# Patient Record
Sex: FEMALE | Race: WHITE | NOT HISPANIC OR LATINO | Employment: OTHER | URBAN - METROPOLITAN AREA
[De-identification: names, ages, dates, MRNs, and addresses within clinical notes are randomized per-mention and may not be internally consistent; named-entity substitution may affect disease eponyms.]

---

## 2017-01-04 ENCOUNTER — COMMUNICATION - HEALTHEAST (OUTPATIENT)
Dept: VASCULAR SURGERY | Facility: CLINIC | Age: 57
End: 2017-01-04

## 2017-01-04 ENCOUNTER — OFFICE VISIT - HEALTHEAST (OUTPATIENT)
Dept: PHYSICAL THERAPY | Facility: REHABILITATION | Age: 57
End: 2017-01-04

## 2017-01-04 ENCOUNTER — RECORDS - HEALTHEAST (OUTPATIENT)
Dept: ADMINISTRATIVE | Facility: OTHER | Age: 57
End: 2017-01-04

## 2017-01-04 ENCOUNTER — OFFICE VISIT - HEALTHEAST (OUTPATIENT)
Dept: VASCULAR SURGERY | Facility: CLINIC | Age: 57
End: 2017-01-04

## 2017-01-04 DIAGNOSIS — I89.0 ACQUIRED LYMPHEDEMA OF LEG: ICD-10-CM

## 2017-01-04 DIAGNOSIS — E66.01 MORBID OBESITY WITH BMI OF 45.0-49.9, ADULT (H): ICD-10-CM

## 2017-01-04 DIAGNOSIS — I87.332 VENOUS HYPERTENSION, CHRONIC, WITH ULCER AND INFLAMMATION, LEFT (H): ICD-10-CM

## 2017-01-04 DIAGNOSIS — I87.321 VENOUS HYPERTENSION, CHRONIC, WITH INFLAMMATION, RIGHT: ICD-10-CM

## 2017-01-06 ENCOUNTER — OFFICE VISIT - HEALTHEAST (OUTPATIENT)
Dept: PHYSICAL THERAPY | Facility: REHABILITATION | Age: 57
End: 2017-01-06

## 2017-01-06 DIAGNOSIS — E66.01 MORBID OBESITY WITH BMI OF 45.0-49.9, ADULT (H): ICD-10-CM

## 2017-01-06 DIAGNOSIS — I87.321 VENOUS HYPERTENSION, CHRONIC, WITH INFLAMMATION, RIGHT: ICD-10-CM

## 2017-01-06 DIAGNOSIS — I89.0 ACQUIRED LYMPHEDEMA OF LEG: ICD-10-CM

## 2017-01-09 ENCOUNTER — OFFICE VISIT - HEALTHEAST (OUTPATIENT)
Dept: PHYSICAL THERAPY | Facility: REHABILITATION | Age: 57
End: 2017-01-09

## 2017-01-09 DIAGNOSIS — I89.0 ACQUIRED LYMPHEDEMA OF LEG: ICD-10-CM

## 2017-01-09 DIAGNOSIS — E66.01 MORBID OBESITY WITH BMI OF 45.0-49.9, ADULT (H): ICD-10-CM

## 2017-01-09 DIAGNOSIS — I87.321 VENOUS HYPERTENSION, CHRONIC, WITH INFLAMMATION, RIGHT: ICD-10-CM

## 2017-01-11 ENCOUNTER — OFFICE VISIT - HEALTHEAST (OUTPATIENT)
Dept: PHYSICAL THERAPY | Facility: REHABILITATION | Age: 57
End: 2017-01-11

## 2017-01-11 DIAGNOSIS — E66.01 MORBID OBESITY WITH BMI OF 45.0-49.9, ADULT (H): ICD-10-CM

## 2017-01-11 DIAGNOSIS — I87.321 VENOUS HYPERTENSION, CHRONIC, WITH INFLAMMATION, RIGHT: ICD-10-CM

## 2017-01-11 DIAGNOSIS — I89.0 ACQUIRED LYMPHEDEMA OF LEG: ICD-10-CM

## 2017-01-13 ENCOUNTER — OFFICE VISIT - HEALTHEAST (OUTPATIENT)
Dept: SURGERY | Facility: CLINIC | Age: 57
End: 2017-01-13

## 2017-01-13 ENCOUNTER — OFFICE VISIT - HEALTHEAST (OUTPATIENT)
Dept: PHYSICAL THERAPY | Facility: REHABILITATION | Age: 57
End: 2017-01-13

## 2017-01-13 DIAGNOSIS — I89.0 LYMPHEDEMA: ICD-10-CM

## 2017-01-13 DIAGNOSIS — E03.9 HYPOTHYROID: ICD-10-CM

## 2017-01-13 DIAGNOSIS — E66.01 MORBID OBESITY WITH BMI OF 45.0-49.9, ADULT (H): ICD-10-CM

## 2017-01-13 DIAGNOSIS — E66.01 OBESITY, CLASS III, BMI 40-49.9 (MORBID OBESITY) (H): ICD-10-CM

## 2017-01-13 DIAGNOSIS — I87.321 VENOUS HYPERTENSION, CHRONIC, WITH INFLAMMATION, RIGHT: ICD-10-CM

## 2017-01-13 DIAGNOSIS — I89.0 ACQUIRED LYMPHEDEMA OF LEG: ICD-10-CM

## 2017-01-13 ASSESSMENT — MIFFLIN-ST. JEOR: SCORE: 1653.27

## 2017-01-16 ENCOUNTER — OFFICE VISIT - HEALTHEAST (OUTPATIENT)
Dept: PHYSICAL THERAPY | Facility: REHABILITATION | Age: 57
End: 2017-01-16

## 2017-01-16 ENCOUNTER — OFFICE VISIT - HEALTHEAST (OUTPATIENT)
Dept: VASCULAR SURGERY | Facility: CLINIC | Age: 57
End: 2017-01-16

## 2017-01-16 ENCOUNTER — RECORDS - HEALTHEAST (OUTPATIENT)
Dept: ADMINISTRATIVE | Facility: OTHER | Age: 57
End: 2017-01-16

## 2017-01-16 DIAGNOSIS — I89.0 ACQUIRED LYMPHEDEMA OF LEG: ICD-10-CM

## 2017-01-16 DIAGNOSIS — E66.01 MORBID OBESITY WITH BMI OF 45.0-49.9, ADULT (H): ICD-10-CM

## 2017-01-16 DIAGNOSIS — I87.332 VENOUS HYPERTENSION, CHRONIC, WITH ULCER AND INFLAMMATION, LEFT (H): ICD-10-CM

## 2017-01-16 DIAGNOSIS — I87.321 VENOUS HYPERTENSION, CHRONIC, WITH INFLAMMATION, RIGHT: ICD-10-CM

## 2017-01-18 ENCOUNTER — OFFICE VISIT - HEALTHEAST (OUTPATIENT)
Dept: PHYSICAL THERAPY | Facility: REHABILITATION | Age: 57
End: 2017-01-18

## 2017-01-18 ENCOUNTER — AMBULATORY - HEALTHEAST (OUTPATIENT)
Dept: VASCULAR SURGERY | Facility: CLINIC | Age: 57
End: 2017-01-18

## 2017-01-18 DIAGNOSIS — E66.01 MORBID OBESITY WITH BMI OF 45.0-49.9, ADULT (H): ICD-10-CM

## 2017-01-18 DIAGNOSIS — I89.0 ACQUIRED LYMPHEDEMA OF LEG: ICD-10-CM

## 2017-01-18 DIAGNOSIS — I87.321 VENOUS HYPERTENSION, CHRONIC, WITH INFLAMMATION, RIGHT: ICD-10-CM

## 2017-01-20 ENCOUNTER — OFFICE VISIT - HEALTHEAST (OUTPATIENT)
Dept: PHYSICAL THERAPY | Facility: REHABILITATION | Age: 57
End: 2017-01-20

## 2017-01-20 DIAGNOSIS — E66.01 MORBID OBESITY WITH BMI OF 45.0-49.9, ADULT (H): ICD-10-CM

## 2017-01-20 DIAGNOSIS — I87.321 VENOUS HYPERTENSION, CHRONIC, WITH INFLAMMATION, RIGHT: ICD-10-CM

## 2017-01-20 DIAGNOSIS — I89.0 ACQUIRED LYMPHEDEMA OF LEG: ICD-10-CM

## 2017-01-23 ENCOUNTER — COMMUNICATION - HEALTHEAST (OUTPATIENT)
Dept: SURGERY | Facility: CLINIC | Age: 57
End: 2017-01-23

## 2017-01-25 ENCOUNTER — OFFICE VISIT - HEALTHEAST (OUTPATIENT)
Dept: PHYSICAL THERAPY | Facility: REHABILITATION | Age: 57
End: 2017-01-25

## 2017-01-25 DIAGNOSIS — E66.01 MORBID OBESITY WITH BMI OF 45.0-49.9, ADULT (H): ICD-10-CM

## 2017-01-25 DIAGNOSIS — I87.321 VENOUS HYPERTENSION, CHRONIC, WITH INFLAMMATION, RIGHT: ICD-10-CM

## 2017-01-25 DIAGNOSIS — I89.0 ACQUIRED LYMPHEDEMA OF LEG: ICD-10-CM

## 2017-02-15 ENCOUNTER — OFFICE VISIT - HEALTHEAST (OUTPATIENT)
Dept: VASCULAR SURGERY | Facility: CLINIC | Age: 57
End: 2017-02-15

## 2017-02-15 ENCOUNTER — AMBULATORY - HEALTHEAST (OUTPATIENT)
Dept: LAB | Facility: HOSPITAL | Age: 57
End: 2017-02-15

## 2017-02-15 DIAGNOSIS — I87.321 VENOUS HYPERTENSION, CHRONIC, WITH INFLAMMATION, RIGHT: ICD-10-CM

## 2017-02-15 DIAGNOSIS — E66.01 MORBID OBESITY WITH BMI OF 45.0-49.9, ADULT (H): ICD-10-CM

## 2017-02-15 DIAGNOSIS — E55.9 VITAMIN D DEFICIENCY: ICD-10-CM

## 2017-02-15 DIAGNOSIS — E66.01 OBESITY, CLASS III, BMI 40-49.9 (MORBID OBESITY) (H): ICD-10-CM

## 2017-02-15 DIAGNOSIS — I87.332 VENOUS HYPERTENSION, CHRONIC, WITH ULCER AND INFLAMMATION, LEFT (H): ICD-10-CM

## 2017-02-15 DIAGNOSIS — I89.0 ACQUIRED LYMPHEDEMA OF LEG: ICD-10-CM

## 2017-02-16 LAB — HBA1C MFR BLD: 4.2 % (ref 4.2–6.1)

## 2017-02-17 ENCOUNTER — COMMUNICATION - HEALTHEAST (OUTPATIENT)
Dept: VASCULAR SURGERY | Facility: CLINIC | Age: 57
End: 2017-02-17

## 2017-02-17 ENCOUNTER — COMMUNICATION - HEALTHEAST (OUTPATIENT)
Dept: SURGERY | Facility: CLINIC | Age: 57
End: 2017-02-17

## 2017-02-17 ENCOUNTER — OFFICE VISIT - HEALTHEAST (OUTPATIENT)
Dept: SURGERY | Facility: CLINIC | Age: 57
End: 2017-02-17

## 2017-02-17 DIAGNOSIS — E66.01 MORBID OBESITY WITH BMI OF 40.0-44.9, ADULT (H): ICD-10-CM

## 2017-02-17 DIAGNOSIS — E55.9 VITAMIN D DEFICIENCY: ICD-10-CM

## 2017-02-17 ASSESSMENT — MIFFLIN-ST. JEOR: SCORE: 1585.23

## 2017-02-18 ENCOUNTER — COMMUNICATION - HEALTHEAST (OUTPATIENT)
Dept: SURGERY | Facility: CLINIC | Age: 57
End: 2017-02-18

## 2017-02-27 ENCOUNTER — OFFICE VISIT - HEALTHEAST (OUTPATIENT)
Dept: SURGERY | Facility: CLINIC | Age: 57
End: 2017-02-27

## 2017-02-27 DIAGNOSIS — E66.01 OBESITY, MORBID, BMI 40.0-49.9 (H): ICD-10-CM

## 2017-02-27 DIAGNOSIS — Z71.3 DIETARY COUNSELING: ICD-10-CM

## 2017-02-27 ASSESSMENT — MIFFLIN-ST. JEOR: SCORE: 1543.5

## 2017-04-24 ENCOUNTER — OFFICE VISIT - HEALTHEAST (OUTPATIENT)
Dept: VASCULAR SURGERY | Facility: CLINIC | Age: 57
End: 2017-04-24

## 2017-04-24 DIAGNOSIS — I87.321 VENOUS HYPERTENSION, CHRONIC, WITH INFLAMMATION, RIGHT: ICD-10-CM

## 2017-04-24 DIAGNOSIS — I89.0 ACQUIRED LYMPHEDEMA OF LEG: ICD-10-CM

## 2017-04-24 DIAGNOSIS — G62.9 PERIPHERAL NEUROPATHY: ICD-10-CM

## 2017-04-24 DIAGNOSIS — E66.01 MORBID OBESITY WITH BMI OF 40.0-44.9, ADULT (H): ICD-10-CM

## 2017-04-24 DIAGNOSIS — I87.332 VENOUS HYPERTENSION, CHRONIC, WITH ULCER AND INFLAMMATION, LEFT (H): ICD-10-CM

## 2017-06-07 ENCOUNTER — OFFICE VISIT - HEALTHEAST (OUTPATIENT)
Dept: SURGERY | Facility: CLINIC | Age: 57
End: 2017-06-07

## 2017-06-07 DIAGNOSIS — D69.6 THROMBOCYTOPENIA (H): ICD-10-CM

## 2017-06-07 DIAGNOSIS — E60 LOW ZINC LEVEL: ICD-10-CM

## 2017-06-07 DIAGNOSIS — R79.89 ABNORMAL LFTS: ICD-10-CM

## 2017-06-07 DIAGNOSIS — E66.01 OBESITY, CLASS III, BMI 40-49.9 (MORBID OBESITY) (H): ICD-10-CM

## 2017-06-07 ASSESSMENT — MIFFLIN-ST. JEOR: SCORE: 1594.19

## 2017-06-23 ENCOUNTER — AMBULATORY - HEALTHEAST (OUTPATIENT)
Dept: LAB | Facility: HOSPITAL | Age: 57
End: 2017-06-23

## 2017-06-23 DIAGNOSIS — E66.01 OBESITY, CLASS III, BMI 40-49.9 (MORBID OBESITY) (H): ICD-10-CM

## 2017-06-23 DIAGNOSIS — E60 LOW ZINC LEVEL: ICD-10-CM

## 2017-06-23 DIAGNOSIS — D69.6 THROMBOCYTOPENIA (H): ICD-10-CM

## 2017-06-23 DIAGNOSIS — R79.89 ABNORMAL LFTS: ICD-10-CM

## 2017-06-26 ENCOUNTER — AMBULATORY - HEALTHEAST (OUTPATIENT)
Dept: SURGERY | Facility: CLINIC | Age: 57
End: 2017-06-26

## 2017-06-26 DIAGNOSIS — E60 LOW ZINC LEVEL: ICD-10-CM

## 2017-06-29 ENCOUNTER — COMMUNICATION - HEALTHEAST (OUTPATIENT)
Dept: SURGERY | Facility: CLINIC | Age: 57
End: 2017-06-29

## 2017-08-02 ENCOUNTER — COMMUNICATION - HEALTHEAST (OUTPATIENT)
Dept: SURGERY | Facility: CLINIC | Age: 57
End: 2017-08-02

## 2017-08-02 ENCOUNTER — AMBULATORY - HEALTHEAST (OUTPATIENT)
Dept: SURGERY | Facility: CLINIC | Age: 57
End: 2017-08-02

## 2017-08-02 DIAGNOSIS — E60 LOW ZINC LEVEL: ICD-10-CM

## 2017-08-02 DIAGNOSIS — R79.89 LOW VITAMIN D LEVEL: ICD-10-CM

## 2017-08-04 ENCOUNTER — OFFICE VISIT - HEALTHEAST (OUTPATIENT)
Dept: SURGERY | Facility: CLINIC | Age: 57
End: 2017-08-04

## 2017-08-04 DIAGNOSIS — Z71.3 DIETARY COUNSELING: ICD-10-CM

## 2017-08-04 DIAGNOSIS — E66.9 OBESITY (BMI 30-39.9): ICD-10-CM

## 2017-08-04 ASSESSMENT — MIFFLIN-ST. JEOR: SCORE: 1491.68

## 2017-08-09 ENCOUNTER — RECORDS - HEALTHEAST (OUTPATIENT)
Dept: LAB | Facility: CLINIC | Age: 57
End: 2017-08-09

## 2017-08-09 LAB — AFP SERPL-MCNC: 8.8 UG/ML

## 2017-08-10 ENCOUNTER — TRANSFERRED RECORDS (OUTPATIENT)
Dept: HEALTH INFORMATION MANAGEMENT | Facility: CLINIC | Age: 57
End: 2017-08-10

## 2017-08-10 LAB
HBV SURFACE AG SERPL QL IA: NEGATIVE
HCV AB SERPL QL IA: NEGATIVE
HEPATITIS B SURFACE ANTIBODY LHE- HISTORICAL: NEGATIVE

## 2017-08-23 ENCOUNTER — OFFICE VISIT - HEALTHEAST (OUTPATIENT)
Dept: SURGERY | Facility: CLINIC | Age: 57
End: 2017-08-23

## 2017-08-23 DIAGNOSIS — R79.89 HIGH SERUM FERRITIN: ICD-10-CM

## 2017-08-23 ASSESSMENT — MIFFLIN-ST. JEOR: SCORE: 1493.95

## 2017-08-25 ENCOUNTER — TRANSFERRED RECORDS (OUTPATIENT)
Dept: HEALTH INFORMATION MANAGEMENT | Facility: CLINIC | Age: 57
End: 2017-08-25

## 2017-09-19 ENCOUNTER — INFUSION - HEALTHEAST (OUTPATIENT)
Dept: INFUSION THERAPY | Facility: HOSPITAL | Age: 57
End: 2017-09-19

## 2017-09-19 DIAGNOSIS — E83.19 IRON OVERLOAD: ICD-10-CM

## 2017-09-19 DIAGNOSIS — E83.119 HEMOCHROMATOSIS: ICD-10-CM

## 2017-09-19 ASSESSMENT — MIFFLIN-ST. JEOR: SCORE: 1475.8

## 2017-09-26 ENCOUNTER — INFUSION - HEALTHEAST (OUTPATIENT)
Dept: INFUSION THERAPY | Facility: HOSPITAL | Age: 57
End: 2017-09-26

## 2017-09-26 DIAGNOSIS — E83.119 HEMOCHROMATOSIS: ICD-10-CM

## 2017-09-26 DIAGNOSIS — E83.19 IRON OVERLOAD: ICD-10-CM

## 2017-09-28 ENCOUNTER — RECORDS - HEALTHEAST (OUTPATIENT)
Dept: ADMINISTRATIVE | Facility: OTHER | Age: 57
End: 2017-09-28

## 2017-10-03 ENCOUNTER — AMBULATORY - HEALTHEAST (OUTPATIENT)
Dept: INFUSION THERAPY | Facility: HOSPITAL | Age: 57
End: 2017-10-03

## 2017-10-04 ENCOUNTER — HOSPITAL ENCOUNTER (OUTPATIENT)
Dept: CARDIOLOGY | Facility: HOSPITAL | Age: 57
Discharge: HOME | End: 2017-10-04
Attending: INTERNAL MEDICINE

## 2017-10-04 DIAGNOSIS — R79.89 ELEVATED FERRITIN: ICD-10-CM

## 2017-10-04 DIAGNOSIS — R01.1 HEART MURMUR: ICD-10-CM

## 2017-10-04 DIAGNOSIS — R17 ELEVATED BILIRUBIN: ICD-10-CM

## 2017-10-04 DIAGNOSIS — R60.9 EDEMA, UNSPECIFIED TYPE: ICD-10-CM

## 2017-10-04 DIAGNOSIS — R74.8 ABNORMAL LIVER ENZYMES: ICD-10-CM

## 2017-10-04 LAB
AORTIC ROOT: 2.9 CM
AORTIC VALVE MEAN VELOCITY: 120 CM/S
AV DIMENSIONLESS INDEX VTI: 0.7
AV MEAN GRADIENT: 7 MMHG
AV PEAK GRADIENT: 14.3 MMHG
AV VALVE AREA: 2.2 CM2
AV VELOCITY RATIO: 0.8
BSA FOR ECHO PROCEDURE: 2.05 M2
CV BLOOD PRESSURE: NORMAL MMHG
CV ECHO HEIGHT: 62 IN
CV ECHO WEIGHT: 212 LBS
DOP CALC AO PEAK VEL: 189 CM/S
DOP CALC AO VTI: 34 CM
DOP CALC LVOT AREA: 3.14 CM2
DOP CALC LVOT DIAMETER: 2 CM
DOP CALC LVOT PEAK VEL: 144 CM/S
DOP CALC LVOT STROKE VOLUME: 75.4 CM3
DOP CALCLVOT PEAK VEL VTI: 24 CM
EJECTION FRACTION: 61 % (ref 55–75)
FRACTIONAL SHORTENING: 29.3 % (ref 28–44)
INTERVENTRICULAR SEPTUM IN END DIASTOLE: 0.94 CM (ref 0.6–0.9)
IVS/PW RATIO: 0.9
LA AREA 1: 16.3 CM2
LA AREA 2: 19 CM2
LEFT ATRIUM LENGTH: 5.2 CM
LEFT ATRIUM SIZE: 4 CM
LEFT ATRIUM TO AORTIC ROOT RATIO: 1.38 NO UNITS
LEFT ATRIUM VOLUME INDEX: 24.7 ML/M2
LEFT ATRIUM VOLUME: 50.6 CM3
LEFT VENTRICLE DIASTOLIC VOLUME INDEX: 45.6 CM3/M2 (ref 34–74)
LEFT VENTRICLE DIASTOLIC VOLUME: 93.4 CM3 (ref 46–106)
LEFT VENTRICLE MASS INDEX: 83.4 G/M2
LEFT VENTRICLE SYSTOLIC VOLUME INDEX: 17.6 CM3/M2 (ref 11–31)
LEFT VENTRICLE SYSTOLIC VOLUME: 36 CM3 (ref 14–42)
LEFT VENTRICULAR INTERNAL DIMENSION IN DIASTOLE: 4.92 CM (ref 3.8–5.2)
LEFT VENTRICULAR INTERNAL DIMENSION IN SYSTOLE: 3.48 CM (ref 2.2–3.5)
LEFT VENTRICULAR MASS: 171 G
LEFT VENTRICULAR OUTFLOW TRACT MEAN GRADIENT: 4 MMHG
LEFT VENTRICULAR OUTFLOW TRACT MEAN VELOCITY: 83.6 CM/S
LEFT VENTRICULAR OUTFLOW TRACT PEAK GRADIENT: 8 MMHG
LEFT VENTRICULAR POSTERIOR WALL IN END DIASTOLE: 1.01 CM (ref 0.6–0.9)
LV STROKE VOLUME INDEX: 36.8 ML/M2
MITRAL VALVE E/A RATIO: 1
MV AVERAGE E/E' RATIO: 8.1 CM/S
MV DECELERATION TIME: 225 MS
MV E'TISSUE VEL-LAT: 11.5 CM/S
MV E'TISSUE VEL-MED: 12.1 CM/S
MV LATERAL E/E' RATIO: 8.3
MV MEDIAL E/E' RATIO: 7.9
MV PEAK A VELOCITY: 91.2 CM/S
MV PEAK E VELOCITY: 95.1 CM/S
NUC REST DIASTOLIC VOLUME INDEX: 3392 LBS
NUC REST SYSTOLIC VOLUME INDEX: 62 IN
TRICUSPID REGURGITATION PEAK PRESSURE GRADIENT: 13 MMHG
TRICUSPID VALVE ANULAR PLANE SYSTOLIC EXCURSION: 3 CM
TRICUSPID VALVE PEAK REGURGITANT VELOCITY: 180 CM/S

## 2017-10-04 ASSESSMENT — MIFFLIN-ST. JEOR: SCORE: 1484.88

## 2017-10-23 ENCOUNTER — COMMUNICATION - HEALTHEAST (OUTPATIENT)
Dept: SURGERY | Facility: CLINIC | Age: 57
End: 2017-10-23

## 2017-10-23 DIAGNOSIS — E66.01 MORBID OBESITY WITH BMI OF 40.0-44.9, ADULT (H): ICD-10-CM

## 2017-12-06 ENCOUNTER — RECORDS - HEALTHEAST (OUTPATIENT)
Dept: ADMINISTRATIVE | Facility: OTHER | Age: 57
End: 2017-12-06

## 2017-12-06 ENCOUNTER — TRANSFERRED RECORDS (OUTPATIENT)
Dept: HEALTH INFORMATION MANAGEMENT | Facility: CLINIC | Age: 57
End: 2017-12-06

## 2017-12-12 ENCOUNTER — HOSPITAL ENCOUNTER (OUTPATIENT)
Dept: MAMMOGRAPHY | Facility: HOSPITAL | Age: 57
Discharge: HOME | End: 2017-12-12
Attending: FAMILY MEDICINE

## 2017-12-12 ENCOUNTER — RECORDS - HEALTHEAST (OUTPATIENT)
Dept: ADMINISTRATIVE | Facility: OTHER | Age: 57
End: 2017-12-12

## 2017-12-12 DIAGNOSIS — N63.10 LUMP OF RIGHT BREAST: ICD-10-CM

## 2017-12-13 ENCOUNTER — RECORDS - HEALTHEAST (OUTPATIENT)
Dept: ADMINISTRATIVE | Facility: OTHER | Age: 57
End: 2017-12-13

## 2017-12-14 ENCOUNTER — OFFICE VISIT - HEALTHEAST (OUTPATIENT)
Dept: SURGERY | Facility: CLINIC | Age: 57
End: 2017-12-14

## 2017-12-14 DIAGNOSIS — K74.60 CIRRHOSIS OF LIVER WITHOUT ASCITES, UNSPECIFIED HEPATIC CIRRHOSIS TYPE (H): ICD-10-CM

## 2017-12-14 DIAGNOSIS — K81.1 CHRONIC CHOLECYSTITIS: ICD-10-CM

## 2017-12-14 ASSESSMENT — MIFFLIN-ST. JEOR: SCORE: 1521.16

## 2018-01-10 ENCOUNTER — TRANSFERRED RECORDS (OUTPATIENT)
Dept: HEALTH INFORMATION MANAGEMENT | Facility: CLINIC | Age: 58
End: 2018-01-10

## 2018-01-31 ENCOUNTER — TRANSFERRED RECORDS (OUTPATIENT)
Dept: HEALTH INFORMATION MANAGEMENT | Facility: CLINIC | Age: 58
End: 2018-01-31

## 2018-02-01 ENCOUNTER — RECORDS - HEALTHEAST (OUTPATIENT)
Dept: ADMINISTRATIVE | Facility: OTHER | Age: 58
End: 2018-02-01

## 2018-02-07 ENCOUNTER — HOSPITAL ENCOUNTER (OUTPATIENT)
Dept: NUCLEAR MEDICINE | Facility: HOSPITAL | Age: 58
Discharge: HOME | End: 2018-02-07
Attending: INTERNAL MEDICINE

## 2018-02-07 ENCOUNTER — TRANSFERRED RECORDS (OUTPATIENT)
Dept: HEALTH INFORMATION MANAGEMENT | Facility: CLINIC | Age: 58
End: 2018-02-07

## 2018-02-07 DIAGNOSIS — K31.89 RETAINED FOOD IN STOMACH: ICD-10-CM

## 2018-02-07 DIAGNOSIS — K74.60 CIRRHOSIS OF LIVER (H): ICD-10-CM

## 2018-02-07 DIAGNOSIS — R11.0 NAUSEA: ICD-10-CM

## 2018-03-12 ENCOUNTER — COMMUNICATION - HEALTHEAST (OUTPATIENT)
Dept: VASCULAR SURGERY | Facility: CLINIC | Age: 58
End: 2018-03-12

## 2018-03-12 ENCOUNTER — TELEPHONE (OUTPATIENT)
Dept: TRANSPLANT | Facility: CLINIC | Age: 58
End: 2018-03-12

## 2018-03-12 NOTE — LETTER
March 12, 2018      Nicci Pemberton  [address] [facility] [address]    Dear Nicci,    Thank you for your interest in the Transplant Center at [facility], [facility]. We look forward to being a part of your care team and assisting you through the transplant process.    As we discussed, your transplant coordinator is Monae Tapia (Liver).  You may call your coordinator at any time with questions or concerns, call [phone] option 4.    Please complete the following.    1. Fill out and return the enclosed forms    Authorization for Electronic Communication    Authorization to Discuss Protected Health Information    [employer] Release of Information    2. Sign up for:    [employer], access to your electronic medical record (see enclosed pamphlet)    [employer], a transplant education website (see enclosed booklet)    You can use these tools to learn more about your transplant, communicate with your care team, and track your medical details    Sincerely,    Solid Organ Transplant  [facility], [facility]    cc: Care Team

## 2018-03-12 NOTE — TELEPHONE ENCOUNTER
Intake Progress Note    Organ:  Liver    Initial Call Made by: referral from [facility]    Referring Physician:  Dr Raymond Leija    PCP- Dr Pedro Carrillo- [facility]    Hematologist- [facility]- Nina    Assigned Coordinator: Monae Tapia    Reported Diagnosis: Cirrhosis likely fatty liver    On Dialysis:   No    Reported Medical History:  Has had elevated liver labs for years.  SInce has been loosing weight and working with [employer]'s her labs have been rising.  Has lost 50 #  Has lymphodema- on diuretics.      Records:  I will request.     Clinic RN Appt (Only Adult Kidney, Liver and Pancreas Referrals): Y or N    Preferred Evaluation Start Date:  ASAP     Online Forms    Best time patient can be reached:    Type of packet sent:  Liver packet    Misc. Notes: May speak with emergency contacts listed in EPIC- - Keny    Verbal Consent: Y     Email Consent: Y or N    If no PCP or referring information the time of call informed pt to call back with information: Y or N    Informed patient to call if doesn't receive packet and or phone call from coordinator within given time Y    [facility][birth year]) policy anne- ID- [id number]  Group- [id number]

## 2018-03-13 ENCOUNTER — TRANSFERRED RECORDS (OUTPATIENT)
Dept: HEALTH INFORMATION MANAGEMENT | Facility: CLINIC | Age: 58
End: 2018-03-13

## 2018-03-14 ENCOUNTER — APPOINTMENT (OUTPATIENT)
Dept: TRANSPLANT | Facility: CLINIC | Age: 58
End: 2018-03-14
Attending: INTERNAL MEDICINE
Payer: COMMERCIAL

## 2018-03-20 NOTE — TELEPHONE ENCOUNTER
Return VM received from pt reporting she is on vacation and can be reached on 3/26/18. Will attempt to reach pt on 3/26/18.

## 2018-03-21 ENCOUNTER — MEDICAL CORRESPONDENCE (OUTPATIENT)
Dept: TRANSPLANT | Facility: CLINIC | Age: 58
End: 2018-03-21

## 2018-03-22 ENCOUNTER — MEDICAL CORRESPONDENCE (OUTPATIENT)
Dept: TRANSPLANT | Facility: CLINIC | Age: 58
End: 2018-03-22

## 2018-03-28 DIAGNOSIS — K74.60 CIRRHOSIS (H): Primary | ICD-10-CM

## 2018-03-28 NOTE — TELEPHONE ENCOUNTER
"56yo referred by Dr. Leija at [facility]. Pt with hx cirrhosis from unknown cause, possibly ANGULO. Pt reports that she has had elevated LFTs for \"many years.\" Per referring notes alpha 1 phenotype MZ, antimitochondrial lanre negative and genetic hemochromatosis normal (see scanned records). Pt also has a hx anemia and was evaluated by heme/onc 12/2017 (see scanned records). Pt acknowledges that she is overweight, but has been working on weight loss and has had success (50lb weight loss over the past year). Pt has not drank alcohol significantly for over 25 years, last drink January 2017 when she had a few sips on New Years. Pt denies drug us and has not smoked since 2011 (prior <1ppd x20 years) Pt also has a hx lymphedema and LE cellulitis. Pt works full time in a hardware store. She lives with her  who is supportive and involved.     MELD: 15 (per referring note)  Imaging: abd US 8/10/17 at [facility] (see scanned records)  Ascites: none  HE: none  GIB: no hx  EGD: 1/13/18 at [facility] (see scanned records)  Colonoscopy: never    Plan:   Consult with Dr. Montes on 4/24/18, consult due to low MELD and compensated liver disease.     Pt verbalized understanding and comfort with above plan. Pt confirmed he  will attend appts.     Message left for referring provider updating him of above plan.   "

## 2018-04-11 ENCOUNTER — TELEPHONE (OUTPATIENT)
Dept: TRANSPLANT | Facility: CLINIC | Age: 58
End: 2018-04-11

## 2018-04-11 NOTE — TELEPHONE ENCOUNTER
April 11, 2018: I called Nicci  (and left a message) to introduce myself and discuss timing of the liver consult that Monae, her coordinator, placed orders for.    We would like for her to attend appointments on Tuesday, April 24.    Alma Rosa Recinos  Pre-Liver Transplant   [phone]    Message  Received: 2 weeks ago       Monae Tapia, RN  Alma Rosa Recinos       Please schedule pt for consult with Dr. Montes on 4/24/18. Ideally should could be scheduled in an early slot to allow adding surgery if appropriate. Let me know if you have any questions.            Orders Only for Transplant Evaluation  3/28/2018       Monae Tapia, RN - [facility] Solid Organ Transplant Encounter Summary       Diagnosis       Cirrhosis (h) (Primary)              Orders Signed This Encounter (10)      CBC with platelets        INR        AFP tumor marker        Hepatic panel        Basic metabolic panel        ABO/Rh type and screen        Pulse oximetry nursing         REFERRAL        NUTRITION REFERRAL        GASTROENTEROLOGY ADULT REF CONSULT ONLY

## 2018-04-11 NOTE — LETTER
April 12, 2018    LIVER CONSULT SCHEDULE    Patient:   Nicci Pemberton  MR#:    [id number]  Coordinator:  Monae Tapia RN  [phone]  :    Alma Rosa BUCKNER   [phone]  Location[facility]    [facility]  Date:    April 24, 2018    This is your consultation schedule, please follow dates and times. You will receive reminder phone calls for other tests, but please follow this schedule only! If you have any questions about dates and times, please call us on the number listed above. Thank you, Transplant Services       Day/Date:  Tuesday, April 24, 2018  Time Location Activity   6:45 am Imaging and Lab testing  (1st floor [facility],  [address] [place] [address]; [facility] [address]) Blood tests   7:30 am Transplant Services  (3rd floor [facility]) Review evaluation process & daily schedule   8:00 am Medicine Specialties  (3rd floor [facility]) Appointment with Dr. Montes,  Hepatologist   9:30 am Transplant Services  ([facility] floor [facility]) Appointment with Tamika Zhang  Registered Dietitian   10:00 am Transplant Services  (3rd floor [facility]) Appointment with Tracy Almeida     11:15 am Transplant Services  ([facility] floor [facility]) Check out with the Nursing Staff       * IF ONLINE CHECK IN IS NOT DONE, YOU WILL NEED TO CHECK IN AT LEAST 15 MINUTES EARLIER THAN THE FIRST SCHEDULED APPOINTMENT *

## 2018-04-12 NOTE — TELEPHONE ENCOUNTER
April 12, 2018: I spoke with Nicci who is more than willing to be seen on April 24. I assured her I would send the schedule today via [employer] and advised her that, if she doesn't receive it by the end of the next week, to give us a call and I can [employer] next-day air it.    Alma Rosa Pennington  Pre-Liver Transplant   [phone]

## 2018-04-13 ENCOUNTER — TRANSFERRED RECORDS (OUTPATIENT)
Dept: HEALTH INFORMATION MANAGEMENT | Facility: CLINIC | Age: 58
End: 2018-04-13

## 2018-04-24 ENCOUNTER — OFFICE VISIT (OUTPATIENT)
Dept: TRANSPLANT | Facility: CLINIC | Age: 58
End: 2018-04-24
Attending: INTERNAL MEDICINE
Payer: COMMERCIAL

## 2018-04-24 ENCOUNTER — OFFICE VISIT (OUTPATIENT)
Dept: GASTROENTEROLOGY | Facility: CLINIC | Age: 58
End: 2018-04-24
Attending: INTERNAL MEDICINE
Payer: COMMERCIAL

## 2018-04-24 VITALS
WEIGHT: 220.5 LBS | TEMPERATURE: 97.7 F | BODY MASS INDEX: 40.58 KG/M2 | HEIGHT: 62 IN | HEART RATE: 84 BPM | OXYGEN SATURATION: 100 % | RESPIRATION RATE: 16 BRPM | DIASTOLIC BLOOD PRESSURE: 76 MMHG | SYSTOLIC BLOOD PRESSURE: 117 MMHG

## 2018-04-24 DIAGNOSIS — K74.60 CIRRHOSIS (H): Primary | ICD-10-CM

## 2018-04-24 DIAGNOSIS — K72.90 DECOMPENSATED HEPATIC CIRRHOSIS (H): Primary | ICD-10-CM

## 2018-04-24 DIAGNOSIS — Z76.82 AWAITING ORGAN TRANSPLANT STATUS: Primary | ICD-10-CM

## 2018-04-24 DIAGNOSIS — K74.60 DECOMPENSATED HEPATIC CIRRHOSIS (H): Primary | ICD-10-CM

## 2018-04-24 DIAGNOSIS — K74.69 OTHER CIRRHOSIS OF LIVER (H): Primary | ICD-10-CM

## 2018-04-24 DIAGNOSIS — K74.60 CIRRHOSIS (H): ICD-10-CM

## 2018-04-24 LAB
ABO + RH BLD: NORMAL
ABO + RH BLD: NORMAL
AFP SERPL-MCNC: 5.9 UG/L (ref 0–8)
ALBUMIN SERPL-MCNC: 2.6 G/DL (ref 3.4–5)
ALP SERPL-CCNC: 150 U/L (ref 40–150)
ALT SERPL W P-5'-P-CCNC: 55 U/L (ref 0–50)
ANION GAP SERPL CALCULATED.3IONS-SCNC: 5 MMOL/L (ref 3–14)
AST SERPL W P-5'-P-CCNC: 81 U/L (ref 0–45)
BILIRUB DIRECT SERPL-MCNC: 1.3 MG/DL (ref 0–0.2)
BILIRUB SERPL-MCNC: 3.3 MG/DL (ref 0.2–1.3)
BLD GP AB SCN SERPL QL: NORMAL
BLOOD BANK CMNT PATIENT-IMP: NORMAL
BUN SERPL-MCNC: 11 MG/DL (ref 7–30)
CALCIUM SERPL-MCNC: 8.9 MG/DL (ref 8.5–10.1)
CHLORIDE SERPL-SCNC: 102 MMOL/L (ref 94–109)
CO2 SERPL-SCNC: 30 MMOL/L (ref 20–32)
CREAT SERPL-MCNC: 0.67 MG/DL (ref 0.52–1.04)
ERYTHROCYTE [DISTWIDTH] IN BLOOD BY AUTOMATED COUNT: 13.8 % (ref 10–15)
GFR SERPL CREATININE-BSD FRML MDRD: >90 ML/MIN/1.7M2
GLUCOSE SERPL-MCNC: 80 MG/DL (ref 70–99)
HCT VFR BLD AUTO: 37.6 % (ref 35–47)
HGB BLD-MCNC: 13.3 G/DL (ref 11.7–15.7)
INR PPP: 1.83 (ref 0.86–1.14)
MCH RBC QN AUTO: 38.8 PG (ref 26.5–33)
MCHC RBC AUTO-ENTMCNC: 35.4 G/DL (ref 31.5–36.5)
MCV RBC AUTO: 110 FL (ref 78–100)
PLATELET # BLD AUTO: 65 10E9/L (ref 150–450)
POTASSIUM SERPL-SCNC: 4.3 MMOL/L (ref 3.4–5.3)
PROT SERPL-MCNC: 6.6 G/DL (ref 6.8–8.8)
RBC # BLD AUTO: 3.43 10E12/L (ref 3.8–5.2)
SODIUM SERPL-SCNC: 137 MMOL/L (ref 133–144)
SPECIMEN EXP DATE BLD: NORMAL
WBC # BLD AUTO: 4.9 10E9/L (ref 4–11)

## 2018-04-24 PROCEDURE — 85027 COMPLETE CBC AUTOMATED: CPT | Performed by: INTERNAL MEDICINE

## 2018-04-24 PROCEDURE — 86900 BLOOD TYPING SEROLOGIC ABO: CPT | Performed by: INTERNAL MEDICINE

## 2018-04-24 PROCEDURE — 86901 BLOOD TYPING SEROLOGIC RH(D): CPT | Performed by: INTERNAL MEDICINE

## 2018-04-24 PROCEDURE — 80048 BASIC METABOLIC PNL TOTAL CA: CPT | Performed by: INTERNAL MEDICINE

## 2018-04-24 PROCEDURE — 86850 RBC ANTIBODY SCREEN: CPT | Performed by: INTERNAL MEDICINE

## 2018-04-24 PROCEDURE — 82105 ALPHA-FETOPROTEIN SERUM: CPT | Performed by: INTERNAL MEDICINE

## 2018-04-24 PROCEDURE — 97802 MEDICAL NUTRITION INDIV IN: CPT | Mod: ZF | Performed by: DIETITIAN, REGISTERED

## 2018-04-24 PROCEDURE — 36415 COLL VENOUS BLD VENIPUNCTURE: CPT | Performed by: INTERNAL MEDICINE

## 2018-04-24 PROCEDURE — 80076 HEPATIC FUNCTION PANEL: CPT | Performed by: INTERNAL MEDICINE

## 2018-04-24 PROCEDURE — 85610 PROTHROMBIN TIME: CPT | Performed by: INTERNAL MEDICINE

## 2018-04-24 RX ORDER — SPIRONOLACTONE 100 MG/1
TABLET, FILM COATED ORAL
Status: ON HOLD | COMMUNITY
Start: 2017-11-15 | End: 2019-08-16

## 2018-04-24 RX ORDER — ERGOCALCIFEROL (VITAMIN D2) 10 MCG
1000 TABLET ORAL
COMMUNITY
End: 2018-08-21 | Stop reason: DRUGHIGH

## 2018-04-24 RX ORDER — LEVOTHYROXINE SODIUM 125 UG/1
TABLET ORAL
Status: ON HOLD | COMMUNITY
Start: 2017-11-15 | End: 2019-08-16

## 2018-04-24 RX ORDER — PHENTERMINE HYDROCHLORIDE 37.5 MG/1
TABLET ORAL
COMMUNITY
Start: 2017-10-25 | End: 2018-08-21

## 2018-04-24 RX ORDER — MAGNESIUM OXIDE 400 MG/1
400 TABLET ORAL DAILY
Status: ON HOLD | COMMUNITY
End: 2019-09-21

## 2018-04-24 NOTE — PROGRESS NOTES
"Outpatient MNT: Liver Consult    Time Spent: 30 minutes  Visit Type: Initial  Referring Physician: Dr Alford   Pt accompanied by: her , Hugo     History of previous txp: none    Nutrition Assessment  Pt cooks at home and adds \"a little salt\" and trying to reduce overall sodium consumption x 1 year. She reports buying canned veggies, sometimes no added salt versions. Pt and  are both interested in eating healthier and losing weight. She reports the cause of her liver disease is a mystery, potentially from iron overload, but she also has fatty liver.     Vitamins, Supplements, Pertinent Meds: vit D, magnesium oxide (for leg cramps)  Herbal Medicines/Supplements: none     Diet Recall  Breakfast Oats, waffles, cereal, sometimes will have toast, breakfast meats, some eggs OR premier protein shake 2-3x/week for the past 6 months   Lunch Salad every other day (with chicken or other protein + cheese) or deli meat s/w (turkey, ham, tuna or PBJ - reports monitoring portion of hull or jam/PB on s/w)   Dinner chix or fish + salad or canned veggie, sometimes potatoes    Snacks Some candy or popcorn, triple zero greek yogurt, some ice cream, fruit    Beverages Water, a few diet soda/week, 4 ounces juice 5x/week, coffee with creamer, occasional Gatorade, 16 oz skim milk    Dining out 1x/week - Mexican foods, cafe (breakfast foods), Chinese     Physical Activity  Pt reports chronic knee pain so both biking and walking are painful for her. She does have a stationary bike at home, which she has ridden for 1 hour, but does report pain with this. Is considering getting a cortisone shot, which has helped her with pain before. If pain subsides, would like to ride her bike nightly.      Anthropometrics  Height:   62 in   BMI:    40.3    Weight Status:Obesity Grade III BMI >40   Weight:  220 lbs            IBW (lb): 110  % IBW: 200    Wt Hx: Pt reports intentional 50 lb weight loss x 1 year but cutting out most snacks, " sweets, and reducing fast food. Current weight fluctuates some, with report of + pedal edema.     Adj/dosing BW: 138 lbs/63 kg       Malnutrition  % Intake: Decreased intake does not meet criteria for malnutrition   % Weight Loss: Weight loss does not meet criteria for malnutrition   Subcutaneous Fat Loss: does not meet criteria   Muscle Loss: Mild  Fluid Accumulation/Edema: Mild   Malnutrition Diagnosis: Non-Severe malnutrition in the context of chronic illness    Estimated Nutrition Needs  Energy  4815-5231     (20-25 kcal/kg dosing BW for desired wt loss)     Protein  63-76    (1-1.2 g/kg for increased needs)           Fluid  1 ml/kcal or per MD        Micronutrient   Na+: <2000 mg/day            Nutrition Diagnosis  Food and nutrition related knowledge deficit r/t no previous diet education r/t liver disease AEB pt report, liver consult for RD visit.     Nutrition Intervention  Nutrition education provided:  Discussed sodium intake (low sodium foods and drinks, seasoning food without salt and tips for low sodium diet). Pt overall doing well with this. Encouraged her to purchase low sodium deli turkey if able.     Reviewed adequate protein intake. Encouraged receiving protein from both animal and plant based sources. Encouraged increased protein intake and by default, hopefully reduced carb/starch consumption to help with weight and liver disease. Encouraged protein shakes more often, eating eggs for BF more often, plain Greek yogurt with frozen berries and chopped nuts, splitting a baked potato with  or eat baked potatoes fewer times during the week, etc. Provided list of protein sources, goal protein range for consumption, and also emailed list of iron content of foods per pt request. Although her liver disease etiology seems unclear, would not over restrict higher iron foods at this time until the cause is known.     Did not review post transplant diet guidelines at this time d/t liver consult only.      Patient Understanding: Pt verbalized understanding of education provided.  Expected Compliance: Good  Follow-Up Plans: PRN     Nutrition Goals  1. Limit Na+ <2000mg/day  2. Ideal minimum protein intake of 63 g/day    Provided pt with contact info.   Genet Zhang RD, LD  [facility] [phone]

## 2018-04-24 NOTE — MR AVS SNAPSHOT
"              After Visit Summary   2018    Nicci Pemberton    MRN: [id number]           Patient Information     Date Of Birth          1960        Visit Information        Provider Department      2018 9:30 AM Criss Zhang RD [facility] Solid Organ Transplant        Today's Diagnoses     Cirrhosis (H)    -  1       Follow-ups after your visit        Who to contact     If you have questions or need follow up information about today's clinic visit or your schedule please contact [facility] SOLID ORGAN TRANSPLANT directly at [phone].  Normal or non-critical lab and imaging results will be communicated to you by [employer], letter or phone within 4 business days after the clinic has received the results. If you do not hear from us within 7 days, please contact the clinic through [employer] or phone. If you have a critical or abnormal lab result, we will notify you by phone as soon as possible.  Submit refill requests through [employer] or call your pharmacy and they will forward the refill request to us. Please allow 3 business days for your refill to be completed.          Additional Information About Your Visit        MyChart Information     [email or web address] lets you send messages to your doctor, view your test results, renew your prescriptions, schedule appointments and more. To sign up, go to [email or web address] . Click on \"Log in\" on the left side of the screen, which will take you to the Welcome page. Then click on \"Sign up Now\" on the right side of the page.     You will be asked to enter the access code listed below, as well as some personal information. Please follow the directions to create your username and password.     Your access code is: [email or web address]  Expires: 2018 11:31 AM     Your access code will  in 90 days. If you need help or a new code, please call your [place] clinic or [phone].        Care EveryWhere ID     This is your Care EveryWhere ID. This could be used by other " organizations to access your [facility] medical records  [id number]         Blood Pressure from Last 3 Encounters:   04/24/18 117/76    Weight from Last 3 Encounters:   04/24/18 100 kg (220 lb 8 oz)              We Performed the Following     MNT INDIVIDUAL INITIAL EA 15 MIN        Primary Care Provider Office Phone # Fax #    Pedro Ricardo [phone]       [place] [address] [address] [facility] [address]        Equal Access to Services     BEATRIZ GONCALVES : Hadii aad ku hadasho Soomaali, waaxda luqadaha, qaybta kaalmada adeegyada, waxay idiin hayaan adeeg kharash la'aan . So [facility] [phone].    ATENCIÓN: Si habla español, tiene a gaston disposición servicios gratuitos de asistencia lingüística. [facility] [phone].    We comply with applicable federal civil rights laws and [place] laws. We do not discriminate on the basis of race, color, national origin, age, disability, sex, sexual orientation, or gender identity.            Thank you!     Thank you for choosing [facility] SOLID ORGAN TRANSPLANT  for your care. Our goal is always to provide you with excellent care. Hearing back from our patients is one way we can continue to improve our services. Please take a few minutes to complete the written survey that you may receive in the mail after your visit with us. Thank you!             Your Updated Medication List - Protect others around you: Learn how to safely use, store and throw away your medicines at www.disposemymeds.org.          This list is accurate as of 4/24/18 12:34 PM.  Always use your most recent med list.                   Brand Name Dispense Instructions for use Diagnosis    levothyroxine 125 MCG tablet    SYNTHROID/LEVOTHROID     TK 1 T PO D        magnesium oxide 400 MG tablet    MAG-OX     Take 400 mg by mouth        phentermine 37.5 MG tablet    ADIPEX-P     TK 1/2 TO 1 T PO IN THE MORNING        spironolactone 100 MG tablet    ALDACTONE     TK 1 T PO BID        Vitamin D  (Cholecalciferol) 400 units Tabs      Take 400 Units by mouth

## 2018-04-24 NOTE — LETTER
4/24/2018       RE: Nicci Pemberton  [address] [facility] [address]     Dear Colleague,    Thank you for referring your patient, Nicci Pemberton, to the [facility] SOLID ORGAN TRANSPLANT at [facility]. Please see a copy of my visit note below.    Attended all appointments today    Again, thank you for allowing me to participate in the care of your patient.      Sincerely,    Transplant Evaluation Resource

## 2018-04-24 NOTE — LETTER
4/24/2018       RE: Nicci Pemberton  [address] [facility] [address]     Dear Colleague,    Thank you for referring your patient, Nicci Pemberton, to the [facility] HEPATOLOGY at [facility]. Please see a copy of my visit note below.    Hepatology Clinic note  Nicci Pemberton   Date of Birth 1960  Date of Service 4/24/2018  Reason for consult: New diagnosis cirrhosis  Requesting provider: MARTHA Tijerina       Impression and plan:   Nicci Pemberton is a 57 year old female with complex past medical history including iron overload with high MCV and negative HFE testing, chronic significant lymphedema of unclear etiology, chronic hypoalbuminemia, diarrhea, episodic nausea, obesity.   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     Problem list:  - Iron overload  - Hypercellular marrow  - lymphedema, severe    - bilateral knee DJD   - splenomegaly  - elevated liver enzymes, mild, chronic, hepatocellular  - Hypoalbuminemia   - intermittent episodic nausea   - high MCV  - low zinc    - Echo:   Normal LVEF, normal right heart size and function. Mild aortic sclerosis  - CT abd/pelvis with contrast   Evidence of cirrhosis, no splenomegaly   - US: no ascites.     From a liver standpoint she is decompensated with a meld sodium of 18 today.  ABO A.  I believe it's reasonable to complete liver transplant evaluation. She'd like to avoid surgery as much a possible but is willing to consider listing once evaluated in the event she decompensates further.     Plan:  Labs: UA, urine protein, Hemoglobin electrophoresis, A1ATD level, ESR, CRP Immunoglobulins PTH, gastrin chromogranin, parital cells antibodies, stool samples (chronic diarrhea) SPEP/UPEP, repeat iron panel, TSH, B12, folate  - MRI/MCRP w w/o contrast   - likely followed by a liver biopsy    RTC in 2-3 months or sooner as  "needed    Meghan Montes MD  [facility] Trans[facility] Hepatology clinic    -----------------------------------------------------       HPI:   Nicci Pemberton is a 57 year old female with history of recently discovered advanced liver disease who is referred for evaluation and consideration of liver transplantation listing due to elevated meld.    Nicci reports long-standing history of abnormal liver tests.  Believes it was initially discovered in the 1980s during routine health screening/testing at work.  She received some evaluation at the time including a liver biopsy and was informed she had \"mild fatty liver\", no specific management recommendations are made at this time.  This was completed here [place] however she is not certain of the current name of the clinic as it has changed or merged with a different entity.    She was monitored periodically with liver function tests, and notes that he had enzymes were always abnormal sometimes more than others.  More recently, she developed new symptoms including episodes or bouts of nausea not associated with vomiting.  She is not able to identify specific triggers or associating factors such as oral intake, activity, type of day or season.  This was apparent at the time of her upper endoscopy where despite following exact instructions was large amount of fluid in her abdomen and the procedure had to be repeated at a later date.    Additionally, she was noted to be jaundice in early 2017 which is new at the same time she was noted to have very abnormal, iron panel including a ferritin of 1300, iron saturation of 100%, and a  high MCV.  Because of the suggestion of iron overload she underwent phlebotomy ×1.  When she was evaluated by Dr. Leija there was evidence of nodular liver on imaging suggestive of cirrhosis with signs of portal hypertension including splenomegaly and abdominal varices.  An EGD was performed and it did not show evidence of varices in " the esophagus or the stomach however there was concern for portal hypertensive gastropathy.  She was referred to hematology based on concerns for autoimmune hemolytic anemia.  She had a bone marrow biopsy, showed mildly hypercellular marrow with mild erythroid hyperplasia, no evidence of monotypic B-cell lymphocyte population, normal storage iron.  Along with her peripheral smear there was no evidence of hemolysis.    Based on her concern for nonalcoholic fatty liver disease she was encouraged to work on weight loss which she was able to achieve approximately 65 pounds weight loss over the course of last year.  She does acknowledge some water weight loss.  Interestingly, she reports to have been the lightest in weight when she was pregnant.    Historically she has had long-standing loose stools, not associated with abdominal pain or weight loss.  More recently this appeared to have changing to constipation.      Finally she does have a history of lymphedema for the past 3-4 years of unclear etiology.  She has never received abdominal or pelvic surgeries, no radiation or chemotherapy.  She has been pregnant twice in the past and were both uncomplicated and with spontaneous delivery.    The leg swelling has led to some complications locally including staph cellulitis which responded appropriately to antibiotic therapy.    Otherwise, she feels relatively stable without acute problems or specific concerns.  She continues to work full time.     No family history of liver disease, parents are alive and well.          Past Medical History:   MZ of A1ATD   Hypothyroidism          Medications:     Current Outpatient Prescriptions   Medication     levothyroxine (SYNTHROID/LEVOTHROID) 125 MCG tablet     magnesium oxide (MAG-OX) 400 MG tablet     phentermine (ADIPEX-P) 37.5 MG tablet     spironolactone (ALDACTONE) 100 MG tablet     Vitamin D, Cholecalciferol, 400 units TABS     No current facility-administered medications for  this visit.             Allergies:     Allergies   Allergen Reactions     Food      Fruits with cores      Sulfa Drugs Unknown     Swollen joints     Furosemide Rash            Social History:      reports that she quit smoking about 7 years ago. She has never used smokeless tobacco. She reports that she does not drink alcohol or use illicit drugs.   has no sexual activity history on file.           Family History:   Negative for liver disease, parents are both alive and well.   No history of A1ATD         Review of Systems:   10 points ROS was obtained and highlighted in the HPI, otherwise negative.          Physical Exam:   VS:  Reviewed.  Gen: A&Ox3, NAD  HEENT: icteric, oropharynx clear  CV: RRR  Lung: CTAB  Abd: soft, NT, ND  Ext: 3+ edema, intact pulses.   Skin: stigmata of chronic liver disease.   Neuro: no focal deficits, grossly intact, no asterixis   Psych: appropriate mood and affects       Data:   Reviewed in person and significant for:  Lab Results   Component Value Date    WBC 4.9 04/24/2018     Lab Results   Component Value Date    RBC 3.43 04/24/2018     Lab Results   Component Value Date    HGB 13.3 04/24/2018     Lab Results   Component Value Date    HCT 37.6 04/24/2018     No components found for: MCT  Lab Results   Component Value Date     04/24/2018     Lab Results   Component Value Date    MCH 38.8 04/24/2018     Lab Results   Component Value Date    MCHC 35.4 04/24/2018     Lab Results   Component Value Date    RDW 13.8 04/24/2018     Lab Results   Component Value Date    PLT 65 04/24/2018     Last Basic Metabolic Panel:  Lab Results   Component Value Date     04/24/2018      Lab Results   Component Value Date    POTASSIUM 4.3 04/24/2018     Lab Results   Component Value Date    CHLORIDE 102 04/24/2018     Lab Results   Component Value Date    JACOB 8.9 04/24/2018     Lab Results   Component Value Date    CO2 30 04/24/2018     Lab Results   Component Value Date    BUN 11 04/24/2018      Lab Results   Component Value Date    CR 0.67 04/24/2018     Lab Results   Component Value Date    GLC 80 04/24/2018     Liver Function Studies -   Recent Labs   Lab Test  04/24/18   0654   PROTTOTAL  6.6*   ALBUMIN  2.6*   BILITOTAL  3.3*   ALKPHOS  150   AST  81*   ALT  55*       Again, thank you for allowing me to participate in the care of your patient.      Sincerely,    Meghan Montes MD

## 2018-04-24 NOTE — MR AVS SNAPSHOT
"              After Visit Summary   2018    Nicci Pemberton    MRN: [id number]           Patient Information     Date Of Birth          1960        Visit Information        Provider Department      2018 7:30 AM [facility] EVALUATION [facility] Solid Organ Transplant        Today's Diagnoses     Other cirrhosis of liver (H)    -  1       Follow-ups after your visit        Who to contact     If you have questions or need follow up information about today's clinic visit or your schedule please contact [facility] SOLID ORGAN TRANSPLANT directly at [phone].  Normal or non-critical lab and imaging results will be communicated to you by Surfkitchenhart, letter or phone within 4 business days after the clinic has received the results. If you do not hear from us within 7 days, please contact the clinic through [employer]Nov[employer] or phone. If you have a critical or abnormal lab result, we will notify you by phone as soon as possible.  Submit refill requests through QuantConnect or call your pharmacy and they will forward the refill request to us. Please allow 3 business days for your refill to be completed.          Additional Information About Your Visit        MyChart Information     QuantConnect lets you send messages to your doctor, view your test results, renew your prescriptions, schedule appointments and more. To sign up, go to [email or web address] . Click on \"Log in\" on the left side of the screen, which will take you to the Welcome page. Then click on \"Sign up Now\" on the right side of the page.     You will be asked to enter the access code listed below, as well as some personal information. Please follow the directions to create your username and password.     Your access code is: [email or web address]  Expires: 2018 11:31 AM     Your access code will  in 90 days. If you need help or a new code, please call your [place] clinic or [phone].        Care EveryWhere ID     This is your Care EveryWhere ID. This could be used by " "other organizations to access your [place] medical records  [id number]        Your Vitals Were     Pulse Temperature Respirations Height Pulse Oximetry BMI (Body Mass Index)    84 97.7  F (36.5  C) 16 1.575 m (5' 2\") 100% 40.33 kg/m2       Blood Pressure from Last 3 Encounters:   04/24/18 117/76    Weight from Last 3 Encounters:   04/24/18 100 kg (220 lb 8 oz)              Today, you had the following     No orders found for display       Primary Care Provider Office Phone # Fax #    Pedro Ricardo [phone]       [place] [address] [place] [address]        Equal Access to Services     BEATRIZ GONCALVES : Hadcami Monge, walucie faustin, qalinda kaalmada adejennifer, kimi hc . So [facility] [phone].    ATENCIÓN: Si habla español, tiene a gaston disposición servicios gratuitos de asistencia lingüística. Llame al [phone].    We comply with applicable federal civil rights laws and [place] laws. We do not discriminate on the basis of race, color, national origin, age, disability, sex, sexual orientation, or gender identity.            Thank you!     Thank you for choosing [facility] SOLID ORGAN TRANSPLANT  for your care. Our goal is always to provide you with excellent care. Hearing back from our patients is one way we can continue to improve our services. Please take a few minutes to complete the written survey that you may receive in the mail after your visit with us. Thank you!             Your Updated Medication List - Protect others around you: Learn how to safely use, store and throw away your medicines at www.disposemymeds.org.          This list is accurate as of 4/24/18 11:31 AM.  Always use your most recent med list.                   Brand Name Dispense Instructions for use Diagnosis    levothyroxine 125 MCG tablet    SYNTHROID/LEVOTHROID     TK 1 T PO D        magnesium oxide 400 MG tablet    MAG-OX     Take 400 mg by mouth        " phentermine 37.5 MG tablet    ADIPEX-P     TK 1/2 TO 1 T PO IN THE MORNING        spironolactone 100 MG tablet    ALDACTONE     TK 1 T PO BID        Vitamin D (Cholecalciferol) 400 units Tabs      Take 400 Units by mouth

## 2018-04-24 NOTE — MR AVS SNAPSHOT
"              After Visit Summary   2018    Nicci Pemberton    MRN: [id number]           Patient Information     Date Of Birth          1960        Visit Information        Provider Department      2018 10:00 AM Tracy Almeida MSW [facility] Solid Organ Transplant        Today's Diagnoses     Awaiting organ transplant status    -  1       Follow-ups after your visit        Who to contact     If you have questions or need follow up information about today's clinic visit or your schedule please contact [facility] SOLID ORGAN TRANSPLANT directly at [phone].  Normal or non-critical lab and imaging results will be communicated to you by [employer]opharmaceuticalshart, letter or phone within 4 business days after the clinic has received the results. If you do not hear from us within 7 days, please contact the clinic through [employer] or phone. If you have a critical or abnormal lab result, we will notify you by phone as soon as possible.  Submit refill requests through fuseSPORT or call your pharmacy and they will forward the refill request to us. Please allow 3 business days for your refill to be completed.          Additional Information About Your Visit        MyChart Information     fuseSPORT lets you send messages to your doctor, view your test results, renew your prescriptions, schedule appointments and more. To sign up, go to [email or web address] . Click on \"Log in\" on the left side of the screen, which will take you to the Welcome page. Then click on \"Sign up Now\" on the right side of the page.     You will be asked to enter the access code listed below, as well as some personal information. Please follow the directions to create your username and password.     Your access code is: [email or web address]  Expires: 2018 11:31 AM     Your access code will  in 90 days. If you need help or a new code, please call your [place] clinic or [phone].        Care EveryWhere ID     This is your Care EveryWhere ID. This could be " used by other organizations to access your [place] medical records  [id number]         Blood Pressure from Last 3 Encounters:   04/24/18 117/76    Weight from Last 3 Encounters:   04/24/18 100 kg (220 lb 8 oz)              Today, you had the following     No orders found for display       Primary Care Provider Office Phone # Fax #    Pedro Ricardo [phone]       [place] [address] [facility] [address]        Equal Access to Services     BEATRIZ GONCALVES : Hadii aad ku hadasho Soomaali, waaxda luqadaha, qaybta kaalmada adeegyada, waxay idiin hayaan adeeg kharash la'nisan . So [facility] [phone].    ATENCIÓN: Si habla español, tiene a gaston disposición servicios gratuitos de asistencia lingüística. [facility] [phone].    We comply with applicable federal civil rights laws and [place] laws. We do not discriminate on the basis of race, color, national origin, age, disability, sex, sexual orientation, or gender identity.            Thank you!     Thank you for choosing [facility] SOLID ORGAN TRANSPLANT  for your care. Our goal is always to provide you with excellent care. Hearing back from our patients is one way we can continue to improve our services. Please take a few minutes to complete the written survey that you may receive in the mail after your visit with us. Thank you!             Your Updated Medication List - Protect others around you: Learn how to safely use, store and throw away your medicines at www.disposemymeds.org.          This list is accurate as of 4/24/18 11:59 PM.  Always use your most recent med list.                   Brand Name Dispense Instructions for use Diagnosis    levothyroxine 125 MCG tablet    SYNTHROID/LEVOTHROID     TK 1 T PO D        magnesium oxide 400 MG tablet    MAG-OX     Take 400 mg by mouth        phentermine 37.5 MG tablet    ADIPEX-P     TK 1/2 TO 1 T PO IN THE MORNING        spironolactone 100 MG tablet    ALDACTONE     TK 1 T PO BID        Vitamin D  (Cholecalciferol) 400 units Tabs      Take 400 Units by mouth

## 2018-04-24 NOTE — MR AVS SNAPSHOT
"              After Visit Summary   2018    Nicci Pemberton    MRN: [id number]           Patient Information     Date Of Birth          1960        Visit Information        Provider Department      2018 8:00 AM Meghan Montes MD [facility] Hepatology        Today's Diagnoses     Decompensated hepatic cirrhosis (H)    -  1       Follow-ups after your visit        Who to contact     If you have questions or need follow up information about today's clinic visit or your schedule please contact [facility] HEPATOLOGY directly at [phone].  Normal or non-critical lab and imaging results will be communicated to you by OneProvider.comhart, letter or phone within 4 business days after the clinic has received the results. If you do not hear from us within 7 days, please contact the clinic through [employer] Solution[employer] or phone. If you have a critical or abnormal lab result, we will notify you by phone as soon as possible.  Submit refill requests through [employer] or call your pharmacy and they will forward the refill request to us. Please allow 3 business days for your refill to be completed.          Additional Information About Your Visit        MyChart Information     [employer] lets you send messages to your doctor, view your test results, renew your prescriptions, schedule appointments and more. To sign up, go to [email or web address] . Click on \"Log in\" on the left side of the screen, which will take you to the Welcome page. Then click on \"Sign up Now\" on the right side of the page.     You will be asked to enter the access code listed below, as well as some personal information. Please follow the directions to create your username and password.     Your access code is: [email or web address]  Expires: 2018 11:31 AM     Your access code will  in 90 days. If you need help or a new code, please call your [place] clinic or [phone].        Care EveryWhere ID     This is your Care EveryWhere ID. This could be used by other " organizations to access your [place] medical records  [id number]         Blood Pressure from Last 3 Encounters:   04/24/18 117/76    Weight from Last 3 Encounters:   04/24/18 100 kg (220 lb 8 oz)               Primary Care Provider Office Phone # Fax #    Pedro Ricardo [phone]       [place] [address] [address] [facility] [place] [facility] [address]        Equal Access to Services     BEATRIZ GONCALVES : Hadii aad ku hadasho Soomaali, waaxda luqadaha, qaybta kaalmada adeegyada, waxay idiin haynisan adeeg mimi lafrancon ah. So [facility] [phone].    ATENCIÓN: Si roge terry, tiene a gaston disposición servicios gratuitos de asistencia lingüística. Llame al [phone].    We comply with applicable federal civil rights laws and [place] laws. We do not discriminate on the basis of race, color, national origin, age, disability, sex, sexual orientation, or gender identity.            Thank you!     Thank you for choosing [facility] HEPATOLOGY  for your care. Our goal is always to provide you with excellent care. Hearing back from our patients is one way we can continue to improve our services. Please take a few minutes to complete the written survey that you may receive in the mail after your visit with us. Thank you!             Your Updated Medication List - Protect others around you: Learn how to safely use, store and throw away your medicines at www.disposemymeds.org.          This list is accurate as of 4/24/18 11:59 PM.  Always use your most recent med list.                   Brand Name Dispense Instructions for use Diagnosis    levothyroxine 125 MCG tablet    SYNTHROID/LEVOTHROID     TK 1 T PO D        magnesium oxide 400 MG tablet    MAG-OX     Take 400 mg by mouth        phentermine 37.5 MG tablet    ADIPEX-P     TK 1/2 TO 1 T PO IN THE MORNING        spironolactone 100 MG tablet    ALDACTONE     TK 1 T PO BID        Vitamin D (Cholecalciferol) 400 units Tabs      Take 400 Units by mouth

## 2018-04-24 NOTE — PROGRESS NOTES
Psychosocial Assessment for Liver Transplant Consult  Nicci Pemberton was seen in the Transplant Center as part of her consult as a potential liver transplant recipient.  Her  Hugo accompanied her to her appointments today.  Living Situation: Nicci is a [age] year old  woman from [place], [place].  She and her  Hugo live together in a house they have owned since 1982.  [place] also have a [place], [place] (90 minutes away).  Nicci and Hugo deny any concerns with their current living situation.  Education/Employment:  Nicci graduated high school.  She is not a [place].  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  Her prior work history includes nine years at another [employer] store and twenty-two years at [employer].    Financial /Income: Nicci and Hugo both have income through their employment.  Hugo works full-time for [employer] as a  for the past thirty-eight years.  He has time off available to him, and [place] if needed.  Health Insurance:  Nicci is insured by [employer], through her 's employer.  Hugo reports he is considering retiring before the age of [age] and he asked questions about health insurance options.  I provided education about [employer] and how to explore health insurance options.  This writer talked with Nicci and her  about the financial risks of transplant, particularly about the high cost of transplant related medications and the importance of maintaining adequate health insurance coverage.  Family/Social Support: Nicci and Hugo have been  for thirty-six year, and their relationship is stable.  Hugo is present today and demonstrating his support.  He would be patient's primary care giver for her pre and post transplant needs.    Bryan have two sons, Wero lives in [place], [place] and Leland lives in [place], [place].   They report  "having a close relationship with both sons.  Nicci's mom lives in [place] and is the care giver for her .  Nicci's father lives in [place], [place] and is currently having health problems.  Nicci does not anticipate her parents would be able to provide any care giving.  This writer stressed the importance of having a stable and involved support network before and after transplant.  Provided Nicci and Hugo with education about the relationship between a stable support system and better surgical and post-transplant outcomes compared to patients with a limited support system.    Functional Status: Nicci is currently independent with her personal cares, medication management and ambulation.  She drives.  Chemical Dependency:  Nicci reports a history of smoking cigarettes, mostly on the weekends until 2011.  She reports marijuana and speed in her teenage years but denies any other illicit drug use.  She denies any pain mediation overuse.  In regards to alcohol, Nicci reports her last use of alcohol to be a few sips of champagne this past New Year's [place] (December 31st, 2017).  Prior to that she reports her last consumption to be one year ago.  Nicci's consumption history was \"social on weekends\" while her sons played high school hockey, over ten years ago.  Nicci has no history of chemical dependency treatment, or legal consequences of her consumption.  Mental Health: Nicci denies any mental health history.  She specifically denies any history of suicidal ideation or hospitalization for mental health treatment.  PHQ-9 score today: 2  MARY-7 score today: 2    Nicci hand wrote the following at the end of her PHQ-9/MARY-7: Reaction to current situation causes occasional \"metldowns\" and then back to normal.  I encouraged Nicci to consider counseling for adjustment to illness.  She is not currently interested in a referral.  Adjustment to Illness:  Nicci is being evaluated for liver transplant and the cause(s) of her liver " "disease need to be evaluated further.  Nicci states, \"I am a wreck being here\" and is overwhelmed with being referred for transplant evaluation.  She is hopeful she will not require transplantation but Nicci says she would want to have a liver transplant if recommended.    This writer provided Nicci  with supportive counseling throughout this interview.  This writer also encouraged Nicci to attend the liver transplant support group for additional support and encouragement.   Impression/Recommendations:   Nicci and her  Hugo verbalize understanding the psychosocial risks of transplant and teaching provided during this evaluation.  There are no contraindications to transplant from a psychosocial perspective.  Nicci has adequate finances and health insurance for transplant, a stable living situation and a plan for care giving pre and post transplant.   Her  Hugo would be her primary care giver.  Additional support and care giving would be provided by her two adult sons as needed.  There are no chemical or mental health concerns, and Nicci verbalizes understanding our policy on alcohol abstinence.    Nicci is a transplant candidate from a psychosocial perspective.  This writer will remain available to assist patient throughout the evaluation process and will follow patient through transplant if she is listed.  It was a pleasure to evaluate this patient for liver transplant.   Teaching completed during assessment:  1.     Housing and relocation needs post transplant.  2.     Caregiver needs post transplant.  3.     Financial issues related to transplant.  4.     Risks of alcohol use post transplant.  5.     Common psychosocial stressors pre/post transplant.          6.     Liver Transplant support group availability.          7.     [facility] Directive-form and education provided             Psychosocial Risks of Transplant Reviewed:  1.     Increased stress related to your emotional, family, " social, employment, or   financial situation.  2.     Affect on work and/or disability benefits.  3.     Affect on future health and life insurance.  4.     Transplant outcome expectations may not be met.  5.     Mental Health risks: anxiety, depression, PTSD, guilt, grief and chronic fatigue.     WANDA Valerio

## 2018-04-24 NOTE — PROGRESS NOTES
Hepatology Clinic note  Nicci Pemberton   Date of Birth 1960  Date of Service 4/24/2018  Reason for consult: New diagnosis cirrhosis  Requesting provider: KEVIN Tijerina       Impression and plan:   Nicci Pemberton is a 57 year old female with complex past medical history including iron overload with high MCV and negative HFE testing, chronic significant lymphedema of unclear etiology, chronic hypoalbuminemia, diarrhea, episodic nausea, obesity.   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     Problem list:  - Iron overload  - Hypercellular marrow  - lymphedema, severe    - bilateral knee DJD   - splenomegaly  - elevated liver enzymes, mild, chronic, hepatocellular  - Hypoalbuminemia   - intermittent episodic nausea   - high MCV  - low zinc    - Echo:   Normal LVEF, normal right heart size and function. Mild aortic sclerosis  - CT abd/pelvis with contrast   Evidence of cirrhosis, no splenomegaly   - US: no ascites.     From a liver standpoint she is decompensated with a meld sodium of 18 today.  ABO A.  I believe it's reasonable to complete liver transplant evaluation. She'd like to avoid surgery as much a possible but is willing to consider listing once evaluated in the event she decompensates further.     Plan:  Labs: UA, urine protein, Hemoglobin electrophoresis, A1ATD level, ESR, CRP Immunoglobulins PTH, gastrin chromogranin, parital cells antibodies, stool samples (chronic diarrhea) SPEP/UPEP, repeat iron panel, TSH, B12, folate  - MRI/MCRP w w/o contrast   - likely followed by a liver biopsy    RTC in 2-3 months or sooner as needed    Meghan Montes MD  [facility] Transplant Hepatology clinic    -----------------------------------------------------       HPI:   Nicci Pemberton is a 57 year old female with history of recently discovered advanced liver disease who is referred for evaluation and  "consideration of liver transplantation listing due to elevated meld.    Nicci reports long-standing history of abnormal liver tests.  Believes it was initially discovered in the 1980s during routine health screening/testing at work.  She received some evaluation at the time including a liver biopsy and was informed she had \"mild fatty liver\", no specific management recommendations are made at this time.  This was completed here [place] however she is not certain of the current name of the clinic as it has changed or merged with a different entity.    She was monitored periodically with liver function tests, and notes that he had enzymes were always abnormal sometimes more than others.  More recently, she developed new symptoms including episodes or bouts of nausea not associated with vomiting.  She is not able to identify specific triggers or associating factors such as oral intake, activity, type of day or season.  This was apparent at the time of her upper endoscopy where despite following exact instructions was large amount of fluid in her abdomen and the procedure had to be repeated at a later date.    Additionally, she was noted to be jaundice in early 2017 which is new at the same time she was noted to have very abnormal, iron panel including a ferritin of 1300, iron saturation of 100%, and a  high MCV.  Because of the suggestion of iron overload she underwent phlebotomy ×1.  When she was evaluated by Dr. Leija there was evidence of nodular liver on imaging suggestive of cirrhosis with signs of portal hypertension including splenomegaly and abdominal varices.  An EGD was performed and it did not show evidence of varices in the esophagus or the stomach however there was concern for portal hypertensive gastropathy.  She was referred to hematology based on concerns for autoimmune hemolytic anemia.  She had a bone marrow biopsy, showed mildly hypercellular marrow with mild erythroid hyperplasia, no evidence " of monotypic B-cell lymphocyte population, normal storage iron.  Along with her peripheral smear there was no evidence of hemolysis.    Based on her concern for nonalcoholic fatty liver disease she was encouraged to work on weight loss which she was able to achieve approximately 65 pounds weight loss over the course of last year.  She does acknowledge some water weight loss.  Interestingly, she reports to have been the lightest in weight when she was pregnant.    Historically she has had long-standing loose stools, not associated with abdominal pain or weight loss.  More recently this appeared to have changing to constipation.      Finally she does have a history of lymphedema for the past 3-4 years of unclear etiology.  She has never received abdominal or pelvic surgeries, no radiation or chemotherapy.  She has been pregnant twice in the past and were both uncomplicated and with spontaneous delivery.    The leg swelling has led to some complications locally including staph cellulitis which responded appropriately to antibiotic therapy.    Otherwise, she feels relatively stable without acute problems or specific concerns.  She continues to work full time.     No family history of liver disease, parents are alive and well.          Past Medical History:   MZ of A1ATD   Hypothyroidism          Medications:     Current Outpatient Prescriptions   Medication     levothyroxine (SYNTHROID/LEVOTHROID) 125 MCG tablet     magnesium oxide (MAG-OX) 400 MG tablet     phentermine (ADIPEX-P) 37.5 MG tablet     spironolactone (ALDACTONE) 100 MG tablet     Vitamin D, Cholecalciferol, 400 units TABS     No current facility-administered medications for this visit.             Allergies:     Allergies   Allergen Reactions     Food      Fruits with cores      Sulfa Drugs Unknown     Swollen joints     Furosemide Rash            Social History:      reports that she quit smoking about 7 years ago. She has never used smokeless tobacco.  She reports that she does not drink alcohol or use illicit drugs.   has no sexual activity history on file.           Family History:   Negative for liver disease, parents are both alive and well.   No history of A1ATD         Review of Systems:   10 points ROS was obtained and highlighted in the HPI, otherwise negative.          Physical Exam:   VS:  Reviewed.  Gen: A&Ox3, NAD  HEENT: icteric, oropharynx clear  CV: RRR  Lung: CTAB  Abd: soft, NT, ND  Ext: 3+ edema, intact pulses.   Skin: stigmata of chronic liver disease.   Neuro: no focal deficits, grossly intact, no asterixis   Psych: appropriate mood and affects       Data:   Reviewed in person and significant for:  Lab Results   Component Value Date    WBC 4.9 04/24/2018     Lab Results   Component Value Date    RBC 3.43 04/24/2018     Lab Results   Component Value Date    HGB 13.3 04/24/2018     Lab Results   Component Value Date    HCT 37.6 04/24/2018     No components found for: MCT  Lab Results   Component Value Date     04/24/2018     Lab Results   Component Value Date    MCH 38.8 04/24/2018     Lab Results   Component Value Date    MCHC 35.4 04/24/2018     Lab Results   Component Value Date    RDW 13.8 04/24/2018     Lab Results   Component Value Date    PLT 65 04/24/2018     Last Basic Metabolic Panel:  Lab Results   Component Value Date     04/24/2018      Lab Results   Component Value Date    POTASSIUM 4.3 04/24/2018     Lab Results   Component Value Date    CHLORIDE 102 04/24/2018     Lab Results   Component Value Date    JACOB 8.9 04/24/2018     Lab Results   Component Value Date    CO2 30 04/24/2018     Lab Results   Component Value Date    BUN 11 04/24/2018     Lab Results   Component Value Date    CR 0.67 04/24/2018     Lab Results   Component Value Date    GLC 80 04/24/2018     Liver Function Studies -   Recent Labs   Lab Test  04/24/18   0654   PROTTOTAL  6.6*   ALBUMIN  2.6*   BILITOTAL  3.3*   ALKPHOS  150   AST  81*   ALT  55*

## 2018-04-30 ENCOUNTER — DOCUMENTATION ONLY (OUTPATIENT)
Dept: TRANSPLANT | Facility: CLINIC | Age: 58
End: 2018-04-30

## 2018-04-30 ENCOUNTER — TELEPHONE (OUTPATIENT)
Dept: GASTROENTEROLOGY | Facility: CLINIC | Age: 58
End: 2018-04-30

## 2018-04-30 DIAGNOSIS — K74.60 CIRRHOSIS (H): Primary | ICD-10-CM

## 2018-04-30 NOTE — Clinical Note
[address],   Pt needs to be scheduled for txp eval (including colonoscopy). She already saw hepatology, nutrition and SW, so could see surgery on a non-eval day. She does need f/u with Dr. Montes scheduled, guessing next avail is 3 months out which would be appropriate. In addition to the eval orders she also needs MRI (see order from Dr. Montes).  Pt aware of this plan. Please let me know if you have questions.  Thanks,  Monae

## 2018-05-01 ENCOUNTER — HOSPITAL ENCOUNTER (OUTPATIENT)
Facility: AMBULATORY SURGERY CENTER | Age: 58
End: 2018-05-01
Attending: INTERNAL MEDICINE
Payer: COMMERCIAL

## 2018-05-04 ENCOUNTER — RECORDS - HEALTHEAST (OUTPATIENT)
Dept: LAB | Facility: CLINIC | Age: 58
End: 2018-05-04

## 2018-05-06 LAB — BACTERIA SPEC CULT: ABNORMAL

## 2018-05-08 ENCOUNTER — TELEPHONE (OUTPATIENT)
Dept: TRANSPLANT | Facility: CLINIC | Age: 58
End: 2018-05-08

## 2018-05-08 NOTE — TELEPHONE ENCOUNTER
May 8, 2018: I called Nicci to make appointments for her evaluation including the 3-mo hepatologist appointment.    Alma Rosa Pennington  Pre-Liver Transplant   [phone]    Message  Received: 1 week ago       Monae Tapia RN [place], Karen E       Pt needs to be scheduled for txp eval (including colonoscopy). She already saw hepatology, nutrition and SW, so could see surgery on a non-eval day. She does need f/u with Dr. Montes scheduled, guessing next avail is 3 months out which would be appropriate. In addition to the eval orders she also needs MRI (see order from Dr. Montes).  Pt aware of this plan.           Documentation Only for Transplant Evaluation  4/30/2018       Monae Tapia RN - [facility] Solid Organ Transplant Encounter Summary       Diagnosis       Cirrhosis (h) (Primary)              Orders Signed This Encounter (28)      Dexa hip/pelvis/spine [[id number]]        Hepatitis C antibody        UA with Microscopic reflex to Culture        Anti Treponema        HIV Antigen Antibody Combo Pretransplant        Hepatitis B surface antigen        Hepatitis B Surface Antibody        Hepatitis B core antibody        Hepatitis A Antibody IgG        EBV Capsid Antibody IgG        CMV Antibody IgG        Protein  random urine with Creat Ratio        Vitamin D Deficiency        TSH with free T4 reflex        M Tuberculosis by Quantiferon        HCG qualitative (female only)        Lipid Profile        Antibody titer red cell        ABO type [[id number]]        PFT Lab Testing        Echo dobutamine stress test        EKG 12-lead, tracing only [EKG1]        GASTROENTEROLOGY ADULT REF PROCEDURE ONLY        US Abdomen Complete w Doppler Complete        X-ray Chest 2 vws [IMG36]        CARDIOLOGY EVAL ADULT REFERRAL        GENERAL SURG ADULT REFERRAL        Pre-Transplant Class

## 2018-05-08 NOTE — LETTER
PRE-LIVER TRANSPLANT APPOINTMENT SCHEDULE    Patient:   Nicci Pemberton  MR#:    [id number]  Coordinator:  Monae Tapia RN  [phone]  :    Alma Rosa BUCKNER   [phone]  Location:    [facility]  Date:    June 18, June 22, June 27 and August 21, 2018      Do not take calcium pills or supplements 48 hours before the DEXA scan    Day/Date:  Monday, June 18, 2018  Time Location Activity   9:15 am &  9:30 am Imaging and Lab testing  ([address] [place] [address] and Surgery Center,  [address] [place] [address]; [facility] [address]) Blood tests   NO FOOD OR DRINK AFTER MIDNIGHT   10:00 am to 11:45 am Transplant Services  ([facility] floor [facility] and Surgery [place]) Pre-transplant class and meet with Monae Tapia RN, Transplant Coordinator   11:50 am Transplant Services  ([address] [place] [address] and Surgery [place]) Appointment with Dr. Alford,  Transplant Surgeon   1:00 pm Imaging and Lab Testing  ([address] [place] [address] and Surgery [place]) Dexa Scan    DO NOT TAKE CALCIUM PILLS OR SUPPLEMENTS 48 HOURS BEFORE THE TEST     * IF ONLINE CHECK IN IS NOT DONE, YOU WILL NEED TO CHECK IN AT LEAST 15 MINUTES EARLIER THAN THE FIRST SCHEDULED APPOINTMENT *        Day/Date:  Friday, June 22, 2018  Time Location Activity   7:15 am Endoscopy Clinic  ([address] [place] [address],  [address] [place] [address]; [facility] [address]) Colonoscopy or Endoscopy  or Flexible Sigmoidoscopy   PLEASE REVIEW PREPARATION MAILED TO YOU A FEW DAYS BEFORE THE EXAM.       No beta blockers the day before or day of the the dobutamine stress echo    No caffeine/alcohol twelve (12) hours before the dobutamine stress echo    No food or drink after midnight for ultrasound, MRCP and dobutamine stress echo (you may only have small amounts of water)     Day/Date:  Wednesday, June 27, 2018  Time Location Activity   7:40 am [facility] Waiting Room  (2nd [facility],  [address] [place] [address] SE; [facility] [address]) Chest X-ray    8:00 am [facility] Waiting Room  ([address] [place] [address]  [facility]) Liver Ultrasound with dopplers  NO FOOD OR DRINK AFTER MIDNIGHT   9:00 am MRI check in (Room 1-327)  (1st floor [facility]) MRCP with and without contrast  NO FOOD OR DRINK SIX (6) HOURS BEFORE TEST   10:00 am [facility] Waiting Room  (2nd floor [facility]) Dobutamine Stress Echo    See enclosed instructions for test!    No food or drink three (3) hours before test    No caffeine/alcohol twelve (12) hrs before test    No beta blockers the day before or day of the test   11:00 am [facility] - 2nd Floor/Entrance  [facility] Take [facility] [employer] Shuttle to Clinics & Surgery Center  (The shuttle is white with maroon letters)   12:00 pm [facility]   (3rd floor [facility],  [address] [place] [address]; [facility] [address]) Pulmonary Function Tests    PLEASE DO NOT WEAR ANY PERFUME, DEODORANT OR SCENTED PRODUCTS.   1:00 pm [facility]  (3rd floor [facility] [place]) EKG and appointment with  Dr. Worley, Cardiology       Day/Date:  Tuesday, August 21, 2018  Time Location Activity   7:00 am Imaging and Lab testing  (1st floor [facility],  [address] [facility] [address]; [facility] [address]) Blood tests   NO FOOD OR DRINK AFTER MIDNIGHT   8:00 am Medicine Specialties  (3rd [facility] and Surgery [place]) Appointment with Dr. Montes,  Hepatologist     Day/Date:  Every Thursday *OPTIONAL*  Time Location Activity   12:00 pm to 1:30 pm Liver Transplant Support Group  [address] [facility] [address]; [facility] [address]  Hospital Station 7B  Room 7-120 Every Thursday *OPTIONAL*

## 2018-05-14 NOTE — TELEPHONE ENCOUNTER
May 14, 2018: I spoke with Nicci; she will come for appointments on May 31 (surgeon), June 11 (class and DEXA), June 27 (US, MRI, CXR, DSE), August 21 (Dr. Montes F/U). I assured her I would send a schedule.    Alma Rosa Pennington  Pre-Liver Transplant   [phone]

## 2018-05-16 NOTE — TELEPHONE ENCOUNTER
May 16, 2018: I left a message to let Nicci know that Dr. Howard is unavailable on May 31. I told her that I will speak with her coordinator about how to proceed and asked her to call me back to discuss a new date.    (Nicci did call me to say that she got my message, reviewed her schedule and asked me to add the appointment and leave her a message.)    Alma Rosa Pennington  Pre-Liver Transplant   [phone]

## 2018-05-16 NOTE — TELEPHONE ENCOUNTER
May 16, 2018: I left a message for the patient to let her know that we could schedule the transplant class on June 18 (with the surgeon and dexa scan) if it works better for her. I asked her to call me back to discuss the schedule.    Alma Rosa Pennington  Pre-Liver Transplant   [phone]

## 2018-05-18 ENCOUNTER — TELEPHONE (OUTPATIENT)
Dept: GASTROENTEROLOGY | Facility: CLINIC | Age: 58
End: 2018-05-18

## 2018-05-25 NOTE — TELEPHONE ENCOUNTER
May 23, 2018: I spoke with Nicci who agreed to come on June 18 for class and surgeon. I assured her I would send a schedule.    Alma Rosa Pennington  Pre-Liver Transplant   [phone]

## 2018-06-04 NOTE — TELEPHONE ENCOUNTER
May 31, 2018: Nicci left a message to discuss appointments in June. She asked that I call her at work: [phone] but asked me not to leave information on VM.    June 4, 2018: I called her work number, but she was not there. I tried her home, but I did not leave a VM. I called her cell and left a message asking if she received the schedule I sent next day air on Wed, May 30. Nicci called back and left a message to state that she has the packet and would like to review the appointments.    June 5, 2018: I called iNcci's home phone and spoke with her about the prep for appointments on June 18 and June 27. She feels more comfortable, and her fasting and prep questions have been answered. I asked her to discuss additional clinical questions with her coordinator.    Alma Rosa Pennington  Pre-Liver Transplant   [phone]

## 2018-06-05 ENCOUNTER — HEALTH MAINTENANCE LETTER (OUTPATIENT)
Age: 58
End: 2018-06-05

## 2018-06-15 ENCOUNTER — TELEPHONE (OUTPATIENT)
Dept: GASTROENTEROLOGY | Facility: CLINIC | Age: 58
End: 2018-06-15

## 2018-06-15 DIAGNOSIS — Z12.11 ENCOUNTER FOR SCREENING COLONOSCOPY: Primary | ICD-10-CM

## 2018-06-15 NOTE — TELEPHONE ENCOUNTER
Patient scheduled for colonoscopy     Indication for procedure. Cirrhosis     Referring Provider. Meghan Montes MD    ? No     Arrival time verified? Patient to arrive at 7:15 am     Facility location verified? [facility]     Instructions given regarding prep and procedure. Denied prep review. Transportation policy reviewed and verbalized understanding.     Prep Type Gol[place].     Are you taking any anticoagulants or blood thinners? Denies- has low h/o low platelets     Instructions given? Yes     Electronic implanted devices? Denies     Pre procedure teaching completed? Yes    Transportation from procedure? Yes     H&P / Pre op physical completed? N/A    Nasim Cardona RN

## 2018-06-18 ENCOUNTER — RADIANT APPOINTMENT (OUTPATIENT)
Dept: BONE DENSITY | Facility: CLINIC | Age: 58
End: 2018-06-18
Attending: INTERNAL MEDICINE
Payer: COMMERCIAL

## 2018-06-18 ENCOUNTER — OFFICE VISIT (OUTPATIENT)
Dept: TRANSPLANT | Facility: CLINIC | Age: 58
End: 2018-06-18
Attending: INTERNAL MEDICINE
Payer: COMMERCIAL

## 2018-06-18 ENCOUNTER — OFFICE VISIT (OUTPATIENT)
Dept: TRANSPLANT | Facility: CLINIC | Age: 58
End: 2018-06-18
Attending: TRANSPLANT SURGERY
Payer: COMMERCIAL

## 2018-06-18 VITALS
RESPIRATION RATE: 20 BRPM | HEIGHT: 63 IN | OXYGEN SATURATION: 97 % | BODY MASS INDEX: 39.5 KG/M2 | WEIGHT: 222.9 LBS | DIASTOLIC BLOOD PRESSURE: 67 MMHG | SYSTOLIC BLOOD PRESSURE: 105 MMHG | HEART RATE: 83 BPM

## 2018-06-18 DIAGNOSIS — K74.60 CIRRHOSIS (H): ICD-10-CM

## 2018-06-18 DIAGNOSIS — K74.60 DECOMPENSATED HEPATIC CIRRHOSIS (H): ICD-10-CM

## 2018-06-18 DIAGNOSIS — Z76.82 AWAITING ORGAN TRANSPLANT STATUS: Primary | ICD-10-CM

## 2018-06-18 DIAGNOSIS — K74.60 CIRRHOSIS OF LIVER (H): Primary | ICD-10-CM

## 2018-06-18 DIAGNOSIS — K72.90 DECOMPENSATED HEPATIC CIRRHOSIS (H): ICD-10-CM

## 2018-06-18 LAB
A1AT SERPL-MCNC: 63 MG/DL (ref 80–200)
ABO + RH BLD: NORMAL
ABO + RH BLD: NORMAL
ALBUMIN UR-MCNC: NEGATIVE MG/DL
APPEARANCE UR: CLEAR
BACTERIA #/AREA URNS HPF: ABNORMAL /HPF
BILIRUB UR QL STRIP: NEGATIVE
CHOLEST SERPL-MCNC: 181 MG/DL
CMV IGG SERPL QL IA: >8 AI (ref 0–0.8)
COLOR UR AUTO: YELLOW
CREAT UR-MCNC: 61 MG/DL
CRP SERPL-MCNC: 8.9 MG/L (ref 0–8)
DEPRECATED CALCIDIOL+CALCIFEROL SERPL-MC: 16 UG/L (ref 20–75)
EBV VCA IGG SER QL IA: 7.9 AI (ref 0–0.8)
ERYTHROCYTE [SEDIMENTATION RATE] IN BLOOD BY WESTERGREN METHOD: 17 MM/H (ref 0–30)
FERRITIN SERPL-MCNC: 996 NG/ML (ref 8–252)
FOLATE SERPL-MCNC: 17.5 NG/ML
GLUCOSE UR STRIP-MCNC: NEGATIVE MG/DL
HAV IGG SER QL IA: REACTIVE
HBV CORE AB SERPL QL IA: NONREACTIVE
HBV SURFACE AB SERPL IA-ACNC: 0 M[IU]/ML
HBV SURFACE AG SERPL QL IA: NONREACTIVE
HCG SERPL QL: NEGATIVE
HDLC SERPL-MCNC: 50 MG/DL
HGB UR QL STRIP: NEGATIVE
HIV 1+2 AB+HIV1 P24 AG SERPL QL IA: NONREACTIVE
IGA SERPL-MCNC: 451 MG/DL (ref 70–380)
IGG SERPL-MCNC: 1960 MG/DL (ref 695–1620)
IGM SERPL-MCNC: 206 MG/DL (ref 60–265)
IRON SATN MFR SERPL: 90 % (ref 15–46)
IRON SERPL-MCNC: 220 UG/DL (ref 35–180)
KETONES UR STRIP-MCNC: NEGATIVE MG/DL
LDLC SERPL CALC-MCNC: 116 MG/DL
LEUKOCYTE ESTERASE UR QL STRIP: ABNORMAL
NITRATE UR QL: NEGATIVE
NONHDLC SERPL-MCNC: 131 MG/DL
PH UR STRIP: 8 PH (ref 5–7)
PROT UR-MCNC: <0.05 G/L
PROT/CREAT 24H UR: NORMAL G/G CR (ref 0–0.2)
PTH-INTACT SERPL-MCNC: 14 PG/ML (ref 18–80)
RBC #/AREA URNS AUTO: 1 /HPF (ref 0–2)
SOURCE: ABNORMAL
SP GR UR STRIP: 1.01 (ref 1–1.03)
SPECIMEN EXP DATE BLD: NORMAL
SQUAMOUS #/AREA URNS AUTO: 2 /HPF (ref 0–1)
T PALLIDUM AB SER QL: NONREACTIVE
TIBC SERPL-MCNC: 244 UG/DL (ref 240–430)
TRIGL SERPL-MCNC: 77 MG/DL
TSH SERPL DL<=0.005 MIU/L-ACNC: 2.95 MU/L (ref 0.4–4)
UROBILINOGEN UR STRIP-MCNC: 2 MG/DL (ref 0–2)
VIT B12 SERPL-MCNC: 1936 PG/ML (ref 193–986)
WBC #/AREA URNS AUTO: 8 /HPF (ref 0–5)

## 2018-06-18 PROCEDURE — 82784 ASSAY IGA/IGD/IGG/IGM EACH: CPT | Performed by: INTERNAL MEDICINE

## 2018-06-18 PROCEDURE — 81001 URINALYSIS AUTO W/SCOPE: CPT | Performed by: INTERNAL MEDICINE

## 2018-06-18 PROCEDURE — 87340 HEPATITIS B SURFACE AG IA: CPT | Performed by: INTERNAL MEDICINE

## 2018-06-18 PROCEDURE — 40000866 ZZHCL STATISTIC HIV 1/2 ANTIGEN/ANTIBODY PRETRANSPLANT ONLY: Performed by: INTERNAL MEDICINE

## 2018-06-18 PROCEDURE — 84166 PROTEIN E-PHORESIS/URINE/CSF: CPT | Performed by: INTERNAL MEDICINE

## 2018-06-18 PROCEDURE — 86140 C-REACTIVE PROTEIN: CPT | Performed by: INTERNAL MEDICINE

## 2018-06-18 PROCEDURE — 84165 PROTEIN E-PHORESIS SERUM: CPT | Performed by: INTERNAL MEDICINE

## 2018-06-18 PROCEDURE — 82941 ASSAY OF GASTRIN: CPT | Performed by: INTERNAL MEDICINE

## 2018-06-18 PROCEDURE — 86706 HEP B SURFACE ANTIBODY: CPT | Performed by: INTERNAL MEDICINE

## 2018-06-18 PROCEDURE — 84703 CHORIONIC GONADOTROPIN ASSAY: CPT | Performed by: INTERNAL MEDICINE

## 2018-06-18 PROCEDURE — 82728 ASSAY OF FERRITIN: CPT | Performed by: INTERNAL MEDICINE

## 2018-06-18 PROCEDURE — 86316 IMMUNOASSAY TUMOR OTHER: CPT | Performed by: INTERNAL MEDICINE

## 2018-06-18 PROCEDURE — 83550 IRON BINDING TEST: CPT | Performed by: INTERNAL MEDICINE

## 2018-06-18 PROCEDURE — 86900 BLOOD TYPING SEROLOGIC ABO: CPT | Performed by: INTERNAL MEDICINE

## 2018-06-18 PROCEDURE — 80061 LIPID PANEL: CPT | Performed by: INTERNAL MEDICINE

## 2018-06-18 PROCEDURE — G0463 HOSPITAL OUTPT CLINIC VISIT: HCPCS | Mod: ZF

## 2018-06-18 PROCEDURE — 85652 RBC SED RATE AUTOMATED: CPT | Performed by: INTERNAL MEDICINE

## 2018-06-18 PROCEDURE — 82746 ASSAY OF FOLIC ACID SERUM: CPT | Performed by: INTERNAL MEDICINE

## 2018-06-18 PROCEDURE — 00000402 ZZHCL STATISTIC TOTAL PROTEIN: Performed by: INTERNAL MEDICINE

## 2018-06-18 PROCEDURE — 82607 VITAMIN B-12: CPT | Performed by: INTERNAL MEDICINE

## 2018-06-18 PROCEDURE — 86665 EPSTEIN-BARR CAPSID VCA: CPT | Performed by: INTERNAL MEDICINE

## 2018-06-18 PROCEDURE — 84443 ASSAY THYROID STIM HORMONE: CPT | Performed by: INTERNAL MEDICINE

## 2018-06-18 PROCEDURE — 86780 TREPONEMA PALLIDUM: CPT | Performed by: INTERNAL MEDICINE

## 2018-06-18 PROCEDURE — 84156 ASSAY OF PROTEIN URINE: CPT | Performed by: INTERNAL MEDICINE

## 2018-06-18 PROCEDURE — 83970 ASSAY OF PARATHORMONE: CPT | Performed by: INTERNAL MEDICINE

## 2018-06-18 PROCEDURE — 86886 COOMBS TEST INDIRECT TITER: CPT | Performed by: INTERNAL MEDICINE

## 2018-06-18 PROCEDURE — 86704 HEP B CORE ANTIBODY TOTAL: CPT | Performed by: INTERNAL MEDICINE

## 2018-06-18 PROCEDURE — 82103 ALPHA-1-ANTITRYPSIN TOTAL: CPT | Performed by: INTERNAL MEDICINE

## 2018-06-18 PROCEDURE — 82306 VITAMIN D 25 HYDROXY: CPT | Performed by: INTERNAL MEDICINE

## 2018-06-18 PROCEDURE — 86644 CMV ANTIBODY: CPT | Performed by: INTERNAL MEDICINE

## 2018-06-18 PROCEDURE — 86480 TB TEST CELL IMMUN MEASURE: CPT | Performed by: INTERNAL MEDICINE

## 2018-06-18 PROCEDURE — 86708 HEPATITIS A ANTIBODY: CPT | Performed by: INTERNAL MEDICINE

## 2018-06-18 PROCEDURE — 83516 IMMUNOASSAY NONANTIBODY: CPT | Performed by: INTERNAL MEDICINE

## 2018-06-18 PROCEDURE — 83540 ASSAY OF IRON: CPT | Performed by: INTERNAL MEDICINE

## 2018-06-18 ASSESSMENT — PAIN SCALES - GENERAL: PAINLEVEL: NO PAIN (0)

## 2018-06-18 NOTE — LETTER
6/18/2018       RE: Nicci Pemberton  [address] [facility] [address]     Dear Colleague,    Thank you for referring your patient, Nicci Pemberton, to the [facility] at [facility]. Please see a copy of my visit note below.    Liver Transplant Evaluation/Teaching    TEACHING TOPICS: Evaluation Process, Evaluation Items, Diagnostic Studies, Consultation, Chemical Dependency Policy, CD Eval, Donor Source, Liver Allocation, MELD System, UNOS, Waiting List, Follow up while on transplant list, Follow up after transplantation, Infection and Rejection, Immunosuppression , Medication Teaching, Lab Recording after transplant, Laboratory Frequency after transplant , Consent for evaluation and One year survival rates  INSTRUCTIONAL MATERIAL USED/GIVEN: Liver Transplant Handbook, MELD Booklet, Donor Booklet, Web Sites Options, Verbal instructions, Multiple Listing Brochure , Consent for evaluation signed, One year survival rates and SRTR (Scientific Registry) Data  Person(s) involved in teaching: patient and her  Hugo  Asks Questions: YES  Eager to Learn: YES  Cooperative: YES  Receptive (willing/able to accept information): YES  Reason for the appointment, diagnosis and treatment plan:YES  Knowledge of proper use of medications and conditions for which they are ordered (with special attention to potential side effects or drug interactions): YES  Which situations necessitate calling provider and whom to contact:YES  Other: NA  Teaching Concerns Addressed   Comments: Patient and her  Hugo attended transplant class. Asked excellent questions. Eval consent signed.   Proper use and care of (medical equip, care aids, etc.):YES  Nutritional needs and diet plan: YES  Pain management techniques: YES  Wound Care: YES  How and/when to access community resources: YES  Patient is aware of and agrees to required commitment to post-op care and long term  follow-up: YES  Patient has name and phone number of transplant coordinator.   Time Spent face-to-face teachin minutes.  Monae Tapia RN  Liver Transplant Coordinator                 Again, thank you for allowing me to participate in the care of your patient.      Sincerely,    Transplant Class

## 2018-06-18 NOTE — LETTER
6/18/2018      RE: Nicci Pemberton  [address] [facility] [address]       HPI      ROS      Physical Exam    Assessment and Plan:  1. liver transplant evaluation - patient is a excellent candidate overall. Benefits and surgical risks of a liver transplantation were discussed.  2.  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 Portal Vein:Patent  2. Hepatic Artery: Open  3. TIPS: absent  4. Previous Abdominal Surgery: No  5. Hepatocellular Carcinoma: None  6. Ascites: Present - minimal  7. Costal Angle: wide  8. Portopulmonary Hypertension: absent  9. Hepatopulmonary Syndrome: absent  10. Cardiac Evaluation: needs stress echocardiogram  11. Nutritional Status: Good      Recommendations: MELD IS 18, consider listing and possible living donor      Patients overall evaluation will be discussed at the Liver Transplant selection committee meeting with a final recommendation on the patients suitability for transplant to be made at that time.    Consult Full  Details:  Nicci Pemberton was seen in consultation at the request of Dr. Montes  for evaluation as a potential liver transplant recipient.    Reason for Visit:  Nicci Pemberton is a 58 year old year old female with nonalcoholic steatopheatitis, who presents for liver/intestine transplant evaluation.    HPI:  Presenting complaint: Abdominal distention    Decompensated in the form of ascites, has some confusion. No variceal bleeding or encephalopathy. Does have easy fatigubility    Allergies   Allergen Reactions     Food      Fruits with cores      Sulfa Drugs Unknown     Swollen joints     Furosemide Rash     Prior to Admission medications    Medication Sig Start Date End Date Taking? Authorizing Provider   levothyroxine (SYNTHROID/LEVOTHROID) 125 MCG tablet TK 1 T PO D 11/15/17  Yes Reported, Patient   magnesium oxide (MAG-OX) 400 MG tablet Take 400 mg by mouth   Yes  "Reported, Patient   phentermine (ADIPEX-P) 37.5 MG tablet TK 1/2 TO 1 T PO IN THE MORNING 10/25/17  Yes Reported, Patient   spironolactone (ALDACTONE) 100 MG tablet TK 1 T PO BID 11/15/17  Yes Reported, Patient   Vitamin D, Cholecalciferol, 400 units TABS Take 400 Units by mouth   Yes Reported, Patient       Previous Transplant Hx: No    Cardiovascular Hx:       h/o Cardiac Issues: No       Exercise Tolerance: shortness of breath with exertion.    Potential Donor(s): No    ROS:    REVIEW OF SYSTEMS (check box if normal)  [x]                GENERAL  [x]                  PULMONARY [x]                 GENITOURINARY  [x]                 CNS                 [x]                  CARDIAC  [x]                  ENDOCRINE  [x]                 EARS,NOSE,THROAT [x]                  GASTROINTESTINAL [x]                  NEUROLOGIC    [x]                 MUSCLOSKELTAL  [x]                   HEMATOLOGY    Examination:     Vitals:  /67 (Patient Position: Sitting)  Pulse 83  Resp 20  Ht 1.588 m (5' 2.5\")  Wt 101.1 kg (222 lb 14.4 oz)  SpO2 97%  Breastfeeding? No  BMI 40.12 kg/m2    GENERAL APPEARANCE: alert and no distress  EYES: PERRL  HENT: mouth without ulcers or lesions  NECK: supple, no adenopathy  RESP: lungs clear to auscultation - no rales, rhonchi or wheezes  CV: regular rhythm, normal rate, no rub   ABDOMEN:  soft, nontender, ascites present  no HSM or masses and bowel sounds normal  MS: extremities normal- no gross deformities noted, no evidence of inflammation in joints, no muscle tenderness  SKIN: no rash  NEURO: Normal strength and tone, sensory exam grossly normal, mentation intact and speech normal  PSYCH: mentation appears normal. and affect normal/bright      Results:   Recent Results (from the past 168 hour(s))   Routine UA with microscopic    Collection Time: 06/18/18 10:19 AM   Result Value Ref Range    Color Urine Yellow     Appearance Urine Clear     Glucose Urine Negative NEG^Negative mg/dL    " Bilirubin Urine Negative NEG^Negative    Ketones Urine Negative NEG^Negative mg/dL    Specific Gravity Urine 1.014 1.003 - 1.035    Blood Urine Negative NEG^Negative    pH Urine 8.0 (H) 5.0 - 7.0 pH    Protein Albumin Urine Negative NEG^Negative mg/dL    Urobilinogen mg/dL 2.0 0.0 - 2.0 mg/dL    Nitrite Urine Negative NEG^Negative    Leukocyte Esterase Urine Small (A) NEG^Negative    Source Midstream Urine     WBC Urine 8 (H) 0 - 5 /HPF    RBC Urine 1 0 - 2 /HPF    Bacteria Urine Few (A) NEG^Negative /HPF    Squamous Epithelial /HPF Urine 2 (H) 0 - 1 /HPF   CRP inflammation    Collection Time: 06/18/18 10:24 AM   Result Value Ref Range    CRP Inflammation 8.9 (H) 0.0 - 8.0 mg/L   Lipid Profile    Collection Time: 06/18/18 10:24 AM   Result Value Ref Range    Cholesterol 181 <200 mg/dL    Triglycerides 77 <150 mg/dL    HDL Cholesterol 50 >49 mg/dL    LDL Cholesterol Calculated 116 (H) <100 mg/dL    Non HDL Cholesterol 131 (H) <130 mg/dL   TSH with free T4 reflex    Collection Time: 06/18/18 10:24 AM   Result Value Ref Range    TSH 2.95 0.40 - 4.00 mU/L   ABO type [[id number]]    Collection Time: 06/18/18 10:25 AM   Result Value Ref Range    ABO A     RH(D) Pos     Specimen Expires 06/21/2018      I had a long discussion with the patient regarding liver transplantation which included but was not limited to  the following points:    1. Liver transplant selection committee process.  2. The federal rules for cadaveric waiting list, the size and blood type matching of the organ. The availability of living-related donor transplantation.  3. The types of donors: brain death donors, non-heart beating donors, partial liver grafts: splits and living donor grafts  4. Extended criteria  Donors (older age, steasosis) and the increased  risk of primary non-function using the extended criteria donors  5. The CDC high risk donors,  Risk of donor transmitted infections and donor transmitted malignancy  6. The liver transplant operation  and the associated risks and technical complications which can include intraoperative death, post operative death,  Primary non-function, bleeding requiring re-operations, arterial and biliary complications, bowel perforations, and intra abdominal abscess. Some of these complicaitons may require a second operation.  7. The postoperative course, the ICU stay and risk of postoperative complications which can include sepsis, MI, stroke, brain injury, pneumonia, pleural effusions, and renal dysfunction.  8. The current 1 year and 5 year graft and patient survivals.  9. The need for life long immunosuppressive therapy and the side effects of these medications, including the possibility of toxicity, opportunistic infections, risk of cancer including lymphoma, and the possibility of rejection even if the patient is taking the medication exactly as prescribed.  10. The need for compliance with medications and follow-up visits in the clinic and thereafter.  11. The patient and family understand these risks and wish to proceed to transplantation       I spent 60 minutes with the patient and more than 50% of the time was spend in direct face to face counseling.  CC  AVA SABILLON    Copy to patient     [address] [facility] [address]

## 2018-06-18 NOTE — MR AVS SNAPSHOT
After Visit Summary   6/18/2018    Nicci Pemberton    MRN: [id number]           Patient Information     Date Of Birth          1960        Visit Information        Provider Department      6/18/2018 11:50 AM Mandeep Alford MD [facility]        Today's Diagnoses     Awaiting organ transplant status    -  1       Follow-ups after your visit        Your next 10 appointments already scheduled     Jun 22, 2018   Procedure with Hugo Patel MD   [facility] ([facility])    [address] [place] [address]  5th Floor  [facility] [address] [phone]           Located in the Clinics and Surgery Center at [address] [place] [address].   parking is very convenient and highly recommended.  is a $6 flat rate fee.  Both  and self parkers should enter the main arrival plaza from [facility]; parking attendants will direct you based on your parking preference.            Jun 27, 2018  7:40 AM CDT   XR CHEST 2 VIEWS with UUXR1   [facility], [place],  Radiology ([facility])    [address] [facility] [address] [phone]           Please bring a list of your current medicines to your exam. (Include vitamins, minerals and over-thecounter medicines.) Leave your valuables at home.  Tell your doctor if there is a chance you may be pregnant.  You do not need to do anything special for this exam.            Jun 27, 2018  8:00 AM CDT   US ABDOMEN/PELVIS DUPLEX COMPLETE with UUUS1   [facility])    [address] [place] [address] [phone]           Please bring a list of your medicines (including vitamins, minerals and over-the-counter drugs). Also, tell your doctor about any allergies you may have. Wear comfortable clothes and leave your valuables at home.   Adults: No eating or drinking for 8 hours before the exam. You may take medicine with a small sip of water.  Children: - Children 6+ years: No food or drink for 6 hours before exam. - Children 1-5 years: No food or drink for 4 hours before exam. - Infants, breast-fed: may have breast milk up to 2 hours before exam. - Infants, formula: may have bottle until 4 hours before exam.  Please call the Imaging Department at your exam site with any questions.            Jun 27, 2018  9:00 AM CDT   MR ABDOMEN MRCP W/O & W CONTRAST with UUMR1   [facility], [place], MRI ([facility])    [address] [facility] [address] [phone]           Take your medicines as usual, unless your doctor tells you not to. Bring a list of your current medicines to your exam (including vitamins, minerals and over-the-counter drugs). Also bring the results of similar scans you may have had.    You may or may not receive IV contrast for this exam pending the discretion of the Radiologist.   Do not eat or drink for 6 hours prior to exam.  The MRI machine uses a strong magnet. Please wear clothes without metal (snaps, zippers). A sweatsuit works well, or we may give you a hospital gown.  Please remove any body piercings and hair extensions before you arrive. You will also remove watches, jewelry, hairpins, wallets, dentures, partial dental plates and hearing aids. You may wear contact lenses, and you may be able to wear your rings. We have a safe place to keep your personal items, but it is safer to leave them at home.  **IMPORTANT** THE INSTRUCTIONS BELOW ARE ONLY FOR THOSE PATIENTS WHO HAVE BEEN PRESCRIBED SEDATION OR GENERAL ANESTHESIA DURING THEIR MRI PROCEDURE:  IF YOUR DOCTOR PRESCRIBED ORAL SEDATION (take medicine to help you relax during your exam):   You must get the medicine from your doctor (oral medication) before you arrive. Bring the medicine to the exam. Do not take it at  home. You ll be told when to take it upon arriving for your exam.   Arrive one hour early. Bring someone who can take you home after the test. Your medicine will make you sleepy. After the exam, you may not drive, take a bus or take a taxi by yourself.  IF YOUR DOCTOR PRESCRIBED IV SEDATION:   Arrive one hour early. Bring someone who can take you home after the test. Your medicine will make you sleepy. After the exam, you may not drive, take a bus or take a taxi by yourself.   No eating 6 hours before your exam. You may have clear liquids up until 4 hours before your exam. (Clear liquids include water, clear tea, black coffee and fruit juice without pulp.)  IF YOUR DOCTOR PRESCRIBED ANESTHESIA (be asleep for your exam):   Arrive 1 1/2 hours early. Bring someone who can take you home after the test. You may not drive, take a bus or take a taxi by yourself.   No eating 8 hours before your exam. You may have clear liquids up until 4 hours before your exam. (Clear liquids include water, clear tea, black coffee and fruit juice without pulp.)   You will spend four to five hours in the recovery room.  If you have any questions, please contact your Imaging Department exam site.            Jun 27, 2018 10:00 AM CDT   Ech Dobutamine Stress Test with [id number]   [facility], [place],  [facility] ([facility], [place])    [address] [facility] [id number] [phone]           1.  Please bring or wear a comfortable two-piece outfit and walking shoes. 2.  Stop eating 3 hours before the test. You may drink water or juice. 3.  Stop all caffeine 12 hours before the test. This includes coffee, tea, soda pop, chocolate and certain medicines (such as Anacin and Excederin). Also avoid decaf coffee and tea, as these contain small amounts of caffeine. 4.  No alcohol, smoking or use of other tobacco products for 12 hours before the test. 5.  Refer to your provider instructions to see if you need to  stop any medications (such as beta-blockers or nitrates) for this test. 6.  For patients with diabetes: -   If you take insulin, call your diabetes care team. Ask if you should take a   dose the morning of your test. -   If you take diabetes medicine by mouth, don't take it on the morning of your test. Bring it with you to take after the test.  (If you have questions, call your diabetes care team) 7.  When you arrive, please tell us if: -   You have diabetes. -   You have taken Viagra, Cialis or Levitra in the past 48 hours. 8.  For any questions that cannot be answered, please contact the ordering physician            Jun 27, 2018 12:00 PM CDT   FULL PULMONARY FUNCTION with  PFL B   [facility] Pulmonary Function Testing ([facility])    [address] [place] [address]  3rd Floor  [facility] [id number] [phone]            Jun 27, 2018  1:00 PM CDT   (Arrive by 12:45 PM)   NEW LIVER EVALUATION with Tricia Worley MD   [facility] Heart Care ([facility])    [address] [place] [address]  [address]  [facility] [id number] [phone]            Aug 21, 2018  7:00 AM CDT   Lab with  LAB   [facility] Lab ([facility])    [address] [place] [address]  1st Floor  [facility] [id number] [phone]            Aug 21, 2018  8:00 AM CDT   (Arrive by 7:45 AM)   Return General Liver with Meghan Montes MD   [facility] Hepatology ([facility])    [address] [place] [address]  [address]  [facility] [address] [phone]              Who to contact     If you have questions or need follow up information about today's clinic visit or your schedule please contact [facility] SOLID ORGAN TRANSPLANT directly at [phone].  Normal or non-critical lab and imaging results will be communicated to you by MyChart, letter or phone within 4 business days after the clinic has received the results. If you do not hear from us within 7 days, please contact  "the clinic through AppEnsurehart or phone. If you have a critical or abnormal lab result, we will notify you by phone as soon as possible.  Submit refill requests through Myrio or call your pharmacy and they will forward the refill request to us. Please allow 3 business days for your refill to be completed.          Additional Information About Your Visit        AppEnsurehart Information     Myrio gives you secure access to your electronic health record. If you see a primary care provider, you can also send messages to your care team and make appointments. If you have questions, please call your primary care clinic.  If you do not have a primary care provider, please call [phone] and they will assist you.        Care EveryWhere ID     This is your Care EveryWhere ID. This could be used by other organizations to access your [place] medical records  [id number]        Your Vitals Were     Pulse Respirations Height Pulse Oximetry Breastfeeding? BMI (Body Mass Index)    83 20 1.588 m (5' 2.5\") 97% No 40.12 kg/m2       Blood Pressure from Last 3 Encounters:   06/18/18 105/67   04/24/18 117/76    Weight from Last 3 Encounters:   06/18/18 101.1 kg (222 lb 14.4 oz)   04/24/18 100 kg (220 lb 8 oz)              Today, you had the following     No orders found for display       Primary Care Provider Office Phone # Fax #    Pedro Ricardo [phone]       [place] [address] [address] [facility] [address]        Equal Access to Services     BEATRIZ GONCALVES AH: Hadii alma martinezo Somaryaali, waaxda luqadaha, qaybta kaalmada adeegyada, kimi sanabria. So [facility] [phone].    ATENCIÓN: Si habla español, tiene a gaston disposición servicios gratuitos de asistencia lingüística. Ju al [phone].    We comply with applicable federal civil rights laws and [place] laws. We do not discriminate on the basis of race, color, national origin, age, disability, sex, sexual orientation, or gender " identity.            Thank you!     Thank you for choosing [facility] SOLID ORGAN TRANSPLANT  for your care. Our goal is always to provide you with excellent care. Hearing back from our patients is one way we can continue to improve our services. Please take a few minutes to complete the written survey that you may receive in the mail after your visit with us. Thank you!             Your Updated Medication List - Protect others around you: Learn how to safely use, store and throw away your medicines at www.disposemymeds.org.          This list is accurate as of 6/18/18  5:13 PM.  Always use your most recent med list.                   Brand Name Dispense Instructions for use Diagnosis    levothyroxine 125 MCG tablet    SYNTHROID/LEVOTHROID     TK 1 T PO D        magnesium oxide 400 MG tablet    MAG-OX     Take 400 mg by mouth        phentermine 37.5 MG tablet    ADIPEX-P     TK 1/2 TO 1 T PO IN THE MORNING        spironolactone 100 MG tablet    ALDACTONE     TK 1 T PO BID        Vitamin D (Cholecalciferol) 400 units Tabs      Take 400 Units by mouth

## 2018-06-18 NOTE — PROGRESS NOTES
Liver Transplant Evaluation/Teaching    TEACHING TOPICS: Evaluation Process, Evaluation Items, Diagnostic Studies, Consultation, Chemical Dependency Policy, CD Eval, Donor Source, Liver Allocation, MELD System, UNOS, Waiting List, Follow up while on transplant list, Follow up after transplantation, Infection and Rejection, Immunosuppression , Medication Teaching, Lab Recording after transplant, Laboratory Frequency after transplant , Consent for evaluation and One year survival rates  INSTRUCTIONAL MATERIAL USED/GIVEN: Liver Transplant Handbook, MELD Booklet, Donor Booklet, Web Sites Options, Verbal instructions, Multiple Listing Brochure , Consent for evaluation signed, One year survival rates and SRTR (Scientific Registry) Data  Person(s) involved in teaching: patient and her  Hugo  Asks Questions: YES  Eager to Learn: YES  Cooperative: YES  Receptive (willing/able to accept information): YES  Reason for the appointment, diagnosis and treatment plan:YES  Knowledge of proper use of medications and conditions for which they are ordered (with special attention to potential side effects or drug interactions): YES  Which situations necessitate calling provider and whom to contact:YES  Other: NA  Teaching Concerns Addressed   Comments: Patient and her  Hugo attended transplant class. Asked excellent questions. Eval consent signed.   Proper use and care of (medical equip, care aids, etc.):YES  Nutritional needs and diet plan: YES  Pain management techniques: YES  Wound Care: YES  How and/when to access community resources: YES  Patient is aware of and agrees to required commitment to post-op care and long term follow-up: YES  Patient has name and phone number of transplant coordinator.   Time Spent face-to-face teachin minutes.  Monae Tapia RN  Liver Transplant Coordinator

## 2018-06-18 NOTE — MR AVS SNAPSHOT
After Visit Summary   6/18/2018    Nicci Pemberton    MRN: [id number]           Patient Information     Date Of Birth          1960        Visit Information        Provider Department      6/18/2018 10:00 AM  TRANSPLANT CLASS [facility] Solid Organ Transplant        Today's Diagnoses     Cirrhosis of liver (H)    -  1       Follow-ups after your visit        Your next 10 appointments already scheduled     Jun 22, 2018   Procedure with Hugo Patel MD   [facility] ([facility])    [address] [place] [address]  [address] [facility] [id number] [phone]           Located in the Clinics and Surgery Center at [address] [place] [address].   parking is very convenient and highly recommended.  is a $6 flat rate fee.  Both  and self parkers should enter the main arrival plaza from [facility]; parking attendants will direct you based on your parking preference.            Jun 27, 2018  7:40 AM CDT   XR CHEST 2 VIEWS with UUXR1   [facility] ([facility], [facility])    [address] [facility] [address] [phone]           Please bring a list of your current medicines to your exam. (Include vitamins, minerals and over-thecounter medicines.) Leave your valuables at home.  Tell your doctor if there is a chance you may be pregnant.  You do not need to do anything special for this exam.            Jun 27, 2018  8:00 AM CDT   US ABDOMEN/PELVIS DUPLEX COMPLETE with UUUS1   [facility] ([facility], [facility])    [address] [place] [address] [phone]           Please bring a list of your medicines (including vitamins, minerals and over-the-counter drugs). Also, tell your doctor about any allergies you may have. Wear comfortable clothes and leave your valuables at home.  Adults: No eating  or drinking for 8 hours before the exam. You may take medicine with a small sip of water.  Children: - Children 6+ years: No food or drink for 6 hours before exam. - Children 1-5 years: No food or drink for 4 hours before exam. - Infants, breast-fed: may have breast milk up to 2 hours before exam. - Infants, formula: may have bottle until 4 hours before exam.  Please call the Imaging Department at your exam site with any questions.            Jun 27, 2018  9:00 AM CDT   MR ABDOMEN MRCP W/O & W CONTRAST with UUMR1   [facility] [place], MRI ([facility], [facility])    [address] [facility] [address] [phone]           Take your medicines as usual, unless your doctor tells you not to. Bring a list of your current medicines to your exam (including vitamins, minerals and over-the-counter drugs). Also bring the results of similar scans you may have had.    You may or may not receive IV contrast for this exam pending the discretion of the Radiologist.   Do not eat or drink for 6 hours prior to exam.  The MRI machine uses a strong magnet. Please wear clothes without metal (snaps, zippers). A sweatsuit works well, or we may give you a hospital gown.  Please remove any body piercings and hair extensions before you arrive. You will also remove watches, jewelry, hairpins, wallets, dentures, partial dental plates and hearing aids. You may wear contact lenses, and you may be able to wear your rings. We have a safe place to keep your personal items, but it is safer to leave them at home.  **IMPORTANT** THE INSTRUCTIONS BELOW ARE ONLY FOR THOSE PATIENTS WHO HAVE BEEN PRESCRIBED SEDATION OR GENERAL ANESTHESIA DURING THEIR MRI PROCEDURE:  IF YOUR DOCTOR PRESCRIBED ORAL SEDATION (take medicine to help you relax during your exam):   You must get the medicine from your doctor (oral medication) before you arrive. Bring the medicine to the exam. Do not take it at home. You ll be  told when to take it upon arriving for your exam.   Arrive one hour early. Bring someone who can take you home after the test. Your medicine will make you sleepy. After the exam, you may not drive, take a bus or take a taxi by yourself.  IF YOUR DOCTOR PRESCRIBED IV SEDATION:   Arrive one hour early. Bring someone who can take you home after the test. Your medicine will make you sleepy. After the exam, you may not drive, take a bus or take a taxi by yourself.   No eating 6 hours before your exam. You may have clear liquids up until 4 hours before your exam. (Clear liquids include water, clear tea, black coffee and fruit juice without pulp.)  IF YOUR DOCTOR PRESCRIBED ANESTHESIA (be asleep for your exam):   Arrive 1 1/2 hours early. Bring someone who can take you home after the test. You may not drive, take a bus or take a taxi by yourself.   No eating 8 hours before your exam. You may have clear liquids up until 4 hours before your exam. (Clear liquids include water, clear tea, black coffee and fruit juice without pulp.)   You will spend four to five hours in the recovery room.  If you have any questions, please contact your Imaging Department exam site.            Jun 27, 2018 10:00 AM CDT   Ech Dobutamine Stress Test with [id number]   [facility] [place],  [facility] ([facility], [facility])    [address] [facility] [id number] [phone]           1.  Please bring or wear a comfortable two-piece outfit and walking shoes. 2.  Stop eating 3 hours before the test. You may drink water or juice. 3.  Stop all caffeine 12 hours before the test. This includes coffee, tea, soda pop, chocolate and certain medicines (such as Anacin and Excederin). Also avoid decaf coffee and tea, as these contain small amounts of caffeine. 4.  No alcohol, smoking or use of other tobacco products for 12 hours before the test. 5.  Refer to your provider instructions to see if you need to stop any  medications (such as beta-blockers or nitrates) for this test. 6.  For patients with diabetes: -   If you take insulin, call your diabetes care team. Ask if you should take a   dose the morning of your test. -   If you take diabetes medicine by mouth, don't take it on the morning of your test. Bring it with you to take after the test.  (If you have questions, call your diabetes care team) 7.  When you arrive, please tell us if: -   You have diabetes. -   You have taken Viagra, Cialis or Levitra in the past 48 hours. 8.  For any questions that cannot be answered, please contact the ordering physician            Jun 27, 2018 12:00 PM CDT   FULL PULMONARY FUNCTION with  PFL B   [facility] Pulmonary Function Testing ([facility])    [address] [place] [address]  3rd Floor  [facility] [id number] [phone]            Jun 27, 2018  1:00 PM CDT   (Arrive by 12:45 PM)   NEW LIVER EVALUATION with Tricia Worley MD   [facility] Heart Care ([facility])    [address] [place] [address]  [address]  [facility] [id number] [phone]            Aug 21, 2018  7:00 AM CDT   Lab with  LAB   [facility] Lab ([facility])    [address] [place] [address]  1st Floor  [facility] [id number] [phone]            Aug 21, 2018  8:00 AM CDT   (Arrive by 7:45 AM)   Return General Liver with Meghan Montes MD   [facility] Hepatology ([facility])    [address] [place] [address]  [address]  [facility] [id number] [phone]              Who to contact     If you have questions or need follow up information about today's clinic visit or your schedule please contact [facility] SOLID ORGAN TRANSPLANT directly at [phone].  Normal or non-critical lab and imaging results will be communicated to you by MyChart, letter or phone within 4 business days after the clinic has received the results. If you do not hear from us within 7 days, please contact the clinic  through [employer] or phone. If you have a critical or abnormal lab result, we will notify you by phone as soon as possible.  Submit refill requests through [employer] or call your pharmacy and they will forward the refill request to us. Please allow 3 business days for your refill to be completed.          Additional Information About Your Visit        [employer] Information     [employer] gives you secure access to your electronic health record. If you see a primary care provider, you can also send messages to your care team and make appointments. If you have questions, please call your primary care clinic.  If you do not have a primary care provider, please call [phone] and they will assist you.        Care EveryWhere ID     This is your Care EveryWhere ID. This could be used by other organizations to access your [facility] medical records  [id number]         Blood Pressure from Last 3 Encounters:   06/18/18 105/67   04/24/18 117/76    Weight from Last 3 Encounters:   06/18/18 101.1 kg (222 lb 14.4 oz)   04/24/18 100 kg (220 lb 8 oz)              Today, you had the following     No orders found for display       Primary Care Provider Office Phone # Fax #    Pedro Ricardo [phone]       [place] [address] [address] [facility] [address]        Equal Access to Services     BEATRIZ GONCALVES AH: Hadii aad ku hadasho Soomaali, waaxda luqadaha, qaybta kaalmada adeegyada, waxay marimar sanabria. So [facility] [phone].    ATENCIÓN: Si habla español, tiene a gaston disposición servicios gratuitos de asistencia lingüística. Llame al [phone].    We comply with applicable federal civil rights laws and [place] laws. We do not discriminate on the basis of race, color, national origin, age, disability, sex, sexual orientation, or gender identity.            Thank you!     Thank you for choosing [facility] SOLID ORGAN TRANSPLANT  for your care. Our goal is always to provide you with excellent care. Hearing  back from our patients is one way we can continue to improve our services. Please take a few minutes to complete the written survey that you may receive in the mail after your visit with us. Thank you!             Your Updated Medication List - Protect others around you: Learn how to safely use, store and throw away your medicines at www.disposemymeds.org.          This list is accurate as of 6/18/18  3:18 PM.  Always use your most recent med list.                   Brand Name Dispense Instructions for use Diagnosis    levothyroxine 125 MCG tablet    SYNTHROID/LEVOTHROID     TK 1 T PO D        magnesium oxide 400 MG tablet    MAG-OX     Take 400 mg by mouth        phentermine 37.5 MG tablet    ADIPEX-P     TK 1/2 TO 1 T PO IN THE MORNING        spironolactone 100 MG tablet    ALDACTONE     TK 1 T PO BID        Vitamin D (Cholecalciferol) 400 units Tabs      Take 400 Units by mouth

## 2018-06-18 NOTE — PROGRESS NOTES
HPI      ROS      Physical Exam    Assessment and Plan:  1. liver transplant evaluation - patient is a excellent candidate overall. Benefits and surgical risks of a liver transplantation were discussed.  2.  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 Portal Vein:Patent  2. Hepatic Artery: Open  3. TIPS: absent  4. Previous Abdominal Surgery: No  5. Hepatocellular Carcinoma: None  6. Ascites: Present - minimal  7. Costal Angle: wide  8. Portopulmonary Hypertension: absent  9. Hepatopulmonary Syndrome: absent  10. Cardiac Evaluation: needs stress echocardiogram  11. Nutritional Status: Good      Recommendations: MELD IS 18, consider listing and possible living donor      Patients overall evaluation will be discussed at the Liver Transplant selection committee meeting with a final recommendation on the patients suitability for transplant to be made at that time.    Consult Full  Details:  Nicci Pemberton was seen in consultation at the request of Dr. Montes  for evaluation as a potential liver transplant recipient.    Reason for Visit:  Nicci Pemberton is a 58 year old year old female with nonalcoholic steatopheatitis, who presents for liver/intestine transplant evaluation.    HPI:  Presenting complaint: Abdominal distention    Decompensated in the form of ascites, has some confusion. No variceal bleeding or encephalopathy. Does have easy fatigubility    Allergies   Allergen Reactions     Food      Fruits with cores      Sulfa Drugs Unknown     Swollen joints     Furosemide Rash     Prior to Admission medications    Medication Sig Start Date End Date Taking? Authorizing Provider   levothyroxine (SYNTHROID/LEVOTHROID) 125 MCG tablet TK 1 T PO D 11/15/17  Yes Reported, Patient   magnesium oxide (MAG-OX) 400 MG tablet Take 400 mg by mouth   Yes Reported, Patient   phentermine (ADIPEX-P) 37.5 MG tablet TK 1/2 TO 1 T PO IN THE MORNING  "10/25/17  Yes Reported, Patient   spironolactone (ALDACTONE) 100 MG tablet TK 1 T PO BID 11/15/17  Yes Reported, Patient   Vitamin D, Cholecalciferol, 400 units TABS Take 400 Units by mouth   Yes Reported, Patient       Previous Transplant Hx: No    Cardiovascular Hx:       h/o Cardiac Issues: No       Exercise Tolerance: shortness of breath with exertion.    Potential Donor(s): No    ROS:    REVIEW OF SYSTEMS (check box if normal)  [x]                GENERAL  [x]                  PULMONARY [x]                 GENITOURINARY  [x]                 CNS                 [x]                  CARDIAC  [x]                  ENDOCRINE  [x]                 EARS,NOSE,THROAT [x]                  GASTROINTESTINAL [x]                  NEUROLOGIC    [x]                 MUSCLOSKELTAL  [x]                   HEMATOLOGY    Examination:     Vitals:  /67 (Patient Position: Sitting)  Pulse 83  Resp 20  Ht 1.588 m (5' 2.5\")  Wt 101.1 kg (222 lb 14.4 oz)  SpO2 97%  Breastfeeding? No  BMI 40.12 kg/m2    GENERAL APPEARANCE: alert and no distress  EYES: PERRL  HENT: mouth without ulcers or lesions  NECK: supple, no adenopathy  RESP: lungs clear to auscultation - no rales, rhonchi or wheezes  CV: regular rhythm, normal rate, no rub   ABDOMEN:  soft, nontender, ascites present  no HSM or masses and bowel sounds normal  MS: extremities normal- no gross deformities noted, no evidence of inflammation in joints, no muscle tenderness  SKIN: no rash  NEURO: Normal strength and tone, sensory exam grossly normal, mentation intact and speech normal  PSYCH: mentation appears normal. and affect normal/bright      Results:   Recent Results (from the past 168 hour(s))   Routine UA with microscopic    Collection Time: 06/18/18 10:19 AM   Result Value Ref Range    Color Urine Yellow     Appearance Urine Clear     Glucose Urine Negative NEG^Negative mg/dL    Bilirubin Urine Negative NEG^Negative    Ketones Urine Negative NEG^Negative mg/dL    " Specific Gravity Urine 1.014 1.003 - 1.035    Blood Urine Negative NEG^Negative    pH Urine 8.0 (H) 5.0 - 7.0 pH    Protein Albumin Urine Negative NEG^Negative mg/dL    Urobilinogen mg/dL 2.0 0.0 - 2.0 mg/dL    Nitrite Urine Negative NEG^Negative    Leukocyte Esterase Urine Small (A) NEG^Negative    Source Midstream Urine     WBC Urine 8 (H) 0 - 5 /HPF    RBC Urine 1 0 - 2 /HPF    Bacteria Urine Few (A) NEG^Negative /HPF    Squamous Epithelial /HPF Urine 2 (H) 0 - 1 /HPF   CRP inflammation    Collection Time: 06/18/18 10:24 AM   Result Value Ref Range    CRP Inflammation 8.9 (H) 0.0 - 8.0 mg/L   Lipid Profile    Collection Time: 06/18/18 10:24 AM   Result Value Ref Range    Cholesterol 181 <200 mg/dL    Triglycerides 77 <150 mg/dL    HDL Cholesterol 50 >49 mg/dL    LDL Cholesterol Calculated 116 (H) <100 mg/dL    Non HDL Cholesterol 131 (H) <130 mg/dL   TSH with free T4 reflex    Collection Time: 06/18/18 10:24 AM   Result Value Ref Range    TSH 2.95 0.40 - 4.00 mU/L   ABO type [OES4699]    Collection Time: 06/18/18 10:25 AM   Result Value Ref Range    ABO A     RH(D) Pos     Specimen Expires 06/21/2018      I had a long discussion with the patient regarding liver transplantation which included but was not limited to  the following points:    1. Liver transplant selection committee process.  2. The federal rules for cadaveric waiting list, the size and blood type matching of the organ. The availability of living-related donor transplantation.  3. The types of donors: brain death donors, non-heart beating donors, partial liver grafts: splits and living donor grafts  4. Extended criteria  Donors (older age, steasosis) and the increased  risk of primary non-function using the extended criteria donors  5. The CDC high risk donors,  Risk of donor transmitted infections and donor transmitted malignancy  6. The liver transplant operation and the associated risks and technical complications which can include intraoperative  death, post operative death,  Primary non-function, bleeding requiring re-operations, arterial and biliary complications, bowel perforations, and intra abdominal abscess. Some of these complicaitons may require a second operation.  7. The postoperative course, the ICU stay and risk of postoperative complications which can include sepsis, MI, stroke, brain injury, pneumonia, pleural effusions, and renal dysfunction.  8. The current 1 year and 5 year graft and patient survivals.  9. The need for life long immunosuppressive therapy and the side effects of these medications, including the possibility of toxicity, opportunistic infections, risk of cancer including lymphoma, and the possibility of rejection even if the patient is taking the medication exactly as prescribed.  10. The need for compliance with medications and follow-up visits in the clinic and thereafter.  11. The patient and family understand these risks and wish to proceed to transplantation       I spent 60 minutes with the patient and more than 50% of the time was spend in direct face to face counseling.  CC  AVA SABILLON    Copy to patient     [address] [facility] [address]

## 2018-06-18 NOTE — LETTER
2018      RE: Nicci Pemberton  [address] [facility] [address]       Liver Transplant Evaluation/Teaching    TEACHING TOPICS: Evaluation Process, Evaluation Items, Diagnostic Studies, Consultation, Chemical Dependency Policy, CD Eval, Donor Source, Liver Allocation, MELD System, UNOS, Waiting List, Follow up while on transplant list, Follow up after transplantation, Infection and Rejection, Immunosuppression , Medication Teaching, Lab Recording after transplant, Laboratory Frequency after transplant , Consent for evaluation and One year survival rates  INSTRUCTIONAL MATERIAL USED/GIVEN: Liver Transplant Handbook, MELD Booklet, Donor Booklet, Web Sites Options, Verbal instructions, Multiple Listing Brochure , Consent for evaluation signed, One year survival rates and SRTR (Scientific Registry) Data  Person(s) involved in teaching: patient and her  Hugo  Asks Questions: YES  Eager to Learn: YES  Cooperative: YES  Receptive (willing/able to accept information): YES  Reason for the appointment, diagnosis and treatment plan:YES  Knowledge of proper use of medications and conditions for which they are ordered (with special attention to potential side effects or drug interactions): YES  Which situations necessitate calling provider and whom to contact:YES  Other: NA  Teaching Concerns Addressed   Comments: Patient and her  Hugo attended transplant class. Asked excellent questions. Eval consent signed.   Proper use and care of (medical equip, care aids, etc.):YES  Nutritional needs and diet plan: YES  Pain management techniques: YES  Wound Care: YES  How and/when to access community resources: YES  Patient is aware of and agrees to required commitment to post-op care and long term follow-up: YES  Patient has name and phone number of transplant coordinator.   Time Spent face-to-face teachin minutes.  Monae Tapia RN  Liver Transplant Coordinator                 Transplant Class

## 2018-06-19 LAB
ALBUMIN SERPL ELPH-MCNC: 3.1 G/DL (ref 3.7–5.1)
ALPHA1 GLOB SERPL ELPH-MCNC: 0.1 G/DL (ref 0.2–0.4)
ALPHA2 GLOB SERPL ELPH-MCNC: 0.5 G/DL (ref 0.5–0.9)
B-GLOBULIN SERPL ELPH-MCNC: 0.6 G/DL (ref 0.6–1)
BLD GP AB SCN SERPL QL: NORMAL
BLD GP AB SCN TITR SERPL: NORMAL {TITER}
GAMMA GLOB SERPL ELPH-MCNC: 2 G/DL (ref 0.7–1.6)
GASTRIN SERPL-MCNC: 20 PG/ML (ref 0–100)
M PROTEIN SERPL ELPH-MCNC: 0 G/DL
PROT PATTERN SERPL ELPH-IMP: ABNORMAL
PROT PATTERN UR ELPH-IMP: NORMAL

## 2018-06-20 LAB
M TB TUBERC IFN-G BLD QL: NEGATIVE
M TB TUBERC IFN-G/MITOGEN IGNF BLD: 0 IU/ML
PCA IGG SER QL IF: 1.5 UNITS (ref 0–24.9)

## 2018-06-21 LAB — CGA SERPL-MCNC: 70 NG/ML (ref 0–95)

## 2018-06-22 ENCOUNTER — SURGERY (OUTPATIENT)
Age: 58
End: 2018-06-22

## 2018-06-22 ENCOUNTER — HOSPITAL ENCOUNTER (OUTPATIENT)
Facility: AMBULATORY SURGERY CENTER | Age: 58
End: 2018-06-22
Attending: INTERNAL MEDICINE
Payer: COMMERCIAL

## 2018-06-22 VITALS
OXYGEN SATURATION: 99 % | RESPIRATION RATE: 14 BRPM | DIASTOLIC BLOOD PRESSURE: 61 MMHG | SYSTOLIC BLOOD PRESSURE: 114 MMHG | HEIGHT: 62 IN | TEMPERATURE: 97.4 F | WEIGHT: 215 LBS | HEART RATE: 84 BPM | BODY MASS INDEX: 39.56 KG/M2

## 2018-06-22 LAB — COLONOSCOPY: NORMAL

## 2018-06-22 RX ORDER — ONDANSETRON 2 MG/ML
4 INJECTION INTRAMUSCULAR; INTRAVENOUS
Status: DISCONTINUED | OUTPATIENT
Start: 2018-06-22 | End: 2018-06-23 | Stop reason: HOSPADM

## 2018-06-22 RX ORDER — LIDOCAINE 40 MG/G
CREAM TOPICAL
Status: DISCONTINUED | OUTPATIENT
Start: 2018-06-22 | End: 2018-06-23 | Stop reason: HOSPADM

## 2018-06-22 RX ORDER — FENTANYL CITRATE 50 UG/ML
INJECTION, SOLUTION INTRAMUSCULAR; INTRAVENOUS PRN
Status: DISCONTINUED | OUTPATIENT
Start: 2018-06-22 | End: 2018-06-22 | Stop reason: HOSPADM

## 2018-06-22 RX ADMIN — FENTANYL CITRATE 50 MCG: 50 INJECTION, SOLUTION INTRAMUSCULAR; INTRAVENOUS at 08:27

## 2018-06-22 NOTE — IP AVS SNAPSHOT
MRN:[id number]                      After Visit Summary   6/22/2018    Nicci Pemberton    MRN: [id number]           Thank you!     Thank you for choosing [facility] for your care. Our goal is always to provide you with excellent care. Hearing back from our patients is one way we can continue to improve our services. Please take a few minutes to complete the written survey that you may receive in the mail after you visit with us. Thank you!        Patient Information     Date Of Birth          1960        About your hospital stay     You were admitted on:  June 22, 2018 [facility]    You were discharged on:  June 22, 2018       Who to Call     For medical emergencies, please call 911.  For non-urgent questions about your medical care, please call your primary care provider or clinic, [phone]  For questions related to your surgery, please call your surgery clinic        Attending Provider     Provider Specialty    Hugo Patel MD Gastroenterology       Primary Care Provider Office Phone # Fax #    Pedro Ricardo [phone]      Your next 10 appointments already scheduled     Jun 27, 2018  7:40 AM CDT   XR CHEST 2 VIEWS with UUXR1   [facility])    [address] [address] [phone]           Please bring a list of your current medicines to your exam. (Include vitamins, minerals and over-thecounter medicines.) Leave your valuables at home.  Tell your doctor if there is a chance you may be pregnant.  You do not need to do anything special for this exam.            Jun 27, 2018  8:00 AM CDT   US ABDOMEN/PELVIS DUPLEX COMPLETE with UUUS1   [facility] ([facility])    [address] [address] [phone]           Please bring a  list of your medicines (including vitamins, minerals and over-the-counter drugs). Also, tell your doctor about any allergies you may have. Wear comfortable clothes and leave your valuables at home.  Adults: No eating or drinking for 8 hours before the exam. You may take medicine with a small sip of water.  Children: - Children 6+ years: No food or drink for 6 hours before exam. - Children 1-5 years: No food or drink for 4 hours before exam. - Infants, breast-fed: may have breast milk up to 2 hours before exam. - Infants, formula: may have bottle until 4 hours before exam.  Please call the Imaging Department at your exam site with any questions.            Jun 27, 2018  9:00 AM CDT   MR ABDOMEN MRCP W/O & W CONTRAST with UUMR1   [facility], [place], [facility] ([facility])    [address] [address] [phone]           Take your medicines as usual, unless your doctor tells you not to. Bring a list of your current medicines to your exam (including vitamins, minerals and over-the-counter drugs). Also bring the results of similar scans you may have had.    You may or may not receive IV contrast for this exam pending the discretion of the Radiologist.   Do not eat or drink for 6 hours prior to exam.  The MRI machine uses a strong magnet. Please wear clothes without metal (snaps, zippers). A sweatsuit works well, or we may give you a hospital gown.  Please remove any body piercings and hair extensions before you arrive. You will also remove watches, jewelry, hairpins, wallets, dentures, partial dental plates and hearing aids. You may wear contact lenses, and you may be able to wear your rings. We have a safe place to keep your personal items, but it is safer to leave them at home.  **IMPORTANT** THE INSTRUCTIONS BELOW ARE ONLY FOR THOSE PATIENTS WHO HAVE BEEN PRESCRIBED SEDATION OR GENERAL ANESTHESIA DURING THEIR MRI PROCEDURE:  IF YOUR DOCTOR PRESCRIBED  ORAL SEDATION (take medicine to help you relax during your exam):   You must get the medicine from your doctor (oral medication) before you arrive. Bring the medicine to the exam. Do not take it at home. You ll be told when to take it upon arriving for your exam.   Arrive one hour early. Bring someone who can take you home after the test. Your medicine will make you sleepy. After the exam, you may not drive, take a bus or take a taxi by yourself.  IF YOUR DOCTOR PRESCRIBED IV SEDATION:   Arrive one hour early. Bring someone who can take you home after the test. Your medicine will make you sleepy. After the exam, you may not drive, take a bus or take a taxi by yourself.   No eating 6 hours before your exam. You may have clear liquids up until 4 hours before your exam. (Clear liquids include water, clear tea, black coffee and fruit juice without pulp.)  IF YOUR DOCTOR PRESCRIBED ANESTHESIA (be asleep for your exam):   Arrive 1 1/2 hours early. Bring someone who can take you home after the test. You may not drive, take a bus or take a taxi by yourself.   No eating 8 hours before your exam. You may have clear liquids up until 4 hours before your exam. (Clear liquids include water, clear tea, black coffee and fruit juice without pulp.)   You will spend four to five hours in the recovery room.  If you have any questions, please contact your Imaging Department exam site.            Jun 27, 2018 10:00 AM CDT   Ech Dobutamine Stress Test with UUEDOBR1   [facility], [place],  [facility] ([facility])    [address] [address] [phone]           1.  Please bring or wear a comfortable two-piece outfit and walking shoes. 2.  Stop eating 3 hours before the test. You may drink water or juice. 3.  Stop all caffeine 12 hours before the test. This includes coffee, tea, soda pop, chocolate and certain medicines (such as Anacin and Excederin). Also avoid decaf coffee and  tea, as these contain small amounts of caffeine. 4.  No alcohol, smoking or use of other tobacco products for 12 hours before the test. 5.  Refer to your provider instructions to see if you need to stop any medications (such as beta-blockers or nitrates) for this test. 6.  For patients with diabetes: -   If you take insulin, call your diabetes care team. Ask if you should take a   dose the morning of your test. -   If you take diabetes medicine by mouth, don't take it on the morning of your test. Bring it with you to take after the test.  (If you have questions, call your diabetes care team) 7.  When you arrive, please tell us if: -   You have diabetes. -   You have taken Viagra, Cialis or Levitra in the past 48 hours. 8.  For any questions that cannot be answered, please contact the ordering physician            Jun 27, 2018 12:00 PM CDT   FULL PULMONARY FUNCTION with  PFL B   [facility] Pulmonary Function Testing ([facility])    [address]  3rd Floor  [facility] [id number] [phone]            Jun 27, 2018  1:00 PM CDT   (Arrive by 12:45 PM)   NEW LIVER EVALUATION with Tricia Worley MD   [facility] Heart Care ([facility])    [address]  [address]  [facility] [id number] [phone]            Aug 21, 2018  7:00 AM CDT   Lab with  LAB   [facility] Lab ([facility])    [address]  1st Floor  [facility] [address] [phone]            Aug 21, 2018  8:00 AM CDT   (Arrive by 7:45 AM)   Return General Liver with Meghan Montes MD   [facility] Hepatology ([facility])    [address]  [address]  [facility] [address] [phone]              Pending Results     No orders found from 6/20/2018 to 6/23/2018.            Admission Information     Date & Time Provider Department Dept. Phone    6/22/2018 Hugo Patel MD [facility]  "[phone]      Your Vitals Were     Blood Pressure Pulse Temperature Respirations Height Weight    110/57 84 98  F (36.7  C) (Oral) 14 1.575 m (5' 2\") 97.5 kg (215 lb)    Pulse Oximetry BMI (Body Mass Index)                96% 39.32 kg/m2          [employer] Information     [employer] gives you secure access to your electronic health record. If you see a primary care provider, you can also send messages to your care team and make appointments. If you have questions, please call your primary care clinic.  If you do not have a primary care provider, please call [phone] and they will assist you.      [employer] is an electronic gateway that provides easy, online access to your medical records. With [employer], you can request a clinic appointment, read your test results, renew a prescription or communicate with your care team.     To access your existing account, please contact your [facility] or call [phone] for assistance.        Care EveryWhere ID     This is your Care EveryWhere ID. This could be used by other organizations to access your [place] medical records  [id number]        Equal Access to Services     [employer] : Hadcami Monge, fremin faustin, oliverio phipps, kimi sanabria. So [facility] [place] [phone].    ATENCIÓN: Si habla español, tiene a gaston disposición servicios gratuitos de asistencia lingüística. Llame al [phone].    We comply with applicable federal civil rights laws and [place] laws. We do not discriminate on the basis of race, color, national origin, age, disability, sex, sexual orientation, or gender identity.               Review of your medicines      UNREVIEWED medicines. Ask your doctor about these medicines        Dose / Directions    levothyroxine 125 MCG tablet   Commonly known as:  SYNTHROID/LEVOTHROID        TK 1 T PO D   Refills:  0       magnesium oxide 400 MG tablet   Commonly known as:  MAG-OX "        Dose:  400 mg   Take 400 mg by mouth   Refills:  0       phentermine 37.5 MG tablet   Commonly known as:  ADIPEX-P        TK 1/2 TO 1 T PO IN THE MORNING   Refills:  0       spironolactone 100 MG tablet   Commonly known as:  ALDACTONE        TK 1 T PO BID   Refills:  0       Vitamin D (Cholecalciferol) 400 units Tabs        Dose:  400 Units   Take 400 Units by mouth   Refills:  0                Protect others around you: Learn how to safely use, store and throw away your medicines at www.disposemymeds.org.             Medication List: This is a list of all your medications and when to take them. Check marks below indicate your daily home schedule. Keep this list as a reference.      Medications           Morning Afternoon Evening Bedtime As Needed    levothyroxine 125 MCG tablet   Commonly known as:  SYNTHROID/LEVOTHROID   TK 1 T PO D                                magnesium oxide 400 MG tablet   Commonly known as:  MAG-OX   Take 400 mg by mouth                                phentermine 37.5 MG tablet   Commonly known as:  ADIPEX-P   TK 1/2 TO 1 T PO IN THE MORNING                                spironolactone 100 MG tablet   Commonly known as:  ALDACTONE   TK 1 T PO BID                                Vitamin D (Cholecalciferol) 400 units Tabs   Take 400 Units by mouth

## 2018-06-22 NOTE — IP AVS SNAPSHOT
[facility]    [address] [place] [address]    Phone:  [phone]    Fax:  [phone]                                       After Visit Summary   6/22/2018    Nicci Pemberton    MRN: [id number]           After Visit Summary Signature Page     I have received my discharge instructions, and my questions have been answered. I have discussed any challenges I see with this plan with the nurse or doctor.    ..........................................................................................................................................  Patient/Patient Representative Signature      ..........................................................................................................................................  Patient Representative Print Name and Relationship to Patient    ..................................................               ................................................  Date                                            Time    ..........................................................................................................................................  Reviewed by Signature/Title    ...................................................              ..............................................  Date                                                            Time

## 2018-06-26 ENCOUNTER — HOSPITAL ENCOUNTER (OUTPATIENT)
Facility: CLINIC | Age: 58
Setting detail: SPECIMEN
Discharge: HOME | End: 2018-06-26
Admitting: INTERNAL MEDICINE
Payer: COMMERCIAL

## 2018-06-26 PROCEDURE — 82103 ALPHA-1-ANTITRYPSIN TOTAL: CPT | Performed by: INTERNAL MEDICINE

## 2018-06-26 PROCEDURE — 83520 IMMUNOASSAY QUANT NOS NONAB: CPT | Performed by: INTERNAL MEDICINE

## 2018-06-27 ENCOUNTER — HOSPITAL ENCOUNTER (OUTPATIENT)
Dept: ULTRASOUND IMAGING | Facility: CLINIC | Age: 58
End: 2018-06-27
Attending: INTERNAL MEDICINE
Payer: COMMERCIAL

## 2018-06-27 ENCOUNTER — HOSPITAL ENCOUNTER (OUTPATIENT)
Dept: CARDIOLOGY | Facility: CLINIC | Age: 58
Discharge: HOME | End: 2018-06-27
Attending: INTERNAL MEDICINE | Admitting: INTERNAL MEDICINE
Payer: COMMERCIAL

## 2018-06-27 ENCOUNTER — HOSPITAL ENCOUNTER (OUTPATIENT)
Dept: GENERAL RADIOLOGY | Facility: CLINIC | Age: 58
End: 2018-06-27
Attending: INTERNAL MEDICINE
Payer: COMMERCIAL

## 2018-06-27 ENCOUNTER — OFFICE VISIT (OUTPATIENT)
Dept: CARDIOLOGY | Facility: CLINIC | Age: 58
End: 2018-06-27
Attending: INTERNAL MEDICINE
Payer: COMMERCIAL

## 2018-06-27 ENCOUNTER — HOSPITAL ENCOUNTER (OUTPATIENT)
Dept: MRI IMAGING | Facility: CLINIC | Age: 58
Discharge: HOME | End: 2018-06-27
Attending: INTERNAL MEDICINE | Admitting: INTERNAL MEDICINE
Payer: COMMERCIAL

## 2018-06-27 VITALS
OXYGEN SATURATION: 99 % | BODY MASS INDEX: 41.33 KG/M2 | DIASTOLIC BLOOD PRESSURE: 62 MMHG | WEIGHT: 224.6 LBS | HEART RATE: 74 BPM | HEIGHT: 62 IN | SYSTOLIC BLOOD PRESSURE: 92 MMHG

## 2018-06-27 DIAGNOSIS — K74.60 CIRRHOSIS (H): ICD-10-CM

## 2018-06-27 DIAGNOSIS — K74.60 DECOMPENSATED HEPATIC CIRRHOSIS (H): ICD-10-CM

## 2018-06-27 DIAGNOSIS — K74.60 CIRRHOSIS OF LIVER WITHOUT ASCITES, UNSPECIFIED HEPATIC CIRRHOSIS TYPE (H): ICD-10-CM

## 2018-06-27 DIAGNOSIS — K72.90 DECOMPENSATED HEPATIC CIRRHOSIS (H): ICD-10-CM

## 2018-06-27 DIAGNOSIS — Z01.810 PRE-OPERATIVE CARDIOVASCULAR EXAMINATION: Primary | ICD-10-CM

## 2018-06-27 DIAGNOSIS — K74.60 CIRRHOSIS (H): Primary | ICD-10-CM

## 2018-06-27 PROCEDURE — 74183 MRI ABD W/O CNTR FLWD CNTR: CPT

## 2018-06-27 PROCEDURE — 93018 CV STRESS TEST I&R ONLY: CPT | Performed by: INTERNAL MEDICINE

## 2018-06-27 PROCEDURE — A9585 GADOBUTROL INJECTION: HCPCS | Performed by: INTERNAL MEDICINE

## 2018-06-27 PROCEDURE — 25000125 ZZHC RX 250: Performed by: INTERNAL MEDICINE

## 2018-06-27 PROCEDURE — 93325 DOPPLER ECHO COLOR FLOW MAPG: CPT | Mod: 26 | Performed by: INTERNAL MEDICINE

## 2018-06-27 PROCEDURE — 93017 CV STRESS TEST TRACING ONLY: CPT

## 2018-06-27 PROCEDURE — G0463 HOSPITAL OUTPT CLINIC VISIT: HCPCS | Mod: 25,ZF

## 2018-06-27 PROCEDURE — 93016 CV STRESS TEST SUPVJ ONLY: CPT | Performed by: INTERNAL MEDICINE

## 2018-06-27 PROCEDURE — 93005 ELECTROCARDIOGRAM TRACING: CPT | Mod: ZF

## 2018-06-27 PROCEDURE — 93350 STRESS TTE ONLY: CPT | Mod: 26 | Performed by: INTERNAL MEDICINE

## 2018-06-27 PROCEDURE — 25000128 H RX IP 250 OP 636: Performed by: INTERNAL MEDICINE

## 2018-06-27 PROCEDURE — 76700 US EXAM ABDOM COMPLETE: CPT | Mod: XS

## 2018-06-27 PROCEDURE — 25500064 ZZH RX 255 OP 636: Performed by: INTERNAL MEDICINE

## 2018-06-27 PROCEDURE — 93010 ELECTROCARDIOGRAM REPORT: CPT | Mod: ZP | Performed by: INTERNAL MEDICINE

## 2018-06-27 PROCEDURE — 99203 OFFICE O/P NEW LOW 30 MIN: CPT | Mod: ZP | Performed by: INTERNAL MEDICINE

## 2018-06-27 PROCEDURE — 93321 DOPPLER ECHO F-UP/LMTD STD: CPT | Mod: 26 | Performed by: INTERNAL MEDICINE

## 2018-06-27 PROCEDURE — 71046 X-RAY EXAM CHEST 2 VIEWS: CPT

## 2018-06-27 RX ORDER — SODIUM CHLORIDE 9 MG/ML
INJECTION, SOLUTION INTRAVENOUS CONTINUOUS
Status: ACTIVE | OUTPATIENT
Start: 2018-06-27 | End: 2018-06-27

## 2018-06-27 RX ORDER — GADOBUTROL 604.72 MG/ML
10 INJECTION INTRAVENOUS ONCE
Status: COMPLETED | OUTPATIENT
Start: 2018-06-27 | End: 2018-06-27

## 2018-06-27 RX ORDER — METOPROLOL TARTRATE 1 MG/ML
1-30 INJECTION, SOLUTION INTRAVENOUS
Status: DISPENSED | OUTPATIENT
Start: 2018-06-27 | End: 2018-06-27

## 2018-06-27 RX ORDER — DOBUTAMINE HYDROCHLORIDE 200 MG/100ML
10-50 INJECTION INTRAVENOUS CONTINUOUS
Status: ACTIVE | OUTPATIENT
Start: 2018-06-27 | End: 2018-06-27

## 2018-06-27 RX ADMIN — HUMAN ALBUMIN MICROSPHERES AND PERFLUTREN 6 ML: 10; .22 INJECTION, SOLUTION INTRAVENOUS at 10:49

## 2018-06-27 RX ADMIN — DOBUTAMINE IN DEXTROSE 30 MCG/KG/MIN: 200 INJECTION, SOLUTION INTRAVENOUS at 10:39

## 2018-06-27 RX ADMIN — ATROPINE SULFATE 0.2 MG: 0.4 INJECTION, SOLUTION INTRAMUSCULAR; INTRAVENOUS; SUBCUTANEOUS at 10:43

## 2018-06-27 RX ADMIN — METOROPROLOL TARTRATE 1.5 MG: 5 INJECTION, SOLUTION INTRAVENOUS at 10:48

## 2018-06-27 RX ADMIN — GADOBUTROL 10 ML: 604.72 INJECTION INTRAVENOUS at 09:03

## 2018-06-27 ASSESSMENT — PAIN SCALES - GENERAL: PAINLEVEL: NO PAIN (0)

## 2018-06-27 NOTE — MR AVS SNAPSHOT
"              After Visit Summary   2018    Nicci Pemberton    MRN: [id number]           Patient Information     Date Of Birth          1960        Visit Information        Provider Department      2018 1:00 PM Tricia Worley MD [facility]        Today's Diagnoses     Pre-op exam    -  1    Cirrhosis (H)        Pre-operative cardiovascular examination          Care Instructions    You were seen today in the Cardiovascular Clinic at the [facility].     Cardiology Providers you saw during your visit: Dr. Worley     Diagnosis:   Encounter Diagnoses   Name Primary?     Cirrhosis (H)      Pre-op exam Yes     Pre-operative cardiovascular examination         Results: Discussed with you today EKG and stress echo results      Orders:   No orders of the defined types were placed in this encounter.      Current Medication List  Current Outpatient Prescriptions   Medication Sig Dispense Refill     levothyroxine (SYNTHROID/LEVOTHROID) 125 MCG tablet TK 1 T PO D       magnesium oxide (MAG-OX) 400 MG tablet Take 400 mg by mouth daily        spironolactone (ALDACTONE) 100 MG tablet TK 1 T PO BID       Vitamin D, Cholecalciferol, 400 units TABS Take 400 Units by mouth daily        phentermine (ADIPEX-P) 37.5 MG tablet TK 1/2 TO 1 T PO IN THE MORNING           Medications Discontinued:  There are no discontinued medications.      Recommendations:   1. No change in medications    Follow-up: As needed per liver transplant team         Please feel free to call me with any questions or concerns.       Karen Mario LPN     Questions: [phone].   First press #1 for [employer] and then press #3 for \"Medical Questions\" to reach us Cardiology Nurses.     [phone].   First press #1 for the [employer] and then press #1      On Call Cardiologist for after hours or on weekends: [phone]   option #4 and ask to speak to the on-call Cardiologist.          If you need a " medication refill please contact your pharmacy.  Please allow 3 business days for your refill to be completed.  ________________________________________________________________________________________________________________________________                Follow-ups after your visit        Your next 10 appointments already scheduled     Aug 21, 2018  7:00 AM CDT   Lab with GARIMA LAB   [facility] ([facility])    [address] [place] [address]  1st Floor  [facility] [address] [phone]            Aug 21, 2018  8:00 AM CDT   (Arrive by 7:45 AM)   Return General Liver with Meghan Montes MD   [facility] Hepatology ([facility])    [address] [place] [address]  [address]  [facility] [address] [phone]              Future tests that were ordered for you today     Open Future Orders        Priority Expected Expires Ordered    ECHO STRESS DOBUTAMINE WITH OPTISON Routine  4/30/2019 4/30/2018            Who to contact     If you have questions or need follow up information about today's clinic visit or your schedule please contact [facility] HEART [facility] directly at [phone].  Normal or non-critical lab and imaging results will be communicated to you by MyChart, letter or phone within 4 business days after the clinic has received the results. If you do not hear from us within 7 days, please contact the clinic through [employer] or phone. If you have a critical or abnormal lab result, we will notify you by phone as soon as possible.  Submit refill requests through [employer] or call your pharmacy and they will forward the refill request to us. Please allow 3 business days for your refill to be completed.          Additional Information About Your Visit        [employer] Information     [employer] gives you secure access to your electronic health record. If you see a primary care provider, you can also send messages to your care team and make appointments. If you have questions, please call  "your primary care clinic.  If you do not have a primary care provider, please call [phone] and they will assist you.        Care EveryWhere ID     This is your Care EveryWhere ID. This could be used by other organizations to access your [place] medical records  [id number]        Your Vitals Were     Pulse Height Pulse Oximetry BMI (Body Mass Index)          74 1.575 m (5' 2\") 99% 41.08 kg/m2         Blood Pressure from Last 3 Encounters:   06/27/18 92/62   06/22/18 114/61   06/18/18 105/67    Weight from Last 3 Encounters:   06/27/18 101.9 kg (224 lb 9.6 oz)   06/22/18 97.5 kg (215 lb)   06/18/18 101.1 kg (222 lb 14.4 oz)              We Performed the Following     EKG 12-lead, tracing only [EKG1]        Primary Care Provider Office Phone # Fax #    Pedro Ricardo [phone]       [place] [address] [address]        Equal Access to Services     [facility]: Hadii aad ku hadasho Soomaali, waaxda luqadaha, qaybta kaalmada adejennifer, kimi ch . So [facility] [phone].    ATENCIÓN: Si habla español, tiene a gaston disposición servicios gratuitos de asistencia lingüística. Llame al [phone].    We comply with applicable federal civil rights laws and [place] laws. We do not discriminate on the basis of race, color, national origin, age, disability, sex, sexual orientation, or gender identity.            Thank you!     Thank you for choosing [facility]  for your care. Our goal is always to provide you with excellent care. Hearing back from our patients is one way we can continue to improve our services. Please take a few minutes to complete the written survey that you may receive in the mail after your visit with us. Thank you!             Your Updated Medication List - Protect others around you: Learn how to safely use, store and throw away your medicines at www.disposemymeds.org.          This list is accurate as of 6/27/18  1:17 PM.  " Always use your most recent med list.                   Brand Name Dispense Instructions for use Diagnosis    levothyroxine 125 MCG tablet    SYNTHROID/LEVOTHROID     TK 1 T PO D        magnesium oxide 400 MG tablet    MAG-OX     Take 400 mg by mouth daily        phentermine 37.5 MG tablet    ADIPEX-P     TK 1/2 TO 1 T PO IN THE MORNING        spironolactone 100 MG tablet    ALDACTONE     TK 1 T PO BID        Vitamin D (Cholecalciferol) 400 units Tabs      Take 400 Units by mouth daily

## 2018-06-27 NOTE — PATIENT INSTRUCTIONS
"You were seen today in the Cardiovascular Clinic at the [facility].     Cardiology Providers you saw during your visit: Dr. Worley     Diagnosis:   Encounter Diagnoses   Name Primary?     Cirrhosis (H)      Pre-op exam Yes     Pre-operative cardiovascular examination         Results: Discussed with you today EKG and stress echo results      Orders:   No orders of the defined types were placed in this encounter.      Current Medication List  Current Outpatient Prescriptions   Medication Sig Dispense Refill     levothyroxine (SYNTHROID/LEVOTHROID) 125 MCG tablet TK 1 T PO D       magnesium oxide (MAG-OX) 400 MG tablet Take 400 mg by mouth daily        spironolactone (ALDACTONE) 100 MG tablet TK 1 T PO BID       Vitamin D, Cholecalciferol, 400 units TABS Take 400 Units by mouth daily        phentermine (ADIPEX-P) 37.5 MG tablet TK 1/2 TO 1 T PO IN THE MORNING           Medications Discontinued:  There are no discontinued medications.      Recommendations:   1. No change in medications    Follow-up: As needed per liver transplant team         Please feel free to call me with any questions or concerns.       Karen Mario LPN     Questions: [phone].   First press #1 for the [employer] and then press #3 for \"Medical Questions\" to reach us Cardiology Nurses.     [phone].   First press #1 for the [employer] and then press #1      On Call Cardiologist for after hours or on weekends: [phone]   option #4 and ask to speak to the on-call Cardiologist.          If you need a medication refill please contact your pharmacy.  Please allow 3 business days for your refill to be completed.  ________________________________________________________________________________________________________________________________        "

## 2018-06-27 NOTE — LETTER
6/27/2018      RE: Nicci Pemberton  [address] [facility] [address]       Dear Colleague,    Thank you for the opportunity to participate in the care of your patient, Nicci Pemberton, at the [facility]. Please see a copy of my visit note below.    CARDIOLOGY CONSULTATION    Referring Provider:  Meghan Montes MD  Primary Care Provider:   Pedro Ricardo  Indication for Consultation: Preperative cardiovascular exam.    HPI: Nicci Pemberton is a 58 year old female being seen today for evaluation of cardiovascular risk with liver transplant.  The patient has no history of cardiovascular disease (CAD, CHF, arrhythmia, valvular heart disease).  The patient has no Hx of PAD or cerebrovascular disease.  The patient has not undergone prior cardiovascular evaluation and has never had an ECHO, stress study, cardiac catheterization, or EP study.   Over the last few years patient has been noted to have a heart murmur on physical exam.  Also history of chest pressure or chest discomfort about 6 years ago and she associated with grieving and loss of relatives. The patient denies any recent lymphedema or leg swelling history of chest discomfort, dyspnea, PND, orthopnea, palpitations, lightheadedness, and syncope.    Lymphedema or chronic leg swelling has been present for about 7 years.  This has been treated with twice daily diuretics and compression socks.      PAST MEDICAL HISTORY:  Past Medical History:   Diagnosis Date     Anemia      Cellulitis      Cirrhosis of liver (H) 6/18/2018     Lymphedema      Osteoarthritis        CURRENT MEDICATIONS:  Current Outpatient Prescriptions   Medication Sig Dispense Refill     levothyroxine (SYNTHROID/LEVOTHROID) 125 MCG tablet TK 1 T PO D       magnesium oxide (MAG-OX) 400 MG tablet Take 400 mg by mouth daily        spironolactone (ALDACTONE) 100 MG tablet TK 1 T PO BID       Vitamin D, Cholecalciferol, 400 units TABS Take  "400 Units by mouth daily        phentermine (ADIPEX-P) 37.5 MG tablet TK 1/2 TO 1 T PO IN THE MORNING         PAST SURGICAL HISTORY:  Past Surgical History:   Procedure Laterality Date     COLONOSCOPY N/A 6/22/2018    Procedure: COLONOSCOPY;  colonoscopy;  Surgeon: Hugo Patel MD;  Location: [facility] OR       ALLERGIES  Food; Sulfa drugs; and Furosemide    FAMILY HX:  No family history on file.    SOCIAL HX:  Social History     Social History     Marital status:      Spouse name: N/A     Number of children: N/A     Years of education: N/A     Social History Main Topics     Smoking status: Former Smoker     Quit date: 2011     Smokeless tobacco: Never Used      Comment: weekend smoker      Alcohol use No     Drug use: No     Sexual activity: Not Asked     Other Topics Concern     None     Social History Narrative       ROS:  Constitutional: No fever, chills, or sweats. No weight gain/loss.   ENT: No visual disturbance, ear ache, epistaxis, sore throat.   Allergies/Immunologic: Negative.   Respiratory: No cough, hemoptysis.   Cardiovascular: As per HPI.   GI: No nausea, vomiting, hematemesis, melena, or hematochezia.   : No urinary frequency, dysuria, or hematuria.   Integument: Negative.   Psychiatric: Negative.   Neuro: Negative.   Endocrinology: Negative.   Musculoskeletal: No myalgia.    VITAL SIGNS:  BP 92/62 (BP Location: Left arm, Patient Position: Chair, Cuff Size: Adult Large)  Pulse 74  Ht 1.575 m (5' 2\")  Wt 101.9 kg (224 lb 9.6 oz)  SpO2 99%  BMI 41.08 kg/m2  Body mass index is 41.08 kg/(m^2).  Wt Readings from Last 2 Encounters:   06/27/18 101.9 kg (224 lb 9.6 oz)   06/22/18 97.5 kg (215 lb)       PHYSICAL EXAM  Nicci Pemberton is a 58 year old female in no acute distress.  HEENT: Unremarkable.  Neck: JVP normal.  Carotids +4/4 bilaterally without bruits.  Lungs: CTA.  Cor: RRR. Normal S1 and S2.  No murmur, rub, or gallop.  PMI in Lf 5th ICS.  Abd: Soft, nontender, nondistended.  NABS.  No " pulsatile mass.  Extremities: No C/C.  Bilateral severe pitting edema of the lower extremities up to the knees.  Pulses unable to assess pulses of the lower extremities.  Neuro: Grossly intact.    LABS    Lab Results   Component Value Date    WBC 4.9 04/24/2018     Lab Results   Component Value Date    RBC 3.43 04/24/2018     Lab Results   Component Value Date    HGB 13.3 04/24/2018     Lab Results   Component Value Date    HCT 37.6 04/24/2018     No components found for: MCT  Lab Results   Component Value Date     04/24/2018     Lab Results   Component Value Date    MCH 38.8 04/24/2018     Lab Results   Component Value Date    MCHC 35.4 04/24/2018     Lab Results   Component Value Date    RDW 13.8 04/24/2018     Lab Results   Component Value Date    PLT 65 04/24/2018      Recent Labs   Lab Test  04/24/18   0654   NA  137   POTASSIUM  4.3   CHLORIDE  102   CO2  30   ANIONGAP  5   GLC  80   BUN  11   CR  0.67   JACOB  8.9     Recent Labs   Lab Test  06/18/18   1024   CHOL  181   HDL  50   LDL  116*   TRIG  77   NHDL  131*        EKG: Today EKG with normal sinus rhythm at 72 bpm.  No Q waves or ST changes.  Normal EKG.    ECHO: Today echocardiogram, based on my personal review, showed mild aortic valve sclerosis without significant stenosis.  Also with mitral annular calcification.  No significant valvular disease.  Normal biventricular size and systolic function.    STRESS TEST: Today dobutamine stress echocardiogram showed no signs of ischemia.  Normal augmentation of left ventricular systolic function without regional abnormalities.    CARDIAC CATH: Never performed    ASSESSMENT AND PLAN:    58-year-old female with no prior history of heart disease and no history of chest pain or shortness of breath.  She does have a history of heart murmur and bilateral pitting edema resistant to aggressive diuretic therapy.  Thought to have lymphedema that is also treated with stockings.  Echocardiogram today without  significant valvular disease and there was normal biventricular size and systolic function.  Heart murmur most likely associated with increased flow and aortic valve sclerosis.    - Dobutamine stress echocardiogram earlier today did not show any signs of ischemic heart disease.    - Patient was advised that it she is at low risk for cardiovascular complications with liver transplantation based on current status and test results.  If she has change in symptoms she will need to be reevaluated.    Plan is for follow-up based on liver transplant team recommendations.    Tricia Worley MD    Division of Cardiology  [address] [address]    [phone] Appointments  [phone] Fax  [phone] After hours    Clinic nurse:  Karen Mario LPN   Nurse Care Coordinator- Heart Care   [phone] option 1, than option 3          Please do not hesitate to contact me if you have any questions/concerns.     Sincerely,     Tricia Worley MD

## 2018-06-27 NOTE — PROGRESS NOTES
Pt here for dobutamine stress test. Test, meds and side effects reviewed with patient. Achieved target HR at 40 mcg/kg/min Dobutamine and a total of 0.2mg IV atropine. Gave a total of 1.5mg IV Metoprolol to bring HR back to baseline. Post monitoring complete and VSS. Pt escorted out to the gold waiting room.

## 2018-06-27 NOTE — NURSING NOTE
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 EKG was performed.  NICHELLE Nguyen  12:45 PM

## 2018-06-27 NOTE — PROGRESS NOTES
CARDIOLOGY CONSULTATION    Referring Provider:  Meghan Montes MD  Primary Care Provider:   Pedro Ricardo  Indication for Consultation: Preperative cardiovascular exam.    HPI: Nicci Pemberton is a 58 year old female being seen today for evaluation of cardiovascular risk with liver transplant.  The patient has no history of cardiovascular disease (CAD, CHF, arrhythmia, valvular heart disease).  The patient has no Hx of PAD or cerebrovascular disease.  The patient has not undergone prior cardiovascular evaluation and has never had an ECHO, stress study, cardiac catheterization, or EP study.   Over the last few years patient has been noted to have a heart murmur on physical exam.  Also history of chest pressure or chest discomfort about 6 years ago and she associated with grieving and loss of relatives. The patient denies any recent lymphedema or leg swelling history of chest discomfort, dyspnea, PND, orthopnea, palpitations, lightheadedness, and syncope.    Lymphedema or chronic leg swelling has been present for about 7 years.  This has been treated with twice daily diuretics and compression socks.      PAST MEDICAL HISTORY:  Past Medical History:   Diagnosis Date     Anemia      Cellulitis      Cirrhosis of liver (H) 6/18/2018     Lymphedema      Osteoarthritis        CURRENT MEDICATIONS:  Current Outpatient Prescriptions   Medication Sig Dispense Refill     levothyroxine (SYNTHROID/LEVOTHROID) 125 MCG tablet TK 1 T PO D       magnesium oxide (MAG-OX) 400 MG tablet Take 400 mg by mouth daily        spironolactone (ALDACTONE) 100 MG tablet TK 1 T PO BID       Vitamin D, Cholecalciferol, 400 units TABS Take 400 Units by mouth daily        phentermine (ADIPEX-P) 37.5 MG tablet TK 1/2 TO 1 T PO IN THE MORNING         PAST SURGICAL HISTORY:  Past Surgical History:   Procedure Laterality Date     COLONOSCOPY N/A 6/22/2018    Procedure: COLONOSCOPY;  colonoscopy;  Surgeon: Hugo Patel MD;  Location:  OR  "      ALLERGIES  Food; Sulfa drugs; and Furosemide    FAMILY HX:  No family history on file.    SOCIAL HX:  Social History     Social History     Marital status:      Spouse name: N/A     Number of children: N/A     Years of education: N/A     Social History Main Topics     Smoking status: Former Smoker     Quit date: 2011     Smokeless tobacco: Never Used      Comment: weekend smoker      Alcohol use No     Drug use: No     Sexual activity: Not Asked     Other Topics Concern     None     Social History Narrative       ROS:  Constitutional: No fever, chills, or sweats. No weight gain/loss.   ENT: No visual disturbance, ear ache, epistaxis, sore throat.   Allergies/Immunologic: Negative.   Respiratory: No cough, hemoptysis.   Cardiovascular: As per HPI.   GI: No nausea, vomiting, hematemesis, melena, or hematochezia.   : No urinary frequency, dysuria, or hematuria.   Integument: Negative.   Psychiatric: Negative.   Neuro: Negative.   Endocrinology: Negative.   Musculoskeletal: No myalgia.    VITAL SIGNS:  BP 92/62 (BP Location: Left arm, Patient Position: Chair, Cuff Size: Adult Large)  Pulse 74  Ht 1.575 m (5' 2\")  Wt 101.9 kg (224 lb 9.6 oz)  SpO2 99%  BMI 41.08 kg/m2  Body mass index is 41.08 kg/(m^2).  Wt Readings from Last 2 Encounters:   06/27/18 101.9 kg (224 lb 9.6 oz)   06/22/18 97.5 kg (215 lb)       PHYSICAL EXAM  Nicci Pemberton is a 58 year old female in no acute distress.  HEENT: Unremarkable.  Neck: JVP normal.  Carotids +4/4 bilaterally without bruits.  Lungs: CTA.  Cor: RRR. Normal S1 and S2.  No murmur, rub, or gallop.  PMI in Lf 5th ICS.  Abd: Soft, nontender, nondistended.  NABS.  No pulsatile mass.  Extremities: No C/C.  Bilateral severe pitting edema of the lower extremities up to the knees.  Pulses unable to assess pulses of the lower extremities.  Neuro: Grossly intact.    LABS    Lab Results   Component Value Date    WBC 4.9 04/24/2018     Lab Results   Component Value Date    " RBC 3.43 04/24/2018     Lab Results   Component Value Date    HGB 13.3 04/24/2018     Lab Results   Component Value Date    HCT 37.6 04/24/2018     No components found for: MCT  Lab Results   Component Value Date     04/24/2018     Lab Results   Component Value Date    MCH 38.8 04/24/2018     Lab Results   Component Value Date    MCHC 35.4 04/24/2018     Lab Results   Component Value Date    RDW 13.8 04/24/2018     Lab Results   Component Value Date    PLT 65 04/24/2018      Recent Labs   Lab Test  04/24/18   0654   NA  137   POTASSIUM  4.3   CHLORIDE  102   CO2  30   ANIONGAP  5   GLC  80   BUN  11   CR  0.67   JACOB  8.9     Recent Labs   Lab Test  06/18/18   1024   CHOL  181   HDL  50   LDL  116*   TRIG  77   NHDL  131*        EKG: Today EKG with normal sinus rhythm at 72 bpm.  No Q waves or ST changes.  Normal EKG.    ECHO: Today echocardiogram, based on my personal review, showed mild aortic valve sclerosis without significant stenosis.  Also with mitral annular calcification.  No significant valvular disease.  Normal biventricular size and systolic function.    STRESS TEST: Today dobutamine stress echocardiogram showed no signs of ischemia.  Normal augmentation of left ventricular systolic function without regional abnormalities.    CARDIAC CATH: Never performed    ASSESSMENT AND PLAN:    58-year-old female with no prior history of heart disease and no history of chest pain or shortness of breath.  She does have a history of heart murmur and bilateral pitting edema resistant to aggressive diuretic therapy.  Thought to have lymphedema that is also treated with stockings.  Echocardiogram today without significant valvular disease and there was normal biventricular size and systolic function.  Heart murmur most likely associated with increased flow and aortic valve sclerosis.    - Dobutamine stress echocardiogram earlier today did not show any signs of ischemic heart disease.    - Patient was advised that it  she is at low risk for cardiovascular complications with liver transplantation based on current status and test results.  If she has change in symptoms she will need to be reevaluated.    Plan is for follow-up based on liver transplant team recommendations.    Tricia Worley MD    Division of Cardiology  [facility] [address] [address]    [phone] Appointments  [phone] Fax  [phone] After hours    Clinic nurse:  Karen Mario LPN   Nurse Care Coordinator- Heart Care   [phone] option 1, than option 3

## 2018-06-28 LAB — INTERPRETATION ECG - MUSE: NORMAL

## 2018-06-29 LAB — A1AT STL-MCNT: 0.09 MG/G (ref 0–0.5)

## 2018-06-30 LAB — ELASTASE PANC STL-MCNT: >500 UG/G

## 2018-07-03 DIAGNOSIS — K74.60 CIRRHOSIS OF LIVER (H): Primary | ICD-10-CM

## 2018-07-03 NOTE — Clinical Note
Nate Small,   Please schedule pt for lab appt on 8/2/18 at 8am. No need to call her, she is aware.   Thanks,  Monae

## 2018-07-09 NOTE — PROGRESS NOTES
Dr. Montes recommending the following:     - testing for both hemochromatosis and alpha-1-antitrypsin deficiency   - vitamin D supplementation, ~ 2000 IU/day for month then can resume 800 IU/day.     Attempted to reach pt to update her of above, YESENIA left.

## 2018-07-18 NOTE — PROGRESS NOTES
Pt reported she is already taking vitamin D 1000IU 2x/day.   Above reviewed with Dr. Montes who recommended no change, continue current dose of vitamin D.     Pt verbalized understanding of recommendations including labs. Pt confirmed she will have labs at [facility], message sent to schedule lab appt per pt preference.

## 2018-08-02 DIAGNOSIS — K74.60 CIRRHOSIS OF LIVER (H): ICD-10-CM

## 2018-08-07 LAB — COPATH REPORT: NORMAL

## 2018-08-08 LAB
A1AT PHENOTYP SERPL-IMP: ABNORMAL
A1AT SERPL-MCNC: 67 MG/DL (ref 90–200)
A1AT SS SERPL-MCNC: NEGATIVE
A1AT SZ SERPL-MCNC: ABNORMAL
A1AT ZZ SERPL-MCNC: ABNORMAL
SPECIMEN SOURCE: ABNORMAL

## 2018-08-13 LAB
DLCOUNC-%PRED-PRE: 113 %
DLCOUNC-PRE: 23.95 ML/MIN/MMHG
DLCOUNC-PRED: 21.03 ML/MIN/MMHG
ERV-%PRED-PRE: 276 %
ERV-PRE: 0.69 L
ERV-PRED: 0.25 L
EXPTIME-PRE: 5.96 SEC
FEF2575-%PRED-PRE: 193 %
FEF2575-PRE: 4.38 L/SEC
FEF2575-PRED: 2.26 L/SEC
FEFMAX-%PRED-PRE: 114 %
FEFMAX-PRE: 6.98 L/SEC
FEFMAX-PRED: 6.09 L/SEC
FEV1-%PRED-PRE: 122 %
FEV1-PRE: 2.93 L
FEV1FEV6-PRE: 91 %
FEV1FEV6-PRED: 81 %
FEV1FVC-PRE: 91 %
FEV1FVC-PRED: 78 %
FEV1SVC-PRE: 86 %
FEV1SVC-PRED: 78 %
FIFMAX-PRE: 5.31 L/SEC
FRCPLETH-%PRED-PRE: 96 %
FRCPLETH-PRE: 2.49 L
FRCPLETH-PRED: 2.59 L
FVC-%PRED-PRE: 107 %
FVC-PRE: 3.22 L
FVC-PRED: 3 L
IC-%PRED-PRE: 97 %
IC-PRE: 2.73 L
IC-PRED: 2.81 L
MVV-%PRED-PRE: 118 %
MVV-PRE: 105 L/MIN
MVV-PRED: 89 L/MIN
RVPLETH-%PRED-PRE: 101 %
RVPLETH-PRE: 1.8 L
RVPLETH-PRED: 1.78 L
TLCPLETH-%PRED-PRE: 113 %
TLCPLETH-PRE: 5.22 L
TLCPLETH-PRED: 4.6 L
VA-%PRED-PRE: 97 %
VA-PRE: 4.61 L
VC-%PRED-PRE: 111 %
VC-PRE: 3.42 L
VC-PRED: 3.06 L

## 2018-08-20 ENCOUNTER — EXTERNAL ORDER RESULTS (OUTPATIENT)
Dept: TRANSPLANT | Facility: CLINIC | Age: 58
End: 2018-08-20

## 2018-08-20 ENCOUNTER — PRE VISIT (OUTPATIENT)
Dept: GASTROENTEROLOGY | Facility: CLINIC | Age: 58
End: 2018-08-20

## 2018-08-20 DIAGNOSIS — K74.60 CIRRHOSIS OF LIVER (H): Primary | ICD-10-CM

## 2018-08-20 NOTE — PROGRESS NOTES
Was the patient contacted by phone and reminded of the upcoming visit? message left    Was the patient instructed to bring a current list of all medications to the appointment or instructed to bring in all medication bottles? Yes     Were outside records requested? vm left that if she has been seen outside of  to hand carry records with to visit.    Was the patient instructed to arrive prior to the appointment time to have ordered labs drawn? Yes, vm left to call her coordinator Monae WALTERS RN to discuss if labs are needed as none are ordered and she does not need any per standing orders.     Were the needed lab orders placed? Message sent to Monae WALTERS RN to place orders if needed.    Patient instructed to arrive early for check-in    Melissa Agrawal CMA  8/20/2018 10:54 AM

## 2018-08-21 ENCOUNTER — COMMITTEE REVIEW (OUTPATIENT)
Dept: TRANSPLANT | Facility: CLINIC | Age: 58
End: 2018-08-21

## 2018-08-21 ENCOUNTER — OFFICE VISIT (OUTPATIENT)
Dept: GASTROENTEROLOGY | Facility: CLINIC | Age: 58
End: 2018-08-21
Attending: INTERNAL MEDICINE
Payer: COMMERCIAL

## 2018-08-21 ENCOUNTER — DOCUMENTATION ONLY (OUTPATIENT)
Dept: PHARMACY | Facility: CLINIC | Age: 58
End: 2018-08-21

## 2018-08-21 ENCOUNTER — TELEPHONE (OUTPATIENT)
Dept: TRANSPLANT | Facility: CLINIC | Age: 58
End: 2018-08-21

## 2018-08-21 VITALS
BODY MASS INDEX: 41.81 KG/M2 | DIASTOLIC BLOOD PRESSURE: 63 MMHG | WEIGHT: 227.2 LBS | HEART RATE: 72 BPM | HEIGHT: 62 IN | SYSTOLIC BLOOD PRESSURE: 96 MMHG | OXYGEN SATURATION: 99 % | RESPIRATION RATE: 16 BRPM

## 2018-08-21 DIAGNOSIS — Z76.82 LIVER TRANSPLANT CANDIDATE: Primary | ICD-10-CM

## 2018-08-21 DIAGNOSIS — K74.60 CIRRHOSIS OF LIVER (H): ICD-10-CM

## 2018-08-21 LAB
ALBUMIN SERPL-MCNC: 2.6 G/DL (ref 3.4–5)
ALP SERPL-CCNC: 118 U/L (ref 40–150)
ALT SERPL W P-5'-P-CCNC: 50 U/L (ref 0–50)
ANION GAP SERPL CALCULATED.3IONS-SCNC: 4 MMOL/L (ref 3–14)
AST SERPL W P-5'-P-CCNC: ABNORMAL U/L (ref 0–45)
BILIRUB DIRECT SERPL-MCNC: 1.4 MG/DL (ref 0–0.2)
BILIRUB SERPL-MCNC: 4.4 MG/DL (ref 0.2–1.3)
BUN SERPL-MCNC: 13 MG/DL (ref 7–30)
CALCIUM SERPL-MCNC: 9.1 MG/DL (ref 8.5–10.1)
CHLORIDE SERPL-SCNC: 103 MMOL/L (ref 94–109)
CO2 SERPL-SCNC: 30 MMOL/L (ref 20–32)
CREAT SERPL-MCNC: 0.59 MG/DL (ref 0.52–1.04)
ERYTHROCYTE [DISTWIDTH] IN BLOOD BY AUTOMATED COUNT: 13.5 % (ref 10–15)
GFR SERPL CREATININE-BSD FRML MDRD: >90 ML/MIN/1.7M2
GLUCOSE SERPL-MCNC: 75 MG/DL (ref 70–99)
HCT VFR BLD AUTO: 37.9 % (ref 35–47)
HGB BLD-MCNC: 13.1 G/DL (ref 11.7–15.7)
INR PPP: 1.76 (ref 0.86–1.14)
MCH RBC QN AUTO: 39 PG (ref 26.5–33)
MCHC RBC AUTO-ENTMCNC: 34.6 G/DL (ref 31.5–36.5)
MCV RBC AUTO: 113 FL (ref 78–100)
PLATELET # BLD AUTO: 62 10E9/L (ref 150–450)
POTASSIUM SERPL-SCNC: 4.8 MMOL/L (ref 3.4–5.3)
PROT SERPL-MCNC: 6.4 G/DL (ref 6.8–8.8)
RBC # BLD AUTO: 3.36 10E12/L (ref 3.8–5.2)
SODIUM SERPL-SCNC: 137 MMOL/L (ref 133–144)
WBC # BLD AUTO: 5.6 10E9/L (ref 4–11)

## 2018-08-21 PROCEDURE — 80048 BASIC METABOLIC PNL TOTAL CA: CPT | Performed by: INTERNAL MEDICINE

## 2018-08-21 PROCEDURE — G0463 HOSPITAL OUTPT CLINIC VISIT: HCPCS | Mod: ZF

## 2018-08-21 PROCEDURE — 80076 HEPATIC FUNCTION PANEL: CPT | Performed by: INTERNAL MEDICINE

## 2018-08-21 PROCEDURE — 85610 PROTHROMBIN TIME: CPT | Performed by: INTERNAL MEDICINE

## 2018-08-21 PROCEDURE — 36415 COLL VENOUS BLD VENIPUNCTURE: CPT | Performed by: INTERNAL MEDICINE

## 2018-08-21 PROCEDURE — 85027 COMPLETE CBC AUTOMATED: CPT | Performed by: INTERNAL MEDICINE

## 2018-08-21 RX ORDER — CHOLECALCIFEROL (VITAMIN D3) 25 MCG
1000 CAPSULE ORAL 2 TIMES DAILY
Status: ON HOLD | COMMUNITY
End: 2019-09-30

## 2018-08-21 ASSESSMENT — PAIN SCALES - GENERAL: PAINLEVEL: NO PAIN (0)

## 2018-08-21 NOTE — MR AVS SNAPSHOT
After Visit Summary   8/21/2018    Nicci Pemberton    MRN: [id number]           Patient Information     Date Of Birth          1960        Visit Information        Provider Department      8/21/2018 8:00 AM Meghan Montes MD [facility] Hepatology         Follow-ups after your visit        Your next 10 appointments already scheduled     Nov 20, 2018  7:00 AM CST   Lab with  LAB   [facility] Lab ([facility])    [address]  1st Floor  [place] [id number] [phone]            Nov 20, 2018  8:00 AM CST   (Arrive by 7:45 AM)   Return General Liver with Meghan Montes MD   [facility] Hepatology ([facility])    [address]  [address]  [place] [address] [phone]              Who to contact     If you have questions or need follow up information about today's clinic visit or your schedule please contact [facility] HEPATOLOGY directly at [phone].  Normal or non-critical lab and imaging results will be communicated to you by Lovejuicehart, letter or phone within 4 business days after the clinic has received the results. If you do not hear from us within 7 days, please contact the clinic through BitLeapt or phone. If you have a critical or abnormal lab result, we will notify you by phone as soon as possible.  Submit refill requests through [employer] or call your pharmacy and they will forward the refill request to us. Please allow 3 business days for your refill to be completed.          Additional Information About Your Visit        LovejuiceharAuditude Information     [employer] gives you secure access to your electronic health record. If you see a primary care provider, you can also send messages to your care team and make appointments. If you have questions, please call your primary care clinic.  If you do not have a primary care provider, please call [phone] and they will assist you.        Care EveryWhere ID     This is your Care  "EveryWhere ID. This could be used by other organizations to access your [place] medical records  [id number]        Your Vitals Were     Pulse Respirations Height Pulse Oximetry BMI (Body Mass Index)       72 16 1.575 m (5' 2\") 99% 41.56 kg/m2        Blood Pressure from Last 3 Encounters:   08/21/18 96/63   06/27/18 92/62   06/22/18 114/61    Weight from Last 3 Encounters:   08/21/18 103.1 kg (227 lb 3.2 oz)   06/27/18 101.9 kg (224 lb 9.6 oz)   06/22/18 97.5 kg (215 lb)              Today, you had the following     No orders found for display         Today's Medication Changes          These changes are accurate as of 8/21/18  8:59 AM.  If you have any questions, ask your nurse or doctor.               These medicines have changed or have updated prescriptions.        Dose/Directions    Vitamin D-3 1000 units Caps   This may have changed:  Another medication with the same name was removed. Continue taking this medication, and follow the directions you see here.   Changed by:  Meghan Montes MD        Dose:  1000 Units   Take 1,000 Units by mouth 2 times daily   Refills:  0                Primary Care Provider Office Phone # Fax #    Pedro Ricardo [phone]       [place] [address] [address] [facility] [address]        Equal Access to Services     BEATRIZ GONCALVES AH: Hadii alma fischer hadasho Soomaali, waaxda luqadaha, qaybta kaalmada adejennifer, kimi sanabria. So [facility] [phone].    ATENCIÓN: Si habla español, tiene a gaston disposición servicios gratuitos de asistencia lingüística. Ju al [phone].    We comply with applicable federal civil rights laws and [place] laws. We do not discriminate on the basis of race, color, national origin, age, disability, sex, sexual orientation, or gender identity.            Thank you!     Thank you for choosing [facility] HEPATOLOGY  for your care. Our goal is always to provide you with excellent care. Hearing back from our patients is one " way we can continue to improve our services. Please take a few minutes to complete the written survey that you may receive in the mail after your visit with us. Thank you!             Your Updated Medication List - Protect others around you: Learn how to safely use, store and throw away your medicines at www.disposemymeds.org.          This list is accurate as of 8/21/18  8:59 AM.  Always use your most recent med list.                   Brand Name Dispense Instructions for use Diagnosis    levothyroxine 125 MCG tablet    SYNTHROID/LEVOTHROID     Take 1 tab daily        magnesium oxide 400 MG tablet    MAG-OX     Take 400 mg by mouth daily        spironolactone 100 MG tablet    ALDACTONE     Take 1 tab twice daily        Vitamin D-3 1000 units Caps      Take 1,000 Units by mouth 2 times daily

## 2018-08-21 NOTE — LETTER
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    Remains decompensated, with meld sodium of 19, ABO: A  She is completed the evaluation for a liver transplant and appears to be a reasonable candidate if she wishes to proceed.  Discussed this in detail with the patient and her  and all their questions were answered, she would like to be considered for transplantation but still hoping she will never need it as she does not feel terribly sick.    Discussed the fact that her BMI would need to be less than 40, she is committed to working on weight loss and regular exercise.  She needs hepatitis B vaccine, tetanus shot.Ordered for today.  Otherwise no change to her management plan.    We will plan on seeing her back in clinic in 3 month or sooner as needed.    Meghan Montes MD  [facility] Transplant Hepatology clinic    -----------------------------------------------------       HPI:   Nicci Pemberton is a 58 year old female with cryptogenic cirrhosis presenting for follow-up.    Please refer to prior clinic note for details of initial presentation.   Since last clinic visit, she has been relatively stable without new problems.  She notes improvement in the nausea, less frequent.  Her bowel habits are more regular and denies constipation.  Continues to feel swelling of her legs, suspect approximately 5 pounds weight gain due to water retention.  Her energy is intact, continues to work full-time.    Otherwise, denies chest pain, shortness of breath, abdominal pain, nausea,  "vomiting fevers, chills, bleeding, jaundice, confusion, falls, rash, pruritis, or abdominal distention. Has stable appetite and bowel habits.         Past Medical History:     Past Medical History:   Diagnosis Date     Anemia      Cellulitis      Cirrhosis of liver (H) 6/18/2018     Lymphedema      Osteoarthritis             Past Surgical History:     Past Surgical History:   Procedure Laterality Date     COLONOSCOPY N/A 6/22/2018    Procedure: COLONOSCOPY;  colonoscopy;  Surgeon: Hugo Patel MD;  Location: [facility] OR            Medications:     Current Outpatient Prescriptions   Medication     Cholecalciferol (VITAMIN D-3) 1000 units CAPS     levothyroxine (SYNTHROID/LEVOTHROID) 125 MCG tablet     magnesium oxide (MAG-OX) 400 MG tablet     spironolactone (ALDACTONE) 100 MG tablet     No current facility-administered medications for this visit.             Allergies:     Allergies   Allergen Reactions     Food      Fruits with cores and pits     Sulfa Drugs Unknown     Swollen joints     Furosemide Rash            Social History:     Social History     Social History     Marital status:      Spouse name: N/A     Number of children: N/A     Years of education: N/A     Occupational History     Not on file.     Social History Main Topics     Smoking status: Former Smoker     Quit date: 2011     Smokeless tobacco: Never Used      Comment: weekend smoker      Alcohol use No     Drug use: No     Sexual activity: Not on file     Other Topics Concern     Not on file     Social History Narrative            Family History:            Review of Systems:   10 points ROS was obtained and highlighted in the HPI, otherwise negative.          Physical Exam:   VS:  BP 96/63 (BP Location: Right arm, Patient Position: Sitting, Cuff Size: Adult Large)  Pulse 72  Resp 16  Ht 1.575 m (5' 2\")  Wt 103.1 kg (227 lb 3.2 oz)  SpO2 99%  BMI 41.56 kg/m2      Gen: A&Ox3, NAD  HEENT: icteric, oropharynx clear  CV: RRR  Lung: " CTAB  Abd: soft, NT, ND, spleen is enlarged, liver is not palpable.   Ext: no edema, intact pulses.   Skin: stigmata of chronic liver disease.   Neuro: no focal deficits, grossly intact, no asterixis   Psych: appropriate mood and affects         Data:   Reviewed in person and significant for:  Lab Results   Component Value Date    WBC 5.6 08/21/2018     Lab Results   Component Value Date    RBC 3.36 08/21/2018     Lab Results   Component Value Date    HGB 13.1 08/21/2018     Lab Results   Component Value Date    HCT 37.9 08/21/2018     No components found for: MCT  Lab Results   Component Value Date     08/21/2018     Lab Results   Component Value Date    MCH 39.0 08/21/2018     Lab Results   Component Value Date    MCHC 34.6 08/21/2018     Lab Results   Component Value Date    RDW 13.5 08/21/2018     Lab Results   Component Value Date    PLT 62 08/21/2018     Last Comprehensive Metabolic Panel:  Sodium   Date Value Ref Range Status   08/21/2018 137 133 - 144 mmol/L Final     Potassium   Date Value Ref Range Status   08/21/2018 4.8 3.4 - 5.3 mmol/L Final     Comment:     Specimen slightly hemolyzed, potassium may be falsely elevated     Chloride   Date Value Ref Range Status   08/21/2018 103 94 - 109 mmol/L Final     Carbon Dioxide   Date Value Ref Range Status   08/21/2018 30 20 - 32 mmol/L Final     Anion Gap   Date Value Ref Range Status   08/21/2018 4 3 - 14 mmol/L Final     Glucose   Date Value Ref Range Status   08/21/2018 75 70 - 99 mg/dL Final     Urea Nitrogen   Date Value Ref Range Status   08/21/2018 13 7 - 30 mg/dL Final     Creatinine   Date Value Ref Range Status   08/21/2018 0.59 0.52 - 1.04 mg/dL Final     GFR Estimate   Date Value Ref Range Status   08/21/2018 >90 >60 mL/min/1.7m2 Final     Comment:     Non  GFR Calc     Calcium   Date Value Ref Range Status   08/21/2018 9.1 8.5 - 10.1 mg/dL Final     Liver Function Studies -   Recent Labs   Lab Test  08/21/18   0703    PROTTOTAL  6.4*   ALBUMIN  2.6*   BILITOTAL  4.4*   ALKPHOS  118   AST  Unsatisfactory specimen - hemolyzed   ALT  50       Meghan Montes MD

## 2018-08-21 NOTE — COMMITTEE REVIEW
Abdominal Committee Review Note     Evaluation Date: 4/30/2018  Committee Review Date: 8/21/2018    Organ being evaluated for: Liver    Transplant Phase: Evaluation  Transplant Status: Active    Transplant Coordinator: Monae Tapia  Transplant Surgeon:   Mandeep Alford    Referring Physician: Raymond Leija    Primary Diagnosis: Cirrhosis: Cryptogenic (Idiopathic)  Secondary Diagnosis:     Committee Review Members:  Nutrition Genet Zhang, RD   Pharmacy Shankar Gore, [facility]    - Clinical Tracy Almeida, ARIEL, Jane Hung, [facility]   Transplant Meghan Montes MD, Zamzam Erickson, RN, JESSE Reyes-C, Pedro Cruz MD, Jr Doug Torrez, ED, Nicci Sanders MD, Shawanda Oro, APRN CNP, Fior Pearce, RN, Nerissa Aranda MD, Burton Calixto MD, Monae Tapia, ED, Mandeep Alford MD, Selena Howard MD       Transplant Eligibility: Cirrhosis with MELD    Committee Review Decision: Approved    Relative Contraindications: None    Absolute Contraindications: None    Committee Chair Burton Montana MD verbally attested to the committee's decision.    Committee Discussion Details:     Approved for listing.  Pt should continue to work on weight loss.     Pt updated.   PA required for listing.

## 2018-08-21 NOTE — PHARMACY-MEDICATION REGIMEN REVIEW
Pharmacy Liver Pre-Transplant Medication Evaluation    This patient is a 58 year old female being considered for liver transplantation.   As part of the liver pre-transplant patient evaluation, pharmacy has screened this patient s electronic medical record for medication related concerns.     Assessment/Plan:  The following medication related issues may be of possible concern for this patient post transplant, based on the medical record medication list review:     Spironolactone: K+ sparing. Recommend considering other diuretic if one needed in environment of tacrolimus post transplant due to increased risk of hyperkalemia.      Pharmacy will continue to participate in this patient's care throughout the transplant course. Please contact pharmacy with any further medication related questions or concerns.    Shankar Gore R.Ph.  [facility]- Specialty Pharmacy  [phone] pager

## 2018-08-21 NOTE — NURSING NOTE
"Chief Complaint   Patient presents with     RECHECK     Cirrhosis       BP 96/63 (BP Location: Right arm, Patient Position: Sitting, Cuff Size: Adult Large)  Pulse 72  Resp 16  Ht 1.575 m (5' 2\")  Wt 103.1 kg (227 lb 3.2 oz)  SpO2 99%  BMI 41.56 kg/m2    Melissa Agrawal [facility]  8/21/2018 7:51 AM      "

## 2018-08-21 NOTE — TELEPHONE ENCOUNTER
DATE:  8/21/2018   TIME OF RECEIPT FROM LAB:  7:40am  LAB TEST:  AST  LAB VALUE:  84  RESULTS GIVEN WITH READ-BACK TO (PROVIDER):  Fior Leon  TIME LAB VALUE REPORTED TO PROVIDER:   7:55am

## 2018-08-21 NOTE — LETTER
8/21/2018       RE: Nicci Pemberton  [address] [facility] [address]     Dear Colleague,    Thank you for referring your patient, Nicci Pemberton, to the [facility] HEPATOLOGY at [facility]. Please see a copy of my visit note below.    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    Remains decompensated, with meld sodium of 19, ABO: A  She is completed the evaluation for a liver transplant and appears to be a reasonable candidate if she wishes to proceed.  Discussed this in detail with the patient and her  and all their questions were answered, she would like to be considered for transplantation but still hoping she will never need it as she does not feel terribly sick.    Discussed the fact that her BMI would need to be less than 40, she is committed to working on weight loss and regular exercise.  She needs hepatitis B vaccine, tetanus shot.Ordered for today.  Otherwise no change to her management plan.    We will plan on seeing her back in clinic in 3 month or sooner as needed.    Meghan Montes MD  [facility] Transplant Hepatology clinic    -----------------------------------------------------       HPI:   Nicci Pemberton is a 58 year old female with cryptogenic cirrhosis presenting for follow-up.    Please refer to prior clinic note for details of initial presentation.   Since last clinic visit, she has been relatively stable without new problems.  She notes improvement in the nausea, less frequent.  Her bowel habits are more regular and denies constipation.  Continues to feel swelling of her legs,  suspect approximately 5 pounds weight gain due to water retention.  Her energy is intact, continues to work full-time.    Otherwise, denies chest pain, shortness of breath, abdominal pain, nausea, vomiting fevers, chills, bleeding, jaundice, confusion, falls, rash, pruritis, or abdominal distention. Has stable appetite and bowel habits.         Past Medical History:     Past Medical History:   Diagnosis Date     Anemia      Cellulitis      Cirrhosis of liver (H) 6/18/2018     Lymphedema      Osteoarthritis             Past Surgical History:     Past Surgical History:   Procedure Laterality Date     COLONOSCOPY N/A 6/22/2018    Procedure: COLONOSCOPY;  colonoscopy;  Surgeon: Hugo Patel MD;  Location:  OR            Medications:     Current Outpatient Prescriptions   Medication     Cholecalciferol (VITAMIN D-3) 1000 units CAPS     levothyroxine (SYNTHROID/LEVOTHROID) 125 MCG tablet     magnesium oxide (MAG-OX) 400 MG tablet     spironolactone (ALDACTONE) 100 MG tablet     No current facility-administered medications for this visit.             Allergies:     Allergies   Allergen Reactions     Food      Fruits with cores and pits     Sulfa Drugs Unknown     Swollen joints     Furosemide Rash            Social History:     Social History     Social History     Marital status:      Spouse name: N/A     Number of children: N/A     Years of education: N/A     Occupational History     Not on file.     Social History Main Topics     Smoking status: Former Smoker     Quit date: 2011     Smokeless tobacco: Never Used      Comment: weekend smoker      Alcohol use No     Drug use: No     Sexual activity: Not on file     Other Topics Concern     Not on file     Social History Narrative            Family History:            Review of Systems:   10 points ROS was obtained and highlighted in the HPI, otherwise negative.          Physical Exam:   VS:  BP 96/63 (BP Location: Right arm, Patient Position: Sitting, Cuff  "Size: Adult Large)  Pulse 72  Resp 16  Ht 1.575 m (5' 2\")  Wt 103.1 kg (227 lb 3.2 oz)  SpO2 99%  BMI 41.56 kg/m2      Gen: A&Ox3, NAD  HEENT: icteric, oropharynx clear  CV: RRR  Lung: CTAB  Abd: soft, NT, ND, spleen is enlarged, liver is not palpable.   Ext: no edema, intact pulses.   Skin: stigmata of chronic liver disease.   Neuro: no focal deficits, grossly intact, no asterixis   Psych: appropriate mood and affects         Data:   Reviewed in person and significant for:  Lab Results   Component Value Date    WBC 5.6 08/21/2018     Lab Results   Component Value Date    RBC 3.36 08/21/2018     Lab Results   Component Value Date    HGB 13.1 08/21/2018     Lab Results   Component Value Date    HCT 37.9 08/21/2018     No components found for: MCT  Lab Results   Component Value Date     08/21/2018     Lab Results   Component Value Date    MCH 39.0 08/21/2018     Lab Results   Component Value Date    MCHC 34.6 08/21/2018     Lab Results   Component Value Date    RDW 13.5 08/21/2018     Lab Results   Component Value Date    PLT 62 08/21/2018     Last Comprehensive Metabolic Panel:  Sodium   Date Value Ref Range Status   08/21/2018 137 133 - 144 mmol/L Final     Potassium   Date Value Ref Range Status   08/21/2018 4.8 3.4 - 5.3 mmol/L Final     Comment:     Specimen slightly hemolyzed, potassium may be falsely elevated     Chloride   Date Value Ref Range Status   08/21/2018 103 94 - 109 mmol/L Final     Carbon Dioxide   Date Value Ref Range Status   08/21/2018 30 20 - 32 mmol/L Final     Anion Gap   Date Value Ref Range Status   08/21/2018 4 3 - 14 mmol/L Final     Glucose   Date Value Ref Range Status   08/21/2018 75 70 - 99 mg/dL Final     Urea Nitrogen   Date Value Ref Range Status   08/21/2018 13 7 - 30 mg/dL Final     Creatinine   Date Value Ref Range Status   08/21/2018 0.59 0.52 - 1.04 mg/dL Final     GFR Estimate   Date Value Ref Range Status   08/21/2018 >90 >60 mL/min/1.7m2 Final     Comment:     " Non  GFR Calc     Calcium   Date Value Ref Range Status   08/21/2018 9.1 8.5 - 10.1 mg/dL Final     Liver Function Studies -   Recent Labs   Lab Test  08/21/18   0703   PROTTOTAL  6.4*   ALBUMIN  2.6*   BILITOTAL  4.4*   ALKPHOS  118   AST  Unsatisfactory specimen - hemolyzed   ALT  50       Again, thank you for allowing me to participate in the care of your patient.      Sincerely,    Meghan Montes MD

## 2018-08-21 NOTE — PROGRESS NOTES
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    Remains decompensated, with meld sodium of 19, ABO: A  She is completed the evaluation for a liver transplant and appears to be a reasonable candidate if she wishes to proceed.  Discussed this in detail with the patient and her  and all their questions were answered, she would like to be considered for transplantation but still hoping she will never need it as she does not feel terribly sick.    Discussed the fact that her BMI would need to be less than 40, she is committed to working on weight loss and regular exercise.  She needs hepatitis B vaccine, tetanus shot.Ordered for today.  Otherwise no change to her management plan.    We will plan on seeing her back in clinic in 3 month or sooner as needed.    Meghan Montes MD  [facility] Transplant Hepatology clinic    -----------------------------------------------------       HPI:   Nicci Pemberton is a 58 year old female with cryptogenic cirrhosis presenting for follow-up.    Please refer to prior clinic note for details of initial presentation.   Since last clinic visit, she has been relatively stable without new problems.  She notes improvement in the nausea, less frequent.  Her bowel habits are more regular and denies constipation.  Continues to feel swelling of her legs, suspect approximately 5 pounds weight gain due to water retention.  Her energy is intact, continues to work full-time.    Otherwise, denies chest pain, shortness of breath, abdominal pain, nausea, vomiting fevers, chills, bleeding, jaundice, confusion, falls, rash, pruritis, or  "abdominal distention. Has stable appetite and bowel habits.         Past Medical History:     Past Medical History:   Diagnosis Date     Anemia      Cellulitis      Cirrhosis of liver (H) 6/18/2018     Lymphedema      Osteoarthritis             Past Surgical History:     Past Surgical History:   Procedure Laterality Date     COLONOSCOPY N/A 6/22/2018    Procedure: COLONOSCOPY;  colonoscopy;  Surgeon: Hugo Patel MD;  Location:  OR            Medications:     Current Outpatient Prescriptions   Medication     Cholecalciferol (VITAMIN D-3) 1000 units CAPS     levothyroxine (SYNTHROID/LEVOTHROID) 125 MCG tablet     magnesium oxide (MAG-OX) 400 MG tablet     spironolactone (ALDACTONE) 100 MG tablet     No current facility-administered medications for this visit.             Allergies:     Allergies   Allergen Reactions     Food      Fruits with cores and pits     Sulfa Drugs Unknown     Swollen joints     Furosemide Rash            Social History:     Social History     Social History     Marital status:      Spouse name: N/A     Number of children: N/A     Years of education: N/A     Occupational History     Not on file.     Social History Main Topics     Smoking status: Former Smoker     Quit date: 2011     Smokeless tobacco: Never Used      Comment: weekend smoker      Alcohol use No     Drug use: No     Sexual activity: Not on file     Other Topics Concern     Not on file     Social History Narrative            Family History:            Review of Systems:   10 points ROS was obtained and highlighted in the HPI, otherwise negative.          Physical Exam:   VS:  BP 96/63 (BP Location: Right arm, Patient Position: Sitting, Cuff Size: Adult Large)  Pulse 72  Resp 16  Ht 1.575 m (5' 2\")  Wt 103.1 kg (227 lb 3.2 oz)  SpO2 99%  BMI 41.56 kg/m2      Gen: A&Ox3, NAD  HEENT: icteric, oropharynx clear  CV: RRR  Lung: CTAB  Abd: soft, NT, ND, spleen is enlarged, liver is not palpable.   Ext: no edema, " intact pulses.   Skin: stigmata of chronic liver disease.   Neuro: no focal deficits, grossly intact, no asterixis   Psych: appropriate mood and affects         Data:   Reviewed in person and significant for:  Lab Results   Component Value Date    WBC 5.6 08/21/2018     Lab Results   Component Value Date    RBC 3.36 08/21/2018     Lab Results   Component Value Date    HGB 13.1 08/21/2018     Lab Results   Component Value Date    HCT 37.9 08/21/2018     No components found for: MCT  Lab Results   Component Value Date     08/21/2018     Lab Results   Component Value Date    MCH 39.0 08/21/2018     Lab Results   Component Value Date    MCHC 34.6 08/21/2018     Lab Results   Component Value Date    RDW 13.5 08/21/2018     Lab Results   Component Value Date    PLT 62 08/21/2018     Last Comprehensive Metabolic Panel:  Sodium   Date Value Ref Range Status   08/21/2018 137 133 - 144 mmol/L Final     Potassium   Date Value Ref Range Status   08/21/2018 4.8 3.4 - 5.3 mmol/L Final     Comment:     Specimen slightly hemolyzed, potassium may be falsely elevated     Chloride   Date Value Ref Range Status   08/21/2018 103 94 - 109 mmol/L Final     Carbon Dioxide   Date Value Ref Range Status   08/21/2018 30 20 - 32 mmol/L Final     Anion Gap   Date Value Ref Range Status   08/21/2018 4 3 - 14 mmol/L Final     Glucose   Date Value Ref Range Status   08/21/2018 75 70 - 99 mg/dL Final     Urea Nitrogen   Date Value Ref Range Status   08/21/2018 13 7 - 30 mg/dL Final     Creatinine   Date Value Ref Range Status   08/21/2018 0.59 0.52 - 1.04 mg/dL Final     GFR Estimate   Date Value Ref Range Status   08/21/2018 >90 >60 mL/min/1.7m2 Final     Comment:     Non  GFR Calc     Calcium   Date Value Ref Range Status   08/21/2018 9.1 8.5 - 10.1 mg/dL Final     Liver Function Studies -   Recent Labs   Lab Test  08/21/18   0703   PROTTOTAL  6.4*   ALBUMIN  2.6*   BILITOTAL  4.4*   ALKPHOS  118   AST  Unsatisfactory  specimen - hemolyzed   ALT  50

## 2018-08-28 ENCOUNTER — DOCUMENTATION ONLY (OUTPATIENT)
Dept: TRANSPLANT | Facility: CLINIC | Age: 58
End: 2018-08-28

## 2018-08-28 DIAGNOSIS — K74.60 CIRRHOSIS OF LIVER (H): Primary | ICD-10-CM

## 2018-08-29 ENCOUNTER — DOCUMENTATION ONLY (OUTPATIENT)
Dept: PHARMACY | Facility: CLINIC | Age: 58
End: 2018-08-29

## 2018-08-29 NOTE — PROGRESS NOTES
Pt updated of listing.   Plan established for pt to have MELD labs drawn at [facility], orders placed.   Pt denied questions or concerns.

## 2018-08-29 NOTE — PHARMACY-MEDICATION REGIMEN REVIEW
Pharmacy Liver Pre-Transplant Medication Evaluation    This patient is a 58 year old female being considered for liver transplantation.   As part of the liver pre-transplant patient evaluation, pharmacy has screened this patient s electronic medical record for medication related concerns.     Assessment/Plan:  The following medication related issues may be of possible concern for this patient post transplant, based on the medical record medication list review:     Spironolactone:  K+ sparing, Increased risk of hyperkalemia if used with tacrolimus. Consider alternative diuretic if needed post transplant.     Pharmacy will continue to participate in this patient's care throughout the transplant course. Please contact pharmacy with any further medication related questions or concerns.    Shankar Gore, R.Ph.  [facility]- Specialty Pharmacy  [phone] pager

## 2018-09-25 DIAGNOSIS — K74.60 CIRRHOSIS OF LIVER (H): ICD-10-CM

## 2018-09-25 LAB
ALBUMIN SERPL-MCNC: 2.4 G/DL (ref 3.4–5)
BILIRUB SERPL-MCNC: 3.5 MG/DL (ref 0.2–1.3)
CREAT SERPL-MCNC: 0.68 MG/DL (ref 0.52–1.04)
GFR SERPL CREATININE-BSD FRML MDRD: 88 ML/MIN/1.7M2
INR PPP: 1.88 (ref 0.86–1.14)
SODIUM SERPL-SCNC: 135 MMOL/L (ref 133–144)

## 2018-10-08 ENCOUNTER — TELEPHONE (OUTPATIENT)
Dept: TRANSPLANT | Facility: CLINIC | Age: 58
End: 2018-10-08

## 2018-10-25 DIAGNOSIS — K74.60 CIRRHOSIS OF LIVER (H): ICD-10-CM

## 2018-10-25 LAB
ALBUMIN SERPL-MCNC: 2.4 G/DL (ref 3.4–5)
BILIRUB SERPL-MCNC: 4.3 MG/DL (ref 0.2–1.3)
CREAT SERPL-MCNC: 0.67 MG/DL (ref 0.52–1.04)
GFR SERPL CREATININE-BSD FRML MDRD: 90 ML/MIN/1.7M2
INR PPP: 1.81 (ref 0.86–1.14)
SODIUM SERPL-SCNC: 138 MMOL/L (ref 133–144)

## 2018-11-09 ENCOUNTER — TRANSFERRED RECORDS (OUTPATIENT)
Dept: HEALTH INFORMATION MANAGEMENT | Facility: CLINIC | Age: 58
End: 2018-11-09

## 2018-11-09 DIAGNOSIS — K74.60 CIRRHOSIS OF LIVER WITHOUT ASCITES, UNSPECIFIED HEPATIC CIRRHOSIS TYPE (H): Primary | ICD-10-CM

## 2018-11-09 LAB — PAP SMEAR - HIM PATIENT REPORTED: NEGATIVE

## 2018-11-20 ENCOUNTER — OFFICE VISIT (OUTPATIENT)
Dept: GASTROENTEROLOGY | Facility: CLINIC | Age: 58
End: 2018-11-20
Attending: INTERNAL MEDICINE
Payer: COMMERCIAL

## 2018-11-20 VITALS
OXYGEN SATURATION: 99 % | SYSTOLIC BLOOD PRESSURE: 107 MMHG | WEIGHT: 232.4 LBS | DIASTOLIC BLOOD PRESSURE: 70 MMHG | TEMPERATURE: 97.9 F | HEART RATE: 85 BPM | BODY MASS INDEX: 42.76 KG/M2 | HEIGHT: 62 IN

## 2018-11-20 DIAGNOSIS — Z76.82 LIVER TRANSPLANT CANDIDATE: Primary | ICD-10-CM

## 2018-11-20 DIAGNOSIS — K74.60 CIRRHOSIS OF LIVER WITHOUT ASCITES, UNSPECIFIED HEPATIC CIRRHOSIS TYPE (H): ICD-10-CM

## 2018-11-20 LAB
ALBUMIN SERPL-MCNC: 2.5 G/DL (ref 3.4–5)
ALP SERPL-CCNC: 124 U/L (ref 40–150)
ALT SERPL W P-5'-P-CCNC: 50 U/L (ref 0–50)
ANION GAP SERPL CALCULATED.3IONS-SCNC: 7 MMOL/L (ref 3–14)
AST SERPL W P-5'-P-CCNC: 83 U/L (ref 0–45)
BILIRUB DIRECT SERPL-MCNC: 2.1 MG/DL (ref 0–0.2)
BILIRUB SERPL-MCNC: 5.6 MG/DL (ref 0.2–1.3)
BUN SERPL-MCNC: 12 MG/DL (ref 7–30)
CALCIUM SERPL-MCNC: 9 MG/DL (ref 8.5–10.1)
CHLORIDE SERPL-SCNC: 104 MMOL/L (ref 94–109)
CO2 SERPL-SCNC: 28 MMOL/L (ref 20–32)
CREAT SERPL-MCNC: 0.6 MG/DL (ref 0.52–1.04)
ERYTHROCYTE [DISTWIDTH] IN BLOOD BY AUTOMATED COUNT: 13.6 % (ref 10–15)
GFR SERPL CREATININE-BSD FRML MDRD: >90 ML/MIN/1.7M2
GLUCOSE SERPL-MCNC: 66 MG/DL (ref 70–99)
HCT VFR BLD AUTO: 38.6 % (ref 35–47)
HGB BLD-MCNC: 13.2 G/DL (ref 11.7–15.7)
INR PPP: 1.83 (ref 0.86–1.14)
MCH RBC QN AUTO: 38.3 PG (ref 26.5–33)
MCHC RBC AUTO-ENTMCNC: 34.2 G/DL (ref 31.5–36.5)
MCV RBC AUTO: 112 FL (ref 78–100)
PLATELET # BLD AUTO: 60 10E9/L (ref 150–450)
POTASSIUM SERPL-SCNC: 4.1 MMOL/L (ref 3.4–5.3)
PROT SERPL-MCNC: 6.3 G/DL (ref 6.8–8.8)
RBC # BLD AUTO: 3.45 10E12/L (ref 3.8–5.2)
SODIUM SERPL-SCNC: 139 MMOL/L (ref 133–144)
WBC # BLD AUTO: 4.3 10E9/L (ref 4–11)

## 2018-11-20 PROCEDURE — 80048 BASIC METABOLIC PNL TOTAL CA: CPT | Performed by: INTERNAL MEDICINE

## 2018-11-20 PROCEDURE — 80076 HEPATIC FUNCTION PANEL: CPT | Performed by: INTERNAL MEDICINE

## 2018-11-20 PROCEDURE — 85610 PROTHROMBIN TIME: CPT | Performed by: INTERNAL MEDICINE

## 2018-11-20 PROCEDURE — 85027 COMPLETE CBC AUTOMATED: CPT | Performed by: INTERNAL MEDICINE

## 2018-11-20 PROCEDURE — 36415 COLL VENOUS BLD VENIPUNCTURE: CPT | Performed by: INTERNAL MEDICINE

## 2018-11-20 PROCEDURE — G0463 HOSPITAL OUTPT CLINIC VISIT: HCPCS | Mod: ZF

## 2018-11-20 ASSESSMENT — PAIN SCALES - GENERAL: PAINLEVEL: NO PAIN (0)

## 2018-11-20 NOTE — LETTER
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     Currently, listed for liver transplantation, presents with her  for follow up.     Decompensated, MELD-Na 20, ABO: A  It's encouraging she continues to be active and is performing appropriately at work. Discussed how this is great, but she should not push herself too much.      - continues to be on the transplant list and appear as a reasonably good candidate.   - encouraged them to continue thinking about living donation, no obvious candidates now.   - reviewed methods to reduce her weight in light of BMI that's ~42. She's agreeable and motivated to make some changes.   Otherwise no change to her management regimen.    RTC in 3 months     Meghan Montes MD  [facility] Transplant Hepatology clinic    -----------------------------------------------------       HPI:   Ncici Pemberton is a 58 year old female with cryptogenic cirrhosis, on the liver transplantation list for decompensated disease, presents for follow up.     Please refer to prior clinic note for details of initial presentation.   Although relatively stable, she struggles daily with low energy, and reduced stamina.     Still able to work but it's not as easy as it once was.  Otherwise, denies chest pain, shortness of breath, abdominal pain, nausea, vomiting fevers, chills, bleeding, confusion, falls, rash, pruritis, leg swelling or abdominal distention. Has stable weight, appetite and bowel habits.         Past Medical History:     Past Medical History:  "  Diagnosis Date     Anemia      Cellulitis      Cirrhosis of liver (H) 6/18/2018     Lymphedema      Osteoarthritis             Past Surgical History:     Past Surgical History:   Procedure Laterality Date     COLONOSCOPY N/A 6/22/2018    Procedure: COLONOSCOPY;  colonoscopy;  Surgeon: Hugo Patel MD;  Location:  OR            Medications:     Current Outpatient Prescriptions   Medication     Cholecalciferol (VITAMIN D-3) 1000 units CAPS     levothyroxine (SYNTHROID/LEVOTHROID) 125 MCG tablet     magnesium oxide (MAG-OX) 400 MG tablet     spironolactone (ALDACTONE) 100 MG tablet     No current facility-administered medications for this visit.             Allergies:     Allergies   Allergen Reactions     Food      Fruits with cores and pits     Sulfa Drugs Unknown     Swollen joints     Furosemide Rash            Social History:     Social History     Social History     Marital status:      Spouse name: N/A     Number of children: N/A     Years of education: N/A     Occupational History     Not on file.     Social History Main Topics     Smoking status: Former Smoker     Quit date: 2011     Smokeless tobacco: Never Used      Comment: weekend smoker      Alcohol use No     Drug use: No     Sexual activity: Not on file     Other Topics Concern     Not on file     Social History Narrative            Family History:   Negative for chronic liver disease.          Review of Systems:   10 points ROS was obtained and highlighted in the HPI, otherwise negative.          Physical Exam:   VS:  /70  Pulse 85  Temp 97.9  F (36.6  C) (Oral)  Ht 1.575 m (5' 2\")  Wt 105.4 kg (232 lb 6.4 oz)  SpO2 99%  BMI 42.51 kg/m2      Gen: A&Ox3, NAD  HEENT: icteric, oropharynx clear  CV: RRR  Lung: CTAB  Abd: soft, NT, ND,  spleen is enlarged, liver is not palpable.   Ext: no edema, intact pulses.   Skin: stigmata of chronic liver disease.   Neuro: no focal deficits, grossly intact, no asterixis   Psych: appropriate " mood and affects         Data:   Reviewed in person and significant for:  Lab Results   Component Value Date    WBC 4.3 11/20/2018     Lab Results   Component Value Date    RBC 3.45 11/20/2018     Lab Results   Component Value Date    HGB 13.2 11/20/2018     Lab Results   Component Value Date    HCT 38.6 11/20/2018     No components found for: MCT  Lab Results   Component Value Date     11/20/2018     Lab Results   Component Value Date    MCH 38.3 11/20/2018     Lab Results   Component Value Date    MCHC 34.2 11/20/2018     Lab Results   Component Value Date    RDW 13.6 11/20/2018     Lab Results   Component Value Date    PLT 60 11/20/2018     Last Comprehensive Metabolic Panel:  Sodium   Date Value Ref Range Status   11/20/2018 139 133 - 144 mmol/L Final     Potassium   Date Value Ref Range Status   11/20/2018 4.1 3.4 - 5.3 mmol/L Final     Chloride   Date Value Ref Range Status   11/20/2018 104 94 - 109 mmol/L Final     Carbon Dioxide   Date Value Ref Range Status   11/20/2018 28 20 - 32 mmol/L Final     Anion Gap   Date Value Ref Range Status   11/20/2018 7 3 - 14 mmol/L Final     Glucose   Date Value Ref Range Status   11/20/2018 66 (L) 70 - 99 mg/dL Final     Urea Nitrogen   Date Value Ref Range Status   11/20/2018 12 7 - 30 mg/dL Final     Creatinine   Date Value Ref Range Status   11/20/2018 0.60 0.52 - 1.04 mg/dL Final     GFR Estimate   Date Value Ref Range Status   11/20/2018 >90 >60 mL/min/1.7m2 Final     Comment:     Non  GFR Calc     Calcium   Date Value Ref Range Status   11/20/2018 9.0 8.5 - 10.1 mg/dL Final     Liver Function Studies -   Recent Labs   Lab Test  11/20/18   0701   PROTTOTAL  6.3*   ALBUMIN  2.5*   BILITOTAL  5.6*   ALKPHOS  124   AST  83*   ALT  50       Meghan Montes MD

## 2018-11-20 NOTE — PROGRESS NOTES
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     Currently, listed for liver transplantation, presents with her  for follow up.     Decompensated, MELD-Na 20, ABO: A  It's encouraging she continues to be active and is performing appropriately at work. Discussed how this is great, but she should not push herself too much.      - continues to be on the transplant list and appear as a reasonably good candidate.   - encouraged them to continue thinking about living donation, no obvious candidates now.   - reviewed methods to reduce her weight in light of BMI that's ~42. She's agreeable and motivated to make some changes.   Otherwise no change to her management regimen.    RTC in 3 months     Meghan Montes MD  [facility] Transplant Hepatology clinic    -----------------------------------------------------       HPI:   Nicci Pemberton is a 58 year old female with cryptogenic cirrhosis, on the liver transplantation list for decompensated disease, presents for follow up.     Please refer to prior clinic note for details of initial presentation.   Although relatively stable, she struggles daily with low energy, and reduced stamina.     Still able to work but it's not as easy as it once was.  Otherwise, denies chest pain, shortness of breath, abdominal pain, nausea, vomiting fevers, chills, bleeding, confusion, falls, rash, pruritis, leg swelling or abdominal distention. Has stable weight, appetite and bowel habits.         Past Medical History:     Past Medical History:   Diagnosis Date     Anemia      Cellulitis      Cirrhosis of liver (H) 6/18/2018      "Lymphedema      Osteoarthritis             Past Surgical History:     Past Surgical History:   Procedure Laterality Date     COLONOSCOPY N/A 6/22/2018    Procedure: COLONOSCOPY;  colonoscopy;  Surgeon: Hugo Patel MD;  Location:  OR            Medications:     Current Outpatient Prescriptions   Medication     Cholecalciferol (VITAMIN D-3) 1000 units CAPS     levothyroxine (SYNTHROID/LEVOTHROID) 125 MCG tablet     magnesium oxide (MAG-OX) 400 MG tablet     spironolactone (ALDACTONE) 100 MG tablet     No current facility-administered medications for this visit.             Allergies:     Allergies   Allergen Reactions     Food      Fruits with cores and pits     Sulfa Drugs Unknown     Swollen joints     Furosemide Rash            Social History:     Social History     Social History     Marital status:      Spouse name: N/A     Number of children: N/A     Years of education: N/A     Occupational History     Not on file.     Social History Main Topics     Smoking status: Former Smoker     Quit date: 2011     Smokeless tobacco: Never Used      Comment: weekend smoker      Alcohol use No     Drug use: No     Sexual activity: Not on file     Other Topics Concern     Not on file     Social History Narrative            Family History:   Negative for chronic liver disease.          Review of Systems:   10 points ROS was obtained and highlighted in the HPI, otherwise negative.          Physical Exam:   VS:  /70  Pulse 85  Temp 97.9  F (36.6  C) (Oral)  Ht 1.575 m (5' 2\")  Wt 105.4 kg (232 lb 6.4 oz)  SpO2 99%  BMI 42.51 kg/m2      Gen: A&Ox3, NAD  HEENT: icteric, oropharynx clear  CV: RRR  Lung: CTAB  Abd: soft, NT, ND,  spleen is enlarged, liver is not palpable.   Ext: no edema, intact pulses.   Skin: stigmata of chronic liver disease.   Neuro: no focal deficits, grossly intact, no asterixis   Psych: appropriate mood and affects         Data:   Reviewed in person and significant for:  Lab Results "   Component Value Date    WBC 4.3 11/20/2018     Lab Results   Component Value Date    RBC 3.45 11/20/2018     Lab Results   Component Value Date    HGB 13.2 11/20/2018     Lab Results   Component Value Date    HCT 38.6 11/20/2018     No components found for: MCT  Lab Results   Component Value Date     11/20/2018     Lab Results   Component Value Date    MCH 38.3 11/20/2018     Lab Results   Component Value Date    MCHC 34.2 11/20/2018     Lab Results   Component Value Date    RDW 13.6 11/20/2018     Lab Results   Component Value Date    PLT 60 11/20/2018     Last Comprehensive Metabolic Panel:  Sodium   Date Value Ref Range Status   11/20/2018 139 133 - 144 mmol/L Final     Potassium   Date Value Ref Range Status   11/20/2018 4.1 3.4 - 5.3 mmol/L Final     Chloride   Date Value Ref Range Status   11/20/2018 104 94 - 109 mmol/L Final     Carbon Dioxide   Date Value Ref Range Status   11/20/2018 28 20 - 32 mmol/L Final     Anion Gap   Date Value Ref Range Status   11/20/2018 7 3 - 14 mmol/L Final     Glucose   Date Value Ref Range Status   11/20/2018 66 (L) 70 - 99 mg/dL Final     Urea Nitrogen   Date Value Ref Range Status   11/20/2018 12 7 - 30 mg/dL Final     Creatinine   Date Value Ref Range Status   11/20/2018 0.60 0.52 - 1.04 mg/dL Final     GFR Estimate   Date Value Ref Range Status   11/20/2018 >90 >60 mL/min/1.7m2 Final     Comment:     Non  GFR Calc     Calcium   Date Value Ref Range Status   11/20/2018 9.0 8.5 - 10.1 mg/dL Final     Liver Function Studies -   Recent Labs   Lab Test  11/20/18   0701   PROTTOTAL  6.3*   ALBUMIN  2.5*   BILITOTAL  5.6*   ALKPHOS  124   AST  83*   ALT  50

## 2018-11-20 NOTE — MR AVS SNAPSHOT
After Visit Summary   11/20/2018    Nicci Pemberton    MRN: [id number]           Patient Information     Date Of Birth          1960        Visit Information        Provider Department      11/20/2018 8:00 AM Meghan Montes MD [facility] Hepatology         Follow-ups after your visit        Your next 10 appointments already scheduled     Feb 19, 2019  8:00 AM CST   Lab with  LAB   [facility] ([facility])    [address]  [address] [facility] [address] [phone]              Who to contact     If you have questions or need follow up information about today's clinic visit or your schedule please contact [facility] HEPATOLOGY directly at [phone].  Normal or non-critical lab and imaging results will be communicated to you by N[employer]hart, letter or phone within 4 business days after the clinic has received the results. If you do not hear from us within 7 days, please contact the clinic through N[employer]hart or phone. If you have a critical or abnormal lab result, we will notify you by phone as soon as possible.  Submit refill requests through [employer] or call your pharmacy and they will forward the refill request to us. Please allow 3 business days for your refill to be completed.          Additional Information About Your Visit        N[employer] Information     [employer] gives you secure access to your electronic health record. If you see a primary care provider, you can also send messages to your care team and make appointments. If you have questions, please call your primary care clinic.  If you do not have a primary care provider, please call [phone] and they will assist you.        Care EveryWhere ID     This is your Care EveryWhere ID. This could be used by other organizations to access your [facility] medical records  [id number]        Your Vitals Were     Pulse Temperature Height Pulse Oximetry BMI (Body Mass Index)       85 97.9  F (36.6  C) (Oral) 1.575 m  "(5' 2\") 99% 42.51 kg/m2        Blood Pressure from Last 3 Encounters:   11/20/18 107/70   08/21/18 96/63   06/27/18 92/62    Weight from Last 3 Encounters:   11/20/18 105.4 kg (232 lb 6.4 oz)   08/21/18 103.1 kg (227 lb 3.2 oz)   06/27/18 101.9 kg (224 lb 9.6 oz)              Today, you had the following     No orders found for display       Primary Care Provider Office Phone # Fax #    Pedro Ricardo [phone]       [place] [address] [place] [address]        Equal Access to Services     BEATRIZ GONCALVES : Drew Monge, fermin faustin, qalinda kaalmameme phipps, kimi sanabria. So [facility] [phone].    ATENCIÓN: Si habla español, tiene a gaston disposición servicios gratuitos de asistencia lingüística. Llame al [phone].    We comply with applicable federal civil rights laws and [place] laws. We do not discriminate on the basis of race, color, national origin, age, disability, sex, sexual orientation, or gender identity.            Thank you!     Thank you for choosing [facility] HEPATOLOGY  for your care. Our goal is always to provide you with excellent care. Hearing back from our patients is one way we can continue to improve our services. Please take a few minutes to complete the written survey that you may receive in the mail after your visit with us. Thank you!             Your Updated Medication List - Protect others around you: Learn how to safely use, store and throw away your medicines at www.disposemymeds.org.          This list is accurate as of 11/20/18  8:55 AM.  Always use your most recent med list.                   Brand Name Dispense Instructions for use Diagnosis    diclofenac 1 % Gel topical gel    VOLTAREN          levothyroxine 125 MCG tablet    SYNTHROID/LEVOTHROID     Take 1 tab daily        magnesium oxide 400 MG tablet    MAG-OX     Take 400 mg by mouth daily        spironolactone 100 MG tablet    ALDACTONE     " Take 1 tab twice daily        Vitamin D-3 1000 units Caps      Take 1,000 Units by mouth 2 times daily

## 2018-11-20 NOTE — NURSING NOTE
"Chief Complaint   Patient presents with     RECHECK     liver tx      /70  Pulse 85  Temp 97.9  F (36.6  C) (Oral)  Ht 1.575 m (5' 2\")  Wt 105.4 kg (232 lb 6.4 oz)  SpO2 99%  BMI 42.51 kg/m2  Colleen Steven, ARIADNE    "

## 2018-12-20 DIAGNOSIS — K74.60 CIRRHOSIS OF LIVER (H): ICD-10-CM

## 2018-12-20 LAB
ALBUMIN SERPL-MCNC: 2.4 G/DL (ref 3.4–5)
BILIRUB SERPL-MCNC: 3.7 MG/DL (ref 0.2–1.3)
CREAT SERPL-MCNC: 0.55 MG/DL (ref 0.52–1.04)
GFR SERPL CREATININE-BSD FRML MDRD: >90 ML/MIN/{1.73_M2}
INR PPP: 1.95 (ref 0.86–1.14)
SODIUM SERPL-SCNC: 138 MMOL/L (ref 133–144)

## 2019-01-15 ENCOUNTER — TELEPHONE (OUTPATIENT)
Dept: GASTROENTEROLOGY | Facility: CLINIC | Age: 59
End: 2019-01-15

## 2019-01-21 DIAGNOSIS — K74.60 CIRRHOSIS OF LIVER (H): ICD-10-CM

## 2019-01-21 LAB
ALBUMIN SERPL-MCNC: 2.3 G/DL (ref 3.4–5)
BILIRUB SERPL-MCNC: 5.5 MG/DL (ref 0.2–1.3)
CREAT SERPL-MCNC: 0.62 MG/DL (ref 0.52–1.04)
GFR SERPL CREATININE-BSD FRML MDRD: >90 ML/MIN/{1.73_M2}
INR PPP: 2.02 (ref 0.86–1.14)
SODIUM SERPL-SCNC: 136 MMOL/L (ref 133–144)

## 2019-02-18 DIAGNOSIS — K75.81 LIVER CIRRHOSIS SECONDARY TO NASH (H): Primary | ICD-10-CM

## 2019-02-18 DIAGNOSIS — K74.60 LIVER CIRRHOSIS SECONDARY TO NASH (H): Primary | ICD-10-CM

## 2019-02-19 ENCOUNTER — OFFICE VISIT (OUTPATIENT)
Dept: GASTROENTEROLOGY | Facility: CLINIC | Age: 59
End: 2019-02-19
Attending: INTERNAL MEDICINE
Payer: COMMERCIAL

## 2019-02-19 VITALS
SYSTOLIC BLOOD PRESSURE: 106 MMHG | HEIGHT: 62 IN | BODY MASS INDEX: 43.65 KG/M2 | WEIGHT: 237.2 LBS | OXYGEN SATURATION: 95 % | TEMPERATURE: 98.1 F | HEART RATE: 83 BPM | DIASTOLIC BLOOD PRESSURE: 67 MMHG

## 2019-02-19 DIAGNOSIS — R60.9 EDEMA, UNSPECIFIED TYPE: Primary | ICD-10-CM

## 2019-02-19 DIAGNOSIS — K74.60 LIVER CIRRHOSIS SECONDARY TO NASH (H): ICD-10-CM

## 2019-02-19 DIAGNOSIS — E83.19 HIGH HEPATIC IRON CONCENTRATION DETERMINED BY BIOPSY OF LIVER: ICD-10-CM

## 2019-02-19 DIAGNOSIS — K74.60 CIRRHOSIS OF LIVER (H): Primary | ICD-10-CM

## 2019-02-19 DIAGNOSIS — E87.70 HYPERVOLEMIA, UNSPECIFIED HYPERVOLEMIA TYPE: ICD-10-CM

## 2019-02-19 DIAGNOSIS — E88.01 HETEROZYGOUS ALPHA 1-ANTITRYPSIN DEFICIENCY (H): ICD-10-CM

## 2019-02-19 DIAGNOSIS — K75.81 LIVER CIRRHOSIS SECONDARY TO NASH (H): ICD-10-CM

## 2019-02-19 DIAGNOSIS — Z76.82 PATIENT ON WAITING LIST FOR LIVER TRANSPLANT: ICD-10-CM

## 2019-02-19 DIAGNOSIS — Z12.9 SCREENING FOR CANCER: ICD-10-CM

## 2019-02-19 DIAGNOSIS — K74.60 CIRRHOSIS OF LIVER (H): ICD-10-CM

## 2019-02-19 LAB
AFP SERPL-MCNC: 5.5 UG/L (ref 0–8)
ALBUMIN SERPL-MCNC: 2.2 G/DL (ref 3.4–5)
BILIRUB SERPL-MCNC: 5.9 MG/DL (ref 0.2–1.3)
CREAT SERPL-MCNC: 0.56 MG/DL (ref 0.52–1.04)
DEPRECATED CALCIDIOL+CALCIFEROL SERPL-MC: 14 UG/L (ref 20–75)
FERRITIN SERPL-MCNC: 825 NG/ML (ref 8–252)
GFR SERPL CREATININE-BSD FRML MDRD: >90 ML/MIN/{1.73_M2}
INR PPP: 2.21 (ref 0.86–1.14)
IRON SATN MFR SERPL: 95 % (ref 15–46)
IRON SERPL-MCNC: 188 UG/DL (ref 35–180)
SODIUM SERPL-SCNC: 139 MMOL/L (ref 133–144)
TIBC SERPL-MCNC: 198 UG/DL (ref 240–430)

## 2019-02-19 PROCEDURE — 82565 ASSAY OF CREATININE: CPT | Performed by: INTERNAL MEDICINE

## 2019-02-19 PROCEDURE — 82306 VITAMIN D 25 HYDROXY: CPT | Performed by: INTERNAL MEDICINE

## 2019-02-19 PROCEDURE — 82105 ALPHA-FETOPROTEIN SERUM: CPT | Performed by: INTERNAL MEDICINE

## 2019-02-19 PROCEDURE — 82728 ASSAY OF FERRITIN: CPT | Performed by: INTERNAL MEDICINE

## 2019-02-19 PROCEDURE — 82040 ASSAY OF SERUM ALBUMIN: CPT | Performed by: INTERNAL MEDICINE

## 2019-02-19 PROCEDURE — 84295 ASSAY OF SERUM SODIUM: CPT | Performed by: INTERNAL MEDICINE

## 2019-02-19 PROCEDURE — 36415 COLL VENOUS BLD VENIPUNCTURE: CPT | Performed by: INTERNAL MEDICINE

## 2019-02-19 PROCEDURE — 85610 PROTHROMBIN TIME: CPT | Performed by: INTERNAL MEDICINE

## 2019-02-19 PROCEDURE — 83540 ASSAY OF IRON: CPT | Performed by: INTERNAL MEDICINE

## 2019-02-19 PROCEDURE — G0463 HOSPITAL OUTPT CLINIC VISIT: HCPCS | Mod: ZF

## 2019-02-19 PROCEDURE — 83550 IRON BINDING TEST: CPT | Performed by: INTERNAL MEDICINE

## 2019-02-19 PROCEDURE — 82247 BILIRUBIN TOTAL: CPT | Performed by: INTERNAL MEDICINE

## 2019-02-19 RX ORDER — BUMETANIDE 0.5 MG/1
0.5 TABLET ORAL DAILY
Qty: 90 TABLET | Refills: 3 | Status: SHIPPED | OUTPATIENT
Start: 2019-02-19 | End: 2019-03-29

## 2019-02-19 ASSESSMENT — MIFFLIN-ST. JEOR: SCORE: 1609.18

## 2019-02-19 ASSESSMENT — PAIN SCALES - GENERAL: PAINLEVEL: NO PAIN (0)

## 2019-02-19 NOTE — PROGRESS NOTES
Dr. Leventhal recommending abd US within the next month. Order placed and message routed for scheduling.

## 2019-02-19 NOTE — LETTER
2/19/2019      RE: Nicci Pemberton  [address] [facility] [address]       Date of Service: 2/19/2019     Subjective:            Nicci Pemberton is a 58 year old female presenting for evaluation of liver disease    History of Present Illness   Nicci Pemberton is a 58 year old female with past medical history of obesity, lymphedema, and cirrhosis secondary to combination of nonalcoholic fatty liver disease, iron overload, and alpha-1 antitrypsin MZ phenotype who presents for routine follow-up.  Of note she is actively listed for liver transplant at [facility].    Since last seen she has been doing well.  Denies changes in her mental status or worsening of confusion, and her  confirms this.  She notes she continues to have issues with significant bilateral lower extremity edema.  When asked what she is not on Lasix, she notes that she has a history of allergy to sulfa and does not remember what reaction she had to Lasix in the past.  She does not feel that her spironolactone 100 mg twice daily is adequate for diuresis at this time.  She has had no issues with melena or bright red blood per rectum, denies issues with nausea or emesis.    She has many questions during clinic visit regarding her liver disease, symptoms that she is experiencing, and long-term options.    Past Medical History:  Past Medical History:   Diagnosis Date     Anemia      Cellulitis      Cirrhosis of liver (H) 6/18/2018     Heterozygous alpha 1-antitrypsin deficiency (H)      Liver cirrhosis secondary to ANGULO (H)      Lymphedema      Osteoarthritis        Surgical History:  Past Surgical History:   Procedure Laterality Date     COLONOSCOPY N/A 6/22/2018    Procedure: COLONOSCOPY;  colonoscopy;  Surgeon: Hugo Patel MD;  Location:  OR       Social History:  Social History     Socioeconomic History     Marital status:      Spouse name: Not on file     Number of children: Not on file     Years of education:  "Not on file     Highest education level: Not on file   Social Needs     Financial resource strain: Not on file     Food insecurity - worry: Not on file     Food insecurity - inability: Not on file     Transportation needs - medical: Not on file     Transportation needs - non-medical: Not on file   Occupational History     Not on file   Tobacco Use     Smoking status: Former Smoker     Last attempt to quit: 2011     Years since quittin.1     Smokeless tobacco: Never Used     Tobacco comment: weekend smoker    Substance and Sexual Activity     Alcohol use: No     Drug use: No     Sexual activity: Not on file   Other Topics Concern     Parent/sibling w/ CABG, MI or angioplasty before 65F 55M? Not Asked   Social History Narrative     Not on file       Family History:  Family History   Problem Relation Age of Onset     Cirrhosis No family hx of      Liver Cancer No family hx of             Medications:  Current Outpatient Medications   Medication     bumetanide (BUMEX) 0.5 MG tablet     Cholecalciferol (VITAMIN D-3) 1000 units CAPS     levothyroxine (SYNTHROID/LEVOTHROID) 125 MCG tablet     magnesium oxide (MAG-OX) 400 MG tablet     spironolactone (ALDACTONE) 100 MG tablet     diclofenac (VOLTAREN) 1 % GEL topical gel     No current facility-administered medications for this visit.        Review of Systems  A complete 10 point review of systems was asked and answered in the negative unless specifically commented upon in the HPI    Objective:           Vitals:    19 0846   BP: 106/67   Pulse: 83   Temp: 98.1  F (36.7  C)   TempSrc: Oral   SpO2: 95%   Weight: 107.6 kg (237 lb 3.2 oz)   Height: 1.575 m (5' 2\")     Body mass index is 43.38 kg/m .     Physical Exam    Vitals reviewed.   Constitutional: Well-developed, well-nourished, in no apparent distress.    HEENT: Normocephalic. + scleral icterus. Moist oral mucosa. Dentition normal  Neck/Lymph: Normal ROM, supple. No thyromegaly.  No lymphadenopathy  Cardiac:  " Regular rate and rhythm.  No overt murmurs  Respiratory: Clear to auscultation bilaterally.  No wheezes or rales  GI:  Abdomen soft, non-distended, non-tender. BS present. No shifting dullness. Moderate obesity  Skin:  Skin is warm and dry. No rash noted.  + jaundice. + spider nevi noted.  + palmar erythema  Peripheral Vascular: 3+ b/l lower extremity edema. 2+ pulses in all extremities  Musculoskeletal:  ROM intact, normal muscle bulk    Psychiatric: Normal mood and affect. Behavior is normal.  Neuro:  No asterixis, No tremor    Labs and Diagnostic tests:  Lab Results   Component Value Date     02/19/2019    POTASSIUM 4.1 11/20/2018    CHLORIDE 104 11/20/2018    CO2 28 11/20/2018    BUN 12 11/20/2018    CR 0.56 02/19/2019     Lab Results   Component Value Date    BILITOTAL 5.9 02/19/2019    ALT 50 11/20/2018    AST 83 11/20/2018    ALKPHOS 124 11/20/2018     Lab Results   Component Value Date    ALBUMIN 2.2 02/19/2019    PROTTOTAL 6.3 11/20/2018      Lab Results   Component Value Date    WBC 4.3 11/20/2018    HGB 13.2 11/20/2018     11/20/2018    PLT 60 11/20/2018     Lab Results   Component Value Date    INR 2.21 02/19/2019      Ref. Range 2/19/2019    Ferritin Latest Ref Range: 8 - 252 ng/mL 825 (H)   Iron Latest Ref Range: 35 - 180 ug/dL 188 (H)   Iron Binding Cap Latest Ref Range: 240 - 430 ug/dL 198 (L)   Iron Saturation Index Latest Ref Range: 15 - 46 % 95 (H)     MELD-Na score: 22 at 2/19/2019  8:25 AM  MELD score: 22 at 2/19/2019  8:25 AM  Calculated from:  Serum Creatinine: 0.56 mg/dL (Rounded to 1 mg/dL) at 2/19/2019  8:25 AM  Serum Sodium: 139 mmol/L (Rounded to 137 mmol/L) at 2/19/2019  8:25 AM  Total Bilirubin: 5.9 mg/dL at 2/19/2019  8:25 AM  INR(ratio): 2.21 at 2/19/2019  8:25 AM  Age: 58 years    Imaging:  MRI Abd w/wo contrast 6/27/2018  FINDINGS:  Biliary Tree: No intrahepatic or extrahepatic biliary dilation.  Pancreas: Mild volume loss and loss of T1 signal. Small cystic lesions,  largest in the body measuring 6 mm. None have suspicious nodularity. Pancreatic duct is normal in caliber.  Liver: Substantial iron deposition in the liver. Numerous siderotic nodules throughout the liver parenchyma. Surface nodularity suggestive of cirrhosis. No suspicious arterially enhancing lesion. No diffusion  restriction.  Gallbladder: Unremarkable.  Spleen: Normal.  Kidneys: Normal.  Adrenal glands: Normal.  Bowel: Unremarkable.  Lymph nodes: Unremarkable.  Blood vessels: Upper abdominal varices. Otherwise unremarkable.  Lung bases: Clear.  Bones and soft tissues: Unremarkable.  Mesentery and abdominal wall: Unremarkable.  Ascites: Trace.      Procedures:  EGD: 4/13/18  - normal    Colonoscopy: 6/22/2018  Findings:        The perianal and digital rectal examinations were normal.        The terminal ileum appeared normal.        A few medium-mouthed diverticula were found in the sigmoid colon.        No additional abnormalities were found on retroflexion.                                                                    Assessment and Plan:    Cirrhosis:    -Secondary to combination of nonalcoholic fatty liver disease, iron overload (secondary), and is a an alpha-1 antitrypsin heterozygote.  -We continue to discuss the need to work on weight loss as able and remain physically active in order to help control the degree of fatty liver deposition  -She is fairly well compensated at this time, despite her current meld score being 22  -She is to continue with MELD labs per protocol    Liver Transplant Candidacy:   - We discussed need for transplantation, organ availability and prioritization process for liver transplantation in the [place].  We discussed the guiding principals of justice and equality that dictate transplant candidacy  - We discussed the MELD score, the variables that are followed, and how the MELD score impacts transplantation  - We discussed the evaluation process for candidacy  - We  discussed donor types - including  donors (DBD, DCD) and living donors  - We discussed factors that can impact quality of organs available, including extended criteria donor.  - We specifically discussed the potential for use of organs that may test positive for either hepatitis B or hepatitis C infections.  We clearly explained that the organ quality would be thoroughly reviewed prior to accepting for transplant, and we discussed the management of both hepatitis B and hepatitis C in the post transplant setting.   -After weighing the potential benefits and risks of receiving organs from these particular donors, the patient verbalized to both me and transplant RN that she would be willing to accept organs from patients exposed to hepatitis B or hepatitis C    Hepatocellular Cancer Screening:   -She is overdue for screening for hepatocellular carcinoma  -We will order an abdominal ultrasound for her to be completed locally  - Recommend screening for HCC every 6 months with either abdominal ultrasound or by alternating abdominal ultrasound with EITHER a triple/quad phase CT Liver with IV contrast OR a Quad phase MRI Liver with IV contrast.  AFP levels should be checked every 6 months at time of imaging screen.    Ascites:  -She does have issues with volume overload, particularly lymphedema.  She does not feel that her spelled lactone 100 mg twice daily is adequate  -She has tried furosemide in the past, but had an unclear reaction.  She is willing to try again as lymphedema is worsening  - Will trial Bumex 0.5mg daily  - Repeat BMP in 2 weeks  - Continue to follow a sodium restricted (<2g sodium diet)     Hepatic Encephalopathy:  - no acute issues    Esophageal Varices:   - Last EGD 4/3/2018 was normal  - Recommend repeating an upper GI endoscopy in 2020 .  If evidence of medium/large esophageal varices, would recommend esophageal varix band ligation or the initiation of a non-selective beta-blocker  (carvedilol or propranolol).  If band ligation is performed, please continue to repeat until eraditation of varices.      Nutrition:  As with most patients with chronic liver disease, there is a significant degree of protein malnutrition.  Dicussed need to change dietary habits to improve overall protein balance.  Discussed the importance of eating between 1.2-1.5g/kg/day lean protein like eggs, fish, chicken, nuts, and legumes, in addition to a diet rich in fresh fruits and vegetables.  Continue to follow a sodium restricted (<2g sodium diet) and discussed the need to minimize the intake of carbohydrates and sugars, to avoid obesity.   - Strongly encourage protein supplements 2-3 times daily (Boost, Ensure, Dorothy Instant Milk, etc.) to meet protein and caloric intake.  - Recommend a bedtime snack with protein and complex carbohydrate to minimize risk of muscle catabolism overnight    Routine Health Care in Patient with Chronic Liver Disease:  - We recommend screening for hepatitis A and B, please vaccinate if not immune  - All patients with liver disease, particularly those with cholestatic liver disease, are at an increased risk for osteoporosis.  We strongly recommend screening for Vitamin D deficiency at least twice yearly with aggressive supplementation/replacement as indicated.    - We also recommend a screening DEXA scan to evaluate for osteoporosis.  If present, should treat with calcium, Vitamin D supplementation, and recommend consideration of bisphosphonate therapy.  Also recommend follow up DEXA scans to evaluate for improvement of bone density on therapy.  - All patients with liver disease should avoid the use of Non-steroidal Anti-Inflammatory (NSAID) medications as they can cause significant injury to the kidneys in this population    Follow Up:  6 month     Thank you very much for the opportunity to participate in the care of this patient.  If you have any further questions, please don't hesitate  to contact our office.    Thomas M. Leventhal, M.D.   of Medicine  Advanced & Transplant Hepatology  [facility]      Thomas M. Leventhal, MD

## 2019-02-19 NOTE — PROGRESS NOTES
Dr. Leventhal recommending repeat BMP with next MELD labs ~ 3/20/19. Order placed per Dr. Leventhal. This reviewed with pt during clinic visit.

## 2019-02-19 NOTE — NURSING NOTE
"Chief Complaint   Patient presents with     RECHECK     liver cirrhosis secondary to ANGULO     /67   Pulse 83   Temp 98.1  F (36.7  C) (Oral)   Ht 1.575 m (5' 2\")   Wt 107.6 kg (237 lb 3.2 oz)   SpO2 95%   BMI 43.38 kg/m    Yari Lewis CMA    "

## 2019-02-19 NOTE — PROGRESS NOTES
Date of Service: 2/19/2019     Subjective:            Nicci Pemberton is a 58 year old female presenting for evaluation of liver disease    History of Present Illness   Nicci Pemberton is a 58 year old female with past medical history of obesity, lymphedema, and cirrhosis secondary to combination of nonalcoholic fatty liver disease, iron overload, and alpha-1 antitrypsin MZ phenotype who presents for routine follow-up.  Of note she is actively listed for liver transplant at [facility].    Since last seen she has been doing well.  Denies changes in her mental status or worsening of confusion, and her  confirms this.  She notes she continues to have issues with significant bilateral lower extremity edema.  When asked what she is not on Lasix, she notes that she has a history of allergy to sulfa and does not remember what reaction she had to Lasix in the past.  She does not feel that her spironolactone 100 mg twice daily is adequate for diuresis at this time.  She has had no issues with melena or bright red blood per rectum, denies issues with nausea or emesis.    She has many questions during clinic visit regarding her liver disease, symptoms that she is experiencing, and long-term options.    Past Medical History:  Past Medical History:   Diagnosis Date     Anemia      Cellulitis      Cirrhosis of liver (H) 6/18/2018     Heterozygous alpha 1-antitrypsin deficiency (H)      Liver cirrhosis secondary to ANGULO (H)      Lymphedema      Osteoarthritis        Surgical History:  Past Surgical History:   Procedure Laterality Date     COLONOSCOPY N/A 6/22/2018    Procedure: COLONOSCOPY;  colonoscopy;  Surgeon: Hugo Patel MD;  Location:  OR       Social History:  Social History     Socioeconomic History     Marital status:      Spouse name: Not on file     Number of children: Not on file     Years of education: Not on file     Highest education level: Not on file   Social Needs     Financial  "resource strain: Not on file     Food insecurity - worry: Not on file     Food insecurity - inability: Not on file     Transportation needs - medical: Not on file     Transportation needs - non-medical: Not on file   Occupational History     Not on file   Tobacco Use     Smoking status: Former Smoker     Last attempt to quit: 2011     Years since quittin.1     Smokeless tobacco: Never Used     Tobacco comment: weekend smoker    Substance and Sexual Activity     Alcohol use: No     Drug use: No     Sexual activity: Not on file   Other Topics Concern     Parent/sibling w/ CABG, MI or angioplasty before 65F 55M? Not Asked   Social History Narrative     Not on file       Family History:  Family History   Problem Relation Age of Onset     Cirrhosis No family hx of      Liver Cancer No family hx of             Medications:  Current Outpatient Medications   Medication     bumetanide (BUMEX) 0.5 MG tablet     Cholecalciferol (VITAMIN D-3) 1000 units CAPS     levothyroxine (SYNTHROID/LEVOTHROID) 125 MCG tablet     magnesium oxide (MAG-OX) 400 MG tablet     spironolactone (ALDACTONE) 100 MG tablet     diclofenac (VOLTAREN) 1 % GEL topical gel     No current facility-administered medications for this visit.        Review of Systems  A complete 10 point review of systems was asked and answered in the negative unless specifically commented upon in the HPI    Objective:           Vitals:    19 0846   BP: 106/67   Pulse: 83   Temp: 98.1  F (36.7  C)   TempSrc: Oral   SpO2: 95%   Weight: 107.6 kg (237 lb 3.2 oz)   Height: 1.575 m (5' 2\")     Body mass index is 43.38 kg/m .     Physical Exam    Vitals reviewed.   Constitutional: Well-developed, well-nourished, in no apparent distress.    HEENT: Normocephalic. + scleral icterus. Moist oral mucosa. Dentition normal  Neck/Lymph: Normal ROM, supple. No thyromegaly.  No lymphadenopathy  Cardiac:  Regular rate and rhythm.  No overt murmurs  Respiratory: Clear to auscultation " bilaterally.  No wheezes or rales  GI:  Abdomen soft, non-distended, non-tender. BS present. No shifting dullness. Moderate obesity  Skin:  Skin is warm and dry. No rash noted.  + jaundice. + spider nevi noted.  + palmar erythema  Peripheral Vascular: 3+ b/l lower extremity edema. 2+ pulses in all extremities  Musculoskeletal:  ROM intact, normal muscle bulk    Psychiatric: Normal mood and affect. Behavior is normal.  Neuro:  No asterixis, No tremor    Labs and Diagnostic tests:  Lab Results   Component Value Date     02/19/2019    POTASSIUM 4.1 11/20/2018    CHLORIDE 104 11/20/2018    CO2 28 11/20/2018    BUN 12 11/20/2018    CR 0.56 02/19/2019     Lab Results   Component Value Date    BILITOTAL 5.9 02/19/2019    ALT 50 11/20/2018    AST 83 11/20/2018    ALKPHOS 124 11/20/2018     Lab Results   Component Value Date    ALBUMIN 2.2 02/19/2019    PROTTOTAL 6.3 11/20/2018      Lab Results   Component Value Date    WBC 4.3 11/20/2018    HGB 13.2 11/20/2018     11/20/2018    PLT 60 11/20/2018     Lab Results   Component Value Date    INR 2.21 02/19/2019      Ref. Range 2/19/2019    Ferritin Latest Ref Range: 8 - 252 ng/mL 825 (H)   Iron Latest Ref Range: 35 - 180 ug/dL 188 (H)   Iron Binding Cap Latest Ref Range: 240 - 430 ug/dL 198 (L)   Iron Saturation Index Latest Ref Range: 15 - 46 % 95 (H)     MELD-Na score: 22 at 2/19/2019  8:25 AM  MELD score: 22 at 2/19/2019  8:25 AM  Calculated from:  Serum Creatinine: 0.56 mg/dL (Rounded to 1 mg/dL) at 2/19/2019  8:25 AM  Serum Sodium: 139 mmol/L (Rounded to 137 mmol/L) at 2/19/2019  8:25 AM  Total Bilirubin: 5.9 mg/dL at 2/19/2019  8:25 AM  INR(ratio): 2.21 at 2/19/2019  8:25 AM  Age: 58 years    Imaging:  MRI Abd w/wo contrast 6/27/2018  FINDINGS:  Biliary Tree: No intrahepatic or extrahepatic biliary dilation.  Pancreas: Mild volume loss and loss of T1 signal. Small cystic lesions, largest in the body measuring 6 mm. None have suspicious nodularity. Pancreatic  duct is normal in caliber.  Liver: Substantial iron deposition in the liver. Numerous siderotic nodules throughout the liver parenchyma. Surface nodularity suggestive of cirrhosis. No suspicious arterially enhancing lesion. No diffusion  restriction.  Gallbladder: Unremarkable.  Spleen: Normal.  Kidneys: Normal.  Adrenal glands: Normal.  Bowel: Unremarkable.  Lymph nodes: Unremarkable.  Blood vessels: Upper abdominal varices. Otherwise unremarkable.  Lung bases: Clear.  Bones and soft tissues: Unremarkable.  Mesentery and abdominal wall: Unremarkable.  Ascites: Trace.      Procedures:  EGD: 18  - normal    Colonoscopy: 2018  Findings:        The perianal and digital rectal examinations were normal.        The terminal ileum appeared normal.        A few medium-mouthed diverticula were found in the sigmoid colon.        No additional abnormalities were found on retroflexion.                                                                    Assessment and Plan:    Cirrhosis:    -Secondary to combination of nonalcoholic fatty liver disease, iron overload (secondary), and is a an alpha-1 antitrypsin heterozygote.  -We continue to discuss the need to work on weight loss as able and remain physically active in order to help control the degree of fatty liver deposition  -She is fairly well compensated at this time, despite her current meld score being 22  -She is to continue with MELD labs per protocol    Liver Transplant Candidacy:   - We discussed need for transplantation, organ availability and prioritization process for liver transplantation in the [place].  We discussed the guiding principals of justice and equality that dictate transplant candidacy  - We discussed the MELD score, the variables that are followed, and how the MELD score impacts transplantation  - We discussed the evaluation process for candidacy  - We discussed donor types - including  donors (DBD, DCD) and living donors  - We  discussed factors that can impact quality of organs available, including extended criteria donor.  - We specifically discussed the potential for use of organs that may test positive for either hepatitis B or hepatitis C infections.  We clearly explained that the organ quality would be thoroughly reviewed prior to accepting for transplant, and we discussed the management of both hepatitis B and hepatitis C in the post transplant setting.   -After weighing the potential benefits and risks of receiving organs from these particular donors, the patient verbalized to both me and transplant RN that she would be willing to accept organs from patients exposed to hepatitis B or hepatitis C    Hepatocellular Cancer Screening:   -She is overdue for screening for hepatocellular carcinoma  -We will order an abdominal ultrasound for her to be completed locally  - Recommend screening for HCC every 6 months with either abdominal ultrasound or by alternating abdominal ultrasound with EITHER a triple/quad phase CT Liver with IV contrast OR a Quad phase MRI Liver with IV contrast.  AFP levels should be checked every 6 months at time of imaging screen.    Ascites:  -She does have issues with volume overload, particularly lymphedema.  She does not feel that her spelled lactone 100 mg twice daily is adequate  -She has tried furosemide in the past, but had an unclear reaction.  She is willing to try again as lymphedema is worsening  - Will trial Bumex 0.5mg daily  - Repeat BMP in 2 weeks  - Continue to follow a sodium restricted (<2g sodium diet)     Hepatic Encephalopathy:  - no acute issues    Esophageal Varices:   - Last EGD 4/3/2018 was normal  - Recommend repeating an upper GI endoscopy in April 2020 .  If evidence of medium/large esophageal varices, would recommend esophageal varix band ligation or the initiation of a non-selective beta-blocker (carvedilol or propranolol).  If band ligation is performed, please continue to repeat  until eraditation of varices.      Nutrition:  As with most patients with chronic liver disease, there is a significant degree of protein malnutrition.  Dicussed need to change dietary habits to improve overall protein balance.  Discussed the importance of eating between 1.2-1.5g/kg/day lean protein like eggs, fish, chicken, nuts, and legumes, in addition to a diet rich in fresh fruits and vegetables.  Continue to follow a sodium restricted (<2g sodium diet) and discussed the need to minimize the intake of carbohydrates and sugars, to avoid obesity.   - Strongly encourage protein supplements 2-3 times daily (Boost, Ensure, [place] Instant Milk, etc.) to meet protein and caloric intake.  - Recommend a bedtime snack with protein and complex carbohydrate to minimize risk of muscle catabolism overnight    Routine Health Care in Patient with Chronic Liver Disease:  - We recommend screening for hepatitis A and B, please vaccinate if not immune  - All patients with liver disease, particularly those with cholestatic liver disease, are at an increased risk for osteoporosis.  We strongly recommend screening for Vitamin D deficiency at least twice yearly with aggressive supplementation/replacement as indicated.    - We also recommend a screening DEXA scan to evaluate for osteoporosis.  If present, should treat with calcium, Vitamin D supplementation, and recommend consideration of bisphosphonate therapy.  Also recommend follow up DEXA scans to evaluate for improvement of bone density on therapy.  - All patients with liver disease should avoid the use of Non-steroidal Anti-Inflammatory (NSAID) medications as they can cause significant injury to the kidneys in this population    Follow Up:  6 month     Thank you very much for the opportunity to participate in the care of this patient.  If you have any further questions, please don't hesitate to contact our office.    Thomas M. Leventhal, M.D.   of  Medicine  Advanced & Transplant Hepatology  [facility]

## 2019-02-19 NOTE — LETTER
RE: Nicci Pemberton  [address] [address]     Dear Colleague,    Thank you for referring your patient, iNcci Pemberton, to the [facility] HEPATOLOGY at [facility]. Please see a copy of my visit note below.    Date of Service: 2/19/2019     Subjective:            Nicci Pemberton is a 58 year old female presenting for evaluation of liver disease    History of Present Illness   Nicci Pemberton is a 58 year old female with past medical history of obesity, lymphedema, and cirrhosis secondary to combination of nonalcoholic fatty liver disease, iron overload, and alpha-1 antitrypsin MZ phenotype who presents for routine follow-up.  Of note she is actively listed for liver transplant at [facility].    Since last seen she has been doing well.  Denies changes in her mental status or worsening of confusion, and her  confirms this.  She notes she continues to have issues with significant bilateral lower extremity edema.  When asked what she is not on Lasix, she notes that she has a history of allergy to sulfa and does not remember what reaction she had to Lasix in the past.  She does not feel that her spironolactone 100 mg twice daily is adequate for diuresis at this time.  She has had no issues with melena or bright red blood per rectum, denies issues with nausea or emesis.    She has many questions during clinic visit regarding her liver disease, symptoms that she is experiencing, and long-term options.    Past Medical History:  Past Medical History:   Diagnosis Date     Anemia      Cellulitis      Cirrhosis of liver (H) 6/18/2018     Heterozygous alpha 1-antitrypsin deficiency (H)      Liver cirrhosis secondary to ANGULO (H)      Lymphedema      Osteoarthritis        Surgical History:  Past Surgical History:   Procedure Laterality Date     COLONOSCOPY N/A 6/22/2018    Procedure: COLONOSCOPY;  colonoscopy;  Surgeon: Hugo Patel MD;  Location:  OR  "      Social History:  Social History     Socioeconomic History     Marital status:      Spouse name: Not on file     Number of children: Not on file     Years of education: Not on file     Highest education level: Not on file   Social Needs     Financial resource strain: Not on file     Food insecurity - worry: Not on file     Food insecurity - inability: Not on file     Transportation needs - medical: Not on file     Transportation needs - non-medical: Not on file   Occupational History     Not on file   Tobacco Use     Smoking status: Former Smoker     Last attempt to quit:      Years since quittin.1     Smokeless tobacco: Never Used     Tobacco comment: weekend smoker    Substance and Sexual Activity     Alcohol use: No     Drug use: No     Sexual activity: Not on file   Other Topics Concern     Parent/sibling w/ CABG, MI or angioplasty before 65F 55M? Not Asked   Social History Narrative     Not on file       Family History:  Family History   Problem Relation Age of Onset     Cirrhosis No family hx of      Liver Cancer No family hx of             Medications:  Current Outpatient Medications   Medication     bumetanide (BUMEX) 0.5 MG tablet     Cholecalciferol (VITAMIN D-3) 1000 units CAPS     levothyroxine (SYNTHROID/LEVOTHROID) 125 MCG tablet     magnesium oxide (MAG-OX) 400 MG tablet     spironolactone (ALDACTONE) 100 MG tablet     diclofenac (VOLTAREN) 1 % GEL topical gel     No current facility-administered medications for this visit.        Review of Systems  A complete 10 point review of systems was asked and answered in the negative unless specifically commented upon in the HPI    Objective:           Vitals:    19 0846   BP: 106/67   Pulse: 83   Temp: 98.1  F (36.7  C)   TempSrc: Oral   SpO2: 95%   Weight: 107.6 kg (237 lb 3.2 oz)   Height: 1.575 m (5' 2\")     Body mass index is 43.38 kg/m .     Physical Exam    Vitals reviewed.   Constitutional: Well-developed, well-nourished, in " no apparent distress.    HEENT: Normocephalic. + scleral icterus. Moist oral mucosa. Dentition normal  Neck/Lymph: Normal ROM, supple. No thyromegaly.  No lymphadenopathy  Cardiac:  Regular rate and rhythm.  No overt murmurs  Respiratory: Clear to auscultation bilaterally.  No wheezes or rales  GI:  Abdomen soft, non-distended, non-tender. BS present. No shifting dullness. Moderate obesity  Skin:  Skin is warm and dry. No rash noted.  + jaundice. + spider nevi noted.  + palmar erythema  Peripheral Vascular: 3+ b/l lower extremity edema. 2+ pulses in all extremities  Musculoskeletal:  ROM intact, normal muscle bulk    Psychiatric: Normal mood and affect. Behavior is normal.  Neuro:  No asterixis, No tremor    Labs and Diagnostic tests:  Lab Results   Component Value Date     02/19/2019    POTASSIUM 4.1 11/20/2018    CHLORIDE 104 11/20/2018    CO2 28 11/20/2018    BUN 12 11/20/2018    CR 0.56 02/19/2019     Lab Results   Component Value Date    BILITOTAL 5.9 02/19/2019    ALT 50 11/20/2018    AST 83 11/20/2018    ALKPHOS 124 11/20/2018     Lab Results   Component Value Date    ALBUMIN 2.2 02/19/2019    PROTTOTAL 6.3 11/20/2018      Lab Results   Component Value Date    WBC 4.3 11/20/2018    HGB 13.2 11/20/2018     11/20/2018    PLT 60 11/20/2018     Lab Results   Component Value Date    INR 2.21 02/19/2019      Ref. Range 2/19/2019    Ferritin Latest Ref Range: 8 - 252 ng/mL 825 (H)   Iron Latest Ref Range: 35 - 180 ug/dL 188 (H)   Iron Binding Cap Latest Ref Range: 240 - 430 ug/dL 198 (L)   Iron Saturation Index Latest Ref Range: 15 - 46 % 95 (H)     MELD-Na score: 22 at 2/19/2019  8:25 AM  MELD score: 22 at 2/19/2019  8:25 AM  Calculated from:  Serum Creatinine: 0.56 mg/dL (Rounded to 1 mg/dL) at 2/19/2019  8:25 AM  Serum Sodium: 139 mmol/L (Rounded to 137 mmol/L) at 2/19/2019  8:25 AM  Total Bilirubin: 5.9 mg/dL at 2/19/2019  8:25 AM  INR(ratio): 2.21 at 2/19/2019  8:25 AM  Age: 58  years    Imaging:  MRI Abd w/wo contrast 6/27/2018  FINDINGS:  Biliary Tree: No intrahepatic or extrahepatic biliary dilation.  Pancreas: Mild volume loss and loss of T1 signal. Small cystic lesions, largest in the body measuring 6 mm. None have suspicious nodularity. Pancreatic duct is normal in caliber.  Liver: Substantial iron deposition in the liver. Numerous siderotic nodules throughout the liver parenchyma. Surface nodularity suggestive of cirrhosis. No suspicious arterially enhancing lesion. No diffusion  restriction.  Gallbladder: Unremarkable.  Spleen: Normal.  Kidneys: Normal.  Adrenal glands: Normal.  Bowel: Unremarkable.  Lymph nodes: Unremarkable.  Blood vessels: Upper abdominal varices. Otherwise unremarkable.  Lung bases: Clear.  Bones and soft tissues: Unremarkable.  Mesentery and abdominal wall: Unremarkable.  Ascites: Trace.      Procedures:  EGD: 4/13/18  - normal    Colonoscopy: 6/22/2018  Findings:        The perianal and digital rectal examinations were normal.        The terminal ileum appeared normal.        A few medium-mouthed diverticula were found in the sigmoid colon.        No additional abnormalities were found on retroflexion.                              Assessment and Plan:    Cirrhosis:    -Secondary to combination of nonalcoholic fatty liver disease, iron overload (secondary), and is a an alpha-1 antitrypsin heterozygote.  -We continue to discuss the need to work on weight loss as able and remain physically active in order to help control the degree of fatty liver deposition  -She is fairly well compensated at this time, despite her current meld score being 22  -She is to continue with MELD labs per protocol    Liver Transplant Candidacy:   - We discussed need for transplantation, organ availability and prioritization process for liver transplantation in the [place].  We discussed the guiding principals of justice and equality that dictate transplant candidacy  - We  discussed the MELD score, the variables that are followed, and how the MELD score impacts transplantation  - We discussed the evaluation process for candidacy  - We discussed donor types - including  donors (DBD, DCD) and living donors  - We discussed factors that can impact quality of organs available, including extended criteria donor.  - We specifically discussed the potential for use of organs that may test positive for either hepatitis B or hepatitis C infections.  We clearly explained that the organ quality would be thoroughly reviewed prior to accepting for transplant, and we discussed the management of both hepatitis B and hepatitis C in the post transplant setting.   -After weighing the potential benefits and risks of receiving organs from these particular donors, the patient verbalized to both me and transplant RN that she would be willing to accept organs from patients exposed to hepatitis B or hepatitis C    Hepatocellular Cancer Screening:   -She is overdue for screening for hepatocellular carcinoma  -We will order an abdominal ultrasound for her to be completed locally  - Recommend screening for HCC every 6 months with either abdominal ultrasound or by alternating abdominal ultrasound with EITHER a triple/quad phase CT Liver with IV contrast OR a Quad phase MRI Liver with IV contrast.  AFP levels should be checked every 6 months at time of imaging screen.    Ascites:  -She does have issues with volume overload, particularly lymphedema.  She does not feel that her spelled lactone 100 mg twice daily is adequate  -She has tried furosemide in the past, but had an unclear reaction.  She is willing to try again as lymphedema is worsening  - Will trial Bumex 0.5mg daily  - Repeat BMP in 2 weeks  - Continue to follow a sodium restricted (<2g sodium diet)     Hepatic Encephalopathy:  - no acute issues    Esophageal Varices:   - Last EGD 4/3/2018 was normal  - Recommend repeating an upper GI endoscopy in  April 2020 .  If evidence of medium/large esophageal varices, would recommend esophageal varix band ligation or the initiation of a non-selective beta-blocker (carvedilol or propranolol).  If band ligation is performed, please continue to repeat until eraditation of varices.      Nutrition:  As with most patients with chronic liver disease, there is a significant degree of protein malnutrition.  Dicussed need to change dietary habits to improve overall protein balance.  Discussed the importance of eating between 1.2-1.5g/kg/day lean protein like eggs, fish, chicken, nuts, and legumes, in addition to a diet rich in fresh fruits and vegetables.  Continue to follow a sodium restricted (<2g sodium diet) and discussed the need to minimize the intake of carbohydrates and sugars, to avoid obesity.   - Strongly encourage protein supplements 2-3 times daily (Boost, Ensure, [place] Instant Milk, etc.) to meet protein and caloric intake.  - Recommend a bedtime snack with protein and complex carbohydrate to minimize risk of muscle catabolism overnight    Routine Health Care in Patient with Chronic Liver Disease:  - We recommend screening for hepatitis A and B, please vaccinate if not immune  - All patients with liver disease, particularly those with cholestatic liver disease, are at an increased risk for osteoporosis.  We strongly recommend screening for Vitamin D deficiency at least twice yearly with aggressive supplementation/replacement as indicated.    - We also recommend a screening DEXA scan to evaluate for osteoporosis.  If present, should treat with calcium, Vitamin D supplementation, and recommend consideration of bisphosphonate therapy.  Also recommend follow up DEXA scans to evaluate for improvement of bone density on therapy.  - All patients with liver disease should avoid the use of Non-steroidal Anti-Inflammatory (NSAID) medications as they can cause significant injury to the kidneys in this population    Follow  Up:  6 month     Thank you very much for the opportunity to participate in the care of this patient.  If you have any further questions, please don't hesitate to contact our office.    Thomas M. Leventhal, M.D.   of Medicine  Advanced & Transplant Hepatology  [facility]

## 2019-02-19 NOTE — PATIENT INSTRUCTIONS
- We have prescribed you a diuretic called Bumex (bumetinide).  Please  at your local pharmacy    Cirrhosis Education  Nutrition  - For patients with cirrhosis, it is very important to eat the right types and amounts of foods.  We recommend a diet low in carbohydrates/sugars and high in fresh fruits/vegetables, with the right amount of protein.  We typically recommend 1.5gm/kg/day of protein, or  grams of protein every day.  - You should eat at least three meals a day and three to four snacks between meals  - Bedtime snacks are especially important (preferrably something with some protein)    - Patients with malnutrition and/or loss of muscular mass can improve their nutrition and muscular mass by drinking two cans of dietary supplements daily, particularly at bedtime.  These would include: Ensure, Boost, [place] Instant Milk, Glucerna, (or similar supplements)  - Please avoid eating raw seafood, especially shellfish, because of risk of serious illness      General Liver Health  - Continue to avoid the use of all non-steroid anti-inflammatory medicines (ibuprofen, Aleve, naproxen, aspirin, etc.) as this can cause serious injury to your kidneys in the setting of liver disease  - If you see a doctor and they prescribe you a new medication, please contact our clinic to let us know what changes are being made  - If you become acutely ill and present to an ER or are admitted to a hospital, please let us know as soon as you are able.  - We recommend that all patients with chronic liver disease be vaccinated against hepatitis A and B.  Please discuss with your primary provider the need for these vaccines  - As patients with liver disease are at an increased risk of developing osteoporosis, we recommend that you have a DEXA scan with appropriate follow up and treatment.   - We recommend screening for Vitamin D deficiency (at least yearly) and aggressive replacement/supplementation as warranted.

## 2019-02-21 ENCOUNTER — TELEPHONE (OUTPATIENT)
Dept: GASTROENTEROLOGY | Facility: CLINIC | Age: 59
End: 2019-02-21

## 2019-02-28 ENCOUNTER — RECORDS - HEALTHEAST (OUTPATIENT)
Dept: LAB | Facility: CLINIC | Age: 59
End: 2019-02-28

## 2019-02-28 LAB — TSH SERPL DL<=0.005 MIU/L-ACNC: 1.8 UIU/ML (ref 0.3–5)

## 2019-03-04 ENCOUNTER — DOCUMENTATION ONLY (OUTPATIENT)
Dept: TRANSPLANT | Facility: CLINIC | Age: 59
End: 2019-03-04

## 2019-03-05 DIAGNOSIS — K74.60 CIRRHOSIS OF LIVER (H): Primary | ICD-10-CM

## 2019-03-05 NOTE — PROGRESS NOTES
Dr. Leventhal recommending hepatitis B surface Antibody with next labs. Order placed per Dr. Leventhal.

## 2019-03-14 ENCOUNTER — TELEPHONE (OUTPATIENT)
Dept: GASTROENTEROLOGY | Facility: CLINIC | Age: 59
End: 2019-03-14

## 2019-03-20 ENCOUNTER — ANCILLARY PROCEDURE (OUTPATIENT)
Dept: ULTRASOUND IMAGING | Facility: CLINIC | Age: 59
End: 2019-03-20
Attending: INTERNAL MEDICINE
Payer: COMMERCIAL

## 2019-03-20 DIAGNOSIS — K74.60 CIRRHOSIS OF LIVER (H): ICD-10-CM

## 2019-03-20 LAB
ALBUMIN SERPL-MCNC: 2.2 G/DL (ref 3.4–5)
ANION GAP SERPL CALCULATED.3IONS-SCNC: 4 MMOL/L (ref 3–14)
BILIRUB SERPL-MCNC: 8 MG/DL (ref 0.2–1.3)
BUN SERPL-MCNC: 16 MG/DL (ref 7–30)
CALCIUM SERPL-MCNC: 8.9 MG/DL (ref 8.5–10.1)
CHLORIDE SERPL-SCNC: 100 MMOL/L (ref 94–109)
CO2 SERPL-SCNC: 30 MMOL/L (ref 20–32)
CREAT SERPL-MCNC: 0.58 MG/DL (ref 0.52–1.04)
GFR SERPL CREATININE-BSD FRML MDRD: >90 ML/MIN/{1.73_M2}
GLUCOSE SERPL-MCNC: 70 MG/DL (ref 70–99)
HBV SURFACE AB SERPL IA-ACNC: 0 M[IU]/ML
INR PPP: 1.94 (ref 0.86–1.14)
POTASSIUM SERPL-SCNC: 4.5 MMOL/L (ref 3.4–5.3)
SODIUM SERPL-SCNC: 134 MMOL/L (ref 133–144)

## 2019-03-21 ENCOUNTER — MYC MEDICAL ADVICE (OUTPATIENT)
Dept: GASTROENTEROLOGY | Facility: CLINIC | Age: 59
End: 2019-03-21

## 2019-03-25 NOTE — TELEPHONE ENCOUNTER
Call back to patient. Patient just wanted to let Dr. Leventhal know that she did have the HBV series already.  Writer will update Dr. Leventhal.    Eleonora Carney,LPN  [facility]ology Clinic    ---------  Attn: Eleonora WALTERS  Pt received your [employer] message but, is having a hard time responding; therefore, she would like you to give her a call back.  Please call pt on her cell phone. Thank you!

## 2019-03-28 ENCOUNTER — TELEPHONE (OUTPATIENT)
Dept: GASTROENTEROLOGY | Facility: CLINIC | Age: 59
End: 2019-03-28

## 2019-03-28 NOTE — TELEPHONE ENCOUNTER
Call to patient letting her know that even thou she had the Hep B vaccination series she is showing no immunity, per Dr. Leventhal pt needs to start the series again. Patient voiced understanding. Requested to be done at primary clinic.  Writer sent fax request to Dr. Ricardo to reorder Hep B series and recheck HBsAb when finished, copy of HBsAb drawn here sent with request. Writers direct call back number given if needed.     Patient plans to call primary clinic next week to schedule first injection.

## 2019-03-29 ENCOUNTER — TELEPHONE (OUTPATIENT)
Dept: GASTROENTEROLOGY | Facility: CLINIC | Age: 59
End: 2019-03-29

## 2019-03-29 DIAGNOSIS — R60.9 EDEMA, UNSPECIFIED TYPE: ICD-10-CM

## 2019-03-29 RX ORDER — BUMETANIDE 1 MG/1
1 TABLET ORAL DAILY
Qty: 90 TABLET | Refills: 1 | Status: ON HOLD | OUTPATIENT
Start: 2019-03-29 | End: 2019-09-21

## 2019-03-29 NOTE — TELEPHONE ENCOUNTER
Call back to patient with input from Dr. Leventhal as stated below.  Patient is tolerating Bumex and is willing to increase to 1 mg daily. Updated script sent to [facility] on file. Patient will update writer next week if no improvement.    Eleonora Carney LPN  [facility]ology Clinic        ----- Message from Thomas Michael Leventhal, MD sent at 3/29/2019  8:26 AM CDT -----  Regarding: RE: stomach ache  Is she tolerating the Bumex we started her on?  If she is, lets increase it to 1mg daily.  She may be having all this GI upset because of volume overload.    - I am fine if she takes TUMS or Pepcid for dyspepsia    TL  ----- Message -----  From: Eleonora Carney LPN  Sent: 3/28/2019   8:50 AM  To: Thomas Michael Leventhal, MD  Subject: stomach ache                                     Called patient letting her know that she needs to get the hep B series again because she shows no immunity. During our conversation she wanted me to mention to you that for the last week she has been having stomach aches (intermittent) with nausea at times, starts in the morning then goes away comes back at night when it's the worse.  She has been taken 3 Tums at night for the last 4 nights which helps. Pt reports food doesn't make it worse, stools are formed, no fevers or vomiting.  Pain is never worse then an ache.    Thoughts?

## 2019-04-04 ENCOUNTER — TELEPHONE (OUTPATIENT)
Dept: GASTROENTEROLOGY | Facility: CLINIC | Age: 59
End: 2019-04-04

## 2019-04-04 NOTE — TELEPHONE ENCOUNTER
Call back to patient with input per Dr. Leventhal as stated below. Patient voiced understanding of plan.    Eleonora Carney LPN  [facility]ology [facility]      --- Message from Thomas Michael Leventhal, MD sent at 4/4/2019  2:29 PM CDT -----  Regarding: RE: Stomach aches  She should see her PCP    TUMS, pepcid, and omeprazole are all safe and fine    She can start the Hep B series.    TL  ----- Message -----  From: Eleonora Carney LPN  Sent: 4/4/2019   2:12 PM  To: Thomas Michael Leventhal, MD  Subject: FW: Stomach aches                                 In addition to below message. Pt also reports losing about a pound per day, urinating a lot with Bumex increase. She also would like to know if it is ok to start the Hep B series with her stomach issue?  ----- Message -----  From: Eleonora Carney LPN  Sent: 4/4/2019  11:43 AM  To: Thomas Michael Leventhal, MD  Subject: Stomach aches                                    Patient still having stomach aches no improvement with increasing Bumex patient wondering if she needs labs, imaging or medication. Should I have her see her PCP?    Call message from 3/29/19  stomach aches (intermittent) with nausea at times, starts in the morning then goes away comes back at night when it's the worse.  She has been taken 3 Tums at night for the last 4 nights which helps. Pt reports food doesn't make it worse, stools are formed, no fevers or vomiting.  Pain is never worse then an ache.    You had recommended increasing her bumex to 1 mg for possible fluid overload and gave ok for TUMS or Pepcid for dyspepsia.

## 2019-04-09 ENCOUNTER — RECORDS - HEALTHEAST (OUTPATIENT)
Dept: LAB | Facility: CLINIC | Age: 59
End: 2019-04-09

## 2019-04-09 LAB
ANION GAP SERPL CALCULATED.3IONS-SCNC: 9 MMOL/L (ref 5–18)
BUN SERPL-MCNC: 18 MG/DL (ref 8–22)
CALCIUM SERPL-MCNC: 9 MG/DL (ref 8.5–10.5)
CHLORIDE BLD-SCNC: 99 MMOL/L (ref 98–107)
CO2 SERPL-SCNC: 24 MMOL/L (ref 22–31)
CREAT SERPL-MCNC: 0.63 MG/DL (ref 0.6–1.1)
GFR SERPL CREATININE-BSD FRML MDRD: >60 ML/MIN/1.73M2
GLUCOSE BLD-MCNC: 94 MG/DL (ref 70–125)
POTASSIUM BLD-SCNC: 4.9 MMOL/L (ref 3.5–5)
SODIUM SERPL-SCNC: 132 MMOL/L (ref 136–145)

## 2019-04-15 ENCOUNTER — TELEPHONE (OUTPATIENT)
Dept: GASTROENTEROLOGY | Facility: CLINIC | Age: 59
End: 2019-04-15

## 2019-04-15 DIAGNOSIS — K75.81 LIVER CIRRHOSIS SECONDARY TO NASH (H): Primary | ICD-10-CM

## 2019-04-15 DIAGNOSIS — K74.60 LIVER CIRRHOSIS SECONDARY TO NASH (H): Primary | ICD-10-CM

## 2019-04-15 NOTE — TELEPHONE ENCOUNTER
Connected with patient letting her know per Dr. Leventhal BMP from 4/9/19 looks good. During conversation patient reported that she was seen in the ED for altered mental status and right hand tremor. ER MD Discussed case with Dr. Sanchez from GI and was decided lactulose was not needed. Patient was discharged home on ABX for UTI and instructions to follow-up with Dr. Leventhal and PCP.     Writer was able to get patient in to see Dr. Leventhal on 4/18/19 with labs prior.    Eleonora Carney LPN  [facility]ology Clinic    ----- Message from Thomas Michael Leventhal, MD sent at 4/14/2019 11:32 AM CDT -----  Regarding: RE: BMP  Looks good!    TL  ----- Message -----  From: Eleonora Carney LPN  Sent: 4/12/2019   2:21 PM  To: Thomas Michael Leventhal, MD  Subject: BMP                                              For review    4/9/19  3:28 PM    Contains abnormal data Basic Metabolic Panel   Order: [id number]      Ref Range & Units Value  Sodium 136 - 145 mmol/L 132Low    Potassium 3.5 - 5.0 mmol/L 4.9   Chloride 98 - 107 mmol/L 99   CO2             22 - 31 mmol/L 24   Anion Gap, Calculation 5 - 18 mmol/L 9   Glucose 70 - 125 mg/dL 94   Calcium 8.5 - 10.5 mg/dL 9.0   BUN             8 - 22 mg/dL 18   Creatinine 0.60 - 1.10 mg/dL 0.63   GFR MDRD Non Af Amer >60 mL/min/1.73m2 >60   Resulting Agency  [facility]

## 2019-04-18 ENCOUNTER — OFFICE VISIT (OUTPATIENT)
Dept: GASTROENTEROLOGY | Facility: CLINIC | Age: 59
End: 2019-04-18
Attending: INTERNAL MEDICINE
Payer: COMMERCIAL

## 2019-04-18 VITALS
BODY MASS INDEX: 37.89 KG/M2 | HEART RATE: 79 BPM | TEMPERATURE: 98.1 F | WEIGHT: 205.9 LBS | HEIGHT: 62 IN | DIASTOLIC BLOOD PRESSURE: 68 MMHG | OXYGEN SATURATION: 100 % | SYSTOLIC BLOOD PRESSURE: 107 MMHG

## 2019-04-18 DIAGNOSIS — K75.81 LIVER CIRRHOSIS SECONDARY TO NASH (H): ICD-10-CM

## 2019-04-18 DIAGNOSIS — Z12.9 SCREENING FOR CANCER: ICD-10-CM

## 2019-04-18 DIAGNOSIS — E66.01 CLASS 3 SEVERE OBESITY DUE TO EXCESS CALORIES WITH SERIOUS COMORBIDITY IN ADULT, UNSPECIFIED BMI (H): ICD-10-CM

## 2019-04-18 DIAGNOSIS — K76.82 HEPATIC ENCEPHALOPATHY (H): Primary | ICD-10-CM

## 2019-04-18 DIAGNOSIS — E66.813 CLASS 3 SEVERE OBESITY DUE TO EXCESS CALORIES WITH SERIOUS COMORBIDITY IN ADULT, UNSPECIFIED BMI (H): ICD-10-CM

## 2019-04-18 DIAGNOSIS — E88.01 HETEROZYGOUS ALPHA 1-ANTITRYPSIN DEFICIENCY (H): ICD-10-CM

## 2019-04-18 DIAGNOSIS — R60.9 EDEMA, UNSPECIFIED TYPE: ICD-10-CM

## 2019-04-18 DIAGNOSIS — I85.10 SECONDARY ESOPHAGEAL VARICES WITHOUT BLEEDING (H): ICD-10-CM

## 2019-04-18 DIAGNOSIS — K74.60 LIVER CIRRHOSIS SECONDARY TO NASH (H): ICD-10-CM

## 2019-04-18 DIAGNOSIS — R60.0 PERIPHERAL EDEMA: ICD-10-CM

## 2019-04-18 LAB
ALBUMIN SERPL-MCNC: 2.6 G/DL (ref 3.4–5)
ALP SERPL-CCNC: 187 U/L (ref 40–150)
ALT SERPL W P-5'-P-CCNC: 58 U/L (ref 0–50)
ANION GAP SERPL CALCULATED.3IONS-SCNC: 7 MMOL/L (ref 3–14)
AST SERPL W P-5'-P-CCNC: 78 U/L (ref 0–45)
BILIRUB DIRECT SERPL-MCNC: 2.8 MG/DL (ref 0–0.2)
BILIRUB SERPL-MCNC: 6.2 MG/DL (ref 0.2–1.3)
BUN SERPL-MCNC: 15 MG/DL (ref 7–30)
CALCIUM SERPL-MCNC: 8.7 MG/DL (ref 8.5–10.1)
CHLORIDE SERPL-SCNC: 98 MMOL/L (ref 94–109)
CO2 SERPL-SCNC: 27 MMOL/L (ref 20–32)
CREAT SERPL-MCNC: 0.54 MG/DL (ref 0.52–1.04)
ERYTHROCYTE [DISTWIDTH] IN BLOOD BY AUTOMATED COUNT: 13.2 % (ref 10–15)
GFR SERPL CREATININE-BSD FRML MDRD: >90 ML/MIN/{1.73_M2}
GLUCOSE SERPL-MCNC: 72 MG/DL (ref 70–99)
HCT VFR BLD AUTO: 38.4 % (ref 35–47)
HCV AB SERPL QL IA: NONREACTIVE
HGB BLD-MCNC: 13.4 G/DL (ref 11.7–15.7)
INR PPP: 1.95 (ref 0.86–1.14)
MCH RBC QN AUTO: 40.1 PG (ref 26.5–33)
MCHC RBC AUTO-ENTMCNC: 34.9 G/DL (ref 31.5–36.5)
MCV RBC AUTO: 115 FL (ref 78–100)
PLATELET # BLD AUTO: 58 10E9/L (ref 150–450)
POTASSIUM SERPL-SCNC: 4.1 MMOL/L (ref 3.4–5.3)
PROT SERPL-MCNC: 6.8 G/DL (ref 6.8–8.8)
RBC # BLD AUTO: 3.34 10E12/L (ref 3.8–5.2)
SODIUM SERPL-SCNC: 132 MMOL/L (ref 133–144)
WBC # BLD AUTO: 6.7 10E9/L (ref 4–11)

## 2019-04-18 PROCEDURE — 80076 HEPATIC FUNCTION PANEL: CPT | Performed by: INTERNAL MEDICINE

## 2019-04-18 PROCEDURE — 85027 COMPLETE CBC AUTOMATED: CPT | Performed by: INTERNAL MEDICINE

## 2019-04-18 PROCEDURE — 80048 BASIC METABOLIC PNL TOTAL CA: CPT | Performed by: INTERNAL MEDICINE

## 2019-04-18 PROCEDURE — 85610 PROTHROMBIN TIME: CPT | Performed by: INTERNAL MEDICINE

## 2019-04-18 PROCEDURE — 36415 COLL VENOUS BLD VENIPUNCTURE: CPT | Performed by: INTERNAL MEDICINE

## 2019-04-18 PROCEDURE — 86803 HEPATITIS C AB TEST: CPT | Performed by: INTERNAL MEDICINE

## 2019-04-18 PROCEDURE — G0463 HOSPITAL OUTPT CLINIC VISIT: HCPCS | Mod: ZF

## 2019-04-18 RX ORDER — CEPHALEXIN 500 MG/1
500 CAPSULE ORAL
Status: ON HOLD | COMMUNITY
Start: 2019-04-14 | End: 2019-08-16

## 2019-04-18 ASSESSMENT — MIFFLIN-ST. JEOR: SCORE: 1467.21

## 2019-04-18 ASSESSMENT — PAIN SCALES - GENERAL: PAINLEVEL: NO PAIN (0)

## 2019-04-18 NOTE — NURSING NOTE
"Chief Complaint   Patient presents with     RECHECK     liver cirrhosis secondary to ANGULO     /68   Pulse 79   Temp 98.1  F (36.7  C) (Oral)   Ht 1.575 m (5' 2\")   Wt 93.4 kg (205 lb 14.4 oz)   SpO2 100%   BMI 37.66 kg/m    Yari Lewis CMA    "

## 2019-04-18 NOTE — LETTER
"4/18/2019      RE: Nicci Pemberton  [address] [facility] [address]       Date of Service: 2/19/2019     Subjective:            Nicci Pemberton is a 58 year old female presenting for evaluation of liver disease    History of Present Illness   Nicci Pemberton is a 58 year old female with past medical history of obesity, lymphedema, and cirrhosis secondary to combination of nonalcoholic fatty liver disease, iron overload, and alpha-1 antitrypsin MZ phenotype who presents for routine follow-up.  Of note she is actively listed for liver transplant at [facility].    Since last seen she has had several issues.  She was started on diuretic therapy with Bumex and continued on spironolactone.  As her creatinine has remained stable we have slowly up titrated to Bumex 1 mg daily which she appears to be tolerating well.  She notes dramatic improvement in her lower extremity edema and also notices a decrease in her bowel movements, now having approximately 3 daily.  She feels better overall from a mobility perspective.  She has had some issues with abdominal pain and discomfort associated with some bloating, which lasted approximately 1 week and appears to have resolved after initiation of omeprazole.    She presented to an emergency department last week with concerns of altered mental status, impaired concentration, question of hallucination, and severe tremors.  While we do not have the records for this presentation she does report she was informed she had a \"elevated ammonia level\".  She was started on Keflex for unclear reasons.  She has not had any issues since.    She has many questions during clinic visit regarding her liver disease, symptoms that she is experiencing, and long-term options.    Past Medical History:  Past Medical History:   Diagnosis Date     Anemia      Cellulitis      Cirrhosis of liver (H) 6/18/2018     Heterozygous alpha 1-antitrypsin deficiency (H)      High hepatic iron " concentration determined by biopsy of liver      Liver cirrhosis secondary to ANGULO (H)      Lymphedema      Osteoarthritis        Surgical History:  Past Surgical History:   Procedure Laterality Date     COLONOSCOPY N/A 2018    Procedure: COLONOSCOPY;  colonoscopy;  Surgeon: Hugo Patel MD;  Location:  OR       Social History:  Social History     Socioeconomic History     Marital status:      Spouse name: Not on file     Number of children: Not on file     Years of education: Not on file     Highest education level: Not on file   Occupational History     Not on file   Social Needs     Financial resource strain: Not on file     Food insecurity:     Worry: Not on file     Inability: Not on file     Transportation needs:     Medical: Not on file     Non-medical: Not on file   Tobacco Use     Smoking status: Former Smoker     Last attempt to quit:      Years since quittin.2     Smokeless tobacco: Never Used     Tobacco comment: weekend smoker    Substance and Sexual Activity     Alcohol use: No     Drug use: No     Sexual activity: Not on file   Lifestyle     Physical activity:     Days per week: Not on file     Minutes per session: Not on file     Stress: Not on file   Relationships     Social connections:     Talks on phone: Not on file     Gets together: Not on file     Attends Zoroastrian service: Not on file     Active member of club or organization: Not on file     Attends meetings of clubs or organizations: Not on file     Relationship status: Not on file     Intimate partner violence:     Fear of current or ex partner: Not on file     Emotionally abused: Not on file     Physically abused: Not on file     Forced sexual activity: Not on file   Other Topics Concern     Parent/sibling w/ CABG, MI or angioplasty before 65F 55M? Not Asked   Social History Narrative     Not on file       Family History:  Family History   Problem Relation Age of Onset     Cirrhosis No family hx of      Liver  "Cancer No family hx of             Medications:  Current Outpatient Medications   Medication     bumetanide (BUMEX) 1 MG tablet     cephALEXin (KEFLEX) 500 MG capsule     Cholecalciferol (VITAMIN D-3) 1000 units CAPS     levothyroxine (SYNTHROID/LEVOTHROID) 125 MCG tablet     magnesium oxide (MAG-OX) 400 MG tablet     omeprazole (PRILOSEC) 20 MG DR capsule     rifaximin (XIFAXAN) 550 MG TABS tablet     spironolactone (ALDACTONE) 100 MG tablet     diclofenac (VOLTAREN) 1 % GEL topical gel     No current facility-administered medications for this visit.        Review of Systems  A complete 10 point review of systems was asked and answered in the negative unless specifically commented upon in the HPI    Objective:           Vitals:    04/18/19 0915   BP: 107/68   Pulse: 79   Temp: 98.1  F (36.7  C)   TempSrc: Oral   SpO2: 100%   Weight: 93.4 kg (205 lb 14.4 oz)   Height: 1.575 m (5' 2\")     Body mass index is 37.66 kg/m .     Physical Exam    Vitals reviewed.   Constitutional: Well-developed, well-nourished, in no apparent distress.    HEENT: Normocephalic. + scleral icterus. Moist oral mucosa. Dentition normal  Neck/Lymph: Normal ROM, supple. No thyromegaly.  No lymphadenopathy  Cardiac:  Regular rate and rhythm.  No overt murmurs  Respiratory: Clear to auscultation bilaterally.  No wheezes or rales  GI:  Abdomen soft, non-distended, non-tender. BS present. No shifting dullness. Moderate obesity  Skin:  Skin is warm and dry. No rash noted.  + jaundice. + spider nevi noted.  + palmar erythema  Peripheral Vascular: 2+ b/l lower extremity edema. 2+ pulses in all extremities  Musculoskeletal:  ROM intact, normal muscle bulk    Psychiatric: Normal mood and affect. Behavior is normal.  Neuro:  No asterixis, No tremor    Labs and Diagnostic tests:  Lab Results   Component Value Date     04/18/2019    POTASSIUM 4.1 04/18/2019    CHLORIDE 98 04/18/2019    CO2 27 04/18/2019    BUN 15 04/18/2019    CR 0.54 04/18/2019 "     Lab Results   Component Value Date    BILITOTAL 6.2 04/18/2019    ALT 58 04/18/2019    AST 78 04/18/2019    ALKPHOS 187 04/18/2019     Lab Results   Component Value Date    ALBUMIN 2.6 04/18/2019    PROTTOTAL 6.8 04/18/2019      Lab Results   Component Value Date    WBC 6.7 04/18/2019    HGB 13.4 04/18/2019     04/18/2019    PLT 58 04/18/2019     Lab Results   Component Value Date    INR 1.95 04/18/2019       MELD-Na score: 24 at 4/18/2019  9:01 AM  MELD score: 21 at 4/18/2019  9:01 AM  Calculated from:  Serum Creatinine: 0.54 mg/dL (Rounded to 1 mg/dL) at 4/18/2019  9:01 AM  Serum Sodium: 132 mmol/L at 4/18/2019  9:01 AM  Total Bilirubin: 6.2 mg/dL at 4/18/2019  9:01 AM  INR(ratio): 1.95 at 4/18/2019  9:01 AM  Age: 58 years      Imaging:  MRI Abd w/wo contrast 6/27/2018  FINDINGS:  Biliary Tree: No intrahepatic or extrahepatic biliary dilation.  Pancreas: Mild volume loss and loss of T1 signal. Small cystic lesions, largest in the body measuring 6 mm. None have suspicious nodularity. Pancreatic duct is normal in caliber.  Liver: Substantial iron deposition in the liver. Numerous siderotic nodules throughout the liver parenchyma. Surface nodularity suggestive of cirrhosis. No suspicious arterially enhancing lesion. No diffusion  restriction.  Gallbladder: Unremarkable.  Spleen: Normal.  Kidneys: Normal.  Adrenal glands: Normal.  Bowel: Unremarkable.  Lymph nodes: Unremarkable.  Blood vessels: Upper abdominal varices. Otherwise unremarkable.  Lung bases: Clear.  Bones and soft tissues: Unremarkable.  Mesentery and abdominal wall: Unremarkable.  Ascites: Trace.    Abd US 3/20/2019  Findings:  Liver:  Coarse heterogeneous liver parenchyma with nodular contours and  irregular surface. No focal liver lesions. Main portal vein measures  0.7 cm. The main portal vein is patent with antegrade flow.  Portosystemic collateral vessels at the left upper quadrant.  US visualization score: A - No or minimal  limitations  Gallbladder: Distended gallbladder. Gallbladder wall is thickened measuring 9 mm, edematous with no hyperemia. Negative sonographic  Fuller's sign. Biliary sludge and small stones within the gallbladder.  Bile Ducts: Both the intra- and extrahepatic biliary system are of normal caliber. The common bile duct measures 5 mm in diameter.  Pancreas: Visualized portions of the head and body of the pancreas are  unremarkable.   Kidneys: Both kidneys are of normal echotexture, without mass nor  hydronephrosis.  The craniocaudal dimensions are: right- 9.9 cm, left-  11.8 cm.  Spleen: Borderline enlarged spleen measuring 13.3 cm in sagittal dimension.  Aorta and IVC: The visualized portions of the aorta and IVC are  unremarkable. The aorta measures 2.7 cm in diameter and the IVC  measures 1.5 cm in diameter.  Fluid: No pleural effusions. Small volume of ascites, perihepatic.      Procedures:  EGD: 4/13/18  - normal    Colonoscopy: 6/22/2018  Findings:        The perianal and digital rectal examinations were normal.        The terminal ileum appeared normal.        A few medium-mouthed diverticula were found in the sigmoid colon.        No additional abnormalities were found on retroflexion.                                                                    Assessment and Plan:    Cirrhosis:    -Secondary to combination of nonalcoholic fatty liver disease, iron overload (secondary), and is a an alpha-1 antitrypsin heterozygote.  -We continue to discuss the need to work on weight loss as able and remain physically active in order to help control the degree of fatty liver deposition  -Current meld score being 24  -She is to continue with MELD labs per protocol    Liver Transplant Candidacy:   She is actively listed for liver transplant  - She has blood type A  - At last visit We specifically discussed the potential for use of organs that may test positive for either hepatitis B or hepatitis C infections.  We clearly  explained that the organ quality would be thoroughly reviewed prior to accepting for transplant, and we discussed the management of both hepatitis B and hepatitis C in the post transplant setting.   -After weighing the potential benefits and risks of receiving organs from these particular donors, the patient verbalized to both me and transplant RN that she would be willing to accept organs from patients exposed to hepatitis B or hepatitis C    Hepatocellular Cancer Screening:   -Abdominal ultrasound from March 2019 did not demonstrate any overt hepatic masses  -We will plan to obtain repeat imaging in September 2019  - Recommend screening for HCC every 6 months with either abdominal ultrasound or by alternating abdominal ultrasound with EITHER a triple/quad phase CT Liver with IV contrast OR a Quad phase MRI Liver with IV contrast.  AFP levels should be checked every 6 months at time of imaging screen.    Ascites:  -She does have issues with volume overload, particularly lymphedema.    -She is tolerating Bumex 1 mg daily and spironolactone 200 mg daily  -With this regimen she has had very overt improvement in her bilateral lower extremity edema  - Continue to follow a sodium restricted (<2g sodium diet)     Hepatic Encephalopathy:  -Concern with symptoms she was describing of altered mentation, inability to think clearly, impaired concentration, question of hallucination or her first episode of hepatic encephalopathy  -As she has chronic diarrhea at baseline with at least 3 bowel movements daily, not felt she will have significant benefit from medication like lactulose  -We will prescribe rifaximin 550 mg twice daily and assess for improvement in symptoms    Esophageal Varices:   - Last EGD 4/3/2018 was normal  - Recommend repeating an upper GI endoscopy in April 2020 .  If evidence of medium/large esophageal varices, would recommend esophageal varix band ligation or the initiation of a non-selective beta-blocker  (carvedilol or propranolol).  If band ligation is performed, please continue to repeat until eraditation of varices.      Nutrition:  As with most patients with chronic liver disease, there is a significant degree of protein malnutrition.  Dicussed need to change dietary habits to improve overall protein balance.  Discussed the importance of eating between 1.2-1.5g/kg/day lean protein like eggs, fish, chicken, nuts, and legumes, in addition to a diet rich in fresh fruits and vegetables.  Continue to follow a sodium restricted (<2g sodium diet) and discussed the need to minimize the intake of carbohydrates and sugars, to avoid obesity.   - Strongly encourage protein supplements 2-3 times daily (Boost, Ensure, [place] Instant Milk, etc.) to meet protein and caloric intake.  - Recommend a bedtime snack with protein and complex carbohydrate to minimize risk of muscle catabolism overnight    Routine Health Care in Patient with Chronic Liver Disease:  - We recommend screening for hepatitis A and B, please vaccinate if not immune  - All patients with liver disease, particularly those with cholestatic liver disease, are at an increased risk for osteoporosis.  We strongly recommend screening for Vitamin D deficiency at least twice yearly with aggressive supplementation/replacement as indicated.    - We also recommend a screening DEXA scan to evaluate for osteoporosis.  If present, should treat with calcium, Vitamin D supplementation, and recommend consideration of bisphosphonate therapy.  Also recommend follow up DEXA scans to evaluate for improvement of bone density on therapy.  - All patients with liver disease should avoid the use of Non-steroidal Anti-Inflammatory (NSAID) medications as they can cause significant injury to the kidneys in this population    Follow Up:  August 2019     Thank you very much for the opportunity to participate in the care of this patient.  If you have any further questions, please don't  hesitate to contact our office.    Thomas M. Leventhal, M.D.   of Medicine  Advanced & Transplant Hepatology  [facility] [place]      Thomas M. Leventhal, MD

## 2019-04-18 NOTE — LETTER
"4/18/2019       RE: Nicci Pemberton  [address] [facility] [address]     Dear Colleague,    Thank you for referring your patient, Nicci Pemberton, to the [facility] HEPATOLOGY at [facility]. Please see a copy of my visit note below.    Date of Service: 2/19/2019     Subjective:            Nicci Pemberton is a 58 year old female presenting for evaluation of liver disease    History of Present Illness   Nicci Pemberton is a 58 year old female with past medical history of obesity, lymphedema, and cirrhosis secondary to combination of nonalcoholic fatty liver disease, iron overload, and alpha-1 antitrypsin MZ phenotype who presents for routine follow-up.  Of note she is actively listed for liver transplant at [facility].    Since last seen she has had several issues.  She was started on diuretic therapy with Bumex and continued on spironolactone.  As her creatinine has remained stable we have slowly up titrated to Bumex 1 mg daily which she appears to be tolerating well.  She notes dramatic improvement in her lower extremity edema and also notices a decrease in her bowel movements, now having approximately 3 daily.  She feels better overall from a mobility perspective.  She has had some issues with abdominal pain and discomfort associated with some bloating, which lasted approximately 1 week and appears to have resolved after initiation of omeprazole.    She presented to an emergency department last week with concerns of altered mental status, impaired concentration, question of hallucination, and severe tremors.  While we do not have the records for this presentation she does report she was informed she had a \"elevated ammonia level\".  She was started on Keflex for unclear reasons.  She has not had any issues since.    She has many questions during clinic visit regarding her liver disease, symptoms that she is experiencing, and long-term options.    Past Medical " History:  Past Medical History:   Diagnosis Date     Anemia      Cellulitis      Cirrhosis of liver (H) 2018     Heterozygous alpha 1-antitrypsin deficiency (H)      High hepatic iron concentration determined by biopsy of liver      Liver cirrhosis secondary to ANGULO (H)      Lymphedema      Osteoarthritis        Surgical History:  Past Surgical History:   Procedure Laterality Date     COLONOSCOPY N/A 2018    Procedure: COLONOSCOPY;  colonoscopy;  Surgeon: Hugo Patel MD;  Location:  OR       Social History:  Social History     Socioeconomic History     Marital status:      Spouse name: Not on file     Number of children: Not on file     Years of education: Not on file     Highest education level: Not on file   Occupational History     Not on file   Social Needs     Financial resource strain: Not on file     Food insecurity:     Worry: Not on file     Inability: Not on file     Transportation needs:     Medical: Not on file     Non-medical: Not on file   Tobacco Use     Smoking status: Former Smoker     Last attempt to quit: 2011     Years since quittin.2     Smokeless tobacco: Never Used     Tobacco comment: weekend smoker    Substance and Sexual Activity     Alcohol use: No     Drug use: No     Sexual activity: Not on file   Lifestyle     Physical activity:     Days per week: Not on file     Minutes per session: Not on file     Stress: Not on file   Relationships     Social connections:     Talks on phone: Not on file     Gets together: Not on file     Attends Anabaptism service: Not on file     Active member of club or organization: Not on file     Attends meetings of clubs or organizations: Not on file     Relationship status: Not on file     Intimate partner violence:     Fear of current or ex partner: Not on file     Emotionally abused: Not on file     Physically abused: Not on file     Forced sexual activity: Not on file   Other Topics Concern     Parent/sibling w/ CABG, MI or  "angioplasty before 65F 55M? Not Asked   Social History Narrative     Not on file       Family History:  Family History   Problem Relation Age of Onset     Cirrhosis No family hx of      Liver Cancer No family hx of             Medications:  Current Outpatient Medications   Medication     bumetanide (BUMEX) 1 MG tablet     cephALEXin (KEFLEX) 500 MG capsule     Cholecalciferol (VITAMIN D-3) 1000 units CAPS     levothyroxine (SYNTHROID/LEVOTHROID) 125 MCG tablet     magnesium oxide (MAG-OX) 400 MG tablet     omeprazole (PRILOSEC) 20 MG DR capsule     rifaximin (XIFAXAN) 550 MG TABS tablet     spironolactone (ALDACTONE) 100 MG tablet     diclofenac (VOLTAREN) 1 % GEL topical gel     No current facility-administered medications for this visit.        Review of Systems  A complete 10 point review of systems was asked and answered in the negative unless specifically commented upon in the HPI    Objective:           Vitals:    04/18/19 0915   BP: 107/68   Pulse: 79   Temp: 98.1  F (36.7  C)   TempSrc: Oral   SpO2: 100%   Weight: 93.4 kg (205 lb 14.4 oz)   Height: 1.575 m (5' 2\")     Body mass index is 37.66 kg/m .     Physical Exam    Vitals reviewed.   Constitutional: Well-developed, well-nourished, in no apparent distress.    HEENT: Normocephalic. + scleral icterus. Moist oral mucosa. Dentition normal  Neck/Lymph: Normal ROM, supple. No thyromegaly.  No lymphadenopathy  Cardiac:  Regular rate and rhythm.  No overt murmurs  Respiratory: Clear to auscultation bilaterally.  No wheezes or rales  GI:  Abdomen soft, non-distended, non-tender. BS present. No shifting dullness. Moderate obesity  Skin:  Skin is warm and dry. No rash noted.  + jaundice. + spider nevi noted.  + palmar erythema  Peripheral Vascular: 2+ b/l lower extremity edema. 2+ pulses in all extremities  Musculoskeletal:  ROM intact, normal muscle bulk    Psychiatric: Normal mood and affect. Behavior is normal.  Neuro:  No asterixis, No tremor    Labs and " Diagnostic tests:  Lab Results   Component Value Date     04/18/2019    POTASSIUM 4.1 04/18/2019    CHLORIDE 98 04/18/2019    CO2 27 04/18/2019    BUN 15 04/18/2019    CR 0.54 04/18/2019     Lab Results   Component Value Date    BILITOTAL 6.2 04/18/2019    ALT 58 04/18/2019    AST 78 04/18/2019    ALKPHOS 187 04/18/2019     Lab Results   Component Value Date    ALBUMIN 2.6 04/18/2019    PROTTOTAL 6.8 04/18/2019      Lab Results   Component Value Date    WBC 6.7 04/18/2019    HGB 13.4 04/18/2019     04/18/2019    PLT 58 04/18/2019     Lab Results   Component Value Date    INR 1.95 04/18/2019       MELD-Na score: 24 at 4/18/2019  9:01 AM  MELD score: 21 at 4/18/2019  9:01 AM  Calculated from:  Serum Creatinine: 0.54 mg/dL (Rounded to 1 mg/dL) at 4/18/2019  9:01 AM  Serum Sodium: 132 mmol/L at 4/18/2019  9:01 AM  Total Bilirubin: 6.2 mg/dL at 4/18/2019  9:01 AM  INR(ratio): 1.95 at 4/18/2019  9:01 AM  Age: 58 years      Imaging:  MRI Abd w/wo contrast 6/27/2018  FINDINGS:  Biliary Tree: No intrahepatic or extrahepatic biliary dilation.  Pancreas: Mild volume loss and loss of T1 signal. Small cystic lesions, largest in the body measuring 6 mm. None have suspicious nodularity. Pancreatic duct is normal in caliber.  Liver: Substantial iron deposition in the liver. Numerous siderotic nodules throughout the liver parenchyma. Surface nodularity suggestive of cirrhosis. No suspicious arterially enhancing lesion. No diffusion  restriction.  Gallbladder: Unremarkable.  Spleen: Normal.  Kidneys: Normal.  Adrenal glands: Normal.  Bowel: Unremarkable.  Lymph nodes: Unremarkable.  Blood vessels: Upper abdominal varices. Otherwise unremarkable.  Lung bases: Clear.  Bones and soft tissues: Unremarkable.  Mesentery and abdominal wall: Unremarkable.  Ascites: Trace.    Abd US 3/20/2019  Findings:  Liver:  Coarse heterogeneous liver parenchyma with nodular contours and  irregular surface. No focal liver lesions. Main portal  vein measures  0.7 cm. The main portal vein is patent with antegrade flow.  Portosystemic collateral vessels at the left upper quadrant.  US visualization score: A - No or minimal limitations  Gallbladder: Distended gallbladder. Gallbladder wall is thickened measuring 9 mm, edematous with no hyperemia. Negative sonographic  Fuller's sign. Biliary sludge and small stones within the gallbladder.  Bile Ducts: Both the intra- and extrahepatic biliary system are of normal caliber. The common bile duct measures 5 mm in diameter.  Pancreas: Visualized portions of the head and body of the pancreas are  unremarkable.   Kidneys: Both kidneys are of normal echotexture, without mass nor  hydronephrosis.  The craniocaudal dimensions are: right- 9.9 cm, left-  11.8 cm.  Spleen: Borderline enlarged spleen measuring 13.3 cm in sagittal dimension.  Aorta and IVC: The visualized portions of the aorta and IVC are  unremarkable. The aorta measures 2.7 cm in diameter and the IVC  measures 1.5 cm in diameter.  Fluid: No pleural effusions. Small volume of ascites, perihepatic.      Procedures:  EGD: 4/13/18  - normal    Colonoscopy: 6/22/2018  Findings:        The perianal and digital rectal examinations were normal.        The terminal ileum appeared normal.        A few medium-mouthed diverticula were found in the sigmoid colon.        No additional abnormalities were found on retroflexion.                                                                    Assessment and Plan:    Cirrhosis:    -Secondary to combination of nonalcoholic fatty liver disease, iron overload (secondary), and is a an alpha-1 antitrypsin heterozygote.  -We continue to discuss the need to work on weight loss as able and remain physically active in order to help control the degree of fatty liver deposition  -Current meld score being 24  -She is to continue with MELD labs per protocol    Liver Transplant Candidacy:   She is actively listed for liver transplant  -  She has blood type A  - At last visit We specifically discussed the potential for use of organs that may test positive for either hepatitis B or hepatitis C infections.  We clearly explained that the organ quality would be thoroughly reviewed prior to accepting for transplant, and we discussed the management of both hepatitis B and hepatitis C in the post transplant setting.   -After weighing the potential benefits and risks of receiving organs from these particular donors, the patient verbalized to both me and transplant RN that she would be willing to accept organs from patients exposed to hepatitis B or hepatitis C    Hepatocellular Cancer Screening:   -Abdominal ultrasound from March 2019 did not demonstrate any overt hepatic masses  -We will plan to obtain repeat imaging in September 2019  - Recommend screening for HCC every 6 months with either abdominal ultrasound or by alternating abdominal ultrasound with EITHER a triple/quad phase CT Liver with IV contrast OR a Quad phase MRI Liver with IV contrast.  AFP levels should be checked every 6 months at time of imaging screen.    Ascites:  -She does have issues with volume overload, particularly lymphedema.    -She is tolerating Bumex 1 mg daily and spironolactone 200 mg daily  -With this regimen she has had very overt improvement in her bilateral lower extremity edema  - Continue to follow a sodium restricted (<2g sodium diet)     Hepatic Encephalopathy:  -Concern with symptoms she was describing of altered mentation, inability to think clearly, impaired concentration, question of hallucination or her first episode of hepatic encephalopathy  -As she has chronic diarrhea at baseline with at least 3 bowel movements daily, not felt she will have significant benefit from medication like lactulose  -We will prescribe rifaximin 550 mg twice daily and assess for improvement in symptoms    Esophageal Varices:   - Last EGD 4/3/2018 was normal  - Recommend repeating an  upper GI endoscopy in April 2020 .  If evidence of medium/large esophageal varices, would recommend esophageal varix band ligation or the initiation of a non-selective beta-blocker (carvedilol or propranolol).  If band ligation is performed, please continue to repeat until eraditation of varices.      Nutrition:  As with most patients with chronic liver disease, there is a significant degree of protein malnutrition.  Dicussed need to change dietary habits to improve overall protein balance.  Discussed the importance of eating between 1.2-1.5g/kg/day lean protein like eggs, fish, chicken, nuts, and legumes, in addition to a diet rich in fresh fruits and vegetables.  Continue to follow a sodium restricted (<2g sodium diet) and discussed the need to minimize the intake of carbohydrates and sugars, to avoid obesity.   - Strongly encourage protein supplements 2-3 times daily (Boost, Ensure, [place] Instant Milk, etc.) to meet protein and caloric intake.  - Recommend a bedtime snack with protein and complex carbohydrate to minimize risk of muscle catabolism overnight    Routine Health Care in Patient with Chronic Liver Disease:  - We recommend screening for hepatitis A and B, please vaccinate if not immune  - All patients with liver disease, particularly those with cholestatic liver disease, are at an increased risk for osteoporosis.  We strongly recommend screening for Vitamin D deficiency at least twice yearly with aggressive supplementation/replacement as indicated.    - We also recommend a screening DEXA scan to evaluate for osteoporosis.  If present, should treat with calcium, Vitamin D supplementation, and recommend consideration of bisphosphonate therapy.  Also recommend follow up DEXA scans to evaluate for improvement of bone density on therapy.  - All patients with liver disease should avoid the use of Non-steroidal Anti-Inflammatory (NSAID) medications as they can cause significant injury to the kidneys in  this population    Follow Up:  August 2019     Thank you very much for the opportunity to participate in the care of this patient.  If you have any further questions, please don't hesitate to contact our office.    Thomas M. Leventhal, M.D.   of Medicine  Advanced & Transplant Hepatology  [facility]

## 2019-04-18 NOTE — PROGRESS NOTES
"Date of Service: 2/19/2019     Subjective:            Nicci Pemberton is a 58 year old female presenting for evaluation of liver disease    History of Present Illness   Nicci Pemberton is a 58 year old female with past medical history of obesity, lymphedema, and cirrhosis secondary to combination of nonalcoholic fatty liver disease, iron overload, and alpha-1 antitrypsin MZ phenotype who presents for routine follow-up.  Of note she is actively listed for liver transplant at [facility].    Since last seen she has had several issues.  She was started on diuretic therapy with Bumex and continued on spironolactone.  As her creatinine has remained stable we have slowly up titrated to Bumex 1 mg daily which she appears to be tolerating well.  She notes dramatic improvement in her lower extremity edema and also notices a decrease in her bowel movements, now having approximately 3 daily.  She feels better overall from a mobility perspective.  She has had some issues with abdominal pain and discomfort associated with some bloating, which lasted approximately 1 week and appears to have resolved after initiation of omeprazole.    She presented to an emergency department last week with concerns of altered mental status, impaired concentration, question of hallucination, and severe tremors.  While we do not have the records for this presentation she does report she was informed she had a \"elevated ammonia level\".  She was started on Keflex for unclear reasons.  She has not had any issues since.    She has many questions during clinic visit regarding her liver disease, symptoms that she is experiencing, and long-term options.    Past Medical History:  Past Medical History:   Diagnosis Date     Anemia      Cellulitis      Cirrhosis of liver (H) 6/18/2018     Heterozygous alpha 1-antitrypsin deficiency (H)      High hepatic iron concentration determined by biopsy of liver      Liver cirrhosis secondary to ANGULO (H)      " Lymphedema      Osteoarthritis        Surgical History:  Past Surgical History:   Procedure Laterality Date     COLONOSCOPY N/A 2018    Procedure: COLONOSCOPY;  colonoscopy;  Surgeon: Hugo Patel MD;  Location:  OR       Social History:  Social History     Socioeconomic History     Marital status:      Spouse name: Not on file     Number of children: Not on file     Years of education: Not on file     Highest education level: Not on file   Occupational History     Not on file   Social Needs     Financial resource strain: Not on file     Food insecurity:     Worry: Not on file     Inability: Not on file     Transportation needs:     Medical: Not on file     Non-medical: Not on file   Tobacco Use     Smoking status: Former Smoker     Last attempt to quit:      Years since quittin.2     Smokeless tobacco: Never Used     Tobacco comment: weekend smoker    Substance and Sexual Activity     Alcohol use: No     Drug use: No     Sexual activity: Not on file   Lifestyle     Physical activity:     Days per week: Not on file     Minutes per session: Not on file     Stress: Not on file   Relationships     Social connections:     Talks on phone: Not on file     Gets together: Not on file     Attends Christian service: Not on file     Active member of club or organization: Not on file     Attends meetings of clubs or organizations: Not on file     Relationship status: Not on file     Intimate partner violence:     Fear of current or ex partner: Not on file     Emotionally abused: Not on file     Physically abused: Not on file     Forced sexual activity: Not on file   Other Topics Concern     Parent/sibling w/ CABG, MI or angioplasty before 65F 55M? Not Asked   Social History Narrative     Not on file       Family History:  Family History   Problem Relation Age of Onset     Cirrhosis No family hx of      Liver Cancer No family hx of             Medications:  Current Outpatient Medications   Medication  "    bumetanide (BUMEX) 1 MG tablet     cephALEXin (KEFLEX) 500 MG capsule     Cholecalciferol (VITAMIN D-3) 1000 units CAPS     levothyroxine (SYNTHROID/LEVOTHROID) 125 MCG tablet     magnesium oxide (MAG-OX) 400 MG tablet     omeprazole (PRILOSEC) 20 MG DR capsule     rifaximin (XIFAXAN) 550 MG TABS tablet     spironolactone (ALDACTONE) 100 MG tablet     diclofenac (VOLTAREN) 1 % GEL topical gel     No current facility-administered medications for this visit.        Review of Systems  A complete 10 point review of systems was asked and answered in the negative unless specifically commented upon in the HPI    Objective:           Vitals:    04/18/19 0915   BP: 107/68   Pulse: 79   Temp: 98.1  F (36.7  C)   TempSrc: Oral   SpO2: 100%   Weight: 93.4 kg (205 lb 14.4 oz)   Height: 1.575 m (5' 2\")     Body mass index is 37.66 kg/m .     Physical Exam    Vitals reviewed.   Constitutional: Well-developed, well-nourished, in no apparent distress.    HEENT: Normocephalic. + scleral icterus. Moist oral mucosa. Dentition normal  Neck/Lymph: Normal ROM, supple. No thyromegaly.  No lymphadenopathy  Cardiac:  Regular rate and rhythm.  No overt murmurs  Respiratory: Clear to auscultation bilaterally.  No wheezes or rales  GI:  Abdomen soft, non-distended, non-tender. BS present. No shifting dullness. Moderate obesity  Skin:  Skin is warm and dry. No rash noted.  + jaundice. + spider nevi noted.  + palmar erythema  Peripheral Vascular: 2+ b/l lower extremity edema. 2+ pulses in all extremities  Musculoskeletal:  ROM intact, normal muscle bulk    Psychiatric: Normal mood and affect. Behavior is normal.  Neuro:  No asterixis, No tremor    Labs and Diagnostic tests:  Lab Results   Component Value Date     04/18/2019    POTASSIUM 4.1 04/18/2019    CHLORIDE 98 04/18/2019    CO2 27 04/18/2019    BUN 15 04/18/2019    CR 0.54 04/18/2019     Lab Results   Component Value Date    BILITOTAL 6.2 04/18/2019    ALT 58 04/18/2019    AST 78 " 04/18/2019    ALKPHOS 187 04/18/2019     Lab Results   Component Value Date    ALBUMIN 2.6 04/18/2019    PROTTOTAL 6.8 04/18/2019      Lab Results   Component Value Date    WBC 6.7 04/18/2019    HGB 13.4 04/18/2019     04/18/2019    PLT 58 04/18/2019     Lab Results   Component Value Date    INR 1.95 04/18/2019       MELD-Na score: 24 at 4/18/2019  9:01 AM  MELD score: 21 at 4/18/2019  9:01 AM  Calculated from:  Serum Creatinine: 0.54 mg/dL (Rounded to 1 mg/dL) at 4/18/2019  9:01 AM  Serum Sodium: 132 mmol/L at 4/18/2019  9:01 AM  Total Bilirubin: 6.2 mg/dL at 4/18/2019  9:01 AM  INR(ratio): 1.95 at 4/18/2019  9:01 AM  Age: 58 years      Imaging:  MRI Abd w/wo contrast 6/27/2018  FINDINGS:  Biliary Tree: No intrahepatic or extrahepatic biliary dilation.  Pancreas: Mild volume loss and loss of T1 signal. Small cystic lesions, largest in the body measuring 6 mm. None have suspicious nodularity. Pancreatic duct is normal in caliber.  Liver: Substantial iron deposition in the liver. Numerous siderotic nodules throughout the liver parenchyma. Surface nodularity suggestive of cirrhosis. No suspicious arterially enhancing lesion. No diffusion  restriction.  Gallbladder: Unremarkable.  Spleen: Normal.  Kidneys: Normal.  Adrenal glands: Normal.  Bowel: Unremarkable.  Lymph nodes: Unremarkable.  Blood vessels: Upper abdominal varices. Otherwise unremarkable.  Lung bases: Clear.  Bones and soft tissues: Unremarkable.  Mesentery and abdominal wall: Unremarkable.  Ascites: Trace.    Abd US 3/20/2019  Findings:  Liver:  Coarse heterogeneous liver parenchyma with nodular contours and  irregular surface. No focal liver lesions. Main portal vein measures  0.7 cm. The main portal vein is patent with antegrade flow.  Portosystemic collateral vessels at the left upper quadrant.  US visualization score: A - No or minimal limitations  Gallbladder: Distended gallbladder. Gallbladder wall is thickened measuring 9 mm, edematous with  no hyperemia. Negative sonographic  Fuller's sign. Biliary sludge and small stones within the gallbladder.  Bile Ducts: Both the intra- and extrahepatic biliary system are of normal caliber. The common bile duct measures 5 mm in diameter.  Pancreas: Visualized portions of the head and body of the pancreas are  unremarkable.   Kidneys: Both kidneys are of normal echotexture, without mass nor  hydronephrosis.  The craniocaudal dimensions are: right- 9.9 cm, left-  11.8 cm.  Spleen: Borderline enlarged spleen measuring 13.3 cm in sagittal dimension.  Aorta and IVC: The visualized portions of the aorta and IVC are  unremarkable. The aorta measures 2.7 cm in diameter and the IVC  measures 1.5 cm in diameter.  Fluid: No pleural effusions. Small volume of ascites, perihepatic.      Procedures:  EGD: 4/13/18  - normal    Colonoscopy: 6/22/2018  Findings:        The perianal and digital rectal examinations were normal.        The terminal ileum appeared normal.        A few medium-mouthed diverticula were found in the sigmoid colon.        No additional abnormalities were found on retroflexion.                                                                    Assessment and Plan:    Cirrhosis:    -Secondary to combination of nonalcoholic fatty liver disease, iron overload (secondary), and is a an alpha-1 antitrypsin heterozygote.  -We continue to discuss the need to work on weight loss as able and remain physically active in order to help control the degree of fatty liver deposition  -Current meld score being 24  -She is to continue with MELD labs per protocol    Liver Transplant Candidacy:   She is actively listed for liver transplant  - She has blood type A  - At last visit We specifically discussed the potential for use of organs that may test positive for either hepatitis B or hepatitis C infections.  We clearly explained that the organ quality would be thoroughly reviewed prior to accepting for transplant, and we  discussed the management of both hepatitis B and hepatitis C in the post transplant setting.   -After weighing the potential benefits and risks of receiving organs from these particular donors, the patient verbalized to both me and transplant RN that she would be willing to accept organs from patients exposed to hepatitis B or hepatitis C    Hepatocellular Cancer Screening:   -Abdominal ultrasound from March 2019 did not demonstrate any overt hepatic masses  -We will plan to obtain repeat imaging in September 2019  - Recommend screening for HCC every 6 months with either abdominal ultrasound or by alternating abdominal ultrasound with EITHER a triple/quad phase CT Liver with IV contrast OR a Quad phase MRI Liver with IV contrast.  AFP levels should be checked every 6 months at time of imaging screen.    Ascites:  -She does have issues with volume overload, particularly lymphedema.    -She is tolerating Bumex 1 mg daily and spironolactone 200 mg daily  -With this regimen she has had very overt improvement in her bilateral lower extremity edema  - Continue to follow a sodium restricted (<2g sodium diet)     Hepatic Encephalopathy:  -Concern with symptoms she was describing of altered mentation, inability to think clearly, impaired concentration, question of hallucination or her first episode of hepatic encephalopathy  -As she has chronic diarrhea at baseline with at least 3 bowel movements daily, not felt she will have significant benefit from medication like lactulose  -We will prescribe rifaximin 550 mg twice daily and assess for improvement in symptoms    Esophageal Varices:   - Last EGD 4/3/2018 was normal  - Recommend repeating an upper GI endoscopy in April 2020 .  If evidence of medium/large esophageal varices, would recommend esophageal varix band ligation or the initiation of a non-selective beta-blocker (carvedilol or propranolol).  If band ligation is performed, please continue to repeat until eraditation  of varices.      Nutrition:  As with most patients with chronic liver disease, there is a significant degree of protein malnutrition.  Dicussed need to change dietary habits to improve overall protein balance.  Discussed the importance of eating between 1.2-1.5g/kg/day lean protein like eggs, fish, chicken, nuts, and legumes, in addition to a diet rich in fresh fruits and vegetables.  Continue to follow a sodium restricted (<2g sodium diet) and discussed the need to minimize the intake of carbohydrates and sugars, to avoid obesity.   - Strongly encourage protein supplements 2-3 times daily (Boost, Ensure, Jal Instant Milk, etc.) to meet protein and caloric intake.  - Recommend a bedtime snack with protein and complex carbohydrate to minimize risk of muscle catabolism overnight    Routine Health Care in Patient with Chronic Liver Disease:  - We recommend screening for hepatitis A and B, please vaccinate if not immune  - All patients with liver disease, particularly those with cholestatic liver disease, are at an increased risk for osteoporosis.  We strongly recommend screening for Vitamin D deficiency at least twice yearly with aggressive supplementation/replacement as indicated.    - We also recommend a screening DEXA scan to evaluate for osteoporosis.  If present, should treat with calcium, Vitamin D supplementation, and recommend consideration of bisphosphonate therapy.  Also recommend follow up DEXA scans to evaluate for improvement of bone density on therapy.  - All patients with liver disease should avoid the use of Non-steroidal Anti-Inflammatory (NSAID) medications as they can cause significant injury to the kidneys in this population    Follow Up:  August 2019     Thank you very much for the opportunity to participate in the care of this patient.  If you have any further questions, please don't hesitate to contact our office.    Thomas M. Leventhal, M.D.   of Medicine  Advanced &  Transplant Hepatology  [facility]

## 2019-04-19 ENCOUNTER — TELEPHONE (OUTPATIENT)
Dept: GASTROENTEROLOGY | Facility: CLINIC | Age: 59
End: 2019-04-19

## 2019-04-19 NOTE — TELEPHONE ENCOUNTER
4/22/19-Call back to patient with input from Dr. Leventhal as stated below. Patient will  lactulose today. All questions answered.     Eleonora Carney LPN  Hepatology Clinic  ------  Leventhal, Thomas Michael, MD Beckenbach, Eleonora, LPN    I am 100% fine with the lactulose. She was having diarrhea in the past, so I was hesitant.     TL     ------  Patient would like to try lactulose alone before rifaximin. She already has a prescription from her PCP to take 2-6 tsp daily with goal of 3-4 BM's per day. She just wanted to run it by you first. Rifaximin is covered by her insurance if lactulose doesn't work out. She said she has not had any issue with loose stools in a long time  If anything it is the opposite. Message sent to Dr. Leventhal.    Eleonora Carney LPN  Hepatology Clinic      ----  Pt called to speak with Dr. Leventhal, as she has some questions about the medication he prescribed.  Please have Dr. Leventhal follow up with pt.  Thank you!

## 2019-05-13 DIAGNOSIS — K74.60 CIRRHOSIS OF LIVER WITHOUT ASCITES, UNSPECIFIED HEPATIC CIRRHOSIS TYPE (H): Primary | ICD-10-CM

## 2019-05-16 DIAGNOSIS — K74.60 CIRRHOSIS OF LIVER WITHOUT ASCITES, UNSPECIFIED HEPATIC CIRRHOSIS TYPE (H): ICD-10-CM

## 2019-05-16 LAB
ALBUMIN SERPL-MCNC: 2 G/DL (ref 3.4–5)
BILIRUB SERPL-MCNC: 6.6 MG/DL (ref 0.2–1.3)
CREAT SERPL-MCNC: 0.58 MG/DL (ref 0.52–1.04)
GFR SERPL CREATININE-BSD FRML MDRD: >90 ML/MIN/{1.73_M2}
INR PPP: 2.14 (ref 0.86–1.14)
SODIUM SERPL-SCNC: 133 MMOL/L (ref 133–144)

## 2019-05-20 ENCOUNTER — TELEPHONE (OUTPATIENT)
Dept: GASTROENTEROLOGY | Facility: CLINIC | Age: 59
End: 2019-05-20

## 2019-05-20 RX ORDER — FAMOTIDINE 20 MG/1
20 TABLET, FILM COATED ORAL 2 TIMES DAILY
Status: ON HOLD | COMMUNITY
End: 2019-09-23

## 2019-05-20 NOTE — TELEPHONE ENCOUNTER
INGRIS with input from Dr. Leventhal as stated below. Asked that patient call writer back with any questions regarding message, pt has writers direct number.    Eleonora Carney LPN  Hepatology Clinic        ----- Message from Thomas Michael Leventhal, MD sent at 5/20/2019 10:34 AM CDT -----  Regarding: RE: updates  The acid reflux treatment is fine.    She definitely shouldn't have any surgery, particularly on the abdomen, at a medical center other than the [facility]  ----- Message -----  From: Eleonora Carney LPN  Sent: 5/20/2019   8:57 AM  To: Thomas Michael Leventhal, MD  Subject: updates                                          Pt just wanted to update you on a couple of things.  Dr. Thurston started pt on famotidine 40  Mg at HS  for acid reflex. She was also diagnosised with a retractable hernia, plan is to monitor for now but if she needs surgery Dr. Thurston is affiliated with [facility] is it safe for her to have there or would it be better to have at [facility]?

## 2019-06-19 ENCOUNTER — DOCUMENTATION ONLY (OUTPATIENT)
Dept: TRANSPLANT | Facility: CLINIC | Age: 59
End: 2019-06-19

## 2019-06-19 DIAGNOSIS — K74.60 CIRRHOSIS OF LIVER (H): Primary | ICD-10-CM

## 2019-06-19 NOTE — Clinical Note
Hello, Please call pt to schedule her for her annual DSE and follow up with Txp Surg (she has seen Rocío in the past, but anyone okay if needed for scheduling). Pt aware. Let me know questions. Thanks, Monae

## 2019-06-19 NOTE — PROGRESS NOTES
Pt due for annual DSE, f/u with txp surgery and pap. Pt updated, pt to coordinate pap with PCP. Orders placed and message routed for scheduling. Per pt Shijiebanghart message sent summarizing above.

## 2019-06-20 DIAGNOSIS — K74.60 CIRRHOSIS OF LIVER WITHOUT ASCITES, UNSPECIFIED HEPATIC CIRRHOSIS TYPE (H): ICD-10-CM

## 2019-06-20 LAB
ALBUMIN SERPL-MCNC: 2.2 G/DL (ref 3.4–5)
BILIRUB SERPL-MCNC: 6.2 MG/DL (ref 0.2–1.3)
CREAT SERPL-MCNC: 0.62 MG/DL (ref 0.52–1.04)
GFR SERPL CREATININE-BSD FRML MDRD: >90 ML/MIN/{1.73_M2}
INR PPP: 2.01 (ref 0.86–1.14)
SODIUM SERPL-SCNC: 132 MMOL/L (ref 133–144)

## 2019-07-11 ENCOUNTER — HOSPITAL ENCOUNTER (OUTPATIENT)
Dept: CARDIOLOGY | Facility: CLINIC | Age: 59
Discharge: HOME | End: 2019-07-11
Attending: INTERNAL MEDICINE | Admitting: INTERNAL MEDICINE
Payer: COMMERCIAL

## 2019-07-11 ENCOUNTER — OFFICE VISIT (OUTPATIENT)
Dept: TRANSPLANT | Facility: CLINIC | Age: 59
End: 2019-07-11
Attending: INTERNAL MEDICINE
Payer: COMMERCIAL

## 2019-07-11 VITALS
DIASTOLIC BLOOD PRESSURE: 68 MMHG | SYSTOLIC BLOOD PRESSURE: 96 MMHG | HEART RATE: 73 BPM | WEIGHT: 222 LBS | BODY MASS INDEX: 40.6 KG/M2 | OXYGEN SATURATION: 100 %

## 2019-07-11 DIAGNOSIS — R29.898 MUSCLE FUNCTION LOSS: ICD-10-CM

## 2019-07-11 DIAGNOSIS — K74.69 OTHER CIRRHOSIS OF LIVER (H): ICD-10-CM

## 2019-07-11 DIAGNOSIS — I89.0 LYMPHEDEMA: ICD-10-CM

## 2019-07-11 DIAGNOSIS — K74.60 CIRRHOSIS OF LIVER (H): ICD-10-CM

## 2019-07-11 DIAGNOSIS — Z01.818 ENCOUNTER FOR PRE-TRANSPLANT EVALUATION FOR LIVER TRANSPLANT: Primary | ICD-10-CM

## 2019-07-11 PROCEDURE — 25000128 H RX IP 250 OP 636: Performed by: INTERNAL MEDICINE

## 2019-07-11 PROCEDURE — 25500064 ZZH RX 255 OP 636: Performed by: INTERNAL MEDICINE

## 2019-07-11 PROCEDURE — 40000264 ECHO STRESS ECHOCARDIOGRAM

## 2019-07-11 PROCEDURE — 25000125 ZZHC RX 250: Performed by: INTERNAL MEDICINE

## 2019-07-11 PROCEDURE — 93018 CV STRESS TEST I&R ONLY: CPT | Performed by: INTERNAL MEDICINE

## 2019-07-11 PROCEDURE — 93321 DOPPLER ECHO F-UP/LMTD STD: CPT | Mod: 26 | Performed by: INTERNAL MEDICINE

## 2019-07-11 PROCEDURE — 93350 STRESS TTE ONLY: CPT | Mod: 26 | Performed by: INTERNAL MEDICINE

## 2019-07-11 PROCEDURE — 93325 DOPPLER ECHO COLOR FLOW MAPG: CPT | Mod: 26 | Performed by: INTERNAL MEDICINE

## 2019-07-11 PROCEDURE — 93016 CV STRESS TEST SUPVJ ONLY: CPT | Performed by: INTERNAL MEDICINE

## 2019-07-11 RX ORDER — SODIUM CHLORIDE 9 MG/ML
INJECTION, SOLUTION INTRAVENOUS CONTINUOUS
Status: ACTIVE | OUTPATIENT
Start: 2019-07-11 | End: 2019-07-11

## 2019-07-11 RX ORDER — ATROPINE SULFATE 0.4 MG/ML
.2-2 AMPUL (ML) INJECTION
Status: COMPLETED | OUTPATIENT
Start: 2019-07-11 | End: 2019-07-11

## 2019-07-11 RX ORDER — METOPROLOL TARTRATE 1 MG/ML
1-20 INJECTION, SOLUTION INTRAVENOUS
Status: ACTIVE | OUTPATIENT
Start: 2019-07-11 | End: 2019-07-11

## 2019-07-11 RX ORDER — DOBUTAMINE HYDROCHLORIDE 200 MG/100ML
10-50 INJECTION INTRAVENOUS CONTINUOUS
Status: ACTIVE | OUTPATIENT
Start: 2019-07-11 | End: 2019-07-11

## 2019-07-11 RX ORDER — LACTULOSE 10 G/15ML
SOLUTION ORAL
Refills: 1 | Status: ON HOLD | COMMUNITY
Start: 2019-06-19 | End: 2019-08-16

## 2019-07-11 RX ADMIN — ATROPINE SULFATE 0.2 MG: 0.4 INJECTION, SOLUTION INTRAMUSCULAR; INTRAVENOUS; SUBCUTANEOUS at 11:28

## 2019-07-11 RX ADMIN — DOBUTAMINE HYDROCHLORIDE 10 MCG/KG/MIN: 200 INJECTION INTRAVENOUS at 11:20

## 2019-07-11 RX ADMIN — HUMAN ALBUMIN MICROSPHERES AND PERFLUTREN 9 ML: 10; .22 INJECTION, SOLUTION INTRAVENOUS at 11:36

## 2019-07-11 RX ADMIN — METOPROLOL TARTRATE 2 MG: 5 INJECTION INTRAVENOUS at 11:37

## 2019-07-11 NOTE — LETTER
7/11/2019       RE: Earlene Pemberton  [address] [facility] [address]     Dear Colleague,    Thank you for referring your patient, Earlene Pemberton, to the [facility] at [facility]. Please see a copy of my visit note below.    60 yo for year on waitlist visit.  NAGULO  MELD: 24 (kidneys too good)  Continues to progress, but slowly.  Has had a recent bout of PSE visit to ER.  Increased her lactulose.    Minimal ascites.  Weak, losing muscle.  No bleeding.    No diabetes or cardiac events.  Need to make sure cancer screens are up to date.    BP 96/68   Pulse 73   Wt 100.7 kg (222 lb)   SpO2 100%   BMI 40.60 kg/m     Abd soft, non tender   No ascites, no scars   Ext: 4+ lymphedema/swelling     Recent US: PV open, no masses.    Results for EARLENE PEMBERTON (MRN [id number]) as of 7/11/2019 11:05   Ref. Range 4/18/2019 09:01   Bilirubin Total Latest Ref Range: 0.2 - 1.3 mg/dL 6.2 (H)   Alkaline Phosphatase Latest Ref Range: 40 - 150 U/L 187 (H)   ALT Latest Ref Range: 0 - 50 U/L 58 (H)   AST Latest Ref Range: 0 - 45 U/L 78 (H)   Bilirubin Direct Latest Ref Range: 0.0 - 0.2 mg/dL 2.8 (H)   Results for EARLENE PEMBERTON (MRN [id number]) as of 7/11/2019 11:05   Ref. Range 4/18/2019 09:01   WBC Latest Ref Range: 4.0 - 11.0 10e9/L 6.7   Hemoglobin Latest Ref Range: 11.7 - 15.7 g/dL 13.4   Hematocrit Latest Ref Range: 35.0 - 47.0 % 38.4   Platelet Count Latest Ref Range: 150 - 450 10e9/L 58 (L)   Results for EARLENE PEMBERTON (MRN [id number]) as of 7/11/2019 11:05   Ref. Range 6/20/2019 09:57   INR Latest Ref Range: 0.86 - 1.14  2.01 (H)     I/P  She is sicker than her MELD.  Losing significant muscle.    She should see nutrition re: diet (with PSE, will be a challenge) and PT.  MELD progressing, but with A blood type, she won't get a reasonable offer until higher.  With ANGULO, should go through routine cardiac ischemia eval.  Needs to make sure CA screens are  UTD.  Follow progression.  I spent over 30 minutes/half in counseling.      Again, thank you for allowing me to participate in the care of your patient.      Sincerely,    Nghia Amaral MD

## 2019-07-11 NOTE — PROGRESS NOTES
Pt here for dobutamine stress test. Test, meds and side effects reviewed with patient. Achieved target HR at 30 mcg Dobutamine and a total of 0.2mg IV atropine. Gave a total of 2mg IV Metoprolol to bring HR back to baseline. Post monitoring complete and VSS. Pt escorted out to the gold waiting room.

## 2019-07-11 NOTE — PROGRESS NOTES
58 yo for year on waitlist visit.  ANGULO  MELD: 24 (kidneys too good)  Continues to progress, but slowly.  Has had a recent bout of PSE visit to ER.  Increased her lactulose.    Minimal ascites.  Weak, losing muscle.  No bleeding.    No diabetes or cardiac events.  Need to make sure cancer screens are up to date.    BP 96/68   Pulse 73   Wt 100.7 kg (222 lb)   SpO2 100%   BMI 40.60 kg/m    Abd soft, non tender   No ascites, no scars   Ext: 4+ lymphedema/swelling     Recent US: PV open, no masses.    Results for EARLENE GONZALES (MRN [id number]) as of 7/11/2019 11:05   Ref. Range 4/18/2019 09:01   Bilirubin Total Latest Ref Range: 0.2 - 1.3 mg/dL 6.2 (H)   Alkaline Phosphatase Latest Ref Range: 40 - 150 U/L 187 (H)   ALT Latest Ref Range: 0 - 50 U/L 58 (H)   AST Latest Ref Range: 0 - 45 U/L 78 (H)   Bilirubin Direct Latest Ref Range: 0.0 - 0.2 mg/dL 2.8 (H)   Results for EARLENE GONZALES (MRN [id number]) as of 7/11/2019 11:05   Ref. Range 4/18/2019 09:01   WBC Latest Ref Range: 4.0 - 11.0 10e9/L 6.7   Hemoglobin Latest Ref Range: 11.7 - 15.7 g/dL 13.4   Hematocrit Latest Ref Range: 35.0 - 47.0 % 38.4   Platelet Count Latest Ref Range: 150 - 450 10e9/L 58 (L)   Results for EARLENE GONZALES (MRN [id number]) as of 7/11/2019 11:05   Ref. Range 6/20/2019 09:57   INR Latest Ref Range: 0.86 - 1.14  2.01 (H)     I/P  She is sicker than her MELD.  Losing significant muscle.    She should see nutrition re: diet (with PSE, will be a challenge) and PT.  MELD progressing, but with A blood type, she won't get a reasonable offer until higher.  With ANGULO, should go through routine cardiac ischemia eval.  Needs to make sure CA screens are UTD.  Follow progression.  I spent over 30 minutes/half in counseling.

## 2019-07-15 ENCOUNTER — DOCUMENTATION ONLY (OUTPATIENT)
Dept: TRANSPLANT | Facility: CLINIC | Age: 59
End: 2019-07-15

## 2019-07-15 NOTE — PROGRESS NOTES
Pt saw Dr. Amaral 7/11/9 who recommended f/u with nutrition and PT. Pt also due for routine pap smear.     Call made to pt to review above. Pt will call to scheduled nutrition, number provided and referral placed. Pt also confirmed she is working with her ortho provider regarding PT and is working to schedule her pap. Pt denied questions or concerns at this time, f/u in place with Dr. Leventhal.

## 2019-07-18 DIAGNOSIS — K74.60 CIRRHOSIS OF LIVER WITHOUT ASCITES, UNSPECIFIED HEPATIC CIRRHOSIS TYPE (H): ICD-10-CM

## 2019-07-18 LAB
ALBUMIN SERPL-MCNC: 2.3 G/DL (ref 3.4–5)
BILIRUB SERPL-MCNC: 6.5 MG/DL (ref 0.2–1.3)
CREAT SERPL-MCNC: 0.65 MG/DL (ref 0.52–1.04)
GFR SERPL CREATININE-BSD FRML MDRD: >90 ML/MIN/{1.73_M2}
INR PPP: 2.01 (ref 0.86–1.14)
SODIUM SERPL-SCNC: 132 MMOL/L (ref 133–144)

## 2019-07-26 ENCOUNTER — TRANSFERRED RECORDS (OUTPATIENT)
Dept: HEALTH INFORMATION MANAGEMENT | Facility: CLINIC | Age: 59
End: 2019-07-26

## 2019-07-29 ENCOUNTER — RECORDS - HEALTHEAST (OUTPATIENT)
Dept: VASCULAR ULTRASOUND | Facility: CLINIC | Age: 59
End: 2019-07-29

## 2019-07-29 ENCOUNTER — OFFICE VISIT - HEALTHEAST (OUTPATIENT)
Dept: VASCULAR SURGERY | Facility: CLINIC | Age: 59
End: 2019-07-29

## 2019-07-29 DIAGNOSIS — M79.89 LEG SWELLING: ICD-10-CM

## 2019-07-29 DIAGNOSIS — M79.89 OTHER SPECIFIED SOFT TISSUE DISORDERS: ICD-10-CM

## 2019-07-29 DIAGNOSIS — G60.9 HEREDITARY AND IDIOPATHIC NEUROPATHY, UNSPECIFIED: ICD-10-CM

## 2019-07-29 DIAGNOSIS — M25.562 CHRONIC PAIN OF BOTH KNEES: ICD-10-CM

## 2019-07-29 DIAGNOSIS — I89.0 LYMPHEDEMA, NOT ELSEWHERE CLASSIFIED: ICD-10-CM

## 2019-07-29 DIAGNOSIS — G89.29 CHRONIC PAIN OF BOTH KNEES: ICD-10-CM

## 2019-07-29 DIAGNOSIS — M25.562 PAIN IN LEFT KNEE: ICD-10-CM

## 2019-07-29 DIAGNOSIS — K74.60 UNSPECIFIED CIRRHOSIS OF LIVER (H): ICD-10-CM

## 2019-07-29 DIAGNOSIS — E66.01 CLASS 3 SEVERE OBESITY WITH SERIOUS COMORBIDITY AND BODY MASS INDEX (BMI) OF 40.0 TO 44.9 IN ADULT, UNSPECIFIED OBESITY TYPE (H): ICD-10-CM

## 2019-07-29 DIAGNOSIS — I87.303 VENOUS HYPERTENSION OF LOWER EXTREMITY, BILATERAL: ICD-10-CM

## 2019-07-29 DIAGNOSIS — I87.303 CHRONIC VENOUS HYPERTENSION (IDIOPATHIC) WITHOUT COMPLICATIONS OF BILATERAL LOWER EXTREMITY: ICD-10-CM

## 2019-07-29 DIAGNOSIS — M25.561 PAIN IN RIGHT KNEE: ICD-10-CM

## 2019-07-29 DIAGNOSIS — M17.0 OSTEOARTHRITIS OF BOTH KNEES, UNSPECIFIED OSTEOARTHRITIS TYPE: ICD-10-CM

## 2019-07-29 DIAGNOSIS — I89.0 ACQUIRED LYMPHEDEMA OF LEG: ICD-10-CM

## 2019-07-29 DIAGNOSIS — K75.81 NONALCOHOLIC STEATOHEPATITIS (NASH): ICD-10-CM

## 2019-07-29 DIAGNOSIS — E66.01 MORBID (SEVERE) OBESITY DUE TO EXCESS CALORIES (H): ICD-10-CM

## 2019-07-29 DIAGNOSIS — G47.62 CRAMP IN LOWER EXTREMITY ASSOCIATED WITH SLEEP: ICD-10-CM

## 2019-07-29 DIAGNOSIS — G47.62 SLEEP RELATED LEG CRAMPS: ICD-10-CM

## 2019-07-29 DIAGNOSIS — G60.9 IDIOPATHIC PERIPHERAL NEUROPATHY: ICD-10-CM

## 2019-07-29 DIAGNOSIS — M25.561 CHRONIC PAIN OF BOTH KNEES: ICD-10-CM

## 2019-07-29 DIAGNOSIS — E66.813 CLASS 3 SEVERE OBESITY WITH SERIOUS COMORBIDITY AND BODY MASS INDEX (BMI) OF 40.0 TO 44.9 IN ADULT, UNSPECIFIED OBESITY TYPE (H): ICD-10-CM

## 2019-07-29 DIAGNOSIS — M17.0 BILATERAL PRIMARY OSTEOARTHRITIS OF KNEE: ICD-10-CM

## 2019-07-29 DIAGNOSIS — K74.60 LIVER CIRRHOSIS SECONDARY TO NASH (H): ICD-10-CM

## 2019-07-29 DIAGNOSIS — K75.81 LIVER CIRRHOSIS SECONDARY TO NASH (H): ICD-10-CM

## 2019-07-29 DIAGNOSIS — G89.29 OTHER CHRONIC PAIN: ICD-10-CM

## 2019-07-29 ASSESSMENT — MIFFLIN-ST. JEOR: SCORE: 1537.49

## 2019-07-30 ENCOUNTER — COMMUNICATION - HEALTHEAST (OUTPATIENT)
Dept: VASCULAR SURGERY | Facility: CLINIC | Age: 59
End: 2019-07-30

## 2019-07-30 ENCOUNTER — TELEPHONE (OUTPATIENT)
Dept: GASTROENTEROLOGY | Facility: CLINIC | Age: 59
End: 2019-07-30

## 2019-07-30 ENCOUNTER — COMMUNICATION - HEALTHEAST (OUTPATIENT)
Dept: OTHER | Facility: CLINIC | Age: 59
End: 2019-07-30

## 2019-07-30 NOTE — TELEPHONE ENCOUNTER
INGRIS with input from Dr. Leventhal as stated below. Asked that patient call writer back if she has any questions regarding the message.    Eleonora Carney LPN  Hepatology Clinic    ----- Message from Thomas Michael Leventhal, MD sent at 7/30/2019  1:47 PM CDT -----  Regarding: RE: Urobilinogen result  Nope    It is a result of having more bilirubin in their blood, so to be expected    TL  ----- Message -----  From: Eleonora Carney LPN  Sent: 7/30/2019  10:07 AM  To: Thomas Michael Leventhal, MD  Subject: Urobilinogen result                              Pt reports having a UA recently that showed urobilinogen  3+ pt is wondering if this is something to be concerned about?

## 2019-07-31 DIAGNOSIS — K74.60 CIRRHOSIS OF LIVER WITHOUT ASCITES, UNSPECIFIED HEPATIC CIRRHOSIS TYPE (H): Primary | ICD-10-CM

## 2019-08-02 PROBLEM — K65.2 SBP (SPONTANEOUS BACTERIAL PERITONITIS) (H): Status: ACTIVE | Noted: 2019-08-02

## 2019-08-03 ENCOUNTER — HOME CARE/HOSPICE - HEALTHEAST (OUTPATIENT)
Dept: HOME HEALTH SERVICES | Facility: HOME HEALTH | Age: 59
End: 2019-08-03

## 2019-08-05 ENCOUNTER — DOCUMENTATION ONLY (OUTPATIENT)
Dept: TRANSPLANT | Facility: CLINIC | Age: 59
End: 2019-08-05

## 2019-08-05 NOTE — PROGRESS NOTES
"Admitted 7/31/19 at [facility] with two days hx of frequent watery diarrhea with BRBPR/dry heaving/lower abdominal pain. CT with \"Wall thickening involving the duodenum, proximal jejunum, and throughout the colon consistent with an inflammatory or infectious enterocolitis\" and ascites fluid-SBP, started on Zosyn. Spoke with Eufemia WARD, patient on regular floor with plans to TCU due to lymphedema treatment and demands being challenging for . Hemodynamically stable and ambulating with stand by assist due to HE.  C diff and bld cultures negative.  Reviewed with Dr. Leventhal with request for diagnostic paracentesis to ensure treated SBP. Spoke with Dr. Howard who requests enteric panel. Communicated to [facility].    Dr. Leventhal and Dr. Howard with verbal consent to make active.   "

## 2019-08-06 ENCOUNTER — COMMITTEE REVIEW (OUTPATIENT)
Dept: TRANSPLANT | Facility: CLINIC | Age: 59
End: 2019-08-06

## 2019-08-06 DIAGNOSIS — K74.60 CIRRHOSIS OF LIVER WITHOUT ASCITES, UNSPECIFIED HEPATIC CIRRHOSIS TYPE (H): Primary | ICD-10-CM

## 2019-08-06 NOTE — COMMITTEE REVIEW
Abdominal Patient Discussion Note Transplant Coordinator: Monae Tapia  Transplant Surgeon:   Mandeep Alford    Referring Physician: Raymond Leija    Committee Review Members:  Nurse Dariel Hood, N   Pharmacy Shankar Gore, [facility]    - Clinical ARIEL Young, Jane Hung, [facility]   Transplant Pedro Cruz MD, Nicci Sanders MD, Shawanda Oro, APRN CNP, Fior Pearce RN, Nerissa Aranda MD, Monae Tapia RN, Hui Cross MD, Selena Howard MD   Transplant Hepatology  Thomas M. Leventhal, MD       Additional Discussion Notes and Findings:     Pt should remain active on transplant list.

## 2019-08-07 ENCOUNTER — TELEPHONE (OUTPATIENT)
Dept: TRANSPLANT | Facility: CLINIC | Age: 59
End: 2019-08-07

## 2019-08-07 NOTE — TELEPHONE ENCOUNTER
Spoke to [facility] RN to ensure MELD labs are being drawn today. Patient is stable but still has HE with lactulose titration. Plans to transfer to TCU in next few days. Enteric pathogen panel pending.

## 2019-08-08 ENCOUNTER — COMMUNICATION - HEALTHEAST (OUTPATIENT)
Dept: OTHER | Facility: CLINIC | Age: 59
End: 2019-08-08

## 2019-08-08 NOTE — TELEPHONE ENCOUNTER
amand  RECORDS RECEIVED FROM: hospital follow up, per Eleonora   DATE RECEIVED: 08.19.2019   NOTES STATUS DETAILS   OFFICE NOTE from referring provider N/A    OFFICE NOTES from other specialists Internal 06.18.2018 04.24.2018   DISCHARGE SUMMARY from hospital Care Everywhere 07.31.2019   MEDICATION LIST Internal    LIVER BIOSPY (IF APPLICABLE)      PATHOLOGY REPORTS  N/A    IMAGING     ENDOSCOPY (IF AVAILABLE) Internal 04.13.2018   COLONOSCOPY (IF AVAILABLE) Internal 06.27.2018   ULTRASOUND LIVER Internal 03.20.2019   CT OF ABDOMEN Care Everywhere 07.31.2019   MRI OF LIVER Internal 06.27.2018   FIBROSCAN, US ELASTOGRAPHY, FIBROSIS SCAN, MR ELASTOGRAPHY N/A    LABS     HEPATIC PANEL (LIVER PANEL) In process 08.06.2019   BASIC METABOLIC PANEL In process 08.06.2019   COMPLETE METABOLIC PANEL Care Everywhere 08.08.2019   COMPLETE BLOOD COUNT (CBC) In process 08.06.2019   INTERNATIONAL NORMALIZED RATIO (INR) In process 08.06.2019   HEPATITIS C ANTIBODY Internal 06.18.2019   HEPATITIS C VIRAL LOAD/PCR N/A    HEPATITIS C GENOTYPE N/A    HEPATITIS B SURFACE ANTIGEN Internal 06.18.2018    HEPATITIS B SURFACE ANTIBODY Internal 03.20.2019   HEPATITIS B DNA QUANT LEVEL N/A    HEPATITIS B CORE ANTIBODY Internal 06.18.2018 08.08.2019 Called [facility] room at [phone] to have the CT Abdomen pushed over. I lvm for them to push over the image.    08.15.2019 Image received.

## 2019-08-09 ENCOUNTER — OFFICE VISIT - HEALTHEAST (OUTPATIENT)
Dept: GERIATRICS | Facility: CLINIC | Age: 59
End: 2019-08-09

## 2019-08-09 ENCOUNTER — TELEPHONE (OUTPATIENT)
Dept: TRANSPLANT | Facility: CLINIC | Age: 59
End: 2019-08-09

## 2019-08-09 DIAGNOSIS — K74.60 CIRRHOSIS OF LIVER WITH ASCITES, UNSPECIFIED HEPATIC CIRRHOSIS TYPE (H): ICD-10-CM

## 2019-08-09 DIAGNOSIS — K65.2 SPONTANEOUS BACTERIAL PERITONITIS (H): ICD-10-CM

## 2019-08-09 DIAGNOSIS — E87.1 HYPONATREMIA: ICD-10-CM

## 2019-08-09 DIAGNOSIS — D69.6 THROMBOCYTOPENIA (H): ICD-10-CM

## 2019-08-09 DIAGNOSIS — R52 PAIN MANAGEMENT: ICD-10-CM

## 2019-08-09 DIAGNOSIS — K76.82 HEPATIC ENCEPHALOPATHY (H): ICD-10-CM

## 2019-08-09 DIAGNOSIS — D64.9 ANEMIA, UNSPECIFIED TYPE: ICD-10-CM

## 2019-08-09 DIAGNOSIS — R18.8 CIRRHOSIS OF LIVER WITH ASCITES, UNSPECIFIED HEPATIC CIRRHOSIS TYPE (H): ICD-10-CM

## 2019-08-09 NOTE — LETTER
PHYSICIAN ORDERS  Cerenity Maurizio Cruz [phone], fax: [phone]    DATE & TIME ISSUED: 201910:54 AM  PATIENT NAME: Nicci Pemberton   : 1960     [facility] MR# : [id number]     DIAGNOSIS:  Cirrhosis  ICD-9 CODE: K74.6   Lab order for Weekly lab draws (Orders  1 year after issue)  Labs need to be drawn on Monday's or Tuesday's  These labs must be drawn all together on the same day when done:     INR     Total Bili     Creatinine     Albumin     Sodium  PLEASE FAX RESULTS AS SOON AS POSSIBLE TO [phone], ATTN: LAB DE  Any questions please call:  JIMBO VazN,RN  Adult Liver Coordinator  [phone]  Thomas M. Leventhal, M.D.   of Medicine  Advanced & Transplant Hepatology  [facility]

## 2019-08-09 NOTE — TELEPHONE ENCOUNTER
Called [facility] where patient is currently at to talk to nurse. No one was available so I will fax over lab orders for weekly MELD.

## 2019-08-10 ENCOUNTER — RECORDS - HEALTHEAST (OUTPATIENT)
Dept: LAB | Facility: CLINIC | Age: 59
End: 2019-08-10

## 2019-08-10 LAB
ALBUMIN SERPL-MCNC: 1.6 G/DL (ref 3.5–5)
ALP SERPL-CCNC: 123 U/L (ref 45–120)
ALT SERPL W P-5'-P-CCNC: 40 U/L (ref 0–45)
AST SERPL W P-5'-P-CCNC: 96 U/L (ref 0–40)
BILIRUB DIRECT SERPL-MCNC: 8.1 MG/DL
BILIRUB SERPL-MCNC: 18 MG/DL (ref 0–1)
HGB BLD-MCNC: 10.4 G/DL (ref 12–16)
PLATELET # BLD AUTO: 48 THOU/UL (ref 140–440)
PROT SERPL-MCNC: 4.9 G/DL (ref 6–8)
SODIUM SERPL-SCNC: 130 MMOL/L (ref 136–145)
WBC: 12 THOU/UL (ref 4–11)

## 2019-08-12 ENCOUNTER — OFFICE VISIT - HEALTHEAST (OUTPATIENT)
Dept: GERIATRICS | Facility: CLINIC | Age: 59
End: 2019-08-12

## 2019-08-12 ENCOUNTER — RECORDS - HEALTHEAST (OUTPATIENT)
Dept: LAB | Facility: CLINIC | Age: 59
End: 2019-08-12

## 2019-08-12 DIAGNOSIS — K76.82 HEPATIC ENCEPHALOPATHY (H): ICD-10-CM

## 2019-08-12 DIAGNOSIS — R18.8 CIRRHOSIS OF LIVER WITH ASCITES, UNSPECIFIED HEPATIC CIRRHOSIS TYPE (H): ICD-10-CM

## 2019-08-12 DIAGNOSIS — K65.2 SPONTANEOUS BACTERIAL PERITONITIS (H): ICD-10-CM

## 2019-08-12 DIAGNOSIS — D69.6 THROMBOCYTOPENIA (H): ICD-10-CM

## 2019-08-12 DIAGNOSIS — D64.9 ANEMIA, UNSPECIFIED TYPE: ICD-10-CM

## 2019-08-12 DIAGNOSIS — K74.60 CIRRHOSIS OF LIVER WITH ASCITES, UNSPECIFIED HEPATIC CIRRHOSIS TYPE (H): ICD-10-CM

## 2019-08-12 DIAGNOSIS — E87.1 HYPONATREMIA: ICD-10-CM

## 2019-08-12 LAB
ALBUMIN SERPL-MCNC: 1.6 G/DL (ref 3.5–5)
BILIRUB SERPL-MCNC: 21.1 MG/DL (ref 0–1)
CREAT SERPL-MCNC: 1.11 MG/DL (ref 0.6–1.1)
GFR SERPL CREATININE-BSD FRML MDRD: 50 ML/MIN/1.73M2
INR PPP: 2.86 (ref 0.9–1.1)
SODIUM SERPL-SCNC: 131 MMOL/L (ref 136–145)

## 2019-08-12 NOTE — TELEPHONE ENCOUNTER
Attempted to reach TCU to confirm plan for MELD labs, VM left for unit ([place], [facility], ph [phone]).

## 2019-08-13 ENCOUNTER — RECORDS - HEALTHEAST (OUTPATIENT)
Dept: LAB | Facility: CLINIC | Age: 59
End: 2019-08-13

## 2019-08-14 ENCOUNTER — TELEPHONE (OUTPATIENT)
Dept: TRANSPLANT | Facility: CLINIC | Age: 59
End: 2019-08-14

## 2019-08-14 ENCOUNTER — OFFICE VISIT - HEALTHEAST (OUTPATIENT)
Dept: GERIATRICS | Facility: CLINIC | Age: 59
End: 2019-08-14

## 2019-08-14 ENCOUNTER — TRANSFERRED RECORDS (OUTPATIENT)
Dept: HEALTH INFORMATION MANAGEMENT | Facility: CLINIC | Age: 59
End: 2019-08-14

## 2019-08-14 DIAGNOSIS — R18.8 CIRRHOSIS OF LIVER WITH ASCITES, UNSPECIFIED HEPATIC CIRRHOSIS TYPE (H): ICD-10-CM

## 2019-08-14 DIAGNOSIS — K76.82 HEPATIC ENCEPHALOPATHY (H): ICD-10-CM

## 2019-08-14 DIAGNOSIS — D64.9 ANEMIA, UNSPECIFIED TYPE: ICD-10-CM

## 2019-08-14 DIAGNOSIS — E87.1 HYPONATREMIA: ICD-10-CM

## 2019-08-14 DIAGNOSIS — K74.60 CIRRHOSIS OF LIVER WITH ASCITES, UNSPECIFIED HEPATIC CIRRHOSIS TYPE (H): ICD-10-CM

## 2019-08-14 DIAGNOSIS — I95.89 OTHER SPECIFIED HYPOTENSION: ICD-10-CM

## 2019-08-14 DIAGNOSIS — K65.2 SPONTANEOUS BACTERIAL PERITONITIS (H): ICD-10-CM

## 2019-08-14 DIAGNOSIS — E87.70 HYPERVOLEMIA, UNSPECIFIED HYPERVOLEMIA TYPE: ICD-10-CM

## 2019-08-14 DIAGNOSIS — D69.6 THROMBOCYTOPENIA (H): ICD-10-CM

## 2019-08-14 LAB
ALBUMIN SERPL-MCNC: 1.5 G/DL (ref 3.5–5)
ALBUMIN SERPL-MCNC: 1.5 G/DL (ref 3.5–5)
ALP SERPL-CCNC: 142 U/L (ref 45–120)
ALT SERPL W P-5'-P-CCNC: 40 U/L (ref 0–45)
ALT SERPL-CCNC: 40 U/L (ref 0–45)
ANION GAP SERPL CALCULATED.3IONS-SCNC: 5 MMOL/L (ref 5–18)
AST SERPL W P-5'-P-CCNC: 92 U/L (ref 0–40)
AST SERPL-CCNC: 92 U/L (ref 0–40)
BILIRUB DIRECT SERPL-MCNC: 11.8 MG/DL
BILIRUB SERPL-MCNC: 22.9 MG/DL (ref 0–1)
BUN SERPL-MCNC: 30 MG/DL (ref 8–22)
CALCIUM SERPL-MCNC: 8.1 MG/DL (ref 8.5–10.5)
CHLORIDE BLD-SCNC: 94 MMOL/L (ref 98–107)
CO2 SERPL-SCNC: 28 MMOL/L (ref 22–31)
CREAT SERPL-MCNC: 1.88 MG/DL (ref 0.6–1.1)
CREAT SERPL-MCNC: 1.88 MG/DL (ref 0.6–1.1)
ERYTHROCYTE [DISTWIDTH] IN BLOOD BY AUTOMATED COUNT: 14.8 % (ref 11–14.5)
GFR SERPL CREATININE-BSD FRML MDRD: 27 ML/MIN/1.73M2
GFR SERPL CREATININE-BSD FRML MDRD: 27 ML/MIN/1.73M2
GLUCOSE BLD-MCNC: 77 MG/DL (ref 70–125)
GLUCOSE SERPL-MCNC: 77 MG/DL (ref 70–125)
HCT VFR BLD AUTO: 27.7 % (ref 35–47)
HGB BLD-MCNC: 9.5 G/DL (ref 12–16)
MCH RBC QN AUTO: 38.8 PG (ref 27–34)
MCHC RBC AUTO-ENTMCNC: 34.3 G/DL (ref 32–36)
MCV RBC AUTO: 113 FL (ref 80–100)
PLATELET # BLD AUTO: 56 THOU/UL (ref 140–440)
PMV BLD AUTO: 10 FL (ref 8.5–12.5)
POTASSIUM BLD-SCNC: 4.5 MMOL/L (ref 3.5–5)
POTASSIUM SERPL-SCNC: 4.5 MMOL/L (ref 3.5–5)
PROT SERPL-MCNC: 4.7 G/DL (ref 6–8)
RBC # BLD AUTO: 2.45 MILL/UL (ref 3.8–5.4)
SODIUM SERPL-SCNC: 127 MMOL/L (ref 136–145)
WBC: 9.7 THOU/UL (ref 4–11)

## 2019-08-14 NOTE — TELEPHONE ENCOUNTER
MELD 33. Total bili increased, message sent to Dr. Leventhal updating him of labs, will update patient with any recommendations.     Call made to pt to check in and update her of current MELD.     Pt reports she is managing okay. Continues to work with rehab at [facility]. Confirmed she will attend appt with Dr. Leventhal on 8/19. Contact phone numbers confirmed. Pt denied questions or concerns regarding listing or possibility of offers.

## 2019-08-16 ENCOUNTER — OFFICE VISIT - HEALTHEAST (OUTPATIENT)
Dept: GERIATRICS | Facility: CLINIC | Age: 59
End: 2019-08-16

## 2019-08-16 ENCOUNTER — TELEPHONE (OUTPATIENT)
Dept: GASTROENTEROLOGY | Facility: CLINIC | Age: 59
End: 2019-08-16

## 2019-08-16 ENCOUNTER — APPOINTMENT (OUTPATIENT)
Dept: ULTRASOUND IMAGING | Facility: CLINIC | Age: 59
DRG: 005 | End: 2019-08-16
Attending: EMERGENCY MEDICINE
Payer: COMMERCIAL

## 2019-08-16 ENCOUNTER — HOSPITAL ENCOUNTER (INPATIENT)
Facility: CLINIC | Age: 59
LOS: 38 days | Discharge: NURSING, REHAB OR LONG-TERM CARE | DRG: 005 | End: 2019-09-23
Attending: EMERGENCY MEDICINE | Admitting: INTERNAL MEDICINE
Payer: COMMERCIAL

## 2019-08-16 ENCOUNTER — APPOINTMENT (OUTPATIENT)
Dept: GENERAL RADIOLOGY | Facility: CLINIC | Age: 59
DRG: 005 | End: 2019-08-16
Payer: COMMERCIAL

## 2019-08-16 DIAGNOSIS — M62.81 GENERALIZED MUSCLE WEAKNESS: ICD-10-CM

## 2019-08-16 DIAGNOSIS — Z94.4 STATUS POST LIVER TRANSPLANTATION (H): Chronic | ICD-10-CM

## 2019-08-16 DIAGNOSIS — N17.9 ACUTE KIDNEY INJURY (H): ICD-10-CM

## 2019-08-16 DIAGNOSIS — K74.60 CIRRHOSIS OF LIVER WITH ASCITES, UNSPECIFIED HEPATIC CIRRHOSIS TYPE (H): ICD-10-CM

## 2019-08-16 DIAGNOSIS — T14.8XXA SUTURE OF SKIN WOUND: Primary | ICD-10-CM

## 2019-08-16 DIAGNOSIS — K65.2 SPONTANEOUS BACTERIAL PERITONITIS (H): ICD-10-CM

## 2019-08-16 DIAGNOSIS — E87.1 HYPONATREMIA: ICD-10-CM

## 2019-08-16 DIAGNOSIS — R18.8 CIRRHOSIS OF LIVER WITH ASCITES, UNSPECIFIED HEPATIC CIRRHOSIS TYPE (H): ICD-10-CM

## 2019-08-16 DIAGNOSIS — E43 SEVERE MALNUTRITION (H): ICD-10-CM

## 2019-08-16 DIAGNOSIS — E86.1 HYPOTENSION DUE TO HYPOVOLEMIA: ICD-10-CM

## 2019-08-16 DIAGNOSIS — K76.82 HEPATIC ENCEPHALOPATHY (H): ICD-10-CM

## 2019-08-16 PROBLEM — K72.10 END-STAGE LIVER DISEASE (H): Status: ACTIVE | Noted: 2019-08-16

## 2019-08-16 LAB
ALBUMIN SERPL-MCNC: 1.6 G/DL (ref 3.4–5)
ALBUMIN UR-MCNC: NEGATIVE MG/DL
ALP SERPL-CCNC: 152 U/L (ref 40–150)
ALT SERPL W P-5'-P-CCNC: 51 U/L (ref 0–50)
AMMONIA PLAS-SCNC: NORMAL UMOL/L (ref 10–50)
AMMONIA PLAS-SCNC: NORMAL UMOL/L (ref 10–50)
ANION GAP SERPL CALCULATED.3IONS-SCNC: 7 MMOL/L (ref 3–14)
APPEARANCE FLD: NORMAL
APPEARANCE UR: CLEAR
AST SERPL W P-5'-P-CCNC: ABNORMAL U/L (ref 0–45)
BACTERIA #/AREA URNS HPF: ABNORMAL /HPF
BASOPHILS # BLD AUTO: 0 10E9/L (ref 0–0.2)
BASOPHILS NFR BLD AUTO: 0.4 %
BILIRUB SERPL-MCNC: 24.2 MG/DL (ref 0.2–1.3)
BILIRUB UR QL STRIP: ABNORMAL
BUN SERPL-MCNC: 40 MG/DL (ref 7–30)
C DIFF TOX B STL QL: NEGATIVE
CALCIUM SERPL-MCNC: 8.2 MG/DL (ref 8.5–10.1)
CHLORIDE SERPL-SCNC: 94 MMOL/L (ref 94–109)
CO2 SERPL-SCNC: 25 MMOL/L (ref 20–32)
COLOR FLD: YELLOW
COLOR UR AUTO: ABNORMAL
CREAT SERPL-MCNC: 2.3 MG/DL (ref 0.52–1.04)
DIFFERENTIAL METHOD BLD: ABNORMAL
EOSINOPHIL # BLD AUTO: 0.1 10E9/L (ref 0–0.7)
EOSINOPHIL NFR BLD AUTO: 1.3 %
ERYTHROCYTE [DISTWIDTH] IN BLOOD BY AUTOMATED COUNT: 15 % (ref 10–15)
GFR SERPL CREATININE-BSD FRML MDRD: 22 ML/MIN/{1.73_M2}
GLUCOSE SERPL-MCNC: 82 MG/DL (ref 70–99)
GLUCOSE UR STRIP-MCNC: NEGATIVE MG/DL
GRAM STN SPEC: NORMAL
GRAM STN SPEC: NORMAL
HCT VFR BLD AUTO: 30.7 % (ref 35–47)
HGB BLD-MCNC: 10.3 G/DL (ref 11.7–15.7)
HGB UR QL STRIP: NEGATIVE
IMM GRANULOCYTES # BLD: 0.4 10E9/L (ref 0–0.4)
IMM GRANULOCYTES NFR BLD: 4.8 %
KETONES UR STRIP-MCNC: NEGATIVE MG/DL
LACTATE BLD-SCNC: 1.9 MMOL/L (ref 0.7–2)
LEUKOCYTE ESTERASE UR QL STRIP: NEGATIVE
LIPASE SERPL-CCNC: 364 U/L (ref 73–393)
LYMPHOCYTES # BLD AUTO: 0.3 10E9/L (ref 0.8–5.3)
LYMPHOCYTES NFR BLD AUTO: 3.4 %
LYMPHOCYTES NFR FLD MANUAL: 48 %
MCH RBC QN AUTO: 38.4 PG (ref 26.5–33)
MCHC RBC AUTO-ENTMCNC: 33.6 G/DL (ref 31.5–36.5)
MCV RBC AUTO: 115 FL (ref 78–100)
MONOCYTES # BLD AUTO: 1.1 10E9/L (ref 0–1.3)
MONOCYTES NFR BLD AUTO: 12.6 %
MUCOUS THREADS #/AREA URNS LPF: PRESENT /LPF
NEUTROPHILS # BLD AUTO: 7 10E9/L (ref 1.6–8.3)
NEUTROPHILS NFR BLD AUTO: 77.5 %
NEUTS BAND NFR FLD MANUAL: 28 %
NITRATE UR QL: NEGATIVE
NRBC # BLD AUTO: 0 10*3/UL
NRBC BLD AUTO-RTO: 0 /100
OTHER CELLS FLD MANUAL: 24 %
PH UR STRIP: 5.5 PH (ref 5–7)
PLATELET # BLD AUTO: 59 10E9/L (ref 150–450)
POTASSIUM SERPL-SCNC: 5 MMOL/L (ref 3.4–5.3)
PROT SERPL-MCNC: 5.4 G/DL (ref 6.8–8.8)
RBC # BLD AUTO: 2.68 10E12/L (ref 3.8–5.2)
RBC #/AREA URNS AUTO: 0 /HPF (ref 0–2)
SODIUM SERPL-SCNC: 126 MMOL/L (ref 133–144)
SOURCE: ABNORMAL
SP GR UR STRIP: 1.01 (ref 1–1.03)
SPECIMEN SOURCE FLD: NORMAL
SPECIMEN SOURCE: NORMAL
SPECIMEN SOURCE: NORMAL
SQUAMOUS #/AREA URNS AUTO: 2 /HPF (ref 0–1)
TRANS CELLS #/AREA URNS HPF: <1 /HPF (ref 0–1)
UROBILINOGEN UR STRIP-MCNC: NORMAL MG/DL (ref 0–2)
WBC # BLD AUTO: 9.1 10E9/L (ref 4–11)
WBC # FLD AUTO: 158 /UL
WBC #/AREA URNS AUTO: 1 /HPF (ref 0–5)

## 2019-08-16 PROCEDURE — 84460 ALANINE AMINO (ALT) (SGPT): CPT

## 2019-08-16 PROCEDURE — 82565 ASSAY OF CREATININE: CPT

## 2019-08-16 PROCEDURE — 85025 COMPLETE CBC W/AUTO DIFF WBC: CPT | Performed by: EMERGENCY MEDICINE

## 2019-08-16 PROCEDURE — 84155 ASSAY OF PROTEIN SERUM: CPT

## 2019-08-16 PROCEDURE — 71046 X-RAY EXAM CHEST 2 VIEWS: CPT

## 2019-08-16 PROCEDURE — 12000001 ZZH R&B MED SURG/OB UMMC

## 2019-08-16 PROCEDURE — 25000128 H RX IP 250 OP 636: Performed by: EMERGENCY MEDICINE

## 2019-08-16 PROCEDURE — 82040 ASSAY OF SERUM ALBUMIN: CPT

## 2019-08-16 PROCEDURE — 82140 ASSAY OF AMMONIA: CPT | Performed by: EMERGENCY MEDICINE

## 2019-08-16 PROCEDURE — 82435 ASSAY OF BLOOD CHLORIDE: CPT

## 2019-08-16 PROCEDURE — 87040 BLOOD CULTURE FOR BACTERIA: CPT | Performed by: EMERGENCY MEDICINE

## 2019-08-16 PROCEDURE — 83605 ASSAY OF LACTIC ACID: CPT | Performed by: EMERGENCY MEDICINE

## 2019-08-16 PROCEDURE — 87205 SMEAR GRAM STAIN: CPT | Performed by: STUDENT IN AN ORGANIZED HEALTH CARE EDUCATION/TRAINING PROGRAM

## 2019-08-16 PROCEDURE — 82310 ASSAY OF CALCIUM: CPT

## 2019-08-16 PROCEDURE — 76705 ECHO EXAM OF ABDOMEN: CPT

## 2019-08-16 PROCEDURE — 87070 CULTURE OTHR SPECIMN AEROBIC: CPT | Performed by: STUDENT IN AN ORGANIZED HEALTH CARE EDUCATION/TRAINING PROGRAM

## 2019-08-16 PROCEDURE — 84132 ASSAY OF SERUM POTASSIUM: CPT

## 2019-08-16 PROCEDURE — 84075 ASSAY ALKALINE PHOSPHATASE: CPT

## 2019-08-16 PROCEDURE — 81001 URINALYSIS AUTO W/SCOPE: CPT | Performed by: STUDENT IN AN ORGANIZED HEALTH CARE EDUCATION/TRAINING PROGRAM

## 2019-08-16 PROCEDURE — 82247 BILIRUBIN TOTAL: CPT

## 2019-08-16 PROCEDURE — 49083 ABD PARACENTESIS W/IMAGING: CPT | Performed by: PEDIATRICS

## 2019-08-16 PROCEDURE — 84295 ASSAY OF SERUM SODIUM: CPT

## 2019-08-16 PROCEDURE — 25000132 ZZH RX MED GY IP 250 OP 250 PS 637: Performed by: STUDENT IN AN ORGANIZED HEALTH CARE EDUCATION/TRAINING PROGRAM

## 2019-08-16 PROCEDURE — 99285 EMERGENCY DEPT VISIT HI MDM: CPT | Mod: Z6 | Performed by: EMERGENCY MEDICINE

## 2019-08-16 PROCEDURE — 83690 ASSAY OF LIPASE: CPT | Performed by: EMERGENCY MEDICINE

## 2019-08-16 PROCEDURE — 96361 HYDRATE IV INFUSION ADD-ON: CPT | Performed by: EMERGENCY MEDICINE

## 2019-08-16 PROCEDURE — 99223 1ST HOSP IP/OBS HIGH 75: CPT | Mod: AI | Performed by: INTERNAL MEDICINE

## 2019-08-16 PROCEDURE — 82374 ASSAY BLOOD CARBON DIOXIDE: CPT

## 2019-08-16 PROCEDURE — 84520 ASSAY OF UREA NITROGEN: CPT

## 2019-08-16 PROCEDURE — 87493 C DIFF AMPLIFIED PROBE: CPT | Performed by: STUDENT IN AN ORGANIZED HEALTH CARE EDUCATION/TRAINING PROGRAM

## 2019-08-16 PROCEDURE — 0W9G3ZX DRAINAGE OF PERITONEAL CAVITY, PERCUTANEOUS APPROACH, DIAGNOSTIC: ICD-10-PCS | Performed by: PEDIATRICS

## 2019-08-16 PROCEDURE — 99285 EMERGENCY DEPT VISIT HI MDM: CPT | Mod: 25 | Performed by: EMERGENCY MEDICINE

## 2019-08-16 PROCEDURE — 25000128 H RX IP 250 OP 636: Performed by: STUDENT IN AN ORGANIZED HEALTH CARE EDUCATION/TRAINING PROGRAM

## 2019-08-16 PROCEDURE — 87086 URINE CULTURE/COLONY COUNT: CPT | Performed by: STUDENT IN AN ORGANIZED HEALTH CARE EDUCATION/TRAINING PROGRAM

## 2019-08-16 PROCEDURE — 89051 BODY FLUID CELL COUNT: CPT | Performed by: STUDENT IN AN ORGANIZED HEALTH CARE EDUCATION/TRAINING PROGRAM

## 2019-08-16 PROCEDURE — 96365 THER/PROPH/DIAG IV INF INIT: CPT | Performed by: EMERGENCY MEDICINE

## 2019-08-16 PROCEDURE — 82947 ASSAY GLUCOSE BLOOD QUANT: CPT

## 2019-08-16 PROCEDURE — 87506 IADNA-DNA/RNA PROBE TQ 6-11: CPT | Performed by: STUDENT IN AN ORGANIZED HEALTH CARE EDUCATION/TRAINING PROGRAM

## 2019-08-16 PROCEDURE — P9047 ALBUMIN (HUMAN), 25%, 50ML: HCPCS | Performed by: STUDENT IN AN ORGANIZED HEALTH CARE EDUCATION/TRAINING PROGRAM

## 2019-08-16 RX ORDER — CIPROFLOXACIN 500 MG/1
500 TABLET, FILM COATED ORAL DAILY
Status: ON HOLD | COMMUNITY
Start: 2019-08-08 | End: 2019-09-21

## 2019-08-16 RX ORDER — MAGNESIUM OXIDE 400 MG/1
400 TABLET ORAL DAILY
Status: DISCONTINUED | OUTPATIENT
Start: 2019-08-16 | End: 2019-08-17

## 2019-08-16 RX ORDER — SPIRONOLACTONE 100 MG/1
100 TABLET, FILM COATED ORAL
Status: DISCONTINUED | OUTPATIENT
Start: 2019-08-16 | End: 2019-08-16

## 2019-08-16 RX ORDER — LEVOTHYROXINE SODIUM 125 UG/1
125 TABLET ORAL DAILY
Status: ON HOLD | COMMUNITY
End: 2019-09-21

## 2019-08-16 RX ORDER — FAMOTIDINE 20 MG/1
20 TABLET, FILM COATED ORAL 2 TIMES DAILY
Status: DISCONTINUED | OUTPATIENT
Start: 2019-08-16 | End: 2019-08-23

## 2019-08-16 RX ORDER — LEVOTHYROXINE SODIUM 125 UG/1
125 TABLET ORAL DAILY
Status: DISCONTINUED | OUTPATIENT
Start: 2019-08-16 | End: 2019-09-11

## 2019-08-16 RX ORDER — LACTULOSE 10 G/15ML
30 SOLUTION ORAL 2 TIMES DAILY
Status: CANCELLED | OUTPATIENT
Start: 2019-08-16

## 2019-08-16 RX ORDER — LIDOCAINE 40 MG/G
CREAM TOPICAL
Status: DISCONTINUED | OUTPATIENT
Start: 2019-08-16 | End: 2019-09-23 | Stop reason: HOSPADM

## 2019-08-16 RX ORDER — SPIRONOLACTONE 100 MG/1
100 TABLET, FILM COATED ORAL 2 TIMES DAILY
Status: ON HOLD | COMMUNITY
End: 2019-09-21

## 2019-08-16 RX ORDER — LACTULOSE 20 G/30ML
30 SOLUTION ORAL 3 TIMES DAILY
Status: ON HOLD | COMMUNITY
End: 2019-09-21

## 2019-08-16 RX ORDER — LACTULOSE 10 G/15ML
30 SOLUTION ORAL 2 TIMES DAILY
Status: DISCONTINUED | OUTPATIENT
Start: 2019-08-16 | End: 2019-08-17

## 2019-08-16 RX ORDER — ALBUMIN (HUMAN) 12.5 G/50ML
100 SOLUTION INTRAVENOUS ONCE
Status: COMPLETED | OUTPATIENT
Start: 2019-08-16 | End: 2019-08-16

## 2019-08-16 RX ORDER — SODIUM CHLORIDE 9 MG/ML
1000 INJECTION, SOLUTION INTRAVENOUS CONTINUOUS
Status: DISCONTINUED | OUTPATIENT
Start: 2019-08-16 | End: 2019-08-16

## 2019-08-16 RX ORDER — NALOXONE HYDROCHLORIDE 0.4 MG/ML
.1-.4 INJECTION, SOLUTION INTRAMUSCULAR; INTRAVENOUS; SUBCUTANEOUS
Status: DISCONTINUED | OUTPATIENT
Start: 2019-08-16 | End: 2019-09-23 | Stop reason: HOSPADM

## 2019-08-16 RX ADMIN — SPIRONOLACTONE 100 MG: 100 TABLET ORAL at 17:18

## 2019-08-16 RX ADMIN — ALBUMIN HUMAN 100 G: 0.25 SOLUTION INTRAVENOUS at 19:29

## 2019-08-16 RX ADMIN — LACTULOSE 30 ML: 20 SOLUTION ORAL at 19:30

## 2019-08-16 RX ADMIN — RIFAXIMIN 550 MG: 550 TABLET ORAL at 19:30

## 2019-08-16 RX ADMIN — FAMOTIDINE 20 MG: 20 TABLET ORAL at 19:30

## 2019-08-16 RX ADMIN — MAGNESIUM OXIDE TAB 400 MG (241.3 MG ELEMENTAL MG) 400 MG: 400 (241.3 MG) TAB at 17:19

## 2019-08-16 RX ADMIN — SODIUM CHLORIDE 1000 ML: 9 INJECTION, SOLUTION INTRAVENOUS at 12:39

## 2019-08-16 RX ADMIN — LEVOTHYROXINE SODIUM 125 MCG: 0.12 TABLET ORAL at 17:18

## 2019-08-16 RX ADMIN — MELATONIN 1000 UNITS: at 19:30

## 2019-08-16 ASSESSMENT — ACTIVITIES OF DAILY LIVING (ADL)
COGNITION: 0 - NO COGNITION ISSUES REPORTED
RETIRED_EATING: 0-->INDEPENDENT
SWALLOWING: 0-->SWALLOWS FOODS/LIQUIDS WITHOUT DIFFICULTY
BATHING: 2-->ASSISTIVE PERSON
DRESS: 2-->ASSISTIVE PERSON
TOILETING: 1-->ASSISTIVE EQUIPMENT
TRANSFERRING: 1-->ASSISTIVE EQUIPMENT
ADLS_ACUITY_SCORE: 20
RETIRED_COMMUNICATION: 0-->UNDERSTANDS/COMMUNICATES WITHOUT DIFFICULTY
ADLS_ACUITY_SCORE: 18
AMBULATION: 1-->ASSISTIVE EQUIPMENT
FALL_HISTORY_WITHIN_LAST_SIX_MONTHS: NO

## 2019-08-16 ASSESSMENT — ENCOUNTER SYMPTOMS
DIARRHEA: 1
FATIGUE: 1
ACTIVITY CHANGE: 1
CONFUSION: 0
EYE REDNESS: 0
NECK STIFFNESS: 0
HEADACHES: 0
ARTHRALGIAS: 1
SHORTNESS OF BREATH: 0
COLOR CHANGE: 0
DIFFICULTY URINATING: 0
CHILLS: 0
ABDOMINAL PAIN: 1

## 2019-08-16 NOTE — PHARMACY-ADMISSION MEDICATION HISTORY
Admission medication history interview status for the 8/16/2019 admission is complete. See Epic admission navigator for allergy information, pharmacy, prior to admission medications and immunization status.     Medication history interview sources: MAR from [facility] ([facility])    Changes made to PTA medication list (reason)  Added: Cipro and oxycodone-Per MAR  Deleted: Voltaren gel-Per MAR  Changed: Lactulose (strength of solution and directions, from 10-30 ml once daily to 30 ml TID)-Per MAR    Additional medication history information (including reliability of information, actions taken by pharmacist):None      Prior to Admission medications    Medication Sig Last Dose Taking? Auth Provider   bumetanide (BUMEX) 1 MG tablet Take 1 tablet (1 mg) by mouth daily 8/16/2019 at Unknown time Yes Leventhal, Thomas Michael, MD   Cholecalciferol (VITAMIN D-3) 1000 units CAPS Take 1,000 Units by mouth 2 times daily 8/16/2019x1 at Unknown time Yes Reported, Patient   ciprofloxacin (CIPRO) 500 MG tablet Take 500 mg by mouth 2 times daily 8/16/2019x1 at Unknown time Yes Unknown, Entered By History   famotidine (PEPCID) 20 MG tablet Take 20 mg by mouth 2 times daily  8/16/2019x1 at Unknown time Yes Reported, Patient   lactulose 20 GM/30ML SOLN Take 30 mLs by mouth 3 times daily 8/16/2019x1 at Unknown time Yes Unknown, Entered By History   levothyroxine (SYNTHROID/LEVOTHROID) 125 MCG tablet Take 125 mcg by mouth daily 8/16/2019 at Unknown time Yes Unknown, Entered By History   magnesium oxide (MAG-OX) 400 MG tablet Take 400 mg by mouth daily  8/16/2019 at Unknown time Yes Reported, Patient   oxyCODONE HCl (OXAYDO) 5 MG TABA Take 5 mg by mouth every 8 hours as needed PRN Yes Unknown, Entered By History   rifaximin (XIFAXAN) 550 MG TABS tablet Take 1 tablet (550 mg) by mouth 2 times daily 8/16/2019x1 at Unknown time Yes Leventhal, Thomas Michael, MD   spironolactone (ALDACTONE) 100 MG tablet Take 100 mg by  mouth 2 times daily 8/16/2019x1 at Unknown time Yes Unknown, Entered By History         Medication history completed by: Ebonie Godinez, PharmD IV Student

## 2019-08-16 NOTE — ED NOTES
[facility], [place]   ED Nurse to Floor Handoff     Nicci Pemberton is a 59 year old female who speaks English and lives alone,  In a TCU  They arrived in the ED by ambulance from home    ED Chief Complaint: Generalized Weakness and Abnormal Labs    ED Dx;   Final diagnoses:   Acute kidney injury (H)   Generalized muscle weakness         Needed?: No    Allergies:   Allergies   Allergen Reactions     Food      Fruits with cores and pits     Sulfa Drugs Unknown     Swollen joints     Furosemide Rash   .  Past Medical Hx:   Past Medical History:   Diagnosis Date     Anemia      Cellulitis      Cirrhosis of liver (H) 6/18/2018     Heterozygous alpha 1-antitrypsin deficiency (H)      High hepatic iron concentration determined by biopsy of liver      Liver cirrhosis secondary to ANGULO (H)      Lymphedema      Osteoarthritis      SBP (spontaneous bacterial peritonitis) (H) 8/2/2019 8/1/19 in       Baseline Mental status: WDL  Current Mental Status changes: at basesline    Infection present or suspected this encounter: cultures pending  Sepsis suspected: No  Isolation type: No active isolations     Activity level - Baseline/Home:  Stand with Assist  Activity Level - Current:   Stand with Assist    Bariatric equipment needed?: No    In the ED these meds were given:   Medications   0.9% sodium chloride BOLUS (1,000 mLs Intravenous New Bag 8/16/19 1239)     Followed by   sodium chloride 0.9% infusion (has no administration in time range)       Drips running?  No    Home pump  No    Current LDAs  Peripheral IV 08/16/19 Right (Active)   Site Assessment WDL 8/16/2019 11:18 AM   Line Status Saline locked 8/16/2019 11:18 AM   Number of days: 0       Labs results:   Labs Ordered and Resulted from Time of ED Arrival Up to the Time of Departure from the ED   CBC WITH PLATELETS DIFFERENTIAL - Abnormal; Notable for the following components:       Result Value    RBC Count 2.68 (*)      Hemoglobin 10.3 (*)     Hematocrit 30.7 (*)      (*)     MCH 38.4 (*)     Platelet Count 59 (*)     Absolute Lymphocytes 0.3 (*)     All other components within normal limits   COMPREHENSIVE METABOLIC PANEL - Abnormal; Notable for the following components:    Sodium 126 (*)     Urea Nitrogen 40 (*)     Creatinine 2.30 (*)     GFR Estimate 22 (*)     GFR Estimate If Black 26 (*)     Calcium 8.2 (*)     Bilirubin Total 24.2 (*)     Albumin 1.6 (*)     Protein Total 5.4 (*)     Alkaline Phosphatase 152 (*)     ALT 51 (*)     All other components within normal limits   LIPASE   LACTIC ACID WHOLE BLOOD   AMMONIA   UA MACROSCOPIC WITH REFLEX TO MICRO AND CULTURE   ORTHOSTATIC BLOOD PRESSURE AND PULSE   MEASURE WEIGHT   BLOOD CULTURE   BLOOD CULTURE       Imaging Studies: No results found for this or any previous visit (from the past 24 hour(s)).    Recent vital signs:   BP 95/49   Pulse 77   Temp 97.7  F (36.5  C) (Oral)   Resp 18   Wt 104.3 kg (230 lb)   SpO2 97%   BMI 42.07 kg/m      Keo Coma Scale Score: 15 (08/16/19 1109)       Cardiac Rhythm: Normal Sinus  Pt needs tele? No  Skin/wound Issues: Jaundiced    Code Status: unknown    Pain control: pt had none    Nausea control: pt had none    Abnormal labs/tests/findings requiring intervention: see EPIC    Family present during ED course? Yes   Family Comments/Social Situation comments:     Tasks needing completion: None    Virgie Denson, RN  [phone] [facility]

## 2019-08-16 NOTE — ED TRIAGE NOTES
[facility] and elevated creatinine and BUN. Hx of kidney and liver failure. Pt noted to be hypotensive en route, which is baseline for patient. Denies any pain. A&O.

## 2019-08-16 NOTE — H&P
Resident/Fellow Attestation   I, Criss Weinberg, was present with the medical student who participated in the service and in the documentation of the note.  I have verified the history and personally performed the physical exam and medical decision making.  I agree with the assessment and plan of care as documented in the note.      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     #. Acute decompensated ANGULO cirrhosis c/b recent SBP and ascites  #. Rising bilirubin  #. Umbilical hernia  Progressively worsening liver function over the last 4 months. Tbili 17.8 on 07/31, now 24.2. Differential diagnosis includes infectious vs portal vein thrombosis vs bleed. Patient is currently transplant eligible.   -hepatology following; appreciate teams help and recommendations  -most recent EGD (05/07/2019) without esophageal or gastric varices  -diagnostic paracentesis with cell count, gram stain, and fluid culture  -abdominal US with retrograde flow throughout the portal system and splenic vein, previously retrograde in the left portal vein only. Dilated, sludge containing gallbladder without evidence of acute cholecystitis.  -discontinue cephalexin (started for unclear reasons)  -PTA famotidine 20 mg BID  -PTA lactulose 30 mL BID; hold if goal 2-3 bowel movements exceeded  -PTA rifaximin 550 mg BID  -hold PTA bumetanide 1 mg and PTA spironolactone in the setting of current GAMAL  -consider starting ciprofloxacin SBP ppx if diagnostic paracentesis  unremarkable  - no empiric antibiotic coverage at this time, no clear localizable infection and workup ongoing   -INR 2.86 on 08/12/2019  -ammonia level pending  -blood culture x2 pending  -lipase 364  -CXR  -trend CMP and CBC  -daily MELD score  MELD-Na score: 33 at 8/12/2019  7:00 AM  MELD score: 31 at 8/12/2019  7:00 AM  Calculated from:  Serum Creatinine: 1.11 mg/dL at 8/12/2019  7:00 AM  Serum Sodium: 131 mmol/L at 8/12/2019  7:00 AM  Total Bilirubin: 21.1 mg/dL at 8/12/2019  7:00 AM  INR(ratio): 2.86 at 8/12/2019  7:00 AM  Age: 59 years    #. GAMAL  Likely prerenal in the setting of increased volume loss 2/2 diarrhea v hepatorenal syndrome. Low suspicion for intrarenal pathology a this time and patient without symptoms consistent with obstruction   -Fluid challenge with bolus of 25% albumin, recheck labs following   -UA with culture  -trend CMP  -consult nephrology if no renal function improvement in next 24-36 hours    #. Diarrhea c/b dehydration  Patient was receiving lactulose x3 daily and reports having two days where she had 15+ watery bowel movements per day. Her lactulose was transitioned to x2 daily, resulting in 5-6 bowel movements per day. Differential diagnosis for increased bowel movement frequency includes infectious vs iatrogenic (lactulose)    -1000 mL NS bolus x1 in ED  -discontinue 125 mL/hr NS continuous infusion  -enteric bacteria and virus panel  -c. Diff toxin B  -albumin 25% injection 100 g x1  -hold lactulose if goal 2-3 BM daily exceeded    #. Anemia   #. Thrombocytopenia  Likely 2/2 to worsening liver function given hemoglobin of 13.4 in April 2019. Differential diagnosis includes anemia of chronic disease vs liver dysfunction vs low suspicion for hemolytic and sequestration. No signs of active bleed.   -trend CBC  -transfuse RBCs if Hgb < 7.0  -transfuse platelets if < 20     #. Hypotension  Likely 2/2 to ESLD  -albumin 25 g if SBP < 80    #. Chest pain  Likely multifactorial 2/2 acute  decompensated cirrhosis and anemia. Undetermined significance given normal echo on 06/19/2019 and normal stress test.      ---------------------------------------------- PTA ----------------------------------------------    #. HTN  Most likely primary (MRI abdomen 06/27/2018 showed normal kidney structure). No current treatment needed due to ESLD.     #. Alpha 1 antitrypsin   #. Iron overload  Patient is heterozygous (MZ genotype) for alpha 1 antitrypsin with secondary iron overload    #. Hypothyroidism  -PTA levothyroxine 125 mcg daily    Diet: Regular adult diet   Fluids: 1000 mL NS bolus x1, 125 mL/hr NS continuous   DVT Prophylaxis: Low Risk/Ambulatory with no VTE prophylaxis indicated  Forrester Catheter: not present  Code Status: Full    Disposition Plan   Expected discharge: 4 - 7 days, recommended to transitional care unit once GAMAL resolved and patient energy regained or when patient reveives liver transplant.   Entered: Ryan Suresh 08/16/2019, 6:45 PM    The patient's care was discussed with the Attending Physician, Dr. Cat.    Ryan Suresh  Medical Student  [address], [place]  Pager: [phone]  Please see sticky note for cross cover information  ______________________________________________________________________    Chief Complaint   Generalized weakness and fatigue    History of Present Illness   The patient was admitted to [facility] on 7/31 for SBP and infectious enterocolitis, treated with Zosyn and Flagil, and discharged to [facility] on 8/8 with cephalexin. The patient was doing well at the TCU for the first few days, but then began to have a large number of bowel movements per day (up to 16 per patient and her ). She described the bowel movements as watery and green, without any discoloration or visible blood. This was accompanied by suprapubic abdominal pain around the time of defecation. Her lactulose frequency was  changed from x3 to x2 per day at this time, decreasing her BM frequency to 5-6 times per day. The patient states that she became very weak and tired over the last 3 days, feeling like she could sleep all day and had a decrease in appetite. She also notes intermittent substernal, non-radiating chest pain for the last 2 weeks, describing the pain as a pressure that feels like her heart is being pulled down with gravity when she stands up and walks around. She reports this pain as 5/10 in severity, relieved with rest (dobutamine stress test 6/19/2019 negative for ischemia). The patient presents today from her TCU due to a SBP in the 80's, elevated BUN and Cr, and progressive fatigue.     Review of Systems    The 10 point Review of Systems is negative other than noted in the HPI or here.    Past Medical History    I have reviewed this patient's medical history and updated it with pertinent information if needed.   Past Medical History:   Diagnosis Date     Anemia      Cellulitis      Cirrhosis of liver (H) 6/18/2018     Heterozygous alpha 1-antitrypsin deficiency (H)      High hepatic iron concentration determined by biopsy of liver      Liver cirrhosis secondary to ANGULO (H)      Lymphedema      Osteoarthritis      SBP (spontaneous bacterial peritonitis) (H) 8/2/2019 8/1/19 in [facility]      Past Surgical History   I have reviewed this patient's surgical history and updated it with pertinent information if needed.  Past Surgical History:   Procedure Laterality Date     COLONOSCOPY N/A 6/22/2018    Procedure: COLONOSCOPY;  colonoscopy;  Surgeon: Hugo Patel MD;  Location:  OR      Social History   I have reviewed this patient's social history and updated it with pertinent information if needed. Nicci Pemberton  reports that she quit smoking about 8 years ago. She has never used smokeless tobacco. She reports that she does not drink alcohol or use drugs.    Family History   I have reviewed this patient's family history  and updated it with pertinent information if needed.   Family History   Problem Relation Age of Onset     Cirrhosis No family hx of      Liver Cancer No family hx of      Prior to Admission Medications   Prior to Admission Medications   Prescriptions Last Dose Informant Patient Reported? Taking?   Cholecalciferol (VITAMIN D-3) 1000 units CAPS 8/16/2019x1 at Unknown time Nursing Home Yes Yes   Sig: Take 1,000 Units by mouth 2 times daily   bumetanide (BUMEX) 1 MG tablet 8/16/2019 at Unknown time snf No Yes   Sig: Take 1 tablet (1 mg) by mouth daily   ciprofloxacin (CIPRO) 500 MG tablet 8/16/2019x1 at Unknown time Nursing Home Yes Yes   Sig: Take 500 mg by mouth 2 times daily   famotidine (PEPCID) 20 MG tablet 8/16/2019x1 at Unknown time Nursing Home Yes Yes   Sig: Take 20 mg by mouth 2 times daily    lactulose 20 GM/30ML SOLN 8/16/2019x1 at Unknown time Nursing Home Yes Yes   Sig: Take 30 mLs by mouth 3 times daily   levothyroxine (SYNTHROID/LEVOTHROID) 125 MCG tablet 8/16/2019 at Unknown time Nursing Home Yes Yes   Sig: Take 125 mcg by mouth daily   magnesium oxide (MAG-OX) 400 MG tablet 8/16/2019 at Unknown time Nursing Home Yes Yes   Sig: Take 400 mg by mouth daily    oxyCODONE HCl (OXAYDO) 5 MG TABA PRN Nursing Home Yes Yes   Sig: Take 5 mg by mouth every 8 hours as needed   rifaximin (XIFAXAN) 550 MG TABS tablet 8/16/2019x1 at Unknown time snf No Yes   Sig: Take 1 tablet (550 mg) by mouth 2 times daily   spironolactone (ALDACTONE) 100 MG tablet 8/16/2019x1 at Unknown time Nursing Home Yes Yes   Sig: Take 100 mg by mouth 2 times daily      Facility-Administered Medications: None     Allergies   Allergies   Allergen Reactions     Food      Fruits with cores and pits     Sulfa Drugs Unknown     Swollen joints     Furosemide Rash       Physical Exam   Vital Signs: Temp: 95.9  F (35.5  C) Temp src: Oral BP: (!) 82/38 Pulse: 75   Resp: 16 SpO2: 96 % O2 Device: None (Room air)    Weight: 233 lbs 0  oz    Constitutional: awake, alert, cooperative, no apparent distress, and appears stated age, noticeably fatigued  Eyes: scleral icterus noted. Conjunctiva pale/yellow  Respiratory: No increased work of breathing, good air exchange, clear to auscultation bilaterally, no crackles or wheezing  Cardiovascular: regular rate and rhythm, normal S1 and S2, no S3 or S4, and no murmur noted  GI: normoactive bowel sounds, soft, distended, non-tender  Skin: jaundiced. No visible bruising noted.   Musculoskeletal: LE pitting edema noted bilaterally, extending up to mid thigh  Neurologic: Awake, alert, oriented to name, place and time. No asterixis noted.     Data   Data reviewed today: I reviewed all medications, new labs and imaging results over the last 24 hours. I personally reviewed no images or EKG's today.    Recent Labs   Lab 08/16/19  1119   WBC 9.1   HGB 10.3*   *   PLT 59*   *   POTASSIUM 5.0   CHLORIDE 94   CO2 25   BUN 40*   CR 2.30*   ANIONGAP 7   JACOB 8.2*   GLC 82   ALBUMIN 1.6*   PROTTOTAL 5.4*   BILITOTAL 24.2*   ALKPHOS 152*   ALT 51*   AST Canceled, Test credited   LIPASE 364     Recent Results (from the past 24 hour(s))   US Abdomen Limited w Abd/Pelvis Duplex Complete    Narrative    EXAMINATION: Limited abdominal ultrasound with Doppler 8/16/2019 2:23  PM     COMPARISON: CT 7/31/2019 and Ultrasound 3/20/2019    HISTORY: End-stage liver disease, recent SBP    TECHNIQUE: The abdomen was scanned in standard fashion with  specialized ultrasound transducer(s) using both gray-scale, color  Doppler, and spectral flow techniques.    FINDINGS:    Liver: The liver demonstrates coarse, heterogeneous parenchyma and  nodular surface contour measuring 13.6 cm in craniocaudal dimension.  No evidence of a focal hepatic mass.     Extrahepatic portal vein flow is retrograde, measuring 36 cm/sec.  Right portal vein flow is retrograde, measuring 13 cm/sec.  Left portal vein flow is retrograde, measuring 28  cm/sec.    Flow in the hepatic artery is towards the liver and:  140 cm/sec peak systolic  0.88 resistive index.     The splenic vein is patent and flow is away from the liver.  The left,  middle, and right hepatic veins are patent with flow towards the IVC.  The IVC is patent with flow towards the heart.   The visualized aorta  is not dilated.    Gallbladder: Significantly distended with large amount of layering  sludge in the neck. Decreased wall thickening compared to previous  exam.    Bile Ducts: Both the intra- and extrahepatic biliary system are of  normal caliber.  The common bile duct measures 4 mm in diameter.    Pancreas: Visualized portions of the neck and body of the pancreas are  unremarkable.     Right Kidney: 11.7 cm craniocaudally. No mass, hydronephrosis, or  shadowing stones.    Fluid: Large volume intra-abdominal ascites.      Impression    Impression:   1.  Cirrhotic morphology of the liver without focal lesion.   2.  Patent portal venous system with retrograde flow throughout the  portal system and splenic vein. Previously flow was retrograde in the  left portal vein only.  3.  Dilated, sludge containing gallbladder without evidence of acute  cholecystitis.    I have personally reviewed the examination and initial interpretation  and I agree with the findings.    YEISON CALDWELL MD   POC US Guide for Paracentesis    Impression    Limited abdominal ultrasound was performed and demonstrated an adequate fluid collection on the left side of the abdomen. Doppler of the skin demonstrated an area at this site without significant vasculature. A paracentesis at this site was subsequently performed.    Devang Pearl

## 2019-08-16 NOTE — PROCEDURES
Consult and Procedure Service - Procedure Note    Attending: Devang Pearl   Indication: Infection evaluation  Risk Assessment: High risk, coagulopathic with small but amenable pocket. However obtaining diagnostic paracentesis very important to the patient's care.  Pre-procedure diagnosis: Ascites  Post-procedure diagnosis: Ascites  Procedure name: Diagnostic paracentesis    The risks and benefits of the procedure were explained to the patient who expressed understanding and opted to proceed.  Consent was obtained and placed in the chart.  A time out was performed.  An area of ascites was located with ultrasound and marked in the left lower quadrant; the area was prepped and draped in the usual sterile fashion.  9 ml of 1% lidocaine was instilled with a 22 gauge needle and ascites located under real-time guidance. 10 ml of clear yellow colored fluid was removed and sent for analysis and the area dressed.     Patient tolerated the procedure well. Please contact the Consult and Procedure Service if any complications or concerns arise.     Devang Pearl   DOS:  08/16/19

## 2019-08-16 NOTE — PROGRESS NOTES
Admission/Transfer from: ED  2 RN skin assessment completed by: Pamela Alarcon RN and Jennifer Vega RN    Bruises on bilat upper extremities. Redness under abdominal skin folds.

## 2019-08-16 NOTE — CONSULTS
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 Hepatology consulted for further evaluation and assistance in management.     1. Acute Kidney Injury  Creatinine 2.3 from baseline 0.5-0.6. DDx includes pre-renal (in setting of diarrhea/diuretics), HRS, recurrent infection, etc. Recommend albumin challenge and holding diuretics. Careful titration of lactulose to 2-3 bowel movements daily (previously did not need lactulose to maintain 2-3 bowel movements daily given h/o chronic diarrhea per last outpatient hepatology note).     2. Decompensated Cirrhosis  3. Rising Bilirubin  Etiology: NAFLD, iron overload, A1AT MZ phenotype. MELD-Na: 33. Follows with Dr. Leventhal in hepatology clinic as outpatient. Tbili 17.8 on 7/31/19 (admission for SBP), now 22.9. Previously 6's to 8's this year.    - Hepatic Encephalpathy: History of. Was not on lactulose at last outpatient visit, but has been initiated on lactulose since then. Having significant stool output which may be contributing to GAMAL as above.    - Esophageal Varices:        *Most recent endoscopy: 4/2018 without varices    - Ascites: History of.        *Diuretics - Taking bumex 1 mg and spironolactone 100 mg BID as outpatient       *SBP: History of - recent admission 7/31 to OSH with culture negative SBP s/p antibiotic treatment and initiation of ppx ciprofloxacin, but prescription was only for 14 days from date of discharge 8/8/19.    - Coagulopathy: Present. INR 2.86 on 8/12/2019.    - Hepatoma: No focal liver lesions on U/S Abdomen 3/2019.   - Transplant Evaluation:  Currently listed for liver transplant.      Recommendations:   - Follow-up U/S Abdomen with dopplers   - Albumin 1 gm/kg (max 100 gm) of 25% x 3 days   - Blood culture x 2, UA/UCx, CXR, diagnostic paracentesis (send gram stain, culture, cell count/differential), enteric panel and CDiff PCR   - Hold diuretics   - Lactulose titrated to 2-3 bowel movements daily (likely will need to hold given chronic diarrhea); continue rifaximin   - If no improvement in renal function despite albumin in next 24-36 hours, consult nephrology   - Transplant surgery made aware of admission   - Closely monitor and document urine output   - Obtain daily MELD-Na labs (needs INR today)   - No contraindication to liver transplantation at this time   - Gastroenterology team will continue to follow with you.    Gastroenterology follow up recommendations: Follows with Dr. Leventhal in outpatient hepatology clinic.     Thank you for involving us in this patient's care. Please do not hesitate to contact the GI service with any questions or concerns.     Pt care plan discussed with Dr. Aranda, GI staff physician.    Laverne Milligan MD  Gastroenterology/Hepatology Fellow  [id number], [id number]  -------------------------------------------------------------------------------------------------------------------          Chief Complaint:   We were asked by Dr. Cat of Internal Medicine to evaluate this patient with decompensated cirrhosis, GAMAL.    History is obtained from the patient and the medical record.          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 Hepatology consulted for further evaluation and assistance in management.     Patient  reports two day history of weakness and fatigue. Sleeping much more than usual. She denies confusion, as does her . She has noted two days of increased diffuse abdominal pain, worse with walking around. Was having some intermittent abdominal pain during her last SBP admission two weeks ago. She has had a chronically decreased appetite, but is eating and drinking ok. She reports being given lactulose TID with significant stool output over the past several days - upwards of 16 bowel movements per day. No melena or hematochezia. No fevers or chills. No nausea or vomiting. She denies change in lower extremity edema. She has had a mild headache over the past two hours. She has noticed increased jaundice and scleral icterus, no pruritis. She has not had any change in urination including increased frequency or burning with urination. She has an occasional cough, no sputum production. Reports recent CXR that was normal at TCU.     In [facility] ED patient was found to have stable vital signs, afebrile without tachycardia. Labs notable for creatinine of 2.3, total bilirubin of 24.2, WBC 9.1k, hemoglobin of 10.3 (stable over past month) and platelets of 59. She was admitted for further evaluation.             Past Medical History:   Reviewed and edited as appropriate  Past Medical History:   Diagnosis Date     Anemia      Cellulitis      Cirrhosis of liver (H) 6/18/2018     Heterozygous alpha 1-antitrypsin deficiency (H)      High hepatic iron concentration determined by biopsy of liver      Liver cirrhosis secondary to ANGULO (H)      Lymphedema      Osteoarthritis      SBP (spontaneous bacterial peritonitis) (H) 8/2/2019 8/1/19 in             Past Surgical History:   Reviewed and edited as appropriate   Past Surgical History:   Procedure Laterality Date     COLONOSCOPY N/A 6/22/2018    Procedure: COLONOSCOPY;  colonoscopy;  Surgeon: Hugo Patel MD;  Location: [facility] OR            Previous Endoscopy:     Results for  orders placed or performed during the hospital encounter of 06/22/18   COLONOSCOPY   Result Value Ref Range    COLONOSCOPY       [facility]  [address] [place], [place] [address]., [place] [address] [phone]     Endoscopy Department  _______________________________________________________________________________  Patient Name: Nicci Pemberton            Procedure Date: 6/22/2018 8:08 AM  MRN: [id number]                       Account Number: [id number]  YOB: 1960              Admit Type: Outpatient  Age: 58                                Gender: Female  Note Status: Finalized                Attending MD: Seth Fulton MD  Total Sedation Time: 34 minutes with 1:1 monitoring   _______________________________________________________________________________     Procedure:           Colonoscopy  Indications:         Screening for colorectal malignant neoplasm  Providers:           Seth Fulton MD, Mina Abraham, ED, Germain Sanchez MD, MD  Referring MD:        Meghan Montes MD  Medicines:           Midazolam 3 mg IV, Fentanyl 50 micrograms IV  Complications:        No immediate complications.  _______________________________________________________________________________  Procedure:           Pre-Anesthesia Assessment:                       - Prior to the procedure, a History and Physical was                        performed, and patient medications and allergies were                        reviewed. The patient is competent. The risks and                        benefits of the procedure and the sedation options and                        risks were discussed with the patient. All questions                        were answered and informed consent was obtained. Patient                        identification and proposed procedure were verified by                        the physician in the pre-procedure area. Mental Status                        Examination: alert  and oriented. Airway Examination:                        normal oropharyngeal airway and neck mobility.                        Respiratory Examination: clear to auscultation. CV                         Examination: normal. Prophylactic Antibiotics: The                        patient does not require prophylactic antibiotics. Prior                        Anticoagulants: The patient has taken no previous                        anticoagulant or antiplatelet agents. ASA Grade                        Assessment: III - A patient with severe systemic                        disease. After reviewing the risks and benefits, the                        patient was deemed in satisfactory condition to undergo                        the procedure. The anesthesia plan was to use moderate                        sedation / analgesia (conscious sedation). Immediately                        prior to administration of medications, the patient was                        re-assessed for adequacy to receive sedatives. The heart                        rate, respiratory rate, oxygen saturations, blood                        pressure, adequacy of pulmonary ventilation, and                         response to care were monitored throughout the                        procedure. The physical status of the patient was                        re-assessed after the procedure.                       After obtaining informed consent, the colonoscope was                        passed under direct vision. Throughout the procedure,                        the patient's blood pressure, pulse, and oxygen                        saturations were monitored continuously. The Colonoscope                        was introduced through the anus and advanced to the                        terminal ileum. The colonoscopy was performed without                        difficulty. The patient tolerated the procedure well.                        The quality of the bowel  preparation was good. The                        endoscopy was advanced using the water exchange method (                        underwater ).                                                                                   Findings:        The perianal and digital rectal examinations were normal.       The terminal ileum appeared normal.       A few medium-mouthed diverticula were found in the sigmoid colon.       No additional abnormalities were found on retroflexion.                                                                                   Impression:          - The examined portion of the ileum was normal.                       - Diverticulosis in the sigmoid colon.                       - No specimens collected.  Recommendation:      - Discharge patient to home.                       - Repeat colonoscopy in 10 years for screening purposes.                       - Return to referring physician (date not yet                        determined).                                                                                     ___________________  Seth Fulton MD  6/22/2018 9:21:00 AM  I was physically present for the entire viewing portion of the exam.  __________________________  Signature of teaching sheridan Fulton MD    __________________________________  Germain Sanchez MD, MD  Number of Addenda: 0    Note Initiated On: 6/22/2018 8:08 AM  Scope In:  Scope Out:              Social History:   Reviewed and edited as appropriate  Social History     Socioeconomic History     Marital status:      Spouse name: Not on file     Number of children: Not on file     Years of education: Not on file     Highest education level: Not on file   Occupational History     Not on file   Social Needs     Financial resource strain: Not on file     Food insecurity:     Worry: Not on file     Inability: Not on file     Transportation needs:     Medical: Not on file     Non-medical: Not on  file   Tobacco Use     Smoking status: Former Smoker     Last attempt to quit:      Years since quittin.6     Smokeless tobacco: Never Used     Tobacco comment: weekend smoker    Substance and Sexual Activity     Alcohol use: No     Drug use: No     Sexual activity: Not on file   Lifestyle     Physical activity:     Days per week: Not on file     Minutes per session: Not on file     Stress: Not on file   Relationships     Social connections:     Talks on phone: Not on file     Gets together: Not on file     Attends [employer] service: Not on file     Active member of club or organization: Not on file     Attends meetings of clubs or organizations: Not on file     Relationship status: Not on file     Intimate partner violence:     Fear of current or ex partner: Not on file     Emotionally abused: Not on file     Physically abused: Not on file     Forced sexual activity: Not on file   Other Topics Concern     Parent/sibling w/ CABG, MI or angioplasty before 65F 55M? Not Asked   Social History Narrative     Not on file            Family History:   Reviewed and edited as appropriate  Family History   Problem Relation Age of Onset     Cirrhosis No family hx of      Liver Cancer No family hx of            Allergies:   Reviewed and edited as appropriate     Allergies   Allergen Reactions     Food      Fruits with cores and pits     Sulfa Drugs Unknown     Swollen joints     Furosemide Rash            Medications:     Medications Prior to Admission   Medication Sig Dispense Refill Last Dose     bumetanide (BUMEX) 1 MG tablet Take 1 tablet (1 mg) by mouth daily 90 tablet 1 Taking     [] cephALEXin (KEFLEX) 500 MG capsule Take 500 mg by mouth   Taking     Cholecalciferol (VITAMIN D-3) 1000 units CAPS Take 1,000 Units by mouth 2 times daily   Taking     diclofenac (VOLTAREN) 1 % GEL topical gel    Not Taking     famotidine (PEPCID) 20 MG tablet Take 20 mg by mouth 2 times daily Takes 40 mg at HS   Taking      lactulose (CHRONULAC) 10 GM/15ML solution TK 10 TO 30 ML PO QD  1      levothyroxine (SYNTHROID/LEVOTHROID) 125 MCG tablet Take 1 tab daily   Taking     magnesium oxide (MAG-OX) 400 MG tablet Take 400 mg by mouth daily    Taking     rifaximin (XIFAXAN) 550 MG TABS tablet Take 1 tablet (550 mg) by mouth 2 times daily 60 tablet 11 Unknown     spironolactone (ALDACTONE) 100 MG tablet Take 1 tab twice daily   Taking             Review of Systems:     A complete 10-point review of systems was performed and is negative except as noted in the HPI           Physical Exam:   BP 96/44 (BP Location: Left arm)   Pulse 79   Temp 96.4  F (35.8  C) (Oral)   Resp 16   Wt 105.7 kg (233 lb)   SpO2 94%   BMI 42.62 kg/m    Wt:   Wt Readings from Last 2 Encounters:   08/16/19 105.7 kg (233 lb)   07/11/19 100.7 kg (222 lb)      Constitutional: cooperative, pleasant, not dyspneic/diaphoretic, no acute distress  Eyes: Sclera icteric  Ears/nose/mouth/throat: Normal oropharynx without ulcers or exudate, mucus membranes moist, hearing intact  Neck: supple, thyroid normal size  CV: RRR  Respiratory: Unlabored breathing  Lymph: No axillary, submandibular, supraclavicular lymphadenopathy  Abd: Distended, nontender, no peritoneal signs  Skin: warm, perfused, jaundiced  Neuro: AAO x 3, No asterixis  Psych: Normal affect  MSK: + BLE edema, WWP         Data:   Labs and imaging below were independently reviewed and interpreted    BMP  Recent Labs   Lab 08/16/19  1119   *   POTASSIUM 5.0   CHLORIDE 94   JACOB 8.2*   CO2 25   BUN 40*   CR 2.30*   GLC 82     CBC  Recent Labs   Lab 08/16/19  1119   WBC 9.1   RBC 2.68*   HGB 10.3*   HCT 30.7*   *   MCH 38.4*   MCHC 33.6   RDW 15.0   PLT 59*     INRNo lab results found in last 7 days.  LFTs  Recent Labs   Lab 08/16/19  1119   ALKPHOS 152*   AST Canceled, Test credited   ALT 51*   BILITOTAL 24.2*   PROTTOTAL 5.4*   ALBUMIN 1.6*      PANC  Recent Labs   Lab 08/16/19  1119   LIPASE 364      MELD-Na score: 33 at 8/12/2019  7:00 AM  MELD score: 31 at 8/12/2019  7:00 AM  Calculated from:  Serum Creatinine: 1.11 mg/dL at 8/12/2019  7:00 AM  Serum Sodium: 131 mmol/L at 8/12/2019  7:00 AM  Total Bilirubin: 21.1 mg/dL at 8/12/2019  7:00 AM  INR(ratio): 2.86 at 8/12/2019  7:00 AM  Age: 59 years     Imaging: Reviewed.     Attestation:  This patient has been seen and evaluated by me, Nerissa Aranda.  Discussed with the house staff team or resident(s) and agree with the findings and plan in this note.

## 2019-08-16 NOTE — ED PROVIDER NOTES
[place] EMERGENCY DEPARTMENT ([facility])  8/16/19   History     Chief Complaint   Patient presents with     Generalized Weakness     Abnormal Labs     The history is provided by the patient, the spouse and medical records.     Nicci Pemberton is a 59 year old female who has a PMHx of ESLD, meld score of 31 and worsening, secondary to Murphy and central for liver transplant at the [facility] and recent transitional care placement following hospitalization for spontaneous bacterial peritonitis, who presents to the Emergency Department for evaluation of fatigue.  Patient reports that she has been in nutritional care unit for approximately 1 week and has felt that over the past 3 days she has felt weaker, has been sleeping more, has had a lack of appetite and generally has felt fatigued.  Patient is here with her  who contributes to the medical history.  Patient's  states that she initially did well at the TCU for the first couple of days.  Patient states that she is unsure if her bacteremia is resolved or not but continues to take antibiotics once per day.  Patient states that she did not have her ammonia levels checked at the TCU.  Patient is also endorses some limited weight gain.    Here in the ED, patient reports a low-grade fevers, some chills but no shakes.  Patient does also report intermittent abdominal pain for the past 2 weeks in the suprapubic region.  Patient notes that when she stands up after a bowel movement she feels her lower abdomen is being dragged down by gravity.  Patient denies any recent falls and continues have chronic arthralgias which are unchanged. Patient reports that she is on lactulose (initially 3 times daily now twice daily) to manage her ammonia levels and states that she has had frequent stools (up to 16 times per day per ).   is concerned that the frequency of her stools have impaired her appetite.    Per transitional care unit records,  patient had systolic blood pressure in the 80s at the transitional care facility yesterday.  Patient also had a creatinine of 1.88 2 days ago and provider there was worried about GAMAL.    I have reviewed the Medications, Allergies, Past Medical and Surgical History, and Social History in the [facility] system.    Past Medical History:   Diagnosis Date     Anemia      Cellulitis      Cirrhosis of liver (H) 2018     Heterozygous alpha 1-antitrypsin deficiency (H)      High hepatic iron concentration determined by biopsy of liver      Liver cirrhosis secondary to ANGULO (H)      Lymphedema      Osteoarthritis      SBP (spontaneous bacterial peritonitis) (H) 2019 in [place]       Past Surgical History:   Procedure Laterality Date     COLONOSCOPY N/A 2018    Procedure: COLONOSCOPY;  colonoscopy;  Surgeon: Hugo Patel MD;  Location:  OR       Family History   Problem Relation Age of Onset     Cirrhosis No family hx of      Liver Cancer No family hx of        Social History     Tobacco Use     Smoking status: Former Smoker     Last attempt to quit:      Years since quittin.6     Smokeless tobacco: Never Used     Tobacco comment: weekend smoker    Substance Use Topics     Alcohol use: No       Current Facility-Administered Medications   Medication     famotidine (PEPCID) tablet 20 mg     lactulose (CHRONULAC) solution 30 mL     levothyroxine (SYNTHROID/LEVOTHROID) tablet 125 mcg     lidocaine (LMX4) cream     lidocaine 1 % 0.1-1 mL     magnesium oxide (MAG-OX) tablet 400 mg     melatonin tablet 1 mg     naloxone (NARCAN) injection 0.1-0.4 mg     rifaximin (XIFAXAN) tablet 550 mg     sodium chloride (PF) 0.9% PF flush 3 mL     sodium chloride (PF) 0.9% PF flush 3 mL     sodium chloride 0.9% infusion     spironolactone (ALDACTONE) tablet 100 mg     vitamin D3 (CHOLECALCIFEROL) 1000 units (25 mcg) tablet 1,000 Units        Allergies   Allergen Reactions     Food      Fruits with cores and pits      Sulfa Drugs Unknown     Swollen joints     Furosemide Rash        Review of Systems   Constitutional: Positive for activity change (reduced) and fatigue. Negative for chills. Fever: subjective. Unexpected weight change: limited wt gain.   HENT: Negative for congestion.    Eyes: Negative for redness.   Respiratory: Negative for shortness of breath.    Cardiovascular: Negative for chest pain.   Gastrointestinal: Positive for abdominal pain (lower, 2 wk) and diarrhea.   Genitourinary: Negative for difficulty urinating.   Musculoskeletal: Positive for arthralgias (chronic and unchanged). Negative for neck stiffness.   Skin: Negative for color change.   Neurological: Negative for headaches.   Psychiatric/Behavioral: Negative for confusion.   All other systems reviewed and are negative.      Physical Exam   BP: 95/49  Pulse: 77  Temp: 97.7  F (36.5  C)  Resp: 18  Weight: 104.3 kg (230 lb)  SpO2: 97 %  Lying Orthostatic BP: 85/48  Lying Orthostatic Pulse: 76 bpm  Sitting Orthostatic BP: 94/53  Sitting Orthostatic Pulse: 91 bpm  Standing Orthostatic BP: 90/53  Standing Orthostatic Pulse: 84 bpm      Physical Exam   Constitutional: No distress.   HENT:   Head: Atraumatic.   Mouth/Throat: Oropharynx is clear and moist. No oropharyngeal exudate.   Eyes: Pupils are equal, round, and reactive to light. Scleral icterus is present.   Cardiovascular: Normal heart sounds and intact distal pulses.   Pulmonary/Chest: Breath sounds normal. No respiratory distress.   Abdominal: Soft. Bowel sounds are normal. She exhibits fluid wave and ascites. She exhibits no distension and no mass. There is no tenderness.   Musculoskeletal: She exhibits no edema or tenderness.   Skin: Skin is warm. No rash noted. She is not diaphoretic.       ED Course   11:11 AM  The patient was seen and examined by Hugo Lea MD in Room ED21.       Procedures          Labs Ordered and Resulted from Time of ED Arrival Up to the Time of Departure from the ED   CBC  WITH PLATELETS DIFFERENTIAL - Abnormal; Notable for the following components:       Result Value    RBC Count 2.68 (*)     Hemoglobin 10.3 (*)     Hematocrit 30.7 (*)      (*)     MCH 38.4 (*)     Platelet Count 59 (*)     Absolute Lymphocytes 0.3 (*)     All other components within normal limits   COMPREHENSIVE METABOLIC PANEL - Abnormal; Notable for the following components:    Sodium 126 (*)     Urea Nitrogen 40 (*)     Creatinine 2.30 (*)     GFR Estimate 22 (*)     GFR Estimate If Black 26 (*)     Calcium 8.2 (*)     Bilirubin Total 24.2 (*)     Albumin 1.6 (*)     Protein Total 5.4 (*)     Alkaline Phosphatase 152 (*)     ALT 51 (*)     All other components within normal limits   LIPASE   LACTIC ACID WHOLE BLOOD   AMMONIA   ORTHOSTATIC BLOOD PRESSURE AND PULSE   MEASURE WEIGHT   BLOOD CULTURE          Medications   0.9% sodium chloride BOLUS (1,000 mLs Intravenous New Bag 8/16/19 1239)     Followed by   sodium chloride 0.9% infusion ( Intravenous Rate/Dose Verify 8/16/19 1452)   lidocaine 1 % 0.1-1 mL (has no administration in time range)   lidocaine (LMX4) cream (has no administration in time range)   sodium chloride (PF) 0.9% PF flush 3 mL (has no administration in time range)   sodium chloride (PF) 0.9% PF flush 3 mL (3 mLs Intracatheter Not Given 8/16/19 1452)   naloxone (NARCAN) injection 0.1-0.4 mg (has no administration in time range)   melatonin tablet 1 mg (has no administration in time range)   vitamin D3 (CHOLECALCIFEROL) 1000 units (25 mcg) tablet 1,000 Units (has no administration in time range)   famotidine (PEPCID) tablet 20 mg (has no administration in time range)   lactulose (CHRONULAC) solution 30 mL (has no administration in time range)   levothyroxine (SYNTHROID/LEVOTHROID) tablet 125 mcg (has no administration in time range)   magnesium oxide (MAG-OX) tablet 400 mg (has no administration in time range)   rifaximin (XIFAXAN) tablet 550 mg (has no administration in time range)    spironolactone (ALDACTONE) tablet 100 mg (has no administration in time range)       Assessments & Plan (with Medical Decision Making)   59-year-old female with a history of Murphy who presents with 3-day history of progressive weakness, mild confusion found to have lower than normal blood pressure with systolic in the 80s as well as increase in creatinine of 1.882 days ago.  Differential included hypovolemia, sepsis, SBP, other infection, medication side effect.  Laboratories were obtained including blood cultures which are pending.  CBC revealed hemoglobin down from normal at 10.3.  Comprehensive metabolic panel revealed low sodium of 126, abrupt near tripling of creatinine now at 2.3 and total bilirubin markedly elevated at 24.2 over her recent values of 6.  The case was discussed at length with internal medicine who is agreed to accept the patient is an admission for further evaluation and management.    I have reviewed the nursing notes.    I have reviewed the findings, diagnosis, plan and need for follow up with the patient.    Current Discharge Medication List          Final diagnoses:   Acute kidney injury (H)   Generalized muscle weakness     IFaisal, am serving as a trained medical scribe to document services personally performed by Hugo Lea MD, based on the provider's statements to me.   Hugo BRAN MD, was physically present and have reviewed and verified the accuracy of this note documented by Faisal Martin.     8/16/2019   [facility], [place], EMERGENCY DEPARTMENT     Keny Lea MD  08/16/19 5288

## 2019-08-16 NOTE — TELEPHONE ENCOUNTER
Patient contacted and reminded of upcoming appointment.  Patient confirmed they will be attending.  Patient instructed to bring updated medications list to appointment.    Kisha Sanchez on 8/16/2019 at 1:14 PM

## 2019-08-17 ENCOUNTER — DOCUMENTATION ONLY (OUTPATIENT)
Dept: TRANSPLANT | Facility: CLINIC | Age: 59
End: 2019-08-17

## 2019-08-17 ENCOUNTER — APPOINTMENT (OUTPATIENT)
Dept: OCCUPATIONAL THERAPY | Facility: CLINIC | Age: 59
DRG: 005 | End: 2019-08-17
Payer: COMMERCIAL

## 2019-08-17 ENCOUNTER — APPOINTMENT (OUTPATIENT)
Dept: GENERAL RADIOLOGY | Facility: CLINIC | Age: 59
DRG: 005 | End: 2019-08-17
Attending: SURGERY
Payer: COMMERCIAL

## 2019-08-17 ENCOUNTER — ORGAN (OUTPATIENT)
Dept: TRANSPLANT | Facility: CLINIC | Age: 59
End: 2019-08-17

## 2019-08-17 LAB
ALBUMIN SERPL-MCNC: 2.7 G/DL (ref 3.4–5)
ALP SERPL-CCNC: 92 U/L (ref 40–150)
ALT SERPL W P-5'-P-CCNC: 34 U/L (ref 0–50)
ANION GAP SERPL CALCULATED.3IONS-SCNC: 9 MMOL/L (ref 3–14)
AST SERPL W P-5'-P-CCNC: 76 U/L (ref 0–45)
BILIRUB SERPL-MCNC: 24.6 MG/DL (ref 0.2–1.3)
BUN SERPL-MCNC: 46 MG/DL (ref 7–30)
C COLI+JEJUNI+LARI FUSA STL QL NAA+PROBE: NOT DETECTED
CALCIUM SERPL-MCNC: 8.4 MG/DL (ref 8.5–10.1)
CHLORIDE SERPL-SCNC: 97 MMOL/L (ref 94–109)
CO2 SERPL-SCNC: 25 MMOL/L (ref 20–32)
CREAT SERPL-MCNC: 1.96 MG/DL (ref 0.52–1.04)
EC STX1 GENE STL QL NAA+PROBE: NOT DETECTED
EC STX2 GENE STL QL NAA+PROBE: NOT DETECTED
ENTERIC PATHOGEN COMMENT: NORMAL
ERYTHROCYTE [DISTWIDTH] IN BLOOD BY AUTOMATED COUNT: 15 % (ref 10–15)
ERYTHROCYTE [DISTWIDTH] IN BLOOD BY AUTOMATED COUNT: 15 % (ref 10–15)
GFR SERPL CREATININE-BSD FRML MDRD: 27 ML/MIN/{1.73_M2}
GLUCOSE BLDC GLUCOMTR-MCNC: 75 MG/DL (ref 70–99)
GLUCOSE SERPL-MCNC: 64 MG/DL (ref 70–99)
HCT VFR BLD AUTO: 22.2 % (ref 35–47)
HCT VFR BLD AUTO: 23 % (ref 35–47)
HGB BLD-MCNC: 7.1 G/DL (ref 11.7–15.7)
HGB BLD-MCNC: 7.6 G/DL (ref 11.7–15.7)
INR PPP: 3.46 (ref 0.86–1.14)
MCH RBC QN AUTO: 38.4 PG (ref 26.5–33)
MCH RBC QN AUTO: 38.6 PG (ref 26.5–33)
MCHC RBC AUTO-ENTMCNC: 32 G/DL (ref 31.5–36.5)
MCHC RBC AUTO-ENTMCNC: 33 G/DL (ref 31.5–36.5)
MCV RBC AUTO: 117 FL (ref 78–100)
MCV RBC AUTO: 120 FL (ref 78–100)
NOROV GI+II ORF1-ORF2 JNC STL QL NAA+PR: NOT DETECTED
PLATELET # BLD AUTO: 30 10E9/L (ref 150–450)
PLATELET # BLD AUTO: 39 10E9/L (ref 150–450)
POTASSIUM SERPL-SCNC: 4.8 MMOL/L (ref 3.4–5.3)
PROT SERPL-MCNC: 4.8 G/DL (ref 6.8–8.8)
RBC # BLD AUTO: 1.85 10E12/L (ref 3.8–5.2)
RBC # BLD AUTO: 1.97 10E12/L (ref 3.8–5.2)
RVA NSP5 STL QL NAA+PROBE: NOT DETECTED
SALMONELLA SP RPOD STL QL NAA+PROBE: NOT DETECTED
SHIGELLA SP+EIEC IPAH STL QL NAA+PROBE: NOT DETECTED
SODIUM SERPL-SCNC: 130 MMOL/L (ref 133–144)
V CHOL+PARA RFBL+TRKH+TNAA STL QL NAA+PR: NOT DETECTED
WBC # BLD AUTO: 4.6 10E9/L (ref 4–11)
WBC # BLD AUTO: 5.2 10E9/L (ref 4–11)
Y ENTERO RECN STL QL NAA+PROBE: NOT DETECTED

## 2019-08-17 PROCEDURE — 86901 BLOOD TYPING SEROLOGIC RH(D): CPT | Performed by: SURGERY

## 2019-08-17 PROCEDURE — 36415 COLL VENOUS BLD VENIPUNCTURE: CPT | Performed by: STUDENT IN AN ORGANIZED HEALTH CARE EDUCATION/TRAINING PROGRAM

## 2019-08-17 PROCEDURE — 25000128 H RX IP 250 OP 636

## 2019-08-17 PROCEDURE — 97140 MANUAL THERAPY 1/> REGIONS: CPT | Mod: GO

## 2019-08-17 PROCEDURE — 86900 BLOOD TYPING SEROLOGIC ABO: CPT | Performed by: SURGERY

## 2019-08-17 PROCEDURE — 25800030 ZZH RX IP 258 OP 636

## 2019-08-17 PROCEDURE — 40001071 ZZH STATISTICAL VASC ACCESS NURSE TIME, 1-15 MINUTES

## 2019-08-17 PROCEDURE — 93005 ELECTROCARDIOGRAM TRACING: CPT

## 2019-08-17 PROCEDURE — 84702 CHORIONIC GONADOTROPIN TEST: CPT | Performed by: SURGERY

## 2019-08-17 PROCEDURE — 97530 THERAPEUTIC ACTIVITIES: CPT | Mod: GO

## 2019-08-17 PROCEDURE — 82150 ASSAY OF AMYLASE: CPT | Performed by: SURGERY

## 2019-08-17 PROCEDURE — 86850 RBC ANTIBODY SCREEN: CPT | Performed by: SURGERY

## 2019-08-17 PROCEDURE — 12000001 ZZH R&B MED SURG/OB UMMC

## 2019-08-17 PROCEDURE — 85027 COMPLETE CBC AUTOMATED: CPT | Performed by: STUDENT IN AN ORGANIZED HEALTH CARE EDUCATION/TRAINING PROGRAM

## 2019-08-17 PROCEDURE — 85730 THROMBOPLASTIN TIME PARTIAL: CPT | Performed by: SURGERY

## 2019-08-17 PROCEDURE — 40000141 ZZH STATISTIC PERIPHERAL IV START W/O US GUIDANCE

## 2019-08-17 PROCEDURE — 97165 OT EVAL LOW COMPLEX 30 MIN: CPT | Mod: GO

## 2019-08-17 PROCEDURE — 85025 COMPLETE CBC W/AUTO DIFF WBC: CPT | Performed by: SURGERY

## 2019-08-17 PROCEDURE — 80053 COMPREHEN METABOLIC PANEL: CPT | Performed by: SURGERY

## 2019-08-17 PROCEDURE — 86644 CMV ANTIBODY: CPT | Performed by: SURGERY

## 2019-08-17 PROCEDURE — 85027 COMPLETE CBC AUTOMATED: CPT | Performed by: INTERNAL MEDICINE

## 2019-08-17 PROCEDURE — 25000132 ZZH RX MED GY IP 250 OP 250 PS 637

## 2019-08-17 PROCEDURE — 86923 COMPATIBILITY TEST ELECTRIC: CPT | Performed by: SURGERY

## 2019-08-17 PROCEDURE — P9047 ALBUMIN (HUMAN), 25%, 50ML: HCPCS

## 2019-08-17 PROCEDURE — 84100 ASSAY OF PHOSPHORUS: CPT | Performed by: SURGERY

## 2019-08-17 PROCEDURE — 86645 CMV ANTIBODY IGM: CPT | Performed by: SURGERY

## 2019-08-17 PROCEDURE — 40000065 ZZH STATISTIC EKG NON-CHARGEABLE

## 2019-08-17 PROCEDURE — 36415 COLL VENOUS BLD VENIPUNCTURE: CPT | Performed by: INTERNAL MEDICINE

## 2019-08-17 PROCEDURE — 25000128 H RX IP 250 OP 636: Performed by: STUDENT IN AN ORGANIZED HEALTH CARE EDUCATION/TRAINING PROGRAM

## 2019-08-17 PROCEDURE — 71046 X-RAY EXAM CHEST 2 VIEWS: CPT

## 2019-08-17 PROCEDURE — 85384 FIBRINOGEN ACTIVITY: CPT | Performed by: SURGERY

## 2019-08-17 PROCEDURE — 86665 EPSTEIN-BARR CAPSID VCA: CPT | Performed by: SURGERY

## 2019-08-17 PROCEDURE — 83735 ASSAY OF MAGNESIUM: CPT | Performed by: SURGERY

## 2019-08-17 PROCEDURE — 25000132 ZZH RX MED GY IP 250 OP 250 PS 637: Performed by: STUDENT IN AN ORGANIZED HEALTH CARE EDUCATION/TRAINING PROGRAM

## 2019-08-17 PROCEDURE — 99233 SBSQ HOSP IP/OBS HIGH 50: CPT | Mod: GC | Performed by: INTERNAL MEDICINE

## 2019-08-17 PROCEDURE — 85610 PROTHROMBIN TIME: CPT | Performed by: STUDENT IN AN ORGANIZED HEALTH CARE EDUCATION/TRAINING PROGRAM

## 2019-08-17 PROCEDURE — P9047 ALBUMIN (HUMAN), 25%, 50ML: HCPCS | Performed by: STUDENT IN AN ORGANIZED HEALTH CARE EDUCATION/TRAINING PROGRAM

## 2019-08-17 PROCEDURE — 36415 COLL VENOUS BLD VENIPUNCTURE: CPT | Performed by: SURGERY

## 2019-08-17 PROCEDURE — 93010 ELECTROCARDIOGRAM REPORT: CPT | Mod: 59 | Performed by: INTERNAL MEDICINE

## 2019-08-17 PROCEDURE — 85610 PROTHROMBIN TIME: CPT | Performed by: SURGERY

## 2019-08-17 PROCEDURE — 00000146 ZZHCL STATISTIC GLUCOSE BY METER IP

## 2019-08-17 PROCEDURE — 80053 COMPREHEN METABOLIC PANEL: CPT | Performed by: STUDENT IN AN ORGANIZED HEALTH CARE EDUCATION/TRAINING PROGRAM

## 2019-08-17 RX ORDER — PIPERACILLIN SODIUM, TAZOBACTAM SODIUM 3; .375 G/15ML; G/15ML
3.38 INJECTION, POWDER, LYOPHILIZED, FOR SOLUTION INTRAVENOUS ONCE
Status: COMPLETED | OUTPATIENT
Start: 2019-08-17 | End: 2019-08-18

## 2019-08-17 RX ORDER — ALBUMIN (HUMAN) 12.5 G/50ML
25 SOLUTION INTRAVENOUS ONCE
Status: COMPLETED | OUTPATIENT
Start: 2019-08-17 | End: 2019-08-17

## 2019-08-17 RX ORDER — LIDOCAINE 40 MG/G
CREAM TOPICAL
Status: DISCONTINUED | OUTPATIENT
Start: 2019-08-17 | End: 2019-08-18 | Stop reason: HOSPADM

## 2019-08-17 RX ORDER — ACETAMINOPHEN 325 MG/1
325 TABLET ORAL EVERY 4 HOURS PRN
Status: DISCONTINUED | OUTPATIENT
Start: 2019-08-17 | End: 2019-08-18

## 2019-08-17 RX ORDER — LACTULOSE 10 G/15ML
30 SOLUTION ORAL 2 TIMES DAILY PRN
Status: DISCONTINUED | OUTPATIENT
Start: 2019-08-17 | End: 2019-08-19

## 2019-08-17 RX ORDER — PIPERACILLIN SODIUM, TAZOBACTAM SODIUM 2; .25 G/10ML; G/10ML
2.25 INJECTION, POWDER, LYOPHILIZED, FOR SOLUTION INTRAVENOUS
Status: DISCONTINUED | OUTPATIENT
Start: 2019-08-17 | End: 2019-08-18 | Stop reason: HOSPADM

## 2019-08-17 RX ORDER — ALBUMIN (HUMAN) 12.5 G/50ML
100 SOLUTION INTRAVENOUS ONCE
Status: COMPLETED | OUTPATIENT
Start: 2019-08-17 | End: 2019-08-17

## 2019-08-17 RX ADMIN — ALBUMIN HUMAN 25 G: 0.25 SOLUTION INTRAVENOUS at 02:42

## 2019-08-17 RX ADMIN — ACETAMINOPHEN 325 MG: 325 TABLET, FILM COATED ORAL at 02:21

## 2019-08-17 RX ADMIN — FAMOTIDINE 20 MG: 20 TABLET ORAL at 09:05

## 2019-08-17 RX ADMIN — MAGNESIUM OXIDE TAB 400 MG (241.3 MG ELEMENTAL MG) 400 MG: 400 (241.3 MG) TAB at 07:49

## 2019-08-17 RX ADMIN — FAMOTIDINE 20 MG: 20 TABLET ORAL at 19:35

## 2019-08-17 RX ADMIN — SODIUM CHLORIDE, POTASSIUM CHLORIDE, SODIUM LACTATE AND CALCIUM CHLORIDE 1000 ML: 600; 310; 30; 20 INJECTION, SOLUTION INTRAVENOUS at 05:21

## 2019-08-17 RX ADMIN — RIFAXIMIN 550 MG: 550 TABLET ORAL at 07:49

## 2019-08-17 RX ADMIN — ALBUMIN HUMAN 100 G: 0.25 SOLUTION INTRAVENOUS at 09:52

## 2019-08-17 RX ADMIN — RIFAXIMIN 550 MG: 550 TABLET ORAL at 19:35

## 2019-08-17 RX ADMIN — MELATONIN 1000 UNITS: at 07:49

## 2019-08-17 RX ADMIN — MELATONIN 1000 UNITS: at 19:36

## 2019-08-17 RX ADMIN — LEVOTHYROXINE SODIUM 125 MCG: 0.12 TABLET ORAL at 07:49

## 2019-08-17 RX ADMIN — ACETAMINOPHEN 325 MG: 325 TABLET, FILM COATED ORAL at 13:50

## 2019-08-17 RX ADMIN — LACTULOSE 30 ML: 20 SOLUTION ORAL at 07:49

## 2019-08-17 ASSESSMENT — ACTIVITIES OF DAILY LIVING (ADL)
ADLS_ACUITY_SCORE: 19
ADLS_ACUITY_SCORE: 22
ADLS_ACUITY_SCORE: 21
ADLS_ACUITY_SCORE: 21
ADLS_ACUITY_SCORE: 20
ADLS_ACUITY_SCORE: 19

## 2019-08-17 NOTE — PROGRESS NOTES
[facility], [place]    History and Physical - [place] 2 Service     Date of Admission:  8/16/2019    Assessment & Plan   Nicci Pemberton is a 59 year old woman with a PMH of ESLD 2/2 ANGULO c/b h/o HE and SBP, HTN, A1AT and iron overload, hypothyroidism who was admitted on 8/16 for acute decompensated cirrhosis, GAMAL, and dehydration/weakness/fatigue in the setting of diarrhea.     #. Acute decompensated ANGULO cirrhosis c/b recent SBP and ascites  #. Rising bilirubin  #. Umbilical hernia  Patient's decompensated ANGULO cirrhosis has been c/b episodes of HE (on lactulose and rifaximin) and SBP. No e/o varices on most recent EGD.   Progressively worsening liver function over the last 4 months. Tbili 17.8 on 07/31, now 24.2. Differential diagnosis includes infectious vs portal vein thrombosis vs bleed. Patient is currently transplant eligible. Hgb downtrended 8/17 likely dilutional in the setting of IVF hydration, no signs of active bleed (no dark stools or blood in BM)  - Holding PTA Bumex and Spironolactone   -hepatology following; appreciate teams help and recommendations  -consider starting SBP prophylaxis, appreciate hepatology recs  - no empiric antibiotic coverage at this time, no clear localizable infection and workup ongoing   -daily MELD score  MELD-Na score: 39 at 8/17/2019  6:49 AM  MELD score: 39 at 8/17/2019  6:49 AM  Calculated from:  Serum Creatinine: 1.96 mg/dL at 8/17/2019  6:49 AM  Serum Sodium: 130 mmol/L at 8/17/2019  6:49 AM  Total Bilirubin: 24.6 mg/dL at 8/17/2019  6:49 AM  INR(ratio): 3.46 at 8/17/2019  6:49 AM  Age: 59 years    #. AGMAL  Likely prerenal in the setting of increased volume loss 2/2 diarrhea v hepatorenal syndrome. Low suspicion for intrarenal pathology a this time and patient without symptoms consistent with obstruction   -Fluid challenge with bolus of 25% albumin, kidney function improving with hydration   -UA bland  -trend CMP  -consult nephrology if no  renal function improvement in next 24-36 hours    #. Diarrhea c/b dehydration  Likely in the setting of poor lactulose titration. Cdiff and enteric pathogen panels are negative. Did resolve some with prior titration of lactulose. Will try to titrate to 3-4BM per day.    #. Anemia   #. Thrombocytopenia  Likely 2/2 to worsening liver function given hemoglobin of 13.4 in April 2019. Differential diagnosis includes anemia of chronic disease vs liver dysfunction vs low suspicion for hemolytic and sequestration. No signs of active bleed.   -trend CBC  -transfuse RBCs if Hgb < 7.0  -transfuse platelets if < 20     #. Hypotension  Likely 2/2 to ESLD  -albumin 25 g if SBP < 80  -avoid fluid boluses      #. Chest pain  Likely multifactorial 2/2 acute decompensated cirrhosis and anemia. Undetermined significance given normal echo on 06/19/2019 and normal stress test.      ---------------------------------------------- PTA ----------------------------------------------    #. HTN  Most likely primary (MRI abdomen 06/27/2018 showed normal kidney structure). No current treatment needed due to ESLD.     #. Alpha 1 antitrypsin   #. Iron overload  Patient is heterozygous (MZ genotype) for alpha 1 antitrypsin with secondary iron overload    #. Hypothyroidism  -PTA levothyroxine 125 mcg daily    Diet: Regular adult diet   Fluids: Albumin 100g 25%  DVT Prophylaxis: Low Risk/Ambulatory with no VTE prophylaxis indicated  Forrester Catheter: not present  Code Status: Full    Disposition Plan   Expected discharge: 4 - 7 days, recommended to transitional care unit once GAMAL resolved and patient energy regained or when patient reveives liver transplant.   Entered: Criss Weinberg MD 08/17/2019, 12:16 PM    The patient's care was discussed with the Attending Physician, Dr. Cat.    Criss Weinberg, PGY1  [phone]  ______________________________________________________________________    Interval History  Overall, patient is  feeling well today. She has no new complaints and denies fevers, chills, SOB, abdominal pain, dysuria. She denies blood in her stools and dark stools. She has only had 2 BM this morning. She is eating well and enjoying her breakfast.       Physical Exam   Vital Signs: Temp: 96.5  F (35.8  C) Temp src: Axillary BP: 106/54 Pulse: 80   Resp: 16 SpO2: 92 % O2 Device: None (Room air) Oxygen Delivery: 1/2 LPM  Weight: 233 lbs 0 oz    Constitutional: awake, alert, cooperative, no apparent distress, and appears stated age  Eyes: scleral icterus noted. Conjunctiva pale/yellow  Respiratory: No increased work of breathing, good air exchange, clear to auscultation bilaterally, no crackles or wheezing  Cardiovascular: regular rate and rhythm, normal S1 and S2, no S3 or S4, and no murmur noted  GI: normoactive bowel sounds, soft, distended but not tense, non-tender  Skin: jaundiced. No visible bruising noted.   Musculoskeletal: LE pitting edema noted bilaterally, extending up to mid thigh  Neurologic: Awake, alert, oriented to name, place and time. No asterixis noted.     Data   Data reviewed today: I reviewed all medications, new labs and imaging results over the last 24 hours. I personally reviewed no images or EKG's today.    Recent Labs   Lab 08/17/19  0858 08/17/19  0649 08/16/19  1119   WBC 5.2 4.6 9.1   HGB 7.6* 7.1* 10.3*   * 120* 115*   PLT 39* 30* 59*   INR  --  3.46*  --    NA  --  130* 126*   POTASSIUM  --  4.8 5.0   CHLORIDE  --  97 94   CO2  --  25 25   BUN  --  46* 40*   CR  --  1.96* 2.30*   ANIONGAP  --  9 7   JACOB  --  8.4* 8.2*   GLC  --  64* 82   ALBUMIN  --  2.7* 1.6*   PROTTOTAL  --  4.8* 5.4*   BILITOTAL  --  24.6* 24.2*   ALKPHOS  --  92 152*   ALT  --  34 51*   AST  --  76* Canceled, Test credited   LIPASE  --   --  364     Recent Results (from the past 24 hour(s))   US Abdomen Limited w Abd/Pelvis Duplex Complete    Narrative    EXAMINATION: Limited abdominal ultrasound with Doppler 8/16/2019  2:23  PM     COMPARISON: CT 7/31/2019 and Ultrasound 3/20/2019    HISTORY: End-stage liver disease, recent SBP    TECHNIQUE: The abdomen was scanned in standard fashion with  specialized ultrasound transducer(s) using both gray-scale, color  Doppler, and spectral flow techniques.    FINDINGS:    Liver: The liver demonstrates coarse, heterogeneous parenchyma and  nodular surface contour measuring 13.6 cm in craniocaudal dimension.  No evidence of a focal hepatic mass.     Extrahepatic portal vein flow is retrograde, measuring 36 cm/sec.  Right portal vein flow is retrograde, measuring 13 cm/sec.  Left portal vein flow is retrograde, measuring 28 cm/sec.    Flow in the hepatic artery is towards the liver and:  140 cm/sec peak systolic  0.88 resistive index.     The splenic vein is patent and flow is away from the liver.  The left,  middle, and right hepatic veins are patent with flow towards the IVC.  The IVC is patent with flow towards the heart.   The visualized aorta  is not dilated.    Gallbladder: Significantly distended with large amount of layering  sludge in the neck. Decreased wall thickening compared to previous  exam.    Bile Ducts: Both the intra- and extrahepatic biliary system are of  normal caliber.  The common bile duct measures 4 mm in diameter.    Pancreas: Visualized portions of the neck and body of the pancreas are  unremarkable.     Right Kidney: 11.7 cm craniocaudally. No mass, hydronephrosis, or  shadowing stones.    Fluid: Large volume intra-abdominal ascites.      Impression    Impression:   1.  Cirrhotic morphology of the liver without focal lesion.   2.  Patent portal venous system with retrograde flow throughout the  portal system and splenic vein. Previously flow was retrograde in the  left portal vein only.  3.  Dilated, sludge containing gallbladder without evidence of acute  cholecystitis.    I have personally reviewed the examination and initial interpretation  and I agree with the  findings.    YEISON CALDWELL MD   POC US Guide for Paracentesis    Impression    Limited abdominal ultrasound was performed and demonstrated an adequate fluid collection on the left side of the abdomen. Doppler of the skin demonstrated an area at this site without significant vasculature. A paracentesis at this site was subsequently performed.    Devang Pearl     XR Chest 2 Views    Narrative    PA and lateral chest x-ray    Comparisons: 6/27/2018    HISTORY: End-stage liver disease on transplant list. Worsening LFTs,  amylase and acute kidney injury.    FINDINGS: There are new small bilateral pleural effusions. There are  new bibasilar linear opacities. Cardiac silhouette and pulmonary  vascularity appear unremarkable. There are prominent loops of bowel in  the upper abdomen.      Impression    IMPRESSION:  1. New small bilateral pleural effusions with bibasilar  consolidation/atelectasis.  2. Prominent gas-filled loops of bowel in the upper abdomen. Consider  abdominal series for further evaluation.    ALEX SANCHEZ MD

## 2019-08-17 NOTE — PROGRESS NOTES
Updated MELD score with 08/17 AM labs. New score - 39, previous 33.    Successfully updated registration for Nicci Pemberton (SSN: [id number]) . MELD Lab Values were successfully updated. The new Lab Score is 39.

## 2019-08-17 NOTE — PROGRESS NOTES
"   08/17/19 1329   Rehab Discipline   Discipline OT   Type of Visit   Type of visit Initial Edema Evaluation   General Information   Start of care 08/17/19   Referring physician Criss Weinberg MD   Orders Evaluate and treat as indicated   Order date 08/16/19   Medical diagnosis ESLD   Onset of illness / date of surgery 08/16/19   Edema onset   (acute on chronic)   Affected body parts LLE;RLE;RUE;LUE;Trunk   Edema etiology comments elevated creatinine (2.3), hypoalbuminemia (1.6), ESLD, volume overload, GAMAL   Pertinent history of current problem (PT: include personal factors and/or comorbidities that impact the POC; OT: include additional occupational profile info) Nicci Pemberton is a 59 year old woman with a PMH of ESLD 2/2 ANGULO c/b h/o HE and SBP, HTN, A1AT and iron overload, hypothyroidism who was admitted on 8/16 for acute decompensated cirrhosis, GAMAL, and dehydration/weakness/fatigue in the setting of diarrhea.    Prior level of functional mobility Ambulating at TCU with FWW.    Prior treatment Complete decongestive therapy;Gradient compression bandaging  (at OP and TCU)   Patient role / employment history Retired   Living environment House / [facility]   Living environment comments Lives with . Pt has 2 TRISHA, no railing. Pt has a deck with 12 steps with bilateral railings, which is the entrance she normally takes. It is split level from the main entrance, 6 up/6 down, bilateral railings. If she enters from back entrance no steps. Pt has a tub shower. They are looking into getting a double shower chair.    Current assistive devices Walker   General observations Requested OT/PT orders, activity orders.    Subjective Report   Patient report of symptoms Legs feel \"heavy, difficult to \"   Precautions   Precautions comments low platelets (59), will patient   Patient / Family Goals   Patient / family goals statement Pt did not state.    Pain   Pain comments pain with breathing   Cognitive Status "   Orientation Orientation to person, place and time   Level of consciousness Alert   Cognitive status comments Pt declines feeling confused.    Edema Exam / Assessment   Skin condition Pitting;Dryness;Intact   Skin condition comments Pt with dusky, dry B LEs with bumpy skin. Skin wrinkled, legs columnar, significant creasing noted in ankles. Pt with mod-max edema in legs and thighs, some trunk edema and edema in elbows bilaterally. No open areas noted. Pt with rani from old cyst above R knee.  Pt with quarter sized red bruise on L knee cap. Scattered red bruising on R knee. Pt with very dry feet, especially on plantar size. 2+ soft pitting distally. More firm feeling 1+/2+ pitting into thighs. Pt has attended OP edema and was receiving edema services in TCU.    Pitting 2+   Capillary refill Symmetrical   Dorsal pedal pulse Symmetrical   Range of Motion   ROM comments B UE WNL   Strength   Strength comments B UE appears functional, at least   Activities of Daily Living   Activities of Daily Living Pt reports she was mostly sedentary at home PTA due to knee pain and poor activity tolerance. Pt states she hasn't been home since ~August 4. She and her  are both retired. Pt was doing all IADLs and ADLs I prior to August 4. Was able to go grocery shopping as her  would help her and she would take breaks. Pt enjoys reading and watching TV. Has 2 adult children in the [place] [facility] [place].    Sensory   Sensory perception comments Pt denies n/t   Planned Edema Interventions   Planned edema interventions Gradient compression bandaging;Fit for compression garment;Exercises;Precautions to prevent infection / exacerbation;Education;ADL training;Skin care / precautions;Home management program development   Clinical Impression   Criteria for skilled therapeutic intervention met Yes   Therapy diagnosis Decreased ADL I   Influenced by the following impairments / conditions Stage 2;Edema   Assessment of Occupational  Performance 3-5 Performance Deficits   Identified Performance Deficits dressing, toileting, bathing, home management   Clinical Decision Making (Complexity) Low complexity   Treatment Frequency 5x/week  (BID OT/edema)   Treatment duration 1 week   Patient / family and/or staff in agreement with plan of care Yes   Risks and benefits of therapy have been explained Yes   Clinical impression comments Pt to benefit from skilled OT to address ADL/IADL deficits and B LE edema. See daily flowsheet for details regarding tx provided.    Goals   Edema Eval Goals (IP) See plan of care for patient goals   Total Evaluation Time   OT Eval, Low Complexity Minutes (33939) 8

## 2019-08-17 NOTE — PROGRESS NOTES
"   08/17/19 1030   Values Beliefs and Spiritual Care   C: Community: In support of your spiritual health, is there someone we may contact for you? (identify all that apply)   ([facility] 8/17)   Visit Information   Visit Made By Staff    Type of Visit On-call;Staff consultation/triage   Visited Patient   Interventions   Basic Spiritual Interventions    introduction/orientation to Spiritual Health Services;Assessment of spiritual needs/resources;Prayer;Reflective conversation   Advanced Assessments/Interventions   Presenting Concerns/Issues Spiritual/Religion/emotional support;Grief and adjustment issues   SPIRITUAL HEALTH SERVICES  SPIRITUAL ASSESSMENT Progress Note  [facility] ([place]) 5A   ON-CALL VISIT    PRIMARY FOCUS:     Support for coping    ILLNESS CIRCUMSTANCES:   On call visit due to pt request for  support on admission.  Reviewed documentation. Reflective conversation shared with Nicci which integrated elements of illness and family narratives.     Context of Serious Illness/Symptom(s) - Nicci shared some of her illness journey and the challenge of staying positive amidst the \"ups and downs\" of her 'score' that moves her priority on the transplant list. I engaged her in conversation around her marycarmen, sources of strength and coping, ideas for how to prepare herself and her family should a transplant become available, and shared a written prayer at Nicci's request.    Resources for Support - her  and children    DISTRESS:     Emotional/Spiritual/Existential Distress - Nicci teared up as she considered what it would mean for her children not to be baptized, stating, \"If I go and they are not baptized then I will never be able to see them again on the other side.\"     Alevism Distress - fears of what would happen if her children are not baptized    Social/Cultural/Economic Distress -     SPIRITUAL/Mandaen COPING:     Jewish/Marycarmen - Raised Denominational, spoke of her current belief in " God although not attending any [employer]    Spiritual Practice(s) - prayer and choosing to be positive were noted as sources of coping and strength    Emotional/Relational/Existential Connections - Nicci spoke of her  and family as supportive, and her trust in her medical team and the transplant surgeons.    GOALS OF CARE:    Goals of Care - Not Discussed     Meaning/Sense-Making - Nicci is working to maintaine balance and hopefulness amidst the ups and downs, changes in her health    PLAN: I will inform the unit  of care provided, and Nicci's interest in exploring having her adult children baptized here.    Maranda Garrett MDiv  Associate   Pager [phone]

## 2019-08-17 NOTE — PLAN OF CARE
Time: 2552-2784  Reason for admission/Dx:   Generalized muscle weakness [M62.81]  Acute kidney injury (H) [N17.9]  [Vitals]: /46 (BP Location: Right leg)   Pulse 85   Temp 97.3  F (36.3  C) (Oral)   Resp 16   Wt 105.7 kg (233 lb)   SpO2 92%   BMI 42.62 kg/m    [Pain/PRN/s]: Complained of chest discomfort @1400, relieved by PRN tylenol and sitting in chair  [Respiratory]: WDL  [Cardiac]: WDL ex diminished in bases. BP stable this shift.  [Neuros]: WDL  [GI]:Orders Placed This Encounter      2 Gram Sodium Diet  Moderate appetite today. No nausea  []: WDL ex 4-5 loose stool today due to lactulose   [Skin/Wounds]: Yellow skin and sclera, scattered ecchymosis and severe edema in lower extremities   [Lines]: Peripheral IV 08/16/19 Right (Active)   Site Assessment WDL 8/17/2019  8:00 AM   Line Status Saline locked 8/17/2019  8:00 AM   Phlebitis Scale 0-->no symptoms 8/17/2019  8:00 AM   Infiltration Scale 0 8/17/2019  8:00 AM   Infiltration Site Treatment Method  None 8/17/2019  8:00 AM   Dressing Intervention New dressing  8/17/2019  8:00 AM   Number of days: 1     [Infusions]: 100 mg (4 bags) albumin administered today.   [Activity]: Up to bathroom and sitting in chair, SBA [Labs/Lactic]:   Lab Results   Component Value Date    HGB 7.6 08/17/2019    CR 1.96 08/17/2019    CR 2.30 08/16/2019    PLT 39 08/17/2019    HCT 23.0 08/17/2019    WBC 5.2 08/17/2019    Change in labs likely due to dilution from IVF.        Lactic Acid (mmol/L)   Date Value   08/16/2019 1.9      [BG]:   Glucose Values Bedside Glucose (mg/dl )  GLUCOSE   Latest Ref Rng & Units - 70 - 99 mg/dL   8/17/2019 -- 75   8/17/2019 -- 64(L)   8/16/2019 -- 82   Some recent data might be hidden     [Electrolytes]:   Potassium (mmol/L)   Date Value   08/17/2019 4.8       Sodium   Date Value Ref Range Status   08/17/2019 130 (L) 133 - 144 mmol/L Final       Shift changes: Pt teary today and having difficulty coping with her illness, lymphedema  visited and wrapped legs.   Plan: Possible liver transplant pt, continue to monitor lab workups and plan of care.

## 2019-08-17 NOTE — PLAN OF CARE
Pt A&O. Desatted to 89% on RA, currently on 1/2L O2 NC satting 92-96%. Soft BPs. IV albumin and 1 L LR bolus given. Up w A1 and walker. Complains of R shoulder pain. Heat packs and PRN tylenol given. R PIV-SL. Voiding. 2 BMs this shift. Bilateral LE edematous. Will cont to monitor.

## 2019-08-17 NOTE — PLAN OF CARE
Discharge Planner OT   Patient plan for discharge: Unstated   Current status: Educated pt on OT role/POC. Facilitated increased ADL I through transfers supine <> seated EOB, pivot transfer to chair. Pt SBA for bed mobility, close CGA x1 for pivot transfer due to minimal foot clearance and kyphotic posture   Barriers to return to prior living situation: Medical status, limited strength and endurance, mobility   Recommendations for discharge: Pending medical course, likely TCU  Rationale for recommendations: Pt is significantly below baseline and would benefit from continued therapy to increase strength and endurance for ADL I.   Ot: 5A       Entered by: Tosha Nelson 08/17/2019 2:40 PM     Edema: Pt with significant BLE edema, 2+ soft pitting. Initiated GCB to BLE's, plan to wear 24-48 hours, however, please remove if compression causes pain, numbness or tingling. Pt will require further follow up in OP or TCU setting for eventual transition into compression garment.

## 2019-08-17 NOTE — PLAN OF CARE
Patient admitted to 5A from the emergency room.  Patient admitted with liver failure, GAMAL, increasing bili, weakness and fatigue. Settled patient into their room, shown call light, tv, mealtimes etc. Oriented to unit. Vitals obtained. Admission completed. Patient alert and oriented x4. Pt ambulated to the bathroom with the assist of one and a walker. Pt had two BMs. Stool sent to lab for r/o c-diff. Pt had a diagnostic bedside para. Continue monitoring patient and will notifying MD with any concerns.  Follow MD orders for cares and medications.

## 2019-08-17 NOTE — PROGRESS NOTES
[facility]    Hepatology Follow-up    CC: Weakness, GAMAL    Dx: Decompensated cirrhosis   GAMAL   Hepatic Encephalopathy   Recent history of SBP   Listed for transplant    24 hour events:  One episode of hypotension overnight, received bolus of albumin  Good UOP    Subjective:  This morning feels somewhat better  Denies any abdominal pain  Has been having loose BMs, no melena or hematochezia  No fevers or chills     Patient denies fevers, sweats or chills.    Medications  Current Facility-Administered Medications   Medication Dose Route Frequency     famotidine  20 mg Oral BID     lactated ringers  1,000 mL Intravenous Once     lactulose  30 mL Oral BID     levothyroxine  125 mcg Oral Daily     magnesium oxide  400 mg Oral Daily     rifaximin  550 mg Oral BID     sodium chloride (PF)  3 mL Intracatheter Q8H     vitamin D3  1,000 Units Oral BID       Review of systems  A 10-point review of systems was negative.    Examination  /43 (BP Location: Right leg)   Pulse 81   Temp 97.1  F (36.2  C) (Oral)   Resp 16   Wt 105.7 kg (233 lb)   SpO2 95%   BMI 42.62 kg/m      Intake/Output Summary (Last 24 hours) at 8/17/2019 0705  Last data filed at 8/17/2019 0004  Gross per 24 hour   Intake 640 ml   Output 675 ml   Net -35 ml       Gen- well, NAD, A+Ox3, normal color  CVS- RRR  RS- CTA  Abd- soft, distended with ascites, non tender  Extr- LE edema present  Neuro- alert and oriented X 3, no asterixis present  Skin- no rash  Psych- normal mood  Lym- no LAD    Laboratory  Laboratory  Lab Results   Component Value Date     08/17/2019    POTASSIUM 4.8 08/17/2019    CHLORIDE 97 08/17/2019    CO2 25 08/17/2019    BUN 46 08/17/2019    CR 1.96 08/17/2019       Lab Results   Component Value Date    BILITOTAL 24.6 08/17/2019    ALT 34 08/17/2019    AST 76 08/17/2019    ALKPHOS 92 08/17/2019       Lab Results   Component Value Date    WBC 5.2 08/17/2019    HGB 7.6 08/17/2019     08/17/2019     PLT 39 08/17/2019       Lab Results   Component Value Date    INR 3.46 08/17/2019         Radiology  CXR reviewed - small bilateral pleural effusions  Abd US with doppler reviewed - portal venous system is patent, retrograde flow through the portal system which is new compared to previous ultrasound    Assessment  59 year old F with history of obesity, lymphedema, and decompensated cirrhosis secondary to NAFLD, iron overload, and A1AT MZ phenotype, c/b ascites, HE. She was admitted for fatigue, GAMAL and worsening bilirubin.  GAMAL likely prerenal given improvement with albumin challenge. Ddx also include HRS.    1. GAMAL - elevated to 2.3 on admission from baseline of 0.6.  2. Decompensated Cirrhosis - MELD on admission was 33.    - HE: Has prior history of this. On lactulose, had significant stool output   - EV: none in recent EGD   - Ascites: takes diuretics at home (on hold now due to GAMAL)   - SBP hx: Recent history 7/31. S/p treatment. Labs from 8/16 negative. Will need SBP ppx.   - Coagulopathy: INR today is : 3.46   - US: Negative for HCC   - Currently listed for transplant    MELD-Na score: 39 at 8/17/2019  6:49 AM  MELD score: 39 at 8/17/2019  6:49 AM  Calculated from:  Serum Creatinine: 1.96 mg/dL at 8/17/2019  6:49 AM  Serum Sodium: 130 mmol/L at 8/17/2019  6:49 AM  Total Bilirubin: 24.6 mg/dL at 8/17/2019  6:49 AM  INR(ratio): 3.46 at 8/17/2019  6:49 AM  Age: 59 years      Recommendations  - Continue albumin challenge as renal function is improving. Total 3 days with 100 g / day.  - Infectious workup has been largely negative. Continue to follow blood cultures  - Hold diuretics  - If no improvement in renal function, consider nephrology consultation  - Please obtain daily MELD labs  - Lactulose to titrate 3-4 BMs. Continue Rifaximin.  - Listed for transplant. No contraindications    Patient seen and discussed with Dr Manoj GA   GI Fellow  [phone]  Attestation:  This patient has been seen and  evaluated by me, Nerissa Aranda.  Discussed with the house staff team or resident(s) and agree with the findings and plan in this note.

## 2019-08-17 NOTE — PROVIDER NOTIFICATION
Notified [facility] crosscover of pt's BP, 80/39. Pt is asymptomatic. No new orders at this time. However, if pt's BP drops anymore, albumin will be administered.

## 2019-08-18 ENCOUNTER — APPOINTMENT (OUTPATIENT)
Dept: GENERAL RADIOLOGY | Facility: CLINIC | Age: 59
DRG: 005 | End: 2019-08-18
Attending: TRANSPLANT SURGERY
Payer: COMMERCIAL

## 2019-08-18 ENCOUNTER — APPOINTMENT (OUTPATIENT)
Dept: OCCUPATIONAL THERAPY | Facility: CLINIC | Age: 59
DRG: 005 | End: 2019-08-18
Payer: COMMERCIAL

## 2019-08-18 ENCOUNTER — APPOINTMENT (OUTPATIENT)
Dept: GENERAL RADIOLOGY | Facility: CLINIC | Age: 59
DRG: 005 | End: 2019-08-18
Attending: SURGERY
Payer: COMMERCIAL

## 2019-08-18 ENCOUNTER — ANESTHESIA EVENT (OUTPATIENT)
Dept: SURGERY | Facility: CLINIC | Age: 59
DRG: 005 | End: 2019-08-18
Payer: COMMERCIAL

## 2019-08-18 ENCOUNTER — APPOINTMENT (OUTPATIENT)
Dept: GENERAL RADIOLOGY | Facility: CLINIC | Age: 59
DRG: 005 | End: 2019-08-18
Payer: COMMERCIAL

## 2019-08-18 ENCOUNTER — ANESTHESIA (OUTPATIENT)
Dept: SURGERY | Facility: CLINIC | Age: 59
DRG: 005 | End: 2019-08-18
Payer: COMMERCIAL

## 2019-08-18 ENCOUNTER — APPOINTMENT (OUTPATIENT)
Dept: ULTRASOUND IMAGING | Facility: CLINIC | Age: 59
DRG: 005 | End: 2019-08-18
Attending: SURGERY
Payer: COMMERCIAL

## 2019-08-18 LAB
ALBUMIN SERPL-MCNC: 1.7 G/DL (ref 3.4–5)
ALBUMIN SERPL-MCNC: 3.3 G/DL (ref 3.4–5)
ALBUMIN SERPL-MCNC: 3.4 G/DL (ref 3.4–5)
ALP SERPL-CCNC: 43 U/L (ref 40–150)
ALP SERPL-CCNC: 86 U/L (ref 40–150)
ALP SERPL-CCNC: 87 U/L (ref 40–150)
ALT SERPL W P-5'-P-CCNC: 190 U/L (ref 0–50)
ALT SERPL W P-5'-P-CCNC: 29 U/L (ref 0–50)
ALT SERPL W P-5'-P-CCNC: 34 U/L (ref 0–50)
AMYLASE SERPL-CCNC: 45 U/L (ref 30–110)
ANGLE RATE OF CLOT GROWTH: 38.8 DEG (ref 59–74)
ANGLE RATE OF CLOT GROWTH: 49.8 DEG (ref 59–74)
ANGLE RATE OF CLOT GROWTH: 61.9 DEG (ref 59–74)
ANGLE RATE OF CLOT GROWTH: 65.7 DEG (ref 59–74)
ANGLE RATE OF CLOT GROWTH: 69.3 DEG (ref 59–74)
ANGLE RATE OF CLOT GROWTH: 71.5 DEG (ref 59–74)
ANGLE RATE OF CLOT GROWTH: 72.6 DEG (ref 59–74)
ANGLE RATE OF CLOT GROWTH: NORMAL DEG (ref 59–74)
ANION GAP SERPL CALCULATED.3IONS-SCNC: 10 MMOL/L (ref 3–14)
ANION GAP SERPL CALCULATED.3IONS-SCNC: 17 MMOL/L (ref 3–14)
ANION GAP SERPL CALCULATED.3IONS-SCNC: 9 MMOL/L (ref 3–14)
ANISOCYTOSIS BLD QL SMEAR: SLIGHT
APTT PPP: 103 SEC (ref 22–37)
APTT PPP: 181 SEC (ref 22–37)
APTT PPP: 42 SEC (ref 22–37)
APTT PPP: 66 SEC (ref 22–37)
AST SERPL W P-5'-P-CCNC: 407 U/L (ref 0–45)
AST SERPL W P-5'-P-CCNC: 68 U/L (ref 0–45)
AST SERPL W P-5'-P-CCNC: 75 U/L (ref 0–45)
B-HCG SERPL-ACNC: <1 IU/L (ref 0–5)
BACTERIA SPEC CULT: NO GROWTH
BASE DEFICIT BLDA-SCNC: 0.7 MMOL/L
BASE DEFICIT BLDA-SCNC: 2.9 MMOL/L
BASE DEFICIT BLDA-SCNC: 3.6 MMOL/L
BASE DEFICIT BLDA-SCNC: 5.7 MMOL/L
BASE DEFICIT BLDA-SCNC: 5.8 MMOL/L
BASE DEFICIT BLDA-SCNC: 6.2 MMOL/L
BASE DEFICIT BLDA-SCNC: 6.7 MMOL/L
BASE DEFICIT BLDA-SCNC: 8.2 MMOL/L
BASE DEFICIT BLDA-SCNC: 8.5 MMOL/L
BASE EXCESS BLDA CALC-SCNC: 0.9 MMOL/L
BASOPHILS # BLD AUTO: 0 10E9/L (ref 0–0.2)
BASOPHILS # BLD AUTO: 0 10E9/L (ref 0–0.2)
BASOPHILS NFR BLD AUTO: 0 %
BASOPHILS NFR BLD AUTO: 0.4 %
BILIRUB DIRECT SERPL-MCNC: 2.7 MG/DL (ref 0–0.2)
BILIRUB SERPL-MCNC: 26.9 MG/DL (ref 0.2–1.3)
BILIRUB SERPL-MCNC: 27 MG/DL (ref 0.2–1.3)
BILIRUB SERPL-MCNC: 8.6 MG/DL (ref 0.2–1.3)
BLD PROD TYP BPU: NORMAL
BLD UNIT ID BPU: 0
BLOOD PRODUCT CODE: NORMAL
BPU ID: NORMAL
BUN SERPL-MCNC: 28 MG/DL (ref 7–30)
BUN SERPL-MCNC: 45 MG/DL (ref 7–30)
BUN SERPL-MCNC: 46 MG/DL (ref 7–30)
CA-I BLD-MCNC: 3.4 MG/DL (ref 4.4–5.2)
CA-I BLD-MCNC: 3.7 MG/DL (ref 4.4–5.2)
CA-I BLD-MCNC: 4.2 MG/DL (ref 4.4–5.2)
CA-I BLD-MCNC: 4.4 MG/DL (ref 4.4–5.2)
CA-I BLD-MCNC: 5.1 MG/DL (ref 4.4–5.2)
CA-I BLD-MCNC: 5.5 MG/DL (ref 4.4–5.2)
CA-I BLD-MCNC: 5.8 MG/DL (ref 4.4–5.2)
CA-I BLD-MCNC: 6 MG/DL (ref 4.4–5.2)
CALCIUM SERPL-MCNC: 10 MG/DL (ref 8.5–10.1)
CALCIUM SERPL-MCNC: 8.4 MG/DL (ref 8.5–10.1)
CALCIUM SERPL-MCNC: 8.7 MG/DL (ref 8.5–10.1)
CHLORIDE SERPL-SCNC: 104 MMOL/L (ref 94–109)
CHLORIDE SERPL-SCNC: 97 MMOL/L (ref 94–109)
CHLORIDE SERPL-SCNC: 98 MMOL/L (ref 94–109)
CI HYPERCOAGULATION INDEX: 1.1 RATIO (ref 0–3)
CI HYPERCOAGULATION INDEX: 2 RATIO (ref 0–3)
CI HYPERCOAGULATION INDEX: 2.1 RATIO (ref 0–3)
CI HYPERCOAGULATION INDEX: NORMAL RATIO (ref 0–3)
CI HYPOCOAGULATION INDEX: 1.1 RATIO (ref 0–3)
CI HYPOCOAGULATION INDEX: 1.3 RATIO (ref 0–3)
CI HYPOCOAGULATION INDEX: 10.4 RATIO (ref 0–3)
CI HYPOCOAGULATION INDEX: 5.9 RATIO (ref 0–3)
CI HYPOCOAGULATION INDEX: NORMAL RATIO (ref 0–3)
CLOT LYSIS 30M P MA LENFR BLD TEG: 0 % (ref 0–8)
CLOT LYSIS 30M P MA LENFR BLD TEG: 37 % (ref 0–8)
CLOT LYSIS 30M P MA LENFR BLD TEG: NORMAL % (ref 0–8)
CLOT STRENGTH BLD TEG: 2.8 KD/SC (ref 5.3–13.2)
CLOT STRENGTH BLD TEG: 2.8 KD/SC (ref 5.3–13.2)
CLOT STRENGTH BLD TEG: 3.6 KD/SC (ref 5.3–13.2)
CLOT STRENGTH BLD TEG: 6.1 KD/SC (ref 5.3–13.2)
CLOT STRENGTH BLD TEG: 6.9 KD/SC (ref 5.3–13.2)
CLOT STRENGTH BLD TEG: 8.9 KD/SC (ref 5.3–13.2)
CLOT STRENGTH BLD TEG: 9.9 KD/SC (ref 5.3–13.2)
CLOT STRENGTH BLD TEG: NORMAL KD/SC (ref 5.3–13.2)
CMV IGG SERPL QL IA: >8 AI (ref 0–0.8)
CMV IGM SERPL QL IA: 0.2 AI (ref 0–0.8)
CO2 SERPL-SCNC: 17 MMOL/L (ref 20–32)
CO2 SERPL-SCNC: 22 MMOL/L (ref 20–32)
CO2 SERPL-SCNC: 23 MMOL/L (ref 20–32)
CREAT SERPL-MCNC: 1.31 MG/DL (ref 0.52–1.04)
CREAT SERPL-MCNC: 2.04 MG/DL (ref 0.52–1.04)
CREAT SERPL-MCNC: 2.16 MG/DL (ref 0.52–1.04)
DIFFERENTIAL METHOD BLD: ABNORMAL
DIFFERENTIAL METHOD BLD: ABNORMAL
EBV VCA IGG SER QL IA: >8 AI (ref 0–0.8)
EBV VCA IGM SER QL IA: 0.6 AI (ref 0–0.8)
EOSINOPHIL # BLD AUTO: 0 10E9/L (ref 0–0.7)
EOSINOPHIL # BLD AUTO: 0.1 10E9/L (ref 0–0.7)
EOSINOPHIL NFR BLD AUTO: 0 %
EOSINOPHIL NFR BLD AUTO: 2.5 %
ERYTHROCYTE [DISTWIDTH] IN BLOOD BY AUTOMATED COUNT: 15.2 % (ref 10–15)
ERYTHROCYTE [DISTWIDTH] IN BLOOD BY AUTOMATED COUNT: 16 % (ref 10–15)
ERYTHROCYTE [DISTWIDTH] IN BLOOD BY AUTOMATED COUNT: 18.1 % (ref 10–15)
FIBRINOGEN PPP-MCNC: 142 MG/DL (ref 200–420)
FIBRINOGEN PPP-MCNC: 170 MG/DL (ref 200–420)
FIBRINOGEN PPP-MCNC: 184 MG/DL (ref 200–420)
FIBRINOGEN PPP-MCNC: 187 MG/DL (ref 200–420)
FIBRINOGEN PPP-MCNC: 225 MG/DL (ref 200–420)
FIBRINOGEN PPP-MCNC: 63 MG/DL (ref 200–420)
FIBRINOGEN PPP-MCNC: 85 MG/DL (ref 200–420)
GFR SERPL CREATININE-BSD FRML MDRD: 24 ML/MIN/{1.73_M2}
GFR SERPL CREATININE-BSD FRML MDRD: 26 ML/MIN/{1.73_M2}
GFR SERPL CREATININE-BSD FRML MDRD: 44 ML/MIN/{1.73_M2}
GLUCOSE BLD-MCNC: 100 MG/DL (ref 70–99)
GLUCOSE BLD-MCNC: 107 MG/DL (ref 70–99)
GLUCOSE BLD-MCNC: 121 MG/DL (ref 70–99)
GLUCOSE BLD-MCNC: 161 MG/DL (ref 70–99)
GLUCOSE BLD-MCNC: 169 MG/DL (ref 70–99)
GLUCOSE BLD-MCNC: 171 MG/DL (ref 70–99)
GLUCOSE BLD-MCNC: 176 MG/DL (ref 70–99)
GLUCOSE BLD-MCNC: 191 MG/DL (ref 70–99)
GLUCOSE BLD-MCNC: 69 MG/DL (ref 70–99)
GLUCOSE BLDC GLUCOMTR-MCNC: 138 MG/DL (ref 70–99)
GLUCOSE BLDC GLUCOMTR-MCNC: 154 MG/DL (ref 70–99)
GLUCOSE BLDC GLUCOMTR-MCNC: 165 MG/DL (ref 70–99)
GLUCOSE SERPL-MCNC: 169 MG/DL (ref 70–99)
GLUCOSE SERPL-MCNC: 68 MG/DL (ref 70–99)
GLUCOSE SERPL-MCNC: 91 MG/DL (ref 70–99)
GRAM STN SPEC: NORMAL
HBA1C MFR BLD: 5 % (ref 0–5.6)
HCO3 BLD-SCNC: 16 MMOL/L (ref 21–28)
HCO3 BLD-SCNC: 16 MMOL/L (ref 21–28)
HCO3 BLD-SCNC: 17 MMOL/L (ref 21–28)
HCO3 BLD-SCNC: 18 MMOL/L (ref 21–28)
HCO3 BLD-SCNC: 19 MMOL/L (ref 21–28)
HCO3 BLD-SCNC: 20 MMOL/L (ref 21–28)
HCO3 BLD-SCNC: 20 MMOL/L (ref 21–28)
HCO3 BLD-SCNC: 21 MMOL/L (ref 21–28)
HCO3 BLD-SCNC: 23 MMOL/L (ref 21–28)
HCO3 BLD-SCNC: 24 MMOL/L (ref 21–28)
HCT VFR BLD AUTO: 21.3 % (ref 35–47)
HCT VFR BLD AUTO: 23 % (ref 35–47)
HCT VFR BLD AUTO: 35.7 % (ref 35–47)
HGB BLD-MCNC: 12 G/DL (ref 11.7–15.7)
HGB BLD-MCNC: 6.6 G/DL (ref 11.7–15.7)
HGB BLD-MCNC: 6.9 G/DL (ref 11.7–15.7)
HGB BLD-MCNC: 7 G/DL (ref 11.7–15.7)
HGB BLD-MCNC: 7 G/DL (ref 11.7–15.7)
HGB BLD-MCNC: 7.3 G/DL (ref 11.7–15.7)
HGB BLD-MCNC: 7.4 G/DL (ref 11.7–15.7)
HGB BLD-MCNC: 7.4 G/DL (ref 11.7–15.7)
HGB BLD-MCNC: 7.6 G/DL (ref 11.7–15.7)
HGB BLD-MCNC: 9.5 G/DL (ref 11.7–15.7)
HGB BLD-MCNC: 9.5 G/DL (ref 11.7–15.7)
HGB BLD-MCNC: 9.7 G/DL (ref 11.7–15.7)
IMM GRANULOCYTES # BLD: 0.1 10E9/L (ref 0–0.4)
IMM GRANULOCYTES NFR BLD: 2.1 %
INR PPP: 1.88 (ref 0.86–1.14)
INR PPP: 2.12 (ref 0.86–1.14)
INR PPP: 2.26 (ref 0.86–1.14)
INR PPP: 2.52 (ref 0.86–1.14)
INR PPP: 2.79 (ref 0.86–1.14)
INR PPP: 3.38 (ref 0.86–1.14)
INR PPP: 3.64 (ref 0.86–1.14)
K TIME TO SPEC CLOT STRENGTH: 1.1 MIN (ref 1–3)
K TIME TO SPEC CLOT STRENGTH: 1.3 MIN (ref 1–3)
K TIME TO SPEC CLOT STRENGTH: 1.7 MIN (ref 1–3)
K TIME TO SPEC CLOT STRENGTH: 2.1 MIN (ref 1–3)
K TIME TO SPEC CLOT STRENGTH: 2.2 MIN (ref 1–3)
K TIME TO SPEC CLOT STRENGTH: 3.8 MIN (ref 1–3)
K TIME TO SPEC CLOT STRENGTH: 5.2 MIN (ref 1–3)
K TIME TO SPEC CLOT STRENGTH: NORMAL MIN (ref 1–3)
LACTATE BLD-SCNC: 1.7 MMOL/L (ref 0.7–2)
LACTATE BLD-SCNC: 10.2 MMOL/L (ref 0.7–2)
LACTATE BLD-SCNC: 3.1 MMOL/L (ref 0.7–2)
LACTATE BLD-SCNC: 3.8 MMOL/L (ref 0.7–2)
LACTATE BLD-SCNC: 6.5 MMOL/L (ref 0.7–2)
LACTATE BLD-SCNC: 7.5 MMOL/L (ref 0.7–2)
LACTATE BLD-SCNC: 8 MMOL/L (ref 0.7–2)
LACTATE BLD-SCNC: 8.7 MMOL/L (ref 0.7–2)
LACTATE BLD-SCNC: 9.3 MMOL/L (ref 0.7–2)
LY60 LYSIS AT 60 MINUTES: 0 % (ref 0–15)
LY60 LYSIS AT 60 MINUTES: 0.3 % (ref 0–15)
LY60 LYSIS AT 60 MINUTES: 0.9 % (ref 0–15)
LY60 LYSIS AT 60 MINUTES: 2.5 % (ref 0–15)
LY60 LYSIS AT 60 MINUTES: 67 % (ref 0–15)
LY60 LYSIS AT 60 MINUTES: NORMAL % (ref 0–15)
LYMPHOCYTES # BLD AUTO: 0.1 10E9/L (ref 0.8–5.3)
LYMPHOCYTES # BLD AUTO: 0.4 10E9/L (ref 0.8–5.3)
LYMPHOCYTES NFR BLD AUTO: 0.8 %
LYMPHOCYTES NFR BLD AUTO: 7.9 %
Lab: NORMAL
MA MAXIMUM CLOT STRENGTH: 36 MM (ref 55–74)
MA MAXIMUM CLOT STRENGTH: 36.1 MM (ref 55–74)
MA MAXIMUM CLOT STRENGTH: 42.2 MM (ref 55–74)
MA MAXIMUM CLOT STRENGTH: 54.9 MM (ref 55–74)
MA MAXIMUM CLOT STRENGTH: 57.9 MM (ref 55–74)
MA MAXIMUM CLOT STRENGTH: 64 MM (ref 55–74)
MA MAXIMUM CLOT STRENGTH: 66.4 MM (ref 55–74)
MA MAXIMUM CLOT STRENGTH: NORMAL MM (ref 55–74)
MAGNESIUM SERPL-MCNC: 2.5 MG/DL (ref 1.6–2.3)
MCH RBC QN AUTO: 30.5 PG (ref 26.5–33)
MCH RBC QN AUTO: 37.4 PG (ref 26.5–33)
MCH RBC QN AUTO: 39 PG (ref 26.5–33)
MCHC RBC AUTO-ENTMCNC: 32.2 G/DL (ref 31.5–36.5)
MCHC RBC AUTO-ENTMCNC: 32.4 G/DL (ref 31.5–36.5)
MCHC RBC AUTO-ENTMCNC: 33.6 G/DL (ref 31.5–36.5)
MCV RBC AUTO: 116 FL (ref 78–100)
MCV RBC AUTO: 120 FL (ref 78–100)
MCV RBC AUTO: 91 FL (ref 78–100)
MONOCYTES # BLD AUTO: 0.5 10E9/L (ref 0–1.3)
MONOCYTES # BLD AUTO: 0.8 10E9/L (ref 0–1.3)
MONOCYTES NFR BLD AUTO: 15.8 %
MONOCYTES NFR BLD AUTO: 5.9 %
NEUTROPHILS # BLD AUTO: 3.4 10E9/L (ref 1.6–8.3)
NEUTROPHILS # BLD AUTO: 7.8 10E9/L (ref 1.6–8.3)
NEUTROPHILS NFR BLD AUTO: 71.3 %
NEUTROPHILS NFR BLD AUTO: 93.3 %
NRBC # BLD AUTO: 0 10*3/UL
NRBC BLD AUTO-RTO: 0 /100
NUM BPU REQUESTED: 12
NUM BPU REQUESTED: 17
NUM BPU REQUESTED: 40
O2/TOTAL GAS SETTING VFR VENT: 100 %
O2/TOTAL GAS SETTING VFR VENT: 32 %
O2/TOTAL GAS SETTING VFR VENT: 33 %
O2/TOTAL GAS SETTING VFR VENT: 34 %
O2/TOTAL GAS SETTING VFR VENT: 45 %
O2/TOTAL GAS SETTING VFR VENT: 53 %
O2/TOTAL GAS SETTING VFR VENT: 57 %
O2/TOTAL GAS SETTING VFR VENT: 90 %
O2/TOTAL GAS SETTING VFR VENT: ABNORMAL %
O2/TOTAL GAS SETTING VFR VENT: ABNORMAL %
PCO2 BLD: 27 MM HG (ref 35–45)
PCO2 BLD: 27 MM HG (ref 35–45)
PCO2 BLD: 29 MM HG (ref 35–45)
PCO2 BLD: 29 MM HG (ref 35–45)
PCO2 BLD: 30 MM HG (ref 35–45)
PCO2 BLD: 31 MM HG (ref 35–45)
PCO2 BLD: 32 MM HG (ref 35–45)
PCO2 BLD: 33 MM HG (ref 35–45)
PCO2 BLD: 33 MM HG (ref 35–45)
PCO2 BLD: 39 MM HG (ref 35–45)
PH BLD: 7.32 PH (ref 7.35–7.45)
PH BLD: 7.35 PH (ref 7.35–7.45)
PH BLD: 7.37 PH (ref 7.35–7.45)
PH BLD: 7.37 PH (ref 7.35–7.45)
PH BLD: 7.39 PH (ref 7.35–7.45)
PH BLD: 7.39 PH (ref 7.35–7.45)
PH BLD: 7.42 PH (ref 7.35–7.45)
PH BLD: 7.47 PH (ref 7.35–7.45)
PH BLD: 7.48 PH (ref 7.35–7.45)
PH BLD: 7.5 PH (ref 7.35–7.45)
PHOSPHATE SERPL-MCNC: 3.8 MG/DL (ref 2.5–4.5)
PLATELET # BLD AUTO: 103 10E9/L (ref 150–450)
PLATELET # BLD AUTO: 116 10E9/L (ref 150–450)
PLATELET # BLD AUTO: 142 10E9/L (ref 150–450)
PLATELET # BLD AUTO: 146 10E9/L (ref 150–450)
PLATELET # BLD AUTO: 155 10E9/L (ref 150–450)
PLATELET # BLD AUTO: 29 10E9/L (ref 150–450)
PLATELET # BLD AUTO: 32 10E9/L (ref 150–450)
PLATELET # BLD EST: ABNORMAL 10*3/UL
PO2 BLD: 173 MM HG (ref 80–105)
PO2 BLD: 176 MM HG (ref 80–105)
PO2 BLD: 364 MM HG (ref 80–105)
PO2 BLD: 69 MM HG (ref 80–105)
PO2 BLD: 76 MM HG (ref 80–105)
PO2 BLD: 78 MM HG (ref 80–105)
PO2 BLD: 84 MM HG (ref 80–105)
PO2 BLD: 93 MM HG (ref 80–105)
PO2 BLD: 96 MM HG (ref 80–105)
PO2 BLD: 97 MM HG (ref 80–105)
POTASSIUM BLD-SCNC: 3.6 MMOL/L (ref 3.4–5.3)
POTASSIUM BLD-SCNC: 3.9 MMOL/L (ref 3.4–5.3)
POTASSIUM BLD-SCNC: 3.9 MMOL/L (ref 3.4–5.3)
POTASSIUM BLD-SCNC: 4 MMOL/L (ref 3.4–5.3)
POTASSIUM BLD-SCNC: 4.2 MMOL/L (ref 3.4–5.3)
POTASSIUM BLD-SCNC: 4.5 MMOL/L (ref 3.4–5.3)
POTASSIUM SERPL-SCNC: 4.2 MMOL/L (ref 3.4–5.3)
POTASSIUM SERPL-SCNC: 4.5 MMOL/L (ref 3.4–5.3)
POTASSIUM SERPL-SCNC: 4.6 MMOL/L (ref 3.4–5.3)
PROT SERPL-MCNC: 3.4 G/DL (ref 6.8–8.8)
PROT SERPL-MCNC: 5.2 G/DL (ref 6.8–8.8)
PROT SERPL-MCNC: 5.4 G/DL (ref 6.8–8.8)
R TIME UNTIL CLOT FORMS: 12.3 MIN (ref 4–9)
R TIME UNTIL CLOT FORMS: 2.3 MIN (ref 4–9)
R TIME UNTIL CLOT FORMS: 4.2 MIN (ref 4–9)
R TIME UNTIL CLOT FORMS: 5.2 MIN (ref 4–9)
R TIME UNTIL CLOT FORMS: 5.9 MIN (ref 4–9)
R TIME UNTIL CLOT FORMS: 6.9 MIN (ref 4–9)
R TIME UNTIL CLOT FORMS: 7.5 MIN (ref 4–9)
R TIME UNTIL CLOT FORMS: NORMAL MIN (ref 4–9)
RBC # BLD AUTO: 1.77 10E12/L (ref 3.8–5.2)
RBC # BLD AUTO: 1.98 10E12/L (ref 3.8–5.2)
RBC # BLD AUTO: 3.94 10E12/L (ref 3.8–5.2)
SODIUM BLD-SCNC: 127 MMOL/L (ref 133–144)
SODIUM BLD-SCNC: 130 MMOL/L (ref 133–144)
SODIUM BLD-SCNC: 131 MMOL/L (ref 133–144)
SODIUM BLD-SCNC: 132 MMOL/L (ref 133–144)
SODIUM BLD-SCNC: 133 MMOL/L (ref 133–144)
SODIUM BLD-SCNC: 134 MMOL/L (ref 133–144)
SODIUM SERPL-SCNC: 129 MMOL/L (ref 133–144)
SODIUM SERPL-SCNC: 129 MMOL/L (ref 133–144)
SODIUM SERPL-SCNC: 138 MMOL/L (ref 133–144)
SPECIMEN SOURCE: NORMAL
SPECIMEN SOURCE: NORMAL
TRANSFUSION STATUS PATIENT QL: NORMAL
WBC # BLD AUTO: 4.8 10E9/L (ref 4–11)
WBC # BLD AUTO: 5 10E9/L (ref 4–11)
WBC # BLD AUTO: 8.4 10E9/L (ref 4–11)

## 2019-08-18 PROCEDURE — 36415 COLL VENOUS BLD VENIPUNCTURE: CPT | Performed by: SURGERY

## 2019-08-18 PROCEDURE — 25000131 ZZH RX MED GY IP 250 OP 636 PS 637: Performed by: SURGERY

## 2019-08-18 PROCEDURE — 25800025 ZZH RX 258: Performed by: SURGERY

## 2019-08-18 PROCEDURE — 85384 FIBRINOGEN ACTIVITY: CPT | Performed by: SURGERY

## 2019-08-18 PROCEDURE — 3E1M39Z IRRIGATION OF PERITONEAL CAVITY USING DIALYSATE, PERCUTANEOUS APPROACH: ICD-10-PCS | Performed by: INTERNAL MEDICINE

## 2019-08-18 PROCEDURE — 88304 TISSUE EXAM BY PATHOLOGIST: CPT | Performed by: TRANSPLANT SURGERY

## 2019-08-18 PROCEDURE — 80048 BASIC METABOLIC PNL TOTAL CA: CPT | Performed by: SURGERY

## 2019-08-18 PROCEDURE — 36000068 ZZH SURGERY LEVEL 5 1ST 30 MIN - UMMC: Performed by: TRANSPLANT SURGERY

## 2019-08-18 PROCEDURE — 94002 VENT MGMT INPAT INIT DAY: CPT

## 2019-08-18 PROCEDURE — 87102 FUNGUS ISOLATION CULTURE: CPT | Performed by: TRANSPLANT SURGERY

## 2019-08-18 PROCEDURE — 83036 HEMOGLOBIN GLYCOSYLATED A1C: CPT | Performed by: STUDENT IN AN ORGANIZED HEALTH CARE EDUCATION/TRAINING PROGRAM

## 2019-08-18 PROCEDURE — 85025 COMPLETE CBC W/AUTO DIFF WBC: CPT | Performed by: SURGERY

## 2019-08-18 PROCEDURE — 76700 US EXAM ABDOM COMPLETE: CPT

## 2019-08-18 PROCEDURE — 41000019 ZZH PERA-PERFUSION EACH ADDTL 15 MIN: Performed by: TRANSPLANT SURGERY

## 2019-08-18 PROCEDURE — 85730 THROMBOPLASTIN TIME PARTIAL: CPT | Performed by: SURGERY

## 2019-08-18 PROCEDURE — 25800030 ZZH RX IP 258 OP 636: Performed by: TRANSPLANT SURGERY

## 2019-08-18 PROCEDURE — 37000008 ZZH ANESTHESIA TECHNICAL FEE, 1ST 30 MIN: Performed by: TRANSPLANT SURGERY

## 2019-08-18 PROCEDURE — 25000125 ZZHC RX 250: Performed by: TRANSPLANT SURGERY

## 2019-08-18 PROCEDURE — 83605 ASSAY OF LACTIC ACID: CPT | Performed by: INTERNAL MEDICINE

## 2019-08-18 PROCEDURE — 27210447 ZZH PACK CELL SAVER CSP: Performed by: TRANSPLANT SURGERY

## 2019-08-18 PROCEDURE — 0FY00Z0 TRANSPLANTATION OF LIVER, ALLOGENEIC, OPEN APPROACH: ICD-10-PCS | Performed by: TRANSPLANT SURGERY

## 2019-08-18 PROCEDURE — 85610 PROTHROMBIN TIME: CPT | Performed by: SURGERY

## 2019-08-18 PROCEDURE — 85049 AUTOMATED PLATELET COUNT: CPT | Performed by: INTERNAL MEDICINE

## 2019-08-18 PROCEDURE — 85396 CLOTTING ASSAY WHOLE BLOOD: CPT | Performed by: INTERNAL MEDICINE

## 2019-08-18 PROCEDURE — 97535 SELF CARE MNGMENT TRAINING: CPT | Mod: GO

## 2019-08-18 PROCEDURE — 25000132 ZZH RX MED GY IP 250 OP 250 PS 637: Performed by: NURSE ANESTHETIST, CERTIFIED REGISTERED

## 2019-08-18 PROCEDURE — 25000565 ZZH ISOFLURANE, EA 15 MIN: Performed by: TRANSPLANT SURGERY

## 2019-08-18 PROCEDURE — 80053 COMPREHEN METABOLIC PANEL: CPT | Performed by: STUDENT IN AN ORGANIZED HEALTH CARE EDUCATION/TRAINING PROGRAM

## 2019-08-18 PROCEDURE — 99291 CRITICAL CARE FIRST HOUR: CPT | Mod: GC | Performed by: INTERNAL MEDICINE

## 2019-08-18 PROCEDURE — 83605 ASSAY OF LACTIC ACID: CPT | Performed by: SURGERY

## 2019-08-18 PROCEDURE — 82330 ASSAY OF CALCIUM: CPT | Performed by: INTERNAL MEDICINE

## 2019-08-18 PROCEDURE — 87205 SMEAR GRAM STAIN: CPT | Performed by: TRANSPLANT SURGERY

## 2019-08-18 PROCEDURE — 25000125 ZZHC RX 250: Performed by: NURSE ANESTHETIST, CERTIFIED REGISTERED

## 2019-08-18 PROCEDURE — 0DNE0ZZ RELEASE LARGE INTESTINE, OPEN APPROACH: ICD-10-PCS | Performed by: TRANSPLANT SURGERY

## 2019-08-18 PROCEDURE — 87389 HIV-1 AG W/HIV-1&-2 AB AG IA: CPT | Performed by: SURGERY

## 2019-08-18 PROCEDURE — 25000125 ZZHC RX 250: Performed by: GENERAL ACUTE CARE HOSPITAL

## 2019-08-18 PROCEDURE — 40000986 XR CHEST PORT 1 VW

## 2019-08-18 PROCEDURE — 88313 SPECIAL STAINS GROUP 2: CPT | Performed by: TRANSPLANT SURGERY

## 2019-08-18 PROCEDURE — 84295 ASSAY OF SERUM SODIUM: CPT | Performed by: INTERNAL MEDICINE

## 2019-08-18 PROCEDURE — 093K8ZZ CONTROL BLEEDING IN NASAL MUCOSA AND SOFT TISSUE, VIA NATURAL OR ARTIFICIAL OPENING ENDOSCOPIC: ICD-10-PCS | Performed by: OTOLARYNGOLOGY

## 2019-08-18 PROCEDURE — 87015 SPECIMEN INFECT AGNT CONCNTJ: CPT | Performed by: TRANSPLANT SURGERY

## 2019-08-18 PROCEDURE — 36415 COLL VENOUS BLD VENIPUNCTURE: CPT | Performed by: STUDENT IN AN ORGANIZED HEALTH CARE EDUCATION/TRAINING PROGRAM

## 2019-08-18 PROCEDURE — 25000128 H RX IP 250 OP 636: Performed by: SURGERY

## 2019-08-18 PROCEDURE — 25800030 ZZH RX IP 258 OP 636: Performed by: INTERNAL MEDICINE

## 2019-08-18 PROCEDURE — P9012 CRYOPRECIPITATE EACH UNIT: HCPCS | Performed by: STUDENT IN AN ORGANIZED HEALTH CARE EDUCATION/TRAINING PROGRAM

## 2019-08-18 PROCEDURE — 25800030 ZZH RX IP 258 OP 636: Performed by: SURGERY

## 2019-08-18 PROCEDURE — 86704 HEP B CORE ANTIBODY TOTAL: CPT | Performed by: SURGERY

## 2019-08-18 PROCEDURE — 41000018 ZZH PER-PERFUSION 1ST 30 MIN: Performed by: TRANSPLANT SURGERY

## 2019-08-18 PROCEDURE — C1769 GUIDE WIRE: HCPCS | Performed by: TRANSPLANT SURGERY

## 2019-08-18 PROCEDURE — 80076 HEPATIC FUNCTION PANEL: CPT | Performed by: SURGERY

## 2019-08-18 PROCEDURE — 85730 THROMBOPLASTIN TIME PARTIAL: CPT | Performed by: INTERNAL MEDICINE

## 2019-08-18 PROCEDURE — 25000566 ZZH SEVOFLURANE, EA 15 MIN: Performed by: TRANSPLANT SURGERY

## 2019-08-18 PROCEDURE — 3E043XZ INTRODUCTION OF VASOPRESSOR INTO CENTRAL VEIN, PERCUTANEOUS APPROACH: ICD-10-PCS | Performed by: SURGERY

## 2019-08-18 PROCEDURE — 36000070 ZZH SURGERY LEVEL 5 EA 15 ADDTL MIN - UMMC: Performed by: TRANSPLANT SURGERY

## 2019-08-18 PROCEDURE — 86803 HEPATITIS C AB TEST: CPT | Performed by: SURGERY

## 2019-08-18 PROCEDURE — 25800030 ZZH RX IP 258 OP 636: Performed by: NURSE ANESTHETIST, CERTIFIED REGISTERED

## 2019-08-18 PROCEDURE — 87075 CULTR BACTERIA EXCEPT BLOOD: CPT | Performed by: TRANSPLANT SURGERY

## 2019-08-18 PROCEDURE — 25000128 H RX IP 250 OP 636: Performed by: ANESTHESIOLOGY

## 2019-08-18 PROCEDURE — 40000344 ZZHCL STATISTIC THAWING COMPONENT: Performed by: STUDENT IN AN ORGANIZED HEALTH CARE EDUCATION/TRAINING PROGRAM

## 2019-08-18 PROCEDURE — P9037 PLATE PHERES LEUKOREDU IRRAD: HCPCS | Performed by: INTERNAL MEDICINE

## 2019-08-18 PROCEDURE — 40000344 ZZHCL STATISTIC THAWING COMPONENT: Performed by: INTERNAL MEDICINE

## 2019-08-18 PROCEDURE — 37000009 ZZH ANESTHESIA TECHNICAL FEE, EACH ADDTL 15 MIN: Performed by: TRANSPLANT SURGERY

## 2019-08-18 PROCEDURE — 85384 FIBRINOGEN ACTIVITY: CPT | Performed by: INTERNAL MEDICINE

## 2019-08-18 PROCEDURE — 81200005 ZZH ACQUISITION LIVER CADAVER DONOR

## 2019-08-18 PROCEDURE — 25000132 ZZH RX MED GY IP 250 OP 250 PS 637

## 2019-08-18 PROCEDURE — 85027 COMPLETE CBC AUTOMATED: CPT | Performed by: STUDENT IN AN ORGANIZED HEALTH CARE EDUCATION/TRAINING PROGRAM

## 2019-08-18 PROCEDURE — 40000275 ZZH STATISTIC RCP TIME EA 10 MIN

## 2019-08-18 PROCEDURE — 87070 CULTURE OTHR SPECIMN AEROBIC: CPT | Performed by: TRANSPLANT SURGERY

## 2019-08-18 PROCEDURE — 88309 TISSUE EXAM BY PATHOLOGIST: CPT | Performed by: TRANSPLANT SURGERY

## 2019-08-18 PROCEDURE — 82803 BLOOD GASES ANY COMBINATION: CPT | Performed by: INTERNAL MEDICINE

## 2019-08-18 PROCEDURE — P9059 PLASMA, FRZ BETWEEN 8-24HOUR: HCPCS | Performed by: INTERNAL MEDICINE

## 2019-08-18 PROCEDURE — 85610 PROTHROMBIN TIME: CPT | Performed by: STUDENT IN AN ORGANIZED HEALTH CARE EDUCATION/TRAINING PROGRAM

## 2019-08-18 PROCEDURE — C9132 KCENTRA, PER I.U.: HCPCS | Performed by: ANESTHESIOLOGY

## 2019-08-18 PROCEDURE — 90947 DIALYSIS REPEATED EVAL: CPT

## 2019-08-18 PROCEDURE — 84702 CHORIONIC GONADOTROPIN TEST: CPT | Performed by: PEDIATRICS

## 2019-08-18 PROCEDURE — 87081 CULTURE SCREEN ONLY: CPT | Performed by: SURGERY

## 2019-08-18 PROCEDURE — 25800025 ZZH RX 258: Performed by: NURSE ANESTHETIST, CERTIFIED REGISTERED

## 2019-08-18 PROCEDURE — A7035 POS AIRWAY PRESS HEADGEAR: HCPCS

## 2019-08-18 PROCEDURE — 82330 ASSAY OF CALCIUM: CPT | Performed by: SURGERY

## 2019-08-18 PROCEDURE — 27210448 ZZH PACK RI-RAPID INFUSION: Performed by: TRANSPLANT SURGERY

## 2019-08-18 PROCEDURE — 25000131 ZZH RX MED GY IP 250 OP 636 PS 637: Performed by: TRANSPLANT SURGERY

## 2019-08-18 PROCEDURE — 25000125 ZZHC RX 250: Performed by: STUDENT IN AN ORGANIZED HEALTH CARE EDUCATION/TRAINING PROGRAM

## 2019-08-18 PROCEDURE — 84132 ASSAY OF SERUM POTASSIUM: CPT | Performed by: INTERNAL MEDICINE

## 2019-08-18 PROCEDURE — 25000128 H RX IP 250 OP 636: Performed by: INTERNAL MEDICINE

## 2019-08-18 PROCEDURE — 27210794 ZZH OR GENERAL SUPPLY STERILE: Performed by: TRANSPLANT SURGERY

## 2019-08-18 PROCEDURE — 85384 FIBRINOGEN ACTIVITY: CPT | Performed by: STUDENT IN AN ORGANIZED HEALTH CARE EDUCATION/TRAINING PROGRAM

## 2019-08-18 PROCEDURE — 82947 ASSAY GLUCOSE BLOOD QUANT: CPT | Performed by: INTERNAL MEDICINE

## 2019-08-18 PROCEDURE — 25000555 ZZHC RX FACTOR IP 250 OP 636: Performed by: ANESTHESIOLOGY

## 2019-08-18 PROCEDURE — 25000128 H RX IP 250 OP 636: Performed by: TRANSPLANT SURGERY

## 2019-08-18 PROCEDURE — 12000001 ZZH R&B MED SURG/OB UMMC

## 2019-08-18 PROCEDURE — 25000128 H RX IP 250 OP 636: Performed by: NURSE ANESTHETIST, CERTIFIED REGISTERED

## 2019-08-18 PROCEDURE — 86706 HEP B SURFACE ANTIBODY: CPT | Performed by: SURGERY

## 2019-08-18 PROCEDURE — 00000146 ZZHCL STATISTIC GLUCOSE BY METER IP

## 2019-08-18 PROCEDURE — P9041 ALBUMIN (HUMAN),5%, 50ML: HCPCS | Performed by: TRANSPLANT SURGERY

## 2019-08-18 PROCEDURE — 82803 BLOOD GASES ANY COMBINATION: CPT | Performed by: SURGERY

## 2019-08-18 PROCEDURE — 25000128 H RX IP 250 OP 636: Performed by: STUDENT IN AN ORGANIZED HEALTH CARE EDUCATION/TRAINING PROGRAM

## 2019-08-18 PROCEDURE — 40000985 XR ABDOMEN PORT 1 VW

## 2019-08-18 PROCEDURE — P9016 RBC LEUKOCYTES REDUCED: HCPCS | Performed by: SURGERY

## 2019-08-18 PROCEDURE — 85610 PROTHROMBIN TIME: CPT | Performed by: INTERNAL MEDICINE

## 2019-08-18 PROCEDURE — 40000986 XR ABDOMEN PORT 1 VW

## 2019-08-18 RX ORDER — ONDANSETRON 2 MG/ML
INJECTION INTRAMUSCULAR; INTRAVENOUS PRN
Status: DISCONTINUED | OUTPATIENT
Start: 2019-08-18 | End: 2019-08-18

## 2019-08-18 RX ORDER — DEXTROSE MONOHYDRATE 25 G/50ML
INJECTION, SOLUTION INTRAVENOUS PRN
Status: DISCONTINUED | OUTPATIENT
Start: 2019-08-18 | End: 2019-08-18

## 2019-08-18 RX ORDER — MYCOPHENOLATE MOFETIL 250 MG/1
1000 CAPSULE ORAL
Status: DISCONTINUED | OUTPATIENT
Start: 2019-08-18 | End: 2019-08-19

## 2019-08-18 RX ORDER — PROPOFOL 10 MG/ML
INJECTION, EMULSION INTRAVENOUS CONTINUOUS PRN
Status: DISCONTINUED | OUTPATIENT
Start: 2019-08-18 | End: 2019-08-18

## 2019-08-18 RX ORDER — MANNITOL 20 G/100ML
INJECTION, SOLUTION INTRAVENOUS PRN
Status: DISCONTINUED | OUTPATIENT
Start: 2019-08-18 | End: 2019-08-18

## 2019-08-18 RX ORDER — OXYMETAZOLINE HYDROCHLORIDE 0.05 G/100ML
SPRAY NASAL PRN
Status: DISCONTINUED | OUTPATIENT
Start: 2019-08-18 | End: 2019-08-18 | Stop reason: HOSPADM

## 2019-08-18 RX ORDER — MYCOPHENOLATE MOFETIL 200 MG/ML
1000 POWDER, FOR SUSPENSION ORAL
Status: DISCONTINUED | OUTPATIENT
Start: 2019-08-18 | End: 2019-08-19

## 2019-08-18 RX ORDER — DEXAMETHASONE SODIUM PHOSPHATE 4 MG/ML
INJECTION, SOLUTION INTRA-ARTICULAR; INTRALESIONAL; INTRAMUSCULAR; INTRAVENOUS; SOFT TISSUE PRN
Status: DISCONTINUED | OUTPATIENT
Start: 2019-08-18 | End: 2019-08-18

## 2019-08-18 RX ORDER — MAGNESIUM SULFATE HEPTAHYDRATE 500 MG/ML
INJECTION, SOLUTION INTRAMUSCULAR; INTRAVENOUS PRN
Status: DISCONTINUED | OUTPATIENT
Start: 2019-08-18 | End: 2019-08-18

## 2019-08-18 RX ORDER — METHYLPREDNISOLONE SODIUM SUCCINATE 125 MG/2ML
100 INJECTION, POWDER, LYOPHILIZED, FOR SOLUTION INTRAMUSCULAR; INTRAVENOUS ONCE
Status: COMPLETED | OUTPATIENT
Start: 2019-08-20 | End: 2019-08-20

## 2019-08-18 RX ORDER — NOREPINEPHRINE BITARTRATE 0.06 MG/ML
.03-.4 INJECTION, SOLUTION INTRAVENOUS CONTINUOUS
Status: DISCONTINUED | OUTPATIENT
Start: 2019-08-18 | End: 2019-08-18 | Stop reason: CLARIF

## 2019-08-18 RX ORDER — VALGANCICLOVIR 450 MG/1
450 TABLET, FILM COATED ORAL EVERY OTHER DAY
Status: DISCONTINUED | OUTPATIENT
Start: 2019-08-19 | End: 2019-08-23

## 2019-08-18 RX ORDER — NOREPINEPHRINE BITARTRATE 0.06 MG/ML
0.03-0.4 INJECTION, SOLUTION INTRAVENOUS CONTINUOUS
Status: DISCONTINUED | OUTPATIENT
Start: 2019-08-18 | End: 2019-08-21

## 2019-08-18 RX ORDER — METHYLPREDNISOLONE SODIUM SUCCINATE 125 MG/2ML
50 INJECTION, POWDER, LYOPHILIZED, FOR SOLUTION INTRAMUSCULAR; INTRAVENOUS ONCE
Status: COMPLETED | OUTPATIENT
Start: 2019-08-21 | End: 2019-08-21

## 2019-08-18 RX ORDER — LIDOCAINE HYDROCHLORIDE 20 MG/ML
INJECTION, SOLUTION INFILTRATION; PERINEURAL PRN
Status: DISCONTINUED | OUTPATIENT
Start: 2019-08-18 | End: 2019-08-18

## 2019-08-18 RX ORDER — PIPERACILLIN SODIUM, TAZOBACTAM SODIUM 4; .5 G/20ML; G/20ML
4.5 INJECTION, POWDER, LYOPHILIZED, FOR SOLUTION INTRAVENOUS EVERY 6 HOURS
Status: COMPLETED | OUTPATIENT
Start: 2019-08-19 | End: 2019-08-20

## 2019-08-18 RX ORDER — AMINOCAPROIC ACID 250 MG/ML
INJECTION, SOLUTION INTRAVENOUS PRN
Status: DISCONTINUED | OUTPATIENT
Start: 2019-08-18 | End: 2019-08-18

## 2019-08-18 RX ORDER — FENTANYL CITRATE 50 UG/ML
INJECTION, SOLUTION INTRAMUSCULAR; INTRAVENOUS PRN
Status: DISCONTINUED | OUTPATIENT
Start: 2019-08-18 | End: 2019-08-18

## 2019-08-18 RX ORDER — POTASSIUM CHLORIDE 29.8 MG/ML
20 INJECTION INTRAVENOUS EVERY 6 HOURS PRN
Status: DISCONTINUED | OUTPATIENT
Start: 2019-08-18 | End: 2019-08-24

## 2019-08-18 RX ORDER — POTASSIUM CHLORIDE 29.8 MG/ML
20 INJECTION INTRAVENOUS EVERY 6 HOURS PRN
Status: DISCONTINUED | OUTPATIENT
Start: 2019-08-18 | End: 2019-08-18

## 2019-08-18 RX ORDER — ALBUMIN, HUMAN INJ 5% 5 %
SOLUTION INTRAVENOUS PRN
Status: DISCONTINUED | OUTPATIENT
Start: 2019-08-18 | End: 2019-08-18 | Stop reason: HOSPADM

## 2019-08-18 RX ORDER — ALBUTEROL SULFATE 90 UG/1
AEROSOL, METERED RESPIRATORY (INHALATION) PRN
Status: DISCONTINUED | OUTPATIENT
Start: 2019-08-18 | End: 2019-08-18

## 2019-08-18 RX ORDER — PROPOFOL 10 MG/ML
INJECTION, EMULSION INTRAVENOUS PRN
Status: DISCONTINUED | OUTPATIENT
Start: 2019-08-18 | End: 2019-08-18

## 2019-08-18 RX ORDER — PREDNISONE 10 MG/1
10 TABLET ORAL ONCE
Status: COMPLETED | OUTPATIENT
Start: 2019-08-23 | End: 2019-08-23

## 2019-08-18 RX ORDER — PROPOFOL 10 MG/ML
5-75 INJECTION, EMULSION INTRAVENOUS CONTINUOUS
Status: DISCONTINUED | OUTPATIENT
Start: 2019-08-18 | End: 2019-08-21

## 2019-08-18 RX ORDER — DEXTROSE MONOHYDRATE 25 G/50ML
25-50 INJECTION, SOLUTION INTRAVENOUS
Status: DISCONTINUED | OUTPATIENT
Start: 2019-08-18 | End: 2019-08-21

## 2019-08-18 RX ORDER — PAPAVERINE HYDROCHLORIDE 30 MG/ML
INJECTION INTRAMUSCULAR; INTRAVENOUS PRN
Status: DISCONTINUED | OUTPATIENT
Start: 2019-08-18 | End: 2019-08-18 | Stop reason: HOSPADM

## 2019-08-18 RX ORDER — VALGANCICLOVIR HYDROCHLORIDE 50 MG/ML
450 POWDER, FOR SOLUTION ORAL EVERY OTHER DAY
Status: DISCONTINUED | OUTPATIENT
Start: 2019-08-19 | End: 2019-08-23

## 2019-08-18 RX ORDER — SODIUM CHLORIDE, SODIUM GLUCONATE, SODIUM ACETATE, POTASSIUM CHLORIDE AND MAGNESIUM CHLORIDE 526; 502; 368; 37; 30 MG/100ML; MG/100ML; MG/100ML; MG/100ML; MG/100ML
INJECTION, SOLUTION INTRAVENOUS CONTINUOUS PRN
Status: DISCONTINUED | OUTPATIENT
Start: 2019-08-18 | End: 2019-08-18

## 2019-08-18 RX ORDER — NICOTINE POLACRILEX 4 MG
15-30 LOZENGE BUCCAL
Status: DISCONTINUED | OUTPATIENT
Start: 2019-08-18 | End: 2019-08-21

## 2019-08-18 RX ORDER — CALCIUM CHLORIDE 100 MG/ML
INJECTION INTRAVENOUS; INTRAVENTRICULAR PRN
Status: DISCONTINUED | OUTPATIENT
Start: 2019-08-18 | End: 2019-08-18

## 2019-08-18 RX ADMIN — LIDOCAINE HYDROCHLORIDE 100 MG: 20 INJECTION, SOLUTION INFILTRATION; PERINEURAL at 10:43

## 2019-08-18 RX ADMIN — MIDAZOLAM 2 MG: 1 INJECTION INTRAMUSCULAR; INTRAVENOUS at 10:43

## 2019-08-18 RX ADMIN — CALCIUM CHLORIDE, MAGNESIUM CHLORIDE, DEXTROSE MONOHYDRATE, LACTIC ACID, SODIUM CHLORIDE, SODIUM BICARBONATE AND POTASSIUM CHLORIDE 12.5 ML/KG/HR: 5.15; 2.03; 22; 5.4; 6.46; 3.09; .157 INJECTION INTRAVENOUS at 11:29

## 2019-08-18 RX ADMIN — DEXTROSE MONOHYDRATE 50 G: 25 INJECTION, SOLUTION INTRAVENOUS at 12:22

## 2019-08-18 RX ADMIN — PROPOFOL 40 MCG/KG/MIN: 10 INJECTION, EMULSION INTRAVENOUS at 21:33

## 2019-08-18 RX ADMIN — ROCURONIUM BROMIDE 50 MG: 10 INJECTION INTRAVENOUS at 13:04

## 2019-08-18 RX ADMIN — CALCIUM CHLORIDE 1 G: 100 INJECTION, SOLUTION INTRAVENOUS at 13:31

## 2019-08-18 RX ADMIN — PIPERACILLIN SODIUM AND TAZOBACTAM SODIUM 3.38 G: 3; .375 INJECTION, POWDER, LYOPHILIZED, FOR SOLUTION INTRAVENOUS at 12:37

## 2019-08-18 RX ADMIN — Medication 0.08 MCG/KG/MIN: at 21:35

## 2019-08-18 RX ADMIN — AMINOCAPROIC ACID 1 G: 250 INJECTION, SOLUTION INTRAVENOUS at 19:07

## 2019-08-18 RX ADMIN — EPINEPHRINE 10 MCG: 1 INJECTION PARENTERAL at 15:12

## 2019-08-18 RX ADMIN — PROPOFOL 40 MCG/KG/MIN: 10 INJECTION, EMULSION INTRAVENOUS at 20:22

## 2019-08-18 RX ADMIN — ROCURONIUM BROMIDE 50 MG: 10 INJECTION INTRAVENOUS at 17:02

## 2019-08-18 RX ADMIN — PIPERACILLIN SODIUM AND TAZOBACTAM SODIUM 2.25 G: .25; 2 INJECTION, POWDER, LYOPHILIZED, FOR SOLUTION INTRAVENOUS at 14:38

## 2019-08-18 RX ADMIN — AMINOCAPROIC ACID 1 G: 250 INJECTION, SOLUTION INTRAVENOUS at 16:15

## 2019-08-18 RX ADMIN — SODIUM CHLORIDE, SODIUM GLUCONATE, SODIUM ACETATE, POTASSIUM CHLORIDE AND MAGNESIUM CHLORIDE: 526; 502; 368; 37; 30 INJECTION, SOLUTION INTRAVENOUS at 10:36

## 2019-08-18 RX ADMIN — EPINEPHRINE 10 MCG: 1 INJECTION PARENTERAL at 15:11

## 2019-08-18 RX ADMIN — ONDANSETRON 4 MG: 2 INJECTION INTRAMUSCULAR; INTRAVENOUS at 12:09

## 2019-08-18 RX ADMIN — AMINOCAPROIC ACID 1 G: 250 INJECTION, SOLUTION INTRAVENOUS at 14:15

## 2019-08-18 RX ADMIN — EPINEPHRINE 10 MCG: 1 INJECTION PARENTERAL at 15:07

## 2019-08-18 RX ADMIN — CALCIUM CHLORIDE 2 G: 100 INJECTION, SOLUTION INTRAVENOUS at 15:09

## 2019-08-18 RX ADMIN — FENTANYL CITRATE 100 MCG: 50 INJECTION, SOLUTION INTRAMUSCULAR; INTRAVENOUS at 19:40

## 2019-08-18 RX ADMIN — NOREPINEPHRINE BITARTRATE 0.03 MCG/KG/MIN: 1 INJECTION INTRAVENOUS at 13:14

## 2019-08-18 RX ADMIN — ROCURONIUM BROMIDE 50 MG: 10 INJECTION INTRAVENOUS at 18:21

## 2019-08-18 RX ADMIN — HUMAN INSULIN 0.5 UNITS/HR: 100 INJECTION, SOLUTION SUBCUTANEOUS at 22:06

## 2019-08-18 RX ADMIN — CALCIUM CHLORIDE 1 G: 100 INJECTION, SOLUTION INTRAVENOUS at 12:07

## 2019-08-18 RX ADMIN — NOREPINEPHRINE BITARTRATE 6.4 MCG: 1 INJECTION INTRAVENOUS at 15:59

## 2019-08-18 RX ADMIN — ROCURONIUM BROMIDE 100 MG: 10 INJECTION INTRAVENOUS at 10:43

## 2019-08-18 RX ADMIN — MYCOPHENOLATE MOFETIL 1000 MG: 200 POWDER, FOR SUSPENSION ORAL at 22:23

## 2019-08-18 RX ADMIN — SODIUM CHLORIDE 500 MG: 9 INJECTION, SOLUTION INTRAVENOUS at 15:08

## 2019-08-18 RX ADMIN — CALCIUM CHLORIDE 1 G: 100 INJECTION, SOLUTION INTRAVENOUS at 17:16

## 2019-08-18 RX ADMIN — FENTANYL CITRATE 250 MCG: 50 INJECTION, SOLUTION INTRAMUSCULAR; INTRAVENOUS at 10:43

## 2019-08-18 RX ADMIN — NOREPINEPHRINE BITARTRATE 6.4 MCG: 1 INJECTION INTRAVENOUS at 17:58

## 2019-08-18 RX ADMIN — CALCIUM CHLORIDE 1 G: 100 INJECTION, SOLUTION INTRAVENOUS at 13:39

## 2019-08-18 RX ADMIN — PHENYLEPHRINE HYDROCHLORIDE 100 MCG: 10 INJECTION INTRAVENOUS at 15:57

## 2019-08-18 RX ADMIN — CALCIUM CHLORIDE, MAGNESIUM CHLORIDE, DEXTROSE MONOHYDRATE, LACTIC ACID, SODIUM CHLORIDE, SODIUM BICARBONATE AND POTASSIUM CHLORIDE: 5.15; 2.03; 22; 5.4; 6.46; 3.09; .157 INJECTION INTRAVENOUS at 11:29

## 2019-08-18 RX ADMIN — AMINOCAPROIC ACID 1 G: 250 INJECTION, SOLUTION INTRAVENOUS at 17:15

## 2019-08-18 RX ADMIN — HUMAN INSULIN 2 UNITS/HR: 100 INJECTION, SOLUTION SUBCUTANEOUS at 16:22

## 2019-08-18 RX ADMIN — CALCIUM CHLORIDE 2 G: 100 INJECTION, SOLUTION INTRAVENOUS at 14:26

## 2019-08-18 RX ADMIN — MANNITOL 50 G: 20 INJECTION, SOLUTION INTRAVENOUS at 14:46

## 2019-08-18 RX ADMIN — ROCURONIUM BROMIDE 50 MG: 10 INJECTION INTRAVENOUS at 19:38

## 2019-08-18 RX ADMIN — NOREPINEPHRINE BITARTRATE 6.4 MCG: 1 INJECTION INTRAVENOUS at 17:59

## 2019-08-18 RX ADMIN — VASOPRESSIN 1 UNITS: 20 INJECTION INTRAVENOUS at 16:14

## 2019-08-18 RX ADMIN — EPINEPHRINE 10 MCG: 1 INJECTION PARENTERAL at 15:09

## 2019-08-18 RX ADMIN — PIPERACILLIN SODIUM AND TAZOBACTAM SODIUM 2.25 G: .25; 2 INJECTION, POWDER, LYOPHILIZED, FOR SOLUTION INTRAVENOUS at 17:35

## 2019-08-18 RX ADMIN — CALCIUM CHLORIDE 1 G: 100 INJECTION, SOLUTION INTRAVENOUS at 17:08

## 2019-08-18 RX ADMIN — FENTANYL CITRATE 50 MCG/HR: 50 INJECTION INTRAVENOUS at 21:46

## 2019-08-18 RX ADMIN — PROPOFOL 70 MG: 10 INJECTION, EMULSION INTRAVENOUS at 10:43

## 2019-08-18 RX ADMIN — ROCURONIUM BROMIDE 50 MG: 10 INJECTION INTRAVENOUS at 14:33

## 2019-08-18 RX ADMIN — DEXTROSE AND SODIUM CHLORIDE: 5; 450 INJECTION, SOLUTION INTRAVENOUS at 21:41

## 2019-08-18 RX ADMIN — PIPERACILLIN SODIUM AND TAZOBACTAM SODIUM 2.25 G: .25; 2 INJECTION, POWDER, LYOPHILIZED, FOR SOLUTION INTRAVENOUS at 19:35

## 2019-08-18 RX ADMIN — CALCIUM CHLORIDE 1 G: 100 INJECTION, SOLUTION INTRAVENOUS at 13:35

## 2019-08-18 RX ADMIN — VASOPRESSIN 1 UNITS: 20 INJECTION INTRAVENOUS at 17:40

## 2019-08-18 RX ADMIN — VASOPRESSIN 2 UNITS: 20 INJECTION INTRAVENOUS at 16:00

## 2019-08-18 RX ADMIN — DESMOPRESSIN ACETATE 31.72 MCG: 4 SOLUTION INTRAVENOUS at 13:02

## 2019-08-18 RX ADMIN — VASOPRESSIN 4 UNITS/HR: 20 INJECTION INTRAVENOUS at 14:01

## 2019-08-18 RX ADMIN — EPINEPHRINE 0.05 MCG/KG/MIN: 1 INJECTION PARENTERAL at 14:54

## 2019-08-18 RX ADMIN — AMINOCAPROIC ACID 1 G: 250 INJECTION, SOLUTION INTRAVENOUS at 15:15

## 2019-08-18 RX ADMIN — CALCIUM CHLORIDE 1 G: 100 INJECTION, SOLUTION INTRAVENOUS at 11:52

## 2019-08-18 RX ADMIN — PROTHROMBIN, COAGULATION FACTOR VII HUMAN, COAGULATION FACTOR IX HUMAN, COAGULATION FACTOR X HUMAN, PROTEIN C, PROTEIN S HUMAN, AND WATER 1068 UNITS: KIT at 14:23

## 2019-08-18 RX ADMIN — ROCURONIUM BROMIDE 50 MG: 10 INJECTION INTRAVENOUS at 13:45

## 2019-08-18 RX ADMIN — NOREPINEPHRINE BITARTRATE 6.4 MCG: 1 INJECTION INTRAVENOUS at 15:56

## 2019-08-18 RX ADMIN — ALBUTEROL SULFATE 10 PUFF: 90 AEROSOL, METERED RESPIRATORY (INHALATION) at 15:02

## 2019-08-18 RX ADMIN — ACETAMINOPHEN 325 MG: 325 TABLET, FILM COATED ORAL at 01:43

## 2019-08-18 RX ADMIN — VANCOMYCIN HYDROCHLORIDE 1.75 G: 1 INJECTION, POWDER, LYOPHILIZED, FOR SOLUTION INTRAVENOUS at 11:45

## 2019-08-18 RX ADMIN — MICAFUNGIN SODIUM 100 MG: 10 INJECTION, POWDER, LYOPHILIZED, FOR SOLUTION INTRAVENOUS at 12:37

## 2019-08-18 RX ADMIN — CALCIUM CHLORIDE 2 G: 100 INJECTION, SOLUTION INTRAVENOUS at 16:23

## 2019-08-18 RX ADMIN — MAGNESIUM SULFATE HEPTAHYDRATE 1 G: 500 INJECTION, SOLUTION INTRAMUSCULAR; INTRAVENOUS at 17:49

## 2019-08-18 RX ADMIN — DEXAMETHASONE SODIUM PHOSPHATE 4 MG: 4 INJECTION, SOLUTION INTRA-ARTICULAR; INTRALESIONAL; INTRAMUSCULAR; INTRAVENOUS; SOFT TISSUE at 12:09

## 2019-08-18 ASSESSMENT — ACTIVITIES OF DAILY LIVING (ADL)
ADLS_ACUITY_SCORE: 22

## 2019-08-18 NOTE — TELEPHONE ENCOUNTER
TRANSPLANT OR REPORT    Organ: Whole liver  Laterality (if known): N/A  Organ Location: TBD    UNOS ID: [id number]  Donor OR Time: 8/18/19@0730  Expected/Actual Cross Clamp Time: 0830  Expected Organ Arrival Time: 1130  Blood bank: [facility] notified @0013     Surgeon: Ann-Marie  Time in OR: 1100 set Up with OR control deskRanjana 8/8/2019@0000  Time in 3C (N/A for LI): N/A    Recipient Details  Admission ETA: Already admitted  Unit: 5A  Isolation: none  Latex Allergy: none  : N/A  Diagnosis: ESLD    Liver Transplants  Bypass: N/A  Hemodialysis: Yes, unit 4A called Ranjana aware of case 8/17/2019 1930  ~ Medicine Renal Staff:PARESH OWENS - STAFF paged 8/17/19 at 8480, MD requested we enter consult order as well.   ~ CRRT Resource Nurse: None on staff Aminta malloy  on 4A updated@0005 regarding set OR time 1100.   (Telephone Number for CRRT [phone])    Kidney/Panc Transplants  XM Status (Need to wait for XM?): N/A    Liver or KP/PA Recipients:  Can Vessels be Banked: Yes      Transplant Coordinator Contact Info: Mee Eaton [phone]      Vessel Bank Information  Transplant hospitals must not store a donor s extra vessels if the donor has tested positive for any of the following:   - HIV by antibody, antigen, or nucleic acid test (JUAN)   - Hepatitis B surface antigen (HBsAg)   - Hepatitis B (HBV) by JUAN   - Hepatitis C (HCV) by antibody or JUAN     Extra vessels from donors that do not test positive for HIV, HBV, or HCV as above may be stored    Mee Eaton RN on 8/18/2019 at 12:20 AM

## 2019-08-18 NOTE — PLAN OF CARE
OT/Edema 5A: GCB removed in preparation for surgery later this date. Educated pt on importance of continuing muscle pumps to mobilize fluid following transplant. Educated nursing on GCB wear, instructed to leave wraps off until OT/edema returns. Pt's personal wraps sent to be washed.

## 2019-08-18 NOTE — PROGRESS NOTES
CRRT INITIATION NOTE    Consent for CRRT Completed:  YES  Patient s Vascular Access: Catheter              Placement Confirmed: YES  Manufacture:  "[employer]  Model:  Ashkan  Length/Australian Size:  20cm/12F  Flush Volume:  1.4mL    DATA:  Procedure:  CVVHDF  Start Time:  1208  Machine#:  4  Filter:  M150  Blood Flow:  200  ML/min  Pre-Replacement Solution:  Prismasol 3.5/2  Post-Replacement Solution:   Prismasol 3.5/2  Dialysate Solution:   Prismasol 3.5/2  Pre-Replacement Solution Rate:  1300 mL/hr  Post-Replacement Solution Rate:  200 mL/hr  Dialysate Flow Rate:  1300 mL/hr   Patient Removal Rate: 0 mL/hr  Anticoagulation Type and Rate:  0    ASSESSMENT:  How Patient Tolerated Initiation:   Vital Signs:  /50   Pulse 78   Temp 97.9  F (36.6  C) (Oral)   Resp 16   Wt 105.7 kg (233 lb)   SpO2 93%   BMI 42.62 kg/m    Initial Pressures:  Access:  -32  Filter:  174  Return:  135  TMP:  58  Change in Filter Pressure:  9      INTERVENTIONS:  CRRT initiated in OR through Left internal jugular marhukar placed by anesthesia team. Consent for procedure in chart. Filter primed with NS and therapy started without complication. Fluid removal set to zero per anesthesia team.     PLAN:  Continue to treat as ordered throughout transplant. Plan to continue treatment in ICU after OR case complete

## 2019-08-18 NOTE — ANESTHESIA PREPROCEDURE EVALUATION
Anesthesia Pre-Procedure Evaluation    Patient: Nicci Pemberton   MRN:     [id number] Gender:   female   Age:    59 year old :      1960        Preoperative Diagnosis: End Stage Liver Disease   Procedure(s):  TRANSPLANT, LIVER, RECIPIENT,  DONOR  BACKBENCH PREPARATION, LIVER     Past Medical History:   Diagnosis Date     Anemia      Cellulitis      Cirrhosis of liver (H) 2018     Heterozygous alpha 1-antitrypsin deficiency (H)      High hepatic iron concentration determined by biopsy of liver      Liver cirrhosis secondary to ANGULO (H)      Lymphedema      Osteoarthritis      SBP (spontaneous bacterial peritonitis) (H) 2019 in [facility]      Past Surgical History:   Procedure Laterality Date     COLONOSCOPY N/A 2018    Procedure: COLONOSCOPY;  colonoscopy;  Surgeon: Hugo Patel MD;  Location: [facility] OR          Anesthesia Evaluation     .             ROS/MED HX    ENT/Pulmonary: Comment: Alpha-1 antitrypsin defficiency    (+), . Other pulmonary disease ( ) Small bilateral pleural effusions on CXR.    Neurologic:       Cardiovascular:     (+) hypertension----. : . . . :. .       METS/Exercise Tolerance:     Hematologic: Comments: Iron overload last hgb 6.9    (+) Anemia, Other Hematologic Disorder-Thrombocytopenia, last platelets 39      Musculoskeletal:         GI/Hepatic: Comment: Umbilical hernia    (+) liver disease (ESLD 2/2 ANGULO c/b recent SBP on  (on lactulose and rifaximin),was admitted on  for acute decompensated cirrhosis),       Renal/Genitourinary:     (+) Pt does not require dialysis, Pt has no history of transplant, Other Renal/ Genitourinary,    Chronic renal disease: Holding Bumex in setting of GAMAL.   Endo:     (+) thyroid problem hypothyroidism, .      Psychiatric:         Infectious Disease:         Malignancy:         Other: Comment: admitted to [facility] on  for SBP and infectious enterocolitis, treated with Zosyn and Flagil, and discharged to  [facility] TCU on 8/8 with cephalexin. The patient was doing well at the TCU for the first few days, but then began to have a large number of bowel movements per day (up to 16 per patient and her ). She described the bowel movements as watery and green, without any discoloration or visible blood. This was accompanied by suprapubic abdominal pain around the time of defecation. Her lactulose frequency was changed from x3 to x2 per day at this time, decreasing her BM frequency to 5-6 times per day. The patient states that she became very weak and tired over the last 3 days, feeling like she could sleep all day and had a decrease in appetite. She also notes intermittent substernal, non-radiating chest pain for the last 2 weeks, describing the pain as a pressure that feels like her heart is being pulled down with gravity when she stands up and walks around. She reports this pain as 5/10 in severity, relieved with rest (dobutamine stress test 6/19/2019 negative for ischemia). The patient presents today from her TCU due to a SBP in the 80's, elevated BUN and Cr, and progressive fatigue                        PHYSICAL EXAM:   Mental Status/Neuro: A/A/O   Airway: Facies: Feasible   Respiratory:   Resp. Effort: Normal      CV:   Rate: Age appropriate   Comments:                      LABS:  CBC:   Lab Results   Component Value Date    WBC 4.8 08/17/2019    WBC 5.2 08/17/2019    HGB 6.9 (LL) 08/17/2019    HGB 7.6 (L) 08/17/2019    HCT 21.3 (L) 08/17/2019    HCT 23.0 (L) 08/17/2019    PLT 32 (LL) 08/17/2019    PLT 39 (LL) 08/17/2019     BMP:   Lab Results   Component Value Date     (L) 08/17/2019     (L) 08/17/2019    POTASSIUM 4.5 08/17/2019    POTASSIUM 4.8 08/17/2019    CHLORIDE 97 08/17/2019    CHLORIDE 97 08/17/2019    CO2 23 08/17/2019    CO2 25 08/17/2019    BUN 45 (H) 08/17/2019    BUN 46 (H) 08/17/2019    CR 2.04 (H) 08/17/2019    CR 1.96 (H) 08/17/2019    GLC 91 08/17/2019    GLC 64 (L)  "08/17/2019     COAGS:   Lab Results   Component Value Date    PTT 66 (H) 08/17/2019    INR 3.64 (H) 08/17/2019    FIBR 85 (LL) 08/17/2019     POC:   Lab Results   Component Value Date    BGM 75 08/17/2019    HCGS Negative 06/18/2018     OTHER:   Lab Results   Component Value Date    LACT 1.9 08/16/2019    JACOB 8.4 (L) 08/17/2019    PHOS 3.8 08/17/2019    MAG 2.5 (H) 08/17/2019    ALBUMIN 3.4 08/17/2019    PROTTOTAL 5.2 (L) 08/17/2019    ALT 34 08/17/2019    AST 75 (H) 08/17/2019    ALKPHOS 87 08/17/2019    BILITOTAL 26.9 (HH) 08/17/2019    LIPASE 364 08/16/2019    AMYLASE 45 08/17/2019    DOROTHEA Canceled, Test credited 08/16/2019    TSH 2.95 06/18/2018    CRP 8.9 (H) 06/18/2018    SED 17 06/18/2018        Preop Vitals    BP Readings from Last 3 Encounters:   08/18/19 103/46   07/11/19 96/68   04/18/19 107/68    Pulse Readings from Last 3 Encounters:   08/18/19 80   07/11/19 73   04/18/19 79      Resp Readings from Last 3 Encounters:   08/18/19 18   08/21/18 16   06/22/18 14    SpO2 Readings from Last 3 Encounters:   08/18/19 93%   07/11/19 100%   04/18/19 100%      Temp Readings from Last 1 Encounters:   08/18/19 36.7  C (98.1  F) (Oral)    Ht Readings from Last 1 Encounters:   04/18/19 1.575 m (5' 2\")      Wt Readings from Last 1 Encounters:   08/16/19 105.7 kg (233 lb)    Estimated body mass index is 42.62 kg/m  as calculated from the following:    Height as of 4/18/19: 1.575 m (5' 2\").    Weight as of this encounter: 105.7 kg (233 lb).     LDA:  Peripheral IV 08/17/19 Left;Anterior Upper forearm (Active)   Site Assessment WDL 8/18/2019  2:00 AM   Line Status Infusing 8/18/2019  2:00 AM   Phlebitis Scale 0-->no symptoms 8/18/2019  2:00 AM   Infiltration Scale 0 8/18/2019  2:00 AM   Infiltration Site Treatment Method  None 8/18/2019  2:00 AM   Number of days: 1        Assessment:   ASA SCORE: 4 emergent   H&P: History and physical reviewed and following examination; no interval change.    NPO Status: ELEVATED " Aspiration Risk/Unknown     Plan:   Anes. Type:  General   Pre-Medication: None   Induction:  IV (RSI)   Airway: ETT; Oral   Access/Monitoring: PIV; 2nd PIV; A-Line; CVL; MAC-Line; PAC   Maintenance: Balanced     Blood products: Blood in Room; PRBC; Cell Saver; FFP; PLT; Cryo     Drips/Meds: Phenylephrine; Norepi; Epinephrine; Ketamine; Sufentanil; Vasopressin     Advanced Monitoring: BIS; NIRS (cerebral); STEVEN Adult     Postop Plan:   Postop Pain: Opioids  Postop Sedation/Airway: SECURED AIRWAY likely; Sedation likely       Postop Sedation: Propofol Infusion  Disposition: Inpatient/Admit; ICU     PONV Management:  NO PONV Prophylaxis Required     CONSENT: Direct conversation   Plan and risks discussed with: Patient   Blood Products: Consented (ALL Blood Products)                      MELD-Na score: 37 at 8/18/2019  9:07 AM  MELD score: 36 at 8/18/2019  9:07 AM  Calculated from:  Serum Creatinine: 2.04 mg/dL at 8/17/2019 11:41 PM  Serum Sodium: 129 mmol/L at 8/17/2019 11:41 PM  Total Bilirubin: 26.9 mg/dL at 8/17/2019 11:41 PM  INR(ratio): 2.52 at 8/18/2019  9:07 AM  Age: 59 years    59yF. Hx ESLD 2/2 alpha-1 AT def, ANGULO presents for DBD OLT. Comorbid hyponatremia, recent SBP, hepatic encephalopathy, obesity, severe GAMAL, anemia (7.3), thrombocytopenia (29). We will plan for GETA with rapid sequence induction. PIVx2 with NO fluid warmer (for PLT and cryo). CVCx2, PAC, arterial line, STEVEN, resuscitation with PlasmaLyte via Nicholas with dual patient line, under+upper+lower body Melvina Huggers, cerebral oximetry, BIS (40-60), hourly TEG and ABG, intraop CRRT. Insulin gtt for goal K < 3.5 at reperfusion, calcium gtt for goal iCa > 5.0 at reperfusion. Hyperventilation for goal PaCO2 25-30 until after reperfusion (then normocapnea). Blood in room (5+5+2 at all times). Sufenta and ketamine gtt's for analgesia. Rocuronium for NMB with likely Sugammadex reversal upon transfer to ICU.   ___________________   Prieto Chapin MD           Pager: [phone]

## 2019-08-18 NOTE — PLAN OF CARE
Time: 5771-2172  Reason for admission/Dx:   Generalized muscle weakness [M62.81]  Acute kidney injury (H) [N17.9]  Liver transplant   [Vitals]: /50   Pulse 78   Temp 97.9  F (36.6  C) (Oral)   Resp 16   Wt 105.7 kg (233 lb)   SpO2 93%   BMI 42.62 kg/m    [Pain/PRN/s]: WDL, denies pain  [Respiratory]: WDL ex diminished in bases  [Cardiac]: WDL  [Neuros]: WDL  [GI]:Orders Placed This Encounter      NPO per Anesthesia Guidelines for Procedure/Surgery Except for: Meds  No meds given per MD telephone order  []: WDL  [Skin/Wounds]: WDL ex bruising and edema  [Lines]: Peripheral IV 08/17/19 Left;Anterior Upper forearm (Active)   Site Assessment WDL 8/18/2019  9:00 AM   Line Status Infusing 8/18/2019  9:00 AM   Phlebitis Scale 0-->no symptoms 8/18/2019  9:00 AM   Infiltration Scale 0 8/18/2019  9:00 AM   Infiltration Site Treatment Method  None 8/18/2019  9:00 AM   Number of days: 1     [Infusions]: N/A  [Activity]: Up with 1 assist, traveled to OR in wheelchair  [Labs/Lactic]:   Lab Results   Component Value Date    HGB 7.4 08/18/2019    CR 2.04 08/17/2019    PLT 29 08/18/2019    HCT 23.0 08/18/2019    WBC 5.0 08/18/2019          Lactic Acid (mmol/L)   Date Value   08/16/2019 1.9       [Electrolytes]:   Potassium (mmol/L)   Date Value   08/18/2019 4.6     Magnesium (mg/dL)   Date Value   08/17/2019 2.5 (H)     Phosphorus (mg/dL)   Date Value   08/17/2019 3.8     Sodium   Date Value Ref Range Status   08/18/2019 129 (L) 133 - 144 mmol/L Final         Plan:  Pre-op checklist completed, cryo infusion completed this AM, Chlorhexidine wipes used on abdomen, consent forms verified and pt headed for liver transplant @1030.  Chart and IV medications sent down w pt.

## 2019-08-18 NOTE — PROGRESS NOTES
[facility]    Hepatology Follow-up  08/18/2019    CC: Weakness, GAMAL    Dx: Decompensated cirrhosis   GAMAL   Hepatic Encephalopathy   Recent history of SBP   Listed for transplant    24 hour events:  Has been anemic - likely dilutional in the setting of albumin challenge  No evidence of melena or hematochezia  Renal function is unchanged    Plan for liver transplant today    Subjective:  Patient anxious about her transplant today.  Patient denies fevers, sweats or chills.    Medications  Current Facility-Administered Medications   Medication Dose Route Frequency     basiliximab (SIMULECT) infusion  20 mg Intravenous Once     famotidine  20 mg Oral BID     levothyroxine  125 mcg Oral Daily     methylPREDNISolone  500 mg Intravenous Once     micafungin  100 mg Intravenous Once     piperacillin-tazobactam  3.375 g Intravenous Once     rifaximin  550 mg Oral BID     sodium chloride (PF)  3 mL Intracatheter Q8H     sodium chloride (PF)  3 mL Intracatheter Q8H     vitamin D3  1,000 Units Oral BID       Review of systems  A 10-point review of systems was negative.    Examination  BP 97/46   Pulse 71   Temp 97.9  F (36.6  C) (Oral)   Resp 18   Wt 105.7 kg (233 lb)   SpO2 92%   BMI 42.62 kg/m      Intake/Output Summary (Last 24 hours) at 8/17/2019 0705  Last data filed at 8/17/2019 0004  Gross per 24 hour   Intake 640 ml   Output 675 ml   Net -35 ml       Gen- well, NAD, A+Ox3, normal color  CVS- RRR  RS- CTA  Abd- soft, distended with ascites, non tender  Extr- LE edema present  Neuro- alert and oriented X 3, no asterixis present  Skin- no rash  Psych- normal mood  Lym- no LAD    Laboratory  Reviewed in Epic.      Radiology  CXR reviewed - small bilateral pleural effusions  Abd US with doppler reviewed - portal venous system is patent, retrograde flow through the portal system which is new compared to previous ultrasound    Assessment  59 year old F with history of obesity, lymphedema, and  decompensated cirrhosis secondary to NAFLD, iron overload, and A1AT MZ phenotype, c/b ascites, HE. She was admitted for fatigue, GAMAL and worsening bilirubin.  GAMAL likely prerenal given improvement with albumin challenge. Ddx also include HRS.    1. GAMAL - elevated to 2.3 on admission from baseline of 0.6. Improved somewhat to 1.96, stable today at 2  2. Decompensated Cirrhosis - MELD on admission was 33.    - HE: Has prior history of this. On lactulose, had significant stool output   - EV: none in recent EGD in 2018, may need to repeat   - Ascites: takes diuretics at home (on hold now due to GAMAL)   - SBP hx: Recent history 7/31. S/p treatment. Labs from 8/16 negative. Will need SBP ppx.   - Coagulopathy: INR today is : 3.46   - US: Negative for HCC   - Currently listed for transplant: Tentatively getting a transplant today.    MELD-Na score: 40 at 8/17/2019 11:41 PM  MELD score: 40 at 8/17/2019 11:41 PM  Calculated from:  Serum Creatinine: 2.04 mg/dL at 8/17/2019 11:41 PM  Serum Sodium: 129 mmol/L at 8/17/2019 11:41 PM  Total Bilirubin: 26.9 mg/dL at 8/17/2019 11:41 PM  INR(ratio): 3.64 at 8/17/2019 11:41 PM  Age: 59 years      Recommendations  - If patient receives a transplant today, further cares per transplant surgery team  - GI will be available post transplant if any questions arise.    Patient seen and discussed with Dr Manoj Mccurdy    GI Fellow  [phone]    Attestation:  This patient has been seen and evaluated by me, Nerisas Aranda.  Discussed with the house staff team or resident(s) and agree with the findings and plan in this note.

## 2019-08-18 NOTE — ANESTHESIA PROCEDURE NOTES
PA Catheter Insertion Note  Anesthesiologist: Elpidio Chapin MD  Resident:  Parul Aguilar MD   PA Catheter placed by resident/CRNA in the presence of a teaching physician.  Introducer: Introducer placed as part of procedure (SEE separate note)   Skin prep:  Chloraprep Cap, Full body drape, hand hygiene, Mask, Sterile gloves and Sterile gown    PA Catheter type:  CCO    Distance catheter advanced:  55 cm.    Appropriate RA, RV, PA  waveforms?: Yes    Dressing:  Biopatch and Tegaderm    Complications:  None apparent

## 2019-08-18 NOTE — PLAN OF CARE
Pt was admitted for end stage liver failure. Pt was hypotensive with a BP of 80/39. Nursing notified Marcie luiscover. Pt had one BM. Pt plans to receive a liver tomorrow. Nursing started pre op transplant checklist. Nursing will continue to monitor.

## 2019-08-18 NOTE — ANESTHESIA PROCEDURE NOTES
Central Line Procedure Note  Staff:     Anesthesiologist:  Elpidio Chapin MD    Resident/CRNA:  Parul Aguilar MD    Central line placed by Resident/CRNA in the presence of a teaching physician    Location: In OR after induction  Procedure Start/Stop Times:     patient identified, IV checked, risks and benefits discussed, informed consent, monitors and equipment checked, pre-op evaluation and at physician/surgeon's request      Correct Patient: Yes      Correct Position: Yes      Correct Site: Yes      Correct Procedure: Yes      Correct Laterality:  N/A    Site Marked:  N/A  Line Placement:     Procedure:  Central line    Insertion laterality:  Right    Insertion site:  Internal Jugular    Position:  Trendelenburg      Maximal Sterile Barriers: All elements of maximal sterile barrier technique followed      (Maximal sterile barriers include:   Sterile gown, Sterile Gloves, Mask, Cap, Whole body draped, hand hygiene and acceptable skin prep).Skin Prep: Chloraprep         Injection Technique:  Ultrasound guided and Seldinger Technique    Sterile Ultrasound Technique:  Sterile probe cover and Sterile gel    Vein evaluated via U/S for patency/adequacy of catheter insertion and is adequate.  Using realtime U/S imaging the vein was punctured, and needle was observed entering vein on U/S      Permanent Image entered into patient's record      Catheter size:  9 Fr, 2 lumen 11.5 cm (MAC)    Cath secured with: suture and anchor securement device      Dressing:  Tegaderm and Biopatch    Complications:  None obvious    Blood aspirated all lumens: Yes      All Lumens Flushed: Yes      Verification method:  X-ray and Placement to be verified post-op    Person verifying:  Tavares

## 2019-08-18 NOTE — PROGRESS NOTES
Brief [facility] Progress Note  8/18/2019  [facility] 2     Date of Admission: 8/16/2019    S: Nursing notes reviewed. NAEON. Noted to have low fibrinogen, Hgb o/n. Blood, cryo ordered. Seen briefly this AM in advance of upcoming transplant. Reported fatigue, but happy about the news for transplant and said that her  and other family members were on their way. No particular complaints. States very ready for procedure    O:  Soft BPs o/n  Gen: chronically ill appearing woman in NAD breathing on RA  HEENT: at/nc, eomi, sclera icteric  CV: rrr  Resp: nonlabored on RA  Abd: soft, distended, nt  Ext: moving all 4 with no limitations  Neuro: alert, interactive, speech fluent    A/P  Nicci Pemberton is a 59 year old woman with a PMH of ESLD 2/2 ANGULO c/b h/o HE and SBP, HTN, A1AT and iron overload, hypothyroidism who was admitted on 8/16 for acute decompensated cirrhosis, GAMAL, and dehydration/weakness/fatigue in the setting of diarrhea. Patient with plan for liver transplant today. Will plan to transfer to transplant surgery team and defer further w/u and management to their care.     Ryan Suresh  Medical Student  [facility] 2 Service  [facility], [place]  Pager: [phone]  Date: 8/18/2019    Ingrid Zarco MD  Internal Medicine PGY3  P: [phone]

## 2019-08-18 NOTE — PLAN OF CARE
Pt A&O. VSS except for soft BPs. On 1/2L O2 NC for comfort. Up w A1 and walker to BR. 1 unit of blood given. 3rd unit of crypo infusing, 1 more unit to be transfused.  R PIV. Voiding. No BM. Transplant checklist and pre op checklist started. NPO.  Plan for liver transplant today at 11 am.

## 2019-08-18 NOTE — CONSULTS
SURGICAL ICU ADMISSION NOTE  08/18/2019  PRIMARY TEAM: Liver Transplant   PRIMARY PHYSICIAN: Dr. Alford  REASON FOR CRITICAL CARE ADMISSION: Monitoring of hemodynamics, coagulopathy, post-op liver transplant   ADMITTING PHYSICIAN: Dr. Lujan  Date of Service (when I saw the patient): 08/18/2019    ASSESSMENT: Nicci Pemberton 59F w/ PMH of ESLD (MELD 39) 2/2 ANGULO and alpha-1-antitrypsin deficiency c/b cirrhosis, SBP, lymphedema, anemia s/p DDLT on 8/18/19. Intraop EBL 5L, patient received 8L crystalloid, 3.6L cellsaver, 4 cryo, 13 FFP, 14 pRBC, 10 plt. Admitted to SICU for hemodynamic monitoring, coagulopathy, and post liver transplant care.     PLAN  - Admit to SICU  - Continue induction immunosuppression  - Replace coagulation factors, blood components, electrolytes as needed to meet goals     Neurological  # Acute postoperative pain  - Monitor neurological status. Delirium preventions and precautions.   - Pain: fentanyl, no acetaminophen  - Sedation: propofol, titrate to RAAS -1      # hx of hepatic encephalopathy    Pulmonary  # Post operative ventilatory support  - VENT VC-AC 20/500/8/100: Continue full vent support. PST when meets criteria. Ventilatory bundle. HOB elevation     Cardiovascular  # Shock - hypovolemic/hemorrhagic  - Goal 100 < SBP >140, 8 < CVP <12  - Monitor hemodynamic status with arterial line  - Fluid and product resuscitation to achieve goal   - Norepinephrine gtt for goal MAPs >65  - Dobutamine stress test 6/19/2019 negative for ischemia     # hx of HTN  - PTA spironolactone 100 mg BID, bumetanide 1 mg   - Hold while pressures soft    Gastroenterology/Nutrition  # ESLD 2/2 Alpha-1-antitrypsin and ANGULO s/p DDLT 8/18/19    # heterozygous (MZ genotype) for alpha-1-antitrypsin with secondary iron overload  # hyperbilirubinemia  # umbilical hernia  - MELD at transplant: 39  - most recent EGD 5/7/2019 without esophageal or gastric varices  - Q12h CMP, CBC  - U/S liver txp     # Protein calorie  deficit malnutrition   - No indication for parenteral nutrition.  - NPO     - PTA famotidine 20 mg BID    Fluids/Electrolytes  - D5 1/2NS @125 for IV fluid hydration    # hyponatremia  # hypocalcemia  - ICU electrolyte replacement protocol     Renal  # acute kidney injury, baseline Cr ~0.6  # history of hepatorenal syndrome  - Monitor intake and output.  - Goal UOP >0.50 ml/kg/hr  - CRRT   - Dialyzed intraoperatively through left internal jugular line, may require further dialysis pending progression of kidney function overnight     Endocrine  # Stress hyperglycemia   - Insulin gtt for glucose management. Goal to keep BG< 180 for optimal wound healing     # hx of hypothyroidism  - PTA levothyroxine 125 mcg daily     ID  # S/P liver transplant  - Continue zosyn, vanco, micafungin   - Continue ppx: valcyte  - continue immunosuppression: methylprednisolone, mycophenolate     - f/up cultures    # SBP  - Rifaximin      Heme  # Acute blood loss anemia, EBL 5L, received intraop 4 cryo, 13 FFP, 14 pRBC, 10 plt.  # thrombocytopenia  # coagulopathy  - Transfuse if hgb <8.0 or signs/symptoms of hypoperfusion  - Goal INR <2, Fibrinogen >200, plt >50  - Q4h hgb, PCT, fibrinogen, INR, lactate, ABG  - type and screen q72h     Musculoskeletal  # weakness and deconditioning of critical illness   # osteoarthritis  - Physical and occupational therapy consult when extubated     General Cares/Prophylaxis  DVT Prophylaxis: Pneumatic Compression Devices  GI Prophylaxis: pepcid BID   Restraints: No   Lines/ tubes/ drains:  - ETT  - L abd JPx1  - R abd OBED x1  - R internal jugular CVC  - L radial arterial line  - PIVx2  - Forrester  Code: Full  Disposition: Surgical ICU     Patient seen, findings and plan discussed with surgical ICU staff, Dr. Tai Cruz MD   SICU Resident   - - - - - - - - - - - - - - - - - - - - - - - - - - - - - - - - - - - - - - - - - - - - - -   HISTORY PRESENTING ILLNESS: Nicci MelloF w/ PMH of ESLD (MELD 39)  2/2 ANGULO and alpha-1-antitrypsin deficiency c/b cirrhosis, SBP, lymphedema, anemia s/p DDLT on 19. Intraop EBL 5L, patient received 8L crystalloid, 3.6L cellsaver, 4 cryo, 13 FFP, 14 pRBC, 10 plt. Admitted to SICU for hemodynamic monitoring, coagulopathy, and post liver transplant care.     REVIEW OF SYSTEMS: 10 point ROS neg other than the symptoms noted above in the HPI.  PAST MEDICAL HISTORY:   Past Medical History:   Diagnosis Date     Anemia      Cellulitis      Cirrhosis of liver (H) 2018     Heterozygous alpha 1-antitrypsin deficiency (H)      High hepatic iron concentration determined by biopsy of liver      Liver cirrhosis secondary to ANGULO (H)      Lymphedema      Osteoarthritis      SBP (spontaneous bacterial peritonitis) (H) 2019 in    SURGICAL HISTORY:   Past Surgical History:   Procedure Laterality Date     COLONOSCOPY N/A 2018    Procedure: COLONOSCOPY;  colonoscopy;  Surgeon: Hugo Patel MD;  Location:  OR   SOCIAL HISTORY:   Social History     Socioeconomic History     Marital status:      Spouse name: Not on file     Number of children: Not on file     Years of education: Not on file     Highest education level: Not on file   Occupational History     Not on file   Social Needs     Financial resource strain: Not on file     Food insecurity:     Worry: Not on file     Inability: Not on file     Transportation needs:     Medical: Not on file     Non-medical: Not on file   Tobacco Use     Smoking status: Former Smoker     Last attempt to quit: 2011     Years since quittin.6     Smokeless tobacco: Never Used     Tobacco comment: weekend smoker    Substance and Sexual Activity     Alcohol use: No     Drug use: No     Sexual activity: Not on file   Lifestyle     Physical activity:     Days per week: Not on file     Minutes per session: Not on file     Stress: Not on file   Relationships     Social connections:     Talks on phone: Not on file     Gets  together: Not on file     Attends Oriental orthodox service: Not on file     Active member of club or organization: Not on file     Attends meetings of clubs or organizations: Not on file     Relationship status: Not on file     Intimate partner violence:     Fear of current or ex partner: Not on file     Emotionally abused: Not on file     Physically abused: Not on file     Forced sexual activity: Not on file   Other Topics Concern     Parent/sibling w/ CABG, MI or angioplasty before 65F 55M? Not Asked   Social History Narrative     Not on file     ALLERGIES:   Allergies   Allergen Reactions     Food      Fruits with cores and pits     Sulfa Drugs Unknown     Swollen joints     Furosemide Rash   MEDICATIONS:    No current facility-administered medications on file prior to encounter.   Current Outpatient Medications on File Prior to Encounter:  bumetanide (BUMEX) 1 MG tablet Take 1 tablet (1 mg) by mouth daily   Cholecalciferol (VITAMIN D-3) 1000 units CAPS Take 1,000 Units by mouth 2 times daily   ciprofloxacin (CIPRO) 500 MG tablet Take 500 mg by mouth daily    famotidine (PEPCID) 20 MG tablet Take 20 mg by mouth 2 times daily    lactulose 20 GM/30ML SOLN Take 30 mLs by mouth 3 times daily   levothyroxine (SYNTHROID/LEVOTHROID) 125 MCG tablet Take 125 mcg by mouth daily   magnesium oxide (MAG-OX) 400 MG tablet Take 400 mg by mouth daily    oxyCODONE HCl (OXAYDO) 5 MG TABA Take 5 mg by mouth every 8 hours as needed   rifaximin (XIFAXAN) 550 MG TABS tablet Take 1 tablet (550 mg) by mouth 2 times daily   spironolactone (ALDACTONE) 100 MG tablet Take 100 mg by mouth 2 times daily   PHYSICAL EXAMINATION:  Temp:  [96.6  F (35.9  C)-98.1  F (36.7  C)] 97.9  F (36.6  C)  Pulse:  [71-84] 78  Resp:  [12-20] 16  BP: ()/(41-51) 104/50  FiO2 (%):  [100 %] 100 %  SpO2:  [92 %-100 %] 100 %  General: NAD, jaundiced, sedated, intubated  Pulm/Resp: Clear breath sounds bilaterally, breathing non-labored, vented  CV: Sinus tachycardia  on monitor and on auscultation   Abdomen: Soft, non-distended, non-tender, L JPx1 serous, R JPx1 serous, incision c/d/i  : durbin catheter in place, urine yellow and clear  MSK/Extremities: Prominent peripheral edema, www, calves soft  LABS:   Arterial Blood Gases   Recent Labs   Lab 08/18/19 2100 08/18/19 1903 08/18/19 1758 08/18/19  1710   PH 7.35 7.37 7.39 7.39   PCO2 29* 27* 29* 30*   PO2 173* 176* 97 84   HCO3 16* 16* 17* 18*   Complete Blood Count   Recent Labs   Lab 08/18/19 2100 08/18/19 1903 08/18/19 1758 08/18/19 1710 08/18/19  1700  08/18/19  0907 08/17/19  2341 08/17/19  0858   WBC 8.4  --   --   --   --   --  5.0 4.8 5.2   HGB 12.0 6.6* 9.5* 9.7* 7.4*   < > 7.4* 6.9* 7.6*   * 155 116*  --  146*   < > 29* 32* 39*    < > = values in this interval not displayed.   Basic Metabolic Panel  Recent Labs   Lab 08/18/19 1903 08/18/19 1758 08/18/19 1710 08/18/19  1700 08/18/19  0907 08/17/19  2341 08/17/19  0649 08/16/19  1119    131* 132* 134   < > 129* 129* 130* 126*   POTASSIUM 4.0 4.0 3.9 3.6   < > 4.6 4.5 4.8 5.0   CHLORIDE  --   --   --   --   --  98 97 97 94   CO2  --   --   --   --   --  22 23 25 25   BUN  --   --   --   --   --  46* 45* 46* 40*   CR  --   --   --   --   --  2.16* 2.04* 1.96* 2.30*   * 169* 161* 171*   < > 68* 91 64* 82    < > = values in this interval not displayed.   Liver Function Tests  Recent Labs   Lab 08/18/19 2100 08/18/19 1903 08/18/19  1758 08/18/19  1700  08/18/19  0907 08/17/19  2341 08/17/19  0649  08/16/19  1119   AST  --   --   --   --   --  68* 75* 76*  --  Canceled, Test credited   ALT  --   --   --   --   --  29 34 34  --  51*   ALKPHOS  --   --   --   --   --  86 87 92  --  152*   BILITOTAL  --   --   --   --   --  27.0* 26.9* 24.6*  --  24.2*   ALBUMIN  --   --   --   --   --  3.3* 3.4 2.7*  --  1.6*   INR 1.88* 2.26* 2.79* 2.12*   < > 2.52* 3.64* 3.46*   < >  --     < > = values in this interval not displayed.   Pancreatic  Enzymes  Recent Labs   Lab 08/17/19  2341 08/16/19  1119   LIPASE  --  364   AMYLASE 45  --    Coagulation Profile  Recent Labs   Lab 08/18/19  2100 08/18/19  1903 08/18/19  1758 08/18/19  1700 08/18/19  1605  08/17/19  2341   INR 1.88* 2.26* 2.79* 2.12* 3.38*   < > 3.64*   PTT 42*  --  103*  --  181*  --  66*    < > = values in this interval not displayed.   IMAGING:  Recent Results (from the past 24 hour(s))   XR Chest 2 Views    Narrative    Exam: XR CHEST 2 VW, 8/18/2019 12:14 AM    Indication: preop liver tx    Comparison: 8/16/2019    Findings:   PA and lateral views of the chest. Cardiac silhouette is unchanged  from prior. Small bilateral pleural effusions with associated  bibasilar streaky opacities. Mild streaky perihilar opacities. No  pneumothorax. Prominent loops of bowel in the upper abdomen mildly  improved from prior.      Impression    Impression:   1. Small bilateral pleural effusions with streaky perihilar and  bibasilar opacities which may represent atelectasis or infection.  2. Prominent air-filled loops of bowel in the upper abdomen are mildly  improved from prior.    I have personally reviewed the examination and initial interpretation  and I agree with the findings.    YONI BHAGAT MD   XR Chest Port 1 View    Narrative    Single view chest    Comparisons: Same date at 12:04 AM    HISTORY: Line placement    FINDINGS: Endotracheal tube is been placed with the tip in the mid  thoracic trachea. Nasogastric tube with tip and sidehole in the  stomach. Left IJ line with the tip in the high right atrium. Right IJ  sheath with the tip in the mid superior vena cava. Right IJ Culloden-Tameka  catheter with the tip in the right pulmonary artery. Right lung is  clear. Retrocardiac opacity with blunting of the left costophrenic  angle. Pulmonary vascularity is relatively distinct. Cardiac  silhouette is unchanged.      Impression    IMPRESSION:  1. Lines and tubes as described.  2. Left lower lobe  consolidation/atelectasis and left pleural  effusion.    ALEX SANCHEZ MD   XR Abdomen Port 1 View    Narrative    Single view of the abdomen    Comparisons: Same date at 1838    HISTORY: OG placement    FINDINGS: Orogastric tube with tip and sidehole in the stomach. Thin  biliary stent in the right abdomen. Drain in the left upper quadrant.  No dilated loop of bowel is identified. Surgical clip in the right  upper quadrant.      Impression    IMPRESSION: Orogastric tube in good position.    ALEX SANCHEZ MD

## 2019-08-18 NOTE — ANESTHESIA PROCEDURE NOTES
Central Line Procedure Note  Staff:     Anesthesiologist:  Elpidio Chapin MD  Location: In OR after induction  Procedure Start/Stop Times:     patient identified, IV checked, risks and benefits discussed, informed consent, monitors and equipment checked, pre-op evaluation and at physician/surgeon's request      Correct Patient: Yes      Correct Position: Yes      Correct Site: Yes      Correct Procedure: Yes      Correct Laterality:  N/A    Site Marked:  N/A  Line Placement:     Procedure:  Central line    Insertion laterality:  Left    Insertion site:  Internal Jugular    Position:  Trendelenburg      Maximal Sterile Barriers: All elements of maximal sterile barrier technique followed      (Maximal sterile barriers include:   Sterile gown, Sterile Gloves, Mask, Cap, Whole body draped, hand hygiene and acceptable skin prep).Skin Prep: Chloraprep         Injection Technique:  Ultrasound guided and Seldinger Technique    Sterile Ultrasound Technique:  Sterile probe cover and Sterile gel    Vein evaluated via U/S for patency/adequacy of catheter insertion and is adequate.  Using realtime U/S imaging the vein was punctured, and needle was observed entering vein on U/S      Permanent Image entered into patient's record      Catheter size:  Other (See Comment) (12 Fr, 20cm Mahurker (for dialysis))    Catheter length at skin (cm):  20    Cath secured with: suture      Dressing:  Tegaderm and Biopatch    Complications:  None obvious    Blood aspirated all lumens: Yes      All Lumens Flushed: Yes      Verification method:  Placement to be verified post-op    Person verifying:  Tavares

## 2019-08-18 NOTE — ANESTHESIA PROCEDURE NOTES
Arterial Line Procedure Note  Staff:     Anesthesiologist:  Elpidio Chapin MD    Resident/CRNA:  Parul Aguilar MD    Arterial line performed by resident/CRNA in presence of a teaching physician    Location: In OR After Induction  Procedure Start/Stop Times:     patient identified, IV checked, risks and benefits discussed, informed consent, monitors and equipment checked, pre-op evaluation and at physician/surgeon's request      Correct Patient: Yes      Correct Position: Yes      Correct Site: Yes      Correct Procedure: Yes      Correct Laterality:  N/A    Site Marked:  N/A  Line Placement:     Procedure:  Arterial Line    Insertion Site:  Radial    Insertion laterality:  Left    Skin Prep: Chloraprep      Patient Prep: patient draped, mask, sterile gloves, hat and hand hygiene      Local skin infiltration:  None    Catheter size:  20 gauge, Quick cath    Cath secured with: other (comment)      Cath secured with comment:  Benzoin    Dressing:  Tegaderm    Complications:  None obvious    Arterial waveform: Yes

## 2019-08-18 NOTE — CONSULTS
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 She was brought to the OR this morning for liver transplantation. In the OR, after extubation and a failed attempt to pass an NG, the patient began bleeding from bilateral nares, L>R, as well as her mouth. The anaesthesia team attempted afrin and pressure but were unable to control the bleeding. She remains intubated at this point, no issues ventilating the patient.     Patient intubated and sedated, history obtained via chart review and discussion with primary surgical team and anaesthesia team.     Past Medical History:   Diagnosis Date     Anemia      Cellulitis      Cirrhosis of liver (H) 6/18/2018     Heterozygous alpha 1-antitrypsin deficiency (H)      High hepatic iron concentration determined by biopsy of liver      Liver cirrhosis secondary to ANGULO (H)      Lymphedema      Osteoarthritis      SBP (spontaneous bacterial peritonitis) (H) 8/2/2019 8/1/19 in [facility]       Past Surgical History:   Procedure Laterality Date     COLONOSCOPY N/A 6/22/2018    Procedure: COLONOSCOPY;  colonoscopy;  Surgeon: Hugo Patel MD;  Location:  OR       No current outpatient medications on file.          Allergies   Allergen Reactions     Food      Fruits with cores and pits     Sulfa Drugs Unknown     Swollen joints     Furosemide Rash       Social History     Socioeconomic History     Marital status:      Spouse name: Not on file     Number of children: Not on file     Years of education: Not on file     Highest education level: Not on file   Occupational History     Not on file   Social Needs     Financial resource strain: Not on file     Food insecurity:     Worry: Not on file     Inability: Not on  file     Transportation needs:     Medical: Not on file     Non-medical: Not on file   Tobacco Use     Smoking status: Former Smoker     Last attempt to quit: 2011     Years since quittin.6     Smokeless tobacco: Never Used     Tobacco comment: weekend smoker    Substance and Sexual Activity     Alcohol use: No     Drug use: No     Sexual activity: Not on file   Lifestyle     Physical activity:     Days per week: Not on file     Minutes per session: Not on file     Stress: Not on file   Relationships     Social connections:     Talks on phone: Not on file     Gets together: Not on file     Attends [employer] service: Not on file     Active member of club or organization: Not on file     Attends meetings of clubs or organizations: Not on file     Relationship status: Not on file     Intimate partner violence:     Fear of current or ex partner: Not on file     Emotionally abused: Not on file     Physically abused: Not on file     Forced sexual activity: Not on file   Other Topics Concern     Parent/sibling w/ CABG, MI or angioplasty before 65F 55M? Not Asked   Social History Narrative     Not on file       Family History   Problem Relation Age of Onset     Cirrhosis No family hx of      Liver Cancer No family hx of        ROS: Unable to perform due to patient condition.    PHYSICAL EXAM:  /50   Pulse 78   Temp 97.9  F (36.6  C) (Oral)   Resp 16   Wt 105.7 kg (233 lb)   SpO2 93%   BMI 42.62 kg/m      General: laying supine on operating table  HEAD: normocephalic, atraumatic  Face: symmetrical, unable to assess facial nerve function or facial sensation  Eyes: taped closed with eye tapes  Nose: Oozing of blood from bilateral nares, L>R, see scope exam for details  Mouth: Intubated, OG in place, blood pooling from back of throat, scattered areas of mucosal on the tongue  Oropharynx: unable to visualize due to ETT  Neck: no LAD, trachea midline  Neuro: sedated    ROUTINE IP LABS (Last four  results)  BMP  Recent Labs   Lab 08/18/19  1307 08/18/19  1140 08/18/19  0907 08/17/19  2341 08/17/19  0649 08/16/19  1119   * 127* 129* 129* 130* 126*   POTASSIUM 3.9 4.5 4.6 4.5 4.8 5.0   CHLORIDE  --   --  98 97 97 94   JACOB  --   --  8.7 8.4* 8.4* 8.2*   CO2  --   --  22 23 25 25   BUN  --   --  46* 45* 46* 40*   CR  --   --  2.16* 2.04* 1.96* 2.30*   * 69* 68* 91 64* 82     CBC  Recent Labs   Lab 08/18/19  1307 08/18/19  1140 08/18/19  0907 08/17/19  2341 08/17/19  0858 08/17/19  0649   WBC  --   --  5.0 4.8 5.2 4.6   RBC  --   --  1.98* 1.77* 1.97* 1.85*   HGB 7.0* 7.0* 7.4* 6.9* 7.6* 7.1*   HCT  --   --  23.0* 21.3* 23.0* 22.2*   MCV  --   --  116* 120* 117* 120*   MCH  --   --  37.4* 39.0* 38.6* 38.4*   MCHC  --   --  32.2 32.4 33.0 32.0   RDW  --   --  18.1* 15.2* 15.0 15.0   PLT  --   --  29* 32* 39* 30*     INR  Recent Labs   Lab 08/18/19  0907 08/17/19  2341 08/17/19  0649   INR 2.52* 3.64* 3.46*       Procedures:  RIGID NASAL ENDOSCOPY:  Due to diffuse upper airway bleeding, rigid endoscopy was indicated. The rigid endoscope was passed through bilateral nares, left first and then the right. The scope was advanced with a katiana suction in front of it, able to visualize approximately FDC down the inferior turbinate before blood pools up and obstructs visualization. No bleeding vessels identified, only diffuse mucosal oozing.     NASAL AND OROPHARYNGEAL PACKING:  Due to uncontrolled diffuse oropharyngeal bleeding, the above procedure was indicated. One strip of 1 inch neugauze was soaked in afrin and packed through the left nasal cavity to the back of the nasopharynx up to the anterior nares with a bayonett. Floseal was placed in the right nasal cavity. The bleeding slowed but was not controlled. At this point approximately one half of one roll of afrin soaked kerlix was packed into the oropharynx and oral cavity, taking care to ensure that the endotracheal tube was not dislodge. This  greatly improved hemostasis. She was ventilating well at the end of the procedure.     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 She had diffuse nasal and oropharyngeal bleeding after intubation and attempted NG placement. Unable to identify a specific source of her bleeding. Her coagulopathy related to her liver disease is the primary  of her hemorrhage, there are no identifiable sources of bleeding that are amenable to direct intervention. Her left nasal passage and oropharynx were packed with neugauze and kerlix, these will need to be removed prior to extubation. Floseal placed in right nasal passage, this will dissolve on its own.     - recommend correction of coagulopathy as able  - please keep patient intubated, ENT will plan to remove packing in 2-3 days  - apply afrin PRN to nasal and oral packing   - patient likely will continue to ooze to some degree until her coagulopathy improves  - do not remove packing, but OK to place additional dressings over top for ongoing oozing  - recommend keflex or other antibiotic to cover staph while packing in place  - all other cares per primary team  - please contact ENT on call with any questions or concerns      Discussed with chief resident and staff on call.    Sarwat Rocha MD  Otolaryngology-Head & Neck Surgery PGY2  Please page ENT with questions by dialing [phone] and entering job [phone] when prompted.

## 2019-08-18 NOTE — PROGRESS NOTES
CLINICAL NUTRITION SERVICES - ASSESSMENT NOTE        Nutrition Prescription  RECOMMENDATIONS FOR MDs/PROVIDERS TO ORDER:  **Recommend FT placement after/during surgery and initiate TF post op. Suggest following TF therapy:     IF renal function adequate:       1. Order Nutren 1.5 @ 10 ml/hr and advance by 15 ml every 8 hrs ( ONLY advance if K+/mg++ WNL and Phos >1.9 and Per MD discretion pending GI postop status ) to initial goal  45 ml/hr  (1080 ml/day)     2. Additionally order Prosource 1 pkts TID (additional 120 kcals, 33 gm PRO)     -Nutren 1.5 @ 45 mL/hr to provide 1620 kcals (25 kcal/kg/day), 73 gm PRO (1.1 gm/kg/day), 821 mL H2O, 190 gm CHO and no Fiber daily.  ---TF + Prosource: 1740 kcal ( 27 kcal /kg) and 106 gm protein /day(1.7 gm/kg).     3.  Monitor K+, Mg++, Phos with TF advancement x 3 days     **If renal formula is necessary for TF,      1. Order Nepro, Start 1 0 ml/hr x 8 hours, increase rate by 10 ml Q 8 hrs, to goal rate @ 40 ml/hr ( ONLY advance if K+/mg++ WNL and Phos >1.9).     --Nepro @ goal 40 ml/hr (960 ml/day) to provide 1728 kcals (27 kcal/kg/day), 78 gm PRO (1.2 gm/kg/day), 701 ml free H2O, 155 gm CHO and 12 gm Fiber daily.     --Additionally Order Prosource 1 packet TID, (additional 120 kcals and 33 gm PRO ( 1840 kcal , 29 kcal/kg and 111 gm protein, 1.7 gm/kg).       Malnutrition Status:    --Likely non severe malnutrition in the context of chronic condition      Recommendations already ordered by Registered Dietitian (RD):  --None, Patient NPO      Future/Additional Recommendations:  ---Once diet advances beyond clear liquids, May Order calorie counts.  Start oral supplements, such as Boost Glucose Control with meals when on at least on a full liquid diet.     - Calorie counts when diet > full liquids       REASON FOR ASSESSMENT  Nicci Pemberton is a/an 59 year old female assessed by the dietitian for Provider Order - Liver Transplant Pre Op    Chart reviewed:  Decompensated cirrhosis  2/2 ANGULO , Lymphedema, h/o HE and SBP, HTN, A1AT and iron overload,    --Admitted on 8/16 for acute decompensated cirrhosis, GAMAL, and dehydration/weakness/fatigue in the setting of diarrhea.   --GAMAL: HRS  --MELD: 39  ---Liver transplant today !!      NUTRITION HISTORY  ---Unable to assess. Patient off floor in OR for Liver transplant.     ----Per review of EMR, pt previously seen by outpatient RD on 4/24/2018 for liver txp evaluation. At that encounter patient reported, following a low salt diet.     -- During outpatient RD visit, Patient was educated on Low salt, high protein diet +  post txp diet guidelines, handout given at that time.   --Starting on CRRT       CURRENT NUTRITION ORDERS  Diet: NPO for OR  Intake/Tolerance: Was on 2 gm Na+ diet on 8/16+8/17. Patient consumed 75% of 2 meals and 50% of bed time snack    LABS  T. bili: 27 (H)  Na+: 127 (L), BUN: 46 (H), Cr: 2.16(H)  Mg++: 2.5(H), Phos: 3.8 (normal), K+: 4.6 (Normal)     MEDICATIONS  Medications reviewed    ANTHROPOMETRICS  Height: 0 cm (Data Unavailable) 157.5 cm (   Most Recent Weight: 105.7 kg (233 lb) admit wt on 8/16/19  IBW: 50 kg (211% IBW)   BMI: 42.62 kg/m2 - Obesity Grade III BMI >40 ( Has severe Lymphedema per chart note)   Weight History:   Wt Readings from Last 15 Encounters:   08/16/19 105.7 kg (233 lb)   07/11/19 100.7 kg (222 lb)   04/18/19 93.4 kg (205 lb 14.4 oz)   02/19/19 107.6 kg (237 lb 3.2 oz)   11/20/18 105.4 kg (232 lb 6.4 oz)   08/21/18 103.1 kg (227 lb 3.2 oz)   06/27/18 101.9 kg (224 lb 9.6 oz)   06/22/18 97.5 kg (215 lb)   06/18/18 101.1 kg (222 lb 14.4 oz)   04/24/18 100 kg (220 lb 8 oz)       Dosing Weight: 64 kg adjusted wt from admit wt of 105.7 kg on 7/16 and IBW of 50 kg     ASSESSED NUTRITION NEEDS  Estimated Energy Needs:  6060-4894 kcals/day (25 - 30 kcals/kg)  Justification: Post transplant Maintenance.   Estimated Protein Needs:   grams protein/day (1.5 - 2 grams of pro/kg)  Justification:  Post-op SOT  course, CRRT    Estimated Fluid Needs:  Justification: Per provider pending fluid status     PHYSICAL FINDINGS  See malnutrition section below.     MALNUTRITION  % Intake:  Consumed 50-75% of meals since admit, NPO today   % Weight Loss: Unable to assess due to water wt fluctuation vs. None noted   Subcutaneous Fat Loss:  Unable to assess  Muscle Loss: Temporal : Unable to assess   Fluid Accumulation/Edema: L ymphedema   Malnutrition Diagnosis:  Likely Non severe malnutrition in the context of chronic condition      NUTRITION DIAGNOSIS  Inadequate oral intake related to pre-transplant surgery course, and likely NPO/CL status post-operatively liver transplant as evidenced by NPO status x 1 day since last night with plan for liver transplant surgery today      INTERVENTIONS  Implementation  Nutrition Education:  unable to see patient. Off floor in OR  Enteral Nutrition - recs above       Goals  Diet adv v nutrition support within 2-3 days.     Monitoring/Evaluation  Progress toward goals will be monitored and evaluated per protocol.        Abhijit Osullivan RD/SABRINA  Pager [phone]

## 2019-08-18 NOTE — PHARMACY-VANCOMYCIN DOSING SERVICE
Pharmacy Vancomycin Initial Note  Date of Service 2019  Patient's  1960  59 year old, female    Indication: Pre-op     Current estimated CrCl = CrCl cannot be calculated (This lab value cannot be used to calculate CrCl because it is not a number: PENDING).    Creatinine for last 3 days  2019: 11:19 AM Creatinine 2.30 mg/dL  2019:  6:49 AM Creatinine 1.96 mg/dL; 11:41 PM Creatinine 2.04 mg/dL  2019:  9:07 AM Creatinine PENDING mg/dL    Recent Vancomycin Level(s) for last 3 days  No results found for requested labs within last 72 hours.      Vancomycin IV Administrations (past 72 hours)      No vancomycin orders with administrations in past 72 hours.                Nephrotoxins and other renal medications (From now, onward)    Start     Dose/Rate Route Frequency Ordered Stop    19 0930  vasopressin (VASOSTRICT) 40 Units in D5W 40 mL infusion      0.5-4 Units/hr  0.5-4 mL/hr  Intravenous CONTINUOUS 19 0919      19 0800  vancomycin (VANCOCIN) 1,750 mg in sodium chloride 0.9 % 500 mL intermittent infusion      1,750 mg  over 2 Hours Intravenous ONCE 19 0756      19 2200  piperacillin-tazobactam (ZOSYN) 3.375 g vial to attach to  mL bag      3.375 g  over 30 Minutes Intravenous ONCE 19 2158      19 2200  piperacillin-tazobactam (ZOSYN) 2.25 g vial to attach to  ml bag      2.25 g  over 30 Minutes Intravenous EVERY 2 HOURS PRN 19 2158            Contrast Orders - past 72 hours (72h ago, onward)    None                Plan:  1.  Start vancomycin  1750 mg IVPB once.    2.  Goal Trough Level: 15-20 mg/L     César Spaulding Pharm.D, BCPS

## 2019-08-19 ENCOUNTER — PRE VISIT (OUTPATIENT)
Dept: GASTROENTEROLOGY | Facility: CLINIC | Age: 59
End: 2019-08-19

## 2019-08-19 ENCOUNTER — AMBULATORY - HEALTHEAST (OUTPATIENT)
Dept: GERIATRICS | Facility: CLINIC | Age: 59
End: 2019-08-19

## 2019-08-19 ENCOUNTER — DOCUMENTATION ONLY (OUTPATIENT)
Dept: TRANSPLANT | Facility: CLINIC | Age: 59
End: 2019-08-19

## 2019-08-19 ENCOUNTER — APPOINTMENT (OUTPATIENT)
Dept: ULTRASOUND IMAGING | Facility: CLINIC | Age: 59
DRG: 005 | End: 2019-08-19
Attending: NURSE PRACTITIONER
Payer: COMMERCIAL

## 2019-08-19 LAB
ALBUMIN SERPL-MCNC: 1.8 G/DL (ref 3.4–5)
ALBUMIN SERPL-MCNC: 2.3 G/DL (ref 3.4–5)
ALBUMIN SERPL-MCNC: 2.4 G/DL (ref 3.4–5)
ALBUMIN SERPL-MCNC: 2.6 G/DL (ref 3.4–5)
ALBUMIN SERPL-MCNC: 2.6 G/DL (ref 3.4–5)
ALP SERPL-CCNC: 40 U/L (ref 40–150)
ALP SERPL-CCNC: 46 U/L (ref 40–150)
ALP SERPL-CCNC: 52 U/L (ref 40–150)
ALP SERPL-CCNC: 57 U/L (ref 40–150)
ALP SERPL-CCNC: 62 U/L (ref 40–150)
ALP SERPL-CCNC: 68 U/L (ref 40–150)
ALP SERPL-CCNC: 71 U/L (ref 40–150)
ALT SERPL W P-5'-P-CCNC: 215 U/L (ref 0–50)
ALT SERPL W P-5'-P-CCNC: 238 U/L (ref 0–50)
ALT SERPL W P-5'-P-CCNC: 313 U/L (ref 0–50)
ALT SERPL W P-5'-P-CCNC: 461 U/L (ref 0–50)
ALT SERPL W P-5'-P-CCNC: 492 U/L (ref 0–50)
ALT SERPL W P-5'-P-CCNC: 520 U/L (ref 0–50)
ALT SERPL W P-5'-P-CCNC: 528 U/L (ref 0–50)
AMYLASE SERPL-CCNC: 326 U/L (ref 30–110)
ANGLE RATE OF CLOT STRENGTH: 66.9 DEGREES (ref 53–72)
ANION GAP SERPL CALCULATED.3IONS-SCNC: 10 MMOL/L (ref 3–14)
ANION GAP SERPL CALCULATED.3IONS-SCNC: 11 MMOL/L (ref 3–14)
ANION GAP SERPL CALCULATED.3IONS-SCNC: 12 MMOL/L (ref 3–14)
ANION GAP SERPL CALCULATED.3IONS-SCNC: 14 MMOL/L (ref 3–14)
ANION GAP SERPL CALCULATED.3IONS-SCNC: 16 MMOL/L (ref 3–14)
ANION GAP SERPL CALCULATED.3IONS-SCNC: 21 MMOL/L (ref 3–14)
ANION GAP SERPL CALCULATED.3IONS-SCNC: 9 MMOL/L (ref 3–14)
ANISOCYTOSIS BLD QL SMEAR: SLIGHT
AST SERPL W P-5'-P-CCNC: 342 U/L (ref 0–45)
AST SERPL W P-5'-P-CCNC: 381 U/L (ref 0–45)
AST SERPL W P-5'-P-CCNC: 437 U/L (ref 0–45)
AST SERPL W P-5'-P-CCNC: 593 U/L (ref 0–45)
AST SERPL W P-5'-P-CCNC: 626 U/L (ref 0–45)
AST SERPL W P-5'-P-CCNC: 715 U/L (ref 0–45)
AST SERPL W P-5'-P-CCNC: 738 U/L (ref 0–45)
BACTERIA SPEC CULT: NORMAL
BASE DEFICIT BLDA-SCNC: 1.4 MMOL/L
BASE DEFICIT BLDA-SCNC: 2.8 MMOL/L
BASE DEFICIT BLDA-SCNC: 5.8 MMOL/L
BASE DEFICIT BLDA-SCNC: 8.4 MMOL/L
BASE EXCESS BLDA CALC-SCNC: 0.1 MMOL/L
BASE EXCESS BLDA CALC-SCNC: 0.4 MMOL/L
BASE EXCESS BLDA CALC-SCNC: 1.4 MMOL/L
BASOPHILS # BLD AUTO: 0 10E9/L (ref 0–0.2)
BASOPHILS NFR BLD AUTO: 0 %
BASOPHILS NFR BLD AUTO: 0.1 %
BILIRUB DIRECT SERPL-MCNC: 3.7 MG/DL (ref 0–0.2)
BILIRUB SERPL-MCNC: 6.5 MG/DL (ref 0.2–1.3)
BILIRUB SERPL-MCNC: 7.7 MG/DL (ref 0.2–1.3)
BILIRUB SERPL-MCNC: 8 MG/DL (ref 0.2–1.3)
BILIRUB SERPL-MCNC: 8.1 MG/DL (ref 0.2–1.3)
BILIRUB SERPL-MCNC: 8.5 MG/DL (ref 0.2–1.3)
BILIRUB SERPL-MCNC: 8.9 MG/DL (ref 0.2–1.3)
BILIRUB SERPL-MCNC: 9.3 MG/DL (ref 0.2–1.3)
BLD PROD TYP BPU: NORMAL
BLD UNIT ID BPU: 0
BLOOD PRODUCT CODE: NORMAL
BPU ID: NORMAL
BUN SERPL-MCNC: 19 MG/DL (ref 7–30)
BUN SERPL-MCNC: 20 MG/DL (ref 7–30)
BUN SERPL-MCNC: 22 MG/DL (ref 7–30)
BUN SERPL-MCNC: 23 MG/DL (ref 7–30)
BUN SERPL-MCNC: 26 MG/DL (ref 7–30)
BUN SERPL-MCNC: 27 MG/DL (ref 7–30)
BUN SERPL-MCNC: 30 MG/DL (ref 7–30)
BURR CELLS BLD QL SMEAR: SLIGHT
CA-I BLD-MCNC: 4.7 MG/DL (ref 4.4–5.2)
CA-I BLD-MCNC: 5 MG/DL (ref 4.4–5.2)
CA-I SERPL ISE-MCNC: 4.9 MG/DL (ref 4.4–5.2)
CA-I SERPL ISE-MCNC: 5 MG/DL (ref 4.4–5.2)
CA-I SERPL ISE-MCNC: 5 MG/DL (ref 4.4–5.2)
CA-I SERPL ISE-MCNC: 5.3 MG/DL (ref 4.4–5.2)
CALCIUM SERPL-MCNC: 7.8 MG/DL (ref 8.5–10.1)
CALCIUM SERPL-MCNC: 8.1 MG/DL (ref 8.5–10.1)
CALCIUM SERPL-MCNC: 8.6 MG/DL (ref 8.5–10.1)
CALCIUM SERPL-MCNC: 8.7 MG/DL (ref 8.5–10.1)
CALCIUM SERPL-MCNC: 9.3 MG/DL (ref 8.5–10.1)
CALCIUM SERPL-MCNC: 9.3 MG/DL (ref 8.5–10.1)
CALCIUM SERPL-MCNC: 9.9 MG/DL (ref 8.5–10.1)
CHLORIDE SERPL-SCNC: 103 MMOL/L (ref 94–109)
CHLORIDE SERPL-SCNC: 104 MMOL/L (ref 94–109)
CHLORIDE SERPL-SCNC: 105 MMOL/L (ref 94–109)
CHLORIDE SERPL-SCNC: 105 MMOL/L (ref 94–109)
CHLORIDE SERPL-SCNC: 106 MMOL/L (ref 94–109)
CHLORIDE SERPL-SCNC: 106 MMOL/L (ref 94–109)
CHLORIDE SERPL-SCNC: 108 MMOL/L (ref 94–109)
CI HYPERCOAGULATION INDEX: 0.4 RATIO (ref 0–3)
CO2 SERPL-SCNC: 16 MMOL/L (ref 20–32)
CO2 SERPL-SCNC: 18 MMOL/L (ref 20–32)
CO2 SERPL-SCNC: 20 MMOL/L (ref 20–32)
CO2 SERPL-SCNC: 22 MMOL/L (ref 20–32)
CO2 SERPL-SCNC: 23 MMOL/L (ref 20–32)
CO2 SERPL-SCNC: 23 MMOL/L (ref 20–32)
CO2 SERPL-SCNC: 24 MMOL/L (ref 20–32)
CREAT SERPL-MCNC: 1.19 MG/DL (ref 0.52–1.04)
CREAT SERPL-MCNC: 1.22 MG/DL (ref 0.52–1.04)
CREAT SERPL-MCNC: 1.28 MG/DL (ref 0.52–1.04)
CREAT SERPL-MCNC: 1.28 MG/DL (ref 0.52–1.04)
CREAT SERPL-MCNC: 1.29 MG/DL (ref 0.52–1.04)
CREAT SERPL-MCNC: 1.33 MG/DL (ref 0.52–1.04)
CREAT SERPL-MCNC: 1.53 MG/DL (ref 0.52–1.04)
DIFFERENTIAL METHOD BLD: ABNORMAL
ELLIPTOCYTES BLD QL SMEAR: SLIGHT
EOSINOPHIL # BLD AUTO: 0 10E9/L (ref 0–0.7)
EOSINOPHIL NFR BLD AUTO: 0 %
ERYTHROCYTE [DISTWIDTH] IN BLOOD BY AUTOMATED COUNT: 16.3 % (ref 10–15)
ERYTHROCYTE [DISTWIDTH] IN BLOOD BY AUTOMATED COUNT: 16.5 % (ref 10–15)
ERYTHROCYTE [DISTWIDTH] IN BLOOD BY AUTOMATED COUNT: 16.6 % (ref 10–15)
ERYTHROCYTE [DISTWIDTH] IN BLOOD BY AUTOMATED COUNT: 16.7 % (ref 10–15)
ERYTHROCYTE [DISTWIDTH] IN BLOOD BY AUTOMATED COUNT: 16.8 % (ref 10–15)
ERYTHROCYTE [DISTWIDTH] IN BLOOD BY AUTOMATED COUNT: 16.9 % (ref 10–15)
ERYTHROCYTE [DISTWIDTH] IN BLOOD BY AUTOMATED COUNT: 16.9 % (ref 10–15)
ERYTHROCYTE [DISTWIDTH] IN BLOOD BY AUTOMATED COUNT: 17 % (ref 10–15)
FIBRINOGEN PPP-MCNC: 180 MG/DL (ref 200–420)
FIBRINOGEN PPP-MCNC: 181 MG/DL (ref 200–420)
FIBRINOGEN PPP-MCNC: 202 MG/DL (ref 200–420)
FIBRINOGEN PPP-MCNC: 215 MG/DL (ref 200–420)
FIBRINOGEN PPP-MCNC: 223 MG/DL (ref 200–420)
FIBRINOGEN PPP-MCNC: 242 MG/DL (ref 200–420)
G ACTUAL CLOT STRENGTH: 5.5 KD/SC (ref 4.5–11)
GFR SERPL CREATININE-BSD FRML MDRD: 37 ML/MIN/{1.73_M2}
GFR SERPL CREATININE-BSD FRML MDRD: 44 ML/MIN/{1.73_M2}
GFR SERPL CREATININE-BSD FRML MDRD: 45 ML/MIN/{1.73_M2}
GFR SERPL CREATININE-BSD FRML MDRD: 46 ML/MIN/{1.73_M2}
GFR SERPL CREATININE-BSD FRML MDRD: 46 ML/MIN/{1.73_M2}
GFR SERPL CREATININE-BSD FRML MDRD: 48 ML/MIN/{1.73_M2}
GFR SERPL CREATININE-BSD FRML MDRD: 50 ML/MIN/{1.73_M2}
GLUCOSE BLDC GLUCOMTR-MCNC: 115 MG/DL (ref 70–99)
GLUCOSE BLDC GLUCOMTR-MCNC: 117 MG/DL (ref 70–99)
GLUCOSE BLDC GLUCOMTR-MCNC: 117 MG/DL (ref 70–99)
GLUCOSE BLDC GLUCOMTR-MCNC: 128 MG/DL (ref 70–99)
GLUCOSE BLDC GLUCOMTR-MCNC: 131 MG/DL (ref 70–99)
GLUCOSE BLDC GLUCOMTR-MCNC: 131 MG/DL (ref 70–99)
GLUCOSE BLDC GLUCOMTR-MCNC: 132 MG/DL (ref 70–99)
GLUCOSE BLDC GLUCOMTR-MCNC: 134 MG/DL (ref 70–99)
GLUCOSE BLDC GLUCOMTR-MCNC: 139 MG/DL (ref 70–99)
GLUCOSE BLDC GLUCOMTR-MCNC: 146 MG/DL (ref 70–99)
GLUCOSE BLDC GLUCOMTR-MCNC: 147 MG/DL (ref 70–99)
GLUCOSE BLDC GLUCOMTR-MCNC: 147 MG/DL (ref 70–99)
GLUCOSE BLDC GLUCOMTR-MCNC: 149 MG/DL (ref 70–99)
GLUCOSE BLDC GLUCOMTR-MCNC: 152 MG/DL (ref 70–99)
GLUCOSE BLDC GLUCOMTR-MCNC: 159 MG/DL (ref 70–99)
GLUCOSE BLDC GLUCOMTR-MCNC: 161 MG/DL (ref 70–99)
GLUCOSE BLDC GLUCOMTR-MCNC: 164 MG/DL (ref 70–99)
GLUCOSE BLDC GLUCOMTR-MCNC: 201 MG/DL (ref 70–99)
GLUCOSE SERPL-MCNC: 131 MG/DL (ref 70–99)
GLUCOSE SERPL-MCNC: 134 MG/DL (ref 70–99)
GLUCOSE SERPL-MCNC: 142 MG/DL (ref 70–99)
GLUCOSE SERPL-MCNC: 152 MG/DL (ref 70–99)
GLUCOSE SERPL-MCNC: 153 MG/DL (ref 70–99)
GLUCOSE SERPL-MCNC: 173 MG/DL (ref 70–99)
GLUCOSE SERPL-MCNC: 211 MG/DL (ref 70–99)
HBV CORE AB SERPL QL IA: NONREACTIVE
HBV SURFACE AB SERPL IA-ACNC: 0 M[IU]/ML
HCO3 BLD-SCNC: 15 MMOL/L (ref 21–28)
HCO3 BLD-SCNC: 17 MMOL/L (ref 21–28)
HCO3 BLD-SCNC: 19 MMOL/L (ref 21–28)
HCO3 BLD-SCNC: 23 MMOL/L (ref 21–28)
HCO3 BLD-SCNC: 23 MMOL/L (ref 21–28)
HCO3 BLD-SCNC: 24 MMOL/L (ref 21–28)
HCO3 BLD-SCNC: 25 MMOL/L (ref 21–28)
HCT VFR BLD AUTO: 20.9 % (ref 35–47)
HCT VFR BLD AUTO: 22.8 % (ref 35–47)
HCT VFR BLD AUTO: 23.3 % (ref 35–47)
HCT VFR BLD AUTO: 24.3 % (ref 35–47)
HCT VFR BLD AUTO: 27.5 % (ref 35–47)
HCT VFR BLD AUTO: 27.5 % (ref 35–47)
HCT VFR BLD AUTO: 30.1 % (ref 35–47)
HCT VFR BLD AUTO: 35.6 % (ref 35–47)
HCV AB SERPL QL IA: NONREACTIVE
HGB BLD-MCNC: 10.5 G/DL (ref 11.7–15.7)
HGB BLD-MCNC: 12 G/DL (ref 11.7–15.7)
HGB BLD-MCNC: 7.2 G/DL (ref 11.7–15.7)
HGB BLD-MCNC: 8.2 G/DL (ref 11.7–15.7)
HGB BLD-MCNC: 8.2 G/DL (ref 11.7–15.7)
HGB BLD-MCNC: 8.3 G/DL (ref 11.7–15.7)
HGB BLD-MCNC: 9.5 G/DL (ref 11.7–15.7)
HGB BLD-MCNC: 9.8 G/DL (ref 11.7–15.7)
HIV 1+2 AB+HIV1 P24 AG SERPL QL IA: NONREACTIVE
IMM GRANULOCYTES # BLD: 0.1 10E9/L (ref 0–0.4)
IMM GRANULOCYTES # BLD: 0.1 10E9/L (ref 0–0.4)
IMM GRANULOCYTES # BLD: 0.3 10E9/L (ref 0–0.4)
IMM GRANULOCYTES NFR BLD: 0.9 %
IMM GRANULOCYTES NFR BLD: 1.5 %
IMM GRANULOCYTES NFR BLD: 2.7 %
INR PPP: 1.57 (ref 0.86–1.14)
INR PPP: 1.6 (ref 0.86–1.14)
INR PPP: 1.77 (ref 0.86–1.14)
INR PPP: 1.81 (ref 0.86–1.14)
INR PPP: 1.89 (ref 0.86–1.14)
INTERPRETATION ECG - MUSE: NORMAL
K TIME TO SPEC CLOT STRENGTH: 2 MINUTE (ref 1–3)
LACTATE BLD-SCNC: 1.6 MMOL/L (ref 0.7–2)
LACTATE BLD-SCNC: 2.3 MMOL/L (ref 0.7–2)
LACTATE BLD-SCNC: 3.2 MMOL/L (ref 0.7–2)
LACTATE BLD-SCNC: 5 MMOL/L (ref 0.7–2)
LACTATE BLD-SCNC: 7.6 MMOL/L (ref 0.7–2)
LACTATE BLD-SCNC: 9.7 MMOL/L (ref 0.7–2)
LACTATE SERPL-SCNC: 4 MMOL/L (ref 0.4–2)
LIPASE SERPL-CCNC: 2509 U/L (ref 73–393)
LY30 LYSIS AT 30 MINUTES: 0 % (ref 0–8)
LY60 LYSIS AT 60 MINUTES: 0 % (ref 0–15)
LYMPHOCYTES # BLD AUTO: 0 10E9/L (ref 0.8–5.3)
LYMPHOCYTES # BLD AUTO: 0.1 10E9/L (ref 0.8–5.3)
LYMPHOCYTES # BLD AUTO: 0.2 10E9/L (ref 0.8–5.3)
LYMPHOCYTES # BLD AUTO: 0.2 10E9/L (ref 0.8–5.3)
LYMPHOCYTES # BLD AUTO: 0.3 10E9/L (ref 0.8–5.3)
LYMPHOCYTES NFR BLD AUTO: 0 %
LYMPHOCYTES NFR BLD AUTO: 1.4 %
LYMPHOCYTES NFR BLD AUTO: 1.7 %
LYMPHOCYTES NFR BLD AUTO: 2.3 %
LYMPHOCYTES NFR BLD AUTO: 3.1 %
MA MAXIMUM CLOT STRENGTH: 52.4 MM (ref 50–70)
MAGNESIUM SERPL-MCNC: 2.2 MG/DL (ref 1.6–2.3)
MAGNESIUM SERPL-MCNC: 2.3 MG/DL (ref 1.6–2.3)
MAGNESIUM SERPL-MCNC: 2.3 MG/DL (ref 1.6–2.3)
MAGNESIUM SERPL-MCNC: 2.4 MG/DL (ref 1.6–2.3)
MAGNESIUM SERPL-MCNC: 2.5 MG/DL (ref 1.6–2.3)
MCH RBC QN AUTO: 30.2 PG (ref 26.5–33)
MCH RBC QN AUTO: 30.5 PG (ref 26.5–33)
MCH RBC QN AUTO: 30.5 PG (ref 26.5–33)
MCH RBC QN AUTO: 30.6 PG (ref 26.5–33)
MCH RBC QN AUTO: 31 PG (ref 26.5–33)
MCH RBC QN AUTO: 31.4 PG (ref 26.5–33)
MCHC RBC AUTO-ENTMCNC: 33.7 G/DL (ref 31.5–36.5)
MCHC RBC AUTO-ENTMCNC: 34.2 G/DL (ref 31.5–36.5)
MCHC RBC AUTO-ENTMCNC: 34.4 G/DL (ref 31.5–36.5)
MCHC RBC AUTO-ENTMCNC: 34.5 G/DL (ref 31.5–36.5)
MCHC RBC AUTO-ENTMCNC: 34.9 G/DL (ref 31.5–36.5)
MCHC RBC AUTO-ENTMCNC: 35.2 G/DL (ref 31.5–36.5)
MCHC RBC AUTO-ENTMCNC: 35.6 G/DL (ref 31.5–36.5)
MCHC RBC AUTO-ENTMCNC: 36 G/DL (ref 31.5–36.5)
MCV RBC AUTO: 87 FL (ref 78–100)
MCV RBC AUTO: 88 FL (ref 78–100)
MCV RBC AUTO: 88 FL (ref 78–100)
MCV RBC AUTO: 89 FL (ref 78–100)
MCV RBC AUTO: 89 FL (ref 78–100)
MCV RBC AUTO: 91 FL (ref 78–100)
MONOCYTES # BLD AUTO: 0.1 10E9/L (ref 0–1.3)
MONOCYTES # BLD AUTO: 0.1 10E9/L (ref 0–1.3)
MONOCYTES # BLD AUTO: 0.3 10E9/L (ref 0–1.3)
MONOCYTES # BLD AUTO: 0.3 10E9/L (ref 0–1.3)
MONOCYTES # BLD AUTO: 0.5 10E9/L (ref 0–1.3)
MONOCYTES # BLD AUTO: 0.6 10E9/L (ref 0–1.3)
MONOCYTES NFR BLD AUTO: 0.9 %
MONOCYTES NFR BLD AUTO: 0.9 %
MONOCYTES NFR BLD AUTO: 3.4 %
MONOCYTES NFR BLD AUTO: 3.5 %
MONOCYTES NFR BLD AUTO: 5.9 %
MONOCYTES NFR BLD AUTO: 6 %
MONOCYTES NFR BLD AUTO: 6.2 %
MONOCYTES NFR BLD AUTO: 6.8 %
MYELOCYTES # BLD: 0.1 10E9/L
MYELOCYTES NFR BLD MANUAL: 0.8 %
NEUTROPHILS # BLD AUTO: 7 10E9/L (ref 1.6–8.3)
NEUTROPHILS # BLD AUTO: 8.1 10E9/L (ref 1.6–8.3)
NEUTROPHILS # BLD AUTO: 8.6 10E9/L (ref 1.6–8.3)
NEUTROPHILS # BLD AUTO: 8.8 10E9/L (ref 1.6–8.3)
NEUTROPHILS # BLD AUTO: 8.9 10E9/L (ref 1.6–8.3)
NEUTROPHILS # BLD AUTO: 9.1 10E9/L (ref 1.6–8.3)
NEUTROPHILS NFR BLD AUTO: 88.5 %
NEUTROPHILS NFR BLD AUTO: 89.6 %
NEUTROPHILS NFR BLD AUTO: 90.7 %
NEUTROPHILS NFR BLD AUTO: 93.3 %
NEUTROPHILS NFR BLD AUTO: 94.9 %
NEUTROPHILS NFR BLD AUTO: 96.5 %
NEUTROPHILS NFR BLD AUTO: 98.2 %
NEUTROPHILS NFR BLD AUTO: 99.1 %
NRBC # BLD AUTO: 0 10*3/UL
NRBC # BLD AUTO: 0.1 10*3/UL
NRBC BLD AUTO-RTO: 0 /100
NRBC BLD AUTO-RTO: 1 /100
NUM BPU REQUESTED: 1
NUM BPU REQUESTED: 1
NUM BPU REQUESTED: 2
NUM BPU REQUESTED: 5
O2/TOTAL GAS SETTING VFR VENT: 100 %
O2/TOTAL GAS SETTING VFR VENT: 21 %
O2/TOTAL GAS SETTING VFR VENT: 40 %
O2/TOTAL GAS SETTING VFR VENT: 40 %
O2/TOTAL GAS SETTING VFR VENT: 50 %
O2/TOTAL GAS SETTING VFR VENT: 50 %
O2/TOTAL GAS SETTING VFR VENT: 80 %
PCO2 BLD: 23 MM HG (ref 35–45)
PCO2 BLD: 26 MM HG (ref 35–45)
PCO2 BLD: 27 MM HG (ref 35–45)
PCO2 BLD: 30 MM HG (ref 35–45)
PCO2 BLD: 31 MM HG (ref 35–45)
PCO2 BLD: 34 MM HG (ref 35–45)
PCO2 BLD: 40 MM HG (ref 35–45)
PH BLD: 7.38 PH (ref 7.35–7.45)
PH BLD: 7.41 PH (ref 7.35–7.45)
PH BLD: 7.42 PH (ref 7.35–7.45)
PH BLD: 7.44 PH (ref 7.35–7.45)
PH BLD: 7.49 PH (ref 7.35–7.45)
PH BLD: 7.51 PH (ref 7.35–7.45)
PH BLD: 7.53 PH (ref 7.35–7.45)
PHOSPHATE SERPL-MCNC: 3.9 MG/DL (ref 2.5–4.5)
PHOSPHATE SERPL-MCNC: 4.3 MG/DL (ref 2.5–4.5)
PHOSPHATE SERPL-MCNC: 4.4 MG/DL (ref 2.5–4.5)
PHOSPHATE SERPL-MCNC: 4.5 MG/DL (ref 2.5–4.5)
PHOSPHATE SERPL-MCNC: 5.3 MG/DL (ref 2.5–4.5)
PLATELET # BLD AUTO: 105 10E9/L (ref 150–450)
PLATELET # BLD AUTO: 115 10E9/L (ref 150–450)
PLATELET # BLD AUTO: 117 10E9/L (ref 150–450)
PLATELET # BLD AUTO: 70 10E9/L (ref 150–450)
PLATELET # BLD AUTO: 70 10E9/L (ref 150–450)
PLATELET # BLD AUTO: 71 10E9/L (ref 150–450)
PLATELET # BLD AUTO: 73 10E9/L (ref 150–450)
PLATELET # BLD AUTO: 83 10E9/L (ref 150–450)
PLATELET # BLD EST: ABNORMAL 10*3/UL
PO2 BLD: 104 MM HG (ref 80–105)
PO2 BLD: 159 MM HG (ref 80–105)
PO2 BLD: 185 MM HG (ref 80–105)
PO2 BLD: 84 MM HG (ref 80–105)
PO2 BLD: 86 MM HG (ref 80–105)
PO2 BLD: 89 MM HG (ref 80–105)
PO2 BLD: 99 MM HG (ref 80–105)
POIKILOCYTOSIS BLD QL SMEAR: SLIGHT
POLYCHROMASIA BLD QL SMEAR: SLIGHT
POTASSIUM SERPL-SCNC: 4.2 MMOL/L (ref 3.4–5.3)
POTASSIUM SERPL-SCNC: 4.3 MMOL/L (ref 3.4–5.3)
POTASSIUM SERPL-SCNC: 4.3 MMOL/L (ref 3.4–5.3)
POTASSIUM SERPL-SCNC: 4.4 MMOL/L (ref 3.4–5.3)
POTASSIUM SERPL-SCNC: 4.5 MMOL/L (ref 3.4–5.3)
POTASSIUM SERPL-SCNC: 4.5 MMOL/L (ref 3.4–5.3)
POTASSIUM SERPL-SCNC: 4.7 MMOL/L (ref 3.4–5.3)
PROMYELOCYTES # BLD MANUAL: 0.1 10E9/L
PROMYELOCYTES NFR BLD MANUAL: 0.9 %
PROT SERPL-MCNC: 3.4 G/DL (ref 6.8–8.8)
PROT SERPL-MCNC: 3.9 G/DL (ref 6.8–8.8)
PROT SERPL-MCNC: 3.9 G/DL (ref 6.8–8.8)
PROT SERPL-MCNC: 4 G/DL (ref 6.8–8.8)
PROT SERPL-MCNC: 4 G/DL (ref 6.8–8.8)
PROT SERPL-MCNC: 4.1 G/DL (ref 6.8–8.8)
PROT SERPL-MCNC: 4.1 G/DL (ref 6.8–8.8)
R TIME UNTIL CLOT FORMS: 3.9 MINUTE (ref 5–10)
RBC # BLD AUTO: 2.35 10E12/L (ref 3.8–5.2)
RBC # BLD AUTO: 2.61 10E12/L (ref 3.8–5.2)
RBC # BLD AUTO: 2.68 10E12/L (ref 3.8–5.2)
RBC # BLD AUTO: 2.75 10E12/L (ref 3.8–5.2)
RBC # BLD AUTO: 3.1 10E12/L (ref 3.8–5.2)
RBC # BLD AUTO: 3.16 10E12/L (ref 3.8–5.2)
RBC # BLD AUTO: 3.44 10E12/L (ref 3.8–5.2)
RBC # BLD AUTO: 3.93 10E12/L (ref 3.8–5.2)
SODIUM SERPL-SCNC: 137 MMOL/L (ref 133–144)
SODIUM SERPL-SCNC: 138 MMOL/L (ref 133–144)
SODIUM SERPL-SCNC: 139 MMOL/L (ref 133–144)
SODIUM SERPL-SCNC: 140 MMOL/L (ref 133–144)
SODIUM SERPL-SCNC: 141 MMOL/L (ref 133–144)
SPECIMEN SOURCE: NORMAL
TRANSFUSION STATUS PATIENT QL: NORMAL
WBC # BLD AUTO: 7.7 10E9/L (ref 4–11)
WBC # BLD AUTO: 9 10E9/L (ref 4–11)
WBC # BLD AUTO: 9 10E9/L (ref 4–11)
WBC # BLD AUTO: 9.1 10E9/L (ref 4–11)
WBC # BLD AUTO: 9.1 10E9/L (ref 4–11)
WBC # BLD AUTO: 9.2 10E9/L (ref 4–11)
WBC # BLD AUTO: 9.7 10E9/L (ref 4–11)
WBC # BLD AUTO: 9.8 10E9/L (ref 4–11)

## 2019-08-19 PROCEDURE — 25000125 ZZHC RX 250: Performed by: STUDENT IN AN ORGANIZED HEALTH CARE EDUCATION/TRAINING PROGRAM

## 2019-08-19 PROCEDURE — P9041 ALBUMIN (HUMAN),5%, 50ML: HCPCS | Performed by: NURSE PRACTITIONER

## 2019-08-19 PROCEDURE — P9016 RBC LEUKOCYTES REDUCED: HCPCS | Performed by: SURGERY

## 2019-08-19 PROCEDURE — 25000132 ZZH RX MED GY IP 250 OP 250 PS 637: Performed by: SURGERY

## 2019-08-19 PROCEDURE — 25000128 H RX IP 250 OP 636: Performed by: INTERNAL MEDICINE

## 2019-08-19 PROCEDURE — P9041 ALBUMIN (HUMAN),5%, 50ML: HCPCS | Performed by: STUDENT IN AN ORGANIZED HEALTH CARE EDUCATION/TRAINING PROGRAM

## 2019-08-19 PROCEDURE — 25000131 ZZH RX MED GY IP 250 OP 636 PS 637: Performed by: PHYSICIAN ASSISTANT

## 2019-08-19 PROCEDURE — 25000125 ZZHC RX 250: Performed by: GENERAL ACUTE CARE HOSPITAL

## 2019-08-19 PROCEDURE — 82803 BLOOD GASES ANY COMBINATION: CPT | Performed by: STUDENT IN AN ORGANIZED HEALTH CARE EDUCATION/TRAINING PROGRAM

## 2019-08-19 PROCEDURE — 82330 ASSAY OF CALCIUM: CPT | Performed by: NURSE PRACTITIONER

## 2019-08-19 PROCEDURE — P9041 ALBUMIN (HUMAN),5%, 50ML: HCPCS

## 2019-08-19 PROCEDURE — 25000128 H RX IP 250 OP 636: Performed by: STUDENT IN AN ORGANIZED HEALTH CARE EDUCATION/TRAINING PROGRAM

## 2019-08-19 PROCEDURE — 80053 COMPREHEN METABOLIC PANEL: CPT | Performed by: GENERAL ACUTE CARE HOSPITAL

## 2019-08-19 PROCEDURE — 25800025 ZZH RX 258: Performed by: SURGERY

## 2019-08-19 PROCEDURE — 85610 PROTHROMBIN TIME: CPT | Performed by: SURGERY

## 2019-08-19 PROCEDURE — P9037 PLATE PHERES LEUKOREDU IRRAD: HCPCS | Performed by: STUDENT IN AN ORGANIZED HEALTH CARE EDUCATION/TRAINING PROGRAM

## 2019-08-19 PROCEDURE — 85384 FIBRINOGEN ACTIVITY: CPT | Performed by: SURGERY

## 2019-08-19 PROCEDURE — 82803 BLOOD GASES ANY COMBINATION: CPT | Performed by: SURGERY

## 2019-08-19 PROCEDURE — 90947 DIALYSIS REPEATED EVAL: CPT

## 2019-08-19 PROCEDURE — 82330 ASSAY OF CALCIUM: CPT | Performed by: SURGERY

## 2019-08-19 PROCEDURE — 40000014 ZZH STATISTIC ARTERIAL MONITORING DAILY

## 2019-08-19 PROCEDURE — 85025 COMPLETE CBC W/AUTO DIFF WBC: CPT | Performed by: SURGERY

## 2019-08-19 PROCEDURE — 82330 ASSAY OF CALCIUM: CPT | Performed by: GENERAL ACUTE CARE HOSPITAL

## 2019-08-19 PROCEDURE — 84100 ASSAY OF PHOSPHORUS: CPT | Performed by: GENERAL ACUTE CARE HOSPITAL

## 2019-08-19 PROCEDURE — 25000128 H RX IP 250 OP 636

## 2019-08-19 PROCEDURE — 25000132 ZZH RX MED GY IP 250 OP 250 PS 637: Performed by: STUDENT IN AN ORGANIZED HEALTH CARE EDUCATION/TRAINING PROGRAM

## 2019-08-19 PROCEDURE — 93975 VASCULAR STUDY: CPT | Mod: TC

## 2019-08-19 PROCEDURE — 82150 ASSAY OF AMYLASE: CPT | Performed by: GENERAL ACUTE CARE HOSPITAL

## 2019-08-19 PROCEDURE — 40000196 ZZH STATISTIC RAPCV CVP MONITORING

## 2019-08-19 PROCEDURE — 83605 ASSAY OF LACTIC ACID: CPT | Performed by: SURGERY

## 2019-08-19 PROCEDURE — 40000048 ZZH STATISTIC DAILY SWAN MONITORING

## 2019-08-19 PROCEDURE — 80053 COMPREHEN METABOLIC PANEL: CPT | Performed by: SURGERY

## 2019-08-19 PROCEDURE — P9037 PLATE PHERES LEUKOREDU IRRAD: HCPCS | Performed by: SURGERY

## 2019-08-19 PROCEDURE — 25000128 H RX IP 250 OP 636: Performed by: SURGERY

## 2019-08-19 PROCEDURE — 25800030 ZZH RX IP 258 OP 636: Performed by: INTERNAL MEDICINE

## 2019-08-19 PROCEDURE — 40000275 ZZH STATISTIC RCP TIME EA 10 MIN

## 2019-08-19 PROCEDURE — 83735 ASSAY OF MAGNESIUM: CPT | Performed by: SURGERY

## 2019-08-19 PROCEDURE — 83605 ASSAY OF LACTIC ACID: CPT | Performed by: NURSE PRACTITIONER

## 2019-08-19 PROCEDURE — 85396 CLOTTING ASSAY WHOLE BLOOD: CPT | Performed by: STUDENT IN AN ORGANIZED HEALTH CARE EDUCATION/TRAINING PROGRAM

## 2019-08-19 PROCEDURE — 83690 ASSAY OF LIPASE: CPT | Performed by: GENERAL ACUTE CARE HOSPITAL

## 2019-08-19 PROCEDURE — 25000128 H RX IP 250 OP 636: Performed by: NURSE PRACTITIONER

## 2019-08-19 PROCEDURE — 85610 PROTHROMBIN TIME: CPT | Performed by: GENERAL ACUTE CARE HOSPITAL

## 2019-08-19 PROCEDURE — 00000146 ZZHCL STATISTIC GLUCOSE BY METER IP

## 2019-08-19 PROCEDURE — 40000344 ZZHCL STATISTIC THAWING COMPONENT: Performed by: NURSE PRACTITIONER

## 2019-08-19 PROCEDURE — 84100 ASSAY OF PHOSPHORUS: CPT | Performed by: SURGERY

## 2019-08-19 PROCEDURE — 85025 COMPLETE CBC W/AUTO DIFF WBC: CPT | Performed by: GENERAL ACUTE CARE HOSPITAL

## 2019-08-19 PROCEDURE — 94003 VENT MGMT INPAT SUBQ DAY: CPT

## 2019-08-19 PROCEDURE — 25000132 ZZH RX MED GY IP 250 OP 250 PS 637: Performed by: PHYSICIAN ASSISTANT

## 2019-08-19 PROCEDURE — 85384 FIBRINOGEN ACTIVITY: CPT | Performed by: GENERAL ACUTE CARE HOSPITAL

## 2019-08-19 PROCEDURE — 25800030 ZZH RX IP 258 OP 636: Performed by: SURGERY

## 2019-08-19 PROCEDURE — 82803 BLOOD GASES ANY COMBINATION: CPT | Performed by: NURSE PRACTITIONER

## 2019-08-19 PROCEDURE — 83735 ASSAY OF MAGNESIUM: CPT | Performed by: GENERAL ACUTE CARE HOSPITAL

## 2019-08-19 PROCEDURE — 12000001 ZZH R&B MED SURG/OB UMMC

## 2019-08-19 PROCEDURE — 27210429 ZZH NUTRITION PRODUCT INTERMEDIATE LITER

## 2019-08-19 PROCEDURE — 25000131 ZZH RX MED GY IP 250 OP 636 PS 637: Performed by: SURGERY

## 2019-08-19 PROCEDURE — 99291 CRITICAL CARE FIRST HOUR: CPT | Mod: GC | Performed by: SURGERY

## 2019-08-19 PROCEDURE — 83605 ASSAY OF LACTIC ACID: CPT | Performed by: GENERAL ACUTE CARE HOSPITAL

## 2019-08-19 PROCEDURE — P9012 CRYOPRECIPITATE EACH UNIT: HCPCS | Performed by: NURSE PRACTITIONER

## 2019-08-19 PROCEDURE — 82248 BILIRUBIN DIRECT: CPT | Performed by: GENERAL ACUTE CARE HOSPITAL

## 2019-08-19 RX ORDER — MYCOPHENOLATE MOFETIL 250 MG/1
750 CAPSULE ORAL
Status: DISCONTINUED | OUTPATIENT
Start: 2019-08-19 | End: 2019-09-02

## 2019-08-19 RX ORDER — ALBUMIN, HUMAN INJ 5% 5 %
25 SOLUTION INTRAVENOUS ONCE
Status: COMPLETED | OUTPATIENT
Start: 2019-08-19 | End: 2019-08-19

## 2019-08-19 RX ORDER — MYCOPHENOLATE MOFETIL 200 MG/ML
750 POWDER, FOR SUSPENSION ORAL
Status: DISCONTINUED | OUTPATIENT
Start: 2019-08-19 | End: 2019-09-02

## 2019-08-19 RX ORDER — ALBUMIN, HUMAN INJ 5% 5 %
SOLUTION INTRAVENOUS
Status: DISCONTINUED
Start: 2019-08-19 | End: 2019-08-19 | Stop reason: HOSPADM

## 2019-08-19 RX ORDER — AMINO AC/PROTEIN HYDR/WHEY PRO 10G-100/30
1 LIQUID (ML) ORAL 3 TIMES DAILY
Status: DISCONTINUED | OUTPATIENT
Start: 2019-08-19 | End: 2019-08-28

## 2019-08-19 RX ORDER — ALBUMIN, HUMAN INJ 5% 5 %
SOLUTION INTRAVENOUS
Status: COMPLETED
Start: 2019-08-19 | End: 2019-08-19

## 2019-08-19 RX ORDER — ASPIRIN 325 MG
325 TABLET ORAL DAILY
Status: DISCONTINUED | OUTPATIENT
Start: 2019-08-19 | End: 2019-09-23 | Stop reason: HOSPADM

## 2019-08-19 RX ORDER — OXYCODONE HYDROCHLORIDE 5 MG/1
5 TABLET ORAL EVERY 4 HOURS PRN
Status: DISCONTINUED | OUTPATIENT
Start: 2019-08-19 | End: 2019-08-30

## 2019-08-19 RX ORDER — ALBUMIN, HUMAN INJ 5% 5 %
12.5 SOLUTION INTRAVENOUS ONCE
Status: COMPLETED | OUTPATIENT
Start: 2019-08-19 | End: 2019-08-19

## 2019-08-19 RX ORDER — DESMOPRESSIN ACETATE 4 UG/ML
2 INJECTION, SOLUTION INTRAVENOUS; SUBCUTANEOUS ONCE
Status: COMPLETED | OUTPATIENT
Start: 2019-08-19 | End: 2019-08-19

## 2019-08-19 RX ORDER — HEPARIN SODIUM 10000 [USP'U]/100ML
400 INJECTION, SOLUTION INTRAVENOUS CONTINUOUS
Status: DISCONTINUED | OUTPATIENT
Start: 2019-08-19 | End: 2019-08-19

## 2019-08-19 RX ORDER — HYDROMORPHONE HYDROCHLORIDE 1 MG/ML
.3-.5 INJECTION, SOLUTION INTRAMUSCULAR; INTRAVENOUS; SUBCUTANEOUS
Status: DISCONTINUED | OUTPATIENT
Start: 2019-08-19 | End: 2019-08-25

## 2019-08-19 RX ORDER — CARBOXYMETHYLCELLULOSE SODIUM 5 MG/ML
2 SOLUTION/ DROPS OPHTHALMIC EVERY 6 HOURS
Status: DISCONTINUED | OUTPATIENT
Start: 2019-08-19 | End: 2019-09-17

## 2019-08-19 RX ADMIN — CALCIUM CHLORIDE, MAGNESIUM CHLORIDE, SODIUM CHLORIDE, SODIUM BICARBONATE, POTASSIUM CHLORIDE AND SODIUM PHOSPHATE DIBASIC DIHYDRATE 12.5 ML/KG/HR: 3.68; 3.05; 6.34; 3.09; .314; .187 INJECTION INTRAVENOUS at 20:18

## 2019-08-19 RX ADMIN — CALCIUM CHLORIDE, MAGNESIUM CHLORIDE, SODIUM CHLORIDE, SODIUM BICARBONATE, POTASSIUM CHLORIDE AND SODIUM PHOSPHATE DIBASIC DIHYDRATE 12.5 ML/KG/HR: 3.68; 3.05; 6.34; 3.09; .314; .187 INJECTION INTRAVENOUS at 08:53

## 2019-08-19 RX ADMIN — FAMOTIDINE 20 MG: 20 TABLET ORAL at 08:15

## 2019-08-19 RX ADMIN — CALCIUM CHLORIDE, MAGNESIUM CHLORIDE, SODIUM CHLORIDE, SODIUM BICARBONATE, POTASSIUM CHLORIDE AND SODIUM PHOSPHATE DIBASIC DIHYDRATE 12.5 ML/KG/HR: 3.68; 3.05; 6.34; 3.09; .314; .187 INJECTION INTRAVENOUS at 04:59

## 2019-08-19 RX ADMIN — MULTIVITAMIN 15 ML: LIQUID ORAL at 12:24

## 2019-08-19 RX ADMIN — MYCOPHENOLATE MOFETIL 750 MG: 200 POWDER, FOR SUSPENSION ORAL at 17:56

## 2019-08-19 RX ADMIN — SALINE NASAL SPRAY 2 SPRAY: 1.5 SOLUTION NASAL at 20:14

## 2019-08-19 RX ADMIN — CALCIUM CHLORIDE, MAGNESIUM CHLORIDE, SODIUM CHLORIDE, SODIUM BICARBONATE, POTASSIUM CHLORIDE AND SODIUM PHOSPHATE DIBASIC DIHYDRATE 12.5 ML/KG/HR: 3.68; 3.05; 6.34; 3.09; .314; .187 INJECTION INTRAVENOUS at 16:20

## 2019-08-19 RX ADMIN — DEXTROSE AND SODIUM CHLORIDE: 5; 450 INJECTION, SOLUTION INTRAVENOUS at 17:11

## 2019-08-19 RX ADMIN — PROPOFOL 10 MCG/KG/MIN: 10 INJECTION, EMULSION INTRAVENOUS at 20:02

## 2019-08-19 RX ADMIN — Medication 2 DROP: at 08:14

## 2019-08-19 RX ADMIN — CALCIUM CHLORIDE, MAGNESIUM CHLORIDE, SODIUM CHLORIDE, SODIUM BICARBONATE, POTASSIUM CHLORIDE AND SODIUM PHOSPHATE DIBASIC DIHYDRATE 12.5 ML/KG/HR: 3.68; 3.05; 6.34; 3.09; .314; .187 INJECTION INTRAVENOUS at 12:42

## 2019-08-19 RX ADMIN — PROPOFOL 30 MCG/KG/MIN: 10 INJECTION, EMULSION INTRAVENOUS at 14:23

## 2019-08-19 RX ADMIN — ALBUMIN HUMAN 25 G: 0.05 INJECTION, SOLUTION INTRAVENOUS at 09:08

## 2019-08-19 RX ADMIN — PIPERACILLIN SODIUM,TAZOBACTAM SODIUM 4.5 G: 4; .5 INJECTION, POWDER, FOR SOLUTION INTRAVENOUS at 08:11

## 2019-08-19 RX ADMIN — ASPIRIN 325 MG ORAL TABLET 325 MG: 325 PILL ORAL at 12:24

## 2019-08-19 RX ADMIN — Medication 0.05 MCG/KG/MIN: at 15:54

## 2019-08-19 RX ADMIN — PIPERACILLIN SODIUM,TAZOBACTAM SODIUM 4.5 G: 4; .5 INJECTION, POWDER, FOR SOLUTION INTRAVENOUS at 01:31

## 2019-08-19 RX ADMIN — Medication 2 DROP: at 23:21

## 2019-08-19 RX ADMIN — Medication 1 PACKET: at 20:15

## 2019-08-19 RX ADMIN — PROPOFOL 30 MCG/KG/MIN: 10 INJECTION, EMULSION INTRAVENOUS at 03:26

## 2019-08-19 RX ADMIN — HUMAN INSULIN 2 UNITS/HR: 100 INJECTION, SOLUTION SUBCUTANEOUS at 13:16

## 2019-08-19 RX ADMIN — DESMOPRESSIN ACETATE 2 MCG: 4 INJECTION INTRAVENOUS at 04:38

## 2019-08-19 RX ADMIN — VALGANCICLOVIR HYDROCHLORIDE 450 MG: 50 POWDER, FOR SOLUTION ORAL at 08:18

## 2019-08-19 RX ADMIN — Medication 1 MG: at 17:57

## 2019-08-19 RX ADMIN — MICAFUNGIN SODIUM 100 MG: 10 INJECTION, POWDER, LYOPHILIZED, FOR SOLUTION INTRAVENOUS at 12:43

## 2019-08-19 RX ADMIN — PIPERACILLIN SODIUM,TAZOBACTAM SODIUM 4.5 G: 4; .5 INJECTION, POWDER, FOR SOLUTION INTRAVENOUS at 19:58

## 2019-08-19 RX ADMIN — SODIUM CHLORIDE 200 MG: 9 INJECTION, SOLUTION INTRAVENOUS at 08:21

## 2019-08-19 RX ADMIN — LEVOTHYROXINE SODIUM 125 MCG: 0.12 TABLET ORAL at 08:15

## 2019-08-19 RX ADMIN — SODIUM CHLORIDE 20 MG: 9 INJECTION, SOLUTION INTRAVENOUS at 09:06

## 2019-08-19 RX ADMIN — SALINE NASAL SPRAY 2 SPRAY: 1.5 SOLUTION NASAL at 10:19

## 2019-08-19 RX ADMIN — MELATONIN 1000 UNITS: at 08:15

## 2019-08-19 RX ADMIN — Medication 2 DROP: at 01:31

## 2019-08-19 RX ADMIN — PROPOFOL 30 MCG/KG/MIN: 10 INJECTION, EMULSION INTRAVENOUS at 08:42

## 2019-08-19 RX ADMIN — CALCIUM CHLORIDE, MAGNESIUM CHLORIDE, SODIUM CHLORIDE, SODIUM BICARBONATE, POTASSIUM CHLORIDE AND SODIUM PHOSPHATE DIBASIC DIHYDRATE 1.89 ML/KG/HR: 3.68; 3.05; 6.34; 3.09; .314; .187 INJECTION INTRAVENOUS at 00:56

## 2019-08-19 RX ADMIN — Medication 2 DROP: at 12:25

## 2019-08-19 RX ADMIN — Medication 1 PACKET: at 14:29

## 2019-08-19 RX ADMIN — ALBUMIN HUMAN 12.5 G: 0.05 INJECTION, SOLUTION INTRAVENOUS at 03:14

## 2019-08-19 RX ADMIN — VANCOMYCIN HYDROCHLORIDE 2000 MG: 10 INJECTION, POWDER, LYOPHILIZED, FOR SOLUTION INTRAVENOUS at 08:20

## 2019-08-19 RX ADMIN — MYCOPHENOLATE MOFETIL 1000 MG: 200 POWDER, FOR SUSPENSION ORAL at 08:17

## 2019-08-19 RX ADMIN — Medication 2 DROP: at 17:55

## 2019-08-19 RX ADMIN — CALCIUM CHLORIDE, MAGNESIUM CHLORIDE, SODIUM CHLORIDE, SODIUM BICARBONATE, POTASSIUM CHLORIDE AND SODIUM PHOSPHATE DIBASIC DIHYDRATE 12.5 ML/KG/HR: 3.68; 3.05; 6.34; 3.09; .314; .187 INJECTION INTRAVENOUS at 16:21

## 2019-08-19 RX ADMIN — ALBUMIN HUMAN 12.5 G: 0.05 INJECTION, SOLUTION INTRAVENOUS at 02:11

## 2019-08-19 RX ADMIN — FAMOTIDINE 20 MG: 20 TABLET ORAL at 20:15

## 2019-08-19 RX ADMIN — CALCIUM CHLORIDE, MAGNESIUM CHLORIDE, SODIUM CHLORIDE, SODIUM BICARBONATE, POTASSIUM CHLORIDE AND SODIUM PHOSPHATE DIBASIC DIHYDRATE 12.5 ML/KG/HR: 3.68; 3.05; 6.34; 3.09; .314; .187 INJECTION INTRAVENOUS at 00:56

## 2019-08-19 RX ADMIN — MELATONIN 1000 UNITS: at 20:14

## 2019-08-19 RX ADMIN — PIPERACILLIN SODIUM,TAZOBACTAM SODIUM 4.5 G: 4; .5 INJECTION, POWDER, FOR SOLUTION INTRAVENOUS at 13:32

## 2019-08-19 RX ADMIN — SALINE NASAL SPRAY 2 SPRAY: 1.5 SOLUTION NASAL at 14:27

## 2019-08-19 RX ADMIN — CALCIUM CHLORIDE, MAGNESIUM CHLORIDE, SODIUM CHLORIDE, SODIUM BICARBONATE, POTASSIUM CHLORIDE AND SODIUM PHOSPHATE DIBASIC DIHYDRATE 12.5 ML/KG/HR: 3.68; 3.05; 6.34; 3.09; .314; .187 INJECTION INTRAVENOUS at 00:55

## 2019-08-19 ASSESSMENT — ACTIVITIES OF DAILY LIVING (ADL)
ADLS_ACUITY_SCORE: 24
ADLS_ACUITY_SCORE: 24
ADLS_ACUITY_SCORE: 26
ADLS_ACUITY_SCORE: 24
ADLS_ACUITY_SCORE: 26
ADLS_ACUITY_SCORE: 24

## 2019-08-19 NOTE — ANESTHESIA POSTPROCEDURE EVALUATION
Anesthesia POST Procedure Evaluation    Patient: Nicci Pemberton   MRN:     [id number] Gender:   female   Age:    59 year old :      1960        Preoperative Diagnosis: End Stage Liver Disease   Procedure(s):  TRANSPLANT, LIVER, RECIPIENT,  DONOR  BACKBENCH PREPARATION, LIVER   Postop Comments: No value filed.       Anesthesia Type:  Not documented  General    Reportable Event: NO     PAIN:        Neuro/Psych: Uneventful perioperative course   Sign Out Status: Planned Postop Sedation     Airway/Resp.: Uneventful perioperative course   Sign Out Status: Airway Device resent     Airway Device: ETT                 Reason: Planned (pre-op)     CV: Uneventful perioperative course   Sign Out status: CV Support within EXPECTED Parameters     Disposition:   Sign Out in:  ICU  Disposition:  ICU  Recovery Course: Recovery in ICU  Follow-Up: Not required           Last Anesthesia Record Vitals:  CRNA VITALS  2019 - 2019             Resp Rate (observed):  19    Resp Rate (set):  19          Last PACU Vitals:  No vitals data found for the desired time range.        Electronically Signed By: Adi Aguilera MD, 2019, 8:42 PM

## 2019-08-19 NOTE — CONSULTS
Nephrology Initial Consult  August 18, 2019      Nicci Pemberton MRN:[id number] YOB: 1960  Date of Admission:8/16/2019  Primary care provider: Wale Thurston  Requesting physician: Sulma Cat,*    Today Recommendations:  - CRRT during the operation.     ASSESSMENT AND RECOMMENDATIONS:   Mrs. 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 Plan for liver transplant surgery today.     # Non-Oliguric GAMAL:  - Crea baseline: 0.6 mg/dl,   - On admission: 2.3 mg/dl  - Today: 2.1 mg/dl  - GAMAL Etiology: Likely due to hepatorenal syndrome, other etiology due to pre-renal azotemia/decrease intravascular volume vs generalized edema causing renal function impairment vs tubular bilirubinopathy vs abdominal compartment syndrome compression on renal artery causes decrease renal blood flow.   - Patient still have good urine output with almost stable Crea since admission, plan for liver transplant surgery today, so I consented the patient for CRRT that will be initiated during the operation, anticipate that patient will need to continue with CRRT post surgery and will adjust volume status order accordingly.      # Blood pressure, volume status:  - BP is low normal.   - Patient's volume is significantly elevated with +3 edema to lower extrs.   - Plan to start with CRRT during the surgery and to follow up.     # Electrolytes, Acid-Base:  - Mild hyponatremia due to liver cirrhosis.   - K: WNL  - BiCarb: low normal.  - Will be on CRRT.     # Anemia:  - Hgb this morning 7.4 post transfusion.   - Transfusion by primary team.     # ESLD secondary to ANGULO plan for liver transplant surgery today:  - Plan for liver transplant today  - Managed  by hepatology and liver transplant teams.     Recommendations were communicated to primary team via note and call.     Seen and discussed with Dr. Isa Mack MD   Nephrology Fellow  [phone]    REASON FOR CONSULT: GAMAL, CRRT initiation.    HISTORY OF PRESENT ILLNESS:  Admitting provider and nursing notes reviewed  Mrs. 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 Plan for liver transplant surgery today. Patient was seen and examined at bedside this morning, I personally consented the patient for renal replacement therapy including CRRT and intermittent hemodialysis. Patient states she feels fatigue and tired but she is happy waiting for the liver transplant surgery. Patient was able to answer all my question correctly, she denies any chest pain, shortness of breath, nausea, vomiting or fever.      PAST MEDICAL HISTORY:  Reviewed with patient on 08/18/2019   At bedside  Past Medical History:   Diagnosis Date     Anemia      Cellulitis      Cirrhosis of liver (H) 6/18/2018     Heterozygous alpha 1-antitrypsin deficiency (H)      High hepatic iron concentration determined by biopsy of liver      Liver cirrhosis secondary to ANGULO (H)      Lymphedema      Osteoarthritis      SBP (spontaneous bacterial peritonitis) (H) 8/2/2019 8/1/19 in [facility]       Past Surgical History:   Procedure Laterality Date     COLONOSCOPY N/A 6/22/2018    Procedure: COLONOSCOPY;  colonoscopy;  Surgeon: Hugo Patel MD;  Location:  OR        MEDICATIONS:  PTA Meds  Prior to Admission medications    Medication Sig Last Dose Taking? Auth Provider   bumetanide (BUMEX) 1 MG tablet Take 1 tablet (1 mg) by mouth daily 8/16/2019 at Unknown time Yes  Leventhal, Thomas Michael, MD   Cholecalciferol (VITAMIN D-3) 1000 units CAPS Take 1,000 Units by mouth 2 times daily 8/16/2019x1 at Unknown time Yes Reported, Patient   ciprofloxacin (CIPRO) 500 MG tablet Take 500 mg by mouth daily  8/16/2019x1 at Unknown time Yes Unknown, Entered By History   famotidine (PEPCID) 20 MG tablet Take 20 mg by mouth 2 times daily  8/16/2019x1 at Unknown time Yes Reported, Patient   lactulose 20 GM/30ML SOLN Take 30 mLs by mouth 3 times daily 8/16/2019x1 at Unknown time Yes Unknown, Entered By History   levothyroxine (SYNTHROID/LEVOTHROID) 125 MCG tablet Take 125 mcg by mouth daily 8/16/2019 at Unknown time Yes Unknown, Entered By History   magnesium oxide (MAG-OX) 400 MG tablet Take 400 mg by mouth daily  8/16/2019 at Unknown time Yes Reported, Patient   oxyCODONE HCl (OXAYDO) 5 MG TABA Take 5 mg by mouth every 8 hours as needed Past Month at Unknown time Yes Unknown, Entered By History   rifaximin (XIFAXAN) 550 MG TABS tablet Take 1 tablet (550 mg) by mouth 2 times daily 8/16/2019x1 at Unknown time Yes Leventhal, Thomas Michael, MD   spironolactone (ALDACTONE) 100 MG tablet Take 100 mg by mouth 2 times daily 8/16/2019x1 at Unknown time Yes Unknown, Entered By History      Current Meds    [Auto Hold] basiliximab (SIMULECT) infusion  20 mg Intravenous Once     [Auto Hold] famotidine  20 mg Oral BID     [Auto Hold] levothyroxine  125 mcg Oral Daily     [Auto Hold] rifaximin  550 mg Oral BID     sodium chloride (PF)  3 mL Intracatheter Q8H     [Auto Hold] sodium chloride (PF)  3 mL Intracatheter Q8H     [Auto Hold] vitamin D3  1,000 Units Oral BID     Infusion Meds    aminocaproic acid (AMICAR) infusion ADULT       bacitracin, gentamicin, polymyxin in 0.9% NaCl       bacitracin, gentamicin, polymyxin in 0.9% NaCl       CRRT replacement solution 12.5 mL/kg/hr (08/18/19 1129)     EPINEPHrine IV infusion ADULT Stopped (08/18/19 7807)     - MEDICATION INSTRUCTIONS -       CRRT replacement  solution 200 mL/hr at 19 1129     CRRT replacement solution 12.5 mL/kg/hr (19 1129)     vasopressin (PITRESSIN) infusion ADULT (40 mL)         ALLERGIES:    Allergies   Allergen Reactions     Food      Fruits with cores and pits     Sulfa Drugs Unknown     Swollen joints     Furosemide Rash       REVIEW OF SYSTEMS:  A comprehensive of systems was negative except as noted above.    SOCIAL HISTORY:   Social History     Socioeconomic History     Marital status:      Spouse name: Not on file     Number of children: Not on file     Years of education: Not on file     Highest education level: Not on file   Occupational History     Not on file   Social Needs     Financial resource strain: Not on file     Food insecurity:     Worry: Not on file     Inability: Not on file     Transportation needs:     Medical: Not on file     Non-medical: Not on file   Tobacco Use     Smoking status: Former Smoker     Last attempt to quit:      Years since quittin.6     Smokeless tobacco: Never Used     Tobacco comment: weekend smoker    Substance and Sexual Activity     Alcohol use: No     Drug use: No     Sexual activity: Not on file   Lifestyle     Physical activity:     Days per week: Not on file     Minutes per session: Not on file     Stress: Not on file   Relationships     Social connections:     Talks on phone: Not on file     Gets together: Not on file     Attends [employer] service: Not on file     Active member of club or organization: Not on file     Attends meetings of clubs or organizations: Not on file     Relationship status: Not on file     Intimate partner violence:     Fear of current or ex partner: Not on file     Emotionally abused: Not on file     Physically abused: Not on file     Forced sexual activity: Not on file   Other Topics Concern     Parent/sibling w/ CABG, MI or angioplasty before 65F 55M? Not Asked   Social History Narrative     Not on file     Reviewed with patient at bedside  Her  family accompanies Nicci Pemberton in hospital room    FAMILY MEDICAL HISTORY:   Family History   Problem Relation Age of Onset     Cirrhosis No family hx of      Liver Cancer No family hx of      Reviewed with patient at bedside    PHYSICAL EXAM:   Temp  Av.3  F (36.3  C)  Min: 95.9  F (35.5  C)  Max: 98.1  F (36.7  C)      Pulse  Av.7  Min: 71  Max: 85 Resp  Av.6  Min: 12  Max: 20  SpO2  Av.4 %  Min: 91 %  Max: 97 %       /50   Pulse 78   Temp 97.9  F (36.6  C) (Oral)   Resp 16   Wt 105.7 kg (233 lb)   SpO2 93%   BMI 42.62 kg/m     Date 19 07 - 19 0659   Shift 3181-0604 4663-5114 1150-3315 24 Hour Total   INTAKE   Other 600 3000  3600   Colloid 2000 1000  3000   Platelets 2534 1065  3599   Red Blood Cells 2100 600  2700   Plasma 1681 1879  3560   Cryoprecipitate 108 310  418   Blood Components 220   220   Shift Total(mL/kg) 9243(87.46) 7854(74.31)  19051(161.77)   OUTPUT   Urine 350 210  560   Other 158 2719  2877   Blood 300   300   Shift Total(mL/kg) 808(7.65) 2929(27.71)  3737(35.36)   Weight (kg) 105.69 105.69 105.69 105.69      Admit Weight: 104.3 kg (230 lb)     GENERAL APPEARANCE: Lethargic, no distress, alert, awake  EYES: positive scleral icterus, pupils equal  Endo: no goiter, no moon facies  Lymphatics: no cervical or supraclavicular LAD  Pulmonary: lungs clear to auscultation with equal breath sounds bilaterally, no clubbing  CV: regular rhythm, normal rate, no rub   - JVD Not elevated   - Edema +3  GI: Distended, ascitic fluid, nontender, normal bowel sounds  MS: no evidence of inflammation in joints, no muscle tenderness  : No durbin  SKIN: Jaundice.   NEURO: face symmetric, No asterixis     LABS:    I personally reviewed her labs.      CMP  Recent Labs   Lab 19  1758 19  1710 19  1700 19  1605  19  0907 19  2341 19  0649 19  1119   * 132* 134 131*   < > 129* 129* 130* 126*   POTASSIUM 4.0 3.9 3.6 4.0    < > 4.6 4.5 4.8 5.0   CHLORIDE  --   --   --   --   --  98 97 97 94   CO2  --   --   --   --   --  22 23 25 25   ANIONGAP  --   --   --   --   --  9 10 9 7   * 161* 171* 191*   < > 68* 91 64* 82   BUN  --   --   --   --   --  46* 45* 46* 40*   CR  --   --   --   --   --  2.16* 2.04* 1.96* 2.30*   GFRESTIMATED  --   --   --   --   --  24* 26* 27* 22*   GFRESTBLACK  --   --   --   --   --  28* 30* 32* 26*   JACOB  --   --   --   --   --  8.7 8.4* 8.4* 8.2*   MAG  --   --   --   --   --   --  2.5*  --   --    PHOS  --   --   --   --   --   --  3.8  --   --    PROTTOTAL  --   --   --   --   --  5.4* 5.2* 4.8* 5.4*   ALBUMIN  --   --   --   --   --  3.3* 3.4 2.7* 1.6*   BILITOTAL  --   --   --   --   --  27.0* 26.9* 24.6* 24.2*   ALKPHOS  --   --   --   --   --  86 87 92 152*   AST  --   --   --   --   --  68* 75* 76* Canceled, Test credited   ALT  --   --   --   --   --  29 34 34 51*    < > = values in this interval not displayed.     CBC  Recent Labs   Lab 08/18/19  1758 08/18/19  1710 08/18/19  1700 08/18/19  1605  08/18/19  0907 08/17/19  2341 08/17/19  0858 08/17/19  0649   HGB 9.5* 9.7* 7.4* 9.5*   < > 7.4* 6.9* 7.6* 7.1*   WBC  --   --   --   --   --  5.0 4.8 5.2 4.6   RBC  --   --   --   --   --  1.98* 1.77* 1.97* 1.85*   HCT  --   --   --   --   --  23.0* 21.3* 23.0* 22.2*   MCV  --   --   --   --   --  116* 120* 117* 120*   MCH  --   --   --   --   --  37.4* 39.0* 38.6* 38.4*   MCHC  --   --   --   --   --  32.2 32.4 33.0 32.0   RDW  --   --   --   --   --  18.1* 15.2* 15.0 15.0   *  --  146* 103*  --  29* 32* 39* 30*    < > = values in this interval not displayed.     INR  Recent Labs   Lab 08/18/19 1758 08/18/19  1700 08/18/19  1605 08/18/19  0907 08/17/19  2341   INR 2.79* 2.12* 3.38* 2.52* 3.64*   *  --  181*  --  66*     ABG  Recent Labs   Lab 08/18/19 1758 08/18/19  1710 08/18/19  1700 08/18/19  1605   PH 7.39 7.39 7.37 7.32*   PCO2 29* 30* 33* 39   PO2 97 84 69* 76*   HCO3 17* 18*  19* 20*   O2PER 53 33 32 34      URINE STUDIES  Recent Labs   Lab Test 08/16/19  1755 06/18/18  1019   COLOR Dark Yellow Yellow   APPEARANCE Clear Clear   URINEGLC Negative Negative   URINEBILI Moderate* Negative   URINEKETONE Negative Negative   SG 1.007 1.014   UBLD Negative Negative   URINEPH 5.5 8.0*   PROTEIN Negative Negative   NITRITE Negative Negative   LEUKEST Negative Small*   RBCU 0 1   WBCU 1 8*     Recent Labs   Lab Test 06/18/18  1019   UTPG Unable to calculate due to low value     PTH  Recent Labs   Lab Test 06/18/18  1024   PTHI 14*     IRON STUDIES  Recent Labs   Lab Test 02/19/19  0825 06/18/18  1024   IRON 188* 220*   * 244   IRONSAT 95* 90*   DREW 825* 996*       IMAGING:  All imaging studies reviewed by me.     Mustapha Mack MD  Nephrology Fellow  Pager: [phone]

## 2019-08-19 NOTE — PROGRESS NOTES
Otolaryngology Progress Note  August 19, 2019    S: No bleeding from the nose or mouth overnight per RN.     O:  /65 (BP Location: Right arm)   Pulse 78   Temp 95.9  F (35.5  C) (Pulmonary Artery)   Resp 20   Wt 108.4 kg (238 lb 15.7 oz)   SpO2 99%   BMI 43.71 kg/m    General: laying supine, sedated  HEENT: Orotracheally intubated. Strip gauze packing in left nasal cavity saturated with old blood, no active bleeding. Floseal in right nasal cavity, no active bleeding. Kerlix gauze in oral cavity saturated with old dried blood, no active bleeding  Respiratory: mechanically ventilated    ROUTINE IP LABS (Last four results)  BMP  Recent Labs   Lab 08/19/19  0600 08/19/19  0330 08/19/19  0000 08/18/19  2100    137 139 138   POTASSIUM 4.3 4.2 4.3 4.2   CHLORIDE 104 105 103 104   JACOB 9.3 9.3 9.9 10.0   CO2 20 18* 16* 17*   BUN 26 27 30 28   CR 1.29* 1.33* 1.53* 1.31*   * 173* 211* 169*     CBC  Recent Labs   Lab 08/19/19  0748 08/19/19  0600 08/19/19  0330 08/19/19  0000   WBC PENDING 9.2 9.7 9.8   RBC 3.10* 3.16* 3.44* 3.93   HGB 9.5* 9.8* 10.5* 12.0   HCT 27.5* 27.5* 30.1* 35.6   MCV 89 87 88 91   MCH 30.6 31.0 30.5 30.5   MCHC 34.5 35.6 34.9 33.7   RDW 16.7* 16.8* 16.6* 16.5*   * 117* 83* 115*     INR  Recent Labs   Lab 08/19/19  0330 08/19/19  0000 08/18/19  2100 08/18/19  1903   INR 1.89* 1.81* 1.88* 2.26*     Assessment and Plan  Nicci Pemberton is a 59 year old female with a past medical history of ESLD 2/2 ANGULO c/b h/o HE and SBP, HTN, A1AT and iron overload, hypothyroidism who developed diffuse nasal and oropharyngeal bleeding after intubation and NG placement in OR 8/18 for liver transplantation. Unable to identify a specific source of her bleeding. Her coagulopathy related to her liver disease is the primary  of her hemorrhage, there were no identifiable sources of bleeding that are amenable to direct intervention. Her left nasal passage was packed with strip gauze and  oropharynx was packed with Afrin soaked kerlix, these will need to be removed prior to extubation. Floseal placed in right nasal passage, this will dissolve on its own.     - recommend correction of coagulopathy as able, much improved today  - please keep patient intubated, ENT will plan to remove oral packing tomorrow and nasal packing Wednesday if no further bleeding  - Keep oral kerlix gauze packing moist with saline Q6H and PRN   - apply afrin PRN to nasal and oral packing for bleeding  - do not remove packing, but OK to place additional dressings over top for ongoing oozing as needed to collect drainage  - recommend keflex or other antibiotic to cover staph while packing in place - patient already receiving vanc/zosyn  - all other cares per primary team  - please contact ENT on call with any questions or concerns    Tosha Ro PA-C  Otolaryngology-Head & Neck Surgery  Please contact ENT by dialing [phone] and entering job code [phone].

## 2019-08-19 NOTE — PROGRESS NOTES
CRRT STATUS NOTE     Intraoperative CRRT Summary    CRRT treatment performed in the operating room with no complications. Fluid removal titrated per anesthesia team. Range from 0-1200mL off per hour. Total fluid removed 2,719mL. Lines reversed due to extremely negative access pressure, otherwise tolerated well. Plan to restart upon return from operating room.

## 2019-08-19 NOTE — PHARMACY-CONSULT NOTE
Pharmacy Tube Feeding Consult    Medication reviewed for administration by feeding tube and for potential food/drug interactions.    Recommendation: No changes are needed at this time.     Pharmacy will continue to follow as new medications are ordered.    Montse Norwood, PharmD  August 19, 2019

## 2019-08-19 NOTE — PROGRESS NOTES
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    CHANGES:  repeat Liver US   - hold pending TEG  discontinue rifaximin  TEG w/o heparinase  start trickle tube feeds     Neurological  # Acute postoperative pain  - Monitor neurological status. Delirium preventions and precautions.   - Pain: fentanyl, no acetaminophen  - Sedation: propofol, titrate to RAAS -1      # hx of hepatic encephalopathy     Pulmonary  # Post operative ventilatory support  - VENT VC-AC 16/500/8/100: Continue full vent support. PST when meets criteria. Ventilatory bundle. HOB elevation     Cardiovascular  # Shock - hypovolemic/hemorrhagic  - Goal 100 < SBP >140, 8 < CVP <12  - Monitor hemodynamic status with arterial line  - Fluid and product resuscitation to achieve goal   - Norepinephrine gtt for goal MAPs >65  - Dobutamine stress test 6/19/2019 negative for ischemia    # hx of HTN  - PTA spironolactone 100 mg BID, bumetanide 1 mg   - Hold while pressures soft     Gastroenterology/Nutrition  # ESLD 2/2 Alpha-1-antitrypsin and ANGULO s/p DDLT 8/18/19    # heterozygous (MZ genotype) for alpha-1-antitrypsin with secondary iron overload  # hyperbilirubinemia  # umbilical hernia  - MELD at transplant: 39  - most recent EGD 5/7/2019 without esophageal or gastric varices  - Q12h CMP, CBC  - repeat U/S liver txp     # Protein calorie deficit malnutrition   - No indication for parenteral nutrition.  - trickle tube feeds     - PTA famotidine 20 mg BID     Fluids/Electrolytes  - D5 1/2NS @125 for IV fluid hydration     # hyponatremia  # hypocalcemia  - ICU electrolyte replacement protocol     Renal  # acute kidney injury, baseline Cr ~0.6  # history of hepatorenal syndrome  - Monitor intake and output.  - Goal UOP >0.50 ml/kg/hr  - CRRT   - Dialyzed  intraoperatively through left internal jugular line, may require further dialysis pending progression of kidney function overnight     Endocrine  # Stress and steroid hyperglycemia   - Insulin gtt for glucose management. Goal to keep BG< 180 for optimal wound healing      # hx of hypothyroidism  - PTA levothyroxine 125 mcg daily     ID  # Immunosuppression  - Continue zosyn, vanco, micafungin   - Continue ppx: valcyte  - continue immunosuppression: methylprednisolone, mycophenolate     - f/up cultures     # SBP  - discontinue Rifaximin      Heme  # Acute blood loss anemia, EBL 5L  # thrombocytopenia  # coagulopathy  - Transfuse if hgb <8.0 or signs/symptoms of hypoperfusion  - Goal INR <2, Fibrinogen >200, plt >100  - Q4h hgb, PCT, fibrinogen, INR, lactate, ABG  - type and screen q72h  - TEG     Musculoskeletal  # weakness and deconditioning of critical illness   # osteoarthritis  - Physical and occupational therapy consult when extubated     General Cares/Prophylaxis  DVT Prophylaxis: Pneumatic Compression Devices  GI Prophylaxis: pepcid BID   Restraints: No   Lines/ tubes/ drains:  - ETT  - L abd JPx1  - R abd OBED x1  - R internal jugular CVC  - L radial arterial line  - PIVx2  - Forrester  Code: Full  Disposition: Surgical ICU     Patient seen, findings and plan discussed with surgical ICU staff, Dr. Tai Paredes MD   SICU Resident     ====================================    TODAY'S PROGRESS:   SUBJECTIVE:   Patient directly admitted from OR. High EBL of >5 liter with ongoing pressor and volume requirements, received 500cc albumin overnight. Hematologically, patient continues to require platelet and RBC transfusions to achieve goals. Remains intubated.        OVERNIGHT:  500cc albumin  Labs wnl     OBJECTIVE:   1. VITAL SIGNS:   Temp:  [95.7  F (35.4  C)-96.8  F (36  C)] 95.9  F (35.5  C)  Heart Rate:  [] 85  Resp:  [20] 20  BP: (113)/(65) 113/65  MAP:  [64 mmHg-83 mmHg] 69 mmHg  Arterial Line BP:  ()/(51-66) 103/51  FiO2 (%):  [70 %-100 %] 70 %  SpO2:  [100 %] 100 %  Ventilation Mode: CMV/AC  (Continuous Mandatory Ventilation/ Assist Control)  FiO2 (%): 70 %  Rate Set (breaths/minute): 20 breaths/min  Tidal Volume Set (mL): 500 mL  PEEP (cm H2O): 8 cmH2O  Oxygen Concentration (%): 70 %  Resp: 20      2. INTAKE/ OUTPUT:   I/O last 3 completed shifts:  In: 14057.55 [I.V.:1532.55; Other:3616]  Out: 08295 [Urine:807; Emesis/NG output:50; Drains:2255; Other:3020; Blood:5300]    3. PHYSICAL EXAMINATION:   General: in no acute distress  Neuro: Sedated  Resp: intubated  CV: RRR  Abdomen: Soft, Non-distended  Incisions: covered by primopore  Extremities: warm and well perfused; edematous LE b/l    4. INVESTIGATIONS:   Arterial Blood Gases   Recent Labs   Lab 08/19/19 0330 08/19/19  0005 08/18/19 2100 08/18/19 1903   PH 7.42 7.38 7.35 7.37   PCO2 27* 26* 29* 27*   PO2 159* 185* 173* 176*   HCO3 17* 15* 16* 16*     Complete Blood Count   Recent Labs   Lab 08/19/19 0600 08/19/19  0330 08/19/19  0000 08/18/19  2100   WBC 9.2 9.7 9.8 8.4   HGB 9.8* 10.5* 12.0 12.0   * 83* 115* 142*     Basic Metabolic Panel  Recent Labs   Lab 08/19/19  0600 08/19/19  0330 08/19/19  0000 08/18/19  2100    137 139 138   POTASSIUM 4.3 4.2 4.3 4.2   CHLORIDE 104 105 103 104   CO2 20 18* 16* 17*   BUN 26 27 30 28   CR 1.29* 1.33* 1.53* 1.31*   * 173* 211* 169*     Liver Function Tests  Recent Labs   Lab 08/19/19  0600 08/19/19  0330 08/19/19  0000 08/18/19 2100 08/18/19  1903   * 342* 381* 407*  --    * 238* 215* 190*  --    ALKPHOS 52 46 40 43  --    BILITOTAL 9.3* 8.9* 8.5* 8.6*  --    ALBUMIN 2.4* 2.3* 1.8* 1.7*  --    INR  --  1.89* 1.81* 1.88* 2.26*     Pancreatic Enzymes  Recent Labs   Lab 08/19/19  0330 08/17/19  2341 08/16/19  1119   LIPASE 2,509*  --  364   AMYLASE 326* 45  --      Coagulation Profile  Recent Labs   Lab 08/19/19  0330 08/19/19  0000 08/18/19  2100 08/18/19  1903  08/18/19  1758  08/18/19  1605  08/17/19  2341   INR 1.89* 1.81* 1.88* 2.26* 2.79*   < > 3.38*   < > 3.64*   PTT  --   --  42*  --  103*  --  181*  --  66*    < > = values in this interval not displayed.         5. RADIOLOGY:   Recent Results (from the past 24 hour(s))   XR Abdomen Port 1 View    Narrative    XR ABDOMEN PORT 1 VW  8/18/2019 7:49 PM      HISTORY: Lost instruments    COMPARISON: Chest x-ray same day    TECHNIQUE: Supine frontal view of the abdomen    FINDINGS: There is a serpiginous wire-like opacity projecting over the  right mid abdomen with the superior linear aspect projecting over the  common bile duct and the majority of the distal aspect coiled  projecting over the second portion of the duodenum. No other  unexpected radiopaque foreign objects. Surgical drains are in place.  NG/OG tube with tip and side-port projecting over the stomach. Small  amount of pneumoperitoneum predominantly along the right upper  quadrant. No dilated loops of bowel, pneumatosis, or portal venous  gas. Surgical clips in the left upper quadrant. Left retrocardiac  streaky opacity. Possible [place]-Tameka catheter incompletely visualized  ejecting over the heart. Degenerative changes of the lumbar spine      Impression    IMPRESSION:   Wire-like opacity projecting over the right hemiabdomen, likely a  biliary stent. Otherwise, no other unexpected radiographic foreign  objects.    These findings were discussed with OR 21 circulation nurse on  8/18/2019 at 1957 hours.  I have personally reviewed the examination and initial interpretation  and I agree with the findings.    ALEX SANCHEZ MD   XR Chest Port 1 View    Narrative    Single view chest    Comparisons: Same date at 12:04 AM    HISTORY: Line placement    FINDINGS: Endotracheal tube is been placed with the tip in the mid  thoracic trachea. Nasogastric tube with tip and sidehole in the  stomach. Left IJ line with the tip in the high right atrium. Right IJ  sheath  with the tip in the mid superior vena cava. Right IJ [place]-Tameka  catheter with the tip in the right pulmonary artery. Right lung is  clear. Retrocardiac opacity with blunting of the left costophrenic  angle. Pulmonary vascularity is relatively distinct. Cardiac  silhouette is unchanged.      Impression    IMPRESSION:  1. Lines and tubes as described.  2. Left lower lobe consolidation/atelectasis and left pleural  effusion.    ALEX SANCHEZ MD   XR Abdomen Port 1 View    Narrative    Single view of the abdomen    Comparisons: Same date at 1838    HISTORY: OG placement    FINDINGS: Orogastric tube with tip and sidehole in the stomach. Thin  biliary stent in the right abdomen. Drain in the left upper quadrant.  No dilated loop of bowel is identified. Surgical clip in the right  upper quadrant.      Impression    IMPRESSION: Orogastric tube in good position.    ALEX SANCHEZ MD   US Liver Transplant    Narrative    EXAMINATION:  LIVER TRANSPLANT, 2019 11:59 PM     COMPARISON: None available    HISTORY: Status post  donor liver transplant. Initial  evaluation.    TECHNIQUE:  Gray-scale, color Doppler and spectral flow analysis.    FINDINGS: Technically difficult exam due to patient body habitus.    There is no ascites.    Liver:   The liver demonstrates normal homogeneous echotexture. There  is an anechoic unilocular cystic structure near the delmi hepatis  measuring 0.6 x 0.6 x 0.8 cm without internal blood flow consistent  with a simple hepatic cyst. No evidence of a focal hepatic mass. There  is a tiny anechoic fluid collection along superior margin of the liver  measuring 1.8 x 0.6 cm. No internal blood flow.    Bile Ducts: Both the intra- and extrahepatic biliary system are of  normal caliber.  The common bile duct measures 4 mm in diameter.    Gallbladder: The gallbladder is surgically absent.    Kidneys:   Right kidney:  The right kidney demonstrates normal echotexture with  no  evidence of a shadowing stone, focal mass or hydronephrosis.   10.9  cm in long axis dimension.  Left kidney:  The left kidney demonstrates normal echotexture with no  evidence of a shadowing stone, focal mass or hydronephrosis.   9.9 cm  in long axis dimension.    Pancreas: Pancreas is not visualized due to over shadowing air.    Spleen:  The spleen is difficult to visualize due to over shadowing  air, measuring 11.1 cm.    Aorta was not visualized due to over shadowing air.    LIVER DOPPLER:  Splenic vein:  Patent continuous normal antegrade direction flow  towards the liver, 16 cm/sec.  Extrahepatic portal vein:  Patent continuous antegrade flow, 20  cm/sec.  Portal vein at anastomosis: Patent continuous antegrade flow, 23  cm/sec.  Intrahepatic portal vein:  Patent continuous antegrade flow, 15  cm/sec.  Right portal vein flow is antegrade, measuring 10 cm/sec.  Left portal vein flow is antegrade, measuring 14 cm/sec.    Inferior vena cava: patent with flow toward the heart throughout..  IVC above anastomosis:  106 cm/sec.  IVC at anastomosis:  108 cm/sec.  Intrahepatic IVC:  111 cm/sec.  Extrahepatic IVC:  96 cm/sec.    Right, mid, left hepatic veins: Patent with flow towards the inferior  vena cava.    Extrahepatic hepatic artery: Low resistance waveform with slightly  delayed upstroke and increased diastolic flow. Flow towards the liver.  24 cm/sec with resistive index 0.48.  Right hepatic artery: 27 cm/sec with resistive index 0.61.  Left hepatic artery: 31 cm/sec with resistive index 0.21.      Impression    Impression:   1. Small simple cyst near the delmi hepatis. Otherwise normal  grayscale appearance of the transplanted liver.  2. Tiny peritransplant fluid collection along the superior margin of  the liver.  3. Parvis tardis waveforms in the hepatic arteries with low resistive  indices. This is nonspecific in the immediate postoperative setting  and may be artifactual from difficulty obtaining waveforms  due to  patient's body habitus. A non-visualized arteriovenous fistula is very  unlikely, although remains within the differential, as does arterial  anastomotic narrowing. Recommend follow-up. Remainder of the Doppler  evaluation is within normal limits.    I have personally reviewed the examination and initial interpretation  and I agree with the findings.    NARGIS MORTON, DO       =========================================      @[email or web address]@

## 2019-08-19 NOTE — PROGRESS NOTES
CLINICAL NUTRITION SERVICES - BRIEF NOTE    Per team, plan to begin trickle tube feeds via MG tube.     - Orotracheally intubated. Strip gauze packing in left nasal cavity saturated with old blood, no active bleeding. Floseal in right nasal cavity, no active bleeding. Kerlix gauze in oral cavity saturated with old dried blood, no active bleeding    Respiratory: mechanically ventilated/ sedated       INTERVENTIONS  Recommendations / Nutrition Prescription  Monitor ability to adv TF to goal     Implementation  1. Begin TF via NGT with Nutren @ 10 ml/hr and adv per providers request, ( ONLY advance if K+/mg++ WNL and Phos >1.9) to goal @ 45 ml/hr.    -   Additionally order Prosource 1 pkts TID (additional 120 kcals, 33 gm PRO)     -Nutren 1.5 @ 45 mL/hr to provide 1620 kcals (25 kcal/kg/day), 73 gm PRO (1.1 gm/kg/day), 821 mL H2O, 190 gm CHO and no Fiber daily.  ---TF + Prosource: 1740 kcal ( 27 kcal /kg) and 106 gm protein /day(1.7 gm/kg).       2. Monitor K+/Mg++/Phos daily with TF start and advancement to goal infusion to evaluate for refeeding risk, replace per protocol       3. Order free water flushes 30 ml every 4 hours for tube patency.      4. Recommend Certavite (15 ml/day via FT) to ensure adequate micronutrient needs being met.            Peggy Calderon, RD, MS, LD  SICU: [phone]

## 2019-08-19 NOTE — PLAN OF CARE
I/A: hemodynamically stable, on Levophed and Propofol, some hypotension noted and MD notified, Albumin and Platelets ordered and administered, on insulin drip for hyperglycemia, remained unresponsive throughout shift, on CRRT.     Please see flowsheets for frequent vitals and assessments and MAR for medication details.     P: Continue to monitor and notify team of any changes.

## 2019-08-19 NOTE — PROGRESS NOTES
Immunosuppression Note:    Nicci Pemberton is a 59 year old female who is seen today  for immunosuppression management     I, Mandeep Alford MD, I have examined the patient with the resident/PA/Fellow, discussed and agree with the note and findings.  I have reviewed today's vital signs, medications, labs and imaging. I reviewed the immunosuppression medications and levels. 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 Repeat US showed good flow, will continue to monitor LFTs. INR normalizing, has received some cryo for fibrinogen <200. Will aim to wean pressors as able. (currently on norepi 0.04 mcg)  Immunosuppression management:  mg BID, will start tac 1 mg BID. Solu-medrol taper as ordered. Complexity of management:Medium. Contributing factors: anemia and induction  Hematology: Transfusing 2 units today for hgb of 8.2, transfusion goal closer to hgb > 10 at this time due to pressor requirements.  Nasal/oral packing still in place per ENT  Cardiorespiratory: Wean norepi as able   GI/Nutrition: Will start trickle tube feeds today, no plans to advance yet  Endocrine: Steroid induced hyperglycemia, on insulin gtt  Fluid/Electrolytes: MIVF: GAMAL- will continue CRRT at this time  : Forrester to remain due to critical status/close monitoring of Is and Os  Infectious disease: Ppx with vanc/zosyn/micafungin  Prophylaxis: DVT, fall, GI, fungal  Disposition: 4A, appreciate critical cares per SICU team    Medical Decision Making: Medium  Subsequent visit 10358 (moderate level decision  making)    VEENA/Fellow/Resident Provider: Mary Braxton PA-C     Faculty: Mandeep Alford M.D.    __________________________________________________________________  Transplant History: Admitted 8/16/2019 for acute decompensated ANGULO.   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    8/18/2019 (Liver), Postoperative day: 1     Interval History: Unable to obtain a history from the patient due to critical condition, intubation and sedation  Overnight events: POD 1 after liver transplant    ROS:   A 10-point review of systems was negative except as noted above.    Curent Meds:    aspirin  325 mg Oral Daily     [START ON 8/22/2019] basiliximab (SIMULECT) infusion  20 mg Intravenous Once     carboxymethylcellulose PF  2 drop Both Eyes Q6H     famotidine  20 mg Oral BID     levothyroxine  125 mcg Oral Daily     [START ON 8/20/2019] methylPREDNISolone  100 mg Intravenous Once    Followed by     [START ON 8/21/2019] methylPREDNISolone  50 mg Intravenous Once    Followed by     [START ON 8/22/2019] predniSONE  25 mg Oral or Feeding Tube Once    Followed by     [START ON 8/23/2019] predniSONE  10 mg Oral or Feeding Tube Once     micafungin  100 mg Intravenous Q24H     multivitamins w/minerals  15 mL Per Feeding Tube Daily     mycophenolate  750 mg Oral BID IS    Or     mycophenolate  750 mg Oral or NG Tube BID IS     piperacillin-tazobactam  4.5 g Intravenous Q6H     protein modular  1 packet Per Feeding Tube TID     sodium chloride  2 spray Both Nostrils Q6H     sodium chloride (PF)  3 mL Intravenous Q8H     sodium chloride (PF)  3 mL Intracatheter Q8H     tacrolimus  1 mg Per Feeding Tube BID IS     valGANciclovir  450 mg Oral Every Other Day    Or     valGANciclovir  450 mg Oral or NG Tube Every Other Day     vancomycin (VANCOCIN) IV  20 mg/kg Intravenous Q24H     vitamin D3  1,000 Units Oral BID       Physical Exam:     Admit Weight: 104.3 kg (230 lb)    Current Vitals:    /65 (BP Location: Right arm)   Pulse 78   Temp 98.8  F (37.1  C)   Resp 16   Wt 108.4 kg (238 lb 15.7 oz)   SpO2 99%   BMI 43.71 kg/m      CVP (mmHg): 5 mmHg    Vital sign ranges:    Temp:  [95.7  F (35.4  C)-98.8  F (37.1  C)] 98.8  F (37.1  C)  Heart Rate:  [] 96  Resp:  [16-20] 16  BP: (113)/(65) 113/65  MAP:  [64 mmHg-83 mmHg] 70 mmHg  Arterial Line BP: ()/(46-66) 108/48  FiO2 (%):  [40 %-100 %] 40 %  SpO2:  [97 %-100 %] 99 %  Patient Vitals for the past 24 hrs:   BP Temp Temp src Heart Rate Resp SpO2 Weight   08/19/19 1645 -- -- -- 96 -- 99 % --   08/19/19 1630 -- -- -- 98 -- 99 % --   08/19/19 1615 -- -- -- 96 -- 99 % --   08/19/19 1600 -- 98.8  F (37.1  C) -- 95 16 99 % --   08/19/19 1545 -- -- -- 97 -- 99 % --   08/19/19 1530 -- -- -- 95 -- 98 % --   08/19/19 1525 -- -- -- 95 -- 98 % --   08/19/19 1515 -- -- -- 94 -- 98 % --   08/19/19 1500 -- -- -- 94 16 98 % --   08/19/19 1445 -- -- -- 84 -- 98 % --   08/19/19 1430 -- -- -- 97 -- 98 % --   08/19/19 1415 -- -- -- 88 -- 98 % --   08/19/19 1400 -- -- -- 94 16 98 % --   08/19/19 1345 -- -- -- 90 -- 98 % --   08/19/19 1330 -- -- -- 91 -- 98 % --   08/19/19 1315 -- -- -- 93 -- 97 % --   08/19/19 1300 -- -- -- 86 16 97 % --   08/19/19 1245 -- -- -- 95 -- 97 % --   08/19/19 1242 -- 98.4  F (36.9  C) -- 93 -- 97 % --   08/19/19 1239 -- 98.4  F (36.9  C) -- 93 -- 97 % --   08/19/19 1232 -- 98.4  F (36.9  C) -- 94 -- 98 % --   08/19/19 1230 -- -- -- 94 -- 99 % --   08/19/19 1229 -- 98.4  F (36.9  C) -- 92 -- 98 % --   08/19/19 1215 -- -- -- 90 -- 99 % --   08/19/19 1200 -- 98.2  F (36.8  C) Pulm Art 92 16 99 % --   08/19/19 1145 -- -- -- 86 -- 99 % --   08/19/19 1130 -- -- -- 85 -- 100 % --   08/19/19 1115 -- -- -- 87 -- 100 % --   08/19/19 1100 -- 97.7  F (36.5  C) Pulm Art 88 16 100 % --   08/19/19 1052 -- 97.7  F (36.5  C) Pulm Art 87 -- 100 % --   08/19/19 1045 -- -- -- 80 -- 100 % --   08/19/19 1042 -- 97.7  F (36.5  C) Pulm Art 86 --  100 % --   08/19/19 1030 -- -- -- 85 -- 100 % --   08/19/19 1015 -- -- -- 81 -- 100 % --   08/19/19 1014 -- -- -- 80 -- 100 % --   08/19/19 1000 -- 97.5  F (36.4  C) Pulm Art 81 20 100 % --   08/19/19 0945 -- -- -- 90 -- 100 % --   08/19/19 0930 -- -- -- 84 -- 99 % --   08/19/19 0916 -- -- -- 87 -- 99 % --   08/19/19 0915 -- -- -- 87 -- 99 % --   08/19/19 0900 -- -- -- 87 20 99 % --   08/19/19 0845 -- -- -- 92 -- 98 % --   08/19/19 0830 -- -- -- 87 -- 98 % --   08/19/19 0815 -- -- -- 87 -- 98 % --   08/19/19 0800 -- 97.5  F (36.4  C) Pulm Art 86 20 99 % --   08/19/19 0745 -- -- -- 85 -- 100 % --   08/19/19 0730 -- -- -- 84 -- 100 % --   08/19/19 0715 -- -- -- 82 -- 100 % --   08/19/19 0700 -- -- -- 85 20 100 % --   08/19/19 0645 -- -- -- 81 -- 100 % --   08/19/19 0630 -- -- -- 80 -- 100 % --   08/19/19 0615 -- -- -- 84 -- 100 % --   08/19/19 0600 -- 95.9  F (35.5  C) Pulm Art 78 -- 100 % --   08/19/19 0545 -- -- -- 76 -- 100 % --   08/19/19 0530 -- -- -- 77 -- 100 % --   08/19/19 0515 -- -- -- 80 -- 100 % --   08/19/19 0500 -- -- -- 76 -- 100 % --   08/19/19 0450 -- -- -- -- -- 100 % --   08/19/19 0445 -- -- -- 79 -- 100 % --   08/19/19 0430 -- -- -- 81 -- 100 % --   08/19/19 0425 -- 95.9  F (35.5  C) -- 80 20 100 % --   08/19/19 0415 -- 95.9  F (35.5  C) Pulm Art 83 20 100 % --   08/19/19 0400 -- 95.9  F (35.5  C) -- 77 20 100 % --   08/19/19 0345 -- -- -- 77 -- 100 % --   08/19/19 0330 -- -- -- 83 -- 100 % --   08/19/19 0315 -- -- -- 81 -- 100 % --   08/19/19 0300 -- -- -- 83 -- 100 % --   08/19/19 0245 -- -- -- 81 -- 100 % --   08/19/19 0230 -- -- -- 85 -- 100 % --   08/19/19 0215 -- -- -- 87 -- 100 % --   08/19/19 0200 -- 95.7  F (35.4  C) Pulm Art 89 20 100 % --   08/19/19 0145 -- -- -- 94 -- 100 % --   08/19/19 0130 -- -- -- 94 -- 100 % --   08/19/19 0115 -- -- -- 92 -- 100 % --   08/19/19 0100 -- 95.7  F (35.4  C) Pulm Art 99 20 100 % --   08/19/19 0045 -- -- -- 91 -- 100 % 108.4 kg (238 lb 15.7 oz)    08/19/19 0043 -- -- -- -- -- 100 % --   08/19/19 0030 -- -- -- 94 -- 100 % --   08/19/19 0015 -- -- -- 86 -- 100 % --   08/19/19 0000 -- 96.4  F (35.8  C) -- 86 20 100 % --   08/18/19 2345 -- -- -- 98 -- 100 % --   08/18/19 2330 -- -- -- 83 -- 100 % --   08/18/19 2315 -- -- -- 88 -- 100 % --   08/18/19 2300 -- 96.8  F (36  C) Pulm Art 88 20 100 % --   08/18/19 2245 -- -- -- 84 -- 100 % --   08/18/19 2230 -- -- -- 90 -- 100 % --   08/18/19 2215 -- -- -- 106 -- 100 % --   08/18/19 2200 -- 96.8  F (36  C) Pulm Art 104 20 100 % --   08/18/19 2145 -- -- -- 103 -- 100 % --   08/18/19 2130 -- -- -- 95 -- 100 % --   08/18/19 2115 -- -- -- 89 -- 100 % --   08/18/19 2100 113/65 96.8  F (36  C) Pulm Art 89 20 100 % --   08/18/19 2045 -- -- -- 100 -- 100 % --   08/18/19 2041 -- -- -- -- -- 100 % --     General Appearance: intubated, sedated   Skin: normal, warm  Heart: regular rate and rhythm  Lungs: intubated  Abdomen: soft, nondistended, incision covered  : Forrester catheter in place, scant yellow urine  Extremities: edema: present bilaterally. 3+.  Neurologic: sedated    Frailty Scores     There is no flowsheet data to display.          Data:   CMP  Recent Labs   Lab 08/19/19  1555 08/19/19  1206  08/19/19  0330  08/18/19  2100  08/17/19  2341  08/16/19  1119    140   < > 137   < > 138   < > 129*   < > 126*   POTASSIUM 4.5 4.5   < > 4.2   < > 4.2   < > 4.5   < > 5.0   CHLORIDE 108 105   < > 105   < > 104   < > 97   < > 94   CO2 22 23   < > 18*   < > 17*   < > 23   < > 25   * 142*   < > 173*   < > 169*   < > 91   < > 82   BUN 19 22   < > 27   < > 28   < > 45*   < > 40*   CR 1.19* 1.22*   < > 1.33*   < > 1.31*   < > 2.04*   < > 2.30*   GFRESTIMATED 50* 48*   < > 44*   < > 44*   < > 26*   < > 22*   GFRESTBLACK 58* 56*   < > 51*   < > 51*   < > 30*   < > 26*   JACOB 8.1* 8.7   < > 9.3   < > 10.0   < > 8.4*   < > 8.2*   ICAW  --  5.0  --   --   --  5.5*   < >  --   --   --    MAG 2.3 2.4*   < > 2.2   < >  --   --   2.5*  --   --    PHOS 4.5 4.5   < > 4.4   < >  --   --  3.8  --   --    AMYLASE  --   --   --  326*  --   --   --  45  --   --    LIPASE  --   --   --  2,509*  --   --   --   --   --  364   ALBUMIN 2.3* 2.6*   < > 2.3*   < > 1.7*   < > 3.4   < > 1.6*   BILITOTAL 7.7* 8.1*   < > 8.9*   < > 8.6*   < > 26.9*   < > 24.2*   ALKPHOS 68 62   < > 46   < > 43   < > 87   < > 152*   * 738*   < > 342*   < > 407*   < > 75*   < > Canceled, Test credited   * 520*   < > 238*   < > 190*   < > 34   < > 51*    < > = values in this interval not displayed.     CBC  Recent Labs   Lab 08/19/19  1555 08/19/19  1206  08/18/19  2100   HGB 8.3* 8.2*   < > 12.0   WBC 9.1 9.1   < > 8.4   PLT 73* 70*   < > 142*   A1C  --   --   --  5.0    < > = values in this interval not displayed.     COAGS  Recent Labs   Lab 08/19/19  1555 08/19/19  0748  08/18/19  2100  08/18/19  1758   INR 1.60* 1.77*   < > 1.88*   < > 2.79*   PTT  --   --   --  42*  --  103*    < > = values in this interval not displayed.      Urinalysis  Recent Labs   Lab Test 08/16/19  1755 06/18/18  1019   COLOR Dark Yellow Yellow   APPEARANCE Clear Clear   URINEGLC Negative Negative   URINEBILI Moderate* Negative   URINEKETONE Negative Negative   SG 1.007 1.014   UBLD Negative Negative   URINEPH 5.5 8.0*   PROTEIN Negative Negative   NITRITE Negative Negative   LEUKEST Negative Small*   RBCU 0 1   WBCU 1 8*   UTPG  --  Unable to calculate due to low value     Virology:  Hepatitis C Antibody   Date Value Ref Range Status   08/18/2019 Nonreactive NR^Nonreactive Final     Comment:     Assay performance characteristics have not been established for newborns,   infants, and children

## 2019-08-19 NOTE — PHARMACY-VANCOMYCIN DOSING SERVICE
Pharmacy Vancomycin Initial Note  Date of Service 2019  Patient's  1960  59 year old, female    Indication: Intra-abdominal infection and Anisha-op prophylaxis    Current estimated CrCl = Estimated Creatinine Clearance: 32 mL/min (A) (based on SCr of 2.16 mg/dL (H)).    Creatinine for last 3 days  2019: 11:19 AM Creatinine 2.30 mg/dL  2019:  6:49 AM Creatinine 1.96 mg/dL; 11:41 PM Creatinine 2.04 mg/dL  2019:  9:07 AM Creatinine 2.16 mg/dL    Recent Vancomycin Level(s) for last 3 days  No results found for requested labs within last 72 hours.      Vancomycin IV Administrations (past 72 hours)                   vancomycin (VANCOCIN) 1,750 mg in sodium chloride 0.9 % 500 mL intermittent infusion (g) 1.75 g New Bag 19 1145                Nephrotoxins and other renal medications (From now, onward)    Start     Dose/Rate Route Frequency Ordered Stop    19 0800  vancomycin (VANCOCIN) 2,000 mg in sodium chloride 0.9 % 250 mL intermittent infusion      20 mg/kg × 105.7 kg  over 1 Hours Intravenous EVERY 24 HOURS 19 2137      19 0130  piperacillin-tazobactam (ZOSYN) 4.5 g vial to attach to  mL bag      4.5 g  over 30 Minutes Intravenous EVERY 6 HOURS 19 2115 19 0129    19 2100  norepinephrine (LEVOPHED) 16 mg in  mL infusion      0.03-0.4 mcg/kg/min × 105.7 kg (Dosing Weight)  3-39.6 mL/hr  Intravenous CONTINUOUS 19 2059            Contrast Orders - past 72 hours (72h ago, onward)    None                Plan:  1.  Start vancomycin  2000 mg IV q24h on . Received 1750 mg x 1 pre-op. On CRRT.  2.  Goal Trough Level: 15-20 mg/L   3.  Pharmacy will check trough levels as appropriate in 1-3 Days.    4. Serum creatinine levels will be ordered daily for the first week of therapy and at least twice weekly for subsequent weeks.    5. [place] method utilized to dose vancomycin therapy: Method 2    Linda Su, PharmD  Pager [phone]

## 2019-08-19 NOTE — PROGRESS NOTES
Admitted/transferred from: at approximately 2100 8/18 as direct admit from the OR s/p DDLT for ANGULO.  Reason for admission/transfer: immediate post-op cares  Patient status upon admission/transfer: hemodynamically stable on norepi drip infusing at 0.08 mcg/kg/min IV.  Sedated on propofol drip at 40 mcg/kg/min IV.  Insulin drip titrated per protocol.  Intubated.  OGT.  Bilateral nares and oropharyngeal space packed per ENT for bleeding in OR.  Packing not to be removed.  Evaluation and removal per ENT discretion. No lightening of sedation per discussion with MD's.  Interventions: CRRT restarted following order review by nephrology.  Patient admitted and settled per protocol.  Family in to see and updated by RN and transplant fellow  Plan: continue with post-op cares.  Tentative plan to return to OR for washout this afternoon  2 RN skin assessment: completed by Kaya JOSHI and Sam STEVENSON RN  Result of skin assessment and interventions/actions:  Oral and bilateral nasal packing not to be removed so unable to assess oral cavity.  Lips with swelling.  Scleral edema-->eye drops ordered.  Multiple areas of bruising and petichia noted.  Right arm abrasion in ecchymotic area -->cleansed and dressed with mepilex.  Generalized edema.  Surgical sites covered with post-op dressing.  Excoriation and some bleeding noted bilateral groins.  Cleansed, barrier cream applied, interdry dressings.    Height, weight, drug calc weight: done  Patient belongings:  states has cell phone and .  2 [employer] bags with clothing, shoes, and glasses in closet on 4A room 220  MDRO education (if applicable): unable to send MRSA swab 2/2 to nasal packing b/l.

## 2019-08-19 NOTE — PROGRESS NOTES
Nephrology Progress Note  2019      Nicci Pemberton MRN:[id number] YOB: 1960  Date of Admission:2019  Primary care provider: Wale Thurston  Requesting physician: Sulma Cat,*    ASSESSMENT AND RECOMMENDATIONS:   59 year old woman with PMHx of ESLD secondary to ANGULO, A1AT and iron overload, complicated by non-oliguric GAMAL, HE and SBP, HTN, hypothyroidism who was admitted on  for acute decompensated cirrhosis, GAMAL, and dehydration/weakness/fatigue in the setting of diarrhea and underwent  donor liver transplant on  and did have CRRT initiated intra-op.      # Non-Oliguric GAMAL  Cr baseline: 0.6 mg/dl up to 2.3 on admission. Wt up 8 lbs from admission. DDx the etiology of GAMAL included HRS, intra-vascular volume depletion vs tubular bilirubinopathy vs abdominal compartment syndrome compression on renal artery causes decrease renal blood flow. Pt underwent liver transplant on . CRRT was initiated during the operation.   -- Continue CRRT. Will not remove any fluid today given that patient seemed to be volume depleted.    # Blood pressure, volume status:  - On NE 0.07 mcg/kg/min with MAP in 80s.   - CVP 5-6 with +3 pitting edema bilaterally.     # Electrolytes, Acid-Base:  Na, K wnl. Bicarb: low normal.  - Continue CRRT.     # Anemia:  Patient did have a significant amount of bleeding in her oral cavity and from the nose. Followed by ENT. Currently has oral packing and nasal packing.  Lost 5000 ml of blood during the operation. Received 8L crystalloid, 3.6L cellsaver, 4 cryo, 13 FFP, 14 pRBC, 10 plt intra-op.   - Hgb this morning 9.5 earlier this AM and dropped to 8.2. Patient has been getting pRBC transfusion.    - Transfusion per primary team.    # ESLD secondary to ANGULO s/p OLT  (POD#1)  Noted rising AST, ALT.  - Managed by liver transplant team.     Recommendations were communicated to primary team via note.     Seen and discussed with   Sofy.    Roger Warren MD   PGY-3 Internal Medicine  [phone]  Nephrology Service    --------------------------------------------------------------------    Interval event:   - Care team notes reviewed.   - Patient underwent  donor liver transplant last night. EBL 5000 ml.   - CRRT initiated in OR through L internal jugular line and was continued throughout the operation. Total fluid removed 2,719mL. Lines reversed due to extremely negative access pressure, otherwise tolerated well.  - Developed diffuse nasal and oropharyngeal bleeding after intubation and NG placement in OR  for liver transplantation. No identifiable sources of bleeding that are amenable to direct intervention. Thought to be due to coagulopathy from ESLD. Her left nasal passage was packed with strip gauze and oropharynx was packed with Afrin soaked kerlix. Floseal placed in right nasal passage.  - CVP 6 cm. Received 25g of albumin this AM. Remained sedated, intubated.    Current Meds    albumin human         aspirin  325 mg Oral Daily     [START ON 2019] basiliximab (SIMULECT) infusion  20 mg Intravenous Once     carboxymethylcellulose PF  2 drop Both Eyes Q6H     famotidine  20 mg Oral BID     levothyroxine  125 mcg Oral Daily     [START ON 2019] methylPREDNISolone  100 mg Intravenous Once    Followed by     [START ON 2019] methylPREDNISolone  50 mg Intravenous Once    Followed by     [START ON 2019] predniSONE  25 mg Oral or Feeding Tube Once    Followed by     [START ON 2019] predniSONE  10 mg Oral or Feeding Tube Once     micafungin  100 mg Intravenous Q24H     mycophenolate  1,000 mg Oral BID IS    Or     mycophenolate  1,000 mg Oral or NG Tube BID IS     piperacillin-tazobactam  4.5 g Intravenous Q6H     sodium chloride  2 spray Both Nostrils Q6H     sodium chloride (PF)  3 mL Intravenous Q8H     sodium chloride (PF)  3 mL Intracatheter Q8H     valGANciclovir  450 mg Oral Every Other Day    Or      valGANciclovir  450 mg Oral or NG Tube Every Other Day     vancomycin (VANCOCIN) IV  20 mg/kg Intravenous Q24H     vitamin D3  1,000 Units Oral BID     Infusion Meds    IV fluid REPLACEMENT ONLY       dextrose 5% and 0.45% NaCl 125 mL/hr at 08/19/19 0600     CRRT replacement solution 12.5 mL/kg/hr (08/19/19 0853)     fentaNYL 50 mcg/hr (08/19/19 0600)     insulin (regular) 4 Units/hr (08/19/19 0703)     IV fluid REPLACEMENT ONLY       - MEDICATION INSTRUCTIONS -       - MEDICATION INSTRUCTIONS -       norepinephrine 0.07 mcg/kg/min (08/19/19 0600)     CRRT replacement solution 1.892 mL/kg/hr (08/19/19 0056)     CRRT replacement solution 12.5 mL/kg/hr (08/19/19 0853)     propofol (DIPRIVAN) infusion 30 mcg/kg/min (08/19/19 0842)     BETA BLOCKER NOT PRESCRIBED         PHYSICAL EXAM:   /65 (BP Location: Right arm)   Pulse 78   Temp 97.5  F (36.4  C) (Pulmonary Artery)   Resp 20   Wt 108.4 kg (238 lb 15.7 oz)   SpO2 99%   BMI 43.71 kg/m     Date 08/18/19 0700 - 08/19/19 0659   Shift 6855-7328 8474-1416 5261-0490 24 Hour Total   INTAKE   Other 600 3000  3600   Colloid 2000 1000  3000   Platelets 2534 1065  3599   Red Blood Cells 2100 600  2700   Plasma 1681 1879  3560   Cryoprecipitate 108 310  418   Blood Components 220   220   Shift Total(mL/kg) 9243(87.46) 7854(74.31)  67332(161.77)   OUTPUT   Urine 350 210  560   Other 158 2719  2877   Blood 300   300   Shift Total(mL/kg) 808(7.65) 2929(27.71)  3737(35.36)   Weight (kg) 105.69 105.69 105.69 105.69      Admit Weight: 104.3 kg (230 lb)     GENERAL APPEARANCE: jaundice, sedated, intubated  HEENT: scleral icterus, has nasal and oral packings  Pulmonary: intubated, synchronized breathing with the vent  CV: regular rhythm, normal rate, no rub   - JVD not elevated   - 3+ pitting edema from both feet up to both thigh  GI: s/p OLT, incision was covered  MS: no evidence of inflammation in joints, no muscle tenderness  : +durbin with yellowish urine  SKIN:  Jaundice    LABS:     CMP  Recent Labs   Lab 08/19/19  0600 08/19/19  0330 08/19/19  0000 08/18/19  2100  08/17/19  2341    137 139 138   < > 129*   POTASSIUM 4.3 4.2 4.3 4.2   < > 4.5   CHLORIDE 104 105 103 104   < > 97   CO2 20 18* 16* 17*   < > 23   ANIONGAP 16* 14 21* 17*   < > 10   * 173* 211* 169*   < > 91   BUN 26 27 30 28   < > 45*   CR 1.29* 1.33* 1.53* 1.31*   < > 2.04*   GFRESTIMATED 45* 44* 37* 44*   < > 26*   GFRESTBLACK 52* 51* 43* 51*   < > 30*   JACOB 9.3 9.3 9.9 10.0   < > 8.4*   MAG 2.4* 2.2 2.4*  --   --  2.5*   PHOS 3.9 4.4 4.5  --   --  3.8   PROTTOTAL 4.0* 3.9* 3.4* 3.4*   < > 5.2*   ALBUMIN 2.4* 2.3* 1.8* 1.7*   < > 3.4   BILITOTAL 9.3* 8.9* 8.5* 8.6*   < > 26.9*   ALKPHOS 52 46 40 43   < > 87   * 342* 381* 407*   < > 75*   * 238* 215* 190*   < > 34    < > = values in this interval not displayed.     CBC  Recent Labs   Lab 08/19/19  0748 08/19/19  0600 08/19/19  0330 08/19/19  0000   HGB 9.5* 9.8* 10.5* 12.0   WBC 9.0 9.2 9.7 9.8   RBC 3.10* 3.16* 3.44* 3.93   HCT 27.5* 27.5* 30.1* 35.6   MCV 89 87 88 91   MCH 30.6 31.0 30.5 30.5   MCHC 34.5 35.6 34.9 33.7   RDW 16.7* 16.8* 16.6* 16.5*   * 117* 83* 115*     INR  Recent Labs   Lab 08/19/19  0748 08/19/19  0330 08/19/19  0000 08/18/19  2100  08/18/19  1758  08/18/19  1605  08/17/19  2341   INR 1.77* 1.89* 1.81* 1.88*   < > 2.79*   < > 3.38*   < > 3.64*   PTT  --   --   --  42*  --  103*  --  181*  --  66*    < > = values in this interval not displayed.     ABG  Recent Labs   Lab 08/19/19  0914 08/19/19  0330 08/19/19  0005 08/18/19  2100   PH 7.53* 7.42 7.38 7.35   PCO2 23* 27* 26* 29*   PO2 84 159* 185* 173*   HCO3 19* 17* 15* 16*   O2PER 50 80 100 100      URINE STUDIES  Recent Labs   Lab Test 08/16/19  1755 06/18/18  1019   COLOR Dark Yellow Yellow   APPEARANCE Clear Clear   URINEGLC Negative Negative   URINEBILI Moderate* Negative   URINEKETONE Negative Negative   SG 1.007 1.014   UBLD Negative Negative    URINEPH 5.5 8.0*   PROTEIN Negative Negative   NITRITE Negative Negative   LEUKEST Negative Small*   RBCU 0 1   WBCU 1 8*     Recent Labs   Lab Test 06/18/18  1019   UTPG Unable to calculate due to low value     PTH  Recent Labs   Lab Test 06/18/18  1024   PTHI 14*     IRON STUDIES  Recent Labs   Lab Test 02/19/19  0825 06/18/18  1024   IRON 188* 220*   * 244   IRONSAT 95* 90*   DREW 825* 996*       IMAGING:  All imaging studies reviewed by me.

## 2019-08-19 NOTE — PROGRESS NOTES
Transplant Admission Psychosocial Assessment    Patient Name: Nicci Pemberton  : 1960  Age: 59 year old  MRN: [id number]  Date of Initial Social Work Evaluation: 18    Nicci underwent a  donor liver transplant on 19.      Presenting Information   Living Situation: Nicci lives with her  Hugo in a house in [place], [place].    If not local, plans for short term stay: N/A  Previous Functional Status: Nicci was recently placed at [facility] following a hospitalization at [facility] this month.  She was below her baseline and working with PT/OT.  Cultural/Language/Spiritual Considerations: English is patient's primary language.    Support System  Primary Support Person:  Hugo  Other support: adult sons, Wero ([place], [place]) and Leland ([place], [place])  Plan for support in immediate post-transplant period: TCU/ARU placement versus home with skilled home care along with 24 hour care giving from patient's     Health Care Directive  Decision Maker: patient  Alternate Decision Maker:  Hugo is next-of-kin  Health Care Directive: Education provided previously at the time of patient's transplant evaluation.    Mental Health/Coping:   History of Mental Health: no known history  History of Chemical Health: no significant history  Current status: stable  Coping: to be assessed  Services Needed/Recommended: Liver Transplant Support Group    Financial   Income: 's employment  Impact of transplant on income: further assessment is needed  Insurance and medication coverage: [employer] [facility]  Financial concerns: to be assessed  Resources needed: to be assessed    Education provided by SW: to be completed (Social Work role inpatient setting, availability of liver transplant support group, parking information)    Assessment and recommendations and plan: Patient remains intubated and sedated.  Family was not present when I rounded but  did  visit briefly earlier today.        ARIEL Valerio, [facility]  Liver Transplant   Phone [phone]  Pager [phone]

## 2019-08-19 NOTE — PROGRESS NOTES
9:44 AM  2019  Patient removed from [facility] waitlist after  donor liver transplant. [facility]OS ID [id number].   Donor PHS increased risk: NA.   If Yes, MD documentation NA  Anita Carey RN, BA  On Call Organ Coordinator  .

## 2019-08-19 NOTE — BRIEF OP NOTE
[facility], [place]    Brief Operative Note    Pre-operative diagnosis: End Stage Liver Disease  Post-operative diagnosis * No post-op diagnosis entered *  Procedure: Procedure(s):  TRANSPLANT, LIVER, RECIPIENT,  DONOR  BACKBENCH PREPARATION, LIVER  Surgeon: Surgeon(s) and Role:     * Mandeep Alford MD - Primary     * Sarwat Rocha MD - Resident - Assisting     * Renee Allen MD - Fellow - Assisting  Anesthesia: General   Estimated blood loss: 5000cc  Drains: Mark-Suazo x 2  Specimens:   ID Type Source Tests Collected by Time Destination   1 : ascites Fluid Abdomen ANAEROBIC BACTERIAL CULTURE, FLUID CULTURE AEROBIC BACTERIAL, FUNGUS CULTURE, GRAM STAIN Mandeep Alford MD 2019  1:22 PM    A :  Donor GallBladder Organ Gallbladder and Contents SURGICAL PATHOLOGY EXAM Mandeep Alford MD 2019  3:58 PM    B : Native liver and gallbladder Organ Liver SURGICAL PATHOLOGY EXAM Mandeep Alford MD 2019  4:53 PM      Findings:   see op note.  Complications: None.  Implants:  * No implants in log *

## 2019-08-19 NOTE — PLAN OF CARE
4A - Pt with new liver transplant overnight, currently intubated/sedated, no immediate ROM needs at this time. Will re-assess pending participation and hopeful decrease of sedation working towards extubation.

## 2019-08-19 NOTE — ANESTHESIA CARE TRANSFER NOTE
Patient: Nicci Pemberton    Procedure(s):  TRANSPLANT, LIVER, RECIPIENT,  DONOR  BACKBENCH PREPARATION, LIVER    Diagnosis: End Stage Liver Disease  Diagnosis Additional Information: No value filed.    Anesthesia Type:   General     Note:  Airway :ETT  Patient transferred to:ICU  Comments: VS STABLE, SEDATED, TRANSPORTED TO ICU. REPORT RN      Vitals: (Last set prior to Anesthesia Care Transfer)    CRNA VITALS  2019 - 2019             Resp Rate (observed):  19    Resp Rate (set):  19                Electronically Signed By: Renata Coombs  2019  8:41 PM

## 2019-08-19 NOTE — PROGRESS NOTES
Pt arrived from OR intubated by 7.5 ETT secured 22 @ lip and was placed on initial ventilator settings of CMV 20 500 100% +8

## 2019-08-19 NOTE — PROGRESS NOTES
CRRT RESTART NOTE    Reason for Restart: post-op liver transplant following arrival to SICU  Error Code:  na    Patient s Vascular Access: LIJ Catheter                   Placement Confirmed:  YES  Manufacture:  [employer]  Model: Ashkan  Length/Turkish Size:  20 cm x 12 Fr  Flush Volume:  A/V 1.4 ml    DATA:  Procedure:  CVVHDF  Start Time:  0058  Machine#:  4  Filter:  M150  Blood Flow:   200 mL/min  Pre-Replacement Solution: Phoxillum BK 4/2.5  Post-Replacement Solution:  Phoxillum BK 4/2.5  Dialysate Solution: Phoxillum BK 4/2.5  Pre-Replacement Solution Rate:  1300 mL/hr  Post-Replacement Solution Rate: 200 mL/hr  Dialysate Flow Rate:  1300 mL/hr  Patient Removal Rate:  0 mL/hr  Anticoagulation Type and Rate:  NA    ASSESSMENT:  How Patient Tolerated Restart:  well  Vital Signs:  Vitals reviewed.  T 35.4 (PA), BP 80's/50's, MAP 64-65, HR 90  Initial Pressures:  Access:  -97  Filter:  105  Return:  52  TMP: 33  Change in Filter Pressure: 39      INTERVENTIONS:  Lines reversed as poor blood return on red side    PLAN:  Continue fluid removal per plan of care.

## 2019-08-20 LAB
ABO + RH BLD: NORMAL
ABO + RH BLD: NORMAL
ALBUMIN SERPL-MCNC: 2.2 G/DL (ref 3.4–5)
ALBUMIN SERPL-MCNC: 2.2 G/DL (ref 3.4–5)
ALP SERPL-CCNC: 72 U/L (ref 40–150)
ALP SERPL-CCNC: 81 U/L (ref 40–150)
ALT SERPL W P-5'-P-CCNC: 419 U/L (ref 0–50)
ALT SERPL W P-5'-P-CCNC: 487 U/L (ref 0–50)
ANGLE RATE OF CLOT STRENGTH: 61.7 DEGREES (ref 53–72)
ANION GAP SERPL CALCULATED.3IONS-SCNC: 9 MMOL/L (ref 3–14)
ANION GAP SERPL CALCULATED.3IONS-SCNC: 9 MMOL/L (ref 3–14)
ANISOCYTOSIS BLD QL SMEAR: SLIGHT
AST SERPL W P-5'-P-CCNC: 321 U/L (ref 0–45)
AST SERPL W P-5'-P-CCNC: 522 U/L (ref 0–45)
BASE DEFICIT BLDA-SCNC: 0.2 MMOL/L
BASE DEFICIT BLDA-SCNC: 1 MMOL/L
BASE EXCESS BLDA CALC-SCNC: 0.3 MMOL/L
BASE EXCESS BLDA CALC-SCNC: 0.4 MMOL/L
BASOPHILS # BLD AUTO: 0 10E9/L (ref 0–0.2)
BASOPHILS NFR BLD AUTO: 0 %
BILIRUB DIRECT SERPL-MCNC: 3.2 MG/DL (ref 0–0.2)
BILIRUB SERPL-MCNC: 5 MG/DL (ref 0.2–1.3)
BILIRUB SERPL-MCNC: 5.4 MG/DL (ref 0.2–1.3)
BLD GP AB SCN SERPL QL: NORMAL
BLD PROD TYP BPU: NORMAL
BLD UNIT ID BPU: 0
BLOOD BANK CMNT PATIENT-IMP: NORMAL
BLOOD PRODUCT CODE: NORMAL
BPU ID: NORMAL
BUN SERPL-MCNC: 22 MG/DL (ref 7–30)
BUN SERPL-MCNC: 23 MG/DL (ref 7–30)
BURR CELLS BLD QL SMEAR: SLIGHT
CA-I BLD-MCNC: 4.3 MG/DL (ref 4.4–5.2)
CA-I BLD-MCNC: 4.6 MG/DL (ref 4.4–5.2)
CALCIUM SERPL-MCNC: 7.4 MG/DL (ref 8.5–10.1)
CALCIUM SERPL-MCNC: 7.5 MG/DL (ref 8.5–10.1)
CHLORIDE SERPL-SCNC: 107 MMOL/L (ref 94–109)
CHLORIDE SERPL-SCNC: 107 MMOL/L (ref 94–109)
CI HYPOCOAGULATION INDEX: 0.5 RATIO (ref 0–3)
CO2 SERPL-SCNC: 25 MMOL/L (ref 20–32)
CO2 SERPL-SCNC: 25 MMOL/L (ref 20–32)
CREAT SERPL-MCNC: 1.16 MG/DL (ref 0.52–1.04)
CREAT SERPL-MCNC: 1.17 MG/DL (ref 0.52–1.04)
DIFFERENTIAL METHOD BLD: ABNORMAL
EOSINOPHIL # BLD AUTO: 0 10E9/L (ref 0–0.7)
EOSINOPHIL NFR BLD AUTO: 0 %
ERYTHROCYTE [DISTWIDTH] IN BLOOD BY AUTOMATED COUNT: 16.6 % (ref 10–15)
ERYTHROCYTE [DISTWIDTH] IN BLOOD BY AUTOMATED COUNT: 16.7 % (ref 10–15)
ERYTHROCYTE [DISTWIDTH] IN BLOOD BY AUTOMATED COUNT: 17.3 % (ref 10–15)
ERYTHROCYTE [DISTWIDTH] IN BLOOD BY AUTOMATED COUNT: 17.8 % (ref 10–15)
FIBRINOGEN PPP-MCNC: 187 MG/DL (ref 200–420)
FIBRINOGEN PPP-MCNC: 248 MG/DL (ref 200–420)
FIBRINOGEN PPP-MCNC: 252 MG/DL (ref 200–420)
FIBRINOGEN PPP-MCNC: 265 MG/DL (ref 200–420)
G ACTUAL CLOT STRENGTH: 4.7 KD/SC (ref 4.5–11)
GFR SERPL CREATININE-BSD FRML MDRD: 51 ML/MIN/{1.73_M2}
GFR SERPL CREATININE-BSD FRML MDRD: 51 ML/MIN/{1.73_M2}
GLUCOSE BLDC GLUCOMTR-MCNC: 103 MG/DL (ref 70–99)
GLUCOSE BLDC GLUCOMTR-MCNC: 104 MG/DL (ref 70–99)
GLUCOSE BLDC GLUCOMTR-MCNC: 117 MG/DL (ref 70–99)
GLUCOSE BLDC GLUCOMTR-MCNC: 119 MG/DL (ref 70–99)
GLUCOSE BLDC GLUCOMTR-MCNC: 122 MG/DL (ref 70–99)
GLUCOSE BLDC GLUCOMTR-MCNC: 128 MG/DL (ref 70–99)
GLUCOSE BLDC GLUCOMTR-MCNC: 134 MG/DL (ref 70–99)
GLUCOSE BLDC GLUCOMTR-MCNC: 135 MG/DL (ref 70–99)
GLUCOSE BLDC GLUCOMTR-MCNC: 88 MG/DL (ref 70–99)
GLUCOSE BLDC GLUCOMTR-MCNC: 90 MG/DL (ref 70–99)
GLUCOSE BLDC GLUCOMTR-MCNC: 93 MG/DL (ref 70–99)
GLUCOSE BLDC GLUCOMTR-MCNC: 94 MG/DL (ref 70–99)
GLUCOSE BLDC GLUCOMTR-MCNC: 95 MG/DL (ref 70–99)
GLUCOSE BLDC GLUCOMTR-MCNC: 95 MG/DL (ref 70–99)
GLUCOSE SERPL-MCNC: 114 MG/DL (ref 70–99)
GLUCOSE SERPL-MCNC: 152 MG/DL (ref 70–99)
HCO3 BLD-SCNC: 24 MMOL/L (ref 21–28)
HCO3 BLD-SCNC: 25 MMOL/L (ref 21–28)
HCT VFR BLD AUTO: 23.1 % (ref 35–47)
HCT VFR BLD AUTO: 26.8 % (ref 35–47)
HCT VFR BLD AUTO: 27 % (ref 35–47)
HCT VFR BLD AUTO: 27 % (ref 35–47)
HGB BLD-MCNC: 7.8 G/DL (ref 11.7–15.7)
HGB BLD-MCNC: 8.9 G/DL (ref 11.7–15.7)
HGB BLD-MCNC: 9 G/DL (ref 11.7–15.7)
HGB BLD-MCNC: 9 G/DL (ref 11.7–15.7)
INR PPP: 1.36 (ref 0.86–1.14)
INR PPP: 1.37 (ref 0.86–1.14)
INR PPP: 1.42 (ref 0.86–1.14)
INR PPP: 1.47 (ref 0.86–1.14)
K TIME TO SPEC CLOT STRENGTH: 2.7 MINUTE (ref 1–3)
LACTATE BLD-SCNC: 0.8 MMOL/L (ref 0.7–2)
LACTATE BLD-SCNC: 1 MMOL/L (ref 0.7–2)
LACTATE BLD-SCNC: 1.2 MMOL/L (ref 0.7–2)
LACTATE BLD-SCNC: 1.3 MMOL/L (ref 0.7–2)
LY30 LYSIS AT 30 MINUTES: 0 % (ref 0–8)
LY60 LYSIS AT 60 MINUTES: 0 % (ref 0–15)
LYMPHOCYTES # BLD AUTO: 0.1 10E9/L (ref 0.8–5.3)
LYMPHOCYTES NFR BLD AUTO: 0.9 %
MA MAXIMUM CLOT STRENGTH: 48.6 MM (ref 50–70)
MAGNESIUM SERPL-MCNC: 2.5 MG/DL (ref 1.6–2.3)
MCH RBC QN AUTO: 29.6 PG (ref 26.5–33)
MCH RBC QN AUTO: 29.8 PG (ref 26.5–33)
MCH RBC QN AUTO: 30.1 PG (ref 26.5–33)
MCH RBC QN AUTO: 30.4 PG (ref 26.5–33)
MCHC RBC AUTO-ENTMCNC: 33 G/DL (ref 31.5–36.5)
MCHC RBC AUTO-ENTMCNC: 33.3 G/DL (ref 31.5–36.5)
MCHC RBC AUTO-ENTMCNC: 33.6 G/DL (ref 31.5–36.5)
MCHC RBC AUTO-ENTMCNC: 33.8 G/DL (ref 31.5–36.5)
MCV RBC AUTO: 89 FL (ref 78–100)
MCV RBC AUTO: 90 FL (ref 78–100)
MICROCYTES BLD QL SMEAR: PRESENT
MONOCYTES # BLD AUTO: 0.2 10E9/L (ref 0–1.3)
MONOCYTES NFR BLD AUTO: 2.7 %
NEUTROPHILS # BLD AUTO: 7.2 10E9/L (ref 1.6–8.3)
NEUTROPHILS NFR BLD AUTO: 96.4 %
NUM BPU REQUESTED: 1
NUM BPU REQUESTED: 1
NUM BPU REQUESTED: 20
NUM BPU REQUESTED: 5
O2/TOTAL GAS SETTING VFR VENT: 35 %
O2/TOTAL GAS SETTING VFR VENT: 40 %
OXYHGB MFR BLD: 97 % (ref 92–100)
PCO2 BLD: 36 MM HG (ref 35–45)
PCO2 BLD: 38 MM HG (ref 35–45)
PCO2 BLD: 39 MM HG (ref 35–45)
PCO2 BLD: 39 MM HG (ref 35–45)
PH BLD: 7.4 PH (ref 7.35–7.45)
PH BLD: 7.41 PH (ref 7.35–7.45)
PH BLD: 7.41 PH (ref 7.35–7.45)
PH BLD: 7.44 PH (ref 7.35–7.45)
PHOSPHATE SERPL-MCNC: 4.6 MG/DL (ref 2.5–4.5)
PHOSPHATE SERPL-MCNC: 4.6 MG/DL (ref 2.5–4.5)
PHOSPHATE SERPL-MCNC: 4.7 MG/DL (ref 2.5–4.5)
PHOSPHATE SERPL-MCNC: 4.9 MG/DL (ref 2.5–4.5)
PLATELET # BLD AUTO: 42 10E9/L (ref 150–450)
PLATELET # BLD AUTO: 48 10E9/L (ref 150–450)
PLATELET # BLD AUTO: 52 10E9/L (ref 150–450)
PLATELET # BLD AUTO: 55 10E9/L (ref 150–450)
PO2 BLD: 103 MM HG (ref 80–105)
PO2 BLD: 105 MM HG (ref 80–105)
PO2 BLD: 133 MM HG (ref 80–105)
PO2 BLD: 90 MM HG (ref 80–105)
POIKILOCYTOSIS BLD QL SMEAR: SLIGHT
POTASSIUM SERPL-SCNC: 4.5 MMOL/L (ref 3.4–5.3)
POTASSIUM SERPL-SCNC: 4.6 MMOL/L (ref 3.4–5.3)
PROT SERPL-MCNC: 4.1 G/DL (ref 6.8–8.8)
PROT SERPL-MCNC: 4.4 G/DL (ref 6.8–8.8)
R TIME UNTIL CLOT FORMS: 3.6 MINUTE (ref 5–10)
RBC # BLD AUTO: 2.57 10E12/L (ref 3.8–5.2)
RBC # BLD AUTO: 2.99 10E12/L (ref 3.8–5.2)
RBC # BLD AUTO: 2.99 10E12/L (ref 3.8–5.2)
RBC # BLD AUTO: 3.04 10E12/L (ref 3.8–5.2)
SODIUM SERPL-SCNC: 141 MMOL/L (ref 133–144)
SODIUM SERPL-SCNC: 141 MMOL/L (ref 133–144)
SPECIMEN EXP DATE BLD: NORMAL
TRANSFUSION STATUS PATIENT QL: NORMAL
WBC # BLD AUTO: 7.2 10E9/L (ref 4–11)
WBC # BLD AUTO: 7.4 10E9/L (ref 4–11)
WBC # BLD AUTO: 7.5 10E9/L (ref 4–11)
WBC # BLD AUTO: 7.5 10E9/L (ref 4–11)

## 2019-08-20 PROCEDURE — P9016 RBC LEUKOCYTES REDUCED: HCPCS | Performed by: SURGERY

## 2019-08-20 PROCEDURE — 80053 COMPREHEN METABOLIC PANEL: CPT | Performed by: STUDENT IN AN ORGANIZED HEALTH CARE EDUCATION/TRAINING PROGRAM

## 2019-08-20 PROCEDURE — 85384 FIBRINOGEN ACTIVITY: CPT | Performed by: GENERAL ACUTE CARE HOSPITAL

## 2019-08-20 PROCEDURE — 25000132 ZZH RX MED GY IP 250 OP 250 PS 637: Performed by: SURGERY

## 2019-08-20 PROCEDURE — 12000001 ZZH R&B MED SURG/OB UMMC

## 2019-08-20 PROCEDURE — 25000125 ZZHC RX 250: Performed by: GENERAL ACUTE CARE HOSPITAL

## 2019-08-20 PROCEDURE — P9037 PLATE PHERES LEUKOREDU IRRAD: HCPCS | Performed by: STUDENT IN AN ORGANIZED HEALTH CARE EDUCATION/TRAINING PROGRAM

## 2019-08-20 PROCEDURE — 40000275 ZZH STATISTIC RCP TIME EA 10 MIN

## 2019-08-20 PROCEDURE — 86901 BLOOD TYPING SEROLOGIC RH(D): CPT

## 2019-08-20 PROCEDURE — 25000128 H RX IP 250 OP 636: Performed by: STUDENT IN AN ORGANIZED HEALTH CARE EDUCATION/TRAINING PROGRAM

## 2019-08-20 PROCEDURE — 25800025 ZZH RX 258: Performed by: SURGERY

## 2019-08-20 PROCEDURE — 80053 COMPREHEN METABOLIC PANEL: CPT | Performed by: GENERAL ACUTE CARE HOSPITAL

## 2019-08-20 PROCEDURE — 00000146 ZZHCL STATISTIC GLUCOSE BY METER IP

## 2019-08-20 PROCEDURE — 25000125 ZZHC RX 250: Performed by: INTERNAL MEDICINE

## 2019-08-20 PROCEDURE — 94003 VENT MGMT INPAT SUBQ DAY: CPT

## 2019-08-20 PROCEDURE — 40000048 ZZH STATISTIC DAILY SWAN MONITORING

## 2019-08-20 PROCEDURE — P9012 CRYOPRECIPITATE EACH UNIT: HCPCS | Performed by: STUDENT IN AN ORGANIZED HEALTH CARE EDUCATION/TRAINING PROGRAM

## 2019-08-20 PROCEDURE — 20000004 ZZH R&B ICU UMMC

## 2019-08-20 PROCEDURE — 83735 ASSAY OF MAGNESIUM: CPT | Performed by: STUDENT IN AN ORGANIZED HEALTH CARE EDUCATION/TRAINING PROGRAM

## 2019-08-20 PROCEDURE — 83735 ASSAY OF MAGNESIUM: CPT | Performed by: GENERAL ACUTE CARE HOSPITAL

## 2019-08-20 PROCEDURE — 85610 PROTHROMBIN TIME: CPT | Performed by: GENERAL ACUTE CARE HOSPITAL

## 2019-08-20 PROCEDURE — 84100 ASSAY OF PHOSPHORUS: CPT | Performed by: STUDENT IN AN ORGANIZED HEALTH CARE EDUCATION/TRAINING PROGRAM

## 2019-08-20 PROCEDURE — 85396 CLOTTING ASSAY WHOLE BLOOD: CPT | Performed by: TRANSPLANT SURGERY

## 2019-08-20 PROCEDURE — 84100 ASSAY OF PHOSPHORUS: CPT | Performed by: GENERAL ACUTE CARE HOSPITAL

## 2019-08-20 PROCEDURE — 99233 SBSQ HOSP IP/OBS HIGH 50: CPT | Mod: GC | Performed by: SURGERY

## 2019-08-20 PROCEDURE — 25000128 H RX IP 250 OP 636: Performed by: NURSE PRACTITIONER

## 2019-08-20 PROCEDURE — 82955 ASSAY OF G6PD ENZYME: CPT | Performed by: GENERAL ACUTE CARE HOSPITAL

## 2019-08-20 PROCEDURE — 85610 PROTHROMBIN TIME: CPT | Performed by: STUDENT IN AN ORGANIZED HEALTH CARE EDUCATION/TRAINING PROGRAM

## 2019-08-20 PROCEDURE — 25000128 H RX IP 250 OP 636

## 2019-08-20 PROCEDURE — 25000125 ZZHC RX 250: Performed by: SURGERY

## 2019-08-20 PROCEDURE — 25800030 ZZH RX IP 258 OP 636: Performed by: NURSE PRACTITIONER

## 2019-08-20 PROCEDURE — 40000196 ZZH STATISTIC RAPCV CVP MONITORING

## 2019-08-20 PROCEDURE — 86850 RBC ANTIBODY SCREEN: CPT

## 2019-08-20 PROCEDURE — 90947 DIALYSIS REPEATED EVAL: CPT

## 2019-08-20 PROCEDURE — 83605 ASSAY OF LACTIC ACID: CPT | Performed by: NURSE PRACTITIONER

## 2019-08-20 PROCEDURE — 25000128 H RX IP 250 OP 636: Performed by: SURGERY

## 2019-08-20 PROCEDURE — 25000125 ZZHC RX 250: Performed by: STUDENT IN AN ORGANIZED HEALTH CARE EDUCATION/TRAINING PROGRAM

## 2019-08-20 PROCEDURE — 25800030 ZZH RX IP 258 OP 636: Performed by: SURGERY

## 2019-08-20 PROCEDURE — 40000344 ZZHCL STATISTIC THAWING COMPONENT: Performed by: STUDENT IN AN ORGANIZED HEALTH CARE EDUCATION/TRAINING PROGRAM

## 2019-08-20 PROCEDURE — 82248 BILIRUBIN DIRECT: CPT | Performed by: STUDENT IN AN ORGANIZED HEALTH CARE EDUCATION/TRAINING PROGRAM

## 2019-08-20 PROCEDURE — 82330 ASSAY OF CALCIUM: CPT | Performed by: NURSE PRACTITIONER

## 2019-08-20 PROCEDURE — 82803 BLOOD GASES ANY COMBINATION: CPT | Performed by: NURSE PRACTITIONER

## 2019-08-20 PROCEDURE — 86923 COMPATIBILITY TEST ELECTRIC: CPT

## 2019-08-20 PROCEDURE — 40000014 ZZH STATISTIC ARTERIAL MONITORING DAILY

## 2019-08-20 PROCEDURE — 85384 FIBRINOGEN ACTIVITY: CPT | Performed by: STUDENT IN AN ORGANIZED HEALTH CARE EDUCATION/TRAINING PROGRAM

## 2019-08-20 PROCEDURE — 25000132 ZZH RX MED GY IP 250 OP 250 PS 637: Performed by: STUDENT IN AN ORGANIZED HEALTH CARE EDUCATION/TRAINING PROGRAM

## 2019-08-20 PROCEDURE — 25000131 ZZH RX MED GY IP 250 OP 636 PS 637: Performed by: PHYSICIAN ASSISTANT

## 2019-08-20 PROCEDURE — 85027 COMPLETE CBC AUTOMATED: CPT | Performed by: GENERAL ACUTE CARE HOSPITAL

## 2019-08-20 PROCEDURE — 86900 BLOOD TYPING SEROLOGIC ABO: CPT

## 2019-08-20 PROCEDURE — 82810 BLOOD GASES O2 SAT ONLY: CPT | Performed by: NURSE PRACTITIONER

## 2019-08-20 PROCEDURE — 85025 COMPLETE CBC W/AUTO DIFF WBC: CPT | Performed by: STUDENT IN AN ORGANIZED HEALTH CARE EDUCATION/TRAINING PROGRAM

## 2019-08-20 RX ORDER — METHYLPREDNISOLONE SODIUM SUCCINATE 125 MG/2ML
INJECTION, POWDER, LYOPHILIZED, FOR SOLUTION INTRAMUSCULAR; INTRAVENOUS
Status: COMPLETED
Start: 2019-08-20 | End: 2019-08-20

## 2019-08-20 RX ADMIN — FAMOTIDINE 20 MG: 20 TABLET ORAL at 08:03

## 2019-08-20 RX ADMIN — DEXTROSE AND SODIUM CHLORIDE: 5; 450 INJECTION, SOLUTION INTRAVENOUS at 00:18

## 2019-08-20 RX ADMIN — MULTIVITAMIN 15 ML: LIQUID ORAL at 08:03

## 2019-08-20 RX ADMIN — PROPOFOL 10 MCG/KG/MIN: 10 INJECTION, EMULSION INTRAVENOUS at 06:12

## 2019-08-20 RX ADMIN — SALINE NASAL SPRAY 2 SPRAY: 1.5 SOLUTION NASAL at 02:32

## 2019-08-20 RX ADMIN — PIPERACILLIN SODIUM,TAZOBACTAM SODIUM 4.5 G: 4; .5 INJECTION, POWDER, FOR SOLUTION INTRAVENOUS at 08:07

## 2019-08-20 RX ADMIN — MELATONIN 1000 UNITS: at 08:03

## 2019-08-20 RX ADMIN — Medication 2 DROP: at 18:23

## 2019-08-20 RX ADMIN — CALCIUM CHLORIDE, MAGNESIUM CHLORIDE, SODIUM CHLORIDE, SODIUM BICARBONATE, POTASSIUM CHLORIDE AND SODIUM PHOSPHATE DIBASIC DIHYDRATE 1.89 ML/KG/HR: 3.68; 3.05; 6.34; 3.09; .314; .187 INJECTION INTRAVENOUS at 00:08

## 2019-08-20 RX ADMIN — HUMAN INSULIN 2 UNITS/HR: 100 INJECTION, SOLUTION SUBCUTANEOUS at 14:22

## 2019-08-20 RX ADMIN — CALCIUM CHLORIDE, MAGNESIUM CHLORIDE, SODIUM CHLORIDE, SODIUM BICARBONATE, POTASSIUM CHLORIDE AND SODIUM PHOSPHATE DIBASIC DIHYDRATE 12.5 ML/KG/HR: 3.68; 3.05; 6.34; 3.09; .314; .187 INJECTION INTRAVENOUS at 08:08

## 2019-08-20 RX ADMIN — CALCIUM CHLORIDE, MAGNESIUM CHLORIDE, SODIUM CHLORIDE, SODIUM BICARBONATE, POTASSIUM CHLORIDE AND SODIUM PHOSPHATE DIBASIC DIHYDRATE 12.5 ML/KG/HR: 3.68; 3.05; 6.34; 3.09; .314; .187 INJECTION INTRAVENOUS at 22:48

## 2019-08-20 RX ADMIN — Medication 2 DROP: at 12:36

## 2019-08-20 RX ADMIN — ASPIRIN 325 MG ORAL TABLET 325 MG: 325 PILL ORAL at 08:03

## 2019-08-20 RX ADMIN — DEXTROSE AND SODIUM CHLORIDE: 5; 450 INJECTION, SOLUTION INTRAVENOUS at 09:11

## 2019-08-20 RX ADMIN — CALCIUM CHLORIDE, MAGNESIUM CHLORIDE, SODIUM CHLORIDE, SODIUM BICARBONATE, POTASSIUM CHLORIDE AND SODIUM PHOSPHATE DIBASIC DIHYDRATE 12.5 ML/KG/HR: 3.68; 3.05; 6.34; 3.09; .314; .187 INJECTION INTRAVENOUS at 04:07

## 2019-08-20 RX ADMIN — MYCOPHENOLATE MOFETIL 750 MG: 200 POWDER, FOR SUSPENSION ORAL at 18:24

## 2019-08-20 RX ADMIN — FENTANYL CITRATE 50 MCG/HR: 50 INJECTION INTRAVENOUS at 04:55

## 2019-08-20 RX ADMIN — VANCOMYCIN HYDROCHLORIDE 2000 MG: 10 INJECTION, POWDER, LYOPHILIZED, FOR SOLUTION INTRAVENOUS at 09:11

## 2019-08-20 RX ADMIN — HUMAN INSULIN 3 UNITS/HR: 100 INJECTION, SOLUTION SUBCUTANEOUS at 02:39

## 2019-08-20 RX ADMIN — MELATONIN 1000 UNITS: at 19:37

## 2019-08-20 RX ADMIN — METHYLPREDNISOLONE SODIUM SUCCINATE 100 MG: 125 INJECTION, POWDER, FOR SOLUTION INTRAMUSCULAR; INTRAVENOUS at 08:04

## 2019-08-20 RX ADMIN — Medication 1 PACKET: at 14:23

## 2019-08-20 RX ADMIN — Medication 1 PACKET: at 19:53

## 2019-08-20 RX ADMIN — MYCOPHENOLATE MOFETIL 750 MG: 200 POWDER, FOR SUSPENSION ORAL at 08:05

## 2019-08-20 RX ADMIN — Medication 2 DROP: at 23:38

## 2019-08-20 RX ADMIN — PIPERACILLIN SODIUM,TAZOBACTAM SODIUM 4.5 G: 4; .5 INJECTION, POWDER, FOR SOLUTION INTRAVENOUS at 14:23

## 2019-08-20 RX ADMIN — PIPERACILLIN SODIUM,TAZOBACTAM SODIUM 4.5 G: 4; .5 INJECTION, POWDER, FOR SOLUTION INTRAVENOUS at 01:35

## 2019-08-20 RX ADMIN — CALCIUM CHLORIDE, MAGNESIUM CHLORIDE, SODIUM CHLORIDE, SODIUM BICARBONATE, POTASSIUM CHLORIDE AND SODIUM PHOSPHATE DIBASIC DIHYDRATE 12.5 ML/KG/HR: 3.68; 3.05; 6.34; 3.09; .314; .187 INJECTION INTRAVENOUS at 19:31

## 2019-08-20 RX ADMIN — SALINE NASAL SPRAY 2 SPRAY: 1.5 SOLUTION NASAL at 14:23

## 2019-08-20 RX ADMIN — CALCIUM CHLORIDE, MAGNESIUM CHLORIDE, SODIUM CHLORIDE, SODIUM BICARBONATE, POTASSIUM CHLORIDE AND SODIUM PHOSPHATE DIBASIC DIHYDRATE 12.5 ML/KG/HR: 3.68; 3.05; 6.34; 3.09; .314; .187 INJECTION INTRAVENOUS at 18:50

## 2019-08-20 RX ADMIN — MICAFUNGIN SODIUM 100 MG: 10 INJECTION, POWDER, LYOPHILIZED, FOR SOLUTION INTRAVENOUS at 12:33

## 2019-08-20 RX ADMIN — SALINE NASAL SPRAY 2 SPRAY: 1.5 SOLUTION NASAL at 08:06

## 2019-08-20 RX ADMIN — Medication 2 DROP: at 05:43

## 2019-08-20 RX ADMIN — CALCIUM CHLORIDE, MAGNESIUM CHLORIDE, SODIUM CHLORIDE, SODIUM BICARBONATE, POTASSIUM CHLORIDE AND SODIUM PHOSPHATE DIBASIC DIHYDRATE 12.5 ML/KG/HR: 3.68; 3.05; 6.34; 3.09; .314; .187 INJECTION INTRAVENOUS at 00:08

## 2019-08-20 RX ADMIN — CALCIUM CHLORIDE 1 G: 100 INJECTION, SOLUTION INTRAVENOUS at 23:20

## 2019-08-20 RX ADMIN — METHYLPREDNISOLONE: 125 INJECTION, POWDER, LYOPHILIZED, FOR SOLUTION INTRAMUSCULAR; INTRAVENOUS at 08:34

## 2019-08-20 RX ADMIN — SALINE NASAL SPRAY 2 SPRAY: 1.5 SOLUTION NASAL at 19:53

## 2019-08-20 RX ADMIN — PIPERACILLIN SODIUM,TAZOBACTAM SODIUM 4.5 G: 4; .5 INJECTION, POWDER, FOR SOLUTION INTRAVENOUS at 19:38

## 2019-08-20 RX ADMIN — Medication 1 MG: at 08:04

## 2019-08-20 RX ADMIN — LEVOTHYROXINE SODIUM 125 MCG: 0.12 TABLET ORAL at 08:03

## 2019-08-20 RX ADMIN — Medication 1 MG: at 18:23

## 2019-08-20 RX ADMIN — FAMOTIDINE 20 MG: 20 TABLET ORAL at 19:37

## 2019-08-20 RX ADMIN — Medication 1 PACKET: at 08:04

## 2019-08-20 ASSESSMENT — ACTIVITIES OF DAILY LIVING (ADL)
ADLS_ACUITY_SCORE: 26
ADLS_ACUITY_SCORE: 28

## 2019-08-20 NOTE — PROGRESS NOTES
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 Renal is following for volume and CRRT management.    # Non-oliguric GAMAL   Cr baseline: 0.6 mg/dl up to 2.3 on admission. Wt up 8 lbs from admission. DDx the etiology of GAMAL included HRS, intra-vascular volume depletion vs tubular bilirubinopathy vs abdominal compartment syndrome compression on renal artery causes decrease renal blood flow. Pt underwent liver transplant on . CRRT was initiated during the operation.   -- Continue CRRT. Will increase fluid removal today (0-100 ml/hr) given anasarca and at least 18L since admission.     # Blood pressure, volume status: Off pressors since 6pm . BP has been stable with MAPs in 80s. Notable anasarca. CVP 11.  -- Continue CRRT    # Electrolytes, Acid-Base:  Na, K wnl. Bicarb: low normal.  - Continue CRRT.     # Anemia:  Patient did have a significant amount of bleeding in her oral cavity and from the nose. Followed by ENT. Currently has oral packing and nasal packing.  Lost 5000 ml of blood during the operation. Received 8L crystalloid, 3.6L cellsaver, 4 cryo, 13 FFP, 14 pRBC, 10 plt intra-op. Hgb 7.8 today (). Has been getting blood transfusion with the goal Hgb > 8.   - Transfusion per primary team.     # ESLD secondary to ANGULO s/p OLT  (POD#1)  Noted rising AST, ALT.  - Managed by liver transplant team.     Recommendations were communicated to primary team via note.  Roger Warren MD   Internal Medicine,  PGY3  [phone]    -------------------------------------------------------------    Interval History :   - Care team notes reviewed.  - Patient remained sedated.   - Off levophed with MAP >65. CVP up to 11.  - Removed fluid through CRRT 317 ml yesterday. 700s ml today as of 9am.    Review of Systems:   (4 pt ROS reviewed alone is not adequate unless you detail systems you reviewed)  Intubated and sedated. Unable to obtain ROS.    Physical Exam:   I/O last 3 completed shifts:  In: 7963.91 [I.V.:4516.91; Other:52; NG/GT:330]  Out: 3148 [Urine:57; Emesis/NG output:50; Drains:2355; Other:686]   /65 (BP Location: Right arm)   Pulse 78   Temp 97.5  F (36.4  C) (Pulmonary Artery)   Resp 16   Wt 109 kg (240 lb 4.8 oz)   SpO2 100%   BMI 43.95 kg/m       GENERAL APPEARANCE: jaundice, sedated, intubated, anasarca  HEENT: scleral icterus, has nasal and oral packings  Pulmonary: intubated, synchronized breathing with the vent  CV: regular rhythm, normal rate, no rub   - JVD not elevated   - 3+ pitting edema from both feet up to both thigh  GI: s/p OLT, incision was covered  EXT: 3+ pitting edema at all 4 extremities  : +durbin with yellowish urine  SKIN: Jaundice    Labs:   All labs reviewed by me  Electrolytes/Renal -   Recent Labs   Lab Test 08/20/19 0326 08/19/19  2136 08/19/19  1555    140 141   POTASSIUM 4.5 4.7 4.5   CHLORIDE 107 106 108   CO2 25 24 22   BUN 22 20 19   CR 1.16* 1.28* 1.19*   * 153* 134*   JACOB 7.5* 7.8* 8.1*   MAG 2.5* 2.5* 2.3   PHOS 4.9* 5.3* 4.5       CBC -   Recent Labs   Lab Test 08/20/19 0326 08/19/19 2136 08/19/19  1555   WBC 7.5 7.7 9.1   HGB 7.8* 7.2* 8.3*   PLT 52* 71* 73*       LFTs -   Recent Labs   Lab Test 08/20/19 0326 08/19/19 2136 08/19/19  1555   ALKPHOS 72 71 68   BILITOTAL 5.4* 6.5* 7.7*   * 492* 528*   * 593* 715*   PROTTOTAL 4.1* 4.1* 3.9*   ALBUMIN 2.2* 2.3* 2.3*       Iron Panel -   Recent Labs   Lab Test 02/19/19  0825 06/18/18  1024    IRON 188* 220*   IRONSAT 95* 90*   DREW 825* 996*         Imaging:      Current Medications:    aspirin  325 mg Oral Daily     [START ON 8/22/2019] basiliximab (SIMULECT) infusion  20 mg Intravenous Once     carboxymethylcellulose PF  2 drop Both Eyes Q6H     famotidine  20 mg Oral BID     levothyroxine  125 mcg Oral Daily     [START ON 8/21/2019] methylPREDNISolone  50 mg Intravenous Once    Followed by     [START ON 8/22/2019] predniSONE  25 mg Oral or Feeding Tube Once    Followed by     [START ON 8/23/2019] predniSONE  10 mg Oral or Feeding Tube Once     micafungin  100 mg Intravenous Q24H     multivitamins w/minerals  15 mL Per Feeding Tube Daily     mycophenolate  750 mg Oral BID IS    Or     mycophenolate  750 mg Oral or NG Tube BID IS     piperacillin-tazobactam  4.5 g Intravenous Q6H     protein modular  1 packet Per Feeding Tube TID     sodium chloride  2 spray Both Nostrils Q6H     sodium chloride (PF)  3 mL Intravenous Q8H     sodium chloride (PF)  3 mL Intracatheter Q8H     tacrolimus  1 mg Per Feeding Tube BID IS     valGANciclovir  450 mg Oral Every Other Day    Or     valGANciclovir  450 mg Oral or NG Tube Every Other Day     vancomycin (VANCOCIN) IV  20 mg/kg Intravenous Q24H     vitamin D3  1,000 Units Oral BID       IV fluid REPLACEMENT ONLY       IV fluid REPLACEMENT ONLY       dextrose 5% and 0.45% NaCl 125 mL/hr at 08/20/19 0911     CRRT replacement solution 12.5 mL/kg/hr (08/20/19 0808)     fentaNYL 50 mcg/hr (08/20/19 0700)     insulin (regular) 3 Units/hr (08/20/19 0700)     IV fluid REPLACEMENT ONLY       - MEDICATION INSTRUCTIONS -       - MEDICATION INSTRUCTIONS -       norepinephrine Stopped (08/19/19 1815)     CRRT replacement solution 1.892 mL/kg/hr (08/20/19 0008)     CRRT replacement solution 12.5 mL/kg/hr (08/20/19 0808)     propofol (DIPRIVAN) infusion 10 mcg/kg/min (08/20/19 0700)     BETA BLOCKER NOT PRESCRIBED       Roger Warren MD

## 2019-08-20 NOTE — PLAN OF CARE
D/I/A: Arouses to stimulation and SAUER. Propofol gtt stopped @ 1430. LS clear/diminished; failed CPAP trial this AM r/t apnea. BS hypoactive, no BM, TF @ 10 ml/hr. Anuric. Oral packing removed @ 1400 per ENT gentle oral car performed tolerated well. Nasal packing present. Anasarca present +4 generalized edema. VSS and afebrile    P: Negative CPOT. Q 2hour turns. Fall risk precautions. Gentle oral care. ENT to re-evaluate nasal packing 8/21. RASS goal -1. Wean ventilator as able. CRRT net neg goal 0-100 ml/hr

## 2019-08-20 NOTE — PLAN OF CARE
Patient on Levophed at 0.07 meq/kg/min most of the shift. The propofol turned to off at 1800 to wake patient and Levophed turned to off as well. Patient woke and moved upper extremeties and grimacing, but nothing to command. Propofol was turned back on at 10 meq/kg/min. Levophed remains off. CRRT remains on with zero removal. Patient's CVP has been 5-6 all shift despite 500 Albumin and blood products. Patient received 3 platelets, 1 cryo, and 1 unit of PRBC's. OBED output serousang and not bloody. The oral cavity and bilateral nares remain packed per ENT. They are no to be removed until ENT re examines tomorrow. Patient's  here today and updated on the plan of care. Refer to flow sheets for further details.

## 2019-08-20 NOTE — PROGRESS NOTES
CRRT STATUS NOTE    DATA:  Time:  6:18 AM  Pressures WNL: YES  Filter Status:  Some fibrin noted in filter    Problems Reported/Alarms Noted:  none    Supplies Present:  YES    ASSESSMENT:  Patient Net Fluid Balance: Net positive 4233 8/19 and net positive 858 since midnight.  Net positive 18,000 since admission.  Vital Signs: vitals reviewed.  Off pressors.  Minimal sedation on propofol drip at 10 mcg/kg/min IV and fentanyl 50 mcg/hr for pain management.  Labs:  Labs reviewed.  t bili 5.4.  hbg 7.8. Platelets 52. INR 1.47 and fibrinogen 187.  Goals of Therapy: 0-30 net negative as tolerated with SBP >100    INTERVENTIONS:   None, lines continue to be reversed    PLAN:  Fluid removal per plan of care.  Restart every 72 hours and PRN.  Please contact CRRT resource RN at [phone] with questions and concerns.

## 2019-08-20 NOTE — PROGRESS NOTES
CRRT STATUS NOTE    DATA:  Time:  5:14 PM  Pressures WNL:  YES  Filter Status:  WDL    Problems Reported/Alarms Noted:  None reported since restarting    Supplies Present:  YES    ASSESSMENT:  Patient Net Fluid Balance:  Net Positive 256ml since midnight  Vital Signs:  HR 74, /55 (74), Sats 99% on 40% FiO2 on vent  Labs:  Mg 2.5, Phos 4.6, Hgb 9, Plt 48, INR 1.36, Cr 1.16  Goals of Therapy:  0-100ml/hr net negative as tolerated    INTERVENTIONS:   Checked in with bedside RN. Restarted CVVHDF.    PLAN:  Continue with treatment goals. Call CRRT RN at [phone] with questions and concerns.

## 2019-08-20 NOTE — PROGRESS NOTES
Otolaryngology Progress Note  August 20, 2019     S: No bleeding from the nose or mouth overnight per RN.      O:  /65 (BP Location: Right arm)   Pulse 78   Temp 95.9  F (35.5  C) (Pulmonary Artery)   Resp 20   Wt 108.4 kg (238 lb 15.7 oz)   SpO2 99%   BMI 43.71 kg/m    General: laying supine, sedated  HEENT: Orotracheally intubated. Strip gauze packing in left nasal cavity saturated with old blood, no active bleeding.  Kerlix gauze in oral cavity saturated with old dried blood, no active bleeding. Oral packing removed. Oropharynx clear, no bleeding, no visible laceration. No bleeding in the oral cavity.   Respiratory: mechanically ventilated    Assessment and Plan  Nicci Pemberton is a 59 year old female with a past medical history of ESLD 2/2 ANGULO c/b h/o HE and SBP, HTN, A1AT and iron overload, hypothyroidism who developed diffuse nasal and oropharyngeal bleeding after intubation and NG placement in OR 8/18 for liver transplantation. Unable to identify a specific source of her bleeding. Her coagulopathy related to her liver disease is the primary  of her hemorrhage, there were no identifiable sources of bleeding that are amenable to direct intervention. Her left nasal passage was packed with strip gauze and oropharynx was packed with Afrin soaked kerlix, these will need to be removed prior to extubation. Floseal placed in right nasal passage, this will dissolve on its own.      - recommend correction of coagulopathy as able, much improved today  - Oral packing removed today, plan to remove nasal packing Wednesday if no further bleeding  - Nasal saline spray Q4H to b/l nasal passages  - apply afrin PRN to nasal and oral packing for bleeding  - recommend keflex or other antibiotic to cover staph while packing in place - patient already receiving vanc/zosyn  - recommend avoiding nasal tube placement at this time given recent bleeding. If nasal tube is necessary would wait until nasal packing is  removed tomorrow, use caution with gentle placement on the right side.   - all other cares per primary team  - please contact ENT on call with any questions or concerns     Tosha Ro PA-C  Otolaryngology-Head & Neck Surgery  Please contact ENT by dialing [phone] and entering job code [phone].

## 2019-08-20 NOTE — PROGRESS NOTES
SURGICAL ICU PROGRESS NOTE  August 20, 2019      ASSESSMENT: Nicci Pemberton 59F w/ PMH of ESLD (MELD 39) 2/2 ANGULO and alpha-1-antitrypsin deficiency c/b cirrhosis, SBP, lymphedema, anemia s/p DDLT on 8/18/19, with hypovolemic/hemorrhagic shock.      CHANGES:  1U PRBC  Follow-up ENT re oral packing  Consider different sedation  Remove Schwan Tameka catheter  Hold DDAVP  Discontinue IV fluids     Neurological  # Acute postoperative pain  - Monitor neurological status. Delirium preventions and precautions.   - Pain: fentanyl, no acetaminophen  - Sedation: propofol, titrate to RAAS 0 to -1      # hx of hepatic encephalopathy     Pulmonary  # Post operative ventilatory support  - VENT CMV 16/400/5/40: Continue full vent support. PST when meets criteria. Ventilatory bundle. HOB elevation     Cardiovascular  # Shock - hypovolemic/hemorrhagic  - Goal 100 < SBP >140, 8 < CVP <12  - Monitor hemodynamic status with arterial line  - Fluid and product resuscitation to achieve goal   - off Norepinephrine gtt for goal MAPs >65  - Dobutamine stress test 6/19/2019 negative for ischemia     # hx of HTN  - PTA spironolactone 100 mg BID, bumetanide 1 mg   - Hold while pressures soft     Gastroenterology/Nutrition  # ESLD 2/2 Alpha-1-antitrypsin and ANGULO s/p DDLT 8/18/19    # heterozygous (MZ genotype) for alpha-1-antitrypsin with secondary iron overload  # hyperbilirubinemia  # umbilical hernia  - MELD at transplant: 39  - most recent EGD 5/7/2019 without esophageal or gastric varices  - Q12h CMP, CBC  - repeat U/S liver txp     # Protein calorie deficit malnutrition   - No indication for parenteral nutrition.  - trickle tube feeds     - PTA famotidine 20 mg BID     Fluids/Electrolytes  - discontinue D5 1/2NS @125 for IV fluid hydration, but consider restarting if hemodynamically stable     # hyponatremia  # hypocalcemia  - ICU electrolyte replacement protocol     Renal  # acute kidney injury, baseline Cr ~0.6  # history of hepatorenal  syndrome  - Monitor intake and output.  - Goal UOP >0.50 ml/kg/hr  - CRRT - remove fluids per nephrology  - Dialyzed intraoperatively through left internal jugular line, may require further dialysis pending progression of kidney function overnight     Endocrine  # Stress and steroid hyperglycemia   - Insulin gtt for glucose management. Goal to keep BG< 180 for optimal wound healing      # hx of hypothyroidism  - PTA levothyroxine 125 mcg daily     ID  # Immunosuppression  - Continue zosyn, vanco, micafungin   - Continue ppx: valcyte  - continue immunosuppression: methylprednisolone, mycophenolate, tacrolimus (initiated 8/19/2019)    - f/up cultures     # SBP  - Rifaximin discontinued (8/19/2019)     Heme  # Acute blood loss anemia, EBL 5L  # thrombocytopenia  # coagulopathy  - Transfuse if hgb <8.0 or signs/symptoms of hypoperfusion  - Goal INR <2, Fibrinogen >200, plt >[id number]  - Q4h hgb, PCT, fibrinogen, INR, lactate, ABG  - type and screen q72h  - hold DDAVP  - TEG     Musculoskeletal  # weakness and deconditioning of critical illness   # osteoarthritis  - Physical and occupational therapy consult when extubated     General Cares/Prophylaxis  DVT Prophylaxis: Pneumatic Compression Devices  GI Prophylaxis: pepcid BID   Restraints: No   Lines/ tubes/ drains:  - ETT  - L abd JPx1  - R abd OBED x1  - R internal jugular CVC  - L radial arterial line  - PIVx2  - Forrester  Code: Full  Disposition: Surgical ICU     Patient seen, findings and plan discussed with surgical ICU staff, Dr. Bolton.  Malachi Paredes MD   SICU Resident   ====================================    TODAY'S PROGRESS:   SUBJECTIVE:   Patient off pressors, but required transfusion with 2U PRBC 1U Cryo 1U plts over night to meet goals. She remains hemodynamically stable. Liver enzymes and bilirubin trending down now.      OBJECTIVE:   1. VITAL SIGNS:   Temp:  [97.2  F (36.2  C)-98.8  F (37.1  C)] 97.5  F (36.4  C)  Heart Rate:  [67-99] 67  Resp:  [16]  16  MAP:  [65 mmHg-91 mmHg] 81 mmHg  Arterial Line BP: (102-138)/(46-68) 125/61  FiO2 (%):  [40 %-50 %] 40 %  SpO2:  [97 %-100 %] 100 %  Ventilation Mode: CPAP/PS  (Continuous positive airway pressure with Pressure Support)  FiO2 (%): 40 %  Rate Set (breaths/minute): 16 breaths/min  Tidal Volume Set (mL): 400 mL  PEEP (cm H2O): 5 cmH2O  Pressure Support (cm H2O): 7 cmH2O  Oxygen Concentration (%): 40 %  Resp: 16      2. INTAKE/ OUTPUT:   I/O last 3 completed shifts:  In: 7963.91 [I.V.:4516.91; Other:52; NG/GT:330]  Out: 3148 [Urine:57; Emesis/NG output:50; Drains:2355; Other:686]    3. PHYSICAL EXAMINATION:   General:  Neuro: Sedated, NAD  Resp: Intubated  CV: RRR  Abdomen: Soft, Non-distended, Non-tender  Incisions: covered with dressing  Extremities: anasarca    4. INVESTIGATIONS:   Arterial Blood Gases   Recent Labs   Lab 08/20/19  1018 08/20/19  0326 08/19/19 2136 08/19/19  1705   PH 7.40 7.41 7.41 7.44   PCO2 39 39 40 34*   PO2 133* 103 99 104   HCO3 24 25 25 23     Complete Blood Count   Recent Labs   Lab 08/20/19  1018 08/20/19  0326 08/19/19 2136 08/19/19  1555   WBC 7.5 7.5 7.7 9.1   HGB 8.9* 7.8* 7.2* 8.3*   PLT 55* 52* 71* 73*     Basic Metabolic Panel  Recent Labs   Lab 08/20/19  0326 08/19/19  2136 08/19/19  1555 08/19/19  1206    140 141 140   POTASSIUM 4.5 4.7 4.5 4.5   CHLORIDE 107 106 108 105   CO2 25 24 22 23   BUN 22 20 19 22   CR 1.16* 1.28* 1.19* 1.22*   * 153* 134* 142*     Liver Function Tests  Recent Labs   Lab 08/20/19  0326 08/19/19  2136 08/19/19  1555 08/19/19  1206  08/19/19  0748   * 593* 715* 738*   < >  --    * 492* 528* 520*   < >  --    ALKPHOS 72 71 68 62   < >  --    BILITOTAL 5.4* 6.5* 7.7* 8.1*   < >  --    ALBUMIN 2.2* 2.3* 2.3* 2.6*   < >  --    INR 1.47* 1.57* 1.60*  --   --  1.77*    < > = values in this interval not displayed.     Pancreatic Enzymes  Recent Labs   Lab 08/19/19  0330 08/17/19  2341 08/16/19  1119   LIPASE 2,509*  --  364    AMYLASE 326* 45  --      Coagulation Profile  Recent Labs   Lab 08/20/19  0326 08/19/19  2136 08/19/19  1555 08/19/19  0748  08/18/19  2100  08/18/19  1758  08/18/19  1605  08/17/19  2341   INR 1.47* 1.57* 1.60* 1.77*   < > 1.88*   < > 2.79*   < > 3.38*   < > 3.64*   PTT  --   --   --   --   --  42*  --  103*  --  181*  --  66*    < > = values in this interval not displayed.         5. RADIOLOGY:   No results found for this or any previous visit (from the past 24 hour(s)).    =========================================      @[facility]@

## 2019-08-20 NOTE — PROVIDER NOTIFICATION
FAMILIA Cruz notifed hgb 7.8 & plts 52 this AM. Fibrinogen 187. VSS. MD verbalized understanding & MD to put in orders for product as needed.     Lauren Aguirre RN on 8/20/2019 at 3:54 AM

## 2019-08-20 NOTE — ANESTHESIA POSTPROCEDURE EVALUATION
Anesthesia POST Procedure Evaluation    Patient: Nicci Pemberton   MRN:     [id number] Gender:   female   Age:    59 year old :      1960        Preoperative Diagnosis: End Stage Liver Disease   Procedure(s):  TRANSPLANT, LIVER, RECIPIENT,  DONOR  BACKBENCH PREPARATION, LIVER   Postop Comments: No value filed.       Anesthesia Type:  Not documented  General    Reportable Event: NO     PAIN:        Neuro/Psych: Uneventful perioperative course   Sign Out Status: Planned Postop Sedation     Airway/Resp.: Uneventful perioperative course   Sign Out Status: Airway Device resent     Airway Device: ETT                 Reason: Planned (pre-op)     CV: Uneventful perioperative course   Sign Out status: Appropriate HR/Rhythm; Appropriate BP and perfusion indices     Disposition:   Sign Out in:  ICU  Disposition:  ICU  Recovery Course: Recovery in ICU  Follow-Up: Not required           Last Anesthesia Record Vitals:  CRNA VITALS  2019 - 2019             ART BP:  102/78    ART Mean:  75    Ht Rate:  78          Last PACU Vitals:  Vitals Value Taken Time   BP     Temp     Pulse     Resp     SpO2 100 % 2019 11:34 AM   Temp src     NIBP     Pulse     SpO2     Resp     Temp     Ht Rate     Temp 2     Vitals shown include unvalidated device data.      Electronically Signed By: Adi Aguilera MD, 2019, 11:34 AM

## 2019-08-20 NOTE — PROGRESS NOTES
Immunosuppression Note:    Nicci Pemberton is a 59 year old female who is seen today  for immunosuppression management     I, Mandeep Alford MD, I have examined the patient with the resident/PA/Fellow, discussed and agree with the note and findings.  I have reviewed today's vital signs, medications, labs and imaging. I reviewed the immunosuppression medications and levels. 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 Lactate has normalized. Repeat US showed good flow.   Immunosuppression management:  mg BID, tac 1 mg BID. Solu-medrol taper as ordered. Will start checking tac levels tomorrow AM. Complexity of management:Medium. Contributing factors: anemia and induction  Hematology: Transfusing 3 units overnight, transfusion goal hgb > 8 at this time. Will keep plt goal >100 while epistaxis is a concern, then may reduce goal.  Nasal/oral packing still in place per ENT- plans to remove today.  Cardiorespiratory: Pressors off. Hx of hypertension on spironolactone and bumex at home, will hold off at this time.  GI/Nutrition: Will start trickle tube feeds today, no plans to advance until feeding tube is post pyloric.  Endocrine: Steroid induced hyperglycemia, on insulin gtt  Fluid/Electrolytes: MIVF: GAMAL- will continue CRRT at this time, plan to remove some fluid today as able  : Forrester to remain due to critical status/close monitoring of Is and Os  Infectious disease: Ppx with vanc/zosyn/micafungin  Prophylaxis: DVT, fall, GI, fungal  Disposition: 4A, appreciate critical cares per SICU team    Medical Decision  Making: Medium  Subsequent visit 06504 (moderate level decision making)    VEENA/Fellow/Resident Provider: Mray Braxton PA-C     Faculty: Mandeep Alford M.D.    __________________________________________________________________  Transplant History: Admitted 8/16/2019 for acute decompensated ANGULO.   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    8/18/2019 (Liver), Postoperative day: 2     Interval History: Unable to obtain a history from the patient due to critical condition, intubation and sedation  Overnight events: POD 2 after liver transplant, transfused 3 units pRBCs overnight, no active sources of bleeding    ROS:   A 10-point review of systems was negative except as noted above.    Curent Meds:    aspirin  325 mg Oral Daily     [START ON 8/22/2019] basiliximab (SIMULECT) infusion  20 mg Intravenous Once     carboxymethylcellulose PF  2 drop Both Eyes Q6H     famotidine  20 mg Oral BID     levothyroxine  125 mcg Oral Daily     [START ON 8/21/2019] methylPREDNISolone  50 mg Intravenous Once    Followed by     [START ON 8/22/2019] predniSONE  25 mg Oral or Feeding Tube Once    Followed by     [START ON 8/23/2019] predniSONE  10 mg Oral or Feeding Tube Once     micafungin  100 mg Intravenous Q24H     multivitamins w/minerals  15 mL Per Feeding Tube Daily     mycophenolate  750 mg Oral BID IS    Or     mycophenolate  750 mg Oral or NG Tube BID IS     piperacillin-tazobactam  4.5 g Intravenous Q6H     protein modular  1 packet Per Feeding Tube TID     sodium chloride  2 spray Both Nostrils Q6H     sodium chloride (PF)  3 mL Intravenous Q8H     sodium chloride (PF)  3 mL Intracatheter Q8H     tacrolimus  1 mg Per Feeding Tube BID IS     valGANciclovir  450 mg Oral Every Other Day    Or     valGANciclovir  450 mg Oral or NG Tube Every Other Day     vitamin D3  1,000 Units Oral BID       Physical Exam:     Admit Weight: 104.3 kg (230 lb)    Current Vitals:   /65  (BP Location: Right arm)   Pulse 78   Temp 97.5  F (36.4  C) (Pulmonary Artery)   Resp 16   Wt 109 kg (240 lb 4.8 oz)   SpO2 100%   BMI 43.95 kg/m      CVP (mmHg): 5 mmHg    Vital sign ranges:    Temp:  [97.2  F (36.2  C)-98.8  F (37.1  C)] 97.5  F (36.4  C)  Heart Rate:  [67-99] 68  Resp:  [16] 16  MAP:  [65 mmHg-91 mmHg] 79 mmHg  Arterial Line BP: (102-138)/(46-68) 122/58  FiO2 (%):  [40 %] 40 %  SpO2:  [97 %-100 %] 100 %  Patient Vitals for the past 24 hrs:   BP Temp Temp src Heart Rate Resp SpO2 Weight   08/20/19 1100 -- -- -- 68 -- 100 % --   08/20/19 1000 -- -- -- 67 -- 100 % --   08/20/19 0916 -- -- -- 74 -- -- --   08/20/19 0915 -- -- -- 75 -- -- --   08/20/19 0910 -- 97.5  F (36.4  C) Pulm Art 74 -- 100 % --   08/20/19 0900 -- 97.5  F (36.4  C) Pulm Art 75 16 100 % --   08/20/19 0800 -- -- -- 70 16 100 % --   08/20/19 0700 -- 97.3  F (36.3  C) Pulm Art 70 16 100 % --   08/20/19 0645 -- 97.3  F (36.3  C) Pulm Art 71 -- 100 % --   08/20/19 0630 -- 97.2  F (36.2  C) Pulm Art 69 -- 100 % --   08/20/19 0615 -- -- -- 71 -- 100 % --   08/20/19 0600 -- 97.2  F (36.2  C) Pulm Art 69 16 100 % --   08/20/19 0545 -- 97.2  F (36.2  C) Pulm Art 70 -- 100 % --   08/20/19 0530 -- -- -- 72 -- -- --   08/20/19 0515 -- -- -- 72 -- 100 % --   08/20/19 0500 -- -- -- 74 16 100 % --   08/20/19 0445 -- 97.2  F (36.2  C) Pulm Art 73 -- 100 % --   08/20/19 0430 -- 97.2  F (36.2  C) -- 75 -- 100 % --   08/20/19 0415 -- 97.2  F (36.2  C) -- 74 -- 100 % --   08/20/19 0400 -- 97.2  F (36.2  C) Pulm Art 73 16 100 % --   08/20/19 0345 -- -- -- 76 -- 100 % --   08/20/19 0330 -- -- -- 77 -- 100 % --   08/20/19 0315 -- -- -- 73 -- 100 % --   08/20/19 0300 -- -- -- 74 16 100 % 109 kg (240 lb 4.8 oz)   08/20/19 0245 -- -- -- 76 -- 100 % --   08/20/19 0230 -- -- -- 73 -- 100 % --   08/20/19 0215 -- -- -- 73 -- 100 % --   08/20/19 0200 -- -- -- 75 16 100 % --   08/20/19 0145 -- -- -- 75 -- 100 % --   08/20/19 0130 -- -- -- 76 -- 100 % --    08/20/19 0115 -- -- -- 77 -- 100 % --   08/20/19 0100 -- -- -- 76 16 100 % --   08/20/19 0045 -- -- -- 75 -- 100 % --   08/20/19 0030 -- 97.3  F (36.3  C) Pulm Art 75 -- 100 % --   08/20/19 0015 -- -- -- 79 -- 99 % --   08/20/19 0000 -- 97.3  F (36.3  C) Pulm Art 75 16 100 % --   08/19/19 2345 -- -- -- 74 -- 100 % --   08/19/19 2330 -- -- -- 83 -- 100 % --   08/19/19 2315 -- 97.3  F (36.3  C) Pulm Art 76 -- 100 % --   08/19/19 2300 -- 97.3  F (36.3  C) Pulm Art 77 16 100 % --   08/19/19 2245 -- -- -- 77 -- 100 % --   08/19/19 2230 -- -- -- 78 -- 100 % --   08/19/19 2215 -- -- -- 80 -- 100 % --   08/19/19 2200 -- 97.9  F (36.6  C) Pulm Art 78 16 100 % --   08/19/19 2145 -- -- -- 76 -- 100 % --   08/19/19 2130 -- -- -- 79 -- 100 % --   08/19/19 2115 -- -- -- 78 -- 100 % --   08/19/19 2100 -- -- -- 91 16 100 % --   08/19/19 2000 -- 98.1  F (36.7  C) Pulm Art 94 16 99 % --   08/19/19 1945 -- -- -- 95 -- 100 % --   08/19/19 1930 -- -- -- 96 -- 99 % --   08/19/19 1915 -- -- -- 97 -- 99 % --   08/19/19 1900 -- 98.2  F (36.8  C) Pulm Art 99 16 99 % --   08/19/19 1845 -- -- -- 96 -- 99 % --   08/19/19 1830 -- -- -- 94 -- 99 % --   08/19/19 1815 -- -- -- 95 -- 99 % --   08/19/19 1813 -- 98.2  F (36.8  C) -- 94 -- 99 % --   08/19/19 1803 -- 98.2  F (36.8  C) Pulm Art 91 -- 99 % --   08/19/19 1800 -- -- -- 90 -- 99 % --   08/19/19 1745 -- -- -- 92 -- 99 % --   08/19/19 1730 -- -- -- 95 -- 99 % --   08/19/19 1715 -- -- -- 96 -- 99 % --   08/19/19 1700 -- -- -- 96 16 99 % --   08/19/19 1645 -- -- -- 96 -- 99 % --   08/19/19 1630 -- -- -- 98 -- 99 % --   08/19/19 1615 -- -- -- 96 -- 99 % --   08/19/19 1600 -- 98.8  F (37.1  C) -- 95 16 99 % --   08/19/19 1545 -- -- -- 97 -- 99 % --   08/19/19 1530 -- -- -- 95 -- 98 % --   08/19/19 1525 -- -- -- 95 -- 98 % --   08/19/19 1515 -- -- -- 94 -- 98 % --   08/19/19 1500 -- -- -- 94 16 98 % --   08/19/19 1445 -- -- -- 84 -- 98 % --   08/19/19 1430 -- -- -- 97 -- 98 % --   08/19/19 1415  -- -- -- 88 -- 98 % --   08/19/19 1400 -- -- -- 94 16 98 % --   08/19/19 1345 -- -- -- 90 -- 98 % --   08/19/19 1330 -- -- -- 91 -- 98 % --   08/19/19 1315 -- -- -- 93 -- 97 % --   08/19/19 1300 -- -- -- 86 16 97 % --   08/19/19 1245 -- -- -- 95 -- 97 % --     General Appearance: intubated, sedated   Skin: normal, warm  Heart: regular rate and rhythm  Lungs: intubated  Abdomen: soft, nondistended, incision covered  : Forrester catheter in place, scant yellow urine  Extremities: edema: present bilaterally. 3+.  Neurologic: sedated    Frailty Scores     There is no flowsheet data to display.          Data:   CMP  Recent Labs   Lab 08/20/19  1018 08/20/19  0326 08/19/19  2136  08/19/19  0330  08/17/19  2341  08/16/19  1119   NA  --  141 140   < > 137   < > 129*   < > 126*   POTASSIUM  --  4.5 4.7   < > 4.2   < > 4.5   < > 5.0   CHLORIDE  --  107 106   < > 105   < > 97   < > 94   CO2  --  25 24   < > 18*   < > 23   < > 25   GLC  --  152* 153*   < > 173*   < > 91   < > 82   BUN  --  22 20   < > 27   < > 45*   < > 40*   CR  --  1.16* 1.28*   < > 1.33*   < > 2.04*   < > 2.30*   GFRESTIMATED  --  51* 46*   < > 44*   < > 26*   < > 22*   GFRESTBLACK  --  60* 53*   < > 51*   < > 30*   < > 26*   JACOB  --  7.5* 7.8*   < > 9.3   < > 8.4*   < > 8.2*   ICAW  --  4.6 4.7   < >  --    < >  --   --   --    MAG 2.5* 2.5* 2.5*   < > 2.2   < > 2.5*  --   --    PHOS 4.6* 4.9* 5.3*   < > 4.4   < > 3.8  --   --    AMYLASE  --   --   --   --  326*  --  45  --   --    LIPASE  --   --   --   --  2,509*  --   --   --  364   ALBUMIN  --  2.2* 2.3*   < > 2.3*   < > 3.4   < > 1.6*   BILITOTAL  --  5.4* 6.5*   < > 8.9*   < > 26.9*   < > 24.2*   ALKPHOS  --  72 71   < > 46   < > 87   < > 152*   AST  --  522* 593*   < > 342*   < > 75*   < > Canceled, Test credited   ALT  --  487* 492*   < > 238*   < > 34   < > 51*    < > = values in this interval not displayed.     CBC  Recent Labs   Lab 08/20/19  1018 08/20/19  0326  08/18/19  2100   HGB 8.9* 7.8*   <  > 12.0   WBC 7.5 7.5   < > 8.4   PLT 55* 52*   < > 142*   A1C  --   --   --  5.0    < > = values in this interval not displayed.     COAGS  Recent Labs   Lab 08/20/19  1018 08/20/19  0326  08/18/19  2100  08/18/19  1758   INR 1.42* 1.47*   < > 1.88*   < > 2.79*   PTT  --   --   --  42*  --  103*    < > = values in this interval not displayed.      Urinalysis  Recent Labs   Lab Test 08/16/19  1755 06/18/18  1019   COLOR Dark Yellow Yellow   APPEARANCE Clear Clear   URINEGLC Negative Negative   URINEBILI Moderate* Negative   URINEKETONE Negative Negative   SG 1.007 1.014   UBLD Negative Negative   URINEPH 5.5 8.0*   PROTEIN Negative Negative   NITRITE Negative Negative   LEUKEST Negative Small*   RBCU 0 1   WBCU 1 8*   UTPG  --  Unable to calculate due to low value     Virology:  Hepatitis C Antibody   Date Value Ref Range Status   08/18/2019 Nonreactive NR^Nonreactive Final     Comment:     Assay performance characteristics have not been established for newborns,   infants, and children

## 2019-08-20 NOTE — PROVIDER NOTIFICATION
SICU MD Cruz updated latest hgb 7.2 & plts 71. Goal hgb >8 & plts>100 D/w MD pt has received multiple units of plts throughout day w/ no to minimal increase in plts & writer inquired if want to decrease plt goal. MD verbalized understanding & MD to put in orders as needed.     Lauren Aguirre RN on 8/19/2019 at 10:04 PM

## 2019-08-20 NOTE — OP NOTE
Transplant Center   Operative Note     Procedure date:  19    Preoperative diagnosis:  End Stage Liver Disease due to nonalcoholic steatopheatitis    Postoperative diagnosis:  Same    Procedure:  1. liver transplant  2. Adhesion lysis > 60 min  3. Complex reconstruction of the accessory right hepatic artery, anastamosis to GDA            Surgeon:  Surgeon(s) and Role:     * Mandeep Alford MD - Primary     * Renee Allen MD - Fellow - Assisting    Fellow/assistant:   NEIDA Allen MD, fellow,  There was no qualified resident to assist with this procedure    Anesthesia:  General    Specimen:  Native Liver, Donor Gallbladder    Drains:  OBED x 2    Urine output:  550 mls    Estimated blood loss:  5,000    Fluids administered:    Fluid Amount   Crystalloid (mL) 8,000   RBC (Units) 14   FFP (Units) 13   Cryoprecipitate (Units)  4   Platelets (Units) 10   Cell Saver (CCs) 3,600        Complications: None    Indication: Nicci Pemberton with a history of End Stage Liver Disease due to nonalcoholic steatopheatitis who presents for  - Brain Death Whole Liver transplant. A suitable donor offer has become available. After discussing the risks and benefits of proceeding, the patient agreed to proceed with surgery and provided informed consent.    Final ABO/Crossmatch verification: After the donor organ arrived to the operating room and prior to anastomosis, I participated in the transplant pre-verification upon organ receipt timeout by visually verifying the donor ID, organ and laterality, donor blood type, recipient unique identifier, recipient blood type, and that the donor and recipient are blood type compatible.     Donor UNOS ID:  [id number]    Donor ABO:  A    Donor arterial clamp on:  2019  8:57 AM    Preservation fluid:  UW     Vessels with organ:  Yes    Donor organ arrival to recipient room:  2019 12:20 PM    Total ischemic time:  370    Cold ischemic time:  335    Warm ischemic time:   35    Ex-vivo:  No    Time placed on Ex-vivo perfusion:      Total time on Ex-vivo perfusion:  n/a      Back Table Preparation:   Procedure:  Bench preparation of the liver allograft for transplantation    Preoperative diagnosis:  End Stage Liver Disease due to nonalcoholic steatopheatitis    Postoperative diagnosis:  Same    Surgeon:  Surgeon(s) and Role:     * Mandeep Alford MD - Primary     * Renee Allen MD - Fellow - Assisting    Co-Surgeon:  Mandeep Alford M.D.    Fellow/Assistant:  NEIDA Allen MD, fellow,  There was no qualified resident to assist with this procedure    Anesthesia:  None    Graft biopsy:  No    Macroscopic steatosis:  Mild    Back table reconstruction:  No Reconstruction    Intimal flap repair:  none    # of hepatic arteries:  2    # of portal veins:  1    Accessory arteries:  Yes    # of hepatic veins:  3    # of bile ducts:  1    Graft weight:       Findings:   Liver Laceration: No  Overall quality of liver: Good    Back Table Procedure: The liver allograft was received and inspected and the aforementioned findings were noted. It was flushed with heparinized. The donor liver was placed in fresh ice-cold preservation solution. We identified the inferior vena cava. Two stay sutures were placed on the supra-hepatic portion of the cava. Two stay sutures were placed on the infra-hepatic portion of the cava. The fibro-fatty tissue and adrenal gland was cleared of inferior vena cava. The phrenic vein was ligated. The adrenal vein was ligated. The IVC was tested for leaks by using a bulb syringe. We suture ligated all identified leaks. The portal vein was identified. All the fibro-fatty area or tissue around the portal vein was removed and the portal vein was dissected up to its bifurcation. An 8-Polish cannula was placed in the portal vein and fixed with a stitch. The portal vein was tested for leaks. We suture ligated all identified leaks. The cannula was left in place to be  used for flushing the liver at the time of implantation. The hepatic artery anatomy was identified. The celiac axis  was traced all the way from the aortic patch to the level of the gastro-duodenal artery. Dissection was stopped at the level of the gastro-duodenal artery. All the leaks in the hepatic artery tributaries were suture ligated. The bile duct was inspected and flushed. Arterial reconstruction was done by anastomosing the donor replaced right hepatic artery and GDA in an end to side fashion with prolene suture under loupe magnification. This was done without a preserved  Carrel patch.,  and Bile duct reconstruction was done by incising the distal bile ducts and anastomosing the mucosa together in a side to side fashion using absorbable suture under loupe magnification to create a common orifice prior to implantation.. The liver was placed back in ice-cold preservation solution until ready for transplantation. Faculty was present for the critical portions of the procedure.    Operative Procedure:   Arterial anastomosis start:  8/18/2019  2:32 PM    Recipient arterial unclamp:  8/18/2019  3:07 PM    Extra vessels used:  no    Extra vessels banked:  Yes    Previous upper abd surgery:  No    Previous cholecystectomy?  No    Portal vein:  Thrombus: No   Patent: Yes     On portal bypass:  Yes    Arterial flow:  Sufficient: Yes , there was an accessory right hepatic artery which was anastomosed to the GDA and it lies posterior to the bile duct    Bile duct anastomosis:  To bowel: No     To duct: Yes the donor bile duct was about 7 mm in the recipient bile duct was about 7 mm.  We performed an end-to-end anastomosis using interrupted 6-0 PDS over 8 Urdu feeding tube stent.       Nicci Pemberton was brought to the operating room, placed in a supine position, and a time out was performed. Sequential compression devices were placed on both lower extremities and general endotracheal anesthesia was induced. The  patient was given IV antibiotics, and Solumedrol. A Forrester catheter was placed. A central line was placed by Anesthesia service. The abdomen was then shaved, prepped, and draped in the usual sterile fashion.  The backtable preparation occurred prior to implantation.    We entered the abdominal cavity using Vertical Extension (Felicitas) incision. The abdomen was examined. There were adhesions to the gallbladder which were taken down with electrocautery.  There were significant adhesions of the colon to the right lobe and we spent greater than 60 minutes doing additional lysis.  We proceeded to mobilization of the liver first by dividing falciform ligament, next dividing the triangular ligaments and mobilizing the left lobe with further evaluation of the right lobe of the liver. Next the right lobe of the liver was mobilized. We elected to proceed with a hilar dissection. The right and left hepatic arteries were identified, ligated and divided, followed by ligation and division of the common bile duct. The portal vein was cleared from tissue and small branches were tied off and divided. The left and right portal veins were separately ligated and the portal vein was divided. At this point we went on the bypass with bypass flow of 1 liter per minute. Next, we continued mobilizing the right lobe. The adhesions between the right lobe and the right diaphragm were divided and the lobe was mobilized up to the vena cava. The hepatic veins were identified. Next, we dissected out the caudate lobe and infrahepatic IVC.     We applied Infrahepatic and suprahepatic clamps to the IVC and the liver was excised with curved Blakely scissors. The recipient's liver was passed off from the operating table. Next, complete hemostasis was obtained. The donor liver was brought into the field. The suprahepatic IVC anastomosis was constructed with 3-0 Prolene followed by the construction of infrahepatic anastomosis with 4-0 Prolene. Next, we came  off the bypass and end-to-end portal anastomosis was constructed between the donor and recipient portal veins using 6-0 Prolene. During the construction of the infrahepatic IVC anastomosis, the liver was flushed with 5% albumin. Once the portal anastomosis was constructed, the liver was reperfused. Complete hemostasis was obtained and arterial anastomosis was performed between the donor celiac trunk patch using Mason technique and the bifurcation of the right and left recipient hepatic arteries. After clamps were released, there was a good flow within the donor artery.  The accessory right hepatic artery was anastomosed to the GDA of the donor.  This anastomosis was done using interrupted 7-0 Prolene sutures.  After that again, all the arterial branches that were bleeding were ligated and complete hemostasis was obtained. After the anastomosis was performed, good flow was re-established, hemostasis was obtained. Next, the biliary anastomosis was performed using a 6-0 running PDS suture over the stent.     Next again the abdomen was irrigated and hemostasis was obtained. We closed the abdomen with 1 PDS in a 2 layer fashion and skin was closed with vertical mattress sutures of 2-O nylon. All needle, sponge and instrument counts were correct x 2. Faculty was present for key portions of the procedure. The patient was  transferred to the ICU for post-op monitoring.    Physician Attestation   I was present for the key portions of the procedure and I was immediately available for the entire procedure between opening and closing.    Mandeep Alford  Date of Service (when I saw the patient): 8/18/2019

## 2019-08-20 NOTE — PLAN OF CARE
NEURO:sedated; not following commands; withdraws in all extrems; occasionally spontaneously moves upper extrems  CV:SR 70s-80s; rare pvcs; levo remains off & sbp>100 w/o issue, MAP consistently >65 w/o issue  CVP 10; PA 20s/10s; CO & CI WNL; pitting edema, afebrile; bear hugger & blood warmer remain in place  RESP:CMV 16/400/40%/PEEP 8; small amount gómez secretions; lungs clear/diminished bases; ABG WNL  GI:trickle feeds via OG @ 10 ml/hr; titrating insulin gtt to achieve glycemic control MIVF continues @ 125 ml/hr  :CRRT net goal 0-30mls if SBP>100, able to remove some fluid-see output; minimal UOP via durbin cath  GTTs: insulin gtt; d5 w/ 1/2  ml/hr (d/w SICU if want to adjust IVF given edema); fentanyl 50/hr; propofol 10/hr     SHIFT EVENTS:  -d/w SICU high output from L OBED but VSS, & output serosang  -mouth & nasal cavity remains packed-moistening per order/reccomendation by ENT  -per SICU plt goal >50 overnight & will discuss plt goal w/ day team  -see writer previous notes for more details-total of 2 units PRBCS; 1 unit PLTs, 1 Unit cryo ordered     Please see MAR/flowsheets for further interventions/assessments     PLAN:Continue to monitor pt & notify MD of any acute changes    Lauren Aguirre RN on 8/20/2019 at 6:05 AM

## 2019-08-20 NOTE — PLAN OF CARE
Nursing Progress Note    D/I/A:  Neuro: Fentanyl gtt at 50 mcg/hr. Opens eyes to voice. Follows commands, slow to respond. PERRL. Weak hand , equal bilaterally. Wiggles toes bilaterally.   CV: NSR, no ectopy. BP stable, no interventions. Afebrile. Radial pulses palpable. Pedal pulses dopplerable.  Pulm: LS clear, diminished at bases. CMV/AC Rate 16,  mL, PEEP 5, FiO2 35%. CPAP/PS attempted at 1600, inadequate TV.  GI/: Abd incision CDI. See I/O Flowsheets for OBED drain output, L drain output > R drain output. TF at 10mL/hr, no advancement at this time. Insulin gtt maintained. Forrester removed per order, to bladder scan qshift.  CRRT: 0-100mL/hr net negative, currently meeting goal. Circuit changed this shift.    [place] removed. RIJ removed.    P: Continue to monitor and assess. Update POC as needed.    /65 (BP Location: Right arm)   Pulse 78   Temp 98.2  F (36.8  C) (Axillary)   Resp 16   Wt 109 kg (240 lb 4.8 oz)   SpO2 100%   BMI 43.95 kg/m    Nupur Zambrano RN  August 20, 2019  6:28 PM

## 2019-08-20 NOTE — PLAN OF CARE
4A - Pt continued to have bleeding in nasal/oral cavities, sedated/intubated and not medically appropriate for therapy

## 2019-08-21 LAB
ALBUMIN SERPL-MCNC: 2.2 G/DL (ref 3.4–5)
ALBUMIN SERPL-MCNC: 2.2 G/DL (ref 3.4–5)
ALBUMIN SERPL-MCNC: 2.3 G/DL (ref 3.4–5)
ALBUMIN SERPL-MCNC: 2.3 G/DL (ref 3.4–5)
ALP SERPL-CCNC: 100 U/L (ref 40–150)
ALP SERPL-CCNC: 119 U/L (ref 40–150)
ALP SERPL-CCNC: 120 U/L (ref 40–150)
ALP SERPL-CCNC: 95 U/L (ref 40–150)
ALT SERPL W P-5'-P-CCNC: 399 U/L (ref 0–50)
ALT SERPL W P-5'-P-CCNC: 400 U/L (ref 0–50)
ALT SERPL W P-5'-P-CCNC: 422 U/L (ref 0–50)
ALT SERPL W P-5'-P-CCNC: 428 U/L (ref 0–50)
ANGLE RATE OF CLOT STRENGTH: 58.8 DEGREES (ref 53–72)
ANION GAP SERPL CALCULATED.3IONS-SCNC: 6 MMOL/L (ref 3–14)
ANION GAP SERPL CALCULATED.3IONS-SCNC: 6 MMOL/L (ref 3–14)
ANION GAP SERPL CALCULATED.3IONS-SCNC: 7 MMOL/L (ref 3–14)
ANION GAP SERPL CALCULATED.3IONS-SCNC: 9 MMOL/L (ref 3–14)
ANISOCYTOSIS BLD QL SMEAR: ABNORMAL
ANISOCYTOSIS BLD QL SMEAR: ABNORMAL
ANISOCYTOSIS BLD QL SMEAR: SLIGHT
ANISOCYTOSIS BLD QL SMEAR: SLIGHT
AST SERPL W P-5'-P-CCNC: 272 U/L (ref 0–45)
AST SERPL W P-5'-P-CCNC: 274 U/L (ref 0–45)
AST SERPL W P-5'-P-CCNC: 276 U/L (ref 0–45)
AST SERPL W P-5'-P-CCNC: 281 U/L (ref 0–45)
BACTERIA SPEC CULT: NO GROWTH
BASE DEFICIT BLDA-SCNC: 0.1 MMOL/L
BASE EXCESS BLDA CALC-SCNC: 0.1 MMOL/L
BASE EXCESS BLDA CALC-SCNC: 0.5 MMOL/L
BASE EXCESS BLDA CALC-SCNC: 0.8 MMOL/L
BASOPHILS # BLD AUTO: 0 10E9/L (ref 0–0.2)
BASOPHILS NFR BLD AUTO: 0 %
BILIRUB DIRECT SERPL-MCNC: 3.8 MG/DL (ref 0–0.2)
BILIRUB SERPL-MCNC: 5.3 MG/DL (ref 0.2–1.3)
BILIRUB SERPL-MCNC: 5.3 MG/DL (ref 0.2–1.3)
BILIRUB SERPL-MCNC: 5.6 MG/DL (ref 0.2–1.3)
BILIRUB SERPL-MCNC: 5.6 MG/DL (ref 0.2–1.3)
BLD PROD TYP BPU: NORMAL
BLD UNIT ID BPU: 0
BLD UNIT ID BPU: 0
BLOOD PRODUCT CODE: NORMAL
BLOOD PRODUCT CODE: NORMAL
BPU ID: NORMAL
BPU ID: NORMAL
BUN SERPL-MCNC: 22 MG/DL (ref 7–30)
BUN SERPL-MCNC: 24 MG/DL (ref 7–30)
BUN SERPL-MCNC: 26 MG/DL (ref 7–30)
BUN SERPL-MCNC: 28 MG/DL (ref 7–30)
BURR CELLS BLD QL SMEAR: SLIGHT
CA-I BLD-MCNC: 4.5 MG/DL (ref 4.4–5.2)
CA-I BLD-MCNC: 4.5 MG/DL (ref 4.4–5.2)
CA-I BLD-MCNC: 4.6 MG/DL (ref 4.4–5.2)
CA-I BLD-MCNC: 4.6 MG/DL (ref 4.4–5.2)
CALCIUM SERPL-MCNC: 7.6 MG/DL (ref 8.5–10.1)
CALCIUM SERPL-MCNC: 7.8 MG/DL (ref 8.5–10.1)
CALCIUM SERPL-MCNC: 7.8 MG/DL (ref 8.5–10.1)
CALCIUM SERPL-MCNC: 7.9 MG/DL (ref 8.5–10.1)
CHLORIDE SERPL-SCNC: 106 MMOL/L (ref 94–109)
CHLORIDE SERPL-SCNC: 107 MMOL/L (ref 94–109)
CI HYPOCOAGULATION INDEX: 0.6 RATIO (ref 0–3)
CO2 SERPL-SCNC: 24 MMOL/L (ref 20–32)
CO2 SERPL-SCNC: 26 MMOL/L (ref 20–32)
CO2 SERPL-SCNC: 27 MMOL/L (ref 20–32)
CO2 SERPL-SCNC: 28 MMOL/L (ref 20–32)
COPATH REPORT: NORMAL
CREAT SERPL-MCNC: 1.12 MG/DL (ref 0.52–1.04)
CREAT SERPL-MCNC: 1.14 MG/DL (ref 0.52–1.04)
CREAT SERPL-MCNC: 1.15 MG/DL (ref 0.52–1.04)
CREAT SERPL-MCNC: 1.16 MG/DL (ref 0.52–1.04)
DIFFERENTIAL METHOD BLD: ABNORMAL
EOSINOPHIL # BLD AUTO: 0 10E9/L (ref 0–0.7)
EOSINOPHIL NFR BLD AUTO: 0 %
ERYTHROCYTE [DISTWIDTH] IN BLOOD BY AUTOMATED COUNT: 18.1 % (ref 10–15)
ERYTHROCYTE [DISTWIDTH] IN BLOOD BY AUTOMATED COUNT: 18.1 % (ref 10–15)
ERYTHROCYTE [DISTWIDTH] IN BLOOD BY AUTOMATED COUNT: 18.2 % (ref 10–15)
ERYTHROCYTE [DISTWIDTH] IN BLOOD BY AUTOMATED COUNT: 18.5 % (ref 10–15)
FIBRINOGEN PPP-MCNC: 277 MG/DL (ref 200–420)
FIBRINOGEN PPP-MCNC: 301 MG/DL (ref 200–420)
FIBRINOGEN PPP-MCNC: 325 MG/DL (ref 200–420)
FIBRINOGEN PPP-MCNC: 327 MG/DL (ref 200–420)
G ACTUAL CLOT STRENGTH: 5.6 KD/SC (ref 4.5–11)
GFR SERPL CREATININE-BSD FRML MDRD: 51 ML/MIN/{1.73_M2}
GFR SERPL CREATININE-BSD FRML MDRD: 52 ML/MIN/{1.73_M2}
GFR SERPL CREATININE-BSD FRML MDRD: 53 ML/MIN/{1.73_M2}
GFR SERPL CREATININE-BSD FRML MDRD: 54 ML/MIN/{1.73_M2}
GLUCOSE BLDC GLUCOMTR-MCNC: 102 MG/DL (ref 70–99)
GLUCOSE BLDC GLUCOMTR-MCNC: 103 MG/DL (ref 70–99)
GLUCOSE BLDC GLUCOMTR-MCNC: 106 MG/DL (ref 70–99)
GLUCOSE BLDC GLUCOMTR-MCNC: 82 MG/DL (ref 70–99)
GLUCOSE BLDC GLUCOMTR-MCNC: 83 MG/DL (ref 70–99)
GLUCOSE BLDC GLUCOMTR-MCNC: 88 MG/DL (ref 70–99)
GLUCOSE BLDC GLUCOMTR-MCNC: 94 MG/DL (ref 70–99)
GLUCOSE BLDC GLUCOMTR-MCNC: 95 MG/DL (ref 70–99)
GLUCOSE BLDC GLUCOMTR-MCNC: 95 MG/DL (ref 70–99)
GLUCOSE SERPL-MCNC: 100 MG/DL (ref 70–99)
GLUCOSE SERPL-MCNC: 96 MG/DL (ref 70–99)
GLUCOSE SERPL-MCNC: 97 MG/DL (ref 70–99)
GLUCOSE SERPL-MCNC: 98 MG/DL (ref 70–99)
HCO3 BLD-SCNC: 25 MMOL/L (ref 21–28)
HCO3 BLD-SCNC: 26 MMOL/L (ref 21–28)
HCT VFR BLD AUTO: 26.1 % (ref 35–47)
HCT VFR BLD AUTO: 26.3 % (ref 35–47)
HCT VFR BLD AUTO: 26.8 % (ref 35–47)
HCT VFR BLD AUTO: 27.6 % (ref 35–47)
HGB BLD-MCNC: 8.3 G/DL (ref 11.7–15.7)
HGB BLD-MCNC: 8.5 G/DL (ref 11.7–15.7)
HGB BLD-MCNC: 8.6 G/DL (ref 11.7–15.7)
HGB BLD-MCNC: 8.6 G/DL (ref 11.7–15.7)
HGB BLD-MCNC: 8.8 G/DL (ref 11.7–15.7)
INR PPP: 1.23 (ref 0.86–1.14)
INR PPP: 1.26 (ref 0.86–1.14)
INR PPP: 1.29 (ref 0.86–1.14)
INR PPP: 1.33 (ref 0.86–1.14)
K TIME TO SPEC CLOT STRENGTH: 2.4 MINUTE (ref 1–3)
LACTATE BLD-SCNC: 0.6 MMOL/L (ref 0.7–2)
LACTATE BLD-SCNC: 0.6 MMOL/L (ref 0.7–2)
LACTATE BLD-SCNC: 0.7 MMOL/L (ref 0.7–2)
LACTATE BLD-SCNC: 0.7 MMOL/L (ref 0.7–2)
LY30 LYSIS AT 30 MINUTES: 0 % (ref 0–8)
LY60 LYSIS AT 60 MINUTES: 0.2 % (ref 0–15)
LYMPHOCYTES # BLD AUTO: 0.1 10E9/L (ref 0.8–5.3)
LYMPHOCYTES # BLD AUTO: 0.1 10E9/L (ref 0.8–5.3)
LYMPHOCYTES # BLD AUTO: 0.2 10E9/L (ref 0.8–5.3)
LYMPHOCYTES # BLD AUTO: 0.2 10E9/L (ref 0.8–5.3)
LYMPHOCYTES NFR BLD AUTO: 1.8 %
LYMPHOCYTES NFR BLD AUTO: 1.8 %
LYMPHOCYTES NFR BLD AUTO: 2.6 %
LYMPHOCYTES NFR BLD AUTO: 3.4 %
MA MAXIMUM CLOT STRENGTH: 52.9 MM (ref 50–70)
MACROCYTES BLD QL SMEAR: PRESENT
MAGNESIUM SERPL-MCNC: 2.5 MG/DL (ref 1.6–2.3)
MAGNESIUM SERPL-MCNC: 2.6 MG/DL (ref 1.6–2.3)
MAGNESIUM SERPL-MCNC: 2.7 MG/DL (ref 1.6–2.3)
MAGNESIUM SERPL-MCNC: 2.7 MG/DL (ref 1.6–2.3)
MCH RBC QN AUTO: 29.4 PG (ref 26.5–33)
MCH RBC QN AUTO: 29.5 PG (ref 26.5–33)
MCH RBC QN AUTO: 29.8 PG (ref 26.5–33)
MCH RBC QN AUTO: 30 PG (ref 26.5–33)
MCHC RBC AUTO-ENTMCNC: 31.2 G/DL (ref 31.5–36.5)
MCHC RBC AUTO-ENTMCNC: 32.3 G/DL (ref 31.5–36.5)
MCHC RBC AUTO-ENTMCNC: 32.8 G/DL (ref 31.5–36.5)
MCHC RBC AUTO-ENTMCNC: 33 G/DL (ref 31.5–36.5)
MCV RBC AUTO: 90 FL (ref 78–100)
MCV RBC AUTO: 90 FL (ref 78–100)
MCV RBC AUTO: 93 FL (ref 78–100)
MCV RBC AUTO: 94 FL (ref 78–100)
MICROCYTES BLD QL SMEAR: PRESENT
MONOCYTES # BLD AUTO: 0.2 10E9/L (ref 0–1.3)
MONOCYTES # BLD AUTO: 0.3 10E9/L (ref 0–1.3)
MONOCYTES # BLD AUTO: 0.3 10E9/L (ref 0–1.3)
MONOCYTES # BLD AUTO: 0.5 10E9/L (ref 0–1.3)
MONOCYTES NFR BLD AUTO: 2.6 %
MONOCYTES NFR BLD AUTO: 3.6 %
MONOCYTES NFR BLD AUTO: 4.3 %
MONOCYTES NFR BLD AUTO: 7.9 %
MYELOCYTES # BLD: 0.1 10E9/L
MYELOCYTES NFR BLD MANUAL: 0.9 %
NEUTROPHILS # BLD AUTO: 6.2 10E9/L (ref 1.6–8.3)
NEUTROPHILS # BLD AUTO: 6.7 10E9/L (ref 1.6–8.3)
NEUTROPHILS # BLD AUTO: 7.6 10E9/L (ref 1.6–8.3)
NEUTROPHILS # BLD AUTO: 7.7 10E9/L (ref 1.6–8.3)
NEUTROPHILS NFR BLD AUTO: 89.4 %
NEUTROPHILS NFR BLD AUTO: 92.3 %
NEUTROPHILS NFR BLD AUTO: 94.6 %
NEUTROPHILS NFR BLD AUTO: 94.8 %
NRBC # BLD AUTO: 0.1 10*3/UL
NRBC # BLD AUTO: 0.2 10*3/UL
NRBC BLD AUTO-RTO: 1 /100
NRBC BLD AUTO-RTO: 3 /100
NUM BPU REQUESTED: 1
O2/TOTAL GAS SETTING VFR VENT: 35 %
PCO2 BLD: 38 MM HG (ref 35–45)
PCO2 BLD: 39 MM HG (ref 35–45)
PCO2 BLD: 41 MM HG (ref 35–45)
PCO2 BLD: 47 MM HG (ref 35–45)
PH BLD: 7.35 PH (ref 7.35–7.45)
PH BLD: 7.4 PH (ref 7.35–7.45)
PH BLD: 7.41 PH (ref 7.35–7.45)
PH BLD: 7.43 PH (ref 7.35–7.45)
PHOSPHATE SERPL-MCNC: 4.5 MG/DL (ref 2.5–4.5)
PHOSPHATE SERPL-MCNC: 4.7 MG/DL (ref 2.5–4.5)
PHOSPHATE SERPL-MCNC: 4.8 MG/DL (ref 2.5–4.5)
PHOSPHATE SERPL-MCNC: 5.1 MG/DL (ref 2.5–4.5)
PLATELET # BLD AUTO: 58 10E9/L (ref 150–450)
PLATELET # BLD AUTO: 58 10E9/L (ref 150–450)
PLATELET # BLD AUTO: 59 10E9/L (ref 150–450)
PLATELET # BLD AUTO: 63 10E9/L (ref 150–450)
PLATELET # BLD AUTO: 63 10E9/L (ref 150–450)
PLATELET # BLD EST: ABNORMAL 10*3/UL
PO2 BLD: 106 MM HG (ref 80–105)
PO2 BLD: 113 MM HG (ref 80–105)
PO2 BLD: 119 MM HG (ref 80–105)
PO2 BLD: 99 MM HG (ref 80–105)
POIKILOCYTOSIS BLD QL SMEAR: SLIGHT
POLYCHROMASIA BLD QL SMEAR: SLIGHT
POTASSIUM SERPL-SCNC: 4.4 MMOL/L (ref 3.4–5.3)
POTASSIUM SERPL-SCNC: 4.5 MMOL/L (ref 3.4–5.3)
POTASSIUM SERPL-SCNC: 4.7 MMOL/L (ref 3.4–5.3)
POTASSIUM SERPL-SCNC: 4.7 MMOL/L (ref 3.4–5.3)
PROT SERPL-MCNC: 4.6 G/DL (ref 6.8–8.8)
PROT SERPL-MCNC: 4.8 G/DL (ref 6.8–8.8)
PROT SERPL-MCNC: 4.9 G/DL (ref 6.8–8.8)
PROT SERPL-MCNC: 5 G/DL (ref 6.8–8.8)
R TIME UNTIL CLOT FORMS: 4.3 MINUTE (ref 5–10)
RBC # BLD AUTO: 2.83 10E12/L (ref 3.8–5.2)
RBC # BLD AUTO: 2.89 10E12/L (ref 3.8–5.2)
RBC # BLD AUTO: 2.93 10E12/L (ref 3.8–5.2)
RBC # BLD AUTO: 2.98 10E12/L (ref 3.8–5.2)
SODIUM SERPL-SCNC: 140 MMOL/L (ref 133–144)
SODIUM SERPL-SCNC: 141 MMOL/L (ref 133–144)
SPECIMEN SOURCE: NORMAL
TACROLIMUS BLD-MCNC: <3 UG/L (ref 5–15)
TME LAST DOSE: ABNORMAL H
TRANSFUSION STATUS PATIENT QL: NORMAL
WBC # BLD AUTO: 6.9 10E9/L (ref 4–11)
WBC # BLD AUTO: 7.3 10E9/L (ref 4–11)
WBC # BLD AUTO: 8 10E9/L (ref 4–11)
WBC # BLD AUTO: 8.1 10E9/L (ref 4–11)

## 2019-08-21 PROCEDURE — P9037 PLATE PHERES LEUKOREDU IRRAD: HCPCS | Performed by: NURSE PRACTITIONER

## 2019-08-21 PROCEDURE — 85396 CLOTTING ASSAY WHOLE BLOOD: CPT | Performed by: STUDENT IN AN ORGANIZED HEALTH CARE EDUCATION/TRAINING PROGRAM

## 2019-08-21 PROCEDURE — 25000132 ZZH RX MED GY IP 250 OP 250 PS 637: Performed by: SURGERY

## 2019-08-21 PROCEDURE — 80197 ASSAY OF TACROLIMUS: CPT | Performed by: PHYSICIAN ASSISTANT

## 2019-08-21 PROCEDURE — 85025 COMPLETE CBC W/AUTO DIFF WBC: CPT | Performed by: STUDENT IN AN ORGANIZED HEALTH CARE EDUCATION/TRAINING PROGRAM

## 2019-08-21 PROCEDURE — 25000132 ZZH RX MED GY IP 250 OP 250 PS 637: Performed by: NURSE PRACTITIONER

## 2019-08-21 PROCEDURE — 82330 ASSAY OF CALCIUM: CPT | Performed by: NURSE PRACTITIONER

## 2019-08-21 PROCEDURE — 25000128 H RX IP 250 OP 636: Performed by: SURGERY

## 2019-08-21 PROCEDURE — 25000132 ZZH RX MED GY IP 250 OP 250 PS 637: Performed by: STUDENT IN AN ORGANIZED HEALTH CARE EDUCATION/TRAINING PROGRAM

## 2019-08-21 PROCEDURE — 84100 ASSAY OF PHOSPHORUS: CPT | Performed by: GENERAL ACUTE CARE HOSPITAL

## 2019-08-21 PROCEDURE — 85018 HEMOGLOBIN: CPT | Performed by: STUDENT IN AN ORGANIZED HEALTH CARE EDUCATION/TRAINING PROGRAM

## 2019-08-21 PROCEDURE — 25800030 ZZH RX IP 258 OP 636: Performed by: SURGERY

## 2019-08-21 PROCEDURE — 85049 AUTOMATED PLATELET COUNT: CPT | Performed by: STUDENT IN AN ORGANIZED HEALTH CARE EDUCATION/TRAINING PROGRAM

## 2019-08-21 PROCEDURE — 83735 ASSAY OF MAGNESIUM: CPT | Performed by: STUDENT IN AN ORGANIZED HEALTH CARE EDUCATION/TRAINING PROGRAM

## 2019-08-21 PROCEDURE — 25000125 ZZHC RX 250: Performed by: INTERNAL MEDICINE

## 2019-08-21 PROCEDURE — 85384 FIBRINOGEN ACTIVITY: CPT | Performed by: STUDENT IN AN ORGANIZED HEALTH CARE EDUCATION/TRAINING PROGRAM

## 2019-08-21 PROCEDURE — 40000196 ZZH STATISTIC RAPCV CVP MONITORING

## 2019-08-21 PROCEDURE — 85610 PROTHROMBIN TIME: CPT | Performed by: GENERAL ACUTE CARE HOSPITAL

## 2019-08-21 PROCEDURE — 25000128 H RX IP 250 OP 636: Performed by: NURSE PRACTITIONER

## 2019-08-21 PROCEDURE — 85025 COMPLETE CBC W/AUTO DIFF WBC: CPT | Performed by: GENERAL ACUTE CARE HOSPITAL

## 2019-08-21 PROCEDURE — 80053 COMPREHEN METABOLIC PANEL: CPT | Performed by: GENERAL ACUTE CARE HOSPITAL

## 2019-08-21 PROCEDURE — 27210429 ZZH NUTRITION PRODUCT INTERMEDIATE LITER

## 2019-08-21 PROCEDURE — 40000014 ZZH STATISTIC ARTERIAL MONITORING DAILY

## 2019-08-21 PROCEDURE — 25000125 ZZHC RX 250: Performed by: STUDENT IN AN ORGANIZED HEALTH CARE EDUCATION/TRAINING PROGRAM

## 2019-08-21 PROCEDURE — 25800030 ZZH RX IP 258 OP 636

## 2019-08-21 PROCEDURE — 40000275 ZZH STATISTIC RCP TIME EA 10 MIN

## 2019-08-21 PROCEDURE — 25000131 ZZH RX MED GY IP 250 OP 636 PS 637: Performed by: PHYSICIAN ASSISTANT

## 2019-08-21 PROCEDURE — 85384 FIBRINOGEN ACTIVITY: CPT | Performed by: GENERAL ACUTE CARE HOSPITAL

## 2019-08-21 PROCEDURE — 25000128 H RX IP 250 OP 636: Performed by: STUDENT IN AN ORGANIZED HEALTH CARE EDUCATION/TRAINING PROGRAM

## 2019-08-21 PROCEDURE — 82803 BLOOD GASES ANY COMBINATION: CPT | Performed by: NURSE PRACTITIONER

## 2019-08-21 PROCEDURE — 85610 PROTHROMBIN TIME: CPT | Performed by: STUDENT IN AN ORGANIZED HEALTH CARE EDUCATION/TRAINING PROGRAM

## 2019-08-21 PROCEDURE — 20000004 ZZH R&B ICU UMMC

## 2019-08-21 PROCEDURE — 94003 VENT MGMT INPAT SUBQ DAY: CPT

## 2019-08-21 PROCEDURE — 82248 BILIRUBIN DIRECT: CPT | Performed by: STUDENT IN AN ORGANIZED HEALTH CARE EDUCATION/TRAINING PROGRAM

## 2019-08-21 PROCEDURE — 00000146 ZZHCL STATISTIC GLUCOSE BY METER IP

## 2019-08-21 PROCEDURE — 83735 ASSAY OF MAGNESIUM: CPT | Performed by: GENERAL ACUTE CARE HOSPITAL

## 2019-08-21 PROCEDURE — 84100 ASSAY OF PHOSPHORUS: CPT | Performed by: STUDENT IN AN ORGANIZED HEALTH CARE EDUCATION/TRAINING PROGRAM

## 2019-08-21 PROCEDURE — 90947 DIALYSIS REPEATED EVAL: CPT

## 2019-08-21 PROCEDURE — 80053 COMPREHEN METABOLIC PANEL: CPT | Performed by: STUDENT IN AN ORGANIZED HEALTH CARE EDUCATION/TRAINING PROGRAM

## 2019-08-21 PROCEDURE — 83605 ASSAY OF LACTIC ACID: CPT | Performed by: NURSE PRACTITIONER

## 2019-08-21 RX ORDER — DEXTROSE MONOHYDRATE 25 G/50ML
25-50 INJECTION, SOLUTION INTRAVENOUS
Status: DISCONTINUED | OUTPATIENT
Start: 2019-08-21 | End: 2019-09-06

## 2019-08-21 RX ORDER — AMOXICILLIN 250 MG
1 CAPSULE ORAL 2 TIMES DAILY
Status: DISCONTINUED | OUTPATIENT
Start: 2019-08-21 | End: 2019-08-23

## 2019-08-21 RX ORDER — OXYMETAZOLINE HYDROCHLORIDE 0.05 G/100ML
2 SPRAY NASAL 2 TIMES DAILY
Status: DISCONTINUED | OUTPATIENT
Start: 2019-08-21 | End: 2019-08-22

## 2019-08-21 RX ORDER — SODIUM CHLORIDE 9 MG/ML
INJECTION, SOLUTION INTRAVENOUS
Status: COMPLETED
Start: 2019-08-21 | End: 2019-08-21

## 2019-08-21 RX ORDER — PROPOFOL 10 MG/ML
5-75 INJECTION, EMULSION INTRAVENOUS ONCE
Status: DISCONTINUED | OUTPATIENT
Start: 2019-08-21 | End: 2019-08-21

## 2019-08-21 RX ORDER — OXYMETAZOLINE HYDROCHLORIDE 0.05 G/100ML
2 SPRAY NASAL 2 TIMES DAILY
Status: DISCONTINUED | OUTPATIENT
Start: 2019-08-21 | End: 2019-08-21

## 2019-08-21 RX ORDER — NICOTINE POLACRILEX 4 MG
15-30 LOZENGE BUCCAL
Status: DISCONTINUED | OUTPATIENT
Start: 2019-08-21 | End: 2019-09-06

## 2019-08-21 RX ORDER — POLYETHYLENE GLYCOL 3350 17 G/17G
17 POWDER, FOR SOLUTION ORAL 2 TIMES DAILY
Status: DISCONTINUED | OUTPATIENT
Start: 2019-08-21 | End: 2019-09-05

## 2019-08-21 RX ADMIN — SALINE NASAL SPRAY 2 SPRAY: 1.5 SOLUTION NASAL at 07:38

## 2019-08-21 RX ADMIN — FAMOTIDINE 20 MG: 20 TABLET ORAL at 07:37

## 2019-08-21 RX ADMIN — VALGANCICLOVIR HYDROCHLORIDE 450 MG: 50 POWDER, FOR SOLUTION ORAL at 09:00

## 2019-08-21 RX ADMIN — MELATONIN 1000 UNITS: at 19:22

## 2019-08-21 RX ADMIN — MYCOPHENOLATE MOFETIL 750 MG: 200 POWDER, FOR SUSPENSION ORAL at 07:38

## 2019-08-21 RX ADMIN — CALCIUM CHLORIDE, MAGNESIUM CHLORIDE, SODIUM CHLORIDE, SODIUM BICARBONATE, POTASSIUM CHLORIDE AND SODIUM PHOSPHATE DIBASIC DIHYDRATE 12.5 ML/KG/HR: 3.68; 3.05; 6.34; 3.09; .314; .187 INJECTION INTRAVENOUS at 06:06

## 2019-08-21 RX ADMIN — CALCIUM CHLORIDE, MAGNESIUM CHLORIDE, SODIUM CHLORIDE, SODIUM BICARBONATE, POTASSIUM CHLORIDE AND SODIUM PHOSPHATE DIBASIC DIHYDRATE 1.89 ML/KG/HR: 3.68; 3.05; 6.34; 3.09; .314; .187 INJECTION INTRAVENOUS at 16:55

## 2019-08-21 RX ADMIN — SODIUM CHLORIDE 200 ML: 9 INJECTION, SOLUTION INTRAVENOUS at 23:44

## 2019-08-21 RX ADMIN — MULTIVITAMIN 15 ML: LIQUID ORAL at 07:36

## 2019-08-21 RX ADMIN — MELATONIN 1000 UNITS: at 07:37

## 2019-08-21 RX ADMIN — Medication 2 DROP: at 23:34

## 2019-08-21 RX ADMIN — SENNOSIDES AND DOCUSATE SODIUM 1 TABLET: 8.6; 5 TABLET ORAL at 19:22

## 2019-08-21 RX ADMIN — POLYETHYLENE GLYCOL 3350 17 G: 17 POWDER, FOR SOLUTION ORAL at 11:20

## 2019-08-21 RX ADMIN — ASPIRIN 325 MG ORAL TABLET 325 MG: 325 PILL ORAL at 07:37

## 2019-08-21 RX ADMIN — METHYLPREDNISOLONE SODIUM SUCCINATE 50 MG: 125 INJECTION, POWDER, FOR SOLUTION INTRAMUSCULAR; INTRAVENOUS at 07:36

## 2019-08-21 RX ADMIN — CALCIUM CHLORIDE, MAGNESIUM CHLORIDE, SODIUM CHLORIDE, SODIUM BICARBONATE, POTASSIUM CHLORIDE AND SODIUM PHOSPHATE DIBASIC DIHYDRATE 12.5 ML/KG/HR: 3.68; 3.05; 6.34; 3.09; .314; .187 INJECTION INTRAVENOUS at 20:08

## 2019-08-21 RX ADMIN — CALCIUM CHLORIDE, MAGNESIUM CHLORIDE, SODIUM CHLORIDE, SODIUM BICARBONATE, POTASSIUM CHLORIDE AND SODIUM PHOSPHATE DIBASIC DIHYDRATE 12.5 ML/KG/HR: 3.68; 3.05; 6.34; 3.09; .314; .187 INJECTION INTRAVENOUS at 16:52

## 2019-08-21 RX ADMIN — POLYETHYLENE GLYCOL 3350 17 G: 17 POWDER, FOR SOLUTION ORAL at 19:22

## 2019-08-21 RX ADMIN — OXYCODONE HYDROCHLORIDE 5 MG: 5 TABLET ORAL at 09:00

## 2019-08-21 RX ADMIN — Medication 2 DROP: at 05:14

## 2019-08-21 RX ADMIN — Medication 1 PACKET: at 19:22

## 2019-08-21 RX ADMIN — CALCIUM CHLORIDE, MAGNESIUM CHLORIDE, SODIUM CHLORIDE, SODIUM BICARBONATE, POTASSIUM CHLORIDE AND SODIUM PHOSPHATE DIBASIC DIHYDRATE 12.5 ML/KG/HR: 3.68; 3.05; 6.34; 3.09; .314; .187 INJECTION INTRAVENOUS at 16:54

## 2019-08-21 RX ADMIN — FAMOTIDINE 20 MG: 20 TABLET ORAL at 19:22

## 2019-08-21 RX ADMIN — Medication 1 PACKET: at 07:39

## 2019-08-21 RX ADMIN — OXYMETAZOLINE HYDROCHLORIDE 2 SPRAY: 0.05 SPRAY NASAL at 19:23

## 2019-08-21 RX ADMIN — SALINE NASAL SPRAY 2 SPRAY: 1.5 SOLUTION NASAL at 01:46

## 2019-08-21 RX ADMIN — SENNOSIDES AND DOCUSATE SODIUM 1 TABLET: 8.6; 5 TABLET ORAL at 11:20

## 2019-08-21 RX ADMIN — CALCIUM CHLORIDE, MAGNESIUM CHLORIDE, SODIUM CHLORIDE, SODIUM BICARBONATE, POTASSIUM CHLORIDE AND SODIUM PHOSPHATE DIBASIC DIHYDRATE 12.5 ML/KG/HR: 3.68; 3.05; 6.34; 3.09; .314; .187 INJECTION INTRAVENOUS at 13:12

## 2019-08-21 RX ADMIN — Medication 3 MG: at 18:11

## 2019-08-21 RX ADMIN — MYCOPHENOLATE MOFETIL 750 MG: 200 POWDER, FOR SUSPENSION ORAL at 18:10

## 2019-08-21 RX ADMIN — HYDROMORPHONE HYDROCHLORIDE 0.5 MG: 1 INJECTION, SOLUTION INTRAMUSCULAR; INTRAVENOUS; SUBCUTANEOUS at 11:57

## 2019-08-21 RX ADMIN — CALCIUM CHLORIDE, MAGNESIUM CHLORIDE, SODIUM CHLORIDE, SODIUM BICARBONATE, POTASSIUM CHLORIDE AND SODIUM PHOSPHATE DIBASIC DIHYDRATE 12.5 ML/KG/HR: 3.68; 3.05; 6.34; 3.09; .314; .187 INJECTION INTRAVENOUS at 09:44

## 2019-08-21 RX ADMIN — MICAFUNGIN SODIUM 100 MG: 10 INJECTION, POWDER, LYOPHILIZED, FOR SOLUTION INTRAVENOUS at 11:34

## 2019-08-21 RX ADMIN — LEVOTHYROXINE SODIUM 125 MCG: 0.12 TABLET ORAL at 07:37

## 2019-08-21 RX ADMIN — OXYMETAZOLINE HYDROCHLORIDE 2 SPRAY: 0.05 SPRAY NASAL at 10:22

## 2019-08-21 RX ADMIN — CALCIUM CHLORIDE, MAGNESIUM CHLORIDE, SODIUM CHLORIDE, SODIUM BICARBONATE, POTASSIUM CHLORIDE AND SODIUM PHOSPHATE DIBASIC DIHYDRATE 12.5 ML/KG/HR: 3.68; 3.05; 6.34; 3.09; .314; .187 INJECTION INTRAVENOUS at 13:11

## 2019-08-21 RX ADMIN — OXYCODONE HYDROCHLORIDE 5 MG: 5 TABLET ORAL at 01:23

## 2019-08-21 RX ADMIN — CALCIUM CHLORIDE, MAGNESIUM CHLORIDE, SODIUM CHLORIDE, SODIUM BICARBONATE, POTASSIUM CHLORIDE AND SODIUM PHOSPHATE DIBASIC DIHYDRATE 12.5 ML/KG/HR: 3.68; 3.05; 6.34; 3.09; .314; .187 INJECTION INTRAVENOUS at 09:40

## 2019-08-21 RX ADMIN — FENTANYL CITRATE 75 MCG/HR: 50 INJECTION INTRAVENOUS at 22:43

## 2019-08-21 RX ADMIN — Medication 1 MG: at 07:38

## 2019-08-21 RX ADMIN — Medication 2 DROP: at 11:21

## 2019-08-21 RX ADMIN — CALCIUM CHLORIDE, MAGNESIUM CHLORIDE, SODIUM CHLORIDE, SODIUM BICARBONATE, POTASSIUM CHLORIDE AND SODIUM PHOSPHATE DIBASIC DIHYDRATE 12.5 ML/KG/HR: 3.68; 3.05; 6.34; 3.09; .314; .187 INJECTION INTRAVENOUS at 02:00

## 2019-08-21 RX ADMIN — Medication 2 DROP: at 18:10

## 2019-08-21 RX ADMIN — Medication 1 PACKET: at 14:18

## 2019-08-21 ASSESSMENT — ACTIVITIES OF DAILY LIVING (ADL)
ADLS_ACUITY_SCORE: 28
ADLS_ACUITY_SCORE: 26
ADLS_ACUITY_SCORE: 28

## 2019-08-21 NOTE — PROGRESS NOTES
Neuro- PERRL. Increased fentanyl to 75mcg/hr for grimacing with turns. Moves all extremities independently and intermittently to commands.  CV- Sinus vitaly 50s to NSR 70s. MAPs >65 with no interventions. CVP 13-14. Pulses to BLE dopplered. Significant edema to hands and BLE. Afebrile. 1 unit of plts given.  Pulm-CMV 16/400/35%/PEEP 5; small amount gómez secretions; lungs clear/diminished bases  GI- OG w/  TF @ 10 ml/hr; Insulin remained off over night.  No BM.  - Anuric. CRRT goal to pull 0-100ml/hr if BP tolerates. Able to pull fluid. Bladder scan this morning was 126ml.  Skin- Clamshell dressing intact. R arm mepliex. JPs x2 to bilateral abd. L OBED puts out more than R. Significant amount out over night (see I+O) MD Cruz made aware. MD instructed RN to notify her if there were a change in patients vitals.     Will continue to monitor and assess for changes.

## 2019-08-21 NOTE — PROGRESS NOTES
CRRT STATUS NOTE    DATA:  Time:  5:51 PM  Pressures WNL:  YES  Filter Status:  CRRT Filter Status:WDL    Problems Reported/Alarms Noted: none    Supplies Present:  YES    ASSESSMENT:  Patient Net Fluid Balance:  Net negative 2 liters for today  Vital Signs: T 97.9 HR66 /65 MAP81 RR16  Labs:  K4.7 ICa4.5 Lactic0.6 Hgb8.6   Goals of Therapy:  0 to 100 ml/hr net negative as tolerated.     INTERVENTIONS:   Discussed goals of care with bedside RN    PLAN:  Continue plan of care

## 2019-08-21 NOTE — PROGRESS NOTES
SURGICAL ICU PROGRESS NOTE  August 21, 2019      ASSESSMENT: Nicci Pemberton 59F w/ PMH of ESLD (MELD 39) 2/2 ANGULO and alpha-1-antitrypsin deficiency c/b cirrhosis, SBP, lymphedema, anemia s/p DDLT on 8/18/19, with hypovolemic/hemorrhagic shock.        CHANGES:  Follow-up bleeding  +/- NJT  PST  Miralax  Senna   Discontinue insulin gtt  Sliding scale insulin  Discontinue norepinephrine order       Neurological  # Acute postoperative pain  - Monitor neurological status. Delirium preventions and precautions.   - Pain: fentanyl, prn dilaudid, prn oxycodone, no acetaminophen  - Sedation: off propofol      # hx of hepatic encephalopathy     Pulmonary  # Post operative ventilatory support  - VENT CMV 16/400/5/35: Continue full vent support. PST when meets criteria. Ventilatory bundle. HOB elevation     Cardiovascular  # Shock - hypovolemic/hemorrhagic  - Goal 100 < SBP >140, 8 < CVP <12  - Monitor hemodynamic status with arterial line  - Fluid and product resuscitation to achieve goal   - off Norepinephrine gtt for goal MAPs >65  - Dobutamine stress test 6/19/2019 negative for ischemia     # hx of HTN  - PTA spironolactone 100 mg BID, bumetanide 1 mg   - Hold while pressures soft     Gastroenterology/Nutrition  # ESLD 2/2 Alpha-1-antitrypsin and ANGULO s/p DDLT 8/18/19    # heterozygous (MZ genotype) for alpha-1-antitrypsin with secondary iron overload  # hyperbilirubinemia  # umbilical hernia  - MELD at transplant: 39  - most recent EGD 5/7/2019 without esophageal or gastric varices  - Q12h CMP, CBC  - bowel regimen: Miralax, Senna     # Protein calorie deficit malnutrition   - No indication for parenteral nutrition.  - trophic tube feeds     - PTA famotidine 20 mg BID     Fluids/Electrolytes  - prn D5 1/2NS @125 for IV fluid hydration, but consider restarting if hemodynamically stable     # hyponatremia  # hypocalcemia  - ICU electrolyte replacement protocol     Renal  # acute kidney injury, baseline Cr ~0.6  # history of  hepatorenal syndrome  - Monitor intake and output.  - Goal UOP >0.50 ml/kg/hr  - CRRT - remove fluids per nephrology recommendations  - Dialyzed intraoperatively through left internal jugular line, may require further dialysis pending progression of kidney function overnight     Endocrine  # Stress and steroid hyperglycemia   - discontinued Insulin gtt for glucose management. Goal to keep BG< 180 for optimal wound healing   - insulin sliding scale     # hx of hypothyroidism  - PTA levothyroxine 125 mcg daily     ID  # Immunosuppression  - Continue micafungin   - Continue ppx: valcyte  - continue immunosuppression: methylprednisolone, mycophenolate, tacrolimus (initiated 8/19/2019)    - f/u cultures     # SBP  - Rifaximin discontinued (8/19/2019)     Heme  # Acute blood loss anemia, EBL 5L  # thrombocytopenia  # coagulopathy  - Transfuse if hgb <8.0 or signs/symptoms of hypoperfusion  - Goal INR <2, Fibrinogen >200, plt >[id number]  - Q4h hgb, PCT, fibrinogen, INR, lactate, ABG  - type and screen q72h  - hold DDAVP  - TEG     Musculoskeletal  # weakness and deconditioning of critical illness   # osteoarthritis  - Physical and occupational therapy consult when extubated     General Cares/Prophylaxis  DVT Prophylaxis: Pneumatic Compression Devices  GI Prophylaxis: pepcid BID   Restraints: No   Lines/ tubes/ drains:  - ETT  - L abd JPx1  - R abd OBED x1  - R internal jugular CVC  - L internal jugular dialysis catheter  - L radial arterial line  - PIVx2  - Forrester  Code: Full  Disposition: Surgical ICU     Patient seen, findings and plan discussed with surgical ICU staff, Dr. Bolton.  Malachi Paredes MD   SICU Resident     ====================================    TODAY'S PROGRESS:   SUBJECTIVE:   Patient received one unit of plts overnight. After her nasal packing was removed this AM, patient started bleeding again, which did not resolve despite Afrin use. ENT will reevaluate the patient.    OBJECTIVE:   1. VITAL SIGNS:    Temp:  [97.4  F (36.3  C)-98.3  F (36.8  C)] 97.9  F (36.6  C)  Heart Rate:  [60-84] 72  Resp:  [16-26] 16  MAP:  [68 mmHg-89 mmHg] 82 mmHg  Arterial Line BP: (101-133)/(45-66) 121/62  FiO2 (%):  [35 %] 35 %  SpO2:  [98 %-100 %] 100 %  Ventilation Mode: CMV/AC  (Continuous Mandatory Ventilation/ Assist Control)  FiO2 (%): 35 %  Rate Set (breaths/minute): 16 breaths/min  Tidal Volume Set (mL): 400 mL  PEEP (cm H2O): 5 cmH2O  Pressure Support (cm H2O): 7 cmH2O  Oxygen Concentration (%): 35 %  Resp: 16      2. INTAKE/ OUTPUT:   I/O last 3 completed shifts:  In: 3243.66 [I.V.:1537.66; NG/GT:440]  Out: 5725 [Urine:6; Drains:3105; Other:2614]    3. PHYSICAL EXAMINATION:   General:   Neuro: NAD  Resp: intubated  CV: RRR  Abdomen: Soft, Non-distended, Non-tender  Incisions: covered by dressing  Extremities: anasarca    4. INVESTIGATIONS:   Arterial Blood Gases   Recent Labs   Lab 08/21/19 0906 08/21/19 0449 08/20/19 2135 08/20/19  1539   PH 7.43 7.41 7.41 7.44   PCO2 38 39 38 36   PO2 106* 99 105 90   HCO3 25 25 24 24     Complete Blood Count   Recent Labs   Lab 08/21/19 0906 08/21/19 0449 08/20/19 2135 08/20/19  1546   WBC 6.9 7.3 7.4 7.2   HGB 8.6* 8.8* 9.0* 9.0*   PLT 58* 63* 42* 48*     Basic Metabolic Panel  Recent Labs   Lab 08/21/19 0906 08/21/19 0449 08/20/19 2135 08/20/19  0326    140 141 141   POTASSIUM 4.4 4.5 4.6 4.5   CHLORIDE 107 107 107 107   CO2 26 24 25 25   BUN 24 22 23 22   CR 1.15* 1.16* 1.17* 1.16*   GLC 96 100* 114* 152*     Liver Function Tests  Recent Labs   Lab 08/21/19  0906 08/21/19  0449 08/20/19  2135 08/20/19  1546  08/20/19  0326   * 276* 321*  --   --  522*   * 399* 419*  --   --  487*   ALKPHOS 100 95 81  --   --  72   BILITOTAL 5.3* 5.3* 5.0*  --   --  5.4*   ALBUMIN 2.2* 2.3* 2.2*  --   --  2.2*   INR 1.33* 1.23* 1.37* 1.36*   < > 1.47*    < > = values in this interval not displayed.     Pancreatic Enzymes  Recent Labs   Lab 08/19/19  0330 08/17/19  5214  08/16/19  1119   LIPASE 2,509*  --  364   AMYLASE 326* 45  --      Coagulation Profile  Recent Labs   Lab 08/21/19  0906 08/21/19  0449 08/20/19  2135 08/20/19  1546  08/18/19  2100  08/18/19  1758  08/18/19  1605  08/17/19  2341   INR 1.33* 1.23* 1.37* 1.36*   < > 1.88*   < > 2.79*   < > 3.38*   < > 3.64*   PTT  --   --   --   --   --  42*  --  103*  --  181*  --  66*    < > = values in this interval not displayed.         5. RADIOLOGY:   No results found for this or any previous visit (from the past 24 hour(s)).    =========================================      @[facility] [place]

## 2019-08-21 NOTE — PLAN OF CARE
Today's highlight - ENT removed packing from left nares ~ 0945, patient began bleeding from left nares > right nares and mouth. Afrin spray attempted as well as holding pressure to bridge of nose. SICU and ENT notified. Continued attempt to clot with this method until 1130 when ENT at bedside to evaluate. Unable to stop bleeding so nose was repacked in bilateral nares. 250cc blood collected from suctioning. 1U plt transfused - Hgb recheck completed.     N: Lethargic. Arouses to voice. Pupils 3+ equal and reactive, tracks around room. Intermittently follows commands but does not attempt to nod yes/no. Moves all extremities spontaneously and purposefully. Fentanyl gtt for pain, PRN dilaudid given during ENT procedure.   C: NSR 60s with occasional PVC/PACs. MAP > 65, SBP >100 without intervention. CVP 13,14. Generalized moderate edema + lymphedema. Afebrile. Bear hugger on. Doppler BLE.   R: CMV this morning. PS 35% 12/5 attempted but failed due to apnea. Changed to SIMV+ 35% 12/5. PS again this evening since 1620  35% 12/5. ABG q6. Lungs coarse/diminished. Bilateral nares packed again by ENT.   GI: OGT - advancing feeds 10ccq8 (goal 35). Increase to 30 @ 2200. Abdomen obese. Clamshell incision SILVIA (dressing removed by transplant). R and L OBED drains, L>R output, transplant aware. Bowel regimen added - hypoactive bowel sounds. Sliding scale insulin - no correction needed.   : CRRT, goal 0-100/hr. Net negative mostly due to drain output. Bladder scan @ 1830 for 46cc.   ACCESS: RIJ CVC. LIJ dialysis. PIV X2. L artline.   GTTS: Fentanyl + TKO  ACTIVITY: Sling to chair. Turn q2 while in bed.   SOCIAL:  at bedside this morning and updated on plan of care.    PLAN: Continue to monitor for bleeding. Wean vent as tolerated. Increase tube feeds per order. Notify MD of changes/concerns.

## 2019-08-21 NOTE — PROGRESS NOTES
Otolaryngology Progress Note   8/21/2019    Contacted by SICU to evaluate patient for ongoing bleeding from nose and mouth since nasal packing was removed earlier this morning. Mostly bleeding from the left nasal passage, however, some blood noted on the right and in the oropharynx.     Opens eyes to voice, NAD, on sedating drips  Orotracheally intubated. Blood dripping from left nasal passage. Old blood pooled in oropharynx.     NASAL ENDOSCOPY/CONTROL OF EPISTAXI: There was extensive clot throughout the b/l nasal passages and oropharynx that required removal with suction and forceps. The left nasal passage was noted to be extremely macerated throughout and diffusely bleeding from all visible mucosal surfaces. Three layered pieces of gel foam wrapped in surgicel were placed in the nasal cavity to tamponade bleeding. The scope was then passed through the right nasal passage, there was a small area of bleeding on the head of the middle turbinate, remainder of nasal mucosa intact. No obvious source of bleeding in the nasopharynx, though blood and clot continuing to form from blood draining back from the nasal cavity. Two pieces of gel foam wrapped in surgicel were placed in the right nasal passage around the area of bleeding. No further active bleeding noted upon completion of procedure. Patient tolerated well with no complications.     - Packing placed is absorbable and does not require removal. Do not recommend attempting to remove or place any tubes/instrumentation through the nose at this time.   - Would tolerated slow/small amounts of oozing from the nose over the next 24h given degree of mucosal injury and diffuse bleeding seen. If bleeding becomes more brisk please contact ENT for repeat evaluation and consideration of more formal nasal packing to apply additional pressure (could consider merocel, rapid rhino, or epistat if very brisk)  - Recommend starting nasal saline spray tomorrow to b/l nasal passages Q4H    - May use Afrin nasal spray PRN for bleeding only, do not schedule due to risk of rebound nasal congestion with overuse  - Please contact ENT if patient is ready to extubate and nasal feedingtube is necessary.  Would recommend placing tube prior to extubation due to high risk of bleeding and possible airway compromise as a result. May need removal of some of the nasal packing to allow for NG tube placement. Would place tube in the right nasal passage, and stay low along the floor of the nose.   - Remainder of care per ICU    Greater than 60 minutes spent evaluating the patient, controlling bleeding, and coordinating care.     Tosha Ro PA-C  Otolaryngology-Head & Neck Surgery  Please contact ENT with questions by dialing [phone] and entering job code [phone] when prompted.

## 2019-08-21 NOTE — PLAN OF CARE
[id number]  NEURO:sedated on 50 fentanyl; very inconsistently follows commands & slow to respond; will withdraw to pain in all extrems and occasionally appear to purposefully move extrems; pupils 3 mm/equal/reactive  CV:SR 70s-80s; rare pvcs; MAP remains>65 & SBP>100 w/o issue  Pt temp WNL but shivers when blankets removed bear hugger & blood warmer remain in place  RESP:CMV 16/400/35%/PEEP 5; small amount gómez secretions; lungs clear/diminished bases  GI:trickle feeds via OG @ 10 ml/hr; insulin gtt stopped at start of shift d/t BG low 100s,   NO BM, pt needs scheduled bowel meds  MD Cruz paged to place order for sliding scale insulin since BG in 90s-100s w/ insulin gtt off.  :CRRT net goal 0-100 mls as BP allows; no issues-see output     SHIFT EVENTS:  High output from L OBED continues. Continues to be serosang/orange tinged    Please see MAR/flowsheets for further interventions/assessments     PLAN:Continue to monitor pt & notify MD of any acute changes. ENT to reevaluate nasal packing 8/21.    Lauren Aguirre, RN on 8/20/2019 at 11:00 PM

## 2019-08-21 NOTE — PROGRESS NOTES
Brief ENT Note    Oral packing removed yesterday. No signs of bleeding from oropharynx. Nasal packing removed this morning. Fresh clot present in L nare, however no obvious active bleeding seen.    - Nasal saline spray q2h to bilateral nares (ordered for you)  - if epistaxis, apply copious afrin to bleeding nare and hold digital pressure (nasal clips do not work) x 20 minutes, if persistent epistaxis, repeat aforementioned steps for a second round; if epistaxis persists after this time, contact ENT on-call resident (afrin ordered for you).  - SICU plan to place NJ today--recommend placement through right nasal cavity and very gentle placement so as not to cause traumatic epistaxis  - Recommend confirming NJ placement prior to extubation so that we are sure there is no bleeding associated with placement that could put her airway at risk if not secured    This patient was discussed with Dr. Gaffney.    Kera Vanegas MD PGY5  Otolaryngology-Head & Neck Surgery

## 2019-08-21 NOTE — PROGRESS NOTES
Immunosuppression Note:    Nicci Pemberton is a 59 year old female who is seen today  for immunosuppression management     I, Mandeep Alford MD, I have examined the patient with the resident/PA/Fellow, discussed and agree with the note and findings.  I have reviewed today's vital signs, medications, labs and imaging. I reviewed the immunosuppression medications and levels. I spoke to the patient/family and explained below clinical details and answered all the questions      Transplant Surgery  Inpatient Daily Progress Note  08/21/2019    Assessment & Plan: Nicci Pemberton is a 58 yo female with a past medical history significant for end stage liver disease 2/2 ANGULO and alpha 1 anti-trypsin deficiency now s/p DD OLT on 8/18/19    Graft function: Good, AST and ALT appear to have peaked and now with slight improvement. Lactate has normalized. Repeat US showed good flow.   Immunosuppression management:  mg BID, Tac level today <3, so tac increased to 3 mg BID. Solu-medrol taper as ordered. . Complexity of management:Medium. Contributing factors: anemia and induction  Hematology: Transfusing 3 units 8/20 overnight, transfusion goal hgb > 8 at this time. Hgb stable at this time.   Oral packing removed 8/20, nasal packing removed 8/21 AM. Pt re-bled, examination revealed no direct lesion, overall friable mucosa. Re-packed, ENT plans to leave packing in for 5 days.  Cardiorespiratory: Pressors off. Hx of hypertension on spironolactone and bumex at home, will hold off at this time.  GI/Nutrition: Tube feeds per OG, will advance tube feeds while patient is intubated, once able to extubate will need to re-evaluate appropriateness for NJ placement.  Endocrine: Steroid induced hyperglycemia, now longer requiring insulin drip, may switch to sliding scale insulin.  Fluid/Electrolytes: MIVF: GAMAL- will continue CRRT at this time, plan to remove some fluid today as able  : Forrester catheter removed, continue to bladder  scan.  Infectious disease: Ppx with zosyn/micafungin  Prophylaxis: DVT, fall, GI, fungal  Disposition: 4A, appreciate critical cares per SICU team    Medical Decision Making: Medium  Subsequent visit 07706 (moderate level decision making)    VEENA/Fellow/Resident Provider: Mary Braxton PA-C     Faculty: Mandeep Alford M.D.    __________________________________________________________________  Transplant History: Admitted 8/16/2019 for acute decompensated ANGULO.   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    8/18/2019 (Liver), Postoperative day: 3     Interval History: Unable to obtain a history from the patient due to critical condition, intubation   Overnight events: POD 3 after liver transplant, re-bleeding from L nasal mucosa after packing removed, re-packed.    ROS:   A 10-point review of systems was negative except as noted above.    Curent Meds:    aspirin  325 mg Oral Daily     [START ON 8/22/2019] basiliximab (SIMULECT) infusion  20 mg Intravenous Once     carboxymethylcellulose PF  2 drop Both Eyes Q6H     famotidine  20 mg Oral BID     insulin aspart  1-6 Units Subcutaneous Q4H HATTIE     levothyroxine  125 mcg Oral Daily     micafungin  100 mg Intravenous Q24H     multivitamins w/minerals  15 mL Per Feeding Tube Daily     mycophenolate  750 mg Oral BID IS    Or     mycophenolate  750 mg Oral or NG Tube BID IS     oxymetazoline  2 spray Both Nostrils BID     polyethylene glycol  17 g Oral BID     [START ON 8/22/2019] predniSONE  25 mg Oral or Feeding Tube Once    Followed by     [START ON 8/23/2019] predniSONE  10 mg Oral or Feeding Tube Once     protein modular  1 packet Per Feeding Tube TID     senna-docusate  1 tablet Oral BID     sodium chloride (PF)  3 mL Intravenous Q8H     sodium chloride (PF)  3 mL Intracatheter Q8H     tacrolimus  1 mg Per Feeding Tube BID IS     valGANciclovir  450 mg Oral Every Other Day    Or     valGANciclovir  450 mg Oral or NG  Tube Every Other Day     vitamin D3  1,000 Units Oral BID       Physical Exam:     Admit Weight: 104.3 kg (230 lb)    Current Vitals:   /65 (BP Location: Right arm)   Pulse 78   Temp 98.3  F (36.8  C) (Axillary)   Resp 16   Wt 111.2 kg (245 lb 2.4 oz)   SpO2 100%   BMI 44.84 kg/m      CVP (mmHg): 5 mmHg    Vital sign ranges:    Temp:  [97.4  F (36.3  C)-98.5  F (36.9  C)] 98.3  F (36.8  C)  Heart Rate:  [60-84] 74  Resp:  [16-26] 16  MAP:  [68 mmHg-89 mmHg] 88 mmHg  Arterial Line BP: (101-133)/(45-67) 124/67  FiO2 (%):  [35 %] 35 %  SpO2:  [98 %-100 %] 100 %  Patient Vitals for the past 24 hrs:   Temp Temp src Heart Rate Resp SpO2 Weight   08/21/19 1500 -- -- 74 16 100 % --   08/21/19 1400 -- -- 63 16 100 % --   08/21/19 1330 98.3  F (36.8  C) Axillary 71 16 100 % --   08/21/19 1300 98.3  F (36.8  C) Axillary 70 16 100 % --   08/21/19 1200 98.2  F (36.8  C) Axillary 84 17 99 % --   08/21/19 1145 98.5  F (36.9  C) Axillary 69 18 100 % --   08/21/19 1130 98.5  F (36.9  C) Axillary 67 18 100 % --   08/21/19 1115 98.3  F (36.8  C) Axillary 67 17 100 % --   08/21/19 1100 -- -- 65 17 100 % --   08/21/19 1000 -- -- 73 16 100 % --   08/21/19 0900 -- -- 72 17 100 % --   08/21/19 0800 97.9  F (36.6  C) Axillary 67 19 100 % --   08/21/19 0700 -- -- 73 16 100 % --   08/21/19 0600 -- -- 84 26 99 % --   08/21/19 0500 -- -- 61 16 100 % --   08/21/19 0400 97.7  F (36.5  C) Axillary 61 16 100 % 111.2 kg (245 lb 2.4 oz)   08/21/19 0300 -- -- 60 19 100 % --   08/21/19 0200 -- -- 61 17 100 % --   08/21/19 0149 -- -- -- -- 100 % --   08/21/19 0100 -- -- 72 17 100 % --   08/21/19 0030 97.7  F (36.5  C) -- 62 -- 100 % --   08/21/19 0000 97.6  F (36.4  C) Axillary 72 19 100 % --   08/20/19 2340 98.3  F (36.8  C) Axillary 68 -- 100 % --   08/20/19 2330 97.4  F (36.3  C) Axillary 66 16 100 % --   08/20/19 2300 -- -- 62 17 100 % --   08/20/19 2200 -- -- 76 -- 100 % --   08/20/19 2100 -- -- 66 16 100 % --   08/20/19 2000 98.2  F  (36.8  C) Axillary 74 24 100 % --   08/20/19 1900 -- -- 64 16 100 % --   08/20/19 1800 -- -- 64 16 100 % --   08/20/19 1700 -- -- 70 16 99 % --   08/20/19 1600 98.2  F (36.8  C) Axillary 77 16 98 % --     General Appearance: intubated  Skin: normal, warm  Heart: regular rate and rhythm  Lungs: intubated  Abdomen: soft, nondistended, incision sutured, c/d/i  : Forrester catheter removed, scant urine output  Extremities: edema: present bilaterally. 3+.  Neurologic: arousable, intermittently follows commands    Frailty Scores     There is no flowsheet data to display.          Data:   CMP  Recent Labs   Lab 08/21/19  0906 08/21/19  0449  08/19/19  0330  08/17/19  2341  08/16/19  1119    140   < > 137   < > 129*   < > 126*   POTASSIUM 4.4 4.5   < > 4.2   < > 4.5   < > 5.0   CHLORIDE 107 107   < > 105   < > 97   < > 94   CO2 26 24   < > 18*   < > 23   < > 25   GLC 96 100*   < > 173*   < > 91   < > 82   BUN 24 22   < > 27   < > 45*   < > 40*   CR 1.15* 1.16*   < > 1.33*   < > 2.04*   < > 2.30*   GFRESTIMATED 52* 51*   < > 44*   < > 26*   < > 22*   GFRESTBLACK 60* 60*   < > 51*   < > 30*   < > 26*   JACOB 7.8* 7.9*   < > 9.3   < > 8.4*   < > 8.2*   ICAW 4.6 4.6   < >  --    < >  --   --   --    MAG 2.5* 2.7*   < > 2.2   < > 2.5*  --   --    PHOS 4.5 4.7*   < > 4.4   < > 3.8  --   --    AMYLASE  --   --   --  326*  --  45  --   --    LIPASE  --   --   --  2,509*  --   --   --  364   ALBUMIN 2.2* 2.3*   < > 2.3*   < > 3.4   < > 1.6*   BILITOTAL 5.3* 5.3*   < > 8.9*   < > 26.9*   < > 24.2*   ALKPHOS 100 95   < > 46   < > 87   < > 152*   * 276*   < > 342*   < > 75*   < > Canceled, Test credited   * 399*   < > 238*   < > 34   < > 51*    < > = values in this interval not displayed.     CBC  Recent Labs   Lab 08/21/19  1417 08/21/19  0906 08/21/19  0449  08/18/19  2100   HGB 8.3* 8.6* 8.8*   < > 12.0   WBC  --  6.9 7.3   < > 8.4   PLT 59* 58* 63*   < > 142*   A1C  --   --   --   --  5.0    < > = values in this  interval not displayed.     COAGS  Recent Labs   Lab 08/21/19  0906 08/21/19  0449  08/18/19  2100  08/18/19  1758   INR 1.33* 1.23*   < > 1.88*   < > 2.79*   PTT  --   --   --  42*  --  103*    < > = values in this interval not displayed.      Urinalysis  Recent Labs   Lab Test 08/16/19  1755 06/18/18  1019   COLOR Dark Yellow Yellow   APPEARANCE Clear Clear   URINEGLC Negative Negative   URINEBILI Moderate* Negative   URINEKETONE Negative Negative   SG 1.007 1.014   UBLD Negative Negative   URINEPH 5.5 8.0*   PROTEIN Negative Negative   NITRITE Negative Negative   LEUKEST Negative Small*   RBCU 0 1   WBCU 1 8*   UTPG  --  Unable to calculate due to low value     Virology:  Hepatitis C Antibody   Date Value Ref Range Status   08/18/2019 Nonreactive NR^Nonreactive Final     Comment:     Assay performance characteristics have not been established for newborns,   infants, and children

## 2019-08-21 NOTE — PROVIDER NOTIFICATION
SICU MD Cruz notified plts 42. Were 48 earlier in day. MD verbalized understanding.     2305: clarified W/ MD Cruz will place order for 1 unit PLTs. Also clarified ye/ MD Cruz OK to leave insulin gtt off overnight if BG continues to be less than 150.

## 2019-08-21 NOTE — PROGRESS NOTES
Transplant Social Work Services Progress Note      Date of Initial Social Work Evaluation: 18  Collaborated with: Liver Transplant Team    Data: Nicci is three days post  donor liver transplant.  Intervention: I met with patient's  Hugo and provided supportive counseling and education.  We also discussed post-hospital course, role of social work, TCU/ARU options, parking passes, and applying for Social Security Disability benefits (stopped working early May 2019).  Assessment: Nicci remains intubated and sedated.  Her  is coping appropriately, and he says he can see improvement in her skin tone and medical updates have been positive.  Hugo is in agreement with TCU/ARU placement at discharge and he prefers [facility].  Hugo is retired, able and willing to provide care giving for Nicci when she returns home.  Education provided by SW: Liver Transplant Support Group, [facility] TCU/ARU  Plan:    Discharge Plans in Progress: Referral made to [facility] TCU/ARU admissions    Barriers to d/c plan: medical stability    Follow up Plan: I will remain available for the psychosocial needs of this patient/family.      ARIEL Valerio, [facility]  Liver Transplant   Phone [phone]  Pager [phone]

## 2019-08-21 NOTE — PROGRESS NOTES
CRRT STATUS NOTE    DATA:  Time:  5:08 AM  Pressures WNL:  YES  Filter Status:  WDL    Problems Reported/Alarms Noted:  NA    Supplies Present:  YES    ASSESSMENT:  Patient Net Fluid Balance:  8/20: -841 ml, 8/21: +50 ml  Vital Signs:  Vital signs:  Temp: 97.7  F (36.5  C) Temp src: Axillary     Heart Rate: 61 Resp: 16 SpO2: 100 % O2 Device: Mechanical Ventilator Oxygen Delivery: 1 LPM  /62 (83)   Labs:  Plat 42, ,   Goals of Therapy:  Net negative 0-100 as tolerated    INTERVENTIONS:   Checked in with bedside RN.    PLAN:  Continue CVVHDF per renal orders. Change set q72h and as needed. Contact CRRT RN with questions or concerns at [phone].

## 2019-08-21 NOTE — PROGRESS NOTES
Nephrology  Progress note- continuous dialysis visit  08/21/2019     Nicci Pemberton was seen once on CRRT for volume management and dialysis prescription. Nicci Pemberton's blood pressure has been stable and she is tolerating CRRT.  She has high drain output so nursing is not pulling very much with CRRT.  CVP elevated ~15.  BP stable and normal.  Remains intubated and sedated  Laboratory results and nurses' notes were reviewed.   No changes to management of volume, acidosis, or electrolytes.   Diagnosis - GAMAL  hypervolemia  Net negative already around 1.8L at time of my visit so I prefer to not increase UF rate on CRRT.  Likely to switch to conventional HD.  If filter goes down, do not restart CRRT.      Shari Goetz MD  [facility]  Department of Medicine  Division of Renal Disease and Hypertension  [phone]

## 2019-08-22 ENCOUNTER — APPOINTMENT (OUTPATIENT)
Dept: OCCUPATIONAL THERAPY | Facility: CLINIC | Age: 59
DRG: 005 | End: 2019-08-22
Payer: COMMERCIAL

## 2019-08-22 ENCOUNTER — APPOINTMENT (OUTPATIENT)
Dept: GENERAL RADIOLOGY | Facility: CLINIC | Age: 59
DRG: 005 | End: 2019-08-22
Attending: OBSTETRICS & GYNECOLOGY
Payer: COMMERCIAL

## 2019-08-22 LAB
ABO + RH BLD: NORMAL
ABO + RH BLD: NORMAL
ALBUMIN SERPL-MCNC: 2 G/DL (ref 3.4–5)
ALBUMIN SERPL-MCNC: 2 G/DL (ref 3.4–5)
ALBUMIN SERPL-MCNC: 2.1 G/DL (ref 3.4–5)
ALBUMIN SERPL-MCNC: 2.2 G/DL (ref 3.4–5)
ALP SERPL-CCNC: 116 U/L (ref 40–150)
ALP SERPL-CCNC: 119 U/L (ref 40–150)
ALP SERPL-CCNC: 126 U/L (ref 40–150)
ALP SERPL-CCNC: 134 U/L (ref 40–150)
ALT SERPL W P-5'-P-CCNC: 320 U/L (ref 0–50)
ALT SERPL W P-5'-P-CCNC: 387 U/L (ref 0–50)
ALT SERPL W P-5'-P-CCNC: 388 U/L (ref 0–50)
ALT SERPL W P-5'-P-CCNC: 408 U/L (ref 0–50)
ANION GAP SERPL CALCULATED.3IONS-SCNC: 4 MMOL/L (ref 3–14)
ANION GAP SERPL CALCULATED.3IONS-SCNC: 6 MMOL/L (ref 3–14)
ANION GAP SERPL CALCULATED.3IONS-SCNC: 7 MMOL/L (ref 3–14)
ANION GAP SERPL CALCULATED.3IONS-SCNC: 7 MMOL/L (ref 3–14)
ANISOCYTOSIS BLD QL SMEAR: SLIGHT
ANISOCYTOSIS BLD QL SMEAR: SLIGHT
AST SERPL W P-5'-P-CCNC: 156 U/L (ref 0–45)
AST SERPL W P-5'-P-CCNC: 194 U/L (ref 0–45)
AST SERPL W P-5'-P-CCNC: 238 U/L (ref 0–45)
AST SERPL W P-5'-P-CCNC: 243 U/L (ref 0–45)
BACTERIA SPEC CULT: NO GROWTH
BACTERIA SPEC CULT: NO GROWTH
BASE DEFICIT BLDA-SCNC: 0.1 MMOL/L
BASE DEFICIT BLDA-SCNC: 1.1 MMOL/L
BASE EXCESS BLDA CALC-SCNC: 0.6 MMOL/L
BASE EXCESS BLDA CALC-SCNC: 0.8 MMOL/L
BASOPHILS # BLD AUTO: 0 10E9/L (ref 0–0.2)
BASOPHILS NFR BLD AUTO: 0 %
BASOPHILS NFR BLD AUTO: 0.2 %
BILIRUB DIRECT SERPL-MCNC: 3.8 MG/DL (ref 0–0.2)
BILIRUB SERPL-MCNC: 4.4 MG/DL (ref 0.2–1.3)
BILIRUB SERPL-MCNC: 5 MG/DL (ref 0.2–1.3)
BILIRUB SERPL-MCNC: 5.4 MG/DL (ref 0.2–1.3)
BILIRUB SERPL-MCNC: 5.6 MG/DL (ref 0.2–1.3)
BLD GP AB SCN SERPL QL: NORMAL
BLD PROD TYP BPU: NORMAL
BLD UNIT ID BPU: 0
BLD UNIT ID BPU: 0
BLOOD BANK CMNT PATIENT-IMP: NORMAL
BLOOD PRODUCT CODE: NORMAL
BLOOD PRODUCT CODE: NORMAL
BPU ID: NORMAL
BPU ID: NORMAL
BUN SERPL-MCNC: 25 MG/DL (ref 7–30)
BUN SERPL-MCNC: 26 MG/DL (ref 7–30)
BUN SERPL-MCNC: 27 MG/DL (ref 7–30)
BUN SERPL-MCNC: 29 MG/DL (ref 7–30)
CA-I BLD-MCNC: 4.3 MG/DL (ref 4.4–5.2)
CA-I BLD-MCNC: 4.5 MG/DL (ref 4.4–5.2)
CA-I BLD-MCNC: 4.6 MG/DL (ref 4.4–5.2)
CA-I BLD-MCNC: 4.6 MG/DL (ref 4.4–5.2)
CALCIUM SERPL-MCNC: 7.5 MG/DL (ref 8.5–10.1)
CALCIUM SERPL-MCNC: 7.6 MG/DL (ref 8.5–10.1)
CHLORIDE SERPL-SCNC: 109 MMOL/L (ref 94–109)
CHLORIDE SERPL-SCNC: 109 MMOL/L (ref 94–109)
CHLORIDE SERPL-SCNC: 110 MMOL/L (ref 94–109)
CHLORIDE SERPL-SCNC: 110 MMOL/L (ref 94–109)
CO2 SERPL-SCNC: 24 MMOL/L (ref 20–32)
CO2 SERPL-SCNC: 25 MMOL/L (ref 20–32)
CO2 SERPL-SCNC: 26 MMOL/L (ref 20–32)
CO2 SERPL-SCNC: 28 MMOL/L (ref 20–32)
CREAT SERPL-MCNC: 1.01 MG/DL (ref 0.52–1.04)
CREAT SERPL-MCNC: 1.01 MG/DL (ref 0.52–1.04)
CREAT SERPL-MCNC: 1.07 MG/DL (ref 0.52–1.04)
CREAT SERPL-MCNC: 1.11 MG/DL (ref 0.52–1.04)
DIFFERENTIAL METHOD BLD: ABNORMAL
EOSINOPHIL # BLD AUTO: 0 10E9/L (ref 0–0.7)
EOSINOPHIL # BLD AUTO: 0.1 10E9/L (ref 0–0.7)
EOSINOPHIL NFR BLD AUTO: 0 %
EOSINOPHIL NFR BLD AUTO: 0 %
EOSINOPHIL NFR BLD AUTO: 0.3 %
EOSINOPHIL NFR BLD AUTO: 1 %
ERYTHROCYTE [DISTWIDTH] IN BLOOD BY AUTOMATED COUNT: 17.8 % (ref 10–15)
ERYTHROCYTE [DISTWIDTH] IN BLOOD BY AUTOMATED COUNT: 18 % (ref 10–15)
ERYTHROCYTE [DISTWIDTH] IN BLOOD BY AUTOMATED COUNT: 18.3 % (ref 10–15)
ERYTHROCYTE [DISTWIDTH] IN BLOOD BY AUTOMATED COUNT: 18.5 % (ref 10–15)
FIBRINOGEN PPP-MCNC: 317 MG/DL (ref 200–420)
FIBRINOGEN PPP-MCNC: 329 MG/DL (ref 200–420)
FIBRINOGEN PPP-MCNC: 331 MG/DL (ref 200–420)
FIBRINOGEN PPP-MCNC: 334 MG/DL (ref 200–420)
GFR SERPL CREATININE-BSD FRML MDRD: 54 ML/MIN/{1.73_M2}
GFR SERPL CREATININE-BSD FRML MDRD: 57 ML/MIN/{1.73_M2}
GFR SERPL CREATININE-BSD FRML MDRD: 61 ML/MIN/{1.73_M2}
GFR SERPL CREATININE-BSD FRML MDRD: 61 ML/MIN/{1.73_M2}
GLUCOSE BLDC GLUCOMTR-MCNC: 102 MG/DL (ref 70–99)
GLUCOSE BLDC GLUCOMTR-MCNC: 116 MG/DL (ref 70–99)
GLUCOSE BLDC GLUCOMTR-MCNC: 133 MG/DL (ref 70–99)
GLUCOSE BLDC GLUCOMTR-MCNC: 92 MG/DL (ref 70–99)
GLUCOSE BLDC GLUCOMTR-MCNC: 94 MG/DL (ref 70–99)
GLUCOSE SERPL-MCNC: 105 MG/DL (ref 70–99)
GLUCOSE SERPL-MCNC: 129 MG/DL (ref 70–99)
GLUCOSE SERPL-MCNC: 93 MG/DL (ref 70–99)
GLUCOSE SERPL-MCNC: 96 MG/DL (ref 70–99)
HCO3 BLD-SCNC: 25 MMOL/L (ref 21–28)
HCO3 BLD-SCNC: 26 MMOL/L (ref 21–28)
HCO3 BLD-SCNC: 27 MMOL/L (ref 21–28)
HCO3 BLD-SCNC: 27 MMOL/L (ref 21–28)
HCT VFR BLD AUTO: 23.8 % (ref 35–47)
HCT VFR BLD AUTO: 26 % (ref 35–47)
HCT VFR BLD AUTO: 28.3 % (ref 35–47)
HCT VFR BLD AUTO: 30.1 % (ref 35–47)
HGB BLD-MCNC: 7.6 G/DL (ref 11.7–15.7)
HGB BLD-MCNC: 8.3 G/DL (ref 11.7–15.7)
HGB BLD-MCNC: 9.2 G/DL (ref 11.7–15.7)
HGB BLD-MCNC: 9.3 G/DL (ref 11.7–15.7)
IMM GRANULOCYTES # BLD: 0.1 10E9/L (ref 0–0.4)
IMM GRANULOCYTES # BLD: 0.1 10E9/L (ref 0–0.4)
IMM GRANULOCYTES NFR BLD: 0.9 %
IMM GRANULOCYTES NFR BLD: 1.7 %
INR PPP: 1.26 (ref 0.86–1.14)
INR PPP: 1.28 (ref 0.86–1.14)
INR PPP: 1.28 (ref 0.86–1.14)
INR PPP: 1.29 (ref 0.86–1.14)
LACTATE BLD-SCNC: 0.6 MMOL/L (ref 0.7–2)
LYMPHOCYTES # BLD AUTO: 0 10E9/L (ref 0.8–5.3)
LYMPHOCYTES # BLD AUTO: 0.1 10E9/L (ref 0.8–5.3)
LYMPHOCYTES # BLD AUTO: 0.2 10E9/L (ref 0.8–5.3)
LYMPHOCYTES # BLD AUTO: 0.2 10E9/L (ref 0.8–5.3)
LYMPHOCYTES NFR BLD AUTO: 0 %
LYMPHOCYTES NFR BLD AUTO: 0.9 %
LYMPHOCYTES NFR BLD AUTO: 2.6 %
LYMPHOCYTES NFR BLD AUTO: 3.4 %
Lab: NORMAL
Lab: NORMAL
MAGNESIUM SERPL-MCNC: 2.5 MG/DL (ref 1.6–2.3)
MAGNESIUM SERPL-MCNC: 2.6 MG/DL (ref 1.6–2.3)
MCH RBC QN AUTO: 29.6 PG (ref 26.5–33)
MCH RBC QN AUTO: 30.2 PG (ref 26.5–33)
MCH RBC QN AUTO: 30.6 PG (ref 26.5–33)
MCH RBC QN AUTO: 31.2 PG (ref 26.5–33)
MCHC RBC AUTO-ENTMCNC: 30.6 G/DL (ref 31.5–36.5)
MCHC RBC AUTO-ENTMCNC: 31.9 G/DL (ref 31.5–36.5)
MCHC RBC AUTO-ENTMCNC: 31.9 G/DL (ref 31.5–36.5)
MCHC RBC AUTO-ENTMCNC: 32.9 G/DL (ref 31.5–36.5)
MCV RBC AUTO: 94 FL (ref 78–100)
MCV RBC AUTO: 95 FL (ref 78–100)
MCV RBC AUTO: 96 FL (ref 78–100)
MCV RBC AUTO: 97 FL (ref 78–100)
MONOCYTES # BLD AUTO: 0.5 10E9/L (ref 0–1.3)
MONOCYTES # BLD AUTO: 0.6 10E9/L (ref 0–1.3)
MONOCYTES # BLD AUTO: 0.7 10E9/L (ref 0–1.3)
MONOCYTES # BLD AUTO: 0.9 10E9/L (ref 0–1.3)
MONOCYTES NFR BLD AUTO: 11.4 %
MONOCYTES NFR BLD AUTO: 6 %
MONOCYTES NFR BLD AUTO: 9.5 %
MONOCYTES NFR BLD AUTO: 9.6 %
NEUTROPHILS # BLD AUTO: 5.6 10E9/L (ref 1.6–8.3)
NEUTROPHILS # BLD AUTO: 6.9 10E9/L (ref 1.6–8.3)
NEUTROPHILS # BLD AUTO: 7 10E9/L (ref 1.6–8.3)
NEUTROPHILS # BLD AUTO: 7.6 10E9/L (ref 1.6–8.3)
NEUTROPHILS NFR BLD AUTO: 84.1 %
NEUTROPHILS NFR BLD AUTO: 84.8 %
NEUTROPHILS NFR BLD AUTO: 89.6 %
NEUTROPHILS NFR BLD AUTO: 94 %
NRBC # BLD AUTO: 0 10*3/UL
NRBC # BLD AUTO: 0 10*3/UL
NRBC # BLD AUTO: 0.1 10*3/UL
NRBC BLD AUTO-RTO: 0 /100
NRBC BLD AUTO-RTO: 0 /100
NRBC BLD AUTO-RTO: 1 /100
NUM BPU REQUESTED: 1
NUM BPU REQUESTED: 1
O2/TOTAL GAS SETTING VFR VENT: 21 %
O2/TOTAL GAS SETTING VFR VENT: 30 %
OXYHGB MFR BLD: 94 % (ref 92–100)
PCO2 BLD: 41 MM HG (ref 35–45)
PCO2 BLD: 47 MM HG (ref 35–45)
PCO2 BLD: 48 MM HG (ref 35–45)
PCO2 BLD: 53 MM HG (ref 35–45)
PH BLD: 7.31 PH (ref 7.35–7.45)
PH BLD: 7.33 PH (ref 7.35–7.45)
PH BLD: 7.36 PH (ref 7.35–7.45)
PH BLD: 7.4 PH (ref 7.35–7.45)
PHOSPHATE SERPL-MCNC: 3.3 MG/DL (ref 2.5–4.5)
PHOSPHATE SERPL-MCNC: 4.1 MG/DL (ref 2.5–4.5)
PHOSPHATE SERPL-MCNC: 4.3 MG/DL (ref 2.5–4.5)
PHOSPHATE SERPL-MCNC: 4.5 MG/DL (ref 2.5–4.5)
PLATELET # BLD AUTO: 47 10E9/L (ref 150–450)
PLATELET # BLD AUTO: 52 10E9/L (ref 150–450)
PLATELET # BLD AUTO: 54 10E9/L (ref 150–450)
PLATELET # BLD AUTO: 57 10E9/L (ref 150–450)
PLATELET # BLD EST: ABNORMAL 10*3/UL
PLATELET # BLD EST: ABNORMAL 10*3/UL
PO2 BLD: 111 MM HG (ref 80–105)
PO2 BLD: 80 MM HG (ref 80–105)
PO2 BLD: 83 MM HG (ref 80–105)
PO2 BLD: 84 MM HG (ref 80–105)
POLYCHROMASIA BLD QL SMEAR: SLIGHT
POLYCHROMASIA BLD QL SMEAR: SLIGHT
POTASSIUM SERPL-SCNC: 4.2 MMOL/L (ref 3.4–5.3)
POTASSIUM SERPL-SCNC: 4.4 MMOL/L (ref 3.4–5.3)
POTASSIUM SERPL-SCNC: 4.5 MMOL/L (ref 3.4–5.3)
POTASSIUM SERPL-SCNC: 4.5 MMOL/L (ref 3.4–5.3)
PROT SERPL-MCNC: 4.4 G/DL (ref 6.8–8.8)
PROT SERPL-MCNC: 4.5 G/DL (ref 6.8–8.8)
PROT SERPL-MCNC: 4.6 G/DL (ref 6.8–8.8)
PROT SERPL-MCNC: 4.7 G/DL (ref 6.8–8.8)
RBC # BLD AUTO: 2.52 10E12/L (ref 3.8–5.2)
RBC # BLD AUTO: 2.71 10E12/L (ref 3.8–5.2)
RBC # BLD AUTO: 2.98 10E12/L (ref 3.8–5.2)
RBC # BLD AUTO: 3.11 10E12/L (ref 3.8–5.2)
SODIUM SERPL-SCNC: 140 MMOL/L (ref 133–144)
SODIUM SERPL-SCNC: 141 MMOL/L (ref 133–144)
SODIUM SERPL-SCNC: 142 MMOL/L (ref 133–144)
SODIUM SERPL-SCNC: 142 MMOL/L (ref 133–144)
SPECIMEN EXP DATE BLD: NORMAL
SPECIMEN SOURCE: NORMAL
SPECIMEN SOURCE: NORMAL
TACROLIMUS BLD-MCNC: <3 UG/L (ref 5–15)
TME LAST DOSE: ABNORMAL H
TRANSFUSION STATUS PATIENT QL: NORMAL
WBC # BLD AUTO: 6.6 10E9/L (ref 4–11)
WBC # BLD AUTO: 7.8 10E9/L (ref 4–11)
WBC # BLD AUTO: 8.1 10E9/L (ref 4–11)
WBC # BLD AUTO: 8.2 10E9/L (ref 4–11)

## 2019-08-22 PROCEDURE — 82330 ASSAY OF CALCIUM: CPT | Performed by: STUDENT IN AN ORGANIZED HEALTH CARE EDUCATION/TRAINING PROGRAM

## 2019-08-22 PROCEDURE — 82803 BLOOD GASES ANY COMBINATION: CPT | Performed by: NURSE PRACTITIONER

## 2019-08-22 PROCEDURE — 25000132 ZZH RX MED GY IP 250 OP 250 PS 637: Performed by: NURSE PRACTITIONER

## 2019-08-22 PROCEDURE — 94003 VENT MGMT INPAT SUBQ DAY: CPT

## 2019-08-22 PROCEDURE — 40000014 ZZH STATISTIC ARTERIAL MONITORING DAILY

## 2019-08-22 PROCEDURE — 85025 COMPLETE CBC W/AUTO DIFF WBC: CPT | Performed by: GENERAL ACUTE CARE HOSPITAL

## 2019-08-22 PROCEDURE — 20000004 ZZH R&B ICU UMMC

## 2019-08-22 PROCEDURE — 27210429 ZZH NUTRITION PRODUCT INTERMEDIATE LITER

## 2019-08-22 PROCEDURE — 25800030 ZZH RX IP 258 OP 636: Performed by: SURGERY

## 2019-08-22 PROCEDURE — P9037 PLATE PHERES LEUKOREDU IRRAD: HCPCS | Performed by: STUDENT IN AN ORGANIZED HEALTH CARE EDUCATION/TRAINING PROGRAM

## 2019-08-22 PROCEDURE — 80053 COMPREHEN METABOLIC PANEL: CPT | Performed by: GENERAL ACUTE CARE HOSPITAL

## 2019-08-22 PROCEDURE — 40000275 ZZH STATISTIC RCP TIME EA 10 MIN

## 2019-08-22 PROCEDURE — 83735 ASSAY OF MAGNESIUM: CPT | Performed by: GENERAL ACUTE CARE HOSPITAL

## 2019-08-22 PROCEDURE — 90947 DIALYSIS REPEATED EVAL: CPT

## 2019-08-22 PROCEDURE — P9016 RBC LEUKOCYTES REDUCED: HCPCS

## 2019-08-22 PROCEDURE — 25000131 ZZH RX MED GY IP 250 OP 636 PS 637: Performed by: PHYSICIAN ASSISTANT

## 2019-08-22 PROCEDURE — 85384 FIBRINOGEN ACTIVITY: CPT | Performed by: STUDENT IN AN ORGANIZED HEALTH CARE EDUCATION/TRAINING PROGRAM

## 2019-08-22 PROCEDURE — 85610 PROTHROMBIN TIME: CPT | Performed by: STUDENT IN AN ORGANIZED HEALTH CARE EDUCATION/TRAINING PROGRAM

## 2019-08-22 PROCEDURE — 82330 ASSAY OF CALCIUM: CPT | Performed by: NURSE PRACTITIONER

## 2019-08-22 PROCEDURE — 25000125 ZZHC RX 250: Performed by: INTERNAL MEDICINE

## 2019-08-22 PROCEDURE — 85384 FIBRINOGEN ACTIVITY: CPT | Performed by: GENERAL ACUTE CARE HOSPITAL

## 2019-08-22 PROCEDURE — 25000132 ZZH RX MED GY IP 250 OP 250 PS 637: Performed by: STUDENT IN AN ORGANIZED HEALTH CARE EDUCATION/TRAINING PROGRAM

## 2019-08-22 PROCEDURE — 82248 BILIRUBIN DIRECT: CPT | Performed by: STUDENT IN AN ORGANIZED HEALTH CARE EDUCATION/TRAINING PROGRAM

## 2019-08-22 PROCEDURE — 25000132 ZZH RX MED GY IP 250 OP 250 PS 637: Performed by: SURGERY

## 2019-08-22 PROCEDURE — 40000986 XR ABDOMEN PORT 1 VW

## 2019-08-22 PROCEDURE — 84100 ASSAY OF PHOSPHORUS: CPT | Performed by: STUDENT IN AN ORGANIZED HEALTH CARE EDUCATION/TRAINING PROGRAM

## 2019-08-22 PROCEDURE — 00000146 ZZHCL STATISTIC GLUCOSE BY METER IP

## 2019-08-22 PROCEDURE — 40000196 ZZH STATISTIC RAPCV CVP MONITORING

## 2019-08-22 PROCEDURE — 83605 ASSAY OF LACTIC ACID: CPT | Performed by: NURSE PRACTITIONER

## 2019-08-22 PROCEDURE — 80197 ASSAY OF TACROLIMUS: CPT | Performed by: PHYSICIAN ASSISTANT

## 2019-08-22 PROCEDURE — 44500 INTRO GASTROINTESTINAL TUBE: CPT

## 2019-08-22 PROCEDURE — 25000128 H RX IP 250 OP 636: Performed by: PHYSICIAN ASSISTANT

## 2019-08-22 PROCEDURE — 85610 PROTHROMBIN TIME: CPT | Performed by: GENERAL ACUTE CARE HOSPITAL

## 2019-08-22 PROCEDURE — 97530 THERAPEUTIC ACTIVITIES: CPT | Mod: GO | Performed by: OCCUPATIONAL THERAPIST

## 2019-08-22 PROCEDURE — P9047 ALBUMIN (HUMAN), 25%, 50ML: HCPCS | Performed by: PHYSICIAN ASSISTANT

## 2019-08-22 PROCEDURE — 25000131 ZZH RX MED GY IP 250 OP 636 PS 637: Performed by: SURGERY

## 2019-08-22 PROCEDURE — 83735 ASSAY OF MAGNESIUM: CPT | Performed by: STUDENT IN AN ORGANIZED HEALTH CARE EDUCATION/TRAINING PROGRAM

## 2019-08-22 PROCEDURE — 25000128 H RX IP 250 OP 636: Performed by: NURSE PRACTITIONER

## 2019-08-22 PROCEDURE — 85025 COMPLETE CBC W/AUTO DIFF WBC: CPT | Performed by: STUDENT IN AN ORGANIZED HEALTH CARE EDUCATION/TRAINING PROGRAM

## 2019-08-22 PROCEDURE — 82805 BLOOD GASES W/O2 SATURATION: CPT | Performed by: STUDENT IN AN ORGANIZED HEALTH CARE EDUCATION/TRAINING PROGRAM

## 2019-08-22 PROCEDURE — 82803 BLOOD GASES ANY COMBINATION: CPT | Performed by: STUDENT IN AN ORGANIZED HEALTH CARE EDUCATION/TRAINING PROGRAM

## 2019-08-22 PROCEDURE — 25000128 H RX IP 250 OP 636: Performed by: OBSTETRICS & GYNECOLOGY

## 2019-08-22 PROCEDURE — 97168 OT RE-EVAL EST PLAN CARE: CPT | Mod: GO | Performed by: OCCUPATIONAL THERAPIST

## 2019-08-22 PROCEDURE — 80053 COMPREHEN METABOLIC PANEL: CPT | Performed by: STUDENT IN AN ORGANIZED HEALTH CARE EDUCATION/TRAINING PROGRAM

## 2019-08-22 PROCEDURE — 84100 ASSAY OF PHOSPHORUS: CPT | Performed by: GENERAL ACUTE CARE HOSPITAL

## 2019-08-22 PROCEDURE — 25000128 H RX IP 250 OP 636: Performed by: SURGERY

## 2019-08-22 RX ORDER — ALBUMIN (HUMAN) 12.5 G/50ML
25 SOLUTION INTRAVENOUS EVERY 6 HOURS
Status: DISPENSED | OUTPATIENT
Start: 2019-08-22 | End: 2019-08-23

## 2019-08-22 RX ORDER — OXYMETAZOLINE HYDROCHLORIDE 0.05 G/100ML
2 SPRAY NASAL 2 TIMES DAILY PRN
Status: DISCONTINUED | OUTPATIENT
Start: 2019-08-22 | End: 2019-08-23

## 2019-08-22 RX ORDER — METOCLOPRAMIDE HYDROCHLORIDE 5 MG/ML
10 INJECTION INTRAMUSCULAR; INTRAVENOUS ONCE
Status: COMPLETED | OUTPATIENT
Start: 2019-08-22 | End: 2019-08-22

## 2019-08-22 RX ADMIN — ALBUMIN HUMAN 25 G: 0.25 SOLUTION INTRAVENOUS at 16:14

## 2019-08-22 RX ADMIN — CALCIUM CHLORIDE, MAGNESIUM CHLORIDE, SODIUM CHLORIDE, SODIUM BICARBONATE, POTASSIUM CHLORIDE AND SODIUM PHOSPHATE DIBASIC DIHYDRATE 12.5 ML/KG/HR: 3.68; 3.05; 6.34; 3.09; .314; .187 INJECTION INTRAVENOUS at 00:01

## 2019-08-22 RX ADMIN — CALCIUM CHLORIDE, MAGNESIUM CHLORIDE, SODIUM CHLORIDE, SODIUM BICARBONATE, POTASSIUM CHLORIDE AND SODIUM PHOSPHATE DIBASIC DIHYDRATE 12.5 ML/KG/HR: 3.68; 3.05; 6.34; 3.09; .314; .187 INJECTION INTRAVENOUS at 14:42

## 2019-08-22 RX ADMIN — CALCIUM CHLORIDE, MAGNESIUM CHLORIDE, SODIUM CHLORIDE, SODIUM BICARBONATE, POTASSIUM CHLORIDE AND SODIUM PHOSPHATE DIBASIC DIHYDRATE 12.5 ML/KG/HR: 3.68; 3.05; 6.34; 3.09; .314; .187 INJECTION INTRAVENOUS at 11:05

## 2019-08-22 RX ADMIN — CALCIUM CHLORIDE, MAGNESIUM CHLORIDE, SODIUM CHLORIDE, SODIUM BICARBONATE, POTASSIUM CHLORIDE AND SODIUM PHOSPHATE DIBASIC DIHYDRATE 12.5 ML/KG/HR: 3.68; 3.05; 6.34; 3.09; .314; .187 INJECTION INTRAVENOUS at 07:35

## 2019-08-22 RX ADMIN — LEVOTHYROXINE SODIUM 125 MCG: 0.12 TABLET ORAL at 08:26

## 2019-08-22 RX ADMIN — MELATONIN 1000 UNITS: at 08:26

## 2019-08-22 RX ADMIN — HYDROMORPHONE HYDROCHLORIDE 0.5 MG: 1 INJECTION, SOLUTION INTRAMUSCULAR; INTRAVENOUS; SUBCUTANEOUS at 21:45

## 2019-08-22 RX ADMIN — Medication 2 DROP: at 23:50

## 2019-08-22 RX ADMIN — HYDROMORPHONE HYDROCHLORIDE 0.5 MG: 1 INJECTION, SOLUTION INTRAMUSCULAR; INTRAVENOUS; SUBCUTANEOUS at 23:25

## 2019-08-22 RX ADMIN — CALCIUM CHLORIDE, MAGNESIUM CHLORIDE, SODIUM CHLORIDE, SODIUM BICARBONATE, POTASSIUM CHLORIDE AND SODIUM PHOSPHATE DIBASIC DIHYDRATE 12.5 ML/KG/HR: 3.68; 3.05; 6.34; 3.09; .314; .187 INJECTION INTRAVENOUS at 20:55

## 2019-08-22 RX ADMIN — SODIUM CHLORIDE 20 MG: 9 INJECTION, SOLUTION INTRAVENOUS at 09:11

## 2019-08-22 RX ADMIN — METOCLOPRAMIDE 10 MG: 5 INJECTION, SOLUTION INTRAMUSCULAR; INTRAVENOUS at 13:47

## 2019-08-22 RX ADMIN — MELATONIN 1000 UNITS: at 19:57

## 2019-08-22 RX ADMIN — Medication 3 MG: at 08:26

## 2019-08-22 RX ADMIN — FAMOTIDINE 20 MG: 20 TABLET ORAL at 19:57

## 2019-08-22 RX ADMIN — CALCIUM CHLORIDE, MAGNESIUM CHLORIDE, SODIUM CHLORIDE, SODIUM BICARBONATE, POTASSIUM CHLORIDE AND SODIUM PHOSPHATE DIBASIC DIHYDRATE 1.89 ML/KG/HR: 3.68; 3.05; 6.34; 3.09; .314; .187 INJECTION INTRAVENOUS at 00:01

## 2019-08-22 RX ADMIN — CALCIUM CHLORIDE, MAGNESIUM CHLORIDE, SODIUM CHLORIDE, SODIUM BICARBONATE, POTASSIUM CHLORIDE AND SODIUM PHOSPHATE DIBASIC DIHYDRATE 1.89 ML/KG/HR: 3.68; 3.05; 6.34; 3.09; .314; .187 INJECTION INTRAVENOUS at 20:55

## 2019-08-22 RX ADMIN — FAMOTIDINE 20 MG: 20 TABLET ORAL at 08:26

## 2019-08-22 RX ADMIN — OXYCODONE HYDROCHLORIDE 5 MG: 5 TABLET ORAL at 19:56

## 2019-08-22 RX ADMIN — Medication 1 PACKET: at 19:57

## 2019-08-22 RX ADMIN — SENNOSIDES AND DOCUSATE SODIUM 1 TABLET: 8.6; 5 TABLET ORAL at 08:25

## 2019-08-22 RX ADMIN — ASPIRIN 325 MG ORAL TABLET 325 MG: 325 PILL ORAL at 08:25

## 2019-08-22 RX ADMIN — OXYCODONE HYDROCHLORIDE 5 MG: 5 TABLET ORAL at 08:34

## 2019-08-22 RX ADMIN — HYDROMORPHONE HYDROCHLORIDE 0.5 MG: 1 INJECTION, SOLUTION INTRAMUSCULAR; INTRAVENOUS; SUBCUTANEOUS at 05:20

## 2019-08-22 RX ADMIN — MYCOPHENOLATE MOFETIL 750 MG: 200 POWDER, FOR SUSPENSION ORAL at 18:34

## 2019-08-22 RX ADMIN — SENNOSIDES AND DOCUSATE SODIUM 1 TABLET: 8.6; 5 TABLET ORAL at 19:57

## 2019-08-22 RX ADMIN — Medication 1 PACKET: at 08:27

## 2019-08-22 RX ADMIN — Medication 2 DROP: at 06:13

## 2019-08-22 RX ADMIN — ALBUMIN HUMAN 25 G: 0.25 SOLUTION INTRAVENOUS at 21:45

## 2019-08-22 RX ADMIN — MICAFUNGIN SODIUM 100 MG: 10 INJECTION, POWDER, LYOPHILIZED, FOR SOLUTION INTRAVENOUS at 12:55

## 2019-08-22 RX ADMIN — OXYCODONE HYDROCHLORIDE 5 MG: 5 TABLET ORAL at 14:42

## 2019-08-22 RX ADMIN — POLYETHYLENE GLYCOL 3350 17 G: 17 POWDER, FOR SOLUTION ORAL at 08:25

## 2019-08-22 RX ADMIN — Medication 3 MG: at 18:34

## 2019-08-22 RX ADMIN — POLYETHYLENE GLYCOL 3350 17 G: 17 POWDER, FOR SOLUTION ORAL at 20:00

## 2019-08-22 RX ADMIN — MULTIVITAMIN 15 ML: LIQUID ORAL at 08:26

## 2019-08-22 RX ADMIN — Medication 2 DROP: at 18:35

## 2019-08-22 RX ADMIN — Medication 1 PACKET: at 13:48

## 2019-08-22 RX ADMIN — PREDNISONE 25 MG: 5 TABLET ORAL at 08:25

## 2019-08-22 RX ADMIN — HYDROMORPHONE HYDROCHLORIDE 0.5 MG: 1 INJECTION, SOLUTION INTRAMUSCULAR; INTRAVENOUS; SUBCUTANEOUS at 12:15

## 2019-08-22 RX ADMIN — MYCOPHENOLATE MOFETIL 750 MG: 200 POWDER, FOR SUSPENSION ORAL at 08:27

## 2019-08-22 ASSESSMENT — ACTIVITIES OF DAILY LIVING (ADL)
ADLS_ACUITY_SCORE: 26

## 2019-08-22 NOTE — PLAN OF CARE
D=Pt remain vented,wakes up easily to voice or touch. Heart rate in mid 70s -80s w/ rare PACs/PVCs. SBP in 110s ,cuff pressure correlates w/ A line.MAP >65.TF at 30cc/hr via OG increased to 40cc/hr at 06(goal- 45cc/hr,increase every 8 hoursup to goal).Abdominal incision dry sutures intact.,Bilateral abdominal OBED to bulb suction, left OBED putting out more than  Rt.both OBED sites leaking dsg changed.Pt swollen and ecchymotic all over her body.On CRRT circuit was changed at 0030 tolerating fluid removal of 80cc/hr.Pt intermittently following commands.pt got anxious at 0500,Dilaidid0.5mg IV was given.remains on Fentanyl 75mcg/hr.HGB at 0400 was 7.6,PLT was 54,MD was called, 1 unit of PRBC was oredred and started.Afebrile,vitals stable  P=Continue to closely monitor pt

## 2019-08-22 NOTE — PLAN OF CARE
Discharge Planner OT   4A REEVAL   Patient plan for discharge: rehab  Current status: Pt now POD 3 liver transplant, intubated and lethargic.  Facilitated up to chair for improved posture and pressure relief dependent via lift Ax3.  Barriers to return to prior living situation: precautions, deconditioning, edema, alertness  Recommendations for discharge: TCU  Rationale for recommendations: Pt will benefit from skilled therapy to return to IND living at her home with her , currently well below baseline with deconditioning and new precautions.        Entered by: Virgie Wooten 08/22/2019 3:15 PM

## 2019-08-22 NOTE — PROGRESS NOTES
Neuro- PERRL. Fentanyl @ 75mcg/hr. Arouses to voice. Moves all extremities independently and intermittently to commands. Appears anxious at times.  CV- 60-70s. MAPs >65 with no interventions. CVP 13. Pulses to BLE dopplered. Significant edema to hands and BLE. Afebrile.   Pulm-SIMV/PC 10/400/30%/PEEP 5/Ps 5/PC 20; small amount gómez secretions; lungs clear/coarse.  GI- OG w/  TF @ 30 ml/hr; No BM.  - Anuric. CRRT goal to pull 0-100ml/hr if BP tolerates. Insensible loss noted from JPs. instructed not to pull any more fluid until after midnight. Bladder scan Q shift.  Skin- Clamshell SILVIA. R arm mepliex. JPs x2 to bilateral abd. L OBED puts out more than R. Ecchymosis throughout. Petechiae.      Will continue to monitor and assess for changes.

## 2019-08-22 NOTE — PROVIDER NOTIFICATION
Notified Dr. Lyle regarding patien't CRRT status:    At 1800 - CRRT machine alarmed that the effluent bag was full. As this writer was changing the bag the filter abruptly clotted. Attempted to return blood but the blue port line was clotted off and unable to flush. When the dialysis line was disconnected from the blue port a long string of clot came out.    There was discussion about going to HD tomorrow.     At 1930 - clot was able to be withdrawn from the line and now line flushes and withdraws blood.

## 2019-08-22 NOTE — PROGRESS NOTES
Immunosuppression Note:    Nicci Pemberton is a 59 year old female who is seen today  for immunosuppression management     I, Mandeep Alford MD, I have examined the patient with the resident/PA/Fellow, discussed and agree with the note and findings.  I have reviewed today's vital signs, medications, labs and imaging. I reviewed the immunosuppression medications and levels. I spoke to the patient/family and explained below clinical details and answered all the questions      Transplant Surgery  Inpatient Daily Progress Note  08/22/2019    Assessment & Plan: Nicci Pemberton is a 60 yo female with a past medical history significant for end stage liver disease 2/2 ANGULO and alpha 1 anti-trypsin deficiency now s/p DD OLT on 8/18/19    Graft function: Good, AST and ALT appear to have peaked and now with slight improvement. Lactate has normalized. Repeat US showed good flow.   Will replace drain output/hypoalbuminemia with albumin x 4 doses for now.  Immunosuppression management:  mg BID, Tac level today <3, so tac increased to 3 mg BID. Tac level undetectable again today, will not increase further with recent change. Solu-medrol taper as ordered. Complexity of management:Medium. Contributing factors: anemia and induction  Hematology: Acute blood loss anemia. Transfusion goal hgb > 7 at this time. Hgb stable at this time. Last transfused 3 units 8/20 overnight.  Ongoing nasal bleeding, examination revealed no direct lesion, overall friable mucosa. Re-packed 8/21, ENT plans to leave packing in for 5 days. Re-bleeding with feeding tube placement, ENT re-packed around.  Cardiorespiratory: Failed extubation today due to retention, will continue to assess for extubation as able.  Pressors off. Hx of hypertension on spironolactone and bumex at home, will hold off at this time.  GI/Nutrition: NJ placement attempted today, is currently distal gastric placement, unable to be advanced further during placement due to kinking  and nasal bleeding.  Endocrine: Steroid induced hyperglycemia, resolving, on sliding scale insulin.  Fluid/Electrolytes: MIVF: GAMAL- will continue CRRT at this time, plan to remove some fluid today as able. Planning to transition to IHD tomorrow.  : Forrester catheter removed, continue to bladder scan.  Infectious disease: Ppx with micafungin, zosyn completed.  Prophylaxis: DVT, fall, GI, fungal  Disposition: 4A, appreciate critical cares per SICU team    Medical Decision Making: Medium  Subsequent visit 49856 (moderate level decision making)    VEENA/Fellow/Resident Provider: Mary Braxton PA-C     Faculty: Mandeep Alford M.D.    __________________________________________________________________  Transplant History: Admitted 8/16/2019 for acute decompensated ANGULO.   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    8/18/2019 (Liver), Postoperative day: 4     Interval History: Unable to obtain a history from the patient due to critical condition, intubation   Overnight events: POD 4 after liver transplant, persistent bleeding from nares with feeding tube placement. Re-packed.    ROS:   A 10-point review of systems was negative except as noted above.    Curent Meds:    albumin human  25 g Intravenous Q6H     aspirin  325 mg Oral Daily     carboxymethylcellulose PF  2 drop Both Eyes Q6H     famotidine  20 mg Oral BID     insulin aspart  1-6 Units Subcutaneous Q4H HATTIE     levothyroxine  125 mcg Oral Daily     micafungin  100 mg Intravenous Q24H     multivitamins w/minerals  15 mL Per Feeding Tube Daily     mycophenolate  750 mg Oral BID IS    Or     mycophenolate  750 mg Oral or NG Tube BID IS     polyethylene glycol  17 g Oral BID     [START ON 8/23/2019] predniSONE  10 mg Oral or Feeding Tube Once     protein modular  1 packet Per Feeding Tube TID     senna-docusate  1 tablet Oral BID     sodium chloride (PF)  3 mL Intravenous Q8H     sodium chloride (PF)  3 mL  Intracatheter Q8H     tacrolimus  3 mg Per Feeding Tube BID IS     valGANciclovir  450 mg Oral Every Other Day    Or     valGANciclovir  450 mg Oral or NG Tube Every Other Day     vitamin D3  1,000 Units Oral BID       Physical Exam:     Admit Weight: 104.3 kg (230 lb)    Current Vitals:   /67   Pulse 78   Temp 97.1  F (36.2  C) (Axillary)   Resp 15   Wt 108.2 kg (238 lb 8.6 oz)   SpO2 100%   BMI 43.63 kg/m      CVP (mmHg): 5 mmHg    Vital sign ranges:    Temp:  [97.1  F (36.2  C)-98.3  F (36.8  C)] 97.1  F (36.2  C)  Heart Rate:  [60-99] 77  Resp:  [8-19] 15  BP: (113-132)/(65-69) 125/67  MAP:  [74 mmHg-91 mmHg] 74 mmHg  Arterial Line BP: (116-132)/(44-68) 119/56  FiO2 (%):  [30 %-35 %] 30 %  SpO2:  [80 %-100 %] 100 %  Patient Vitals for the past 24 hrs:   BP Temp Temp src Heart Rate Resp SpO2 Weight   08/22/19 1500 -- -- -- 77 15 100 % --   08/22/19 1400 -- -- -- 81 8 97 % --   08/22/19 1300 -- -- -- 84 -- 96 % --   08/22/19 1200 -- -- -- 77 15 97 % --   08/22/19 1100 -- -- -- 80 9 98 % --   08/22/19 1000 -- -- -- 84 10 97 % --   08/22/19 0900 -- -- -- 76 8 99 % --   08/22/19 0831 125/67 -- -- 81 -- -- --   08/22/19 0800 -- 97.1  F (36.2  C) Axillary 81 14 99 % --   08/22/19 0700 -- -- -- 79 -- 100 % --   08/22/19 0600 -- -- -- 78 -- 99 % --   08/22/19 0545 -- 98.2  F (36.8  C) Axillary 80 -- 100 % --   08/22/19 0535 -- 97.6  F (36.4  C) Axillary 77 -- 100 % --   08/22/19 0515 -- -- -- 99 -- (!) 80 % --   08/22/19 0500 -- -- -- 74 16 100 % --   08/22/19 0450 -- -- -- -- -- 100 % --   08/22/19 0400 -- 98.3  F (36.8  C) Axillary 72 12 100 % 108.2 kg (238 lb 8.6 oz)   08/22/19 0300 -- -- -- 75 15 99 % --   08/22/19 0200 -- -- -- 75 16 99 % --   08/22/19 0113 -- -- -- -- -- 98 % --   08/22/19 0100 -- -- -- 76 12 97 % --   08/22/19 0045 113/66 -- -- 82 -- 97 % --   08/22/19 0030 -- 98.1  F (36.7  C) Axillary 78 18 97 % --   08/22/19 0000 -- 98  F (36.7  C) Axillary 75 16 97 % --   08/21/19 2300 -- -- -- 65  13 98 % --   08/21/19 2200 -- -- -- 77 10 99 % --   08/21/19 2100 -- -- -- 61 14 100 % --   08/21/19 2027 132/69 -- -- 67 -- -- --   08/21/19 2000 -- 98  F (36.7  C) Axillary 86 19 100 % --   08/21/19 1900 -- -- -- 60 16 100 % --   08/21/19 1800 -- -- -- 72 13 100 % --   08/21/19 1700 -- -- -- 66 16 100 % --   08/21/19 1618 113/65 -- -- 69 -- -- --   08/21/19 1600 -- 97.9  F (36.6  C) Axillary 70 11 100 % --     General Appearance: intubated  Skin: normal, warm  Heart: regular rate and rhythm  Lungs: intubated  Abdomen: soft, nondistended, incision sutured, c/d/i. JPs with serosanguinous output  : Forrester catheter removed, scant urine output  Extremities: edema: present bilaterally. 3+.  Neurologic: arousable, intermittently follows commands    Frailty Scores     There is no flowsheet data to display.          Data:   CMP  Recent Labs   Lab 08/22/19  1035 08/22/19  1032 08/22/19  0355 08/22/19  0341  08/19/19  0330  08/17/19  2341  08/16/19  1119   NA  --  142  --  140   < > 137   < > 129*   < > 126*   POTASSIUM  --  4.2  --  4.4   < > 4.2   < > 4.5   < > 5.0   CHLORIDE  --  110*  --  109   < > 105   < > 97   < > 94   CO2  --  28  --  24   < > 18*   < > 23   < > 25   GLC  --  93  --  96   < > 173*   < > 91   < > 82   BUN  --  25  --  27   < > 27   < > 45*   < > 40*   CR  --  1.07*  --  1.11*   < > 1.33*   < > 2.04*   < > 2.30*   GFRESTIMATED  --  57*  --  54*   < > 44*   < > 26*   < > 22*   GFRESTBLACK  --  66  --  63   < > 51*   < > 30*   < > 26*   JACOB  --  7.5*  --  7.5*   < > 9.3   < > 8.4*   < > 8.2*   ICAW 4.6  --  4.5  --    < >  --    < >  --   --   --    MAG  --  2.5*  --  2.6*   < > 2.2   < > 2.5*  --   --    PHOS  --  4.1  --  4.5   < > 4.4   < > 3.8  --   --    AMYLASE  --   --   --   --   --  326*  --  45  --   --    LIPASE  --   --   --   --   --  2,509*  --   --   --  364   ALBUMIN  --  2.1*  --  2.0*   < > 2.3*   < > 3.4   < > 1.6*   BILITOTAL  --  5.6*  --  5.0*   < > 8.9*   < > 26.9*   < > 24.2*    ALKPHOS  --  126  --  116   < > 46   < > 87   < > 152*   AST  --  238*  --  243*   < > 342*   < > 75*   < > Canceled, Test credited   ALT  --  408*  --  388*   < > 238*   < > 34   < > 51*    < > = values in this interval not displayed.     CBC  Recent Labs   Lab 08/22/19  1032 08/22/19  0341  08/18/19  2100   HGB 9.2* 7.6*   < > 12.0   WBC 8.2 7.8   < > 8.4   PLT 52* 54*   < > 142*   A1C  --   --   --  5.0    < > = values in this interval not displayed.     COAGS  Recent Labs   Lab 08/22/19  1032 08/22/19  0341  08/18/19  2100  08/18/19  1758   INR 1.28* 1.26*   < > 1.88*   < > 2.79*   PTT  --   --   --  42*  --  103*    < > = values in this interval not displayed.      Urinalysis  Recent Labs   Lab Test 08/16/19  1755 06/18/18  1019   COLOR Dark Yellow Yellow   APPEARANCE Clear Clear   URINEGLC Negative Negative   URINEBILI Moderate* Negative   URINEKETONE Negative Negative   SG 1.007 1.014   UBLD Negative Negative   URINEPH 5.5 8.0*   PROTEIN Negative Negative   NITRITE Negative Negative   LEUKEST Negative Small*   RBCU 0 1   WBCU 1 8*   UTPG  --  Unable to calculate due to low value     Virology:  Hepatitis C Antibody   Date Value Ref Range Status   08/18/2019 Nonreactive NR^Nonreactive Final     Comment:     Assay performance characteristics have not been established for newborns,   infants, and children

## 2019-08-22 NOTE — PLAN OF CARE
4A - HOLDING PT at this time due to increased lethargy and decreased participation with therapies, pt remains intubated and sedated at this time. OT following for ROM and ADLs as appropriate. Will initiate PT pending increased participation in therapies as appropriate.

## 2019-08-22 NOTE — PROGRESS NOTES
"SPIRITUAL HEALTH SERVICES  SPIRITUAL ASSESSMENT Progress Note  [facility] ([place]) 4A     REFERRAL SOURCE: Follow up from 8/17    Visited pt and her family members, introduced as the unit , had reflective conversation with family members and offered prayer.    Pt was with tubes and sleeping but her  narrated her history of illness and said that she is responding well.    Pt's  expressed feelings of gratitude to the care team and to all the people that are supporting his wife through prayer.    Pt's  would time and again go to Nicci and explain what was going on and the family expressed feelings of hopes that Nicci will make it through.    The family requested me to pray for healing and for \"Nicci to be Nicci again\" which I did.    PLAN: [facility] remains available for on-going support.     Ramsey Morales  Chaplain Resident  Pager  [phone]    "

## 2019-08-22 NOTE — PROGRESS NOTES
Otolaryngology progress Note  8/22/2019    S: ENT contacted by SICU requesting removal of nasal packing and NG placement. Patient now meeting criteria to extubate per report, only reason for intubation at this time would be nasal packing/lack of ability to gain enteral access once OG is removed with extubation. Also would like NG placed prior to extubation due to high risk of bleeding into the airway. Discussed that patient is at high risk of bleeding, normally would not recommend nasal packing removal this early, however decision left to SICU and they are requesting attempt despite high risk of bleeding.     O: Opens eyes to voice, orotracheally intubated, NAD. No anterior nasal bleeding, no blood in oropharynx.   The inferior gel foam and surgicel removed from right nasal passage under endoscopic guidance. Gel foam and surgicel around the middle turbinate was not disturbed. There was immediate diffuse oozing from the inferior nasal septum and lateral nasal wall/inferior turbinate. A 10 Spanish nasogastric feeding tube was placed along the right nasal floor, passed easily. Dietician attempted passage of the NG tube post-pyloric using [employer] System[employer] system, however, tube not passing through the pylorus. The tube was left at 65cm at the nasal opening and secured with NG tube anne sticker. Additional gel foam wrapped in Surgicel was placed on top of the NG tube along the entire nasal cavity and additional placed anteriorly along the septum. No anterior nasal bleeding at end of procedure. Oropharynx with small streaks of blood, but no active dripping.        - Packing placed is absorbable and does not require removal. Do not recommend attempting to remove or place any tubes/instrumentation through the nose at this time. No plans for removal of packing, this will dissolve on its own  - Would tolerated slow/small amounts of oozing from the nose over the next 24h given degree of mucosal injury and diffuse bleeding seen. If  bleeding becomes more brisk please contact ENT for repeat evaluation and consideration of more formal nasal packing to apply additional pressure (could consider merocel, rapid rhino, or epistat if very brisk)  - Recommend starting nasal saline spray tomorrow to b/l nasal passages Q4H   - May use Afrin nasal spray PRN for bleeding only, do not schedule due to risk of rebound nasal congestion with overuse  - Remainder of care per ICU    Tosha Ro PA-C  Otolaryngology-Head & Neck Surgery  Please contact ENT by dialing [phone] and entering job code [phone].

## 2019-08-22 NOTE — PROGRESS NOTES
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 Renal is following for volume and CRRT management.    # Non-oliguric GAMAL   Cr baseline: 0.6 mg/dl up to 2.3 on admission. Wt up 8 lbs from admission. DDx the etiology of GAMAL included HRS, intra-vascular volume depletion vs tubular bilirubinopathy vs abdominal compartment syndrome compression on renal artery causes decrease renal blood flow. Pt underwent liver transplant on . CRRT was initiated during the operation.   -- Continue CRRT with fluid removal (0-100 ml/hr). Patient with stable BP. Likely will switch to conventional HD in the upcoming days.    # Blood pressure, volume status: Off pressors since 6pm . BP has been stable with MAPs in 80s. Notable anasarca.   -- Continue CRRT    # Electrolytes, Acid-Base:  Na, K wnl. Bicarb: low normal.  - Continue CRRT.     # Anemia:  Patient did have a significant amount of bleeding in her oral cavity and from the nose. Followed by ENT. Lost 5000 ml of blood during the operation. Received 8L crystalloid, 3.6L cellsaver, 4 cryo, 13 FFP, 14 pRBC, 10 plt intra-op. Hgb 7.8 today (). Has been getting blood transfusion with the goal Hgb > 8.   - Transfusion per primary team.     # ESLD secondary to ANGULO s/p OLT  (POD#1)  - Managed by liver transplant team.     Recommendations were communicated to primary team via note.    Discussed with Dr. Goetz.    Roger Warren MD   Internal Medicine, PGY3  [phone]    -------------------------------------------------------------    Interval History :   -  Care team notes reviewed.  - More awake. SICU team is working towards extubation .   - ENT follows for nasal bleeding.   - Tolerated CRRT well. Has nearly 3L of output from drains.     Review of Systems:   (4 pt ROS reviewed alone is not adequate unless you detail systems you reviewed)  Intubated and sedated. Unable to obtain ROS.    Physical Exam:   I/O last 3 completed shifts:  In: 1644 [I.V.:437; Other:22; NG/GT:430]  Out: 4033 [Drains:3050; Other:983]   /67   Pulse 78   Temp 97.1  F (36.2  C) (Axillary)   Resp 15   Wt 108.2 kg (238 lb 8.6 oz)   SpO2 100%   BMI 43.63 kg/m       GENERAL APPEARANCE: jaundice, sedated, intubated, anasarca  HEENT: scleral icterus, has nasal and oral packings  Pulmonary: intubated, synchronized breathing with the vent  CV: regular rhythm, normal rate, no rub   - JVD not elevated   - 3+ pitting edema from both feet up to both thigh  GI: s/p OLT, incision was covered  EXT: 3+ pitting edema at all 4 extremities  : +durbin with yellowish urine  SKIN: Jaundice    Labs:   All labs reviewed by me  Electrolytes/Renal -   Recent Labs   Lab Test 08/22/19  1032 08/22/19  0341 08/21/19 2132    140 141   POTASSIUM 4.2 4.4 4.7   CHLORIDE 110* 109 107   CO2 28 24 28   BUN 25 27 28   CR 1.07* 1.11* 1.14*   GLC 93 96 98   JACOB 7.5* 7.5* 7.6*   MAG 2.5* 2.6* 2.6*   PHOS 4.1 4.5 5.1*       CBC -   Recent Labs   Lab Test 08/22/19  1032 08/22/19  0341 08/21/19 2132   WBC 8.2 7.8 8.1   HGB 9.2* 7.6* 8.5*   PLT 52* 54* 58*       LFTs -   Recent Labs   Lab Test 08/22/19  1032 08/22/19  0341 08/21/19 2132   ALKPHOS 126 116 120   BILITOTAL 5.6* 5.0* 5.6*   * 388* 422*   * 243* 274*   PROTTOTAL 4.6* 4.4* 5.0*   ALBUMIN 2.1* 2.0* 2.2*       Iron Panel -   Recent Labs   Lab Test 02/19/19  0825 06/18/18  1024   IRON 188* 220*   IRONSAT 95* 90*   DREW 825* 996*       Current Medications:    aspirin  325 mg Oral Daily     carboxymethylcellulose PF  2 drop Both Eyes Q6H      famotidine  20 mg Oral BID     insulin aspart  1-6 Units Subcutaneous Q4H [facility]     levothyroxine  125 mcg Oral Daily     micafungin  100 mg Intravenous Q24H     multivitamins w/minerals  15 mL Per Feeding Tube Daily     mycophenolate  750 mg Oral BID IS    Or     mycophenolate  750 mg Oral or NG Tube BID IS     polyethylene glycol  17 g Oral BID     [START ON 8/23/2019] predniSONE  10 mg Oral or Feeding Tube Once     protein modular  1 packet Per Feeding Tube TID     senna-docusate  1 tablet Oral BID     sodium chloride (PF)  3 mL Intravenous Q8H     sodium chloride (PF)  3 mL Intracatheter Q8H     tacrolimus  3 mg Per Feeding Tube BID IS     valGANciclovir  450 mg Oral Every Other Day    Or     valGANciclovir  450 mg Oral or NG Tube Every Other Day     vitamin D3  1,000 Units Oral BID       IV fluid REPLACEMENT ONLY       IV fluid REPLACEMENT ONLY       CRRT replacement solution 12.5 mL/kg/hr (08/22/19 1442)     fentaNYL Stopped (08/22/19 1324)     IV fluid REPLACEMENT ONLY       - MEDICATION INSTRUCTIONS -       CRRT replacement solution 1.892 mL/kg/hr (08/22/19 0001)     CRRT replacement solution 12.5 mL/kg/hr (08/22/19 1442)     BETA BLOCKER NOT PRESCRIBED       Roger Warren MD

## 2019-08-22 NOTE — PROGRESS NOTES
Pt underwent liver transplant on 8/18.  CRRT  Blue port line found clotted and unable to flush per RN   When dialysis line disconnected from the blue port line - a long string of clot  came out. Unable to restart CRRT .  Patient is net 2 litres negative  Fluid balance  Electrolytes stable - K 4.5 ,Sodium 141, Cl 109 , CO2 25, BUN 26  Calcium 7.5  On pressure support vent support  With FiO2 of 30% , saturating at 100 %.  Plan was to consider iHD in upcoming days .    PLAN :   Continue labs checks as per CRRT protocol  At this stage , we will hold CRRT and  consider iHD  In AM   Plan discussed with Dr Houser   Plan  discussed with RN Genet Ríos    Addendum   CCRT  RN reported that they were able to evaluate the clot easily and patient now has good flow. Flushes easily   We decided to continue CRRT as per usual protocol     Ej Lyle MD FACP  Nephrology Fellow   [facility]   Pager [phone]

## 2019-08-22 NOTE — PROGRESS NOTES
SURGICAL ICU PROGRESS NOTE  August 22, 2019      ASSESSMENT: Nicci Pemberton 59F w/ PMH of ESLD (MELD 39) 2/2 ANGULO and alpha-1-antitrypsin deficiency c/b cirrhosis, SBP, lymphedema, anemia s/p DDLT on 8/18/19, with hypovolemic/hemorrhagic shock.        CHANGES:  - transfused 1 unit PRBC today  - pressure support; plan to extubate today  - coordinate extubation plan with ENT and consider NJ tube placement  - bedside swallow study after extubation  - discontinue serial lactic acid checks       Neurological  # Acute postoperative pain  - Monitor neurological status. Delirium preventions and precautions.   - Pain: fentanyl, prn dilaudid, prn oxycodone, no acetaminophen  - Sedation: none     # hx of hepatic encephalopathy     Pulmonary  # Post operative ventilatory support  - VENT PC siMV 1/400/5/30: Continue full vent support. PST when meets criteria. Ventilatory bundle. HOB elevation  - passed PST yesterday afternoon     Cardiovascular  # Shock - hypovolemic/hemorrhagic  - Goal 100 < SBP >140, 8 < CVP <12  - Monitor hemodynamic status with arterial line  - Fluid and product resuscitation to achieve goal   - off Norepinephrine gtt for goal MAPs >65  - Dobutamine stress test 6/19/2019 negative for ischemia     # hx of HTN  - PTA spironolactone 100 mg BID, bumetanide 1 mg   - Hold while pressures soft     Gastroenterology/Nutrition  # ESLD 2/2 Alpha-1-antitrypsin and ANGULO s/p DDLT 8/18/19    # heterozygous (MZ genotype) for alpha-1-antitrypsin with secondary iron overload  # hyperbilirubinemia  # umbilical hernia  - MELD at transplant: 39  - most recent EGD 5/7/2019 without esophageal or gastric varices  - Q12h CMP, CBC  - bowel regimen: Miralax, Senna     # Protein calorie deficit malnutrition   - No indication for parenteral nutrition.  - Tube feeds at 40 ml/hr     - PTA famotidine 20 mg BID     Fluids/Electrolytes  - prn 0.9% sodium chloride bolus for IV fluid hydration     # hyponatremia  # hypocalcemia  - ICU  electrolyte replacement protocol     Renal  # acute kidney injury, baseline Cr ~0.6  # history of hepatorenal syndrome  - Monitor intake and output.  - Goal UOP >0.50 ml/kg/hr  - CRRT - remove fluids per nephrology recommendations  - Dialyzed intraoperatively through left internal jugular line, may require further dialysis pending progression of kidney function overnight     Endocrine  # Stress and steroid hyperglycemia   - Goal to keep BG< 180 for optimal wound healing   - insulin sliding scale     # hx of hypothyroidism  - PTA levothyroxine 125 mcg daily     ID  # Immunosuppression  - Continue micafungin   - Continue ppx: valcyte  - continue immunosuppression: methylprednisolone, mycophenolate, tacrolimus (initiated 8/19/2019)    - f/u cultures     # SBP  - Rifaximin discontinued (8/19/2019)     Heme  # Acute blood loss anemia, EBL 5L  # thrombocytopenia  # coagulopathy  - Transfuse if hgb <8.0 or signs/symptoms of hypoperfusion  - Goal INR <2, Fibrinogen >200, plt >[id number]  - Q4h hgb, PCT, fibrinogen, INR, ABG  - type and screen q72h  - hold DDAVP  - Aspirin 325mg     Musculoskeletal  # weakness and deconditioning of critical illness   # osteoarthritis  - Physical and occupational therapy consult when extubated     General Cares/Prophylaxis  DVT Prophylaxis: Pneumatic Compression Devices  GI Prophylaxis: pepcid BID   Restraints: No   Lines/ tubes/ drains:  - ETT  - L abd JPx1  - R abd OBED x1  - R internal jugular CVC  - L internal jugular dialysis catheter  - L radial arterial line  - PIVx2  Code: Full  Disposition: Surgical ICU     Patient seen, findings and plan discussed with surgical ICU staff, Dr. Allen.    Malachi Paredes MD   SICU Resident     ====================================    TODAY'S PROGRESS:   SUBJECTIVE:   Patient wakes to voice and passed her pressure support yesterday afternoon. Bladder scan continues to show low amounts of urine suggesting limited kidney function at the moment.        OBJECTIVE:   1. VITAL SIGNS:   Temp:  [97.1  F (36.2  C)-98.5  F (36.9  C)] 97.1  F (36.2  C)  Heart Rate:  [60-99] 76  Resp:  [8-19] 8  BP: (113-132)/(65-69) 125/67  MAP:  [75 mmHg-91 mmHg] 86 mmHg  Arterial Line BP: (116-132)/(44-68) 126/64  FiO2 (%):  [30 %-35 %] 30 %  SpO2:  [80 %-100 %] 99 %  Ventilation Mode: (S) CPAP/PS  (Continuous positive airway pressure with Pressure Support)  FiO2 (%): 30 %  Rate Set (breaths/minute): 10 breaths/min  Tidal Volume Set (mL): 400 mL  PEEP (cm H2O): 5 cmH2O  Pressure Support (cm H2O): 10 cmH2O  Oxygen Concentration (%): 35 %  Peak Inspiratory Pressure (cm H2O) (Drager Kianna): 20  Resp: 8      2. INTAKE/ OUTPUT:   I/O last 3 completed shifts:  In: 1779.43 [I.V.:395.43; Other:19; NG/GT:560]  Out: 4230 [Drains:2639; Other:1341; Blood:250]    3. PHYSICAL EXAMINATION:   General: follows simple commands  Neuro: NAD  Resp: intubated  CV: RRR  Abdomen: Soft, Non-distended, Non-tender  Incisions: covered by dressing  Extremities: anasarca    4. INVESTIGATIONS:   Arterial Blood Gases   Recent Labs   Lab 08/22/19  0355 08/21/19 2132 08/21/19  1614 08/21/19  0906   PH 7.36 7.35 7.40 7.43   PCO2 47* 47* 41 38   PO2 80 119* 113* 106*   HCO3 27 26 25 25     Complete Blood Count   Recent Labs   Lab 08/22/19  0341 08/21/19 2132 08/21/19 1614 08/21/19  1417 08/21/19  0906   WBC 7.8 8.1 8.0  --  6.9   HGB 7.6* 8.5* 8.6* 8.3* 8.6*   PLT 54* 58* 63* 59* 58*     Basic Metabolic Panel  Recent Labs   Lab 08/22/19 0341 08/21/19 2132 08/21/19  1614 08/21/19  0906    141 140 140   POTASSIUM 4.4 4.7 4.7 4.4   CHLORIDE 109 107 106 107   CO2 24 28 27 26   BUN 27 28 26 24   CR 1.11* 1.14* 1.12* 1.15*   GLC 96 98 97 96     Liver Function Tests  Recent Labs   Lab 08/22/19  0341 08/21/19 2132 08/21/19  1614 08/21/19  0906   * 274* 281* 272*   * 422* 428* 400*   ALKPHOS 116 120 119 100   BILITOTAL 5.0* 5.6* 5.6* 5.3*   ALBUMIN 2.0* 2.2* 2.3* 2.2*   INR 1.26* 1.26* 1.29* 1.33*      Pancreatic Enzymes  Recent Labs   Lab 08/19/19  0330 08/17/19  2341 08/16/19  1119   LIPASE 2,509*  --  364   AMYLASE 326* 45  --      Coagulation Profile  Recent Labs   Lab 08/22/19  0341 08/21/19  2132 08/21/19  1614 08/21/19  0906  08/18/19  2100  08/18/19  1758  08/18/19  1605  08/17/19  2341   INR 1.26* 1.26* 1.29* 1.33*   < > 1.88*   < > 2.79*   < > 3.38*   < > 3.64*   PTT  --   --   --   --   --  42*  --  103*  --  181*  --  66*    < > = values in this interval not displayed.         5. RADIOLOGY:   No results found for this or any previous visit (from the past 24 hour(s)).    =========================================      @[facility]@

## 2019-08-22 NOTE — PROGRESS NOTES
08/22/19 1400   Quick Adds   Type of Visit Occupational Therapy Re-evaluation   Living Environment   Lives With spouse   Living Arrangements house   Transportation Anticipated agency   Living Environment Comment Pt intubated, unable to gather full information. Pt was at TCU prior to admission with goal to return to house soon.   Self-Care   Usual Activity Tolerance fair   Current Activity Tolerance poor   Regular Exercise Yes   Activity/Exercise/Self-Care Comment Pt has been working with therapies at TCU prior to admission.    Functional Level   Prior Functional Level Comment Unknown, pt deconditioned    General Information   Onset of Illness/Injury or Date of Surgery - Date 08/16/19  (8/20 liver transplant)   Referring Physician Dr Allen   Patient/Family Goals Statement to get well enough to dc to home   Additional Occupational Profile Info/Pertinent History of Current Problem 59F w/ PMH of ESLD (MELD 39) 2/2 ANGULO and alpha-1-antitrypsin deficiency c/b cirrhosis, SBP, lymphedema, anemia s/p DDLT on 8/18/19, with hypovolemic/hemorrhagic shock.   Precautions/Limitations fall precautions;oxygen therapy device and L/min;abdominal precautions   General Observations PT supine, intubated and shaking. Family present   General Info Comments activity:  up w A   Cognitive Status Examination   Level of Consciousness lethargic/somnolent   Cognitive Comment intubated, somnolent    Range of Motion (ROM)   ROM Comment BLAIRE   Strength   Strength Comments BLAIRE   Hand Strength   Hand Strength Comments fair  strength   Mobility   Bed Mobility Comments dependent   Toileting   Level of [place]: Toilet dependent (less than 25% patients effort)   Grooming   Level of [place]: Grooming dependent (less than 25% patients effort)   Instrumental Activities of Daily Living (IADL)   Previous Responsibilities   (currently TCU)   Activities of Daily Living Analysis   Impairments Contributing to Impaired Activities of Daily Living  "balance impaired;cognition impaired;pain;ROM decreased;strength decreased;coordination impaired   General Therapy Interventions   Planned Therapy Interventions ADL retraining;IADL retraining;bed mobility training;cognition;ROM;strengthening   Clinical Impression   Criteria for Skilled Therapeutic Interventions Met yes, treatment indicated   OT Diagnosis deconditioned, liver transplant   Influenced by the following impairments post op precautions, deconditioning, pain   Assessment of Occupational Performance 5 or more Performance Deficits   Identified Performance Deficits all ADLs and IADLs are affected   Clinical Decision Making (Complexity) Moderate complexity   Therapy Frequency 5x/week   Predicted Duration of Therapy Intervention (days/wks) 3 weeks   Anticipated Discharge Disposition Transitional Care Facility   Risks and Benefits of Treatment have been explained. Yes   Patient, Family & other staff in agreement with plan of care Yes   [facility] AM-PAC  \"6 Clicks\" Daily Activity Inpatient Short Form   1. Putting on and taking off regular lower body clothing? 1 - Total   2. Bathing (including washing, rinsing, drying)? 1 - Total   3. Toileting, which includes using toilet, bedpan or urinal? 1 - Total   4. Putting on and taking off regular upper body clothing? 1 - Total   5. Taking care of personal grooming such as brushing teeth? 1 - Total   6. Eating meals? 1 - Total   Daily Activity Raw Score (Score out of 24.Lower scores equate to lower levels of function) 6   Total Evaluation Time   Total Evaluation Time (Minutes) 5     "

## 2019-08-22 NOTE — PROCEDURES
Small Bowel Feeding Tube Placement Assessment  Reason for Feeding Tube Placement: per team, plan for post pyloric feeding tube via nare. OG in place, begin fed gastrically, however plan to extubate patient and need for enteral access via nares.    Cortrak Start Time:11:48  Cortrak End Time: 12:05  Medicine Delivered During Procedure: lubricant   Placement Successful:  Presume gastric (pending AXR confirmation).  Procedure Complications: ENT needed to remove nare packing prior to placement of ppft. Increased bleeding from nares, oral and nare packing noted. No bridle as pt with bleeding in nares. Unable to place post pyloric as tube began kinking as it moved towards pylorus, unable to have long placement d/t bleeding/packing.   Final Placement Carlo at exit of nare 65 cm  Face to Face time with patient: 30 min    Peggy Calderon, RD, MS, LD  SICU: [phone]

## 2019-08-22 NOTE — PLAN OF CARE
Edema/4A: Hold. Pt continues to be intubated and inconsistently following commands, will hold and initiate edema services as appropriate.

## 2019-08-23 ENCOUNTER — APPOINTMENT (OUTPATIENT)
Dept: OCCUPATIONAL THERAPY | Facility: CLINIC | Age: 59
DRG: 005 | End: 2019-08-23
Payer: COMMERCIAL

## 2019-08-23 ENCOUNTER — APPOINTMENT (OUTPATIENT)
Dept: GENERAL RADIOLOGY | Facility: CLINIC | Age: 59
DRG: 005 | End: 2019-08-23
Attending: OBSTETRICS & GYNECOLOGY
Payer: COMMERCIAL

## 2019-08-23 ENCOUNTER — APPOINTMENT (OUTPATIENT)
Dept: GENERAL RADIOLOGY | Facility: CLINIC | Age: 59
DRG: 005 | End: 2019-08-23
Attending: NURSE PRACTITIONER
Payer: COMMERCIAL

## 2019-08-23 LAB
ALBUMIN SERPL-MCNC: 2.4 G/DL (ref 3.4–5)
ALBUMIN SERPL-MCNC: 2.6 G/DL (ref 3.4–5)
ALBUMIN SERPL-MCNC: 2.7 G/DL (ref 3.4–5)
ALBUMIN SERPL-MCNC: 4.2 G/DL (ref 3.4–5)
ALP SERPL-CCNC: 103 U/L (ref 40–150)
ALP SERPL-CCNC: 108 U/L (ref 40–150)
ALP SERPL-CCNC: 110 U/L (ref 40–150)
ALP SERPL-CCNC: 116 U/L (ref 40–150)
ALT SERPL W P-5'-P-CCNC: 194 U/L (ref 0–50)
ALT SERPL W P-5'-P-CCNC: 204 U/L (ref 0–50)
ALT SERPL W P-5'-P-CCNC: 235 U/L (ref 0–50)
ALT SERPL W P-5'-P-CCNC: 269 U/L (ref 0–50)
ANION GAP SERPL CALCULATED.3IONS-SCNC: 4 MMOL/L (ref 3–14)
ANION GAP SERPL CALCULATED.3IONS-SCNC: 5 MMOL/L (ref 3–14)
ANION GAP SERPL CALCULATED.3IONS-SCNC: 7 MMOL/L (ref 3–14)
ANION GAP SERPL CALCULATED.3IONS-SCNC: 9 MMOL/L (ref 3–14)
AST SERPL W P-5'-P-CCNC: 120 U/L (ref 0–45)
AST SERPL W P-5'-P-CCNC: 73 U/L (ref 0–45)
AST SERPL W P-5'-P-CCNC: 76 U/L (ref 0–45)
AST SERPL W P-5'-P-CCNC: 96 U/L (ref 0–45)
BACTERIA SPEC CULT: NO GROWTH
BASE EXCESS BLDA CALC-SCNC: 2.7 MMOL/L
BASOPHILS # BLD AUTO: 0 10E9/L (ref 0–0.2)
BASOPHILS NFR BLD AUTO: 0 %
BASOPHILS NFR BLD AUTO: 0 %
BASOPHILS NFR BLD AUTO: 0.2 %
BASOPHILS NFR BLD AUTO: 0.2 %
BILIRUB DIRECT SERPL-MCNC: 3.4 MG/DL (ref 0–0.2)
BILIRUB SERPL-MCNC: 4.4 MG/DL (ref 0.2–1.3)
BILIRUB SERPL-MCNC: 4.6 MG/DL (ref 0.2–1.3)
BILIRUB SERPL-MCNC: 4.6 MG/DL (ref 0.2–1.3)
BILIRUB SERPL-MCNC: 4.7 MG/DL (ref 0.2–1.3)
BLD PROD TYP BPU: NORMAL
BUN SERPL-MCNC: 22 MG/DL (ref 7–30)
BUN SERPL-MCNC: 22 MG/DL (ref 7–30)
BUN SERPL-MCNC: 25 MG/DL (ref 7–30)
BUN SERPL-MCNC: 26 MG/DL (ref 7–30)
CA-I BLD-MCNC: 3.8 MG/DL (ref 4.4–5.2)
CA-I BLD-MCNC: 4.4 MG/DL (ref 4.4–5.2)
CA-I BLD-MCNC: 4.5 MG/DL (ref 4.4–5.2)
CA-I BLD-MCNC: 4.7 MG/DL (ref 4.4–5.2)
CALCIUM SERPL-MCNC: 7.3 MG/DL (ref 8.5–10.1)
CALCIUM SERPL-MCNC: 7.6 MG/DL (ref 8.5–10.1)
CALCIUM SERPL-MCNC: 7.6 MG/DL (ref 8.5–10.1)
CALCIUM SERPL-MCNC: 7.8 MG/DL (ref 8.5–10.1)
CHLORIDE SERPL-SCNC: 107 MMOL/L (ref 94–109)
CHLORIDE SERPL-SCNC: 109 MMOL/L (ref 94–109)
CHLORIDE SERPL-SCNC: 110 MMOL/L (ref 94–109)
CHLORIDE SERPL-SCNC: 110 MMOL/L (ref 94–109)
CO2 SERPL-SCNC: 24 MMOL/L (ref 20–32)
CO2 SERPL-SCNC: 25 MMOL/L (ref 20–32)
CO2 SERPL-SCNC: 28 MMOL/L (ref 20–32)
CO2 SERPL-SCNC: 28 MMOL/L (ref 20–32)
CREAT SERPL-MCNC: 0.79 MG/DL (ref 0.52–1.04)
CREAT SERPL-MCNC: 0.8 MG/DL (ref 0.52–1.04)
CREAT SERPL-MCNC: 0.91 MG/DL (ref 0.52–1.04)
CREAT SERPL-MCNC: 1.01 MG/DL (ref 0.52–1.04)
DIFFERENTIAL METHOD BLD: ABNORMAL
EOSINOPHIL # BLD AUTO: 0.1 10E9/L (ref 0–0.7)
EOSINOPHIL # BLD AUTO: 0.2 10E9/L (ref 0–0.7)
EOSINOPHIL NFR BLD AUTO: 1.1 %
EOSINOPHIL NFR BLD AUTO: 1.9 %
EOSINOPHIL NFR BLD AUTO: 2.3 %
EOSINOPHIL NFR BLD AUTO: 3.1 %
ERYTHROCYTE [DISTWIDTH] IN BLOOD BY AUTOMATED COUNT: 18.4 % (ref 10–15)
ERYTHROCYTE [DISTWIDTH] IN BLOOD BY AUTOMATED COUNT: 18.6 % (ref 10–15)
ERYTHROCYTE [DISTWIDTH] IN BLOOD BY AUTOMATED COUNT: 18.7 % (ref 10–15)
ERYTHROCYTE [DISTWIDTH] IN BLOOD BY AUTOMATED COUNT: 18.7 % (ref 10–15)
FIBRINOGEN PPP-MCNC: 292 MG/DL (ref 200–420)
FIBRINOGEN PPP-MCNC: 313 MG/DL (ref 200–420)
GFR SERPL CREATININE-BSD FRML MDRD: 61 ML/MIN/{1.73_M2}
GFR SERPL CREATININE-BSD FRML MDRD: 69 ML/MIN/{1.73_M2}
GFR SERPL CREATININE-BSD FRML MDRD: 81 ML/MIN/{1.73_M2}
GFR SERPL CREATININE-BSD FRML MDRD: 82 ML/MIN/{1.73_M2}
GLUCOSE BLDC GLUCOMTR-MCNC: 95 MG/DL (ref 70–99)
GLUCOSE SERPL-MCNC: 109 MG/DL (ref 70–99)
GLUCOSE SERPL-MCNC: 114 MG/DL (ref 70–99)
GLUCOSE SERPL-MCNC: 94 MG/DL (ref 70–99)
GLUCOSE SERPL-MCNC: 96 MG/DL (ref 70–99)
HCO3 BLD-SCNC: 26 MMOL/L (ref 21–28)
HCT VFR BLD AUTO: 23.7 % (ref 35–47)
HCT VFR BLD AUTO: 24.3 % (ref 35–47)
HCT VFR BLD AUTO: 24.8 % (ref 35–47)
HCT VFR BLD AUTO: 25.1 % (ref 35–47)
HGB BLD-MCNC: 7.5 G/DL (ref 11.7–15.7)
HGB BLD-MCNC: 7.7 G/DL (ref 11.7–15.7)
HGB BLD-MCNC: 7.8 G/DL (ref 11.7–15.7)
HGB BLD-MCNC: 8 G/DL (ref 11.7–15.7)
IMM GRANULOCYTES # BLD: 0.1 10E9/L (ref 0–0.4)
IMM GRANULOCYTES NFR BLD: 1.6 %
IMM GRANULOCYTES NFR BLD: 1.6 %
IMM GRANULOCYTES NFR BLD: 2 %
IMM GRANULOCYTES NFR BLD: 2.1 %
INR PPP: 1.24 (ref 0.86–1.14)
INR PPP: 1.26 (ref 0.86–1.14)
LYMPHOCYTES # BLD AUTO: 0.2 10E9/L (ref 0.8–5.3)
LYMPHOCYTES # BLD AUTO: 0.3 10E9/L (ref 0.8–5.3)
LYMPHOCYTES NFR BLD AUTO: 3.1 %
LYMPHOCYTES NFR BLD AUTO: 4.4 %
LYMPHOCYTES NFR BLD AUTO: 4.5 %
LYMPHOCYTES NFR BLD AUTO: 5 %
MAGNESIUM SERPL-MCNC: 2.4 MG/DL (ref 1.6–2.3)
MAGNESIUM SERPL-MCNC: 2.5 MG/DL (ref 1.6–2.3)
MAGNESIUM SERPL-MCNC: 2.5 MG/DL (ref 1.6–2.3)
MAGNESIUM SERPL-MCNC: 2.6 MG/DL (ref 1.6–2.3)
MCH RBC QN AUTO: 29.6 PG (ref 26.5–33)
MCH RBC QN AUTO: 30.2 PG (ref 26.5–33)
MCH RBC QN AUTO: 30.3 PG (ref 26.5–33)
MCH RBC QN AUTO: 31 PG (ref 26.5–33)
MCHC RBC AUTO-ENTMCNC: 31 G/DL (ref 31.5–36.5)
MCHC RBC AUTO-ENTMCNC: 31.6 G/DL (ref 31.5–36.5)
MCHC RBC AUTO-ENTMCNC: 31.9 G/DL (ref 31.5–36.5)
MCHC RBC AUTO-ENTMCNC: 32.1 G/DL (ref 31.5–36.5)
MCV RBC AUTO: 95 FL (ref 78–100)
MCV RBC AUTO: 95 FL (ref 78–100)
MCV RBC AUTO: 96 FL (ref 78–100)
MCV RBC AUTO: 96 FL (ref 78–100)
MONOCYTES # BLD AUTO: 0.5 10E9/L (ref 0–1.3)
MONOCYTES # BLD AUTO: 0.6 10E9/L (ref 0–1.3)
MONOCYTES # BLD AUTO: 0.7 10E9/L (ref 0–1.3)
MONOCYTES # BLD AUTO: 0.7 10E9/L (ref 0–1.3)
MONOCYTES NFR BLD AUTO: 11.7 %
MONOCYTES NFR BLD AUTO: 12 %
MONOCYTES NFR BLD AUTO: 9.6 %
MONOCYTES NFR BLD AUTO: 9.8 %
NEUTROPHILS # BLD AUTO: 4.4 10E9/L (ref 1.6–8.3)
NEUTROPHILS # BLD AUTO: 4.7 10E9/L (ref 1.6–8.3)
NEUTROPHILS # BLD AUTO: 4.9 10E9/L (ref 1.6–8.3)
NEUTROPHILS # BLD AUTO: 4.9 10E9/L (ref 1.6–8.3)
NEUTROPHILS NFR BLD AUTO: 78.6 %
NEUTROPHILS NFR BLD AUTO: 81 %
NEUTROPHILS NFR BLD AUTO: 81.9 %
NEUTROPHILS NFR BLD AUTO: 82.3 %
NRBC # BLD AUTO: 0 10*3/UL
NRBC BLD AUTO-RTO: 0 /100
NUM BPU REQUESTED: 1
O2/TOTAL GAS SETTING VFR VENT: 30 %
PCO2 BLD: 36 MM HG (ref 35–45)
PH BLD: 7.48 PH (ref 7.35–7.45)
PHOSPHATE SERPL-MCNC: 2.8 MG/DL (ref 2.5–4.5)
PHOSPHATE SERPL-MCNC: 3.1 MG/DL (ref 2.5–4.5)
PHOSPHATE SERPL-MCNC: 3.2 MG/DL (ref 2.5–4.5)
PHOSPHATE SERPL-MCNC: 3.5 MG/DL (ref 2.5–4.5)
PLATELET # BLD AUTO: 44 10E9/L (ref 150–450)
PLATELET # BLD AUTO: 47 10E9/L (ref 150–450)
PLATELET # BLD AUTO: 48 10E9/L (ref 150–450)
PLATELET # BLD AUTO: 51 10E9/L (ref 150–450)
PLATELET # BLD EST: ABNORMAL 10*3/UL
PO2 BLD: 72 MM HG (ref 80–105)
POTASSIUM SERPL-SCNC: 3.8 MMOL/L (ref 3.4–5.3)
POTASSIUM SERPL-SCNC: 4 MMOL/L (ref 3.4–5.3)
POTASSIUM SERPL-SCNC: 4 MMOL/L (ref 3.4–5.3)
POTASSIUM SERPL-SCNC: 4.1 MMOL/L (ref 3.4–5.3)
PROT SERPL-MCNC: 4.6 G/DL (ref 6.8–8.8)
PROT SERPL-MCNC: 4.7 G/DL (ref 6.8–8.8)
PROT SERPL-MCNC: 4.7 G/DL (ref 6.8–8.8)
PROT SERPL-MCNC: 6.4 G/DL (ref 6.8–8.8)
RBC # BLD AUTO: 2.48 10E12/L (ref 3.8–5.2)
RBC # BLD AUTO: 2.52 10E12/L (ref 3.8–5.2)
RBC # BLD AUTO: 2.6 10E12/L (ref 3.8–5.2)
RBC # BLD AUTO: 2.64 10E12/L (ref 3.8–5.2)
SODIUM SERPL-SCNC: 140 MMOL/L (ref 133–144)
SODIUM SERPL-SCNC: 142 MMOL/L (ref 133–144)
SPECIMEN SOURCE: NORMAL
TACROLIMUS BLD-MCNC: 7 UG/L (ref 5–15)
TME LAST DOSE: NORMAL H
WBC # BLD AUTO: 5.4 10E9/L (ref 4–11)
WBC # BLD AUTO: 5.7 10E9/L (ref 4–11)
WBC # BLD AUTO: 6.1 10E9/L (ref 4–11)
WBC # BLD AUTO: 6.2 10E9/L (ref 4–11)

## 2019-08-23 PROCEDURE — 85384 FIBRINOGEN ACTIVITY: CPT | Performed by: GENERAL ACUTE CARE HOSPITAL

## 2019-08-23 PROCEDURE — 97530 THERAPEUTIC ACTIVITIES: CPT | Mod: GO | Performed by: OCCUPATIONAL THERAPIST

## 2019-08-23 PROCEDURE — 99291 CRITICAL CARE FIRST HOUR: CPT | Mod: 25 | Performed by: NURSE PRACTITIONER

## 2019-08-23 PROCEDURE — 83735 ASSAY OF MAGNESIUM: CPT | Performed by: GENERAL ACUTE CARE HOSPITAL

## 2019-08-23 PROCEDURE — 40000275 ZZH STATISTIC RCP TIME EA 10 MIN

## 2019-08-23 PROCEDURE — P9047 ALBUMIN (HUMAN), 25%, 50ML: HCPCS | Performed by: NURSE PRACTITIONER

## 2019-08-23 PROCEDURE — 94003 VENT MGMT INPAT SUBQ DAY: CPT

## 2019-08-23 PROCEDURE — 85384 FIBRINOGEN ACTIVITY: CPT | Performed by: STUDENT IN AN ORGANIZED HEALTH CARE EDUCATION/TRAINING PROGRAM

## 2019-08-23 PROCEDURE — 40000196 ZZH STATISTIC RAPCV CVP MONITORING

## 2019-08-23 PROCEDURE — 40000986 XR CHEST PORT 1 VW

## 2019-08-23 PROCEDURE — 25000131 ZZH RX MED GY IP 250 OP 636 PS 637: Performed by: PHYSICIAN ASSISTANT

## 2019-08-23 PROCEDURE — 25000132 ZZH RX MED GY IP 250 OP 250 PS 637: Performed by: INTERNAL MEDICINE

## 2019-08-23 PROCEDURE — 80053 COMPREHEN METABOLIC PANEL: CPT | Performed by: GENERAL ACUTE CARE HOSPITAL

## 2019-08-23 PROCEDURE — 25000132 ZZH RX MED GY IP 250 OP 250 PS 637: Performed by: SURGERY

## 2019-08-23 PROCEDURE — 25800030 ZZH RX IP 258 OP 636: Performed by: STUDENT IN AN ORGANIZED HEALTH CARE EDUCATION/TRAINING PROGRAM

## 2019-08-23 PROCEDURE — 25000128 H RX IP 250 OP 636: Performed by: NURSE PRACTITIONER

## 2019-08-23 PROCEDURE — 97535 SELF CARE MNGMENT TRAINING: CPT | Mod: GO | Performed by: OCCUPATIONAL THERAPIST

## 2019-08-23 PROCEDURE — 82330 ASSAY OF CALCIUM: CPT | Performed by: STUDENT IN AN ORGANIZED HEALTH CARE EDUCATION/TRAINING PROGRAM

## 2019-08-23 PROCEDURE — 85610 PROTHROMBIN TIME: CPT | Performed by: GENERAL ACUTE CARE HOSPITAL

## 2019-08-23 PROCEDURE — 36600 WITHDRAWAL OF ARTERIAL BLOOD: CPT

## 2019-08-23 PROCEDURE — 82248 BILIRUBIN DIRECT: CPT | Performed by: STUDENT IN AN ORGANIZED HEALTH CARE EDUCATION/TRAINING PROGRAM

## 2019-08-23 PROCEDURE — 82803 BLOOD GASES ANY COMBINATION: CPT | Performed by: STUDENT IN AN ORGANIZED HEALTH CARE EDUCATION/TRAINING PROGRAM

## 2019-08-23 PROCEDURE — 80197 ASSAY OF TACROLIMUS: CPT | Performed by: PHYSICIAN ASSISTANT

## 2019-08-23 PROCEDURE — 36556 INSERT NON-TUNNEL CV CATH: CPT | Performed by: NURSE PRACTITIONER

## 2019-08-23 PROCEDURE — 25000131 ZZH RX MED GY IP 250 OP 636 PS 637: Performed by: SURGERY

## 2019-08-23 PROCEDURE — 85025 COMPLETE CBC W/AUTO DIFF WBC: CPT | Performed by: STUDENT IN AN ORGANIZED HEALTH CARE EDUCATION/TRAINING PROGRAM

## 2019-08-23 PROCEDURE — 87040 BLOOD CULTURE FOR BACTERIA: CPT | Performed by: OBSTETRICS & GYNECOLOGY

## 2019-08-23 PROCEDURE — 25000132 ZZH RX MED GY IP 250 OP 250 PS 637: Performed by: STUDENT IN AN ORGANIZED HEALTH CARE EDUCATION/TRAINING PROGRAM

## 2019-08-23 PROCEDURE — 25000132 ZZH RX MED GY IP 250 OP 250 PS 637: Performed by: NURSE PRACTITIONER

## 2019-08-23 PROCEDURE — P9037 PLATE PHERES LEUKOREDU IRRAD: HCPCS | Performed by: STUDENT IN AN ORGANIZED HEALTH CARE EDUCATION/TRAINING PROGRAM

## 2019-08-23 PROCEDURE — 85610 PROTHROMBIN TIME: CPT | Performed by: STUDENT IN AN ORGANIZED HEALTH CARE EDUCATION/TRAINING PROGRAM

## 2019-08-23 PROCEDURE — P9047 ALBUMIN (HUMAN), 25%, 50ML: HCPCS | Performed by: PHYSICIAN ASSISTANT

## 2019-08-23 PROCEDURE — 25000128 H RX IP 250 OP 636: Performed by: PHYSICIAN ASSISTANT

## 2019-08-23 PROCEDURE — 84100 ASSAY OF PHOSPHORUS: CPT | Performed by: STUDENT IN AN ORGANIZED HEALTH CARE EDUCATION/TRAINING PROGRAM

## 2019-08-23 PROCEDURE — 36415 COLL VENOUS BLD VENIPUNCTURE: CPT | Performed by: OBSTETRICS & GYNECOLOGY

## 2019-08-23 PROCEDURE — 90947 DIALYSIS REPEATED EVAL: CPT

## 2019-08-23 PROCEDURE — 27210429 ZZH NUTRITION PRODUCT INTERMEDIATE LITER

## 2019-08-23 PROCEDURE — 84100 ASSAY OF PHOSPHORUS: CPT | Performed by: GENERAL ACUTE CARE HOSPITAL

## 2019-08-23 PROCEDURE — 85025 COMPLETE CBC W/AUTO DIFF WBC: CPT | Performed by: GENERAL ACUTE CARE HOSPITAL

## 2019-08-23 PROCEDURE — 00000146 ZZHCL STATISTIC GLUCOSE BY METER IP

## 2019-08-23 PROCEDURE — 25000125 ZZHC RX 250: Performed by: INTERNAL MEDICINE

## 2019-08-23 PROCEDURE — 20000004 ZZH R&B ICU UMMC

## 2019-08-23 PROCEDURE — 80053 COMPREHEN METABOLIC PANEL: CPT | Performed by: STUDENT IN AN ORGANIZED HEALTH CARE EDUCATION/TRAINING PROGRAM

## 2019-08-23 PROCEDURE — 83735 ASSAY OF MAGNESIUM: CPT | Performed by: STUDENT IN AN ORGANIZED HEALTH CARE EDUCATION/TRAINING PROGRAM

## 2019-08-23 PROCEDURE — 25000125 ZZHC RX 250: Performed by: STUDENT IN AN ORGANIZED HEALTH CARE EDUCATION/TRAINING PROGRAM

## 2019-08-23 PROCEDURE — 71045 X-RAY EXAM CHEST 1 VIEW: CPT

## 2019-08-23 PROCEDURE — 25800030 ZZH RX IP 258 OP 636: Performed by: PHYSICIAN ASSISTANT

## 2019-08-23 RX ORDER — FAMOTIDINE 20 MG/1
20 TABLET, FILM COATED ORAL DAILY
Status: DISCONTINUED | OUTPATIENT
Start: 2019-08-24 | End: 2019-08-23

## 2019-08-23 RX ORDER — VALGANCICLOVIR HYDROCHLORIDE 50 MG/ML
450 POWDER, FOR SOLUTION ORAL EVERY OTHER DAY
Status: DISCONTINUED | OUTPATIENT
Start: 2019-08-23 | End: 2019-08-24

## 2019-08-23 RX ORDER — VALGANCICLOVIR HYDROCHLORIDE 50 MG/ML
450 POWDER, FOR SOLUTION ORAL
Status: DISCONTINUED | OUTPATIENT
Start: 2019-08-23 | End: 2019-08-23

## 2019-08-23 RX ORDER — VALGANCICLOVIR 450 MG/1
450 TABLET, FILM COATED ORAL EVERY OTHER DAY
Status: DISCONTINUED | OUTPATIENT
Start: 2019-08-23 | End: 2019-08-23 | Stop reason: CLARIF

## 2019-08-23 RX ORDER — PREDNISONE 5 MG/ML
5 SOLUTION ORAL DAILY
Status: DISCONTINUED | OUTPATIENT
Start: 2019-08-24 | End: 2019-08-28

## 2019-08-23 RX ORDER — OXYMETAZOLINE HYDROCHLORIDE 0.05 G/100ML
2 SPRAY NASAL 2 TIMES DAILY PRN
Status: DISCONTINUED | OUTPATIENT
Start: 2019-08-23 | End: 2019-09-23 | Stop reason: HOSPADM

## 2019-08-23 RX ORDER — VALGANCICLOVIR HYDROCHLORIDE 50 MG/ML
450 POWDER, FOR SOLUTION ORAL EVERY OTHER DAY
Status: DISCONTINUED | OUTPATIENT
Start: 2019-08-23 | End: 2019-08-23 | Stop reason: CLARIF

## 2019-08-23 RX ORDER — BISACODYL 10 MG
10 SUPPOSITORY, RECTAL RECTAL DAILY
Status: DISCONTINUED | OUTPATIENT
Start: 2019-08-23 | End: 2019-08-23

## 2019-08-23 RX ORDER — FAMOTIDINE 20 MG/1
20 TABLET, FILM COATED ORAL 2 TIMES DAILY
Status: DISCONTINUED | OUTPATIENT
Start: 2019-08-23 | End: 2019-08-24

## 2019-08-23 RX ORDER — VALGANCICLOVIR 450 MG/1
450 TABLET, FILM COATED ORAL
Status: DISCONTINUED | OUTPATIENT
Start: 2019-08-23 | End: 2019-08-23

## 2019-08-23 RX ORDER — ALBUMIN (HUMAN) 12.5 G/50ML
12.5 SOLUTION INTRAVENOUS EVERY 6 HOURS
Status: COMPLETED | OUTPATIENT
Start: 2019-08-23 | End: 2019-08-24

## 2019-08-23 RX ORDER — VALGANCICLOVIR 450 MG/1
450 TABLET, FILM COATED ORAL EVERY OTHER DAY
Status: DISCONTINUED | OUTPATIENT
Start: 2019-08-23 | End: 2019-08-24

## 2019-08-23 RX ORDER — AMOXICILLIN 250 MG
2 CAPSULE ORAL 2 TIMES DAILY
Status: DISCONTINUED | OUTPATIENT
Start: 2019-08-23 | End: 2019-09-05

## 2019-08-23 RX ORDER — DEXMEDETOMIDINE HYDROCHLORIDE 4 UG/ML
0.2-0.7 INJECTION, SOLUTION INTRAVENOUS CONTINUOUS
Status: DISCONTINUED | OUTPATIENT
Start: 2019-08-23 | End: 2019-08-23

## 2019-08-23 RX ADMIN — Medication 2 DROP: at 14:00

## 2019-08-23 RX ADMIN — Medication 3 MG: at 07:44

## 2019-08-23 RX ADMIN — Medication 3 MG: at 17:51

## 2019-08-23 RX ADMIN — CALCIUM CHLORIDE, MAGNESIUM CHLORIDE, SODIUM CHLORIDE, SODIUM BICARBONATE, POTASSIUM CHLORIDE AND SODIUM PHOSPHATE DIBASIC DIHYDRATE 12.5 ML/KG/HR: 3.68; 3.05; 6.34; 3.09; .314; .187 INJECTION INTRAVENOUS at 18:40

## 2019-08-23 RX ADMIN — CALCIUM CHLORIDE, MAGNESIUM CHLORIDE, SODIUM CHLORIDE, SODIUM BICARBONATE, POTASSIUM CHLORIDE AND SODIUM PHOSPHATE DIBASIC DIHYDRATE 12.5 ML/KG/HR: 3.68; 3.05; 6.34; 3.09; .314; .187 INJECTION INTRAVENOUS at 00:38

## 2019-08-23 RX ADMIN — OXYCODONE HYDROCHLORIDE 5 MG: 5 TABLET ORAL at 21:27

## 2019-08-23 RX ADMIN — CALCIUM CHLORIDE, MAGNESIUM CHLORIDE, SODIUM CHLORIDE, SODIUM BICARBONATE, POTASSIUM CHLORIDE AND SODIUM PHOSPHATE DIBASIC DIHYDRATE 12.5 ML/KG/HR: 3.68; 3.05; 6.34; 3.09; .314; .187 INJECTION INTRAVENOUS at 22:27

## 2019-08-23 RX ADMIN — Medication 2 DROP: at 06:40

## 2019-08-23 RX ADMIN — VALGANCICLOVIR HYDROCHLORIDE 450 MG: 50 POWDER, FOR SOLUTION ORAL at 09:08

## 2019-08-23 RX ADMIN — MYCOPHENOLATE MOFETIL 750 MG: 200 POWDER, FOR SUSPENSION ORAL at 07:44

## 2019-08-23 RX ADMIN — SALINE NASAL SPRAY 1 SPRAY: 1.5 SOLUTION NASAL at 15:58

## 2019-08-23 RX ADMIN — Medication 1 PACKET: at 07:43

## 2019-08-23 RX ADMIN — SALINE NASAL SPRAY 1 SPRAY: 1.5 SOLUTION NASAL at 19:57

## 2019-08-23 RX ADMIN — LEVOTHYROXINE SODIUM 125 MCG: 0.12 TABLET ORAL at 07:45

## 2019-08-23 RX ADMIN — CALCIUM CHLORIDE, MAGNESIUM CHLORIDE, SODIUM CHLORIDE, SODIUM BICARBONATE, POTASSIUM CHLORIDE AND SODIUM PHOSPHATE DIBASIC DIHYDRATE 12.5 ML/KG/HR: 3.68; 3.05; 6.34; 3.09; .314; .187 INJECTION INTRAVENOUS at 11:30

## 2019-08-23 RX ADMIN — CALCIUM CHLORIDE, MAGNESIUM CHLORIDE, SODIUM CHLORIDE, SODIUM BICARBONATE, POTASSIUM CHLORIDE AND SODIUM PHOSPHATE DIBASIC DIHYDRATE 1.89 ML/KG/HR: 3.68; 3.05; 6.34; 3.09; .314; .187 INJECTION INTRAVENOUS at 22:22

## 2019-08-23 RX ADMIN — CALCIUM CHLORIDE, MAGNESIUM CHLORIDE, SODIUM CHLORIDE, SODIUM BICARBONATE, POTASSIUM CHLORIDE AND SODIUM PHOSPHATE DIBASIC DIHYDRATE 12.5 ML/KG/HR: 3.68; 3.05; 6.34; 3.09; .314; .187 INJECTION INTRAVENOUS at 22:26

## 2019-08-23 RX ADMIN — MICAFUNGIN SODIUM 100 MG: 10 INJECTION, POWDER, LYOPHILIZED, FOR SOLUTION INTRAVENOUS at 13:12

## 2019-08-23 RX ADMIN — ALBUMIN HUMAN 12.5 G: 0.25 SOLUTION INTRAVENOUS at 13:13

## 2019-08-23 RX ADMIN — ALBUMIN HUMAN 25 G: 0.25 SOLUTION INTRAVENOUS at 04:53

## 2019-08-23 RX ADMIN — ALBUMIN HUMAN 12.5 G: 0.25 SOLUTION INTRAVENOUS at 17:51

## 2019-08-23 RX ADMIN — MELATONIN 1000 UNITS: at 07:45

## 2019-08-23 RX ADMIN — HYDROMORPHONE HYDROCHLORIDE 0.5 MG: 1 INJECTION, SOLUTION INTRAMUSCULAR; INTRAVENOUS; SUBCUTANEOUS at 23:02

## 2019-08-23 RX ADMIN — Medication 2 DROP: at 19:57

## 2019-08-23 RX ADMIN — MELATONIN 1000 UNITS: at 19:53

## 2019-08-23 RX ADMIN — HYDROMORPHONE HYDROCHLORIDE 0.5 MG: 1 INJECTION, SOLUTION INTRAMUSCULAR; INTRAVENOUS; SUBCUTANEOUS at 01:26

## 2019-08-23 RX ADMIN — FAMOTIDINE 20 MG: 20 TABLET, FILM COATED ORAL at 19:53

## 2019-08-23 RX ADMIN — OXYCODONE HYDROCHLORIDE 5 MG: 5 TABLET ORAL at 16:40

## 2019-08-23 RX ADMIN — MYCOPHENOLATE MOFETIL 750 MG: 200 POWDER, FOR SUSPENSION ORAL at 17:51

## 2019-08-23 RX ADMIN — FAMOTIDINE 20 MG: 20 TABLET ORAL at 07:45

## 2019-08-23 RX ADMIN — MULTIVITAMIN 15 ML: LIQUID ORAL at 07:45

## 2019-08-23 RX ADMIN — CALCIUM CHLORIDE, MAGNESIUM CHLORIDE, SODIUM CHLORIDE, SODIUM BICARBONATE, POTASSIUM CHLORIDE AND SODIUM PHOSPHATE DIBASIC DIHYDRATE 12.5 ML/KG/HR: 3.68; 3.05; 6.34; 3.09; .314; .187 INJECTION INTRAVENOUS at 00:37

## 2019-08-23 RX ADMIN — PREDNISONE 10 MG: 10 TABLET ORAL at 07:45

## 2019-08-23 RX ADMIN — HYDROMORPHONE HYDROCHLORIDE 0.5 MG: 1 INJECTION, SOLUTION INTRAMUSCULAR; INTRAVENOUS; SUBCUTANEOUS at 19:53

## 2019-08-23 RX ADMIN — ASPIRIN 325 MG ORAL TABLET 325 MG: 325 PILL ORAL at 07:45

## 2019-08-23 RX ADMIN — SALINE NASAL SPRAY 1 SPRAY: 1.5 SOLUTION NASAL at 13:10

## 2019-08-23 RX ADMIN — SALINE NASAL SPRAY 1 SPRAY: 1.5 SOLUTION NASAL at 07:44

## 2019-08-23 RX ADMIN — HYDROMORPHONE HYDROCHLORIDE 0.5 MG: 1 INJECTION, SOLUTION INTRAMUSCULAR; INTRAVENOUS; SUBCUTANEOUS at 16:40

## 2019-08-23 RX ADMIN — HYDROMORPHONE HYDROCHLORIDE 0.5 MG: 1 INJECTION, SOLUTION INTRAMUSCULAR; INTRAVENOUS; SUBCUTANEOUS at 02:55

## 2019-08-23 RX ADMIN — Medication 1 PACKET: at 15:56

## 2019-08-23 RX ADMIN — OXYCODONE HYDROCHLORIDE 5 MG: 5 TABLET ORAL at 01:26

## 2019-08-23 RX ADMIN — Medication 1 PACKET: at 19:57

## 2019-08-23 RX ADMIN — DEXMEDETOMIDINE 0.2 MCG/KG/HR: 100 INJECTION, SOLUTION, CONCENTRATE INTRAVENOUS at 01:37

## 2019-08-23 RX ADMIN — CALCIUM CHLORIDE, MAGNESIUM CHLORIDE, SODIUM CHLORIDE, SODIUM BICARBONATE, POTASSIUM CHLORIDE AND SODIUM PHOSPHATE DIBASIC DIHYDRATE 12.5 ML/KG/HR: 3.68; 3.05; 6.34; 3.09; .314; .187 INJECTION INTRAVENOUS at 11:29

## 2019-08-23 ASSESSMENT — ACTIVITIES OF DAILY LIVING (ADL)
ADLS_ACUITY_SCORE: 26
ADLS_ACUITY_SCORE: 26
ADLS_ACUITY_SCORE: 24
ADLS_ACUITY_SCORE: 26

## 2019-08-23 ASSESSMENT — PAIN DESCRIPTION - DESCRIPTORS: DESCRIPTORS: PATIENT UNABLE TO DESCRIBE

## 2019-08-23 NOTE — PROGRESS NOTES
CRRT STATUS NOTE    DATA:  Time:  4:45 PM  Pressures WNL:  YES  Filter Status:  WDL    Problems Reported/Alarms Noted:  Lines reversed d/t flow alarms    Supplies Present:  YES    ASSESSMENT:  Patient Net Fluid Balance:  - 1759 ml since midnight, achieving goal  Vital Signs:  T 99 F Axillary, HR 85, RR 22, /63 (94) cuff, Sat 100% vented on 40% FiO2  Labs:  Results for EARLENE GONZALES (MRN [id number]) as of 8/23/2019 18:41   Ref. Range 8/23/2019 15:56   Sodium Latest Ref Range: 133 - 144 mmol/L 140   Potassium Latest Ref Range: 3.4 - 5.3 mmol/L 3.8   Chloride Latest Ref Range: 94 - 109 mmol/L 107   Carbon Dioxide Latest Ref Range: 20 - 32 mmol/L 24   Urea Nitrogen Latest Ref Range: 7 - 30 mg/dL 22   Creatinine Latest Ref Range: 0.52 - 1.04 mg/dL 0.79   GFR Estimate Latest Ref Range: >60 mL/min/1.73_m2 82   GFR Estimate If Black Latest Ref Range: >60 mL/min/1.73_m2 >90   Calcium Latest Ref Range: 8.5 - 10.1 mg/dL 7.3 (L)   Anion Gap Latest Ref Range: 3 - 14 mmol/L 9   Magnesium Latest Ref Range: 1.6 - 2.3 mg/dL 2.4 (H)   Phosphorus Latest Ref Range: 2.5 - 4.5 mg/dL 2.8   Albumin Latest Ref Range: 3.4 - 5.0 g/dL 4.2   Protein Total Latest Ref Range: 6.8 - 8.8 g/dL 6.4 (L)   Bilirubin Total Latest Ref Range: 0.2 - 1.3 mg/dL 4.6 (H)   Alkaline Phosphatase Latest Ref Range: 40 - 150 U/L 103   ALT Latest Ref Range: 0 - 50 U/L 204 (H)   AST Latest Ref Range: 0 - 45 U/L 76 (H)   Glucose Latest Ref Range: 70 - 99 mg/dL 109 (H)   WBC Latest Ref Range: 4.0 - 11.0 10e9/L 5.7   Hemoglobin Latest Ref Range: 11.7 - 15.7 g/dL 7.5 (L)   Hematocrit Latest Ref Range: 35.0 - 47.0 % 23.7 (L)   Platelet Count Latest Ref Range: 150 - 450 10e9/L 51 (L)   RBC Count Latest Ref Range: 3.8 - 5.2 10e12/L 2.48 (L)   MCV Latest Ref Range: 78 - 100 fl 96   MCH Latest Ref Range: 26.5 - 33.0 pg 30.2   MCHC Latest Ref Range: 31.5 - 36.5 g/dL 31.6   RDW Latest Ref Range: 10.0 - 15.0 % 18.7 (H)   Diff Method Unknown Automated Method   %  Neutrophils Latest Units: % 82.3   % Lymphocytes Latest Units: % 4.4   % Monocytes Latest Units: % 9.6   % Eosinophils Latest Units: % 1.9   % Basophils Latest Units: % 0.2   % Immature Granulocytes Latest Units: % 1.6   Nucleated RBCs Latest Ref Range: 0 /100 0   Absolute Neutrophil Latest Ref Range: 1.6 - 8.3 10e9/L 4.7   Absolute Lymphocytes Latest Ref Range: 0.8 - 5.3 10e9/L 0.3 (L)   Absolute Monocytes Latest Ref Range: 0.0 - 1.3 10e9/L 0.6   Absolute Eosinophils Latest Ref Range: 0.0 - 0.7 10e9/L 0.1   Absolute Basophils Latest Ref Range: 0.0 - 0.2 10e9/L 0.0   Abs Immature Granulocytes Latest Ref Range: 0 - 0.4 10e9/L 0.1   Absolute Nucleated RBC Unknown 0.0     Goals of Therapy:  0-100 ml/hr net negative as tolerated - suggest not exceeding 2-2.4 L negative per 24 hours and to take into account fluid losses from drains etc.    INTERVENTIONS:   Lines reversed d/t flow alarms    PLAN:  Continue CRRT as per MD order. Contact CRRT Resource RN with any questions, comments or concerns.

## 2019-08-23 NOTE — PROGRESS NOTES
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        CHANGES:  - transfused 1 platelet overnight  - Rewire MAC line  - Discontinue sliding scale insulin  - discontinue Precedex  - Repeat PST this afternoon with goal of extubation  - blood culture and CXR obtained for workup of elevated temp per transplant  - Decrease platelet transfusion goal to <30K  - Decrease Vt 450     Neurological  # Acute postoperative pain  # hx of hepatic encephalopathy  - Monitor neurological status. Delirium preventions and precautions.   - Pain: prn dilaudid, prn oxycodone, no acetaminophen   - Sedation: Precedex- wean to off today       Pulmonary  # Acute respiratory failure  - VENT: SIMV 15/500-->450/5/PS7/40%   - PST today- marginal. Will repeat this afternoon.    Ventilatory bundle. HOB elevation    Cardiovascular  # Shock - hypovolemic/hemorrhagic resolved  - Monitor hemodynamic status  - MAPs >65. Off pressors  - Dobutamine stress test 6/19/2019 negative for ischemia     # hx of HTN  - PTA spironolactone 100 mg BID, bumetanide 1 mg   - Hold for now.      Gastroenterology/Nutrition  # ESLD 2/2 Alpha-1-antitrypsin and ANGULO s/p DDLT 8/18/19    # heterozygous (MZ genotype) for alpha-1-antitrypsin with secondary iron overload  # hyperbilirubinemia  # umbilical hernia  - MELD at transplant: 39  - most recent EGD 5/7/2019 without esophageal or gastric varices  - bowel regimen: Miralax, Senna  - JPs x 2 with high output (>3 L). Will give albumin 25% 12.5 grams every 6 hours x 4.       # Protein calorie deficit malnutrition   - No indication for parenteral nutrition.  - Tube feeds at 40 ml/hr via gastric feeding tube.      - PTA famotidine 20 mg BID       Renal/Fluid/Electrolytes  # acute kidney injury, baseline Cr ~0.6  # history of hepatorenal  syndrome  - Monitor intake and output.  - CRRT continue with goal of -50 to 100/hr. When circuit clots off, will transition to HD.      Endocrine  # Stress and steroid hyperglycemia- resolved  - Goal to keep BG< 180 for optimal wound healing   - discontinue insulin. Continue daily glucose checks.     # hx of hypothyroidism  - PTA levothyroxine 125 mcg daily     ID  # Immunosuppression  - Continue micafungin x 7 days.   - Continue ppx: valcyte  - continue immunosuppression: methylprednisolone, mycophenolate, tacrolimus (initiated 8/19/2019)  - T max 99.9. Transplant wishes to workup-- BC obtained. CXR without infiltrates.      # Hx of SBP  - Rifaximin discontinued (8/19/2019)     Heme  # Acute blood loss anemia, EBL 5L  # thrombocytopenia  # coagulopathy  - Transfuse if hgb <7.0 or signs/symptoms of hypoperfusion  - Goal INR <2, Fibrinogen >200, plt >30K  - type and screen q72h  - hold DDAVP  - Aspirin 325mg     Musculoskeletal  # weakness and deconditioning of critical illness   # osteoarthritis  - Physical and occupational therapy consult when extubated     General Cares/Prophylaxis  DVT Prophylaxis: Pneumatic Compression Devices  GI Prophylaxis: pepcid BID   Restraints: No   Lines/ tubes/ drains:  - ETT  - L abd JPx1  - R abd OBED x1  - R internal jugular CVC  - L internal jugular dialysis catheter  - PIVx2  Code: Full  Disposition: Surgical ICU     Patient seen, findings and plan discussed with surgical ICU staff, Dr. Allen.    Lydia Oleary  CC time 40 minutes exclusive of procedures      ====================================    TODAY'S PROGRESS:   SUBJECTIVE:   Agitated overnight. Started on precedex, had asymptomatic bradycardia on higher doses. Resolved with down titration of infusion. Opens eyes. Denies dyspnea, chest pain. Endorses incisional pain.       OBJECTIVE:   1. VITAL SIGNS:   Temp:  [97.1  F (36.2  C)-99.9  F (37.7  C)] 97.2  F (36.2  C)  Pulse:  [71] 71  Heart Rate:  [53-96] 78  Resp:  [8-30]  30  BP: ()/(57-89) 98/57  MAP:  [74 mmHg-86 mmHg] 77 mmHg  Arterial Line BP: (118-128)/(52-64) 125/59  FiO2 (%):  [30 %-40 %] 40 %  SpO2:  [95 %-100 %] 98 %  Ventilation Mode: SIMV/PS  (Synchronized Intermittent Mandatory Ventilation with Pressure Support)  FiO2 (%): 40 %  Rate Set (breaths/minute): 15 breaths/min  Tidal Volume Set (mL): 500 mL  PEEP (cm H2O): 5 cmH2O  Pressure Support (cm H2O): 7 cmH2O  Oxygen Concentration (%): 30 %  Peak Inspiratory Pressure (cm H2O) (Drager Kianna): 20  Resp: 30      2. INTAKE/ OUTPUT:   I/O last 3 completed shifts:  In: 2504.33 [I.V.:418.33; Other:81; NG/GT:445]  Out: 4041 [Drains:3275; Other:766]    3. PHYSICAL EXAMINATION:   General: follows simple commands, NAD  Neuro: awake, follows commands with all extremities. Non focal.   Resp: intubated. Clear lung sounds bilaterally.   CV: RRR  Abdomen: Soft, Non-distended, tender along chevron incision- well approximated without drainage. JPx2 with light serosanguinous output.   : no durbin in place  Extremities: anasarca. Warm.     4. INVESTIGATIONS:   Arterial Blood Gases   Recent Labs   Lab 08/23/19  0439 08/22/19  1549 08/22/19  1309 08/22/19  1035   PH 7.48* 7.40 7.31* 7.33*   PCO2 36 41 53* 48*   PO2 72* 111* 84 83   HCO3 26 26 27 25     Complete Blood Count   Recent Labs   Lab 08/23/19  0440 08/22/19  2226 08/22/19  1549 08/22/19  1032   WBC 5.4 6.6 8.1 8.2   HGB 8.0* 8.3* 9.3* 9.2*   PLT 47* 57* 47* 52*     Basic Metabolic Panel  Recent Labs   Lab 08/23/19  0440 08/22/19  2226 08/22/19  1549 08/22/19  1032    142 141 142   POTASSIUM 4.0 4.5 4.5 4.2   CHLORIDE 110* 110* 109 110*   CO2 28 26 25 28   BUN 26 29 26 25   CR 1.01 1.01 1.01 1.07*   GLC 96 105* 129* 93     Liver Function Tests  Recent Labs   Lab 08/23/19  0440 08/22/19  2226 08/22/19  1549 08/22/19  1032   * 156* 194* 238*   * 320* 387* 408*   ALKPHOS 116 119 134 126   BILITOTAL 4.7* 4.4* 5.4* 5.6*   ALBUMIN 2.4* 2.2* 2.0* 2.1*   INR 1.26*  1.28* 1.29* 1.28*     Pancreatic Enzymes  Recent Labs   Lab 08/19/19  0330 08/17/19  2341 08/16/19  1119   LIPASE 2,509*  --  364   AMYLASE 326* 45  --      Coagulation Profile  Recent Labs   Lab 08/23/19  0440 08/22/19  2226 08/22/19  1549 08/22/19  1032  08/18/19  2100  08/18/19  1758  08/18/19  1605  08/17/19  2341   INR 1.26* 1.28* 1.29* 1.28*   < > 1.88*   < > 2.79*   < > 3.38*   < > 3.64*   PTT  --   --   --   --   --  42*  --  103*  --  181*  --  66*    < > = values in this interval not displayed.         5. RADIOLOGY:   Recent Results (from the past 24 hour(s))   XR Abdomen Port 1 View    Narrative    EXAM: XR ABDOMEN PORT 1 VW  8/22/2019 2:26 PM     HISTORY:  eval NG tube placement       COMPARISON:  Abdominal radiograph on 8/18/2019    FINDINGS:   Single AP view of the abdomen and the supine position. Enteric tube  and sidehole projecting over the upper stomach. Feeding tube tip  projecting over the distal stomach. Left upper quadrant surgical  drain.. Nonobstructive bowel gas pattern with air distended loops of  large bowel. No portal venous gas or pneumatosis. The included osseous  structures and soft tissues are within normal limits. Biliary stent  has migrated, now projects over the right lower quadrant/right  hemipelvis      Impression    IMPRESSION:   1. Gastric tube tip and sidehole project over the stomach.  2. Tip of the feeding tube projects over the distal stomach.. This  could be repositioned.    I have personally reviewed the examination and initial interpretation  and I agree with the findings.    LYLA GARCIA MD

## 2019-08-23 NOTE — PROGRESS NOTES
Nephrology  Progress note- continuous dialysis visit  08/23/2019     Nicci Pemberton was seen once on CRRT for volume management and dialysis prescription. Nicci Pemberton's blood pressure has been stable and she is tolerating CRRT.    Laboratory results and nurses' notes were reviewed.   No changes to management of volume, acidosis, or electrolytes.   Diagnosis - GAMAL  hypervolemia    Shari Goetz MD  [facility]  [facility]  Department of Medicine  Division of Renal Disease and Hypertension  [phone]

## 2019-08-23 NOTE — PLAN OF CARE
Problem: Renal Function Impairment (Acute Kidney Injury/Impairment)  Goal: Effective Renal Function  8/23/2019 1721 by Doug Cope, RN  Outcome: No Change  8/23/2019 0603 by Sumi Muro, RN  Outcome: No Change   D. Cont on Roseann- per nephrologist when circuit clots will not restart ( plan to transition to Hemodialysis). Able to pull 50cc/hr - lungs clear - labs wnl- bp wnl  - temp 99 . Lungs clear- up in chair x 3 hours.  A improved status - on roseann - \I cont to monitor.

## 2019-08-23 NOTE — PLAN OF CARE
Neuro - patient opens eyes to voice and can follow simple commands. Patient is quite edematous in all extremities. She can nod to pain. She does wake up and appears anxious but is easily reassured.     Cardiac - NSR 75-80s. A- line discontinued per verbal order of attending. Curr pressure 110/60s    Pulm - pressure supported 10/5 for 3 hours and then 7/5 for 2.5 hours. ABG was 7.31 and it was decided to start her on SIMV/VC. Minimal secretions.    GI - no BM - soft. Left OBED drain with greater output than right. Serosang output.      - bladder scan for 180. CRRT -1900 (at 2000). See previous note regarding clot in line

## 2019-08-23 NOTE — PLAN OF CARE
Pt remains intermittently agitated, responds well to Precedex and PRN analgesics. Does not follow commands, however moves all extremities and localizes pain. PERRL. Afebrile. Vented on SIMV-PS, 15/5/500/40%, PS7. Coarse lung sounds, minimal suctioning requirements. Bloody oral secretions observed. Sinus vitaly-SR, few PVC's noted. Normotensive this shift. TF at goal via OG. BM x3 this shift. Anuric on CRRT. Goal net -100 shift, however pull remains at 0/hr s/t copious output via OBED drains. Last CVP 8. No new skin concerns. Pt remains largely edematous and weeping. Platelets transfusing x2 for Plt47, goal >50 per SICU.     Continue to monitor, notify team of any acute changes in status.

## 2019-08-23 NOTE — PLAN OF CARE
"Discharge Planner OT    4A  Patient plan for discharge: none stated, pt intubated no family present  Current status: Pt quite anxious, facilitated up to chair for improved posture and conservative edema management.  Pt attempting to speak, tried letter board and handwriting although continued to struggle for communication, only writing \"tube\".       VC-Simv  PEEP 5  40% fio2   110/90  HR 88  98% O2  Barriers to return to prior living situation: O2 needs, complex medical, edema, weakness, anxiety  Recommendations for discharge: TCU  Rationale for recommendations: Pt was at TCU prior to admit, now with new precautions and additional deconditioning.         Entered by: Virgie Wooten 08/23/2019 2:58 PM       "

## 2019-08-23 NOTE — PROGRESS NOTES
CLINICAL NUTRITION SERVICES - REASSESSMENT NOTE     Nutrition Prescription    RECOMMENDATIONS FOR MDs/PROVIDERS TO ORDER:  No indication for TPN. If plan to advance oral diet, could continue to feed via NGT.     Malnutrition Status:    Severe in the context of chronic illness    Recommendations already ordered by Registered Dietitian (RD):  None at this time    Future/Additional Recommendations:  ---Once diet advances beyond clear liquids, May Order calorie counts.  Start oral supplements, such as Boost Glucose Control with meals when on at least on a full liquid diet.     - Calorie counts when diet > full liquids     EVALUATION OF THE PROGRESS TOWARD GOALS   Diet: NPO  Access: NGT ( [employer]) - unable to bridle at this time  Nutrition Support: Nutren 1.5 @ 45 mL/hr to provide 1620 kcals (25 kcal/kg/day), 73 gm PRO (1.1 gm/kg/day), 821 mL H2O, 190 gm CHO and no Fiber daily.  ---TF + Prosource: 1740 kcal ( 27 kcal /kg) and 106 gm protein /day(1.7 gm/kg).     Intake: Average TF intakes over the past 4 days: 415 ml, 623 kcals, 28 g pro - < 50% of estimated needs       NEW FINDINGS   Weight: severe edema noted  GI: BM+ noted x 3 last evening  Skin: jaundice  ENT: packing of nares and oral cavity   Renal: CRRT     MALNUTRITION  % Intake: </= 50% for >/= 5 days (severe)  % Weight Loss: None noted  Subcutaneous Fat Loss: Facial region:  Mild-moderate   Muscle Loss: Temporal, Scapular bone, Thoracic region (clavicle, acromium bone, deltoid, trapezius, pectoral), Upper arm (bicep, tricep), Lower arm  (forearm):  Moderate-severe  Fluid Accumulation/Edema: Severe  Malnutrition Diagnosis: Severe malnutrition in the context of chronic illness    Previous Goals   Diet adv v nutrition support within 2-3 days.  Evaluation: Not met    Previous Nutrition Diagnosis  Inadequate oral intake related to pre-transplant surgery course, and likely NPO/CL status post-operatively liver transplant as evidenced by NPO status x 1 day since last  night with plan for liver transplant surgery today   Evaluation: No change    CURRENT NUTRITION DIAGNOSIS  Inadequate protein-energy intake related to slow advancement of TF and late intitiation of TF  as evidenced by pt receiving < 50% of estimated needs over the past 4 days since TF started and NPO x 7 days.     INTERVENTIONS  Implementation  Collaboration with other providers- SICU rounds    Goals  Total avg nutritional intake to meet a minimum of 25 kcal/kg and 1.5 g PRO/kg daily (per dosing wt 64 kg).    Monitoring/Evaluation  Progress toward goals will be monitored and evaluated per protocol.        Peggy Calderon, RD, MS, LD  SICU: [phone] *[phone]

## 2019-08-23 NOTE — PROGRESS NOTES
Immunosuppression Note:    Nicci Pemberton is a 59 year old female who is seen today  for immunosuppression management     I, Mandeep Alford MD, I have examined the patient with the resident/PA/Fellow, discussed and agree with the note and findings.  I have reviewed today's vital signs, medications, labs and imaging. I reviewed the immunosuppression medications and levels. I spoke to the patient/family and explained below clinical details and answered all the questions      Transplant Surgery  Inpatient Daily Progress Note  08/23/2019    Assessment & Plan: Nicci Pemberton is a 60 yo female with a past medical history significant for end stage liver disease 2/2 ANGULO and alpha 1 anti-trypsin deficiency now s/p DD OLT on 8/18/19    Graft function: Good, AST and ALT now trending down. Lactate normalized. Repeat US showed good flow.   Will replace drain output/hypoalbuminemia with albumin x 4 doses for now.  Immunosuppression management:  mg BID, Tac dose currently 3 mg BID,  level pending for today. Solu-medrol taper completes today per protocol. Complexity of management:Medium. Contributing factors: anemia and induction  Hematology: Acute blood loss anemia. Transfusion goal hgb > 7 at this time. Hgb stable at this time. Last transfused 3 units 8/20 overnight.  Ongoing nasal bleeding, examination revealed no direct lesion, overall friable mucosa. Re-packed 8/21, ENT plans to leave packing in for 5 days. Re-bleeding with feeding tube placement 8/22, ENT re-packed around.  Cardiorespiratory: Continue pressure support trials and assess for extubation as able.  Pressors off. Hx of hypertension on spironolactone and bumex at home, will hold off at this time.  GI/Nutrition: NJ placement attempted 8/22, is currently distal gastric placement, unable to be advanced further during placement due to kinking and nasal bleeding. Continue tube feeds at goal at this time, may reassess for advancement when patient is able to be  extubated.  Endocrine: Steroid induced hyperglycemia, resolving, on sliding scale insulin.  Fluid/Electrolytes: MIVF: GAMAL- will continue CRRT at this time, plan to remove some fluid today as able. Planning to transition to IHD as able per nephrology.  : Forrester catheter removed, continue to bladder scan.  Infectious disease: Ppx with micafungin x 10 days, zosyn completed.  Prophylaxis: DVT, fall, GI, fungal  Disposition: 4A, appreciate critical cares per SICU team    Medical Decision Making: Medium  Subsequent visit 19799 (moderate level decision making)    VEENA/Fellow/Resident Provider: Mary Braxton PA-C     Faculty: Mandeep Alford M.D.    __________________________________________________________________  Transplant History: Admitted 8/16/2019 for acute decompensated ANGULO.   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    8/18/2019 (Liver), Postoperative day: 5     Interval History: Unable to obtain a history from the patient due to critical condition, intubation   Overnight events: POD 5 after liver transplant, pressure support trials. Pt responsive to commands. Intermittently agitated.    ROS:   A 10-point review of systems was negative except as noted above.    Curent Meds:    sodium chloride 0.9%  250 mL Intravenous Once in dialysis     sodium chloride 0.9%  300 mL Hemodialysis Machine Once     albumin human  25 g Intravenous Q6H     aspirin  325 mg Oral Daily     carboxymethylcellulose PF  2 drop Both Eyes Q6H     famotidine  20 mg Oral BID     gelatin absorbable  1 each Topical During Hemodialysis (from stock)     levothyroxine  125 mcg Oral Daily     micafungin  100 mg Intravenous Q24H     multivitamins w/minerals  15 mL Per Feeding Tube Daily     mycophenolate  750 mg Oral BID IS    Or     mycophenolate  750 mg Oral or NG Tube BID IS     - MEDICATION INSTRUCTIONS -   Does not apply Once     polyethylene glycol  17 g Oral BID     protein modular  1 packet  Per Feeding Tube TID     senna-docusate  2 tablet Oral BID     sodium chloride  1 spray Both Nostrils Q4H     sodium chloride (PF)  3 mL Intravenous Q8H     sodium chloride (PF)  3 mL Intracatheter Q8H     sodium chloride (PF)  9 mL Intracatheter During Hemodialysis (from stock)     sodium chloride (PF)  9 mL Intracatheter During Hemodialysis (from stock)     tacrolimus  3 mg Per Feeding Tube BID IS     valGANciclovir  450 mg Oral Every Other Day    Or     valGANciclovir  450 mg Oral or NG Tube Every Other Day     vitamin D3  1,000 Units Oral BID       Physical Exam:     Admit Weight: 104.3 kg (230 lb)    Current Vitals:   /63   Pulse 71   Temp 98.5  F (36.9  C) (Axillary)   Resp 22   Wt 106.1 kg (233 lb 14.5 oz)   SpO2 98%   BMI 42.78 kg/m      CVP (mmHg): 5 mmHg    Vital sign ranges:    Temp:  [97.2  F (36.2  C)-99.9  F (37.7  C)] 98.5  F (36.9  C)  Pulse:  [71] 71  Heart Rate:  [53-96] 87  Resp:  [8-30] 22  BP: ()/(56-89) 103/63  MAP:  [74 mmHg-83 mmHg] 77 mmHg  Arterial Line BP: (118-128)/(54-63) 125/59  FiO2 (%):  [30 %-40 %] 40 %  SpO2:  [95 %-100 %] 98 %  Patient Vitals for the past 24 hrs:   BP Temp Temp src Pulse Heart Rate Resp SpO2 Weight   08/23/19 0900 103/63 98.5  F (36.9  C) Axillary -- 87 22 98 % --   08/23/19 0830 100/65 -- -- -- 74 -- 100 % --   08/23/19 0800 96/61 98.2  F (36.8  C) Axillary -- 75 16 99 % --   08/23/19 0730 94/56 -- -- -- 71 -- 98 % --   08/23/19 0700 98/57 -- -- -- 78 30 98 % --   08/23/19 0615 -- 97.2  F (36.2  C) -- 71 -- 30 -- --   08/23/19 0600 99/66 -- -- -- 55 15 100 % 106.1 kg (233 lb 14.5 oz)   08/23/19 0500 99/59 -- -- -- 56 24 99 % --   08/23/19 0445 -- -- -- -- 53 -- 100 % --   08/23/19 0430 110/71 -- -- -- 72 -- 100 % --   08/23/19 0400 97/59 97.9  F (36.6  C) Axillary -- 60 15 98 % --   08/23/19 0300 100/74 -- -- -- 96 -- 96 % --   08/23/19 0236 -- -- -- -- 88 -- 100 % --   08/23/19 0210 -- -- -- -- 95 -- 100 % --   08/23/19 0200 102/71 -- -- -- 86  23 100 % --   08/23/19 0100 101/63 -- -- -- 72 19 95 % --   08/23/19 0000 108/64 98.3  F (36.8  C) Axillary -- 79 29 100 % --   08/22/19 2300 115/89 -- -- -- 84 -- 97 % --   08/22/19 2200 117/62 99.3  F (37.4  C) -- -- 75 16 98 % --   08/22/19 2100 107/61 99.3  F (37.4  C) Axillary -- 77 23 98 % --   08/22/19 2015 106/62 99.9  F (37.7  C) Axillary -- 83 16 97 % --   08/22/19 2000 109/66 99.1  F (37.3  C) Axillary -- 85 16 98 % --   08/22/19 1900 108/65 -- -- -- 77 15 98 % --   08/22/19 1830 109/63 -- -- -- 82 -- 100 % --   08/22/19 1800 105/64 -- -- -- 86 15 100 % --   08/22/19 1700 -- -- -- -- 77 -- 100 % --   08/22/19 1600 -- 98  F (36.7  C) Axillary -- 77 16 100 % --   08/22/19 1500 -- -- -- -- 77 15 100 % --   08/22/19 1400 -- -- -- -- 81 8 97 % --   08/22/19 1300 -- -- -- -- 84 -- 96 % --   08/22/19 1200 -- -- -- -- 77 15 97 % --   08/22/19 1100 -- -- -- -- 80 9 98 % --     General Appearance: intubated  Skin: normal, warm  Heart: regular rate and rhythm  Lungs: intubated  Abdomen: soft, nondistended, incision sutured, c/d/i. JPs with serosanguinous output  : Forrester catheter removed, scant urine output  Extremities: edema: present bilaterally. 3+.  Neurologic: arousable, intermittently follows commands    Frailty Scores     There is no flowsheet data to display.          Data:   CMP  Recent Labs   Lab 08/23/19  0440 08/23/19  0439 08/22/19  2226  08/19/19  0330  08/17/19  2341  08/16/19  1119     --  142   < > 137   < > 129*   < > 126*   POTASSIUM 4.0  --  4.5   < > 4.2   < > 4.5   < > 5.0   CHLORIDE 110*  --  110*   < > 105   < > 97   < > 94   CO2 28  --  26   < > 18*   < > 23   < > 25   GLC 96  --  105*   < > 173*   < > 91   < > 82   BUN 26  --  29   < > 27   < > 45*   < > 40*   CR 1.01  --  1.01   < > 1.33*   < > 2.04*   < > 2.30*   GFRESTIMATED 61  --  61   < > 44*   < > 26*   < > 22*   GFRESTBLACK 70  --  70   < > 51*   < > 30*   < > 26*   JACOB 7.6*  --  7.6*   < > 9.3   < > 8.4*   < > 8.2*   ICAW   --  4.5 4.3*   < >  --    < >  --   --   --    MAG 2.5*  --  2.5*   < > 2.2   < > 2.5*  --   --    PHOS 3.1  --  3.3   < > 4.4   < > 3.8  --   --    AMYLASE  --   --   --   --  326*  --  45  --   --    LIPASE  --   --   --   --  2,509*  --   --   --  364   ALBUMIN 2.4*  --  2.2*   < > 2.3*   < > 3.4   < > 1.6*   BILITOTAL 4.7*  --  4.4*   < > 8.9*   < > 26.9*   < > 24.2*   ALKPHOS 116  --  119   < > 46   < > 87   < > 152*   *  --  156*   < > 342*   < > 75*   < > Canceled, Test credited   *  --  320*   < > 238*   < > 34   < > 51*    < > = values in this interval not displayed.     CBC  Recent Labs   Lab 08/23/19 0440 08/22/19 2226 08/18/19  2100   HGB 8.0* 8.3*   < > 12.0   WBC 5.4 6.6   < > 8.4   PLT 47* 57*   < > 142*   A1C  --   --   --  5.0    < > = values in this interval not displayed.     COAGS  Recent Labs   Lab 08/23/19  0440 08/22/19  2226  08/18/19  2100  08/18/19  1758   INR 1.26* 1.28*   < > 1.88*   < > 2.79*   PTT  --   --   --  42*  --  103*    < > = values in this interval not displayed.      Urinalysis  Recent Labs   Lab Test 08/16/19  1755 06/18/18  1019   COLOR Dark Yellow Yellow   APPEARANCE Clear Clear   URINEGLC Negative Negative   URINEBILI Moderate* Negative   URINEKETONE Negative Negative   SG 1.007 1.014   UBLD Negative Negative   URINEPH 5.5 8.0*   PROTEIN Negative Negative   NITRITE Negative Negative   LEUKEST Negative Small*   RBCU 0 1   WBCU 1 8*   UTPG  --  Unable to calculate due to low value     Virology:  Hepatitis C Antibody   Date Value Ref Range Status   08/18/2019 Nonreactive NR^Nonreactive Final     Comment:     Assay performance characteristics have not been established for newborns,   infants, and children

## 2019-08-24 ENCOUNTER — APPOINTMENT (OUTPATIENT)
Dept: GENERAL RADIOLOGY | Facility: CLINIC | Age: 59
DRG: 005 | End: 2019-08-24
Attending: OBSTETRICS & GYNECOLOGY
Payer: COMMERCIAL

## 2019-08-24 LAB
ABO + RH BLD: NORMAL
ABO + RH BLD: NORMAL
ALBUMIN SERPL-MCNC: 2.4 G/DL (ref 3.4–5)
ALBUMIN SERPL-MCNC: 2.6 G/DL (ref 3.4–5)
ALBUMIN SERPL-MCNC: 2.7 G/DL (ref 3.4–5)
ALBUMIN UR-MCNC: 100 MG/DL
ALP SERPL-CCNC: 110 U/L (ref 40–150)
ALP SERPL-CCNC: 115 U/L (ref 40–150)
ALP SERPL-CCNC: 116 U/L (ref 40–150)
ALT SERPL W P-5'-P-CCNC: 136 U/L (ref 0–50)
ALT SERPL W P-5'-P-CCNC: 151 U/L (ref 0–50)
ALT SERPL W P-5'-P-CCNC: 168 U/L (ref 0–50)
AMORPH CRY #/AREA URNS HPF: ABNORMAL /HPF
ANION GAP SERPL CALCULATED.3IONS-SCNC: 3 MMOL/L (ref 3–14)
ANION GAP SERPL CALCULATED.3IONS-SCNC: 6 MMOL/L (ref 3–14)
ANION GAP SERPL CALCULATED.3IONS-SCNC: 6 MMOL/L (ref 3–14)
APPEARANCE UR: ABNORMAL
AST SERPL W P-5'-P-CCNC: 48 U/L (ref 0–45)
AST SERPL W P-5'-P-CCNC: 51 U/L (ref 0–45)
AST SERPL W P-5'-P-CCNC: 59 U/L (ref 0–45)
BACTERIA #/AREA URNS HPF: ABNORMAL /HPF
BASE EXCESS BLDV CALC-SCNC: 1.3 MMOL/L
BASE EXCESS BLDV CALC-SCNC: 1.8 MMOL/L
BASE EXCESS BLDV CALC-SCNC: 2 MMOL/L
BASOPHILS # BLD AUTO: 0 10E9/L (ref 0–0.2)
BASOPHILS NFR BLD AUTO: 0.1 %
BASOPHILS NFR BLD AUTO: 0.1 %
BASOPHILS NFR BLD AUTO: 0.2 %
BILIRUB DIRECT SERPL-MCNC: 2.9 MG/DL (ref 0–0.2)
BILIRUB SERPL-MCNC: 3.5 MG/DL (ref 0.2–1.3)
BILIRUB SERPL-MCNC: 4.3 MG/DL (ref 0.2–1.3)
BILIRUB SERPL-MCNC: 4.5 MG/DL (ref 0.2–1.3)
BILIRUB UR QL STRIP: ABNORMAL
BLD GP AB SCN SERPL QL: NORMAL
BLD PROD TYP BPU: NORMAL
BLD UNIT ID BPU: 0
BLOOD BANK CMNT PATIENT-IMP: NORMAL
BLOOD PRODUCT CODE: NORMAL
BPU ID: NORMAL
BUN SERPL-MCNC: 19 MG/DL (ref 7–30)
BUN SERPL-MCNC: 21 MG/DL (ref 7–30)
BUN SERPL-MCNC: 22 MG/DL (ref 7–30)
CA-I BLD-MCNC: 4.5 MG/DL (ref 4.4–5.2)
CA-I BLD-MCNC: 4.7 MG/DL (ref 4.4–5.2)
CA-I BLD-MCNC: 4.8 MG/DL (ref 4.4–5.2)
CALCIUM SERPL-MCNC: 7.7 MG/DL (ref 8.5–10.1)
CALCIUM SERPL-MCNC: 7.9 MG/DL (ref 8.5–10.1)
CALCIUM SERPL-MCNC: 7.9 MG/DL (ref 8.5–10.1)
CHLORIDE SERPL-SCNC: 109 MMOL/L (ref 94–109)
CHLORIDE SERPL-SCNC: 109 MMOL/L (ref 94–109)
CHLORIDE SERPL-SCNC: 110 MMOL/L (ref 94–109)
CO2 SERPL-SCNC: 25 MMOL/L (ref 20–32)
CO2 SERPL-SCNC: 26 MMOL/L (ref 20–32)
CO2 SERPL-SCNC: 28 MMOL/L (ref 20–32)
COLOR UR AUTO: ABNORMAL
CREAT SERPL-MCNC: 0.79 MG/DL (ref 0.52–1.04)
CREAT SERPL-MCNC: 0.83 MG/DL (ref 0.52–1.04)
CREAT SERPL-MCNC: 0.87 MG/DL (ref 0.52–1.04)
DIFFERENTIAL METHOD BLD: ABNORMAL
EOSINOPHIL # BLD AUTO: 0.2 10E9/L (ref 0–0.7)
EOSINOPHIL # BLD AUTO: 0.3 10E9/L (ref 0–0.7)
EOSINOPHIL # BLD AUTO: 0.3 10E9/L (ref 0–0.7)
EOSINOPHIL NFR BLD AUTO: 3.5 %
EOSINOPHIL NFR BLD AUTO: 3.8 %
EOSINOPHIL NFR BLD AUTO: 3.8 %
ERYTHROCYTE [DISTWIDTH] IN BLOOD BY AUTOMATED COUNT: 19.1 % (ref 10–15)
ERYTHROCYTE [DISTWIDTH] IN BLOOD BY AUTOMATED COUNT: 19.1 % (ref 10–15)
ERYTHROCYTE [DISTWIDTH] IN BLOOD BY AUTOMATED COUNT: 19.2 % (ref 10–15)
FIBRINOGEN PPP-MCNC: 317 MG/DL (ref 200–420)
G6PD RBC-CCNC: 12.5 U/G HB (ref 9.9–16.6)
GFR SERPL CREATININE-BSD FRML MDRD: 73 ML/MIN/{1.73_M2}
GFR SERPL CREATININE-BSD FRML MDRD: 77 ML/MIN/{1.73_M2}
GFR SERPL CREATININE-BSD FRML MDRD: 82 ML/MIN/{1.73_M2}
GLUCOSE BLDC GLUCOMTR-MCNC: 91 MG/DL (ref 70–99)
GLUCOSE SERPL-MCNC: 108 MG/DL (ref 70–99)
GLUCOSE SERPL-MCNC: 114 MG/DL (ref 70–99)
GLUCOSE SERPL-MCNC: 121 MG/DL (ref 70–99)
GLUCOSE UR STRIP-MCNC: NEGATIVE MG/DL
GRAN CASTS #/AREA URNS LPF: 13 /LPF
HCO3 BLDV-SCNC: 28 MMOL/L (ref 21–28)
HCT VFR BLD AUTO: 24.5 % (ref 35–47)
HCT VFR BLD AUTO: 24.6 % (ref 35–47)
HCT VFR BLD AUTO: 26.2 % (ref 35–47)
HGB BLD-MCNC: 7.6 G/DL (ref 11.7–15.7)
HGB BLD-MCNC: 7.9 G/DL (ref 11.7–15.7)
HGB BLD-MCNC: 8 G/DL (ref 11.7–15.7)
HGB UR QL STRIP: ABNORMAL
HYALINE CASTS #/AREA URNS LPF: 51 /LPF (ref 0–2)
IMM GRANULOCYTES # BLD: 0.1 10E9/L (ref 0–0.4)
IMM GRANULOCYTES # BLD: 0.2 10E9/L (ref 0–0.4)
IMM GRANULOCYTES # BLD: 0.2 10E9/L (ref 0–0.4)
IMM GRANULOCYTES NFR BLD: 2.1 %
IMM GRANULOCYTES NFR BLD: 2.2 %
IMM GRANULOCYTES NFR BLD: 2.2 %
INR PPP: 1.25 (ref 0.86–1.14)
KETONES UR STRIP-MCNC: NEGATIVE MG/DL
LEUKOCYTE ESTERASE UR QL STRIP: ABNORMAL
LYMPHOCYTES # BLD AUTO: 0.2 10E9/L (ref 0.8–5.3)
LYMPHOCYTES # BLD AUTO: 0.3 10E9/L (ref 0.8–5.3)
LYMPHOCYTES # BLD AUTO: 0.3 10E9/L (ref 0.8–5.3)
LYMPHOCYTES NFR BLD AUTO: 3.3 %
LYMPHOCYTES NFR BLD AUTO: 3.7 %
LYMPHOCYTES NFR BLD AUTO: 3.8 %
MAGNESIUM SERPL-MCNC: 2.4 MG/DL (ref 1.6–2.3)
MAGNESIUM SERPL-MCNC: 2.4 MG/DL (ref 1.6–2.3)
MCH RBC QN AUTO: 30.1 PG (ref 26.5–33)
MCH RBC QN AUTO: 30.4 PG (ref 26.5–33)
MCH RBC QN AUTO: 31.2 PG (ref 26.5–33)
MCHC RBC AUTO-ENTMCNC: 30.5 G/DL (ref 31.5–36.5)
MCHC RBC AUTO-ENTMCNC: 30.9 G/DL (ref 31.5–36.5)
MCHC RBC AUTO-ENTMCNC: 32.2 G/DL (ref 31.5–36.5)
MCV RBC AUTO: 97 FL (ref 78–100)
MCV RBC AUTO: 98 FL (ref 78–100)
MCV RBC AUTO: 99 FL (ref 78–100)
MONOCYTES # BLD AUTO: 0.7 10E9/L (ref 0–1.3)
MONOCYTES # BLD AUTO: 0.8 10E9/L (ref 0–1.3)
MONOCYTES # BLD AUTO: 0.8 10E9/L (ref 0–1.3)
MONOCYTES NFR BLD AUTO: 10.2 %
MONOCYTES NFR BLD AUTO: 10.8 %
MONOCYTES NFR BLD AUTO: 11.8 %
MUCOUS THREADS #/AREA URNS LPF: PRESENT /LPF
NEUTROPHILS # BLD AUTO: 5 10E9/L (ref 1.6–8.3)
NEUTROPHILS # BLD AUTO: 5.4 10E9/L (ref 1.6–8.3)
NEUTROPHILS # BLD AUTO: 6.2 10E9/L (ref 1.6–8.3)
NEUTROPHILS NFR BLD AUTO: 78.3 %
NEUTROPHILS NFR BLD AUTO: 79.6 %
NEUTROPHILS NFR BLD AUTO: 80.5 %
NITRATE UR QL: NEGATIVE
NRBC # BLD AUTO: 0 10*3/UL
NRBC BLD AUTO-RTO: 0 /100
O2/TOTAL GAS SETTING VFR VENT: 40 %
O2/TOTAL GAS SETTING VFR VENT: 40 %
O2/TOTAL GAS SETTING VFR VENT: ABNORMAL %
PCO2 BLDV: 52 MM HG (ref 40–50)
PCO2 BLDV: 53 MM HG (ref 40–50)
PCO2 BLDV: 58 MM HG (ref 40–50)
PH BLDV: 7.3 PH (ref 7.32–7.43)
PH BLDV: 7.34 PH (ref 7.32–7.43)
PH BLDV: 7.34 PH (ref 7.32–7.43)
PH UR STRIP: 5.5 PH (ref 5–7)
PHOSPHATE SERPL-MCNC: 3.3 MG/DL (ref 2.5–4.5)
PHOSPHATE SERPL-MCNC: 3.5 MG/DL (ref 2.5–4.5)
PHOSPHATE SERPL-MCNC: 4.2 MG/DL (ref 2.5–4.5)
PLATELET # BLD AUTO: 41 10E9/L (ref 150–450)
PLATELET # BLD AUTO: 48 10E9/L (ref 150–450)
PLATELET # BLD AUTO: 50 10E9/L (ref 150–450)
PO2 BLDV: 48 MM HG (ref 25–47)
PO2 BLDV: 49 MM HG (ref 25–47)
PO2 BLDV: 53 MM HG (ref 25–47)
POTASSIUM SERPL-SCNC: 4.3 MMOL/L (ref 3.4–5.3)
POTASSIUM SERPL-SCNC: 4.3 MMOL/L (ref 3.4–5.3)
POTASSIUM SERPL-SCNC: 4.5 MMOL/L (ref 3.4–5.3)
PROT SERPL-MCNC: 4.6 G/DL (ref 6.8–8.8)
PROT SERPL-MCNC: 4.8 G/DL (ref 6.8–8.8)
PROT SERPL-MCNC: 4.9 G/DL (ref 6.8–8.8)
RBC # BLD AUTO: 2.5 10E12/L (ref 3.8–5.2)
RBC # BLD AUTO: 2.53 10E12/L (ref 3.8–5.2)
RBC # BLD AUTO: 2.66 10E12/L (ref 3.8–5.2)
RBC #/AREA URNS AUTO: 43 /HPF (ref 0–2)
SODIUM SERPL-SCNC: 140 MMOL/L (ref 133–144)
SODIUM SERPL-SCNC: 141 MMOL/L (ref 133–144)
SODIUM SERPL-SCNC: 141 MMOL/L (ref 133–144)
SOURCE: ABNORMAL
SP GR UR STRIP: 1.02 (ref 1–1.03)
SPECIMEN EXP DATE BLD: NORMAL
SQUAMOUS #/AREA URNS AUTO: 4 /HPF (ref 0–1)
TRANS CELLS #/AREA URNS HPF: 2 /HPF (ref 0–1)
TRANSFUSION STATUS PATIENT QL: NORMAL
TRANSFUSION STATUS PATIENT QL: NORMAL
UROBILINOGEN UR STRIP-MCNC: NORMAL MG/DL (ref 0–2)
WBC # BLD AUTO: 6.3 10E9/L (ref 4–11)
WBC # BLD AUTO: 6.8 10E9/L (ref 4–11)
WBC # BLD AUTO: 7.7 10E9/L (ref 4–11)
WBC #/AREA URNS AUTO: 36 /HPF (ref 0–5)

## 2019-08-24 PROCEDURE — 80053 COMPREHEN METABOLIC PANEL: CPT | Performed by: GENERAL ACUTE CARE HOSPITAL

## 2019-08-24 PROCEDURE — 94003 VENT MGMT INPAT SUBQ DAY: CPT

## 2019-08-24 PROCEDURE — 90947 DIALYSIS REPEATED EVAL: CPT

## 2019-08-24 PROCEDURE — 25000128 H RX IP 250 OP 636: Performed by: PHYSICIAN ASSISTANT

## 2019-08-24 PROCEDURE — 27210429 ZZH NUTRITION PRODUCT INTERMEDIATE LITER

## 2019-08-24 PROCEDURE — P9047 ALBUMIN (HUMAN), 25%, 50ML: HCPCS | Performed by: NURSE PRACTITIONER

## 2019-08-24 PROCEDURE — 25000132 ZZH RX MED GY IP 250 OP 250 PS 637: Performed by: SURGERY

## 2019-08-24 PROCEDURE — 82330 ASSAY OF CALCIUM: CPT | Performed by: OBSTETRICS & GYNECOLOGY

## 2019-08-24 PROCEDURE — 86900 BLOOD TYPING SEROLOGIC ABO: CPT | Performed by: STUDENT IN AN ORGANIZED HEALTH CARE EDUCATION/TRAINING PROGRAM

## 2019-08-24 PROCEDURE — 25000128 H RX IP 250 OP 636: Performed by: NURSE PRACTITIONER

## 2019-08-24 PROCEDURE — 00000146 ZZHCL STATISTIC GLUCOSE BY METER IP

## 2019-08-24 PROCEDURE — 85025 COMPLETE CBC W/AUTO DIFF WBC: CPT | Performed by: GENERAL ACUTE CARE HOSPITAL

## 2019-08-24 PROCEDURE — 85384 FIBRINOGEN ACTIVITY: CPT | Performed by: STUDENT IN AN ORGANIZED HEALTH CARE EDUCATION/TRAINING PROGRAM

## 2019-08-24 PROCEDURE — 86901 BLOOD TYPING SEROLOGIC RH(D): CPT | Performed by: STUDENT IN AN ORGANIZED HEALTH CARE EDUCATION/TRAINING PROGRAM

## 2019-08-24 PROCEDURE — 25000132 ZZH RX MED GY IP 250 OP 250 PS 637: Performed by: INTERNAL MEDICINE

## 2019-08-24 PROCEDURE — 84100 ASSAY OF PHOSPHORUS: CPT | Performed by: GENERAL ACUTE CARE HOSPITAL

## 2019-08-24 PROCEDURE — 86850 RBC ANTIBODY SCREEN: CPT | Performed by: STUDENT IN AN ORGANIZED HEALTH CARE EDUCATION/TRAINING PROGRAM

## 2019-08-24 PROCEDURE — 25800030 ZZH RX IP 258 OP 636: Performed by: PHYSICIAN ASSISTANT

## 2019-08-24 PROCEDURE — 25000131 ZZH RX MED GY IP 250 OP 636 PS 637: Performed by: PHYSICIAN ASSISTANT

## 2019-08-24 PROCEDURE — 25000125 ZZHC RX 250: Performed by: INTERNAL MEDICINE

## 2019-08-24 PROCEDURE — 85610 PROTHROMBIN TIME: CPT | Performed by: STUDENT IN AN ORGANIZED HEALTH CARE EDUCATION/TRAINING PROGRAM

## 2019-08-24 PROCEDURE — 83735 ASSAY OF MAGNESIUM: CPT | Performed by: STUDENT IN AN ORGANIZED HEALTH CARE EDUCATION/TRAINING PROGRAM

## 2019-08-24 PROCEDURE — 40000275 ZZH STATISTIC RCP TIME EA 10 MIN

## 2019-08-24 PROCEDURE — 82803 BLOOD GASES ANY COMBINATION: CPT | Performed by: OBSTETRICS & GYNECOLOGY

## 2019-08-24 PROCEDURE — 80053 COMPREHEN METABOLIC PANEL: CPT | Performed by: STUDENT IN AN ORGANIZED HEALTH CARE EDUCATION/TRAINING PROGRAM

## 2019-08-24 PROCEDURE — 82248 BILIRUBIN DIRECT: CPT | Performed by: STUDENT IN AN ORGANIZED HEALTH CARE EDUCATION/TRAINING PROGRAM

## 2019-08-24 PROCEDURE — 82330 ASSAY OF CALCIUM: CPT | Performed by: STUDENT IN AN ORGANIZED HEALTH CARE EDUCATION/TRAINING PROGRAM

## 2019-08-24 PROCEDURE — 87086 URINE CULTURE/COLONY COUNT: CPT | Performed by: OBSTETRICS & GYNECOLOGY

## 2019-08-24 PROCEDURE — 40000986 XR ABDOMEN PORT 1 VW

## 2019-08-24 PROCEDURE — 81001 URINALYSIS AUTO W/SCOPE: CPT | Performed by: OBSTETRICS & GYNECOLOGY

## 2019-08-24 PROCEDURE — 82803 BLOOD GASES ANY COMBINATION: CPT | Performed by: STUDENT IN AN ORGANIZED HEALTH CARE EDUCATION/TRAINING PROGRAM

## 2019-08-24 PROCEDURE — 85025 COMPLETE CBC W/AUTO DIFF WBC: CPT | Performed by: STUDENT IN AN ORGANIZED HEALTH CARE EDUCATION/TRAINING PROGRAM

## 2019-08-24 PROCEDURE — 20000004 ZZH R&B ICU UMMC

## 2019-08-24 PROCEDURE — 84100 ASSAY OF PHOSPHORUS: CPT | Performed by: STUDENT IN AN ORGANIZED HEALTH CARE EDUCATION/TRAINING PROGRAM

## 2019-08-24 PROCEDURE — 25000132 ZZH RX MED GY IP 250 OP 250 PS 637: Performed by: STUDENT IN AN ORGANIZED HEALTH CARE EDUCATION/TRAINING PROGRAM

## 2019-08-24 PROCEDURE — 83735 ASSAY OF MAGNESIUM: CPT | Performed by: GENERAL ACUTE CARE HOSPITAL

## 2019-08-24 PROCEDURE — 25000132 ZZH RX MED GY IP 250 OP 250 PS 637: Performed by: NURSE PRACTITIONER

## 2019-08-24 RX ORDER — DEXTROSE MONOHYDRATE, SODIUM CHLORIDE, AND POTASSIUM CHLORIDE 50; 1.49; 4.5 G/1000ML; G/1000ML; G/1000ML
INJECTION, SOLUTION INTRAVENOUS CONTINUOUS
Status: DISCONTINUED | OUTPATIENT
Start: 2019-08-25 | End: 2019-08-25

## 2019-08-24 RX ORDER — VALGANCICLOVIR 450 MG/1
450 TABLET, FILM COATED ORAL
Status: DISCONTINUED | OUTPATIENT
Start: 2019-08-26 | End: 2019-08-29

## 2019-08-24 RX ORDER — FAMOTIDINE 20 MG/1
20 TABLET, FILM COATED ORAL DAILY
Status: DISCONTINUED | OUTPATIENT
Start: 2019-08-25 | End: 2019-09-23 | Stop reason: HOSPADM

## 2019-08-24 RX ORDER — VALGANCICLOVIR HYDROCHLORIDE 50 MG/ML
450 POWDER, FOR SOLUTION ORAL
Status: DISCONTINUED | OUTPATIENT
Start: 2019-08-26 | End: 2019-08-29

## 2019-08-24 RX ADMIN — LEVOTHYROXINE SODIUM 125 MCG: 0.12 TABLET ORAL at 07:38

## 2019-08-24 RX ADMIN — PREDNISONE 5 MG: 5 SOLUTION ORAL at 07:38

## 2019-08-24 RX ADMIN — Medication 3 MG: at 18:39

## 2019-08-24 RX ADMIN — ASPIRIN 325 MG ORAL TABLET 325 MG: 325 PILL ORAL at 07:38

## 2019-08-24 RX ADMIN — HYDROMORPHONE HYDROCHLORIDE 0.5 MG: 1 INJECTION, SOLUTION INTRAMUSCULAR; INTRAVENOUS; SUBCUTANEOUS at 19:32

## 2019-08-24 RX ADMIN — SALINE NASAL SPRAY 1 SPRAY: 1.5 SOLUTION NASAL at 00:08

## 2019-08-24 RX ADMIN — OXYCODONE HYDROCHLORIDE 5 MG: 5 TABLET ORAL at 01:54

## 2019-08-24 RX ADMIN — ALBUMIN HUMAN 12.5 G: 0.25 SOLUTION INTRAVENOUS at 00:08

## 2019-08-24 RX ADMIN — HYDROMORPHONE HYDROCHLORIDE 0.5 MG: 1 INJECTION, SOLUTION INTRAMUSCULAR; INTRAVENOUS; SUBCUTANEOUS at 04:03

## 2019-08-24 RX ADMIN — Medication 1 PACKET: at 14:13

## 2019-08-24 RX ADMIN — SALINE NASAL SPRAY 1 SPRAY: 1.5 SOLUTION NASAL at 20:01

## 2019-08-24 RX ADMIN — MELATONIN 1000 UNITS: at 07:38

## 2019-08-24 RX ADMIN — Medication 3 MG: at 07:37

## 2019-08-24 RX ADMIN — OXYCODONE HYDROCHLORIDE 5 MG: 5 TABLET ORAL at 07:46

## 2019-08-24 RX ADMIN — SALINE NASAL SPRAY 1 SPRAY: 1.5 SOLUTION NASAL at 07:38

## 2019-08-24 RX ADMIN — Medication 1 PACKET: at 07:38

## 2019-08-24 RX ADMIN — MYCOPHENOLATE MOFETIL 750 MG: 200 POWDER, FOR SUSPENSION ORAL at 18:39

## 2019-08-24 RX ADMIN — Medication 2 DROP: at 01:54

## 2019-08-24 RX ADMIN — SALINE NASAL SPRAY 1 SPRAY: 1.5 SOLUTION NASAL at 23:32

## 2019-08-24 RX ADMIN — OXYCODONE HYDROCHLORIDE 5 MG: 5 TABLET ORAL at 21:34

## 2019-08-24 RX ADMIN — ALBUMIN HUMAN 12.5 G: 0.25 SOLUTION INTRAVENOUS at 05:38

## 2019-08-24 RX ADMIN — SALINE NASAL SPRAY 1 SPRAY: 1.5 SOLUTION NASAL at 04:37

## 2019-08-24 RX ADMIN — Medication 2 DROP: at 20:01

## 2019-08-24 RX ADMIN — OXYCODONE HYDROCHLORIDE 5 MG: 5 TABLET ORAL at 12:06

## 2019-08-24 RX ADMIN — FAMOTIDINE 20 MG: 20 TABLET, FILM COATED ORAL at 07:38

## 2019-08-24 RX ADMIN — HYDROMORPHONE HYDROCHLORIDE 0.5 MG: 1 INJECTION, SOLUTION INTRAMUSCULAR; INTRAVENOUS; SUBCUTANEOUS at 12:06

## 2019-08-24 RX ADMIN — MYCOPHENOLATE MOFETIL 750 MG: 200 POWDER, FOR SUSPENSION ORAL at 07:39

## 2019-08-24 RX ADMIN — Medication 2 DROP: at 07:48

## 2019-08-24 RX ADMIN — SALINE NASAL SPRAY 1 SPRAY: 1.5 SOLUTION NASAL at 12:04

## 2019-08-24 RX ADMIN — CALCIUM CHLORIDE, MAGNESIUM CHLORIDE, SODIUM CHLORIDE, SODIUM BICARBONATE, POTASSIUM CHLORIDE AND SODIUM PHOSPHATE DIBASIC DIHYDRATE 12.5 ML/KG/HR: 3.68; 3.05; 6.34; 3.09; .314; .187 INJECTION INTRAVENOUS at 01:58

## 2019-08-24 RX ADMIN — MELATONIN 1000 UNITS: at 20:00

## 2019-08-24 RX ADMIN — OXYCODONE HYDROCHLORIDE 5 MG: 5 TABLET ORAL at 06:02

## 2019-08-24 RX ADMIN — HYDROMORPHONE HYDROCHLORIDE 0.5 MG: 1 INJECTION, SOLUTION INTRAMUSCULAR; INTRAVENOUS; SUBCUTANEOUS at 23:31

## 2019-08-24 RX ADMIN — Medication 2 DROP: at 14:14

## 2019-08-24 RX ADMIN — MULTIVITAMIN 15 ML: LIQUID ORAL at 07:53

## 2019-08-24 RX ADMIN — CALCIUM CHLORIDE, MAGNESIUM CHLORIDE, SODIUM CHLORIDE, SODIUM BICARBONATE, POTASSIUM CHLORIDE AND SODIUM PHOSPHATE DIBASIC DIHYDRATE 12.5 ML/KG/HR: 3.68; 3.05; 6.34; 3.09; .314; .187 INJECTION INTRAVENOUS at 01:57

## 2019-08-24 RX ADMIN — CALCIUM CHLORIDE, MAGNESIUM CHLORIDE, SODIUM CHLORIDE, SODIUM BICARBONATE, POTASSIUM CHLORIDE AND SODIUM PHOSPHATE DIBASIC DIHYDRATE 12.5 ML/KG/HR: 3.68; 3.05; 6.34; 3.09; .314; .187 INJECTION INTRAVENOUS at 05:36

## 2019-08-24 RX ADMIN — MICAFUNGIN SODIUM 100 MG: 10 INJECTION, POWDER, LYOPHILIZED, FOR SOLUTION INTRAVENOUS at 12:06

## 2019-08-24 RX ADMIN — SALINE NASAL SPRAY 1 SPRAY: 1.5 SOLUTION NASAL at 16:08

## 2019-08-24 RX ADMIN — CALCIUM CHLORIDE, MAGNESIUM CHLORIDE, SODIUM CHLORIDE, SODIUM BICARBONATE, POTASSIUM CHLORIDE AND SODIUM PHOSPHATE DIBASIC DIHYDRATE 12.5 ML/KG/HR: 3.68; 3.05; 6.34; 3.09; .314; .187 INJECTION INTRAVENOUS at 05:34

## 2019-08-24 ASSESSMENT — ACTIVITIES OF DAILY LIVING (ADL)
ADLS_ACUITY_SCORE: 26

## 2019-08-24 NOTE — PROGRESS NOTES
Nephrology  Progress note- continuous dialysis visit  08/24/2019     Nicci Pemberton was seen once on CRRT for volume management and dialysis prescription. Nicci Pemberton's blood pressure has been stable and she is tolerating CRRT.    Laboratory results and nurses' notes were reviewed.   No changes to management of volume, acidosis, or electrolytes.   Diagnosis - GAMAL  hypervolemia    BP is good, she has high output from drains.  CRRT filter about to clot, will discontinue CRRT.  Assess daily for IHD need.      Shari Goetz MD  [facility]  Department of Medicine  Division of Renal Disease and Hypertension  [phone]

## 2019-08-24 NOTE — PLAN OF CARE
Neuro: Pt alert/lethargic. Intermittently nods when asked questions. Appears anxious before beginning cares. PERRL.  CV: Afebrile. Blood warmer on via CRRT. HR sinus rhythm 80's-90's with rare PVCs. BPs stable.  Pulm: Pt intubated, RR upper 40's when anxious, RR 12-20 when resting. SIMV vent settings. Lungs clear, diminished in bases. PS started at 0600 at 15/5 and pt tolerating well  GI:Multiple loose BM's yesterday, laxatives held this evening. BS present. TF @ 45 with standard flush.   : Pt on menses. Pt had 1x large incontinent void, bladder scan afterward for 22cc. Pt on CRRT, goal net negative 2400cc per 24 hrs. Net negative 2,425 for day yesterday.  Skin: Packing in place to left nostril. Pt +4 edematous over body, weeping in extremities. JPx2 on abdomen. Clamshell incision closed with sutures and SILVIA. Blister in left inner thigh. Pt excoriated in groin.  Musculoskeletal: Able to SAUER  Pain: Pt grimacing, appearing anxious and tachypneic when showing signs of pain. PRN dilaudid and PRN oxycodone given per orders with good results.  Lines: Left internal jugular Dialysis catheter, Right TL internal jugular, PIVx2. OG clamped. NG infusing with TF's    See flowsheets for further assessment    Plan: Plan to extubate in AM after PS trial. Transition to intermittent hemodialysis when pt off CRRT.

## 2019-08-24 NOTE — PROGRESS NOTES
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    CHANGES TODAY:  - change pressure support to 12 when on PST  - VBG  - compression stockings    Neurological  # Acute postoperative pain  # hx of hepatic encephalopathy  - Monitor neurological status. Delirium preventions and precautions.   - Pain: prn dilaudid, prn oxycodone, no acetaminophen   - Sedation: off Precedex     Pulmonary  # Acute respiratory failure  - VENT: CPAP +/5/PS12/40%   - PST today. HOB elevation     Cardiovascular  # Shock - hypovolemic/hemorrhagic resolved  - Monitor hemodynamic status  - MAPs >65. Off pressors  - Dobutamine stress test 6/19/2019 negative for ischemia     # hx of HTN  - PTA spironolactone 100 mg BID, bumetanide 1 mg   - Hold for now.      Gastroenterology/Nutrition  # ESLD 2/2 Alpha-1-antitrypsin and ANGULO s/p DDLT 8/18/19    # heterozygous (MZ genotype) for alpha-1-antitrypsin with secondary iron overload  # hyperbilirubinemia  # umbilical hernia  - MELD at transplant: 39  - most recent EGD 5/7/2019 without esophageal or gastric varices  - bowel regimen: Miralax, Senna  - JPs x 2 with high output (>3 L). Will give albumin 25% 12.5 grams every 6 hours x 4 (last dose today).       # Protein calorie deficit malnutrition   - No indication for parenteral nutrition.  - Tube feeds at 40 ml/hr via gastric feeding tube.      - PTA famotidine 20 mg BID     Renal/Fluid/Electrolytes  # acute kidney injury, baseline Cr ~0.6  # history of hepatorenal syndrome  - Monitor intake and output.  - UOP overnight, continue to follow closely  - CRRT continue with goal of -50 to 100/hr. When circuit clots off, will transition to HD.      Endocrine  # Stress and steroid hyperglycemia- resolved  - Goal to keep BG< 180 for optimal wound healing   -  discontinue insulin. Continue daily glucose checks.      # hx of hypothyroidism  - PTA levothyroxine 125 mcg daily     ID  # Immunosuppression  - Continue micafungin x 7 days (last dose 8/24/2019)  - Continue ppx: valcyte  - continue immunosuppression: methylprednisolone, mycophenolate, tacrolimus (initiated 8/19/2019)  - T max 99.9. CXR clear, f/u blood clx     # Hx of SBP  - Rifaximin discontinued (8/19/2019)     Heme  # Acute blood loss anemia, EBL 5L  # thrombocytopenia  # coagulopathy  - Transfuse if hgb <7.0 or signs/symptoms of hypoperfusion  - Goal INR <2, Fibrinogen >200, plt >30K  - type and screen q72h  - hold DDAVP  - Aspirin 325mg     Musculoskeletal  # weakness and deconditioning of critical illness   # osteoarthritis  - Physical and occupational therapy consult when extubated     General Cares/Prophylaxis  DVT Prophylaxis: Pneumatic Compression Devices  GI Prophylaxis: pepcid BID   Restraints: No   Lines/ tubes/ drains:  - ETT  - L abd JPx1  - R abd OBED x1  - R internal jugular CVC  - L internal jugular dialysis catheter  - PIVx2  Code: Full  Disposition: Surgical ICU    Patient seen, findings and plan discussed with surgical ICU staff, Dr. Allen.    Malachi Paredes MD  SICU Resident      I, Nickie Allen, was present with the Resident who participated in the service and in the documentation of the note.  I have verified the history and personally performed the physical exam and medical decision making.  I agree with the assessment and plan of care as documented in the note.      I personally reviewed vital signs, medications, labs and imaging.    Key findings:  Post operative ventilatory dependence, requiring prolonged mechanical ventilation. Pressure support today, starting at 15/5 and walked down to 7/5 and subsequently extubated  Renal failure, intially requiring CRRT, now with some evidence of return of kidney function given urine production, continue to monitor.  Ongoing  thrombocytopenia, replace if bleeding  Protein calorie malnutrition, continue tube feeds, will discuss plan with Transplant team after patient extubated, and consider advancement of NG tube after discussion with ENT    Nickie Allen MD  Date of Service (when I saw the patient):   8/24/19      ====================================    TODAY'S PROGRESS:   SUBJECTIVE:   No acute events overnight. Hbg and plts within goal range. Patient continues to fail long-term PST, requires more practice. Patient continues to have low UOP.        OBJECTIVE:   1. VITAL SIGNS:   Temp:  [97.7  F (36.5  C)-99  F (37.2  C)] 98  F (36.7  C)  Heart Rate:  [] 79  Resp:  [11-32] 16  BP: ()/() 108/61  FiO2 (%):  [40 %] 40 %  SpO2:  [95 %-100 %] 99 %  Ventilation Mode: (S) CPAP/PS  (Continuous positive airway pressure with Pressure Support)  FiO2 (%): 40 %  Rate Set (breaths/minute): 10 breaths/min  Tidal Volume Set (mL): 450 mL  PEEP (cm H2O): 5 cmH2O  Pressure Support (cm H2O): (S) 10 cmH2O  Oxygen Concentration (%): 40 %  Resp: 16      2. INTAKE/ OUTPUT:   I/O last 3 completed shifts:  In: 1760.78 [I.V.:89.78; Other:1; NG/GT:230]  Out: 3854 [Drains:2320; Other:1534]    3. PHYSICAL EXAMINATION:   General:  Neuro: NAD, opens eyes to voice  Resp: Breathing non-labored, intubated  CV: RRR  Abdomen: Soft, Non-distended, Non-tender  Incisions: clean, dry, intact  Extremities: anasarca    4. INVESTIGATIONS:   Arterial Blood Gases   Recent Labs   Lab 08/23/19  0439 08/22/19  1549 08/22/19  1309 08/22/19  1035   PH 7.48* 7.40 7.31* 7.33*   PCO2 36 41 53* 48*   PO2 72* 111* 84 83   HCO3 26 26 27 25     Complete Blood Count   Recent Labs   Lab 08/24/19  1016 08/24/19  0401 08/23/19  2211 08/23/19  1556   WBC 7.7 6.8 6.2 5.7   HGB 7.9* 8.0* 7.8* 7.5*   PLT 50* 48* 44* 51*     Basic Metabolic Panel  Recent Labs   Lab 08/24/19  1016 08/24/19  0401 08/23/19  2211 08/23/19  1556    141 142 140   POTASSIUM 4.3 4.3 4.1 3.8    CHLORIDE 109 109 110* 107   CO2 25 26 28 24   BUN 19 21 22 22   CR 0.79 0.83 0.80 0.79   * 108* 94 109*     Liver Function Tests  Recent Labs   Lab 08/24/19  1016 08/24/19  0401 08/23/19  2211 08/23/19  1556 08/23/19  1021 08/23/19  0440 08/22/19  2226   AST 51* 59* 73* 76* 96* 120* 156*   * 168* 194* 204* 235* 269* 320*   ALKPHOS 115 110 110 103 108 116 119   BILITOTAL 4.5* 4.3* 4.4* 4.6* 4.6* 4.7* 4.4*   ALBUMIN 2.6* 2.7* 2.7* 4.2 2.6* 2.4* 2.2*   INR  --  1.25*  --   --  1.24* 1.26* 1.28*     Pancreatic Enzymes  Recent Labs   Lab 08/19/19  0330 08/17/19  2341   LIPASE 2,509*  --    AMYLASE 326* 45     Coagulation Profile  Recent Labs   Lab 08/24/19  0401 08/23/19  1021 08/23/19  0440 08/22/19  2226  08/18/19  2100  08/18/19  1758  08/18/19  1605  08/17/19  2341   INR 1.25* 1.24* 1.26* 1.28*   < > 1.88*   < > 2.79*   < > 3.38*   < > 3.64*   PTT  --   --   --   --   --  42*  --  103*  --  181*  --  66*    < > = values in this interval not displayed.         5. RADIOLOGY:   No results found for this or any previous visit (from the past 24 hour(s)).    =========================================      @[facility] [place]@

## 2019-08-24 NOTE — SIGNIFICANT EVENT
MARQUISE 1600  at bedside, extubated per RT- placed on oxy mask ( has packing in nose). sats 100%- no change in lung sounds- no change in vitals- awake alert confused- hollering out non sense

## 2019-08-24 NOTE — PROGRESS NOTES
Immunosuppression Note:    Nicci Pemberton is a 59 year old female who is seen today  for immunosuppression management     I, Mandeep Alford MD, I have examined the patient with the resident/PA/Fellow, discussed and agree with the note and findings.  I have reviewed today's vital signs, medications, labs and imaging. I reviewed the immunosuppression medications and levels. I spoke to the patient/family and explained below clinical details and answered all the questions      Transplant Surgery  Inpatient Daily Progress Note  08/24/2019    Assessment & Plan: Nicci Pemberton is a 58 yo female with a past medical history significant for end stage liver disease 2/2 ANGULO and alpha 1 anti-trypsin deficiency now s/p DD OLT on 8/18/19    Graft function: Good, AST and ALT now trending down. Tbili remains 4.3, stable. Repeat US showed good flow. High drain output.  Immunosuppression management:  mg BID, Tac dose currently 3 mg BID,  level pending for today. Continue pred 5mg daily until tac level therapeutic. Complexity of management: Medium. Contributing factors: anemia and induction  Hematology: Acute blood loss anemia. Transfusion goal hgb > 7 at this time. Hgb stable at this time. Last transfused 3 units 8/20 overnight.  HEENT: Post-op delonte bleeding, examination revealed no direct lesion, overall friable mucosa. Re-packed 8/21, ENT plans to leave packing in for 5 days. Re-bleeding with feeding tube placement 8/22, ENT re-packed around tube. Now stable.  Cardiorespiratory: Continue pressure support trials and assess for extubation as able.  Hx of hypertension on spironolactone and bumex at home, will hold off at this time.  GI/Nutrition: NJ placement attempted 8/22, is currently distal gastric placement, unable to be advanced further during placement due to kinking and nasal bleeding. Continue tube feeds at goal at this time, may reassess for advancement when patient is able to be extubated.  Endocrine: Steroid  induced hyperglycemia, resolving, on sliding scale insulin.  Fluid/Electrolytes: GAMAL- will continue CRRT at this time, plan to stop when circuit clots. Making some urine.  : Incontinent urine x1  Infectious disease: Ppx with micafungin x 10 days, zosyn completed.  Prophylaxis: DVT, fall, GI, fungal  Disposition: 4A, appreciate critical cares per SICU team    Medical Decision Making: Medium  Subsequent visit 78223 (moderate level decision making)    VEENA/Fellow/Resident Provider: Sharon Merchant NP [id number]    Faculty: Mandeep Alford M.D.    __________________________________________________________________  Transplant History: Admitted 8/16/2019 for acute decompensated ANGULO.   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    8/18/2019 (Liver), Postoperative day: 6     Interval History: Unable to obtain a history from the patient due to critical condition, intubation   Appears anxious. PS trial ongoing. Incont urine x1. Watery BMs after aggressive bowel regimen.    ROS:   A 10-point review of systems was negative except as noted above.    Curent Meds:    aspirin  325 mg Oral Daily     carboxymethylcellulose PF  2 drop Both Eyes Q6H     famotidine  20 mg Oral BID     levothyroxine  125 mcg Oral Daily     micafungin  100 mg Intravenous Q24H     multivitamins w/minerals  15 mL Per Feeding Tube Daily     mycophenolate  750 mg Oral BID IS    Or     mycophenolate  750 mg Oral or NG Tube BID IS     polyethylene glycol  17 g Oral BID     predniSONE  5 mg Per Feeding Tube Daily     protein modular  1 packet Per Feeding Tube TID     senna-docusate  2 tablet Oral BID     sodium chloride  1 spray Both Nostrils Q4H     sodium chloride (PF)  3 mL Intravenous Q8H     sodium chloride (PF)  3 mL Intracatheter Q8H     tacrolimus  3 mg Per Feeding Tube BID IS     valGANciclovir  450 mg Oral Every Other Day    Or     valGANciclovir  450 mg Oral or NG Tube Every Other Day     vitamin D3  1,000 Units Oral  BID       Physical Exam:     Admit Weight: 104.3 kg (230 lb)    Current Vitals:   /66   Pulse 71   Temp 98  F (36.7  C) (Axillary)   Resp 16   Wt 102.2 kg (225 lb 5 oz)   SpO2 100%   BMI 41.21 kg/m      CVP (mmHg): 5 mmHg    Vital sign ranges:    Temp:  [97.7  F (36.5  C)-99  F (37.2  C)] 98  F (36.7  C)  Heart Rate:  [] 83  Resp:  [11-32] 16  BP: ()/() 105/66  FiO2 (%):  [40 %] 40 %  SpO2:  [96 %-100 %] 100 %  Patient Vitals for the past 24 hrs:   BP Temp Temp src Heart Rate Resp SpO2 Weight   08/24/19 0832 105/66 -- -- 83 -- 100 % --   08/24/19 0800 117/68 98  F (36.7  C) Axillary 89 16 100 % --   08/24/19 0730 109/71 -- -- 91 -- 100 % --   08/24/19 0700 107/73 -- -- 88 -- 100 % --   08/24/19 0600 (!) 104/96 -- -- 100 21 98 % --   08/24/19 0500 110/62 -- -- 87 24 99 % --   08/24/19 0400 103/71 98  F (36.7  C) Axillary 92 (!) 32 99 % 102.2 kg (225 lb 5 oz)   08/24/19 0300 104/66 -- -- 84 28 98 % --   08/24/19 0200 106/87 -- -- 91 24 100 % --   08/24/19 0100 109/70 -- -- 88 14 100 % --   08/24/19 0000 123/71 97.7  F (36.5  C) Axillary 89 11 100 % --   08/23/19 2300 117/68 -- -- 89 12 100 % --   08/23/19 2200 117/70 -- -- 88 30 100 % --   08/23/19 2130 (!) 130/113 -- -- 88 -- 100 % --   08/23/19 2100 113/67 -- -- 80 24 100 % --   08/23/19 2000 122/69 98.7  F (37.1  C) Axillary 82 27 99 % --   08/23/19 1900 118/68 -- -- 86 -- 100 % --   08/23/19 1800 107/73 99  F (37.2  C) Axillary 82 16 100 % --   08/23/19 1730 106/66 -- -- 83 -- 100 % --   08/23/19 1700 111/78 -- -- 86 -- 100 % --   08/23/19 1630 119/67 -- -- -- -- -- --   08/23/19 1600 103/63 99  F (37.2  C) Axillary 85 22 100 % --   08/23/19 1530 97/76 -- -- 94 -- 99 % --   08/23/19 1500 101/79 99  F (37.2  C) Axillary 98 22 98 % --   08/23/19 1455 (!) 110/90 -- -- 88 -- 98 % --   08/23/19 1430 90/60 -- -- 90 -- 98 % --   08/23/19 1400 (!) 110/90 99  F (37.2  C) Axillary 88 22 97 % --   08/23/19 1330 102/63 -- -- 91 -- 99 % --    08/23/19 1300 105/58 -- -- 93 -- 98 % --   08/23/19 1230 103/62 -- -- 93 -- 97 % --   08/23/19 1219 94/59 -- -- 91 -- -- --   08/23/19 1200 94/59 99  F (37.2  C) Axillary 87 26 99 % --   08/23/19 1130 102/62 -- -- 93 -- 100 % --   08/23/19 1100 93/79 -- -- 91 -- 96 % --   08/23/19 1030 119/64 -- -- 82 -- 99 % --   08/23/19 1000 99/57 98.3  F (36.8  C) Axillary 81 26 99 % --     General Appearance: intubated  HEENT: Right nare packed  Skin: normal, warm, scattered bruising  Heart: NSR  Lungs: intubated, CTA  Abdomen: soft, nondistended, incision sutured, c/d/i. JPs with serosanguinous output  : Incotn urine  Extremities: edema: present bilaterally. 3+.  Neurologic: Alert, follows commands    Frailty Scores     There is no flowsheet data to display.          Data:   CMP  Recent Labs   Lab 08/24/19  0401 08/23/19  2211  08/19/19  0330  08/17/19  2341    142   < > 137   < > 129*   POTASSIUM 4.3 4.1   < > 4.2   < > 4.5   CHLORIDE 109 110*   < > 105   < > 97   CO2 26 28   < > 18*   < > 23   * 94   < > 173*   < > 91   BUN 21 22   < > 27   < > 45*   CR 0.83 0.80   < > 1.33*   < > 2.04*   GFRESTIMATED 77 81   < > 44*   < > 26*   GFRESTBLACK 89 >90   < > 51*   < > 30*   JACOB 7.9* 7.8*   < > 9.3   < > 8.4*   ICAW 4.7 4.7   < >  --    < >  --    MAG 2.4* 2.5*   < > 2.2   < > 2.5*   PHOS 3.5 3.5   < > 4.4   < > 3.8   AMYLASE  --   --   --  326*  --  45   LIPASE  --   --   --  2,509*  --   --    ALBUMIN 2.7* 2.7*   < > 2.3*   < > 3.4   BILITOTAL 4.3* 4.4*   < > 8.9*   < > 26.9*   ALKPHOS 110 110   < > 46   < > 87   AST 59* 73*   < > 342*   < > 75*   * 194*   < > 238*   < > 34    < > = values in this interval not displayed.     CBC  Recent Labs   Lab 08/24/19  0401 08/23/19  2211  08/18/19  2100   HGB 8.0* 7.8*   < > 12.0   WBC 6.8 6.2   < > 8.4   PLT 48* 44*   < > 142*   A1C  --   --   --  5.0    < > = values in this interval not displayed.     COAGS  Recent Labs   Lab 08/24/19  0401 08/23/19  1021   08/18/19  2100  08/18/19  1758   INR 1.25* 1.24*   < > 1.88*   < > 2.79*   PTT  --   --   --  42*  --  103*    < > = values in this interval not displayed.

## 2019-08-24 NOTE — PLAN OF CARE
Problem: Renal Function Impairment (Acute Kidney Injury/Impairment)  Goal: Effective Renal Function  Outcome: Improving   D. Dialyzer clotting- blood returned via emil machine , emil therapy D / C - will transition to hemo - ( monitoring labs)   A stable -  I cont to monitor

## 2019-08-24 NOTE — PROGRESS NOTES
Patient suctioned and electively extubated per physician order. Placed on Oxyplus Mask @ 3 LPM,  Patient tolerated procedure well without any immediate complications.    Shelby Calderon, RT, RT  8/24/2019 3:44 PM

## 2019-08-24 NOTE — PROGRESS NOTES
Pt placed on CPAP+PS at 0600.    CPAP of 5 + PS of 15 was needed to achieve adequate minute ventilation.    Pt initially struggled with adequate tidal volumes until the PS was increased.    Pt seems to be deconditioned and anticipate it will take a number of CPAP+PS trials to reduce the PS.

## 2019-08-25 ENCOUNTER — APPOINTMENT (OUTPATIENT)
Dept: GENERAL RADIOLOGY | Facility: CLINIC | Age: 59
DRG: 005 | End: 2019-08-25
Attending: PEDIATRICS
Payer: COMMERCIAL

## 2019-08-25 ENCOUNTER — APPOINTMENT (OUTPATIENT)
Dept: OCCUPATIONAL THERAPY | Facility: CLINIC | Age: 59
DRG: 005 | End: 2019-08-25
Payer: COMMERCIAL

## 2019-08-25 ENCOUNTER — APPOINTMENT (OUTPATIENT)
Dept: GENERAL RADIOLOGY | Facility: CLINIC | Age: 59
DRG: 005 | End: 2019-08-25
Attending: OBSTETRICS & GYNECOLOGY
Payer: COMMERCIAL

## 2019-08-25 LAB
ALBUMIN SERPL-MCNC: 2.4 G/DL (ref 3.4–5)
ALP SERPL-CCNC: 112 U/L (ref 40–150)
ALT SERPL W P-5'-P-CCNC: 122 U/L (ref 0–50)
ANION GAP SERPL CALCULATED.3IONS-SCNC: 5 MMOL/L (ref 3–14)
AST SERPL W P-5'-P-CCNC: 37 U/L (ref 0–45)
BACTERIA SPEC CULT: NORMAL
BACTERIA SPEC CULT: NORMAL
BASE EXCESS BLDV CALC-SCNC: 1.6 MMOL/L
BASOPHILS # BLD AUTO: 0 10E9/L (ref 0–0.2)
BASOPHILS NFR BLD AUTO: 0 %
BILIRUB DIRECT SERPL-MCNC: 2.7 MG/DL (ref 0–0.2)
BILIRUB SERPL-MCNC: 3.5 MG/DL (ref 0.2–1.3)
BUN SERPL-MCNC: 25 MG/DL (ref 7–30)
CALCIUM SERPL-MCNC: 8.2 MG/DL (ref 8.5–10.1)
CHLORIDE SERPL-SCNC: 110 MMOL/L (ref 94–109)
CO2 SERPL-SCNC: 28 MMOL/L (ref 20–32)
CREAT SERPL-MCNC: 1.13 MG/DL (ref 0.52–1.04)
DIFFERENTIAL METHOD BLD: ABNORMAL
EOSINOPHIL # BLD AUTO: 0.3 10E9/L (ref 0–0.7)
EOSINOPHIL NFR BLD AUTO: 3.3 %
ERYTHROCYTE [DISTWIDTH] IN BLOOD BY AUTOMATED COUNT: 19.3 % (ref 10–15)
GFR SERPL CREATININE-BSD FRML MDRD: 53 ML/MIN/{1.73_M2}
GLUCOSE BLDC GLUCOMTR-MCNC: 70 MG/DL (ref 70–99)
GLUCOSE BLDC GLUCOMTR-MCNC: 80 MG/DL (ref 70–99)
GLUCOSE BLDC GLUCOMTR-MCNC: 81 MG/DL (ref 70–99)
GLUCOSE BLDC GLUCOMTR-MCNC: 81 MG/DL (ref 70–99)
GLUCOSE BLDC GLUCOMTR-MCNC: 84 MG/DL (ref 70–99)
GLUCOSE BLDC GLUCOMTR-MCNC: 91 MG/DL (ref 70–99)
GLUCOSE SERPL-MCNC: 91 MG/DL (ref 70–99)
HCO3 BLDV-SCNC: 28 MMOL/L (ref 21–28)
HCT VFR BLD AUTO: 27.3 % (ref 35–47)
HGB BLD-MCNC: 8 G/DL (ref 11.7–15.7)
IMM GRANULOCYTES # BLD: 0.2 10E9/L (ref 0–0.4)
IMM GRANULOCYTES NFR BLD: 2.4 %
INR PPP: 1.19 (ref 0.86–1.14)
LYMPHOCYTES # BLD AUTO: 0.3 10E9/L (ref 0.8–5.3)
LYMPHOCYTES NFR BLD AUTO: 3.6 %
Lab: NORMAL
MAGNESIUM SERPL-MCNC: 2.6 MG/DL (ref 1.6–2.3)
MCH RBC QN AUTO: 30 PG (ref 26.5–33)
MCHC RBC AUTO-ENTMCNC: 29.3 G/DL (ref 31.5–36.5)
MCV RBC AUTO: 102 FL (ref 78–100)
MONOCYTES # BLD AUTO: 0.9 10E9/L (ref 0–1.3)
MONOCYTES NFR BLD AUTO: 10.6 %
NEUTROPHILS # BLD AUTO: 7.1 10E9/L (ref 1.6–8.3)
NEUTROPHILS NFR BLD AUTO: 80.1 %
NRBC # BLD AUTO: 0 10*3/UL
NRBC BLD AUTO-RTO: 0 /100
O2/TOTAL GAS SETTING VFR VENT: ABNORMAL %
PCO2 BLDV: 55 MM HG (ref 40–50)
PH BLDV: 7.32 PH (ref 7.32–7.43)
PHOSPHATE SERPL-MCNC: 5.1 MG/DL (ref 2.5–4.5)
PLATELET # BLD AUTO: 47 10E9/L (ref 150–450)
PO2 BLDV: 35 MM HG (ref 25–47)
POTASSIUM SERPL-SCNC: 4.6 MMOL/L (ref 3.4–5.3)
PROT SERPL-MCNC: 4.7 G/DL (ref 6.8–8.8)
RBC # BLD AUTO: 2.67 10E12/L (ref 3.8–5.2)
SODIUM SERPL-SCNC: 142 MMOL/L (ref 133–144)
SPECIMEN SOURCE: NORMAL
SPECIMEN SOURCE: NORMAL
TACROLIMUS BLD-MCNC: 5.2 UG/L (ref 5–15)
TME LAST DOSE: NORMAL H
WBC # BLD AUTO: 8.8 10E9/L (ref 4–11)

## 2019-08-25 PROCEDURE — 25000132 ZZH RX MED GY IP 250 OP 250 PS 637: Performed by: INTERNAL MEDICINE

## 2019-08-25 PROCEDURE — 74018 RADEX ABDOMEN 1 VIEW: CPT

## 2019-08-25 PROCEDURE — 25000132 ZZH RX MED GY IP 250 OP 250 PS 637: Performed by: STUDENT IN AN ORGANIZED HEALTH CARE EDUCATION/TRAINING PROGRAM

## 2019-08-25 PROCEDURE — 84100 ASSAY OF PHOSPHORUS: CPT | Performed by: STUDENT IN AN ORGANIZED HEALTH CARE EDUCATION/TRAINING PROGRAM

## 2019-08-25 PROCEDURE — 27210429 ZZH NUTRITION PRODUCT INTERMEDIATE LITER

## 2019-08-25 PROCEDURE — 25000132 ZZH RX MED GY IP 250 OP 250 PS 637: Performed by: NURSE PRACTITIONER

## 2019-08-25 PROCEDURE — 97535 SELF CARE MNGMENT TRAINING: CPT | Mod: GO | Performed by: OCCUPATIONAL THERAPIST

## 2019-08-25 PROCEDURE — 25000132 ZZH RX MED GY IP 250 OP 250 PS 637: Performed by: SURGERY

## 2019-08-25 PROCEDURE — 40000986 XR ABDOMEN PORT 1 VW

## 2019-08-25 PROCEDURE — 80197 ASSAY OF TACROLIMUS: CPT | Performed by: PHYSICIAN ASSISTANT

## 2019-08-25 PROCEDURE — 25000128 H RX IP 250 OP 636

## 2019-08-25 PROCEDURE — 97140 MANUAL THERAPY 1/> REGIONS: CPT | Mod: GO

## 2019-08-25 PROCEDURE — 82803 BLOOD GASES ANY COMBINATION: CPT | Performed by: OBSTETRICS & GYNECOLOGY

## 2019-08-25 PROCEDURE — 25000131 ZZH RX MED GY IP 250 OP 636 PS 637: Performed by: NURSE PRACTITIONER

## 2019-08-25 PROCEDURE — 25000128 H RX IP 250 OP 636: Performed by: STUDENT IN AN ORGANIZED HEALTH CARE EDUCATION/TRAINING PROGRAM

## 2019-08-25 PROCEDURE — 93010 ELECTROCARDIOGRAM REPORT: CPT | Performed by: INTERNAL MEDICINE

## 2019-08-25 PROCEDURE — 25800025 ZZH RX 258: Performed by: SURGERY

## 2019-08-25 PROCEDURE — 25800030 ZZH RX IP 258 OP 636: Performed by: PHYSICIAN ASSISTANT

## 2019-08-25 PROCEDURE — 99233 SBSQ HOSP IP/OBS HIGH 50: CPT | Performed by: NURSE PRACTITIONER

## 2019-08-25 PROCEDURE — 85025 COMPLETE CBC W/AUTO DIFF WBC: CPT | Performed by: STUDENT IN AN ORGANIZED HEALTH CARE EDUCATION/TRAINING PROGRAM

## 2019-08-25 PROCEDURE — 82248 BILIRUBIN DIRECT: CPT | Performed by: STUDENT IN AN ORGANIZED HEALTH CARE EDUCATION/TRAINING PROGRAM

## 2019-08-25 PROCEDURE — 20000004 ZZH R&B ICU UMMC

## 2019-08-25 PROCEDURE — 00000146 ZZHCL STATISTIC GLUCOSE BY METER IP

## 2019-08-25 PROCEDURE — 25000125 ZZHC RX 250

## 2019-08-25 PROCEDURE — 80053 COMPREHEN METABOLIC PANEL: CPT | Performed by: STUDENT IN AN ORGANIZED HEALTH CARE EDUCATION/TRAINING PROGRAM

## 2019-08-25 PROCEDURE — 85610 PROTHROMBIN TIME: CPT | Performed by: STUDENT IN AN ORGANIZED HEALTH CARE EDUCATION/TRAINING PROGRAM

## 2019-08-25 PROCEDURE — 83735 ASSAY OF MAGNESIUM: CPT | Performed by: STUDENT IN AN ORGANIZED HEALTH CARE EDUCATION/TRAINING PROGRAM

## 2019-08-25 PROCEDURE — 25000131 ZZH RX MED GY IP 250 OP 636 PS 637: Performed by: PHYSICIAN ASSISTANT

## 2019-08-25 PROCEDURE — 25000128 H RX IP 250 OP 636: Performed by: NURSE PRACTITIONER

## 2019-08-25 PROCEDURE — 97110 THERAPEUTIC EXERCISES: CPT | Mod: GO | Performed by: OCCUPATIONAL THERAPIST

## 2019-08-25 PROCEDURE — 93005 ELECTROCARDIOGRAM TRACING: CPT

## 2019-08-25 PROCEDURE — 97530 THERAPEUTIC ACTIVITIES: CPT | Mod: GO | Performed by: OCCUPATIONAL THERAPIST

## 2019-08-25 PROCEDURE — 25000128 H RX IP 250 OP 636: Performed by: PHYSICIAN ASSISTANT

## 2019-08-25 RX ORDER — HYDROMORPHONE HCL/0.9% NACL/PF 0.2MG/0.2
.2-.4 SYRINGE (ML) INTRAVENOUS
Status: DISCONTINUED | OUTPATIENT
Start: 2019-08-25 | End: 2019-08-27

## 2019-08-25 RX ORDER — OXYMETAZOLINE HYDROCHLORIDE 0.05 G/100ML
2 SPRAY NASAL 2 TIMES DAILY
Status: DISCONTINUED | OUTPATIENT
Start: 2019-08-25 | End: 2019-08-29

## 2019-08-25 RX ORDER — BUMETANIDE 0.25 MG/ML
2 INJECTION INTRAMUSCULAR; INTRAVENOUS ONCE
Status: COMPLETED | OUTPATIENT
Start: 2019-08-25 | End: 2019-08-25

## 2019-08-25 RX ORDER — METOLAZONE 5 MG/1
5 TABLET ORAL ONCE
Status: COMPLETED | OUTPATIENT
Start: 2019-08-25 | End: 2019-08-25

## 2019-08-25 RX ORDER — HYDROMORPHONE HYDROCHLORIDE 1 MG/ML
0.3 INJECTION, SOLUTION INTRAMUSCULAR; INTRAVENOUS; SUBCUTANEOUS ONCE
Status: COMPLETED | OUTPATIENT
Start: 2019-08-25 | End: 2019-08-25

## 2019-08-25 RX ADMIN — Medication 2 DROP: at 01:56

## 2019-08-25 RX ADMIN — Medication 2 DROP: at 16:28

## 2019-08-25 RX ADMIN — Medication 4 MG: at 20:14

## 2019-08-25 RX ADMIN — MYCOPHENOLATE MOFETIL 750 MG: 200 POWDER, FOR SUSPENSION ORAL at 09:15

## 2019-08-25 RX ADMIN — Medication 1 PACKET: at 14:44

## 2019-08-25 RX ADMIN — MELATONIN 1000 UNITS: at 20:40

## 2019-08-25 RX ADMIN — DEXTROSE AND SODIUM CHLORIDE: 5; 450 INJECTION, SOLUTION INTRAVENOUS at 22:13

## 2019-08-25 RX ADMIN — Medication 2 DROP: at 09:17

## 2019-08-25 RX ADMIN — SALINE NASAL SPRAY 1 SPRAY: 1.5 SOLUTION NASAL at 12:31

## 2019-08-25 RX ADMIN — Medication 100 MG: at 12:31

## 2019-08-25 RX ADMIN — HYDROMORPHONE HYDROCHLORIDE 0.5 MG: 1 INJECTION, SOLUTION INTRAMUSCULAR; INTRAVENOUS; SUBCUTANEOUS at 07:38

## 2019-08-25 RX ADMIN — METOLAZONE 5 MG: 5 TABLET ORAL at 14:43

## 2019-08-25 RX ADMIN — HYDROMORPHONE HYDROCHLORIDE 0.5 MG: 1 INJECTION, SOLUTION INTRAMUSCULAR; INTRAVENOUS; SUBCUTANEOUS at 02:06

## 2019-08-25 RX ADMIN — OXYCODONE HYDROCHLORIDE 5 MG: 5 TABLET ORAL at 20:40

## 2019-08-25 RX ADMIN — BUMETANIDE 2 MG: 0.25 INJECTION INTRAMUSCULAR; INTRAVENOUS at 10:30

## 2019-08-25 RX ADMIN — SALINE NASAL SPRAY 1 SPRAY: 1.5 SOLUTION NASAL at 03:02

## 2019-08-25 RX ADMIN — MULTIVITAMIN 15 ML: LIQUID ORAL at 09:14

## 2019-08-25 RX ADMIN — PREDNISONE 5 MG: 5 SOLUTION ORAL at 09:15

## 2019-08-25 RX ADMIN — SALINE NASAL SPRAY 1 SPRAY: 1.5 SOLUTION NASAL at 16:29

## 2019-08-25 RX ADMIN — MELATONIN 1000 UNITS: at 09:14

## 2019-08-25 RX ADMIN — HYDROMORPHONE HYDROCHLORIDE 0.3 MG: 1 INJECTION, SOLUTION INTRAMUSCULAR; INTRAVENOUS; SUBCUTANEOUS at 15:58

## 2019-08-25 RX ADMIN — ASPIRIN 325 MG ORAL TABLET 325 MG: 325 PILL ORAL at 09:14

## 2019-08-25 RX ADMIN — Medication 1 PACKET: at 09:16

## 2019-08-25 RX ADMIN — SALINE NASAL SPRAY 1 SPRAY: 1.5 SOLUTION NASAL at 09:18

## 2019-08-25 RX ADMIN — LEVOTHYROXINE SODIUM 125 MCG: 0.12 TABLET ORAL at 09:14

## 2019-08-25 RX ADMIN — Medication 0.2 MG: at 22:48

## 2019-08-25 RX ADMIN — MYCOPHENOLATE MOFETIL 750 MG: 250 CAPSULE ORAL at 20:38

## 2019-08-25 RX ADMIN — SENNOSIDES AND DOCUSATE SODIUM 2 TABLET: 8.6; 5 TABLET ORAL at 09:14

## 2019-08-25 RX ADMIN — Medication 3 MG: at 09:16

## 2019-08-25 RX ADMIN — MICAFUNGIN SODIUM 100 MG: 10 INJECTION, POWDER, LYOPHILIZED, FOR SOLUTION INTRAVENOUS at 14:21

## 2019-08-25 RX ADMIN — DEXTROSE AND SODIUM CHLORIDE: 5; 450 INJECTION, SOLUTION INTRAVENOUS at 00:26

## 2019-08-25 RX ADMIN — FAMOTIDINE 20 MG: 20 TABLET ORAL at 09:14

## 2019-08-25 ASSESSMENT — PAIN DESCRIPTION - DESCRIPTORS
DESCRIPTORS: PRESSURE
DESCRIPTORS: PATIENT UNABLE TO DESCRIBE
DESCRIPTORS: PATIENT UNABLE TO DESCRIBE

## 2019-08-25 ASSESSMENT — ACTIVITIES OF DAILY LIVING (ADL)
ADLS_ACUITY_SCORE: 24
ADLS_ACUITY_SCORE: 26
ADLS_ACUITY_SCORE: 24
ADLS_ACUITY_SCORE: 26
ADLS_ACUITY_SCORE: 26
ADLS_ACUITY_SCORE: 24

## 2019-08-25 NOTE — PROGRESS NOTES
Pt awoke confused, agitated, yelling out, pulled off all monitoring devices and pulled out NGT along with nasal packing, combative towards staff. Soft mitts applied, re-orientation provided, 0.5 mg Diludid given for pain. Will continue to monitor.     Geneva Hassan RN on 8/25/2019 at 2:11 AM

## 2019-08-25 NOTE — PROGRESS NOTES
Immunosuppression Note:    Nicci Pemberton is a 59 year old female who is seen today  for immunosuppression management     I, Mandeep Alford MD, I have examined the patient with the resident/PA/Fellow, discussed and agree with the note and findings.  I have reviewed today's vital signs, medications, labs and imaging. I reviewed the immunosuppression medications and levels. 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 Tbili remains 3.5, stable. Drain output remains elevated  Immunosuppression management:  mg BID, Tac dose currently 3 mg BID,  Levels pending today. Continue pred 5mg daily until tac level therapeutic. Complexity of management: Medium. Contributing factors: anemia and induction  Hematology: Acute blood loss anemia. Transfusion goal hgb > 7 at this time. Hgb stable at this time. Last transfused 3 units 8/20 overnight.  HEENT: Post-op nasal bleeding, examination revealed no direct lesion, overall friable mucosa. Re-packed 8/21, ENT plans to leave packing in for 5 days. Re-bleeding with feeding tube placement 8/22, ENT re-packed around tube. Now stable.  Cardiorespiratory: Patient extubated yesterday  Hx of hypertension on spironolactone and bumex at home, will hold off at this time.  GI/Nutrition: NJ placement attempted 8/22, is currently distal gastric placement, unable to be advanced further during placement due to kinking and nasal bleeding. Stop TFs now that patient is extubated. Will obtain swallow eval when patient is less agitated   Endocrine: Steroid induced hyperglycemia, resolving, on sliding scale  insulin.  Fluid/Electrolytes: GAMAL- CRRT stopped patient will get HD Making some urine.  : Incontinent urine x1  Infectious disease: Ppx with micafungin x 10 days, zosyn completed.  Prophylaxis: DVT, fall, GI, fungal  Disposition: 4A, appreciate critical cares per SICU team    Medical Decision Making: Medium  Subsequent visit 33438 (moderate level decision making)    VEENA/Fellow/Resident Provider: Renee Allen MD Fellow [id number]    Faculty: Mandeep Alford M.D.    __________________________________________________________________  Transplant History: Admitted 8/16/2019 for acute decompensated ANGULO.   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    8/18/2019 (Liver), Postoperative day: 7     Interval History: Unable to obtain a history from the patient due to critical condition, intubation   Pt anxious and agitated. BMs. Pain controlled. Alert to self only    ROS:   A 10-point review of systems was negative except as noted above.    Curent Meds:    aspirin  325 mg Oral Daily     carboxymethylcellulose PF  2 drop Both Eyes Q6H     famotidine  20 mg Oral Daily     levothyroxine  125 mcg Oral Daily     micafungin  100 mg Intravenous Q24H     multivitamins w/minerals  15 mL Per Feeding Tube Daily     mycophenolate  750 mg Oral BID IS    Or     mycophenolate  750 mg Oral or NG Tube BID IS     polyethylene glycol  17 g Oral BID     predniSONE  5 mg Per Feeding Tube Daily     protein modular  1 packet Per Feeding Tube TID     senna-docusate  2 tablet Oral BID     sodium chloride  1 spray Both Nostrils Q4H     sodium chloride (PF)  3 mL Intravenous Q8H     sodium chloride (PF)  3 mL Intracatheter Q8H     tacrolimus  3 mg Per Feeding Tube BID IS     [START ON 8/26/2019] valGANciclovir  450 mg Oral Once per day on Mon Fri    Or     [START ON 8/26/2019] valGANciclovir  450 mg Oral or NG Tube Once per day on Mon Fri     vitamin D3  1,000 Units Oral BID       Physical Exam:     Admit Weight:  104.3 kg (230 lb)    Current Vitals:   BP 98/65   Pulse 96   Temp 99.1  F (37.3  C) (Axillary)   Resp 10   Wt 101.2 kg (223 lb 1.7 oz)   SpO2 97%   BMI 40.81 kg/m      CVP (mmHg): 5 mmHg    Vital sign ranges:    Temp:  [96.4  F (35.8  C)-99.1  F (37.3  C)] 99.1  F (37.3  C)  Pulse:  [90-96] 96  Heart Rate:  [] 91  Resp:  [10-23] 10  BP: ()/() 98/65  FiO2 (%):  [4 %-40 %] 4 %  SpO2:  [94 %-100 %] 97 %  Patient Vitals for the past 24 hrs:   BP Temp Temp src Pulse Heart Rate Resp SpO2 Weight   08/25/19 0800 98/65 99.1  F (37.3  C) Axillary -- 91 10 97 % --   08/25/19 0700 114/67 -- -- -- 94 13 97 % --   08/25/19 0600 98/70 -- -- -- 91 18 95 % --   08/25/19 0530 -- -- -- -- -- -- -- 101.2 kg (223 lb 1.7 oz)   08/25/19 0500 104/64 -- -- -- 88 12 94 % --   08/25/19 0400 106/63 96.4  F (35.8  C) Axillary -- 92 13 96 % --   08/25/19 0300 100/65 -- -- -- 89 17 97 % --   08/25/19 0200 (!) 122/100 -- -- -- 95 18 97 % --   08/25/19 0100 111/68 -- -- -- 91 14 95 % --   08/25/19 0000 98/65 98  F (36.7  C) Axillary -- 94 19 95 % --   08/24/19 2300 116/72 -- -- -- 101 21 96 % --   08/24/19 2200 107/62 -- -- -- 99 16 98 % --   08/24/19 2100 102/56 -- -- -- 93 10 99 % --   08/24/19 2030 102/58 -- -- -- 91 16 99 % --   08/24/19 2000 119/71 98.6  F (37  C) Axillary -- 97 14 99 % --   08/24/19 1930 124/78 -- -- 96 94 15 99 % --   08/24/19 1900 102/72 -- -- 90 90 12 -- --   08/24/19 1800 109/67 -- -- -- 90 23 100 % --   08/24/19 1700 112/71 98.6  F (37  C) Axillary -- 92 22 100 % --   08/24/19 1630 113/73 -- -- -- 96 -- 95 % --   08/24/19 1600 115/70 98.4  F (36.9  C) Axillary -- 94 22 100 % --   08/24/19 1530 116/69 -- -- -- 94 -- 100 % --   08/24/19 1500 116/75 -- -- -- 88 -- 99 % --   08/24/19 1430 104/62 -- -- -- 79 -- 97 % --   08/24/19 1400 108/61 -- -- -- 79 10 99 % --   08/24/19 1330 114/58 -- -- -- 81 -- 98 % --   08/24/19 1300 (!) 89/78 -- -- -- 88 -- 98 % --   08/24/19 1250 -- -- -- -- 83 -- 95 % --    08/24/19 1230 107/55 -- -- -- 85 -- 98 % --   08/24/19 1200 91/68 99  F (37.2  C) Axillary -- 96 18 97 % --   08/24/19 1130 100/66 -- -- -- 84 -- 100 % --   08/24/19 1100 99/62 -- -- -- 82 -- 100 % --   08/24/19 1030 106/67 -- -- -- 89 -- 98 % --   08/24/19 1000 106/65 -- -- -- 91 -- 98 % --   08/24/19 0930 (!) 110/95 -- -- -- 92 -- 98 % --     General Appearance: agitated  HEENT: Right nare packed  Skin: normal, warm, scattered bruising  Heart: NSR  Lungs: CTAB  Abdomen: soft, nondistended, incision sutured, c/d/i. JPs with serosanguinous output  : Incont urine  Extremities: edema: present bilaterally. 3+.  Neurologic: Alert, follows commands    Frailty Scores     There is no flowsheet data to display.          Data:   CMP  Recent Labs   Lab 08/25/19  0258 08/24/19  1556 08/24/19  1456 08/24/19  1016 08/24/19  1015  08/19/19  0330    141  --  140  --    < > 137   POTASSIUM 4.6 4.5  --  4.3  --    < > 4.2   CHLORIDE 110* 110*  --  109  --    < > 105   CO2 28 28  --  25  --    < > 18*   GLC 91 114*  --  121*  --    < > 173*   BUN 25 22  --  19  --    < > 27   CR 1.13* 0.87  --  0.79  --    < > 1.33*   GFRESTIMATED 53* 73  --  82  --    < > 44*   GFRESTBLACK 62 84  --  >90  --    < > 51*   JACOB 8.2* 7.7*  --  7.9*  --    < > 9.3   ICAW  --   --  4.8  --  4.5   < >  --    MAG 2.6*  --   --  2.4*  --    < > 2.2   PHOS 5.1* 4.2  --  3.3  --    < > 4.4   AMYLASE  --   --   --   --   --   --  326*   LIPASE  --   --   --   --   --   --  2,509*   ALBUMIN 2.4* 2.4*  --  2.6*  --    < > 2.3*   BILITOTAL 3.5* 3.5*  --  4.5*  --    < > 8.9*   ALKPHOS 112 116  --  115  --    < > 46   AST 37 48*  --  51*  --    < > 342*   * 136*  --  151*  --    < > 238*    < > = values in this interval not displayed.     CBC  Recent Labs   Lab 08/25/19  0258 08/24/19  1556  08/18/19  2100   HGB 8.0* 7.6*   < > 12.0   WBC 8.8 6.3   < > 8.4   PLT 47* 41*   < > 142*   A1C  --   --   --  5.0    < > = values in this interval not  displayed.     KHADRA  Recent Labs   Lab 08/25/19  0258 08/24/19  0401  08/18/19  2100  08/18/19  1758   INR 1.19* 1.25*   < > 1.88*   < > 2.79*   PTT  --   --   --  42*  --  103*    < > = values in this interval not displayed.

## 2019-08-25 NOTE — PLAN OF CARE
D/I:?Patient on unit 4A Surgical/Neuro ICU   Neuro- AxO X1.Moves all extremities. Progressively more anxious/ restless as day progressed. Has periods where everything hurts and other stimuli frustrate and then under a minute later denies pain or previous stimuli bothering. Impulsive, removed NJ feeding tube @1630  CV-  NSR, HR 80s-90s. BPs 100s/ 50s  Pulm- 3 L oxymask, sats mid 90s.  GI- NJ placed by ENT @ 0730, removed by patient @1630 despite mitts for confusion. No BM  - Purewick- over 1.5 L of urine through diuretics   Gtts- TKO, if ENT does not place new NJ by 1830 will restart D5 1/2 normal drip  Skin- Bruised, ecchymotic, waxy, +4 edems in legs and arms- weepy   IV's/Drains- L dialysis catheter, R internal jugular, L PIV, bilateral OBED drains  Pain- Dilaudid given X2. Denies pain and then 2 minutes later everything hurts and patient cries out and then the cycle repeats  See flow sheets for further interventions and assessments.   A: Stable   P:?Continue to monitor pt closely. Notify MD of significant changes.

## 2019-08-25 NOTE — PLAN OF CARE
POD #7 DDLT     Neuro: oriented to self only, confused, agitated, tearful, pulling lines, responds well to 0.5 mg Dilaudid, RAT SAUER  CV: SR with occasional PAC's and PVC's , BP stable, afebrile  Pulm: oxymask at 3 L, continue pulm hygiene, productive cough  Gi: NPO, pt removed NGT, low rate IVF started for BG control, no BM this shift, soft/nontender obese abdomen with hypoactive bowel sounds   Gu:oliguric, purewick in place 150 ml genaro urine output  Skin: clamshell incision, bilateral abdominal OBED drains, +4 generalized edema, nasal packing removed with NGT, no new bleeding identified, diffuse generalized bruising, left thigh blister, perineal excoriation  Lines: Left internal jugular Dialysis catheter, Right TL internal jugular, PIVx1    Geneva Hassan RN on 8/25/2019 at 4:19 AM

## 2019-08-25 NOTE — PROGRESS NOTES
Nephrology Progress Note  2019         Assessment & Recommendations:   Nicci Pemberton is a 59 year old year old female with PMHx of ESLD secondary to ANGULO, A1AT and iron overload, complicated by non-oliguric GAMAL, HE and SBP, HTN, hypothyroidism who was admitted on  for acute decompensated cirrhosis with GAMAL and underwent  donor liver transplant on  and did have CRRT initiated intra-op. Renal is following for volume and RRT management.  1. GAMAL - stopped CRRT yesterday as circuit was clotting and there was no further indication for continuous dialysis.  She is non-oliguric without diuretic.    - no indication for HD today    2. Hypervolemia - She remains hypervolemic but has ongoing drain output, 2 liters yesterday  - agree with diuretic, metolazone today.  She is allergic to loop diuretic.  Could consider ethacrynic acid    3. Hyperphosphatemia - phos 5.1, mild elevation, monitor,  No indication for phos binders    4. Anemia - due to blood loss, renal failure, liver failure etc.  Hemoglobin stable at 8, management per primary team    Recommendations were communicated to Sharon Loaiza NP for transplant surgery    Shari Goetz MD  [facility]  Department of Medicine  Division of Renal Disease and Hypertension  [phone]   Interval History :   Nursing and provider notes from last 24 hours reviewed.  In the last 24 hours Nicci Pemberton had CRRT discontinued due to increase UOP and no indication for continuous dialysis.  She is encephalopathic - ICU delirium per nursing, her mother reports to me that her current behavior is very unlike her.  She is sitting in chair and complaining that she cannot have her legs down.  She complains also of being hot.  She is anxious and worried that I am there to bring her somewhere.  She cannot provide history or ROS  Review of Systems:   Not done due to delirium    Physical Exam:   I/O last 3 completed shifts:  In: 953.33  [I.V.:278.33; NG/GT:180]  Out: 2978 [Urine:700; Drains:1825; Other:453]   /75   Pulse 96   Temp 99.1  F (37.3  C) (Axillary)   Resp 11   Wt 101.2 kg (223 lb 1.7 oz)   SpO2 98%   BMI 40.81 kg/m       GENERAL APPEARANCE: anxious,   EYES:  tr scleral icterus, pupils equal  HENT: mouth without ulcers or lesions  PULM: lungs clear to auscultation bilaterally in upper fields but crackles at left base,   CV: regular rhythm, normal rate, no rub     -JVD not seen     -edema 2+ of legs   INTEGUMENT: no cyanosis  NEURO:  encephalopathic  Access temp HD catheter left IJ    Labs:   All labs reviewed by me  Electrolytes/Renal -   Recent Labs   Lab Test 08/25/19 0258 08/24/19  1556 08/24/19  1016 08/24/19  0401    141 140 141   POTASSIUM 4.6 4.5 4.3 4.3   CHLORIDE 110* 110* 109 109   CO2 28 28 25 26   BUN 25 22 19 21   CR 1.13* 0.87 0.79 0.83   GLC 91 114* 121* 108*   JACOB 8.2* 7.7* 7.9* 7.9*   MAG 2.6*  --  2.4* 2.4*   PHOS 5.1* 4.2 3.3 3.5       CBC -   Recent Labs   Lab Test 08/25/19  0258 08/24/19  1556 08/24/19  1016   WBC 8.8 6.3 7.7   HGB 8.0* 7.6* 7.9*   PLT 47* 41* 50*       LFTs -   Recent Labs   Lab Test 08/25/19  0258 08/24/19  1556 08/24/19  1016   ALKPHOS 112 116 115   BILITOTAL 3.5* 3.5* 4.5*   * 136* 151*   AST 37 48* 51*   PROTTOTAL 4.7* 4.6* 4.9*   ALBUMIN 2.4* 2.4* 2.6*       Iron Panel -   Recent Labs   Lab Test 02/19/19  0825 06/18/18  1024   IRON 188* 220*   IRONSAT 95* 90*   DREW 825* 996*         Imaging:  -     Current Medications:    aspirin  325 mg Oral Daily     carboxymethylcellulose PF  2 drop Both Eyes Q6H     dapsone  100 mg Oral Daily     famotidine  20 mg Oral Daily     levothyroxine  125 mcg Oral Daily     micafungin  100 mg Intravenous Q24H     multivitamins w/minerals  15 mL Per Feeding Tube Daily     mycophenolate  750 mg Oral BID IS    Or     mycophenolate  750 mg Oral or NG Tube BID IS     polyethylene glycol  17 g Oral BID     predniSONE  5 mg Per Feeding Tube Daily      protein modular  1 packet Per Feeding Tube TID     senna-docusate  2 tablet Oral BID     sodium chloride  1 spray Both Nostrils Q4H     sodium chloride (PF)  3 mL Intravenous Q8H     sodium chloride (PF)  3 mL Intracatheter Q8H     tacrolimus  4 mg Per Feeding Tube BID IS     [START ON 8/26/2019] valGANciclovir  450 mg Oral Once per day on Mon Fri    Or     [START ON 8/26/2019] valGANciclovir  450 mg Oral or NG Tube Once per day on Mon Fri     vitamin D3  1,000 Units Oral BID       IV fluid REPLACEMENT ONLY       IV fluid REPLACEMENT ONLY       dextrose 5% and 0.45% NaCl 50 mL/hr at 08/25/19 1200     BETA BLOCKER NOT PRESCRIBED       Shari Goetz MD

## 2019-08-25 NOTE — PROVIDER NOTIFICATION
Patient removed NJ @1230. MD notified, ENT to replace tube. Mitts for confusion were in place, patient self removed mitts and then removed NJ

## 2019-08-25 NOTE — PLAN OF CARE
Discharge Planner OT   4A  Patient plan for discharge: TCU  Current status: Pt now extubated although continues to be delirious and anxious. Pt needing much encouragement and mitts for safety.  Facilitated up to chair via lift for participation in AAROM and oral care with mod A.    Barriers to return to prior living situation: anxiety, significant deconditioning and cognitive deficits.  Delirium  new precautions  Recommendations for discharge: TCU  Rationale for recommendations: Pt will need much rehab to improve strength and IND with ADLs and transfers within precautions.        Entered by: Virgie Wooten 08/25/2019 12:58 PM

## 2019-08-25 NOTE — PROGRESS NOTES
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 Skin jaundiced with purplish/maroon discoloration. Skin intact aside from x 1 half dollar sized blister on medial aspect of L thigh, intact. Toenails yellowed.   ROM   Range of Motion (WFL) other (describe)   Description of Range of Motion Deficits Limited due to increased fluid   Strength   Strength (WFL) other (describe)   Description of Strength Deficits Generalized weakness present throughout   Sensation   Sensation (WFL) no deficits were identified   Assessment/Plan   Patient presents with Edema   Assessment Pt presenting with acut bairon chronic edema impacting ease/independence with ADLs/IADLs   Assessment of Occupational Performance 1-3 Performance Deficits   Identified Performance Deficits LB dressing, mobility   Clinical Decision Making (Complexity) Low complexity   Planned Edema Interventions Gradient compression bandaging;Fit for compression garment;Exercises;Precautions to prevent infection/exacerbation;Education;Manual therapy    Treatment Frequency 5x/week   Treatment Duration 2 weeks   Patient, Family and/or Staff in agreement with plan of care. Yes   Risks and benefits of treatment have been explained. Yes   Total Evaluation Time   Total Evaluation Time (Minutes) 6

## 2019-08-25 NOTE — PLAN OF CARE
Edema/4A: Previously holding due to intubation, pt with significant change in edema, re-assessment completed, treatment initiated. Pt presenting with soft 2+ pitting throughout with 2+/3+ extending into thighs. Skin with jaundiced appearance with maroon/purplish discoloration due to increased fluid. Pt with x 1 ~half dollar sized blister, intact on medial aspect of L thigh.Skin cares performed with sween 24 from MTPs to knee creases for protection of skin integrity. Appropriate to initiate compression for increased fluid mobilization to promote ease with functional mobility. Okay to wear x 24 hours until therapist returns. Please remove compression if causing numbness, tingling, pain, or becomes soiled.

## 2019-08-25 NOTE — PROGRESS NOTES
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     CHANGES TODAY:  - NGT  - Bumex 1 dose  - Resume TF   - metanebs + mucomyst  - TTF  -Transfer to floor     Neurological  # Acute postoperative pain  # hx of hepatic encephalopathy  - Monitor neurological status. Delirium preventions and precautions.   - Pain: prn dilaudid, prn oxycodone, no acetaminophen        Pulmonary  # Acute respiratory failure, resolved   -Supplemental o2 as needed   - Aggressive pulmonary hygiene  -mucomyst and metanebs ordered       Cardiovascular  # Shock - hypovolemic/hemorrhagic, resolved  - Monitor hemodynamic status  - MAPs >65. Off pressors  - Dobutamine stress test 6/19/2019 negative for ischemia     # hx of HTN  - PTA spironolactone 100 mg BID, bumetanide 1 mg   - Will give 2mg Bumex today and monitor response.      Gastroenterology/Nutrition  # ESLD 2/2 Alpha-1-antitrypsin and ANGULO s/p DDLT 8/18/19    # heterozygous (MZ genotype) for alpha-1-antitrypsin with secondary iron overload  # hyperbilirubinemia  # umbilical hernia  - MELD at transplant: 39  - most recent EGD 5/7/2019 without esophageal or gastric varices  - bowel regimen: Miralax, Senna  - JPs x 2 with 2L out in 24 hours.       # Protein calorie deficit malnutrition   - No indication for parenteral nutrition.  - Tube feeds at 40 ml/hr via gastric feeding tube.   - PTA famotidine 20 mg BID     Renal/Fluid/Electrolytes  # acute kidney injury, baseline Cr ~0.6  # history of hepatorenal syndrome  - Monitor intake and output.  - Off CRRT, no plans for IHD at this time.  - Nephrology following.       Endocrine  # Stress and steroid hyperglycemia- resolved  - Goal to keep BG< 180 for optimal wound healing   - discontinue insulin. Continue daily glucose checks.      # hx of  hypothyroidism  - PTA levothyroxine 125 mcg daily     ID  # Immunosuppression  - Continue micafungin x 7 days (last dose 8/24/2019)  - Continue ppx: valcyte (end date 8/28/2019)  - continue immunosuppression: methylprednisolone, mycophenolate, tacrolimus (initiated 8/19/2019)  - T max 99.9. CXR clear, f/u blood clx     # Hx of SBP  - Rifaximin discontinued (8/19/2019)     Heme  # Acute blood loss anemia, EBL 5L  # thrombocytopenia  # coagulopathy  - Transfuse if hgb <7.0 or signs/symptoms of hypoperfusion  - Goal INR <2, Fibrinogen >200, plt >30K  - type and screen q72h  - hold DDAVP  - Aspirin 325mg     Musculoskeletal  # weakness and deconditioning of critical illness   # osteoarthritis  - Physical and occupational therapy consult when extubated     General Cares/Prophylaxis  DVT Prophylaxis: Pneumatic Compression Devices  GI Prophylaxis: pepcid BID   Restraints: No   Lines/ tubes/ drains:  - L abd JPx1  - R abd OBED x1  - R internal jugular CVC  - L internal jugular dialysis catheter  - PIVx2  Code: Full  Disposition: Floor     Patient seen, findings and plan discussed with surgical ICU staff, Dr. Allen.         Hugo Aviles, ROBERTO            ====================================    SUBJECTIVE:   No acute events.     OBJECTIVE:   1. VITAL SIGNS:   Temp:  [96.4  F (35.8  C)-99.1  F (37.3  C)] 99.1  F (37.3  C)  Pulse:  [90-96] 96  Heart Rate:  [] 91  Resp:  [10-23] 10  BP: ()/() 98/65  FiO2 (%):  [4 %-40 %] 4 %  SpO2:  [94 %-100 %] 97 %  Ventilation Mode: (S) CPAP/PS  (Continuous positive airway pressure with Pressure Support)  FiO2 (%): 4 %  PEEP (cm H2O): 5 cmH2O  Pressure Support (cm H2O): (S) 10 cmH2O  Oxygen Concentration (%): 40 %  Resp: 10      2. INTAKE/ OUTPUT:   I/O last 3 completed shifts:  In: 953.33 [I.V.:278.33; NG/GT:180]  Out: 2978 [Urine:700; Drains:1825; Other:453]    3. PHYSICAL EXAMINATION:   General: Awake, alert  Neuro: A&Ox3, NAD, no focal deficits noted.   Resp: Breathing  non-labored, course crackles throughout  CV: RRR, S1, S2 heard   Abdomen: Soft, Non-distended, passing gas   Extremities: warm and well perfused    4. INVESTIGATIONS:   Arterial Blood Gases   Recent Labs   Lab 08/23/19  0439 08/22/19  1549 08/22/19  1309 08/22/19  1035   PH 7.48* 7.40 7.31* 7.33*   PCO2 36 41 53* 48*   PO2 72* 111* 84 83   HCO3 26 26 27 25     Complete Blood Count   Recent Labs   Lab 08/25/19  0258 08/24/19  1556 08/24/19  1016 08/24/19  0401   WBC 8.8 6.3 7.7 6.8   HGB 8.0* 7.6* 7.9* 8.0*   PLT 47* 41* 50* 48*     Basic Metabolic Panel  Recent Labs   Lab 08/25/19  0258 08/24/19  1556 08/24/19  1016 08/24/19  0401    141 140 141   POTASSIUM 4.6 4.5 4.3 4.3   CHLORIDE 110* 110* 109 109   CO2 28 28 25 26   BUN 25 22 19 21   CR 1.13* 0.87 0.79 0.83   GLC 91 114* 121* 108*     Liver Function Tests  Recent Labs   Lab 08/25/19  0258 08/24/19  1556 08/24/19  1016 08/24/19  0401  08/23/19  1021 08/23/19  0440   AST 37 48* 51* 59*   < > 96* 120*   * 136* 151* 168*   < > 235* 269*   ALKPHOS 112 116 115 110   < > 108 116   BILITOTAL 3.5* 3.5* 4.5* 4.3*   < > 4.6* 4.7*   ALBUMIN 2.4* 2.4* 2.6* 2.7*   < > 2.6* 2.4*   INR 1.19*  --   --  1.25*  --  1.24* 1.26*    < > = values in this interval not displayed.     Pancreatic Enzymes  Recent Labs   Lab 08/19/19  0330   LIPASE 2,509*   AMYLASE 326*     Coagulation Profile  Recent Labs   Lab 08/25/19  0258 08/24/19  0401 08/23/19  1021 08/23/19  0440  08/18/19  2100  08/18/19  1758  08/18/19  1605   INR 1.19* 1.25* 1.24* 1.26*   < > 1.88*   < > 2.79*   < > 3.38*   PTT  --   --   --   --   --  42*  --  103*  --  181*    < > = values in this interval not displayed.         5. RADIOLOGY:   Recent Results (from the past 24 hour(s))   XR Abdomen Port 1 View    Narrative    Exam: XR ABDOMEN PORT 1 VW 8/24/2019 4:37 PM    History: NG tube    Comparison: 8/22/2019     Findings/    Impression    impression:  1. Feeding tube tip projects over the gastric  antrum/proximal  duodenum. This could be confirmed with barium.  2. Nonspecific paucity of small bowel gas.  3. Right and left-sided surgical drains are in stable position.  Negative placed and continues to project over the right mid abdomen.    LYLA GARCIA MD       =========================================

## 2019-08-25 NOTE — PROGRESS NOTES
Brief Note    ENT was notified patient had pulled out NG tube yesterday once extubated. Has had no bleeding since. Is not able to take PO due to delirium and altered mental status.    NG tube was replaced without difficulty with no signs of epistaxis or oropharyngeal bleeding to 60 cm through the right nares.    - Packing previously placed on 8/22 is absorbable and does not require removal. Do not recommend attempting to remove or place any tubes/instrumentation through the nose at this time. No plans for removal of packing, this will dissolve on its own  - Would tolerated slow/small amounts of oozing from the nose over the next 24h given new NG placement. If bleeding becomes more brisk please contact ENT for repeat evaluation and consideration of  Repeated dissolvable packing  - Recommend continuing nasal saline spray to b/l nasal passages Q4H   - May use Afrin nasal spray PRN for bleeding only, do not schedule due to risk of rebound nasal congestion with overuse  - Remainder of care per ICU      Tristen Siegel PGY3 [facility]

## 2019-08-26 ENCOUNTER — APPOINTMENT (OUTPATIENT)
Dept: OCCUPATIONAL THERAPY | Facility: CLINIC | Age: 59
DRG: 005 | End: 2019-08-26
Payer: COMMERCIAL

## 2019-08-26 ENCOUNTER — APPOINTMENT (OUTPATIENT)
Dept: GENERAL RADIOLOGY | Facility: CLINIC | Age: 59
DRG: 005 | End: 2019-08-26
Attending: NURSE PRACTITIONER
Payer: COMMERCIAL

## 2019-08-26 ENCOUNTER — APPOINTMENT (OUTPATIENT)
Dept: PHYSICAL THERAPY | Facility: CLINIC | Age: 59
DRG: 005 | End: 2019-08-26
Payer: COMMERCIAL

## 2019-08-26 ENCOUNTER — APPOINTMENT (OUTPATIENT)
Dept: SPEECH THERAPY | Facility: CLINIC | Age: 59
DRG: 005 | End: 2019-08-26
Attending: NURSE PRACTITIONER
Payer: COMMERCIAL

## 2019-08-26 DIAGNOSIS — Z94.4 LIVER REPLACED BY TRANSPLANT (H): Primary | ICD-10-CM

## 2019-08-26 LAB
ALBUMIN SERPL-MCNC: 2.1 G/DL (ref 3.4–5)
ALP SERPL-CCNC: 113 U/L (ref 40–150)
ALT SERPL W P-5'-P-CCNC: 88 U/L (ref 0–50)
ANION GAP SERPL CALCULATED.3IONS-SCNC: 5 MMOL/L (ref 3–14)
AST SERPL W P-5'-P-CCNC: 28 U/L (ref 0–45)
BASOPHILS # BLD AUTO: 0 10E9/L (ref 0–0.2)
BASOPHILS NFR BLD AUTO: 0 %
BILIRUB DIRECT SERPL-MCNC: 2 MG/DL (ref 0–0.2)
BILIRUB SERPL-MCNC: 2.8 MG/DL (ref 0.2–1.3)
BUN SERPL-MCNC: 32 MG/DL (ref 7–30)
CALCIUM SERPL-MCNC: 8.3 MG/DL (ref 8.5–10.1)
CHLORIDE SERPL-SCNC: 108 MMOL/L (ref 94–109)
CO2 SERPL-SCNC: 29 MMOL/L (ref 20–32)
CREAT SERPL-MCNC: 1.39 MG/DL (ref 0.52–1.04)
DIFFERENTIAL METHOD BLD: ABNORMAL
EOSINOPHIL # BLD AUTO: 0.2 10E9/L (ref 0–0.7)
EOSINOPHIL NFR BLD AUTO: 3 %
ERYTHROCYTE [DISTWIDTH] IN BLOOD BY AUTOMATED COUNT: 19 % (ref 10–15)
GFR SERPL CREATININE-BSD FRML MDRD: 41 ML/MIN/{1.73_M2}
GLUCOSE BLDC GLUCOMTR-MCNC: 77 MG/DL (ref 70–99)
GLUCOSE BLDC GLUCOMTR-MCNC: 79 MG/DL (ref 70–99)
GLUCOSE SERPL-MCNC: 79 MG/DL (ref 70–99)
HCT VFR BLD AUTO: 26.4 % (ref 35–47)
HGB BLD-MCNC: 7.9 G/DL (ref 11.7–15.7)
IMM GRANULOCYTES # BLD: 0.1 10E9/L (ref 0–0.4)
IMM GRANULOCYTES NFR BLD: 1.8 %
INTERPRETATION ECG - MUSE: NORMAL
LYMPHOCYTES # BLD AUTO: 0.3 10E9/L (ref 0.8–5.3)
LYMPHOCYTES NFR BLD AUTO: 3.9 %
MAGNESIUM SERPL-MCNC: 2.3 MG/DL (ref 1.6–2.3)
MCH RBC QN AUTO: 30.6 PG (ref 26.5–33)
MCHC RBC AUTO-ENTMCNC: 29.9 G/DL (ref 31.5–36.5)
MCV RBC AUTO: 102 FL (ref 78–100)
MONOCYTES # BLD AUTO: 0.7 10E9/L (ref 0–1.3)
MONOCYTES NFR BLD AUTO: 9.2 %
NEUTROPHILS # BLD AUTO: 6.1 10E9/L (ref 1.6–8.3)
NEUTROPHILS NFR BLD AUTO: 82.1 %
NRBC # BLD AUTO: 0 10*3/UL
NRBC BLD AUTO-RTO: 0 /100
PHOSPHATE SERPL-MCNC: 5 MG/DL (ref 2.5–4.5)
PLATELET # BLD AUTO: 52 10E9/L (ref 150–450)
POTASSIUM SERPL-SCNC: 4.4 MMOL/L (ref 3.4–5.3)
PROT SERPL-MCNC: 4.3 G/DL (ref 6.8–8.8)
RBC # BLD AUTO: 2.58 10E12/L (ref 3.8–5.2)
SODIUM SERPL-SCNC: 142 MMOL/L (ref 133–144)
WBC # BLD AUTO: 7.4 10E9/L (ref 4–11)

## 2019-08-26 PROCEDURE — 25000128 H RX IP 250 OP 636: Performed by: PHYSICIAN ASSISTANT

## 2019-08-26 PROCEDURE — 25000125 ZZHC RX 250

## 2019-08-26 PROCEDURE — 97535 SELF CARE MNGMENT TRAINING: CPT | Mod: GO | Performed by: OCCUPATIONAL THERAPIST

## 2019-08-26 PROCEDURE — 25000132 ZZH RX MED GY IP 250 OP 250 PS 637: Performed by: PHYSICIAN ASSISTANT

## 2019-08-26 PROCEDURE — 25000131 ZZH RX MED GY IP 250 OP 636 PS 637: Performed by: NURSE PRACTITIONER

## 2019-08-26 PROCEDURE — 25000128 H RX IP 250 OP 636: Performed by: STUDENT IN AN ORGANIZED HEALTH CARE EDUCATION/TRAINING PROGRAM

## 2019-08-26 PROCEDURE — 99233 SBSQ HOSP IP/OBS HIGH 50: CPT | Performed by: NURSE PRACTITIONER

## 2019-08-26 PROCEDURE — 80053 COMPREHEN METABOLIC PANEL: CPT | Performed by: STUDENT IN AN ORGANIZED HEALTH CARE EDUCATION/TRAINING PROGRAM

## 2019-08-26 PROCEDURE — 92610 EVALUATE SWALLOWING FUNCTION: CPT | Mod: GN

## 2019-08-26 PROCEDURE — 97110 THERAPEUTIC EXERCISES: CPT | Mod: GP

## 2019-08-26 PROCEDURE — 12000026 ZZH R&B TRANSPLANT

## 2019-08-26 PROCEDURE — 85025 COMPLETE CBC W/AUTO DIFF WBC: CPT | Performed by: STUDENT IN AN ORGANIZED HEALTH CARE EDUCATION/TRAINING PROGRAM

## 2019-08-26 PROCEDURE — 84100 ASSAY OF PHOSPHORUS: CPT | Performed by: STUDENT IN AN ORGANIZED HEALTH CARE EDUCATION/TRAINING PROGRAM

## 2019-08-26 PROCEDURE — 83735 ASSAY OF MAGNESIUM: CPT | Performed by: STUDENT IN AN ORGANIZED HEALTH CARE EDUCATION/TRAINING PROGRAM

## 2019-08-26 PROCEDURE — 97530 THERAPEUTIC ACTIVITIES: CPT | Mod: GP

## 2019-08-26 PROCEDURE — 97161 PT EVAL LOW COMPLEX 20 MIN: CPT | Mod: GP

## 2019-08-26 PROCEDURE — 25000132 ZZH RX MED GY IP 250 OP 250 PS 637: Performed by: INTERNAL MEDICINE

## 2019-08-26 PROCEDURE — 97530 THERAPEUTIC ACTIVITIES: CPT | Mod: GO | Performed by: OCCUPATIONAL THERAPIST

## 2019-08-26 PROCEDURE — 25000131 ZZH RX MED GY IP 250 OP 636 PS 637: Performed by: PHYSICIAN ASSISTANT

## 2019-08-26 PROCEDURE — 40000986 XR ABDOMEN PORT 1 VW

## 2019-08-26 PROCEDURE — 25000132 ZZH RX MED GY IP 250 OP 250 PS 637: Performed by: STUDENT IN AN ORGANIZED HEALTH CARE EDUCATION/TRAINING PROGRAM

## 2019-08-26 PROCEDURE — 82248 BILIRUBIN DIRECT: CPT | Performed by: STUDENT IN AN ORGANIZED HEALTH CARE EDUCATION/TRAINING PROGRAM

## 2019-08-26 PROCEDURE — 25000132 ZZH RX MED GY IP 250 OP 250 PS 637: Performed by: NURSE PRACTITIONER

## 2019-08-26 PROCEDURE — 25000132 ZZH RX MED GY IP 250 OP 250 PS 637: Performed by: SURGERY

## 2019-08-26 PROCEDURE — 25800030 ZZH RX IP 258 OP 636: Performed by: PHYSICIAN ASSISTANT

## 2019-08-26 PROCEDURE — 97140 MANUAL THERAPY 1/> REGIONS: CPT | Mod: GO

## 2019-08-26 PROCEDURE — 00000146 ZZHCL STATISTIC GLUCOSE BY METER IP

## 2019-08-26 PROCEDURE — 25800025 ZZH RX 258: Performed by: PHYSICIAN ASSISTANT

## 2019-08-26 RX ADMIN — Medication 2 DROP: at 07:41

## 2019-08-26 RX ADMIN — SALINE NASAL SPRAY 1 SPRAY: 1.5 SOLUTION NASAL at 23:47

## 2019-08-26 RX ADMIN — Medication 2 DROP: at 14:18

## 2019-08-26 RX ADMIN — FAMOTIDINE 20 MG: 20 TABLET ORAL at 07:40

## 2019-08-26 RX ADMIN — Medication 4 MG: at 19:32

## 2019-08-26 RX ADMIN — MULTIVITAMIN 15 ML: LIQUID ORAL at 07:40

## 2019-08-26 RX ADMIN — DEXTROSE AND SODIUM CHLORIDE: 5; 450 INJECTION, SOLUTION INTRAVENOUS at 18:08

## 2019-08-26 RX ADMIN — MELATONIN 1000 UNITS: at 19:32

## 2019-08-26 RX ADMIN — Medication 0.2 MG: at 23:43

## 2019-08-26 RX ADMIN — ASPIRIN 325 MG ORAL TABLET 325 MG: 325 PILL ORAL at 07:40

## 2019-08-26 RX ADMIN — SALINE NASAL SPRAY 1 SPRAY: 1.5 SOLUTION NASAL at 13:25

## 2019-08-26 RX ADMIN — MICAFUNGIN SODIUM 100 MG: 10 INJECTION, POWDER, LYOPHILIZED, FOR SOLUTION INTRAVENOUS at 13:24

## 2019-08-26 RX ADMIN — SALINE NASAL SPRAY 1 SPRAY: 1.5 SOLUTION NASAL at 19:31

## 2019-08-26 RX ADMIN — MELATONIN 1000 UNITS: at 07:40

## 2019-08-26 RX ADMIN — POLYETHYLENE GLYCOL 3350 17 G: 17 POWDER, FOR SOLUTION ORAL at 19:32

## 2019-08-26 RX ADMIN — Medication 0.2 MG: at 08:18

## 2019-08-26 RX ADMIN — OXYMETAZOLINE HYDROCHLORIDE 2 SPRAY: 0.05 SPRAY NASAL at 07:42

## 2019-08-26 RX ADMIN — OXYCODONE HYDROCHLORIDE 5 MG: 5 TABLET ORAL at 13:24

## 2019-08-26 RX ADMIN — PREDNISONE 5 MG: 5 SOLUTION ORAL at 07:43

## 2019-08-26 RX ADMIN — MYCOPHENOLATE MOFETIL 750 MG: 200 POWDER, FOR SUSPENSION ORAL at 19:31

## 2019-08-26 RX ADMIN — VALGANCICLOVIR 450 MG: 450 TABLET, FILM COATED ORAL at 10:42

## 2019-08-26 RX ADMIN — LEVOTHYROXINE SODIUM 125 MCG: 0.12 TABLET ORAL at 07:40

## 2019-08-26 RX ADMIN — Medication 0.2 MG: at 03:40

## 2019-08-26 RX ADMIN — MYCOPHENOLATE MOFETIL 750 MG: 250 CAPSULE ORAL at 07:40

## 2019-08-26 RX ADMIN — SALINE NASAL SPRAY 1 SPRAY: 1.5 SOLUTION NASAL at 07:43

## 2019-08-26 RX ADMIN — Medication 100 MG: at 07:41

## 2019-08-26 RX ADMIN — OXYCODONE HYDROCHLORIDE 5 MG: 5 TABLET ORAL at 06:33

## 2019-08-26 RX ADMIN — Medication 1 PACKET: at 19:31

## 2019-08-26 RX ADMIN — Medication 0.4 MG: at 00:31

## 2019-08-26 RX ADMIN — Medication 4 MG: at 07:41

## 2019-08-26 ASSESSMENT — ACTIVITIES OF DAILY LIVING (ADL)
ADLS_ACUITY_SCORE: 24

## 2019-08-26 NOTE — PLAN OF CARE
Discharge Planner SLP   Patient plan for discharge: unknown  Current status: Clinical swallow evaluation completed per MD order. Pt demonstrates generalized weakness, but presents with functional oral-pharyngeal swallow on exam. Mastication/bolus manipulation adequate and no s/sx of aspiration observed. Recommend regular diet with thin liquids as tolerated. Pt to sit upright for all PO intake, take small bites/sips and alternate consistencies. No ongoing ST needs indicated at this time. Will sign off  Barriers to return to prior living situation: medical status; generalized weakness  Recommendations for discharge: will defer to PT/OT  Rationale for recommendations: No ongoing ST needs indicated at this time.       Entered by: Alma Rosa Rios 08/26/2019 2:08 PM

## 2019-08-26 NOTE — PROGRESS NOTES
Transplant Surgery  Inpatient Daily Progress Note  08/26/2019    Assessment & Plan: Nicci Pemberton is a 60 yo female with a past medical history significant for end stage liver disease 2/2 ANGULO and alpha 1 anti-trypsin deficiency now s/p DD OLT on 8/18/19    Graft function: Good, AST and ALT now trending down. Tbili 2.8 (3.5). R drain output decreasing, L drain output still high  Immunosuppression management:  mg BID, Tac dose currently 4 mg BID, levels ordered every 48 hours. Continue pred 5mg daily until tac level therapeutic. Complexity of management: Medium. Contributing factors: anemia and induction  Hematology: Acute blood loss anemia. Transfusion goal hgb > 7 at this time. Hgb stable at this time. Last transfused 3 units 8/20 overnight.  HEENT: Post-op nasal bleeding, examination revealed no direct lesion, overall friable mucosa. Re-packed 8/21, ENT plans to leave packing in for 5 days. Re-bleeding with feeding tube placement 8/22, ENT re-packed around tube. Now stable.  Cardiorespiratory: Patient extubated 8/24  Hx of hypertension on spironolactone and bumex at home, will hold off at this time.  GI/Nutrition: NJ placement attempted 8/22, is currently gastric placement, unable to be advanced further during placement due to kinking and nasal bleeding. Pt passed swallow eval today, will order diet, assess intake for nutritional needs and if feeding tube migrates.  Endocrine: Steroid induced hyperglycemia, resolving, on sliding scale insulin.  Fluid/Electrolytes: GAMAL- CRRT stopped patient will get HD as needed, so far no acute indication. Good urine output  : 2500 mL of urine output yesterday plus Incontinent urine x3  Infectious disease: Ppx with micafungin x 10 days, zosyn completed.  Prophylaxis: DVT, fall, GI, fungal  Disposition: 4A, will transfer to  today    Medical Decision Making: Medium  Subsequent visit 97608 (moderate level decision making)    VEENA/Fellow/Resident Provider: Mary Braxton  ELENA    Faculty: Hui Cross M.D.    __________________________________________________________________  Transplant History: Admitted 8/16/2019 for acute decompensated ANGULO.   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    8/18/2019 (Liver), Postoperative day: 8     Interval History:   Pt anxious and agitated. BMs. Pain controlled. Alert to self, time    ROS:   A 10-point review of systems was negative except as noted above.    Curent Meds:    aspirin  325 mg Oral Daily     carboxymethylcellulose PF  2 drop Both Eyes Q6H     dapsone  100 mg Oral Daily     famotidine  20 mg Oral Daily     hemostatic matrix  1 kit Other Once     levothyroxine  125 mcg Oral Daily     micafungin  100 mg Intravenous Q24H     multivitamins w/minerals  15 mL Per Feeding Tube Daily     mycophenolate  750 mg Oral BID IS    Or     mycophenolate  750 mg Oral or NG Tube BID IS     oxidized cellulose   Topical Once     oxymetazoline  2 spray Both Nostrils BID     polyethylene glycol  17 g Oral BID     predniSONE  5 mg Per Feeding Tube Daily     protein modular  1 packet Per Feeding Tube TID     senna-docusate  2 tablet Oral BID     sodium chloride  1 spray Both Nostrils Q4H     sodium chloride (PF)  3 mL Intravenous Q8H     sodium chloride (PF)  3 mL Intracatheter Q8H     tacrolimus  4 mg Per Feeding Tube BID IS     valGANciclovir  450 mg Oral Once per day on Mon Fri    Or     valGANciclovir  450 mg Oral or NG Tube Once per day on Mon Fri     vitamin D3  1,000 Units Oral BID       Physical Exam:     Admit Weight: 104.3 kg (230 lb)    Current Vitals:   /66   Pulse 96   Temp 98  F (36.7  C) (Axillary)   Resp 27   Wt 101.2 kg (223 lb 1.7 oz)   SpO2 93%   BMI 40.81 kg/m      CVP (mmHg): 5 mmHg    Vital sign ranges:    Temp:  [97.8  F (36.6  C)-98.9  F (37.2  C)] 98  F (36.7  C)  Heart Rate:  [82-96] 87  Resp:  [8-27] 27  BP: ()/(54-94) 108/66  SpO2:  [89 %-100 %] 93 %  Patient Vitals for the  past 24 hrs:   BP Temp Temp src Heart Rate Resp SpO2   08/26/19 1400 108/66 -- -- 87 27 93 %   08/26/19 1300 114/73 -- -- 82 10 96 %   08/26/19 1200 117/74 98  F (36.7  C) Axillary 89 17 96 %   08/26/19 1121 (!) 115/94 -- -- 89 -- 95 %   08/26/19 1100 (!) 115/94 -- -- 90 17 93 %   08/26/19 1000 123/76 -- -- 90 15 (!) 89 %   08/26/19 0900 120/73 -- -- 85 20 97 %   08/26/19 0800 126/63 98.8  F (37.1  C) Axillary 91 19 100 %   08/26/19 0700 101/64 -- -- 88 15 95 %   08/26/19 0600 95/60 -- -- 88 14 97 %   08/26/19 0500 104/61 -- -- 88 12 97 %   08/26/19 0400 99/54 98.2  F (36.8  C) Axillary 87 12 94 %   08/26/19 0300 96/59 -- -- 86 9 97 %   08/26/19 0200 97/67 -- -- 86 8 97 %   08/26/19 0100 99/65 -- -- 88 10 97 %   08/26/19 0000 113/64 98.2  F (36.8  C) Axillary 90 14 96 %   08/25/19 2300 104/63 -- -- 91 16 96 %   08/25/19 2200 99/76 97.8  F (36.6  C) Axillary 90 20 96 %   08/25/19 2100 110/69 -- -- 88 19 98 %   08/25/19 2045 -- -- -- 90 19 98 %   08/25/19 2000 105/64 -- -- 92 20 94 %   08/25/19 1900 98/60 -- -- 94 12 94 %   08/25/19 1800 104/67 -- -- 95 17 92 %   08/25/19 1700 103/58 -- -- 88 13 97 %   08/25/19 1600 (!) 89/63 98.9  F (37.2  C) Axillary 96 10 96 %     General Appearance: agitated  HEENT: Feeding tube in nare  Skin: normal, warm, scattered bruising  Heart: NSR  Lungs: CTAB  Abdomen: soft, nondistended, incision sutured, c/d/i. JPs with serosanguinous output  : External catheter placed  Extremities: edema: present bilaterally. 3+.  Neurologic: Alert, follows commands    Frailty Scores     There is no flowsheet data to display.          Data:   CMP  Recent Labs   Lab 08/26/19  0305 08/25/19  0258  08/24/19  1456  08/24/19  1015    142   < >  --    < >  --    POTASSIUM 4.4 4.6   < >  --    < >  --    CHLORIDE 108 110*   < >  --    < >  --    CO2 29 28   < >  --    < >  --    GLC 79 91   < >  --    < >  --    BUN 32* 25   < >  --    < >  --    CR 1.39* 1.13*   < >  --    < >  --    GFRESTIMATED 41*  53*   < >  --    < >  --    GFRESTBLACK 48* 62   < >  --    < >  --    JACOB 8.3* 8.2*   < >  --    < >  --    ICAW  --   --   --  4.8  --  4.5   MAG 2.3 2.6*  --   --    < >  --    PHOS 5.0* 5.1*   < >  --    < >  --    ALBUMIN 2.1* 2.4*   < >  --    < >  --    BILITOTAL 2.8* 3.5*   < >  --    < >  --    ALKPHOS 113 112   < >  --    < >  --    AST 28 37   < >  --    < >  --    ALT 88* 122*   < >  --    < >  --     < > = values in this interval not displayed.     CBC  Recent Labs   Lab 08/26/19  0305 08/25/19 0258   HGB 7.9* 8.0*   WBC 7.4 8.8   PLT 52* 47*     COAGS  Recent Labs   Lab 08/25/19  0258 08/24/19  0401   INR 1.19* 1.25*

## 2019-08-26 NOTE — PROVIDER NOTIFICATION
ENT and SICU notified of inability to flush FT, RN attempted to reposition, remains either kinked past where can be seen or clogged. Will leave current tube in place overnight for potential rewire, advancement, or further manipulation. 80 cm right nare. Pt alert and oriented at 2000 assessment, passed bedside swallow, SICU ok'd for oral meds. After receiving HS meds, pt c/o of chest/epigastric pressure, EKG obtained, pain subsided without intervention.     Geneva Hassan RN on 8/25/2019 at 10:42 PM

## 2019-08-26 NOTE — PROGRESS NOTES
"Brief Nephrology Note    Mrs Pemberton had CRRT stopped yesterday, Cr only up slightly and made 2.5L of UOP without diuretic yesterday.  Will hold on pulling HD line for one more day to make sure Cr is stable but showing signs of recovery overall.  Has been on lasix/bumex in the past (despite \"sulfa\" allergy) so can use if needed for volume management but so far net negative on her own.      Forrest Potts   CNS, Nephrology  [phone]  "

## 2019-08-26 NOTE — PROGRESS NOTES
08/26/19 1300   Quick Adds   Type of Visit Initial PT Evaluation   Living Environment   Lives With spouse   Living Arrangements house   Living Environment Comment Pt pt she lives with her spouse in a house, reporting she was previously IND and did not use any AD. Per chart pt was at TCU previously, Family was not present to further confirm/deny PLOF or living situation information   Self-Care   Usual Activity Tolerance moderate   Current Activity Tolerance poor   Regular Exercise Yes  (Has been at TCU working with therapies)   Functional Level Prior   Fall history within last six months no   Which of the above functional risks had a recent onset or change? ambulation;transferring;toileting;bathing;dressing;eating   Prior Functional Level Comment Pt reporting she did not use AD prior to admission, but unknown if pt was using walker at TCU or working towards IND ambulation, Pt reporting she was IND with everything at her home and did not need any assistance.    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 and alpha-1-antitrypsin deficiency c/b cirrhosis, SBP, lymphedema, anemia s/p DDLT on 8/18/19, with hypovolemic/hemorrhagic shock   Precautions/Limitations abdominal precautions   Cognitive Status Examination   Orientation person   Level of Consciousness alert;confused   Follows Commands and Answers Questions 75% of the time   Personal Safety and Judgment at risk behaviors demonstrated;impaired   Cognitive Comment Pt knew she was in hospital but not exactly which one, was not oriented to day, month, or season even when provided with options   Integumentary/Edema   Integumentary/Edema Comments BLE edema with lymphedema  "wraps on   Posture    Posture Forward head position;Protracted shoulders   Range of Motion (ROM)   ROM Comment BLE appear grossly WFL, UE not formally assessed but appear WFL   Strength   Strength Comments BUE and BLE demonstrates 2 to 3/5 grossly in BLE and BUE   Transfer Skills   Transfer Comments Sit <> stand with mod Ax2   Gait   Gait Comments Not appropriate to assess at this time   Balance   Balance Comments Mod A x 2 with static standing, min-mod A with EOB sitting   General Therapy Interventions   Planned Therapy Interventions balance training;bed mobility training;gait training;neuromuscular re-education;strengthening;transfer training;ROM;risk factor education;home program guidelines;progressive activity/exercise;stretching   Clinical Impression   Criteria for Skilled Therapeutic Intervention yes, treatment indicated   PT Diagnosis impaired functional mobility   Influenced by the following impairments strength, edema, activity tolerance, pain, cognition   Functional limitations due to impairments gait, stairs, transfers, bed mobility, functional endurance   Clinical Presentation Stable/Uncomplicated   Clinical Presentation Rationale S/p liver transplant, improving medical status, delirium/cognition, clinical reasoning   Clinical Decision Making (Complexity) Low complexity   Therapy Frequency 5x/week   Predicted Duration of Therapy Intervention (days/wks) 3 weeks   Anticipated Discharge Disposition Transitional Care Facility   Risk & Benefits of therapy have been explained Yes   Patient, Family & other staff in agreement with plan of care Yes   [facility] [employer]-PAC TM \"6 Clicks\"   2016, Trustees of [facility], under license to [employer].  All rights reserved.   6 Clicks Short Forms Basic Mobility Inpatient Short Form   [facility] AM-PAC  \"6 Clicks\" V.2 Basic Mobility Inpatient Short Form   1. Turning from your back to your side while in a flat bed without using bedrails? 2 - A Lot   2. " Moving from lying on your back to sitting on the side of a flat bed without using bedrails? 2 - A Lot   3. Moving to and from a bed to a chair (including a wheelchair)? 1 - Total   4. Standing up from a chair using your arms (e.g., wheelchair, or bedside chair)? 2 - A Lot   5. To walk in hospital room? 1 - Total   6. Climbing 3-5 steps with a railing? 1 - Total   Basic Mobility Raw Score (Score out of 24.Lower scores equate to lower levels of function) 9   Total Evaluation Time   Total Evaluation Time (Minutes) 6

## 2019-08-26 NOTE — PLAN OF CARE
Discharge Planner OT    4A   Patient plan for discharge: return to TCU  Current status: Pt continues to be quite anxious and confused, often asking what the plan is and where she is.  Caution with mitts off hands as per RN pt has pulled out feeding tube and pulled on drains.  Facilitated up to chair D x2 via lift for Mod A participation in ADLs.  Pt demonstrating decreased ROM strength and proprioception for task.  Barriers to return to prior living situation: cognitive, weakness, edema, ROM, proprioception and FMC deficits  Recommendations for discharge: TCU  Rationale for recommendations: Pt with new precautions, weakness, edema and medical needs, will benefit from skilled rehab to return to PLOF.       Entered by: Virgie Wooten 08/26/2019 11:34 AM

## 2019-08-26 NOTE — PLAN OF CARE
Edema/4A: Pt presenting with visual reductions distally, soft 2+ pitting in feet and ankles, soft 1+/2+ extending to knees with moderate edema in thighs. Skin cares performed prior to reapplication of GCB with sween 24 from MTPs to knee creases for continued protection of skin integrity. Okay to wear x 48 hours until therapist returns. Please remove compression if causing numbness, tingling, pain, or becomes soiled.

## 2019-08-26 NOTE — PROCEDURES
Small Bowel Feeding Tube Placement Assessment  Reason for Feeding Tube Placement: REPO of ppFT as pt pulled at feeding tube and then tube kinked in mouth/ throat area  Cortrak Start Time: 9:53   Cortrak End Time: 10:17  Medicine Delivered During Procedure:none- tube was already in place, just repositioned  Placement Successful: Presume gastric (pending AXR confirmation).   Procedure Complications: none  Final Placement Carlo at exit of nare 95 cm  Face to Face time with patient: 30 min      Peggy Calderon RD, MS, LD  SICU: [id number] *[id number]

## 2019-08-26 NOTE — PROGRESS NOTES
08/26/19 1401   General Information   Onset Date 08/16/19   Start of Care Date 08/26/19   Referring Physician yimi Alfaro, Lydia Savage CNP   Patient Profile Review/OT: Additional Occupational Profile Info See Profile for full history and prior level of function   Patient/Family Goals Statement Pt unable to state;  would like to make sure she can eat safely   Swallowing Evaluation Bedside swallow evaluation   Behaviorial Observations Lethargic;Confused  (pleasant and cooperative)   Mode of current nutrition NPO   Respiratory Status Room air   Comments Orders received for swallow evaluation. Pt w/ PMH of ESLD (MELD 39) 2/2 ANGULO and alpha-1-antitrypsin deficiency c/b cirrhosis, SBP, lymphedema, anemia s/p DDLT on 8/18/19, with hypovolemic/hemorrhagic shock.   Clinical Swallow Evaluation   Oral Musculature generally intact   Structural Abnormalities none present   Dentition present and adequate   Mucosal Quality good   Mandibular Strength and Mobility intact   Oral Labial Strength and Mobility WFL   Lingual Strength and Mobility WFL   Velar Elevation intact   Buccal Strength and Mobility intact   Laryngeal Function Cough;Throat clear;Swallow;Voicing initiated;Dry swallow palpated   Additional Documentation Yes   Swallow Eval   Feeding Assistance dependent   Clinical Swallow Eval: Thin Liquid Texture Trial   Mode of Presentation, Thin Liquids straw;fed by clinician   Volume of Liquid or Food Presented 6oz   Oral Phase of Swallow WFL   Pharyngeal Phase of Swallow intact  (no s/sx of aspiration observed)   Diagnostic Statement swallow functional for thin liquids on exam   Clinical Swallow Eval: Puree Solid Texture Trial   Mode of Presentation, Puree spoon;fed by clinician   Volume of Puree Presented tsp bites x3   Oral Phase, Puree WFL   Pharyngeal Phase, Puree intact  (no s/sx of aspiration observed)   Diagnostic Statement swallow functional for puree textures on exam   Clinical Swallow Eval: Semisolid  Texture Trial   Mode of Presentation, Semisolid spoon;fed by clinician   Volume of Semisolid Food Presented tsp bites x3   Oral Phase, Semisolid WFL   Pharyngeal Phase, Semisolid intact  (no s/sx of aspiration observed)   Diagnostic Statement swallow functional for puree textures   Clinical Swallow Eval: Solid Food Texture Trial   Mode of Presentation, Solid fed by clinician   Volume of Solid Food Presented bite of daisha cracker x2   Oral Phase, Solid WFL  (mastication/bolus manipulation adequate)   Pharyngeal Phase, Solid intact  (no s/sx of aspiration observed)   Diagnostic Statement swallow functional for solid textures on exam   Swallow Compensations   Swallow Compensations No compensations were used   Esophageal Phase of Swallow   Patient reports or presents with symptoms of esophageal dysphagia No   Swallow Eval: Clinical Impressions   Skilled Criteria for Therapy Intervention No problems identified which require skilled intervention   Functional Assessment Scale (FAS) 7   Treatment Diagnosis functional oral-pharyngeal swallow   Diet texture recommendations Regular diet;Thin liquids   Recommended Feeding/Eating Techniques alternate between small bites and sips of food/liquid;maintain upright posture during/after eating for 30 mins;small sips/bites   Demonstrates Need for Referral to Another Service occupational therapy;physical therapy;respiratory therapy;dietitian   Predicted Duration of Therapy Intervention (days/wks) evaluation only   Anticipated Discharge Disposition   (will defer to PT/OT; no ongoing ST needs at this time)   Risks and Benefits of Treatment have been explained. Yes   Patient, family and/or staff in agreement with Plan of Care Yes   Clinical Impression Comments Clinical swallow evaluation completed per MD order. Pt demonstrates generalized weakness, but presents with functional oral-pharyngeal swallow on exam. Mastication/bolus manipulation adequate and no s/sx of aspiration observed.  Recommend regular diet with thin liquids as tolerated. Pt to sit upright for all PO intake, take small bites/sips and alternate consistencies. No ongoing ST needs indicated at this time. Will sign off   Total Evaluation Time   Total Evaluation Time (Minutes) 15

## 2019-08-26 NOTE — PLAN OF CARE
POD #7 DDLT      Neuro:mentation continues to fluctuate, pt continues to have episodes of restlessness and agitation, requiring soft limb restraints and mitts. Does respond well to Dil[place] for pain. Continue to re-orient as appropriate.   CV: SR with occasional PAC's and PVC's , BP stable, afebrile  Pulm: oxymask at 3 L, continue pulm hygiene  Gi: NPO, NGT placed earlier in the evening either kinked or clogged, unable to flush, see provider notification. Low rate IVF restarted for BG control, no BM this shift, soft/nontender obese abdomen with hypoactive bowel sounds   Gu:purewick in place AUOP  Skin: clamshell incision, bilateral abdominal OBED drains, +4 generalized edema, bilateral lymphedema wraps, diffuse generalized bruising, left thigh blister popped, perineal excoriation  Lines: Left internal jugular Dialysis catheter, Right TL internal jugular, PIVx1    Geneva Hassan RN

## 2019-08-26 NOTE — PLAN OF CARE
4A - PT evaluation completed and treatment initiated.   Discharge Planner PT   Patient plan for discharge: did not specifically state, appears open to rehab if needed  Current status: Pt completed sit <> stand x 3 with mod A x2, knees blocked and increased cues at hips to facilitate forward weight shift, limited tolerance for standing. Pt dependently transferred to bed with liko lift and Ax2, not appropriate for standing transfers at this time.  Barriers to return to prior living situation: impaired functional mobility, medical status  Recommendations for discharge: TCU  Rationale for recommendations: Pt with deficits in strength, balance, activity tolerance, edema, pain, anxiety/cognition impacting overall functional mobility. Pt would benefit from continued therapy to address above deficits.       Entered by: Isela Olivas 08/26/2019 4:44 PM

## 2019-08-26 NOTE — PROGRESS NOTES
BRIEF ENT NOTE    Pt pulled out feeding tube. This was replaced under endoscopic guidance by myself to 67cm. There was crusting between the middle turbinate and the septum on the right side. It was advanced by nursing to 80 cm, however, it became coiled in the oropharynx and oral cavity. The tube was then backed out and then advanced. CXR will be repeated.    Sukhi Villarreal MD

## 2019-08-26 NOTE — PLAN OF CARE
D/I:?Patient on unit 4A Surgical/Neuro ICU   Neuro- AxO X3, disoriented to situation X1 and time X1.Moves all extremities. Has periods where everything hurts and other stimuli frustrate and then under a minute later denies pain or previous stimuli bothering. Restrained for being impulsive and removing feeding tube yesterday through mitts. In chair for 3 hours  CV-  NSR, HR 80s-90s. BPs 100s/ 50s  Pulm- 3 L NC, sats mid 90s.  GI- NG fixed by dietary and verified by xray. BM in AM, loose and brown  - Purewick- adequate UO  Gtts- TKO, d5 1/2 normal- 50  Skin- Bruised, ecchymotic, waxy, +4 edema in legs and arms- weepy   IV's/Drains- L dialysis catheter, R internal jugular, L PIV, bilateral OBED drains  Pain- Dilaudid given X1. Denies pain and then 2 minutes later everything hurts and patient cries out and then the cycle repeats  See flow sheets for further interventions and assessments.   A: Stable   P:?Continue to monitor pt closely. Notify MD of significant changes.

## 2019-08-26 NOTE — PROGRESS NOTES
SURGICAL ICU PROGRESS NOTE  August 26, 2019      ASSESSMENT: Nicci Pemberton 59F w/ PMH of ESLD (MELD 39) 2/2 ANGULO and alpha-1-antitrypsin deficiency c/b cirrhosis, SBP, lymphedema, anemia s/p DDLT on 8/18/19, with hypovolemic/hemorrhagic shock.     CHANGES TODAY:  - transfer to   - reposition feeding tube  - Swallow eval today     Neurological  # Acute postoperative pain  # hx of hepatic encephalopathy  - Monitor neurological status. Delirium preventions and precautions.   - Pain: prn dilaudid, prn oxycodone, no acetaminophen        Pulmonary  # Respiratory insufficiency  -Supplemental o2 as needed, wean as tolerated  - Aggressive pulmonary hygiene  -mucomyst and metanebs ordered       Cardiovascular  # Shock - hypovolemic/hemorrhagic, resolved  - Monitor hemodynamic status  - MAPs >65. Off pressors   - Dobutamine stress test 6/19/2019 negative for ischemia     # hx of HTN  - PTA spironolactone 100 mg BID, bumetanide 1 mg      Gastroenterology/Nutrition  # ESLD 2/2 Alpha-1-antitrypsin and ANGULO s/p DDLT 8/18/19    # heterozygous (MZ genotype) for alpha-1-antitrypsin with secondary iron overload  # hyperbilirubinemia  # umbilical hernia  - MELD at transplant: 39  - most recent EGD 5/7/2019 without esophageal or gastric varices  - bowel regimen: Miralax, Senna  - JPs x 2 with 2L out in 24 hours.       # Protein calorie deficit malnutrition   - No indication for parenteral nutrition.  - Pulled feeding tube out, replaced by ENT but now not functional. Replaced today by RD.   - Will obtain swallow eval and start diet.   - PTA famotidine 20 mg BID      Renal/Fluid/Electrolytes  # acute kidney injury, baseline Cr ~0.6  # history of hepatorenal syndrome  - Monitor intake and output.  - Off CRRT, no plans for IHD at this time.  - Nephrology following.    - Received metolazone x 1 yesterday with 2.5L of response.   - Creatinine back up 1.39. Will give diuretic holiday now and observe.       Endocrine  # Stress and  steroid hyperglycemia- resolved  - Goal to keep BG< 180 for optimal wound healing   - daily glucose checks.      # hx of hypothyroidism  - PTA levothyroxine 125 mcg daily     ID  # Immunosuppression  - Micafungin x 7 days (last dose 8/24/2019)  - Continue ppx: valcyte (end date 8/28/2019)  - continue immunosuppression: methylprednisolone, mycophenolate, tacrolimus (initiated 8/19/2019)  - T max 99.9. CXR clear, f/u blood clx     # Hx of SBP  - Rifaximin discontinued (8/19/2019)     Heme  # Acute blood loss anemia, EBL 5L  # thrombocytopenia  # coagulopathy  - Transfuse if hgb <7.0 or signs/symptoms of hypoperfusion  - Goal INR <2, Fibrinogen >200, plt >30K  - type and screen q72h  - hold DDAVP  - Aspirin 325mg     Musculoskeletal  # weakness and deconditioning of critical illness   # osteoarthritis  - Physical and occupational therapy consult      General Cares/Prophylaxis  DVT Prophylaxis: Pneumatic Compression Devices  GI Prophylaxis: pepcid BID   Restraints: No   Lines/ tubes/ drains:  - L abd JPx1  - R abd OBED x1  - R internal jugular CVC  - L internal jugular dialysis catheter  - PIVx2    Code: Full    Disposition: transfer to      Patient seen, findings and plan discussed with surgical ICU staff, Dr. Daniels.      Lydia De La Mater     ====================================    SUBJECTIVE:   No acute events. Pulled feeding tube out. Disoriented to year today otherwise able to answer all questions.     OBJECTIVE:   1. VITAL SIGNS:   Temp:  [97.8  F (36.6  C)-98.9  F (37.2  C)] 98.8  F (37.1  C)  Heart Rate:  [85-97] 85  Resp:  [8-20] 20  BP: ()/(54-76) 120/73  SpO2:  [92 %-100 %] 97 %  Resp: 20      2. INTAKE/ OUTPUT:   I/O last 3 completed shifts:  In: 1434.17 [P.O.:240; I.V.:789.17; NG/GT:270]  Out: 3735 [Urine:2500; Drains:1235]    3. PHYSICAL EXAMINATION:   General: Awake, alert  Neuro: A&Ox3, NAD, no focal deficits noted.    Resp: Breathing non-labored, course crackles throughout  CV: RRR, S1, S2  heard   : no durbin  Abdomen: Soft, Non-distended, passing gas   Extremities: warm and well perfused    4. INVESTIGATIONS:   Arterial Blood Gases   Recent Labs   Lab 08/23/19  0439 08/22/19  1549 08/22/19  1309 08/22/19  1035   PH 7.48* 7.40 7.31* 7.33*   PCO2 36 41 53* 48*   PO2 72* 111* 84 83   HCO3 26 26 27 25     Complete Blood Count   Recent Labs   Lab 08/26/19  0305 08/25/19  0258 08/24/19  1556 08/24/19  1016   WBC 7.4 8.8 6.3 7.7   HGB 7.9* 8.0* 7.6* 7.9*   PLT 52* 47* 41* 50*     Basic Metabolic Panel  Recent Labs   Lab 08/26/19  0305 08/25/19  0258 08/24/19  1556 08/24/19  1016    142 141 140   POTASSIUM 4.4 4.6 4.5 4.3   CHLORIDE 108 110* 110* 109   CO2 29 28 28 25   BUN 32* 25 22 19   CR 1.39* 1.13* 0.87 0.79   GLC 79 91 114* 121*     Liver Function Tests  Recent Labs   Lab 08/26/19  0305 08/25/19  0258 08/24/19  1556 08/24/19  1016 08/24/19  0401  08/23/19  1021 08/23/19  0440   AST 28 37 48* 51* 59*   < > 96* 120*   ALT 88* 122* 136* 151* 168*   < > 235* 269*   ALKPHOS 113 112 116 115 110   < > 108 116   BILITOTAL 2.8* 3.5* 3.5* 4.5* 4.3*   < > 4.6* 4.7*   ALBUMIN 2.1* 2.4* 2.4* 2.6* 2.7*   < > 2.6* 2.4*   INR  --  1.19*  --   --  1.25*  --  1.24* 1.26*    < > = values in this interval not displayed.     Pancreatic Enzymes  No lab results found in last 7 days.  Coagulation Profile  Recent Labs   Lab 08/25/19  0258 08/24/19  0401 08/23/19  1021 08/23/19  0440   INR 1.19* 1.25* 1.24* 1.26*         5. RADIOLOGY:   Recent Results (from the past 24 hour(s))   XR Abdomen Port 1 View    Narrative    Exam: XR ABDOMEN PORT 1 VW, 8/25/2019 10:04 AM    Indication: Nj tube placement, advanced further    Comparison: Same day    Findings:   Supine radiograph of the abdomen. Enteric tube distal tip now projects  over the 2nd-3rd segment of the duodenum. Nonobstructive bowel gas  pattern. No pneumatosis identified.      Impression    Impression: Enteric tube distal tip now projects over the 2nd-3rd  segment of  the duodenum.     I have personally reviewed the examination and initial interpretation  and I agree with the findings.    PETER WERNER MD   XR Abdomen Port 1 View    Narrative    Exam: XR ABDOMEN PORT 1 VW, 8/25/2019 6:40 PM    Indication: NG placement    Comparison: Same day 0950    Findings:   Supine radiograph of the abdomen. Enteric tube distal tip projects  over the distal stomach. Nonobstructive bowel gas pattern. No portal  venous gas. No pneumatosis. Stable position of right and left sided  abdominal drains. Stable position of migrated biliary stent.      Impression    Impression: Feeding tube distal tip now projects over the distal  stomach. This could be advanced if postpyloric positioning is desired.    I have personally reviewed the examination and initial interpretation  and I agree with the findings.    LYLA GARCIA MD   XR Abdomen Port 1 View    Narrative    Exam: XR ABDOMEN PORT 1 VW, 8/25/2019 7:28 PM    Indication: TF placement    Comparison: Same day 1828    Findings:   Supine radiograph of the left abdomen. Enteric tube distal tip  projects over the distal stomach/proximal duodenum. Nonobstructive  bowel gas pattern. Visualized lung bases are clear.      Impression    Impression: Feeding tube distal tip projects over the distal  stomach/proximal duodenum. Remainder unchanged.    I have personally reviewed the examination and initial interpretation  and I agree with the findings.    LYLA GARCIA MD       =========================================    Attestation:  I, Nicolle Daniels MD, saw this patient with the resident and agree with the resident and/or medical student's findings and plan of care as documented in the note.       I personally reviewed vital signs, medications, labs and imaging.     Key findings: acute postoperative pain, hepatic encephalopathy, respiratory insufficiency, hyperbilirubinemia, protein calorie deficient malnutrition, thrombocytopenia,  coagulopathy       Evaluation and management time exclusive of procedures was 30 minutes critical care time including:  examination with the ICU team, discussion of the patient's condition with other physicians and members of the care team, reviewing all data related to the patient, and time utilizing the EMR for documentation of this patient's care.     Nicolle Daniels MD  Date of Service (when I saw the patient): Aug 26, 2019

## 2019-08-27 ENCOUNTER — APPOINTMENT (OUTPATIENT)
Dept: OCCUPATIONAL THERAPY | Facility: CLINIC | Age: 59
DRG: 005 | End: 2019-08-27
Payer: COMMERCIAL

## 2019-08-27 ENCOUNTER — APPOINTMENT (OUTPATIENT)
Dept: PHYSICAL THERAPY | Facility: CLINIC | Age: 59
DRG: 005 | End: 2019-08-27
Payer: COMMERCIAL

## 2019-08-27 LAB
ALBUMIN SERPL-MCNC: 1.8 G/DL (ref 3.4–5)
ALP SERPL-CCNC: 115 U/L (ref 40–150)
ALT SERPL W P-5'-P-CCNC: 66 U/L (ref 0–50)
ANION GAP SERPL CALCULATED.3IONS-SCNC: 7 MMOL/L (ref 3–14)
AST SERPL W P-5'-P-CCNC: 29 U/L (ref 0–45)
BASOPHILS # BLD AUTO: 0 10E9/L (ref 0–0.2)
BASOPHILS NFR BLD AUTO: 0.1 %
BILIRUB DIRECT SERPL-MCNC: 1.8 MG/DL (ref 0–0.2)
BILIRUB SERPL-MCNC: 2.6 MG/DL (ref 0.2–1.3)
BUN SERPL-MCNC: 33 MG/DL (ref 7–30)
CALCIUM SERPL-MCNC: 8.5 MG/DL (ref 8.5–10.1)
CHLORIDE SERPL-SCNC: 105 MMOL/L (ref 94–109)
CO2 SERPL-SCNC: 27 MMOL/L (ref 20–32)
CREAT SERPL-MCNC: 1.59 MG/DL (ref 0.52–1.04)
DIFFERENTIAL METHOD BLD: ABNORMAL
EOSINOPHIL # BLD AUTO: 0.2 10E9/L (ref 0–0.7)
EOSINOPHIL NFR BLD AUTO: 1.9 %
ERYTHROCYTE [DISTWIDTH] IN BLOOD BY AUTOMATED COUNT: 18.7 % (ref 10–15)
GFR SERPL CREATININE-BSD FRML MDRD: 35 ML/MIN/{1.73_M2}
GLUCOSE BLDC GLUCOMTR-MCNC: 85 MG/DL (ref 70–99)
GLUCOSE BLDC GLUCOMTR-MCNC: 89 MG/DL (ref 70–99)
GLUCOSE SERPL-MCNC: 97 MG/DL (ref 70–99)
HCT VFR BLD AUTO: 28.1 % (ref 35–47)
HGB BLD-MCNC: 8.6 G/DL (ref 11.7–15.7)
IMM GRANULOCYTES # BLD: 0.2 10E9/L (ref 0–0.4)
IMM GRANULOCYTES NFR BLD: 1.9 %
LYMPHOCYTES # BLD AUTO: 0.3 10E9/L (ref 0.8–5.3)
LYMPHOCYTES NFR BLD AUTO: 4 %
MAGNESIUM SERPL-MCNC: 2.1 MG/DL (ref 1.6–2.3)
MCH RBC QN AUTO: 30.7 PG (ref 26.5–33)
MCHC RBC AUTO-ENTMCNC: 30.6 G/DL (ref 31.5–36.5)
MCV RBC AUTO: 100 FL (ref 78–100)
MONOCYTES # BLD AUTO: 0.7 10E9/L (ref 0–1.3)
MONOCYTES NFR BLD AUTO: 8.2 %
NEUTROPHILS # BLD AUTO: 6.8 10E9/L (ref 1.6–8.3)
NEUTROPHILS NFR BLD AUTO: 83.9 %
NRBC # BLD AUTO: 0 10*3/UL
NRBC BLD AUTO-RTO: 0 /100
PHOSPHATE SERPL-MCNC: 5 MG/DL (ref 2.5–4.5)
PLATELET # BLD AUTO: 70 10E9/L (ref 150–450)
POTASSIUM SERPL-SCNC: 4.2 MMOL/L (ref 3.4–5.3)
PROT SERPL-MCNC: 4.4 G/DL (ref 6.8–8.8)
RBC # BLD AUTO: 2.8 10E12/L (ref 3.8–5.2)
SODIUM SERPL-SCNC: 138 MMOL/L (ref 133–144)
TACROLIMUS BLD-MCNC: 15.5 UG/L (ref 5–15)
TME LAST DOSE: ABNORMAL H
WBC # BLD AUTO: 8.1 10E9/L (ref 4–11)

## 2019-08-27 PROCEDURE — 25000128 H RX IP 250 OP 636: Performed by: PHYSICIAN ASSISTANT

## 2019-08-27 PROCEDURE — 25000132 ZZH RX MED GY IP 250 OP 250 PS 637: Performed by: PHYSICIAN ASSISTANT

## 2019-08-27 PROCEDURE — 97535 SELF CARE MNGMENT TRAINING: CPT | Mod: GO | Performed by: OCCUPATIONAL THERAPIST

## 2019-08-27 PROCEDURE — 25800030 ZZH RX IP 258 OP 636: Performed by: PHYSICIAN ASSISTANT

## 2019-08-27 PROCEDURE — 36592 COLLECT BLOOD FROM PICC: CPT | Performed by: TRANSPLANT SURGERY

## 2019-08-27 PROCEDURE — 25000131 ZZH RX MED GY IP 250 OP 636 PS 637: Performed by: PHYSICIAN ASSISTANT

## 2019-08-27 PROCEDURE — 84100 ASSAY OF PHOSPHORUS: CPT | Performed by: TRANSPLANT SURGERY

## 2019-08-27 PROCEDURE — 97530 THERAPEUTIC ACTIVITIES: CPT | Mod: GO | Performed by: OCCUPATIONAL THERAPIST

## 2019-08-27 PROCEDURE — 80053 COMPREHEN METABOLIC PANEL: CPT | Performed by: TRANSPLANT SURGERY

## 2019-08-27 PROCEDURE — 82248 BILIRUBIN DIRECT: CPT | Performed by: TRANSPLANT SURGERY

## 2019-08-27 PROCEDURE — 83735 ASSAY OF MAGNESIUM: CPT | Performed by: TRANSPLANT SURGERY

## 2019-08-27 PROCEDURE — 97530 THERAPEUTIC ACTIVITIES: CPT | Mod: GP

## 2019-08-27 PROCEDURE — 85025 COMPLETE CBC W/AUTO DIFF WBC: CPT | Performed by: TRANSPLANT SURGERY

## 2019-08-27 PROCEDURE — 00000146 ZZHCL STATISTIC GLUCOSE BY METER IP

## 2019-08-27 PROCEDURE — 12000026 ZZH R&B TRANSPLANT

## 2019-08-27 PROCEDURE — P9047 ALBUMIN (HUMAN), 25%, 50ML: HCPCS | Performed by: PHYSICIAN ASSISTANT

## 2019-08-27 PROCEDURE — 80197 ASSAY OF TACROLIMUS: CPT | Performed by: TRANSPLANT SURGERY

## 2019-08-27 RX ORDER — ONDANSETRON 2 MG/ML
4 INJECTION INTRAMUSCULAR; INTRAVENOUS EVERY 6 HOURS PRN
Status: DISCONTINUED | OUTPATIENT
Start: 2019-08-27 | End: 2019-09-17

## 2019-08-27 RX ORDER — ONDANSETRON 4 MG/1
4 TABLET, ORALLY DISINTEGRATING ORAL EVERY 6 HOURS PRN
Status: DISCONTINUED | OUTPATIENT
Start: 2019-08-27 | End: 2019-09-23 | Stop reason: HOSPADM

## 2019-08-27 RX ORDER — ALBUMIN (HUMAN) 12.5 G/50ML
25 SOLUTION INTRAVENOUS ONCE
Status: COMPLETED | OUTPATIENT
Start: 2019-08-27 | End: 2019-08-27

## 2019-08-27 RX ADMIN — Medication 4 MG: at 08:00

## 2019-08-27 RX ADMIN — Medication 2 DROP: at 02:19

## 2019-08-27 RX ADMIN — OXYCODONE HYDROCHLORIDE 5 MG: 5 TABLET ORAL at 02:25

## 2019-08-27 RX ADMIN — MULTIVITAMIN 15 ML: LIQUID ORAL at 08:00

## 2019-08-27 RX ADMIN — Medication 0.4 MG: at 04:22

## 2019-08-27 RX ADMIN — MYCOPHENOLATE MOFETIL 750 MG: 200 POWDER, FOR SUSPENSION ORAL at 08:00

## 2019-08-27 RX ADMIN — MICAFUNGIN SODIUM 100 MG: 10 INJECTION, POWDER, LYOPHILIZED, FOR SOLUTION INTRAVENOUS at 12:17

## 2019-08-27 RX ADMIN — SALINE NASAL SPRAY 1 SPRAY: 1.5 SOLUTION NASAL at 03:04

## 2019-08-27 RX ADMIN — ASPIRIN 325 MG ORAL TABLET 325 MG: 325 PILL ORAL at 08:00

## 2019-08-27 RX ADMIN — ALBUMIN HUMAN 25 G: 0.25 SOLUTION INTRAVENOUS at 12:02

## 2019-08-27 RX ADMIN — MYCOPHENOLATE MOFETIL 750 MG: 200 POWDER, FOR SUSPENSION ORAL at 18:26

## 2019-08-27 RX ADMIN — Medication 1 MG: at 18:26

## 2019-08-27 RX ADMIN — FAMOTIDINE 20 MG: 20 TABLET ORAL at 08:00

## 2019-08-27 RX ADMIN — Medication 2 DROP: at 20:34

## 2019-08-27 RX ADMIN — MELATONIN 1000 UNITS: at 08:01

## 2019-08-27 RX ADMIN — Medication 100 MG: at 08:00

## 2019-08-27 RX ADMIN — ONDANSETRON 4 MG: 2 INJECTION INTRAMUSCULAR; INTRAVENOUS at 17:11

## 2019-08-27 RX ADMIN — OXYCODONE HYDROCHLORIDE 5 MG: 5 TABLET ORAL at 13:53

## 2019-08-27 RX ADMIN — MELATONIN 1000 UNITS: at 20:34

## 2019-08-27 RX ADMIN — PREDNISONE 5 MG: 5 SOLUTION ORAL at 08:00

## 2019-08-27 RX ADMIN — OXYCODONE HYDROCHLORIDE 5 MG: 5 TABLET ORAL at 18:26

## 2019-08-27 RX ADMIN — LEVOTHYROXINE SODIUM 125 MCG: 0.12 TABLET ORAL at 08:01

## 2019-08-27 ASSESSMENT — ACTIVITIES OF DAILY LIVING (ADL)
ADLS_ACUITY_SCORE: 28

## 2019-08-27 NOTE — PROGRESS NOTES
"CLINICAL NUTRITION SERVICES     Nutrition Prescription     RECOMMENDATIONS FOR MDs/PROVIDERS TO ORDER:  If pt unable to meet 60% average estimated nutrition needs (1055 kcal/day, 67 g protein/day), recommend initiate EN.     Recommendations already ordered by Registered Dietitian (RD):  Supplements: Boost Breeze (vary flavor) 1x daily, Strawberry Boost Plus 1x daily, Gelatein Plus (vary flavor) 1x daily.     Future/Additional Recommendations:  Monitor adequacy of PO intake and tolerance of supplements  --Adjust supplements per pt's preference after she has had a chance to try them.     If EN becomes nutrition POC, recommend goal regimen:   Nutren 1.5 @ 45 mL/hr to provide 1620 kcals (25 kcal/kg/day), 73 gm PRO (1.1 gm/kg/day), 821 mL H2O, 190 gm CHO and no Fiber daily. + 1 pkt Prosource TID  --TF + Prosource: 1740 kcal ( 27 kcal /kg) and 106 gm protein /day(1.7 gm/kg).   -Only once FT placement is confirmed and K+, Mg++, and Phos are WNL, initiate at 10 mL/hr and advance by 10 mL q12hr as tolerated to goal or per provider discretion. Pt at risk for refeeding.  -Do not adv until Mg++, K+ WNL and phos >1.9  -Monitor and replace lytes per high replacement protocol  -H2O flushes 30 mL q4h for tube patency       NEW FINDINGS   Per transplant note, \"NJ placement attempted 8/22, is currently gastric placement, unable to be advanced further during placement due to kinking and nasal bleeding. Pt passed swallow eval, will order diet, assess intake for nutritional needs and if feeding tube migrates.\"    Currently on Regular diet. Met with pt briefly today ( in room at time of visit), pt reported she did not currently have much of an appetite and reports that she would be more agreeable to talking with RD at a later time. Pt is open to trying supplements. Pt's  reports that she prefers fruit flavors.     Labs:   Lytes WNL  Glucose 97    Interventions  Medical food supplement therapy: will order supplements for " pt    Oscar White MS, RD, LD  7A/6D Weekday Pager [phone]

## 2019-08-27 NOTE — PROGRESS NOTES
Brief Nephrology note    Mrs Pemberton' Cr is up from 1.2=>1.4=>1.6 since stopping CRRT ~48h ago, still encouraging overall given 700cc of UOP and 3 unmeasured UOP's yesterday, wt down without diuretic.  Holding on pulling HD line for another day to see if Cr reaches plateau.      Forrest Potts   CNS, Nephrology  [phone]

## 2019-08-27 NOTE — PLAN OF CARE
Vitals: Vitals stable, on 2L NC, drops to upper 80's on room air.  Endocrine: Blood glucose 97 with labs, is on Q4 hour BG checks, has no Insulin orders.  Labs: Improving LFT's, creatinine up to 1.59. Albumin 50 GM IV X1.  Pain: Moans at times with generalized pain. IV Dilaudid DC'd, has Oxycodone if needed.  PRN's: n/a  Diet: Regular diet, declined breakfast and lunch, is on calorie counts. NG in place (gastric placement) for medications only.  LDA: Rt TL internal jugular, D5.45 NS at 50cc/hr, Dialysis catheter in place, plan if labs improve to discontinue per Nephrology. R/L OBED. Left OBED leaking moderate serous drainage.  GI: No stool this shift, large incontinent BM over night.  : Pure wick in place, new placed at 4 am., fair urine output.  Skin: Bruised, skin tears, abdominal incision, moderate generalized edema,  bilateral lymph edema wraps. Diaper in place.    Neuro: Confused, disoriented at times X4, seems better with family here. Bilateral  soft wrist restraints on while in bed, only mitts on this afternoon while in the chair. Tearful at times.   Mobility: Needs lift to get in/out of bed, portable lift until proper room placement. Patient has been in the chair for several hours, PT to get her back into bed at 1430.   Education: Unable to do education due to mentation.  Plan: Encourage activity, diet. Soft restraints placed when family not in the room as patient tries to take out NJ tube.

## 2019-08-27 NOTE — PLAN OF CARE
Discharge Planner OT   Patient plan for discharge: not stated  Current status: OT educated pt an DTaP's spouse on abdominal precautions. Pt mod A supine> EOB, CGA-mod A sitting EOB balance, max A x2 STS taking steps in place, uanble to sequencing transfer to chair. Pt total A x2 golvo lift transfer to chair, VSS throughout.   Barriers to return to prior living situation: medical status  Recommendations for discharge: TCU  Rationale for recommendations: to increase ind in ADLS/IADLS       Entered by: Ольга Jessica 08/27/2019 1:39 PM     Interdisciplinary Non-Pharmacological Management of Delirium:     General Supportive Measures: Ensure adequate hydration and nutrition. Schedule toileting. Appropriate assessment and treatment of pain.   Re-[place] Patient: Ensure clock has correct time and white board has correct date. Communicate clearly, providing explanations as appropriate. Encourage presence of family members for reassurance. Have family/caregiver bring in familiar objects/pictures.  Cognition: Engage in appropriate meaningful communication and activities with patient (current events discussion, word games, magazines, newspapers).   Sensory: Use eyeglasses, hearing aids, or voice amplifiers as appropriate.   Avoid Use of Physical Restraints as Appropriate: Indicate need and frequently re-assess durbin catheters and other tethers (cap IVs if medically appropriate).   Maintain Mobility and Self Care Ability: Avoid bedrest. Have patient up in chair for meals if appropriate.   Normalize Sleep-Wake Cycle: Discourage too much daytime napping (less than 30 minutes at a time), aim for uninterrupted periods of sleep at night.   Days: Keep room well light with lights on and shades open.   Night: Keep room quiet with low level lighting.   For Agitation: Avoid overstimulating environment, try music, massage, appropriate TV stations, and relaxation techniques. Take patient for a walk if appropriate, even if in the middle of  the night.   Safety Concerns: Patients with delirium are at high risk for falls. Use bed and chair alarms along with frequent surveillance.

## 2019-08-27 NOTE — PLAN OF CARE
/87   Pulse 89   Temp 97.3  F (36.3  C) (Oral)   Resp 20   Wt 101.2 kg (223 lb 1.7 oz)   SpO2 95%   BMI 40.81 kg/m      Patient VSS on 2 LPM NC, afebrile. Disoriented to situation, confused, pulling at lines when mitts taken off for trial. Soft bilateral UE restraints active, order active until 1930 8/27. Denies pain, comfortably managed in bed, some aches with activity. Tolerating regular diet with no appetite. NG clamped, PM meds given down tube. R internal jugular infusing D5 1/2NS @ 50 ml/h, L HD access-SL, L PIV-SL. 2x loose/brown BM, once continent, other time incontinent. Incontinent of urine, purewick active, interdry applied inbetween folds. Midline incision sutured, L OBED 325 ml serosang output, leaky @ site, dressing changed. R OBED 175 ml serosang output, dressing changed. L thigh skin tear primapore changed. Lymphedema wraps on, generalized edema 3-4+.  Up with 2+ assist and walker at bedside standing. Ordered bedside commode and walker. Ordered pulsate mattress. Continue to manage restraints and encourage nutrition.   Will continue with POC and notify MD with changes or concerns.

## 2019-08-27 NOTE — PLAN OF CARE
2300-0730:  Patient VSS on 2 LPM NC, afebrile. Disorientedx3, confused, pulling at lines even with mitts on. Soft bilateral UE restraints active, order active until 1930 8/27. C/o of abd pain, gave PRN dilaudid x2 and oxy x1. On regular diet with decreased appetite. NG clamped, PM meds given down tube. R internal jugular infusing D5 1/2NS @ 50 ml/h, L HD access-SL, L PIV-SL. 1x loose/brown BM, incontinent. Incontinent of urine, purewick active. Midline incision sutured, L OBED serosang output, leaky @ site, dressing changed overnight. R OBED serosang output, dressing DCI. L thigh skin tear covered with primapore. Lymphedema wraps on, generalized edema 3-4+.  Up with 2+ assist and walker at bedside standing. Pulsate mattress ordered. Continue to manage restraints and encourage nutrition. Will continue with POC and notify MD with changes or concerns.

## 2019-08-27 NOTE — PROGRESS NOTES
Transplant Surgery  Inpatient Daily Progress Note  08/27/2019    Assessment & Plan: Nicci Pemberton is a 58 yo female with a past medical history significant for end stage liver disease 2/2 ANGULO and alpha 1 anti-trypsin deficiency now s/p DD OLT on 8/18/19    Graft function: Good, AST and ALT now trending down. Tbili 2.6<-2.8 (3.5). R drain output decreasing, L drain output still high. Will plan to remove right OBED tomorrow if output remains low.   Immunosuppression management:  mg BID, Tac dose currently 4 mg BID, levels ordered every 48 hours. 8/25 level 5.2. 8/27 level pending. Continue pred 5mg daily until tac level therapeutic. Complexity of management: Medium. Contributing factors: anemia and induction  Hematology: Acute blood loss anemia. Transfusion goal hgb > 7. Hgb stable at this time. Last transfused 3 units 8/20 overnight.  HEENT: Post-op nasal bleeding, examination revealed no direct lesion, overall friable mucosa. Re-packed 8/21. Re-bleeding with feeding tube placement 8/22, ENT re-packed around tube. Now stable.  Cardiorespiratory: Patient extubated 8/24  Hx of hypertension on spironolactone and bumex at home. BPs stable.  GI/Nutrition: NJ placement attempted 8/22, is currently gastric placement, unable to be advanced further during placement due to kinking and nasal bleeding. Pt passed swallow eval, will order diet, assess intake for nutritional needs and if feeding tube migrates.  Endocrine: Steroid induced hyperglycemia, resolving, on sliding scale insulin.  Fluid/Electrolytes: GAMAL- CRRT stopped patient will get HD as needed, so far no acute indication. 900 ml UOP. Cr 1.6<-1.4<-1.1<-0.9. Edematous. Will give albumin IV today x and monitor.   : voiding  Infectious disease: Ppx with micafungin x 10 days, zosyn completed.  Prophylaxis: DVT, fall, GI, fungal  Disposition: 7A     Medical Decision Making: Medium  Subsequent visit 07470 (moderate level decision making)    VEENA/Fellow/Resident  Provider: Juliet Philippe PA-C [id number]    Faculty: Hui Cross M.D.    __________________________________________________________________  Transplant History: Admitted 8/16/2019 for acute decompensated ANGULO.   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    8/18/2019 (Liver), Postoperative day: 9     Interval History:   Confusion waxing and waning throughout the day. Resting this AM.     ROS:   A 10-point review of systems was negative except as noted above.    Curent Meds:    albumin human  25 g Intravenous Once     aspirin  325 mg Oral Daily     carboxymethylcellulose PF  2 drop Both Eyes Q6H     dapsone  100 mg Oral Daily     famotidine  20 mg Oral Daily     hemostatic matrix  1 kit Other Once     levothyroxine  125 mcg Oral Daily     micafungin  100 mg Intravenous Q24H     multivitamins w/minerals  15 mL Per Feeding Tube Daily     mycophenolate  750 mg Oral BID IS    Or     mycophenolate  750 mg Oral or NG Tube BID IS     oxidized cellulose   Topical Once     oxymetazoline  2 spray Both Nostrils BID     polyethylene glycol  17 g Oral BID     predniSONE  5 mg Per Feeding Tube Daily     protein modular  1 packet Per Feeding Tube TID     senna-docusate  2 tablet Oral BID     sodium chloride  1 spray Both Nostrils Q4H     sodium chloride (PF)  3 mL Intravenous Q8H     sodium chloride (PF)  3 mL Intracatheter Q8H     tacrolimus  4 mg Per Feeding Tube BID IS     valGANciclovir  450 mg Oral Once per day on Mon Fri    Or     valGANciclovir  450 mg Oral or NG Tube Once per day on Mon Fri     vitamin D3  1,000 Units Oral BID       Physical Exam:     Admit Weight: 104.3 kg (230 lb)    Current Vitals:   /73 (BP Location: Right arm)   Pulse 89   Temp 98.8  F (37.1  C) (Oral)   Resp 20   Wt 98.2 kg (216 lb 7.9 oz)   SpO2 94%   BMI 39.60 kg/m      Vital sign ranges:    Temp:  [97.3  F (36.3  C)-98.9  F (37.2  C)] 98.8  F (37.1  C)  Pulse:  [89] 89  Heart Rate:  [82-96] 93  Resp:   [10-27] 20  BP: (107-124)/(64-87) 109/73  SpO2:  [93 %-97 %] 94 %  Patient Vitals for the past 24 hrs:   BP Temp Temp src Pulse Heart Rate Resp SpO2 Weight   08/27/19 1012 -- -- -- -- -- -- -- 98.2 kg (216 lb 7.9 oz)   08/27/19 0700 109/73 98.8  F (37.1  C) Oral -- 93 20 94 % --   08/27/19 0303 107/67 98.9  F (37.2  C) Oral -- 93 20 93 % --   08/26/19 2348 110/64 98  F (36.7  C) Oral -- 96 20 97 % --   08/26/19 2002 120/87 97.3  F (36.3  C) Oral 89 89 20 95 % --   08/26/19 1715 124/70 98.5  F (36.9  C) Oral -- 88 20 96 % --   08/26/19 1400 108/66 -- -- -- 87 27 93 % --   08/26/19 1300 114/73 -- -- -- 82 10 96 % --   08/26/19 1200 117/74 98  F (36.7  C) Axillary -- 89 17 96 % --     General Appearance: NAD  HEENT: Feeding tube in nare  Skin: normal, warm, scattered bruising  Heart: NSR  Lungs: NLB on 1 L oxygen.   Abdomen: soft, nondistended, incision sutured, c/d/i. JPs with serosanguinous output  : External catheter in place  Extremities: edema: present bilaterally. 3+.  Neurologic: Alert, follows commands    Frailty Scores     There is no flowsheet data to display.          Data:   CMP  Recent Labs   Lab 08/27/19  0610 08/26/19  0305  08/24/19  1456  08/24/19  1015    142   < >  --    < >  --    POTASSIUM 4.2 4.4   < >  --    < >  --    CHLORIDE 105 108   < >  --    < >  --    CO2 27 29   < >  --    < >  --    GLC 97 79   < >  --    < >  --    BUN 33* 32*   < >  --    < >  --    CR 1.59* 1.39*   < >  --    < >  --    GFRESTIMATED 35* 41*   < >  --    < >  --    GFRESTBLACK 41* 48*   < >  --    < >  --    JACOB 8.5 8.3*   < >  --    < >  --    ICAW  --   --   --  4.8  --  4.5   MAG 2.1 2.3   < >  --    < >  --    PHOS 5.0* 5.0*   < >  --    < >  --    ALBUMIN 1.8* 2.1*   < >  --    < >  --    BILITOTAL 2.6* 2.8*   < >  --    < >  --    ALKPHOS 115 113   < >  --    < >  --    AST 29 28   < >  --    < >  --    ALT 66* 88*   < >  --    < >  --     < > = values in this interval not displayed.     CBC  Recent Labs    Lab 08/27/19  0610 08/26/19  0305   HGB 8.6* 7.9*   WBC 8.1 7.4   PLT 70* 52*     COAGS  Recent Labs   Lab 08/25/19  0258 08/24/19  0401   INR 1.19* 1.25*

## 2019-08-27 NOTE — PLAN OF CARE
Discharge Planner PT  7A  Patient plan for discharge: Not stated  Current status: Progressed standing tolerance this date with use of EZ stand. Pt performs sit<>stand x3 with assist of 2 and use of EZ stand, dependently transferred chair>EOB via EZ stand. Pt tolerates up to 45 seconds in standing position, needs frequent tactile and verbal cues for upright posture. Pt needs frequent redirection throughout, appears lethargic and confused. MaxAx2 for sit>supine. Recommend nursing staff utilize OH lift for all transfers at this time.   Barriers to return to prior living situation: Medical status, level of assist for mobility, abdominal precautions, weakness, decreased activity tolerance, impaired cognition  Recommendations for discharge: TCU  Rationale for recommendations: Pt currently significantly below baseline in regards to functional mobility, would benefit from continued skilled therapy services to progress strength, endurance, balance, and safety and independence with functional mobility.       Entered by: Naida Khalil 08/27/2019 4:18 PM

## 2019-08-28 ENCOUNTER — APPOINTMENT (OUTPATIENT)
Dept: GENERAL RADIOLOGY | Facility: CLINIC | Age: 59
DRG: 005 | End: 2019-08-28
Attending: PHYSICIAN ASSISTANT
Payer: COMMERCIAL

## 2019-08-28 ENCOUNTER — APPOINTMENT (OUTPATIENT)
Dept: PHYSICAL THERAPY | Facility: CLINIC | Age: 59
DRG: 005 | End: 2019-08-28
Payer: COMMERCIAL

## 2019-08-28 LAB
ALBUMIN SERPL-MCNC: 2 G/DL (ref 3.4–5)
ALP SERPL-CCNC: 95 U/L (ref 40–150)
ALT SERPL W P-5'-P-CCNC: 52 U/L (ref 0–50)
ANION GAP SERPL CALCULATED.3IONS-SCNC: 7 MMOL/L (ref 3–14)
AST SERPL W P-5'-P-CCNC: 23 U/L (ref 0–45)
BASOPHILS # BLD AUTO: 0 10E9/L (ref 0–0.2)
BASOPHILS NFR BLD AUTO: 0.1 %
BILIRUB DIRECT SERPL-MCNC: 1.6 MG/DL (ref 0–0.2)
BILIRUB SERPL-MCNC: 2.3 MG/DL (ref 0.2–1.3)
BUN SERPL-MCNC: 31 MG/DL (ref 7–30)
CALCIUM SERPL-MCNC: 8.7 MG/DL (ref 8.5–10.1)
CHLORIDE SERPL-SCNC: 105 MMOL/L (ref 94–109)
CO2 SERPL-SCNC: 27 MMOL/L (ref 20–32)
CREAT SERPL-MCNC: 1.65 MG/DL (ref 0.52–1.04)
DIFFERENTIAL METHOD BLD: ABNORMAL
EOSINOPHIL # BLD AUTO: 0.1 10E9/L (ref 0–0.7)
EOSINOPHIL NFR BLD AUTO: 1.5 %
ERYTHROCYTE [DISTWIDTH] IN BLOOD BY AUTOMATED COUNT: 18.6 % (ref 10–15)
GFR SERPL CREATININE-BSD FRML MDRD: 34 ML/MIN/{1.73_M2}
GLUCOSE SERPL-MCNC: 92 MG/DL (ref 70–99)
HCT VFR BLD AUTO: 29.5 % (ref 35–47)
HGB BLD-MCNC: 8.8 G/DL (ref 11.7–15.7)
IMM GRANULOCYTES # BLD: 0.1 10E9/L (ref 0–0.4)
IMM GRANULOCYTES NFR BLD: 1.3 %
LYMPHOCYTES # BLD AUTO: 0.3 10E9/L (ref 0.8–5.3)
LYMPHOCYTES NFR BLD AUTO: 4.3 %
MAGNESIUM SERPL-MCNC: 2.1 MG/DL (ref 1.6–2.3)
MCH RBC QN AUTO: 29.9 PG (ref 26.5–33)
MCHC RBC AUTO-ENTMCNC: 29.8 G/DL (ref 31.5–36.5)
MCV RBC AUTO: 100 FL (ref 78–100)
MONOCYTES # BLD AUTO: 0.6 10E9/L (ref 0–1.3)
MONOCYTES NFR BLD AUTO: 8.5 %
NEUTROPHILS # BLD AUTO: 5.6 10E9/L (ref 1.6–8.3)
NEUTROPHILS NFR BLD AUTO: 84.3 %
NRBC # BLD AUTO: 0 10*3/UL
NRBC BLD AUTO-RTO: 0 /100
PHOSPHATE SERPL-MCNC: 4.7 MG/DL (ref 2.5–4.5)
PLATELET # BLD AUTO: 83 10E9/L (ref 150–450)
POTASSIUM SERPL-SCNC: 4.4 MMOL/L (ref 3.4–5.3)
PROT SERPL-MCNC: 4.5 G/DL (ref 6.8–8.8)
RBC # BLD AUTO: 2.94 10E12/L (ref 3.8–5.2)
SODIUM SERPL-SCNC: 138 MMOL/L (ref 133–144)
TACROLIMUS BLD-MCNC: 17.6 UG/L (ref 5–15)
TME LAST DOSE: ABNORMAL H
WBC # BLD AUTO: 6.7 10E9/L (ref 4–11)

## 2019-08-28 PROCEDURE — 25800025 ZZH RX 258: Performed by: PHYSICIAN ASSISTANT

## 2019-08-28 PROCEDURE — 85025 COMPLETE CBC W/AUTO DIFF WBC: CPT | Performed by: TRANSPLANT SURGERY

## 2019-08-28 PROCEDURE — 80053 COMPREHEN METABOLIC PANEL: CPT | Performed by: TRANSPLANT SURGERY

## 2019-08-28 PROCEDURE — 84100 ASSAY OF PHOSPHORUS: CPT | Performed by: TRANSPLANT SURGERY

## 2019-08-28 PROCEDURE — 25000132 ZZH RX MED GY IP 250 OP 250 PS 637: Performed by: PHYSICIAN ASSISTANT

## 2019-08-28 PROCEDURE — 12000026 ZZH R&B TRANSPLANT

## 2019-08-28 PROCEDURE — 74018 RADEX ABDOMEN 1 VIEW: CPT

## 2019-08-28 PROCEDURE — 27210429 ZZH NUTRITION PRODUCT INTERMEDIATE LITER

## 2019-08-28 PROCEDURE — 25000125 ZZHC RX 250: Performed by: RADIOLOGY

## 2019-08-28 PROCEDURE — 80197 ASSAY OF TACROLIMUS: CPT | Performed by: TRANSPLANT SURGERY

## 2019-08-28 PROCEDURE — 76000 FLUOROSCOPY <1 HR PHYS/QHP: CPT

## 2019-08-28 PROCEDURE — 25000131 ZZH RX MED GY IP 250 OP 636 PS 637: Performed by: PHYSICIAN ASSISTANT

## 2019-08-28 PROCEDURE — 82248 BILIRUBIN DIRECT: CPT | Performed by: TRANSPLANT SURGERY

## 2019-08-28 PROCEDURE — 83735 ASSAY OF MAGNESIUM: CPT | Performed by: TRANSPLANT SURGERY

## 2019-08-28 PROCEDURE — 25000128 H RX IP 250 OP 636: Performed by: PHYSICIAN ASSISTANT

## 2019-08-28 PROCEDURE — 25800030 ZZH RX IP 258 OP 636: Performed by: PHYSICIAN ASSISTANT

## 2019-08-28 PROCEDURE — 36592 COLLECT BLOOD FROM PICC: CPT | Performed by: TRANSPLANT SURGERY

## 2019-08-28 PROCEDURE — 97530 THERAPEUTIC ACTIVITIES: CPT | Mod: GP

## 2019-08-28 RX ORDER — LIDOCAINE HYDROCHLORIDE 20 MG/ML
5 SOLUTION OROPHARYNGEAL ONCE
Status: COMPLETED | OUTPATIENT
Start: 2019-08-28 | End: 2019-08-28

## 2019-08-28 RX ORDER — AMINO AC/PROTEIN HYDR/WHEY PRO 10G-100/30
1 LIQUID (ML) ORAL 2 TIMES DAILY
Status: DISCONTINUED | OUTPATIENT
Start: 2019-08-28 | End: 2019-09-13

## 2019-08-28 RX ORDER — QUETIAPINE FUMARATE 25 MG/1
25 TABLET, FILM COATED ORAL AT BEDTIME
Status: DISCONTINUED | OUTPATIENT
Start: 2019-08-28 | End: 2019-08-28

## 2019-08-28 RX ORDER — LANOLIN ALCOHOL/MO/W.PET/CERES
3 CREAM (GRAM) TOPICAL AT BEDTIME
Status: DISCONTINUED | OUTPATIENT
Start: 2019-08-28 | End: 2019-09-23 | Stop reason: HOSPADM

## 2019-08-28 RX ORDER — QUETIAPINE FUMARATE 25 MG/1
25 TABLET, FILM COATED ORAL AT BEDTIME
Status: DISCONTINUED | OUTPATIENT
Start: 2019-08-28 | End: 2019-08-30

## 2019-08-28 RX ORDER — DIPHENHYDRAMINE HCL 25 MG
25 CAPSULE ORAL
Status: DISCONTINUED | OUTPATIENT
Start: 2019-08-28 | End: 2019-08-28

## 2019-08-28 RX ADMIN — PREDNISONE 5 MG: 5 SOLUTION ORAL at 07:52

## 2019-08-28 RX ADMIN — Medication 3 MG: at 07:52

## 2019-08-28 RX ADMIN — QUETIAPINE FUMARATE 25 MG: 25 TABLET ORAL at 20:38

## 2019-08-28 RX ADMIN — MELATONIN 1000 UNITS: at 07:52

## 2019-08-28 RX ADMIN — LEVOTHYROXINE SODIUM 125 MCG: 0.12 TABLET ORAL at 07:53

## 2019-08-28 RX ADMIN — MYCOPHENOLATE MOFETIL 750 MG: 200 POWDER, FOR SUSPENSION ORAL at 07:52

## 2019-08-28 RX ADMIN — DEXTROSE AND SODIUM CHLORIDE: 5; 450 INJECTION, SOLUTION INTRAVENOUS at 14:20

## 2019-08-28 RX ADMIN — LIDOCAINE HYDROCHLORIDE 5 ML: 20 SOLUTION ORAL; TOPICAL at 13:33

## 2019-08-28 RX ADMIN — Medication 2 DROP: at 20:38

## 2019-08-28 RX ADMIN — MYCOPHENOLATE MOFETIL 750 MG: 250 CAPSULE ORAL at 17:56

## 2019-08-28 RX ADMIN — Medication 2 DROP: at 07:52

## 2019-08-28 RX ADMIN — FAMOTIDINE 20 MG: 20 TABLET ORAL at 07:53

## 2019-08-28 RX ADMIN — MICAFUNGIN SODIUM 100 MG: 10 INJECTION, POWDER, LYOPHILIZED, FOR SOLUTION INTRAVENOUS at 11:32

## 2019-08-28 RX ADMIN — Medication 100 MG: at 07:52

## 2019-08-28 RX ADMIN — ASPIRIN 325 MG ORAL TABLET 325 MG: 325 PILL ORAL at 07:52

## 2019-08-28 RX ADMIN — MULTIVITAMIN 15 ML: LIQUID ORAL at 07:53

## 2019-08-28 RX ADMIN — MELATONIN TAB 3 MG 3 MG: 3 TAB at 20:38

## 2019-08-28 ASSESSMENT — ACTIVITIES OF DAILY LIVING (ADL)
ADLS_ACUITY_SCORE: 24
ADLS_ACUITY_SCORE: 28
ADLS_ACUITY_SCORE: 24
ADLS_ACUITY_SCORE: 28
ADLS_ACUITY_SCORE: 28
ADLS_ACUITY_SCORE: 24

## 2019-08-28 NOTE — PLAN OF CARE
Edema 7A: RN requesting pt be allowed to rest in am 2/2 agitation and combative overnight.  Pt then working with PT later in the am.  Unable to check back 2/2 scheduling.  Reschedule to 8/29.

## 2019-08-28 NOTE — PROGRESS NOTES
.Calorie Count  Intake recorded for: 8/27  Total Kcals: 0 Total Protein: 0g  Kcals from Hospital Food: 0   Protein: 0g  Kcals from Outside Food (average):0 Protein: 0g  # Meals Recorded: 1 meal ordered from kitchen, no intake recorded.   # Supplements Recorded: no intake recorded.

## 2019-08-28 NOTE — PLAN OF CARE
"/73 (BP Location: Right arm)   Pulse 89   Temp 97.6  F (36.4  C) (Oral)   Resp 16   Wt 98.2 kg (216 lb 7.9 oz)   SpO2 93%   BMI 39.60 kg/m      [id number]: Disoriented x4; pt woke up extremely confused this evening. Swatting at staff, crying, asking \"why am I in a different bedroom?\", not allowing staff to touch her. Not redirectable and unable to reorient. RN called patient's  and he talked to the patient. She calmed down after speaking with her , but is still very confused. She is in mitts due to constant pulling at lines. Bed alarm is on. Checking on patient every 30 minutes. Soft restraint order is available if needed, but patient is currently calm. Will initiate VPM.    VSS on 2L O2. 86% on RA, but 95% on 2L. Regular diet + calorie counts; ate two bites of protein jello and refused anything else. Pt is on the priority list for a private room with a lift; currently assist x2 with turn and repositioning Q2H. Purewick in place. Incontinent BM x1. IV zofran given x1 for nausea. Oxy given x1 for pain. R internal jugular with MIVF @ 50mL/hr. L HD cath SL. R OBED with 175mL serosanguinous output. L OBED with 350mL serosanguinous output with copious leaking. Clamshell incision with sutures. BLE lymphedema wraps on. RN updated  after he spoke with the patient this evening. He was at the bedside this afternoon, as was the patient's mother and brother. Will continue to monitor closely and notify the team of any acute changes.   "

## 2019-08-28 NOTE — PROGRESS NOTES
Neuro: Alert, disoriented x4, mitts on for pt pulling at lines. Illogical speech. VPM initiated for pt safety. Soft restraint order available.    Cardiac: Afebrile, VSS.   Respiratory: 2L per nasal cannula   GI/: Voiding spontaneously with purewick. 1 large loose BM this shift. Incontinent.   Diet/appetite: Tolerating regular diet, poor appetite. Fransico counts maintained. Denies nausea   Activity: Up with mech lift assist, turns q2h and PRN for comfort   Pain: . Denies  Skin: No new deficits noted. Clamshell incision SILVIA, CDI. Lymph wraps on bilaterally.   Lines: RIJ infusing MIVF at 50cc/hr   Drains: Bilateral OBED drains, both leaking at site, dressings changed x2 overnight. Both to bulb suction. L output > R output.     Pt has been restless throughout night, will continue to monitor and follow plan of care.

## 2019-08-28 NOTE — PLAN OF CARE
OT: cx-attempted to see patient following PT session, she was sleeping soundly in chair, unable to awaken enough to participate in OT.

## 2019-08-28 NOTE — PROGRESS NOTES
CLINICAL NUTRITION SERVICES - REASSESSMENT NOTE     Nutrition Prescription    Malnutrition Status:    Severe malnutrition in the context of acute on chronic illness    Recommendations already ordered by Registered Dietitian (RD):  Ordered TF (in case FT appropriate to use):   Nutren 1.5 @ 45 mL/hr + 2  pkts ProSource TF daily to provide 1700 kcals (27 kcal/kg/day), 95 g PRO (1.5 g/kg/day), 821 mL H2O, 190 g CHO and no Fiber daily.  Resume TF at 15 ml/hr with advancement by 10 ml/hr q 8 hours to goal rate of 45 ml/hr.     Adjusted supplement order to reflect patient's preferences.     Future/Additional Recommendations:  Please consider feeding gastrically if unable to secure post-pyloric access.  Could consider the use of reglan for quicker gastric emptying.     Last resort (if unable to secure FT access - NGT or NJT) - could consider parenteral nutrition.  TPN recommendations (if necessary): 1200 ml - Dex 160 grams, AA 95 grams + 250 ml IV lipids 5x/week.  Pending lytes/BG advance dextrose by ~40 grams daily to goal of 240 grams/day.  DW = 62 kg.      EVALUATION OF THE PROGRESS TOWARD GOALS   Diet: Regular + supplements (Boost Breeze + Boost Plus + Gelatein Plus) between meals    Intake: Patient received a 7 day average from TF (8/19-8/25) of ~770 kcal and 35 grams protein (~40% of needs).  Minimal nutrition received since 8/25 (~3-4 days).  Per discussion with , patient took a few sips of water via straw today and yesterday was unable to consume liquids with a straw.  Had tried a few bites of the high protein gelatein, however patient indicated she disliked it.  Nursing reports she dislikes the FT and indicates she is motivated to get it out.      NEW FINDINGS   GI: Having 1-2 BM daily    Labs: Phos 4.7 (elevated), K+ normal    Weight: Down 6.1 kg (5.8%) over the last ~11 days.    Updated assessed needs d/t this weight change.     Dosing Weight: 62 kg adjusted wt from admit wt of 98.2 kg on 8/28 and IBW of  50 kg   ASSESSED NUTRITION NEEDS  Estimated Energy Needs: 6372-9166 kcals/day (25-30 kcals/kg)  Justification: Liver transplant needs, obesity - aim for high end needs enterally  Estimated Protein Needs:  grams protein/day (1.5 - 2 grams of pro/kg)  Justification:  Post-op liver transplant  Estimated Fluid Needs:  Justification: Per provider pending fluid status    MALNUTRITION  % Intake: </= 50% for >/= 5 days (severe)  % Weight Loss: > 2% in 1 week (severe)  Subcutaneous Fat Loss: Temporal, Facial region:  Mild-moderate  Muscle Loss: Temporal, Scapular bone, Thoracic region (clavicle, acromium bone, deltoid, trapezius, pectoral), Upper arm (bicep, tricep), Lower arm  (forearm):  Moderate-severe  Fluid Accumulation/Edema: Moderate (legs, knees)  Malnutrition Diagnosis: Severe malnutrition in the context of acute on chronic illness    Previous Goals   Total avg nutritional intake to meet a minimum of 25 kcal/kg and 1.5 g PRO/kg daily (per dosing wt 64 kg).  Evaluation: Not met    Previous Nutrition Diagnosis  Inadequate protein-energy intake  Evaluation: Declining    CURRENT NUTRITION DIAGNOSIS  Inadequate protein-energy intake related to lack of post-pyloric FT access, poor PO intake as evidenced by patient receiving minimal nutritional intake over the last 3 days and ~40% of assessed needs on average daily the 7 days before this.       INTERVENTIONS  Implementation  Collaboration with other providers - Discussed nutritional status and need for intervention with primary team.  Encouraged team to consider feeding via NGT, even if only half goal volume feeds.  Also discussed possibly re-attempt at FT advancement past pylorus.  Team opted to try advancing FT again.  This will be done by radiology.      Ordered TF orders in case FT reposition is successful.     Goals  Total avg nutritional intake to meet a minimum of 25 kcal/kg and 1.5 g PRO/kg daily (per dosing wt 62 kg).    Monitoring/Evaluation  Progress  toward goals will be monitored and evaluated per protocol.    Lela Ng MS, RD, LD  Pager [phone]

## 2019-08-28 NOTE — PROGRESS NOTES
Transplant Surgery  Inpatient Daily Progress Note  08/28/2019    Assessment & Plan: Nicci Pemberton is a 60 yo female with a past medical history significant for end stage liver disease 2/2 ANGULO and alpha 1 anti-trypsin deficiency now s/p DD OLT on 8/18/19    Graft function: Good, AST and ALT now trending down or stable. Tbili 2.6 (2.8). R drain output decreasing, L drain output still high. Will plan to remove right OBED either later today or tomorrow   Immunosuppression management:  mg BID, Tac dose currently 4 mg BID, levels ordered every 48 hours. 8/25 level 5.2. 8/27 level high at 15.5, 1 mg given 8/27 PM and dose decreased to 3 mg BID. Will stop pred 5mg daily now that tac level therapeutic. Complexity of management: Medium. Contributing factors: anemia and induction  Hematology: Acute blood loss anemia. Transfusion goal hgb > 7. Hgb stable at this time. Last transfused 3 units 8/20 overnight.  HEENT: Post-op nasal bleeding, examination revealed no direct lesion, overall friable mucosa. Re-packed 8/21. Re-bleeding with feeding tube placement 8/22, ENT re-packed around tube. Now stable. Will need to try to re-advance feeding tube today, will need to monitor for bleeding post.  Cardiorespiratory: Patient extubated 8/24  Hx of hypertension on spironolactone and bumex at home. BPs stable.  Neuro: Toxic and metabolic encephalopathy likely secondary to poor sleep, drugs, acute illness. Will order melatonin and bedtime seroquel tonight to try to hep patient sleep overnight.   GI/Nutrition: NJ placement attempted 8/22, is currently gastric placement, unable to be advanced further during placement due to kinking and nasal bleeding. Pt passed swallow eval, will order diet, but has had minimal intake so will try to advance feeding tube.  Endocrine: Steroid induced hyperglycemia, resolving, on sliding scale insulin.  Fluid/Electrolytes: GAMAL- CRRT stopped patient will get HD as needed, so far no indication. 1100 ml UOP  plus some incontinent episodes. Cr 1.6<-1.4<-1.1<-0.9. Edematous.   : Purewick catheter  Infectious disease: Ppx with micafungin x 10 days, zosyn completed.  Prophylaxis: DVT, fall, GI, fungal  Disposition: 7A     Medical Decision Making: Medium  Subsequent visit 10951 (moderate level decision making)    VEENA/Fellow/Resident Provider: Mary Braxton PA-C [id number]    Faculty: Hui Cross M.D.    __________________________________________________________________  Transplant History: Admitted 8/16/2019 for acute decompensated ANGULO.   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    8/18/2019 (Liver), Postoperative day: 10     Interval History:   Confusion waxing and waning throughout the day. Resting this AM.     ROS:   A 10-point review of systems was negative except as noted above.    Curent Meds:    aspirin  325 mg Oral Daily     carboxymethylcellulose PF  2 drop Both Eyes Q6H     dapsone  100 mg Oral Daily     famotidine  20 mg Oral Daily     hemostatic matrix  1 kit Other Once     levothyroxine  125 mcg Oral Daily     melatonin  3 mg Oral At Bedtime     micafungin  100 mg Intravenous Q24H     multivitamins w/minerals  15 mL Per Feeding Tube Daily     mycophenolate  750 mg Oral BID IS    Or     mycophenolate  750 mg Oral or NG Tube BID IS     oxidized cellulose   Topical Once     oxymetazoline  2 spray Both Nostrils BID     polyethylene glycol  17 g Oral BID     predniSONE  5 mg Per Feeding Tube Daily     protein modular  1 packet Per Feeding Tube TID     QUEtiapine  25 mg Oral At Bedtime     senna-docusate  2 tablet Oral BID     sodium chloride  1 spray Both Nostrils Q4H     sodium chloride (PF)  3 mL Intravenous Q8H     sodium chloride (PF)  3 mL Intracatheter Q8H     tacrolimus  3 mg Per Feeding Tube BID IS     valGANciclovir  450 mg Oral Once per day on Mon Fri    Or     valGANciclovir  450 mg Oral or NG Tube Once per day on Mon Fri     vitamin D3  1,000 Units Oral BID        Physical Exam:     Admit Weight: 104.3 kg (230 lb)    Current Vitals:   BP (!) 117/98 (BP Location: Right arm)   Pulse 90   Temp 97.4  F (36.3  C) (Oral)   Resp 18   Wt 98.2 kg (216 lb 7.9 oz)   SpO2 95%   BMI 39.60 kg/m      Vital sign ranges:    Temp:  [97.4  F (36.3  C)-98.2  F (36.8  C)] 97.4  F (36.3  C)  Pulse:  [90-95] 90  Heart Rate:  [83-89] 89  Resp:  [16-18] 18  BP: ()/(67-98) 117/98  SpO2:  [86 %-96 %] 95 %  Patient Vitals for the past 24 hrs:   BP Temp Temp src Pulse Heart Rate Resp SpO2   08/28/19 0718 (!) 117/98 97.4  F (36.3  C) Oral 90 -- 18 95 %   08/28/19 0021 102/70 97.4  F (36.3  C) Oral 95 -- 18 95 %   08/27/19 1928 117/73 97.6  F (36.4  C) Oral -- 89 16 93 %   08/27/19 1644 -- -- -- -- -- -- 95 %   08/27/19 1640 -- -- -- -- -- -- (!) 86 %   08/27/19 1550 117/67 98.2  F (36.8  C) Oral -- 83 18 91 %   08/27/19 1200 91/78 -- -- -- -- -- 96 %     General Appearance: Sleepy  HEENT: Feeding tube in nare  Skin: normal, warm, scattered bruising  Heart: NSR  Lungs: NLB on 2 L oxygen.   Abdomen: soft, nondistended, incision sutured, c/d/i. JPs with serosanguinous output  : External catheter in place  Extremities: edema: present bilaterally. 3+.  Neurologic: Arousable, sleepy this AM    Frailty Scores     There is no flowsheet data to display.          Data:   CMP  Recent Labs   Lab 08/28/19  0700 08/27/19  0610  08/24/19  1456  08/24/19  1015    138   < >  --    < >  --    POTASSIUM 4.4 4.2   < >  --    < >  --    CHLORIDE 105 105   < >  --    < >  --    CO2 27 27   < >  --    < >  --    GLC 92 97   < >  --    < >  --    BUN 31* 33*   < >  --    < >  --    CR 1.65* 1.59*   < >  --    < >  --    GFRESTIMATED 34* 35*   < >  --    < >  --    GFRESTBLACK 39* 41*   < >  --    < >  --    JACOB 8.7 8.5   < >  --    < >  --    ICAW  --   --   --  4.8  --  4.5   MAG 2.1 2.1   < >  --    < >  --    PHOS 4.7* 5.0*   < >  --    < >  --    ALBUMIN 2.0* 1.8*   < >  --    < >  --    BILITOTAL  2.3* 2.6*   < >  --    < >  --    ALKPHOS 95 115   < >  --    < >  --    AST 23 29   < >  --    < >  --    ALT 52* 66*   < >  --    < >  --     < > = values in this interval not displayed.     CBC  Recent Labs   Lab 08/28/19  0700 08/27/19  0610   HGB 8.8* 8.6*   WBC 6.7 8.1   PLT 83* 70*     COAGS  Recent Labs   Lab 08/25/19  0258 08/24/19  0401   INR 1.19* 1.25*

## 2019-08-29 ENCOUNTER — APPOINTMENT (OUTPATIENT)
Dept: GENERAL RADIOLOGY | Facility: CLINIC | Age: 59
DRG: 005 | End: 2019-08-29
Attending: PHYSICIAN ASSISTANT
Payer: COMMERCIAL

## 2019-08-29 ENCOUNTER — APPOINTMENT (OUTPATIENT)
Dept: OCCUPATIONAL THERAPY | Facility: CLINIC | Age: 59
DRG: 005 | End: 2019-08-29
Payer: COMMERCIAL

## 2019-08-29 ENCOUNTER — APPOINTMENT (OUTPATIENT)
Dept: PHYSICAL THERAPY | Facility: CLINIC | Age: 59
DRG: 005 | End: 2019-08-29
Payer: COMMERCIAL

## 2019-08-29 LAB
ALBUMIN SERPL-MCNC: 2 G/DL (ref 3.4–5)
ALP SERPL-CCNC: 121 U/L (ref 40–150)
ALT SERPL W P-5'-P-CCNC: 43 U/L (ref 0–50)
ANION GAP SERPL CALCULATED.3IONS-SCNC: 6 MMOL/L (ref 3–14)
AST SERPL W P-5'-P-CCNC: 22 U/L (ref 0–45)
BACTERIA SPEC CULT: NO GROWTH
BASOPHILS # BLD AUTO: 0 10E9/L (ref 0–0.2)
BASOPHILS NFR BLD AUTO: 0.2 %
BILIRUB DIRECT SERPL-MCNC: 1.3 MG/DL (ref 0–0.2)
BILIRUB SERPL-MCNC: 1.8 MG/DL (ref 0.2–1.3)
BUN SERPL-MCNC: 30 MG/DL (ref 7–30)
CALCIUM SERPL-MCNC: 8.5 MG/DL (ref 8.5–10.1)
CHLORIDE SERPL-SCNC: 106 MMOL/L (ref 94–109)
CO2 SERPL-SCNC: 26 MMOL/L (ref 20–32)
CREAT SERPL-MCNC: 1.79 MG/DL (ref 0.52–1.04)
DIFFERENTIAL METHOD BLD: ABNORMAL
EOSINOPHIL # BLD AUTO: 0.1 10E9/L (ref 0–0.7)
EOSINOPHIL NFR BLD AUTO: 1.4 %
ERYTHROCYTE [DISTWIDTH] IN BLOOD BY AUTOMATED COUNT: 19.1 % (ref 10–15)
GFR SERPL CREATININE-BSD FRML MDRD: 30 ML/MIN/{1.73_M2}
GLUCOSE BLDC GLUCOMTR-MCNC: 97 MG/DL (ref 70–99)
GLUCOSE SERPL-MCNC: 98 MG/DL (ref 70–99)
HCT VFR BLD AUTO: 27.4 % (ref 35–47)
HGB BLD-MCNC: 8.5 G/DL (ref 11.7–15.7)
IMM GRANULOCYTES # BLD: 0.1 10E9/L (ref 0–0.4)
IMM GRANULOCYTES NFR BLD: 1.5 %
LYMPHOCYTES # BLD AUTO: 0.3 10E9/L (ref 0.8–5.3)
LYMPHOCYTES NFR BLD AUTO: 5.1 %
Lab: NORMAL
MAGNESIUM SERPL-MCNC: 1.9 MG/DL (ref 1.6–2.3)
MCH RBC QN AUTO: 31 PG (ref 26.5–33)
MCHC RBC AUTO-ENTMCNC: 31 G/DL (ref 31.5–36.5)
MCV RBC AUTO: 100 FL (ref 78–100)
MONOCYTES # BLD AUTO: 0.7 10E9/L (ref 0–1.3)
MONOCYTES NFR BLD AUTO: 11.3 %
NEUTROPHILS # BLD AUTO: 4.8 10E9/L (ref 1.6–8.3)
NEUTROPHILS NFR BLD AUTO: 80.5 %
NRBC # BLD AUTO: 0 10*3/UL
NRBC BLD AUTO-RTO: 0 /100
PHOSPHATE SERPL-MCNC: 4.9 MG/DL (ref 2.5–4.5)
PLATELET # BLD AUTO: 91 10E9/L (ref 150–450)
POTASSIUM SERPL-SCNC: 4.5 MMOL/L (ref 3.4–5.3)
PROT SERPL-MCNC: 4.3 G/DL (ref 6.8–8.8)
RBC # BLD AUTO: 2.74 10E12/L (ref 3.8–5.2)
SODIUM SERPL-SCNC: 137 MMOL/L (ref 133–144)
SPECIMEN SOURCE: NORMAL
TACROLIMUS BLD-MCNC: 14.3 UG/L (ref 5–15)
TME LAST DOSE: NORMAL H
WBC # BLD AUTO: 5.9 10E9/L (ref 4–11)

## 2019-08-29 PROCEDURE — 25000132 ZZH RX MED GY IP 250 OP 250 PS 637: Performed by: PHYSICIAN ASSISTANT

## 2019-08-29 PROCEDURE — 97530 THERAPEUTIC ACTIVITIES: CPT | Mod: GP

## 2019-08-29 PROCEDURE — 97140 MANUAL THERAPY 1/> REGIONS: CPT | Mod: GO

## 2019-08-29 PROCEDURE — 80053 COMPREHEN METABOLIC PANEL: CPT | Performed by: TRANSPLANT SURGERY

## 2019-08-29 PROCEDURE — 25000131 ZZH RX MED GY IP 250 OP 636 PS 637: Performed by: PHYSICIAN ASSISTANT

## 2019-08-29 PROCEDURE — 97530 THERAPEUTIC ACTIVITIES: CPT | Mod: GO

## 2019-08-29 PROCEDURE — 12000026 ZZH R&B TRANSPLANT

## 2019-08-29 PROCEDURE — 85025 COMPLETE CBC W/AUTO DIFF WBC: CPT | Performed by: TRANSPLANT SURGERY

## 2019-08-29 PROCEDURE — 83735 ASSAY OF MAGNESIUM: CPT | Performed by: TRANSPLANT SURGERY

## 2019-08-29 PROCEDURE — 74018 RADEX ABDOMEN 1 VIEW: CPT

## 2019-08-29 PROCEDURE — 27210429 ZZH NUTRITION PRODUCT INTERMEDIATE LITER

## 2019-08-29 PROCEDURE — 84100 ASSAY OF PHOSPHORUS: CPT | Performed by: TRANSPLANT SURGERY

## 2019-08-29 PROCEDURE — 82248 BILIRUBIN DIRECT: CPT | Performed by: TRANSPLANT SURGERY

## 2019-08-29 PROCEDURE — 25000132 ZZH RX MED GY IP 250 OP 250 PS 637: Performed by: SURGERY

## 2019-08-29 PROCEDURE — 00000146 ZZHCL STATISTIC GLUCOSE BY METER IP

## 2019-08-29 PROCEDURE — 80197 ASSAY OF TACROLIMUS: CPT | Performed by: TRANSPLANT SURGERY

## 2019-08-29 PROCEDURE — 36592 COLLECT BLOOD FROM PICC: CPT | Performed by: TRANSPLANT SURGERY

## 2019-08-29 PROCEDURE — 25000132 ZZH RX MED GY IP 250 OP 250 PS 637: Performed by: TRANSPLANT SURGERY

## 2019-08-29 RX ORDER — VALGANCICLOVIR HYDROCHLORIDE 50 MG/ML
450 POWDER, FOR SOLUTION ORAL EVERY OTHER DAY
Status: DISCONTINUED | OUTPATIENT
Start: 2019-08-29 | End: 2019-09-02

## 2019-08-29 RX ORDER — VALGANCICLOVIR 450 MG/1
450 TABLET, FILM COATED ORAL EVERY OTHER DAY
Status: DISCONTINUED | OUTPATIENT
Start: 2019-08-29 | End: 2019-09-02

## 2019-08-29 RX ORDER — TACROLIMUS 1 MG/1
1 CAPSULE ORAL
Status: DISCONTINUED | OUTPATIENT
Start: 2019-08-29 | End: 2019-09-02

## 2019-08-29 RX ADMIN — OXYCODONE HYDROCHLORIDE 5 MG: 5 TABLET ORAL at 02:01

## 2019-08-29 RX ADMIN — MELATONIN 1000 UNITS: at 21:17

## 2019-08-29 RX ADMIN — MYCOPHENOLATE MOFETIL 750 MG: 200 POWDER, FOR SUSPENSION ORAL at 18:52

## 2019-08-29 RX ADMIN — MYCOPHENOLATE MOFETIL 750 MG: 250 CAPSULE ORAL at 08:26

## 2019-08-29 RX ADMIN — FAMOTIDINE 20 MG: 20 TABLET ORAL at 08:26

## 2019-08-29 RX ADMIN — OXYCODONE HYDROCHLORIDE 5 MG: 5 TABLET ORAL at 21:17

## 2019-08-29 RX ADMIN — OXYCODONE HYDROCHLORIDE 5 MG: 5 TABLET ORAL at 14:58

## 2019-08-29 RX ADMIN — QUETIAPINE FUMARATE 25 MG: 25 TABLET ORAL at 21:17

## 2019-08-29 RX ADMIN — Medication 1 PACKET: at 21:18

## 2019-08-29 RX ADMIN — ASPIRIN 325 MG ORAL TABLET 325 MG: 325 PILL ORAL at 08:26

## 2019-08-29 RX ADMIN — SENNOSIDES AND DOCUSATE SODIUM 2 TABLET: 8.6; 5 TABLET ORAL at 21:17

## 2019-08-29 RX ADMIN — TACROLIMUS 1 MG: 1 CAPSULE ORAL at 18:52

## 2019-08-29 RX ADMIN — MELATONIN TAB 3 MG 3 MG: 3 TAB at 21:17

## 2019-08-29 RX ADMIN — LEVOTHYROXINE SODIUM 125 MCG: 0.12 TABLET ORAL at 08:26

## 2019-08-29 RX ADMIN — VALGANCICLOVIR HYDROCHLORIDE 450 MG: 50 POWDER, FOR SOLUTION ORAL at 18:52

## 2019-08-29 ASSESSMENT — ACTIVITIES OF DAILY LIVING (ADL)
ADLS_ACUITY_SCORE: 27
ADLS_ACUITY_SCORE: 28
ADLS_ACUITY_SCORE: 27
ADLS_ACUITY_SCORE: 27

## 2019-08-29 NOTE — PROGRESS NOTES
Calorie Count  Intake recorded for: 8/28  Total Kcals: 50 Total Protein: 0g  Kcals from Hospital Food: 50   Protein: 0g  Kcals from Outside Food (average):0 Protein: 0g  # Meals Recorded: 100% applesauce   # Supplements Recorded: 0

## 2019-08-29 NOTE — PROGRESS NOTES
CLINICAL NUTRITION SERVICES - BRIEF NOTE     Nutrition Prescription    RECOMMENDATIONS FOR MDs/PROVIDERS TO ORDER:  Monitor electrolytes over the next 5 days to monitor for refeeding.  Replace PRN.     Recommendations already ordered by Registered Dietitian (RD):  Continue Calorie Counts (8/30-9/2)    Nutren 1.5 @ 45 mL/hr + 2  pkts ProSource TF daily to provide 1700 kcals (27 kcal/kg/day), 95 g PRO (1.5 g/kg/day), 821 mL H2O, 190 g CHO and no Fiber daily.  Resume TF at 15 ml/hr with advancement by 10 ml/hr q 8 hours to goal rate of 45 ml/hr.        EVALUATION OF THE PROGRESS TOWARD GOALS      NEW FINDINGS   FT was unable to flush after placement yesterday.  TF did not start overnight.  FT was pulled slightly by patient, AXR completed today and FT location is appropriate in duodenum and now flushes appropriately.  Updated TF orders after discussion with provider/RN - will plan to start TF this afternoon.      Monitoring/Evaluation  Progress toward goals will be monitored and evaluated per protocol.    Lela Ng MS, RD, LD  Pager [phone]

## 2019-08-29 NOTE — PLAN OF CARE
Discharge Planner OT   Patient plan for discharge: Not stated.  Current status: Pt continues to be disoriented x 3, only oriented to self, facilitated reorientation strategies and techniques, pt with minimal carryover. Max A for LB dressing, pt progressing to mod A x 2 for sit <> stand with heavy mod A x 2 for steps with FWW from bed >chair. Max A x 2 for sit > supine.  Barriers to return to prior living situation: post-surgical precautions, pain, impaired ADLs, cognition, impaired mobility  Recommendations for discharge: TCU  Rationale for recommendations: Pt is currently below functional baseline for ADLs, transfers, and functional mobility, would benefit from continued therapy to address above deficits and maximize strength/independence in order to return to PLOF.         Entered by: Maribell Bowers 08/29/2019 2:14 PM     Edema/7A: Pt presenting with soft 2+ pitting throughout LEs, skin intact. Skin cares performed prior to reapplication of GCB with sween 24 from MTPs to knee creases for continued protection of skin integrity. Okay to wear x 48 hours until therapist returns. Please remove compression if causing numbness, tingling, pain, or becomes soiled.

## 2019-08-29 NOTE — PLAN OF CARE
Discharge Planner PT   Patient plan for discharge: TCU  Current status: Patient completes bed mobility with Rafa using log roll technique. Patient transferred to bedside chair with use of EZ stand - patient actively assisting with sit <> stands. Patient completes x3 sit <> stands from bedside chair with Rafa-modA x2 and standing for 15-30 seconds with Rafa and verbal cueing for upright posture. Patient endorsing abdominal pain with movement today. Patient on RA with 02 sats maintained around 92% with activity.     Nursing: PT continues to recommend use of overhead sling or parmjit lift for safety with all transfers. Patient should be up in chair at least 2x/day. Thank you :)     Barriers to return to prior living situation: medical status. Pain. Level of assist.  Recommendations for discharge: TCU  Rationale for recommendations: Patient would benefit from continued skilled PT intervention to progress strength, balance, activity tolerance and functional mobility.        Entered by: Carrie Faulkner 08/29/2019 12:17 PM

## 2019-08-29 NOTE — PROGRESS NOTES
[id number] Shift :   VSS, afebrile, on 2 L NL. A/Ox2; disoriented to time and situation. Pt in mitts and VPM and bed alarm remain on.  Assist x2 for turn and repositioning. PRN Oxycodone given for pain. Clamshell incision with sutures; ecchymotic. NJ unable to flush tube, M.D. Aware. R internal jugular with MIVF @ 50mL/hr. L OBED with 75mL serosanguinous output. R OBED with scant mL serosanguinous output. Purewick patent of urine output.  Will continue to monitor closely and notify the team of any acute changes.

## 2019-08-29 NOTE — PROGRESS NOTES
Transplant Surgery  Inpatient Daily Progress Note  08/29/2019    Assessment & Plan: Nicci Pemberton is a 58 yo female with a past medical history significant for end stage liver disease 2/2 ANGULO and alpha 1 anti-trypsin deficiency now s/p DD OLT on 8/18/19    Graft function: Good, AST and ALT now trending down or stable. Tbili 2.6 (2.8). R drain output decreasing, L drain output still high. Will plan to remove right OBED today   Immunosuppression management:  mg BID, Tac dose currently 4 mg BID, levels ordered every 48 hours. 8/25 level 5.2. 8/27 level high at 15.5, 1 mg given 8/27 PM and dose decreased to 3 mg BID. Tac level 8/28 high at 17.6. HELD dose and level pending for today. Will resume dosing based on today's level. Stopped pred 5mg daily now that tac level therapeutic. Complexity of management: Medium. Contributing factors: anemia and induction  Hematology: Acute blood loss anemia. Transfusion goal hgb > 7. Hgb stable at this time. Last transfused 3 units 8/20 overnight.  HEENT: Post-op nasal bleeding, examination revealed no direct lesion, overall friable mucosa. Re-packed 8/21. Re-bleeding with feeding tube placement 8/22, ENT re-packed around tube. Now stable. Re-advanced feeding tube 8/28, patient partially dislodged overnight. Will recheck placement this AM.   Cardiorespiratory: Patient extubated 8/24  Hx of hypertension on spironolactone and bumex at home. BPs stable.  Neuro: Toxic and metabolic encephalopathy likely secondary to poor sleep, drugs, acute illness. Ordered melatonin and bedtime seroquel to help patient sleep overnight, so far patient has seemed more oriented this morning, will continue to assess throughout the day.  GI/Nutrition: NJ placement attempted 8/22, has been difficult placement due to kinking and nasal bleeding, and patient has removed/dislodged tubes while confused. Pt passed swallow eval, diet ordered but minimal intake. Will get XR to recheck placement, will start TPN  later if not able to start enteral feeds.  Endocrine: Steroid induced hyperglycemia, resolving, on sliding scale insulin.  Fluid/Electrolytes: GAMAL- CRRT stopped patient will get HD as needed, so far no indication. 925 ml UOP plus some incontinent episodes. Cr 1.8< 1.6<-1.4<-1.1<-0.9. Edematous.   : Purewick catheter  Infectious disease: Ppx with micafungin x 10 days completed, zosyn completed.  Prophylaxis: DVT, fall, GI, fungal  Disposition: 7A     Medical Decision Making: Medium  Subsequent visit 76797 (moderate level decision making)    VEENA/Fellow/Resident Provider: Mary Braxton PA-C [id number]    Faculty: Hui Cross M.D.    __________________________________________________________________  Transplant History: Admitted 8/16/2019 for acute decompensated ANGULO.   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    8/18/2019 (Liver), Postoperative day: 11     Interval History:   Appears more alert and oriented so far this AM. NJ tube not flushing overnight, now flushing, but it appears that tube was partially dislodged overnight. Will recheck placement this AM.     ROS:   A 10-point review of systems was negative except as noted above.    Curent Meds:    aspirin  325 mg Oral Daily     carboxymethylcellulose PF  2 drop Both Eyes Q6H     dapsone  100 mg Oral Daily     famotidine  20 mg Oral Daily     levothyroxine  125 mcg Oral Daily     melatonin  3 mg Oral At Bedtime     multivitamins w/minerals  15 mL Per Feeding Tube Daily     mycophenolate  750 mg Oral BID IS    Or     mycophenolate  750 mg Oral or NG Tube BID IS     polyethylene glycol  17 g Oral BID     protein modular  1 packet Per Feeding Tube BID     QUEtiapine  25 mg Oral At Bedtime     senna-docusate  2 tablet Oral BID     sodium chloride  1 spray Both Nostrils Q4H     sodium chloride (PF)  3 mL Intravenous Q8H     sodium chloride (PF)  3 mL Intracatheter Q8H     valGANciclovir  450 mg Oral Once per day on Mon Fri    Or      valGANciclovir  450 mg Oral or NG Tube Once per day on Mon Fri     vitamin D3  1,000 Units Oral BID       Physical Exam:     Admit Weight: 104.3 kg (230 lb)    Current Vitals:   /64 (BP Location: Right arm)   Pulse 90   Temp 97.4  F (36.3  C) (Oral)   Resp 18   Wt 98.2 kg (216 lb 7.9 oz)   SpO2 93%   BMI 39.60 kg/m      Vital sign ranges:    Temp:  [97.4  F (36.3  C)-98.1  F (36.7  C)] 97.4  F (36.3  C)  Heart Rate:  [88-94] 89  Resp:  [16-18] 18  BP: (105-123)/(64-81) 105/64  SpO2:  [88 %-96 %] 93 %  Patient Vitals for the past 24 hrs:   BP Temp Temp src Heart Rate Resp SpO2   08/29/19 0728 105/64 97.4  F (36.3  C) Oral 89 18 93 %   08/29/19 0715 -- -- -- -- -- (!) 88 %   08/29/19 0046 115/74 98.1  F (36.7  C) Axillary 88 18 96 %   08/28/19 1926 123/81 98.1  F (36.7  C) Oral 88 16 92 %   08/28/19 1539 120/75 97.5  F (36.4  C) Oral 90 16 95 %   08/28/19 1132 111/74 97.4  F (36.3  C) Oral 94 16 95 %     General Appearance: Awake, alert, arousable  HEENT: Feeding tube in nare  Skin: normal, warm, scattered bruising  Heart: NSR  Lungs: NLB on 2 L oxygen.   Abdomen: soft, nondistended, incision sutured, c/d/i. JPs with serosanguinous output  : External catheter in place  Extremities: edema: present bilaterally. 3+.  Neurologic: Alert, asking appropriate questions    Frailty Scores     There is no flowsheet data to display.          Data:   CMP  Recent Labs   Lab 08/29/19  0537 08/28/19  0700  08/24/19  1456  08/24/19  1015    138   < >  --    < >  --    POTASSIUM 4.5 4.4   < >  --    < >  --    CHLORIDE 106 105   < >  --    < >  --    CO2 26 27   < >  --    < >  --    GLC 98 92   < >  --    < >  --    BUN 30 31*   < >  --    < >  --    CR 1.79* 1.65*   < >  --    < >  --    GFRESTIMATED 30* 34*   < >  --    < >  --    GFRESTBLACK 35* 39*   < >  --    < >  --    JACOB 8.5 8.7   < >  --    < >  --    ICAW  --   --   --  4.8  --  4.5   MAG 1.9 2.1   < >  --    < >  --    PHOS 4.9* 4.7*   < >  --    <  >  --    ALBUMIN 2.0* 2.0*   < >  --    < >  --    BILITOTAL 1.8* 2.3*   < >  --    < >  --    ALKPHOS 121 95   < >  --    < >  --    AST 22 23   < >  --    < >  --    ALT 43 52*   < >  --    < >  --     < > = values in this interval not displayed.     CBC  Recent Labs   Lab 08/29/19  0537 08/28/19  0700   HGB 8.5* 8.8*   WBC 5.9 6.7   PLT 91* 83*     COAGS  Recent Labs   Lab 08/25/19  0258 08/24/19  0401   INR 1.19* 1.25*

## 2019-08-29 NOTE — PROGRESS NOTES
Transplant Social Work Services Progress Note      Date of Initial Social Work Evaluation: 4/25/18  Collaborated with: Liver Transplant Team    Data: Nicci received a liver transplant on 8/18/19.    Intervention: Reviewed PT/OT notes.  I met briefly with patient and provided support.  Assessment: PT/OT continue to recommend TCU placement.  My visit with patient was brief due to her fatigue and mental status.  Education provided by SW: Liver Transplant Support Group  Plan:    Discharge Plans in Progress: [facility] admissions are following (discussed with patient's  last week).    Barriers to d/c plan: medical stability    Follow up Plan: I will remain available for the psychosocial needs of this patient and family.      ARIEL Valerio, [facility]  Liver Transplant   Phone [phone]  Pager [phone]

## 2019-08-29 NOTE — PLAN OF CARE
Vitals: Vitals stable, 88% sats on room air, 97% on 2L per NC.  Endocrine: n/a, tube feeding on hold.  Labs: Improving liver enzymes, creatinine increasing slightly.  Pain: Generalized and abdominal pain, repositioned with some relief.  PRN's: n/a  Diet: Regular diet, poor appetite, 1/2 Boost this morning.   LDA: R/L OBED, Pure wick in place, NJ, 3/4 pulled out overnight but was occuluded, not using until xray completed.  GI: Incontinent stool X1 in diaper.  : Pure wick in place, new one placed at 0900.  Skin: Bruised, jaundiced, 2 Primapore dressings intact on right arm and groin. Coccyx is intact, skin is clean and intact, no redness noted.  Neuro: Improving mentation, still confused, conversation making more sense, disoriented to situation, time and place. VPM in place, has not pulled on lines or tubes, family in the room this morning.    Mobility: Assist of 2 to the chair using Easy Stand, needs mechanical lift back into bed. Patient had a lot of difficulty attempting to brush her teeth, could not coordinate her hands.  Education: Medication card updated, no education with patient due to confusion.  Plan: Encourage food intake, abdominal xray to evaluate NJ. Patient was able to take medications with apple sauce and sips of water.   1445: NJ OK to use for tube feeding and medications.

## 2019-08-29 NOTE — PLAN OF CARE
/81 (BP Location: Right arm)   Pulse 90   Temp 98.1  F (36.7  C) (Oral)   Resp 16   Wt 98.2 kg (216 lb 7.9 oz)   SpO2 92%   BMI 39.60 kg/m      8035-3074: A/Ox2; disoriented to time and situation. Pt in soft restraints until 1800. Pt less agitated this evening, restraints were removed at this time and pt was transitioned to mitts. VPM and bed alarm remain on. Seroquel and melatonin started tonight. VSS on 2L O2. Regular diet + calorie counts; refused dinner despite encouragement. Assist x2 for turn and repositioning. Plan for lift room when one becomes available. Denies pain; difficult to assess due to confusion, but patient does not have any non-verbal signs of discomfort. Denies nausea. Clamshell incision with sutures; ecchymotic. NJ manipulated on day shift; unable to flush tube. Liver team was made aware; unable to initiate tube feeds. R internal jugular with MIVF @ 50mL/hr. L OBED with 250mL serosanguinous output. R OBED with 75mL serosanguinous output. Purewick with 525mL urine output. Incontinent of stool x1. Tac dose supratherapeutic today; evening dose was held per team. Patient's  and mother visited the bedside this evening. Med card and lab book were made, but have not yet been reviewed with patient or family. Will continue to monitor closely and notify the team of any acute changes.

## 2019-08-30 ENCOUNTER — APPOINTMENT (OUTPATIENT)
Dept: OCCUPATIONAL THERAPY | Facility: CLINIC | Age: 59
DRG: 005 | End: 2019-08-30
Payer: COMMERCIAL

## 2019-08-30 ENCOUNTER — APPOINTMENT (OUTPATIENT)
Dept: PHYSICAL THERAPY | Facility: CLINIC | Age: 59
DRG: 005 | End: 2019-08-30
Payer: COMMERCIAL

## 2019-08-30 PROBLEM — N17.9 ACUTE KIDNEY FAILURE, UNSPECIFIED (H): Status: ACTIVE | Noted: 2019-08-30

## 2019-08-30 LAB
ALBUMIN SERPL-MCNC: 1.9 G/DL (ref 3.4–5)
ALP SERPL-CCNC: 127 U/L (ref 40–150)
ALT SERPL W P-5'-P-CCNC: 40 U/L (ref 0–50)
ANION GAP SERPL CALCULATED.3IONS-SCNC: 6 MMOL/L (ref 3–14)
AST SERPL W P-5'-P-CCNC: 22 U/L (ref 0–45)
BASOPHILS # BLD AUTO: 0 10E9/L (ref 0–0.2)
BASOPHILS NFR BLD AUTO: 0.2 %
BILIRUB DIRECT SERPL-MCNC: 1.2 MG/DL (ref 0–0.2)
BILIRUB SERPL-MCNC: 1.6 MG/DL (ref 0.2–1.3)
BUN SERPL-MCNC: 28 MG/DL (ref 7–30)
CALCIUM SERPL-MCNC: 8.6 MG/DL (ref 8.5–10.1)
CHLORIDE SERPL-SCNC: 104 MMOL/L (ref 94–109)
CO2 SERPL-SCNC: 26 MMOL/L (ref 20–32)
CREAT SERPL-MCNC: 1.75 MG/DL (ref 0.52–1.04)
DIFFERENTIAL METHOD BLD: ABNORMAL
EOSINOPHIL # BLD AUTO: 0.1 10E9/L (ref 0–0.7)
EOSINOPHIL NFR BLD AUTO: 1.6 %
ERYTHROCYTE [DISTWIDTH] IN BLOOD BY AUTOMATED COUNT: 18.8 % (ref 10–15)
GFR SERPL CREATININE-BSD FRML MDRD: 31 ML/MIN/{1.73_M2}
GLUCOSE BLDC GLUCOMTR-MCNC: 116 MG/DL (ref 70–99)
GLUCOSE BLDC GLUCOMTR-MCNC: 130 MG/DL (ref 70–99)
GLUCOSE SERPL-MCNC: 122 MG/DL (ref 70–99)
HCT VFR BLD AUTO: 29.1 % (ref 35–47)
HGB BLD-MCNC: 8.6 G/DL (ref 11.7–15.7)
IMM GRANULOCYTES # BLD: 0.1 10E9/L (ref 0–0.4)
IMM GRANULOCYTES NFR BLD: 1.4 %
LYMPHOCYTES # BLD AUTO: 0.4 10E9/L (ref 0.8–5.3)
LYMPHOCYTES NFR BLD AUTO: 7.3 %
MAGNESIUM SERPL-MCNC: 1.8 MG/DL (ref 1.6–2.3)
MCH RBC QN AUTO: 29.8 PG (ref 26.5–33)
MCHC RBC AUTO-ENTMCNC: 29.6 G/DL (ref 31.5–36.5)
MCV RBC AUTO: 101 FL (ref 78–100)
MONOCYTES # BLD AUTO: 0.6 10E9/L (ref 0–1.3)
MONOCYTES NFR BLD AUTO: 11.8 %
NEUTROPHILS # BLD AUTO: 3.8 10E9/L (ref 1.6–8.3)
NEUTROPHILS NFR BLD AUTO: 77.7 %
NRBC # BLD AUTO: 0 10*3/UL
NRBC BLD AUTO-RTO: 0 /100
PHOSPHATE SERPL-MCNC: 4.6 MG/DL (ref 2.5–4.5)
PLATELET # BLD AUTO: 107 10E9/L (ref 150–450)
POTASSIUM SERPL-SCNC: 4.4 MMOL/L (ref 3.4–5.3)
PROT SERPL-MCNC: 4.3 G/DL (ref 6.8–8.8)
RBC # BLD AUTO: 2.89 10E12/L (ref 3.8–5.2)
SODIUM SERPL-SCNC: 137 MMOL/L (ref 133–144)
WBC # BLD AUTO: 4.9 10E9/L (ref 4–11)

## 2019-08-30 PROCEDURE — 25000132 ZZH RX MED GY IP 250 OP 250 PS 637: Performed by: PHYSICIAN ASSISTANT

## 2019-08-30 PROCEDURE — 97535 SELF CARE MNGMENT TRAINING: CPT | Mod: GO

## 2019-08-30 PROCEDURE — 27210429 ZZH NUTRITION PRODUCT INTERMEDIATE LITER

## 2019-08-30 PROCEDURE — 12000026 ZZH R&B TRANSPLANT

## 2019-08-30 PROCEDURE — 25800025 ZZH RX 258: Performed by: PHYSICIAN ASSISTANT

## 2019-08-30 PROCEDURE — 36592 COLLECT BLOOD FROM PICC: CPT | Performed by: TRANSPLANT SURGERY

## 2019-08-30 PROCEDURE — 97530 THERAPEUTIC ACTIVITIES: CPT | Mod: GP

## 2019-08-30 PROCEDURE — 82248 BILIRUBIN DIRECT: CPT | Performed by: TRANSPLANT SURGERY

## 2019-08-30 PROCEDURE — 97530 THERAPEUTIC ACTIVITIES: CPT | Mod: GO

## 2019-08-30 PROCEDURE — 83735 ASSAY OF MAGNESIUM: CPT | Performed by: TRANSPLANT SURGERY

## 2019-08-30 PROCEDURE — 25000132 ZZH RX MED GY IP 250 OP 250 PS 637: Performed by: SURGERY

## 2019-08-30 PROCEDURE — 80053 COMPREHEN METABOLIC PANEL: CPT | Performed by: TRANSPLANT SURGERY

## 2019-08-30 PROCEDURE — 85025 COMPLETE CBC W/AUTO DIFF WBC: CPT | Performed by: TRANSPLANT SURGERY

## 2019-08-30 PROCEDURE — 00000146 ZZHCL STATISTIC GLUCOSE BY METER IP

## 2019-08-30 PROCEDURE — 84100 ASSAY OF PHOSPHORUS: CPT | Performed by: TRANSPLANT SURGERY

## 2019-08-30 PROCEDURE — 25000131 ZZH RX MED GY IP 250 OP 636 PS 637: Performed by: PHYSICIAN ASSISTANT

## 2019-08-30 RX ORDER — QUETIAPINE FUMARATE 25 MG/1
25 TABLET, FILM COATED ORAL
Status: DISCONTINUED | OUTPATIENT
Start: 2019-08-30 | End: 2019-09-05

## 2019-08-30 RX ORDER — TRAMADOL HYDROCHLORIDE 50 MG/1
50 TABLET ORAL EVERY 6 HOURS PRN
Status: DISCONTINUED | OUTPATIENT
Start: 2019-08-30 | End: 2019-09-10

## 2019-08-30 RX ORDER — LIDOCAINE 4 G/G
1 PATCH TOPICAL
Status: DISCONTINUED | OUTPATIENT
Start: 2019-08-30 | End: 2019-08-30

## 2019-08-30 RX ORDER — LIDOCAINE 50 MG/G
OINTMENT TOPICAL EVERY 4 HOURS PRN
Status: DISCONTINUED | OUTPATIENT
Start: 2019-08-30 | End: 2019-09-23 | Stop reason: HOSPADM

## 2019-08-30 RX ADMIN — SALINE NASAL SPRAY 1 SPRAY: 1.5 SOLUTION NASAL at 09:13

## 2019-08-30 RX ADMIN — POLYETHYLENE GLYCOL 3350 17 G: 17 POWDER, FOR SOLUTION ORAL at 09:14

## 2019-08-30 RX ADMIN — TACROLIMUS 1 MG: 1 CAPSULE ORAL at 09:14

## 2019-08-30 RX ADMIN — MELATONIN 1000 UNITS: at 09:14

## 2019-08-30 RX ADMIN — LEVOTHYROXINE SODIUM 125 MCG: 0.12 TABLET ORAL at 09:14

## 2019-08-30 RX ADMIN — MYCOPHENOLATE MOFETIL 750 MG: 250 CAPSULE ORAL at 18:10

## 2019-08-30 RX ADMIN — ASPIRIN 325 MG ORAL TABLET 325 MG: 325 PILL ORAL at 09:13

## 2019-08-30 RX ADMIN — FAMOTIDINE 20 MG: 20 TABLET ORAL at 09:13

## 2019-08-30 RX ADMIN — CLOTRIMAZOLE 1 TROCHE: 10 LOZENGE ORAL at 09:13

## 2019-08-30 RX ADMIN — Medication 1 PACKET: at 09:15

## 2019-08-30 RX ADMIN — CLOTRIMAZOLE 1 TROCHE: 10 LOZENGE ORAL at 18:10

## 2019-08-30 RX ADMIN — CLOTRIMAZOLE 1 TROCHE: 10 LOZENGE ORAL at 11:13

## 2019-08-30 RX ADMIN — MELATONIN TAB 3 MG 3 MG: 3 TAB at 20:29

## 2019-08-30 RX ADMIN — MULTIVITAMIN 15 ML: LIQUID ORAL at 09:13

## 2019-08-30 RX ADMIN — TACROLIMUS 1 MG: 1 CAPSULE ORAL at 18:10

## 2019-08-30 RX ADMIN — DEXTROSE AND SODIUM CHLORIDE: 5; 450 INJECTION, SOLUTION INTRAVENOUS at 04:43

## 2019-08-30 RX ADMIN — Medication 100 MG: at 09:14

## 2019-08-30 RX ADMIN — MELATONIN 1000 UNITS: at 20:29

## 2019-08-30 RX ADMIN — Medication 1 PACKET: at 20:29

## 2019-08-30 RX ADMIN — TRAMADOL HYDROCHLORIDE 50 MG: 50 TABLET, COATED ORAL at 11:13

## 2019-08-30 RX ADMIN — SENNOSIDES AND DOCUSATE SODIUM 2 TABLET: 8.6; 5 TABLET ORAL at 09:13

## 2019-08-30 RX ADMIN — MYCOPHENOLATE MOFETIL 750 MG: 250 CAPSULE ORAL at 09:13

## 2019-08-30 ASSESSMENT — ACTIVITIES OF DAILY LIVING (ADL)
ADLS_ACUITY_SCORE: 27
ADLS_ACUITY_SCORE: 26
ADLS_ACUITY_SCORE: 26
ADLS_ACUITY_SCORE: 27
ADLS_ACUITY_SCORE: 26
ADLS_ACUITY_SCORE: 27

## 2019-08-30 NOTE — PLAN OF CARE
Discharge Planner OT   Patient plan for discharge: Not discussed this session.  Current status: Pt completed bed mobility with Mod Ax2. Pt completed 2 STS and 1 SPT transfer to bedside chair with Mod Ax2 and verbal cueing for safety and technique. Pt completed seated ADL routine with verbal cueing and SBA-mod Ax1. Pt remains lethargic and confused. Oriented to self and day only.  Barriers to return to prior living situation: Medical status, cognition, pain, post surgical precautions.  Recommendations for discharge: TCU  Rationale for recommendations: Pt is currently below functional baseline for ADLs, transfers, and functional mobility, would benefit from continued therapy to address above deficits and maximize strength/independence in order to return to PLOF.        Entered by: Angelo Menchaca 08/30/2019 4:59 PM

## 2019-08-30 NOTE — PLAN OF CARE
/80 (BP Location: Right arm)   Pulse 92   Temp 98.2  F (36.8  C) (Oral)   Resp 16   Wt 94.5 kg (208 lb 5.4 oz)   SpO2 95%   BMI 38.10 kg/m       8599-5598  AVSS on  2-3L of 02. Patient given PRN oxy for pain throughout evening shift but became combative throughout night due; there has been a pattern identified with this behavior and oxy. If patient does not need it; please try to stay away from oxy due to this reaction. Denies nausea. Regular diet; calorie count. Poor appetite and PO intake. TF @ 25 ml/hr; goal rate of 45 ml/hr; increase by 10 Q 8hours into NJ. Patient tolerating well. BM each shift; incontinent. Purewick in place for urine; good amount. Patient had restraints on from 8258-8024 this AM; but has had them off since 0445 due deescalating behavior and complying with requests. Aniket; good amount of output. Continue plan of care; please notify MD with any changes.

## 2019-08-30 NOTE — PROGRESS NOTES
Calorie Count  Intake recorded for: 8/29  Total Kcals: 180 Total Protein: 7g  Kcals from Hospital Food: 180   Protein: 7g  Kcals from Outside Food (average):0  Protein: 0g  # Meals Recorded: 1 meal ordered from kitchen, no food intake recorded.  # Supplements Recorded: 50% 1 Boost Plus.

## 2019-08-30 NOTE — PLAN OF CARE
Discharge Planner PT  7A  Patient plan for discharge: Not stated  Current status: Limited progression in mobility this date 2/2 fatigue, lethargy, and confusion. Also noted increased tremors/shakiness this date. Pt performs supine<>sit with max-modAx2, total A for repositioning in bed. Sit<>stand performed x3 with modAx2 and FWW, frequent cues for upright posture and hand placement. Pt tolerates standing ~2 minutes this date.  Barriers to return to prior living situation: Medical status, abdominal precautions, weakness, level of assist for mobility, impaired cognition, decreased activity tolerance  Recommendations for discharge: TCU  Rationale for recommendations: Pt significantly below baseline in regards to mobility, would benefit from continued skilled rehab services to progress strength, endurance, balance, and safety and independence with functional mobility.       Entered by: Naida Khalil 08/30/2019 9:37 AM

## 2019-08-30 NOTE — PROGRESS NOTES
Transplant Surgery  Inpatient Daily Progress Note  08/30/2019    Assessment & Plan: Nicci Pemberton is a 60 yo female with a past medical history significant for end stage liver disease 2/2 ANGULO and alpha 1 anti-trypsin deficiency now s/p DD OLT on 8/18/19    Graft function: Good, AST and ALT now trending down or stable. Tbili improved to 1.6. R drain removed, L drain output still high, will leave at this time.  Immunosuppression management:  mg BID, Tac goal ~10. Level 8/28 high at 17.6. HELD dose. Resumed dosing 8/29 at 1 mg BID. Will follow up level tomorrow. Stopped pred 5mg daily now that tac level therapeutic. Complexity of management: Medium. Contributing factors: anemia and induction  Hematology: Acute blood loss anemia. Transfusion goal hgb > 7. Hgb stable at this time. Last transfused 3 units 8/20 overnight.  HEENT: Post-op nasal bleeding, examination revealed no direct lesion, overall friable mucosa. Re-packed 8/21. Re-bleeding with feeding tube placement 8/22, ENT re-packed around tube. Now stable. Re-advanced feeding tube 8/28, currently in good position.  Cardiorespiratory: Patient extubated 8/24  Hx of hypertension on spironolactone and bumex at home. BPs stable.  Neuro: Toxic and metabolic encephalopathy likely secondary to poor sleep, drugs (tac, pain medication), acute illness. Ordered melatonin and bedtime seroquel to help patient sleep, patient's mental status has been improving overall. Per nursing, did get more agitated after oxycodone dose overnight, will discontinue oxycodone and order tramadol PRN. Lidoderm patches also ordered. Will change seroquel to PRN at bedtime. Continue scheduled melatonin.  GI/Nutrition: NJ now in good position, tube feeds started.  Pt passed swallow eval, diet ordered but minimal intake.   Endocrine: Steroid induced hyperglycemia, resolving, on sliding scale insulin.  Fluid/Electrolytes: GAMAL- CRRT stopped patient will get HD as needed, so far no indication.  Incontinent of urine, so difficult to quantify, but Cr now plateaued, 1.8 (1.8) 1.8< 1.6<-1.4<-1.1<-0.9 (post dialysis). Edematous.   : Incontinent  Infectious disease: Ppx with micafungin x 10 days completed, zosyn completed.  Prophylaxis: DVT, fall, GI, fungal  Disposition: 7A     Medical Decision Making: Medium  Subsequent visit 63706 (moderate level decision making)    VEENA/Fellow/Resident Provider: Mary Braxton PA-C [id number]    Faculty: Hui Cross M.D.    ________________________________________________________________  Transplant History: Admitted 8/16/2019 for acute decompensated ANGULO.   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    8/18/2019 (Liver), Postoperative day: 12     Interval History:   Alert and oriented this AM, intermittently confused still though greatly improved from before.     ROS:   A 10-point review of systems was negative except as noted above.    Curent Meds:    aspirin  325 mg Oral Daily     carboxymethylcellulose PF  2 drop Both Eyes Q6H     clotrimazole  1 Molly Buccal 4x Daily     dapsone  100 mg Oral Daily     famotidine  20 mg Oral Daily     levothyroxine  125 mcg Oral Daily     lidocaine  1 patch Transdermal Q24H     lidocaine   Transdermal Q8H     lidocaine   Transdermal Q24h     melatonin  3 mg Oral At Bedtime     multivitamins w/minerals  15 mL Per Feeding Tube Daily     mycophenolate  750 mg Oral BID IS    Or     mycophenolate  750 mg Oral or NG Tube BID IS     polyethylene glycol  17 g Oral BID     protein modular  1 packet Per Feeding Tube BID     QUEtiapine  25 mg Oral At Bedtime     senna-docusate  2 tablet Oral BID     sodium chloride  1 spray Both Nostrils Q4H     sodium chloride (PF)  3 mL Intravenous Q8H     sodium chloride (PF)  3 mL Intracatheter Q8H     tacrolimus  1 mg Oral BID IS     valGANciclovir  450 mg Oral Every Other Day    Or     valGANciclovir  450 mg Oral or NG Tube Every Other Day     vitamin D3  1,000 Units  Oral BID       Physical Exam:     Admit Weight: 104.3 kg (230 lb)    Current Vitals:   /68 (BP Location: Left arm)   Pulse 92   Temp 98.2  F (36.8  C) (Axillary)   Resp 20   Wt 92 kg (202 lb 13.2 oz)   SpO2 92%   BMI 37.10 kg/m      Vital sign ranges:    Temp:  [97.5  F (36.4  C)-98.2  F (36.8  C)] 98.2  F (36.8  C)  Pulse:  [92] 92  Heart Rate:  [90-96] 94  Resp:  [16-20] 20  BP: (101-124)/(56-94) 101/68  SpO2:  [92 %-95 %] 92 %  Patient Vitals for the past 24 hrs:   BP Temp Temp src Pulse Heart Rate Resp SpO2 Weight   08/30/19 0910 -- -- -- -- -- -- -- 92 kg (202 lb 13.2 oz)   08/30/19 0902 101/68 -- -- -- 94 -- 92 % --   08/30/19 0741 (!) 124/94 98.2  F (36.8  C) Axillary -- 96 20 95 % --   08/30/19 0318 115/80 98.2  F (36.8  C) Oral -- 91 16 95 % --   08/29/19 1907 111/56 98.2  F (36.8  C) Oral 92 -- 16 94 % --   08/29/19 1536 110/85 97.9  F (36.6  C) Oral -- 90 18 93 % --   08/29/19 1432 -- -- -- -- -- -- -- 94.5 kg (208 lb 5.4 oz)   08/29/19 1215 105/67 97.5  F (36.4  C) Oral -- 95 20 92 % --     General Appearance: Awake, alert, arousable  HEENT: Feeding tube in nare  Skin: normal, warm, scattered bruising  Heart: NSR  Lungs: NLB on RA   Abdomen: soft, nondistended, incision sutured, c/d/i. OBED x 1 with serosanguinous output  : Wearing depends  Extremities: edema: present bilaterally. 3+.  Neurologic: Alert, asking appropriate questions    Frailty Scores     There is no flowsheet data to display.          Data:   CMP  Recent Labs   Lab 08/30/19  0433 08/29/19  0537  08/24/19  1456  08/24/19  1015    137   < >  --    < >  --    POTASSIUM 4.4 4.5   < >  --    < >  --    CHLORIDE 104 106   < >  --    < >  --    CO2 26 26   < >  --    < >  --    * 98   < >  --    < >  --    BUN 28 30   < >  --    < >  --    CR 1.75* 1.79*   < >  --    < >  --    GFRESTIMATED 31* 30*   < >  --    < >  --    GFRESTBLACK 36* 35*   < >  --    < >  --    JACOB 8.6 8.5   < >  --    < >  --    ICAW  --   --   --   4.8  --  4.5   MAG 1.8 1.9   < >  --    < >  --    PHOS 4.6* 4.9*   < >  --    < >  --    ALBUMIN 1.9* 2.0*   < >  --    < >  --    BILITOTAL 1.6* 1.8*   < >  --    < >  --    ALKPHOS 127 121   < >  --    < >  --    AST 22 22   < >  --    < >  --    ALT 40 43   < >  --    < >  --     < > = values in this interval not displayed.     CBC  Recent Labs   Lab 08/30/19  0433 08/29/19  0537   HGB 8.6* 8.5*   WBC 4.9 5.9   * 91*     COAGS  Recent Labs   Lab 08/25/19  0258 08/24/19  0401   INR 1.19* 1.25*

## 2019-08-31 ENCOUNTER — APPOINTMENT (OUTPATIENT)
Dept: OCCUPATIONAL THERAPY | Facility: CLINIC | Age: 59
DRG: 005 | End: 2019-08-31
Payer: COMMERCIAL

## 2019-08-31 LAB
ALBUMIN SERPL-MCNC: 1.8 G/DL (ref 3.4–5)
ALP SERPL-CCNC: 116 U/L (ref 40–150)
ALT SERPL W P-5'-P-CCNC: 38 U/L (ref 0–50)
ANION GAP SERPL CALCULATED.3IONS-SCNC: 5 MMOL/L (ref 3–14)
AST SERPL W P-5'-P-CCNC: 21 U/L (ref 0–45)
BASOPHILS # BLD AUTO: 0 10E9/L (ref 0–0.2)
BASOPHILS NFR BLD AUTO: 0.3 %
BILIRUB DIRECT SERPL-MCNC: 1.1 MG/DL (ref 0–0.2)
BILIRUB SERPL-MCNC: 1.4 MG/DL (ref 0.2–1.3)
BUN SERPL-MCNC: 26 MG/DL (ref 7–30)
CALCIUM SERPL-MCNC: 8.4 MG/DL (ref 8.5–10.1)
CHLORIDE SERPL-SCNC: 105 MMOL/L (ref 94–109)
CO2 SERPL-SCNC: 28 MMOL/L (ref 20–32)
CREAT SERPL-MCNC: 1.4 MG/DL (ref 0.52–1.04)
DIFFERENTIAL METHOD BLD: ABNORMAL
EOSINOPHIL # BLD AUTO: 0.1 10E9/L (ref 0–0.7)
EOSINOPHIL NFR BLD AUTO: 1.8 %
ERYTHROCYTE [DISTWIDTH] IN BLOOD BY AUTOMATED COUNT: 19.4 % (ref 10–15)
GFR SERPL CREATININE-BSD FRML MDRD: 41 ML/MIN/{1.73_M2}
GLUCOSE BLDC GLUCOMTR-MCNC: 119 MG/DL (ref 70–99)
GLUCOSE BLDC GLUCOMTR-MCNC: 120 MG/DL (ref 70–99)
GLUCOSE BLDC GLUCOMTR-MCNC: 122 MG/DL (ref 70–99)
GLUCOSE BLDC GLUCOMTR-MCNC: 130 MG/DL (ref 70–99)
GLUCOSE BLDC GLUCOMTR-MCNC: 136 MG/DL (ref 70–99)
GLUCOSE SERPL-MCNC: 116 MG/DL (ref 70–99)
HCT VFR BLD AUTO: 25.4 % (ref 35–47)
HGB BLD-MCNC: 7.9 G/DL (ref 11.7–15.7)
IMM GRANULOCYTES # BLD: 0.1 10E9/L (ref 0–0.4)
IMM GRANULOCYTES NFR BLD: 1.8 %
LYMPHOCYTES # BLD AUTO: 0.3 10E9/L (ref 0.8–5.3)
LYMPHOCYTES NFR BLD AUTO: 7.1 %
MAGNESIUM SERPL-MCNC: 1.8 MG/DL (ref 1.6–2.3)
MCH RBC QN AUTO: 31.1 PG (ref 26.5–33)
MCHC RBC AUTO-ENTMCNC: 31.1 G/DL (ref 31.5–36.5)
MCV RBC AUTO: 100 FL (ref 78–100)
MONOCYTES # BLD AUTO: 0.5 10E9/L (ref 0–1.3)
MONOCYTES NFR BLD AUTO: 12.4 %
NEUTROPHILS # BLD AUTO: 3 10E9/L (ref 1.6–8.3)
NEUTROPHILS NFR BLD AUTO: 76.6 %
NRBC # BLD AUTO: 0 10*3/UL
NRBC BLD AUTO-RTO: 0 /100
PHOSPHATE SERPL-MCNC: 4.2 MG/DL (ref 2.5–4.5)
PLATELET # BLD AUTO: 129 10E9/L (ref 150–450)
POTASSIUM SERPL-SCNC: 4.3 MMOL/L (ref 3.4–5.3)
PROT SERPL-MCNC: 4.3 G/DL (ref 6.8–8.8)
RBC # BLD AUTO: 2.54 10E12/L (ref 3.8–5.2)
SODIUM SERPL-SCNC: 138 MMOL/L (ref 133–144)
TACROLIMUS BLD-MCNC: 10.2 UG/L (ref 5–15)
TME LAST DOSE: NORMAL H
WBC # BLD AUTO: 4 10E9/L (ref 4–11)

## 2019-08-31 PROCEDURE — 82248 BILIRUBIN DIRECT: CPT | Performed by: TRANSPLANT SURGERY

## 2019-08-31 PROCEDURE — 27210429 ZZH NUTRITION PRODUCT INTERMEDIATE LITER

## 2019-08-31 PROCEDURE — 25000132 ZZH RX MED GY IP 250 OP 250 PS 637: Performed by: PHYSICIAN ASSISTANT

## 2019-08-31 PROCEDURE — 85025 COMPLETE CBC W/AUTO DIFF WBC: CPT | Performed by: TRANSPLANT SURGERY

## 2019-08-31 PROCEDURE — 80053 COMPREHEN METABOLIC PANEL: CPT | Performed by: TRANSPLANT SURGERY

## 2019-08-31 PROCEDURE — 12000026 ZZH R&B TRANSPLANT

## 2019-08-31 PROCEDURE — 25000132 ZZH RX MED GY IP 250 OP 250 PS 637: Performed by: SURGERY

## 2019-08-31 PROCEDURE — 80197 ASSAY OF TACROLIMUS: CPT | Performed by: PHYSICIAN ASSISTANT

## 2019-08-31 PROCEDURE — 80076 HEPATIC FUNCTION PANEL: CPT | Performed by: TRANSPLANT SURGERY

## 2019-08-31 PROCEDURE — 00000146 ZZHCL STATISTIC GLUCOSE BY METER IP

## 2019-08-31 PROCEDURE — 83735 ASSAY OF MAGNESIUM: CPT | Performed by: TRANSPLANT SURGERY

## 2019-08-31 PROCEDURE — 97140 MANUAL THERAPY 1/> REGIONS: CPT | Mod: GO

## 2019-08-31 PROCEDURE — 36592 COLLECT BLOOD FROM PICC: CPT | Performed by: TRANSPLANT SURGERY

## 2019-08-31 PROCEDURE — 25000132 ZZH RX MED GY IP 250 OP 250 PS 637: Performed by: TRANSPLANT SURGERY

## 2019-08-31 PROCEDURE — 25000125 ZZHC RX 250: Performed by: PHYSICIAN ASSISTANT

## 2019-08-31 PROCEDURE — 25000131 ZZH RX MED GY IP 250 OP 636 PS 637: Performed by: PHYSICIAN ASSISTANT

## 2019-08-31 PROCEDURE — 84100 ASSAY OF PHOSPHORUS: CPT | Performed by: TRANSPLANT SURGERY

## 2019-08-31 PROCEDURE — 25800025 ZZH RX 258: Performed by: PHYSICIAN ASSISTANT

## 2019-08-31 RX ADMIN — FAMOTIDINE 20 MG: 20 TABLET ORAL at 08:10

## 2019-08-31 RX ADMIN — VALGANCICLOVIR HYDROCHLORIDE 450 MG: 50 POWDER, FOR SOLUTION ORAL at 08:09

## 2019-08-31 RX ADMIN — SALINE NASAL SPRAY 1 SPRAY: 1.5 SOLUTION NASAL at 08:10

## 2019-08-31 RX ADMIN — CLOTRIMAZOLE 1 TROCHE: 10 LOZENGE ORAL at 08:11

## 2019-08-31 RX ADMIN — SALINE NASAL SPRAY 1 SPRAY: 1.5 SOLUTION NASAL at 00:32

## 2019-08-31 RX ADMIN — LIDOCAINE: 50 OINTMENT TOPICAL at 07:08

## 2019-08-31 RX ADMIN — TRAMADOL HYDROCHLORIDE 50 MG: 50 TABLET, COATED ORAL at 05:49

## 2019-08-31 RX ADMIN — Medication 100 MG: at 08:09

## 2019-08-31 RX ADMIN — MELATONIN 1000 UNITS: at 19:33

## 2019-08-31 RX ADMIN — TACROLIMUS 1 MG: 1 CAPSULE ORAL at 08:11

## 2019-08-31 RX ADMIN — TRAMADOL HYDROCHLORIDE 50 MG: 50 TABLET, COATED ORAL at 16:38

## 2019-08-31 RX ADMIN — MYCOPHENOLATE MOFETIL 750 MG: 200 POWDER, FOR SUSPENSION ORAL at 19:32

## 2019-08-31 RX ADMIN — POLYETHYLENE GLYCOL 3350 17 G: 17 POWDER, FOR SOLUTION ORAL at 08:10

## 2019-08-31 RX ADMIN — Medication 2 DROP: at 08:10

## 2019-08-31 RX ADMIN — MULTIVITAMIN 15 ML: LIQUID ORAL at 08:11

## 2019-08-31 RX ADMIN — Medication 2 DROP: at 14:27

## 2019-08-31 RX ADMIN — QUETIAPINE FUMARATE 25 MG: 25 TABLET ORAL at 19:32

## 2019-08-31 RX ADMIN — MYCOPHENOLATE MOFETIL 750 MG: 200 POWDER, FOR SUSPENSION ORAL at 08:09

## 2019-08-31 RX ADMIN — LEVOTHYROXINE SODIUM 125 MCG: 0.12 TABLET ORAL at 08:11

## 2019-08-31 RX ADMIN — DEXTROSE AND SODIUM CHLORIDE: 5; 450 INJECTION, SOLUTION INTRAVENOUS at 19:47

## 2019-08-31 RX ADMIN — MELATONIN TAB 3 MG 3 MG: 3 TAB at 19:32

## 2019-08-31 RX ADMIN — OXYMETAZOLINE HYDROCHLORIDE 2 SPRAY: 0.05 SPRAY NASAL at 08:12

## 2019-08-31 RX ADMIN — ASPIRIN 325 MG ORAL TABLET 325 MG: 325 PILL ORAL at 08:11

## 2019-08-31 RX ADMIN — DEXTROSE AND SODIUM CHLORIDE: 5; 450 INJECTION, SOLUTION INTRAVENOUS at 00:32

## 2019-08-31 RX ADMIN — MELATONIN 1000 UNITS: at 08:11

## 2019-08-31 RX ADMIN — TACROLIMUS 1 MG: 1 CAPSULE ORAL at 19:33

## 2019-08-31 RX ADMIN — Medication 1 PACKET: at 08:12

## 2019-08-31 RX ADMIN — SENNOSIDES AND DOCUSATE SODIUM 2 TABLET: 8.6; 5 TABLET ORAL at 08:11

## 2019-08-31 ASSESSMENT — ACTIVITIES OF DAILY LIVING (ADL)
ADLS_ACUITY_SCORE: 27
ADLS_ACUITY_SCORE: 29
ADLS_ACUITY_SCORE: 27
ADLS_ACUITY_SCORE: 27

## 2019-08-31 NOTE — PROGRESS NOTES
Calorie Count  Intake recorded for: 8/30  Total Kcals: 145 Total Protein: 1g  Kcals from Hospital Food: 145   Protein: 1g  Kcals from Outside Food (average):0 Protein: 0g  # Meals Recorded: (100% applesauce, strawberry jello, 50% applesauce)  # Supplements Recorded: 0

## 2019-08-31 NOTE — PLAN OF CARE
"OT/EDEMA 7A: Patient tolerating wear of gradient compression bandaging well according to set x48 hour schedule, however, with persisting 3+ pitting edema over arches of feet and below knee creases. Appropriate to proceed with wear of GCB to assist with fluid movement, skin integrity and comfort and wraps donned using \"Quick Wrap Technique\". Patient may wear wraps an additional x48 hours, however, to be removed if increased pain, numbness/tingling or soiling occurs.  "

## 2019-08-31 NOTE — PLAN OF CARE
[id number]  Status: Patient admitted for DD OLT on 8/18.  VS: VSS on 1L NC.  Neuros: Disoriented to time and place. Slow to respond. Confused and lethargic throughout shift. Following commands. VPM on, no restraints need this shift. Bed alarm on.   GI/: Tolerating regular diet w/ calorie counts. NG w/ TF @ 45 ml/hr. Incontinent of bowel x2. Purewick in place.   IV: R CVC infusing w/ D5 1/2 NS @ 50 ml/hr.  Activity: Up w/ heavy Ax2/GB/walker.   Pain: Abdominal pain controlled w/ PRN Tramadol x1.  Respiratory/Trach: No issues.  Skin: Jaundiced. Transverse abdominal incision w/ sutures. Dressing has dried drainage, no change. L OBED leaking at site, dressing changed. BLE lymphedema wraps.   Labs: BG Q4H - 120 & 122.  Plan of Care: Continue to monitor and follow POC.

## 2019-08-31 NOTE — PROGRESS NOTES
Transplant Surgery  Inpatient Daily Progress Note  08/31/2019    Assessment & Plan: Nicci Pemberton is a 60 yo female with a past medical history significant for end stage liver disease 2/2 ANGULO and alpha 1 anti-trypsin deficiency now s/p DD OLT on 8/18/19    Graft function: Good, AST and ALT now trending down or stable. Tbili improved to 1.4. R drain removed, L drain output still high, will leave at this time.  Immunosuppression management:  mg BID, Tac goal ~10. Level 8/28 high at 17.6. HELD dose. Resumed dosing 8/29 at 1 mg BID. Will follow up level tomorrow. Stopped pred 5mg daily now that tac level therapeutic. Complexity of management: Medium. Contributing factors: anemia and induction  Hematology: Acute blood loss anemia. Transfusion goal hgb > 7. Hgb stable at this time. Last transfused 3 units 8/20 overnight.  HEENT: Post-op nasal bleeding, examination revealed no direct lesion, overall friable mucosa. Re-packed 8/21. Re-bleeding with feeding tube placement 8/22, ENT re-packed around tube. Now stable. Re-advanced feeding tube 8/28, currently in good position.  Cardiorespiratory: Patient extubated 8/24  Hx of hypertension on spironolactone and bumex at home. BPs stable.  Neuro: Toxic and metabolic encephalopathy likely secondary to poor sleep, drugs (tac, pain medication), acute illness. Ordered melatonin and bedtime seroquel to help patient sleep, patient's mental status has been improving overall. Per nursing, did get more agitated after oxycodone dose overnight, will discontinue oxycodone and order tramadol PRN. Lidoderm patches also ordered. Will change seroquel to PRN at bedtime. Continue scheduled melatonin.  GI/Nutrition: NJ now in good position, tube feeds started.  Pt passed swallow eval, diet ordered but minimal intake.   Endocrine: Steroid induced hyperglycemia, resolving, on sliding scale insulin.  Fluid/Electrolytes: GAMAL- CRRT stopped patient will get HD as needed, so far no indication.  Incontinent of urine, so difficult to quantify, but Cr now plateaued, 1.4<1.8< 1.6<-1.4<-1.1<-0.9 (post dialysis). Edematous.   : Incontinent  Infectious disease: Ppx with micafungin x 10 days completed, zosyn completed.  Prophylaxis: DVT, fall, GI, fungal  Disposition: 7A     Medical Decision Making: Medium  Subsequent visit 75280 (moderate level decision making)    VEENA/Fellow/Resident Provider: Renee Allen MD Fellow [id number]    Faculty: Hui Cross M.D.    ________________________________________________________________  Transplant History: Admitted 8/16/2019 for acute decompensated ANGULO.   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    8/18/2019 (Liver), Postoperative day: 13     Interval History:   More alert and mentally with it this AM. Eating a little. Abdominal pain improving. No nausea or vomiting. She is passing flatus and having BMs.    ROS:   A 10-point review of systems was negative except as noted above.    Curent Meds:    aspirin  325 mg Oral Daily     carboxymethylcellulose PF  2 drop Both Eyes Q6H     clotrimazole  1 Molly Buccal 4x Daily     dapsone  100 mg Oral Daily     famotidine  20 mg Oral Daily     levothyroxine  125 mcg Oral Daily     melatonin  3 mg Oral At Bedtime     multivitamins w/minerals  15 mL Per Feeding Tube Daily     mycophenolate  750 mg Oral BID IS    Or     mycophenolate  750 mg Oral or NG Tube BID IS     polyethylene glycol  17 g Oral BID     protein modular  1 packet Per Feeding Tube BID     senna-docusate  2 tablet Oral BID     silver nitrate   Topical Once     sodium chloride  1 spray Both Nostrils Q4H     sodium chloride (PF)  3 mL Intravenous Q8H     sodium chloride (PF)  3 mL Intracatheter Q8H     tacrolimus  1 mg Oral BID IS     valGANciclovir  450 mg Oral Every Other Day    Or     valGANciclovir  450 mg Oral or NG Tube Every Other Day     vitamin D3  1,000 Units Oral BID       Physical Exam:     Admit Weight: 104.3 kg (230  lb)    Current Vitals:   /79 (BP Location: Right arm)   Pulse 98   Temp 98.4  F (36.9  C) (Oral)   Resp 16   Wt 92 kg (202 lb 13.2 oz)   SpO2 98%   BMI 37.10 kg/m      Vital sign ranges:    Temp:  [97.5  F (36.4  C)-98.4  F (36.9  C)] 98.4  F (36.9  C)  Pulse:  [98] 98  Heart Rate:  [82-90] 89  Resp:  [14-16] 16  BP: (108-122)/(65-93) 122/79  SpO2:  [94 %-98 %] 98 %  Patient Vitals for the past 24 hrs:   BP Temp Temp src Pulse Heart Rate Resp SpO2   08/31/19 1040 122/79 98.4  F (36.9  C) Oral 98 -- 16 98 %   08/31/19 0724 114/69 98.4  F (36.9  C) Oral -- 89 16 95 %   08/31/19 0402 108/71 98.4  F (36.9  C) Oral -- 82 14 94 %   08/31/19 0002 (!) 110/93 98.1  F (36.7  C) Oral -- 90 15 95 %   08/30/19 1947 116/65 98  F (36.7  C) Oral -- 90 16 94 %   08/30/19 1624 108/70 97.8  F (36.6  C) Oral -- 88 14 97 %   08/30/19 1201 110/79 97.5  F (36.4  C) Oral -- 90 16 96 %     General Appearance: Awake, alert, arousable  HEENT: Feeding tube in nare  Skin: normal, warm, scattered bruising  Heart: NSR  Lungs: NLB on RA   Abdomen: soft, nondistended, incision sutured, c/d/i. OBED x 1 with serosanguinous output  : Wearing depends  Extremities: edema: present bilaterally. 3+.  Neurologic: Alert, asking appropriate questions    Frailty Scores     There is no flowsheet data to display.          Data:   CMP  Recent Labs   Lab 08/31/19  0617 08/30/19  0433  08/24/19  1456    137   < >  --    POTASSIUM 4.3 4.4   < >  --    CHLORIDE 105 104   < >  --    CO2 28 26   < >  --    * 122*   < >  --    BUN 26 28   < >  --    CR 1.40* 1.75*   < >  --    GFRESTIMATED 41* 31*   < >  --    GFRESTBLACK 47* 36*   < >  --    JACOB 8.4* 8.6   < >  --    ICAW  --   --   --  4.8   MAG 1.8 1.8   < >  --    PHOS 4.2 4.6*   < >  --    ALBUMIN 1.8* 1.9*   < >  --    BILITOTAL 1.4* 1.6*   < >  --    ALKPHOS 116 127   < >  --    AST 21 22   < >  --    ALT 38 40   < >  --     < > = values in this interval not displayed.     CBC  Recent  Labs   Lab 08/31/19  0617 08/30/19  0433   HGB 7.9* 8.6*   WBC 4.0 4.9   * 107*     COAGS  Recent Labs   Lab 08/25/19  0258   INR 1.19*

## 2019-08-31 NOTE — PROVIDER NOTIFICATION
MD notified of sutured incision bleeding from middle of suture line. MD up to unit to assess patient, quickclot applied and there was no further bleed.   Pt remains VSS, continue to monitor and notify MD of any acute changes.

## 2019-08-31 NOTE — PLAN OF CARE
/65   Pulse 92   Temp 98  F (36.7  C) (Oral)   Resp 16   Wt 92 kg (202 lb 13.2 oz)   SpO2 94%   BMI 37.10 kg/m      Patient VSS on 1 LPM NC, afebrile. -116-130. Disoriented to place/time/situation, patient intermittently confused throughout shift, very lethargic for most of day. VPM active, bed alarm on, pt never set alarm off. No restraints today. Incisional pain managed with prn tramadol x1, pain creams ordered and in bin when patient needs them. Tolerating regular diet with minimal appetite. Internal jugular infusing D51/2NS @ 50 ml/hr. NJ to TF @ goal 45 ml/hr, patient tolerating well. 3x loose/brown/green BM's today, incontinent. Continent voids 450 ml and 1x incontinent void, randomized bladder scan 228 ml with a 250 ml void afterwards. Intermittently utilized purewick while in bed. Incision sutured, tiny bleed managed with quick clot, on call MD notified, rounded on patient. L OBED with 200 ml serosang output. Lymphedema wraps on, Generalized edema +3. Up to chair once this evening, to commode twice with assist of 2 + GB. Updated med card and lab book, family education for transplant with mirian and DACIA. Continue to monitor pain, incision for bleeding and encourage oral nutrition and activity.   Will continue with POC and notify MD with changes or concerns.

## 2019-08-31 NOTE — PLAN OF CARE
Neuro: Confused- Alert and oriented to self.  VMP and Bed alarm on .   Cardiac: VSS.   Respiratory: Sating 100% on RA  GI/: Adequate urine output, incontinent of urine- Purwick inplace, changed at 1300. Incontinent of stool during the night. NJ intact with TF infusing.   Diet/appetite: Tolerating Regular diet and continuous TF@ 45ml/hr. Poor PO- total feed. Monitoring blood sugars- coverage as needed   Activity:  Assist of 2, up to chair.  Pain: At acceptable level on current regimen. Declined pain meds.   Skin: Abdomina incision, sutures intact. Small area on left incision draining scant amount of bloody drainage. Left OBED intact- dressing done. BLE edema- legs wrapped today.   LDA's: D51/2Ns @ 50via R CVC    Plan: Continue with POC. Notify primary team with changes. Sisters here to visit today.

## 2019-09-01 ENCOUNTER — APPOINTMENT (OUTPATIENT)
Dept: PHYSICAL THERAPY | Facility: CLINIC | Age: 59
DRG: 005 | End: 2019-09-01
Payer: COMMERCIAL

## 2019-09-01 LAB
ALBUMIN SERPL-MCNC: 1.8 G/DL (ref 3.4–5)
ALP SERPL-CCNC: 123 U/L (ref 40–150)
ALT SERPL W P-5'-P-CCNC: 30 U/L (ref 0–50)
ANION GAP SERPL CALCULATED.3IONS-SCNC: 1 MMOL/L (ref 3–14)
AST SERPL W P-5'-P-CCNC: 18 U/L (ref 0–45)
BASOPHILS # BLD AUTO: 0 10E9/L (ref 0–0.2)
BASOPHILS NFR BLD AUTO: 0.6 %
BILIRUB DIRECT SERPL-MCNC: 0.9 MG/DL (ref 0–0.2)
BILIRUB SERPL-MCNC: 1.3 MG/DL (ref 0.2–1.3)
BUN SERPL-MCNC: 26 MG/DL (ref 7–30)
CALCIUM SERPL-MCNC: 8.4 MG/DL (ref 8.5–10.1)
CHLORIDE SERPL-SCNC: 107 MMOL/L (ref 94–109)
CO2 SERPL-SCNC: 30 MMOL/L (ref 20–32)
CREAT SERPL-MCNC: 1.25 MG/DL (ref 0.52–1.04)
DIFFERENTIAL METHOD BLD: ABNORMAL
EOSINOPHIL # BLD AUTO: 0.1 10E9/L (ref 0–0.7)
EOSINOPHIL NFR BLD AUTO: 2.5 %
ERYTHROCYTE [DISTWIDTH] IN BLOOD BY AUTOMATED COUNT: 19.8 % (ref 10–15)
GFR SERPL CREATININE-BSD FRML MDRD: 47 ML/MIN/{1.73_M2}
GLUCOSE BLDC GLUCOMTR-MCNC: 121 MG/DL (ref 70–99)
GLUCOSE BLDC GLUCOMTR-MCNC: 139 MG/DL (ref 70–99)
GLUCOSE BLDC GLUCOMTR-MCNC: 139 MG/DL (ref 70–99)
GLUCOSE SERPL-MCNC: 121 MG/DL (ref 70–99)
HCT VFR BLD AUTO: 26.9 % (ref 35–47)
HGB BLD-MCNC: 8.1 G/DL (ref 11.7–15.7)
IMM GRANULOCYTES # BLD: 0.1 10E9/L (ref 0–0.4)
IMM GRANULOCYTES NFR BLD: 1.9 %
LYMPHOCYTES # BLD AUTO: 0.3 10E9/L (ref 0.8–5.3)
LYMPHOCYTES NFR BLD AUTO: 8.1 %
MAGNESIUM SERPL-MCNC: 1.6 MG/DL (ref 1.6–2.3)
MCH RBC QN AUTO: 30.2 PG (ref 26.5–33)
MCHC RBC AUTO-ENTMCNC: 30.1 G/DL (ref 31.5–36.5)
MCV RBC AUTO: 100 FL (ref 78–100)
MONOCYTES # BLD AUTO: 0.4 10E9/L (ref 0–1.3)
MONOCYTES NFR BLD AUTO: 10.9 %
NEUTROPHILS # BLD AUTO: 2.7 10E9/L (ref 1.6–8.3)
NEUTROPHILS NFR BLD AUTO: 76 %
NRBC # BLD AUTO: 0 10*3/UL
NRBC BLD AUTO-RTO: 0 /100
PHOSPHATE SERPL-MCNC: 3.8 MG/DL (ref 2.5–4.5)
PLATELET # BLD AUTO: 138 10E9/L (ref 150–450)
POTASSIUM SERPL-SCNC: 4.8 MMOL/L (ref 3.4–5.3)
PROT SERPL-MCNC: 4.4 G/DL (ref 6.8–8.8)
RBC # BLD AUTO: 2.68 10E12/L (ref 3.8–5.2)
SODIUM SERPL-SCNC: 138 MMOL/L (ref 133–144)
TACROLIMUS BLD-MCNC: 7.6 UG/L (ref 5–15)
TME LAST DOSE: NORMAL H
WBC # BLD AUTO: 3.6 10E9/L (ref 4–11)

## 2019-09-01 PROCEDURE — 12000026 ZZH R&B TRANSPLANT

## 2019-09-01 PROCEDURE — 27210429 ZZH NUTRITION PRODUCT INTERMEDIATE LITER

## 2019-09-01 PROCEDURE — 25000131 ZZH RX MED GY IP 250 OP 636 PS 637: Performed by: PHYSICIAN ASSISTANT

## 2019-09-01 PROCEDURE — 80197 ASSAY OF TACROLIMUS: CPT | Performed by: PHYSICIAN ASSISTANT

## 2019-09-01 PROCEDURE — 25000132 ZZH RX MED GY IP 250 OP 250 PS 637: Performed by: PHYSICIAN ASSISTANT

## 2019-09-01 PROCEDURE — 25000132 ZZH RX MED GY IP 250 OP 250 PS 637: Performed by: SURGERY

## 2019-09-01 PROCEDURE — 36592 COLLECT BLOOD FROM PICC: CPT | Performed by: TRANSPLANT SURGERY

## 2019-09-01 PROCEDURE — 97530 THERAPEUTIC ACTIVITIES: CPT | Mod: GP

## 2019-09-01 PROCEDURE — 85025 COMPLETE CBC W/AUTO DIFF WBC: CPT | Performed by: TRANSPLANT SURGERY

## 2019-09-01 PROCEDURE — 84100 ASSAY OF PHOSPHORUS: CPT | Performed by: TRANSPLANT SURGERY

## 2019-09-01 PROCEDURE — 83735 ASSAY OF MAGNESIUM: CPT | Performed by: TRANSPLANT SURGERY

## 2019-09-01 PROCEDURE — 80053 COMPREHEN METABOLIC PANEL: CPT | Performed by: TRANSPLANT SURGERY

## 2019-09-01 PROCEDURE — 82248 BILIRUBIN DIRECT: CPT | Performed by: TRANSPLANT SURGERY

## 2019-09-01 PROCEDURE — 00000146 ZZHCL STATISTIC GLUCOSE BY METER IP

## 2019-09-01 RX ADMIN — TRAMADOL HYDROCHLORIDE 50 MG: 50 TABLET, COATED ORAL at 01:00

## 2019-09-01 RX ADMIN — TACROLIMUS 1 MG: 1 CAPSULE ORAL at 10:11

## 2019-09-01 RX ADMIN — MYCOPHENOLATE MOFETIL 750 MG: 250 CAPSULE ORAL at 10:10

## 2019-09-01 RX ADMIN — TRAMADOL HYDROCHLORIDE 50 MG: 50 TABLET, COATED ORAL at 20:25

## 2019-09-01 RX ADMIN — LEVOTHYROXINE SODIUM 125 MCG: 0.12 TABLET ORAL at 10:11

## 2019-09-01 RX ADMIN — SENNOSIDES AND DOCUSATE SODIUM 2 TABLET: 8.6; 5 TABLET ORAL at 10:12

## 2019-09-01 RX ADMIN — MULTIVITAMIN 15 ML: LIQUID ORAL at 10:11

## 2019-09-01 RX ADMIN — TACROLIMUS 1 MG: 1 CAPSULE ORAL at 18:09

## 2019-09-01 RX ADMIN — MELATONIN 1000 UNITS: at 10:11

## 2019-09-01 RX ADMIN — LIDOCAINE: 50 OINTMENT TOPICAL at 01:00

## 2019-09-01 RX ADMIN — Medication: at 09:36

## 2019-09-01 RX ADMIN — MELATONIN 1000 UNITS: at 20:25

## 2019-09-01 RX ADMIN — SALINE NASAL SPRAY 1 SPRAY: 1.5 SOLUTION NASAL at 01:00

## 2019-09-01 RX ADMIN — MELATONIN TAB 3 MG 3 MG: 3 TAB at 20:26

## 2019-09-01 RX ADMIN — POLYETHYLENE GLYCOL 3350 17 G: 17 POWDER, FOR SOLUTION ORAL at 10:09

## 2019-09-01 RX ADMIN — Medication 1 PACKET: at 20:25

## 2019-09-01 RX ADMIN — ASPIRIN 325 MG ORAL TABLET 325 MG: 325 PILL ORAL at 10:16

## 2019-09-01 RX ADMIN — Medication 100 MG: at 10:10

## 2019-09-01 RX ADMIN — MYCOPHENOLATE MOFETIL 750 MG: 200 POWDER, FOR SUSPENSION ORAL at 18:09

## 2019-09-01 RX ADMIN — FAMOTIDINE 20 MG: 20 TABLET ORAL at 10:11

## 2019-09-01 RX ADMIN — Medication 1 PACKET: at 10:10

## 2019-09-01 RX ADMIN — QUETIAPINE FUMARATE 25 MG: 25 TABLET ORAL at 20:25

## 2019-09-01 RX ADMIN — CLOTRIMAZOLE 1 TROCHE: 10 LOZENGE ORAL at 10:11

## 2019-09-01 ASSESSMENT — ACTIVITIES OF DAILY LIVING (ADL)
ADLS_ACUITY_SCORE: 29
ADLS_ACUITY_SCORE: 28

## 2019-09-01 NOTE — PLAN OF CARE
/73   Pulse 98   Temp 98  F (36.7  C) (Axillary)   Resp 16   Wt 92 kg (202 lb 13.2 oz)   SpO2 93%   BMI 37.10 kg/m       4075-0510  AVSS on RA. Pain managed with PRN tramadol given x1; available at midnight. Denies nausea. Regular diet; calorie count- poor appetite and PO intake. Continuous TF @ goal rate of 45 ml/hr into NJ. Takes oral medications well with apple sauce; some down tube. Left OBED; good serous output. Purewick in place; she did have urine and BM earlier in shift.  visited and stayed by bedside for sometime this evening. Triple lumen CVC infusing with D5 1/2 NS @ 50 ml/hr; other lumens saline locked. Incision; sutured; red around edges; otherwise healing well. Abdomen not distended or taut. Patient disorientated x2; time and situation. Patient had very active day; rested most of evening. Continue plan of care; please notify MD with any changes.

## 2019-09-01 NOTE — PLAN OF CARE
[id number]  Status: Patient admitted for DD OLT on 8/18.  VS: VSS on RA. BPs soft.  Neuros: Oriented to self. Slow to respond. Intermittently following commands. VPM and bed alarm on.   GI/: Tolerating regular diet w/ calorie counts. NG w/ TF @ 45 ml/hr. Incontinent of bowel x1. Purewick in place changed at 0100. Bladder scanned for 255 & 195.  IV: R CVC infusing w/ D5 1/2 NS @ 50 ml/hr.  Activity: Up w/ heavy Ax2-3/GB/walker.   Pain: Abdominal pain controlled w/ PRN Tramadol x1 and Lidocaine cream.  Respiratory/Trach: No issues.  Skin: Jaundiced. Transverse abdominal incision w/ sutures. L OBED leaking at site, dressing changed. BLE lymphedema wraps.   Labs: BG Q4H - 139 x2.  Plan of Care: Continue to monitor and follow POC.

## 2019-09-01 NOTE — PROGRESS NOTES
Calorie Count  Intake recorded for: 8/31  Total Kcals: 91 Total Protein: 3g  Kcals from Hospital Food: 91   Protein: 3g  Kcals from Outside Food (average):0 Protein: 0g  # Meals Recorded: (25% garden vegetable soup w/ crackers)   # Supplements Recorded: 25% breeze peach

## 2019-09-01 NOTE — PLAN OF CARE
Discharge Planner PT  7A  Patient plan for discharge: not discussed today  Current status: VSS on RA. Increased lethargy at beginning of session - improving alertness and kept eyes open during transfer and when in recliner. Adhered to abdominal precautions with cues from PT. Progressed bed mob, standing tolerance, and transfers with mod A x 2 + FWW. Supine > EOB, min A at trunk and B LEs. Good sitting balance when weight bearing through B UEs - cues for placement of hands. SBA for scooting at EOB. EOB > recliner, mod A x 2 with FWW - cues for stepping pattern, walker navigation, and upright/midline posture. Max A x 2 for scooting in recliner. Increased posterior body lean with increased weight bearing through B heels placing pt's COG posteriorly where pt requires increased assistance and cues to correct. Increased time for motor processing/planning and speech. Limited by fatigue and pain. Recommend nursing utilize A x 2 with FWW to/from EOB and recliner - be patient as pt requires increased time to complete transitions in positions.    Barriers to return to prior living situation: medical status, current mob status, level of A, post op precautions, balance, falls risk, cognition, lethargy, pain  Recommendations for discharge: TCU  Rationale for recommendations: PT continues to recommend discharge to TCU to improve strength, balance, and endurance to promote increased IND with functional mobility. Pt is mobilizing below baseline and is unsafe to return home.          Entered by: Viola Hernandez 09/01/2019 3:05 PM

## 2019-09-01 NOTE — PLAN OF CARE
/72 (BP Location: Right arm)   Pulse 89   Temp 98.1  F (36.7  C) (Oral)   Resp 16   Wt 92 kg (202 lb 13.2 oz)   SpO2 95%   BMI 37.10 kg/m      Patient VSS on RA, afebrile. . VPM and bed alarm active, patient confused and lethargic. Disoriented to place, situation and time. Abdominal discomfort managed with [place]. Tolerating regular diet with fair appetite, NJ @ 45 ml/hr. internal jugular infusing IVF @ 50 m/hr. BM today. Voiding incontinent in brief while up in chair. Incision sutured, dressing CDI. L OBED site dressing needs to be changed. Minimal serous drainage noted. Up with 2 assist walker +GB to chair with PT today, patient remained in chair throughout shift with weight shifts q2h. Educated for antirejection medication regimen with spouse at bedside. Continue to encourage activity and nutrition, manage pain when needed.   Will continue with POC and notify MD with changes or concerns.'

## 2019-09-01 NOTE — PROGRESS NOTES
Transplant Surgery  Inpatient Daily Progress Note  09/01/2019    Assessment & Plan: Nicci Pemberton is a 60 yo female with a past medical history significant for end stage liver disease 2/2 ANGULO and alpha 1 anti-trypsin deficiency now s/p DD OLT on 8/18/19    Graft function: Good, AST and ALT now trending down or stable. Tbili improved to 1.3. R drain removed, L drain output still high, will leave at this time.  Immunosuppression management:  mg BID, Tac goal ~10. Level 8/28 high at 17.6. HELD dose. Resumed dosing 8/29 at 1 mg BID. Will follow up level tomorrow. Stopped pred 5mg daily now that tac level therapeutic. Complexity of management: Medium. Contributing factors: anemia and induction  Hematology: Acute blood loss anemia. Transfusion goal hgb > 7. Hgb stable at this time. Last transfused 3 units 8/20 overnight.  HEENT: Post-op nasal bleeding, examination revealed no direct lesion, overall friable mucosa. Re-packed 8/21. Re-bleeding with feeding tube placement 8/22, ENT re-packed around tube. Now stable. Re-advanced feeding tube 8/28, currently in good position.  Cardiorespiratory: Patient extubated 8/24  Hx of hypertension on spironolactone and bumex at home. BPs stable.  Neuro: Toxic and metabolic encephalopathy likely secondary to poor sleep, drugs (tac, pain medication), acute illness. Ordered melatonin and bedtime seroquel to help patient sleep, patient's mental status has been improving overall. Per nursing, did get more agitated after oxycodone dose overnight, will discontinue oxycodone and order tramadol PRN. Lidoderm patches also ordered. Will change seroquel to PRN at bedtime. Continue scheduled melatonin.  GI/Nutrition: NJ now in good position, tube feeds going at goal.  Pt passed swallow eval, diet ordered but minimal intake.   Endocrine: Steroid induced hyperglycemia, resolving, on sliding scale insulin.  Fluid/Electrolytes: GAMAL- CRRT stopped patient will get HD as needed, so far no  indication. Incontinent of urine, so difficult to quantify, but Cr now plateaued, 1.25<1.4<1.8< 1.6<-1.4<-1.1<-0.9 (post dialysis). Edematous.   : Incontinent  Infectious disease: Ppx with micafungin x 10 days completed, zosyn completed.  Prophylaxis: DVT, fall, GI, fungal  Disposition: 7A     Medical Decision Making: Medium  Subsequent visit 53709 (moderate level decision making)    VEENA/Fellow/Resident Provider: Renee Allen MD Fellow [id number]    Faculty: Hui Cross M.D.    ________________________________________________________________  Transplant History: Admitted 8/16/2019 for acute decompensated ANGULO.   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    8/18/2019 (Liver), Postoperative day: 14     Interval History:   No abdominal pain, no nausea/vomiting. She is passing flatus and having BMs.    ROS:   A 10-point review of systems was negative except as noted above.    Curent Meds:    aspirin  325 mg Oral Daily     carboxymethylcellulose PF  2 drop Both Eyes Q6H     clotrimazole  1 Molly Buccal 4x Daily     dapsone  100 mg Oral Daily     famotidine  20 mg Oral Daily     levothyroxine  125 mcg Oral Daily     melatonin  3 mg Oral At Bedtime     multivitamins w/minerals  15 mL Per Feeding Tube Daily     mycophenolate  750 mg Oral BID IS    Or     mycophenolate  750 mg Oral or NG Tube BID IS     polyethylene glycol  17 g Oral BID     protein modular  1 packet Per Feeding Tube BID     senna-docusate  2 tablet Oral BID     silver nitrate   Topical Once     sodium chloride  1 spray Both Nostrils Q4H     sodium chloride (PF)  3 mL Intravenous Q8H     sodium chloride (PF)  3 mL Intracatheter Q8H     tacrolimus  1 mg Oral BID IS     valGANciclovir  450 mg Oral Every Other Day    Or     valGANciclovir  450 mg Oral or NG Tube Every Other Day     vitamin D3  1,000 Units Oral BID       Physical Exam:     Admit Weight: 104.3 kg (230 lb)    Current Vitals:   /72 (BP Location: Right  arm)   Pulse 98   Temp 96.7  F (35.9  C) (Axillary)   Resp 16   Wt 92 kg (202 lb 13.2 oz)   SpO2 93%   BMI 37.10 kg/m      Vital sign ranges:    Temp:  [96.7  F (35.9  C)-98.4  F (36.9  C)] 96.7  F (35.9  C)  Pulse:  [98] 98  Heart Rate:  [90-99] 92  Resp:  [13-16] 16  BP: (102-122)/(69-79) 117/72  SpO2:  [93 %-98 %] 93 %  Patient Vitals for the past 24 hrs:   BP Temp Temp src Pulse Heart Rate Resp SpO2 Weight   09/01/19 0733 117/72 96.7  F (35.9  C) Axillary -- 92 16 93 % --   09/01/19 0442 104/69 -- -- -- -- -- -- --   09/01/19 0438 102/77 96.7  F (35.9  C) Axillary -- 99 13 93 % --   09/01/19 0054 110/72 97.4  F (36.3  C) Oral -- 98 16 93 % --   08/31/19 1959 118/73 98  F (36.7  C) Axillary -- 90 16 93 % --   08/31/19 1617 112/69 97.6  F (36.4  C) Oral -- 90 16 93 % --   08/31/19 1333 -- -- -- -- -- -- -- 92 kg (202 lb 13.2 oz)   08/31/19 1040 122/79 98.4  F (36.9  C) Oral 98 -- 16 98 % --     General Appearance: Awake, alert, arousable  HEENT: Feeding tube in nare  Skin: normal, warm, scattered bruising  Heart: NSR  Lungs: NLB on RA   Abdomen: soft, nondistended, incision sutured, c/d/i. OBED x 1 with serosanguinous output  : Wearing depends  Extremities: edema: present bilaterally. 3+.  Neurologic: Alert, asking appropriate questions    Frailty Scores     There is no flowsheet data to display.          Data:   CMP  Recent Labs   Lab 09/01/19  0544 08/31/19  0617    138   POTASSIUM 4.8 4.3   CHLORIDE 107 105   CO2 30 28   * 116*   BUN 26 26   CR 1.25* 1.40*   GFRESTIMATED 47* 41*   GFRESTBLACK 54* 47*   JACOB 8.4* 8.4*   MAG 1.6 1.8   PHOS 3.8 4.2   ALBUMIN 1.8* 1.8*   BILITOTAL 1.3 1.4*   ALKPHOS 123 116   AST 18 21   ALT 30 38     CBC  Recent Labs   Lab 09/01/19  0544 08/31/19  0617   HGB 8.1* 7.9*   WBC 3.6* 4.0   * 129*     COAGS  No lab results found in last 7 days.    Invalid input(s): XA

## 2019-09-02 LAB
ALBUMIN SERPL-MCNC: 1.7 G/DL (ref 3.4–5)
ALP SERPL-CCNC: 126 U/L (ref 40–150)
ALT SERPL W P-5'-P-CCNC: 28 U/L (ref 0–50)
ANION GAP SERPL CALCULATED.3IONS-SCNC: 4 MMOL/L (ref 3–14)
AST SERPL W P-5'-P-CCNC: 21 U/L (ref 0–45)
BASOPHILS # BLD AUTO: 0 10E9/L (ref 0–0.2)
BASOPHILS NFR BLD AUTO: 0.3 %
BILIRUB DIRECT SERPL-MCNC: 0.9 MG/DL (ref 0–0.2)
BILIRUB SERPL-MCNC: 1.2 MG/DL (ref 0.2–1.3)
BUN SERPL-MCNC: 22 MG/DL (ref 7–30)
CALCIUM SERPL-MCNC: 8.5 MG/DL (ref 8.5–10.1)
CHLORIDE SERPL-SCNC: 105 MMOL/L (ref 94–109)
CO2 SERPL-SCNC: 29 MMOL/L (ref 20–32)
CREAT SERPL-MCNC: 1.01 MG/DL (ref 0.52–1.04)
DIFFERENTIAL METHOD BLD: ABNORMAL
EOSINOPHIL # BLD AUTO: 0.1 10E9/L (ref 0–0.7)
EOSINOPHIL NFR BLD AUTO: 2.5 %
ERYTHROCYTE [DISTWIDTH] IN BLOOD BY AUTOMATED COUNT: 20.4 % (ref 10–15)
GFR SERPL CREATININE-BSD FRML MDRD: 61 ML/MIN/{1.73_M2}
GLUCOSE BLDC GLUCOMTR-MCNC: 127 MG/DL (ref 70–99)
GLUCOSE BLDC GLUCOMTR-MCNC: 134 MG/DL (ref 70–99)
GLUCOSE SERPL-MCNC: 116 MG/DL (ref 70–99)
HCT VFR BLD AUTO: 25.6 % (ref 35–47)
HGB BLD-MCNC: 7.9 G/DL (ref 11.7–15.7)
IMM GRANULOCYTES # BLD: 0.1 10E9/L (ref 0–0.4)
IMM GRANULOCYTES NFR BLD: 2.5 %
LACTATE BLD-SCNC: 0.6 MMOL/L (ref 0.7–2)
LYMPHOCYTES # BLD AUTO: 0.3 10E9/L (ref 0.8–5.3)
LYMPHOCYTES NFR BLD AUTO: 7.3 %
MAGNESIUM SERPL-MCNC: 1.6 MG/DL (ref 1.6–2.3)
MCH RBC QN AUTO: 31.2 PG (ref 26.5–33)
MCHC RBC AUTO-ENTMCNC: 30.9 G/DL (ref 31.5–36.5)
MCV RBC AUTO: 101 FL (ref 78–100)
MONOCYTES # BLD AUTO: 0.4 10E9/L (ref 0–1.3)
MONOCYTES NFR BLD AUTO: 11.6 %
NEUTROPHILS # BLD AUTO: 2.7 10E9/L (ref 1.6–8.3)
NEUTROPHILS NFR BLD AUTO: 75.8 %
NRBC # BLD AUTO: 0 10*3/UL
NRBC BLD AUTO-RTO: 0 /100
PHOSPHATE SERPL-MCNC: 3.8 MG/DL (ref 2.5–4.5)
PLATELET # BLD AUTO: 147 10E9/L (ref 150–450)
POTASSIUM SERPL-SCNC: 5.1 MMOL/L (ref 3.4–5.3)
PROT SERPL-MCNC: 4.6 G/DL (ref 6.8–8.8)
RBC # BLD AUTO: 2.53 10E12/L (ref 3.8–5.2)
SODIUM SERPL-SCNC: 138 MMOL/L (ref 133–144)
TACROLIMUS BLD-MCNC: 5.7 UG/L (ref 5–15)
TME LAST DOSE: NORMAL H
WBC # BLD AUTO: 3.5 10E9/L (ref 4–11)

## 2019-09-02 PROCEDURE — 82248 BILIRUBIN DIRECT: CPT | Performed by: TRANSPLANT SURGERY

## 2019-09-02 PROCEDURE — 12000026 ZZH R&B TRANSPLANT

## 2019-09-02 PROCEDURE — 25000132 ZZH RX MED GY IP 250 OP 250 PS 637: Performed by: PHYSICIAN ASSISTANT

## 2019-09-02 PROCEDURE — 25000132 ZZH RX MED GY IP 250 OP 250 PS 637: Performed by: SURGERY

## 2019-09-02 PROCEDURE — 85025 COMPLETE CBC W/AUTO DIFF WBC: CPT | Performed by: TRANSPLANT SURGERY

## 2019-09-02 PROCEDURE — 25000131 ZZH RX MED GY IP 250 OP 636 PS 637: Performed by: PHYSICIAN ASSISTANT

## 2019-09-02 PROCEDURE — 83605 ASSAY OF LACTIC ACID: CPT | Performed by: TRANSPLANT SURGERY

## 2019-09-02 PROCEDURE — 36592 COLLECT BLOOD FROM PICC: CPT | Performed by: TRANSPLANT SURGERY

## 2019-09-02 PROCEDURE — 80197 ASSAY OF TACROLIMUS: CPT | Performed by: PHYSICIAN ASSISTANT

## 2019-09-02 PROCEDURE — 83735 ASSAY OF MAGNESIUM: CPT | Performed by: TRANSPLANT SURGERY

## 2019-09-02 PROCEDURE — 84100 ASSAY OF PHOSPHORUS: CPT | Performed by: TRANSPLANT SURGERY

## 2019-09-02 PROCEDURE — 80053 COMPREHEN METABOLIC PANEL: CPT | Performed by: TRANSPLANT SURGERY

## 2019-09-02 PROCEDURE — 25000131 ZZH RX MED GY IP 250 OP 636 PS 637: Performed by: TRANSPLANT SURGERY

## 2019-09-02 PROCEDURE — 00000146 ZZHCL STATISTIC GLUCOSE BY METER IP

## 2019-09-02 PROCEDURE — 25000132 ZZH RX MED GY IP 250 OP 250 PS 637: Performed by: TRANSPLANT SURGERY

## 2019-09-02 RX ORDER — PREDNISOLONE 5 MG/1
5 TABLET ORAL DAILY
Status: DISCONTINUED | OUTPATIENT
Start: 2019-09-02 | End: 2019-09-03

## 2019-09-02 RX ORDER — MYCOPHENOLATE MOFETIL 200 MG/ML
1000 POWDER, FOR SUSPENSION ORAL
Status: DISCONTINUED | OUTPATIENT
Start: 2019-09-02 | End: 2019-09-10

## 2019-09-02 RX ORDER — VALGANCICLOVIR HYDROCHLORIDE 50 MG/ML
450 POWDER, FOR SOLUTION ORAL DAILY
Status: DISCONTINUED | OUTPATIENT
Start: 2019-09-03 | End: 2019-09-12 | Stop reason: CLARIF

## 2019-09-02 RX ORDER — MYCOPHENOLATE MOFETIL 250 MG/1
1000 CAPSULE ORAL
Status: DISCONTINUED | OUTPATIENT
Start: 2019-09-02 | End: 2019-09-10

## 2019-09-02 RX ORDER — CYCLOSPORINE 100 MG/1
300 CAPSULE, LIQUID FILLED ORAL
Status: DISCONTINUED | OUTPATIENT
Start: 2019-09-02 | End: 2019-09-04

## 2019-09-02 RX ORDER — VALGANCICLOVIR 450 MG/1
450 TABLET, FILM COATED ORAL DAILY
Status: DISCONTINUED | OUTPATIENT
Start: 2019-09-03 | End: 2019-09-23 | Stop reason: HOSPADM

## 2019-09-02 RX ADMIN — VALGANCICLOVIR HYDROCHLORIDE 450 MG: 50 POWDER, FOR SOLUTION ORAL at 08:07

## 2019-09-02 RX ADMIN — Medication 1 PACKET: at 08:06

## 2019-09-02 RX ADMIN — CLOTRIMAZOLE 1 TROCHE: 10 LOZENGE ORAL at 20:18

## 2019-09-02 RX ADMIN — MELATONIN 1000 UNITS: at 08:06

## 2019-09-02 RX ADMIN — MULTIVITAMIN 15 ML: LIQUID ORAL at 08:07

## 2019-09-02 RX ADMIN — SENNOSIDES AND DOCUSATE SODIUM 2 TABLET: 8.6; 5 TABLET ORAL at 08:07

## 2019-09-02 RX ADMIN — Medication 12.5 MG: at 04:44

## 2019-09-02 RX ADMIN — Medication 2 DROP: at 01:14

## 2019-09-02 RX ADMIN — MYCOPHENOLATE MOFETIL 750 MG: 200 POWDER, FOR SUSPENSION ORAL at 08:07

## 2019-09-02 RX ADMIN — TACROLIMUS 1 MG: 1 CAPSULE ORAL at 08:06

## 2019-09-02 RX ADMIN — Medication 100 MG: at 08:06

## 2019-09-02 RX ADMIN — CYCLOSPORINE 300 MG: 100 CAPSULE, LIQUID FILLED ORAL at 18:03

## 2019-09-02 RX ADMIN — PREDNISOLONE 5 MG: 5 TABLET ORAL at 20:17

## 2019-09-02 RX ADMIN — ASPIRIN 325 MG ORAL TABLET 325 MG: 325 PILL ORAL at 08:06

## 2019-09-02 RX ADMIN — MELATONIN TAB 3 MG 3 MG: 3 TAB at 20:18

## 2019-09-02 RX ADMIN — Medication 2 DROP: at 08:07

## 2019-09-02 RX ADMIN — MYCOPHENOLATE MOFETIL 1000 MG: 250 CAPSULE ORAL at 18:03

## 2019-09-02 RX ADMIN — Medication 2 DROP: at 20:18

## 2019-09-02 RX ADMIN — Medication 1 PACKET: at 20:18

## 2019-09-02 RX ADMIN — SALINE NASAL SPRAY 1 SPRAY: 1.5 SOLUTION NASAL at 20:20

## 2019-09-02 RX ADMIN — LEVOTHYROXINE SODIUM 125 MCG: 0.12 TABLET ORAL at 08:06

## 2019-09-02 RX ADMIN — MELATONIN 1000 UNITS: at 20:18

## 2019-09-02 RX ADMIN — FAMOTIDINE 20 MG: 20 TABLET ORAL at 08:07

## 2019-09-02 ASSESSMENT — ACTIVITIES OF DAILY LIVING (ADL)
ADLS_ACUITY_SCORE: 28

## 2019-09-02 NOTE — PLAN OF CARE
/75 (BP Location: Right arm)   Pulse 89   Temp 98.4  F (36.9  C) (Oral)   Resp 18   Wt 92 kg (202 lb 13.2 oz)   SpO2 92%   BMI 37.10 kg/m      2894-9595: A/Ox1; disoriented to time, situation, and place. Pt became agitated around 0430; unable to reorient. Pt was given PRN seroquel at this time. Staff were able to successfully calm patient down without having to initiate mitts or restraints. Bed alarm remains on, as does VPM. VSS on RA. Regular diet + calorie counts. Assist x2/ mechanical lift. Unable to assess pain, but pt does not express any non-verbal indicators of pain. Clamshell incision with sutures. R internal jugular with MIVF @ 50mL/hr. NJ with TF @ 45mL/hr continuously. Purewick in place; pt incontinent of urine. Large incontinent stool x1. BGs 134 and 127 overnight. Despite the single episode of agitation, pt slept well overnight with minimal awakenings.

## 2019-09-02 NOTE — PROGRESS NOTES
Immunosuppression Note:    Nicci Pemberton is a 59 year old female who is seen today  for immunosuppression management     I, Mandeep Alford MD, I have examined the patient with the resident/PA/Fellow, discussed and agree with the note and findings.  I have reviewed today's vital signs, medications, labs and imaging. I reviewed the immunosuppression medications and levels. I spoke to the patient/family and explained below clinical details and answered all the questions      Transplant Surgery  Inpatient Daily Progress Note  09/02/2019    Assessment & Plan: Nicci Pemberton is a 58 yo female with a past medical history significant for end stage liver disease 2/2 ANGULO and alpha 1 anti-trypsin deficiency now s/p DD OLT on 8/18/19    Graft function: Good, AST and ALT now trending down or stable. Tbili improved to 1.2. R drain removed, L drain output still high, will leave at this time.    Immunosuppression management:  mg BID, Tac goal ~10. Level 8/28 high at 7.6. HELD dose. Resumed dosing 8/29 at 1 mg BID. Will follow up level tomorrow. Stopped pred 5mg daily now that tac level therapeutic. Complexity of management: Medium. Contributing factors: anemia and induction     Hematology: Acute blood loss anemia. Transfusion goal hgb > 7. Hgb stable at this time. Last transfused 3 units 8/20 overnight.    HEENT: Post-op nasal bleeding, examination revealed no direct lesion, overall friable mucosa. Re-packed 8/21. Re-bleeding with feeding tube placement 8/22, ENT re-packed around tube. Now stable. Re-advanced feeding tube 8/28, currently in good position.    Cardiorespiratory: Patient extubated 8/24  Hx of hypertension on spironolactone and bumex at home. BPs stable.    Neuro: Toxic and metabolic encephalopathy likely secondary to poor sleep, drugs (tac, pain medication), acute illness. Ordered melatonin and bedtime seroquel to help patient sleep, patient's mental status has been improving overall. Per nursing, did get  more agitated after oxycodone dose overnight, will discontinue oxycodone and order tramadol PRN. Lidoderm patches also ordered. Will change seroquel to PRN at bedtime. Continue scheduled melatonin. MRI head today, will switch to cyclosporine from prograf    GI/Nutrition: NJ now in good position, tube feeds going at goal.  Pt passed swallow eval, diet ordered but minimal intake.   Endocrine: Steroid induced hyperglycemia, resolving, on sliding scale insulin.  Fluid/Electrolytes: GAMAL- CRRT stopped patient will get HD as needed, so far no indication. Incontinent of urine, so difficult to quantify, but Cr now plateaued, 1.25<1.4<1.8< 1.6<-1.4<-1.1<-0.9 (post dialysis). Edematous.   : Incontinent  Infectious disease: Ppx with micafungin x 10 days completed, zosyn completed.  Prophylaxis: DVT, fall, GI, fungal  Disposition: 7A     Medical Decision Making: Medium  Subsequent visit 44523 (moderate level decision making)    VEENA/Fellow/Resident Provider: Renee Allen MD Fellow [id number]    Faculty: NATHALY Alford MD    ________________________________________________________________  Transplant History: Admitted 8/16/2019 for acute decompensated ANGULO.   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    8/18/2019 (Liver), Postoperative day: 15     Interval History:   No abdominal pain, no nausea/vomiting. She is passing flatus and having BMs continues to be confused.    ROS:   A 10-point review of systems was negative except as noted above.    Curent Meds:    aspirin  325 mg Oral Daily     carboxymethylcellulose PF  2 drop Both Eyes Q6H     clotrimazole  1 Molly Buccal 4x Daily     dapsone  100 mg Oral Daily     famotidine  20 mg Oral Daily     levothyroxine  125 mcg Oral Daily     melatonin  3 mg Oral At Bedtime     multivitamins w/minerals  15 mL Per Feeding Tube Daily     mycophenolate  750 mg Oral BID IS    Or     mycophenolate  750 mg Oral or NG Tube BID IS     polyethylene glycol  17 g  Oral BID     protein modular  1 packet Per Feeding Tube BID     senna-docusate  2 tablet Oral BID     sodium chloride  1 spray Both Nostrils Q4H     sodium chloride (PF)  3 mL Intravenous Q8H     sodium chloride (PF)  3 mL Intracatheter Q8H     [START ON 9/3/2019] valGANciclovir  450 mg Oral Daily    Or     [START ON 9/3/2019] valGANciclovir  450 mg Oral or NG Tube Daily     vitamin D3  1,000 Units Oral BID       Physical Exam:     Admit Weight: 104.3 kg (230 lb)    Current Vitals:   /66   Pulse 89   Temp 98.8  F (37.1  C)   Resp 20   Wt 92 kg (202 lb 13.2 oz)   SpO2 93%   BMI 37.10 kg/m      Vital sign ranges:    Temp:  [97.5  F (36.4  C)-98.8  F (37.1  C)] 98.8  F (37.1  C)  Heart Rate:  [] 102  Resp:  [16-20] 20  BP: ()/(66-75) 106/66  SpO2:  [92 %-94 %] 93 %  Patient Vitals for the past 24 hrs:   BP Temp Temp src Heart Rate Resp SpO2   09/02/19 0731 106/66 98.8  F (37.1  C) -- 102 20 93 %   09/02/19 0431 107/75 98.4  F (36.9  C) Oral 102 18 92 %   09/02/19 0025 98/72 98.8  F (37.1  C) Axillary 104 18 94 %   09/01/19 1949 97/72 97.5  F (36.4  C) Oral 95 18 94 %   09/01/19 1546 108/73 97.6  F (36.4  C) Oral 91 16 94 %     General Appearance: Awake, alert, arousable, confused  HEENT: Feeding tube in nare  Skin: normal, warm, scattered bruising  Heart: NSR  Lungs: NLB on RA   Abdomen: soft, nondistended, incision sutured, c/d/i. OBED x 1 with serosanguinous output  : Wearing depends  Extremities: edema: present bilaterally. 3+.  Neurologic: Alert     Frailty Scores     There is no flowsheet data to display.          Data:   CMP  Recent Labs   Lab 09/02/19  0657 09/01/19  0544    138   POTASSIUM 5.1 4.8   CHLORIDE 105 107   CO2 29 30   * 121*   BUN 22 26   CR 1.01 1.25*   GFRESTIMATED 61 47*   GFRESTBLACK 70 54*   JACOB 8.5 8.4*   MAG 1.6 1.6   PHOS 3.8 3.8   ALBUMIN 1.7* 1.8*   BILITOTAL 1.2 1.3   ALKPHOS 126 123   AST 21 18   ALT 28 30     CBC  Recent Labs   Lab 09/02/19  0657  09/01/19  0544   HGB 7.9* 8.1*   WBC 3.5* 3.6*   * 138*     COAGS  No lab results found in last 7 days.    Invalid input(s): XA

## 2019-09-02 NOTE — PLAN OF CARE
/67 (BP Location: Right arm)   Pulse 89   Temp 98.5  F (36.9  C) (Oral)   Resp 20   Wt 92 kg (202 lb 13.2 oz)   SpO2 92%   BMI 37.10 kg/m   AVSS on RA. Pt oriented to self. Unable to assess pain and nausea d/t mentation, pt appears to be comfortable. Urine Output - Purewick in place, continent of urine and stool x1 and used the bed pan. Bowel Function - incontinent of stool x3. Nutrition - regular diet w/ calorie counts, no oral intake despite encouragement. TF infusing via NJ at 45 ml/hr. Drains - OBED, leaky at site, with good amount of output. Activity -  turned/repositioned in bed. Education - med card and lab book updated. Plan of Care - brain MRI ordered, to be completed tomorrow, checklist sent to MRI today. Will continue to monitor and update team w/ changes.

## 2019-09-02 NOTE — PROGRESS NOTES
Calorie Count  Intake recorded for: 9/1  Total Kcals: 123 Total Protein: 7g  Kcals from Hospital Food: 123  Protein: 7g  Kcals from Outside Food (average):0 Protein: 0g  # Meals Recorded: 25% tomato soup w/ crackers, tuna salad sandwich, grapes  # Supplements Recorded: 0

## 2019-09-02 NOTE — PLAN OF CARE
BP 97/72 (BP Location: Right arm)   Pulse 89   Temp 97.5  F (36.4  C) (Oral)   Resp 18   Wt 92 kg (202 lb 13.2 oz)   SpO2 94%   BMI 37.10 kg/m       [id number]  Patient baseline; lower BP. All other VSS on RA. Pain managed with PRN tramadol given x1. Denies nausea. Regular diet; very poor appetite and PO intake. Takes PO medication very well with apple sauce. Continuous TF into NJ at goal rate of 45 ml/hr. Triple lumen CVC infusing with D5 1/2 NS @ 50 ml/hr. Adequate urine output; BM this evening- incontinent. Left OBED; very leaky site- dressing changed. Good amount of serous output. Patient continues to be intermittently confused and lethargic. Orientated only to self this evening.  was by bedside earlier this evening. Incision sutured; erythema around incision. Up with lift; turns great. Continue plan of care; please notify MD with any changes.

## 2019-09-03 ENCOUNTER — APPOINTMENT (OUTPATIENT)
Dept: MRI IMAGING | Facility: CLINIC | Age: 59
DRG: 005 | End: 2019-09-03
Attending: SURGERY
Payer: COMMERCIAL

## 2019-09-03 ENCOUNTER — APPOINTMENT (OUTPATIENT)
Dept: OCCUPATIONAL THERAPY | Facility: CLINIC | Age: 59
DRG: 005 | End: 2019-09-03
Payer: COMMERCIAL

## 2019-09-03 ENCOUNTER — APPOINTMENT (OUTPATIENT)
Dept: PHYSICAL THERAPY | Facility: CLINIC | Age: 59
DRG: 005 | End: 2019-09-03
Payer: COMMERCIAL

## 2019-09-03 LAB
ALBUMIN SERPL-MCNC: 1.8 G/DL (ref 3.4–5)
ALP SERPL-CCNC: 121 U/L (ref 40–150)
ALT SERPL W P-5'-P-CCNC: 26 U/L (ref 0–50)
ANION GAP SERPL CALCULATED.3IONS-SCNC: 4 MMOL/L (ref 3–14)
AST SERPL W P-5'-P-CCNC: 22 U/L (ref 0–45)
BASOPHILS # BLD AUTO: 0 10E9/L (ref 0–0.2)
BASOPHILS NFR BLD AUTO: 0.3 %
BILIRUB DIRECT SERPL-MCNC: 1 MG/DL (ref 0–0.2)
BILIRUB SERPL-MCNC: 1.3 MG/DL (ref 0.2–1.3)
BUN SERPL-MCNC: 21 MG/DL (ref 7–30)
CALCIUM SERPL-MCNC: 8.4 MG/DL (ref 8.5–10.1)
CHLORIDE SERPL-SCNC: 104 MMOL/L (ref 94–109)
CO2 SERPL-SCNC: 29 MMOL/L (ref 20–32)
CREAT SERPL-MCNC: 0.98 MG/DL (ref 0.52–1.04)
DIFFERENTIAL METHOD BLD: ABNORMAL
EOSINOPHIL # BLD AUTO: 0.1 10E9/L (ref 0–0.7)
EOSINOPHIL NFR BLD AUTO: 2.2 %
ERYTHROCYTE [DISTWIDTH] IN BLOOD BY AUTOMATED COUNT: 20.2 % (ref 10–15)
GFR SERPL CREATININE-BSD FRML MDRD: 63 ML/MIN/{1.73_M2}
GLUCOSE BLDC GLUCOMTR-MCNC: 129 MG/DL (ref 70–99)
GLUCOSE BLDC GLUCOMTR-MCNC: 130 MG/DL (ref 70–99)
GLUCOSE BLDC GLUCOMTR-MCNC: 139 MG/DL (ref 70–99)
GLUCOSE BLDC GLUCOMTR-MCNC: 164 MG/DL (ref 70–99)
GLUCOSE BLDC GLUCOMTR-MCNC: 90 MG/DL (ref 70–99)
GLUCOSE SERPL-MCNC: 133 MG/DL (ref 70–99)
HCT VFR BLD AUTO: 26 % (ref 35–47)
HGB BLD-MCNC: 7.6 G/DL (ref 11.7–15.7)
IMM GRANULOCYTES # BLD: 0.1 10E9/L (ref 0–0.4)
IMM GRANULOCYTES NFR BLD: 2.5 %
LACTATE BLD-SCNC: 0.8 MMOL/L (ref 0.7–2)
LYMPHOCYTES # BLD AUTO: 0.4 10E9/L (ref 0.8–5.3)
LYMPHOCYTES NFR BLD AUTO: 10.4 %
MAGNESIUM SERPL-MCNC: 1.6 MG/DL (ref 1.6–2.3)
MCH RBC QN AUTO: 30 PG (ref 26.5–33)
MCHC RBC AUTO-ENTMCNC: 29.2 G/DL (ref 31.5–36.5)
MCV RBC AUTO: 103 FL (ref 78–100)
MONOCYTES # BLD AUTO: 0.4 10E9/L (ref 0–1.3)
MONOCYTES NFR BLD AUTO: 10.6 %
NEUTROPHILS # BLD AUTO: 2.6 10E9/L (ref 1.6–8.3)
NEUTROPHILS NFR BLD AUTO: 74 %
NRBC # BLD AUTO: 0 10*3/UL
NRBC BLD AUTO-RTO: 0 /100
PHOSPHATE SERPL-MCNC: 3.7 MG/DL (ref 2.5–4.5)
PLATELET # BLD AUTO: 140 10E9/L (ref 150–450)
POTASSIUM SERPL-SCNC: 5 MMOL/L (ref 3.4–5.3)
PROT SERPL-MCNC: 4.6 G/DL (ref 6.8–8.8)
RBC # BLD AUTO: 2.53 10E12/L (ref 3.8–5.2)
SODIUM SERPL-SCNC: 137 MMOL/L (ref 133–144)
WBC # BLD AUTO: 3.6 10E9/L (ref 4–11)

## 2019-09-03 PROCEDURE — 82248 BILIRUBIN DIRECT: CPT | Performed by: TRANSPLANT SURGERY

## 2019-09-03 PROCEDURE — 25000132 ZZH RX MED GY IP 250 OP 250 PS 637: Performed by: PHYSICIAN ASSISTANT

## 2019-09-03 PROCEDURE — 85025 COMPLETE CBC W/AUTO DIFF WBC: CPT | Performed by: TRANSPLANT SURGERY

## 2019-09-03 PROCEDURE — 97140 MANUAL THERAPY 1/> REGIONS: CPT | Mod: GO | Performed by: OCCUPATIONAL THERAPIST

## 2019-09-03 PROCEDURE — 25000131 ZZH RX MED GY IP 250 OP 636 PS 637: Performed by: TRANSPLANT SURGERY

## 2019-09-03 PROCEDURE — 12000026 ZZH R&B TRANSPLANT

## 2019-09-03 PROCEDURE — 84100 ASSAY OF PHOSPHORUS: CPT | Performed by: TRANSPLANT SURGERY

## 2019-09-03 PROCEDURE — 00000146 ZZHCL STATISTIC GLUCOSE BY METER IP

## 2019-09-03 PROCEDURE — A9585 GADOBUTROL INJECTION: HCPCS | Performed by: TRANSPLANT SURGERY

## 2019-09-03 PROCEDURE — 25500064 ZZH RX 255 OP 636: Performed by: TRANSPLANT SURGERY

## 2019-09-03 PROCEDURE — 25000132 ZZH RX MED GY IP 250 OP 250 PS 637: Performed by: SURGERY

## 2019-09-03 PROCEDURE — 25800025 ZZH RX 258: Performed by: PHYSICIAN ASSISTANT

## 2019-09-03 PROCEDURE — 40000556 ZZH STATISTIC PERIPHERAL IV START W US GUIDANCE

## 2019-09-03 PROCEDURE — 70553 MRI BRAIN STEM W/O & W/DYE: CPT

## 2019-09-03 PROCEDURE — 25000132 ZZH RX MED GY IP 250 OP 250 PS 637: Performed by: TRANSPLANT SURGERY

## 2019-09-03 PROCEDURE — 80053 COMPREHEN METABOLIC PANEL: CPT | Performed by: TRANSPLANT SURGERY

## 2019-09-03 PROCEDURE — 97110 THERAPEUTIC EXERCISES: CPT | Mod: GP

## 2019-09-03 PROCEDURE — 83735 ASSAY OF MAGNESIUM: CPT | Performed by: TRANSPLANT SURGERY

## 2019-09-03 PROCEDURE — 83605 ASSAY OF LACTIC ACID: CPT | Performed by: TRANSPLANT SURGERY

## 2019-09-03 PROCEDURE — 36592 COLLECT BLOOD FROM PICC: CPT | Performed by: TRANSPLANT SURGERY

## 2019-09-03 PROCEDURE — 97530 THERAPEUTIC ACTIVITIES: CPT | Mod: GP

## 2019-09-03 PROCEDURE — 25000128 H RX IP 250 OP 636: Performed by: TRANSPLANT SURGERY

## 2019-09-03 RX ORDER — BUMETANIDE 1 MG/1
1 TABLET ORAL DAILY
Status: DISCONTINUED | OUTPATIENT
Start: 2019-09-03 | End: 2019-09-04

## 2019-09-03 RX ORDER — PREDNISONE 5 MG/1
5 TABLET ORAL DAILY
Status: DISCONTINUED | OUTPATIENT
Start: 2019-09-04 | End: 2019-09-04

## 2019-09-03 RX ORDER — GADOBUTROL 604.72 MG/ML
10 INJECTION INTRAVENOUS ONCE
Status: COMPLETED | OUTPATIENT
Start: 2019-09-03 | End: 2019-09-03

## 2019-09-03 RX ADMIN — SALINE NASAL SPRAY 1 SPRAY: 1.5 SOLUTION NASAL at 08:05

## 2019-09-03 RX ADMIN — TRAMADOL HYDROCHLORIDE 50 MG: 50 TABLET, COATED ORAL at 10:52

## 2019-09-03 RX ADMIN — Medication 1 PACKET: at 08:05

## 2019-09-03 RX ADMIN — SODIUM CHLORIDE 100 ML: 9 INJECTION, SOLUTION INTRAVENOUS at 18:53

## 2019-09-03 RX ADMIN — Medication 100 MG: at 08:05

## 2019-09-03 RX ADMIN — BUMETANIDE 1 MG: 1 TABLET ORAL at 10:51

## 2019-09-03 RX ADMIN — MELATONIN 1000 UNITS: at 08:05

## 2019-09-03 RX ADMIN — ASPIRIN 325 MG ORAL TABLET 325 MG: 325 PILL ORAL at 08:05

## 2019-09-03 RX ADMIN — QUETIAPINE FUMARATE 25 MG: 25 TABLET ORAL at 04:30

## 2019-09-03 RX ADMIN — CLOTRIMAZOLE 1 TROCHE: 10 LOZENGE ORAL at 16:11

## 2019-09-03 RX ADMIN — Medication 1 PACKET: at 20:30

## 2019-09-03 RX ADMIN — LEVOTHYROXINE SODIUM 125 MCG: 0.12 TABLET ORAL at 08:05

## 2019-09-03 RX ADMIN — PREDNISOLONE 5 MG: 5 TABLET ORAL at 08:05

## 2019-09-03 RX ADMIN — MYCOPHENOLATE MOFETIL 1000 MG: 200 POWDER, FOR SUSPENSION ORAL at 18:55

## 2019-09-03 RX ADMIN — SALINE NASAL SPRAY 1 SPRAY: 1.5 SOLUTION NASAL at 12:27

## 2019-09-03 RX ADMIN — MELATONIN 1000 UNITS: at 20:24

## 2019-09-03 RX ADMIN — Medication 2 DROP: at 13:58

## 2019-09-03 RX ADMIN — FAMOTIDINE 20 MG: 20 TABLET ORAL at 08:05

## 2019-09-03 RX ADMIN — CLOTRIMAZOLE 1 TROCHE: 10 LOZENGE ORAL at 12:27

## 2019-09-03 RX ADMIN — Medication 2 DROP: at 08:05

## 2019-09-03 RX ADMIN — DEXTROSE AND SODIUM CHLORIDE: 5; 450 INJECTION, SOLUTION INTRAVENOUS at 05:59

## 2019-09-03 RX ADMIN — CYCLOSPORINE 300 MG: 100 CAPSULE, LIQUID FILLED ORAL at 18:55

## 2019-09-03 RX ADMIN — Medication 2 DROP: at 20:25

## 2019-09-03 RX ADMIN — CYCLOSPORINE 300 MG: 100 CAPSULE, LIQUID FILLED ORAL at 08:05

## 2019-09-03 RX ADMIN — MULTIVITAMIN 15 ML: LIQUID ORAL at 08:05

## 2019-09-03 RX ADMIN — CLOTRIMAZOLE 1 TROCHE: 10 LOZENGE ORAL at 08:05

## 2019-09-03 RX ADMIN — VALGANCICLOVIR HYDROCHLORIDE 450 MG: 50 POWDER, FOR SOLUTION ORAL at 08:05

## 2019-09-03 RX ADMIN — CLOTRIMAZOLE 1 TROCHE: 10 LOZENGE ORAL at 20:25

## 2019-09-03 RX ADMIN — GADOBUTROL 10 ML: 604.72 INJECTION INTRAVENOUS at 18:53

## 2019-09-03 RX ADMIN — TRAMADOL HYDROCHLORIDE 50 MG: 50 TABLET, COATED ORAL at 04:30

## 2019-09-03 RX ADMIN — MYCOPHENOLATE MOFETIL 1000 MG: 200 POWDER, FOR SUSPENSION ORAL at 08:05

## 2019-09-03 RX ADMIN — MELATONIN TAB 3 MG 3 MG: 3 TAB at 20:25

## 2019-09-03 ASSESSMENT — ACTIVITIES OF DAILY LIVING (ADL)
ADLS_ACUITY_SCORE: 28

## 2019-09-03 NOTE — PROGRESS NOTES
Calorie Count  Intake recorded for: 9/2  Total Kcals: 165 Total Protein: 8g  Kcals from Hospital Food: 165  Protein: 8g  Kcals from Outside Food (average):0 Protein: 0g  # Meals Recorded: 100% applesauce, 75% chicken noodle soup, less than 25% penne pasta w/ marinara sauce   # Supplements Recorded: 0

## 2019-09-03 NOTE — PLAN OF CARE
/76 (BP Location: Right arm)   Pulse 96   Temp 98.1  F (36.7  C) (Oral)   Resp 20   Wt 94 kg (207 lb 3.7 oz)   SpO2 94%   BMI 37.90 kg/m   AVSS on RA. Pt remains oriented to self only Patient denies pain and nausea. . Urine Output - incontinent x1, voided on the bedpan x1. Bowel Function - no BM this shift. Nutrition - TF stopped for MRI, regular diet w/ poor PO intake. Drains - OBED w/ good amount of serous output. Activity -transfers from chair to bed using lift. Plan of Care - pt currently having brain MRI completed. Will continue to monitor and update team w/ changes.

## 2019-09-03 NOTE — PROGRESS NOTES
At about 0000, with patient's WBC 3.5 and , Sepsis protocol activated. MD on call notified, Lactic acid drawn which was 0.8. No intervention ordered. Continue to monitor.

## 2019-09-03 NOTE — PROGRESS NOTES
CLINICAL NUTRITION SERVICES - BRIEF NOTE    5 day calorie count average (8/29-9/2) = 141 kcal, 5 grams protein (5-10% of assessed needs)    Continue TF as ordered for now.    No need to continue calorie counts unless there is a noticeable change in eating habits.      Monitoring/Evaluation  Progress toward goals will be monitored and evaluated per protocol.    Lela Ng MS, RD, LD  Pager [phone]

## 2019-09-03 NOTE — PLAN OF CARE
AVSS, reporting generalized pain, and tramadol given x1.  Blood qbssaxtb=107, 164  Patient continues to be only oriented to self, VPM and bed alarm placed for patient safety - no restraints at this time.  Patient up to chair for majority of day, incontinent of urine and stool, purewick in place, poor appetite (total feed with a few bites at breakfast), NJ with tube feeds at 45ml/hr, and OBED to bulb suction with large output.  Scheduled meds given as ordered.  Continue to monitor, treat as ordered, notify team with changes.  Patient to have MRI this afternoon/evening.

## 2019-09-03 NOTE — PLAN OF CARE
Edema 7A: Recommend continued use of LE compression wraps to further manage soft 1+/2+ pitting edema in B LEs. Reapplication of GCB from MTPs to knee creases. Remove wraps if causing pain/numbness or become soiled.

## 2019-09-03 NOTE — PLAN OF CARE
OT:  AM, checked in but needing nursing cares prior to therapy.  PM, patient up in chair and sleeping.  Tried to awaken and patient opened eyes twice but then fell back to sleep.  Nursing notified.

## 2019-09-03 NOTE — PROGRESS NOTES
Immunosuppression Note:    Nicci Pemberton is a 59 year old female who is seen today  for immunosuppression management     I, Mandeep Alford MD, I have examined the patient with the resident/PA/Fellow, discussed and agree with the note and findings.  I have reviewed today's vital signs, medications, labs and imaging. I reviewed the immunosuppression medications and levels. I spoke to the patient/family and explained below clinical details and answered all the questions      Transplant Surgery  Inpatient Daily Progress Note  09/03/2019    Assessment & Plan: Nicci Pemberton is a 60 yo female with a past medical history significant for end stage liver disease 2/2 ANGULO and alpha 1 anti-trypsin deficiency now s/p DD OLT on 8/18/19    Graft function: Good, AST and ALT now stable. Tbili improved to 1.3. R drain removed, L drain output still high, will leave at this time.    Immunosuppression management: Tac now stopped due to prolonged delirium, CSA started at 300 mg BID. MMF increased to 1000 mg BID. Re-started pred 5mg due to transition between meds. Will check first cyclosporine level tomorrow. Complexity of management: Medium. Contributing factors: anemia and induction     Hematology: Acute blood loss anemia. Transfusion goal hgb > 7. Hgb stable at this time. Last transfused 3 units 8/20 overnight.    HEENT: Post-op nasal bleeding, now resolved. Re-advanced feeding tube 8/28, currently in good position.    Examination revealed no direct lesion, overall friable mucosa. Re-packed 8/21. Re-bleeding with feeding tube placement 8/22, ENT re-packed around tube.     Cardiorespiratory: Patient extubated 8/24  Hx of hypertension on spironolactone and bumex at home. BPs stable. Will restart bumex 1 mg daily at this time for edema.    Neuro: Toxic and metabolic encephalopathy likely secondary to poor sleep, drugs (tac, pain medication), acute illness. Ordered melatonin and bedtime seroquel to help patient sleep, patient's mental  status has been improving overall. Per nursing, did get more agitated after oxycodone dose overnight, will discontinue oxycodone and order tramadol PRN. Lidoderm patches also ordered. Will change seroquel to PRN at bedtime. Continue scheduled melatonin. MRI head to be completed today, switched from cyclosporine to prograf.    GI/Nutrition: NJ now in good position, tube feeds going at goal.  Pt passed swallow eval, diet ordered but minimal intake.   Endocrine: Steroid induced hyperglycemia, resolving, on sliding scale insulin.  Fluid/Electrolytes: GAMAL- CRRT stopped patient will get HD as needed, so far no indication. Incontinent of urine, so difficult to quantify, but Cr now normalized, 0.98. Will restart bumex 1 mg daily.   : Incontinent  Infectious disease: Ppx with micafungin x 10 days completed, zosyn completed. PPx with dapsone and valcyte.  Prophylaxis: DVT, fall, GI, fungal  Disposition: 7A     Medical Decision Making: Medium  Subsequent visit 67580 (moderate level decision making)    VEENA/Fellow/Resident Provider: Mary Braxton PA-C [id number]    Faculty: NATHALY Alford MD    ________________________________________________________________  Transplant History: Admitted 8/16/2019 for acute decompensated ANGULO.   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    8/18/2019 (Liver), Postoperative day: 16     Interval History:   No abdominal pain, no nausea/vomiting. She is passing flatus and having BMs, continues to be confused. Mental status continues to wax and wane. Mental status late morning appears to be improved from yesterday.    ROS:   A 10-point review of systems was negative except as noted above.    Curent Meds:    aspirin  325 mg Oral Daily     bumetanide  1 mg Oral Daily     carboxymethylcellulose PF  2 drop Both Eyes Q6H     clotrimazole  1 Molly Buccal 4x Daily     cycloSPORINE modified  300 mg Oral BID IS     dapsone  100 mg Oral Daily     famotidine  20 mg Oral  Daily     levothyroxine  125 mcg Oral Daily     melatonin  3 mg Oral At Bedtime     multivitamins w/minerals  15 mL Per Feeding Tube Daily     mycophenolate  1,000 mg Oral BID IS    Or     mycophenolate  1,000 mg Oral or NG Tube BID IS     polyethylene glycol  17 g Oral BID     prednisoLONE  5 mg Oral Daily     protein modular  1 packet Per Feeding Tube BID     senna-docusate  2 tablet Oral BID     sodium chloride  1 spray Both Nostrils Q4H     sodium chloride (PF)  3 mL Intravenous Q8H     sodium chloride (PF)  3 mL Intracatheter Q8H     valGANciclovir  450 mg Oral Daily    Or     valGANciclovir  450 mg Oral or NG Tube Daily     vitamin D3  1,000 Units Oral BID       Physical Exam:     Admit Weight: 104.3 kg (230 lb)    Current Vitals:   /83 (BP Location: Left arm)   Pulse 96   Temp 97.4  F (36.3  C) (Oral)   Resp 20   Wt 92 kg (202 lb 13.2 oz)   SpO2 95%   BMI 37.10 kg/m      Vital sign ranges:    Temp:  [97.3  F (36.3  C)-99.5  F (37.5  C)] 97.4  F (36.3  C)  Pulse:  [96] 96  Heart Rate:  [] 105  Resp:  [20] 20  BP: ()/(60-83) 123/83  SpO2:  [92 %-95 %] 95 %  Patient Vitals for the past 24 hrs:   BP Temp Temp src Pulse Heart Rate Resp SpO2   09/03/19 0800 123/83 97.4  F (36.3  C) Oral 96 -- 20 95 %   09/03/19 0400 110/60 98.5  F (36.9  C) Oral -- 105 20 92 %   09/03/19 0000 116/71 99.5  F (37.5  C) Oral -- 107 20 93 %   09/02/19 1644 102/67 98.5  F (36.9  C) Oral -- 94 20 92 %   09/02/19 1344 93/71 97.3  F (36.3  C) Oral -- 96 20 92 %     General Appearance: Awake, arousable, somewhat confused  HEENT: Feeding tube in nare  Skin: normal, warm, scattered bruising  Heart: NSR  Lungs: NLB on RA   Abdomen: soft, nondistended, incision sutured, c/d/i. OBED x 1 with serous output  : Wearing depends  Extremities: edema: present bilaterally. 3+.  Neurologic: Alert     Frailty Scores     There is no flowsheet data to display.          Data:   CMP  Recent Labs   Lab 09/03/19  0704 09/02/19  0657   NA  137 138   POTASSIUM 5.0 5.1   CHLORIDE 104 105   CO2 29 29   * 116*   BUN 21 22   CR 0.98 1.01   GFRESTIMATED 63 61   GFRESTBLACK 73 70   JACOB 8.4* 8.5   MAG 1.6 1.6   PHOS 3.7 3.8   ALBUMIN 1.8* 1.7*   BILITOTAL 1.3 1.2   ALKPHOS 121 126   AST 22 21   ALT 26 28     CBC  Recent Labs   Lab 09/03/19  0704 09/02/19  0657   HGB 7.6* 7.9*   WBC 3.6* 3.5*   * 147*     COAGS  No lab results found in last 7 days.    Invalid input(s): XA

## 2019-09-03 NOTE — PLAN OF CARE
Discharge Planner PT  7A  Patient plan for discharge: Not stated  Current status: Pt continues to be limited by confusion and lethargy this date. ModAx2 required for supine>sit, CGA for seated scooting at EOB. Sit<>stand performed x3 at EOB with modAx2 and FWW, verbal and tactile cues required for upright posture. Stand pivot transfer performed EOB>recliner with modAx2.   Barriers to return to prior living situation: Medical status, level of assist, impaired cognition, weakness, decreased activity tolerance, impaired balance  Recommendations for discharge: TCU  Rationale for recommendations: Pt significantly below baseline in regards to functional mobility, would benefit from continued skilled therapy services to progress strength, endurance, balance, and safety and independence with functional mobility.       Entered by: Naida Khalil 09/03/2019 10:09 AM

## 2019-09-03 NOTE — PLAN OF CARE
VS: Tmax 99.5(ax), 's/60-70's, 's, Os 2 sat 92-93% on room air.  LDA: Right internal jugular TLC with D51/2NS@50cc/hr, and saline locked.  Neuro:  Only oriented to self, hallucinating and agitated. Seroquel given x1, slept for 1-2hours.   GI/: Had 100cc urine output via Purewick, incontinent of stool x2 which were loose.  Diet/appetite: Regular diet and calorie count, blood glucose 139, no sliding scale given.  Activity: Chair fast with mechanical lift.  Pain/Nausea/Vomiting: Ultram given x1 with bilateral LE pain. Denies nausea.  Skin: Surgical incision sutured c/d/i. Left OBED with moderate of serous output.  Mobility: 2 person assist and lift.  Test/Procedure: MRI of head today.  Plan: Continue plan of cares.

## 2019-09-04 ENCOUNTER — APPOINTMENT (OUTPATIENT)
Dept: PHYSICAL THERAPY | Facility: CLINIC | Age: 59
DRG: 005 | End: 2019-09-04
Payer: COMMERCIAL

## 2019-09-04 ENCOUNTER — ALLIED HEALTH/NURSE VISIT (OUTPATIENT)
Dept: NEUROLOGY | Facility: CLINIC | Age: 59
DRG: 005 | End: 2019-09-04
Attending: PSYCHIATRY & NEUROLOGY
Payer: COMMERCIAL

## 2019-09-04 DIAGNOSIS — R41.0 DELIRIUM: Primary | ICD-10-CM

## 2019-09-04 LAB
ALBUMIN SERPL-MCNC: 1.7 G/DL (ref 3.4–5)
ALP SERPL-CCNC: 122 U/L (ref 40–150)
ALT SERPL W P-5'-P-CCNC: 27 U/L (ref 0–50)
AMMONIA PLAS-SCNC: 23 UMOL/L (ref 10–50)
ANION GAP SERPL CALCULATED.3IONS-SCNC: 5 MMOL/L (ref 3–14)
ANISOCYTOSIS BLD QL SMEAR: ABNORMAL
AST SERPL W P-5'-P-CCNC: 24 U/L (ref 0–45)
BASOPHILS # BLD AUTO: 0 10E9/L (ref 0–0.2)
BASOPHILS NFR BLD AUTO: 0.9 %
BILIRUB DIRECT SERPL-MCNC: 1.2 MG/DL (ref 0–0.2)
BILIRUB SERPL-MCNC: 1.5 MG/DL (ref 0.2–1.3)
BUN SERPL-MCNC: 23 MG/DL (ref 7–30)
CALCIUM SERPL-MCNC: 8.3 MG/DL (ref 8.5–10.1)
CHLORIDE SERPL-SCNC: 103 MMOL/L (ref 94–109)
CO2 SERPL-SCNC: 30 MMOL/L (ref 20–32)
CREAT SERPL-MCNC: 1.07 MG/DL (ref 0.52–1.04)
CYCLOSPORINE BLD LC/MS/MS-MCNC: 455 UG/L (ref 50–400)
DIFFERENTIAL METHOD BLD: ABNORMAL
EOSINOPHIL # BLD AUTO: 0 10E9/L (ref 0–0.7)
EOSINOPHIL NFR BLD AUTO: 0.9 %
ERYTHROCYTE [DISTWIDTH] IN BLOOD BY AUTOMATED COUNT: 20.7 % (ref 10–15)
GFR SERPL CREATININE-BSD FRML MDRD: 57 ML/MIN/{1.73_M2}
GLUCOSE BLDC GLUCOMTR-MCNC: 124 MG/DL (ref 70–99)
GLUCOSE SERPL-MCNC: 113 MG/DL (ref 70–99)
HCT VFR BLD AUTO: 25 % (ref 35–47)
HGB BLD-MCNC: 7.6 G/DL (ref 11.7–15.7)
INR PPP: 1.11 (ref 0.86–1.14)
LACTATE BLD-SCNC: 1 MMOL/L (ref 0.7–2)
LYMPHOCYTES # BLD AUTO: 0.3 10E9/L (ref 0.8–5.3)
LYMPHOCYTES NFR BLD AUTO: 9.7 %
MAGNESIUM SERPL-MCNC: 1.5 MG/DL (ref 1.6–2.3)
MCH RBC QN AUTO: 30.9 PG (ref 26.5–33)
MCHC RBC AUTO-ENTMCNC: 30.4 G/DL (ref 31.5–36.5)
MCV RBC AUTO: 102 FL (ref 78–100)
MONOCYTES # BLD AUTO: 0.3 10E9/L (ref 0–1.3)
MONOCYTES NFR BLD AUTO: 9.7 %
MYELOCYTES # BLD: 0.1 10E9/L
MYELOCYTES NFR BLD MANUAL: 1.8 %
NEUTROPHILS # BLD AUTO: 2.5 10E9/L (ref 1.6–8.3)
NEUTROPHILS NFR BLD AUTO: 77 %
PHOSPHATE SERPL-MCNC: 4.5 MG/DL (ref 2.5–4.5)
PLATELET # BLD AUTO: 144 10E9/L (ref 150–450)
PLATELET # BLD EST: ABNORMAL 10*3/UL
POTASSIUM SERPL-SCNC: 4.6 MMOL/L (ref 3.4–5.3)
PROT SERPL-MCNC: 4.7 G/DL (ref 6.8–8.8)
RBC # BLD AUTO: 2.46 10E12/L (ref 3.8–5.2)
SODIUM SERPL-SCNC: 138 MMOL/L (ref 133–144)
TME LAST DOSE: ABNORMAL H
WBC # BLD AUTO: 3.2 10E9/L (ref 4–11)

## 2019-09-04 PROCEDURE — 82140 ASSAY OF AMMONIA: CPT | Performed by: STUDENT IN AN ORGANIZED HEALTH CARE EDUCATION/TRAINING PROGRAM

## 2019-09-04 PROCEDURE — 83735 ASSAY OF MAGNESIUM: CPT | Performed by: TRANSPLANT SURGERY

## 2019-09-04 PROCEDURE — 00000146 ZZHCL STATISTIC GLUCOSE BY METER IP

## 2019-09-04 PROCEDURE — 84100 ASSAY OF PHOSPHORUS: CPT | Performed by: TRANSPLANT SURGERY

## 2019-09-04 PROCEDURE — 25000132 ZZH RX MED GY IP 250 OP 250 PS 637: Performed by: PHYSICIAN ASSISTANT

## 2019-09-04 PROCEDURE — 36592 COLLECT BLOOD FROM PICC: CPT | Performed by: TRANSPLANT SURGERY

## 2019-09-04 PROCEDURE — 95951 ZZHC EEG VIDEO < 12 HR: CPT | Mod: 52,ZF

## 2019-09-04 PROCEDURE — 80158 DRUG ASSAY CYCLOSPORINE: CPT | Performed by: PHYSICIAN ASSISTANT

## 2019-09-04 PROCEDURE — 25000131 ZZH RX MED GY IP 250 OP 636 PS 637: Performed by: PHYSICIAN ASSISTANT

## 2019-09-04 PROCEDURE — 25000132 ZZH RX MED GY IP 250 OP 250 PS 637: Performed by: TRANSPLANT SURGERY

## 2019-09-04 PROCEDURE — 12000026 ZZH R&B TRANSPLANT

## 2019-09-04 PROCEDURE — 25000131 ZZH RX MED GY IP 250 OP 636 PS 637: Performed by: TRANSPLANT SURGERY

## 2019-09-04 PROCEDURE — 25000132 ZZH RX MED GY IP 250 OP 250 PS 637: Performed by: SURGERY

## 2019-09-04 PROCEDURE — 97530 THERAPEUTIC ACTIVITIES: CPT | Mod: GP

## 2019-09-04 PROCEDURE — 85025 COMPLETE CBC W/AUTO DIFF WBC: CPT | Performed by: TRANSPLANT SURGERY

## 2019-09-04 PROCEDURE — 82248 BILIRUBIN DIRECT: CPT | Performed by: TRANSPLANT SURGERY

## 2019-09-04 PROCEDURE — 80053 COMPREHEN METABOLIC PANEL: CPT | Performed by: TRANSPLANT SURGERY

## 2019-09-04 PROCEDURE — 36592 COLLECT BLOOD FROM PICC: CPT | Performed by: STUDENT IN AN ORGANIZED HEALTH CARE EDUCATION/TRAINING PROGRAM

## 2019-09-04 PROCEDURE — 83605 ASSAY OF LACTIC ACID: CPT | Performed by: TRANSPLANT SURGERY

## 2019-09-04 PROCEDURE — 85610 PROTHROMBIN TIME: CPT | Performed by: PHYSICIAN ASSISTANT

## 2019-09-04 PROCEDURE — 27210429 ZZH NUTRITION PRODUCT INTERMEDIATE LITER

## 2019-09-04 RX ORDER — CYCLOSPORINE 100 MG/ML
300 SOLUTION ORAL
Status: DISCONTINUED | OUTPATIENT
Start: 2019-09-04 | End: 2019-09-04

## 2019-09-04 RX ORDER — BUMETANIDE 1 MG/1
1 TABLET ORAL
Status: DISCONTINUED | OUTPATIENT
Start: 2019-09-04 | End: 2019-09-06

## 2019-09-04 RX ORDER — CYCLOSPORINE 100 MG/ML
150 SOLUTION ORAL
Status: DISCONTINUED | OUTPATIENT
Start: 2019-09-05 | End: 2019-09-08

## 2019-09-04 RX ADMIN — MELATONIN 1000 UNITS: at 20:48

## 2019-09-04 RX ADMIN — SALINE NASAL SPRAY 1 SPRAY: 1.5 SOLUTION NASAL at 13:25

## 2019-09-04 RX ADMIN — LEVOTHYROXINE SODIUM 125 MCG: 0.12 TABLET ORAL at 09:06

## 2019-09-04 RX ADMIN — VALGANCICLOVIR HYDROCHLORIDE 450 MG: 50 POWDER, FOR SOLUTION ORAL at 09:06

## 2019-09-04 RX ADMIN — FAMOTIDINE 20 MG: 20 TABLET ORAL at 09:06

## 2019-09-04 RX ADMIN — MELATONIN 1000 UNITS: at 09:06

## 2019-09-04 RX ADMIN — QUETIAPINE FUMARATE 25 MG: 25 TABLET ORAL at 05:46

## 2019-09-04 RX ADMIN — CLOTRIMAZOLE 1 TROCHE: 10 LOZENGE ORAL at 16:13

## 2019-09-04 RX ADMIN — CLOTRIMAZOLE 1 TROCHE: 10 LOZENGE ORAL at 13:25

## 2019-09-04 RX ADMIN — MELATONIN TAB 3 MG 3 MG: 3 TAB at 20:48

## 2019-09-04 RX ADMIN — SALINE NASAL SPRAY 1 SPRAY: 1.5 SOLUTION NASAL at 09:07

## 2019-09-04 RX ADMIN — ASPIRIN 325 MG ORAL TABLET 325 MG: 325 PILL ORAL at 09:06

## 2019-09-04 RX ADMIN — SALINE NASAL SPRAY 1 SPRAY: 1.5 SOLUTION NASAL at 23:45

## 2019-09-04 RX ADMIN — MULTIVITAMIN 15 ML: LIQUID ORAL at 09:06

## 2019-09-04 RX ADMIN — MYCOPHENOLATE MOFETIL 1000 MG: 200 POWDER, FOR SUSPENSION ORAL at 18:52

## 2019-09-04 RX ADMIN — SALINE NASAL SPRAY 1 SPRAY: 1.5 SOLUTION NASAL at 16:13

## 2019-09-04 RX ADMIN — PREDNISONE 5 MG: 5 TABLET ORAL at 09:17

## 2019-09-04 RX ADMIN — Medication 100 MG: at 09:06

## 2019-09-04 RX ADMIN — BUMETANIDE 1 MG: 1 TABLET ORAL at 09:06

## 2019-09-04 RX ADMIN — CYCLOSPORINE 300 MG: 100 SOLUTION ORAL at 09:49

## 2019-09-04 RX ADMIN — CLOTRIMAZOLE 1 TROCHE: 10 LOZENGE ORAL at 09:06

## 2019-09-04 RX ADMIN — MYCOPHENOLATE MOFETIL 1000 MG: 200 POWDER, FOR SUSPENSION ORAL at 09:09

## 2019-09-04 RX ADMIN — Medication 2 DROP: at 13:25

## 2019-09-04 RX ADMIN — Medication 1 PACKET: at 09:07

## 2019-09-04 RX ADMIN — BUMETANIDE 1 MG: 1 TABLET ORAL at 16:13

## 2019-09-04 RX ADMIN — Medication 1 PACKET: at 20:48

## 2019-09-04 RX ADMIN — Medication 2 DROP: at 09:07

## 2019-09-04 ASSESSMENT — ACTIVITIES OF DAILY LIVING (ADL)
ADLS_ACUITY_SCORE: 28

## 2019-09-04 NOTE — PLAN OF CARE
Discharge Planner PT  7A  Patient plan for discharge: Not stated  Current status: Pt appears more confused this date, intermittently tearful and frustrated. Frequent cueing and redirection required, poor command following throughout. ModA for supine>sit via log roll technique, modAx2 for stand pivot transfer EOB>BSC. Total A required for pericares in standing. Pt transfers to chair at end of session via sit<>stand with BSC and recliner switch out 2/2 increasing agitation difficulty following commands indicating likely unsafe pivot transfer.  Barriers to return to prior living situation: Medical status, impaired cognition, decreased activity tolerance, abdominal precautions, weakness, impaired balance  Recommendations for discharge: TCU  Rationale for recommendations: Pt would benefit from continued skilled therapy services to progress strength, endurance, balance, and safety and independence with functional mobility.       Entered by: Naida Khalil 09/04/2019 10:26 AM

## 2019-09-04 NOTE — PLAN OF CARE
AVSS and denies pain  Patient continues to be only oriented to self, VPM and bed alarm placed for patient safety - no restraints at this time.  Patient up to chair for half of day, incontinent of urine and stool, purewick in place now, no appetite today, NJ with tube feeds now at 50ml/hr, and OBED to bulb suction with large output.  3hr video EEG on (due to be done ~1530 page [phone] with issues).  Scheduled meds given as ordered.  Continue to monitor, treat as ordered, notify team with changes.  Patient to have LP.

## 2019-09-04 NOTE — PLAN OF CARE
OT: Per discussion w/ PT, pt w/ increase agitation w/ stimulation/direction follow, per RN pt EEG leads not glued on and required continual monitoring. OT will cx this afternoon to avoid any displacement of EEG leads.

## 2019-09-04 NOTE — PLAN OF CARE
VS: Afebrile, BP 's, HR 90's, O2 sat 92-93% on room air.  LDA: Right internal jugular TLC with D51/2NS@50cc/hr, left PIV saline locked.  Neuro: Oriented to self, patient had been sleeping most of the shift.  At about 0500, started hallucinating/confused and agitated, Seroquel given x1.  GI/: Voiding adequate amount using the bedpan, loose BM x1.  Diet/appetite: Regular diet, poor PO intake, blood glucose 90 and 124, no sliding scale given.  Pain/Nausea/Vomiting: Denies pain and nausea.  Skin: Surgical incision sutured c/d/i.  Mobility: 2 person assist.  Test/Procedure: N/A.  Plan: Continue plan of cares.

## 2019-09-04 NOTE — CONSULTS
PATIENT SEEN AND EXAMINED BY ME on September 4, 2019 I AGREE WITH THE FINDINGS DETAILED BY THE NEUROLOGY RESIDENT and documented in their note on September 4, 2019   PLEASE REFER TO THEIR NOTE FOR ADDITIONAL DETAILS.       AVA LEON MD  September 4, 2019

## 2019-09-04 NOTE — PROCEDURES
[facility] EEG # (In-Patient Video-EEG Monitoring)    Name:     EARLENE PEMBERTON   MRN:      [id number]   :      1960   Procedure Date: 2019   Duration of Recording:  nn hours, nn minutes.     CLINICAL HISTORY:  2 hours, 57 minutes.      CLINICAL SUMMARY:  This diagnostic video-EEG monitoring procedure was performed in evaluation of encephalopathy in Earlene Pemberton.  She was not reported to be receiving medications known to affect electrocerebral function at the time of this recording.      TECHNICAL SUMMARY:  This continuous EEG monitoring procedure was performed with 23 scalp electrodes in 10-20 system placements, and additional scalp, precordial and other surface electrodes used for electrical referencing and artifact detection.  A single channel of EKG was recorded for purposes of analyzing EKG artifacts in the EEG channels.  Video monitoring was utilized and periodically reviewed by EEG technologist and the physician for electroclinical correlation.    INTERICTAL EEG ACTIVITIES:  During behavioral waking, there was continuous moderate amplitude irregular symmetric generalized 2-8 Hz delta-theta slowing, with symmetrically intermixed lower amplitude faster frequencies.      No interictal epileptiform abnormalities were observed.      ICTAL RECORDINGS:  No electrographic seizures and no paroxysmal behavioral events occurred during this procedure.     SUMMARY OF VIDEO-EEG MONITORING:    The interictal EEG recording during waking was abnormal due to continuous generalized delta-theta slowing.    No interictal epileptiform abnormalities, no electrographic seizures, and no paroxysmal behavioral events were recorded during the period of monitoring.    These findings indicate moderate electrographic encephalopathy, which is etiologically nonspecific.  Clinical correlation is recommended.   Doug Cook M.D., Professor of Neurology        D: 2019   T: 2019   MT: GERHARD      Name:     JANET  EARLENE   MRN:      [id number]        Account:        [id number]   :      1960           Procedure Date: 2019      Document: [id number]

## 2019-09-04 NOTE — PROGRESS NOTES
CLINICAL NUTRITION SERVICES - REASSESSMENT NOTE     Nutrition Prescription    Malnutrition Status:    Severe malnutrition in the context of acute on chronic illness    Recommendations already ordered by Registered Dietitian (RD):  Increase TF rate to 50 ml/hr.  Continue 2 pkts ProSource TF daily.  Goal regimen provides 1880 kcal (31 kcal/kg), 104 grams protein (1.7g/kg).      Will continue to send 1 Boost Plus and 1 Boost Breeze daily between meals.     Future/Additional Recommendations:  Consider Nutrisource Fiber to help bulk/slow bowel movements.     Re-order calorie counts if patient's mental status/PO intake seems to improve.      EVALUATION OF THE PROGRESS TOWARD GOALS   Diet: Regular + supplements (Boost Breeze 1x/day + Boost Plus 2x/day) between meals    TF: Nutren 1.5 @ 45 ml/hr via NDT (placed 8/28) - FT secured by tape     Intake: Over the last 6 days, patient has received an average of 1246 kcal (80% needs) and 72 grams protein (77% needs) from TF.  TF stopped for procedures.  Patient received a 7 day average from TF (8/19-8/25) of ~770 kcal and 35 grams protein (~40% of needs).  Minimal nutrition received for the ~3-4 days prior to this.      PO intake: requires total feeding assistance, eating bites when oriented.  Overall calorie counts over the last week showing patient consuming <200 kcal, 5 grams of protein (5-10% needs).       NEW FINDINGS   GI: Having several BM daily    Labs: K+ 4.6 today, has been on higher end of normal range    Weight: Significant weight loss since admission.  Current dry weight is 92 kg (overall down 13.7 kg/13% in ~2-3 weeks).  Patient had severe lymphedema pre-transplant but continues to have moderate generalized/LE edema on exam.  Likely that this weight loss is a combination of dry/fluid losses given nutrition hx (See above).      Updated assessed needs d/t this weight change.   Dosing Weight: 61 kg adjusted wt from admit wt of 98.2 kg on 8/28 and IBW of 50 kg    ASSESSED NUTRITION NEEDS  Estimated Energy Needs: 1830 kcals/day (30 kcals/kg)  Justification: Liver transplant needs, obesity   Estimated Protein Needs:  grams protein/day (1.5 - 2 grams of pro/kg)  Justification:  Post-op liver transplant  Estimated Fluid Needs:Per provider pending fluid status    MALNUTRITION  % Intake: Does not meet criteria recently, TF meeting majority of needs  % Weight Loss: > 2% in 1 week (severe)  Subcutaneous Fat Loss: Temporal, Facial region:  Mild-moderate  Muscle Loss: Temporal, Scapular bone, Thoracic region (clavicle, acromium bone, deltoid, trapezius, pectoral), Upper arm (bicep, tricep), Lower arm  (forearm):  Moderate-severe (per previous)  Fluid Accumulation/Edema: Moderate (legs, knees)  Malnutrition Diagnosis: Severe malnutrition in the context of acute on chronic illness    Previous Goals   Total avg nutritional intake to meet a minimum of 25 kcal/kg and 1.5 g PRO/kg daily (per dosing wt 62 kg).  Evaluation: Not met    Previous Nutrition Diagnosis  Inadequate protein-energy intake  Evaluation: Improving    CURRENT NUTRITION DIAGNOSIS  Inadequate protein-energy intake related to lack of post-pyloric FT access, poor PO intake as evidenced by patient receiving minimal nutritional intake over the last 3 days and ~40% of assessed needs on average daily the 7 days before this.       INTERVENTIONS  Implementation  Briefly met with patient.  Patient was able to ask what the feeding tube was for and why it was important.  After discussion, patient verbalized understanding.  Further conversation, was somewhat mumbled.  She was able to say that she was trying to consume the oral supplements and seemed to indicate that she liked them.  Patient's body was covered by several blankets and patient with limited mental capacity, therefore physical assessment was limited to facial region    Collaboration with other providers - Paged team to notify re: TF rate change  Enteral nutrition -  modify rate    Goals  Total avg nutritional intake to meet a minimum of 25 kcal/kg and 1.5 g PRO/kg daily (per dosing wt 62 kg).    Monitoring/Evaluation  Progress toward goals will be monitored and evaluated per protocol.    Lela Ng MS, RD, LD  Pager [phone]

## 2019-09-04 NOTE — PROGRESS NOTES
Medicine Procedure Note  The risks and benefits of the procedure were explained to the patient Nicci Pemberton and her  Keny Pemberton, who expressed understanding and opted to proceed.  Consent was obtained and placed in the chart.  A time out was performed.  The patient was placed in the right lateral decubitus position. The L4-5 interspace palpated and marked.  5 ml of 1% lidocaine was instilled on three attempts.  A 3.5 inch 20 gauge needle was inserted three times without success in obtaining CSF. The stylet was replaced and the needle removed.  The patient shifted frequently during procedure, making bedside LP challenging. The patient tolerated the procedure. Unsuccessful procedure communicated to cross-cover service.    Mc Juarez MD, MPH

## 2019-09-04 NOTE — PROGRESS NOTES
Immunosuppression Note:    Nicci Pemberton is a 59 year old female who is seen today  for immunosuppression management     I, Mandeep Alford MD, I have examined the patient with the resident/PA/Fellow, discussed and agree with the note and findings.  I have reviewed today's vital signs, medications, labs and imaging. I reviewed the immunosuppression medications and levels. 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 Tbili increased to 1.5. R drain removed, L drain output still high, will leave at this time.    Immunosuppression management: Tac now stopped due to prolonged delirium, CSA started at 300 mg BID. MMF increased to 1000 mg BID. Re-started pred 5mg due to transition between meds. Will check first cyclosporine level today. Complexity of management: Medium. Contributing factors: anemia and induction     Hematology: Acute blood loss anemia. Transfusion goal hgb > 7. Hgb stable at this time. Last transfused 3 units 8/20 overnight.    HEENT: Post-op nasal bleeding, now resolved. Re-advanced feeding tube 8/28, currently in good position.    Examination revealed no direct lesion, overall friable mucosa. Re-packed 8/21. Re-bleeding with feeding tube placement 8/22, ENT re-packed around tube.     Cardiorespiratory: Patient extubated 8/24  Hx of hypertension on spironolactone and bumex at home. BPs stable. Will restart bumex 1 mg daily at this time for edema.    Neuro: Toxic and metabolic encephalopathy likely secondary to poor sleep, drugs (tac, pain medication), acute illness. Ordered melatonin and bedtime seroquel to help patient sleep, patient's mental  status has been improving overall. Will cont seroquel to PRN at bedtime. Continue scheduled melatonin. MRI head to completed-results unremarkable , switched from cyclosporine to prograf. Will consult neurology, appreciate recommendations.    GI/Nutrition: NJ now in good position, tube feeds going at goal.  Pt passed swallow eval, diet ordered but minimal intake.     Endocrine: Steroid induced hyperglycemia, resolving, on sliding scale insulin.  Fluid/Electrolytes: GAMAL- CRRT stopped patient will get HD as needed, so far no indication. Incontinent of urine, so difficult to quantify, but Cr now normalized, 0.98. Will increase bumex to 1 mg BID.   : Incontinent  Infectious disease: Ppx with micafungin x 10 days completed, zosyn completed. PPx with dapsone and valcyte.  Prophylaxis: DVT, fall, GI, fungal  Disposition: 7A     Medical Decision Making: Medium  Subsequent visit 68219 (moderate level decision making)    VEENA/Fellow/Resident Provider: Renee Allen MD Fellow [id number]    Faculty: NATHALY Alford MD    ________________________________________________________________  Transplant History: Admitted 8/16/2019 for acute decompensated ANGULO.   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    8/18/2019 (Liver), Postoperative day: 17     Interval History:     Patient confused this AM, not answering questions appropriately. Having BMs. MRI was performed yesterday.       ROS:   A 10-point review of systems was negative except as noted above.    Curent Meds:    aspirin  325 mg Oral Daily     bumetanide  1 mg Oral Daily     carboxymethylcellulose PF  2 drop Both Eyes Q6H     clotrimazole  1 Molly Buccal 4x Daily     cycloSPORINE modified  300 mg Oral or Feeding Tube BID IS     dapsone  100 mg Oral Daily     famotidine  20 mg Oral Daily     levothyroxine  125 mcg Oral Daily     melatonin  3 mg Oral At Bedtime     multivitamins w/minerals  15 mL Per Feeding Tube Daily     mycophenolate  1,000 mg  Oral BID IS    Or     mycophenolate  1,000 mg Oral or NG Tube BID IS     polyethylene glycol  17 g Oral BID     predniSONE  5 mg Oral Daily     protein modular  1 packet Per Feeding Tube BID     senna-docusate  2 tablet Oral BID     sodium chloride  1 spray Both Nostrils Q4H     sodium chloride (PF)  3 mL Intravenous Q8H     sodium chloride (PF)  3 mL Intracatheter Q8H     valGANciclovir  450 mg Oral Daily    Or     valGANciclovir  450 mg Oral or NG Tube Daily     vitamin D3  1,000 Units Oral BID       Physical Exam:     Admit Weight: 104.3 kg (230 lb)    Current Vitals:   BP 97/80 (BP Location: Right arm)   Pulse 97   Temp 98  F (36.7  C)   Resp 20   Wt 92 kg (202 lb 13.2 oz)   SpO2 94%   BMI 37.10 kg/m      Vital sign ranges:    Temp:  [97.7  F (36.5  C)-98.6  F (37  C)] 98  F (36.7  C)  Pulse:  [94-97] 97  Heart Rate:  [] 101  Resp:  [20] 20  BP: ()/(56-84) 97/80  SpO2:  [92 %-94 %] 94 %  Patient Vitals for the past 24 hrs:   BP Temp Temp src Pulse Heart Rate Resp SpO2 Weight   09/04/19 0906 97/80 -- -- -- 101 -- 94 % --   09/04/19 0718 99/56 98  F (36.7  C) -- 97 -- 20 94 % --   09/04/19 0341 107/67 97.7  F (36.5  C) Axillary -- 94 20 92 % 92 kg (202 lb 13.2 oz)   09/04/19 0022 99/83 -- -- -- -- -- -- --   09/04/19 0021 96/69 98.6  F (37  C) Axillary 94 -- 20 93 % --   09/03/19 1912 99/84 97.7  F (36.5  C) Oral -- 91 20 94 % --   09/03/19 1554 101/76 98.1  F (36.7  C) Oral -- 97 20 94 % --   09/03/19 1300 -- -- -- -- -- -- -- 94 kg (207 lb 3.7 oz)   09/03/19 1225 103/80 98.3  F (36.8  C) Oral -- 93 20 94 % --     General Appearance: Awake, arousable, somewhat confused  HEENT: Feeding tube in nare  Skin: normal, warm, scattered bruising  Heart: NSR  Lungs: NLB on RA   Abdomen: soft, nondistended, incision sutured, c/d/i. OBED x 1 with serous output  : Wearing depends  Extremities: edema: present bilaterally. 3+.  Neurologic: Alert     Frailty Scores     There is no flowsheet data to display.           Data:   CMP  Recent Labs   Lab 09/04/19  0647 09/03/19  0704    137   POTASSIUM 4.6 5.0   CHLORIDE 103 104   CO2 30 29   * 133*   BUN 23 21   CR 1.07* 0.98   GFRESTIMATED 57* 63   GFRESTBLACK 66 73   JACOB 8.3* 8.4*   MAG 1.5* 1.6   PHOS 4.5 3.7   ALBUMIN 1.7* 1.8*   BILITOTAL 1.5* 1.3   ALKPHOS 122 121   AST 24 22   ALT 27 26     CBC  Recent Labs   Lab 09/04/19  0647 09/03/19  0704   HGB 7.6* 7.6*   WBC 3.2* 3.6*   * 140*         COAGS  No lab results found in last 7 days.    Invalid input(s): XA

## 2019-09-04 NOTE — PROVIDER NOTIFICATION
CRITICAL CSA LEVEL  D:  Called by drug analysis regarding critical DPZ=209  I:  Miranda JOSHI and Mary Braxton PA-C notified and made aware.  A:  Patient otherwise stable.  P:  Continue to monitor, treat as ordered, notify team with changes.

## 2019-09-04 NOTE — CONSULTS
[facility]  Neurology Inpatient Consultation    Patient Name:  Nicci Pemberton  MRN:  [id number]    :  1960  Date of Service:  2019  Primary care provider:  Wale Thurston      Neurology consultation service was asked to see Nicci Pemberton by Dr. Alford to evaluate for altered mental status.    Impression  59 year old female h/o ANGULO cirrhosis c/b recent infection with SBP and infectious enterocolitis who was admitted 2019 with generalized weakness and fatigue. She eventually underwent DD OLT on , currently with good graft function. Neurology consulted 19 for ongoing encephalopathy.    # Encephalopathy, metabolic/toxic  Ddx broad but includes ongoing hepatic and uremic encephalopathy, patient has mild asterixis and MRI brain on 9/3 showed questionable findings of acute on chronic hepatic/uremic encephalopathy. Had been initially on tacrolimus, then switched to cyclosporine due to delirium. Pt is also on MMF and prednisone. No documented lit seizure-like activity, no focal deficits to suspect stroke. EEG today showed diffuse slowing consistent with nonspecific encephalopathy. Given transplant status on chronic immunosuppression, certainly at risk for CNS related infection, so would recommend LP at this time.     Recommendations  - please obtain at least 12 ml of CSF from LP, we will order relevant CSF labs  - avoid CNS acting drugs  - treat underlying infection, correct metabolic derangements as able  - agree with continue melatonin and Seroquel for insomnia/delirium  - on intermittent HD for uremia  - check ammonia (ordered for you)  - delirium precautions (order set): frequent reorientations, normal day night cycles (blinds up and awake during day, sleeping at night), etc.      Thank you for involving neurology in the care of Nicci Pemberton.  Please do not hesitate to call with questions/concerns (consult pager [phone]).      Patient was  seen and discussed with Dr. Nguyen who agrees with the assessment and plan.    Shankar Carney MD  Internal Medicine PGY-3  [phone]    PATIENT SEEN AND EXAMINED BY ME on September 4, 2019 I AGREE WITH THE FINDINGS DETAILED BY THE NEUROLOGY RESIDENT and documented in their note on September 4, 2019   PLEASE REFER TO THEIR NOTE FOR ADDITIONAL DETAILS.       AVA NGUYEN MD  September 4, 2019    ==================    History of Present Illness:   59 year old female h/o ANGULO cirrhosis c/b recent infection with SBP and infectious enterocolitis who was admitted 8/16/2019 with generalized weakness and fatigue. She eventually underwent DD OLT on 8/18, currently with good graft function. Neurology consulted 09/04/19 for encephalopathy.    Per chart review, pt was recently discharged from OSH to TCU in early August after treatment for SBP and infectious enterocolitis. She had been doing well for first few days, but then had many watery, green bowel movements (up to 16) with associated abdominal pain, so lactulose had been reduced from 3 to 2 times a day. She subsequently started feeling weak, decreased appetite and felt more sleepy. Here she was found to have systolic BP in the 80's, elevated BUN and Cr, ammonia level could not be drawn due to hemolyzed specimen.    Since her OLT on 8/18, she continues to have confusion and disorientation, having BM's. Cr and electrolytes have improved initially with CRRT and then iHD. She is currently not receiving lactulose.    ROS    Unable to perform d/t altered mental status    [facility]  Past Medical History:   Diagnosis Date     Anemia      Cellulitis      Cirrhosis of liver (H) 6/18/2018     Heterozygous alpha 1-antitrypsin deficiency (H)      High hepatic iron concentration determined by biopsy of liver      Liver cirrhosis secondary to ANGULO (H)      Lymphedema      Osteoarthritis      SBP (spontaneous bacterial peritonitis) (H) 8/2/2019 8/1/19 in      Past Surgical History:    Procedure Laterality Date     BENCH LIVER N/A 2019    Procedure: BACKBENCH PREPARATION, LIVER;  Surgeon: Mandeep Alford MD;  Location: [facility] OR     COLONOSCOPY N/A 2018    Procedure: COLONOSCOPY;  colonoscopy;  Surgeon: Hugo Patel MD;  Location: [facility] OR     TRANSPLANT LIVER RECIPIENT  DONOR N/A 2019    Procedure: TRANSPLANT, LIVER, RECIPIENT,  DONOR;  Surgeon: Mandeep Alford MD;  Location: [facility] OR       Medications     Current Facility-Administered Medications:      0.9% sodium chloride BOLUS, 1-250 mL, Intravenous, Q1H PRN, Mary Braxton PA-C     aspirin (ASA) tablet 325 mg, 325 mg, Oral, Daily, Mary Braxton PA-C, 325 mg at 19 0906     bumetanide (BUMEX) tablet 1 mg, 1 mg, Oral, BID, Renee Allen MD     carboxymethylcellulose PF (REFRESH PLUS) 0.5 % ophthalmic solution 2 drop, 2 drop, Both Eyes, Q6H, Mary Braxton PA-C, 2 drop at 19 0907     clotrimazole (MYCELEX) lozenge 1 Molly, 1 Molly, Buccal, 4x Daily, Mary Braxton PA-C, 1 Molly at 19 0906     cycloSPORINE modified (GENERIC EQUIVALENT) microemulsion solution 300 mg, 300 mg, Oral or Feeding Tube, BID IS, Mary Braxton PA-C     dapsone suspension 100 mg, 100 mg, Oral, Daily, Mary Braxton PA-C, 100 mg at 19 0906     dextrose 10 % 1,000 mL infusion, , Intravenous, Continuous PRN, Mary Braxton PA-C     dextrose 10 % 1,000 mL infusion, , Intravenous, Continuous PRN, Mary Braxton PA-C     glucose gel 15-30 g, 15-30 g, Oral, Q15 Min PRN **OR** dextrose 50 % injection 25-50 mL, 25-50 mL, Intravenous, Q15 Min PRN **OR** glucagon injection 1 mg, 1 mg, Subcutaneous, Q15 Min PRN, Mary Braxton PA-C     famotidine (PEPCID) tablet 20 mg, 20 mg, Oral, Daily, Mary Braxton PA-C, 20 mg at 19 0906     levothyroxine (SYNTHROID/LEVOTHROID) tablet 125 mcg, 125 mcg, Oral, Daily,  Mary Braxton PA-C, 125 mcg at 09/04/19 0906     lidocaine (LMX4) cream, , Topical, Q1H PRN, Mary Braxton PA-C     lidocaine (XYLOCAINE) 5 % ointment, , Topical, Q4H PRN, Mary Braxton PA-C     lidocaine 1 % 0.1-1 mL, 0.1-1 mL, Other, Q1H PRN, Mary Braxton PA-C     melatonin tablet 3 mg, 3 mg, Oral, At Bedtime, Mary Braxton PA-C, 3 mg at 09/03/19 2025     menthol (Topical Analgesic) 2.5% (BENGAY VANISHIN SCENT) 2.5 % topical gel, , Topical, Q6H PRN, Mary Braxton PA-C     multivitamins w/minerals (CERTAVITE) liquid 15 mL, 15 mL, Per Feeding Tube, Daily, Mary Braxton PA-C, 15 mL at 09/04/19 0906     mycophenolate (GENERIC EQUIVALENT) capsule 1,000 mg, 1,000 mg, Oral, BID IS, 1,000 mg at 09/02/19 1803 **OR** mycophenolate (CELLCEPT BRAND) suspension 1,000 mg, 1,000 mg, Oral or NG Tube, BID IS, Mandeep Alford MD, 1,000 mg at 09/04/19 0909     naloxone (NARCAN) injection 0.1-0.4 mg, 0.1-0.4 mg, Intravenous, Q2 Min PRN, Mary Braxton PA-C     ondansetron (ZOFRAN) injection 4 mg, 4 mg, Intravenous, Q6H PRN, Juliet Philippe PA-C, 4 mg at 08/27/19 1711     ondansetron (ZOFRAN-ODT) ODT tab 4 mg, 4 mg, Oral, Q6H PRN, Juliet Philippe PA-C     oxymetazoline (AFRIN) 0.05 % spray 2 spray, 2 spray, Both Nostrils, BID PRN, Mary Braxton PA-C, 2 spray at 08/31/19 0812     polyethylene glycol (MIRALAX/GLYCOLAX) Packet 17 g, 17 g, Oral, BID, Mary Braxton PA-C, 17 g at 09/01/19 1009     predniSONE (DELTASONE) tablet 5 mg, 5 mg, Oral, Daily, Mary Braxton PA-C, 5 mg at 09/04/19 0917     protein modular (PROSOURCE TF) 1 packet, 1 packet, Per Feeding Tube, BID, Renee Allen MD, 1 packet at 09/04/19 0907     QUEtiapine (SEROquel) half-tab 12.5 mg, 12.5 mg, Oral, BID PRN, Mary Braxton PA-C, 12.5 mg at 09/02/19 0444     QUEtiapine (SEROquel) tablet 25 mg, 25 mg, Oral, At Bedtime  PRN, Mary Braxton PA-C, 25 mg at 19 0546     senna-docusate (SENOKOT-S/PERICOLACE) 8.6-50 MG per tablet 2 tablet, 2 tablet, Oral, BID, Mary Braxton PA-C, 2 tablet at 19 0807     sodium chloride (OCEAN) 0.65 % nasal spray 1 spray, 1 spray, Both Nostrils, Q4H, Mary Braxton PA-C, 1 spray at 19 0907     sodium chloride (OCEAN) 0.65 % nasal spray 2 spray, 2 spray, Both Nostrils, BID PRN, Mary Braxton PA-C     sodium chloride (PF) 0.9% PF flush 3 mL, 3 mL, Intravenous, Q1H PRN, Mary Braxton PA-NORRIS, 20 mL at 19 0652     sodium chloride (PF) 0.9% PF flush 3 mL, 3 mL, Intravenous, Q8H, Mary Braxton PA-NORRIS, 3 mL at 19 0907     sodium chloride (PF) 0.9% PF flush 3 mL, 3 mL, Intravenous, Q1H PRN, Mary Braxton PA-C, 20 mL at 19 0659     sodium chloride (PF) 0.9% PF flush 3 mL, 3 mL, Intracatheter, q1 min prn, Mary Braxton PA-C, 30 mL at 19 0643     sodium chloride (PF) 0.9% PF flush 3 mL, 3 mL, Intracatheter, Q8H, Mary Braxton PA-C, 3 mL at 19 0908     traMADol (ULTRAM) tablet 50 mg, 50 mg, Oral, Q6H PRN, Mary Braxton PA-C, 50 mg at 19 1052     valGANciclovir (VALCYTE) tablet 450 mg, 450 mg, Oral, Daily **OR** valGANciclovir (VALCYTE) solution 450 mg, 450 mg, Oral or NG Tube, Daily, Mandeep Alford MD, 450 mg at 19 09     vitamin D3 (CHOLECALCIFEROL) 1000 units (25 mcg) tablet 1,000 Units, 1,000 Units, Oral, BID, Mary Braxton PA-C, 1,000 Units at 19    Allergies  Allergies   Allergen Reactions     Food      Fruits with cores and pits  Apples     Sulfa Drugs Unknown     Swollen joints     Furosemide Rash       Social History  Social History     Tobacco Use     Smoking status: Former Smoker     Last attempt to quit:      Years since quittin.6     Smokeless tobacco: Never Used     Tobacco comment: weekend smoker     Substance Use Topics     Alcohol use: No       Family History    Family History   Problem Relation Age of Onset     Cirrhosis No family hx of      Liver Cancer No family hx of          Physical Examination   Vitals: BP 97/80 (BP Location: Right arm)   Pulse 97   Temp 98  F (36.7  C)   Resp 20   Wt 92 kg (202 lb 13.2 oz)   SpO2 94%   BMI 37.10 kg/m    General: Adult, in NAD, confused  HEENT: NC/AT, no icterus, op pink and moist  Neuro:  Neuro:  Mental status: somnolent, but easily arousable. Oriented to self and situation only. Follows some commands.. Speech fluency, comprehension and repetition are all impaired. No dysarthria.  Cranial nerves: Eyes conjugate, PERRLA, EOMI, face symmetric, hearing intact to conversation.  Motor: Tone normal. Moving all extremities equally. No drift in UEs. Mild asterixis is present, no lower extremity myoclonus.  Sensory: Intact to pain x 4 extremities  Coordination/gait: unable to perform due to mental status    Investigations   Last Comprehensive Metabolic Panel:  Sodium   Date Value Ref Range Status   09/04/2019 138 133 - 144 mmol/L Final     Potassium   Date Value Ref Range Status   09/04/2019 4.6 3.4 - 5.3 mmol/L Final     Chloride   Date Value Ref Range Status   09/04/2019 103 94 - 109 mmol/L Final     Carbon Dioxide   Date Value Ref Range Status   09/04/2019 30 20 - 32 mmol/L Final     Anion Gap   Date Value Ref Range Status   09/04/2019 5 3 - 14 mmol/L Final     Glucose   Date Value Ref Range Status   09/04/2019 113 (H) 70 - 99 mg/dL Final     Urea Nitrogen   Date Value Ref Range Status   09/04/2019 23 7 - 30 mg/dL Final     Creatinine   Date Value Ref Range Status   09/04/2019 1.07 (H) 0.52 - 1.04 mg/dL Final     GFR Estimate   Date Value Ref Range Status   09/04/2019 57 (L) >60 mL/min/[1.73_m2] Final     Comment:     Non  GFR Calc  Starting 12/18/2018, serum creatinine based estimated GFR (eGFR) will be   calculated using the Chronic Kidney Disease  Epidemiology Collaboration   (CKD-EPI) equation.       Calcium   Date Value Ref Range Status   09/04/2019 8.3 (L) 8.5 - 10.1 mg/dL Final     Bilirubin Total   Date Value Ref Range Status   09/04/2019 1.5 (H) 0.2 - 1.3 mg/dL Final     Alkaline Phosphatase   Date Value Ref Range Status   09/04/2019 122 40 - 150 U/L Final     ALT   Date Value Ref Range Status   09/04/2019 27 0 - 50 U/L Final     AST   Date Value Ref Range Status   09/04/2019 24 0 - 45 U/L Final     Ammonia level pending  CsA level 455 (H)    MRI brain w/ and w/o contrast (9/3):  1. Findings compatible with chronic hepatic encephalopathy.  Questionable findings of superimposed acute hepatic encephalopathy or  uremic encephalopathy, although these signal changes may be related to  the higher field strength at 3T.  2. No acute infarct.

## 2019-09-05 ENCOUNTER — APPOINTMENT (OUTPATIENT)
Dept: INTERVENTIONAL RADIOLOGY/VASCULAR | Facility: CLINIC | Age: 59
DRG: 005 | End: 2019-09-05
Attending: PHYSICIAN ASSISTANT
Payer: COMMERCIAL

## 2019-09-05 LAB
ALBUMIN SERPL-MCNC: 1.8 G/DL (ref 3.4–5)
ALP SERPL-CCNC: 134 U/L (ref 40–150)
ALT SERPL W P-5'-P-CCNC: 30 U/L (ref 0–50)
ANION GAP SERPL CALCULATED.3IONS-SCNC: 5 MMOL/L (ref 3–14)
APPEARANCE CSF: CLEAR
APPEARANCE CSF: CLEAR
AST SERPL W P-5'-P-CCNC: 33 U/L (ref 0–45)
BASOPHILS # BLD AUTO: 0 10E9/L (ref 0–0.2)
BASOPHILS NFR BLD AUTO: 0 %
BILIRUB DIRECT SERPL-MCNC: 1.1 MG/DL (ref 0–0.2)
BILIRUB SERPL-MCNC: 1.6 MG/DL (ref 0.2–1.3)
BUN SERPL-MCNC: 28 MG/DL (ref 7–30)
CALCIUM SERPL-MCNC: 8.3 MG/DL (ref 8.5–10.1)
CHLORIDE SERPL-SCNC: 103 MMOL/L (ref 94–109)
CO2 SERPL-SCNC: 33 MMOL/L (ref 20–32)
COLOR CSF: COLORLESS
COLOR CSF: COLORLESS
CREAT SERPL-MCNC: 1.16 MG/DL (ref 0.52–1.04)
CRYPTOC AG SPEC QL: NORMAL
CYCLOSPORINE BLD LC/MS/MS-MCNC: 115 UG/L (ref 50–400)
DIFFERENTIAL METHOD BLD: ABNORMAL
EOSINOPHIL # BLD AUTO: 0.1 10E9/L (ref 0–0.7)
EOSINOPHIL NFR BLD AUTO: 2.8 %
ERYTHROCYTE [DISTWIDTH] IN BLOOD BY AUTOMATED COUNT: 20.9 % (ref 10–15)
EV RNA SPEC QL NAA+PROBE: NEGATIVE
GFR SERPL CREATININE-BSD FRML MDRD: 51 ML/MIN/{1.73_M2}
GLUCOSE BLDC GLUCOMTR-MCNC: 66 MG/DL (ref 70–99)
GLUCOSE BLDC GLUCOMTR-MCNC: 89 MG/DL (ref 70–99)
GLUCOSE BLDC GLUCOMTR-MCNC: 91 MG/DL (ref 70–99)
GLUCOSE BLDC GLUCOMTR-MCNC: 97 MG/DL (ref 70–99)
GLUCOSE CSF-MCNC: 51 MG/DL (ref 40–70)
GLUCOSE SERPL-MCNC: 81 MG/DL (ref 70–99)
GRAM STN SPEC: NORMAL
HCT VFR BLD AUTO: 24.4 % (ref 35–47)
HGB BLD-MCNC: 7.5 G/DL (ref 11.7–15.7)
IMM GRANULOCYTES # BLD: 0.1 10E9/L (ref 0–0.4)
IMM GRANULOCYTES NFR BLD: 1.9 %
INDIA INK PREP SPEC: NORMAL
LYMPHOCYTES # BLD AUTO: 0.3 10E9/L (ref 0.8–5.3)
LYMPHOCYTES NFR BLD AUTO: 8.1 %
Lab: NORMAL
Lab: NORMAL
MAGNESIUM SERPL-MCNC: 1.3 MG/DL (ref 1.6–2.3)
MCH RBC QN AUTO: 31.1 PG (ref 26.5–33)
MCHC RBC AUTO-ENTMCNC: 30.7 G/DL (ref 31.5–36.5)
MCV RBC AUTO: 101 FL (ref 78–100)
MONOCYTES # BLD AUTO: 0.5 10E9/L (ref 0–1.3)
MONOCYTES NFR BLD AUTO: 12.8 %
NEUTROPHILS # BLD AUTO: 2.7 10E9/L (ref 1.6–8.3)
NEUTROPHILS NFR BLD AUTO: 74.4 %
NRBC # BLD AUTO: 0 10*3/UL
NRBC BLD AUTO-RTO: 0 /100
PHOSPHATE SERPL-MCNC: 4.7 MG/DL (ref 2.5–4.5)
PLATELET # BLD AUTO: 147 10E9/L (ref 150–450)
POTASSIUM SERPL-SCNC: 4.5 MMOL/L (ref 3.4–5.3)
PROT CSF-MCNC: 44 MG/DL (ref 15–60)
PROT SERPL-MCNC: 4.8 G/DL (ref 6.8–8.8)
RBC # BLD AUTO: 2.41 10E12/L (ref 3.8–5.2)
RBC # CSF MANUAL: 12 /UL (ref 0–2)
RBC # CSF MANUAL: NORMAL /UL (ref 0–2)
SODIUM SERPL-SCNC: 141 MMOL/L (ref 133–144)
SPECIMEN SOURCE: NORMAL
TME LAST DOSE: NORMAL H
TUBE # CSF: 1 #
TUBE # CSF: 4 #
WBC # BLD AUTO: 3.6 10E9/L (ref 4–11)
WBC # CSF MANUAL: 1 /UL (ref 0–5)
WBC # CSF MANUAL: NORMAL /UL (ref 0–5)

## 2019-09-05 PROCEDURE — 25000125 ZZHC RX 250: Performed by: RADIOLOGY

## 2019-09-05 PROCEDURE — 80053 COMPREHEN METABOLIC PANEL: CPT | Performed by: TRANSPLANT SURGERY

## 2019-09-05 PROCEDURE — 84100 ASSAY OF PHOSPHORUS: CPT | Performed by: TRANSPLANT SURGERY

## 2019-09-05 PROCEDURE — 00JU3ZZ INSPECTION OF SPINAL CANAL, PERCUTANEOUS APPROACH: ICD-10-PCS | Performed by: PHYSICIAN ASSISTANT

## 2019-09-05 PROCEDURE — 87210 SMEAR WET MOUNT SALINE/INK: CPT | Performed by: STUDENT IN AN ORGANIZED HEALTH CARE EDUCATION/TRAINING PROGRAM

## 2019-09-05 PROCEDURE — 36592 COLLECT BLOOD FROM PICC: CPT | Performed by: TRANSPLANT SURGERY

## 2019-09-05 PROCEDURE — 82945 GLUCOSE OTHER FLUID: CPT | Performed by: PHYSICIAN ASSISTANT

## 2019-09-05 PROCEDURE — 80158 DRUG ASSAY CYCLOSPORINE: CPT | Performed by: PHYSICIAN ASSISTANT

## 2019-09-05 PROCEDURE — 87102 FUNGUS ISOLATION CULTURE: CPT | Performed by: PHYSICIAN ASSISTANT

## 2019-09-05 PROCEDURE — 12000026 ZZH R&B TRANSPLANT

## 2019-09-05 PROCEDURE — 99152 MOD SED SAME PHYS/QHP 5/>YRS: CPT

## 2019-09-05 PROCEDURE — 25000131 ZZH RX MED GY IP 250 OP 636 PS 637: Performed by: TRANSPLANT SURGERY

## 2019-09-05 PROCEDURE — 25000132 ZZH RX MED GY IP 250 OP 250 PS 637: Performed by: PHYSICIAN ASSISTANT

## 2019-09-05 PROCEDURE — 82248 BILIRUBIN DIRECT: CPT | Performed by: TRANSPLANT SURGERY

## 2019-09-05 PROCEDURE — 25800025 ZZH RX 258: Performed by: PHYSICIAN ASSISTANT

## 2019-09-05 PROCEDURE — 87999 UNLISTED MICROBIOLOGY PX: CPT | Performed by: STUDENT IN AN ORGANIZED HEALTH CARE EDUCATION/TRAINING PROGRAM

## 2019-09-05 PROCEDURE — 99153 MOD SED SAME PHYS/QHP EA: CPT

## 2019-09-05 PROCEDURE — 84157 ASSAY OF PROTEIN OTHER: CPT | Performed by: PHYSICIAN ASSISTANT

## 2019-09-05 PROCEDURE — 87529 HSV DNA AMP PROBE: CPT | Performed by: PHYSICIAN ASSISTANT

## 2019-09-05 PROCEDURE — C1769 GUIDE WIRE: HCPCS

## 2019-09-05 PROCEDURE — 87205 SMEAR GRAM STAIN: CPT | Performed by: PHYSICIAN ASSISTANT

## 2019-09-05 PROCEDURE — 87899 AGENT NOS ASSAY W/OPTIC: CPT | Performed by: PHYSICIAN ASSISTANT

## 2019-09-05 PROCEDURE — 89050 BODY FLUID CELL COUNT: CPT | Performed by: PHYSICIAN ASSISTANT

## 2019-09-05 PROCEDURE — 009U3ZX DRAINAGE OF SPINAL CANAL, PERCUTANEOUS APPROACH, DIAGNOSTIC: ICD-10-PCS | Performed by: RADIOLOGY

## 2019-09-05 PROCEDURE — 87181 SC STD AGAR DILUTION PER AGT: CPT | Performed by: PHYSICIAN ASSISTANT

## 2019-09-05 PROCEDURE — 25000131 ZZH RX MED GY IP 250 OP 636 PS 637: Performed by: PHYSICIAN ASSISTANT

## 2019-09-05 PROCEDURE — 87015 SPECIMEN INFECT AGNT CONCNTJ: CPT | Performed by: PHYSICIAN ASSISTANT

## 2019-09-05 PROCEDURE — 87070 CULTURE OTHR SPECIMN AEROBIC: CPT | Performed by: PHYSICIAN ASSISTANT

## 2019-09-05 PROCEDURE — 87075 CULTR BACTERIA EXCEPT BLOOD: CPT | Performed by: PHYSICIAN ASSISTANT

## 2019-09-05 PROCEDURE — 87799 DETECT AGENT NOS DNA QUANT: CPT | Performed by: STUDENT IN AN ORGANIZED HEALTH CARE EDUCATION/TRAINING PROGRAM

## 2019-09-05 PROCEDURE — 62270 DX LMBR SPI PNXR: CPT

## 2019-09-05 PROCEDURE — 25000128 H RX IP 250 OP 636: Performed by: RADIOLOGY

## 2019-09-05 PROCEDURE — 87798 DETECT AGENT NOS DNA AMP: CPT | Performed by: STUDENT IN AN ORGANIZED HEALTH CARE EDUCATION/TRAINING PROGRAM

## 2019-09-05 PROCEDURE — 87076 CULTURE ANAEROBE IDENT EACH: CPT | Performed by: PHYSICIAN ASSISTANT

## 2019-09-05 PROCEDURE — 25000132 ZZH RX MED GY IP 250 OP 250 PS 637: Performed by: SURGERY

## 2019-09-05 PROCEDURE — 83735 ASSAY OF MAGNESIUM: CPT | Performed by: TRANSPLANT SURGERY

## 2019-09-05 PROCEDURE — 00000146 ZZHCL STATISTIC GLUCOSE BY METER IP

## 2019-09-05 PROCEDURE — 43752 NASAL/OROGASTRIC W/TUBE PLMT: CPT

## 2019-09-05 PROCEDURE — 85025 COMPLETE CBC W/AUTO DIFF WBC: CPT | Performed by: TRANSPLANT SURGERY

## 2019-09-05 PROCEDURE — 87498 ENTEROVIRUS PROBE&REVRS TRNS: CPT | Performed by: STUDENT IN AN ORGANIZED HEALTH CARE EDUCATION/TRAINING PROGRAM

## 2019-09-05 RX ORDER — AMOXICILLIN 250 MG
2 CAPSULE ORAL 2 TIMES DAILY PRN
Status: DISCONTINUED | OUTPATIENT
Start: 2019-09-05 | End: 2019-09-23 | Stop reason: HOSPADM

## 2019-09-05 RX ORDER — NICOTINE POLACRILEX 4 MG
15-30 LOZENGE BUCCAL
Status: DISCONTINUED | OUTPATIENT
Start: 2019-09-05 | End: 2019-09-23 | Stop reason: HOSPADM

## 2019-09-05 RX ORDER — QUETIAPINE FUMARATE 25 MG/1
25 TABLET, FILM COATED ORAL AT BEDTIME
Status: DISCONTINUED | OUTPATIENT
Start: 2019-09-05 | End: 2019-09-07

## 2019-09-05 RX ORDER — DEXTROSE MONOHYDRATE 25 G/50ML
25-50 INJECTION, SOLUTION INTRAVENOUS
Status: CANCELLED | OUTPATIENT
Start: 2019-09-05

## 2019-09-05 RX ORDER — NALOXONE HYDROCHLORIDE 0.4 MG/ML
.1-.4 INJECTION, SOLUTION INTRAMUSCULAR; INTRAVENOUS; SUBCUTANEOUS
Status: DISCONTINUED | OUTPATIENT
Start: 2019-09-05 | End: 2019-09-05 | Stop reason: HOSPADM

## 2019-09-05 RX ORDER — FENTANYL CITRATE 50 UG/ML
25-50 INJECTION, SOLUTION INTRAMUSCULAR; INTRAVENOUS EVERY 5 MIN PRN
Status: DISCONTINUED | OUTPATIENT
Start: 2019-09-05 | End: 2019-09-05 | Stop reason: HOSPADM

## 2019-09-05 RX ORDER — FLUMAZENIL 0.1 MG/ML
0.2 INJECTION, SOLUTION INTRAVENOUS
Status: DISCONTINUED | OUTPATIENT
Start: 2019-09-05 | End: 2019-09-05 | Stop reason: HOSPADM

## 2019-09-05 RX ORDER — NICOTINE POLACRILEX 4 MG
15-30 LOZENGE BUCCAL
Status: CANCELLED | OUTPATIENT
Start: 2019-09-05

## 2019-09-05 RX ORDER — LIDOCAINE HYDROCHLORIDE 20 MG/ML
15 SOLUTION OROPHARYNGEAL ONCE
Status: DISCONTINUED | OUTPATIENT
Start: 2019-09-05 | End: 2019-09-05 | Stop reason: CLARIF

## 2019-09-05 RX ORDER — POLYETHYLENE GLYCOL 3350 17 G/17G
17 POWDER, FOR SOLUTION ORAL DAILY PRN
Status: DISCONTINUED | OUTPATIENT
Start: 2019-09-05 | End: 2019-09-23 | Stop reason: HOSPADM

## 2019-09-05 RX ORDER — DEXTROSE MONOHYDRATE 25 G/50ML
25-50 INJECTION, SOLUTION INTRAVENOUS
Status: DISCONTINUED | OUTPATIENT
Start: 2019-09-05 | End: 2019-09-23 | Stop reason: HOSPADM

## 2019-09-05 RX ADMIN — BUMETANIDE 1 MG: 1 TABLET ORAL at 09:35

## 2019-09-05 RX ADMIN — MIDAZOLAM 1 MG: 1 INJECTION INTRAMUSCULAR; INTRAVENOUS at 15:17

## 2019-09-05 RX ADMIN — CYCLOSPORINE 150 MG: 100 SOLUTION ORAL at 20:15

## 2019-09-05 RX ADMIN — DEXTROSE MONOHYDRATE: 100 INJECTION, SOLUTION INTRAVENOUS at 22:01

## 2019-09-05 RX ADMIN — MYCOPHENOLATE MOFETIL 1000 MG: 200 POWDER, FOR SUSPENSION ORAL at 20:15

## 2019-09-05 RX ADMIN — CYCLOSPORINE 150 MG: 100 SOLUTION ORAL at 09:22

## 2019-09-05 RX ADMIN — LEVOTHYROXINE SODIUM 125 MCG: 0.12 TABLET ORAL at 09:35

## 2019-09-05 RX ADMIN — BUMETANIDE 1 MG: 1 TABLET ORAL at 20:15

## 2019-09-05 RX ADMIN — MIDAZOLAM 1 MG: 1 INJECTION INTRAMUSCULAR; INTRAVENOUS at 15:24

## 2019-09-05 RX ADMIN — DEXTROSE 50 % IN WATER (D50W) INTRAVENOUS SYRINGE 25 ML: at 22:01

## 2019-09-05 RX ADMIN — MIDAZOLAM 1 MG: 1 INJECTION INTRAMUSCULAR; INTRAVENOUS at 15:43

## 2019-09-05 RX ADMIN — QUETIAPINE FUMARATE 25 MG: 25 TABLET ORAL at 01:22

## 2019-09-05 RX ADMIN — MYCOPHENOLATE MOFETIL 1000 MG: 250 CAPSULE ORAL at 09:21

## 2019-09-05 RX ADMIN — LIDOCAINE HYDROCHLORIDE 5 ML: 10 INJECTION, SOLUTION EPIDURAL; INFILTRATION; INTRACAUDAL; PERINEURAL at 16:18

## 2019-09-05 ASSESSMENT — ACTIVITIES OF DAILY LIVING (ADL)
ADLS_ACUITY_SCORE: 27
ADLS_ACUITY_SCORE: 27
ADLS_ACUITY_SCORE: 28
ADLS_ACUITY_SCORE: 27
ADLS_ACUITY_SCORE: 27

## 2019-09-05 NOTE — PLAN OF CARE
Tachycardic (), OVSS on RA. HR and WBC triggered sepsis protocol, per PA ok not to draw lactic acid. Pt remains oriented to self and confused/combative at times. Per PM RN, pt got little sleep overnight, has slept on and off today. C/o generalized pain, declined any intervention. Went for NJT placement, attempt was unsuccessful, only received anti-rejection meds this AM, PA notified. Pt will go for LP around 1500, and xray will attempt to place an NJT at that time. Central line and PIV saline locked. OBED w/ large amount of serous output. Clam shell incision sutured and SILVIA. Purewick changed and patent, incontinent of urine x1 when Purewick was not in place. No BM this shift. Up to the chair w/ overhead lift and Ax2. Plan for LP and NJT placement this afternoon. Will continue to monitor and update team w/ changes.

## 2019-09-05 NOTE — PLAN OF CARE
7A PT: cancel, RN reports poor sleep overnight, PT not appropriate at this time. Will reschedule per PT POC.

## 2019-09-05 NOTE — PLAN OF CARE
OT/7A - Cancel. Upon AM attempt, RN reports pt had just fallen asleep after not sleeping much overnight and issues with pt pulling at lines. Will reschedule as appropriate.

## 2019-09-05 NOTE — PROCEDURES
[facility], [place]    Procedure: IR Procedure Note  Date/Time: 9/5/2019 4:20 PM  Performed by: Mc Marquez DO  Authorized by: Mc Marquez DO   IR Fellow Physician:  Radiology Resident Physician: Mc Marquez  Other(s) attending procedure: Ned Dove    UNIVERSAL PROTOCOL   Site Marked: Yes  Prior Images Obtained and Reviewed:  Yes  Required items: Required blood products, implants, devices and special equipment available    Patient identity confirmed:  Arm band  Patient was reevaluated immediately before administering moderate or deep sedation or anesthesia  Confirmation Checklist:  Patient's identity using two indicators, relevant allergies, procedure was appropriate and matched the consent or emergent situation and correct equipment/implants were available  Time out: Immediately prior to the procedure a time out was called    Preparation: Patient was prepped and draped in usual sterile fashion       ANESTHESIA    Local Anesthetic: Lidocaine 1% with epinephrine      SEDATION    Patient Sedated: Yes    Sedation:  Midazolam  Vital signs: Vital signs monitored during sedation    See dictated procedure note for full details.  Findings: 3 mg    Specimens: fluid and/or tissue for laboratory analysis and culture    Complications: None    Condition: Stable    Plan: Return to floor.    PROCEDURE   Patient Tolerance:  Patient tolerated the procedure well with no immediate complications  Describe Procedure: LP was completed first and fluid sent for analysis.  After this, patient was positioned supine and feeding tube placed with nasal bridle.  Feeding tube ready for use.  Time of Sedation in Minutes by Physician:  25

## 2019-09-05 NOTE — PRE-PROCEDURE
GENERAL PRE-PROCEDURE:   Procedure:  LP and NJ tube placement  Date/Time:  9/5/2019 3:12 PM    Written consent obtained?: Yes    Risks and benefits: Risks, benefits and alternatives were discussed    Consent given by:  Patient  Patient states understanding of procedure being performed: Yes    Patient's understanding of procedure matches consent: Yes    Procedure consent matches procedure scheduled: Yes    Expected level of sedation:  Moderate  Appropriately NPO:  Yes  ASA Class:  Class 3- Severe systemic disease, definite functional limitations  Mallampati  :  Grade 2- soft palate, base of uvula, tonsillar pillars, and portion of posterior pharyngeal wall visible  Lungs:  Lungs clear with good breath sounds bilaterally  Heart:  Normal heart sounds and rate  History & Physical reviewed:  History and physical reviewed and no updates needed  Statement of review:  I have reviewed the lab findings, diagnostic data, medications, and the plan for sedation

## 2019-09-05 NOTE — PROGRESS NOTES
Patient Name: Nicci Pemberton  Medical Record Number: [id number]  Today's Date: 9/5/2019    Procedure: Lumbar puncture image guided  Proceduralist: Dr Dove    Sedation start time: 1517  Sedation end time:  1608  Sedation medications administered: Versed 3mg  Total sedation time: 50 min  Sedation Notes: Tolerated well    Procedure start time: 1535  Puncture time: 1535  Procedure end time: 1609    Report given to:  RN 7A  : No    Other Notes: Pt arrived to IR room 2 from . Consent reviewed.. Pt positioned lateral left side up, prepped and monitored per protocol. Sedation given .  Lumbar puncture by Dr Dove.  CSF collected and sent to the lab as ordered.  NJ tube positioned.  Pt tolerated procedure without any noted complications. Pt transferred back to .

## 2019-09-05 NOTE — PROGRESS NOTES
Immunosuppression Note:    Nicci Pemberton is a 59 year old female who is seen today  for immunosuppression management     I, Mandeep Alford MD, I have examined the patient with the resident/PA/Fellow, discussed and agree with the note and findings.  I have reviewed today's vital signs, medications, labs and imaging. I reviewed the immunosuppression medications and levels. 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 Tbili increased to 1.6. R drain removed, L drain output decreasing, will plan to remove once consistently < 500ml/day.    Immunosuppression management: Tac now stopped due to prolonged delirium, CSA started at 300 mg BID. MMF increased to 1000 mg BID. Re-started pred 5mg due to transition between meds. First cyclosporine level came back high at 455. Dose held last night, restarted this AM at 150 mg BID until repeat level is back. Complexity of management: Medium. Contributing factors: anemia and induction     Hematology: Acute blood loss anemia. Transfusion goal hgb > 7. Hgb stable at this time. Last transfused 3 units 8/20 overnight.    HEENT: Post-op nasal bleeding due to friable mucosa, managed with packing, now resolved. Re-advanced feeding tube 8/28, pt pulled overnight. Will order replacement.    Cardiorespiratory: Patient extubated 8/24  Hx of hypertension on spironolactone and bumex at home. BPs stable. Bumex 1 mg BID at this time for edema.    Neuro: Toxic and metabolic encephalopathy likely secondary to poor sleep, drugs (tac, pain medication), acute illness. Ordered melatonin and bedtime seroquel to help patient sleep, patient's  mental status has seemed to be improving overall, but now seems fluctuant again. MRI head completed- results unremarkable. Not sure if current confusion is related to supratherapeutic cyclosporine. Consulted neurology, recommend LP to rule out infectious etiology. Video EEG monitoring completed, no evidence of seizures. Procedure team attempted yesterday, unable to do to patient motion. Consulted IR, will plan for LP today. Continue scheduled melatonin, will reschedule Seroquel at bedtime.    GI/Nutrition: NJ was in good position, tube feeds were at goal, now pulled- will replace feeding tube.  Minimal PO intake.    Endocrine: Steroid induced hyperglycemia, resolved  Fluid/Electrolytes: GAMAL- CRRT stopped and patient has required no further HD. Incontinent of urine, so difficult to quantify, but Cr now normalized. Slight increase today may be related to bumex.   : Incontinent  Infectious disease: Ppx with micafungin x 10 days completed, zosyn completed. PPx with dapsone and valcyte.  Prophylaxis: DVT, fall, GI, fungal  Disposition: 7A     Medical Decision Making: Medium  Subsequent visit 16102 (moderate level decision making)    VEENA/Fellow/Resident Provider: Mary Braxton PA-C [id number]    Faculty: NATHALY Alford MD    ____________________________________________________________  Transplant History: Admitted 8/16/2019 for acute decompensated ANGULO.   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    8/18/2019 (Liver), Postoperative day: 18     Interval History:     Patient seems more confused this AM. Having BMs.      ROS:   A 10-point review of systems was negative except as noted above.    Curent Meds:    aspirin  325 mg Oral Daily     bumetanide  1 mg Oral BID     carboxymethylcellulose PF  2 drop Both Eyes Q6H     clotrimazole  1 Molly Buccal 4x Daily     cycloSPORINE modified  150 mg Oral or Feeding Tube BID IS     dapsone  100 mg Oral Daily     famotidine  20 mg Oral Daily      levothyroxine  125 mcg Oral Daily     melatonin  3 mg Oral At Bedtime     multivitamins w/minerals  15 mL Per Feeding Tube Daily     mycophenolate  1,000 mg Oral BID IS    Or     mycophenolate  1,000 mg Oral or NG Tube BID IS     polyethylene glycol  17 g Oral BID     protein modular  1 packet Per Feeding Tube BID     senna-docusate  2 tablet Oral BID     sodium chloride  1 spray Both Nostrils Q4H     sodium chloride (PF)  3 mL Intravenous Q8H     sodium chloride (PF)  3 mL Intracatheter Q8H     valGANciclovir  450 mg Oral Daily    Or     valGANciclovir  450 mg Oral or NG Tube Daily     vitamin D3  1,000 Units Oral BID       Physical Exam:     Admit Weight: 104.3 kg (230 lb)    Current Vitals:   BP (!) 112/98 (BP Location: Right arm)   Pulse 97   Temp 98.1  F (36.7  C) (Oral)   Resp 18   Wt 92.5 kg (203 lb 14.8 oz)   SpO2 92%   BMI 37.30 kg/m      Vital sign ranges:    Temp:  [97.5  F (36.4  C)-98.8  F (37.1  C)] 98.1  F (36.7  C)  Heart Rate:  [] 110  Resp:  [16-18] 18  BP: ()/(64-98) 112/98  SpO2:  [92 %-96 %] 92 %  Patient Vitals for the past 24 hrs:   BP Temp Temp src Heart Rate Resp SpO2 Weight   09/05/19 0756 (!) 112/98 98.1  F (36.7  C) Oral 110 18 92 % --   09/05/19 0347 102/73 97.5  F (36.4  C) Oral 110 18 92 % --   09/04/19 2346 105/64 98.5  F (36.9  C) Oral 89 16 96 % --   09/04/19 1959 101/65 98.8  F (37.1  C) Oral 98 18 94 % --   09/04/19 1645 (!) 85/65 97.9  F (36.6  C) Oral 88 18 96 % --   09/04/19 1300 106/68 97.6  F (36.4  C) Oral 95 16 95 % 92.5 kg (203 lb 14.8 oz)     General Appearance: Awake, arousable, answering questions, mumbling, somewhat confused  HEENT: Feeding tube removed  Skin: normal, warm, scattered bruising  Heart: NSR  Lungs: NLB on RA   Abdomen: soft, nondistended, incision sutured, c/d/i. OBED x 1 with serous output  : Wearing depends  Extremities: edema: present bilaterally. 3+.  Neurologic: Awake    Frailty Scores     There is no flowsheet data to display.           Data:   CMP  Recent Labs   Lab 09/05/19  0743 09/04/19  0647    138   POTASSIUM 4.5 4.6   CHLORIDE 103 103   CO2 33* 30   GLC 81 113*   BUN 28 23   CR 1.16* 1.07*   GFRESTIMATED 51* 57*   GFRESTBLACK 60* 66   JACOB 8.3* 8.3*   MAG 1.3* 1.5*   PHOS 4.7* 4.5   ALBUMIN 1.8* 1.7*   BILITOTAL 1.6* 1.5*   ALKPHOS 134 122   AST 33 24   ALT 30 27     CBC  Recent Labs   Lab 09/05/19  0743 09/04/19  0647   HGB 7.5* 7.6*   WBC 3.6* 3.2*   * 144*         COAGS  Recent Labs   Lab 09/04/19  1433   INR 1.11

## 2019-09-05 NOTE — CONSULTS
Interventional Radiology Consult Service Note    Patient Name:  Nicci Pemberton   YOB: 1960  Medical Record Number (MRN):  [id number]  Age:  59 year old female  -----    Complete Blood Count:  Lab Results   Component Value Date     09/05/2019       Coagulation:  Lab Results   Component Value Date    INR 1.11 09/04/2019       -----  Difficult LP, CAPS team unable to do, plan for sedation    Reason for Consult Difficult LP, CAPS team unable to do     Ordered By   9/4/2019  5:46 PM Héctor Henson MD - d/w Ranjana Rodriguez   pgr [id number] txp JESSE Braxton - [id number] fellow  Call back # [phone]      ---  ongoing encephalopathy    The L4-5 interspace palpated and marked.  5 ml of 1% lidocaine was instilled on three attempts.  A 3.5 inch 20 gauge needle was inserted three times without success in obtaining CSF. The stylet was replaced and the needle removed.  The patient shifted frequently during procedure, making bedside LP challenging. The patient tolerated the procedure.    ---  Patient will be placed on today's IR add-on schedule for: Diagnostic lumbar puncture     Pre-procedure labs (i.e., plts, INR) are within IR protocol for planned procedure.    IR procedural order and pre-op orders have been entered.   NPO status required for planned sedation.    Pre-procedure medications to be held include: Not applicable    CSF fluid orders to be entered by requesting service and neurology.    Procedural and sedation informed consent will be done prior to procedure.  Planned informed consent to be obtained from spouse.    You may contact the IR control desk/charge RN at [phone] for an estimated time of procedure.     Case discussed with referring JESSE Braxton and IR Dr. Velarde.      RACHEAL Joseph, PARaulC  Physician Assistant - Certified  Interventional Radiology    [phone] (IR control desk)  [phone] (IR on-call pager)

## 2019-09-05 NOTE — PLAN OF CARE
/65 (BP Location: Right arm)   Pulse 97   Temp 98.8  F (37.1  C) (Oral)   Resp 18   Wt 92.5 kg (203 lb 14.8 oz)   SpO2 94%   BMI 37.30 kg/m      1389-5012  Soft BPs /60s, seemingly pt's baseline; OVSS on RA, no s/s of distress. A/O to self/person; unable to answer orientation questions. Speech is garbled, pt also talks to herself often; pt's  at bedside for part of shift and very supportive; helped to calm pt during bedside LP this afternoon. However, MD unable to complete LP d/t pt movement; small bandage in place, c/d/I. Denied pain. Denied n/v--poor appetite on regular diet (pt is total feed, only ate a few bites of her dinner); TF@50ml/hr thru NJ and pt tolerating well. VPM for pt safety/impulsivity and pulling at lines. For the most part, pt calm but gets riled/anxious when needed to void/stool; incontinent of urine x1 (tried to place pt on bedpan but pt could not wait); otherwise, purewick changed and in place. No BMs this shift; barrier cream applied. Pt turning with assist of 2 (assist of 2-3 with standing); turned q2-3 hours. RIJ SLx3. Plan for pt to be NPO after midnight for possible LP tomorrow in IR; (per araceli CARTAGENA, okay to have TF overnight since NJ is postpyloric). Pt resting quietly in bed now, sleeping intermittently while watching television; pt sometimes using call light to ask for assist. Call light and belongings within reach. Will continue to monitor and continue POC/notify team as needed.

## 2019-09-05 NOTE — PLAN OF CARE
Soft BPs 90s-105s/60s, appears stable and at baseline, OVSS on RA. AOx1, confused. Uncooperative at times. Speech is garbled, pt also talks to herself often. Patient failed to have LP 2/2 to constant movement, add on case for IR LP today. NPO Since midnight. Denied pain, nausea or vomiting. Patient pulled her NJ out @ 0400, MD paged, ok to leave out for now as patient can take meds by mouth. OBED - 100 ml, changed x1, refused to be change in middle of the night. VPM for pt safety/impulsivity and pulling at lines. RIJ SL. Purewick in place good urine output. Turned q2-3 hours. No BM overnight. Intermittent sleep and wakes up disoriented and confused, Seroquel prn bedtime dose given.

## 2019-09-06 ENCOUNTER — APPOINTMENT (OUTPATIENT)
Dept: OCCUPATIONAL THERAPY | Facility: CLINIC | Age: 59
DRG: 005 | End: 2019-09-06
Payer: COMMERCIAL

## 2019-09-06 ENCOUNTER — APPOINTMENT (OUTPATIENT)
Dept: INTERVENTIONAL RADIOLOGY/VASCULAR | Facility: CLINIC | Age: 59
DRG: 005 | End: 2019-09-06
Attending: NURSE PRACTITIONER
Payer: COMMERCIAL

## 2019-09-06 LAB
ALBUMIN SERPL-MCNC: 1.8 G/DL (ref 3.4–5)
ALP SERPL-CCNC: 134 U/L (ref 40–150)
ALT SERPL W P-5'-P-CCNC: 26 U/L (ref 0–50)
ANION GAP SERPL CALCULATED.3IONS-SCNC: 8 MMOL/L (ref 3–14)
AST SERPL W P-5'-P-CCNC: 32 U/L (ref 0–45)
BASE EXCESS BLDV CALC-SCNC: 10.3 MMOL/L
BASOPHILS # BLD AUTO: 0 10E9/L (ref 0–0.2)
BASOPHILS NFR BLD AUTO: 0 %
BILIRUB DIRECT SERPL-MCNC: 1.2 MG/DL (ref 0–0.2)
BILIRUB SERPL-MCNC: 1.7 MG/DL (ref 0.2–1.3)
BUN SERPL-MCNC: 21 MG/DL (ref 7–30)
CALCIUM SERPL-MCNC: 8.2 MG/DL (ref 8.5–10.1)
CHLORIDE SERPL-SCNC: 100 MMOL/L (ref 94–109)
CO2 SERPL-SCNC: 32 MMOL/L (ref 20–32)
CREAT SERPL-MCNC: 1.15 MG/DL (ref 0.52–1.04)
CYCLOSPORINE BLD LC/MS/MS-MCNC: 123 UG/L (ref 50–400)
DIFFERENTIAL METHOD BLD: ABNORMAL
EOSINOPHIL # BLD AUTO: 0.1 10E9/L (ref 0–0.7)
EOSINOPHIL NFR BLD AUTO: 4.3 %
ERYTHROCYTE [DISTWIDTH] IN BLOOD BY AUTOMATED COUNT: 20.6 % (ref 10–15)
GFR SERPL CREATININE-BSD FRML MDRD: 52 ML/MIN/{1.73_M2}
GLUCOSE SERPL-MCNC: 93 MG/DL (ref 70–99)
HCO3 BLDV-SCNC: 36 MMOL/L (ref 21–28)
HCT VFR BLD AUTO: 24.6 % (ref 35–47)
HGB BLD-MCNC: 7.3 G/DL (ref 11.7–15.7)
HSV1 DNA CSF QL NAA+PROBE: NOT DETECTED
HSV2 DNA CSF QL NAA+PROBE: NOT DETECTED
IMM GRANULOCYTES # BLD: 0.1 10E9/L (ref 0–0.4)
IMM GRANULOCYTES NFR BLD: 4.3 %
LYMPHOCYTES # BLD AUTO: 0.2 10E9/L (ref 0.8–5.3)
LYMPHOCYTES NFR BLD AUTO: 8.6 %
MAGNESIUM SERPL-MCNC: 1.3 MG/DL (ref 1.6–2.3)
MCH RBC QN AUTO: 29.9 PG (ref 26.5–33)
MCHC RBC AUTO-ENTMCNC: 29.7 G/DL (ref 31.5–36.5)
MCV RBC AUTO: 101 FL (ref 78–100)
METHGB MFR BLD: 1.6 % (ref 0–3)
MICROBIOLOGIST REVIEW: NORMAL
MONOCYTES # BLD AUTO: 0.4 10E9/L (ref 0–1.3)
MONOCYTES NFR BLD AUTO: 17.2 %
NEUTROPHILS # BLD AUTO: 1.5 10E9/L (ref 1.6–8.3)
NEUTROPHILS NFR BLD AUTO: 65.6 %
NRBC # BLD AUTO: 0 10*3/UL
NRBC BLD AUTO-RTO: 0 /100
O2/TOTAL GAS SETTING VFR VENT: ABNORMAL %
PCO2 BLDV: 53 MM HG (ref 40–50)
PH BLDV: 7.44 PH (ref 7.32–7.43)
PHOSPHATE SERPL-MCNC: 4.4 MG/DL (ref 2.5–4.5)
PLATELET # BLD AUTO: 151 10E9/L (ref 150–450)
PO2 BLDV: 40 MM HG (ref 25–47)
POTASSIUM SERPL-SCNC: 4.1 MMOL/L (ref 3.4–5.3)
PROT SERPL-MCNC: 4.8 G/DL (ref 6.8–8.8)
RBC # BLD AUTO: 2.44 10E12/L (ref 3.8–5.2)
SODIUM SERPL-SCNC: 139 MMOL/L (ref 133–144)
SPECIMEN TYPE: NORMAL
TME LAST DOSE: NORMAL H
VARICELLA ZOSTER DNA PCR COMMENT: NORMAL
VZV DNA SPEC QL NAA+PROBE: NORMAL
WBC # BLD AUTO: 2.3 10E9/L (ref 4–11)

## 2019-09-06 PROCEDURE — 25000132 ZZH RX MED GY IP 250 OP 250 PS 637: Performed by: PHYSICIAN ASSISTANT

## 2019-09-06 PROCEDURE — 0DH67UZ INSERTION OF FEEDING DEVICE INTO STOMACH, VIA NATURAL OR ARTIFICIAL OPENING: ICD-10-PCS | Performed by: PHYSICIAN ASSISTANT

## 2019-09-06 PROCEDURE — 25500064 ZZH RX 255 OP 636: Performed by: PHYSICIAN ASSISTANT

## 2019-09-06 PROCEDURE — 83735 ASSAY OF MAGNESIUM: CPT | Performed by: TRANSPLANT SURGERY

## 2019-09-06 PROCEDURE — 36592 COLLECT BLOOD FROM PICC: CPT | Performed by: TRANSPLANT SURGERY

## 2019-09-06 PROCEDURE — 83050 HGB METHEMOGLOBIN QUAN: CPT | Performed by: PHYSICIAN ASSISTANT

## 2019-09-06 PROCEDURE — 80053 COMPREHEN METABOLIC PANEL: CPT | Performed by: TRANSPLANT SURGERY

## 2019-09-06 PROCEDURE — 40000975 ZZHCL STATISTIC JC VIR AB INDEX INHIB: Performed by: TRANSPLANT SURGERY

## 2019-09-06 PROCEDURE — 85025 COMPLETE CBC W/AUTO DIFF WBC: CPT | Performed by: TRANSPLANT SURGERY

## 2019-09-06 PROCEDURE — 25000131 ZZH RX MED GY IP 250 OP 636 PS 637: Performed by: TRANSPLANT SURGERY

## 2019-09-06 PROCEDURE — 27210429 ZZH NUTRITION PRODUCT INTERMEDIATE LITER

## 2019-09-06 PROCEDURE — 84100 ASSAY OF PHOSPHORUS: CPT | Performed by: TRANSPLANT SURGERY

## 2019-09-06 PROCEDURE — 80158 DRUG ASSAY CYCLOSPORINE: CPT | Performed by: PHYSICIAN ASSISTANT

## 2019-09-06 PROCEDURE — 12000026 ZZH R&B TRANSPLANT

## 2019-09-06 PROCEDURE — 25000132 ZZH RX MED GY IP 250 OP 250 PS 637: Performed by: TRANSPLANT SURGERY

## 2019-09-06 PROCEDURE — 36592 COLLECT BLOOD FROM PICC: CPT | Performed by: PHYSICIAN ASSISTANT

## 2019-09-06 PROCEDURE — 25000128 H RX IP 250 OP 636: Performed by: PHYSICIAN ASSISTANT

## 2019-09-06 PROCEDURE — 82248 BILIRUBIN DIRECT: CPT | Performed by: TRANSPLANT SURGERY

## 2019-09-06 PROCEDURE — 25000125 ZZHC RX 250: Performed by: PHYSICIAN ASSISTANT

## 2019-09-06 PROCEDURE — 82803 BLOOD GASES ANY COMBINATION: CPT | Performed by: PHYSICIAN ASSISTANT

## 2019-09-06 PROCEDURE — 44500 INTRO GASTROINTESTINAL TUBE: CPT

## 2019-09-06 PROCEDURE — 25000131 ZZH RX MED GY IP 250 OP 636 PS 637: Performed by: PHYSICIAN ASSISTANT

## 2019-09-06 PROCEDURE — 25000132 ZZH RX MED GY IP 250 OP 250 PS 637: Performed by: SURGERY

## 2019-09-06 PROCEDURE — 97140 MANUAL THERAPY 1/> REGIONS: CPT | Mod: GO

## 2019-09-06 RX ORDER — LIDOCAINE HYDROCHLORIDE 20 MG/ML
JELLY TOPICAL ONCE
Status: COMPLETED | OUTPATIENT
Start: 2019-09-06 | End: 2019-09-06

## 2019-09-06 RX ORDER — IODIXANOL 320 MG/ML
50 INJECTION, SOLUTION INTRAVASCULAR ONCE
Status: COMPLETED | OUTPATIENT
Start: 2019-09-06 | End: 2019-09-06

## 2019-09-06 RX ORDER — MAGNESIUM SULFATE HEPTAHYDRATE 40 MG/ML
4 INJECTION, SOLUTION INTRAVENOUS ONCE
Status: COMPLETED | OUTPATIENT
Start: 2019-09-06 | End: 2019-09-06

## 2019-09-06 RX ADMIN — FAMOTIDINE 20 MG: 20 TABLET ORAL at 16:58

## 2019-09-06 RX ADMIN — ASPIRIN 325 MG ORAL TABLET 325 MG: 325 PILL ORAL at 16:57

## 2019-09-06 RX ADMIN — CYCLOSPORINE 150 MG: 100 SOLUTION ORAL at 17:52

## 2019-09-06 RX ADMIN — VALGANCICLOVIR 450 MG: 450 TABLET, FILM COATED ORAL at 08:03

## 2019-09-06 RX ADMIN — CLOTRIMAZOLE 1 TROCHE: 10 LOZENGE ORAL at 16:44

## 2019-09-06 RX ADMIN — MAGNESIUM SULFATE HEPTAHYDRATE 4 G: 40 INJECTION, SOLUTION INTRAVENOUS at 08:32

## 2019-09-06 RX ADMIN — LEVOTHYROXINE SODIUM 125 MCG: 0.12 TABLET ORAL at 16:58

## 2019-09-06 RX ADMIN — MELATONIN TAB 3 MG 3 MG: 3 TAB at 20:30

## 2019-09-06 RX ADMIN — MYCOPHENOLATE MOFETIL 1000 MG: 200 POWDER, FOR SUSPENSION ORAL at 17:52

## 2019-09-06 RX ADMIN — IODIXANOL 5 ML: 320 INJECTION, SOLUTION INTRAVASCULAR at 14:45

## 2019-09-06 RX ADMIN — LIDOCAINE HYDROCHLORIDE 1 TUBE: 20 JELLY TOPICAL at 14:05

## 2019-09-06 RX ADMIN — MELATONIN 1000 UNITS: at 20:30

## 2019-09-06 RX ADMIN — MYCOPHENOLATE MOFETIL 1000 MG: 200 POWDER, FOR SUSPENSION ORAL at 08:06

## 2019-09-06 RX ADMIN — TRAMADOL HYDROCHLORIDE 50 MG: 50 TABLET, COATED ORAL at 17:52

## 2019-09-06 RX ADMIN — CYCLOSPORINE 150 MG: 100 SOLUTION ORAL at 08:05

## 2019-09-06 RX ADMIN — QUETIAPINE FUMARATE 25 MG: 25 TABLET ORAL at 02:51

## 2019-09-06 RX ADMIN — MULTIVITAMIN 15 ML: LIQUID ORAL at 17:52

## 2019-09-06 RX ADMIN — QUETIAPINE FUMARATE 25 MG: 25 TABLET ORAL at 22:06

## 2019-09-06 RX ADMIN — Medication 1 PACKET: at 20:30

## 2019-09-06 ASSESSMENT — ACTIVITIES OF DAILY LIVING (ADL)
ADLS_ACUITY_SCORE: 30
ADLS_ACUITY_SCORE: 25
ADLS_ACUITY_SCORE: 30
ADLS_ACUITY_SCORE: 27
ADLS_ACUITY_SCORE: 30
ADLS_ACUITY_SCORE: 30

## 2019-09-06 NOTE — PROVIDER NOTIFICATION
Notified oncall MD of pt hypoglycemic episode; BG spotcheck was 66; pt alert and disoriented but refusing apple juice/gel; 1/2 amp of D50 given and D10 started at 50ml/hr (goal TF rate) thru CVC. BG recheck 97; no new orders from provider but to continue to monitor and have night RN check BG q4 overnight.

## 2019-09-06 NOTE — PROGRESS NOTES
Patient Name: Nicci Pemberton  Medical Record Number: [id number]  Today's Date: 9/6/2019    Procedure: Nasojejunal tube replacement Right nostril  Proceduralist: Eric Nation PA-C.    No sedation per providers unless patient gets combative, will attempt without.    Patient in room:  1338  Procedure start time: 1402  Procedure end time: 1423  Patient out of room: 1429    Report given to: SKIP RN     Other Notes: Pt arrived to IR room 2 from 3w-1318. Consent reviewed. Pt denies any questions or concerns regarding procedure. Pt positioned supine and monitored per protocol.  Patient was not combative for procedure. Pt tolerated procedure without any noted complications. Pt transferred back to 2.

## 2019-09-06 NOTE — PLAN OF CARE
Patient VSS on RA, afebrile. Disoriented to place, time, situation. Lethargic today but cooperative. Pt  appears to be resting comforably. No c/o pain, nausea or SOB. O2 on at 2 L/NC. RIJ with D10 @ 50ml/h. L PIV SL. NJ replaced in IR this afternoon for tube feeds. Pt has a sitter in the room to prevent her from pulling her new NJT out. BA on. Purewick in use. Pt incontinent x 1. No stool. Incision sutured. OBED with 40 ml serous output. Assist x2 for turning.   Will continue with POC and notify MD with changes or concerns.

## 2019-09-06 NOTE — PLAN OF CARE
Edema/7A: Pt presenting with soft 1+/2+ pitting distally, 2+ extending to thighs. Skin with no evidence of breakdown, unchanged from previous session. Skin cares performed prior to reapplication of GCB with sween 24 from MTPs to knee creases for continued protection of skin integrity. Okay to wear x 48 hours until therapist returns. Please remove compression if causing numbness, tingling, pain, or becomes soiled.

## 2019-09-06 NOTE — PLAN OF CARE
/67 (BP Location: Right arm)   Pulse 97   Temp 99  F (37.2  C) (Oral)   Resp 20   Wt 84.5 kg (186 lb 4.6 oz)   SpO2 97%   BMI 34.07 kg/m      Patient VSS on RA, afebrile. Alert, disoriented to place, time, situation. Given bedtime seroquel, pt now appears to be resting comforably. No c/o pain, nausea or SOB. Tolerating regular diet. RIJ with D10 @ 50ml/h. L PIV SL. Pt removed NJ @ 0300, team aware. Purewick exchanged @ 0200 mostly incontinent; continent of urine and loose stool x1. Incision sutured. OBED with 75ml serous output. Assist x2 for bed changes. BA/VPM in place. Awaiting results of lumbar puncture.   Will continue with POC and notify MD with changes or concerns.

## 2019-09-06 NOTE — PROCEDURES
[facility], [place]    Procedure: IR Procedure Note  Date/Time: 9/6/2019 2:26 PM  Performed by: Devang Nation PA-C  Authorized by: Devang Nation PA-C     UNIVERSAL PROTOCOL   Site Marked: NA  Prior Images Obtained and Reviewed:  Yes  Required items: Required blood products, implants, devices and special equipment available    Patient identity confirmed:  Verbally with patient  Patient was reevaluated immediately before administering moderate or deep sedation or anesthesia  Confirmation Checklist:  Relevant allergies, procedure was appropriate and matched the consent or emergent situation and correct equipment/implants were available  Time out: Immediately prior to the procedure a time out was called    Preparation: Patient was prepped and draped in usual sterile fashion    ESBL (mL):  0    SEDATION    Patient Sedated: No    See dictated procedure note for full details.  Findings: Fluoro time: 3.5 minutes.    Fluoroscopy guided placement of 8 Fr., 109 cm., Corflo Nasojejunal tube. Tube secured via nasal septum bridal and ready for use.    Specimens: none    Complications: None    Condition: Stable    Plan: Follow up per primary team. NJ tube ready for use.    PROCEDURE   Patient Tolerance:  Patient tolerated the procedure well with no immediate complications    Time of Sedation in Minutes by Physician:  0

## 2019-09-06 NOTE — PLAN OF CARE
OT/7A - Cancel. Pt not appropriate for AM therapy due to limited alertness. Plan for NJ placement on this date as well with sedation. Will check back and/or reschedule.

## 2019-09-06 NOTE — PROVIDER NOTIFICATION
Notified oncall transplant MD that after pt arrived to floor with new NJ, NJ would not flush. Float float paged and unsuccessful with flushing NJ as well. Attempted to give pt PO antirejection meds but pt very sleepy after LP with sedation today and refusing any kind of intake Clog zapper used and allowed to dwell in NJ for an 1hr and a half and then NJ was able to flush but still very sluggish; cellcept and cyclosporine suspensions given at 2015 along with bumex. MD okay with TF being off overnight and will pass to day team to reassess. Pt sleeping now; will continue to monitor.

## 2019-09-06 NOTE — PLAN OF CARE
/81 (BP Location: Right arm)   Pulse 97   Temp 98.5  F (36.9  C) (Axillary)   Resp 14   Wt 84.5 kg (186 lb 4.6 oz)   SpO2 99%   BMI 34.07 kg/m      7253-2604  BPs 85-110s/40s-80s, OVSS on 2L via NC. Pt down in IR at start of shift for LP with light sedation and NJ placement with fluoroscopy. LP successful and CF sent for cx--results pending. Upon return to unit; pt lethargic; easily aroused with verbal stimuli however still confused; rambling and pt was combative with certain cares (pt refusing to turn or have BP done on arm). Denied pain--clamshell incision, sutured. LJP patent with moderate serous output. Denied n/v. NJ clogged upon arrival to unit--Float Float paged and able to get NJ to flush sluggishly with clog zapper and then able to get evening anti-rejection meds down NJ but afterwards, NJ stopped flushing. MD paged and aware; okay with TF off. BG spotcheck was 66--up to 90s now (see previous note). D10 infusing at 50ml/hr overnight thru CVC. PIV SL.  Incontinent of urine x2 (voiding around purewick) and stool x2--BMs loose, brown. Purewick in place now. On bedrest this shift after LP; turning with 2 assist and/or lift. Pt's  and mother requesting update with POC; sticky note left for team. VPM in place for safety/disorientation. Pt slept most of shift without complaint and sleeping now. Call light and belongings within reach. Will continue to monitor and continue POC/notify team as needed.

## 2019-09-06 NOTE — PRE-PROCEDURE
"GENERAL PRE-PROCEDURE:     Risks and benefits: Risks, benefits and alternatives were discussed    Patient states understanding of procedure being performed: Yes    Patient's understanding of procedure matches consent: Yes    Procedure consent matches procedure scheduled: Yes    Expected level of sedation:  Minimal  : Reports eating \"peach yogurt\" 5 hours ago.  ASA Class:  Class 3- Severe systemic disease, definite functional limitations  : Not assessed.  Lungs:  Lungs clear with good breath sounds bilaterally  Heart:  Normal heart sounds and rate  History & Physical reviewed:  History and physical reviewed and no updates needed  Statement of review:  I have reviewed the lab findings, diagnostic data, medications, and the plan for sedation    "

## 2019-09-06 NOTE — PLAN OF CARE
7A PT: Attempted PT session this AM though pt asleep and does not open eyes with multiple verbal and tactile cues. Per RN, pt will likely have NJ placement later today. Will check back as able and appropriate or reschedule per PT POC.

## 2019-09-06 NOTE — PROGRESS NOTES
Social Work Services Progress Note    Hospital Day: 22  Collaborated with:  Keira Flood PA-C and [facility]    Data:  [facility]    Intervention:  Called [facility] to inform of above.    Assessment:  Met with patient to inform and patient indicated understanding and agreement.  Patient indicated her sister was coming to visit her.  This sister will be staying with her in the hotel once she is able to discharge from the TCU    Plan:    Anticipated Disposition:  Facility:  [facility]    Barriers to d/c plan:  Medically stable and bed availability    Follow Up:  SW will remain available to assist as indicated with discharge.

## 2019-09-06 NOTE — PROGRESS NOTES
Immunosuppression Note:    Nicci Pemberton is a 59 year old female who is seen today  for immunosuppression management     I, Mandeep Alford MD, I have examined the patient with the resident/PA/Fellow, discussed and agree with the note and findings.  I have reviewed today's vital signs, medications, labs and imaging. I reviewed the immunosuppression medications and levels. 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 Tbili increased to 1.7. R drain removed, L drain output decreasing, will plan to remove once consistently < 500ml/day. Output < 500 ml x 1 day.     Immunosuppression management: Tac now stopped due to prolonged delirium, CSA started at 300 mg BID. MMF increased to 1000 mg BID. Re-started pred 5mg due to transition between meds. First cyclosporine level came back high at 455. Dose held x 1 dose and restarted 150 mg BID. Today level 123, 10 hr trough. Will repeat level tomorrow. Goal - will discuss with staff. Complexity of management: Medium. Contributing factors: anemia and induction , encephalopathy.     Hematology: Acute blood loss anemia. Transfusion goal hgb > 7. Hgb stable at this time. Last transfused 3 units 8/20.    HEENT: Post-op nasal bleeding due to friable mucosa, managed with packing, now resolved.     Cardiorespiratory: Patient extubated 8/24.   Hx of hypertension on spironolactone and bumex at home. Intermittently hypotensive, SBP . SBP 100s this AM. Hold bumex today.    Neuro: Toxic and metabolic encephalopathy likely secondary to poor sleep, drugs (tac, pain medication), acute illness. Ordered melatonin and bedtime  seroquel to help patient sleep, patient's mental status has seemed to be improving overall, but now seems fluctuant again. MRI head completed- results unremarkable. Not sure if current confusion is related to supratherapeutic cyclosporine. Consulted neurology, recommend LP to rule out infectious etiology. Video EEG monitoring completed, no evidence of seizures. Procedure team attempted yesterday, unable to do to patient motion. Continue scheduled melatonin, will reschedule Seroquel at bedtime. LP yesterday, initial results negative for infection. WBC 1. Protein 44 and glucose 51. Negative CSF studies: cryptococcal, enterovirus, HSV, VZV.      Check VBG given confusion.  Bedside sitter due to pulling at tubes.     GI/Nutrition: No PO intake due to confusion. NJ was in good position, tube feeds were at goal, now pulled again- will replace feeding tube. Now has bedside sitter.     Endocrine: Steroid induced hyperglycemia, resolved  Fluid/Electrolytes: GAMAL- CRRT stopped and patient has required no further HD. Incontinent of urine, so difficult to quantify, but Cr now normalized. Cr 1.2.  Drain output < 500 yesterday. Hypotensive overnight.  Hold bumex.   : Incontinent  Infectious disease: Ppx with micafungin x 10 days completed, zosyn completed. PPx with dapsone and valcyte.  Prophylaxis: DVT, fall, GI, fungal  Disposition: 7A     Medical Decision Making: Medium  Subsequent visit 44067 (moderate level decision making)    VEENA/Fellow/Resident Provider: Keira Flood PA-C [id number]    Faculty: NATHALY Alford MD    ____________________________________________________________  Transplant History: Admitted 8/16/2019 for acute decompensated ANGULO.   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    8/18/2019 (Liver), Postoperative day: 19     Interval History:     Confused. No agitation this AM.       ROS:   A 10-point review of systems was negative except as noted above.    Curent Meds:    aspirin   325 mg Oral Daily     bumetanide  1 mg Oral BID     carboxymethylcellulose PF  2 drop Both Eyes Q6H     clotrimazole  1 Molly Buccal 4x Daily     cycloSPORINE modified  150 mg Oral or Feeding Tube BID IS     dapsone  100 mg Oral Daily     famotidine  20 mg Oral Daily     levothyroxine  125 mcg Oral Daily     melatonin  3 mg Oral At Bedtime     multivitamins w/minerals  15 mL Per Feeding Tube Daily     mycophenolate  1,000 mg Oral BID IS    Or     mycophenolate  1,000 mg Oral or NG Tube BID IS     protein modular  1 packet Per Feeding Tube BID     QUEtiapine  25 mg Oral At Bedtime     sodium chloride  1 spray Both Nostrils Q4H     sodium chloride (PF)  3 mL Intravenous Q8H     sodium chloride (PF)  3 mL Intracatheter Q8H     valGANciclovir  450 mg Oral Daily    Or     valGANciclovir  450 mg Oral or NG Tube Daily     vitamin D3  1,000 Units Oral BID       Physical Exam:     Admit Weight: 104.3 kg (230 lb)    Current Vitals:   /65   Pulse 97   Temp 98.2  F (36.8  C)   Resp 18   Wt 84.5 kg (186 lb 4.6 oz)   SpO2 100%   BMI 34.07 kg/m      Vital sign ranges:    Temp:  [97.8  F (36.6  C)-99.8  F (37.7  C)] 98.2  F (36.8  C)  Heart Rate:  [] 93  Resp:  [14-20] 18  BP: ()/(48-81) 101/65  SpO2:  [91 %-100 %] 100 %  Patient Vitals for the past 24 hrs:   BP Temp Temp src Heart Rate Resp SpO2   09/06/19 1153 101/65 98.2  F (36.8  C) -- 93 18 100 %   09/06/19 0717 106/54 98.5  F (36.9  C) -- 96 20 100 %   09/06/19 0300 105/67 99  F (37.2  C) Oral 95 20 97 %   09/06/19 0039 107/71 97.8  F (36.6  C) Oral 91 18 95 %   09/05/19 1938 113/81 98.5  F (36.9  C) Axillary 92 14 99 %   09/05/19 1645 104/60 -- -- -- -- --   09/05/19 1631 (!) 85/48 99.8  F (37.7  C) Axillary 99 16 96 %   09/05/19 1600 105/77 -- -- 107 -- 93 %   09/05/19 1555 112/68 -- -- 105 -- 91 %   09/05/19 1550 101/71 -- -- 98 -- 94 %   09/05/19 1545 110/71 -- -- 98 -- 98 %   09/05/19 1540 (!) 81/75 -- -- 100 -- 100 %   09/05/19 1535 97/71 -- --  98 -- 100 %   09/05/19 1530 115/76 -- -- 100 -- 100 %   09/05/19 1526 (!) 89/73 -- -- 101 -- 100 %   09/05/19 1521 109/65 -- -- 103 -- 98 %   09/05/19 1518 103/60 -- -- 104 -- 91 %     General Appearance: Awake, arousable, answering questions, mumbling, somewhat confused  HEENT: Feeding tube removed  Skin: normal, warm, scattered bruising  Heart: NSR  Lungs: NLB on RA   Abdomen: soft, nondistended, incision sutured, c/d/i. OBED x 1 with serous output  : Wearing depends  Extremities: edema: present bilaterally. 3+. Wearing lymphedema wraps.  Neurologic: Confused, oriented to name only. No focal deficit.       Data:   CMP  Recent Labs   Lab 09/06/19  0550 09/05/19  0743    141   POTASSIUM 4.1 4.5   CHLORIDE 100 103   CO2 32 33*   GLC 93 81   BUN 21 28   CR 1.15* 1.16*   GFRESTIMATED 52* 51*   GFRESTBLACK 60* 60*   JACOB 8.2* 8.3*   MAG 1.3* 1.3*   PHOS 4.4 4.7*   ALBUMIN 1.8* 1.8*   BILITOTAL 1.7* 1.6*   ALKPHOS 134 134   AST 32 33   ALT 26 30     CBC  Recent Labs   Lab 09/06/19  0550 09/05/19  0743   HGB 7.3* 7.5*   WBC 2.3* 3.6*    147*         COAGS  Recent Labs   Lab 09/04/19  1433   INR 1.11

## 2019-09-06 NOTE — PROGRESS NOTES
Brief Neurology Progress Note:    Patient chart checked today and labs/imaging/eeg examined.    Workup has included MRI brain which revealed patterns consistent with uremic/hepatic disease.  EEG revealed no seizures or interictal activity to suggest underlying seizure disorder.  LP without significant WBC elevation, glucose wnl, protein wnl RBC wnl.  Multiple fungal and viral tests remain, supposing any of these returns positive would suggest involving ID transplant otherwise primary diagnosis likely related to patient's profound metabolic derangements.  Depending on the length of time such derangements exist, clearance and normalization from a cognitive standpoint can take from days to months but is common to lag behind blood markers of common markers (ammonia, BUN included).        Neurology will follow peripherally.    Huseyin Chan MD/PhD  [facility]  [phone]    Agree with plan  I personally did not see this patient today  Raymond cook md  September 6, 2019

## 2019-09-06 NOTE — CONSULTS
Patient is on IR schedule 9/6/2019 for a NJ tube placement with sedation.   Pt is NPO.  Consent will be done prior to procedure.     Please contact the IR charge RN at [phone] for estimated time of procedure.     Case discussed with Dr. Martinez from IR and Keira Flood PA-C. 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 IR placed an NJ tube yesterday with sedation after attempts in general radiology to place the feeding tube without sedation. The tube was bridled, but the patient was able to pull it out. Team is asking for replacement and they will have a sitter.    Melissa Ramesh DNP, APRN  Interventional Radiology  Pager: [phone]

## 2019-09-07 ENCOUNTER — APPOINTMENT (OUTPATIENT)
Dept: GENERAL RADIOLOGY | Facility: CLINIC | Age: 59
DRG: 005 | End: 2019-09-07
Attending: PHYSICIAN ASSISTANT
Payer: COMMERCIAL

## 2019-09-07 LAB
ALBUMIN SERPL-MCNC: 1.7 G/DL (ref 3.4–5)
ALP SERPL-CCNC: 129 U/L (ref 40–150)
ALT SERPL W P-5'-P-CCNC: 23 U/L (ref 0–50)
ANION GAP SERPL CALCULATED.3IONS-SCNC: 5 MMOL/L (ref 3–14)
ANISOCYTOSIS BLD QL SMEAR: ABNORMAL
AST SERPL W P-5'-P-CCNC: 27 U/L (ref 0–45)
BASE EXCESS BLDV CALC-SCNC: 11 MMOL/L
BASOPHILS # BLD AUTO: 0 10E9/L (ref 0–0.2)
BASOPHILS NFR BLD AUTO: 1.8 %
BILIRUB DIRECT SERPL-MCNC: 1 MG/DL (ref 0–0.2)
BILIRUB SERPL-MCNC: 1.4 MG/DL (ref 0.2–1.3)
BUN SERPL-MCNC: 20 MG/DL (ref 7–30)
CALCIUM SERPL-MCNC: 8.2 MG/DL (ref 8.5–10.1)
CHLORIDE SERPL-SCNC: 99 MMOL/L (ref 94–109)
CO2 SERPL-SCNC: 33 MMOL/L (ref 20–32)
CREAT SERPL-MCNC: 1.07 MG/DL (ref 0.52–1.04)
CYCLOSPORINE BLD LC/MS/MS-MCNC: 166 UG/L (ref 50–400)
DIFFERENTIAL METHOD BLD: ABNORMAL
EOSINOPHIL # BLD AUTO: 0.2 10E9/L (ref 0–0.7)
EOSINOPHIL NFR BLD AUTO: 7.9 %
ERYTHROCYTE [DISTWIDTH] IN BLOOD BY AUTOMATED COUNT: 20.5 % (ref 10–15)
GFR SERPL CREATININE-BSD FRML MDRD: 57 ML/MIN/{1.73_M2}
GLUCOSE SERPL-MCNC: 120 MG/DL (ref 70–99)
HCO3 BLDV-SCNC: 37 MMOL/L (ref 21–28)
HCT VFR BLD AUTO: 23.1 % (ref 35–47)
HGB BLD-MCNC: 7 G/DL (ref 11.7–15.7)
LYMPHOCYTES # BLD AUTO: 0.1 10E9/L (ref 0.8–5.3)
LYMPHOCYTES NFR BLD AUTO: 3.5 %
MACROCYTES BLD QL SMEAR: PRESENT
MAGNESIUM SERPL-MCNC: 2.1 MG/DL (ref 1.6–2.3)
MCH RBC QN AUTO: 30.8 PG (ref 26.5–33)
MCHC RBC AUTO-ENTMCNC: 30.3 G/DL (ref 31.5–36.5)
MCV RBC AUTO: 102 FL (ref 78–100)
MONOCYTES # BLD AUTO: 0.2 10E9/L (ref 0–1.3)
MONOCYTES NFR BLD AUTO: 8.8 %
NEUTROPHILS # BLD AUTO: 1.9 10E9/L (ref 1.6–8.3)
NEUTROPHILS NFR BLD AUTO: 77.1 %
O2/TOTAL GAS SETTING VFR VENT: ABNORMAL %
PCO2 BLDV: 60 MM HG (ref 40–50)
PH BLDV: 7.4 PH (ref 7.32–7.43)
PHOSPHATE SERPL-MCNC: 4.1 MG/DL (ref 2.5–4.5)
PLATELET # BLD AUTO: 170 10E9/L (ref 150–450)
PLATELET # BLD EST: ABNORMAL 10*3/UL
PO2 BLDV: 35 MM HG (ref 25–47)
POTASSIUM SERPL-SCNC: 4.2 MMOL/L (ref 3.4–5.3)
PROMYELOCYTES # BLD MANUAL: 0 10E9/L
PROMYELOCYTES NFR BLD MANUAL: 0.9 %
PROT SERPL-MCNC: 4.6 G/DL (ref 6.8–8.8)
RBC # BLD AUTO: 2.27 10E12/L (ref 3.8–5.2)
SODIUM SERPL-SCNC: 137 MMOL/L (ref 133–144)
TME LAST DOSE: NORMAL H
WBC # BLD AUTO: 2.4 10E9/L (ref 4–11)

## 2019-09-07 PROCEDURE — 25000132 ZZH RX MED GY IP 250 OP 250 PS 637: Performed by: PHYSICIAN ASSISTANT

## 2019-09-07 PROCEDURE — 80158 DRUG ASSAY CYCLOSPORINE: CPT | Performed by: PHYSICIAN ASSISTANT

## 2019-09-07 PROCEDURE — 36592 COLLECT BLOOD FROM PICC: CPT | Performed by: PHYSICIAN ASSISTANT

## 2019-09-07 PROCEDURE — 82248 BILIRUBIN DIRECT: CPT | Performed by: TRANSPLANT SURGERY

## 2019-09-07 PROCEDURE — 25000132 ZZH RX MED GY IP 250 OP 250 PS 637: Performed by: TRANSPLANT SURGERY

## 2019-09-07 PROCEDURE — 83735 ASSAY OF MAGNESIUM: CPT | Performed by: TRANSPLANT SURGERY

## 2019-09-07 PROCEDURE — 82803 BLOOD GASES ANY COMBINATION: CPT | Performed by: STUDENT IN AN ORGANIZED HEALTH CARE EDUCATION/TRAINING PROGRAM

## 2019-09-07 PROCEDURE — 80053 COMPREHEN METABOLIC PANEL: CPT | Performed by: TRANSPLANT SURGERY

## 2019-09-07 PROCEDURE — 36592 COLLECT BLOOD FROM PICC: CPT | Performed by: TRANSPLANT SURGERY

## 2019-09-07 PROCEDURE — 36592 COLLECT BLOOD FROM PICC: CPT | Performed by: STUDENT IN AN ORGANIZED HEALTH CARE EDUCATION/TRAINING PROGRAM

## 2019-09-07 PROCEDURE — 12000026 ZZH R&B TRANSPLANT

## 2019-09-07 PROCEDURE — 82803 BLOOD GASES ANY COMBINATION: CPT | Performed by: PHYSICIAN ASSISTANT

## 2019-09-07 PROCEDURE — 25000131 ZZH RX MED GY IP 250 OP 636 PS 637: Performed by: TRANSPLANT SURGERY

## 2019-09-07 PROCEDURE — 85025 COMPLETE CBC W/AUTO DIFF WBC: CPT | Performed by: TRANSPLANT SURGERY

## 2019-09-07 PROCEDURE — 27210429 ZZH NUTRITION PRODUCT INTERMEDIATE LITER

## 2019-09-07 PROCEDURE — P9041 ALBUMIN (HUMAN),5%, 50ML: HCPCS | Performed by: PHYSICIAN ASSISTANT

## 2019-09-07 PROCEDURE — 25000125 ZZHC RX 250: Performed by: PHYSICIAN ASSISTANT

## 2019-09-07 PROCEDURE — 25000132 ZZH RX MED GY IP 250 OP 250 PS 637: Performed by: SURGERY

## 2019-09-07 PROCEDURE — 84100 ASSAY OF PHOSPHORUS: CPT | Performed by: TRANSPLANT SURGERY

## 2019-09-07 PROCEDURE — 25000128 H RX IP 250 OP 636: Performed by: PHYSICIAN ASSISTANT

## 2019-09-07 PROCEDURE — 25000131 ZZH RX MED GY IP 250 OP 636 PS 637: Performed by: PHYSICIAN ASSISTANT

## 2019-09-07 PROCEDURE — 71046 X-RAY EXAM CHEST 2 VIEWS: CPT

## 2019-09-07 RX ORDER — ALBUMIN, HUMAN INJ 5% 5 %
25 SOLUTION INTRAVENOUS ONCE
Status: COMPLETED | OUTPATIENT
Start: 2019-09-07 | End: 2019-09-07

## 2019-09-07 RX ORDER — HEPARIN SODIUM 5000 [USP'U]/.5ML
5000 INJECTION, SOLUTION INTRAVENOUS; SUBCUTANEOUS EVERY 8 HOURS
Status: DISCONTINUED | OUTPATIENT
Start: 2019-09-07 | End: 2019-09-23 | Stop reason: HOSPADM

## 2019-09-07 RX ADMIN — LEVOTHYROXINE SODIUM 125 MCG: 0.12 TABLET ORAL at 08:25

## 2019-09-07 RX ADMIN — CYCLOSPORINE 150 MG: 100 SOLUTION ORAL at 18:48

## 2019-09-07 RX ADMIN — Medication 2 DROP: at 08:25

## 2019-09-07 RX ADMIN — Medication 2 DROP: at 14:11

## 2019-09-07 RX ADMIN — Medication 5000 UNITS: at 12:01

## 2019-09-07 RX ADMIN — VALGANCICLOVIR HYDROCHLORIDE 450 MG: 50 POWDER, FOR SOLUTION ORAL at 08:25

## 2019-09-07 RX ADMIN — MELATONIN TAB 3 MG 3 MG: 3 TAB at 19:11

## 2019-09-07 RX ADMIN — CLOTRIMAZOLE 1 TROCHE: 10 LOZENGE ORAL at 15:54

## 2019-09-07 RX ADMIN — MULTIVITAMIN 15 ML: LIQUID ORAL at 08:25

## 2019-09-07 RX ADMIN — MELATONIN 1000 UNITS: at 19:11

## 2019-09-07 RX ADMIN — ALBUMIN HUMAN 25 G: 0.05 INJECTION, SOLUTION INTRAVENOUS at 06:13

## 2019-09-07 RX ADMIN — CYCLOSPORINE 150 MG: 100 SOLUTION ORAL at 08:25

## 2019-09-07 RX ADMIN — MYCOPHENOLATE MOFETIL 1000 MG: 200 POWDER, FOR SUSPENSION ORAL at 18:51

## 2019-09-07 RX ADMIN — CLOTRIMAZOLE 1 TROCHE: 10 LOZENGE ORAL at 12:01

## 2019-09-07 RX ADMIN — CLOTRIMAZOLE 1 TROCHE: 10 LOZENGE ORAL at 08:25

## 2019-09-07 RX ADMIN — Medication 2 DROP: at 19:20

## 2019-09-07 RX ADMIN — FAMOTIDINE 20 MG: 20 TABLET ORAL at 08:25

## 2019-09-07 RX ADMIN — Medication 5000 UNITS: at 18:48

## 2019-09-07 RX ADMIN — TRAMADOL HYDROCHLORIDE 50 MG: 50 TABLET, COATED ORAL at 16:08

## 2019-09-07 RX ADMIN — Medication 1 PACKET: at 08:25

## 2019-09-07 RX ADMIN — CLOTRIMAZOLE 1 TROCHE: 10 LOZENGE ORAL at 19:11

## 2019-09-07 RX ADMIN — MYCOPHENOLATE MOFETIL 1000 MG: 200 POWDER, FOR SUSPENSION ORAL at 08:25

## 2019-09-07 RX ADMIN — MELATONIN 1000 UNITS: at 08:25

## 2019-09-07 RX ADMIN — Medication 1 PACKET: at 19:11

## 2019-09-07 RX ADMIN — ASPIRIN 325 MG ORAL TABLET 325 MG: 325 PILL ORAL at 08:25

## 2019-09-07 RX ADMIN — DAPSONE 100 MG: 100 TABLET ORAL at 08:25

## 2019-09-07 RX ADMIN — SALINE NASAL SPRAY 1 SPRAY: 1.5 SOLUTION NASAL at 19:20

## 2019-09-07 RX ADMIN — TRAMADOL HYDROCHLORIDE 50 MG: 50 TABLET, COATED ORAL at 05:08

## 2019-09-07 ASSESSMENT — ACTIVITIES OF DAILY LIVING (ADL)
ADLS_ACUITY_SCORE: 17
ADLS_ACUITY_SCORE: 25
ADLS_ACUITY_SCORE: 16
ADLS_ACUITY_SCORE: 16
ADLS_ACUITY_SCORE: 34
ADLS_ACUITY_SCORE: 17

## 2019-09-07 NOTE — PLAN OF CARE
VSS on 2L NC, attempted to wean oxygen, but sats 88% on RA. VBG drawn, PA aware of results. Tramadol x1 for complaints of mid abdominal pain. TF restarted at 1700, currently infusing at 20cc/hr, next increase at 1am. Void on bedpan x1, incontinent at times, pure wick in place. BM x1. OBED with moderate serous output. Turned q 2 hours. I.J. Dressing changed. Pt able to sit up in bed for dinner, ate bowl of soup and 1/2 glass of milk. Oriented to self only, but knows she is having difficulty remembering and searching for words. Was able to talk about her past (family, where she grew up, etc) this evening. Sitter in place to prevent pulling at lines. Currently resting comfortably in bed.

## 2019-09-07 NOTE — PLAN OF CARE
OT 7A: Attempted to see pt in AM. Pt sleeping with sitter at bedside and not appropriate to be seen at this time per chart review (delirium). Will attempt to see this PM, but if unable, will schedule as high priority for tomorrow.

## 2019-09-07 NOTE — PLAN OF CARE
AVSS and denies pain  Patient continues to be slightly confused, and sitter at bedside for patient safety.  Patient up to chair for meals,incontinent of urine and stool, poor appetite today, NJ with tube feeds now at 50ml/hr, and OBED to bulb suction with large output.  Chest xray completed.  Med card and lab book updated  Scheduled meds given as ordered.  Continue to monitor, treat as ordered, notify team with changes.

## 2019-09-07 NOTE — PLAN OF CARE
VS: VSS, afebrile, on 1-2L oxygen via NC  Mental Status: A&O 2-3; forgetful to time, date; sitter at bedside for history of pulling out feeding tube/s; calling appropriately for bedpan, pain medication   Cardiac: WNL, denies chest pain   Respiratory: LS diminished in the bases, denies SOB, on oxygen/no previous home oxygen   GI/: voiding icteric urine via bedpan; see I&O's; abdomen rounded, + bowel tones, + flatus, no BM this shift; clam-shell incision SILVIA with sutures; L OBED leaking via insertion site; dressing changed x1; NJ tube patent to tube feed/40 mL/hr (goal 50 mL/hr), tolerating without nausea   Pain: abdominal pain; PRN tramadol given x1  Diet: regular diet, continuous tube feed/advanced to 40 ml/hr this shift; plan to advance to 50 mL/hr (goal) at 0900.  Mobility: NOOB; PT/OT following   Treatment: Tube feed advancement to goal;  increase activity tolerance; monitor for mentation clear/ing   DC plan: TBD; probable TCU prior to home w/

## 2019-09-08 ENCOUNTER — APPOINTMENT (OUTPATIENT)
Dept: OCCUPATIONAL THERAPY | Facility: CLINIC | Age: 59
DRG: 005 | End: 2019-09-08
Payer: COMMERCIAL

## 2019-09-08 LAB
ABO + RH BLD: NORMAL
ABO + RH BLD: NORMAL
ALBUMIN SERPL-MCNC: 1.9 G/DL (ref 3.4–5)
ALP SERPL-CCNC: 129 U/L (ref 40–150)
ALT SERPL W P-5'-P-CCNC: 24 U/L (ref 0–50)
ANION GAP SERPL CALCULATED.3IONS-SCNC: 6 MMOL/L (ref 3–14)
AST SERPL W P-5'-P-CCNC: 25 U/L (ref 0–45)
BASE EXCESS BLDV CALC-SCNC: 10.1 MMOL/L
BASE EXCESS BLDV CALC-SCNC: 9.9 MMOL/L
BASOPHILS # BLD AUTO: 0 10E9/L (ref 0–0.2)
BASOPHILS NFR BLD AUTO: 0.3 %
BILIRUB DIRECT SERPL-MCNC: 0.9 MG/DL (ref 0–0.2)
BILIRUB SERPL-MCNC: 1.4 MG/DL (ref 0.2–1.3)
BLD GP AB SCN SERPL QL: NORMAL
BLD PROD TYP BPU: NORMAL
BLD PROD TYP BPU: NORMAL
BLD UNIT ID BPU: 0
BLOOD BANK CMNT PATIENT-IMP: NORMAL
BLOOD PRODUCT CODE: NORMAL
BPU ID: NORMAL
BUN SERPL-MCNC: 21 MG/DL (ref 7–30)
CALCIUM SERPL-MCNC: 8.3 MG/DL (ref 8.5–10.1)
CHLORIDE SERPL-SCNC: 99 MMOL/L (ref 94–109)
CO2 SERPL-SCNC: 33 MMOL/L (ref 20–32)
CREAT SERPL-MCNC: 0.81 MG/DL (ref 0.52–1.04)
CYCLOSPORINE BLD LC/MS/MS-MCNC: 109 UG/L (ref 50–400)
DIFFERENTIAL METHOD BLD: ABNORMAL
EOSINOPHIL # BLD AUTO: 0.2 10E9/L (ref 0–0.7)
EOSINOPHIL NFR BLD AUTO: 6.7 %
ERYTHROCYTE [DISTWIDTH] IN BLOOD BY AUTOMATED COUNT: 19.9 % (ref 10–15)
GFR SERPL CREATININE-BSD FRML MDRD: 79 ML/MIN/{1.73_M2}
GLUCOSE SERPL-MCNC: 148 MG/DL (ref 70–99)
HCO3 BLDV-SCNC: 36 MMOL/L (ref 21–28)
HCO3 BLDV-SCNC: 36 MMOL/L (ref 21–28)
HCT VFR BLD AUTO: 23.9 % (ref 35–47)
HGB BLD-MCNC: 7.1 G/DL (ref 11.7–15.7)
IMM GRANULOCYTES # BLD: 0.2 10E9/L (ref 0–0.4)
IMM GRANULOCYTES NFR BLD: 4.8 %
JCPYV DNA SERPL QL NAA+PROBE: NOT DETECTED
LACTATE BLD-SCNC: 0.7 MMOL/L (ref 0.7–2)
LYMPHOCYTES # BLD AUTO: 0.3 10E9/L (ref 0.8–5.3)
LYMPHOCYTES NFR BLD AUTO: 8 %
MAGNESIUM SERPL-MCNC: 2 MG/DL (ref 1.6–2.3)
MCH RBC QN AUTO: 30.1 PG (ref 26.5–33)
MCHC RBC AUTO-ENTMCNC: 29.7 G/DL (ref 31.5–36.5)
MCV RBC AUTO: 101 FL (ref 78–100)
MONOCYTES # BLD AUTO: 0.5 10E9/L (ref 0–1.3)
MONOCYTES NFR BLD AUTO: 15.1 %
NEUTROPHILS # BLD AUTO: 2 10E9/L (ref 1.6–8.3)
NEUTROPHILS NFR BLD AUTO: 65.1 %
NRBC # BLD AUTO: 0 10*3/UL
NRBC BLD AUTO-RTO: 0 /100
NUM BPU REQUESTED: 1
O2/TOTAL GAS SETTING VFR VENT: ABNORMAL %
O2/TOTAL GAS SETTING VFR VENT: ABNORMAL %
PCO2 BLDV: 57 MM HG (ref 40–50)
PCO2 BLDV: 63 MM HG (ref 40–50)
PH BLDV: 7.37 PH (ref 7.32–7.43)
PH BLDV: 7.41 PH (ref 7.32–7.43)
PHOSPHATE SERPL-MCNC: 3 MG/DL (ref 2.5–4.5)
PLATELET # BLD AUTO: 174 10E9/L (ref 150–450)
PO2 BLDV: 38 MM HG (ref 25–47)
PO2 BLDV: 38 MM HG (ref 25–47)
POTASSIUM SERPL-SCNC: 4.5 MMOL/L (ref 3.4–5.3)
PROT SERPL-MCNC: 5 G/DL (ref 6.8–8.8)
RBC # BLD AUTO: 2.36 10E12/L (ref 3.8–5.2)
SODIUM SERPL-SCNC: 137 MMOL/L (ref 133–144)
SPECIMEN EXP DATE BLD: NORMAL
SPECIMEN SOURCE: NORMAL
TME LAST DOSE: NORMAL H
TRANSFUSION STATUS PATIENT QL: NORMAL
TRANSFUSION STATUS PATIENT QL: NORMAL
WBC # BLD AUTO: 3.1 10E9/L (ref 4–11)

## 2019-09-08 PROCEDURE — 25000128 H RX IP 250 OP 636: Performed by: PHYSICIAN ASSISTANT

## 2019-09-08 PROCEDURE — 82248 BILIRUBIN DIRECT: CPT | Performed by: TRANSPLANT SURGERY

## 2019-09-08 PROCEDURE — 25000131 ZZH RX MED GY IP 250 OP 636 PS 637: Performed by: TRANSPLANT SURGERY

## 2019-09-08 PROCEDURE — 25000125 ZZHC RX 250: Performed by: PHYSICIAN ASSISTANT

## 2019-09-08 PROCEDURE — 97530 THERAPEUTIC ACTIVITIES: CPT | Mod: GO

## 2019-09-08 PROCEDURE — 83605 ASSAY OF LACTIC ACID: CPT

## 2019-09-08 PROCEDURE — 80053 COMPREHEN METABOLIC PANEL: CPT | Performed by: TRANSPLANT SURGERY

## 2019-09-08 PROCEDURE — 25000132 ZZH RX MED GY IP 250 OP 250 PS 637: Performed by: PHYSICIAN ASSISTANT

## 2019-09-08 PROCEDURE — 84100 ASSAY OF PHOSPHORUS: CPT | Performed by: TRANSPLANT SURGERY

## 2019-09-08 PROCEDURE — 27210429 ZZH NUTRITION PRODUCT INTERMEDIATE LITER

## 2019-09-08 PROCEDURE — 86901 BLOOD TYPING SEROLOGIC RH(D): CPT | Performed by: INTERNAL MEDICINE

## 2019-09-08 PROCEDURE — 36592 COLLECT BLOOD FROM PICC: CPT | Performed by: TRANSPLANT SURGERY

## 2019-09-08 PROCEDURE — 25000125 ZZHC RX 250

## 2019-09-08 PROCEDURE — P9047 ALBUMIN (HUMAN), 25%, 50ML: HCPCS | Performed by: PHYSICIAN ASSISTANT

## 2019-09-08 PROCEDURE — 86900 BLOOD TYPING SEROLOGIC ABO: CPT | Performed by: INTERNAL MEDICINE

## 2019-09-08 PROCEDURE — 83735 ASSAY OF MAGNESIUM: CPT | Performed by: TRANSPLANT SURGERY

## 2019-09-08 PROCEDURE — 86850 RBC ANTIBODY SCREEN: CPT | Performed by: INTERNAL MEDICINE

## 2019-09-08 PROCEDURE — P9016 RBC LEUKOCYTES REDUCED: HCPCS | Performed by: INTERNAL MEDICINE

## 2019-09-08 PROCEDURE — 40000894 ZZH STATISTIC OT IP EVAL DEFER

## 2019-09-08 PROCEDURE — 82803 BLOOD GASES ANY COMBINATION: CPT | Performed by: STUDENT IN AN ORGANIZED HEALTH CARE EDUCATION/TRAINING PROGRAM

## 2019-09-08 PROCEDURE — 25000132 ZZH RX MED GY IP 250 OP 250 PS 637: Performed by: SURGERY

## 2019-09-08 PROCEDURE — 25000132 ZZH RX MED GY IP 250 OP 250 PS 637: Performed by: STUDENT IN AN ORGANIZED HEALTH CARE EDUCATION/TRAINING PROGRAM

## 2019-09-08 PROCEDURE — 97140 MANUAL THERAPY 1/> REGIONS: CPT | Mod: GO | Performed by: OCCUPATIONAL THERAPIST

## 2019-09-08 PROCEDURE — 12000026 ZZH R&B TRANSPLANT

## 2019-09-08 PROCEDURE — 86923 COMPATIBILITY TEST ELECTRIC: CPT | Performed by: INTERNAL MEDICINE

## 2019-09-08 PROCEDURE — 85025 COMPLETE CBC W/AUTO DIFF WBC: CPT | Performed by: TRANSPLANT SURGERY

## 2019-09-08 PROCEDURE — 94660 CPAP INITIATION&MGMT: CPT

## 2019-09-08 PROCEDURE — 25000131 ZZH RX MED GY IP 250 OP 636 PS 637: Performed by: PHYSICIAN ASSISTANT

## 2019-09-08 PROCEDURE — 80158 DRUG ASSAY CYCLOSPORINE: CPT | Performed by: PHYSICIAN ASSISTANT

## 2019-09-08 PROCEDURE — 25000132 ZZH RX MED GY IP 250 OP 250 PS 637: Performed by: TRANSPLANT SURGERY

## 2019-09-08 RX ORDER — LIDOCAINE HYDROCHLORIDE 10 MG/ML
10 INJECTION, SOLUTION EPIDURAL; INFILTRATION; INTRACAUDAL; PERINEURAL ONCE
Status: COMPLETED | OUTPATIENT
Start: 2019-09-08 | End: 2019-09-08

## 2019-09-08 RX ORDER — CYCLOSPORINE 100 MG/ML
175 SOLUTION ORAL
Status: DISCONTINUED | OUTPATIENT
Start: 2019-09-08 | End: 2019-09-10

## 2019-09-08 RX ORDER — CYCLOSPORINE 100 MG/ML
175 SOLUTION ORAL
Status: DISCONTINUED | OUTPATIENT
Start: 2019-09-08 | End: 2019-09-08 | Stop reason: CLARIF

## 2019-09-08 RX ORDER — LIDOCAINE HYDROCHLORIDE 10 MG/ML
INJECTION, SOLUTION EPIDURAL; INFILTRATION; INTRACAUDAL; PERINEURAL
Status: COMPLETED
Start: 2019-09-08 | End: 2019-09-08

## 2019-09-08 RX ORDER — BUMETANIDE 0.25 MG/ML
2 INJECTION INTRAMUSCULAR; INTRAVENOUS ONCE
Status: COMPLETED | OUTPATIENT
Start: 2019-09-08 | End: 2019-09-08

## 2019-09-08 RX ORDER — ALBUMIN (HUMAN) 12.5 G/50ML
12.5 SOLUTION INTRAVENOUS EVERY 6 HOURS
Status: COMPLETED | OUTPATIENT
Start: 2019-09-08 | End: 2019-09-09

## 2019-09-08 RX ADMIN — MULTIVITAMIN 15 ML: LIQUID ORAL at 10:18

## 2019-09-08 RX ADMIN — Medication 5000 UNITS: at 19:37

## 2019-09-08 RX ADMIN — TRAMADOL HYDROCHLORIDE 50 MG: 50 TABLET, COATED ORAL at 17:48

## 2019-09-08 RX ADMIN — MELATONIN 1000 UNITS: at 10:19

## 2019-09-08 RX ADMIN — Medication 1 PACKET: at 10:36

## 2019-09-08 RX ADMIN — FAMOTIDINE 20 MG: 20 TABLET ORAL at 10:19

## 2019-09-08 RX ADMIN — MYCOPHENOLATE MOFETIL 1000 MG: 200 POWDER, FOR SUSPENSION ORAL at 10:16

## 2019-09-08 RX ADMIN — Medication 5000 UNITS: at 03:33

## 2019-09-08 RX ADMIN — TRAMADOL HYDROCHLORIDE 50 MG: 50 TABLET, COATED ORAL at 10:18

## 2019-09-08 RX ADMIN — SODIUM CHLORIDE, PRESERVATIVE FREE 50 ML: 5 INJECTION INTRAVENOUS at 14:30

## 2019-09-08 RX ADMIN — Medication 5000 UNITS: at 12:40

## 2019-09-08 RX ADMIN — SALINE NASAL SPRAY 1 SPRAY: 1.5 SOLUTION NASAL at 12:51

## 2019-09-08 RX ADMIN — LIDOCAINE HYDROCHLORIDE 3 ML: 10 INJECTION, SOLUTION EPIDURAL; INFILTRATION; INTRACAUDAL; PERINEURAL at 13:45

## 2019-09-08 RX ADMIN — MELATONIN 1000 UNITS: at 19:37

## 2019-09-08 RX ADMIN — ASPIRIN 325 MG ORAL TABLET 325 MG: 325 PILL ORAL at 10:17

## 2019-09-08 RX ADMIN — CYCLOSPORINE 175 MG: 100 SOLUTION ORAL at 17:50

## 2019-09-08 RX ADMIN — DEXTRAN 70 AND HYPROMELLOSE 2910 1 DROP: 1; 3 SOLUTION/ DROPS OPHTHALMIC at 16:19

## 2019-09-08 RX ADMIN — CLOTRIMAZOLE 1 TROCHE: 10 LOZENGE ORAL at 19:37

## 2019-09-08 RX ADMIN — BUMETANIDE 2 MG: 0.25 INJECTION INTRAMUSCULAR; INTRAVENOUS at 16:29

## 2019-09-08 RX ADMIN — SALINE NASAL SPRAY 1 SPRAY: 1.5 SOLUTION NASAL at 16:19

## 2019-09-08 RX ADMIN — SALINE NASAL SPRAY 1 SPRAY: 1.5 SOLUTION NASAL at 19:38

## 2019-09-08 RX ADMIN — CLOTRIMAZOLE 1 TROCHE: 10 LOZENGE ORAL at 16:19

## 2019-09-08 RX ADMIN — CYCLOSPORINE 150 MG: 100 SOLUTION ORAL at 10:16

## 2019-09-08 RX ADMIN — VALGANCICLOVIR HYDROCHLORIDE 450 MG: 50 POWDER, FOR SOLUTION ORAL at 10:17

## 2019-09-08 RX ADMIN — Medication 1 PACKET: at 19:38

## 2019-09-08 RX ADMIN — ALBUMIN HUMAN 12.5 G: 0.25 SOLUTION INTRAVENOUS at 12:40

## 2019-09-08 RX ADMIN — MYCOPHENOLATE MOFETIL 1000 MG: 200 POWDER, FOR SUSPENSION ORAL at 17:48

## 2019-09-08 RX ADMIN — LEVOTHYROXINE SODIUM 125 MCG: 0.12 TABLET ORAL at 10:18

## 2019-09-08 RX ADMIN — Medication 2 DROP: at 10:19

## 2019-09-08 RX ADMIN — DAPSONE 100 MG: 100 TABLET ORAL at 10:17

## 2019-09-08 RX ADMIN — ALBUMIN HUMAN 12.5 G: 0.25 SOLUTION INTRAVENOUS at 17:48

## 2019-09-08 ASSESSMENT — ACTIVITIES OF DAILY LIVING (ADL)
ADLS_ACUITY_SCORE: 17
ADLS_ACUITY_SCORE: 28
ADLS_ACUITY_SCORE: 17
ADLS_ACUITY_SCORE: 34

## 2019-09-08 NOTE — PROGRESS NOTES
Immunosuppression Note:    Nicci Pemberton is a 59 year old female who is seen today  for immunosuppression management     I, Mandeep Alford MD, I have examined the patient with the resident/PA/Fellow, discussed and agree with the note and findings.  I have reviewed today's vital signs, medications, labs and imaging. I reviewed the immunosuppression medications and levels. 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     Graft function: ALK phos, AST and ALT stable. Tbili increased to 1.4. R drain removed, L drain output decreasing, will plan to remove once consistently < 500ml/day. Output < 500 ml x 2 day. Discontinue drain today    Immunosuppression management: Tac now stopped due to prolonged delirium, CSA started at 300 mg BID. MMF increased to 1000 mg BID. Re-started pred 5mg due to transition between meds. First cyclosporine level came back high at 455. Dose held x 1 dose and restarted 150 mg BID. 9/6 level 123, 10 hr trough. Level 166 on 9/7, will increase CSA to 175mg BID . Goal - 200-300. Complexity of management: Medium. Contributing factors: anemia and induction , encephalopathy.     Hematology: Acute blood loss anemia. Transfusion goal hgb > 7. Hgb stable at this time. Last transfused 3 units 8/20. Will transfuse 1 unit(s) prbc today    HEENT: Post-op nasal bleeding due to friable mucosa, managed with packing, now resolved.     Cardiorespiratory: Patient extubated 8/24.   Hx of hypertension on spironolactone and bumex at home. SBP . HR 90s.     Neuro: Toxic and metabolic encephalopathy likely secondary to poor sleep, drugs (tac, pain medication), acute illness. Ordered melatonin and  bedtime seroquel to help patient sleep, patient's mental status has seemed to be improving overall, but now seems fluctuant again. MRI head completed- results unremarkable. Not sure if current confusion is related to supratherapeutic cyclosporine. Consulted neurology, recommend LP to rule out infectious etiology. Video EEG monitoring completed, no evidence of seizures.  9/5 LP, results thus far negative for infection. WBC 1. Protein 44 and glucose 51. Negative CSF studies: cryptococcal, enterovirus, HSV, VZV.      -VBG reviewed with fellow. Avoid medications that cause respiratory depression., BIPAP overnight, will consult Pulmonology today  -Continue scheduled melatonin and Seroquel at bedtime.   -Continue bedside sitter due to pulling at tubes.     GI/Nutrition: NAKIA. NJ in place. TF at goal. Now has bedside sitter due to confusion and pulling out NJs.     Endocrine: Steroid induced hyperglycemia, resolved  Fluid/Electrolytes: GAMAL- CRRT stopped and patient has required no further HD. Incontinent of urine, so difficult to quantify, but Cr now normalized. Cr 0.81. CO 33. UO adequate.  Drain output < 500 yesterday. Check weight. Bumex stopped. Poor PO intake due to confusion. TF and free water restarted. More alert today. Give albumin 5% 25 grams today. Will diurese with Bumex after Unit of blood transfused  : Incontinent  Infectious disease: Ppx with micafungin x 10 days completed, zosyn completed. PPx with dapsone and valcyte.  Prophylaxis: Heparin subcutaneous TID.   Activity: OOB to chair today. PT/OT.   Disposition: 7A     Medical Decision Making: Medium  Subsequent visit 09297 (moderate level decision making)    VEENA/Fellow/Resident Provider: Renee Allen MD Fellow [id number]    Faculty: NATHALY Alford MD    ____________________________________________________________  Transplant History: Admitted 8/16/2019 for acute decompensated ANGULO.   The patient has a history of liver failure due to nonalcoholic  steatopheatitis.    8/18/2019 (Liver), Postoperative day: 21     Interval History:     Overnight, VBG showed increased pCO2, started on BiPAP overnight.  Confused this AM  Not painful    ROS:   A 10-point review of systems was negative except as noted above.    Curent Meds:    aspirin  325 mg Oral Daily     bumetanide  2 mg Intravenous Once     carboxymethylcellulose PF  2 drop Both Eyes Q6H     clotrimazole  1 Molly Buccal 4x Daily     cycloSPORINE modified  150 mg Oral or Feeding Tube BID IS     dapsone  100 mg Oral Daily     famotidine  20 mg Oral Daily     heparin  5,000 Units Subcutaneous Q8H     levothyroxine  125 mcg Oral Daily     melatonin  3 mg Oral At Bedtime     multivitamins w/minerals  15 mL Per Feeding Tube Daily     mycophenolate  1,000 mg Oral BID IS    Or     mycophenolate  1,000 mg Oral or NG Tube BID IS     protein modular  1 packet Per Feeding Tube BID     sodium chloride  1 spray Both Nostrils Q4H     sodium chloride (PF)  3 mL Intravenous Q8H     sodium chloride (PF)  3 mL Intracatheter Q8H     valGANciclovir  450 mg Oral Daily    Or     valGANciclovir  450 mg Oral or NG Tube Daily     vitamin D3  1,000 Units Oral BID       Physical Exam:     Admit Weight: 104.3 kg (230 lb)    Current Vitals:   BP 96/73 (BP Location: Right arm)   Pulse 106   Temp 98.8  F (37.1  C) (Oral)   Resp 18   Wt 95.5 kg (210 lb 8.6 oz)   SpO2 98%   BMI 38.51 kg/m      Vital sign ranges:    Temp:  [97.8  F (36.6  C)-98.9  F (37.2  C)] 98.8  F (37.1  C)  Pulse:  [] 106  Heart Rate:  [] 105  Resp:  [16-20] 18  BP: ()/(61-86) 96/73  SpO2:  [90 %-99 %] 98 %  Patient Vitals for the past 24 hrs:   BP Temp Temp src Pulse Heart Rate Resp SpO2   09/08/19 0905 96/73 98.8  F (37.1  C) Oral 106 105 18 98 %   09/08/19 0819 120/86 98.9  F (37.2  C) Oral 98 107 18 97 %   09/08/19 0628 -- -- -- -- -- -- 94 %   09/08/19 0405 110/75 98.6  F (37  C) Axillary -- 99 20 99 %   09/08/19 0211 97/61 -- -- -- 92 -- 98 %    09/07/19 2324 104/64 97.8  F (36.6  C) Oral -- 90 18 97 %   09/07/19 1922 103/66 97.8  F (36.6  C) Oral -- 89 16 98 %   09/07/19 1535 -- -- -- -- -- -- 94 %   09/07/19 1517 103/66 98.9  F (37.2  C) Axillary -- 92 16 90 %   09/07/19 1310 107/82 98.3  F (36.8  C) Oral -- 92 16 95 %     General Appearance: NAD, confused  HEENT: Feeding tube removed  Skin: normal, warm, scattered bruising  Heart: RRR  Lungs: BCTA, NLB on 2L  Abdomen: soft, nondistended, incision sutured, c/d/i. OBED x 1 with serous output  : Wearing depends  Extremities: edema: present bilaterally. 3+. Wearing lymphedema wraps.  Neurologic: Confused, oriented to name only. No focal deficit. More alert this AM      Data:   CMP  Recent Labs   Lab 09/08/19  0559 09/07/19  0538    137   POTASSIUM 4.5 4.2   CHLORIDE 99 99   CO2 33* 33*   * 120*   BUN 21 20   CR 0.81 1.07*   GFRESTIMATED 79 57*   GFRESTBLACK >90 66   JACOB 8.3* 8.2*   MAG 2.0 2.1   PHOS 3.0 4.1   ALBUMIN 1.9* 1.7*   BILITOTAL 1.4* 1.4*   ALKPHOS 129 129   AST 25 27   ALT 24 23     CBC  Recent Labs   Lab 09/08/19  0559 09/07/19  0538   HGB 7.1* 7.0*   WBC 3.1* 2.4*    170         COAGS  Recent Labs   Lab 09/04/19  1433   INR 1.11

## 2019-09-08 NOTE — PLAN OF CARE
Discharge Planner OT   Patient plan for discharge: not stated   Current status: Pt lethargic and confused, with nonsensical conversation at times, however, able to follow commands appropriately. Tech present for assistance throughout session. Pt completed bed mobility with modA x 1 for BLE management. Pt completed supine > sit transfer to EOB with Rafa x 2. Pt sat unsupported at EOB x 5 min without LOB. Pt did verbalize dizziness with positional changes. Pt completed STS -> stand pivot to chair transfer with modA x 2 for steadying assist and VCs throughout for appropriate foot placement. Pt was oriented to person and time, but not place or situation throughout session.   Barriers to return to prior living situation: cognitive function, weakness, deconditioning, pain  Recommendations for discharge: TCU  Rationale for recommendations: Pt will need further skilled therapy to further progress (I) in meaningful activities        Entered by: Sebastian Sanders 09/08/2019 12:50 PM

## 2019-09-08 NOTE — PLAN OF CARE
Edema 7A: Recommend continued use of LE compression wraps for further management of 1+/2+ pitting edema and protection of skin integrity. Reapplication of GCB from MTPs to knee creases. Remove wraps if causing pain/numbness or become soiled.

## 2019-09-08 NOTE — PLAN OF CARE
VS: VSS, afebrile, on 1 L oxygen via NC; switched to BiPAP overnight   Mental Status: A&O x1 overnight; forgetful to time, date, situation; sitter at bedside for history of pulling out feeding tube/s; continent of urine this shift   Cardiac: WNL, denies chest pain   Respiratory: LS diminished in the bases, denies SOB, on oxygen/no previous home oxygen; serial VBG's with increased CO2, started on BiPAP overnight  GI/: voiding icteric colored urine via bedpan; see I&O's; abdomen rounded, + bowel tones, + flatus, no BM this shift; clam-shell incision SILVIA with sutures; L OBED leaking via insertion site; dressing changed PRN; NJ tube patent to tube feed @ 50, goal rate.   Pain: denied pain   Diet: regular diet, continuous tube feed @ goal of 50 mL/hr  Mobility: NOOB this shift; PT/OT following - up with 2 assist, lift dependent   Treatment: bipap, increase activity tolerance; monitor for mentation clear/ing   DC plan: TBD; probable TCU prior to home w/

## 2019-09-08 NOTE — PLAN OF CARE
D/I/A:  Pt up in chair for a few hours this evening; tolerated well.  Up with lift or heavy assist of 2.  A+O x2-3; attendant at bedside for safety(pulling at lines/tubing); redirectable.  C/o HA; medicated effectively with current med orders.  VSSA.  Pleasant and cooperative with cares and treatments.  Incontinent of B/B.  Medium loose stool x1 thus far this evening.  Tolerating oral intake of fluids; refused dinner.  Encourage boost but declined.  Tube feeding at goal.  L abdominal OBED bulb compressed with fair amount of output, 175ml up to this time, serous drainage.  Dressing saturated this evening; new dressing applied.  Incision C/D/I and SILVIA, sutures intact; healing well.    P: Continue to monitor status.  Maintain safety with bedside attendant.

## 2019-09-09 ENCOUNTER — APPOINTMENT (OUTPATIENT)
Dept: OCCUPATIONAL THERAPY | Facility: CLINIC | Age: 59
DRG: 005 | End: 2019-09-09
Payer: COMMERCIAL

## 2019-09-09 ENCOUNTER — APPOINTMENT (OUTPATIENT)
Dept: PHYSICAL THERAPY | Facility: CLINIC | Age: 59
DRG: 005 | End: 2019-09-09
Payer: COMMERCIAL

## 2019-09-09 LAB
ALBUMIN SERPL-MCNC: 2.3 G/DL (ref 3.4–5)
ALP SERPL-CCNC: 128 U/L (ref 40–150)
ALT SERPL W P-5'-P-CCNC: 24 U/L (ref 0–50)
ANION GAP SERPL CALCULATED.3IONS-SCNC: 4 MMOL/L (ref 3–14)
ANISOCYTOSIS BLD QL SMEAR: ABNORMAL
AST SERPL W P-5'-P-CCNC: 28 U/L (ref 0–45)
BASOPHILS # BLD AUTO: 0 10E9/L (ref 0–0.2)
BASOPHILS NFR BLD AUTO: 0.9 %
BILIRUB DIRECT SERPL-MCNC: 0.9 MG/DL (ref 0–0.2)
BILIRUB SERPL-MCNC: 1.3 MG/DL (ref 0.2–1.3)
BUN SERPL-MCNC: 22 MG/DL (ref 7–30)
CALCIUM SERPL-MCNC: 8.4 MG/DL (ref 8.5–10.1)
CHLORIDE SERPL-SCNC: 97 MMOL/L (ref 94–109)
CO2 SERPL-SCNC: 35 MMOL/L (ref 20–32)
CREAT SERPL-MCNC: 0.94 MG/DL (ref 0.52–1.04)
CYCLOSPORINE BLD LC/MS/MS-MCNC: 148 UG/L (ref 50–400)
DIAGNOSTIC IMP SPEC-IMP: NOT DETECTED
DIFFERENTIAL METHOD BLD: ABNORMAL
EBV DNA # SPEC NAA+PROBE: <390 CPY/ML
EBV DNA SPEC NAA+PROBE-LOG#: <2.6 LOG
EOSINOPHIL # BLD AUTO: 0.1 10E9/L (ref 0–0.7)
EOSINOPHIL NFR BLD AUTO: 3.6 %
ERYTHROCYTE [DISTWIDTH] IN BLOOD BY AUTOMATED COUNT: 19.4 % (ref 10–15)
GFR SERPL CREATININE-BSD FRML MDRD: 66 ML/MIN/{1.73_M2}
GLUCOSE SERPL-MCNC: 129 MG/DL (ref 70–99)
HCT VFR BLD AUTO: 25.3 % (ref 35–47)
HGB BLD-MCNC: 7.6 G/DL (ref 11.7–15.7)
LAB SCANNED RESULT: NORMAL
LYMPHOCYTES # BLD AUTO: 0.3 10E9/L (ref 0.8–5.3)
LYMPHOCYTES NFR BLD AUTO: 8 %
MAGNESIUM SERPL-MCNC: 1.6 MG/DL (ref 1.6–2.3)
MCH RBC QN AUTO: 29.8 PG (ref 26.5–33)
MCHC RBC AUTO-ENTMCNC: 30 G/DL (ref 31.5–36.5)
MCV RBC AUTO: 99 FL (ref 78–100)
METAMYELOCYTES # BLD: 0 10E9/L
METAMYELOCYTES NFR BLD MANUAL: 0.9 %
MONOCYTES # BLD AUTO: 0.5 10E9/L (ref 0–1.3)
MONOCYTES NFR BLD AUTO: 13.4 %
MYELOCYTES # BLD: 0 10E9/L
MYELOCYTES NFR BLD MANUAL: 0.9 %
NEUTROPHILS # BLD AUTO: 2.5 10E9/L (ref 1.6–8.3)
NEUTROPHILS NFR BLD AUTO: 72.3 %
PHOSPHATE SERPL-MCNC: 3.2 MG/DL (ref 2.5–4.5)
PLATELET # BLD AUTO: 172 10E9/L (ref 150–450)
PLATELET # BLD EST: ABNORMAL 10*3/UL
POTASSIUM SERPL-SCNC: 4.1 MMOL/L (ref 3.4–5.3)
PROT SERPL-MCNC: 5.1 G/DL (ref 6.8–8.8)
RBC # BLD AUTO: 2.55 10E12/L (ref 3.8–5.2)
SODIUM SERPL-SCNC: 136 MMOL/L (ref 133–144)
SPECIMEN SOURCE: NORMAL
TME LAST DOSE: NORMAL H
WBC # BLD AUTO: 3.4 10E9/L (ref 4–11)

## 2019-09-09 PROCEDURE — 97530 THERAPEUTIC ACTIVITIES: CPT | Mod: GP | Performed by: REHABILITATION PRACTITIONER

## 2019-09-09 PROCEDURE — 85025 COMPLETE CBC W/AUTO DIFF WBC: CPT | Performed by: TRANSPLANT SURGERY

## 2019-09-09 PROCEDURE — 97535 SELF CARE MNGMENT TRAINING: CPT | Mod: GO | Performed by: OCCUPATIONAL THERAPIST

## 2019-09-09 PROCEDURE — 97110 THERAPEUTIC EXERCISES: CPT | Mod: GP | Performed by: REHABILITATION PRACTITIONER

## 2019-09-09 PROCEDURE — 25000132 ZZH RX MED GY IP 250 OP 250 PS 637: Performed by: PHYSICIAN ASSISTANT

## 2019-09-09 PROCEDURE — 25000131 ZZH RX MED GY IP 250 OP 636 PS 637: Performed by: TRANSPLANT SURGERY

## 2019-09-09 PROCEDURE — P9047 ALBUMIN (HUMAN), 25%, 50ML: HCPCS | Performed by: PHYSICIAN ASSISTANT

## 2019-09-09 PROCEDURE — 80158 DRUG ASSAY CYCLOSPORINE: CPT | Performed by: PHYSICIAN ASSISTANT

## 2019-09-09 PROCEDURE — 83735 ASSAY OF MAGNESIUM: CPT | Performed by: TRANSPLANT SURGERY

## 2019-09-09 PROCEDURE — 84100 ASSAY OF PHOSPHORUS: CPT | Performed by: TRANSPLANT SURGERY

## 2019-09-09 PROCEDURE — 97110 THERAPEUTIC EXERCISES: CPT | Mod: GO | Performed by: OCCUPATIONAL THERAPIST

## 2019-09-09 PROCEDURE — 25000128 H RX IP 250 OP 636: Performed by: PHYSICIAN ASSISTANT

## 2019-09-09 PROCEDURE — 12000026 ZZH R&B TRANSPLANT

## 2019-09-09 PROCEDURE — 25000132 ZZH RX MED GY IP 250 OP 250 PS 637: Performed by: TRANSPLANT SURGERY

## 2019-09-09 PROCEDURE — 97530 THERAPEUTIC ACTIVITIES: CPT | Mod: GO | Performed by: OCCUPATIONAL THERAPIST

## 2019-09-09 PROCEDURE — 82248 BILIRUBIN DIRECT: CPT | Performed by: TRANSPLANT SURGERY

## 2019-09-09 PROCEDURE — 80053 COMPREHEN METABOLIC PANEL: CPT | Performed by: TRANSPLANT SURGERY

## 2019-09-09 PROCEDURE — 25000132 ZZH RX MED GY IP 250 OP 250 PS 637: Performed by: SURGERY

## 2019-09-09 PROCEDURE — 27210429 ZZH NUTRITION PRODUCT INTERMEDIATE LITER

## 2019-09-09 PROCEDURE — 25000125 ZZHC RX 250: Performed by: PHYSICIAN ASSISTANT

## 2019-09-09 RX ORDER — MAGNESIUM OXIDE 400 MG/1
400 TABLET ORAL DAILY
Status: DISCONTINUED | OUTPATIENT
Start: 2019-09-09 | End: 2019-09-23 | Stop reason: HOSPADM

## 2019-09-09 RX ORDER — ALBUMIN (HUMAN) 12.5 G/50ML
12.5 SOLUTION INTRAVENOUS EVERY 6 HOURS
Status: COMPLETED | OUTPATIENT
Start: 2019-09-09 | End: 2019-09-10

## 2019-09-09 RX ADMIN — ALBUMIN HUMAN 12.5 G: 0.25 SOLUTION INTRAVENOUS at 00:12

## 2019-09-09 RX ADMIN — MELATONIN 1000 UNITS: at 20:16

## 2019-09-09 RX ADMIN — TRAMADOL HYDROCHLORIDE 50 MG: 50 TABLET, COATED ORAL at 10:32

## 2019-09-09 RX ADMIN — Medication 2 DROP: at 04:08

## 2019-09-09 RX ADMIN — VALGANCICLOVIR HYDROCHLORIDE 450 MG: 50 POWDER, FOR SOLUTION ORAL at 07:44

## 2019-09-09 RX ADMIN — Medication 1 PACKET: at 20:17

## 2019-09-09 RX ADMIN — MYCOPHENOLATE MOFETIL 1000 MG: 200 POWDER, FOR SUSPENSION ORAL at 07:43

## 2019-09-09 RX ADMIN — Medication 1 PACKET: at 14:53

## 2019-09-09 RX ADMIN — CYCLOSPORINE 175 MG: 100 SOLUTION ORAL at 17:33

## 2019-09-09 RX ADMIN — ASPIRIN 325 MG ORAL TABLET 325 MG: 325 PILL ORAL at 07:43

## 2019-09-09 RX ADMIN — Medication 5000 UNITS: at 14:47

## 2019-09-09 RX ADMIN — LEVOTHYROXINE SODIUM 125 MCG: 0.12 TABLET ORAL at 07:43

## 2019-09-09 RX ADMIN — Medication 2 DROP: at 16:13

## 2019-09-09 RX ADMIN — ONDANSETRON 4 MG: 2 INJECTION INTRAMUSCULAR; INTRAVENOUS at 08:06

## 2019-09-09 RX ADMIN — ALBUMIN HUMAN 12.5 G: 0.25 SOLUTION INTRAVENOUS at 22:27

## 2019-09-09 RX ADMIN — ALBUMIN HUMAN 12.5 G: 0.25 SOLUTION INTRAVENOUS at 06:16

## 2019-09-09 RX ADMIN — MYCOPHENOLATE MOFETIL 1000 MG: 200 POWDER, FOR SUSPENSION ORAL at 17:33

## 2019-09-09 RX ADMIN — MAGNESIUM OXIDE TAB 400 MG (241.3 MG ELEMENTAL MG) 400 MG: 400 (241.3 MG) TAB at 10:32

## 2019-09-09 RX ADMIN — MULTIVITAMIN 15 ML: LIQUID ORAL at 07:43

## 2019-09-09 RX ADMIN — CYCLOSPORINE 175 MG: 100 SOLUTION ORAL at 07:44

## 2019-09-09 RX ADMIN — ALBUMIN HUMAN 12.5 G: 0.25 SOLUTION INTRAVENOUS at 10:31

## 2019-09-09 RX ADMIN — CLOTRIMAZOLE 1 TROCHE: 10 LOZENGE ORAL at 16:13

## 2019-09-09 RX ADMIN — DAPSONE 100 MG: 100 TABLET ORAL at 07:44

## 2019-09-09 RX ADMIN — FAMOTIDINE 20 MG: 20 TABLET ORAL at 07:43

## 2019-09-09 RX ADMIN — Medication 5000 UNITS: at 20:16

## 2019-09-09 RX ADMIN — Medication 5000 UNITS: at 04:08

## 2019-09-09 RX ADMIN — TRAMADOL HYDROCHLORIDE 50 MG: 50 TABLET, COATED ORAL at 02:21

## 2019-09-09 RX ADMIN — MELATONIN 1000 UNITS: at 07:43

## 2019-09-09 RX ADMIN — SALINE NASAL SPRAY 1 SPRAY: 1.5 SOLUTION NASAL at 04:08

## 2019-09-09 RX ADMIN — ALBUMIN HUMAN 12.5 G: 0.25 SOLUTION INTRAVENOUS at 16:13

## 2019-09-09 ASSESSMENT — ACTIVITIES OF DAILY LIVING (ADL)
ADLS_ACUITY_SCORE: 24
ADLS_ACUITY_SCORE: 28

## 2019-09-09 ASSESSMENT — PAIN DESCRIPTION - DESCRIPTORS: DESCRIPTORS: HEADACHE

## 2019-09-09 NOTE — PLAN OF CARE
D/I/A: Pt up in chair for 2 hours this evening.  Encouraged to sit upright during dinner.  Poor intake.  Encouraged boost juice supplement over water; tolerating moderately well.  Able to take small pills.  A+O x1 and redirectable; held bedtime sleeping aides d/t sleepiness; will continue to monitor.  Bedside attendant in place.  Became mildly agitated during dinner time with c/o headache, back ache and stomach ache; medicated for pain effectively with current med orders.  Voiding frequently s/p x1 Bumex dose p x1 unit of PRBCs; tolerated transfusion well.  BM x1 thus far this evening;  loose, brown stool; biliary tube noted in stool.  Continent of B/B this evening.  Tolerating tube feeding.   at bedside during dinner and offered encouragement and support.  Resting between cares and treatment.    P: Continue to monitor status.  Monitor mentation for acute decline or behavioral changes.

## 2019-09-09 NOTE — PLAN OF CARE
/69 (BP Location: Right arm)   Pulse 84   Temp 98.2  F (36.8  C) (Oral)   Resp 20   Wt 97 kg (213 lb 13.5 oz)   SpO2 96%   BMI 39.11 kg/m       [id number]  AVSS on RA- at times 1L; when sleeping. No SOB stated by patient. Sitter by bedside throughout shift. Pain managed with PRN tramadol given x1. IV zofran given this AM due to patient dry heaving after medication administration. No appetite. Regular diet; calorie count. TF into NJ at goal rate of 50 ml/hr. Tolerating well. Triple lumen CVC; saline locked; PIV saline locked. Left OBED pulled this weekend; leaky site. Up with lift. Was up in chair with therapy. Had family be bedside this afternoon for some time. Only orientated to self. Continue plan of care; please notify MD with any changes.

## 2019-09-09 NOTE — PLAN OF CARE
Discharge Planner PT  7A  Patient plan for discharge: Unable to state, pt remains confused  Current status: Pt tx supine->sit with Min A. Pt tx sit->stand x 3 trials with Min A x 2. Pt took 4 steps to bedside chair with Min A x 2 and WW.   Barriers to return to prior living situation: medical status, cognition, decreased strength, edema, activity intolerance  Recommendations for discharge: TCU  Rationale for recommendations: Pt would benefit from additional skilled PT to address functional mobility, transfers, gait and safety awareness.        Entered by: Lela Quintanilla 09/09/2019 10:30 AM

## 2019-09-09 NOTE — PLAN OF CARE
Discharge Planner OT   Patient plan for discharge: Unstated  Current status: Pt completed sit to stands during session with min A x2 for safety, Pt requiring simple step by step directions during session to participate in session, including during ADL  Barriers to return to prior living situation: Medical status, precautions, cognition  Recommendations for discharge: TCU  Rationale for recommendations: Pt will benefit from continued therapy to increase endurance and independence with ADL       Entered by: Ben Anderson 09/09/2019 3:56 PM

## 2019-09-09 NOTE — PLAN OF CARE
Bedside attendant present. VSS on 2L per nasal cannula. A&O*1, oriented to self. Regular diet, tolerating sips with medications during night. Generalized pain noted, tramadol given x1. Transverse abdominal wound with sutures, SILVIA. OBED removal site dressing changed x1. NJ tube in place with TF at 50mL/hr. Triple lumen internal jugular in place, SL, white port is occluded. Albumin scheduled Q6h. Patient is resting between cares. Continue with POC.    Addendum: At 0650 Patient was A&O*3, disoriented to time.

## 2019-09-09 NOTE — PROGRESS NOTES
Transplant Surgery  Inpatient Daily Progress Note  09/09/2019    Assessment & Plan: Nicci Pemberton is a 60 yo female with a past medical history significant for end stage liver disease 2/2 ANGULO and alpha 1 anti-trypsin deficiency now s/p DD OLT on 8/18/19.     Graft function: ALK phos, AST and ALT stable. Tbili increased to 1.4<-1.3.     Immunosuppression management: Tac now stopped due to prolonged delirium, CSA started. MMF increased to 1000 mg BID. Re-started pred 5mg due to transition between meds.  mg BID. 9/9 level 148 (12.5 hour trough). Increased dose to 175 mg BID yesterday so no change at this time. Goal - 200-300. Complexity of management: Medium. Contributing factors: anemia and induction , encephalopathy.     Hematology: Acute blood loss anemia. Transfusion goal hgb > 7. Hgb 7.6 from 7.1 post transfusion. Last transfused 3 units 8/20 and 1 unit 9/8.     HEENT: Post-op nasal bleeding due to friable mucosa, managed with packing, now resolved.     Cardiorespiratory: Patient extubated 8/24.   Hx of hypertension on spironolactone and bumex at home.     Neuro: Toxic and metabolic encephalopathy likely secondary to poor sleep, drugs (tac, pain medication), acute illness. Ordered melatonin and bedtime seroquel to help patient sleep, patient's mental status has seemed to be improving overall, but now seems fluctuant again. MRI head completed- results unremarkable. Discontinued Tacrolimus. Consulted neurology, recommend LP to rule out infectious etiology. Video EEG monitoring completed, no evidence of seizures.  9/5 LP, results thus far negative for infection. WBC 1. Protein 44 and glucose 51. Negative CSF studies: cryptococcal, enterovirus, HSV, VZV.    -VBG reviewed with fellow. Avoid medications that cause respiratory depression., BIPAP overnight  -Continue scheduled melatonin and Seroquel at bedtime.   -Continue bedside sitter due to pulling at tubes.     GI/Nutrition: NAKIA. NJ in place. TF at goal. Now  has bedside sitter due to confusion and pulling out NJs.     Endocrine: Steroid induced hyperglycemia, resolved  Fluid/Electrolytes: GAMAL- CRRT stopped and patient has required no further HD. Incontinent of urine, so difficult to quantify, but Cr now normalized. Cr 0.9. CO 35. UO adequate. Bumex stopped. Poor PO intake due to confusion. TF and free water restarted. More alert today. Give albumin 5% 25 grams every 6 hours x4 today.   : Incontinent  Infectious disease: Ppx with micafungin x 10 days completed, zosyn completed. PPx with dapsone and valcyte.  Prophylaxis: Heparin subcutaneous TID.   Activity: OOB to chair today. PT/OT.   Disposition: 7A     Medical Decision Making: Medium  Subsequent visit 47933 (moderate level decision making)    VEENA/Fellow/Resident Provider: Juliet Philippe PA-C     Faculty: Selena Howard MD    ____________________________________________________________  Transplant History: Admitted 8/16/2019 for acute decompensated ANGULO.   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    8/18/2019 (Liver), Postoperative day: 22     Interval History:   Confused this AM  No complaints    ROS:   A 10-point review of systems was negative except as noted above.    Curent Meds:    albumin human  12.5 g Intravenous Q6H     aspirin  325 mg Oral Daily     carboxymethylcellulose PF  2 drop Both Eyes Q6H     clotrimazole  1 Molly Buccal 4x Daily     cycloSPORINE modified  175 mg Oral BID IS     dapsone  100 mg Oral Daily     famotidine  20 mg Oral Daily     heparin  5,000 Units Subcutaneous Q8H     levothyroxine  125 mcg Oral Daily     magnesium oxide  400 mg Oral Daily     melatonin  3 mg Oral At Bedtime     multivitamins w/minerals  15 mL Per Feeding Tube Daily     mycophenolate  1,000 mg Oral BID IS    Or     mycophenolate  1,000 mg Oral or NG Tube BID IS     protein modular  1 packet Per Feeding Tube BID     sodium chloride  1 spray Both Nostrils Q4H     sodium chloride  (PF)  3 mL Intravenous Q8H     sodium chloride (PF)  3 mL Intracatheter Q8H     valGANciclovir  450 mg Oral Daily    Or     valGANciclovir  450 mg Oral or NG Tube Daily     vitamin D3  1,000 Units Oral BID       Physical Exam:     Admit Weight: 104.3 kg (230 lb)    Current Vitals:   /69 (BP Location: Right arm)   Pulse 84   Temp 98.2  F (36.8  C) (Oral)   Resp 20   Wt 97 kg (213 lb 13.5 oz)   SpO2 96%   BMI 39.11 kg/m      Vital sign ranges:    Temp:  [97.4  F (36.3  C)-99.4  F (37.4  C)] 98.2  F (36.8  C)  Pulse:  [84-99] 84  Heart Rate:  [84-88] 84  Resp:  [18-20] 20  BP: ()/(64-71) 110/69  SpO2:  [95 %-97 %] 96 %  Patient Vitals for the past 24 hrs:   BP Temp Temp src Pulse Heart Rate Resp SpO2 Weight   09/09/19 1153 110/69 98.2  F (36.8  C) Oral 84 84 20 96 % --   09/09/19 0717 105/70 97.9  F (36.6  C) Oral 86 88 20 95 % --   09/09/19 0549 114/67 97.4  F (36.3  C) Oral 90 -- 20 95 % --   09/09/19 0400 -- -- -- -- -- -- -- 97 kg (213 lb 13.5 oz)   09/09/19 0037 101/64 98.6  F (37  C) Oral 95 -- 20 -- --   09/08/19 1917 109/67 98.6  F (37  C) Axillary 98 -- 20 96 % --   09/08/19 1615 106/67 98.6  F (37  C) Oral 96 -- 20 97 % --   09/08/19 1515 102/71 98.5  F (36.9  C) Oral 94 -- 20 97 % --   09/08/19 1430 99/65 99.4  F (37.4  C) Oral 97 -- 18 97 % --   09/08/19 1415 100/67 98.4  F (36.9  C) -- 95 -- 20 96 % --   09/08/19 1245 101/70 99  F (37.2  C) Oral 99 -- 20 96 % --     General Appearance: NAD, confused  HEENT: Feeding tube in place  Skin: normal, warm, scattered bruising  Heart: RRR  Lungs: NLB on 2L  Abdomen: soft, nondistended, incision sutured, c/d/i.   : Wearing depends  Extremities: edema: present bilaterally. 2+. Wearing lymphedema wraps.  Neurologic: Confused, oriented to name only. No focal deficit. More alert this AM      Data:   CMP  Recent Labs   Lab 09/09/19  0630 09/08/19  0559    137   POTASSIUM 4.1 4.5   CHLORIDE 97 99   CO2 35* 33*   * 148*   BUN 22 21   CR  0.94 0.81   GFRESTIMATED 66 79   GFRESTBLACK 76 >90   JACOB 8.4* 8.3*   MAG 1.6 2.0   PHOS 3.2 3.0   ALBUMIN 2.3* 1.9*   BILITOTAL 1.3 1.4*   ALKPHOS 128 129   AST 28 25   ALT 24 24     CBC  Recent Labs   Lab 09/09/19  0630 09/08/19  0559   HGB 7.6* 7.1*   WBC 3.4* 3.1*    174         COAGS  Recent Labs   Lab 09/04/19  1433   INR 1.11

## 2019-09-10 ENCOUNTER — APPOINTMENT (OUTPATIENT)
Dept: PHYSICAL THERAPY | Facility: CLINIC | Age: 59
DRG: 005 | End: 2019-09-10
Payer: COMMERCIAL

## 2019-09-10 ENCOUNTER — APPOINTMENT (OUTPATIENT)
Dept: OCCUPATIONAL THERAPY | Facility: CLINIC | Age: 59
DRG: 005 | End: 2019-09-10
Payer: COMMERCIAL

## 2019-09-10 LAB
ALBUMIN SERPL-MCNC: 2.7 G/DL (ref 3.4–5)
ALP SERPL-CCNC: 137 U/L (ref 40–150)
ALT SERPL W P-5'-P-CCNC: 29 U/L (ref 0–50)
ANION GAP SERPL CALCULATED.3IONS-SCNC: 5 MMOL/L (ref 3–14)
ANISOCYTOSIS BLD QL SMEAR: ABNORMAL
AST SERPL W P-5'-P-CCNC: 39 U/L (ref 0–45)
BACTERIA SPEC CULT: NO GROWTH
BASOPHILS # BLD AUTO: 0 10E9/L (ref 0–0.2)
BASOPHILS NFR BLD AUTO: 0 %
BILIRUB DIRECT SERPL-MCNC: 0.9 MG/DL (ref 0–0.2)
BILIRUB SERPL-MCNC: 1.7 MG/DL (ref 0.2–1.3)
BUN SERPL-MCNC: 27 MG/DL (ref 7–30)
CALCIUM SERPL-MCNC: 8.8 MG/DL (ref 8.5–10.1)
CHLORIDE SERPL-SCNC: 97 MMOL/L (ref 94–109)
CHLORIDE UR-SCNC: 12 MMOL/L
CO2 SERPL-SCNC: 38 MMOL/L (ref 20–32)
CREAT SERPL-MCNC: 0.86 MG/DL (ref 0.52–1.04)
CYCLOSPORINE BLD LC/MS/MS-MCNC: 150 UG/L (ref 50–400)
DIFFERENTIAL METHOD BLD: ABNORMAL
EOSINOPHIL # BLD AUTO: 0.3 10E9/L (ref 0–0.7)
EOSINOPHIL NFR BLD AUTO: 6.4 %
ERYTHROCYTE [DISTWIDTH] IN BLOOD BY AUTOMATED COUNT: 18.7 % (ref 10–15)
GFR SERPL CREATININE-BSD FRML MDRD: 73 ML/MIN/{1.73_M2}
GLUCOSE BLDC GLUCOMTR-MCNC: 154 MG/DL (ref 70–99)
GLUCOSE BLDC GLUCOMTR-MCNC: 174 MG/DL (ref 70–99)
GLUCOSE SERPL-MCNC: 130 MG/DL (ref 70–99)
HCT VFR BLD AUTO: 27.5 % (ref 35–47)
HGB BLD-MCNC: 8 G/DL (ref 11.7–15.7)
LYMPHOCYTES # BLD AUTO: 0.3 10E9/L (ref 0.8–5.3)
LYMPHOCYTES NFR BLD AUTO: 6.4 %
Lab: NORMAL
MAGNESIUM SERPL-MCNC: 1.8 MG/DL (ref 1.6–2.3)
MCH RBC QN AUTO: 29.4 PG (ref 26.5–33)
MCHC RBC AUTO-ENTMCNC: 29.1 G/DL (ref 31.5–36.5)
MCV RBC AUTO: 101 FL (ref 78–100)
MONOCYTES # BLD AUTO: 0.5 10E9/L (ref 0–1.3)
MONOCYTES NFR BLD AUTO: 11.8 %
MYELOCYTES # BLD: 0 10E9/L
MYELOCYTES NFR BLD MANUAL: 0.9 %
NEUTROPHILS # BLD AUTO: 3.2 10E9/L (ref 1.6–8.3)
NEUTROPHILS NFR BLD AUTO: 74.5 %
PHOSPHATE SERPL-MCNC: 3.1 MG/DL (ref 2.5–4.5)
PLATELET # BLD AUTO: 174 10E9/L (ref 150–450)
PLATELET # BLD EST: ABNORMAL 10*3/UL
POLYCHROMASIA BLD QL SMEAR: SLIGHT
POTASSIUM SERPL-SCNC: 5.1 MMOL/L (ref 3.4–5.3)
POTASSIUM UR-SCNC: 30 MMOL/L
PROT SERPL-MCNC: 5.4 G/DL (ref 6.8–8.8)
RBC # BLD AUTO: 2.72 10E12/L (ref 3.8–5.2)
SODIUM SERPL-SCNC: 139 MMOL/L (ref 133–144)
SODIUM UR-SCNC: 17 MMOL/L
SPECIMEN SOURCE: NORMAL
TME LAST DOSE: NORMAL H
WBC # BLD AUTO: 4.3 10E9/L (ref 4–11)

## 2019-09-10 PROCEDURE — 25000132 ZZH RX MED GY IP 250 OP 250 PS 637: Performed by: PHYSICIAN ASSISTANT

## 2019-09-10 PROCEDURE — 82248 BILIRUBIN DIRECT: CPT | Performed by: TRANSPLANT SURGERY

## 2019-09-10 PROCEDURE — 80158 DRUG ASSAY CYCLOSPORINE: CPT | Performed by: PHYSICIAN ASSISTANT

## 2019-09-10 PROCEDURE — 25000131 ZZH RX MED GY IP 250 OP 636 PS 637: Performed by: TRANSPLANT SURGERY

## 2019-09-10 PROCEDURE — 25000131 ZZH RX MED GY IP 250 OP 636 PS 637: Performed by: PHYSICIAN ASSISTANT

## 2019-09-10 PROCEDURE — 97110 THERAPEUTIC EXERCISES: CPT | Mod: GP

## 2019-09-10 PROCEDURE — 84100 ASSAY OF PHOSPHORUS: CPT | Performed by: TRANSPLANT SURGERY

## 2019-09-10 PROCEDURE — 83735 ASSAY OF MAGNESIUM: CPT | Performed by: TRANSPLANT SURGERY

## 2019-09-10 PROCEDURE — 00000146 ZZHCL STATISTIC GLUCOSE BY METER IP

## 2019-09-10 PROCEDURE — 25000132 ZZH RX MED GY IP 250 OP 250 PS 637: Performed by: SURGERY

## 2019-09-10 PROCEDURE — 25000125 ZZHC RX 250: Performed by: PHYSICIAN ASSISTANT

## 2019-09-10 PROCEDURE — 97530 THERAPEUTIC ACTIVITIES: CPT | Mod: GO

## 2019-09-10 PROCEDURE — 85025 COMPLETE CBC W/AUTO DIFF WBC: CPT | Performed by: TRANSPLANT SURGERY

## 2019-09-10 PROCEDURE — 25000128 H RX IP 250 OP 636: Performed by: PHYSICIAN ASSISTANT

## 2019-09-10 PROCEDURE — 27210429 ZZH NUTRITION PRODUCT INTERMEDIATE LITER

## 2019-09-10 PROCEDURE — 80053 COMPREHEN METABOLIC PANEL: CPT | Performed by: TRANSPLANT SURGERY

## 2019-09-10 PROCEDURE — 36415 COLL VENOUS BLD VENIPUNCTURE: CPT | Performed by: TRANSPLANT SURGERY

## 2019-09-10 PROCEDURE — 82436 ASSAY OF URINE CHLORIDE: CPT | Performed by: STUDENT IN AN ORGANIZED HEALTH CARE EDUCATION/TRAINING PROGRAM

## 2019-09-10 PROCEDURE — 12000026 ZZH R&B TRANSPLANT

## 2019-09-10 PROCEDURE — 97530 THERAPEUTIC ACTIVITIES: CPT | Mod: GP

## 2019-09-10 PROCEDURE — 84300 ASSAY OF URINE SODIUM: CPT | Performed by: STUDENT IN AN ORGANIZED HEALTH CARE EDUCATION/TRAINING PROGRAM

## 2019-09-10 PROCEDURE — 25000125 ZZHC RX 250: Performed by: STUDENT IN AN ORGANIZED HEALTH CARE EDUCATION/TRAINING PROGRAM

## 2019-09-10 PROCEDURE — 97535 SELF CARE MNGMENT TRAINING: CPT | Mod: GO

## 2019-09-10 PROCEDURE — 84133 ASSAY OF URINE POTASSIUM: CPT | Performed by: STUDENT IN AN ORGANIZED HEALTH CARE EDUCATION/TRAINING PROGRAM

## 2019-09-10 PROCEDURE — P9047 ALBUMIN (HUMAN), 25%, 50ML: HCPCS | Performed by: PHYSICIAN ASSISTANT

## 2019-09-10 PROCEDURE — 25000132 ZZH RX MED GY IP 250 OP 250 PS 637: Performed by: TRANSPLANT SURGERY

## 2019-09-10 RX ORDER — LIDOCAINE HYDROCHLORIDE 10 MG/ML
5 INJECTION, SOLUTION EPIDURAL; INFILTRATION; INTRACAUDAL; PERINEURAL ONCE
Status: COMPLETED | OUTPATIENT
Start: 2019-09-10 | End: 2019-09-10

## 2019-09-10 RX ORDER — CYCLOSPORINE 100 MG/ML
200 SOLUTION ORAL
Status: DISCONTINUED | OUTPATIENT
Start: 2019-09-10 | End: 2019-09-15

## 2019-09-10 RX ADMIN — FAMOTIDINE 20 MG: 20 TABLET ORAL at 08:55

## 2019-09-10 RX ADMIN — VALGANCICLOVIR HYDROCHLORIDE 450 MG: 50 POWDER, FOR SOLUTION ORAL at 08:54

## 2019-09-10 RX ADMIN — MAGNESIUM OXIDE TAB 400 MG (241.3 MG ELEMENTAL MG) 400 MG: 400 (241.3 MG) TAB at 12:58

## 2019-09-10 RX ADMIN — MELATONIN 1000 UNITS: at 19:54

## 2019-09-10 RX ADMIN — MELATONIN TAB 3 MG 3 MG: 3 TAB at 22:37

## 2019-09-10 RX ADMIN — SALINE NASAL SPRAY 1 SPRAY: 1.5 SOLUTION NASAL at 05:04

## 2019-09-10 RX ADMIN — LIDOCAINE HYDROCHLORIDE 5 ML: 10 INJECTION, SOLUTION EPIDURAL; INFILTRATION; INTRACAUDAL; PERINEURAL at 13:01

## 2019-09-10 RX ADMIN — DAPSONE 100 MG: 100 TABLET ORAL at 08:55

## 2019-09-10 RX ADMIN — Medication 5000 UNITS: at 12:58

## 2019-09-10 RX ADMIN — MYCOPHENOLIC ACID 540 MG: 360 TABLET, DELAYED RELEASE ORAL at 17:20

## 2019-09-10 RX ADMIN — TRAMADOL HYDROCHLORIDE 50 MG: 50 TABLET, COATED ORAL at 08:53

## 2019-09-10 RX ADMIN — ONDANSETRON 4 MG: 4 TABLET, ORALLY DISINTEGRATING ORAL at 19:54

## 2019-09-10 RX ADMIN — ALBUMIN HUMAN 12.5 G: 0.25 SOLUTION INTRAVENOUS at 05:01

## 2019-09-10 RX ADMIN — Medication 5000 UNITS: at 19:54

## 2019-09-10 RX ADMIN — MELATONIN 1000 UNITS: at 08:55

## 2019-09-10 RX ADMIN — ASPIRIN 325 MG ORAL TABLET 325 MG: 325 PILL ORAL at 08:55

## 2019-09-10 RX ADMIN — Medication 5000 UNITS: at 04:58

## 2019-09-10 RX ADMIN — ONDANSETRON 4 MG: 2 INJECTION INTRAMUSCULAR; INTRAVENOUS at 09:21

## 2019-09-10 RX ADMIN — Medication 1 PACKET: at 20:02

## 2019-09-10 RX ADMIN — MYCOPHENOLATE MOFETIL 1000 MG: 200 POWDER, FOR SUSPENSION ORAL at 08:52

## 2019-09-10 RX ADMIN — LEVOTHYROXINE SODIUM 125 MCG: 0.12 TABLET ORAL at 08:55

## 2019-09-10 RX ADMIN — CYCLOSPORINE 175 MG: 100 SOLUTION ORAL at 08:53

## 2019-09-10 RX ADMIN — CYCLOSPORINE 200 MG: 100 SOLUTION ORAL at 18:06

## 2019-09-10 RX ADMIN — MULTIVITAMIN 15 ML: LIQUID ORAL at 08:55

## 2019-09-10 ASSESSMENT — ACTIVITIES OF DAILY LIVING (ADL)
ADLS_ACUITY_SCORE: 22

## 2019-09-10 NOTE — PROGRESS NOTES
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     Graft function: ALK phos, AST and ALT stable. Tbili decreased to 0.9 from 1.3.     Immunosuppression management: Tac now stopped due to prolonged delirium, CSA started. MMF increased to 1000 mg BID during transition of CNI. Due to nausea and almost therapeutic CSA level will change to Myfortic 540 mg BID. Re-started pred 5mg due to transition between meds.  mg BID. 9/9 level 148 (12.5 hour trough). 9/10 level pending. Goal - 200-300.   Complexity of management: Medium. Contributing factors: anemia and induction , encephalopathy.     Hematology: Acute blood loss anemia. Transfusion goal hgb > 7. Hgb stable. Last transfused 3 units 8/20 and 1 unit 9/8.     HEENT: Post-op nasal bleeding due to friable mucosa, managed with packing, now resolved.     Cardiorespiratory: Patient extubated 8/24.   Hx of hypertension on spironolactone and bumex at home.     Neuro: Toxic and metabolic encephalopathy likely secondary to poor sleep, drugs (tac, pain medication), acute illness. Ordered melatonin and bedtime seroquel to help patient sleep, patient's mental status has seemed to be improving overall, but now seems fluctuant again. MRI head completed- results unremarkable. Discontinued Tacrolimus. Consulted neurology, recommend LP to rule out infectious etiology. Video EEG monitoring completed, no evidence of seizures.  9/5 LP, results thus far negative for infection. WBC 1. Protein 44 and glucose 51. Negative CSF studies: cryptococcal, enterovirus, HSV, VZV.    -VBG reviewed. Avoid medications that cause respiratory depression.   -Continue scheduled melatonin and Seroquel at bedtime.   -Continue bedside sitter due to pulling at tubes.   -work on day/night  cycles    GI/Nutrition: NAKIA. NJ in place. TF at goal. Now has bedside sitter due to confusion and pulling out NJs.     Endocrine: Steroid induced hyperglycemia, resolved    Fluid/Electrolytes: GAMAL- CRRT stopped and patient has required no further HD. Incontinent of urine, so difficult to quantify, but Cr now normalized. Cr 0.9. CO 35->38. K 5.1<-4.1. Consulted nephrology for recommendations due to alkalosis. Poor PO intake due to confusion. TF and free water restarted. Albumin 25 grams every 6 hours x48 hours.     : Incontinent    Infectious disease: AF, WBC WNL. Ppx with micafungin x 10 days completed, zosyn completed. PPx with dapsone and valcyte.    Prophylaxis: Heparin subcutaneous TID.     Activity: OOB to chair today. PT/OT.     Disposition: 7A     Medical Decision Making: Medium  Subsequent visit 99219 (moderate level decision making)    VEENA/Fellow/Resident Provider: Juliet Philippe PA-C     Faculty: Selena Howard MD    ____________________________________________________________  Transplant History: Admitted 8/16/2019 for acute decompensated ANGULO.   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    8/18/2019 (Liver), Postoperative day: 23     Interval History:   Confused this AM. Nausea limiting PO intake. No emesis.     ROS:   A 10-point review of systems was negative except as noted above.    Curent Meds:    aspirin  325 mg Oral Daily     carboxymethylcellulose PF  2 drop Both Eyes Q6H     clotrimazole  1 Molly Buccal 4x Daily     cycloSPORINE modified  175 mg Oral BID IS     dapsone  100 mg Oral Daily     famotidine  20 mg Oral Daily     heparin  5,000 Units Subcutaneous Q8H     levothyroxine  125 mcg Oral Daily     lidocaine (PF)  5 mL Subcutaneous Once     magnesium oxide  400 mg Oral Daily     melatonin  3 mg Oral At Bedtime     multivitamins w/minerals  15 mL Per Feeding Tube Daily     mycophenolic acid  540 mg Oral BID IS     protein modular  1 packet Per Feeding  Tube BID     sodium chloride  1 spray Both Nostrils Q4H     sodium chloride (PF)  3 mL Intravenous Q8H     sodium chloride (PF)  3 mL Intracatheter Q8H     valGANciclovir  450 mg Oral Daily    Or     valGANciclovir  450 mg Oral or NG Tube Daily     vitamin D3  1,000 Units Oral BID       Physical Exam:     Admit Weight: 104.3 kg (230 lb)    Current Vitals:   /75 (BP Location: Right arm)   Pulse 101   Temp 98.6  F (37  C) (Oral)   Resp 15   Wt 87.5 kg (192 lb 12.8 oz)   SpO2 94%   BMI 35.26 kg/m      Vital sign ranges:    Temp:  [97.5  F (36.4  C)-98.6  F (37  C)] 98.6  F (37  C)  Pulse:  [] 101  Heart Rate:  [] 100  Resp:  [15-18] 15  BP: (107-133)/(67-79) 115/75  SpO2:  [88 %-97 %] 94 %  Patient Vitals for the past 24 hrs:   BP Temp Temp src Pulse Heart Rate Resp SpO2 Weight   09/10/19 1139 -- -- -- -- -- -- 94 % --   09/10/19 1138 -- -- -- -- -- -- (!) 88 % --   09/10/19 1135 -- -- -- -- -- -- 94 % --   09/10/19 1103 115/75 -- -- -- 100 -- 93 % --   09/10/19 1100 115/75 98.6  F (37  C) Oral 101 -- 15 93 % --   09/10/19 1030 133/72 -- -- -- -- -- -- --   09/10/19 0900 -- -- -- -- -- -- (!) 88 % --   09/10/19 0700 111/67 98.2  F (36.8  C) Oral 98 -- 15 96 % --   09/10/19 0654 -- -- -- -- -- -- -- 87.5 kg (192 lb 12.8 oz)   09/10/19 0359 107/73 97.5  F (36.4  C) Oral -- 94 18 94 % --   09/09/19 2315 127/79 98.1  F (36.7  C) Oral -- 91 18 96 % --   09/09/19 1900 113/71 98.1  F (36.7  C) Oral 82 -- 16 96 % --   09/09/19 1500 107/69 97.5  F (36.4  C) Axillary -- 83 16 97 % --     General Appearance: NAD, confused  HEENT: Feeding tube in place  Skin: normal, warm, scattered bruising  Heart: RRR  Lungs: NLB on 1L  Abdomen: soft, nondistended, incision sutured, c/d/i.   : Wearing depends  Extremities: edema: present bilaterally. 2+. Wearing lymphedema wraps.  Neurologic: Confused, oriented to name only. No focal deficit.      Data:   CMP  Recent Labs   Lab 09/10/19  0630 09/09/19  0630    136    POTASSIUM 5.1 4.1   CHLORIDE 97 97   CO2 38* 35*   * 129*   BUN 27 22   CR 0.86 0.94   GFRESTIMATED 73 66   GFRESTBLACK 85 76   JACOB 8.8 8.4*   MAG 1.8 1.6   PHOS 3.1 3.2   ALBUMIN 2.7* 2.3*   BILITOTAL 1.7* 1.3   ALKPHOS 137 128   AST 39 28   ALT 29 24     CBC  Recent Labs   Lab 09/10/19  0630 09/09/19  0630   HGB 8.0* 7.6*   WBC 4.3 3.4*    172         COAGS  Recent Labs   Lab 09/04/19  1433   INR 1.11

## 2019-09-10 NOTE — PLAN OF CARE
/75 (BP Location: Right arm)   Pulse 101   Temp 98.6  F (37  C) (Oral)   Resp 15   Wt 87.5 kg (192 lb 12.8 oz)   SpO2 94%   BMI 35.26 kg/m       [id number]  Intermittent tacy; All other VSS on 1L of 02. Patient de-sat to 88% without oxygen. PRN tramadol given x1. PRN zofran given x1 this AM after morning medications. Regular diet; very poor appetite and PO intake. Continuous TF into NJ at goal rate of 50 ml/hr. Left OBED site continued to leak- team placed an extra stitch. Patient continues to be confused; orientated to self. Worked with PT and OT ; up with assist of two. Patient did very well pivoting and marching in place. Edema in legs has decreased as well. Sitter continued. Triple lumen CVC; saline locked; PIV saline locked. Continue plan of care; please notify MD with any changes.

## 2019-09-10 NOTE — PLAN OF CARE
Pt is oriented to self only, with choice was able to state she was in a hospital, but not which one or what city. Has confused, illogical conversations. Continues to have serous drainage from old OBED site. Dressing changed. NJ with TF at 50cc/hr. Pt pulling at tube at times. Sitter at bedside for pt safety. Gets up to commode to void with assist of 2. Will continue to monitor pt.

## 2019-09-10 NOTE — CONSULTS
Nephrology Initial Consult  September 10, 2019      Nicci Pemberton MRN:[id number] YOB: 1960  Date of Admission:8/16/2019  Primary care provider: Wale Thurston  Requesting physician: Mandeep Alford MD    ASSESSMENT AND RECOMMENDATIONS:   Nicci Pemberton is a 59 year old with history of ANGULO/alpha-anti trypsin cirrhosis now s/p liver tx on 8/18/2019 . She initially presented on 8/16 from TCU with decompensated ANGULO cirrhosis and had a liver transplant 2 days later. She now has elevated bicarb and the question is what type of acid-base disorder she has and this is difficult to determine without an ABG.     #. Possible mixed acid-base disorder  #. ?Metabolic alkalosis  #. ?Respiratory acidosis  Bicarb is 38.  VBG with pH 7.41. CO2 57. It is difficult to interpret acid-base status based off VBG. Ideally, an ABG would be more useful. At this time, it is difficult to say if she has a primay metabolic alkalosis with compensated respiratory acidosis vs primary respiratory acidosis with compensated metabolic alkalosis vs both primary metabolic alkalosis & primary respiratory acidosis. To help us differentiate this, would recommend an ABG. Additionally, hypothyroidism can also cause this type of acid-base disturbance, so can consider TSH with free T4 levels.   If this was a primary metabolic alkalosis, it is less likely from gastrointestinal losses given no NG tube output/vomiting/laxative use. She did receive bumex 1-2 days, so this could possibly be due to contraction alkalosis. She has normal potassium levels, no recent high dose steroid use so this is less likely from renin-aldosterone induced/mineralocorticoid activity induced.  Also, this could be due to barter/gitelman syndrome so would recommend urine Cl and urine K.   - Recommend ABG  - Recommend urine studied (urine chloride and urine potassium) [ordered already]  - Continue to trend BMP   - Will assess tomorrow what type of acid-base  disorder she has    #. Electrolytes  K, Na, Ph, Ca wnl   - monitor    #. Volume status  Appears hypervolemic based on LE edema.   - will comment on diuresis if indicated tomorrow     Recommendations were communicated to primary team via note.     Seen and discussed with Dr. Ramirez.     Yung Aranda  PGY-2,   P: [phone]    REASON FOR CONSULT: metabolic alkalosis management    HISTORY OF PRESENT ILLNESS:  Admitting provider and nursing notes reviewed  Nicci Pemberton is a 59 year old with history of ANGULO/alpha-anti trypsin cirrhosis now s/p liver tx on 2019 . She initially presented on  from TCU with decompensated ANGULO cirrhosis and had a liver transplant 2 days later.     Post transplant has been complicated by encephalopathy with the leading diagnosis being toxic-metabolic etiology.     She is unable to provide any history today, information is from chart review.     Reviewing her labs, her bicarb has been elevated since  and her VBG shows CO2 elevation in the mid-50's range.     PAST MEDICAL HISTORY:  Past Medical History:   Diagnosis Date     Anemia      Cellulitis      Cirrhosis of liver (H) 2018     Heterozygous alpha 1-antitrypsin deficiency (H)      High hepatic iron concentration determined by biopsy of liver      Liver cirrhosis secondary to ANGULO (H)      Lymphedema      Osteoarthritis      SBP (spontaneous bacterial peritonitis) (H) 2019 in [facility]       Past Surgical History:   Procedure Laterality Date     BENCH LIVER N/A 2019    Procedure: BACKBENCH PREPARATION, LIVER;  Surgeon: Mandeep Alford MD;  Location: [facility] OR     COLONOSCOPY N/A 2018    Procedure: COLONOSCOPY;  colonoscopy;  Surgeon: Hugo Patel MD;  Location: [facility] OR     IR FEEDING TUBE PLACEMENT W MOHAN/MD  2019     IR FLUORO 0-1 HOUR  2019     IR LUMBAR PUNCTURE  2019     TRANSPLANT LIVER RECIPIENT  DONOR N/A 2019    Procedure: TRANSPLANT, LIVER, RECIPIENT,   DONOR;  Surgeon: Mandeep Alford MD;  Location: [facility] OR        MEDICATIONS:  PTA Meds  Prior to Admission medications    Medication Sig Last Dose Taking? Auth Provider   bumetanide (BUMEX) 1 MG tablet Take 1 tablet (1 mg) by mouth daily 8/16/2019 at Unknown time Yes Leventhal, Thomas Michael, MD   Cholecalciferol (VITAMIN D-3) 1000 units CAPS Take 1,000 Units by mouth 2 times daily 8/16/2019x1 at Unknown time Yes Reported, Patient   famotidine (PEPCID) 20 MG tablet Take 20 mg by mouth 2 times daily  8/16/2019x1 at Unknown time Yes Reported, Patient   lactulose 20 GM/30ML SOLN Take 30 mLs by mouth 3 times daily 8/16/2019x1 at Unknown time Yes Unknown, Entered By History   levothyroxine (SYNTHROID/LEVOTHROID) 125 MCG tablet Take 125 mcg by mouth daily 8/16/2019 at Unknown time Yes Unknown, Entered By History   magnesium oxide (MAG-OX) 400 MG tablet Take 400 mg by mouth daily  8/16/2019 at Unknown time Yes Reported, Patient   oxyCODONE HCl (OXAYDO) 5 MG TABA Take 5 mg by mouth every 8 hours as needed Past Month at Unknown time Yes Unknown, Entered By History   rifaximin (XIFAXAN) 550 MG TABS tablet Take 1 tablet (550 mg) by mouth 2 times daily 8/16/2019x1 at Unknown time Yes Leventhal, Thomas Michael, MD   spironolactone (ALDACTONE) 100 MG tablet Take 100 mg by mouth 2 times daily 8/16/2019x1 at Unknown time Yes Unknown, Entered By History      Current Meds    aspirin  325 mg Oral Daily     carboxymethylcellulose PF  2 drop Both Eyes Q6H     clotrimazole  1 Molly Buccal 4x Daily     cycloSPORINE modified  175 mg Oral BID IS     dapsone  100 mg Oral Daily     famotidine  20 mg Oral Daily     heparin  5,000 Units Subcutaneous Q8H     levothyroxine  125 mcg Oral Daily     magnesium oxide  400 mg Oral Daily     melatonin  3 mg Oral At Bedtime     multivitamins w/minerals  15 mL Per Feeding Tube Daily     mycophenolic acid  540 mg Oral BID IS     protein modular  1 packet Per Feeding Tube BID     sodium chloride  1  spray Both Nostrils Q4H     sodium chloride (PF)  3 mL Intravenous Q8H     sodium chloride (PF)  3 mL Intracatheter Q8H     valGANciclovir  450 mg Oral Daily    Or     valGANciclovir  450 mg Oral or NG Tube Daily     vitamin D3  1,000 Units Oral BID     Infusion Meds    IV fluid REPLACEMENT ONLY       IV fluid REPLACEMENT ONLY Stopped ([id number])     - MEDICATION INSTRUCTIONS -         ALLERGIES:    Allergies   Allergen Reactions     Food      Fruits with cores and pits  Apples     Sulfa Drugs Unknown     Swollen joints     Furosemide Rash       REVIEW OF SYSTEMS:  A comprehensive of systems was negative except as noted above.    SOCIAL HISTORY:   Social History     Socioeconomic History     Marital status:      Spouse name: Not on file     Number of children: Not on file     Years of education: Not on file     Highest education level: Not on file   Occupational History     Not on file   Social Needs     Financial resource strain: Not on file     Food insecurity:     Worry: Not on file     Inability: Not on file     Transportation needs:     Medical: Not on file     Non-medical: Not on file   Tobacco Use     Smoking status: Former Smoker     Last attempt to quit:      Years since quittin.6     Smokeless tobacco: Never Used     Tobacco comment: weekend smoker    Substance and Sexual Activity     Alcohol use: No     Drug use: No     Sexual activity: Not on file   Lifestyle     Physical activity:     Days per week: Not on file     Minutes per session: Not on file     Stress: Not on file   Relationships     Social connections:     Talks on phone: Not on file     Gets together: Not on file     Attends [employer] service: Not on file     Active member of club or organization: Not on file     Attends meetings of clubs or organizations: Not on file     Relationship status: Not on file     Intimate partner violence:     Fear of current or ex partner: Not on file     Emotionally abused: Not on file      Physically abused: Not on file     Forced sexual activity: Not on file   Other Topics Concern     Parent/sibling w/ CABG, MI or angioplasty before 65F 55M? Not Asked   Social History Narrative     Not on file       FAMILY MEDICAL HISTORY:   Family History   Problem Relation Age of Onset     Cirrhosis No family hx of      Liver Cancer No family hx of          PHYSICAL EXAM:   Temp  Av.9  F (36.6  C)  Min: 95.7  F (35.4  C)  Max: 99.9  F (37.7  C)  Arterial Line BP  Min: 81/54  Max: 138/67  Arterial Line MAP (mmHg)  Av.2 mmHg  Min: 64 mmHg  Max: 91 mmHg      Pulse  Av.5  Min: 71  Max: 106 Resp  Av.2  Min: 8  Max: 32  FiO2 (%)  Av.3 %  Min: 4 %  Max: 100 %  SpO2  Av.6 %  Min: 80 %  Max: 100 %    CVP (mmHg): 16 mmHg  /75 (BP Location: Right arm)   Pulse 101   Temp 98.6  F (37  C) (Oral)   Resp 15   Wt 87.5 kg (192 lb 12.8 oz)   SpO2 94%   BMI 35.26 kg/m     Date 09/10/19 07 - 19 0659   Shift 6778-3766 2116-6843 8171-3773 24 Hour Total   INTAKE   P.O. 400   400   Enteral 400   400   Shift Total(mL/kg) 800(9.15)   800(9.15)   OUTPUT   Shift Total(mL/kg)       Weight (kg) 87.45 87.45 87.45 87.45      Admit Weight: 104.3 kg (230 lb)     GENERAL APPEARANCE: Somnolent, lethargic  EYES: no scleral icterus, pupils equal  Pulmonary: bibasilar crackles otherwise clear  CV: regular rhythm, normal rate, no rub   - JVD not appreciated   - Edema possible (unable to assess due to ACE wraps)  GI: soft, nontender, normal bowel sounds  : no durbin  SKIN: no rash, warm, dry, no cyanosis  NEURO: somnolent, unable to stay awake to answer questions    LABS:   CMP  Recent Labs   Lab 09/10/19  0630 19  0630 19  0559 19  0538    136 137 137   POTASSIUM 5.1 4.1 4.5 4.2   CHLORIDE 97 97 99 99   CO2 38* 35* 33* 33*   ANIONGAP 5 4 6 5   * 129* 148* 120*   BUN 27 22 21 20   CR 0.86 0.94 0.81 1.07*   GFRESTIMATED 73 66 79 57*   GFRESTBLACK 85 76 >90 66   JACOB 8.8 8.4*  8.3* 8.2*   MAG 1.8 1.6 2.0 2.1   PHOS 3.1 3.2 3.0 4.1   PROTTOTAL 5.4* 5.1* 5.0* 4.6*   ALBUMIN 2.7* 2.3* 1.9* 1.7*   BILITOTAL 1.7* 1.3 1.4* 1.4*   ALKPHOS 137 128 129 129   AST 39 28 25 27   ALT 29 24 24 23     CBC  Recent Labs   Lab 09/10/19  0630 09/09/19  0630 09/08/19  0559 09/07/19  0538   HGB 8.0* 7.6* 7.1* 7.0*   WBC 4.3 3.4* 3.1* 2.4*   RBC 2.72* 2.55* 2.36* 2.27*   HCT 27.5* 25.3* 23.9* 23.1*   * 99 101* 102*   MCH 29.4 29.8 30.1 30.8   MCHC 29.1* 30.0* 29.7* 30.3*   RDW 18.7* 19.4* 19.9* 20.5*    172 174 170     INR  Recent Labs   Lab 09/04/19  1433   INR 1.11     ABG  Recent Labs   Lab 09/08/19  0559 09/07/19  2356 09/07/19  1742 09/06/19  1608   O2PER 30% 1LITER 1L 2LPM      URINE STUDIES  Recent Labs   Lab Test 08/24/19  1555 08/16/19  1755 06/18/18  1019   COLOR Dark Brown Dark Yellow Yellow   APPEARANCE Cloudy Clear Clear   URINEGLC Negative Negative Negative   URINEBILI Small* Moderate* Negative   URINEKETONE Negative Negative Negative   SG 1.020 1.007 1.014   UBLD Moderate* Negative Negative   URINEPH 5.5 5.5 8.0*   PROTEIN 100* Negative Negative   NITRITE Negative Negative Negative   LEUKEST Moderate* Negative Small*   RBCU 43* 0 1   WBCU 36* 1 8*     Recent Labs   Lab Test 06/18/18  1019   UTPG Unable to calculate due to low value     PTH  Recent Labs   Lab Test 06/18/18  1024   PTHI 14*     IRON STUDIES  Recent Labs   Lab Test 02/19/19  0825 06/18/18  1024   IRON 188* 220*   * 244   IRONSAT 95* 90*   DREW 825* 996*       IMAGING:  Reviewed

## 2019-09-10 NOTE — PROGRESS NOTES
Drain Stitch Placement    Patient with continue leakage from prior drain site, despite previous stitch. The area was prepped, and 4 ml of 1% lidocaine was infiltrated subcutaneously for analgesia. A single U stitch was placed using a 2-0 nylon suture with adequate closure of drain site and no leakage. Dressing placed over site.       Shari Pruett  General Surgery PGY-4  Transplant Surgery

## 2019-09-10 NOTE — PLAN OF CARE
Discharge Planner PT  7A  Patient plan for discharge: Not stated  Current status: Pt continues to demonstrate confusion and impaired command following which limits progression of mobility. Pt requires modA for supine>sit, minAx2 and FWW for sit<>stand x2 and transfer to recliner. Standing pre-gait activities performed with mod-minAx2 and FWW with pt tolerating standing up to 2 minutes at a time. Pt with reports of headache that limits further activity.  Barriers to return to prior living situation: Medical status, impaired cognition, abdominal precautions, weakness, decreased activity tolerance, fall risk  Recommendations for discharge: TCU  Rationale for recommendations: Pt would benefit from continued skilled therapy services to progress strength, endurance, balance, and safety and independence with functional mobility.       Entered by: Naida Khalil 09/10/2019 11:05 AM

## 2019-09-10 NOTE — PLAN OF CARE
OT/7A: Discharge Planner OT   Patient plan for discharge: Not discussed  Current status: A&Ox1 (self). Required verbal/tactile cues, Min A x2, and FWW sit<>stand, and step towards bed from BSC Mod A x2 required to tx EOB>supine. Pt demonstrates difficulty following 1-step commands with tactile cues. Required mod A to choose appropriate materials and wash face seated in chair. Educated pt on abdominal precautions, requiring max A to recall after instruction. VSS on RA.  Barriers to return to prior living situation: cognition, activity tolerance, assist level, weakness, abdominal precautions, fall risk  Recommendations for discharge: TCU  Rationale for recommendations: Pt would benefit from further skilled OT to progress activity tolerance and cognition to improve ADL/IADL participation.       Entered by: Jasmyne River 09/10/2019 3:57 PM

## 2019-09-10 NOTE — PLAN OF CARE
Patient's VSS, afebrile, alert and oriented X 1-2 when awake. complaining of a headache off and on. Patient very sleepy this shift, will wake for a little bit then go back to sleep. Patient up to the chair for half the shift, otherwise resting in bed. Patient used the commode with assist of 1. Attendant at the bedside throughout the shift. Patient did not want to eat anything for dinner, continues with continuous tube feeding through an NJ at 50cc/hour. Patient's incisoin C/D/I. Old OBED on the left removed this weekend but continues to leak; dressing changed as needed. Continue to monitor, encourage patient to be awake and OOB, reorient as needed.

## 2019-09-11 ENCOUNTER — APPOINTMENT (OUTPATIENT)
Dept: OCCUPATIONAL THERAPY | Facility: CLINIC | Age: 59
DRG: 005 | End: 2019-09-11
Payer: COMMERCIAL

## 2019-09-11 ENCOUNTER — APPOINTMENT (OUTPATIENT)
Dept: PHYSICAL THERAPY | Facility: CLINIC | Age: 59
DRG: 005 | End: 2019-09-11
Payer: COMMERCIAL

## 2019-09-11 LAB
ALBUMIN SERPL-MCNC: 2.4 G/DL (ref 3.4–5)
ALP SERPL-CCNC: 156 U/L (ref 40–150)
ALT SERPL W P-5'-P-CCNC: 31 U/L (ref 0–50)
ANION GAP SERPL CALCULATED.3IONS-SCNC: 5 MMOL/L (ref 3–14)
ANISOCYTOSIS BLD QL SMEAR: SLIGHT
AST SERPL W P-5'-P-CCNC: 38 U/L (ref 0–45)
BASE EXCESS BLDA CALC-SCNC: 12.1 MMOL/L
BASOPHILS # BLD AUTO: 0 10E9/L (ref 0–0.2)
BASOPHILS NFR BLD AUTO: 0 %
BILIRUB DIRECT SERPL-MCNC: 0.9 MG/DL (ref 0–0.2)
BILIRUB SERPL-MCNC: 1.3 MG/DL (ref 0.2–1.3)
BUN SERPL-MCNC: 27 MG/DL (ref 7–30)
CALCIUM SERPL-MCNC: 8.8 MG/DL (ref 8.5–10.1)
CHLORIDE SERPL-SCNC: 95 MMOL/L (ref 94–109)
CO2 SERPL-SCNC: 36 MMOL/L (ref 20–32)
CREAT SERPL-MCNC: 0.83 MG/DL (ref 0.52–1.04)
CYCLOSPORINE BLD LC/MS/MS-MCNC: 183 UG/L (ref 50–400)
DIFFERENTIAL METHOD BLD: ABNORMAL
EOSINOPHIL # BLD AUTO: 0 10E9/L (ref 0–0.7)
EOSINOPHIL NFR BLD AUTO: 0.9 %
ERYTHROCYTE [DISTWIDTH] IN BLOOD BY AUTOMATED COUNT: 18.7 % (ref 10–15)
GFR SERPL CREATININE-BSD FRML MDRD: 77 ML/MIN/{1.73_M2}
GLUCOSE BLDC GLUCOMTR-MCNC: 128 MG/DL (ref 70–99)
GLUCOSE BLDC GLUCOMTR-MCNC: 139 MG/DL (ref 70–99)
GLUCOSE SERPL-MCNC: 126 MG/DL (ref 70–99)
HCO3 BLD-SCNC: 37 MMOL/L (ref 21–28)
HCT VFR BLD AUTO: 27.8 % (ref 35–47)
HGB BLD-MCNC: 8.4 G/DL (ref 11.7–15.7)
LYMPHOCYTES # BLD AUTO: 0.1 10E9/L (ref 0.8–5.3)
LYMPHOCYTES NFR BLD AUTO: 2.7 %
MAGNESIUM SERPL-MCNC: 1.8 MG/DL (ref 1.6–2.3)
MCH RBC QN AUTO: 30.5 PG (ref 26.5–33)
MCHC RBC AUTO-ENTMCNC: 30.2 G/DL (ref 31.5–36.5)
MCV RBC AUTO: 101 FL (ref 78–100)
METAMYELOCYTES # BLD: 0 10E9/L
METAMYELOCYTES NFR BLD MANUAL: 0.9 %
MONOCYTES # BLD AUTO: 0.3 10E9/L (ref 0–1.3)
MONOCYTES NFR BLD AUTO: 6.3 %
MYELOCYTES # BLD: 0.1 10E9/L
MYELOCYTES NFR BLD MANUAL: 2.7 %
NEUTROPHILS # BLD AUTO: 4.6 10E9/L (ref 1.6–8.3)
NEUTROPHILS NFR BLD AUTO: 86.5 %
NRBC # BLD AUTO: 0 10*3/UL
NRBC BLD AUTO-RTO: 1 /100
O2/TOTAL GAS SETTING VFR VENT: 21 %
PCO2 BLD: 53 MM HG (ref 35–45)
PH BLD: 7.46 PH (ref 7.35–7.45)
PHOSPHATE SERPL-MCNC: 2.8 MG/DL (ref 2.5–4.5)
PLATELET # BLD AUTO: 179 10E9/L (ref 150–450)
PLATELET # BLD EST: ABNORMAL 10*3/UL
PO2 BLD: 58 MM HG (ref 80–105)
POTASSIUM SERPL-SCNC: 5.2 MMOL/L (ref 3.4–5.3)
PROT SERPL-MCNC: 5.5 G/DL (ref 6.8–8.8)
RBC # BLD AUTO: 2.75 10E12/L (ref 3.8–5.2)
SODIUM SERPL-SCNC: 137 MMOL/L (ref 133–144)
SPECIMEN SOURCE: NORMAL
T GONDII DNA SPEC QL NAA+PROBE: NOT DETECTED
T4 FREE SERPL-MCNC: 0.7 NG/DL (ref 0.76–1.46)
TME LAST DOSE: NORMAL H
TSH SERPL DL<=0.005 MIU/L-ACNC: 17.1 MU/L (ref 0.4–4)
WBC # BLD AUTO: 5.3 10E9/L (ref 4–11)

## 2019-09-11 PROCEDURE — 25000132 ZZH RX MED GY IP 250 OP 250 PS 637: Performed by: PHYSICIAN ASSISTANT

## 2019-09-11 PROCEDURE — 80158 DRUG ASSAY CYCLOSPORINE: CPT | Performed by: PHYSICIAN ASSISTANT

## 2019-09-11 PROCEDURE — 25000128 H RX IP 250 OP 636: Performed by: PHYSICIAN ASSISTANT

## 2019-09-11 PROCEDURE — 97535 SELF CARE MNGMENT TRAINING: CPT | Mod: GO | Performed by: OCCUPATIONAL THERAPIST

## 2019-09-11 PROCEDURE — 36415 COLL VENOUS BLD VENIPUNCTURE: CPT | Performed by: TRANSPLANT SURGERY

## 2019-09-11 PROCEDURE — 82248 BILIRUBIN DIRECT: CPT | Performed by: TRANSPLANT SURGERY

## 2019-09-11 PROCEDURE — 36600 WITHDRAWAL OF ARTERIAL BLOOD: CPT

## 2019-09-11 PROCEDURE — 25000132 ZZH RX MED GY IP 250 OP 250 PS 637: Performed by: STUDENT IN AN ORGANIZED HEALTH CARE EDUCATION/TRAINING PROGRAM

## 2019-09-11 PROCEDURE — 84439 ASSAY OF FREE THYROXINE: CPT | Performed by: TRANSPLANT SURGERY

## 2019-09-11 PROCEDURE — 25000132 ZZH RX MED GY IP 250 OP 250 PS 637: Performed by: TRANSPLANT SURGERY

## 2019-09-11 PROCEDURE — 80053 COMPREHEN METABOLIC PANEL: CPT | Performed by: TRANSPLANT SURGERY

## 2019-09-11 PROCEDURE — 25000125 ZZHC RX 250: Performed by: PHYSICIAN ASSISTANT

## 2019-09-11 PROCEDURE — 82803 BLOOD GASES ANY COMBINATION: CPT | Performed by: PHYSICIAN ASSISTANT

## 2019-09-11 PROCEDURE — 25000132 ZZH RX MED GY IP 250 OP 250 PS 637: Performed by: SURGERY

## 2019-09-11 PROCEDURE — 97116 GAIT TRAINING THERAPY: CPT | Mod: GP

## 2019-09-11 PROCEDURE — 00000146 ZZHCL STATISTIC GLUCOSE BY METER IP

## 2019-09-11 PROCEDURE — 25000131 ZZH RX MED GY IP 250 OP 636 PS 637: Performed by: PHYSICIAN ASSISTANT

## 2019-09-11 PROCEDURE — 12000026 ZZH R&B TRANSPLANT

## 2019-09-11 PROCEDURE — 97530 THERAPEUTIC ACTIVITIES: CPT | Mod: GP

## 2019-09-11 PROCEDURE — 85025 COMPLETE CBC W/AUTO DIFF WBC: CPT | Performed by: TRANSPLANT SURGERY

## 2019-09-11 PROCEDURE — 84100 ASSAY OF PHOSPHORUS: CPT | Performed by: TRANSPLANT SURGERY

## 2019-09-11 PROCEDURE — 84443 ASSAY THYROID STIM HORMONE: CPT | Performed by: TRANSPLANT SURGERY

## 2019-09-11 PROCEDURE — 40000275 ZZH STATISTIC RCP TIME EA 10 MIN

## 2019-09-11 PROCEDURE — 83735 ASSAY OF MAGNESIUM: CPT | Performed by: TRANSPLANT SURGERY

## 2019-09-11 RX ORDER — IBUPROFEN 400 MG/1
400 TABLET, FILM COATED ORAL EVERY 4 HOURS PRN
Status: DISCONTINUED | OUTPATIENT
Start: 2019-09-11 | End: 2019-09-14

## 2019-09-11 RX ORDER — LEVOTHYROXINE SODIUM 175 UG/1
175 TABLET ORAL DAILY
Status: DISCONTINUED | OUTPATIENT
Start: 2019-09-12 | End: 2019-09-23 | Stop reason: HOSPADM

## 2019-09-11 RX ADMIN — Medication 1 PACKET: at 20:26

## 2019-09-11 RX ADMIN — MELATONIN 1000 UNITS: at 08:32

## 2019-09-11 RX ADMIN — MULTIVITAMIN 15 ML: LIQUID ORAL at 08:33

## 2019-09-11 RX ADMIN — Medication 5000 UNITS: at 20:28

## 2019-09-11 RX ADMIN — MELATONIN 1000 UNITS: at 20:26

## 2019-09-11 RX ADMIN — DAPSONE 100 MG: 100 TABLET ORAL at 08:33

## 2019-09-11 RX ADMIN — MELATONIN TAB 3 MG 3 MG: 3 TAB at 22:05

## 2019-09-11 RX ADMIN — CYCLOSPORINE 200 MG: 100 SOLUTION ORAL at 18:33

## 2019-09-11 RX ADMIN — Medication 5000 UNITS: at 04:23

## 2019-09-11 RX ADMIN — FAMOTIDINE 20 MG: 20 TABLET ORAL at 08:33

## 2019-09-11 RX ADMIN — Medication 5000 UNITS: at 11:35

## 2019-09-11 RX ADMIN — MYCOPHENOLIC ACID 540 MG: 360 TABLET, DELAYED RELEASE ORAL at 08:33

## 2019-09-11 RX ADMIN — LEVOTHYROXINE SODIUM 125 MCG: 0.12 TABLET ORAL at 08:33

## 2019-09-11 RX ADMIN — ONDANSETRON 4 MG: 2 INJECTION INTRAMUSCULAR; INTRAVENOUS at 08:48

## 2019-09-11 RX ADMIN — CLOTRIMAZOLE 1 TROCHE: 10 LOZENGE ORAL at 15:50

## 2019-09-11 RX ADMIN — MAGNESIUM OXIDE TAB 400 MG (241.3 MG ELEMENTAL MG) 400 MG: 400 (241.3 MG) TAB at 09:35

## 2019-09-11 RX ADMIN — IBUPROFEN 400 MG: 400 TABLET ORAL at 20:27

## 2019-09-11 RX ADMIN — MYCOPHENOLIC ACID 540 MG: 360 TABLET, DELAYED RELEASE ORAL at 18:33

## 2019-09-11 RX ADMIN — CYCLOSPORINE 200 MG: 100 SOLUTION ORAL at 08:32

## 2019-09-11 RX ADMIN — Medication 1 PACKET: at 08:35

## 2019-09-11 RX ADMIN — ASPIRIN 325 MG ORAL TABLET 325 MG: 325 PILL ORAL at 08:32

## 2019-09-11 RX ADMIN — VALGANCICLOVIR HYDROCHLORIDE 450 MG: 50 POWDER, FOR SOLUTION ORAL at 08:33

## 2019-09-11 ASSESSMENT — ACTIVITIES OF DAILY LIVING (ADL)
ADLS_ACUITY_SCORE: 24
ADLS_ACUITY_SCORE: 22
ADLS_ACUITY_SCORE: 22
ADLS_ACUITY_SCORE: 14
ADLS_ACUITY_SCORE: 24
ADLS_ACUITY_SCORE: 22

## 2019-09-11 NOTE — PROGRESS NOTES
Transplant Surgery  Inpatient Daily Progress Note  09/11/2019    Assessment & Plan: Nicci Pemberton is a 58 yo female with a past medical history significant for end stage liver disease 2/2 ANGULO and alpha 1 anti-trypsin deficiency now s/p DD OLT on 8/18/19.     Graft function: ALK phos, AST and ALT stable. Tbili 1.3<-1.7<-1.3. Will monitor closely.     Immunosuppression management: Tac now stopped due to prolonged delirium, CSA started. MMF increased to 1000 mg BID during transition of CNI. Due to nausea and almost therapeutic CSA level changed to Myfortic 540 mg BID. Re-started pred 5mg due to transition between meds, now discontinued.  mg BID. 9/11 level 183 (12 hour trough). Goal 200-300.   Complexity of management: Medium. Contributing factors: anemia and induction , encephalopathy.     Hematology: Acute blood loss anemia. Transfusion goal hgb > 7. Hgb stable. Last transfused 3 units 8/20 and 1 unit 9/8.     HEENT: Post-op nasal bleeding due to friable mucosa, managed with packing, now resolved.     Cardiorespiratory: Patient extubated 8/24. Stable on 1 L oxygen. Encourage IS and ambulation.   Hx of hypertension on spironolactone and bumex at home.     Neuro: Toxic and metabolic encephalopathy likely secondary to poor sleep, drugs (tac, pain medication), acute illness. Ordered melatonin and bedtime seroquel to help patient sleep, patient's mental status has seemed to be improving overall, but fluctuates. MRI head completed- results unremarkable. Discontinued Tacrolimus. Consulted neurology, recommended LP to rule out infectious etiology. Video EEG monitoring completed, no evidence of seizures.  9/5 LP, results thus far negative for infection. WBC 1. Protein 44 and glucose 51. Negative CSF studies: cryptococcal, enterovirus, HSV, VZV.    -VBG reviewed. Avoid medications that cause respiratory depression.   -Continue scheduled melatonin and Seroquel at bedtime.   -Continue bedside sitter due to pulling at  tubes.   -work on day/night cycles    GI/Nutrition: NAKIA. NJ in place. TF at goal. Now has bedside sitter due to confusion and pulling out NJs.     Endocrine: Steroid induced hyperglycemia, resolved    Fluid/Electrolytes: GAMAL- CRRT stopped and patient has required no further HD. Incontinent of urine, so difficult to quantify, but Cr now normalized. Cr 0.8. CO 35->38->36. K 5.2. Likely due to mixed acid-base disorder. Consulted nephrology for recommendations due to alkalosis. TSH 17, T4 free 0.7. Chloride urine 12, sodium urine 17, K urine 30. BG pending. Will discuss with nephrology today. Poor PO intake due to confusion. TF and free water restarted.     : Incontinent    Infectious disease: AF, WBC WNL. Ppx with micafungin x 10 days completed, zosyn completed. PPx with dapsone and valcyte.    Prophylaxis: Heparin subcutaneous TID.     Activity: OOB to chair today. PT/OT.     Disposition: 7A     Medical Decision Making: Medium  Subsequent visit 29147 (moderate level decision making)    VEENA/Fellow/Resident Provider: Juliet Philippe PA-C     Faculty: Selena Howard MD    ____________________________________________________________  Transplant History: Admitted 8/16/2019 for acute decompensated ANGULO.   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    8/18/2019 (Liver), Postoperative day: 24     Interval History:   Confused this AM. Nausea limiting PO intake. No emesis.     ROS:   A 10-point review of systems was negative except as noted above.    Curent Meds:    aspirin  325 mg Oral Daily     carboxymethylcellulose PF  2 drop Both Eyes Q6H     clotrimazole  1 Molly Buccal 4x Daily     cycloSPORINE modified  200 mg Oral BID IS     dapsone  100 mg Oral Daily     famotidine  20 mg Oral Daily     heparin  5,000 Units Subcutaneous Q8H     levothyroxine  125 mcg Oral Daily     magnesium oxide  400 mg Oral Daily     melatonin  3 mg Oral At Bedtime     multivitamins w/minerals  15 mL Per  Feeding Tube Daily     mycophenolic acid  540 mg Oral BID IS     protein modular  1 packet Per Feeding Tube BID     sodium chloride  1 spray Both Nostrils Q4H     sodium chloride (PF)  3 mL Intravenous Q8H     sodium chloride (PF)  3 mL Intracatheter Q8H     valGANciclovir  450 mg Oral Daily    Or     valGANciclovir  450 mg Oral or NG Tube Daily     vitamin D3  1,000 Units Oral BID       Physical Exam:     Admit Weight: 104.3 kg (230 lb)    Current Vitals:   /67 (BP Location: Right arm)   Pulse 99   Temp 98.7  F (37.1  C) (Oral)   Resp 16   Wt 88 kg (194 lb)   SpO2 95%   BMI 35.48 kg/m      Vital sign ranges:    Temp:  [97.4  F (36.3  C)-98.7  F (37.1  C)] 98.7  F (37.1  C)  Pulse:  [] 99  Heart Rate:  [] 98  Resp:  [14-18] 16  BP: (104-121)/(65-75) 104/67  SpO2:  [88 %-96 %] 95 %  Patient Vitals for the past 24 hrs:   BP Temp Temp src Pulse Heart Rate Resp SpO2 Weight   09/11/19 0750 104/67 98.7  F (37.1  C) Oral 99 -- 16 95 % --   09/11/19 0349 109/69 97.7  F (36.5  C) Oral -- 98 16 95 % --   09/11/19 0239 -- -- -- -- -- -- -- 88 kg (194 lb)   09/10/19 2320 121/65 98.1  F (36.7  C) Axillary -- 92 18 96 % --   09/10/19 1942 110/72 97.4  F (36.3  C) Axillary -- 93 14 94 % --   09/10/19 1522 108/68 98.3  F (36.8  C) Axillary -- 92 14 95 % --   09/10/19 1432 105/70 -- -- -- -- -- 93 % --   09/10/19 1139 -- -- -- -- -- -- 94 % --   09/10/19 1138 -- -- -- -- -- -- (!) 88 % --   09/10/19 1135 -- -- -- -- -- -- 94 % --   09/10/19 1103 115/75 -- -- -- 100 -- 93 % --   09/10/19 1100 115/75 98.6  F (37  C) Oral 101 -- 15 93 % --     General Appearance: NAD, confused  HEENT: Feeding tube in place  Skin: normal, warm, scattered bruising  Heart: RRR  Lungs: NLB on 1L  Abdomen: soft, nondistended, incision sutured, c/d/i.   : Wearing depends  Extremities: edema: present bilaterally. 2+. Wearing lymphedema wraps.  Neurologic: Confused, oriented to name only. No focal deficit.      Data:   CMP  Recent  Labs   Lab 09/11/19  0614 09/10/19  0630    139   POTASSIUM 5.2 5.1   CHLORIDE 95 97   CO2 36* 38*   * 130*   BUN 27 27   CR 0.83 0.86   GFRESTIMATED 77 73   GFRESTBLACK 90 85   JACOB 8.8 8.8   MAG 1.8 1.8   PHOS 2.8 3.1   ALBUMIN 2.4* 2.7*   BILITOTAL 1.3 1.7*   ALKPHOS 156* 137   AST 38 39   ALT 31 29     CBC  Recent Labs   Lab 09/11/19  0614 09/10/19  0630   HGB 8.4* 8.0*   WBC 5.3 4.3    174         COAGS  Recent Labs   Lab 09/04/19  1433   INR 1.11

## 2019-09-11 NOTE — PLAN OF CARE
Disoriented to time and situation; easily reoriented. Bedside attendant present. AVSS on 1L O2 per NC. Denies pain and nausea. Tolerating regular diet with poor appetite. NJ with continuous TF at 50 mL/hr.  and 139. PIV SL'd. TL I.J SL'd. ABG's completed per RT (see VS flowsheet). Unable to fully assess UOP d/t intermittent incontinence / mix with stool. BM x2 today. Up with Ax2. Will continue with plan of care.

## 2019-09-11 NOTE — PROGRESS NOTES
CLINICAL NUTRITION SERVICES - REASSESSMENT NOTE     Nutrition Prescription    Malnutrition Status:    Severe malnutrition in the context of acute on chronic illness    Recommendations already ordered by Registered Dietitian (RD):  Adjusted oral supplements to Boost Breeze supplements BID between meals    Future/Additional Recommendations:  If K+ becomes elevated, recommend switching to Nepro @ goal 45 ml/hr (1080 ml/day) + 1 pkt Prosource TF to provide 1994 kcals (33 kcal/kg/day), 98 g PRO (1.6 g/kg/day), 788 ml free H2O, 174 g CHO and 14 g Fiber daily.    Consider Nutrisource Fiber to help bulk/slow bowel movements.     Re-order calorie counts if patient's mental status/PO intake seems to improve.        EVALUATION OF THE PROGRESS TOWARD GOALS   Diet: Regular + supplements (Boost Breeze 1x/day + Boost Plus 2x/day) between meals    TF: Nutren 1.5 @ 50 ml/hr via NDT (replaced 9/6) - FT bridle     Intake: Over the last 7 days, patient has received an average of 1236 kcal (69% needs) and 69 grams protein (77% needs) from TF.  TF stopped as FT accidentally dislodged (TF stopped on 9/5 @ 0400).  Replacement was delayed d/t the need for sedation, TF was resumed at 9/6 @ 1700.  Unfortunately, it was ramped up slowly again to goal. Patient received similar amounts of nutrition during the previous week.      PO intake: Limited by nausea and very poor appetite.  Occassionally takes sips of Boost and takes pills in applesauce.   feels likes she likes the Boost Breeze supplement best.       NEW FINDINGS   GI: 1-2 BM daily recorded    Labs: K+ 5.2 today, has been on higher end of normal range    Meds: MVI with minerals, Mag-Ox 400 mg daily, Myfortic 540 mg BID, Cyclosporine 200 mg BID, Vitamin D 1000 international unit(s) BID    Weight: Significant weight loss since admission.  Current dry weight is 87.5 kg (overall down 16.8 kg/16% in ~3.5 weeks).  Patient had severe lymphedema pre-transplant but continues to have  moderate generalized/LE edema on exam.  Likely that this weight loss is a combination of dry/fluid losses given nutrition hx (See above).      Updated assessed needs d/t this weight change.   Dosing Weight: 60 kg adjusted wt from admit wt of 87.5 kg on 8/28 and IBW of 50 kg   ASSESSED NUTRITION NEEDS  Estimated Energy Needs: 1800 kcals/day (30 kcals/kg)  Justification: Liver transplant needs, obesity   Estimated Protein Needs:  grams protein/day (1.5 - 2 grams of pro/kg)  Justification:  Post-op liver transplant  Estimated Fluid Needs:Per provider pending fluid status    Skin beneath nasal bridle was inspected. No issues noted.     MALNUTRITION  % Intake: Does not meet criteria recently, TF meeting majority of needs  % Weight Loss: > 2% in 1 week (severe)  Subcutaneous Fat Loss: Temporal, Facial region:  Mild-moderate  Muscle Loss: Temporal, Scapular bone, Thoracic region (clavicle, acromium bone, deltoid, trapezius, pectoral), Upper arm (bicep, tricep), Lower arm  (forearm):  Moderate-severe (per previous)  Fluid Accumulation/Edema: Mild (legs, knees), Moderate (feet)  Malnutrition Diagnosis: Severe malnutrition in the context of acute on chronic illness    Previous Goals   Total avg nutritional intake to meet a minimum of 25 kcal/kg and 1.5 g PRO/kg daily (per dosing wt 62 kg).  Evaluation: Not met    Previous Nutrition Diagnosis  Inadequate protein-energy intake  Evaluation: No change    CURRENT NUTRITION DIAGNOSIS  Inadequate protein-energy intake related to FT removal and delay in replacement, poor PO intake as evidenced by patient receiving minimal nutritional intake over the last 3 days and ~40% of assessed needs on average daily the 7 days before this.       INTERVENTIONS  Implementation  Spoke with patient and  re: PO intake/tolerance, oral supplement use, TF.    Will adjust oral supplements to patient's preferences.     Goals  Total avg nutritional intake to meet a minimum of 30 kcal/kg and  1.5g/kg protein daily (per DW 60 kg)    Monitoring/Evaluation  Progress toward goals will be monitored and evaluated per protocol.    Lela Ng MS, RD, LD  Pager [phone]

## 2019-09-11 NOTE — PLAN OF CARE
Discharge Planner PT  7A  Patient plan for discharge: Not stated  Current status: Significant progression in mobility this date, continues to demonstrate confusion though more appropriate responses noted. Pt transfers EOB<>BSC with Rafa of 1-2 and FWW, frequent cueing for safety, total A for pericares. Pt ambulates 45ft + 30ft with FWW, Rafa, 2nd person for line management, and chair follow. Frequent redirection to task required, though overall significant improvement in activity tolerance.  Barriers to return to prior living situation: Medical status, impaired cognition, weakness, decreased activity tolerance, impaired balance  Recommendations for discharge: ARC  Rationale for recommendations: Pt demonstrates significant improvement in activity tolerance and mobility this date though is well below baseline in regards to functional mobility. Pt would benefit from continued skilled intensive interdisciplinary rehab services pending improvement in cognition to progress strength, endurance, and safety and independence with mobility. Anticipate pt would be able to tolerate 3hrs of therapy per day.       Entered by: Naida Khalil 09/11/2019 11:10 AM

## 2019-09-11 NOTE — PLAN OF CARE
/72 (BP Location: Right arm)   Pulse 101   Temp 97.4  F (36.3  C) (Axillary)   Resp 14   Wt 87.5 kg (192 lb 12.8 oz)   SpO2 94%   BMI 35.26 kg/m    [id number]  Pt remains confused, only oriented to self- easily redirectable. Afebrile, all other VSS on RA 1L NC. Denied pain throughout shift. Pt did complain of nausea, given ODT zofran with good relief. Appetite remains poor, only had a couple sips of boost, water and applesauce with pills. TF going at 50 ml/hr via NJ. RIJ saline locked, one lumen occluded- plan for possible removal tomorrow. LPIV saline locked.  Voiding adequate amounts, 450ml this shift. Pt also had 1 small formed BM. Abdominal incision well approximated with sutures. Old OBED site covered with no drainage. Up with assist of 2 to bedside commode. Tramadol discontinued today to see if confusion improves without it. Brother and  came to visit with patient this evening. Please continue to monitor and update team with any changes.

## 2019-09-11 NOTE — PROGRESS NOTES
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 She initially presented on 8/16 from TCU with decompensated ANGULO cirrhosis and had a liver transplant 2 days later. She now has elevated bicarb and the question is what type of acid-base disorder she has and this is difficult to determine without an ABG.      Mixed acid-base disorder  Bicarb is 36 today (down from 38 yesterday). ABG shows 7.46/53/58. Alkalemia, with primary metabolic alkalosis (38) and a respiratory acidosis (expected PCO2 45-50). Hypothyroidism may blunt her ventilatory response to appropriately compensate for the metabolic alkalosis. We recommend correcting her hypothyroidism, and follow up BMP daily. Urine anion gap suggests a positive gap and renal involvement causing her alkalosis. She may still be manifesting as a contraction alkalosis despite her last diuretic dose being several days ago. Avoid LR as fluid.   - No new recommendations. Cr stable, HCO3 improving, avoid loop diuretics.      Electrolytes  K, Na, Ph, Ca wnl      Volume status  Appears hypervolemic based on LE edema.   - if diuretics are warranted, we can consider diamox     Recommendations were communicated to primary team via note.      Seen and discussed with Dr. Ramirez.      Moni Garza  Nephrology Fellow  [phone]    Interval History :   Nursing and provider notes from last 24 hours reviewed.    Nicci Pemberton was seen and examined this morning. She had no overnight issues. She remains confused, but denies pain or nausea/vomiting. She is having BMs. UOP is appropriate.     Review of Systems:   I reviewed the following systems:  GI: + loss of appetite. no nausea or vomiting or diarrhea.   Neuro:  moderate confusion  Constitutional:  no fever or chills  CV: no dyspnea or edema.  no chest pain.    Physical Exam:   I/O last 3 completed  shifts:  In: 1760 [P.O.:500; NG/GT:60]  Out: 800 [Urine:800]   /62 (BP Location: Right arm)   Pulse 97   Temp 98.3  F (36.8  C) (Oral)   Resp 16   Wt 88 kg (194 lb)   SpO2 96%   BMI 35.48 kg/m       GENERAL APPEARANCE: confused, not in distress  EYES:  no scleral icterus, pupils equal  HENT: mouth without ulcers or lesions  PULM: lungs clear to auscultation bilaterally, equal air movement, no clubbing  CV: regular rhythm, normal rate, no rub     -JVD no     -edema no   GI: soft, non tender, not distended, bowel sounds are normal  INTEGUMENT: no cyanosis, no rash  NEURO:  no asterixis   Access none    Labs:   All labs reviewed by me  Electrolytes/Renal -   Recent Labs   Lab Test 09/11/19  0614 09/10/19  0630 09/09/19  0630    139 136   POTASSIUM 5.2 5.1 4.1   CHLORIDE 95 97 97   CO2 36* 38* 35*   BUN 27 27 22   CR 0.83 0.86 0.94   * 130* 129*   JACOB 8.8 8.8 8.4*   MAG 1.8 1.8 1.6   PHOS 2.8 3.1 3.2       CBC -   Recent Labs   Lab Test 09/11/19  0614 09/10/19  0630 09/09/19  0630   WBC 5.3 4.3 3.4*   HGB 8.4* 8.0* 7.6*    174 172       LFTs -   Recent Labs   Lab Test 09/11/19  0614 09/10/19  0630 09/09/19  0630   ALKPHOS 156* 137 128   BILITOTAL 1.3 1.7* 1.3   ALT 31 29 24   AST 38 39 28   PROTTOTAL 5.5* 5.4* 5.1*   ALBUMIN 2.4* 2.7* 2.3*       Iron Panel -   Recent Labs   Lab Test 02/19/19  0825 06/18/18  1024   IRON 188* 220*   IRONSAT 95* 90*   DREW 825* 996*     Imaging:  All imaging studies reviewed by me.     Current Medications:    aspirin  325 mg Oral Daily     carboxymethylcellulose PF  2 drop Both Eyes Q6H     clotrimazole  1 Molly Buccal 4x Daily     cycloSPORINE modified  200 mg Oral BID IS     dapsone  100 mg Oral Daily     famotidine  20 mg Oral Daily     heparin  5,000 Units Subcutaneous Q8H     levothyroxine  125 mcg Oral Daily     magnesium oxide  400 mg Oral Daily     melatonin  3 mg Oral At Bedtime     multivitamins w/minerals  15 mL Per Feeding Tube Daily      mycophenolic acid  540 mg Oral BID IS     protein modular  1 packet Per Feeding Tube BID     sodium chloride  1 spray Both Nostrils Q4H     sodium chloride (PF)  3 mL Intravenous Q8H     sodium chloride (PF)  3 mL Intracatheter Q8H     valGANciclovir  450 mg Oral Daily    Or     valGANciclovir  450 mg Oral or NG Tube Daily     vitamin D3  1,000 Units Oral BID       IV fluid REPLACEMENT ONLY       IV fluid REPLACEMENT ONLY Stopped (09/06/19 3039)     - MEDICATION INSTRUCTIONS -       Moni Garza MD

## 2019-09-11 NOTE — PLAN OF CARE
/69 (BP Location: Right arm)   Pulse 101   Temp 97.7  F (36.5  C) (Oral)   Resp 16   Wt 88 kg (194 lb)   SpO2 95%   BMI 35.48 kg/m      Afeb., OVSS, on RA. Pt. confused to time & situation & easily redirected. Sitter at bedside for pt's safety. Pt. c/o incisional pain & warm pack applied with some relief. Tube feed at 50cc/hour via NJT. Pt. on regular diet but no appetite overnight. Pt. up with assist of 2 to commode, voided 350cc, no stools this shift. Right internal jugular saline locked with white lumen occluded. Left PIV saline locked.  Continue to follow POC & notify MD with change in status.

## 2019-09-12 ENCOUNTER — APPOINTMENT (OUTPATIENT)
Dept: OCCUPATIONAL THERAPY | Facility: CLINIC | Age: 59
DRG: 005 | End: 2019-09-12
Payer: COMMERCIAL

## 2019-09-12 ENCOUNTER — APPOINTMENT (OUTPATIENT)
Dept: PHYSICAL THERAPY | Facility: CLINIC | Age: 59
DRG: 005 | End: 2019-09-12
Payer: COMMERCIAL

## 2019-09-12 LAB
ALBUMIN SERPL-MCNC: 2.3 G/DL (ref 3.4–5)
ALP SERPL-CCNC: 163 U/L (ref 40–150)
ALT SERPL W P-5'-P-CCNC: 36 U/L (ref 0–50)
ANION GAP SERPL CALCULATED.3IONS-SCNC: 2 MMOL/L (ref 3–14)
ANISOCYTOSIS BLD QL SMEAR: ABNORMAL
AST SERPL W P-5'-P-CCNC: 37 U/L (ref 0–45)
BASOPHILS # BLD AUTO: 0 10E9/L (ref 0–0.2)
BASOPHILS NFR BLD AUTO: 0.9 %
BILIRUB DIRECT SERPL-MCNC: 0.9 MG/DL (ref 0–0.2)
BILIRUB SERPL-MCNC: 1.4 MG/DL (ref 0.2–1.3)
BUN SERPL-MCNC: 32 MG/DL (ref 7–30)
CALCIUM SERPL-MCNC: 8.8 MG/DL (ref 8.5–10.1)
CHLORIDE SERPL-SCNC: 95 MMOL/L (ref 94–109)
CO2 SERPL-SCNC: 36 MMOL/L (ref 20–32)
CREAT SERPL-MCNC: 0.96 MG/DL (ref 0.52–1.04)
CYCLOSPORINE BLD LC/MS/MS-MCNC: 208 UG/L (ref 50–400)
DIFFERENTIAL METHOD BLD: ABNORMAL
EOSINOPHIL # BLD AUTO: 0.3 10E9/L (ref 0–0.7)
EOSINOPHIL NFR BLD AUTO: 6.3 %
ERYTHROCYTE [DISTWIDTH] IN BLOOD BY AUTOMATED COUNT: 18.7 % (ref 10–15)
GFR SERPL CREATININE-BSD FRML MDRD: 65 ML/MIN/{1.73_M2}
GLUCOSE BLDC GLUCOMTR-MCNC: 126 MG/DL (ref 70–99)
GLUCOSE SERPL-MCNC: 113 MG/DL (ref 70–99)
HCT VFR BLD AUTO: 25.3 % (ref 35–47)
HGB BLD-MCNC: 7.7 G/DL (ref 11.7–15.7)
LYMPHOCYTES # BLD AUTO: 0.1 10E9/L (ref 0.8–5.3)
LYMPHOCYTES NFR BLD AUTO: 2.7 %
MAGNESIUM SERPL-MCNC: 1.9 MG/DL (ref 1.6–2.3)
MCH RBC QN AUTO: 30.6 PG (ref 26.5–33)
MCHC RBC AUTO-ENTMCNC: 30.4 G/DL (ref 31.5–36.5)
MCV RBC AUTO: 100 FL (ref 78–100)
MONOCYTES # BLD AUTO: 0.5 10E9/L (ref 0–1.3)
MONOCYTES NFR BLD AUTO: 9.8 %
MYELOCYTES # BLD: 0 10E9/L
MYELOCYTES NFR BLD MANUAL: 0.9 %
NEUTROPHILS # BLD AUTO: 4.4 10E9/L (ref 1.6–8.3)
NEUTROPHILS NFR BLD AUTO: 79.4 %
PHOSPHATE SERPL-MCNC: 3.2 MG/DL (ref 2.5–4.5)
PLATELET # BLD AUTO: 171 10E9/L (ref 150–450)
PLATELET # BLD EST: ABNORMAL 10*3/UL
POTASSIUM SERPL-SCNC: 5.4 MMOL/L (ref 3.4–5.3)
PROT SERPL-MCNC: 5.2 G/DL (ref 6.8–8.8)
RBC # BLD AUTO: 2.52 10E12/L (ref 3.8–5.2)
SODIUM SERPL-SCNC: 134 MMOL/L (ref 133–144)
TME LAST DOSE: NORMAL H
WBC # BLD AUTO: 5.5 10E9/L (ref 4–11)

## 2019-09-12 PROCEDURE — 97530 THERAPEUTIC ACTIVITIES: CPT | Mod: GO

## 2019-09-12 PROCEDURE — 82248 BILIRUBIN DIRECT: CPT | Performed by: TRANSPLANT SURGERY

## 2019-09-12 PROCEDURE — 25000132 ZZH RX MED GY IP 250 OP 250 PS 637: Performed by: STUDENT IN AN ORGANIZED HEALTH CARE EDUCATION/TRAINING PROGRAM

## 2019-09-12 PROCEDURE — 80076 HEPATIC FUNCTION PANEL: CPT | Performed by: TRANSPLANT SURGERY

## 2019-09-12 PROCEDURE — 94660 CPAP INITIATION&MGMT: CPT

## 2019-09-12 PROCEDURE — 25000128 H RX IP 250 OP 636: Performed by: PHYSICIAN ASSISTANT

## 2019-09-12 PROCEDURE — 97535 SELF CARE MNGMENT TRAINING: CPT | Mod: GO

## 2019-09-12 PROCEDURE — 25000132 ZZH RX MED GY IP 250 OP 250 PS 637: Performed by: SURGERY

## 2019-09-12 PROCEDURE — 25000132 ZZH RX MED GY IP 250 OP 250 PS 637: Performed by: PHYSICIAN ASSISTANT

## 2019-09-12 PROCEDURE — 83735 ASSAY OF MAGNESIUM: CPT | Performed by: TRANSPLANT SURGERY

## 2019-09-12 PROCEDURE — 84100 ASSAY OF PHOSPHORUS: CPT | Performed by: TRANSPLANT SURGERY

## 2019-09-12 PROCEDURE — 25000131 ZZH RX MED GY IP 250 OP 636 PS 637: Performed by: PHYSICIAN ASSISTANT

## 2019-09-12 PROCEDURE — 80158 DRUG ASSAY CYCLOSPORINE: CPT | Performed by: PHYSICIAN ASSISTANT

## 2019-09-12 PROCEDURE — 40000275 ZZH STATISTIC RCP TIME EA 10 MIN

## 2019-09-12 PROCEDURE — 00000146 ZZHCL STATISTIC GLUCOSE BY METER IP

## 2019-09-12 PROCEDURE — 97110 THERAPEUTIC EXERCISES: CPT | Mod: GP

## 2019-09-12 PROCEDURE — 27210429 ZZH NUTRITION PRODUCT INTERMEDIATE LITER

## 2019-09-12 PROCEDURE — 36592 COLLECT BLOOD FROM PICC: CPT | Performed by: TRANSPLANT SURGERY

## 2019-09-12 PROCEDURE — 12000026 ZZH R&B TRANSPLANT

## 2019-09-12 PROCEDURE — 25000125 ZZHC RX 250: Performed by: PHYSICIAN ASSISTANT

## 2019-09-12 PROCEDURE — 80053 COMPREHEN METABOLIC PANEL: CPT | Performed by: TRANSPLANT SURGERY

## 2019-09-12 PROCEDURE — 97116 GAIT TRAINING THERAPY: CPT | Mod: GP

## 2019-09-12 PROCEDURE — 97530 THERAPEUTIC ACTIVITIES: CPT | Mod: GP

## 2019-09-12 PROCEDURE — 25000132 ZZH RX MED GY IP 250 OP 250 PS 637: Performed by: TRANSPLANT SURGERY

## 2019-09-12 PROCEDURE — 85025 COMPLETE CBC W/AUTO DIFF WBC: CPT | Performed by: TRANSPLANT SURGERY

## 2019-09-12 RX ADMIN — CYCLOSPORINE 200 MG: 100 SOLUTION ORAL at 17:40

## 2019-09-12 RX ADMIN — SODIUM CHLORIDE, PRESERVATIVE FREE 20 ML: 5 INJECTION INTRAVENOUS at 05:54

## 2019-09-12 RX ADMIN — IBUPROFEN 400 MG: 400 TABLET ORAL at 08:52

## 2019-09-12 RX ADMIN — Medication 1 PACKET: at 21:29

## 2019-09-12 RX ADMIN — LEVOTHYROXINE SODIUM 175 MCG: 175 TABLET ORAL at 08:38

## 2019-09-12 RX ADMIN — CYCLOSPORINE 200 MG: 100 SOLUTION ORAL at 08:38

## 2019-09-12 RX ADMIN — MYCOPHENOLIC ACID 540 MG: 360 TABLET, DELAYED RELEASE ORAL at 08:38

## 2019-09-12 RX ADMIN — CLOTRIMAZOLE 1 TROCHE: 10 LOZENGE ORAL at 08:38

## 2019-09-12 RX ADMIN — MAGNESIUM OXIDE TAB 400 MG (241.3 MG ELEMENTAL MG) 400 MG: 400 (241.3 MG) TAB at 09:40

## 2019-09-12 RX ADMIN — Medication 5000 UNITS: at 21:16

## 2019-09-12 RX ADMIN — MELATONIN 1000 UNITS: at 08:38

## 2019-09-12 RX ADMIN — Medication 2 DROP: at 21:16

## 2019-09-12 RX ADMIN — MULTIVITAMIN 15 ML: LIQUID ORAL at 08:38

## 2019-09-12 RX ADMIN — IBUPROFEN 400 MG: 400 TABLET ORAL at 21:16

## 2019-09-12 RX ADMIN — Medication 1 PACKET: at 08:39

## 2019-09-12 RX ADMIN — VALGANCICLOVIR 450 MG: 450 TABLET, FILM COATED ORAL at 08:38

## 2019-09-12 RX ADMIN — DAPSONE 100 MG: 100 TABLET ORAL at 08:39

## 2019-09-12 RX ADMIN — MELATONIN TAB 3 MG 3 MG: 3 TAB at 21:16

## 2019-09-12 RX ADMIN — MELATONIN 1000 UNITS: at 21:16

## 2019-09-12 RX ADMIN — ASPIRIN 325 MG ORAL TABLET 325 MG: 325 PILL ORAL at 08:38

## 2019-09-12 RX ADMIN — MYCOPHENOLIC ACID 540 MG: 360 TABLET, DELAYED RELEASE ORAL at 17:40

## 2019-09-12 RX ADMIN — FAMOTIDINE 20 MG: 20 TABLET ORAL at 08:38

## 2019-09-12 RX ADMIN — CLOTRIMAZOLE 1 TROCHE: 10 LOZENGE ORAL at 21:16

## 2019-09-12 RX ADMIN — Medication 5000 UNITS: at 12:32

## 2019-09-12 ASSESSMENT — ACTIVITIES OF DAILY LIVING (ADL)
ADLS_ACUITY_SCORE: 16
ADLS_ACUITY_SCORE: 14
ADLS_ACUITY_SCORE: 14
ADLS_ACUITY_SCORE: 16
ADLS_ACUITY_SCORE: 14
ADLS_ACUITY_SCORE: 14

## 2019-09-12 ASSESSMENT — PAIN DESCRIPTION - DESCRIPTORS: DESCRIPTORS: ACHING;PRESSURE

## 2019-09-12 NOTE — PROGRESS NOTES
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 She initially presented on 8/16 from TCU with decompensated ANGULO cirrhosis and had a liver transplant 2 days later. She has primary metabolic alkalosis with compensated respiratory acidosis. She now has hyperkalemia with normal Cr. Plan and recs as below     #. Primary metabolic alkalosis with compensated respiratory acidosis  Initially consulted for bicab of 38, with ABG showing 7.46/53/58. She has alkalemia, with primary metabolic alkalosis (38) and a compensated respiratory acidosis (expected PCO2 45-50). Hypothyroidism may blunt her ventilatory response to appropriately compensate for the metabolic alkalosis. We recommend correcting her hypothyroidism, and follow up BMP daily. Urine anion gap suggests a positive gap and renal involvement causing her alkalosis. She may still be manifesting as a contraction alkalosis despite her last diuretic dose being several days ago. Avoid LR as fluid. Would also recommend getting orthostatics.   - Orthostatics (ordered for you)  - Avoid loop diuretics      #. Hyperkalemia  This is likely 2/2 calcineurin inhibitor (cyclosporine). Other hyperkalemia causes less likely.   - Trend K  - Will obtain urine lytes tomorrow (I will order these in the AM)  - Consider low K diet     #. Other Electrolytes  - Na, Ph, Ca wnl      #. Volume status  Appears hypervolemic based on LE edema. However, she could be intravascularly depleted given contraction alkalosis.   - if diuretics are warranted, we can consider diamox  - Orthostatics (ordered for you)       Recommendations were communicated to primary team via note.      Seen and discussed with Dr. Ramirez.     Yung Aranda  PGY-2,   P: [phone]    Interval History :   Nursing and provider notes from last 24 hours reviewed.  In the last 24 hours  Nicci Pemberton is improving and is less confused. No overnights issues.  at bedside this AM and very supportive.     Review of Systems:   I reviewed the following systems:  GI: better appetite. no nausea or vomiting or diarrhea.   Neuro:  no confusion  Constitutional:  no fever or chills  CV: no dyspnea but has LE edema.  no chest pain.    Physical Exam:   I/O last 3 completed shifts:  In: 950 [P.O.:320; NG/GT:30]  Out: 1150 [Urine:650; Other:500]   /70 (BP Location: Left arm)   Pulse 94   Temp 98.8  F (37.1  C) (Oral)   Resp 18   Wt 80.1 kg (176 lb 9.6 oz)   SpO2 95%   BMI 32.30 kg/m       GENERAL APPEARANCE: Sitting in chair, conversant  EYES:  no scleral icterus, pupils equal  HENT: mouth without ulcers or lesions  PULM: lungs clear to auscultation bilaterally, equal air movement, no clubbing  CV: regular rhythm, normal rate, no rub     -JVD none     -edema +2 LE edema   GI: soft, non-tender, non-distended, bowel sounds are present  NEURO: Alert and oriented to person, place and time  Access Left PIV    Labs:   All labs reviewed by me  Electrolytes/Renal -   Recent Labs   Lab Test 09/12/19  0557 09/11/19  0614 09/10/19  0630    137 139   POTASSIUM 5.4* 5.2 5.1   CHLORIDE 95 95 97   CO2 36* 36* 38*   BUN 32* 27 27   CR 0.96 0.83 0.86   * 126* 130*   JACOB 8.8 8.8 8.8   MAG 1.9 1.8 1.8   PHOS 3.2 2.8 3.1       CBC -   Recent Labs   Lab Test 09/12/19  0557 09/11/19  0614 09/10/19  0630   WBC 5.5 5.3 4.3   HGB 7.7* 8.4* 8.0*    179 174       LFTs -   Recent Labs   Lab Test 09/12/19  0557 09/11/19  0614 09/10/19  0630   ALKPHOS 163* 156* 137   BILITOTAL 1.4* 1.3 1.7*   ALT 36 31 29   AST 37 38 39   PROTTOTAL 5.2* 5.5* 5.4*   ALBUMIN 2.3* 2.4* 2.7*       Iron Panel -   Recent Labs   Lab Test 02/19/19  0825 06/18/18  1024   IRON 188* 220*   IRONSAT 95* 90*   DREW 825* 996*         Imaging:  None today    Current Medications:    aspirin  325 mg Oral Daily     carboxymethylcellulose PF   2 drop Both Eyes Q6H     clotrimazole  1 Molly Buccal 4x Daily     cycloSPORINE modified  200 mg Oral BID IS     dapsone  100 mg Oral Daily     famotidine  20 mg Oral Daily     heparin  5,000 Units Subcutaneous Q8H     levothyroxine  175 mcg Oral Daily     magnesium oxide  400 mg Oral Daily     melatonin  3 mg Oral At Bedtime     multivitamins w/minerals  15 mL Per Feeding Tube Daily     mycophenolic acid  540 mg Oral BID IS     protein modular  1 packet Per Feeding Tube BID     sodium chloride  1 spray Both Nostrils Q4H     sodium chloride (PF)  3 mL Intravenous Q8H     sodium chloride (PF)  3 mL Intracatheter Q8H     valGANciclovir  450 mg Oral Daily     vitamin D3  1,000 Units Oral BID       IV fluid REPLACEMENT ONLY       IV fluid REPLACEMENT ONLY Stopped (09/06/19 6020)

## 2019-09-12 NOTE — PLAN OF CARE
RN [id number]:  Patient oriented to self and intermittently to situation, otherwise disoriented. Sitter at bedside for safety and pulling at lines. AVSS on 1L O2 per NC. Abdominal pain well controlled with PRN Ibuprofen x1. 2gm K+ diet with poor appetite; PO intake encouraged. NJ with TF at 50 mL/hr. Voiding adequate amounts. BM x1 today. TL I.J removed per orders. PIV SL'd. Incision with sutures and c/d/I. Spinal fluid with + cultures - Gram + Bacilli noted and provider aware (see results review). Up with Ax1 with walker. Will continue to monitor and treat per plan of care.

## 2019-09-12 NOTE — PLAN OF CARE
Discharge Planner OT   Patient plan for discharge: not stated  Current status: Pt min A x2 STS transfer and in room ambulation w/ walker to chair. Pt completed ADLS w/ mod A VSS throughout  Barriers to return to prior living situation: medical status  Recommendations for discharge: TCU  Rationale for recommendations: Pt would benefit from further skilled OT to progress activity tolerance and cognition to improve ADL/IADL participation.       Entered by: Ольга Jessica 09/12/2019 4:34 PM        Please call regarding a two day hold for eliquis 4/18 and 4/19 before patient's colonoscopy.

## 2019-09-12 NOTE — PLAN OF CARE
PT 7A: Up with CGA using gait belt + FWW. If ambulating with patient, use WC to follow. PT following 6x/week.     Discharge Planner PT   Patient plan for discharge: Did not discuss today  Current status: Engaged pt in sit <> stand transfers from varying surface heights at [facility] with FWW, stepping pivot transfers with FWW at CGA + min-mod A at FWW to navigate turning, dependent jessi-cares, gait training with FWW at [facility] for bouts of 10+ 60 + 100 + 25ft with WC follow and seated rest break between each bout, EOB > supine at min A with moderated adherence to abdominal precautions, and LE strengthening exercises.     Pt on RA for activity with sats 92-93%. With pt supine, she demonstrating increased confusion and desat to 85% when on RA, so replaced 1L O2 via NC pt was originally wearing at start of session.     Barriers to return to prior living situation: medical status, cognition, home set up, edema, post op precautions  Recommendations for discharge: ARU  Rationale for recommendations: Pt is well below her IND baseline for mobility. She is tolerating therapy well, would likely tolerate 3 hours of therapy/day, and progressing with each therapy session.  is very supportive. Pt is limited by impairments to strength, balance, and endurance. She also has needs for at least 2 disciplines.        Entered by: Donna Lopez 09/12/2019 1:23 PM

## 2019-09-12 NOTE — PROGRESS NOTES
Transplant Surgery  Inpatient Daily Progress Note  09/12/2019    Assessment & Plan: Nicci Pemberton is a 58 yo female with a past medical history significant for end stage liver disease 2/2 ANGULO and alpha 1 anti-trypsin deficiency now s/p DD OLT on 8/18/19.     Graft function: ALK phos, AST and ALT stable. Tbili 1.4<-1.3<-1.7<-1.3. Will cont to monitor closely.     Immunosuppression management: Tac now stopped due to prolonged delirium, CSA started. MMF increased to 1000 mg BID during transition of CNI. Due to nausea and almost therapeutic CSA level changed to Myfortic 540 mg BID. Re-started pred 5mg due to transition between meds, now discontinued.  mg BID. 9/12 level 208. Goal 200-300.   Complexity of management: Medium. Contributing factors: anemia and induction , encephalopathy.     Hematology: Acute blood loss anemia. Transfusion goal hgb > 7. Hgb stable. Last transfused 3 units 8/20 and 1 unit 9/8.     HEENT: Post-op nasal bleeding due to friable mucosa, managed with packing, now resolved.     Cardiorespiratory: Patient extubated 8/24. Stable on 1 L oxygen. Encourage IS and ambulation.   Hx of hypertension on spironolactone and bumex at home.     Neuro: Toxic and metabolic encephalopathy likely secondary to poor sleep, drugs (tac, pain medication), acute illness. Ordered melatonin and bedtime seroquel to help patient sleep, patient's mental status has seemed to be improving overall, but fluctuates. MRI head completed- results unremarkable. Discontinued Tacrolimus. Consulted neurology, recommended LP to rule out infectious etiology. Video EEG monitoring completed, no evidence of seizures.  9/5 LP, results thus far negative for infection. WBC 1. Protein 44 and glucose 51. Negative CSF studies: cryptococcal, enterovirus, HSV, VZV.    -VBG reviewed. Avoid medications that cause respiratory depression.   -Continue scheduled melatonin and Seroquel at bedtime.   -Continue bedside sitter due to pulling at tubes.    -work on day/night cycles    GI/Nutrition: NAKIA. NJ in place. TF at goal. Now has bedside sitter due to confusion and pulling out NJs.     Endocrine: Steroid induced hyperglycemia, resolved    Fluid/Electrolytes: GAMAL- CRRT stopped and patient has required no further HD. Incontinent of urine, so difficult to quantify, but Cr now normalized. Cr 0.96. CO 35->38->36. K 5.2. Likely due to mixed acid-base disorder. Consulted nephrology for recommendations due to alkalosis- no new recommendations today, recommeded avoiding loop diuretics. TSH 17, T4 free 0.7. Chloride urine 12, sodium urine 17, K urine 30. BG pending. Will discuss with nephrology today. Poor PO intake due to confusion. TF and free water restarted.     : Incontinent    Infectious disease: AF, WBC WNL. Ppx with micafungin x 10 days completed, zosyn completed. PPx with dapsone and valcyte.    Prophylaxis: Heparin subcutaneous TID.     Activity: OOB to chair today. PT/OT.     Disposition: 7A     Medical Decision Making: Medium  Subsequent visit 55523 (moderate level decision making)    VEENA/Fellow/Resident Provider: Renee Allen MD Fellow [id number]    Faculty: Selena Howard MD    ____________________________________________________________  Transplant History: Admitted 8/16/2019 for acute decompensated ANGULO.   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    8/18/2019 (Liver), Postoperative day: 25     Interval History:   More alert this AM. Some abdominal pain, no bowel movements since yesterday.    ROS:   A 10-point review of systems was negative except as noted above.    Curent Meds:    aspirin  325 mg Oral Daily     carboxymethylcellulose PF  2 drop Both Eyes Q6H     clotrimazole  1 Molly Buccal 4x Daily     cycloSPORINE modified  200 mg Oral BID IS     dapsone  100 mg Oral Daily     famotidine  20 mg Oral Daily     heparin  5,000 Units Subcutaneous Q8H     levothyroxine  175 mcg Oral Daily     magnesium oxide  400 mg  Oral Daily     melatonin  3 mg Oral At Bedtime     multivitamins w/minerals  15 mL Per Feeding Tube Daily     mycophenolic acid  540 mg Oral BID IS     protein modular  1 packet Per Feeding Tube BID     sodium chloride  1 spray Both Nostrils Q4H     sodium chloride (PF)  3 mL Intravenous Q8H     sodium chloride (PF)  3 mL Intracatheter Q8H     valGANciclovir  450 mg Oral Daily     vitamin D3  1,000 Units Oral BID       Physical Exam:     Admit Weight: 104.3 kg (230 lb)    Current Vitals:   /61 (BP Location: Right arm)   Pulse 97   Temp 98.8  F (37.1  C) (Oral)   Resp 18   Wt 80.1 kg (176 lb 9.6 oz)   SpO2 97%   BMI 32.30 kg/m      Vital sign ranges:    Temp:  [98  F (36.7  C)-99.1  F (37.3  C)] 98.8  F (37.1  C)  Pulse:  [94-97] 97  Heart Rate:  [] 101  Resp:  [16-18] 18  BP: ()/(61-74) 103/61  SpO2:  [85 %-97 %] 97 %  Patient Vitals for the past 24 hrs:   BP Temp Temp src Pulse Heart Rate Resp SpO2 Weight   09/12/19 1123 -- -- -- -- -- -- -- 80.1 kg (176 lb 9.6 oz)   09/12/19 0756 103/61 98.8  F (37.1  C) Oral -- 101 18 97 % --   09/12/19 0302 97/71 98.6  F (37  C) Oral -- 93 16 95 % --   09/11/19 2336 108/74 98  F (36.7  C) Oral -- 95 16 94 % --   09/11/19 2026 -- 98.3  F (36.8  C) Oral -- -- -- -- --   09/11/19 1900 118/72 99.1  F (37.3  C) Axillary 97 -- 16 94 % --   09/11/19 1500 113/72 98.3  F (36.8  C) Axillary 94 -- 18 95 % --   09/11/19 1220 -- -- -- -- -- -- 96 % --   09/11/19 1218 -- -- -- -- -- -- (!) 85 % --   09/11/19 1215 112/62 98.3  F (36.8  C) Oral 97 -- 16 92 % --     General Appearance: NAD  HEENT: Feeding tube in place  Skin: normal, warm, scattered bruising  Heart: RRR  Lungs: NLB on 1L  Abdomen: soft, nondistended, incision sutured, c/d/i.   : Wearing depends  Extremities: edema: present bilaterally. 2+. Wearing lymphedema wraps.  Neurologic: alert      Data:   CMP  Recent Labs   Lab 09/12/19  0557 09/11/19  0614    137   POTASSIUM 5.4* 5.2   CHLORIDE 95 95    CO2 36* 36*   * 126*   BUN 32* 27   CR 0.96 0.83   GFRESTIMATED 65 77   GFRESTBLACK 75 90   JACOB 8.8 8.8   MAG 1.9 1.8   PHOS 3.2 2.8   ALBUMIN 2.3* 2.4*   BILITOTAL 1.4* 1.3   ALKPHOS 163* 156*   AST 37 38   ALT 36 31     CBC  Recent Labs   Lab 09/12/19  0557 09/11/19  0614   HGB 7.7* 8.4*   WBC 5.5 5.3    179         COAGS  No lab results found in last 7 days.    Invalid input(s): XA

## 2019-09-12 NOTE — PLAN OF CARE
VS: VSS, afebrile, on 1 L oxygen via NC; BiPAP order/s old, discontinued by transplant cross cover MD  Mental Status: A&O x1 overnight; forgetful to time, date, situation; sitter at bedside for history of pulling out feeding tube/s;paranoid, frustrated in the morning; refusing all medication/expressing desire to go home.   Cardiac: WNL, denies chest pain   Respiratory: LS diminished in the bases, denies SOB, on oxygen/no previous home oxygen  GI/: voiding icteric colored urine via bedpan; see I&O's; abdomen rounded, + bowel tones, + flatus, no BM this shift; clam-shell incision SILVIA with sutures; NJ tube patent to tube feed @ 50, goal rate.   Pain: denied pain   Diet: regular diet, continuous tube feed @ goal of 50 mL/hr  Mobility: PT/OT following - up with 1-2 assist to BSC  Treatment:monitor for mentation clear/ing   DC plan: TBD; probable TCU prior to home w/

## 2019-09-12 NOTE — PLAN OF CARE
/72 (BP Location: Right arm)   Pulse 97   Temp 98.3  F (36.8  C) (Oral)   Resp 16   Wt 88 kg (194 lb)   SpO2 94%   BMI 35.48 kg/m    [id number]    Pt only oriented to self this evening, was able to answer some questions correctly but still has difficulty with word finding. Afebrile, all other VSS on 1 L NC. Pt complained of abdominal pain this evening when ambulating to commode, team notified and ibuprofen administered with good relief. Denied nausea throughout shift. Appetite remains poor on regular diet, only ate a few bites of dinner. RIJ saline locked, LPIV also saline locked, flushes well. Voiding adequate amounts, 500 ml output this shift. Pt also had small BM mixed in with urine, no incontinence. Abdominal incision well approximated with sutures. Old OBED site clean, dry and intact. Up with assist 1-2, spent some of the evening up in the chair. Pt's  and mother came to visit this evening. Attendant at bedside for safety and patient history of pulling at lines. Please continue to monitor and update team with any changes.

## 2019-09-12 NOTE — PLAN OF CARE
OT/7A: Discharge Planner OT   Patient plan for discharge: Pt reports preference to go home. Educated pt on ARU vs TCU. Pt reports will discuss further rehab stay with .  Current status: Required VC's and Min A for logroll supine>EOB. Required VC's for hand placement and CGA/Min A during sit<>stand x5, standing at bedside table ~6min, SPT to commode and recliner. Attempted pericares, however, pt reports limited by edema. Required max A x2 for standing pericares. Required frequent verbal cues and mod A during standing ADL's for proper use of materials. VSS on 1L O2.  Barriers to return to prior living situation: cognition, activity tolerance, level of assist, abdominal precautions  Recommendations for discharge: ARU  Rationale for recommendations: Pt would benefit from continued skilled therapy as is functioning below baseline regarding ADL/IADL participation. Pt would tolerate and be motivated to participate in 3hrs of interdisciplinary therapy and has social supports.       Entered by: Jasmyne River 09/12/2019 3:37 PM

## 2019-09-12 NOTE — PLAN OF CARE
Edema/7A: Cancel. Pt with multiple other providers during time of attempt, will reschedule for tomorrow.

## 2019-09-13 ENCOUNTER — APPOINTMENT (OUTPATIENT)
Dept: OCCUPATIONAL THERAPY | Facility: CLINIC | Age: 59
DRG: 005 | End: 2019-09-13
Payer: COMMERCIAL

## 2019-09-13 LAB
ALBUMIN SERPL-MCNC: 2.2 G/DL (ref 3.4–5)
ALP SERPL-CCNC: 183 U/L (ref 40–150)
ALT SERPL W P-5'-P-CCNC: 38 U/L (ref 0–50)
ANION GAP SERPL CALCULATED.3IONS-SCNC: 2 MMOL/L (ref 3–14)
ANISOCYTOSIS BLD QL SMEAR: SLIGHT
AST SERPL W P-5'-P-CCNC: 44 U/L (ref 0–45)
BASOPHILS # BLD AUTO: 0 10E9/L (ref 0–0.2)
BASOPHILS NFR BLD AUTO: 0 %
BILIRUB DIRECT SERPL-MCNC: 1 MG/DL (ref 0–0.2)
BILIRUB SERPL-MCNC: 1.5 MG/DL (ref 0.2–1.3)
BUN SERPL-MCNC: 38 MG/DL (ref 7–30)
CALCIUM SERPL-MCNC: 8.8 MG/DL (ref 8.5–10.1)
CHLORIDE SERPL-SCNC: 96 MMOL/L (ref 94–109)
CHLORIDE UR-SCNC: 31 MMOL/L
CO2 SERPL-SCNC: 36 MMOL/L (ref 20–32)
CREAT SERPL-MCNC: 1.11 MG/DL (ref 0.52–1.04)
CYCLOSPORINE BLD LC/MS/MS-MCNC: 193 UG/L (ref 50–400)
DIFFERENTIAL METHOD BLD: ABNORMAL
EOSINOPHIL # BLD AUTO: 0.6 10E9/L (ref 0–0.7)
EOSINOPHIL NFR BLD AUTO: 11.3 %
ERYTHROCYTE [DISTWIDTH] IN BLOOD BY AUTOMATED COUNT: 18.5 % (ref 10–15)
GFR SERPL CREATININE-BSD FRML MDRD: 54 ML/MIN/{1.73_M2}
GLUCOSE SERPL-MCNC: 103 MG/DL (ref 70–99)
HCT VFR BLD AUTO: 26.9 % (ref 35–47)
HGB BLD-MCNC: 8 G/DL (ref 11.7–15.7)
HYPOCHROMIA BLD QL: PRESENT
LAB SCANNED RESULT: ABNORMAL
LYMPHOCYTES # BLD AUTO: 0.1 10E9/L (ref 0.8–5.3)
LYMPHOCYTES NFR BLD AUTO: 2.6 %
MAGNESIUM SERPL-MCNC: 1.9 MG/DL (ref 1.6–2.3)
MCH RBC QN AUTO: 30.1 PG (ref 26.5–33)
MCHC RBC AUTO-ENTMCNC: 29.7 G/DL (ref 31.5–36.5)
MCV RBC AUTO: 101 FL (ref 78–100)
MONOCYTES # BLD AUTO: 0.5 10E9/L (ref 0–1.3)
MONOCYTES NFR BLD AUTO: 9.6 %
MYELOCYTES # BLD: 0.1 10E9/L
MYELOCYTES NFR BLD MANUAL: 1.7 %
NEUTROPHILS # BLD AUTO: 3.9 10E9/L (ref 1.6–8.3)
NEUTROPHILS NFR BLD AUTO: 74.8 %
PHOSPHATE SERPL-MCNC: 3.3 MG/DL (ref 2.5–4.5)
PLATELET # BLD AUTO: 164 10E9/L (ref 150–450)
PLATELET # BLD EST: ABNORMAL 10*3/UL
POLYCHROMASIA BLD QL SMEAR: SLIGHT
POTASSIUM SERPL-SCNC: 5.3 MMOL/L (ref 3.4–5.3)
POTASSIUM UR-SCNC: 39 MMOL/L
PROT SERPL-MCNC: 5.4 G/DL (ref 6.8–8.8)
RBC # BLD AUTO: 2.66 10E12/L (ref 3.8–5.2)
SODIUM SERPL-SCNC: 135 MMOL/L (ref 133–144)
SODIUM UR-SCNC: 45 MMOL/L
TME LAST DOSE: NORMAL H
WBC # BLD AUTO: 5.2 10E9/L (ref 4–11)

## 2019-09-13 PROCEDURE — 40000141 ZZH STATISTIC PERIPHERAL IV START W/O US GUIDANCE

## 2019-09-13 PROCEDURE — 25000131 ZZH RX MED GY IP 250 OP 636 PS 637: Performed by: PHYSICIAN ASSISTANT

## 2019-09-13 PROCEDURE — 25000132 ZZH RX MED GY IP 250 OP 250 PS 637: Performed by: TRANSPLANT SURGERY

## 2019-09-13 PROCEDURE — 36415 COLL VENOUS BLD VENIPUNCTURE: CPT | Performed by: TRANSPLANT SURGERY

## 2019-09-13 PROCEDURE — 97530 THERAPEUTIC ACTIVITIES: CPT | Mod: GO

## 2019-09-13 PROCEDURE — 80053 COMPREHEN METABOLIC PANEL: CPT | Performed by: TRANSPLANT SURGERY

## 2019-09-13 PROCEDURE — 25000128 H RX IP 250 OP 636: Performed by: PHYSICIAN ASSISTANT

## 2019-09-13 PROCEDURE — 84100 ASSAY OF PHOSPHORUS: CPT | Performed by: TRANSPLANT SURGERY

## 2019-09-13 PROCEDURE — 82436 ASSAY OF URINE CHLORIDE: CPT

## 2019-09-13 PROCEDURE — 25000132 ZZH RX MED GY IP 250 OP 250 PS 637: Performed by: PHYSICIAN ASSISTANT

## 2019-09-13 PROCEDURE — 83735 ASSAY OF MAGNESIUM: CPT | Performed by: TRANSPLANT SURGERY

## 2019-09-13 PROCEDURE — 85025 COMPLETE CBC W/AUTO DIFF WBC: CPT | Performed by: TRANSPLANT SURGERY

## 2019-09-13 PROCEDURE — 25000132 ZZH RX MED GY IP 250 OP 250 PS 637: Performed by: STUDENT IN AN ORGANIZED HEALTH CARE EDUCATION/TRAINING PROGRAM

## 2019-09-13 PROCEDURE — 25000132 ZZH RX MED GY IP 250 OP 250 PS 637: Performed by: SURGERY

## 2019-09-13 PROCEDURE — 82248 BILIRUBIN DIRECT: CPT | Performed by: TRANSPLANT SURGERY

## 2019-09-13 PROCEDURE — 27210432 ZZH NUTRITION PRODUCT RENAL BASIC LITER

## 2019-09-13 PROCEDURE — 84300 ASSAY OF URINE SODIUM: CPT

## 2019-09-13 PROCEDURE — 12000026 ZZH R&B TRANSPLANT

## 2019-09-13 PROCEDURE — 84133 ASSAY OF URINE POTASSIUM: CPT

## 2019-09-13 PROCEDURE — 97140 MANUAL THERAPY 1/> REGIONS: CPT | Mod: GO | Performed by: OCCUPATIONAL THERAPIST

## 2019-09-13 PROCEDURE — 97535 SELF CARE MNGMENT TRAINING: CPT | Mod: GO

## 2019-09-13 PROCEDURE — 25000125 ZZHC RX 250: Performed by: PHYSICIAN ASSISTANT

## 2019-09-13 PROCEDURE — 80158 DRUG ASSAY CYCLOSPORINE: CPT | Performed by: PHYSICIAN ASSISTANT

## 2019-09-13 RX ORDER — AMINO AC/PROTEIN HYDR/WHEY PRO 10G-100/30
1 LIQUID (ML) ORAL DAILY
Status: DISCONTINUED | OUTPATIENT
Start: 2019-09-14 | End: 2019-09-23 | Stop reason: HOSPADM

## 2019-09-13 RX ADMIN — LEVOTHYROXINE SODIUM 175 MCG: 175 TABLET ORAL at 09:29

## 2019-09-13 RX ADMIN — CYCLOSPORINE 200 MG: 100 SOLUTION ORAL at 09:28

## 2019-09-13 RX ADMIN — DAPSONE 100 MG: 100 TABLET ORAL at 09:28

## 2019-09-13 RX ADMIN — CLOTRIMAZOLE 1 TROCHE: 10 LOZENGE ORAL at 12:03

## 2019-09-13 RX ADMIN — Medication 5000 UNITS: at 21:06

## 2019-09-13 RX ADMIN — Medication 1 PACKET: at 09:29

## 2019-09-13 RX ADMIN — IBUPROFEN 400 MG: 400 TABLET ORAL at 01:11

## 2019-09-13 RX ADMIN — MYCOPHENOLIC ACID 540 MG: 360 TABLET, DELAYED RELEASE ORAL at 09:28

## 2019-09-13 RX ADMIN — CLOTRIMAZOLE 1 TROCHE: 10 LOZENGE ORAL at 09:29

## 2019-09-13 RX ADMIN — Medication 12.5 MG: at 01:11

## 2019-09-13 RX ADMIN — MULTIVITAMIN 15 ML: LIQUID ORAL at 09:28

## 2019-09-13 RX ADMIN — VALGANCICLOVIR 450 MG: 450 TABLET, FILM COATED ORAL at 09:28

## 2019-09-13 RX ADMIN — MELATONIN 1000 UNITS: at 09:28

## 2019-09-13 RX ADMIN — MELATONIN 1000 UNITS: at 21:06

## 2019-09-13 RX ADMIN — MAGNESIUM OXIDE TAB 400 MG (241.3 MG ELEMENTAL MG) 400 MG: 400 (241.3 MG) TAB at 09:28

## 2019-09-13 RX ADMIN — MELATONIN TAB 3 MG 3 MG: 3 TAB at 21:06

## 2019-09-13 RX ADMIN — ASPIRIN 325 MG ORAL TABLET 325 MG: 325 PILL ORAL at 09:29

## 2019-09-13 RX ADMIN — CYCLOSPORINE 200 MG: 100 SOLUTION ORAL at 17:57

## 2019-09-13 RX ADMIN — MYCOPHENOLIC ACID 540 MG: 360 TABLET, DELAYED RELEASE ORAL at 17:57

## 2019-09-13 RX ADMIN — OXYMETAZOLINE HYDROCHLORIDE 2 SPRAY: 0.05 SPRAY NASAL at 16:21

## 2019-09-13 RX ADMIN — FAMOTIDINE 20 MG: 20 TABLET ORAL at 09:28

## 2019-09-13 RX ADMIN — Medication 5000 UNITS: at 12:03

## 2019-09-13 ASSESSMENT — ACTIVITIES OF DAILY LIVING (ADL)
ADLS_ACUITY_SCORE: 14
ADLS_ACUITY_SCORE: 14
ADLS_ACUITY_SCORE: 22
ADLS_ACUITY_SCORE: 13.5
ADLS_ACUITY_SCORE: 13.5
ADLS_ACUITY_SCORE: 14

## 2019-09-13 NOTE — PLAN OF CARE
OT/7A - Discharge Planner OT   Patient plan for discharge: rehab  Current status: Facilitated functional mobility to/from bathroom with min A + FWW. Provided min A for toilet transfer, max A for pericare. Requires frequent verbal cues to sequence task due to cognitive deficits and confusion.   Barriers to return to prior living situation: cognition, strength, activity tolerance, post surgical precautions  Recommendations for discharge: ARU  Rationale for recommendations: Pt would likely tolerate 3 hours of therapy daily with adequate rest breaks. Pt demonstrates needs for multiple therapy disciplines and has good family support.        Entered by: Kimberley Chicas 09/13/2019 2:23 PM

## 2019-09-13 NOTE — PROGRESS NOTES
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     Graft function: DD OLT 8/18/19 without stent-POD #26  Transaminases, AP stable. Tbili uptrending, 1.5(1.4). Consult Panc/bili team for possible ERCP-will continue to trend.     Immunosuppression management:   Tac - stopped due to prolonged delirium,   CSA started 9/2. Continue 200 mg BID to titrate to a goal of 200-300. 9/13 level 193-no dose adjustment.   MMF increased to 1000 mg BID during transition of CNI. Due to nausea,  changed to Myfortic 540 mg BID.   Pred 5mg when transitioning IS meds, now discontinued.   Complexity of management: Medium. Contributing factors: anemia and induction , encephalopathy.     Hematology: Acute blood loss anemia. Transfusion goal hgb > 7. Hgb stable, 8 today. Last transfused 3 units 8/20 and 1 unit 9/8.     HEENT: Epistaxis: due to friable mucosa, managed with packing, now resolved.     Cardiorespiratory: Patient extubated 8/24. RA. Encourage IS and ambulation.     Neuro:   Toxic and metabolic encephalopathy likely secondary to poor sleep, drugs (tac, pain medication), acute illness. Ordered melatonin and bedtime seroquel to help patient sleep, patient's mental status has seemed to be improving overall, but waxes/wanes.   -MRI head 9/3- results unremarkable. Discontinued Tacrolimus. Consulted neurology, recommended LP to rule out infectious etiology and Video EEG monitoring completed, no evidence of seizures.    9/5 LP, results thus far negative for infection. WBC 1. Protein 44 and glucose 51. Negative CSF studies: cryptococcal, enterovirus, HSV, VZV.    -VBG reviewed. Avoid medications that cause respiratory depression.   -Continue scheduled melatonin and Seroquel at bedtime.   -Continue bedside sitter due to pulling at  tubes.   -work on day/night cycles     GI/Nutrition: Regular diet, NJ in place. Continue EN-Change to Nepro.     Endocrine: Steroid induced hyperglycemia, resolved    Fluid/Electrolytes:   GAMAL- CRRT stopped and patient has required no further HD. Incontinent of urine, so difficult to quantify, but Cr now normalized. SCr 1.1.   Metabolic alkalosis, with compensated respiratory acidosis: CO2  30's. Nephrology following.    avoiding loop diuretics, obtain urine lytes.   Hyperkalemia: K 5.3-will change EN to Nepro.   recommeded.     : Incontinent    Infectious disease: AF, WBC WNL. Ppx with micafungin x 10 days completed, zosyn completed. PPx with dapsone and valcyte.    Prophylaxis: Heparin subcutaneous TID.     Activity: OOB to chair today. PT/OT.     Disposition: 7A     Medical Decision Making: Medium  Subsequent visit 52592 (moderate level decision making)    VEENA/Fellow/Resident Provider: Yvette Reich NP      Faculty: Selena Howard MD    ____________________________________________________________  Transplant History: Admitted 8/16/2019 for acute decompensated ANGULO.   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    8/18/2019 (Liver), Postoperative day: 26     Interval History:   More alert this AM. Some abdominal pain, no bowel movements since yesterday.    ROS:   A 10-point review of systems was negative except as noted above.    Curent Meds:    aspirin  325 mg Oral Daily     carboxymethylcellulose PF  2 drop Both Eyes Q6H     clotrimazole  1 Molly Buccal 4x Daily     cycloSPORINE modified  200 mg Oral BID IS     dapsone  100 mg Oral Daily     famotidine  20 mg Oral Daily     heparin  5,000 Units Subcutaneous Q8H     levothyroxine  175 mcg Oral Daily     magnesium oxide  400 mg Oral Daily     melatonin  3 mg Oral At Bedtime     multivitamins w/minerals  15 mL Per Feeding Tube Daily     mycophenolic acid  540 mg Oral BID IS     [START ON 9/14/2019] protein modular  1  packet Per Feeding Tube Daily     sodium chloride  1 spray Both Nostrils Q4H     sodium chloride (PF)  3 mL Intravenous Q8H     sodium chloride (PF)  3 mL Intracatheter Q8H     valGANciclovir  450 mg Oral Daily     vitamin D3  1,000 Units Oral BID       Physical Exam:     Admit Weight: 104.3 kg (230 lb)    Current Vitals:   /77 (BP Location: Left arm)   Pulse 94   Temp 98.3  F (36.8  C) (Oral)   Resp 18   Wt 89.3 kg (196 lb 14.4 oz)   SpO2 95%   BMI 36.01 kg/m      Vital sign ranges:    Temp:  [97.5  F (36.4  C)-98.8  F (37.1  C)] 98.3  F (36.8  C)  Pulse:  [94] 94  Heart Rate:  [94-96] 94  Resp:  [16-18] 18  BP: (100-104)/(62-77) 104/77  SpO2:  [92 %-95 %] 95 %  Patient Vitals for the past 24 hrs:   BP Temp Temp src Pulse Heart Rate Resp SpO2 Weight   09/13/19 1112 -- 98.3  F (36.8  C) Oral -- -- 18 -- 89.3 kg (196 lb 14.4 oz)   09/13/19 0737 104/77 97.8  F (36.6  C) Oral -- 94 18 95 % --   09/13/19 0500 100/62 97.5  F (36.4  C) Oral -- 95 16 92 % --   09/12/19 2351 102/69 98.7  F (37.1  C) Oral -- 95 18 93 % --   09/12/19 2000 -- 98.5  F (36.9  C) Oral -- -- 18 -- --   09/12/19 1748 103/70 -- -- -- 96 -- -- --   09/12/19 1600 102/71 98.8  F (37.1  C) Oral 94 -- 18 95 % --     General Appearance: NAD  HEENT: Feeding tube in place  Skin: normal, warm, scattered bruising  Heart: RRR  Lungs: NLB on RA  Abdomen: soft, nondistended, incision sutured, c/d/i. Small pustule to right lateral superior portion of incision.   : Wearing depends  Extremities: edema: present bilaterally. 2+. Wearing lymphedema wraps.  Neurologic: alert, oriented x1. aggitated and impulsive at times.       Data:   CMP  Recent Labs   Lab 09/13/19  0549 09/12/19  0557    134   POTASSIUM 5.3 5.4*   CHLORIDE 96 95   CO2 36* 36*   * 113*   BUN 38* 32*   CR 1.11* 0.96   GFRESTIMATED 54* 65   GFRESTBLACK 63 75   JACOB 8.8 8.8   MAG 1.9 1.9   PHOS 3.3 3.2   ALBUMIN 2.2* 2.3*   BILITOTAL 1.5* 1.4*   ALKPHOS 183* 163*   AST 44 37    ALT 38 36     CBC  Recent Labs   Lab 09/13/19  0549 09/12/19  0557   HGB 8.0* 7.7*   WBC 5.2 5.5    171         COAGS  No lab results found in last 7 days.    Invalid input(s): XA

## 2019-09-13 NOTE — PROGRESS NOTES
Nutrition Services Brief Progress Note - please see note from 9/11 for full reassessment     Diet: 2 gram K+  EN regimen: Nutren 1.5 @ 50 mL/hr + 2 pkts Prosource TF to provide 1880 kcals (31 kcal/kg/day), 104 g PRO (1.7g/kg/day), 912 mL H2O, 211 g CHO and no fiber daily.    Labs (9/13): K+ 5.3 mmol/L, phos 3.3 mg/dL, Mg++ 1.9 mg/dL     Interventions  1. Discussed FEN/GI with team. Team okayed this write to change EN regimen: Nepro @ goal 45 ml/hr (1080 ml/day) + 1 pkt Prosource TF to provide 1994 kcals (33 kcal/kg/day), 98 g PRO (1.6 g/kg/day), 788 ml free H2O, 174 g CHO and 14 g Fiber daily.  2. Order calorie counts when patient's appetite improves    Unit RD will continue to follow per protocol  Jenifer Otero, MS/RD/LD/CNSC  [address] RD Pager: [phone]

## 2019-09-13 NOTE — PLAN OF CARE
OT/Edema: Pt with 1-2+ pitting edema in feet, ankles and legs. Skin integrity intact. After skin cares GCBs placed from MTPs to knee creases. Please remove garment if it gets soiled or if pt c/o LE pain or numbness. Will return 9/15 for skin check.

## 2019-09-13 NOTE — PROGRESS NOTES
Social Work Services Progress Note    Hospital Day: 29  Date of Initial Social Work Evaluation:  8/29/19  Collaborated with:  Pt's     Data:  Nicci Pemberton is a 59 year old with history of ANGULO/alpha-anti trypsin cirrhosis now s/p liver tx on 8/18/2019 . She initially presented on 8/16 from TCU with decompensated ANGULO cirrhosis and had a liver transplant 2 days later. She has primary metabolic alkalosis with compensated respiratory acidosis. OT and PT services are recommending ARU placement when patient is ready for discharge.    Intervention:  I called and left a message today for Nicci's , Keny Pemberton, to support him and assess for any social work needs.  I await his return call.    UPDATE:  3:11 PM  Hugo Pemberton called me back.  He states that he is optimistic about Nicci's continued recovery.  He states that yes, they are planning to stay within the [facility] system and transfer to ARU/TCU  when Nicci is stable.  He states that Nicci is still having trouble with her cognition, specifically understanding why she is here in the hospital.  Hugo is hopeful that she will be more clear next week and able to transition to rehab.  I let Hugo know that Tracy, liver transplant , will be back in the office on Monday and I would hand off to her.     Assessment:  OT and PT inpatient services are recommending ARU placement when patient is medically stable to discharge. Pt's  is in agreement with this plan.    Plan:    Anticipated Disposition:  Facility:  [facility]    Barriers to d/c plan:  Medical stability    Follow Up:  SW to continue to follow.    ARIEL Bang, [facility] CenterSW  Clinical  and Independent Donor Advocate - COVERING this patient for ARIEL Valerio, WANDA, liver transplant   [facility]  Pager:  [phone]  Direct:  [phone]

## 2019-09-13 NOTE — CONSULTS
GASTROENTEROLOGY CONSULTATION      Date of Admission:  8/16/2019           Reason for Consultation:   We were asked by Dr. Moses of Transplant Surgery to evaluate this patient with elevated bilirubin in the setting of post liver transplant           ASSESSMENT AND RECOMMENDATIONS:   Assessment:  59 year old female HD#29 with a history of elevated bilirubin and ALP post liver transplant on 8/18/19 for decompensated ANGULO cirrhosis c/b hx of heterozygous A1AT deficiency. Labs suspicious for stricture of end to end anastomosis.    # Potential biliary stricture  Pt currently has an elevated direct bilirubin and tbili. Although elevated, the direct bilirubin has remained fairly constant ranging from .9-1.0 over the last week. ALP has been trending upward, ranging from 128-183, with maximal value being recorded today. The elevated ALP and direct bili suggest potential biliary obstruction, most likely an anastomotic stricture given recency of liver transplant.      Recommendations  --Trend LFTs  --Dr. Stubbs will discuss risks/benefits of ERCP with transplant team  --GI will continue to follow     Thank you for involving us in this patient's care. Please do not hesitate to contact the GI service with any questions or concerns.     Pt care plan discussed with Dr. Stubbs, GI staff physician.      Ean Álvarez MD  GI Fellow   [phone]    Aniket Agustin, MS3  -------------------------------------------------------------------------------------------------------------------           History of Present Illness:   Nicci Pemberton is a 59 year old female with a history of ANGULO/A1AT deficiency s/p liver tx 8/18/19 with currently elevated ALP and bilirubin. Direct bilirubin has been consistent elevated around 1.0 over the last week while ALP has been increasing. Pt also experiencing abdominal discomfort, reporting diffuse abdominal pain with no associated nausea or vomiting and also reports no fever or chills. She  also denies any signs of overt bleeding such as hematochezia, melena or hematemesis. Patient otherwise reports no complaints.            Past Medical History:   Reviewed and edited as appropriate  Past Medical History:   Diagnosis Date     Anemia      Cellulitis      Cirrhosis of liver (H) 2018     Heterozygous alpha 1-antitrypsin deficiency (H)      High hepatic iron concentration determined by biopsy of liver      Liver cirrhosis secondary to ANGULO (H)      Lymphedema      Osteoarthritis      SBP (spontaneous bacterial peritonitis) (H) 2019 in [facility]            Past Surgical History:   Reviewed and edited as appropriate   Past Surgical History:   Procedure Laterality Date     BENCH LIVER N/A 2019    Procedure: BACKBENCH PREPARATION, LIVER;  Surgeon: Mandeep Alford MD;  Location: [facility] OR     COLONOSCOPY N/A 2018    Procedure: COLONOSCOPY;  colonoscopy;  Surgeon: Hugo Fulton MD;  Location: [facility] OR     IR FEEDING TUBE PLACEMENT W MOHAN/MD  2019     IR FLUORO 0-1 HOUR  2019     IR LUMBAR PUNCTURE  2019     TRANSPLANT LIVER RECIPIENT  DONOR N/A 2019    Procedure: TRANSPLANT, LIVER, RECIPIENT,  DONOR;  Surgeon: Mandeep Alford MD;  Location: [facility] OR            Previous Endoscopy:     Results for orders placed or performed during the hospital encounter of 18   COLONOSCOPY   Result Value Ref Range    COLONOSCOPY       [facility]  [address] [place] [address], [place] [id number] [phone]     Endoscopy Department  _______________________________________________________________________________  Patient Name: Nicci Pemberton            Procedure Date: 2018 8:08 AM  MRN: [id number]                       Account Number: [id number]  YOB: 1960              Admit Type: Outpatient  Age: 58                                Gender: Female  Note Status: Finalized                Attending MD: Seth Fulton MD  Total Sedation Time: 34  minutes with 1:1 monitoring   _______________________________________________________________________________     Procedure:           Colonoscopy  Indications:         Screening for colorectal malignant neoplasm  Providers:           Seht Fulton MD, Mina Abraham RN, Germain Sanchez MD, MD  Referring MD:        Meghan Montes MD  Medicines:           Midazolam 3 mg IV, Fentanyl 50 micrograms IV  Complications:        No immediate complications.  _______________________________________________________________________________  Procedure:           Pre-Anesthesia Assessment:                       - Prior to the procedure, a History and Physical was                        performed, and patient medications and allergies were                        reviewed. The patient is competent. The risks and                        benefits of the procedure and the sedation options and                        risks were discussed with the patient. All questions                        were answered and informed consent was obtained. Patient                        identification and proposed procedure were verified by                        the physician in the pre-procedure area. Mental Status                        Examination: alert and oriented. Airway Examination:                        normal oropharyngeal airway and neck mobility.                        Respiratory Examination: clear to auscultation. CV                         Examination: normal. Prophylactic Antibiotics: The                        patient does not require prophylactic antibiotics. Prior                        Anticoagulants: The patient has taken no previous                        anticoagulant or antiplatelet agents. ASA Grade                        Assessment: III - A patient with severe systemic                        disease. After reviewing the risks and benefits, the                        patient was deemed in  satisfactory condition to undergo                        the procedure. The anesthesia plan was to use moderate                        sedation / analgesia (conscious sedation). Immediately                        prior to administration of medications, the patient was                        re-assessed for adequacy to receive sedatives. The heart                        rate, respiratory rate, oxygen saturations, blood                        pressure, adequacy of pulmonary ventilation, and                         response to care were monitored throughout the                        procedure. The physical status of the patient was                        re-assessed after the procedure.                       After obtaining informed consent, the colonoscope was                        passed under direct vision. Throughout the procedure,                        the patient's blood pressure, pulse, and oxygen                        saturations were monitored continuously. The Colonoscope                        was introduced through the anus and advanced to the                        terminal ileum. The colonoscopy was performed without                        difficulty. The patient tolerated the procedure well.                        The quality of the bowel preparation was good. The                        endoscopy was advanced using the water exchange method (                        underwater ).                                                                                   Findings:        The perianal and digital rectal examinations were normal.       The terminal ileum appeared normal.       A few medium-mouthed diverticula were found in the sigmoid colon.       No additional abnormalities were found on retroflexion.                                                                                   Impression:          - The examined portion of the ileum was normal.                       - Diverticulosis in the  sigmoid colon.                       - No specimens collected.  Recommendation:      - Discharge patient to home.                       - Repeat colonoscopy in 10 years for screening purposes.                       - Return to referring physician (date not yet                        determined).                                                                                     ___________________  Seth Fulton MD  2018 9:21:00 AM  I was physically present for the entire viewing portion of the exam.  __________________________  Signature of teaching phbaljit Fulton MD    __________________________________  Germain Sanchez MD, MD  Number of Addenda: 0    Note Initiated On: 2018 8:08 AM  Scope In:  Scope Out:              Social History:   Reviewed and edited as appropriate  Social History     Socioeconomic History     Marital status:      Spouse name: Not on file     Number of children: Not on file     Years of education: Not on file     Highest education level: Not on file   Occupational History     Not on file   Social Needs     Financial resource strain: Not on file     Food insecurity:     Worry: Not on file     Inability: Not on file     Transportation needs:     Medical: Not on file     Non-medical: Not on file   Tobacco Use     Smoking status: Former Smoker     Last attempt to quit: 2011     Years since quittin.7     Smokeless tobacco: Never Used     Tobacco comment: weekend smoker    Substance and Sexual Activity     Alcohol use: No     Drug use: No     Sexual activity: Not on file   Lifestyle     Physical activity:     Days per week: Not on file     Minutes per session: Not on file     Stress: Not on file   Relationships     Social connections:     Talks on phone: Not on file     Gets together: Not on file     Attends Voodoo service: Not on file     Active member of club or organization: Not on file     Attends meetings of clubs or organizations: Not on  file     Relationship status: Not on file     Intimate partner violence:     Fear of current or ex partner: Not on file     Emotionally abused: Not on file     Physically abused: Not on file     Forced sexual activity: Not on file   Other Topics Concern     Parent/sibling w/ CABG, MI or angioplasty before 65F 55M? Not Asked   Social History Narrative     Not on file            Family History:   Reviewed and edited as appropriate  Family History   Problem Relation Age of Onset     Cirrhosis No family hx of      Liver Cancer No family hx of      No known history of colorectal cancer, liver disease, or inflammatory bowel disease.         Allergies:   Reviewed and edited as appropriate     Allergies   Allergen Reactions     Food      Fruits with cores and pits  Apples     Sulfa Drugs Unknown     Swollen joints     Furosemide Rash            Medications:     Current Facility-Administered Medications   Medication     0.9% sodium chloride BOLUS     aspirin (ASA) tablet 325 mg     carboxymethylcellulose PF (REFRESH PLUS) 0.5 % ophthalmic solution 2 drop     clotrimazole (MYCELEX) lozenge 1 Molly     cycloSPORINE modified (GENGRAF BRAND) solution 200 mg     dapsone suspension 100 mg     dextrose 10 % 1,000 mL infusion     dextrose 10 % 1,000 mL infusion     glucose gel 15-30 g    Or     dextrose 50 % injection 25-50 mL    Or     glucagon injection 1 mg     famotidine (PEPCID) tablet 20 mg     heparin sodium injection 5,000 Units     ibuprofen (ADVIL/MOTRIN) tablet 400 mg     levothyroxine (SYNTHROID/LEVOTHROID) tablet 175 mcg     lidocaine (LMX4) cream     lidocaine (XYLOCAINE) 5 % ointment     lidocaine 1 % 0.1-1 mL     magnesium oxide (MAG-OX) tablet 400 mg     melatonin tablet 3 mg     menthol (Topical Analgesic) 2.5% (BENGAY VANISHIN SCENT) 2.5 % topical gel     multivitamins w/minerals (CERTAVITE) liquid 15 mL     mycophenolic acid (MYFORTIC BRAND) EC tablet 540 mg     naloxone (NARCAN) injection 0.1-0.4 mg      ondansetron (ZOFRAN) injection 4 mg     ondansetron (ZOFRAN-ODT) ODT tab 4 mg     oxymetazoline (AFRIN) 0.05 % spray 2 spray     polyethylene glycol (MIRALAX/GLYCOLAX) Packet 17 g     protein modular (PROSOURCE TF) 1 packet     QUEtiapine (SEROquel) half-tab 12.5 mg     senna-docusate (SENOKOT-S/PERICOLACE) 8.6-50 MG per tablet 2 tablet     sodium chloride (OCEAN) 0.65 % nasal spray 1 spray     sodium chloride (OCEAN) 0.65 % nasal spray 2 spray     sodium chloride (PF) 0.9% PF flush 3 mL     sodium chloride (PF) 0.9% PF flush 3 mL     sodium chloride (PF) 0.9% PF flush 3 mL     sodium chloride (PF) 0.9% PF flush 3 mL     sodium chloride (PF) 0.9% PF flush 3 mL     valGANciclovir (VALCYTE) tablet 450 mg     vitamin D3 (CHOLECALCIFEROL) 1000 units (25 mcg) tablet 1,000 Units             Review of Systems:     A complete 10 point review of systems was performed and is negative except as noted in the HPI           Physical Exam:   /77 (BP Location: Left arm)   Pulse 94   Temp 97.8  F (36.6  C) (Oral)   Resp 18   Wt 80.1 kg (176 lb 9.6 oz)   SpO2 95%   BMI 32.30 kg/m    Wt:   Wt Readings from Last 2 Encounters:   09/12/19 80.1 kg (176 lb 9.6 oz)   07/11/19 100.7 kg (222 lb)      Constitutional: cooperative, pleasant, some dyspnea noted.  Ears/nose/mouth/throat: Hearing intact  Respiratory: Mild SOB  Abd:  Nondistended, no hepatosplenomegaly, nontender, no peritoneal signs. Incision cdi.  Skin: warm, perfused, moderate jaundice  Neuro: AAO x 3, No asterixis  Psych: Normal affect  MSK: No gross deformities         Data:   Labs and imaging below were independently reviewed and interpreted    BMP  Recent Labs   Lab 09/13/19  0549 09/12/19  0557 09/11/19  0614 09/10/19  0630    134 137 139   POTASSIUM 5.3 5.4* 5.2 5.1   CHLORIDE 96 95 95 97   JACOB 8.8 8.8 8.8 8.8   CO2 36* 36* 36* 38*   BUN 38* 32* 27 27   CR 1.11* 0.96 0.83 0.86   * 113* 126* 130*     CBC  Recent Labs   Lab 09/13/19  0549  09/12/19  0557 09/11/19  0614 09/10/19  0630   WBC 5.2 5.5 5.3 4.3   RBC 2.66* 2.52* 2.75* 2.72*   HGB 8.0* 7.7* 8.4* 8.0*   HCT 26.9* 25.3* 27.8* 27.5*   * 100 101* 101*   MCH 30.1 30.6 30.5 29.4   MCHC 29.7* 30.4* 30.2* 29.1*   RDW 18.5* 18.7* 18.7* 18.7*    171 179 174     INRNo lab results found in last 7 days.  LFTs  Recent Labs   Lab 09/13/19  0549 09/12/19  0557 09/11/19  0614 09/10/19  0630   ALKPHOS 183* 163* 156* 137   AST 44 37 38 39   ALT 38 36 31 29   BILITOTAL 1.5* 1.4* 1.3 1.7*   PROTTOTAL 5.4* 5.2* 5.5* 5.4*   ALBUMIN 2.2* 2.3* 2.4* 2.7*      PANCNo lab results found in last 7 days.    Imaging: No recent imaging performed

## 2019-09-13 NOTE — PROGRESS NOTES
Nephrology Progress Note  09/13/2019         Assessment & Recommendations:   Nicci Pemberton is a 59 year old with history of ANGULO/alpha-anti trypsin cirrhosis now s/p liver tx on 8/18/2019 . She initially presented on 8/16 from TCU with decompensated ANGULO cirrhosis and had a liver transplant 2 days later. She has primary metabolic alkalosis with compensated respiratory acidosis. She now has hyperkalemia with normal Cr. Plan and recs as below     #. Primary metabolic alkalosis with compensated respiratory acidosis  Initially consulted for bicab of 38, with ABG showing 7.46/53/58. She has alkalemia, with primary metabolic alkalosis (38) and a compensated respiratory acidosis (expected PCO2 45-50). Hypothyroidism may blunt her ventilatory response to appropriately compensate for the metabolic alkalosis and she has started treatment for this. Urine anion gap is positive and this just means that kidneys are working to correct the alkalosis. At this time, Bicarb is the same (36) for the last 3 days, so wou  - Avoid loop diuretics   - Continue daily BMP    #. Non-oliguric GAMAL  Baseline Cr 0.8. Cr 0.96 on 9/12 and now 1.11. Likely pr-renal. She is on ibuprofen 400mg q4hrs since 9/11 and this could be contributing with possible intravascular depletion component.  - Discontinue ibuprofen (avoid NSAIDS)  - Strict I/O's, daily weights  - Avoid nephrotoxic agents  - Continue to trend Cr    #. High-normal K  This is likely 2/2 calcineurin inhibitor (cyclosporine). Other hyperkalemia causes less likely. K is table.   - Trend K  - Discussed with dietician and she will adjust TF to low K diet    #. Other Electrolytes  - Na, Ph, Ca wnl   - K as above    #. Volume status  Appears hypervolemic based on LE edema. Question of whether she could be intravascularly depleted given contraction alkalosis however, her orthostatics were negative. Her urine chloride was 31, when above 20 is a marker for euvolemia.   - monitor  - strict I/O's,  daily weights       Recommendations were communicated to primary team via note.      Seen and discussed with Dr. Ramirez.     Yung Aranda  PGY-2, IM  P: [phone]    Interval History :   Nursing and provider notes from last 24 hours reviewed.  In the last 24 hours Nicci Pemberton is improving and is less confused. No overnights issues.  at bedside this AM too and very supportive. She continues to work with PT and trying to stay positive    Review of Systems:   I reviewed the following systems:  GI: better appetite. no nausea or vomiting or diarrhea.   Neuro:  Some confusion  Constitutional:  no fever or chills  CV: no dyspnea but has LE edema.  no chest pain.    Physical Exam:   I/O last 3 completed shifts:  In: 1550 [P.O.:400; NG/GT:250]  Out: 601 [Urine:600; Other:1]   /77 (BP Location: Left arm)   Pulse 94   Temp 98.6  F (37  C) (Oral)   Resp 18   Wt 89.3 kg (196 lb 14.4 oz)   SpO2 95%   BMI 36.01 kg/m       GENERAL APPEARANCE: Sitting in chair, conversant  EYES:  no scleral icterus, pupils equal  HENT: mouth without ulcers or lesions  PULM: lungs clear to auscultation bilaterally, equal air movement, no clubbing  CV: regular rhythm, normal rate, no rub     -JVD none     -edema +2 LE edema   GI: soft, non-tender, non-distended, bowel sounds are present  NEURO: Alert and oriented to person, place but not year  Access Left PIV    Labs:   All labs reviewed by me  Electrolytes/Renal -   Recent Labs   Lab Test 09/13/19  0549 09/12/19  0557 09/11/19  0614    134 137   POTASSIUM 5.3 5.4* 5.2   CHLORIDE 96 95 95   CO2 36* 36* 36*   BUN 38* 32* 27   CR 1.11* 0.96 0.83   * 113* 126*   JACOB 8.8 8.8 8.8   MAG 1.9 1.9 1.8   PHOS 3.3 3.2 2.8       CBC -   Recent Labs   Lab Test 09/13/19  0549 09/12/19  0557 09/11/19  0614   WBC 5.2 5.5 5.3   HGB 8.0* 7.7* 8.4*    171 179       LFTs -   Recent Labs   Lab Test 09/13/19  0549 09/12/19  0557 09/11/19  0614   ALKPHOS 183* 163* 156*    BILITOTAL 1.5* 1.4* 1.3   ALT 38 36 31   AST 44 37 38   PROTTOTAL 5.4* 5.2* 5.5*   ALBUMIN 2.2* 2.3* 2.4*       Iron Panel -   Recent Labs   Lab Test 02/19/19  0825 06/18/18  1024   IRON 188* 220*   IRONSAT 95* 90*   DREW 825* 996*         Imaging:  None today    Current Medications:    aspirin  325 mg Oral Daily     carboxymethylcellulose PF  2 drop Both Eyes Q6H     clotrimazole  1 Molly Buccal 4x Daily     cycloSPORINE modified  200 mg Oral BID IS     dapsone  100 mg Oral Daily     famotidine  20 mg Oral Daily     heparin  5,000 Units Subcutaneous Q8H     levothyroxine  175 mcg Oral Daily     magnesium oxide  400 mg Oral Daily     melatonin  3 mg Oral At Bedtime     multivitamins w/minerals  15 mL Per Feeding Tube Daily     mycophenolic acid  540 mg Oral BID IS     [START ON 9/14/2019] protein modular  1 packet Per Feeding Tube Daily     sodium chloride  1 spray Both Nostrils Q4H     sodium chloride (PF)  3 mL Intravenous Q8H     sodium chloride (PF)  3 mL Intracatheter Q8H     valGANciclovir  450 mg Oral Daily     vitamin D3  1,000 Units Oral BID       IV fluid REPLACEMENT ONLY       IV fluid REPLACEMENT ONLY Stopped (09/06/19 1730)

## 2019-09-13 NOTE — PLAN OF CARE
/77 (BP Location: Left arm)   Pulse 94   Temp 98.3  F (36.8  C) (Oral)   Resp 18   Wt 89.3 kg (196 lb 14.4 oz)   SpO2 95%   BMI 36.01 kg/m   VS stable on room air. Patient denies pain. Patient denies nausea. BG n/a. Urine Output - voiding spontaneously. Bowel Function - BM today. Nutrition - regular diet with tube feeds running at goal of 50 ml/hr into NJT. Orders to change rate and formula this afternoon when formula arrives. Drains - NJT. Activity - up assist x 1 with walker and gait belt. Education - needing reinforcement d/t confusion. Plan of Care - continue plan of care. Pt A & O x 1-2 (self and location at times). Incision sutured and open to air. Pt showered and worked with pt and ot today. Pt impulsive and needing re-orientation therefore requiring bedside attendent for increased safety.

## 2019-09-13 NOTE — PLAN OF CARE
Patient oriented to self and intermittently to situation, otherwise disoriented. Confused and can get agitated and uncooperative. Seroquel x1 given. Sitter at bedside for safety and pulling at lines. VSS on 1L O2 per NC. Abdominal pain well controlled with PRN Ibuprofen x2 doses.  2gm K+ diet with poor appetite; PO intake encouraged. NJ with TF at 50 mL/hr. Voiding adequate amounts. BM x1 today. PIV SL'd. Incision C/D/I. Up with Ax1 with walker.

## 2019-09-14 ENCOUNTER — APPOINTMENT (OUTPATIENT)
Dept: OCCUPATIONAL THERAPY | Facility: CLINIC | Age: 59
DRG: 005 | End: 2019-09-14
Payer: COMMERCIAL

## 2019-09-14 ENCOUNTER — APPOINTMENT (OUTPATIENT)
Dept: PHYSICAL THERAPY | Facility: CLINIC | Age: 59
DRG: 005 | End: 2019-09-14
Payer: COMMERCIAL

## 2019-09-14 LAB
ALBUMIN SERPL-MCNC: 2.2 G/DL (ref 3.4–5)
ALP SERPL-CCNC: 186 U/L (ref 40–150)
ALT SERPL W P-5'-P-CCNC: 42 U/L (ref 0–50)
ANION GAP SERPL CALCULATED.3IONS-SCNC: 10 MMOL/L (ref 3–14)
ANISOCYTOSIS BLD QL SMEAR: ABNORMAL
AST SERPL W P-5'-P-CCNC: 47 U/L (ref 0–45)
BASOPHILS # BLD AUTO: 0 10E9/L (ref 0–0.2)
BASOPHILS NFR BLD AUTO: 0 %
BILIRUB DIRECT SERPL-MCNC: 1 MG/DL (ref 0–0.2)
BILIRUB SERPL-MCNC: 1.6 MG/DL (ref 0.2–1.3)
BUN SERPL-MCNC: 32 MG/DL (ref 7–30)
CALCIUM SERPL-MCNC: 8.8 MG/DL (ref 8.5–10.1)
CHLORIDE SERPL-SCNC: 96 MMOL/L (ref 94–109)
CO2 SERPL-SCNC: 32 MMOL/L (ref 20–32)
CREAT SERPL-MCNC: 0.96 MG/DL (ref 0.52–1.04)
CYCLOSPORINE BLD LC/MS/MS-MCNC: 190 UG/L (ref 50–400)
DIFFERENTIAL METHOD BLD: ABNORMAL
EOSINOPHIL # BLD AUTO: 0.6 10E9/L (ref 0–0.7)
EOSINOPHIL NFR BLD AUTO: 9 %
ERYTHROCYTE [DISTWIDTH] IN BLOOD BY AUTOMATED COUNT: 18.7 % (ref 10–15)
GFR SERPL CREATININE-BSD FRML MDRD: 64 ML/MIN/{1.73_M2}
GLUCOSE SERPL-MCNC: 105 MG/DL (ref 70–99)
HCT VFR BLD AUTO: 25.2 % (ref 35–47)
HGB BLD-MCNC: 7.6 G/DL (ref 11.7–15.7)
LYMPHOCYTES # BLD AUTO: 0.1 10E9/L (ref 0.8–5.3)
LYMPHOCYTES NFR BLD AUTO: 1.8 %
MACROCYTES BLD QL SMEAR: PRESENT
MAGNESIUM SERPL-MCNC: 1.9 MG/DL (ref 1.6–2.3)
MCH RBC QN AUTO: 30.5 PG (ref 26.5–33)
MCHC RBC AUTO-ENTMCNC: 30.2 G/DL (ref 31.5–36.5)
MCV RBC AUTO: 101 FL (ref 78–100)
METAMYELOCYTES # BLD: 0.1 10E9/L
METAMYELOCYTES NFR BLD MANUAL: 1.8 %
MONOCYTES # BLD AUTO: 0.5 10E9/L (ref 0–1.3)
MONOCYTES NFR BLD AUTO: 8.1 %
MYELOCYTES # BLD: 0.1 10E9/L
MYELOCYTES NFR BLD MANUAL: 0.9 %
NEUTROPHILS # BLD AUTO: 4.9 10E9/L (ref 1.6–8.3)
NEUTROPHILS NFR BLD AUTO: 78.4 %
NRBC # BLD AUTO: 0.1 10*3/UL
NRBC BLD AUTO-RTO: 1 /100
PHOSPHATE SERPL-MCNC: 4.6 MG/DL (ref 2.5–4.5)
PLATELET # BLD AUTO: 171 10E9/L (ref 150–450)
PLATELET # BLD EST: ABNORMAL 10*3/UL
POIKILOCYTOSIS BLD QL SMEAR: SLIGHT
POTASSIUM SERPL-SCNC: 4.7 MMOL/L (ref 3.4–5.3)
PROT SERPL-MCNC: 5.4 G/DL (ref 6.8–8.8)
RBC # BLD AUTO: 2.49 10E12/L (ref 3.8–5.2)
SODIUM SERPL-SCNC: 138 MMOL/L (ref 133–144)
TME LAST DOSE: NORMAL H
WBC # BLD AUTO: 6.2 10E9/L (ref 4–11)

## 2019-09-14 PROCEDURE — 36415 COLL VENOUS BLD VENIPUNCTURE: CPT | Performed by: TRANSPLANT SURGERY

## 2019-09-14 PROCEDURE — 27210432 ZZH NUTRITION PRODUCT RENAL BASIC LITER

## 2019-09-14 PROCEDURE — 25000131 ZZH RX MED GY IP 250 OP 636 PS 637: Performed by: PHYSICIAN ASSISTANT

## 2019-09-14 PROCEDURE — 25000132 ZZH RX MED GY IP 250 OP 250 PS 637: Performed by: PHYSICIAN ASSISTANT

## 2019-09-14 PROCEDURE — 83735 ASSAY OF MAGNESIUM: CPT | Performed by: TRANSPLANT SURGERY

## 2019-09-14 PROCEDURE — 80158 DRUG ASSAY CYCLOSPORINE: CPT | Performed by: PHYSICIAN ASSISTANT

## 2019-09-14 PROCEDURE — 84100 ASSAY OF PHOSPHORUS: CPT | Performed by: TRANSPLANT SURGERY

## 2019-09-14 PROCEDURE — 40000894 ZZH STATISTIC OT IP EVAL DEFER: Performed by: OCCUPATIONAL THERAPIST

## 2019-09-14 PROCEDURE — 97530 THERAPEUTIC ACTIVITIES: CPT | Mod: GP

## 2019-09-14 PROCEDURE — 25000132 ZZH RX MED GY IP 250 OP 250 PS 637: Performed by: TRANSPLANT SURGERY

## 2019-09-14 PROCEDURE — 80076 HEPATIC FUNCTION PANEL: CPT | Performed by: TRANSPLANT SURGERY

## 2019-09-14 PROCEDURE — 25000128 H RX IP 250 OP 636: Performed by: PHYSICIAN ASSISTANT

## 2019-09-14 PROCEDURE — 12000026 ZZH R&B TRANSPLANT

## 2019-09-14 PROCEDURE — 25000132 ZZH RX MED GY IP 250 OP 250 PS 637: Performed by: SURGERY

## 2019-09-14 PROCEDURE — 85025 COMPLETE CBC W/AUTO DIFF WBC: CPT | Performed by: TRANSPLANT SURGERY

## 2019-09-14 PROCEDURE — 25000125 ZZHC RX 250: Performed by: PHYSICIAN ASSISTANT

## 2019-09-14 PROCEDURE — 97535 SELF CARE MNGMENT TRAINING: CPT | Mod: GO | Performed by: OCCUPATIONAL THERAPIST

## 2019-09-14 PROCEDURE — 80053 COMPREHEN METABOLIC PANEL: CPT | Performed by: TRANSPLANT SURGERY

## 2019-09-14 PROCEDURE — 82248 BILIRUBIN DIRECT: CPT | Performed by: TRANSPLANT SURGERY

## 2019-09-14 RX ORDER — DAPSONE 25 MG/1
100 TABLET ORAL DAILY
Status: DISCONTINUED | OUTPATIENT
Start: 2019-09-15 | End: 2019-09-23 | Stop reason: HOSPADM

## 2019-09-14 RX ADMIN — MELATONIN 1000 UNITS: at 09:12

## 2019-09-14 RX ADMIN — MYCOPHENOLIC ACID 540 MG: 360 TABLET, DELAYED RELEASE ORAL at 18:03

## 2019-09-14 RX ADMIN — VALGANCICLOVIR 450 MG: 450 TABLET, FILM COATED ORAL at 09:12

## 2019-09-14 RX ADMIN — CYCLOSPORINE 200 MG: 100 SOLUTION ORAL at 18:03

## 2019-09-14 RX ADMIN — Medication 5000 UNITS: at 04:01

## 2019-09-14 RX ADMIN — Medication 1 PACKET: at 09:13

## 2019-09-14 RX ADMIN — FAMOTIDINE 20 MG: 20 TABLET ORAL at 09:12

## 2019-09-14 RX ADMIN — Medication 5000 UNITS: at 13:34

## 2019-09-14 RX ADMIN — Medication 2 DROP: at 09:12

## 2019-09-14 RX ADMIN — Medication 5000 UNITS: at 19:35

## 2019-09-14 RX ADMIN — MELATONIN 1000 UNITS: at 19:35

## 2019-09-14 RX ADMIN — DAPSONE 100 MG: 100 TABLET ORAL at 09:12

## 2019-09-14 RX ADMIN — MULTIVITAMIN 15 ML: LIQUID ORAL at 09:12

## 2019-09-14 RX ADMIN — CYCLOSPORINE 200 MG: 100 SOLUTION ORAL at 09:12

## 2019-09-14 RX ADMIN — Medication 2 DROP: at 19:35

## 2019-09-14 RX ADMIN — Medication 2 DROP: at 04:01

## 2019-09-14 RX ADMIN — MELATONIN TAB 3 MG 3 MG: 3 TAB at 19:35

## 2019-09-14 RX ADMIN — MAGNESIUM OXIDE TAB 400 MG (241.3 MG ELEMENTAL MG) 400 MG: 400 (241.3 MG) TAB at 13:33

## 2019-09-14 RX ADMIN — MYCOPHENOLIC ACID 540 MG: 360 TABLET, DELAYED RELEASE ORAL at 09:12

## 2019-09-14 RX ADMIN — ASPIRIN 325 MG ORAL TABLET 325 MG: 325 PILL ORAL at 09:12

## 2019-09-14 RX ADMIN — LEVOTHYROXINE SODIUM 175 MCG: 175 TABLET ORAL at 09:12

## 2019-09-14 ASSESSMENT — ACTIVITIES OF DAILY LIVING (ADL)
ADLS_ACUITY_SCORE: 22
ADLS_ACUITY_SCORE: 22
ADLS_ACUITY_SCORE: 21
ADLS_ACUITY_SCORE: 22

## 2019-09-14 NOTE — PLAN OF CARE
"/70 (BP Location: Left arm)   Pulse 94   Temp 98.4  F (36.9  C) (Oral)   Resp 18   Wt 89.3 kg (196 lb 14.4 oz)   SpO2 94%   BMI 36.01 kg/m      [id number]: Pt admitted for liver txp, POD# 26, d/t ANGULO. AVSS on RA ex tachy in low 100s.  visiting this evening.  Neuro: Was able to answer all orientation questions this evening, but was not interacting in other situations appropriately. For example, after asked the date, she said she \"was going to call her  tomorrow because writer was harassing her\" (d/t being asked numerous orientation questions) and sitter reported that when given her toothbrush to brush her teeth, she put toothbrush in her ear. Continues to be impulsive, needing frequent reminders to not pull her lines and wait for help with mobility.  Cardiac: WDL ex slightly tachy, in low 100s-110s. Orthostatic BPs WDL.  Resp: WDL, encourage IS use.   GI/: Last BM today. Voiding adequate amounts via BSC.   Diet/Appetite: 2 g K diet with poor appetite. Needs assistance eating. Took meds with Boost juice and drank ~75% one carton. Ate ~10% of dinner. On continuous TFs via NJ, changed to Nepro @ 45/hr this evening with Q4 30 ml H2O flushes.   Endocrine: NA.  Skin: Clamshell incision sutured, SILVIA, CDI. Sutures should come out tomorrow. Bruising throughout body.   Access: PIV x1 SLd.    Drains: NA.  Activity: Up A1 + W. Got up in chair this evening.   Pain: Denies.   Plan: Continue trending liver labs, encourage PO intake, improve mentation. Will continue with plan of care and notify team of any changes.?  "

## 2019-09-14 NOTE — PROGRESS NOTES
Transplant Surgery  Inpatient Daily Progress Note  09/14/2019    Assessment & Plan: Nicci Pemberton is a 58 yo female with a past medical history significant for end stage liver disease 2/2 ANGULO and alpha 1 anti-trypsin deficiency now s/p DD OLT on 8/18/19.     Graft function: DD OLT 8/18/19 without stent-POD #26  Transaminases, AP stable. Tbili uptrending, 1.5 to 1.6 today. Consult Panc/bili team for possible ERCP-will continue to trend at this time, but if she continues to rise, will organize ERCP    Immunosuppression management:   Tac - stopped due to prolonged delirium,   CSA started 9/2. Continue 200 mg BID to titrate to a goal of 200-300. 9/14 level 190, will continue current dose. MMF increased to 1000 mg BID during transition of CNI. Due to nausea,  changed to Myfortic 540 mg BID.   Pred 5mg when transitioning IS meds, now discontinued.   Complexity of management: Medium. Contributing factors: anemia and induction , encephalopathy.     Hematology: Acute blood loss anemia. Transfusion goal hgb > 7. Hgb stable, 7.6 today. Last transfused 3 units 8/20 and 1 unit 9/8.     HEENT: Epistaxis: due to friable mucosa, managed with packing, now resolved.     Cardiorespiratory: Patient extubated 8/24. RA. Encourage IS and ambulation.     Neuro:   Toxic and metabolic encephalopathy likely secondary to poor sleep, drugs (tac, pain medication), acute illness. Ordered melatonin and bedtime seroquel to help patient sleep, patient's mental status has seemed to be improving overall, but waxes/wanes.   -MRI head 9/3- results unremarkable. Discontinued Tacrolimus. Consulted neurology, recommended LP to rule out infectious etiology and Video EEG monitoring completed, no evidence of seizures.    9/5 LP, results thus far negative for infection. WBC 1. Protein 44 and glucose 51. Negative CSF studies: cryptococcal, enterovirus, HSV, VZV.    -VBG reviewed. Avoid medications that cause respiratory depression.   -Continue scheduled  melatonin and Seroquel at bedtime.   -Continue bedside sitter due to pulling at tubes.   -work on day/night cycles     GI/Nutrition: Regular diet, NJ in place. Continue boost. Continue EN-Change to Nepro.     Endocrine: Steroid induced hyperglycemia, resolved    Fluid/Electrolytes:   GAMAL- CRRT stopped and patient has required no further HD. Incontinent of urine, so difficult to quantify, but Cr now normalized. SCr 0.96 today  Metabolic alkalosis, with compensated respiratory acidosis: CO2  30's. Nephrology following.    avoiding loop diuretics, obtain urine lytes.   Hyperkalemia: K 5.3 to 4.7-will change EN to Nepro.   recommeded.     : Incontinent    Infectious disease: AF, WBC WNL. Ppx with micafungin x 10 days completed, zosyn completed. PPx with dapsone and valcyte.    Prophylaxis: Heparin subcutaneous TID.     Activity: OOB to chair today. PT/OT.     Disposition: 7A     Medical Decision Making: Medium  Subsequent visit 02368 (moderate level decision making)    VEENA/Fellow/Resident Provider: Shari Pruett MD      Faculty: Selena Howard MD    ____________________________________________________________  Transplant History: Admitted 8/16/2019 for acute decompensated ANGULO.   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    8/18/2019 (Liver), Postoperative day: 27     Interval History:   No acute events this AM. Mental status continues to wax and wane, tolerated some boost this morning, which she liked. Reports occasional nausea, no vomiting. Does endorse some back pain.    ROS:   A 10-point review of systems was negative except as noted above.    Curent Meds:    aspirin  325 mg Oral Daily     carboxymethylcellulose PF  2 drop Both Eyes Q6H     clotrimazole  1 Molly Buccal 4x Daily     cycloSPORINE modified  200 mg Oral BID IS     [START ON 9/15/2019] dapsone  100 mg Oral Daily     famotidine  20 mg Oral Daily     heparin  5,000 Units Subcutaneous Q8H     levothyroxine  175  mcg Oral Daily     magnesium oxide  400 mg Oral Daily     melatonin  3 mg Oral At Bedtime     multivitamins w/minerals  15 mL Per Feeding Tube Daily     mycophenolic acid  540 mg Oral BID IS     protein modular  1 packet Per Feeding Tube Daily     sodium chloride  1 spray Both Nostrils Q4H     sodium chloride (PF)  3 mL Intravenous Q8H     sodium chloride (PF)  3 mL Intracatheter Q8H     valGANciclovir  450 mg Oral Daily     vitamin D3  1,000 Units Oral BID       Physical Exam:     Admit Weight: 104.3 kg (230 lb)    Current Vitals:   /80 (BP Location: Left arm)   Pulse 94   Temp 99  F (37.2  C) (Axillary)   Resp 18   Wt 89.3 kg (196 lb 14.4 oz)   SpO2 90%   BMI 36.01 kg/m      Vital sign ranges:    Temp:  [98.1  F (36.7  C)-99  F (37.2  C)] 99  F (37.2  C)  Heart Rate:  [] 103  Resp:  [16-18] 18  BP: (108-113)/(67-80) 113/80  FiO2 (%):  [93 %] 93 %  SpO2:  [90 %-94 %] 90 %  Patient Vitals for the past 24 hrs:   BP Temp Temp src Heart Rate Resp SpO2   09/14/19 1225 113/80 99  F (37.2  C) Axillary 103 18 90 %   09/14/19 0800 109/67 98.8  F (37.1  C) Oral 103 16 91 %   09/14/19 0439 109/71 98.1  F (36.7  C) Oral 98 18 --   09/14/19 0004 109/73 98.3  F (36.8  C) Oral 112 18 93 %   09/13/19 2000 108/70 98.4  F (36.9  C) Oral 98 18 94 %   09/13/19 1533 -- 98.6  F (37  C) Oral -- 18 --     General Appearance: NAD, lying comfortably in bed  HEENT: Feeding tube in place, tolerating TFs  Skin: normal, warm, scattered bruising  Heart: RRR  Lungs: NLB on RA, no audible wheeze  Abdomen: soft, nondistended, incision sutured, c/d/i. Small pustule to right lateral superior portion of incision. Dressing over prior drain site clean and intact, no drainage.  : Wearing depends  Extremities: edema: present bilaterally. 2+. Wearing lymphedema wraps.  Neurologic: alert, oriented x1. Pleasant      Data:   CMP  Recent Labs   Lab 09/14/19  0617 09/13/19  0549    135   POTASSIUM 4.7 5.3   CHLORIDE 96 96   CO2 32 36*    * 103*   BUN 32* 38*   CR 0.96 1.11*   GFRESTIMATED 64 54*   GFRESTBLACK 75 63   JACOB 8.8 8.8   MAG 1.9 1.9   PHOS 4.6* 3.3   ALBUMIN 2.2* 2.2*   BILITOTAL 1.6* 1.5*   ALKPHOS 186* 183*   AST 47* 44   ALT 42 38     CBC  Recent Labs   Lab 09/14/19  0617 09/13/19  0549   HGB 7.6* 8.0*   WBC 6.2 5.2    164         COAGS  No lab results found in last 7 days.    Invalid input(s): XA

## 2019-09-14 NOTE — PLAN OF CARE
Discharge Planner OT   Patient plan for discharge: not discussed  Current status: Pt sit<>stand: mIN A with VCs. Pt ambulated to/from bathroom w/FWW and CGA and occasional physical assist to maneuver walker around objects in room. Pt stood at sink with CGA for oral hygiene requiring Total assist to apply toothpaste to toothbrush due to cognitive deficits. Toilet transfer: CGA with verbal and tactile cues, MOD A for jessi area hygiene for thoroughness.  Barriers to return to prior living situation: cognition, decreased strength and activity tolerance, post surgical precautions  Recommendations for discharge: ARU  Rationale for recommendations: Pt is below baseline functioning and will benefit from continued skilled therapy to maximize independence and safety. Pt has multiple therapy needs and would tolerate 3 hours of therapy per day.       Entered by: Andressa Irby 09/14/2019 3:48 PM

## 2019-09-14 NOTE — PLAN OF CARE
AVSS and denies pain.  Patient continues to be confused, often having a difficult processing directions, and attendant at bedside for safety.  Patient up with assist of one, gait belt, and walker, voiding, passing flatus, bowel movement x1, poor oral intake, and tube feeds continue at 45ml/hr via NJ.  Scheduled meds given as ordered.  Continue to monitor, treat as ordered, notify team with changes.

## 2019-09-14 NOTE — PLAN OF CARE
[id number]  /71 (BP Location: Left arm)   Pulse 94   Temp 98.1  F (36.7  C) (Oral)   Resp 18   Wt 89.3 kg (196 lb 14.4 oz)   SpO2 93%   BMI 36.01 kg/m      Dx: DDLT on 8/18 d/t ANGULO   Hx: ESRD 2/2, HTN, Hypothyroidism   VS: Tachycardic 112, it went down to 98  Neuro: Disoriented to time and situation, wanting to call her brother throughout the night.   BG: None  Labs: Labs K+ 5.3, total Bili 1.5. cr 1.11  Pain/ Nausea/ PRNs: Denies pain and nausea   Diet: 2G K+ diet, poor appetite, carb count. FT Nepro via N/J running @ 45cc/hr   LDA: R PIV SL'd and patent   GI/: Voiding spontaneously. Last BM today.   Skin: Clamshell incision approximated with sutures, to be removed sometime today. Lymph wraps on BLE  Mobility: A-1 with Walker.   Has a sitter by the bed side.

## 2019-09-15 ENCOUNTER — APPOINTMENT (OUTPATIENT)
Dept: OCCUPATIONAL THERAPY | Facility: CLINIC | Age: 59
DRG: 005 | End: 2019-09-15
Payer: COMMERCIAL

## 2019-09-15 LAB
ALBUMIN SERPL-MCNC: 2.2 G/DL (ref 3.4–5)
ALP SERPL-CCNC: 187 U/L (ref 40–150)
ALT SERPL W P-5'-P-CCNC: 44 U/L (ref 0–50)
ANION GAP SERPL CALCULATED.3IONS-SCNC: 8 MMOL/L (ref 3–14)
ANISOCYTOSIS BLD QL SMEAR: ABNORMAL
AST SERPL W P-5'-P-CCNC: 44 U/L (ref 0–45)
BASOPHILS # BLD AUTO: 0 10E9/L (ref 0–0.2)
BASOPHILS NFR BLD AUTO: 0 %
BILIRUB DIRECT SERPL-MCNC: 0.9 MG/DL (ref 0–0.2)
BILIRUB SERPL-MCNC: 1.7 MG/DL (ref 0.2–1.3)
BUN SERPL-MCNC: 29 MG/DL (ref 7–30)
CALCIUM SERPL-MCNC: 8.6 MG/DL (ref 8.5–10.1)
CHLORIDE SERPL-SCNC: 97 MMOL/L (ref 94–109)
CO2 SERPL-SCNC: 32 MMOL/L (ref 20–32)
CREAT SERPL-MCNC: 0.86 MG/DL (ref 0.52–1.04)
CYCLOSPORINE BLD LC/MS/MS-MCNC: 197 UG/L (ref 50–400)
DIFFERENTIAL METHOD BLD: ABNORMAL
EOSINOPHIL # BLD AUTO: 0.4 10E9/L (ref 0–0.7)
EOSINOPHIL NFR BLD AUTO: 7 %
ERYTHROCYTE [DISTWIDTH] IN BLOOD BY AUTOMATED COUNT: 19 % (ref 10–15)
GFR SERPL CREATININE-BSD FRML MDRD: 74 ML/MIN/{1.73_M2}
GLUCOSE SERPL-MCNC: 104 MG/DL (ref 70–99)
HCT VFR BLD AUTO: 24.7 % (ref 35–47)
HGB BLD-MCNC: 7.2 G/DL (ref 11.7–15.7)
LYMPHOCYTES # BLD AUTO: 0.1 10E9/L (ref 0.8–5.3)
LYMPHOCYTES NFR BLD AUTO: 0.9 %
MACROCYTES BLD QL SMEAR: PRESENT
MAGNESIUM SERPL-MCNC: 1.8 MG/DL (ref 1.6–2.3)
MCH RBC QN AUTO: 29.8 PG (ref 26.5–33)
MCHC RBC AUTO-ENTMCNC: 29.1 G/DL (ref 31.5–36.5)
MCV RBC AUTO: 102 FL (ref 78–100)
MONOCYTES # BLD AUTO: 0.7 10E9/L (ref 0–1.3)
MONOCYTES NFR BLD AUTO: 12.2 %
NEUTROPHILS # BLD AUTO: 4.4 10E9/L (ref 1.6–8.3)
NEUTROPHILS NFR BLD AUTO: 79 %
PHOSPHATE SERPL-MCNC: 4.6 MG/DL (ref 2.5–4.5)
PLATELET # BLD AUTO: 177 10E9/L (ref 150–450)
PLATELET # BLD EST: ABNORMAL 10*3/UL
POIKILOCYTOSIS BLD QL SMEAR: SLIGHT
POTASSIUM SERPL-SCNC: 4.3 MMOL/L (ref 3.4–5.3)
PROMYELOCYTES # BLD MANUAL: 0.1 10E9/L
PROMYELOCYTES NFR BLD MANUAL: 0.9 %
PROT SERPL-MCNC: 5.5 G/DL (ref 6.8–8.8)
RBC # BLD AUTO: 2.42 10E12/L (ref 3.8–5.2)
SODIUM SERPL-SCNC: 137 MMOL/L (ref 133–144)
TME LAST DOSE: NORMAL H
WBC # BLD AUTO: 5.6 10E9/L (ref 4–11)

## 2019-09-15 PROCEDURE — 25000132 ZZH RX MED GY IP 250 OP 250 PS 637: Performed by: PHYSICIAN ASSISTANT

## 2019-09-15 PROCEDURE — 80158 DRUG ASSAY CYCLOSPORINE: CPT | Performed by: PHYSICIAN ASSISTANT

## 2019-09-15 PROCEDURE — 40000141 ZZH STATISTIC PERIPHERAL IV START W/O US GUIDANCE

## 2019-09-15 PROCEDURE — 12000026 ZZH R&B TRANSPLANT

## 2019-09-15 PROCEDURE — 82248 BILIRUBIN DIRECT: CPT | Performed by: TRANSPLANT SURGERY

## 2019-09-15 PROCEDURE — 25000128 H RX IP 250 OP 636: Performed by: PHYSICIAN ASSISTANT

## 2019-09-15 PROCEDURE — 25000132 ZZH RX MED GY IP 250 OP 250 PS 637

## 2019-09-15 PROCEDURE — 36415 COLL VENOUS BLD VENIPUNCTURE: CPT | Performed by: TRANSPLANT SURGERY

## 2019-09-15 PROCEDURE — 25000132 ZZH RX MED GY IP 250 OP 250 PS 637: Performed by: SURGERY

## 2019-09-15 PROCEDURE — 25800030 ZZH RX IP 258 OP 636

## 2019-09-15 PROCEDURE — 25000132 ZZH RX MED GY IP 250 OP 250 PS 637: Performed by: TRANSPLANT SURGERY

## 2019-09-15 PROCEDURE — 85025 COMPLETE CBC W/AUTO DIFF WBC: CPT | Performed by: TRANSPLANT SURGERY

## 2019-09-15 PROCEDURE — 27210432 ZZH NUTRITION PRODUCT RENAL BASIC LITER

## 2019-09-15 PROCEDURE — 25000131 ZZH RX MED GY IP 250 OP 636 PS 637: Performed by: NURSE PRACTITIONER

## 2019-09-15 PROCEDURE — 84100 ASSAY OF PHOSPHORUS: CPT | Performed by: TRANSPLANT SURGERY

## 2019-09-15 PROCEDURE — 25000131 ZZH RX MED GY IP 250 OP 636 PS 637: Performed by: PHYSICIAN ASSISTANT

## 2019-09-15 PROCEDURE — 80053 COMPREHEN METABOLIC PANEL: CPT | Performed by: TRANSPLANT SURGERY

## 2019-09-15 PROCEDURE — 40000802 ZZH SITE CHECK

## 2019-09-15 PROCEDURE — 83735 ASSAY OF MAGNESIUM: CPT | Performed by: TRANSPLANT SURGERY

## 2019-09-15 PROCEDURE — 97140 MANUAL THERAPY 1/> REGIONS: CPT | Mod: GO

## 2019-09-15 RX ORDER — CYCLOSPORINE 100 MG/ML
225 SOLUTION ORAL
Status: DISCONTINUED | OUTPATIENT
Start: 2019-09-15 | End: 2019-09-23 | Stop reason: HOSPADM

## 2019-09-15 RX ORDER — SODIUM CHLORIDE 9 MG/ML
INJECTION, SOLUTION INTRAVENOUS
Status: COMPLETED
Start: 2019-09-15 | End: 2019-09-15

## 2019-09-15 RX ADMIN — Medication 5000 UNITS: at 03:47

## 2019-09-15 RX ADMIN — SALINE NASAL SPRAY 1 SPRAY: 1.5 SOLUTION NASAL at 03:50

## 2019-09-15 RX ADMIN — ASPIRIN 325 MG ORAL TABLET 325 MG: 325 PILL ORAL at 09:07

## 2019-09-15 RX ADMIN — CLOTRIMAZOLE 1 TROCHE: 10 LOZENGE ORAL at 09:06

## 2019-09-15 RX ADMIN — SALINE NASAL SPRAY 1 SPRAY: 1.5 SOLUTION NASAL at 09:08

## 2019-09-15 RX ADMIN — LIDOCAINE: 50 OINTMENT TOPICAL at 04:59

## 2019-09-15 RX ADMIN — MAGNESIUM OXIDE TAB 400 MG (241.3 MG ELEMENTAL MG) 400 MG: 400 (241.3 MG) TAB at 11:11

## 2019-09-15 RX ADMIN — FAMOTIDINE 20 MG: 20 TABLET ORAL at 09:06

## 2019-09-15 RX ADMIN — MELATONIN TAB 3 MG 3 MG: 3 TAB at 20:35

## 2019-09-15 RX ADMIN — CYCLOSPORINE 225 MG: 100 SOLUTION ORAL at 18:38

## 2019-09-15 RX ADMIN — LEVOTHYROXINE SODIUM 175 MCG: 175 TABLET ORAL at 09:06

## 2019-09-15 RX ADMIN — MELATONIN 1000 UNITS: at 09:06

## 2019-09-15 RX ADMIN — Medication 250 ML: at 18:35

## 2019-09-15 RX ADMIN — CYCLOSPORINE 200 MG: 100 SOLUTION ORAL at 09:06

## 2019-09-15 RX ADMIN — VALGANCICLOVIR 450 MG: 450 TABLET, FILM COATED ORAL at 09:08

## 2019-09-15 RX ADMIN — MELATONIN 1000 UNITS: at 20:35

## 2019-09-15 RX ADMIN — Medication 2 DROP: at 09:22

## 2019-09-15 RX ADMIN — DAPSONE 100 MG: 25 TABLET ORAL at 09:07

## 2019-09-15 RX ADMIN — MYCOPHENOLIC ACID 540 MG: 360 TABLET, DELAYED RELEASE ORAL at 18:38

## 2019-09-15 RX ADMIN — SODIUM CHLORIDE 250 ML: 9 INJECTION, SOLUTION INTRAVENOUS at 18:35

## 2019-09-15 RX ADMIN — Medication 1 PACKET: at 09:06

## 2019-09-15 RX ADMIN — Medication 5000 UNITS: at 11:11

## 2019-09-15 RX ADMIN — MULTIVITAMIN 15 ML: LIQUID ORAL at 09:06

## 2019-09-15 RX ADMIN — MYCOPHENOLIC ACID 540 MG: 360 TABLET, DELAYED RELEASE ORAL at 09:06

## 2019-09-15 RX ADMIN — LIDOCAINE: 50 OINTMENT TOPICAL at 00:48

## 2019-09-15 RX ADMIN — Medication 2 DROP: at 03:47

## 2019-09-15 RX ADMIN — Medication 5000 UNITS: at 20:35

## 2019-09-15 ASSESSMENT — ACTIVITIES OF DAILY LIVING (ADL)
ADLS_ACUITY_SCORE: 22
ADLS_ACUITY_SCORE: 21
ADLS_ACUITY_SCORE: 22
ADLS_ACUITY_SCORE: 21
ADLS_ACUITY_SCORE: 21
ADLS_ACUITY_SCORE: 22

## 2019-09-15 NOTE — PLAN OF CARE
/72 (BP Location: Left arm)   Pulse 94   Temp 98.8  F (37.1  C) (Oral)   Resp 18   Wt 89.3 kg (196 lb 14.4 oz)   SpO2 92%   BMI 36.01 kg/m      [id number]: Pt admitted for liver txp, POD# 27, d/t ANGULO. AVSS on RA ex tachy in low 100s.  and mother visiting this evening.  Neuro: Pt would intermittently answer orientation questions, only correct part of the time. Continues to be impulsive, needing frequent reminders to not pull her lines and wait for help with mobility.  Cardiac: WDL ex slightly tachy, in low 100s.  Resp: WDL, encourage IS use.   GI/: Last BM today. Voiding adequate amounts via BSC.   Diet/Appetite: 2 g K diet with poor appetite. Needs assistance eating. Took meds with Boost juice and drank ~50% one carton. Ate ~10% of dinner. On continuous TFs via NJ @ 45/hr with Q4 30 ml H2O flushes.   Endocrine: NA.  Skin: Clamshell incision sutured, SILVIA, CDI. L OBED site sutured, CDI, dressing changed this evening. Bruising throughout body.   Access: PIV x1 SLd.    Drains: NA.  Activity: Up A1 + W. Got up in chair this evening.   Pain: Denies.   Plan: Continue trending liver labs (which are slowly trending up), encourage PO intake, improve mentation. Will continue with plan of care and notify team of any changes.?

## 2019-09-15 NOTE — PROGRESS NOTES
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     Graft function: DD OLT 8/18/19 without stent-POD #28  Transaminases, AP stable. Tbili uptrending, 1.7(1.6).  Panc/bili team following for possible ERCP-will continue to trend.     Immunosuppression management:   Tac - stopped due to prolonged delirium,   CSA started 9/2.  mg BID to titrate to a goal of 200-300. 9/15 level 197-increase to 225mg BID.   MMF increased to 1000 mg BID during transition of CNI. Due to nausea,  changed to Myfortic 540 mg BID.   Pred 5mg when transitioning IS meds, now discontinued.   Complexity of management: Medium. Contributing factors: anemia and inductionencephalopathy.     Hematology: Acute blood loss anemia. Transfusion goal hgb > 7. Hgb stable, 7.2 today. Last transfused 3 units 8/20 and 1 unit 9/8.     HEENT: Epistaxis: due to friable mucosa, managed with packing, now resolved.     Cardiorespiratory: Patient extubated 8/24. RA. Encourage IS and ambulation.     Neuro:   Toxic and metabolic encephalopathy likely secondary to poor sleep, drugs (tac, pain medication), acute illness. Ordered melatonin and bedtime seroquel to help patient sleep, patient's mental status has seemed to be improving overall, but waxes/wanes.   -MRI head 9/3- results unremarkable. Discontinued Tacrolimus. Consulted neurology, recommended LP to rule out infectious etiology and Video EEG monitoring completed, no evidence of seizures.    9/5 LP, results thus far negative for infection. WBC 1. Protein 44 and glucose 51. Negative CSF studies: cryptococcal, enterovirus, HSV, VZV.    -VBG reviewed. Avoid medications that cause respiratory depression.   -Continue scheduled melatonin and Seroquel at bedtime.   -Continue bedside sitter due to pulling at  tubes.   -work on day/night cycles     GI/Nutrition: Regular diet, NJ in place. Continue EN-Change to Nepro.     Endocrine: Steroid induced hyperglycemia, resolved    Fluid/Electrolytes:   GAMAL- CRRT stopped and patient has required no further HD. Incontinent of urine, so difficult to quantify, but Cr now normalized. SCr 1.1.   Metabolic alkalosis, with compensated respiratory acidosis: CO2  30's. Nephrology following.    avoiding loop diuretics, obtain urine lytes.   Hyperkalemia: K 4.3-improved after transitioning to Nepro EN.    : Incontinent    Infectious disease: AF, WBC WNL. Ppx with micafungin x 10 days completed, zosyn completed. PPx with dapsone and valcyte.    Prophylaxis: Heparin subcutaneous TID.     Activity: OOB to chair today. PT/OT.     Disposition: 7A     Medical Decision Making: Medium  Subsequent visit 09067 (moderate level decision making)    VEENA/Fellow/Resident Provider: Yvette Reich NP      Faculty: Selena Howard MD    ____________________________________________________________  Transplant History: Admitted 8/16/2019 for acute decompensated ANGULO.   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    8/18/2019 (Liver), Postoperative day: 28     Interval History:   More alert this AM. Mentation continues to wax and wane. Poor po intake. No c/o's    ROS:   A 10-point review of systems was negative except as noted above.    Curent Meds:    aspirin  325 mg Oral Daily     carboxymethylcellulose PF  2 drop Both Eyes Q6H     clotrimazole  1 Molly Buccal 4x Daily     cycloSPORINE modified  200 mg Oral BID IS     dapsone  100 mg Oral Daily     famotidine  20 mg Oral Daily     heparin  5,000 Units Subcutaneous Q8H     levothyroxine  175 mcg Oral Daily     magnesium oxide  400 mg Oral Daily     melatonin  3 mg Oral At Bedtime     multivitamins w/minerals  15 mL Per Feeding Tube Daily     mycophenolic acid  540 mg Oral BID IS     protein modular  1 packet Per  Feeding Tube Daily     sodium chloride  1 spray Both Nostrils Q4H     sodium chloride (PF)  3 mL Intravenous Q8H     sodium chloride (PF)  3 mL Intracatheter Q8H     valGANciclovir  450 mg Oral Daily     vitamin D3  1,000 Units Oral BID       Physical Exam:     Admit Weight: 104.3 kg (230 lb)    Current Vitals:   /72 (BP Location: Right arm)   Pulse 94   Temp 98.1  F (36.7  C) (Oral)   Resp 18   Wt 87.6 kg (193 lb 3.2 oz)   SpO2 93%   BMI 35.34 kg/m      Vital sign ranges:    Temp:  [98  F (36.7  C)-99.1  F (37.3  C)] 98.1  F (36.7  C)  Heart Rate:  [] 96  Resp:  [16-18] 18  BP: (102-117)/(62-80) 107/72  SpO2:  [90 %-97 %] 93 %  Patient Vitals for the past 24 hrs:   BP Temp Temp src Heart Rate Resp SpO2 Weight   09/15/19 1133 107/72 98.1  F (36.7  C) Oral 96 18 93 % --   09/15/19 0821 -- 99.1  F (37.3  C) Oral -- -- -- --   09/15/19 0730 117/76 -- -- 96 18 95 % --   09/15/19 0358 107/62 98  F (36.7  C) Oral 94 16 91 % --   09/15/19 0208 -- -- -- -- -- -- 87.6 kg (193 lb 3.2 oz)   09/14/19 2314 111/77 98.1  F (36.7  C) Oral 92 18 97 % --   09/14/19 2027 115/72 98.8  F (37.1  C) Oral 95 18 92 % --   09/14/19 1523 102/69 98.3  F (36.8  C) Oral 99 18 91 % --   09/14/19 1225 113/80 99  F (37.2  C) Axillary 103 18 90 % --     General Appearance: NAD  HEENT: Feeding tube in place  Skin: normal, warm, scattered bruising  Heart: RRR  Lungs: NLB on RA  Abdomen: soft, nondistended, incision sutured, c/d/i.   : Wearing depends, incontinent at times  Extremities: edema: present bilaterally. 2+. Wearing lymphedema wraps.  Neurologic: alert, oriented x2. aggitated and impulsive at times.       Data:   CMP  Recent Labs   Lab 09/15/19  0607 09/14/19  0617    138   POTASSIUM 4.3 4.7   CHLORIDE 97 96   CO2 32 32   * 105*   BUN 29 32*   CR 0.86 0.96   GFRESTIMATED 74 64   GFRESTBLACK 86 75   JACOB 8.6 8.8   MAG 1.8 1.9   PHOS 4.6* 4.6*   ALBUMIN 2.2* 2.2*   BILITOTAL 1.7* 1.6*   ALKPHOS 187* 186*   AST 44  47*   ALT 44 42     CBC  Recent Labs   Lab 09/15/19  0607 09/14/19  0617   HGB 7.2* 7.6*   WBC 5.6 6.2    171         COAGS  No lab results found in last 7 days.    Invalid input(s): XA

## 2019-09-15 NOTE — PROVIDER NOTIFICATION
"Paged team for some pain relief as patient is complaining of abdominal pain, describing it as \"hard pressure\". Awaiting for a call back.   "

## 2019-09-15 NOTE — PLAN OF CARE
Edema 7A: Pt w/o GCB donned upon approach; nursing reports were soiled yesterday. Skin care performed and GCB redonned to reduce BLE lymphedema needed for improvements in mobility, infection prevention, and LB cares.

## 2019-09-15 NOTE — PLAN OF CARE
AVSS, reporting some abdominal discomfort, not requesting intervention.  Patient continues to be confused, appears to be mentating better than yesterday, and attendant at bedside for safety.  Patient up with assist of one, gait belt, and walker, voiding, passing flatus, having bowel movements, poor oral intake, and tube feeds continue at 45ml/hr via NJ.  Scheduled meds given as ordered.  Continue to monitor, treat as ordered, notify team with changes.

## 2019-09-16 ENCOUNTER — APPOINTMENT (OUTPATIENT)
Dept: GENERAL RADIOLOGY | Facility: CLINIC | Age: 59
DRG: 005 | End: 2019-09-16
Attending: NURSE PRACTITIONER
Payer: COMMERCIAL

## 2019-09-16 ENCOUNTER — APPOINTMENT (OUTPATIENT)
Dept: PHYSICAL THERAPY | Facility: CLINIC | Age: 59
DRG: 005 | End: 2019-09-16
Payer: COMMERCIAL

## 2019-09-16 ENCOUNTER — APPOINTMENT (OUTPATIENT)
Dept: OCCUPATIONAL THERAPY | Facility: CLINIC | Age: 59
DRG: 005 | End: 2019-09-16
Payer: COMMERCIAL

## 2019-09-16 LAB
ALBUMIN SERPL-MCNC: 2.2 G/DL (ref 3.4–5)
ALP SERPL-CCNC: 196 U/L (ref 40–150)
ALT SERPL W P-5'-P-CCNC: 43 U/L (ref 0–50)
ANION GAP SERPL CALCULATED.3IONS-SCNC: 7 MMOL/L (ref 3–14)
AST SERPL W P-5'-P-CCNC: 44 U/L (ref 0–45)
BACTERIA SPEC CULT: ABNORMAL
BACTERIA SPEC CULT: ABNORMAL
BASOPHILS # BLD AUTO: 0 10E9/L (ref 0–0.2)
BASOPHILS NFR BLD AUTO: 0.2 %
BILIRUB DIRECT SERPL-MCNC: 0.9 MG/DL (ref 0–0.2)
BILIRUB SERPL-MCNC: 1.4 MG/DL (ref 0.2–1.3)
BUN SERPL-MCNC: 28 MG/DL (ref 7–30)
C DIFF TOX B STL QL: POSITIVE
CALCIUM SERPL-MCNC: 8.5 MG/DL (ref 8.5–10.1)
CHLORIDE SERPL-SCNC: 99 MMOL/L (ref 94–109)
CO2 SERPL-SCNC: 30 MMOL/L (ref 20–32)
CREAT SERPL-MCNC: 0.85 MG/DL (ref 0.52–1.04)
CYCLOSPORINE BLD LC/MS/MS-MCNC: 190 UG/L (ref 50–400)
DIFFERENTIAL METHOD BLD: ABNORMAL
EOSINOPHIL # BLD AUTO: 0.4 10E9/L (ref 0–0.7)
EOSINOPHIL NFR BLD AUTO: 4.9 %
ERYTHROCYTE [DISTWIDTH] IN BLOOD BY AUTOMATED COUNT: 19.2 % (ref 10–15)
FUNGUS SPEC CULT: NORMAL
GFR SERPL CREATININE-BSD FRML MDRD: 75 ML/MIN/{1.73_M2}
GLUCOSE SERPL-MCNC: 99 MG/DL (ref 70–99)
HCT VFR BLD AUTO: 26.4 % (ref 35–47)
HGB BLD-MCNC: 7.8 G/DL (ref 11.7–15.7)
IMM GRANULOCYTES # BLD: 0.4 10E9/L (ref 0–0.4)
IMM GRANULOCYTES NFR BLD: 4.3 %
LYMPHOCYTES # BLD AUTO: 0.4 10E9/L (ref 0.8–5.3)
LYMPHOCYTES NFR BLD AUTO: 5.3 %
Lab: ABNORMAL
MAGNESIUM SERPL-MCNC: 1.7 MG/DL (ref 1.6–2.3)
MCH RBC QN AUTO: 30.6 PG (ref 26.5–33)
MCHC RBC AUTO-ENTMCNC: 29.5 G/DL (ref 31.5–36.5)
MCV RBC AUTO: 104 FL (ref 78–100)
MONOCYTES # BLD AUTO: 1 10E9/L (ref 0–1.3)
MONOCYTES NFR BLD AUTO: 12.7 %
NEUTROPHILS # BLD AUTO: 5.9 10E9/L (ref 1.6–8.3)
NEUTROPHILS NFR BLD AUTO: 72.6 %
NRBC # BLD AUTO: 0 10*3/UL
NRBC BLD AUTO-RTO: 0 /100
PHOSPHATE SERPL-MCNC: 4.3 MG/DL (ref 2.5–4.5)
PLATELET # BLD AUTO: 169 10E9/L (ref 150–450)
POTASSIUM SERPL-SCNC: 4.3 MMOL/L (ref 3.4–5.3)
PROT SERPL-MCNC: 5.6 G/DL (ref 6.8–8.8)
RBC # BLD AUTO: 2.55 10E12/L (ref 3.8–5.2)
SODIUM SERPL-SCNC: 136 MMOL/L (ref 133–144)
SPECIMEN SOURCE: ABNORMAL
SPECIMEN SOURCE: ABNORMAL
SPECIMEN SOURCE: NORMAL
TME LAST DOSE: NORMAL H
WBC # BLD AUTO: 8.1 10E9/L (ref 4–11)

## 2019-09-16 PROCEDURE — 25000132 ZZH RX MED GY IP 250 OP 250 PS 637: Performed by: TRANSPLANT SURGERY

## 2019-09-16 PROCEDURE — 97535 SELF CARE MNGMENT TRAINING: CPT | Mod: GO

## 2019-09-16 PROCEDURE — 25000132 ZZH RX MED GY IP 250 OP 250 PS 637

## 2019-09-16 PROCEDURE — 87040 BLOOD CULTURE FOR BACTERIA: CPT | Performed by: TRANSPLANT SURGERY

## 2019-09-16 PROCEDURE — 25000132 ZZH RX MED GY IP 250 OP 250 PS 637: Performed by: PHYSICIAN ASSISTANT

## 2019-09-16 PROCEDURE — 25000128 H RX IP 250 OP 636: Performed by: PHYSICIAN ASSISTANT

## 2019-09-16 PROCEDURE — 36415 COLL VENOUS BLD VENIPUNCTURE: CPT | Performed by: TRANSPLANT SURGERY

## 2019-09-16 PROCEDURE — 97530 THERAPEUTIC ACTIVITIES: CPT | Mod: GO

## 2019-09-16 PROCEDURE — 25000132 ZZH RX MED GY IP 250 OP 250 PS 637: Performed by: SURGERY

## 2019-09-16 PROCEDURE — 25000132 ZZH RX MED GY IP 250 OP 250 PS 637: Performed by: NURSE PRACTITIONER

## 2019-09-16 PROCEDURE — 12000026 ZZH R&B TRANSPLANT

## 2019-09-16 PROCEDURE — 85025 COMPLETE CBC W/AUTO DIFF WBC: CPT | Performed by: TRANSPLANT SURGERY

## 2019-09-16 PROCEDURE — 80158 DRUG ASSAY CYCLOSPORINE: CPT | Performed by: PHYSICIAN ASSISTANT

## 2019-09-16 PROCEDURE — 25000131 ZZH RX MED GY IP 250 OP 636 PS 637: Performed by: NURSE PRACTITIONER

## 2019-09-16 PROCEDURE — 80053 COMPREHEN METABOLIC PANEL: CPT | Performed by: TRANSPLANT SURGERY

## 2019-09-16 PROCEDURE — 83735 ASSAY OF MAGNESIUM: CPT | Performed by: TRANSPLANT SURGERY

## 2019-09-16 PROCEDURE — 87493 C DIFF AMPLIFIED PROBE: CPT | Performed by: NURSE PRACTITIONER

## 2019-09-16 PROCEDURE — 25000131 ZZH RX MED GY IP 250 OP 636 PS 637: Performed by: PHYSICIAN ASSISTANT

## 2019-09-16 PROCEDURE — 84100 ASSAY OF PHOSPHORUS: CPT | Performed by: TRANSPLANT SURGERY

## 2019-09-16 PROCEDURE — 87040 BLOOD CULTURE FOR BACTERIA: CPT | Performed by: NURSE PRACTITIONER

## 2019-09-16 PROCEDURE — 27210432 ZZH NUTRITION PRODUCT RENAL BASIC LITER

## 2019-09-16 PROCEDURE — 74018 RADEX ABDOMEN 1 VIEW: CPT

## 2019-09-16 PROCEDURE — 71045 X-RAY EXAM CHEST 1 VIEW: CPT

## 2019-09-16 PROCEDURE — 97530 THERAPEUTIC ACTIVITIES: CPT | Mod: GP

## 2019-09-16 PROCEDURE — 82248 BILIRUBIN DIRECT: CPT | Performed by: TRANSPLANT SURGERY

## 2019-09-16 RX ORDER — VANCOMYCIN HYDROCHLORIDE 50 MG/ML
125 KIT ORAL 4 TIMES DAILY
Status: DISCONTINUED | OUTPATIENT
Start: 2019-09-16 | End: 2019-09-23 | Stop reason: HOSPADM

## 2019-09-16 RX ADMIN — MELATONIN 1000 UNITS: at 19:24

## 2019-09-16 RX ADMIN — Medication 1 PACKET: at 09:29

## 2019-09-16 RX ADMIN — VALGANCICLOVIR 450 MG: 450 TABLET, FILM COATED ORAL at 09:28

## 2019-09-16 RX ADMIN — MELATONIN 1000 UNITS: at 09:28

## 2019-09-16 RX ADMIN — CYCLOSPORINE 225 MG: 100 SOLUTION ORAL at 09:28

## 2019-09-16 RX ADMIN — MULTIVITAMIN 15 ML: LIQUID ORAL at 09:28

## 2019-09-16 RX ADMIN — ASPIRIN 325 MG ORAL TABLET 325 MG: 325 PILL ORAL at 09:28

## 2019-09-16 RX ADMIN — CYCLOSPORINE 225 MG: 100 SOLUTION ORAL at 19:24

## 2019-09-16 RX ADMIN — FAMOTIDINE 20 MG: 20 TABLET ORAL at 09:29

## 2019-09-16 RX ADMIN — Medication 5000 UNITS: at 03:55

## 2019-09-16 RX ADMIN — DAPSONE 100 MG: 25 TABLET ORAL at 10:00

## 2019-09-16 RX ADMIN — MAGNESIUM OXIDE TAB 400 MG (241.3 MG ELEMENTAL MG) 400 MG: 400 (241.3 MG) TAB at 12:39

## 2019-09-16 RX ADMIN — MELATONIN TAB 3 MG 3 MG: 3 TAB at 19:24

## 2019-09-16 RX ADMIN — MYCOPHENOLIC ACID 540 MG: 360 TABLET, DELAYED RELEASE ORAL at 19:24

## 2019-09-16 RX ADMIN — VANCOMYCIN HYDROCHLORIDE 125 MG: KIT at 19:24

## 2019-09-16 RX ADMIN — Medication 5000 UNITS: at 12:39

## 2019-09-16 RX ADMIN — MYCOPHENOLIC ACID 540 MG: 360 TABLET, DELAYED RELEASE ORAL at 09:27

## 2019-09-16 RX ADMIN — VANCOMYCIN HYDROCHLORIDE 125 MG: KIT at 13:50

## 2019-09-16 RX ADMIN — VANCOMYCIN HYDROCHLORIDE 125 MG: KIT at 16:15

## 2019-09-16 RX ADMIN — LEVOTHYROXINE SODIUM 175 MCG: 175 TABLET ORAL at 09:28

## 2019-09-16 RX ADMIN — Medication 5000 UNITS: at 19:24

## 2019-09-16 ASSESSMENT — ACTIVITIES OF DAILY LIVING (ADL)
ADLS_ACUITY_SCORE: 14.5
ADLS_ACUITY_SCORE: 16
ADLS_ACUITY_SCORE: 16
ADLS_ACUITY_SCORE: 14
ADLS_ACUITY_SCORE: 16
ADLS_ACUITY_SCORE: 14

## 2019-09-16 NOTE — PLAN OF CARE
OT/7A: Discharge Planner OT   Patient plan for discharge: Not discussed this session.  Current status: Mod A x2 to stand from commode x2 using FWW. Dependent pericares. Required heavy verbal directional cues and Mod A x2 during SPT from commode to EOB, with additional aid for line managment. Max A x2 EOB>supine. Pt demonstrates confusion throughout session. SPO2 >98% throughout.  Barriers to return to prior living situation: activity tolerance, assist x2, weakness, cognition, abdominal precautions, pain  Recommendations for discharge: TCU  Rationale for recommendations: Pt would benefit from further skilled OT to address activity tolerance and weakness to improve ADL/IADL participation within precautions. Pt very confused today requiring increased assist during functional tx's.       Entered by: Jasmyne River 09/16/2019 2:49 PM

## 2019-09-16 NOTE — PLAN OF CARE
/56 (BP Location: Left arm)   Pulse 100   Temp 99.7  F (37.6  C) (Axillary)   Resp 18   Wt 87.6 kg (193 lb 3.2 oz)   SpO2 96%   BMI 35.34 kg/m      Tmax: 99.7ax, HR: 100-102, OVSS, on 2L/NC. 88% on RA. Pt. remains confused to time & situation, attendant at bedside for safety. Pt. c/o abdominal pain & given warm pack with some relief. Pt. voided in bedpan x1 & incontinent x1.  No stools this shift. Left PIV saline locked. Continuous tube feeds at 45cc/hour via NJT. Pt. slept on & off between cares. Continue to monitor & treat per POC & notify team with change in status.

## 2019-09-16 NOTE — PLAN OF CARE
Discharge Planner PT   Patient plan for discharge: Not discussed this session  Current status: Pt rolls with Rafa x 2 2/2 limited participation, heavy cuing needed. Attempted supine > sit to facilitate bedside transfer to [facility] as pt is incontinent of bowel, though pt strongly refuses.   Barriers to return to prior living situation: Medical status, confusion, decreased activity tolerance, decreased strength  Recommendations for discharge: TCU  Rationale for recommendations: Pt requires assist for bed mobility 2/2 confusion, would benefit from further skilled therapy to improve bed mobility, transfers, ambulation and activity tolerance.        Entered by: Shari Parra 09/16/2019 9:05 AM

## 2019-09-16 NOTE — PLAN OF CARE
Discharge Planner PT   Patient plan for discharge: Not discussed today due to pt's mentation  Current status: PT:Pt supine, initially agreeable to working with PT with a little encouragement. Pt rolls min A of 2, then heavy mod A of 2 side>sitting mostly for direction following. Pt seated EOB with min A x5 minutes while attempting to discuss transfer to chair, pt began perseverating on needing to call brother. Unable to rediret pt and pt began attempting to laydown despite encouragement. Mod A into supine and dependent boost in bed and mod/max A to reposition once supine. Pt will all need met and RN working with pt, sitter present throughout.  Barriers to return to prior living situation: cognitive status, medical and functional status  Recommendations for discharge: TCU  Rationale for recommendations: Pt will need continue therapies to progress functional mobility and ADLs prior to home, once cognitively stable.       Entered by: Ariella Abdi 09/16/2019 6:41 AM

## 2019-09-16 NOTE — PLAN OF CARE
[id number]    /71 (BP Location: Right arm)   Pulse 100   Temp 99.9  F (37.7  C) (Oral)   Resp 18   Wt 87.6 kg (193 lb 3.2 oz)   SpO2 94%   BMI 35.34 kg/m       VS: T max 99.9, other VSS, on 1 LPM.   BG: none.   LABS: WNL.   Pain: continue to use heat or ice to help with abd pain.   PRN: none.  Nausea: denies.   Diet: 2 gram K+ diet with poor PO intake, TF at 45ml/hr.   LDA: NJ with continuous TF, PIV SL'd.   GI/: Voiding, loose stools +c-diff, enteric isolation ordered. Incontinent of both stool and urine.  Skin: reddened buttock barrier paste applied after bowel movements.  Mobility: Strong assist x2 with gait belt and walker.   Education: Continuing to reorient to the environment.   Tests/Procedures: Chest x-ray. Pt and Ot attempted to come see patient today.  Mentation: Pt has had increasing confusing, was frustrated this morning, is currently resting calmly this afternoon. Unable to swallow meds, able to swallow the ones that cannot be crushed.  Plan: Continue to monitor mentation, pain, and continue to treat c-diff, (positive result this AM and isolation and treatment started).     All care explained and questions answered. Will continue to monitor and notify team of changes.      Uterine Irritability

## 2019-09-16 NOTE — PROVIDER NOTIFICATION
Lab reported positive C-diff. ROBERTO Blankenship notified. Will await further orders and maintain appropriate isolation.

## 2019-09-16 NOTE — PROGRESS NOTES
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     Today, pt developed fever and worsening abdominal pain, found to be CDiff+. Overall, worsening clinical status, with increased confusion, weakness (now needing 3 people to assist from bed to chair; last week able to ambulate halls w/ assistance). If no improvement with antibiotics, will consider ERCP to eval biliary leak from anastomosis vs. abscess.    Recommendations:   --Continue to monitor LFT's     Patient was discussed with Dr. Evelyne Bailey MD, [facility]  GI Fellow, PGY4  Pager:[phone]      __________________________________________________________________________________  Subjective:  Nursing notes reviewed and noted.  History obtained from nurse and chart as pt is very tired and confused.     Physical Examination:  Vital Signs:  /69 (BP Location: Left arm)   Pulse 100   Temp 100.3  F (37.9  C) (Axillary)   Resp 18   Wt 87.6 kg (193 lb 3.2 oz)   SpO2 95%   BMI 35.34 kg/m     General:  NAD. Resting comfortably.  HEENT:  PERRL, EOMI, No scleral icterus of conjunctival injection. MMM.  Neck:  Supple.   Chest:  Clear bilaterally.    Cardiovascular: RRR. No m/r/g.   Abdomen:  Incision c/d/i, Soft, minimally tender diffusely. ND. BS+, No organomegally.   Extremities:  +Wraps on bilaterally lower extremities.   Skin:  No rashes, abrasions or concerning lesions appreciated.   Neurological:  Sleepy, tired, able to answer yes/no questions, and knows name. Falls asleep during conversation.     DATA:  Labs:    Reviewed and noted      TBili 1.4 (1.7 yesterday)   (187 yesterday)  ALT and AST remain within normal limits  D Bili 0.9 (0.9 yesterday)

## 2019-09-16 NOTE — PLAN OF CARE
/75 (BP Location: Left arm)   Pulse 98   Temp 99.1  F (37.3  C)   Resp 18   Wt 87.6 kg (193 lb 3.2 oz)   SpO2 98%   BMI 35.34 kg/m      [id number]: Pt admitted for liver txp, POD# 28, d/t ANGULO. AVSS on RA ex tachy in low 100s.  visiting this evening.  Neuro: Pt would intermittently answer orientation questions, only correct part of the time. Continues to be impulsive, needing frequent reminders to not pull her lines and wait for help with mobility.  Cardiac: WDL ex slightly tachy, in low 100s. Did have one episode of hypotension (80s/40s - provider notified) with tachycardia (100s-110s) that resolved with a 250 ml bolus.   Resp: WDL, encourage IS use. During episode of hypotension, O2 sats dropped to 88% (provider notified), was placed on 2 L NC and satting at 95%, decreased to 1 L and satting >95%. Placed on continuous pulse ox.   GI/: Last BM today. Voiding adequate amounts via BSC.   Diet/Appetite: 2 g K diet with no appetite. Needs assistance eating. Took meds with Boost juice and drank ~50% one carton. Ate 0% of dinner. On continuous TFs via NJ @ 45/hr with Q4 30 ml H2O flushes.   Endocrine: NA.  Skin: Clamshell incision sutured, SILVIA, CDI. L OBED site sutured, CDI, SILVIA. Bruising throughout body.   Access: Original PIV blew, new one placed. PIV x1 SLd.   Drains: NA.  Activity: Up A1 + W. Refused getting in chair this shift, got to BSC numerous times.   Pain: Denies.   Plan: Continue trending liver labs (which are slowly trending up), encourage PO intake, improve mentation and strength. Will continue with plan of care and notify team of any changes.?

## 2019-09-16 NOTE — PROGRESS NOTES
Attestation:     I, Mee Guan, [facility] staff , have reviewed and edited the following  student s note on 9/17/2019 at 8:52 AM.       SPIRITUAL HEALTH SERVICES  SPIRITUAL ASSESSMENT Progress Note  [facility] ([place]) 7A    REFERRAL SOURCE: Length of stay    Patient was asleep so I spoke with her  (Hugo). Hugo said patient had a rough night due to abdominal pain. He said she is eager to go home; she hasn't been home since July. He had tears in his eyes and said this is taking a long time, and it is difficult to see her like this. He was going to take a break and see his son. I told  that they could ask for a  any time they wanted to.    PLAN: I will follow up if they request to see a  .    Chika Fraga  Chaplain Resident  Pager [phone]

## 2019-09-16 NOTE — PROGRESS NOTES
Transplant Surgery  Inpatient Daily Progress Note  09/16/2019    Assessment & Plan: Nicci Pemberton is a 60 yo female with a past medical history significant for end stage liver disease 2/2 ANGULO and alpha 1 anti-trypsin deficiency now s/p DD OLT on 8/18/19.     Graft function: DD OLT 8/18/19 without stent-POD #29  Transaminases, AP stable. Tbili had been uptrending, peaked at 1.7 but improved to 1.4 today.  Panc/bili team following for possible ERCP-no indication today.    Immunosuppression management:   Tac - stopped due to prolonged delirium,   CSA started 9/2.  mg BID to titrate to a goal of 200-300. Level today 190. Increased dose yesterday,  No adjustment today.  MMF increased to 1000 mg BID during transition of CNI. Due to nausea,  changed to Myfortic 540 mg BID.   Pred 5mg when transitioning IS meds, now discontinued.   Complexity of management: Medium. Contributing factors: anemia and inductionencephalopathy.     Hematology: Acute blood loss anemia. Transfusion goal hgb > 7. Hgb stable, 7.8 today. Last transfused 3 units 8/20 and 1 unit 9/8.     HEENT: Epistaxis: due to friable mucosa, managed with packing, now resolved.     Cardiorespiratory: Patient extubated 8/24. RA. Encourage IS and ambulation.     Neuro:   Toxic and metabolic encephalopathy likely secondary to poor sleep, drugs (tac, pain medication), acute illness. Ordered melatonin and bedtime seroquel to help patient sleep, patient's mental status has seemed to be improving overall, but waxes/wanes.   -MRI head 9/3- results unremarkable. Discontinued Tacrolimus. Consulted neurology, recommended LP to rule out infectious etiology and Video EEG monitoring completed, no evidence of seizures.    9/5 LP, results thus far negative for infection. WBC 1. Protein 44 and glucose 51. Negative CSF studies: cryptococcal, enterovirus, HSV, VZV.    -VBG reviewed. Avoid medications that cause respiratory depression.   -Continue scheduled melatonin and Seroquel  at bedtime.   -Continue bedside sitter due to pulling at tubes.   -work on day/night cycles     GI/Nutrition: Regular diet, NJ in place. Continue EN-Change to Nepro.   Diarrhea - Started today. In setting of new onset , fever, and abdominal pain-send cdiff.   Abdominal pain-new onset today.  Will obtain AXR.  Endocrine: Steroid induced hyperglycemia, resolved    Fluid/Electrolytes:   GAMAL- CRRT stopped and patient has required no further HD. Incontinent of urine, so difficult to quantify, but Cr now normalized. SCr 1.1.   Metabolic alkalosis, with compensated respiratory acidosis: CO2  30's. Nephrology following.    avoiding loop diuretics, obtain urine lytes.   Hyperkalemia: K 4.3-improved after transitioning to Nepro EN.    : Incontinent    Infectious disease: low grade fever today. 100.4,  WBC WNL but uptrending 8.1 today from 5.6-obtain infectious workup-Blood/urine/ cxr  Ppx with micafungin x 10 days completed, zosyn completed.   PPx with dapsone and valcyte.    Prophylaxis: Heparin subcutaneous TID.     Activity: OOB to chair today. PT/OT.     Disposition: 7A     Medical Decision Making: Medium  Subsequent visit 52178 (moderate level decision making)    VEENA/Fellow/Resident Provider: Yvette Reich NP      Faculty: Selena Howard MD    ____________________________________________________________  Transplant History: Admitted 8/16/2019 for acute decompensated ANGULO.   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    8/18/2019 (Liver), Postoperative day: 29     Interval History:   More shaky and weak today.  Requiring assist of 2. New incontinence of stool-diarrhea. C/o abdominal pain. Continues with poor po intake.     ROS:   A 10-point review of systems was negative except as noted above.    Curent Meds:    aspirin  325 mg Oral Daily     carboxymethylcellulose PF  2 drop Both Eyes Q6H     clotrimazole  1 Molly Buccal 4x Daily     cycloSPORINE modified  225 mg Oral BID IS      dapsone  100 mg Oral Daily     famotidine  20 mg Oral Daily     heparin  5,000 Units Subcutaneous Q8H     levothyroxine  175 mcg Oral Daily     magnesium oxide  400 mg Oral Daily     melatonin  3 mg Oral At Bedtime     multivitamins w/minerals  15 mL Per Feeding Tube Daily     mycophenolic acid  540 mg Oral BID IS     protein modular  1 packet Per Feeding Tube Daily     sodium chloride  1 spray Both Nostrils Q4H     sodium chloride (PF)  3 mL Intravenous Q8H     sodium chloride (PF)  3 mL Intracatheter Q8H     valGANciclovir  450 mg Oral Daily     vancomycin  125 mg Oral 4x Daily     vitamin D3  1,000 Units Oral BID       Physical Exam:     Admit Weight: 104.3 kg (230 lb)    Current Vitals:   /71 (BP Location: Right arm)   Pulse 100   Temp 99.9  F (37.7  C) (Oral)   Resp 18   Wt 87.6 kg (193 lb 3.2 oz)   SpO2 94%   BMI 35.34 kg/m      Vital sign ranges:    Temp:  [98.2  F (36.8  C)-100.3  F (37.9  C)] 99.9  F (37.7  C)  Pulse:  [] 100  Heart Rate:  [] 100  Resp:  [18] 18  BP: ()/(49-76) 108/71  SpO2:  [88 %-98 %] 94 %  Patient Vitals for the past 24 hrs:   BP Temp Temp src Pulse Heart Rate Resp SpO2   09/16/19 1236 108/71 99.9  F (37.7  C) Oral -- 100 18 94 %   09/16/19 0921 102/69 -- -- -- 107 -- 95 %   09/16/19 0750 116/76 100.3  F (37.9  C) Axillary -- 102 18 96 %   09/16/19 0400 125/56 99.7  F (37.6  C) Axillary -- 102 18 96 %   09/16/19 0156 -- 98.7  F (37.1  C) Oral -- -- -- --   09/16/19 0138 -- -- -- -- -- -- 97 %   09/16/19 0006 110/71 100  F (37.8  C) Oral 100 -- 18 96 %   09/15/19 2015 123/75 99.1  F (37.3  C) -- 98 -- -- 98 %   09/15/19 1700 115/73 -- -- -- -- -- --   09/15/19 1644 (!) 80/49 -- -- -- 97 -- 98 %   09/15/19 1625 (!) 87/49 98.2  F (36.8  C) Oral -- 101 18 90 %   09/15/19 1620 (!) 82/54 -- -- -- 98 -- (!) 88 %     General Appearance: NAD  HEENT: Feeding tube in place with EN infusing  Skin: normal, warm, scattered bruising  Heart: RRR  Lungs: NLB on  RA  Abdomen: soft, nondistended,  incision sutured, c/d/i.   : Wearing depends, incontinent at times  Extremities: edema: present bilaterally. 2+. Wearing lymphedema wraps.  Neurologic: alert, oriented x2. impulsive at times. Tremor to LUE.       Data:   CMP  Recent Labs   Lab 09/16/19  0531 09/15/19  0607    137   POTASSIUM 4.3 4.3   CHLORIDE 99 97   CO2 30 32   GLC 99 104*   BUN 28 29   CR 0.85 0.86   GFRESTIMATED 75 74   GFRESTBLACK 87 86   JACOB 8.5 8.6   MAG 1.7 1.8   PHOS 4.3 4.6*   ALBUMIN 2.2* 2.2*   BILITOTAL 1.4* 1.7*   ALKPHOS 196* 187*   AST 44 44   ALT 43 44     CBC  Recent Labs   Lab 09/16/19  0531 09/15/19  0607   HGB 7.8* 7.2*   WBC 8.1 5.6    177         COAGS  No lab results found in last 7 days.    Invalid input(s): XA

## 2019-09-17 ENCOUNTER — APPOINTMENT (OUTPATIENT)
Dept: OCCUPATIONAL THERAPY | Facility: CLINIC | Age: 59
DRG: 005 | End: 2019-09-17
Payer: COMMERCIAL

## 2019-09-17 ENCOUNTER — APPOINTMENT (OUTPATIENT)
Dept: PHYSICAL THERAPY | Facility: CLINIC | Age: 59
DRG: 005 | End: 2019-09-17
Payer: COMMERCIAL

## 2019-09-17 LAB
ABO + RH BLD: NORMAL
ABO + RH BLD: NORMAL
ALBUMIN SERPL-MCNC: 2.1 G/DL (ref 3.4–5)
ALBUMIN UR-MCNC: 30 MG/DL
ALP SERPL-CCNC: 176 U/L (ref 40–150)
ALT SERPL W P-5'-P-CCNC: 43 U/L (ref 0–50)
ANION GAP SERPL CALCULATED.3IONS-SCNC: 6 MMOL/L (ref 3–14)
APPEARANCE UR: ABNORMAL
AST SERPL W P-5'-P-CCNC: 42 U/L (ref 0–45)
BASOPHILS # BLD AUTO: 0 10E9/L (ref 0–0.2)
BASOPHILS NFR BLD AUTO: 0.1 %
BILIRUB DIRECT SERPL-MCNC: 0.8 MG/DL (ref 0–0.2)
BILIRUB SERPL-MCNC: 1.4 MG/DL (ref 0.2–1.3)
BILIRUB UR QL STRIP: NEGATIVE
BLD GP AB SCN SERPL QL: NORMAL
BLD PROD TYP BPU: NORMAL
BLD PROD TYP BPU: NORMAL
BLD UNIT ID BPU: 0
BLOOD BANK CMNT PATIENT-IMP: NORMAL
BLOOD PRODUCT CODE: NORMAL
BPU ID: NORMAL
BUN SERPL-MCNC: 28 MG/DL (ref 7–30)
CALCIUM SERPL-MCNC: 8.6 MG/DL (ref 8.5–10.1)
CHLORIDE SERPL-SCNC: 99 MMOL/L (ref 94–109)
CO2 SERPL-SCNC: 30 MMOL/L (ref 20–32)
COLOR UR AUTO: YELLOW
CREAT SERPL-MCNC: 0.77 MG/DL (ref 0.52–1.04)
CYCLOSPORINE BLD LC/MS/MS-MCNC: 203 UG/L (ref 50–400)
DIFFERENTIAL METHOD BLD: ABNORMAL
EOSINOPHIL # BLD AUTO: 0.5 10E9/L (ref 0–0.7)
EOSINOPHIL NFR BLD AUTO: 7 %
ERYTHROCYTE [DISTWIDTH] IN BLOOD BY AUTOMATED COUNT: 19.6 % (ref 10–15)
GFR SERPL CREATININE-BSD FRML MDRD: 85 ML/MIN/{1.73_M2}
GLUCOSE SERPL-MCNC: 105 MG/DL (ref 70–99)
GLUCOSE UR STRIP-MCNC: NEGATIVE MG/DL
GRAN CASTS #/AREA URNS LPF: 4 /LPF
HCT VFR BLD AUTO: 22.9 % (ref 35–47)
HGB BLD-MCNC: 6.8 G/DL (ref 11.7–15.7)
HGB UR QL STRIP: NEGATIVE
IMM GRANULOCYTES # BLD: 0.2 10E9/L (ref 0–0.4)
IMM GRANULOCYTES NFR BLD: 2.9 %
IRON SATN MFR SERPL: 17 % (ref 15–46)
IRON SERPL-MCNC: 34 UG/DL (ref 35–180)
KETONES UR STRIP-MCNC: NEGATIVE MG/DL
LEUKOCYTE ESTERASE UR QL STRIP: ABNORMAL
LYMPHOCYTES # BLD AUTO: 0.3 10E9/L (ref 0.8–5.3)
LYMPHOCYTES NFR BLD AUTO: 4.4 %
MAGNESIUM SERPL-MCNC: 1.8 MG/DL (ref 1.6–2.3)
MCH RBC QN AUTO: 30.9 PG (ref 26.5–33)
MCHC RBC AUTO-ENTMCNC: 29.7 G/DL (ref 31.5–36.5)
MCV RBC AUTO: 104 FL (ref 78–100)
MONOCYTES # BLD AUTO: 0.8 10E9/L (ref 0–1.3)
MONOCYTES NFR BLD AUTO: 10.2 %
MUCOUS THREADS #/AREA URNS LPF: PRESENT /LPF
NEUTROPHILS # BLD AUTO: 5.7 10E9/L (ref 1.6–8.3)
NEUTROPHILS NFR BLD AUTO: 75.4 %
NITRATE UR QL: NEGATIVE
NRBC # BLD AUTO: 0 10*3/UL
NRBC BLD AUTO-RTO: 0 /100
NUM BPU REQUESTED: 1
PH UR STRIP: 5.5 PH (ref 5–7)
PHOSPHATE SERPL-MCNC: 4.2 MG/DL (ref 2.5–4.5)
PLATELET # BLD AUTO: 173 10E9/L (ref 150–450)
POTASSIUM SERPL-SCNC: 4.2 MMOL/L (ref 3.4–5.3)
PROT SERPL-MCNC: 5.5 G/DL (ref 6.8–8.8)
RBC # BLD AUTO: 2.2 10E12/L (ref 3.8–5.2)
RBC #/AREA URNS AUTO: 0 /HPF (ref 0–2)
SODIUM SERPL-SCNC: 135 MMOL/L (ref 133–144)
SOURCE: ABNORMAL
SP GR UR STRIP: 1.02 (ref 1–1.03)
SPECIMEN EXP DATE BLD: NORMAL
SQUAMOUS #/AREA URNS AUTO: 3 /HPF (ref 0–1)
TIBC SERPL-MCNC: 194 UG/DL (ref 240–430)
TME LAST DOSE: NORMAL H
TRANS CELLS #/AREA URNS HPF: <1 /HPF (ref 0–1)
TRANSFUSION STATUS PATIENT QL: NORMAL
TRANSFUSION STATUS PATIENT QL: NORMAL
UROBILINOGEN UR STRIP-MCNC: NORMAL MG/DL (ref 0–2)
WBC # BLD AUTO: 7.5 10E9/L (ref 4–11)
WBC #/AREA URNS AUTO: 3 /HPF (ref 0–5)

## 2019-09-17 PROCEDURE — 27210432 ZZH NUTRITION PRODUCT RENAL BASIC LITER

## 2019-09-17 PROCEDURE — 83735 ASSAY OF MAGNESIUM: CPT | Performed by: TRANSPLANT SURGERY

## 2019-09-17 PROCEDURE — 97530 THERAPEUTIC ACTIVITIES: CPT | Mod: GO

## 2019-09-17 PROCEDURE — 97530 THERAPEUTIC ACTIVITIES: CPT | Mod: GP

## 2019-09-17 PROCEDURE — 25000131 ZZH RX MED GY IP 250 OP 636 PS 637: Performed by: NURSE PRACTITIONER

## 2019-09-17 PROCEDURE — 87086 URINE CULTURE/COLONY COUNT: CPT | Performed by: NURSE PRACTITIONER

## 2019-09-17 PROCEDURE — 86900 BLOOD TYPING SEROLOGIC ABO: CPT | Performed by: INTERNAL MEDICINE

## 2019-09-17 PROCEDURE — 84100 ASSAY OF PHOSPHORUS: CPT | Performed by: TRANSPLANT SURGERY

## 2019-09-17 PROCEDURE — 81001 URINALYSIS AUTO W/SCOPE: CPT | Performed by: NURSE PRACTITIONER

## 2019-09-17 PROCEDURE — P9016 RBC LEUKOCYTES REDUCED: HCPCS | Performed by: INTERNAL MEDICINE

## 2019-09-17 PROCEDURE — 83540 ASSAY OF IRON: CPT | Performed by: TRANSPLANT SURGERY

## 2019-09-17 PROCEDURE — 85025 COMPLETE CBC W/AUTO DIFF WBC: CPT | Performed by: TRANSPLANT SURGERY

## 2019-09-17 PROCEDURE — 25000132 ZZH RX MED GY IP 250 OP 250 PS 637: Performed by: SURGERY

## 2019-09-17 PROCEDURE — 83550 IRON BINDING TEST: CPT | Performed by: TRANSPLANT SURGERY

## 2019-09-17 PROCEDURE — 86923 COMPATIBILITY TEST ELECTRIC: CPT | Performed by: INTERNAL MEDICINE

## 2019-09-17 PROCEDURE — 25000132 ZZH RX MED GY IP 250 OP 250 PS 637: Performed by: TRANSPLANT SURGERY

## 2019-09-17 PROCEDURE — 25000132 ZZH RX MED GY IP 250 OP 250 PS 637

## 2019-09-17 PROCEDURE — 25000132 ZZH RX MED GY IP 250 OP 250 PS 637: Performed by: NURSE PRACTITIONER

## 2019-09-17 PROCEDURE — 86901 BLOOD TYPING SEROLOGIC RH(D): CPT | Performed by: INTERNAL MEDICINE

## 2019-09-17 PROCEDURE — 25000132 ZZH RX MED GY IP 250 OP 250 PS 637: Performed by: PHYSICIAN ASSISTANT

## 2019-09-17 PROCEDURE — 86850 RBC ANTIBODY SCREEN: CPT | Performed by: INTERNAL MEDICINE

## 2019-09-17 PROCEDURE — 97140 MANUAL THERAPY 1/> REGIONS: CPT | Mod: GO | Performed by: OCCUPATIONAL THERAPIST

## 2019-09-17 PROCEDURE — 36415 COLL VENOUS BLD VENIPUNCTURE: CPT | Performed by: TRANSPLANT SURGERY

## 2019-09-17 PROCEDURE — 25000128 H RX IP 250 OP 636: Performed by: PHYSICIAN ASSISTANT

## 2019-09-17 PROCEDURE — 80053 COMPREHEN METABOLIC PANEL: CPT | Performed by: TRANSPLANT SURGERY

## 2019-09-17 PROCEDURE — 80158 DRUG ASSAY CYCLOSPORINE: CPT | Performed by: PHYSICIAN ASSISTANT

## 2019-09-17 PROCEDURE — 82248 BILIRUBIN DIRECT: CPT | Performed by: TRANSPLANT SURGERY

## 2019-09-17 PROCEDURE — 97535 SELF CARE MNGMENT TRAINING: CPT | Mod: GO

## 2019-09-17 PROCEDURE — 97110 THERAPEUTIC EXERCISES: CPT | Mod: GP

## 2019-09-17 PROCEDURE — 25000131 ZZH RX MED GY IP 250 OP 636 PS 637: Performed by: PHYSICIAN ASSISTANT

## 2019-09-17 PROCEDURE — 12000026 ZZH R&B TRANSPLANT

## 2019-09-17 RX ORDER — CARBOXYMETHYLCELLULOSE SODIUM 5 MG/ML
2 SOLUTION/ DROPS OPHTHALMIC 3 TIMES DAILY PRN
Status: DISCONTINUED | OUTPATIENT
Start: 2019-09-17 | End: 2019-09-23 | Stop reason: HOSPADM

## 2019-09-17 RX ADMIN — VANCOMYCIN HYDROCHLORIDE 125 MG: KIT at 08:52

## 2019-09-17 RX ADMIN — CLOTRIMAZOLE 1 TROCHE: 10 LOZENGE ORAL at 08:52

## 2019-09-17 RX ADMIN — MELATONIN 1000 UNITS: at 19:52

## 2019-09-17 RX ADMIN — LEVOTHYROXINE SODIUM 175 MCG: 175 TABLET ORAL at 08:52

## 2019-09-17 RX ADMIN — VANCOMYCIN HYDROCHLORIDE 125 MG: KIT at 15:59

## 2019-09-17 RX ADMIN — MYCOPHENOLIC ACID 540 MG: 360 TABLET, DELAYED RELEASE ORAL at 08:52

## 2019-09-17 RX ADMIN — SALINE NASAL SPRAY 1 SPRAY: 1.5 SOLUTION NASAL at 08:53

## 2019-09-17 RX ADMIN — CYCLOSPORINE 225 MG: 100 SOLUTION ORAL at 08:52

## 2019-09-17 RX ADMIN — MELATONIN TAB 3 MG 3 MG: 3 TAB at 19:52

## 2019-09-17 RX ADMIN — Medication 1 PACKET: at 08:53

## 2019-09-17 RX ADMIN — Medication 5000 UNITS: at 19:51

## 2019-09-17 RX ADMIN — VALGANCICLOVIR 450 MG: 450 TABLET, FILM COATED ORAL at 08:52

## 2019-09-17 RX ADMIN — CYCLOSPORINE 225 MG: 100 SOLUTION ORAL at 18:09

## 2019-09-17 RX ADMIN — VANCOMYCIN HYDROCHLORIDE 125 MG: KIT at 12:43

## 2019-09-17 RX ADMIN — CLOTRIMAZOLE 1 TROCHE: 10 LOZENGE ORAL at 12:43

## 2019-09-17 RX ADMIN — MYCOPHENOLIC ACID 540 MG: 360 TABLET, DELAYED RELEASE ORAL at 18:09

## 2019-09-17 RX ADMIN — CLOTRIMAZOLE 1 TROCHE: 10 LOZENGE ORAL at 19:51

## 2019-09-17 RX ADMIN — VANCOMYCIN HYDROCHLORIDE 125 MG: KIT at 19:50

## 2019-09-17 RX ADMIN — Medication 2 DROP: at 09:02

## 2019-09-17 RX ADMIN — ASPIRIN 325 MG ORAL TABLET 325 MG: 325 PILL ORAL at 08:52

## 2019-09-17 RX ADMIN — Medication 5000 UNITS: at 03:17

## 2019-09-17 RX ADMIN — MELATONIN 1000 UNITS: at 08:54

## 2019-09-17 RX ADMIN — SALINE NASAL SPRAY 1 SPRAY: 1.5 SOLUTION NASAL at 19:59

## 2019-09-17 RX ADMIN — CLOTRIMAZOLE 1 TROCHE: 10 LOZENGE ORAL at 15:59

## 2019-09-17 RX ADMIN — DAPSONE 100 MG: 25 TABLET ORAL at 08:52

## 2019-09-17 RX ADMIN — MULTIVITAMIN 15 ML: LIQUID ORAL at 08:52

## 2019-09-17 RX ADMIN — MAGNESIUM OXIDE TAB 400 MG (241.3 MG ELEMENTAL MG) 400 MG: 400 (241.3 MG) TAB at 12:43

## 2019-09-17 RX ADMIN — Medication 5000 UNITS: at 12:43

## 2019-09-17 RX ADMIN — FAMOTIDINE 20 MG: 20 TABLET ORAL at 08:53

## 2019-09-17 ASSESSMENT — ACTIVITIES OF DAILY LIVING (ADL)
ADLS_ACUITY_SCORE: 16
ADLS_ACUITY_SCORE: 16.5
ADLS_ACUITY_SCORE: 16.5
ADLS_ACUITY_SCORE: 16

## 2019-09-17 NOTE — PROGRESS NOTES
GI Progress Note  Date of Service:  9/17/2019      Assessment:     Ms Pemberton is a 60yo woman with A1AT status post duct to duct transplant (without stent) in August with mild bilirubin elevation without overt evidence of anastomotic stricture.  On 9/16 had fever, worsening abdominal pain, ongoing loose stools, was found to be CDiff+. Total Bilirubin has remained stable ~1.4.     Recommendations:   --Ok to discharge to rehab from GI standpoint  --Please let us know if she develops any signs of obstruction (I.e. Rising bilirubin, concern for cholangitis...etc).  --GI will sign off; let us know if you have any questions.     Patient was discussed with Dr. Evelyne Bailey MD, [facility]  GI Fellow, PGY4  Pager:[phone]      __________________________________________________________________________________  Subjective:  Nursing notes reviewed and noted.  Feeling better, decreased # of stools. Up to chair today with more energy, willing to work with rehab. Eating.     Physical Examination:  Vital Signs:  /61 (BP Location: Left arm)   Pulse 94   Temp 98.4  F (36.9  C) (Oral)   Resp 16   Wt 89.5 kg (197 lb 5 oz)   SpO2 92%   BMI 36.09 kg/m     General:  NAD. Resting comfortably.  HEENT:  PERRL, EOMI, No scleral icterus of conjunctival injection. MMM.  Neck:  Supple.   Chest:  Clear bilaterally.    Cardiovascular: RRR. No m/r/g.   Abdomen:  Soft, +slight tenderness, ND. BS+, No organomegally.   Extremities:  No peripheral edema.   Skin:  No rashes, abrasions or concerning lesions appreciated.   Neurological: AOx2 (person, place, thinks it's the year 2020)    DATA:  Labs:    Reviewed and noted

## 2019-09-17 NOTE — PROGRESS NOTES
CLINICAL NUTRITION SERVICES - REASSESSMENT NOTE     Nutrition Prescription    RECOMMENDATIONS FOR MDs/PROVIDERS TO ORDER:  Please consider liberalizing diet if K+ continues to be normal (has been since switch to Nepro)    Malnutrition Status:    Severe malnutrition in the context of acute on chronic illness    Recommendations already ordered by Registered Dietitian (RD):  Continue Boost Breeze supplements BID between meals    Calorie counts (9/19-9/21) to evaluate if any progress with PO intake    Nepro @ 65 ml/hr x 16 hours + 1 pkt Prosource TF daily  - 1912 kcal (32 kcal/kg), 95 grams protein (1.6g/kg) daily       EVALUATION OF THE PROGRESS TOWARD GOALS   Diet: 2 gm K+ and supplements (Boost Breeze BID daily)    TF: Nepro @ 45 ml/hr + 1 pkt Prosource via NDT (replaced 9/6) - FT bridle     Intake:  Patient received an average of 1712 kcal (29 kcal/kg), 89 grams protein (1.5g/kg).      PO intake: Limited by nausea and very poor appetite (bites only).  Occassionally takes sips of Boost and takes pills in applesauce.  Per discussion with other providers today, patient had a good day and was more alert and talkative than other days.  Per nutrition associate - ordered food with her today, which is the first time since admission.        NEW FINDINGS   GI: 4-6 BM/day over the last 3 days, being treated for C.Diff (+ on 9/16)    Labs: K+ normal, BUN 31 (high)    Meds: MVI with minerals, Mag-Ox 400 mg daily, Myfortic 540 mg BID, Cyclosporine 225 mg BID, Vitamin D 1000 international unit(s) BID    Weight: Weight stable over the last week. Significant weight loss since admission.  Current dry weight is 87.5 kg (overall down 16.8 kg/16% in ~3.5 weeks).  Patient had severe lymphedema pre-transplant but continues to have moderate generalized/LE edema on exam.  Likely that this weight loss is a combination of dry/fluid losses given nutrition hx (See above).      Skin beneath nasal bridle was inspected. Bridle string seems taut  and redness between nostrils and under string noted.  Will have unit RD follow up with this when patient alert to evaluate further.      MALNUTRITION  % Intake: Does not meet criteria recently, TF meeting majority of needs  % Weight Loss: > 2% in 1 week (severe)  Subcutaneous Fat Loss: Temporal, Facial region:  Moderate-Severe (visualized only today - patient asleep)  Muscle Loss: Temporal, Scapular bone, Thoracic region (clavicle, acromium bone, deltoid, trapezius, pectoral), Upper arm (bicep, tricep), Lower arm  (forearm):  Moderate-severe (per previous)  Fluid Accumulation/Edema: Mild (legs, knees), Moderate (feet)  Malnutrition Diagnosis: Severe malnutrition in the context of acute on chronic illness    Previous Goals   Total avg nutritional intake to meet a minimum of 30 kcal/kg and 1.5g/kg protein daily (per DW 60 kg)  Evaluation: Not met for calories    Previous Nutrition Diagnosis  Inadequate protein-energy intake  Evaluation: No change    CURRENT NUTRITION DIAGNOSIS  Inadequate oral intake related to poor appetite, AMS AEB patient eating mostly bites of food per RN report, requiring TF to meet full nutritional needs.     INTERVENTIONS  Implementation  Attempted to meet with patient, however patient sound asleep.    Spoke with PA re: cycling TF, adjusted TF orders.  Discussed with bedside RN.   Ordered calorie counts given patient's improvements seen today.     Goals  Total avg nutritional intake to meet a minimum of 30 kcal/kg and 1.5g/kg protein daily (per DW 60 kg)    Monitoring/Evaluation  Progress toward goals will be monitored and evaluated per protocol.    Lela Ng MS, RD, LD  Pager [phone]

## 2019-09-17 NOTE — PLAN OF CARE
AVSS, reporting some abdominal cramping, not requesting intervention.  Patient continues to be confused, but doing better even without attendant at bedside.  Patient up with assist of one, gait belt, and walker, voiding, passing flatus, having bowel loose movements (continent of bowel and bladder), poor oral intake, and tube feeds continue at 45ml/hr via NJ.  Hgb=6.8 and one unit PRBCs transfused without incident.  Scheduled meds given as ordered.  Continue to monitor, treat as ordered, notify team with changes.

## 2019-09-17 NOTE — PLAN OF CARE
[id number]:  VSS, sats 89% when sleeping, placed on 1L NC. TF continue at 45cc/hr. Up in chair for dinner currently, minimal appetite. PIV SL'd.  and mother at bedside. Up to BSC w/SBA, loose BM's continue. BA on for safety, but calling appropriately. Oriented to place/self/time. Forgetful/confused intermittently. Possible discharge to TCU soon pending staying off a sitter.

## 2019-09-17 NOTE — PLAN OF CARE
Edema 7A: Recommend continued use of LE compression wraps to further manage edema and increase ease with functional mobility. Reapplication of GCB from MTPs to knee creases. Remove wraps if causing pain/numbness or become soiled.

## 2019-09-17 NOTE — PLAN OF CARE
"/64 (BP Location: Right arm)   Pulse 95   Temp 98.8  F (37.1  C) (Axillary)   Resp 16   Wt 87.6 kg (193 lb 3.2 oz)   SpO2 95%   BMI 35.34 kg/m      5045-8826: Pt admitted for liver txp, POD# 29, d/t ANGULO. AVSS on 2L NC.  visiting this evening.  Neuro: Pt was only disoriented to time this evening, thought it was August 19, 2019. However, is not usually appropriate to situation, continues to be impulsive, needing frequent reminders to not pull her lines and wait for help with mobility. Very emotionally labile.   Cardiac: WDL.  Resp: WDL, encourage IS use. O2 sats spontaneously dropped to low 80s on 2L NC while pt was resting, new sensor applied, pt awakened, and increased O2 to 3L, came up to low 90s after ~5-10 minutes, weaned as possible, now satting >90% on 2L. On continuous pulse ox.   GI/: One BM this evening. Voiding adequate amounts. Continent of B&B this shift.  Diet/Appetite: 2 g K diet with no appetite. Needs assistance eating. Took meds with Boost juice and drank ~50% one carton. Ate 10% of dinner. On continuous TFs via NJ @ 45/hr with Q4 30 ml H2O flushes. Took all PO meds without a problem this evening.   Endocrine: NA.  Skin: Clamshell incision sutured, SILVIA, CDI. L OBED site sutured, CDI, SILVIA. Bruising throughout body. Blanchable redness on coccyx, barrier cream applied and pt repositioned frequently.  Access: PIV x1 SLd.   Drains: NA.  Activity: Up A1 + W this evening. Was in recliner for a few hours this evening.  Pain: Pt endorses pain in her abdomen and L knee with movement. Heat and cold used prn. Writer got a call from sitter at 2150 about pt c/o severe abd pain. Writer went to assess pt, pt c/o 10/10 abd pain that was constant, stabbing, pt stated she \"thought she was on her way out.\" Writer notified on-call, came to assess pt and determined it is cramping d/t C. Diff. No interventions at this time d/t wanting to avoid further confusion with narcotics.   Plan: Continue trending " liver labs (which are slowly trending up), encourage PO intake, improve mentation and strength. Will continue with plan of care and notify team of any changes.?

## 2019-09-17 NOTE — PROGRESS NOTES
Transplant Social Work Services Progress Note      Date of Initial Social Work Evaluation: 8/29/19  Collaborated with: Liver Transplant Team    Data: Nicci's attendant has been discontinued and she may be medically stable for discharge to rehab (ARU versus TCU) tomorrow.  Intervention: I updated patient's  Hugo and [facility] admissions (Ceci).  Assessment: Nicci's cognition is improved today per nursing and .  [facility] admissions is assessing patient to determine if she is appropriate for TCU or ARU.  Education provided by SW: [facility] and [facility]U  Plan:    Discharge Plans in Progress: [facility] admissions is following.    Barriers to d/c plan: medical stability    Follow up Plan: I will continue to follow to assist with patient's transfer to rehab when she is medically stable.        ARIEL Valerio, [facility]  Liver Transplant   Phone [phone]  Pager [phone]

## 2019-09-17 NOTE — PROGRESS NOTES
Transplant Surgery  Inpatient Daily Progress Note  09/17/2019    Assessment & Plan: Nicci Pemberton is a 60 yo female with a past medical history significant for end stage liver disease 2/2 ANGULO and alpha 1 anti-trypsin deficiency now s/p DD OLT on 8/18/19.     Graft function: DD OLT 8/18/19 without stent-POD #30  Transaminases, AP stable. Tbili had been uptrending, peaked at 1.7 but improved to 1.4 today.     Immunosuppression management:   Tac - stopped due to prolonged delirium  CSA started 9/2.  mg BID to titrate to a goal of 200-300. Level 9/17 203.  MMF increased to 1000 mg BID during transition of CNI. Due to nausea,  changed to Myfortic 540 mg BID.   Pred 5mg when transitioning IS meds, now discontinued.   Complexity of management: Medium. Contributing factors: anemia and inductionencephalopathy.     Hematology: Acute blood loss anemia. Transfusion goal hgb > 7. Hgb 6.8 today, transfuse 1 unit PRBC. Last transfused 3 units 8/20, 1 unit 9/8 and 1 unit 9/17.     HEENT: Epistaxis: due to friable mucosa, managed with packing, now resolved.     Cardiorespiratory: Patient extubated 8/24. RA. Encourage IS and ambulation.     Neuro:   Toxic and metabolic encephalopathy: likely secondary to poor sleep, drugs (tac, pain medication), acute illness. Ordered melatonin to help patient sleep, patient's mental status has seemed to be improving overall.  -MRI head 9/3- results unremarkable. Discontinued Tacrolimus. Consulted neurology, recommended LP to rule out infectious etiology and Video EEG monitoring completed, no evidence of seizures.    9/5 LP, results thus far negative for infection. WBC 1. Protein 44 and glucose 51. Negative CSF studies: cryptococcal, enterovirus, HSV, VZV.    -VBG reviewed. Avoid medications that cause respiratory depression.   -Continue scheduled melatonin at bedtime.   -work on day/night cycles  -discontinue sitter today as patient is A&O, demonstrated nursing call button and is not pulling  at FT     GI/Nutrition: Regular diet, NJ in place. Continue EN-Change to Nepro.   Diarrhea -  New onset, fever, and abdominal pain  C diff- C diff positive 9/17, started PO Vanco x 10 days    Endocrine:   Steroid induced hyperglycemia- resolved    Fluid/Electrolytes:   GAMAL- CRRT stopped and patient has required no further HD. Incontinent of urine, so difficult to quantify, but Cr now normalized. SCr 0.8  Metabolic alkalosis, with compensated respiratory acidosis: CO2  30's. Nephrology following, avoiding loop diuretics  Hyperkalemia: K 4.2-improved after transitioning to Nepro EN.    : Incontinent    Infectious disease: low grade fever, 100.3 yesterday morning, AF since with PO Vanco initiation   WBC WNL  UA/: moderate LE, 3 epis. UC pending.   Ppx with micafungin x 10 days completed, zosyn completed.   PPx with dapsone and valcyte.    Prophylaxis: Heparin subcutaneous TID.     Activity: OOB to chair today. PT/OT.     Disposition: 7A, ready for TCU once off sitter 24 hours, likely tomorrow morning    Medical Decision Making: Medium  Subsequent visit 57753 (moderate level decision making)    VEENA/Fellow/Resident Provider: Juliet Philippe PA-C [id number]    Faculty: Hui Cross MD  ____________________________________________________________  Transplant History: Admitted 8/16/2019 for acute decompensated ANGULO.   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    8/18/2019 (Liver), Postoperative day: 30     Interval History:   No complaints. More oriented today.     ROS:   A 10-point review of systems was negative except as noted above.    Curent Meds:    aspirin  325 mg Oral Daily     carboxymethylcellulose PF  2 drop Both Eyes Q6H     clotrimazole  1 Molly Buccal 4x Daily     cycloSPORINE modified  225 mg Oral BID IS     dapsone  100 mg Oral Daily     famotidine  20 mg Oral Daily     heparin  5,000 Units Subcutaneous Q8H     levothyroxine  175 mcg Oral Daily     magnesium oxide  400 mg  Oral Daily     melatonin  3 mg Oral At Bedtime     multivitamins w/minerals  15 mL Per Feeding Tube Daily     mycophenolic acid  540 mg Oral BID IS     protein modular  1 packet Per Feeding Tube Daily     sodium chloride  1 spray Both Nostrils Q4H     sodium chloride (PF)  3 mL Intravenous Q8H     sodium chloride (PF)  3 mL Intracatheter Q8H     valGANciclovir  450 mg Oral Daily     vancomycin  125 mg Oral 4x Daily     vitamin D3  1,000 Units Oral BID       Physical Exam:     Admit Weight: 104.3 kg (230 lb)    Current Vitals:   /62 (BP Location: Left arm)   Pulse 95   Temp 98.2  F (36.8  C) (Oral)   Resp 16   Wt 89.5 kg (197 lb 5 oz)   SpO2 92%   BMI 36.09 kg/m      Vital sign ranges:    Temp:  [98.1  F (36.7  C)-99.9  F (37.7  C)] 98.2  F (36.8  C)  Pulse:  [91-95] 95  Heart Rate:  [] 91  Resp:  [16-20] 16  BP: (103-119)/(62-84) 109/62  SpO2:  [83 %-99 %] 92 %  Patient Vitals for the past 24 hrs:   BP Temp Temp src Pulse Heart Rate Resp SpO2 Weight   09/17/19 0958 109/62 98.2  F (36.8  C) Oral -- 91 16 92 % --   09/17/19 0948 103/62 98.4  F (36.9  C) Oral -- 94 16 94 % --   09/17/19 0900 -- -- -- -- -- -- 95 % --   09/17/19 0800 119/71 98.9  F (37.2  C) Oral -- 88 16 94 % --   09/17/19 0321 112/62 98.1  F (36.7  C) Oral -- 92 20 97 % --   09/17/19 0248 -- -- -- -- -- -- -- 89.5 kg (197 lb 5 oz)   09/17/19 0000 115/84 98.8  F (37.1  C) Oral -- 97 20 95 % --   09/16/19 1900 113/64 98.8  F (37.1  C) Axillary 95 -- 16 95 % --   09/16/19 1634 115/75 98.2  F (36.8  C) Oral 91 -- 16 97 % --   09/16/19 1620 -- -- -- -- -- -- 95 % --   09/16/19 1615 -- -- -- -- -- -- 99 % --   09/16/19 1610 -- -- -- -- -- -- 90 % --   09/16/19 1600 -- -- -- -- -- -- (!) 83 % --   09/16/19 1236 108/71 99.9  F (37.7  C) Oral -- 100 18 94 % --     General Appearance: NAD  HEENT: Feeding tube in place with EN infusing  Skin: normal, warm, scattered bruising  Heart: RRR  Lungs: NLB on RA  Abdomen: soft, nondistended,  incision  sutured, c/d/i.   : Wearing depends, incontinent at times  Extremities: edema: present bilaterally. 1-2+. Wearing lymphedema wraps.  Neurologic: alert, oriented x2. Tremor to LUE.     Data:   CMP  Recent Labs   Lab 09/17/19  0544 09/16/19  0531    136   POTASSIUM 4.2 4.3   CHLORIDE 99 99   CO2 30 30   * 99   BUN 28 28   CR 0.77 0.85   GFRESTIMATED 85 75   GFRESTBLACK >90 87   JACOB 8.6 8.5   MAG 1.8 1.7   PHOS 4.2 4.3   ALBUMIN 2.1* 2.2*   BILITOTAL 1.4* 1.4*   ALKPHOS 176* 196*   AST 42 44   ALT 43 43     CBC  Recent Labs   Lab 09/17/19  0544 09/16/19  0531   HGB 6.8* 7.8*   WBC 7.5 8.1    169     COAGS  No lab results found in last 7 days.    Invalid input(s): XA

## 2019-09-17 NOTE — PLAN OF CARE
OT/7A: Discharge Planner OT   Patient plan for discharge: Not discussed this session  Current status: IND recalled 0/3 abdominal precautions. Min A x1 required tx supine>EOB via logroll and EOB>stand. Frequent verbal cues, tactile cues for hand placement, and Min A x2 required during SPT to commode x2 and SPT to recliner. Dependent in standing pericares. Max A and verbal cues required during Connect 4 seated cognitive activity. VSS on RA.  Barriers to return to prior living situation: activity tolerance, weakness, cognition, abdominal precautions  Recommendations for discharge: TCU vs ARU   Rationale for recommendations: Pt presents below baseline and would benefit from further skilled interdisciplinary therapy to address activity tolerance, weakness, and cognition to improve ADL/IADL function. However, pt's activity tolerance and cognition continue to vary. As pt progresses pt will tolerate 3hrs/day therapy, is motivated, and has strong social support.       Entered by: Jasmyne River 09/17/2019 3:46 PM

## 2019-09-17 NOTE — PLAN OF CARE
Discharge Planner PT  7A  Patient plan for discharge: Not stated  Current status: Pt with improved activity tolerance and direction following this date, remains confused though more appropriate responses today. Standing LE exercises performed to promote increased strength and endurance. Pt performs sit<>stand x2 and transfer to [facility] with Rafa of 1-2 and FWW, tolerates standing up to 3 minutes. Total A for standing pericares.   Barriers to return to prior living situation: Medical status, impaired cognition, decreased activity tolerance, decreased activity tolerance, level of assist for mobility  Recommendations for discharge: TCU vs ARC  Rationale for recommendations: Pt remains significantly below baseline in regards to functional mobility and would benefit from continued skilled interdisciplinary therapy services to progress safety and independence. Pt continues to present with varying cognition and tolerance for therapy, if this continues to improve pt would be a great ARC candidate.       Entered by: Naida Khalil 09/17/2019 10:00 AM

## 2019-09-17 NOTE — PROGRESS NOTES
-- Message is from the [facility]--    Reason for Call: nurse navigator with [facility] wants to let doctor know that  patient only took plavix and eliquis last night 9/16/19. Patient states he is having adverse reactions from medication and does not feel well, and daughter said that patient will not likely be compliant with medication regimen. Nurse wants to know are there any other options. Nurse also left message with . Please return call at [phone].      Caller Information       Type Contact Phone    09/17/2019 10:01 AM Phone (Incoming) rochelle (Nurse) [phone]     nurse navigator with [facility]          Alternative phone number: n/a    Turnaround time given to caller:   \"This message will be sent to [state Provider's name]. The clinical team will fulfill your request as soon as they review your message.\"     9:43 AM  August 19, 2019  Disregard this encounter.  Anita Carey, RN, BA  On Call Organ Coordinator

## 2019-09-17 NOTE — PLAN OF CARE
VS: VSS, afebrile, on 2L oxygen via NC  Mental Status: A&Ox1 (self only)/calm, cooperative   Cardiac: WNL, denies chest pain  Respiratory: LS clear, diminished in bases, on 2L oxygen via NC to maintain oxygen saturations over 90%   GI/: voiding adequate amount urine via BSC, UA/UC sent/culture pending; abdomen obese, + bowel tones, denies nausea, clam-shell incision with sutures, on BM this shift/continues on scheduled vancomycin for + c.diff   Pain: denies pain   Diet: 2 g K diet, continuous tube feed at goal   Mobility: Up with 1-2, GB, to BSC  Treatment: Attendant for history of pulling out NJ tubes/impulsive behavior, await resolution of encephalopathy, vanco for c.diff    DC plan: TBD; PT/OT and SW following

## 2019-09-18 ENCOUNTER — TELEPHONE (OUTPATIENT)
Dept: TRANSPLANT | Facility: CLINIC | Age: 59
End: 2019-09-18

## 2019-09-18 ENCOUNTER — APPOINTMENT (OUTPATIENT)
Dept: PHYSICAL THERAPY | Facility: CLINIC | Age: 59
DRG: 005 | End: 2019-09-18
Payer: COMMERCIAL

## 2019-09-18 LAB
ALBUMIN SERPL-MCNC: 2.2 G/DL (ref 3.4–5)
ALP SERPL-CCNC: 185 U/L (ref 40–150)
ALT SERPL W P-5'-P-CCNC: 43 U/L (ref 0–50)
ANION GAP SERPL CALCULATED.3IONS-SCNC: 6 MMOL/L (ref 3–14)
AST SERPL W P-5'-P-CCNC: 40 U/L (ref 0–45)
BACTERIA SPEC CULT: NORMAL
BASOPHILS # BLD AUTO: 0 10E9/L (ref 0–0.2)
BASOPHILS NFR BLD AUTO: 0.2 %
BILIRUB DIRECT SERPL-MCNC: 0.9 MG/DL (ref 0–0.2)
BILIRUB SERPL-MCNC: 1.2 MG/DL (ref 0.2–1.3)
BUN SERPL-MCNC: 31 MG/DL (ref 7–30)
CALCIUM SERPL-MCNC: 8.7 MG/DL (ref 8.5–10.1)
CHLORIDE SERPL-SCNC: 98 MMOL/L (ref 94–109)
CO2 SERPL-SCNC: 30 MMOL/L (ref 20–32)
CREAT SERPL-MCNC: 0.78 MG/DL (ref 0.52–1.04)
CYCLOSPORINE BLD LC/MS/MS-MCNC: 206 UG/L (ref 50–400)
DIFFERENTIAL METHOD BLD: ABNORMAL
EOSINOPHIL # BLD AUTO: 0.6 10E9/L (ref 0–0.7)
EOSINOPHIL NFR BLD AUTO: 12.2 %
ERYTHROCYTE [DISTWIDTH] IN BLOOD BY AUTOMATED COUNT: 18.6 % (ref 10–15)
GFR SERPL CREATININE-BSD FRML MDRD: 83 ML/MIN/{1.73_M2}
GLUCOSE SERPL-MCNC: 94 MG/DL (ref 70–99)
HCT VFR BLD AUTO: 25.4 % (ref 35–47)
HGB BLD-MCNC: 7.4 G/DL (ref 11.7–15.7)
IMM GRANULOCYTES # BLD: 0.2 10E9/L (ref 0–0.4)
IMM GRANULOCYTES NFR BLD: 3.3 %
LYMPHOCYTES # BLD AUTO: 0.3 10E9/L (ref 0.8–5.3)
LYMPHOCYTES NFR BLD AUTO: 5.2 %
Lab: NORMAL
MAGNESIUM SERPL-MCNC: 1.8 MG/DL (ref 1.6–2.3)
MCH RBC QN AUTO: 29.6 PG (ref 26.5–33)
MCHC RBC AUTO-ENTMCNC: 29.1 G/DL (ref 31.5–36.5)
MCV RBC AUTO: 102 FL (ref 78–100)
MONOCYTES # BLD AUTO: 0.6 10E9/L (ref 0–1.3)
MONOCYTES NFR BLD AUTO: 11.7 %
NEUTROPHILS # BLD AUTO: 3.5 10E9/L (ref 1.6–8.3)
NEUTROPHILS NFR BLD AUTO: 67.4 %
NRBC # BLD AUTO: 0 10*3/UL
NRBC BLD AUTO-RTO: 0 /100
PHOSPHATE SERPL-MCNC: 3.8 MG/DL (ref 2.5–4.5)
PLATELET # BLD AUTO: 157 10E9/L (ref 150–450)
POTASSIUM SERPL-SCNC: 4.3 MMOL/L (ref 3.4–5.3)
PROT SERPL-MCNC: 5.5 G/DL (ref 6.8–8.8)
RBC # BLD AUTO: 2.5 10E12/L (ref 3.8–5.2)
SODIUM SERPL-SCNC: 134 MMOL/L (ref 133–144)
SPECIMEN SOURCE: NORMAL
TME LAST DOSE: NORMAL H
WBC # BLD AUTO: 5.2 10E9/L (ref 4–11)

## 2019-09-18 PROCEDURE — 82248 BILIRUBIN DIRECT: CPT | Performed by: TRANSPLANT SURGERY

## 2019-09-18 PROCEDURE — 25000131 ZZH RX MED GY IP 250 OP 636 PS 637: Performed by: NURSE PRACTITIONER

## 2019-09-18 PROCEDURE — 25000128 H RX IP 250 OP 636: Performed by: PHYSICIAN ASSISTANT

## 2019-09-18 PROCEDURE — 27210432 ZZH NUTRITION PRODUCT RENAL BASIC LITER

## 2019-09-18 PROCEDURE — 36415 COLL VENOUS BLD VENIPUNCTURE: CPT | Performed by: TRANSPLANT SURGERY

## 2019-09-18 PROCEDURE — 85025 COMPLETE CBC W/AUTO DIFF WBC: CPT | Performed by: TRANSPLANT SURGERY

## 2019-09-18 PROCEDURE — 25000131 ZZH RX MED GY IP 250 OP 636 PS 637: Performed by: PHYSICIAN ASSISTANT

## 2019-09-18 PROCEDURE — 25000132 ZZH RX MED GY IP 250 OP 250 PS 637: Performed by: SURGERY

## 2019-09-18 PROCEDURE — 25000132 ZZH RX MED GY IP 250 OP 250 PS 637: Performed by: TRANSPLANT SURGERY

## 2019-09-18 PROCEDURE — 80053 COMPREHEN METABOLIC PANEL: CPT | Performed by: TRANSPLANT SURGERY

## 2019-09-18 PROCEDURE — 25000132 ZZH RX MED GY IP 250 OP 250 PS 637: Performed by: PHYSICIAN ASSISTANT

## 2019-09-18 PROCEDURE — 97530 THERAPEUTIC ACTIVITIES: CPT | Mod: GP

## 2019-09-18 PROCEDURE — 12000026 ZZH R&B TRANSPLANT

## 2019-09-18 PROCEDURE — 83735 ASSAY OF MAGNESIUM: CPT | Performed by: TRANSPLANT SURGERY

## 2019-09-18 PROCEDURE — 97116 GAIT TRAINING THERAPY: CPT | Mod: GP

## 2019-09-18 PROCEDURE — 25000132 ZZH RX MED GY IP 250 OP 250 PS 637

## 2019-09-18 PROCEDURE — 84100 ASSAY OF PHOSPHORUS: CPT | Performed by: TRANSPLANT SURGERY

## 2019-09-18 PROCEDURE — 80158 DRUG ASSAY CYCLOSPORINE: CPT | Performed by: PHYSICIAN ASSISTANT

## 2019-09-18 PROCEDURE — 25000132 ZZH RX MED GY IP 250 OP 250 PS 637: Performed by: NURSE PRACTITIONER

## 2019-09-18 RX ADMIN — VANCOMYCIN HYDROCHLORIDE 125 MG: KIT at 08:18

## 2019-09-18 RX ADMIN — SALINE NASAL SPRAY 1 SPRAY: 1.5 SOLUTION NASAL at 08:18

## 2019-09-18 RX ADMIN — VANCOMYCIN HYDROCHLORIDE 125 MG: KIT at 13:06

## 2019-09-18 RX ADMIN — VANCOMYCIN HYDROCHLORIDE 125 MG: KIT at 19:47

## 2019-09-18 RX ADMIN — CYCLOSPORINE 225 MG: 100 SOLUTION ORAL at 18:58

## 2019-09-18 RX ADMIN — MAGNESIUM OXIDE TAB 400 MG (241.3 MG ELEMENTAL MG) 400 MG: 400 (241.3 MG) TAB at 13:06

## 2019-09-18 RX ADMIN — CYCLOSPORINE 225 MG: 100 SOLUTION ORAL at 08:18

## 2019-09-18 RX ADMIN — MYCOPHENOLIC ACID 540 MG: 360 TABLET, DELAYED RELEASE ORAL at 18:58

## 2019-09-18 RX ADMIN — CLOTRIMAZOLE 1 TROCHE: 10 LOZENGE ORAL at 13:03

## 2019-09-18 RX ADMIN — CLOTRIMAZOLE 1 TROCHE: 10 LOZENGE ORAL at 08:19

## 2019-09-18 RX ADMIN — ASPIRIN 325 MG ORAL TABLET 325 MG: 325 PILL ORAL at 08:19

## 2019-09-18 RX ADMIN — MULTIVITAMIN 15 ML: LIQUID ORAL at 08:19

## 2019-09-18 RX ADMIN — VANCOMYCIN HYDROCHLORIDE 125 MG: KIT at 15:55

## 2019-09-18 RX ADMIN — Medication 5000 UNITS: at 19:47

## 2019-09-18 RX ADMIN — Medication 5000 UNITS: at 03:57

## 2019-09-18 RX ADMIN — LEVOTHYROXINE SODIUM 175 MCG: 175 TABLET ORAL at 08:19

## 2019-09-18 RX ADMIN — VALGANCICLOVIR 450 MG: 450 TABLET, FILM COATED ORAL at 08:19

## 2019-09-18 RX ADMIN — CLOTRIMAZOLE 1 TROCHE: 10 LOZENGE ORAL at 15:55

## 2019-09-18 RX ADMIN — CLOTRIMAZOLE 1 TROCHE: 10 LOZENGE ORAL at 19:47

## 2019-09-18 RX ADMIN — FAMOTIDINE 20 MG: 20 TABLET ORAL at 08:19

## 2019-09-18 RX ADMIN — MYCOPHENOLIC ACID 540 MG: 360 TABLET, DELAYED RELEASE ORAL at 08:19

## 2019-09-18 RX ADMIN — MELATONIN 1000 UNITS: at 08:19

## 2019-09-18 RX ADMIN — Medication 1 PACKET: at 08:17

## 2019-09-18 RX ADMIN — MELATONIN TAB 3 MG 3 MG: 3 TAB at 19:47

## 2019-09-18 RX ADMIN — DAPSONE 100 MG: 25 TABLET ORAL at 08:18

## 2019-09-18 RX ADMIN — MELATONIN 1000 UNITS: at 19:47

## 2019-09-18 RX ADMIN — Medication 5000 UNITS: at 13:06

## 2019-09-18 ASSESSMENT — ACTIVITIES OF DAILY LIVING (ADL)
ADLS_ACUITY_SCORE: 16
ADLS_ACUITY_SCORE: 26
ADLS_ACUITY_SCORE: 25
ADLS_ACUITY_SCORE: 25
ADLS_ACUITY_SCORE: 27
ADLS_ACUITY_SCORE: 26

## 2019-09-18 NOTE — PLAN OF CARE
Discharge Planner PT  7A  Patient plan for discharge: Rehab  Current status: Pt with much improved activity tolerance and mobility independence this date. Pt performs sit<>stand with minAx1 and FWW, ambulates to and from bathroom and performs toilet transfer with Rafa of 1-2 and FWW, heavy cueing for safe bathroom negotiation, total A for pericares. Pt ambulates 150ft + 50ft with CGA, FWW, and chair follow, requires seated rest break between bouts 2/2 fatigue, decreased gait speed throughout. Increasing B UE reliance on FWW as distance increases 2/2 LE fatigue.   Barriers to return to prior living situation: Medical status, abdominal precautions, weakness, impaired balance, impaired cognition  Recommendations for discharge: ARC  Rationale for recommendations: Pt is well below baseline in regards to functional mobility and would benefit from continued skilled intensive interdisciplinary therapy services to progress safety and independence with functional mobility. Pt has made continued progress in regards to mobility and activity tolerance and is motivated to participate. Anticipate pt would tolerate 3hrs of therapy per day.       Entered by: Naida Khalil 09/18/2019 11:24 AM

## 2019-09-18 NOTE — PROGRESS NOTES
Transplant Surgery  Inpatient Daily Progress Note  09/18/2019    Assessment & Plan: Nicci Pemberton is a 58 yo female with a past medical history significant for end stage liver disease 2/2 ANGULO and alpha 1 anti-trypsin deficiency now s/p DD OLT on 8/18/19.     Graft function: DD OLT 8/18/19 without stent-POD #31  Transaminases, AP stable. Tbili had been uptrending, peaked at 1.7 but improved to 1.2 today.     Immunosuppression management:   Tac - stopped due to prolonged delirium  CSA started 9/2.  mg BID to titrate to a goal of 200-300. Level 9/18 206 (12 hour trough).  MMF increased to 1000 mg BID during transition of CNI. Due to nausea, changed to Myfortic 540 mg BID.   Pred 5mg when transitioning IS meds, now discontinued.   Complexity of management: Medium. Contributing factors: anemia and inductionencephalopathy.     Hematology: Acute blood loss anemia. Transfusion goal hgb > 7. Hgb 7.4 today s/p transfusion yesterday. Last transfused 3 units 8/20, 1 unit 9/8 and 1 unit 9/17.     HEENT: Epistaxis: due to friable mucosa, managed with packing, now resolved.     Cardiorespiratory: Patient extubated 8/24. RA. Encourage IS and ambulation.     Neuro:   Toxic and metabolic encephalopathy: likely secondary to poor sleep, drugs (tac, pain medication), acute illness. Ordered melatonin to help patient sleep, patient's mental status has seemed to be improving overall.  -MRI head 9/3- results unremarkable. Discontinued Tacrolimus. Consulted neurology, recommended LP to rule out infectious etiology and Video EEG monitoring completed, no evidence of seizures.    9/5 LP, results thus far negative for infection. WBC 1. Protein 44 and glucose 51. Negative CSF studies: cryptococcal, enterovirus, HSV, VZV.    -VBG reviewed. Avoid medications that cause respiratory depression.   -Continue scheduled melatonin at bedtime.   -work on day/night cycles  -discontinued sitter 9/17 AM as patient is A&O, demonstrated nursing call  button and is not pulling at FT     GI/Nutrition: Regular diet, NJ in place. Continue EN-Changed to Nepro.   Diarrhea -  New onset, fever, and abdominal pain  C diff- C diff positive 9/17, started PO Vanco x 10 days    Endocrine:   Steroid induced hyperglycemia- resolved    Fluid/Electrolytes:   GAMAL- CRRT stopped and patient has required no further HD. Incontinent of urine, so difficult to quantify, but Cr now normalized. SCr 0.8  Metabolic alkalosis, with compensated respiratory acidosis: CO2  30's. Nephrology following, avoiding loop diuretics  Hyperkalemia: K 4.3-improved after transitioning to Nepro EN.    : Incontinent    Infectious disease: AF since with PO Vanco initiation   WBC WNL  UA/: moderate LE, 3 epis. UC with mixed edith.   Ppx with micafungin x 10 days completed, zosyn completed.   PPx with dapsone and valcyte.    Prophylaxis: Heparin subcutaneous TID.     Activity: OOB to chair today. PT/OT.     Disposition: 7A, ready to discharge to ARU on 9/17, will likely have a bed available on Friday     Medical Decision Making: Medium  Subsequent visit 81415 (moderate level decision making)    VEENA/Fellow/Resident Provider: Juliet Philippe PA-C [id number]    Faculty: Hui Cross MD  ____________________________________________________________  Transplant History: Admitted 8/16/2019 for acute decompensated ANGULO.   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    8/18/2019 (Liver), Postoperative day: 31     Interval History:   No complaints. Oriented.     ROS:   A 10-point review of systems was negative except as noted above.    Curent Meds:    aspirin  325 mg Oral Daily     clotrimazole  1 Molly Buccal 4x Daily     cycloSPORINE modified  225 mg Oral BID IS     dapsone  100 mg Oral Daily     famotidine  20 mg Oral Daily     heparin  5,000 Units Subcutaneous Q8H     levothyroxine  175 mcg Oral Daily     magnesium oxide  400 mg Oral Daily     melatonin  3 mg Oral At Bedtime      multivitamins w/minerals  15 mL Per Feeding Tube Daily     mycophenolic acid  540 mg Oral BID IS     protein modular  1 packet Per Feeding Tube Daily     sodium chloride  1 spray Both Nostrils Q4H     sodium chloride (PF)  3 mL Intravenous Q8H     sodium chloride (PF)  3 mL Intracatheter Q8H     valGANciclovir  450 mg Oral Daily     vancomycin  125 mg Oral 4x Daily     vitamin D3  1,000 Units Oral BID       Physical Exam:     Admit Weight: 104.3 kg (230 lb)    Current Vitals:   /66 (BP Location: Right arm)   Pulse 94   Temp 98.2  F (36.8  C) (Oral)   Resp 16   Wt 87.8 kg (193 lb 9.6 oz)   SpO2 96%   BMI 35.41 kg/m      Vital sign ranges:    Temp:  [97.5  F (36.4  C)-98.6  F (37  C)] 98.2  F (36.8  C)  Heart Rate:  [85-94] 86  Resp:  [16-18] 16  BP: (107-118)/(66-81) 107/66  SpO2:  [89 %-97 %] 96 %  Patient Vitals for the past 24 hrs:   BP Temp Temp src Heart Rate Resp SpO2 Weight   09/18/19 1215 107/66 98.2  F (36.8  C) Oral 86 16 96 % --   09/18/19 0837 118/81 97.5  F (36.4  C) Oral 85 18 93 % --   09/18/19 0300 113/66 98.6  F (37  C) Oral 93 16 94 % 87.8 kg (193 lb 9.6 oz)   09/18/19 0030 107/74 98.4  F (36.9  C) Oral 94 16 96 % --   09/17/19 1812 -- -- -- -- -- 97 % --   09/17/19 1800 -- -- -- -- -- (!) 89 % --   09/17/19 1549 107/72 97.8  F (36.6  C) Oral 87 16 90 % --     General Appearance: NAD  HEENT: Feeding tube in place with EN infusing  Skin: normal, warm, scattered bruising  Heart: RRR  Lungs: NLB on RA  Abdomen: soft, nondistended,  incision sutured, c/d/i.   : Wearing depends, incontinent at times  Extremities: edema: present bilaterally. 1+.  Neurologic: alert, oriented x2. Tremor to LUE.     Data:   CMP  Recent Labs   Lab 09/18/19  0614 09/17/19  0544    135   POTASSIUM 4.3 4.2   CHLORIDE 98 99   CO2 30 30   GLC 94 105*   BUN 31* 28   CR 0.78 0.77   GFRESTIMATED 83 85   GFRESTBLACK >90 >90   JACOB 8.7 8.6   MAG 1.8 1.8   PHOS 3.8 4.2   ALBUMIN 2.2* 2.1*   BILITOTAL 1.2 1.4*    ALKPHOS 185* 176*   AST 40 42   ALT 43 43     CBC  Recent Labs   Lab 09/18/19  0614 09/17/19  0544   HGB 7.4* 6.8*   WBC 5.2 7.5    173     COAGS  No lab results found in last 7 days.    Invalid input(s): XA

## 2019-09-18 NOTE — PROGRESS NOTES
Transplant Social Work Services Progress Note      Date of Initial Social Work Evaluation: 8/29/19  Collaborated with: Liver Transplant Team, [facility]    Data: Nicci is medically stable for discharge and Acute Rehab is being recommended.  Intervention: I met with Nicci and her  Hugo and provided an update.  Assessment: Nicci and her  Hugo are in agreement with transfer to Acute Rehab.  Education provided by SW: Process of transferring to Acute Rehab at [facility], Liver Transplant Support Group  Plan:    Discharge Plans in Progress: [facility]    Barriers to d/c plan: Acute rehab bed availability, potential for transfer on Friday    Follow up Plan: I will remain involved in patient's disposition plan.      ARIEL Valerio, [facility]  Liver Transplant   Phone [phone]  Pager [phone]

## 2019-09-18 NOTE — PLAN OF CARE
/66 (BP Location: Left arm)   Pulse 94   Temp 98.6  F (37  C) (Oral)   Resp 16   Wt 87.8 kg (193 lb 9.6 oz)   SpO2 94%   BMI 35.41 kg/m      Afeb., OVSS, on RA. Pt. Alert & Ox4, forgetful at times. Bed alarm on for safety. Pt. c/o incisional pain & decreased with cold pack. No c/o's nausea. Tube feeds at 45cc/hour via NJT. Right PIV saline locked. Pt. up with SBA & walker. Voided adequate amounts into commode, 1 loose stool this shift. Possible discharge to TCU if bed available. Continue to follow POC & notify team with change in status.     Statement Selected

## 2019-09-18 NOTE — PLAN OF CARE
AVSS, reporting some abdominal cramping, not requesting intervention.  Patient intermittently confused, but doing much better today, and bed alarm placed for patient safety.  Patient up with assist of one, gait belt, and walker, voiding, passing flatus, having bowel loose movements (continent of bowel and bladder), poor oral intake, and tube feeds continue at 45ml/hr via NJ.  Med card and lab book updated.  Scheduled meds given as ordered.  Continue to monitor, treat as ordered, notify team with changes.  Calorie counts to start tomorrow

## 2019-09-19 ENCOUNTER — COMMUNICATION - HEALTHEAST (OUTPATIENT)
Dept: OTHER | Facility: CLINIC | Age: 59
End: 2019-09-19

## 2019-09-19 PROBLEM — A04.72 C. DIFFICILE DIARRHEA: Status: ACTIVE | Noted: 2019-09-19

## 2019-09-19 PROBLEM — T38.0X5A STEROID-INDUCED HYPERGLYCEMIA: Status: ACTIVE | Noted: 2019-09-19

## 2019-09-19 PROBLEM — D84.9 IMMUNOSUPPRESSED STATUS (H): Status: ACTIVE | Noted: 2019-09-19

## 2019-09-19 PROBLEM — N17.9 AKI (ACUTE KIDNEY INJURY) (H): Status: ACTIVE | Noted: 2019-09-19

## 2019-09-19 PROBLEM — R73.9 STEROID-INDUCED HYPERGLYCEMIA: Status: ACTIVE | Noted: 2019-09-19

## 2019-09-19 PROBLEM — R04.0 EPISTAXIS: Status: ACTIVE | Noted: 2019-09-19

## 2019-09-19 PROBLEM — K72.10 END-STAGE LIVER DISEASE (H): Status: RESOLVED | Noted: 2019-08-16 | Resolved: 2019-09-19

## 2019-09-19 PROBLEM — E87.5 HYPERKALEMIA: Status: ACTIVE | Noted: 2019-09-19

## 2019-09-19 PROBLEM — Z94.4 STATUS POST LIVER TRANSPLANTATION (H): Status: ACTIVE | Noted: 2019-08-18

## 2019-09-19 PROBLEM — D62 ANEMIA DUE TO BLOOD LOSS, ACUTE: Status: ACTIVE | Noted: 2019-09-19

## 2019-09-19 PROBLEM — G93.41 METABOLIC ENCEPHALOPATHY: Status: ACTIVE | Noted: 2019-09-19

## 2019-09-19 LAB
ALBUMIN SERPL-MCNC: 2.4 G/DL (ref 3.4–5)
ALP SERPL-CCNC: 222 U/L (ref 40–150)
ALT SERPL W P-5'-P-CCNC: 45 U/L (ref 0–50)
ANION GAP SERPL CALCULATED.3IONS-SCNC: 4 MMOL/L (ref 3–14)
AST SERPL W P-5'-P-CCNC: 39 U/L (ref 0–45)
BASOPHILS # BLD AUTO: 0 10E9/L (ref 0–0.2)
BASOPHILS NFR BLD AUTO: 0.4 %
BILIRUB DIRECT SERPL-MCNC: 0.8 MG/DL (ref 0–0.2)
BILIRUB SERPL-MCNC: 1.3 MG/DL (ref 0.2–1.3)
BUN SERPL-MCNC: 32 MG/DL (ref 7–30)
CALCIUM SERPL-MCNC: 9.2 MG/DL (ref 8.5–10.1)
CHLORIDE SERPL-SCNC: 99 MMOL/L (ref 94–109)
CO2 SERPL-SCNC: 31 MMOL/L (ref 20–32)
CREAT SERPL-MCNC: 0.75 MG/DL (ref 0.52–1.04)
DIFFERENTIAL METHOD BLD: ABNORMAL
EOSINOPHIL # BLD AUTO: 0.8 10E9/L (ref 0–0.7)
EOSINOPHIL NFR BLD AUTO: 14.3 %
ERYTHROCYTE [DISTWIDTH] IN BLOOD BY AUTOMATED COUNT: 18.7 % (ref 10–15)
GFR SERPL CREATININE-BSD FRML MDRD: 87 ML/MIN/{1.73_M2}
GLUCOSE BLDC GLUCOMTR-MCNC: 106 MG/DL (ref 70–99)
GLUCOSE SERPL-MCNC: 93 MG/DL (ref 70–99)
HCT VFR BLD AUTO: 28.2 % (ref 35–47)
HGB BLD-MCNC: 8.6 G/DL (ref 11.7–15.7)
IMM GRANULOCYTES # BLD: 0.2 10E9/L (ref 0–0.4)
IMM GRANULOCYTES NFR BLD: 3.2 %
LYMPHOCYTES # BLD AUTO: 0.3 10E9/L (ref 0.8–5.3)
LYMPHOCYTES NFR BLD AUTO: 5.9 %
MAGNESIUM SERPL-MCNC: 1.7 MG/DL (ref 1.6–2.3)
MCH RBC QN AUTO: 31.2 PG (ref 26.5–33)
MCHC RBC AUTO-ENTMCNC: 30.5 G/DL (ref 31.5–36.5)
MCV RBC AUTO: 102 FL (ref 78–100)
MISCELLANEOUS TEST: NORMAL
MONOCYTES # BLD AUTO: 0.6 10E9/L (ref 0–1.3)
MONOCYTES NFR BLD AUTO: 12 %
NEUTROPHILS # BLD AUTO: 3.4 10E9/L (ref 1.6–8.3)
NEUTROPHILS NFR BLD AUTO: 64.2 %
NRBC # BLD AUTO: 0 10*3/UL
NRBC BLD AUTO-RTO: 0 /100
PHOSPHATE SERPL-MCNC: 4.4 MG/DL (ref 2.5–4.5)
PLATELET # BLD AUTO: 183 10E9/L (ref 150–450)
POTASSIUM SERPL-SCNC: 4.5 MMOL/L (ref 3.4–5.3)
PROT SERPL-MCNC: 5.8 G/DL (ref 6.8–8.8)
RBC # BLD AUTO: 2.76 10E12/L (ref 3.8–5.2)
SODIUM SERPL-SCNC: 134 MMOL/L (ref 133–144)
WBC # BLD AUTO: 5.3 10E9/L (ref 4–11)

## 2019-09-19 PROCEDURE — 25000132 ZZH RX MED GY IP 250 OP 250 PS 637: Performed by: SURGERY

## 2019-09-19 PROCEDURE — 82248 BILIRUBIN DIRECT: CPT | Performed by: TRANSPLANT SURGERY

## 2019-09-19 PROCEDURE — G0463 HOSPITAL OUTPT CLINIC VISIT: HCPCS | Mod: 25

## 2019-09-19 PROCEDURE — 84100 ASSAY OF PHOSPHORUS: CPT | Performed by: TRANSPLANT SURGERY

## 2019-09-19 PROCEDURE — 12000026 ZZH R&B TRANSPLANT

## 2019-09-19 PROCEDURE — 25000128 H RX IP 250 OP 636: Performed by: PHYSICIAN ASSISTANT

## 2019-09-19 PROCEDURE — 25000132 ZZH RX MED GY IP 250 OP 250 PS 637: Performed by: PHYSICIAN ASSISTANT

## 2019-09-19 PROCEDURE — 25000132 ZZH RX MED GY IP 250 OP 250 PS 637: Performed by: NURSE PRACTITIONER

## 2019-09-19 PROCEDURE — 25000131 ZZH RX MED GY IP 250 OP 636 PS 637: Performed by: PHYSICIAN ASSISTANT

## 2019-09-19 PROCEDURE — 36415 COLL VENOUS BLD VENIPUNCTURE: CPT | Performed by: TRANSPLANT SURGERY

## 2019-09-19 PROCEDURE — 27210432 ZZH NUTRITION PRODUCT RENAL BASIC LITER

## 2019-09-19 PROCEDURE — 00000146 ZZHCL STATISTIC GLUCOSE BY METER IP

## 2019-09-19 PROCEDURE — 97602 WOUND(S) CARE NON-SELECTIVE: CPT

## 2019-09-19 PROCEDURE — 83735 ASSAY OF MAGNESIUM: CPT | Performed by: TRANSPLANT SURGERY

## 2019-09-19 PROCEDURE — 25000132 ZZH RX MED GY IP 250 OP 250 PS 637: Performed by: TRANSPLANT SURGERY

## 2019-09-19 PROCEDURE — 85025 COMPLETE CBC W/AUTO DIFF WBC: CPT | Performed by: TRANSPLANT SURGERY

## 2019-09-19 PROCEDURE — 25000132 ZZH RX MED GY IP 250 OP 250 PS 637

## 2019-09-19 PROCEDURE — 80053 COMPREHEN METABOLIC PANEL: CPT | Performed by: TRANSPLANT SURGERY

## 2019-09-19 PROCEDURE — 25000131 ZZH RX MED GY IP 250 OP 636 PS 637: Performed by: NURSE PRACTITIONER

## 2019-09-19 RX ADMIN — MELATONIN 1000 UNITS: at 20:10

## 2019-09-19 RX ADMIN — ASPIRIN 325 MG ORAL TABLET 325 MG: 325 PILL ORAL at 08:44

## 2019-09-19 RX ADMIN — MAGNESIUM OXIDE TAB 400 MG (241.3 MG ELEMENTAL MG) 400 MG: 400 (241.3 MG) TAB at 12:50

## 2019-09-19 RX ADMIN — MELATONIN TAB 3 MG 3 MG: 3 TAB at 20:10

## 2019-09-19 RX ADMIN — LEVOTHYROXINE SODIUM 175 MCG: 175 TABLET ORAL at 08:44

## 2019-09-19 RX ADMIN — CLOTRIMAZOLE 1 TROCHE: 10 LOZENGE ORAL at 20:10

## 2019-09-19 RX ADMIN — CLOTRIMAZOLE 1 TROCHE: 10 LOZENGE ORAL at 08:44

## 2019-09-19 RX ADMIN — MELATONIN 1000 UNITS: at 08:44

## 2019-09-19 RX ADMIN — Medication 1 PACKET: at 08:43

## 2019-09-19 RX ADMIN — DAPSONE 100 MG: 25 TABLET ORAL at 08:44

## 2019-09-19 RX ADMIN — CYCLOSPORINE 225 MG: 100 SOLUTION ORAL at 08:44

## 2019-09-19 RX ADMIN — Medication 5000 UNITS: at 20:10

## 2019-09-19 RX ADMIN — VALGANCICLOVIR 450 MG: 450 TABLET, FILM COATED ORAL at 08:44

## 2019-09-19 RX ADMIN — VANCOMYCIN HYDROCHLORIDE 125 MG: KIT at 20:10

## 2019-09-19 RX ADMIN — VANCOMYCIN HYDROCHLORIDE 125 MG: KIT at 08:43

## 2019-09-19 RX ADMIN — VANCOMYCIN HYDROCHLORIDE 125 MG: KIT at 12:50

## 2019-09-19 RX ADMIN — CYCLOSPORINE 225 MG: 100 SOLUTION ORAL at 18:46

## 2019-09-19 RX ADMIN — Medication 5000 UNITS: at 03:33

## 2019-09-19 RX ADMIN — CLOTRIMAZOLE 1 TROCHE: 10 LOZENGE ORAL at 12:50

## 2019-09-19 RX ADMIN — VANCOMYCIN HYDROCHLORIDE 125 MG: KIT at 16:23

## 2019-09-19 RX ADMIN — MYCOPHENOLIC ACID 540 MG: 360 TABLET, DELAYED RELEASE ORAL at 08:44

## 2019-09-19 RX ADMIN — MULTIVITAMIN 15 ML: LIQUID ORAL at 08:44

## 2019-09-19 RX ADMIN — FAMOTIDINE 20 MG: 20 TABLET ORAL at 08:44

## 2019-09-19 RX ADMIN — Medication 5000 UNITS: at 12:50

## 2019-09-19 RX ADMIN — CLOTRIMAZOLE 1 TROCHE: 10 LOZENGE ORAL at 16:23

## 2019-09-19 RX ADMIN — MYCOPHENOLIC ACID 540 MG: 360 TABLET, DELAYED RELEASE ORAL at 18:46

## 2019-09-19 ASSESSMENT — ACTIVITIES OF DAILY LIVING (ADL)
ADLS_ACUITY_SCORE: 26
ADLS_ACUITY_SCORE: 27
ADLS_ACUITY_SCORE: 26
ADLS_ACUITY_SCORE: 26
ADLS_ACUITY_SCORE: 27
ADLS_ACUITY_SCORE: 27

## 2019-09-19 NOTE — PLAN OF CARE
/62 (BP Location: Right arm)   Pulse 82   Temp 98  F (36.7  C) (Oral)   Resp 18   Wt 87.8 kg (193 lb 9.6 oz)   SpO2 96%   BMI 35.41 kg/m      Patient VSS on RA, afebrile. A&O x4, lethargic intermittently. Denies pain. Tolerating 2g K diet with fair appetite, cycled TF from [id number]. Boost shake x1, patient did not eat breakfast or lunch. NJ bridle digging into patients underside of nose, WOC consulted, dressing applied. PIV-SL. Smear BM this AM. Voiding adequate UOP. Incision approximated. Up SBA and walker+GB, up in chair x2 today. PT/OT tried to see patient today, she was very lethargic and PT to come back to re-assess. Plan for TCU discharge tomorrow depending on availability. Continue to encourage nutrition and activity. Will continue with POC and notify MD with changes or concerns.

## 2019-09-19 NOTE — DISCHARGE SUMMARY
[facility]  I evaluated the patient today.  I reviewed the discharge plan with the fellow, and agree with the final assessment and plan for discharge as noted in the discharge summary.  I personally spent  30 minutes or less  today on discharge activities for this patient.      Mandeep Alford MD, PAM  Professor of Surgery  [employer]  Surgical Director of Liver Transplant Program  Executive Medical Director of Pediatric Transplantation  Discharge Summary  Transplant Surgery    Date of Admission:  2019  Date of Discharge:  19  Discharging Provider: Mandeep Alford M.D./Yvette Reich NP      Discharge Diagnoses   Principal Problem:    Status post liver transplantation (H)  Active Problems:    Acute kidney failure, unspecified (H)    Epistaxis    Anemia due to blood loss, acute    C. difficile diarrhea    Metabolic encephalopathy    GAMAL (acute kidney injury) (H)    Steroid-induced hyperglycemia    Immunosuppressed status (H)    Hyperkalemia    Severe malnutrition (H)      History of Present Illness   Nicci Pemberton is an 59 year old female with history of end stage liver disease secondary to ANGULO and heterozygous alpha-1 antitrypsin deficiency, HTN, and hypothyroidism. Admitted 19 with GAMAL and decompensated cirrhosis. Underwent  donor liver transplant on 19.    Hospital Course   Liver transplant: LFTs trended down as expected after transplant. Total bili uptrended again ~ with a peak of 1.7 on 9/15, and then normalized. Alk Phos remains slightly elevated but stable.     Immunosuppression:  Induction immunosuppression with basiliximab x2 doses and steroids.  Maintenance immunosupression with mycophenolate (Myfortic 720mg BID) and Cyclosporine 225 mg BID.  Tacrolimus was discontinued on  due to prolonged delirium. Myfortic dose lowered due to nausea. CSA  level goal 200-300 (12 hour trough).  Level   282. Infectious prophylaxis with dapsone (x6 months), valganciclovir (x12 weeks), Mycelex completed inpatient.      Transplant coordinator Zina [phone].  Biliary stent:  No stent on AXR 9/16/19  Donor status:  DBD  CMV D- / R +  EBV D + / R +    ARF: GAMAL present on admission. CRRT started intra-op, stopped 8/24. Renal function improved thereafter. Cr 0.9 on discharge with >1.3L UOP.    Epistaxis and oropharyngeal bleeding: Secondary to trauma from attempted NG placement in OR. ENT consulted for packing. Resolved prior to discharge.    Acute blood loss anemia, anemia of chronic disease: Anisha-op. She did require transfusions postoperatively. She was last transfused 1 unit 9/17 and her Hgb was stable at 8 at discharge.     Toxic and metabolic encephalopathy: Post-op. Secondary to poor sleep, drugs, acute illness. MRI head 9/3 unremarkable. Discontinued tacrolimus due to concern for neurotoxicity. Consulted Neurology: recommended LP to rule out infectious etiology and Video EEG. 9/5 LP negative for infection. EEG negative for seizure. This was slow to resolve, but she was alert and oriented at time of discharge.    C. Diff diarrhea: C diff positive 9/17, started PO Vanco with planned duration of 10 days.    Hyperkalemia: Post-transplant. Resolved with low K diet and Nepro TF formula. K 4.7 at discharge.    Severe malnutrition in the context of acute on chronic illness: Required supplemental TF post-op. Calorie counts remain low and requires EN via NJ. She will discharge on Nepro @ 65cc/hr x14 hrs. Discontinue tube feeds when oral intake is adequate.     Steroid induced hyperglycemia: With high dose steroids. Resolved prior to discharge.    Hypothyroidism: On Synthroid on admission, restarted at home dose. TSH elevated to 17.1, Free T4 0.70. Synthroid increased. Plan to follow up with endocrinology on discharge.     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 liver transplant  2. Adhesion lysis > 60 min  3. Complex reconstruction of the accessory right hepatic artery, anastamosis to GDA       Surgeon:  Surgeon(s) and Role:     * Mandeep Alford MD - Primary     * Renee Allen MD - Fellow - Assisting         Pending Results    These results will be followed up by the transplant coordinator  Unresulted Labs Ordered in the Past 30 Days of this Admission     Date and Time Order Name Status Description    9/11/2019 0614 T4 free In process     9/4/2019 1253 Fungus Culture, non-blood CSF Tube 2 Preliminary     8/23/2019 0544 Platelets prepare order unit In process     8/20/2019 0358 Cryoprecipitate prepare order unit In process         Code Status   Full    Primary Care Physician   Wale Thurston    Physical Exam   Temp: 98.6  F (37  C) Temp src: Oral BP: 109/68   Heart Rate: 71 Resp: 16 SpO2: 95 % O2 Device: None (Room air)    Vitals:    09/21/19 0439 09/22/19 0400 09/23/19 0056   Weight: 88 kg (194 lb 0.4 oz) 88.8 kg (195 lb 11.2 oz) 88.3 kg (194 lb 9.6 oz)     Vital Signs with Ranges  Temp:  [97.3  F (36.3  C)-98.6  F (37  C)] 97.7  F (36.5  C)  Pulse:  [85] 85  Heart Rate:  [71-90] 71  Resp:  [16-18] 18  BP: ()/(47-68) 112/64  SpO2:  [91 %-97 %] 92 %  I/O last 3 completed shifts:  In: 2700 [P.O.:1390; NG/GT:270]  Out: 1825 [Urine:1825]    PE:  General Appearance: NAD  HEENT: Feeding tube in place with EN infusing  Skin: pale, warm,dry scattered bruising  Heart: RRR  Lungs: NLB on RA  Abdomen: soft, nondistended, dull to percussion, incision well approximated c/d/i.   : voiding without retention  Extremities: edema: present bilaterally. 1+.  Neurologic: alert, oriented x3.    Time Spent on this Encounter   I, Yvette Reich NP personally saw the patient today and spent greater than 30 minutes discharging this patient.    Discharge  Disposition   Discharged to rehabilitation facility  Condition at discharge: Stable    Consultations This Hospital Stay   GI HEPATOLOGY ADULT IP CONSULT  INTERNAL MEDICINE PROCEDURE TEAM ADULT IP CONSULT [facility]  SPIRITUAL HEALTH SERVICES IP CONSULT  LYMPHEDEMA THERAPY IP CONSULT  VASCULAR ACCESS CARE ADULT IP CONSULT  PHARMACY TO DOSE VANCO  SOCIAL WORK IP CONSULT  NUTRITION SERVICES ADULT IP CONSULT  PHYSICAL THERAPY ADULT IP CONSULT  OCCUPATIONAL THERAPY ADULT IP CONSULT  PHARMACY CRRT IP CONSULT  NUTRITION SERVICES ADULT IP CONSULT  OCCUPATIONAL THERAPY ADULT IP CONSULT  PHYSICAL THERAPY ADULT IP CONSULT  PHARMACY IP CONSULT  SOT MEDICATION HISTORY IP PHARMACY CONSULT  PHARMACY TO DOSE VANCO  PHARMACY IP CONSULT  PHARMACY CRRT IP CONSULT  PHARMACY IP CONSULT  LYMPHEDEMA THERAPY IP CONSULT  SPEECH LANGUAGE PATH ADULT IP CONSULT  VASCULAR ACCESS CARE ADULT IP CONSULT  NEUROLOGY GENERAL ADULT IP CONSULT  INTERNAL MEDICINE PROCEDURE TEAM ADULT IP CONSULT [facility] LUMBAR PUNCTURE  INTERVENTIONAL RADIOLOGY ADULT/PEDS IP CONSULT  INTERVENTIONAL RADIOLOGY ADULT/PEDS IP CONSULT  PULMONARY GENERAL ADULT IP CONSULT  GI PANCREATICOBILIARY ADULT IP CONSULT  VASCULAR ACCESS CARE ADULT IP CONSULT  VASCULAR ACCESS CARE ADULT IP CONSULT  WOUND OSTOMY CONTINENCE NURSE  IP CONSULT  NUTRITION SERVICES ADULT IP CONSULT    Discharge Orders       General info for SNF    Length of Stay Estimate: Short Term Care: Estimated # of Days <30  Condition at Discharge: Improving  Level of care:skilled   Rehabilitation Potential: Good  Admission H&P remains valid and up-to-date: Yes  Recent Chemotherapy: N/A  Use Nursing Home Standing Orders: Yes     Reason for your hospital stay    Liver transplant     Intake and output    Every shift     Daily weights    Call Provider for weight gain of more than 2 pounds per day or 5 pounds per week.     Activity - Up with nursing assistance     Wound care    Instructions to care for your  abdominal wound at home:keep incision clean and dry. Ok to shower. Do not scrub incision.  Pat dry.  Do not soak or submerge in baths, hot tubs, or pools. Do not apply lotions or powders to incision.     Follow Up and recommended labs and tests    You will be seen in the [facility] (ph. [phone], option 7) the next Monday after discharge from [facility] and the [facility].   Your labs will be drawn at 9:00am just prior to your appointment. DO NOT take Cyclosporine until after your labs are drawn. Remember to bring all of your medications with you to this appointment.      LABS:  Obtain Hepatic panel on 9/24   Transplant labs (CBC, BMP, hepatic panel, mag, phos, and CSA levels (12 hours after administration)) to be drawn every Monday and Thursday for 4 weeks.    FOLLOW UP APPOINTMENTS:  Remember to always bring an updated medication list to all appointments.     Follow up with your transplant surgeon, Dr. Alford, in 2 weeks.  Follow up with transplant hepatology at 1 month.    Follow up with primary care provider in 2-4 weeks. (Pt to schedule)    Call scheduling at [phone] if you have not heard about your appointments within 48 hours after discharge.    WHEN TO CONTACT YOUR  COORDINATOR:  Transplant Coordinator [phone]  Notify your coordinator if you have abdominal pain, increased redness or drainage from your incision, or fever greater than 100.5F.  Notify your coordinator immediately if you are ever unable to take your immunosuppressive medications for any reason.  If it is outside of office hours, please call the hospital switchboard at [phone] and ask to have the liver transplant surgery fellow paged for urgent medical questions, or present to the emergency department.    OTHER DISCHARGE INSTRUCTIONS:  Walk at least four times a day, lift no greater than 10 pounds for 6-8 weeks from the time of surgery.  No driving while taking narcotics or 3 weeks after surgery.    Diet recommendations  post-transplant:   3 Gram K Diet +Nepro enteral nutrition @65cc/hr x14 hours, via NJ tube  Heart healthy dietary habits long term (low saturated/trans fat, low sodium). High protein diet x 8 weeks. Practice food safety precautions. Orders Placed This Encounter     Nutrition Services Adult IP Consult    Reason:  Order tube feeds and monitor calorie counts and nutrition needs.     Contact Isolation     Oxygen - Nasal cannula    0-2 Lpm by nasal cannula to keep O2 sats 90-96%.     Pneumatic Compression Device     Bilateral calf. Remove 30 mins BID.     Advance Diet as Tolerated    Follow this diet upon discharge:       -Adult Formula Drip Feeding: Specified Time Nepro; Nasoduodenal tube; Goal Rate: 65; mL/hr; From: 6:00 PM; 8:00 AM; Medication - Feeding Tube Flush Frequency: At least 15-30 mL water before and after medication administration and with tube clogging      -Snacks/Supplements Adult: Nepro Oral Supplement; Between Meals      -3 Gram K Diet     Discharge Medications    Discharge Medication List as of 9/23/2019  2:33 PM      START taking these medications    Details   aspirin (ASA) 325 MG tablet Take 1 tablet (325 mg) by mouth daily, Transitional      dapsone (ACZONE) 100 MG tablet Take 1 tablet (100 mg) by mouth daily, Transitional      GENGRAF (BRAND) 100 MG/ML SOLUTION Take 2.25 mLs (225 mg) by mouth 2 times daily, Transitional      heparin sodium 5000 UNIT/0.5ML injection Inject 0.5 mLs (5,000 Units) Subcutaneous every 8 hours, Transitional      melatonin 3 MG tablet Take 1 tablet (3 mg) by mouth At Bedtime, Transitional      menthol, Topical Analgesic, 2.5% (BENGAY VANISHIN SCENT) 2.5 % GEL topical gel Apply topically every 6 hours as needed for moderate painTransitional      multivitamins w/minerals (CERTAVITE) liquid 15 mLs by Per Feeding Tube route daily, Transitional      MYFORTIC (BRAND) 360 MG EC tablet Take 2 tablets (720 mg) by mouth 2 times daily, Transitional      !! ondansetron (ZOFRAN-ODT) 4  MG ODT tab Take 1 tablet (4 mg) by mouth every 6 hours as needed for nausea or vomiting, Transitional      !! ondansetron (ZOFRAN-ODT) 4 MG ODT tab Take 1 tablet (4 mg) by mouth every morning (before breakfast), Transitional      polyethylene glycol (MIRALAX/GLYCOLAX) packet Take 17 g by mouth daily as needed for constipation, Transitional      protein modular (PROSOURCE TF) LIQD 1 packet by Per Feeding Tube route daily, Transitional      senna-docusate (SENOKOT-S/PERICOLACE) 8.6-50 MG tablet Take 2 tablets by mouth 2 times daily as needed for constipation, Transitional      sodium chloride (OCEAN) 0.65 % nasal spray Spray 2 sprays into both nostrils 2 times daily as needed for congestion, Transitional      valGANciclovir (VALCYTE) 450 MG tablet Take 1 tablet (450 mg) by mouth daily, Transitional      vancomycin (FIRVANQ) 50 MG/ML oral solution Take 2.5 mLs (125 mg) by mouth 4 times daily, Transitional      vitamin C 500 MG TABS Take 1 tablet (500 mg) by mouth daily, Transitional      zinc sulfate (ZINCATE) 220 (50 Zn) MG capsule Take 1 capsule (220 mg) by mouth daily, Transitional       !! - Potential duplicate medications found. Please discuss with provider.      CONTINUE these medications which have CHANGED    Details   famotidine (PEPCID) 20 MG tablet Take 1 tablet (20 mg) by mouth daily, Transitional      levothyroxine (SYNTHROID/LEVOTHROID) 175 MCG tablet Take 1 tablet (175 mcg) by mouth daily, Transitional         CONTINUE these medications which have NOT CHANGED    Details   Cholecalciferol (VITAMIN D-3) 1000 units CAPS Take 1,000 Units by mouth 2 times daily, Historical         STOP taking these medications       bumetanide (BUMEX) 1 MG tablet Comments:   Reason for Stopping:         ciprofloxacin (CIPRO) 500 MG tablet Comments:   Reason for Stopping:         lactulose 20 GM/30ML SOLN Comments:   Reason for Stopping:         magnesium oxide (MAG-OX) 400 MG tablet Comments:   Reason for Stopping:          MYFORTIC (BRAND) 180 MG EC tablet Comments:   Reason for Stopping:         oxyCODONE HCl (OXAYDO) 5 MG TABA Comments:   Reason for Stopping:         rifaximin (XIFAXAN) 550 MG TABS tablet Comments:   Reason for Stopping:         spironolactone (ALDACTONE) 100 MG tablet Comments:   Reason for Stopping:             Allergies   Allergies   Allergen Reactions     Food      Fruits with cores and pits  Apples     Sulfa Drugs Unknown     Swollen joints     Furosemide Rash

## 2019-09-19 NOTE — PROGRESS NOTES
CLINICAL NUTRITION SERVICES - BRIEF NOTE   (See RD note on 9/18 for full assessment)     Reason for RD note: Following up on PO and potential bridle readjustment d/t appearing too taut on RD visit yesterday.     New Findings:  Nutrition support: Switching to cycled feeds today: Nepro @ 65 ml/hr x 16 hours + 1 pkt Prosource TF daily  - 1912 kcal (32 kcal/kg), 95 grams protein (1.6g/kg) daily    Diet/Oral Intake: 2 g K+ diet + Breeze BID + kcal counts 9/19-9/21. Pt reports stomach pain/discomfort with eating, but has better appetite this AM. Had breakfast of cheerios, 4 oz milk, fruit on table at time of RD visit.    GI: Pt thinks she is tolerating TF. PO causes some stomach pain/discomfort.    Labs: Na 134 (WNL), K 4.5 (WNL), Phos 4.4 (WNL), Mg 1.7 (WNL), BUN 32 (H), Cr 0.75 (WNL)     Meds: Pepcid, levothyroxine, certavite, vitamin D3 1,000 international unit(s) daily    Skin: Skin beneath nasal bridle was inspected. Registered dietitian who places bridles noted significant bridle string tension r/t feeding tube twisted and string being too tight. RD readjusted and discussed with pt RN to keep tube straight and avoid Tegaderm securement (deviating tube to R side pulls string back into pressure injury). WOC RN to be consulted.    Interventions:  -RD loosened and untwisted bridle string to offload pressure from nasal pressure injury on columella. Deepest injury noted on L side and underneath middle columella (string essentially buried in tissue). Pt tolerated readjustment well. Discussed with Liver transplant NP to order WOC RN consult d/t severity of pressure injury - causing pain to pt, but improved per pt after RD readjusted bridle.    -Supplements: Switched Boost Breeze to Nepro Chan BID per pt preference. Encouraged pt to drink both - previous provider told her and family that if she takes 1000 kcals today, may remove FT.     Future/Additional Recommendations:  -Monitor skin integrity. Need for Thera-vit-M vs  Certavite?  -Monitor kcal counts. NP would like pt to meet 80% assessed needs (1440 kcal, 72 g protein) prior to FT removal.     Nutrition will continue to follow per protocol.    Criss Stevenson RD, LD  Pager: [phone]

## 2019-09-19 NOTE — PROGRESS NOTES
Transplant Surgery  Inpatient Daily Progress Note  09/19/2019    Assessment & Plan: Nicci Pemberton is a 60 yo female with a past medical history significant for end stage liver disease 2/2 ANGULO and alpha 1 anti-trypsin deficiency now s/p DD OLT on 8/18/19.     Graft function: DD OLT 8/18/19 without stent-POD #31  Transaminases, AP stable. Tbili had been uptrending, peaked at 1.7 but improved to 1.3 today.     Immunosuppression management:   Tac - stopped due to prolonged delirium  CSA started 9/2.  mg BID to titrate to a goal of 200-300. Level 9/18 206 (12 hour trough).  MMF increased to 1000 mg BID during transition of CNI. Due to nausea, changed to Myfortic 540 mg BID.   Pred 5mg when transitioning IS meds, now discontinued.   Complexity of management: Medium. Contributing factors: anemia and inductionencephalopathy.     Hematology: Acute blood loss anemia. Transfusion goal hgb > 7. Hgb 7.4 today s/p transfusion yesterday. Last transfused 3 units 8/20, 1 unit 9/8 and 1 unit 9/17.     HEENT: Epistaxis: due to friable mucosa, managed with packing. NJ tube with wound ulceration to Left nare.  Consult WOC.    Cardiorespiratory: Patient extubated 8/24. RA. Encourage IS and ambulation.     Neuro:   Toxic and metabolic encephalopathy: likely secondary to poor sleep, drugs (tac, pain medication), acute illness. Ordered melatonin to help patient sleep, patient's mental status has seemed to be improving overall.  -MRI head 9/3- results unremarkable. Discontinued Tacrolimus. Consulted neurology, recommended LP to rule out infectious etiology and Video EEG monitoring completed, no evidence of seizures.    9/5 LP, results thus far negative for infection. WBC 1. Protein 44 and glucose 51. Negative CSF studies: cryptococcal, enterovirus, HSV, VZV.    -VBG reviewed. Avoid medications that cause respiratory depression.   -Continue scheduled melatonin at bedtime.   -work on day/night cycles  -discontinued sitter 9/17 AM as  patient is A&O, demonstrated nursing call button and is not pulling at FT     GI/Nutrition: 2gm K diet, NJ in place. Continue EN- Nepro.   Diarrhea- C diff positive 9/17, started PO Vanco x 10 days    Endocrine:   Steroid induced hyperglycemia- resolved    Fluid/Electrolytes:   GAMAL- CRRT stopped and patient has required no further HD. Incontinent of urine, so difficult to quantify, but Cr now normalized. SCr 0.8  Metabolic alkalosis, with compensated respiratory acidosis: CO2  30's. Nephrology following, avoiding loop diuretics  Hyperkalemia: K 4.5-improved after transitioning to Nepro EN.    : Incontinent    Infectious disease: AF since with PO Vanco initiation   WBC WNL  UA/: moderate LE, 3 epis. UC with mixed edith.   Ppx with micafungin x 10 days completed, zosyn completed.   PPx with dapsone and valcyte.    Prophylaxis: Heparin subcutaneous TID.     Activity: OOB to chair today. PT/OT.     Disposition: 7A, ready to discharge to ARU on 9/17, will likely have a bed available on Friday     Medical Decision Making: Medium  Subsequent visit 53715 (moderate level decision making)    VEENA/Fellow/Resident Provider: Yvette Reich NP      Faculty: Hui Cross MD  ____________________________________________________________  Transplant History: Admitted 8/16/2019 for acute decompensated ANGULO.   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    8/18/2019 (Liver), Postoperative day: 32     Interval History:   No complaints. Oriented. C/o abdominal cramping, diarrhea improving. NJ bridle noted to be embedded in nare.     ROS:   A 10-point review of systems was negative except as noted above.    Curent Meds:    aspirin  325 mg Oral Daily     clotrimazole  1 Molly Buccal 4x Daily     cycloSPORINE modified  225 mg Oral BID IS     dapsone  100 mg Oral Daily     famotidine  20 mg Oral Daily     heparin  5,000 Units Subcutaneous Q8H     levothyroxine  175 mcg Oral Daily     magnesium  oxide  400 mg Oral Daily     melatonin  3 mg Oral At Bedtime     multivitamins w/minerals  15 mL Per Feeding Tube Daily     mycophenolic acid  540 mg Oral BID IS     protein modular  1 packet Per Feeding Tube Daily     sodium chloride  1 spray Both Nostrils Q4H     sodium chloride (PF)  3 mL Intravenous Q8H     sodium chloride (PF)  3 mL Intracatheter Q8H     valGANciclovir  450 mg Oral Daily     vancomycin  125 mg Oral 4x Daily     vitamin D3  1,000 Units Oral BID       Physical Exam:     Admit Weight: 104.3 kg (230 lb)    Current Vitals:   /62 (BP Location: Right arm)   Pulse 82   Temp 98  F (36.7  C) (Oral)   Resp 18   Wt 87.8 kg (193 lb 9.6 oz)   SpO2 96%   BMI 35.41 kg/m      Vital sign ranges:    Temp:  [97.5  F (36.4  C)-99.1  F (37.3  C)] 98  F (36.7  C)  Pulse:  [82] 82  Heart Rate:  [83-88] 83  Resp:  [16-18] 18  BP: (103-118)/(62-81) 107/62  SpO2:  [88 %-98 %] 96 %  Patient Vitals for the past 24 hrs:   BP Temp Temp src Pulse Heart Rate Resp SpO2 Weight   09/19/19 0745 107/62 98  F (36.7  C) Oral 82 -- 18 96 % --   09/19/19 0722 -- -- -- -- -- -- 98 % --   09/19/19 0720 -- -- -- -- -- -- (!) 88 % --   09/19/19 0338 103/69 98  F (36.7  C) Oral -- 83 18 93 % --   09/19/19 0300 -- -- -- -- -- -- -- 87.8 kg (193 lb 9.6 oz)   09/18/19 2324 118/75 99.1  F (37.3  C) Oral -- 84 16 90 % --   09/18/19 1941 111/65 98.1  F (36.7  C) Oral -- 88 16 91 % --   09/18/19 1550 110/67 97.7  F (36.5  C) Oral -- 83 16 94 % --   09/18/19 1215 107/66 98.2  F (36.8  C) Oral -- 86 16 96 % --   09/18/19 0837 118/81 97.5  F (36.4  C) Oral -- 85 18 93 % --     General Appearance: NAD  HEENT: Feeding tube in place with EN infusing  Skin: normal, warm, scattered bruising  Heart: RRR  Lungs: NLB on RA  Abdomen: soft, nondistended,  incision sutured, c/d/i.   : Wearing depends, incontinent at times  Extremities: edema: present bilaterally. 1+.  Neurologic: alert, oriented x2. Tremor to LUE.     Data:   CMP  Recent Labs    Lab 09/19/19 0628 09/18/19 0614    134   POTASSIUM 4.5 4.3   CHLORIDE 99 98   CO2 31 30   GLC 93 94   BUN 32* 31*   CR 0.75 0.78   GFRESTIMATED 87 83   GFRESTBLACK >90 >90   JACOB 9.2 8.7   MAG 1.7 1.8   PHOS 4.4 3.8   ALBUMIN 2.4* 2.2*   BILITOTAL 1.3 1.2   ALKPHOS 222* 185*   AST 39 40   ALT 45 43     CBC  Recent Labs   Lab 09/19/19 0628 09/18/19 0614   HGB 8.6* 7.4*   WBC 5.3 5.2    157     COAGS  No lab results found in last 7 days.    Invalid input(s): XA

## 2019-09-19 NOTE — PROGRESS NOTES
[facility] Nurse Inpatient Pressure Injury Assessment   Reason for consultation: Evaluate and treat nose      ASSESSMENT  Pressure Injury: on Left nare extending to columella, hospital acquired,   This is a Medical Device Related Pressure Injury (MDRPI) due to Bridle string  Pressure Injury is Stage 2   Contributing factor of the pressure injury: pressure  Status: initial assessment  Recommend provider order: NA     TREATMENT PLAN  Bilateral nares and across septum wound: Every shift check to ensure dressing in place and that tube is NOT taped to face. Daily: change strip of comfeel with strips extending into bilateral nares across septum.   Orders Written  WO Nurse follow-up plan:twice weekly  Nursing to notify the Provider(s) and re-consult the WO Nurse if wound(s) deteriorates or new skin concern.    Patient History  According to provider note(s):  Nicci Pemberton is a 60 yo female with a past medical history significant for end stage liver disease 2/2 ANGULO and alpha 1 anti-trypsin deficiency now s/p DD OLT on 8/18/19.     Objective Data  Containment of urine/stool: Continent of bowel and Continent of bladder    Current Diet/ Nutrition:  Orders Placed This Encounter      2 Gram K Diet      Output:   I/O last 3 completed shifts:  In: 1320 [NG/GT:240]  Out: 602 [Urine:350; Other:251; Stool:1]    Risk Assessment:   Sensory Perception: 3-->slightly limited  Moisture: 3-->occasionally moist  Activity: 3-->walks occasionally  Mobility: 3-->slightly limited  Nutrition: 3-->adequate  Friction and Shear: 3-->no apparent problem  Umair Score: 18      Labs:   Recent Labs   Lab 09/19/19  0628   ALBUMIN 2.4*   HGB 8.6*   WBC 5.3       Physical Exam  Skin inspection: focused nose    Wound Location:  Columella and left nare    Left nostril      Columella    Date of last Photo 9/19/19  Wound History: Per Dietitian Criss Iglesias wound found this morning with bridle tight, twisted, imbedded in left nare so it was not visible, and tegaderm  taped to right cheek. Dietitian then removed tape, untwisted bridle, remove from nasal mucosa, and loosen bridle. Per RN Aida Guevara patient has had four different NJs placed since 9/6, and goal is for patient to eat enough to be able to remove, however patient very tired today. Patient was unable to report how long nose has been hurting. Writer had cally DE CWOCN also assess wound.  Measurements (length x width x depth, in cm) 0.3 cm x 1.1 cm  x  0.1 cm   Wound Base:  10% pink 30% white 60% mucosal tissue depth  Tunneling N/A  Undermining N/A  Palpation of the wound bed: normal   Periwound skin: intact  Color: normal and consistent with surrounding tissue  Temperature: normal   Drainage:, small  Description of drainage: serosanguinous  Odor: none  Pain: during dressing change, sharp    Interventions  Current support surface: Standard  Low air loss mattress - nurse assessing if can be changed to atmos air  Current off-loading measures: Pillows under calves  Repositioning aid: Pillows  Visual inspection of wound(s) completed   Tube Securement: bridle  Wound Care: was done per plan of care.  Supplies: gathered and placed at the bedside  Educated provided: plan of care and wound progress  Education provided to: patient , nurse and dietitian  Discussed importance of:off-loading pressure to wound  Discussed plan of care with Patient, RN, dietitian, and nurse manager     Shari Lopez RN, CWOCN

## 2019-09-19 NOTE — PLAN OF CARE
/69 (BP Location: Right arm)   Pulse 94   Temp 98  F (36.7  C) (Oral)   Resp 18   Wt 87.8 kg (193 lb 9.6 oz)   SpO2 93%   BMI 35.41 kg/m      Tmax: 99.1 oral, OVSS, on RA.  Pt. disoriented to time & calling out appropriately. Bed alarm on for safety.  Pt. c/o incisional pain & managed with ice pack. Tube feeds at 45cc/hour. Order to stop tube feeds at 10am & start cycle at 6pm-10am. Pt. up to commode with SBA. 1 loose stool mixed with urine x1. Right PIV saline locked.  Plan to discharge to  ARU tomorrow. Continue to follow POC & update provider with changes/concerns.

## 2019-09-19 NOTE — PLAN OF CARE
/65 (BP Location: Right arm)   Pulse 94   Temp 98.1  F (36.7  C) (Oral)   Resp 16   Wt 87.8 kg (193 lb 9.6 oz)   SpO2 91%   BMI 35.41 kg/m      0433-7853: Patient VSS on RA when awake (1L for sleep), afebrile. Complains of abdominal cramping, but improves with repositioning/movement; no c/o nausea or SOB. Tolerating 2g K diet with moderate appetite. TF via NJ @ 45ml/h, to be stopped @ 10am tomorrow. Cycled TF (6pm-10am) starting tomorrow @ 65ml/h. Voiding adeqautely; 1 loose BM this shift. Incision approximated; OBED site stitched. Up with assist x1, up in chair this evening. Lab book up to date. Plans for discharge to ARU 9/20.   Will continue with POC and notify MD with changes or concerns.

## 2019-09-19 NOTE — PLAN OF CARE
PT 7A:  Pt sleeping soundly at time of attempt.  Difficult to rouse.  Per RN had requests to nap 2/2 fatigue.  Therapist unable to return. Rescheduled per POC

## 2019-09-20 ENCOUNTER — APPOINTMENT (OUTPATIENT)
Dept: OCCUPATIONAL THERAPY | Facility: CLINIC | Age: 59
DRG: 005 | End: 2019-09-20
Payer: COMMERCIAL

## 2019-09-20 ENCOUNTER — APPOINTMENT (OUTPATIENT)
Dept: PHYSICAL THERAPY | Facility: CLINIC | Age: 59
DRG: 005 | End: 2019-09-20
Payer: COMMERCIAL

## 2019-09-20 PROBLEM — E43 SEVERE MALNUTRITION (H): Status: ACTIVE | Noted: 2019-09-20

## 2019-09-20 LAB
ALBUMIN SERPL-MCNC: 2.2 G/DL (ref 3.4–5)
ALP SERPL-CCNC: 204 U/L (ref 40–150)
ALT SERPL W P-5'-P-CCNC: 41 U/L (ref 0–50)
ANION GAP SERPL CALCULATED.3IONS-SCNC: 8 MMOL/L (ref 3–14)
AST SERPL W P-5'-P-CCNC: 38 U/L (ref 0–45)
BASOPHILS # BLD AUTO: 0 10E9/L (ref 0–0.2)
BASOPHILS NFR BLD AUTO: 0.4 %
BILIRUB DIRECT SERPL-MCNC: 0.7 MG/DL (ref 0–0.2)
BILIRUB SERPL-MCNC: 1.1 MG/DL (ref 0.2–1.3)
BUN SERPL-MCNC: 33 MG/DL (ref 7–30)
CALCIUM SERPL-MCNC: 8.8 MG/DL (ref 8.5–10.1)
CHLORIDE SERPL-SCNC: 99 MMOL/L (ref 94–109)
CO2 SERPL-SCNC: 28 MMOL/L (ref 20–32)
CREAT SERPL-MCNC: 0.81 MG/DL (ref 0.52–1.04)
CYCLOSPORINE BLD LC/MS/MS-MCNC: 284 UG/L (ref 50–400)
DIFFERENTIAL METHOD BLD: ABNORMAL
EOSINOPHIL # BLD AUTO: 0.8 10E9/L (ref 0–0.7)
EOSINOPHIL NFR BLD AUTO: 17.3 %
ERYTHROCYTE [DISTWIDTH] IN BLOOD BY AUTOMATED COUNT: 18.4 % (ref 10–15)
GFR SERPL CREATININE-BSD FRML MDRD: 79 ML/MIN/{1.73_M2}
GLUCOSE SERPL-MCNC: 95 MG/DL (ref 70–99)
HCT VFR BLD AUTO: 26.3 % (ref 35–47)
HGB BLD-MCNC: 7.9 G/DL (ref 11.7–15.7)
IMM GRANULOCYTES # BLD: 0.2 10E9/L (ref 0–0.4)
IMM GRANULOCYTES NFR BLD: 4.5 %
LYMPHOCYTES # BLD AUTO: 0.3 10E9/L (ref 0.8–5.3)
LYMPHOCYTES NFR BLD AUTO: 5.8 %
MAGNESIUM SERPL-MCNC: 1.7 MG/DL (ref 1.6–2.3)
MCH RBC QN AUTO: 30.6 PG (ref 26.5–33)
MCHC RBC AUTO-ENTMCNC: 30 G/DL (ref 31.5–36.5)
MCV RBC AUTO: 102 FL (ref 78–100)
MONOCYTES # BLD AUTO: 0.7 10E9/L (ref 0–1.3)
MONOCYTES NFR BLD AUTO: 13.9 %
NEUTROPHILS # BLD AUTO: 2.7 10E9/L (ref 1.6–8.3)
NEUTROPHILS NFR BLD AUTO: 58.1 %
NRBC # BLD AUTO: 0 10*3/UL
NRBC BLD AUTO-RTO: 0 /100
PHOSPHATE SERPL-MCNC: 4.4 MG/DL (ref 2.5–4.5)
PLATELET # BLD AUTO: 192 10E9/L (ref 150–450)
POTASSIUM SERPL-SCNC: 4.6 MMOL/L (ref 3.4–5.3)
PROT SERPL-MCNC: 5.5 G/DL (ref 6.8–8.8)
RBC # BLD AUTO: 2.58 10E12/L (ref 3.8–5.2)
SODIUM SERPL-SCNC: 134 MMOL/L (ref 133–144)
TME LAST DOSE: NORMAL H
WBC # BLD AUTO: 4.7 10E9/L (ref 4–11)

## 2019-09-20 PROCEDURE — 25000128 H RX IP 250 OP 636: Performed by: PHYSICIAN ASSISTANT

## 2019-09-20 PROCEDURE — 12000026 ZZH R&B TRANSPLANT

## 2019-09-20 PROCEDURE — 83735 ASSAY OF MAGNESIUM: CPT | Performed by: TRANSPLANT SURGERY

## 2019-09-20 PROCEDURE — 25000132 ZZH RX MED GY IP 250 OP 250 PS 637: Performed by: PHYSICIAN ASSISTANT

## 2019-09-20 PROCEDURE — 36415 COLL VENOUS BLD VENIPUNCTURE: CPT | Performed by: TRANSPLANT SURGERY

## 2019-09-20 PROCEDURE — 25000132 ZZH RX MED GY IP 250 OP 250 PS 637: Performed by: TRANSPLANT SURGERY

## 2019-09-20 PROCEDURE — 85025 COMPLETE CBC W/AUTO DIFF WBC: CPT | Performed by: TRANSPLANT SURGERY

## 2019-09-20 PROCEDURE — 27210432 ZZH NUTRITION PRODUCT RENAL BASIC LITER

## 2019-09-20 PROCEDURE — 97116 GAIT TRAINING THERAPY: CPT | Mod: GP

## 2019-09-20 PROCEDURE — 97140 MANUAL THERAPY 1/> REGIONS: CPT | Mod: GO | Performed by: OCCUPATIONAL THERAPIST

## 2019-09-20 PROCEDURE — 25000132 ZZH RX MED GY IP 250 OP 250 PS 637: Performed by: NURSE PRACTITIONER

## 2019-09-20 PROCEDURE — 80158 DRUG ASSAY CYCLOSPORINE: CPT | Performed by: PHYSICIAN ASSISTANT

## 2019-09-20 PROCEDURE — 80053 COMPREHEN METABOLIC PANEL: CPT | Performed by: TRANSPLANT SURGERY

## 2019-09-20 PROCEDURE — 82248 BILIRUBIN DIRECT: CPT | Performed by: TRANSPLANT SURGERY

## 2019-09-20 PROCEDURE — 25000131 ZZH RX MED GY IP 250 OP 636 PS 637: Performed by: PHYSICIAN ASSISTANT

## 2019-09-20 PROCEDURE — 84100 ASSAY OF PHOSPHORUS: CPT | Performed by: TRANSPLANT SURGERY

## 2019-09-20 PROCEDURE — 97530 THERAPEUTIC ACTIVITIES: CPT | Mod: GO

## 2019-09-20 PROCEDURE — 25000132 ZZH RX MED GY IP 250 OP 250 PS 637: Performed by: SURGERY

## 2019-09-20 PROCEDURE — 25000131 ZZH RX MED GY IP 250 OP 636 PS 637: Performed by: NURSE PRACTITIONER

## 2019-09-20 PROCEDURE — 97530 THERAPEUTIC ACTIVITIES: CPT | Mod: GP

## 2019-09-20 PROCEDURE — 25000132 ZZH RX MED GY IP 250 OP 250 PS 637

## 2019-09-20 RX ORDER — ACETAMINOPHEN 325 MG/1
650 TABLET ORAL EVERY 8 HOURS PRN
Status: DISCONTINUED | OUTPATIENT
Start: 2019-09-20 | End: 2019-09-23 | Stop reason: HOSPADM

## 2019-09-20 RX ORDER — ASCORBIC ACID 500 MG
500 TABLET ORAL DAILY
Status: DISCONTINUED | OUTPATIENT
Start: 2019-09-20 | End: 2019-09-23 | Stop reason: HOSPADM

## 2019-09-20 RX ORDER — ZINC SULFATE 50(220)MG
220 CAPSULE ORAL DAILY
Status: DISCONTINUED | OUTPATIENT
Start: 2019-09-20 | End: 2019-09-23 | Stop reason: HOSPADM

## 2019-09-20 RX ADMIN — SALINE NASAL SPRAY 1 SPRAY: 1.5 SOLUTION NASAL at 20:20

## 2019-09-20 RX ADMIN — ASPIRIN 325 MG ORAL TABLET 325 MG: 325 PILL ORAL at 08:44

## 2019-09-20 RX ADMIN — DAPSONE 100 MG: 25 TABLET ORAL at 08:44

## 2019-09-20 RX ADMIN — Medication 5000 UNITS: at 04:14

## 2019-09-20 RX ADMIN — VANCOMYCIN HYDROCHLORIDE 125 MG: KIT at 13:10

## 2019-09-20 RX ADMIN — OXYCODONE HYDROCHLORIDE AND ACETAMINOPHEN 500 MG: 500 TABLET ORAL at 15:58

## 2019-09-20 RX ADMIN — MAGNESIUM OXIDE TAB 400 MG (241.3 MG ELEMENTAL MG) 400 MG: 400 (241.3 MG) TAB at 11:41

## 2019-09-20 RX ADMIN — VANCOMYCIN HYDROCHLORIDE 125 MG: KIT at 20:19

## 2019-09-20 RX ADMIN — SALINE NASAL SPRAY 1 SPRAY: 1.5 SOLUTION NASAL at 08:58

## 2019-09-20 RX ADMIN — FAMOTIDINE 20 MG: 20 TABLET ORAL at 08:44

## 2019-09-20 RX ADMIN — Medication 5000 UNITS: at 20:19

## 2019-09-20 RX ADMIN — SALINE NASAL SPRAY 1 SPRAY: 1.5 SOLUTION NASAL at 11:43

## 2019-09-20 RX ADMIN — SALINE NASAL SPRAY 1 SPRAY: 1.5 SOLUTION NASAL at 04:31

## 2019-09-20 RX ADMIN — CLOTRIMAZOLE 1 TROCHE: 10 LOZENGE ORAL at 20:19

## 2019-09-20 RX ADMIN — MELATONIN TAB 3 MG 3 MG: 3 TAB at 20:19

## 2019-09-20 RX ADMIN — CLOTRIMAZOLE 1 TROCHE: 10 LOZENGE ORAL at 15:57

## 2019-09-20 RX ADMIN — MULTIVITAMIN 15 ML: LIQUID ORAL at 08:43

## 2019-09-20 RX ADMIN — CYCLOSPORINE 225 MG: 100 SOLUTION ORAL at 18:04

## 2019-09-20 RX ADMIN — VALGANCICLOVIR 450 MG: 450 TABLET, FILM COATED ORAL at 08:44

## 2019-09-20 RX ADMIN — SALINE NASAL SPRAY 1 SPRAY: 1.5 SOLUTION NASAL at 01:09

## 2019-09-20 RX ADMIN — ZINC SULFATE CAP 220 MG (50 MG ELEMENTAL ZN) 220 MG: 220 (50 ZN) CAP at 15:58

## 2019-09-20 RX ADMIN — VANCOMYCIN HYDROCHLORIDE 125 MG: KIT at 17:17

## 2019-09-20 RX ADMIN — LEVOTHYROXINE SODIUM 175 MCG: 175 TABLET ORAL at 08:44

## 2019-09-20 RX ADMIN — Medication 5000 UNITS: at 11:41

## 2019-09-20 RX ADMIN — MYCOPHENOLIC ACID 540 MG: 360 TABLET, DELAYED RELEASE ORAL at 08:43

## 2019-09-20 RX ADMIN — CLOTRIMAZOLE 1 TROCHE: 10 LOZENGE ORAL at 08:44

## 2019-09-20 RX ADMIN — Medication 1 PACKET: at 08:57

## 2019-09-20 RX ADMIN — VANCOMYCIN HYDROCHLORIDE 125 MG: KIT at 08:44

## 2019-09-20 RX ADMIN — MELATONIN 1000 UNITS: at 20:19

## 2019-09-20 RX ADMIN — MYCOPHENOLIC ACID 540 MG: 360 TABLET, DELAYED RELEASE ORAL at 18:04

## 2019-09-20 RX ADMIN — CLOTRIMAZOLE 1 TROCHE: 10 LOZENGE ORAL at 11:41

## 2019-09-20 RX ADMIN — CYCLOSPORINE 225 MG: 100 SOLUTION ORAL at 08:44

## 2019-09-20 RX ADMIN — SALINE NASAL SPRAY 1 SPRAY: 1.5 SOLUTION NASAL at 16:02

## 2019-09-20 RX ADMIN — MELATONIN 1000 UNITS: at 08:44

## 2019-09-20 RX ADMIN — ACETAMINOPHEN 650 MG: 325 TABLET, FILM COATED ORAL at 11:41

## 2019-09-20 ASSESSMENT — ACTIVITIES OF DAILY LIVING (ADL)
ADLS_ACUITY_SCORE: 23
ADLS_ACUITY_SCORE: 22
ADLS_ACUITY_SCORE: 23
ADLS_ACUITY_SCORE: 23

## 2019-09-20 NOTE — PLAN OF CARE
Discharge Planner PT   Patient plan for discharge: rehab  Current status: Pt completes sit <> stand Rafa with FWW. Negotiates bathroom with IV pole support, Rafa and cues for safety. Ambulates 50ft + 90ft with FWW, CGA, and wc follow. Prolonged seated rest break required between bouts. Pt report nausea prior to and throughout mobility. Also reports tightness in L lymphedema wrap, lymphedema therapist notified.   Barriers to return to prior living situation: Medical status, decreased activity tolerance, pain, decreased strength, and safety with mobility.   Recommendations for discharge: ARU  Rationale for recommendations: Pt demonstrates significant deficits from baseline in activity tolerance, safety with mobility, strength and balance. Pt would benefit from intensive interdisciplinary therapy to improve these deficits and return to PLOF. Pt is able to tolerate 3 hours of therapy per day.         Entered by: Shari Parra 09/20/2019 10:56 AM

## 2019-09-20 NOTE — PROGRESS NOTES
Calorie Count    Intake recorded for: 9/19/19  Total Kcals: 563 Total Protein: 20g    Kcals from Hospital Food: 563   Protein: 20g    Kcals from Outside Food (average):0 Protein: 0g    # Meals Recorded: 2 meals ordered, 1 recorded (50% side garden salad w/ Polish dressing & no tomato)    # Supplements Recorded: 100% 1 Nepro berry

## 2019-09-20 NOTE — PROGRESS NOTES
CLINICAL NUTRITION SERVICES - DISCHARGE NOTE    Patient s discharge needs assessed and discharge planning has been conducted with the multidisciplinary transplant care team including physicians, pharmacy, social work and transplant coordinator.    WOC nurse findings: (9/19) stage 2 pressure injury left nare    Interventions  1. Discussed wound care findings with team. Team okayed this write to order: zinc sulfate 220 mg and Vit C 500 mg x 10 days   2. Left post transplant nutrition handouts: Your diet after Transplant and food safety at bedside. Patient was soundly sleeping.     Follow up/Monitoring:  Once discharged, place outpatient nutrition consult via the transplant team if nutrition concerns arise.    Jenifer Otero, MS/RD/SABRINA/CNSC  7A RD Pager: [phone]

## 2019-09-20 NOTE — PLAN OF CARE
"OT/7A: Discharge Planner OT   Patient plan for discharge: Not discussed.  Current status: Pt very limited by fatigue and \"feeling off today\" as had a rough night sleeping. Required Mod A to take medications from RN as to not displace. Tolerates sitting up without backrest from recliner to eat cereal IND. Demonstrates ability to multitask while correctly answers 6/8 sequencing before/after questions while eating. VSS on RA.  Barriers to return to prior living situation: activity tolerance, pain, weakness, cognition, abdominal precautions  Recommendations for discharge: ARU  Rationale for recommendations: Pt presents below baseline and would benefit from further skilled interdisciplinary therapy to address activity tolerance, weakness, and cognition to improve ADL/IADL function. As pt progresses pt will tolerate 3hrs/day therapy, is motivated, and has strong social support.       Entered by: Jasmyne River 09/20/2019 3:39 PM       "

## 2019-09-20 NOTE — PLAN OF CARE
OT/Edema: Pt with 1-2+ pitting edema in feet, ankles and legs. Skin integrity intact. After skin cares GCBs placed from MTPs to knee creases. Please remove garment if it gets soiled or if pt c/o LE pain or numbness. Will return 9/22 for skin check.

## 2019-09-20 NOTE — PLAN OF CARE
/62 (BP Location: Left arm)   Pulse 82   Temp 98  F (36.7  C) (Oral)   Resp 18   Wt 87.8 kg (193 lb 9.6 oz)   SpO2 96%   BMI 35.41 kg/m      5719-5519: Patient slightly hypotensive OVSS on RA, afebrile. Generalized soreness controlled with repositioning; no c/o nausea or SOB. Tolerating 2g K diet with poor appetite, on marcie counts; NJ with cylced TF (2044-6356), started late @ 1900 d/t pt eating (wanting to wait until after dinner to start so as not to feel too full). Clear dx on nose intact, wound care placed for wound r/t NJ. R PIV SL. Voiding; 1 BM this shift. Incision scabbing, OBED site approximated; pt feels that there is painful scab in L ear (wants team to assess). Up with SBA and walker. Plans for ARU tomorrow if there is bed availability.   Will continue with POC and notify MD with changes or concerns.

## 2019-09-20 NOTE — PROGRESS NOTES
Transplant Surgery  Inpatient Daily Progress Note  09/20/2019    Assessment & Plan: Nicci Pemberton is a 58 yo female with a past medical history significant for end stage liver disease 2/2 ANGULO and alpha 1 anti-trypsin deficiency now s/p DD OLT on 8/18/19.      Graft function: DD OLT 8/18/19 without stent-POD #32  Transaminases, AP, Tbili stable.     Immunosuppression management:   Tac - stopped due to prolonged delirium  CSA started 9/2.  mg BID to titrate to a goal of 200-300. Level 9/20 284 (11 hour trough).  MMF increased to 1000 mg BID during transition of CNI. Due to nausea, changed to Myfortic 540 mg BID.   Pred 5 mg when transitioning IS meds, now discontinued.   Complexity of management: Medium. Contributing factors: anemia and induction encephalopathy.     Hematology: Acute blood loss anemia. Transfusion goal hgb > 7. Last transfused 3 units 8/20, 1 unit 9/8 and 1 unit 9/17.     HEENT: Epistaxis: due to friable mucosa, managed with packing. NJ tube with wound ulceration to Left nare.  Consult WOC.    Cardiorespiratory: Patient extubated 8/24. RA. Encourage IS and ambulation.     Neuro:   Toxic and metabolic encephalopathy: likely secondary to poor sleep, drugs (tac, pain medication), acute illness. Ordered melatonin to help patient sleep, patient's mental status has seemed to be improving overall.  -MRI head 9/3- results unremarkable. Discontinued Tacrolimus. Consulted neurology, recommended LP to rule out infectious etiology and Video EEG monitoring completed, no evidence of seizures.    9/5 LP, results thus far negative for infection. WBC 1. Protein 44 and glucose 51. Negative CSF studies: cryptococcal, enterovirus, HSV, VZV.    -VBG reviewed. Avoid medications that cause respiratory depression.   -Continue scheduled melatonin at bedtime.   -work on day/night cycles  -discontinued sitter 9/17 AM as patient is A&O, demonstrated nursing call button and is not pulling at FT     GI/Nutrition: 3gm K  diet, NJ in place. Continue EN- Nepro. Continue calorie counts, 563 yesterday.   Diarrhea- C diff positive 9/17, started PO Vanco x 10 days    Endocrine:   Steroid induced hyperglycemia- resolved    Fluid/Electrolytes:   GAMAL- CRRT stopped and patient has required no further HD. Incontinent of urine, so difficult to quantify, but Cr now normalized. SCr 0.8  Metabolic alkalosis, with compensated respiratory acidosis: CO2  28. Nephrology following, avoiding loop diuretics  Hyperkalemia: K 4.6-improved after transitioning to Nepro EN.    : Incontinent    Infectious disease: AF since with PO Vanco initiation   WBC WNL  UA/: moderate LE, 3 epis. UC with mixed edith.   Ppx with micafungin x 10 days completed, zosyn completed.   PPx with dapsone and valcyte.    Prophylaxis: Heparin subcutaneous TID.     Activity: OOB to chair today. PT/OT.     Disposition: 7A, ready to discharge to ARU on 9/17    Medical Decision Making: Medium  Subsequent visit 14171 (moderate level decision making)    VEENA/Fellow/Resident Provider: Juliet Philippe PA-C    Faculty: Hui Cross MD  ____________________________________________________________  Transplant History: Admitted 8/16/2019 for acute decompensated ANGULO.   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    8/18/2019 (Liver), Postoperative day: 33     Interval History:   AMS improving. NJ bridle noted to be embedded in nare.     ROS:   A 10-point review of systems was negative except as noted above.    Curent Meds:    aspirin  325 mg Oral Daily     clotrimazole  1 Molly Buccal 4x Daily     cycloSPORINE modified  225 mg Oral BID IS     dapsone  100 mg Oral Daily     famotidine  20 mg Oral Daily     heparin  5,000 Units Subcutaneous Q8H     levothyroxine  175 mcg Oral Daily     magnesium oxide  400 mg Oral Daily     melatonin  3 mg Oral At Bedtime     multivitamins w/minerals  15 mL Per Feeding Tube Daily     mycophenolic acid  540 mg Oral BID IS      protein modular  1 packet Per Feeding Tube Daily     sodium chloride  1 spray Both Nostrils Q4H     sodium chloride (PF)  3 mL Intravenous Q8H     sodium chloride (PF)  3 mL Intracatheter Q8H     valGANciclovir  450 mg Oral Daily     vancomycin  125 mg Oral 4x Daily     vitamin D3  1,000 Units Oral BID       Physical Exam:     Admit Weight: 104.3 kg (230 lb)    Current Vitals:   /59 (BP Location: Left arm)   Pulse 82   Temp 97.9  F (36.6  C) (Oral)   Resp 16   Wt 87.9 kg (193 lb 12.8 oz)   SpO2 93%   BMI 35.45 kg/m      Vital sign ranges:    Temp:  [97.5  F (36.4  C)-98.6  F (37  C)] 97.9  F (36.6  C)  Heart Rate:  [83-90] 90  Resp:  [16-18] 16  BP: ()/(59-69) 103/59  SpO2:  [92 %-96 %] 93 %  Patient Vitals for the past 24 hrs:   BP Temp Temp src Heart Rate Resp SpO2 Weight   09/20/19 1135 103/59 -- -- 90 -- 93 % --   09/20/19 1130 111/66 97.9  F (36.6  C) Oral 85 16 94 % --   09/20/19 1015 108/62 -- -- 87 -- 94 % --   09/20/19 0735 102/59 98.3  F (36.8  C) Oral 88 16 92 % --   09/20/19 0427 115/69 97.6  F (36.4  C) Oral 89 16 96 % --   09/20/19 0151 -- -- -- -- -- -- 87.9 kg (193 lb 12.8 oz)   09/20/19 0020 101/69 98.6  F (37  C) Oral 83 16 94 % --   09/19/19 2025 109/62 98  F (36.7  C) Oral 86 18 96 % --   09/19/19 1558 97/59 97.5  F (36.4  C) Oral 89 18 93 % --     General Appearance: NAD  HEENT: Feeding tube in place with EN infusing  Skin: normal, warm, scattered bruising  Heart: RRR  Lungs: NLB on RA  Abdomen: soft, nondistended,  incision sutured, c/d/i.   : Wearing depends, incontinent at times  Extremities: edema: present bilaterally. 1+.  Neurologic: alert, oriented x3. Tremor to LUE.     Data:   CMP  Recent Labs   Lab 09/20/19  0547 09/19/19  0628    134   POTASSIUM 4.6 4.5   CHLORIDE 99 99   CO2 28 31   GLC 95 93   BUN 33* 32*   CR 0.81 0.75   GFRESTIMATED 79 87   GFRESTBLACK >90 >90   JACOB 8.8 9.2   MAG 1.7 1.7   PHOS 4.4 4.4   ALBUMIN 2.2* 2.4*   BILITOTAL 1.1 1.3   ALKPHOS  204* 222*   AST 38 39   ALT 41 45     CBC  Recent Labs   Lab 09/20/19  0547 09/19/19  0628   HGB 7.9* 8.6*   WBC 4.7 5.3    183     COAGS  No lab results found in last 7 days.    Invalid input(s): XA

## 2019-09-20 NOTE — PROGRESS NOTES
[facility] Pre-Admission Screen    Referral Source: [address]  Admit date to referring facility: 2019    Rehab Diagnosis:    16 Debility (non-cardiac, non-pulmonary) s/p  donor liver transplant    Justification for Acute Inpatient Rehabilitation  Nicci Pemberton is a 58 yo female with a past medical history significant for end stage liver disease 2/2 ANGULO and alpha 1 anti-trypsin deficiency admitted from TCU on 19 for acute decompensated ANGULO. Pt s/p DD OLT on 19. Hospitalization c/b acute bld loss anemia, epistaxis, cardiorespiratory failure - extubated , GAMAL, and toxic and metabolic encephalopathy. Pt is medically ready to admit to acute inpatient rehab unit.   Patient requires an intensive inpatient rehab program to address the following acute impairments:impaired strength, impaired activity tolerance, edema management, impaired balance, impaired cognition, impaired judgement and safety awareness and impulsiveness    Current Active Medical Management Needs/Risks for Clinical Complications  The patient requires the high level of rehabilitation physician supervision that accompanies the provision of intensive rehabilitation therapy.  The patient needs the services of the rehabilitation physician to assess the patient medically and functionally and to modify the course of treatment as needed to maximize the patient's capacity to benefit from the rehabilitation process. Manage graft function in pt with recent liver tx - Transaminases, AP, Tbili stable. Manage immunosuppression - Tac - stopped due to prolonged delirium, CSA started .  mg BID to titrate to a goal of 200-300. Level  284 (11 hour trough),Myfortic 540 mg BID. Manage changes in labs - monitor hgb in pt with recent acute blood loss/anemia, pt with epistaxis with wound ulceration to left nare - WOC following.  Manage pulmonary status in pt recently intubated, extubated . Manage neuro status in pt  with toxic/metabolic encephalopathy - improving. Manage nutritional status in pt ieht alternative means of nutrition with po diet - continue calorie counts. GI status - C diff positive , started PO Vanco x 10 days. Manage renal function in pt with GAMAL - CRRT stopped - avoid loop diuretics per nephrology. Manage fluids/electrolytes in pt with recent hyperkalemia.     Past Medical/Surgical History   Surgery in the past 100 days: Yes   Additional relevant past medical history:  end stage liver disease 2/2 ANGULO and alpha 1 anti-trypsin deficiency now s/p DD OLT on 19.   Level of Functioning prior to Admission:  Home Environment  Lives with: spouse  Living arrangements: house  Home accessibility:  Split entry home  Stairs to enter home:  2 from front, 12 from back  Stairs to negotiate within home:  Yes, pt must perform stairs to access her bed/bathroom  Stair railings at home:  Not stated  Living environment comments:Pt pt she lives with her spouse in a house, reporting she was previously IND and did not use any AD.   Equipment currently used at home:  None.  Activity/exercise/self-care comment: Pt has been working with therapies at [facility] (19-19) prior to hospitalization for transplantation.     Ambulation: 0--> independent  Transferrin independent  Toiletin-->assistive equipment  Bathin independent  Dressin - independent   Eatin-->independent  Communication: 0-->understands/communicates without difficulty  Swallowin-->swallows foods/liquids without difficulty  Cognition: 0 - no cognition issues reported  Prior Functional Level Comment: Pt reporting she was IND with everything at her home and did not need any assistance. Pt was driving. She stopped working in early May 2019.      Current Level of Function grid:   PT Most Recent Goals for Rehab   Bed Rolling 4 Supervision or touching assitance 6 Independent   Supine to Sit 3 Partial/moderate assistance 6 Independent   Sit to Stand 3  Partial/moderate assistance 6 Independent   Transfer 3 Partial/moderate assistance 6 Independent   Ambulation 3 Partial/moderate assistance 5 Setup or clean-up assistance   Stairs 0 Not completed 5 Setup or clean-up assistance     OT Most Recent Goals for Rehab   Feeding 5 Setup or clean-up assistance 6 Independent   Grooming 4 Supervision or touching assitance 6 Independent   Bathing 3 Partial/moderate assistance 6 Independent   Upper Body Dressing 3 Partial/moderate assistance 6 Independent   Lower Body Dressing 3 Partial/moderate assistance 5 Setup or clean-up assistance   Toileting 3 Partial/moderate assistance 6 Independent   Toilet Transfer 3 Partial/moderate assistance 6 Independent   Tub/Shower Transfer 0 Not completed 6 Independent   Cognitive 4 Supervision or touching assitance 6 Independent     SLP Most Recent Goals for Rehab   Dysphagia 4 Supervision or touching assitance 6 Independent     Summary Statement:   Pt has been participating in PT/OT and making progress while demonstrating ability to tolerate three hours of therapy daily. Pt performs sit<>stand with minAx1 and FWW, ambulates to and from bathroom and performs toilet transfer with Rafa of 1-2 and FWW, heavy cueing for safe bathroom negotiation, total A for pericares. Pt ambulates 150ft + 50ft with CGA, FWW, and chair follow, requires seated rest break between bouts 2/2 fatigue, decreased gait speed throughout. Increasing B UE reliance on FWW as distance increases 2/2 LE fatigue. Pt was evaluated by SLP for swallowing post extubation and cleared for regular/thin liquid diet. Pt would benefit from full cognitive evaluation due to ongoing toxic/metabolic encephalopathy while demonstrating need for verbal cueing during therapies.     Expected Therapies and Services required during Inpatient Rehab admission  Intensity of Therapy: Patient requires intensive therapies not available in a lesser level of care. Patient is motivated, making gains, and can  tolerate 3 hours of therapy a day.  Physical Therapy: 60 minutes per day, at least 5 days a week for 10-14 days  Occupational Therapy: 60 minutes per day, at least 5 days a week for 10-14 days  Speech and Language Therapy: 60 minutes per day, at least 5 days a week for 10-14 days  Rehabilitation Nursing Needs: Patient requires 24 hour Rehab Nursing to manage wound care, vitals, carryover of new rehab techniques and care coordination. Education on new medications, monitor labs, monitor nutritional status - calorie counts as ordered.     Precautions/restrictions/special needs:   Precautions: fall precautions and abdominal precautions   Restrictions: none   Special Needs: isolation, Atmos Air Mattress.    Expected Level of Improvement:: Pt to achieve a level of mod IND to Indep w/ transfers, gait, stairs, and ADLs. Pt to also demonstrate improvements in her cognitive linguistic function so she can safely communicate her needs and have effective interactions. Pt to have A for IADLs at discharge.   Expected Length of time to achieve: 10-14 days    Anticipated Discharge Needs:  Anticipated Discharge Destination: Home  Anticipated Discharge Support: Family member  24/7 support available : Yes  Identified caregiver(s):   Hugo  Anticipated Discharge Needs: home with outpatient therapy    Identified challenges/barriers:None.    Liaison Signature: Ceci Perla MA, CCC-SLP        Physician statement of review and agreement:  I have reviewed and am in agreement of the need for IRF stay to address above functional and medical needs. In addition to above statements address, Patient requires intensive active and ongoing therapeutic intervention and multiple therapies; Patient requires medical supervision; Expected to actively participate in the intensive rehab program; Sufficiently stable to actively participate; Expectation for measurable improvement in functional capacity or adaption to impairments.

## 2019-09-20 NOTE — PLAN OF CARE
/69 (BP Location: Right arm)   Pulse 82   Temp 97.6  F (36.4  C) (Oral)   Resp 16   Wt 87.9 kg (193 lb 12.8 oz)   SpO2 96%   BMI 35.45 kg/m      Time: 0279-4817  Status:patient admitted for acute decompensated cirrhosis, GAMAL, dehydration/weakness and diarrhea. PMH of Liver transplant in 2018.   Neuro:  A&Ox4, slow to respond, intermittent confusion, very forgetful and   Vitals: vs stable on RA and afebrile.   Activity: SBA using walker, and gait bet to the bathroom.   Pain: denied   Cardiac: wnl  Respiratory: WNL  GI/: continent/incotinent of bladder, uses pull up and bathroom. No bm this shift.   Diet: NJ tube feeding at goal 60 ml from 6 pm to 10 am. 2 gram K+ diet.   Skin: jaundice, drain site to right abdomen primapore dressing CDI.   LDAs: NJ running 60 ml/hr, Rt PIV saline locked.   New change for this shift: none   Plan: continue on C-Diff treatment, ARU today if bed available.

## 2019-09-20 NOTE — PLAN OF CARE
/62 (BP Location: Left arm)   Pulse 82   Temp 97.7  F (36.5  C) (Oral)   Resp 15   Wt 87.9 kg (193 lb 12.8 oz)   SpO2 93%   BMI 35.45 kg/m     Neuro: A/Ox3. No confusion noted  VSS on RA.   Pain: c/o abdominal pain and received Tylenol 650mg with good effect  GI: Cycled TF 6pm-10am at 65ml/h and tolerating good. 3gr K diet with poor appetite, on calorie count but pt didn't eat. Denies nausea   BG WNL  : Voiding spontaneously without difficulty  Drains - PIV SL. NJ tube in place (noted small sore around nose bridge)  Activity - assist of 1 with walker/GB. Bed/chair alarm in place  Education - Fall prevention. Pain/bowel management  Plan of Care - waiting for bed in ARU  Pt's family at bedside and supportive.

## 2019-09-20 NOTE — DISCHARGE INSTRUCTIONS
Diet recommendations post-transplant: High protein diet x 8 weeks.  Heart healthy dietary habits long term (low saturated/trans fat, low sodium). Practice food safety precautions. See nutrition handout and food safety booklet for more information.

## 2019-09-20 NOTE — PROGRESS NOTES
Rehab Admissions:  Met with Nicci today to discuss [facility]. Educated pt on setup/structure of IRF including daily PMR mgmt of her medical and rehab needs, skilled PT/OT/SLP/edema needs for 3 hours per day, and skilled rehabilitative nursing cares. Pt reports she is very tired today, excusing self part-way thru meeting d/t not feeling well after eating. Provided pt w/ brochure and encouraged her to call w/ further questions.  Pt did received [employer] insurance authorization for IRF admission. Currently awaiting a bed on [facility].     Thank you for the referral, we will continue to follow this patient for post acute placement.     Determination of admission is based upon the patient's need for an intensive, interdisciplinary approach to rehabilitation, their ability to progress, their ability to tolerate intensive therapies, their need for daily physician supervision, their need for twenty four hour nursing assistance, and their ability and willingness to participate in such a program.    Bernarda Vazquez CM  Rehab Liaison/  [facility]  9/20/2019    2:53 PM

## 2019-09-20 NOTE — PLAN OF CARE
Post Liver Transplant Coordinator Discharge Visit       Patient aware of the need for follow up visit in Specialty Infusion and Procedure Room: pt and spouse are aware. Reminded to bring medication bottles, box and med card.     Patient informed of the need for primary care and surgeon appointments within 2 weeks if being discharged to home    Patient has transplant handbooks,  and other supplies as needed:Pt has handbook, and medication card,    Patient knows the names of immunosuppressive medications: Pt learning    Transition plan:Pt to go to TCU     Pharmacy to be used after discharge:[facility]     Home Health Care: Pt will have set up    Organ specific education completed, and discharge planning has been conducted with multidisciplinary transplant care team including physicians, pharmacy, nutrition, and social work.

## 2019-09-21 ENCOUNTER — APPOINTMENT (OUTPATIENT)
Dept: PHYSICAL THERAPY | Facility: CLINIC | Age: 59
DRG: 005 | End: 2019-09-21
Payer: COMMERCIAL

## 2019-09-21 LAB
ALBUMIN SERPL-MCNC: 2.3 G/DL (ref 3.4–5)
ALP SERPL-CCNC: 222 U/L (ref 40–150)
ALT SERPL W P-5'-P-CCNC: 47 U/L (ref 0–50)
ANION GAP SERPL CALCULATED.3IONS-SCNC: 7 MMOL/L (ref 3–14)
ANISOCYTOSIS BLD QL SMEAR: ABNORMAL
AST SERPL W P-5'-P-CCNC: 42 U/L (ref 0–45)
BASOPHILS # BLD AUTO: 0 10E9/L (ref 0–0.2)
BASOPHILS NFR BLD AUTO: 0 %
BILIRUB DIRECT SERPL-MCNC: 0.8 MG/DL (ref 0–0.2)
BILIRUB SERPL-MCNC: 1.2 MG/DL (ref 0.2–1.3)
BUN SERPL-MCNC: 35 MG/DL (ref 7–30)
CALCIUM SERPL-MCNC: 9 MG/DL (ref 8.5–10.1)
CHLORIDE SERPL-SCNC: 99 MMOL/L (ref 94–109)
CO2 SERPL-SCNC: 29 MMOL/L (ref 20–32)
CREAT SERPL-MCNC: 0.8 MG/DL (ref 0.52–1.04)
DIFFERENTIAL METHOD BLD: ABNORMAL
EOSINOPHIL # BLD AUTO: 0.7 10E9/L (ref 0–0.7)
EOSINOPHIL NFR BLD AUTO: 15.7 %
ERYTHROCYTE [DISTWIDTH] IN BLOOD BY AUTOMATED COUNT: 18.9 % (ref 10–15)
GFR SERPL CREATININE-BSD FRML MDRD: 80 ML/MIN/{1.73_M2}
GLUCOSE SERPL-MCNC: 90 MG/DL (ref 70–99)
HCT VFR BLD AUTO: 26.2 % (ref 35–47)
HGB BLD-MCNC: 7.8 G/DL (ref 11.7–15.7)
LYMPHOCYTES # BLD AUTO: 0.3 10E9/L (ref 0.8–5.3)
LYMPHOCYTES NFR BLD AUTO: 5.5 %
MACROCYTES BLD QL SMEAR: PRESENT
MAGNESIUM SERPL-MCNC: 1.8 MG/DL (ref 1.6–2.3)
MCH RBC QN AUTO: 30.6 PG (ref 26.5–33)
MCHC RBC AUTO-ENTMCNC: 29.8 G/DL (ref 31.5–36.5)
MCV RBC AUTO: 103 FL (ref 78–100)
MONOCYTES # BLD AUTO: 0.3 10E9/L (ref 0–1.3)
MONOCYTES NFR BLD AUTO: 7.1 %
NEUTROPHILS # BLD AUTO: 3.3 10E9/L (ref 1.6–8.3)
NEUTROPHILS NFR BLD AUTO: 71.7 %
PHOSPHATE SERPL-MCNC: 4.3 MG/DL (ref 2.5–4.5)
PLATELET # BLD AUTO: 195 10E9/L (ref 150–450)
PLATELET # BLD EST: ABNORMAL 10*3/UL
POTASSIUM SERPL-SCNC: 4.7 MMOL/L (ref 3.4–5.3)
PROT SERPL-MCNC: 5.8 G/DL (ref 6.8–8.8)
RBC # BLD AUTO: 2.55 10E12/L (ref 3.8–5.2)
SODIUM SERPL-SCNC: 134 MMOL/L (ref 133–144)
WBC # BLD AUTO: 4.6 10E9/L (ref 4–11)

## 2019-09-21 PROCEDURE — 25000131 ZZH RX MED GY IP 250 OP 636 PS 637: Performed by: NURSE PRACTITIONER

## 2019-09-21 PROCEDURE — 12000026 ZZH R&B TRANSPLANT

## 2019-09-21 PROCEDURE — 25000132 ZZH RX MED GY IP 250 OP 250 PS 637

## 2019-09-21 PROCEDURE — 84100 ASSAY OF PHOSPHORUS: CPT | Performed by: TRANSPLANT SURGERY

## 2019-09-21 PROCEDURE — 25000132 ZZH RX MED GY IP 250 OP 250 PS 637: Performed by: PHYSICIAN ASSISTANT

## 2019-09-21 PROCEDURE — 27210432 ZZH NUTRITION PRODUCT RENAL BASIC LITER

## 2019-09-21 PROCEDURE — 80053 COMPREHEN METABOLIC PANEL: CPT | Performed by: TRANSPLANT SURGERY

## 2019-09-21 PROCEDURE — 25000131 ZZH RX MED GY IP 250 OP 636 PS 637: Performed by: PHYSICIAN ASSISTANT

## 2019-09-21 PROCEDURE — 83735 ASSAY OF MAGNESIUM: CPT | Performed by: TRANSPLANT SURGERY

## 2019-09-21 PROCEDURE — 85025 COMPLETE CBC W/AUTO DIFF WBC: CPT | Performed by: TRANSPLANT SURGERY

## 2019-09-21 PROCEDURE — 36415 COLL VENOUS BLD VENIPUNCTURE: CPT | Performed by: TRANSPLANT SURGERY

## 2019-09-21 PROCEDURE — 25000132 ZZH RX MED GY IP 250 OP 250 PS 637: Performed by: SURGERY

## 2019-09-21 PROCEDURE — 82248 BILIRUBIN DIRECT: CPT | Performed by: TRANSPLANT SURGERY

## 2019-09-21 PROCEDURE — 25800030 ZZH RX IP 258 OP 636: Performed by: STUDENT IN AN ORGANIZED HEALTH CARE EDUCATION/TRAINING PROGRAM

## 2019-09-21 PROCEDURE — 25000128 H RX IP 250 OP 636: Performed by: PHYSICIAN ASSISTANT

## 2019-09-21 PROCEDURE — 25000132 ZZH RX MED GY IP 250 OP 250 PS 637: Performed by: NURSE PRACTITIONER

## 2019-09-21 PROCEDURE — 97530 THERAPEUTIC ACTIVITIES: CPT | Mod: GP

## 2019-09-21 PROCEDURE — 25000132 ZZH RX MED GY IP 250 OP 250 PS 637: Performed by: TRANSPLANT SURGERY

## 2019-09-21 PROCEDURE — 97116 GAIT TRAINING THERAPY: CPT | Mod: GP

## 2019-09-21 RX ORDER — HEPARIN SODIUM 5000 [USP'U]/.5ML
5000 INJECTION, SOLUTION INTRAVENOUS; SUBCUTANEOUS EVERY 8 HOURS
Status: ON HOLD | DISCHARGE
Start: 2019-09-21 | End: 2019-09-30

## 2019-09-21 RX ORDER — ONDANSETRON 4 MG/1
4 TABLET, ORALLY DISINTEGRATING ORAL EVERY 6 HOURS PRN
Status: ON HOLD | DISCHARGE
Start: 2019-09-21 | End: 2019-09-30

## 2019-09-21 RX ORDER — VANCOMYCIN HYDROCHLORIDE 50 MG/ML
125 KIT ORAL 4 TIMES DAILY
Status: ON HOLD | DISCHARGE
Start: 2019-09-21 | End: 2019-09-30

## 2019-09-21 RX ORDER — LEVOTHYROXINE SODIUM 175 UG/1
175 TABLET ORAL DAILY
Status: ON HOLD | DISCHARGE
Start: 2019-09-22 | End: 2019-09-30

## 2019-09-21 RX ORDER — ZINC SULFATE 50(220)MG
220 CAPSULE ORAL DAILY
Status: ON HOLD | DISCHARGE
Start: 2019-09-22 | End: 2019-09-30

## 2019-09-21 RX ORDER — AMOXICILLIN 250 MG
2 CAPSULE ORAL 2 TIMES DAILY PRN
Status: ON HOLD | DISCHARGE
Start: 2019-09-21 | End: 2019-09-30

## 2019-09-21 RX ORDER — ASPIRIN 325 MG
325 TABLET ORAL DAILY
Status: ON HOLD | DISCHARGE
Start: 2019-09-22 | End: 2019-09-30

## 2019-09-21 RX ORDER — ONDANSETRON 4 MG/1
4 TABLET, ORALLY DISINTEGRATING ORAL
Status: ON HOLD | DISCHARGE
Start: 2019-09-22 | End: 2019-09-30

## 2019-09-21 RX ORDER — ASCORBIC ACID 500 MG
500 TABLET ORAL DAILY
Status: ON HOLD | DISCHARGE
Start: 2019-09-22 | End: 2019-09-30

## 2019-09-21 RX ORDER — ONDANSETRON 4 MG/1
4 TABLET, ORALLY DISINTEGRATING ORAL
Status: DISCONTINUED | OUTPATIENT
Start: 2019-09-22 | End: 2019-09-23 | Stop reason: HOSPADM

## 2019-09-21 RX ORDER — POLYETHYLENE GLYCOL 3350 17 G/17G
17 POWDER, FOR SOLUTION ORAL DAILY PRN
Status: ON HOLD | DISCHARGE
Start: 2019-09-21 | End: 2019-09-30

## 2019-09-21 RX ORDER — CYCLOSPORINE 100 MG/ML
225 SOLUTION ORAL 2 TIMES DAILY
Status: ON HOLD | DISCHARGE
Start: 2019-09-21 | End: 2019-09-30

## 2019-09-21 RX ORDER — DAPSONE 100 MG/1
100 TABLET ORAL DAILY
Status: ON HOLD | DISCHARGE
Start: 2019-09-22 | End: 2019-09-30

## 2019-09-21 RX ORDER — VALGANCICLOVIR 450 MG/1
450 TABLET, FILM COATED ORAL DAILY
Status: ON HOLD | DISCHARGE
Start: 2019-09-22 | End: 2019-09-30

## 2019-09-21 RX ORDER — MYCOPHENOLIC ACID 180 MG/1
540 TABLET, DELAYED RELEASE ORAL 2 TIMES DAILY
DISCHARGE
Start: 2019-09-21 | End: 2019-09-23

## 2019-09-21 RX ORDER — LANOLIN ALCOHOL/MO/W.PET/CERES
3 CREAM (GRAM) TOPICAL AT BEDTIME
Status: ON HOLD | DISCHARGE
Start: 2019-09-21 | End: 2019-09-30

## 2019-09-21 RX ORDER — SIMETHICONE 80 MG
80 TABLET,CHEWABLE ORAL EVERY 6 HOURS PRN
Status: DISCONTINUED | OUTPATIENT
Start: 2019-09-21 | End: 2019-09-23 | Stop reason: HOSPADM

## 2019-09-21 RX ADMIN — MYCOPHENOLIC ACID 540 MG: 360 TABLET, DELAYED RELEASE ORAL at 08:35

## 2019-09-21 RX ADMIN — CYCLOSPORINE 225 MG: 100 SOLUTION ORAL at 08:36

## 2019-09-21 RX ADMIN — SALINE NASAL SPRAY 1 SPRAY: 1.5 SOLUTION NASAL at 04:34

## 2019-09-21 RX ADMIN — CLOTRIMAZOLE 1 TROCHE: 10 LOZENGE ORAL at 12:28

## 2019-09-21 RX ADMIN — CLOTRIMAZOLE 1 TROCHE: 10 LOZENGE ORAL at 08:36

## 2019-09-21 RX ADMIN — CLOTRIMAZOLE 1 TROCHE: 10 LOZENGE ORAL at 16:37

## 2019-09-21 RX ADMIN — LEVOTHYROXINE SODIUM 175 MCG: 175 TABLET ORAL at 08:35

## 2019-09-21 RX ADMIN — SALINE NASAL SPRAY 1 SPRAY: 1.5 SOLUTION NASAL at 08:47

## 2019-09-21 RX ADMIN — MULTIVITAMIN 15 ML: LIQUID ORAL at 08:36

## 2019-09-21 RX ADMIN — CYCLOSPORINE 225 MG: 100 SOLUTION ORAL at 19:04

## 2019-09-21 RX ADMIN — VANCOMYCIN HYDROCHLORIDE 125 MG: KIT at 20:11

## 2019-09-21 RX ADMIN — MELATONIN 1000 UNITS: at 20:12

## 2019-09-21 RX ADMIN — ZINC SULFATE CAP 220 MG (50 MG ELEMENTAL ZN) 220 MG: 220 (50 ZN) CAP at 08:35

## 2019-09-21 RX ADMIN — SALINE NASAL SPRAY 1 SPRAY: 1.5 SOLUTION NASAL at 12:28

## 2019-09-21 RX ADMIN — Medication 5000 UNITS: at 12:28

## 2019-09-21 RX ADMIN — CLOTRIMAZOLE 1 TROCHE: 10 LOZENGE ORAL at 20:11

## 2019-09-21 RX ADMIN — VALGANCICLOVIR 450 MG: 450 TABLET, FILM COATED ORAL at 08:36

## 2019-09-21 RX ADMIN — FAMOTIDINE 20 MG: 20 TABLET ORAL at 08:36

## 2019-09-21 RX ADMIN — Medication 5000 UNITS: at 04:33

## 2019-09-21 RX ADMIN — OXYMETAZOLINE HYDROCHLORIDE 2 SPRAY: 0.05 SPRAY NASAL at 20:12

## 2019-09-21 RX ADMIN — Medication 5000 UNITS: at 20:12

## 2019-09-21 RX ADMIN — MELATONIN 1000 UNITS: at 08:36

## 2019-09-21 RX ADMIN — SALINE NASAL SPRAY 1 SPRAY: 1.5 SOLUTION NASAL at 20:13

## 2019-09-21 RX ADMIN — Medication 1 PACKET: at 08:36

## 2019-09-21 RX ADMIN — VANCOMYCIN HYDROCHLORIDE 125 MG: KIT at 16:38

## 2019-09-21 RX ADMIN — SODIUM CHLORIDE, POTASSIUM CHLORIDE, SODIUM LACTATE AND CALCIUM CHLORIDE 500 ML: 600; 310; 30; 20 INJECTION, SOLUTION INTRAVENOUS at 05:44

## 2019-09-21 RX ADMIN — VANCOMYCIN HYDROCHLORIDE 125 MG: KIT at 08:36

## 2019-09-21 RX ADMIN — DAPSONE 100 MG: 25 TABLET ORAL at 08:35

## 2019-09-21 RX ADMIN — OXYCODONE HYDROCHLORIDE AND ACETAMINOPHEN 500 MG: 500 TABLET ORAL at 08:36

## 2019-09-21 RX ADMIN — VANCOMYCIN HYDROCHLORIDE 125 MG: KIT at 12:28

## 2019-09-21 RX ADMIN — MYCOPHENOLIC ACID 540 MG: 360 TABLET, DELAYED RELEASE ORAL at 19:04

## 2019-09-21 RX ADMIN — MELATONIN TAB 3 MG 3 MG: 3 TAB at 20:12

## 2019-09-21 RX ADMIN — SALINE NASAL SPRAY 1 SPRAY: 1.5 SOLUTION NASAL at 16:38

## 2019-09-21 RX ADMIN — MAGNESIUM OXIDE TAB 400 MG (241.3 MG ELEMENTAL MG) 400 MG: 400 (241.3 MG) TAB at 12:28

## 2019-09-21 RX ADMIN — ASPIRIN 325 MG ORAL TABLET 325 MG: 325 PILL ORAL at 08:35

## 2019-09-21 ASSESSMENT — ACTIVITIES OF DAILY LIVING (ADL)
ADLS_ACUITY_SCORE: 23

## 2019-09-21 NOTE — PLAN OF CARE
Neuro: A/Ox3.   VSS on RA.   Pain: c/o tolerable abdominal cramp, Simethicone ordered and available anytime pt wants to take it  GI: Cycled TF 6pm-10am at 65ml/h and tolerating good. 3gr K diet with poor appetite, on calorie count and pt was able to eat some food. Denies nausea, BMx1  : Voiding spontaneously without difficulty  Drains - PIV SL. NJ tube in place clamped. Abd incision intact SILVIA  Activity - assist of 1 with walker/GB. Bed/chair alarm in place  Education - Fall prevention. Pain/bowel management. Oral nutrition encouraged  Plan of Care - waiting for bed in ARU and possible discharge tomorrow  Pt's family at bedside and supportive.

## 2019-09-21 NOTE — PROGRESS NOTES
Social Work Services Progress Note    Hospital Day: 37  Date of Initial Social Work Evaluation:  8-  Collaborated with:  Team, bedside Rn, pt.     Data:  Nicci's attendant has been discontinued and she may be medically stable for discharge to rehab (ARU versus TCU) tomorrow.    Intervention:  ISABEL Chung team contacted SW that pt is ready to discharge to  ARU, Ceci at ARU possibly able to admit 9-22  pending female bed availability.Ride set up for HE w/c [facility] at 11:30 am 9-22. [facility] in the morning to determine feasibility of admission.      Assessment:   Nicci and her  Hugo are in agreement with transfer to Acute Rehab.    Plan:    Anticipated Disposition:  Facility:  [facility]    Barriers to d/c plan:  Bed availability     Follow Up:  SW to contact for bed availability, SW to be available  For discharge support

## 2019-09-21 NOTE — PROGRESS NOTES
Transplant Surgery  Inpatient Daily Progress Note  09/21/2019    Assessment & Plan: Nicci Pemberton is a 58 yo female with a past medical history significant for end stage liver disease 2/2 ANGULO and alpha 1 anti-trypsin deficiency now s/p DD OLT on 8/18/19.      Graft function: DD OLT 8/18/19 without stent-POD #32  Transaminases, AP, Tbili stable.     Immunosuppression management:   Tac - stopped due to prolonged delirium  CSA started 9/2.  mg BID to titrate to a goal of 200-300. Level 9/20 284 (11 hour trough).  MMF increased to 1000 mg BID during transition of CNI. Due to nausea, changed to Myfortic 540 mg BID.   Pred 5 mg when transitioning IS meds, now discontinued.   Complexity of management: Medium. Contributing factors: anemia and induction encephalopathy.     Hematology: Acute blood loss anemia. Transfusion goal hgb > 7. Last transfused 3 units 8/20, 1 unit 9/8 and 1 unit 9/17.     HEENT: Epistaxis: due to friable mucosa, managed with packing. NJ tube with wound ulceration to Left nare.  Consult WOC.    Cardiorespiratory: Patient extubated 8/24. RA. Encourage IS and ambulation.     Neuro:   Toxic and metabolic encephalopathy: likely secondary to poor sleep, drugs (tac, pain medication), acute illness. Ordered melatonin to help patient sleep, patient's mental status has seemed to be improving overall.  -MRI head 9/3- results unremarkable. Discontinued Tacrolimus. Consulted neurology, recommended LP to rule out infectious etiology and Video EEG monitoring completed, no evidence of seizures.    9/5 LP, results thus far negative for infection. WBC 1. Protein 44 and glucose 51. Negative CSF studies: cryptococcal, enterovirus, HSV, VZV.    -VBG reviewed. Avoid medications that cause respiratory depression.   -Continue scheduled melatonin at bedtime.   -work on day/night cycles  -discontinued sitter 9/17 AM as patient is A&O, demonstrated nursing call button and is not pulling at FT     GI/Nutrition: 3gm K  diet, NJ in place. Continue EN- Nepro. Continue calorie counts, 563 yesterday.   Diarrhea- C diff positive 9/17, started PO Vanco x 10 days    Endocrine:   Steroid induced hyperglycemia- resolved    Fluid/Electrolytes:   GAMAL- CRRT stopped and patient has required no further HD. Incontinent of urine, so difficult to quantify, but Cr now normalized. SCr 0.8  Metabolic alkalosis, with compensated respiratory acidosis: CO2  28. Nephrology following, avoiding loop diuretics  Hyperkalemia: K 4.6-improved after transitioning to Nepro EN.    : Incontinent    Infectious disease: AF since with PO Vanco initiation   WBC WNL  UA/: moderate LE, 3 epis. UC with mixed edith.   Ppx with micafungin x 10 days completed, zosyn completed.   PPx with dapsone and valcyte.    Prophylaxis: Heparin subcutaneous TID.     Activity: OOB to chair today. PT/OT.     Disposition: 7A, ready to discharge to ARU on 9/17, awaiting dispo    Medical Decision Making: Medium  Subsequent visit 12291 (moderate level decision making)    VEENA/Fellow/Resident Provider: Renee Allen MD Fellow [id number]    Faculty: Hui Cross MD  ____________________________________________________________  Transplant History: Admitted 8/16/2019 for acute decompensated ANGULO.   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    8/18/2019 (Liver), Postoperative day: 34     Interval History:   Mental status improved, eating and drinking, but still limited caloric intake.     ROS:   A 10-point review of systems was negative except as noted above.    Curent Meds:    aspirin  325 mg Oral Daily     clotrimazole  1 Molly Buccal 4x Daily     cycloSPORINE modified  225 mg Oral BID IS     dapsone  100 mg Oral Daily     famotidine  20 mg Oral Daily     heparin  5,000 Units Subcutaneous Q8H     levothyroxine  175 mcg Oral Daily     magnesium oxide  400 mg Oral Daily     melatonin  3 mg Oral At Bedtime     multivitamins w/minerals  15 mL Per Feeding Tube  Daily     mycophenolic acid  540 mg Oral BID IS     [START ON 9/22/2019] ondansetron  4 mg Oral QAM AC     protein modular  1 packet Per Feeding Tube Daily     sodium chloride  1 spray Both Nostrils Q4H     sodium chloride (PF)  3 mL Intravenous Q8H     sodium chloride (PF)  3 mL Intracatheter Q8H     valGANciclovir  450 mg Oral Daily     vancomycin  125 mg Oral 4x Daily     vitamin C  500 mg Oral Daily     vitamin D3  1,000 Units Oral BID     zinc sulfate  220 mg Oral Daily       Physical Exam:     Admit Weight: 104.3 kg (230 lb)    Current Vitals:   /73 (BP Location: Right arm)   Pulse 88   Temp 98.5  F (36.9  C) (Oral)   Resp 16   Wt 88 kg (194 lb 0.4 oz)   SpO2 93%   BMI 35.49 kg/m      Vital sign ranges:    Temp:  [97.7  F (36.5  C)-98.6  F (37  C)] 98.5  F (36.9  C)  Pulse:  [84-88] 88  Heart Rate:  [80-91] 88  Resp:  [15-18] 16  BP: ()/() 113/73  SpO2:  [93 %-98 %] 93 %  Patient Vitals for the past 24 hrs:   BP Temp Temp src Pulse Heart Rate Resp SpO2 Weight   09/21/19 1205 113/73 98.5  F (36.9  C) Oral 88 88 16 93 % --   09/21/19 0748 106/63 98.2  F (36.8  C) Oral 84 84 16 98 % --   09/21/19 0603 90/53 98.6  F (37  C) Oral -- -- 16 -- --   09/21/19 0439 -- -- -- -- -- -- -- 88 kg (194 lb 0.4 oz)   09/21/19 0327 (!) 89/55 98.6  F (37  C) Oral -- 91 18 95 % --   09/21/19 0202 (!) 87/57 -- -- -- -- -- 97 % --   09/21/19 0147 (!) 163/123 98.4  F (36.9  C) Oral -- -- 18 -- --   09/20/19 2006 -- -- -- -- -- -- 98 % --   09/20/19 2002 115/69 98.1  F (36.7  C) Oral -- 80 16 94 % --   09/20/19 1507 107/62 97.7  F (36.5  C) Oral -- 81 15 93 % --     General Appearance: NAD  HEENT: Feeding tube in place with EN infusing  Skin: normal, warm, scattered bruising  Heart: RRR  Lungs: NLB on RA  Abdomen: soft, nondistended,  incision sutured, c/d/i.   : Wearing depends, incontinent at times  Extremities: edema: present bilaterally. 1+.  Neurologic: alert, oriented x3. Tremor to LUE.     Data:    CMP  Recent Labs   Lab 09/21/19  0655 09/20/19  0547    134   POTASSIUM 4.7 4.6   CHLORIDE 99 99   CO2 29 28   GLC 90 95   BUN 35* 33*   CR 0.80 0.81   GFRESTIMATED 80 79   GFRESTBLACK >90 >90   JACOB 9.0 8.8   MAG 1.8 1.7   PHOS 4.3 4.4   ALBUMIN 2.3* 2.2*   BILITOTAL 1.2 1.1   ALKPHOS 222* 204*   AST 42 38   ALT 47 41     CBC  Recent Labs   Lab 09/21/19  0655 09/20/19  0547   HGB 7.8* 7.9*   WBC 4.6 4.7    192     COAGS  No lab results found in last 7 days.    Invalid input(s): XA

## 2019-09-21 NOTE — PROGRESS NOTES
Calorie Count  Intake recorded for: 9/20  Total Kcals: 360 Total Protein: 14g  Kcals from Hospital Food: 360   Protein: 14g  Kcals from Outside Food (average):0 Protein: 0g  # Meals Recorded: 3 meals ordered, no meals recorded.  # Supplements Recorded: 100% boost

## 2019-09-21 NOTE — PLAN OF CARE
Discharge Planner PT   Patient plan for discharge: ARU  Current status: Pt complete bed mobility and supine > sit SBA with HOB elevated and use of rails. Completes sit <> stand with CGA and FWW. Demonstrates safety with bathroom negotiation using FWW and CGA, needs cuing for grab bar use. Good standing balance for hand washing and self cares. Pt ambulates 200ft x 2 with FWW, wc follow and CGA, requires prolonged seated rest break between bouts.  Barriers to return to prior living situation: Medical status, decreased activity tolerance, strength, balance and safety  Recommendations for discharge: ARU  Rationale for recommendations: Pt is significantly below baseline with functional mobility, activity tolerance, balance and strength. Would benefit from intensive interdisciplinary therapy to improve these deficits and return to PLOF. Pt is able and willing to participate in 3 hours of therapy per day.        Entered by: Shari Parra 09/21/2019 2:27 PM

## 2019-09-21 NOTE — PHARMACY-TRANSPLANT NOTE
Solid Organ Transplant Recipient Prior to Discharge Note    59 year old female s/p liver transplant on 8/18/2019.    Pharmacy has monitored for medication interactions and immunosuppression levels in conjunction with the multidisciplinary team. In anticipation for discharge, medication therapy needs have been addressed daily throughout the current admission via multidisciplinary rounds and/or discussions, order verification, daily clinical pharmacy review, and communication with prescribers.  Axel Stevenson, PharmD, BCPS

## 2019-09-21 NOTE — PLAN OF CARE
BP (!) 89/55 (BP Location: Left arm)   Pulse 82   Temp 98.6  F (37  C) (Oral)   Resp 18   Wt 88 kg (194 lb 0.4 oz)   SpO2 95%   BMI 35.49 kg/m      5929-6414. Soft BP's, on-call surgery notified. LR bolus currently infusing per orders. OVSS on 1LNC. Denies pain and nausea. Poor appetite. NJ w/ TF @ 65 mL/hr. RPIV is SL. Voiding adequate amounts. Loose BM's. Incision is well approximated. Up with Ax1. Will continue to monitor and update with any changes.

## 2019-09-22 ENCOUNTER — APPOINTMENT (OUTPATIENT)
Dept: OCCUPATIONAL THERAPY | Facility: CLINIC | Age: 59
DRG: 005 | End: 2019-09-22
Payer: COMMERCIAL

## 2019-09-22 LAB
ALBUMIN SERPL-MCNC: 2.3 G/DL (ref 3.4–5)
ALP SERPL-CCNC: 216 U/L (ref 40–150)
ALT SERPL W P-5'-P-CCNC: 50 U/L (ref 0–50)
ANION GAP SERPL CALCULATED.3IONS-SCNC: 6 MMOL/L (ref 3–14)
ANISOCYTOSIS BLD QL SMEAR: ABNORMAL
AST SERPL W P-5'-P-CCNC: 48 U/L (ref 0–45)
BACTERIA SPEC CULT: NO GROWTH
BASOPHILS # BLD AUTO: 0.1 10E9/L (ref 0–0.2)
BASOPHILS NFR BLD AUTO: 2.7 %
BILIRUB DIRECT SERPL-MCNC: 0.7 MG/DL (ref 0–0.2)
BILIRUB SERPL-MCNC: 1.2 MG/DL (ref 0.2–1.3)
BUN SERPL-MCNC: 35 MG/DL (ref 7–30)
CALCIUM SERPL-MCNC: 9.2 MG/DL (ref 8.5–10.1)
CHLORIDE SERPL-SCNC: 102 MMOL/L (ref 94–109)
CO2 SERPL-SCNC: 31 MMOL/L (ref 20–32)
CREAT SERPL-MCNC: 0.76 MG/DL (ref 0.52–1.04)
DIFFERENTIAL METHOD BLD: ABNORMAL
EOSINOPHIL # BLD AUTO: 0.5 10E9/L (ref 0–0.7)
EOSINOPHIL NFR BLD AUTO: 9.9 %
ERYTHROCYTE [DISTWIDTH] IN BLOOD BY AUTOMATED COUNT: 19.3 % (ref 10–15)
GFR SERPL CREATININE-BSD FRML MDRD: 86 ML/MIN/{1.73_M2}
GLUCOSE SERPL-MCNC: 96 MG/DL (ref 70–99)
HCT VFR BLD AUTO: 25.8 % (ref 35–47)
HGB BLD-MCNC: 7.7 G/DL (ref 11.7–15.7)
LYMPHOCYTES # BLD AUTO: 0.3 10E9/L (ref 0.8–5.3)
LYMPHOCYTES NFR BLD AUTO: 6.3 %
Lab: NORMAL
MAGNESIUM SERPL-MCNC: 1.8 MG/DL (ref 1.6–2.3)
MCH RBC QN AUTO: 31 PG (ref 26.5–33)
MCHC RBC AUTO-ENTMCNC: 29.8 G/DL (ref 31.5–36.5)
MCV RBC AUTO: 104 FL (ref 78–100)
MONOCYTES # BLD AUTO: 0.4 10E9/L (ref 0–1.3)
MONOCYTES NFR BLD AUTO: 9 %
NEUTROPHILS # BLD AUTO: 3.5 10E9/L (ref 1.6–8.3)
NEUTROPHILS NFR BLD AUTO: 72.1 %
NRBC # BLD AUTO: 0 10*3/UL
NRBC BLD AUTO-RTO: 1 /100
PHOSPHATE SERPL-MCNC: 4.4 MG/DL (ref 2.5–4.5)
PLATELET # BLD AUTO: 207 10E9/L (ref 150–450)
PLATELET # BLD EST: ABNORMAL 10*3/UL
POTASSIUM SERPL-SCNC: 4.8 MMOL/L (ref 3.4–5.3)
PROT SERPL-MCNC: 5.6 G/DL (ref 6.8–8.8)
RBC # BLD AUTO: 2.48 10E12/L (ref 3.8–5.2)
SODIUM SERPL-SCNC: 138 MMOL/L (ref 133–144)
SPECIMEN SOURCE: NORMAL
WBC # BLD AUTO: 4.8 10E9/L (ref 4–11)

## 2019-09-22 PROCEDURE — 97110 THERAPEUTIC EXERCISES: CPT | Mod: GO

## 2019-09-22 PROCEDURE — 25000132 ZZH RX MED GY IP 250 OP 250 PS 637: Performed by: NURSE PRACTITIONER

## 2019-09-22 PROCEDURE — 25000132 ZZH RX MED GY IP 250 OP 250 PS 637

## 2019-09-22 PROCEDURE — 36415 COLL VENOUS BLD VENIPUNCTURE: CPT | Performed by: TRANSPLANT SURGERY

## 2019-09-22 PROCEDURE — 25000132 ZZH RX MED GY IP 250 OP 250 PS 637: Performed by: TRANSPLANT SURGERY

## 2019-09-22 PROCEDURE — 25000132 ZZH RX MED GY IP 250 OP 250 PS 637: Performed by: SURGERY

## 2019-09-22 PROCEDURE — 25000132 ZZH RX MED GY IP 250 OP 250 PS 637: Performed by: PHYSICIAN ASSISTANT

## 2019-09-22 PROCEDURE — 97535 SELF CARE MNGMENT TRAINING: CPT | Mod: GO

## 2019-09-22 PROCEDURE — 83735 ASSAY OF MAGNESIUM: CPT | Performed by: TRANSPLANT SURGERY

## 2019-09-22 PROCEDURE — 85025 COMPLETE CBC W/AUTO DIFF WBC: CPT | Performed by: TRANSPLANT SURGERY

## 2019-09-22 PROCEDURE — 25000131 ZZH RX MED GY IP 250 OP 636 PS 637: Performed by: PHYSICIAN ASSISTANT

## 2019-09-22 PROCEDURE — 12000026 ZZH R&B TRANSPLANT

## 2019-09-22 PROCEDURE — 82248 BILIRUBIN DIRECT: CPT | Performed by: TRANSPLANT SURGERY

## 2019-09-22 PROCEDURE — 84100 ASSAY OF PHOSPHORUS: CPT | Performed by: TRANSPLANT SURGERY

## 2019-09-22 PROCEDURE — 25000131 ZZH RX MED GY IP 250 OP 636 PS 637: Performed by: NURSE PRACTITIONER

## 2019-09-22 PROCEDURE — 25000128 H RX IP 250 OP 636: Performed by: PHYSICIAN ASSISTANT

## 2019-09-22 PROCEDURE — 80053 COMPREHEN METABOLIC PANEL: CPT | Performed by: TRANSPLANT SURGERY

## 2019-09-22 RX ADMIN — MAGNESIUM OXIDE TAB 400 MG (241.3 MG ELEMENTAL MG) 400 MG: 400 (241.3 MG) TAB at 11:56

## 2019-09-22 RX ADMIN — CYCLOSPORINE 225 MG: 100 SOLUTION ORAL at 18:27

## 2019-09-22 RX ADMIN — SIMETHICONE CHEW TAB 80 MG 80 MG: 80 TABLET ORAL at 20:13

## 2019-09-22 RX ADMIN — CYCLOSPORINE 225 MG: 100 SOLUTION ORAL at 09:37

## 2019-09-22 RX ADMIN — DAPSONE 100 MG: 25 TABLET ORAL at 09:29

## 2019-09-22 RX ADMIN — MULTIVITAMIN 15 ML: LIQUID ORAL at 09:28

## 2019-09-22 RX ADMIN — SALINE NASAL SPRAY 1 SPRAY: 1.5 SOLUTION NASAL at 15:35

## 2019-09-22 RX ADMIN — FAMOTIDINE 20 MG: 20 TABLET ORAL at 09:35

## 2019-09-22 RX ADMIN — SIMETHICONE CHEW TAB 80 MG 80 MG: 80 TABLET ORAL at 11:56

## 2019-09-22 RX ADMIN — MELATONIN TAB 3 MG 3 MG: 3 TAB at 20:14

## 2019-09-22 RX ADMIN — VANCOMYCIN HYDROCHLORIDE 125 MG: KIT at 20:15

## 2019-09-22 RX ADMIN — LEVOTHYROXINE SODIUM 175 MCG: 175 TABLET ORAL at 09:36

## 2019-09-22 RX ADMIN — MELATONIN 1000 UNITS: at 20:14

## 2019-09-22 RX ADMIN — CLOTRIMAZOLE 1 TROCHE: 10 LOZENGE ORAL at 20:13

## 2019-09-22 RX ADMIN — ZINC SULFATE CAP 220 MG (50 MG ELEMENTAL ZN) 220 MG: 220 (50 ZN) CAP at 09:36

## 2019-09-22 RX ADMIN — VANCOMYCIN HYDROCHLORIDE 125 MG: KIT at 12:55

## 2019-09-22 RX ADMIN — Medication 1 PACKET: at 09:41

## 2019-09-22 RX ADMIN — MYCOPHENOLIC ACID 540 MG: 360 TABLET, DELAYED RELEASE ORAL at 18:27

## 2019-09-22 RX ADMIN — Medication 5000 UNITS: at 11:56

## 2019-09-22 RX ADMIN — CLOTRIMAZOLE 1 TROCHE: 10 LOZENGE ORAL at 15:35

## 2019-09-22 RX ADMIN — OXYCODONE HYDROCHLORIDE AND ACETAMINOPHEN 500 MG: 500 TABLET ORAL at 09:32

## 2019-09-22 RX ADMIN — VALGANCICLOVIR 450 MG: 450 TABLET, FILM COATED ORAL at 09:34

## 2019-09-22 RX ADMIN — CLOTRIMAZOLE 1 TROCHE: 10 LOZENGE ORAL at 12:55

## 2019-09-22 RX ADMIN — ASPIRIN 325 MG ORAL TABLET 325 MG: 325 PILL ORAL at 09:42

## 2019-09-22 RX ADMIN — Medication 5000 UNITS: at 20:14

## 2019-09-22 RX ADMIN — Medication 5000 UNITS: at 04:00

## 2019-09-22 RX ADMIN — SALINE NASAL SPRAY 1 SPRAY: 1.5 SOLUTION NASAL at 09:28

## 2019-09-22 RX ADMIN — SALINE NASAL SPRAY 1 SPRAY: 1.5 SOLUTION NASAL at 04:00

## 2019-09-22 RX ADMIN — CLOTRIMAZOLE 1 TROCHE: 10 LOZENGE ORAL at 09:28

## 2019-09-22 RX ADMIN — SALINE NASAL SPRAY 1 SPRAY: 1.5 SOLUTION NASAL at 12:55

## 2019-09-22 RX ADMIN — SALINE NASAL SPRAY 1 SPRAY: 1.5 SOLUTION NASAL at 20:16

## 2019-09-22 RX ADMIN — VANCOMYCIN HYDROCHLORIDE 125 MG: KIT at 09:37

## 2019-09-22 RX ADMIN — MELATONIN 1000 UNITS: at 09:29

## 2019-09-22 RX ADMIN — MYCOPHENOLIC ACID 540 MG: 360 TABLET, DELAYED RELEASE ORAL at 09:32

## 2019-09-22 RX ADMIN — VANCOMYCIN HYDROCHLORIDE 125 MG: KIT at 16:25

## 2019-09-22 ASSESSMENT — ACTIVITIES OF DAILY LIVING (ADL)
ADLS_ACUITY_SCORE: 24
ADLS_ACUITY_SCORE: 21
ADLS_ACUITY_SCORE: 24
ADLS_ACUITY_SCORE: 24
ADLS_ACUITY_SCORE: 23
ADLS_ACUITY_SCORE: 21

## 2019-09-22 NOTE — PLAN OF CARE
/66 (BP Location: Right arm)   Pulse 85   Temp 98.5  F (36.9  C) (Oral)   Resp 16   Wt 88 kg (194 lb 0.4 oz)   SpO2 90%   BMI 35.49 kg/m      3492-8947. AVSS on RA. Denies pain and nausea. Poor oral intake. NJ w/ TF @ 65 mL. RPIV is SL. Voiding adequate amounts. Having loose BMs. Incision is well approximated. Up with A x 1. Setting off BA when needs restroom. Will continue to monitor and update with any changes.

## 2019-09-22 NOTE — DOWNTIME EVENT NOTE
The EMR was down for 3.5 hours on 9/22/2019.    Corinna was responsible for completing the paper charting during this time period.     The following information was re-entered into the system by Corinna Rothman RN: MAR    The following information will remain in the paper chart: none    Corinna Rothman RN  9/22/2019

## 2019-09-22 NOTE — PLAN OF CARE
BP 97/53 (BP Location: Left arm)   Pulse 85   Temp 97.6  F (36.4  C) (Oral)   Resp 16   Wt 88.8 kg (195 lb 11.2 oz)   SpO2 91%   BMI 35.79 kg/m       Neuro: A/Ox3, slow to respond  VSS on RA when awake and using O2 1-2L NC when sleeping  Pain c/o abdominal cramp treated with Simethiconex1  GI: on 3gr K diet with poor appetite. Smear BMx3. Denies nausea  : Voiding frequently without difficulty  Drains - PIV SL. Abd incision intact, BLE lymphedema wraps intact  Activity - assist of 1 with walker/GB walker in taveras with some c/o of knee discomfort but tolerable  Education - med card updated, lab printout given to pt  Plan of Care - possible discharge tomorrow to ARU

## 2019-09-22 NOTE — PROGRESS NOTES
Transplant Surgery  Inpatient Daily Progress Note  09/22/2019    Assessment & Plan: Nicci Pemberton is a 58 yo female with a past medical history significant for end stage liver disease 2/2 ANGULO and alpha 1 anti-trypsin deficiency now s/p DD OLT on 8/18/19.      Graft function: DD OLT 8/18/19 without stent-POD #35  Transaminases, AP, Tbili stable.     Immunosuppression management:   Tac - stopped due to prolonged delirium  CSA started 9/2.  mg BID to titrate to a goal of 200-300. Level 9/20 284 (11 hour trough).  MMF increased to 1000 mg BID during transition of CNI. Due to nausea, changed to Myfortic 540 mg BID.   Pred 5 mg when transitioning IS meds, now discontinued.   Complexity of management: Medium. Contributing factors: anemia and induction encephalopathy.     Hematology: Acute blood loss anemia. Transfusion goal hgb > 7. Last transfused 3 units 8/20, 1 unit 9/8 and 1 unit 9/17.     HEENT: Epistaxis: due to friable mucosa, managed with packing. NJ tube with wound ulceration to Left nare.  Consult WOC.    Cardiorespiratory: Patient extubated 8/24. RA. Encourage IS and ambulation.     Neuro:   Toxic and metabolic encephalopathy: likely secondary to poor sleep, drugs (tac, pain medication), acute illness. Ordered melatonin to help patient sleep, patient's mental status has seemed to be improving overall.  -MRI head 9/3- results unremarkable. Discontinued Tacrolimus. Consulted neurology, recommended LP to rule out infectious etiology and Video EEG monitoring completed, no evidence of seizures.    9/5 LP, results thus far negative for infection. WBC 1. Protein 44 and glucose 51. Negative CSF studies: cryptococcal, enterovirus, HSV, VZV.    -VBG reviewed. Avoid medications that cause respiratory depression.   -Continue scheduled melatonin at bedtime.   -work on day/night cycles  -discontinued sitter 9/17 AM as patient is A&O, demonstrated nursing call button and is not pulling at FT     GI/Nutrition: 3gm K  diet, NJ in place. Continue EN- Nepro. Continue calorie counts, 563 yesterday.   Diarrhea- C diff positive 9/17, started PO Vanco x 10 days    Endocrine:   Steroid induced hyperglycemia- resolved    Fluid/Electrolytes:   GAMAL- CRRT stopped and patient has required no further HD. Incontinent of urine, so difficult to quantify, but Cr now normalized. SCr 0.8  Metabolic alkalosis, with compensated respiratory acidosis: CO2  28. Nephrology following, avoiding loop diuretics  Hyperkalemia: K 4.6-improved after transitioning to Nepro EN.    : Incontinent    Infectious disease: AF since with PO Vanco initiation   WBC WNL  UA/: moderate LE, 3 epis. UC with mixed edith.   Ppx with micafungin x 10 days completed, zosyn completed.   PPx with dapsone and valcyte.    Prophylaxis: Heparin subcutaneous TID.     Activity: OOB to chair today. PT/OT.     Disposition: 7A, ready to discharge to ARU on 9/17, awaiting dispo, likely tomorrow, 9/23    Medical Decision Making: Medium  Subsequent visit 31457 (moderate level decision making)    VEENA/Fellow/Resident Provider: Renee Allen MD Fellow [id number]    Faculty: Hui Cross MD  ____________________________________________________________  Transplant History: Admitted 8/16/2019 for acute decompensated ANGULO.   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    8/18/2019 (Liver), Postoperative day: 35     Interval History:   Mental status improved, eating and drinking more, ambulating, pain controlled    ROS:   A 10-point review of systems was negative except as noted above.    Curent Meds:    aspirin  325 mg Oral Daily     clotrimazole  1 Molly Buccal 4x Daily     cycloSPORINE modified  225 mg Oral BID IS     dapsone  100 mg Oral Daily     famotidine  20 mg Oral Daily     heparin  5,000 Units Subcutaneous Q8H     levothyroxine  175 mcg Oral Daily     magnesium oxide  400 mg Oral Daily     melatonin  3 mg Oral At Bedtime     multivitamins w/minerals  15  mL Per Feeding Tube Daily     mycophenolic acid  540 mg Oral BID IS     ondansetron  4 mg Oral QAM AC     protein modular  1 packet Per Feeding Tube Daily     sodium chloride  1 spray Both Nostrils Q4H     sodium chloride (PF)  3 mL Intravenous Q8H     sodium chloride (PF)  3 mL Intracatheter Q8H     valGANciclovir  450 mg Oral Daily     vancomycin  125 mg Oral 4x Daily     vitamin C  500 mg Oral Daily     vitamin D3  1,000 Units Oral BID     zinc sulfate  220 mg Oral Daily       Physical Exam:     Admit Weight: 104.3 kg (230 lb)    Current Vitals:   /62 (BP Location: Left arm)   Pulse 85   Temp 97.9  F (36.6  C) (Oral)   Resp 14   Wt 88.8 kg (195 lb 11.2 oz)   SpO2 99%   BMI 35.79 kg/m      Vital sign ranges:    Temp:  [97.7  F (36.5  C)-98.5  F (36.9  C)] 97.9  F (36.6  C)  Pulse:  [85-88] 85  Heart Rate:  [84-95] 84  Resp:  [14-16] 14  BP: (102-113)/(52-73) 112/62  SpO2:  [90 %-99 %] 99 %  Patient Vitals for the past 24 hrs:   BP Temp Temp src Pulse Heart Rate Resp SpO2 Weight   09/22/19 0821 112/62 97.9  F (36.6  C) Oral -- 84 14 99 % --   09/22/19 0400 105/55 97.7  F (36.5  C) Oral -- 95 16 93 % 88.8 kg (195 lb 11.2 oz)   09/22/19 0012 105/66 98.5  F (36.9  C) Oral -- 87 16 90 % --   09/21/19 2008 102/52 97.7  F (36.5  C) Oral 85 -- 16 95 % --   09/21/19 1616 103/60 98.1  F (36.7  C) Oral 86 86 16 90 % --   09/21/19 1205 113/73 98.5  F (36.9  C) Oral 88 88 16 93 % --     General Appearance: NAD  HEENT: Feeding tube in place with EN infusing, Left ear with high wax burden, tympanic membrane normal, no injection or purulence  Skin: normal, warm, scattered bruising  Heart: RRR  Lungs: NLB on RA  Abdomen: soft, nondistended,  incision sutured, c/d/i.   : Wearing depends, incontinent at times  Extremities: edema: present bilaterally. 1+.  Neurologic: alert, oriented x3. Tremor to LUE.     Data:   CMP  Recent Labs   Lab 09/22/19  0711 09/21/19  0655    134   POTASSIUM 4.8 4.7   CHLORIDE 102 99    CO2 31 29   GLC 96 90   BUN 35* 35*   CR 0.76 0.80   GFRESTIMATED 86 80   GFRESTBLACK >90 >90   JACOB 9.2 9.0   MAG 1.8 1.8   PHOS 4.4 4.3   ALBUMIN 2.3* 2.3*   BILITOTAL 1.2 1.2   ALKPHOS 216* 222*   AST 48* 42   ALT 50 47     CBC  Recent Labs   Lab 09/22/19  0711 09/21/19  0655   HGB 7.7* 7.8*   WBC 4.8 4.6    195     COAGS  No lab results found in last 7 days.    Invalid input(s): XA

## 2019-09-22 NOTE — PLAN OF CARE
OT/7A - Discharge Planner OT   Patient plan for discharge: ARU tomorrow  Current status: Facilitated functional mobility to/from bathroom with FWW and CGA-SBA for ADL.  SBA for toilet transfer with use of grab bars. Set up assist and SBA for standing ADL at sink.  Facilitated 5 UE AROM exercises while seated in bedside recliner. VSS on 1-3L NC.  Barriers to return to prior living situation: cognition, strength, activity tolerance, post surgical precautions  Recommendations for discharge: ARU  Rationale for recommendations: Pt demonstrates motivation to return home and to continue working with therapy. Pt will likely tolerate 3 hours of therapy daily to reach goals. Pt has good family support.        Entered by: Kimberley Chicas 09/22/2019 2:31 PM

## 2019-09-22 NOTE — PROGRESS NOTES
Spoke with [facility] who stated they do not have a bed available today for pt today. Possibly could admit tomorrow pending discharges. Cancelled HE ride.    Addendum: ARU reports will have bed available tomorrow, requested ride between [id number]. HE ride set for 1200.

## 2019-09-22 NOTE — PROGRESS NOTES
Calorie Count  Intake recorded for: 9/21  Total Kcals: 406 Total Protein: 26g  Kcals from Hospital Food: 406   Protein: 26g  Kcals from Outside Food (average):0  Protein: 0g  # Meals Recorded: 2 (First - 100% ice cream, 50% whole wheat toast, 25% watermelon)     (Second - 50% mashed potato w/ gravy, 25% roasted turkey, less than 25% green beans, pudding)  # Supplements Recorded: 0

## 2019-09-23 ENCOUNTER — HOSPITAL ENCOUNTER (INPATIENT)
Facility: CLINIC | Age: 59
LOS: 8 days | Discharge: HOME HEALTH | DRG: 949 | End: 2019-10-01
Attending: PHYSICAL MEDICINE & REHABILITATION | Admitting: PHYSICAL MEDICINE & REHABILITATION
Payer: COMMERCIAL

## 2019-09-23 VITALS
HEART RATE: 85 BPM | BODY MASS INDEX: 35.59 KG/M2 | DIASTOLIC BLOOD PRESSURE: 68 MMHG | SYSTOLIC BLOOD PRESSURE: 109 MMHG | OXYGEN SATURATION: 95 % | WEIGHT: 194.6 LBS | RESPIRATION RATE: 16 BRPM | TEMPERATURE: 98.6 F

## 2019-09-23 DIAGNOSIS — Z94.4 STATUS POST LIVER TRANSPLANTATION (H): Chronic | ICD-10-CM

## 2019-09-23 DIAGNOSIS — Z94.4 S/P LIVER TRANSPLANT (H): ICD-10-CM

## 2019-09-23 DIAGNOSIS — G44.209 TENSION HEADACHE: Primary | ICD-10-CM

## 2019-09-23 DIAGNOSIS — E43 SEVERE MALNUTRITION (H): ICD-10-CM

## 2019-09-23 LAB
ALBUMIN SERPL-MCNC: 2.3 G/DL (ref 3.4–5)
ALP SERPL-CCNC: 230 U/L (ref 40–150)
ALT SERPL W P-5'-P-CCNC: 57 U/L (ref 0–50)
ANION GAP SERPL CALCULATED.3IONS-SCNC: 4 MMOL/L (ref 3–14)
AST SERPL W P-5'-P-CCNC: 52 U/L (ref 0–45)
BASOPHILS # BLD AUTO: 0 10E9/L (ref 0–0.2)
BASOPHILS NFR BLD AUTO: 0.6 %
BILIRUB DIRECT SERPL-MCNC: 0.7 MG/DL (ref 0–0.2)
BILIRUB SERPL-MCNC: 1 MG/DL (ref 0.2–1.3)
BUN SERPL-MCNC: 36 MG/DL (ref 7–30)
CALCIUM SERPL-MCNC: 9.3 MG/DL (ref 8.5–10.1)
CHLORIDE SERPL-SCNC: 103 MMOL/L (ref 94–109)
CO2 SERPL-SCNC: 31 MMOL/L (ref 20–32)
CREAT SERPL-MCNC: 0.78 MG/DL (ref 0.52–1.04)
CYCLOSPORINE BLD LC/MS/MS-MCNC: 282 UG/L (ref 50–400)
DIFFERENTIAL METHOD BLD: ABNORMAL
EOSINOPHIL # BLD AUTO: 1 10E9/L (ref 0–0.7)
EOSINOPHIL NFR BLD AUTO: 19.8 %
ERYTHROCYTE [DISTWIDTH] IN BLOOD BY AUTOMATED COUNT: 19.3 % (ref 10–15)
GFR SERPL CREATININE-BSD FRML MDRD: 83 ML/MIN/{1.73_M2}
GLUCOSE SERPL-MCNC: 104 MG/DL (ref 70–99)
HCT VFR BLD AUTO: 26.5 % (ref 35–47)
HGB BLD-MCNC: 7.9 G/DL (ref 11.7–15.7)
IMM GRANULOCYTES # BLD: 0.2 10E9/L (ref 0–0.4)
IMM GRANULOCYTES NFR BLD: 4.1 %
LYMPHOCYTES # BLD AUTO: 0.4 10E9/L (ref 0.8–5.3)
LYMPHOCYTES NFR BLD AUTO: 8.6 %
MAGNESIUM SERPL-MCNC: 1.7 MG/DL (ref 1.6–2.3)
MCH RBC QN AUTO: 31.3 PG (ref 26.5–33)
MCHC RBC AUTO-ENTMCNC: 29.8 G/DL (ref 31.5–36.5)
MCV RBC AUTO: 105 FL (ref 78–100)
MONOCYTES # BLD AUTO: 0.6 10E9/L (ref 0–1.3)
MONOCYTES NFR BLD AUTO: 12.3 %
NEUTROPHILS # BLD AUTO: 2.7 10E9/L (ref 1.6–8.3)
NEUTROPHILS NFR BLD AUTO: 54.6 %
NRBC # BLD AUTO: 0 10*3/UL
NRBC BLD AUTO-RTO: 0 /100
PHOSPHATE SERPL-MCNC: 5 MG/DL (ref 2.5–4.5)
PLATELET # BLD AUTO: 213 10E9/L (ref 150–450)
POTASSIUM SERPL-SCNC: 4.7 MMOL/L (ref 3.4–5.3)
PROT SERPL-MCNC: 5.6 G/DL (ref 6.8–8.8)
RBC # BLD AUTO: 2.52 10E12/L (ref 3.8–5.2)
SODIUM SERPL-SCNC: 138 MMOL/L (ref 133–144)
TME LAST DOSE: NORMAL H
WBC # BLD AUTO: 4.9 10E9/L (ref 4–11)

## 2019-09-23 PROCEDURE — 82248 BILIRUBIN DIRECT: CPT | Performed by: TRANSPLANT SURGERY

## 2019-09-23 PROCEDURE — 97530 THERAPEUTIC ACTIVITIES: CPT | Mod: GP

## 2019-09-23 PROCEDURE — 99207 ZZC CONSULT E&M CHANGED TO INITIAL LEVEL: CPT | Performed by: HOSPITALIST

## 2019-09-23 PROCEDURE — 25000131 ZZH RX MED GY IP 250 OP 636 PS 637: Performed by: HOSPITALIST

## 2019-09-23 PROCEDURE — 25000131 ZZH RX MED GY IP 250 OP 636 PS 637: Performed by: PHYSICIAN ASSISTANT

## 2019-09-23 PROCEDURE — 25000132 ZZH RX MED GY IP 250 OP 250 PS 637: Performed by: PHYSICIAN ASSISTANT

## 2019-09-23 PROCEDURE — 25000132 ZZH RX MED GY IP 250 OP 250 PS 637: Performed by: HOSPITALIST

## 2019-09-23 PROCEDURE — 97162 PT EVAL MOD COMPLEX 30 MIN: CPT | Mod: GP

## 2019-09-23 PROCEDURE — 36415 COLL VENOUS BLD VENIPUNCTURE: CPT | Performed by: TRANSPLANT SURGERY

## 2019-09-23 PROCEDURE — 80053 COMPREHEN METABOLIC PANEL: CPT | Performed by: TRANSPLANT SURGERY

## 2019-09-23 PROCEDURE — 99222 1ST HOSP IP/OBS MODERATE 55: CPT | Performed by: HOSPITALIST

## 2019-09-23 PROCEDURE — 85025 COMPLETE CBC W/AUTO DIFF WBC: CPT | Performed by: TRANSPLANT SURGERY

## 2019-09-23 PROCEDURE — 25000131 ZZH RX MED GY IP 250 OP 636 PS 637: Performed by: NURSE PRACTITIONER

## 2019-09-23 PROCEDURE — 12800006 ZZH R&B REHAB

## 2019-09-23 PROCEDURE — 83735 ASSAY OF MAGNESIUM: CPT | Performed by: TRANSPLANT SURGERY

## 2019-09-23 PROCEDURE — 25000128 H RX IP 250 OP 636: Performed by: HOSPITALIST

## 2019-09-23 PROCEDURE — G0463 HOSPITAL OUTPT CLINIC VISIT: HCPCS

## 2019-09-23 PROCEDURE — 25000132 ZZH RX MED GY IP 250 OP 250 PS 637

## 2019-09-23 PROCEDURE — 25000131 ZZH RX MED GY IP 250 OP 636 PS 637: Performed by: INTERNAL MEDICINE

## 2019-09-23 PROCEDURE — 27210432 ZZH NUTRITION PRODUCT RENAL BASIC LITER

## 2019-09-23 PROCEDURE — 25000128 H RX IP 250 OP 636: Performed by: PHYSICIAN ASSISTANT

## 2019-09-23 PROCEDURE — 80158 DRUG ASSAY CYCLOSPORINE: CPT | Performed by: PHYSICIAN ASSISTANT

## 2019-09-23 PROCEDURE — 25000132 ZZH RX MED GY IP 250 OP 250 PS 637: Performed by: NURSE PRACTITIONER

## 2019-09-23 PROCEDURE — 25000132 ZZH RX MED GY IP 250 OP 250 PS 637: Performed by: SURGERY

## 2019-09-23 PROCEDURE — 25000132 ZZH RX MED GY IP 250 OP 250 PS 637: Performed by: TRANSPLANT SURGERY

## 2019-09-23 PROCEDURE — 84100 ASSAY OF PHOSPHORUS: CPT | Performed by: TRANSPLANT SURGERY

## 2019-09-23 RX ORDER — DAPSONE 100 MG/1
100 TABLET ORAL DAILY
Status: DISCONTINUED | OUTPATIENT
Start: 2019-09-24 | End: 2019-10-01 | Stop reason: HOSPADM

## 2019-09-23 RX ORDER — ONDANSETRON 4 MG/1
4 TABLET, ORALLY DISINTEGRATING ORAL EVERY 6 HOURS PRN
Status: DISCONTINUED | OUTPATIENT
Start: 2019-09-23 | End: 2019-10-01 | Stop reason: HOSPADM

## 2019-09-23 RX ORDER — AMINO AC/PROTEIN HYDR/WHEY PRO 10G-100/30
1 LIQUID (ML) ORAL DAILY
Status: ON HOLD | DISCHARGE
Start: 2019-09-24 | End: 2019-09-30

## 2019-09-23 RX ORDER — ASCORBIC ACID 500 MG
500 TABLET ORAL DAILY
Status: DISCONTINUED | OUTPATIENT
Start: 2019-09-24 | End: 2019-10-01 | Stop reason: HOSPADM

## 2019-09-23 RX ORDER — LANOLIN ALCOHOL/MO/W.PET/CERES
3 CREAM (GRAM) TOPICAL AT BEDTIME
Status: DISCONTINUED | OUTPATIENT
Start: 2019-09-23 | End: 2019-10-01 | Stop reason: HOSPADM

## 2019-09-23 RX ORDER — OXYMETAZOLINE HYDROCHLORIDE 0.05 G/100ML
2 SPRAY NASAL 2 TIMES DAILY PRN
Status: DISCONTINUED | OUTPATIENT
Start: 2019-09-23 | End: 2019-10-01 | Stop reason: HOSPADM

## 2019-09-23 RX ORDER — POLYETHYLENE GLYCOL 3350 17 G/17G
17 POWDER, FOR SOLUTION ORAL DAILY PRN
Status: DISCONTINUED | OUTPATIENT
Start: 2019-09-23 | End: 2019-10-01 | Stop reason: HOSPADM

## 2019-09-23 RX ORDER — FAMOTIDINE 20 MG/1
20 TABLET, FILM COATED ORAL DAILY
Status: DISCONTINUED | OUTPATIENT
Start: 2019-09-24 | End: 2019-10-01 | Stop reason: HOSPADM

## 2019-09-23 RX ORDER — VANCOMYCIN HYDROCHLORIDE 50 MG/ML
125 KIT ORAL 4 TIMES DAILY
Status: COMPLETED | OUTPATIENT
Start: 2019-09-23 | End: 2019-09-26

## 2019-09-23 RX ORDER — ONDANSETRON 4 MG/1
4 TABLET, ORALLY DISINTEGRATING ORAL
Status: DISCONTINUED | OUTPATIENT
Start: 2019-09-24 | End: 2019-10-01 | Stop reason: HOSPADM

## 2019-09-23 RX ORDER — ASPIRIN 325 MG
325 TABLET ORAL DAILY
Status: DISCONTINUED | OUTPATIENT
Start: 2019-09-24 | End: 2019-10-01 | Stop reason: HOSPADM

## 2019-09-23 RX ORDER — FAMOTIDINE 20 MG/1
20 TABLET, FILM COATED ORAL DAILY
Status: ON HOLD | DISCHARGE
Start: 2019-09-23 | End: 2019-09-30

## 2019-09-23 RX ORDER — AMOXICILLIN 250 MG
2 CAPSULE ORAL 2 TIMES DAILY PRN
Status: DISCONTINUED | OUTPATIENT
Start: 2019-09-23 | End: 2019-10-01 | Stop reason: HOSPADM

## 2019-09-23 RX ORDER — MYCOPHENOLIC ACID 360 MG/1
720 TABLET, DELAYED RELEASE ORAL
Status: DISCONTINUED | OUTPATIENT
Start: 2019-09-23 | End: 2019-09-23 | Stop reason: HOSPADM

## 2019-09-23 RX ORDER — MYCOPHENOLIC ACID 360 MG/1
720 TABLET, DELAYED RELEASE ORAL 2 TIMES DAILY
Status: DISCONTINUED | OUTPATIENT
Start: 2019-09-24 | End: 2019-10-01 | Stop reason: HOSPADM

## 2019-09-23 RX ORDER — HEPARIN SODIUM 5000 [USP'U]/.5ML
5000 INJECTION, SOLUTION INTRAVENOUS; SUBCUTANEOUS EVERY 8 HOURS
Status: DISCONTINUED | OUTPATIENT
Start: 2019-09-23 | End: 2019-09-30

## 2019-09-23 RX ORDER — AMINO AC/PROTEIN HYDR/WHEY PRO 10G-100/30
1 LIQUID (ML) ORAL DAILY
Status: DISCONTINUED | OUTPATIENT
Start: 2019-09-24 | End: 2019-09-26

## 2019-09-23 RX ORDER — CYCLOSPORINE 100 MG/ML
225 SOLUTION ORAL 2 TIMES DAILY
Status: DISCONTINUED | OUTPATIENT
Start: 2019-09-23 | End: 2019-09-26

## 2019-09-23 RX ORDER — MYCOPHENOLIC ACID 360 MG/1
720 TABLET, DELAYED RELEASE ORAL 2 TIMES DAILY
Status: DISCONTINUED | OUTPATIENT
Start: 2019-09-23 | End: 2019-09-23

## 2019-09-23 RX ORDER — MAGNESIUM OXIDE 400 MG/1
400 TABLET ORAL DAILY
Status: DISCONTINUED | OUTPATIENT
Start: 2019-09-24 | End: 2019-10-01 | Stop reason: HOSPADM

## 2019-09-23 RX ORDER — VALGANCICLOVIR 450 MG/1
450 TABLET, FILM COATED ORAL DAILY
Status: DISCONTINUED | OUTPATIENT
Start: 2019-09-24 | End: 2019-10-01 | Stop reason: HOSPADM

## 2019-09-23 RX ORDER — MYCOPHENOLIC ACID 360 MG/1
720 TABLET, DELAYED RELEASE ORAL 2 TIMES DAILY
Status: ON HOLD | DISCHARGE
Start: 2019-09-23 | End: 2019-09-30

## 2019-09-23 RX ORDER — ALUMINA, MAGNESIA, AND SIMETHICONE 2400; 2400; 240 MG/30ML; MG/30ML; MG/30ML
15 SUSPENSION ORAL EVERY 6 HOURS PRN
Status: DISCONTINUED | OUTPATIENT
Start: 2019-09-23 | End: 2019-09-27

## 2019-09-23 RX ORDER — ZINC SULFATE 50(220)MG
220 CAPSULE ORAL DAILY
Status: DISCONTINUED | OUTPATIENT
Start: 2019-09-24 | End: 2019-10-01 | Stop reason: HOSPADM

## 2019-09-23 RX ORDER — ACETAMINOPHEN 325 MG/1
325 TABLET ORAL EVERY 8 HOURS PRN
Status: DISCONTINUED | OUTPATIENT
Start: 2019-09-23 | End: 2019-10-01 | Stop reason: HOSPADM

## 2019-09-23 RX ADMIN — CYCLOSPORINE 225 MG: 100 SOLUTION ORAL at 22:59

## 2019-09-23 RX ADMIN — VALGANCICLOVIR 450 MG: 450 TABLET, FILM COATED ORAL at 08:42

## 2019-09-23 RX ADMIN — Medication 5000 UNITS: at 04:33

## 2019-09-23 RX ADMIN — Medication 1 PACKET: at 08:36

## 2019-09-23 RX ADMIN — MAGNESIUM OXIDE TAB 400 MG (241.3 MG ELEMENTAL MG) 400 MG: 400 (241.3 MG) TAB at 11:52

## 2019-09-23 RX ADMIN — LEVOTHYROXINE SODIUM 175 MCG: 175 TABLET ORAL at 08:42

## 2019-09-23 RX ADMIN — VANCOMYCIN HYDROCHLORIDE 125 MG: KIT at 08:36

## 2019-09-23 RX ADMIN — VANCOMYCIN HYDROCHLORIDE 125 MG: KIT at 22:59

## 2019-09-23 RX ADMIN — ONDANSETRON 4 MG: 4 TABLET, ORALLY DISINTEGRATING ORAL at 08:34

## 2019-09-23 RX ADMIN — DAPSONE 100 MG: 25 TABLET ORAL at 08:42

## 2019-09-23 RX ADMIN — SALINE NASAL SPRAY 1 SPRAY: 1.5 SOLUTION NASAL at 04:33

## 2019-09-23 RX ADMIN — Medication 5000 UNITS: at 13:08

## 2019-09-23 RX ADMIN — MULTIVITAMIN 15 ML: LIQUID ORAL at 08:36

## 2019-09-23 RX ADMIN — OXYCODONE HYDROCHLORIDE AND ACETAMINOPHEN 500 MG: 500 TABLET ORAL at 08:42

## 2019-09-23 RX ADMIN — MYCOPHENOLIC ACID 540 MG: 360 TABLET, DELAYED RELEASE ORAL at 08:42

## 2019-09-23 RX ADMIN — MELATONIN 1000 UNITS: at 22:58

## 2019-09-23 RX ADMIN — VANCOMYCIN HYDROCHLORIDE 125 MG: KIT at 11:52

## 2019-09-23 RX ADMIN — CYCLOSPORINE 225 MG: 100 SOLUTION ORAL at 08:36

## 2019-09-23 RX ADMIN — MELATONIN TAB 3 MG 3 MG: 3 TAB at 22:58

## 2019-09-23 RX ADMIN — ZINC SULFATE CAP 220 MG (50 MG ELEMENTAL ZN) 220 MG: 220 (50 ZN) CAP at 08:42

## 2019-09-23 RX ADMIN — CLOTRIMAZOLE 1 TROCHE: 10 LOZENGE ORAL; TOPICAL at 20:00

## 2019-09-23 RX ADMIN — CLOTRIMAZOLE 1 TROCHE: 10 LOZENGE ORAL at 10:17

## 2019-09-23 RX ADMIN — VANCOMYCIN HYDROCHLORIDE 125 MG: KIT at 18:49

## 2019-09-23 RX ADMIN — MYCOPHENOLIC ACID 540 MG: 360 TABLET, DELAYED RELEASE ORAL at 19:55

## 2019-09-23 RX ADMIN — ASPIRIN 325 MG ORAL TABLET 325 MG: 325 PILL ORAL at 08:42

## 2019-09-23 RX ADMIN — FAMOTIDINE 20 MG: 20 TABLET ORAL at 08:42

## 2019-09-23 RX ADMIN — CLOTRIMAZOLE 1 TROCHE: 10 LOZENGE ORAL at 13:08

## 2019-09-23 RX ADMIN — MELATONIN 1000 UNITS: at 08:42

## 2019-09-23 RX ADMIN — HEPARIN SODIUM 5000 UNITS: 5000 INJECTION, SOLUTION INTRAVENOUS; SUBCUTANEOUS at 22:55

## 2019-09-23 ASSESSMENT — ACTIVITIES OF DAILY LIVING (ADL)
DRESS: 2-->ASSISTIVE PERSON
RETIRED_COMMUNICATION: 0-->UNDERSTANDS/COMMUNICATES WITHOUT DIFFICULTY
TOILETING: 0-->INDEPENDENT
COGNITION: 0 - NO COGNITION ISSUES REPORTED
BATHING: 0-->INDEPENDENT
ADLS_ACUITY_SCORE: 22
RETIRED_EATING: 0-->INDEPENDENT
ADLS_ACUITY_SCORE: 22
ADLS_ACUITY_SCORE: 23
ADLS_ACUITY_SCORE: 21
SWALLOWING: 0-->SWALLOWS FOODS/LIQUIDS WITHOUT DIFFICULTY
TRANSFERRING: 0-->INDEPENDENT
AMBULATION: 0-->INDEPENDENT
WHICH_OF_THE_ABOVE_FUNCTIONAL_RISKS_HAD_A_RECENT_ONSET_OR_CHANGE?: AMBULATION;TRANSFERRING;TOILETING;BATHING;DRESSING

## 2019-09-23 ASSESSMENT — MIFFLIN-ST. JEOR: SCORE: 1411.4

## 2019-09-23 NOTE — PROGRESS NOTES
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      Graft function: DD OLT 8/18/19 without stent-POD #36  Transaminases, AP, Tbili stable.     Immunosuppression management:   Tac - stopped due to prolonged delirium  CSA started 9/2.  mg BID to titrate to a goal of 200-300. Level 9/23 282 (11.5 hour trough). No change.  MMF increased to 1000 mg BID during transition of CNI. Due to nausea, changed to Myfortic 540 mg BID.   Pred 5 mg when transitioning IS meds, now discontinued.   Complexity of management: Medium. Contributing factors: anemia and induction encephalopathy.     Hematology: Acute blood loss anemia. Transfusion goal hgb > 7. Last transfused  1 unit 9/17.     HEENT: Epistaxis: due to friable mucosa, managed with packing. NJ tube with wound ulceration to Left nare.  Consult WOC.    Cardiorespiratory: Patient extubated 8/24. RA. Encourage IS and ambulation.     Neuro:   Toxic and metabolic encephalopathy: likely secondary to poor sleep, drugs (tac, pain medication), acute illness. Ordered melatonin to help patient sleep, patient's mental status has seemed to be improving overall.  -MRI head 9/3- results unremarkable. Discontinued Tacrolimus. Consulted neurology, recommended LP to rule out infectious etiology and Video EEG monitoring completed, no evidence of seizures.    9/5 LP, results thus far negative for infection. WBC 1. Protein 44 and glucose 51. Negative CSF studies: cryptococcal, enterovirus, HSV, VZV.    -Continue scheduled melatonin at bedtime.   -discontinued sitter 9/17 AM as patient is A&O, demonstrated nursing call button and is not pulling at FT     GI/Nutrition: 3gm K diet, NJ in place. Continue EN- Nepro @65mL/hr,.decrease from 16 hours to 14. Continue po nepro supplemental shakes.  Continue calorie counts.   Diarrhea- C diff positive 9/17, started PO Vanco x 10 days    Endocrine:   Steroid induced hyperglycemia- resolved FBS     Fluid/Electrolytes:   GAMAL- resolved, CRRT stopped and patient has required no further HD. UOP 1.5L. SCr now normalized. SCr 0.8  Metabolic alkalosis, with compensated respiratory acidosis: CO2  31. Nephrology following, avoiding loop diuretics  Hyperkalemia: K 4.7-improved after transitioning to Nepro EN.    : Incontinent resolved with improving mentation.    Infectious disease: AF, WBC 5  UA: moderate LE, 3 epis. UC with mixed edith.   Ppx with micafungin x 10 days completed, zosyn completed.   Clostridium Difficile-+9/17-9/27.  PPx with dapsone and valcyte.    Prophylaxis: Heparin subcutaneous TID.     Activity: OOB to chair today. PT/OT.     Disposition: 7A, ready to discharge to ARU on 9/17, awaiting dispo, likely today, 9/23    Medical Decision Making: Medium  Subsequent visit 46956 (moderate level decision making)    VEENA/Fellow/Resident Provider: Yvette Reich NP      Faculty: Pushpa Stahl MD  ____________________________________________________________  Transplant History: Admitted 8/16/2019 for acute decompensated ANGULO.   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    8/18/2019 (Liver), Postoperative day: 36     Interval History:   Mental status improved, attempting to take more in po. Having difficulty due to EN, nausea. Generalized abdominal distention/discomfort. +stooling, denies diarrhea    ROS:   A 10-point review of systems was negative except as noted above.    Curent Meds:    aspirin  325 mg Oral Daily     clotrimazole  1 Molly Buccal 4x Daily     cycloSPORINE modified  225 mg Oral BID IS     dapsone  100 mg Oral Daily     famotidine  20 mg Oral Daily     heparin  5,000 Units Subcutaneous Q8H     levothyroxine  175 mcg Oral Daily     magnesium oxide  400 mg Oral Daily     melatonin  3 mg Oral At  Bedtime     multivitamins w/minerals  15 mL Per Feeding Tube Daily     mycophenolic acid  540 mg Oral BID IS     ondansetron  4 mg Oral QAM AC     protein modular  1 packet Per Feeding Tube Daily     sodium chloride  1 spray Both Nostrils Q4H     sodium chloride (PF)  3 mL Intravenous Q8H     sodium chloride (PF)  3 mL Intracatheter Q8H     valGANciclovir  450 mg Oral Daily     vancomycin  125 mg Oral 4x Daily     vitamin C  500 mg Oral Daily     vitamin D3  1,000 Units Oral BID     zinc sulfate  220 mg Oral Daily       Physical Exam:     Admit Weight: 104.3 kg (230 lb)    Current Vitals:   BP 91/47 (BP Location: Left arm)   Pulse 85   Temp 98.3  F (36.8  C) (Oral)   Resp 16   Wt 88.3 kg (194 lb 9.6 oz)   SpO2 97%   BMI 35.59 kg/m      Vital sign ranges:    Temp:  [97.3  F (36.3  C)-98.5  F (36.9  C)] 98.3  F (36.8  C)  Heart Rate:  [80-90] 82  Resp:  [14-16] 16  BP: ()/(47-68) 91/47  SpO2:  [91 %-97 %] 97 %  Patient Vitals for the past 24 hrs:   BP Temp Temp src Heart Rate Resp SpO2 Weight   09/23/19 0721 91/47 98.3  F (36.8  C) Oral 82 16 97 % --   09/23/19 0335 108/65 97.3  F (36.3  C) Axillary 82 16 94 % --   09/23/19 0056 -- -- -- -- -- -- 88.3 kg (194 lb 9.6 oz)   09/22/19 2346 98/68 98.5  F (36.9  C) Oral 90 16 92 % --   09/22/19 1923 112/64 97.5  F (36.4  C) Oral 82 16 93 % --   09/22/19 1656 97/53 97.6  F (36.4  C) Oral 82 16 91 % --   09/22/19 1221 105/61 98.4  F (36.9  C) Oral 80 14 97 % --     General Appearance: NAD  HEENT: Feeding tube in place with EN infusing  Skin: pale throughout with scattered ecchymosis, warm, dry  Heart: RRR  Lungs: NLB on RA  Abdomen: soft, nondistended, dull to percussion, incision c/d/i.   : Wearing depends, incontinent at times  Extremities: edema: present bilaterally. 1+.  Neurologic: alert, oriented x3.     Data:   CMP  Recent Labs   Lab 09/23/19  0602 09/22/19  0711    138   POTASSIUM 4.7 4.8   CHLORIDE 103 102   CO2 31 31   * 96   BUN 36* 35*    CR 0.78 0.76   GFRESTIMATED 83 86   GFRESTBLACK >90 >90   JACOB 9.3 9.2   MAG 1.7 1.8   PHOS 5.0* 4.4   ALBUMIN 2.3* 2.3*   BILITOTAL 1.0 1.2   ALKPHOS 230* 216*   AST 52* 48*   ALT 57* 50     CBC  Recent Labs   Lab 09/23/19  0602 09/22/19  0711   HGB 7.9* 7.7*   WBC 4.9 4.8    207     COAGS  No lab results found in last 7 days.    Invalid input(s): XA

## 2019-09-23 NOTE — PLAN OF CARE
/68 (BP Location: Left arm)   Pulse 85   Temp 98.6  F (37  C) (Oral)   Resp 16   Wt 88.3 kg (194 lb 9.6 oz)   SpO2 95%   BMI 35.59 kg/m      9844-0251: Patient VSS on RA, afebrile. No c/o nausea, pain, or SOB. Tolerating 3g K diet with fair appetite. NJ clamped; Nepro @ 65ml/h (30ml flushes q4h) cycled from 6pm-8am. Voiding adequately, LBM 9/22. Incision approximated/scabbing. Wound on septum covered with clear dressing. Up with assist x1 with walker/GB. Lab book and med card updated, given to  (Hugo). Report given to ED Andrade from ARU @ 1330. Left unit with transport for ARU @ 1400.  Will continue with POC and notify MD with changes or concerns.

## 2019-09-23 NOTE — H&P
"    General acute hospital   Acute Rehabilitation Unit  Admission History and Physical    CHIEF COMPLAINT   \"strengthen my muscles\"    HISTORY OF PRESENT ILLNESS  Nicci Pemberton is a 59 year old woman with a history of end stage liver disease secondary to ANGULO cirrhosis complicated by spontaneous bacterial peritonitis who presented in acute decompensated liver failure on 2019.  Underwent  donor liver transplant 2019.  Post operative course complicated by toxic and metabolic encephalopathy, C-Diff diarrhea, hyperkalemia, steroid induced hyperglycemia,  impaired intake, nausea, epistaxis,  acute blood loss anemia, and debility.      During his acute hospitalization, patient was seen and evaluated by PT and OT, who collectively recommended that patient would benefit from ongoing therapies in the acute inpatient rehabilitation setting.       In review of the therapy notes noted to have impaired strength, activity tolerance, and cognition.  She is currently transferring to bathroom with FWW and CGA/SBA.  Completed standing ADLs with SBA at sink. She is completing bed mobility and supine to sit transfers with SBA.  Completing sit to stand with CGA with use of FWW.  Ambulating up to 200 feet with FWW with CGA and need for prolonged rest breaks.     On admission to ARU Nicci was seen with her  she is alert and fatigued.  Reports ongoing abdominal pressure and pain, feelings of fullness and poor appetite.  Expresses frustrations with chronicity of these problems and acknowledges goal to eat though no interest in food.  Denies n/v, urinary concerns, fevers, dizziness.  Has mild intermittent headaches, interrupted sleep, and feelings of sob at times.  Denies any new concerns.  Has chronic L>R knee pain which can be limiting at times.  Goals to get home with ongoing assist as needed from .       PAST MEDICAL HISTORY   Reviewed and updated in Epic.  Past Medical History: "   Diagnosis Date     Anemia      Cellulitis      Cirrhosis of liver (H) 2018     Heterozygous alpha 1-antitrypsin deficiency (H)      High hepatic iron concentration determined by biopsy of liver      Liver cirrhosis secondary to ANGULO (H)      Liver transplanted (H) 2019     Lymphedema      Osteoarthritis      SBP (spontaneous bacterial peritonitis) (H) 2019 in        SURGICAL HISTORY  Reviewed and updated in Epic.  Past Surgical History:   Procedure Laterality Date     BENCH LIVER N/A 2019    Procedure: BACKBENCH PREPARATION, LIVER;  Surgeon: Madneep Alford MD;  Location: [facility] OR     COLONOSCOPY N/A 2018    Procedure: COLONOSCOPY;  colonoscopy;  Surgeon: Hugo Patel MD;  Location: [facility] OR     IR FEEDING TUBE PLACEMENT W FLUORO/MD  2019     IR FLUORO 0-1 HOUR  2019     IR LUMBAR PUNCTURE  2019     TRANSPLANT LIVER RECIPIENT  DONOR N/A 2019    Procedure: TRANSPLANT, LIVER, RECIPIENT,  DONOR;  Surgeon: Mandeep Alford MD;  Location: [facility] OR       SOCIAL HISTORY  Reviewed and updated in Epic.  Marital Status:   Living situation: lives in [facility] [address] 9 inside  Family support: supportive   Vocational History: retired accounting/hr   Tobacco use: distant use  Alcohol use: none >3 years  Illicit drug use: none  Social History     Socioeconomic History     Marital status:      Spouse name: Not on file     Number of children: Not on file     Years of education: Not on file     Highest education level: Not on file   Occupational History     Not on file   Social Needs     Financial resource strain: Not on file     Food insecurity:     Worry: Not on file     Inability: Not on file     Transportation needs:     Medical: Not on file     Non-medical: Not on file   Tobacco Use     Smoking status: Former Smoker     Last attempt to quit:      Years since quittin.7     Smokeless tobacco: Never Used     Tobacco  comment: weekend smoker    Substance and Sexual Activity     Alcohol use: No     Drug use: No     Sexual activity: Not on file   Lifestyle     Physical activity:     Days per week: Not on file     Minutes per session: Not on file     Stress: Not on file   Relationships     Social connections:     Talks on phone: Not on file     Gets together: Not on file     Attends [employer] service: Not on file     Active member of club or organization: Not on file     Attends meetings of clubs or organizations: Not on file     Relationship status: Not on file     Intimate partner violence:     Fear of current or ex partner: Not on file     Emotionally abused: Not on file     Physically abused: Not on file     Forced sexual activity: Not on file   Other Topics Concern     Parent/sibling w/ CABG, MI or angioplasty before 65F 55M? Not Asked   Social History Narrative     Not on file       FAMILY HISTORY  Reviewed and updated in Epic.  Family History   Problem Relation Age of Onset     Cirrhosis No family hx of      Liver Cancer No family hx of          PRIOR FUNCTIONAL HISTORY   Pt was independent with all ADLs/IADLs, transfers, mobility and gait.      MEDICATIONS  Scheduled meds  Medications Prior to Admission   Medication Sig Dispense Refill Last Dose     Cholecalciferol (VITAMIN D-3) 1000 units CAPS Take 1,000 Units by mouth 2 times daily   8/16/2019x1 at Unknown time     [DISCONTINUED] bumetanide (BUMEX) 1 MG tablet Take 1 tablet (1 mg) by mouth daily 90 tablet 1 8/16/2019 at Unknown time     [DISCONTINUED] ciprofloxacin (CIPRO) 500 MG tablet Take 500 mg by mouth daily    8/16/2019x1 at Unknown time     [DISCONTINUED] famotidine (PEPCID) 20 MG tablet Take 20 mg by mouth 2 times daily    8/16/2019x1 at Unknown time     [DISCONTINUED] lactulose 20 GM/30ML SOLN Take 30 mLs by mouth 3 times daily   8/16/2019x1 at Unknown time     [DISCONTINUED] levothyroxine (SYNTHROID/LEVOTHROID) 125 MCG tablet Take 125 mcg by mouth daily    "8/16/2019 at Unknown time     [DISCONTINUED] magnesium oxide (MAG-OX) 400 MG tablet Take 400 mg by mouth daily    8/16/2019 at Unknown time     [DISCONTINUED] oxyCODONE HCl (OXAYDO) 5 MG TABA Take 5 mg by mouth every 8 hours as needed   Past Month at Unknown time     [DISCONTINUED] rifaximin (XIFAXAN) 550 MG TABS tablet Take 1 tablet (550 mg) by mouth 2 times daily 60 tablet 11 8/16/2019x1 at Unknown time     [DISCONTINUED] spironolactone (ALDACTONE) 100 MG tablet Take 100 mg by mouth 2 times daily   8/16/2019x1 at Unknown time       ALLERGIES     Allergies   Allergen Reactions     Food      Fruits with cores and pits  Apples     Sulfa Drugs Unknown     Swollen joints     Furosemide Rash         REVIEW OF SYSTEMS  A 10 point ROS was performed and negative unless otherwise noted in HPI.       PHYSICAL EXAM  VITAL SIGNS:  There were no vitals taken for this visit.  BMI:  Estimated body mass index is 35.59 kg/m  as calculated from the following:    Height as of 4/18/19: 1.575 m (5' 2\").    Weight as of an earlier encounter on 9/23/19: 88.3 kg (194 lb 9.6 oz).     General: awake fatigued  HEENT: nasal feeding tube present mucus membranes dry  Pulmonary: non labored clear on room air  Cardiovascular: rrr  Abdominal: soft incision well healed CDI, reported pain  Extremities: warm, well perfused, mild edema in bilateral lower extremities (wraps in place) , no tenderness in calves   MSK/neuro:   Mental Status:  alert and oriented  Cranial Nerves: grossly normal      Sensory: Normal to light touch in bilateral upper and lower extremities    Strength: 4/5 in all muscle groups of the upper and lower extremities some limited ROM at hips and knees (L>R due to knee pain)   Abnormal movements: mild intention tremor   Coordination: No dysmetria on finger to nose    Speech: clear coherent   Cognition: some delayed processing evident   Gait: not tested  Skin: abdominal incision CDI well healed      LABS  Lab Results   Component Value " Date    WBC 4.9 09/23/2019     Lab Results   Component Value Date    RBC 2.52 09/23/2019     Lab Results   Component Value Date    HGB 7.9 09/23/2019     Lab Results   Component Value Date    HCT 26.5 09/23/2019     Lab Results   Component Value Date     09/23/2019     Lab Results   Component Value Date    MCH 31.3 09/23/2019     Lab Results   Component Value Date    MCHC 29.8 09/23/2019     Lab Results   Component Value Date    RDW 19.3 09/23/2019     Lab Results   Component Value Date     09/23/2019     Last Comprehensive Metabolic Panel:  Sodium   Date Value Ref Range Status   09/23/2019 138 133 - 144 mmol/L Final     Potassium   Date Value Ref Range Status   09/23/2019 4.7 3.4 - 5.3 mmol/L Final     Chloride   Date Value Ref Range Status   09/23/2019 103 94 - 109 mmol/L Final     Carbon Dioxide   Date Value Ref Range Status   09/23/2019 31 20 - 32 mmol/L Final     Anion Gap   Date Value Ref Range Status   09/23/2019 4 3 - 14 mmol/L Final     Glucose   Date Value Ref Range Status   09/23/2019 104 (H) 70 - 99 mg/dL Final     Urea Nitrogen   Date Value Ref Range Status   09/23/2019 36 (H) 7 - 30 mg/dL Final     Creatinine   Date Value Ref Range Status   09/23/2019 0.78 0.52 - 1.04 mg/dL Final     GFR Estimate   Date Value Ref Range Status   09/23/2019 83 >60 mL/min/[1.73_m2] Final     Comment:     Non  GFR Calc  Starting 12/18/2018, serum creatinine based estimated GFR (eGFR) will be   calculated using the Chronic Kidney Disease Epidemiology Collaboration   (CKD-EPI) equation.       Calcium   Date Value Ref Range Status   09/23/2019 9.3 8.5 - 10.1 mg/dL Final         IMPRESSION/PLAN:  Nicci Pemberton is a 59 year old woman with a past medical history of HTN, hypothyroidism, and end stage liver disease secondary to ANGULO and heterozygous alpha-1 antitrypsin deficiency admitted to [facility] 8/16 in decompensated liver failure underwent liver transplant 8/18, [facility]  [facility]habilitation and medical management 2019.     Admission to acute inpatient rehab debility s/p liver transplant  Impairment group code: 16      1. PT, OT and SLP 60 minutes of each on a daily basis, in addition to rehab nursing and close management of physiatrist.  Noted to have impaired strength, activity tolerance, and cognition.    2. Impairment of ADL's:   She is currently completing dressing with partial/mod assist, grooming with SBA.     3. Impairment of mobility:  Currently completing rolling with SBA, completing sit to stand transfers with min assist, ambulating with CGA up to 200 feet.       4. Impairment of cognition/language/swallow:  Will benefit from full cognitive evaluation.     5. Medical Conditions  ESLD 2/2 ANGULO & heterozygous alpha-1 antitrypsin deficiency s/p  donor liver transplant .  -appreciate ongoing assistance per transplant and hospitalist service  -no lifting >10 pounds  -transplant coordinator: Zina [phone]  F/u transplant surgeon, transplant hepatology  - CBC, CMP, mag, phos, and CSA levels (12 hours after administration)) to be drawn every Monday and Thursday   -Immunosuppression: myfortic, cyclosporine (goal 200-300)  -Prophylaxis: dapsone x 6 months, valacyclovir x 12 weeks  -ongoing ab pain- monitor    Toxic and metabolic encephalopathy- multifactorial: meds, sleep, illness, MRI brain 9/3 neg.  Evaluated by neurology  with workup. .  -continue to monitor  -SLP consult for cog eval  -delirium precautions ordered    C-Diff- +   -continue po vanco 10 days    Acute blood loss anemia- with anemia of chronic disease.  Last transfused .  Hgb 7.9 .  -trend Mon/Thurs    Severe Malnutrition - acute on chronic illness s/p ND  per IR.  Goal to taper off and discontinue TF prior to ARU discharge.   -TF 6p-6 a via ND ( on Nepro due to previous hyperkalemia)  -FW Q 4 hours  -appreciate nutrition consult  -marcie counts    Medical Device Related Pressure  Injury (MDRPI) due to Bridle string  Pressure Injury  Stage 2   -wound care as ordered    Hypothyroidism- continue synthroid    6. Adjustment to disability:  Clinical psychology to eval and treat  7. FEN: reg + TF  8. Bowel: continent  9. Bladder: continent- with reported chronic leaking  10. DVT Prophylaxis: mechanical  11. GI Prophylaxis: PPI  12. Code: full confirmed with patient and  on admission  13. Disposition: goal for home  14. ELOS:  ~7 days  15. Rehab prognosis:  Expected ongoing improvement in strength, activity tolerance and cognition  16. Follow up Appointments on Discharge: hepatology, transplant surgery        discussed with Dr. Kelley , PM&R staff physician     Mary Alcantara PA-C  Rehab Service  Pager [phone]

## 2019-09-23 NOTE — PLAN OF CARE
[id number]:  VS:  Afebrile, HR 82, BP 91/47 (BPs have been running soft, team aware), O2 sat 97% with RA.  LABS:  LFTs trending into normal range.    NEURO:  Alert, oriented x4; occasionally forgetful but easily redirected.  Tearful this AM after her  arrived at bedside - pt verbalized anxiety about transferring to ARU.  Stated that she is nervous about going to a facility that is new to her.  PAIN:  Reported adbominal discomfort during AM meal, declined papin medications offered.  Continue to assess pain and offer prn medications.  DIET:  TF cycled off at 1000.  Taking small amounts of breakfast with encouragement (3 gram K+ diet).  Drinking supplemental drink with AM meds.  GI:  Bowel sounds active, no BM thus far this AM.  :  Void x1 this AM.    SKIN:  Remains slightly jaundiced.  Transplant incision approximated.  Unable to complete full skin assessment as pt sitting up in chair for meal and visits with SW, WOC, and Liver team.  ACTIVITY:  Needed and received assist of 1 with walker for transfers.  Sitting up in recliner.  Encouraged to use call light to alert staff of need to get up to bathroom.  LINE:  PIV in RUE flushed, remains saline locked.  EDUCATION:  Updated Med Card and given to pt's .  Obtained brochure on [facility] and gave to pt's .  SW came to see pt and  to provide information and answer questions about care at ARU after transfer.  Also met with WOC and Dietician.  PLAN:  Continue current plan of care.  Update Liver team prn any change in pt status or other concerns.   has started to pack belongings for transfer.  Ride set up for 1400 today, bed availability still pending.

## 2019-09-23 NOTE — PROGRESS NOTES
[facility] Nurse Inpatient Pressure Injury Assessment   Reason for consultation: Evaluate and treat nose      ASSESSMENT  Pressure Injury: on Left nare extending to columella, hospital acquired,   This is a Medical Device Related Pressure Injury (MDRPI) due to Bridle string  Pressure Injury is Stage 2   Contributing factor of the pressure injury: pressure  Status: initial assessment  Recommend provider order: NA     TREATMENT PLAN  Bilateral nares and across septum wound: Every shift check to ensure dressing in place and that tube is NOT taped to face. Daily: change strip of comfeel with strips extending into bilateral nares across septum. For eating please tuck tube behind ear.    Orders Written  WO Nurse follow-up plan:twice weekly  Nursing to notify the Provider(s) and re-consult the WO Nurse if wound(s) deteriorates or new skin concern.    Patient History  According to provider note(s):  Nicci Pemberton is a 60 yo female with a past medical history significant for end stage liver disease 2/2 ANGULO and alpha 1 anti-trypsin deficiency now s/p DD OLT on 8/18/19.     Objective Data  Containment of urine/stool: Continent of bowel and Continent of bladder    Current Diet/ Nutrition:  Orders Placed This Encounter      3 Gram K Diet      Advance Diet as Tolerated      Output:   I/O last 3 completed shifts:  In: 2700 [P.O.:1390; NG/GT:270]  Out: 1825 [Urine:1825]    Risk Assessment:   Sensory Perception: 4-->no impairment  Moisture: 4-->rarely moist  Activity: 3-->walks occasionally  Mobility: 3-->slightly limited  Nutrition: 3-->adequate  Friction and Shear: 2-->potential problem  Umair Score: 19      Labs:   Recent Labs   Lab 09/23/19  0602   ALBUMIN 2.3*   HGB 7.9*   WBC 4.9       Physical Exam  Skin inspection: focused nose    Wound Location:  Columella and left nare         Left nostril           Columella    Date of last Photo 9/19/19  Wound History: Per Dietitian Criss Iglesias wound found this morning with bridle tight,  twisted, imbedded in left nare so it was not visible, and tegaderm taped to right cheek. Dietitian then removed tape, untwisted bridle, remove from nasal mucosa, and loosen bridle. Per RN Aida Guevara patient has had four different NJs placed since 9/6, and goal is for patient to eat enough to be able to remove, however patient very tired today. Patient was unable to report how long nose has been hurting. Writer had cally DE CWOCN also assess wound.  9/23: Improved appearance of wound.  Noted tube taped to her left cheek so she can eat.  This pulled tube against her R nare with no injury at this time.  Will add to POC to allow pt to tuck tube behind her ear.    Measurements (length x width x depth, in cm) 0.3 cm x 1.1 cm  x  0.1 cm   Wound Base:  20% pink 0% white 60% mucosal tissue depth  Tunneling N/A  Undermining N/A  Palpation of the wound bed: normal   Periwound skin: intact  Color: normal and consistent with surrounding tissue  Temperature: normal   Drainage:, small  Description of drainage: serosanguinous  Odor: none  Pain: during dressing change, sharp    Interventions  Current support surface: Standard  Low air loss mattress - nurse assessing if can be changed to atmos air  Current off-loading measures: Pillows under calves  Repositioning aid: Pillows  Visual inspection of wound(s) completed   Tube Securement: bridle  Wound Care: was done per plan of care.  Supplies: gathered and placed at the bedside  Educated provided: plan of care and wound progress  Education provided to: patient , nurse and dietitian  Discussed importance of:off-loading pressure to wound  Discussed plan of care with Patient, RN, dietitian, and nurse manager     Calista Cruz RN, CWOCN

## 2019-09-23 NOTE — PLAN OF CARE
Occupational Therapy Discharge Summary    Reason for therapy discharge:    Discharged to acute rehabilitation facility.    Progress towards therapy goal(s). See goals on Care Plan in [facility] electronic health record for goal details.  Goals partially met.  Barriers to achieving goals:   discharge from facility.    Therapy recommendation(s):    Continued therapy is recommended.  Rationale/Recommendations:  to increase ADL/IADL IND and safety prior to return home.

## 2019-09-23 NOTE — PROGRESS NOTES
Transplant Social Work Service Discharge Note      Patient Name:  Nicci Pemberton     Anticipated Discharge Date:  9/23/19    Discharge Disposition:   ARU:  [facility]    Plan for 24 hour care for immediate post transplant period:  [facility] will provide    If not local, plans for short term stay: N/A    Additional Services/Equipment Arranged: [facility] wheelchair transport arranged for today at 2pm.     Persons notified of above discharge plan: patient, ,  nursing staff and [facility] admissions (Ceci), Zina Dunham-Liver Transplant Coordinator    Patient / Family response to discharge plan: in agreement    Education and resources provided by  at discharge: role of transplant  in out patient setting and provided contact info for , availability of support group    Discussed anticipated pharmacy out of pocket costs: YES    Provided [employer] Donor Letter Writing packet : NO, will be completed in [facility]    Plan: [facility] will transport patient to [facility] today at 2pm.        ARIEL Valerio, [facility]  Liver Transplant   Phone [phone]  Pager [phone]

## 2019-09-23 NOTE — PLAN OF CARE
/65 (BP Location: Right arm)   Pulse 85   Temp 97.3  F (36.3  C) (Axillary)   Resp 16   Wt 88.3 kg (194 lb 9.6 oz)   SpO2 94%   BMI 35.59 kg/m      1227-2044. AVSS on RA. Denies pain and nausea. PRN simethicone given x 1 for abdominal discomfort. Poor oral intake, 3g K + diet. NJ w/ TF @ 65 mL/hr. RPIV is SL. Voiding adequate amounts. Loose BM x 2. Up with A x 1 + walker to bathroom. Bed alarm audible, pt setting off frequently to use bathroom. Will continue to monitor and update with any changes.

## 2019-09-23 NOTE — PLAN OF CARE
AOX4, transferred from  to standing scale and bed with Mod A of 1 with gait belt. VSS, patient and spouse oriented to call light and room, admission checklist discussed with patient and spouse and they verbalize understanding. Patient denies pain. Nursing will continue with the admission process.

## 2019-09-23 NOTE — PLAN OF CARE
PT: Pt is a 58 y/o female who presents for PT evaluation following hospitalization where pt received liver transplant.  Pt lives in [place] with  [facility]o and has stair requirement both to enter and within home.    PT evaluation revealed decreased strength and endurance from reported baselines which contribute to poor activity tolerance, need for assist with mobility and use of AD. Pt will benefit from skilled PT in order to address these deficits, aide in appropriate discharge planning and reduce pt's risk of hospital readmission.   ELOS: 10 Days

## 2019-09-23 NOTE — PROGRESS NOTES
CLINICAL NUTRITION SERVICES - BRIEF NOTE     Nutrition Prescription    Recommendations already ordered by Registered Dietitian (RD):  Slightly reduce TF cycle:  Nepro @ 65 ml/hr x 14 hours (6p-8a) + 1 pkt Prosource TF  This meets ~90% lower end kcal/pro needs.     Continue Nepro berry flavor, BID    Continue calorie counts (9/24-9/26)    Future/Additional Recommendations:  Recommend patient consume ~1400 kcal, 70 grams protein (~75% needs) prior to discontinuing TF       EVALUATION OF THE PROGRESS TOWARD GOALS   Calorie counts(9/19-9/21) = 443 kcal, 20 grams protein (20-25% of assessed needs)    Nepro @ 65 ml/hr x 16 hours + 1 pkt Prosource TF daily  - 1912 kcal (32 kcal/kg), 95 grams protein (1.6g/kg) daily    Overall, improving appetite/supplement use.  Took 5 bites of eggs/mushrooms this morning.  She reports she feels nauseated.  Trying to get her pills down.  Tolerates rodriguez Nepro and is willing to continue drinking 1 of these daily.  Writer inspected her L nare wound which appears to be improving.       Monitoring/Evaluation  Progress toward goals will be monitored and evaluated per protocol.    Lela Ng MS, RD, LD  Pager [phone]

## 2019-09-23 NOTE — PLAN OF CARE
Physical Therapy Discharge Summary    Reason for therapy discharge:    Discharged to acute rehabilitation facility.    Progress towards therapy goal(s). See goals on Care Plan in [facility] electronic health record for goal details.  Goals partially met.  Barriers to achieving goals:   discharge from facility.    Therapy recommendation(s):    Continued therapy is recommended.  Rationale/Recommendations:  ARC to progress strength, endurance, balance, and safety and independence with functional mobility. Pt is highly motivated to participate in therapies and return to PLOF, anticipate will tolerate 3 hrs of therapy per day.

## 2019-09-23 NOTE — H&P
Post Admission Physician Evaluation:    I have compared Nicci Pemberton' condition on admission to acute rehabilitation to that outlined in the preadmission screen. History and physical exam performed by JESSE Josue, and myself No significant differences are identified and the patient remains appropriate for an inpatient rehabilitation facility level of care to manage medical issues and address functional impairments due to deconditioning s/p liver transplant.     Comorbid medical conditions being managed:  GAMAL requiring temporary CRRT, C. difficile infection, anorexia and malnutrition requiring tube feeds, delirium and metabolic encephalopathy, acute blood loss anemia requiring transfusions, medical device related pressure injury, epistaxis, hyperkalemia, steroid-induced hyperglycemia, hypothyroidism    Prior functional level:    previously independent for ambulation, mobility, and ADLs    Present function:   She is currently transferring to bathroom with FWW and CGA/SBA.  Completed standing ADLs with SBA at sink. She is completing bed mobility and supine to sit transfers with SBA.  Completing sit to stand with CGA with use of FWW.  Ambulating up to 200 feet with FWW with CGA and need for prolonged rest breaks.    Anticipated rehabilitation course:   Anticipate discharge home at a modified independent level for ambulation, mobility, and ADLs.   is available to assist with IADLs.    Will benefit from intensive rehabilitation includin minutes each of PT, OT and SLP  Rehabilitation nursing  Close management by physiatry    Prognosis: Good    Estimated length of stay: 7 days    Juan Manuel Kelley MD  Department of Rehabilitation Medicine  Pager: [phone]

## 2019-09-24 LAB
ALBUMIN SERPL-MCNC: 2.7 G/DL (ref 3.4–5)
ALP SERPL-CCNC: 251 U/L (ref 40–150)
ALT SERPL W P-5'-P-CCNC: 61 U/L (ref 0–50)
ANION GAP SERPL CALCULATED.3IONS-SCNC: 6 MMOL/L (ref 3–14)
AST SERPL W P-5'-P-CCNC: 51 U/L (ref 0–45)
BILIRUB SERPL-MCNC: 1.5 MG/DL (ref 0.2–1.3)
BUN SERPL-MCNC: 39 MG/DL (ref 7–30)
CALCIUM SERPL-MCNC: 9.4 MG/DL (ref 8.5–10.1)
CHLORIDE SERPL-SCNC: 99 MMOL/L (ref 94–109)
CO2 SERPL-SCNC: 33 MMOL/L (ref 20–32)
CREAT SERPL-MCNC: 0.96 MG/DL (ref 0.52–1.04)
GFR SERPL CREATININE-BSD FRML MDRD: 65 ML/MIN/{1.73_M2}
GLUCOSE SERPL-MCNC: 94 MG/DL (ref 70–99)
POTASSIUM SERPL-SCNC: 5 MMOL/L (ref 3.4–5.3)
PROT SERPL-MCNC: 6.3 G/DL (ref 6.8–8.8)
SODIUM SERPL-SCNC: 138 MMOL/L (ref 133–144)

## 2019-09-24 PROCEDURE — G0463 HOSPITAL OUTPT CLINIC VISIT: HCPCS

## 2019-09-24 PROCEDURE — 25000131 ZZH RX MED GY IP 250 OP 636 PS 637: Performed by: HOSPITALIST

## 2019-09-24 PROCEDURE — 97167 OT EVAL HIGH COMPLEX 60 MIN: CPT | Mod: GO | Performed by: STUDENT IN AN ORGANIZED HEALTH CARE EDUCATION/TRAINING PROGRAM

## 2019-09-24 PROCEDURE — 25000132 ZZH RX MED GY IP 250 OP 250 PS 637: Performed by: GENERAL PRACTICE

## 2019-09-24 PROCEDURE — 97535 SELF CARE MNGMENT TRAINING: CPT | Mod: GO | Performed by: STUDENT IN AN ORGANIZED HEALTH CARE EDUCATION/TRAINING PROGRAM

## 2019-09-24 PROCEDURE — 80053 COMPREHEN METABOLIC PANEL: CPT | Performed by: INTERNAL MEDICINE

## 2019-09-24 PROCEDURE — 36415 COLL VENOUS BLD VENIPUNCTURE: CPT | Performed by: INTERNAL MEDICINE

## 2019-09-24 PROCEDURE — 97162 PT EVAL MOD COMPLEX 30 MIN: CPT | Mod: GP

## 2019-09-24 PROCEDURE — 12800006 ZZH R&B REHAB

## 2019-09-24 PROCEDURE — 25000131 ZZH RX MED GY IP 250 OP 636 PS 637: Performed by: INTERNAL MEDICINE

## 2019-09-24 PROCEDURE — 25000128 H RX IP 250 OP 636: Performed by: HOSPITALIST

## 2019-09-24 PROCEDURE — 97530 THERAPEUTIC ACTIVITIES: CPT | Mod: GP

## 2019-09-24 PROCEDURE — 97116 GAIT TRAINING THERAPY: CPT | Mod: GP

## 2019-09-24 PROCEDURE — 99232 SBSQ HOSP IP/OBS MODERATE 35: CPT | Performed by: INTERNAL MEDICINE

## 2019-09-24 PROCEDURE — 25000132 ZZH RX MED GY IP 250 OP 250 PS 637: Performed by: HOSPITALIST

## 2019-09-24 PROCEDURE — 92523 SPEECH SOUND LANG COMPREHEN: CPT | Mod: GN

## 2019-09-24 PROCEDURE — 25000132 ZZH RX MED GY IP 250 OP 250 PS 637: Performed by: PHYSICAL MEDICINE & REHABILITATION

## 2019-09-24 RX ORDER — SIMETHICONE 80 MG
80 TABLET,CHEWABLE ORAL EVERY 6 HOURS PRN
Status: DISCONTINUED | OUTPATIENT
Start: 2019-09-24 | End: 2019-10-01 | Stop reason: HOSPADM

## 2019-09-24 RX ADMIN — ACETAMINOPHEN 325 MG: 325 TABLET, FILM COATED ORAL at 08:28

## 2019-09-24 RX ADMIN — MELATONIN 1000 UNITS: at 08:22

## 2019-09-24 RX ADMIN — FAMOTIDINE 20 MG: 20 TABLET, FILM COATED ORAL at 08:22

## 2019-09-24 RX ADMIN — MYCOPHENOLIC ACID 720 MG: 360 TABLET, DELAYED RELEASE ORAL at 09:30

## 2019-09-24 RX ADMIN — MULTIVITAMIN 15 ML: LIQUID ORAL at 08:22

## 2019-09-24 RX ADMIN — VALGANCICLOVIR 450 MG: 450 TABLET, FILM COATED ORAL at 08:22

## 2019-09-24 RX ADMIN — ZINC SULFATE CAP 220 MG (50 MG ELEMENTAL ZN) 220 MG: 220 (50 ZN) CAP at 08:22

## 2019-09-24 RX ADMIN — MELATONIN TAB 3 MG 3 MG: 3 TAB at 22:48

## 2019-09-24 RX ADMIN — LEVOTHYROXINE SODIUM 175 MCG: 50 TABLET ORAL at 08:22

## 2019-09-24 RX ADMIN — HEPARIN SODIUM 5000 UNITS: 5000 INJECTION, SOLUTION INTRAVENOUS; SUBCUTANEOUS at 13:31

## 2019-09-24 RX ADMIN — OXYCODONE HYDROCHLORIDE AND ACETAMINOPHEN 500 MG: 500 TABLET ORAL at 08:21

## 2019-09-24 RX ADMIN — MELATONIN 1000 UNITS: at 22:47

## 2019-09-24 RX ADMIN — VANCOMYCIN HYDROCHLORIDE 125 MG: KIT at 11:34

## 2019-09-24 RX ADMIN — Medication 1 PACKET: at 09:30

## 2019-09-24 RX ADMIN — CLOTRIMAZOLE 1 TROCHE: 10 LOZENGE ORAL; TOPICAL at 08:22

## 2019-09-24 RX ADMIN — ONDANSETRON 4 MG: 4 TABLET, ORALLY DISINTEGRATING ORAL at 08:23

## 2019-09-24 RX ADMIN — VANCOMYCIN HYDROCHLORIDE 125 MG: KIT at 22:43

## 2019-09-24 RX ADMIN — MYCOPHENOLIC ACID 720 MG: 360 TABLET, DELAYED RELEASE ORAL at 22:48

## 2019-09-24 RX ADMIN — HEPARIN SODIUM 5000 UNITS: 5000 INJECTION, SOLUTION INTRAVENOUS; SUBCUTANEOUS at 22:45

## 2019-09-24 RX ADMIN — MAGNESIUM OXIDE TAB 400 MG (241.3 MG ELEMENTAL MG) 400 MG: 400 (241.3 MG) TAB at 08:21

## 2019-09-24 RX ADMIN — VANCOMYCIN HYDROCHLORIDE 125 MG: KIT at 08:23

## 2019-09-24 RX ADMIN — HEPARIN SODIUM 5000 UNITS: 5000 INJECTION, SOLUTION INTRAVENOUS; SUBCUTANEOUS at 06:47

## 2019-09-24 RX ADMIN — DAPSONE 100 MG: 100 TABLET ORAL at 08:23

## 2019-09-24 RX ADMIN — CYCLOSPORINE 225 MG: 100 SOLUTION ORAL at 22:52

## 2019-09-24 RX ADMIN — CYCLOSPORINE 225 MG: 100 SOLUTION ORAL at 08:23

## 2019-09-24 RX ADMIN — VANCOMYCIN HYDROCHLORIDE 125 MG: KIT at 17:57

## 2019-09-24 RX ADMIN — ASPIRIN 325 MG ORAL TABLET 325 MG: 325 PILL ORAL at 08:21

## 2019-09-24 ASSESSMENT — MIFFLIN-ST. JEOR: SCORE: 1403.69

## 2019-09-24 NOTE — PROGRESS NOTES
09/23/19 1700   Quick Adds   Type of Visit Initial PT Evaluation      Language English   Living Environment   Lives With spouse   Living Arrangements house   Home Accessibility stairs to enter home;stairs within home   Number of Stairs, Main Entrance other (see comments)  (Front door: 2 with no railing, back deck: 12 with railing)   Stair Railings, Main Entrance railings safe and in good condition;railings on both sides of stairs   Number of Stairs, Within Home, Primary 6  (Split level)   Stair Railings, Within Home, Primary railings safe and in good condition   Transportation Anticipated family or friend will provide   Living Environment Comment PT: Pt lives in [facility] with  [facility]. Bedroom - queen bed, exits on R. Bathroom - raised toilet with vanity on R, tub shower, fixed shower head. Living area - no difficulty prior with standing from chairs/furniture.   Self-Care   Usual Activity Tolerance good   Current Activity Tolerance poor   Regular Exercise No   Equipment Currently Used at Home none   Activity/Exercise/Self-Care Comment PT: Pt reports becoming more and more tired and weak leading up to transplant, no AD at baseline. Pt enjoys spending [facility] at [facility].   Functional Level Prior   Ambulation 0-->independent   Transferring 0-->independent   Toileting 0-->independent   Bathing 0-->independent   Communication 0-->understands/communicates without difficulty   Swallowing 0-->swallows foods/liquids without difficulty   Cognition 0 - no cognition issues reported   Fall history within last six months no   Which of the above functional risks had a recent onset or change? ambulation;transferring;toileting;bathing;dressing   Prior Functional Level Comment PT: Pt is IND with all mobility and self cares, is retired and reports  is available 24/7 to assist. Pt does report driving PTA   General Information   Onset of Illness/Injury or Date of Surgery - Date 09/18/19   Referring  "Physician Mc Urban MD   Patient/Family Goals Statement \"To go home and get stronger.\"   Pertinent History of Current Problem (include personal factors and/or comorbidities that impact the POC) Taken per chart review: \"Nicci Pemberton is an 59 year old female with history of end stage liver disease secondary to ANGULO and heterozygous alpha-1 antitrypsin deficiency, HTN, and hypothyroidism. Admitted 19 with GAMAL and decompensated cirrhosis. Underwent  donor liver transplant on 19.\"   Precautions/Limitations fall precautions;immunosuppressed   Weight-Bearing Status - LUE weight-bearing as tolerated   Weight-Bearing Status - RUE weight-bearing as tolerated   Weight-Bearing Status - LLE weight-bearing as tolerated   Weight-Bearing Status - RLE weight-bearing as tolerated   Heart Disease Risk Factors Smoking;Lack of physical activity;Overweight;Stress;Family history;Age   General Info Comments PT: Pt is sitting upright in chair upon entry, able to verbally communicate without difficulty.    Cognitive Status Examination   Orientation orientation to person, place and time   Level of Consciousness alert   Follows Commands and Answers Questions 100% of the time   Personal Safety and Judgment intact   Memory intact   Pain Assessment   Patient Currently in Pain Yes, see Vital Sign flowsheet  (5/10 in stomach)   Integumentary/Edema   Integumentary/Edema Comments Pt has NG tube in place as well as liver incision, managed by nursing   Posture    Posture Forward head position   Range of Motion (ROM)   ROM Comment PT: Grossly WFL in B LEs   Strength   Strength Comments PT: B LEs are 3-/5 grossly   MMT: Shoulder   Shoulder Flexion - Left Side (4-/5) good minus, left   Shoulder ABduction - Left Side (4-/5) good minus, left   Shoulder Flexion - Right Side (4-/5) good minus, right   Shoulder ABduction - Right Side (4-/5) good minus, right   MMT: Elbow/Forearm, Rehab Eval   Elbow Flexion - Left Side (4/5) good, " left   Elbow Extension - Left Side (4-/5) good minus, left   Elbow Flexion - Right Side (4/5) good, right   Elbow Extension - Right Side (4-/5) good minus, right   MMT: Hand   Hand Muscle Testing Results : 4-/5 B   MMT: Wrist   Wrist Muscle Testing Results AROM intact   MMT: Hip, Rehab Eval   Hip Flexion - Left Side (3+/5) fair plus, left   Hip Extension - Left Side (4-/5) good minus, left   Hip Flexion - Right Side (4-/5) good minus, right   Hip Extension - Right Side (4-/5) good minus, right   MMT: Knee, Rehab Eval   Knee Flexion - Left Side (4-/5) good minus, left   Knee Extension - Left Side (3+/5) fair plus, left   Knee Flexion - Right Side (4-/5) good minus, right   Knee Extension - Right Side (3+/5) fair plus, right   MMT: Ankle, Rehab Eval   Ankle Dorsiflexion - Left Side (4-/5) good minus, left   Ankle Plantarflexion - Left Side (4-/5) good minus, left   Ankle Dorsiflexion - Right Side (4-/5) good minus, left   Ankle Plantarflexion - Right Side (4-/5) good minus, left   ARC Assessment Only   Acute Rehab FIM See FIM scores for Mobility/ADL Assessment   Balance   Balance other (describe)   Sitting Balance: Static good balance   Sitting Balance: Dynamic good balance  (good pelvic trunk dissociation while pivoting and scooting)   Sit-to-Stand Balance fair balance  (relies moderately on BLE against bed for support)   Standing Balance: Static fair balance  (no sway with BUE on FWW)   Standing Balance: Dynamic fair balance  (compensated trendelenberg B without UE support during gait)   Systems Impairment Contributing to Balance Disturbance cognitive;musculoskeletal   Identified Impairments Contributing to Balance Disturbance pain;decreased strength;impaired postural control   Balance Quick Add Sitting balance: Static;Sitting balance: dynamic;Sit to stand balance;Standing balance: static;Systems impairment contributing to balance disturbance;Standing balance: dynamic;Identified impairments contributing to  balance disturbance   Sensory Examination   Sensory Perception other (describe)   Sensory Perception Comments Intact B LE fine touch   Sensory Perception Quick Adds Light touch   Sensation Light Touch   LUE w/i normal limits   LLE w/i normal limits   RUE w/i normal limits   RLE w/i normal limits   Head/Neck w/i normal limits   Trunk w/i normal limits   Coordination   Coordination Comments Intact heel/shin test   Muscle Tone   Muscle Tone no deficits were identified   Modality Interventions   Planned Modality Interventions TENS   Planned Modality Interventions Comments PT: possibly TENS for pain management   General Therapy Interventions   Planned Therapy Interventions balance training;bed mobility training;gait training;groups;manual therapy;neuromuscular re-education;strengthening;stretching;transfer training;risk factor education;home program guidelines;progressive activity/exercise   Clinical Impression   Criteria for Skilled Therapeutic Intervention yes, treatment indicated   PT Diagnosis Decreased strength, endurance, poor activity tolerance, pain   Influenced by the following impairments see above   Functional limitations due to impairments Ambulation, transfer, out of chair tolerance, stairs   Clinical Presentation Evolving/Changing   Clinical Presentation Rationale PMH, post transplant status, age, home setup with stairs   Clinical Decision Making (Complexity) Moderate complexity   Therapy Frequency Daily   Predicted Duration of Therapy Intervention (days/wks) 10 days   Anticipated Equipment Needs at Discharge walker   Anticipated Discharge Disposition Home with Home Therapy   Risk & Benefits of therapy have been explained Yes   Patient, Family & other staff in agreement with plan of care Yes   Clinical Impression Comments PT: Pt is a 60 y/o female who presents for PT evaluation following hospitalization where pt received liver transplant. PT evaluation revealed decreased strength and endurance from reported  baselines which contribute to poor activity tolerance, need for assist with mobility and use of AD. Pt will benefit from skilled PT in order to address these deficits, aide in appropriate discharge planning and reduce pt's risk of hospital readmission.    Total Evaluation Time   Total Evaluation Time (Minutes) 40

## 2019-09-24 NOTE — PLAN OF CARE
FOCUS/GOAL  Bowel management, Bladder management, Nutrition/Feeding/Swallowing precautions and Skin integrity    ASSESSMENT, INTERVENTIONS AND CONTINUING PLAN FOR GOAL:  Orientation: Pt is alert and oriented x 3- did have disorientation to time, but was easily  redirected. Can be forgetful at times per report.  Bowel: Continent of bowel this shift via toilet  Bladder: Continent of bladder via toilet  Pain: No c/o pain this shift  Ambulation/Transfers: Assist of 1 with walker/ gait belt  Diet/ Liquids: Regular diet/ thin liquids/ takes pills whole with water. Requests some of the liquid medication to be put down ND tube due to taste and intermittent nausea.  Tubes/ Lines/ Drains: ND tube with bridal  Tube Feedinml/hr of NEPRO with 30ml free water flushes Q4hrs. Tube feeding started at 1840. Updated NOC nurse to prolong disconnection time.  Vitals were reviewed  Temp: 97.2  F (36.2  C) Temp src: Oral BP: 111/56 Pulse: 89   Resp: 18 SpO2: 91 % O2 Device: None (Room air)    Skin: See flow sheet- left a sticky note regarding blanchable redness with bridle for MD to follow up.   Bed alarm on for safety, call light within reach, Ok to continue with care plan.

## 2019-09-24 NOTE — PHARMACY-MEDICATION REGIMEN REVIEW
Pharmacy Medication Regimen Review  Nicci Pemberton is a 59 year old female who is currently in the Acute Rehab Unit.    Assessment: Upon review of the medications and patient chart the following irregularities were found: Medications without appropriate indications/durations: Vancomycin oral was started on 9/16 for C-diff, no duration of therapy entered.     Plan:   Please Add end date to Vancomycin oral order.     Attending provider will be sent this note for review.  If there are any emergent issues noted above, pharmacist will contact provider directly by phone.      Pharmacy will periodically review the resident's medication regimen for any PRN medications not administered in > 72 hours and discontinue them. The pharmacist will discuss gradual dose reductions of psychopharmacologic medications with interdisciplinary team on a regular basis.    Please contact pharmacy if the above does not answer specific medication questions/concerns.    Background:  A pharmacist has reviewed all medications and pertinent medical history today.  Medications were reviewed for appropriate use and any irregularities found are listed with recommendations.      Current Facility-Administered Medications:      acetaminophen (TYLENOL) tablet 325 mg, 325 mg, Oral, Q8H PRN, Devang Feng MD, 325 mg at 09/24/19 0828     alum & mag hydroxide-simethicone (MYLANTA ES/MAALOX  ES) suspension 15 mL, 15 mL, Oral, Q6H PRN, Mauro Boogie MD     aspirin (ASA) tablet 325 mg, 325 mg, Oral, Daily, Mauro Boogie MD, 325 mg at 09/24/19 0821     cycloSPORINE modified (GENGRAF BRAND) solution 225 mg, 225 mg, Oral, BID, Mauro Boogie MD, 225 mg at 09/24/19 0823     dapsone (ACZONE) tablet 100 mg, 100 mg, Oral, Daily, Mauro Boogie MD, 100 mg at 09/24/19 0823     famotidine (PEPCID) tablet 20 mg, 20 mg, Oral, Daily, Mauro Boogie MD, 20 mg at 09/24/19 0822     heparin sodium injection 5,000 Units, 5,000 Units, Subcutaneous, Q8H, Keagan  MD Mauro, 5,000 Units at 09/24/19 1331     levothyroxine (SYNTHROID/LEVOTHROID) tablet 175 mcg, 175 mcg, Oral, Daily, Mauro Boogie MD, 175 mcg at 09/24/19 0822     magnesium oxide (MAG-OX) tablet 400 mg, 400 mg, Oral, Daily, Mauro Boogie MD, 400 mg at 09/24/19 0821     melatonin tablet 3 mg, 3 mg, Oral, At Bedtime, Mauro Boogie MD, 3 mg at 09/23/19 2258     multivitamins w/minerals (CERTAVITE) liquid 15 mL, 15 mL, Per Feeding Tube, Daily, Mauro Boogie MD, 15 mL at 09/24/19 0822     mycophenolic acid (MYFORTIC BRAND) EC tablet 720 mg, 720 mg, Oral, BID, DhitalSterling MD, 720 mg at 09/24/19 0930     ondansetron (ZOFRAN-ODT) ODT tab 4 mg, 4 mg, Oral, QAM AC, Mauro Boogie MD, 4 mg at 09/24/19 0823     ondansetron (ZOFRAN-ODT) ODT tab 4 mg, 4 mg, Oral, Q6H PRN, Mauro Boogie MD     oxymetazoline (AFRIN) 0.05 % spray 2 spray, 2 spray, Both Nostrils, BID PRN, Mauro Boogie MD     Patient is already receiving anticoagulation with heparin, enoxaparin (LOVENOX), warfarin (COUMADIN)  or other anticoagulant medication, , Does not apply, Continuous PRN, Mary Alcantara PA     polyethylene glycol (MIRALAX/GLYCOLAX) Packet 17 g, 17 g, Oral, Daily PRN, Mauro Boogie MD     protein modular (PROSOURCE TF) 1 packet, 1 packet, Per Feeding Tube, Daily, Mauro Boogie MD, 1 packet at 09/24/19 0930     senna-docusate (SENOKOT-S/PERICOLACE) 8.6-50 MG per tablet 2 tablet, 2 tablet, Oral, BID PRN, Mauro Boogie MD     sodium chloride (OCEAN) 0.65 % nasal spray 1 spray, 1 spray, Both Nostrils, Q1H PRN, Mauro Boogie MD     valGANciclovir (VALCYTE) tablet 450 mg, 450 mg, Oral, Daily, Mauro Boogie MD, 450 mg at 09/24/19 0822     vancomycin (FIRVANQ) oral solution 125 mg, 125 mg, Oral, 4x Daily, Mauro Boogie MD, 125 mg at 09/24/19 1134     vitamin C (ASCORBIC ACID) tablet 500 mg, 500 mg, Oral, Daily, Mauro Boogie MD, 500 mg at 09/24/19 0821     vitamin D3 (CHOLECALCIFEROL) 1000 units  (25 mcg) tablet 1,000 Units, 1,000 Units, Oral, BID, Mauro Boogie MD, 1,000 Units at 09/24/19 0822     zinc sulfate (ZINCATE) capsule 220 mg, 220 mg, Oral, Daily, Mauro Boogie MD, 220 mg at 09/24/19 0822  No current outpatient prescriptions on file.   PMH:   ANGULO, Heterozygote for Alpha 1 Antitrypsin Deficiency, S/P Liver Transplant on 08/18/19, C.diff, Toxic metabolic encephalopathy, diarrhea, hyperkalemia, steroid induced hyperglycemia,  impaired intake, nausea, epistaxis,  acute blood loss anemia, and debility    Linnea Bennett, PharmD, BCPS

## 2019-09-24 NOTE — PLAN OF CARE
Acute Rehab Care Conference/Team Rounds    Type: Team Rounds    Present:  Dr. Mika Dewey, Mary Aclantara PA, Mima Nicole RN, Will Greenberg PT, Criss Stern OT, Nona Brown [facility], Quinten Zaragoza SLP, Janna Pickard , [facility] Mary Strickland    Discharge Barriers/Treatment/Education    Rehab Diagnosis: Nicci Pemberton is a 59 year old woman with a history of end stage liver disease secondary to ANGULO cirrhosis complicated by spontaneous bacterial peritonitis who presented in acute decompensated liver failure on 2019.  Underwent  donor liver transplant 2019.  Post operative course complicated by toxic and metabolic encephalopathy, C-Diff diarrhea, hyperkalemia, steroid induced hyperglycemia,  impaired intake, nausea, epistaxis,  acute blood loss anemia, and debility.     Active Medical Co-morbidities/Prognosis: see HPI and all notes.    Safety: Uses call light appropriately and able to verbalize her needs. A/O x3, no attempts at self-transfer noted overnight. Alarms on for safety.     Pain: Has denied c/o pain since admission, has PRN Tylenol available but hasn't used.     Medications, Skin, Tubes/Lines: Takes medications both by mouth (whole with thin liquids) and by NJ tube. Abdominal incision approximated and SILVIA. Excoriation noted between gluteal folds, and blanchable redness noted on septum d/t NJ tube.      Swallowing/Nutrition: No dysphagia related concerns.    Bowel/Bladder: Continent of B/B using toilet in bathroom, LB on .    Psychosocial: , has two adult sons. Lives in a home. Was recently at TCU at [facility]. No known anxiety/depression. No known substance use. Supported by  salary.     ADLs/IADLs: Pt is SBA for UB cares and MOD A for LB cares. SBA for transfers using walker. Pt is limited by deconditioning, decreased endurance, fatigue, pain and cognition. Will continue to assess cognition. Pt is making good progress and has great support  at home for dishcarge. Barriers are stairs inside and outside of her home. Goal is to progress to MOD I and discharge with HH OT. DME is TBD.    Mobility: Eval initiated. Demo sup<>sit SBA, STS FWW SBA, amb 50' with FWW SBA and 100 ft without assistive device CGA. Limited most by B knee pain during amb. Also reporting abdominal pain. Recommend HH PT initially at discharge. Will need FWW from [facility].    Cognition/Language: Being referred to SLP to assess cognition but not yet completed and scheduled to be completed in PM of 9/24. Further goals to be added as appropriate.     Community Re-Entry: w/c based navigating community distances d/t endurance deficits and pain; amb home distances 200-300 ft with FWW SBA.    Transportation: requires assist from friends/family    Decision maker: self  and spouse  Plan of Care and goals reviewed and updated.    Discharge Plan/Recommendations    Fall Precautions: continue    Patient/Family input to goals:     Anticipated rehab needs following discharge: home PT.OT.SLP,RN.SANAMA    Anticipated care giver support after discharge: spouse    Estimated length of stay: 1 week    Overall plan for the patient: as above      Utilization Review and Continued Stay Justification    Medical Necessity Criteria:    For any criteria that is not met, please document reason and plan for discharge, transfer, or modification of plan of care to address.    Requires intensive rehabilitation program to treat functional deficits?: Yes    Requires 3x per week or greater involvement of rehabilitation physician to oversee rehabilitation program?: Yes    Requires rehabilitation nursing interventions?: Yes    Patient is making functional progress?: Yes    There is a potential for additional functional progress? Yes    Patient is participating in therapy 3 hours per day a minimum of 5 days per week or 15 hours per week in 7 day period?:Yes    Has discharge needs that require coordinated discharge planning  approach?:Yes      Barriers/Concerns related to meeting medical necessity criteria:  none    Team Plan to Address Concern:  na      Final Physician Sign off    Statement of Approval: reviewed and approved. Ankur Dewey MD      Patient Goals  Social Work Goals: Confirm discharge recommendations with therapy, coordinate safe discharge plan and remain available to support and assist as needed.     OT goal: hygiene/grooming: modified independent  OT goal: upper body dressing: Modified independent  OT goal: lower body dressing: Modified independent  OT goal: upper body bathing: Supervision/stand-by assist  OT goal: lower body bathing: Supervision/stand-by assist  OT Goal: transfer: Modified independent  OT goal: toilet transfer/toileting: Modified independent, toilet transfer, cleaning and garment management  OT goal: meal preparation: Modified independent, with simple meal preparation  OT goal 1: Pt will be IND with UE HEP to improve strength and endurance.  OT/SILVIA face-to-face collaboration occurred, patient progress and plan of care reviewed.    PT Frequency: Daily  PT goal: bed mobility: Supine to/from sit, Independent, Rolling  PT goal: transfers: Modified independent, Sit to/from stand, Bed to/from chair, Assistive device  PT goal: gait: 100 feet, Modified independent, Rolling walker  PT goal: stairs: Greater than 10 stairs, Rail on right, Supervision/stand-by assist  PT goal: perform aerobic activity with stable cardiovascular response: continuous activity, 10 minutes, NuStep  PT goal 1: Car transfer, FWW, SBA  PT Goal 2: HEP - independent with handout for seated and BLE strength and endurance (amb vs seated floor bike)  PT Goal 3: Verbalize steps to safely recover from floor with assist  PT Goal 4: Attend Falls Prevention Class

## 2019-09-24 NOTE — PLAN OF CARE
Focus: Physio  D: Complaining of headache. Tylenol given. Up with assist of one and gait belt. Continent of bowel and bladder. P: Continue to monitor.

## 2019-09-24 NOTE — PROGRESS NOTES
[facility] Nurse Inpatient Pressure Injury Assessment   Reason for consultation: Evaluate and treat nose      ASSESSMENT  Previously assessed on [place]  Pressure Injury: on Left nare extending to columella, hospital acquired  This is a Medical Device Related Pressure Injury (MDRPI) due to Bridle string  Pressure Injury is Stage 2   Contributing factor of the pressure injury: pressure  Status: initial assessment  Recommend provider order: NA     TREATMENT PLAN  Bilateral nares and across septum wound: Every shift check to ensure dressing in place and that tube is NOT taped to face. Daily: change strip of comfeel with strips extending into bilateral nares across septum. For eating please tuck tube behind ear.    Orders Written  WO Nurse follow-up plan:twice weekly  Nursing to notify the Provider(s) and re-consult the WO Nurse if wound(s) deteriorates or new skin concern.    Patient History  According to provider note(s):  Nicci Pemberton is a 60 yo female with a past medical history significant for end stage liver disease 2/2 ANGULO and alpha 1 anti-trypsin deficiency now s/p DD OLT on 8/18/19.     Objective Data  Containment of urine/stool: Continent of bowel and Continent of bladder    Current Diet/ Nutrition:  Orders Placed This Encounter      Regular Diet Adult      Output:   I/O last 3 completed shifts:  In: 1018 [NG/GT:988]  Out: -     Risk Assessment:   Sensory Perception: 3-->slightly limited  Moisture: 4-->rarely moist  Activity: 3-->walks occasionally  Mobility: 3-->slightly limited  Nutrition: 3-->adequate  Friction and Shear: 2-->potential problem  Umair Score: 18      Labs:   Recent Labs   Lab 09/24/19  1106 09/23/19  0602   ALBUMIN 2.7* 2.3*   HGB  --  7.9*   WBC  --  4.9       Physical Exam  Skin inspection: focused nose    Wound Location:  Columella and left nare         Left nostril       Columella    Date of last Photo 9/23/19  Wound History: Per Dietitian Criss Iglesias wound found this morning with bridle  tight, twisted, imbedded in left nare so it was not visible, and tegaderm taped to right cheek. Dietitian then removed tape, untwisted bridle, remove from nasal mucosa, and loosen bridle. Per RN Aida Guevara patient has had four different NJs placed since 9/6, and goal is for patient to eat enough to be able to remove, however patient very tired today. Patient was unable to report how long nose has been hurting. Writer had cally DE CWOCN also assess wound.  9/23: Improved appearance of wound.  Noted tube taped to her left cheek so she can eat.  This pulled tube against her R nare with no injury at this time.  Will add to POC to allow pt to tuck tube behind her ear.    9/24 pt had dressing pulled taught against her cheek, educated pt/nurse on clipping to shirt with safety pin or tucking behind ear  Measurements (length x width x depth, in cm) 0.3 cm x 1.1 cm  x  0.1 cm   Wound Base:  20% pink 0% white 60% mucosal tissue depth  Tunneling N/A  Undermining N/A  Palpation of the wound bed: normal   Periwound skin: intact  Color: normal and consistent with surrounding tissue  Temperature: normal   Drainage:, small  Description of drainage: serosanguinous  Odor: none  Pain: during dressing change, sharp    Interventions  Current support surface: Standard  Atmos Air mattress -   Current off-loading measures: Pillows under calves  Repositioning aid: Pillows  Visual inspection of wound(s) completed   Tube Securement: bridle  Wound Care: was done per plan of care.  Supplies: at bedside  Educated provided: plan of care and wound progress  Education provided to: patient  and nurse  Discussed importance of:off-loading pressure to wound and not taping down to cheek.   Discussed plan of care with Patient, RN    Axel Sheffield, RN, BSN CWOCN

## 2019-09-24 NOTE — CONSULTS
[facility], [place]    Hospitalist Consultation    Date of Admission:  2019  Date of Consult (When I saw the patient): 19    Assessment & Plan     Nicci Pemberton is a 59 year old woman with a history of  ANGULO cirrhosis complicated by spontaneous bacterial peritonitis who presented in acute decompensated liver failure on 2019.  Underwent  donor liver transplant 2019.  Post operative course complicated by toxic and metabolic encephalopathy, C-Diff diarrhea, hyperkalemia, steroid induced hyperglycemia,  impaired oral intake, nausea, epistaxis,  acute blood loss anemia, and  Weakness. Extensive work up done for encephalopathy. It has not resolved. She is on oral vancomycin for cdiff. She is transferred to rehab.      1. ANGULO, Heterozygote for Alpha 1 Antitrypsin Deficiency, SP Liver Transplant on 19: Graft functioning well.    Immunosuppression: mycophenolate (Myfortic 720mg BID) and Cyclosporine 225 mg BID.  Tacrolimus was discontinued on  due to prolonged delirium. CSA  level goal 200-300 (12 hour trough).  Level  282  Prophylaxis: dapsone (x6 months), valganciclovir (x12 weeks), Mycelex completed inpatient.    Follow up: Transplant team    Transplant coordinator Zina [phone].  Biliary stent:  No stent on AXR 19  Donor status:  DBD  CMV D- / R +  EBV D + / R +    2. C Diff Colitis: Started on oral Vanc on 19. Recommend at least two weeks treatment given immunocompromised state. 10 days is short in my opinion. Monitor stools.   3. Toxic and Metabolic Encephalopathy: Has resolved. Tacro stopped and several days later encephalopathy improved.  Was seen by neurology, had work up done in patient at [facility].  4. Acute blood loss anemia on Anemia of chronic disease. Hb 7.9. Has low normal percent saturation and low serum iron. There could be additional iron deficiency given recurrent blood draws and poor nutrition. Can consider adding daily  iron 325 mg po daily. Folate, Vit B 12 was fine.   5. Hypothyroidism: Ct synthroid  6. Severe malnutrition: On TF, Tx  per primary team.       DVT Prophylaxis: Defer to primary service  Code Status: Full Code    Disposition: per primary    Mauro Boogie MD     Reason for Consult   Reason for consult: I was asked by Dr Kelley to evaluate this patient for medical comorbidities    Primary Care Physician   Wale Thurston    Chief Complaint   weakness    History is obtained from the patient    History of Present Illness      Nicci Pemberton is a 59 year old woman with a history of  ANGULO cirrhosis complicated by spontaneous bacterial peritonitis who presented in acute decompensated liver failure on 2019.  Underwent  donor liver transplant 2019.  Post operative course complicated by toxic and metabolic encephalopathy, C-Diff diarrhea, hyperkalemia, steroid induced hyperglycemia,  impaired oral intake, nausea, epistaxis,  acute blood loss anemia, and  Weakness. Extensive work up done for encephalopathy. It has not resolved. She is on oral vancomycin for cdiff. She is transferred to rehab.        She denies HA, NO nausea or vomiting right now. No abdominal pain.     Past Medical History    I have reviewed this patient's medical history and updated it with pertinent information if needed.   Past Medical History:   Diagnosis Date     Anemia      Cellulitis      Cirrhosis of liver (H) 2018     Heterozygous alpha 1-antitrypsin deficiency (H)      High hepatic iron concentration determined by biopsy of liver      Liver cirrhosis secondary to ANGULO (H)      Liver transplanted (H) 2019     Lymphedema      Osteoarthritis      SBP (spontaneous bacterial peritonitis) (H) 2019 in [facility]       Past Surgical History   I have reviewed this patient's surgical history and updated it with pertinent information if needed.  Past Surgical History:   Procedure Laterality Date     BENCH LIVER N/A  2019    Procedure: BACKBENCH PREPARATION, LIVER;  Surgeon: Mandeep Alford MD;  Location: [facility] OR     COLONOSCOPY N/A 2018    Procedure: COLONOSCOPY;  colonoscopy;  Surgeon: Hugo Patel MD;  Location: [facility] OR     IR FEEDING TUBE PLACEMENT W MOHAN/MD  2019     IR FLUORO 0-1 HOUR  2019     IR LUMBAR PUNCTURE  2019     TRANSPLANT LIVER RECIPIENT  DONOR N/A 2019    Procedure: TRANSPLANT, LIVER, RECIPIENT,  DONOR;  Surgeon: Mandeep Alford MD;  Location: [facility] OR       Prior to Admission Medications   Prior to Admission Medications   Prescriptions Last Dose Informant Patient Reported? Taking?   Cholecalciferol (VITAMIN D-3) 1000 units CAPS 2019 at 0842 [facility] Yes No   Sig: Take 1,000 Units by mouth 2 times daily   GENGRAF (BRAND) 100 MG/ML SOLUTION Unknown at Unknown time  No No   Sig: Take 2.25 mLs (225 mg) by mouth 2 times daily   MYFORTIC (BRAND) 360 MG EC tablet 2019 at 0842  No No   Sig: Take 2 tablets (720 mg) by mouth 2 times daily   aspirin (ASA) 325 MG tablet 2019 at 0842  No No   Sig: Take 1 tablet (325 mg) by mouth daily   dapsone (ACZONE) 100 MG tablet 2019 at 0842  No No   Sig: Take 1 tablet (100 mg) by mouth daily   famotidine (PEPCID) 20 MG tablet 2019 at 0842  No No   Sig: Take 1 tablet (20 mg) by mouth daily   heparin sodium 5000 UNIT/0.5ML injection 2019 at 1308  No No   Sig: Inject 0.5 mLs (5,000 Units) Subcutaneous every 8 hours   levothyroxine (SYNTHROID/LEVOTHROID) 175 MCG tablet 2019 at 0842  No No   Sig: Take 1 tablet (175 mcg) by mouth daily   melatonin 3 MG tablet 2019 at 2014  No No   Sig: Take 1 tablet (3 mg) by mouth At Bedtime   menthol, Topical Analgesic, 2.5% (BENGAY VANISHIN SCENT) 2.5 % GEL topical gel Unknown at Unknown time  No No   Sig: Apply topically every 6 hours as needed for moderate pain   multivitamins w/minerals (CERTAVITE) liquid 2019 at 0836  No No   Sig: 15 mLs by Per  Feeding Tube route daily   ondansetron (ZOFRAN-ODT) 4 MG ODT tab Unknown at Unknown time  No No   Sig: Take 1 tablet (4 mg) by mouth every 6 hours as needed for nausea or vomiting   ondansetron (ZOFRAN-ODT) 4 MG ODT tab 2019 at 0834  No No   Sig: Take 1 tablet (4 mg) by mouth every morning (before breakfast)   polyethylene glycol (MIRALAX/GLYCOLAX) packet Unknown at Unknown time  No No   Sig: Take 17 g by mouth daily as needed for constipation   protein modular (PROSOURCE TF) LIQD 2019 at 0836  No No   Si packet by Per Feeding Tube route daily   senna-docusate (SENOKOT-S/PERICOLACE) 8.6-50 MG tablet Unknown at Unknown time  No No   Sig: Take 2 tablets by mouth 2 times daily as needed for constipation   sodium chloride (OCEAN) 0.65 % nasal spray 2019 at 0433  No No   Sig: Spray 2 sprays into both nostrils 2 times daily as needed for congestion   valGANciclovir (VALCYTE) 450 MG tablet 2019 at 0842  No No   Sig: Take 1 tablet (450 mg) by mouth daily   vancomycin (FIRVANQ) 50 MG/ML oral solution 2019 at 1152  No No   Sig: Take 2.5 mLs (125 mg) by mouth 4 times daily   vitamin C 500 MG TABS 2019 at 0842  No No   Sig: Take 1 tablet (500 mg) by mouth daily   zinc sulfate (ZINCATE) 220 (50 Zn) MG capsule 2019 at 0842  No No   Sig: Take 1 capsule (220 mg) by mouth daily      Facility-Administered Medications: None     Allergies   Allergies   Allergen Reactions     Food      Fruits with cores and pits  Apples     Sulfa Drugs Unknown     Swollen joints     Furosemide Rash       Social History   I have reviewed this patient's social history and updated it with pertinent information if needed. Nicci Pemberton  reports that she quit smoking about 8 years ago. She has never used smokeless tobacco. She reports that she does not drink alcohol or use drugs.    Family History   I have reviewed this patient's family history and updated it with pertinent information if needed.   Family History    Problem Relation Age of Onset     Cirrhosis No family hx of      Liver Cancer No family hx of        Review of Systems   The 10 point Review of Systems is negative other than noted in the HPI or here.     Physical Exam   Temp: 98.5  F (36.9  C) Temp src: Oral BP: 106/55 Pulse: 89   Resp: 16 SpO2: 95 % O2 Device: None (Room air)    Vital Signs with Ranges  Temp:  [97.1  F (36.2  C)-98.6  F (37  C)] 98.5  F (36.9  C)  Pulse:  [83-89] 89  Heart Rate:  [71] 71  Resp:  [16-18] 16  BP: (106-115)/(55-68) 106/55  SpO2:  [90 %-95 %] 95 %  193 lbs 0 oz    Constitutional:  Awake, alert, cooperative, no apparent distress.  Eyes: Conjunctiva and pupils examined and normal.  HEENT: Moist mucous membranes, normal dentition.  Respiratory: Clear to auscultation bilaterally, no crackles or wheezing.  Cardiovascular: Regular rate and rhythm, normal S1 and S2, and no murmur noted.  GI: incision from surgery healed. Mild discomofrt in upper abdomen noted on palpation.   Lymph/Hematologic: No anterior cervical or supraclavicular adenopathy.  Skin: No rashes, no cyanosis, 1+ leg edema noted  Musculoskeletal: No joint swelling, erythema or tenderness.  Neurologic: Alert and oriented times three. Cranial nerves 2-12 intact, normal strength and sensation.  Psychiatric:  no obvious anxiety or depression.      Data   -Data reviewed today: All pertinent laboratory and imaging results from this encounter were reviewed. I personally reviewed no images or EKG's today.  Recent Labs   Lab 09/23/19  0602 09/22/19  0711 09/21/19  0655   WBC 4.9 4.8 4.6   HGB 7.9* 7.7* 7.8*   * 104* 103*    207 195    138 134   POTASSIUM 4.7 4.8 4.7   CHLORIDE 103 102 99   CO2 31 31 29   BUN 36* 35* 35*   CR 0.78 0.76 0.80   ANIONGAP 4 6 7   JACOB 9.3 9.2 9.0   * 96 90   ALBUMIN 2.3* 2.3* 2.3*   PROTTOTAL 5.6* 5.6* 5.8*   BILITOTAL 1.0 1.2 1.2   ALKPHOS 230* 216* 222*   ALT 57* 50 47   AST 52* 48* 42       No results found for this or any  previous visit (from the past 24 hour(s)).

## 2019-09-24 NOTE — PROGRESS NOTES
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       DD OLT 8/18/19 without stent-Transaminases, AP, Tbili slightly uptrending. Recommend a liver transplant US with doppler.     Immunosuppression management:    CSA started 9/2.  mg BID to titrate to a goal of 200-300. Last Level 9/23 282 (11.5 hour trough). No change. Repeat level Monday/Thursday.  Myfortic 720 mg BID.(increaseded 9/23 due to uptrending LFT's).      Yvette Reich, NP       22:07

## 2019-09-24 NOTE — PLAN OF CARE
FOCUS/GOAL  Bowel management, Bladder management, Nutrition/Feeding/Swallowing precautions, and Mobility    ASSESSMENT, INTERVENTIONS AND CONTINUING PLAN FOR GOAL:  Ao1 with walker for transfers. Up to bathroom x4 this shift, continent of bladder and small loose BM each time. Independent with pericare, moisture barrier cream applied to gluteal fold/coccyx area. NJ tube intact and patent, TF infused as ordered and pt tolerated well, turned off at 0640. Pt declined scheduled Zofran this morning, reported that she didn't feel she needed it. Call light in reach and pt using appropriately, bed alarm on with no attempts at self transfer noted. Enteric precautions maintained, will continue with POC.
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
## 2019-09-24 NOTE — PLAN OF CARE
Nicci will need 7 days in order to return home with intermittent support and HH PT.    Mobility Status: CGA in room with FWW.    Primary Barriers: B knee pain from OA, impaired cognition/memory, BLE lymphedema.    DME Needs: FWW

## 2019-09-24 NOTE — PHARMACY-CONSULT NOTE
Pharmacy Tube Feeding Consult    Medication reviewed for administration by feeding tube and for potential food/drug interactions.    Recommendation: No changes are needed at this time.     Pharmacy will continue to follow as new medications are ordered.    Linnea Bennett, FrankieD, BCPS

## 2019-09-24 NOTE — PROGRESS NOTES
[facility], [facility] Progress Note - Hospitalist Service       Date of Admission:  2019  Assessment & Plan       Nicci Pemberton is a 59 year old woman with a history of  ANGULO cirrhosis complicated by spontaneous bacterial peritonitis who presented in acute decompensated liver failure on 2019.  Underwent  donor liver transplant 2019.  Post operative course complicated by toxic and metabolic encephalopathy, C-Diff diarrhea, hyperkalemia, steroid induced hyperglycemia,  impaired oral intake, nausea, epistaxis,  acute blood loss anemia, and  Weakness. Extensive work up done for encephalopathy. It has been slowly resolving  She is on oral vancomycin for cdiff. She is transferred to rehab.       ANGULO, Heterozygote for Alpha 1 Antitrypsin Deficiency, S/P Liver Transplant on 19:   Graft functioning well.  Mild increase in LFT's  On the discharge day and hence Mycophenolate was increased from 500 BID to 720  Immunosuppression: mycophenolate (Myfortic 720mg BID) Cyclosporine 225 mg BID.  Tacrolimus was discontinued on  due to prolonged delirium. CSA  level goal 200-300 (12 hour trough).  Level  at  282  Prophylaxis: dapsone (x6 months), valganciclovir (x12 weeks), Mycelex completed inpatient.    Follow up: Transplant team   Mild raise in t bili at 1.5 and alkaline phosphatase at 251  Transplant coordinator Crystal [phone].  Biliary stent:  No stent on AXR 19  Donor status:  DBD  CMV D- / R +  EBV D + / R +     2. C Diff Colitis:   Started on oral Vanc on 19. Recommend at least two weeks treatment given immunocompromised state.    Monitor stools.   3. Toxic and Metabolic Encephalopathy:   Has resolved. Tacro stopped and several days later encephalopathy improved.  Was seen by neurology, had work up done in patient at [facility].  4. Acute blood loss anemia on Anemia of chronic disease. Hb 7.9. Has low normal percent saturation and low serum  iron. There could be additional iron deficiency given recurrent blood draws and poor nutrition. Can consider adding daily iron 325 mg po daily. Folate, Vit B 12 was fine.     5. Hypothyroidism: Ct synthroid  6. Severe malnutrition: On TF, Tx  per primary team.          Diet: Adult Formula Drip Feeding: Continuous Nepro; Nasoduodenal tube; Goal Rate: 65; mL/hr; From: 6:00 PM; 6:00 AM; Medication - Feeding Tube Flush Frequency: At least 15-30 mL water before and after medication administration and with tube clogging; No  Regular Diet Adult  Snacks/Supplements Adult: Nepro Oral Supplement; Between Meals  Room Service  Calorie Counts    DVT Prophylaxis: Heparin SQ  Forrester Catheter: not present  Code Status: Full Code      Disposition Plan   Expected discharge:  To be determined by primary team   Entered: Griffin Knight MD 09/24/2019, 10:05 AM       The patient's care was discussed with the Patient and Primary team as well as transplant team     Griffin Knight MD  Hospitalist Service  [facility], [place]    ______________________________________________________________________    Interval History   This is the first time I am meeting with Nicci. Her H&P and progress of events has been reviewed.  Denies chest pain/ SOB  No fever or chills  Says that she did not sleep too well . The NJ tube preston cming in her way       Data reviewed today: I reviewed all medications, new labs and imaging results over the last 24 hours. I personally reviewed no images or EKG's today.    Physical Exam   Vital Signs: Temp: 97.9  F (36.6  C) Temp src: Oral BP: 110/67 Pulse: 89   Resp: 16 SpO2: 92 % O2 Device: None (Room air)    Weight: 193 lbs 0 oz  General Appearance: Awake, alert and not in distress   Respiratory: Clear breath sounds bilaterally   Cardiovascular: Normal heart sounds   GI: Soft, non tender. Liver transplant incision scar appears to be healing well   Skin: No bruising /  bleeding   Other: Bilateral lower extremity edema 2+    Data   Recent Labs   Lab 09/23/19  0602 09/22/19  0711 09/21/19  0655   WBC 4.9 4.8 4.6   HGB 7.9* 7.7* 7.8*   * 104* 103*    207 195    138 134   POTASSIUM 4.7 4.8 4.7   CHLORIDE 103 102 99   CO2 31 31 29   BUN 36* 35* 35*   CR 0.78 0.76 0.80   ANIONGAP 4 6 7   JACOB 9.3 9.2 9.0   * 96 90   ALBUMIN 2.3* 2.3* 2.3*   PROTTOTAL 5.6* 5.6* 5.8*   BILITOTAL 1.0 1.2 1.2   ALKPHOS 230* 216* 222*   ALT 57* 50 47   AST 52* 48* 42

## 2019-09-24 NOTE — PLAN OF CARE
Patient presenting with mild to moderate cognitive linguistic impairment most notable for impairments in both short and long-term memory, attention and reasoning/problem solving.  Very mild difficulties with more structured word finding tasks but suspect that these are more due to cognitive impairments versus true language impairments such as aphasia.  Patient's language function does seem adequate for further formal cognitive evaluation and will be completed in subsequent days.  Current level of function is significantly below her baseline patient would benefit from skilled intervention to maximize function and level of independence to facilitate a return to home.

## 2019-09-24 NOTE — PROGRESS NOTES
19 1500   General Information, SLP   Type of Evaluation Cognitive-Linguistic   Type of Visit Initial   Start of Care Date 19   Onset of Illness/Injury or Date of Surgery - Date 19   Referring Physician JESSE Alcantara   Patient/Family Goals Statement Get rid of tube feeding and get home.   Pertinent History of Current Problem Nicci Pemberton is a 59 year old woman with a history of end stage liver disease secondary to ANGULO cirrhosis complicated by spontaneous bacterial peritonitis who presented in acute decompensated liver failure on 2019.  Underwent  donor liver transplant 2019.  Post operative course complicated by toxic and metabolic encephalopathy, C-Diff diarrhea, hyperkalemia, steroid induced hyperglycemia,  impaired intake, nausea, epistaxis,  acute blood loss anemia, and debility.     General Info Comments Referred to speech therapy to assess cognition in setting of some changes in her cognition due to encephalopathy secondary to liver transplant   Speech   Speech Comments Speech 100% intelligible and patient feels no different   Language: Auditory Comprehension (understanding of spoken language)   Yes/No Sentence and Simple Paragraph; [place] Diagnostic Aphasia Exam 3 short (out of 6 total) 4   Paragraph; Discourse Comprehension Test (out of 8 total; less than 7 is below mean) 4   Functional Assessment Scale (Auditory Comprehension) Mild Impairment   Comments (Auditory Comprehension) Performance was made worse by difficulties that are being noted in memory as well as attention.   Language: Verbal Expression (use of spoken language to express information)   Define Words; [place] Test for Differential Diagnosis Of Aphasia (out of 10 total) 9   Generative Naming Score; Cognitive Linguistic Quick Test 4   Generative Naming; Cognitive Linguistic Quick Test Result Below mean   Functional Assessment Scale (Verbal Expression) Minimal Impairment   Comments (Verbal  Expression) Patient reports occasional word finding difficulties.  During the define words subtest, patient having difficulties being able to describe 1 of the words but he immediately followed up with a similar and much more verbally complex word.  No difficulties are noted in  casual conversation but difficulties being noted more structured task indicative of impairments with attention.   Reading Comprehension (understanding of written language)   Sentences and Paragraphs; [place] Diagnostic Aphasia Exam (out of 10 total) 10   Functional Assessment Scale (Reading Comprehension) No Impairment   Comments (Reading Comprehension) Patient skipped over 1 of the passages but when going back was able to answer correctly   Written Expression (use of writing to express information)   Comments (Written Expression) Not tested due to time limitations   Cognitive Status Examination   Attention impaired   Behavioral Observations alert   Short Term Memory impaired   Long Term Memory impaired   Reasoning impaired   Additional cognitive-linguistic evaluation indicated  yes   Standardized cognitive-linguistic assessment completed to be completed during future session;Lori-Fox;RBANS   Cognitive Status Exam Comments Patient is leg which function is adequate for more in-depth assessment of cognition.   Clinical Impression, SLP Eval   Criteria for Skilled Therapeutic Interventions Met Yes   SLP Diagnosis Mild to moderate cognitive linguistic impairment   Influenced by the following factors/impairments Encephalopathy   Functional limitations due to impairments Decreased level of independence   Rehab Potential Good, to achieve stated therapy goals   Rehab potential affected by Recent onset and steady improvements noted over course of hospital stay   Therapy Frequency Daily   Predicted Duration of Therapy Intervention (days/wks) 1 week   Anticipated Discharge Disposition Home with Assist   Risks and Benefits of Treatment have been  explained. Yes   Patient, Family & other staff in agreement with plan of care Yes   Clinical Impression Comments Patient presenting with mild to moderate cognitive linguistic impairment most notable for impairments in both short and long-term memory, attention and reasoning/problem solving.  Very mild difficulties with more structured word finding tasks but suspect that these are more due to cognitive impairments versus true language impairments such as aphasia.  Patient's language function does seem adequate for further formal cognitive evaluation and will be completed in subsequent days.  Current level of function is significantly below her baseline patient would benefit from skilled intervention to maximize function and level of independence to facilitate a return to home.   Total Evaluation Time      Total Evaluation Time (Minutes) 45

## 2019-09-24 NOTE — PROGRESS NOTES
[facility], [place]   Physical Medicine and Rehabilitation Daily Note    Nicci Pemberton is a 59 year old woman with a history of end stage liver disease secondary to ANGULO cirrhosis complicated by spontaneous bacterial peritonitis who presented in acute decompensated liver failure on 2019.  Underwent  donor liver transplant 2019.  Post operative course complicated by toxic and metabolic encephalopathy, C-Diff diarrhea, hyperkalemia, steroid induced hyperglycemia,  impaired intake, nausea, epistaxis,  acute blood loss anemia, and debility.   Admitted to ARU on 2019          Assessment and Plan:     Mobility: Barriers noted / impact on function        Current Function / Plan: Rehab therapies underway today.   Activities of Daily Living: Barriers noted / impact on function        Current Function / Plan: As above.   Language: No barriers identified        Current Function / Plan:    Cognition: Barriers noted / impact on function        Current Function / Plan: Due to liver failure and now s/p liver transplant.   Psychological: Barriers noted / impact on function        Current Function / Plan: Has Health Psychology consult pending. Patient has very supportive .   Dysphasia / Nutrition: Barriers noted / impact on function        Current Function / Plan: Has nasal gastric feeding tube. Getting supplements. Taking in nutrition orally, but appetite is poor at this time.   Bowel / Bladder Function: No barriers identified        Current Function / Plan:    Patient / Caregiver Support and Educational Needs:    Other: Followed by Hospitalist for multiple medical issues related to liver failure and subsequent liver transplant.    Rehab team rounds today - see separate note for details.   Estimated discharge date: 10/1/19   Discharge Plan Notes:                       Review of Systems:   The 10 point Review of Systems is negative other than noted in the HPI           "Medications:     Current Facility-Administered Medications   Medication     acetaminophen (TYLENOL) tablet 325 mg     alum & mag hydroxide-simethicone (MYLANTA ES/MAALOX  ES) suspension 15 mL     aspirin (ASA) tablet 325 mg     cycloSPORINE modified (GENGRAF BRAND) solution 225 mg     dapsone (ACZONE) tablet 100 mg     famotidine (PEPCID) tablet 20 mg     heparin sodium injection 5,000 Units     levothyroxine (SYNTHROID/LEVOTHROID) tablet 175 mcg     magnesium oxide (MAG-OX) tablet 400 mg     melatonin tablet 3 mg     multivitamins w/minerals (CERTAVITE) liquid 15 mL     mycophenolic acid (MYFORTIC BRAND) EC tablet 720 mg     ondansetron (ZOFRAN-ODT) ODT tab 4 mg     ondansetron (ZOFRAN-ODT) ODT tab 4 mg     oxymetazoline (AFRIN) 0.05 % spray 2 spray     Patient is already receiving anticoagulation with heparin, enoxaparin (LOVENOX), warfarin (COUMADIN)  or other anticoagulant medication     polyethylene glycol (MIRALAX/GLYCOLAX) Packet 17 g     protein modular (PROSOURCE TF) 1 packet     senna-docusate (SENOKOT-S/PERICOLACE) 8.6-50 MG per tablet 2 tablet     sodium chloride (OCEAN) 0.65 % nasal spray 1 spray     valGANciclovir (VALCYTE) tablet 450 mg     vancomycin (FIRVANQ) oral solution 125 mg     vitamin C (ASCORBIC ACID) tablet 500 mg     vitamin D3 (CHOLECALCIFEROL) 1000 units (25 mcg) tablet 1,000 Units     zinc sulfate (ZINCATE) capsule 220 mg             Physical Exam:     Vitals were reviewed  Patient Vitals for the past 24 hrs:   BP Temp Temp src Pulse Resp SpO2 Height Weight   09/24/19 0915 110/67 97.9  F (36.6  C) Oral 89 16 92 % -- --   09/24/19 0700 -- -- -- -- -- -- -- 87.5 kg (193 lb)   09/24/19 0050 -- -- -- -- -- 95 % -- --   09/24/19 0043 106/55 98.5  F (36.9  C) Oral 89 16 90 % -- --   09/23/19 1951 111/56 97.2  F (36.2  C) Oral 89 18 91 % -- --   09/23/19 1559 115/62 97.1  F (36.2  C) Oral 83 18 92 % -- --   09/23/19 1439 112/64 97.7  F (36.5  C) Oral 85 18 92 % 1.575 m (5' 2\") 88.3 kg (194 lb " 11.2 oz)     Constitutional:   awake, alert, cooperative, no apparent distress, and appears stated age. Has nasal feeding tube in place.     Eyes:   clear     Lungs:   Clear. Breathing comfortably on room air.     Cardiovascular:   rrr     Abdomen:   Distended, nontender.     Musculoskeletal:   moderate lower extremity edema present     Neurologic:   Alert, oriented, cooperative. Answers simple questions appropriately. Slow mental processing. Is diffusely weak with strength generally -4/5 in all four limbs.       I, Ankur Dewey MD, PhD, saw and evaluated Ms. Nicci Pemberton today in her room.  was present for visit.     I have reviewed vital signs, medications, and labs.     My key history or physical exam findings: patient is breathing comfortably on room air. She has poor appetite as yet and is receiving supplemental tube feeding for nutrition.  she is quite weak with strength generally 4-/5 in all four limbs     Key management decisions made by me: continue current therapy plan.      Total time today >35 minutes.      Ankur Dewey MD, PhD  Date of Service (when I saw the patient): 09/24/19

## 2019-09-24 NOTE — PROGRESS NOTES
"CLINICAL NUTRITION SERVICES - ASSESSMENT NOTE     Nutrition Prescription    RECOMMENDATIONS FOR MDs/PROVIDERS TO ORDER:  -Consider discontinue Vitamin C and Zinc Sulfate supplementation after 10 day course completed per wound care protocol (ordered on 9/20 per hospital RD note).  -Monitor nausea control and potential need for adjustments in antinausea regimen.     -Monitor Phosphorus trends and potential need for Phos binder with meals.     Malnutrition Status:    Severe malnutrition in the context of acute on chronic illness    Recommendations already ordered by Registered Dietitian (RD):  -Continue current cycled 12 hour TF.  -Continue Nepro berry as currently ordered between meals bid at 10 am and 2 pm daily.  -Calorie Counts x 3 days (9/24-9/26).  -Requested time blocked off in therapy for breakfast daily (from 8-9 am).      Future/Additional Recommendations:  -Continue to monitor po intakes via Calorie Counts.  Recommend patient consume ~1400 kcal, 70 grams protein (~75% needs) prior to discontinuing TF  -Encourage small meals including protein sources + snacks/supplements between.  Follow up for supplement acceptance and adjust as indicated.  -Review post transplant diet guidelines before discharge.     REASON FOR ASSESSMENT  Nicci Pemberton is a/an 59 year old female assessed by the dietitian for Provider Order - Registered Dietitian to Assess and Order TF per Medical Nutrition Therapy Protocol    NUTRITION HISTORY  Per chart review, pt was followed by RD during hospitalization.  Supported on TF initially Nutren 1.5 and then changed to Nepro as of 9/13 to provide lower potassium formula.  TF cycled beginning 9/19.      Per RD reassessment note 9/18:  2 gm K+ diet   \"PO intake: Limited by nausea and very poor appetite (bites only).  Occasionally takes sips of Boost and takes pills in applesauce.\"  RD recommended considering liberalizing diet if K+ continues to be normal.      Per RD note 9/20:  \"WOC nurse " "findings: (9/19) stage 2 pressure injury left nare     Interventions:  1. Discussed wound care findings with team. Team okayed this write to order: zinc sulfate 220 mg and Vit C 500 mg x 10 days   2. Left post transplant nutrition handouts: Your diet after Transplant and food safety at bedside. Patient was soundly sleeping.\"     Per last RD note 9/23:  \"Calorie counts(9/19-9/21) = 443 kcal, 20 grams protein (20-25% of assessed needs)   Nepro @ 65 ml/hr x 16 hours + 1 pkt Prosource TF daily  - 1912 kcal (32 kcal/kg), 95 grams protein (1.6g/kg) daily\"     \"Overall, improving appetite/supplement use.  Took 5 bites of eggs/mushrooms this morning.  She reports she feels nauseated. Trying to get her pills down.  Tolerates rodriguez Nepro and is willing to continue drinking 1 of these daily.  Writer inspected her L nare wound which appears to be improving.\"      TF decreased slightly to Nepro at 65 mL/hr x 14 hours (6 pm to 8 am) + 1 packet Prosource TF to meet ~90% lower end kcal/pro needs.    Recommendations:  -\"Continue Nepro berry flavor, bid.  -Continue Calorie Counts 9/24-9/26.    Recommend patient consume ~1400 kcal, 70 grams protein (~75% needs) prior to discontinuing TF.\"    CURRENT NUTRITION ORDERS  Diet: Regular  Food Allergy:  Fruits with cores and pits, apples  Nepro berry between meals at 10 am and 2 pm daily.    Room Service with Assist (changed per RN today, to begin 9/24).   Comments:  Per pt request, does not want room service with assist.    Nutrition Support:  Nepro via ND at 65 mL/hr x 12 hours (6 pm to 6 am) + 1 packet Prosource.  This provides: 780 mLs, 1444 kcals, 74 g protein,10 g Fiber, 569 mLs water.      Note:  TF access appears to be NJ as of 9/6 replacement by IR (although per current orders noted as ND).  TF ordered on admission for a 12 hour cycle vs. 14 hours as  per transplant RD recommendation on 9/23.  Based on recent Calorie Counts (9//19-9/21), will continue TF as currently ordered and " "continue to monitor po intakes for need to adjust.    Water Flush:  30 mLs every 4 hours and 60 mLs before and after each cyclic feeding.    Intake/Tolerance:  Pt just admitted yesterday afternoon.  Per [facility] review for dinner last evening ordered cold cereal, orange, grapes and a cup of water.  This am ordered only cream of wheat and whole milk-noted mostly untouched at bedside. Pt states she has pressure and pain in her abdomen that at times \"makes her want to cry.\"  She denies nausea.  Reports poor appetite.  Per pt interruptions at meal time can make it difficult to eat.      LABS  Labs reviewed  (9/23) K 4.7  BUN 36 (H)  Cr 0.78  Phos 5.0 (H)  (9/16)  CDiff +    MEDICATIONS  Medications reviewed  Certavite  Vitamin C 500 mg daily  Zinc Sulfate 220 mg daily  Vitamin D3 1000 Units bid  Magnesium Oxide 400 mg daily  Vancomycin  Zofran scheduled am before breakfast and also every 6 hours prn.      ANTHROPOMETRICS  Height: 157.5 cm (5' 2\")  Most Recent Weight: 87.5 kg (193 lb)    IBW: 110 lbs  BMI: Obesity Grade II BMI 35-39.9  Weight History:   Per chart review, hospital admission weight (8/16):  104.3 kg vs. 105.7 kg.  Per last RD note 9/18:  \"Weight stable over the last week.  Significant weight loss since admission.  Current dry weight is 87.5 kg (overall down 16.8 kg/16% in ~3.5 weeks). Patient had severe lymphedema pre-transplant but continues to have moderate generalized/LE edema on exam. Likely that this weight loss is a combination of dry/fluid losses given nutrition hx.\"   Wt Readings from Last 10 Encounters:   09/24/19 87.5 kg (193 lb)   09/23/19 88.3 kg (194 lb 9.6 oz)   07/11/19 100.7 kg (222 lb)   04/18/19 93.4 kg (205 lb 14.4 oz)   02/19/19 107.6 kg (237 lb 3.2 oz)   11/20/18 105.4 kg (232 lb 6.4 oz)   08/21/18 103.1 kg (227 lb 3.2 oz)   06/27/18 101.9 kg (224 lb 9.6 oz)   06/22/18 97.5 kg (215 lb)   06/18/18 101.1 kg (222 lb 14.4 oz)       Dosing Weight: 60 kg (adjusted from ARC admit wt of " "87.5 kg  and IBW 50 kg).    ASSESSED NUTRITION NEEDS  Estimated Energy Needs: 1800 kcals/day (30 kcals/kg)  Justification: Liver transplant needs, obesity   Estimated Protein Needs:  grams protein/day (1.5 - 2 grams of pro/kg)  Justification: Post-op liver transplant  Estimated Fluid Needs: (1 mL/kcal)   Justification: Per provider pending fluid status    PHYSICAL FINDINGS  See malnutrition section below.  Per chart review:  9/6:  NJ feeding tube replaced in IR.  \"Abdominal: soft incision well healed CDI, reported pain.\"    WOCN assessment (9/24):  \"Previously assessed on [place]  Pressure Injury: on Left nare extending to LTAC, located within [facility], hospital acquired  This is a Medical Device Related Pressure Injury (MDRPI) due to Bridle string  Pressure Injury is Stage 2   Contributing factor of the pressure injury: pressure  Status: initial assessment  Recommend provider order: NA\"    Please see note for further details.    MALNUTRITION  % Intake: Does not meet criteria recently, TF meeting majority of needs  % Weight Loss: > 2% in 1 week (severe)  Subcutaneous Fat Loss: Temporal, Facial region:  Moderate-Severe (unable to assess further today due to patient wants to rest)  Muscle Loss: Temporal-moderate to severe (unable to assess further today)-previously noted per RD: \"Scapular bone, Thoracic region (clavicle, acromium bone, deltoid, trapezius, pectoral), Upper arm (bicep, tricep), Lower arm  (forearm):  Moderate-severe (per previous)\"  Fluid Accumulation/Edema: Mild  Malnutrition Diagnosis: Severe malnutrition in the context of acute on chronic illness    NUTRITION DIAGNOSIS  Inadequate oral intake related to poor appetite, early satiety AEB recent Calorie counts (9/19-9/21) show po intakes meeting 20-25% of assessed needs, requiring TF to meet majority of nutritional needs.     INTERVENTIONS  Implementation  Nutrition Education: Discussed po intakes and plan for Calorie Counts.  Discussed supplements.  Encouraged " outside food brought in if preferred.  Recommended small meals with snacks, supplements between.       Discussed pt report of abdominal pain with RN.      Goals  Total avg nutritional intake to meet a minimum of 30 kcal/kg and 1.5g/kg protein daily (per DW 60 kg).     Monitoring/Evaluation  Progress toward goals will be monitored and evaluated per protocol.    Tanisha Melchor RD, LD

## 2019-09-24 NOTE — PROGRESS NOTES
Confusion of Mycortic dosing. Was on 540 bid in hospital 2.2 nausea. However discharged on 720 bid.   D.w pharmacy. Will order 540 mg x 1 tonight.   Please confirm with the transplant team in am.     Sterling Meier MD   Hospitalist (Internal Medicine)  [facility] [place]   Pager: [phone]

## 2019-09-25 PROCEDURE — 25000132 ZZH RX MED GY IP 250 OP 250 PS 637: Performed by: PHYSICAL MEDICINE & REHABILITATION

## 2019-09-25 PROCEDURE — 97530 THERAPEUTIC ACTIVITIES: CPT | Mod: GP | Performed by: PHYSICAL THERAPIST

## 2019-09-25 PROCEDURE — 97110 THERAPEUTIC EXERCISES: CPT | Mod: GP | Performed by: PHYSICAL THERAPIST

## 2019-09-25 PROCEDURE — 12800006 ZZH R&B REHAB

## 2019-09-25 PROCEDURE — 97127 ZZHC SP THERAPEUTIC INTERVENTION W/FOCUS ON COGNITIVE FUNCTION,EA 15 MIN: CPT | Mod: GN | Performed by: SPEECH-LANGUAGE PATHOLOGIST

## 2019-09-25 PROCEDURE — 25000131 ZZH RX MED GY IP 250 OP 636 PS 637: Performed by: INTERNAL MEDICINE

## 2019-09-25 PROCEDURE — 25000132 ZZH RX MED GY IP 250 OP 250 PS 637: Performed by: GENERAL PRACTICE

## 2019-09-25 PROCEDURE — 97535 SELF CARE MNGMENT TRAINING: CPT | Mod: GO

## 2019-09-25 PROCEDURE — 25000131 ZZH RX MED GY IP 250 OP 636 PS 637: Performed by: HOSPITALIST

## 2019-09-25 PROCEDURE — 25000132 ZZH RX MED GY IP 250 OP 250 PS 637: Performed by: HOSPITALIST

## 2019-09-25 PROCEDURE — 99232 SBSQ HOSP IP/OBS MODERATE 35: CPT | Performed by: INTERNAL MEDICINE

## 2019-09-25 PROCEDURE — 25000128 H RX IP 250 OP 636: Performed by: HOSPITALIST

## 2019-09-25 RX ORDER — LIDOCAINE 4 G/G
2 PATCH TOPICAL
Status: DISCONTINUED | OUTPATIENT
Start: 2019-09-25 | End: 2019-09-27

## 2019-09-25 RX ADMIN — SIMETHICONE CHEW TAB 80 MG 80 MG: 80 TABLET ORAL at 07:48

## 2019-09-25 RX ADMIN — VANCOMYCIN HYDROCHLORIDE 125 MG: KIT at 07:47

## 2019-09-25 RX ADMIN — MYCOPHENOLIC ACID 720 MG: 360 TABLET, DELAYED RELEASE ORAL at 08:04

## 2019-09-25 RX ADMIN — ZINC SULFATE CAP 220 MG (50 MG ELEMENTAL ZN) 220 MG: 220 (50 ZN) CAP at 07:47

## 2019-09-25 RX ADMIN — VANCOMYCIN HYDROCHLORIDE 125 MG: KIT at 21:44

## 2019-09-25 RX ADMIN — VANCOMYCIN HYDROCHLORIDE 125 MG: KIT at 17:18

## 2019-09-25 RX ADMIN — MULTIVITAMIN 15 ML: LIQUID ORAL at 07:47

## 2019-09-25 RX ADMIN — HEPARIN SODIUM 5000 UNITS: 5000 INJECTION, SOLUTION INTRAVENOUS; SUBCUTANEOUS at 14:25

## 2019-09-25 RX ADMIN — LEVOTHYROXINE SODIUM 175 MCG: 50 TABLET ORAL at 07:47

## 2019-09-25 RX ADMIN — HEPARIN SODIUM 5000 UNITS: 5000 INJECTION, SOLUTION INTRAVENOUS; SUBCUTANEOUS at 21:54

## 2019-09-25 RX ADMIN — Medication 1 PACKET: at 08:05

## 2019-09-25 RX ADMIN — DAPSONE 100 MG: 100 TABLET ORAL at 07:47

## 2019-09-25 RX ADMIN — MAGNESIUM OXIDE TAB 400 MG (241.3 MG ELEMENTAL MG) 400 MG: 400 (241.3 MG) TAB at 07:47

## 2019-09-25 RX ADMIN — VANCOMYCIN HYDROCHLORIDE 125 MG: KIT at 11:30

## 2019-09-25 RX ADMIN — ASPIRIN 325 MG ORAL TABLET 325 MG: 325 PILL ORAL at 07:47

## 2019-09-25 RX ADMIN — HEPARIN SODIUM 5000 UNITS: 5000 INJECTION, SOLUTION INTRAVENOUS; SUBCUTANEOUS at 06:14

## 2019-09-25 RX ADMIN — OXYCODONE HYDROCHLORIDE AND ACETAMINOPHEN 500 MG: 500 TABLET ORAL at 07:47

## 2019-09-25 RX ADMIN — FAMOTIDINE 20 MG: 20 TABLET, FILM COATED ORAL at 07:48

## 2019-09-25 RX ADMIN — CYCLOSPORINE 225 MG: 100 SOLUTION ORAL at 21:44

## 2019-09-25 RX ADMIN — CYCLOSPORINE 225 MG: 100 SOLUTION ORAL at 08:04

## 2019-09-25 RX ADMIN — MELATONIN TAB 3 MG 3 MG: 3 TAB at 21:45

## 2019-09-25 RX ADMIN — MELATONIN 1000 UNITS: at 07:58

## 2019-09-25 RX ADMIN — MYCOPHENOLIC ACID 720 MG: 360 TABLET, DELAYED RELEASE ORAL at 21:45

## 2019-09-25 RX ADMIN — MELATONIN 1000 UNITS: at 21:44

## 2019-09-25 RX ADMIN — VALGANCICLOVIR 450 MG: 450 TABLET, FILM COATED ORAL at 07:47

## 2019-09-25 ASSESSMENT — MIFFLIN-ST. JEOR: SCORE: 1400.97

## 2019-09-25 NOTE — PROGRESS NOTES
"  [facility]   Acute Rehabilitation Unit  Daily progress note    interval history  Nicci Pemberton was seen and examined at bedside. Reports fatigue, poor sleep related to generalized discomfort, stomach pressure, back aches, non specific and \"nothing to fix\" per patient.  Appetite remains poor.  Frustrated with speech acknowledging \"the questions I should know but can't answer\".  Denies new concerns. Discussed ongoing intermittent sob suspected atelectasis, encouraged IS, deep breathing exercises.        medications    aspirin  325 mg Oral Daily     cycloSPORINE modified  225 mg Oral BID     dapsone  100 mg Oral Daily     famotidine  20 mg Oral Daily     heparin sodium  5,000 Units Subcutaneous Q8H     levothyroxine  175 mcg Oral Daily     magnesium oxide  400 mg Oral Daily     melatonin  3 mg Oral At Bedtime     multivitamins w/minerals  15 mL Per Feeding Tube Daily     mycophenolic acid  720 mg Oral BID     ondansetron  4 mg Oral QAM AC     protein modular  1 packet Per Feeding Tube Daily     valGANciclovir  450 mg Oral Daily     vancomycin  125 mg Oral 4x Daily     vitamin C  500 mg Oral Daily     vitamin D3  1,000 Units Oral BID     zinc sulfate  220 mg Oral Daily        acetaminophen, alum & mag hydroxide-simethicone, ondansetron, oxymetazoline, - MEDICATION INSTRUCTIONS -, polyethylene glycol, senna-docusate, simethicone, sodium chloride     physical exam  /62 (BP Location: Left arm)   Pulse 77   Temp 98.2  F (36.8  C) (Oral)   Resp 16   Ht 1.575 m (5' 2\")   Wt 87.3 kg (192 lb 6.4 oz)   SpO2 96%   BMI 35.19 kg/m    Gen: awake fatigued  HEENT: nasal feeding tube in place  Cardio: rrr  Pulm: non labored faint bibaislar crackles  Abd: soft reported fullness/pressure  Ext: ble in lymphedema wraps  Neuro/MSK: alert speech clear follows commands ambulating with fww with cga    labs  Lab Results   Component Value Date    WBC 4.9 09/23/2019     Lab Results   Component " Value Date    RBC 2.52 09/23/2019     Lab Results   Component Value Date    HGB 7.9 09/23/2019     Lab Results   Component Value Date    HCT 26.5 09/23/2019     Lab Results   Component Value Date     09/23/2019     Lab Results   Component Value Date    MCH 31.3 09/23/2019     Lab Results   Component Value Date    MCHC 29.8 09/23/2019     Lab Results   Component Value Date    RDW 19.3 09/23/2019     Lab Results   Component Value Date     09/23/2019     Last Comprehensive Metabolic Panel:  Sodium   Date Value Ref Range Status   09/24/2019 138 133 - 144 mmol/L Final     Potassium   Date Value Ref Range Status   09/24/2019 5.0 3.4 - 5.3 mmol/L Final     Chloride   Date Value Ref Range Status   09/24/2019 99 94 - 109 mmol/L Final     Carbon Dioxide   Date Value Ref Range Status   09/24/2019 33 (H) 20 - 32 mmol/L Final     Anion Gap   Date Value Ref Range Status   09/24/2019 6 3 - 14 mmol/L Final     Glucose   Date Value Ref Range Status   09/24/2019 94 70 - 99 mg/dL Final     Urea Nitrogen   Date Value Ref Range Status   09/24/2019 39 (H) 7 - 30 mg/dL Final     Creatinine   Date Value Ref Range Status   09/24/2019 0.96 0.52 - 1.04 mg/dL Final     GFR Estimate   Date Value Ref Range Status   09/24/2019 65 >60 mL/min/[1.73_m2] Final     Comment:     Non  GFR Calc  Starting 12/18/2018, serum creatinine based estimated GFR (eGFR) will be   calculated using the Chronic Kidney Disease Epidemiology Collaboration   (CKD-EPI) equation.       Calcium   Date Value Ref Range Status   09/24/2019 9.4 8.5 - 10.1 mg/dL Final         Rehabilitation - continue comprehensive acute inpatient rehabilitation program with multidisciplinary approach including therapies, rehab nursing, and physiatry following. See interval history for updates.      assessment and plan    Nicci Pemberton is a 59 year old woman with a past medical history of HTN, hypothyroidism, and end stage liver disease secondary to ANGULO and  heterozygous alpha-1 antitrypsin deficiency admitted to [facility]  in decompensated liver failure underwent liver transplant , admitted to  ARU for ongoing rehabilitation and medical management 2019.     ESLD 2/2 ANGULO & heterozygous alpha-1 antitrypsin deficiency s/p  donor liver transplant .  -appreciate ongoing assistance per transplant and hospitalist service  -no lifting >10 pounds  -transplant coordinator: Zina [phone]  F/u transplant surgeon, transplant hepatology  - CBC, CMP, mag, phos, and CSA levels (12 hours after administration)) to be drawn every Monday and Thursday   -Immunosuppression: myfortic, cyclosporine (goal 200-300)  -Prophylaxis: dapsone x 6 months, valacyclovir x 12 weeks  -ongoing ab pain- monitor     Toxic and metabolic encephalopathy- multifactorial: meds, sleep, illness, MRI brain 9/3 neg.  Evaluated by neurology  with workup. .  -continue to monitor  -SLP consult for cog eval  -delirium precautions ordered     C-Diff- +   -continue po vanco      Acute blood loss anemia- with anemia of chronic disease.  Last transfused .  Hgb 7.9 .  -trend Mon/Thurs     Severe Malnutrition - acute on chronic illness s/p ND  per IR.  Goal to taper off and discontinue TF prior to ARU discharge.   -TF 6p-6 a via ND ( on Nepro due to previous hyperkalemia)  -FW Q 4 hours  -appreciate nutrition consult  -marcie counts     Medical Device Related Pressure Injury (MDRPI) due to Bridle string  Pressure Injury  Stage 2   -wound care as ordered     Hypothyroidism- continue synthroid      1. Adjustment to disability:  frustrated  2. FEN: reg + TF with poor intake  3. Bowel: continent  4. Bladder: continent  5. DVT Prophylaxis: ambulation  6. GI Prophylaxis:ppi  7. Code: full  8. Disposition: home ~ 7 days   9. Follow up Appointments on Discharge: hepatology, transplant surgery      discussed with Dr. Brush  PM&R Staff Physician    Mary Alcantara PA-C  Physical Medicine  & Rehabilitation   Pager: [phone]

## 2019-09-25 NOTE — PLAN OF CARE
FOCUS/GOAL  Bowel management, Bladder management, Nutrition/Feeding/Swallowing precautions and Skin integrity       ASSESSMENT, INTERVENTIONS AND CONTINUING PLAN FOR GOAL:  Orientation: Pt is alert and oriented x 4  Bowel: Continent of bowel- LBM 19  Bladder: Continent of bladder via toilet  Pain: No c/o pain this shift  Ambulation/Transfers: Assist of 1 with walker/ gait belt  Diet/ Liquids: Regular diet/ thin liquids/ takes pills whole with water. Requests some of the liquid medication to be put down ND tube due to taste and intermittent nausea.  Tubes/ Lines/ Drains: ND tube with bridal  Tube Feedinml/hr of NEPRO with 30ml free water flushes Q4hrs. Tube feeding started at 1800. Pt tolerating well.  Vitals: O2 sats 85% at room air at 1551- writer rechecked at 1615, sats up to 91% on room air. Updated PA at 1615, Pt stated to encourage incentive spirometer and use supplemental O2 as needed. Will continue to monitor.  Skin: See flow sheet- small foam dressing placed on septum area to protect skin.    Bed alarm on for safety, call light within reach, Ok to continue with care plan.

## 2019-09-25 NOTE — CONSULTS
Late Entry (seen on 19 at 5:00pm)    Social Work: Initial Assessment with Discharge Plan    Patient Name: Nicci Pemberton  : 1960  Age: 59 year old  MRN: [id number]  Completed assessment with: Pt interview at bedside   Admitted to ARU: 2019    Presenting Information   Date of SW assessment: 2019  Health Care Directive: Health Care Directive Agent (if patient not able to make decisions)  Primary Health Care Agent: Pt    Secondary Health Care Agent: Pt two adult sons, NIYAH  Living Situation: Pt lives in a split-entry home with her . Two sons live in the [facility] area. TRISHA And 6 stairs to basement and 6 stairs to main level (kitchen and living area).   Previous Functional Status: Independent with ADLs and IADLs. Pt reported managing her own medications and finances. Pt drives PTA.   DME available: None reported   Patient and family understanding of hospitalization: Pt had a difficult time recalling the day of her surgery. She originally expressed  but when she was informed of the current date, pt expressed feeling as if her transplant was longer than that. After sharing with pt the date of her transplant, , she was relieved. Pt scored 15/15 on BIMs. Pt tearful and expressed anxious to return home and have NJ removed. Pt pleasant.   Cultural/Language/Spiritual Considerations: None. Has been seen by spiritual care.       Physical Health  Reason for admission: Liver transplant    Provider Information   Primary Care Physician:Wale Thurston --[phone]  : Tracy Almeida- inpatient      Mental Health/Chemical Dependency:   Diagnosis: Denied any hx of anxiety/depression. Reported 'depression now'. Pt became tearful and expressed desire to be home with her spouse. Pt sharing that she recently retired and anxious to be home and enjoy her correction with her . Support provided. Pt agreeable to spiritual care consult.   Alcohol/Tobacco/Narcotis:  Denied  Support/Services in Place: None voiced   Services Needed/Recommended: Spiritual care will follow. Support from staff and others.   Sexuality/Intimacy: No concerns voiced.     Support System  Marital Status:  for 37 years. Shared that her  is her 'HHC RN and outpt therapist, he takes care of me'.  is retired, able to assist and supervise 24/7.   Family support: [place]. Reported good relationship with her children. From a blended family, reported 7 siblings between two different marriages with her parents. Reports good support from extended family.   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   Income Source: Social Security Income  Education: High school and some college   Financial Concerns:  None  Insurance: [employer]      Discharge Plan   Patient and family discharge goal: Home with family A and HHC (due to transplant)   Provided Education on discharge plan: YES  Patient agreeable to discharge plan:  Would like to discuss HHC with spouse before agreeing   Provided education and attained signature for Medicare IM and IRF Patient Rights and Privacy Information provided to patient : N/A  Provided patient with [facility] Resources: N/A  Barriers to discharge: No barriers identified     Discharge Recommendations   Disposition: Home with family A and HHC (due to transplant)   Transportation Needs: Family can assist  Name of Transportation Company and Phone: N/A    Additional comments   Assessment completed. Pt had a difficult time recalling the day of her surgery. She originally expressed 9/18 but when she was informed of the current date, pt expressed feeling as if her transplant was longer than that. After sharing with pt the date of her  transplant, 8/18, she was relieved. Pt scored 15/15 on BIMs. Pt tearful and expressed anxious to return home and have NJ removed. Pt pleasant. Pt reports good support from  and family.     Pt will discharge home with  and [facility] services. Discussed discharge recommendations with pt, pt would like to discuss with spouse before making any further plans or decisions. SWer will continue to follow and remain available as needed.     Please invite to Care Conference:  Pt spouse and two adult children (need to follow up with pt)      09/25/19 1300   Living Arrangements   Lives With spouse   Living Arrangements house   Able to Return to Prior Arrangements yes   Living Arrangement Comments Split entry home with TRISHA. 6 stairs to basement and 6 to main level    Home Safety   Patient Feels Safe Living in Home? yes   Discharge Planning   Expected Discharge Date 10/01/19   Patient/Family Anticipates Transition to home with family;home with help/services   Disposition Comments [facility] recommended    Concerns to be Addressed no discharge needs identified   Discharge Needs Assessment   Transportation Anticipated family or friend will provide     WANDA Turner, [facility] Acute Rehab Unit   Phone: [phone]  I   Pager: [phone]

## 2019-09-25 NOTE — PLAN OF CARE
OT: pt completed full shower and ADL routine this AM - see FIM for functional performance.      ELOS - ~7days ; will need to discuss DME/AE needs for pt returning home  Barriers to returning home : impaired cognition/memory, Fatigue/endurance

## 2019-09-25 NOTE — PLAN OF CARE
PT: Pt continues to need cues for safety with fww with transfers, and to avoid obstacles at times with ambulation, pt amb about 100 ft x 3 reps sba today, moncada by knee pain and overall fatigue.

## 2019-09-25 NOTE — PLAN OF CARE
FOCUS/GOAL  Medical management    ASSESSMENT, INTERVENTIONS AND CONTINUING PLAN FOR GOAL:  Patient slept well between cares. No c/o pain, only c/o feeling fatigued. No SOB, O2 sat ranging from low to upper 80's, allpied O2 at 2L per nasal cannula, O2 sat increased to to 96%. Around 0100, she used her call light appropriately to call for assistance. Around 0415, staff heard her calling out for assistance, she said she couldn't reach the call light which was within her reach, same place as earlier, on her left side, next to the bed rail. The call light was moved to her right side were she can easier reach it. She transferred and ambulated to the bathroom with CGA and IV pole, she needed total assistance with jessi cares, minimal assistance with brief management

## 2019-09-25 NOTE — PLAN OF CARE
SLP: -30, pt needed eat breakfast (her board said 8:00 meal each day, but scheduled over).  Clarified with schedulers to block her 8:00-8:45 for breakfast daily (per her request).

## 2019-09-25 NOTE — PROGRESS NOTES
Nutrition Services Progress Note    Calorie Count  (9/24)  Did not eat breakfast per meal record.  Intake at other meals not available.      Tanisha Melchor RD, LD

## 2019-09-25 NOTE — PLAN OF CARE
Focus: Physio  D: Doing well this morning. Lungs are clear but a bit diminished. 02 sats 93% after using incentive spirometer and coughing and deep breathing. Denies any chest pain or shortness of breath. Up in the shower with OT. P: Continue current plan of care.     15:00 Off oxygen all day. Sats 92-96% on room air. Aggressive use of incentive spirometer and cough and deep breath. P: Continue to monitor.

## 2019-09-25 NOTE — CONSULTS
Health Psychology                  Clinic    Department of Medicine  Genie Galeano, Ph.D., L.P. [phone]                          Clinics and Surgery Center  [facility] Sonya Solorio, Ph.D.,  L.P. [phone]                 3rd Floor  [place] Mail Code 749   Amanda Mon, Ph.D., L.P. [phone] [address] Jennifer Hurd, Ph.D., L.P. [phone]            [place] [address]          Shaka Nieto, Ph.D., A.B.BANDAR.P., L.P. [phone]      Radha Camacho, Ph.D., L.P. [phone]      Inpatient Health Psychology Consultation*    DOS:  09/24/2019     REFERRAL SOURCE:  JESSE o[facility]ue, Acute Rehabilitation Center, [facility].      REASON FOR REFERRAL:  Nicci Pemberton is a 59-year-old woman currently on the Acute Rehabilitation Center.  Ms. Pemberton received a liver transplant on 08/18/2019 secondary to ANGULO and alpha-1 antitrypsin deficiency.  She has had a prolonged hospital course including delirium and metabolic encephalopathy.  On admission to the [facility] yesterday, Ms. Pemberton requested contact with Psychology for additional support.  This evaluation was requested to assess her current emotional status and to make treatment recommendations.      HISTORY OF PRESENT ILLNESS:  Ms. Pemberton was very welcoming of this contact.  Her  was present initially, but stated that he was leaving so that she and I could speak alone.  Ms. Pemberton did not appear to have a specific agenda for the conversation, but did recall that she had requested contact for support.  She reports that she has no premorbid history of depression, and that her mood was typically positive.  At the same time, she acknowledges a significant history of anxiety.  She describes that she believes anxiety began when she became pregnant with her first child, and has continued and intensified since that time.  She believes  that she worries about a variety of different issues.  She does not have a strong sense that anxious rumination affects her ability to initiate sleep.  However, she reports that it has very significant effects on her ability to concentrate, on physical tension that she perceives in her body, and on irritability with others.  She notes that she is aware of her tendency to be irritable herself and has also received feedback from others.      Currently, Ms. Pemberton acknowledges that this long hospitalization has been difficult.  She notes that the initial diagnosis with ANGULO in 2018 was shocking and surprising.  Her health did remain fairly stable for quite some time until a fairly rapid decompensation earlier this summer that led to her transplant on 08/2018.  She is aware that she continues to struggle with some cognitive functions, and this is frustrating although she is also aware that this will continue to improve.  She talks about the excellent support that she receives from her family and friends.      Ms. Pemberton reports that she is having difficulty with sleep initiation, but, again, does not believe that anxious rumination is a factor.  She cites difficulty becoming comfortable physically, noting both ongoing GI distress as well as discomfort of having a feeding tube that she can feel in her nose and throat.  She has little to no appetite, as well as feeling bloated and having GI distress.  However, she is aware of the importance of nutrition to assist her in the healing process, and is attempting to eat by mouth as she is able to.  As she has just engaged in her initial rehabilitation evaluations today, she is unable to evaluate whether or not she sees herself making progress in her rehabilitation therapies, although she notes that she is much more able to function independently  than she was over the past several weeks.      Although Ms. Pemberton was informed that I will be out of town and unavailable for the  next week plus, she stated her belief that this conversation has helped set her mind at ease, and that she does not believe she will require additional sources of support through this [facility] admission.  At the same time, she was informed that support is available through  [employer] and through our [facility] , Nona Brown.      SOCIAL HISTORY:  Ms. Pemberton is the youngest of 3 children, and grew up in the [facility].  She and her , Keny, have 2 sons, now 29 and 30, with whom she remains very close.  Ms. 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  She was raised in the [employer] Jew, but became very disenchanted with the Jew as information about abuse by priests became more in the public domain and she left the Jew, stating that she has her own internal charlotte and also receives support from her family.  She denies having close friends, again, stating that she receives most of her support from family.      MEDICAL HISTORY:  Please see Ms. Pemberton' [facility] electronic medical record for more complete information on her medical history, current admission and status, and all medications.      PSYCHIATRIC HISTORY:  As above in HPI.  Ms. Pemberton reports no significant psychiatric history, but does acknowledge living with symptoms of generalized anxiety since she became a parent.  No other significant psychiatric history was available at the time of this evaluation.      BEHAVIORAL OBSERVATIONS.  Ms. Pemberton presented for this evaluation sitting up in her room on the [facility] between scheduled rehabilitation therapies.  She was quite fatigued, and exhibited a primarily fatigued and euthymic affect.  She reported her mood as anxious and more overwhelmed than usual.  Speech was fairly clear and coherent, but rather slow in response time.  She did answer all questions as fully as possible, but exhibited some difficulty with  memory for specific chronological events, names and dates.  Insight and judgment appear to be grossly intact, with some impairment related to ongoing encephalopathy that is improving.  She exhibited no evidence of distortions of thought or perception.      IMPRESSIONS AND PLAN:  Earlene Pemberton is a 59-year-old woman currently on the Acute [facility] Center following liver transplantation on 2019 secondary to ANGULO.  Ms. Pemberton has a history of what appears to fit the diagnosis of generalized anxiety disorder.  However, she has never been treated for this and does not describe that it has become a great obstacle within her personal relationships.  She does acknowledge that she has received feedback about becoming too irritable with others, as well as anxious thinking affecting her concentration as well as leading to increased muscle tension and aches.  She is motivated to engage in rehabilitation work to allow her to return home safely.  She felt that our conversation today was helpful, and is aware that spiritual health services and social work are available for support during my upcoming absence.      DIAGNOSES:   1.  Generalized anxiety disorder (F41.1).   2.  Metabolic encephalopathy (G93.41).         ANNELIESE ZULUAGA, PHD, LP         *In accordance with the Rules of the [place] Board of [employer], it is noted that psychological descriptions and scientific procedures underlying psychological evaluations have limitations.  Absolute predictions cannot be made based on information in this report.        D: 2019   T: 2019   MT:       Name:     EARLENE PEMBERTON   MRN:      [id number]        Account:       [id number]   :      1960           Consult Date:  2019      Document: [id number]

## 2019-09-25 NOTE — PROGRESS NOTES
General acute hospital, [facility] Progress Note - Hospitalist Service       Date of Admission:  2019  Assessment & Plan       Nicci Pemberton is a 59 year old woman with a history of  ANGULO cirrhosis complicated by spontaneous bacterial peritonitis who presented in acute decompensated liver failure on 2019.  Underwent  donor liver transplant 2019.  Post operative course complicated by toxic and metabolic encephalopathy, C-Diff diarrhea, hyperkalemia, steroid induced hyperglycemia,  impaired oral intake, nausea, epistaxis,  acute blood loss anemia, and  Weakness. Extensive work up done for encephalopathy. It has been slowly resolving  She is on oral vancomycin for cdiff. She is transferred to rehab.       ANGULO, Heterozygote for Alpha 1 Antitrypsin Deficiency, S/P Liver Transplant on 19:   Graft functioning well.  Mild increase in LFT's  On the discharge day and hence Mycophenolate was increased from 500 BID to 720  Immunosuppression: mycophenolate (Myfortic 720mg BID) Cyclosporine 225 mg BID.  Tacrolimus was discontinued on  due to prolonged delirium. CSA  level goal 200-300 (12 hour trough).  Level  at  282  Prophylaxis: dapsone (x6 months), valganciclovir (x12 weeks), Mycelex completed inpatient.    Follow up: Transplant team   Mild raise in t bili at 1.5 and alkaline phosphatase at 251. Recheck on   Transplant coordinator Zina [phone].  Biliary stent:  No stent on AXR 19  Donor status:  DBD  CMV D- / R +  EBV D + / R +     2. C Diff Colitis:   Started on oral Vanc on 19. Recommend at least two weeks treatment given immunocompromised state.    Monitor stools. Currently does not have diarrhea      3. Toxic and Metabolic Encephalopathy: ( Resolved)    Has resolved. Tacro stopped and several days later encephalopathy improved.  Was seen by neurology, had work up done in patient at [facility].    4. Acute blood loss anemia on Anemia of  chronic disease. Hb 7.9.   Has low normal percent saturation and low serum iron. There could be additional iron deficiency given recurrent blood draws and poor nutrition. Can consider adding daily iron 325 mg po daily. Folate, Vit B 12 was fine.   Labs every M/Thu      5. Hypothyroidism: Ct synthroid    6. Severe malnutrition: On TF, Tx  per primary team.      7. Hypoxia:    Overnight, patient was put on 2 lts of Oxygen. No SOB/ Cough or fever  Likely secondary to atelectasis   Advised patient to use incentive spirometry  Specifically advised both patient and bedside RN that our goal would be to wean off Oxygen today       Diet: Adult Formula Drip Feeding: Continuous Nepro; Nasoduodenal tube; Goal Rate: 65; mL/hr; From: 6:00 PM; 6:00 AM; Medication - Feeding Tube Flush Frequency: At least 15-30 mL water before and after medication administration and with tube clogging; No  Regular Diet Adult  Snacks/Supplements Adult: Nepro Oral Supplement; Between Meals  Room Service  Calorie Counts  Room Service    DVT Prophylaxis: Heparin SQ  Forrester Catheter: not present  Code Status: Full Code      Disposition Plan   Expected discharge:  To be determined by primary team   Entered: Griffin Knight MD 09/25/2019, 8:39 AM       The patient's care was discussed with the Patient and Primary team as well as transplant team     Griffin Knight MD  Hospitalist Service  [facility], [place]    ______________________________________________________________________    Interval History   Feels Ok this morning. Continues to have stomach fullness preventing her from taking much orally  Denies fevers or chills   Advised to use the incentive spirometer        Data reviewed today: I reviewed all medications, new labs and imaging results over the last 24 hours. I personally reviewed no images or EKG's today.    Physical Exam   Vital Signs: Temp: 98.2  F (36.8  C) Temp src: Oral BP: 106/62  Pulse: 77   Resp: 16 SpO2: 92 % O2 Device: None (Room air) Oxygen Delivery: 2 LPM  Weight: 192 lbs 6.4 oz  General Appearance: Awake, alert and not in distress   Respiratory: Clear breath sounds bilaterally   Cardiovascular: Normal heart sounds   GI: Soft, non tender. Liver transplant incision scar appears to be healing well   Skin: No bruising / bleeding   Other: Bilateral lower extremity edema 2+    Data   Recent Labs   Lab 09/24/19  1106 09/23/19  0602 09/22/19  0711 09/21/19  0655   WBC  --  4.9 4.8 4.6   HGB  --  7.9* 7.7* 7.8*   MCV  --  105* 104* 103*   PLT  --  213 207 195    138 138 134   POTASSIUM 5.0 4.7 4.8 4.7   CHLORIDE 99 103 102 99   CO2 33* 31 31 29   BUN 39* 36* 35* 35*   CR 0.96 0.78 0.76 0.80   ANIONGAP 6 4 6 7   JACOB 9.4 9.3 9.2 9.0   GLC 94 104* 96 90   ALBUMIN 2.7* 2.3* 2.3* 2.3*   PROTTOTAL 6.3* 5.6* 5.6* 5.8*   BILITOTAL 1.5* 1.0 1.2 1.2   ALKPHOS 251* 230* 216* 222*   ALT 61* 57* 50 47   AST 51* 52* 48* 42

## 2019-09-26 LAB
ALBUMIN SERPL-MCNC: 2.4 G/DL (ref 3.4–5)
ALP SERPL-CCNC: 222 U/L (ref 40–150)
ALT SERPL W P-5'-P-CCNC: 53 U/L (ref 0–50)
ANION GAP SERPL CALCULATED.3IONS-SCNC: 8 MMOL/L (ref 3–14)
AST SERPL W P-5'-P-CCNC: 44 U/L (ref 0–45)
BILIRUB DIRECT SERPL-MCNC: 0.7 MG/DL (ref 0–0.2)
BILIRUB SERPL-MCNC: 1.3 MG/DL (ref 0.2–1.3)
BUN SERPL-MCNC: 35 MG/DL (ref 7–30)
CALCIUM SERPL-MCNC: 8.7 MG/DL (ref 8.5–10.1)
CHLORIDE SERPL-SCNC: 102 MMOL/L (ref 94–109)
CO2 SERPL-SCNC: 29 MMOL/L (ref 20–32)
CREAT SERPL-MCNC: 0.86 MG/DL (ref 0.52–1.04)
CYCLOSPORINE BLD LC/MS/MS-MCNC: 271 UG/L (ref 50–400)
ERYTHROCYTE [DISTWIDTH] IN BLOOD BY AUTOMATED COUNT: 20.5 % (ref 10–15)
GFR SERPL CREATININE-BSD FRML MDRD: 73 ML/MIN/{1.73_M2}
GLUCOSE SERPL-MCNC: 82 MG/DL (ref 70–99)
HCT VFR BLD AUTO: 27.2 % (ref 35–47)
HGB BLD-MCNC: 8.2 G/DL (ref 11.7–15.7)
MAGNESIUM SERPL-MCNC: 1.7 MG/DL (ref 1.6–2.3)
MCH RBC QN AUTO: 31.4 PG (ref 26.5–33)
MCHC RBC AUTO-ENTMCNC: 30.1 G/DL (ref 31.5–36.5)
MCV RBC AUTO: 104 FL (ref 78–100)
PHOSPHATE SERPL-MCNC: 4.9 MG/DL (ref 2.5–4.5)
PLATELET # BLD AUTO: 200 10E9/L (ref 150–450)
POTASSIUM SERPL-SCNC: 4.6 MMOL/L (ref 3.4–5.3)
PROT SERPL-MCNC: 5.7 G/DL (ref 6.8–8.8)
RBC # BLD AUTO: 2.61 10E12/L (ref 3.8–5.2)
SODIUM SERPL-SCNC: 139 MMOL/L (ref 133–144)
TME LAST DOSE: NORMAL H
WBC # BLD AUTO: 4.5 10E9/L (ref 4–11)

## 2019-09-26 PROCEDURE — 97116 GAIT TRAINING THERAPY: CPT | Mod: GP | Performed by: PHYSICAL THERAPIST

## 2019-09-26 PROCEDURE — 25000131 ZZH RX MED GY IP 250 OP 636 PS 637: Performed by: PHYSICAL MEDICINE & REHABILITATION

## 2019-09-26 PROCEDURE — 25000131 ZZH RX MED GY IP 250 OP 636 PS 637: Performed by: HOSPITALIST

## 2019-09-26 PROCEDURE — 80048 BASIC METABOLIC PNL TOTAL CA: CPT | Performed by: PHYSICIAN ASSISTANT

## 2019-09-26 PROCEDURE — 84100 ASSAY OF PHOSPHORUS: CPT | Performed by: PHYSICIAN ASSISTANT

## 2019-09-26 PROCEDURE — 25000132 ZZH RX MED GY IP 250 OP 250 PS 637: Performed by: GENERAL PRACTICE

## 2019-09-26 PROCEDURE — 25000131 ZZH RX MED GY IP 250 OP 636 PS 637: Performed by: INTERNAL MEDICINE

## 2019-09-26 PROCEDURE — 25000132 ZZH RX MED GY IP 250 OP 250 PS 637: Performed by: PHYSICAL MEDICINE & REHABILITATION

## 2019-09-26 PROCEDURE — 36415 COLL VENOUS BLD VENIPUNCTURE: CPT | Performed by: PHYSICIAN ASSISTANT

## 2019-09-26 PROCEDURE — 97530 THERAPEUTIC ACTIVITIES: CPT | Mod: GP | Performed by: PHYSICAL THERAPIST

## 2019-09-26 PROCEDURE — 83735 ASSAY OF MAGNESIUM: CPT | Performed by: PHYSICIAN ASSISTANT

## 2019-09-26 PROCEDURE — 97110 THERAPEUTIC EXERCISES: CPT | Mod: GP | Performed by: PHYSICAL THERAPIST

## 2019-09-26 PROCEDURE — 97535 SELF CARE MNGMENT TRAINING: CPT | Mod: GO | Performed by: STUDENT IN AN ORGANIZED HEALTH CARE EDUCATION/TRAINING PROGRAM

## 2019-09-26 PROCEDURE — 25000132 ZZH RX MED GY IP 250 OP 250 PS 637: Performed by: HOSPITALIST

## 2019-09-26 PROCEDURE — 97127 ZZHC SP THERAPEUTIC INTERVENTION W/FOCUS ON COGNITIVE FUNCTION,EA 15 MIN: CPT | Mod: GN | Performed by: SPEECH-LANGUAGE PATHOLOGIST

## 2019-09-26 PROCEDURE — 80158 DRUG ASSAY CYCLOSPORINE: CPT | Performed by: PHYSICIAN ASSISTANT

## 2019-09-26 PROCEDURE — 99232 SBSQ HOSP IP/OBS MODERATE 35: CPT | Performed by: INTERNAL MEDICINE

## 2019-09-26 PROCEDURE — 80076 HEPATIC FUNCTION PANEL: CPT | Performed by: PHYSICIAN ASSISTANT

## 2019-09-26 PROCEDURE — 12800006 ZZH R&B REHAB

## 2019-09-26 PROCEDURE — 25000128 H RX IP 250 OP 636: Performed by: HOSPITALIST

## 2019-09-26 PROCEDURE — 85027 COMPLETE CBC AUTOMATED: CPT | Performed by: PHYSICIAN ASSISTANT

## 2019-09-26 PROCEDURE — 97127 ZZHC SP THERAPEUTIC INTERVENTION W/FOCUS ON COGNITIVE FUNCTION,EA 15 MIN: CPT | Mod: GN | Performed by: REHABILITATION PRACTITIONER

## 2019-09-26 RX ORDER — CYCLOSPORINE 100 MG/ML
225 SOLUTION ORAL 2 TIMES DAILY
Status: DISCONTINUED | OUTPATIENT
Start: 2019-09-26 | End: 2019-09-26

## 2019-09-26 RX ORDER — MULTIPLE VITAMINS W/ MINERALS TAB 9MG-400MCG
1 TAB ORAL DAILY
Status: DISCONTINUED | OUTPATIENT
Start: 2019-09-27 | End: 2019-10-01 | Stop reason: HOSPADM

## 2019-09-26 RX ORDER — ALUMINA, MAGNESIA, AND SIMETHICONE 2400; 2400; 240 MG/30ML; MG/30ML; MG/30ML
30 SUSPENSION ORAL EVERY 4 HOURS PRN
Status: DISCONTINUED | OUTPATIENT
Start: 2019-09-26 | End: 2019-10-01 | Stop reason: HOSPADM

## 2019-09-26 RX ORDER — MULTIPLE VITAMINS W/ MINERALS TAB 9MG-400MCG
1 TAB ORAL DAILY
Status: DISCONTINUED | OUTPATIENT
Start: 2019-09-26 | End: 2019-09-26

## 2019-09-26 RX ADMIN — HEPARIN SODIUM 5000 UNITS: 5000 INJECTION, SOLUTION INTRAVENOUS; SUBCUTANEOUS at 21:45

## 2019-09-26 RX ADMIN — MELATONIN TAB 3 MG 3 MG: 3 TAB at 21:41

## 2019-09-26 RX ADMIN — MAGNESIUM OXIDE TAB 400 MG (241.3 MG ELEMENTAL MG) 400 MG: 400 (241.3 MG) TAB at 08:51

## 2019-09-26 RX ADMIN — HEPARIN SODIUM 5000 UNITS: 5000 INJECTION, SOLUTION INTRAVENOUS; SUBCUTANEOUS at 05:35

## 2019-09-26 RX ADMIN — CYCLOSPORINE 225 MG: 100 CAPSULE ORAL at 10:29

## 2019-09-26 RX ADMIN — OXYCODONE HYDROCHLORIDE AND ACETAMINOPHEN 500 MG: 500 TABLET ORAL at 08:51

## 2019-09-26 RX ADMIN — LEVOTHYROXINE SODIUM 175 MCG: 50 TABLET ORAL at 08:50

## 2019-09-26 RX ADMIN — MELATONIN 1000 UNITS: at 08:51

## 2019-09-26 RX ADMIN — DAPSONE 100 MG: 100 TABLET ORAL at 08:51

## 2019-09-26 RX ADMIN — MYCOPHENOLIC ACID 720 MG: 360 TABLET, DELAYED RELEASE ORAL at 08:51

## 2019-09-26 RX ADMIN — MULTIPLE VITAMINS W/ MINERALS TAB 1 TABLET: TAB at 10:29

## 2019-09-26 RX ADMIN — FAMOTIDINE 20 MG: 20 TABLET, FILM COATED ORAL at 08:51

## 2019-09-26 RX ADMIN — VANCOMYCIN HYDROCHLORIDE 125 MG: KIT at 08:57

## 2019-09-26 RX ADMIN — ONDANSETRON 4 MG: 4 TABLET, ORALLY DISINTEGRATING ORAL at 13:51

## 2019-09-26 RX ADMIN — MYCOPHENOLIC ACID 720 MG: 360 TABLET, DELAYED RELEASE ORAL at 21:40

## 2019-09-26 RX ADMIN — ASPIRIN 325 MG ORAL TABLET 325 MG: 325 PILL ORAL at 08:51

## 2019-09-26 RX ADMIN — SIMETHICONE CHEW TAB 80 MG 80 MG: 80 TABLET ORAL at 18:42

## 2019-09-26 RX ADMIN — ZINC SULFATE CAP 220 MG (50 MG ELEMENTAL ZN) 220 MG: 220 (50 ZN) CAP at 08:51

## 2019-09-26 RX ADMIN — MELATONIN 1000 UNITS: at 21:40

## 2019-09-26 RX ADMIN — CYCLOSPORINE 225 MG: 100 CAPSULE ORAL at 18:19

## 2019-09-26 RX ADMIN — HEPARIN SODIUM 5000 UNITS: 5000 INJECTION, SOLUTION INTRAVENOUS; SUBCUTANEOUS at 13:52

## 2019-09-26 RX ADMIN — VALGANCICLOVIR 450 MG: 450 TABLET, FILM COATED ORAL at 08:51

## 2019-09-26 NOTE — PLAN OF CARE
FOCUS/GOAL  Medical management    ASSESSMENT, INTERVENTIONS AND CONTINUING PLAN FOR GOAL:  Patient forgot to call for assistance, she set off bed alarm, found her walking to the BR with a walker. She later called for assistance. She transferred ans ambulated with SBA to CGA with a walker. She was incontinent of urine once. O2 sat was 92% on room air. Encouraged her to use IS, cough and deep breathe. Continue bed/chair alarm.   No c/o pain. She has a ND tube for TF. It has been plugged since yesterday. A decision will be made today to unplug it, replace it or leave it out.

## 2019-09-26 NOTE — PROGRESS NOTES
Nutrition Services    Calorie Counts  9/25: 57 calories and 2 g protein (50% of cream of wheat, no lunch or dinner recorded)    Per chart review pt's feeding tube is currently clogged.    Ranjana Rehman RD, LD  ARU RD pager: [phone]

## 2019-09-26 NOTE — PROGRESS NOTES
Immunosuppression Note:     Myfortic 720 mg BID   mg BID. 9/26 level 271 (9 hour trough). Continue current dose and levels every Monday and Thursday. Please obtain accurate trough on Monday. Goal 200-300.     Juliet Philippe PA-C

## 2019-09-26 NOTE — PLAN OF CARE
PT: Pt is close to Wendy with fww in room , assess on Friday.  Tech is looking for a different recliner that pt can manage legrest on her own. Pt able to climb 16 stairs with 2 rails, sba.  COnt toward goals.  Pt happy that NG tube is out today.

## 2019-09-26 NOTE — PROGRESS NOTES
09/26/19 1702   Signing Clinician's Name / Credentials   Signing clinician's name / credentials Radha Martinez MS/CCC-SLP   Quick Adds   Rehab Discipline SLP   Additional Documentation   SLP Plan SLP: complete Rbans or visual auditory learning on [employer] - continue with memory and reasoning goals   Total Session Time   Total Session Time (minutes) 25 minutes   ARC or TCU Only   What unit is patient on? Acute Rehab   SLP - Acute Rehab Center Time   Individual Time (minutes) - enter zero if not applicable - SLP 25  (25 cognitive skills)   Group Time (minutes) - enter zero if not applicable  - SLP 0   Concurrent Time (minutes) - enter zero if not applicable  - SLP 0   Co-Treatment Time (minutes) - enter zero if not applicable  - SLP 0   ARC Total Session Time (minutes) - SLP 25   Problem Solving (FIM)   Functional Performance Needs help to solve routine problems less than 10% of the time   Problem Solving Comment Completed [employer] verbal analogies- pt scored a raw score of 17 and percentile rank of 43rd percentile which is a low average score on this test   FIM Score 5- Supervision or Setup

## 2019-09-26 NOTE — PROGRESS NOTES
"  [facility]   Acute Rehabilitation Unit  Daily progress note    interval history  Nicci Pemberton was seen sitting up in chair, tf clogged overnight and did not sleep due to interruptions, rn trying to unclog.  Appetite remains poor with feelings of abdominal fullness and discomfort. Tearful about feeding tube, and eating but would like to continue discussion as to whether to trial it out.   No n/v/,  Having bowel movements.  Breathing ok, no urinary concerns, therapy going ok.      Per PT: Pt continues to need cues for safety with fww with transfers, and to avoid obstacles at times with ambulation, pt amb about 100 ft x 3 reps sba today, mnocada by knee pain and overall fatigue.     medications    aspirin  325 mg Oral Daily     cycloSPORINE modified  225 mg Oral BID IS     dapsone  100 mg Oral Daily     famotidine  20 mg Oral Daily     heparin sodium  5,000 Units Subcutaneous Q8H     levothyroxine  175 mcg Oral Daily     lidocaine  2 patch Transdermal Q24h    And     lidocaine   Transdermal Q24H    And     lidocaine   Transdermal Q8H     magnesium oxide  400 mg Oral Daily     melatonin  3 mg Oral At Bedtime     multivitamin w/minerals  1 tablet Per Feeding Tube Daily     mycophenolic acid  720 mg Oral BID     ondansetron  4 mg Oral QAM AC     protein modular  1 packet Per Feeding Tube Daily     valGANciclovir  450 mg Oral Daily     vitamin C  500 mg Oral Daily     vitamin D3  1,000 Units Oral BID     zinc sulfate  220 mg Oral Daily        acetaminophen, alum & mag hydroxide-simethicone, ondansetron, oxymetazoline, - MEDICATION INSTRUCTIONS -, polyethylene glycol, senna-docusate, simethicone, sodium chloride     physical exam  /64 (BP Location: Left arm)   Pulse 83   Temp 97.3  F (36.3  C) (Oral)   Resp 16   Ht 1.575 m (5' 2\")   Wt 87.3 kg (192 lb 6.4 oz)   SpO2 95%   BMI 35.19 kg/m    Gen: awake fatigued  HEENT: nasal feeding tube in place  Cardio: rrr  Pulm: non " labored faint bibaislar crackles  Abd: soft reported fullness/pressure  Ext: ble in lymphedema wraps  Neuro/MSK: alert speech clear follows commands ambulating with fww with cga    labs  Lab Results   Component Value Date    WBC 4.5 09/26/2019     Lab Results   Component Value Date    RBC 2.61 09/26/2019     Lab Results   Component Value Date    HGB 8.2 09/26/2019     Lab Results   Component Value Date    HCT 27.2 09/26/2019     Lab Results   Component Value Date     09/26/2019     Lab Results   Component Value Date    MCH 31.4 09/26/2019     Lab Results   Component Value Date    MCHC 30.1 09/26/2019     Lab Results   Component Value Date    RDW 20.5 09/26/2019     Lab Results   Component Value Date     09/26/2019       Last Comprehensive Metabolic Panel:  Sodium   Date Value Ref Range Status   09/26/2019 139 133 - 144 mmol/L Final     Potassium   Date Value Ref Range Status   09/26/2019 4.6 3.4 - 5.3 mmol/L Final     Chloride   Date Value Ref Range Status   09/26/2019 102 94 - 109 mmol/L Final     Carbon Dioxide   Date Value Ref Range Status   09/26/2019 29 20 - 32 mmol/L Final     Anion Gap   Date Value Ref Range Status   09/26/2019 8 3 - 14 mmol/L Final     Glucose   Date Value Ref Range Status   09/26/2019 82 70 - 99 mg/dL Final     Urea Nitrogen   Date Value Ref Range Status   09/26/2019 35 (H) 7 - 30 mg/dL Final     Creatinine   Date Value Ref Range Status   09/26/2019 0.86 0.52 - 1.04 mg/dL Final     GFR Estimate   Date Value Ref Range Status   09/26/2019 73 >60 mL/min/[1.73_m2] Final     Comment:     Non  GFR Calc  Starting 12/18/2018, serum creatinine based estimated GFR (eGFR) will be   calculated using the Chronic Kidney Disease Epidemiology Collaboration   (CKD-EPI) equation.       Calcium   Date Value Ref Range Status   09/26/2019 8.7 8.5 - 10.1 mg/dL Final         Rehabilitation - continue comprehensive acute inpatient rehabilitation program with multidisciplinary approach  including therapies, rehab nursing, and physiatry following. See interval history for updates.      assessment and plan    Nicci Pemberton is a 59 year old woman with a past medical history of HTN, hypothyroidism, and end stage liver disease secondary to ANGULO and heterozygous alpha-1 antitrypsin deficiency admitted to [facility]  in decompensated liver failure underwent liver transplant , admitted to  ARU for ongoing rehabilitation and medical management 2019.     ESLD 2/2 ANGULO & heterozygous alpha-1 antitrypsin deficiency s/p  donor liver transplant . LFTs don trending  T bili WNL, , ALT 53 AST 44  -appreciate ongoing assistance per transplant and hospitalist service  -no lifting >10 pounds  -transplant coordinator: Zina [phone]  F/u transplant surgeon, transplant hepatology  - CBC, CMP, mag, phos, and CSA levels (12 hours after administration)) to be drawn every Monday and Thursday   -Immunosuppression: myfortic, cyclosporine (goal 200-300)  -Prophylaxis: dapsone x 6 months, valacyclovir x 12 weeks  -ongoing ab pain- monitor     Toxic and metabolic encephalopathy- multifactorial: meds, sleep, illness, MRI brain 9/3 neg.  Evaluated by neurology  with workup. .  -continue to monitor  -SLP consult for cog eval  -delirium precautions ordered     C-Diff- +   -continue po vanco      Acute blood loss anemia- with anemia of chronic disease.  Last transfused .  Hgb stable 8.2    -trend Mon/Thurs     Severe Malnutrition - acute on chronic illness s/p ND  per IR.  Goal to taper off and discontinue TF prior to ARU discharge. Clogged  may decide to remove and trial po intake- pending resolution of clog today  -TF 6p-6 a via ND ( on Nepro due to previous hyperkalemia)  -FW Q 4 hours  -appreciate nutrition consult  -marcie counts  -encourage po intake     Medical Device Related Pressure Injury (MDRPI) due to Bridle string  Pressure Injury  Stage 2   -wound care as  ordered     Hypothyroidism- continue synthroid      1. Adjustment to disability:  frustrated  2. FEN: reg + TF with poor intake  3. Bowel: continent  4. Bladder: continent  5. DVT Prophylaxis: ambulation  6. GI Prophylaxis:ppi  7. Code: full  8. Disposition: home ~ 7 days   9. Follow up Appointments on Discharge: hepatology, transplant surgery      discussed with Dr. Brush  PM&R Staff Physician    Mary Alcantara PA-C  Physical Medicine & Rehabilitation   Pager: [phone]

## 2019-09-26 NOTE — PLAN OF CARE
OT: Pt is close to MOD I for toileting but need to see consistency with walker placement for transfer. Also looking for other recliner so that pt can adjust recliner independently.

## 2019-09-26 NOTE — PROGRESS NOTES
Patient is A&Ox to self, situation. Denies pain. Attempted to flush ND tube, very hard to flush. Called to update MD, order for clog zapper. Clog zapper x1, unsuccessful. Left note to MD regarding clog. Unable to run TF. A1 with walker. Continue with POC.

## 2019-09-26 NOTE — PLAN OF CARE
Patient is A&O4, able to make needs known, using call light appropriately. Patient was up with SBA and walker. VSS. Manages clothing and jessi cares independently. Bowel sounds present, last BM 09/26, reports stools are no longer loose, they are soft but not yet formed, voiding spontaneously in the toilet. No complaint of dizziness, chest pain or SOB. Reports abdominal discomfort/fullness, states this is not changed, cut brief on both sides to try and alleviate pressure on the abdomen, encouraged zofran, given this afternoon to assist with appetite. Drank half a nepro, ate a few bites of her chicken and potatoes. NG pulled this afternoon, sore noted on inside of right nostril. Encourage PO intake. Continue POC.

## 2019-09-27 PROCEDURE — 25000132 ZZH RX MED GY IP 250 OP 250 PS 637: Performed by: GENERAL PRACTICE

## 2019-09-27 PROCEDURE — 12800006 ZZH R&B REHAB

## 2019-09-27 PROCEDURE — 25000132 ZZH RX MED GY IP 250 OP 250 PS 637: Performed by: HOSPITALIST

## 2019-09-27 PROCEDURE — 25000128 H RX IP 250 OP 636: Performed by: HOSPITALIST

## 2019-09-27 PROCEDURE — 99232 SBSQ HOSP IP/OBS MODERATE 35: CPT | Performed by: INTERNAL MEDICINE

## 2019-09-27 PROCEDURE — 97110 THERAPEUTIC EXERCISES: CPT | Mod: GP

## 2019-09-27 PROCEDURE — 25000132 ZZH RX MED GY IP 250 OP 250 PS 637: Performed by: PHYSICIAN ASSISTANT

## 2019-09-27 PROCEDURE — 97140 MANUAL THERAPY 1/> REGIONS: CPT | Mod: GP

## 2019-09-27 PROCEDURE — 97127 ZZHC SP THERAPEUTIC INTERVENTION W/FOCUS ON COGNITIVE FUNCTION,EA 15 MIN: CPT | Mod: GN | Performed by: REHABILITATION PRACTITIONER

## 2019-09-27 PROCEDURE — 97535 SELF CARE MNGMENT TRAINING: CPT | Mod: GO

## 2019-09-27 PROCEDURE — 25000131 ZZH RX MED GY IP 250 OP 636 PS 637: Performed by: PHYSICAL MEDICINE & REHABILITATION

## 2019-09-27 PROCEDURE — G0463 HOSPITAL OUTPT CLINIC VISIT: HCPCS

## 2019-09-27 PROCEDURE — 25000131 ZZH RX MED GY IP 250 OP 636 PS 637: Performed by: INTERNAL MEDICINE

## 2019-09-27 PROCEDURE — 97530 THERAPEUTIC ACTIVITIES: CPT | Mod: GP

## 2019-09-27 RX ORDER — LIDOCAINE 4 G/G
1 PATCH TOPICAL
Status: DISCONTINUED | OUTPATIENT
Start: 2019-09-27 | End: 2019-10-01 | Stop reason: HOSPADM

## 2019-09-27 RX ADMIN — DAPSONE 100 MG: 100 TABLET ORAL at 08:59

## 2019-09-27 RX ADMIN — OXYCODONE HYDROCHLORIDE AND ACETAMINOPHEN 500 MG: 500 TABLET ORAL at 08:59

## 2019-09-27 RX ADMIN — MULTIPLE VITAMINS W/ MINERALS TAB 1 TABLET: TAB at 09:00

## 2019-09-27 RX ADMIN — MELATONIN TAB 3 MG 3 MG: 3 TAB at 22:31

## 2019-09-27 RX ADMIN — MYCOPHENOLIC ACID 720 MG: 360 TABLET, DELAYED RELEASE ORAL at 22:31

## 2019-09-27 RX ADMIN — FAMOTIDINE 20 MG: 20 TABLET, FILM COATED ORAL at 09:00

## 2019-09-27 RX ADMIN — MELATONIN 1000 UNITS: at 22:32

## 2019-09-27 RX ADMIN — ZINC SULFATE CAP 220 MG (50 MG ELEMENTAL ZN) 220 MG: 220 (50 ZN) CAP at 08:59

## 2019-09-27 RX ADMIN — LEVOTHYROXINE SODIUM 175 MCG: 50 TABLET ORAL at 08:59

## 2019-09-27 RX ADMIN — CYCLOSPORINE 225 MG: 100 CAPSULE ORAL at 08:56

## 2019-09-27 RX ADMIN — ACETAMINOPHEN 325 MG: 325 TABLET, FILM COATED ORAL at 08:59

## 2019-09-27 RX ADMIN — MYCOPHENOLIC ACID 720 MG: 360 TABLET, DELAYED RELEASE ORAL at 08:59

## 2019-09-27 RX ADMIN — ASPIRIN 325 MG ORAL TABLET 325 MG: 325 PILL ORAL at 08:59

## 2019-09-27 RX ADMIN — MELATONIN 1000 UNITS: at 08:59

## 2019-09-27 RX ADMIN — HEPARIN SODIUM 5000 UNITS: 5000 INJECTION, SOLUTION INTRAVENOUS; SUBCUTANEOUS at 06:21

## 2019-09-27 RX ADMIN — CYCLOSPORINE 225 MG: 100 CAPSULE ORAL at 18:35

## 2019-09-27 RX ADMIN — VALGANCICLOVIR 450 MG: 450 TABLET, FILM COATED ORAL at 09:00

## 2019-09-27 RX ADMIN — HEPARIN SODIUM 5000 UNITS: 5000 INJECTION, SOLUTION INTRAVENOUS; SUBCUTANEOUS at 22:32

## 2019-09-27 RX ADMIN — MAGNESIUM OXIDE TAB 400 MG (241.3 MG ELEMENTAL MG) 400 MG: 400 (241.3 MG) TAB at 08:59

## 2019-09-27 ASSESSMENT — MIFFLIN-ST. JEOR: SCORE: 1381.47

## 2019-09-27 NOTE — PLAN OF CARE
Discharge Planner PT   Patient plan for discharge: home with supervision from family and  PT.  Current status: mod I in room, FWW  Barriers to return to prior living situation: endurance, strength  Recommendations for discharge: continue to progress endurance and strength  Rationale for recommendations: to be more independent amb longer home and community distances       Entered by: Jaziel Greenberg 09/27/2019 5:57 PM

## 2019-09-27 NOTE — PLAN OF CARE
OT: mod I in room assessed for toileting, grooming, light organizing and item retreival. Pt demonstrating ability to complete the activities with mod I using FWW. Tub transfer demonstrated using tub bench.  tub transfer completed x2 with CGA and max cues. Pt  distracted easily and requires redirecting to follow instructions.

## 2019-09-27 NOTE — PLAN OF CARE
FOCUS/GOAL  Medical management    ASSESSMENT, INTERVENTIONS AND CONTINUING PLAN FOR GOAL:  Patient slept well, no c/opain. She called  for assistance to the bathroom,  found her sit tin at the EOB once. She needed CGA with transfers, ambulation to and from the bathroom and back to bed twice. She performed jessi cares and clothing management with SBA.

## 2019-09-27 NOTE — PROGRESS NOTES
ARU Care Coordinator Progress Note    Admission date: 09/23/2019    Data: Nicci Pemberton is a 60 yo female on ARU for rehab following hospitalization for liver transplant.    Intervention: Met with patient and spoke to  Hugo by phone in the room with patient, to introduce the role of Care Coordinator and to begin discussion of anticipated discharge planning needs. Initiated referral with [facility] for home care services of SN/PT/OT; patient had no agency preference. Updated Transplant Coordinator ED Crystal on patient status and discharge plan.    Assessment: Patient will have above home care needs. PLC transplant class is also ordered in Epic. Reviewed need to follow-up with Transplant team in [facility] the Monday after discharge, understanding verbalized. Patient and  already met with Transplant Pharmacist Carlos Alberto Gore for medication teaching on 9/18. Feeding tube has been removed, mostly drinking Nepro, patient hopes to not need any more tube feeding.    Plan: Will continue to follow discharge planning needs throughout ARU stay. Current target discharge date is 10/1/2019.    Casper Self RN, BSN, Patient Care Management Coordinator  [facility]  [address], [address] [place] [address]  [email or web address]  [email or web address]   Desk: [phone] U Main [phone] Fax [phone] Pager [phone]

## 2019-09-27 NOTE — PROGRESS NOTES
"  [facility]   Acute Rehabilitation Unit  Daily progress note    interval history  Nicci Pemberton was seen resting in bed has tension headache and struggling to get comfortable.  Was able to drink 3 nephro yesterday and is tearful about tf removal, \"can't believe how much better it is without it\", appetite remains low but feels \"can get those drinks down\", no nv/ dizziness, fevers, sob is intermittent, no issues with bowel or bladder.      medications    aspirin  325 mg Oral Daily     cycloSPORINE modified  225 mg Oral BID IS     dapsone  100 mg Oral Daily     famotidine  20 mg Oral Daily     heparin sodium  5,000 Units Subcutaneous Q8H     levothyroxine  175 mcg Oral Daily     lidocaine  2 patch Transdermal Q24h    And     lidocaine   Transdermal Q24H    And     lidocaine   Transdermal Q8H     magnesium oxide  400 mg Oral Daily     melatonin  3 mg Oral At Bedtime     multivitamin w/minerals  1 tablet Oral Daily     mycophenolic acid  720 mg Oral BID     ondansetron  4 mg Oral QAM AC     valGANciclovir  450 mg Oral Daily     vitamin C  500 mg Oral Daily     vitamin D3  1,000 Units Oral BID     zinc sulfate  220 mg Oral Daily        acetaminophen, acetaminophen-caffeine, alum & mag hydroxide-simethicone, alum & mag hydroxide-simethicone, ondansetron, oxymetazoline, - MEDICATION INSTRUCTIONS -, polyethylene glycol, senna-docusate, simethicone, sodium chloride     physical exam  /58 (BP Location: Left arm)   Pulse 79   Temp 97.1  F (36.2  C) (Oral)   Resp 16   Ht 1.575 m (5' 2\")   Wt 85.3 kg (188 lb 1.6 oz)   SpO2 91%   BMI 34.40 kg/m    Gen: awake fatigued  HEENT: mmm  Cardio: rrr  Pulm: non labored clear on room air  Abd: soft reported fullness/pressure  Ext: ble in lymphedema wraps  Neuro/MSK: alert speech clear moves all extremities    labs  Lab Results   Component Value Date    WBC 4.5 09/26/2019     Lab Results   Component Value Date    RBC 2.61 09/26/2019     Lab " Results   Component Value Date    HGB 8.2 09/26/2019     Lab Results   Component Value Date    HCT 27.2 09/26/2019     Lab Results   Component Value Date     09/26/2019     Lab Results   Component Value Date    MCH 31.4 09/26/2019     Lab Results   Component Value Date    MCHC 30.1 09/26/2019     Lab Results   Component Value Date    RDW 20.5 09/26/2019     Lab Results   Component Value Date     09/26/2019       Last Comprehensive Metabolic Panel:  Sodium   Date Value Ref Range Status   09/26/2019 139 133 - 144 mmol/L Final     Potassium   Date Value Ref Range Status   09/26/2019 4.6 3.4 - 5.3 mmol/L Final     Chloride   Date Value Ref Range Status   09/26/2019 102 94 - 109 mmol/L Final     Carbon Dioxide   Date Value Ref Range Status   09/26/2019 29 20 - 32 mmol/L Final     Anion Gap   Date Value Ref Range Status   09/26/2019 8 3 - 14 mmol/L Final     Glucose   Date Value Ref Range Status   09/26/2019 82 70 - 99 mg/dL Final     Urea Nitrogen   Date Value Ref Range Status   09/26/2019 35 (H) 7 - 30 mg/dL Final     Creatinine   Date Value Ref Range Status   09/26/2019 0.86 0.52 - 1.04 mg/dL Final     GFR Estimate   Date Value Ref Range Status   09/26/2019 73 >60 mL/min/[1.73_m2] Final     Comment:     Non  GFR Calc  Starting 12/18/2018, serum creatinine based estimated GFR (eGFR) will be   calculated using the Chronic Kidney Disease Epidemiology Collaboration   (CKD-EPI) equation.       Calcium   Date Value Ref Range Status   09/26/2019 8.7 8.5 - 10.1 mg/dL Final         Rehabilitation - continue comprehensive acute inpatient rehabilitation program with multidisciplinary approach including therapies, rehab nursing, and physiatry following. See interval history for updates.      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     ESLD 2/2 ANGULO & heterozygous alpha-1 antitrypsin deficiency s/p  donor liver transplant . LFTs down trending  T bili WNL, , ALT 53 AST 44  -appreciate ongoing assistance per transplant and hospitalist service  -no lifting >10 pounds  -transplant coordinator: Zina [phone]  F/u transplant surgeon, transplant hepatology  - CBC, CMP, mag, phos, and CSA levels (12 hours after administration) to be drawn every Monday and Thursday   -Immunosuppression: myfortic, cyclosporine (goal 200-300)  -Prophylaxis: dapsone x 6 months, valacyclovir x 12 weeks  -ongoing ab pain- monitor     Toxic and metabolic encephalopathy- multifactorial: meds, sleep, illness, MRI brain 9/3 neg.  Evaluated by neurology  with workup. .  -continue to monitor  -SLP consult for cog eval  -delirium precautions ordered     C-Diff- +   -continue po vanco      Acute blood loss anemia- with anemia of chronic disease.  Last transfused .  Hgb stable 8.2    -trend Mon/Thurs     Severe Malnutrition - acute on chronic illness s/p ND  per IR.  Goal to taper off and discontinue TF prior to ARU discharge. Clogged   Removed with trial po intake  marcie count 1328  -appreciate nutrition consult  -marcie counts  -encourage po intake     Medical Device Related Pressure Injury (MDRPI) due to Bridle string  Pressure Injury  Stage 2   -wound care as ordered     Hypothyroidism- continue synthroid      1. Adjustment to disability:  frustrated  2. FEN: reg + TF with poor intake  3. Bowel: continent  4. Bladder: continent  5. DVT Prophylaxis: ambulation  6. GI Prophylaxis:ppi  7. Code: full  8. Disposition: home ~ 7 days   9. Follow up Appointments on Discharge: hepatology, transplant surgery      discussed with Dr. Brush  PM&R Staff Physician    Mary  Maricruz PARKER  Physical Medicine & Rehabilitation   Pager: [phone]

## 2019-09-27 NOTE — PROGRESS NOTES
"[facility], [place]   Internal Medicine Daily Note           Interval History/Events     Overnight event reviewed  Hand off taken from Dr. Meier  Patient reports ongoing issues with abdominal fullness and nausea  No vomiting. Appetite is poor  Passing gas and having bowel movements.          Review of Systems        4 point ROS including Respiratory, CV, GI and , other than that noted above is negative      Medications   I have reviewed current medications  in the \"current medication\" section of Epic.  Relevant changes include:     Physical Exam   General:       Vital signs:    Blood pressure 105/58, pulse 79, temperature 97.1  F (36.2  C), temperature source Oral, resp. rate 16, height 1.575 m (5' 2\"), weight 85.3 kg (188 lb 1.6 oz), SpO2 91 %, not currently breastfeeding.  Estimated body mass index is 34.4 kg/m  as calculated from the following:    Height as of this encounter: 1.575 m (5' 2\").    Weight as of this encounter: 85.3 kg (188 lb 1.6 oz).      Intake/Output Summary (Last 24 hours) at 9/27/2019 1052  Last data filed at 9/27/2019 0045  Gross per 24 hour   Intake 490 ml   Output 500 ml   Net -10 ml      HEENT: No icterus, no pallor  Cardiovascular: S1, S2 normal  Respiratory:  B/L CTA  GI/Abdomen: Soft, NT, BS+  Neurology: Alert, awake, and oriented. No tremors.   Extremities: No pretibial edema.   Skin:      Laboratory and Imaging Studies     I have reviewed  laboratory and imaging studies in the Epic. Pertinent findings are as below:    BMP  Recent Labs   Lab 09/26/19  0644 09/24/19  1106 09/23/19  0602 09/22/19  0711    138 138 138   POTASSIUM 4.6 5.0 4.7 4.8   CHLORIDE 102 99 103 102   JACOB 8.7 9.4 9.3 9.2   CO2 29 33* 31 31   BUN 35* 39* 36* 35*   CR 0.86 0.96 0.78 0.76   GLC 82 94 104* 96     CBC  Recent Labs   Lab 09/26/19  0644 09/23/19  0602 09/22/19  0711 09/21/19  0655   WBC 4.5 4.9 4.8 4.6   RBC 2.61* 2.52* 2.48* 2.55*   HGB 8.2* 7.9* 7.7* 7.8*   HCT 27.2* " 26.5* 25.8* 26.2*   * 105* 104* 103*   MCH 31.4 31.3 31.0 30.6   MCHC 30.1* 29.8* 29.8* 29.8*   RDW 20.5* 19.3* 19.3* 18.9*    213 207 195     INRNo lab results found in last 7 days.  LFTs  Recent Labs   Lab 19  0644 19  1106 19  0602 19  0711   ALKPHOS 222* 251* 230* 216*   AST 44 51* 52* 48*   ALT 53* 61* 57* 50   BILITOTAL 1.3 1.5* 1.0 1.2   PROTTOTAL 5.7* 6.3* 5.6* 5.6*   ALBUMIN 2.4* 2.7* 2.3* 2.3*      PANCNo lab results found in last 7 days.        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  Underwent  donor liver transplant 2019.  Post operative course complicated by toxic and metabolic encephalopathy, C-Diff diarrhea, hyperkalemia, steroid induced hyperglycemia,  impaired oral intake, nausea, epistaxis,  acute blood loss anemia, and  Weakness. Extensive work up done for encephalopathy. It has been slowly resolving  She is on oral vancomycin for cdiff. She is transferred to rehab.       # ANGULO, Heterozygote for Alpha 1 Antitrypsin Deficiency, S/P Liver Transplant on 19:   Graft functioning well.  Mild increase in LFT's  On the discharge day and hence Mycophenolate was increased from 500 BID to 720  Immunosuppression: mycophenolate (Myfortic 720mg BID) Cyclosporine 225 mg BID.  Tacrolimus was discontinued on  due to prolonged delirium.   Transplant team note reviewed from 2019  Prophylaxis: dapsone (x6 months), valganciclovir (x12 weeks), Mycelex completed inpatient.    Transplant coordinator Zina [phone].  Biliary stent:  No stent on AXR 19  Donor status:  DBD  CMV D- / R +  EBV D + / R +     # C Diff Colitis:   Started on oral Vanc on 09/17/19. Recommend at least two weeks treatment given immunocompromised state.    Monitor.         # Toxic and Metabolic  Encephalopathy: ( Resolved)    Has resolved. Tacro stopped and several days later encephalopathy improved.  Was seen by neurology, had work up done in patient at [facility].     # Acute blood loss anemia on Anemia of chronic disease. Hb 7.9.   Has low normal percent saturation and low serum iron. There could be additional iron deficiency given recurrent blood draws and poor nutrition. Can consider adding daily iron 325 mg po daily. Folate, Vit B 12 was fine.   Labs every M/Thu        #  Hypothyroidism: Ct synthroid     # Severe Malnutrition: On TF, Tx  per primary team.      #  Hypoxemia on 09.25 night:   Likely secondary to atelectasis ? DIXIE  Currently on RA.   Advised patient to use incentive spirometry  Wean off oxygen as tolerated.   - Consider ON oximetery, outpatient sleep study If continues to have nocturnal hypoxia.         # Diet: Regular Diet Adult  Snacks/Supplements Adult: Nepro Oral Supplement; Between Meals  Calorie Counts  Room Service  Adult Formula Drip Feeding: Continuous Nepro; Nasoduodenal tube; Goal Rate: 65; mL/hr; From: 6:00 PM; 6:00 AM; Medication - Feeding Tube Flush Frequency: At least 15-30 mL water before and after medication administration and with tube clogging; Am...  Room Service    DVT Prophylaxis: Heparin SQ  Forrester Catheter: not present  Code Status: Full Code        Pt's care was discussed with bedside RN, patient and  during Care Team Rounds.               Josué Jimenez MD  Hospitalist ( Internal medicine)  Pager: [phone]

## 2019-09-27 NOTE — PROGRESS NOTES
09/27/19 1039   Signing Clinician's Name / Credentials   Signing clinician's name / credentials Yadi Dinesh MS CCC-SLP   Quick Adds   Rehab Discipline SLP   Additional Documentation   Rehab Comments SLP: pt reports tension headache   SLP Plan SLP: mod-complex level cognitive tasks.   Total Session Time   Total Session Time (minutes) 40 minutes  (40' cognitive tx)   SLP - Acute Rehab Center Time   Individual Time (minutes) - enter zero if not applicable - SLP 40   Group Time (minutes) - enter zero if not applicable  - SLP 0   Concurrent Time (minutes) - enter zero if not applicable  - SLP 0   Co-Treatment Time (minutes) - enter zero if not applicable  - SLP 0   ARC Total Session Time (minutes) - SLP 40   Expression (FIM)   Functional Performance Complete independence expressing complex/abstract ideas   Social Interaction (FIM)   Social Interaction Comment   SLP: pt pleasant/cooperative despite c/o headache.   Memory (FIM)   Memory Comment SLP: Administered WJR visual auditory learning subtest. Pt scored in 21st percentile compared to age based peers. This appears below baseline.

## 2019-09-27 NOTE — PROGRESS NOTES
Patient is alert and oriented, but can be forgetful. CGA with walker. 1 BM this shift. Voiding in toilet. Only ate few bites of meal this shift, but did consume 1.5 cans of nepro. Skin intact. Continue with POC.

## 2019-09-27 NOTE — PROGRESS NOTES
NUTRITION SERVICES - Calorie Counts    Pt consumed a total of 1328 calories and 62 gm protein on 9/26. This met 74% of calorie needs and 69% of protein needs.     - Breakfast: 0%  - Lunch: A few bites of chicken and mashed potatoes - 25 kcal, 3g protein  - Dinner: A few bites of hot roast beef and mashed potatoes - 28 kcal, 2g protein   - Supplements: 100% of 3 Nepro supplements - 1275 kcal, 57g protein     Tg Chawla, COREY, LD  Pgr: [phone]

## 2019-09-27 NOTE — PROGRESS NOTES
[facility] Nurse Inpatient Pressure Injury Assessment   Reason for consultation: Evaluate and treat nose      ASSESSMENT  Pressure Injury: on Left nare extending to columella, hospital acquired  This is a Medical Device Related Pressure Injury (MDRPI) due to Bridle string  Pressure Injury is Stage 2   Contributing factor of the pressure injury: pressure  Status: resurfaced     TREATMENT PLAN  Bilateral nares and across septum wound: Every shift check to ensure dressing in place and that tube is NOT taped to face. Daily: change strip of comfeel with strips extending into bilateral nares across septum. For eating please tuck tube behind ear.    Orders Written  WO Nurse follow-up plan: signing off  Nursing to notify the Provider(s) and re-consult the WO Nurse if wound(s) deteriorates or new skin concern.    Patient History  According to provider note(s):  Nicci Pemberton is a 58 yo female with a past medical history significant for end stage liver disease 2/2 ANGULO and alpha 1 anti-trypsin deficiency now s/p DD OLT on 8/18/19.     Objective Data  Containment of urine/stool: Continent of bowel and Continent of bladder    Current Diet/ Nutrition:  Orders Placed This Encounter      Regular Diet Adult      Output:   I/O last 3 completed shifts:  In: 490 [P.O.:490]  Out: 775 [Urine:775]    Risk Assessment:   Sensory Perception: 3-->slightly limited  Moisture: 4-->rarely moist  Activity: 3-->walks occasionally  Mobility: 3-->slightly limited  Nutrition: 3-->adequate  Friction and Shear: 3-->no apparent problem  Umair Score: 19      Labs:   Recent Labs   Lab 09/26/19  0644   ALBUMIN 2.4*   HGB 8.2*   WBC 4.5       Physical Exam  Skin inspection: focused nose    Wound Location:  Columella and left nare         Left nostril       Columella    Date of last Photo 9/23/19  Wound History: Per Dietitian Criss Iglesias wound found this morning with bridle tight, twisted, imbedded in left nare so it was not visible, and tegaderm taped to right  cheek. Dietitian then removed tape, untwisted bridle, remove from nasal mucosa, and loosen bridle. Per RN Aida Guevara patient has had four different NJs placed since 9/6, and goal is for patient to eat enough to be able to remove, however patient very tired today. Patient was unable to report how long nose has been hurting. Writer had cally DE CWOCN also assess wound.  9/23: Improved appearance of wound.  Noted tube taped to her left cheek so she can eat.  This pulled tube against her R nare with no injury at this time.  Will add to POC to allow pt to tuck tube behind her ear.    9/24 pt had dressing pulled taught against her cheek, educated pt/nurse on clipping to shirt with safety pin or tucking behind ear  9/27: Feeding tube discontinued on 9/26. Skin is healed on assessment today.    Interventions  Current support surface: Standard  Atmos Air mattress -   Current off-loading measures: Pillows under calves  Repositioning aid: Pillows  Visual inspection of wound(s) completed   Tube Securement: bridle  Wound Care: was done per plan of care.  Supplies: at bedside  Educated provided: plan of care and wound progress  Education provided to: patient  and nurse  Discussed importance of:off-loading pressure to wound and not taping down to cheek.   Discussed plan of care with Patient, RN    Yany Andres RN, CWOCN

## 2019-09-27 NOTE — PLAN OF CARE
Administered WJR visual auditory learning subtest. Pt scored in 21st percentile compared to age based peers. This appears below baseline.

## 2019-09-27 NOTE — PLAN OF CARE
Pt has large abdominal incision as a result of liver transplant, it I clean, dry and intact. It is open to air. Pt is off tube feeding, she is on a calorie count. She eats less than 25% but takes her supplement at a 100%. She has a heating pad for upper back and neck pain. She also had 325 mg Tylenol for that with effective result. Excedrin is also available if needed but she declined this morning. We will continue to monitor surgical incision and pain while assisting with activity of daily livings.

## 2019-09-28 PROCEDURE — 25000131 ZZH RX MED GY IP 250 OP 636 PS 637: Performed by: HOSPITALIST

## 2019-09-28 PROCEDURE — 25000132 ZZH RX MED GY IP 250 OP 250 PS 637: Performed by: HOSPITALIST

## 2019-09-28 PROCEDURE — 25000132 ZZH RX MED GY IP 250 OP 250 PS 637: Performed by: PHYSICIAN ASSISTANT

## 2019-09-28 PROCEDURE — 25000131 ZZH RX MED GY IP 250 OP 636 PS 637: Performed by: PHYSICAL MEDICINE & REHABILITATION

## 2019-09-28 PROCEDURE — 25000132 ZZH RX MED GY IP 250 OP 250 PS 637: Performed by: GENERAL PRACTICE

## 2019-09-28 PROCEDURE — 99232 SBSQ HOSP IP/OBS MODERATE 35: CPT | Performed by: INTERNAL MEDICINE

## 2019-09-28 PROCEDURE — 12800006 ZZH R&B REHAB

## 2019-09-28 PROCEDURE — 97127 ZZHC SP THERAPEUTIC INTERVENTION W/FOCUS ON COGNITIVE FUNCTION,EA 15 MIN: CPT | Mod: GN | Performed by: SPEECH-LANGUAGE PATHOLOGIST

## 2019-09-28 PROCEDURE — 97110 THERAPEUTIC EXERCISES: CPT | Mod: GP

## 2019-09-28 PROCEDURE — 25000131 ZZH RX MED GY IP 250 OP 636 PS 637: Performed by: INTERNAL MEDICINE

## 2019-09-28 PROCEDURE — 25000128 H RX IP 250 OP 636: Performed by: HOSPITALIST

## 2019-09-28 RX ORDER — MECLIZINE HCL 12.5 MG 12.5 MG/1
12.5 TABLET ORAL 3 TIMES DAILY PRN
Status: DISCONTINUED | OUTPATIENT
Start: 2019-09-28 | End: 2019-10-01 | Stop reason: HOSPADM

## 2019-09-28 RX ADMIN — MELATONIN 1000 UNITS: at 21:53

## 2019-09-28 RX ADMIN — OXYCODONE HYDROCHLORIDE AND ACETAMINOPHEN 500 MG: 500 TABLET ORAL at 08:23

## 2019-09-28 RX ADMIN — MYCOPHENOLIC ACID 720 MG: 360 TABLET, DELAYED RELEASE ORAL at 08:23

## 2019-09-28 RX ADMIN — MELATONIN TAB 3 MG 3 MG: 3 TAB at 21:53

## 2019-09-28 RX ADMIN — MYCOPHENOLIC ACID 720 MG: 360 TABLET, DELAYED RELEASE ORAL at 21:53

## 2019-09-28 RX ADMIN — ONDANSETRON 4 MG: 4 TABLET, ORALLY DISINTEGRATING ORAL at 06:50

## 2019-09-28 RX ADMIN — VALGANCICLOVIR 450 MG: 450 TABLET, FILM COATED ORAL at 08:23

## 2019-09-28 RX ADMIN — HEPARIN SODIUM 5000 UNITS: 5000 INJECTION, SOLUTION INTRAVENOUS; SUBCUTANEOUS at 21:54

## 2019-09-28 RX ADMIN — MULTIPLE VITAMINS W/ MINERALS TAB 1 TABLET: TAB at 08:23

## 2019-09-28 RX ADMIN — CYCLOSPORINE 225 MG: 100 CAPSULE ORAL at 18:25

## 2019-09-28 RX ADMIN — LEVOTHYROXINE SODIUM 175 MCG: 50 TABLET ORAL at 08:22

## 2019-09-28 RX ADMIN — HEPARIN SODIUM 5000 UNITS: 5000 INJECTION, SOLUTION INTRAVENOUS; SUBCUTANEOUS at 14:25

## 2019-09-28 RX ADMIN — ASPIRIN 325 MG ORAL TABLET 325 MG: 325 PILL ORAL at 08:22

## 2019-09-28 RX ADMIN — HEPARIN SODIUM 5000 UNITS: 5000 INJECTION, SOLUTION INTRAVENOUS; SUBCUTANEOUS at 06:51

## 2019-09-28 RX ADMIN — FAMOTIDINE 20 MG: 20 TABLET, FILM COATED ORAL at 08:23

## 2019-09-28 RX ADMIN — DAPSONE 100 MG: 100 TABLET ORAL at 08:23

## 2019-09-28 RX ADMIN — ACETAMINOPHEN 325 MG: 325 TABLET, FILM COATED ORAL at 19:57

## 2019-09-28 RX ADMIN — CYCLOSPORINE 225 MG: 100 CAPSULE ORAL at 08:23

## 2019-09-28 RX ADMIN — MAGNESIUM OXIDE TAB 400 MG (241.3 MG ELEMENTAL MG) 400 MG: 400 (241.3 MG) TAB at 08:23

## 2019-09-28 RX ADMIN — MELATONIN 1000 UNITS: at 08:23

## 2019-09-28 RX ADMIN — ZINC SULFATE CAP 220 MG (50 MG ELEMENTAL ZN) 220 MG: 220 (50 ZN) CAP at 08:23

## 2019-09-28 ASSESSMENT — MIFFLIN-ST. JEOR: SCORE: 1382.83

## 2019-09-28 NOTE — PLAN OF CARE
ADDENDUM: sent FYI page to on call PMR resident at around 1900, notified of ongoing hypoxia that patient was requiring O2 at 2L per NC to keep sats >90%.  Next nurse is aware to continue monitoring.     FOCUS/GOAL  Bowel management, Medical management, Energy conservation and Reinforcement of self-care/ADL    ASSESSMENT, INTERVENTIONS AND CONTINUING PLAN FOR GOAL:  Patient is oriented x4, has been mod I but was calling for standby assist on this shift because of dizziness that she was having yesterday and early this am.  Patient denied dizziness later in the am but still reported feeling very drowsy.  Vitals were stable upon assessment except patient O2 sats were dipping down to high 80's so patient was placed on NC at 2L and has since been staying >90%, continue to monitor.  Patient was assessed by both hospitalist and PMR md today, Meclazine added PRN for patient if dizziness becomes an issue again.  Patient was able to eat some breakfast later in the day around noon and does drink the protein supplements as well.  Patient had continent bowel movement in the BR that was loose but this is ongoing for patient.  No additional care concerns, continue to encourage intake and spot checking O2.

## 2019-09-28 NOTE — PROGRESS NOTES
"  [facility]   Acute Rehabilitation Unit  Daily progress note     INTERVAL HISTORY  Nicci Pemberton was seen resting in bed.  She reported dizziness yesterday. I also discussed with Hospitalist, who has ordered antivert prn.  She feels very tired today. She had PT earlier, but asked to take rest rather than perform OT this morning.  no nv, but did have dizziness yesterday. She denies fevers, denies issues with bowel or bladder.        MEDICATIONS    aspirin  325 mg Oral Daily     cycloSPORINE modified  225 mg Oral BID IS     dapsone  100 mg Oral Daily     famotidine  20 mg Oral Daily     heparin sodium  5,000 Units Subcutaneous Q8H     levothyroxine  175 mcg Oral Daily     lidocaine  2 patch Transdermal Q24h     And     lidocaine   Transdermal Q24H     And     lidocaine   Transdermal Q8H     magnesium oxide  400 mg Oral Daily     melatonin  3 mg Oral At Bedtime     multivitamin w/minerals  1 tablet Oral Daily     mycophenolic acid  720 mg Oral BID     ondansetron  4 mg Oral QAM AC     valGANciclovir  450 mg Oral Daily     vitamin C  500 mg Oral Daily     vitamin D3  1,000 Units Oral BID     zinc sulfate  220 mg Oral Daily        acetaminophen, acetaminophen-caffeine, alum & mag hydroxide-simethicone, alum & mag hydroxide-simethicone, ondansetron, oxymetazoline, - MEDICATION INSTRUCTIONS -, polyethylene glycol, senna-docusate, simethicone, sodium chloride      PHYSICAL EXAM  /58 (BP Location: Left arm)   Pulse 79   Temp 97.1  F (36.2  C) (Oral)   Resp 16   Ht 1.575 m (5' 2\")   Wt 85.3 kg (188 lb 1.6 oz)   SpO2 91%   BMI 34.40 kg/m    Gen: awake fatigued. Lying in bed comfortably  HEENT: NC,AT  Cardio: rrr  Pulm: non labored   Abd: soft   Ext: ble in lymphedema wraps  Neuro/MSK: alert speech clear moves all extremities     LABS        Lab Results   Component Value Date     WBC 4.5 09/26/2019            Lab Results   Component Value Date     RBC 2.61 09/26/2019          "   Lab Results   Component Value Date     HGB 8.2 09/26/2019            Lab Results   Component Value Date     HCT 27.2 09/26/2019            Lab Results   Component Value Date      09/26/2019            Lab Results   Component Value Date     MCH 31.4 09/26/2019            Lab Results   Component Value Date     MCHC 30.1 09/26/2019            Lab Results   Component Value Date     RDW 20.5 09/26/2019            Lab Results   Component Value Date      09/26/2019         Last Comprehensive Metabolic Panel:        Sodium   Date Value Ref Range Status   09/26/2019 139 133 - 144 mmol/L Final            Potassium   Date Value Ref Range Status   09/26/2019 4.6 3.4 - 5.3 mmol/L Final            Chloride   Date Value Ref Range Status   09/26/2019 102 94 - 109 mmol/L Final            Carbon Dioxide   Date Value Ref Range Status   09/26/2019 29 20 - 32 mmol/L Final            Anion Gap   Date Value Ref Range Status   09/26/2019 8 3 - 14 mmol/L Final            Glucose   Date Value Ref Range Status   09/26/2019 82 70 - 99 mg/dL Final            Urea Nitrogen   Date Value Ref Range Status   09/26/2019 35 (H) 7 - 30 mg/dL Final            Creatinine   Date Value Ref Range Status   09/26/2019 0.86 0.52 - 1.04 mg/dL Final      GFR Estimate   Date Value Ref Range Status   09/26/2019 73 >60 mL/min/[1.73_m2] Final       Comment:       Non  GFR Calc  Starting 12/18/2018, serum creatinine based estimated GFR (eGFR) will be   calculated using the Chronic Kidney Disease Epidemiology Collaboration   (CKD-EPI) equation.               Calcium   Date Value Ref Range Status   09/26/2019 8.7 8.5 - 10.1 mg/dL Final            Rehabilitation - continue comprehensive acute inpatient rehabilitation program with multidisciplinary approach including therapies, rehab nursing, and physiatry following. See interval history for updates.       ASSESSMENT AND PLAN     Nicci Pemberton is a 59 year old woman with a past medical  history of HTN, hypothyroidism, and end stage liver disease secondary to ANGULO and heterozygous alpha-1 antitrypsin deficiency admitted to [facility]  in decompensated liver failure underwent liver transplant , admitted to  ARU for ongoing rehabilitation and medical management 2019.      ESLD 2/2 ANGULO & heterozygous alpha-1 antitrypsin deficiency s/p  donor liver transplant . LFTs down trending  T bili WNL, , ALT 53 AST 44  -appreciate ongoing assistance per transplant and hospitalist service  -no lifting >10 pounds  -transplant coordinator: Zina [phone]  F/u transplant surgeon, transplant hepatology  - CBC, CMP, mag, phos, and CSA levels (12 hours after administration) to be drawn every Monday and Thursday   -Immunosuppression: myfortic, cyclosporine (goal 200-300)  -Prophylaxis: dapsone x 6 months, valacyclovir x 12 weeks  -ongoing ab pain- monitor     Toxic and metabolic encephalopathy- multifactorial: meds, sleep, illness, MRI brain 9/3 neg.  Evaluated by neurology  with workup. .  -continue to monitor  -SLP consult for cog eval  -delirium precautions ordered     C-Diff- +   -continue po vanco      Acute blood loss anemia- with anemia of chronic disease.  Last transfused .  Hgb stable 8.2    -trend Mon/Thurs     Severe Malnutrition - acute on chronic illness s/p ND  per IR.  Goal to taper off and discontinue TF prior to ARU discharge. Clogged   Removed with trial po intake  marcie count 1328  -appreciate nutrition consult  -marcie counts  -encourage po intake     Medical Device Related Pressure Injury (MDRPI) due to Bridle string  Pressure Injury  Stage 2   -wound care as ordered     Hypothyroidism- continue synthroid        1. Adjustment to disability:  frustrated  2. FEN: reg + TF with poor intake  3. Bowel: continent  4. Bladder: continent  5. DVT Prophylaxis: ambulation  6. GI Prophylaxis:ppi  7. Code: full  8. Disposition: home ~ 7 days   9. Follow  up Appointments on Discharge: hepatology, transplant surgery        I, Dr. Dewey, have seen and examined the patient.   Total time: >15 min evaluating the patient and coordinating care     Ankur Dewey MD, PhD

## 2019-09-28 NOTE — PLAN OF CARE
FOCUS/GOAL  Bowel management, Bladder management and Pain management    ASSESSMENT, INTERVENTIONS AND CONTINUING PLAN FOR GOAL:  Pt is alert and oriented, calling appropriatly, juan in room but used call light to transfer overniht due to persistent dizziness, hospitalist contacted, VSS, no orthostatic hypotension present, no signs or symptoms of stroke, continent of bowel and bladder, fluids promoted, denied pain, sob, cp or new numbness or tingling, Pt O2 stats were in 80's when first reporting dizziness so pt was placed on 2 LPM O2 and then remained above 90, no further care concerns at this time continue with POC.     PMR resident informed of situation in am.

## 2019-09-28 NOTE — PLAN OF CARE
FOCUS/GOAL  Medical management, Skin integrity, and Safety management    ASSESSMENT, INTERVENTIONS AND CONTINUING PLAN FOR GOAL:  Pt is alert and oriented but forgetful sometimes. Denied pain. Declined lidocaine patch and preferred to use the heating pad if needed for pain. Continent of bowel and bladder.Pt has poor appetite, she didn't eat her evening meal, but drunk her supplement. She's Mod I in room with a walker. Ok to continue with POC.

## 2019-09-28 NOTE — PROGRESS NOTES
09/28/19 1157   Signing Clinician's Name / Credentials   Signing clinician's name / credentials Cinthia GAMEZ   Quick Adds   Rehab Discipline SLP   Additional Documentation   SLP Plan SLP: continue with high level reasoning memory   Total Session Time   Total Session Time (minutes) 55 minutes   Comprehension (FIM)   Functional Performance Complete independence comprehending complex/abstract ideas   Expression (FIM)   Functional Performance Complete independence expressing complex/abstract ideas   Problem Solving (FIM)   Functional Performance Needs increased time to make decisions and solve complex problems   Problem Solving Comment SLP: WJ-R verbal analogies 74th percentile, mild difficulty with reasoning/planning task (IPAD, flow), slow processing   Memory (FIM)   Functional Performance Minimal prompting-recognizes and remembers 75-90% of the time   Memory Comment SLP repetition for new learning

## 2019-09-28 NOTE — PLAN OF CARE
PT: pt feeling slightly better this pm, but still requesting less intense session. Focus on LE strengthening exercises.

## 2019-09-28 NOTE — PROVIDER NOTIFICATION
Hospitalist contacted at 3am due to persistent dizziness reported by pt. Orthostatic BP ordered along with am labs. No orthostatic hypotension noted.

## 2019-09-28 NOTE — PROGRESS NOTES
"[facility], [place]   Internal Medicine Daily Note           Interval History/Events     Overnight event reviewed  Keene dizzy last night. Not feeling dizzy currently  Described as spinning sensation. No feeling of passing out  No related to any position  No nausea currently felt nauseous last night  No vomiting  Abdominal fullness, which is intermittent and ongoing  Had BM  No issues with urination.         Review of Systems        4 point ROS including Respiratory, CV, GI and , other than that noted above is negative      Medications   I have reviewed current medications  in the \"current medication\" section of Epic.  Relevant changes include:     Physical Exam   General:       Vital signs:    Blood pressure 109/66, pulse 75, temperature 98.4  F (36.9  C), temperature source Oral, resp. rate 16, height 1.575 m (5' 2\"), weight 85.5 kg (188 lb 6.4 oz), SpO2 93 %, not currently breastfeeding.  Estimated body mass index is 34.46 kg/m  as calculated from the following:    Height as of this encounter: 1.575 m (5' 2\").    Weight as of this encounter: 85.5 kg (188 lb 6.4 oz).      Intake/Output Summary (Last 24 hours) at 9/27/2019 1052  Last data filed at 9/27/2019 0045  Gross per 24 hour   Intake 490 ml   Output 500 ml   Net -10 ml      HEENT: No icterus, no pallor  Cardiovascular: S1, S2 normal  Respiratory:  B/L CTA  GI/Abdomen: Soft, NT, BS+. Surgical marks with no sign of infection  Neurology: Alert, awake, and oriented. No tremors.   Extremities: B/l  pretibial edema.   Skin:      Laboratory and Imaging Studies     I have reviewed  laboratory and imaging studies in the Epic. Pertinent findings are as below:    BMP  Recent Labs   Lab 09/26/19  0644 09/24/19  1106 09/23/19  0602 09/22/19  0711    138 138 138   POTASSIUM 4.6 5.0 4.7 4.8   CHLORIDE 102 99 103 102   JACOB 8.7 9.4 9.3 9.2   CO2 29 33* 31 31   BUN 35* 39* 36* 35*   CR 0.86 0.96 0.78 0.76   GLC 82 94 104* 96     CBC  Recent " Labs   Lab 19  0644 19  0602 19  0711   WBC 4.5 4.9 4.8   RBC 2.61* 2.52* 2.48*   HGB 8.2* 7.9* 7.7*   HCT 27.2* 26.5* 25.8*   * 105* 104*   MCH 31.4 31.3 31.0   MCHC 30.1* 29.8* 29.8*   RDW 20.5* 19.3* 19.3*    213 207     INRNo lab results found in last 7 days.  LFTs  Recent Labs   Lab 19  0644 19  1106 19  0602 19  0711   ALKPHOS 222* 251* 230* 216*   AST 44 51* 52* 48*   ALT 53* 61* 57* 50   BILITOTAL 1.3 1.5* 1.0 1.2   PROTTOTAL 5.7* 6.3* 5.6* 5.6*   ALBUMIN 2.4* 2.7* 2.3* 2.3*      PANCNo lab results found in last 7 days.        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  Underwent  donor liver transplant 2019.  Post operative course complicated by toxic and metabolic encephalopathy, C-Diff diarrhea, hyperkalemia, steroid induced hyperglycemia,  impaired oral intake, nausea, epistaxis,  acute blood loss anemia, and  Weakness. Extensive work up done for encephalopathy. It has been slowly resolving  She is on oral vancomycin for cdiff. She is transferred to rehab.       # ANGULO, Heterozygote for Alpha 1 Antitrypsin Deficiency, S/P Liver Transplant on 19:   Graft functioning well.  Mild increase in LFT's  On the discharge day and hence Mycophenolate was increased from 500 BID to 720  Immunosuppression: mycophenolate (Myfortic 720mg BID) Cyclosporine 225 mg BID.  Tacrolimus was discontinued on  due to prolonged delirium.   Transplant team note reviewed from 2019  Prophylaxis: dapsone (x6 months), valganciclovir (x12 weeks), Mycelex completed inpatient.    Transplant coordinator Zina [phone].  Biliary stent:  No stent on AXR 19  Donor status:  DBD  CMV D- / R +  EBV D + / R +    # Dizziness: Reports intermittent issues with dizziness. Defined as spinning  sensation. Orthostatic vitals are stable. Not present during evaluation. No hearing diffiuculty. No tinnitus. No focal neuro deficit. Doubt cardiovascular etiology  - Meclizine PRN  - Continue to monitor for now     # C Diff Colitis:   Started on oral Vanc on 09/17/19. Completed 2 weeks treatment on 09/26.       # Toxic and Metabolic Encephalopathy: ( Resolved)    Has resolved. Tacro stopped and several days later encephalopathy improved.  Was seen by neurology, had work up done in patient at [facility].     # Acute blood loss anemia on Anemia of chronic disease. Hb 7.9.   Has low normal percent saturation and low serum iron. There could be additional iron deficiency given recurrent blood draws and poor nutrition. Can consider adding daily iron 325 mg po daily. Folate, Vit B 12 was fine.   - CBC  M/Thu        #  Hypothyroidism: Ct synthroid     # Severe Malnutrition: On TF, Tx  per primary team.      #  Hypoxemia on 09.25 night:   Likely secondary to atelectasis ? DIXIE  Advised patient to use incentive spirometry  Denies any chest pain, shortness of breath  - Wean off oxygen as tolerated.   - Consider ON oximetery, outpatient sleep study If continues to have nocturnal hypoxia.         # Diet: Regular Diet Adult  Snacks/Supplements Adult: Nepro Oral Supplement; Between Meals  Calorie Counts  Room Service  Adult Formula Drip Feeding: Continuous Nepro; Nasoduodenal tube; Goal Rate: 65; mL/hr; From: 6:00 PM; 6:00 AM; Medication - Feeding Tube Flush Frequency: At least 15-30 mL water before and after medication administration and with tube clogging; Am...  Room Service    DVT Prophylaxis: Heparin SQ  Forrester Catheter: not present  Code Status: Full Code        Pt's care was discussed with bedside RN, patient and  during Care Team Rounds.               Josué Jimenez MD  Hospitalist ( Internal medicine)  Pager: [phone]

## 2019-09-29 ENCOUNTER — APPOINTMENT (OUTPATIENT)
Dept: GENERAL RADIOLOGY | Facility: CLINIC | Age: 59
DRG: 949 | End: 2019-09-29
Attending: INTERNAL MEDICINE
Payer: COMMERCIAL

## 2019-09-29 LAB
ALBUMIN SERPL-MCNC: 2.8 G/DL (ref 3.4–5)
ALP SERPL-CCNC: 196 U/L (ref 40–150)
ALT SERPL W P-5'-P-CCNC: 40 U/L (ref 0–50)
ANION GAP SERPL CALCULATED.3IONS-SCNC: 5 MMOL/L (ref 3–14)
AST SERPL W P-5'-P-CCNC: 29 U/L (ref 0–45)
BILIRUB SERPL-MCNC: 1.5 MG/DL (ref 0.2–1.3)
BUN SERPL-MCNC: 32 MG/DL (ref 7–30)
CALCIUM SERPL-MCNC: 8.9 MG/DL (ref 8.5–10.1)
CHLORIDE SERPL-SCNC: 102 MMOL/L (ref 94–109)
CO2 SERPL-SCNC: 33 MMOL/L (ref 20–32)
CREAT SERPL-MCNC: 1.02 MG/DL (ref 0.52–1.04)
ERYTHROCYTE [DISTWIDTH] IN BLOOD BY AUTOMATED COUNT: 21 % (ref 10–15)
GFR SERPL CREATININE-BSD FRML MDRD: 60 ML/MIN/{1.73_M2}
GLUCOSE SERPL-MCNC: 93 MG/DL (ref 70–99)
HCT VFR BLD AUTO: 28.4 % (ref 35–47)
HGB BLD-MCNC: 8.5 G/DL (ref 11.7–15.7)
MCH RBC QN AUTO: 31.8 PG (ref 26.5–33)
MCHC RBC AUTO-ENTMCNC: 29.9 G/DL (ref 31.5–36.5)
MCV RBC AUTO: 106 FL (ref 78–100)
PLATELET # BLD AUTO: 197 10E9/L (ref 150–450)
POTASSIUM SERPL-SCNC: 4.6 MMOL/L (ref 3.4–5.3)
PROT SERPL-MCNC: 6.2 G/DL (ref 6.8–8.8)
RBC # BLD AUTO: 2.67 10E12/L (ref 3.8–5.2)
SODIUM SERPL-SCNC: 140 MMOL/L (ref 133–144)
WBC # BLD AUTO: 4.3 10E9/L (ref 4–11)

## 2019-09-29 PROCEDURE — 80053 COMPREHEN METABOLIC PANEL: CPT | Performed by: PEDIATRICS

## 2019-09-29 PROCEDURE — 25000132 ZZH RX MED GY IP 250 OP 250 PS 637: Performed by: PHYSICIAN ASSISTANT

## 2019-09-29 PROCEDURE — 12800006 ZZH R&B REHAB

## 2019-09-29 PROCEDURE — 71046 X-RAY EXAM CHEST 2 VIEWS: CPT

## 2019-09-29 PROCEDURE — 25000128 H RX IP 250 OP 636: Performed by: HOSPITALIST

## 2019-09-29 PROCEDURE — 25000132 ZZH RX MED GY IP 250 OP 250 PS 637: Performed by: HOSPITALIST

## 2019-09-29 PROCEDURE — 97110 THERAPEUTIC EXERCISES: CPT | Mod: GP

## 2019-09-29 PROCEDURE — 97530 THERAPEUTIC ACTIVITIES: CPT | Mod: GO

## 2019-09-29 PROCEDURE — 85027 COMPLETE CBC AUTOMATED: CPT | Performed by: PEDIATRICS

## 2019-09-29 PROCEDURE — 25000132 ZZH RX MED GY IP 250 OP 250 PS 637: Performed by: INTERNAL MEDICINE

## 2019-09-29 PROCEDURE — 99232 SBSQ HOSP IP/OBS MODERATE 35: CPT | Performed by: INTERNAL MEDICINE

## 2019-09-29 PROCEDURE — 97116 GAIT TRAINING THERAPY: CPT | Mod: GP

## 2019-09-29 PROCEDURE — 36415 COLL VENOUS BLD VENIPUNCTURE: CPT | Performed by: PEDIATRICS

## 2019-09-29 PROCEDURE — 25000131 ZZH RX MED GY IP 250 OP 636 PS 637: Performed by: INTERNAL MEDICINE

## 2019-09-29 PROCEDURE — 25000131 ZZH RX MED GY IP 250 OP 636 PS 637: Performed by: PHYSICAL MEDICINE & REHABILITATION

## 2019-09-29 PROCEDURE — 25000131 ZZH RX MED GY IP 250 OP 636 PS 637: Performed by: HOSPITALIST

## 2019-09-29 PROCEDURE — 97127 ZZHC SP THERAPEUTIC INTERVENTION W/FOCUS ON COGNITIVE FUNCTION,EA 15 MIN: CPT | Mod: GN | Performed by: SPEECH-LANGUAGE PATHOLOGIST

## 2019-09-29 RX ADMIN — VALGANCICLOVIR 450 MG: 450 TABLET, FILM COATED ORAL at 08:58

## 2019-09-29 RX ADMIN — MYCOPHENOLIC ACID 720 MG: 360 TABLET, DELAYED RELEASE ORAL at 08:58

## 2019-09-29 RX ADMIN — ZINC SULFATE CAP 220 MG (50 MG ELEMENTAL ZN) 220 MG: 220 (50 ZN) CAP at 08:58

## 2019-09-29 RX ADMIN — HEPARIN SODIUM 5000 UNITS: 5000 INJECTION, SOLUTION INTRAVENOUS; SUBCUTANEOUS at 13:58

## 2019-09-29 RX ADMIN — FAMOTIDINE 20 MG: 20 TABLET, FILM COATED ORAL at 08:58

## 2019-09-29 RX ADMIN — HEPARIN SODIUM 5000 UNITS: 5000 INJECTION, SOLUTION INTRAVENOUS; SUBCUTANEOUS at 21:12

## 2019-09-29 RX ADMIN — ONDANSETRON 4 MG: 4 TABLET, ORALLY DISINTEGRATING ORAL at 06:30

## 2019-09-29 RX ADMIN — LEVOTHYROXINE SODIUM 175 MCG: 50 TABLET ORAL at 08:58

## 2019-09-29 RX ADMIN — MELATONIN 1000 UNITS: at 21:13

## 2019-09-29 RX ADMIN — ALUMINUM HYDROXIDE, MAGNESIUM HYDROXIDE, AND DIMETHICONE 30 ML: 400; 400; 40 SUSPENSION ORAL at 15:41

## 2019-09-29 RX ADMIN — DAPSONE 100 MG: 100 TABLET ORAL at 08:58

## 2019-09-29 RX ADMIN — OXYCODONE HYDROCHLORIDE AND ACETAMINOPHEN 500 MG: 500 TABLET ORAL at 08:58

## 2019-09-29 RX ADMIN — CYCLOSPORINE 225 MG: 100 CAPSULE ORAL at 08:58

## 2019-09-29 RX ADMIN — ASPIRIN 325 MG ORAL TABLET 325 MG: 325 PILL ORAL at 08:58

## 2019-09-29 RX ADMIN — HEPARIN SODIUM 5000 UNITS: 5000 INJECTION, SOLUTION INTRAVENOUS; SUBCUTANEOUS at 06:31

## 2019-09-29 RX ADMIN — MELATONIN 1000 UNITS: at 08:58

## 2019-09-29 RX ADMIN — CYCLOSPORINE 225 MG: 100 CAPSULE ORAL at 17:43

## 2019-09-29 RX ADMIN — MYCOPHENOLIC ACID 720 MG: 360 TABLET, DELAYED RELEASE ORAL at 21:13

## 2019-09-29 RX ADMIN — MULTIPLE VITAMINS W/ MINERALS TAB 1 TABLET: TAB at 08:58

## 2019-09-29 RX ADMIN — MAGNESIUM OXIDE TAB 400 MG (241.3 MG ELEMENTAL MG) 400 MG: 400 (241.3 MG) TAB at 08:58

## 2019-09-29 RX ADMIN — MELATONIN TAB 3 MG 3 MG: 3 TAB at 21:13

## 2019-09-29 ASSESSMENT — MIFFLIN-ST. JEOR: SCORE: 1375.57

## 2019-09-29 NOTE — PLAN OF CARE
OT- pt reports being very fatigued upon OT arrival. Agrees to participate after some encouragement from OT. ambulated to OT gym with one sitting rest break on the way with FWW and SBA. requires rest breaks due to pain in stomach, pain and fatigue. performed various BUE functional tasks sitting at a table with 2lb wrist weights on to help increase activity tolerance. ambulated  back to room without rest break. Pt reports morning ADLs went well without A this AM. Tolerated entire session with frequent, prolonged rest breaks

## 2019-09-29 NOTE — PROGRESS NOTES
"[facility], [place]   Internal Medicine Daily Note           Interval History/Events     Overnight event reviewed  Feeling well this morning  Denies any chest pain, shortness of breath, cough, running nose.   Feels shortness of breath every now, and then with activity  Denies any dizziness. She was up and walking this morning  No issues with urination  PO intake low yesterday  No fever, chills.        Review of Systems        4 point ROS including Respiratory, CV, GI and , other than that noted above is negative      Medications   I have reviewed current medications  in the \"current medication\" section of Epic.  Relevant changes include:     Physical Exam   General:       Vital signs:    Blood pressure 105/60, pulse 80, temperature 97.7  F (36.5  C), temperature source Oral, resp. rate 16, height 1.575 m (5' 2\"), weight 84.7 kg (186 lb 12.8 oz), SpO2 95 %, not currently breastfeeding.  Estimated body mass index is 34.17 kg/m  as calculated from the following:    Height as of this encounter: 1.575 m (5' 2\").    Weight as of this encounter: 84.7 kg (186 lb 12.8 oz).      Intake/Output Summary (Last 24 hours) at 9/27/2019 1052  Last data filed at 9/27/2019 0045  Gross per 24 hour   Intake 490 ml   Output 500 ml   Net -10 ml      HEENT: No icterus, no pallor  Cardiovascular: S1, S2 normal  Respiratory:  B/L CTA  GI/Abdomen: Soft, NT, BS+. Surgical marks with no sign of infection  Neurology: Alert, awake, and oriented. No tremors.   Extremities: B/l  pretibial edema.   Skin:      Laboratory and Imaging Studies     I have reviewed  laboratory and imaging studies in the Epic. Pertinent findings are as below:    BMP  Recent Labs   Lab 09/29/19  0533 09/26/19  0644 09/24/19  1106 09/23/19  0602    139 138 138   POTASSIUM 4.6 4.6 5.0 4.7   CHLORIDE 102 102 99 103   JACOB 8.9 8.7 9.4 9.3   CO2 33* 29 33* 31   BUN 32* 35* 39* 36*   CR 1.02 0.86 0.96 0.78   GLC 93 82 94 104*     CBC  Recent " Labs   Lab 19  0533 19  0644 19  0602   WBC 4.3 4.5 4.9   RBC 2.67* 2.61* 2.52*   HGB 8.5* 8.2* 7.9*   HCT 28.4* 27.2* 26.5*   * 104* 105*   MCH 31.8 31.4 31.3   MCHC 29.9* 30.1* 29.8*   RDW 21.0* 20.5* 19.3*    200 213     INRNo lab results found in last 7 days.  LFTs  Recent Labs   Lab 19  0533 19  0644 19  1106 19  0602   ALKPHOS 196* 222* 251* 230*   AST 29 44 51* 52*   ALT 40 53* 61* 57*   BILITOTAL 1.5* 1.3 1.5* 1.0   PROTTOTAL 6.2* 5.7* 6.3* 5.6*   ALBUMIN 2.8* 2.4* 2.7* 2.3*      PANCNo lab results found in last 7 days.        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  Underwent  donor liver transplant 2019.  Post operative course complicated by toxic and metabolic encephalopathy, C-Diff diarrhea, hyperkalemia, steroid induced hyperglycemia,  impaired oral intake, nausea, epistaxis,  acute blood loss anemia, and  Weakness. Extensive work up done for encephalopathy. It has been slowly resolving  She is on oral vancomycin for cdiff. She is transferred to rehab.       # ANGULO, Heterozygote for Alpha 1 Antitrypsin Deficiency, S/P Liver Transplant on 19:   Graft functioning well.  Mild increase in LFT's  On the discharge day and hence Mycophenolate was increased from 500 BID to 720  Immunosuppression: mycophenolate (Myfortic 720mg BID) Cyclosporine 225 mg BID.  Tacrolimus was discontinued on  due to prolonged delirium.   Transplant team note reviewed from 2019  ID Prophylaxis: dapsone (x6 months), valganciclovir (x12 weeks), Mycelex completed inpatient.    Transplant coordinator Zina [phone].  Biliary stent:  No stent on AXR 19  Donor status:  DBD  CMV D- / R +  EBV D + / R +  - Continue current regimen  - Follow up labs for     # Dizziness: Reports  intermittent issues with dizziness. Defined as spinning sensation. Orthostatic vitals are stable. Not present during evaluation. No hearing diffiuculty. No tinnitus. No focal neuro deficit. Doubt cardiovascular etiology  No issues in last 24 hours  - Meclizine PRN  - Continue to monitor for now     # C Diff Colitis:   Started on oral Vanc on 09/17/19. Completed 2 weeks treatment on 09/26.       # Toxic and Metabolic Encephalopathy: ( Resolved)    Has resolved. Tacro stopped and several days later encephalopathy improved.  Was seen by neurology, had work up done in patient at [facility].     # Acute blood loss anemia on Anemia of chronic disease. Hb 7.9.  Ferritin high, and TSAT 17. Suggestive of Iron deficiency state.  daily iron 325 mg po daily. Folate, Vit B 12 was fine.   - CBC  M/Thu        #  Hypothyroidism: On Levothyroxine.   - Continue      # Severe Malnutrition: On TF, Tx  per primary team.      #  Hypoxemia at night: Intermittent in nature. XR from early September had some pulmonary edema, and pleural effusion  On 09/29, patient denies any dyspnea, orthopnea, cough, chest.   Likely secondary to atelectasis ? DIXIE  - Advised patient to use incentive spirometry  - XR chest   - Consider ON oximetery, outpatient sleep study If continues to have nocturnal hypoxia.         # Diet: Orders Placed This Encounter      Regular Diet Adult     # DVT Prophylaxis: Heparin SQ  # Forrester Catheter: not present  # Code Status: Full Code        Pt's care was discussed with bedside RN, patient and  during Care Team Rounds.               Josué Jimenez MD  Hospitalist ( Internal medicine)  Pager: [phone]

## 2019-09-29 NOTE — PLAN OF CARE
FOCUS/GOAL  Medical management    ASSESSMENT, INTERVENTIONS AND CONTINUING PLAN FOR GOAL:  Pt denied pain during shift. Denied SOB, but pt requesting PRN oxygen when completing activity - 1 L O2 - 94% O2 sats. Chest x-ray completed per Hospitalist order - see results - no new orders per Hospitalist. Encouraged IS use and provided education. Pt using call light appropriately to express needs. Pt mod I in room with walker. Overall fatigue per pt, unchanged since hospitalization per pt. Incision CDI, approximated. Continue with POC.

## 2019-09-29 NOTE — PROGRESS NOTES
"  [facility]   Acute Rehabilitation Unit  Daily progress note     INTERVAL HISTORY  Nicci Pemberton was seen resting in bed.  She desaturated the night before so last night she was on supplemental O2 at 2l/min. No report of desaturation last night.  She feels very tired today. Discussed with her the importance of pacing her activities and getting short rest breaks as needed. She denies fevers, denies issues with bowel or bladder, but appetite is still poor.      MEDICATIONS    aspirin  325 mg Oral Daily     cycloSPORINE modified  225 mg Oral BID IS     dapsone  100 mg Oral Daily     famotidine  20 mg Oral Daily     heparin sodium  5,000 Units Subcutaneous Q8H     levothyroxine  175 mcg Oral Daily     lidocaine  2 patch Transdermal Q24h     And     lidocaine   Transdermal Q24H     And     lidocaine   Transdermal Q8H     magnesium oxide  400 mg Oral Daily     melatonin  3 mg Oral At Bedtime     multivitamin w/minerals  1 tablet Oral Daily     mycophenolic acid  720 mg Oral BID     ondansetron  4 mg Oral QAM AC     valGANciclovir  450 mg Oral Daily     vitamin C  500 mg Oral Daily     vitamin D3  1,000 Units Oral BID     zinc sulfate  220 mg Oral Daily        acetaminophen, acetaminophen-caffeine, alum & mag hydroxide-simethicone, alum & mag hydroxide-simethicone, ondansetron, oxymetazoline, - MEDICATION INSTRUCTIONS -, polyethylene glycol, senna-docusate, simethicone, sodium chloride      PHYSICAL EXAM  /60 (BP Location: Left arm)   Pulse 80   Temp 97.7  F (36.2  C) (Oral)   Resp 16   Ht 1.575 m (5' 2\")   Wt 85.3 kg (188 lb 1.6 oz)   SpO2 95%   BMI 34.40 kg/m    Gen: awake fatigued. Lying in bed comfortably  HEENT: NC,AT  Cardio: rrr  Pulm: non labored   Abd: soft   Ext: ble in lymphedema wraps  Neuro/MSK: alert speech clear moves all extremities     LABS        Lab Results   Component Value Date     WBC 4.5 09/26/2019            Lab Results   Component Value Date     " RBC 2.61 09/26/2019            Lab Results   Component Value Date     HGB 8.2 09/26/2019            Lab Results   Component Value Date     HCT 27.2 09/26/2019            Lab Results   Component Value Date      09/26/2019            Lab Results   Component Value Date     MCH 31.4 09/26/2019            Lab Results   Component Value Date     MCHC 30.1 09/26/2019            Lab Results   Component Value Date     RDW 20.5 09/26/2019            Lab Results   Component Value Date      09/26/2019         Last Comprehensive Metabolic Panel:        Sodium   Date Value Ref Range Status   09/26/2019 139 133 - 144 mmol/L Final            Potassium   Date Value Ref Range Status   09/26/2019 4.6 3.4 - 5.3 mmol/L Final            Chloride   Date Value Ref Range Status   09/26/2019 102 94 - 109 mmol/L Final            Carbon Dioxide   Date Value Ref Range Status   09/26/2019 29 20 - 32 mmol/L Final            Anion Gap   Date Value Ref Range Status   09/26/2019 8 3 - 14 mmol/L Final            Glucose   Date Value Ref Range Status   09/26/2019 82 70 - 99 mg/dL Final            Urea Nitrogen   Date Value Ref Range Status   09/26/2019 35 (H) 7 - 30 mg/dL Final            Creatinine   Date Value Ref Range Status   09/26/2019 0.86 0.52 - 1.04 mg/dL Final      GFR Estimate   Date Value Ref Range Status   09/26/2019 73 >60 mL/min/[1.73_m2] Final       Comment:       Non  GFR Calc  Starting 12/18/2018, serum creatinine based estimated GFR (eGFR) will be   calculated using the Chronic Kidney Disease Epidemiology Collaboration   (CKD-EPI) equation.               Calcium   Date Value Ref Range Status   09/26/2019 8.7 8.5 - 10.1 mg/dL Final            Rehabilitation - continue comprehensive acute inpatient rehabilitation program with multidisciplinary approach including therapies, rehab nursing, and physiatry following. See interval history for updates.       ASSESSMENT AND PLAN     Nicci Pemberton is a 59 year  old woman with a past medical history of HTN, hypothyroidism, and end stage liver disease secondary to ANGULO and heterozygous alpha-1 antitrypsin deficiency admitted to [facility]  in decompensated liver failure underwent liver transplant , admitted to  ARU for ongoing rehabilitation and medical management 2019.      ESLD 2/2 ANGULO & heterozygous alpha-1 antitrypsin deficiency s/p  donor liver transplant . LFTs down trending  T bili WNL, , ALT 53 AST 44  -appreciate ongoing assistance per transplant and hospitalist service  -no lifting >10 pounds  -transplant coordinator: Zina [phone]  F/u transplant surgeon, transplant hepatology  - CBC, CMP, mag, phos, and CSA levels (12 hours after administration) to be drawn every Monday and Thursday   -Immunosuppression: myfortic, cyclosporine (goal 200-300)  -Prophylaxis: dapsone x 6 months, valacyclovir x 12 weeks  -ongoing ab pain- monitor     Toxic and metabolic encephalopathy- multifactorial: meds, sleep, illness, MRI brain 9/3 neg.  Evaluated by neurology  with workup. .  -continue to monitor  -SLP consult for cog eval  -delirium precautions ordered     C-Diff- +   -continue po vanco      Acute blood loss anemia- with anemia of chronic disease.  Last transfused .  Hgb stable 8.2    -trend Mon/Thurs     Severe Malnutrition - acute on chronic illness s/p ND  per IR.  Goal to taper off and discontinue TF prior to ARU discharge. Clogged   Removed with trial po intake  marcie count 1328  -appreciate nutrition consult  -marcie counts  -encourage po intake     Medical Device Related Pressure Injury (MDRPI) due to Bridle string  Pressure Injury  Stage 2   -wound care as ordered     Hypothyroidism- continue synthroid        1. Adjustment to disability:  frustrated  2. FEN: reg + TF with poor intake  3. Bowel: continent  4. Bladder: continent  5. DVT Prophylaxis: ambulation  6. GI Prophylaxis:ppi  7. Code:  full  8. Disposition: home ~ 7 days   9. Follow up Appointments on Discharge: hepatology, transplant surgery        I, Dr. Dewey, have seen and examined the patient.   Total time: >15 min evaluating the patient and coordinating care     Ankur Dewey MD, PhD

## 2019-09-29 NOTE — PLAN OF CARE
FOCUS/GOAL  Bowel management, Bladder management, Pain management and Cognition/Memory/Judgment/Problem solving    ASSESSMENT, INTERVENTIONS AND CONTINUING PLAN FOR GOAL:  Pt is alert and oriented x4, denies fever, chills, CP, SOB, N/V, abdominal pain, dizziness, lighheadedness or new weakness/numbness/tingling, continent of bowel and bladder,juan in room but called during the night with assistance to the bathroom, on O2 overnight to prevent previous nights desaturation episode, sleeping well throughout the night, no tubes, lines or drains, no further care concerns at this time continue with POC.

## 2019-09-29 NOTE — PLAN OF CARE
Pt limited by fatigue but able to participate in PM. [place] day tomorrow. Requested that pt call her  tonight to ask about whether she needs a FWW for home or not. Will check back with her tomorrow on status of FWW.

## 2019-09-29 NOTE — PLAN OF CARE
FOCUS/GOAL  Medical management, Skin integrity, and Safety management    ASSESSMENT, INTERVENTIONS AND CONTINUING PLAN FOR GOAL:  Pt is alert and oriented x4. VSS. Pt was off oxygen during meals and when visiting with family for over 3 hours this evening, 02 sats above 90's, but at HS while sleeping sats dropped to upper 80's. She's currently on 2L nasal canula, 02 sats at upper 90's. On call MD notified about pt's dropping 02 sats.Denied nausea/dizziness/SOB. Reported headache before bedtime, Tylenol PRN given with some relief. She continent of bowel and bladder, LBM today 9/28. She has poor appetite, drinks nepro supplements and a few sips of water. Pt is mod I in room, but this evening SBA due to dizziness in AM shift. Call light within reach, will continue to monitor as per POC.

## 2019-09-29 NOTE — PLAN OF CARE
SLP: pt needing min assist with moderate complex reasoning, sequencing tasks, noting increased processing time.  She is able to demonstrate new learning over time.

## 2019-09-30 LAB
ALBUMIN SERPL-MCNC: 2.7 G/DL (ref 3.4–5)
ALP SERPL-CCNC: 179 U/L (ref 40–150)
ALT SERPL W P-5'-P-CCNC: 34 U/L (ref 0–50)
ANION GAP SERPL CALCULATED.3IONS-SCNC: 3 MMOL/L (ref 3–14)
AST SERPL W P-5'-P-CCNC: 26 U/L (ref 0–45)
BILIRUB DIRECT SERPL-MCNC: 0.8 MG/DL (ref 0–0.2)
BILIRUB SERPL-MCNC: 1.4 MG/DL (ref 0.2–1.3)
BUN SERPL-MCNC: 33 MG/DL (ref 7–30)
CALCIUM SERPL-MCNC: 9.2 MG/DL (ref 8.5–10.1)
CHLORIDE SERPL-SCNC: 102 MMOL/L (ref 94–109)
CO2 SERPL-SCNC: 34 MMOL/L (ref 20–32)
CREAT SERPL-MCNC: 1.06 MG/DL (ref 0.52–1.04)
CYCLOSPORINE BLD LC/MS/MS-MCNC: 282 UG/L (ref 50–400)
ERYTHROCYTE [DISTWIDTH] IN BLOOD BY AUTOMATED COUNT: 21 % (ref 10–15)
GFR SERPL CREATININE-BSD FRML MDRD: 57 ML/MIN/{1.73_M2}
GLUCOSE SERPL-MCNC: 79 MG/DL (ref 70–99)
HCT VFR BLD AUTO: 26.8 % (ref 35–47)
HGB BLD-MCNC: 8.1 G/DL (ref 11.7–15.7)
MAGNESIUM SERPL-MCNC: 1.9 MG/DL (ref 1.6–2.3)
MCH RBC QN AUTO: 32.3 PG (ref 26.5–33)
MCHC RBC AUTO-ENTMCNC: 30.2 G/DL (ref 31.5–36.5)
MCV RBC AUTO: 107 FL (ref 78–100)
PHOSPHATE SERPL-MCNC: 4.7 MG/DL (ref 2.5–4.5)
PLATELET # BLD AUTO: 170 10E9/L (ref 150–450)
POTASSIUM SERPL-SCNC: 4.6 MMOL/L (ref 3.4–5.3)
PROT SERPL-MCNC: 5.9 G/DL (ref 6.8–8.8)
RBC # BLD AUTO: 2.51 10E12/L (ref 3.8–5.2)
SODIUM SERPL-SCNC: 139 MMOL/L (ref 133–144)
TME LAST DOSE: NORMAL H
WBC # BLD AUTO: 3.2 10E9/L (ref 4–11)

## 2019-09-30 PROCEDURE — 25000131 ZZH RX MED GY IP 250 OP 636 PS 637: Performed by: HOSPITALIST

## 2019-09-30 PROCEDURE — 97116 GAIT TRAINING THERAPY: CPT | Mod: GP

## 2019-09-30 PROCEDURE — 36415 COLL VENOUS BLD VENIPUNCTURE: CPT | Performed by: PHYSICIAN ASSISTANT

## 2019-09-30 PROCEDURE — 97110 THERAPEUTIC EXERCISES: CPT | Mod: GO | Performed by: OCCUPATIONAL THERAPIST

## 2019-09-30 PROCEDURE — 25000131 ZZH RX MED GY IP 250 OP 636 PS 637: Performed by: INTERNAL MEDICINE

## 2019-09-30 PROCEDURE — 80076 HEPATIC FUNCTION PANEL: CPT | Performed by: PHYSICIAN ASSISTANT

## 2019-09-30 PROCEDURE — 25000132 ZZH RX MED GY IP 250 OP 250 PS 637: Performed by: PHYSICAL MEDICINE & REHABILITATION

## 2019-09-30 PROCEDURE — 80048 BASIC METABOLIC PNL TOTAL CA: CPT | Performed by: PHYSICIAN ASSISTANT

## 2019-09-30 PROCEDURE — 25000132 ZZH RX MED GY IP 250 OP 250 PS 637: Performed by: HOSPITALIST

## 2019-09-30 PROCEDURE — 80158 DRUG ASSAY CYCLOSPORINE: CPT | Performed by: PHYSICIAN ASSISTANT

## 2019-09-30 PROCEDURE — 97127 ZZHC SP THERAPEUTIC INTERVENTION W/FOCUS ON COGNITIVE FUNCTION,EA 15 MIN: CPT | Mod: GN

## 2019-09-30 PROCEDURE — 85027 COMPLETE CBC AUTOMATED: CPT | Performed by: PHYSICIAN ASSISTANT

## 2019-09-30 PROCEDURE — 97535 SELF CARE MNGMENT TRAINING: CPT | Mod: GO | Performed by: OCCUPATIONAL THERAPIST

## 2019-09-30 PROCEDURE — 25000132 ZZH RX MED GY IP 250 OP 250 PS 637: Performed by: PHYSICIAN ASSISTANT

## 2019-09-30 PROCEDURE — 92507 TX SP LANG VOICE COMM INDIV: CPT | Mod: GN

## 2019-09-30 PROCEDURE — 84100 ASSAY OF PHOSPHORUS: CPT | Performed by: PHYSICIAN ASSISTANT

## 2019-09-30 PROCEDURE — 25000131 ZZH RX MED GY IP 250 OP 636 PS 637: Performed by: PHYSICAL MEDICINE & REHABILITATION

## 2019-09-30 PROCEDURE — 97530 THERAPEUTIC ACTIVITIES: CPT | Mod: GP

## 2019-09-30 PROCEDURE — 25000128 H RX IP 250 OP 636: Performed by: HOSPITALIST

## 2019-09-30 PROCEDURE — 12800006 ZZH R&B REHAB

## 2019-09-30 PROCEDURE — 99232 SBSQ HOSP IP/OBS MODERATE 35: CPT | Performed by: INTERNAL MEDICINE

## 2019-09-30 PROCEDURE — 83735 ASSAY OF MAGNESIUM: CPT | Performed by: PHYSICIAN ASSISTANT

## 2019-09-30 RX ORDER — CYCLOSPORINE 25 MG/1
CAPSULE, GELATIN COATED ORAL
Qty: 30 CAPSULE | Refills: 0 | Status: SHIPPED | OUTPATIENT
Start: 2019-09-30 | End: 2019-10-01

## 2019-09-30 RX ORDER — ASPIRIN 325 MG
325 TABLET ORAL DAILY
Qty: 30 TABLET | Refills: 0 | Status: SHIPPED | OUTPATIENT
Start: 2019-09-30 | End: 2019-10-14

## 2019-09-30 RX ORDER — SIMETHICONE 80 MG
80 TABLET,CHEWABLE ORAL EVERY 6 HOURS PRN
COMMUNITY
Start: 2019-09-30 | End: 2019-11-19

## 2019-09-30 RX ORDER — CYCLOSPORINE 100 MG/1
CAPSULE ORAL
Qty: 60 CAPSULE | Refills: 0 | Status: SHIPPED | OUTPATIENT
Start: 2019-09-30 | End: 2019-10-01

## 2019-09-30 RX ORDER — DAPSONE 100 MG/1
100 TABLET ORAL DAILY
Qty: 30 TABLET | Refills: 0 | Status: SHIPPED | OUTPATIENT
Start: 2019-09-30 | End: 2019-10-29

## 2019-09-30 RX ORDER — ZINC SULFATE 50(220)MG
220 CAPSULE ORAL DAILY
Qty: 30 CAPSULE | Refills: 0 | Status: SHIPPED | OUTPATIENT
Start: 2019-09-30 | End: 2019-11-04

## 2019-09-30 RX ORDER — VALGANCICLOVIR 450 MG/1
450 TABLET, FILM COATED ORAL DAILY
Qty: 30 TABLET | Refills: 0 | Status: SHIPPED | OUTPATIENT
Start: 2019-09-30 | End: 2019-10-29

## 2019-09-30 RX ORDER — MYCOPHENOLIC ACID 360 MG/1
720 TABLET, DELAYED RELEASE ORAL 2 TIMES DAILY
Qty: 120 TABLET | Refills: 0 | Status: ON HOLD | OUTPATIENT
Start: 2019-09-30 | End: 2019-10-04

## 2019-09-30 RX ORDER — LEVOTHYROXINE SODIUM 175 UG/1
175 TABLET ORAL DAILY
Qty: 30 TABLET | Refills: 0 | Status: SHIPPED | OUTPATIENT
Start: 2019-09-30 | End: 2019-11-18

## 2019-09-30 RX ORDER — MAGNESIUM OXIDE 400 MG/1
400 TABLET ORAL DAILY
Qty: 30 TABLET | Refills: 0 | Status: SHIPPED | OUTPATIENT
Start: 2019-10-01 | End: 2019-10-29

## 2019-09-30 RX ORDER — ASCORBIC ACID 500 MG
500 TABLET ORAL DAILY
Qty: 30 TABLET | Refills: 0 | Status: SHIPPED | OUTPATIENT
Start: 2019-09-30 | End: 2019-11-04

## 2019-09-30 RX ORDER — CHOLECALCIFEROL (VITAMIN D3) 25 MCG
1000 CAPSULE ORAL 2 TIMES DAILY
Qty: 60 CAPSULE | Refills: 0 | Status: SHIPPED | OUTPATIENT
Start: 2019-09-30 | End: 2019-11-04

## 2019-09-30 RX ORDER — FAMOTIDINE 20 MG/1
20 TABLET, FILM COATED ORAL DAILY
Qty: 30 TABLET | Refills: 0 | Status: SHIPPED | OUTPATIENT
Start: 2019-09-30 | End: 2019-10-14

## 2019-09-30 RX ADMIN — ZINC SULFATE CAP 220 MG (50 MG ELEMENTAL ZN) 220 MG: 220 (50 ZN) CAP at 08:36

## 2019-09-30 RX ADMIN — ASPIRIN 325 MG ORAL TABLET 325 MG: 325 PILL ORAL at 08:34

## 2019-09-30 RX ADMIN — FAMOTIDINE 20 MG: 20 TABLET, FILM COATED ORAL at 08:38

## 2019-09-30 RX ADMIN — CYCLOSPORINE 225 MG: 100 CAPSULE ORAL at 18:24

## 2019-09-30 RX ADMIN — MYCOPHENOLIC ACID 720 MG: 360 TABLET, DELAYED RELEASE ORAL at 08:37

## 2019-09-30 RX ADMIN — ONDANSETRON 4 MG: 4 TABLET, ORALLY DISINTEGRATING ORAL at 06:24

## 2019-09-30 RX ADMIN — LEVOTHYROXINE SODIUM 175 MCG: 50 TABLET ORAL at 08:36

## 2019-09-30 RX ADMIN — MULTIPLE VITAMINS W/ MINERALS TAB 1 TABLET: TAB at 08:37

## 2019-09-30 RX ADMIN — DAPSONE 100 MG: 100 TABLET ORAL at 08:37

## 2019-09-30 RX ADMIN — MELATONIN 1000 UNITS: at 08:36

## 2019-09-30 RX ADMIN — MAGNESIUM OXIDE TAB 400 MG (241.3 MG ELEMENTAL MG) 400 MG: 400 (241.3 MG) TAB at 08:37

## 2019-09-30 RX ADMIN — MYCOPHENOLIC ACID 720 MG: 360 TABLET, DELAYED RELEASE ORAL at 21:30

## 2019-09-30 RX ADMIN — CYCLOSPORINE 225 MG: 100 CAPSULE ORAL at 08:34

## 2019-09-30 RX ADMIN — MELATONIN 1000 UNITS: at 21:30

## 2019-09-30 RX ADMIN — VALGANCICLOVIR 450 MG: 450 TABLET, FILM COATED ORAL at 08:36

## 2019-09-30 RX ADMIN — HEPARIN SODIUM 5000 UNITS: 5000 INJECTION, SOLUTION INTRAVENOUS; SUBCUTANEOUS at 06:24

## 2019-09-30 RX ADMIN — MELATONIN TAB 3 MG 3 MG: 3 TAB at 21:30

## 2019-09-30 RX ADMIN — OXYCODONE HYDROCHLORIDE AND ACETAMINOPHEN 500 MG: 500 TABLET ORAL at 08:37

## 2019-09-30 ASSESSMENT — MIFFLIN-ST. JEOR: SCORE: 1405.96

## 2019-09-30 NOTE — PROGRESS NOTES
"[facility], [place]   Internal Medicine Daily Note           Interval History/Events     Overnight event reviewed  Reports doing well  Had big BM this morning  No burning urination  Appetite not great, but trying to eat more  Some fullness in abdomen mostly with eating foot  No chest pain, shortness of breath.        Review of Systems        4 point ROS including Respiratory, CV, GI and , other than that noted above is negative      Medications   I have reviewed current medications  in the \"current medication\" section of Epic.  Relevant changes include:     Physical Exam   General:       Vital signs:    Blood pressure 94/62, pulse 73, temperature 96.9  F (36.1  C), temperature source Oral, resp. rate 16, height 1.575 m (5' 2\"), weight 87.8 kg (193 lb 8 oz), SpO2 92 %, not currently breastfeeding.  Estimated body mass index is 35.39 kg/m  as calculated from the following:    Height as of this encounter: 1.575 m (5' 2\").    Weight as of this encounter: 87.8 kg (193 lb 8 oz).      Intake/Output Summary (Last 24 hours) at 9/27/2019 1052  Last data filed at 9/27/2019 0045  Gross per 24 hour   Intake 490 ml   Output 500 ml   Net -10 ml      HEENT: No icterus, no pallor  Cardiovascular: S1, S2 normal  Respiratory:  B/L CTA  GI/Abdomen: Soft, NT, BS+. Surgical marks with no sign of infection  Neurology: Alert, awake, and oriented. No tremors.   Extremities: B/l  pretibial edema.   Skin:      Laboratory and Imaging Studies     I have reviewed  laboratory and imaging studies in the Epic. Pertinent findings are as below:    BMP  Recent Labs   Lab 09/30/19  0714 09/29/19  0533 09/26/19  0644 09/24/19  1106    140 139 138   POTASSIUM 4.6 4.6 4.6 5.0   CHLORIDE 102 102 102 99   JACOB 9.2 8.9 8.7 9.4   CO2 34* 33* 29 33*   BUN 33* 32* 35* 39*   CR 1.06* 1.02 0.86 0.96   GLC 79 93 82 94     CBC  Recent Labs   Lab 09/30/19  0714 09/29/19  0533 09/26/19  0644   WBC 3.2* 4.3 4.5   RBC 2.51* 2.67* " 2.61*   HGB 8.1* 8.5* 8.2*   HCT 26.8* 28.4* 27.2*   * 106* 104*   MCH 32.3 31.8 31.4   MCHC 30.2* 29.9* 30.1*   RDW 21.0* 21.0* 20.5*    197 200     INRNo lab results found in last 7 days.  LFTs  Recent Labs   Lab 19  0714 19  0533 19  0644 19  1106   ALKPHOS 179* 196* 222* 251*   AST 26 29 44 51*   ALT 34 40 53* 61*   BILITOTAL 1.4* 1.5* 1.3 1.5*   PROTTOTAL 5.9* 6.2* 5.7* 6.3*   ALBUMIN 2.7* 2.8* 2.4* 2.7*      PANCNo lab results found in last 7 days.        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  Underwent  donor liver transplant 2019.  Post operative course complicated by toxic and metabolic encephalopathy, C-Diff diarrhea, hyperkalemia, steroid induced hyperglycemia,  impaired oral intake, nausea, epistaxis,  acute blood loss anemia, and  Weakness. Extensive work up done for encephalopathy. It has been slowly resolving  She is on oral vancomycin for cdiff. She is transferred to rehab.       # ANGULO, Heterozygote for Alpha 1 Antitrypsin Deficiency, S/P Liver Transplant on 19:   Graft functioning well.  Mild increase in LFT's  On the discharge day and hence Mycophenolate was increased from 500 BID to 720  Immunosuppression: mycophenolate (Myfortic 720mg BID) Cyclosporine 225 mg BID.  Tacrolimus was discontinued on  due to prolonged delirium.   Transplant team note reviewed from 2019  ID Prophylaxis: dapsone (x6 months), valganciclovir (x12 weeks), Mycelex completed inpatient.    Biliary stent:  No stent on AXR 19  Bilirubin 1.4, ALP improving up to 179, AST, and ALT normal.   - Continue  CSA, MMF  - Continue ID regimen of Dapsone, Valganciclovir  - Follow up with Transplant team.     # Dizziness: Reports intermittent issues with dizziness. Defined as spinning sensation.  Orthostatic vitals are stable. Not present during evaluation. No hearing diffiuculty. No tinnitus. No focal neuro deficit. Doubt cardiovascular etiology  No issues in last 48 hours  - Meclizine PRN  - Continue to monitor for now     # C Diff Colitis:   Started on oral Vanc on 09/17/19. Completed 2 weeks treatment on 09/26.       # Toxic and Metabolic Encephalopathy: ( Resolved)    Has resolved. Tacro stopped and several days later encephalopathy improved.  Was seen by neurology, had work up done in patient at [facility].     # Acute blood loss anemia on Anemia of chronic disease. Hb 7.9.  Ferritin high, and TSAT 17. Suggestive of Iron deficiency state.  Folate, Vit B 12 normal.   Hg 8.1 gm/dl.   - Continue Ferrous sulfate  - CBC  M/Thu      #  Hypothyroidism: On Levothyroxine.   - Continue      # Severe Malnutrition: On TF. Management  per primary team.      #  Hypoxemia at night: Intermittent in nature. XR from early September had some pulmonary edema, and pleural effusion  XR chest on 09/29 showed moderate Right pleural effusion.   Patient asymptomatic  - Advised patient to use incentive spirometry     # Diet: Orders Placed This Encounter      Regular Diet Adult     # DVT Prophylaxis: Heparin SQ  # Forrester Catheter: not present  # Code Status: Full Code        Pt's care was discussed with bedside RN, patient and  during Care Team Rounds.               Josué Jimenez MD  Hospitalist ( Internal medicine)  Pager: [phone]

## 2019-09-30 NOTE — PLAN OF CARE
Discharge Planner OT   Patient plan for discharge: Home  Current status: Pt able to complete all ADL mod I while in room, Pt also completed UE calisthenics during session and pt tolerated activity well throughout the session  Barriers to return to prior living situation: No OT barriers  Recommendations for discharge: Home with assist  Rationale for recommendations: Pt will benefit from assistance with heavy ADL/IADL at home       Entered by: Ben Anderson 09/30/2019 2:25 PM     Occupational Therapy Discharge Summary    Reason for therapy discharge:    All goals and outcomes met, no further needs identified.    Progress towards therapy goal(s). See goals on Care Plan in [facility] electronic health record for goal details.  Goals met    Therapy recommendation(s):    Continue home exercise program.

## 2019-09-30 NOTE — PROGRESS NOTES
Met with patient to update the discharge planning discussion. She will discharge to home tomorrow 10/1,  to transport. She will have home care SN/PT/OT with  Home Care. [facility] appointment scheduled 10/7 for transplant team follow-up. PLC transplant class completed today with patient and . Updated Transplant Coordinator and  Home Care Liaison on patient status and discharge plan.

## 2019-09-30 NOTE — PLAN OF CARE
-30 d/t pt's report of inability to focus given her abdominal pain, but she is still very motivated to go home tomorrow     Speech Language Therapy Discharge Summary    Reason for therapy discharge:    Discharged to home.  All goals and outcomes met, no further needs identified.    Progress towards therapy goal(s). See goals on Care Plan in [facility] electronic health record for goal details.  Goals met    Therapy recommendation(s):    No further therapy is recommended.

## 2019-09-30 NOTE — PLAN OF CARE
FOCUS/GOAL  Medical management and Prevention of secondary complications    ASSESSMENT, INTERVENTIONS AND CONTINUING PLAN FOR GOAL:  Pt denied pain during shift. Denied SOB, denied difficulty breathing at rest - some dyspnea with activity - O2 sats 92-94% on room air, not using supplemental O2 during shift. VSS - BP 94/62, asymptomatic. Pt using call light appropriately to express needs. Mod I in room with walker. PLC transplant education class completed today with family at bedside. Pt able to intake nepro and 20% approx of breakfast. Call light within reach. Continue with POC.

## 2019-09-30 NOTE — PLAN OF CARE
FOCUS/GOAL  Bowel management, Bladder management, Pain management, Skin integrity and Cognition/Memory/Judgment/Problem solving    ASSESSMENT, INTERVENTIONS AND CONTINUING PLAN FOR GOAL:  Pt is alert and oriented x4, denies fever, chills, CP, SOB, N/V, abdominal pain, or new weakness/numbness/tingling, continent of bowel and bladder, juan in room, on O2 1 Lpm via NC, sleeping well throughout the night, no tubes, lines or drains, no further care concerns at this time continue with POC.

## 2019-09-30 NOTE — PROGRESS NOTES
Immunosuppression Note:     Myfortic 720 mg BID   mg BID. 9/30 level 282 (13.5 hour trough). Continue current dose and levels every Monday and Thursday. Goal 200-300.       Yvette Reich NP

## 2019-09-30 NOTE — PLAN OF CARE
FOCUS/GOAL  Medical management, Skin integrity, and Safety management    ASSESSMENT, INTERVENTIONS AND CONTINUING PLAN FOR GOAL:  Orientation: alert and oriented x4.  Bowel & Bladder: Continent of bladder and bowel, LBM today 9/28. Pt had one incident of bowel incontinence while trying to get to the bathroom.  Pain: Denies pain  Ambulation/Transfers: Mod I in room with a walker.  Diet/ Liquids: regular/thin, pt has poor appetite but took 2 nepro supplement.  Vitals were reviewed  Temp: 97.5  F (36.4  C) Temp src: Oral BP: 108/63 Pulse: 82   Resp: 18 SpO2: 97 % O2 Device: Nasal cannula Oxygen Delivery: 1 LPM  Oxygen: on 1L , oxygen sats at upper 90's.  Skin: abdominal incision C/D/I.  Pt denies SOB, nausea/vomiting and dizziness. Reported stomach discomfort/fullness, mylanta/maalox suspension PRN given with relief. Call light within reach, Ok to continue with care plan.

## 2019-09-30 NOTE — PLAN OF CARE
"Physical Therapy Discharge Summary    Reason for therapy discharge:    Discharged to home with home therapy.    Progress towards therapy goal(s). See goals on Care Plan in [facility] electronic health record for goal details.  Goals met    Therapy recommendation(s):    Continued therapy is recommended.  Rationale/Recommendations:  continue HH PT to progress strength, endurance, manage BLE lymphedema, and wean off assistive device as able.    Summary - mod I in room with FWW. Navigating 12x6\" steps, B rails, mod I.    DME - owns FWW      "

## 2019-09-30 NOTE — PLAN OF CARE
Speech Language Therapy Discharge Summary    Reason for therapy discharge:    Discharged to home with home therapy.    Progress towards therapy goal(s). See goals on Care Plan in [facility] electronic health record for goal details.  Goals partially met.  Barriers to achieving goals:   discharge from facility.    Therapy recommendation(s):    Continued therapy is recommended.  Rationale/Recommendations:  Address higher level reasoning/problem solving.

## 2019-09-30 NOTE — PROGRESS NOTES
"  [facility]   Acute Rehabilitation Unit  Daily progress note     INTERVAL HISTORY  Nicci Pemberton was seen sitting in julián at bedside with .    No report of desaturation last night and no acute medical events reported.   She denies fevers, denies issues with bowel or bladder, but appetite is still poor, she is trying to take supplement shakes.  Reviewed IM notes and encourages incentive spirometer.  Plan will be discharge home tomorrow.      MEDICATIONS    aspirin  325 mg Oral Daily     cycloSPORINE modified  225 mg Oral BID IS     dapsone  100 mg Oral Daily     famotidine  20 mg Oral Daily     heparin sodium  5,000 Units Subcutaneous Q8H     levothyroxine  175 mcg Oral Daily     lidocaine  2 patch Transdermal Q24h     And     lidocaine   Transdermal Q24H     And     lidocaine   Transdermal Q8H     magnesium oxide  400 mg Oral Daily     melatonin  3 mg Oral At Bedtime     multivitamin w/minerals  1 tablet Oral Daily     mycophenolic acid  720 mg Oral BID     ondansetron  4 mg Oral QAM AC     valGANciclovir  450 mg Oral Daily     vitamin C  500 mg Oral Daily     vitamin D3  1,000 Units Oral BID     zinc sulfate  220 mg Oral Daily        acetaminophen, acetaminophen-caffeine, alum & mag hydroxide-simethicone, alum & mag hydroxide-simethicone, ondansetron, oxymetazoline, - MEDICATION INSTRUCTIONS -, polyethylene glycol, senna-docusate, simethicone, sodium chloride      PHYSICAL EXAM  /60 (BP Location: Left arm)   Pulse 80   Temp 97.7  F (36.2  C) (Oral)   Resp 16   Ht 1.575 m (5' 2\")   Wt 85.3 kg (188 lb 1.6 oz)   SpO2 95%   BMI 34.40 kg/m    Gen: awake, at bed side chair  HEENT: NCAT, EOMI  Cardio: 2+ radial pulse, well perfused  Pulm: non labored, symmetrical chest rise  Abd: soft   Ext: ble in lymphedema wraps  Neuro/MSK: alert speech clear moves all extremities     LABS        Lab Results   Component Value Date     WBC 4.5 09/26/2019            Lab Results "   Component Value Date     RBC 2.61 09/26/2019            Lab Results   Component Value Date     HGB 8.2 09/26/2019            Lab Results   Component Value Date     HCT 27.2 09/26/2019            Lab Results   Component Value Date      09/26/2019            Lab Results   Component Value Date     MCH 31.4 09/26/2019            Lab Results   Component Value Date     MCHC 30.1 09/26/2019            Lab Results   Component Value Date     RDW 20.5 09/26/2019            Lab Results   Component Value Date      09/26/2019         Last Comprehensive Metabolic Panel:        Sodium   Date Value Ref Range Status   09/26/2019 139 133 - 144 mmol/L Final            Potassium   Date Value Ref Range Status   09/26/2019 4.6 3.4 - 5.3 mmol/L Final            Chloride   Date Value Ref Range Status   09/26/2019 102 94 - 109 mmol/L Final            Carbon Dioxide   Date Value Ref Range Status   09/26/2019 29 20 - 32 mmol/L Final            Anion Gap   Date Value Ref Range Status   09/26/2019 8 3 - 14 mmol/L Final            Glucose   Date Value Ref Range Status   09/26/2019 82 70 - 99 mg/dL Final            Urea Nitrogen   Date Value Ref Range Status   09/26/2019 35 (H) 7 - 30 mg/dL Final            Creatinine   Date Value Ref Range Status   09/26/2019 0.86 0.52 - 1.04 mg/dL Final              GFR Estimate   Date Value Ref Range Status   09/26/2019 73 >60 mL/min/[1.73_m2] Final       Comment:       Non  GFR Calc  Starting 12/18/2018, serum creatinine based estimated GFR (eGFR) will be   calculated using the Chronic Kidney Disease Epidemiology Collaboration   (CKD-EPI) equation.               Calcium   Date Value Ref Range Status   09/26/2019 8.7 8.5 - 10.1 mg/dL Final            Rehabilitation - continue comprehensive acute inpatient rehabilitation program with multidisciplinary approach including therapies, rehab nursing, and physiatry following. See interval history for updates.       ASSESSMENT AND  PLAN     Nicci Pemberton is a 59 year old woman with a past medical history of HTN, hypothyroidism, and end stage liver disease secondary to ANGULO and heterozygous alpha-1 antitrypsin deficiency admitted to [facility]  in decompensated liver failure underwent liver transplant , admitted to  ARU for ongoing rehabilitation and medical management 2019.      ESLD 2/2 ANGULO & heterozygous alpha-1 antitrypsin deficiency s/p  donor liver transplant . LFTs down trending  T bili WNL, , ALT 53 AST 44  -appreciate ongoing assistance per transplant and hospitalist service  -no lifting >10 pounds  -transplant coordinator: Zina [phone]  F/u transplant surgeon, transplant hepatology  - CBC, CMP, mag, phos, and CSA levels (12 hours after administration) to be drawn every Monday and Thursday   -Immunosuppression: myfortic, cyclosporine (goal 200-300)  -Prophylaxis: dapsone x 6 months, valacyclovir x 12 weeks  -ongoing ab pain- monitor     Toxic and metabolic encephalopathy- multifactorial: meds, sleep, illness, MRI brain 9/3 neg.  Evaluated by neurology  with workup. .  -continue to monitor  -SLP consult for cog eval  -delirium precautions ordered  -improved     C-Diff- +   -continue po vanco completed on      Acute blood loss anemia- with anemia of chronic disease.  Last transfused .  Hgb stable 8.2    -trend Mon/Thurs     Severe Malnutrition - acute on chronic illness s/p ND  per IR.  Goal to taper off and discontinue TF prior to ARU discharge. Clogged   Removed with trial po intake  marcie count 1328  -appreciate nutrition consult  -marcie counts  -encourage po intake     Medical Device Related Pressure Injury (MDRPI) due to Bridle string  Pressure Injury  Stage 2   -wound care as ordered     Hypothyroidism- continue synthroid        1. Adjustment to disability:  frustrated  2. FEN: reg + TF with poor intake  3. Bowel: continent  4. Bladder: continent  5. DVT  Prophylaxis: ambulation  6. GI Prophylaxis:ppi  7. Code: full  8. Disposition: home ~ 7 days   9. Follow up Appointments on Discharge: hepatology, transplant surgery         Mc Brush DO on 9/30/2019 at 11:42 AM

## 2019-09-30 NOTE — PROGRESS NOTES
Nutrition Services Progress Note    Calorie Count  (9/27)  918 kcals and 40 g protein, 2 meals and at least 2 Nepro supplements.  May have had additional Nepro, but unclear per records.  Per RN note on day shift, eats <25% of meals, but consumes 100% of supplements.    (9/28)  1825 kcals and 79 g protein, 2 meals and 4 Nepro.    (9/29)   1275 kcals and 57 g protein, 3 Nepro.  PO 0% at breakfast and 25% of dinner per nursing records.      Note FT removed 9/26 due to clogged.  Per pt her appetite has not improved since off TF.  Reports continued full feeling in abdomen.  Minimal intake at meals, but per pt drinking 3 Nepro per day.      4 day average Calorie Count (9/26-9/29):  1337 kcals and 60 g protein which meets 74% of lower end calorie needs and 67% lower end protein needs.     Recommendations:  Based on current intakes would ideally be consistently drinking 4 Nepro daily to provide:  1700 kcals and 76 g protein to meet 94% lower end calorie needs and 84% lower end protein needs.    Discussed with pt.    Consider discontinue Vitamin C and Zinc Sulfate due to per chart has received x 10 days as recommended per wound protocol.     Tanisha Melchor RD, LD

## 2019-10-01 VITALS
OXYGEN SATURATION: 94 % | HEIGHT: 62 IN | SYSTOLIC BLOOD PRESSURE: 109 MMHG | WEIGHT: 185.4 LBS | RESPIRATION RATE: 18 BRPM | BODY MASS INDEX: 34.12 KG/M2 | HEART RATE: 79 BPM | TEMPERATURE: 98.2 F | DIASTOLIC BLOOD PRESSURE: 65 MMHG

## 2019-10-01 PROCEDURE — 99232 SBSQ HOSP IP/OBS MODERATE 35: CPT | Performed by: INTERNAL MEDICINE

## 2019-10-01 PROCEDURE — 25000131 ZZH RX MED GY IP 250 OP 636 PS 637: Performed by: INTERNAL MEDICINE

## 2019-10-01 PROCEDURE — 25000131 ZZH RX MED GY IP 250 OP 636 PS 637: Performed by: PHYSICAL MEDICINE & REHABILITATION

## 2019-10-01 PROCEDURE — 25000132 ZZH RX MED GY IP 250 OP 250 PS 637: Performed by: PHYSICIAN ASSISTANT

## 2019-10-01 PROCEDURE — 25000131 ZZH RX MED GY IP 250 OP 636 PS 637: Performed by: HOSPITALIST

## 2019-10-01 PROCEDURE — 25000132 ZZH RX MED GY IP 250 OP 250 PS 637: Performed by: HOSPITALIST

## 2019-10-01 RX ORDER — CYCLOSPORINE 25 MG/1
25 CAPSULE, LIQUID FILLED ORAL 2 TIMES DAILY
Qty: 60 CAPSULE | Refills: 0 | Status: ON HOLD | OUTPATIENT
Start: 2019-10-01 | End: 2019-10-04

## 2019-10-01 RX ORDER — CYCLOSPORINE 100 MG/1
CAPSULE ORAL
Qty: 120 CAPSULE | Refills: 0 | Status: ON HOLD | OUTPATIENT
Start: 2019-10-01 | End: 2019-10-04

## 2019-10-01 RX ADMIN — OXYCODONE HYDROCHLORIDE AND ACETAMINOPHEN 500 MG: 500 TABLET ORAL at 09:10

## 2019-10-01 RX ADMIN — LEVOTHYROXINE SODIUM 175 MCG: 50 TABLET ORAL at 09:10

## 2019-10-01 RX ADMIN — ONDANSETRON 4 MG: 4 TABLET, ORALLY DISINTEGRATING ORAL at 06:40

## 2019-10-01 RX ADMIN — MULTIPLE VITAMINS W/ MINERALS TAB 1 TABLET: TAB at 09:11

## 2019-10-01 RX ADMIN — MAGNESIUM OXIDE TAB 400 MG (241.3 MG ELEMENTAL MG) 400 MG: 400 (241.3 MG) TAB at 09:11

## 2019-10-01 RX ADMIN — ASPIRIN 325 MG ORAL TABLET 325 MG: 325 PILL ORAL at 09:11

## 2019-10-01 RX ADMIN — VALGANCICLOVIR 450 MG: 450 TABLET, FILM COATED ORAL at 09:11

## 2019-10-01 RX ADMIN — MELATONIN 1000 UNITS: at 09:11

## 2019-10-01 RX ADMIN — DAPSONE 100 MG: 100 TABLET ORAL at 09:11

## 2019-10-01 RX ADMIN — CYCLOSPORINE 225 MG: 100 CAPSULE ORAL at 09:11

## 2019-10-01 RX ADMIN — FAMOTIDINE 20 MG: 20 TABLET, FILM COATED ORAL at 09:11

## 2019-10-01 RX ADMIN — ZINC SULFATE CAP 220 MG (50 MG ELEMENTAL ZN) 220 MG: 220 (50 ZN) CAP at 09:11

## 2019-10-01 RX ADMIN — MYCOPHENOLIC ACID 720 MG: 360 TABLET, DELAYED RELEASE ORAL at 09:10

## 2019-10-01 ASSESSMENT — MIFFLIN-ST. JEOR: SCORE: 1369.22

## 2019-10-01 NOTE — DISCHARGE SUMMARY
"    [facility]   Acute Rehabilitation Unit  Discharge summary     Date of Admission: 9/23/2019  Date of Discharge: 10/1/2019  Disposition: home  Primary Care Physician: Wale Thurston  Attending physician: Mc Brsuh DO  Other significant physician provider(s): Mary Alcantara PA-C      discharge diagnosis  S/p liver transplant  Debility  Toxic and metabolic encephalopath (improved)  Recent C-diff  Acute Blood loss Anemia  Severe malnutrition  atelectasis      brief summary  Nicci Pemberton is a 59 year old woman with a past medical history of HTN, hypothyroidism, and end stage liver disease secondary to ANGULO and heterozygous alpha-1 antitrypsin deficiency admitted to [facility] 8/16 in decompensated liver failure underwent liver transplant 8/18, admitted to  ARU for ongoing rehabilitation and medical management 9/23/2019.     rehabilitation course  Physical Therapy Discharge Summary     Reason for therapy discharge:    Discharged to home with home therapy.     Therapy recommendation(s):    Continued therapy is recommended.  Rationale/Recommendations:  continue HH PT to progress strength, endurance, manage BLE lymphedema, and wean off assistive device as able.     Summary - mod I in room with FWW. Navigating 12x6\" steps, B rails, mod I.     DME - owns FWW    Speech Language Therapy Discharge Summary     Reason for therapy discharge:    Discharged to home with home therapy.     Therapy recommendation(s):    Continued therapy is recommended.  Rationale/Recommendations:  Address higher level reasoning/problem solving.    OT   Patient plan for discharge: Home  Current status: Pt able to complete all ADL mod I while in room, Pt also completed UE calisthenics during session and pt tolerated activity well throughout the session  Barriers to return to prior living situation: No OT barriers  Recommendations for discharge: Home with assist  Rationale for recommendations: Pt will " benefit from assistance with heavy ADL/IADL at home     mEDICAL COURSE  ESLD 2/2 ANGULO & heterozygous alpha-1 antitrypsin deficiency s/p  donor liver transplant . LFTs stable/down.  -transplant coordinator: Zina [phone]  F/u transplant surgeon, transplant hepatology  - CBC, CMP, mag, phos, and CSA levels (12 hours after administration) to be drawn every Monday and Thursday   -Immunosuppression: myfortic, cyclosporine (goal 200-300)  -Prophylaxis: dapsone x 6 months, valacyclovir x 12 weeks  -ongoing ab pain- monitor     Toxic and metabolic encephalopathy- multifactorial: meds, sleep, illness, MRI brain 9/3 neg.  Evaluated by neurology  with workup. (improved)      C-Diff- +   - po vanco course completed on      Acute blood loss anemia- with anemia of chronic disease.  Last transfused .  Hgb stable 8.1     Atelectasis- with intermittent sob and brief oxygen desaturations with recovery with deep breaths in less than 1 minute, bibasilar crackles, Did not meet criteria for home oxygen on 6 minute walk test 10/1.  Chest XR stable   -encourage IS  -home care RN to continue to monitor  - consider referral follow up sleep study eval for mira      Severe Malnutrition - acute on chronic illness s/p ND  per IR.  Goal to taper off and discontinue TF prior to ARU discharge. Clogged   Removed with trial po intake .  -consuming at least 3 supplement drinks daily to meet calorie needs prior to discharge     Medical Device Related Pressure Injury (MDRPI) due to Bridle string  Pressure Injury  Stage 2,  TF removed   dISCHARGE MEDICATIONS  Current Discharge Medication List      START taking these medications    Details   acetaminophen-caffeine (EXCEDRIN TENSION HEADACHE) 500-65 MG TABS Take 1 tablet by mouth daily as needed (tension headache)    Associated Diagnoses: Tension headache      cycloSPORINE modified (GENERIC EQUIVALENT) 25 MG capsule Take 1 capsule (25 mg) by mouth 2  times daily Take 225 mg (2 100 mg tabs and 1 25 mg tab) by mouth twice daily- dose modification per transplant team  Qty: 60 capsule, Refills: 0    Associated Diagnoses: S/P liver transplant (H)      GENGRAF (BRAND) 100 MG CAPSULE Take 225 mg cyclosporine daily- take 2 (100 mg tabs) and 1 (25 mg tab) by mouth daily.  Qty: 120 capsule, Refills: 0    Associated Diagnoses: S/P liver transplant (H)      magnesium oxide (MAG-OX) 400 MG tablet Take 1 tablet (400 mg) by mouth daily  Qty: 30 tablet, Refills: 0    Associated Diagnoses: S/P liver transplant (H); Severe malnutrition (H)      simethicone (MYLICON) 80 MG chewable tablet Take 1 tablet (80 mg) by mouth every 6 hours as needed for cramping    Associated Diagnoses: S/P liver transplant (H)         CONTINUE these medications which have CHANGED    Details   ascorbic acid 500 MG TABS Take 1 tablet (500 mg) by mouth daily  Qty: 30 tablet, Refills: 0    Associated Diagnoses: Status post liver transplantation (H)      aspirin (ASA) 325 MG tablet Take 1 tablet (325 mg) by mouth daily  Qty: 30 tablet, Refills: 0    Associated Diagnoses: Status post liver transplantation (H)      Cholecalciferol (VITAMIN D-3) 1000 units CAPS Take 1,000 Units by mouth 2 times daily  Qty: 60 capsule, Refills: 0    Associated Diagnoses: Severe malnutrition (H)      dapsone (ACZONE) 100 MG tablet Take 1 tablet (100 mg) by mouth daily  Qty: 30 tablet, Refills: 0    Associated Diagnoses: Status post liver transplantation (H)      famotidine (PEPCID) 20 MG tablet Take 1 tablet (20 mg) by mouth daily  Qty: 30 tablet, Refills: 0    Associated Diagnoses: Status post liver transplantation (H)      levothyroxine (SYNTHROID/LEVOTHROID) 175 MCG tablet Take 1 tablet (175 mcg) by mouth daily  Qty: 30 tablet, Refills: 0    Associated Diagnoses: Status post liver transplantation (H)      MYFORTIC (BRAND) 360 MG EC tablet Take 2 tablets (720 mg) by mouth 2 times daily  Qty: 120 tablet, Refills: 0    Associated  Diagnoses: S/P liver transplant (H)      valGANciclovir (VALCYTE) 450 MG tablet Take 1 tablet (450 mg) by mouth daily  Qty: 30 tablet, Refills: 0    Associated Diagnoses: Status post liver transplantation (H)      zinc sulfate (ZINCATE) 220 (50 Zn) MG capsule Take 1 capsule (220 mg) by mouth daily  Qty: 30 capsule, Refills: 0    Associated Diagnoses: Status post liver transplantation (H)         STOP taking these medications       GENGRAF (BRAND) 100 MG/ML SOLUTION Comments:   Reason for Stopping:         heparin sodium 5000 UNIT/0.5ML injection Comments:   Reason for Stopping:         melatonin 3 MG tablet Comments:   Reason for Stopping:         menthol, Topical Analgesic, 2.5% (BENGAY VANISHIN SCENT) 2.5 % GEL topical gel Comments:   Reason for Stopping:         multivitamins w/minerals (CERTAVITE) liquid Comments:   Reason for Stopping:         ondansetron (ZOFRAN-ODT) 4 MG ODT tab Comments:   Reason for Stopping:         ondansetron (ZOFRAN-ODT) 4 MG ODT tab Comments:   Reason for Stopping:         polyethylene glycol (MIRALAX/GLYCOLAX) packet Comments:   Reason for Stopping:         protein modular (PROSOURCE TF) LIQD Comments:   Reason for Stopping:         senna-docusate (SENOKOT-S/PERICOLACE) 8.6-50 MG tablet Comments:   Reason for Stopping:         sodium chloride (OCEAN) 0.65 % nasal spray Comments:   Reason for Stopping:         vancomycin (FIRVANQ) 50 MG/ML oral solution Comments:   Reason for Stopping:                 DISCHARGE INSTRUCTIONS AND FOLLOW UP  Discharge Procedure Orders   Home care nursing referral   Referral Priority: Routine Referral Type: Home Health Therapies & Aides   Number of Visits Requested: 1     Home Care PT Referral for Hospital Discharge   Referral Priority: Routine Referral Type: Home Health Therapies & Aides   Number of Visits Requested: 1     Home Care OT Referral for Hospital Discharge   Referral Priority: Routine   Number of Visits Requested: 1     Reason for your hospital  stay   Order Comments: Admitted for rehabilitation following liver transplant     Adult [facility] Follow-up and recommended labs and tests   Order Comments: Continue labs Mon/Thurs per transplant team (CBC, BMP, Hepatic panel, cyclosporine level)   Follow up with transplant hepatology and transplant surgery as scheduled    Appointments on [place] and/or [facility] (with [facility] or [facility] provider or service). Call [phone] if you haven't heard regarding these appointments within 7 days of discharge.     MD face to face encounter   Order Comments: Documentation of Face to Face and Certification for Home Health Services    I certify that patient: Nicci Pemberton is under my care and that I, or a nurse practitioner or physician's assistant working with me, had a face-to-face encounter that meets the physician face-to-face encounter requirements with this patient on: 9/30/2019.    This encounter with the patient was in whole, or in part, for the following medical condition, which is the primary reason for home health care: impaired physical mobility / immunosuppressed patient..    I certify that, based on my findings, the following services are medically necessary home health services: Nursing, Occupational Therapy and Physical Therapy.    My clinical findings support the need for the above services because: Nurse is needed: assess home safety, draw labs., Occupational Therapy Services are needed to assess and treat cognitive ability and address ADL safety due to impairment in functional mobility. and Physical Therapy Services are needed to assess and treat the following functional impairments: mobility/activity tolerance.    Further, I certify that my clinical findings support that this patient is homebound (i.e. absences from home require considerable and taxing effort and are for medical reasons or [employer] services or infrequently or of short duration when for other reasons) because: Leaving home is medically  contraindicated for the following reason(s): Infection risk / immunocompromised state where it is safer for them to receive services in the home...    Based on the above findings. I certify that this patient is confined to the home and needs intermittent skilled nursing care, physical therapy and/or speech therapy.  The patient is under my care, and I have initiated the establishment of the plan of care.  This patient will be followed by a physician who will periodically review the plan of care.  Physician/Provider to provide follow up care: Wale Thurston    Attending hospital physician (the Medicare certified [place] provider): Mc Brush DO  Physician Signature: See electronic signature associated with these discharge orders.  Date: 9/30/2019     Activity   Order Comments: As tolerated. Ask for assist as needed.     Order Specific Question Answer Comments   Is discharge order? Yes      Full Code     Order Specific Question Answer Comments   Code status determined by: Discussion with patient/legal decision maker      Oxygen Adult   Order Comments: New Home Oxygen Order 1 liter(s) by nasal cannula with activity with use of portable tank and while sleeping. Expected treatment length is 3 months.. Test on conserving device as applicable.    Patients who qualify for home O2 coverage under the CMS guidelines require ABG tests or O2 sat readings obtained closest to, but no earlier than 2 days prior to the discharge, as evidence of the need for home oxygen therapy. Testing must be performed while patient is in the chronic stable state. See notes for O2 sats.    I certify that this patient, Nicci Pemberton has been under my care and that I, or a nurse practitioner or physician's assistant working with me, had a face-to-face encounter that meets the face-to-face encounter requirements with this patient on 10/1/2019. The patient, Nicci Pemberton was evaluated or treated in whole, or in part, for the following  medical condition, which necessitates the use of the ordered oxygen. Treatment Diagnosis: atelectasis, debility, eval for sleep apnea    Attending Provider: Mc Brush DO  Physician signature: See electronic signature associated with these discharge orders  Date of Order: October 1, 2019     Regular Diet Adult     Order Specific Question Answer Comments   Is discharge order? Yes           physical examination    Most recent Vital Signs:   Vitals:    09/30/19 1612 10/01/19 0653 10/01/19 0804 10/01/19 0909   BP: 101/65  109/65    BP Location: Right arm  Left arm    Pulse: 83  79    Resp: 16  18    Temp: 97.3  F (36.3  C)  98.2  F (36.8  C)    TempSrc: Oral  Oral    SpO2: 93%  90% 94%   Weight:  84.1 kg (185 lb 6.4 oz)     Height:       General: awake alert nad  REsp: non labored on room air  Ab: soft non distended surgical incision cdi  Extremities: warm dry with mild ble edema  Msk/neuro: alert awake moves all extremities against gravity      45 minutes spent in discharge, including >50% in counseling and coordination of care, medication review and plan of care recommended on follow up.     Patient was evaluated on day of discharge by attending physician, Mc Brush DO, who agrees with plan of care.    Discharge summary was forwarded to Wale Thurston (PCP) at the time of discharge, so as to bridge from hospital to outpatient care.     It was our pleasure to care for Nicci Pemberton during this hospitalization. Please do not hesitate to contact me should there be questions regarding the hospital course or discharge plan.          Mary Alcantara PA-C  Physical Medicine and Rehabilitation   [phone]

## 2019-10-01 NOTE — PROGRESS NOTES
"[facility], [place]   Internal Medicine Daily Note           Interval History/Events     Overnight event reviewed  Reports doing well  No fever or chills  No cough or cp or sob at current. On RA breathing fine.   No bowel or bladder concern.   No Lh or dizziness.        Review of Systems        4 point ROS including Respiratory, CV, GI and , other than that noted above is negative      Medications   I have reviewed current medications  in the \"current medication\" section of Epic.  Relevant changes include:     Physical Exam         Vital signs:    Blood pressure 109/65, pulse 79, temperature 98.2  F (36.8  C), temperature source Oral, resp. rate 18, height 1.575 m (5' 2\"), weight 84.1 kg (185 lb 6.4 oz), SpO2 94 %, not currently breastfeeding.  Estimated body mass index is 33.91 kg/m  as calculated from the following:    Height as of this encounter: 1.575 m (5' 2\").    Weight as of this encounter: 84.1 kg (185 lb 6.4 oz).        Gen: NAD  HEENT: No icterus, no pallor  Cardiovascular: S1, S2 normal  Respiratory:  B/L CTA, diminished at bases R>L  GI/Abdomen: Soft, Non tender.   Neurology: Alert, awake, and oriented.   Extremities: B/l  pretibial edema.   Skin: no new visible rash     Laboratory and Imaging Studies     I have reviewed  laboratory and imaging studies in the Epic. Pertinent findings are as below:    BMP  Recent Labs   Lab 09/30/19  0714 09/29/19  0533 09/26/19  0644    140 139   POTASSIUM 4.6 4.6 4.6   CHLORIDE 102 102 102   JACOB 9.2 8.9 8.7   CO2 34* 33* 29   BUN 33* 32* 35*   CR 1.06* 1.02 0.86   GLC 79 93 82     CBC  Recent Labs   Lab 09/30/19  0714 09/29/19  0533 09/26/19  0644   WBC 3.2* 4.3 4.5   RBC 2.51* 2.67* 2.61*   HGB 8.1* 8.5* 8.2*   HCT 26.8* 28.4* 27.2*   * 106* 104*   MCH 32.3 31.8 31.4   MCHC 30.2* 29.9* 30.1*   RDW 21.0* 21.0* 20.5*    197 200     INRNo lab results found in last 7 days.  LFTs  Recent Labs   Lab 09/30/19  0714 " 19  0533 19  0644   ALKPHOS 179* 196* 222*   AST 26 29 44   ALT 34 40 53*   BILITOTAL 1.4* 1.5* 1.3   PROTTOTAL 5.9* 6.2* 5.7*   ALBUMIN 2.7* 2.8* 2.4*      PANCNo lab results found in last 7 days.        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  Underwent  donor liver transplant 2019.  Post operative course complicated by toxic and metabolic encephalopathy, C-Diff diarrhea, hyperkalemia, steroid induced hyperglycemia,  impaired oral intake, nausea, epistaxis,  acute blood loss anemia, and  Weakness. Extensive work up done for encephalopathy. It has been slowly resolving  She is on oral vancomycin for cdiff. She is transferred to rehab.       # ANGULO, Heterozygote for Alpha 1 Antitrypsin Deficiency, S/P Liver Transplant on 19:   Graft functioning well.  Mild increase in LFT's  On the discharge day and hence Mycophenolate was increased from 500 BID to 720  Immunosuppression: mycophenolate (Myfortic 720mg BID) Cyclosporine 225 mg BID.  Tacrolimus was discontinued on  due to prolonged delirium.   Transplant team note reviewed from 2019  ID Prophylaxis: dapsone (x6 months), valganciclovir (x12 weeks), Mycelex completed inpatient.    Biliary stent:  No stent on AXR 19  Bilirubin 1.4, ALP improving up to 179, AST, and ALT normal.   - Continue  CSA, MMF  - Continue ID regimen of Dapsone, Valganciclovir  - Follow up with Transplant team.     # Dizziness: Reports intermittent issues with dizziness. Defined as spinning sensation. Orthostatic vitals are stable. Not present during evaluation. No hearing diffiuculty. No tinnitus. No focal neuro deficit. Doubt cardiovascular etiology  No issues in last 48 hours  - Meclizine PRN  - Continue to monitor for now     # C Diff Colitis:   Started on oral Vanc on 19.  Completed 2 weeks treatment on 09/26.       # Toxic and Metabolic Encephalopathy: ( Resolved)    Has resolved. Tacro stopped and several days later encephalopathy improved.  Was seen by neurology, had work up done in patient at [facility].     # Acute blood loss anemia on Anemia of chronic disease. Hb 7.9.  Ferritin high, and TSAT 17. Suggestive of Iron deficiency state.  Folate, Vit B 12 normal.   Hg 8.1 gm/dl.   - Continue Ferrous sulfate  - CBC  M/Thu      #  Hypothyroidism: On Levothyroxine.   - Continue      # Severe Malnutrition: On TF. Management  per primary team.      #  Hypoxemia: Intermittent in nature. XR chest on 09/29 showed moderate Right pleural effusion and atelectasis.   Patient asymptomatic @ current.   - Advised patient to use incentive spirometry- Educated pt extensively.   - Walk test prior to discharge: did not meet criteria for home oxygen.   - Outpatient sleep lab  - ED prn for worsening sob or hypoxia.      Rest per primary team.       Pt's care was discussed with bedside RN, patient, family. PM & R team      Sterling Meier MD   Hospitalist (Internal Medicine)  Pager: [phone].

## 2019-10-01 NOTE — PLAN OF CARE
FOCUS/GOAL  Bowel management, Bladder management, Pain management and Skin integrity    ASSESSMENT, INTERVENTIONS AND CONTINUING PLAN FOR GOAL:  Pt is alert and oriented, mod I in room, did not call overnight for assistance, no reports of n/v, dizziness, lightheadedness, pain, sob, or new changes in sensation, Pt found to have removed O2 at 2 am, sleeping well overnight, no further care concerns at this time continue with POC.

## 2019-10-01 NOTE — PLAN OF CARE
Pt alert and oriented x4. VSS. On 0.5 L supplemental O2 to maintain oxygen sats. Chest xray on 9/29 showed some atelectasis. Incentive spirometer encouraged. MOD I in room with walker. Regular diet, thin liquids. Did not eat any of her supper tonight. Drank one nepro. Complained that her stomach just felt full. Continent of bowel and bladder. Urine very genaro in color. LBM: 9/30. Voiding spontaneously to the bathroom. Plan is for pt to discharge tomorrow. Uses call light appropriately. Will continue with plan of care. Pt voiced concern that if she is on oxygen tonight, that she will not be able to go home tomorrow. I assured her that the physician will talk to her tomorrow.

## 2019-10-01 NOTE — DISCHARGE INSTRUCTIONS
Your Transplant Coordinator RN is Zina Dunham [phone]    You will be seen in [facility] (ph. [phone], option 7) the next Monday after discharge from [facility]. Your labs will be drawn at 9:00 am just prior to your appointment. DO NOT take Cyclosporine until after your labs are drawn. Remember to bring all of your medications with you to this appointment.       LABS:  Transplant labs (CBC, BMP, hepatic panel, mag, phos, and CSA levels (12 hours after administration)) to be drawn every Monday and Thursday for 4 weeks.     Follow up appointments:  Remember to always bring an updated medication list to all appointments.       -- Follow up with your transplant surgeon, Dr. Alford as scheduled on Monday, October 7th at 9:00 am.    -- Follow up with transplant hepatology in 1 month.  Call scheduling at [phone] if you have not heard about this appointment within 48 hours of discharge.    -- Follow up with primary care provider (Dr. Thurston) in 2-4 weeks. (Patient to schedule)    [place] [address]  Phone   [phone]       WHEN TO CONTACT YOUR  COORDINATOR:  Transplant Coordinator [phone]  Notify your coordinator if you have abdominal pain, increased redness or drainage from your incision, or fever greater than 100.5F.  Notify your coordinator immediately if you are ever unable to take your immunosuppressive medications for any reason.  If it is outside of office hours, please call the hospital switchboard at [phone] and ask to have the liver transplant surgery fellow paged for urgent medical questions, or present to the emergency department.     OTHER DISCHARGE INSTRUCTIONS:  Walk at least four times a day, lift no greater than 10 pounds for 6-8 weeks from the time of surgery.  No driving while taking narcotics or 3 weeks after surgery.    Transplant lab letter-            Solid Organ Transplant        [address]  [address]  [phone] or [phone]         OUTPATIENT OR TRANSITIONAL CARE  LABORATORY TEST ORDER     Patient Name:    Nicci Pemberton                                    Transplant Date:   2019   YOB: 1960                                             Issue Date:            19   [facility] MR#:    [id number]                                           Expiration Date:   2020         Diagnoses:            [x]?      Liver Transplant (ICD-10 Z94.4)                                 [x]?      Long term use of medications (ICD-10 Z79.899)      Please fax results to [phone]     Frequency: twice weekly and PRN        [x]?         CBC with Platelets   [x]?         Basic Metabolic Panel (Sodium, Potassium, Chloride, CO2, Creatinine, Urea Nitrogen, Glucose, Calcium)  [x]?         Phosphorous, Magnesium  [x]?         Hepatic panel (Albumin, Alk Phos, ALT, AST, Direct and Total Bili)  [x]?         Tacrolimus drug level - 12 hour trough, please document time of last dose            If you have questions, please call [phone] or [phone].       Mandeep Alford MD, PAM  Professor of Surgery  [facility] [employer] [facility] [employer]  Surgical Director of Liver Transplant Program  Executive Medical Director of Pediatric Transplantation  The [facility] represents a collaboration between [facility] and [facility].        RE: Nicci Pemberton    MRN: [id number]   : 1960   ENC DATE: Sep 27, 2019   PAGE:          Communication Managment Recipients        Letter on: 2019 by: YOHANA HALE   Recipients: Nicci Pemberton

## 2019-10-01 NOTE — PLAN OF CARE
FOCUS/GOAL  Nutrition/Feeding/Swallowing precautions, Medication management, Medical management, Discharge planning, Carryover of rehab techniques, Safety management, and Prevention of secondary complications    ASSESSMENT, INTERVENTIONS AND CONTINUING PLAN FOR GOAL:  Pt denied pain during shift. Denied SOB, denied difficulty during shift. No numbness or tingling. No nausea noted, just abdominal discomfort d/t surgery but pt stated no interventions needed and manageable - no worsening since previous reports. VSS. O2 sats 90-95% on room air, asymptomatic. Pt stated not symptomatic with mobility but feeling of fatigue. Education provided on nutritional intake by RN and MD, nepro continued and proper intake of nepro. Education provided on IS use, deep breathing, and techniques for home. O2 needs assessed, see note, pt does not meet parameters and with activity 6 min walk, O2 sats 89-95%, no need for supplemental O2, asymptomatic, fatigue and rest break education provided for home. Recommending sleep study per team and pt report of worsening symptoms - continuing follow-up with discharge appointments. Home OT/PT/Nursing scheduled. Reviewed follow-up appointments with pt and scheduled lab draw. Reviewed discharge medications thoroughly with pt and pt's spouse. Discharge medications reviewed and physically sent with pt and pt's spouse. Pt adequate for discharge. Pt discharged with pt's spouse to home from [facility] 10/1 approx 1415.

## 2019-10-01 NOTE — PROGRESS NOTES
Patient has been assessed for Home Oxygen needs.  Oxygen readings:   *   RA - at rest  Pulse oximetry SPO2 95 %  *   RA - during activity/with exercise SPO2 89-95 %  Remained at SPO2 89% <10 seconds.  - No oxygen needed during activity, 6 min walk. See O2 sats above.    *   O2 at  0 liters/minute (at rest)   *   O2 at  0 liters/minute (during activity/with exercise)

## 2019-10-01 NOTE — PROGRESS NOTES
Notified that pt will most likely need O2 at time of discharge. RN completed walking O2 Sat and pt did not drop below 89%. See RN note. [facility] notified of update and order for O2 cancelled. PA notified. Pt and  notified by RN. No further SW needs. See RN CC note for discharge plans.     WANDA Turner, [facility]   Phone: [phone]  I   Pager: [phone]

## 2019-10-02 ENCOUNTER — TELEPHONE (OUTPATIENT)
Dept: TRANSPLANT | Facility: CLINIC | Age: 59
End: 2019-10-02

## 2019-10-02 NOTE — PHARMACY
Patient chooses to receive medications from  specialty pharmacy.  Updated pharmacy patient call list. Notified pharmacy.        Shankar Gore, R.Ph.  [facility]  [phone] pager

## 2019-10-03 ENCOUNTER — TELEPHONE (OUTPATIENT)
Dept: TRANSPLANT | Facility: CLINIC | Age: 59
End: 2019-10-03

## 2019-10-03 DIAGNOSIS — Z94.4 LIVER REPLACED BY TRANSPLANT (H): ICD-10-CM

## 2019-10-03 LAB
ALBUMIN SERPL-MCNC: 3.6 G/DL (ref 3.4–5)
ALP SERPL-CCNC: 188 U/L (ref 40–150)
ALT SERPL W P-5'-P-CCNC: 33 U/L (ref 0–50)
ANION GAP SERPL CALCULATED.3IONS-SCNC: 4 MMOL/L (ref 3–14)
AST SERPL W P-5'-P-CCNC: 24 U/L (ref 0–45)
BILIRUB DIRECT SERPL-MCNC: 1 MG/DL (ref 0–0.2)
BILIRUB SERPL-MCNC: 1.9 MG/DL (ref 0.2–1.3)
BUN SERPL-MCNC: 34 MG/DL (ref 7–30)
CALCIUM SERPL-MCNC: 10 MG/DL (ref 8.5–10.1)
CHLORIDE SERPL-SCNC: 98 MMOL/L (ref 94–109)
CO2 SERPL-SCNC: 34 MMOL/L (ref 20–32)
CREAT SERPL-MCNC: 0.96 MG/DL (ref 0.52–1.04)
ERYTHROCYTE [DISTWIDTH] IN BLOOD BY AUTOMATED COUNT: 19.9 % (ref 10–15)
FUNGUS SPEC CULT: NORMAL
GFR SERPL CREATININE-BSD FRML MDRD: 65 ML/MIN/{1.73_M2}
GLUCOSE SERPL-MCNC: 83 MG/DL (ref 70–99)
HCT VFR BLD AUTO: 32.6 % (ref 35–47)
HGB BLD-MCNC: 10.2 G/DL (ref 11.7–15.7)
Lab: NORMAL
MAGNESIUM SERPL-MCNC: 1.6 MG/DL (ref 1.6–2.3)
MCH RBC QN AUTO: 34.3 PG (ref 26.5–33)
MCHC RBC AUTO-ENTMCNC: 31.3 G/DL (ref 31.5–36.5)
MCV RBC AUTO: 110 FL (ref 78–100)
PHOSPHATE SERPL-MCNC: 4.4 MG/DL (ref 2.5–4.5)
PLATELET # BLD AUTO: 173 10E9/L (ref 150–450)
POTASSIUM SERPL-SCNC: 4.4 MMOL/L (ref 3.4–5.3)
PROT SERPL-MCNC: 7.3 G/DL (ref 6.8–8.8)
RBC # BLD AUTO: 2.97 10E12/L (ref 3.8–5.2)
SODIUM SERPL-SCNC: 136 MMOL/L (ref 133–144)
SPECIMEN SOURCE: NORMAL
WBC # BLD AUTO: 3.7 10E9/L (ref 4–11)

## 2019-10-03 PROCEDURE — 83735 ASSAY OF MAGNESIUM: CPT | Performed by: TRANSPLANT SURGERY

## 2019-10-03 PROCEDURE — 80197 ASSAY OF TACROLIMUS: CPT | Performed by: TRANSPLANT SURGERY

## 2019-10-03 PROCEDURE — 36415 COLL VENOUS BLD VENIPUNCTURE: CPT | Performed by: TRANSPLANT SURGERY

## 2019-10-03 PROCEDURE — 80048 BASIC METABOLIC PNL TOTAL CA: CPT | Performed by: TRANSPLANT SURGERY

## 2019-10-03 PROCEDURE — 84100 ASSAY OF PHOSPHORUS: CPT | Performed by: TRANSPLANT SURGERY

## 2019-10-03 PROCEDURE — 85027 COMPLETE CBC AUTOMATED: CPT | Performed by: TRANSPLANT SURGERY

## 2019-10-03 PROCEDURE — 80076 HEPATIC FUNCTION PANEL: CPT | Performed by: TRANSPLANT SURGERY

## 2019-10-03 NOTE — TELEPHONE ENCOUNTER
Provider Call: Home Care  Route to LPN  Facility Name: [facility]     Reason for Call: HC RN was unable to draw labs today 10/3/19 at home. HC RN Is sending the pt in to a [facility] clinic to get labs done.    Please connect to confirm the message was received    Callback needed? Yes    Return Call Needed  Same as documented in contacts section  When to return call?: Same day: Route High Priority

## 2019-10-04 ENCOUNTER — TELEPHONE (OUTPATIENT)
Dept: TRANSPLANT | Facility: CLINIC | Age: 59
End: 2019-10-04

## 2019-10-04 ENCOUNTER — HOSPITAL ENCOUNTER (OUTPATIENT)
Facility: CLINIC | Age: 59
Setting detail: OBSERVATION
Discharge: HOME | End: 2019-10-06
Attending: EMERGENCY MEDICINE | Admitting: TRANSPLANT SURGERY
Payer: COMMERCIAL

## 2019-10-04 ENCOUNTER — APPOINTMENT (OUTPATIENT)
Dept: CT IMAGING | Facility: CLINIC | Age: 59
End: 2019-10-04
Attending: EMERGENCY MEDICINE
Payer: COMMERCIAL

## 2019-10-04 ENCOUNTER — APPOINTMENT (OUTPATIENT)
Dept: GENERAL RADIOLOGY | Facility: CLINIC | Age: 59
End: 2019-10-04
Attending: PHYSICIAN ASSISTANT
Payer: COMMERCIAL

## 2019-10-04 ENCOUNTER — APPOINTMENT (OUTPATIENT)
Dept: ULTRASOUND IMAGING | Facility: CLINIC | Age: 59
End: 2019-10-04
Attending: PHYSICIAN ASSISTANT
Payer: COMMERCIAL

## 2019-10-04 DIAGNOSIS — R42 LIGHTHEADEDNESS: ICD-10-CM

## 2019-10-04 DIAGNOSIS — A04.72 C. DIFFICILE DIARRHEA: ICD-10-CM

## 2019-10-04 DIAGNOSIS — R11.2 NAUSEA AND VOMITING, INTRACTABILITY OF VOMITING NOT SPECIFIED, UNSPECIFIED VOMITING TYPE: ICD-10-CM

## 2019-10-04 DIAGNOSIS — Z79.899 LONG TERM CURRENT USE OF IMMUNOSUPPRESSIVE DRUG: ICD-10-CM

## 2019-10-04 DIAGNOSIS — R09.02 HYPOXIA: ICD-10-CM

## 2019-10-04 DIAGNOSIS — R42 VERTIGO: ICD-10-CM

## 2019-10-04 DIAGNOSIS — R42 DIZZINESS: ICD-10-CM

## 2019-10-04 DIAGNOSIS — H66.90 ACUTE OTITIS MEDIA, UNSPECIFIED OTITIS MEDIA TYPE: Primary | ICD-10-CM

## 2019-10-04 DIAGNOSIS — Z94.4 S/P LIVER TRANSPLANT (H): ICD-10-CM

## 2019-10-04 DIAGNOSIS — Z94.4 LIVER REPLACED BY TRANSPLANT (H): Primary | ICD-10-CM

## 2019-10-04 LAB
ALBUMIN SERPL-MCNC: 3.2 G/DL (ref 3.4–5)
ALP SERPL-CCNC: 176 U/L (ref 40–150)
ALT SERPL W P-5'-P-CCNC: 28 U/L (ref 0–50)
ANION GAP SERPL CALCULATED.3IONS-SCNC: 5 MMOL/L (ref 3–14)
AST SERPL W P-5'-P-CCNC: 23 U/L (ref 0–45)
BASOPHILS # BLD AUTO: 0 10E9/L (ref 0–0.2)
BASOPHILS NFR BLD AUTO: 0.5 %
BILIRUB SERPL-MCNC: 1.8 MG/DL (ref 0.2–1.3)
BUN SERPL-MCNC: 34 MG/DL (ref 7–30)
CALCIUM SERPL-MCNC: 9.6 MG/DL (ref 8.5–10.1)
CHLORIDE SERPL-SCNC: 100 MMOL/L (ref 94–109)
CO2 SERPL-SCNC: 33 MMOL/L (ref 20–32)
CREAT SERPL-MCNC: 0.92 MG/DL (ref 0.52–1.04)
DIFFERENTIAL METHOD BLD: ABNORMAL
EOSINOPHIL # BLD AUTO: 0.2 10E9/L (ref 0–0.7)
EOSINOPHIL NFR BLD AUTO: 3.5 %
ERYTHROCYTE [DISTWIDTH] IN BLOOD BY AUTOMATED COUNT: 20.3 % (ref 10–15)
GFR SERPL CREATININE-BSD FRML MDRD: 68 ML/MIN/{1.73_M2}
GLUCOSE SERPL-MCNC: 91 MG/DL (ref 70–99)
HCT VFR BLD AUTO: 33.2 % (ref 35–47)
HGB BLD-MCNC: 10.1 G/DL (ref 11.7–15.7)
IMM GRANULOCYTES # BLD: 0 10E9/L (ref 0–0.4)
IMM GRANULOCYTES NFR BLD: 0.7 %
LYMPHOCYTES # BLD AUTO: 0.3 10E9/L (ref 0.8–5.3)
LYMPHOCYTES NFR BLD AUTO: 7.5 %
MCH RBC QN AUTO: 33.6 PG (ref 26.5–33)
MCHC RBC AUTO-ENTMCNC: 30.4 G/DL (ref 31.5–36.5)
MCV RBC AUTO: 110 FL (ref 78–100)
MONOCYTES # BLD AUTO: 0.4 10E9/L (ref 0–1.3)
MONOCYTES NFR BLD AUTO: 8.9 %
NEUTROPHILS # BLD AUTO: 3.4 10E9/L (ref 1.6–8.3)
NEUTROPHILS NFR BLD AUTO: 78.9 %
NRBC # BLD AUTO: 0 10*3/UL
NRBC BLD AUTO-RTO: 0 /100
PLATELET # BLD AUTO: 150 10E9/L (ref 150–450)
POTASSIUM SERPL-SCNC: 4.2 MMOL/L (ref 3.4–5.3)
PROT SERPL-MCNC: 6.9 G/DL (ref 6.8–8.8)
RBC # BLD AUTO: 3.01 10E12/L (ref 3.8–5.2)
SODIUM SERPL-SCNC: 138 MMOL/L (ref 133–144)
TACROLIMUS BLD-MCNC: <3 UG/L (ref 5–15)
TME LAST DOSE: ABNORMAL H
WBC # BLD AUTO: 4.3 10E9/L (ref 4–11)

## 2019-10-04 PROCEDURE — 25000131 ZZH RX MED GY IP 250 OP 636 PS 637: Performed by: PHYSICIAN ASSISTANT

## 2019-10-04 PROCEDURE — G0378 HOSPITAL OBSERVATION PER HR: HCPCS

## 2019-10-04 PROCEDURE — 96374 THER/PROPH/DIAG INJ IV PUSH: CPT | Performed by: EMERGENCY MEDICINE

## 2019-10-04 PROCEDURE — 99285 EMERGENCY DEPT VISIT HI MDM: CPT | Mod: 25 | Performed by: EMERGENCY MEDICINE

## 2019-10-04 PROCEDURE — 96374 THER/PROPH/DIAG INJ IV PUSH: CPT

## 2019-10-04 PROCEDURE — 80053 COMPREHEN METABOLIC PANEL: CPT | Performed by: EMERGENCY MEDICINE

## 2019-10-04 PROCEDURE — 25000128 H RX IP 250 OP 636: Performed by: EMERGENCY MEDICINE

## 2019-10-04 PROCEDURE — 25000128 H RX IP 250 OP 636: Performed by: PHYSICIAN ASSISTANT

## 2019-10-04 PROCEDURE — 96375 TX/PRO/DX INJ NEW DRUG ADDON: CPT

## 2019-10-04 PROCEDURE — 70450 CT HEAD/BRAIN W/O DYE: CPT

## 2019-10-04 PROCEDURE — P9047 ALBUMIN (HUMAN), 25%, 50ML: HCPCS | Performed by: PHYSICIAN ASSISTANT

## 2019-10-04 PROCEDURE — 93005 ELECTROCARDIOGRAM TRACING: CPT | Performed by: EMERGENCY MEDICINE

## 2019-10-04 PROCEDURE — 93010 ELECTROCARDIOGRAM REPORT: CPT | Mod: Z6 | Performed by: EMERGENCY MEDICINE

## 2019-10-04 PROCEDURE — 76705 ECHO EXAM OF ABDOMEN: CPT

## 2019-10-04 PROCEDURE — 25000132 ZZH RX MED GY IP 250 OP 250 PS 637: Performed by: PHYSICIAN ASSISTANT

## 2019-10-04 PROCEDURE — 85025 COMPLETE CBC W/AUTO DIFF WBC: CPT | Performed by: EMERGENCY MEDICINE

## 2019-10-04 PROCEDURE — 25000132 ZZH RX MED GY IP 250 OP 250 PS 637: Performed by: EMERGENCY MEDICINE

## 2019-10-04 PROCEDURE — 74019 RADEX ABDOMEN 2 VIEWS: CPT

## 2019-10-04 PROCEDURE — 96361 HYDRATE IV INFUSION ADD-ON: CPT

## 2019-10-04 PROCEDURE — 96361 HYDRATE IV INFUSION ADD-ON: CPT | Performed by: EMERGENCY MEDICINE

## 2019-10-04 PROCEDURE — 99285 EMERGENCY DEPT VISIT HI MDM: CPT | Mod: 25

## 2019-10-04 RX ORDER — ASCORBIC ACID 500 MG
500 TABLET ORAL DAILY
Status: DISCONTINUED | OUTPATIENT
Start: 2019-10-05 | End: 2019-10-06 | Stop reason: HOSPADM

## 2019-10-04 RX ORDER — ONDANSETRON 2 MG/ML
4 INJECTION INTRAMUSCULAR; INTRAVENOUS
Status: DISCONTINUED | OUTPATIENT
Start: 2019-10-04 | End: 2019-10-06 | Stop reason: HOSPADM

## 2019-10-04 RX ORDER — MECLIZINE HYDROCHLORIDE 25 MG/1
25 TABLET ORAL ONCE
Status: COMPLETED | OUTPATIENT
Start: 2019-10-04 | End: 2019-10-04

## 2019-10-04 RX ORDER — ZINC SULFATE 50(220)MG
220 CAPSULE ORAL DAILY
Status: DISCONTINUED | OUTPATIENT
Start: 2019-10-04 | End: 2019-10-04

## 2019-10-04 RX ORDER — DAPSONE 100 MG/1
100 TABLET ORAL DAILY
Status: DISCONTINUED | OUTPATIENT
Start: 2019-10-04 | End: 2019-10-04

## 2019-10-04 RX ORDER — MYCOPHENOLIC ACID 360 MG/1
720 TABLET, DELAYED RELEASE ORAL 2 TIMES DAILY
Status: DISCONTINUED | OUTPATIENT
Start: 2019-10-04 | End: 2019-10-06 | Stop reason: HOSPADM

## 2019-10-04 RX ORDER — MECLIZINE HCL 12.5 MG 12.5 MG/1
12.5 TABLET ORAL 3 TIMES DAILY PRN
Status: DISCONTINUED | OUTPATIENT
Start: 2019-10-04 | End: 2019-10-06 | Stop reason: HOSPADM

## 2019-10-04 RX ORDER — ZINC SULFATE 50(220)MG
220 CAPSULE ORAL DAILY
Status: DISCONTINUED | OUTPATIENT
Start: 2019-10-05 | End: 2019-10-06 | Stop reason: HOSPADM

## 2019-10-04 RX ORDER — VALGANCICLOVIR 450 MG/1
450 TABLET, FILM COATED ORAL DAILY
Status: DISCONTINUED | OUTPATIENT
Start: 2019-10-05 | End: 2019-10-06 | Stop reason: HOSPADM

## 2019-10-04 RX ORDER — FAMOTIDINE 20 MG/1
20 TABLET, FILM COATED ORAL DAILY
Status: DISCONTINUED | OUTPATIENT
Start: 2019-10-05 | End: 2019-10-06 | Stop reason: HOSPADM

## 2019-10-04 RX ORDER — ACETAMINOPHEN 325 MG/1
325 TABLET ORAL EVERY 4 HOURS PRN
Status: DISCONTINUED | OUTPATIENT
Start: 2019-10-04 | End: 2019-10-06 | Stop reason: HOSPADM

## 2019-10-04 RX ORDER — LANOLIN ALCOHOL/MO/W.PET/CERES
3 CREAM (GRAM) TOPICAL AT BEDTIME
Status: DISCONTINUED | OUTPATIENT
Start: 2019-10-04 | End: 2019-10-06 | Stop reason: HOSPADM

## 2019-10-04 RX ORDER — MYCOPHENOLIC ACID 360 MG/1
360 TABLET, DELAYED RELEASE ORAL 3 TIMES DAILY
COMMUNITY
End: 2019-10-14

## 2019-10-04 RX ORDER — MECLIZINE HYDROCHLORIDE 25 MG/1
25 TABLET ORAL DAILY
Status: DISCONTINUED | OUTPATIENT
Start: 2019-10-05 | End: 2019-10-04

## 2019-10-04 RX ORDER — CYCLOSPORINE 100 MG/1
225 CAPSULE, LIQUID FILLED ORAL 2 TIMES DAILY
COMMUNITY
End: 2019-10-29

## 2019-10-04 RX ORDER — ONDANSETRON 2 MG/ML
4 INJECTION INTRAMUSCULAR; INTRAVENOUS EVERY 6 HOURS PRN
Status: DISCONTINUED | OUTPATIENT
Start: 2019-10-04 | End: 2019-10-06 | Stop reason: HOSPADM

## 2019-10-04 RX ORDER — PROCHLORPERAZINE MALEATE 5 MG
10 TABLET ORAL EVERY 6 HOURS PRN
Status: DISCONTINUED | OUTPATIENT
Start: 2019-10-04 | End: 2019-10-06 | Stop reason: HOSPADM

## 2019-10-04 RX ORDER — LORAZEPAM 2 MG/ML
1 INJECTION INTRAMUSCULAR ONCE
Status: COMPLETED | OUTPATIENT
Start: 2019-10-04 | End: 2019-10-04

## 2019-10-04 RX ORDER — MAGNESIUM OXIDE 400 MG/1
400 TABLET ORAL DAILY
Status: DISCONTINUED | OUTPATIENT
Start: 2019-10-05 | End: 2019-10-06 | Stop reason: HOSPADM

## 2019-10-04 RX ORDER — LIDOCAINE 40 MG/G
CREAM TOPICAL
Status: DISCONTINUED | OUTPATIENT
Start: 2019-10-04 | End: 2019-10-06 | Stop reason: HOSPADM

## 2019-10-04 RX ORDER — NALOXONE HYDROCHLORIDE 0.4 MG/ML
.1-.4 INJECTION, SOLUTION INTRAMUSCULAR; INTRAVENOUS; SUBCUTANEOUS
Status: DISCONTINUED | OUTPATIENT
Start: 2019-10-04 | End: 2019-10-06 | Stop reason: HOSPADM

## 2019-10-04 RX ORDER — PROCHLORPERAZINE 25 MG
25 SUPPOSITORY, RECTAL RECTAL EVERY 12 HOURS PRN
Status: DISCONTINUED | OUTPATIENT
Start: 2019-10-04 | End: 2019-10-06 | Stop reason: HOSPADM

## 2019-10-04 RX ORDER — FAMOTIDINE 20 MG/1
20 TABLET, FILM COATED ORAL DAILY
Status: DISCONTINUED | OUTPATIENT
Start: 2019-10-04 | End: 2019-10-04

## 2019-10-04 RX ORDER — ASPIRIN 325 MG
325 TABLET ORAL DAILY
Status: DISCONTINUED | OUTPATIENT
Start: 2019-10-05 | End: 2019-10-06 | Stop reason: HOSPADM

## 2019-10-04 RX ORDER — ASCORBIC ACID 500 MG
500 TABLET ORAL DAILY
Status: DISCONTINUED | OUTPATIENT
Start: 2019-10-04 | End: 2019-10-04

## 2019-10-04 RX ORDER — ASPIRIN 325 MG
325 TABLET ORAL DAILY
Status: DISCONTINUED | OUTPATIENT
Start: 2019-10-04 | End: 2019-10-04

## 2019-10-04 RX ORDER — ONDANSETRON 2 MG/ML
4 INJECTION INTRAMUSCULAR; INTRAVENOUS ONCE
Status: DISCONTINUED | OUTPATIENT
Start: 2019-10-04 | End: 2019-10-04

## 2019-10-04 RX ORDER — DAPSONE 100 MG/1
100 TABLET ORAL DAILY
Status: DISCONTINUED | OUTPATIENT
Start: 2019-10-05 | End: 2019-10-06 | Stop reason: HOSPADM

## 2019-10-04 RX ORDER — ALBUMIN (HUMAN) 12.5 G/50ML
12.5 SOLUTION INTRAVENOUS ONCE
Status: COMPLETED | OUTPATIENT
Start: 2019-10-04 | End: 2019-10-04

## 2019-10-04 RX ORDER — ONDANSETRON 4 MG/1
4 TABLET, ORALLY DISINTEGRATING ORAL EVERY 6 HOURS PRN
Status: DISCONTINUED | OUTPATIENT
Start: 2019-10-04 | End: 2019-10-06 | Stop reason: HOSPADM

## 2019-10-04 RX ADMIN — ALBUMIN HUMAN 12.5 G: 0.25 SOLUTION INTRAVENOUS at 20:14

## 2019-10-04 RX ADMIN — CYCLOSPORINE 225 MG: 100 CAPSULE, LIQUID FILLED ORAL at 20:13

## 2019-10-04 RX ADMIN — LORAZEPAM 1 MG: 2 INJECTION INTRAMUSCULAR; INTRAVENOUS at 16:04

## 2019-10-04 RX ADMIN — SODIUM CHLORIDE 1000 ML: 9 INJECTION, SOLUTION INTRAVENOUS at 13:39

## 2019-10-04 RX ADMIN — MELATONIN 1000 UNITS: at 20:13

## 2019-10-04 RX ADMIN — MECLIZINE HYDROCHLORIDE 25 MG: 25 TABLET ORAL at 20:17

## 2019-10-04 RX ADMIN — MECLIZINE HYDROCHLORIDE 25 MG: 25 TABLET ORAL at 13:49

## 2019-10-04 RX ADMIN — MYCOPHENOLIC ACID 720 MG: 360 TABLET, DELAYED RELEASE ORAL at 20:13

## 2019-10-04 RX ADMIN — SODIUM CHLORIDE 1000 ML: 9 INJECTION, SOLUTION INTRAVENOUS at 11:56

## 2019-10-04 ASSESSMENT — MIFFLIN-ST. JEOR
SCORE: 1347
SCORE: 1348.36

## 2019-10-04 NOTE — PHARMACY-ADMISSION MEDICATION HISTORY
Admission medication history interview status for the 10/4/2019 admission is complete. See Epic admission navigator for allergy information, pharmacy, prior to admission medications and immunization status.     Medication history interview sources:  Patient and spouse, Discharge medication list from 10/1/19, [place] pharmacy records    Changes made to PTA medication list (reason)  Added: None  Deleted: None  Changed:   - Myfortic (Brand name for mycophenolic acid) 360mg Take 2 tablets by mouth twice daily changed to Mycophenolic acid (generic) 360mg with same directions (Pharmacy filled generic medication)  - Gengraf (Brand name for cyclosporine)  capsules Take 225mg by mouth twice daily changed to cyclosporine (generic) with same directions (Pharmacy filled generic medication)    Additional medication history information (including reliability of information, actions taken by pharmacist):  - Patient and spouse were moderate historians, but stated that the patient takes all medications on the discharge summary from 10/1/19  - Patient is taking generic cyclosporine and generic mycophenolic acid  - Patient states that she vomited 30 minutes after taking her morning medications today.      Prior to Admission medications    Medication Sig Last Dose Taking? Auth Provider   acetaminophen-caffeine (EXCEDRIN TENSION HEADACHE) 500-65 MG TABS Take 1 tablet by mouth daily as needed (tension headache) Past Month at Unknown time Yes Mary Alcantara PA   ascorbic acid 500 MG TABS Take 1 tablet (500 mg) by mouth daily 10/4/2019 at AM Yes Mary Alcantara PA   aspirin (ASA) 325 MG tablet Take 1 tablet (325 mg) by mouth daily 10/4/2019 at AM Yes Mary Alcantara PA   Cholecalciferol (VITAMIN D-3) 1000 units CAPS Take 1,000 Units by mouth 2 times daily 10/4/2019 at AM Yes Mary Alcantara PA   cycloSPORINE modified (GENERIC EQUIVALENT) 100 MG capsule Take 225 mg by mouth 2 times daily Patient has 25mg and 100mg capsules  10/4/2019 at 0600 Yes Unknown, Entered By History   dapsone (ACZONE) 100 MG tablet Take 1 tablet (100 mg) by mouth daily 10/4/2019 at AM Yes Mary Alcantara PA   famotidine (PEPCID) 20 MG tablet Take 1 tablet (20 mg) by mouth daily 10/4/2019 at AM Yes Mary Alcantara PA   levothyroxine (SYNTHROID/LEVOTHROID) 175 MCG tablet Take 1 tablet (175 mcg) by mouth daily 10/4/2019 at AM Yes Mary Alcantara PA   magnesium oxide (MAG-OX) 400 MG tablet Take 1 tablet (400 mg) by mouth daily 10/4/2019 at AM Yes Mary Alcantara PA   mycophenolic acid (GENERIC EQUIVALENT) 360 MG EC tablet Take 720 mg by mouth 2 times daily 10/4/2019 at 0600 Yes Unknown, Entered By History   simethicone (MYLICON) 80 MG chewable tablet Take 1 tablet (80 mg) by mouth every 6 hours as needed for cramping Past Week at Unknown time Yes Mary Alcantara PA   valGANciclovir (VALCYTE) 450 MG tablet Take 1 tablet (450 mg) by mouth daily 10/4/2019 at AM Yes Mary Alcantara PA   zinc sulfate (ZINCATE) 220 (50 Zn) MG capsule Take 1 capsule (220 mg) by mouth daily 10/4/2019 at AM Yes Mary Alcantara PA         Medication history completed by: Jamie Curiel, PharmD  PGY1 Pharmacy Resident

## 2019-10-04 NOTE — ED PROVIDER NOTES
History     Chief Complaint   Patient presents with     Nausea & Vomiting     Dizziness     HPI  Nicci Pemberton is a 59 year old female with a medical history significant for end stage liver disease s/p liver transplant (19), severe, malnutrition, HTN, and hypothyroidism who presents to the Emergency Department for evaluation of nausea, vomiting, and dizziness.  She states that she began to feel dizzy while getting up to the bathroom this morning.  She states the dizziness feels like the room is spinning or being on a boat.  She notices this most with position change, but this can happen at rest.  She has no headache.  She has no prior history of vertigo.  She has no fever or chills.      PAST MEDICAL HISTORY:   Past Medical History:   Diagnosis Date     Anemia      Cellulitis      Cirrhosis of liver (H) 2018     Heterozygous alpha 1-antitrypsin deficiency (H)      High hepatic iron concentration determined by biopsy of liver      Liver cirrhosis secondary to ANGULO (H)      Liver transplanted (H) 2019     Lymphedema      Osteoarthritis      SBP (spontaneous bacterial peritonitis) (H) 2019 in [place]       PAST SURGICAL HISTORY:   Past Surgical History:   Procedure Laterality Date     BENCH LIVER N/A 2019    Procedure: BACKBENCH PREPARATION, LIVER;  Surgeon: Mandeep Alford MD;  Location: [facility] OR     COLONOSCOPY N/A 2018    Procedure: COLONOSCOPY;  colonoscopy;  Surgeon: Hugo Patel MD;  [facility]: [facility]     IR FEEDING TUBE PLACEMENT MERCEDEZ PRESTON/MD  2019     IR FLUORO 0-1 HOUR  2019     IR LUMBAR PUNCTURE  2019     TRANSPLANT LIVER RECIPIENT  DONOR N/A 2019    Procedure: TRANSPLANT, LIVER, RECIPIENT,  DONOR;  Surgeon: Mandeep Alford MD;  Location: [facility] OR       FAMILY HISTORY:   Family History   Problem Relation Age of Onset     Cirrhosis No family hx of      Liver Cancer No family hx of        SOCIAL HISTORY:   Social History     Tobacco  Use     Smoking status: Former Smoker     Last attempt to quit: 2011     Years since quittin.7     Smokeless tobacco: Never Used     Tobacco comment: weekend smoker    Substance Use Topics     Alcohol use: No       Discharge Medication List as of 10/6/2019  1:56 PM      START taking these medications    Details   meclizine (ANTIVERT) 12.5 MG tablet Take 1 tablet (12.5 mg) by mouth 3 times daily as needed for dizziness, Disp-20 tablet, R-0, E-Prescribe      sodium chloride (OCEAN) 0.65 % nasal spray Spray 1 spray into both nostrils every hour as needed for congestion, Disp-1 Bottle, R-0, E-Prescribe      vancomycin (VANCOCIN HCL) 125 MG capsule Take 1 capsule (125 mg) by mouth 2 times daily, Disp-20 capsule, R-0, E-Prescribe      amoxicillin (AMOXIL) 500 MG capsule Take 1 capsule (500 mg) by mouth every 8 hours, Disp-21 capsule, R-0, E-Prescribe         CONTINUE these medications which have NOT CHANGED    Details   acetaminophen-caffeine (EXCEDRIN TENSION HEADACHE) 500-65 MG TABS Take 1 tablet by mouth daily as needed (tension headache), OTC      ascorbic acid 500 MG TABS Take 1 tablet (500 mg) by mouth daily, Disp-30 tablet, R-0, E-Prescribe      aspirin (ASA) 325 MG tablet Take 1 tablet (325 mg) by mouth daily, Disp-30 tablet, R-0, E-Prescribe      Cholecalciferol (VITAMIN D-3) 1000 units CAPS Take 1,000 Units by mouth 2 times daily, Disp-60 capsule, R-0, E-Prescribe      cycloSPORINE modified (GENERIC EQUIVALENT) 100 MG capsule Take 225 mg by mouth 2 times daily Patient has 25mg and 100mg capsules, Historical      dapsone (ACZONE) 100 MG tablet Take 1 tablet (100 mg) by mouth daily, Disp-30 tablet, R-0, E-Prescribe      famotidine (PEPCID) 20 MG tablet Take 1 tablet (20 mg) by mouth daily, Disp-30 tablet, R-0, E-Prescribe      levothyroxine (SYNTHROID/LEVOTHROID) 175 MCG tablet Take 1 tablet (175 mcg) by mouth daily, Disp-30 tablet, R-0, E-Prescribe      magnesium oxide (MAG-OX) 400 MG tablet Take 1 tablet  "(400 mg) by mouth daily, Disp-30 tablet, R-0, E-Prescribe      mycophenolic acid (GENERIC EQUIVALENT) 360 MG EC tablet Take 720 mg by mouth 2 times daily, Historical      simethicone (MYLICON) 80 MG chewable tablet Take 1 tablet (80 mg) by mouth every 6 hours as needed for cramping, OTC      valGANciclovir (VALCYTE) 450 MG tablet Take 1 tablet (450 mg) by mouth daily, Disp-30 tablet, R-0, E-Prescribe      zinc sulfate (ZINCATE) 220 (50 Zn) MG capsule Take 1 capsule (220 mg) by mouth daily, Disp-30 capsule, R-0, E-Prescribe                Allergies   Allergen Reactions     Food      Fruits with cores and pits  Apples     Sulfa Drugs Unknown     Swollen joints     Furosemide Rash         I have reviewed the Medications, Allergies, Past Medical and Surgical History, and Social History in the Epic system.    Review of Systems   All other systems reviewed and are negative.      Physical Exam   BP: 110/74  Heart Rate: 75  Temp: 97.3  F (36.3  C)  Resp: 18  Height: 154.9 cm (5' 1\")  Weight: 83.5 kg (184 lb)  SpO2: 96 %      Physical Exam  Vitals signs and nursing note reviewed.   Constitutional:       General: She is not in acute distress.     Appearance: She is not diaphoretic.   HENT:      Head: Normocephalic and atraumatic.      Right Ear: Tympanic membrane normal. There is no impacted cerumen.      Left Ear: Tympanic membrane normal. There is no impacted cerumen.      Nose: Nose normal. No congestion or rhinorrhea.      Mouth/Throat:      Pharynx: No oropharyngeal exudate or posterior oropharyngeal erythema.   Eyes:      Conjunctiva/sclera: Conjunctivae normal.      Pupils: Pupils are equal, round, and reactive to light.   Neck:      Musculoskeletal: Normal range of motion and neck supple.   Cardiovascular:      Rate and Rhythm: Normal rate.   Pulmonary:      Effort: Pulmonary effort is normal. No respiratory distress.   Abdominal:      Palpations: Abdomen is soft.      Tenderness: There is no tenderness. There is no " guarding.   Musculoskeletal: Normal range of motion.   Skin:     General: Skin is warm and dry.   Neurological:      General: No focal deficit present.      Mental Status: She is alert and oriented to person, place, and time.      Cranial Nerves: No cranial nerve deficit.      Motor: No weakness.      Coordination: Coordination normal.      Gait: Gait normal.      Deep Tendon Reflexes: Reflexes normal.   Psychiatric:         Behavior: Behavior normal.         Thought Content: Thought content normal.         ED Course        Procedures             EKG Interpretation:      Interpreted by Otto Mercer MD  Time reviewed: 1141  Symptoms at time of EKG: dizzy  Rhythm: normal sinus   Rate: normal  Axis: normal  Ectopy: none  Conduction: normal  ST Segments/ T Waves: No ST-T wave changes  Q Waves: none  Comparison to prior: Unchanged    Clinical Impression: normal EKG          Critical Care time:  none             Labs Ordered and Resulted from Time of ED Arrival Up to the Time of Departure from the ED   CBC WITH PLATELETS DIFFERENTIAL - Abnormal; Notable for the following components:       Result Value    RBC Count 3.01 (*)     Hemoglobin 10.1 (*)     Hematocrit 33.2 (*)      (*)     MCH 33.6 (*)     MCHC 30.4 (*)     RDW 20.3 (*)     Absolute Lymphocytes 0.3 (*)     All other components within normal limits   COMPREHENSIVE METABOLIC PANEL - Abnormal; Notable for the following components:    Carbon Dioxide 33 (*)     Urea Nitrogen 34 (*)     Bilirubin Total 1.8 (*)     Albumin 3.2 (*)     Alkaline Phosphatase 176 (*)     All other components within normal limits   ORTHOSTATIC BLOOD PRESSURE AND PULSE            Assessments & Plan (with Medical Decision Making)   This is a 58 yo F with a history ANGULO s/p liver transplant on 8/18/19 who presents with dizziness consistent with vertigo.  Lab evaluation was unremarkable.  No evidence of infection or sepsis.  A Head CT was normal and she has no neurologic  deficits.  She had continued vertigo despite Meclizine and Ativan.  She will be admitted to the transplant team for further management.     I have reviewed the nursing notes.    I have reviewed the findings, diagnosis, plan and need for follow up with the patient.    New Prescriptions    No medications on file       Final diagnoses:   None       10/4/2019   [facility], [place], EMERGENCY DEPARTMENT     Otto Mercer MD  10/10/19 0983

## 2019-10-04 NOTE — TELEPHONE ENCOUNTER
Patient Call: General  Route to LPN    Reason for call: Pt is very dizzy and would like a call back asap    Call back needed? Yes    Return Call Needed  Same as documented in contacts section  When to return call?: Same day: Route High Priority

## 2019-10-04 NOTE — ED TRIAGE NOTES
BIBA from home for dizziness that started this morning followed by N/V. Recent liver transplant, reports vomiting after taking am transplant meds, but unaware if she threw her medications up. No meds given by EMS

## 2019-10-04 NOTE — H&P
[facility], [place]    History and Physical  Solid Organ Transplant     Date of Admission:  10/4/2019    Assessment & Plan   Nicci Pemberton is a 59 year old female with a history of end stage liver disease secondary to ANGULO and heterozygous alpha-1 antitrypsin deficiency, HTN, and hypothyroidism. Underwent  donor liver transplant on 19. Had a prolonged hospital stay, recently discharge to home on 10/1/19. Now presenting with dizziness and one episode of emesis after taking meds this AM.     S/p Liver transplant: LFTs stable, at baseline     Immunosuppression:  Maintenance immunosupression with mycophenolate (Myfortic 720mg BID) and Cyclosporine 225 mg BID (tacrolimus stopped previously due to delirium).  CSA  level goal 200-300.    Vertigo: Pt states everything seems to be tilted the left. Appears to have some nystagmus on exam. Will trial meclizine. Fluid resuscitated in the emergency department. Will have PT assess patient.    Abd fullness: Will assess for constipation and ascites    Code Status   Full Code    Primary Care Physician   Wale Thurston    Chief Complaint   Dizziness    History is obtained from the patient and her     History of Present Illness   Nicci Pemberton is a 59 year old female with a history of end stage liver disease secondary to ANGULO and heterozygous alpha-1 antitrypsin deficiency, HTN, and hypothyroidism. Underwent  donor liver transplant on 19. Had a prolonged hospital stay, recently discharged to home from TCU on 10/1/19. States that everything was going well at home until this morning. She got up to go to the bathroom, and when she stood up she felt like the whole room was tilting to the left. She denies any one sided weakness or changes in her speech. She denies any fevers, chills. Did have some ear pain/pressure about 5 days ago but now resolved. Did also have one episode of emesis after taking meds this AM.  She presented  to the ED this AM due to the dizziness and will be admitted for observation.     Past Medical History    I have reviewed this patient's medical history and updated it with pertinent information if needed.   Past Medical History:   Diagnosis Date     Anemia      Cellulitis      Cirrhosis of liver (H) 2018     Heterozygous alpha 1-antitrypsin deficiency (H)      High hepatic iron concentration determined by biopsy of liver      Liver cirrhosis secondary to ANGULO (H)      Liver transplanted (H) 2019     Lymphedema      Osteoarthritis      SBP (spontaneous bacterial peritonitis) (H) 2019 in [place]       Past Surgical History   I have reviewed this patient's surgical history and updated it with pertinent information if needed.  Past Surgical History:   Procedure Laterality Date     BENCH LIVER N/A 2019    Procedure: BACKBENCH PREPARATION, LIVER;  Surgeon: Mandeep Alford MD;  Location: [facility] OR     COLONOSCOPY N/A 2018    Procedure: COLONOSCOPY;  colonoscopy;  Surgeon: Hugo Patel MD;  [facility]     IR FEEDING TUBE PLACEMENT W FLUORO/MD  2019     IR FLUORO 0-1 HOUR  2019     IR LUMBAR PUNCTURE  2019     TRANSPLANT LIVER RECIPIENT  DONOR N/A 2019    Procedure: TRANSPLANT, LIVER, RECIPIENT,  DONOR;  Surgeon: Mandeep Alford MD;  Location: [facility] OR       Prior to Admission Medications   Prior to Admission Medications   Prescriptions Last Dose Informant Patient Reported? Taking?   Cholecalciferol (VITAMIN D-3) 1000 units CAPS 10/4/2019 at AM  No Yes   Sig: Take 1,000 Units by mouth 2 times daily   MYFORTIC (BRAND) 360 MG EC tablet   No Yes   Sig: Take 2 tablets (720 mg) by mouth 2 times daily   acetaminophen-caffeine (EXCEDRIN TENSION HEADACHE) 500-65 MG TABS Past Month at Unknown time  No Yes   Sig: Take 1 tablet by mouth daily as needed (tension headache)   ascorbic acid 500 MG TABS 10/4/2019 at AM  No Yes   Sig: Take 1 tablet (500 mg) by  mouth daily   aspirin (ASA) 325 MG tablet 10/4/2019 at AM  No Yes   Sig: Take 1 tablet (325 mg) by mouth daily   cycloSPORINE modified (GENERIC EQUIVALENT) 100 MG capsule 10/4/2019  Yes Yes   Sig: Take 225 mg by mouth 2 times daily Patient has 25mg and 100mg capsules   dapsone (ACZONE) 100 MG tablet 10/4/2019 at AM  No Yes   Sig: Take 1 tablet (100 mg) by mouth daily   famotidine (PEPCID) 20 MG tablet 10/4/2019 at AM  No Yes   Sig: Take 1 tablet (20 mg) by mouth daily   levothyroxine (SYNTHROID/LEVOTHROID) 175 MCG tablet 10/4/2019 at AM  No Yes   Sig: Take 1 tablet (175 mcg) by mouth daily   magnesium oxide (MAG-OX) 400 MG tablet   No Yes   Sig: Take 1 tablet (400 mg) by mouth daily   simethicone (MYLICON) 80 MG chewable tablet   No Yes   Sig: Take 1 tablet (80 mg) by mouth every 6 hours as needed for cramping   valGANciclovir (VALCYTE) 450 MG tablet   No Yes   Sig: Take 1 tablet (450 mg) by mouth daily   zinc sulfate (ZINCATE) 220 (50 Zn) MG capsule   No Yes   Sig: Take 1 capsule (220 mg) by mouth daily      Facility-Administered Medications: None     Allergies   Allergies   Allergen Reactions     Food      Fruits with cores and pits  Apples     Sulfa Drugs Unknown     Swollen joints     Furosemide Rash       Social History   Nicci Pemberton  reports that she quit smoking about 8 years ago. She has never used smokeless tobacco. She reports that she does not drink alcohol or use drugs.    Family History   I have reviewed this patient's family history and updated it with pertinent information if needed.   Family History   Problem Relation Age of Onset     Cirrhosis No family hx of      Liver Cancer No family hx of        Review of Systems   The 10 point Review of Systems is negative other than noted in the HPI or here. Some abdominal fullness after eating or drinking fluids. Occasionally she feels like it's difficult to take a deep breath due to abdominal fullness. She states her bowel movements have been  small.    Physical Exam   Temp: 98.1  F (36.7  C) Temp src: Oral BP: 128/84 Pulse: 85 Heart Rate: 78 Resp: 20 SpO2: 96 % O2 Device: None (Room air) Oxygen Delivery: 3 LPM  Vital Signs with Ranges  Temp:  [97.3  F (36.3  C)-98.1  F (36.7  C)] 98.1  F (36.7  C)  Pulse:  [77-85] 85  Heart Rate:  [70-79] 78  Resp:  [11-23] 20  BP: (110-128)/(74-84) 128/84  SpO2:  [85 %-99 %] 96 %  184 lbs 4.8 oz    Constitutional: Somewhat sleepy after meds from ED, but arousable, cooperative, no apparent distress, and appears stated age.  Eyes: Pupils equal, round, extra ocular muscles intact, sclera clear, conjunctiva normal, might have some nystagmus  ENT: Normocephalic, without obvious abnormality, atraumatic  Respiratory: No increased work of breathing, good air exchange, clear to auscultation bilaterally, mild crackles at L base  Cardiovascular: Regular rate and rhythm, normal S1 and S2, no S3 or S4, and no murmur noted.  GI: Soft, non-distended, non-tender, appears to have some ascites  Genitourinary:  Voiding  Skin: Normal skin color, texture  Neurologic: Somewhat sleepy after meds from ED, but arousable, alert, oriented to name, place and time.    Neuropsychiatric: Calm, normal eye contact, alert, normal affect, oriented to self, place, time and situation, memory for past and recent events intact and thought process normal.    Attestation:    The patient has not been seen and evaluated by me.   Vital signs, labs, medications and orders were reviewed.   When obtained, diagnostic images were reviewed by me and interpreted as above.    The care plan was discussed with the multidisciplinary team and I agree with the findings and plan in this note, with any differences recorded in blue.    Immunosuppressive medication management was reviewed and adjusted as reflected in the note and orders.     .

## 2019-10-05 ENCOUNTER — APPOINTMENT (OUTPATIENT)
Dept: PHYSICAL THERAPY | Facility: CLINIC | Age: 59
End: 2019-10-05
Attending: PHYSICIAN ASSISTANT
Payer: COMMERCIAL

## 2019-10-05 ENCOUNTER — APPOINTMENT (OUTPATIENT)
Dept: MRI IMAGING | Facility: CLINIC | Age: 59
End: 2019-10-05
Attending: NURSE PRACTITIONER
Payer: COMMERCIAL

## 2019-10-05 LAB
ALBUMIN SERPL-MCNC: 3 G/DL (ref 3.4–5)
ALP SERPL-CCNC: 145 U/L (ref 40–150)
ALT SERPL W P-5'-P-CCNC: 24 U/L (ref 0–50)
ANION GAP SERPL CALCULATED.3IONS-SCNC: 5 MMOL/L (ref 3–14)
APTT PPP: 31 SEC (ref 22–37)
AST SERPL W P-5'-P-CCNC: 24 U/L (ref 0–45)
BASOPHILS # BLD AUTO: 0 10E9/L (ref 0–0.2)
BASOPHILS NFR BLD AUTO: 0.9 %
BILIRUB DIRECT SERPL-MCNC: 0.7 MG/DL (ref 0–0.2)
BILIRUB SERPL-MCNC: 1.6 MG/DL (ref 0.2–1.3)
BUN SERPL-MCNC: 26 MG/DL (ref 7–30)
CALCIUM SERPL-MCNC: 8.8 MG/DL (ref 8.5–10.1)
CHLORIDE SERPL-SCNC: 106 MMOL/L (ref 94–109)
CO2 SERPL-SCNC: 29 MMOL/L (ref 20–32)
CREAT SERPL-MCNC: 0.77 MG/DL (ref 0.52–1.04)
CYCLOSPORINE BLD LC/MS/MS-MCNC: 287 UG/L (ref 50–400)
DIFFERENTIAL METHOD BLD: ABNORMAL
EOSINOPHIL # BLD AUTO: 0.2 10E9/L (ref 0–0.7)
EOSINOPHIL NFR BLD AUTO: 4.7 %
ERYTHROCYTE [DISTWIDTH] IN BLOOD BY AUTOMATED COUNT: 20.1 % (ref 10–15)
GFR SERPL CREATININE-BSD FRML MDRD: 84 ML/MIN/{1.73_M2}
GLUCOSE SERPL-MCNC: 73 MG/DL (ref 70–99)
HCT VFR BLD AUTO: 29.5 % (ref 35–47)
HGB BLD-MCNC: 8.8 G/DL (ref 11.7–15.7)
IMM GRANULOCYTES # BLD: 0 10E9/L (ref 0–0.4)
IMM GRANULOCYTES NFR BLD: 0.6 %
INR PPP: 1.19 (ref 0.86–1.14)
INTERPRETATION ECG - MUSE: NORMAL
LYMPHOCYTES # BLD AUTO: 0.3 10E9/L (ref 0.8–5.3)
LYMPHOCYTES NFR BLD AUTO: 7.4 %
MAGNESIUM SERPL-MCNC: 1.6 MG/DL (ref 1.6–2.3)
MCH RBC QN AUTO: 33.8 PG (ref 26.5–33)
MCHC RBC AUTO-ENTMCNC: 29.8 G/DL (ref 31.5–36.5)
MCV RBC AUTO: 114 FL (ref 78–100)
MONOCYTES # BLD AUTO: 0.4 10E9/L (ref 0–1.3)
MONOCYTES NFR BLD AUTO: 12.7 %
NEUTROPHILS # BLD AUTO: 2.5 10E9/L (ref 1.6–8.3)
NEUTROPHILS NFR BLD AUTO: 73.7 %
NRBC # BLD AUTO: 0 10*3/UL
NRBC BLD AUTO-RTO: 0 /100
PHOSPHATE SERPL-MCNC: 4.1 MG/DL (ref 2.5–4.5)
PLATELET # BLD AUTO: 126 10E9/L (ref 150–450)
POTASSIUM SERPL-SCNC: 4.1 MMOL/L (ref 3.4–5.3)
PROT SERPL-MCNC: 6.3 G/DL (ref 6.8–8.8)
RBC # BLD AUTO: 2.6 10E12/L (ref 3.8–5.2)
SODIUM SERPL-SCNC: 141 MMOL/L (ref 133–144)
TME LAST DOSE: NORMAL H
WBC # BLD AUTO: 3.4 10E9/L (ref 4–11)

## 2019-10-05 PROCEDURE — 85610 PROTHROMBIN TIME: CPT | Performed by: PHYSICIAN ASSISTANT

## 2019-10-05 PROCEDURE — 97161 PT EVAL LOW COMPLEX 20 MIN: CPT | Mod: GP

## 2019-10-05 PROCEDURE — 25000132 ZZH RX MED GY IP 250 OP 250 PS 637: Performed by: PHYSICIAN ASSISTANT

## 2019-10-05 PROCEDURE — G0378 HOSPITAL OBSERVATION PER HR: HCPCS

## 2019-10-05 PROCEDURE — 85730 THROMBOPLASTIN TIME PARTIAL: CPT | Performed by: PHYSICIAN ASSISTANT

## 2019-10-05 PROCEDURE — 80158 DRUG ASSAY CYCLOSPORINE: CPT | Performed by: PHYSICIAN ASSISTANT

## 2019-10-05 PROCEDURE — A9585 GADOBUTROL INJECTION: HCPCS | Performed by: RADIOLOGY

## 2019-10-05 PROCEDURE — 80076 HEPATIC FUNCTION PANEL: CPT | Performed by: PHYSICIAN ASSISTANT

## 2019-10-05 PROCEDURE — 25500064 ZZH RX 255 OP 636: Performed by: RADIOLOGY

## 2019-10-05 PROCEDURE — 83735 ASSAY OF MAGNESIUM: CPT | Performed by: PHYSICIAN ASSISTANT

## 2019-10-05 PROCEDURE — 80048 BASIC METABOLIC PNL TOTAL CA: CPT | Performed by: PHYSICIAN ASSISTANT

## 2019-10-05 PROCEDURE — 25000131 ZZH RX MED GY IP 250 OP 636 PS 637: Performed by: PHYSICIAN ASSISTANT

## 2019-10-05 PROCEDURE — 36415 COLL VENOUS BLD VENIPUNCTURE: CPT | Performed by: PHYSICIAN ASSISTANT

## 2019-10-05 PROCEDURE — 87799 DETECT AGENT NOS DNA QUANT: CPT | Performed by: PHYSICIAN ASSISTANT

## 2019-10-05 PROCEDURE — 97112 NEUROMUSCULAR REEDUCATION: CPT | Mod: GP

## 2019-10-05 PROCEDURE — 97530 THERAPEUTIC ACTIVITIES: CPT | Mod: GP

## 2019-10-05 PROCEDURE — 85025 COMPLETE CBC W/AUTO DIFF WBC: CPT | Performed by: PHYSICIAN ASSISTANT

## 2019-10-05 PROCEDURE — 84100 ASSAY OF PHOSPHORUS: CPT | Performed by: PHYSICIAN ASSISTANT

## 2019-10-05 PROCEDURE — 70544 MR ANGIOGRAPHY HEAD W/O DYE: CPT

## 2019-10-05 RX ORDER — GADOBUTROL 604.72 MG/ML
0.1 INJECTION INTRAVENOUS ONCE
Status: COMPLETED | OUTPATIENT
Start: 2019-10-05 | End: 2019-10-05

## 2019-10-05 RX ADMIN — MELATONIN TAB 3 MG 3 MG: 3 TAB at 22:55

## 2019-10-05 RX ADMIN — ZINC SULFATE CAP 220 MG (50 MG ELEMENTAL ZN) 220 MG: 220 (50 ZN) CAP at 08:31

## 2019-10-05 RX ADMIN — CYCLOSPORINE 225 MG: 100 CAPSULE, LIQUID FILLED ORAL at 18:41

## 2019-10-05 RX ADMIN — VALGANCICLOVIR 450 MG: 450 TABLET, FILM COATED ORAL at 08:30

## 2019-10-05 RX ADMIN — CYCLOSPORINE 225 MG: 100 CAPSULE, LIQUID FILLED ORAL at 06:18

## 2019-10-05 RX ADMIN — MYCOPHENOLIC ACID 720 MG: 360 TABLET, DELAYED RELEASE ORAL at 19:58

## 2019-10-05 RX ADMIN — GADOBUTROL 8.3 ML: 604.72 INJECTION INTRAVENOUS at 17:27

## 2019-10-05 RX ADMIN — MAGNESIUM OXIDE TAB 400 MG (241.3 MG ELEMENTAL MG) 400 MG: 400 (241.3 MG) TAB at 08:31

## 2019-10-05 RX ADMIN — ASPIRIN 325 MG ORAL TABLET 325 MG: 325 PILL ORAL at 08:30

## 2019-10-05 RX ADMIN — DAPSONE 100 MG: 100 TABLET ORAL at 08:31

## 2019-10-05 RX ADMIN — MELATONIN 1000 UNITS: at 19:58

## 2019-10-05 RX ADMIN — MYCOPHENOLIC ACID 720 MG: 360 TABLET, DELAYED RELEASE ORAL at 08:30

## 2019-10-05 RX ADMIN — LEVOTHYROXINE SODIUM 175 MCG: 100 TABLET ORAL at 08:32

## 2019-10-05 RX ADMIN — MELATONIN 1000 UNITS: at 08:31

## 2019-10-05 RX ADMIN — FAMOTIDINE 20 MG: 20 TABLET ORAL at 08:31

## 2019-10-05 RX ADMIN — OXYCODONE HYDROCHLORIDE AND ACETAMINOPHEN 500 MG: 500 TABLET ORAL at 08:32

## 2019-10-05 ASSESSMENT — MIFFLIN-ST. JEOR: SCORE: 1349.72

## 2019-10-05 NOTE — PROGRESS NOTES
OBSERVATION GOALS:        -diagnostic tests and consults completed and resulted - PENDING    -vital signs normal or at patient baseline - NOT MET - Vital signs normal, on assessment patient is more awake than at 0000, still requiring 1-3L oxygen per NC overnight    -tolerating oral intake to maintain hydration  - PENDING - ate small amount of evening meal per evening shift

## 2019-10-05 NOTE — PROGRESS NOTES
Transplant Surgery  Inpatient Daily Progress Note  10/05/2019    Assessment & Plan:  60 yo w/ history of ANGULO and heterozygous alpha-1 antitrypsin deficiency, HTN, and hypothyroidism. Underwent  donor liver transplant on 19. Had a prolonged hospital stay complicated by encephalopathy, recently discharge to home on 10/1/19. On 10/4/19, she developed vertigo and vomiting with standing and presented for evaluation.    Graft function: LFTs stable. Tbili 1.6, decreased from recent.   Immunosuppression management:   CSA: Level goal 200-300. Level pending.  Myfortic: 720mg BID  Complexity of management: Low. Contributing factors: nausea  Hematology:   Anemia: Hgb 8.8, consistent with recent labs. Likely hemo concentrated on admission. Entire CBC looks dilute.  Cardiorespiratory:   Hypoxia: Required O2 overnight. 93% on RA this morning. Concern for DIXIE noted at TCU. They recommended that pt have a sleep study.  GI/Nutrition: Reg diet. No nausea at present.    Endocrine:   Hx hypothyroidism: Continue levothyroxine.  Fluid/Electrolytes: MIVF: Received boluses in ED.  : No acute issues.   Infectious disease: Afebrile  Neuro:   Vertigo: Onset yesterday with associated vomiting. 10/4 Head CT negative. Asymptomatic at rest. Consult Neurology.  Prophylaxis: DVT, fall, GI, viral (valcyte), pneumocystis (dapsone)  Disposition:7A     Medical Decision Making: Medium  Subsequent visit 02588 (moderate level decision making)    VEENA/Fellow/Resident Provider: Sharon Merchant NP     Faculty: Nghia Amaral M.D.    __________________________________________________________________  Transplant History:    2019 (Liver), Postoperative day: 48     Interval History: History is obtained from the patient  Overnight events: Admitted last night with vertigo and vomiting. Slept soundly overnight after a dose of ativan in the ED. Asymptomatic this morning while lying. Denies ear pressure or pain. Denies dyspnea.    ROS:   A  "10-point review of systems was negative except as noted above.    Curent Meds:    aspirin  325 mg Oral Daily     cycloSPORINE modified  225 mg Oral BID     dapsone  100 mg Oral Daily     famotidine  20 mg Oral Daily     influenza vaccine adult (product based on age)  0.5 mL Intramuscular Prior to discharge     levothyroxine  175 mcg Oral Daily     magnesium oxide  400 mg Oral Daily     melatonin  3 mg Oral At Bedtime     mycophenolic acid  720 mg Oral BID     sodium chloride (PF)  3 mL Intracatheter Q8H     valGANciclovir  450 mg Oral Daily     vitamin C  500 mg Oral Daily     vitamin D3  1,000 Units Oral BID     zinc sulfate  220 mg Oral Daily       Physical Exam:     Admit Weight: 83.5 kg (184 lb)    Current Vitals:   /74 (BP Location: Left arm)   Pulse 80   Temp 97.8  F (36.6  C) (Oral)   Resp 16   Ht 1.549 m (5' 1\")   Wt 83.7 kg (184 lb 9.6 oz)   SpO2 99%   BMI 34.88 kg/m      Vital sign ranges:    Temp:  [97.3  F (36.3  C)-98.5  F (36.9  C)] 97.8  F (36.6  C)  Pulse:  [77-85] 80  Heart Rate:  [70-83] 83  Resp:  [11-23] 16  BP: (101-128)/(66-84) 120/74  SpO2:  [85 %-99 %] 99 %    General Appearance: in no apparent distress.   ENT: Small amount dried blood in right ear, TM intact  Skin: normal, warm, dry  Heart: RRR  Lungs: CTA, slightly diminished  Abdomen: Soft  : No durbin  Extremities: edema: trace BLE  Neurologic: A&O x4, strength 5/5, PERRLA, sensation intact    Frailty Scores     There is no flowsheet data to display.          Data:   CMP  Recent Labs   Lab 10/05/19  0539 10/04/19  1130 10/03/19  0936    138 136   POTASSIUM 4.1 4.2 4.4   CHLORIDE 106 100 98   CO2 29 33* 34*   GLC 73 91 83   BUN 26 34* 34*   CR 0.77 0.92 0.96   GFRESTIMATED 84 68 65   GFRESTBLACK >90 79 75   JACOB 8.8 9.6 10.0   MAG 1.6  --  1.6   PHOS 4.1  --  4.4   ALBUMIN 3.0* 3.2* 3.6   BILITOTAL 1.6* 1.8* 1.9*   ALKPHOS 145 176* 188*   AST 24 23 24   ALT 24 28 33     CBC  Recent Labs   Lab 10/05/19  0539 " 10/04/19  1130   HGB 8.8* 10.1*   WBC 3.4* 4.3   * 150     Attestation:    The patient has been seen and evaluated by me.   Vital signs, labs, medications and orders were reviewed.   When obtained, diagnostic images were reviewed by me and interpreted as above.    The care plan was discussed with the multidisciplinary team and I agree with the findings and plan in this note, with any differences recorded in blue.    Immunosuppressive medication management was reviewed and adjusted as reflected in the note and orders.     .

## 2019-10-05 NOTE — PLAN OF CARE
1700 - 2300 - Nicci was admitted from the ED w complaints of dizziness. VSS on room air to 2L nasal canula when sleeping. (Pt got IV ativan in the ED). Pt denies pain and nausea, although had an episode of emesis this morning. On regular diet, tolerated a little bit of super and kept her pills down. Xray and ultrasound ordered. Pt up to the bathroom with assist of one and a walker. Voided twice this shift.  at bedside and supportive. Will continue to monitor and notify team w concerns.

## 2019-10-05 NOTE — PLAN OF CARE
7A PT  Discharge Planner PT   Patient plan for discharge: Home with , HHPT  Current status: PT evaluation and treatment complete. [place] Hallpike performed B to assess for vertigo/BPPV, negative with no symptoms or nystagmus noted. Head thrust performed with no deficits noted, orthostatic BPs measured and negative. Pt overall reports feeling much better compared to yesterday, only minor dizziness when standing up earlier this AM. Pt explains dizziness as wavy/off-balance rather than true spinning dizziness. Pt overall SBA for bed mobility, transfers, and gait with FWW. PT to continue to follow peripherally as pt currently observation status and has safe discharge plan in place.   Barriers to return to prior living situation: Medical status  Recommendations for discharge: Home with assist + HHPT  Rationale for recommendations: Pt has good support of  to assist as needed upon discharge, will benefit from continued HHPT to progress strength, endurance, and reduce assistive device use. Pt may benefit from OP follow up to further assess dizziness symptoms if they persist.       Entered by: Naida Khalli 10/05/2019 12:04 PM

## 2019-10-05 NOTE — PROGRESS NOTES
OBSERVATION GOALS:      -diagnostic tests and consults completed and resulted - PENDING    -vital signs normal or at patient baseline - NOT MET - Vital signs normal but on assessment patient is somnolent and unable to concentrate as well as requiring 1-3L oxygen per NC overnight    -tolerating oral intake to maintain hydration  - PENDING - ate small amount of evening meal per evening shift

## 2019-10-05 NOTE — CONSULTS
[facility]  Neurology Consultation    Patient Name:  Nicci Pemberton  MRN:  [id number]    :  1960  Date of Service:  2019  Primary care provider:  Wale Thurston      Neurology consultation service was asked to see Nicci Pemberton by Sharon Merchant to evaluate vertigo.    History of Present Illness:   59 year old female h/o liver transplant 2019, alpha-1 antitrypsin deficiency, hypertension, hypothyroidism who presented 10/4/2019 for vertigo.  Neurology was consulted for vertigo.   was present in patient room who assisted in providing history.    Patient reports that vertigo started yesterday.  She was getting up to go to the bathroom when she felt like she was tilting.   and patient disagree whether she was tilting towards the right or left, unclear if direction changed during the course of the day.  Required the assistance of her  to make it to the bathroom.  Given inability to independently ambulate, was brought here by EMS.  Patient reports that the off-balance sensation occurs only when she is sitting up or standing up.  Does not feel like she has this sensation when she is laying down.  Was provided with fluid boluses and meclizine yesterday.  Reports today her dizziness has improved.  Still feels like she has transient dizziness when standing up today.  She was evaluated by PT earlier today who positioned her with head tilted in various ways while lying down without reproduction of her dizziness.    At baseline, patient with orthostatic symptoms aware that she must stand still after arising from a sitting position for some time before walking.  Per chart review, orthostatic vitals taken yesterday noon and today negative.    ROS  A 10-point ROS was performed as per HPI.     PMH  Past Medical History:   Diagnosis Date     Anemia      Cellulitis      Cirrhosis of liver (H) 2018     Heterozygous alpha 1-antitrypsin deficiency  (H)      High hepatic iron concentration determined by biopsy of liver      Liver cirrhosis secondary to ANGULO (H)      Liver transplanted (H) 2019     Lymphedema      Osteoarthritis      SBP (spontaneous bacterial peritonitis) (H) 2019 in [place]     Past Surgical History:   Procedure Laterality Date     BENCH LIVER N/A 2019    Procedure: BACKBENCH PREPARATION, LIVER;  Surgeon: Mandeep Alford MD;  Location: [facility] OR     COLONOSCOPY N/A 2018    Procedure: COLONOSCOPY;  colonoscopy;  Surgeon: Hugo Patel MD;  Location: [facility] OR     IR FEEDING TUBE PLACEMENT W MOHAN/MD  2019     IR FLUORO 0-1 HOUR  2019     IR LUMBAR PUNCTURE  2019     TRANSPLANT LIVER RECIPIENT  DONOR N/A 2019    Procedure: TRANSPLANT, LIVER, RECIPIENT,  DONOR;  Surgeon: Mandeep Alford MD;  Location: [facility] OR       Medications   Medications Prior to Admission   Medication Sig Dispense Refill Last Dose     acetaminophen-caffeine (EXCEDRIN TENSION HEADACHE) 500-65 MG TABS Take 1 tablet by mouth daily as needed (tension headache)   Past Month at Unknown time     ascorbic acid 500 MG TABS Take 1 tablet (500 mg) by mouth daily 30 tablet 0 10/4/2019 at AM     aspirin (ASA) 325 MG tablet Take 1 tablet (325 mg) by mouth daily 30 tablet 0 10/4/2019 at AM     Cholecalciferol (VITAMIN D-3) 1000 units CAPS Take 1,000 Units by mouth 2 times daily 60 capsule 0 10/4/2019 at AM     cycloSPORINE modified (GENERIC EQUIVALENT) 100 MG capsule Take 225 mg by mouth 2 times daily Patient has 25mg and 100mg capsules   10/4/2019 at 0600     dapsone (ACZONE) 100 MG tablet Take 1 tablet (100 mg) by mouth daily 30 tablet 0 10/4/2019 at AM     famotidine (PEPCID) 20 MG tablet Take 1 tablet (20 mg) by mouth daily 30 tablet 0 10/4/2019 at AM     levothyroxine (SYNTHROID/LEVOTHROID) 175 MCG tablet Take 1 tablet (175 mcg) by mouth daily 30 tablet 0 10/4/2019 at AM     magnesium oxide (MAG-OX) 400 MG tablet Take 1  "tablet (400 mg) by mouth daily 30 tablet 0 10/4/2019 at AM     mycophenolic acid (GENERIC EQUIVALENT) 360 MG EC tablet Take 720 mg by mouth 2 times daily   10/4/2019 at 0600     simethicone (MYLICON) 80 MG chewable tablet Take 1 tablet (80 mg) by mouth every 6 hours as needed for cramping   Past Week at Unknown time     valGANciclovir (VALCYTE) 450 MG tablet Take 1 tablet (450 mg) by mouth daily 30 tablet 0 10/4/2019 at AM     zinc sulfate (ZINCATE) 220 (50 Zn) MG capsule Take 1 capsule (220 mg) by mouth daily 30 capsule 0 10/4/2019 at AM       Allergies  Allergies   Allergen Reactions     Food      Fruits with cores and pits  Apples     Sulfa Drugs Unknown     Swollen joints     Furosemide Rash       Social History  Social History     Tobacco Use     Smoking status: Former Smoker     Last attempt to quit: 2011     Years since quittin.7     Smokeless tobacco: Never Used     Tobacco comment: weekend smoker    Substance Use Topics     Alcohol use: No       Family History    Family History   Problem Relation Age of Onset     Cirrhosis No family hx of      Liver Cancer No family hx of          Physical Examination   Vitals: /74 (BP Location: Left arm)   Pulse 79   Temp 98.1  F (36.7  C) (Oral)   Resp 16   Ht 1.549 m (5' 1\")   Wt 83.7 kg (184 lb 9.6 oz)   SpO2 96%   BMI 34.88 kg/m    General: Adult female patient, lying in bed, NAD  HEENT: NC/AT, no icterus, op pink and moist  Chest: non-labored on RA  Abdomen: Soft  Extremities: Warm, bilateral lower extremity edema  Skin: No rash or lesion   Psych: Affect pleasant  Neuro:  Mental status: Awake, alert, attentive, oriented to self, time, place, and circumstance. Language is fluent and coherent with intact comprehension of complex commands, naming and repetition.  Slightly slow to respond to questions (patient attributed this to sedating medications given during current hospitalization).  Cranial nerves: VFF, PERRL, conjugate gaze, EOMI, no skew, no " "nystagmus, head impulse test negative, facial sensation intact, face symmetric, shoulder shrug strong, tongue/uvula midline, no dysarthria.   Motor: Normal bulk. No asterixis or other abnormal movements. 5/5 strength in upper extremities with exception of 4/5 left shoulder abduction (limited by pain). 4/5 strength and symmetric hip flexion, knee flexion, foot dorsiflexion.   5/5 and symmetric knee extension, plantarflexion.  Patient reports lower extremity weakness is baseline for her.  Reflexes: 2+ reflexic and symmetric biceps, brachioradialis, triceps, patellae, and achilles. No clonus, toes down-going.  Sensory: Intact to light touch throughout  Coordination: FNF and HS without ataxia or dysmetria.   Gait: Deferred    Investigations   Recent Labs   Lab 10/05/19  0539 10/04/19  1130 10/03/19  0936   WBC 3.4* 4.3 3.7*   HGB 8.8* 10.1* 10.2*   * 110* 110*   * 150 173   INR 1.19*  --   --     138 136   POTASSIUM 4.1 4.2 4.4   CHLORIDE 106 100 98   CO2 29 33* 34*   BUN 26 34* 34*   CR 0.77 0.92 0.96   ANIONGAP 5 5 4   JACOB 8.8 9.6 10.0   GLC 73 91 83   ALBUMIN 3.0* 3.2* 3.6   PROTTOTAL 6.3* 6.9 7.3   BILITOTAL 1.6* 1.8* 1.9*   ALKPHOS 145 176* 188*   ALT 24 28 33   AST 24 23 24     CT head without contrast 10/5/19:  \"Impression: No acute intracranial pathology.\"     Impression  59-year-old woman with past medical history including liver transplant August 2019, alpha-1 antitrypsin deficiency, hypertension, hypothyroidism who presented due to vertigo when upright.  Neurology was consulted for vertigo.  Dizziness started yesterday, significantly improved today.  PT evaluation negative for dizziness induced by Shea-Hallpike maneuver, head impulse negative, no nystagmus or skew on exam.  Given the positional nature of the symptoms, possible this is orthostatic lightheadedness although orthostatic blood pressures negative yesterday and today.  Not likely peripheral vertigo given exam.  Possible this is " central vertigo: patient would benefit from MRI to evaluate for structural causes of central vertigo.    Recommendations  - MRI to evaluate for structural issues that may cause central vertigo  - neurology will follow for imaging results    Thank you for involving Neurology in the care of Nicci Pembertno.  Please do not hesitate to call with questions/concerns.    Patient was seen and discussed with Dr. Salinas.    Julia Willoughby MD  Neurology PGY-2  [phone]

## 2019-10-05 NOTE — PROGRESS NOTES
OBSERVATION GOALS:     1. Diagnostic tests and consults completed and resulted- Pending.  Neuro consult ordered this morning.  2. Vital signs normal or at patient baseline- Met.  O2 at 98% on RA.  3. Tolerating oral intake to maintain hydration- Met.  Ordered a late lunch- tolerated about 75% of meal with no complaints of nausea.

## 2019-10-05 NOTE — PLAN OF CARE
Observation Goals:      1.  Diagnostic tests/consults completed with results pending.  Neuro. Consulted.  Down for brain MRI this shift.  2.  VSS.  Sats well on ra when awake.  Requires small amt. O2 when sleeping.  3.  Tolerating PO intake.  Ordering food.  No nausea or emesis.  No BM so far this shift.

## 2019-10-05 NOTE — PLAN OF CARE
[id number]    Pt is A/Ox4 this AM, slowly becoming more alert throughout the shift. Requiring 2-3L NC overnight to stay above 89-91% SpO2. Regular diet. Denies pain or nausea, only reporting dizziness sitting or standing up. Up assist x1 and walker to bathroom. Bed alarm on for safety. Continue to monitor.

## 2019-10-05 NOTE — PLAN OF CARE
VSS on RA.  Was able to titrate down from 3 L of O2 this morning.  Alert and oriented x4- patient very lethargic this morning and had difficulty keeping eyes open but mentation and alertness has improved throughout the day.  Patient c/o generalized achy pain but declines any medication at this time.  Patient had 3 BM's today- 2 were loose and watery- consider sending stool for CDIFF if patient has another BM.  Voiding good amounts.  Up with assist of 1 and walker.  Patient states dizziness has improved from yesterday.   Bed alarm on for safety when  is not in the room.  Patient worked with PT and Neurology consulted.     Will continue to monitor and will notify MD of any changes or concerns.

## 2019-10-05 NOTE — PLAN OF CARE
OBSERVATION GOALS:    Diagnostic tests and consults completed and resulted- Pending.  Neuro consult ordered this morning.  Vital signs normal or at patient baseline- Met.  O2 at 98% on RA.  Tolerating oral intake to maintain hydration- Not Met.  Was lethargic this morning and has not had any breakfast.  Able to tolerate PO fluid this morning with no episodes of nausea or vomiting.

## 2019-10-05 NOTE — PROGRESS NOTES
10/05/19 1213   Quick Adds   Type of Visit Initial PT Evaluation   Living Environment   Lives With spouse   Living Arrangements house   Home Accessibility stairs to enter home;stairs within home   Number of Stairs, Main Entrance 4   Stair Railings, Main Entrance railings on both sides of stairs   Number of Stairs, Within Home, Primary 6  (split entry)   Stair Railings, Within Home, Primary railing on right side (ascending)   Transportation Anticipated family or friend will provide   Living Environment Comment Pt was recently discharged home from [facility], plans to have HHPT initiate once discharged.   Self-Care   Usual Activity Tolerance good   Current Activity Tolerance moderate   Regular Exercise Yes  (PT/OT at [facility], now HHPT)   Equipment Currently Used at Home walker, rolling   Activity/Exercise/Self-Care Comment Pt was at [facility] for 8 days following hospitalization for liver transplant, plans to begin HHPT once discharged from hospital.   Functional Level Prior   Ambulation 1-->assistive equipment   Transferring 1-->assistive equipment   Toileting 0-->independent   Bathing 0-->independent   Communication 0-->understands/communicates without difficulty   Swallowing 0-->swallows foods/liquids without difficulty   Cognition 0 - no cognition issues reported   Fall history within last six months no   Which of the above functional risks had a recent onset or change? ambulation;transferring   Prior Functional Level Comment Prior to liver transplant pt was independent with functional mobility and ADLs though declining 2/2 health status, since discharge from [facility] pt has been Wendy with mobility, receives some assist for heavier ADLs from spouse   General Information   Onset of Illness/Injury or Date of Surgery - Date 10/04/19   Referring Physician Mary Braxton PA-C   Patient/Family Goals Statement To feel better and return home   Pertinent History of Current Problem (include personal factors and/or comorbidities that  impact the POC) Per chart, 58 yo w/ history of ANGULO and heterozygous alpha-1 antitrypsin deficiency, HTN, and hypothyroidism. Underwent  donor liver transplant on 19. Had a prolonged hospital stay complicated by encephalopathy, recently discharge to home on 10/1/19. On 10/4/19, she developed vertigo and vomiting with standing and presented for evaluation.   Precautions/Limitations fall precautions   General Info Comments activity orders: up ad adelina   Cognitive Status Examination   Orientation orientation to person, place and time   Level of Consciousness alert   Follows Commands and Answers Questions 100% of the time   Personal Safety and Judgment intact   Memory intact   Cognitive Comment Slightly lethargic, pt and spouse relate to medications   Posture    Posture Forward head position;Protracted shoulders   Range of Motion (ROM)   ROM Comment B LE ROM WFL   Strength   Strength Comments B LE strength not formally assessed though grossly >3/5 per functional status   Bed Mobility   Bed Mobility Comments Josr supine<>sit with use of bed rail   Transfer Skills   Transfer Comments SBA and FWW sit<>stand   Gait   Gait Comments Not assessed   Balance   Balance Comments Good seated balance, SBA and B UE support on FWW for standing balance   Sensory Examination   Sensory Perception no deficits were identified   General Therapy Interventions   Planned Therapy Interventions balance training;gait training;strengthening;risk factor education;home program guidelines;progressive activity/exercise   Clinical Impression   Criteria for Skilled Therapeutic Intervention yes, treatment indicated   PT Diagnosis Impaired functional mobility   Influenced by the following impairments Dizziness   Functional limitations due to impairments Pt unable to tolerate prior activity level   Clinical Presentation Stable/Uncomplicated   Clinical Presentation Rationale PMH and clinical judgment   Clinical Decision Making (Complexity) Low  "complexity   Therapy Frequency 2x/week   Predicted Duration of Therapy Intervention (days/wks) 3 days   Anticipated Equipment Needs at Discharge   (Likely none)   Anticipated Discharge Disposition Home with Assist;Home with Home Therapy   Risk & Benefits of therapy have been explained Yes   Patient, Family & other staff in agreement with plan of care Yes   Clinical Impression Comments PT evetteal complete, will follow peripherally as pt currently observation status and with safe discharge plan in place.   [facility] [employer] \"6 Clicks\"   2016, Trustees of [facility], under license to [employer].  All rights reserved.   6 Clicks Short Forms Basic Mobility Inpatient Short Form   [facility] AM-PAC  \"6 Clicks\" V.2 Basic Mobility Inpatient Short Form   1. Turning from your back to your side while in a flat bed without using bedrails? 4 - None   2. Moving from lying on your back to sitting on the side of a flat bed without using bedrails? 4 - None   3. Moving to and from a bed to a chair (including a wheelchair)? 3 - A Little   4. Standing up from a chair using your arms (e.g., wheelchair, or bedside chair)? 3 - A Little   5. To walk in hospital room? 3 - A Little   6. Climbing 3-5 steps with a railing? 3 - A Little   Basic Mobility Raw Score (Score out of 24.Lower scores equate to lower levels of function) 20   Total Evaluation Time   Total Evaluation Time (Minutes) 10     "

## 2019-10-06 ENCOUNTER — DOCUMENTATION ONLY (OUTPATIENT)
Dept: CARE COORDINATION | Facility: CLINIC | Age: 59
End: 2019-10-06

## 2019-10-06 VITALS
TEMPERATURE: 97.6 F | OXYGEN SATURATION: 96 % | BODY MASS INDEX: 34.11 KG/M2 | SYSTOLIC BLOOD PRESSURE: 124 MMHG | WEIGHT: 180.7 LBS | DIASTOLIC BLOOD PRESSURE: 78 MMHG | HEART RATE: 82 BPM | HEIGHT: 61 IN | RESPIRATION RATE: 16 BRPM

## 2019-10-06 PROBLEM — H66.90 OTITIS MEDIA: Status: ACTIVE | Noted: 2019-10-06

## 2019-10-06 PROBLEM — R09.02 HYPOXIA: Status: ACTIVE | Noted: 2019-10-06

## 2019-10-06 LAB
ALBUMIN SERPL-MCNC: 2.8 G/DL (ref 3.4–5)
ALP SERPL-CCNC: 138 U/L (ref 40–150)
ALT SERPL W P-5'-P-CCNC: 22 U/L (ref 0–50)
ANION GAP SERPL CALCULATED.3IONS-SCNC: 6 MMOL/L (ref 3–14)
AST SERPL W P-5'-P-CCNC: 19 U/L (ref 0–45)
BILIRUB DIRECT SERPL-MCNC: 0.7 MG/DL (ref 0–0.2)
BILIRUB SERPL-MCNC: 1.5 MG/DL (ref 0.2–1.3)
BUN SERPL-MCNC: 24 MG/DL (ref 7–30)
CALCIUM SERPL-MCNC: 8.8 MG/DL (ref 8.5–10.1)
CHLORIDE SERPL-SCNC: 105 MMOL/L (ref 94–109)
CMV DNA SPEC NAA+PROBE-ACNC: NORMAL [IU]/ML
CMV DNA SPEC NAA+PROBE-LOG#: NORMAL {LOG_IU}/ML
CO2 SERPL-SCNC: 28 MMOL/L (ref 20–32)
CREAT SERPL-MCNC: 0.79 MG/DL (ref 0.52–1.04)
ERYTHROCYTE [DISTWIDTH] IN BLOOD BY AUTOMATED COUNT: 19.6 % (ref 10–15)
GFR SERPL CREATININE-BSD FRML MDRD: 81 ML/MIN/{1.73_M2}
GLUCOSE SERPL-MCNC: 92 MG/DL (ref 70–99)
HCT VFR BLD AUTO: 28.5 % (ref 35–47)
HGB BLD-MCNC: 8.7 G/DL (ref 11.7–15.7)
MAGNESIUM SERPL-MCNC: 1.6 MG/DL (ref 1.6–2.3)
MCH RBC QN AUTO: 34.1 PG (ref 26.5–33)
MCHC RBC AUTO-ENTMCNC: 30.5 G/DL (ref 31.5–36.5)
MCV RBC AUTO: 112 FL (ref 78–100)
PHOSPHATE SERPL-MCNC: 4.3 MG/DL (ref 2.5–4.5)
PLATELET # BLD AUTO: 133 10E9/L (ref 150–450)
POTASSIUM SERPL-SCNC: 4.1 MMOL/L (ref 3.4–5.3)
PROT SERPL-MCNC: 5.9 G/DL (ref 6.8–8.8)
RBC # BLD AUTO: 2.55 10E12/L (ref 3.8–5.2)
SODIUM SERPL-SCNC: 139 MMOL/L (ref 133–144)
SPECIMEN SOURCE: NORMAL
WBC # BLD AUTO: 3.6 10E9/L (ref 4–11)

## 2019-10-06 PROCEDURE — 80076 HEPATIC FUNCTION PANEL: CPT | Performed by: PHYSICIAN ASSISTANT

## 2019-10-06 PROCEDURE — G0378 HOSPITAL OBSERVATION PER HR: HCPCS

## 2019-10-06 PROCEDURE — 84100 ASSAY OF PHOSPHORUS: CPT | Performed by: PHYSICIAN ASSISTANT

## 2019-10-06 PROCEDURE — 85027 COMPLETE CBC AUTOMATED: CPT | Performed by: PHYSICIAN ASSISTANT

## 2019-10-06 PROCEDURE — 80048 BASIC METABOLIC PNL TOTAL CA: CPT | Performed by: PHYSICIAN ASSISTANT

## 2019-10-06 PROCEDURE — 25000131 ZZH RX MED GY IP 250 OP 636 PS 637: Performed by: PHYSICIAN ASSISTANT

## 2019-10-06 PROCEDURE — 83735 ASSAY OF MAGNESIUM: CPT | Performed by: PHYSICIAN ASSISTANT

## 2019-10-06 PROCEDURE — 36415 COLL VENOUS BLD VENIPUNCTURE: CPT | Performed by: PHYSICIAN ASSISTANT

## 2019-10-06 PROCEDURE — 25000132 ZZH RX MED GY IP 250 OP 250 PS 637: Performed by: NURSE PRACTITIONER

## 2019-10-06 PROCEDURE — 25000132 ZZH RX MED GY IP 250 OP 250 PS 637: Performed by: PHYSICIAN ASSISTANT

## 2019-10-06 RX ORDER — FLUORIDE TOOTHPASTE
5-10 TOOTHPASTE DENTAL 4 TIMES DAILY
Status: DISCONTINUED | OUTPATIENT
Start: 2019-10-06 | End: 2019-10-06 | Stop reason: HOSPADM

## 2019-10-06 RX ORDER — AMOXICILLIN 500 MG/1
500 CAPSULE ORAL EVERY 8 HOURS SCHEDULED
Status: DISCONTINUED | OUTPATIENT
Start: 2019-10-06 | End: 2019-10-06 | Stop reason: HOSPADM

## 2019-10-06 RX ORDER — AMOXICILLIN 500 MG/1
500 CAPSULE ORAL EVERY 8 HOURS
Qty: 21 CAPSULE | Refills: 0 | Status: SHIPPED | OUTPATIENT
Start: 2019-10-06 | End: 2019-10-09

## 2019-10-06 RX ORDER — MECLIZINE HCL 12.5 MG 12.5 MG/1
12.5 TABLET ORAL 3 TIMES DAILY PRN
Qty: 20 TABLET | Refills: 0 | Status: SHIPPED | OUTPATIENT
Start: 2019-10-06 | End: 2019-11-19

## 2019-10-06 RX ORDER — VANCOMYCIN HYDROCHLORIDE 125 MG/1
125 CAPSULE ORAL 2 TIMES DAILY
Qty: 20 CAPSULE | Refills: 0 | Status: SHIPPED | OUTPATIENT
Start: 2019-10-06 | End: 2019-10-29

## 2019-10-06 RX ADMIN — DAPSONE 100 MG: 100 TABLET ORAL at 09:15

## 2019-10-06 RX ADMIN — FAMOTIDINE 20 MG: 20 TABLET ORAL at 09:16

## 2019-10-06 RX ADMIN — ASPIRIN 325 MG ORAL TABLET 325 MG: 325 PILL ORAL at 09:16

## 2019-10-06 RX ADMIN — OXYCODONE HYDROCHLORIDE AND ACETAMINOPHEN 500 MG: 500 TABLET ORAL at 09:16

## 2019-10-06 RX ADMIN — MYCOPHENOLIC ACID 720 MG: 360 TABLET, DELAYED RELEASE ORAL at 09:16

## 2019-10-06 RX ADMIN — ZINC SULFATE CAP 220 MG (50 MG ELEMENTAL ZN) 220 MG: 220 (50 ZN) CAP at 09:16

## 2019-10-06 RX ADMIN — AMOXICILLIN 500 MG: 500 CAPSULE ORAL at 13:52

## 2019-10-06 RX ADMIN — MAGNESIUM OXIDE TAB 400 MG (241.3 MG ELEMENTAL MG) 400 MG: 400 (241.3 MG) TAB at 09:16

## 2019-10-06 RX ADMIN — VALGANCICLOVIR 450 MG: 450 TABLET, FILM COATED ORAL at 09:16

## 2019-10-06 RX ADMIN — CYCLOSPORINE 225 MG: 100 CAPSULE, LIQUID FILLED ORAL at 06:03

## 2019-10-06 RX ADMIN — MELATONIN 1000 UNITS: at 09:16

## 2019-10-06 RX ADMIN — LEVOTHYROXINE SODIUM 175 MCG: 100 TABLET ORAL at 09:16

## 2019-10-06 ASSESSMENT — MIFFLIN-ST. JEOR: SCORE: 1332.03

## 2019-10-06 NOTE — PROGRESS NOTES
Observation Goals:       1.  Diagnostic tests/consults completed with results pending.  Awaiting results.    2.  VSS. Afebrile.    3.  Tolerating PO intake.  Eats and drinks well.  No nausea or emesis.

## 2019-10-06 NOTE — PROGRESS NOTES
"Transplant Surgery  Inpatient Daily Progress Note  10/06/2019    Assessment & Plan:  Discharge today    __________________________________________________________________  Transplant History:    8/18/2019 (Liver), Postoperative day: 49     Interval History: History is obtained from the patient  Feels much better. Vertigo greatly improved. Able to ambulate with walker without difficulty. C/o mild pain in right neck. Reactive superficial cervical lymph node noted on exam.    ROS:   A 10-point review of systems was negative except as noted above.    Curent Meds:    amoxicillin  500 mg Oral Q8H HATTIE     artificial saliva  5-10 mL Swish & Spit 4x Daily     aspirin  325 mg Oral Daily     cycloSPORINE modified  225 mg Oral BID     dapsone  100 mg Oral Daily     famotidine  20 mg Oral Daily     influenza vaccine adult (product based on age)  0.5 mL Intramuscular Prior to discharge     levothyroxine  175 mcg Oral Daily     magnesium oxide  400 mg Oral Daily     melatonin  3 mg Oral At Bedtime     mycophenolic acid  720 mg Oral BID     sodium chloride (PF)  3 mL Intracatheter Q8H     valGANciclovir  450 mg Oral Daily     vitamin C  500 mg Oral Daily     vitamin D3  1,000 Units Oral BID     zinc sulfate  220 mg Oral Daily       Physical Exam:     Admit Weight: 83.5 kg (184 lb)    Current Vitals:   /78 (BP Location: Left arm)   Pulse 82   Temp 97.6  F (36.4  C) (Oral)   Resp 16   Ht 1.549 m (5' 1\")   Wt 82 kg (180 lb 11.2 oz)   SpO2 96%   BMI 34.14 kg/m      Vital sign ranges:    Temp:  [97.6  F (36.4  C)-98.4  F (36.9  C)] 97.6  F (36.4  C)  Pulse:  [82-88] 82  Heart Rate:  [80-84] 82  Resp:  [16-17] 16  BP: (107-125)/(69-84) 124/78  SpO2:  [91 %-97 %] 96 %    General Appearance: in no apparent distress.   Skin: normal, warm, dry  Heart: RRR  Lungs: CTA, slightly diminished  Abdomen: Soft  : No durbin  Extremities: edema: trace BLE  Neurologic: A&O x4, strength 5/5    Frailty Scores     There is no flowsheet data " to display.          Data:   CMP  Recent Labs   Lab 10/06/19  0653 10/05/19  0539    141   POTASSIUM 4.1 4.1   CHLORIDE 105 106   CO2 28 29   GLC 92 73   BUN 24 26   CR 0.79 0.77   GFRESTIMATED 81 84   GFRESTBLACK >90 >90   JACOB 8.8 8.8   MAG 1.6 1.6   PHOS 4.3 4.1   ALBUMIN 2.8* 3.0*   BILITOTAL 1.5* 1.6*   ALKPHOS 138 145   AST 19 24   ALT 22 24     CBC  Recent Labs   Lab 10/06/19  0653 10/05/19  0539   HGB 8.7* 8.8*   WBC 3.6* 3.4*   * 126*       Attestation:    The patient has been seen and evaluated by me.   Vital signs, labs, medications and orders were reviewed.   When obtained, diagnostic images were reviewed by me and interpreted as above.    The care plan was discussed with the multidisciplinary team and I agree with the findings and plan in this note, with any differences recorded in blue.    Immunosuppressive medication management was reviewed and adjusted as reflected in the note and orders.     .

## 2019-10-06 NOTE — PLAN OF CARE
"VS: /74 (BP Location: Right arm)   Pulse 88   Temp 98.3  F (36.8  C) (Oral)   Resp 16   Ht 1.549 m (5' 1\")   Wt 82 kg (180 lb 11.2 oz)   SpO2 97%   BMI 34.14 kg/m      Current condition: Stable  Neuro: Forgetful AOx3  Cardio: WDL  Respiratory: WDL  GI/: WDL  Diet: Regular  Skin: WDL  LDA:  RPIV  Education: Call light use     Plan of Care:  Will Continue to monitor  "

## 2019-10-06 NOTE — DISCHARGE SUMMARY
[facility], [place]    Discharge Summary  Transplant Surgery    Date of Admission:  10/4/2019  Date of Discharge:  10/6/2019  Discharging Provider: Nghia Amaral MD  / Sharon Merchant NP     Discharge Diagnoses   Active Problems:    Vertigo    Otitis media    Hypoxia      History of Present Illness   Nicci Pemberton is an 59 year old female w/ history of ANGULO and heterozygous alpha-1 antitrypsin deficiency, HTN, and hypothyroidism. Underwent  donor liver transplant on 19. Had a prolonged hospital stay complicated by encephalopathy, recently discharge to home on 10/1/19. On 10/4/19, she developed vertigo and vomiting with standing and presented for evaluation.    Hospital Course   Vertigo: Onset 10/4 with associated vomiting. 10/4 Head CT negative. Asymptomatic at rest. She did have exam finding of some dried blood in right ear, but TM was intact and without bulging or sign of obvious infection. Neurology was consulted, and they recommended a MRI brain. MRI was read as concerning for MARIA ISABEL, however, Neurology reviewed and found it was consistent with her previous MRI on 9/3/19. They did not feel that findings were contributing to her vertigo, and they did not recommend discontinuation of cyclosporine. Symptoms had improved significantly by day of discharge. Referral made for vestibular rehab.    Presumed otitis media, right: Patient will be treated for otitis media based on vertigo, dried blood in right ear, and reactive superficial cervical lymph node. Amoxicillin x7 days. PO vanco BID x10 days for prophylaxis due to recent C. Diff infection.    Nocturnal hypoxia: Requiring a small amount of oxygen at night. This issue was noted in TCU as well. Referral made for sleep study.    Liver transplant: LFTs stable. Total bili 1.5, trending down.  CSA: Level goal 200-300.   Myfortic: 720mg BID    Significant Results and Procedures   10/5/19 MRI brain:  1. Brain findings are nonspecific,  but given increasing deep and  subcortical white matter involvement symmetrically, this appearance is  now suggestive of acute toxic leukoencephalopathy (MARIA ISABEL), which can  overlap with both uremic and hepatic cephalopathy. Consider  correlation with the clinical history if there are exposure to toxic  chemotherapeutic or immunosuppressive agents, ser umammonia level, and  serum blood urea nitrogen level.  2. T1 hyperintense globi pallidi suggestive of underlying findings of  chronic hepatic encephalopathy and manganese deposition.  3. Head MRA demonstrates no definite aneurysm or stenosis of the major  intracranial arteries.  4. Neck MRA demonstrates patent major cervical arteries.    Pending Results   Unresulted Labs Ordered in the Past 30 Days of this Admission     Date and Time Order Name Status Description    10/4/2019 2330 EBV DNA PCR Quantitative Whole Blood In process     9/11/2019 0614 T4 free In process     8/23/2019 0544 Platelets prepare order unit In process     8/20/2019 0358 Cryoprecipitate prepare order unit In process           Code Status   Full    Primary Care Physician   Wale Thurston    Time Spent on this Encounter   I, Sharon Merchant, SANTINO LANDAVERDE, personally saw the patient today and spent greater than 30 minutes discharging this patient.    Discharge Disposition   Discharged to home  Condition at discharge: Stable    Consultations This Hospital Stay   MEDICATION HISTORY IP PHARMACY CONSULT  PHYSICAL THERAPY ADULT IP CONSULT  NEUROLOGY GENERAL ADULT IP CONSULT    Discharge Orders      PHYSICAL THERAPY REFERRAL      SLEEP EVALUATION & MANAGEMENT REFERRAL - [facility] [place] [phone] (Age 15 and up)      Reason for your hospital stay    Vertigo, dehydration     Adult [facility] Follow-up and recommended labs and tests    Dr. Alford, Transplant Clinic, 10/7  PT referral for vestibular rehab    Appointments on [place] and/or [facility] (with [facility] or [facility]  provider or service). Call [phone] if you haven't heard regarding these appointments within 7 days of discharge.     Activity    Your activity upon discharge: activity as tolerated     When to contact your care team    Notify your coordinator if you have pain over your liver, fever greater than 100.5F, watery diarrhea (this can be a sign of a repeat C. Diff infection), increase in size or pain in the lymph node on the right size of your neck, or yellowing of skin or eyes.    Notify your coordinator immediately if you are ever unable to take your immunosuppressive medications for any reason.    Transplant Coordinator Zina [phone]     Diet    Follow this diet upon discharge: Regular, drink lots of water     Discharge Medications   Current Discharge Medication List      START taking these medications    Details   amoxicillin (AMOXIL) 500 MG capsule Take 1 capsule (500 mg) by mouth every 8 hours  Qty: 21 capsule, Refills: 0    Associated Diagnoses: Acute otitis media, unspecified otitis media type      meclizine (ANTIVERT) 12.5 MG tablet Take 1 tablet (12.5 mg) by mouth 3 times daily as needed for dizziness  Qty: 20 tablet, Refills: 0    Associated Diagnoses: Dizziness      sodium chloride (OCEAN) 0.65 % nasal spray Spray 1 spray into both nostrils every hour as needed for congestion  Qty: 1 Bottle, Refills: 0    Associated Diagnoses: Acute otitis media, unspecified otitis media type      vancomycin (VANCOCIN HCL) 125 MG capsule Take 1 capsule (125 mg) by mouth 2 times daily  Qty: 20 capsule, Refills: 0    Associated Diagnoses: C. difficile diarrhea         CONTINUE these medications which have NOT CHANGED    Details   acetaminophen-caffeine (EXCEDRIN TENSION HEADACHE) 500-65 MG TABS Take 1 tablet by mouth daily as needed (tension headache)    Associated Diagnoses: Tension headache      ascorbic acid 500 MG TABS Take 1 tablet (500 mg) by mouth daily  Qty: 30 tablet, Refills: 0    Associated Diagnoses:  Status post liver transplantation (H)      aspirin (ASA) 325 MG tablet Take 1 tablet (325 mg) by mouth daily  Qty: 30 tablet, Refills: 0    Associated Diagnoses: Status post liver transplantation (H)      Cholecalciferol (VITAMIN D-3) 1000 units CAPS Take 1,000 Units by mouth 2 times daily  Qty: 60 capsule, Refills: 0    Associated Diagnoses: Severe malnutrition (H)      cycloSPORINE modified (GENERIC EQUIVALENT) 100 MG capsule Take 225 mg by mouth 2 times daily Patient has 25mg and 100mg capsules      dapsone (ACZONE) 100 MG tablet Take 1 tablet (100 mg) by mouth daily  Qty: 30 tablet, Refills: 0    Associated Diagnoses: Status post liver transplantation (H)      famotidine (PEPCID) 20 MG tablet Take 1 tablet (20 mg) by mouth daily  Qty: 30 tablet, Refills: 0    Associated Diagnoses: Status post liver transplantation (H)      levothyroxine (SYNTHROID/LEVOTHROID) 175 MCG tablet Take 1 tablet (175 mcg) by mouth daily  Qty: 30 tablet, Refills: 0    Associated Diagnoses: Status post liver transplantation (H)      magnesium oxide (MAG-OX) 400 MG tablet Take 1 tablet (400 mg) by mouth daily  Qty: 30 tablet, Refills: 0    Associated Diagnoses: S/P liver transplant (H); Severe malnutrition (H)      mycophenolic acid (GENERIC EQUIVALENT) 360 MG EC tablet Take 720 mg by mouth 2 times daily      simethicone (MYLICON) 80 MG chewable tablet Take 1 tablet (80 mg) by mouth every 6 hours as needed for cramping    Associated Diagnoses: S/P liver transplant (H)      valGANciclovir (VALCYTE) 450 MG tablet Take 1 tablet (450 mg) by mouth daily  Qty: 30 tablet, Refills: 0    Associated Diagnoses: Status post liver transplantation (H)      zinc sulfate (ZINCATE) 220 (50 Zn) MG capsule Take 1 capsule (220 mg) by mouth daily  Qty: 30 capsule, Refills: 0    Associated Diagnoses: Status post liver transplantation (H)           Allergies   Allergies   Allergen Reactions     Food      Fruits with cores and pits  Apples     Sulfa Drugs Unknown      Swollen joints     Furosemide Rash     Data   Most Recent 3 CBC's:  Recent Labs   Lab Test 10/06/19  0653 10/05/19  0539 10/04/19  1130   WBC 3.6* 3.4* 4.3   HGB 8.7* 8.8* 10.1*   * 114* 110*   * 126* 150     Most Recent 3 BMP's:  Recent Labs   Lab Test 10/06/19  0653 10/05/19  0539 10/04/19  1130    141 138   POTASSIUM 4.1 4.1 4.2   CHLORIDE 105 106 100   CO2 28 29 33*   BUN 24 26 34*   CR 0.79 0.77 0.92   ANIONGAP 6 5 5   JACOB 8.8 8.8 9.6   GLC 92 73 91     Most Recent 2 LFT's:  Recent Labs   Lab Test 10/06/19  0653 10/05/19  0539   AST 19 24   ALT 22 24   ALKPHOS 138 145   BILITOTAL 1.5* 1.6*     Attestation:    The patient has been seen and evaluated by me.   Vital signs, labs, medications and orders were reviewed.   When obtained, diagnostic images were reviewed by me and interpreted as above.    The care plan was discussed with the multidisciplinary team and I agree with the findings and plan in this note, with any differences recorded in blue.    Immunosuppressive medication management was reviewed and adjusted as reflected in the note and orders.     .

## 2019-10-06 NOTE — PLAN OF CARE
DISCHARGE:  D: Patient with orders to discharge to home with clinic visit tomorrow.  I: Discharge instructions, medications & follow ups reviewed with patient and family. Copy of discharge summary given to patient. PIV removed. All belongings packed & sent with patient. Medications filled & picked up at discharge pharmacy.   A: Patient in stable condition. AVSS. Patient and family had no further questions regarding discharge instructions and medications. Patient transferred out by wheelchair & left with .  P: Plan for clinic tomorrow.

## 2019-10-06 NOTE — PLAN OF CARE
Physical Therapy Discharge Summary    Reason for therapy discharge:    Discharged to home with home therapy.    Progress towards therapy goal(s). See goals on Care Plan in [facility] electronic health record for goal details.  Goals partially met.  Barriers to achieving goals:   discharge from facility.    Therapy recommendation(s):    Continued therapy is recommended.  Rationale/Recommendations:  HHPT to progress strength, endurance, and wean off AD use. OP vestibular rehab if dizziness symptoms persist.

## 2019-10-07 ENCOUNTER — OFFICE VISIT (OUTPATIENT)
Dept: TRANSPLANT | Facility: CLINIC | Age: 59
End: 2019-10-07

## 2019-10-07 ENCOUNTER — OFFICE VISIT (OUTPATIENT)
Dept: INFUSION THERAPY | Facility: CLINIC | Age: 59
End: 2019-10-07
Attending: TRANSPLANT SURGERY
Payer: COMMERCIAL

## 2019-10-07 ENCOUNTER — OFFICE VISIT (OUTPATIENT)
Dept: PHARMACY | Facility: CLINIC | Age: 59
End: 2019-10-07
Payer: COMMERCIAL

## 2019-10-07 ENCOUNTER — ANCILLARY PROCEDURE (OUTPATIENT)
Dept: GENERAL RADIOLOGY | Facility: CLINIC | Age: 59
End: 2019-10-07
Attending: NURSE PRACTITIONER
Payer: COMMERCIAL

## 2019-10-07 VITALS — WEIGHT: 182.4 LBS | BODY MASS INDEX: 34.46 KG/M2 | TEMPERATURE: 97.8 F

## 2019-10-07 DIAGNOSIS — Z94.4 STATUS POST LIVER TRANSPLANTATION (H): Primary | Chronic | ICD-10-CM

## 2019-10-07 DIAGNOSIS — Z94.4 LIVER REPLACED BY TRANSPLANT (H): Primary | ICD-10-CM

## 2019-10-07 DIAGNOSIS — S89.92XA INJURY OF LEFT KNEE, INITIAL ENCOUNTER: ICD-10-CM

## 2019-10-07 DIAGNOSIS — S89.92XA INJURY OF LEFT KNEE, INITIAL ENCOUNTER: Primary | ICD-10-CM

## 2019-10-07 DIAGNOSIS — R60.9 EDEMA, UNSPECIFIED TYPE: ICD-10-CM

## 2019-10-07 DIAGNOSIS — Z94.4 STATUS POST LIVER TRANSPLANTATION (H): Primary | ICD-10-CM

## 2019-10-07 DIAGNOSIS — R42 VERTIGO: ICD-10-CM

## 2019-10-07 LAB
ALBUMIN SERPL-MCNC: 3.5 G/DL (ref 3.4–5)
ALP SERPL-CCNC: 173 U/L (ref 40–150)
ALT SERPL W P-5'-P-CCNC: 26 U/L (ref 0–50)
ANION GAP SERPL CALCULATED.3IONS-SCNC: 7 MMOL/L (ref 3–14)
AST SERPL W P-5'-P-CCNC: 42 U/L (ref 0–45)
BASOPHILS # BLD AUTO: 0 10E9/L (ref 0–0.2)
BASOPHILS NFR BLD AUTO: 0.7 %
BILIRUB DIRECT SERPL-MCNC: 0.8 MG/DL (ref 0–0.2)
BILIRUB SERPL-MCNC: 2.4 MG/DL (ref 0.2–1.3)
BUN SERPL-MCNC: 21 MG/DL (ref 7–30)
CALCIUM SERPL-MCNC: 9.8 MG/DL (ref 8.5–10.1)
CHLORIDE SERPL-SCNC: 102 MMOL/L (ref 94–109)
CO2 SERPL-SCNC: 28 MMOL/L (ref 20–32)
CREAT SERPL-MCNC: 0.81 MG/DL (ref 0.52–1.04)
CYCLOSPORINE BLD LC/MS/MS-MCNC: 306 UG/L (ref 50–400)
DIFFERENTIAL METHOD BLD: ABNORMAL
EBV DNA # SPEC NAA+PROBE: NORMAL {COPIES}/ML
EBV DNA SPEC NAA+PROBE-LOG#: NORMAL {LOG_COPIES}/ML
EOSINOPHIL # BLD AUTO: 0.2 10E9/L (ref 0–0.7)
EOSINOPHIL NFR BLD AUTO: 3.4 %
ERYTHROCYTE [DISTWIDTH] IN BLOOD BY AUTOMATED COUNT: 18.7 % (ref 10–15)
GFR SERPL CREATININE-BSD FRML MDRD: 80 ML/MIN/{1.73_M2}
GLUCOSE SERPL-MCNC: 92 MG/DL (ref 70–99)
HCT VFR BLD AUTO: 33.2 % (ref 35–47)
HGB BLD-MCNC: 10.7 G/DL (ref 11.7–15.7)
IMM GRANULOCYTES # BLD: 0 10E9/L (ref 0–0.4)
IMM GRANULOCYTES NFR BLD: 0.9 %
LYMPHOCYTES # BLD AUTO: 0.4 10E9/L (ref 0.8–5.3)
LYMPHOCYTES NFR BLD AUTO: 7.8 %
MAGNESIUM SERPL-MCNC: 1.7 MG/DL (ref 1.6–2.3)
MCH RBC QN AUTO: 35.1 PG (ref 26.5–33)
MCHC RBC AUTO-ENTMCNC: 32.2 G/DL (ref 31.5–36.5)
MCV RBC AUTO: 109 FL (ref 78–100)
MONOCYTES # BLD AUTO: 0.4 10E9/L (ref 0–1.3)
MONOCYTES NFR BLD AUTO: 8.7 %
NEUTROPHILS # BLD AUTO: 3.5 10E9/L (ref 1.6–8.3)
NEUTROPHILS NFR BLD AUTO: 78.5 %
NRBC # BLD AUTO: 0 10*3/UL
NRBC BLD AUTO-RTO: 0 /100
PHOSPHATE SERPL-MCNC: 4.3 MG/DL (ref 2.5–4.5)
PLATELET # BLD AUTO: 127 10E9/L (ref 150–450)
POTASSIUM SERPL-SCNC: 4.3 MMOL/L (ref 3.4–5.3)
PROT SERPL-MCNC: 7.4 G/DL (ref 6.8–8.8)
RBC # BLD AUTO: 3.05 10E12/L (ref 3.8–5.2)
SODIUM SERPL-SCNC: 136 MMOL/L (ref 133–144)
TME LAST DOSE: NORMAL H
WBC # BLD AUTO: 4.5 10E9/L (ref 4–11)

## 2019-10-07 PROCEDURE — 85025 COMPLETE CBC W/AUTO DIFF WBC: CPT | Performed by: TRANSPLANT SURGERY

## 2019-10-07 PROCEDURE — 99607 MTMS BY PHARM ADDL 15 MIN: CPT | Performed by: PHARMACIST

## 2019-10-07 PROCEDURE — G0463 HOSPITAL OUTPT CLINIC VISIT: HCPCS

## 2019-10-07 PROCEDURE — 80158 DRUG ASSAY CYCLOSPORINE: CPT | Performed by: TRANSPLANT SURGERY

## 2019-10-07 PROCEDURE — 83735 ASSAY OF MAGNESIUM: CPT | Performed by: TRANSPLANT SURGERY

## 2019-10-07 PROCEDURE — 80076 HEPATIC FUNCTION PANEL: CPT | Performed by: TRANSPLANT SURGERY

## 2019-10-07 PROCEDURE — 36415 COLL VENOUS BLD VENIPUNCTURE: CPT

## 2019-10-07 PROCEDURE — 97802 MEDICAL NUTRITION INDIV IN: CPT | Mod: XU

## 2019-10-07 PROCEDURE — 99605 MTMS BY PHARM NP 15 MIN: CPT | Performed by: PHARMACIST

## 2019-10-07 PROCEDURE — 80048 BASIC METABOLIC PNL TOTAL CA: CPT | Performed by: TRANSPLANT SURGERY

## 2019-10-07 PROCEDURE — 84100 ASSAY OF PHOSPHORUS: CPT | Performed by: TRANSPLANT SURGERY

## 2019-10-07 RX ORDER — BUMETANIDE 1 MG/1
1 TABLET ORAL EVERY OTHER DAY
Qty: 30 TABLET | Refills: 0 | Status: SHIPPED | OUTPATIENT
Start: 2019-10-07 | End: 2019-10-29

## 2019-10-07 NOTE — PROGRESS NOTES
Transplant Social Work Services Progress Note      Collaborated with: Liver Transplant Team    Data: Nicci is s/p  donor Liver Transplant and she returns to [facility] for routine follow up.  She received a liver transplant on 19.  Her  Hugo accompanied her to [facility] today.  Intervention/Education: I met with Nicci and her husbad Hugo and we reviewed the followin. Writing to the Donor Family process        2. Liver Transplant Support Group and social events-Nicci's email will be added to our distribution list.        3. Immunosuppression copays-no current concerns.  I reminded patient/ to contact me if he has future concerns and we can evaluate for financial assistance programs, grants, etc.        4. Adjustment to illness-Nicci was tearful during parts of her appointments today.  She had a fall at home resulting in a knee injury.  She is discouraged. I provided adjustment to illness counseling and focused on validation and encouragement.        5. Importance of maintaining abstinence from all non-prescribed drugs and alcohol.        6. Education about applying for SSDI-Nicci has been out of work since sometime in May 2019.  She quit working due to her health decline.    Assessment: Nicci has had a difficult course post liver transplantation.  She discharged from acute rehab recently and was readmitted to the hospital shortly after.  She has strong support from her  and family.  Her  is coping appropriately, and is patient with Nicci's recovery.  He can see she is making progress, and he remains hopeful about her recovery.  Plan: I will remain available for the psychosocial needs of this patient.         ARIEL Valerio, [facility]  Liver Transplant   Phone [phone]  Pager [phone]

## 2019-10-07 NOTE — PROGRESS NOTES
"Nicci Pemberton came to [facility] today for a lab and assess following a liver transplant on 8/18/19.      Discharge date: initially discharged on 9/23/19, readmitted x 2 and discharged most recently on 10/6/19  Transplant coordinator: Zina Dunham RN  Phone number patient can be reached at: [phone], [phone]      Physical Assessment:  See physical assessment located under \"Document Flowsheets\".  Incision site: C/D/I, healed  Lines: NA  Forrester: NA  Urine clarity: clear per patient  Hydration: not able to drink as much as she'd like. Drinking supplement drinks and small amounts of water.  Nutrition: Patient reports getting full after taking 1-2 bites of food. Poor appetite. Patient is only able to drink supplement drinks and bites of food. Patient seen by Dietician today for helpful tips and options.  Last BM: today, loose---reports 1 stool per day. Positive CDiff mid-September of this year.  Pain: 4/10 abdominal--not incisional, also knee pain due to falling this morning from dizziness upon waking up.  Vertigo has been an ongoing issue since last week. Patient has fallen and X ray done on left knee today. Vertigo has inhibited patient from ambulating due to poor balance. Patient also feels weak because she has not been able to move around. Lymphedema noted to both lower legs. Legs wrapped by Virgie Heart NP today and patient will follow up with Lymphedema clinic on Friday. Zina RICHARDS, patient's transplant coordinator, will set up appointments for patient to be seen by ENT and neurology regarding vertigo.    Labs drawn by [facility] staff Yes    Plan of care for today: Labs drawn and copy of results discussed and given to patient. Medications reviewed and medication card updated. , Keny, helps patient post transplant and filled medication box for her.    Patient seen by the following Transplant Team Members:  -Nawaf Browne, Specialty Pharmacist  -Zina Dunham RN transplant coordinator  -Dr." Rocío  -Virgie Heart, NP Transplant Surgery  -Genet Zhang, Dietician  -Tracy Almeida,     Medication changes:   Start taking Bumex 1 mg every other day for lymphedema    Medications administered:  none    Patient education:    The following teaching topics were addressed: Importance of drinking 2L of non-caffeinated fluids daily, Incisional care, Signs/symptoms of infection, Good handwashing, Medications (purposes, doses and times of administration), Phone numbers to call with concenrs (Transplant coordinator, [address] and [facility]), 7A discharge check list and Plan of care   Patient verbalized understanding and all questions answered.    Face to face time: 90 minutes  Discharge Plan    Pt will follow up with Transplant Clinic next week, lymphedema RN, ENT, and neurology  Discharge instructions reviewed with patient: YES  Patient/Representative verbalized understanding, all questions answered: YES    Discharged from unit at 1315 with whom:  to home.    Jenelle Seo, RN, RN

## 2019-10-07 NOTE — PATIENT INSTRUCTIONS
Recommendations from today's MTM visit:                                                      1. You can take your Amoxicillin with your other medications, just get it in 3 times daily.    2. Separate magnesium 2 hours from your mycophenolate acid.     It was great to speak with you today. I value your experience and would be very thankful for your time with providing feedback on our clinic survey. You may receive a survey via email or text message in the next few days.     Next MTM visit: 3 months post transplant.     To schedule another MTM appointment, please call the clinic directly or you may call the MTM scheduling line at [phone] or toll-free at [phone].     My Clinical Pharmacist's contact information:                                                      It was a pleasure talking with you today!  Please feel free to contact me with any questions or concerns you have.      Nawaf Browne, PharmD  MTM Pharmacist    Phone: [phone]

## 2019-10-07 NOTE — PROGRESS NOTES
Outpatient MNT: Post Liver Transplant    Time Spent: 15 minutes  Visit Type: F/U (last seen by OP RD 4/2018 for txp eval)  Referring Physician: Rocío  Pt accompanied by: her      Medical dx associated with RD referral  - s/p liver txp 8/18/19     Nutrition Assessment/Diet Recall  Pt reports poor appetite with early satiety (both liquids and solids), getting full after just a few sips of water. This has neither worsened nor improved since transplant. Her  believes she is eating 1/3 of her prior normal portions.     Yesterday she ate 1/3 of a 2 egg omelet with mushroom and sausage, no L (was discharging from the hospital) and 1/2 burger with a few bites of potato and tuna salad for dinner. Was advised to drink 4 Nepro/day. She was taking 4, but recently on 2/day. Drinking 40-60 oz water/day.     Kidney labs wnl.     Anthropometrics  Height:   61 in   BMI:    34.1    Weight Status:Obesity Grade I BMI 30-34.9   Weight:  180 lbs (10/6)            IBW (lb): 105  % IBW: 171    Adj/dosing BW: 124 lbs/56 kg       Labs  Potassium   Date Value Ref Range Status   10/07/2019 4.3 3.4 - 5.3 mmol/L Final     Comment:     Specimen slightly hemolyzed, potassium may be falsely elevated       Estimated Nutrition Needs  Energy  1665-7186+     (25-30+ kcal/kg dosing BW for maintenance needs)       Protein  56-67    (1-1.2 g/kg for increased needs post txp)           Fluid  1 ml/kcal or per MD     Nutrition Diagnosis  Predicted suboptimal protein intake related to increased protein needs s/p transplant.    Nutrition Intervention  1. Discussed importance of consuming adequate calories and protein for 2 months post-txp to help heal and reduce muscle/fat wasting. Discussed sources of protein and ways to ensure she is increasing her protein intake.  -- Pt had just ordered another case of Nepro for at home use. She has ~20 left. Would aim for 2/day, more as able. After these are out, ok for pt to purchase products from her  "local grocery store or pharmacy, and to continue drinking as needed.   -- Discussed early satiety and to think of \"meals\" as small snacks and to graze on just a few bites of food, throughout the day often.     2. Did discuss potential for wt gain post-txp and after 2 months of eating higher calorie/higher protein foods, to return to baseline eating habits and monitor for any weight gains.     3. Reviewed importance of food safety and increased risk for food-borne illness post-txp. Also discussed potential need to monitor K+, CHO, and Na+ intakes d/t medication side effects.     4. Avoid the following post txp d/t risk for rejection, unknown effects on the organs, and/or potential interactions with immunosuppressants:  - Herbal, Chinese, holistic, chiropractic, natural, alternative medicines and supplements  - Detoxes and cleanses  - Weight loss pills  - Protein powders or other products with extracts or herbs (ie green tea extract)      Patient Understanding: Pt verbalized understanding of education provided.  Expected Compliance: Good  Follow-Up Plans: PRN     Nutrition Goals  1. Continue with 2 ONS/day or more  2. Pt to verbalize understanding of post txp nutrition ed provided     Provided pt with contact info.   Genet Zhang RD, LD  [facility] [phone]                            "

## 2019-10-07 NOTE — PROGRESS NOTES
"SUBJECTIVE/OBJECTIVE:                Nicci Pemberton is a 59 year old female coming in for a transitions of care visit.  She was discharged from [facility] on 10/6 for Liver txp.     Chief Complaint: Initial post txp med review.    Allergies/ADRs: Reviewed in Epic  Tobacco: No tobacco use   Alcohol: not currently using  Caffeine: no caffeine  PMH: Reviewed in Epic    Medication Adherence/Access:  Patient uses pill box(es) and has assistance with medication administration: family member, Hugo.   Patient takes medications 2 time(s) per day.   Per patient, misses medication 0 times per week.   Medication barriers: none.   The patient fills medications at [place]: YES.  Refills: discussed  Pt seen in [facility] today: yes  Patient seen in hospital by [facility]: yes  Patient receive supplies: yes  Stools: loose, 1bm daily, not formed, not watery. \"Little pieces\". Hx of CDIF  Reviewed Anti-rejection doses with bottles: yes    Liver Transplant:  Current immunosuppressants include CSA 225mg BID and Myforitic 720mg BID.  Pt reports Nausea  Aspirin 325mg daily, denies sx of bleeds, but does bruise easily.  Transplant date: 8/18/19  Estimated Creatinine Clearance: 74.4 mL/min (based on SCr of 0.79 mg/dL).  CMV prophylaxis: Valcyte 450 mg every day Treat 3 months post tx   PCP prophylaxis: Dapsone 100mg daily for 6 months post txp  PPI use: Famotidine 20mg daily, no heartburn symptoms  Current supplements for electrolyte replacement: Mag Oxide 400mg daily (not  2 hours from MMF) .  Tx Coordinator: Zina, Using Med Card: Yes,  Recent Infections:  Amoxil 500mg TID- taking this not with other medications, trying to take exactly 8 hours apart. CDIF txp Vanco 125mg BID.   Immunizations: annual flu shot 2018; [id number]:  2005, 2011; Prevnar 13: unknown; TDaP:  2018; Shingrix: Zostvax 2016, unknown for shingrix.     Nausea/ Vertigo: pt is taking Meclizine 12.5mg prn, has taken one since yesterday afternoon. No emesis since being home. " Had a fall this morning, hurt her knee. Dizziness upon changing positions.  Unclear if Meclizine is helping. No BP meds  BP Readings from Last 3 Encounters:   10/06/19 124/78   10/01/19 109/65   09/23/19 109/68     Today's Vitals: There were no vitals taken for this visit.    ASSESSMENT:                 Current medications were reviewed today.      Medication Adherence: good, no issues identified    Liver Transplant:  Pt may take Amoxicillin with other meds, just dose 3x daily. Instructed pt to separate magnesium 2 hours for Myfortic due to absorption interaction.     Nausea/ Vertigo: Stable    PLAN:                Post Discharge Medication Reconciliation Status: discharge medications reconciled and changed, per note/orders (see AVS).    Pt to...  1. Separate Mag 2 hours from Myfortic.  2. Reduce dosing frequency by combining Amoxicillin and other medication doses     I spent 45 minutes with this patient today. I offer these suggestions for consideration by txp team. A copy of the visit note was provided to the patient's referring provider.    Will follow up in 2-3 months.    The patient was given a summary of these recommendations as an after visit summary.    Nawaf Browne, PharmD  [facility] Pharmacist    Phone: [phone]

## 2019-10-07 NOTE — PROGRESS NOTES
Transplant Surgery -OUTPATIENT IMMUNOSUPPRESSION PROGRESS NOTE    Date of Visit: 10/07/2019  Immunosuppression Note:    Nicci Pemberton is a 59 year old female who is seen today  for immunosuppression management     I, Mandeep Alford MD, I have examined the patient with the resident/PA/Fellow, discussed and agree with the note and findings.  I have reviewed today's vital signs, medications, labs and imaging. I reviewed the immunosuppression medications and levels. I spoke to the patient/family and explained below clinical details and answered all the questions        Transplants:  2019 (Liver); Postoperative day:  50  ASSESMENT AND PLAN:  1.Graft Function: hepatic panel elevated. Monitor with next lab draw  2.Immunosuppression Management: CSA 225mg BID (Goal 200-300) Myfortic 720mg BID   3.Hypertension: stable  4.Renal Function:cr is   5.Lab frequency: twice weekly  6.Other:vertigo: See ENT and Neuro.  Neuro will review MRI  7.  Hematoma left knee: Knee xray  8. Edema: Bumex 1mg every other day.       Virgie Heart CNP      Date: 2019    Transplant:  [x]                             Liver [x]                              Kidney []                             Pancreas []                              Other:             Chief Complaint:No chief complaint on file.    History of Present Illness:Nicci Pemberton is an 59 year old female w/ history of ANGULO and heterozygous alpha-1 antitrypsin deficiency, HTN, and hypothyroidism. Underwent  donor liver transplant on 19. Had a prolonged hospital stay complicated by encephalopathy, recently discharge to home on 10/1/19. On 10/4/19, she developed vertigo and vomiting with standing and presented for evaluation.  Discharged 10/6/2019.        Patient Active Problem List   Diagnosis     Cirrhosis of liver (H)     Liver cirrhosis secondary to ANGULO (H)     Heterozygous alpha 1-antitrypsin deficiency (H)     High hepatic iron concentration determined  by biopsy of liver     SBP (spontaneous bacterial peritonitis) (H)     Acute kidney failure, unspecified (H)     Status post liver transplantation (H)     Epistaxis     Anemia due to blood loss, acute     C. difficile diarrhea     Metabolic encephalopathy     GAMAL (acute kidney injury) (H)     Steroid-induced hyperglycemia     Immunosuppressed status (H)     Hyperkalemia     Severe malnutrition (H)     S/P liver transplant (H)     Vertigo     Otitis media     Hypoxia     SOCIAL /FAMILY HISTORY: [x]                  No recent change    Past Medical History:   Diagnosis Date     Anemia      Cellulitis      Cirrhosis of liver (H) 2018     Heterozygous alpha 1-antitrypsin deficiency (H)      High hepatic iron concentration determined by biopsy of liver      Liver cirrhosis secondary to ANGULO (H)      Liver transplanted (H) 2019     Lymphedema      Osteoarthritis      SBP (spontaneous bacterial peritonitis) (H) 2019 in [place]     Past Surgical History:   Procedure Laterality Date     BENCH LIVER N/A 2019    Procedure: BACKBENCH PREPARATION, LIVER;  Surgeon: Mandeep Alford MD;  Location: [facility] OR     COLONOSCOPY N/A 2018    Procedure: COLONOSCOPY;  colonoscopy;  Surgeon: Hugo Patel MD;  [facility]     IR FEEDING TUBE PLACEMENT W MOHAN/MD  2019     IR FLUORO 0-1 HOUR  2019     IR LUMBAR PUNCTURE  2019     TRANSPLANT LIVER RECIPIENT  DONOR N/A 2019    Procedure: TRANSPLANT, LIVER, RECIPIENT,  DONOR;  Surgeon: Mandeep Alford MD;  Location: [facility] OR     Social History     Socioeconomic History     Marital status:      Spouse name: Not on file     Number of children: Not on file     Years of education: Not on file     Highest education level: Not on file   Occupational History     Not on file   Social Needs     Financial resource strain: Not on file     Food insecurity:     Worry: Not on file     Inability: Not on file     Transportation  needs:     Medical: Not on file     Non-medical: Not on file   Tobacco Use     Smoking status: Former Smoker     Last attempt to quit: 2011     Years since quittin.7     Smokeless tobacco: Never Used     Tobacco comment: weekend smoker    Substance and Sexual Activity     Alcohol use: No     Drug use: No     Sexual activity: Not on file   Lifestyle     Physical activity:     Days per week: Not on file     Minutes per session: Not on file     Stress: Not on file   Relationships     Social connections:     Talks on phone: Not on file     Gets together: Not on file     Attends Spiritism service: Not on file     Active member of club or organization: Not on file     Attends meetings of clubs or organizations: Not on file     Relationship status: Not on file     Intimate partner violence:     Fear of current or ex partner: Not on file     Emotionally abused: Not on file     Physically abused: Not on file     Forced sexual activity: Not on file   Other Topics Concern     Parent/sibling w/ CABG, MI or angioplasty before 65F 55M? Not Asked   Social History Narrative     Not on file     Prescription Medications as of 10/7/2019       Rx Number Disp Refills Start End Last Dispensed Date Next Fill Date Owning Pharmacy    acetaminophen-caffeine (EXCEDRIN TENSION HEADACHE) 500-65 MG TABS    2019        Sig: Take 1 tablet by mouth daily as needed (tension headache)    Class: OTC    Route: Oral    amoxicillin (AMOXIL) 500 MG capsule  21 capsule 0 10/6/2019    [facility] [place], [place] - [address]    Sig: Take 1 capsule (500 mg) by mouth every 8 hours    Class: E-Prescribe    Route: Oral    ascorbic acid 500 MG TABS  30 tablet 0 2019    [place], [place] - [address]    Sig: Take 1 tablet (500 mg) by mouth daily    Class: E-Prescribe    Route: Oral    aspirin (ASA) 325 MG tablet  30 tablet 0 2019    [place], [place] - [address]    Sig: Take 1 tablet (325 mg) by mouth daily    Class: E-Prescribe    Route: Oral    Cholecalciferol (VITAMIN D-3) 1000 units CAPS  60 capsule 0 9/30/2019    [place] [address]    Sig: Take 1,000 Units by mouth 2 times daily    Class: E-Prescribe    Route: Oral    cycloSPORINE modified (GENERIC EQUIVALENT) 100 MG capsule            Sig: Take 225 mg by mouth 2 times daily Patient has 25mg and 100mg capsules    Class: Historical    Route: Oral    dapsone (ACZONE) 100 MG tablet  30 tablet 0 9/30/2019    [place] [address]    Sig: Take 1 tablet (100 mg) by mouth daily    Class: E-Prescribe    Route: Oral    famotidine (PEPCID) 20 MG tablet  30 tablet 0 9/30/2019    [place] [address]    Sig: Take 1 tablet (20 mg) by mouth daily    Class: E-Prescribe    Route: Oral    levothyroxine (SYNTHROID/LEVOTHROID) 175 MCG tablet  30 tablet 0 9/30/2019    [place] [address]    Sig: Take 1 tablet (175 mcg) by mouth daily    Class: E-Prescribe    Route: Oral    magnesium oxide (MAG-OX) 400 MG tablet  30 tablet 0 10/1/2019    [place] [address]    Sig: Take 1 tablet (400 mg) by mouth daily    Class: E-Prescribe    Route: Oral    meclizine (ANTIVERT) 12.5 MG tablet  20 tablet 0 10/6/2019    [place], [place] - [address] [facility]    Sig: Take 1 tablet (12.5 mg) by mouth 3 times daily as needed for dizziness    Class: E-Prescribe    Route: Oral    mycophenolic acid (GENERIC EQUIVALENT) 360 MG EC tablet            Sig: Take 720 mg by mouth 2 times daily    Class: Historical    Route: Oral    simethicone (MYLICON) 80 MG chewable tablet    9/30/2019        Sig: Take 1 tablet (80 mg) by mouth every 6 hours as needed for cramping    Class: OTC    Route: Oral    sodium chloride  (OCEAN) 0.65 % nasal spray  1 Bottle 0 10/6/2019    [place], [place] - [address] [facility]    Sig: [place] 1 spray into both nostrils every hour as needed for congestion    Class: E-Prescribe    Route: Both Nostrils    valGANciclovir (VALCYTE) 450 MG tablet  30 tablet 0 9/30/2019    [facility] [address]    Sig: Take 1 tablet (450 mg) by mouth daily    Class: E-Prescribe    Route: Oral    vancomycin (VANCOCIN HCL) 125 MG capsule  20 capsule 0 10/6/2019    [place], [place] - [address] [facility]    Sig: Take 1 capsule (125 mg) by mouth 2 times daily    Class: E-Prescribe    Route: Oral    zinc sulfate (ZINCATE) 220 (50 Zn) MG capsule  30 capsule 0 9/30/2019    [facility] [address]    Sig: Take 1 capsule (220 mg) by mouth daily    Class: E-Prescribe    Route: Oral        Food; Sulfa drugs; and Furosemide   REVIEW OF SYSTEMS (check box if normal)  [x]               GENERAL  [x]                 PULMONARY [x]                GENITOURINARY  [x]                CNS                 [x]                 CARDIAC  [x]                 ENDOCRINE  [x]                EARS,NOSE,THROAT [x]                 GASTROINTESTINAL [x]                 NEUROLOGIC    [x]                MUSCLOSKELTAL  [x]                  HEMATOLOGY      PHYSICAL EXAM (check box if normal)There were no vitals taken for this visit.        [x]            GENERAL:    [x]       EYES:  ICTERIC   []        YES  []                    NO  [x]           EXTREMITIES: Clubbing []       Y     [x]           N    [x]           EARS, NOSE, THROAT: Membranes Moist    YES   [x]                   NO []                  [x]           LUNGS:  CLEAR    YES       [x]                  NO    []                                [x]           SKIN: Jaundice           YES       []                  NO    [x]                   Rash: YES       []                   NO    [x]                                     [x]             HEART: Regular Rate          YES       [x]                  NO    []                   Incision Clean:  YES       [x]                  NO    []                                [x]                    ABDOMEN: Organomegaly YES       []                  NO    [x]                       [x]                    NEUROLOGICAL:  Nonfocal  YES       [x]                  NO    []                       [x]                    Hernia YES       []                  NO    [x]                   PSYCHIATRIC:  Appropriate  YES       [x]                  NO    []                       OTHER:                                                                                                   PAIN SCALE:: 3  HPI      ROS      Physical Exam

## 2019-10-07 NOTE — LETTER
10/7/2019       RE: Nicci Pemberton  [address] [facility] [address]     Dear Colleague,    Thank you for referring your patient, Nicci Pemberton, to the [facility] SOLID ORGAN TRANSPLANT at [facility]. Please see a copy of my visit note below.    Transplant Social Work Services Progress Note      Collaborated with: Liver Transplant Team    Data: Nicci is s/p  donor Liver Transplant and she returns to [facility] for routine follow up.  She received a liver transplant on 19.  Her  Hugo accompanied her to [facility] today.  Intervention/Education: I met with Nicci and her husbad Hugo and we reviewed the followin. Writing to the Donor Family process        2. Liver Transplant Support Group and social events-Nicci's email will be added to our distribution list.        3. Immunosuppression copays-no current concerns.  I reminded patient/ to contact me if he has future concerns and we can evaluate for financial assistance programs, grants, etc.        4. Adjustment to illness-Nicci was tearful during parts of her appointments today.  She had a fall at home resulting in a knee injury.  She is discouraged. I provided adjustment to illness counseling and focused on validation and encouragement.        5. Importance of maintaining abstinence from all non-prescribed drugs and alcohol.        6. Education about applying for SSDI-Nicci has been out of work since sometime in May 2019.  She quit working due to her health decline.    Assessment: Nicci has had a difficult course post liver transplantation.  She discharged from acute rehab recently and was readmitted to the hospital shortly after.  She has strong support from her  and family.  Her  is coping appropriately, and is patient with Nicci's recovery.  He can see she is making progress, and he remains hopeful about her recovery.  Plan: I will remain available for the psychosocial needs of  this patient.         ARIEL Valerio, [facility]  Liver Transplant   Phone [phone]  Pager [phone]    Again, thank you for allowing me to participate in the care of your patient.      Sincerely,    ARIEL Young

## 2019-10-09 ENCOUNTER — TELEPHONE (OUTPATIENT)
Dept: TRANSPLANT | Facility: CLINIC | Age: 59
End: 2019-10-09

## 2019-10-09 ENCOUNTER — OFFICE VISIT (OUTPATIENT)
Dept: OTOLARYNGOLOGY | Facility: CLINIC | Age: 59
End: 2019-10-09
Payer: COMMERCIAL

## 2019-10-09 ENCOUNTER — ANCILLARY PROCEDURE (OUTPATIENT)
Dept: CT IMAGING | Facility: CLINIC | Age: 59
End: 2019-10-09
Attending: NURSE PRACTITIONER
Payer: COMMERCIAL

## 2019-10-09 VITALS
BODY MASS INDEX: 33.99 KG/M2 | DIASTOLIC BLOOD PRESSURE: 80 MMHG | SYSTOLIC BLOOD PRESSURE: 118 MMHG | HEART RATE: 88 BPM | HEIGHT: 61 IN | WEIGHT: 180 LBS

## 2019-10-09 DIAGNOSIS — Z94.4 LIVER REPLACED BY TRANSPLANT (H): ICD-10-CM

## 2019-10-09 DIAGNOSIS — Z94.4 LIVER REPLACED BY TRANSPLANT (H): Primary | ICD-10-CM

## 2019-10-09 DIAGNOSIS — K11.20 SIALADENITIS: Primary | ICD-10-CM

## 2019-10-09 RX ORDER — IOPAMIDOL 755 MG/ML
100 INJECTION, SOLUTION INTRAVASCULAR ONCE
Status: COMPLETED | OUTPATIENT
Start: 2019-10-09 | End: 2019-10-09

## 2019-10-09 RX ADMIN — IOPAMIDOL 100 ML: 755 INJECTION, SOLUTION INTRAVASCULAR at 15:20

## 2019-10-09 ASSESSMENT — MIFFLIN-ST. JEOR: SCORE: 1328.85

## 2019-10-09 NOTE — DISCHARGE INSTRUCTIONS

## 2019-10-09 NOTE — TELEPHONE ENCOUNTER
Per Virgie,  patient to have CT with contrast today and ENT will see her after. [place] [facility]

## 2019-10-09 NOTE — TELEPHONE ENCOUNTER
Discussed with DR Alford. Pt to be evaluated by ENT today; if not go to ER or urgent care.   Pt denies fevers, night sweats or chills. Pt states has not had anymore dizzy episodes since Monday morning. Patient states that the lymph node is the size of a cherry tomato now and before it was smaller.   ROBERTO Garvin, was paging ENT clinic to determine if they would be able to see patient today.

## 2019-10-09 NOTE — LETTER
"10/9/2019       RE: Nicci Pemberton  [address] [facility] [address]     Dear Colleague,    Thank you for referring your patient, Nicci Pemberton, to the [facility] Please see a copy of my visit note below.    Dear Virgie Heart:    I had the pleasure of meeting Nicci Pemberton in consultation today at the [facility] at your request.     History of Present Illness:   Nicci Pemberton is a 59 year old woman who is referred urgently for evaluation of parotid mass. She has a history of a liver transplant 7 weeks ago. She was recently admitted to the hospital over the weekend and diagnosed with \"otitis media\" of the right ear. On review of notes and discussion with the patient, she had no signs of otitis media on exam other than blood in the canal. She was started on augmentin. She says about 4 days ago she developed pain and swelling in her right neck (tail of parotid) which has been progressive in nature. It is painful to touch.     She was recently discharged from rehab after her liver transplant. She is dealing with \"veritgo\" which she says is a dizzy sensation when she comes from laying to sitting or sitting to standing. She recently had C diff.        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, alpha 1 anti-trypsin deficiency, HTN, hypothyroidism    Past surgical history: liver transplant      MEDICATIONS:     Current Outpatient Medications   Medication Sig Dispense Refill     acetaminophen-caffeine (EXCEDRIN TENSION HEADACHE) 500-65 MG TABS Take 1 tablet by mouth daily as needed (tension headache)       amoxicillin (AMOXIL) 500 MG capsule Take 1 capsule (500 mg) by mouth every 8 hours 21 capsule 0     ascorbic acid 500 MG TABS Take 1 tablet (500 mg) by mouth daily 30 tablet 0     aspirin (ASA) 325 MG tablet Take 1 tablet (325 mg) by mouth daily 30 tablet 0     bumetanide " (BUMEX) 1 MG tablet Take 1 tablet (1 mg) by mouth every other day 30 tablet 0     Cholecalciferol (VITAMIN D-3) 1000 units CAPS Take 1,000 Units by mouth 2 times daily 60 capsule 0     cycloSPORINE modified (GENERIC EQUIVALENT) 100 MG capsule Take 225 mg by mouth 2 times daily Patient has 25mg and 100mg capsules       dapsone (ACZONE) 100 MG tablet Take 1 tablet (100 mg) by mouth daily 30 tablet 0     famotidine (PEPCID) 20 MG tablet Take 1 tablet (20 mg) by mouth daily 30 tablet 0     levothyroxine (SYNTHROID/LEVOTHROID) 175 MCG tablet Take 1 tablet (175 mcg) by mouth daily 30 tablet 0     magnesium oxide (MAG-OX) 400 MG tablet Take 1 tablet (400 mg) by mouth daily 30 tablet 0     meclizine (ANTIVERT) 12.5 MG tablet Take 1 tablet (12.5 mg) by mouth 3 times daily as needed for dizziness 20 tablet 0     mycophenolic acid (GENERIC EQUIVALENT) 360 MG EC tablet Take 720 mg by mouth 2 times daily       simethicone (MYLICON) 80 MG chewable tablet Take 1 tablet (80 mg) by mouth every 6 hours as needed for cramping (Patient not taking: Reported on 10/7/2019)       sodium chloride (OCEAN) 0.65 % nasal spray Spray 1 spray into both nostrils every hour as needed for congestion 1 Bottle 0     valGANciclovir (VALCYTE) 450 MG tablet Take 1 tablet (450 mg) by mouth daily 30 tablet 0     vancomycin (VANCOCIN HCL) 125 MG capsule Take 1 capsule (125 mg) by mouth 2 times daily 20 capsule 0     zinc sulfate (ZINCATE) 220 (50 Zn) MG capsule Take 1 capsule (220 mg) by mouth daily 30 capsule 0       ALLERGIES:    Allergies   Allergen Reactions     Food      Fruits with cores and pits  Apples     Sulfa Drugs Unknown     Swollen joints     Furosemide Rash       HABITS/SOCIAL HISTORY:     Former smoker    Social History     Socioeconomic History     Marital status:      Spouse name: Not on file     Number of children: Not on file     Years of education: Not on file     Highest education level: Not on file   Occupational History      Not on file   Social Needs     Financial resource strain: Not on file     Food insecurity:     Worry: Not on file     Inability: Not on file     Transportation needs:     Medical: Not on file     Non-medical: Not on file   Tobacco Use     Smoking status: Former Smoker     Last attempt to quit: 2011     Years since quittin.7     Smokeless tobacco: Never Used     Tobacco comment: weekend smoker    Substance and Sexual Activity     Alcohol use: No     Drug use: No     Sexual activity: Not on file   Lifestyle     Physical activity:     Days per week: Not on file     Minutes per session: Not on file     Stress: Not on file   Relationships     Social connections:     Talks on phone: Not on file     Gets together: Not on file     Attends Zoroastrianism service: Not on file     Active member of club or organization: Not on file     Attends meetings of clubs or organizations: Not on file     Relationship status: Not on file     Intimate partner violence:     Fear of current or ex partner: Not on file     Emotionally abused: Not on file     Physically abused: Not on file     Forced sexual activity: Not on file   Other Topics Concern     Parent/sibling w/ CABG, MI or angioplasty before 65F 55M? Not Asked   Social History Narrative     Not on file       PAST MEDICAL HISTORY:   Past Medical History:   Diagnosis Date     Anemia      Cellulitis      Cirrhosis of liver (H) 2018     Heterozygous alpha 1-antitrypsin deficiency (H)      High hepatic iron concentration determined by biopsy of liver      Liver cirrhosis secondary to ANGULO (H)      Liver transplanted (H) 2019     Lymphedema      Osteoarthritis      SBP (spontaneous bacterial peritonitis) (H) 2019 in         PAST SURGICAL HISTORY:   Past Surgical History:   Procedure Laterality Date     BENCH LIVER N/A 2019    Procedure: BACKBENCH PREPARATION, LIVER;  Surgeon: Mandeep Alford MD;  Location: [facility] OR     COLONOSCOPY N/A 2018     "Procedure: COLONOSCOPY;  colonoscopy;  Surgeon: Hugo Patel MD;  [facility]     IR FEEDING TUBE PLACEMENT W MOHAN/MD  2019     IR FLUORO 0-1 HOUR  2019     IR LUMBAR PUNCTURE  2019     TRANSPLANT LIVER RECIPIENT  DONOR N/A 2019    Procedure: TRANSPLANT, LIVER, RECIPIENT,  DONOR;  Surgeon: Mandeep Alford MD;  Location: [facility] OR       FAMILY HISTORY:    Family History   Problem Relation Age of Onset     Cirrhosis No family hx of      Liver Cancer No family hx of        REVIEW OF SYSTEMS:  12 point ROS was negative other than the symptoms noted above in the HPI.  Patient Supplied Answers to Review of Systems  No flowsheet data found.      PHYSICAL EXAMINATION:   /80   Pulse 88   Ht 1.549 m (5' 1\")   Wt 81.6 kg (180 lb)   BMI 34.01 kg/m     Appearance:   normal; NAD, age-appropriate appearance, well-developed, normal habitus   Communication:   normal; communicates verbally, normal voice quality   Head/Face:   inspection -  Normal; no scars or visible lesions   Palpation - tender along tail of right parotid   Salivary glands -  Palpable firmness, about 1.5 cm in size along right tail of parotid, no overlying skin erythema   Facial strength -  Normal and symmetric bilateral; H/B I/VI   Skin:  slightly jaundice appearing   Ocular Motility:  normal occular movements   Ears:  auricle (AD) -  normal  EAC (AD) -  Small amount of dried blood on the floor of the canal  TM (AD) -  Normal, no effusion  auricle (AS) -  normal  EAC (AS) -  Normal, small amount of dried blood in canal along floor  TM (AS) -  Normal, no effusion  Normal clinical speech reception   Nose:  Ext. inspection -  Normal   Oral Cavity:  lips -  Normal mucosa, oral competence, and stoma size   Age-appropriate dentition, healthy gingival mucosa   Hard palate, buccal, floor of mouth mucosa with xerostomia  Unable to express saliva from parotid duct on right   Tongue - normal movement, no lesions   Neck: " "No visible mass or asymmetry   Tender along tail of parotid  Normal range of motion   Lymphatic:  no abnormal nodes   Cardiovascular:  warm, pink, well-perfused extremities without swelling, tenderness, or edema   Respiratory:  Normal respiratory effort, no stridor   Neuro/Psych.:  mood/affect -  normal  mental status -  normal        RESULTS REVIEWED:   I independently reviewed the CT scan images - there is a questionable mass in the tail of the right parotid with hard to discern borders, on discussion with radiology could very well be a focal sialadenitis based on patient's history    IMPRESSION AND PLAN:   Nicci Pemberton is a 59 year old woman with a history of a recent liver transplant who has a 4 day history of neck swelling. She recently was diagnosed with \"otitis media\". I discussed with her today that I do not think she had otitis media based on her normal ear exam bilaterally, with no purulence and no serous effusion. Per review of the notes when this was diagnosed she also did not have an abnormal TM, it was based on blood in the canal. She has blood in both canals and on talking with the patient she uses Q-tips which is likely causing trauma to the ear. She has no signs of otitis externa currently. She was instructed that she needs to stop using Q tips in the ear.     She does have swelling of her inferior/tail of the right parotid. She had a CT scan and I spoke with the staff radiologist today and we feel that this may be a focal sialadenitis vs lymph node with overlying infection. There could potentially be an underlying mass present but there is surrounding inflammation which makes it hard to fully discern. She has no saliva from the right parotid duct on exam and her oral mucosa is very dry. I would like to treat her as a presumed sialadenitis. We will put her on augmentin which provides better coverage than amoxicillin which she was instructed to stop. She already has PO vancomycin for the history of C " diff. She was instructed on parotid massage, heat packs, and use of sialagogues. She needs to increase her oral hydration. We will reassess things in clinic in about 1 week. If things worsen she should present to the ER.    Thank you very much for the opportunity to participate in the care of your patient.      Faustina Berman MD, M.D.  Otolaryngology- Head & Neck Surgery      This note was dictated with voice recognition software and then edited. Please excuse any unintentional errors.         CC:  Virgie Heart, ROBERTO  [address] [facility] [address]

## 2019-10-09 NOTE — PROGRESS NOTES
"Dear Virgie Heart:    I had the pleasure of meeting Nicci Pemberton in consultation today at the [facility] at your request.     History of Present Illness:   Nicci Pemberton is a 59 year old woman who is referred urgently for evaluation of parotid mass. She has a history of a liver transplant 7 weeks ago. She was recently admitted to the hospital over the weekend and diagnosed with \"otitis media\" of the right ear. On review of notes and discussion with the patient, she had no signs of otitis media on exam other than blood in the canal. She was started on augmentin. She says about 4 days ago she developed pain and swelling in her right neck (tail of parotid) which has been progressive in nature. It is painful to touch.     She was recently discharged from rehab after her liver transplant. She is dealing with \"veritgo\" which she says is a dizzy sensation when she comes from laying to sitting or sitting to standing. She recently had C diff.        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, alpha 1 anti-trypsin deficiency, HTN, hypothyroidism    Past surgical history: liver transplant      MEDICATIONS:     Current Outpatient Medications   Medication Sig Dispense Refill     acetaminophen-caffeine (EXCEDRIN TENSION HEADACHE) 500-65 MG TABS Take 1 tablet by mouth daily as needed (tension headache)       amoxicillin (AMOXIL) 500 MG capsule Take 1 capsule (500 mg) by mouth every 8 hours 21 capsule 0     ascorbic acid 500 MG TABS Take 1 tablet (500 mg) by mouth daily 30 tablet 0     aspirin (ASA) 325 MG tablet Take 1 tablet (325 mg) by mouth daily 30 tablet 0     bumetanide (BUMEX) 1 MG tablet Take 1 tablet (1 mg) by mouth every other day 30 tablet 0     Cholecalciferol (VITAMIN D-3) 1000 units CAPS Take 1,000 Units by mouth 2 times daily 60 capsule 0     cycloSPORINE modified (GENERIC EQUIVALENT) 100 MG capsule Take 225 mg by mouth 2 times daily Patient has " 25mg and 100mg capsules       dapsone (ACZONE) 100 MG tablet Take 1 tablet (100 mg) by mouth daily 30 tablet 0     famotidine (PEPCID) 20 MG tablet Take 1 tablet (20 mg) by mouth daily 30 tablet 0     levothyroxine (SYNTHROID/LEVOTHROID) 175 MCG tablet Take 1 tablet (175 mcg) by mouth daily 30 tablet 0     magnesium oxide (MAG-OX) 400 MG tablet Take 1 tablet (400 mg) by mouth daily 30 tablet 0     meclizine (ANTIVERT) 12.5 MG tablet Take 1 tablet (12.5 mg) by mouth 3 times daily as needed for dizziness 20 tablet 0     mycophenolic acid (GENERIC EQUIVALENT) 360 MG EC tablet Take 720 mg by mouth 2 times daily       simethicone (MYLICON) 80 MG chewable tablet Take 1 tablet (80 mg) by mouth every 6 hours as needed for cramping (Patient not taking: Reported on 10/7/2019)       sodium chloride (OCEAN) 0.65 % nasal spray Spray 1 spray into both nostrils every hour as needed for congestion 1 Bottle 0     valGANciclovir (VALCYTE) 450 MG tablet Take 1 tablet (450 mg) by mouth daily 30 tablet 0     vancomycin (VANCOCIN HCL) 125 MG capsule Take 1 capsule (125 mg) by mouth 2 times daily 20 capsule 0     zinc sulfate (ZINCATE) 220 (50 Zn) MG capsule Take 1 capsule (220 mg) by mouth daily 30 capsule 0       ALLERGIES:    Allergies   Allergen Reactions     Food      Fruits with cores and pits  Apples     Sulfa Drugs Unknown     Swollen joints     Furosemide Rash       HABITS/SOCIAL HISTORY:     Former smoker    Social History     Socioeconomic History     Marital status:      Spouse name: Not on file     Number of children: Not on file     Years of education: Not on file     Highest education level: Not on file   Occupational History     Not on file   Social Needs     Financial resource strain: Not on file     Food insecurity:     Worry: Not on file     Inability: Not on file     Transportation needs:     Medical: Not on file     Non-medical: Not on file   Tobacco Use     Smoking status: Former Smoker     Last attempt  to quit: 2011     Years since quittin.7     Smokeless tobacco: Never Used     Tobacco comment: weekend smoker    Substance and Sexual Activity     Alcohol use: No     Drug use: No     Sexual activity: Not on file   Lifestyle     Physical activity:     Days per week: Not on file     Minutes per session: Not on file     Stress: Not on file   Relationships     Social connections:     Talks on phone: Not on file     Gets together: Not on file     Attends [employer] service: Not on file     Active member of club or organization: Not on file     Attends meetings of clubs or organizations: Not on file     Relationship status: Not on file     Intimate partner violence:     Fear of current or ex partner: Not on file     Emotionally abused: Not on file     Physically abused: Not on file     Forced sexual activity: Not on file   Other Topics Concern     Parent/sibling w/ CABG, MI or angioplasty before 65F 55M? Not Asked   Social History Narrative     Not on file       PAST MEDICAL HISTORY:   Past Medical History:   Diagnosis Date     Anemia      Cellulitis      Cirrhosis of liver (H) 2018     Heterozygous alpha 1-antitrypsin deficiency (H)      High hepatic iron concentration determined by biopsy of liver      Liver cirrhosis secondary to ANGULO (H)      Liver transplanted (H) 2019     Lymphedema      Osteoarthritis      SBP (spontaneous bacterial peritonitis) (H) 2019 in [facility]        PAST SURGICAL HISTORY:   Past Surgical History:   Procedure Laterality Date     BENCH LIVER N/A 2019    Procedure: BACKBENCH PREPARATION, LIVER;  Surgeon: Mandeep Alford MD;  Location: [facility] OR     COLONOSCOPY N/A 2018    Procedure: COLONOSCOPY;  colonoscopy;  Surgeon: Hugo Patel MD;  Location: [facility] OR     IR FEEDING TUBE PLACEMENT W MOHAN/MD  2019     IR FLUORO 0-1 HOUR  2019     IR LUMBAR PUNCTURE  2019     TRANSPLANT LIVER RECIPIENT  DONOR N/A 2019    Procedure: TRANSPLANT,  "LIVER, RECIPIENT,  DONOR;  Surgeon: Mandeep Alford MD;  Location:  OR       FAMILY HISTORY:    Family History   Problem Relation Age of Onset     Cirrhosis No family hx of      Liver Cancer No family hx of        REVIEW OF SYSTEMS:  12 point ROS was negative other than the symptoms noted above in the HPI.  Patient Supplied Answers to Review of Systems  No flowsheet data found.      PHYSICAL EXAMINATION:   /80   Pulse 88   Ht 1.549 m (5' 1\")   Wt 81.6 kg (180 lb)   BMI 34.01 kg/m    Appearance:   normal; NAD, age-appropriate appearance, well-developed, normal habitus   Communication:   normal; communicates verbally, normal voice quality   Head/Face:   inspection -  Normal; no scars or visible lesions   Palpation - tender along tail of right parotid   Salivary glands -  Palpable firmness, about 1.5 cm in size along right tail of parotid, no overlying skin erythema   Facial strength -  Normal and symmetric bilateral; H/B I/VI   Skin:  slightly jaundice appearing   Ocular Motility:  normal occular movements   Ears:  auricle (AD) -  normal  EAC (AD) -  Small amount of dried blood on the floor of the canal  TM (AD) -  Normal, no effusion  auricle (AS) -  normal  EAC (AS) -  Normal, small amount of dried blood in canal along floor  TM (AS) -  Normal, no effusion  Normal clinical speech reception   Nose:  Ext. inspection -  Normal   Oral Cavity:  lips -  Normal mucosa, oral competence, and stoma size   Age-appropriate dentition, healthy gingival mucosa   Hard palate, buccal, floor of mouth mucosa with xerostomia  Unable to express saliva from parotid duct on right   Tongue - normal movement, no lesions   Neck: No visible mass or asymmetry   Tender along tail of parotid  Normal range of motion   Lymphatic:  no abnormal nodes   Cardiovascular:  warm, pink, well-perfused extremities without swelling, tenderness, or edema   Respiratory:  Normal respiratory effort, no stridor   Neuro/Psych.:  " "mood/affect -  normal  mental status -  normal        RESULTS REVIEWED:   I independently reviewed the CT scan images - there is a questionable mass in the tail of the right parotid with hard to discern borders, on discussion with radiology could very well be a focal sialadenitis based on patient's history    IMPRESSION AND PLAN:   Nicci Pemberton is a 59 year old woman with a history of a recent liver transplant who has a 4 day history of neck swelling. She recently was diagnosed with \"otitis media\". I discussed with her today that I do not think she had otitis media based on her normal ear exam bilaterally, with no purulence and no serous effusion. Per review of the notes when this was diagnosed she also did not have an abnormal TM, it was based on blood in the canal. She has blood in both canals and on talking with the patient she uses Q-tips which is likely causing trauma to the ear. She has no signs of otitis externa currently. She was instructed that she needs to stop using Q tips in the ear.     She does have swelling of her inferior/tail of the right parotid. She had a CT scan and I spoke with the staff radiologist today and we feel that this may be a focal sialadenitis vs lymph node with overlying infection. There could potentially be an underlying mass present but there is surrounding inflammation which makes it hard to fully discern. She has no saliva from the right parotid duct on exam and her oral mucosa is very dry. I would like to treat her as a presumed sialadenitis. We will put her on augmentin which provides better coverage than amoxicillin which she was instructed to stop. She already has PO vancomycin for the history of C diff. She was instructed on parotid massage, heat packs, and use of sialagogues. She needs to increase her oral hydration. We will reassess things in clinic in about 1 week. If things worsen she should present to the ER.    Thank you very much for the opportunity to participate in " the care of your patient.      Faustina Berman MD, M.D.  Otolaryngology- Head & Neck Surgery      This note was dictated with voice recognition software and then edited. Please excuse any unintentional errors.         CC:  Virgie Heart NP  [address] [facility] [id number]

## 2019-10-09 NOTE — TELEPHONE ENCOUNTER
Patient Call: Transplant Illness  If the patient reports CHEST PAIN, SEVERE SHORTNESS OF BREATH, ONE SIDED WEAKNESS, or DIFFICULTY SPEAKING: CONTACT RN FACE-TO-FACE IMMEDIATELY    Duration of illness: 1 week  Transplanted organ? liver  Illness: Other See comment:    Pt was in clinic Monday regarding her swollen lymph node on the right side on the neck below the ear- pt was given antibiotics and was told to call the office if her lymph node were to get bigger/ if she noticed any changes before her antibiotics run out to adjust treatment. The pt stated her lymph node has now double in size. Please connect to advise

## 2019-10-10 ENCOUNTER — TELEPHONE (OUTPATIENT)
Dept: TRANSPLANT | Facility: CLINIC | Age: 59
End: 2019-10-10

## 2019-10-10 ENCOUNTER — PRE VISIT (OUTPATIENT)
Facility: CLINIC | Age: 59
End: 2019-10-10

## 2019-10-10 NOTE — TELEPHONE ENCOUNTER
Patient Call: Please call Nicci # [phone] medication and Lab questions         Call back needed? Yes    Return Call Needed  Same as documented in contacts section  When to return call?: Same day: Route High Priority

## 2019-10-10 NOTE — TELEPHONE ENCOUNTER
FUTURE VISIT INFORMATION      FUTURE VISIT INFORMATION:    Date: 11/13/2019    Time: 8AM    Location: [facility]  REFERRAL INFORMATION:    Referring provider:  Virgie Heart NP    Referring providers clinic:  [facility]     Reason for visit/diagnosis  Vertigo     RECORDS REQUESTED FROM:       Clinic name Comments Records Status Imaging Status   [facility]  Care Everywhere  N/A    [facility]  Care Everywhere N/A

## 2019-10-10 NOTE — TELEPHONE ENCOUNTER
Spoke with Tangela. Pt will have labs drawn on 14th at clinic. Then homecare to draw on 21st. homecare to still go weekly for RN assessment.

## 2019-10-10 NOTE — TELEPHONE ENCOUNTER
Spoke with patient regarding her switching meds from ENT. It's safe for her to switch. Patient is confused on plan with homecare. Left message for [facility] nurse to call.

## 2019-10-11 ENCOUNTER — TELEPHONE (OUTPATIENT)
Dept: TRANSPLANT | Facility: CLINIC | Age: 59
End: 2019-10-11

## 2019-10-11 ENCOUNTER — HOSPITAL ENCOUNTER (OUTPATIENT)
Dept: PHYSICAL THERAPY | Facility: CLINIC | Age: 59
Setting detail: THERAPIES SERIES
End: 2019-10-11
Attending: TRANSPLANT SURGERY
Payer: COMMERCIAL

## 2019-10-11 DIAGNOSIS — R60.9 EDEMA, UNSPECIFIED TYPE: ICD-10-CM

## 2019-10-11 DIAGNOSIS — I89.0 LYMPHEDEMA OF BOTH LOWER EXTREMITIES: Primary | ICD-10-CM

## 2019-10-11 PROCEDURE — 97110 THERAPEUTIC EXERCISES: CPT | Mod: GP | Performed by: PHYSICAL THERAPIST

## 2019-10-11 PROCEDURE — 97535 SELF CARE MNGMENT TRAINING: CPT | Mod: GP | Performed by: PHYSICAL THERAPIST

## 2019-10-11 PROCEDURE — 97140 MANUAL THERAPY 1/> REGIONS: CPT | Mod: GP | Performed by: PHYSICAL THERAPIST

## 2019-10-11 PROCEDURE — 97161 PT EVAL LOW COMPLEX 20 MIN: CPT | Mod: GP | Performed by: PHYSICAL THERAPIST

## 2019-10-11 NOTE — PROGRESS NOTES
"   10/11/19 0900   Rehab Discipline   Discipline PT   Type of Visit   Type of visit Initial Edema Evaluation   General Information   Start of care 10/11/19   Referring physician Mandeep Alford MD   Orders Evaluate and treat as indicated   Order date 10/07/19   Medical diagnosis s/p liver transplant   Onset of illness / date of surgery 19   Edema onset 14  (\"I've had it for years.\")   Affected body parts LLE;RLE;RUE;LUE;Trunk   Edema etiology Surgery   Edema etiology comments post surgery after on-going  liver problems, diagnosed with ANGULO 3 years ago.   Pertinent history of current problem (PT: include personal factors and/or comorbidities that impact the POC; OT: include additional occupational profile info) history of ANGULO and heterozygous alpha-1 antitrypsin deficiency, HTN, and hypothyroidism. Underwent  donor liver transplant on 19. Had a prolonged hospital stay complicated by encephalopathy, recently discharge to home on 10/1/19. On 10/4/19, she developed vertigo and vomiting with standing and presented for evaluation.  Discharged 10/6/2019. Currently she says her legs are as small as they have been for years. Now wearing some type of tubigrip during day and removes during the night. Had 1 fall when she had dizziness per pt, due to dehydration. 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    Surgical / medical history reviewed Yes   Prior treatment Complete decongestive therapy;Compression garments;Elevation   Community support Family / friend caregiver  (lives with spouse)   Patient role / employment history Retired   Living environment House / [facility]   Living environment comments Lives with . Pt has 2 TRISHA, no railing. Pt has a deck with 12 steps with bilateral railings, which is the entrance she normally takes. It is split level from the main entrance, 6 up/6 down, bilateral railings. If she enters from back entrance no steps. Pt has a tub shower. Pt got a    Current " "assistive devices Front wheeled walker   Fall Risk Screen   Fall screen completed by PT   Have you fallen 2 or more times in the past year? No   Have you fallen and had an injury in the past year? Yes   Is patient a fall risk? No   Fall screen comments only 1 incident when dehydrated.   Abuse Screen (yes response referral indicated)   Feels Unsafe at Home or Work/School no   Feels Threatened by Someone no   Does Anyone Try to Keep You From Having Contact with Others or Doing Things Outside Your Home? no   Physical Signs of Abuse Present no   System Outcome Measures   Outcome Measures Lymphedema   Lymphedema Life Impact Scale (score range 0-72). A higher score indicates greater impairment. 13   Subjective Report   Patient report of symptoms Legs feel \"heavy, difficult to \"   Patient / Family Goals   Patient / family goals statement \"I want to find out how swollen you think my legs are. I hate the wraps.    Pain   Patient currently in pain Yes   Pain location stomach   Additional pain locations? Pain location 2;Pain location 3   Pain location 2 R>L knees   Pain rating 2 moderate to severe   Pain location 3 neck   Pain rating 3 moderate, R side distal to ear   Cognitive Status   Orientation Orientation to person, place and time   Level of consciousness Alert   Follows commands and answers questions 100% of the time   Cognitive status comments \"memory not so great yet, it's sporadic. I have problems with dates. \" Spouse said her memory is improving   Edema Exam / Assessment   Skin condition Pitting;Dryness;Intact   Skin condition comments Pt with dusky, dry B LEs with bumpy skin. Large bruise on L knee cap. dry feet with scaling on toes   Pitting 1+   Pitting location B lower anterior legs and dorsal feet   Capillary refill Symmetrical   Stemmer sign Positive   Stemmer sign comments B 2nd toe   Girth Measurements   Girth Measurements Refer to separate girth measurement flowsheet   Volume LE   LE volume comparison " LLE volume greater than RLE volume   Range of Motion   ROM comments decreased L knee due to bruising   Strength   Strength comments decreased strenght B hip and knee extension   Posture   Posture Forward head position;Protracted shoulders   Activities of Daily Living   Activities of Daily Living mostly sedentary, pt and spouse retired   Sensory   Sensory perception comments Pt denies n/t   Planned Edema Interventions   Planned edema interventions Fit for compression garment;Exercises;Precautions to prevent infection / exacerbation;Education;Manual therapy;ADL training;Skin care / precautions;Home management program development   Clinical Impression   Criteria for skilled therapeutic intervention met Yes   Therapy diagnosis Lymphedema of B LEs   Influenced by the following impairments / conditions Stage 2;Edema   Functional limitations due to impairments / conditions see LLIS   Clinical Presentation Stable/Uncomplicated   Clinical Presentation Rationale edema decreasing   Clinical Decision Making (Complexity) Low complexity   Treatment Frequency Other (see comments)   Treatment duration 8 visits over 3 months   Patient / family and/or staff in agreement with plan of care Yes   Risks and benefits of therapy have been explained Yes   Clinical impression comments Pt with decreasing edema, would benefit from PT to continue reduction and have good home program.   Education Assessment   Preferred learning style Listening;Reading;Demonstration   Barriers to learning Physical   Total Evaluation Time   PT Wilman Low Complexity Minutes ([id number]) 20

## 2019-10-11 NOTE — TELEPHONE ENCOUNTER
Provider Call: Home Care  Route to LPN    Reason for Call:Orders for PT-Eval and joel  Nursing weekly for 6 weeks and 3 as needed  Callback needed? Yes    Return Call Needed  Same as documented in contacts section  When to return call?: Greater than one day: Route standard priority

## 2019-10-14 ENCOUNTER — OFFICE VISIT (OUTPATIENT)
Dept: TRANSPLANT | Facility: CLINIC | Age: 59
End: 2019-10-14
Attending: TRANSPLANT SURGERY
Payer: COMMERCIAL

## 2019-10-14 ENCOUNTER — INFUSION THERAPY VISIT (OUTPATIENT)
Dept: INFUSION THERAPY | Facility: CLINIC | Age: 59
End: 2019-10-14
Attending: INTERNAL MEDICINE
Payer: COMMERCIAL

## 2019-10-14 VITALS
WEIGHT: 170.7 LBS | HEIGHT: 61 IN | DIASTOLIC BLOOD PRESSURE: 67 MMHG | SYSTOLIC BLOOD PRESSURE: 91 MMHG | HEART RATE: 88 BPM | BODY MASS INDEX: 32.23 KG/M2 | TEMPERATURE: 98.4 F | RESPIRATION RATE: 18 BRPM

## 2019-10-14 DIAGNOSIS — Z79.899 LONG TERM CURRENT USE OF IMMUNOSUPPRESSIVE DRUG: ICD-10-CM

## 2019-10-14 DIAGNOSIS — Z94.4 LIVER REPLACED BY TRANSPLANT (H): ICD-10-CM

## 2019-10-14 DIAGNOSIS — E86.0 DEHYDRATION: Primary | ICD-10-CM

## 2019-10-14 DIAGNOSIS — Z94.4 STATUS POST LIVER TRANSPLANTATION (H): Primary | Chronic | ICD-10-CM

## 2019-10-14 LAB
ALBUMIN SERPL-MCNC: 3.7 G/DL (ref 3.4–5)
ALP SERPL-CCNC: 173 U/L (ref 40–150)
ALT SERPL W P-5'-P-CCNC: 33 U/L (ref 0–50)
ANION GAP SERPL CALCULATED.3IONS-SCNC: 7 MMOL/L (ref 3–14)
AST SERPL W P-5'-P-CCNC: 36 U/L (ref 0–45)
BILIRUB DIRECT SERPL-MCNC: 1 MG/DL (ref 0–0.2)
BILIRUB SERPL-MCNC: 2.2 MG/DL (ref 0.2–1.3)
BUN SERPL-MCNC: 43 MG/DL (ref 7–30)
CALCIUM SERPL-MCNC: 9.9 MG/DL (ref 8.5–10.1)
CHLORIDE SERPL-SCNC: 96 MMOL/L (ref 94–109)
CO2 SERPL-SCNC: 32 MMOL/L (ref 20–32)
CREAT SERPL-MCNC: 1.08 MG/DL (ref 0.52–1.04)
CYCLOSPORINE BLD LC/MS/MS-MCNC: 385 UG/L (ref 50–400)
ERYTHROCYTE [DISTWIDTH] IN BLOOD BY AUTOMATED COUNT: 15.8 % (ref 10–15)
GFR SERPL CREATININE-BSD FRML MDRD: 56 ML/MIN/{1.73_M2}
GLUCOSE SERPL-MCNC: 98 MG/DL (ref 70–99)
HCT VFR BLD AUTO: 33.7 % (ref 35–47)
HGB BLD-MCNC: 10.7 G/DL (ref 11.7–15.7)
MAGNESIUM SERPL-MCNC: 1.8 MG/DL (ref 1.6–2.3)
MCH RBC QN AUTO: 34.3 PG (ref 26.5–33)
MCHC RBC AUTO-ENTMCNC: 31.8 G/DL (ref 31.5–36.5)
MCV RBC AUTO: 108 FL (ref 78–100)
PHOSPHATE SERPL-MCNC: 4.7 MG/DL (ref 2.5–4.5)
PLATELET # BLD AUTO: 130 10E9/L (ref 150–450)
POTASSIUM SERPL-SCNC: 4.2 MMOL/L (ref 3.4–5.3)
PROT SERPL-MCNC: 7.6 G/DL (ref 6.8–8.8)
RBC # BLD AUTO: 3.12 10E12/L (ref 3.8–5.2)
SODIUM SERPL-SCNC: 135 MMOL/L (ref 133–144)
TME LAST DOSE: NORMAL H
WBC # BLD AUTO: 4.2 10E9/L (ref 4–11)

## 2019-10-14 PROCEDURE — 83735 ASSAY OF MAGNESIUM: CPT | Performed by: TRANSPLANT SURGERY

## 2019-10-14 PROCEDURE — 80076 HEPATIC FUNCTION PANEL: CPT | Performed by: TRANSPLANT SURGERY

## 2019-10-14 PROCEDURE — 36415 COLL VENOUS BLD VENIPUNCTURE: CPT | Performed by: TRANSPLANT SURGERY

## 2019-10-14 PROCEDURE — 84100 ASSAY OF PHOSPHORUS: CPT | Performed by: TRANSPLANT SURGERY

## 2019-10-14 PROCEDURE — G0463 HOSPITAL OUTPT CLINIC VISIT: HCPCS | Mod: ZF

## 2019-10-14 PROCEDURE — 80048 BASIC METABOLIC PNL TOTAL CA: CPT | Performed by: TRANSPLANT SURGERY

## 2019-10-14 PROCEDURE — P9041 ALBUMIN (HUMAN),5%, 50ML: HCPCS | Mod: ZF | Performed by: TRANSPLANT SURGERY

## 2019-10-14 PROCEDURE — 80158 DRUG ASSAY CYCLOSPORINE: CPT | Performed by: TRANSPLANT SURGERY

## 2019-10-14 PROCEDURE — 96374 THER/PROPH/DIAG INJ IV PUSH: CPT

## 2019-10-14 PROCEDURE — 85027 COMPLETE CBC AUTOMATED: CPT | Performed by: TRANSPLANT SURGERY

## 2019-10-14 PROCEDURE — 25000128 H RX IP 250 OP 636: Mod: ZF | Performed by: TRANSPLANT SURGERY

## 2019-10-14 RX ORDER — FAMOTIDINE 20 MG/1
20 TABLET, FILM COATED ORAL 2 TIMES DAILY
Qty: 30 TABLET | Refills: 1 | Status: SHIPPED | OUTPATIENT
Start: 2019-10-14 | End: 2019-11-04

## 2019-10-14 RX ORDER — ALBUMIN, HUMAN INJ 5% 5 %
250 SOLUTION INTRAVENOUS ONCE
Status: COMPLETED | OUTPATIENT
Start: 2019-10-14 | End: 2019-10-14

## 2019-10-14 RX ORDER — MYCOPHENOLIC ACID 360 MG/1
360 TABLET, DELAYED RELEASE ORAL 3 TIMES DAILY
Qty: 90 TABLET | Refills: 1 | Status: SHIPPED | OUTPATIENT
Start: 2019-10-14 | End: 2019-10-29

## 2019-10-14 RX ORDER — ALBUMIN, HUMAN INJ 5% 5 %
250 SOLUTION INTRAVENOUS ONCE
Status: CANCELLED
Start: 2019-10-14

## 2019-10-14 RX ADMIN — ALBUMIN HUMAN 250 ML: 0.05 INJECTION, SOLUTION INTRAVENOUS at 10:39

## 2019-10-14 ASSESSMENT — MIFFLIN-ST. JEOR: SCORE: 1286.67

## 2019-10-14 ASSESSMENT — PAIN SCALES - GENERAL: PAINLEVEL: NO PAIN (0)

## 2019-10-14 NOTE — NURSING NOTE
"Chief Complaint   Patient presents with     Surgical Followup     liver follow up     Blood pressure 91/67, pulse 88, temperature 98.4  F (36.9  C), temperature source Oral, resp. rate 18, height 1.549 m (5' 1\"), weight 77.4 kg (170 lb 11.2 oz), not currently breastfeeding.    Dawna Powers CMA on 10/14/2019 at 8:20 AM    "

## 2019-10-14 NOTE — PROGRESS NOTES
HPI      ROS      Physical Exam    Transplant Surgery -OUTPATIENT IMMUNOSUPPRESSION PROGRESS NOTE    Date of Visit: 10/14/2019    Transplants:  8/18/2019 (Liver); Postoperative day:  57  ASSESMENT AND PLAN:  1.Graft Function:elevated bilirubin, will repeat labs and if elevated will need a ERCP  2.Immunosuppression Management: keep cyclosporine levels at 300; myfortic 360 mg po tid  3.Hypertension: ok  4.Renal Function:ok  5.Lab frequency: twice weekly  6.Other:  Parotid swelling improved  Reduce asprin to 81 mg  Increase pepcid to bid  Stop bumex      Date: October 14, 2019    Transplant:  [x]                             Liver [x]                              Kidney []                             Pancreas []                              Other:             Chief Complaint:  Pain in the neck has improved  History of Present Illness:  Post liver transplant    Patient Active Problem List   Diagnosis     Cirrhosis of liver (H)     Liver cirrhosis secondary to ANGULO (H)     Heterozygous alpha 1-antitrypsin deficiency (H)     High hepatic iron concentration determined by biopsy of liver     SBP (spontaneous bacterial peritonitis) (H)     Acute kidney failure, unspecified (H)     Status post liver transplantation (H)     Epistaxis     Anemia due to blood loss, acute     C. difficile diarrhea     Metabolic encephalopathy     GAMAL (acute kidney injury) (H)     Steroid-induced hyperglycemia     Immunosuppressed status (H)     Hyperkalemia     Severe malnutrition (H)     S/P liver transplant (H)     Vertigo     Otitis media     Hypoxia     SOCIAL /FAMILY HISTORY: [x]                  No recent change    Past Medical History:   Diagnosis Date     Anemia      Cellulitis      Cirrhosis of liver (H) 6/18/2018     Heterozygous alpha 1-antitrypsin deficiency (H)      High hepatic iron concentration determined by biopsy of liver      Liver cirrhosis secondary to ANGULO (H)      Liver transplanted (H) 08/18/2019     Lymphedema       Osteoarthritis      SBP (spontaneous bacterial peritonitis) (H) 2019 in [place]     Past Surgical History:   Procedure Laterality Date     BENCH LIVER N/A 2019    Procedure: BACKBENCH PREPARATION, LIVER;  Surgeon: Mandeep Alford MD;  Location: [facility] OR     COLONOSCOPY N/A 2018    Procedure: COLONOSCOPY;  colonoscopy;  Surgeon: Hugo Patel MD;  [facility]     IR FEEDING TUBE PLACEMENT W FLUORO/MD  2019     IR FLUORO 0-1 HOUR  2019     IR LUMBAR PUNCTURE  2019     TRANSPLANT LIVER RECIPIENT  DONOR N/A 2019    Procedure: TRANSPLANT, LIVER, RECIPIENT,  DONOR;  Surgeon: Mandeep Alford MD;  Location: [facility] OR     Social History     Socioeconomic History     Marital status:      Spouse name: Not on file     Number of children: Not on file     Years of education: Not on file     Highest education level: Not on file   Occupational History     Not on file   Social Needs     Financial resource strain: Not on file     Food insecurity:     Worry: Not on file     Inability: Not on file     Transportation needs:     Medical: Not on file     Non-medical: Not on file   Tobacco Use     Smoking status: Former Smoker     Last attempt to quit: 2011     Years since quittin.7     Smokeless tobacco: Never Used     Tobacco comment: weekend smoker    Substance and Sexual Activity     Alcohol use: No     Drug use: No     Sexual activity: Not on file   Lifestyle     Physical activity:     Days per week: Not on file     Minutes per session: Not on file     Stress: Not on file   Relationships     Social connections:     Talks on phone: Not on file     Gets together: Not on file     Attends Faith service: Not on file     Active member of club or organization: Not on file     Attends meetings of clubs or organizations: Not on file     Relationship status: Not on file     Intimate partner violence:     Fear of current or ex partner: Not on file     Emotionally  abused: Not on file     Physically abused: Not on file     Forced sexual activity: Not on file   Other Topics Concern     Parent/sibling w/ CABG, MI or angioplasty before 65F 55M? Not Asked   Social History Narrative     Not on file     Prescription Medications as of 10/14/2019       Rx Number Disp Refills Start End Last Dispensed Date Next Fill Date Owning Pharmacy    acetaminophen-caffeine (EXCEDRIN TENSION HEADACHE) 500-65 MG TABS    9/30/2019        Sig: Take 1 tablet by mouth daily as needed (tension headache)    Class: OTC    Route: Oral    amoxicillin-clavulanate (AUGMENTIN) 875-125 MG tablet  28 tablet 0 10/9/2019 10/23/2019   [phone]    Sig: Take 1 tablet by mouth every 12 hours for 28 doses    Class: E-Prescribe    Route: Oral    ascorbic acid 500 MG TABS  30 tablet 0 9/30/2019    [place] [address] [address]    Sig: Take 1 tablet (500 mg) by mouth daily    Class: E-Prescribe    Route: Oral    aspirin (ASA) 325 MG tablet  30 tablet 0 9/30/2019    [place] [address] [address]    Sig: Take 1 tablet (325 mg) by mouth daily    Class: E-Prescribe    Route: Oral    bumetanide (BUMEX) 1 MG tablet  30 tablet 0 10/7/2019    [address]    Sig: Take 1 tablet (1 mg) by mouth every other day    Class: E-Prescribe    Route: Oral    Cholecalciferol (VITAMIN D-3) 1000 units CAPS  60 capsule 0 9/30/2019    [place] [address] [address]    Sig: Take 1,000 Units by mouth 2 times daily    Class: E-Prescribe    Route: Oral    cycloSPORINE modified (GENERIC EQUIVALENT) 100 MG capsule            Sig: Take 225 mg by mouth 2 times daily Patient has 25mg and 100mg capsules    Class: Historical    Route: Oral    dapsone (ACZONE) 100 MG tablet  30 tablet 0 9/30/2019    [facility]  [facility] [address]    Sig: Take 1 tablet (100 mg) by mouth daily    Class: E-Prescribe    Route: Oral    famotidine (PEPCID) 20 MG tablet  30 tablet 0 9/30/2019    [place] [address] [address]    Sig: Take 1 tablet (20 mg) by mouth daily    Class: E-Prescribe    Route: Oral    levothyroxine (SYNTHROID/LEVOTHROID) 175 MCG tablet  30 tablet 0 9/30/2019    [place] [address] [address]    Sig: Take 1 tablet (175 mcg) by mouth daily    Class: E-Prescribe    Route: Oral    magnesium oxide (MAG-OX) 400 MG tablet  30 tablet 0 10/1/2019    [place] [address] [address]    Sig: Take 1 tablet (400 mg) by mouth daily    Class: E-Prescribe    Route: Oral    meclizine (ANTIVERT) 12.5 MG tablet  20 tablet 0 10/6/2019    [address]    Sig: Take 1 tablet (12.5 mg) by mouth 3 times daily as needed for dizziness    Class: E-Prescribe    Route: Oral    mycophenolic acid (GENERIC EQUIVALENT) 360 MG EC tablet            Sig: Take 720 mg by mouth 2 times daily    Class: Historical    Route: Oral    simethicone (MYLICON) 80 MG chewable tablet    9/30/2019        Sig: Take 1 tablet (80 mg) by mouth every 6 hours as needed for cramping    Class: OTC    Route: Oral    sodium chloride (OCEAN) 0.65 % nasal spray  1 Bottle 0 10/6/2019    [address]    Sig: [place] 1 spray into both nostrils every hour as needed for congestion    Class: E-Prescribe    Route: Both Nostrils    valGANciclovir (VALCYTE) 450 MG tablet  30 tablet 0 9/30/2019    [place] [address] [address]    Sig: Take 1 tablet (450 mg) by mouth daily    Class: E-Prescribe    Route: Oral    vancomycin (VANCOCIN HCL) 125 MG capsule  20 capsule 0 10/6/2019    [facility]  "[address] [facility]    Sig: Take 1 capsule (125 mg) by mouth 2 times daily    Class: E-Prescribe    Route: Oral    zinc sulfate (ZINCATE) 220 (50 Zn) MG capsule  30 capsule 0 9/30/2019    [place], [place] - [address]    Sig: Take 1 capsule (220 mg) by mouth daily    Class: E-Prescribe    Route: Oral        Food; Sulfa drugs; and Furosemide   REVIEW OF SYSTEMS (check box if normal)  [x]               GENERAL  [x]                 PULMONARY [x]                GENITOURINARY  [x]                CNS                 [x]                 CARDIAC  [x]                 ENDOCRINE  [x]                EARS,NOSE,THROAT [x]                 GASTROINTESTINAL [x]                 NEUROLOGIC    [x]                MUSCLOSKELTAL  [x]                  HEMATOLOGY      PHYSICAL EXAM (check box if normal)BP 91/67   Pulse 88   Temp 98.4  F (36.9  C) (Oral)   Resp 18   Ht 1.549 m (5' 1\")   Wt 77.4 kg (170 lb 11.2 oz)   BMI 32.25 kg/m          [x]            GENERAL:    [x]       EYES:  ICTERIC   []        YES  []                    NO  [x]           EXTREMITIES: Clubbing []       Y     [x]           N    [x]           EARS, NOSE, THROAT: Membranes Moist    YES   [x]                   NO []        Neck swelling improved          [x]           LUNGS:  CLEAR    YES       [x]                  NO    []                                [x]           SKIN: Jaundice           YES       []                  NO    [x]                   Rash: YES       []                  NO    [x]                                     [x]             HEART: Regular Rate          YES       [x]                  NO    []                   Incision Clean:  YES       [x]                  NO    []                                [x]                    ABDOMEN: Soft Organomegaly YES       []                  NO    [x]                       [x]                    NEUROLOGICAL:  Nonfocal  YES       [x]                  NO    []                   "     [x]                    Hernia YES       []                  NO    [x]                   PSYCHIATRIC:  Appropriate  YES       [x]                  NO    []                       OTHER:                                                                                                   PAIN SCALE:: 3

## 2019-10-14 NOTE — LETTER
10/14/2019      RE: Nicci Pemberton  [address] [facility] [id number]       HPI      ROS      Physical Exam    Transplant Surgery -OUTPATIENT IMMUNOSUPPRESSION PROGRESS NOTE    Date of Visit: 10/14/2019    Transplants:  8/18/2019 (Liver); Postoperative day:  57  ASSESMENT AND PLAN:  1.Graft Function:elevated bilirubin, will repeat labs and if elevated will need a ERCP  2.Immunosuppression Management: keep cyclosporine levels at 300; myfortic 360 mg po tid  3.Hypertension: ok  4.Renal Function:ok  5.Lab frequency: twice weekly  6.Other:  Parotid swelling improved  Reduce asprin to 81 mg  Increase pepcid to bid  Stop bumex      Date: October 14, 2019    Transplant:  [x]                             Liver [x]                              Kidney []                             Pancreas []                              Other:             Chief Complaint:  Pain in the neck has improved  History of Present Illness:  Post liver transplant    Patient Active Problem List   Diagnosis     Cirrhosis of liver (H)     Liver cirrhosis secondary to ANGULO (H)     Heterozygous alpha 1-antitrypsin deficiency (H)     High hepatic iron concentration determined by biopsy of liver     SBP (spontaneous bacterial peritonitis) (H)     Acute kidney failure, unspecified (H)     Status post liver transplantation (H)     Epistaxis     Anemia due to blood loss, acute     C. difficile diarrhea     Metabolic encephalopathy     GAMAL (acute kidney injury) (H)     Steroid-induced hyperglycemia     Immunosuppressed status (H)     Hyperkalemia     Severe malnutrition (H)     S/P liver transplant (H)     Vertigo     Otitis media     Hypoxia     SOCIAL /FAMILY HISTORY: [x]                  No recent change    Past Medical History:   Diagnosis Date     Anemia      Cellulitis      Cirrhosis of liver (H) 6/18/2018     Heterozygous alpha 1-antitrypsin deficiency (H)      High hepatic iron concentration determined by biopsy of liver      Liver cirrhosis  secondary to ANGULO (H)      Liver transplanted (H) 2019     Lymphedema      Osteoarthritis      SBP (spontaneous bacterial peritonitis) (H) 2019 in [place]     Past Surgical History:   Procedure Laterality Date     BENCH LIVER N/A 2019    Procedure: BACKBENCH PREPARATION, LIVER;  Surgeon: Mandeep Alford MD;  Location: [facility] OR     COLONOSCOPY N/A 2018    Procedure: COLONOSCOPY;  colonoscopy;  Surgeon: Hugo Patel MD;  [facility]     IR FEEDING TUBE PLACEMENT W MOHAN/MD  2019     IR FLUORO 0-1 HOUR  2019     IR LUMBAR PUNCTURE  2019     TRANSPLANT LIVER RECIPIENT  DONOR N/A 2019    Procedure: TRANSPLANT, LIVER, RECIPIENT,  DONOR;  Surgeon: Mandeep Alford MD;  Location: [facility] OR     Social History     Socioeconomic History     Marital status:      Spouse name: Not on file     Number of children: Not on file     Years of education: Not on file     Highest education level: Not on file   Occupational History     Not on file   Social Needs     Financial resource strain: Not on file     Food insecurity:     Worry: Not on file     Inability: Not on file     Transportation needs:     Medical: Not on file     Non-medical: Not on file   Tobacco Use     Smoking status: Former Smoker     Last attempt to quit: 2011     Years since quittin.7     Smokeless tobacco: Never Used     Tobacco comment: weekend smoker    Substance and Sexual Activity     Alcohol use: No     Drug use: No     Sexual activity: Not on file   Lifestyle     Physical activity:     Days per week: Not on file     Minutes per session: Not on file     Stress: Not on file   Relationships     Social connections:     Talks on phone: Not on file     Gets together: Not on file     Attends Congregation service: Not on file     Active member of club or organization: Not on file     Attends meetings of clubs or organizations: Not on file     Relationship status: Not on file     Intimate  partner violence:     Fear of current or ex partner: Not on file     Emotionally abused: Not on file     Physically abused: Not on file     Forced sexual activity: Not on file   Other Topics Concern     Parent/sibling w/ CABG, MI or angioplasty before 65F 55M? Not Asked   Social History Narrative     Not on file     Prescription Medications as of 10/14/2019       Rx Number Disp Refills Start End Last Dispensed Date Next Fill Date Owning Pharmacy    acetaminophen-caffeine (EXCEDRIN TENSION HEADACHE) 500-65 MG TABS    9/30/2019        Sig: Take 1 tablet by mouth daily as needed (tension headache)    Class: OTC    Route: Oral    amoxicillin-clavulanate (AUGMENTIN) 875-125 MG tablet  28 tablet 0 10/9/2019 10/23/2019   [phone]    Sig: Take 1 tablet by mouth every 12 hours for 28 doses    Class: E-Prescribe    Route: Oral    ascorbic acid 500 MG TABS  30 tablet 0 9/30/2019    [place] [address] [address]    Sig: Take 1 tablet (500 mg) by mouth daily    Class: E-Prescribe    Route: Oral    aspirin (ASA) 325 MG tablet  30 tablet 0 9/30/2019    [place] [address] [address]    Sig: Take 1 tablet (325 mg) by mouth daily    Class: E-Prescribe    Route: Oral    bumetanide (BUMEX) 1 MG tablet  30 tablet 0 10/7/2019    [phone]    Sig: Take 1 tablet (1 mg) by mouth every other day    Class: E-Prescribe    Route: Oral    Cholecalciferol (VITAMIN D-3) 1000 units CAPS  60 capsule 0 9/30/2019    [place] [address] [address]    Sig: Take 1,000 Units by mouth 2 times daily    Class: E-Prescribe    Route: Oral    cycloSPORINE modified (GENERIC EQUIVALENT) 100 MG capsule            Sig: Take 225 mg by mouth 2 times daily Patient has 25mg and 100mg capsules    Class: Historical    Route: Oral     dapsone (ACZONE) 100 MG tablet  30 tablet 0 9/30/2019    [facility] [address]    Sig: Take 1 tablet (100 mg) by mouth daily    Class: E-Prescribe    Route: Oral    famotidine (PEPCID) 20 MG tablet  30 tablet 0 9/30/2019    [place] [address] [address]    Sig: Take 1 tablet (20 mg) by mouth daily    Class: E-Prescribe    Route: Oral    levothyroxine (SYNTHROID/LEVOTHROID) 175 MCG tablet  30 tablet 0 9/30/2019    [facility] [address]    Sig: Take 1 tablet (175 mcg) by mouth daily    Class: E-Prescribe    Route: Oral    magnesium oxide (MAG-OX) 400 MG tablet  30 tablet 0 10/1/2019    [place] [address] [address]    Sig: Take 1 tablet (400 mg) by mouth daily    Class: E-Prescribe    Route: Oral    meclizine (ANTIVERT) 12.5 MG tablet  20 tablet 0 10/6/2019    [address]    Sig: Take 1 tablet (12.5 mg) by mouth 3 times daily as needed for dizziness    Class: E-Prescribe    Route: Oral    mycophenolic acid (GENERIC EQUIVALENT) 360 MG EC tablet            Sig: Take 720 mg by mouth 2 times daily    Class: Historical    Route: Oral    simethicone (MYLICON) 80 MG chewable tablet    9/30/2019        Sig: Take 1 tablet (80 mg) by mouth every 6 hours as needed for cramping    Class: OTC    Route: Oral    sodium chloride (OCEAN) 0.65 % nasal spray  1 Bottle 0 10/6/2019    [address]    Sig: [place] 1 spray into both nostrils every hour as needed for congestion    Class: E-Prescribe    Route: Both Nostrils    valGANciclovir (VALCYTE) 450 MG tablet  30 tablet 0 9/30/2019    [facility] [address]    Sig: Take 1 tablet (450 mg) by mouth daily    Class: E-Prescribe    Route: Oral    vancomycin (VANCOCIN HCL) 125 MG capsule  20  "capsule 0 10/6/2019    [place], [place] - [address]    Sig: Take 1 capsule (125 mg) by mouth 2 times daily    Class: E-Prescribe    Route: Oral    zinc sulfate (ZINCATE) 220 (50 Zn) MG capsule  30 capsule 0 9/30/2019    [place], [place] - [address]    Sig: Take 1 capsule (220 mg) by mouth daily    Class: E-Prescribe    Route: Oral        Food; Sulfa drugs; and Furosemide   REVIEW OF SYSTEMS (check box if normal)  [x]               GENERAL  [x]                 PULMONARY [x]                GENITOURINARY  [x]                CNS                 [x]                 CARDIAC  [x]                 ENDOCRINE  [x]                EARS,NOSE,THROAT [x]                 GASTROINTESTINAL [x]                 NEUROLOGIC    [x]                MUSCLOSKELTAL  [x]                  HEMATOLOGY      PHYSICAL EXAM (check box if normal)BP 91/67   Pulse 88   Temp 98.4  F (36.9  C) (Oral)   Resp 18   Ht 1.549 m (5' 1\")   Wt 77.4 kg (170 lb 11.2 oz)   BMI 32.25 kg/m           [x]            GENERAL:    [x]       EYES:  ICTERIC   []        YES  []                    NO  [x]           EXTREMITIES: Clubbing []       Y     [x]           N    [x]           EARS, NOSE, THROAT: Membranes Moist    YES   [x]                   NO []        Neck swelling improved          [x]           LUNGS:  CLEAR    YES       [x]                  NO    []                                [x]           SKIN: Jaundice           YES       []                  NO    [x]                   Rash: YES       []                  NO    [x]                                     [x]             HEART: Regular Rate          YES       [x]                  NO    []                   Incision Clean:  YES       [x]                  NO    []                                [x]                    ABDOMEN: Soft Organomegaly YES       []                  NO    [x]                       [x]                    " NEUROLOGICAL:  Nonfocal  YES       [x]                  NO    []                       [x]                    Hernia YES       []                  NO    [x]                   PSYCHIATRIC:  Appropriate  YES       [x]                  NO    []                       OTHER:                                                                                                   PAIN SCALE:: 3    Mandeep Alford MD

## 2019-10-14 NOTE — LETTER
10/14/2019     RE: Nicci Pemberton  [address] [facility] [address]     Dear Colleague,    Thank you for referring your patient, Nicci Pemberton, to the [facility]ID [facility]GAN TRANSPL[facility] at [facility]. Please see a copy of my visit note below.    Transplant Surgery -OUTPATIENT IMMUNOSUPPRESSION PROGRESS NOTE    Date of Visit: 10/14/2019    Transplants:  8/18/2019 (Liver); Postoperative day:  57  ASSESMENT AND PLAN:  1.Graft Function:elevated bilirubin, will repeat labs and if elevated will need a ERCP  2.Immunosuppression Management: keep cyclosporine levels at 300; myfortic 360 mg po tid  3.Hypertension: ok  4.Renal Function:ok  5.Lab frequency: twice weekly  6.Other:  Parotid swelling improved  Reduce asprin to 81 mg  Increase pepcid to bid  Stop bumex      Date: October 14, 2019    Transplant:  [x]                             Liver [x]                              Kidney []                             Pancreas []                              Other:             Chief Complaint:  Pain in the neck has improved  History of Present Illness:  Post liver transplant    Patient Active Problem List   Diagnosis     Cirrhosis of liver (H)     Liver cirrhosis secondary to ANGULO (H)     Heterozygous alpha 1-antitrypsin deficiency (H)     High hepatic iron concentration determined by biopsy of liver     SBP (spontaneous bacterial peritonitis) (H)     Acute kidney failure, unspecified (H)     Status post liver transplantation (H)     Epistaxis     Anemia due to blood loss, acute     C. difficile diarrhea     Metabolic encephalopathy     GAMAL (acute kidney injury) (H)     Steroid-induced hyperglycemia     Immunosuppressed status (H)     Hyperkalemia     Severe malnutrition (H)     S/P liver transplant (H)     Vertigo     Otitis media     Hypoxia     SOCIAL /FAMILY HISTORY: [x]                  No recent change    Past Medical History:   Diagnosis Date     Anemia      Cellulitis       Cirrhosis of liver (H) 2018     Heterozygous alpha 1-antitrypsin deficiency (H)      High hepatic iron concentration determined by biopsy of liver      Liver cirrhosis secondary to ANGULO (H)      Liver transplanted (H) 2019     Lymphedema      Osteoarthritis      SBP (spontaneous bacterial peritonitis) (H) 2019 in [place]     Past Surgical History:   Procedure Laterality Date     BENCH LIVER N/A 2019    Procedure: BACKBENCH PREPARATION, LIVER;  Surgeon: Mandeep Alford MD;  Location: [facility] OR     COLONOSCOPY N/A 2018    Procedure: COLONOSCOPY;  colonoscopy;  Surgeon: Hugo Patel MD;  Location: [facility]     IR FEEDING TUBE PLACEMENT W FLUORO/MD  2019     IR FLUORO 0-1 HOUR  2019     IR LUMBAR PUNCTURE  2019     TRANSPLANT LIVER RECIPIENT  DONOR N/A 2019    Procedure: TRANSPLANT, LIVER, RECIPIENT,  DONOR;  Surgeon: Mandeep Alford MD;  Location: [facility] OR     Social History     Socioeconomic History     Marital status:      Spouse name: Not on file     Number of children: Not on file     Years of education: Not on file     Highest education level: Not on file   Occupational History     Not on file   Social Needs     Financial resource strain: Not on file     Food insecurity:     Worry: Not on file     Inability: Not on file     Transportation needs:     Medical: Not on file     Non-medical: Not on file   Tobacco Use     Smoking status: Former Smoker     Last attempt to quit: 2011     Years since quittin.7     Smokeless tobacco: Never Used     Tobacco comment: weekend smoker    Substance and Sexual Activity     Alcohol use: No     Drug use: No     Sexual activity: Not on file   Lifestyle     Physical activity:     Days per week: Not on file     Minutes per session: Not on file     Stress: Not on file   Relationships     Social connections:     Talks on phone: Not on file     Gets together: Not on file     Attends [employer] service: Not on  file     Active member of club or organization: Not on file     Attends meetings of clubs or organizations: Not on file     Relationship status: Not on file     Intimate partner violence:     Fear of current or ex partner: Not on file     Emotionally abused: Not on file     Physically abused: Not on file     Forced sexual activity: Not on file   Other Topics Concern     Parent/sibling w/ CABG, MI or angioplasty before 65F 55M? Not Asked   Social History Narrative     Not on file     Prescription Medications as of 10/14/2019       Rx Number Disp Refills Start End Last Dispensed Date Next Fill Date Owning Pharmacy    acetaminophen-caffeine (EXCEDRIN TENSION HEADACHE) 500-65 MG TABS    9/30/2019        Sig: Take 1 tablet by mouth daily as needed (tension headache)    Class: OTC    Route: Oral    amoxicillin-clavulanate (AUGMENTIN) 875-125 MG tablet  28 tablet 0 10/9/2019 10/23/2019   [address]    Sig: Take 1 tablet by mouth every 12 hours for 28 doses    Class: E-Prescribe    Route: Oral    ascorbic acid 500 MG TABS  30 tablet 0 9/30/2019    [place] [address] [address]    Sig: Take 1 tablet (500 mg) by mouth daily    Class: E-Prescribe    Route: Oral    aspirin (ASA) 325 MG tablet  30 tablet 0 9/30/2019    [place] [address] [address]    Sig: Take 1 tablet (325 mg) by mouth daily    Class: E-Prescribe    Route: Oral    bumetanide (BUMEX) 1 MG tablet  30 tablet 0 10/7/2019    [phone]    Sig: Take 1 tablet (1 mg) by mouth every other day    Class: E-Prescribe    Route: Oral    Cholecalciferol (VITAMIN D-3) 1000 units CAPS  60 capsule 0 9/30/2019    [place] [address]    Sig: Take 1,000 Units by mouth 2 times daily    Class: E-Prescribe    Route: Oral     cycloSPORINE modified (GENERIC EQUIVALENT) 100 MG capsule            Sig: Take 225 mg by mouth 2 times daily Patient has 25mg and 100mg capsules    Class: Historical    Route: Oral    dapsone (ACZONE) 100 MG tablet  30 tablet 0 9/30/2019    [place] [address] [address]    Sig: Take 1 tablet (100 mg) by mouth daily    Class: E-Prescribe    Route: Oral    famotidine (PEPCID) 20 MG tablet  30 tablet 0 9/30/2019    [place] [address] [address]    Sig: Take 1 tablet (20 mg) by mouth daily    Class: E-Prescribe    Route: Oral    levothyroxine (SYNTHROID/LEVOTHROID) 175 MCG tablet  30 tablet 0 9/30/2019    [place] [address]    Sig: Take 1 tablet (175 mcg) by mouth daily    Class: E-Prescribe    Route: Oral    magnesium oxide (MAG-OX) 400 MG tablet  30 tablet 0 10/1/2019    [place] [address]    Sig: Take 1 tablet (400 mg) by mouth daily    Class: E-Prescribe    Route: Oral    meclizine (ANTIVERT) 12.5 MG tablet  20 tablet 0 10/6/2019    [address]    Sig: Take 1 tablet (12.5 mg) by mouth 3 times daily as needed for dizziness    Class: E-Prescribe    Route: Oral    mycophenolic acid (GENERIC EQUIVALENT) 360 MG EC tablet            Sig: Take 720 mg by mouth 2 times daily    Class: Historical    Route: Oral    simethicone (MYLICON) 80 MG chewable tablet    9/30/2019        Sig: Take 1 tablet (80 mg) by mouth every 6 hours as needed for cramping    Class: OTC    Route: Oral    sodium chloride (OCEAN) 0.65 % nasal spray  1 Bottle 0 10/6/2019    [address]    Sig: [place] 1 spray into both nostrils every hour as needed for congestion    Class: E-Prescribe    Route: Both Nostrils    valGANciclovir (VALCYTE) 450 MG tablet  30 tablet 0 9/30/2019    [facility] [place], [place] - [address]    Sig: Take 1 tablet (450 mg) by mouth daily    Class: E-Prescribe    Route: Oral    vancomycin (VANCOCIN HCL) 125 MG capsule  20 capsule 0 10/6/2019    [place], [place] - [address] [facility]    Sig: Take 1 capsule (125 mg) by mouth 2 times daily    Class: E-Prescribe    Route: Oral    zinc sulfate (ZINCATE) 220 (50 Zn) MG capsule  30 capsule 0 9/30/2019    [place], [place] - [address]    Sig: Take 1 capsule (220 mg) by mouth daily    Class: E-Prescribe    Route: Oral        Food; Sulfa drugs; and Furosemide   REVIEW OF SYSTEMS (check box if normal)  [x]               GENERAL  [x]                 PULMONARY [x]                GENITOURINARY  [x]                CNS                 [x]                 CARDIAC  [x]                 ENDOCRINE  [x]                EARS,NOSE,THROAT [x]                 GASTROINTESTINAL [x]                 NEUROLOGIC    [x]                MUSCLOSKELTAL  [x]                  HEMATOLOGY      PHYSICAL EXAM (check box if normal)BP 91/67   Pulse 88   Temp 98.4  F (36.9  C) (Oral)   Resp 18   Ht 1.549 m (5' 1\")   Wt 77.4 kg (170 lb 11.2 oz)   BMI 32.25 kg/m           [x]            GENERAL:    [x]       EYES:  ICTERIC   []        YES  []                    NO  [x]           EXTREMITIES: Clubbing []       Y     [x]           N    [x]           EARS, NOSE, THROAT: Membranes Moist    YES   [x]                   NO []        Neck swelling improved          [x]           LUNGS:  CLEAR    YES       [x]                  NO    []                                [x]           SKIN: Jaundice           YES       []                  NO    [x]                   Rash: YES       []                  NO    [x]                                     [x]             HEART: Regular Rate          YES       [x]                  NO    []                   Incision Clean:  YES       [x]                  " NO    []                                [x]                    ABDOMEN: Soft Organomegaly YES       []                  NO    [x]                       [x]                    NEUROLOGICAL:  Nonfocal  YES       [x]                  NO    []                       [x]                    Hernia YES       []                  NO    [x]                   PSYCHIATRIC:  Appropriate  YES       [x]                  NO    []                       OTHER:                                                                                                   PAIN SCALE:: 3    Again, thank you for allowing me to participate in the care of your patient.      Sincerely,    Mandeep Alford MD

## 2019-10-14 NOTE — PROGRESS NOTES
Nursing Note  Nicci Pemberton presents today to [facility] for:   Chief Complaint   Patient presents with     Infusion     albumin     During today's [facility] appointment, orders from Dr. Alford were completed.  Frequency: once    Progress note:  Patient identification verified by name and date of birth.  Assessment completed.  Vitals recorded in Doc Flowsheets.  Patient was provided with education regarding infusion and possible side effects.  Patient verbalized understanding.     present during visit today: Not Applicable.    Treatment Conditions: non-applicable.    Premedications: were not ordered.    Drug Waste Record: No    Infusion length and rate:  infusion given over approximately 1039 to 1158      Labs: drawn today, prior to appointment in lab.    Vascular access: peripheral IV placed today.    Post Infusion Assessment:  Patient tolerated infusion without incident.     Discharge Plan:   Follow up plan of care with: primary medical doctor. and transplant coordinator.  Discharge instructions were reviewed with patient.  Patient/representative verbalized understanding of discharge instructions and all questions answered.  Patient discharged from [facility] in stable condition.    Pat Salazar RN    Administrations This Visit     albumin human 5 % injection 250 mL     Admin Date  10/14/2019 Action  Given Dose  250 mL Route  Intravenous Administered By  Pat Salazar RN                There were no vitals taken for this visit.

## 2019-10-14 NOTE — PATIENT INSTRUCTIONS
Dear Nicci Pemberton    Thank you for choosing [facility] ([facility]) for your infusion.  The following information is a summary of our appointment as well as important reminders.          We look forward in seeing you on your next appointment here at [facility] ([facility]).  Please don t hesitate to call us at [phone] to reschedule any of your appointments or to speak with one of the [facility] registered nurses.  It was a pleasure taking care of you today.    Sincerely,    [facility]  [facility]  [address] [place] [address]  [id number]  Phone:  [phone]  Patient Education     Patient Education    Albumin Human Solution for injection    Albumin Human, Albumin, Aggregated Solution for injection  Albumin Human Solution for injection  What is this medicine?  ALBUMIN (al BYOO min) is used to treat or prevent shock following serious injury, bleeding, surgery, or burns by increasing the volume of blood plasma. This medicine can also replace low blood protein.  This medicine may be used for other purposes; ask your health care provider or pharmacist if you have questions.  What should I tell my health care provider before I take this medicine?  They need to know if you have any of the following conditions:    anemia    heart disease    kidney disease    an unusual or allergic reaction to albumin, other medicines, foods, dyes, or preservatives    pregnant or trying to get pregnant    breast-feeding  How should I use this medicine?  This medicine is for infusion into a vein. It is given by a health-care professional in a hospital or clinic.  Talk to your pediatrician regarding the use of this medicine in children. While this drug may be prescribed for selected conditions, precautions do apply.  Overdosage: If you think you have taken too much of this medicine contact a poison  control center or emergency room at once.  NOTE: This medicine is only for you. Do not share this medicine with others.  What if I miss a dose?  This does not apply.  What may interact with this medicine?  Interactions are not expected.  This list may not describe all possible interactions. Give your health care provider a list of all the medicines, herbs, non-prescription drugs, or dietary supplements you use. Also tell them if you smoke, drink alcohol, or use illegal drugs. Some items may interact with your medicine.  What should I watch for while using this medicine?  Your condition will be closely monitored while you receive this medicine.  Some products are derived from human plasma, and there is a small risk that these products may contain certain types of virus or bacteria. All products are processed to kill most viruses and bacteria. If you have questions concerning the risk of infections, discuss them with your doctor or health care professional.  What side effects may I notice from receiving this medicine?  Side effects that you should report to your doctor or health care professional as soon as possible:    allergic reactions like skin rash, itching or hives, swelling of the face, lips, or tongue    breathing problems    changes in heartbeat    fever, chills    pain, redness or swelling at the injection site    signs of viral infection including fever, drowsiness, chills, runny nose followed in about 2 weeks by a rash and joint pain    tightness in the chest  Side effects that usually do not require medical attention (report to your doctor or health care professional if they continue or are bothersome):    increased salivation    nausea, vomiting  This list may not describe all possible side effects. Call your doctor for medical advice about side effects. You may report side effects to FDA at 1-118-FDA-0725.  Where should I keep my medicine?  This does not apply. You will not be given this medicine to  store at home.  NOTE:This sheet is a summary. It may not cover all possible information. If you have questions about this medicine, talk to your doctor, pharmacist, or health care provider. Copyright  2016 [employer]

## 2019-10-15 ENCOUNTER — TELEPHONE (OUTPATIENT)
Dept: TRANSPLANT | Facility: CLINIC | Age: 59
End: 2019-10-15

## 2019-10-15 RX ORDER — CYCLOSPORINE 100 MG/1
200 CAPSULE, LIQUID FILLED ORAL 2 TIMES DAILY
Status: CANCELLED | OUTPATIENT
Start: 2019-10-15

## 2019-10-15 NOTE — TELEPHONE ENCOUNTER
----- Message from Zina Dunham RN sent at 10/15/2019  8:10 AM CDT -----  Regarding: Gabriel follow up next week  HI,    Patient needs follow up next week with Dr Alford for post tx follow up. Also, lab appt before appt.     Please call patient.    Thank you  C

## 2019-10-18 ENCOUNTER — OFFICE VISIT (OUTPATIENT)
Dept: OTOLARYNGOLOGY | Facility: CLINIC | Age: 59
End: 2019-10-18
Payer: COMMERCIAL

## 2019-10-18 VITALS
HEART RATE: 101 BPM | RESPIRATION RATE: 15 BRPM | WEIGHT: 170.6 LBS | OXYGEN SATURATION: 95 % | HEIGHT: 61 IN | BODY MASS INDEX: 32.21 KG/M2

## 2019-10-18 DIAGNOSIS — K11.20 SIALADENITIS: ICD-10-CM

## 2019-10-18 DIAGNOSIS — R60.9 PAROTID SWELLING: Primary | ICD-10-CM

## 2019-10-18 ASSESSMENT — MIFFLIN-ST. JEOR: SCORE: 1286.22

## 2019-10-18 ASSESSMENT — PAIN SCALES - GENERAL: PAINLEVEL: SEVERE PAIN (6)

## 2019-10-18 NOTE — LETTER
"10/18/2019       RE: Nicci Pemberton  [address] [facility] [address]     Dear Colleague,    Thank you for referring your patient, Nicci Pemberton, to the [facility] EAR NOSE AND THROAT at [facility]. Please see a copy of my visit note below.    Dear Virgie Heart:    I had the pleasure of seeing Nicci Pemberton in follow-up today at the [facility] Ot[facility].     History of Present Illness:   Nicci Pemberton is a 59 year old woman who was referred urgently for evaluation of parotid mass on 10/9/2019. She has a history of a liver transplant about 2 months ago. She was recently admitted to the hospital and diagnosed with \"otitis media\" of the right ear. On review of notes and discussion with the patient, she had no signs of otitis media on exam other than blood in the canal. Around 10/5/2019 she developed pain and swelling in her right neck (tail of parotid) which was progressive in nature. She had a CT scan on the day of evaluation which showed what appeared to be a potential area of sialadenitis in the right parotid. She was started on augmentin, sialagogues, massage, warm compresses. She says today that she is significantly improved. She is no longer having the pain. She does say certain foods cause her to have discomfort extending to the right parotid. She has been doing the sialagogues, warm compresses and massage as instructed. She is still on the antibiotics. She has bilateral ear pressure and itching.      MEDICATIONS:     Current Outpatient Medications   Medication Sig Dispense Refill     acetaminophen-caffeine (EXCEDRIN TENSION HEADACHE) 500-65 MG TABS Take 1 tablet by mouth daily as needed (tension headache)       amoxicillin-clavulanate (AUGMENTIN) 875-125 MG tablet Take 1 tablet by mouth every 12 hours for 28 doses 28 tablet 0     ascorbic acid 500 MG TABS Take 1 tablet (500 mg) by mouth daily 30 tablet 0     aspirin (ASA) 81 MG tablet Take " 1 tablet (81 mg) by mouth daily 90 tablet 3     bumetanide (BUMEX) 1 MG tablet Take 1 tablet (1 mg) by mouth every other day 30 tablet 0     Cholecalciferol (VITAMIN D-3) 1000 units CAPS Take 1,000 Units by mouth 2 times daily 60 capsule 0     cycloSPORINE modified (GENERIC EQUIVALENT) 100 MG capsule Take 225 mg by mouth 2 times daily Patient has 25mg and 100mg capsules       dapsone (ACZONE) 100 MG tablet Take 1 tablet (100 mg) by mouth daily 30 tablet 0     famotidine (PEPCID) 20 MG tablet Take 1 tablet (20 mg) by mouth 2 times daily 30 tablet 1     levothyroxine (SYNTHROID/LEVOTHROID) 175 MCG tablet Take 1 tablet (175 mcg) by mouth daily 30 tablet 0     magnesium oxide (MAG-OX) 400 MG tablet Take 1 tablet (400 mg) by mouth daily 30 tablet 0     meclizine (ANTIVERT) 12.5 MG tablet Take 1 tablet (12.5 mg) by mouth 3 times daily as needed for dizziness 20 tablet 0     mycophenolic acid (GENERIC EQUIVALENT) 360 MG EC tablet Take 1 tablet (360 mg) by mouth 3 times daily 90 tablet 1     simethicone (MYLICON) 80 MG chewable tablet Take 1 tablet (80 mg) by mouth every 6 hours as needed for cramping       sodium chloride (OCEAN) 0.65 % nasal spray Spray 1 spray into both nostrils every hour as needed for congestion 1 Bottle 0     valGANciclovir (VALCYTE) 450 MG tablet Take 1 tablet (450 mg) by mouth daily 30 tablet 0     vancomycin (VANCOCIN HCL) 125 MG capsule Take 1 capsule (125 mg) by mouth 2 times daily 20 capsule 0     zinc sulfate (ZINCATE) 220 (50 Zn) MG capsule Take 1 capsule (220 mg) by mouth daily 30 capsule 0       ALLERGIES:    Allergies   Allergen Reactions     Food      Fruits with cores and pits  Apples     Sulfa Drugs Unknown     Swollen joints     Furosemide Rash       HABITS/SOCIAL HISTORY:     Former smoker    Social History     Socioeconomic History     Marital status:      Spouse name: Not on file     Number of children: Not on file     Years of education: Not on file     Highest  education level: Not on file   Occupational History     Not on file   Social Needs     Financial resource strain: Not on file     Food insecurity:     Worry: Not on file     Inability: Not on file     Transportation needs:     Medical: Not on file     Non-medical: Not on file   Tobacco Use     Smoking status: Former Smoker     Last attempt to quit: 2011     Years since quittin.8     Smokeless tobacco: Never Used     Tobacco comment: weekend smoker    Substance and Sexual Activity     Alcohol use: No     Drug use: No     Sexual activity: Not on file   Lifestyle     Physical activity:     Days per week: Not on file     Minutes per session: Not on file     Stress: Not on file   Relationships     Social connections:     Talks on phone: Not on file     Gets together: Not on file     Attends Gnosticism service: Not on file     Active member of club or organization: Not on file     Attends meetings of clubs or organizations: Not on file     Relationship status: Not on file     Intimate partner violence:     Fear of current or ex partner: Not on file     Emotionally abused: Not on file     Physically abused: Not on file     Forced sexual activity: Not on file   Other Topics Concern     Parent/sibling w/ CABG, MI or angioplasty before 65F 55M? Not Asked   Social History Narrative     Not on file       PAST MEDICAL HISTORY:   Past Medical History:   Diagnosis Date     Anemia      Cellulitis      Cirrhosis of liver (H) 2018     Heterozygous alpha 1-antitrypsin deficiency (H)      High hepatic iron concentration determined by biopsy of liver      Liver cirrhosis secondary to ANGULO (H)      Liver transplanted (H) 2019     Lymphedema      Osteoarthritis      SBP (spontaneous bacterial peritonitis) (H) 2019 in [place]        PAST SURGICAL HISTORY:   Past Surgical History:   Procedure Laterality Date     BENCH LIVER N/A 2019    Procedure: BACKBENCH PREPARATION, LIVER;  Surgeon: Mandeep Alford MD;   "Location: [facility] OR     COLONOSCOPY N/A 2018    Procedure: COLONOSCOPY;  colonoscopy;  Surgeon: Hugo Patel MD;  Location: [facility] OR     IR FEEDING TUBE PLACEMENT W MOHAN/MD  2019     IR FLUORO 0-1 HOUR  2019     IR LUMBAR PUNCTURE  2019     TRANSPLANT LIVER RECIPIENT  DONOR N/A 2019    Procedure: TRANSPLANT, LIVER, RECIPIENT,  DONOR;  Surgeon: Mandeep Alford MD;  Location: [facility] OR       FAMILY HISTORY:    Family History   Problem Relation Age of Onset     Cirrhosis No family hx of      Liver Cancer No family hx of        REVIEW OF SYSTEMS:  12 point ROS was negative other than the symptoms noted above in the HPI.  Patient Supplied Answers to Review of Systems  No flowsheet data found.      PHYSICAL EXAMINATION:   Pulse 101   Resp 15   Ht 1.549 m (5' 1\")   Wt 77.4 kg (170 lb 9.6 oz)   SpO2 95%   BMI 32.23 kg/m      Appearance:   normal; NAD, age-appropriate appearance, well-developed, normal habitus   Communication:   normal; communicates verbally, normal voice quality   Head/Face:   inspection -  Normal; no scars or visible lesions   Palpation - no longer tender along tail of right parotid   Salivary glands -  Palpable firmness, less than 1 cm in size along right tail of parotid, no longer visible, no underlying skin changes   Facial strength -  Normal and symmetric bilateral; H/B I/VI   Skin:  no skin lesions, no cellulitis   Ears:  auricle (AD) -  normal  EAC (AD) -  Small amount of dried blood on the floor of the canal  TM (AD) -  Normal, no effusion  auricle (AS) -  normal  EAC (AS) -  Normal, small amount of dried blood in canal along floor  TM (AS) -  Normal, no effusion  Normal clinical speech reception   Nose:  Ext. inspection -  Normal   Oral Cavity:  lips -  Normal mucosa, oral competence, and stoma size   Age-appropriate dentition, healthy gingival mucosa   Hard palate, buccal, floor of mouth mucosa with decrease in the previous xerostomia  Able to express " saliva from parotid duct on right - minimal   Neck: No visible mass or asymmetry   Normal range of motion   Lymphatic:  no abnormal nodes   Cardiovascular:  warm, pink, well-perfused extremities without swelling, tenderness, or edema   Respiratory:  Normal respiratory effort, no stridor   Neuro/Psych.:  mood/affect -  normal  mental status -  normal        RESULTS REVIEWED:       IMPRESSION AND PLAN:   Nicci Pemberton is a 59 year old woman with a history of a recent liver transplant who had a right parotid mass about 10 days ago with symptoms consistent with a sialadenitis. Things have drastically improved with conservative medical management with antibiotics, sialagogues, warm compresses, massage. She still has about a 1 cm area of firmness which is unclear if this is residual resolving disease, a lymph node, or a tail of parotid mass. I think it would be reasonable to get an U/S of the area next week when she is back at the hospital for other appointments. Pending the U/S results we will arrange the appropriate follow-up.    Thank you very much for the opportunity to participate in the care of your patient.      Faustina Berman MD, M.D.  Otolaryngology- Head & Neck Surgery      This note was dictated with voice recognition software and then edited. Please excuse any unintentional errors.         CC:  Virgie Heart, ROBERTO  [address] [facility] [address]

## 2019-10-18 NOTE — NURSING NOTE
"Chief Complaint   Patient presents with     RECHECK     Sialadenitis     Pulse 101   Resp 15   Ht 1.549 m (5' 1\")   Wt 77.4 kg (170 lb 9.6 oz)   SpO2 95%   BMI 32.23 kg/m      Javier Carlos CMA    " Refill request for Chantix 1mg continuing pack  Last office visit: 1/2/2018- patient due for appt  Last refill: 03/14/2018  Patient was given 5 months. Called and LVM to see if he took the full 5 months before.  Patient also needs a physical.

## 2019-10-18 NOTE — PATIENT INSTRUCTIONS
1. Please complete your Ultrasound scheduled for Monday at 2:00pm. You can check in early and try to get the exam earlier.   2. Please call the ENT clinic with any questions,concerns, new or worsening symptoms.    -Clinic number is [phone]   - [facility]'s direct line (Dr. Berman's nurse) [phone]

## 2019-10-19 NOTE — PROGRESS NOTES
"Dear Virgie Heart:    I had the pleasure of seeing Nicci Pemberton in follow-up today at the [facility] Otolaryng[facility].     History of Present Illness:   Nicci Pemberton is a 59 year old woman who was referred urgently for evaluation of parotid mass on 10/9/2019. She has a history of a liver transplant about 2 months ago. She was recently admitted to the hospital and diagnosed with \"otitis media\" of the right ear. On review of notes and discussion with the patient, she had no signs of otitis media on exam other than blood in the canal. Around 10/5/2019 she developed pain and swelling in her right neck (tail of parotid) which was progressive in nature. She had a CT scan on the day of evaluation which showed what appeared to be a potential area of sialadenitis in the right parotid. She was started on augmentin, sialagogues, massage, warm compresses. She says today that she is significantly improved. She is no longer having the pain. She does say certain foods cause her to have discomfort extending to the right parotid. She has been doing the sialagogues, warm compresses and massage as instructed. She is still on the antibiotics. She has bilateral ear pressure and itching.      MEDICATIONS:     Current Outpatient Medications   Medication Sig Dispense Refill     acetaminophen-caffeine (EXCEDRIN TENSION HEADACHE) 500-65 MG TABS Take 1 tablet by mouth daily as needed (tension headache)       amoxicillin-clavulanate (AUGMENTIN) 875-125 MG tablet Take 1 tablet by mouth every 12 hours for 28 doses 28 tablet 0     ascorbic acid 500 MG TABS Take 1 tablet (500 mg) by mouth daily 30 tablet 0     aspirin (ASA) 81 MG tablet Take 1 tablet (81 mg) by mouth daily 90 tablet 3     bumetanide (BUMEX) 1 MG tablet Take 1 tablet (1 mg) by mouth every other day 30 tablet 0     Cholecalciferol (VITAMIN D-3) 1000 units CAPS Take 1,000 Units by mouth 2 times daily 60 capsule 0     cycloSPORINE modified (GENERIC EQUIVALENT) " 100 MG capsule Take 225 mg by mouth 2 times daily Patient has 25mg and 100mg capsules       dapsone (ACZONE) 100 MG tablet Take 1 tablet (100 mg) by mouth daily 30 tablet 0     famotidine (PEPCID) 20 MG tablet Take 1 tablet (20 mg) by mouth 2 times daily 30 tablet 1     levothyroxine (SYNTHROID/LEVOTHROID) 175 MCG tablet Take 1 tablet (175 mcg) by mouth daily 30 tablet 0     magnesium oxide (MAG-OX) 400 MG tablet Take 1 tablet (400 mg) by mouth daily 30 tablet 0     meclizine (ANTIVERT) 12.5 MG tablet Take 1 tablet (12.5 mg) by mouth 3 times daily as needed for dizziness 20 tablet 0     mycophenolic acid (GENERIC EQUIVALENT) 360 MG EC tablet Take 1 tablet (360 mg) by mouth 3 times daily 90 tablet 1     simethicone (MYLICON) 80 MG chewable tablet Take 1 tablet (80 mg) by mouth every 6 hours as needed for cramping       sodium chloride (OCEAN) 0.65 % nasal spray Spray 1 spray into both nostrils every hour as needed for congestion 1 Bottle 0     valGANciclovir (VALCYTE) 450 MG tablet Take 1 tablet (450 mg) by mouth daily 30 tablet 0     vancomycin (VANCOCIN HCL) 125 MG capsule Take 1 capsule (125 mg) by mouth 2 times daily 20 capsule 0     zinc sulfate (ZINCATE) 220 (50 Zn) MG capsule Take 1 capsule (220 mg) by mouth daily 30 capsule 0       ALLERGIES:    Allergies   Allergen Reactions     Food      Fruits with cores and pits  Apples     Sulfa Drugs Unknown     Swollen joints     Furosemide Rash       HABITS/SOCIAL HISTORY:     Former smoker    Social History     Socioeconomic History     Marital status:      Spouse name: Not on file     Number of children: Not on file     Years of education: Not on file     Highest education level: Not on file   Occupational History     Not on file   Social Needs     Financial resource strain: Not on file     Food insecurity:     Worry: Not on file     Inability: Not on file     Transportation needs:     Medical: Not on file     Non-medical: Not on file   Tobacco Use      Smoking status: Former Smoker     Last attempt to quit: 2011     Years since quittin.8     Smokeless tobacco: Never Used     Tobacco comment: weekend smoker    Substance and Sexual Activity     Alcohol use: No     Drug use: No     Sexual activity: Not on file   Lifestyle     Physical activity:     Days per week: Not on file     Minutes per session: Not on file     Stress: Not on file   Relationships     Social connections:     Talks on phone: Not on file     Gets together: Not on file     Attends Latter-day service: Not on file     Active member of club or organization: Not on file     Attends meetings of clubs or organizations: Not on file     Relationship status: Not on file     Intimate partner violence:     Fear of current or ex partner: Not on file     Emotionally abused: Not on file     Physically abused: Not on file     Forced sexual activity: Not on file   Other Topics Concern     Parent/sibling w/ CABG, MI or angioplasty before 65F 55M? Not Asked   Social History Narrative     Not on file       PAST MEDICAL HISTORY:   Past Medical History:   Diagnosis Date     Anemia      Cellulitis      Cirrhosis of liver (H) 2018     Heterozygous alpha 1-antitrypsin deficiency (H)      High hepatic iron concentration determined by biopsy of liver      Liver cirrhosis secondary to ANGULO (H)      Liver transplanted (H) 2019     Lymphedema      Osteoarthritis      SBP (spontaneous bacterial peritonitis) (H) 2019 in [place]        PAST SURGICAL HISTORY:   Past Surgical History:   Procedure Laterality Date     BENCH LIVER N/A 2019    Procedure: BACKBENCH PREPARATION, LIVER;  Surgeon: Mandeep Alford MD;  Location: [facility] OR     COLONOSCOPY N/A 2018    Procedure: COLONOSCOPY;  colonoscopy;  Surgeon: Hugo Patel MD;  Location: [facility] OR     IR FEEDING TUBE PLACEMENT W MOHAN/MD  2019     IR FLUORO 0-1 HOUR  2019     IR LUMBAR PUNCTURE  2019     TRANSPLANT LIVER RECIPIENT   "DONOR N/A 2019    Procedure: TRANSPLANT, LIVER, RECIPIENT,  DONOR;  Surgeon: Mandeep Alford MD;  Location:  OR       FAMILY HISTORY:    Family History   Problem Relation Age of Onset     Cirrhosis No family hx of      Liver Cancer No family hx of        REVIEW OF SYSTEMS:  12 point ROS was negative other than the symptoms noted above in the HPI.  Patient Supplied Answers to Review of Systems  No flowsheet data found.      PHYSICAL EXAMINATION:   Pulse 101   Resp 15   Ht 1.549 m (5' 1\")   Wt 77.4 kg (170 lb 9.6 oz)   SpO2 95%   BMI 32.23 kg/m     Appearance:   normal; NAD, age-appropriate appearance, well-developed, normal habitus   Communication:   normal; communicates verbally, normal voice quality   Head/Face:   inspection -  Normal; no scars or visible lesions   Palpation - no longer tender along tail of right parotid   Salivary glands -  Palpable firmness, less than 1 cm in size along right tail of parotid, no longer visible, no underlying skin changes   Facial strength -  Normal and symmetric bilateral; H/B I/VI   Skin:  no skin lesions, no cellulitis   Ears:  auricle (AD) -  normal  EAC (AD) -  Small amount of dried blood on the floor of the canal  TM (AD) -  Normal, no effusion  auricle (AS) -  normal  EAC (AS) -  Normal, small amount of dried blood in canal along floor  TM (AS) -  Normal, no effusion  Normal clinical speech reception   Nose:  Ext. inspection -  Normal   Oral Cavity:  lips -  Normal mucosa, oral competence, and stoma size   Age-appropriate dentition, healthy gingival mucosa   Hard palate, buccal, floor of mouth mucosa with decrease in the previous xerostomia  Able to express saliva from parotid duct on right - minimal   Neck: No visible mass or asymmetry   Normal range of motion   Lymphatic:  no abnormal nodes   Cardiovascular:  warm, pink, well-perfused extremities without swelling, tenderness, or edema   Respiratory:  Normal respiratory effort, no stridor "   Neuro/Psych.:  mood/affect -  normal  mental status -  normal        RESULTS REVIEWED:       IMPRESSION AND PLAN:   Nicci Pemberton is a 59 year old woman with a history of a recent liver transplant who had a right parotid mass about 10 days ago with symptoms consistent with a sialadenitis. Things have drastically improved with conservative medical management with antibiotics, sialagogues, warm compresses, massage. She still has about a 1 cm area of firmness which is unclear if this is residual resolving disease, a lymph node, or a tail of parotid mass. I think it would be reasonable to get an U/S of the area next week when she is back at the hospital for other appointments. Pending the U/S results we will arrange the appropriate follow-up.    Thank you very much for the opportunity to participate in the care of your patient.      Faustina Berman MD, M.D.  Otolaryngology- Head & Neck Surgery      This note was dictated with voice recognition software and then edited. Please excuse any unintentional errors.         CC:  Virgie Heart, ROBERTO  [address] [facility] [address]

## 2019-10-21 ENCOUNTER — TELEPHONE (OUTPATIENT)
Dept: TRANSPLANT | Facility: CLINIC | Age: 59
End: 2019-10-21

## 2019-10-21 ENCOUNTER — OFFICE VISIT (OUTPATIENT)
Dept: TRANSPLANT | Facility: CLINIC | Age: 59
End: 2019-10-21
Payer: COMMERCIAL

## 2019-10-21 ENCOUNTER — ANCILLARY PROCEDURE (OUTPATIENT)
Dept: ULTRASOUND IMAGING | Facility: CLINIC | Age: 59
End: 2019-10-21
Attending: OTOLARYNGOLOGY
Payer: COMMERCIAL

## 2019-10-21 VITALS
OXYGEN SATURATION: 98 % | SYSTOLIC BLOOD PRESSURE: 100 MMHG | HEIGHT: 61 IN | BODY MASS INDEX: 32.13 KG/M2 | DIASTOLIC BLOOD PRESSURE: 68 MMHG | WEIGHT: 170.2 LBS | HEART RATE: 90 BPM | TEMPERATURE: 98.1 F

## 2019-10-21 DIAGNOSIS — D84.9 IMMUNOSUPPRESSION (H): ICD-10-CM

## 2019-10-21 DIAGNOSIS — Z94.4 LIVER REPLACED BY TRANSPLANT (H): ICD-10-CM

## 2019-10-21 DIAGNOSIS — Z79.899 LONG TERM CURRENT USE OF IMMUNOSUPPRESSIVE DRUG: ICD-10-CM

## 2019-10-21 DIAGNOSIS — R60.9 PAROTID SWELLING: ICD-10-CM

## 2019-10-21 DIAGNOSIS — Z94.4 STATUS POST LIVER TRANSPLANTATION (H): Primary | ICD-10-CM

## 2019-10-21 LAB
ALBUMIN SERPL-MCNC: 3.5 G/DL (ref 3.4–5)
ALP SERPL-CCNC: 151 U/L (ref 40–150)
ALT SERPL W P-5'-P-CCNC: 31 U/L (ref 0–50)
ANION GAP SERPL CALCULATED.3IONS-SCNC: 5 MMOL/L (ref 3–14)
AST SERPL W P-5'-P-CCNC: 35 U/L (ref 0–45)
BILIRUB DIRECT SERPL-MCNC: 0.8 MG/DL (ref 0–0.2)
BILIRUB SERPL-MCNC: 1.8 MG/DL (ref 0.2–1.3)
BUN SERPL-MCNC: 41 MG/DL (ref 7–30)
CALCIUM SERPL-MCNC: 9.9 MG/DL (ref 8.5–10.1)
CHLORIDE SERPL-SCNC: 101 MMOL/L (ref 94–109)
CO2 SERPL-SCNC: 31 MMOL/L (ref 20–32)
CREAT SERPL-MCNC: 1.07 MG/DL (ref 0.52–1.04)
CYCLOSPORINE BLD LC/MS/MS-MCNC: 333 UG/L (ref 50–400)
ERYTHROCYTE [DISTWIDTH] IN BLOOD BY AUTOMATED COUNT: 14.1 % (ref 10–15)
GFR SERPL CREATININE-BSD FRML MDRD: 57 ML/MIN/{1.73_M2}
GLUCOSE SERPL-MCNC: 105 MG/DL (ref 70–99)
HCT VFR BLD AUTO: 32 % (ref 35–47)
HGB BLD-MCNC: 10.2 G/DL (ref 11.7–15.7)
MAGNESIUM SERPL-MCNC: 1.8 MG/DL (ref 1.6–2.3)
MCH RBC QN AUTO: 34.6 PG (ref 26.5–33)
MCHC RBC AUTO-ENTMCNC: 31.9 G/DL (ref 31.5–36.5)
MCV RBC AUTO: 109 FL (ref 78–100)
PHOSPHATE SERPL-MCNC: 4.1 MG/DL (ref 2.5–4.5)
PLATELET # BLD AUTO: 133 10E9/L (ref 150–450)
POTASSIUM SERPL-SCNC: 5 MMOL/L (ref 3.4–5.3)
PROT SERPL-MCNC: 7.3 G/DL (ref 6.8–8.8)
RBC # BLD AUTO: 2.95 10E12/L (ref 3.8–5.2)
SODIUM SERPL-SCNC: 137 MMOL/L (ref 133–144)
TME LAST DOSE: NORMAL H
WBC # BLD AUTO: 3.5 10E9/L (ref 4–11)

## 2019-10-21 PROCEDURE — G0008 ADMIN INFLUENZA VIRUS VAC: HCPCS | Mod: ZF

## 2019-10-21 PROCEDURE — 90662 IIV NO PRSV INCREASED AG IM: CPT | Mod: ZF | Performed by: TRANSPLANT SURGERY

## 2019-10-21 PROCEDURE — 85027 COMPLETE CBC AUTOMATED: CPT | Performed by: TRANSPLANT SURGERY

## 2019-10-21 PROCEDURE — 80158 DRUG ASSAY CYCLOSPORINE: CPT | Performed by: TRANSPLANT SURGERY

## 2019-10-21 PROCEDURE — 36415 COLL VENOUS BLD VENIPUNCTURE: CPT | Performed by: TRANSPLANT SURGERY

## 2019-10-21 PROCEDURE — G0463 HOSPITAL OUTPT CLINIC VISIT: HCPCS | Mod: ZF

## 2019-10-21 PROCEDURE — 84100 ASSAY OF PHOSPHORUS: CPT | Performed by: TRANSPLANT SURGERY

## 2019-10-21 PROCEDURE — 25000128 H RX IP 250 OP 636: Mod: ZF | Performed by: TRANSPLANT SURGERY

## 2019-10-21 PROCEDURE — 83735 ASSAY OF MAGNESIUM: CPT | Performed by: TRANSPLANT SURGERY

## 2019-10-21 PROCEDURE — 80076 HEPATIC FUNCTION PANEL: CPT | Performed by: TRANSPLANT SURGERY

## 2019-10-21 PROCEDURE — 80048 BASIC METABOLIC PNL TOTAL CA: CPT | Performed by: TRANSPLANT SURGERY

## 2019-10-21 RX ORDER — CYCLOSPORINE 100 MG/1
100 CAPSULE, LIQUID FILLED ORAL 2 TIMES DAILY
Qty: 60 CAPSULE | Refills: 3 | Status: SHIPPED | OUTPATIENT
Start: 2019-10-21 | End: 2019-10-29

## 2019-10-21 RX ORDER — CYCLOSPORINE 25 MG/1
75 CAPSULE, LIQUID FILLED ORAL 2 TIMES DAILY
Qty: 180 CAPSULE | Refills: 3 | Status: SHIPPED | OUTPATIENT
Start: 2019-10-21 | End: 2019-10-29

## 2019-10-21 RX ADMIN — INFLUENZA A VIRUS A/MICHIGAN/45/2015 X-275 (H1N1) ANTIGEN (FORMALDEHYDE INACTIVATED), INFLUENZA A VIRUS A/SINGAPORE/INFIMH-16-0019/2016 IVR-186 (H3N2) ANTIGEN (FORMALDEHYDE INACTIVATED), AND INFLUENZA B VIRUS B/MARYLAND/15/2016 BX-69A (A B/COLORADO/6/2017-LIKE VIRUS) ANTIGEN (FORMALDEHYDE INACTIVATED) 0.5 ML: 60; 60; 60 INJECTION, SUSPENSION INTRAMUSCULAR at 10:33

## 2019-10-21 ASSESSMENT — PAIN SCALES - GENERAL: PAINLEVEL: MILD PAIN (3)

## 2019-10-21 ASSESSMENT — MIFFLIN-ST. JEOR: SCORE: 1284.4

## 2019-10-21 NOTE — PROGRESS NOTES
HPI      ROS      Physical Exam    Transplant Surgery -OUTPATIENT IMMUNOSUPPRESSION PROGRESS NOTE    Date of Visit: 10/21/2019    Transplants:  8/18/2019 (Liver); Postoperative day:  64  ASSESMENT AND PLAN:  1.Graft Function: liver tests pending, will need to review them  2.Immunosuppression Management: keep cyclosporine levels at 200-300 ng/dL and myfortic 360 tid  3.Hypertension:ok  4.Renal Function: ok  5.Lab frequency: weekly  6.Other:  The neck swelling is better  Feels full after meals;  Will need need endscopy    Date: October 21, 2019    Transplant:  [x]                             Liver [x]                              Kidney []                             Pancreas []                              Other:             Chief Complaint:  Has fullness after meals   History of Present Illness:  Post liver transplant    Patient Active Problem List   Diagnosis     Cirrhosis of liver (H)     Liver cirrhosis secondary to ANGULO (H)     Heterozygous alpha 1-antitrypsin deficiency (H)     High hepatic iron concentration determined by biopsy of liver     SBP (spontaneous bacterial peritonitis) (H)     Acute kidney failure, unspecified (H)     Status post liver transplantation (H)     Epistaxis     Anemia due to blood loss, acute     C. difficile diarrhea     Metabolic encephalopathy     GAMAL (acute kidney injury) (H)     Steroid-induced hyperglycemia     Immunosuppressed status (H)     Hyperkalemia     Severe malnutrition (H)     S/P liver transplant (H)     Vertigo     Otitis media     Hypoxia     Dehydration     SOCIAL /FAMILY HISTORY: [x]                  No recent change    Past Medical History:   Diagnosis Date     Anemia      Cellulitis      Cirrhosis of liver (H) 6/18/2018     Heterozygous alpha 1-antitrypsin deficiency (H)      High hepatic iron concentration determined by biopsy of liver      Liver cirrhosis secondary to ANGULO (H)      Liver transplanted (H) 08/18/2019     Lymphedema      Osteoarthritis      SBP  (spontaneous bacterial peritonitis) (H) 2019 in [place]     Past Surgical History:   Procedure Laterality Date     BENCH LIVER N/A 2019    Procedure: BACKBENCH PREPARATION, LIVER;  Surgeon: Mandeep Alford MD;  Location: [facility] OR     COLONOSCOPY N/A 2018    Procedure: COLONOSCOPY;  colonoscopy;  Surgeon: Hugo Patel MD;  [facility]     IR FEEDING TUBE PLACEMENT W FLUORO/MD  2019     IR FLUORO 0-1 HOUR  2019     IR LUMBAR PUNCTURE  2019     TRANSPLANT LIVER RECIPIENT  DONOR N/A 2019    Procedure: TRANSPLANT, LIVER, RECIPIENT,  DONOR;  Surgeon: Mandeep Alford MD;  Location: [facility] OR     Social History     Socioeconomic History     Marital status:      Spouse name: Not on file     Number of children: Not on file     Years of education: Not on file     Highest education level: Not on file   Occupational History     Not on file   Social Needs     Financial resource strain: Not on file     Food insecurity:     Worry: Not on file     Inability: Not on file     Transportation needs:     Medical: Not on file     Non-medical: Not on file   Tobacco Use     Smoking status: Former Smoker     Last attempt to quit: 2011     Years since quittin.8     Smokeless tobacco: Never Used     Tobacco comment: weekend smoker    Substance and Sexual Activity     Alcohol use: No     Drug use: No     Sexual activity: Not on file   Lifestyle     Physical activity:     Days per week: Not on file     Minutes per session: Not on file     Stress: Not on file   Relationships     Social connections:     Talks on phone: Not on file     Gets together: Not on file     Attends Mandaen service: Not on file     Active member of club or organization: Not on file     Attends meetings of clubs or organizations: Not on file     Relationship status: Not on file     Intimate partner violence:     Fear of current or ex partner: Not on file     Emotionally abused: Not on file      Physically abused: Not on file     Forced sexual activity: Not on file   Other Topics Concern     Parent/sibling w/ CABG, MI or angioplasty before 65F 55M? Not Asked   Social History Narrative     Not on file     Prescription Medications as of 10/21/2019       Rx Number Disp Refills Start End Last Dispensed Date Next Fill Date Owning Pharmacy    acetaminophen-caffeine (EXCEDRIN TENSION HEADACHE) 500-65 MG TABS    9/30/2019        Sig: Take 1 tablet by mouth daily as needed (tension headache)    Class: OTC    Route: Oral    amoxicillin-clavulanate (AUGMENTIN) 875-125 MG tablet  28 tablet 0 10/9/2019 10/23/2019   [address]    Sig: Take 1 tablet by mouth every 12 hours for 28 doses    Class: E-Prescribe    Route: Oral    ascorbic acid 500 MG TABS  30 tablet 0 9/30/2019    [place] [address]    Sig: Take 1 tablet (500 mg) by mouth daily    Class: E-Prescribe    Route: Oral    aspirin (ASA) 81 MG tablet  90 tablet 3 10/14/2019    [address]    Sig: Take 1 tablet (81 mg) by mouth daily    Class: E-Prescribe    Route: Oral    bumetanide (BUMEX) 1 MG tablet  30 tablet 0 10/7/2019    [address]    Sig: Take 1 tablet (1 mg) by mouth every other day    Class: E-Prescribe    Route: Oral    Cholecalciferol (VITAMIN D-3) 1000 units CAPS  60 capsule 0 9/30/2019    [place] [address]    Sig: Take 1,000 Units by mouth 2 times daily    Class: E-Prescribe    Route: Oral    cycloSPORINE modified (GENERIC EQUIVALENT) 100 MG capsule            Sig: Take 225 mg by mouth 2 times daily Patient has 25mg and 100mg capsules    Class: Historical    Route: Oral    dapsone (ACZONE) 100 MG tablet  30 tablet 0 9/30/2019    [facility]  [address]    Sig: Take 1 tablet (100 mg) by mouth daily    Class: E-Prescribe    Route: Oral    famotidine (PEPCID) 20 MG tablet  30 tablet 1 10/14/2019    [address]    Sig: Take 1 tablet (20 mg) by mouth 2 times daily    Class: E-Prescribe    Route: Oral    levothyroxine (SYNTHROID/LEVOTHROID) 175 MCG tablet  30 tablet 0 9/30/2019    [address]    Sig: Take 1 tablet (175 mcg) by mouth daily    Class: E-Prescribe    Route: Oral    magnesium oxide (MAG-OX) 400 MG tablet  30 tablet 0 10/1/2019    [address]    Sig: Take 1 tablet (400 mg) by mouth daily    Class: E-Prescribe    Route: Oral    meclizine (ANTIVERT) 12.5 MG tablet  20 tablet 0 10/6/2019    [address]    Sig: Take 1 tablet (12.5 mg) by mouth 3 times daily as needed for dizziness    Class: E-Prescribe    Route: Oral    mycophenolic acid (GENERIC EQUIVALENT) 360 MG EC tablet  90 tablet 1 10/14/2019    [address]    Sig: Take 1 tablet (360 mg) by mouth 3 times daily    Class: E-Prescribe    Notes to Pharmacy: Transplant Date: 8/18/2019 (Liver) Discharge Date: 9/23/2019 ICD10: Liver replaced by transplant -Z94.4    Route: Oral    simethicone (MYLICON) 80 MG chewable tablet    9/30/2019        Sig: Take 1 tablet (80 mg) by mouth every 6 hours as needed for cramping    Class: OTC    Route: Oral    sodium chloride (OCEAN) 0.65 % nasal spray  1 Bottle 0 10/6/2019    [address]    Sig: [place] 1 spray into both nostrils every hour as needed for congestion    Class: E-Prescribe    Route: Both Nostrils    valGANciclovir (VALCYTE) 450 MG tablet  30 tablet 0 9/30/2019     "[place], [place] - [address]    Sig: Take 1 tablet (450 mg) by mouth daily    Class: E-Prescribe    Route: Oral    vancomycin (VANCOCIN HCL) 125 MG capsule  20 capsule 0 10/6/2019    [place], [place] - [address] [facility] [address]    Sig: Take 1 capsule (125 mg) by mouth 2 times daily    Class: E-Prescribe    Route: Oral    zinc sulfate (ZINCATE) 220 (50 Zn) MG capsule  30 capsule 0 9/30/2019    [place], [place] - [address]    Sig: Take 1 capsule (220 mg) by mouth daily    Class: E-Prescribe    Route: Oral        Food; Sulfa drugs; and Furosemide   REVIEW OF SYSTEMS (check box if normal)  [x]               GENERAL  [x]                 PULMONARY [x]                GENITOURINARY  [x]                CNS                 [x]                 CARDIAC  [x]                 ENDOCRINE  [x]                EARS,NOSE,THROAT [x]                 GASTROINTESTINAL [x]                 NEUROLOGIC    [x]                MUSCLOSKELTAL  [x]                  HEMATOLOGY      PHYSICAL EXAM (check box if normal)/68   Pulse 90   Temp 98.1  F (36.7  C) (Oral)   Ht 1.549 m (5' 1\")   Wt 77.2 kg (170 lb 3.2 oz)   SpO2 98%   BMI 32.16 kg/m          [x]            GENERAL:    [x]       EYES:  ICTERIC   []        YES  []                    NO  [x]           EXTREMITIES: Clubbing []       Y     [x]           N    [x]           EARS, NOSE, THROAT: Membranes Moist    YES   [x]                   NO []                  [x]           LUNGS:  CLEAR    YES       [x]                  NO    []                                [x]           SKIN: Jaundice           YES       []                  NO    [x]                   Rash: YES       []                  NO    [x]                                     [x]             HEART: Regular Rate          YES       [x]                  NO    []                   Incision Clean:  YES       [x]                  NO    []     "                            [x]                    ABDOMEN: Abdomen soft. Organomegaly YES       []                  NO    [x]                       [x]                    NEUROLOGICAL:  Nonfocal  YES       [x]                  NO    []                       [x]                    Hernia YES       []                  NO    [x]                   PSYCHIATRIC:  Appropriate  YES       [x]                  NO    []                       OTHER:                                                                                                   PAIN SCALE:: 3

## 2019-10-21 NOTE — TELEPHONE ENCOUNTER
Pt's CSA level elevated. Pt will decrease to 175 mg every 12 hours. Pt repeated. Pt's  counted 25 mg capsules and she has enough for 4 days. Spoke with tere and they will contact patient for shipment.

## 2019-10-21 NOTE — NURSING NOTE
"Chief Complaint   Patient presents with     Surgical Followup     Liver tx follow up       Blood pressure 100/68, pulse 90, temperature 98.1  F (36.7  C), temperature source Oral, height 1.549 m (5' 1\"), weight 77.2 kg (170 lb 3.2 oz), SpO2 98 %, not currently breastfeeding.    Dawna Powers CMA on 10/21/2019 at 9:20 AM    "

## 2019-10-22 ENCOUNTER — MEDICAL CORRESPONDENCE (OUTPATIENT)
Dept: HEALTH INFORMATION MANAGEMENT | Facility: CLINIC | Age: 59
End: 2019-10-22

## 2019-10-23 ENCOUNTER — PATIENT OUTREACH (OUTPATIENT)
Dept: OTOLARYNGOLOGY | Facility: CLINIC | Age: 59
End: 2019-10-23

## 2019-10-23 NOTE — PROGRESS NOTES
Called and left message for patient to return call to discuss neck ultrasound results. Left direct line for return call.     Merlyn Carroll, RN, BSN

## 2019-10-23 NOTE — PROGRESS NOTES
Called patient with the following Ultrasound results:      Impression:  1. There is a heterogeneous mass with internal vascularity in the  inferior portion of superficial segment of right parotid gland  measuring 1.2 x 0.7 x 1.2 cm corresponding to enhancing mass  identified on the CT dated 10/9/2019. Consider further evaluation by  FNA.     2. There is a 6 mm nodule in the right thyroid lobe, likely a benign  finding.     3. Small thyroid gland with associated mild heterogeneity, in keeping  with sequela of thyroiditis.    Dr. Berman would like for patient to return to clinic for FNA. She will return to clinic for FNA

## 2019-10-25 ENCOUNTER — TELEPHONE (OUTPATIENT)
Dept: GASTROENTEROLOGY | Facility: CLINIC | Age: 59
End: 2019-10-25

## 2019-10-25 NOTE — TELEPHONE ENCOUNTER
Patient Name: Nicci Pemberton   : 1960  MRN: [id number]       : [x] N/A       VM (home unavailable) cell (available) with information needed to complete pre-assessment call.  Request pt contact Endoscopy Pre-assessment RN to complete upcoming procedure information.  . Telephone call-back number provided.    Mae Baron, RN, RN  [facility]    Additional Information regarding appointment:      Patient scheduled for:  [x] EGD      Indication for procedure.  [x] Status post liver transplantation     Sedation Type: [x] Conscious Sedation       Procedure Provider:  Manoj      Referring Provider. Wale Thurston (PCP); Mandeep Alford    Arrival time verified: Thur / 10.31.19 / 0945    Facility location verified:   [x][facility] - [address] [facility], 1st Floor, [address]    Pt meets medical necessity for outpatient procedure in hospital Endoscopy Unit:     [x] N/A for this Payor (non-BCBS)      Prep Type:   [x]NPO /p 0500, No solid food /p 2200 the night before    Anticoagulants or blood thinners:  [x] ASA 81mg  - may continue               Electronic implanted devices: [x] No      H&P / Pre op physical completed: [x] N/A    Additional Information: Liver tx 19    MyChart unavailable  _______________________________________________

## 2019-10-28 DIAGNOSIS — Z94.4 S/P LIVER TRANSPLANT (H): ICD-10-CM

## 2019-10-28 DIAGNOSIS — E43 SEVERE MALNUTRITION (H): ICD-10-CM

## 2019-10-28 DIAGNOSIS — Z94.4 STATUS POST LIVER TRANSPLANTATION (H): ICD-10-CM

## 2019-10-28 LAB
ALBUMIN SERPL-MCNC: 3.6 G/DL (ref 3.4–5)
ALP SERPL-CCNC: 123 U/L (ref 40–150)
ALT SERPL W P-5'-P-CCNC: 18 U/L (ref 0–50)
ANION GAP SERPL CALCULATED.3IONS-SCNC: 7 MMOL/L (ref 3–14)
AST SERPL W P-5'-P-CCNC: 26 U/L (ref 0–45)
BILIRUB DIRECT SERPL-MCNC: 0.7 MG/DL (ref 0–0.2)
BILIRUB SERPL-MCNC: 1.9 MG/DL (ref 0.2–1.3)
BUN SERPL-MCNC: 44 MG/DL (ref 7–30)
CALCIUM SERPL-MCNC: 10 MG/DL (ref 8.5–10.1)
CHLORIDE SERPL-SCNC: 104 MMOL/L (ref 94–109)
CO2 SERPL-SCNC: 28 MMOL/L (ref 20–32)
CREAT SERPL-MCNC: 0.94 MG/DL (ref 0.52–1.04)
ERYTHROCYTE [DISTWIDTH] IN BLOOD BY AUTOMATED COUNT: 13.2 % (ref 10–15)
GFR SERPL CREATININE-BSD FRML MDRD: 66 ML/MIN/{1.73_M2}
GLUCOSE SERPL-MCNC: 90 MG/DL (ref 70–99)
HCT VFR BLD AUTO: 31.9 % (ref 35–47)
HGB BLD-MCNC: 10.1 G/DL (ref 11.7–15.7)
MAGNESIUM SERPL-MCNC: 1.7 MG/DL (ref 1.6–2.3)
MCH RBC QN AUTO: 33.7 PG (ref 26.5–33)
MCHC RBC AUTO-ENTMCNC: 31.7 G/DL (ref 31.5–36.5)
MCV RBC AUTO: 106 FL (ref 78–100)
PHOSPHATE SERPL-MCNC: 4.4 MG/DL (ref 2.5–4.5)
PLATELET # BLD AUTO: 146 10E9/L (ref 150–450)
POTASSIUM SERPL-SCNC: 4.7 MMOL/L (ref 3.4–5.3)
PROT SERPL-MCNC: 7.1 G/DL (ref 6.8–8.8)
RBC # BLD AUTO: 3 10E12/L (ref 3.8–5.2)
SODIUM SERPL-SCNC: 139 MMOL/L (ref 133–144)
WBC # BLD AUTO: 3 10E9/L (ref 4–11)

## 2019-10-28 PROCEDURE — 84100 ASSAY OF PHOSPHORUS: CPT | Performed by: TRANSPLANT SURGERY

## 2019-10-28 PROCEDURE — 80158 DRUG ASSAY CYCLOSPORINE: CPT | Performed by: TRANSPLANT SURGERY

## 2019-10-28 PROCEDURE — 80076 HEPATIC FUNCTION PANEL: CPT | Performed by: TRANSPLANT SURGERY

## 2019-10-28 PROCEDURE — 80048 BASIC METABOLIC PNL TOTAL CA: CPT | Performed by: TRANSPLANT SURGERY

## 2019-10-28 PROCEDURE — 83735 ASSAY OF MAGNESIUM: CPT | Performed by: TRANSPLANT SURGERY

## 2019-10-28 PROCEDURE — 80197 ASSAY OF TACROLIMUS: CPT | Performed by: TRANSPLANT SURGERY

## 2019-10-28 PROCEDURE — 85027 COMPLETE CBC AUTOMATED: CPT | Performed by: TRANSPLANT SURGERY

## 2019-10-28 NOTE — TELEPHONE ENCOUNTER
Patient Call: Medication Refill and Question about pre-op EGD scheduled 10/31/19 hold meds or take meds    When will the patient be out of this medication?: Less than 3 days (Route high priority)

## 2019-10-29 ENCOUNTER — TELEPHONE (OUTPATIENT)
Dept: GASTROENTEROLOGY | Facility: CLINIC | Age: 59
End: 2019-10-29

## 2019-10-29 ENCOUNTER — TELEPHONE (OUTPATIENT)
Dept: TRANSPLANT | Facility: CLINIC | Age: 59
End: 2019-10-29

## 2019-10-29 DIAGNOSIS — Z94.4 S/P LIVER TRANSPLANT (H): Primary | ICD-10-CM

## 2019-10-29 LAB
CYCLOSPORINE BLD LC/MS/MS-MCNC: 273 UG/L (ref 50–400)
TACROLIMUS BLD-MCNC: <3 UG/L (ref 5–15)
TME LAST DOSE: ABNORMAL H
TME LAST DOSE: NORMAL H

## 2019-10-29 RX ORDER — VALGANCICLOVIR 450 MG/1
450 TABLET, FILM COATED ORAL DAILY
Qty: 90 TABLET | Refills: 3 | Status: SHIPPED | OUTPATIENT
Start: 2019-10-29 | End: 2019-11-14

## 2019-10-29 RX ORDER — MAGNESIUM OXIDE 400 MG/1
400 TABLET ORAL DAILY
Qty: 90 TABLET | Refills: 3 | Status: SHIPPED | OUTPATIENT
Start: 2019-10-29 | End: 2019-11-18

## 2019-10-29 RX ORDER — CYCLOSPORINE 25 MG/1
75 CAPSULE, LIQUID FILLED ORAL 2 TIMES DAILY
Qty: 540 CAPSULE | Refills: 3 | Status: SHIPPED | OUTPATIENT
Start: 2019-10-29 | End: 2019-11-04

## 2019-10-29 RX ORDER — MYCOPHENOLIC ACID 360 MG/1
360 TABLET, DELAYED RELEASE ORAL 3 TIMES DAILY
Qty: 270 TABLET | Refills: 3 | Status: SHIPPED | OUTPATIENT
Start: 2019-10-29 | End: 2019-11-14

## 2019-10-29 RX ORDER — DAPSONE 100 MG/1
100 TABLET ORAL DAILY
Qty: 90 TABLET | Refills: 1 | Status: SHIPPED | OUTPATIENT
Start: 2019-10-29 | End: 2020-02-18

## 2019-10-29 RX ORDER — CYCLOSPORINE 100 MG/1
100 CAPSULE, LIQUID FILLED ORAL 2 TIMES DAILY
Qty: 180 CAPSULE | Refills: 3 | Status: SHIPPED | OUTPATIENT
Start: 2019-10-29 | End: 2019-11-04

## 2019-10-29 NOTE — TELEPHONE ENCOUNTER
Writer spoke to pt and informed her that her med list has been updated by Zina WARD. Writer placed the letters PCP next to meds that PCP should fill out with this wednesdays appt. Med list was then emailed to pt. Pt had requested that tx meds get filled thru [facility]. E-scripts sent.

## 2019-10-29 NOTE — PROGRESS NOTES
"Dear Virgie Heart:    I had the pleasure of seeing Nicci Pemberton in follow-up today at the [facility].     History of Present Illness:   Nicci Pemberton is a 59 year old woman who was referred urgently for evaluation of parotid mass on 10/9/2019. She has a history of a liver transplant about 2 months ago. She was recently admitted to the hospital and diagnosed with \"otitis media\" of the right ear. On review of notes and discussion with the patient, she had no signs of otitis media on exam other than blood in the canal. Around 10/5/2019 she developed pain and swelling in her right neck (tail of parotid) which was progressive in nature. She had a CT scan on the day of evaluation which showed what appeared to be a potential area of sialadenitis in the right parotid. She was started on augmentin, sialagogues, massage, warm compresses.    Interval history:  She comes in today for follow-up. She was last seen 10/23/2019. At that time she was significantly improved with no further pain. She did have a residual mass that was much smaller in size. An U/S was obtained which demonstrated a 1.2 x 0.7 x 1.2 cm right parotid mass. She comes in today for an FNA now that the acute infection has resolved. She says that her symptoms continue to improve. She is working to ensure adequate hydration. She thinks the mass may be smaller in size.       MEDICATIONS:     Current Outpatient Medications   Medication Sig Dispense Refill     acetaminophen-caffeine (EXCEDRIN TENSION HEADACHE) 500-65 MG TABS Take 1 tablet by mouth daily as needed (tension headache)       ascorbic acid 500 MG TABS Take 1 tablet (500 mg) by mouth daily 30 tablet 0     aspirin (ASA) 81 MG tablet Take 1 tablet (81 mg) by mouth daily 90 tablet 3     Cholecalciferol (VITAMIN D-3) 1000 units CAPS Take 1,000 Units by mouth 2 times daily 60 capsule 0     cycloSPORINE modified (GENERIC EQUIVALENT) 100 MG capsule Take 1 capsule (100 mg) by " mouth 2 times daily *175 mg every 12 hours 180 capsule 3     cycloSPORINE modified (GENERIC EQUIVALENT) 25 MG capsule Take 3 capsules (75 mg) by mouth 2 times daily *total dose of 175 mg twice a day 540 capsule 3     dapsone (ACZONE) 100 MG tablet Take 1 tablet (100 mg) by mouth daily 90 tablet 1     famotidine (PEPCID) 20 MG tablet Take 1 tablet (20 mg) by mouth 2 times daily 30 tablet 1     levothyroxine (SYNTHROID/LEVOTHROID) 175 MCG tablet Take 1 tablet (175 mcg) by mouth daily 30 tablet 0     magnesium oxide (MAG-OX) 400 MG tablet Take 1 tablet (400 mg) by mouth daily 90 tablet 3     meclizine (ANTIVERT) 12.5 MG tablet Take 1 tablet (12.5 mg) by mouth 3 times daily as needed for dizziness 20 tablet 0     mycophenolic acid (GENERIC EQUIVALENT) 360 MG EC tablet Take 1 tablet (360 mg) by mouth 3 times daily 270 tablet 3     simethicone (MYLICON) 80 MG chewable tablet Take 1 tablet (80 mg) by mouth every 6 hours as needed for cramping       sodium chloride (OCEAN) 0.65 % nasal spray Spray 1 spray into both nostrils every hour as needed for congestion 1 Bottle 0     valGANciclovir (VALCYTE) 450 MG tablet Take 1 tablet (450 mg) by mouth daily 90 tablet 3     zinc sulfate (ZINCATE) 220 (50 Zn) MG capsule Take 1 capsule (220 mg) by mouth daily 30 capsule 0       ALLERGIES:    Allergies   Allergen Reactions     Food      Fruits with cores and pits  Apples     Sulfa Drugs Unknown     Swollen joints     Furosemide Rash       HABITS/SOCIAL HISTORY:     Former smoker    Social History     Socioeconomic History     Marital status:      Spouse name: Not on file     Number of children: Not on file     Years of education: Not on file     Highest education level: Not on file   Occupational History     Not on file   Social Needs     Financial resource strain: Not on file     Food insecurity:     Worry: Not on file     Inability: Not on file     Transportation needs:     Medical: Not on file     Non-medical: Not on  file   Tobacco Use     Smoking status: Former Smoker     Last attempt to quit: 2011     Years since quittin.8     Smokeless tobacco: Never Used     Tobacco comment: weekend smoker    Substance and Sexual Activity     Alcohol use: No     Drug use: No     Sexual activity: Not on file   Lifestyle     Physical activity:     Days per week: Not on file     Minutes per session: Not on file     Stress: Not on file   Relationships     Social connections:     Talks on phone: Not on file     Gets together: Not on file     Attends Yazdanism service: Not on file     Active member of club or organization: Not on file     Attends meetings of clubs or organizations: Not on file     Relationship status: Not on file     Intimate partner violence:     Fear of current or ex partner: Not on file     Emotionally abused: Not on file     Physically abused: Not on file     Forced sexual activity: Not on file   Other Topics Concern     Parent/sibling w/ CABG, MI or angioplasty before 65F 55M? Not Asked   Social History Narrative     Not on file       PAST MEDICAL HISTORY:   Past Medical History:   Diagnosis Date     Anemia      Cellulitis      Cirrhosis of liver (H) 2018     Heterozygous alpha 1-antitrypsin deficiency (H)      High hepatic iron concentration determined by biopsy of liver      Liver cirrhosis secondary to ANGULO (H)      Liver transplanted (H) 2019     Lymphedema      Osteoarthritis      SBP (spontaneous bacterial peritonitis) (H) 2019 in [place]        PAST SURGICAL HISTORY:   Past Surgical History:   Procedure Laterality Date     BENCH LIVER N/A 2019    Procedure: BACKBENCH PREPARATION, LIVER;  Surgeon: Mandeep Alford MD;  Location: [facility] OR     COLONOSCOPY N/A 2018    Procedure: COLONOSCOPY;  colonoscopy;  Surgeon: Hugo Patel MD;  [facility]: [facility] OR     IR FEEDING TUBE PLACEMENT W MOHAN/MD  2019     IR FLUORO 0-1 HOUR  2019     IR LUMBAR PUNCTURE  2019     TRANSPLANT  "LIVER RECIPIENT  DONOR N/A 2019    Procedure: TRANSPLANT, LIVER, RECIPIENT,  DONOR;  Surgeon: Mandeep Alford MD;  Location:  OR       FAMILY HISTORY:    Family History   Problem Relation Age of Onset     Cirrhosis No family hx of      Liver Cancer No family hx of        REVIEW OF SYSTEMS:  12 point ROS was negative other than the symptoms noted above in the HPI.  Patient Supplied Answers to Review of Systems  No flowsheet data found.      PHYSICAL EXAMINATION:   Pulse 95   Resp 16   Ht 1.549 m (5' 1\")   Wt 76.1 kg (167 lb 12.8 oz)   SpO2 95%   BMI 31.71 kg/m     Appearance:   normal; NAD, age-appropriate appearance, well-developed, normal habitus   Communication:   normal; communicates verbally, normal voice quality   Head/Face:   inspection -  Normal; no scars or visible lesions   Palpation - not tender along tail of right parotid   Salivary glands -  Palpable firmness, about 0.5 cm in size along right tail of parotid, not visible, no underlying skin changes   Skin:  no skin lesions, no cellulitis   Ears:  auricle (AD) -  normal  auricle (AS) -  normal  Normal clinical speech reception   Nose:  Ext. inspection -  Normal   Oral Cavity:  lips -  Normal mucosa, oral competence, and stoma size   Age-appropriate dentition   Neck: No visible mass or asymmetry   Normal range of motion   Cardiovascular:  warm, pink, well-perfused extremities without swelling, tenderness, or edema   Respiratory:  Normal respiratory effort, no stridor   Neuro/Psych.:  mood/affect -  normal  mental status -  normal        RESULTS REVIEWED:   U/S:  Thyroid parenchyma: Heterogeneous     The right lobe of the thyroid measures: 0.9 x 0.6 x 2.8 cm      The thyroid isthmus measures: 0.1 cm      The left lobe of the thyroid measures: 0.8 x 0.8 x 2.7 cm      Right lobe:  Nodule 1:  Nodule measurement: 0.4 x 0.5 x 0.6 cm   Echogenicity: Isoechoic  Consistency: solid  Calcifications: no  Hypervascular: no     The " right parotid measuring 2.1 x 1.1 x 5.6 cm. There is a  heterogeneous mass with internal vascularity in the inferior portion  of superficial segment of right parotid gland measuring 1.2 x 0.7 x  1.2 cm.   There is a benign-appearing lymph node inferior to the right thyroid  gland measuring 1.2 x 0.5 x 2 cm.                                                                      Impression:  1. There is a heterogeneous mass with internal vascularity in the  inferior portion of superficial segment of right parotid gland  measuring 1.2 x 0.7 x 1.2 cm corresponding to enhancing mass  identified on the CT dated 10/9/2019. Consider further evaluation by  FNA.     2. There is a 6 mm nodule in the right thyroid lobe, likely a benign  finding.     3. Small thyroid gland with associated mild heterogeneity, in keeping  with sequela of thyroiditis.    IMPRESSION AND PLAN:   Nicci Pemberton is a 59 year old woman with a history of a recent liver transplant who had a right parotid mass with symptoms consistent with a sialadenitis. Things drastically improved with conservative medical management with antibiotics, sialagogues, warm compresses, massage. After treatment of the acute infection she had a smaller residual parotid mass. She comes in today for an FNA. The mass appears even smaller compared to last visit but we discussed that most conservative route would be the FNA. She is agreeable to this plan. The FNA was performed by the path team. On discussion with pathology, they felt this appeared to be most consistent with granulation and resolving infection. We will call the patient with final results and any follow-up plan. I provided reassurance for the patient today given the exam improvement and the preliminary pathology results.    Thank you very much for the opportunity to participate in the care of your patient.      Faustina Berman MD, M.D.  Otolaryngology- Head & Neck Surgery      This note was dictated with voice recognition  software and then edited. Please excuse any unintentional errors.         CC:  Virgie Heart, ROBERTO  [address] [facility] [address]

## 2019-10-29 NOTE — TELEPHONE ENCOUNTER
Patient Name: Nicci Pemberton   : 1960  MRN: [id number]       Patient scheduled for:  [x] EGD     Indication for procedure.  [x] Status post liver transplantation (H)    Sedation Type: [x] Conscious Sedation       Procedure Provider:  Manoj      Referring Provider. Mandeep Alford; Wale Thurston    Arrival time verified: Thurs / [phone]    Facility location verified:   [x][facility] - [address], 1st Floor, [address] [phone]    Pt meets medical necessity for outpatient procedure in hospital Endoscopy Unit:     [x] N/A for this Payor (non-BCBS)      Prep Type:   [x]NPO /p 0500, No solid food /p 2200 the night before    Anticoagulants or blood thinners: [x]None               Electronic implanted devices: [x] No      H&P / Pre op physical completed: [x] N/A    Additional Information: Discussed timing of anti-rejection medications pre procedure  _______________________________________________      Instructions given:  [x] Reviewed         Pre procedure teaching completed: [x] Yes - Reviewed    [x] No questions regarding Sedation as ordered    Transportation from procedure & responsible adult to be with patient following procedure for a minimum of 6 hrs (Conscious Sedation) 24 hrs (MAC): [x] Yes  - confirmed will have post-procedure companionship as required    Mae Baron, RN, RN  [facility]

## 2019-10-30 ENCOUNTER — RECORDS - HEALTHEAST (OUTPATIENT)
Dept: LAB | Facility: CLINIC | Age: 59
End: 2019-10-30

## 2019-10-30 ENCOUNTER — OFFICE VISIT (OUTPATIENT)
Dept: OTOLARYNGOLOGY | Facility: CLINIC | Age: 59
End: 2019-10-30
Payer: COMMERCIAL

## 2019-10-30 VITALS
WEIGHT: 167.8 LBS | BODY MASS INDEX: 31.68 KG/M2 | HEIGHT: 61 IN | RESPIRATION RATE: 16 BRPM | HEART RATE: 95 BPM | OXYGEN SATURATION: 95 %

## 2019-10-30 DIAGNOSIS — K11.8 PAROTID MASS: Primary | ICD-10-CM

## 2019-10-30 LAB — TSH SERPL DL<=0.005 MIU/L-ACNC: 0.05 UIU/ML (ref 0.3–5)

## 2019-10-30 ASSESSMENT — PAIN SCALES - GENERAL: PAINLEVEL: SEVERE PAIN (6)

## 2019-10-30 ASSESSMENT — MIFFLIN-ST. JEOR: SCORE: 1273.52

## 2019-10-30 NOTE — LETTER
"10/30/2019     RE: Nicci Pemberton  [address] [facility] [address]     Dear Colleague,    Thank you for referring your patient, Nicci Pemberton, to the [facility] at [facility]. Please see a copy of my visit note below.    Dear Virgie Heart:    I had the pleasure of seeing Nicci Pemberton in follow-up today at the [facility].     History of Present Illness:   Nicci Pemberton is a 59 year old woman who was referred urgently for evaluation of parotid mass on 10/9/2019. She has a history of a liver transplant about 2 months ago. She was recently admitted to the hospital and diagnosed with \"otitis media\" of the right ear. On review of notes and discussion with the patient, she had no signs of otitis media on exam other than blood in the canal. Around 10/5/2019 she developed pain and swelling in her right neck (tail of parotid) which was progressive in nature. She had a CT scan on the day of evaluation which showed what appeared to be a potential area of sialadenitis in the right parotid. She was started on augmentin, sialagogues, massage, warm compresses.    Interval history:  She comes in today for follow-up. She was last seen 10/23/2019. At that time she was significantly improved with no further pain. She did have a residual mass that was much smaller in size. An U/S was obtained which demonstrated a 1.2 x 0.7 x 1.2 cm right parotid mass. She comes in today for an FNA now that the acute infection has resolved. She says that her symptoms continue to improve. She is working to ensure adequate hydration. She thinks the mass may be smaller in size.       MEDICATIONS:     Current Outpatient Medications   Medication Sig Dispense Refill     acetaminophen-caffeine (EXCEDRIN TENSION HEADACHE) 500-65 MG TABS Take 1 tablet by mouth daily as needed (tension headache)       ascorbic acid 500 MG TABS Take 1 tablet (500 mg) by mouth " daily 30 tablet 0     aspirin (ASA) 81 MG tablet Take 1 tablet (81 mg) by mouth daily 90 tablet 3     Cholecalciferol (VITAMIN D-3) 1000 units CAPS Take 1,000 Units by mouth 2 times daily 60 capsule 0     cycloSPORINE modified (GENERIC EQUIVALENT) 100 MG capsule Take 1 capsule (100 mg) by mouth 2 times daily *175 mg every 12 hours 180 capsule 3     cycloSPORINE modified (GENERIC EQUIVALENT) 25 MG capsule Take 3 capsules (75 mg) by mouth 2 times daily *total dose of 175 mg twice a day 540 capsule 3     dapsone (ACZONE) 100 MG tablet Take 1 tablet (100 mg) by mouth daily 90 tablet 1     famotidine (PEPCID) 20 MG tablet Take 1 tablet (20 mg) by mouth 2 times daily 30 tablet 1     levothyroxine (SYNTHROID/LEVOTHROID) 175 MCG tablet Take 1 tablet (175 mcg) by mouth daily 30 tablet 0     magnesium oxide (MAG-OX) 400 MG tablet Take 1 tablet (400 mg) by mouth daily 90 tablet 3     meclizine (ANTIVERT) 12.5 MG tablet Take 1 tablet (12.5 mg) by mouth 3 times daily as needed for dizziness 20 tablet 0     mycophenolic acid (GENERIC EQUIVALENT) 360 MG EC tablet Take 1 tablet (360 mg) by mouth 3 times daily 270 tablet 3     simethicone (MYLICON) 80 MG chewable tablet Take 1 tablet (80 mg) by mouth every 6 hours as needed for cramping       sodium chloride (OCEAN) 0.65 % nasal spray Spray 1 spray into both nostrils every hour as needed for congestion 1 Bottle 0     valGANciclovir (VALCYTE) 450 MG tablet Take 1 tablet (450 mg) by mouth daily 90 tablet 3     zinc sulfate (ZINCATE) 220 (50 Zn) MG capsule Take 1 capsule (220 mg) by mouth daily 30 capsule 0       ALLERGIES:    Allergies   Allergen Reactions     Food      Fruits with cores and pits  Apples     Sulfa Drugs Unknown     Swollen joints     Furosemide Rash       HABITS/SOCIAL HISTORY:     Former smoker    Social History     Socioeconomic History     Marital status:      Spouse name: Not on file     Number of children: Not on file     Years of education: Not on  file     Highest education level: Not on file   Occupational History     Not on file   Social Needs     Financial resource strain: Not on file     Food insecurity:     Worry: Not on file     Inability: Not on file     Transportation needs:     Medical: Not on file     Non-medical: Not on file   Tobacco Use     Smoking status: Former Smoker     Last attempt to quit: 2011     Years since quittin.8     Smokeless tobacco: Never Used     Tobacco comment: weekend smoker    Substance and Sexual Activity     Alcohol use: No     Drug use: No     Sexual activity: Not on file   Lifestyle     Physical activity:     Days per week: Not on file     Minutes per session: Not on file     Stress: Not on file   Relationships     Social connections:     Talks on phone: Not on file     Gets together: Not on file     Attends [employer] service: Not on file     Active member of club or organization: Not on file     Attends meetings of clubs or organizations: Not on file     Relationship status: Not on file     Intimate partner violence:     Fear of current or ex partner: Not on file     Emotionally abused: Not on file     Physically abused: Not on file     Forced sexual activity: Not on file   Other Topics Concern     Parent/sibling w/ CABG, MI or angioplasty before 65F 55M? Not Asked   Social History Narrative     Not on file       PAST MEDICAL HISTORY:   Past Medical History:   Diagnosis Date     Anemia      Cellulitis      Cirrhosis of liver (H) 2018     Heterozygous alpha 1-antitrypsin deficiency (H)      High hepatic iron concentration determined by biopsy of liver      Liver cirrhosis secondary to ANGULO (H)      Liver transplanted (H) 2019     Lymphedema      Osteoarthritis      SBP (spontaneous bacterial peritonitis) (H) 2019 in [facility]        PAST SURGICAL HISTORY:   Past Surgical History:   Procedure Laterality Date     BENCH LIVER N/A 2019    Procedure: BACKBENCH PREPARATION, LIVER;  Surgeon:  "Mandeep Alford MD;  Location: [facility] OR     COLONOSCOPY N/A 2018    Procedure: COLONOSCOPY;  colonoscopy;  Surgeon: Hugo Patel MD;  Location: [facility] OR     IR FEEDING TUBE PLACEMENT W MOHAN/MD  2019     IR FLUORO 0-1 HOUR  2019     IR LUMBAR PUNCTURE  2019     TRANSPLANT LIVER RECIPIENT  DONOR N/A 2019    Procedure: TRANSPLANT, LIVER, RECIPIENT,  DONOR;  Surgeon: Mandeep Alford MD;  Location: [facility] OR       FAMILY HISTORY:    Family History   Problem Relation Age of Onset     Cirrhosis No family hx of      Liver Cancer No family hx of        REVIEW OF SYSTEMS:  12 point ROS was negative other than the symptoms noted above in the HPI.  Patient Supplied Answers to Review of Systems  No flowsheet data found.      PHYSICAL EXAMINATION:   Pulse 95   Resp 16   Ht 1.549 m (5' 1\")   Wt 76.1 kg (167 lb 12.8 oz)   SpO2 95%   BMI 31.71 kg/m      Appearance:   normal; NAD, age-appropriate appearance, well-developed, normal habitus   Communication:   normal; communicates verbally, normal voice quality   Head/Face:   inspection -  Normal; no scars or visible lesions   Palpation - not tender along tail of right parotid   Salivary glands -  Palpable firmness, about 0.5 cm in size along right tail of parotid, not visible, no underlying skin changes   Skin:  no skin lesions, no cellulitis   Ears:  auricle (AD) -  normal  auricle (AS) -  normal  Normal clinical speech reception   Nose:  Ext. inspection -  Normal   Oral Cavity:  lips -  Normal mucosa, oral competence, and stoma size   Age-appropriate dentition   Neck: No visible mass or asymmetry   Normal range of motion   Cardiovascular:  warm, pink, well-perfused extremities without swelling, tenderness, or edema   Respiratory:  Normal respiratory effort, no stridor   Neuro/Psych.:  mood/affect -  normal  mental status -  normal        RESULTS REVIEWED:   U/S:  Thyroid parenchyma: Heterogeneous     The right lobe of the thyroid " measures: 0.9 x 0.6 x 2.8 cm      The thyroid isthmus measures: 0.1 cm      The left lobe of the thyroid measures: 0.8 x 0.8 x 2.7 cm      Right lobe:  Nodule 1:  Nodule measurement: 0.4 x 0.5 x 0.6 cm   Echogenicity: Isoechoic  Consistency: solid  Calcifications: no  Hypervascular: no     The right parotid measuring 2.1 x 1.1 x 5.6 cm. There is a  heterogeneous mass with internal vascularity in the inferior portion  of superficial segment of right parotid gland measuring 1.2 x 0.7 x  1.2 cm.   There is a benign-appearing lymph node inferior to the right thyroid  gland measuring 1.2 x 0.5 x 2 cm.                                                                      Impression:  1. There is a heterogeneous mass with internal vascularity in the  inferior portion of superficial segment of right parotid gland  measuring 1.2 x 0.7 x 1.2 cm corresponding to enhancing mass  identified on the CT dated 10/9/2019. Consider further evaluation by  FNA.     2. There is a 6 mm nodule in the right thyroid lobe, likely a benign  finding.     3. Small thyroid gland with associated mild heterogeneity, in keeping  with sequela of thyroiditis.    IMPRESSION AND PLAN:   Nicci Pemberton is a 59 year old woman with a history of a recent liver transplant who had a right parotid mass with symptoms consistent with a sialadenitis. Things drastically improved with conservative medical management with antibiotics, sialagogues, warm compresses, massage. After treatment of the acute infection she had a smaller residual parotid mass. She comes in today for an FNA. The mass appears even smaller compared to last visit but we discussed that most conservative route would be the FNA. She is agreeable to this plan. The FNA was performed by the path team. On discussion with pathology, they felt this appeared to be most consistent with granulation and resolving infection. We will call the patient with final results and any follow-up plan. I provided  reassurance for the patient today given the exam improvement and the preliminary pathology results.    Thank you very much for the opportunity to participate in the care of your patient.      Faustina Berman MD, M.D.  Otolaryngology- Head & Neck Surgery    This note was dictated with voice recognition software and then edited. Please excuse any unintentional errors.     CC:  Virgie Heart, ROBERTO  [address] [address]

## 2019-10-30 NOTE — NURSING NOTE
"Chief Complaint   Patient presents with     RECHECK     Parotid mass     Pulse 95   Resp 16   Ht 1.549 m (5' 1\")   Wt 76.1 kg (167 lb 12.8 oz)   SpO2 95%   BMI 31.71 kg/m      Javier Carlos CMA    "

## 2019-10-31 ENCOUNTER — HOSPITAL ENCOUNTER (OUTPATIENT)
Facility: CLINIC | Age: 59
Discharge: HOME | End: 2019-10-31
Attending: INTERNAL MEDICINE | Admitting: INTERNAL MEDICINE
Payer: COMMERCIAL

## 2019-10-31 VITALS
RESPIRATION RATE: 17 BRPM | DIASTOLIC BLOOD PRESSURE: 75 MMHG | SYSTOLIC BLOOD PRESSURE: 110 MMHG | OXYGEN SATURATION: 92 % | HEART RATE: 73 BPM

## 2019-10-31 LAB — UPPER GI ENDOSCOPY: NORMAL

## 2019-10-31 PROCEDURE — 88305 TISSUE EXAM BY PATHOLOGIST: CPT | Performed by: INTERNAL MEDICINE

## 2019-10-31 PROCEDURE — G0500 MOD SEDAT ENDO SERVICE >5YRS: HCPCS | Performed by: INTERNAL MEDICINE

## 2019-10-31 PROCEDURE — 43239 EGD BIOPSY SINGLE/MULTIPLE: CPT | Performed by: INTERNAL MEDICINE

## 2019-10-31 PROCEDURE — 25000125 ZZHC RX 250: Performed by: INTERNAL MEDICINE

## 2019-10-31 PROCEDURE — 25000128 H RX IP 250 OP 636: Performed by: INTERNAL MEDICINE

## 2019-10-31 RX ORDER — FENTANYL CITRATE 50 UG/ML
INJECTION, SOLUTION INTRAMUSCULAR; INTRAVENOUS PRN
Status: DISCONTINUED | OUTPATIENT
Start: 2019-10-31 | End: 2019-10-31 | Stop reason: HOSPADM

## 2019-11-01 ENCOUNTER — PATIENT OUTREACH (OUTPATIENT)
Dept: OTOLARYNGOLOGY | Facility: CLINIC | Age: 59
End: 2019-11-01

## 2019-11-01 ENCOUNTER — TELEPHONE (OUTPATIENT)
Dept: PHARMACY | Facility: CLINIC | Age: 59
End: 2019-11-01

## 2019-11-01 DIAGNOSIS — K11.8 PAROTID MASS: Primary | ICD-10-CM

## 2019-11-01 DIAGNOSIS — E43 SEVERE MALNUTRITION (H): ICD-10-CM

## 2019-11-01 DIAGNOSIS — Z94.4 STATUS POST LIVER TRANSPLANTATION (H): Chronic | ICD-10-CM

## 2019-11-01 LAB
COPATH REPORT: NORMAL
COPATH REPORT: NORMAL

## 2019-11-01 RX ORDER — ZINC SULFATE 50(220)MG
220 CAPSULE ORAL DAILY
Qty: 30 CAPSULE | Refills: 2 | Status: CANCELLED | OUTPATIENT
Start: 2019-11-01

## 2019-11-01 RX ORDER — CHOLECALCIFEROL (VITAMIN D3) 25 MCG
1000 CAPSULE ORAL 2 TIMES DAILY
Qty: 60 CAPSULE | Refills: 2 | Status: CANCELLED | OUTPATIENT
Start: 2019-11-01

## 2019-11-01 RX ORDER — LEVOTHYROXINE SODIUM 175 UG/1
175 TABLET ORAL DAILY
Qty: 30 TABLET | Refills: 0 | Status: CANCELLED | OUTPATIENT
Start: 2019-11-01

## 2019-11-01 NOTE — TELEPHONE ENCOUNTER
Clinical Pharmacy Consult:                                                      Transplant Specific:   Date of Transplant: 08/18/2019  Type of Transplant: liver  First Transplant: yes  History of rejection: no    Immunosuppression Regimen   CSA 175mg qAM & 175qPM and Myforitic 360mg qAM & 360qPM & 360qHS  Immunosuppressant Levels:  Therapeutic 273  Patient specific goal: 200-300  Pt adherent to lab draws: yes  Scr:   Lab Results   Component Value Date    CR 0.94 10/28/2019     Side effects: Nausea and loss of appetite    Prophylactic Medications  Antibacterial:  Dapsone 100mg daily  Scheduled Discontinue Date: 6 months    Antifungal: Not needed thus far    Antiviral: CrCl 40 to 59 mL/minute: Valcyte 450 mg once daily   Scheduled Discontinue Date: 3 months    Acid Reducer: Pepcid (famotidine)  Scheduled Reviewed Date: up to md    Thrombosis Prevention: Aspirin 81 mg PO daily  Scheduled Discontinue Date: up to md    Blood Pressure Management  Frequency of home Blood Pressure checks: once daily  Most recent home BP: 94/60  Thinks machine is off bringing to clinic at next appt  Patient Blood pressure goal: <140/90  Patient blood pressure at goal:  yes  Hospitalizations/ER visits since last assessment: 1 for dehydration      Med rec/DUR performed: yes  Med Rec Discrepancies:  No    Nicci reports feeling pretty good.  She has some nausea and loss of appetite.  She was able to read her med card and knew her meds well.  She is still a bit overwhelmed by everything but things are starting to settle down.    Germain Sosa [facility]

## 2019-11-01 NOTE — PROGRESS NOTES
Called patient with the following pathology results:    CYTOLOGIC INTERPRETATION:     Neck, right parotid , palpation guided fine needle aspiration:   Negative for malignancy   Specimen Adequacy: Satisfactory for evaluation but limited by:   -scant cellularity.     COMMENT:   The aspirates mostly show just blood, however one aspirate shows acute   inflammation, with some chronic   inflammation, histiocytes and granulation tissue.  This morphologically   indicates a resolving inflammatory   process.  If there is continued clinical concern, growth of the lesion or   just failure to completely resolve,   an excisional or imaging guided tissue biopsy is recommended to determine   if a more serious lesion is present.    Image correlation is recommended.       Dr. Berman would like for patient to repeat ultrasound in 2 months. Patient was agreeable to plan. She will call with any further questions or concerns.     Merlyn Carroll, RN, BSN

## 2019-11-01 NOTE — TELEPHONE ENCOUNTER
Pt also would like Vitamin C 500 MG Chew did see on med list      Thank you  Ivory Sloan [facility]

## 2019-11-02 ENCOUNTER — MEDICAL CORRESPONDENCE (OUTPATIENT)
Dept: HEALTH INFORMATION MANAGEMENT | Facility: CLINIC | Age: 59
End: 2019-11-02

## 2019-11-04 ENCOUNTER — OFFICE VISIT (OUTPATIENT)
Dept: TRANSPLANT | Facility: CLINIC | Age: 59
End: 2019-11-04
Attending: TRANSPLANT SURGERY
Payer: COMMERCIAL

## 2019-11-04 ENCOUNTER — TELEPHONE (OUTPATIENT)
Dept: TRANSPLANT | Facility: CLINIC | Age: 59
End: 2019-11-04

## 2019-11-04 VITALS
HEIGHT: 62 IN | WEIGHT: 166.8 LBS | HEART RATE: 86 BPM | TEMPERATURE: 97.4 F | BODY MASS INDEX: 30.69 KG/M2 | OXYGEN SATURATION: 95 % | DIASTOLIC BLOOD PRESSURE: 65 MMHG | SYSTOLIC BLOOD PRESSURE: 96 MMHG

## 2019-11-04 DIAGNOSIS — E43 SEVERE MALNUTRITION (H): ICD-10-CM

## 2019-11-04 DIAGNOSIS — Z94.4 LIVER REPLACED BY TRANSPLANT (H): ICD-10-CM

## 2019-11-04 DIAGNOSIS — Z94.4 STATUS POST LIVER TRANSPLANTATION (H): Primary | ICD-10-CM

## 2019-11-04 DIAGNOSIS — Z94.4 STATUS POST LIVER TRANSPLANTATION (H): ICD-10-CM

## 2019-11-04 DIAGNOSIS — Z79.899 LONG TERM CURRENT USE OF IMMUNOSUPPRESSIVE DRUG: ICD-10-CM

## 2019-11-04 LAB
ALBUMIN SERPL-MCNC: 3.7 G/DL (ref 3.4–5)
ALP SERPL-CCNC: 123 U/L (ref 40–150)
ALT SERPL W P-5'-P-CCNC: 20 U/L (ref 0–50)
ANION GAP SERPL CALCULATED.3IONS-SCNC: 5 MMOL/L (ref 3–14)
AST SERPL W P-5'-P-CCNC: 23 U/L (ref 0–45)
BILIRUB DIRECT SERPL-MCNC: 0.8 MG/DL (ref 0–0.2)
BILIRUB SERPL-MCNC: 2.5 MG/DL (ref 0.2–1.3)
BUN SERPL-MCNC: 40 MG/DL (ref 7–30)
CALCIUM SERPL-MCNC: 9.8 MG/DL (ref 8.5–10.1)
CHLORIDE SERPL-SCNC: 103 MMOL/L (ref 94–109)
CO2 SERPL-SCNC: 29 MMOL/L (ref 20–32)
CREAT SERPL-MCNC: 1.23 MG/DL (ref 0.52–1.04)
CYCLOSPORINE BLD LC/MS/MS-MCNC: 290 UG/L (ref 50–400)
ERYTHROCYTE [DISTWIDTH] IN BLOOD BY AUTOMATED COUNT: 13 % (ref 10–15)
GFR SERPL CREATININE-BSD FRML MDRD: 48 ML/MIN/{1.73_M2}
GLUCOSE SERPL-MCNC: 106 MG/DL (ref 70–99)
HCT VFR BLD AUTO: 31.9 % (ref 35–47)
HGB BLD-MCNC: 10.1 G/DL (ref 11.7–15.7)
MAGNESIUM SERPL-MCNC: 1.7 MG/DL (ref 1.6–2.3)
MCH RBC QN AUTO: 33.4 PG (ref 26.5–33)
MCHC RBC AUTO-ENTMCNC: 31.7 G/DL (ref 31.5–36.5)
MCV RBC AUTO: 106 FL (ref 78–100)
PHOSPHATE SERPL-MCNC: 4.5 MG/DL (ref 2.5–4.5)
PLATELET # BLD AUTO: 133 10E9/L (ref 150–450)
POTASSIUM SERPL-SCNC: 4.7 MMOL/L (ref 3.4–5.3)
PROT SERPL-MCNC: 7.2 G/DL (ref 6.8–8.8)
RBC # BLD AUTO: 3.02 10E12/L (ref 3.8–5.2)
SODIUM SERPL-SCNC: 137 MMOL/L (ref 133–144)
TME LAST DOSE: NORMAL H
WBC # BLD AUTO: 3.2 10E9/L (ref 4–11)

## 2019-11-04 PROCEDURE — 83735 ASSAY OF MAGNESIUM: CPT | Performed by: TRANSPLANT SURGERY

## 2019-11-04 PROCEDURE — 80048 BASIC METABOLIC PNL TOTAL CA: CPT | Performed by: TRANSPLANT SURGERY

## 2019-11-04 PROCEDURE — 85027 COMPLETE CBC AUTOMATED: CPT | Performed by: TRANSPLANT SURGERY

## 2019-11-04 PROCEDURE — 36415 COLL VENOUS BLD VENIPUNCTURE: CPT | Performed by: TRANSPLANT SURGERY

## 2019-11-04 PROCEDURE — 84100 ASSAY OF PHOSPHORUS: CPT | Performed by: TRANSPLANT SURGERY

## 2019-11-04 PROCEDURE — 80158 DRUG ASSAY CYCLOSPORINE: CPT | Performed by: TRANSPLANT SURGERY

## 2019-11-04 PROCEDURE — 80076 HEPATIC FUNCTION PANEL: CPT | Performed by: TRANSPLANT SURGERY

## 2019-11-04 PROCEDURE — G0463 HOSPITAL OUTPT CLINIC VISIT: HCPCS | Mod: ZF

## 2019-11-04 RX ORDER — CYCLOSPORINE 25 MG/1
50 CAPSULE, LIQUID FILLED ORAL 2 TIMES DAILY
Qty: 120 CAPSULE | Refills: 3 | Status: SHIPPED | OUTPATIENT
Start: 2019-11-04 | End: 2019-12-03

## 2019-11-04 RX ORDER — CHOLECALCIFEROL (VITAMIN D3) 25 MCG
1000 CAPSULE ORAL 2 TIMES DAILY
Qty: 60 CAPSULE | Refills: 1 | Status: SHIPPED | OUTPATIENT
Start: 2019-11-04 | End: 2020-01-07

## 2019-11-04 RX ORDER — ZINC SULFATE 50(220)MG
220 CAPSULE ORAL DAILY
Qty: 30 CAPSULE | Refills: 0 | Status: SHIPPED | OUTPATIENT
Start: 2019-11-04 | End: 2019-11-18

## 2019-11-04 RX ORDER — ASCORBIC ACID 500 MG
500 TABLET ORAL DAILY
Qty: 30 TABLET | Refills: 0 | Status: SHIPPED | OUTPATIENT
Start: 2019-11-04 | End: 2019-11-18

## 2019-11-04 RX ORDER — CYCLOSPORINE 100 MG/1
100 CAPSULE, LIQUID FILLED ORAL 2 TIMES DAILY
Qty: 180 CAPSULE | Refills: 3 | Status: SHIPPED | OUTPATIENT
Start: 2019-11-04 | End: 2019-12-03

## 2019-11-04 RX ORDER — FAMOTIDINE 20 MG/1
20 TABLET, FILM COATED ORAL 2 TIMES DAILY
Qty: 30 TABLET | Refills: 1 | Status: SHIPPED | OUTPATIENT
Start: 2019-11-04 | End: 2019-12-20

## 2019-11-04 ASSESSMENT — PAIN SCALES - GENERAL: PAINLEVEL: SEVERE PAIN (7)

## 2019-11-04 ASSESSMENT — MIFFLIN-ST. JEOR: SCORE: 1284.85

## 2019-11-04 NOTE — TELEPHONE ENCOUNTER
Patient Call: Medication Refill  Nicci called back;  Was wondering about dose change and refill of other medication(vitamins and minerals)    Pharmacy Name:  [facility]         Name of Medication:  Famotide, Zinc and Vitamin's A and D

## 2019-11-04 NOTE — TELEPHONE ENCOUNTER
Pt's CSA goal 200. Pt's level 290. Pt had accurate level. Pt currently taking 175 mg every 12 hours.    Pt to decrease to 150 mg every 12 hours. Left message for patient to change dose and return call to confirm dose change.

## 2019-11-04 NOTE — NURSING NOTE
Pt's weight trending down. Pt remains with struggling to eat and drink fluids. She has a fullness feeling after a few bites. She had EDG it was gastritis and duodenitis. Patient to have gastric emptying study completed. Pt is having tiny bowel movements. Will order KUB to look for gas patters and if struggling with constipation.     Discussed zinc, vit c with dr julien. Pt to have another month as patient is not eating very well. Patient agreeable with plan.

## 2019-11-04 NOTE — LETTER
11/4/2019      RE: Nicci Pemberton  [address] [facility] [address]       HPI      ROS      Physical Exam    Transplant Surgery -OUTPATIENT IMMUNOSUPPRESSION PROGRESS NOTE    Date of Visit: 11/04/2019    Transplants:  8/18/2019 (Liver); Postoperative day:  78  ASSESMENT AND PLAN:  1.Graft Function:Liver allograft: no rejection or technical problems.  Direct bilirubin is 0.8  2.Immunosuppression Management:keep cyclosporine levels at 200 ng/dL   3.Hypertension: ok  4.Renal Function:mildly elevated  5.Lab frequency:    6.Other:  Gastritis, recommend gastric emptying time    Date: November 4, 2019    Transplant:  [x]                             Liver [x]                              Kidney []                             Pancreas []                              Other:             Chief Complaint:  Doing well, no fevers or abdominal pain  History of Present Illness:  Patient Active Problem List   Diagnosis     Cirrhosis of liver (H)     Liver cirrhosis secondary to ANGULO (H)     Heterozygous alpha 1-antitrypsin deficiency (H)     High hepatic iron concentration determined by biopsy of liver     SBP (spontaneous bacterial peritonitis) (H)     Acute kidney failure, unspecified (H)     Status post liver transplantation (H)     Epistaxis     Anemia due to blood loss, acute     C. difficile diarrhea     Metabolic encephalopathy     GAMAL (acute kidney injury) (H)     Steroid-induced hyperglycemia     Immunosuppressed status (H)     Hyperkalemia     Severe malnutrition (H)     S/P liver transplant (H)     Vertigo     Otitis media     Hypoxia     Dehydration     SOCIAL /FAMILY HISTORY: [x]                  No recent change    Past Medical History:   Diagnosis Date     Anemia      Cellulitis      Cirrhosis of liver (H) 6/18/2018     Heterozygous alpha 1-antitrypsin deficiency (H)      High hepatic iron concentration determined by biopsy of liver      Liver cirrhosis secondary to ANGULO (H)      Liver transplanted (H)  2019     Lymphedema      Osteoarthritis      SBP (spontaneous bacterial peritonitis) (H) 2019 in [place]     Past Surgical History:   Procedure Laterality Date     BENCH LIVER N/A 2019    Procedure: BACKBENCH PREPARATION, LIVER;  Surgeon: Mandeep Alford MD;  [facility]     COLONOSCOPY N/A 2018    Procedure: COLONOSCOPY;  colonoscopy;  Surgeon: Hugo Patel MD;  [facility]     ESOPHAGOSCOPY, GASTROSCOPY, DUODENOSCOPY (EGD), COMBINED N/A 10/31/2019    Procedure: Esophagogastroduodenoscopy, With Biopsy;  Surgeon: Nerissa Aranda MD;  Location: [facility]     IR FEEDING TUBE PLACEMENT W MOHAN/MD  2019     IR FLUORO 0-1 HOUR  2019     IR LUMBAR PUNCTURE  2019     TRANSPLANT LIVER RECIPIENT  DONOR N/A 2019    Procedure: TRANSPLANT, LIVER, RECIPIENT,  DONOR;  Surgeon: Mandeep Alford MD;  Location[facility]     Social History     Socioeconomic History     Marital status:      Spouse name: Not on file     Number of children: Not on file     Years of education: Not on file     Highest education level: Not on file   Occupational History     Not on file   Social Needs     Financial resource strain: Not on file     Food insecurity:     Worry: Not on file     Inability: Not on file     Transportation needs:     Medical: Not on file     Non-medical: Not on file   Tobacco Use     Smoking status: Former Smoker     Packs/day: 0.00     Last attempt to quit:      Years since quittin.8     Smokeless tobacco: Never Used   Substance and Sexual Activity     Alcohol use: No     Drug use: No     Sexual activity: Not on file   Lifestyle     Physical activity:     Days per week: Not on file     Minutes per session: Not on file     Stress: Not on file   Relationships     Social connections:     Talks on phone: Not on file     Gets together: Not on file     Attends Nondenominational service: Not on file     Active member of club or organization:  Not on file     Attends meetings of clubs or organizations: Not on file     Relationship status: Not on file     Intimate partner violence:     Fear of current or ex partner: Not on file     Emotionally abused: Not on file     Physically abused: Not on file     Forced sexual activity: Not on file   Other Topics Concern     Parent/sibling w/ CABG, MI or angioplasty before 65F 55M? Not Asked   Social History Narrative     Not on file     Prescription Medications as of 11/4/2019       Rx Number Disp Refills Start End Last Dispensed Date Next Fill Date Owning Pharmacy    acetaminophen-caffeine (EXCEDRIN TENSION HEADACHE) 500-65 MG TABS    9/30/2019        Sig: Take 1 tablet by mouth daily as needed (tension headache)    Class: OTC    Route: Oral    ascorbic acid 500 MG TABS  30 tablet 0 9/30/2019    [place] [address]    Sig: Take 1 tablet (500 mg) by mouth daily    Class: E-Prescribe    Route: Oral    aspirin (ASA) 81 MG tablet  90 tablet 3 10/14/2019    [phone]    Sig: Take 1 tablet (81 mg) by mouth daily    Class: E-Prescribe    Route: Oral    Cholecalciferol (VITAMIN D-3) 1000 units CAPS  60 capsule 0 9/30/2019    [place] [address]    Sig: Take 1,000 Units by mouth 2 times daily    Class: E-Prescribe    Route: Oral    cycloSPORINE modified (GENERIC EQUIVALENT) 100 MG capsule  180 capsule 3 10/29/2019    [facility] [address]    Sig: Take 1 capsule (100 mg) by mouth 2 times daily *175 mg every 12 hours    Class: E-Prescribe    Route: Oral    cycloSPORINE modified (GENERIC EQUIVALENT) 25 MG capsule  540 capsule 3 10/29/2019    [facility]/[place] [address]    Sig: Take 3 capsules (75 mg) by mouth 2 times daily *total dose of 175 mg twice a day    Class: E-Prescribe     Route: Oral    dapsone (ACZONE) 100 MG tablet  90 tablet 1 10/29/2019    [facility]/[place] [address]    Sig: Take 1 tablet (100 mg) by mouth daily    Class: E-Prescribe    Route: Oral    famotidine (PEPCID) 20 MG tablet  30 tablet 1 10/14/2019    [facility] [address] [facility] [phone]    Sig: Take 1 tablet (20 mg) by mouth 2 times daily    Class: E-Prescribe    Route: Oral    levothyroxine (SYNTHROID/LEVOTHROID) 175 MCG tablet  30 tablet 0 9/30/2019    [facility] [address]    Sig: Take 1 tablet (175 mcg) by mouth daily    Class: E-Prescribe    Route: Oral    magnesium oxide (MAG-OX) 400 MG tablet  90 tablet 3 10/29/2019    [facility]/[place] [address]    Sig: Take 1 tablet (400 mg) by mouth daily    Class: E-Prescribe    Route: Oral    meclizine (ANTIVERT) 12.5 MG tablet  20 tablet 0 10/6/2019    [place], [place] - [address] [facility] [address]    Sig: Take 1 tablet (12.5 mg) by mouth 3 times daily as needed for dizziness    Class: E-Prescribe    Route: Oral    mycophenolic acid (GENERIC EQUIVALENT) 360 MG EC tablet  270 tablet 3 10/29/2019    [facility]/[place] [address]    Sig: Take 1 tablet (360 mg) by mouth 3 times daily    Class: E-Prescribe    Route: Oral    simethicone (MYLICON) 80 MG chewable tablet    9/30/2019        Sig: Take 1 tablet (80 mg) by mouth every 6 hours as needed for cramping    Class: OTC    Route: Oral    valGANciclovir (VALCYTE) 450 MG tablet  90 tablet 3 10/29/2019    [facility]/[place] [address] [address]    Sig: Take 1 tablet (450 mg) by mouth daily    Class: E-Prescribe    Route: Oral    zinc sulfate (ZINCATE) 220 (50 Zn) MG capsule  30 capsule 0 9/30/2019    [facility] [address]     "Sig: Take 1 capsule (220 mg) by mouth daily    Class: E-Prescribe    Route: Oral    sodium chloride (OCEAN) 0.65 % nasal spray  1 Bottle 0 10/6/2019    [facility] [place], [place] - [address] [place] [address]    Sig: [place] 1 spray into both nostrils every hour as needed for congestion    Class: E-Prescribe    Route: Both Nostrils        Food; Sulfa drugs; and Furosemide   REVIEW OF SYSTEMS (check box if normal)  [x]               GENERAL  [x]                 PULMONARY [x]                GENITOURINARY  [x]                CNS                 [x]                 CARDIAC  [x]                 ENDOCRINE  [x]                EARS,NOSE,THROAT [x]                 GASTROINTESTINAL [x]                 NEUROLOGIC    [x]                MUSCLOSKELTAL  [x]                  HEMATOLOGY      PHYSICAL EXAM (check box if normal)BP 96/65 (BP Location: Left arm, Patient Position: Sitting, Cuff Size: Adult Large)   Pulse 86   Temp 97.4  F (36.3  C) (Oral)   Ht 1.575 m (5' 2\")   Wt 75.7 kg (166 lb 12.8 oz)   SpO2 95%   BMI 30.51 kg/m         [x]            GENERAL:    [x]       EYES:  ICTERIC   []        YES  []                    NO  [x]           EXTREMITIES: Clubbing []       Y     [x]           N    [x]           EARS, NOSE, THROAT: Membranes Moist    YES   [x]                   NO []                  [x]           LUNGS:  CLEAR    YES       [x]                  NO    []                                [x]           SKIN: Jaundice           YES       []                  NO    [x]                   Rash: YES       []                  NO    [x]                                     [x]             HEART: Regular Rate          YES       [x]                  NO    []                   Incision Clean:  YES       [x]                  NO    []                                [x]                    ABDOMEN: Wound healthy Organomegaly YES       []                  NO    [x]                       [x]                    " NEUROLOGICAL:  Nonfocal  YES       [x]                  NO    []                       [x]                    Hernia YES       []                  NO    [x]                   PSYCHIATRIC:  Appropriate  YES       [x]                  NO    []                       OTHER:                                                                                                   PAIN SCALE:: 3    Mandeep Alford MD

## 2019-11-04 NOTE — PROGRESS NOTES
HPI      ROS      Physical Exam    Transplant Surgery -OUTPATIENT IMMUNOSUPPRESSION PROGRESS NOTE    Date of Visit: 11/04/2019    Transplants:  8/18/2019 (Liver); Postoperative day:  78  ASSESMENT AND PLAN:  1.Graft Function:Liver allograft: no rejection or technical problems.  Direct bilirubin is 0.8  2.Immunosuppression Management:keep cyclosporine levels at 200 ng/dL   3.Hypertension: ok  4.Renal Function:mildly elevated  5.Lab frequency:    6.Other:  Gastritis, recommend gastric emptying time    Date: November 4, 2019    Transplant:  [x]                             Liver [x]                              Kidney []                             Pancreas []                              Other:             Chief Complaint:  Doing well, no fevers or abdominal pain  History of Present Illness:  Patient Active Problem List   Diagnosis     Cirrhosis of liver (H)     Liver cirrhosis secondary to ANGULO (H)     Heterozygous alpha 1-antitrypsin deficiency (H)     High hepatic iron concentration determined by biopsy of liver     SBP (spontaneous bacterial peritonitis) (H)     Acute kidney failure, unspecified (H)     Status post liver transplantation (H)     Epistaxis     Anemia due to blood loss, acute     C. difficile diarrhea     Metabolic encephalopathy     GAMAL (acute kidney injury) (H)     Steroid-induced hyperglycemia     Immunosuppressed status (H)     Hyperkalemia     Severe malnutrition (H)     S/P liver transplant (H)     Vertigo     Otitis media     Hypoxia     Dehydration     SOCIAL /FAMILY HISTORY: [x]                  No recent change    Past Medical History:   Diagnosis Date     Anemia      Cellulitis      Cirrhosis of liver (H) 6/18/2018     Heterozygous alpha 1-antitrypsin deficiency (H)      High hepatic iron concentration determined by biopsy of liver      Liver cirrhosis secondary to ANGULO (H)      Liver transplanted (H) 08/18/2019     Lymphedema      Osteoarthritis      SBP (spontaneous bacterial peritonitis)  (H) 2019 in [place]     Past Surgical History:   Procedure Laterality Date     BENCH LIVER N/A 2019    Procedure: BACKBENCH PREPARATION, LIVER;  Surgeon: Mandeep Alford MD;  [facility]     COLONOSCOPY N/A 2018    Procedure: COLONOSCOPY;  colonoscopy;  Surgeon: Hugo Patel MD;  [facility]     ESOPHAGOSCOPY, GASTROSCOPY, DUODENOSCOPY (EGD), COMBINED N/A 10/31/2019    Procedure: Esophagogastroduodenoscopy, With Biopsy;  Surgeon: Nerissa Aranda MD;  Location: [facility]     IR FEEDING TUBE PLACEMENT W MOHAN/MD  2019     IR FLUORO 0-1 HOUR  2019     IR LUMBAR PUNCTURE  2019     TRANSPLANT LIVER RECIPIENT  DONOR N/A 2019    Procedure: TRANSPLANT, LIVER, RECIPIENT,  DONOR;  Surgeon: Mandeep Alford MD;  Location: [facility]     Social History     Socioeconomic History     Marital status:      Spouse name: Not on file     Number of children: Not on file     Years of education: Not on file     Highest education level: Not on file   Occupational History     Not on file   Social Needs     Financial resource strain: Not on file     Food insecurity:     Worry: Not on file     Inability: Not on file     Transportation needs:     Medical: Not on file     Non-medical: Not on file   Tobacco Use     Smoking status: Former Smoker     Packs/day: 0.00     Last attempt to quit: 2011     Years since quittin.8     Smokeless tobacco: Never Used   Substance and Sexual Activity     Alcohol use: No     Drug use: No     Sexual activity: Not on file   Lifestyle     Physical activity:     Days per week: Not on file     Minutes per session: Not on file     Stress: Not on file   Relationships     Social connections:     Talks on phone: Not on file     Gets together: Not on file     Attends [employer] service: Not on file     Active member of club or organization: Not on file     Attends meetings of clubs or organizations: Not on file     Relationship  status: Not on file     Intimate partner violence:     Fear of current or ex partner: Not on file     Emotionally abused: Not on file     Physically abused: Not on file     Forced sexual activity: Not on file   Other Topics Concern     Parent/sibling w/ CABG, MI or angioplasty before 65F 55M? Not Asked   Social History Narrative     Not on file     Prescription Medications as of 11/4/2019       Rx Number Disp Refills Start End Last Dispensed Date Next Fill Date Owning Pharmacy    acetaminophen-caffeine (EXCEDRIN TENSION HEADACHE) 500-65 MG TABS    9/30/2019        Sig: Take 1 tablet by mouth daily as needed (tension headache)    Class: OTC    Route: Oral    ascorbic acid 500 MG TABS  30 tablet 0 9/30/2019    [facility] [place] [address]    Sig: Take 1 tablet (500 mg) by mouth daily    Class: E-Prescribe    Route: Oral    aspirin (ASA) 81 MG tablet  90 tablet 3 10/14/2019    [facility] [address]    Sig: Take 1 tablet (81 mg) by mouth daily    Class: E-Prescribe    Route: Oral    Cholecalciferol (VITAMIN D-3) 1000 units CAPS  60 capsule 0 9/30/2019    [facility] [place] [address]    Sig: Take 1,000 Units by mouth 2 times daily    Class: E-Prescribe    Route: Oral    cycloSPORINE modified (GENERIC EQUIVALENT) 100 MG capsule  180 capsule 3 10/29/2019    [facility] [place] [address]    Sig: Take 1 capsule (100 mg) by mouth 2 times daily *175 mg every 12 hours    Class: E-Prescribe    Route: Oral    cycloSPORINE modified (GENERIC EQUIVALENT) 25 MG capsule  540 capsule 3 10/29/2019    [facility] [place] [address]    Sig: Take 3 capsules (75 mg) by mouth 2 times daily *total dose of 175 mg twice a day    Class: E-Prescribe    Route: Oral    dapsone (ACZONE) 100 MG tablet  90 tablet 1 10/29/2019    [place]  [facility]/[place] [address]    Sig: Take 1 tablet (100 mg) by mouth daily    Class: E-Prescribe    Route: Oral    famotidine (PEPCID) 20 MG tablet  30 tablet 1 10/14/2019    [facility] [address] [facility] [address]    Sig: Take 1 tablet (20 mg) by mouth 2 times daily    Class: E-Prescribe    Route: Oral    levothyroxine (SYNTHROID/LEVOTHROID) 175 MCG tablet  30 tablet 0 9/30/2019    [facility] [address]    Sig: Take 1 tablet (175 mcg) by mouth daily    Class: E-Prescribe    Route: Oral    magnesium oxide (MAG-OX) 400 MG tablet  90 tablet 3 10/29/2019    [facility]/[place] [address]    Sig: Take 1 tablet (400 mg) by mouth daily    Class: E-Prescribe    Route: Oral    meclizine (ANTIVERT) 12.5 MG tablet  20 tablet 0 10/6/2019    [place], [place] - [address] [facility] [address]    Sig: Take 1 tablet (12.5 mg) by mouth 3 times daily as needed for dizziness    Class: E-Prescribe    Route: Oral    mycophenolic acid (GENERIC EQUIVALENT) 360 MG EC tablet  270 tablet 3 10/29/2019    [facility]/[place] [address]    Sig: Take 1 tablet (360 mg) by mouth 3 times daily    Class: E-Prescribe    Route: Oral    simethicone (MYLICON) 80 MG chewable tablet    9/30/2019        Sig: Take 1 tablet (80 mg) by mouth every 6 hours as needed for cramping    Class: OTC    Route: Oral    valGANciclovir (VALCYTE) 450 MG tablet  90 tablet 3 10/29/2019    [facility]/[place] [address] [address]    Sig: Take 1 tablet (450 mg) by mouth daily    Class: E-Prescribe    Route: Oral    zinc sulfate (ZINCATE) 220 (50 Zn) MG capsule  30 capsule 0 9/30/2019    [facility] [address]    Sig: Take 1 capsule (220 mg) by mouth daily    Class: E-Prescribe    Route: Oral  "   sodium chloride (OCEAN) 0.65 % nasal spray  1 Bottle 0 10/6/2019    [facility] - [place], [place] - [address] [facility]    Sig: [place] 1 spray into both nostrils every hour as needed for congestion    Class: E-Prescribe    Route: Both Nostrils        Food; Sulfa drugs; and Furosemide   REVIEW OF SYSTEMS (check box if normal)  [x]               GENERAL  [x]                 PULMONARY [x]                GENITOURINARY  [x]                CNS                 [x]                 CARDIAC  [x]                 ENDOCRINE  [x]                EARS,NOSE,THROAT [x]                 GASTROINTESTINAL [x]                 NEUROLOGIC    [x]                MUSCLOSKELTAL  [x]                  HEMATOLOGY      PHYSICAL EXAM (check box if normal)BP 96/65 (BP Location: Left arm, Patient Position: Sitting, Cuff Size: Adult Large)   Pulse 86   Temp 97.4  F (36.3  C) (Oral)   Ht 1.575 m (5' 2\")   Wt 75.7 kg (166 lb 12.8 oz)   SpO2 95%   BMI 30.51 kg/m        [x]            GENERAL:    [x]       EYES:  ICTERIC   []        YES  []                    NO  [x]           EXTREMITIES: Clubbing []       Y     [x]           N    [x]           EARS, NOSE, THROAT: Membranes Moist    YES   [x]                   NO []                  [x]           LUNGS:  CLEAR    YES       [x]                  NO    []                                [x]           SKIN: Jaundice           YES       []                  NO    [x]                   Rash: YES       []                  NO    [x]                                     [x]             HEART: Regular Rate          YES       [x]                  NO    []                   Incision Clean:  YES       [x]                  NO    []                                [x]                    ABDOMEN: Wound healthy Organomegaly YES       []                  NO    [x]                       [x]                    NEUROLOGICAL:  Nonfocal  YES       [x]                  NO    []                       [x]    "                 Hernia YES       []                  NO    [x]                   PSYCHIATRIC:  Appropriate  YES       [x]                  NO    []                       OTHER:                                                                                                   PAIN SCALE:: 3

## 2019-11-05 ENCOUNTER — MEDICAL CORRESPONDENCE (OUTPATIENT)
Dept: HEALTH INFORMATION MANAGEMENT | Facility: CLINIC | Age: 59
End: 2019-11-05

## 2019-11-11 ENCOUNTER — TELEPHONE (OUTPATIENT)
Dept: TRANSPLANT | Facility: CLINIC | Age: 59
End: 2019-11-11

## 2019-11-11 DIAGNOSIS — Z94.4 STATUS POST LIVER TRANSPLANTATION (H): ICD-10-CM

## 2019-11-11 LAB
ALBUMIN SERPL-MCNC: 3.5 G/DL (ref 3.4–5)
ALP SERPL-CCNC: 105 U/L (ref 40–150)
ALT SERPL W P-5'-P-CCNC: 19 U/L (ref 0–50)
ANION GAP SERPL CALCULATED.3IONS-SCNC: 3 MMOL/L (ref 3–14)
AST SERPL W P-5'-P-CCNC: 21 U/L (ref 0–45)
BILIRUB DIRECT SERPL-MCNC: 0.6 MG/DL (ref 0–0.2)
BILIRUB SERPL-MCNC: 2 MG/DL (ref 0.2–1.3)
BUN SERPL-MCNC: 34 MG/DL (ref 7–30)
CALCIUM SERPL-MCNC: 9.5 MG/DL (ref 8.5–10.1)
CHLORIDE SERPL-SCNC: 107 MMOL/L (ref 94–109)
CO2 SERPL-SCNC: 29 MMOL/L (ref 20–32)
CREAT SERPL-MCNC: 0.93 MG/DL (ref 0.52–1.04)
ERYTHROCYTE [DISTWIDTH] IN BLOOD BY AUTOMATED COUNT: 13.6 % (ref 10–15)
GFR SERPL CREATININE-BSD FRML MDRD: 67 ML/MIN/{1.73_M2}
GLUCOSE SERPL-MCNC: 77 MG/DL (ref 70–99)
HCT VFR BLD AUTO: 28.6 % (ref 35–47)
HGB BLD-MCNC: 9 G/DL (ref 11.7–15.7)
MAGNESIUM SERPL-MCNC: 1.8 MG/DL (ref 1.6–2.3)
MCH RBC QN AUTO: 34.2 PG (ref 26.5–33)
MCHC RBC AUTO-ENTMCNC: 31.5 G/DL (ref 31.5–36.5)
MCV RBC AUTO: 109 FL (ref 78–100)
PHOSPHATE SERPL-MCNC: 4.1 MG/DL (ref 2.5–4.5)
PLATELET # BLD AUTO: 126 10E9/L (ref 150–450)
POTASSIUM SERPL-SCNC: 4.7 MMOL/L (ref 3.4–5.3)
PROT SERPL-MCNC: 6.9 G/DL (ref 6.8–8.8)
RBC # BLD AUTO: 2.63 10E12/L (ref 3.8–5.2)
SODIUM SERPL-SCNC: 139 MMOL/L (ref 133–144)
WBC # BLD AUTO: 2.2 10E9/L (ref 4–11)

## 2019-11-11 PROCEDURE — 80048 BASIC METABOLIC PNL TOTAL CA: CPT

## 2019-11-11 PROCEDURE — 80197 ASSAY OF TACROLIMUS: CPT

## 2019-11-11 PROCEDURE — 80158 DRUG ASSAY CYCLOSPORINE: CPT | Performed by: TRANSPLANT SURGERY

## 2019-11-11 PROCEDURE — 83735 ASSAY OF MAGNESIUM: CPT

## 2019-11-11 PROCEDURE — 85027 COMPLETE CBC AUTOMATED: CPT

## 2019-11-11 PROCEDURE — 80076 HEPATIC FUNCTION PANEL: CPT

## 2019-11-11 PROCEDURE — 84100 ASSAY OF PHOSPHORUS: CPT

## 2019-11-11 NOTE — TELEPHONE ENCOUNTER
Pt calls to state that she is confused if she should take her meds are not for this gastric emptying scheduled for tomorrow. Informed pt that she will have to take her immunosuppression meds for sure but can adjust the time that she takes them to accommodate the appt. For example, informed pt that she can take them at 9 and 9 instead of 8 and 8. Just as long as pt takes the meds 12 hours apart. Also suggested she call the department and discuss with them as well. Pt agreed.

## 2019-11-11 NOTE — TELEPHONE ENCOUNTER
Called [facility] and gave the verbal ok to continue until the end of the month or until pot able to take herself to appts.

## 2019-11-11 NOTE — TELEPHONE ENCOUNTER
Provider Call: [facility]  Route to N  Facility Name: [facility]    Reason for Call: Need orders for continued lab draws until the pt goes to do labs on her own. Please connect to get [facility] RN lab orders    Callback needed? Yes    Return Call Needed  Same as documented in contacts section  When to return call?: Same day: Route High Priority

## 2019-11-12 ENCOUNTER — HOSPITAL ENCOUNTER (OUTPATIENT)
Dept: NUCLEAR MEDICINE | Facility: CLINIC | Age: 59
Setting detail: NUCLEAR MEDICINE
Discharge: HOME | End: 2019-11-12
Attending: TRANSPLANT SURGERY | Admitting: TRANSPLANT SURGERY
Payer: COMMERCIAL

## 2019-11-12 ENCOUNTER — TELEPHONE (OUTPATIENT)
Dept: TRANSPLANT | Facility: CLINIC | Age: 59
End: 2019-11-12

## 2019-11-12 ENCOUNTER — HOSPITAL ENCOUNTER (OUTPATIENT)
Dept: GENERAL RADIOLOGY | Facility: CLINIC | Age: 59
Discharge: HOME | End: 2019-11-12
Attending: TRANSPLANT SURGERY | Admitting: TRANSPLANT SURGERY
Payer: COMMERCIAL

## 2019-11-12 DIAGNOSIS — Z94.4 STATUS POST LIVER TRANSPLANTATION (H): ICD-10-CM

## 2019-11-12 DIAGNOSIS — Z94.4 STATUS POST LIVER TRANSPLANTATION (H): Primary | ICD-10-CM

## 2019-11-12 LAB
TACROLIMUS BLD-MCNC: <3 UG/L (ref 5–15)
TME LAST DOSE: ABNORMAL H

## 2019-11-12 PROCEDURE — 34300033 ZZH RX 343: Performed by: TRANSPLANT SURGERY

## 2019-11-12 PROCEDURE — 78264 GASTRIC EMPTYING IMG STUDY: CPT

## 2019-11-12 PROCEDURE — A9541 TC99M SULFUR COLLOID: HCPCS | Performed by: TRANSPLANT SURGERY

## 2019-11-12 PROCEDURE — 74018 RADEX ABDOMEN 1 VIEW: CPT

## 2019-11-12 RX ADMIN — Medication 2 MILLICURIE: at 09:04

## 2019-11-13 ENCOUNTER — DOCUMENTATION ONLY (OUTPATIENT)
Dept: CARE COORDINATION | Facility: CLINIC | Age: 59
End: 2019-11-13

## 2019-11-13 ENCOUNTER — TELEPHONE (OUTPATIENT)
Dept: TRANSPLANT | Facility: CLINIC | Age: 59
End: 2019-11-13

## 2019-11-13 ENCOUNTER — HOSPITAL ENCOUNTER (OUTPATIENT)
Dept: PHYSICAL THERAPY | Facility: CLINIC | Age: 59
Setting detail: THERAPIES SERIES
End: 2019-11-13
Attending: TRANSPLANT SURGERY
Payer: COMMERCIAL

## 2019-11-13 ENCOUNTER — PRE VISIT (OUTPATIENT)
Dept: SLEEP MEDICINE | Facility: CLINIC | Age: 59
End: 2019-11-13

## 2019-11-13 DIAGNOSIS — Z94.4 STATUS POST LIVER TRANSPLANTATION (H): ICD-10-CM

## 2019-11-13 LAB
CYCLOSPORINE BLD LC/MS/MS-MCNC: 194 UG/L (ref 50–400)
CYCLOSPORINE BLD LC/MS/MS-MCNC: NORMAL UG/L (ref 50–400)
TME LAST DOSE: NORMAL H
TME LAST DOSE: NORMAL H

## 2019-11-13 PROCEDURE — 97110 THERAPEUTIC EXERCISES: CPT | Mod: GP | Performed by: PHYSICAL THERAPIST

## 2019-11-13 PROCEDURE — 97535 SELF CARE MNGMENT TRAINING: CPT | Mod: GP | Performed by: PHYSICAL THERAPIST

## 2019-11-13 PROCEDURE — 97140 MANUAL THERAPY 1/> REGIONS: CPT | Mod: GP | Performed by: PHYSICAL THERAPIST

## 2019-11-13 NOTE — TELEPHONE ENCOUNTER
Left message for patient to return call.    Pt's wBC trending down. Per Dr julien patient to decrease myfortic to BID.     Pt also has delayed gastric emptying. Message to hepatology for plan.

## 2019-11-13 NOTE — TELEPHONE ENCOUNTER
"  1.  Reason for the visit:  Consult to discuss hypoxia   2.  Referring provider and clinic name:Sharon Cash NP ED department  3.  Previous Sleep Doctor or Pulmonlogist (clinic name)?  None noted  4.  Records, Procedures, Imaging, and Labs (see below)  No records to obtain        All NOTES from previous office visits that pertain to why they are being seen in the Sleep Center    Previous Sleep Studies, Chest CT, Echos and reports that pertain to why they are seeing Sleep Center    All Sleep records that have been done in the last 2 years that pertain to why they are seeing Sleep Center            Are they being seen for continuation of care for Cpap/Bipap/Avap/Trilogy/Dental Device? none    If yes to above Who and Where was Device issued/currently getting supplies from? na    Are you currently on \"Supplemental Oxygen\" during the day or night?   na                                                                                                                                                      Please remind pt to bring Cpap machine and ask to arrive 15 minutes early to appointment due traffic and congestion                                                 5. Pt Sleep Center Packet received Message left asking pt to arrive 30 minutes early to appointment if no packet received.        Yes: \"please make sure that you bring this to your appointment completed, either the doctor will not see you until this completed or you may be asked to reschedule your appointment.\"     No: mail or email to the pt and explain, \"please make sure that you bring this to your appointment completed, either the doctor will not see you until this completed or you may be asked to reschedule your appointment.\"     ~If pt coming early to fill packet out, ask that they come 30 minutes prior to their appointment~     6. Has the pt's medication list been updated and preferred pharmacy added?     7. Has the allergy list been reviewed?    \"Thank you " "for choosing [facility] and we look forward to seeing you at your upcoming appointment\"     "

## 2019-11-13 NOTE — PROGRESS NOTES
Outpatient Physical Therapy Discharge Note     Patient: Nicci Pemberton  : 1960    Beginning/End Dates of Reporting Period:  10/11/19 to 2019    Referring Provider: Dr. Mandeep Alford    Therapy Diagnosis: B LE lymphedema     Client Self Report:  Pt continues to deal with multiple medical issues, but feels the current compression is helping her swelling and is happy to continue it.     Objective Measurements:  Edema: Less than 1+ pitting on lower legs, no pitting on feet or thighs  Volume of LEs: to 70 cm in ml:R: 8116 (-6%); L: 8981 (-5%)    Outcome Measures (most recent score):  Lymphedema Life Impact Scale (score range 0-72). A higher score indicates greater impairment.: 1    Goals:  Goal Identifier home program   Goal Description In order to improve tolerance for functional mobility, ADLs, and recreational activities outside of physical therapy, patient will demonstrate independence with home program of exercise and lifestyle modification    Target Date 20   Date Met  19   Progress:     Goal Identifier compression    Goal Description Pt and spouse will be independent in well fitting compression garments   Target Date 01/15/20   Date Met  19   Progress:     Goal Identifier volume   Goal Description Pt will have 2% or more decrease in volume of LEs in order to have less risk of infection   Target Date 01/15/19   Date Met  19   Progress:     Goal Identifier     Goal Description     Target Date     Date Met      Progress:       Progress Toward Goals:   Progress this reporting period: Pt has done well with home compression and has met all goals.       Plan:  Discharge from therapy.    Discharge:    Reason for Discharge: Patient has met all goals.    Equipment Issued: sigvarus compreflex transition lower leg garment with transition liners    Discharge Plan: Patient to continue home program.

## 2019-11-14 RX ORDER — MYCOPHENOLIC ACID 360 MG/1
360 TABLET, DELAYED RELEASE ORAL 2 TIMES DAILY
Qty: 60 TABLET | Refills: 0 | Status: SHIPPED | OUTPATIENT
Start: 2019-11-14 | End: 2019-11-18

## 2019-11-14 RX ORDER — VALGANCICLOVIR 450 MG/1
450 TABLET, FILM COATED ORAL EVERY OTHER DAY
Qty: 30 TABLET | Refills: 0 | Status: SHIPPED | OUTPATIENT
Start: 2019-11-14 | End: 2019-11-18

## 2019-11-14 NOTE — TELEPHONE ENCOUNTER
Spoke with patient that we decreased myfortic to 360 mg BID and valcyte to every other day.   Patient repeated dose change and verbalizes understanding.

## 2019-11-16 ASSESSMENT — ENCOUNTER SYMPTOMS
DECREASED APPETITE: 1
ARTHRALGIAS: 1
BLOOD IN STOOL: 0
SMELL DISTURBANCE: 0
NUMBNESS: 0
WEIGHT LOSS: 1
WEAKNESS: 1
SHORTNESS OF BREATH: 0
HEMOPTYSIS: 0
ABDOMINAL PAIN: 1
EXERCISE INTOLERANCE: 0
BACK PAIN: 1
NECK MASS: 1
STIFFNESS: 1
COUGH DISTURBING SLEEP: 0
JAUNDICE: 0
NIGHT SWEATS: 0
PARALYSIS: 0
MUSCLE WEAKNESS: 1
BRUISES/BLEEDS EASILY: 0
SLEEP DISTURBANCES DUE TO BREATHING: 0
SPUTUM PRODUCTION: 0
ALTERED TEMPERATURE REGULATION: 1
LIGHT-HEADEDNESS: 1
DYSURIA: 0
MEMORY LOSS: 1
HEARTBURN: 0
COUGH: 0
TREMORS: 0
PANIC: 0
JOINT SWELLING: 0
HALLUCINATIONS: 0
SYNCOPE: 0
HOARSE VOICE: 0
SKIN CHANGES: 0
CHILLS: 0
INSOMNIA: 0
LEG PAIN: 1
DIARRHEA: 0
HEMATURIA: 0
NAUSEA: 0
DECREASED CONCENTRATION: 1
LOSS OF CONSCIOUSNESS: 0
SNORES LOUDLY: 1
WHEEZING: 0
DIZZINESS: 1
FATIGUE: 1
DECREASED LIBIDO: 1
NECK PAIN: 1
DEPRESSION: 1
NERVOUS/ANXIOUS: 1
SINUS PAIN: 0
SPEECH CHANGE: 0
MYALGIAS: 1
HEADACHES: 1
SWOLLEN GLANDS: 1
VOMITING: 0
CONSTIPATION: 0
SINUS CONGESTION: 1
ORTHOPNEA: 0
POLYDIPSIA: 0
TASTE DISTURBANCE: 1
DIFFICULTY URINATING: 0
SORE THROAT: 0
POOR WOUND HEALING: 0
SEIZURES: 0
NAIL CHANGES: 1
HYPERTENSION: 0
POSTURAL DYSPNEA: 0
HOT FLASHES: 0
TINGLING: 0
RECTAL PAIN: 0
BOWEL INCONTINENCE: 0
TROUBLE SWALLOWING: 0
WEIGHT GAIN: 0
HYPOTENSION: 1
DYSPNEA ON EXERTION: 0
DISTURBANCES IN COORDINATION: 0
MUSCLE CRAMPS: 0
PALPITATIONS: 0
INCREASED ENERGY: 1
POLYPHAGIA: 0
FEVER: 0
FLANK PAIN: 0
BLOATING: 1

## 2019-11-17 NOTE — PROGRESS NOTES
Sleep Consultation Note:  Date on this visit: 11/19/2019    Nicci Pemberton is sent by Sharon Merchant for a sleep consultation regarding hypoxia.    Primary Physician: Wale Thurston     Impression/Plan:    Snoring  Day time sleepiness  Concern for DIXIE  Concern for hypoventilation  Hypothyroidism    Patient is being evaluated for DIXIE.  STOP BANG score is 2/8. Concern for obstructive sleep apnea due to snoring, day time sleepiness, family history of DIXIE, post-menopausal, obesity, and history of intermittent oxygen dips while at the rehab center.    Additional concern for possible hypoventilation as saturations are 93% today in clinic and she had a bicarbonate of 29 as of 11/18/19 and elevated bicarbs on weekly lab draws going back for the past month at least suggesting possible CO2 retention. Patient had normal PFTs over one year ago and CXR from September does not appear consistent with emphysema, in regards to her alpha-1 antitrypsin deficiency.     I believe the best way to assess for DIXIE and hypoventilation would be to do an in-lab sleep study.  If insurance will not cover an in-lab study, home study testing will be performed. Patient has agreed to this plan.    Encouraged her to continue to follow closely with her primary in regards to hypothyroid treatment as hypothyroid can also affect DIXIE.    Diagnosis and treatment for DIXIE have been discussed. Complications of untreated DIXIE have also been discussed.    Patient is aformer smoker and has been encouraged to continue to not smoke.    Encourage weight loss, healthy diet, and exercise.     Patient was strongly advised to avoid driving, operating any heavy machinery or other hazardous situations while drowsy or sleepy.  Patient was counseled on the importance of driving while alert, to pull over if drowsy, or nap before getting into the vehicle if sleepy.      Plan to follow up results of study via MAPPING.    CC: Sharon Merchant    Seen and examined with  "Dr. Manuel Kulkarni MD   Sleep medicine fellow    Attending: As the attending physician I was present with  Dr. Juan Diego Kulkarni,  during this clinic visit. I personally reviewed the key aspects of the history and physical exam and I agree with the above documentation.    \"I spent a total of 30 minutes face to face with Nicci Pemberton during today's office visit. Over 50% of this time was spent counseling the patient and  coordinating care regarding DIXIE, hypoventilation,polysomnography, hypothyroidism.\"       Yoli Cota MD   of Medicine,  Division of Pulmonary/Sleep Medicine  [facility].    HPI  CC:  Low oxygen levels during rehab after surgery    Nicci Pemberton is a 59 year old with PMH alpha 1-antitrypsin def, cirrhosis s/p liver transplant 8/8/19, HTN, hypothyroidism who reports night time oxygen problems while in rehab. She has not had a previous sleep study.    She notes having fatty liver and iron overload for a long time. Per chart review she is heterozygous for alpha-1 atitrypsin deficiency (MZ phenotype).     Discharged around 10/1. Not sure how the consult came about. Notes that while in rehab (3 weeks in September), there were occasional times that she was placed on oxygen (maybe 5 times) overnight for oxygen dips. 3 of the times she felt that she was having trouble breathing, she believes the oxygen would dip down to 85%, but was not persistently low. They considered sending her home with oxygen but decided in the end that it was not necessary.   Has never seen a pulmonologist, no lung problems in the past. Few times in the hospital with pleural effusions that were quite mild.  Quit smoking 8 years ago, started around 16-17 years old mostly social for a long time, weekend smoker.     Sleep Disordered Breathing  Nicci does report snoring, dry mouth  Unsure about witnessed apneas.  Rare morning headaches.  Denies snort " "arousals, choking/gasping for air  Feels sleep is not fully refreshing, but attributes this to surgery and feels it is improving.  Some fatigue during the day, not sure if this is post operative fatigue.  Brother with DIXIE on CPAP.    Sleep Schedule/Sleep Complaints  She does not report difficulty with falling asleep. Nicci goes to bed at 11 PM, and it usually takes 1 minutes to fall asleep.  She does not complain of difficulty with staying asleep.  She wakes up few times throughout the night to use the restroom. Goes right back to sleep.  Gets up at 9 or 930 am     Patient does have a regular bed partner.  Patient sleeps on her back and side.  She does not use a sleep aid.     Notes has always slept in and \"been a sleeper\".    Patient is a night person.  She would naturally to go to sleep at 1:00 AM and wake up at 9 AM.    Daytime Functioning  Nicci is not napping much at the moment, post-operatively was napping frequently and had low energy, but is not routinely napping anymore. Always felt napping was not very rejuvenating, not restful in short naps.     She does may doze off during the day - reading, sitting quietly, in the car    She denies dozing while driving prior to surgery (has not driven since)    Patient's sleepiness scale result is 10  0-5 Lower Normal Daytime Sleepiness  6-10 Higher Normal Daytime Sleepiness    KASHMIR score is 5 (normal 0-7, mild 8-14, moderate 15-21, severe 22-28)    Sleep Behaviors  She denies any cataplexy, sleep paralysis, sleep hallucinations.    She denies any night time behaviors - sleep walking, sleep talking, sleep eating.    She does not report history of acting out dreams.  Patient denies any injury to oneself or others while sleeping.    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  She rarely drinks caffeine now.  She does not drink alcohol.  Patient was a former social smoker for " decades.  Denies any secondhand exposure.  Patient does not use drugs.    Future plan for knee replacement.    Allergies:    Allergies   Allergen Reactions     Food      Fruits with cores and pits  Apples     Sulfa Drugs Unknown     Swollen joints     Furosemide Rash       Medications:    Current Outpatient Medications   Medication Sig Dispense Refill     aspirin (ASA) 81 MG tablet Take 1 tablet (81 mg) by mouth daily 90 tablet 3     Cholecalciferol (VITAMIN D-3) 25 MCG (1000 UT) CAPS Take 1,000 Units by mouth 2 times daily 60 capsule 1     cycloSPORINE modified (GENERIC EQUIVALENT) 100 MG capsule Take 1 capsule (100 mg) by mouth 2 times daily *150 mg every 12 hours 180 capsule 3     cycloSPORINE modified (GENERIC EQUIVALENT) 25 MG capsule Take 2 capsules (50 mg) by mouth 2 times daily *total dose of 150 mg twice a day 120 capsule 3     dapsone (ACZONE) 100 MG tablet Take 1 tablet (100 mg) by mouth daily 90 tablet 1     famotidine (PEPCID) 20 MG tablet Take 1 tablet (20 mg) by mouth 2 times daily 30 tablet 1     levothyroxine (SYNTHROID/LEVOTHROID) 150 MCG tablet Take 1 tablet (150 mcg) by mouth daily       mycophenolic acid (GENERIC EQUIVALENT) 360 MG EC tablet Take 1 tablet (360 mg) by mouth daily Weaning off. 7 tablet 0       Problem List:  Patient Active Problem List    Diagnosis Date Noted     Dehydration 10/14/2019     Priority: Medium     Otitis media 10/06/2019     Priority: Medium     Hypoxia 10/06/2019     Priority: Medium     Vertigo 10/04/2019     Priority: Medium     S/P liver transplant (H) 09/23/2019     Priority: Medium     Severe malnutrition (H) 09/20/2019     Priority: Medium     Epistaxis 09/19/2019     Priority: Medium     Anemia due to blood loss, acute 09/19/2019     Priority: Medium     C. difficile diarrhea 09/19/2019     Priority: Medium     Metabolic encephalopathy 09/19/2019     Priority: Medium     GAMAL (acute kidney injury) (H) 09/19/2019     Priority: Medium     Steroid-induced  hyperglycemia 2019     Priority: Medium     Immunosuppressed status (H) 2019     Priority: Medium     Hyperkalemia 2019     Priority: Medium     Acute kidney failure, unspecified (H) 2019     Priority: Medium     Status post liver transplantation (H) 2019     Priority: Medium     SBP (spontaneous bacterial peritonitis) (H) 2019     Priority: Medium     19 in [facility]       Liver cirrhosis secondary to ANGULO (H)      Priority: Medium     Heterozygous alpha 1-antitrypsin deficiency (H)      Priority: Medium     High hepatic iron concentration determined by biopsy of liver      Priority: Medium     Cirrhosis of liver (H) 2018     Priority: Medium        Past Medical/Surgical History:  Past Medical History:   Diagnosis Date     Anemia      Cellulitis      Cirrhosis of liver (H) 2018     Heterozygous alpha 1-antitrypsin deficiency (H)      High hepatic iron concentration determined by biopsy of liver      Liver cirrhosis secondary to ANGULO (H)      Liver transplanted (H) 2019     Lymphedema      Osteoarthritis      SBP (spontaneous bacterial peritonitis) (H) 2019 in [facility]     Past Surgical History:   Procedure Laterality Date     BENCH LIVER N/A 2019    Procedure: BACKBENCH PREPARATION, LIVER;  Surgeon: Mandeep Alford MD;  Location: [facility] OR     COLONOSCOPY N/A 2018    Procedure: COLONOSCOPY;  colonoscopy;  Surgeon: Hugo Patel MD;  Location: [facility] OR     ESOPHAGOSCOPY, GASTROSCOPY, DUODENOSCOPY (EGD), COMBINED N/A 10/31/2019    Procedure: Esophagogastroduodenoscopy, With Biopsy;  Surgeon: Nerissa Aranda MD;  Location: [facility] GI     IR FEEDING TUBE PLACEMENT MERCEDEZ PRESTON/MD  2019     IR FLUORO 0-1 HOUR  2019     IR LUMBAR PUNCTURE  2019     TRANSPLANT LIVER RECIPIENT  DONOR N/A 2019    Procedure: TRANSPLANT, LIVER, RECIPIENT,  DONOR;  Surgeon: Mandeep Alford MD;  Location: [facility] OR       Social  History:  Social History     Socioeconomic History     Marital status:      Spouse name: Not on file     Number of children: Not on file     Years of education: Not on file     Highest education level: Not on file   Occupational History     Not on file   Social Needs     Financial resource strain: Not on file     Food insecurity:     Worry: Not on file     Inability: Not on file     Transportation needs:     Medical: Not on file     Non-medical: Not on file   Tobacco Use     Smoking status: Former Smoker     Packs/day: 0.00     Last attempt to quit:      Years since quittin.8     Smokeless tobacco: Never Used   Substance and Sexual Activity     Alcohol use: No     Drug use: No     Sexual activity: Not on file   Lifestyle     Physical activity:     Days per week: Not on file     Minutes per session: Not on file     Stress: Not on file   Relationships     Social connections:     Talks on phone: Not on file     Gets together: Not on file     Attends Sabianism service: Not on file     Active member of club or organization: Not on file     Attends meetings of clubs or organizations: Not on file     Relationship status: Not on file     Intimate partner violence:     Fear of current or ex partner: Not on file     Emotionally abused: Not on file     Physically abused: Not on file     Forced sexual activity: Not on file   Other Topics Concern     Parent/sibling w/ CABG, MI or angioplasty before 65F 55M? Not Asked   Social History Narrative     Not on file       Family History:  Family History   Problem Relation Age of Onset     Cirrhosis No family hx of      Liver Cancer No family hx of        Review of Systems:  A complete review of systems reviewed by me is negative with the exeption of what has been mentioned in the history of present illness.  CONSTITUTIONAL: Positive for weight loss. Negative for fever, chills, sweats or night sweats, drug allergies.  EYES: NEGATIVE for changes in vision, blind spots,  "double vision.  ENT: NEGATIVE for ear pain, sore throat, sinus pain, post-nasal drip, runny nose, bloody nose  CARDIAC: Positive for swelling in legs. NEGATIVE for fast heartbeats or fluttering in chest, chest pain or pressure, breathlessness when lying flat  NEUROLOGIC: NEGATIVE headaches, weakness or numbness in the arms or legs.  DERMATOLOGIC: NEGATIVE for rashes, new moles or change in mole(s)  PULMONARY: NEGATIVE SOB at rest, SOB with activity, dry cough, productive cough, coughing up blood, wheezing or whistling when breathing.    GASTROINTESTINAL: Positive abdominal pain. NEGATIVE for nausea or vomitting, loose or watery stools, fat or grease in stools, constipation, , bowel movements black in color or blood noted.  GENITOURINARY:Urinating more frequently than usual  MUSCULOSKELETAL: Positive for bone or joint pain.  ENDOCRINE: Positive for increased urination.   LYMPHATIC: Positive for swollen salivary gland in right neck.  PSYCHE: Positive for occasional depression, anxiety    Physical Examination:  Vitals: BP 97/63   Pulse 80   Resp 16   Ht 1.575 m (5' 2\")   Wt 75.3 kg (166 lb)   SpO2 93%   BMI 30.36 kg/m    BMI= Body mass index is 30.36 kg/m .    Beverly Total Score 11/19/2019   Total score - Beverly 10     General: No apparent distress, appropriately groomed  Head: Normocephalic, atraumatic  Eyes: no icterus, PERRL  Nose: Nares patent. No exudate/erythema. No septal deviation noted.  Mouth: op pink and moist, teeth: Normal bite, tongue: Normal,  Orophraynx:   Mallampati Class: I.   Tonsillar Stage: 1  hidden by pillars.  Neck: Supple, Circumference: 33 cm  Cardiac: Regular rate and rhythm  Chest: Symmetric air movement, lungs clear to auscultation bilaterally  Musculoskeletal: Swelling appreciated in lower extremities, left more so than right  Skin: Warm, dry, intact  Psych: Mood pleasant, affect congruent  Neuro:  Mental status: Awake, alert, attentive, oriented.  Cranial nerves: Grossly intact, " no focal deficits appreciated.  Motor: Tone within normal limit    Previous Studies:

## 2019-11-18 ENCOUNTER — OFFICE VISIT (OUTPATIENT)
Dept: TRANSPLANT | Facility: CLINIC | Age: 59
End: 2019-11-18
Attending: TRANSPLANT SURGERY
Payer: COMMERCIAL

## 2019-11-18 VITALS
HEIGHT: 62 IN | SYSTOLIC BLOOD PRESSURE: 138 MMHG | DIASTOLIC BLOOD PRESSURE: 73 MMHG | WEIGHT: 168.4 LBS | TEMPERATURE: 98.1 F | HEART RATE: 73 BPM | BODY MASS INDEX: 30.99 KG/M2 | OXYGEN SATURATION: 96 %

## 2019-11-18 DIAGNOSIS — Z94.4 LIVER REPLACED BY TRANSPLANT (H): ICD-10-CM

## 2019-11-18 DIAGNOSIS — K30 DELAYED GASTRIC EMPTYING: Primary | ICD-10-CM

## 2019-11-18 DIAGNOSIS — Z94.4 STATUS POST LIVER TRANSPLANTATION (H): ICD-10-CM

## 2019-11-18 DIAGNOSIS — Z79.899 LONG TERM CURRENT USE OF IMMUNOSUPPRESSIVE DRUG: ICD-10-CM

## 2019-11-18 LAB
ALBUMIN SERPL-MCNC: 3.7 G/DL (ref 3.4–5)
ALP SERPL-CCNC: 118 U/L (ref 40–150)
ALT SERPL W P-5'-P-CCNC: 18 U/L (ref 0–50)
ANION GAP SERPL CALCULATED.3IONS-SCNC: 5 MMOL/L (ref 3–14)
AST SERPL W P-5'-P-CCNC: 25 U/L (ref 0–45)
BILIRUB DIRECT SERPL-MCNC: 0.6 MG/DL (ref 0–0.2)
BILIRUB SERPL-MCNC: 1.8 MG/DL (ref 0.2–1.3)
BUN SERPL-MCNC: 39 MG/DL (ref 7–30)
CALCIUM SERPL-MCNC: 9.5 MG/DL (ref 8.5–10.1)
CHLORIDE SERPL-SCNC: 105 MMOL/L (ref 94–109)
CO2 SERPL-SCNC: 29 MMOL/L (ref 20–32)
CREAT SERPL-MCNC: 0.96 MG/DL (ref 0.52–1.04)
CYCLOSPORINE BLD LC/MS/MS-MCNC: 208 UG/L (ref 50–400)
ERYTHROCYTE [DISTWIDTH] IN BLOOD BY AUTOMATED COUNT: 13.9 % (ref 10–15)
GFR SERPL CREATININE-BSD FRML MDRD: 65 ML/MIN/{1.73_M2}
GLUCOSE SERPL-MCNC: 87 MG/DL (ref 70–99)
HCT VFR BLD AUTO: 27.2 % (ref 35–47)
HGB BLD-MCNC: 8.7 G/DL (ref 11.7–15.7)
MAGNESIUM SERPL-MCNC: 1.9 MG/DL (ref 1.6–2.3)
MCH RBC QN AUTO: 35.4 PG (ref 26.5–33)
MCHC RBC AUTO-ENTMCNC: 32 G/DL (ref 31.5–36.5)
MCV RBC AUTO: 111 FL (ref 78–100)
PHOSPHATE SERPL-MCNC: 4.2 MG/DL (ref 2.5–4.5)
PLATELET # BLD AUTO: 105 10E9/L (ref 150–450)
POTASSIUM SERPL-SCNC: 4.9 MMOL/L (ref 3.4–5.3)
PROT SERPL-MCNC: 6.9 G/DL (ref 6.8–8.8)
RBC # BLD AUTO: 2.46 10E12/L (ref 3.8–5.2)
SODIUM SERPL-SCNC: 139 MMOL/L (ref 133–144)
TME LAST DOSE: 2200 H
WBC # BLD AUTO: 2.4 10E9/L (ref 4–11)

## 2019-11-18 PROCEDURE — 83735 ASSAY OF MAGNESIUM: CPT | Performed by: TRANSPLANT SURGERY

## 2019-11-18 PROCEDURE — 80048 BASIC METABOLIC PNL TOTAL CA: CPT | Performed by: TRANSPLANT SURGERY

## 2019-11-18 PROCEDURE — G0463 HOSPITAL OUTPT CLINIC VISIT: HCPCS | Mod: ZF

## 2019-11-18 PROCEDURE — 36415 COLL VENOUS BLD VENIPUNCTURE: CPT | Performed by: TRANSPLANT SURGERY

## 2019-11-18 PROCEDURE — 80158 DRUG ASSAY CYCLOSPORINE: CPT | Performed by: TRANSPLANT SURGERY

## 2019-11-18 PROCEDURE — 85027 COMPLETE CBC AUTOMATED: CPT | Performed by: TRANSPLANT SURGERY

## 2019-11-18 PROCEDURE — 80076 HEPATIC FUNCTION PANEL: CPT | Performed by: TRANSPLANT SURGERY

## 2019-11-18 PROCEDURE — 84100 ASSAY OF PHOSPHORUS: CPT | Performed by: TRANSPLANT SURGERY

## 2019-11-18 RX ORDER — MYCOPHENOLIC ACID 360 MG/1
360 TABLET, DELAYED RELEASE ORAL DAILY
Qty: 7 TABLET | Refills: 0 | Status: SHIPPED | OUTPATIENT
Start: 2019-11-18 | End: 2019-11-26

## 2019-11-18 RX ORDER — LEVOTHYROXINE SODIUM 112 UG/1
112.5 TABLET ORAL EVERY MORNING
COMMUNITY
Start: 2019-11-18

## 2019-11-18 ASSESSMENT — PAIN SCALES - GENERAL: PAINLEVEL: NO PAIN (0)

## 2019-11-18 ASSESSMENT — MIFFLIN-ST. JEOR: SCORE: 1292.24

## 2019-11-18 NOTE — NURSING NOTE
"Chief Complaint   Patient presents with     RECHECK     2 week Liver post op     Blood pressure 138/73, pulse 73, temperature 98.1  F (36.7  C), temperature source Oral, height 1.575 m (5' 2.01\"), weight 76.4 kg (168 lb 6.4 oz), SpO2 96 %, not currently breastfeeding.    Kisha Sanchez on 11/18/2019 at 8:59 AM    "

## 2019-11-18 NOTE — PROGRESS NOTES
HPI      ROS      Physical Exam    Transplant Surgery -OUTPATIENT IMMUNOSUPPRESSION PROGRESS NOTE    Date of Visit: 11/18/2019    Transplants:  8/18/2019 (Liver); Postoperative day:  92  ASSESMENT AND PLAN:  1.Graft Function: Liver allograft: no rejection or technical problems.    2.Immunosuppression Management: wean cellcept and keep cyclosporine levels 150-250 ng/dL  3.Hypertension:ok  4.Renal Function:ok  5.Lab frequency: weekly  6.Other:  Wound healthy    Date: November 18, 2019    Transplant:  [x]                             Liver [x]                              Kidney []                             Pancreas []                              Other:             Chief Complaint:  Doing well, no fever  History of Present Illness:  Post liver transplant    Patient Active Problem List   Diagnosis     Cirrhosis of liver (H)     Liver cirrhosis secondary to ANGULO (H)     Heterozygous alpha 1-antitrypsin deficiency (H)     High hepatic iron concentration determined by biopsy of liver     SBP (spontaneous bacterial peritonitis) (H)     Acute kidney failure, unspecified (H)     Status post liver transplantation (H)     Epistaxis     Anemia due to blood loss, acute     C. difficile diarrhea     Metabolic encephalopathy     GAMAL (acute kidney injury) (H)     Steroid-induced hyperglycemia     Immunosuppressed status (H)     Hyperkalemia     Severe malnutrition (H)     S/P liver transplant (H)     Vertigo     Otitis media     Hypoxia     Dehydration     SOCIAL /FAMILY HISTORY: [x]                  No recent change    Past Medical History:   Diagnosis Date     Anemia      Cellulitis      Cirrhosis of liver (H) 6/18/2018     Heterozygous alpha 1-antitrypsin deficiency (H)      High hepatic iron concentration determined by biopsy of liver      Liver cirrhosis secondary to ANGULO (H)      Liver transplanted (H) 08/18/2019     Lymphedema      Osteoarthritis      SBP (spontaneous bacterial peritonitis) (H) 8/2/2019 8/1/19 in [place]      Past Surgical History:   Procedure Laterality Date     BENCH LIVER N/A 2019    Procedure: BACKBENCH PREPARATION, LIVER;  Surgeon: Mandeep Alford MD;  Location: [facility]     COLONOSCOPY N/A 2018    Procedure: COLONOSCOPY;  colonoscopy;  Surgeon: Hugo Patel MD;  [facility]     ESOPHAGOSCOPY, GASTROSCOPY, DUODENOSCOPY (EGD), COMBINED N/A 10/31/2019    Procedure: Esophagogastroduodenoscopy, With Biopsy;  Surgeon: Nerissa Aranda MD;  Location: [facility]     IR FEEDING TUBE PLACEMENT MERCEDEZ PRESTON/MD  2019     IR FLUORO 0-1 HOUR  2019     IR LUMBAR PUNCTURE  2019     TRANSPLANT LIVER RECIPIENT  DONOR N/A 2019    Procedure: TRANSPLANT, LIVER, RECIPIENT,  DONOR;  Surgeon: Mandeep Alford MD;  Location: [facility]     Social History     Socioeconomic History     Marital status:      Spouse name: Not on file     Number of children: Not on file     Years of education: Not on file     Highest education level: Not on file   Occupational History     Not on file   Social Needs     Financial resource strain: Not on file     Food insecurity:     Worry: Not on file     Inability: Not on file     Transportation needs:     Medical: Not on file     Non-medical: Not on file   Tobacco Use     Smoking status: Former Smoker     Packs/day: 0.00     Last attempt to quit: 2011     Years since quittin.8     Smokeless tobacco: Never Used   Substance and Sexual Activity     Alcohol use: No     Drug use: No     Sexual activity: Not on file   Lifestyle     Physical activity:     Days per week: Not on file     Minutes per session: Not on file     Stress: Not on file   Relationships     Social connections:     Talks on phone: Not on file     Gets together: Not on file     Attends Judaism service: Not on file     Active member of club or organization: Not on file     Attends meetings of clubs or organizations: Not on file     Relationship status: Not on file      Intimate partner violence:     Fear of current or ex partner: Not on file     Emotionally abused: Not on file     Physically abused: Not on file     Forced sexual activity: Not on file   Other Topics Concern     Parent/sibling w/ CABG, MI or angioplasty before 65F 55M? Not Asked   Social History Narrative     Not on file     Prescription Medications as of 11/18/2019       Rx Number Disp Refills Start End Last Dispensed Date Next Fill Date Owning Pharmacy    acetaminophen-caffeine (EXCEDRIN TENSION HEADACHE) 500-65 MG TABS    9/30/2019        Sig: Take 1 tablet by mouth daily as needed (tension headache)    Class: OTC    Route: Oral    aspirin (ASA) 81 MG tablet  90 tablet 3 10/14/2019    [facility] [place], [place] - [phone]    Sig: Take 1 tablet (81 mg) by mouth daily    Class: E-Prescribe    Route: Oral    Cholecalciferol (VITAMIN D-3) 25 MCG (1000 UT) CAPS  60 capsule 1 11/4/2019    [facility] [place] [address] [address]    Sig: Take 1,000 Units by mouth 2 times daily    Class: E-Prescribe    Route: Oral    cycloSPORINE modified (GENERIC EQUIVALENT) 100 MG capsule  180 capsule 3 11/4/2019    [facility] [place] [address] [address]    Sig: Take 1 capsule (100 mg) by mouth 2 times daily *150 mg every 12 hours    Class: E-Prescribe    Route: Oral    cycloSPORINE modified (GENERIC EQUIVALENT) 25 MG capsule  120 capsule 3 11/4/2019    [facility] [address] [address]    Sig: Take 2 capsules (50 mg) by mouth 2 times daily *total dose of 150 mg twice a day    Class: E-Prescribe    Route: Oral    dapsone (ACZONE) 100 MG tablet  90 tablet 1 10/29/2019    [facility] [address] [address]    Sig: Take 1 tablet (100 mg) by mouth daily    Class: E-Prescribe    Route: Oral    famotidine (PEPCID) 20 MG tablet  30 tablet 1 11/4/2019    [facility] [place] [address] [address]    Sig: Take 1 tablet (20 mg) by mouth 2 times daily    Class: E-Prescribe    Route: Oral    mycophenolic acid (GENERIC EQUIVALENT) 360 MG EC tablet  7 tablet 0 11/18/2019    [facility] [place] [address] [address]    Sig: Take 1 tablet (360 mg) by mouth daily Weaning off.    Class: E-Prescribe    Notes to Pharmacy: TXP DT 8/18/2019 (Liver) TXP Dischg DT 9/23/2019 DX Liver replaced by transplant Z94.4    Route: Oral    levothyroxine (SYNTHROID/LEVOTHROID) 175 MCG tablet  30 tablet 0 9/30/2019    [facility] [address]    Sig: Take 1 tablet (175 mcg) by mouth daily    Class: E-Prescribe    Route: Oral    meclizine (ANTIVERT) 12.5 MG tablet  20 tablet 0 10/6/2019    [place], [place] - [address]    Sig: Take 1 tablet (12.5 mg) by mouth 3 times daily as needed for dizziness    Class: E-Prescribe    Route: Oral    simethicone (MYLICON) 80 MG chewable tablet    9/30/2019        Sig: Take 1 tablet (80 mg) by mouth every 6 hours as needed for cramping    Class: OTC    Route: Oral    sodium chloride (OCEAN) 0.65 % nasal spray  1 Bottle 0 10/6/2019    [place], [place] - [address]     Sig: [place] 1 spray into both nostrils every hour as needed for congestion    Class: E-Prescribe    Route: Both Nostrils        Food; Sulfa drugs; and Furosemide   REVIEW OF SYSTEMS (check box if normal)  [x]               GENERAL  [x]                 PULMONARY [x]                GENITOURINARY  [x]                CNS                 [x]                 CARDIAC  [x]                 ENDOCRINE  [x]                EARS,NOSE,THROAT [x]                 GASTROINTESTINAL [x]                 NEUROLOGIC    [x]                MUSCLOSKELTAL  [x]                  HEMATOLOGY      PHYSICAL EXAM (check box if normal)/73 (BP Location: Right arm,  "Patient Position: Sitting, Cuff Size: Adult Large)   Pulse 73   Temp 98.1  F (36.7  C) (Oral)   Ht 1.575 m (5' 2.01\")   Wt 76.4 kg (168 lb 6.4 oz)   SpO2 96%   BMI 30.79 kg/m        [x]            GENERAL:    [x]       EYES:  ICTERIC   []        YES  []                    NO  [x]           EXTREMITIES: Clubbing []       Y     [x]           N    [x]           EARS, NOSE, THROAT: Membranes Moist    YES   [x]                   NO []                  [x]           LUNGS:  CLEAR    YES       [x]                  NO    []                                [x]           SKIN: Jaundice           YES       []                  NO    [x]                   Rash: YES       []                  NO    [x]                                     [x]             HEART: Regular Rate          YES       [x]                  NO    []                   Incision Clean:  YES       [x]                  NO    []                                [x]                    ABDOMEN: Wound healthy Organomegaly YES       []                  NO    [x]                       [x]                    NEUROLOGICAL:  Nonfocal  YES       [x]                  NO    []                       [x]                    Hernia YES       []                  NO    [x]                   PSYCHIATRIC:  Appropriate  YES       [x]                  NO    []                       OTHER:                                                                                                   PAIN SCALE:: 3    "

## 2019-11-18 NOTE — NURSING NOTE
Pt stopped valcyte, magnesium, zinc and vit c per anaya.     Pt had two stitches removed that were poking out.     Pt has a new area of swelling by lymph node. Patient to follow up up Dr Berman.     Pt to wait for knee replacement to closer to one year transplant anniversary. Patient needs to have surgery at [facility].     Pt remains struggling with motility. Per dr julien he does not want to wait to wean off myfortic.

## 2019-11-18 NOTE — LETTER
11/18/2019    RE: Nicci Pemberton  [address] [id number]     Transplant Surgery -OUTPATIENT IMMUNOSUPPRESSION PROGRESS NOTE    Date of Visit: 11/18/2019    Transplants:  8/18/2019 (Liver); Postoperative day:  92  ASSESMENT AND PLAN:  1.Graft Function: Liver allograft: no rejection or technical problems.    2.Immunosuppression Management: wean cellcept and keep cyclosporine levels 150-250 ng/dL  3.Hypertension:ok  4.Renal Function:ok  5.Lab frequency: weekly  6.Other:  Wound healthy    Date: November 18, 2019    Transplant:  [x]                             Liver [x]                              Kidney []                             Pancreas []                              Other:             Chief Complaint:  Doing well, no fever  History of Present Illness:  Post liver transplant    Patient Active Problem List   Diagnosis     Cirrhosis of liver (H)     Liver cirrhosis secondary to ANGULO (H)     Heterozygous alpha 1-antitrypsin deficiency (H)     High hepatic iron concentration determined by biopsy of liver     SBP (spontaneous bacterial peritonitis) (H)     Acute kidney failure, unspecified (H)     Status post liver transplantation (H)     Epistaxis     Anemia due to blood loss, acute     C. difficile diarrhea     Metabolic encephalopathy     GAMAL (acute kidney injury) (H)     Steroid-induced hyperglycemia     Immunosuppressed status (H)     Hyperkalemia     Severe malnutrition (H)     S/P liver transplant (H)     Vertigo     Otitis media     Hypoxia     Dehydration     SOCIAL /FAMILY HISTORY: [x]                  No recent change    Past Medical History:   Diagnosis Date     Anemia      Cellulitis      Cirrhosis of liver (H) 6/18/2018     Heterozygous alpha 1-antitrypsin deficiency (H)      High hepatic iron concentration determined by biopsy of liver      Liver cirrhosis secondary to ANGULO (H)      Liver transplanted (H) 08/18/2019     Lymphedema      Osteoarthritis      SBP (spontaneous bacterial  peritonitis) (H) 2019 in [facility]     Past Surgical History:   Procedure Laterality Date     BENCH LIVER N/A 2019    Procedure: BACKBENCH PREPARATION, LIVER;  Surgeon: Mandeep Alford MD;  [facility]     COLONOSCOPY N/A 2018    Procedure: COLONOSCOPY;  colonoscopy;  Surgeon: Hugo Patel MD;  [facility]     ESOPHAGOSCOPY, GASTROSCOPY, DUODENOSCOPY (EGD), COMBINED N/A 10/31/2019    Procedure: Esophagogastroduodenoscopy, With Biopsy;  Surgeon: Nerissa Aranda MD;  Location: [facility]     IR FEEDING TUBE PLACEMENT W MOHAN/MD  2019     IR FLUORO 0-1 HOUR  2019     IR LUMBAR PUNCTURE  2019     TRANSPLANT LIVER RECIPIENT  DONOR N/A 2019    Procedure: TRANSPLANT, LIVER, RECIPIENT,  DONOR;  Surgeon: Mandeep Alford MD;  Location: [facility]     Social History     Socioeconomic History     Marital status:      Spouse name: Not on file     Number of children: Not on file     Years of education: Not on file     Highest education level: Not on file   Occupational History     Not on file   Social Needs     Financial resource strain: Not on file     Food insecurity:     Worry: Not on file     Inability: Not on file     Transportation needs:     Medical: Not on file     Non-medical: Not on file   Tobacco Use     Smoking status: Former Smoker     Packs/day: 0.00     Last attempt to quit:      Years since quittin.8     Smokeless tobacco: Never Used   Substance and Sexual Activity     Alcohol use: No     Drug use: No     Sexual activity: Not on file   Lifestyle     Physical activity:     Days per week: Not on file     Minutes per session: Not on file     Stress: Not on file   Relationships     Social connections:     Talks on phone: Not on file     Gets together: Not on file     Attends Yazdanism service: Not on file     Active member of club or organization: Not on file     Attends meetings of clubs or organizations: Not on file      Relationship status: Not on file     Intimate partner violence:     Fear of current or ex partner: Not on file     Emotionally abused: Not on file     Physically abused: Not on file     Forced sexual activity: Not on file   Other Topics Concern     Parent/sibling w/ CABG, MI or angioplasty before 65F 55M? Not Asked   Social History Narrative     Not on file     Prescription Medications as of 11/18/2019       Rx Number Disp Refills Start End Last Dispensed Date Next Fill Date Owning Pharmacy    acetaminophen-caffeine (EXCEDRIN TENSION HEADACHE) 500-65 MG TABS    9/30/2019        Sig: Take 1 tablet by mouth daily as needed (tension headache)    Class: OTC    Route: Oral    aspirin (ASA) 81 MG tablet  90 tablet 3 10/14/2019    [facility] [place], [place] - [address] [place] [address] [address]    Sig: Take 1 tablet (81 mg) by mouth daily    Class: E-Prescribe    Route: Oral    Cholecalciferol (VITAMIN D-3) 25 MCG (1000 UT) CAPS  60 capsule 1 11/4/2019    [facility] [place] [address] [address]    Sig: Take 1,000 Units by mouth 2 times daily    Class: E-Prescribe    Route: Oral    cycloSPORINE modified (GENERIC EQUIVALENT) 100 MG capsule  180 capsule 3 11/4/2019    [facility] [place], [place] - [facility] [address]    Sig: Take 1 capsule (100 mg) by mouth 2 times daily *150 mg every 12 hours    Class: E-Prescribe    Route: Oral    cycloSPORINE modified (GENERIC EQUIVALENT) 25 MG capsule  120 capsule 3 11/4/2019    [facility] [address] [address]    Sig: Take 2 capsules (50 mg) by mouth 2 times daily *total dose of 150 mg twice a day    Class: E-Prescribe    Route: Oral    dapsone (ACZONE) 100 MG tablet  90 tablet 1 10/29/2019    [facility] [place] [address] [address]    Sig: Take 1 tablet (100 mg) by mouth daily    Class: E-Prescribe    Route: Oral    famotidine (PEPCID) 20 MG tablet   30 tablet 1 11/4/2019    [facility] [place] [address] [address]    Sig: Take 1 tablet (20 mg) by mouth 2 times daily    Class: E-Prescribe    Route: Oral    mycophenolic acid (GENERIC EQUIVALENT) 360 MG EC tablet  7 tablet 0 11/18/2019    [facility] [place] [address] [address]    Sig: Take 1 tablet (360 mg) by mouth daily Weaning off.    Class: E-Prescribe    Notes to Pharmacy: TXP DT 8/18/2019 (Liver) TXP Dischg DT 9/23/2019 DX Liver replaced by transplant Z94.4    Route: Oral    levothyroxine (SYNTHROID/LEVOTHROID) 175 MCG tablet  30 tablet 0 9/30/2019    [facility] [address]    Sig: Take 1 tablet (175 mcg) by mouth daily    Class: E-Prescribe    Route: Oral    meclizine (ANTIVERT) 12.5 MG tablet  20 tablet 0 10/6/2019    [place], [place] - [address] [place] [address]    Sig: Take 1 tablet (12.5 mg) by mouth 3 times daily as needed for dizziness    Class: E-Prescribe    Route: Oral    simethicone (MYLICON) 80 MG chewable tablet    9/30/2019        Sig: Take 1 tablet (80 mg) by mouth every 6 hours as needed for cramping    Class: OTC    Route: Oral    sodium chloride (OCEAN) 0.65 % nasal spray  1 Bottle 0 10/6/2019    [address]    Sig: [place] 1 spray into both nostrils every hour as needed for congestion    Class: E-Prescribe    Route: Both Nostrils        Food; Sulfa drugs; and Furosemide   REVIEW OF SYSTEMS (check box if normal)  [x]               GENERAL  [x]                 PULMONARY [x]                GENITOURINARY  [x]                CNS                 [x]                 CARDIAC  [x]                 ENDOCRINE  [x]                EARS,NOSE,THROAT [x]                 GASTROINTESTINAL [x]                 NEUROLOGIC    [x]                MUSCLOSKELTAL  [x]                  HEMATOLOGY      PHYSICAL EXAM (check box if  "normal)/73 (BP Location: Right arm, Patient Position: Sitting, Cuff Size: Adult Large)   Pulse 73   Temp 98.1  F (36.7  C) (Oral)   Ht 1.575 m (5' 2.01\")   Wt 76.4 kg (168 lb 6.4 oz)   SpO2 96%   BMI 30.79 kg/m         [x]            GENERAL:    [x]       EYES:  ICTERIC   []        YES  []                    NO  [x]           EXTREMITIES: Clubbing []       Y     [x]           N    [x]           EARS, NOSE, THROAT: Membranes Moist    YES   [x]                   NO []                  [x]           LUNGS:  CLEAR    YES       [x]                  NO    []                                [x]           SKIN: Jaundice           YES       []                  NO    [x]                   Rash: YES       []                  NO    [x]                                     [x]             HEART: Regular Rate          YES       [x]                  NO    []                   Incision Clean:  YES       [x]                  NO    []                                [x]                    ABDOMEN: Wound healthy Organomegaly YES       []                  NO    [x]                       [x]                    NEUROLOGICAL:  Nonfocal  YES       [x]                  NO    []                       [x]                    Hernia YES       []                  NO    [x]                   PSYCHIATRIC:  Appropriate  YES       [x]                  NO    []                       OTHER:                                                                                                   PAIN SCALE:: 3      Mandeep Alford MD      "

## 2019-11-19 ENCOUNTER — PATIENT OUTREACH (OUTPATIENT)
Dept: GASTROENTEROLOGY | Facility: CLINIC | Age: 59
End: 2019-11-19

## 2019-11-19 ENCOUNTER — OFFICE VISIT (OUTPATIENT)
Dept: SLEEP MEDICINE | Facility: CLINIC | Age: 59
End: 2019-11-19
Attending: NURSE PRACTITIONER
Payer: COMMERCIAL

## 2019-11-19 VITALS
RESPIRATION RATE: 16 BRPM | BODY MASS INDEX: 30.55 KG/M2 | HEIGHT: 62 IN | SYSTOLIC BLOOD PRESSURE: 97 MMHG | WEIGHT: 166 LBS | HEART RATE: 80 BPM | DIASTOLIC BLOOD PRESSURE: 63 MMHG | OXYGEN SATURATION: 93 %

## 2019-11-19 DIAGNOSIS — Z94.4 STATUS POST LIVER TRANSPLANTATION (H): ICD-10-CM

## 2019-11-19 DIAGNOSIS — R06.83 SNORING: Primary | ICD-10-CM

## 2019-11-19 DIAGNOSIS — G47.9 SLEEP DISTURBANCE: ICD-10-CM

## 2019-11-19 DIAGNOSIS — E88.01 HETEROZYGOUS ALPHA 1-ANTITRYPSIN DEFICIENCY (H): ICD-10-CM

## 2019-11-19 DIAGNOSIS — R09.02 HYPOXIA: ICD-10-CM

## 2019-11-19 DIAGNOSIS — R06.89 HYPOVENTILATION: ICD-10-CM

## 2019-11-19 PROCEDURE — 99214 OFFICE O/P EST MOD 30 MIN: CPT | Mod: GC | Performed by: STUDENT IN AN ORGANIZED HEALTH CARE EDUCATION/TRAINING PROGRAM

## 2019-11-19 RX ORDER — MYCOPHENOLIC ACID 360 MG/1
360 TABLET, DELAYED RELEASE ORAL DAILY
Qty: 7 TABLET | Refills: 0 | OUTPATIENT
Start: 2019-11-19

## 2019-11-19 ASSESSMENT — MIFFLIN-ST. JEOR: SCORE: 1281.22

## 2019-11-19 NOTE — PATIENT INSTRUCTIONS
"MY TREATMENT INFORMATION FOR SLEEP APNEA-  Nicci Pemberton    DOCTOR : Juan Diego Kulkarni MD  SLEEP CENTER : [facility]    MY CONTACT NUMBER:  [phone] ([place])    Am I having a sleep study at a sleep center?  Make sure you have an appointment for the study before you leave!    Am I having a home sleep study?  Watch this video:  [email or web address]/watch?v=[email or web address]  Please verify your insurance coverage with your insurance carrier    Frequently asked questions:  1. What is Obstructive Sleep Apnea (DIXIE)? DIXIE is the most common type of sleep apnea. Apnea means, \"without breath.\"  Apnea is most often caused by narrowing or collapse of the upper airway as muscles relax during sleep.   Almost everyone has occasional apneas. Most people with sleep apnea have had brief interruptions at night frequently for many years.  The severity of sleep apnea is related to how frequent and severe the events are.   2. What are the consequences of DIXIE? Symptoms include: feeling sleepy during the day, snoring loudly, gasping or stopping of breathing, trouble sleeping, and occasionally morning headaches or heartburn at night.  Sleepiness can be serious and even increase the risk of falling asleep while driving. Other health consequences may include development of high blood pressure and other cardiovascular disease in persons who are susceptible. Untreated DIXIE  can contribute to heart disease, stroke and diabetes.   3. What are the treatment options? In most situations, sleep apnea is a lifelong disease that must be managed with daily therapy. Medications are not effective for sleep apnea and surgery is generally not considered until other therapies have been tried. Your treatment is your choice . Continuous Positive Airway (CPAP) works right away and is the therapy that is effective in nearly everyone. An oral device to hold your jaw forward is usually the next most reliable option. " Other options include postioning devices (to keep you off your back), weight loss, and surgery including a tongue pacing device. There is more detail about some of these options below.    Important tips for using CPAP and similar devices   Know your equipment:  CPAP is continuous positive airway pressure that prevents obstructive sleep apnea by keeping the throat from collapsing while you are sleeping. In most cases, the device is  smart  and can slowly self-adjusts if your throat collapses and keeps a record every day of how well you are treated-this information is available to you and your care team.  BPAP is bilevel positive airway pressure that keeps your throat open and also assists each breath with a pressure boost to maintain adequate breathing.  Special kinds of BPAP are used in patients who have inadequate breathing from lung or heart disease. In most cases, the device is  smart  and can slowly self-adjusts to assist breathing. Like CPAP, the device keeps a record of how well you are treated.  Your mask is your connection to the device. You get to choose what feels most comfortable and the staff will help to make sure if fits. Here: are some examples of the different masks that are available:       Key points to remember on your journey with sleep apnea:  1. Sleep study.  PAP devices often need to be adjusted during a sleep study to show that they are effective and adjusted right.  2. Good tips to remember: Try wearing just the mask during a quiet time during the day so your body adapts to wearing it. A humidifier is recommended for comfort in most cases to prevent drying of your nose and throat. Allergy medication from your provider may help you if you are having nasal congestion.  3. Getting settled-in. It takes more than one night for most of us to get used to wearing a mask. Try wearing just the mask during a quiet time during the day so your body adapts to wearing it. A humidifier is recommended for  comfort in most cases. Our team will work with you carefully on the first day and will be in contact within 4 days and again at 2 and 4 weeks for advice and remote device adjustments. Your therapy is evaluated by the device each day.   4. Use it every night. The more you are able to sleep naturally for 7-8 hours, the more likely you will have good sleep and to prevent health risks or symptoms from sleep apnea. Even if you use it 4 hours it helps. Occasionally all of us are unable to use a medical therapy, in sleep apnea, it is not dangerous to miss one night.   5. Communicate. Call our skilled team on the number provided on the first day if your visit for problems that make it difficult to wear the device. Over 2 out of 3 patients can learn to wear the device long-term with help from our team. Remember to call our team or your sleep providers if you are unable to wear the device as we may have other solutions for those who cannot adapt to mask CPAP therapy. It is recommended that you sleep your sleep provider within the first 3 months and yearly after that if you are not having problems.   6. Use it for your health. We encourage use of CPAP masks during daytime quiet periods to allow your face and brain to adapt to the sensation of CPAP so that it will be a more natural sensation to awaken to at night or during naps. This can be very useful during the first few weeks or months of adapting to CPAP though it does not help medically to wear CPAP during wakefulness and  should not be used as a strategy just to meet guidelines.  7. Take care of your equipment. Make sure you clean your mask and tubing using directions every day and that your filter and mask are replaced as recommended or if they are not working.     BESIDES CPAP, WHAT OTHER THERAPIES ARE THERE?    Positioning Device  Positioning devices are generally used when sleep apnea is mild and only occurs on your back.This example shows a pillow that straps around  the waist. It may be appropriate for those whose sleep study shows milder sleep apnea that occurs primarily when lying flat on one's back. Preliminary studies have shown benefit but effectiveness at home may need to be verified by a home sleep test. These devices are generally not covered by medical insurance.  Examples of devices that maintain sleeping on the back to prevent snoring and mild sleep apnea.    Belt type body positioner  Http://Webshoz[employer]/    Electronic reminder  Http://nightshifttherapy.com/  Http://www.[employer].au/      Oral Appliance  What is oral appliance therapy?  An oral appliance device fits on your teeth at night like a retainer used after having braces. The device is made by a specialized dentist and requires several visits over 1-2 months before a manufactured device is made to fit your teeth and is adjusted to prevent your sleep apnea. Once an oral device is working properly, snoring should be improved. A home sleep test may be recommended at that time if to determine whether the sleep apnea is adequately treated.       Some things to remember:  -Oral devices are often, but not always, covered by your medical insurance. Be sure to check with your insurance provider.   -If you are referred for oral therapy, you will be given a list of specialized dentists to consider or you may choose to visit the Web site of the American Academy of Dental Sleep Medicine  -Oral devices are less likely to work if you have severe sleep apnea or are extremely overweight.     More detailed information  An oral appliance is a small acrylic device that fits over the upper and lower teeth  (similar to a retainer or a mouth guard). This device slightly moves jaw forward, which moves the base of the tongue forward, opens the airway, improves breathing for effective treat snoring and obstructive sleep apnea in perhaps 7 out of 10 people .  The best working devices are custom-made by a dental device   after a mold is made of the teeth 1, 2, 3.  When is an oral appliance indicated?  Oral appliance therapy is recommended as a first-line treatment for patients with primary snoring, mild sleep apnea, and for patients with moderate sleep apnea who prefer appliance therapy to use of CPAP4, 5. Severity of sleep apnea is determined by sleep testing and is based on the number of respiratory events per hour of sleep.   How successful is oral appliance therapy?  The success rate of oral appliance therapy in patients with mild sleep apnea is 75-80% while in patients with moderate sleep apnea it is 50-70%. The chance of success in patients with severe sleep apnea is 40-50%. The research also shows that oral appliances have a beneficial effect on the cardiovascular health of DIXIE patients at the same magnitude as CPAP therapy7.  Oral appliances should be a second-line treatment in cases of severe sleep apnea, but if not completely successful then a combination therapy utilizing CPAP plus oral appliance therapy may be effective. Oral appliances tend to be effective in a broad range of patients although studies show that the patients who have the highest success are females, younger patients, those with milder disease, and less severe obesity. 3, 6.   Finding a dentist that practices dental sleep medicine  Specific training is available through the American Academy of Dental Sleep Medicine for dentists interested in working in the field of sleep. To find a dentist who is educated in the field of sleep and the use of oral appliances, near you, visit the Web site of the American Academy of Dental Sleep Medicine.  References  1. Emmy et al. Objectively measured vs self-reported compliance during oral appliance therapy for sleep-disordered breathing. Chest 2013; 144(5): 1622-1268.  2. David et al. Objective measurement of compliance during oral appliance therapy for sleep-disordered breathing. Thorax 2013; 68(1):  91-96.  3. Rhonda et al. Mandibular advancement devices in 620 men and women with DIXIE and snoring: tolerability and predictors of treatment success. Chest 2004; 125: 2138-4131.  4. Heladio et al. Oral appliances for snoring and DIXIE: a review. Sleep 2006; 29: 244-262.  5. Trever et al. Oral appliance treatment for DIXIE: an update. J Clin Sleep Med 2014; 10(2): 215-227.  6. Lamin et al. Predictors of OSAH treatment outcome. J Dent Res 2007; 86: 7903-1615.    Weight Loss:    Weight loss is a long-term strategy that may improve sleep apnea in some patients.    Weight management is a personal decision and the decision should be based on your interest and the potential benefits.  If you are interested in exploring weight loss strategies, the following discussion covers the impact on weight loss on sleep apnea and the approaches that may be successful.    Being overweight does not necessarily mean you will have health consequences.  Those who have BMI over 35 or over 27 with existing medical conditions carries greater risk.   Weight loss decreases severity of sleep apnea in most people with obesity. For those with mild obesity who have developed snoring with weight gain, even 15-30 pound weight loss can improve and occasionally eliminate sleep apnea.  Structured and life-long dietary and health habits are necessary to lose weight and keep healthier weight levels.     Though there may be significant health benefits from weight loss, long-term weight loss is very difficult to achieve- studies show success with dietary management in less than 10% of people. In addition, substantial weight loss may require years of dietary control and may be difficult if patients have severe obesity. In these cases, surgical management may be considered.  Finally, older individuals who have tolerated obesity without health complications may be less likely to benefit from weight loss strategies.      Your BMI is Body mass index is  30.36 kg/m .  Weight management is a personal decision.  If you are interested in exploring weight loss strategies, the following discussion covers the approaches that may be successful. Body mass index (BMI) is one way to tell whether you are at a healthy weight, overweight, or obese. It measures your weight in relation to your height.  A BMI of 18.5 to 24.9 is in the healthy range. A person with a BMI of 25 to 29.9 is considered overweight, and someone with a BMI of 30 or greater is considered obese. More than two-thirds of American adults are considered overweight or obese.  Being overweight or obese increases the risk for further weight gain. Excess weight may lead to heart disease and diabetes.  Creating and following plans for healthy eating and physical activity may help you improve your health.  Weight control is part of healthy lifestyle and includes exercise, emotional health, and healthy eating habits. Careful eating habits lifelong are the mainstay of weight control. Though there are significant health benefits from weight loss, long-term weight loss with diet alone may be very difficult to achieve- studies show long-term success with dietary management in less than 10% of people. Attaining a healthy weight may be especially difficult to achieve in those with severe obesity. In some cases, medications, devices and surgical management might be considered.  What can you do?  If you are overweight or obese and are interested in methods for weight loss, you should discuss this with your provider.     Consider reducing daily calorie intake by 500 calories.     Keep a food journal.     Avoiding skipping meals, consider cutting portions instead.    Diet combined with exercise helps maintain muscle while optimizing fat loss. Strength training is particularly important for building and maintaining muscle mass. Exercise helps reduce stress, increase energy, and improves fitness. Increasing exercise without diet  control, however, may not burn enough calories to loose weight.       Start walking three days a week 10-20 minutes at a time    Work towards walking thirty minutes five days a week     Eventually, increase the speed of your walking for 1-2 minutes at time    In addition, we recommend that you review healthy lifestyles and methods for weight loss available through the National Institutes of Health patient information sites:  http://win.niddk.nih.gov/publications/index.htm    And look into health and wellness programs that may be available through your health insurance provider, employer, local community center, or [employer].      Surgery:    Surgery for obstructive sleep apnea is considered generally only when other therapies fail to work. Surgery may be discussed with you if you are having a difficult time tolerating CPAP and or when there is an abnormal structure that requires surgical correction.  Nose and throat surgeries often enlarge the airway to prevent collapse.  Most of these surgeries create pain for 1-2 weeks and up to half of the most common surgeries are not effective throughout life.  You should carefully discuss the benefits and drawbacks to surgery with your sleep provider and surgeon to determine if it is the best solution for you.   More information  Surgery for DIXIE is directed at areas that are responsible for narrowing or complete obstruction of the airway during sleep.  There are a wide range of procedures available to enlarge and/or stabilize the airway to prevent blockage of breathing in the three major areas where it can occur: the palate, tongue, and nasal regions.  Successful surgical treatment depends on the accurate identification of the factors responsible for obstructive sleep apnea in each person.  A personalized approach is required because there is no single treatment that works well for everyone.  Because of anatomic variation, consultation with an examination by a sleep surgeon  is a critical first step in determining what surgical options are best for each patient.  In some cases, examination during sedation may be recommended in order to guide the selection of procedures.  Patients will be counseled about risks and benefits as well as the typical recovery course after surgery. Surgery is typically not a cure for a person s DIXIE.  However, surgery will often significantly improve one s DIXIE severity (termed  success rate ).  Even in the absence of a cure, surgery will decrease the cardiovascular risk associated with OSA7; improve overall quality of life8 (sleepiness, functionality, sleep quality, etc).  Palate Procedures:  Patients with DIXIE often have narrowing of their airway in the region of their tonsils and uvula.  The goals of palate procedures are to widen the airway in this region as well as to help the tissues resist collapse.  Modern palate procedure techniques focus on tissue conservation and soft tissue rearrangement, rather than tissue removal.  Often the uvula is preserved in this procedure. Residual sleep apnea is common in patient after pharyngoplasty with an average reduction in sleep apnea events of 33%2.      Tongue Procedures:  ExamWhile patients are awake, the muscles that surround the throat are active and keep this region open for breathing. These muscles relax during sleep, allowing the tongue and other structures to collapse and block breathing.  There are several different tongue procedures available.  Selection of a tongue base procedure depends on characteristics seen on physical exam.  Generally, procedures are aimed at removing bulky tissues in this area or preventing the back of the tongue from falling back during sleep.  Success rates for tongue surgery range from 50-62%3.    Hypoglossal Nerve Stimulation:  Hypoglossal nerve stimulation has recently received approval from the [place] Food and Drug Administration for the treatment of obstructive sleep  apnea.  This is based on research showing that the system was safe and effective in treating sleep apnea6.  Results showed that the median AHI score decreased 68%, from 29.3 to 9.0. This therapy uses an implant system that senses breathing patterns and delivers mild stimulation to airway muscles, which keeps the airway open during sleep.  The system consists of three fully implanted components: a small generator (similar in size to a pacemaker), a breathing sensor, and a stimulation lead.  Using a small handheld remote, a patient turns the therapy on before bed and off upon awakening.    Candidates for this device must be greater than 22 years of age, have moderate to severe DIXIE (AHI between 20-65), BMI less than 32, have tried CPAP/oral appliance without success, and have appropriate upper airway anatomy (determined by a sleep endoscopy performed by Dr. Slaughter).    Hypoglossal Nerve Stimulation Pathway:    The sleep surgeon s office will work with the patient through the insurance prior-authorization process (including communications and appeals).    Nasal Procedures:  Nasal obstruction can interfere with nasal breathing during the day and night.  Studies have shown that relief of nasal obstruction can improve the ability of some patients to tolerate positive airway pressure therapy for obstructive sleep apnea1.  Treatment options include medications such as nasal saline, topical corticosteroid and antihistamine sprays, and oral medications such as antihistamines or decongestants. Non-surgical treatments can include external nasal dilators for selected patients. If these are not successful by themselves, surgery can improve the nasal airway either alone or in combination with these other options.  Combination Procedures:  Combination of surgical procedures and other treatments may be recommended, particularly if patients have more than one area of narrowing or persistent positional disease.  The success rate of  combination surgery ranges from 66-80%2,3.    References  1. Brianna PISANO. The Role of the Nose in Snoring and Obstructive Sleep Apnoea: An Update.  Eur Arch Otorhinolaryngol. 2011; 268: 1365-73.  2.  Alexy SM; Heather JA; Ladonna JR; Pallanch JF; Ashli MB; Kenya SG; Day PERALES. Surgical modifications of the upper airway for obstructive sleep apnea in adults: a systematic review and meta-analysis. SLEEP 2010;33(10):7616-5336. Telma DUNN. Hypopharyngeal surgery in obstructive sleep apnea: an evidence-based medicine review.  Arch Otolaryngol Head Neck Surg. 2006 Feb;132(2):206-13.  3. Harsh YH1, Jenna Y, Sam SURY. The efficacy of anatomically based multilevel surgery for obstructive sleep apnea. Otolaryngol Head Neck Surg. 2003 Oct;129(4):327-35.  4. Telma DUNN, Goldberg A. Hypopharyngeal Surgery in Obstructive Sleep Apnea: An Evidence-Based Medicine Review. Arch Otolaryngol Head Neck Surg. 2006 Feb;132(2):206-13.  5. Strollo PJ et al. Upper-Airway Stimulation for Obstructive Sleep Apnea.  N Engl J Med. 2014 Jan 9;370(2):139-49.  6. Rancho Y et al. Increased Incidence of Cardiovascular Disease in Middle-aged Men with Obstructive Sleep Apnea. Am J Respir Crit Care Med; 2002 166: 159-165  7. Cope EM et al. Studying Life Effects and Effectiveness of Palatopharyngoplasty (SLEEP) study: Subjective Outcomes of Isolated Uvulopalatopharyngoplasty. Otolaryngol Head Neck Surg. 2011; 144: 623-631.  ]      Your BMI is Body mass index is 30.36 kg/m .  Weight management is a personal decision.  If you are interested in exploring weight loss strategies, the following discussion covers the approaches that may be successful. Body mass index (BMI) is one way to tell whether you are at a healthy weight, overweight, or obese. It measures your weight in relation to your height.  A BMI of 18.5 to 24.9 is in the healthy range. A person with a BMI of 25 to 29.9 is considered overweight, and someone with a BMI of 30 or greater is considered  obese. More than two-thirds of American adults are considered overweight or obese.  Being overweight or obese increases the risk for further weight gain. Excess weight may lead to heart disease and diabetes.  Creating and following plans for healthy eating and physical activity may help you improve your health.  Weight control is part of healthy lifestyle and includes exercise, emotional health, and healthy eating habits. Careful eating habits lifelong are the mainstay of weight control. Though there are significant health benefits from weight loss, long-term weight loss with diet alone may be very difficult to achieve- studies show long-term success with dietary management in less than 10% of people. Attaining a healthy weight may be especially difficult to achieve in those with severe obesity. In some cases, medications, devices and surgical management might be considered.  What can you do?  If you are overweight or obese and are interested in methods for weight loss, you should discuss this with your provider.     Consider reducing daily calorie intake by 500 calories.     Keep a food journal.     Avoiding skipping meals, consider cutting portions instead.    Diet combined with exercise helps maintain muscle while optimizing fat loss. Strength training is particularly important for building and maintaining muscle mass. Exercise helps reduce stress, increase energy, and improves fitness. Increasing exercise without diet control, however, may not burn enough calories to loose weight.       Start walking three days a week 10-20 minutes at a time    Work towards walking thirty minutes five days a week     Eventually, increase the speed of your walking for 1-2 minutes at time    In addition, we recommend that you review healthy lifestyles and methods for weight loss available through the National Institutes of Health patient information sites:  http://win.niddk.nih.gov/publications/index.htm    And look into health  and wellness programs that may be available through your health insurance provider, employer, local community center, or [employer].

## 2019-11-19 NOTE — TELEPHONE ENCOUNTER
Reached out to pt to set up clinic visit with motility specialist per Dr. Alford's referral. Pt scheduled.

## 2019-11-21 ENCOUNTER — PATIENT OUTREACH (OUTPATIENT)
Dept: OTOLARYNGOLOGY | Facility: CLINIC | Age: 59
End: 2019-11-21

## 2019-11-21 NOTE — TELEPHONE ENCOUNTER
[facility]   DATE RECEIVED: 2020   NOTES STATUS DETAILS   OFFICE NOTE from referring provider Internal 19 Office visit with Dr. Alford    OFFICE NOTE from other specialist N/A    DISCHARGE SUMMARY from hospital N/A EGD: 10/31/19   OPERATIVE REPORT Internal    MEDICATION LIST Internal         ENDOSCOPY  Internal EGD: 10/31/19  EGD: 18, 18 ([facility])    COLONOSCOPY Internal  18 ([facility])   ERCP N/A    EUS N/A    STOOL TESTING N/A    PERTINENT LABS Internal    PATHOLOGY REPORTS (RELATED) Received/ Internal  EGD: 18  EGD: 10/31/19   IMAGING (CT, MRI, EGD) Internal/ Received NM Gastric Emptyin19 (PACS)  XR Abdomen: 19, 10/4/19 (more images in PACS)  US Abdomen: 10/4/19, 19, 3/20/19, 18 (PACS)  MRI Abdomen: 18 (PACS)    * [facility]:  - CT Abdomen Pelvis: 19 (in PACS)  - NM Gastric Emptyin18 (in PACS)    * [facility]:  - US Abdomen: 17    * [facility]:  - US Abdomen: 8/10/17 (in PACS)  - US Pelvis: 10/25/16 (in PACS)    * image request sent to [facility] ([phone]) and [facility] ([phone]) for images noted above. -ao        REFERRAL INFORMATION    Date referral was placed: 2020    Date all records received: N/A   Date records were scanned into EPIC: N/A   Date records were sent to Provider to review: N/A   Date and recommendation received from provider:  LETTER SENT  SCHEDULE APPOINTMENT   Date patient was contacted to schedule: 2019 1:28pm Received a fax reply from [facility]; AMANDA or verbal authorization is needed from Pt before recs can be release. Will follow up with Pt. -Bhao    19 9:22am Called and LVM for Pt to call back regarding AMANDA. -ao

## 2019-11-21 NOTE — PROGRESS NOTES
Called and spoke with patient as she sent a my-chart message regarding a new lump in her right parotid area similar to her previous one that was treated with antibiotics and then improved. Patient indicates that she is eating lamin 5 times per day which since starting this the lump has gone down in size to a pea size currently. She is requesting an antibiotic from Dr. Berman as this is what improved her symptoms previously.     Discussed with patient that Dr. Berman is in the OR today and I will review with her tomorrow and contact her with updates. Patient was agreeable to plan and was encouraged to call with further questions or concerns.       Update: Dr. Berman approved 10 day course of Augmentin. This was ordered and sent to her pharmacy.     She will continue with plan to follow-up with Dr. Berman in January with repeat US.     Merlyn Carroll, RN, BSN

## 2019-11-22 ENCOUNTER — PATIENT OUTREACH (OUTPATIENT)
Dept: OTOLARYNGOLOGY | Facility: CLINIC | Age: 59
End: 2019-11-22

## 2019-11-22 ENCOUNTER — OFFICE VISIT (OUTPATIENT)
Dept: NEUROLOGY | Facility: CLINIC | Age: 59
End: 2019-11-22
Attending: NURSE PRACTITIONER
Payer: COMMERCIAL

## 2019-11-22 VITALS
OXYGEN SATURATION: 99 % | HEART RATE: 77 BPM | SYSTOLIC BLOOD PRESSURE: 96 MMHG | BODY MASS INDEX: 30.78 KG/M2 | WEIGHT: 168.3 LBS | DIASTOLIC BLOOD PRESSURE: 68 MMHG

## 2019-11-22 DIAGNOSIS — K11.20 SIALADENITIS: Primary | ICD-10-CM

## 2019-11-22 DIAGNOSIS — R42 VERTIGO: Primary | ICD-10-CM

## 2019-11-22 ASSESSMENT — PAIN SCALES - GENERAL: PAINLEVEL: NO PAIN (0)

## 2019-11-22 NOTE — NURSING NOTE
Chief Complaint   Patient presents with     Consult     UMP NEW - LIGHT HEADED WITH CHANGING POSITION, VERTIGO       Shankar Soto, EMT

## 2019-11-22 NOTE — LETTER
RE: Nicci Pemberton  [address] [facility] [address]       Service Date: 2019      Virgie Heart NP   [facility]   [address] [place] [address]  [id number]      RE: Nicci Pemberton   MRN: [id number]   : 1960      Dear Ms. Heart:      Thank you for referring Nicci Pemberton for neurologic consultation on 2019.  The patient is a 59-year-old woman you were kind enough to refer who comes with a chief complaint of an acute episode of vertigo.      The patient said she was getting out of bed on 10/01/2019.  She developed acute severe vertigo.  She said she fell forward but fortunately did not strike her head.  She grabbed her walker.  She hit her left knee.  The patient said that the vertigo was short lived.  It lasted probably less than 1 day.  She thinks she was nauseated with it but did not vomit.  She denied any other symptoms including recent head trauma, hearing loss, upper respiratory tract infections, tinnitus, headache, oscillopsia, diplopia, visual loss, focal weakness or generalized weakness, nor paresthesias.  She was not incontinent of urine or stool.  The patient said that her symptoms were fairly brief.  She recalled that she had a sense of being off kilter after that to a much lesser degree.  She did note that when she did move her head on a pillow the day it happened she probably had vertigo.  She could recall actually feeling something like that 1 month before this spell.  She did recall hanging onto a bar in the shower.  The patient otherwise has not had any recurrent severe episodes.      The patient ended up going to the hospital on 10/04/2019 and was discharged on 10/06/2019.  They reviewed her history.  She had a history of ANGULO and heterozygous alpha-1 antitrypsin deficiency, hypertension and hypothyroidism.  She had undergone  donor liver transplant on 2019.  During that hospitalization, she had complications of  encephalopathy.  She had recently been discharged at home on 10/01/2019.  She and her  both assured me she had recovered from that to a large extent.  On 10/04/2019 she developed severe vertigo and vomiting with standing and presented for evaluation.  The patient did have a head CT scan that was unremarkable.  An MRI scan was done which I reviewed with them also.  This was compared to the head CT of 10/04/2019 and to the MRI scan on 09/03/2019.  This study was done on 10/05/2019.  This showed subcortical white matter involvement symmetrically.  This was thought to be suggestive of acute toxic leukoencephalopathy, which could overlap with both uremic and hepatic encephalopathy.  The patient did have T1 hyperintense globi pallidi suggesting underlying findings of chronic hepatic encephalopathy and manganese deposition.  Her head MRI as well as neck MRA were both basically unremarkable.  The patient did have marked improvement by the day of discharge, according to her medical records.  She was referred for vestibular rehabilitation.  The patient was treated for otitis media based on vertigo.  Dried blood was seen in the right ear, but it was felt later this was probably related to placing a Q-tip there.  She did have nocturnal hypoxemia.  Referral was made for a sleep study.  She required a small amount of oxygen at night.  Her other recommendations were to be seen not only in physical therapy but also in Neurology Clinic and did have inpatient Pharmacology consultation.      I reviewed the records of Dr. Berman in ENT.  The patient was seen on 10/09/2019.  At that time, the physician found no signs of otitis media on exam other than blood in the canal.  She had been started on Augmentin.  She had developed about 4 days before that pain and swelling in the right neck in the tail of the parotid.  It was painful to touch.  The patient did end up having aspiration of that area.  She was thought to have focal  sialadenitis.  She noted that she had already been on p.o. vancomycin for history of Clostridium difficile.  She was instructed in parotid massage, heat packs and use of sialagogues.  The patient did have aspiration on 11/01/2019 of the parotid and was negative for malignancy.      I reviewed the records of Dr. Alford.  She was last seen on 11/04/2019 in Transplant Clinic.  She at that time was struggling to eat and drink fluids.  The patient assured me, as did her , that she is markedly improved though now and has no trouble with eating or drinking.  She also was starting to have bowel movements then.        The patient was noted on that visit 11/04/2019 that she had no rejection or technical problems with the liver allograft.  She was to continue on cyclosporine.  She had mild elevation of her renal function.  They recommended gastric emptying time.  The patient was seen in followup again on 11/18/2019.  At that time, the patient had stopped Valcyte, magnesium, zinc and vitamin C.  She had that area of swelling by a lymph node and was being followed by Dr. Berman.  The patient was to wait for knee replacement to closer to the 1-year transplant anniversary.  She was struggling with mobility and she said this related to her problems with her knees and that is why she was using a rolling walker.  The patient was to wean off CellCept then and keep cyclosporine levels between 150 and 250 mg/dL.      The patient reviewed with me her prior diagnosis of liver issues with cirrhosis, iron overload, spontaneous bacterial peritonitis, acute renal failure after surgery, epistaxis, Clostridium difficile which she has now had resolution of, metabolic encephalopathy which also has resolved, steroid-induced hyperglycemia, hyperkalemia, severe malnutrition, hypoxia, dehydration.  The patient and her  both told me she has been told she did not have otitis media.      The patient did go on to tell me she quit  smoking in 2011.  She has not used alcohol.  She has allergies to sulfa and Lasix.      The patient reviewed with me her mother having had a pacemaker and being alive and her father is 84 and recently in a nursing home with dementia.  She has a brother who has developed atrial fibrillation.  Otherwise, her family history is unremarkable.      The patient did have studies including lumbar puncture on 09/05/2019.      The patient did review with me her current medicines.  She was told to use Excedrin Tension Headache, but she never did use it.  She has used Tylenol if she has needed it but not recently for pain.  She had been on vitamin C and that had been taken off.  She has continued on low-dose aspirin 81 mg, vitamin D, cyclosporine, dapsone, Pepcid and levothyroxine.  She is weaning off CellCept.  The patient did try minocycline.  It did not help.  She is no longer taking zinc or Valcyte.      Neurologic examination revealed a pleasant woman.  Her initial blood pressure was recorded at 99/66 and a pulse of 70 in the seated position.  She then had blood pressure checked in a supine position.  It was 94/59 with a pulse of 70.  Seated after 3 minutes it was 104/60, a pulse of 70.  Standing after 3 minutes it was 110/71 with a pulse of 70.  She had a regular cardiac rhythm without gallops or murmurs.  Her lungs were clear to auscultation.  She did not have cervical bruits.  The patient has a cautious gait because of her painful knees.  She had to concentrate to do tandem.  She had negative Romberg.  Muscle stretch reflexes were absent.  Her toes were downward going to plantar stimulation.  Strength testing was all normal.  The patient's cranial nerve examination was unremarkable.  She had good extraocular movements and full visual fields to confrontation.  Discs were flat.  Tongue and uvula are midline.  She had fluent speech.  She had normal cranial motor strength.  The patient's superficial and cortical sensory  testing was unremarkable including no crossed sensory changes to pain.  The patient had a negative Nylen-Barany test today.  This did not produce any of her symptoms and the patient did tell me she is markedly improved.      IMPRESSION:     1.  Either benign positional vertigo or acute labyrinthitis.   2.  Recent liver transplantation with metabolic encephalopathy post transplant with resolution of those symptoms.      The patient was quite ill after transplantation but seems to have recovered.  She had an episode of acute vertigo and may have had one prior to it.  This would be more in keeping with benign positional vertigo.  She is not symptomatic now.  I showed her and her  how to do the Epley maneuver.  I went over this from UpToDate and also from the Internet.  It showed a model of how to perform the test, which would be similar to what I attempted with a Nylen-Barany test today.  If her symptoms return and are severe, she needs to have immediate evaluation in the hospital.  She is now going to be seen on a p.r.n. basis in the General Neurology Department here at [facility].  She and her  seem to have a good understanding of this and will now be seen on a p.r.n. basis.  I talked with them about other treatments that could be tried if she has recurrent symptoms beside the Epley including turning her head repetitively to one side over 3 times or doing other exercises that could be helpful in this setting.        Sincerely,      Charli Armstrong MD      I spent 1 hour with the patient and her  today.  Over 50% of the time involved counseling and coordination of care.  A complete review of medical systems was done and a positive review is listed in the report above.      D: 2019   T: 2019   MT: cisco      Name:     EARLENE GONZALES   MRN:      [id number]        Account:      [id number]   :      1960           Service Date: 2019      Document: [id number]

## 2019-11-25 LAB
ALBUMIN SERPL-MCNC: 3.5 G/DL (ref 3.4–5)
ALP SERPL-CCNC: 141 U/L (ref 40–150)
ALT SERPL W P-5'-P-CCNC: 24 U/L (ref 0–50)
ANION GAP SERPL CALCULATED.3IONS-SCNC: 7 MMOL/L (ref 3–14)
AST SERPL W P-5'-P-CCNC: 31 U/L (ref 0–45)
BILIRUB DIRECT SERPL-MCNC: 0.5 MG/DL (ref 0–0.2)
BILIRUB SERPL-MCNC: 1.5 MG/DL (ref 0.2–1.3)
BUN SERPL-MCNC: 43 MG/DL (ref 7–30)
CALCIUM SERPL-MCNC: 9.3 MG/DL (ref 8.5–10.1)
CHLORIDE SERPL-SCNC: 108 MMOL/L (ref 94–109)
CO2 SERPL-SCNC: 25 MMOL/L (ref 20–32)
CREAT SERPL-MCNC: 0.93 MG/DL (ref 0.52–1.04)
ERYTHROCYTE [DISTWIDTH] IN BLOOD BY AUTOMATED COUNT: 13.2 % (ref 10–15)
GFR SERPL CREATININE-BSD FRML MDRD: 67 ML/MIN/{1.73_M2}
GLUCOSE SERPL-MCNC: 77 MG/DL (ref 70–99)
HCT VFR BLD AUTO: 27.1 % (ref 35–47)
HGB BLD-MCNC: 8.6 G/DL (ref 11.7–15.7)
MAGNESIUM SERPL-MCNC: 2 MG/DL (ref 1.6–2.3)
MCH RBC QN AUTO: 35 PG (ref 26.5–33)
MCHC RBC AUTO-ENTMCNC: 31.7 G/DL (ref 31.5–36.5)
MCV RBC AUTO: 110 FL (ref 78–100)
PHOSPHATE SERPL-MCNC: 4.1 MG/DL (ref 2.5–4.5)
PLATELET # BLD AUTO: 128 10E9/L (ref 150–450)
POTASSIUM SERPL-SCNC: 4.5 MMOL/L (ref 3.4–5.3)
PROT SERPL-MCNC: 7 G/DL (ref 6.8–8.8)
RBC # BLD AUTO: 2.46 10E12/L (ref 3.8–5.2)
SODIUM SERPL-SCNC: 140 MMOL/L (ref 133–144)
WBC # BLD AUTO: 2.2 10E9/L (ref 4–11)

## 2019-11-25 PROCEDURE — 80048 BASIC METABOLIC PNL TOTAL CA: CPT | Performed by: TRANSPLANT SURGERY

## 2019-11-25 PROCEDURE — 84100 ASSAY OF PHOSPHORUS: CPT | Performed by: TRANSPLANT SURGERY

## 2019-11-25 PROCEDURE — 80158 DRUG ASSAY CYCLOSPORINE: CPT | Performed by: TRANSPLANT SURGERY

## 2019-11-25 PROCEDURE — 85027 COMPLETE CBC AUTOMATED: CPT | Performed by: TRANSPLANT SURGERY

## 2019-11-25 PROCEDURE — 80076 HEPATIC FUNCTION PANEL: CPT | Performed by: TRANSPLANT SURGERY

## 2019-11-25 PROCEDURE — 83735 ASSAY OF MAGNESIUM: CPT | Performed by: TRANSPLANT SURGERY

## 2019-11-26 ENCOUNTER — TELEPHONE (OUTPATIENT)
Dept: TRANSPLANT | Facility: CLINIC | Age: 59
End: 2019-11-26

## 2019-11-26 LAB
CYCLOSPORINE BLD LC/MS/MS-MCNC: 175 UG/L (ref 50–400)
TME LAST DOSE: NORMAL H

## 2019-11-26 NOTE — TELEPHONE ENCOUNTER
Pt to stop myfortic due to delayed gastric emptying. Pt has completed her wean. Pt to repeat labs next week.

## 2019-11-27 ENCOUNTER — TELEPHONE (OUTPATIENT)
Dept: OTOLARYNGOLOGY | Facility: CLINIC | Age: 59
End: 2019-11-27

## 2019-11-29 NOTE — PROGRESS NOTES
Service Date: 2019      Virgie Heart NP   [facility]   [address] [place] [address]  [address]      RE: Nicci Pemberton   MRN: [id number]   : 1960      Dear Ms. Heart:      Thank you for referring Nicci Pemberton for neurologic consultation on 2019.  The patient is a 59-year-old woman you were kind enough to refer who comes with a chief complaint of an acute episode of vertigo.      The patient said she was getting out of bed on 10/01/2019.  She developed acute severe vertigo.  She said she fell forward but fortunately did not strike her head.  She grabbed her walker.  She hit her left knee.  The patient said that the vertigo was short lived.  It lasted probably less than 1 day.  She thinks she was nauseated with it but did not vomit.  She denied any other symptoms including recent head trauma, hearing loss, upper respiratory tract infections, tinnitus, headache, oscillopsia, diplopia, visual loss, focal weakness or generalized weakness, nor paresthesias.  She was not incontinent of urine or stool.  The patient said that her symptoms were fairly brief.  She recalled that she had a sense of being off kilter after that to a much lesser degree.  She did note that when she did move her head on a pillow the day it happened she probably had vertigo.  She could recall actually feeling something like that 1 month before this spell.  She did recall hanging onto a bar in the shower.  The patient otherwise has not had any recurrent severe episodes.      The patient ended up going to the hospital on 10/04/2019 and was discharged on 10/06/2019.  They reviewed her history.  She had a history of ANGULO and heterozygous alpha-1 antitrypsin deficiency, hypertension and hypothyroidism.  She had undergone  donor liver transplant on 2019.  During that hospitalization, she had complications of encephalopathy.  She had recently been discharged at home on 10/01/2019.  She  and her  both assured me she had recovered from that to a large extent.  On 10/04/2019 she developed severe vertigo and vomiting with standing and presented for evaluation.  The patient did have a head CT scan that was unremarkable.  An MRI scan was done which I reviewed with them also.  This was compared to the head CT of 10/04/2019 and to the MRI scan on 09/03/2019.  This study was done on 10/05/2019.  This showed subcortical white matter involvement symmetrically.  This was thought to be suggestive of acute toxic leukoencephalopathy, which could overlap with both uremic and hepatic encephalopathy.  The patient did have T1 hyperintense globi pallidi suggesting underlying findings of chronic hepatic encephalopathy and manganese deposition.  Her head MRI as well as neck MRA were both basically unremarkable.  The patient did have marked improvement by the day of discharge, according to her medical records.  She was referred for vestibular rehabilitation.  The patient was treated for otitis media based on vertigo.  Dried blood was seen in the right ear, but it was felt later this was probably related to placing a Q-tip there.  She did have nocturnal hypoxemia.  Referral was made for a sleep study.  She required a small amount of oxygen at night.  Her other recommendations were to be seen not only in physical therapy but also in Neurology Clinic and did have inpatient Pharmacology consultation.      I reviewed the records of Dr. Berman in ENT.  The patient was seen on 10/09/2019.  At that time, the physician found no signs of otitis media on exam other than blood in the canal.  She had been started on Augmentin.  She had developed about 4 days before that pain and swelling in the right neck in the tail of the parotid.  It was painful to touch.  The patient did end up having aspiration of that area.  She was thought to have focal sialadenitis.  She noted that she had already been on p.o. vancomycin for history of  Clostridium difficile.  She was instructed in parotid massage, heat packs and use of sialagogues.  The patient did have aspiration on 11/01/2019 of the parotid and was negative for malignancy.      I reviewed the records of Dr. Alford.  She was last seen on 11/04/2019 in Transplant Clinic.  She at that time was struggling to eat and drink fluids.  The patient assured me, as did her , that she is markedly improved though now and has no trouble with eating or drinking.  She also was starting to have bowel movements then.        The patient was noted on that visit 11/04/2019 that she had no rejection or technical problems with the liver allograft.  She was to continue on cyclosporine.  She had mild elevation of her renal function.  They recommended gastric emptying time.  The patient was seen in followup again on 11/18/2019.  At that time, the patient had stopped Valcyte, magnesium, zinc and vitamin C.  She had that area of swelling by a lymph node and was being followed by Dr. eBrman.  The patient was to wait for knee replacement to closer to the 1-year transplant anniversary.  She was struggling with mobility and she said this related to her problems with her knees and that is why she was using a rolling walker.  The patient was to wean off CellCept then and keep cyclosporine levels between 150 and 250 mg/dL.      The patient reviewed with me her prior diagnosis of liver issues with cirrhosis, iron overload, spontaneous bacterial peritonitis, acute renal failure after surgery, epistaxis, Clostridium difficile which she has now had resolution of, metabolic encephalopathy which also has resolved, steroid-induced hyperglycemia, hyperkalemia, severe malnutrition, hypoxia, dehydration.  The patient and her  both told me she has been told she did not have otitis media.      The patient did go on to tell me she quit smoking in 2011.  She has not used alcohol.  She has allergies to sulfa and Lasix.       The patient reviewed with me her mother having had a pacemaker and being alive and her father is 84 and recently in a nursing home with dementia.  She has a brother who has developed atrial fibrillation.  Otherwise, her family history is unremarkable.      The patient did have studies including lumbar puncture on 09/05/2019.      The patient did review with me her current medicines.  She was told to use Excedrin Tension Headache, but she never did use it.  She has used Tylenol if she has needed it but not recently for pain.  She had been on vitamin C and that had been taken off.  She has continued on low-dose aspirin 81 mg, vitamin D, cyclosporine, dapsone, Pepcid and levothyroxine.  She is weaning off CellCept.  The patient did try minocycline.  It did not help.  She is no longer taking zinc or Valcyte.      Neurologic examination revealed a pleasant woman.  Her initial blood pressure was recorded at 99/66 and a pulse of 70 in the seated position.  She then had blood pressure checked in a supine position.  It was 94/59 with a pulse of 70.  Seated after 3 minutes it was 104/60, a pulse of 70.  Standing after 3 minutes it was 110/71 with a pulse of 70.  She had a regular cardiac rhythm without gallops or murmurs.  Her lungs were clear to auscultation.  She did not have cervical bruits.  The patient has a cautious gait because of her painful knees.  She had to concentrate to do tandem.  She had negative Romberg.  Muscle stretch reflexes were absent.  Her toes were downward going to plantar stimulation.  Strength testing was all normal.  The patient's cranial nerve examination was unremarkable.  She had good extraocular movements and full visual fields to confrontation.  Discs were flat.  Tongue and uvula are midline.  She had fluent speech.  She had normal cranial motor strength.  The patient's superficial and cortical sensory testing was unremarkable including no crossed sensory changes to pain.  The patient had a  negative Nylen-Barany test today.  This did not produce any of her symptoms and the patient did tell me she is markedly improved.      IMPRESSION:     1.  Either benign positional vertigo or acute labyrinthitis.   2.  Recent liver transplantation with metabolic encephalopathy post transplant with resolution of those symptoms.      The patient was quite ill after transplantation but seems to have recovered.  She had an episode of acute vertigo and may have had one prior to it.  This would be more in keeping with benign positional vertigo.  She is not symptomatic now.  I showed her and her  how to do the Epley maneuver.  I went over this from UpToDate and also from the Internet.  It showed a model of how to perform the test, which would be similar to what I attempted with a Nylen-Barany test today.  If her symptoms return and are severe, she needs to have immediate evaluation in the hospital.  She is now going to be seen on a p.r.n. basis in the General [facility]rology Department [facility].  She and her  seem to have a good understanding of this and will now be seen on a p.r.n. basis.  I talked with them about other treatments that could be tried if she has recurrent symptoms beside the Epley including turning her head repetitively to one side over 3 times or doing other exercises that could be helpful in this setting.        Sincerely,      Joyce Armstrong MD      I spent 1 hour with the patient and her  today.  Over 50% of the time involved counseling and coordination of care.  A complete review of medical systems was done and a positive review is listed in the report above.         JOYCE ARMSTRONG MD             D: 2019   T: 2019   MT: cisco      Name:     EARLENE GONZALES   MRN:      [id number]        Account:      [id number]   :      1960           Service Date: 2019      Document: [id number]

## 2019-12-02 ENCOUNTER — HOSPITAL ENCOUNTER (OUTPATIENT)
Facility: CLINIC | Age: 59
End: 2019-12-02
Attending: TRANSPLANT SURGERY
Payer: COMMERCIAL

## 2019-12-02 ENCOUNTER — TELEPHONE (OUTPATIENT)
Dept: PHARMACY | Facility: CLINIC | Age: 59
End: 2019-12-02

## 2019-12-02 LAB
ALBUMIN SERPL-MCNC: 3.6 G/DL (ref 3.4–5)
ALP SERPL-CCNC: 175 U/L (ref 40–150)
ALT SERPL W P-5'-P-CCNC: 33 U/L (ref 0–50)
ANION GAP SERPL CALCULATED.3IONS-SCNC: 3 MMOL/L (ref 3–14)
AST SERPL W P-5'-P-CCNC: 36 U/L (ref 0–45)
BILIRUB DIRECT SERPL-MCNC: 0.5 MG/DL (ref 0–0.2)
BILIRUB SERPL-MCNC: 1.7 MG/DL (ref 0.2–1.3)
BUN SERPL-MCNC: 25 MG/DL (ref 7–30)
CALCIUM SERPL-MCNC: 9.1 MG/DL (ref 8.5–10.1)
CHLORIDE SERPL-SCNC: 106 MMOL/L (ref 94–109)
CO2 SERPL-SCNC: 26 MMOL/L (ref 20–32)
CREAT SERPL-MCNC: 0.97 MG/DL (ref 0.52–1.04)
ERYTHROCYTE [DISTWIDTH] IN BLOOD BY AUTOMATED COUNT: 12.3 % (ref 10–15)
GFR SERPL CREATININE-BSD FRML MDRD: 63 ML/MIN/{1.73_M2}
GLUCOSE SERPL-MCNC: 84 MG/DL (ref 70–99)
HCT VFR BLD AUTO: 28.1 % (ref 35–47)
HGB BLD-MCNC: 9 G/DL (ref 11.7–15.7)
MAGNESIUM SERPL-MCNC: 1.6 MG/DL (ref 1.6–2.3)
MCH RBC QN AUTO: 34.9 PG (ref 26.5–33)
MCHC RBC AUTO-ENTMCNC: 32 G/DL (ref 31.5–36.5)
MCV RBC AUTO: 109 FL (ref 78–100)
PHOSPHATE SERPL-MCNC: 3.4 MG/DL (ref 2.5–4.5)
PLATELET # BLD AUTO: 107 10E9/L (ref 150–450)
POTASSIUM SERPL-SCNC: 4.2 MMOL/L (ref 3.4–5.3)
PROT SERPL-MCNC: 6.7 G/DL (ref 6.8–8.8)
RBC # BLD AUTO: 2.58 10E12/L (ref 3.8–5.2)
SODIUM SERPL-SCNC: 135 MMOL/L (ref 133–144)
WBC # BLD AUTO: 1.9 10E9/L (ref 4–11)

## 2019-12-02 PROCEDURE — 84100 ASSAY OF PHOSPHORUS: CPT

## 2019-12-02 PROCEDURE — 83735 ASSAY OF MAGNESIUM: CPT

## 2019-12-02 PROCEDURE — 80048 BASIC METABOLIC PNL TOTAL CA: CPT

## 2019-12-02 PROCEDURE — 85027 COMPLETE CBC AUTOMATED: CPT

## 2019-12-02 PROCEDURE — 80076 HEPATIC FUNCTION PANEL: CPT

## 2019-12-02 PROCEDURE — 80158 DRUG ASSAY CYCLOSPORINE: CPT

## 2019-12-02 NOTE — TELEPHONE ENCOUNTER
Clinical Pharmacy Consult:                                                      Transplant Specific:   Date of Transplant: 08/18/2019  Type of Transplant: liver  First Transplant: yes  History of rejection:  no    Immunosuppression Regimen   CSA 150mg qAM & 150mgqPM  Immunosuppressant Levels: 175 Subtherapeutic  Patient specific goal: 200-300  Pt adherent to lab draws: yes  Scr:   Lab Results   Component Value Date    CR 0.93 11/25/2019     Side effects: Hair loss/gain loss on head gain on face    Prophylactic Medications  Antibacterial:  Dapsone 100mg daily  Scheduled Discontinue Date: 6 months    Antifungal: Not needed thus far    Antiviral: completed     Acid Reducer: Famotadine 20mg  BID  Scheduled Reviewed Date: per hx    Thrombosis Prevention: Aspirin 81 mg PO daily  Scheduled Discontinue Date: up to md    Blood Pressure Management  Frequency of home Blood Pressure checks: twice daily  Most recent home BP: 108/68 her machine 118/72 nurse's machine  Patient Blood pressure goal: <140/90  Patient blood pressure at goal:  yes  Hospitalizations/ER visits since last assessment: 0      Med rec/DUR performed: yes  Med Rec Discrepancies: no    Germain Sosa RPH

## 2019-12-03 ENCOUNTER — TELEPHONE (OUTPATIENT)
Dept: TRANSPLANT | Facility: CLINIC | Age: 59
End: 2019-12-03

## 2019-12-03 DIAGNOSIS — Z94.4 STATUS POST LIVER TRANSPLANTATION (H): ICD-10-CM

## 2019-12-03 LAB
CYCLOSPORINE BLD LC/MS/MS-MCNC: 158 UG/L (ref 50–400)
TME LAST DOSE: NORMAL H

## 2019-12-03 RX ORDER — CYCLOSPORINE 100 MG/1
100 CAPSULE, LIQUID FILLED ORAL 2 TIMES DAILY
Qty: 180 CAPSULE | Refills: 3 | Status: SHIPPED | OUTPATIENT
Start: 2019-12-03 | End: 2020-03-06

## 2019-12-03 RX ORDER — CYCLOSPORINE 25 MG/1
CAPSULE, LIQUID FILLED ORAL
Qty: 120 CAPSULE | Refills: 3 | Status: SHIPPED | OUTPATIENT
Start: 2019-12-03 | End: 2020-03-07

## 2019-12-03 NOTE — TELEPHONE ENCOUNTER
DR julien reviewed labs. Wants to increase CSA goal range 200-250.   Pt's level 158. Pt currently taking 150 mg every 12 hours.  Pt to increase to 150 mg AM and 175 mg PM and repeat next week.   Left message for patient.

## 2019-12-03 NOTE — TELEPHONE ENCOUNTER
Pt returned call. She will increase dose.    Patient reports that she doesn't have any stomach pain now being off myfortic. Her weight is stable and no longer losing weight. She is still feeling as her stomach shrunk since it's still hard to eat.

## 2019-12-05 ENCOUNTER — TELEPHONE (OUTPATIENT)
Dept: TRANSPLANT | Facility: CLINIC | Age: 59
End: 2019-12-05

## 2019-12-06 ENCOUNTER — TELEPHONE (OUTPATIENT)
Dept: TRANSPLANT | Facility: CLINIC | Age: 59
End: 2019-12-06

## 2019-12-06 NOTE — TELEPHONE ENCOUNTER
Patient called to ask if she could take fish oil, tumeric and glucosamine per ortho recommendation. Discussed with [facility] pharmacist. Tumeric can cause increased risk with bleeding and increased INR. Patient instructed no tumeric at this time.   Patient may take fish oil and glucosamine. Left detailed message for Nicci.

## 2019-12-10 ENCOUNTER — TELEPHONE (OUTPATIENT)
Dept: TRANSPLANT | Facility: CLINIC | Age: 59
End: 2019-12-10

## 2019-12-10 DIAGNOSIS — Z94.4 LIVER REPLACED BY TRANSPLANT (H): ICD-10-CM

## 2019-12-10 DIAGNOSIS — Z79.899 LONG TERM CURRENT USE OF IMMUNOSUPPRESSIVE DRUG: ICD-10-CM

## 2019-12-10 LAB
ALBUMIN SERPL-MCNC: 3.4 G/DL (ref 3.4–5)
ALP SERPL-CCNC: 132 U/L (ref 40–150)
ALT SERPL W P-5'-P-CCNC: 28 U/L (ref 0–50)
ANION GAP SERPL CALCULATED.3IONS-SCNC: 6 MMOL/L (ref 3–14)
AST SERPL W P-5'-P-CCNC: 14 U/L (ref 0–45)
BILIRUB DIRECT SERPL-MCNC: 0.4 MG/DL (ref 0–0.2)
BILIRUB SERPL-MCNC: 1.6 MG/DL (ref 0.2–1.3)
BUN SERPL-MCNC: 44 MG/DL (ref 7–30)
CALCIUM SERPL-MCNC: 9.1 MG/DL (ref 8.5–10.1)
CHLORIDE SERPL-SCNC: 109 MMOL/L (ref 94–109)
CO2 SERPL-SCNC: 26 MMOL/L (ref 20–32)
CREAT SERPL-MCNC: 0.96 MG/DL (ref 0.52–1.04)
CYCLOSPORINE BLD LC/MS/MS-MCNC: 282 UG/L (ref 50–400)
ERYTHROCYTE [DISTWIDTH] IN BLOOD BY AUTOMATED COUNT: 11.4 % (ref 10–15)
GFR SERPL CREATININE-BSD FRML MDRD: 65 ML/MIN/{1.73_M2}
GLUCOSE SERPL-MCNC: 115 MG/DL (ref 70–99)
HCT VFR BLD AUTO: 31.3 % (ref 35–47)
HGB BLD-MCNC: 9.8 G/DL (ref 11.7–15.7)
MAGNESIUM SERPL-MCNC: 1.7 MG/DL (ref 1.6–2.3)
MCH RBC QN AUTO: 34.4 PG (ref 26.5–33)
MCHC RBC AUTO-ENTMCNC: 31.3 G/DL (ref 31.5–36.5)
MCV RBC AUTO: 110 FL (ref 78–100)
PHOSPHATE SERPL-MCNC: 3 MG/DL (ref 2.5–4.5)
PLATELET # BLD AUTO: 167 10E9/L (ref 150–450)
POTASSIUM SERPL-SCNC: 4.3 MMOL/L (ref 3.4–5.3)
PROT SERPL-MCNC: 6.9 G/DL (ref 6.8–8.8)
RBC # BLD AUTO: 2.85 10E12/L (ref 3.8–5.2)
SODIUM SERPL-SCNC: 141 MMOL/L (ref 133–144)
TME LAST DOSE: NORMAL H
WBC # BLD AUTO: 6.9 10E9/L (ref 4–11)

## 2019-12-10 PROCEDURE — 83735 ASSAY OF MAGNESIUM: CPT | Performed by: TRANSPLANT SURGERY

## 2019-12-10 PROCEDURE — 36415 COLL VENOUS BLD VENIPUNCTURE: CPT | Performed by: TRANSPLANT SURGERY

## 2019-12-10 PROCEDURE — 80158 DRUG ASSAY CYCLOSPORINE: CPT | Performed by: TRANSPLANT SURGERY

## 2019-12-10 PROCEDURE — 80076 HEPATIC FUNCTION PANEL: CPT | Performed by: TRANSPLANT SURGERY

## 2019-12-10 PROCEDURE — 80048 BASIC METABOLIC PNL TOTAL CA: CPT | Performed by: TRANSPLANT SURGERY

## 2019-12-10 PROCEDURE — 84100 ASSAY OF PHOSPHORUS: CPT | Performed by: TRANSPLANT SURGERY

## 2019-12-10 PROCEDURE — 85027 COMPLETE CBC AUTOMATED: CPT | Performed by: TRANSPLANT SURGERY

## 2019-12-10 NOTE — TELEPHONE ENCOUNTER
Patient Call: Voicemail  Date/Time: Dec 10, 2019 1:16:30 PM  Reason for call: Pt left  regarding a return call please connect

## 2019-12-17 DIAGNOSIS — Z79.899 LONG TERM CURRENT USE OF IMMUNOSUPPRESSIVE DRUG: ICD-10-CM

## 2019-12-17 DIAGNOSIS — Z94.4 LIVER REPLACED BY TRANSPLANT (H): ICD-10-CM

## 2019-12-17 LAB
ALBUMIN SERPL-MCNC: 3.3 G/DL (ref 3.4–5)
ALP SERPL-CCNC: 113 U/L (ref 40–150)
ALT SERPL W P-5'-P-CCNC: 20 U/L (ref 0–50)
ANION GAP SERPL CALCULATED.3IONS-SCNC: 4 MMOL/L (ref 3–14)
AST SERPL W P-5'-P-CCNC: 14 U/L (ref 0–45)
BILIRUB DIRECT SERPL-MCNC: 0.5 MG/DL (ref 0–0.2)
BILIRUB SERPL-MCNC: 1.8 MG/DL (ref 0.2–1.3)
BUN SERPL-MCNC: 33 MG/DL (ref 7–30)
CALCIUM SERPL-MCNC: 9.1 MG/DL (ref 8.5–10.1)
CHLORIDE SERPL-SCNC: 108 MMOL/L (ref 94–109)
CO2 SERPL-SCNC: 30 MMOL/L (ref 20–32)
CREAT SERPL-MCNC: 0.96 MG/DL (ref 0.52–1.04)
CYCLOSPORINE BLD LC/MS/MS-MCNC: 305 UG/L (ref 50–400)
ERYTHROCYTE [DISTWIDTH] IN BLOOD BY AUTOMATED COUNT: 11.7 % (ref 10–15)
GFR SERPL CREATININE-BSD FRML MDRD: 65 ML/MIN/{1.73_M2}
GLUCOSE SERPL-MCNC: 86 MG/DL (ref 70–99)
HCT VFR BLD AUTO: 30.6 % (ref 35–47)
HGB BLD-MCNC: 9.6 G/DL (ref 11.7–15.7)
MAGNESIUM SERPL-MCNC: 1.7 MG/DL (ref 1.6–2.3)
MCH RBC QN AUTO: 34.9 PG (ref 26.5–33)
MCHC RBC AUTO-ENTMCNC: 31.4 G/DL (ref 31.5–36.5)
MCV RBC AUTO: 111 FL (ref 78–100)
PHOSPHATE SERPL-MCNC: 3.7 MG/DL (ref 2.5–4.5)
PLATELET # BLD AUTO: 148 10E9/L (ref 150–450)
POTASSIUM SERPL-SCNC: 4.6 MMOL/L (ref 3.4–5.3)
PROT SERPL-MCNC: 6.7 G/DL (ref 6.8–8.8)
RBC # BLD AUTO: 2.75 10E12/L (ref 3.8–5.2)
SODIUM SERPL-SCNC: 142 MMOL/L (ref 133–144)
TME LAST DOSE: NORMAL H
WBC # BLD AUTO: 5.6 10E9/L (ref 4–11)

## 2019-12-17 PROCEDURE — 84100 ASSAY OF PHOSPHORUS: CPT | Performed by: TRANSPLANT SURGERY

## 2019-12-17 PROCEDURE — 80076 HEPATIC FUNCTION PANEL: CPT | Performed by: TRANSPLANT SURGERY

## 2019-12-17 PROCEDURE — 80048 BASIC METABOLIC PNL TOTAL CA: CPT | Performed by: TRANSPLANT SURGERY

## 2019-12-17 PROCEDURE — 85027 COMPLETE CBC AUTOMATED: CPT | Performed by: TRANSPLANT SURGERY

## 2019-12-17 PROCEDURE — 83735 ASSAY OF MAGNESIUM: CPT | Performed by: TRANSPLANT SURGERY

## 2019-12-17 PROCEDURE — 80158 DRUG ASSAY CYCLOSPORINE: CPT | Performed by: TRANSPLANT SURGERY

## 2019-12-17 PROCEDURE — 36415 COLL VENOUS BLD VENIPUNCTURE: CPT | Performed by: TRANSPLANT SURGERY

## 2019-12-20 DIAGNOSIS — Z94.4 STATUS POST LIVER TRANSPLANTATION (H): ICD-10-CM

## 2019-12-20 RX ORDER — FAMOTIDINE 20 MG/1
20 TABLET, FILM COATED ORAL 2 TIMES DAILY
Qty: 60 TABLET | Refills: 3 | Status: SHIPPED | OUTPATIENT
Start: 2019-12-20

## 2019-12-31 DIAGNOSIS — Z79.899 LONG TERM CURRENT USE OF IMMUNOSUPPRESSIVE DRUG: ICD-10-CM

## 2019-12-31 DIAGNOSIS — Z94.4 LIVER REPLACED BY TRANSPLANT (H): ICD-10-CM

## 2019-12-31 LAB
ALBUMIN SERPL-MCNC: 3.3 G/DL (ref 3.4–5)
ALP SERPL-CCNC: 122 U/L (ref 40–150)
ALT SERPL W P-5'-P-CCNC: 19 U/L (ref 0–50)
ANION GAP SERPL CALCULATED.3IONS-SCNC: 4 MMOL/L (ref 3–14)
AST SERPL W P-5'-P-CCNC: 19 U/L (ref 0–45)
BILIRUB DIRECT SERPL-MCNC: 0.4 MG/DL (ref 0–0.2)
BILIRUB SERPL-MCNC: 1.6 MG/DL (ref 0.2–1.3)
BUN SERPL-MCNC: 39 MG/DL (ref 7–30)
CALCIUM SERPL-MCNC: 8.9 MG/DL (ref 8.5–10.1)
CHLORIDE SERPL-SCNC: 109 MMOL/L (ref 94–109)
CO2 SERPL-SCNC: 28 MMOL/L (ref 20–32)
CREAT SERPL-MCNC: 1.07 MG/DL (ref 0.52–1.04)
ERYTHROCYTE [DISTWIDTH] IN BLOOD BY AUTOMATED COUNT: 12.2 % (ref 10–15)
GFR SERPL CREATININE-BSD FRML MDRD: 57 ML/MIN/{1.73_M2}
GLUCOSE SERPL-MCNC: 85 MG/DL (ref 70–99)
HCT VFR BLD AUTO: 30.1 % (ref 35–47)
HGB BLD-MCNC: 9.7 G/DL (ref 11.7–15.7)
MAGNESIUM SERPL-MCNC: 1.5 MG/DL (ref 1.6–2.3)
MCH RBC QN AUTO: 34.5 PG (ref 26.5–33)
MCHC RBC AUTO-ENTMCNC: 32.2 G/DL (ref 31.5–36.5)
MCV RBC AUTO: 107 FL (ref 78–100)
PHOSPHATE SERPL-MCNC: 4 MG/DL (ref 2.5–4.5)
PLATELET # BLD AUTO: 140 10E9/L (ref 150–450)
POTASSIUM SERPL-SCNC: 4.4 MMOL/L (ref 3.4–5.3)
PROT SERPL-MCNC: 6.6 G/DL (ref 6.8–8.8)
RBC # BLD AUTO: 2.81 10E12/L (ref 3.8–5.2)
SODIUM SERPL-SCNC: 141 MMOL/L (ref 133–144)
WBC # BLD AUTO: 3.6 10E9/L (ref 4–11)

## 2019-12-31 PROCEDURE — 85027 COMPLETE CBC AUTOMATED: CPT | Performed by: TRANSPLANT SURGERY

## 2019-12-31 PROCEDURE — 80158 DRUG ASSAY CYCLOSPORINE: CPT | Performed by: TRANSPLANT SURGERY

## 2019-12-31 PROCEDURE — 84100 ASSAY OF PHOSPHORUS: CPT | Performed by: TRANSPLANT SURGERY

## 2019-12-31 PROCEDURE — 83735 ASSAY OF MAGNESIUM: CPT | Performed by: TRANSPLANT SURGERY

## 2019-12-31 PROCEDURE — 80048 BASIC METABOLIC PNL TOTAL CA: CPT | Performed by: TRANSPLANT SURGERY

## 2019-12-31 PROCEDURE — 36415 COLL VENOUS BLD VENIPUNCTURE: CPT | Performed by: TRANSPLANT SURGERY

## 2019-12-31 PROCEDURE — 80076 HEPATIC FUNCTION PANEL: CPT | Performed by: TRANSPLANT SURGERY

## 2020-01-01 LAB
CYCLOSPORINE BLD LC/MS/MS-MCNC: 271 UG/L (ref 50–400)
TME LAST DOSE: NORMAL H

## 2020-01-03 ENCOUNTER — TELEPHONE (OUTPATIENT)
Dept: TRANSPLANT | Facility: CLINIC | Age: 60
End: 2020-01-03

## 2020-01-03 NOTE — TELEPHONE ENCOUNTER
Pt is eating well and able to drink water without feeling full. Pt would like to cancel her GI appt. Pt's symptoms improved once being off myfortic.

## 2020-01-06 ENCOUNTER — TELEPHONE (OUTPATIENT)
Dept: TRANSPLANT | Facility: CLINIC | Age: 60
End: 2020-01-06

## 2020-01-06 NOTE — TELEPHONE ENCOUNTER
Pt reading in  Her MyChart  There is an order for an Arterial Blood gas ordered on 11/2019  Does she need that done  Or have it deleted from orders  Call her on Cell #   ([phone] independent

## 2020-01-06 NOTE — TELEPHONE ENCOUNTER
Pt calls and wanted to know if arterial blood gases is something she should pursue. There is an order in pt's chart that expires this month. Informed pt that the order was originated from the Sleep center and that she should check with them. Pt states that she cancelled her sleep consult and doesn't feel that it is necessary as she has no further problems from the time that the consult originated. TAMIKA.

## 2020-01-07 ENCOUNTER — TELEPHONE (OUTPATIENT)
Dept: TRANSPLANT | Facility: CLINIC | Age: 60
End: 2020-01-07

## 2020-01-07 DIAGNOSIS — E43 SEVERE MALNUTRITION (H): ICD-10-CM

## 2020-01-07 RX ORDER — CHOLECALCIFEROL (VITAMIN D3) 25 MCG
1000 CAPSULE ORAL 2 TIMES DAILY
Qty: 60 CAPSULE | Refills: 1 | Status: SHIPPED | OUTPATIENT
Start: 2020-01-07 | End: 2024-07-29

## 2020-01-07 NOTE — PROGRESS NOTES
"Dear Virgie Heart:    I had the pleasure of seeing Nicci Pemberton in follow-up today at the [facility].     History of Present Illness:   Nicci Pemberton is a 59 year old woman who was referred urgently for evaluation of parotid mass on 10/9/2019. She has a history of a liver transplant about 2 months ago. She was recently admitted to the hospital and diagnosed with \"otitis media\" of the right ear. On review of notes and discussion with the patient, she had no signs of otitis media on exam other than blood in the canal. Around 10/5/2019 she developed pain and swelling in her right neck (tail of parotid) which was progressive in nature. She had a CT scan on the day of evaluation which showed what appeared to be a potential area of sialadenitis in the right parotid. She was started on augmentin, sialagogues, massage, warm compresses and had significant improvement in her symptoms.    Interval history:  She comes in today for follow-up. She was last seen 10/30/2019. She had an FNA performed at that time due to her repeat U/S showing a residual 1.2 x 0.7 x 1.2 cm right parotid mass. FNA was negative for malignancy but had scant cellularity. She called in about a month ago with some recurrent swelling, given another course of antibiotics. She said that the area resolved after the antibiotics and has not recurred. She has no pain. She has no ear pain. She had a repeat U/S today which was negative.      MEDICATIONS:     Current Outpatient Medications   Medication Sig Dispense Refill     aspirin (ASA) 81 MG tablet Take 1 tablet (81 mg) by mouth daily 90 tablet 3     Cholecalciferol (VITAMIN D-3) 25 MCG (1000 UT) CAPS Take 1,000 Units by mouth 2 times daily 60 capsule 1     cycloSPORINE modified (GENERIC EQUIVALENT) 100 MG capsule Take 1 capsule (100 mg) by mouth 2 times daily *150 mg AM and 175 mg  capsule 3     cycloSPORINE modified (GENERIC EQUIVALENT) 25 MG capsule Take 2 capsules " (50 mg) by mouth every morning AND 3 capsules (75 mg) every evening. *total dose of 150 mg AM and 175 mg  capsule 3     dapsone (ACZONE) 100 MG tablet Take 1 tablet (100 mg) by mouth daily 90 tablet 1     famotidine (PEPCID) 20 MG tablet Take 1 tablet (20 mg) by mouth 2 times daily 60 tablet 3     levothyroxine (SYNTHROID/LEVOTHROID) 150 MCG tablet Take 1 tablet (150 mcg) by mouth daily         ALLERGIES:    Allergies   Allergen Reactions     Food      Fruits with cores and pits  Apples     Sulfa Drugs Unknown     Swollen joints     Furosemide Rash       HABITS/SOCIAL HISTORY:     Former smoker    Social History     Socioeconomic History     Marital status:      Spouse name: Not on file     Number of children: Not on file     Years of education: Not on file     Highest education level: Not on file   Occupational History     Not on file   Social Needs     Financial resource strain: Not on file     Food insecurity:     Worry: Not on file     Inability: Not on file     Transportation needs:     Medical: Not on file     Non-medical: Not on file   Tobacco Use     Smoking status: Former Smoker     Packs/day: 0.00     Last attempt to quit:      Years since quittin.0     Smokeless tobacco: Never Used   Substance and Sexual Activity     Alcohol use: No     Drug use: No     Sexual activity: Not on file   Lifestyle     Physical activity:     Days per week: Not on file     Minutes per session: Not on file     Stress: Not on file   Relationships     Social connections:     Talks on phone: Not on file     Gets together: Not on file     Attends Protestant service: Not on file     Active member of club or organization: Not on file     Attends meetings of clubs or organizations: Not on file     Relationship status: Not on file     Intimate partner violence:     Fear of current or ex partner: Not on file     Emotionally abused: Not on file     Physically abused: Not on file     Forced sexual activity: Not  on file   Other Topics Concern     Parent/sibling w/ CABG, MI or angioplasty before 65F 55M? Not Asked   Social History Narrative     Not on file       PAST MEDICAL HISTORY:   Past Medical History:   Diagnosis Date     Anemia      Cellulitis      Cirrhosis of liver (H) 2018     Heterozygous alpha 1-antitrypsin deficiency (H)      High hepatic iron concentration determined by biopsy of liver      Liver cirrhosis secondary to ANGULO (H)      Liver transplanted (H) 2019     Lymphedema      Osteoarthritis      SBP (spontaneous bacterial peritonitis) (H) 2019 in [place]        PAST SURGICAL HISTORY:   Past Surgical History:   Procedure Laterality Date     BENCH LIVER N/A 2019    Procedure: BACKBENCH PREPARATION, LIVER;  Surgeon: Mandeep Alford MD;  Location: [facility] OR     COLONOSCOPY N/A 2018    Procedure: COLONOSCOPY;  colonoscopy;  Surgeon: Hugo Patel MD;  [facility]     ESOPHAGOSCOPY, GASTROSCOPY, DUODENOSCOPY (EGD), COMBINED N/A 10/31/2019    Procedure: Esophagogastroduodenoscopy, With Biopsy;  Surgeon: Nerissa Aranda MD;  Location: [facility] GI     IR FEEDING TUBE PLACEMENT W MOHAN/MD  2019     IR FLUORO 0-1 HOUR  2019     IR LUMBAR PUNCTURE  2019     TRANSPLANT LIVER RECIPIENT  DONOR N/A 2019    Procedure: TRANSPLANT, LIVER, RECIPIENT,  DONOR;  Surgeon: Mandeep Alford MD;  Location: [facility] OR       FAMILY HISTORY:    Family History   Problem Relation Age of Onset     Cirrhosis No family hx of      Liver Cancer No family hx of        REVIEW OF SYSTEMS:  12 point ROS was negative other than the symptoms noted above in the HPI.  Patient Supplied Answers to Review of Systems  [facility] ROS 1/3/2020   Musculoskeletal Sore or stiff joints         PHYSICAL EXAMINATION:   /78   Pulse 68   Wt 78.5 kg (173 lb 1.6 oz)   BMI 31.66 kg/m     Appearance:   normal; NAD, age-appropriate appearance, well-developed, normal habitus    Communication:   normal; communicates verbally, normal voice quality   Head/Face:   inspection -  Normal; no scars or visible lesions   Palpation - not tender along tail of right parotid   Salivary glands -  No palpable masses, unable to express saliva from right duct today but no pus   Skin:  no skin lesions, no cellulitis   Ears:  auricle (AD) -  Normal  EAC (AD) - normal  TM (AD) - normal  auricle (AS) -  Normal  EAC (AS) - normal  TM (AS) - normal  Normal clinical speech reception   Nose:  Ext. inspection -  Normal   Oral Cavity:  lips -  Normal mucosa, oral competence, and stoma size   Age-appropriate dentition   Oropharynx: Normal tonsil tissue   Neck: No visible mass or asymmetry   Normal range of motion   Cardiovascular:  warm, pink, well-perfused extremities without swelling, tenderness, or edema   Respiratory:  Normal respiratory effort, no stridor   Neuro/Psych.:  mood/affect -  normal  mental status -  normal        RESULTS REVIEWED:      U/S:  Findings: Right parotid gland measured as 5.1 x 1.7 x 1.7 cm, left  parotid gland measured as 5.3 x 2.1 x 1.6 cm. Previously seen  heterogeneous appearance in the inferior superficial aspect of the  right parotid gland is no longer present. Few nonenlarged reactive  lymph nodes with visible fatty hilum adjacent to right parotid gland.  No focal mass in the left parotid gland.                                                                      Impression: Complete resolution of previously seen heterogeneous  appearance in the right parotid gland.      IMPRESSION AND PLAN:   Nicci Pemberton is a 59 year old woman with a history of a recent liver transplant who had a right parotid mass with symptoms consistent with a sialadenitis. Things drastically improved with conservative medical management with antibiotics, sialagogues, warm compresses, massage. After treatment of the acute infection she had a smaller residual parotid mass. She had a repeat U/S today which was  negative. She has no residual symptoms. F/u PRN    Thank you very much for the opportunity to participate in the care of your patient.      Faustina Berman MD, M.D.  Otolaryngology- Head & Neck Surgery      This note was dictated with voice recognition software and then edited. Please excuse any unintentional errors.         CC:  Virgie Heart NP  [address] [facility] [address]

## 2020-01-07 NOTE — TELEPHONE ENCOUNTER
Pt calls wanting to have the vitamin D script filled through [facility] location as pt will be @ the [facility] for an appt. Informed pt that [facility] will fill it for one more month and one fill and have the script filled by PCP thereafter. Pt understood.

## 2020-01-08 ENCOUNTER — ANCILLARY PROCEDURE (OUTPATIENT)
Dept: ULTRASOUND IMAGING | Facility: CLINIC | Age: 60
End: 2020-01-08
Attending: OTOLARYNGOLOGY
Payer: COMMERCIAL

## 2020-01-08 ENCOUNTER — OFFICE VISIT (OUTPATIENT)
Dept: OTOLARYNGOLOGY | Facility: CLINIC | Age: 60
End: 2020-01-08
Payer: COMMERCIAL

## 2020-01-08 VITALS
SYSTOLIC BLOOD PRESSURE: 119 MMHG | BODY MASS INDEX: 31.66 KG/M2 | WEIGHT: 173.1 LBS | HEART RATE: 68 BPM | DIASTOLIC BLOOD PRESSURE: 78 MMHG

## 2020-01-08 DIAGNOSIS — K11.20 SIALADENITIS: Primary | ICD-10-CM

## 2020-01-08 DIAGNOSIS — K11.8 PAROTID MASS: ICD-10-CM

## 2020-01-08 ASSESSMENT — PAIN SCALES - GENERAL: PAINLEVEL: NO PAIN (0)

## 2020-01-08 NOTE — NURSING NOTE
Chief Complaint   Patient presents with     Follow Up     TO DISCUSS IMAGING      Blood pressure 119/78, pulse 68, weight 78.5 kg (173 lb 1.6 oz), not currently breastfeeding.    Kisha Mcintyre LPN

## 2020-01-08 NOTE — LETTER
"1/8/2020       RE: Nicci Pemberton  [address] [facility] [address]     Dear Colleague,    Thank you for referring your patient, Nicci Pemberton, to the [facility] at [facility]. Please see a copy of my visit note below.    Dear Virgie Heart:    I had the pleasure of seeing Nicci Pemberton in follow-up today at the [facility].     History of Present Illness:   Nicci Pemberton is a 59 year old woman who was referred urgently for evaluation of parotid mass on 10/9/2019. She has a history of a liver transplant about 2 months ago. She was recently admitted to the hospital and diagnosed with \"otitis media\" of the right ear. On review of notes and discussion with the patient, she had no signs of otitis media on exam other than blood in the canal. Around 10/5/2019 she developed pain and swelling in her right neck (tail of parotid) which was progressive in nature. She had a CT scan on the day of evaluation which showed what appeared to be a potential area of sialadenitis in the right parotid. She was started on augmentin, sialagogues, massage, warm compresses and had significant improvement in her symptoms.    Interval history:  She comes in today for follow-up. She was last seen 10/30/2019. She had an FNA performed at that time due to her repeat U/S showing a residual 1.2 x 0.7 x 1.2 cm right parotid mass. FNA was negative for malignancy but had scant cellularity. She called in about a month ago with some recurrent swelling, given another course of antibiotics. She said that the area resolved after the antibiotics and has not recurred. She has no pain. She has no ear pain. She had a repeat U/S today which was negative.      MEDICATIONS:     Current Outpatient Medications   Medication Sig Dispense Refill     aspirin (ASA) 81 MG tablet Take 1 tablet (81 mg) by mouth daily 90 tablet 3     Cholecalciferol (VITAMIN D-3) 25 MCG (1000 " UT) CAPS Take 1,000 Units by mouth 2 times daily 60 capsule 1     cycloSPORINE modified (GENERIC EQUIVALENT) 100 MG capsule Take 1 capsule (100 mg) by mouth 2 times daily *150 mg AM and 175 mg  capsule 3     cycloSPORINE modified (GENERIC EQUIVALENT) 25 MG capsule Take 2 capsules (50 mg) by mouth every morning AND 3 capsules (75 mg) every evening. *total dose of 150 mg AM and 175 mg  capsule 3     dapsone (ACZONE) 100 MG tablet Take 1 tablet (100 mg) by mouth daily 90 tablet 1     famotidine (PEPCID) 20 MG tablet Take 1 tablet (20 mg) by mouth 2 times daily 60 tablet 3     levothyroxine (SYNTHROID/LEVOTHROID) 150 MCG tablet Take 1 tablet (150 mcg) by mouth daily         ALLERGIES:    Allergies   Allergen Reactions     Food      Fruits with cores and pits  Apples     Sulfa Drugs Unknown     Swollen joints     Furosemide Rash       HABITS/SOCIAL HISTORY:     Former smoker    Social History     Socioeconomic History     Marital status:      Spouse name: Not on file     Number of children: Not on file     Years of education: Not on file     Highest education level: Not on file   Occupational History     Not on file   Social Needs     Financial resource strain: Not on file     Food insecurity:     Worry: Not on file     Inability: Not on file     Transportation needs:     Medical: Not on file     Non-medical: Not on file   Tobacco Use     Smoking status: Former Smoker     Packs/day: 0.00     Last attempt to quit:      Years since quittin.0     Smokeless tobacco: Never Used   Substance and Sexual Activity     Alcohol use: No     Drug use: No     Sexual activity: Not on file   Lifestyle     Physical activity:     Days per week: Not on file     Minutes per session: Not on file     Stress: Not on file   Relationships     Social connections:     Talks on phone: Not on file     Gets together: Not on file     Attends Caodaism service: Not on file     Active member of club or organization: Not  on file     Attends meetings of clubs or organizations: Not on file     Relationship status: Not on file     Intimate partner violence:     Fear of current or ex partner: Not on file     Emotionally abused: Not on file     Physically abused: Not on file     Forced sexual activity: Not on file   Other Topics Concern     Parent/sibling w/ CABG, MI or angioplasty before 65F 55M? Not Asked   Social History Narrative     Not on file       PAST MEDICAL HISTORY:   Past Medical History:   Diagnosis Date     Anemia      Cellulitis      Cirrhosis of liver (H) 2018     Heterozygous alpha 1-antitrypsin deficiency (H)      High hepatic iron concentration determined by biopsy of liver      Liver cirrhosis secondary to ANGULO (H)      Liver transplanted (H) 2019     Lymphedema      Osteoarthritis      SBP (spontaneous bacterial peritonitis) (H) 2019 in [place]        PAST SURGICAL HISTORY:   Past Surgical History:   Procedure Laterality Date     BENCH LIVER N/A 2019    Procedure: BACKBENCH PREPARATION, LIVER;  Surgeon: Mandeep Alford MD;  Location: [facility] OR     COLONOSCOPY N/A 2018    Procedure: COLONOSCOPY;  colonoscopy;  Surgeon: Hugo Patel MD;  [facility]     ESOPHAGOSCOPY, GASTROSCOPY, DUODENOSCOPY (EGD), COMBINED N/A 10/31/2019    Procedure: Esophagogastroduodenoscopy, With Biopsy;  Surgeon: Nerissa Aranda MD;  Location: [facility] GI     IR FEEDING TUBE PLACEMENT W MOHAN/MD  2019     IR FLUORO 0-1 HOUR  2019     IR LUMBAR PUNCTURE  2019     TRANSPLANT LIVER RECIPIENT  DONOR N/A 2019    Procedure: TRANSPLANT, LIVER, RECIPIENT,  DONOR;  Surgeon: Mandeep Alford MD;  Location: [facility] OR       FAMILY HISTORY:    Family History   Problem Relation Age of Onset     Cirrhosis No family hx of      Liver Cancer No family hx of        REVIEW OF SYSTEMS:  12 point ROS was negative other than the symptoms noted above in the HPI.  Patient Supplied  Answers to Review of Systems   ENT ROS 1/3/2020   Musculoskeletal Sore or stiff joints         PHYSICAL EXAMINATION:   /78   Pulse 68   Wt 78.5 kg (173 lb 1.6 oz)   BMI 31.66 kg/m      Appearance:   normal; NAD, age-appropriate appearance, well-developed, normal habitus   Communication:   normal; communicates verbally, normal voice quality   Head/Face:   inspection -  Normal; no scars or visible lesions   Palpation - not tender along tail of right parotid   Salivary glands -  No palpable masses, unable to express saliva from right duct today but no pus   Skin:  no skin lesions, no cellulitis   Ears:  auricle (AD) -  Normal  EAC (AD) - normal  TM (AD) - normal  auricle (AS) -  Normal  EAC (AS) - normal  TM (AS) - normal  Normal clinical speech reception   Nose:  Ext. inspection -  Normal   Oral Cavity:  lips -  Normal mucosa, oral competence, and stoma size   Age-appropriate dentition   Oropharynx: Normal tonsil tissue   Neck: No visible mass or asymmetry   Normal range of motion   Cardiovascular:  warm, pink, well-perfused extremities without swelling, tenderness, or edema   Respiratory:  Normal respiratory effort, no stridor   Neuro/Psych.:  mood/affect -  normal  mental status -  normal        RESULTS REVIEWED:      U/S:  Findings: Right parotid gland measured as 5.1 x 1.7 x 1.7 cm, left  parotid gland measured as 5.3 x 2.1 x 1.6 cm. Previously seen  heterogeneous appearance in the inferior superficial aspect of the  right parotid gland is no longer present. Few nonenlarged reactive  lymph nodes with visible fatty hilum adjacent to right parotid gland.  No focal mass in the left parotid gland.                                                                      Impression: Complete resolution of previously seen heterogeneous  appearance in the right parotid gland.      IMPRESSION AND PLAN:   Nicci Pemberton is a 59 year old woman with a history of a recent liver transplant who had a right parotid mass with  symptoms consistent with a sialadenitis. Things drastically improved with conservative medical management with antibiotics, sialagogues, warm compresses, massage. After treatment of the acute infection she had a smaller residual parotid mass. She had a repeat U/S today which was negative. She has no residual symptoms. F/u PRN    Thank you very much for the opportunity to participate in the care of your patient.      Faustina Berman MD, M.D.  Otolaryngology- Head & Neck Surgery      This note was dictated with voice recognition software and then edited. Please excuse any unintentional errors.         CC:  Virgie Heart, ROBERTO  [address] [facility] [address]

## 2020-01-13 DIAGNOSIS — Z79.899 LONG TERM CURRENT USE OF IMMUNOSUPPRESSIVE DRUG: ICD-10-CM

## 2020-01-13 DIAGNOSIS — Z94.4 LIVER REPLACED BY TRANSPLANT (H): ICD-10-CM

## 2020-01-13 LAB
ALBUMIN SERPL-MCNC: 3.5 G/DL (ref 3.4–5)
ALP SERPL-CCNC: 117 U/L (ref 40–150)
ALT SERPL W P-5'-P-CCNC: 20 U/L (ref 0–50)
ANION GAP SERPL CALCULATED.3IONS-SCNC: 5 MMOL/L (ref 3–14)
AST SERPL W P-5'-P-CCNC: 22 U/L (ref 0–45)
BILIRUB DIRECT SERPL-MCNC: 0.4 MG/DL (ref 0–0.2)
BILIRUB SERPL-MCNC: 2.3 MG/DL (ref 0.2–1.3)
BUN SERPL-MCNC: 49 MG/DL (ref 7–30)
CALCIUM SERPL-MCNC: 9.1 MG/DL (ref 8.5–10.1)
CHLORIDE SERPL-SCNC: 108 MMOL/L (ref 94–109)
CO2 SERPL-SCNC: 27 MMOL/L (ref 20–32)
CREAT SERPL-MCNC: 1.11 MG/DL (ref 0.52–1.04)
ERYTHROCYTE [DISTWIDTH] IN BLOOD BY AUTOMATED COUNT: 11.8 % (ref 10–15)
GFR SERPL CREATININE-BSD FRML MDRD: 54 ML/MIN/{1.73_M2}
GLUCOSE SERPL-MCNC: 76 MG/DL (ref 70–99)
HCT VFR BLD AUTO: 29 % (ref 35–47)
HGB BLD-MCNC: 9.4 G/DL (ref 11.7–15.7)
MAGNESIUM SERPL-MCNC: 1.9 MG/DL (ref 1.6–2.3)
MCH RBC QN AUTO: 34.6 PG (ref 26.5–33)
MCHC RBC AUTO-ENTMCNC: 32.4 G/DL (ref 31.5–36.5)
MCV RBC AUTO: 107 FL (ref 78–100)
PHOSPHATE SERPL-MCNC: 4.6 MG/DL (ref 2.5–4.5)
PLATELET # BLD AUTO: 123 10E9/L (ref 150–450)
POTASSIUM SERPL-SCNC: 4.7 MMOL/L (ref 3.4–5.3)
PROT SERPL-MCNC: 6.9 G/DL (ref 6.8–8.8)
RBC # BLD AUTO: 2.72 10E12/L (ref 3.8–5.2)
SODIUM SERPL-SCNC: 140 MMOL/L (ref 133–144)
WBC # BLD AUTO: 3.8 10E9/L (ref 4–11)

## 2020-01-13 PROCEDURE — 83735 ASSAY OF MAGNESIUM: CPT | Performed by: TRANSPLANT SURGERY

## 2020-01-13 PROCEDURE — 80048 BASIC METABOLIC PNL TOTAL CA: CPT | Performed by: TRANSPLANT SURGERY

## 2020-01-13 PROCEDURE — 80076 HEPATIC FUNCTION PANEL: CPT | Performed by: TRANSPLANT SURGERY

## 2020-01-13 PROCEDURE — 85027 COMPLETE CBC AUTOMATED: CPT | Performed by: TRANSPLANT SURGERY

## 2020-01-13 PROCEDURE — 36415 COLL VENOUS BLD VENIPUNCTURE: CPT | Performed by: TRANSPLANT SURGERY

## 2020-01-13 PROCEDURE — 84100 ASSAY OF PHOSPHORUS: CPT | Performed by: TRANSPLANT SURGERY

## 2020-01-13 PROCEDURE — 80158 DRUG ASSAY CYCLOSPORINE: CPT | Performed by: TRANSPLANT SURGERY

## 2020-01-14 ENCOUNTER — PRE VISIT (OUTPATIENT)
Dept: GASTROENTEROLOGY | Facility: CLINIC | Age: 60
End: 2020-01-14

## 2020-01-14 LAB
CYCLOSPORINE BLD LC/MS/MS-MCNC: 217 UG/L (ref 50–400)
TME LAST DOSE: NORMAL H

## 2020-01-16 ENCOUNTER — TELEPHONE (OUTPATIENT)
Dept: TRANSPLANT | Facility: CLINIC | Age: 60
End: 2020-01-16

## 2020-01-16 DIAGNOSIS — Z94.4 S/P LIVER TRANSPLANT (H): Primary | ICD-10-CM

## 2020-01-16 NOTE — TELEPHONE ENCOUNTER
Instructed pt the need to have a peripheral smear drawn with next lab work. Pt will remind the phlebotomist.

## 2020-01-16 NOTE — TELEPHONE ENCOUNTER
Per Dr. Leventhal:    Leventhal, Thomas Michael, MD sent to Zina Dunham RN             Her Cr is up because of her cyclosporine levels     Lets get a peripheral smear to rule out hemolysis

## 2020-01-21 DIAGNOSIS — Z94.4 LIVER REPLACED BY TRANSPLANT (H): ICD-10-CM

## 2020-01-21 DIAGNOSIS — Z79.899 LONG TERM CURRENT USE OF IMMUNOSUPPRESSIVE DRUG: ICD-10-CM

## 2020-01-21 DIAGNOSIS — Z94.4 S/P LIVER TRANSPLANT (H): ICD-10-CM

## 2020-01-21 LAB
ALBUMIN SERPL-MCNC: 3.4 G/DL (ref 3.4–5)
ALP SERPL-CCNC: 145 U/L (ref 40–150)
ALT SERPL W P-5'-P-CCNC: 37 U/L (ref 0–50)
ANION GAP SERPL CALCULATED.3IONS-SCNC: 4 MMOL/L (ref 3–14)
AST SERPL W P-5'-P-CCNC: 32 U/L (ref 0–45)
BASOPHILS # BLD AUTO: 0 10E9/L (ref 0–0.2)
BASOPHILS NFR BLD AUTO: 0 %
BILIRUB DIRECT SERPL-MCNC: 0.5 MG/DL (ref 0–0.2)
BILIRUB SERPL-MCNC: 2.1 MG/DL (ref 0.2–1.3)
BUN SERPL-MCNC: 41 MG/DL (ref 7–30)
CALCIUM SERPL-MCNC: 8.8 MG/DL (ref 8.5–10.1)
CHLORIDE SERPL-SCNC: 110 MMOL/L (ref 94–109)
CO2 SERPL-SCNC: 28 MMOL/L (ref 20–32)
CREAT SERPL-MCNC: 1.03 MG/DL (ref 0.52–1.04)
CYCLOSPORINE BLD LC/MS/MS-MCNC: 257 UG/L (ref 50–400)
DIFFERENTIAL METHOD BLD: ABNORMAL
EOSINOPHIL # BLD AUTO: 0.3 10E9/L (ref 0–0.7)
EOSINOPHIL NFR BLD AUTO: 7.6 %
ERYTHROCYTE [DISTWIDTH] IN BLOOD BY AUTOMATED COUNT: 12.3 % (ref 10–15)
GFR SERPL CREATININE-BSD FRML MDRD: 59 ML/MIN/{1.73_M2}
GLUCOSE SERPL-MCNC: 94 MG/DL (ref 70–99)
HCT VFR BLD AUTO: 27.4 % (ref 35–47)
HGB BLD-MCNC: 8.8 G/DL (ref 11.7–15.7)
LYMPHOCYTES # BLD AUTO: 0.9 10E9/L (ref 0.8–5.3)
LYMPHOCYTES NFR BLD AUTO: 23 %
MAGNESIUM SERPL-MCNC: 1.7 MG/DL (ref 1.6–2.3)
MCH RBC QN AUTO: 34.5 PG (ref 26.5–33)
MCHC RBC AUTO-ENTMCNC: 32.1 G/DL (ref 31.5–36.5)
MCV RBC AUTO: 108 FL (ref 78–100)
MONOCYTES # BLD AUTO: 0.6 10E9/L (ref 0–1.3)
MONOCYTES NFR BLD AUTO: 13.5 %
NEUTROPHILS # BLD AUTO: 2.3 10E9/L (ref 1.6–8.3)
NEUTROPHILS NFR BLD AUTO: 55.9 %
PHOSPHATE SERPL-MCNC: 4.1 MG/DL (ref 2.5–4.5)
PLATELET # BLD AUTO: 124 10E9/L (ref 150–450)
POTASSIUM SERPL-SCNC: 4.5 MMOL/L (ref 3.4–5.3)
PROT SERPL-MCNC: 6.4 G/DL (ref 6.8–8.8)
RBC # BLD AUTO: 2.55 10E12/L (ref 3.8–5.2)
RETICS # AUTO: ABNORMAL 10E9/L (ref 25–95)
RETICS/RBC NFR AUTO: 3.2 % (ref 0.5–2)
SODIUM SERPL-SCNC: 142 MMOL/L (ref 133–144)
TME LAST DOSE: NORMAL H
WBC # BLD AUTO: 4.1 10E9/L (ref 4–11)

## 2020-01-21 PROCEDURE — 36415 COLL VENOUS BLD VENIPUNCTURE: CPT | Performed by: TRANSPLANT SURGERY

## 2020-01-21 PROCEDURE — 85045 AUTOMATED RETICULOCYTE COUNT: CPT | Performed by: INTERNAL MEDICINE

## 2020-01-21 PROCEDURE — 84100 ASSAY OF PHOSPHORUS: CPT | Performed by: INTERNAL MEDICINE

## 2020-01-21 PROCEDURE — 80048 BASIC METABOLIC PNL TOTAL CA: CPT | Performed by: INTERNAL MEDICINE

## 2020-01-21 PROCEDURE — 83735 ASSAY OF MAGNESIUM: CPT | Performed by: INTERNAL MEDICINE

## 2020-01-21 PROCEDURE — 80076 HEPATIC FUNCTION PANEL: CPT | Performed by: INTERNAL MEDICINE

## 2020-01-21 PROCEDURE — 85060 BLOOD SMEAR INTERPRETATION: CPT | Performed by: INTERNAL MEDICINE

## 2020-01-21 PROCEDURE — 85025 COMPLETE CBC W/AUTO DIFF WBC: CPT | Performed by: INTERNAL MEDICINE

## 2020-01-21 PROCEDURE — 80158 DRUG ASSAY CYCLOSPORINE: CPT | Performed by: TRANSPLANT SURGERY

## 2020-01-22 ENCOUNTER — TELEPHONE (OUTPATIENT)
Dept: GASTROENTEROLOGY | Facility: CLINIC | Age: 60
End: 2020-01-22

## 2020-01-22 LAB — COPATH REPORT: NORMAL

## 2020-01-23 ENCOUNTER — OFFICE VISIT (OUTPATIENT)
Dept: GASTROENTEROLOGY | Facility: CLINIC | Age: 60
End: 2020-01-23
Attending: INTERNAL MEDICINE
Payer: COMMERCIAL

## 2020-01-23 ENCOUNTER — OFFICE VISIT (OUTPATIENT)
Dept: PHARMACY | Facility: CLINIC | Age: 60
End: 2020-01-23
Payer: COMMERCIAL

## 2020-01-23 VITALS
HEIGHT: 62 IN | WEIGHT: 178.5 LBS | BODY MASS INDEX: 32.85 KG/M2 | HEART RATE: 85 BPM | DIASTOLIC BLOOD PRESSURE: 79 MMHG | OXYGEN SATURATION: 96 % | TEMPERATURE: 98.1 F | SYSTOLIC BLOOD PRESSURE: 124 MMHG

## 2020-01-23 DIAGNOSIS — Z48.298 CARE AFTER ORGAN TRANSPLANT: ICD-10-CM

## 2020-01-23 DIAGNOSIS — M19.90 ARTHRITIS: ICD-10-CM

## 2020-01-23 DIAGNOSIS — K21.9 GASTROESOPHAGEAL REFLUX DISEASE WITHOUT ESOPHAGITIS: ICD-10-CM

## 2020-01-23 DIAGNOSIS — Z79.2 LONG-TERM CURRENT USE OF ANTIBIOTICS FOR PREVENTION OF RECURRENT INFECTION: ICD-10-CM

## 2020-01-23 DIAGNOSIS — D84.9 IMMUNOSUPPRESSED STATUS (H): Chronic | ICD-10-CM

## 2020-01-23 DIAGNOSIS — E66.09 OBESITY DUE TO EXCESS CALORIES, UNSPECIFIED CLASSIFICATION, UNSPECIFIED WHETHER SERIOUS COMORBIDITY PRESENT: ICD-10-CM

## 2020-01-23 DIAGNOSIS — Z94.4 STATUS POST LIVER TRANSPLANTATION (H): Primary | Chronic | ICD-10-CM

## 2020-01-23 DIAGNOSIS — Z94.4 STATUS POST LIVER TRANSPLANTATION (H): Primary | ICD-10-CM

## 2020-01-23 PROCEDURE — 99607 MTMS BY PHARM ADDL 15 MIN: CPT | Performed by: PHARMACIST

## 2020-01-23 PROCEDURE — 99605 MTMS BY PHARM NP 15 MIN: CPT | Performed by: PHARMACIST

## 2020-01-23 PROCEDURE — G0463 HOSPITAL OUTPT CLINIC VISIT: HCPCS | Mod: ZF

## 2020-01-23 ASSESSMENT — PAIN SCALES - GENERAL: PAINLEVEL: NO PAIN (0)

## 2020-01-23 ASSESSMENT — MIFFLIN-ST. JEOR: SCORE: 1337.92

## 2020-01-23 NOTE — PATIENT INSTRUCTIONS
Recommendations from today's MTM visit:                                                    1. Reduce your Famotidine 20mg to once daily, see how your heartburn is.     2. Take both Vitamin D tablets once daily in the morning.     3. You are due for the following vaccinations: Prevnar 13 and Shingrix (2 doses 8 weeks apart). If you like, you can wait until 6 months post transplant.     4. You can have primary care check Hepatitis B antibody titers to see if you are immune to it.     It was great to speak with you today.  I value your experience and would be very thankful for your time with providing feedback on our clinic survey. You may receive a survey via email or text message in the next few days.     Next MTM visit: as needed    To schedule another MTM appointment, please call the clinic directly or you may call the MTM scheduling line at [phone] or toll-free at [phone].     My Clinical Pharmacist's contact information:                                                      It was a pleasure talking with you today!  Please feel free to contact me with any questions or concerns you have.      Nawaf Browne, PharmD  MTM Pharmacist    Phone: [phone]

## 2020-01-24 NOTE — PROGRESS NOTES
Date of Service: 2020     Subjective:          Nicci Pemberton is a 59 year old female presenting for follow up with history of liver transplantation    History of Present Illness   Nicci Pemberton is a 59 year old female with past medical history of obesity, lymphedema, and cirrhosis secondary to combination of nonalcoholic fatty liver disease, iron overload, and alpha-1 antitrypsin MZ phenotype who is s/p  donor liver transplant on 19 and presents to re-establish care with hepatology.     Her post operative course was complicated by delerium, and on 2019 she was transitioned to cyclosporin from tacrolimus.  She had persistent nausea related to mycophenolate - which was present with all formulations. She was admitted to acute inpatient rehab on  thru 10/1/2019 for debility.  She was readmitted on 10/4 with acute onset vertigo with nausea and emesis - self limited    Her MMF was discontinued around month 3 post transplant with resolution of symptoms.   She is on Dapsone for PJP prophylaxis      Surgical Course  - OLT date: 2019   - etiology: ANGULO/alpha 1  - donor: type DBD  - Induction IS: basiliximab  - CMV status D+/R+  - operation: Surgical technique caval replacement, biliary anastomosis: duct to duct  - CiT 335min, WiT 35min  - Episodes of Rejection: none  - Biliary complications: none  - Other complications of immediate post-operative course: delerium with d/c of tacrolimus and start of cyclosporin    Past Medical History:  Past Medical History:   Diagnosis Date     Anemia      Cellulitis      Cirrhosis of liver (H) 2018     Heterozygous alpha 1-antitrypsin deficiency (H)      High hepatic iron concentration determined by biopsy of liver      Liver cirrhosis secondary to ANGULO (H)      Liver transplanted (H) 2019     Lymphedema      Osteoarthritis      SBP (spontaneous bacterial peritonitis) (H) 2019 in [place]       Past Surgical History:  Past Surgical History:  "  Procedure Laterality Date     BENCH LIVER N/A 2019    Procedure: BACKBENCH PREPARATION, LIVER;  Surgeon: Mandeep Alford MD;  Location: [facility]     COLONOSCOPY N/A 2018    Procedure: COLONOSCOPY;  colonoscopy;  Surgeon: Hugo Patel MD;  [facility]     ESOPHAGOSCOPY, GASTROSCOPY, DUODENOSCOPY (EGD), COMBINED N/A 10/31/2019    Procedure: Esophagogastroduodenoscopy, With Biopsy;  Surgeon: Nerissa Aranda MD;  Location: [facility] GI     IR FEEDING TUBE PLACEMENT W MOHAN/MD  2019     IR FLUORO 0-1 HOUR  2019     IR LUMBAR PUNCTURE  2019     TRANSPLANT LIVER RECIPIENT  DONOR N/A 2019    Procedure: TRANSPLANT, LIVER, RECIPIENT,  DONOR;  Surgeon: Mandeep Alford MD;  Location: [facility]       Past Social History:  Social History     Tobacco Use     Smoking status: Former Smoker     Packs/day: 0.00     Last attempt to quit:      Years since quittin.0     Smokeless tobacco: Never Used   Substance Use Topics     Alcohol use: No     Drug use: No        Family History:  Family History   Problem Relation Age of Onset     Cirrhosis No family hx of      Liver Cancer No family hx of        Review of Systems  A complete 10 point review of systems was asked and answered in the negative unless specifically commented upon in the HPI    Current Outpatient Medications   Medication     aspirin (ASA) 81 MG tablet     Cholecalciferol (VITAMIN D-3) 25 MCG (1000 UT) CAPS     cycloSPORINE modified (GENERIC EQUIVALENT) 100 MG capsule     cycloSPORINE modified (GENERIC EQUIVALENT) 25 MG capsule     dapsone (ACZONE) 100 MG tablet     famotidine (PEPCID) 20 MG tablet     levothyroxine (SYNTHROID/LEVOTHROID) 150 MCG tablet     No current facility-administered medications for this visit.        Objective:         Vitals:    20 1127   BP: 124/79   Pulse: 85   Temp: 98.1  F (36.7  C)   TempSrc: Oral   SpO2: 96%   Weight: 81 kg (178 lb 8 oz)   Height: 1.575 m (5' 2\") "     Body mass index is 32.65 kg/m .     Physical Exam    Vitals reviewed.   Constitutional: Well-developed, well-nourished, in no apparent distress.    HEENT: Normocephalic. no scleral icterus. Moist oral mucosa. Dentition normal  Neck/Lymph: Normal ROM, supple. No thyromegaly.  No lymphadenopathy  Cardiac:  Regular rate and rhythm.  No overt murmurs  Respiratory: Clear to auscultation bilaterally.  No wheezes or rales  GI:  Abdomen soft, obese,tender in right and left flank. BS present. Bilateral subcostal incision with vertical extension - well healed. No overt umbilical hernia  Skin:  Skin is warm and dry. No rash noted.  Peripheral Vascular: no lower extremity edema. 2+ pulses in all extremities  Musculoskeletal:  ROM intact, normal muscle bulk    Psychiatric: Normal mood and affect. Behavior is normal.  Neuro: no tremor, A&Ox3    Labs and Diagnostic tests:  CBC w/Diff    Lab Results   Component Value Date/Time    WBC 4.1 01/21/2020 09:39 AM    RBC 2.55 (L) 01/21/2020 09:39 AM    HGB 8.8 (L) 01/21/2020 09:39 AM    HCT 27.4 (L) 01/21/2020 09:39 AM     (H) 01/21/2020 09:39 AM    MCH 34.5 (H) 01/21/2020 09:39 AM    MCHC 32.1 01/21/2020 09:39 AM    RDW 12.3 01/21/2020 09:39 AM         Comprehensive Metabolic Profile    Lab Results   Component Value Date/Time     01/21/2020 09:39 AM    CO2 28 01/21/2020 09:39 AM    BUN 41 (H) 01/21/2020 09:39 AM    CR 1.03 01/21/2020 09:39 AM    Lab Results   Component Value Date/Time    ALBUMIN 3.4 01/21/2020 09:39 AM    ALKPHOS 145 01/21/2020 09:39 AM    AST 32 01/21/2020 09:39 AM    ALT 37 01/21/2020 09:39 AM          Assessment and Plan:    S/P Liver Transplantation:   - 8/18/2019 with DBD donor for ANGULO/A1AT/iron overload  - Bi-caval with duct to duct  - Excellent allograft function at this time  - Post- transplant labs per routine    Immunosuppression:   - Was transitioned to cyclosporin from tacrolimus for concerns of delerium  - Has been maintained with goal  trough ~250  - MMF discontinued with resolution of significant upper GI symptoms  - Per transplant immunosuppression protocol, we will plan to target a goal trough level of ~ 150 at this time  - Will plan to check immunosuppression trough levels per protocol to assess for adequate target dosing and will assess CBC and kidney function for toxicity of medications    Infectious Prophylaxis:   - She continues on dapsone for 6 months    Kidney Health:   - She has preserved kidney function at this time    Nutrition/Weight:    It is very common for patients to put on significant weight after liver transplantation, as they become conditioned to eating as much as possible to maintain a healthy weight pre-transplant.  There is a very significant risk of developing fatty liver and non-alcoholic steatohepatitis in post-transplant patients, even in those who were not transplanted for ANGULO cirrhosis.  - Please work on eating a diet that is rice in fresh fruits and vegetables, moderate amounts of lean protein and dairy, and modest amounts of carbohydrates and fats.  - An exercise plan/regimen is an essential part of remaining healthy in the post-transplant setting and we recommend 30-35 minutes of exercise at least 4 days a week    Routine Health Care:  - You need to establish and maintain care with a primary care physician to manage: hypertension, high cholesterol, abnormal blood sugars - as these are all potential complications of your health after liver transplantation and will need a local physician to manage these issues.  - All patients with liver diseaes (even post transplant) are at an increased risk for osteoporosis.  We strongly recommend screening for Vitamin D deficiency at least twice yearly with aggressive supplementation/replacement as indicated.    - We also recommend a screening DEXA scan to evaluate for osteoporosis.  If present, should treat with calcium, Vitamin D supplementation, and recommend consideration of  bisphosphonate therapy.  Also recommend follow up DEXA scans to evaluate for improvement of bone density on therapy.  - Recommend yearly skin exams with dermatology, and if skin cancers are found, recommend twice yearly exams  - Recommend you stay up to date for cancer screening: mammograms/PAP for women and prostate cancer screening for men, and colon cancer screening for all.  - Practice good hygiene with washing of hands and maintaining a clean living space as this will decrease your chances of developing an infection in the post-transplantation setting  - Eat a health diet: rich in fresh fruits and vegetables, lean proteins, and minimize carbohydrate and fat intake.       Thank you very much for the opportunity to participate in the care of this patient.  If you have any further questions, please don't hesitate to contact our office.    __________________________________  Thomas M. Leventhal, M.D.   of Medicine  Advanced & Transplant Hepatology  [facility]

## 2020-01-29 DIAGNOSIS — Z79.899 LONG TERM CURRENT USE OF IMMUNOSUPPRESSIVE DRUG: ICD-10-CM

## 2020-01-29 DIAGNOSIS — Z94.4 LIVER REPLACED BY TRANSPLANT (H): ICD-10-CM

## 2020-01-29 DIAGNOSIS — Z94.4 STATUS POST LIVER TRANSPLANTATION (H): Chronic | ICD-10-CM

## 2020-01-29 LAB
ALBUMIN SERPL-MCNC: 3.6 G/DL (ref 3.4–5)
ALBUMIN UR-MCNC: NEGATIVE MG/DL
ALP SERPL-CCNC: 182 U/L (ref 40–150)
ALT SERPL W P-5'-P-CCNC: 87 U/L (ref 0–50)
ANION GAP SERPL CALCULATED.3IONS-SCNC: 4 MMOL/L (ref 3–14)
APPEARANCE UR: CLEAR
AST SERPL W P-5'-P-CCNC: 49 U/L (ref 0–45)
BILIRUB DIRECT SERPL-MCNC: 0.5 MG/DL (ref 0–0.2)
BILIRUB SERPL-MCNC: 2 MG/DL (ref 0.2–1.3)
BILIRUB UR QL STRIP: NEGATIVE
BLOOD BANK CMNT PATIENT-IMP: NORMAL
BUN SERPL-MCNC: 40 MG/DL (ref 7–30)
CALCIUM SERPL-MCNC: 9.2 MG/DL (ref 8.5–10.1)
CHLORIDE SERPL-SCNC: 107 MMOL/L (ref 94–109)
CO2 SERPL-SCNC: 31 MMOL/L (ref 20–32)
COLOR UR AUTO: YELLOW
CREAT SERPL-MCNC: 1.15 MG/DL (ref 0.52–1.04)
CYCLOSPORINE BLD LC/MS/MS-MCNC: 228 UG/L (ref 50–400)
DAT POLY-SP REAG RBC QL: NORMAL
ERYTHROCYTE [DISTWIDTH] IN BLOOD BY AUTOMATED COUNT: 11.9 % (ref 10–15)
FERRITIN SERPL-MCNC: 357 NG/ML (ref 8–252)
GFR SERPL CREATININE-BSD FRML MDRD: 52 ML/MIN/{1.73_M2}
GLUCOSE SERPL-MCNC: 97 MG/DL (ref 70–99)
GLUCOSE UR STRIP-MCNC: NEGATIVE MG/DL
HCT VFR BLD AUTO: 28.9 % (ref 35–47)
HGB BLD-MCNC: 9.3 G/DL (ref 11.7–15.7)
HGB UR QL STRIP: ABNORMAL
IRON SATN MFR SERPL: 35 % (ref 15–46)
IRON SERPL-MCNC: 86 UG/DL (ref 35–180)
KETONES UR STRIP-MCNC: NEGATIVE MG/DL
LEUKOCYTE ESTERASE UR QL STRIP: NEGATIVE
MAGNESIUM SERPL-MCNC: 1.8 MG/DL (ref 1.6–2.3)
MCH RBC QN AUTO: 35 PG (ref 26.5–33)
MCHC RBC AUTO-ENTMCNC: 32.2 G/DL (ref 31.5–36.5)
MCV RBC AUTO: 109 FL (ref 78–100)
NITRATE UR QL: NEGATIVE
PH UR STRIP: 5.5 PH (ref 5–7)
PHOSPHATE SERPL-MCNC: 4.1 MG/DL (ref 2.5–4.5)
PLATELET # BLD AUTO: 145 10E9/L (ref 150–450)
POTASSIUM SERPL-SCNC: 4.5 MMOL/L (ref 3.4–5.3)
PROT SERPL-MCNC: 6.9 G/DL (ref 6.8–8.8)
RBC # BLD AUTO: 2.66 10E12/L (ref 3.8–5.2)
RBC #/AREA URNS AUTO: NORMAL /HPF
SODIUM SERPL-SCNC: 142 MMOL/L (ref 133–144)
SOURCE: ABNORMAL
SP GR UR STRIP: 1.01 (ref 1–1.03)
TIBC SERPL-MCNC: 249 UG/DL (ref 240–430)
TME LAST DOSE: NORMAL H
TSH SERPL DL<=0.005 MIU/L-ACNC: 0.81 MU/L (ref 0.4–4)
UROBILINOGEN UR STRIP-ACNC: 0.2 EU/DL (ref 0.2–1)
WBC # BLD AUTO: 3 10E9/L (ref 4–11)
WBC #/AREA URNS AUTO: NORMAL /HPF

## 2020-01-29 PROCEDURE — 83540 ASSAY OF IRON: CPT | Performed by: INTERNAL MEDICINE

## 2020-01-29 PROCEDURE — 81001 URINALYSIS AUTO W/SCOPE: CPT | Performed by: INTERNAL MEDICINE

## 2020-01-29 PROCEDURE — 86870 RBC ANTIBODY IDENTIFICATION: CPT | Performed by: INTERNAL MEDICINE

## 2020-01-29 PROCEDURE — 84100 ASSAY OF PHOSPHORUS: CPT | Performed by: TRANSPLANT SURGERY

## 2020-01-29 PROCEDURE — 80076 HEPATIC FUNCTION PANEL: CPT | Performed by: TRANSPLANT SURGERY

## 2020-01-29 PROCEDURE — 85027 COMPLETE CBC AUTOMATED: CPT | Performed by: TRANSPLANT SURGERY

## 2020-01-29 PROCEDURE — 83550 IRON BINDING TEST: CPT | Performed by: INTERNAL MEDICINE

## 2020-01-29 PROCEDURE — 80048 BASIC METABOLIC PNL TOTAL CA: CPT | Performed by: TRANSPLANT SURGERY

## 2020-01-29 PROCEDURE — 83010 ASSAY OF HAPTOGLOBIN QUANT: CPT | Performed by: INTERNAL MEDICINE

## 2020-01-29 PROCEDURE — 82728 ASSAY OF FERRITIN: CPT | Performed by: INTERNAL MEDICINE

## 2020-01-29 PROCEDURE — 84443 ASSAY THYROID STIM HORMONE: CPT | Performed by: INTERNAL MEDICINE

## 2020-01-29 PROCEDURE — 80158 DRUG ASSAY CYCLOSPORINE: CPT | Performed by: TRANSPLANT SURGERY

## 2020-01-29 PROCEDURE — 83735 ASSAY OF MAGNESIUM: CPT | Performed by: TRANSPLANT SURGERY

## 2020-01-29 PROCEDURE — 86880 COOMBS TEST DIRECT: CPT | Performed by: INTERNAL MEDICINE

## 2020-01-30 LAB
ABO + RH BLD: NORMAL
ABO + RH BLD: NORMAL
BLD GP AB INVEST PLASRBC-IMP: NORMAL
BLOOD BANK CMNT PATIENT-IMP: NORMAL
DAT POLY-SP REAG RBC QL: NORMAL

## 2020-01-31 ENCOUNTER — TELEPHONE (OUTPATIENT)
Dept: TRANSPLANT | Facility: CLINIC | Age: 60
End: 2020-01-31

## 2020-01-31 DIAGNOSIS — Z94.4 S/P LIVER TRANSPLANT (H): Primary | ICD-10-CM

## 2020-01-31 DIAGNOSIS — R79.89 ELEVATED LFTS: ICD-10-CM

## 2020-01-31 NOTE — TELEPHONE ENCOUNTER
Pt has increase in LFTs. Per Dr Leventhal patient to have ultrasond liver. Left detailed message for patient. Order placed and sent to .

## 2020-02-01 ENCOUNTER — ANCILLARY PROCEDURE (OUTPATIENT)
Dept: ULTRASOUND IMAGING | Facility: CLINIC | Age: 60
End: 2020-02-01
Attending: INTERNAL MEDICINE
Payer: COMMERCIAL

## 2020-02-01 DIAGNOSIS — R79.89 ELEVATED LFTS: ICD-10-CM

## 2020-02-01 DIAGNOSIS — Z94.4 S/P LIVER TRANSPLANT (H): ICD-10-CM

## 2020-02-01 LAB — RADIOLOGIST FLAGS: NORMAL

## 2020-02-03 ENCOUNTER — TELEPHONE (OUTPATIENT)
Dept: TRANSPLANT | Facility: CLINIC | Age: 60
End: 2020-02-03

## 2020-02-03 LAB — HAPTOGLOB SERPL-MCNC: <3 MG/DL (ref 32–197)

## 2020-02-03 NOTE — TELEPHONE ENCOUNTER
Per Dr Leventhal patient to have ERCP as soon as possible due to ultrasound results and increasing LFTs..     Spoke with Nicci regarding the plan and that the ERCP team would review and contact her.     Patient to call back with any further questions or concerns.

## 2020-02-04 ENCOUNTER — PATIENT OUTREACH (OUTPATIENT)
Dept: GASTROENTEROLOGY | Facility: CLINIC | Age: 60
End: 2020-02-04

## 2020-02-04 ENCOUNTER — PREP FOR PROCEDURE (OUTPATIENT)
Dept: GASTROENTEROLOGY | Facility: CLINIC | Age: 60
End: 2020-02-04

## 2020-02-04 DIAGNOSIS — Z94.4 S/P LIVER TRANSPLANT (H): Primary | ICD-10-CM

## 2020-02-04 NOTE — PROGRESS NOTES
Dr. Leventhal's office requesting ASAP ERCP r/t recent imaging.    Per Dr. Canada ok to schedule    Please assist in scheduling:     Procedure/Imaging/Clinic: ERCP  Physician: Dr. Canada  Timing: ASAP, prefers am if possible  Procedure length:90 minutes  Anesthesia:general  Dx: s/p liver transplant  [facility]      Called to discuss with patient. Explained they can expect a call for date and time for procedure, will need a , someone to stay with them for 24 hours and should stay in town for 24 hours (within 45 min of Hospital) post procedure    Patient needs to get pre-op physical completed and will fax a copy to us along with bringing a hard copy with them. Fax number given. [phone]    - Please see clinic note from Dr Leventhal on 1/23/2020 for HPI, ROS, etc.    *If you do not get a preop physical, your procedure could be cancelled, patient voiced understanding*    Blood thinner -  no  ASA - yes, ok to continue  Diabetic - no      A pre-op nurse will call 1-2 days prior to the procedure. Is advised to be NPO/no solid food 8 hours before the procedure. Ok to drink clear liquids (Water, Apple Juice or Gatorade) up to 2 hours prior to procedure.     Other specific details/comments:none     Verbalized understanding of all instructions. All questions answered.

## 2020-02-05 ENCOUNTER — TELEPHONE (OUTPATIENT)
Dept: GASTROENTEROLOGY | Facility: CLINIC | Age: 60
End: 2020-02-05

## 2020-02-05 PROBLEM — Z94.4 S/P LIVER TRANSPLANT (H): Status: ACTIVE | Noted: 2019-09-23

## 2020-02-05 NOTE — TELEPHONE ENCOUNTER
Spoke to patient in regards to scheduled procedure. Informed patient she is scheduled with Dr. Hope on 2/10/2020. Informed patient she will need a  and someone to monitor her for 24 hours after the procedure. Informed patient all scheduling details will be sent to her MyChart per her request.    HK 2/5/2020

## 2020-02-07 ENCOUNTER — ANESTHESIA EVENT (OUTPATIENT)
Dept: SURGERY | Facility: CLINIC | Age: 60
End: 2020-02-07
Payer: COMMERCIAL

## 2020-02-07 DIAGNOSIS — Z94.4 STATUS POST LIVER TRANSPLANTATION (H): ICD-10-CM

## 2020-02-07 RX ORDER — DAPSONE 100 MG/1
100 TABLET ORAL DAILY
Qty: 90 TABLET | Refills: 1 | OUTPATIENT
Start: 2020-02-07

## 2020-02-10 ENCOUNTER — HOSPITAL ENCOUNTER (OUTPATIENT)
Facility: CLINIC | Age: 60
Discharge: HOME | End: 2020-02-10
Attending: INTERNAL MEDICINE | Admitting: INTERNAL MEDICINE
Payer: COMMERCIAL

## 2020-02-10 ENCOUNTER — ANESTHESIA (OUTPATIENT)
Dept: SURGERY | Facility: CLINIC | Age: 60
End: 2020-02-10
Payer: COMMERCIAL

## 2020-02-10 ENCOUNTER — APPOINTMENT (OUTPATIENT)
Dept: GENERAL RADIOLOGY | Facility: CLINIC | Age: 60
End: 2020-02-10
Attending: INTERNAL MEDICINE
Payer: COMMERCIAL

## 2020-02-10 VITALS
SYSTOLIC BLOOD PRESSURE: 141 MMHG | BODY MASS INDEX: 30.59 KG/M2 | TEMPERATURE: 97.9 F | HEART RATE: 53 BPM | WEIGHT: 172.62 LBS | HEIGHT: 63 IN | RESPIRATION RATE: 16 BRPM | OXYGEN SATURATION: 94 % | DIASTOLIC BLOOD PRESSURE: 84 MMHG

## 2020-02-10 DIAGNOSIS — Z94.4 S/P LIVER TRANSPLANT (H): ICD-10-CM

## 2020-02-10 LAB
ABO + RH BLD: NORMAL
ABO + RH BLD: NORMAL
ALBUMIN SERPL-MCNC: 3.7 G/DL (ref 3.4–5)
ALP SERPL-CCNC: 410 U/L (ref 40–150)
ALT SERPL W P-5'-P-CCNC: 439 U/L (ref 0–50)
AMYLASE SERPL-CCNC: 29 U/L (ref 30–110)
AMYLASE SERPL-CCNC: 96 U/L (ref 30–110)
ANION GAP SERPL CALCULATED.3IONS-SCNC: 5 MMOL/L (ref 3–14)
AST SERPL W P-5'-P-CCNC: 321 U/L (ref 0–45)
BILIRUB SERPL-MCNC: 3 MG/DL (ref 0.2–1.3)
BLD GP AB SCN SERPL QL: NORMAL
BLOOD BANK CMNT PATIENT-IMP: NORMAL
BUN SERPL-MCNC: 37 MG/DL (ref 7–30)
CALCIUM SERPL-MCNC: 9.1 MG/DL (ref 8.5–10.1)
CHLORIDE SERPL-SCNC: 109 MMOL/L (ref 94–109)
CO2 SERPL-SCNC: 27 MMOL/L (ref 20–32)
CREAT SERPL-MCNC: 1 MG/DL (ref 0.52–1.04)
ERCP: NORMAL
ERYTHROCYTE [DISTWIDTH] IN BLOOD BY AUTOMATED COUNT: 11.7 % (ref 10–15)
GFR SERPL CREATININE-BSD FRML MDRD: 61 ML/MIN/{1.73_M2}
GLUCOSE BLDC GLUCOMTR-MCNC: 82 MG/DL (ref 70–99)
GLUCOSE BLDC GLUCOMTR-MCNC: 88 MG/DL (ref 70–99)
GLUCOSE SERPL-MCNC: 76 MG/DL (ref 70–99)
HCT VFR BLD AUTO: 30.4 % (ref 35–47)
HGB BLD-MCNC: 9.7 G/DL (ref 11.7–15.7)
LIPASE SERPL-CCNC: 1481 U/L (ref 73–393)
LIPASE SERPL-CCNC: 29 U/L (ref 73–393)
MCH RBC QN AUTO: 33.8 PG (ref 26.5–33)
MCHC RBC AUTO-ENTMCNC: 31.9 G/DL (ref 31.5–36.5)
MCV RBC AUTO: 106 FL (ref 78–100)
PLATELET # BLD AUTO: 109 10E9/L (ref 150–450)
POTASSIUM SERPL-SCNC: 4.2 MMOL/L (ref 3.4–5.3)
PROT SERPL-MCNC: 6.8 G/DL (ref 6.8–8.8)
RBC # BLD AUTO: 2.87 10E12/L (ref 3.8–5.2)
SODIUM SERPL-SCNC: 140 MMOL/L (ref 133–144)
SPECIMEN EXP DATE BLD: NORMAL
WBC # BLD AUTO: 3.5 10E9/L (ref 4–11)

## 2020-02-10 PROCEDURE — 40000170 ZZH STATISTIC PRE-PROCEDURE ASSESSMENT II: Performed by: INTERNAL MEDICINE

## 2020-02-10 PROCEDURE — 85027 COMPLETE CBC AUTOMATED: CPT | Performed by: INTERNAL MEDICINE

## 2020-02-10 PROCEDURE — 27210794 ZZH OR GENERAL SUPPLY STERILE: Performed by: INTERNAL MEDICINE

## 2020-02-10 PROCEDURE — 83690 ASSAY OF LIPASE: CPT | Performed by: INTERNAL MEDICINE

## 2020-02-10 PROCEDURE — 71000012 ZZH RECOVERY PHASE 1 LEVEL 1 FIRST HR: Performed by: INTERNAL MEDICINE

## 2020-02-10 PROCEDURE — 86901 BLOOD TYPING SEROLOGIC RH(D): CPT | Performed by: INTERNAL MEDICINE

## 2020-02-10 PROCEDURE — 82962 GLUCOSE BLOOD TEST: CPT

## 2020-02-10 PROCEDURE — 71000027 ZZH RECOVERY PHASE 2 EACH 15 MINS: Performed by: INTERNAL MEDICINE

## 2020-02-10 PROCEDURE — 25000566 ZZH SEVOFLURANE, EA 15 MIN: Performed by: INTERNAL MEDICINE

## 2020-02-10 PROCEDURE — 80053 COMPREHEN METABOLIC PANEL: CPT | Performed by: INTERNAL MEDICINE

## 2020-02-10 PROCEDURE — 25800030 ZZH RX IP 258 OP 636: Performed by: STUDENT IN AN ORGANIZED HEALTH CARE EDUCATION/TRAINING PROGRAM

## 2020-02-10 PROCEDURE — 25500064 ZZH RX 255 OP 636: Performed by: INTERNAL MEDICINE

## 2020-02-10 PROCEDURE — 86900 BLOOD TYPING SEROLOGIC ABO: CPT | Performed by: INTERNAL MEDICINE

## 2020-02-10 PROCEDURE — 40000279 XR SURGERY CARM FLUORO GREATER THAN 5 MIN W STILLS: Mod: TC

## 2020-02-10 PROCEDURE — 86850 RBC ANTIBODY SCREEN: CPT | Performed by: INTERNAL MEDICINE

## 2020-02-10 PROCEDURE — C1876 STENT, NON-COA/NON-COV W/DEL: HCPCS | Performed by: INTERNAL MEDICINE

## 2020-02-10 PROCEDURE — 82150 ASSAY OF AMYLASE: CPT | Performed by: INTERNAL MEDICINE

## 2020-02-10 PROCEDURE — C1726 CATH, BAL DIL, NON-VASCULAR: HCPCS | Performed by: INTERNAL MEDICINE

## 2020-02-10 PROCEDURE — 25000125 ZZHC RX 250: Performed by: STUDENT IN AN ORGANIZED HEALTH CARE EDUCATION/TRAINING PROGRAM

## 2020-02-10 PROCEDURE — 37000009 ZZH ANESTHESIA TECHNICAL FEE, EACH ADDTL 15 MIN: Performed by: INTERNAL MEDICINE

## 2020-02-10 PROCEDURE — 36415 COLL VENOUS BLD VENIPUNCTURE: CPT | Performed by: INTERNAL MEDICINE

## 2020-02-10 PROCEDURE — 25000125 ZZHC RX 250: Performed by: INTERNAL MEDICINE

## 2020-02-10 PROCEDURE — 36000059 ZZH SURGERY LEVEL 3 EA 15 ADDTL MIN UMMC: Performed by: INTERNAL MEDICINE

## 2020-02-10 PROCEDURE — 25000128 H RX IP 250 OP 636: Performed by: STUDENT IN AN ORGANIZED HEALTH CARE EDUCATION/TRAINING PROGRAM

## 2020-02-10 PROCEDURE — C1769 GUIDE WIRE: HCPCS | Performed by: INTERNAL MEDICINE

## 2020-02-10 PROCEDURE — 36000061 ZZH SURGERY LEVEL 3 W FLUORO 1ST 30 MIN - UMMC: Performed by: INTERNAL MEDICINE

## 2020-02-10 PROCEDURE — 37000008 ZZH ANESTHESIA TECHNICAL FEE, 1ST 30 MIN: Performed by: INTERNAL MEDICINE

## 2020-02-10 DEVICE — STENT JOHLIN PANCREA WEDGE 10FRX20CM W/INTRO G26827
Type: IMPLANTABLE DEVICE | Site: PANCREATIC DUCT | Status: NON-FUNCTIONAL
Removed: 2020-03-04

## 2020-02-10 RX ORDER — FENTANYL CITRATE 50 UG/ML
INJECTION, SOLUTION INTRAMUSCULAR; INTRAVENOUS PRN
Status: DISCONTINUED | OUTPATIENT
Start: 2020-02-10 | End: 2020-02-11

## 2020-02-10 RX ORDER — INDOMETHACIN 50 MG/1
100 SUPPOSITORY RECTAL
Status: DISCONTINUED | OUTPATIENT
Start: 2020-02-10 | End: 2020-02-10 | Stop reason: HOSPADM

## 2020-02-10 RX ORDER — MEPERIDINE HYDROCHLORIDE 25 MG/ML
12.5 INJECTION INTRAMUSCULAR; INTRAVENOUS; SUBCUTANEOUS
Status: DISCONTINUED | OUTPATIENT
Start: 2020-02-10 | End: 2020-02-10 | Stop reason: HOSPADM

## 2020-02-10 RX ORDER — IOPAMIDOL 510 MG/ML
INJECTION, SOLUTION INTRAVASCULAR PRN
Status: DISCONTINUED | OUTPATIENT
Start: 2020-02-10 | End: 2020-02-10 | Stop reason: HOSPADM

## 2020-02-10 RX ORDER — DEXAMETHASONE SODIUM PHOSPHATE 4 MG/ML
INJECTION, SOLUTION INTRA-ARTICULAR; INTRALESIONAL; INTRAMUSCULAR; INTRAVENOUS; SOFT TISSUE PRN
Status: DISCONTINUED | OUTPATIENT
Start: 2020-02-10 | End: 2020-02-11

## 2020-02-10 RX ORDER — NALOXONE HYDROCHLORIDE 0.4 MG/ML
.1-.4 INJECTION, SOLUTION INTRAMUSCULAR; INTRAVENOUS; SUBCUTANEOUS
Status: DISCONTINUED | OUTPATIENT
Start: 2020-02-10 | End: 2020-02-10 | Stop reason: HOSPADM

## 2020-02-10 RX ORDER — LEVOFLOXACIN 5 MG/ML
INJECTION, SOLUTION INTRAVENOUS PRN
Status: DISCONTINUED | OUTPATIENT
Start: 2020-02-10 | End: 2020-02-11

## 2020-02-10 RX ORDER — LIDOCAINE HYDROCHLORIDE 20 MG/ML
INJECTION, SOLUTION INFILTRATION; PERINEURAL PRN
Status: DISCONTINUED | OUTPATIENT
Start: 2020-02-10 | End: 2020-02-11

## 2020-02-10 RX ORDER — NALOXONE HYDROCHLORIDE 0.4 MG/ML
.1-.4 INJECTION, SOLUTION INTRAMUSCULAR; INTRAVENOUS; SUBCUTANEOUS
Status: CANCELLED | OUTPATIENT
Start: 2020-02-10 | End: 2020-02-11

## 2020-02-10 RX ORDER — SODIUM CHLORIDE, SODIUM LACTATE, POTASSIUM CHLORIDE, CALCIUM CHLORIDE 600; 310; 30; 20 MG/100ML; MG/100ML; MG/100ML; MG/100ML
INJECTION, SOLUTION INTRAVENOUS CONTINUOUS PRN
Status: DISCONTINUED | OUTPATIENT
Start: 2020-02-10 | End: 2020-02-11

## 2020-02-10 RX ORDER — FLUMAZENIL 0.1 MG/ML
0.2 INJECTION, SOLUTION INTRAVENOUS
Status: CANCELLED | OUTPATIENT
Start: 2020-02-10 | End: 2020-02-11

## 2020-02-10 RX ORDER — PROPOFOL 10 MG/ML
INJECTION, EMULSION INTRAVENOUS PRN
Status: DISCONTINUED | OUTPATIENT
Start: 2020-02-10 | End: 2020-02-11

## 2020-02-10 RX ORDER — LIDOCAINE 40 MG/G
CREAM TOPICAL
Status: DISCONTINUED | OUTPATIENT
Start: 2020-02-10 | End: 2020-02-10 | Stop reason: HOSPADM

## 2020-02-10 RX ORDER — SODIUM CHLORIDE, SODIUM LACTATE, POTASSIUM CHLORIDE, CALCIUM CHLORIDE 600; 310; 30; 20 MG/100ML; MG/100ML; MG/100ML; MG/100ML
INJECTION, SOLUTION INTRAVENOUS CONTINUOUS
Status: DISCONTINUED | OUTPATIENT
Start: 2020-02-10 | End: 2020-02-10 | Stop reason: HOSPADM

## 2020-02-10 RX ORDER — FENTANYL CITRATE 50 UG/ML
25-50 INJECTION, SOLUTION INTRAMUSCULAR; INTRAVENOUS EVERY 5 MIN PRN
Status: DISCONTINUED | OUTPATIENT
Start: 2020-02-10 | End: 2020-02-10 | Stop reason: HOSPADM

## 2020-02-10 RX ORDER — ONDANSETRON 4 MG/1
4 TABLET, ORALLY DISINTEGRATING ORAL EVERY 30 MIN PRN
Status: DISCONTINUED | OUTPATIENT
Start: 2020-02-10 | End: 2020-02-10 | Stop reason: HOSPADM

## 2020-02-10 RX ORDER — HYDROMORPHONE HYDROCHLORIDE 1 MG/ML
.3-.5 INJECTION, SOLUTION INTRAMUSCULAR; INTRAVENOUS; SUBCUTANEOUS EVERY 10 MIN PRN
Status: DISCONTINUED | OUTPATIENT
Start: 2020-02-10 | End: 2020-02-10 | Stop reason: HOSPADM

## 2020-02-10 RX ORDER — ONDANSETRON 2 MG/ML
4 INJECTION INTRAMUSCULAR; INTRAVENOUS EVERY 30 MIN PRN
Status: DISCONTINUED | OUTPATIENT
Start: 2020-02-10 | End: 2020-02-10 | Stop reason: HOSPADM

## 2020-02-10 RX ADMIN — LEVOFLOXACIN 500 MG: 5 INJECTION, SOLUTION INTRAVENOUS at 14:26

## 2020-02-10 RX ADMIN — PHENYLEPHRINE HYDROCHLORIDE 100 MCG: 10 INJECTION INTRAVENOUS at 14:58

## 2020-02-10 RX ADMIN — SODIUM CHLORIDE, POTASSIUM CHLORIDE, SODIUM LACTATE AND CALCIUM CHLORIDE: 600; 310; 30; 20 INJECTION, SOLUTION INTRAVENOUS at 13:54

## 2020-02-10 RX ADMIN — PHENYLEPHRINE HYDROCHLORIDE 100 MCG: 10 INJECTION INTRAVENOUS at 15:12

## 2020-02-10 RX ADMIN — PROPOFOL 150 MG: 10 INJECTION, EMULSION INTRAVENOUS at 14:00

## 2020-02-10 RX ADMIN — ROCURONIUM BROMIDE 40 MG: 10 INJECTION INTRAVENOUS at 14:00

## 2020-02-10 RX ADMIN — FENTANYL CITRATE 50 MCG: 50 INJECTION, SOLUTION INTRAMUSCULAR; INTRAVENOUS at 14:34

## 2020-02-10 RX ADMIN — LIDOCAINE HYDROCHLORIDE 80 MG: 20 INJECTION, SOLUTION INFILTRATION; PERINEURAL at 14:00

## 2020-02-10 RX ADMIN — DEXAMETHASONE SODIUM PHOSPHATE 4 MG: 4 INJECTION, SOLUTION INTRA-ARTICULAR; INTRALESIONAL; INTRAMUSCULAR; INTRAVENOUS; SOFT TISSUE at 14:33

## 2020-02-10 RX ADMIN — FENTANYL CITRATE 50 MCG: 50 INJECTION, SOLUTION INTRAMUSCULAR; INTRAVENOUS at 14:00

## 2020-02-10 RX ADMIN — MIDAZOLAM 1 MG: 1 INJECTION INTRAMUSCULAR; INTRAVENOUS at 13:54

## 2020-02-10 RX ADMIN — ROCURONIUM BROMIDE 10 MG: 10 INJECTION INTRAVENOUS at 14:48

## 2020-02-10 ASSESSMENT — MIFFLIN-ST. JEOR: SCORE: 1327.13

## 2020-02-10 NOTE — DISCHARGE INSTRUCTIONS
From Dr. Canada:                         - Avoid aspirin, anti-coagulation and nonsteroidal                        anti-inflammatory medicines for 3 days.                        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 If stricture is                        persistent, will consider placement of intraductal                        fully-covered Wallflex stent                        - If patient develops fever, chills, jaundice or passes                        dark urine or has worsening of LFTs before scheduled                        ERCP, will recommend patient to call us immediately for                        consideration of an earlier urgent ERCP     ***Start with clear liquids tonight and then progress to soft solids.    [facility], [place]  Same-Day Surgery   Adult Discharge Orders & Instructions     For 24 hours after surgery    1. Get plenty of rest.  A responsible adult must stay with you for at least 24 hours after you leave the hospital.   2. Do not drive or use heavy equipment.  If you have weakness or tingling, don't drive or use heavy equipment until this feeling goes away.  3. Do not drink alcohol.  4. Avoid strenuous or risky activities.  Ask for help when climbing stairs.   5. You may feel lightheaded.  IF so, sit for a few minutes before standing.  Have someone help you get up.   6. If you have nausea (feel sick to your stomach): Drink only clear liquids such as apple juice, ginger ale, broth or 7-Up.  Rest may also help.  Be sure to drink enough fluids.  Move to a regular diet as you feel able.  7. You may have a slight fever. Call the doctor if your fever is over 100 F (37.7 C) (taken under the tongue) or lasts longer than 24 hours.  8. You may have a dry mouth, a sore throat, muscle aches or trouble sleeping.  These should go away after 24 hours.  9. Do not make important or legal  decisions.   Call your doctor for any of the followin.  Signs of infection (fever, growing tenderness at the surgery site, a large amount of drainage or bleeding, severe pain, foul-smelling drainage, redness, swelling).    2. It has been over 8 to 10 hours since surgery and you are still not able to urinate (pass water).    3.  Headache for over 24 hours.    4.  Numbness, tingling or weakness the day after surgery (if you had spinal anesthesia).  To contact a doctor, call Dr. Guru Canada @ [phone] or:        [phone] and ask for the resident on call for Gastroenterology  (answered 24 hours a day)      Emergency Department:    [facility]: [phone]       (TTY for hearing impaired: [phone])

## 2020-02-10 NOTE — OR NURSING
Dr. Canada paged for brief op note.  Dr. Moreno saw pt at bedside and verbalized plan to place signout.

## 2020-02-10 NOTE — OR NURSING
Lab draw completed. Patient reports no pain. Vitals wnl. Pt  in room. Will continue to monitor.

## 2020-02-10 NOTE — ANESTHESIA POSTPROCEDURE EVALUATION
Anesthesia POST Procedure Evaluation    Patient: Nicci Pemberton   MRN:     [id number] Gender:   female   Age:    59 year old :      1960        Preoperative Diagnosis: S/P liver transplant (H) [Z94.4]   Procedure(s):  ENDOSCOPIC RETROGRADE CHOLANGIOPANCREATOGRAPHY with spinchterotomy   Postop Comments: No value filed.       Anesthesia Type:  Not documented  General    Reportable Event: NO     PAIN: Uncomplicated   Sign Out status: Comfortable, Well controlled pain     PONV: No PONV   Sign Out status:  No Nausea or Vomiting     Neuro/Psych: Uneventful perioperative course   Sign Out Status: Preoperative baseline; Age appropriate mentation     Airway/Resp.: Uneventful perioperative course   Sign Out Status: Non labored breathing, age appropriate RR; Resp. Status within EXPECTED Parameters     CV: Uneventful perioperative course   Sign Out status: Appropriate BP and perfusion indices; Appropriate HR/Rhythm     Disposition:   Sign Out in:  PACU  Disposition:  Phase II; Home  Recovery Course: Uneventful  Follow-Up: Not required     Comments/Narrative:  Noted small ecchymosis on the medial side of the eyelid after turning patient supine.  No visual defects as per patient.    Tolerated procedure well.  VSS           Last Anesthesia Record Vitals:  CRNA VITALS  2/10/2020 1506 - 2/10/2020 1606      2/10/2020             Resp Rate (observed):  (!) 2          Last PACU Vitals:  Vitals Value Taken Time   /81 2/10/2020  4:10 PM   Temp 36.7  C (98.1  F) 2/10/2020  4:00 PM   Pulse 54 2/10/2020  4:10 PM   Resp 17 2/10/2020  4:00 PM   SpO2 97 % 2/10/2020  4:11 PM   Temp src     NIBP     Pulse     SpO2     Resp     Temp     Ht Rate     Temp 2     Vitals shown include unvalidated device data.      Electronically Signed By: Sulma Moreno MD, February 10, 2020, 4:12 PM

## 2020-02-11 ENCOUNTER — TELEPHONE (OUTPATIENT)
Dept: TRANSPLANT | Facility: CLINIC | Age: 60
End: 2020-02-11

## 2020-02-11 NOTE — TELEPHONE ENCOUNTER
Spoke with Nicci. She reports that she ended up with a black left  eye after her ERCP.  Per anesthesia postprocedure note Noted small ecchymosis on the medial side of the eyelid after turning patient supine. Discussed with Jacy, supervisor, compass filled out.

## 2020-02-11 NOTE — PROGRESS NOTES
"SUBJECTIVE/OBJECTIVE:                Nicci Pemberton is a 59 year old female coming in for a transitions of care visit.  She was discharged from [facility] on 10/6 for Liver txp.     Chief Complaint: Initial post txp med review.    Allergies/ADRs: Reviewed in Epic  Tobacco: No tobacco use   Alcohol: not currently using  Caffeine: no caffeine  PMH: Reviewed in Epic    Medication Adherence/Access:  Patient uses pill box(es) and has assistance with medication administration: family member, Hugo.   Patient takes medications 2 time(s) per day.   Per patient, misses medication 0 times per week.   Medication barriers: none.   The patient fills medications at [place]: YES.    Liver Transplant:  Current immunosuppressants include CSA 150mg qAM and 175mg qPM.  Pt reports stomach issues resolved since stopping Myfortic.   Aspirin 81mg daily, denies sx of bleeds, but does bruise easily.  Transplant date: 8/18/19  Estimated Creatinine Clearance: 58 mL/min (based on SCr of 1.03 mg/dL).  CMV prophylaxis: Completed  PCP prophylaxis: Dapsone 100mg daily for 6 months post txp  Supplements: Pt is taking Vitamin D 1000 units BID.   Tx Coordinator: Zina, Using Med Card: Yes,  Immunizations: annual flu shot 2019; Ivowcrqrjh20:  2005, 2011; Prevnar 13: unknown; TDaP:  2018; Shingrix: Zostvax 2016, unknown for shingrix.   BP Readings from Last 3 Encounters:   01/23/20 124/79   01/08/20 119/78   11/22/19 96/68     Arthritis: Pt is using APAP 650-1000mg couple times a week. She gets the \"Rooster gel\" injections. Due for the next on on Tuesday.  Mostly just toughs through the pain.   Lab Results   Component Value Date    A1C 5.0 08/18/2019     GERD: Current medications include: Pepcid (famotidine) 20mg BID. Pt c/o no current symptoms, last symptoms when on Myfortic. .  Patient feels that current regimen is effective.    Today's Vitals: There were no vitals taken for this visit.    ASSESSMENT:                 Current medications were reviewed " Chief Complaint   Patient presents with    Cold Symptoms     Cough congestion fever and chills that started on saturday \" Room 9    Headache today.      Medication Adherence: good, no issues identified. May combine Vitamin D tablets, does not have to be BID dosing.     Liver Transplant:  At 6 months patient is due for the following vaccinations: Prevnar 13, Shingrix. Pt concerned about Hep B vaccine effectiveness from last year, PCP may draw antibody titers to assure it was effective.     Arthritis: Pt may take up to 2000 mg of APAP daily, recommend max dose of 650mg TID prn.  We also discussed that Glucosamine/ Chondroitin would be a safe OTC med to try as well.     GERD: Famotidine likely unnecessary, may take 1 tablet daily and watch for rebound heartburn. If not, can reduce to prn use only.     PLAN:                Pt to...  1. Reduce famotidine to 20mg once daily, may trial off if no rebound GERD sx.  2. Take both Vitamin D tablets simultaneously.   3. At 6 months post txp consider: Prevnar 13 and Shingrix (2x doses), if concerned about HBV vaccine response, have PCP draw HBV antibody titers.   4. May use APAP 650mg up to a max of TID, consider Glucosamine Chondroitin as well for joint pain.       I spent 30 minutes with this patient today. I offer these suggestions for consideration by txp team. A copy of the visit note was provided to the patient's referring provider.    Will follow up as needed.    The patient was given a summary of these recommendations as an after visit summary.    Nawaf Browne, PharmD  [facility] Pharmacist    Phone: [phone]

## 2020-02-11 NOTE — TELEPHONE ENCOUNTER
Patient Call: General  Route to LPN    Reason for call: Pt has updates and questions to review with Coordinator     Call back needed? Yes    Return Call Needed  Same as documented in contacts section  When to return call?: Greater than one day: Route standard priority

## 2020-02-11 NOTE — ANESTHESIA CARE TRANSFER NOTE
Patient: Nicci Pemberton    Procedure(s):  ENDOSCOPIC RETROGRADE CHOLANGIOPANCREATOGRAPHY with spinchterotomy    Diagnosis: S/P liver transplant (H) [Z94.4]  Diagnosis Additional Information: No value filed.    Anesthesia Type:   General     Note:  Airway :Room Air  Patient transferred to:PACU  Comments: VSS. Breathing spontaneously at a regular rate with adequate tidal volumes and maintaining O2 sats on RA. Denies nausea or pain. No apparent complications from anesthesia.     Edel Pace MD  CA-1      Handoff Report: Identifed the Patient, Identified the Reponsible Provider, Reviewed the pertinent medical history, Discussed the surgical course, Reviewed Intra-OP anesthesia mangement and issues during anesthesia, Set expectations for post-procedure period and Allowed opportunity for questions and acknowledgement of understanding      Vitals: (Last set prior to Anesthesia Care Transfer)    CRNA VITALS  2/10/2020 1506 - 2/10/2020 1606      2/10/2020             Resp Rate (observed):  (!) 2                Electronically Signed By: Edel Pace MD  February 11, 2020  1:15 PM

## 2020-02-12 ENCOUNTER — PREP FOR PROCEDURE (OUTPATIENT)
Dept: GASTROENTEROLOGY | Facility: CLINIC | Age: 60
End: 2020-02-12

## 2020-02-12 ENCOUNTER — HOSPITAL ENCOUNTER (OUTPATIENT)
Facility: CLINIC | Age: 60
End: 2020-02-12
Attending: INTERNAL MEDICINE | Admitting: INTERNAL MEDICINE
Payer: COMMERCIAL

## 2020-02-12 ENCOUNTER — TELEPHONE (OUTPATIENT)
Dept: GASTROENTEROLOGY | Facility: CLINIC | Age: 60
End: 2020-02-12

## 2020-02-12 DIAGNOSIS — K83.1 BILE DUCT STRICTURE (H): Primary | ICD-10-CM

## 2020-02-12 DIAGNOSIS — K83.1 BILE DUCT STRICTURE (H): ICD-10-CM

## 2020-02-12 NOTE — TELEPHONE ENCOUNTER
----- Message from Elayne Coronel sent at 2/12/2020  3:21 PM CST -----  Hi Ladies,    This patient left me a VM wanting to schedule her next procedure in 4 weeks. Can one of you place the case request and I will give her a call when they're signed?    Thank you,  Elayne

## 2020-02-12 NOTE — TELEPHONE ENCOUNTER
Case request placed.     TODD [address] Dr. Stubbs, & Dr. Canada  Advanced Endoscopy  [phone]

## 2020-02-14 ENCOUNTER — TELEPHONE (OUTPATIENT)
Dept: GASTROENTEROLOGY | Facility: CLINIC | Age: 60
End: 2020-02-14

## 2020-02-14 NOTE — TELEPHONE ENCOUNTER
Spoke to patient in regards to scheduled procedure. Informed patient she is scheduled with Dr. Hope  on 3/4/2020 per her schedule. Informed patient she will need a  and someone to monitor her for 24 hours after the procedure. Informed patient all scheduling details will be sent to her MyChart per her request.    HK 2/14/2020

## 2020-02-17 ENCOUNTER — TELEPHONE (OUTPATIENT)
Dept: TRANSPLANT | Facility: CLINIC | Age: 60
End: 2020-02-17

## 2020-02-17 DIAGNOSIS — Z94.4 LIVER REPLACED BY TRANSPLANT (H): ICD-10-CM

## 2020-02-17 DIAGNOSIS — Z79.899 LONG TERM CURRENT USE OF IMMUNOSUPPRESSIVE DRUG: ICD-10-CM

## 2020-02-17 LAB
ALBUMIN SERPL-MCNC: 3.8 G/DL (ref 3.4–5)
ALP SERPL-CCNC: 216 U/L (ref 40–150)
ALT SERPL W P-5'-P-CCNC: 67 U/L (ref 0–50)
ANION GAP SERPL CALCULATED.3IONS-SCNC: 2 MMOL/L (ref 3–14)
AST SERPL W P-5'-P-CCNC: 21 U/L (ref 0–45)
BILIRUB DIRECT SERPL-MCNC: 0.5 MG/DL (ref 0–0.2)
BILIRUB SERPL-MCNC: 2.2 MG/DL (ref 0.2–1.3)
BUN SERPL-MCNC: 38 MG/DL (ref 7–30)
CALCIUM SERPL-MCNC: 9.7 MG/DL (ref 8.5–10.1)
CHLORIDE SERPL-SCNC: 108 MMOL/L (ref 94–109)
CO2 SERPL-SCNC: 30 MMOL/L (ref 20–32)
CREAT SERPL-MCNC: 1.36 MG/DL (ref 0.52–1.04)
CYCLOSPORINE BLD LC/MS/MS-MCNC: 215 UG/L (ref 50–400)
ERYTHROCYTE [DISTWIDTH] IN BLOOD BY AUTOMATED COUNT: 11.1 % (ref 10–15)
GFR SERPL CREATININE-BSD FRML MDRD: 42 ML/MIN/{1.73_M2}
GLUCOSE SERPL-MCNC: 75 MG/DL (ref 70–99)
HCT VFR BLD AUTO: 30.7 % (ref 35–47)
HGB BLD-MCNC: 9.9 G/DL (ref 11.7–15.7)
MAGNESIUM SERPL-MCNC: 1.9 MG/DL (ref 1.6–2.3)
MCH RBC QN AUTO: 34.3 PG (ref 26.5–33)
MCHC RBC AUTO-ENTMCNC: 32.2 G/DL (ref 31.5–36.5)
MCV RBC AUTO: 106 FL (ref 78–100)
PHOSPHATE SERPL-MCNC: 3.7 MG/DL (ref 2.5–4.5)
PLATELET # BLD AUTO: 134 10E9/L (ref 150–450)
POTASSIUM SERPL-SCNC: 4.8 MMOL/L (ref 3.4–5.3)
PROT SERPL-MCNC: 7 G/DL (ref 6.8–8.8)
RBC # BLD AUTO: 2.89 10E12/L (ref 3.8–5.2)
SODIUM SERPL-SCNC: 140 MMOL/L (ref 133–144)
TME LAST DOSE: NORMAL H
WBC # BLD AUTO: 4.5 10E9/L (ref 4–11)

## 2020-02-17 PROCEDURE — 85027 COMPLETE CBC AUTOMATED: CPT | Performed by: TRANSPLANT SURGERY

## 2020-02-17 PROCEDURE — 80158 DRUG ASSAY CYCLOSPORINE: CPT | Performed by: TRANSPLANT SURGERY

## 2020-02-17 PROCEDURE — 36415 COLL VENOUS BLD VENIPUNCTURE: CPT | Performed by: TRANSPLANT SURGERY

## 2020-02-17 PROCEDURE — 80076 HEPATIC FUNCTION PANEL: CPT | Performed by: TRANSPLANT SURGERY

## 2020-02-17 PROCEDURE — 83735 ASSAY OF MAGNESIUM: CPT | Performed by: TRANSPLANT SURGERY

## 2020-02-17 PROCEDURE — 84100 ASSAY OF PHOSPHORUS: CPT | Performed by: TRANSPLANT SURGERY

## 2020-02-17 PROCEDURE — 80048 BASIC METABOLIC PNL TOTAL CA: CPT | Performed by: TRANSPLANT SURGERY

## 2020-02-18 NOTE — TELEPHONE ENCOUNTER
Patient is set up for pre op on Friday with PCP office. Lab work sent to MD for review. Pt aware.

## 2020-02-28 RX ORDER — INDOMETHACIN 50 MG/1
100 SUPPOSITORY RECTAL
Status: CANCELLED | OUTPATIENT
Start: 2020-02-28

## 2020-02-28 RX ORDER — LIDOCAINE 40 MG/G
CREAM TOPICAL
Status: CANCELLED | OUTPATIENT
Start: 2020-02-28

## 2020-03-02 ENCOUNTER — TELEPHONE (OUTPATIENT)
Dept: TRANSPLANT | Facility: CLINIC | Age: 60
End: 2020-03-02

## 2020-03-02 NOTE — TELEPHONE ENCOUNTER
Provider Call: luis wanted to discuss her upcoming procedure  (On her knee) ERCP this week   And Injection questions    Call back needed? Yes  [facility]  # [phone] today,  l(eaving tomorrow 03/03/2020 her cell).    Return Call Needed  Same as documented in contacts section  When to return call?: Same day: Route High Priority

## 2020-03-02 NOTE — TELEPHONE ENCOUNTER
Patient can have upcoming ortho appt and have injection to prepare to see if a nerve ablation on knee would work or not.

## 2020-03-03 ENCOUNTER — TELEPHONE (OUTPATIENT)
Dept: MULTI SPECIALTY CLINIC | Facility: CLINIC | Age: 60
End: 2020-03-03

## 2020-03-03 ENCOUNTER — APPOINTMENT (OUTPATIENT)
Dept: ULTRASOUND IMAGING | Facility: CLINIC | Age: 60
End: 2020-03-03
Attending: EMERGENCY MEDICINE
Payer: COMMERCIAL

## 2020-03-03 ENCOUNTER — ANESTHESIA EVENT (OUTPATIENT)
Dept: SURGERY | Facility: CLINIC | Age: 60
End: 2020-03-03
Payer: COMMERCIAL

## 2020-03-03 ENCOUNTER — HOSPITAL ENCOUNTER (OUTPATIENT)
Facility: CLINIC | Age: 60
Discharge: HOME | End: 2020-03-04
Attending: EMERGENCY MEDICINE | Admitting: PHYSICIAN ASSISTANT
Payer: COMMERCIAL

## 2020-03-03 DIAGNOSIS — K83.1 BILE DUCT STRICTURE (H): ICD-10-CM

## 2020-03-03 DIAGNOSIS — Z79.899 LONG TERM CURRENT USE OF IMMUNOSUPPRESSIVE DRUG: ICD-10-CM

## 2020-03-03 DIAGNOSIS — Z94.4 LIVER REPLACED BY TRANSPLANT (H): ICD-10-CM

## 2020-03-03 DIAGNOSIS — R50.9 HYPERTHERMIA-INDUCED DEFECT: ICD-10-CM

## 2020-03-03 LAB
ALBUMIN SERPL-MCNC: 3.5 G/DL (ref 3.4–5)
ALBUMIN SERPL-MCNC: 3.5 G/DL (ref 3.4–5)
ALBUMIN UR-MCNC: NEGATIVE MG/DL
ALP SERPL-CCNC: 141 U/L (ref 40–150)
ALP SERPL-CCNC: 142 U/L (ref 40–150)
ALT SERPL W P-5'-P-CCNC: 19 U/L (ref 0–50)
ALT SERPL W P-5'-P-CCNC: 20 U/L (ref 0–50)
ANION GAP SERPL CALCULATED.3IONS-SCNC: 3 MMOL/L (ref 3–14)
ANION GAP SERPL CALCULATED.3IONS-SCNC: 5 MMOL/L (ref 3–14)
APPEARANCE UR: CLEAR
AST SERPL W P-5'-P-CCNC: 16 U/L (ref 0–45)
AST SERPL W P-5'-P-CCNC: 19 U/L (ref 0–45)
BASOPHILS # BLD AUTO: 0 10E9/L (ref 0–0.2)
BASOPHILS NFR BLD AUTO: 0 %
BILIRUB DIRECT SERPL-MCNC: 0.3 MG/DL (ref 0–0.2)
BILIRUB DIRECT SERPL-MCNC: 0.3 MG/DL (ref 0–0.2)
BILIRUB SERPL-MCNC: 0.9 MG/DL (ref 0.2–1.3)
BILIRUB SERPL-MCNC: 1.1 MG/DL (ref 0.2–1.3)
BILIRUB UR QL STRIP: NEGATIVE
BUN SERPL-MCNC: 33 MG/DL (ref 7–30)
BUN SERPL-MCNC: 35 MG/DL (ref 7–30)
CALCIUM SERPL-MCNC: 10 MG/DL (ref 8.5–10.1)
CALCIUM SERPL-MCNC: 9.5 MG/DL (ref 8.5–10.1)
CHLORIDE SERPL-SCNC: 106 MMOL/L (ref 94–109)
CHLORIDE SERPL-SCNC: 109 MMOL/L (ref 94–109)
CO2 SERPL-SCNC: 26 MMOL/L (ref 20–32)
CO2 SERPL-SCNC: 28 MMOL/L (ref 20–32)
COLOR UR AUTO: NORMAL
CREAT SERPL-MCNC: 1.21 MG/DL (ref 0.52–1.04)
CREAT SERPL-MCNC: 1.32 MG/DL (ref 0.52–1.04)
DIFFERENTIAL METHOD BLD: ABNORMAL
EOSINOPHIL # BLD AUTO: 0.2 10E9/L (ref 0–0.7)
EOSINOPHIL NFR BLD AUTO: 2.6 %
ERYTHROCYTE [DISTWIDTH] IN BLOOD BY AUTOMATED COUNT: 11.4 % (ref 10–15)
ERYTHROCYTE [DISTWIDTH] IN BLOOD BY AUTOMATED COUNT: 11.5 % (ref 10–15)
FLUAV+FLUBV RNA SPEC QL NAA+PROBE: NEGATIVE
FLUAV+FLUBV RNA SPEC QL NAA+PROBE: NEGATIVE
GFR SERPL CREATININE-BSD FRML MDRD: 44 ML/MIN/{1.73_M2}
GFR SERPL CREATININE-BSD FRML MDRD: 49 ML/MIN/{1.73_M2}
GLUCOSE SERPL-MCNC: 85 MG/DL (ref 70–99)
GLUCOSE SERPL-MCNC: 85 MG/DL (ref 70–99)
GLUCOSE UR STRIP-MCNC: NEGATIVE MG/DL
HCT VFR BLD AUTO: 30.1 % (ref 35–47)
HCT VFR BLD AUTO: 30.7 % (ref 35–47)
HGB BLD-MCNC: 10 G/DL (ref 11.7–15.7)
HGB BLD-MCNC: 10 G/DL (ref 11.7–15.7)
HGB UR QL STRIP: NEGATIVE
IMM GRANULOCYTES # BLD: 0 10E9/L (ref 0–0.4)
IMM GRANULOCYTES NFR BLD: 0.6 %
KETONES UR STRIP-MCNC: NEGATIVE MG/DL
LEUKOCYTE ESTERASE UR QL STRIP: NEGATIVE
LIPASE SERPL-CCNC: 22 U/L (ref 73–393)
LYMPHOCYTES # BLD AUTO: 0.8 10E9/L (ref 0.8–5.3)
LYMPHOCYTES NFR BLD AUTO: 12.1 %
MAGNESIUM SERPL-MCNC: 1.6 MG/DL (ref 1.6–2.3)
MCH RBC QN AUTO: 33.8 PG (ref 26.5–33)
MCH RBC QN AUTO: 34.5 PG (ref 26.5–33)
MCHC RBC AUTO-ENTMCNC: 32.6 G/DL (ref 31.5–36.5)
MCHC RBC AUTO-ENTMCNC: 33.2 G/DL (ref 31.5–36.5)
MCV RBC AUTO: 104 FL (ref 78–100)
MCV RBC AUTO: 104 FL (ref 78–100)
MONOCYTES # BLD AUTO: 0.7 10E9/L (ref 0–1.3)
MONOCYTES NFR BLD AUTO: 9.9 %
NEUTROPHILS # BLD AUTO: 5.2 10E9/L (ref 1.6–8.3)
NEUTROPHILS NFR BLD AUTO: 74.8 %
NITRATE UR QL: NEGATIVE
NRBC # BLD AUTO: 0 10*3/UL
NRBC BLD AUTO-RTO: 0 /100
PH UR STRIP: 5 PH (ref 5–7)
PHOSPHATE SERPL-MCNC: 3.8 MG/DL (ref 2.5–4.5)
PLATELET # BLD AUTO: 114 10E9/L (ref 150–450)
PLATELET # BLD AUTO: 123 10E9/L (ref 150–450)
POTASSIUM SERPL-SCNC: 4.3 MMOL/L (ref 3.4–5.3)
POTASSIUM SERPL-SCNC: 4.9 MMOL/L (ref 3.4–5.3)
PROT SERPL-MCNC: 6.7 G/DL (ref 6.8–8.8)
PROT SERPL-MCNC: 6.8 G/DL (ref 6.8–8.8)
RBC # BLD AUTO: 2.9 10E12/L (ref 3.8–5.2)
RBC # BLD AUTO: 2.96 10E12/L (ref 3.8–5.2)
RSV RNA SPEC NAA+PROBE: NEGATIVE
SODIUM SERPL-SCNC: 137 MMOL/L (ref 133–144)
SODIUM SERPL-SCNC: 140 MMOL/L (ref 133–144)
SOURCE: NORMAL
SP GR UR STRIP: 1 (ref 1–1.03)
SPECIMEN SOURCE: NORMAL
UROBILINOGEN UR STRIP-MCNC: NORMAL MG/DL (ref 0–2)
WBC # BLD AUTO: 6.4 10E9/L (ref 4–11)
WBC # BLD AUTO: 7 10E9/L (ref 4–11)

## 2020-03-03 PROCEDURE — 81003 URINALYSIS AUTO W/O SCOPE: CPT | Performed by: EMERGENCY MEDICINE

## 2020-03-03 PROCEDURE — 99285 EMERGENCY DEPT VISIT HI MDM: CPT | Mod: Z6 | Performed by: EMERGENCY MEDICINE

## 2020-03-03 PROCEDURE — 85025 COMPLETE CBC W/AUTO DIFF WBC: CPT | Performed by: EMERGENCY MEDICINE

## 2020-03-03 PROCEDURE — 76700 US EXAM ABDOM COMPLETE: CPT

## 2020-03-03 PROCEDURE — 83690 ASSAY OF LIPASE: CPT | Performed by: EMERGENCY MEDICINE

## 2020-03-03 PROCEDURE — 36415 COLL VENOUS BLD VENIPUNCTURE: CPT | Performed by: TRANSPLANT SURGERY

## 2020-03-03 PROCEDURE — 87631 RESP VIRUS 3-5 TARGETS: CPT | Performed by: EMERGENCY MEDICINE

## 2020-03-03 PROCEDURE — 83735 ASSAY OF MAGNESIUM: CPT | Performed by: TRANSPLANT SURGERY

## 2020-03-03 PROCEDURE — 80076 HEPATIC FUNCTION PANEL: CPT | Performed by: TRANSPLANT SURGERY

## 2020-03-03 PROCEDURE — 99285 EMERGENCY DEPT VISIT HI MDM: CPT | Mod: 25 | Performed by: EMERGENCY MEDICINE

## 2020-03-03 PROCEDURE — 80048 BASIC METABOLIC PNL TOTAL CA: CPT | Performed by: TRANSPLANT SURGERY

## 2020-03-03 PROCEDURE — 80158 DRUG ASSAY CYCLOSPORINE: CPT | Performed by: TRANSPLANT SURGERY

## 2020-03-03 PROCEDURE — 84100 ASSAY OF PHOSPHORUS: CPT | Performed by: TRANSPLANT SURGERY

## 2020-03-03 PROCEDURE — 85027 COMPLETE CBC AUTOMATED: CPT | Performed by: TRANSPLANT SURGERY

## 2020-03-03 PROCEDURE — 80048 BASIC METABOLIC PNL TOTAL CA: CPT | Performed by: EMERGENCY MEDICINE

## 2020-03-03 PROCEDURE — 80076 HEPATIC FUNCTION PANEL: CPT | Performed by: EMERGENCY MEDICINE

## 2020-03-03 PROCEDURE — 87040 BLOOD CULTURE FOR BACTERIA: CPT | Performed by: EMERGENCY MEDICINE

## 2020-03-03 RX ORDER — FENTANYL CITRATE 50 UG/ML
25-50 INJECTION, SOLUTION INTRAMUSCULAR; INTRAVENOUS EVERY 5 MIN PRN
Status: CANCELLED | OUTPATIENT
Start: 2020-03-03

## 2020-03-03 RX ORDER — SODIUM CHLORIDE, SODIUM LACTATE, POTASSIUM CHLORIDE, CALCIUM CHLORIDE 600; 310; 30; 20 MG/100ML; MG/100ML; MG/100ML; MG/100ML
INJECTION, SOLUTION INTRAVENOUS CONTINUOUS
Status: CANCELLED | OUTPATIENT
Start: 2020-03-03

## 2020-03-03 RX ORDER — ONDANSETRON 2 MG/ML
4 INJECTION INTRAMUSCULAR; INTRAVENOUS EVERY 30 MIN PRN
Status: CANCELLED | OUTPATIENT
Start: 2020-03-03

## 2020-03-03 RX ORDER — HYDROMORPHONE HYDROCHLORIDE 1 MG/ML
.3-.5 INJECTION, SOLUTION INTRAMUSCULAR; INTRAVENOUS; SUBCUTANEOUS EVERY 10 MIN PRN
Status: CANCELLED | OUTPATIENT
Start: 2020-03-03

## 2020-03-03 RX ORDER — NALOXONE HYDROCHLORIDE 0.4 MG/ML
.1-.4 INJECTION, SOLUTION INTRAMUSCULAR; INTRAVENOUS; SUBCUTANEOUS
Status: CANCELLED | OUTPATIENT
Start: 2020-03-03 | End: 2020-03-04

## 2020-03-03 RX ORDER — ONDANSETRON 4 MG/1
4 TABLET, ORALLY DISINTEGRATING ORAL EVERY 30 MIN PRN
Status: CANCELLED | OUTPATIENT
Start: 2020-03-03

## 2020-03-03 ASSESSMENT — ENCOUNTER SYMPTOMS
ABDOMINAL PAIN: 1
MYALGIAS: 0
CHILLS: 1
HEADACHES: 1
DIARRHEA: 0
FEVER: 1
COUGH: 0
VOMITING: 0
SORE THROAT: 0
NAUSEA: 1

## 2020-03-03 ASSESSMENT — MIFFLIN-ST. JEOR: SCORE: 1298.23

## 2020-03-04 ENCOUNTER — APPOINTMENT (OUTPATIENT)
Dept: GENERAL RADIOLOGY | Facility: CLINIC | Age: 60
End: 2020-03-04
Attending: INTERNAL MEDICINE
Payer: COMMERCIAL

## 2020-03-04 ENCOUNTER — ANESTHESIA (OUTPATIENT)
Dept: SURGERY | Facility: CLINIC | Age: 60
End: 2020-03-04
Payer: COMMERCIAL

## 2020-03-04 VITALS
RESPIRATION RATE: 16 BRPM | HEIGHT: 62 IN | BODY MASS INDEX: 31.74 KG/M2 | TEMPERATURE: 96.3 F | DIASTOLIC BLOOD PRESSURE: 67 MMHG | SYSTOLIC BLOOD PRESSURE: 128 MMHG | HEART RATE: 63 BPM | WEIGHT: 172.5 LBS | OXYGEN SATURATION: 96 %

## 2020-03-04 PROBLEM — K83.09 CHOLANGITIS (H): Status: ACTIVE | Noted: 2020-03-04

## 2020-03-04 PROBLEM — R50.9 FEVER: Status: ACTIVE | Noted: 2020-03-04

## 2020-03-04 LAB
ALBUMIN SERPL-MCNC: 3.4 G/DL (ref 3.4–5)
ALP SERPL-CCNC: 136 U/L (ref 40–150)
ALT SERPL W P-5'-P-CCNC: 17 U/L (ref 0–50)
ANION GAP SERPL CALCULATED.3IONS-SCNC: 4 MMOL/L (ref 3–14)
APTT PPP: 39 SEC (ref 22–37)
AST SERPL W P-5'-P-CCNC: 15 U/L (ref 0–45)
BASOPHILS # BLD AUTO: 0 10E9/L (ref 0–0.2)
BASOPHILS NFR BLD AUTO: 0.1 %
BILIRUB DIRECT SERPL-MCNC: 0.3 MG/DL (ref 0–0.2)
BILIRUB SERPL-MCNC: 1.3 MG/DL (ref 0.2–1.3)
BUN SERPL-MCNC: 30 MG/DL (ref 7–30)
CALCIUM SERPL-MCNC: 9.2 MG/DL (ref 8.5–10.1)
CHLORIDE SERPL-SCNC: 108 MMOL/L (ref 94–109)
CO2 SERPL-SCNC: 27 MMOL/L (ref 20–32)
CREAT SERPL-MCNC: 1.22 MG/DL (ref 0.52–1.04)
CYCLOSPORINE BLD LC/MS/MS-MCNC: 185 UG/L (ref 50–400)
DIFFERENTIAL METHOD BLD: ABNORMAL
EOSINOPHIL # BLD AUTO: 0.2 10E9/L (ref 0–0.7)
EOSINOPHIL NFR BLD AUTO: 2.4 %
ERCP: NORMAL
ERYTHROCYTE [DISTWIDTH] IN BLOOD BY AUTOMATED COUNT: 11.4 % (ref 10–15)
GFR SERPL CREATININE-BSD FRML MDRD: 48 ML/MIN/{1.73_M2}
GLUCOSE BLDC GLUCOMTR-MCNC: 84 MG/DL (ref 70–99)
GLUCOSE SERPL-MCNC: 96 MG/DL (ref 70–99)
HCT VFR BLD AUTO: 30.5 % (ref 35–47)
HGB BLD-MCNC: 10.2 G/DL (ref 11.7–15.7)
IMM GRANULOCYTES # BLD: 0 10E9/L (ref 0–0.4)
IMM GRANULOCYTES NFR BLD: 0.4 %
INR PPP: 1.16 (ref 0.86–1.14)
LIPASE SERPL-CCNC: 20 U/L (ref 73–393)
LYMPHOCYTES # BLD AUTO: 1 10E9/L (ref 0.8–5.3)
LYMPHOCYTES NFR BLD AUTO: 14.5 %
MAGNESIUM SERPL-MCNC: 1.6 MG/DL (ref 1.6–2.3)
MCH RBC QN AUTO: 33.9 PG (ref 26.5–33)
MCHC RBC AUTO-ENTMCNC: 33.4 G/DL (ref 31.5–36.5)
MCV RBC AUTO: 101 FL (ref 78–100)
MONOCYTES # BLD AUTO: 0.9 10E9/L (ref 0–1.3)
MONOCYTES NFR BLD AUTO: 12.4 %
NEUTROPHILS # BLD AUTO: 4.9 10E9/L (ref 1.6–8.3)
NEUTROPHILS NFR BLD AUTO: 70.2 %
NRBC # BLD AUTO: 0 10*3/UL
NRBC BLD AUTO-RTO: 0 /100
PHOSPHATE SERPL-MCNC: 3.8 MG/DL (ref 2.5–4.5)
PLATELET # BLD AUTO: 124 10E9/L (ref 150–450)
POTASSIUM SERPL-SCNC: 4.4 MMOL/L (ref 3.4–5.3)
PROT SERPL-MCNC: 6.6 G/DL (ref 6.8–8.8)
RBC # BLD AUTO: 3.01 10E12/L (ref 3.8–5.2)
SODIUM SERPL-SCNC: 139 MMOL/L (ref 133–144)
TME LAST DOSE: 2120 H
WBC # BLD AUTO: 7 10E9/L (ref 4–11)

## 2020-03-04 PROCEDURE — 25000566 ZZH SEVOFLURANE, EA 15 MIN: Performed by: INTERNAL MEDICINE

## 2020-03-04 PROCEDURE — 27210794 ZZH OR GENERAL SUPPLY STERILE: Performed by: INTERNAL MEDICINE

## 2020-03-04 PROCEDURE — G0378 HOSPITAL OBSERVATION PER HR: HCPCS

## 2020-03-04 PROCEDURE — C1769 GUIDE WIRE: HCPCS | Performed by: INTERNAL MEDICINE

## 2020-03-04 PROCEDURE — 85730 THROMBOPLASTIN TIME PARTIAL: CPT

## 2020-03-04 PROCEDURE — 85025 COMPLETE CBC W/AUTO DIFF WBC: CPT

## 2020-03-04 PROCEDURE — 25500064 ZZH RX 255 OP 636: Performed by: INTERNAL MEDICINE

## 2020-03-04 PROCEDURE — 25800030 ZZH RX IP 258 OP 636

## 2020-03-04 PROCEDURE — 25000128 H RX IP 250 OP 636

## 2020-03-04 PROCEDURE — 00000146 ZZHCL STATISTIC GLUCOSE BY METER IP

## 2020-03-04 PROCEDURE — 40000170 ZZH STATISTIC PRE-PROCEDURE ASSESSMENT II: Performed by: INTERNAL MEDICINE

## 2020-03-04 PROCEDURE — 25000128 H RX IP 250 OP 636: Performed by: EMERGENCY MEDICINE

## 2020-03-04 PROCEDURE — 84100 ASSAY OF PHOSPHORUS: CPT

## 2020-03-04 PROCEDURE — C1874 STENT, COATED/COV W/DEL SYS: HCPCS | Performed by: INTERNAL MEDICINE

## 2020-03-04 PROCEDURE — 36000053 ZZH SURGERY LEVEL 2 EA 15 ADDTL MIN - UMMC: Performed by: INTERNAL MEDICINE

## 2020-03-04 PROCEDURE — 40000279 XR SURGERY CARM FLUORO GREATER THAN 5 MIN W STILLS: Mod: TC

## 2020-03-04 PROCEDURE — 25000131 ZZH RX MED GY IP 250 OP 636 PS 637

## 2020-03-04 PROCEDURE — 25000125 ZZHC RX 250: Performed by: INTERNAL MEDICINE

## 2020-03-04 PROCEDURE — 36000055 ZZH SURGERY LEVEL 2 W FLUORO 1ST 30 MIN - UMMC: Performed by: INTERNAL MEDICINE

## 2020-03-04 PROCEDURE — 37000009 ZZH ANESTHESIA TECHNICAL FEE, EACH ADDTL 15 MIN: Performed by: INTERNAL MEDICINE

## 2020-03-04 PROCEDURE — 25000128 H RX IP 250 OP 636: Performed by: PHYSICIAN ASSISTANT

## 2020-03-04 PROCEDURE — 25000128 H RX IP 250 OP 636: Performed by: NURSE ANESTHETIST, CERTIFIED REGISTERED

## 2020-03-04 PROCEDURE — 80048 BASIC METABOLIC PNL TOTAL CA: CPT

## 2020-03-04 PROCEDURE — C1726 CATH, BAL DIL, NON-VASCULAR: HCPCS | Performed by: INTERNAL MEDICINE

## 2020-03-04 PROCEDURE — 85610 PROTHROMBIN TIME: CPT

## 2020-03-04 PROCEDURE — 96365 THER/PROPH/DIAG IV INF INIT: CPT | Performed by: EMERGENCY MEDICINE

## 2020-03-04 PROCEDURE — 25000125 ZZHC RX 250: Performed by: NURSE ANESTHETIST, CERTIFIED REGISTERED

## 2020-03-04 PROCEDURE — 83690 ASSAY OF LIPASE: CPT

## 2020-03-04 PROCEDURE — 36415 COLL VENOUS BLD VENIPUNCTURE: CPT

## 2020-03-04 PROCEDURE — 71000014 ZZH RECOVERY PHASE 1 LEVEL 2 FIRST HR: Performed by: INTERNAL MEDICINE

## 2020-03-04 PROCEDURE — 25000132 ZZH RX MED GY IP 250 OP 250 PS 637

## 2020-03-04 PROCEDURE — 25000132 ZZH RX MED GY IP 250 OP 250 PS 637: Performed by: EMERGENCY MEDICINE

## 2020-03-04 PROCEDURE — 25000132 ZZH RX MED GY IP 250 OP 250 PS 637: Performed by: ANESTHESIOLOGY

## 2020-03-04 PROCEDURE — 25800030 ZZH RX IP 258 OP 636: Performed by: NURSE ANESTHETIST, CERTIFIED REGISTERED

## 2020-03-04 PROCEDURE — 25000128 H RX IP 250 OP 636: Performed by: ANESTHESIOLOGY

## 2020-03-04 PROCEDURE — 80076 HEPATIC FUNCTION PANEL: CPT

## 2020-03-04 PROCEDURE — 83735 ASSAY OF MAGNESIUM: CPT

## 2020-03-04 PROCEDURE — 37000008 ZZH ANESTHESIA TECHNICAL FEE, 1ST 30 MIN: Performed by: INTERNAL MEDICINE

## 2020-03-04 DEVICE — STENT BILIARY WALLFLEX COVERED 10X40MM M00570360
Type: IMPLANTABLE DEVICE | Site: PANCREATIC DUCT | Status: NON-FUNCTIONAL
Removed: 2021-05-12

## 2020-03-04 RX ORDER — OXYCODONE HYDROCHLORIDE 5 MG/1
5-10 TABLET ORAL
Status: DISCONTINUED | OUTPATIENT
Start: 2020-03-04 | End: 2020-03-04 | Stop reason: HOSPADM

## 2020-03-04 RX ORDER — ACETAMINOPHEN 500 MG
1000 TABLET ORAL ONCE
Status: COMPLETED | OUTPATIENT
Start: 2020-03-04 | End: 2020-03-04

## 2020-03-04 RX ORDER — CIPROFLOXACIN 500 MG/1
500 TABLET, FILM COATED ORAL EVERY 12 HOURS SCHEDULED
Status: DISCONTINUED | OUTPATIENT
Start: 2020-03-04 | End: 2020-03-04 | Stop reason: HOSPADM

## 2020-03-04 RX ORDER — LIDOCAINE 40 MG/G
CREAM TOPICAL
Status: DISCONTINUED | OUTPATIENT
Start: 2020-03-04 | End: 2020-03-04 | Stop reason: HOSPADM

## 2020-03-04 RX ORDER — CYCLOSPORINE 25 MG/1
50 CAPSULE, LIQUID FILLED ORAL EVERY MORNING
Status: DISCONTINUED | OUTPATIENT
Start: 2020-03-04 | End: 2020-03-04

## 2020-03-04 RX ORDER — NALOXONE HYDROCHLORIDE 0.4 MG/ML
.1-.4 INJECTION, SOLUTION INTRAMUSCULAR; INTRAVENOUS; SUBCUTANEOUS
Status: DISCONTINUED | OUTPATIENT
Start: 2020-03-04 | End: 2020-03-04 | Stop reason: HOSPADM

## 2020-03-04 RX ORDER — PIPERACILLIN SODIUM, TAZOBACTAM SODIUM 3; .375 G/15ML; G/15ML
3.38 INJECTION, POWDER, LYOPHILIZED, FOR SOLUTION INTRAVENOUS ONCE
Status: COMPLETED | OUTPATIENT
Start: 2020-03-04 | End: 2020-03-04

## 2020-03-04 RX ORDER — PIPERACILLIN SODIUM, TAZOBACTAM SODIUM 3; .375 G/15ML; G/15ML
3.38 INJECTION, POWDER, LYOPHILIZED, FOR SOLUTION INTRAVENOUS EVERY 6 HOURS
Status: DISCONTINUED | OUTPATIENT
Start: 2020-03-04 | End: 2020-03-04 | Stop reason: HOSPADM

## 2020-03-04 RX ORDER — INDOMETHACIN 50 MG/1
100 SUPPOSITORY RECTAL
Status: DISCONTINUED | OUTPATIENT
Start: 2020-03-04 | End: 2020-03-04 | Stop reason: HOSPADM

## 2020-03-04 RX ORDER — SODIUM CHLORIDE 9 MG/ML
INJECTION, SOLUTION INTRAVENOUS CONTINUOUS
Status: DISCONTINUED | OUTPATIENT
Start: 2020-03-04 | End: 2020-03-04 | Stop reason: HOSPADM

## 2020-03-04 RX ORDER — DEXAMETHASONE SODIUM PHOSPHATE 4 MG/ML
INJECTION, SOLUTION INTRA-ARTICULAR; INTRALESIONAL; INTRAMUSCULAR; INTRAVENOUS; SOFT TISSUE PRN
Status: DISCONTINUED | OUTPATIENT
Start: 2020-03-04 | End: 2020-03-04

## 2020-03-04 RX ORDER — OXYCODONE HYDROCHLORIDE 5 MG/1
5 TABLET ORAL EVERY 6 HOURS PRN
Qty: 5 TABLET | Refills: 0 | Status: SHIPPED | OUTPATIENT
Start: 2020-03-04 | End: 2020-06-25

## 2020-03-04 RX ORDER — FAMOTIDINE 20 MG/1
20 TABLET, FILM COATED ORAL 2 TIMES DAILY
Status: DISCONTINUED | OUTPATIENT
Start: 2020-03-04 | End: 2020-03-04 | Stop reason: HOSPADM

## 2020-03-04 RX ORDER — EPHEDRINE SULFATE 50 MG/ML
INJECTION, SOLUTION INTRAMUSCULAR; INTRAVENOUS; SUBCUTANEOUS PRN
Status: DISCONTINUED | OUTPATIENT
Start: 2020-03-04 | End: 2020-03-04

## 2020-03-04 RX ORDER — IOPAMIDOL 510 MG/ML
INJECTION, SOLUTION INTRAVASCULAR PRN
Status: DISCONTINUED | OUTPATIENT
Start: 2020-03-04 | End: 2020-03-04 | Stop reason: HOSPADM

## 2020-03-04 RX ORDER — CYCLOSPORINE 25 MG/1
75 CAPSULE, LIQUID FILLED ORAL EVERY EVENING
Status: DISCONTINUED | OUTPATIENT
Start: 2020-03-04 | End: 2020-03-04

## 2020-03-04 RX ORDER — HYDROMORPHONE HYDROCHLORIDE 1 MG/ML
0.3 INJECTION, SOLUTION INTRAMUSCULAR; INTRAVENOUS; SUBCUTANEOUS ONCE
Status: COMPLETED | OUTPATIENT
Start: 2020-03-04 | End: 2020-03-04

## 2020-03-04 RX ORDER — FENTANYL CITRATE 50 UG/ML
25-50 INJECTION, SOLUTION INTRAMUSCULAR; INTRAVENOUS EVERY 5 MIN PRN
Status: DISCONTINUED | OUTPATIENT
Start: 2020-03-04 | End: 2020-03-04 | Stop reason: HOSPADM

## 2020-03-04 RX ORDER — LIDOCAINE HYDROCHLORIDE 20 MG/ML
INJECTION, SOLUTION INFILTRATION; PERINEURAL PRN
Status: DISCONTINUED | OUTPATIENT
Start: 2020-03-04 | End: 2020-03-04

## 2020-03-04 RX ORDER — PROPOFOL 10 MG/ML
INJECTION, EMULSION INTRAVENOUS PRN
Status: DISCONTINUED | OUTPATIENT
Start: 2020-03-04 | End: 2020-03-04

## 2020-03-04 RX ORDER — OXYCODONE AND ACETAMINOPHEN 5; 325 MG/1; MG/1
1 TABLET ORAL ONCE
Status: COMPLETED | OUTPATIENT
Start: 2020-03-04 | End: 2020-03-04

## 2020-03-04 RX ORDER — CYCLOSPORINE 25 MG/1
100 CAPSULE, LIQUID FILLED ORAL 2 TIMES DAILY
Status: DISCONTINUED | OUTPATIENT
Start: 2020-03-04 | End: 2020-03-04

## 2020-03-04 RX ORDER — CIPROFLOXACIN 500 MG/1
500 TABLET, FILM COATED ORAL EVERY 12 HOURS
Qty: 14 TABLET | Refills: 0 | Status: SHIPPED | OUTPATIENT
Start: 2020-03-04 | End: 2020-05-11

## 2020-03-04 RX ORDER — ONDANSETRON 4 MG/1
4 TABLET, ORALLY DISINTEGRATING ORAL EVERY 6 HOURS PRN
Status: DISCONTINUED | OUTPATIENT
Start: 2020-03-04 | End: 2020-03-04 | Stop reason: HOSPADM

## 2020-03-04 RX ORDER — FENTANYL CITRATE 50 UG/ML
INJECTION, SOLUTION INTRAMUSCULAR; INTRAVENOUS PRN
Status: DISCONTINUED | OUTPATIENT
Start: 2020-03-04 | End: 2020-03-04

## 2020-03-04 RX ORDER — FLUMAZENIL 0.1 MG/ML
0.2 INJECTION, SOLUTION INTRAVENOUS
Status: DISCONTINUED | OUTPATIENT
Start: 2020-03-04 | End: 2020-03-04 | Stop reason: HOSPADM

## 2020-03-04 RX ORDER — LEVOTHYROXINE SODIUM 150 UG/1
150 TABLET ORAL
Status: DISCONTINUED | OUTPATIENT
Start: 2020-03-04 | End: 2020-03-04 | Stop reason: HOSPADM

## 2020-03-04 RX ORDER — SODIUM CHLORIDE, SODIUM LACTATE, POTASSIUM CHLORIDE, CALCIUM CHLORIDE 600; 310; 30; 20 MG/100ML; MG/100ML; MG/100ML; MG/100ML
INJECTION, SOLUTION INTRAVENOUS CONTINUOUS PRN
Status: DISCONTINUED | OUTPATIENT
Start: 2020-03-04 | End: 2020-03-04

## 2020-03-04 RX ORDER — ONDANSETRON 2 MG/ML
4 INJECTION INTRAMUSCULAR; INTRAVENOUS EVERY 6 HOURS PRN
Status: DISCONTINUED | OUTPATIENT
Start: 2020-03-04 | End: 2020-03-04 | Stop reason: HOSPADM

## 2020-03-04 RX ORDER — ONDANSETRON 2 MG/ML
INJECTION INTRAMUSCULAR; INTRAVENOUS PRN
Status: DISCONTINUED | OUTPATIENT
Start: 2020-03-04 | End: 2020-03-04

## 2020-03-04 RX ADMIN — SODIUM CHLORIDE, POTASSIUM CHLORIDE, SODIUM LACTATE AND CALCIUM CHLORIDE: 600; 310; 30; 20 INJECTION, SOLUTION INTRAVENOUS at 07:21

## 2020-03-04 RX ADMIN — PROPOFOL 100 MG: 10 INJECTION, EMULSION INTRAVENOUS at 07:31

## 2020-03-04 RX ADMIN — Medication 10 MG: at 07:53

## 2020-03-04 RX ADMIN — CYCLOSPORINE 150 MG: 100 CAPSULE, LIQUID FILLED ORAL at 10:53

## 2020-03-04 RX ADMIN — PIPERACILLIN SODIUM AND TAZOBACTAM SODIUM 3.38 G: 3; .375 INJECTION, POWDER, LYOPHILIZED, FOR SOLUTION INTRAVENOUS at 08:00

## 2020-03-04 RX ADMIN — PIPERACILLIN SODIUM AND TAZOBACTAM SODIUM 3.38 G: 3; .375 INJECTION, POWDER, LYOPHILIZED, FOR SOLUTION INTRAVENOUS at 14:03

## 2020-03-04 RX ADMIN — SUGAMMADEX 200 MG: 100 INJECTION, SOLUTION INTRAVENOUS at 08:35

## 2020-03-04 RX ADMIN — LIDOCAINE HYDROCHLORIDE 100 MG: 20 INJECTION, SOLUTION INFILTRATION; PERINEURAL at 07:31

## 2020-03-04 RX ADMIN — FENTANYL CITRATE 100 MCG: 50 INJECTION, SOLUTION INTRAMUSCULAR; INTRAVENOUS at 07:24

## 2020-03-04 RX ADMIN — HYDROMORPHONE HYDROCHLORIDE 0.3 MG: 1 INJECTION, SOLUTION INTRAMUSCULAR; INTRAVENOUS; SUBCUTANEOUS at 09:44

## 2020-03-04 RX ADMIN — GLUCAGON HYDROCHLORIDE 0.4 MG: KIT at 08:07

## 2020-03-04 RX ADMIN — MIDAZOLAM 1 MG: 1 INJECTION INTRAMUSCULAR; INTRAVENOUS at 07:24

## 2020-03-04 RX ADMIN — ONDANSETRON 4 MG: 2 INJECTION INTRAMUSCULAR; INTRAVENOUS at 08:33

## 2020-03-04 RX ADMIN — SODIUM CHLORIDE: 9 INJECTION, SOLUTION INTRAVENOUS at 04:28

## 2020-03-04 RX ADMIN — OXYCODONE HYDROCHLORIDE 5 MG: 5 TABLET ORAL at 13:23

## 2020-03-04 RX ADMIN — FENTANYL CITRATE 25 MCG: 50 INJECTION, SOLUTION INTRAMUSCULAR; INTRAVENOUS at 09:12

## 2020-03-04 RX ADMIN — ROCURONIUM BROMIDE 50 MG: 10 INJECTION INTRAVENOUS at 07:31

## 2020-03-04 RX ADMIN — OXYCODONE HYDROCHLORIDE AND ACETAMINOPHEN 1 TABLET: 5; 325 TABLET ORAL at 10:20

## 2020-03-04 RX ADMIN — ACETAMINOPHEN 1000 MG: 500 TABLET, FILM COATED ORAL at 01:54

## 2020-03-04 RX ADMIN — FAMOTIDINE 20 MG: 20 TABLET, FILM COATED ORAL at 10:53

## 2020-03-04 RX ADMIN — PIPERACILLIN AND TAZOBACTAM 3.38 G: 3; .375 INJECTION, POWDER, FOR SOLUTION INTRAVENOUS at 01:42

## 2020-03-04 RX ADMIN — DEXAMETHASONE SODIUM PHOSPHATE 4 MG: 4 INJECTION, SOLUTION INTRA-ARTICULAR; INTRALESIONAL; INTRAMUSCULAR; INTRAVENOUS; SOFT TISSUE at 07:52

## 2020-03-04 RX ADMIN — LEVOTHYROXINE SODIUM 150 MCG: 150 TABLET ORAL at 10:52

## 2020-03-04 ASSESSMENT — ACTIVITIES OF DAILY LIVING (ADL)
ADLS_ACUITY_SCORE: 18
ADLS_ACUITY_SCORE: 18

## 2020-03-04 ASSESSMENT — MIFFLIN-ST. JEOR: SCORE: 1310.7

## 2020-03-04 ASSESSMENT — PAIN DESCRIPTION - DESCRIPTORS
DESCRIPTORS: ACHING
DESCRIPTORS: ACHING;CONSTANT

## 2020-03-04 NOTE — UTILIZATION REVIEW
"Admission Status; Secondary Review Determination     Under the authority of the Utilization Management Committee, the utilization review process indicated a secondary review on the above patient.  The review outcome is based on review of the medical records, discussions with staff, and applying clinical experience noted on the date of the review.       (x) Observation Status Appropriate - This patient does not meet hospital inpatient criteria and is placed in observation status. If this patient's primary payer is Medicare and was admitted as an inpatient, Condition Code 44 should be used and patient status changed to \"observation\".     RATIONALE FOR DETERMINATION: 59-year-old female with history of cirrhosis status post liver transplant this summer and an ERCP with a stent placement 2/10/2020 with a scheduled follow-up stent replacement scheduled for the day after admission.  Patient felt feverish at home and was sent to the emergency room for evaluation for possible biliary infection related to stent malfunction.  Patient found to be afebrile in the emergency room with normal white blood count therefore observation care appropriate for empiric antibiotics and supportive cares awaiting definitive ERCP and stent replacement.  Case discussed with treating team.      The severity of illness, intensity of service provided, expected LOS and risk for adverse outcome make the care appropriate for further observation; however, doesn't meet criteria for hospital inpatient admission. This was discussed with attending physician who concurred with this determination.    The information on this document is developed by the utilization review team in order for the business office to ensure compliance.  This only denotes the appropriateness of proper admission status and does not reflect the quality of care rendered.         The definitions of Inpatient Status and Observation Status used in making the determination above are those " provided in the CMS Coverage Manual, Chapter 1 and Chapter 6, section 70.4.      Sincerely,     Jefferson Mercedes MD    Physician Advisor  Utilization Review/ Case Management  [facility].

## 2020-03-04 NOTE — OR NURSING
Dr Moreno called [id number] for sign-out when ready, bedside 1000 and verbalized ready for sign-out of PACU

## 2020-03-04 NOTE — TELEPHONE ENCOUNTER
Brief GI progress note (On-Call Overnight)    59 year old white female with past medical history of obesity, lymphedema, and cirrhosis secondary to combination of nonalcoholic fatty liver disease, iron overload, and alpha-1 antitrypsin MZ phenotype who is s/p  donor liver transplant in 2019.      She last had an ERCP on 2/10/2020 w/ Dr. Canada for evaluation of elevated LFT's and US showing echogenic material within the CBD.   Per report, a sphincterotomy was performed with sludge removed.  High-grade anastomotic stricture was visualized, with initial difficulty to get the wire axis, and marked upstream dilation of the intra-intrahepatics was noted.  The common bile duct was also dilated downstream.  110 French by 20 cm Jolin plastic biliary stent was placed across the anastomotic stricture into the left hepatic duct.  Plan was to repeat ERCP in 4 weeks to reevaluate the anastomotic stricture.    Paged by patient overnight regarding temperature of 100.7.  Patient states that she has been feeling crummy and poorly all day long, and has taken her temperature a couple of times at home, found to be 100.4  F and 100.7  F.  She states that she has had some abdominal pain as well, though she is not sure if this was associated with some pork that she ate.  Other family members who ate the pork feel just fine.  She states that she also has had some intermittent nausea.  Of note, she just had her labs drawn today.  Her LFTs appear actually improved from prior drawn on .    Her total bilirubin is 0.9 (down from 2.2) her alkaline phosphatase is 141 (down from 216).  Her ALT and AST have normalized.  Her direct bilirubin remains slightly elevated at 0.3 down from 0.5 prior.    Case was discussed with my staff, Dr. Roque.  Plan will be to have patient present to the emergency department for admission, observation, and stabilization for anticipated ERCP tomorrow morning at 7:30 AM.    Patient is in  agreement with this plan.

## 2020-03-04 NOTE — ED TRIAGE NOTES
Pt arrives to triage with reports of fever at home max of 100.7.  Pt was scheduled to have an ERCP tomorrow but was told to come in to be evaluated to ensure she would still be able to have the procedure.  Hx liver transplant.

## 2020-03-04 NOTE — ANESTHESIA PREPROCEDURE EVALUATION
Anesthesia Pre-Procedure Evaluation    Patient: Nicci Pemberton   MRN:     [id number] Gender:   female   Age:    59 year old :      1960        Preoperative Diagnosis: Bile duct stricture [K83.1]   Procedure(s):  ENDOSCOPIC RETROGRADE CHOLANGIOPANCREATOGRAPHY     LABS:  CBC:   Lab Results   Component Value Date    WBC 6.4 2020    WBC 4.5 2020    HGB 10.0 (L) 2020    HGB 9.9 (L) 2020    HCT 30.1 (L) 2020    HCT 30.7 (L) 2020     (L) 2020     (L) 2020     BMP:   Lab Results   Component Value Date     2020     2020    POTASSIUM 4.9 2020    POTASSIUM 4.8 2020    CHLORIDE 109 2020    CHLORIDE 108 2020    CO2 26 2020    CO2 30 2020    BUN 35 (H) 2020    BUN 38 (H) 2020    CR 1.32 (H) 2020    CR 1.36 (H) 2020    GLC 85 2020    GLC 75 2020     COAGS:   Lab Results   Component Value Date    PTT 31 10/05/2019    INR 1.19 (H) 10/05/2019    FIBR 317 2019     POC:   Lab Results   Component Value Date    BGM 88 02/10/2020    HCGS Negative 2018     OTHER:   Lab Results   Component Value Date    PH 7.46 (H) 2019    LACT 0.7 2019    A1C 5.0 2019    JACOB 10.0 2020    PHOS 3.8 2020    MAG 1.6 2020    ALBUMIN 3.5 2020    PROTTOTAL 6.8 2020    ALT 20 2020    AST 19 2020    ALKPHOS 141 2020    BILITOTAL 0.9 2020    LIPASE 1,481 (H) 02/10/2020    AMYLASE 96 02/10/2020    DOROTHEA 23 2019    TSH 0.81 2020    T4 0.70 (L) 2019    CRP 8.9 (H) 2018    SED 17 2018        Preop Vitals    BP Readings from Last 3 Encounters:   02/10/20 (!) 141/84   20 124/79   20 119/78    Pulse Readings from Last 3 Encounters:   02/10/20 53   20 85   20 68      Resp Readings from Last 3 Encounters:   02/10/20 16   19 16   10/31/19 17    SpO2 Readings from Last 3  "Encounters:   02/10/20 94%   20 96%   19 99%      Temp Readings from Last 1 Encounters:   02/10/20 36.6  C (97.9  F) (Oral)    Ht Readings from Last 1 Encounters:   02/10/20 1.6 m (5' 3\")      Wt Readings from Last 1 Encounters:   02/10/20 78.3 kg (172 lb 9.9 oz)    Estimated body mass index is 30.58 kg/m  as calculated from the following:    Height as of 2/10/20: 1.6 m (5' 3\").    Weight as of 2/10/20: 78.3 kg (172 lb 9.9 oz).     LDA:        Past Medical History:   Diagnosis Date     Anemia      Cellulitis      Cirrhosis of liver (H) 2018     Heterozygous alpha 1-antitrypsin deficiency (H)      High hepatic iron concentration determined by biopsy of liver      Liver cirrhosis secondary to ANGULO (H)      Liver transplanted (H) 2019     Lymphedema      Osteoarthritis     knees     SBP (spontaneous bacterial peritonitis) (H) 2019 in [place]      Past Surgical History:   Procedure Laterality Date     BENCH LIVER N/A 2019    Procedure: BACKBENCH PREPARATION, LIVER;  Surgeon: Mandeep Alford MD;  Location: [facility] OR     COLONOSCOPY N/A 2018    Procedure: COLONOSCOPY;  colonoscopy;  Surgeon: Hugo Patel MD;  Location: [facility] OR     ENDOSCOPIC RETROGRADE CHOLANGIOPANCREATOGRAM N/A 2/10/2020    Procedure: ENDOSCOPIC RETROGRADE CHOLANGIOPANCREATOGRAPHY with spinchterotomy;  Surgeon: Guru Rigoberto Canada MD;  Location: [facility] OR     ESOPHAGOSCOPY, GASTROSCOPY, DUODENOSCOPY (EGD), COMBINED N/A 10/31/2019    Procedure: Esophagogastroduodenoscopy, With Biopsy;  Surgeon: Nerissa Aranda MD;  Location: [facility] GI     IR FEEDING TUBE PLACEMENT MERCEDEZ PRESTON/MD  2019     IR FLUORO 0-1 HOUR  2019     IR LUMBAR PUNCTURE  2019     TRANSPLANT LIVER RECIPIENT  DONOR N/A 2019    Procedure: TRANSPLANT, LIVER, RECIPIENT,  DONOR;  Surgeon: Mandeep Alford MD;  Location: [facility] OR      Allergies   Allergen Reactions     Food      Fruits with cores " "and pits  Apples     Sulfa Drugs Unknown     Swollen joints     Furosemide Rash        Anesthesia Evaluation     . Pt has had prior anesthetic. Type: General (MAC 4, 7 ETT)           ROS/MED HX    ENT/Pulmonary:       Neurologic:       Cardiovascular:     (+) ----. : . . . :. . Previous cardiac testing date:results:Stress Testdate:7/11/19 results:dobutamine stress echocardiogram test negative for ischemia at diagnostic level of peak stress (87% MPHR).ECG reviewed date:10/4/19 results:SR, ISABELLA, NSSTT changes date: results:          METS/Exercise Tolerance:     Hematologic:     (+) Anemia, -      Musculoskeletal:   (+) arthritis,  -       GI/Hepatic: Comment: Hx of ANGULO and Heterozygous alpha 1 antitrypsin deficiency s/p Liver Transplant 8/18/19.  Elevated LFTs  Per imaging report \"common bile duct measures 8 mm with nonspecific echogenic intraluminal content\"        (+) GERD       Renal/Genitourinary:     (+) chronic renal disease,       Endo:         Psychiatric:         Infectious Disease:         Malignancy:         Other:                         PHYSICAL EXAM:   Mental Status/Neuro: A/A/O; Age Appropriate   Airway: Facies: Feasible  Mallampati: I  Mouth/Opening: Full  TM distance: > 6 cm  Neck ROM: Full   Respiratory: Auscultation: CTAB     Resp. Rate: Normal     Resp. Effort: Normal      CV: Rhythm: Regular  Edema: None   Comments:      Dental: Normal Dentition                Assessment:   ASA SCORE: 3    H&P: History and physical reviewed and following examination; no interval change.   Smoking Status:  Non-Smoker/Unknown   NPO Status: NPO Appropriate     Plan:   Anes. Type:  General   Pre-Medication: Midazolam   Induction:  IV (Standard)   Airway: ETT; Oral   Access/Monitoring: PIV   Maintenance: Balanced     Postop Plan:   Postop Pain: Opioids  Postop Sedation/Airway: Not planned  Disposition: Outpatient     PONV Management:   Adult Risk Factors: Female, Non-Smoker, Postop Opioids   Prevention: Ondansetron, " Propofol     CONSENT: Direct conversation   Plan and risks discussed with: Patient   Blood Products: Consent Deferred (Minimal Blood Loss)       Comments for Plan/Consent:  Plan:  GA/ETT, Standard ASA monitors.  IV induction. Maintenance with anesthetic gas &/or IV meds.  PACU after extubation.    Discussed anesthetic procedure and risks  with the patient which include, but are not limited to:  sore throat, PONV, dental/oral trauma, (and if T&S done), administration of blood products.  Questions welcomed.  Verbalized understanding and agrees to proceed.                 Sulma Moreno MD

## 2020-03-04 NOTE — ANESTHESIA POSTPROCEDURE EVALUATION
Anesthesia POST Procedure Evaluation    Patient: iNcci Pemberton   MRN:     [id number] Gender:   female   Age:    59 year old :      1960        Preoperative Diagnosis: Bile duct stricture [K83.1]   Procedure(s):  ENDOSCOPIC RETROGRADE CHOLANGIOPANCREATOGRAPHY, WITH biliary dilarion, stent exchange, stone removal   Postop Comments: No value filed.     Anesthesia Type: General       Disposition: Admission   Postop Pain Control: Uneventful            Sign Out: Well controlled pain   PONV: No   Neuro/Psych: Uneventful            Sign Out: Acceptable/Baseline neuro status   Airway/Respiratory: Uneventful            Sign Out: Acceptable/Baseline resp. status   CV/Hemodynamics: Uneventful            Sign Out: Acceptable CV status   Other NRE: NONE   DID A NON-ROUTINE EVENT OCCUR? No         Last Anesthesia Record Vitals:  CRNA VITALS  3/4/2020 0817 - 3/4/2020 0917      3/4/2020             NIBP:  133/60    Ht Rate:  71    Resp Rate (observed):  (!) 1          Last PACU Vitals:  Vitals Value Taken Time   /71 3/4/2020 11:21 AM   Temp 36.1  C (96.9  F) 3/4/2020 11:21 AM   Pulse 62 3/4/2020 10:00 AM   Resp 12 3/4/2020 11:21 AM   SpO2 95 % 3/4/2020 11:21 AM   Temp src     NIBP 133/60 3/4/2020  8:46 AM   Pulse     SpO2 99 % 3/4/2020  8:45 AM   Resp     Temp     Ht Rate 71 3/4/2020  8:46 AM   Temp 2     Vitals shown include unvalidated device data.      Electronically Signed By: Sulma Moreno MD, 2020, 11:53 AM

## 2020-03-04 NOTE — PROGRESS NOTES
Nursing Focus: Admission  D: Arrived at 0230 from ED via cart. Patient accompanied by self. Admitted for fever. Only complaint is of joint pain in the knees.     I: Admission process began.  Patient oriented to room, enviroment, call light.  Md. notified of patients arrival on unit.     A: Vital signs stable, afebrile.  Patient stable at this time. Only complaint is of joint pain in the knees.     P: Implement plan of care when available. Continue to monitor patient. Nursing interventions as appropriate. Notify md with changes in pt status.

## 2020-03-04 NOTE — ED NOTES
[facility], [place]   ED Nurse to Floor Handoff     Nicci Pemberton is a 59 year old female who speaks English and lives with a spouse,  in a home  They arrived in the ED by car from home    ED Chief Complaint: Fever    ED Dx;   Final diagnoses:   None         Needed?: No    Allergies:   Allergies   Allergen Reactions     Food      Fruits with cores and pits  Apples     Sulfa Drugs Unknown     Swollen joints     Furosemide Rash   .  Past Medical Hx:   Past Medical History:   Diagnosis Date     Anemia      Cellulitis      Cirrhosis of liver (H) 6/18/2018     Heterozygous alpha 1-antitrypsin deficiency (H)      High hepatic iron concentration determined by biopsy of liver      Liver cirrhosis secondary to ANGULO (H)      Liver transplanted (H) 08/18/2019     Lymphedema      Osteoarthritis     knees     SBP (spontaneous bacterial peritonitis) (H) 8/2/2019 8/1/19 in [place]      Baseline Mental status: WDL  Current Mental Status changes: at basesline    Infection present or suspected this encounter: no  Sepsis suspected: Yes  Isolation type: No active isolations     Activity level - Baseline/Home:  Independent and Cane  Activity Level - Current:   Independent and Cane    Bariatric equipment needed?: No    In the ED these meds were given:   Medications   piperacillin-tazobactam (ZOSYN) 3.375 g vial to attach to  mL bag (3.375 g Intravenous New Bag 3/4/20 0142)   acetaminophen (TYLENOL) tablet 1,000 mg (1,000 mg Oral Given 3/4/20 0154)       Drips running?  Yes    Home pump  No     Current LDAs  Peripheral IV 03/03/20 Left (Active)   Site Assessment WDL 3/3/2020  9:50 PM   Number of days: 1       Labs results:   Labs Ordered and Resulted from Time of ED Arrival Up to the Time of Departure from the ED   CBC WITH PLATELETS DIFFERENTIAL - Abnormal; Notable for the following components:       Result Value    RBC Count 2.96 (*)     Hemoglobin 10.0 (*)     Hematocrit 30.7 (*)       (*)     MCH 33.8 (*)     Platelet Count 114 (*)     All other components within normal limits   BASIC METABOLIC PANEL - Abnormal; Notable for the following components:    Urea Nitrogen 33 (*)     Creatinine 1.21 (*)     GFR Estimate 49 (*)     GFR Estimate If Black 56 (*)     All other components within normal limits   LIPASE - Abnormal; Notable for the following components:    Lipase 22 (*)     All other components within normal limits   HEPATIC PANEL - Abnormal; Notable for the following components:    Bilirubin Direct 0.3 (*)     Protein Total 6.7 (*)     All other components within normal limits   UA MACROSCOPIC WITH REFLEX TO MICRO AND CULTURE   BLOOD CULTURE   BLOOD CULTURE   INFLUENZA A AND B AND RSV PCR   BLOOD CULTURE       Imaging Studies:   Recent Results (from the past 24 hour(s))   US Liver Transplant    Narrative    EXAMINATION: US LIVER TRANSPLANT, 3/3/2020 11:41 PM     COMPARISON: 2/1/2020    HISTORY: abdominal pain      TECHNIQUE:  Gray-scale, color Doppler and spectral flow analysis.    FINDINGS:   There is no ascites.    Liver: Postoperative changes of liver transplant.  The liver  demonstrates normal homogeneous echotexture. No evidence of a focal  hepatic mass.     Bile Ducts: Both the intra- and extrahepatic biliary system are of  normal caliber.  The common bile duct measures 11 mm in diameter with  stent in place.    Gallbladder: The gallbladder is surgically absent.    Kidneys:   Right kidney:  The right kidney demonstrates normal echotexture with  no evidence of a shadowing stone, focal mass or hydronephrosis.   11.6  cm in long axis dimension.  Left kidney:  The left kidney demonstrates normal echotexture with no  evidence of a shadowing stone, focal mass or hydronephrosis.   11.1 cm  in long axis dimension.    Pancreas: Visualized portions of the pancreas are normal in  appearance.    Spleen:  The spleen is within normal limits, measuring 13 cm.    Visualized portions of the aorta are  "unremarkable.    LIVER DOPPLER:  Splenic vein:  Patent continuous normal antegrade direction flow  towards the liver, 19 cm/sec.  Extrahepatic portal vein:  Patent continuous antegrade flow, 39  cm/sec.  Portal vein at anastomosis: Patent continuous antegrade flow, 29  cm/sec.  Intrahepatic portal vein:  Patent continuous antegrade flow, 36  cm/sec.  Right portal vein flow is antegrade, measuring 24 cm/sec.  Left portal vein flow is antegrade, measuring 12 cm/sec.    Inferior vena cava: patent with flow toward the heart throughout..  IVC above anastomosis:  103 cm/sec.  IVC at anastomosis:  89 cm/sec.  Intrahepatic IVC:  40 cm/sec.  Extrahepatic IVC:  24 cm/sec.    Right, mid, left hepatic veins: Patent with flow towards the inferior  vena cava.    Extrahepatic hepatic artery: Low resistance waveform with flow towards  the liver. 52 cm/sec with resistive index 0.55.  Right hepatic artery: 55 cm/sec with resistive index 0.51.  Left hepatic artery: 137 cm/sec with resistive index 0.73.      Impression    Impression: Patent Doppler evaluation of liver transplant without  apparent complication. No acute ultrasound findings to explain  patient's abdominal pain.       Recent vital signs:   /68   Pulse 78   Temp 98.5  F (36.9  C) (Oral)   Resp 16   Ht 1.588 m (5' 2.5\")   Wt 76.2 kg (168 lb)   SpO2 100%   BMI 30.24 kg/m      Keo Coma Scale Score: 15 (03/03/20 2142)       Cardiac Rhythm: Other  Pt needs tele? No  Skin/wound Issues: None    Code Status: Full Code    Pain control: fair    Nausea control: fair    Abnormal labs/tests/findings requiring intervention: see epic    Family present during ED course? No   Family Comments/Social Situation comments: N/A    Tasks needing completion: None    Ingris Cruz RN  [facility] --        [phone] [facility]      "

## 2020-03-04 NOTE — DISCHARGE SUMMARY
"  [facility], [place]  I evaluated the patient today.  I reviewed the discharge plan with the fellow, and agree with the final assessment and plan for discharge as noted in the discharge summary.  I personally spent  30 minutes or less  today on discharge activities for this patient.      Mandeep Alford MD, PAM  Professor of Surgery  [employer]  Surgical Director of Liver Transplant Program  Executive Medical Director of Pediatric Transplantation  Discharge Summary  Transplant Surgery    Date of Admission:  3/3/2020  Date of Discharge:  3/4/2020  5:18 PM  Discharging Provider: Mary Braxton PA-C/ Dr. Alford  Date of Service (when I saw the patient): 3/4/20    Discharge Diagnoses   Principal Problem:    Bile duct stricture  Active Problems:    Status post liver transplantation (H)    Immunosuppressed status (H)    Fever    Cholangitis      Procedure/Surgery Information   Procedure: Procedure(s):  ENDOSCOPIC RETROGRADE CHOLANGIOPANCREATOGRAPHY, WITH biliary dilarion, stent exchange, stone removal   Surgeon(s): Surgeon(s) and Role:     * Guru Rigoberto Canada MD - Primary       Non-operative procedures None performed     History of Present Illness   Nicci Pemberton is a 59 year old female w/ PMH for obesity, lymphedema and 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 She is now s/p  Donor Liver Transplant from 2019. She is scheduled to have an ERCP with stent replacement for bile duct stricture 3/4 with GI.      She presents to the Emergency department with reports of fevers at home measuring up to 100.7. She was told to come into the ED for evaluation prior to ERCP tomorrow. She reports feeling more tired and \"funky\" over the past couple days. She woke up from a nap yesterday and continued to feel worse, she took her temperature at that time and was " "slightly elevated. She denies recent nausea, vomiting, cough, cold-like symptoms, chest pain or shortness of breath. Has not noticed dyspnea with exertion or peripheral edema and overall feels like her leg swelling continues to improve.      In the emergency department, her temperature was 98.5, her hemodynamics were stable. She was started on IV Zosyn and a Liver ultrasound was done.     Hospital Course     Pt underwent ERCP as scheduled on 3/4/20 AM. It showed \"previously placed stent with slight upwards migration, which was removed. Stones/sludge removed from duct and the high grade anastomotic stricture was stented with 10mm x 4cm Wallflex stent intraductally across the stricture.\" Repeat ERCP to be scheduled in 10 weeks per GI. She had no additional fevers while in the hospital, and she was transitioned to oral cipro and will discharge with a 7 day course.    Transplant coordinator: Zina Dunham [phone]    Post-operative pain control: included oxycodone on discharge    Antibiotics prescribed at discharge: Ciprofloxacin, Duration: 7 days     Imaging study follow up needs:   Repeat ERCP in 10 weeks    Discharge Disposition   Discharged to home   Condition at discharge: Stable    Primary Care Physician   Wale Thurston    Physical Exam                      Vitals:    03/03/20 2142 03/04/20 0229   Weight: 76.2 kg (168 lb) 78.2 kg (172 lb 8 oz)     Vital Signs with Ranges     No intake/output data recorded.    Constitutional: Awake, alert, cooperative, no apparent distress, and appears stated age.  Eyes: Lids and lashes normal, pupils equal, round, extra ocular muscles intact, sclera clear, conjunctiva normal.  ENT: Normocephalic, without obvious abnormality, atraumatic  Respiratory: Nonlabored resps  Cardiovascular: RRR  GI: Soft, nontender to light palpation  Genitourinary:  Voiding  Skin: No bruising or bleeding, normal skin color, texture  Neurologic: Awake, alert, oriented to name, place and time.  "   Neuropsychiatric: Calm, normal eye contact, alert, normal affect, oriented to self, place, time and situation, memory for past and recent events intact and thought process normal.    Consultations This Hospital Stay   GI PANCREATICOBILIARY ADULT IP CONSULT    Time Spent on this Encounter   I have spent less than 30 minutes on this discharge.    Discharge Orders   Discharge Medications   Discharge Medication List as of 3/4/2020  2:48 PM      START taking these medications    Details   ciprofloxacin (CIPRO) 500 MG tablet Take 1 tablet (500 mg) by mouth every 12 hours, Disp-14 tablet, R-0, E-Prescribe      oxyCODONE (ROXICODONE) 5 MG tablet Take 1 tablet (5 mg) by mouth every 6 hours as needed, Disp-5 tablet, R-0, E-Prescribe         CONTINUE these medications which have NOT CHANGED    Details   aspirin (ASA) 81 MG tablet Take 1 tablet (81 mg) by mouth daily, Disp-90 tablet, R-3, E-Prescribe      Cholecalciferol (VITAMIN D-3) 25 MCG (1000 UT) CAPS Take 1,000 Units by mouth 2 times daily, Disp-60 capsule, R-1, E-Prescribe      famotidine (PEPCID) 20 MG tablet Take 1 tablet (20 mg) by mouth 2 times daily, Disp-60 tablet, R-3, E-Prescribe      levothyroxine (SYNTHROID/LEVOTHROID) 150 MCG tablet Take 1 tablet (150 mcg) by mouth daily, HistoricalManaged by PCP      cycloSPORINE modified (GENERIC EQUIVALENT) 100 MG capsule Take 1 capsule (100 mg) by mouth 2 times daily *150 mg AM and 175 mg PM, Disp-180 capsule, R-3, E-Prescribe      cycloSPORINE modified (GENERIC EQUIVALENT) 25 MG capsule Take 2 capsules (50 mg) by mouth every morning AND 3 capsules (75 mg) every evening. *total dose of 150 mg AM and 175 mg PM, Disp-120 capsule, R-3, E-Prescribe                Reason for your hospital stay    Patient was admitted prior to ERCP due to low grade fevers. She underwent ERCP on 3/4 which showed partially occluded stent, this was exchanged. She will discharge on oral antibiotics for a 7 day course and plan for repeat ERCP in 10  weeks.     Adult [facility] Follow-up and recommended labs and tests    Labs to be drawn on Thursday, March 5 and Monday, March 9: CBC, BMP, hepatic panel, lipase, mag and phos and cyclosporine level    Then may resume previous lab schedule: CBC, BMP, hepatic panel, mag and phos and cyclosporine level    Resume follow unit(s) with hepatology as scheduled    ERCP for stent exchange to be completed in 10 weeks.    Appointments on [place] and/or [facility] (with [facility] or [facility] provider or service). Call [phone] if you haven't heard regarding these appointments within 7 days of discharge.     Activity    Your activity upon discharge: activity as tolerated     When to contact your care team    Call your transplant coordinator at [phone] if you have any of the following: sustained temperature greater than 100.4 or isolated fever spike to 101.5, increased pain over graft  or difficulty obtaining or taking your transplant medications.    If it is outside of office hours, please call the hospital switchboard at [phone] and ask to have the transplant surgery fellow paged for urgent medical questions.     Diet    Follow this diet upon discharge: Regular diet

## 2020-03-04 NOTE — ANESTHESIA CARE TRANSFER NOTE
Patient: Nicci Pemberton    Procedure(s):  ENDOSCOPIC RETROGRADE CHOLANGIOPANCREATOGRAPHY, WITH biliary dilarion, stent exchange, stone removal    Diagnosis: Bile duct stricture [K83.1]  Diagnosis Additional Information: No value filed.    Anesthesia Type:   General     Note:  Airway :Nasal Cannula  Patient transferred to:PACU  Comments: Pt to recovery room.  Spontaneous respirations.   Report given to RN.  Handoff Report: Identifed the Patient, Identified the Reponsible Provider, Reviewed the pertinent medical history, Discussed the surgical course, Reviewed Intra-OP anesthesia mangement and issues during anesthesia, Set expectations for post-procedure period and Allowed opportunity for questions and acknowledgement of understanding      Vitals: (Last set prior to Anesthesia Care Transfer)    CRNA VITALS  3/4/2020 0816 - 3/4/2020 0846      3/4/2020             Resp Rate (observed):  (!) 1                Electronically Signed By: SANTINO Ott CRNA  March 4, 2020  8:46 AM

## 2020-03-04 NOTE — PLAN OF CARE
Afebrile, VSS. Capnography in place. Returned from ERCP w/ stent exchange around 1030. C/o mild abdominal pain, received oxycodone x1 w/ relief. Denies nausea. Pt currently on clear liquid diet, had broth and fruit ice this afternoon. Adequate UOP this shift, small BM this morning per pt. Up in room w/ SBA. Possible discharge later today or tomorrow. Continue to monitor.

## 2020-03-04 NOTE — PLAN OF CARE
Afebrile, VSS on RA. Denies pain/nausea. Only complaint is of aching/discomfort r/t her knee joints. Pillows placed under pt knees while sleeping for comfort. Pt up independently with cane. Pre-procedure shower completed. Has been NPO since midnight. Pt transported to OR at 0615 for ERCP.

## 2020-03-04 NOTE — ED PROVIDER NOTES
"    [place] EMERGENCY DEPARTMENT ([facility])  3/03/20    History     Chief Complaint   Patient presents with     Fever     The history is provided by the patient and medical records.     Nicci Pemberton is a 59 year old female with a past medical history significant for cirrhosis 2/2 combination of nonalcoholic fatty liver disease s/p  donor liver transplant (2019), vertigo, HTN, hypothyroidism and heterozygous alpha-1 antitrypsin deficiency who presents here to the Emergency Department due to fever. Patient reports that she last had an ERCP on 2/10/2020 with a stent placement and is scheduled for another tomorrow. Reports that starting yesterday she felt like \"something\" was going on in her left ear. States that later in the day today she began to feel \"funky\" and began having chills and a headache. States that her skin feels \"sensitive\". Reports that she decided to take her temperature due to her symptoms and her scheduled ERCP tomorrow morning and it was found to be 100.7 F. Reports that she called the on call GI line and was told to come into the ED to be evaluated. Patients temperature here in triage is 98.3 F and she denies taking anything for her symptoms. Has noted ongoing abdominal discomfort over the past few days but believes this is due to associated pork she ate earlier in the week. Endorses associated on/off nausea. Reports that she had a BM here in the ED prior to being evaluated and notes that this helped the discomfort in her abdomen. Describes the stool as small and harder. Denies vomiting or diarrhea. Denies cough, congestion, sore throat or myalgias. Patient denies recent travel or sick contacts. Did receive the flu shot this year. Denies recent antibiotics. Was recently take off her Dapsone for her transplant 1 month ago.    I have reviewed the Medications, Allergies, Past Medical and Surgical History, and Social History in the [employer] system.    Past Medical History: "   Diagnosis Date     Anemia      Cellulitis      Cirrhosis of liver (H) 2018     Heterozygous alpha 1-antitrypsin deficiency (H)      High hepatic iron concentration determined by biopsy of liver      Liver cirrhosis secondary to ANGULO (H)      Liver transplanted (H) 2019     Lymphedema      Osteoarthritis     knees     SBP (spontaneous bacterial peritonitis) (H) 2019 in [place]       Past Surgical History:   Procedure Laterality Date     BENCH LIVER N/A 2019    Procedure: BACKBENCH PREPARATION, LIVER;  Surgeon: Mandeep Alford MD;  Location: [facility] OR     COLONOSCOPY N/A 2018    Procedure: COLONOSCOPY;  colonoscopy;  Surgeon: Hugo Patel MD;  Location: [facility] OR     ENDOSCOPIC RETROGRADE CHOLANGIOPANCREATOGRAM N/A 2/10/2020    Procedure: ENDOSCOPIC RETROGRADE CHOLANGIOPANCREATOGRAPHY with spinchterotomy;  Surgeon: Guru Rigoberto Canada MD;  Location: [facility] OR     ESOPHAGOSCOPY, GASTROSCOPY, DUODENOSCOPY (EGD), COMBINED N/A 10/31/2019    Procedure: Esophagogastroduodenoscopy, With Biopsy;  Surgeon: Nerissa Aranda MD;  Location: [facility] GI     IR FEEDING TUBE PLACEMENT W MOHAN/MD  2019     IR FLUORO 0-1 HOUR  2019     IR LUMBAR PUNCTURE  2019     TRANSPLANT LIVER RECIPIENT  DONOR N/A 2019    Procedure: TRANSPLANT, LIVER, RECIPIENT,  DONOR;  Surgeon: Mandeep Alford MD;  Location: [facility] OR       Family History   Problem Relation Age of Onset     Cirrhosis No family hx of      Liver Cancer No family hx of        Social History     Tobacco Use     Smoking status: Former Smoker     Packs/day: 0.00     Last attempt to quit:      Years since quittin.1     Smokeless tobacco: Never Used   Substance Use Topics     Alcohol use: No       No current facility-administered medications for this encounter.      Current Outpatient Medications   Medication     aspirin (ASA) 81 MG tablet     Cholecalciferol (VITAMIN D-3) 25 MCG (1000 UT)  "CAPS     cycloSPORINE modified (GENERIC EQUIVALENT) 100 MG capsule     cycloSPORINE modified (GENERIC EQUIVALENT) 25 MG capsule     famotidine (PEPCID) 20 MG tablet     levothyroxine (SYNTHROID/LEVOTHROID) 150 MCG tablet        Allergies   Allergen Reactions     Food      Fruits with cores and pits  Apples     Sulfa Drugs Unknown     Swollen joints     Furosemide Rash         Review of Systems   Constitutional: Positive for chills and fever.   HENT: Negative for congestion and sore throat.    Respiratory: Negative for cough.    Gastrointestinal: Positive for abdominal pain and nausea. Negative for diarrhea and vomiting.   Musculoskeletal: Negative for myalgias.   Neurological: Positive for headaches.   All other systems reviewed and are negative.      Physical Exam   BP: 132/80  Heart Rate: 82  Temp: 98.3  F (36.8  C)  Resp: 18  Height: 158.8 cm (5' 2.5\")  Weight: 76.2 kg (168 lb)  SpO2: 99 %      Physical Exam  Vitals signs and nursing note reviewed.   Constitutional:       General: She is not in acute distress.     Appearance: She is not diaphoretic.   HENT:      Head: Normocephalic and atraumatic.   Eyes:      Conjunctiva/sclera: Conjunctivae normal.      Pupils: Pupils are equal, round, and reactive to light.   Neck:      Musculoskeletal: Normal range of motion and neck supple.   Cardiovascular:      Rate and Rhythm: Normal rate and regular rhythm.   Pulmonary:      Effort: Pulmonary effort is normal. No respiratory distress.   Abdominal:      General: There is no distension.      Tenderness: There is no abdominal tenderness. There is no right CVA tenderness, left CVA tenderness or guarding.   Musculoskeletal: Normal range of motion.   Skin:     General: Skin is warm and dry.   Neurological:      Mental Status: She is alert and oriented to person, place, and time.   Psychiatric:         Behavior: Behavior normal.         Thought Content: Thought content normal.         ED Course   10:19 PM  The patient was seen " and examined by Otto Mercer MD in Room ED17.        Procedures                          Labs Ordered and Resulted from Time of ED Arrival Up to the Time of Departure from the ED   CBC WITH PLATELETS DIFFERENTIAL - Abnormal; Notable for the following components:       Result Value    RBC Count 2.96 (*)     Hemoglobin 10.0 (*)     Hematocrit 30.7 (*)      (*)     MCH 33.8 (*)     Platelet Count 114 (*)     All other components within normal limits   BASIC METABOLIC PANEL - Abnormal; Notable for the following components:    Urea Nitrogen 33 (*)     Creatinine 1.21 (*)     GFR Estimate 49 (*)     GFR Estimate If Black 56 (*)     All other components within normal limits   LIPASE - Abnormal; Notable for the following components:    Lipase 22 (*)     All other components within normal limits   HEPATIC PANEL - Abnormal; Notable for the following components:    Bilirubin Direct 0.3 (*)     Protein Total 6.7 (*)     All other components within normal limits   UA MACROSCOPIC WITH REFLEX TO MICRO AND CULTURE   BLOOD CULTURE   BLOOD CULTURE            Assessments & Plan (with Medical Decision Making)   59-year-old female with a history of liver transplant who presents with fever.  On my examination she is afebrile with normal vital signs and no evidence of any serious bacterial infection.  Lab evaluation was unremarkable.  Influenza was negative and urinalysis showed no signs of infection.  Ultrasound of liver transplant was also normal.  Discussed the case with transplant service who recommended admission with IV Zosyn.  We will plan on ERCP tomorrow for stent exchange.     I have reviewed the nursing notes.    I have reviewed the findings, diagnosis, plan and need for follow up with the patient.    New Prescriptions    No medications on file       Final diagnoses:   None     I, Leland Gonzalez, am serving as a trained medical scribe to document services personally performed by Otto Mercer MD, based on the provider's  statements to me.   I, Otto Mercer MD, was physically present and have reviewed and verified the accuracy of this note documented by Leland Gonzalez.    3/3/2020   [facility], [place], EMERGENCY DEPARTMENT     Otto Mercer MD  03/04/20 0138

## 2020-03-04 NOTE — CONSULTS
"    GASTROENTEROLOGY PROGRESS NOTE    Date of Admission: 3/3/2020  Reason for Admission: fever      ASSESSMENT:  58yo white female with PMH of obesity, lymphedema, and cirrhosis 2/2 ANGULO and A1AT who is s/p DDLT 8/2019 who recently underwent ERCP 2/10 for evaluation of elevated LFT< found to have high grade anastomotic stricture, s/p 10F plastic stent placement. Repeat ERCP was scheduled for today however the patient presented to the ED last night for new low grade fevers for which she got admitted. ERCP this morning showing previously placed stent with slight upwards migration, which was removed. Stones/sludge removed from duct and the high grade anastomotic stricture was stented with 10mm x 4cm Wallflex stent intraductally across the stricture.    RECOMMENDATIONS:  ADAT  Trend LFT, consider checking lipase if abdominal pain worsens  7 day course of antibiotics  Analgesia/antiemetics per primary team  Discussed with primary liver transplant team    GI follow up: ERCP in 10 weeks to re-evaluate stricture    The patient was discussed and plan agreed upon with GI staff, Dr Yo/Dr. Canada.      Melissa Bland PA-C  Advanced Endoscopy/Pancreaticobiliary GI Service  [facility] [place]  Pager *[phone]  Text Page  _______________________________________________________________      Subjective: Patient seen and examined at 1200. Patient reports that she started to feel very fatigued and tired yesterday afternoon and so she took her temperature and was 100.5. She called the GI fellow and was told to come into the ED. She underwent ERCP this morning. She is feeling \"okay\" after the procedure. Having some mild epigastric discomfort which she had after previous ERCP. No nausea or vomiting. Eating a popsicle as we speak and feels hungry for more solid food.    ROS:   4 pt ROS negative unless noted in subjective.     Objective:  Blood pressure 128/69, pulse 57, temperature 96.1  F (35.6  C), " "temperature source Oral, resp. rate 10, height 1.575 m (5' 2\"), weight 78.2 kg (172 lb 8 oz), SpO2 96 %, not currently breastfeeding.  Gen: Sitting in bed. Appears comfortable  HEENT: NCAT. Conjunctiva clear. Sclera anicteric  CV: RRR, Peripheral perfusion intact  Resp: normal work of breathing  Abd: Soft, minimal tenderness epigastric, ND, no guarding or rebound, +BS  Msk: no gross deformity  Skin:  no jaundice  Ext: warm, well perfused   Neuro: grossly normal  Mental status/Psych: A&O. Asks/answers questions appropriately       Date 03/04/20 0700 - 03/05/20 0659   Shift 5168-4696 3156-9259 1961-9141 24 Hour Total   INTAKE   P.O. 20   20   I.V. 550   550   Shift Total(mL/kg) 570(7.28)   570(7.28)   OUTPUT   Urine 0   0   Blood 0   0   Shift Total(mL/kg) 0(0)   0(0)   Weight (kg) 78.24 78.24 78.24 78.24         PROCEDURES:  ERCP 3/4 Dr. Canada  - Previous stent has migrated slightly upwards, but was removed easily   - Selective biliary cannulation was performed and pigmented stone/sludge material was removed   - High grade anastamotic stricture was identified again,which appeared similar to prior ERCP. A 10 mm by 4 cm fully-covered Wallflex stent was placed in a intraductal manner across the stricture for great radial force in dilating the stricture     LABS:  BMP  Recent Labs   Lab 03/04/20 0303 03/03/20 2145 03/03/20  0910    137 140   POTASSIUM 4.4 4.3 4.9   CHLORIDE 108 106 109   JACOB 9.2 9.5 10.0   CO2 27 28 26   BUN 30 33* 35*   CR 1.22* 1.21* 1.32*   GLC 96 85 85     CBC  Recent Labs   Lab 03/04/20 0303 03/03/20 2145 03/03/20  0910   WBC 7.0 7.0 6.4   RBC 3.01* 2.96* 2.90*   HGB 10.2* 10.0* 10.0*   HCT 30.5* 30.7* 30.1*   * 104* 104*   MCH 33.9* 33.8* 34.5*   MCHC 33.4 32.6 33.2   RDW 11.4 11.4 11.5   * 114* 123*     INR  Recent Labs   Lab 03/04/20  0303   INR 1.16*     LFTs  Recent Labs   Lab 03/04/20  0303 03/03/20  2145 03/03/20  0910   ALKPHOS 136 142 141   AST 15 16 19   ALT " 17 19 20   BILITOTAL 1.3 1.1 0.9   PROTTOTAL 6.6* 6.7* 6.8   ALBUMIN 3.4 3.5 3.5      PANC  Recent Labs   Lab 03/04/20  0303 03/03/20  2145   LIPASE 20* 22*         IMAGING:  EXAMINATION: US LIVER TRANSPLANT, 3/3/2020 11:41 PM      COMPARISON: 2/1/2020     HISTORY: abdominal pain       TECHNIQUE:  Gray-scale, color Doppler and spectral flow analysis.     FINDINGS:   There is no ascites.     Liver: Postoperative changes of liver transplant.  The liver  demonstrates normal homogeneous echotexture. No evidence of a focal  hepatic mass.      Bile Ducts: Both the intra- and extrahepatic biliary system are of  normal caliber.  The common bile duct measures 11 mm in diameter with  stent in place.     Gallbladder: The gallbladder is surgically absent.     Kidneys:   Right kidney:  The right kidney demonstrates normal echotexture with  no evidence of a shadowing stone, focal mass or hydronephrosis.   11.6  cm in long axis dimension.  Left kidney:  The left kidney demonstrates normal echotexture with no  evidence of a shadowing stone, focal mass or hydronephrosis.   11.1 cm  in long axis dimension.     Pancreas: Visualized portions of the pancreas are normal in  appearance.      Spleen:  The spleen is within normal limits, measuring 13 cm.     Visualized portions of the aorta are unremarkable.     LIVER DOPPLER:  Splenic vein:  Patent continuous normal antegrade direction flow  towards the liver, 19 cm/sec.  Extrahepatic portal vein:  Patent continuous antegrade flow, 39  cm/sec.  Portal vein at anastomosis: Patent continuous antegrade flow, 29  cm/sec.  Intrahepatic portal vein:  Patent continuous antegrade flow, 36  cm/sec.  Right portal vein flow is antegrade, measuring 24 cm/sec.  Left portal vein flow is antegrade, measuring 12 cm/sec.     Inferior vena cava: patent with flow toward the heart throughout..  IVC above anastomosis:  103 cm/sec.  IVC at anastomosis:  89 cm/sec.  Intrahepatic IVC:  40 cm/sec.  Extrahepatic  IVC:  24 cm/sec.     Right, mid, left hepatic veins: Patent with flow towards the inferior  vena cava.     Extrahepatic hepatic artery: Low resistance waveform with flow towards  the liver. 52 cm/sec with resistive index 0.55.  Right hepatic artery: 55 cm/sec with resistive index 0.51.  Left hepatic artery: 137 cm/sec with resistive index 0.73.                                                                      Impression: Patent Doppler evaluation of liver transplant without  apparent complication. No acute ultrasound findings to explain  patient's abdominal pain.

## 2020-03-04 NOTE — H&P
"  [facility], [place]      I did not see this patient but discussed with the housestaff and agree with the below note.  No billing for this note      Transplant Surgery  History and Physical    Nicci Pemberton  : 1960  MRN # [id number]    ADMIT DATE: 3/4/2020    PCP: Wale Thurston    CHIEF COMPLAINT: Fever    HPI: Nicci Pemberton is a 59 year old female w/ PMH for obesity, lymphedema and 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 She is now s/p  Donor Liver Transplant from 2019. She is scheduled to have an ERCP with stent replacement for bile duct stricture tomorrow with GI.     She presents to the Emergency department with reports of fevers at home measuring up to 100.7. She was told to come into the ED for evaluation prior to ERCP tomorrow. She reports feeling more tired and \"funky\" over the past couple days. She woke up from a nap yesterday and continued to feel worse, she took her temperature at that time and was slightly elevated. She denies recent nausea, vomiting, cough, cold-like symptoms, chest pain or shortness of breath. Has not noticed dyspnea with exertion or peripheral edema and overall feels like her leg swelling continues to improve.     In the emergency department, her temperature was 98.5, her hemodynamics were stable. She was started on IV Zosyn and a Liver ultrasound was done.     ROS:   Review of Symptoms was otherwise negative unless noted above in HPI.     PMH:  Past Medical History:   Diagnosis Date     Anemia      Cellulitis      Cirrhosis of liver (H) 2018     Heterozygous alpha 1-antitrypsin deficiency (H)      High hepatic iron concentration determined by biopsy of liver      Liver cirrhosis secondary to ANGULO (H)      Liver transplanted (H) 2019     Lymphedema      Osteoarthritis     knees     SBP (spontaneous bacterial peritonitis) (H) 2019 in [facility] "       PSH:  Past Surgical History:   Procedure Laterality Date     BENCH LIVER N/A 2019    Procedure: BACKBENCH PREPARATION, LIVER;  Surgeon: Mandeep Alford MD;  Location: [facility] OR     COLONOSCOPY N/A 2018    Procedure: COLONOSCOPY;  colonoscopy;  Surgeon: Huog Patel MD;  Location: [facility] OR     ENDOSCOPIC RETROGRADE CHOLANGIOPANCREATOGRAM N/A 2/10/2020    Procedure: ENDOSCOPIC RETROGRADE CHOLANGIOPANCREATOGRAPHY with spinchterotomy;  Surgeon: Guru Rigoberto Canada MD;  Location: [facility] OR     ESOPHAGOSCOPY, GASTROSCOPY, DUODENOSCOPY (EGD), COMBINED N/A 10/31/2019    Procedure: Esophagogastroduodenoscopy, With Biopsy;  Surgeon: Nerissa Aranda MD;  Location: [facility] GI     IR FEEDING TUBE PLACEMENT W MOHAN/MD  2019     IR FLUORO 0-1 HOUR  2019     IR LUMBAR PUNCTURE  2019     TRANSPLANT LIVER RECIPIENT  DONOR N/A 2019    Procedure: TRANSPLANT, LIVER, RECIPIENT,  DONOR;  Surgeon: Mandeep Alford MD;  Location: [facility] OR       MEDICATIONS:  Prior to Admission Medications   Prescriptions Last Dose Informant Patient Reported? Taking?   Cholecalciferol (VITAMIN D-3) 25 MCG (1000 UT) CAPS   No No   Sig: Take 1,000 Units by mouth 2 times daily   aspirin (ASA) 81 MG tablet   No No   Sig: Take 1 tablet (81 mg) by mouth daily   cycloSPORINE modified (GENERIC EQUIVALENT) 100 MG capsule   No No   Sig: Take 1 capsule (100 mg) by mouth 2 times daily *150 mg AM and 175 mg PM   cycloSPORINE modified (GENERIC EQUIVALENT) 25 MG capsule   No No   Sig: Take 2 capsules (50 mg) by mouth every morning AND 3 capsules (75 mg) every evening. *total dose of 150 mg AM and 175 mg PM   famotidine (PEPCID) 20 MG tablet   No No   Sig: Take 1 tablet (20 mg) by mouth 2 times daily   levothyroxine (SYNTHROID/LEVOTHROID) 150 MCG tablet   Yes No   Sig: Take 1 tablet (150 mcg) by mouth daily      Facility-Administered Medications: None        ALLERGIES:     Allergies   Allergen  "Reactions     Food      Fruits with cores and pits  Apples     Sulfa Drugs Unknown     Swollen joints     Furosemide Rash       FAMILY HISTORY:  Family History   Problem Relation Age of Onset     Cirrhosis No family hx of      Liver Cancer No family hx of        SOCIAL HISTORY:  Social History     Socioeconomic History     Marital status:      Spouse name: Not on file     Number of children: Not on file     Years of education: Not on file     Highest education level: Not on file   Occupational History     Not on file   Social Needs     Financial resource strain: Not on file     Food insecurity:     Worry: Not on file     Inability: Not on file     Transportation needs:     Medical: Not on file     Non-medical: Not on file   Tobacco Use     Smoking status: Former Smoker     Packs/day: 0.00     Last attempt to quit:      Years since quittin.1     Smokeless tobacco: Never Used   Substance and Sexual Activity     Alcohol use: No     Drug use: No     Sexual activity: Not on file   Lifestyle     Physical activity:     Days per week: Not on file     Minutes per session: Not on file     Stress: Not on file   Relationships     Social connections:     Talks on phone: Not on file     Gets together: Not on file     Attends Advent service: Not on file     Active member of club or organization: Not on file     Attends meetings of clubs or organizations: Not on file     Relationship status: Not on file     Intimate partner violence:     Fear of current or ex partner: Not on file     Emotionally abused: Not on file     Physically abused: Not on file     Forced sexual activity: Not on file   Other Topics Concern     Parent/sibling w/ CABG, MI or angioplasty before 65F 55M? Not Asked   Social History Narrative     Not on file       PHYSICAL EXAM:  Blood pressure 112/73, pulse 76, temperature 98.5  F (36.9  C), temperature source Oral, resp. rate 16, height 1.588 m (5' 2.5\"), weight 76.2 kg (168 lb), SpO2 99 %, not " currently breastfeeding.  GENERAL: Alert and Oriented x3, no acute distress, comfortable  HEENT: No scleral icterus, moist mucus membranes  NECK: Supple and without lymphadenopathy.  CV: RRR, S1S2 present without murmur or rubs   RESPIRATORY: Respirations regular, even, and unlabored. Lungs CTA bilaterally.   GI: Soft and non distended with normoactive bowel sounds present in all quadrants. Healing clamshell incision.   EXTREMITIES: No peripheral edema. 2+ bilateral pedal pulses.   NEUROLOGIC: Alert and orientated x 3. CN II-XII grossly intact. No focal deficits.   MUSCULOSKELETAL: No joint swelling or tenderness.   SKIN: No jaundice. No rashes or lesions.     LABS:  CBC:  Recent Labs   Lab Test 03/03/20  2145   WBC 7.0   RBC 2.96*   HGB 10.0*   HCT 30.7*   *   MCH 33.8*   MCHC 32.6   RDW 11.4   *       CMP:  Recent Labs   Lab Test 03/03/20  2145      POTASSIUM 4.3   CHLORIDE 106   JACOB 9.5   CO2 28   BUN 33*   CR 1.21*   GLC 85   AST 16   ALT 19   BILITOTAL 1.1   ALBUMIN 3.5   PROTTOTAL 6.7*   ALKPHOS 142       IMAGING:  Liver Ultrasound:  Impression: Patent Doppler evaluation of liver transplant without  apparent complication. No acute ultrasound findings to explain  patient's abdominal pain.      ASSESSMENT & PLAN:  Nicci Pemberton is a 59 year old F now s/p Liver Transplantation for cirrhosis 2/2 non-alcoholic fatty liver disease, iron overload and alpha-1 antitrypsin deficiency. She is scheduled for ERCP and stent replacement with GI in the AM. She was admitted for fever and infectious workup after having fevers at home. Started on antibiotics in the Emergency Department and continued on the floor.     Plan:   - Continue Antibiotics  - NPO   - NS @75mL/hr  - GI panc-bili consult  - PTA immunosuppression meds    Patient discussed with Dr. Moise, transplant fellow who will discuss with staff.       Joes Cool MD  General Surgery Resident

## 2020-03-05 ENCOUNTER — TELEPHONE (OUTPATIENT)
Dept: TRANSPLANT | Facility: CLINIC | Age: 60
End: 2020-03-05

## 2020-03-05 DIAGNOSIS — G89.18 POST PROCEDURE DISCOMFORT: Primary | ICD-10-CM

## 2020-03-05 NOTE — TELEPHONE ENCOUNTER
Patient is exhausted this morning and is hoping she can change lab draw to tomorrow based on discharge instruction. Nicci did not know until late last night when she was home that she needed labs drawn. Her and her  were not told on discharge that they needed it. Instructed Nicci to have labs drawn tomorrow. If patient has any new pains or symptoms or fevers to call back. Pt agreeable with plan.

## 2020-03-05 NOTE — PLAN OF CARE
Patient changed to observation status at 1554.  Patient aware and provided with Observation Status informational handout.    D: Orders for discharge and outpatient medications written.    I: Home medications and return to clinic schedule reviewed with patient. Discharge instructions and parameters for calling Health Care Provider reviewed. Patient left at 1718 accompanied by 7D NST to discharge pharmacy and then to front door, picked up by .     A: Patient/family verbalized understanding and was ready for discharge.  When to contact your care team.  Call your transplant coordinator at [phone] if you have any of the following: sustained temperature greater than 100.4 or isolated fever spike to 101.5, increased pain over graft  or difficulty obtaining or taking your transplant medications.     If it is outside of office hours, please call the hospital switchboard at [phone] and ask to have the transplant surgery fellow paged for urgent medical questions        P: Patient instructed to  medications in Pharmacy. Follow up as scheduled.  Labs to be drawn on Thursday, March 5 and Monday, March 9: CBC, BMP, hepatic panel, lipase, mag and phos and cyclosporine level.Call [phone] if you haven't heard regarding these appointments within 7 days of discharge.

## 2020-03-06 ENCOUNTER — PREP FOR PROCEDURE (OUTPATIENT)
Dept: GASTROENTEROLOGY | Facility: CLINIC | Age: 60
End: 2020-03-06

## 2020-03-06 ENCOUNTER — PATIENT OUTREACH (OUTPATIENT)
Dept: GASTROENTEROLOGY | Facility: CLINIC | Age: 60
End: 2020-03-06

## 2020-03-06 DIAGNOSIS — Z79.899 LONG TERM CURRENT USE OF IMMUNOSUPPRESSIVE DRUG: ICD-10-CM

## 2020-03-06 DIAGNOSIS — Z94.4 LIVER REPLACED BY TRANSPLANT (H): ICD-10-CM

## 2020-03-06 DIAGNOSIS — G89.18 POST PROCEDURE DISCOMFORT: ICD-10-CM

## 2020-03-06 DIAGNOSIS — Z94.4 STATUS POST LIVER TRANSPLANTATION (H): ICD-10-CM

## 2020-03-06 DIAGNOSIS — K83.1 BILIARY STRICTURE (H): Primary | ICD-10-CM

## 2020-03-06 LAB
ALBUMIN SERPL-MCNC: 3.4 G/DL (ref 3.4–5)
ALP SERPL-CCNC: 115 U/L (ref 40–150)
ALT SERPL W P-5'-P-CCNC: 20 U/L (ref 0–50)
ANION GAP SERPL CALCULATED.3IONS-SCNC: 4 MMOL/L (ref 3–14)
AST SERPL W P-5'-P-CCNC: 15 U/L (ref 0–45)
BILIRUB DIRECT SERPL-MCNC: 0.3 MG/DL (ref 0–0.2)
BILIRUB SERPL-MCNC: 0.9 MG/DL (ref 0.2–1.3)
BUN SERPL-MCNC: 30 MG/DL (ref 7–30)
CALCIUM SERPL-MCNC: 9.4 MG/DL (ref 8.5–10.1)
CHLORIDE SERPL-SCNC: 111 MMOL/L (ref 94–109)
CO2 SERPL-SCNC: 28 MMOL/L (ref 20–32)
CREAT SERPL-MCNC: 1.17 MG/DL (ref 0.52–1.04)
CYCLOSPORINE BLD LC/MS/MS-MCNC: 189 UG/L (ref 50–400)
ERYTHROCYTE [DISTWIDTH] IN BLOOD BY AUTOMATED COUNT: 11.3 % (ref 10–15)
GFR SERPL CREATININE-BSD FRML MDRD: 51 ML/MIN/{1.73_M2}
GLUCOSE SERPL-MCNC: 74 MG/DL (ref 70–99)
HCT VFR BLD AUTO: 31.3 % (ref 35–47)
HGB BLD-MCNC: 10.3 G/DL (ref 11.7–15.7)
LIPASE SERPL-CCNC: 79 U/L (ref 73–393)
MAGNESIUM SERPL-MCNC: 1.7 MG/DL (ref 1.6–2.3)
MCH RBC QN AUTO: 34 PG (ref 26.5–33)
MCHC RBC AUTO-ENTMCNC: 32.9 G/DL (ref 31.5–36.5)
MCV RBC AUTO: 103 FL (ref 78–100)
PHOSPHATE SERPL-MCNC: 3.3 MG/DL (ref 2.5–4.5)
PLATELET # BLD AUTO: 119 10E9/L (ref 150–450)
POTASSIUM SERPL-SCNC: 4.3 MMOL/L (ref 3.4–5.3)
PROT SERPL-MCNC: 6.8 G/DL (ref 6.8–8.8)
RBC # BLD AUTO: 3.03 10E12/L (ref 3.8–5.2)
SODIUM SERPL-SCNC: 143 MMOL/L (ref 133–144)
TME LAST DOSE: NORMAL H
WBC # BLD AUTO: 4.4 10E9/L (ref 4–11)

## 2020-03-06 PROCEDURE — 80048 BASIC METABOLIC PNL TOTAL CA: CPT | Performed by: PHYSICIAN ASSISTANT

## 2020-03-06 PROCEDURE — 83690 ASSAY OF LIPASE: CPT | Performed by: PHYSICIAN ASSISTANT

## 2020-03-06 PROCEDURE — 80076 HEPATIC FUNCTION PANEL: CPT | Performed by: PHYSICIAN ASSISTANT

## 2020-03-06 PROCEDURE — 80158 DRUG ASSAY CYCLOSPORINE: CPT | Performed by: TRANSPLANT SURGERY

## 2020-03-06 PROCEDURE — 83735 ASSAY OF MAGNESIUM: CPT | Performed by: PHYSICIAN ASSISTANT

## 2020-03-06 PROCEDURE — 36415 COLL VENOUS BLD VENIPUNCTURE: CPT | Performed by: PHYSICIAN ASSISTANT

## 2020-03-06 PROCEDURE — 85027 COMPLETE CBC AUTOMATED: CPT | Performed by: PHYSICIAN ASSISTANT

## 2020-03-06 PROCEDURE — 84100 ASSAY OF PHOSPHORUS: CPT | Performed by: PHYSICIAN ASSISTANT

## 2020-03-06 RX ORDER — CYCLOSPORINE 100 MG/1
100 CAPSULE, LIQUID FILLED ORAL 2 TIMES DAILY
Qty: 180 CAPSULE | Refills: 1 | Status: SHIPPED | OUTPATIENT
Start: 2020-03-06 | End: 2020-03-07

## 2020-03-06 NOTE — PROGRESS NOTES
Post ERCP (3-4-2020) with Dr. Canada: Follow-up    Post procedure recommendations:   Will recommend repeat ERCP in 10 weeks to re-evaluate  the stricture and remove the stent    - If patient develops fever, chills, jaundice or passes     dark urine or has worsening of LFTs before scheduled  ERCP, will recommend patient to call us immediately for consideration of an earlier urgent ERCP     Orders placed:   Please assist in scheduling:     Procedure/Imaging/Clinic: ERCP  Physician: Dr. Canada  Timing: 10 weeks  Procedure length:90 minutes  Anesthesia:general  Dx: biliary stricture  Location: [facility]    Patient states patient in clinic now, getting labs drawn, no fever. Sore throat, some abd pain. No concerning symptoms.     Clinic contact and scheduling numbers verified for future questions/concerns.    Jennifer Pedro RN Care Coordinator

## 2020-03-07 ENCOUNTER — TELEPHONE (OUTPATIENT)
Dept: TRANSPLANT | Facility: CLINIC | Age: 60
End: 2020-03-07

## 2020-03-07 DIAGNOSIS — Z94.4 STATUS POST LIVER TRANSPLANTATION (H): ICD-10-CM

## 2020-03-07 RX ORDER — CYCLOSPORINE 100 MG/1
100 CAPSULE, LIQUID FILLED ORAL EVERY 12 HOURS
Qty: 180 CAPSULE | Refills: 1 | Status: SHIPPED | OUTPATIENT
Start: 2020-03-07 | End: 2020-03-18

## 2020-03-07 RX ORDER — CYCLOSPORINE 25 MG/1
CAPSULE, LIQUID FILLED ORAL
Qty: 120 CAPSULE | Refills: 3 | Status: SHIPPED | OUTPATIENT
Start: 2020-03-07 | End: 2020-03-18

## 2020-03-07 NOTE — TELEPHONE ENCOUNTER
ISSUE:   Cyclosporine level 185 on 3/6, goal 150, dose 150 mg AM and 175 mg PM.    PLAN:   Please call patient and confirm this was an accurate 12-hour trough. Verify Cyclosporine dose 150 mg AM and 175 mg PM. Confirm no new medications or illness. Confirm no missed doses. If accurate trough and accurate dose, decrease Cyclosporine dose to 150 mg every 12 hours and repeat labs week of March 16th.    OUTCOME:   Spoke with patient, they confirm had longer than accurate trough level and current dose 150 mg AM and 175 mg PM. Patient confirmed dose change to 150 mg every 12 hours and to repeat labs week of March 16th. Orders sent to preferred pharmacy for dose change and lab for repeat labs. Patient voiced understanding of plan.

## 2020-03-09 PROBLEM — Z94.4 STATUS POST LIVER TRANSPLANTATION (H): Chronic | Status: ACTIVE | Noted: 2019-08-18

## 2020-03-09 PROBLEM — D84.9 IMMUNOSUPPRESSED STATUS (H): Chronic | Status: ACTIVE | Noted: 2019-09-19

## 2020-03-09 LAB
BACTERIA SPEC CULT: NO GROWTH
BACTERIA SPEC CULT: NO GROWTH
SPECIMEN SOURCE: NORMAL
SPECIMEN SOURCE: NORMAL

## 2020-03-10 PROBLEM — K83.1 BILIARY STRICTURE (H): Status: ACTIVE | Noted: 2020-03-10

## 2020-03-11 ENCOUNTER — HEALTH MAINTENANCE LETTER (OUTPATIENT)
Age: 60
End: 2020-03-11

## 2020-03-11 ENCOUNTER — TELEPHONE (OUTPATIENT)
Dept: TRANSPLANT | Facility: CLINIC | Age: 60
End: 2020-03-11

## 2020-03-11 NOTE — TELEPHONE ENCOUNTER
"Call from Nicci.  She had ERCP on 3/4 - was put on cipro post ERCP.  Since this procedure and starting cipro she has felt \" lightheadedness\" each morning.  She is using her walker now. Today the lightheadedness is a bit worse.  BP is normal. No changes in vision or weakness.  Heart rate is normal (60's).  She is going to skip last dose of cipro, will make an appt w/ her PCP on Friday and see him if symptoms aren't better.  Knows to come to ER if gets much worse.  Is drinking and eating a lot.  "

## 2020-03-12 ENCOUNTER — PATIENT OUTREACH (OUTPATIENT)
Dept: GASTROENTEROLOGY | Facility: CLINIC | Age: 60
End: 2020-03-12

## 2020-03-12 NOTE — PROGRESS NOTES
Returned patients voice mail message about dizziness, noting she talked to transplant office as well. Suspects dizziness is from cipro, stopped taking last night. Feels better today. Drank tons of water yesterday too. Still using walker but feeling better.     Jennifer Pedro, RN Care Coordinator

## 2020-03-17 DIAGNOSIS — Z94.4 LIVER REPLACED BY TRANSPLANT (H): ICD-10-CM

## 2020-03-17 DIAGNOSIS — Z79.899 LONG TERM CURRENT USE OF IMMUNOSUPPRESSIVE DRUG: ICD-10-CM

## 2020-03-17 LAB
ALBUMIN SERPL-MCNC: 3.4 G/DL (ref 3.4–5)
ALP SERPL-CCNC: 111 U/L (ref 40–150)
ALT SERPL W P-5'-P-CCNC: 21 U/L (ref 0–50)
ANION GAP SERPL CALCULATED.3IONS-SCNC: 4 MMOL/L (ref 3–14)
AST SERPL W P-5'-P-CCNC: 19 U/L (ref 0–45)
BILIRUB DIRECT SERPL-MCNC: 0.2 MG/DL (ref 0–0.2)
BILIRUB SERPL-MCNC: 0.8 MG/DL (ref 0.2–1.3)
BUN SERPL-MCNC: 40 MG/DL (ref 7–30)
CALCIUM SERPL-MCNC: 9.5 MG/DL (ref 8.5–10.1)
CHLORIDE SERPL-SCNC: 113 MMOL/L (ref 94–109)
CO2 SERPL-SCNC: 27 MMOL/L (ref 20–32)
CREAT SERPL-MCNC: 1 MG/DL (ref 0.52–1.04)
ERYTHROCYTE [DISTWIDTH] IN BLOOD BY AUTOMATED COUNT: 11.3 % (ref 10–15)
GFR SERPL CREATININE-BSD FRML MDRD: 61 ML/MIN/{1.73_M2}
GLUCOSE SERPL-MCNC: 79 MG/DL (ref 70–99)
HCT VFR BLD AUTO: 33.5 % (ref 35–47)
HGB BLD-MCNC: 11 G/DL (ref 11.7–15.7)
MAGNESIUM SERPL-MCNC: 1.8 MG/DL (ref 1.6–2.3)
MCH RBC QN AUTO: 34 PG (ref 26.5–33)
MCHC RBC AUTO-ENTMCNC: 32.8 G/DL (ref 31.5–36.5)
MCV RBC AUTO: 103 FL (ref 78–100)
PHOSPHATE SERPL-MCNC: 3.8 MG/DL (ref 2.5–4.5)
PLATELET # BLD AUTO: 143 10E9/L (ref 150–450)
POTASSIUM SERPL-SCNC: 5 MMOL/L (ref 3.4–5.3)
PROT SERPL-MCNC: 7 G/DL (ref 6.8–8.8)
RBC # BLD AUTO: 3.24 10E12/L (ref 3.8–5.2)
SODIUM SERPL-SCNC: 144 MMOL/L (ref 133–144)
WBC # BLD AUTO: 5.9 10E9/L (ref 4–11)

## 2020-03-17 PROCEDURE — 83735 ASSAY OF MAGNESIUM: CPT | Performed by: TRANSPLANT SURGERY

## 2020-03-17 PROCEDURE — 85027 COMPLETE CBC AUTOMATED: CPT | Performed by: TRANSPLANT SURGERY

## 2020-03-17 PROCEDURE — 80158 DRUG ASSAY CYCLOSPORINE: CPT | Performed by: TRANSPLANT SURGERY

## 2020-03-17 PROCEDURE — 80048 BASIC METABOLIC PNL TOTAL CA: CPT | Performed by: TRANSPLANT SURGERY

## 2020-03-17 PROCEDURE — 80076 HEPATIC FUNCTION PANEL: CPT | Performed by: TRANSPLANT SURGERY

## 2020-03-17 PROCEDURE — 36415 COLL VENOUS BLD VENIPUNCTURE: CPT | Performed by: TRANSPLANT SURGERY

## 2020-03-17 PROCEDURE — 84100 ASSAY OF PHOSPHORUS: CPT | Performed by: TRANSPLANT SURGERY

## 2020-03-18 ENCOUNTER — TELEPHONE (OUTPATIENT)
Dept: TRANSPLANT | Facility: CLINIC | Age: 60
End: 2020-03-18

## 2020-03-18 DIAGNOSIS — Z94.4 STATUS POST LIVER TRANSPLANTATION (H): ICD-10-CM

## 2020-03-18 LAB
CYCLOSPORINE BLD LC/MS/MS-MCNC: 209 UG/L (ref 50–400)
TME LAST DOSE: NORMAL H

## 2020-03-18 RX ORDER — CYCLOSPORINE 100 MG/1
100 CAPSULE, LIQUID FILLED ORAL EVERY 12 HOURS
Qty: 180 CAPSULE | Refills: 3 | Status: SHIPPED | OUTPATIENT
Start: 2020-03-18 | End: 2020-03-18

## 2020-03-18 RX ORDER — CYCLOSPORINE 25 MG/1
CAPSULE, LIQUID FILLED ORAL
Qty: 180 CAPSULE | Refills: 3 | Status: SHIPPED | OUTPATIENT
Start: 2020-03-18 | End: 2020-04-15

## 2020-03-18 RX ORDER — CYCLOSPORINE 25 MG/1
CAPSULE, LIQUID FILLED ORAL
Qty: 180 CAPSULE | Refills: 3 | Status: SHIPPED | OUTPATIENT
Start: 2020-03-18 | End: 2020-03-18

## 2020-03-18 RX ORDER — CYCLOSPORINE 100 MG/1
100 CAPSULE, LIQUID FILLED ORAL EVERY 12 HOURS
Qty: 180 CAPSULE | Refills: 3 | Status: SHIPPED | OUTPATIENT
Start: 2020-03-18 | End: 2020-04-15

## 2020-03-18 NOTE — TELEPHONE ENCOUNTER
ISSUE:   Cyclosporine level 209 on 3/17, goal 150, dose 150 mg every 12 hours.    PLAN:   Please call patient and confirm this was an accurate 12-hour trough. Verify Cyclosporine dose 150 mg every 12 hours. Confirm no new medications or illness. Confirm no missed doses. If accurate trough and accurate dose, decrease Cyclosporine dose to 150 mg AM and 125 mg PM and repeat labs as scheduled.     OUTCOME:   Spoke with patient, they confirm accurate trough level and current dose. Patient confirmed dose change to 150 mg AM and 125 mg PM and to repeat labs as previously scheduled and lab for repeat labs. Patient voiced understanding of plan.

## 2020-03-25 ENCOUNTER — TELEPHONE (OUTPATIENT)
Dept: TRANSPLANT | Facility: CLINIC | Age: 60
End: 2020-03-25

## 2020-03-25 NOTE — TELEPHONE ENCOUNTER
[place] her  have stayed in and not gone anywhere. She thinks she is struggling with allergies. Her  will  allergy medicine. No fever or coughing. Patient will call back with any changes or concerns.

## 2020-03-25 NOTE — TELEPHONE ENCOUNTER
Patient would like to know what OTC allergy meds are safe to take. Please call her on the number listed. [place]

## 2020-04-03 ENCOUNTER — TELEPHONE (OUTPATIENT)
Dept: TRANSPLANT | Facility: CLINIC | Age: 60
End: 2020-04-03

## 2020-04-03 NOTE — TELEPHONE ENCOUNTER
Spoke with patient. She is wanting to ensure that she prefers to have a video or telephone visit with DR leventhal and not come to clinic. Patient is doing ok.

## 2020-04-07 ENCOUNTER — MEDICAL CORRESPONDENCE (OUTPATIENT)
Dept: HEALTH INFORMATION MANAGEMENT | Facility: CLINIC | Age: 60
End: 2020-04-07

## 2020-04-08 DIAGNOSIS — E03.9 HYPOTHYROID: Primary | ICD-10-CM

## 2020-04-13 ENCOUNTER — TELEPHONE (OUTPATIENT)
Dept: LAB | Facility: CLINIC | Age: 60
End: 2020-04-13

## 2020-04-13 NOTE — TELEPHONE ENCOUNTER
Left message for patient to call in regards to 24 hour pre-appointment COVID screening. Patient has an appointment at [place] [facility] on 04/14/2020. Please ask infection screening questions and update appointment notes on 04/14/2020 lab schedule.

## 2020-04-14 DIAGNOSIS — Z79.899 LONG TERM CURRENT USE OF IMMUNOSUPPRESSIVE DRUG: ICD-10-CM

## 2020-04-14 DIAGNOSIS — Z94.4 LIVER REPLACED BY TRANSPLANT (H): ICD-10-CM

## 2020-04-14 DIAGNOSIS — E03.9 HYPOTHYROID: ICD-10-CM

## 2020-04-14 LAB
ALBUMIN SERPL-MCNC: 3.5 G/DL (ref 3.4–5)
ALBUMIN SERPL-MCNC: 3.6 G/DL (ref 3.4–5)
ALP SERPL-CCNC: 115 U/L (ref 40–150)
ALP SERPL-CCNC: 117 U/L (ref 40–150)
ALT SERPL W P-5'-P-CCNC: 22 U/L (ref 0–50)
ALT SERPL W P-5'-P-CCNC: 22 U/L (ref 0–50)
ANION GAP SERPL CALCULATED.3IONS-SCNC: 1 MMOL/L (ref 3–14)
AST SERPL W P-5'-P-CCNC: 20 U/L (ref 0–45)
AST SERPL W P-5'-P-CCNC: 21 U/L (ref 0–45)
BILIRUB DIRECT SERPL-MCNC: 0.3 MG/DL (ref 0–0.2)
BILIRUB DIRECT SERPL-MCNC: 0.3 MG/DL (ref 0–0.2)
BILIRUB SERPL-MCNC: 1.4 MG/DL (ref 0.2–1.3)
BILIRUB SERPL-MCNC: 1.4 MG/DL (ref 0.2–1.3)
BUN SERPL-MCNC: 45 MG/DL (ref 7–30)
CALCIUM SERPL-MCNC: 9.6 MG/DL (ref 8.5–10.1)
CHLORIDE SERPL-SCNC: 110 MMOL/L (ref 94–109)
CO2 SERPL-SCNC: 29 MMOL/L (ref 20–32)
CREAT SERPL-MCNC: 1.03 MG/DL (ref 0.52–1.04)
CYCLOSPORINE BLD LC/MS/MS-MCNC: 200 UG/L (ref 50–400)
ERYTHROCYTE [DISTWIDTH] IN BLOOD BY AUTOMATED COUNT: 11.5 % (ref 10–15)
GFR SERPL CREATININE-BSD FRML MDRD: 59 ML/MIN/{1.73_M2}
GLUCOSE SERPL-MCNC: 83 MG/DL (ref 70–99)
HCT VFR BLD AUTO: 34.3 % (ref 35–47)
HGB BLD-MCNC: 11.6 G/DL (ref 11.7–15.7)
MAGNESIUM SERPL-MCNC: 1.7 MG/DL (ref 1.6–2.3)
MCH RBC QN AUTO: 32.5 PG (ref 26.5–33)
MCHC RBC AUTO-ENTMCNC: 33.8 G/DL (ref 31.5–36.5)
MCV RBC AUTO: 96 FL (ref 78–100)
PHOSPHATE SERPL-MCNC: 4.4 MG/DL (ref 2.5–4.5)
PLATELET # BLD AUTO: 129 10E9/L (ref 150–450)
POTASSIUM SERPL-SCNC: 5.4 MMOL/L (ref 3.4–5.3)
PROT SERPL-MCNC: 7.1 G/DL (ref 6.8–8.8)
PROT SERPL-MCNC: 7.1 G/DL (ref 6.8–8.8)
RBC # BLD AUTO: 3.57 10E12/L (ref 3.8–5.2)
SODIUM SERPL-SCNC: 140 MMOL/L (ref 133–144)
TME LAST DOSE: NORMAL H
TSH SERPL DL<=0.005 MIU/L-ACNC: 0.14 MU/L (ref 0.4–4)
WBC # BLD AUTO: 5.5 10E9/L (ref 4–11)

## 2020-04-14 PROCEDURE — 80048 BASIC METABOLIC PNL TOTAL CA: CPT | Performed by: TRANSPLANT SURGERY

## 2020-04-14 PROCEDURE — 84443 ASSAY THYROID STIM HORMONE: CPT | Performed by: FAMILY MEDICINE

## 2020-04-14 PROCEDURE — 36415 COLL VENOUS BLD VENIPUNCTURE: CPT | Performed by: FAMILY MEDICINE

## 2020-04-14 PROCEDURE — 85027 COMPLETE CBC AUTOMATED: CPT | Performed by: TRANSPLANT SURGERY

## 2020-04-14 PROCEDURE — 80158 DRUG ASSAY CYCLOSPORINE: CPT | Performed by: TRANSPLANT SURGERY

## 2020-04-14 PROCEDURE — 80076 HEPATIC FUNCTION PANEL: CPT | Performed by: FAMILY MEDICINE

## 2020-04-14 PROCEDURE — 84100 ASSAY OF PHOSPHORUS: CPT | Performed by: TRANSPLANT SURGERY

## 2020-04-14 PROCEDURE — 83735 ASSAY OF MAGNESIUM: CPT | Performed by: TRANSPLANT SURGERY

## 2020-04-15 ENCOUNTER — TELEPHONE (OUTPATIENT)
Dept: TRANSPLANT | Facility: CLINIC | Age: 60
End: 2020-04-15

## 2020-04-15 DIAGNOSIS — Z94.4 STATUS POST LIVER TRANSPLANTATION (H): ICD-10-CM

## 2020-04-15 RX ORDER — CYCLOSPORINE 100 MG/1
100 CAPSULE, LIQUID FILLED ORAL EVERY 12 HOURS
Qty: 180 CAPSULE | Refills: 3 | Status: SHIPPED | OUTPATIENT
Start: 2020-04-15 | End: 2020-06-15

## 2020-04-15 RX ORDER — CYCLOSPORINE 25 MG/1
CAPSULE, LIQUID FILLED ORAL
Qty: 180 CAPSULE | Refills: 3 | Status: SHIPPED | OUTPATIENT
Start: 2020-04-15 | End: 2020-06-15

## 2020-04-15 NOTE — TELEPHONE ENCOUNTER
ISSUE:   Cyclosporine level 200 on 04/14/2020, goal 150, dose 150 mg Am and 125 mg PM.    PLAN:   Please call patient and confirm this was an accurate 12-hour trough. Verify Cyclosporine dose. Confirm no new medications or illness. Confirm no missed doses. If accurate trough and accurate dose, decrease Cyclosporine dose to 125 mg every 12 hours and repeat labs in 3 weeks.     OUTCOME:   Attempted to reach patient. Patient's phone rings and does not go to voicemail. Left my chart message for patient to decrease and to call with any questions. and to repeat labs in3 weeks. Orders sent to preferred pharmacy for dose change and lab for repeat labs. Patient voiced understanding of plan.

## 2020-04-16 ENCOUNTER — VIRTUAL VISIT (OUTPATIENT)
Dept: GASTROENTEROLOGY | Facility: CLINIC | Age: 60
End: 2020-04-16
Attending: INTERNAL MEDICINE
Payer: COMMERCIAL

## 2020-04-16 DIAGNOSIS — K83.1 BILE DUCT STRICTURE (H): Primary | ICD-10-CM

## 2020-04-16 DIAGNOSIS — D84.9 IMMUNOSUPPRESSED STATUS (H): Chronic | ICD-10-CM

## 2020-04-16 DIAGNOSIS — Z48.298 CARE AFTER ORGAN TRANSPLANT: ICD-10-CM

## 2020-04-16 DIAGNOSIS — Z94.4 S/P LIVER TRANSPLANT (H): ICD-10-CM

## 2020-04-16 PROBLEM — R09.02 HYPOXIA: Status: RESOLVED | Noted: 2019-10-06 | Resolved: 2020-04-16

## 2020-04-16 PROBLEM — E87.5 HYPERKALEMIA: Status: RESOLVED | Noted: 2019-09-19 | Resolved: 2020-04-16

## 2020-04-16 PROBLEM — R04.0 EPISTAXIS: Status: RESOLVED | Noted: 2019-09-19 | Resolved: 2020-04-16

## 2020-04-16 PROBLEM — N17.9 AKI (ACUTE KIDNEY INJURY) (H): Status: RESOLVED | Noted: 2019-09-19 | Resolved: 2020-04-16

## 2020-04-16 PROBLEM — E86.0 DEHYDRATION: Status: RESOLVED | Noted: 2019-10-14 | Resolved: 2020-04-16

## 2020-04-16 PROBLEM — N17.9 ACUTE KIDNEY FAILURE, UNSPECIFIED (H): Status: RESOLVED | Noted: 2019-08-30 | Resolved: 2020-04-16

## 2020-04-16 PROBLEM — R50.9 FEVER: Status: RESOLVED | Noted: 2020-03-04 | Resolved: 2020-04-16

## 2020-04-16 PROBLEM — D62 ANEMIA DUE TO BLOOD LOSS, ACUTE: Status: RESOLVED | Noted: 2019-09-19 | Resolved: 2020-04-16

## 2020-04-16 PROBLEM — E43 SEVERE MALNUTRITION (H): Status: RESOLVED | Noted: 2019-09-20 | Resolved: 2020-04-16

## 2020-04-16 PROBLEM — G93.41 METABOLIC ENCEPHALOPATHY: Status: RESOLVED | Noted: 2019-09-19 | Resolved: 2020-04-16

## 2020-04-16 PROBLEM — K74.60 CIRRHOSIS OF LIVER (H): Status: RESOLVED | Noted: 2018-06-18 | Resolved: 2020-04-16

## 2020-04-16 PROBLEM — K65.2 SBP (SPONTANEOUS BACTERIAL PERITONITIS) (H): Status: RESOLVED | Noted: 2019-08-02 | Resolved: 2020-04-16

## 2020-04-16 NOTE — PROGRESS NOTES
"Nicci Pemberton is a 59 year old female who is being evaluated via a billable telephone visit.      The patient has been notified of following:     \"This telephone visit will be conducted via a call between you and your physician/provider. We have found that certain health care needs can be provided without the need for a physical exam.  This service lets us provide the care you need with a short phone conversation.  If a prescription is necessary we can send it directly to your pharmacy.  If lab work is needed we can place an order for that and you can then stop by our lab to have the test done at a later time.    Telephone visits are billed at different rates depending on your insurance coverage. During this emergency period, for some insurers they may be billed the same as an in-person visit.  Please reach out to your insurance provider with any questions.    If during the course of the call the physician/provider feels a telephone visit is not appropriate, you will not be charged for this service.\"    Patient has given verbal consent for Telephone visit?  Yes    How would you like to obtain your AVS? Shashank    Additional provider notes:  Nicci Pemberton is a 59 year old female with past medical history of obesity, lymphedema, and cirrhosis secondary to combination of nonalcoholic fatty liver disease, iron overload, and alpha-1 antitrypsin MZ phenotype who is s/p  donor liver transplant on 19 and presents to re-establish care with hepatology.      Her post operative course was complicated by delerium, and on 2019 she was transitioned to cyclosporin from tacrolimus.  She had persistent nausea related to mycophenolate - which was present with all formulations. She was admitted to acute inpatient rehab on  thru 10/1/2019 for debility.  She was readmitted on 10/4 with acute onset vertigo with nausea and emesis - self limited    Her MMF was discontinued around month 3 post transplant with " resolution of symptoms.     She developed abnormal liver tests in January and these worsened in early February, and raise concern for biliary obstruction.  She underwent ERCP on February 10 which demonstrated a high-grade stricture at the area of the biliary anastomosis.  One biliary stent was placed at that time.  She ended up re-presenting to the emergency department with right upper quadrant pain feeling poorly on March 4, and underwent repeat ERCP at that time which demonstrated blocked stent that had migrated, stones and sludge developed within the biliary duct, and after these were removed there was still presence of a high-grade stricture.  Biliary stent was replaced.    Since that time she is been feeling pretty well.  She notes that intermittently she will get mid back pain or mid right-sided abdominal pain that waxes and wanes daily.  Notes that this pain gets up to a 5 out of 10 in nature.  Not overtly associated with eating or bowel movements.  It is not associated with nausea or emesis, and she has not been having any fevers.  She is already scheduled for repeat ERCP and assessment in early May 2020.    She continues on cyclosporine for immunosuppression secondary to concerns of delirium on tacrolimus around the time of transplant.  Her current goal trough level is 150        Surgical Course  - OLT date: 8/18/2019   - etiology: ANGULO/alpha 1  - donor: type DBD  - Induction IS: basiliximab  - CMV status D+/R+  - operation: Surgical technique caval replacement, biliary anastomosis: duct to duct  - CiT 335min, WiT 35min  - Episodes of Rejection: none  - Biliary complications:  Severe anastomotic stricture noted February 2020, repeat stenting March 2020  - Other complications of immediate post-operative course: delerium with d/c of tacrolimus and start of cyclosporin    Medical history, surgical history, social history, family history, and medications all reviewed and updated    Review of systems was  otherwise negative more specifically commented upon in the HPI    Labs: Reviewed  Procedures: Reviewed    Assessment and Plan:     S/P Liver Transplantation:   - 8/18/2019 with DBD donor for ANGULO/A1AT/iron overload  - Bi-caval with duct to duct  - Excellent allograft function at this time  -Postoperative course complicated by anastomotic biliary stricture and is currently being followed by the advanced endoscopy team  - Post- transplant labs per routine     Immunosuppression:   - Was transitioned to cyclosporin from tacrolimus for concerns of delerium in the immediate post transplant period.  - Has been maintained with goal trough ~150.  She has been running higher than this, the dose of her cyclosporine was decreased on 4/15/2020  - MMF discontinued with resolution of significant upper GI symptoms  - Will plan to check immunosuppression trough levels per protocol to assess for adequate target dosing and will assess CBC and kidney function for toxicity of medications     Infectious Prophylaxis:   - She has completed Imaxio     Kidney [facility]:   - She has preserved kidney function at this time     Nutrition/Weight:    It is very common for patients to put on significant weight after liver transplantation, as they become conditioned to eating as much as possible to maintain a healthy weight pre-transplant.  There is a very significant risk of developing fatty liver and non-alcoholic steatohepatitis in post-transplant patients, even in those who were not transplanted for ANGULO cirrhosis.  - Please work on eating a diet that is rice in fresh fruits and vegetables, moderate amounts of lean protein and dairy, and modest amounts of carbohydrates and fats.  - An exercise plan/regimen is an essential part of remaining healthy in the post-transplant setting and we recommend 30-35 minutes of exercise at least 4 days a week     Routine Health Care:  - You need to establish and maintain care with a primary care physician to  manage: hypertension, high cholesterol, abnormal blood sugars - as these are all potential complications of your health after liver transplantation and will need a local physician to manage these issues.  - All patients with liver diseaes (even post transplant) are at an increased risk for osteoporosis.  We strongly recommend screening for Vitamin D deficiency at least twice yearly with aggressive supplementation/replacement as indicated.    - We also recommend a screening DEXA scan to evaluate for osteoporosis.  If present, should treat with calcium, Vitamin D supplementation, and recommend consideration of bisphosphonate therapy.  Also recommend follow up DEXA scans to evaluate for improvement of bone density on therapy.  - Recommend yearly skin exams with dermatology, and if skin cancers are found, recommend twice yearly exams  - Recommend you stay up to date for cancer screening: mammograms/PAP for women and prostate cancer screening for men, and colon cancer screening for all.  - Practice good hygiene with washing of hands and maintaining a clean living space as this will decrease your chances of developing an infection in the post-transplantation setting  - Eat a health diet: rich in fresh fruits and vegetables, lean proteins, and minimize carbohydrate and fat intake.      Phone call duration: 25 minutes    Thomas M. Leventhal, M.D.   of Medicine  Advanced & Transplant Hepatology  [facility]

## 2020-04-16 NOTE — LETTER
"2020       RE: Nicci Pemberton  [address] [facility] [address]     Dear Colleague,    Thank you for referring your patient, Nicci Pemberton, to the [facility] HEPATOLOGY at [facility]. Please see a copy of my visit note below.    Nicci Pemberton is a 59 year old female who is being evaluated via a billable telephone visit.      The patient has been notified of following:     \"This telephone visit will be conducted via a call between you and your physician/provider. We have found that certain health care needs can be provided without the need for a physical exam.  This service lets us provide the care you need with a short phone conversation.  If a prescription is necessary we can send it directly to your pharmacy.  If lab work is needed we can place an order for that and you can then stop by our lab to have the test done at a later time.    Telephone visits are billed at different rates depending on your insurance coverage. During this emergency period, for some insurers they may be billed the same as an in-person visit.  Please reach out to your insurance provider with any questions.    If during the course of the call the physician/provider feels a telephone visit is not appropriate, you will not be charged for this service.\"    Patient has given verbal consent for Telephone visit?  Yes    How would you like to obtain your AVS? 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      Her post operative course was complicated by delerium, and on 2019 she was transitioned to cyclosporin from tacrolimus.  She had persistent nausea related to mycophenolate - " which was present with all formulations. She was admitted to acute inpatient rehab on 9/23 thru 10/1/2019 for debility.  She was readmitted on 10/4 with acute onset vertigo with nausea and emesis - self limited    Her MMF was discontinued around month 3 post transplant with resolution of symptoms.     She developed abnormal liver tests in January and these worsened in early February, and raise concern for biliary obstruction.  She underwent ERCP on February 10 which demonstrated a high-grade stricture at the area of the biliary anastomosis.  One biliary stent was placed at that time.  She ended up re-presenting to the emergency department with right upper quadrant pain feeling poorly on March 4, and underwent repeat ERCP at that time which demonstrated blocked stent that had migrated, stones and sludge developed within the biliary duct, and after these were removed there was still presence of a high-grade stricture.  Biliary stent was replaced.    Since that time she is been feeling pretty well.  She notes that intermittently she will get mid back pain or mid right-sided abdominal pain that waxes and wanes daily.  Notes that this pain gets up to a 5 out of 10 in nature.  Not overtly associated with eating or bowel movements.  It is not associated with nausea or emesis, and she has not been having any fevers.  She is already scheduled for repeat ERCP and assessment in early May 2020.    She continues on cyclosporine for immunosuppression secondary to concerns of delirium on tacrolimus around the time of transplant.  Her current goal trough level is 150        Surgical Course  - OLT date: 8/18/2019   - etiology: ANGULO/alpha 1  - donor: type DBD  - Induction IS: basiliximab  - CMV status D+/R+  - operation: Surgical technique caval replacement, biliary anastomosis: duct to duct  - CiT 335min, WiT 35min  - Episodes of Rejection: none  - Biliary complications:  Severe anastomotic stricture noted February 2020, repeat  stenting March 2020  - Other complications of immediate post-operative course: delerium with d/c of tacrolimus and start of cyclosporin    Medical history, surgical history, social history, family history, and medications all reviewed and updated    Review of systems was otherwise negative more specifically commented upon in the HPI    Labs: Reviewed  Procedures: Reviewed    Assessment and Plan:     S/P Liver Transplantation:   - 8/18/2019 with DBD donor for ANGULO/A1AT/iron overload  - Bi-caval with duct to duct  - Excellent allograft function at this time  -Postoperative course complicated by anastomotic biliary stricture and is currently being followed by the advanced endoscopy team  - Post- transplant labs per routine     Immunosuppression:   - Was transitioned to cyclosporin from tacrolimus for concerns of delerium in the immediate post transplant period.  - Has been maintained with goal trough ~150.  She has been running higher than this, the dose of her cyclosporine was decreased on 4/15/2020  - MMF discontinued with resolution of significant upper GI symptoms  - Will plan to check immunosuppression trough levels per protocol to assess for adequate target dosing and will assess CBC and kidney function for toxicity of medications     Infectious Prophylaxis:   - She has completed [employer]:   - She has preserved kidney function at this time     Nutrition/Weight:    It is very common for patients to put on significant weight after liver transplantation, as they become conditioned to eating as much as possible to maintain a healthy weight pre-transplant.  There is a very significant risk of developing fatty liver and non-alcoholic steatohepatitis in post-transplant patients, even in those who were not transplanted for ANGULO cirrhosis.  - Please work on eating a diet that is rice in fresh fruits and vegetables, moderate amounts of lean protein and dairy, and modest amounts of carbohydrates and  fats.  - An exercise plan/regimen is an essential part of remaining healthy in the post-transplant setting and we recommend 30-35 minutes of exercise at least 4 days a week     Routine Health Care:  - You need to establish and maintain care with a primary care physician to manage: hypertension, high cholesterol, abnormal blood sugars - as these are all potential complications of your health after liver transplantation and will need a local physician to manage these issues.  - All patients with liver diseaes (even post transplant) are at an increased risk for osteoporosis.  We strongly recommend screening for Vitamin D deficiency at least twice yearly with aggressive supplementation/replacement as indicated.    - We also recommend a screening DEXA scan to evaluate for osteoporosis.  If present, should treat with calcium, Vitamin D supplementation, and recommend consideration of bisphosphonate therapy.  Also recommend follow up DEXA scans to evaluate for improvement of bone density on therapy.  - Recommend yearly skin exams with dermatology, and if skin cancers are found, recommend twice yearly exams  - Recommend you stay up to date for cancer screening: mammograms/PAP for women and prostate cancer screening for men, and colon cancer screening for all.  - Practice good hygiene with washing of hands and maintaining a clean living space as this will decrease your chances of developing an infection in the post-transplantation setting  - Eat a health diet: rich in fresh fruits and vegetables, lean proteins, and minimize carbohydrate and fat intake.      Phone call duration: 25 minutes    Thomas M. Leventhal, M.D.   of Medicine  Advanced & Transplant Hepatology  [facility]      Again, thank you for allowing me to participate in the care of your patient.      Sincerely,    Thomas M. Leventhal, MD

## 2020-04-24 ENCOUNTER — TELEPHONE (OUTPATIENT)
Dept: TRANSPLANT | Facility: CLINIC | Age: 60
End: 2020-04-24

## 2020-05-01 DIAGNOSIS — Z11.59 ENCOUNTER FOR SCREENING FOR OTHER VIRAL DISEASES: Primary | ICD-10-CM

## 2020-05-06 PROBLEM — K83.1 BILIARY STRICTURE (H): Status: ACTIVE | Noted: 2020-03-10

## 2020-05-07 ENCOUNTER — TELEPHONE (OUTPATIENT)
Dept: SCHEDULING | Facility: CLINIC | Age: 60
End: 2020-05-07

## 2020-05-07 ENCOUNTER — RECORDS - HEALTHEAST (OUTPATIENT)
Dept: LAB | Facility: CLINIC | Age: 60
End: 2020-05-07

## 2020-05-07 LAB
ALBUMIN SERPL-MCNC: 3.6 G/DL (ref 3.5–5)
ALP SERPL-CCNC: 113 U/L (ref 45–120)
ALT SERPL W P-5'-P-CCNC: 13 U/L (ref 0–45)
ANION GAP SERPL CALCULATED.3IONS-SCNC: 7 MMOL/L (ref 5–18)
AST SERPL W P-5'-P-CCNC: 20 U/L (ref 0–40)
BILIRUB DIRECT SERPL-MCNC: 0.5 MG/DL
BILIRUB SERPL-MCNC: 2.1 MG/DL (ref 0–1)
BUN SERPL-MCNC: 44 MG/DL (ref 8–22)
CALCIUM SERPL-MCNC: 9.3 MG/DL (ref 8.5–10.5)
CHLORIDE BLD-SCNC: 104 MMOL/L (ref 98–107)
CO2 SERPL-SCNC: 27 MMOL/L (ref 22–31)
CREAT SERPL-MCNC: 1.02 MG/DL (ref 0.6–1.1)
GFR SERPL CREATININE-BSD FRML MDRD: 55 ML/MIN/1.73M2
GLUCOSE BLD-MCNC: 88 MG/DL (ref 70–125)
MAGNESIUM SERPL-MCNC: 1.5 MG/DL (ref 1.8–2.6)
POTASSIUM BLD-SCNC: 4.3 MMOL/L (ref 3.5–5)
PROT SERPL-MCNC: 6.5 G/DL (ref 6–8)
SODIUM SERPL-SCNC: 138 MMOL/L (ref 136–145)

## 2020-05-08 LAB
CYCLOSPORINE BLD LC/MS/MS-MCNC: 343 UG/L (ref 50–400)
TME LAST DOSE: NORMAL H

## 2020-05-08 NOTE — TELEPHONE ENCOUNTER
Reason for call:  Other   Patient called regarding (reason for call): call back  Additional comments: Patient wanting to see if it is possible to make her pre op COVID testing for 7:30 on 5/11/20 so its within 48 hrs of her surgery on 5/13/20.    Phone number to reach patient:  Home number on file [phone] (home)    Best Time:  As soon as possible    Can we leave a detailed message on this number?  YES    Travel screening: Not Applicable

## 2020-05-10 ENCOUNTER — OFFICE VISIT - HEALTHEAST (OUTPATIENT)
Dept: FAMILY MEDICINE | Facility: CLINIC | Age: 60
End: 2020-05-10

## 2020-05-10 DIAGNOSIS — Z20.822 SUSPECTED 2019 NOVEL CORONAVIRUS INFECTION: ICD-10-CM

## 2020-05-10 LAB
SARS-COV-2 PCR COMMENT: NORMAL
SARS-COV-2 RNA SPEC QL NAA+PROBE: NEGATIVE
SARS-COV-2 VIRUS SPECIMEN SOURCE: NORMAL

## 2020-05-11 ENCOUNTER — COMMUNICATION - HEALTHEAST (OUTPATIENT)
Dept: FAMILY MEDICINE | Facility: CLINIC | Age: 60
End: 2020-05-11

## 2020-05-12 ENCOUNTER — ANESTHESIA EVENT (OUTPATIENT)
Dept: SURGERY | Facility: CLINIC | Age: 60
End: 2020-05-12
Payer: COMMERCIAL

## 2020-05-13 ENCOUNTER — APPOINTMENT (OUTPATIENT)
Dept: GENERAL RADIOLOGY | Facility: CLINIC | Age: 60
End: 2020-05-13
Attending: INTERNAL MEDICINE
Payer: COMMERCIAL

## 2020-05-13 ENCOUNTER — ANESTHESIA (OUTPATIENT)
Dept: SURGERY | Facility: CLINIC | Age: 60
End: 2020-05-13
Payer: COMMERCIAL

## 2020-05-13 ENCOUNTER — HOSPITAL ENCOUNTER (OUTPATIENT)
Facility: CLINIC | Age: 60
Discharge: HOME | End: 2020-05-13
Attending: INTERNAL MEDICINE | Admitting: INTERNAL MEDICINE
Payer: COMMERCIAL

## 2020-05-13 VITALS
WEIGHT: 185.19 LBS | SYSTOLIC BLOOD PRESSURE: 130 MMHG | BODY MASS INDEX: 34.08 KG/M2 | HEIGHT: 62 IN | RESPIRATION RATE: 18 BRPM | DIASTOLIC BLOOD PRESSURE: 87 MMHG | HEART RATE: 61 BPM | OXYGEN SATURATION: 100 % | TEMPERATURE: 98.2 F

## 2020-05-13 DIAGNOSIS — K83.1 BILIARY STRICTURE (H): ICD-10-CM

## 2020-05-13 LAB
ALBUMIN SERPL-MCNC: 3.2 G/DL (ref 3.4–5)
ALP SERPL-CCNC: 118 U/L (ref 40–150)
ALT SERPL W P-5'-P-CCNC: 19 U/L (ref 0–50)
AMYLASE SERPL-CCNC: 33 U/L (ref 30–110)
ANION GAP SERPL CALCULATED.3IONS-SCNC: 6 MMOL/L (ref 3–14)
AST SERPL W P-5'-P-CCNC: 21 U/L (ref 0–45)
BILIRUB SERPL-MCNC: 1.8 MG/DL (ref 0.2–1.3)
BUN SERPL-MCNC: 40 MG/DL (ref 7–30)
CALCIUM SERPL-MCNC: 9.3 MG/DL (ref 8.5–10.1)
CHLORIDE SERPL-SCNC: 109 MMOL/L (ref 94–109)
CO2 SERPL-SCNC: 25 MMOL/L (ref 20–32)
CREAT SERPL-MCNC: 1.05 MG/DL (ref 0.52–1.04)
ERCP: NORMAL
ERYTHROCYTE [DISTWIDTH] IN BLOOD BY AUTOMATED COUNT: 12.2 % (ref 10–15)
GFR SERPL CREATININE-BSD FRML MDRD: 58 ML/MIN/{1.73_M2}
GLUCOSE BLDC GLUCOMTR-MCNC: 82 MG/DL (ref 70–99)
GLUCOSE SERPL-MCNC: 85 MG/DL (ref 70–99)
HCT VFR BLD AUTO: 32.9 % (ref 35–47)
HGB BLD-MCNC: 10.9 G/DL (ref 11.7–15.7)
LIPASE SERPL-CCNC: 39 U/L (ref 73–393)
MCH RBC QN AUTO: 32.1 PG (ref 26.5–33)
MCHC RBC AUTO-ENTMCNC: 33.1 G/DL (ref 31.5–36.5)
MCV RBC AUTO: 97 FL (ref 78–100)
PLATELET # BLD AUTO: 112 10E9/L (ref 150–450)
POTASSIUM SERPL-SCNC: 4.4 MMOL/L (ref 3.4–5.3)
PROT SERPL-MCNC: 6.5 G/DL (ref 6.8–8.8)
RBC # BLD AUTO: 3.4 10E12/L (ref 3.8–5.2)
SODIUM SERPL-SCNC: 140 MMOL/L (ref 133–144)
WBC # BLD AUTO: 4.7 10E9/L (ref 4–11)

## 2020-05-13 PROCEDURE — 25000565 ZZH ISOFLURANE, EA 15 MIN: Performed by: INTERNAL MEDICINE

## 2020-05-13 PROCEDURE — 71000014 ZZH RECOVERY PHASE 1 LEVEL 2 FIRST HR: Performed by: INTERNAL MEDICINE

## 2020-05-13 PROCEDURE — C1877 STENT, NON-COAT/COV W/O DEL: HCPCS | Performed by: INTERNAL MEDICINE

## 2020-05-13 PROCEDURE — 36415 COLL VENOUS BLD VENIPUNCTURE: CPT | Performed by: INTERNAL MEDICINE

## 2020-05-13 PROCEDURE — 82150 ASSAY OF AMYLASE: CPT | Performed by: INTERNAL MEDICINE

## 2020-05-13 PROCEDURE — 25000125 ZZHC RX 250: Performed by: INTERNAL MEDICINE

## 2020-05-13 PROCEDURE — C1769 GUIDE WIRE: HCPCS | Performed by: INTERNAL MEDICINE

## 2020-05-13 PROCEDURE — 37000009 ZZH ANESTHESIA TECHNICAL FEE, EACH ADDTL 15 MIN: Performed by: INTERNAL MEDICINE

## 2020-05-13 PROCEDURE — 40000170 ZZH STATISTIC PRE-PROCEDURE ASSESSMENT II: Performed by: INTERNAL MEDICINE

## 2020-05-13 PROCEDURE — 80053 COMPREHEN METABOLIC PANEL: CPT | Performed by: INTERNAL MEDICINE

## 2020-05-13 PROCEDURE — 25000128 H RX IP 250 OP 636: Performed by: NURSE ANESTHETIST, CERTIFIED REGISTERED

## 2020-05-13 PROCEDURE — 25000128 H RX IP 250 OP 636: Performed by: ANESTHESIOLOGY

## 2020-05-13 PROCEDURE — 37000008 ZZH ANESTHESIA TECHNICAL FEE, 1ST 30 MIN: Performed by: INTERNAL MEDICINE

## 2020-05-13 PROCEDURE — 36000059 ZZH SURGERY LEVEL 3 EA 15 ADDTL MIN UMMC: Performed by: INTERNAL MEDICINE

## 2020-05-13 PROCEDURE — C1726 CATH, BAL DIL, NON-VASCULAR: HCPCS | Performed by: INTERNAL MEDICINE

## 2020-05-13 PROCEDURE — 85027 COMPLETE CBC AUTOMATED: CPT | Performed by: INTERNAL MEDICINE

## 2020-05-13 PROCEDURE — 82962 GLUCOSE BLOOD TEST: CPT

## 2020-05-13 PROCEDURE — 36000061 ZZH SURGERY LEVEL 3 W FLUORO 1ST 30 MIN - UMMC: Performed by: INTERNAL MEDICINE

## 2020-05-13 PROCEDURE — 71000027 ZZH RECOVERY PHASE 2 EACH 15 MINS: Performed by: INTERNAL MEDICINE

## 2020-05-13 PROCEDURE — 25000125 ZZHC RX 250: Performed by: NURSE ANESTHETIST, CERTIFIED REGISTERED

## 2020-05-13 PROCEDURE — 25800030 ZZH RX IP 258 OP 636: Performed by: NURSE ANESTHETIST, CERTIFIED REGISTERED

## 2020-05-13 PROCEDURE — 25500064 ZZH RX 255 OP 636: Performed by: INTERNAL MEDICINE

## 2020-05-13 PROCEDURE — 83690 ASSAY OF LIPASE: CPT | Performed by: INTERNAL MEDICINE

## 2020-05-13 PROCEDURE — 27210794 ZZH OR GENERAL SUPPLY STERILE: Performed by: INTERNAL MEDICINE

## 2020-05-13 PROCEDURE — 40000279 XR SURGERY CARM FLUORO GREATER THAN 5 MIN W STILLS: Mod: TC

## 2020-05-13 DEVICE — STENT FREEMAN PANCREA FLEX 7FRX9CM SGL PIGTAIL
Type: IMPLANTABLE DEVICE | Site: BILE DUCT | Status: NON-FUNCTIONAL
Removed: 2021-05-12

## 2020-05-13 RX ORDER — ONDANSETRON 2 MG/ML
4 INJECTION INTRAMUSCULAR; INTRAVENOUS EVERY 30 MIN PRN
Status: DISCONTINUED | OUTPATIENT
Start: 2020-05-13 | End: 2020-05-13 | Stop reason: HOSPADM

## 2020-05-13 RX ORDER — ONDANSETRON 4 MG/1
4 TABLET, ORALLY DISINTEGRATING ORAL EVERY 30 MIN PRN
Status: DISCONTINUED | OUTPATIENT
Start: 2020-05-13 | End: 2020-05-13 | Stop reason: HOSPADM

## 2020-05-13 RX ORDER — IOPAMIDOL 510 MG/ML
INJECTION, SOLUTION INTRAVASCULAR PRN
Status: DISCONTINUED | OUTPATIENT
Start: 2020-05-13 | End: 2020-05-13 | Stop reason: HOSPADM

## 2020-05-13 RX ORDER — ESMOLOL HYDROCHLORIDE 10 MG/ML
INJECTION INTRAVENOUS PRN
Status: DISCONTINUED | OUTPATIENT
Start: 2020-05-13 | End: 2020-05-13

## 2020-05-13 RX ORDER — SODIUM CHLORIDE, SODIUM LACTATE, POTASSIUM CHLORIDE, CALCIUM CHLORIDE 600; 310; 30; 20 MG/100ML; MG/100ML; MG/100ML; MG/100ML
INJECTION, SOLUTION INTRAVENOUS CONTINUOUS PRN
Status: DISCONTINUED | OUTPATIENT
Start: 2020-05-13 | End: 2020-05-13

## 2020-05-13 RX ORDER — MEPERIDINE HYDROCHLORIDE 25 MG/ML
12.5 INJECTION INTRAMUSCULAR; INTRAVENOUS; SUBCUTANEOUS
Status: DISCONTINUED | OUTPATIENT
Start: 2020-05-13 | End: 2020-05-13 | Stop reason: HOSPADM

## 2020-05-13 RX ORDER — INDOMETHACIN 50 MG/1
100 SUPPOSITORY RECTAL
Status: DISCONTINUED | OUTPATIENT
Start: 2020-05-13 | End: 2020-05-13 | Stop reason: HOSPADM

## 2020-05-13 RX ORDER — ONDANSETRON 2 MG/ML
INJECTION INTRAMUSCULAR; INTRAVENOUS PRN
Status: DISCONTINUED | OUTPATIENT
Start: 2020-05-13 | End: 2020-05-13

## 2020-05-13 RX ORDER — FENTANYL CITRATE 50 UG/ML
INJECTION, SOLUTION INTRAMUSCULAR; INTRAVENOUS PRN
Status: DISCONTINUED | OUTPATIENT
Start: 2020-05-13 | End: 2020-05-13

## 2020-05-13 RX ORDER — FENTANYL CITRATE 50 UG/ML
25-50 INJECTION, SOLUTION INTRAMUSCULAR; INTRAVENOUS
Status: DISCONTINUED | OUTPATIENT
Start: 2020-05-13 | End: 2020-05-13 | Stop reason: HOSPADM

## 2020-05-13 RX ORDER — INDOMETHACIN 50 MG/1
SUPPOSITORY RECTAL PRN
Status: DISCONTINUED | OUTPATIENT
Start: 2020-05-13 | End: 2020-05-13 | Stop reason: HOSPADM

## 2020-05-13 RX ORDER — LEVOFLOXACIN 5 MG/ML
INJECTION, SOLUTION INTRAVENOUS PRN
Status: DISCONTINUED | OUTPATIENT
Start: 2020-05-13 | End: 2020-05-13

## 2020-05-13 RX ORDER — LIDOCAINE HYDROCHLORIDE 20 MG/ML
INJECTION, SOLUTION INFILTRATION; PERINEURAL PRN
Status: DISCONTINUED | OUTPATIENT
Start: 2020-05-13 | End: 2020-05-13

## 2020-05-13 RX ORDER — EPINEPHRINE IN SOD CHLOR,ISO 1 MG/10 ML
SYRINGE (ML) INTRAVENOUS PRN
Status: DISCONTINUED | OUTPATIENT
Start: 2020-05-13 | End: 2020-05-13 | Stop reason: HOSPADM

## 2020-05-13 RX ORDER — LIDOCAINE 40 MG/G
CREAM TOPICAL
Status: DISCONTINUED | OUTPATIENT
Start: 2020-05-13 | End: 2020-05-13 | Stop reason: HOSPADM

## 2020-05-13 RX ORDER — PROPOFOL 10 MG/ML
INJECTION, EMULSION INTRAVENOUS PRN
Status: DISCONTINUED | OUTPATIENT
Start: 2020-05-13 | End: 2020-05-13

## 2020-05-13 RX ORDER — SODIUM CHLORIDE, SODIUM LACTATE, POTASSIUM CHLORIDE, CALCIUM CHLORIDE 600; 310; 30; 20 MG/100ML; MG/100ML; MG/100ML; MG/100ML
INJECTION, SOLUTION INTRAVENOUS CONTINUOUS
Status: DISCONTINUED | OUTPATIENT
Start: 2020-05-13 | End: 2020-05-13 | Stop reason: HOSPADM

## 2020-05-13 RX ORDER — NALOXONE HYDROCHLORIDE 0.4 MG/ML
.1-.4 INJECTION, SOLUTION INTRAMUSCULAR; INTRAVENOUS; SUBCUTANEOUS
Status: DISCONTINUED | OUTPATIENT
Start: 2020-05-13 | End: 2020-05-13 | Stop reason: HOSPADM

## 2020-05-13 RX ADMIN — ONDANSETRON 4 MG: 2 INJECTION INTRAMUSCULAR; INTRAVENOUS at 08:10

## 2020-05-13 RX ADMIN — PROPOFOL 150 MG: 10 INJECTION, EMULSION INTRAVENOUS at 07:43

## 2020-05-13 RX ADMIN — PHENYLEPHRINE HYDROCHLORIDE 50 MCG: 10 INJECTION INTRAVENOUS at 08:03

## 2020-05-13 RX ADMIN — ESMOLOL HYDROCHLORIDE 20 MG: 10 INJECTION, SOLUTION INTRAVENOUS at 08:41

## 2020-05-13 RX ADMIN — ONDANSETRON 4 MG: 2 INJECTION INTRAMUSCULAR; INTRAVENOUS at 09:32

## 2020-05-13 RX ADMIN — MIDAZOLAM 1 MG: 1 INJECTION INTRAMUSCULAR; INTRAVENOUS at 07:32

## 2020-05-13 RX ADMIN — SODIUM CHLORIDE, POTASSIUM CHLORIDE, SODIUM LACTATE AND CALCIUM CHLORIDE: 600; 310; 30; 20 INJECTION, SOLUTION INTRAVENOUS at 07:18

## 2020-05-13 RX ADMIN — FENTANYL CITRATE 50 MCG: 50 INJECTION, SOLUTION INTRAMUSCULAR; INTRAVENOUS at 07:43

## 2020-05-13 RX ADMIN — ESMOLOL HYDROCHLORIDE 20 MG: 10 INJECTION, SOLUTION INTRAVENOUS at 08:47

## 2020-05-13 RX ADMIN — ROCURONIUM BROMIDE 50 MG: 10 INJECTION INTRAVENOUS at 07:45

## 2020-05-13 RX ADMIN — LIDOCAINE HYDROCHLORIDE 60 MG: 20 INJECTION, SOLUTION INFILTRATION; PERINEURAL at 07:43

## 2020-05-13 RX ADMIN — LEVOFLOXACIN 500 MG: 5 INJECTION, SOLUTION INTRAVENOUS at 07:56

## 2020-05-13 RX ADMIN — PROCHLORPERAZINE EDISYLATE 10 MG: 5 INJECTION INTRAMUSCULAR; INTRAVENOUS at 10:53

## 2020-05-13 RX ADMIN — SUGAMMADEX 200 MG: 100 INJECTION, SOLUTION INTRAVENOUS at 08:38

## 2020-05-13 RX ADMIN — FENTANYL CITRATE 50 MCG: 50 INJECTION, SOLUTION INTRAMUSCULAR; INTRAVENOUS at 08:09

## 2020-05-13 ASSESSMENT — MIFFLIN-ST. JEOR: SCORE: 1368.25

## 2020-05-13 ASSESSMENT — PAIN DESCRIPTION - DESCRIPTORS: DESCRIPTORS: ACHING

## 2020-05-13 NOTE — ANESTHESIA CARE TRANSFER NOTE
Patient: Nicci Pemberton    Procedure(s):  ENDOSCOPIC RETROGRADE CHOLANGIOPANCREATOGRAPHY,Stent exchange and sludge removal    Diagnosis: Biliary stricture [K83.1]  Diagnosis Additional Information: No value filed.    Anesthesia Type:   General     Note:  Airway :Nasal Cannula  Patient transferred to:PACU  Comments: Vss, Report to RN Handoff Report: Identifed the Patient, Identified the Reponsible Provider, Reviewed the pertinent medical history, Discussed the surgical course, Reviewed Intra-OP anesthesia mangement and issues during anesthesia, Set expectations for post-procedure period and Allowed opportunity for questions and acknowledgement of understanding      Vitals: (Last set prior to Anesthesia Care Transfer)    CRNA VITALS  5/13/2020 0818 - 5/13/2020 0854      5/13/2020             Pulse:  91    Ht Rate:  93    SpO2:  99 %    Resp Rate (observed):  (!) 3                Electronically Signed By: SANTINO Haro CRNA  May 13, 2020  8:54 AM

## 2020-05-13 NOTE — ANESTHESIA POSTPROCEDURE EVALUATION
Anesthesia POST Procedure Evaluation    Patient: Nicci Pemberton   MRN:     [id number] Gender:   female   Age:    59 year old :      1960        Preoperative Diagnosis: Biliary stricture [K83.1]   Procedure(s):  ENDOSCOPIC RETROGRADE CHOLANGIOPANCREATOGRAPHY,Stent exchange and sludge removal   Postop Comments: No value filed.     Anesthesia Type: General       Disposition: Outpatient   Postop Pain Control: Uneventful            Sign Out: Well controlled pain   PONV: No   Neuro/Psych: Uneventful            Sign Out: Acceptable/Baseline neuro status   Airway/Respiratory: Uneventful            Sign Out: Acceptable/Baseline resp. status   CV/Hemodynamics: Uneventful            Sign Out: Acceptable CV status   Other NRE: NONE   DID A NON-ROUTINE EVENT OCCUR? No         Last Anesthesia Record Vitals:  CRNA VITALS  2020 0818 - 2020 0918      2020             Pulse:  91    Ht Rate:  93    SpO2:  99 %    Resp Rate (observed):  (!) 3          Last PACU Vitals:  Vitals Value Taken Time   /74 2020 10:00 AM   Temp 36.5  C (97.7  F) 2020  9:45 AM   Pulse 61 2020 10:00 AM   Resp 16 2020  9:45 AM   SpO2 96 % 2020 10:02 AM   Temp src     NIBP     Pulse     SpO2     Resp     Temp     Ht Rate     Temp 2     Vitals shown include unvalidated device data.      Electronically Signed By: Gopi Canela DO, May 13, 2020, 10:03 AM

## 2020-05-13 NOTE — ANESTHESIA PREPROCEDURE EVALUATION
Anesthesia Pre-Procedure Evaluation    Patient: Nicci Pemberton   MRN:     [id number] Gender:   female   Age:    59 year old :      1960        Preoperative Diagnosis: Biliary stricture [K83.1]   Procedure(s):  ENDOSCOPIC RETROGRADE CHOLANGIOPANCREATOGRAPHY     LABS:  CBC:   Lab Results   Component Value Date    WBC 5.5 2020    WBC 5.9 2020    HGB 11.6 (L) 2020    HGB 11.0 (L) 2020    HCT 34.3 (L) 2020    HCT 33.5 (L) 2020     (L) 2020     (L) 2020     BMP:   Lab Results   Component Value Date     2020     2020    POTASSIUM 5.4 (H) 2020    POTASSIUM 5.0 2020    CHLORIDE 110 (H) 2020    CHLORIDE 113 (H) 2020    CO2 29 2020    CO2 27 2020    BUN 45 (H) 2020    BUN 40 (H) 2020    CR 1.03 2020    CR 1.00 2020    GLC 83 2020    GLC 79 2020     COAGS:   Lab Results   Component Value Date    PTT 39 (H) 2020    INR 1.16 (H) 2020    FIBR 317 2019     POC:   Lab Results   Component Value Date    BGM 82 2020    HCGS Negative 2018     OTHER:   Lab Results   Component Value Date    PH 7.46 (H) 2019    LACT 0.7 2019    A1C 5.0 2019    JACOB 9.6 2020    PHOS 4.4 2020    MAG 1.7 2020    ALBUMIN 3.6 2020    PROTTOTAL 7.1 2020    ALT 22 2020    AST 20 2020    ALKPHOS 115 2020    BILITOTAL 1.4 (H) 2020    LIPASE 79 2020    AMYLASE 96 02/10/2020    DOROTHEA 23 2019    TSH 0.14 (L) 2020    T4 0.70 (L) 2019    CRP 8.9 (H) 2018    SED 17 2018        Preop Vitals    BP Readings from Last 3 Encounters:   20 (!) 130/92   20 128/67   02/10/20 (!) 141/84    Pulse Readings from Last 3 Encounters:   20 70   20 63   02/10/20 53      Resp Readings from Last 3 Encounters:   20 16   20 16   02/10/20 16    SpO2 Readings  "from Last 3 Encounters:   05/13/20 100%   03/04/20 96%   02/10/20 94%      Temp Readings from Last 1 Encounters:   05/13/20 35.7  C (96.2  F) (Oral)    Ht Readings from Last 1 Encounters:   05/13/20 1.575 m (5' 2\")      Wt Readings from Last 1 Encounters:   05/13/20 84 kg (185 lb 3 oz)    Estimated body mass index is 33.87 kg/m  as calculated from the following:    Height as of this encounter: 1.575 m (5' 2\").    Weight as of this encounter: 84 kg (185 lb 3 oz).     LDA:  ETT (Active)   Number of days: 0        Past Medical History:   Diagnosis Date     Anemia      Cellulitis      Cirrhosis of liver (H) 6/18/2018     Heterozygous alpha 1-antitrypsin deficiency (H)      High hepatic iron concentration determined by biopsy of liver      Liver cirrhosis secondary to ANGULO (H)      Liver transplanted (H) 08/18/2019     Lymphedema      Osteoarthritis     knees     SBP (spontaneous bacterial peritonitis) (H) 8/2/2019 8/1/19 in [facility]      Past Surgical History:   Procedure Laterality Date     BENCH LIVER N/A 8/18/2019    Procedure: BACKBENCH PREPARATION, LIVER;  Surgeon: Mandeep Alford MD;  Location: [facility] OR     COLONOSCOPY N/A 6/22/2018    Procedure: COLONOSCOPY;  colonoscopy;  Surgeon: Hugo Patel MD;  Location: [facility] OR     ENDOSCOPIC RETROGRADE CHOLANGIOPANCREATOGRAM N/A 2/10/2020    Procedure: ENDOSCOPIC RETROGRADE CHOLANGIOPANCREATOGRAPHY with spinchterotomy;  Surgeon: Guru Rigoberto Canada MD;  Location: [facility] OR     ENDOSCOPIC RETROGRADE CHOLANGIOPANCREATOGRAPHY, EXCHANGE TUBE/STENT N/A 3/4/2020    Procedure: ENDOSCOPIC RETROGRADE CHOLANGIOPANCREATOGRAPHY, WITH biliary dilarion, stent exchange, stone removal;  Surgeon: Guru Rigoberto Canada MD;  [facility] OR     ESOPHAGOSCOPY, GASTROSCOPY, DUODENOSCOPY (EGD), COMBINED N/A 10/31/2019    Procedure: Esophagogastroduodenoscopy, With Biopsy;  Surgeon: Nerissa Aranda MD;  Location: [facility] GI     IR FEEDING TUBE PLACEMENT W " "MOHAN/MD  2019     IR FLUORO 0-1 HOUR  2019     IR LUMBAR PUNCTURE  2019     TRANSPLANT LIVER RECIPIENT  DONOR N/A 2019    Procedure: TRANSPLANT, LIVER, RECIPIENT,  DONOR;  Surgeon: Mandeep Alford MD;  Location: [facility] OR      Allergies   Allergen Reactions     Ciprofloxacin Dizziness and Nausea     Food      Fruits with cores and pits  Apples     Sulfa Drugs Unknown     Swollen joints     Furosemide Rash           Anesthesia Evaluation     . Pt has had prior anesthetic. Type: General (MAC 4, 7 ETT)               ROS/MED HX     ENT/Pulmonary:       Neurologic:       Cardiovascular:     (+) ----. : . . . :. . Previous cardiac testing date:results:Stress Testdate:19 results:dobutamine stress echocardiogram test negative for ischemia at diagnostic level of peak stress (87% MPHR).ECG reviewed date:10/4/19 results:SR, ISABELLA, NSSTT changes date: results:           METS/Exercise Tolerance:     Hematologic:     (+) Anemia, -       Musculoskeletal:   (+) arthritis,  -        GI/Hepatic: Comment: Hx of ANGULO and Heterozygous alpha 1 antitrypsin deficiency s/p Liver Transplant 19.  Elevated LFTs  Per imaging report \"common bile duct measures 8 mm with nonspecific echogenic intraluminal content\"     (+) GERD        Renal/Genitourinary:     (+) chronic renal disease,        Endo:          Psychiatric:          Infectious Disease:          Malignancy:          Other:                      PHYSICAL EXAM:   Mental Status/Neuro: A/A/O   Airway: Facies: Feasible  Mallampati: II  Mouth/Opening: Full  TM distance: > 6 cm  Neck ROM: Full   Respiratory: Auscultation: CTAB     Resp. Rate: Normal     Resp. Effort: Normal      CV: Rhythm: Regular  Rate: Age appropriate  Heart: Normal Sounds  Edema: None   Comments:      Dental: Normal Dentition                Assessment:   ASA SCORE: 2    H&P: History and physical reviewed and following examination; no interval change.   Smoking Status:  " Non-Smoker/Unknown   NPO Status: NPO Appropriate     Plan:   Anes. Type:  General   Pre-Medication: None   Induction:  IV (Standard)   Airway: ETT; Oral   Access/Monitoring: PIV   Maintenance: Balanced     Postop Plan:   Postop Pain: Opioids  Postop Sedation/Airway: Not planned  Disposition: Outpatient     PONV Management:   Adult Risk Factors: Female, Non-Smoker, Postop Opioids   Prevention: Ondansetron, Dexamethasone     CONSENT: Direct conversation   Plan and risks discussed with: Patient   Blood Products: Consent Deferred (Minimal Blood Loss)                   Gopi Canela DO

## 2020-05-13 NOTE — PROGRESS NOTES
SPIRITUAL HEALTH SERVICES  [facility] 3C   PRE-SURGERY VISIT    Had pre-surgery visit with pt.  Provided spiritual support, prayer. Pt stated she is [employer] - called admissions to update [employer] designation.  Shaka Davison) Valerie Grimaldo M.Div., [facility]  Staff   Pager [phone]

## 2020-05-13 NOTE — DISCHARGE INSTRUCTIONS
[facility]  Same-Day Surgery   Adult Discharge Orders & Instructions     For 24 hours after surgery    1. Get plenty of rest.  A responsible adult must stay with you for at least 24 hours after you leave the hospital.   2. Do not drive or use heavy equipment.  If you have weakness or tingling, don't drive or use heavy equipment until this feeling goes away.  3. Do not drink alcohol.  4. Avoid strenuous or risky activities.  Ask for help when climbing stairs.   5. You may feel lightheaded.  IF so, sit for a few minutes before standing.  Have someone help you get up.   6. If you have nausea (feel sick to your stomach): Drink only clear liquids such as apple juice, ginger ale, broth or 7-Up.  Rest may also help.  Be sure to drink enough fluids.  Move to a regular diet as you feel able.  7. You may have a slight fever. Call the doctor if your fever is over 100 F (37.7 C) (taken under the tongue) or lasts longer than 24 hours.  8. You may have a dry mouth, a sore throat, muscle aches or trouble sleeping.  These should go away after 24 hours.  9. Do not make important or legal decisions.   Call your doctor for any of the followin.  Signs of infection (fever, growing tenderness at the surgery site, a large amount of drainage or bleeding, severe pain, foul-smelling drainage, redness, swelling).    2. It has been over 8 to 10 hours since surgery and you are still not able to urinate (pass water).    3.  Headache for over 24 hours.      To contact a doctor, call ____Dr. Guru Canada @ [phone]__________________ or:        [phone] and ask for the resident on call for   ______________________________________________ (answered 24 hours a day)      Emergency Department:    [facility]: [phone]       (TTY for hearing impaired: [phone])

## 2020-05-15 ENCOUNTER — TELEPHONE (OUTPATIENT)
Dept: TRANSPLANT | Facility: CLINIC | Age: 60
End: 2020-05-15

## 2020-05-15 NOTE — TELEPHONE ENCOUNTER
Spoke with the patient and confirmed  appointments on 8/27/2020.  Informed patient an itinerary can be accessed on [employer], and will be sent via mail.

## 2020-05-18 ENCOUNTER — TELEPHONE (OUTPATIENT)
Dept: TRANSPLANT | Facility: CLINIC | Age: 60
End: 2020-05-18

## 2020-05-18 ENCOUNTER — PATIENT OUTREACH (OUTPATIENT)
Dept: GASTROENTEROLOGY | Facility: CLINIC | Age: 60
End: 2020-05-18

## 2020-05-18 DIAGNOSIS — K83.1 BILE DUCT STRICTURE (H): Primary | ICD-10-CM

## 2020-05-18 NOTE — TELEPHONE ENCOUNTER
Post ERCP (5/13/2020) with Dr. Canada: Follow-up    Post procedure recommendations:   Confirm spontaneous stent passage by performing a flat     and upright abdominal x-ray in 4 weeks. Stent is    expected to pass spontaneously     - Follow LFTs in liver clinic     Orders placed: abd xr 4 weeks, stent book noted.     Patient states: feeling ok, pain getting better, no concerning symptoms, scheduling number given.       Clinic contact and scheduling numbers verified for future questions/concerns.    Jennifer Pedro, RN Care Coordinator

## 2020-05-27 DIAGNOSIS — Z79.899 LONG TERM CURRENT USE OF IMMUNOSUPPRESSIVE DRUG: ICD-10-CM

## 2020-05-27 DIAGNOSIS — Z94.4 LIVER REPLACED BY TRANSPLANT (H): ICD-10-CM

## 2020-05-27 LAB
ALBUMIN SERPL-MCNC: 3.5 G/DL (ref 3.4–5)
ALP SERPL-CCNC: 135 U/L (ref 40–150)
ALT SERPL W P-5'-P-CCNC: 21 U/L (ref 0–50)
ANION GAP SERPL CALCULATED.3IONS-SCNC: 3 MMOL/L (ref 3–14)
AST SERPL W P-5'-P-CCNC: 22 U/L (ref 0–45)
BILIRUB DIRECT SERPL-MCNC: 0.3 MG/DL (ref 0–0.2)
BILIRUB SERPL-MCNC: 1.2 MG/DL (ref 0.2–1.3)
BUN SERPL-MCNC: 35 MG/DL (ref 7–30)
CALCIUM SERPL-MCNC: 8.7 MG/DL (ref 8.5–10.1)
CHLORIDE SERPL-SCNC: 111 MMOL/L (ref 94–109)
CO2 SERPL-SCNC: 28 MMOL/L (ref 20–32)
CREAT SERPL-MCNC: 1.1 MG/DL (ref 0.52–1.04)
CYCLOSPORINE BLD LC/MS/MS-MCNC: 202 UG/L (ref 50–400)
ERYTHROCYTE [DISTWIDTH] IN BLOOD BY AUTOMATED COUNT: 11.9 % (ref 10–15)
GFR SERPL CREATININE-BSD FRML MDRD: 55 ML/MIN/{1.73_M2}
GLUCOSE SERPL-MCNC: 93 MG/DL (ref 70–99)
HCT VFR BLD AUTO: 34.6 % (ref 35–47)
HGB BLD-MCNC: 11.6 G/DL (ref 11.7–15.7)
MAGNESIUM SERPL-MCNC: 1.8 MG/DL (ref 1.6–2.3)
MCH RBC QN AUTO: 32.5 PG (ref 26.5–33)
MCHC RBC AUTO-ENTMCNC: 33.5 G/DL (ref 31.5–36.5)
MCV RBC AUTO: 97 FL (ref 78–100)
PHOSPHATE SERPL-MCNC: 3.4 MG/DL (ref 2.5–4.5)
PLATELET # BLD AUTO: 152 10E9/L (ref 150–450)
POTASSIUM SERPL-SCNC: 4.6 MMOL/L (ref 3.4–5.3)
PROT SERPL-MCNC: 7.4 G/DL (ref 6.8–8.8)
RBC # BLD AUTO: 3.57 10E12/L (ref 3.8–5.2)
SODIUM SERPL-SCNC: 142 MMOL/L (ref 133–144)
TME LAST DOSE: 2200 H
WBC # BLD AUTO: 6.4 10E9/L (ref 4–11)

## 2020-05-27 PROCEDURE — 83735 ASSAY OF MAGNESIUM: CPT | Performed by: TRANSPLANT SURGERY

## 2020-05-27 PROCEDURE — 36415 COLL VENOUS BLD VENIPUNCTURE: CPT | Performed by: TRANSPLANT SURGERY

## 2020-05-27 PROCEDURE — 85027 COMPLETE CBC AUTOMATED: CPT | Performed by: TRANSPLANT SURGERY

## 2020-05-27 PROCEDURE — 80158 DRUG ASSAY CYCLOSPORINE: CPT | Performed by: TRANSPLANT SURGERY

## 2020-05-27 PROCEDURE — 80048 BASIC METABOLIC PNL TOTAL CA: CPT | Performed by: TRANSPLANT SURGERY

## 2020-05-27 PROCEDURE — 80076 HEPATIC FUNCTION PANEL: CPT | Performed by: TRANSPLANT SURGERY

## 2020-05-27 PROCEDURE — 84100 ASSAY OF PHOSPHORUS: CPT | Performed by: TRANSPLANT SURGERY

## 2020-06-02 ENCOUNTER — TELEPHONE (OUTPATIENT)
Dept: TRANSPLANT | Facility: CLINIC | Age: 60
End: 2020-06-02

## 2020-06-02 DIAGNOSIS — M25.569 KNEE PAIN: ICD-10-CM

## 2020-06-02 DIAGNOSIS — E03.9 HYPOTHYROID: Primary | ICD-10-CM

## 2020-06-02 NOTE — TELEPHONE ENCOUNTER
Left message for patient that she will have to ensure that her PCP follows up on result.     Patient to consider knee injection to assist with pain and not take a lot of ASA.

## 2020-06-02 NOTE — TELEPHONE ENCOUNTER
Patient Call: luis is wondering if we can add a TSH test to her lab order having labs drawn [facility]  06/10/2020 at 0930      Call back needed? Yes    Return Call Needed  Same as documented in contacts section  When to return call?: Greater than one day: Route standard priority

## 2020-06-04 NOTE — TELEPHONE ENCOUNTER
Patient's ortho doctor, Dr Tidwell, does not have rights to do surgery at the [facility] and asking for assistance with getting patient an appt with DR Emerson Wallace to do knee replacement. Referral entered. Patient aware.   Pt will for sure let PCP know the result of TSH.

## 2020-06-04 NOTE — TELEPHONE ENCOUNTER
Patient Call: Voicemail  Date/Time: 6/4/2020-10:22am  Reason for call: Pt is looking for a return phone call from coordinator

## 2020-06-10 ENCOUNTER — ANCILLARY PROCEDURE (OUTPATIENT)
Dept: GENERAL RADIOLOGY | Facility: CLINIC | Age: 60
End: 2020-06-10
Payer: COMMERCIAL

## 2020-06-10 DIAGNOSIS — Z79.899 LONG TERM CURRENT USE OF IMMUNOSUPPRESSIVE DRUG: ICD-10-CM

## 2020-06-10 DIAGNOSIS — K83.1 BILE DUCT STRICTURE (H): ICD-10-CM

## 2020-06-10 DIAGNOSIS — Z94.4 LIVER REPLACED BY TRANSPLANT (H): ICD-10-CM

## 2020-06-10 DIAGNOSIS — E03.9 HYPOTHYROID: ICD-10-CM

## 2020-06-10 LAB
ALBUMIN SERPL-MCNC: 3.5 G/DL (ref 3.4–5)
ALP SERPL-CCNC: 134 U/L (ref 40–150)
ALT SERPL W P-5'-P-CCNC: 22 U/L (ref 0–50)
ANION GAP SERPL CALCULATED.3IONS-SCNC: 5 MMOL/L (ref 3–14)
AST SERPL W P-5'-P-CCNC: 19 U/L (ref 0–45)
BILIRUB DIRECT SERPL-MCNC: 0.2 MG/DL (ref 0–0.2)
BILIRUB SERPL-MCNC: 1.2 MG/DL (ref 0.2–1.3)
BUN SERPL-MCNC: 40 MG/DL (ref 7–30)
CALCIUM SERPL-MCNC: 9.4 MG/DL (ref 8.5–10.1)
CHLORIDE SERPL-SCNC: 106 MMOL/L (ref 94–109)
CO2 SERPL-SCNC: 28 MMOL/L (ref 20–32)
CREAT SERPL-MCNC: 1.27 MG/DL (ref 0.52–1.04)
CYCLOSPORINE BLD LC/MS/MS-MCNC: 182 UG/L (ref 50–400)
ERYTHROCYTE [DISTWIDTH] IN BLOOD BY AUTOMATED COUNT: 12.2 % (ref 10–15)
GFR SERPL CREATININE-BSD FRML MDRD: 46 ML/MIN/{1.73_M2}
GLUCOSE SERPL-MCNC: 92 MG/DL (ref 70–99)
HCT VFR BLD AUTO: 36 % (ref 35–47)
HGB BLD-MCNC: 12 G/DL (ref 11.7–15.7)
MAGNESIUM SERPL-MCNC: 1.9 MG/DL (ref 1.6–2.3)
MCH RBC QN AUTO: 32.6 PG (ref 26.5–33)
MCHC RBC AUTO-ENTMCNC: 33.3 G/DL (ref 31.5–36.5)
MCV RBC AUTO: 98 FL (ref 78–100)
PHOSPHATE SERPL-MCNC: 3.4 MG/DL (ref 2.5–4.5)
PLATELET # BLD AUTO: 129 10E9/L (ref 150–450)
POTASSIUM SERPL-SCNC: 4.4 MMOL/L (ref 3.4–5.3)
PROT SERPL-MCNC: 7.3 G/DL (ref 6.8–8.8)
RBC # BLD AUTO: 3.68 10E12/L (ref 3.8–5.2)
SODIUM SERPL-SCNC: 139 MMOL/L (ref 133–144)
TME LAST DOSE: NORMAL H
TSH SERPL DL<=0.005 MIU/L-ACNC: 0.71 MU/L (ref 0.4–4)
WBC # BLD AUTO: 5.2 10E9/L (ref 4–11)

## 2020-06-15 ENCOUNTER — VIRTUAL VISIT (OUTPATIENT)
Dept: ORTHOPEDICS | Facility: CLINIC | Age: 60
End: 2020-06-15
Payer: COMMERCIAL

## 2020-06-15 ENCOUNTER — TELEPHONE (OUTPATIENT)
Dept: TRANSPLANT | Facility: CLINIC | Age: 60
End: 2020-06-15

## 2020-06-15 DIAGNOSIS — G89.29 CHRONIC PAIN OF BOTH KNEES: ICD-10-CM

## 2020-06-15 DIAGNOSIS — M25.562 CHRONIC PAIN OF BOTH KNEES: ICD-10-CM

## 2020-06-15 DIAGNOSIS — M25.561 CHRONIC PAIN OF BOTH KNEES: ICD-10-CM

## 2020-06-15 DIAGNOSIS — Z94.4 STATUS POST LIVER TRANSPLANTATION (H): ICD-10-CM

## 2020-06-15 RX ORDER — CYCLOSPORINE 100 MG/1
100 CAPSULE, LIQUID FILLED ORAL EVERY 12 HOURS
Qty: 180 CAPSULE | Refills: 3 | Status: SHIPPED | OUTPATIENT
Start: 2020-06-15 | End: 2020-08-31

## 2020-06-15 NOTE — LETTER
6/15/2020      RE: Nicci Pemberton  [address] [facility] [address]      Dear Colleague,    Thank you for referring your patient, Nicci Pemberton, to the [facility]. Please see a copy of my visit note below.        [facility]  Orthopaedic Surgery, Joint Replacement Consultation  by Mario Wallace M.D.    Nicci Pemberton0 MRN# [id number]   Age: 60 year old YOB: 1960     Requesting physician: Wale Ramon            Assessment and Plan:   Assessment:  60-year-old female with a past medical history that includes liver transplantation in August 2019 with debilitating bilateral knee pain due to end-stage medial compartment osteoarthritic changes.     Plan:  Obtain new bilateral knee x-ray images AP, lateral, sunrise views.      We will have to schedule patient for an in person clinic visit in order to perform physical examination (varus correction/lymphedema).     I had a long discussion with the patient regarding ongoing management options.  Reviewed surgical and nonsurgical treatments.  We reviewed total knee replacement in detail including the procedure, the implants, the recovery process, and long-term outcomes.  We reviewed that the risks of the surgery include but are not limited to infection, wound problems, stiffness, persistent pain, swelling, clicking, loosening, revision surgery.  We also reviewed less common risks such as neurovascular injury fracture, and other implant-related issues.  We reviewed other medical complications such as a blood clot.  We discussed that the vast majority of cases have a highly successful outcome.  However there is a small subset of patients that do experience complications or problems following the knee replacement and these problems can be very debilitating and painful and sometimes do not improve.   Based on a discussion of the risks and benefits, we believe that the benefits far outweigh the risks at  this point and the patient   would like to proceed with surgery with either UKA or TKA.  We will work on scheduling surgery at a time that works well for them in the next few months.  They will contact us if they have any questions or concerns leading up to surgery.    We already discussed with her that preoperatively she will see her primary care physician, the anesthesiologist and dentist.  Furthermore she will attend a joint replacement class.          History of Present Illness:   60 year old female with a past medical history that includes liver transplantation was referred to us because of bilateral knee pain.  This pain is been present for 3 years and has been progressive over time.  She is experiencing night pain and initiation pain.  She currently ambulates 95% of the time with a cane.  She walks in and around the house.  For pain medication she is tried Tylenol and NSAIDs with insufficient pain relief.  She has had 3 corticosteroids and 2 Synvisc injections in the past.  Patient has not tried any bracing because of her peripheral lymphedema- she was not able to fit that appropriately.  Patient is currently on cyclosporine immunosuppressants.      Current symptoms:  Problem: Bilateral knee pain.      Onset and duration: 3 years  Awakens from sleep due to sx's:  Yes  Precipitating Injury:  No    Other joints or sites painful:  No      Background history:  DX:  1. Liver failure    TREATMENTS:  1. August 18, 2019, liver transplant, [facility]               Physical Exam:       EXAMINATION pertinent findings:   VITAL SIGNS: There were no vitals taken for this visit.  There is no height or weight on file to calculate BMI.   PSYCH: Pleasant, healthy-appearing, alert, oriented x3, cooperative. Normal mood and affect.  RESP: non labored breathing    SKIN: grossly normal   LYMPHATIC: grossly normal   NEURO: grossly normal   VASCULAR: satisfactory perfusion of all extremities   MUSCULOSKELETAL:   Antalgic gait.  Range of  motion appears to be 95-0 for both knees.  Difficult to  lymphedema.           Data:   All laboratory data reviewed  All imaging studies reviewed by me    CR knee left 10/7/2019:   Bone-on-bone medial compartment osteoarthritic changes.        Raymond Molina MD  [facility] Arthroplasty Fellow        Attending MD (Dr. Mario Wallace) Attestation :  This patient was seen and evaluated by me including a history, exam, and interpretation of all imaging and/or lab data.  Either a training physician (resident/fellow), who also saw the patient, or scribe has documented the visit in the attached note.    MD Mike Del Rio Family Professor  Oncology and Adult Reconstructive Surgery  Dept Orthopaedic Surgery, [facility]  [phone] office, [phone] pager  [email or web address]        DATA for DOCUMENTATION:         Past Medical History:     Patient Active Problem List   Diagnosis     Liver cirrhosis secondary to ANGULO (H)     Heterozygous alpha 1-antitrypsin deficiency (H)     High hepatic iron concentration determined by biopsy of liver     Status post liver transplantation (H)     C. difficile diarrhea     Steroid-induced hyperglycemia     Immunosuppressed status (H)     S/P liver transplant (H)     Vertigo     Otitis media     Bile duct stricture     Cholangitis     Biliary stricture     Past Medical History:   Diagnosis Date     Anemia      Cellulitis      Cirrhosis of liver (H) 6/18/2018     Heterozygous alpha 1-antitrypsin deficiency (H)      High hepatic iron concentration determined by biopsy of liver      Liver cirrhosis secondary to ANGULO (H)      Liver transplanted (H) 08/18/2019     Lymphedema      Osteoarthritis     knees     SBP (spontaneous bacterial peritonitis) (H) 8/2/2019 8/1/19 in [facility]       Also see scanned health assessment forms.       Past Surgical History:     Past Surgical History:   Procedure Laterality Date     BENCH LIVER N/A 8/18/2019    Procedure: BACKBENCH PREPARATION, LIVER;   Surgeon: Mandeep Alford MD;  [facility]     COLONOSCOPY N/A 2018    Procedure: COLONOSCOPY;  colonoscopy;  Surgeon: Hugo Patel MD;  [facility]     ENDOSCOPIC RETROGRADE CHOLANGIOPANCREATOGRAM N/A 2/10/2020    Procedure: ENDOSCOPIC RETROGRADE CHOLANGIOPANCREATOGRAPHY with spinchterotomy;  Surgeon: Guru Rigoberto Canada MD;  [facility]     ENDOSCOPIC RETROGRADE CHOLANGIOPANCREATOGRAM N/A 2020    Procedure: ENDOSCOPIC RETROGRADE CHOLANGIOPANCREATOGRAPHY,Stent exchange and sludge removal;  Surgeon: Guru Rigoberto Canada MD;  [facility]     ENDOSCOPIC RETROGRADE CHOLANGIOPANCREATOGRAPHY, EXCHANGE TUBE/STENT N/A 3/4/2020    Procedure: ENDOSCOPIC RETROGRADE CHOLANGIOPANCREATOGRAPHY, WITH biliary dilarion, stent exchange, stone removal;  Surgeon: Guru Rigoberto Canada MD;  [facility]     ESOPHAGOSCOPY, GASTROSCOPY, DUODENOSCOPY (EGD), COMBINED N/A 10/31/2019    Procedure: Esophagogastroduodenoscopy, With Biopsy;  Surgeon: Nerissa Aranda MD;  [facility] GI     IR FEEDING TUBE PLACEMENT W MOHAN/MD  2019     IR FLUORO 0-1 HOUR  2019     IR LUMBAR PUNCTURE  2019     TRANSPLANT LIVER RECIPIENT  DONOR N/A 2019    Procedure: TRANSPLANT, LIVER, RECIPIENT,  DONOR;  Surgeon: Mandeep Alford MD;  [facility] OR            Social History:     Social History     Socioeconomic History     Marital status:      Spouse name: Not on file     Number of children: Not on file     Years of education: Not on file     Highest education level: Not on file   Occupational History     Not on file   Social Needs     Financial resource strain: Not on file     Food insecurity     Worry: Not on file     Inability: Not on file     Transportation needs     Medical: Not on file     Non-medical: Not on file   Tobacco Use     Smoking status: Former Smoker     Packs/day: 0.00     Last attempt to quit:  2011     Years since quittin.4     Smokeless tobacco: Never Used   Substance and Sexual Activity     Alcohol use: No     Drug use: No     Sexual activity: Not on file   Lifestyle     Physical activity     Days per week: Not on file     Minutes per session: Not on file     Stress: Not on file   Relationships     Social connections     Talks on phone: Not on file     Gets together: Not on file     Attends Orthodox service: Not on file     Active member of club or organization: Not on file     Attends meetings of clubs or organizations: Not on file     Relationship status: Not on file     Intimate partner violence     Fear of current or ex partner: Not on file     Emotionally abused: Not on file     Physically abused: Not on file     Forced sexual activity: Not on file   Other Topics Concern     Parent/sibling w/ CABG, MI or angioplasty before 65F 55M? Not Asked   Social History Narrative     Not on file            Family History:       Family History   Problem Relation Age of Onset     Cirrhosis No family hx of      Liver Cancer No family hx of             Medications:     Current Outpatient Medications   Medication Sig     aspirin (ASA) 81 MG tablet Take 1 tablet (81 mg) by mouth daily     Cholecalciferol (VITAMIN D-3) 25 MCG (1000 UT) CAPS Take 1,000 Units by mouth 2 times daily (Patient taking differently: Take 1,000 Units by mouth 2 times daily Takes 2000 daily in AM)     cycloSPORINE modified (GENERIC EQUIVALENT) 100 MG capsule Take 1 capsule (100 mg) by mouth every 12 hours *125 mg AM and 125 mg PM     cycloSPORINE modified (GENERIC EQUIVALENT) 25 MG capsule Take 1 capsule (25 mg) by mouth every morning AND 1 capsule (25 mg) every evening. *total dose of 125 mg AM and 125 mg PM     famotidine (PEPCID) 20 MG tablet Take 1 tablet (20 mg) by mouth 2 times daily     levothyroxine (SYNTHROID/LEVOTHROID) 150 MCG tablet Take by mouth daily      oxyCODONE (ROXICODONE) 5 MG tablet Take 1 tablet (5 mg) by mouth every  "6 hours as needed     No current facility-administered medications for this visit.               Review of Systems:   A comprehensive 10 point review of systems (constitutional, ENT, cardiac, peripheral vascular, lymphatic, respiratory, GI, , Musculoskeletal, skin, Neurological) was performed and found to be negative except as described in this note.     See intake form completed by patient        Reason For Visit:   Chief Complaint   Patient presents with     Consult     Send link via txt: [phone]. Referred by Rocío for bilateral knee pain. Interested in surgery. Pt's transplant doctor prefer pt to have surgery at the [facility]. Pt had seen Dr Tidwell from [facility] in [place]. Imaging was completed within the last 6 months.        There were no vitals taken for this visit.    Pain Assessment  Patient Currently in Pain: Yes  0-10 Pain Scale: 8  Primary Pain Location: Knee(bilateral)    Montse Lomax LPN    Nicci Pemberton is a 60 year old female who is being evaluated via a billable video visit.      The patient has been notified of following:     \"This video visit will be conducted via a call between you and your physician/provider. We have found that certain health care needs can be provided without the need for an in-person physical exam.  This service lets us provide the care you need with a video conversation.  If a prescription is necessary we can send it directly to your pharmacy.  If lab work is needed we can place an order for that and you can then stop by our lab to have the test done at a later time.    Video visits are billed at different rates depending on your insurance coverage.  Please reach out to your insurance provider with any questions.    If during the course of the call the physician/provider feels a video visit is not appropriate, you will not be charged for this service.\"    Patient has given verbal consent for Video visit? yes    Will anyone " else be joining your video visit? No        Video-Visit Details    Type of service:  Video Visit    Video duration 20 min. Pre and post video work 15 rajat, total time 35 min    Originating Location (pt. Location): Home    Distant Location (provider location):  [facility]     Platform used for Video Visit: [facility]    Mario Wallace MD

## 2020-06-15 NOTE — PROGRESS NOTES
[facility]  Orthopaedic Surgery, Joint Replacement Consultation  by Mario Wallace M.D.    Nicci Pemberton0 MRN# [id number]   Age: 60 year old YOB: 1960     Requesting physician: Wale Ramon            Assessment and Plan:   Assessment:  60-year-old female with a past medical history that includes liver transplantation in August 2019 with debilitating bilateral knee pain due to end-stage medial compartment osteoarthritic changes.     Plan:  Obtain new bilateral knee x-ray images AP, lateral, sunrise views.      We will have to schedule patient for an in person clinic visit in order to perform physical examination (varus correction/lymphedema).     I had a long discussion with the patient regarding ongoing management options.  Reviewed surgical and nonsurgical treatments.  We reviewed total knee replacement in detail including the procedure, the implants, the recovery process, and long-term outcomes.  We reviewed that the risks of the surgery include but are not limited to infection, wound problems, stiffness, persistent pain, swelling, clicking, loosening, revision surgery.  We also reviewed less common risks such as neurovascular injury fracture, and other implant-related issues.  We reviewed other medical complications such as a blood clot.  We discussed that the vast majority of cases have a highly successful outcome.  However there is a small subset of patients that do experience complications or problems following the knee replacement and these problems can be very debilitating and painful and sometimes do not improve.   Based on a discussion of the risks and benefits, we believe that the benefits far outweigh the risks at this point and the patient   would like to proceed with surgery with either UKA or TKA.  We will work on scheduling surgery at a time that works well for them in the next few months.  They will contact us if they have any questions or  concerns leading up to surgery.    We already discussed with her that preoperatively she will see her primary care physician, the anesthesiologist and dentist.  Furthermore she will attend a joint replacement class.          History of Present Illness:   60 year old female with a past medical history that includes liver transplantation was referred to us because of bilateral knee pain.  This pain is been present for 3 years and has been progressive over time.  She is experiencing night pain and initiation pain.  She currently ambulates 95% of the time with a cane.  She walks in and around the house.  For pain medication she is tried Tylenol and NSAIDs with insufficient pain relief.  She has had 3 corticosteroids and 2 Synvisc injections in the past.  Patient has not tried any bracing because of her peripheral lymphedema- she was not able to fit that appropriately.  Patient is currently on cyclosporine immunosuppressants.      Current symptoms:  Problem: Bilateral knee pain.      Onset and duration: 3 years  Awakens from sleep due to sx's:  Yes  Precipitating Injury:  No    Other joints or sites painful:  No      Background history:  DX:  1. Liver failure    TREATMENTS:  1. August 18, 2019, liver transplant, [facility]               Physical Exam:       EXAMINATION pertinent findings:   VITAL SIGNS: There were no vitals taken for this visit.  There is no height or weight on file to calculate BMI.   PSYCH: Pleasant, healthy-appearing, alert, oriented x3, cooperative. Normal mood and affect.  RESP: non labored breathing    SKIN: grossly normal   LYMPHATIC: grossly normal   NEURO: grossly normal   VASCULAR: satisfactory perfusion of all extremities   MUSCULOSKELETAL:   Antalgic gait.  Range of motion appears to be 95-0 for both knees.  Difficult to  lymphedema.           Data:   All laboratory data reviewed  All imaging studies reviewed by bill ESCOBAR knee left 10/7/2019:   Bone-on-bone medial compartment osteoarthritic  changes.        Raymond Molina MD  [facility] Arthroplasty Fellow        Attending MD (Dr. Mario Wallace) Attestation :  This patient was seen and evaluated by me including a history, exam, and interpretation of all imaging and/or lab data.  Either a training physician (resident/fellow), who also saw the patient, or scribe has documented the visit in the attached note.    Mario Wallace MD  [facility] Family Professor  Oncology and Adult Reconstructive Surgery  Dept Orthopaedic Surgery, [facility]  [phone] office, [phone] pager  [email or web address]        DATA for DOCUMENTATION:         Past Medical History:     Patient Active Problem List   Diagnosis     Liver cirrhosis secondary to ANGULO (H)     Heterozygous alpha 1-antitrypsin deficiency (H)     High hepatic iron concentration determined by biopsy of liver     Status post liver transplantation (H)     C. difficile diarrhea     Steroid-induced hyperglycemia     Immunosuppressed status (H)     S/P liver transplant (H)     Vertigo     Otitis media     Bile duct stricture     Cholangitis     Biliary stricture     Past Medical History:   Diagnosis Date     Anemia      Cellulitis      Cirrhosis of liver (H) 6/18/2018     Heterozygous alpha 1-antitrypsin deficiency (H)      High hepatic iron concentration determined by biopsy of liver      Liver cirrhosis secondary to ANGULO (H)      Liver transplanted (H) 08/18/2019     Lymphedema      Osteoarthritis     knees     SBP (spontaneous bacterial peritonitis) (H) 8/2/2019 8/1/19 in [facility]       Also see scanned health assessment forms.       Past Surgical History:     Past Surgical History:   Procedure Laterality Date     BENCH LIVER N/A 8/18/2019    Procedure: BACKBENCH PREPARATION, LIVER;  Surgeon: Mandeep Alford MD;  [facility]     COLONOSCOPY N/A 6/22/2018    Procedure: COLONOSCOPY;  colonoscopy;  Surgeon: Hugo Patel MD;  [facility]     ENDOSCOPIC RETROGRADE CHOLANGIOPANCREATOGRAM N/A  2/10/2020    Procedure: ENDOSCOPIC RETROGRADE CHOLANGIOPANCREATOGRAPHY with spinchterotomy;  Surgeon: Guru Rigoberto Canada MD;  [facility] OR     ENDOSCOPIC RETROGRADE CHOLANGIOPANCREATOGRAM N/A 2020    Procedure: ENDOSCOPIC RETROGRADE CHOLANGIOPANCREATOGRAPHY,Stent exchange and sludge removal;  Surgeon: Guru Rigoberto Canada MD;  [facility] OR     ENDOSCOPIC RETROGRADE CHOLANGIOPANCREATOGRAPHY, EXCHANGE TUBE/STENT N/A 3/4/2020    Procedure: ENDOSCOPIC RETROGRADE CHOLANGIOPANCREATOGRAPHY, WITH biliary dilarion, stent exchange, stone removal;  Surgeon: Guru Rigoberto Canada MD;  [facility]     ESOPHAGOSCOPY, GASTROSCOPY, DUODENOSCOPY (EGD), COMBINED N/A 10/31/2019    Procedure: Esophagogastroduodenoscopy, With Biopsy;  Surgeon: Nerissa Aranda MD;  [facility]     IR FEEDING TUBE PLACEMENT W MOHAN/MD  2019     IR FLUORO 0-1 HOUR  2019     IR LUMBAR PUNCTURE  2019     TRANSPLANT LIVER RECIPIENT  DONOR N/A 2019    Procedure: TRANSPLANT, LIVER, RECIPIENT,  DONOR;  Surgeon: Mandeep Alford MD;  [facility] OR            Social History:     Social History     Socioeconomic History     Marital status:      Spouse name: Not on file     Number of children: Not on file     Years of education: Not on file     Highest education level: Not on file   Occupational History     Not on file   Social Needs     Financial resource strain: Not on file     Food insecurity     Worry: Not on file     Inability: Not on file     Transportation needs     Medical: Not on file     Non-medical: Not on file   Tobacco Use     Smoking status: Former Smoker     Packs/day: 0.00     Last attempt to quit:      Years since quittin.4     Smokeless tobacco: Never Used   Substance and Sexual Activity     Alcohol use: No     Drug use: No     Sexual activity: Not on file   Lifestyle     Physical activity     Days per week: Not  on file     Minutes per session: Not on file     Stress: Not on file   Relationships     Social connections     Talks on phone: Not on file     Gets together: Not on file     Attends Caodaism service: Not on file     Active member of club or organization: Not on file     Attends meetings of clubs or organizations: Not on file     Relationship status: Not on file     Intimate partner violence     Fear of current or ex partner: Not on file     Emotionally abused: Not on file     Physically abused: Not on file     Forced sexual activity: Not on file   Other Topics Concern     Parent/sibling w/ CABG, MI or angioplasty before 65F 55M? Not Asked   Social History Narrative     Not on file            Family History:       Family History   Problem Relation Age of Onset     Cirrhosis No family hx of      Liver Cancer No family hx of             Medications:     Current Outpatient Medications   Medication Sig     aspirin (ASA) 81 MG tablet Take 1 tablet (81 mg) by mouth daily     Cholecalciferol (VITAMIN D-3) 25 MCG (1000 UT) CAPS Take 1,000 Units by mouth 2 times daily (Patient taking differently: Take 1,000 Units by mouth 2 times daily Takes 2000 daily in AM)     cycloSPORINE modified (GENERIC EQUIVALENT) 100 MG capsule Take 1 capsule (100 mg) by mouth every 12 hours *125 mg AM and 125 mg PM     cycloSPORINE modified (GENERIC EQUIVALENT) 25 MG capsule Take 1 capsule (25 mg) by mouth every morning AND 1 capsule (25 mg) every evening. *total dose of 125 mg AM and 125 mg PM     famotidine (PEPCID) 20 MG tablet Take 1 tablet (20 mg) by mouth 2 times daily     levothyroxine (SYNTHROID/LEVOTHROID) 150 MCG tablet Take by mouth daily      oxyCODONE (ROXICODONE) 5 MG tablet Take 1 tablet (5 mg) by mouth every 6 hours as needed     No current facility-administered medications for this visit.               Review of Systems:   A comprehensive 10 point review of systems (constitutional, ENT, cardiac, peripheral vascular, lymphatic,  respiratory, GI, , Musculoskeletal, skin, Neurological) was performed and found to be negative except as described in this note.     See intake form completed by patient

## 2020-06-15 NOTE — TELEPHONE ENCOUNTER
Spoke with patient her CSA level above range. Patient's dose is 125 mg every 12 hours. Patient will decrease 100 mg every 12 hours.     Patient repeated plan and dose change.

## 2020-06-15 NOTE — PROGRESS NOTES
"Reason For Visit:   Chief Complaint   Patient presents with     Consult     Send link via txt: [phone]. Referred by Rocío for bilateral knee pain. Interested in surgery. Pt's transplant doctor prefer pt to have surgery at the [facility]. Pt had seen Dr Tidwell from [facility] in [place]. Imaging was completed within the last 6 months.        There were no vitals taken for this visit.    Pain Assessment  Patient Currently in Pain: Yes  0-10 Pain Scale: 8  Primary Pain Location: Knee(bilateral)    Montse LomaxTODD    Nicci Pemberton is a 60 year old female who is being evaluated via a billable video visit.      The patient has been notified of following:     \"This video visit will be conducted via a call between you and your physician/provider. We have found that certain health care needs can be provided without the need for an in-person physical exam.  This service lets us provide the care you need with a video conversation.  If a prescription is necessary we can send it directly to your pharmacy.  If lab work is needed we can place an order for that and you can then stop by our lab to have the test done at a later time.    Video visits are billed at different rates depending on your insurance coverage.  Please reach out to your insurance provider with any questions.    If during the course of the call the physician/provider feels a video visit is not appropriate, you will not be charged for this service.\"    Patient has given verbal consent for Video visit? yes    Will anyone else be joining your video visit? No        Video-Visit Details    Type of service:  Video Visit    Video duration 20 min. Pre and post video work 15 rajat, total time 35 min    Originating Location (pt. Location): Home    Distant Location (provider location):  [facility]     Platform used for Video Visit: Jaime Wallace MD      "

## 2020-06-25 ENCOUNTER — ANCILLARY PROCEDURE (OUTPATIENT)
Dept: GENERAL RADIOLOGY | Facility: CLINIC | Age: 60
End: 2020-06-25
Attending: ORTHOPAEDIC SURGERY
Payer: COMMERCIAL

## 2020-06-25 ENCOUNTER — OFFICE VISIT (OUTPATIENT)
Dept: ORTHOPEDICS | Facility: CLINIC | Age: 60
End: 2020-06-25
Payer: COMMERCIAL

## 2020-06-25 DIAGNOSIS — Z94.4 LIVER REPLACED BY TRANSPLANT (H): ICD-10-CM

## 2020-06-25 DIAGNOSIS — M25.562 CHRONIC PAIN OF BOTH KNEES: ICD-10-CM

## 2020-06-25 DIAGNOSIS — G89.29 CHRONIC PAIN OF BOTH KNEES: ICD-10-CM

## 2020-06-25 DIAGNOSIS — M25.561 CHRONIC PAIN OF BOTH KNEES: ICD-10-CM

## 2020-06-25 DIAGNOSIS — M25.562 CHRONIC PAIN OF BOTH KNEES: Primary | ICD-10-CM

## 2020-06-25 DIAGNOSIS — Z79.899 LONG TERM CURRENT USE OF IMMUNOSUPPRESSIVE DRUG: ICD-10-CM

## 2020-06-25 DIAGNOSIS — M25.561 CHRONIC PAIN OF BOTH KNEES: Primary | ICD-10-CM

## 2020-06-25 DIAGNOSIS — G89.29 CHRONIC PAIN OF BOTH KNEES: Primary | ICD-10-CM

## 2020-06-25 PROBLEM — M79.89 LEG SWELLING: Status: ACTIVE | Noted: 2019-07-29

## 2020-06-25 PROBLEM — K80.00 GALLBLADDER CALCULUS WITH ACUTE CHOLECYSTITIS AND NO OBSTRUCTION: Status: ACTIVE | Noted: 2020-06-25

## 2020-06-25 PROBLEM — E66.9 OBESITY WITH SERIOUS COMORBIDITY: Status: ACTIVE | Noted: 2017-08-23

## 2020-06-25 PROBLEM — M17.9 OSTEOARTHRITIS OF KNEE: Status: ACTIVE | Noted: 2020-06-25

## 2020-06-25 PROBLEM — K59.01 SLOW TRANSIT CONSTIPATION: Status: ACTIVE | Noted: 2020-06-25

## 2020-06-25 PROBLEM — I89.0 LYMPHEDEMA: Status: ACTIVE | Noted: 2020-06-25

## 2020-06-25 PROBLEM — I87.303 VENOUS HYPERTENSION OF LOWER EXTREMITY, BILATERAL: Status: ACTIVE | Noted: 2019-07-29

## 2020-06-25 PROBLEM — N95.0 POSTMENOPAUSAL BLEEDING: Status: ACTIVE | Noted: 2020-06-25

## 2020-06-25 PROBLEM — E60 ZINC DEFICIENCY: Status: ACTIVE | Noted: 2020-06-25

## 2020-06-25 PROBLEM — D69.6 THROMBOCYTOPENIA (H): Status: ACTIVE | Noted: 2020-06-25

## 2020-06-25 PROBLEM — R53.81 MALAISE: Status: ACTIVE | Noted: 2020-06-25

## 2020-06-25 PROBLEM — E87.1 HYPONATREMIA: Status: ACTIVE | Noted: 2020-06-25

## 2020-06-25 PROBLEM — K21.9 GASTROESOPHAGEAL REFLUX DISEASE WITHOUT ESOPHAGITIS: Status: ACTIVE | Noted: 2020-06-25

## 2020-06-25 PROBLEM — G47.62 CRAMP IN LOWER EXTREMITY ASSOCIATED WITH SLEEP: Status: ACTIVE | Noted: 2019-07-29

## 2020-06-25 PROBLEM — D75.89 MACROCYTOSIS WITHOUT ANEMIA: Status: ACTIVE | Noted: 2020-06-25

## 2020-06-25 PROBLEM — M17.0 OSTEOARTHRITIS OF BOTH KNEES, UNSPECIFIED OSTEOARTHRITIS TYPE: Status: ACTIVE | Noted: 2019-07-29

## 2020-06-25 PROBLEM — R94.5 ABNORMAL LIVER FUNCTION: Status: ACTIVE | Noted: 2020-06-25

## 2020-06-25 PROBLEM — K52.9 ENTEROCOLITIS: Status: ACTIVE | Noted: 2019-07-31

## 2020-06-25 PROBLEM — K76.0 NONALCOHOLIC FATTY LIVER DISEASE: Status: ACTIVE | Noted: 2020-06-25

## 2020-06-25 PROBLEM — I10 ESSENTIAL HYPERTENSION: Status: ACTIVE | Noted: 2020-06-25

## 2020-06-25 PROBLEM — K83.1 OBSTRUCTION OF BILE DUCT (H): Status: ACTIVE | Noted: 2020-06-25

## 2020-06-25 LAB
ALBUMIN SERPL-MCNC: 3.6 G/DL (ref 3.4–5)
ALP SERPL-CCNC: 132 U/L (ref 40–150)
ALT SERPL W P-5'-P-CCNC: 26 U/L (ref 0–50)
ANION GAP SERPL CALCULATED.3IONS-SCNC: 4 MMOL/L (ref 3–14)
AST SERPL W P-5'-P-CCNC: 23 U/L (ref 0–45)
BILIRUB DIRECT SERPL-MCNC: 0.3 MG/DL (ref 0–0.2)
BILIRUB SERPL-MCNC: 1.8 MG/DL (ref 0.2–1.3)
BUN SERPL-MCNC: 28 MG/DL (ref 7–30)
CALCIUM SERPL-MCNC: 9.3 MG/DL (ref 8.5–10.1)
CHLORIDE SERPL-SCNC: 109 MMOL/L (ref 94–109)
CO2 SERPL-SCNC: 28 MMOL/L (ref 20–32)
CREAT SERPL-MCNC: 1.08 MG/DL (ref 0.52–1.04)
CYCLOSPORINE BLD LC/MS/MS-MCNC: 133 UG/L (ref 50–400)
ERYTHROCYTE [DISTWIDTH] IN BLOOD BY AUTOMATED COUNT: 11.9 % (ref 10–15)
GFR SERPL CREATININE-BSD FRML MDRD: 56 ML/MIN/{1.73_M2}
GLUCOSE SERPL-MCNC: 88 MG/DL (ref 70–99)
HCT VFR BLD AUTO: 36.7 % (ref 35–47)
HGB BLD-MCNC: 12.2 G/DL (ref 11.7–15.7)
MAGNESIUM SERPL-MCNC: 1.9 MG/DL (ref 1.6–2.3)
MCH RBC QN AUTO: 32.6 PG (ref 26.5–33)
MCHC RBC AUTO-ENTMCNC: 33.2 G/DL (ref 31.5–36.5)
MCV RBC AUTO: 98 FL (ref 78–100)
PHOSPHATE SERPL-MCNC: 3.6 MG/DL (ref 2.5–4.5)
PLATELET # BLD AUTO: 135 10E9/L (ref 150–450)
POTASSIUM SERPL-SCNC: 4.4 MMOL/L (ref 3.4–5.3)
PROT SERPL-MCNC: 7.3 G/DL (ref 6.8–8.8)
RBC # BLD AUTO: 3.74 10E12/L (ref 3.8–5.2)
SODIUM SERPL-SCNC: 141 MMOL/L (ref 133–144)
TME LAST DOSE: NORMAL H
WBC # BLD AUTO: 5.1 10E9/L (ref 4–11)

## 2020-06-25 ASSESSMENT — ENCOUNTER SYMPTOMS
NIGHT SWEATS: 0
BACK PAIN: 0
WEIGHT LOSS: 0
POLYDIPSIA: 0
ALTERED TEMPERATURE REGULATION: 0
WEIGHT GAIN: 1
BRUISES/BLEEDS EASILY: 0
EYE WATERING: 0
MYALGIAS: 0
FEVER: 0
CHILLS: 0
MUSCLE CRAMPS: 0
MUSCLE WEAKNESS: 0
EYE PAIN: 0
NECK PAIN: 0
INCREASED ENERGY: 0
HALLUCINATIONS: 0
POLYPHAGIA: 0
EYE REDNESS: 0
FATIGUE: 0
DECREASED APPETITE: 0
DOUBLE VISION: 0
EYE IRRITATION: 0
ARTHRALGIAS: 1
JOINT SWELLING: 1
SWOLLEN GLANDS: 0
STIFFNESS: 1

## 2020-06-25 ASSESSMENT — ACTIVITIES OF DAILY LIVING (ADL): FUNCTION,_DAILY_LIVING_SCORE: 27.94

## 2020-06-25 ASSESSMENT — KOOS JR: HOW SEVERE IS YOUR KNEE STIFFNESS AFTER FIRST WAKING IN MORNING: EXTREME

## 2020-06-25 NOTE — PROGRESS NOTES
[facility]  Orthopaedic Surgery, Joint Replacement Consultation  by Mario Wallace M.D.    Nicci Pemberton0 MRN# [id number]   Age: 60 year old YOB: 1960     Requesting physician: Wale Ramon            Assessment and Plan:   Assessment:  60-year-old female with a past medical history that includes liver transplantation in August 2019 with debilitating bilateral knee pain due to end-stage medial and patellofemoral compartment osteoarthritic changes.     Plan:    We again had a long discussion with the patient regarding ongoing management options.  Reviewed surgical and nonsurgical treatments.  We reviewed total knee replacement in detail including the procedure, the implants, the recovery process, and long-term outcomes.  We reviewed that the risks of the surgery include but are not limited to infection, wound problems, stiffness, persistent pain, swelling, clicking, loosening, revision surgery.  We also reviewed less common risks such as neurovascular injury fracture, and other implant-related issues.  We reviewed other medical complications such as a blood clot.  We discussed that the vast majority of cases have a highly successful outcome.  However there is a small subset of patients that do experience complications or problems following the knee replacement and these problems can be very debilitating and painful and sometimes do not improve.   Based on a discussion of the risks and benefits, we believe that the benefits far outweigh the risks at this point and the patient   would like to proceed with surgery with a TKA left.  We will work on scheduling surgery at a time that works well for them in the next few months.  They will contact us if they have any questions or concerns leading up to surgery.    We already discussed with her that preoperatively she will see her primary care physician, the anesthesiologist and dentist.  Furthermore she will attend a joint  replacement class.          History of Present Illness:   60 year old female with a past medical history that includes liver transplantation was referred to us because of bilateral knee pain.  This pain is been present for 3 years and has been progressive over time.  She is experiencing night pain and initiation pain.  She currently ambulates 95% of the time with a cane.  She walks in and around the house.  For pain medication she is tried Tylenol and NSAIDs with insufficient pain relief.  She has had 3 corticosteroids and 2 Synvisc injections in the past.  Patient has not tried any bracing because of her peripheral lymphedema- she was not able to fit that appropriately.  Patient is currently on cyclosporine immunosuppressants.  We previously seen her during a virtual clinic visit.  She is here today for a physical examination of her varus deformity and lymphedema.      Current symptoms:  Problem: Bilateral knee pain.      Onset and duration: 3 years  Awakens from sleep due to sx's:  Yes  Precipitating Injury:  No    Other joints or sites painful:  No      Background history:  DX:  1. Liver failure    TREATMENTS:  1. August 18, 2019, liver transplant, [facility]               Physical Exam:       EXAMINATION pertinent findings:   VITAL SIGNS: There were no vitals taken for this visit.  There is no height or weight on file to calculate BMI.   PSYCH: Pleasant, healthy-appearing, alert, oriented x3, cooperative. Normal mood and affect.  RESP: non labored breathing    SKIN: grossly normal   LYMPHATIC: grossly normal   NEURO: grossly normal   VASCULAR: satisfactory perfusion of all extremities   MUSCULOSKELETAL:   Antalgic gait.  Range of motion appears to be 95-0 for both knees.  Non-correctable varus deformity on both knees.  Neurovascular intact lower extremities.  Very well controlled lymphedema.           Data:   All laboratory data reviewed  All imaging studies reviewed by bill ESCOBAR knee left today:  Bone-on-bone medial  compartment and patellofemoral compartment osteoarthritic changes.        Raymond Molina MD  [facility] Arthroplasty Fellow        Mario Wallace MD  [facility] Professor  Oncology and Adult Reconstructive Surgery  Dept Orthopaedic Surgery, [facility]  [phone] office, [phone] pager  [email or web address]        DATA for DOCUMENTATION:         Past Medical History:     Patient Active Problem List   Diagnosis     Liver cirrhosis secondary to ANGULO (H)     Heterozygous alpha 1-antitrypsin deficiency (H)     High hepatic iron concentration determined by biopsy of liver     Status post liver transplantation (H)     C. difficile diarrhea     Steroid-induced hyperglycemia     Immunosuppressed status (H)     S/P liver transplant (H)     Vertigo     Otitis media     Bile duct stricture     Cholangitis     Biliary stricture     Past Medical History:   Diagnosis Date     Anemia      Cellulitis      Cirrhosis of liver (H) 6/18/2018     Heterozygous alpha 1-antitrypsin deficiency (H)      High hepatic iron concentration determined by biopsy of liver      Liver cirrhosis secondary to ANGULO (H)      Liver transplanted (H) 08/18/2019     Lymphedema      Osteoarthritis     knees     SBP (spontaneous bacterial peritonitis) (H) 8/2/2019 8/1/19 in [place]       Also see scanned health assessment forms.       Past Surgical History:     Past Surgical History:   Procedure Laterality Date     BENCH LIVER N/A 8/18/2019    Procedure: BACKBENCH PREPARATION, LIVER;  Surgeon: Mandeep Alford MD;  [facility]     COLONOSCOPY N/A 6/22/2018    Procedure: COLONOSCOPY;  colonoscopy;  Surgeon: Hugo Patel MD;  [facility]     ENDOSCOPIC RETROGRADE CHOLANGIOPANCREATOGRAM N/A 2/10/2020    Procedure: ENDOSCOPIC RETROGRADE CHOLANGIOPANCREATOGRAPHY with spinchterotomy;  Surgeon: Guru Rigoberto Canada MD;  [facility] OR     ENDOSCOPIC RETROGRADE CHOLANGIOPANCREATOGRAM N/A 5/13/2020    Procedure: ENDOSCOPIC  RETROGRADE CHOLANGIOPANCREATOGRAPHY,Stent exchange and sludge removal;  Surgeon: Guru Rigoberto Canada MD;  [facility]     ENDOSCOPIC RETROGRADE CHOLANGIOPANCREATOGRAPHY, EXCHANGE TUBE/STENT N/A 3/4/2020    Procedure: ENDOSCOPIC RETROGRADE CHOLANGIOPANCREATOGRAPHY, WITH biliary dilarion, stent exchange, stone removal;  Surgeon: Guru Rigoberto Canada MD;  [facility]     ESOPHAGOSCOPY, GASTROSCOPY, DUODENOSCOPY (EGD), COMBINED N/A 10/31/2019    Procedure: Esophagogastroduodenoscopy, With Biopsy;  Surgeon: Nerissa Aranda MD;  [facility] GI     IR FEEDING TUBE PLACEMENT W MOHAN/MD  2019     IR FLUORO 0-1 HOUR  2019     IR LUMBAR PUNCTURE  2019     TRANSPLANT LIVER RECIPIENT  DONOR N/A 2019    Procedure: TRANSPLANT, LIVER, RECIPIENT,  DONOR;  Surgeon: Mandeep Alford MD;  Location: [facility] OR            Social History:     Social History     Socioeconomic History     Marital status:      Spouse name: Not on file     Number of children: Not on file     Years of education: Not on file     Highest education level: Not on file   Occupational History     Not on file   Social Needs     Financial resource strain: Not on file     Food insecurity     Worry: Not on file     Inability: Not on file     Transportation needs     Medical: Not on file     Non-medical: Not on file   Tobacco Use     Smoking status: Former Smoker     Packs/day: 0.00     Last attempt to quit:      Years since quittin.4     Smokeless tobacco: Never Used   Substance and Sexual Activity     Alcohol use: No     Drug use: No     Sexual activity: Not on file   Lifestyle     Physical activity     Days per week: Not on file     Minutes per session: Not on file     Stress: Not on file   Relationships     Social connections     Talks on phone: Not on file     Gets together: Not on file     Attends Taoist service: Not on file     Active member of club or  organization: Not on file     Attends meetings of clubs or organizations: Not on file     Relationship status: Not on file     Intimate partner violence     Fear of current or ex partner: Not on file     Emotionally abused: Not on file     Physically abused: Not on file     Forced sexual activity: Not on file   Other Topics Concern     Parent/sibling w/ CABG, MI or angioplasty before 65F 55M? Not Asked   Social History Narrative     Not on file            Family History:       Family History   Problem Relation Age of Onset     Cirrhosis No family hx of      Liver Cancer No family hx of             Medications:     Current Outpatient Medications   Medication Sig     aspirin (ASA) 81 MG tablet Take 1 tablet (81 mg) by mouth daily     Cholecalciferol (VITAMIN D-3) 25 MCG (1000 UT) CAPS Take 1,000 Units by mouth 2 times daily (Patient taking differently: Take 1,000 Units by mouth 2 times daily Takes 2000 daily in AM)     cycloSPORINE modified (GENERIC EQUIVALENT) 100 MG capsule Take 1 capsule (100 mg) by mouth every 12 hours     famotidine (PEPCID) 20 MG tablet Take 1 tablet (20 mg) by mouth 2 times daily     levothyroxine (SYNTHROID/LEVOTHROID) 150 MCG tablet Take by mouth daily      oxyCODONE (ROXICODONE) 5 MG tablet Take 1 tablet (5 mg) by mouth every 6 hours as needed     No current facility-administered medications for this visit.               Review of Systems:   A comprehensive 10 point review of systems (constitutional, ENT, cardiac, peripheral vascular, lymphatic, respiratory, GI, , Musculoskeletal, skin, Neurological) was performed and found to be negative except as described in this note.     See intake form completed by patient        Answers for HPI/ROS submitted by the patient on 6/25/2020   General Symptoms: Yes  Skin Symptoms: No  HENT Symptoms: No  EYE SYMPTOMS: Yes  HEART SYMPTOMS: No  LUNG SYMPTOMS: No  INTESTINAL SYMPTOMS: No  URINARY SYMPTOMS: No  GYNECOLOGIC SYMPTOMS: No  BREAST SYMPTOMS:  No  SKELETAL SYMPTOMS: Yes  BLOOD SYMPTOMS: Yes  NERVOUS SYSTEM SYMPTOMS: No  MENTAL HEALTH SYMPTOMS: No  Fever: No  Loss of appetite: No  Weight loss: No  Weight gain: Yes  Fatigue: No  Night sweats: No  Chills: No  Increased stress: No  Excessive hunger: No  Excessive thirst: No  Feeling hot or cold when others believe the temperature is normal: No  Loss of height: No  Post-operative complications: No  Surgical site pain: No  Hallucinations: No  Change in or Loss of Energy: No  Hyperactivity: No  Confusion: No  Eye pain: No  Vision loss: No  Dry eyes: No  Watery eyes: No  Eye bulging: No  Double vision: No  Flashing of lights: No  Spots: No  Floaters: Yes  Redness: No  Crossed eyes: No  Tunnel Vision: No  Yellowing of eyes: No  Eye irritation: No  Back pain: No  Muscle aches: No  Neck pain: No  Swollen joints: Yes  Joint pain: Yes  Bone pain: Yes  Muscle cramps: No  Muscle weakness: No  Joint stiffness: Yes  Bone fracture: No  Anemia: No  Swollen glands: No  Easy bleeding or bruising: No  Edema or swelling: Yes

## 2020-06-25 NOTE — LETTER
6/25/2020     RE: Nicci Pemberton  [address]  [place] [address]    Dear Colleague,    Thank you for referring your patient, Nicci Pemberton, to the [facility]. Please see a copy of my visit note below.    [facility]  Orthopaedic Surgery, Joint Replacement Consultation  by Mario Wallace M.D.    Nicci Pemberton0 MRN# [id number]   Age: 60 year old YOB: 1960     Requesting physician: Wale Ramon            Assessment and Plan:   Assessment:  60-year-old female with a past medical history that includes liver transplantation in August 2019 with debilitating bilateral knee pain due to end-stage medial and patellofemoral compartment osteoarthritic changes.     Plan:    We again had a long discussion with the patient regarding ongoing management options.  Reviewed surgical and nonsurgical treatments.  We reviewed total knee replacement in detail including the procedure, the implants, the recovery process, and long-term outcomes.  We reviewed that the risks of the surgery include but are not limited to infection, wound problems, stiffness, persistent pain, swelling, clicking, loosening, revision surgery.  We also reviewed less common risks such as neurovascular injury fracture, and other implant-related issues.  We reviewed other medical complications such as a blood clot.  We discussed that the vast majority of cases have a highly successful outcome.  However there is a small subset of patients that do experience complications or problems following the knee replacement and these problems can be very debilitating and painful and sometimes do not improve.   Based on a discussion of the risks and benefits, we believe that the benefits far outweigh the risks at this point and the patient   would like to proceed with surgery with a TKA left.  We will work on scheduling surgery at a time that works well for them in the next few months.  They will contact  us if they have any questions or concerns leading up to surgery.    We already discussed with her that preoperatively she will see her primary care physician, the anesthesiologist and dentist.  Furthermore she will attend a joint replacement class.          History of Present Illness:   60 year old female with a past medical history that includes liver transplantation was referred to us because of bilateral knee pain.  This pain is been present for 3 years and has been progressive over time.  She is experiencing night pain and initiation pain.  She currently ambulates 95% of the time with a cane.  She walks in and around the house.  For pain medication she is tried Tylenol and NSAIDs with insufficient pain relief.  She has had 3 corticosteroids and 2 Synvisc injections in the past.  Patient has not tried any bracing because of her peripheral lymphedema- she was not able to fit that appropriately.  Patient is currently on cyclosporine immunosuppressants.  We previously seen her during a virtual clinic visit.  She is here today for a physical examination of her varus deformity and lymphedema.      Current symptoms:  Problem: Bilateral knee pain.      Onset and duration: 3 years  Awakens from sleep due to sx's:  Yes  Precipitating Injury:  No    Other joints or sites painful:  No      Background history:  DX:  1. Liver failure    TREATMENTS:  1. August 18, 2019, liver transplant, [facility]               Physical Exam:       EXAMINATION pertinent findings:   VITAL SIGNS: There were no vitals taken for this visit.  There is no height or weight on file to calculate BMI.   PSYCH: Pleasant, healthy-appearing, alert, oriented x3, cooperative. Normal mood and affect.  RESP: non labored breathing    SKIN: grossly normal   LYMPHATIC: grossly normal   NEURO: grossly normal   VASCULAR: satisfactory perfusion of all extremities   MUSCULOSKELETAL:   Antalgic gait.  Range of motion appears to be 95-0 for both knees.  Non-correctable  varus deformity on both knees.  Neurovascular intact lower extremities.  Very well controlled lymphedema.           Data:   All laboratory data reviewed  All imaging studies reviewed by me    CR knee left today:  Bone-on-bone medial compartment and patellofemoral compartment osteoarthritic changes.        Raymond Molina MD  [facility] Arthroplasty Fellow        MD [facility] Family Professor  Oncology and Adult Reconstructive Surgery  Dept Orthopaedic Surgery, [facility]  [phone] office, [phone] pager  [email or web address]        DATA for DOCUMENTATION:         Past Medical History:     Patient Active Problem List   Diagnosis     Liver cirrhosis secondary to ANGULO (H)     Heterozygous alpha 1-antitrypsin deficiency (H)     High hepatic iron concentration determined by biopsy of liver     Status post liver transplantation (H)     C. difficile diarrhea     Steroid-induced hyperglycemia     Immunosuppressed status (H)     S/P liver transplant (H)     Vertigo     Otitis media     Bile duct stricture     Cholangitis     Biliary stricture     Past Medical History:   Diagnosis Date     Anemia      Cellulitis      Cirrhosis of liver (H) 6/18/2018     Heterozygous alpha 1-antitrypsin deficiency (H)      High hepatic iron concentration determined by biopsy of liver      Liver cirrhosis secondary to ANGULO (H)      Liver transplanted (H) 08/18/2019     Lymphedema      Osteoarthritis     knees     SBP (spontaneous bacterial peritonitis) (H) 8/2/2019 8/1/19 in [place]       Also see scanned health assessment forms.       Past Surgical History:     Past Surgical History:   Procedure Laterality Date     BENCH LIVER N/A 8/18/2019    Procedure: BACKBENCH PREPARATION, LIVER;  Surgeon: Mandeep Alford MD;  [facility] OR     COLONOSCOPY N/A 6/22/2018    Procedure: COLONOSCOPY;  colonoscopy;  Surgeon: Hugo Patel MD;  [facility]     ENDOSCOPIC RETROGRADE CHOLANGIOPANCREATOGRAM N/A 2/10/2020     Procedure: ENDOSCOPIC RETROGRADE CHOLANGIOPANCREATOGRAPHY with spinchterotomy;  Surgeon: Guru Rigoberto Canada MD;  [facility] OR     ENDOSCOPIC RETROGRADE CHOLANGIOPANCREATOGRAM N/A 2020    Procedure: ENDOSCOPIC RETROGRADE CHOLANGIOPANCREATOGRAPHY,Stent exchange and sludge removal;  Surgeon: Guru Rigoberto Canada MD;  [facility] OR     ENDOSCOPIC RETROGRADE CHOLANGIOPANCREATOGRAPHY, EXCHANGE TUBE/STENT N/A 3/4/2020    Procedure: ENDOSCOPIC RETROGRADE CHOLANGIOPANCREATOGRAPHY, WITH biliary dilarion, stent exchange, stone removal;  Surgeon: Guru Rigoberto Canada MD;  [facility] OR     ESOPHAGOSCOPY, GASTROSCOPY, DUODENOSCOPY (EGD), COMBINED N/A 10/31/2019    Procedure: Esophagogastroduodenoscopy, With Biopsy;  Surgeon: Nerissa Aranda MD;  [facility]     IR FEEDING TUBE PLACEMENT W MOHAN/MD  2019     IR FLUORO 0-1 HOUR  2019     IR LUMBAR PUNCTURE  2019     TRANSPLANT LIVER RECIPIENT  DONOR N/A 2019    Procedure: TRANSPLANT, LIVER, RECIPIENT,  DONOR;  Surgeon: Mandeep Alford MD;  [facility] OR            Social History:     Social History     Socioeconomic History     Marital status:      Spouse name: Not on file     Number of children: Not on file     Years of education: Not on file     Highest education level: Not on file   Occupational History     Not on file   Social Needs     Financial resource strain: Not on file     Food insecurity     Worry: Not on file     Inability: Not on file     Transportation needs     Medical: Not on file     Non-medical: Not on file   Tobacco Use     Smoking status: Former Smoker     Packs/day: 0.00     Last attempt to quit:      Years since quittin.4     Smokeless tobacco: Never Used   Substance and Sexual Activity     Alcohol use: No     Drug use: No     Sexual activity: Not on file   Lifestyle     Physical activity     Days per week: Not on file      Minutes per session: Not on file     Stress: Not on file   Relationships     Social connections     Talks on phone: Not on file     Gets together: Not on file     Attends Adventist service: Not on file     Active member of club or organization: Not on file     Attends meetings of clubs or organizations: Not on file     Relationship status: Not on file     Intimate partner violence     Fear of current or ex partner: Not on file     Emotionally abused: Not on file     Physically abused: Not on file     Forced sexual activity: Not on file   Other Topics Concern     Parent/sibling w/ CABG, MI or angioplasty before 65F 55M? Not Asked   Social History Narrative     Not on file            Family History:       Family History   Problem Relation Age of Onset     Cirrhosis No family hx of      Liver Cancer No family hx of             Medications:     Current Outpatient Medications   Medication Sig     aspirin (ASA) 81 MG tablet Take 1 tablet (81 mg) by mouth daily     Cholecalciferol (VITAMIN D-3) 25 MCG (1000 UT) CAPS Take 1,000 Units by mouth 2 times daily (Patient taking differently: Take 1,000 Units by mouth 2 times daily Takes 2000 daily in AM)     cycloSPORINE modified (GENERIC EQUIVALENT) 100 MG capsule Take 1 capsule (100 mg) by mouth every 12 hours     famotidine (PEPCID) 20 MG tablet Take 1 tablet (20 mg) by mouth 2 times daily     levothyroxine (SYNTHROID/LEVOTHROID) 150 MCG tablet Take by mouth daily      oxyCODONE (ROXICODONE) 5 MG tablet Take 1 tablet (5 mg) by mouth every 6 hours as needed     No current facility-administered medications for this visit.               Review of Systems:   A comprehensive 10 point review of systems (constitutional, ENT, cardiac, peripheral vascular, lymphatic, respiratory, GI, , Musculoskeletal, skin, Neurological) was performed and found to be negative except as described in this note.     See intake form completed by patient        Answers for HPI/ROS submitted by the patient  on 6/25/2020   General Symptoms: Yes  Skin Symptoms: No  HENT Symptoms: No  EYE SYMPTOMS: Yes  HEART SYMPTOMS: No  LUNG SYMPTOMS: No  INTESTINAL SYMPTOMS: No  URINARY SYMPTOMS: No  GYNECOLOGIC SYMPTOMS: No  BREAST SYMPTOMS: No  SKELETAL SYMPTOMS: Yes  BLOOD SYMPTOMS: Yes  NERVOUS SYSTEM SYMPTOMS: No  MENTAL HEALTH SYMPTOMS: No  Fever: No  Loss of appetite: No  Weight loss: No  Weight gain: Yes  Fatigue: No  Night sweats: No  Chills: No  Increased stress: No  Excessive hunger: No  Excessive thirst: No  Feeling hot or cold when others believe the temperature is normal: No  Loss of height: No  Post-operative complications: No  Surgical site pain: No  Hallucinations: No  Change in or Loss of Energy: No  Hyperactivity: No  Confusion: No  Eye pain: No  Vision loss: No  Dry eyes: No  Watery eyes: No  Eye bulging: No  Double vision: No  Flashing of lights: No  Spots: No  Floaters: Yes  Redness: No  Crossed eyes: No  Tunnel Vision: No  Yellowing of eyes: No  Eye irritation: No  Back pain: No  Muscle aches: No  Neck pain: No  Swollen joints: Yes  Joint pain: Yes  Bone pain: Yes  Muscle cramps: No  Muscle weakness: No  Joint stiffness: Yes  Bone fracture: No  Anemia: No  Swollen glands: No  Easy bleeding or bruising: No  Edema or swelling: Yes    Again, thank you for allowing me to participate in the care of your patient.      Sincerely,      Mario Wallace MD

## 2020-06-25 NOTE — NURSING NOTE
Chief Complaint   Patient presents with     RECHECK     Pt. states that she is here today to F/u on Bilat. Knee Pain with new XR.        60 year old  1960    [place], [place] - [address] [place] [address] [phone]    Allergies   Allergen Reactions     Ciprofloxacin Dizziness and Nausea     Food      Fruits with cores and pits  Apples     Sulfa Drugs Unknown     Swollen joints     Furosemide Rash       Current Outpatient Medications   Medication     aspirin (ASA) 81 MG tablet     Cholecalciferol (VITAMIN D-3) 25 MCG (1000 UT) CAPS     cycloSPORINE modified (GENERIC EQUIVALENT) 100 MG capsule     famotidine (PEPCID) 20 MG tablet     levothyroxine (SYNTHROID/LEVOTHROID) 125 MCG tablet     No current facility-administered medications for this visit.            Questionnaires:      KOOS Knee Survey Assessment    Knee Outcome Survey ADL Scale (NARCISA Trent; ALEJANDRO Vazquez; LUCÍA Pollard; Charlie, ARLEEN; ASHLEY Tucker; 1998) 6/25/2020   Do you have swelling in your knee? Often   Do you feel grinding, hear clicking or any other type of noise when your knee moves? Often   Does your knee catch or hang up when moving? Often   Can you straighten your knee fully? Never   Can you bend your knee fully? Always   How severe is your knee joint stiffness after first wakening in the morning? Extreme   How severe is your knee stiffness after sitting, lying or resting LATER IN THE DAY? Severe   How often do you experience knee pain? Daily   Twisting/pivoting on your knee Extreme   Straightening knee fully Severe   Bending knee fully Moderate   Walking on flat surface Moderate   Going up or down stairs Severe   At night while in bed Moderate   Sitting or lying Moderate   Standing upright Moderate   Descending stairs Extreme   Ascending stairs Severe   Rising from sitting Severe   Standing Moderate   Bending to floor/ an object Severe   Walking on flat surface Severe   Getting in/out of car Severe   Going  shopping Extreme   Putting on socks/stockings Severe   Rising from bed Severe   Taking off socks/stockings Severe   Lying in bed (turning over, maintaining knee position) Severe   Getting in/out of bath None   Sitting Moderate   Getting on/off toilet Severe   Heavy domestic duties (moving heavy boxes, scrubbing floors, etc) Extreme   Light domestic duties (cooking, dusting, etc) Severe   Squatting None   Running None   Jumping None   Twisting/pivoting on your injured knee Extreme   Kneeling None   How often are you aware of your knee problem? Constantly   Have you modified your life style to avoid potentially damaging activities to your knee? Totally   How much are you troubled with lack of confidence in your knee? Totally   In general, how much difficulty do you have with your knee? Extreme   Pain Score 36.11   Symptoms Score 28.57   Function, Daily Living Score 27.94   Sports and Rec Score 80   Quality of Life Score 0              Promis 10 Assessment    PROMIS 10 6/25/2020   In general, would you say your health is: Good   In general, would you say your quality of life is: Fair   In general, how would you rate your physical health? Poor   In general, how would you rate your mental health, including your mood and your ability to think? Fair   In general, how would you rate your satisfaction with your social activities and relationships? Excellent   In general, please rate how well you carry out your usual social activities and roles Good   To what extent are you able to carry out your everyday physical activities such as walking, climbing stairs, carrying groceries, or moving a chair? A little   How often have you been bothered by emotional problems such as feeling anxious, depressed or irritable? Often   How would you rate your fatigue on average? Mild   How would you rate your pain on average?   0 = No Pain  to  10 = Worst Imaginable Pain 8   In general, would you say your health is: 3   In general, would you  say your quality of life is: 2   In general, how would you rate your physical health? 1   In general, how would you rate your mental health, including your mood and your ability to think? 2   In general, how would you rate your satisfaction with your social activities and relationships? 5   In general, please rate how well you carry out your usual social activities and roles. (This includes activities at home, at work and in your community, and responsibilities as a parent, child, spouse, employee, friend, etc.) 3   To what extent are you able to carry out your everyday physical activities such as walking, climbing stairs, carrying groceries, or moving a chair? 2   In the past 7 days, how often have you been bothered by emotional problems such as feeling anxious, depressed, or irritable? 4   In the past 7 days, how would you rate your fatigue on average? 2   In the past 7 days, how would you rate your pain on average, where 0 means no pain, and 10 means worst imaginable pain? 8   Global Mental Health Score 11   Global Physical Health Score 9   PROMIS TOTAL - SUBSCORES 20   Some recent data might be hidden

## 2020-06-26 ENCOUNTER — PREP FOR PROCEDURE (OUTPATIENT)
Dept: ORTHOPEDICS | Facility: CLINIC | Age: 60
End: 2020-06-26

## 2020-06-26 DIAGNOSIS — M25.561 CHRONIC PAIN OF BOTH KNEES: Primary | ICD-10-CM

## 2020-06-26 DIAGNOSIS — G89.29 CHRONIC PAIN OF BOTH KNEES: Primary | ICD-10-CM

## 2020-06-26 DIAGNOSIS — M25.562 CHRONIC PAIN OF BOTH KNEES: Primary | ICD-10-CM

## 2020-06-29 ENCOUNTER — TELEPHONE (OUTPATIENT)
Dept: TRANSPLANT | Facility: CLINIC | Age: 60
End: 2020-06-29

## 2020-06-29 DIAGNOSIS — Z94.4 STATUS POST LIVER TRANSPLANTATION (H): Primary | ICD-10-CM

## 2020-06-29 NOTE — TELEPHONE ENCOUNTER
Pt called to ask if appropriate for her to report that jury duty. Patient is worried due to COVID. Message to DR Leventhal.   Juror # [id number]. Fax number if [phone] if patient can have letter.    Patient has a spot on leg that is concerning. Patient will be referred for derm for transplant skin check.

## 2020-06-29 NOTE — LETTER
June 30, 2020      From: [facility]  Solid Organ Transplant Services      To Whom It May Concern:     Nicci Pemberton (Juror #[id number]) underwent a liver transplant and is required to take immunosuppressive medication.  As such she currently fits into a  high risk  category for COVID-19.     Please excuse Nicci from jury duty; she is at increased risk for more severe disease with COVID-19.  We recommend that all reasonable accommodations be made to protect Nicci's health.     If you have any questions or concerns about the above request, please contact the Solid Organ Transplant Office at [phone].      Thank you for your partnership.    Sincerely,     Thomas M. Leventhal, M.D.   of Medicine  Advanced & Transplant Hepatology  [facility]

## 2020-07-03 NOTE — TELEPHONE ENCOUNTER
FUTURE VISIT INFORMATION      FUTURE VISIT INFORMATION:    Date: 7/9/2020    Time: 1 PM     Location: [facility]   REFERRAL INFORMATION:    Referring provider:  Dr. Doug Reyes     Referring providers clinic:  [facility]     Reason for visit/diagnosis  TSC, area of concern     RECORDS REQUESTED FROM:       Clinic name Comments Records Status Imaging Status   [facility] 4/16/2020, 1/23/2020 Office visit with Dr. Leventhal Epic

## 2020-07-08 ENCOUNTER — MYC MEDICAL ADVICE (OUTPATIENT)
Dept: ALLERGY | Facility: CLINIC | Age: 60
End: 2020-07-08

## 2020-07-09 ENCOUNTER — MEDICAL CORRESPONDENCE (OUTPATIENT)
Dept: HEALTH INFORMATION MANAGEMENT | Facility: CLINIC | Age: 60
End: 2020-07-09

## 2020-07-09 ENCOUNTER — PRE VISIT (OUTPATIENT)
Dept: DERMATOLOGY | Facility: CLINIC | Age: 60
End: 2020-07-09

## 2020-07-09 ENCOUNTER — VIRTUAL VISIT (OUTPATIENT)
Dept: DERMATOLOGY | Facility: CLINIC | Age: 60
End: 2020-07-09
Payer: COMMERCIAL

## 2020-07-09 DIAGNOSIS — D22.9 BENIGN NEVUS: ICD-10-CM

## 2020-07-09 DIAGNOSIS — D48.9 NEOPLASM OF UNCERTAIN BEHAVIOR: Primary | ICD-10-CM

## 2020-07-09 DIAGNOSIS — Z94.4 HISTORY OF LIVER TRANSPLANT (H): ICD-10-CM

## 2020-07-09 DIAGNOSIS — R21 RASH: ICD-10-CM

## 2020-07-09 ASSESSMENT — PAIN SCALES - GENERAL: PAINLEVEL: EXTREME PAIN (8)
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
## 2020-07-09 NOTE — PATIENT INSTRUCTIONS
[facility] Teledermatology Visit    Thank you for allowing us to participate in your care. Your findings, instructions and follow-up plan are as follows:    So nice talking with you today!  I think the spot on your thigh is a benign scar spot called a dermatofibroma but I would like to check in person.  I would also like to check the dark spot on your calf and all your skin.    Keep up the good sun protection.  See you soon!    The ABCDEs of Melanoma  Asymmetry, Border (irregularity), Color (not uniform, changes in color), Diameter (greater than 6 mm which is about the size of a pencil eraser), and Evolving (any changes in preexisting moles)    Skin cancer can develop anywhere on the skin. Ask someone for help when checking your skin, especially in hard to see places. If you notice a mole different from others, or that changes, enlarges, itches, or bleeds (even if it is small), you should see a dermatologist.        When should I call my doctor?    If you are worsening or not improving, please, contact us or seek urgent care as noted below.     Who should I call with questions (adults)?    [facility] (adult and pediatric): [phone]     [facility] (adult): [phone]    For urgent needs outside of business hours call the [facility] at [phone] and ask for the dermatology resident on call    If this is a medical emergency and you are unable to reach an ER, Call [phone]      Who should I call with questions (pediatric)?  [facility]- Pediatric Dermatology  Dr. Carmelina Varner, Dr. Juanito Reynoso, Dr. Debby Devine, Bebe Nicolenhdeo, PA  Dr. Jenifer Sanchez, Dr. Nicolle Bella & Dr. Héctor Henry  Non Urgent  Nurse Triage Line; [phone]- Moriah and Tiffani W[facility] Care Coordinatoryoana Barrow (/Complex ) [phone]    If you need a prescription refill, please contact your  pharmacy. Refills are approved or denied by our Physicians during normal business hours, Monday through Fridays  Per office policy, refills will not be granted if you have not been seen within the past year (or sooner depending on your child's condition)    Scheduling Information:  Pediatric Appointment Scheduling and Call Center [phone]  Radiology Scheduling- [phone]  Sedation Unit Scheduling- [phone]  [place] Scheduling- [facility] [phone]; Pediatric Dermatology [phone]  Main  Services: [phone]  [place]: [phone]  [place]er: [phone]  Hmong/[place]ican/[place]: [phone]  Preadmission Nursing Department Fax Number: [phone] (Fax all pre-operative paperwork to this number)    For urgent matters arising during evenings, weekends, or holidays that cannot wait for normal business hours please call [phone] and ask for the Dermatology Resident On-Call to be paged.

## 2020-07-14 DIAGNOSIS — E03.9 MYXEDEMA HEART DISEASE: Primary | ICD-10-CM

## 2020-07-14 DIAGNOSIS — I51.9 MYXEDEMA HEART DISEASE: Primary | ICD-10-CM

## 2020-07-15 ENCOUNTER — MEDICAL CORRESPONDENCE (OUTPATIENT)
Dept: HEALTH INFORMATION MANAGEMENT | Facility: CLINIC | Age: 60
End: 2020-07-15

## 2020-07-28 DIAGNOSIS — E03.9 MYXEDEMA HEART DISEASE: ICD-10-CM

## 2020-07-28 DIAGNOSIS — Z79.899 LONG TERM CURRENT USE OF IMMUNOSUPPRESSIVE DRUG: ICD-10-CM

## 2020-07-28 DIAGNOSIS — I51.9 MYXEDEMA HEART DISEASE: ICD-10-CM

## 2020-07-28 DIAGNOSIS — Z94.4 LIVER REPLACED BY TRANSPLANT (H): ICD-10-CM

## 2020-07-28 LAB
ALBUMIN SERPL-MCNC: 3.4 G/DL (ref 3.4–5)
ALP SERPL-CCNC: 123 U/L (ref 40–150)
ALT SERPL W P-5'-P-CCNC: 22 U/L (ref 0–50)
ANION GAP SERPL CALCULATED.3IONS-SCNC: 5 MMOL/L (ref 3–14)
AST SERPL W P-5'-P-CCNC: 22 U/L (ref 0–45)
BILIRUB DIRECT SERPL-MCNC: 0.2 MG/DL (ref 0–0.2)
BILIRUB SERPL-MCNC: 1.2 MG/DL (ref 0.2–1.3)
BUN SERPL-MCNC: 41 MG/DL (ref 7–30)
CALCIUM SERPL-MCNC: 9.1 MG/DL (ref 8.5–10.1)
CHLORIDE SERPL-SCNC: 110 MMOL/L (ref 94–109)
CO2 SERPL-SCNC: 27 MMOL/L (ref 20–32)
CREAT SERPL-MCNC: 1 MG/DL (ref 0.52–1.04)
ERYTHROCYTE [DISTWIDTH] IN BLOOD BY AUTOMATED COUNT: 12 % (ref 10–15)
GFR SERPL CREATININE-BSD FRML MDRD: 61 ML/MIN/{1.73_M2}
GLUCOSE SERPL-MCNC: 76 MG/DL (ref 70–99)
HCT VFR BLD AUTO: 35.7 % (ref 35–47)
HGB BLD-MCNC: 11.9 G/DL (ref 11.7–15.7)
MAGNESIUM SERPL-MCNC: 2 MG/DL (ref 1.6–2.3)
MCH RBC QN AUTO: 32.6 PG (ref 26.5–33)
MCHC RBC AUTO-ENTMCNC: 33.3 G/DL (ref 31.5–36.5)
MCV RBC AUTO: 98 FL (ref 78–100)
PHOSPHATE SERPL-MCNC: 3.8 MG/DL (ref 2.5–4.5)
PLATELET # BLD AUTO: 140 10E9/L (ref 150–450)
POTASSIUM SERPL-SCNC: 4.5 MMOL/L (ref 3.4–5.3)
PROT SERPL-MCNC: 7.1 G/DL (ref 6.8–8.8)
RBC # BLD AUTO: 3.65 10E12/L (ref 3.8–5.2)
SODIUM SERPL-SCNC: 142 MMOL/L (ref 133–144)
TSH SERPL DL<=0.005 MIU/L-ACNC: 2.48 MU/L (ref 0.4–4)
WBC # BLD AUTO: 5.1 10E9/L (ref 4–11)

## 2020-07-28 PROCEDURE — 84443 ASSAY THYROID STIM HORMONE: CPT | Performed by: FAMILY MEDICINE

## 2020-07-28 PROCEDURE — 85027 COMPLETE CBC AUTOMATED: CPT | Performed by: TRANSPLANT SURGERY

## 2020-07-28 PROCEDURE — 80158 DRUG ASSAY CYCLOSPORINE: CPT | Performed by: TRANSPLANT SURGERY

## 2020-07-28 PROCEDURE — 80048 BASIC METABOLIC PNL TOTAL CA: CPT | Performed by: TRANSPLANT SURGERY

## 2020-07-28 PROCEDURE — 83735 ASSAY OF MAGNESIUM: CPT | Performed by: TRANSPLANT SURGERY

## 2020-07-28 PROCEDURE — 36415 COLL VENOUS BLD VENIPUNCTURE: CPT | Performed by: FAMILY MEDICINE

## 2020-07-28 PROCEDURE — 84100 ASSAY OF PHOSPHORUS: CPT | Performed by: TRANSPLANT SURGERY

## 2020-07-28 PROCEDURE — 80076 HEPATIC FUNCTION PANEL: CPT | Performed by: TRANSPLANT SURGERY

## 2020-07-29 LAB
CYCLOSPORINE BLD LC/MS/MS-MCNC: 148 UG/L (ref 50–400)
TME LAST DOSE: 2359 H

## 2020-07-30 ENCOUNTER — LAB (OUTPATIENT)
Dept: LAB | Facility: CLINIC | Age: 60
End: 2020-07-30

## 2020-07-30 ENCOUNTER — MEDICAL CORRESPONDENCE (OUTPATIENT)
Dept: HEALTH INFORMATION MANAGEMENT | Facility: CLINIC | Age: 60
End: 2020-07-30

## 2020-07-30 DIAGNOSIS — Z94.4 LIVER REPLACED BY TRANSPLANT (H): Primary | ICD-10-CM

## 2020-08-19 DIAGNOSIS — Z94.4 LIVER REPLACED BY TRANSPLANT (H): ICD-10-CM

## 2020-08-19 DIAGNOSIS — Z13.220 LIPID SCREENING: ICD-10-CM

## 2020-08-24 ENCOUNTER — MEDICAL CORRESPONDENCE (OUTPATIENT)
Dept: HEALTH INFORMATION MANAGEMENT | Facility: CLINIC | Age: 60
End: 2020-08-24

## 2020-08-24 DIAGNOSIS — E55.9 AVITAMINOSIS D: Primary | ICD-10-CM

## 2020-08-26 ENCOUNTER — TELEPHONE (OUTPATIENT)
Dept: ORTHOPEDICS | Facility: CLINIC | Age: 60
End: 2020-08-26

## 2020-08-26 DIAGNOSIS — Z11.59 ENCOUNTER FOR SCREENING FOR OTHER VIRAL DISEASES: Primary | ICD-10-CM

## 2020-08-26 NOTE — TELEPHONE ENCOUNTER
Patient is scheduled for surgery with Dr. Wallace    Spoke or left message with: Patient    Date of Surgery: 10/2/20    Location: [place]    Post op: 4 weeks with MD    Pre-op with surgeon (if applicable): Complete    H&P: Patient will schedule with PCP    Additional imaging/appointments: Following up with transplant team 8/27/20    Surgery packet: Mailed to patient's home today     Additional comments: Patient aware she will be contacted to set up COVID test to be done within 4 days of surgery

## 2020-08-27 ENCOUNTER — ANCILLARY PROCEDURE (OUTPATIENT)
Dept: MAMMOGRAPHY | Facility: CLINIC | Age: 60
End: 2020-08-27
Attending: FAMILY MEDICINE
Payer: COMMERCIAL

## 2020-08-27 ENCOUNTER — OFFICE VISIT (OUTPATIENT)
Dept: GASTROENTEROLOGY | Facility: CLINIC | Age: 60
End: 2020-08-27
Attending: INTERNAL MEDICINE
Payer: COMMERCIAL

## 2020-08-27 ENCOUNTER — OFFICE VISIT (OUTPATIENT)
Dept: DERMATOLOGY | Facility: CLINIC | Age: 60
End: 2020-08-27
Payer: COMMERCIAL

## 2020-08-27 VITALS
BODY MASS INDEX: 36.58 KG/M2 | OXYGEN SATURATION: 98 % | SYSTOLIC BLOOD PRESSURE: 122 MMHG | HEART RATE: 76 BPM | DIASTOLIC BLOOD PRESSURE: 85 MMHG | TEMPERATURE: 98.4 F | WEIGHT: 200 LBS

## 2020-08-27 DIAGNOSIS — M25.561 CHRONIC PAIN OF BOTH KNEES: ICD-10-CM

## 2020-08-27 DIAGNOSIS — E55.9 AVITAMINOSIS D: ICD-10-CM

## 2020-08-27 DIAGNOSIS — Z12.31 VISIT FOR SCREENING MAMMOGRAM: ICD-10-CM

## 2020-08-27 DIAGNOSIS — Z13.220 LIPID SCREENING: ICD-10-CM

## 2020-08-27 DIAGNOSIS — D84.9 IMMUNOSUPPRESSED STATUS (H): Primary | Chronic | ICD-10-CM

## 2020-08-27 DIAGNOSIS — Z94.4 STATUS POST LIVER TRANSPLANTATION (H): Chronic | ICD-10-CM

## 2020-08-27 DIAGNOSIS — Z94.4 HISTORY OF LIVER TRANSPLANT (H): ICD-10-CM

## 2020-08-27 DIAGNOSIS — D22.9 MULTIPLE BENIGN NEVI: ICD-10-CM

## 2020-08-27 DIAGNOSIS — D23.9 DERMATOFIBROMA: Primary | ICD-10-CM

## 2020-08-27 DIAGNOSIS — M25.562 CHRONIC PAIN OF BOTH KNEES: ICD-10-CM

## 2020-08-27 DIAGNOSIS — G89.29 CHRONIC PAIN OF BOTH KNEES: ICD-10-CM

## 2020-08-27 DIAGNOSIS — Z94.4 LIVER REPLACED BY TRANSPLANT (H): ICD-10-CM

## 2020-08-27 DIAGNOSIS — Z48.298 CARE AFTER ORGAN TRANSPLANT: ICD-10-CM

## 2020-08-27 LAB
ALBUMIN SERPL-MCNC: 3.7 G/DL (ref 3.4–5)
ALBUMIN UR-MCNC: NEGATIVE MG/DL
ALP SERPL-CCNC: 141 U/L (ref 40–150)
ALT SERPL W P-5'-P-CCNC: 26 U/L (ref 0–50)
ANION GAP SERPL CALCULATED.3IONS-SCNC: 3 MMOL/L (ref 3–14)
APPEARANCE UR: CLEAR
AST SERPL W P-5'-P-CCNC: 25 U/L (ref 0–45)
BILIRUB DIRECT SERPL-MCNC: 0.3 MG/DL (ref 0–0.2)
BILIRUB SERPL-MCNC: 1.6 MG/DL (ref 0.2–1.3)
BILIRUB UR QL STRIP: NEGATIVE
BUN SERPL-MCNC: 31 MG/DL (ref 7–30)
CALCIUM SERPL-MCNC: 9.5 MG/DL (ref 8.5–10.1)
CHLORIDE SERPL-SCNC: 110 MMOL/L (ref 94–109)
CHOLEST SERPL-MCNC: 231 MG/DL
CO2 SERPL-SCNC: 28 MMOL/L (ref 20–32)
COLOR UR AUTO: YELLOW
CREAT SERPL-MCNC: 1.16 MG/DL (ref 0.52–1.04)
CREAT UR-MCNC: 79 MG/DL
CYCLOSPORINE BLD LC/MS/MS-MCNC: 172 UG/L (ref 50–400)
DEPRECATED CALCIDIOL+CALCIFEROL SERPL-MC: 41 UG/L (ref 20–75)
ERYTHROCYTE [DISTWIDTH] IN BLOOD BY AUTOMATED COUNT: 12.1 % (ref 10–15)
GFR SERPL CREATININE-BSD FRML MDRD: 51 ML/MIN/{1.73_M2}
GLUCOSE SERPL-MCNC: 89 MG/DL (ref 70–99)
GLUCOSE UR STRIP-MCNC: NEGATIVE MG/DL
HCT VFR BLD AUTO: 37.8 % (ref 35–47)
HDLC SERPL-MCNC: 57 MG/DL
HGB BLD-MCNC: 12.8 G/DL (ref 11.7–15.7)
HGB UR QL STRIP: NEGATIVE
KETONES UR STRIP-MCNC: NEGATIVE MG/DL
LDLC SERPL CALC-MCNC: 152 MG/DL
LEUKOCYTE ESTERASE UR QL STRIP: ABNORMAL
MCH RBC QN AUTO: 33.4 PG (ref 26.5–33)
MCHC RBC AUTO-ENTMCNC: 33.9 G/DL (ref 31.5–36.5)
MCV RBC AUTO: 99 FL (ref 78–100)
MIXED CELL CASTS #/AREA URNS LPF: 3 /LPF
MUCOUS THREADS #/AREA URNS LPF: PRESENT /LPF
NITRATE UR QL: NEGATIVE
NONHDLC SERPL-MCNC: 173 MG/DL
PH UR STRIP: 5 PH (ref 5–7)
PLATELET # BLD AUTO: 153 10E9/L (ref 150–450)
POTASSIUM SERPL-SCNC: 4.4 MMOL/L (ref 3.4–5.3)
PROT SERPL-MCNC: 7.7 G/DL (ref 6.8–8.8)
PROT UR-MCNC: 0.11 G/L
PROT/CREAT 24H UR: 0.14 G/G CR (ref 0–0.2)
RBC # BLD AUTO: 3.83 10E12/L (ref 3.8–5.2)
RBC #/AREA URNS AUTO: 2 /HPF (ref 0–2)
SODIUM SERPL-SCNC: 141 MMOL/L (ref 133–144)
SOURCE: ABNORMAL
SP GR UR STRIP: 1.01 (ref 1–1.03)
SQUAMOUS #/AREA URNS AUTO: <1 /HPF (ref 0–1)
TME LAST DOSE: NORMAL H
TRIGL SERPL-MCNC: 108 MG/DL
UROBILINOGEN UR STRIP-MCNC: 0 MG/DL (ref 0–2)
WBC # BLD AUTO: 4.9 10E9/L (ref 4–11)
WBC #/AREA URNS AUTO: 3 /HPF (ref 0–5)

## 2020-08-27 PROCEDURE — 36415 COLL VENOUS BLD VENIPUNCTURE: CPT | Performed by: FAMILY MEDICINE

## 2020-08-27 PROCEDURE — 90472 IMMUNIZATION ADMIN EACH ADD: CPT | Mod: ZF

## 2020-08-27 PROCEDURE — G0009 ADMIN PNEUMOCOCCAL VACCINE: HCPCS | Mod: ZF

## 2020-08-27 PROCEDURE — 85027 COMPLETE CBC AUTOMATED: CPT | Performed by: FAMILY MEDICINE

## 2020-08-27 PROCEDURE — 80076 HEPATIC FUNCTION PANEL: CPT | Performed by: FAMILY MEDICINE

## 2020-08-27 PROCEDURE — 82306 VITAMIN D 25 HYDROXY: CPT | Performed by: FAMILY MEDICINE

## 2020-08-27 PROCEDURE — 90750 HZV VACC RECOMBINANT IM: CPT | Mod: ZF | Performed by: INTERNAL MEDICINE

## 2020-08-27 PROCEDURE — 80048 BASIC METABOLIC PNL TOTAL CA: CPT | Performed by: FAMILY MEDICINE

## 2020-08-27 PROCEDURE — 80061 LIPID PANEL: CPT | Performed by: FAMILY MEDICINE

## 2020-08-27 PROCEDURE — 80158 DRUG ASSAY CYCLOSPORINE: CPT | Performed by: INTERNAL MEDICINE

## 2020-08-27 PROCEDURE — 86706 HEP B SURFACE ANTIBODY: CPT | Performed by: FAMILY MEDICINE

## 2020-08-27 PROCEDURE — 81001 URINALYSIS AUTO W/SCOPE: CPT | Performed by: INTERNAL MEDICINE

## 2020-08-27 PROCEDURE — 25000581 ZZH RX MED A9270 GY (STAT IND- M) 250: Mod: ZF | Performed by: INTERNAL MEDICINE

## 2020-08-27 PROCEDURE — 25000128 H RX IP 250 OP 636: Mod: ZF | Performed by: INTERNAL MEDICINE

## 2020-08-27 PROCEDURE — 90732 PPSV23 VACC 2 YRS+ SUBQ/IM: CPT | Mod: ZF | Performed by: INTERNAL MEDICINE

## 2020-08-27 PROCEDURE — 84156 ASSAY OF PROTEIN URINE: CPT | Performed by: INTERNAL MEDICINE

## 2020-08-27 RX ADMIN — ZOSTER VACCINE RECOMBINANT, ADJUVANTED 0.5 ML: KIT at 11:46

## 2020-08-27 RX ADMIN — PNEUMOCOCCAL VACCINE POLYVALENT 0.5 ML
25; 25; 25; 25; 25; 25; 25; 25; 25; 25; 25; 25; 25; 25; 25; 25; 25; 25; 25; 25; 25; 25; 25 INJECTION, SOLUTION INTRAMUSCULAR; SUBCUTANEOUS at 11:45

## 2020-08-27 ASSESSMENT — PAIN SCALES - GENERAL
PAINLEVEL: NO PAIN (0)
PAINLEVEL: NO PAIN (0)

## 2020-08-27 NOTE — LETTER
2020         RE: Nicci [facility]ember[facility]  [address] [facility] [address]      Reason for Visit: post transplant Follow up    Date of Encounter: 2020     Additional provider notes:  Nicci Pemberton is a 60 year old female with past medical history of obesity, lymphedema, and cirrhosis secondary to combination of nonalcoholic fatty liver disease, iron overload, and alpha-1 antitrypsin MZ phenotype who is s/p  donor liver transplant on 19 and presents in hepatology.      Her post operative course was complicated by delerium, and was transitioned to cyclosporin from tacrolimus.     She developed abnormal liver tests in January and ERCP on February 10 which demonstrated a high-grade stricture at the area of the biliary anastomosis.  One biliary stent was placed at that time and she has had subsequent resolution of the stricture.     Her big concern today is the her impaired mobility secondary to her osteoarthritis of her knees.  She is scheduled for left total knee arthroplasty on  at the [facility].  She is very excited about this as she has profoundly limited ability to ambulate, often now requiring a walker.  She feels significant depression and depressed mood as she is unable to help her  with routine activities.  [place] within the [place] and move [place] there permanently, so she has been doing some planning in regards to this.    We talked today about her recent interaction with the donor family, and she reports that it took her a long time to feel comfortable in this interaction, but reports it was an excellent experience with plans to talk to them again        Surgical Course  - OLT date: 2019   - etiology: ANGULO/alpha 1  - donor: type DBD  - Induction IS: basiliximab  - CMV status D+/R+  - operation: Surgical technique caval replacement, biliary anastomosis: duct to duct  - CiT  335min, WiT 35min  - Episodes of Rejection: none  - Biliary complications:  Severe anastomotic stricture noted February 2020, repeat stenting March 2020  - Other complications of immediate post-operative course: delerium with d/c of tacrolimus and start of cyclosporin    Medical history, surgical history, social history, family history, and medications all reviewed and updated    Review of systems was otherwise negative more specifically commented upon in the HPI    Physical Exam  - vitals reviewed  Gen: NAD  HEENT: anicteric  CV: RRR  Lungs: Normal respiratory excursion   Abd: + BS,  Ext: warm, no edema  Neuro: A&Ox3  Skin: no jaundice    Labs: Reviewed  Procedures: Reviewed    Assessment and Plan:     S/P Liver Transplantation:   - 8/18/2019 with DBD donor for ANGULO/A1AT/iron overload  - Bi-caval with duct to duct  - Excellent allograft function at this time  -Postoperative course complicated by anastomotic biliary stricture now resolved  - Post- transplant labs per routine     Immunosuppression:   - Was transitioned to cyclosporin from tacrolimus for concerns of delerium in the immediate post transplant period.  - Has been maintained with goal trough ~150; As she is 1+ year out from transplant, we will decrease goal trough dose to   - Will plan to check immunosuppression trough levels per protocol to assess for adequate target dosing and will assess CBC and kidney function for toxicity of medications     Osteoarthritis:  -At this time, there is no overt contraindication to her undergoing total knee arthroplasty in relationship to her history of liver transplantation  -Antibiotics and postoperative management per the orthopedic team  -As she is on a calcineurin inhibitor would minimize the use of NSAIDs as able     Kidney Health:   - She has preserved kidney function at this time     Nutrition/Weight:    It is very common for patients to put on significant weight after liver transplantation, as they become  conditioned to eating as much as possible to maintain a healthy weight pre-transplant.  There is a very significant risk of developing fatty liver and non-alcoholic steatohepatitis in post-transplant patients, even in those who were not transplanted for ANGULO cirrhosis.  - Please work on eating a diet that is rice in fresh fruits and vegetables, moderate amounts of lean protein and dairy, and modest amounts of carbohydrates and fats.  - An exercise plan/regimen is an essential part of remaining healthy in the post-transplant setting and we recommend 30-35 minutes of exercise at least 4 days a week     Routine Health Care:  - You need to establish and maintain care with a primary care physician to manage: hypertension, high cholesterol, abnormal blood sugars - as these are all potential complications of your health after liver transplantation and will need a local physician to manage these issues.  - All patients with liver diseaes (even post transplant) are at an increased risk for osteoporosis.  We strongly recommend screening for Vitamin D deficiency at least twice yearly with aggressive supplementation/replacement as indicated.    - We also recommend a screening DEXA scan to evaluate for osteoporosis.  If present, should treat with calcium, Vitamin D supplementation, and recommend consideration of bisphosphonate therapy.  Also recommend follow up DEXA scans to evaluate for improvement of bone density on therapy.  - Recommend yearly skin exams with dermatology, and if skin cancers are found, recommend twice yearly exams  - Recommend you stay up to date for cancer screening: mammograms/PAP for women and prostate cancer screening for men, and colon cancer screening for all.  - Practice good hygiene with washing of hands and maintaining a clean living space as this will decrease your chances of developing an infection in the post-transplantation setting  - Eat a health diet: rich in fresh fruits and vegetables, lean  proteins, and minimize carbohydrate and fat intake.    Follow up: 6 months    Thomas M. Leventhal, M.D.   of Medicine  Advanced & Transplant Hepatology  [facility]      Thomas M. Leventhal, MD

## 2020-08-27 NOTE — PROGRESS NOTES
"[facility] Dermatology Note      Dermatology Problem List:  1. History of liver transplant 8/2019 2/2 A1AT.    CC:   Chief Complaint   Patient presents with     Derm Problem     Skin exam.          Encounter Date: Aug 27, 2020    History of Present Illness:  Ms. Nicci Pemberton is a 60 year old female who presents for in person appointment after prior teledermatology visit for spots of concern.    Patient underwent liver transplant in 8/2019 and reports doing well. She wanted to touch base today about several spots which her surgeon first noted on a routine physical examination (Dr. Marion).     Today, we rediscussed concerns/findigns from prior visit inclding the hyperpigmented \"fatty cyst\" which has been most recently changing in appearance over the last 5 years. After cauterization 25-30 yearas ago, it remained as a pinkish/papule until 2015 when it started to get lerger and darker. She denies any itchiness, bleeding or oozing of contents from it. Patient had her liver transplant for Alpha-1 Antitrypsin deficiency on 08/2019, and notes no other major changes in her skin since then. She is currently in the pre-operative process/preparation for eventual bilateral knee replacement.       She also noted a mole on her lower back has been there her whole life. Occasionally gets irritated, otherwise no other problems have arisen. No bleeding.    She's always had lymphedema but this has improved with the transplant.  She actually had a spot on her left leg that opened up and had fluid dripping. She reported it being a staphylococcal abscess which required drainage multiple times.  This has healed but she now has a really dark area here.  She does wear compression stockings.  She now has discolored spots on the back of her legs.  On the right calf, there's a very dark spot that feels flat. She's not sure how long the dark spot has been there.    No other painful, bleeding, non-healing, or otherwise " symptomatic lesions.   No personal history of skin cancer. Otherwise feeling well, no additional skin concerns.     Past Medical History:   Past Medical History:   Diagnosis Date     Anemia      Cellulitis      Cirrhosis of liver (H) 2018     Heterozygous alpha 1-antitrypsin deficiency (H)      High hepatic iron concentration determined by biopsy of liver      Liver cirrhosis secondary to ANGULO (H)      Liver transplanted (H) 2019     Lymphedema      Osteoarthritis     knees     SBP (spontaneous bacterial peritonitis) (H) 2019 in [place]     Past Surgical History:   Procedure Laterality Date     BENCH LIVER N/A 2019    Procedure: BACKBENCH PREPARATION, LIVER;  Surgeon: Mandeep Alford MD;  Location: [facility] OR     COLONOSCOPY N/A 2018    Procedure: COLONOSCOPY;  colonoscopy;  Surgeon: Hugo Patel MD;  [facility]     ENDOSCOPIC RETROGRADE CHOLANGIOPANCREATOGRAM N/A 2/10/2020    Procedure: ENDOSCOPIC RETROGRADE CHOLANGIOPANCREATOGRAPHY with spinchterotomy;  Surgeon: Guru Rigoberto Canada MD;  Location: [facility] OR     ENDOSCOPIC RETROGRADE CHOLANGIOPANCREATOGRAM N/A 2020    Procedure: ENDOSCOPIC RETROGRADE CHOLANGIOPANCREATOGRAPHY,Stent exchange and sludge removal;  Surgeon: Guru Rigoberto Canada MD;  Location: [facility] OR     ENDOSCOPIC RETROGRADE CHOLANGIOPANCREATOGRAPHY, EXCHANGE TUBE/STENT N/A 3/4/2020    Procedure: ENDOSCOPIC RETROGRADE CHOLANGIOPANCREATOGRAPHY, WITH biliary dilarion, stent exchange, stone removal;  Surgeon: Guru Rigoberto Canada MD;  Location: [facility] OR     ESOPHAGOSCOPY, GASTROSCOPY, DUODENOSCOPY (EGD), COMBINED N/A 10/31/2019    Procedure: Esophagogastroduodenoscopy, With Biopsy;  Surgeon: Nerissa Aranda MD;  Location: [facility] GI     IR FEEDING TUBE PLACEMENT MERCEDEZ PRESTON/MD  2019     IR FLUORO 0-1 HOUR  2019     IR LUMBAR PUNCTURE  2019     TRANSPLANT LIVER RECIPIENT  DONOR N/A 2019     Procedure: TRANSPLANT, LIVER, RECIPIENT,  DONOR;  Surgeon: Mandeep Alford MD;  Location:  OR       Social History:   reports that she quit smoking about 9 years ago. She smoked 0.00 packs per day. She has never used smokeless tobacco. She reports that she does not drink alcohol or use drugs.    Family History:  Family History   Problem Relation Age of Onset     Cirrhosis No family hx of      Liver Cancer No family hx of        Medications:  Current Outpatient Medications   Medication Sig Dispense Refill     aspirin (ASA) 81 MG tablet Take 1 tablet (81 mg) by mouth daily 90 tablet 3     Cholecalciferol (VITAMIN D-3) 25 MCG (1000 UT) CAPS Take 1,000 Units by mouth 2 times daily (Patient taking differently: Take by mouth daily Takes 2000 daily in AM) 60 capsule 1     cycloSPORINE modified (GENERIC EQUIVALENT) 100 MG capsule Take 1 capsule (100 mg) by mouth every 12 hours 180 capsule 3     levothyroxine (SYNTHROID/LEVOTHROID) 125 MCG tablet Take 125 mcg by mouth daily        famotidine (PEPCID) 20 MG tablet Take 1 tablet (20 mg) by mouth 2 times daily (Patient not taking: Reported on 2020) 60 tablet 3     Allergies   Allergen Reactions     Ciprofloxacin Dizziness and Nausea     Food      Fruits with cores and pits  Apples     Sulfa Drugs Unknown     Swollen joints     Furosemide Rash         Review of Systems:  - Constitutional: Otherwise in baseline state of health.  - Skin: As above in HPI. No additional skin concerns.    Physical exam:  GEN: This is a well developed, well-nourished female in no acute distress, in a pleasant mood.    SKIN: Huizar phototype II  - Total skin excluding the undergarment areas was performed. The exam included the head/face, neck, both arms, chest, back, abdomen, both legs, digits and/or nails.   - There are two firm tan/hyperpigmented flesh colored papule that dimple with lateral pressure    on the medial right thigh and central upper back.  - Multiple regular  brown pigmented macules and papules are identified on the trunk.   - There are waxy stuck on tan to brown papules on the legs.  -  Bilateral non-pitting edema on lower legs with background dyspigmentation and xerosis.  - No other lesions of concern on areas examined.       Impression/Plan:  1. Benign lesions: Dermatofibroma, seborrheic keratoses. Discussed the natural history and benign nature of this lesion. Reassurance provided that no additional treatment is necessary.     2. Multiple benign nevi. Counseled on ABCDEs of melanoma and sun protection. Asked patient to return sooner if noticing changing or symptomatic lesions.    3. History of liver transplant 8/2019. Counseled patient that she is at higher risk for cutaneous malignancy given history of transplant. Monitor for changing or symptomatic lesions. Continue annual skin checks and photoprotection.    4. Chronic lymphedema and mild stasis dermatitis. Asymptomatic.  - Recommended to continue compression stockings as she is able  - Advised daily moisturization with bland emollient.   - Offered topical steroid if pruritus develops.      CC Dr. Marion on close of this encounter.    Follow-up in 1 year, earlier for new or changing lesions.     Case discussed with attending physician, Marilynn Thomas MD    Dylon E. [facility] [place]  Internal Medicine Residency, PGY-2  [facility]   p. [phone]      Staff Physician Comments:   I saw and evaluated the patient with the resident and I agree with the assessment and plan.  I was present for the examination.    Marilynn Thomas MD    Department of Dermatology  [facility]: Phone: [phone], Fax: [phone]  8/28/2020

## 2020-08-27 NOTE — PROGRESS NOTES
Reason for Visit: post transplant Follow up    Date of Encounter: 2020     Additional provider notes:  Nicci Pemberton is a 60 year old female with past medical history of obesity, lymphedema, and cirrhosis secondary to combination of nonalcoholic fatty liver disease, iron overload, and alpha-1 antitrypsin MZ phenotype who is s/p  donor liver transplant on 19 and presents in hepatology.      Her post operative course was complicated by delerium, and was transitioned to cyclosporin from tacrolimus.     She developed abnormal liver tests in January and ERCP on February 10 which demonstrated a high-grade stricture at the area of the biliary anastomosis.  One biliary stent was placed at that time and she has had subsequent resolution of the stricture.     Her big concern today is the her impaired mobility secondary to her osteoarthritis of her knees.  She is scheduled for left total knee arthroplasty on  at the [place].  She is very excited about this as she has profoundly limited ability to ambulate, often now requiring a walker.  She feels significant depression and depressed mood as she is unable to help her  with routine activities.  [place] within the [place] and move up there permanently, so she has been doing some planning in regards to this.    We talked today about her recent interaction with the donor family, and she reports that it took her a long time to feel comfortable in this interaction, but reports it was an excellent experience with plans to talk to them again        Surgical Course  - OLT date: 2019   - etiology: ANGULO/alpha 1  - donor: type DBD  - Induction IS: basiliximab  - CMV status D+/R+  - operation: Surgical technique caval replacement, biliary anastomosis: duct to duct  - CiT 335min, WiT 35min  - Episodes of Rejection: none  - Biliary complications:  Severe  anastomotic stricture noted February 2020, repeat stenting March 2020  - Other complications of immediate post-operative course: delerium with d/c of tacrolimus and start of cyclosporin    Medical history, surgical history, social history, family history, and medications all reviewed and updated    Review of systems was otherwise negative more specifically commented upon in the HPI    Physical Exam  - vitals reviewed  Gen: NAD  HEENT: anicteric  CV: RRR  Lungs: Normal respiratory excursion   Abd: + BS,  Ext: warm, no edema  Neuro: A&Ox3  Skin: no jaundice    Labs: Reviewed  Procedures: Reviewed    Assessment and Plan:     S/P Liver Transplantation:   - 8/18/2019 with DBD donor for ANGULO/A1AT/iron overload  - Bi-caval with duct to duct  - Excellent allograft function at this time  -Postoperative course complicated by anastomotic biliary stricture now resolved  - Post- transplant labs per routine     Immunosuppression:   - Was transitioned to cyclosporin from tacrolimus for concerns of delerium in the immediate post transplant period.  - Has been maintained with goal trough ~150; As she is 1+ year out from transplant, we will decrease goal trough dose to   - Will plan to check immunosuppression trough levels per protocol to assess for adequate target dosing and will assess CBC and kidney function for toxicity of medications     Osteoarthritis:  -At this time, there is no overt contraindication to her undergoing total knee arthroplasty in relationship to her history of liver transplantation  -Antibiotics and postoperative management per the orthopedic team  -As she is on a calcineurin inhibitor would minimize the use of NSAIDs as able     Kidney Health:   - She has preserved kidney function at this time     Nutrition/Weight:    It is very common for patients to put on significant weight after liver transplantation, as they become conditioned to eating as much as possible to maintain a healthy weight  pre-transplant.  There is a very significant risk of developing fatty liver and non-alcoholic steatohepatitis in post-transplant patients, even in those who were not transplanted for ANGULO cirrhosis.  - Please work on eating a diet that is rice in fresh fruits and vegetables, moderate amounts of lean protein and dairy, and modest amounts of carbohydrates and fats.  - An exercise plan/regimen is an essential part of remaining healthy in the post-transplant setting and we recommend 30-35 minutes of exercise at least 4 days a week     Routine Health Care:  - You need to establish and maintain care with a primary care physician to manage: hypertension, high cholesterol, abnormal blood sugars - as these are all potential complications of your health after liver transplantation and will need a local physician to manage these issues.  - All patients with liver diseaes (even post transplant) are at an increased risk for osteoporosis.  We strongly recommend screening for Vitamin D deficiency at least twice yearly with aggressive supplementation/replacement as indicated.    - We also recommend a screening DEXA scan to evaluate for osteoporosis.  If present, should treat with calcium, Vitamin D supplementation, and recommend consideration of bisphosphonate therapy.  Also recommend follow up DEXA scans to evaluate for improvement of bone density on therapy.  - Recommend yearly skin exams with dermatology, and if skin cancers are found, recommend twice yearly exams  - Recommend you stay up to date for cancer screening: mammograms/PAP for women and prostate cancer screening for men, and colon cancer screening for all.  - Practice good hygiene with washing of hands and maintaining a clean living space as this will decrease your chances of developing an infection in the post-transplantation setting  - Eat a health diet: rich in fresh fruits and vegetables, lean proteins, and minimize carbohydrate and fat intake.    Follow up: 6  months    Thomas M. Leventhal, M.D.   of Medicine  Advanced & Transplant Hepatology  [facility]

## 2020-08-27 NOTE — LETTER
2020         RE: Nicci Pemberton  [address] [facility] [address]        Dear Colleague,    Thank you for referring your patient, Nicci Pemberton, to the [facility]PAT[facility]. Please see a copy of my visit note below.    Reason for Visit: post transplant Follow up    Date of Encounter: 2020     Additional provider notes:  Nicci Pemberton is a 60 year old female with past medical history of obesity, lymphedema, and cirrhosis secondary to combination of nonalcoholic fatty liver disease, iron overload, and alpha-1 antitrypsin MZ phenotype who is s/p  donor liver transplant on 19 and presents in hepatology.      Her post operative course was complicated by delerium, and was transitioned to cyclosporin from tacrolimus.     She developed abnormal liver tests in January and ERCP on February 10 which demonstrated a high-grade stricture at the area of the biliary anastomosis.  One biliary stent was placed at that time and she has had subsequent resolution of the stricture.     Her big concern today is the her impaired mobility secondary to her osteoarthritis of her knees.  She is scheduled for left total knee arthroplasty on  at the [place].  She is very excited about this as she has profoundly limited ability to ambulate, often now requiring a walker.  She feels significant depression and depressed mood as she is unable to help her  with routine activities.  [place] home within the [place] and move [place] there permanently, so she has been doing some planning in regards to this.    We talked today about her recent interaction with the donor family, and she reports that it took her a long time to feel comfortable in this interaction, but reports it was an excellent experience with plans to talk to them again        Surgical Course  - OLT date: 2019   - etiology: ANGULO/alpha 1  - donor:  type DBD  - Induction IS: basiliximab  - CMV status D+/R+  - operation: Surgical technique caval replacement, biliary anastomosis: duct to duct  - CiT 335min, WiT 35min  - Episodes of Rejection: none  - Biliary complications:  Severe anastomotic stricture noted February 2020, repeat stenting March 2020  - Other complications of immediate post-operative course: delerium with d/c of tacrolimus and start of cyclosporin    Medical history, surgical history, social history, family history, and medications all reviewed and updated    Review of systems was otherwise negative more specifically commented upon in the HPI    Physical Exam  - vitals reviewed  Gen: NAD  HEENT: anicteric  CV: RRR  Lungs: Normal respiratory excursion   Abd: + BS,  Ext: warm, no edema  Neuro: A&Ox3  Skin: no jaundice    Labs: Reviewed  Procedures: Reviewed    Assessment and Plan:     S/P Liver Transplantation:   - 8/18/2019 with DBD donor for ANGULO/A1AT/iron overload  - Bi-caval with duct to duct  - Excellent allograft function at this time  -Postoperative course complicated by anastomotic biliary stricture now resolved  - Post- transplant labs per routine     Immunosuppression:   - Was transitioned to cyclosporin from tacrolimus for concerns of delerium in the immediate post transplant period.  - Has been maintained with goal trough ~150; As she is 1+ year out from transplant, we will decrease goal trough dose to   - Will plan to check immunosuppression trough levels per protocol to assess for adequate target dosing and will assess CBC and kidney function for toxicity of medications     Osteoarthritis:  -At this time, there is no overt contraindication to her undergoing total knee arthroplasty in relationship to her history of liver transplantation  -Antibiotics and postoperative management per the orthopedic team  -As she is on a calcineurin inhibitor would minimize the use of NSAIDs as able     Kidney Health:   - She has preserved kidney  function at this time     Nutrition/Weight:    It is very common for patients to put on significant weight after liver transplantation, as they become conditioned to eating as much as possible to maintain a healthy weight pre-transplant.  There is a very significant risk of developing fatty liver and non-alcoholic steatohepatitis in post-transplant patients, even in those who were not transplanted for ANGULO cirrhosis.  - Please work on eating a diet that is rice in fresh fruits and vegetables, moderate amounts of lean protein and dairy, and modest amounts of carbohydrates and fats.  - An exercise plan/regimen is an essential part of remaining healthy in the post-transplant setting and we recommend 30-35 minutes of exercise at least 4 days a week     Routine Health Care:  - You need to establish and maintain care with a primary care physician to manage: hypertension, high cholesterol, abnormal blood sugars - as these are all potential complications of your health after liver transplantation and will need a local physician to manage these issues.  - All patients with liver diseaes (even post transplant) are at an increased risk for osteoporosis.  We strongly recommend screening for Vitamin D deficiency at least twice yearly with aggressive supplementation/replacement as indicated.    - We also recommend a screening DEXA scan to evaluate for osteoporosis.  If present, should treat with calcium, Vitamin D supplementation, and recommend consideration of bisphosphonate therapy.  Also recommend follow up DEXA scans to evaluate for improvement of bone density on therapy.  - Recommend yearly skin exams with dermatology, and if skin cancers are found, recommend twice yearly exams  - Recommend you stay up to date for cancer screening: mammograms/PAP for women and prostate cancer screening for men, and colon cancer screening for all.  - Practice good hygiene with washing of hands and maintaining a clean living space as this will  decrease your chances of developing an infection in the post-transplantation setting  - Eat a health diet: rich in fresh fruits and vegetables, lean proteins, and minimize carbohydrate and fat intake.    Follow up: 6 months    Thomas M. Leventhal, M.D.   of Medicine  Advanced & Transplant Hepatology  [facility]    Again, thank you for allowing me to participate in the care of your patient.        Sincerely,        Thomas M. Leventhal, MD

## 2020-08-27 NOTE — PATIENT INSTRUCTIONS
Skin looks great today!    Scar spots = dermatofibromas    Benign keratoses on legs    Keep up good work with sun protection!  Return in 1 year, sooner if concerns.    The ABCDEs of Melanoma  Asymmetry, Border (irregularity), Color (not uniform, changes in color), Diameter (greater than 6 mm which is about the size of a pencil eraser), and Evolving (any changes in preexisting moles)    Skin cancer can develop anywhere on the skin. Ask someone for help when checking your skin, especially in hard to see places. If you notice a mole different from others, or that changes, enlarges, itches, or bleeds (even if it is small), you should see a dermatologist.

## 2020-08-27 NOTE — NURSING NOTE
Chief Complaint   Patient presents with     RECHECK     follow uppppp     /85 (BP Location: Right arm, Patient Position: Sitting, Cuff Size: Adult Regular)   Pulse 76   Temp 98.4  F (36.9  C)   Wt 90.7 kg (200 lb)   SpO2 98%   BMI 36.58 kg/m        Geovany Sanches, EMT

## 2020-08-27 NOTE — LETTER
"8/27/2020       RE: Nicci Pemberton  [address] [facility] [address]     Dear Colleague,    Thank you for referring your patient, Nicci Pemberton, to the [facility] DERMATOLOGY at [facility]. Please see a copy of my visit note below.    [facility] Dermatology Note      Dermatology Problem List:  1. History of liver transplant 8/2019 2/2 A1AT.    CC:   Chief Complaint   Patient presents with     Derm Problem     Skin exam.          Encounter Date: Aug 27, 2020    History of Present Illness:  Ms. Nicci Pemberton is a 60 year old female who presents for in person appointment after prior teledermatology visit for spots of concern.    Patient underwent liver transplant in 8/2019 and reports doing well. She wanted to touch base today about several spots which her surgeon first noted on a routine physical examination (Dr. Marion).     Today, we rediscussed concerns/findigns from prior visit inclding the hyperpigmented \"fatty cyst\" which has been most recently changing in appearance over the last 5 years. After cauterization 25-30 yearas ago, it remained as a pinkish/papule until 2015 when it started to get lerger and darker. She denies any itchiness, bleeding or oozing of contents from it. Patient had her liver transplant for Alpha-1 Antitrypsin deficiency on 08/2019, and notes no other major changes in her skin since then. She is currently in the pre-operative process/preparation for eventual bilateral knee replacement.       She also noted a mole on her lower back has been there her whole life. Occasionally gets irritated, otherwise no other problems have arisen. No bleeding.    She's always had lymphedema but this has improved with the transplant.  She actually had a spot on her left leg that opened up and had fluid dripping. She reported it being a staphylococcal abscess which required drainage multiple times.  This has healed but she now has a really dark " area here.  She does wear compression stockings.  She now has discolored spots on the back of her legs.  On the right calf, there's a very dark spot that feels flat. She's not sure how long the dark spot has been there.    No other painful, bleeding, non-healing, or otherwise symptomatic lesions.   No personal history of skin cancer. Otherwise feeling well, no additional skin concerns.     Past Medical History:   Past Medical History:   Diagnosis Date     Anemia      Cellulitis      Cirrhosis of liver (H) 6/18/2018     Heterozygous alpha 1-antitrypsin deficiency (H)      High hepatic iron concentration determined by biopsy of liver      Liver cirrhosis secondary to ANGULO (H)      Liver transplanted (H) 08/18/2019     Lymphedema      Osteoarthritis     knees     SBP (spontaneous bacterial peritonitis) (H) 8/2/2019 8/1/19 in [facility]     Past Surgical History:   Procedure Laterality Date     BENCH LIVER N/A 8/18/2019    Procedure: BACKBENCH PREPARATION, LIVER;  Surgeon: Mandeep Alford MD;  Location: [facility] OR     COLONOSCOPY N/A 6/22/2018    Procedure: COLONOSCOPY;  colonoscopy;  Surgeon: Hugo Patel MD;  [facility]     ENDOSCOPIC RETROGRADE CHOLANGIOPANCREATOGRAM N/A 2/10/2020    Procedure: ENDOSCOPIC RETROGRADE CHOLANGIOPANCREATOGRAPHY with spinchterotomy;  Surgeon: Guru Rigoberto Canada MD;  Location: [facility] OR     ENDOSCOPIC RETROGRADE CHOLANGIOPANCREATOGRAM N/A 5/13/2020    Procedure: ENDOSCOPIC RETROGRADE CHOLANGIOPANCREATOGRAPHY,Stent exchange and sludge removal;  Surgeon: Guru Rigoberto Canada MD;  Location: [facility] OR     ENDOSCOPIC RETROGRADE CHOLANGIOPANCREATOGRAPHY, EXCHANGE TUBE/STENT N/A 3/4/2020    Procedure: ENDOSCOPIC RETROGRADE CHOLANGIOPANCREATOGRAPHY, WITH biliary dilarion, stent exchange, stone removal;  Surgeon: Guru Rigoberto Canada MD;  Location: [facility] OR     ESOPHAGOSCOPY, GASTROSCOPY, DUODENOSCOPY (EGD), COMBINED N/A 10/31/2019    Procedure:  Esophagogastroduodenoscopy, With Biopsy;  Surgeon: Nerissa Aranda MD;  Location:  GI     IR FEEDING TUBE PLACEMENT W MOHAN/MD  2019     IR FLUORO 0-1 HOUR  2019     IR LUMBAR PUNCTURE  2019     TRANSPLANT LIVER RECIPIENT  DONOR N/A 2019    Procedure: TRANSPLANT, LIVER, RECIPIENT,  DONOR;  Surgeon: Mandeep Alford MD;  Location:  OR       Social History:   reports that she quit smoking about 9 years ago. She smoked 0.00 packs per day. She has never used smokeless tobacco. She reports that she does not drink alcohol or use drugs.    Family History:  Family History   Problem Relation Age of Onset     Cirrhosis No family hx of      Liver Cancer No family hx of        Medications:  Current Outpatient Medications   Medication Sig Dispense Refill     aspirin (ASA) 81 MG tablet Take 1 tablet (81 mg) by mouth daily 90 tablet 3     Cholecalciferol (VITAMIN D-3) 25 MCG (1000 UT) CAPS Take 1,000 Units by mouth 2 times daily (Patient taking differently: Take by mouth daily Takes 2000 daily in AM) 60 capsule 1     cycloSPORINE modified (GENERIC EQUIVALENT) 100 MG capsule Take 1 capsule (100 mg) by mouth every 12 hours 180 capsule 3     levothyroxine (SYNTHROID/LEVOTHROID) 125 MCG tablet Take 125 mcg by mouth daily        famotidine (PEPCID) 20 MG tablet Take 1 tablet (20 mg) by mouth 2 times daily (Patient not taking: Reported on 2020) 60 tablet 3     Allergies   Allergen Reactions     Ciprofloxacin Dizziness and Nausea     Food      Fruits with cores and pits  Apples     Sulfa Drugs Unknown     Swollen joints     Furosemide Rash         Review of Systems:  - Constitutional: Otherwise in baseline state of health.  - Skin: As above in HPI. No additional skin concerns.    Physical exam:  GEN: This is a well developed, well-nourished female in no acute distress, in a pleasant mood.    SKIN: Huizar phototype II  - Total skin excluding the undergarment areas was  performed. The exam included the head/face, neck, both arms, chest, back, abdomen, both legs, digits and/or nails.   - There are two firm tan/hyperpigmented flesh colored papule that dimple with lateral pressure    on the medial right thigh and central upper back.  - Multiple regular brown pigmented macules and papules are identified on the trunk.   - There are waxy stuck on tan to brown papules on the legs.  -  Bilateral non-pitting edema on lower legs with background dyspigmentation and xerosis.  - No other lesions of concern on areas examined.       Impression/Plan:  1. Benign lesions: Dermatofibroma, seborrheic keratoses. Discussed the natural history and benign nature of this lesion. Reassurance provided that no additional treatment is necessary.     2. Multiple benign nevi. Counseled on ABCDEs of melanoma and sun protection. Asked patient to return sooner if noticing changing or symptomatic lesions.    3. History of liver transplant 8/2019. Counseled patient that she is at higher risk for cutaneous malignancy given history of transplant. Monitor for changing or symptomatic lesions. Continue annual skin checks and photoprotection.    4. Chronic lymphedema and mild stasis dermatitis. Asymptomatic.  - Recommended to continue compression stockings as she is able  - Advised daily moisturization with bland emollient.   - Offered topical steroid if pruritus develops.      CC Dr. Marion on close of this encounter.    Follow-up in 1 year, earlier for new or changing lesions.     Case discussed with attending physician, Marilynn Thomas MD    Dylon E. Bangura de [place]  Internal Medicine Residency, PGY-2  [facility]   p. [phone]      Staff Physician Comments:   I saw and evaluated the patient with the resident and I agree with the assessment and plan.  I was present for the examination.    Marilynn Thomas MD    Department of Dermatology  [facility]  [facility]: Phone: [phone], Fax: [phone]  8/28/2020

## 2020-08-28 ENCOUNTER — TELEPHONE (OUTPATIENT)
Dept: TRANSPLANT | Facility: CLINIC | Age: 60
End: 2020-08-28

## 2020-08-28 DIAGNOSIS — G89.29 CHRONIC PAIN OF BOTH KNEES: Primary | ICD-10-CM

## 2020-08-28 DIAGNOSIS — Z94.4 LIVER REPLACED BY TRANSPLANT (H): Primary | ICD-10-CM

## 2020-08-28 DIAGNOSIS — M25.562 CHRONIC PAIN OF BOTH KNEES: Primary | ICD-10-CM

## 2020-08-28 DIAGNOSIS — M25.561 CHRONIC PAIN OF BOTH KNEES: Primary | ICD-10-CM

## 2020-08-28 LAB — HBV SURFACE AB SERPL IA-ACNC: NORMAL M[IU]/ML

## 2020-08-31 ENCOUNTER — TELEPHONE (OUTPATIENT)
Dept: TRANSPLANT | Facility: CLINIC | Age: 60
End: 2020-08-31

## 2020-08-31 ENCOUNTER — TELEPHONE (OUTPATIENT)
Dept: ORTHOPEDICS | Facility: CLINIC | Age: 60
End: 2020-08-31

## 2020-08-31 RX ORDER — CYCLOSPORINE 25 MG/1
75 CAPSULE, LIQUID FILLED ORAL 2 TIMES DAILY
Qty: 540 CAPSULE | Refills: 3 | Status: SHIPPED | OUTPATIENT
Start: 2020-08-31 | End: 2021-04-08 | Stop reason: DRUGHIGH

## 2020-08-31 NOTE — TELEPHONE ENCOUNTER
ISSUE:   Cyclosporine level 172 on , goal , dose 100 mg every 12 hours.    PLAN:   Please call patient and confirm this was an accurate 12-hour trough. Verify Cyclosporine dose 100 mg BID. Confirm no new medications or illness. Confirm no missed doses. If accurate trough and accurate dose, decrease Cyclosporine dose to 75 mg BID and repeat labs in 1 months    OUTCOME:   Spoke with patient, they confirm accurate trough level and current dose. Patient confirmed dose change to 75 mg BID and to repeat labs in 1 months. Orders sent to preferred pharmacy for dose change and lab for repeat labs. Patient voiced understanding of plan.

## 2020-08-31 NOTE — TELEPHONE ENCOUNTER
OK, that is fine.  Switch procedure to RIGHT TKA.  Please notify PCP    MD Mike Del Rio Family Professor  Oncology and Adult Reconstructive Surgery  Dept Orthopaedic Surgery, [facility]  [phone] office, [phone] pager  [email or web address]      ----- Message from Janna Claudio MA sent at 8/31/2020  3:13 PM CDT -----  Regarding: Laterality switch  Dr Rodrigo Grajeda called me today and asked if we can switch her surgery to be done on her Right knee first instead of her Left.     She is currently scheduled for a Left TKA on 10/2, but she describes her Left knee as much stronger and it gives her less pain than her Right.

## 2020-08-31 NOTE — TELEPHONE ENCOUNTER
Patient Call: Voicemail  Date/Time: 8/31/20 / 2:25 pm  Reason for call: Patient would like a call back from Duroline.  No further detail provided.

## 2020-09-01 ENCOUNTER — PREP FOR PROCEDURE (OUTPATIENT)
Dept: ORTHOPEDICS | Facility: CLINIC | Age: 60
End: 2020-09-01

## 2020-09-11 ENCOUNTER — TELEPHONE (OUTPATIENT)
Dept: TRANSPLANT | Facility: CLINIC | Age: 60
End: 2020-09-11

## 2020-09-11 NOTE — TELEPHONE ENCOUNTER
Pt had her Hep B titer done end of Aug and labs are still pending  Her PCP would like the results also  Can you call her with the results

## 2020-09-16 ENCOUNTER — TELEPHONE (OUTPATIENT)
Dept: TRANSPLANT | Facility: CLINIC | Age: 60
End: 2020-09-16

## 2020-09-16 NOTE — TELEPHONE ENCOUNTER
Patient Call: General  Route to LPN    Reason for call: pt has been experiencing some lightheadedness and dizziness  Wonders if she can take dramamine or meclizine     Call back needed? Yes    Return Call Needed  Same as documented in contacts section  When to return call?: Greater than one day: Route standard priority

## 2020-09-16 NOTE — TELEPHONE ENCOUNTER
Spoke with patient. Monday she was not feeling well and had a lot of knee pain. Her blood pressure yesterday 127/83 temp 97.9. yesterday drank increased water. Today had a headache when woke up at 4 am and went back to bed and headache was gone. Blood pressure this morning 127/88 temp 97.8 today. This afternoon blood pressure 127/89 temp 99. Recheck 45 mins later  was 98.3. Pt has an appt with PCP tomorrow.   Patient denies urinary frequency, urgency or burning.  Pt recently started melatonin nightly, but stopped it a couple days ago.   Pt felt dizziness has improved since Monday, but still present with fast movements.   Pt denies shortness of breath and cough.   Bowel movements are normal. Did have one loose stool yesterday.

## 2020-09-17 ENCOUNTER — TRANSFERRED RECORDS (OUTPATIENT)
Dept: HEALTH INFORMATION MANAGEMENT | Facility: CLINIC | Age: 60
End: 2020-09-17

## 2020-09-21 ENCOUNTER — TELEPHONE (OUTPATIENT)
Dept: TRANSPLANT | Facility: CLINIC | Age: 60
End: 2020-09-21

## 2020-09-21 DIAGNOSIS — R30.0 DYSURIA: Primary | ICD-10-CM

## 2020-09-21 DIAGNOSIS — N39.0 URINARY TRACT INFECTION: ICD-10-CM

## 2020-09-21 DIAGNOSIS — R30.0 DYSURIA: ICD-10-CM

## 2020-09-21 LAB
ALBUMIN UR-MCNC: 30 MG/DL
APPEARANCE UR: ABNORMAL
BACTERIA #/AREA URNS HPF: ABNORMAL /HPF
BILIRUB UR QL STRIP: NEGATIVE
COLOR UR AUTO: ABNORMAL
GLUCOSE UR STRIP-MCNC: NEGATIVE MG/DL
HGB UR QL STRIP: ABNORMAL
KETONES UR STRIP-MCNC: NEGATIVE MG/DL
LEUKOCYTE ESTERASE UR QL STRIP: ABNORMAL
NITRATE UR QL: NEGATIVE
NON-SQ EPI CELLS #/AREA URNS LPF: ABNORMAL /LPF
PH UR STRIP: 6 PH (ref 5–7)
RBC #/AREA URNS AUTO: >100 /HPF
SOURCE: ABNORMAL
SP GR UR STRIP: 1.01 (ref 1–1.03)
UROBILINOGEN UR STRIP-ACNC: 0.2 EU/DL (ref 0.2–1)
WBC #/AREA URNS AUTO: ABNORMAL /HPF

## 2020-09-21 PROCEDURE — 87086 URINE CULTURE/COLONY COUNT: CPT | Performed by: INTERNAL MEDICINE

## 2020-09-21 PROCEDURE — 87088 URINE BACTERIA CULTURE: CPT | Performed by: INTERNAL MEDICINE

## 2020-09-21 PROCEDURE — 81001 URINALYSIS AUTO W/SCOPE: CPT | Performed by: INTERNAL MEDICINE

## 2020-09-21 PROCEDURE — 87186 SC STD MICRODIL/AGAR DIL: CPT | Performed by: INTERNAL MEDICINE

## 2020-09-21 NOTE — TELEPHONE ENCOUNTER
Per Dr LEventhal patient does not need to be revaccinated. Patient updated that its common post liver tx.

## 2020-09-23 ENCOUNTER — TELEPHONE (OUTPATIENT)
Dept: ORTHOPEDICS | Facility: CLINIC | Age: 60
End: 2020-09-23

## 2020-09-23 DIAGNOSIS — Z11.59 ENCOUNTER FOR SCREENING FOR OTHER VIRAL DISEASES: Primary | ICD-10-CM

## 2020-09-23 NOTE — TELEPHONE ENCOUNTER
Received call from patient requesting to push back the date of her surgery with Dr Wallace. Patient is currently scheduled for 10/2/20, but is awaiting culture results and is anticipating she has a UTI. Patient understands if she is positive that she will require a round of antibiotics, and doesn't feel that she will be in optimal health for surgery on 10/2/20. Patient will reschedule her COVID and pre-op appointments as well.

## 2020-09-24 LAB
BACTERIA SPEC CULT: ABNORMAL
Lab: ABNORMAL
SPECIMEN SOURCE: ABNORMAL

## 2020-09-25 ENCOUNTER — TELEPHONE (OUTPATIENT)
Dept: TRANSPLANT | Facility: CLINIC | Age: 60
End: 2020-09-25

## 2020-09-25 RX ORDER — AMOXICILLIN 250 MG/1
250 CAPSULE ORAL EVERY 8 HOURS
Qty: 30 CAPSULE | Refills: 0 | Status: SHIPPED | OUTPATIENT
Start: 2020-09-25 | End: 2020-09-26

## 2020-09-25 RX ORDER — CIPROFLOXACIN 500 MG/1
500 TABLET, FILM COATED ORAL DAILY
Status: CANCELLED | OUTPATIENT
Start: 2020-09-25

## 2020-09-26 ENCOUNTER — TELEPHONE (OUTPATIENT)
Dept: TRANSPLANT | Facility: CLINIC | Age: 60
End: 2020-09-26

## 2020-09-26 DIAGNOSIS — N39.0 URINARY TRACT INFECTION: ICD-10-CM

## 2020-09-26 DIAGNOSIS — R30.0 DYSURIA: ICD-10-CM

## 2020-09-26 RX ORDER — AMOXICILLIN 250 MG/1
250 CAPSULE ORAL EVERY 8 HOURS
Qty: 30 CAPSULE | Refills: 0 | Status: ON HOLD | OUTPATIENT
Start: 2020-09-26 | End: 2020-10-24

## 2020-09-26 NOTE — TELEPHONE ENCOUNTER
Nicci, called to report that [facility] is closed on weekends and unable to get abx. Patient would like to get it at [facility] in [place]. Script sent to [facility]. Left voicemail for [facility] to cancel script as it's being filled elsewhere. Nicci to call  back with any further questions or issues.

## 2020-09-28 DIAGNOSIS — Z13.220 LIPID SCREENING: ICD-10-CM

## 2020-09-28 DIAGNOSIS — Z94.4 LIVER REPLACED BY TRANSPLANT (H): ICD-10-CM

## 2020-09-28 LAB
ALBUMIN SERPL-MCNC: 3.5 G/DL (ref 3.4–5)
ALP SERPL-CCNC: 140 U/L (ref 40–150)
ALT SERPL W P-5'-P-CCNC: 23 U/L (ref 0–50)
ANION GAP SERPL CALCULATED.3IONS-SCNC: 1 MMOL/L (ref 3–14)
AST SERPL W P-5'-P-CCNC: 29 U/L (ref 0–45)
BILIRUB DIRECT SERPL-MCNC: 0.2 MG/DL (ref 0–0.2)
BILIRUB SERPL-MCNC: 1.2 MG/DL (ref 0.2–1.3)
BUN SERPL-MCNC: 33 MG/DL (ref 7–30)
CALCIUM SERPL-MCNC: 9.4 MG/DL (ref 8.5–10.1)
CHLORIDE SERPL-SCNC: 108 MMOL/L (ref 94–109)
CO2 SERPL-SCNC: 28 MMOL/L (ref 20–32)
CREAT SERPL-MCNC: 1.3 MG/DL (ref 0.52–1.04)
CYCLOSPORINE BLD LC/MS/MS-MCNC: 95 UG/L (ref 50–400)
ERYTHROCYTE [DISTWIDTH] IN BLOOD BY AUTOMATED COUNT: 12 % (ref 10–15)
GFR SERPL CREATININE-BSD FRML MDRD: 44 ML/MIN/{1.73_M2}
GLUCOSE SERPL-MCNC: 90 MG/DL (ref 70–99)
HCT VFR BLD AUTO: 35.9 % (ref 35–47)
HGB BLD-MCNC: 12.4 G/DL (ref 11.7–15.7)
MCH RBC QN AUTO: 34.1 PG (ref 26.5–33)
MCHC RBC AUTO-ENTMCNC: 34.5 G/DL (ref 31.5–36.5)
MCV RBC AUTO: 99 FL (ref 78–100)
PLATELET # BLD AUTO: 167 10E9/L (ref 150–450)
POTASSIUM SERPL-SCNC: 4.8 MMOL/L (ref 3.4–5.3)
PROT SERPL-MCNC: 7.6 G/DL (ref 6.8–8.8)
RBC # BLD AUTO: 3.64 10E12/L (ref 3.8–5.2)
SODIUM SERPL-SCNC: 137 MMOL/L (ref 133–144)
TME LAST DOSE: NORMAL H
WBC # BLD AUTO: 6 10E9/L (ref 4–11)

## 2020-09-28 PROCEDURE — 80076 HEPATIC FUNCTION PANEL: CPT | Performed by: INTERNAL MEDICINE

## 2020-09-28 PROCEDURE — 36415 COLL VENOUS BLD VENIPUNCTURE: CPT | Performed by: INTERNAL MEDICINE

## 2020-09-28 PROCEDURE — 80158 DRUG ASSAY CYCLOSPORINE: CPT | Performed by: INTERNAL MEDICINE

## 2020-09-28 PROCEDURE — 85027 COMPLETE CBC AUTOMATED: CPT | Performed by: INTERNAL MEDICINE

## 2020-09-28 PROCEDURE — 80048 BASIC METABOLIC PNL TOTAL CA: CPT | Performed by: INTERNAL MEDICINE

## 2020-10-13 ENCOUNTER — RECORDS - HEALTHEAST (OUTPATIENT)
Dept: LAB | Facility: CLINIC | Age: 60
End: 2020-10-13

## 2020-10-13 NOTE — PROGRESS NOTES
"SURGERY PLAN (PRE-OP PLAN)     Patient Position (indicated by x):  X  Supine with torso rolled up on a bump   x  Regular OR table   X  Forrester catheter   X  Blue U drape   Blue and White stockinet   Extremity drape   Coban   Ioban             TKA Requests (indicated by x):   X  Biomet Vanguard TKA (+/- PCL retention)      Biomet SSK revision TKA + stems      Specimens and cultures (indicated by x):      Tissue cultures, aerobic and anaerobic without gram stain      Frozen section      pathology specimens - fresh      pathology specimens - formalin      Background: 60 year old female with a past medical history that includes liver transplantation was referred to us because of bilateral knee pain.  This pain is been present for 3 years and has been progressive over time.  She is experiencing night pain and initiation pain.  She currently ambulates 95% of the time with a cane.  She walks in and around the house.  For pain medication she is tried Tylenol and NSAIDs with insufficient pain relief.  She has had 3 corticosteroids and 2 Synvisc injections in the past.  Patient has not tried any bracing because of her peripheral lymphedema- she was not able to fit that appropriately.  Patient is currently on cyclosporine immunosuppressants [to be continued throughout the jessi-op period].    Past medical history: Obesity, lymphedema, and cirrhosis secondary to combination of nonalcoholic fatty liver disease, iron overload, and alpha-1 antitrypsin MZ phenotype who is s/p  donor liver transplant on 19    Per last GI Note (2020 Dr. Leventhal): \"At this time, there is no overt contraindication to her undergoing total knee arthroplasty in relationship to her history of liver transplantation. Antibiotics and postoperative management per the orthopedic team. As she is on a calcineurin inhibitor would minimize the use of NSAIDs as able.\"    Physical Exam: \"Antalgic gait.  Range of motion appears to be 95-0 for both " "knees.  Non-correctable varus deformity on both knees.  Neurovascular intact lower extremities.  Very well controlled lymphedema.\"    Plan: Primary right TKA with BIOMET / Vanguard [Likely PS]                  Shaun Cardenas,   Adult Joint Reconstruction Fellow  Dept Orthopaedic Surgery, [facility]  [phone] Pager [phone] Office      "

## 2020-10-14 LAB — BACTERIA SPEC CULT: NO GROWTH

## 2020-10-19 DIAGNOSIS — Z11.59 ENCOUNTER FOR SCREENING FOR OTHER VIRAL DISEASES: ICD-10-CM

## 2020-10-19 PROCEDURE — U0003 INFECTIOUS AGENT DETECTION BY NUCLEIC ACID (DNA OR RNA); SEVERE ACUTE RESPIRATORY SYNDROME CORONAVIRUS 2 (SARS-COV-2) (CORONAVIRUS DISEASE [COVID-19]), AMPLIFIED PROBE TECHNIQUE, MAKING USE OF HIGH THROUGHPUT TECHNOLOGIES AS DESCRIBED BY CMS-2020-01-R: HCPCS | Performed by: ORTHOPAEDIC SURGERY

## 2020-10-20 LAB
SARS-COV-2 RNA SPEC QL NAA+PROBE: NOT DETECTED
SPECIMEN SOURCE: NORMAL

## 2020-10-22 ENCOUNTER — ANESTHESIA EVENT (OUTPATIENT)
Dept: SURGERY | Facility: CLINIC | Age: 60
DRG: 470 | End: 2020-10-22
Payer: COMMERCIAL

## 2020-10-23 ENCOUNTER — APPOINTMENT (OUTPATIENT)
Dept: GENERAL RADIOLOGY | Facility: CLINIC | Age: 60
DRG: 470 | End: 2020-10-23
Attending: STUDENT IN AN ORGANIZED HEALTH CARE EDUCATION/TRAINING PROGRAM
Payer: COMMERCIAL

## 2020-10-23 ENCOUNTER — ANESTHESIA (OUTPATIENT)
Dept: SURGERY | Facility: CLINIC | Age: 60
DRG: 470 | End: 2020-10-23
Payer: COMMERCIAL

## 2020-10-23 ENCOUNTER — HOSPITAL ENCOUNTER (INPATIENT)
Facility: CLINIC | Age: 60
LOS: 1 days | Discharge: HOME | DRG: 470 | End: 2020-10-26
Attending: ORTHOPAEDIC SURGERY | Admitting: ORTHOPAEDIC SURGERY
Payer: COMMERCIAL

## 2020-10-23 DIAGNOSIS — M25.562 CHRONIC PAIN OF BOTH KNEES: Primary | ICD-10-CM

## 2020-10-23 DIAGNOSIS — G89.29 CHRONIC PAIN OF BOTH KNEES: Primary | ICD-10-CM

## 2020-10-23 DIAGNOSIS — M25.561 CHRONIC PAIN OF BOTH KNEES: Primary | ICD-10-CM

## 2020-10-23 LAB
ABO + RH BLD: NORMAL
ABO + RH BLD: NORMAL
ALBUMIN SERPL-MCNC: 3.8 G/DL (ref 3.4–5)
ALP SERPL-CCNC: 139 U/L (ref 40–150)
ALT SERPL W P-5'-P-CCNC: 27 U/L (ref 0–50)
ANION GAP SERPL CALCULATED.3IONS-SCNC: 2 MMOL/L (ref 3–14)
APTT PPP: 30 SEC (ref 22–37)
AST SERPL W P-5'-P-CCNC: 25 U/L (ref 0–45)
BASOPHILS # BLD AUTO: 0 10E9/L (ref 0–0.2)
BASOPHILS NFR BLD AUTO: 0.3 %
BILIRUB DIRECT SERPL-MCNC: 0.3 MG/DL (ref 0–0.2)
BILIRUB SERPL-MCNC: 1.9 MG/DL (ref 0.2–1.3)
BLD GP AB SCN SERPL QL: NORMAL
BLOOD BANK CMNT PATIENT-IMP: NORMAL
BUN SERPL-MCNC: 29 MG/DL (ref 7–30)
CALCIUM SERPL-MCNC: 9.3 MG/DL (ref 8.5–10.1)
CHLORIDE SERPL-SCNC: 108 MMOL/L (ref 94–109)
CO2 SERPL-SCNC: 27 MMOL/L (ref 20–32)
CREAT SERPL-MCNC: 1.18 MG/DL (ref 0.52–1.04)
CREAT SERPL-MCNC: 1.22 MG/DL (ref 0.52–1.04)
DIFFERENTIAL METHOD BLD: ABNORMAL
EOSINOPHIL # BLD AUTO: 0.4 10E9/L (ref 0–0.7)
EOSINOPHIL NFR BLD AUTO: 6.9 %
ERYTHROCYTE [DISTWIDTH] IN BLOOD BY AUTOMATED COUNT: 11.9 % (ref 10–15)
GFR SERPL CREATININE-BSD FRML MDRD: 48 ML/MIN/{1.73_M2}
GFR SERPL CREATININE-BSD FRML MDRD: 50 ML/MIN/{1.73_M2}
GLUCOSE SERPL-MCNC: 87 MG/DL (ref 70–99)
GLUCOSE SERPL-MCNC: 92 MG/DL (ref 70–99)
HCT VFR BLD AUTO: 38.1 % (ref 35–47)
HGB BLD-MCNC: 12.8 G/DL (ref 11.7–15.7)
IMM GRANULOCYTES # BLD: 0 10E9/L (ref 0–0.4)
IMM GRANULOCYTES NFR BLD: 0.5 %
INR PPP: 0.97 (ref 0.86–1.14)
LYMPHOCYTES # BLD AUTO: 1.2 10E9/L (ref 0.8–5.3)
LYMPHOCYTES NFR BLD AUTO: 20.4 %
MCH RBC QN AUTO: 33.1 PG (ref 26.5–33)
MCHC RBC AUTO-ENTMCNC: 33.6 G/DL (ref 31.5–36.5)
MCV RBC AUTO: 98 FL (ref 78–100)
MONOCYTES # BLD AUTO: 0.6 10E9/L (ref 0–1.3)
MONOCYTES NFR BLD AUTO: 10.1 %
NEUTROPHILS # BLD AUTO: 3.7 10E9/L (ref 1.6–8.3)
NEUTROPHILS NFR BLD AUTO: 61.8 %
NRBC # BLD AUTO: 0 10*3/UL
NRBC BLD AUTO-RTO: 0 /100
PLATELET # BLD AUTO: 154 10E9/L (ref 150–450)
POTASSIUM SERPL-SCNC: 4.8 MMOL/L (ref 3.4–5.3)
POTASSIUM SERPL-SCNC: 4.9 MMOL/L (ref 3.4–5.3)
PROT SERPL-MCNC: 7.7 G/DL (ref 6.8–8.8)
RBC # BLD AUTO: 3.87 10E12/L (ref 3.8–5.2)
SODIUM SERPL-SCNC: 137 MMOL/L (ref 133–144)
SPECIMEN EXP DATE BLD: NORMAL
WBC # BLD AUTO: 6 10E9/L (ref 4–11)

## 2020-10-23 PROCEDURE — 36415 COLL VENOUS BLD VENIPUNCTURE: CPT | Performed by: ANESTHESIOLOGY

## 2020-10-23 PROCEDURE — 250N000011 HC RX IP 250 OP 636: Performed by: NURSE ANESTHETIST, CERTIFIED REGISTERED

## 2020-10-23 PROCEDURE — 999N000065 XR KNEE PORT RT 1/2 VW: Mod: RT

## 2020-10-23 PROCEDURE — 85730 THROMBOPLASTIN TIME PARTIAL: CPT | Performed by: ANESTHESIOLOGY

## 2020-10-23 PROCEDURE — 250N000013 HC RX MED GY IP 250 OP 250 PS 637: Performed by: STUDENT IN AN ORGANIZED HEALTH CARE EDUCATION/TRAINING PROGRAM

## 2020-10-23 PROCEDURE — 80048 BASIC METABOLIC PNL TOTAL CA: CPT | Performed by: ANESTHESIOLOGY

## 2020-10-23 PROCEDURE — 250N000013 HC RX MED GY IP 250 OP 250 PS 637: Performed by: ORTHOPAEDIC SURGERY

## 2020-10-23 PROCEDURE — 99225 PR SUBSEQUENT OBSERVATION CARE,LEVEL II: CPT | Performed by: INTERNAL MEDICINE

## 2020-10-23 PROCEDURE — 999N000139 HC STATISTIC PRE-PROCEDURE ASSESSMENT II: Performed by: ORTHOPAEDIC SURGERY

## 2020-10-23 PROCEDURE — 86901 BLOOD TYPING SEROLOGIC RH(D): CPT | Performed by: ANESTHESIOLOGY

## 2020-10-23 PROCEDURE — 250N000009 HC RX 250: Performed by: NURSE ANESTHETIST, CERTIFIED REGISTERED

## 2020-10-23 PROCEDURE — 0SRC0J9 REPLACEMENT OF RIGHT KNEE JOINT WITH SYNTHETIC SUBSTITUTE, CEMENTED, OPEN APPROACH: ICD-10-PCS | Performed by: ORTHOPAEDIC SURGERY

## 2020-10-23 PROCEDURE — 80076 HEPATIC FUNCTION PANEL: CPT | Performed by: ANESTHESIOLOGY

## 2020-10-23 PROCEDURE — 82565 ASSAY OF CREATININE: CPT | Performed by: ANESTHESIOLOGY

## 2020-10-23 PROCEDURE — 250N000009 HC RX 250: Performed by: ORTHOPAEDIC SURGERY

## 2020-10-23 PROCEDURE — 360N000029 HC SURGERY LEVEL 4 EA 15 ADDTL MIN - UMMC: Performed by: ORTHOPAEDIC SURGERY

## 2020-10-23 PROCEDURE — 86850 RBC ANTIBODY SCREEN: CPT | Performed by: ANESTHESIOLOGY

## 2020-10-23 PROCEDURE — 250N000012 HC RX MED GY IP 250 OP 636 PS 637: Performed by: INTERNAL MEDICINE

## 2020-10-23 PROCEDURE — 85610 PROTHROMBIN TIME: CPT | Performed by: ANESTHESIOLOGY

## 2020-10-23 PROCEDURE — 250N000011 HC RX IP 250 OP 636: Performed by: ORTHOPAEDIC SURGERY

## 2020-10-23 PROCEDURE — 99207 PR CONSULT E&M CHANGED TO SUBSEQUENT LEVEL: CPT | Performed by: INTERNAL MEDICINE

## 2020-10-23 PROCEDURE — 258N000003 HC RX IP 258 OP 636: Performed by: ANESTHESIOLOGY

## 2020-10-23 PROCEDURE — C1776 JOINT DEVICE (IMPLANTABLE): HCPCS | Performed by: ORTHOPAEDIC SURGERY

## 2020-10-23 PROCEDURE — 82947 ASSAY GLUCOSE BLOOD QUANT: CPT | Performed by: ANESTHESIOLOGY

## 2020-10-23 PROCEDURE — 84132 ASSAY OF SERUM POTASSIUM: CPT | Performed by: ANESTHESIOLOGY

## 2020-10-23 PROCEDURE — 272N000001 HC OR GENERAL SUPPLY STERILE: Performed by: ORTHOPAEDIC SURGERY

## 2020-10-23 PROCEDURE — 250N000003 HC SEVOFLURANE, EA 15 MIN: Performed by: ORTHOPAEDIC SURGERY

## 2020-10-23 PROCEDURE — 278N000051 HC OR IMPLANT GENERAL: Performed by: ORTHOPAEDIC SURGERY

## 2020-10-23 PROCEDURE — 258N000001 HC RX 258: Performed by: ORTHOPAEDIC SURGERY

## 2020-10-23 PROCEDURE — 250N000011 HC RX IP 250 OP 636: Performed by: ANESTHESIOLOGY

## 2020-10-23 PROCEDURE — 370N000001 HC ANESTHESIA TECHNICAL FEE, 1ST 30 MIN: Performed by: ORTHOPAEDIC SURGERY

## 2020-10-23 PROCEDURE — 761N000005 HC RECOVERY PHASE 1 LEVEL 3 FIRST HR: Performed by: ORTHOPAEDIC SURGERY

## 2020-10-23 PROCEDURE — 86900 BLOOD TYPING SEROLOGIC ABO: CPT | Performed by: ANESTHESIOLOGY

## 2020-10-23 PROCEDURE — 370N000002 HC ANESTHESIA TECHNICAL FEE, EACH ADDTL 15 MIN: Performed by: ORTHOPAEDIC SURGERY

## 2020-10-23 PROCEDURE — 360N000028 HC SURGERY LEVEL 4 1ST 30 MIN - UMMC: Performed by: ORTHOPAEDIC SURGERY

## 2020-10-23 PROCEDURE — 258N000003 HC RX IP 258 OP 636: Performed by: ORTHOPAEDIC SURGERY

## 2020-10-23 PROCEDURE — 761N000006 HC RECOVERY PHASE 1 LEVEL 3 EA ADDTL HR: Performed by: ORTHOPAEDIC SURGERY

## 2020-10-23 PROCEDURE — 85025 COMPLETE CBC W/AUTO DIFF WBC: CPT | Performed by: ANESTHESIOLOGY

## 2020-10-23 PROCEDURE — 258N000003 HC RX IP 258 OP 636: Performed by: NURSE ANESTHETIST, CERTIFIED REGISTERED

## 2020-10-23 DEVICE — IMPLANTABLE DEVICE
Type: IMPLANTABLE DEVICE | Site: KNEE | Status: FUNCTIONAL
Brand: VANGUARD® KNEE SYSTEM

## 2020-10-23 DEVICE — IMPLANTABLE DEVICE: Type: IMPLANTABLE DEVICE | Site: KNEE | Status: FUNCTIONAL

## 2020-10-23 DEVICE — IMP COMP TIBIAL KNEE ASCENT 71MM 141233: Type: IMPLANTABLE DEVICE | Site: KNEE | Status: FUNCTIONAL

## 2020-10-23 DEVICE — BONE CEMENT SIMPLEX FULL DOSE 6191-1-001: Type: IMPLANTABLE DEVICE | Site: KNEE | Status: FUNCTIONAL

## 2020-10-23 DEVICE — IMP COMP FEMORAL VANGARD BIOM RT 67.5MM 183010: Type: IMPLANTABLE DEVICE | Site: KNEE | Status: FUNCTIONAL

## 2020-10-23 RX ORDER — ONDANSETRON 2 MG/ML
4 INJECTION INTRAMUSCULAR; INTRAVENOUS EVERY 30 MIN PRN
Status: DISCONTINUED | OUTPATIENT
Start: 2020-10-23 | End: 2020-10-23

## 2020-10-23 RX ORDER — ACETAMINOPHEN 325 MG/1
975 TABLET ORAL EVERY 8 HOURS
Status: DISCONTINUED | OUTPATIENT
Start: 2020-10-23 | End: 2020-10-25

## 2020-10-23 RX ORDER — ACETAMINOPHEN 325 MG/1
650 TABLET ORAL EVERY 4 HOURS PRN
Status: DISCONTINUED | OUTPATIENT
Start: 2020-10-26 | End: 2020-10-26 | Stop reason: HOSPADM

## 2020-10-23 RX ORDER — GABAPENTIN 100 MG/1
100 CAPSULE ORAL 3 TIMES DAILY PRN
Status: DISCONTINUED | OUTPATIENT
Start: 2020-10-23 | End: 2020-10-26 | Stop reason: HOSPADM

## 2020-10-23 RX ORDER — POLYETHYLENE GLYCOL 3350 17 G/17G
17 POWDER, FOR SOLUTION ORAL DAILY
Status: DISCONTINUED | OUTPATIENT
Start: 2020-10-24 | End: 2020-10-26 | Stop reason: HOSPADM

## 2020-10-23 RX ORDER — ONDANSETRON 2 MG/ML
INJECTION INTRAMUSCULAR; INTRAVENOUS PRN
Status: DISCONTINUED | OUTPATIENT
Start: 2020-10-23 | End: 2020-10-23

## 2020-10-23 RX ORDER — HYDROMORPHONE HCL IN WATER/PF 6 MG/30 ML
0.2 PATIENT CONTROLLED ANALGESIA SYRINGE INTRAVENOUS
Status: DISCONTINUED | OUTPATIENT
Start: 2020-10-23 | End: 2020-10-26 | Stop reason: HOSPADM

## 2020-10-23 RX ORDER — LIDOCAINE 40 MG/G
CREAM TOPICAL
Status: DISCONTINUED | OUTPATIENT
Start: 2020-10-23 | End: 2020-10-26 | Stop reason: HOSPADM

## 2020-10-23 RX ORDER — MAGNESIUM HYDROXIDE 1200 MG/15ML
LIQUID ORAL PRN
Status: DISCONTINUED | OUTPATIENT
Start: 2020-10-23 | End: 2020-10-23 | Stop reason: HOSPADM

## 2020-10-23 RX ORDER — AMOXICILLIN 250 MG
1-2 CAPSULE ORAL 2 TIMES DAILY
Qty: 30 TABLET | Refills: 0 | Status: ON HOLD | OUTPATIENT
Start: 2020-10-23 | End: 2020-10-29

## 2020-10-23 RX ORDER — ACETAMINOPHEN 325 MG/1
325-650 TABLET ORAL EVERY 6 HOURS PRN
COMMUNITY
End: 2020-10-28

## 2020-10-23 RX ORDER — NALOXONE HYDROCHLORIDE 0.4 MG/ML
.1-.4 INJECTION, SOLUTION INTRAMUSCULAR; INTRAVENOUS; SUBCUTANEOUS
Status: DISCONTINUED | OUTPATIENT
Start: 2020-10-23 | End: 2020-10-23

## 2020-10-23 RX ORDER — DEXAMETHASONE SODIUM PHOSPHATE 4 MG/ML
INJECTION, SOLUTION INTRA-ARTICULAR; INTRALESIONAL; INTRAMUSCULAR; INTRAVENOUS; SOFT TISSUE PRN
Status: DISCONTINUED | OUTPATIENT
Start: 2020-10-23 | End: 2020-10-23

## 2020-10-23 RX ORDER — BISACODYL 10 MG
10 SUPPOSITORY, RECTAL RECTAL DAILY PRN
Status: DISCONTINUED | OUTPATIENT
Start: 2020-10-23 | End: 2020-10-26 | Stop reason: HOSPADM

## 2020-10-23 RX ORDER — OXYCODONE HYDROCHLORIDE 10 MG/1
10 TABLET ORAL EVERY 4 HOURS PRN
Status: DISCONTINUED | OUTPATIENT
Start: 2020-10-23 | End: 2020-10-26 | Stop reason: HOSPADM

## 2020-10-23 RX ORDER — HYDROMORPHONE HYDROCHLORIDE 1 MG/ML
0.4 INJECTION, SOLUTION INTRAMUSCULAR; INTRAVENOUS; SUBCUTANEOUS
Status: DISCONTINUED | OUTPATIENT
Start: 2020-10-23 | End: 2020-10-26 | Stop reason: HOSPADM

## 2020-10-23 RX ORDER — SODIUM CHLORIDE, SODIUM LACTATE, POTASSIUM CHLORIDE, CALCIUM CHLORIDE 600; 310; 30; 20 MG/100ML; MG/100ML; MG/100ML; MG/100ML
INJECTION, SOLUTION INTRAVENOUS CONTINUOUS
Status: DISCONTINUED | OUTPATIENT
Start: 2020-10-23 | End: 2020-10-24

## 2020-10-23 RX ORDER — NALOXONE HYDROCHLORIDE 0.4 MG/ML
.1-.4 INJECTION, SOLUTION INTRAMUSCULAR; INTRAVENOUS; SUBCUTANEOUS
Status: ACTIVE | OUTPATIENT
Start: 2020-10-23 | End: 2020-10-24

## 2020-10-23 RX ORDER — OXYCODONE HYDROCHLORIDE 5 MG/1
5-10 TABLET ORAL
Qty: 30 TABLET | Refills: 0 | Status: SHIPPED | OUTPATIENT
Start: 2020-10-23 | End: 2020-11-05

## 2020-10-23 RX ORDER — AMOXICILLIN 250 MG
1 CAPSULE ORAL 2 TIMES DAILY
Status: DISCONTINUED | OUTPATIENT
Start: 2020-10-23 | End: 2020-10-26 | Stop reason: HOSPADM

## 2020-10-23 RX ORDER — SODIUM CHLORIDE, SODIUM LACTATE, POTASSIUM CHLORIDE, CALCIUM CHLORIDE 600; 310; 30; 20 MG/100ML; MG/100ML; MG/100ML; MG/100ML
INJECTION, SOLUTION INTRAVENOUS CONTINUOUS
Status: DISCONTINUED | OUTPATIENT
Start: 2020-10-23 | End: 2020-10-23 | Stop reason: HOSPADM

## 2020-10-23 RX ORDER — CEFAZOLIN SODIUM 1 G/3ML
1 INJECTION, POWDER, FOR SOLUTION INTRAMUSCULAR; INTRAVENOUS SEE ADMIN INSTRUCTIONS
Status: DISCONTINUED | OUTPATIENT
Start: 2020-10-23 | End: 2020-10-23 | Stop reason: HOSPADM

## 2020-10-23 RX ORDER — FENTANYL CITRATE 50 UG/ML
INJECTION, SOLUTION INTRAMUSCULAR; INTRAVENOUS PRN
Status: DISCONTINUED | OUTPATIENT
Start: 2020-10-23 | End: 2020-10-23

## 2020-10-23 RX ORDER — ONDANSETRON 4 MG/1
4 TABLET, ORALLY DISINTEGRATING ORAL EVERY 30 MIN PRN
Status: DISCONTINUED | OUTPATIENT
Start: 2020-10-23 | End: 2020-10-23

## 2020-10-23 RX ORDER — CYCLOSPORINE 25 MG/1
75 CAPSULE, LIQUID FILLED ORAL 2 TIMES DAILY
Status: DISCONTINUED | OUTPATIENT
Start: 2020-10-23 | End: 2020-10-26 | Stop reason: HOSPADM

## 2020-10-23 RX ORDER — LIDOCAINE 40 MG/G
CREAM TOPICAL
Status: DISCONTINUED | OUTPATIENT
Start: 2020-10-23 | End: 2020-10-23 | Stop reason: HOSPADM

## 2020-10-23 RX ORDER — BUPIVACAINE HYDROCHLORIDE 2.5 MG/ML
INJECTION, SOLUTION EPIDURAL; INFILTRATION; INTRACAUDAL PRN
Status: DISCONTINUED | OUTPATIENT
Start: 2020-10-23 | End: 2020-10-23

## 2020-10-23 RX ORDER — OXYCODONE HYDROCHLORIDE 5 MG/1
5 TABLET ORAL EVERY 4 HOURS PRN
Status: DISCONTINUED | OUTPATIENT
Start: 2020-10-23 | End: 2020-10-26 | Stop reason: HOSPADM

## 2020-10-23 RX ORDER — ONDANSETRON 2 MG/ML
4 INJECTION INTRAMUSCULAR; INTRAVENOUS EVERY 6 HOURS PRN
Status: DISCONTINUED | OUTPATIENT
Start: 2020-10-23 | End: 2020-10-26 | Stop reason: HOSPADM

## 2020-10-23 RX ORDER — SODIUM CHLORIDE, SODIUM LACTATE, POTASSIUM CHLORIDE, CALCIUM CHLORIDE 600; 310; 30; 20 MG/100ML; MG/100ML; MG/100ML; MG/100ML
INJECTION, SOLUTION INTRAVENOUS CONTINUOUS
Status: DISCONTINUED | OUTPATIENT
Start: 2020-10-23 | End: 2020-10-23

## 2020-10-23 RX ORDER — ONDANSETRON 4 MG/1
4 TABLET, ORALLY DISINTEGRATING ORAL EVERY 6 HOURS PRN
Status: DISCONTINUED | OUTPATIENT
Start: 2020-10-23 | End: 2020-10-26 | Stop reason: HOSPADM

## 2020-10-23 RX ORDER — FAMOTIDINE 20 MG/1
20 TABLET, FILM COATED ORAL 2 TIMES DAILY
Status: DISCONTINUED | OUTPATIENT
Start: 2020-10-23 | End: 2020-10-26 | Stop reason: HOSPADM

## 2020-10-23 RX ORDER — DOCUSATE SODIUM 100 MG/1
100 CAPSULE, LIQUID FILLED ORAL 2 TIMES DAILY
Status: DISCONTINUED | OUTPATIENT
Start: 2020-10-23 | End: 2020-10-26 | Stop reason: HOSPADM

## 2020-10-23 RX ORDER — ACETAMINOPHEN 325 MG/1
975 TABLET ORAL ONCE
Status: DISCONTINUED | OUTPATIENT
Start: 2020-10-23 | End: 2020-10-23 | Stop reason: HOSPADM

## 2020-10-23 RX ORDER — LIDOCAINE HYDROCHLORIDE 20 MG/ML
INJECTION, SOLUTION INFILTRATION; PERINEURAL PRN
Status: DISCONTINUED | OUTPATIENT
Start: 2020-10-23 | End: 2020-10-23

## 2020-10-23 RX ORDER — FENTANYL CITRATE 50 UG/ML
25-50 INJECTION, SOLUTION INTRAMUSCULAR; INTRAVENOUS
Status: DISCONTINUED | OUTPATIENT
Start: 2020-10-23 | End: 2020-10-23 | Stop reason: CLARIF

## 2020-10-23 RX ORDER — POLYETHYLENE GLYCOL 3350 17 G/17G
1 POWDER, FOR SOLUTION ORAL DAILY
Qty: 7 PACKET | Refills: 0 | Status: SHIPPED | OUTPATIENT
Start: 2020-10-23 | End: 2020-11-20

## 2020-10-23 RX ORDER — PROPOFOL 10 MG/ML
INJECTION, EMULSION INTRAVENOUS PRN
Status: DISCONTINUED | OUTPATIENT
Start: 2020-10-23 | End: 2020-10-23

## 2020-10-23 RX ORDER — CEFAZOLIN SODIUM 2 G/100ML
2 INJECTION, SOLUTION INTRAVENOUS
Status: COMPLETED | OUTPATIENT
Start: 2020-10-23 | End: 2020-10-23

## 2020-10-23 RX ORDER — TRANEXAMIC ACID 650 MG/1
1950 TABLET ORAL ONCE
Status: COMPLETED | OUTPATIENT
Start: 2020-10-23 | End: 2020-10-23

## 2020-10-23 RX ORDER — CEFAZOLIN SODIUM 2 G/100ML
2 INJECTION, SOLUTION INTRAVENOUS EVERY 8 HOURS
Status: COMPLETED | OUTPATIENT
Start: 2020-10-23 | End: 2020-10-24

## 2020-10-23 RX ORDER — ACETAMINOPHEN 325 MG/1
650 TABLET ORAL EVERY 4 HOURS
Qty: 100 TABLET | Refills: 0 | Status: ON HOLD | OUTPATIENT
Start: 2020-10-23 | End: 2020-10-29

## 2020-10-23 RX ORDER — FAMOTIDINE 20 MG/1
20 TABLET, FILM COATED ORAL 2 TIMES DAILY
Status: DISCONTINUED | OUTPATIENT
Start: 2020-10-23 | End: 2020-10-23

## 2020-10-23 RX ORDER — PROCHLORPERAZINE MALEATE 10 MG
10 TABLET ORAL EVERY 6 HOURS PRN
Status: DISCONTINUED | OUTPATIENT
Start: 2020-10-23 | End: 2020-10-26 | Stop reason: HOSPADM

## 2020-10-23 RX ADMIN — MIDAZOLAM 1 MG: 1 INJECTION INTRAMUSCULAR; INTRAVENOUS at 12:35

## 2020-10-23 RX ADMIN — PHENYLEPHRINE HYDROCHLORIDE 100 MCG: 10 INJECTION INTRAVENOUS at 13:40

## 2020-10-23 RX ADMIN — ROCURONIUM BROMIDE 50 MG: 10 INJECTION INTRAVENOUS at 13:26

## 2020-10-23 RX ADMIN — SODIUM CHLORIDE, POTASSIUM CHLORIDE, SODIUM LACTATE AND CALCIUM CHLORIDE: 600; 310; 30; 20 INJECTION, SOLUTION INTRAVENOUS at 16:57

## 2020-10-23 RX ADMIN — MIDAZOLAM 1 MG: 1 INJECTION INTRAMUSCULAR; INTRAVENOUS at 13:18

## 2020-10-23 RX ADMIN — SUGAMMADEX 200 MG: 100 INJECTION, SOLUTION INTRAVENOUS at 16:14

## 2020-10-23 RX ADMIN — MIDAZOLAM 1 MG: 1 INJECTION INTRAMUSCULAR; INTRAVENOUS at 12:36

## 2020-10-23 RX ADMIN — FENTANYL CITRATE 50 MCG: 50 INJECTION, SOLUTION INTRAMUSCULAR; INTRAVENOUS at 13:26

## 2020-10-23 RX ADMIN — DEXMEDETOMIDINE HYDROCHLORIDE 20 MCG: 100 INJECTION, SOLUTION INTRAVENOUS at 16:55

## 2020-10-23 RX ADMIN — HYDROMORPHONE HYDROCHLORIDE 0.25 MG: 1 INJECTION, SOLUTION INTRAMUSCULAR; INTRAVENOUS; SUBCUTANEOUS at 16:30

## 2020-10-23 RX ADMIN — MIDAZOLAM 1 MG: 1 INJECTION INTRAMUSCULAR; INTRAVENOUS at 13:07

## 2020-10-23 RX ADMIN — ONDANSETRON 4 MG: 2 INJECTION INTRAMUSCULAR; INTRAVENOUS at 16:18

## 2020-10-23 RX ADMIN — HYDROMORPHONE HYDROCHLORIDE 0.25 MG: 1 INJECTION, SOLUTION INTRAMUSCULAR; INTRAVENOUS; SUBCUTANEOUS at 16:25

## 2020-10-23 RX ADMIN — DOCUSATE SODIUM 100 MG: 100 CAPSULE, LIQUID FILLED ORAL at 21:19

## 2020-10-23 RX ADMIN — ACETAMINOPHEN 975 MG: 325 TABLET, FILM COATED ORAL at 19:11

## 2020-10-23 RX ADMIN — FENTANYL CITRATE 50 MCG: 50 INJECTION, SOLUTION INTRAMUSCULAR; INTRAVENOUS at 12:47

## 2020-10-23 RX ADMIN — FAMOTIDINE 20 MG: 20 TABLET ORAL at 21:19

## 2020-10-23 RX ADMIN — CEFAZOLIN 1 G: 1 INJECTION, POWDER, FOR SOLUTION INTRAMUSCULAR; INTRAVENOUS at 15:30

## 2020-10-23 RX ADMIN — SODIUM CHLORIDE, POTASSIUM CHLORIDE, SODIUM LACTATE AND CALCIUM CHLORIDE: 600; 310; 30; 20 INJECTION, SOLUTION INTRAVENOUS at 12:47

## 2020-10-23 RX ADMIN — FENTANYL CITRATE 50 MCG: 50 INJECTION, SOLUTION INTRAMUSCULAR; INTRAVENOUS at 12:36

## 2020-10-23 RX ADMIN — TRANEXAMIC ACID 1950 MG: 650 TABLET ORAL at 12:25

## 2020-10-23 RX ADMIN — DEXMEDETOMIDINE HYDROCHLORIDE 20 MCG: 100 INJECTION, SOLUTION INTRAVENOUS at 16:30

## 2020-10-23 RX ADMIN — PROPOFOL 150 MG: 10 INJECTION, EMULSION INTRAVENOUS at 13:26

## 2020-10-23 RX ADMIN — FENTANYL CITRATE 50 MCG: 50 INJECTION, SOLUTION INTRAMUSCULAR; INTRAVENOUS at 16:34

## 2020-10-23 RX ADMIN — SODIUM CHLORIDE, POTASSIUM CHLORIDE, SODIUM LACTATE AND CALCIUM CHLORIDE: 600; 310; 30; 20 INJECTION, SOLUTION INTRAVENOUS at 13:54

## 2020-10-23 RX ADMIN — DEXAMETHASONE SODIUM PHOSPHATE 8 MG: 4 INJECTION, SOLUTION INTRAMUSCULAR; INTRAVENOUS at 13:40

## 2020-10-23 RX ADMIN — HYDROMORPHONE HYDROCHLORIDE 0.5 MG: 1 INJECTION, SOLUTION INTRAMUSCULAR; INTRAVENOUS; SUBCUTANEOUS at 15:05

## 2020-10-23 RX ADMIN — CEFAZOLIN 2 G: 10 INJECTION, POWDER, FOR SOLUTION INTRAVENOUS at 13:30

## 2020-10-23 RX ADMIN — FENTANYL CITRATE 50 MCG: 50 INJECTION, SOLUTION INTRAMUSCULAR; INTRAVENOUS at 13:07

## 2020-10-23 RX ADMIN — CYCLOSPORINE 75 MG: 25 CAPSULE, LIQUID FILLED ORAL at 22:07

## 2020-10-23 RX ADMIN — SENNOSIDES AND DOCUSATE SODIUM 1 TABLET: 8.6; 5 TABLET ORAL at 21:19

## 2020-10-23 RX ADMIN — LIDOCAINE HYDROCHLORIDE 100 MG: 20 INJECTION, SOLUTION INFILTRATION; PERINEURAL at 13:26

## 2020-10-23 RX ADMIN — BUPIVACAINE HYDROCHLORIDE 20 ML: 2.5 INJECTION, SOLUTION EPIDURAL; INFILTRATION; INTRACAUDAL at 17:20

## 2020-10-23 RX ADMIN — OXYCODONE HYDROCHLORIDE 5 MG: 5 TABLET ORAL at 19:11

## 2020-10-23 ASSESSMENT — MIFFLIN-ST. JEOR: SCORE: 1453.38

## 2020-10-23 NOTE — OR NURSING
"Pt continues to fall asleep and have apnea that drops her oxygen saturations.  Pt does not awaken to alarm and needs stimulation to wake up and breath.  Pt states \"I was checked last year for sleep apnea and did not have it\".  Will continue to monitor.  Pt states \"still have knee incision pain but it is tolerable with discomfort\".  Transfer of care to Kimberly Katz RN.  "

## 2020-10-23 NOTE — OR NURSING
"Arrived PACU per bed.  Pt sleeping or awake and crying stating \"Off the charts pain in right knee\".   CRNA here giving Precedex and pain medication.  Pt breathing 10 per minute.   Requiring frequent interventions to take deep breaths to maintain rate and oxygen saturations.  Breath sounds clear to bases heard anterior chest.  Dressing dry and intact.  Dr Hays here at bedside for possible block.  "

## 2020-10-23 NOTE — ANESTHESIA PROCEDURE NOTES
Airway   Date/Time: 10/23/2020 1:28 PM   Patient location during procedure: OR    Staff -   CRNA: Michelle Browne APRN CRNA  Performed By: CRNA    Consent for Airway   Urgency: elective    Indications and Patient Condition  Indications for airway management: jessi-procedural  Induction type:intravenousMask difficulty assessment: 1 - vent by mask    Final Airway Details  Final airway type: endotracheal airway  Successful airway:ETT - single  Endotracheal Airway Details   ETT size (mm): 7.0  Cuffed: yes  Cuff volume (mL): 2  Successful intubation technique: direct laryngoscopy  Grade View of Cords: 1  Adjucts: stylet  Measured from: gums/teeth  Secured at (cm): 22  Secured with: silk tape  Bite block used: None    Post intubation assessment   Placement verified by: capnometry, equal breath sounds and chest rise   Number of attempts at approach: 1  Number of other approaches attempted: 0  Secured with:silk tape  Ease of procedure: easy  Dentition: Intact and Unchanged

## 2020-10-23 NOTE — ANESTHESIA POSTPROCEDURE EVALUATION
Anesthesia POST Procedure Evaluation    Patient: Nicci Pemberton   MRN:     [id number] Gender:   female   Age:    60 year old :      1960        Preoperative Diagnosis: Chronic pain of both knees [M25.561, M25.562, G89.29]   Procedure(s):  Right  total knee arthroplasty   Postop Comments: No value filed.     Anesthesia Type: Spinal, MAC, Peripheral Nerve Block, For Post-op pain in coordination with surgeon          Postop Pain Control: Challenging            Challenges/Interventions: Rescue Block   PONV: No   Neuro/Psych: Uneventful            Sign Out: Acceptable/Baseline neuro status   Airway/Respiratory: Uneventful            Sign Out: Acceptable/Baseline resp. status   CV/Hemodynamics: Uneventful            Sign Out: Acceptable CV status   Other NRE:    DID A NON-ROUTINE EVENT OCCUR?     Event details/Postop Comments:  Adductor canal block/regional MD         Last Anesthesia Record Vitals:  CRNA VITALS  10/23/2020 1604 - 10/23/2020 1704      10/23/2020             NIBP:  121/59    Pulse:  60          Last PACU Vitals:  Vitals Value Taken Time   /75 10/23/20 1730   Temp 36.6  C (97.9  F) 10/23/20 1643   Pulse 62 10/23/20 1734   Resp 57 10/23/20 1734   SpO2 100 % 10/23/20 1734   Temp src     NIBP 121/59 10/23/20 1648   Pulse 60 10/23/20 1648   SpO2 99 % 10/23/20 1645   Resp     Temp 36.6  C (97.9  F) 10/23/20 1645   Ht Rate     Temp 2     Vitals shown include unvalidated device data.      Electronically Signed By: Brayan Johnston DO, 2020, 5:35 PM

## 2020-10-23 NOTE — ANESTHESIA PROCEDURE NOTES
Peripheral Nerve Block Procedure Note      Staff -   Anesthesiologist:  Julia Sylvester MD  Performed By: anesthesiologist  Location: Pre-op  Procedure Start/Stop TImes:      10/23/2020 5:15 PM     10/23/2020 5:20 PM    patient identified, IV checked, site marked, risks and benefits discussed, informed consent, monitors and equipment checked, pre-op evaluation, at physician/surgeon's request and post-op pain management      Correct Patient: Yes      Correct Position: Yes      Correct Site: Yes      Correct Procedure: Yes      Correct Laterality:  Yes    Site Marked:  Yes  Procedure details:     Procedure:  Adductor canal    Diagnosis:  Postoperative pain relief    Laterality:  Right    Position:  Supine    Sterile Prep: chloraprep, mask and sterile gloves      Local skin infiltration:  None    Needle:  Insulated    Needle gauge:  21    Needle length (mm):  110    Ultrasound: Yes      Ultrasound used to identify targeted nerve, plexus, or vascular structure and placed a needle adjacent to it      Permanent Image entered into patiient's record      Abnormal pain on injection: No      Blood Aspirated: No      Paresthesias:  No    Bleeding at site: No      Bolus via:  Needle    Infusion Method:  Single Shot    Complications:  None  Assessment/Narrative:     Injection made incrementally with aspirations every (mL):  5     Informed consent was obtained by Dr. Herrera in preop.   Patient tolerated well. Incremental aspiration every 5 mL. No paresthesia, no heme. Needle tip visualized throughout with appropriate spread of local anesthetic around nerves.   Block was placed at the surgeon's request for post operative pain control.

## 2020-10-23 NOTE — ANESTHESIA PROCEDURE NOTES
Spinal/LP Procedure Note    Spinal Block      Staff -   Anesthesiologist:  Brayan Johnston DO  Performed By: anesthesiologist  Location: OR  Procedure Start/Stop Times:     patient identified, IV checked, site marked, risks and benefits discussed, informed consent, monitors and equipment checked, pre-op evaluation, at physician/surgeon's request and post-op pain management    Procedure:     Procedure:  Intrathecal    ASA:  3    Position:  Sitting    Sterile Prep: Betadine      Insertion site:  L4-5    Approach:  Midline    Needle Type:  Quincke    Needle gauge (G):  22    Local Skin Infiltration:  1% lidocaine    Introducer used: No      Attempts:  3    CSF:  Clear    Paresthesias:  No  Assessment/Narrative:      Marginal block - patient intubated.

## 2020-10-23 NOTE — OR NURSING
"Pt refuses Tylenol \"I try not to take it very much due to my transplant\".  \"It does not work very well\".  "

## 2020-10-23 NOTE — ANESTHESIA PREPROCEDURE EVALUATION
Anesthesia Pre-Procedure Evaluation    Patient: Nicci Pemberton   MRN:     [id number] Gender:   female   Age:    60 year old :      1960        Preoperative Diagnosis: Chronic pain of both knees [M25.561, M25.562, G89.29]   Procedure(s):  Right  total knee arthroplasty     LABS:  CBC:   Lab Results   Component Value Date    WBC 6.0 10/23/2020    WBC 6.0 2020    HGB 12.8 10/23/2020    HGB 12.4 2020    HCT 38.1 10/23/2020    HCT 35.9 2020     10/23/2020     2020     BMP:   Lab Results   Component Value Date     10/23/2020     2020    POTASSIUM 4.9 10/23/2020    POTASSIUM 4.8 10/23/2020    CHLORIDE 108 10/23/2020    CHLORIDE 108 2020    CO2 27 10/23/2020    CO2 28 2020    BUN 29 10/23/2020    BUN 33 (H) 2020    CR 1.18 (H) 10/23/2020    CR 1.22 (H) 10/23/2020    GLC 92 10/23/2020    GLC 87 10/23/2020     COAGS:   Lab Results   Component Value Date    PTT 30 10/23/2020    INR 0.97 10/23/2020    FIBR 317 2019     POC:   Lab Results   Component Value Date    BGM 82 2020    HCGS Negative 2018     OTHER:   Lab Results   Component Value Date    PH 7.46 (H) 2019    LACT 0.7 2019    A1C 5.0 2019    JACOB 9.3 10/23/2020    PHOS 3.8 2020    MAG 2.0 2020    ALBUMIN 3.5 2020    PROTTOTAL 7.6 2020    ALT 23 2020    AST 29 2020    ALKPHOS 140 2020    BILITOTAL 1.2 2020    LIPASE 39 (L) 2020    AMYLASE 33 2020    DOROTHEA 23 2019    TSH 2.48 2020    T4 0.70 (L) 2019    CRP 8.9 (H) 2018    SED 17 2018        Preop Vitals    BP Readings from Last 3 Encounters:   10/23/20 111/75   20 122/85   20 130/87    Pulse Readings from Last 3 Encounters:   10/23/20 75   20 76   20 61      Resp Readings from Last 3 Encounters:   10/23/20 16   20 18   20 16    SpO2 Readings from Last 3 Encounters:   10/23/20 98%  "  08/27/20 98%   05/13/20 100%      Temp Readings from Last 1 Encounters:   10/23/20 37  C (98.6  F) (Oral)    Ht Readings from Last 1 Encounters:   10/23/20 1.575 m (5' 2.01\")      Wt Readings from Last 1 Encounters:   10/23/20 93 kg (205 lb 0.4 oz)    Estimated body mass index is 37.49 kg/m  as calculated from the following:    Height as of this encounter: 1.575 m (5' 2.01\").    Weight as of this encounter: 93 kg (205 lb 0.4 oz).     LDA:        Past Medical History:   Diagnosis Date     Anemia      Cellulitis      Cirrhosis of liver (H) 6/18/2018     Heterozygous alpha 1-antitrypsin deficiency (H)      High hepatic iron concentration determined by biopsy of liver      Liver cirrhosis secondary to ANGULO (H)      Liver transplanted (H) 08/18/2019     Lymphedema      Osteoarthritis     knees     SBP (spontaneous bacterial peritonitis) (H) 8/2/2019 8/1/19 in [facility]      Past Surgical History:   Procedure Laterality Date     BENCH LIVER N/A 8/18/2019    Procedure: BACKBENCH PREPARATION, LIVER;  Surgeon: Mandeep Alford MD;  Location: [facility] OR     COLONOSCOPY N/A 6/22/2018    Procedure: COLONOSCOPY;  colonoscopy;  Surgeon: Hugo Patel MD;  [facility]     ENDOSCOPIC RETROGRADE CHOLANGIOPANCREATOGRAM N/A 2/10/2020    Procedure: ENDOSCOPIC RETROGRADE CHOLANGIOPANCREATOGRAPHY with spinchterotomy;  Surgeon: Guru Rigoberto Canada MD;  Location: [facility]     ENDOSCOPIC RETROGRADE CHOLANGIOPANCREATOGRAM N/A 5/13/2020    Procedure: ENDOSCOPIC RETROGRADE CHOLANGIOPANCREATOGRAPHY,Stent exchange and sludge removal;  Surgeon: Guru Rigoberto Canada MD;  Location: [facility] OR     ENDOSCOPIC RETROGRADE CHOLANGIOPANCREATOGRAPHY, EXCHANGE TUBE/STENT N/A 3/4/2020    Procedure: ENDOSCOPIC RETROGRADE CHOLANGIOPANCREATOGRAPHY, WITH biliary dilarion, stent exchange, stone removal;  Surgeon: Guru Rigoberto Canada MD;  Location: [facility] OR     ESOPHAGOSCOPY, GASTROSCOPY, DUODENOSCOPY (EGD), COMBINED " N/A 10/31/2019    Procedure: Esophagogastroduodenoscopy, With Biopsy;  Surgeon: Nerissa Aranda MD;  Location: [facility]     IR FEEDING TUBE PLACEMENT MERCEDEZ PRESTON/MD  2019     IR FLUORO 0-1 HOUR  2019     IR LUMBAR PUNCTURE  2019     TRANSPLANT LIVER RECIPIENT  DONOR N/A 2019    Procedure: TRANSPLANT, LIVER, RECIPIENT,  DONOR;  Surgeon: Mandeep Alford MD;  Location: [facility]      Allergies   Allergen Reactions     Ciprofloxacin Dizziness and Nausea     Food      Fruits with cores and pits  Apples     Sulfa Drugs Unknown     Swollen joints     Furosemide Rash        Anesthesia Evaluation     . Pt has had prior anesthetic. Type: General           ROS/MED HX    ENT/Pulmonary:  - neg pulmonary ROS     Neurologic: Comment: Vertigo    (+)neuropathy - Peripheral neuropathy,     Cardiovascular:     (+) hypertension----. : . . . :. .       METS/Exercise Tolerance:     Hematologic: Comments: Immunosuppressed status (H)    (+) Anemia, -      Musculoskeletal: Comment: Chronic pain of both knees   (+) arthritis (Osteoarthritis),  -       GI/Hepatic:     (+) GERD Asymptomatic on medication, liver disease (Hx of ANGULO and Heterozygous alpha 1 antitrypsin deficiency s/p Liver Transplant 19.),       Renal/Genitourinary:     (+) chronic renal disease, type: CRI, Pt does not require dialysis, Pt has no history of transplant,       Endo: Comment: Steroid-induced hyperglycemia    (+) thyroid problem hypothyroidism, .      Psychiatric:  - neg psychiatric ROS       Infectious Disease:  - neg infectious disease ROS       Malignancy:      - no malignancy   Other: Comment: Acquired lymphedema of leg  Venous hypertension of lower extremity, bilateral  Cramp in lower extremity associated with sleep      Heterozygous alpha 1-antitrypsin deficiency (H)    Vitamin D deficiency  Zinc deficiency       (+) H/O Chronic Pain,                       PHYSICAL EXAM:   Mental Status/Neuro:    Airway: Facies:  Feasible  Mallampati: II  Mouth/Opening: Full  TM distance: > 6 cm   Respiratory: Auscultation: CTAB     Resp. Rate: Normal     Resp. Effort: Normal      CV: Rhythm: Regular  Heart: Murmur (Diastolic; soft)  Edema: RLE; LLE   Comments:                      Assessment:   ASA SCORE: 3    H&P: History and physical reviewed and following examination; no interval change.         Plan:   Anes. Type:  Spinal; MAC; Peripheral Nerve Block; For Post-op pain in coordination with surgeon     Block Details: Single Shot; Adductor C.   Pre-Medication: None   Induction:  N/a   Airway: Native Airway   Access/Monitoring: PIV   Maintenance: N/a     Postop Plan:   Postop Pain: Regional  Postop Sedation/Airway: Not planned     PONV Management: Adult Risk Factors: Female     CONSENT: Direct conversation   Plan and risks discussed with: Patient   Blood Products: Consented (ALL Blood Products)       Comments for Plan/Consent:  59 yo for Right  total knee arthroplasty (Right Knee) under Spinal/MAC/RA/GETA backup.  Anesthesia risks and benefits discussed. Questions answered. Patient understands and agrees to proceed with anesthesia plan.                    Awais Paz MD

## 2020-10-23 NOTE — ANESTHESIA CARE TRANSFER NOTE
Patient: Nicci Pemberton    Procedure(s):  Right  total knee arthroplasty    Diagnosis: Chronic pain of both knees [M25.561, M25.562, G89.29]  Diagnosis Additional Information: No value filed.    Anesthesia Type:   Spinal, MAC, Peripheral Nerve Block, For Post-op pain in coordination with surgeon     Note:  Airway :Nasal Cannula  Patient transferred to:PACU  Comments: Transfer to PACU for recovery.  Monitors placed.  VSS noted.  Report to RN.  Patient continuing to have pain at a 7.  Adductor Canal block planning to be given soon.  Handoff Report: Identifed the Patient, Identified the Reponsible Provider, Reviewed the pertinent medical history, Discussed the surgical course, Reviewed Intra-OP anesthesia mangement and issues during anesthesia, Set expectations for post-procedure period and Allowed opportunity for questions and acknowledgement of understanding      Vitals: (Last set prior to Anesthesia Care Transfer)    CRNA VITALS  10/23/2020 1604 - 10/23/2020 1704      10/23/2020             NIBP:  99/66    Pulse:  60    NIBP Mean:  70    Temp:  36.6  C (97.9  F)    SpO2:  99 %    Resp Rate (observed):  10    EKG:  Sinus rhythm                Electronically Signed By: SANTINO ISBELL CRNA  October 23, 2020  5:10 PM

## 2020-10-23 NOTE — ANESTHESIA PROCEDURE NOTES
Spinal/LP Procedure Note    Spinal Block    Procedure Start/Stop Times:      10/23/2020 1:12 PM

## 2020-10-23 NOTE — OR NURSING
"Right adductor canal block placed by MDA at bedside.  Pt stating \"pain is going down now\".  Pt falling back to sleep.  Wakes up with alarm going off to take a breath.  Pt asking \"if the pain will go down more in knee\".  Falls back to sleep with periods of apnea.  "

## 2020-10-24 ENCOUNTER — APPOINTMENT (OUTPATIENT)
Dept: PHYSICAL THERAPY | Facility: CLINIC | Age: 60
DRG: 470 | End: 2020-10-24
Attending: ORTHOPAEDIC SURGERY
Payer: COMMERCIAL

## 2020-10-24 ENCOUNTER — APPOINTMENT (OUTPATIENT)
Dept: PHYSICAL THERAPY | Facility: CLINIC | Age: 60
DRG: 470 | End: 2020-10-24
Attending: STUDENT IN AN ORGANIZED HEALTH CARE EDUCATION/TRAINING PROGRAM
Payer: COMMERCIAL

## 2020-10-24 ENCOUNTER — APPOINTMENT (OUTPATIENT)
Dept: OCCUPATIONAL THERAPY | Facility: CLINIC | Age: 60
DRG: 470 | End: 2020-10-24
Attending: ORTHOPAEDIC SURGERY
Payer: COMMERCIAL

## 2020-10-24 LAB
ALBUMIN SERPL-MCNC: 2.4 G/DL (ref 3.4–5)
ALP SERPL-CCNC: 90 U/L (ref 40–150)
ALT SERPL W P-5'-P-CCNC: 18 U/L (ref 0–50)
ANION GAP SERPL CALCULATED.3IONS-SCNC: 8 MMOL/L (ref 3–14)
AST SERPL W P-5'-P-CCNC: 20 U/L (ref 0–45)
BILIRUB DIRECT SERPL-MCNC: 0.2 MG/DL (ref 0–0.2)
BILIRUB SERPL-MCNC: 1.2 MG/DL (ref 0.2–1.3)
BUN SERPL-MCNC: 25 MG/DL (ref 7–30)
CALCIUM SERPL-MCNC: 7.9 MG/DL (ref 8.5–10.1)
CHLORIDE SERPL-SCNC: 107 MMOL/L (ref 94–109)
CO2 SERPL-SCNC: 22 MMOL/L (ref 20–32)
CREAT SERPL-MCNC: 0.95 MG/DL (ref 0.52–1.04)
ERYTHROCYTE [DISTWIDTH] IN BLOOD BY AUTOMATED COUNT: 11.9 % (ref 10–15)
GFR SERPL CREATININE-BSD FRML MDRD: 65 ML/MIN/{1.73_M2}
GLUCOSE SERPL-MCNC: 117 MG/DL (ref 70–99)
HCT VFR BLD AUTO: 27.2 % (ref 35–47)
HGB BLD-MCNC: 8.9 G/DL (ref 11.7–15.7)
MCH RBC QN AUTO: 33.5 PG (ref 26.5–33)
MCHC RBC AUTO-ENTMCNC: 32.7 G/DL (ref 31.5–36.5)
MCV RBC AUTO: 102 FL (ref 78–100)
PLATELET # BLD AUTO: 114 10E9/L (ref 150–450)
POTASSIUM SERPL-SCNC: 5.3 MMOL/L (ref 3.4–5.3)
PROT SERPL-MCNC: 5.1 G/DL (ref 6.8–8.8)
RBC # BLD AUTO: 2.66 10E12/L (ref 3.8–5.2)
SODIUM SERPL-SCNC: 137 MMOL/L (ref 133–144)
WBC # BLD AUTO: 10.4 10E9/L (ref 4–11)

## 2020-10-24 PROCEDURE — 97530 THERAPEUTIC ACTIVITIES: CPT | Mod: GO | Performed by: OCCUPATIONAL THERAPIST

## 2020-10-24 PROCEDURE — 97165 OT EVAL LOW COMPLEX 30 MIN: CPT | Mod: GO | Performed by: OCCUPATIONAL THERAPIST

## 2020-10-24 PROCEDURE — 250N000011 HC RX IP 250 OP 636: Performed by: STUDENT IN AN ORGANIZED HEALTH CARE EDUCATION/TRAINING PROGRAM

## 2020-10-24 PROCEDURE — 97530 THERAPEUTIC ACTIVITIES: CPT | Mod: GP | Performed by: CLINIC/CENTER

## 2020-10-24 PROCEDURE — 36415 COLL VENOUS BLD VENIPUNCTURE: CPT | Performed by: INTERNAL MEDICINE

## 2020-10-24 PROCEDURE — 250N000013 HC RX MED GY IP 250 OP 250 PS 637: Performed by: STUDENT IN AN ORGANIZED HEALTH CARE EDUCATION/TRAINING PROGRAM

## 2020-10-24 PROCEDURE — 85027 COMPLETE CBC AUTOMATED: CPT | Performed by: INTERNAL MEDICINE

## 2020-10-24 PROCEDURE — 99232 SBSQ HOSP IP/OBS MODERATE 35: CPT | Performed by: INTERNAL MEDICINE

## 2020-10-24 PROCEDURE — 97110 THERAPEUTIC EXERCISES: CPT | Mod: GP | Performed by: CLINIC/CENTER

## 2020-10-24 PROCEDURE — 80048 BASIC METABOLIC PNL TOTAL CA: CPT | Performed by: INTERNAL MEDICINE

## 2020-10-24 PROCEDURE — 97161 PT EVAL LOW COMPLEX 20 MIN: CPT | Mod: GP | Performed by: CLINIC/CENTER

## 2020-10-24 PROCEDURE — 80076 HEPATIC FUNCTION PANEL: CPT | Performed by: INTERNAL MEDICINE

## 2020-10-24 PROCEDURE — 250N000012 HC RX MED GY IP 250 OP 636 PS 637: Performed by: INTERNAL MEDICINE

## 2020-10-24 PROCEDURE — 258N000003 HC RX IP 258 OP 636: Performed by: STUDENT IN AN ORGANIZED HEALTH CARE EDUCATION/TRAINING PROGRAM

## 2020-10-24 PROCEDURE — 250N000013 HC RX MED GY IP 250 OP 250 PS 637: Performed by: INTERNAL MEDICINE

## 2020-10-24 PROCEDURE — 97116 GAIT TRAINING THERAPY: CPT | Mod: GP | Performed by: CLINIC/CENTER

## 2020-10-24 PROCEDURE — 97535 SELF CARE MNGMENT TRAINING: CPT | Mod: GO | Performed by: OCCUPATIONAL THERAPIST

## 2020-10-24 RX ORDER — SODIUM CHLORIDE, SODIUM LACTATE, POTASSIUM CHLORIDE, CALCIUM CHLORIDE 600; 310; 30; 20 MG/100ML; MG/100ML; MG/100ML; MG/100ML
INJECTION, SOLUTION INTRAVENOUS
Status: DISPENSED
Start: 2020-10-24 | End: 2020-10-24

## 2020-10-24 RX ORDER — ASPIRIN 81 MG/1
162 TABLET, CHEWABLE ORAL 2 TIMES DAILY
Status: DISCONTINUED | OUTPATIENT
Start: 2020-10-24 | End: 2020-10-26 | Stop reason: HOSPADM

## 2020-10-24 RX ADMIN — CEFAZOLIN SODIUM 2 G: 2 INJECTION, SOLUTION INTRAVENOUS at 06:46

## 2020-10-24 RX ADMIN — APIXABAN 2.5 MG: 2.5 TABLET, FILM COATED ORAL at 10:17

## 2020-10-24 RX ADMIN — OXYCODONE HYDROCHLORIDE 5 MG: 5 TABLET ORAL at 00:12

## 2020-10-24 RX ADMIN — CYCLOSPORINE 75 MG: 25 CAPSULE, LIQUID FILLED ORAL at 10:18

## 2020-10-24 RX ADMIN — LEVOTHYROXINE SODIUM 125 MCG: 25 TABLET ORAL at 06:45

## 2020-10-24 RX ADMIN — ACETAMINOPHEN 975 MG: 325 TABLET, FILM COATED ORAL at 11:43

## 2020-10-24 RX ADMIN — ASPIRIN 81 MG CHEWABLE TABLET 162 MG: 81 TABLET CHEWABLE at 19:06

## 2020-10-24 RX ADMIN — SODIUM CHLORIDE, POTASSIUM CHLORIDE, SODIUM LACTATE AND CALCIUM CHLORIDE: 600; 310; 30; 20 INJECTION, SOLUTION INTRAVENOUS at 04:23

## 2020-10-24 RX ADMIN — OXYCODONE HYDROCHLORIDE 5 MG: 5 TABLET ORAL at 14:58

## 2020-10-24 RX ADMIN — DOCUSATE SODIUM 100 MG: 100 CAPSULE, LIQUID FILLED ORAL at 09:28

## 2020-10-24 RX ADMIN — OXYCODONE HYDROCHLORIDE 10 MG: 10 TABLET ORAL at 19:06

## 2020-10-24 RX ADMIN — DOCUSATE SODIUM 100 MG: 100 CAPSULE, LIQUID FILLED ORAL at 19:06

## 2020-10-24 RX ADMIN — OXYCODONE HYDROCHLORIDE 10 MG: 10 TABLET ORAL at 04:20

## 2020-10-24 RX ADMIN — ONDANSETRON 4 MG: 4 TABLET, ORALLY DISINTEGRATING ORAL at 09:29

## 2020-10-24 RX ADMIN — CEFAZOLIN SODIUM 2 G: 2 INJECTION, SOLUTION INTRAVENOUS at 00:05

## 2020-10-24 RX ADMIN — SENNOSIDES AND DOCUSATE SODIUM 1 TABLET: 8.6; 5 TABLET ORAL at 09:29

## 2020-10-24 RX ADMIN — OXYCODONE HYDROCHLORIDE 5 MG: 5 TABLET ORAL at 10:23

## 2020-10-24 RX ADMIN — SENNOSIDES AND DOCUSATE SODIUM 1 TABLET: 8.6; 5 TABLET ORAL at 19:06

## 2020-10-24 RX ADMIN — FAMOTIDINE 20 MG: 20 TABLET ORAL at 09:29

## 2020-10-24 RX ADMIN — FAMOTIDINE 20 MG: 20 TABLET ORAL at 19:06

## 2020-10-24 RX ADMIN — OXYCODONE HYDROCHLORIDE 10 MG: 10 TABLET ORAL at 23:28

## 2020-10-24 RX ADMIN — ONDANSETRON 4 MG: 4 TABLET, ORALLY DISINTEGRATING ORAL at 16:50

## 2020-10-24 RX ADMIN — ACETAMINOPHEN 975 MG: 325 TABLET, FILM COATED ORAL at 04:20

## 2020-10-24 RX ADMIN — CYCLOSPORINE 75 MG: 25 CAPSULE, LIQUID FILLED ORAL at 21:37

## 2020-10-24 NOTE — PLAN OF CARE
"  VS: /77 (BP Location: Left arm)   Pulse 56   Temp 98  F (36.7  C) (Oral)   Resp 17   Ht 1.575 m (5' 2.01\")   Wt 93 kg (205 lb 0.4 oz)   SpO2 99%   BMI 37.49 kg/m     Denies chest pain and SOB  Alert and oriented. Calls appropriately   O2: >90% on RA   Output: Voids spontaneously via toilet   Last BM: 10/22/20  Bowel sounds active   Activity: Ax1 with gait belt and walker   Skin: Incision to R knee   Pain: Managed w/ 5mg oxy q4hrs   CMS: Intact. Denies numbness and tingling   Dressing: ABD reinforced to ace wrap - CDI   Diet: Regular   LDA: PIV R arm - SL, PIV L hand   Equipment: Personal belongings, walker   Plan: Continue to monitor. Plan to discharge home tomorrow.    Additional Info: Intermittent nausea throughout shift. Zofran given x1      "

## 2020-10-24 NOTE — PROGRESS NOTES
"VS: /59 (BP Location: Left arm)   Pulse 68   Temp 97.7  F (36.5  C) (Oral)   Resp 16   Ht 1.575 m (5' 2.01\")   Wt 93 kg (205 lb 0.4 oz)   SpO2 96%   BMI 37.49 kg/m   pt denies CP or SOB. Alert and oriented.   O2: Room air sat. > 90%.    Output: Voiding adequate amount in bathroom without difficulty.   Last BM: 10/22/20 Passing flatus.   Activity: SBA 1 with walker and galt belt   Skin: Intact.Ex right knee   Pain: Tolerating Tylenol, oxycodone and dilaudid for pain   CMS: CMS and neuro intact.Denies numbness and tingling.   Dressing: CDI. Reinforced with ABD with previous nurse.   Diet: Tolerating regular diet   LDA: PIV infusing.   Equipment: Canol, walker,IV pole, and personal belongings.   Plan: Continue with with plan of care. Able to make need known. Call light within reach.   Additional Info:       "

## 2020-10-24 NOTE — PROGRESS NOTES
"   10/24/20 1021   Quick Adds   Type of Visit Initial Occupational Therapy Evaluation   Living Environment   People in home spouse   Current Living Arrangements house   Home Accessibility stairs to enter home;stairs within home   Number of Stairs, Main Entrance 2   Stair Railings, Main Entrance none   Number of Stairs, Within Home, Primary 6   Stair Railings, Within Home, Primary railings on both sides of stairs   Transportation Anticipated family or friend will provide   Living Environment Comments Pt lives in a split level home. Tub shower with shower chair and grab bars to be put in. Also has RTS. Pt also [place] There is a tub shower and stairs at this house as well.   Self-Care   Usual Activity Tolerance good   Current Activity Tolerance moderate   Regular Exercise No   Equipment Currently Used at Home cane, straight;walker, standard;raised toilet seat;shower chair;grab bar, toilet;grab bar, tub/shower   Activity/Exercise/Self-Care Comment Pt reports IND w/ all self-cares. Did need help from spouse doing stairs without a railing as a safety precaution.   Disability/Function   Hearing Difficulty or Deaf no   Wear Glasses or Blind no   Concentrating, Remembering or Making Decisions Difficulty no   Difficulty Communicating no   Difficulty Eating/Swallowing no   Walking or Climbing Stairs Difficulty yes   Dressing/Bathing Difficulty no   Toileting no   Fall history within last six months no   General Information   Onset of Illness/Injury or Date of Surgery 10/23/20   Referring Physician Dr. Wallace   Patient/Family Therapy Goal Statement (OT) \"I want to be able to walk without a cane and not be in so much pain.\"   Additional Occupational Profile Info/Pertinent History of Current Problem s/p R TKA; history of liver transfplant. see chart for further PMH.   Performance Patterns (Routines, Roles, Habits) cooking    Existing Precautions/Restrictions no known " precautions/restrictions   Left Upper Extremity (Weight-bearing Status) full weight-bearing (FWB)   Right Upper Extremity (Weight-bearing Status) full weight-bearing (FWB)   Left Lower Extremity (Weight-bearing Status) full weight-bearing (FWB)   Right Lower Extremity (Weight-bearing Status) weight-bearing as tolerated (WBAT)   Cognitive Status Examination   Orientation Status orientation to person, place and time   Visual Perception   Visual Impairment/Limitations WNL   Sensory   Sensory Comments No N/T noted   Pain Assessment   Patient Currently in Pain Yes, see Vital Sign flowsheet   Range of Motion Comprehensive   Comment, General Range of Motion BUE WFL   Strength Comprehensive (MMT)   Comment, General Manual Muscle Testing (MMT) Assessment General deconditioning   Muscle Tone Assessment   Muscle Tone Quick Adds No deficits were identified   Coordination   Upper Extremity Coordination No deficits were identified   Bed Mobility   Bed Mobility supine-sit;sit-supine   Supine-Sit [place] (Bed Mobility) supervision   Sit-Supine [place] (Bed Mobility) supervision   Transfers   Transfers toilet transfer;sit-stand transfer   Sit-Stand Transfer   Sit-Stand [place] (Transfers) supervision   Assistive Device (Sit-Stand Transfers) walker, standard   Toilet Transfer   Type (Toilet Transfer) sit-stand;stand-sit   [place] Level (Toilet Transfer) supervision;verbal cues   Assistive Device (Toilet Transfer) walker, standard   Balance   Balance Assessment no deficits were identified   Activities of Daily Living   BADL Assessment/Intervention bathing;upper body dressing;lower body dressing;grooming;feeding;toileting   Bathing Assessment/Intervention   [place] Level (Bathing) contact guard assist   Assistive Devices (Bathing) shower chair   Upper Body Dressing Assessment/Training   [place] Level (Upper Body Dressing) supervision   Lower Body Dressing Assessment/Training   [place] Level (Lower  "Body Dressing) supervision   Grooming Assessment/Training   [place] Level (Grooming) supervision   Eating/Self Feeding   [place] Level (Feeding) independent   Toileting   [place] Level (Toileting) supervision   Clinical Impression   Criteria for Skilled Therapeutic Interventions Met (OT) yes   OT Diagnosis Impaired ADLs and functional mobility   OT Problem List-Impairments impacting ADL pain   ADL comments/analysis Pt demonstrates SBA with basic ADLs and mobilizes SBA w/ FWW.   Assessment of Occupational Performance 1-3 Performance Deficits   Identified Performance Deficits dressing, toileting, bathing   Planned Therapy Interventions (OT) ADL retraining   Clinical Decision Making Complexity (OT) low complexity   Therapy Frequency (OT) Daily   Predicted Duration of Therapy 1x eval and treat   Anticipated Equipment Needs Upon Discharge (OT) shower chair;raised toilet seat;reacher   Risks and Benefits of Treatment have been explained. Yes   Patient, Family & other staff in agreement with plan of care Yes   OT Discharge Planning    OT Discharge Recommendation (DC Rec) Home with assist   OT Rationale for DC Rec Pt has met all OT goals, no further skilled OT needs at this time. Home with assist from spouse.   OT Brief overview of current status  SBA ADLs   Therapy Certification   Start of Care Date 10/24/20   Certification date from 10/24/20   Certification date to 10/24/20   Medical Diagnosis R TKA   [facility] AM-PAC TM \"6 Clicks\"   2016, Trustees of [facility], under license to [employer].  All rights reserved.   6 Clicks Short Forms Daily Activity Inpatient Short Form   [facility] AM-PAC  \"6 Clicks\" Daily Activity Inpatient Short Form   1. Putting on and taking off regular lower body clothing? 4 - None   2. Bathing (including washing, rinsing, drying)? 3 - A Little   3. Toileting, which includes using toilet, bedpan or urinal? 4 - None   4. Putting on and taking off regular upper " body clothing? 4 - None   5. Taking care of personal grooming such as brushing teeth? 4 - None   6. Eating meals? 4 - None   Daily Activity Raw Score (Score out of 24.Lower scores equate to lower levels of function) 23   Total Evaluation Time (Minutes)   Total Evaluation Time (Minutes) 8

## 2020-10-24 NOTE — PLAN OF CARE
Occupational Therapy Discharge Summary    Reason for therapy discharge:    All goals and outcomes met, no further needs identified.    Progress towards therapy goal(s). See goals on Care Plan in [facility] electronic health record for goal details.  Goals met    Therapy recommendation(s):    No further therapy is recommended. Home with assist.

## 2020-10-24 NOTE — PROGRESS NOTES
[facility]      OUTPATIENT OCCUPATIONAL THERAPY  EVALUATION  PLAN OF TREATMENT FOR OUTPATIENT REHABILITATION  (COMPLETE FOR INITIAL CLAIMS ONLY)  Patient's Last Name, First Name, M.I.  YOB: 1960  Nicci Pemberton                          Provider's Name  [facility] Medical Record No.  [id number]                               Onset Date:  10/23/20   Start of Care Date:  10/24/20     Type:     ___PT   _X_OT   ___SLP Medical Diagnosis:  R TKA                        OT Diagnosis:  Impaired ADLs and functional mobility   Visits from SOC:  1   _________________________________________________________________________________  Plan of Treatment/Functional Goals    Planned Interventions: ADL retraining   Goals: See Occupational Therapy Goals on Care Plan in [employer] [facility] electronic health record.    Therapy Frequency: Daily  Predicted Duration of Therapy Intervention: 1x eval and treat  _________________________________________________________________________________    I CERTIFY THE NEED FOR THESE SERVICES FURNISHED UNDER        THIS PLAN OF TREATMENT AND WHILE UNDER MY CARE     (Physician co-signature of this document indicates review and certification of the therapy plan).                Certification date from: 10/24/20, Certification date to: 10/24/20    Referring Physician: Dr. Wallace            Initial Assessment        See Occupational Therapy evaluation dated 10/24/20 in Epic electronic health record.

## 2020-10-24 NOTE — OR NURSING
PACU to Inpatient Nursing Handoff    Patient Nicci Pemberton is a 60 year old female who speaks English.   Procedure Procedure(s):  Right  total knee arthroplasty   Surgeon(s) Primary: Mario Wallace MD     Allergies   Allergen Reactions     Ciprofloxacin Dizziness and Nausea     Food      Fruits with cores and pits  Apples     Sulfa Drugs Unknown     Swollen joints     Furosemide Rash       Isolation  [unfilled]     Past Medical History   has a past medical history of Anemia, Cellulitis, Cirrhosis of liver (H) (6/18/2018), Heterozygous alpha 1-antitrypsin deficiency (H), High hepatic iron concentration determined by biopsy of liver, Liver cirrhosis secondary to ANGULO (H), Liver transplanted (H) (08/18/2019), Lymphedema, Osteoarthritis, and SBP (spontaneous bacterial peritonitis) (H) (8/2/2019).    Anesthesia General   Dermatome Level     Preop Meds TXA - time given: 1225   Nerve block Adductor canal.  Location:right. Med:bupivacaine. Time given: 1715   Intraop Meds dexamethasone (Decadron)  dexmedetomidine (Precedex): 40 mcg total  fentanyl (Sublimaze): 200 mcg total  hydromorphone (Dilaudid): 1 mg total  ondansetron (Zofran): last given at 1618  versed 4mg, phenylephrine   Local Meds Yes - Local Cocktail (morphine, ropivacaine, epinephrine, Toradol)   Antibiotics cefazolin (Ancef) - last given at 1530     Pain Patient Currently in Pain: yes  Comfort: tolerable with discomfort  Pain Control: partially effective   PACU meds  acetaminophen (Tylenol): 975 mg (total dose) last given at 1911   oxycodone (Roxicodone): 5 mg (total dose) last given at 1911    PCA / epidural No   Capnography Respiratory Monitoring (EtCO2): 33 mmHg  Integrated Pulmonary Index (IPI): 8-9   Telemetry ECG Rhythm: Sinus rhythm   Inpatient Telemetry Monitor Ordered? No        Labs Glucose Lab Results   Component Value Date    GLC 92 10/23/2020    GLC 87 10/23/2020       Hgb Lab Results   Component Value Date    HGB 12.8 10/23/2020       INR Lab  Results   Component Value Date    INR 0.97 10/23/2020      PACU Imaging Not applicable     Wound/Incision Incision/Surgical Site 10/23/20 Right Knee (Active)   Incision Assessment UTV 10/23/20 1743   Closure BLAIRE 10/23/20 1743   Incision Drainage Amount UTV 10/23/20 1743   Dressing Intervention Clean, dry, intact 10/23/20 1743   Number of days: 0      CMS        Equipment ice pack   Other LDA       IV Access Peripheral IV 10/23/20 Right Hand (Active)   Site Assessment WDL 10/23/20 1845   Line Status Infusing 10/23/20 1845   Dressing Intervention New dressing  10/23/20 1152   Phlebitis Scale 0-->no symptoms 10/23/20 1845   Infiltration Scale 0 10/23/20 1845   Infiltration Site Treatment Method  None 10/23/20 1643   Number of days: 0       Peripheral IV Left Hand (Active)   Site Assessment WDL 10/23/20 1845   Line Status Saline locked 10/23/20 1845   Phlebitis Scale 0-->no symptoms 10/23/20 1643   Infiltration Scale 0 10/23/20 1643   Infiltration Site Treatment Method  None 10/23/20 1643   Number of days:       Blood Products Not applicable    mL   Intake/Output Date 10/23/20 0700 - 10/24/20 0659   Shift 9186-7809 0833-7385 4683-8174 24 Hour Total   INTAKE   P.O.  240  240   I.V. 1000 1266  2266   Shift Total(mL/kg) 1000(10.75) 1506(16.19)  2506(26.95)   OUTPUT   Blood  200  200   Shift Total(mL/kg)  200(2.15)  200(2.15)   Weight (kg) 93 93 93 93      Drains / [place]     Time of void PreOp Void Prior to Procedure: 1115 (10/23/20 1117)    PostOp      Diapered? No   Bladder Scan Bladder Scan Volume (mL): 133 ml (10/23/20 1805)    mL(water, ginger ale) (10/23/20 1930)  crackers and ginger ale, water     Vitals    B/P: (!) 143/80  T: 97.7  F (36.5  C)    Temp src: Oral  P:  Pulse: 67 (10/23/20 1915)          R: 10  O2:  SpO2: 99 %    O2 Device: Nasal cannula (10/23/20 1915)    Oxygen Delivery: 1 LPM (10/23/20 1915)         Family/support present  updated by patient   Patient belongings     Patient  transported on bed   DC meds/scripts (obs/outpt) Not applicable   Inpatient Pain Meds Released? Yes       Special needs/considerations Max Tylenol dose 2000mg daily   Tasks needing completion None       Julia Katz RN  [facility] [phone]

## 2020-10-24 NOTE — PROGRESS NOTES
"Seen and examined patient  Doing well postoperatively. NO fevers or chills  Eating and drinking well  Pain well controlled  /77 (BP Location: Left arm)   Pulse 56   Temp 98  F (36.7  C) (Oral)   Resp 17   Ht 1.575 m (5' 2.01\")   Wt 93 kg (205 lb 0.4 oz)   SpO2 99%   BMI 37.49 kg/m    CVS: Normal Heart sounds   RS: Clear breath sounds bilaterally   Abdomen: Soft and non tender. Normal bowel sounds.  Neuro: Alert and orientated X 3   MSK: Rt knee with dressings with some soakage of blood. No distal neurovascular deficit     Labs reviewed. Post op hgb at 8.9    Nicci Pemberton is a 60 year old female admitted on 10/23/2020 s/p Rt TKA. She has a know h/o S/P Liver transplant for ANGULO. Internal Medicine following for postoperative co management      #S/P Rt Knee arthroplasty, POD#1:     Post Op Management per Primary team.   Stop IV fluids   Post op capnography per protocol.   Pain control- per Primary team    Minimize use of narcotics as able. Consider bowel regimen with narcotics.   Encourage Incentive spirometry to prevent atelectasis.  DVT prophylaxis- per Primary team - I discussed this with a team, given that patient has borderline low platelets, and she has tolerated aspirin in the past, consider aspirin 162 mg   Activity, antibiotics, wound and/or drain care - per Primary team.      Anemia of acute blood loss:  Monitor hgb for anemia of acute blood loss. Transfuse for hgb <7.0    History of liver transplant:  Continue prior cyclosporine 75 mg twice daily  LFT this morning within acceptable limits      Hypothyroidism:   Continue prior levothyroxine 125 mcg daily     Gastroesophageal reflux disease:  Continue prior famotidine 20 mg twice daily as needed     CKD:   Recent creatinine ranges between 1.1-1.2.  Stable BMP       "

## 2020-10-24 NOTE — PROGRESS NOTES
10/24/20 0858   Quick Adds   Type of Visit Initial PT Evaluation       Present no   Language English   Living Environment   People in home spouse   Current Living Arrangements house   Home Accessibility stairs to enter home;stairs within home   Number of Stairs, Main Entrance 2   Stair Railings, Main Entrance none   Number of Stairs, Within Home, Primary 6  (2 flights)   Stair Railings, Within Home, Primary railings on both sides of stairs   Transportation Anticipated family or friend will provide   Self-Care   Usual Activity Tolerance good   Current Activity Tolerance moderate   Regular Exercise No   Equipment Currently Used at Home cane, straight;walker, standard;grab bar, toilet;grab bar, tub/shower;raised toilet seat;shower chair   Disability/Function   Hearing Difficulty or Deaf no   Wear Glasses or Blind no   Concentrating, Remembering or Making Decisions Difficulty no   Difficulty Communicating no   Difficulty Eating/Swallowing no   Walking or Climbing Stairs Difficulty yes   Walking or Climbing Stairs ambulation difficulty, requires equipment;stair climbing difficulty, requires equipment   Dressing/Bathing Difficulty yes   Dressing/Bathing bathing difficulty, requires equipment   Dressing/Bathing Management pt uses shower chair in walk in shower for bathing   Toileting no   Doing Errands Independently Difficulty (such as shopping) no   Fall history within last six months no   Change in Functional Status Since Onset of Current Illness/Injury yes   General Information   Onset of Illness/Injury or Date of Surgery 10/23/20   Referring Physician Mario Wallace MD   Patient/Family Therapy Goals Statement (PT) to be painfree   Pertinent History of Current Problem (include personal factors and/or comorbidities that impact the POC) Pt is a 60 year old female s/p R TKA   Existing Precautions/Restrictions no known precautions/restrictions   Weight-Bearing Status - LUE full weight-bearing    Weight-Bearing Status - RUE full weight-bearing   Weight-Bearing Status - LLE full weight-bearing   Weight-Bearing Status - RLE weight-bearing as tolerated   General Observations IV, capno   Cognition   Orientation Status (Cognition) oriented x 4   Affect/Mental Status (Cognition) WNL   Follows Commands (Cognition) WNL   Pain Assessment   Patient Currently in Pain Yes, see Vital Sign flowsheet   Integumentary/Edema   Integumentary/Edema Comments incision not observed, dressing and ACE wrap in place on RLE   Posture    Posture Protracted shoulders   Range of Motion (ROM)   ROM Comment R knee ROM limited s/p R TKA, all other ROM WFL   Strength   Strength Comments R LE strength impaired secondary to R TKA   Bed Mobility   Bed Mobility scooting/bridging;supine-sit;sit-supine   Scooting/Bridging [place] (Bed Mobility) independent   Supine-Sit [place] (Bed Mobility) modified independence   Sit-Supine [place] (Bed Mobility) modified independence   Impairments Contributing to Impaired Bed Mobility pain;decreased ROM   Assistive Device (Bed Mobility) bed rails   Transfers   Transfers sit-stand transfer;toilet transfer   Impairments Contributing to Impaired Transfers pain;decreased ROM   Sit-Stand Transfer   Sit-Stand [place] (Transfers) supervision;verbal cues   Assistive Device (Sit-Stand Transfers) walker, standard   Toilet Transfer   Type (Toilet Transfer) sit-stand   [place] Level (Toilet Transfer) supervision;verbal cues   Assistive Device (Toilet Transfer) walker, standard   Gait/Stairs (Locomotion)   [place] Level (Gait) supervision;set up   Assistive Device (Gait) walker, standard   Distance in Feet (Required for LE Total Joints) 60ft   Pattern (Gait) step-to   Deviations/Abnormal Patterns (Gait) gait speed decreased;stride length decreased;weight shifting decreased   Maintains Weight-bearing Status (Gait) able to maintain   Comment (Gait/Stairs) did not assess stairs   Balance  "  Balance no deficits were identified   Balance Comments good static sitting and standing balance, good dynamic balance walking with FWW   Sensory Examination   Sensory Perception patient reports no sensory changes   Coordination   Coordination no deficits were identified   Muscle Tone   Muscle Tone no deficits were identified   Clinical Impression   Criteria for Skilled Therapeutic Intervention yes, treatment indicated   PT Diagnosis (PT) impaired functional mobility   Influenced by the following impairments s/p R TKA, pain, decreased ROM   Functional limitations due to impairments impaired bed mobility, transfers and gait   Clinical Presentation Stable/Uncomplicated   Clinical Presentation Rationale overall pt is mobilizing appropriately   Clinical Decision Making (Complexity) low complexity   Therapy Frequency (PT) 2x/day   Predicted Duration of Therapy Intervention (days/wks) 2 days   Planned Therapy Interventions (PT) bed mobility training;balance training;gait training;home exercise program;patient/family education;ROM (range of motion);stair training;strengthening;stretching;transfer training   Anticipated Equipment Needs at Discharge (PT) walker, standard  (pt owns FWW and cane)   Risk & Benefits of therapy have been explained evaluation/treatment results reviewed;care plan/treatment goals reviewed;risks/benefits reviewed;current/potential barriers reviewed;participants voiced agreement with care plan;patient   PT Discharge Planning    PT Discharge Recommendation (DC Rec) home with outpatient physical therapy   PT Rationale for DC Rec Pt is mobilizing appropriately s/p R TKA, will benefit from OP PT for progression of strength, ROM and functional mobility   PT Brief overview of current status  [place]endy bed mobility, SBA for transfers and gait with FWW   [facility] AM-PAC TM \"6 Clicks\"   2016, Trustees of [facility], under license to [employer].  All rights reserved.   6 Clicks Short Forms Basic " "Mobility Inpatient Short Form   [facility] AM-PAC  \"6 Clicks\" V.2 Basic Mobility Inpatient Short Form   1. Turning from your back to your side while in a flat bed without using bedrails? 4 - None   2. Moving from lying on your back to sitting on the side of a flat bed without using bedrails? 4 - None   3. Moving to and from a bed to a chair (including a wheelchair)? 4 - None   4. Standing up from a chair using your arms (e.g., wheelchair, or bedside chair)? 3 - A Little   5. To walk in hospital room? 3 - A Little   6. Climbing 3-5 steps with a railing? 3 - A Little   Basic Mobility Raw Score (Score out of 24.Lower scores equate to lower levels of function) 21   Total Evaluation Time   Total Evaluation Time (Minutes) 8     "

## 2020-10-24 NOTE — CONSULTS
HOSPITALIST CONSULTATION     REQUESTING PHYSICIAN: Mina Moore MD    REASON FOR CONSULTATION: Evaluation, Recommendations and Co-management of Medical Comorbidities.     ASSESSMENT & PLAN :     Nicci Pemberton is a 60 year old female admitted on 10/23/2020 s/p following procedure:    R TKA by Dr rojas.   For Chronic pain   No complication reported.         PMH, Pre Op eval reviewed.   Currently doing well. Some incisional pain R knee.         # Orthopedic Surgery:     Post Op Management per Primary team.     Hemodynamics: stable. Continue on gentle IV fluids, until adequate PO. Monitor I/o.   Post op capnography per protocol.   Pain control- per Primary team    Minimize use of narcotics as able. Consider bowel regimen with narcotics.   Encourage Incentive spirometry to prevent atelectasis.  DVT prophylaxis- per Primary team  Activity, antibiotics, wound and/or drain care - per Primary team.     Anemia of acute blood loss:  Monitor hgb for anemia of acute blood loss. Transfuse for hgb <7.0    History of liver transplant:  Doing well per patient.  Continue prior cyclosporine 75 mg twice daily  LFT in a.m.    Hypothyroidism: Continue prior levothyroxine 125 mcg daily    Gastroesophageal reflux disease:  Continue prior famotidine 20 mg twice daily as needed    CKD: Recent creatinine ranges between 1.1-1.2.  -Monitor BMP.  Avoid nephrotoxic's.    Rest per primary team.    Thank you for the consultation. Please page with any question. Hospitalist team to follow.      Sterling Meier MD   Hospitalist, Internal Medicine  Pager: [phone].     CHIEF COMPLAINT: Incisional pain, right knee    HISTORY OF PRESENT ILLNESS: Obtained from the patient and chart review including Pre op evaluation, procedure note.    60 year old year old female  with above discussed medical problems s/p right total knee arthroplasty,admitted on 10/23/2020  for post op care and monitoring  (for further details  for indication of surgery and operative note, please refer to Mina Moore MD note). Medicine consulted to evaluate, recommend and/or co manage medical co morbidities.   No documented hypotension, hypoxemia or other significant complications intra or post operative.   Currently: Incisional Pain +. Denies any chest pain, shortness of breath or LH or palpitations. Denies any nausea, vomiting or pain abdomen. No fever or chills.   Denies any new rash.   No other acute medical concern  Medical issues as discussed above.   Denies any other medical concern.     PAST MEDICAL HISTORY:   Past Medical History:   Diagnosis Date     Anemia      Cellulitis      Cirrhosis of liver (H) 6/18/2018     Heterozygous alpha 1-antitrypsin deficiency (H)      High hepatic iron concentration determined by biopsy of liver      Liver cirrhosis secondary to ANGULO (H)      Liver transplanted (H) 08/18/2019     Lymphedema      Osteoarthritis     knees     SBP (spontaneous bacterial peritonitis) (H) 8/2/2019 8/1/19 in [facility]       PAST SURGICAL HISTORY:   Past Surgical History:   Procedure Laterality Date     BENCH LIVER N/A 8/18/2019    Procedure: BACKBENCH PREPARATION, LIVER;  Surgeon: Mandeep Alford MD;  Location: [facility] OR     COLONOSCOPY N/A 6/22/2018    Procedure: COLONOSCOPY;  colonoscopy;  Surgeon: Hugo Patel MD;  [facility]     ENDOSCOPIC RETROGRADE CHOLANGIOPANCREATOGRAM N/A 2/10/2020    Procedure: ENDOSCOPIC RETROGRADE CHOLANGIOPANCREATOGRAPHY with spinchterotomy;  Surgeon: Guru Rigoberto Canada MD;  Location: [facility] OR     ENDOSCOPIC RETROGRADE CHOLANGIOPANCREATOGRAM N/A 5/13/2020    Procedure: ENDOSCOPIC RETROGRADE CHOLANGIOPANCREATOGRAPHY,Stent exchange and sludge removal;  Surgeon: Guru Rigoberto Canada MD;  Location: [facility] OR     ENDOSCOPIC RETROGRADE CHOLANGIOPANCREATOGRAPHY, EXCHANGE TUBE/STENT N/A 3/4/2020    Procedure: ENDOSCOPIC RETROGRADE CHOLANGIOPANCREATOGRAPHY, WITH biliary  dilarion, stent exchange, stone removal;  Surgeon: Guru Rigoberto Canada MD;  Location: [facility] OR     ESOPHAGOSCOPY, GASTROSCOPY, DUODENOSCOPY (EGD), COMBINED N/A 10/31/2019    Procedure: Esophagogastroduodenoscopy, With Biopsy;  Surgeon: Nerissa Aranda MD;  Location: [facility] GI     IR FEEDING TUBE PLACEMENT W MOHAN/MD  2019     IR FLUORO 0-1 HOUR  2019     IR LUMBAR PUNCTURE  2019     TRANSPLANT LIVER RECIPIENT  DONOR N/A 2019    Procedure: TRANSPLANT, LIVER, RECIPIENT,  DONOR;  Surgeon: Mandeep Alford MD;  Location: [facility] OR       FH: reviewed.     Family History   Problem Relation Age of Onset     Cirrhosis No family hx of      Liver Cancer No family hx of         SH: reviewed.     Social History     Socioeconomic History     Marital status:      Spouse name: None     Number of children: None     Years of education: None     Highest education level: None   Occupational History     None   Social Needs     Financial resource strain: None     Food insecurity     Worry: None     Inability: None     Transportation needs     Medical: None     Non-medical: None   Tobacco Use     Smoking status: Former Smoker     Packs/day: 0.00     Quit date:      Years since quittin.8     Smokeless tobacco: Never Used   Substance and Sexual Activity     Alcohol use: No     Drug use: No     Sexual activity: None   Lifestyle     Physical activity     Days per week: None     Minutes per session: None     Stress: None   Relationships     Social connections     Talks on phone: None     Gets together: None     Attends [employer] service: None     Active member of club or organization: None     Attends meetings of clubs or organizations: None     Relationship status: None     Intimate partner violence     Fear of current or ex partner: None     Emotionally abused: None     Physically abused: None     Forced sexual activity: None   Other Topics Concern     Parent/sibling  w/ CABG, MI or angioplasty before 65F 55M? Not Asked   Social History Narrative     None       ALLERGIES:   Allergies   Allergen Reactions     Ciprofloxacin Dizziness and Nausea     Food      Fruits with cores and pits  Apples     Sulfa Drugs Unknown     Swollen joints     Furosemide Rash         HOME MEDICATIONS:     Prior to Admission medications    Medication Sig Start Date End Date Taking? Authorizing Provider   acetaminophen (TYLENOL) 325 MG tablet Take 325-650 mg by mouth every 6 hours as needed for mild pain   Yes Reported, Patient   acetaminophen (TYLENOL) 325 MG tablet Take 2 tablets (650 mg) by mouth every 4 hours 10/23/20  Yes Shaun Cardenas MD   apixaban ANTICOAGULANT (ELIQUIS) 2.5 MG tablet Take 1 tablet (2.5 mg) by mouth 2 times daily 10/23/20  Yes Shaun Cardenas MD   Cholecalciferol (VITAMIN D-3) 25 MCG (1000 UT) CAPS Take 1,000 Units by mouth 2 times daily  Patient taking differently: Take by mouth daily Takes 2000 daily in AM 1/7/20  Yes Mandeep Alford MD   cycloSPORINE modified (GENERIC EQUIVALENT) 25 MG capsule Take 3 capsules (75 mg) by mouth 2 times daily 8/31/20  Yes Leventhal, Thomas Michael, MD   famotidine (PEPCID) 20 MG tablet Take 1 tablet (20 mg) by mouth 2 times daily 12/20/19  Yes Mandeep Alford MD   levothyroxine (SYNTHROID/LEVOTHROID) 125 MCG tablet Take 125 mcg by mouth daily  11/18/19  Yes Abstract, Provider   oxyCODONE (ROXICODONE) 5 MG tablet Take 1-2 tablets (5-10 mg) by mouth every 3 hours as needed for pain (Moderate to Severe) 10/23/20  Yes Shaun Cardenas MD   polyethylene glycol (MIRALAX) 17 g packet Take 17 g by mouth daily 10/23/20  Yes Shaun Cardenas MD   senna-docusate (SENOKOT-S/PERICOLACE) 8.6-50 MG tablet Take 1-2 tablets by mouth 2 times daily Take while on oral narcotics to prevent or treat constipation. 10/23/20  Yes Shaun Cardenas MD   amoxicillin (AMOXIL) 250 MG capsule Take 1 capsule (250 mg) by mouth every 8 hours 9/26/20    "Leventhal, Thomas Michael, MD   aspirin (ASA) 81 MG tablet Take 1 tablet (81 mg) by mouth daily 10/14/19   Mandeep Alford MD       CURRENT MEDICATIONS:    Current Facility-Administered Medications   Medication     [START ON 10/26/2020] acetaminophen (TYLENOL) tablet 650 mg     acetaminophen (TYLENOL) tablet 975 mg     benzocaine-menthol (CEPACOL) 15-3.6 MG lozenge 1 lozenge     bisacodyl (DULCOLAX) Suppository 10 mg     ceFAZolin (ANCEF) intermittent infusion 2 g in 100 mL dextrose PRE-MIX     docusate sodium (COLACE) capsule 100 mg     famotidine (PEPCID) tablet 20 mg    Or     famotidine (PEPCID) infusion 20 mg     gabapentin (NEURONTIN) capsule 100 mg     HYDROmorphone (DILAUDID) injection 0.2 mg    Or     HYDROmorphone (PF) (DILAUDID) injection 0.4 mg     lactated ringers infusion     lidocaine (LMX4) cream     lidocaine 1 % 0.1-1 mL     magnesium hydroxide (MILK OF MAGNESIA) suspension 30 mL     naloxone (NARCAN) injection 0.1-0.4 mg     ondansetron (ZOFRAN-ODT) ODT tab 4 mg    Or     ondansetron (ZOFRAN) injection 4 mg     oxyCODONE (ROXICODONE) tablet 5 mg    Or     oxyCODONE IR (ROXICODONE) tablet 10 mg     [START ON 10/24/2020] polyethylene glycol (MIRALAX) Packet 17 g     prochlorperazine (COMPAZINE) injection 10 mg    Or     prochlorperazine (COMPAZINE) tablet 10 mg     senna-docusate (SENOKOT-S/PERICOLACE) 8.6-50 MG per tablet 1 tablet     sodium chloride (PF) 0.9% PF flush 3 mL     sodium chloride (PF) 0.9% PF flush 3 mL     sodium phosphate (FLEET ENEMA) 1 enema         ROS: 10 point ROS neg other than the symptoms noted above in the HPI.    PHYSICAL EXAMINATION:     /70   Pulse 74   Temp 97.3  F (36.3  C) (Oral)   Resp 16   Ht 1.575 m (5' 2.01\")   Wt 93 kg (205 lb 0.4 oz)   SpO2 100%   BMI 37.49 kg/m    Temp (24hrs), Av.9  F (36.6  C), Min:97.3  F (36.3  C), Max:98.6  F (37  C)      BMI= Body mass index is 37.49 kg/m .      Intake/Output Summary (Last 24 hours) at 10/23/2020 " 2137  Last data filed at 10/23/2020 2121  Gross per 24 hour   Intake 2509 ml   Output 700 ml   Net 1809 ml       General: Alert, interactive, NAD.  Obese.  HEENT: AT/NC. Moist MM.    Neck: Supple.    Heart/CVS: Normal S1 and S2. Regular.   Chest/Respi: Non labored breathing. CTA BL.   Abdomen/GI: Soft, non distended, non tender. No g/r.  Extremities/MSK: Distal pulses 2+, well perfused. Rest per ortho.   Neuro: Alert and oriented x4.    Skin:  No new rash over exposed areas.       LABORATORY DATA: reviewed.     Recent Results (from the past 24 hour(s))   Creatinine    Collection Time: 10/23/20 11:10 AM   Result Value Ref Range    Creatinine 1.18 (H) 0.52 - 1.04 mg/dL    GFR Estimate 50 (L) >60 mL/min/[1.73_m2]    GFR Estimate If Black 58 (L) >60 mL/min/[1.73_m2]   Potassium    Collection Time: 10/23/20 11:10 AM   Result Value Ref Range    Potassium 4.9 3.4 - 5.3 mmol/L   ABO/Rh type and screen    Collection Time: 10/23/20 11:10 AM   Result Value Ref Range    ABO A     RH(D) Pos     Antibody Screen Neg     Test Valid Only At          [facility]    Specimen Expires 10/26/2020    Glucose    Collection Time: 10/23/20 11:10 AM   Result Value Ref Range    Glucose 92 70 - 99 mg/dL   CBC with platelets differential    Collection Time: 10/23/20 11:10 AM   Result Value Ref Range    WBC 6.0 4.0 - 11.0 10e9/L    RBC Count 3.87 3.8 - 5.2 10e12/L    Hemoglobin 12.8 11.7 - 15.7 g/dL    Hematocrit 38.1 35.0 - 47.0 %    MCV 98 78 - 100 fl    MCH 33.1 (H) 26.5 - 33.0 pg    MCHC 33.6 31.5 - 36.5 g/dL    RDW 11.9 10.0 - 15.0 %    Platelet Count 154 150 - 450 10e9/L    Diff Method Automated Method     % Neutrophils 61.8 %    % Lymphocytes 20.4 %    % Monocytes 10.1 %    % Eosinophils 6.9 %    % Basophils 0.3 %    % Immature Granulocytes 0.5 %    Nucleated RBCs 0 0 /100    Absolute Neutrophil 3.7 1.6 - 8.3 10e9/L    Absolute Lymphocytes 1.2 0.8 - 5.3 10e9/L    Absolute Monocytes 0.6 0.0 - 1.3  10e9/L    Absolute Eosinophils 0.4 0.0 - 0.7 10e9/L    Absolute Basophils 0.0 0.0 - 0.2 10e9/L    Abs Immature Granulocytes 0.0 0 - 0.4 10e9/L    Absolute Nucleated RBC 0.0    Basic metabolic panel    Collection Time: 10/23/20 11:10 AM   Result Value Ref Range    Sodium 137 133 - 144 mmol/L    Potassium 4.8 3.4 - 5.3 mmol/L    Chloride 108 94 - 109 mmol/L    Carbon Dioxide 27 20 - 32 mmol/L    Anion Gap 2 (L) 3 - 14 mmol/L    Glucose 87 70 - 99 mg/dL    Urea Nitrogen 29 7 - 30 mg/dL    Creatinine 1.22 (H) 0.52 - 1.04 mg/dL    GFR Estimate 48 (L) >60 mL/min/[1.73_m2]    GFR Estimate If Black 56 (L) >60 mL/min/[1.73_m2]    Calcium 9.3 8.5 - 10.1 mg/dL   INR    Collection Time: 10/23/20 11:10 AM   Result Value Ref Range    INR 0.97 0.86 - 1.14   Partial thromboplastin time    Collection Time: 10/23/20 11:10 AM   Result Value Ref Range    PTT 30 22 - 37 sec   Hepatic panel    Collection Time: 10/23/20 11:10 AM   Result Value Ref Range    Bilirubin Direct 0.3 (H) 0.0 - 0.2 mg/dL    Bilirubin Total 1.9 (H) 0.2 - 1.3 mg/dL    Albumin 3.8 3.4 - 5.0 g/dL    Protein Total 7.7 6.8 - 8.8 g/dL    Alkaline Phosphatase 139 40 - 150 U/L    ALT 27 0 - 50 U/L    AST 25 0 - 45 U/L       Recent Results (from the past 24 hour(s))   POC US Guidance Needle Placement    Impression    Right adductor canal nerve block   XR Knee Port Right 1/2 Views    Narrative    EXAM: XR KNEE PORT RT 1/2 VW  LOCATION: [facility]  DATE/TIME: 10/23/2020 6:03 PM    INDICATION: Postoperative follow-up.  COMPARISON: None.      Impression    IMPRESSION: Immediate changes of noncemented right total knee arthroplasty. The components are well seated without evidence of complication. No fracture. Moderate-sized joint effusion. Postoperative soft tissue gas.           Sterling Meier MD

## 2020-10-24 NOTE — PROGRESS NOTES
"SPIRITUAL HEALTH SERVICES  SPIRITUAL ASSESSMENT Progress Note  [facility] ([facility] [address]     REFERRAL SOURCE: request at admission for chaplaincy care    Visited pt Nicci and provided reflective conversation and prayer.    Despite trying to \"get pain under control\" today, Nicci expressed optimism over her full recovery. She has a supportive  and two grown sons who live near. [place] a source of [place] to her.    She shared her illness narrative over the last year or two - including a successful liver transplant and what it meant to her to be able to connect with her donor's family. She described that \"there are no words\" to communicate her gratitude.    Nicci also shared the grief of losing her father this year and not being able to be as present as she would have liked due to COVID and her own suppressed immune system.    Nicci's char[place], prayer, and being remembered in prayer by many prayer chains as been a source of support. She looks to the future with hope.    PLAN: [facility] remains available, as needed.    Shari Sumner  Chaplain Resident  Pager: [phone]   "

## 2020-10-24 NOTE — PROGRESS NOTES
"Orthopedic Surgery Progress Note    Subjective / Interval Events:   Patient resting in bed; no acute distress; pain controlled.  Denies chest pain, palpitations, SOB.  Pain well controlled on current regimen.      Patient resting comfortably upon entering the room.  Patient has been experiencing nausea since surgery.  She states this is common for her following the procedure.  Discussion was had regarding the possible discharge.  Given her history of liver transplant, nausea, and uncertain physical therapy performance, and she would like to stay until tomorrow.    Objective:   Vital Signs Temp (24hrs), Av.7  F (36.5  C), Min:97.3  F (36.3  C), Max:98  F (36.7  C)    /63 (BP Location: Left arm)   Pulse 68   Temp 97.5  F (36.4  C) (Oral)   Resp 16   Ht 1.575 m (5' 2.01\")   Wt 93 kg (205 lb 0.4 oz)   SpO2 98%   BMI 37.49 kg/m      Physical Examination   General: no acute distress  CV: palpable pulses  Resp: Nonlabored breathing  Right LE: dressing in tact; clean.  Some drainage at the inferior aspect of the incision.  Serosanguineous.     Right Lower Extremity Demonstrates:       5/5 Iliopsoas, quadricep, tibialis anterior, extensor houses longus, gastrocsoleus.     Sensation to light touch intact in the L2-S1 dermatome distribution.    Left dorsalis pedis pulse palpated to be 2/4.    Tolerates range of motion in all major joints     No significant pain or limitation of range of motion.     Labs (Last 24 hour):   Lab Results   Component Value Date    WBC 10.4 10/24/2020    HGB 8.9 10/24/2020    HCT 27.2 10/24/2020     10/24/2020     10/24/2020    RDW 11.9 10/24/2020     Lab Results   Component Value Date    BUN 25 10/24/2020     10/24/2020    CO2 22 10/24/2020    ANIONGAP 8 10/24/2020       Assessment / Plan:   This is a 60 year old female s/p right TKA on 10/23/2020 by Dr. Wallace    Plan: Ortho Primary  Monitoring: Per unit protocol  Activity: Up with assist.  Weight bearing status: " WBAT  Range of motion: As tolerated  Antibiotics: Ancef x24 hours perioperatively.  Diet: Begin with clear fluids and progress diet as tolerated.  DVT prophylaxis: Mechanical + ASA  Elevation: Elevate operative extremity to keep at level of heart as much as possible.  Wound Care: Dressing change tomorrow  Drain: None  Pain management: PO pain control  X-rays: PACU  Physical Therapy: ROM, ADL's.  Labs: Trend Hgb on POD #1.  Consults: PT + Med Mng  Follow-up: Clinic with Dr. Wallace in 2 weeks [Virtual Clinic]  Disposition: Likely discharge home tomorrow.    Please notify Dr. Shaun Cardenas of any new questions concerns or complications at [phone].  Thank you    Shaun Cardenas DO  Adult Joint Reconstruction Fellow  Dept Orthopaedic Surgery, [facility]  [phone] Pager [phone] Office

## 2020-10-24 NOTE — PHARMACY-ADMISSION MEDICATION HISTORY
Admission medication history interview status for the 10/23/2020 admission is complete. See Epic admission navigator for allergy information, pharmacy, prior to admission medications and immunization status.     Medication history interview sources:  patient was very well informed.     Changes made to PTA medication list (reason)  Added: none   Deleted: Amoxicillin   Changed: none     Additional medication history information (including reliability of information, actions taken by pharmacist):  Pt stopped Vit D prior to surgery. She will restart it at home.   Pt take CSA for hx of liver txp 14 months ago.       Prior to Admission medications    Medication Sig Last Dose Taking? Auth Provider   acetaminophen (TYLENOL) 325 MG tablet Take 325-650 mg by mouth every 6 hours as needed for mild pain Past Week at Unknown time Yes Reported, Patient   acetaminophen (TYLENOL) 325 MG tablet Take 2 tablets (650 mg) by mouth every 4 hours  Yes Shaun Cardenas MD   aspirin (ASA) 81 MG tablet Take 1 tablet (81 mg) by mouth daily Past Week at Unknown time Yes Mandeep Alford MD   Cholecalciferol (VITAMIN D-3) 25 MCG (1000 UT) CAPS Take 1,000 Units by mouth 2 times daily  Patient taking differently: Take by mouth daily Takes 2000 daily in AM Past Week at Unknown time Yes Mandeep Alford MD   cycloSPORINE modified (GENERIC EQUIVALENT) 25 MG capsule Take 3 capsules (75 mg) by mouth 2 times daily 10/23/2020 at 0930 Yes Leventhal, Thomas Michael, MD   famotidine (PEPCID) 20 MG tablet Take 1 tablet (20 mg) by mouth 2 times daily 10/23/2020 at 0930 Yes Mandeep Alford MD   levothyroxine (SYNTHROID/LEVOTHROID) 125 MCG tablet Take 125 mcg by mouth daily  10/23/2020 at 0930 Yes Abstract, Provider   oxyCODONE (ROXICODONE) 5 MG tablet Take 1-2 tablets (5-10 mg) by mouth every 3 hours as needed for pain (Moderate to Severe)  Yes Shaun Cardenas MD   polyethylene glycol (MIRALAX) 17 g packet Take 17 g by mouth daily  Yes  Shaun Cardenas MD   senna-docusate (SENOKOT-S/PERICOLACE) 8.6-50 MG tablet Take 1-2 tablets by mouth 2 times daily Take while on oral narcotics to prevent or treat constipation.  Yes Shaun Cardenas MD         Medication history completed by: Marichuy Costa [facility], PharmD

## 2020-10-24 NOTE — PROGRESS NOTES
[facility]      OUTPATIENT PHYSICAL THERAPY EVALUATION  PLAN OF TREATMENT FOR OUTPATIENT REHABILITATION  (COMPLETE FOR INITIAL CLAIMS ONLY)  Patient's Last Name, First Name, M.I.  YOB: 1960  Nicci Pemberton                        Provider's Name  [facility] Medical Record No.  [id number]                               Onset Date:  10/23/20   Start of Care Date:  10/24/20      Type:     _X_PT   ___OT   ___SLP Medical Diagnosis:  R TKA                        PT Diagnosis:  impaired functional mobility   Visits from SOC:  1   _________________________________________________________________________________  Plan of Treatment/Functional Goals    Planned Interventions: bed mobility training, balance training, gait training, home exercise program, patient/family education, ROM (range of motion), stair training, strengthening, stretching, transfer training     Goals: See Physical Therapy Goals on Care Plan in [facility] electronic health record.    Therapy Frequency: 2x/day  Predicted Duration of Therapy Intervention: 2 days  _________________________________________________________________________________    I CERTIFY THE NEED FOR THESE SERVICES FURNISHED UNDER        THIS PLAN OF TREATMENT AND WHILE UNDER MY CARE     (Physician co-signature of this document indicates review and certification of the therapy plan).                Certification date from: 10/24/20, Certification date to: 10/26/20    Referring Physician: Mario Wallace MD            Initial Assessment        See Physical Therapy evaluation dated 10/24/20 in Epic electronic health record.

## 2020-10-24 NOTE — PLAN OF CARE
Patient arrived to unit around 2000; VSS; LS clear and BS+; c/o pain to right thigh; denies need for pain med; repositioned in bed for comfort; at arrival, patient right leg with small amount of bloody drainage; reinforced with ABD; tolerated snacks and PO fluid; Dr. Meier called and requested home meds per patient's request; Cyclosporin ordered and administered to patient; voiding good amounts using bedpan; mo further bleeding; PIV patent, intact and IV fluid infusing; continue with patient care.

## 2020-10-25 ENCOUNTER — APPOINTMENT (OUTPATIENT)
Dept: PHYSICAL THERAPY | Facility: CLINIC | Age: 60
DRG: 470 | End: 2020-10-25
Attending: ORTHOPAEDIC SURGERY
Payer: COMMERCIAL

## 2020-10-25 LAB
GLUCOSE BLDC GLUCOMTR-MCNC: 113 MG/DL (ref 70–99)
HGB BLD-MCNC: 9.3 G/DL (ref 11.7–15.7)

## 2020-10-25 PROCEDURE — 120N000002 HC R&B MED SURG/OB UMMC

## 2020-10-25 PROCEDURE — 250N000012 HC RX MED GY IP 250 OP 636 PS 637: Performed by: INTERNAL MEDICINE

## 2020-10-25 PROCEDURE — 250N000013 HC RX MED GY IP 250 OP 250 PS 637: Performed by: INTERNAL MEDICINE

## 2020-10-25 PROCEDURE — 250N000013 HC RX MED GY IP 250 OP 250 PS 637: Performed by: STUDENT IN AN ORGANIZED HEALTH CARE EDUCATION/TRAINING PROGRAM

## 2020-10-25 PROCEDURE — 99232 SBSQ HOSP IP/OBS MODERATE 35: CPT | Performed by: INTERNAL MEDICINE

## 2020-10-25 PROCEDURE — 36415 COLL VENOUS BLD VENIPUNCTURE: CPT | Performed by: STUDENT IN AN ORGANIZED HEALTH CARE EDUCATION/TRAINING PROGRAM

## 2020-10-25 PROCEDURE — 97116 GAIT TRAINING THERAPY: CPT | Mod: GP | Performed by: CLINIC/CENTER

## 2020-10-25 PROCEDURE — 250N000011 HC RX IP 250 OP 636: Performed by: STUDENT IN AN ORGANIZED HEALTH CARE EDUCATION/TRAINING PROGRAM

## 2020-10-25 PROCEDURE — 99207 PR CDG-MDM COMPONENT: MEETS MODERATE - UP CODED: CPT | Performed by: INTERNAL MEDICINE

## 2020-10-25 PROCEDURE — 999N001017 HC STATISTIC GLUCOSE BY METER IP

## 2020-10-25 PROCEDURE — 97530 THERAPEUTIC ACTIVITIES: CPT | Mod: GP | Performed by: CLINIC/CENTER

## 2020-10-25 PROCEDURE — 85018 HEMOGLOBIN: CPT | Performed by: STUDENT IN AN ORGANIZED HEALTH CARE EDUCATION/TRAINING PROGRAM

## 2020-10-25 RX ORDER — ACETAMINOPHEN 500 MG
500 TABLET ORAL 4 TIMES DAILY
Status: DISCONTINUED | OUTPATIENT
Start: 2020-10-25 | End: 2020-10-26 | Stop reason: HOSPADM

## 2020-10-25 RX ADMIN — ACETAMINOPHEN 500 MG: 500 TABLET ORAL at 16:17

## 2020-10-25 RX ADMIN — OXYCODONE HYDROCHLORIDE 10 MG: 10 TABLET ORAL at 20:40

## 2020-10-25 RX ADMIN — OXYCODONE HYDROCHLORIDE 10 MG: 10 TABLET ORAL at 16:17

## 2020-10-25 RX ADMIN — DOCUSATE SODIUM 100 MG: 100 CAPSULE, LIQUID FILLED ORAL at 09:29

## 2020-10-25 RX ADMIN — SENNOSIDES AND DOCUSATE SODIUM 1 TABLET: 8.6; 5 TABLET ORAL at 09:21

## 2020-10-25 RX ADMIN — FAMOTIDINE 20 MG: 20 TABLET ORAL at 09:21

## 2020-10-25 RX ADMIN — GABAPENTIN 100 MG: 100 CAPSULE ORAL at 02:30

## 2020-10-25 RX ADMIN — OXYCODONE HYDROCHLORIDE 10 MG: 10 TABLET ORAL at 03:37

## 2020-10-25 RX ADMIN — OXYCODONE HYDROCHLORIDE 10 MG: 10 TABLET ORAL at 11:02

## 2020-10-25 RX ADMIN — LEVOTHYROXINE SODIUM 125 MCG: 25 TABLET ORAL at 09:21

## 2020-10-25 RX ADMIN — HYDROMORPHONE HYDROCHLORIDE 0.2 MG: 0.2 INJECTION, SOLUTION INTRAMUSCULAR; INTRAVENOUS; SUBCUTANEOUS at 02:47

## 2020-10-25 RX ADMIN — ASPIRIN 81 MG CHEWABLE TABLET 162 MG: 81 TABLET CHEWABLE at 19:35

## 2020-10-25 RX ADMIN — ASPIRIN 81 MG CHEWABLE TABLET 162 MG: 81 TABLET CHEWABLE at 09:20

## 2020-10-25 RX ADMIN — SENNOSIDES AND DOCUSATE SODIUM 1 TABLET: 8.6; 5 TABLET ORAL at 19:35

## 2020-10-25 RX ADMIN — FAMOTIDINE 20 MG: 20 TABLET ORAL at 19:35

## 2020-10-25 RX ADMIN — DOCUSATE SODIUM 100 MG: 100 CAPSULE, LIQUID FILLED ORAL at 19:35

## 2020-10-25 RX ADMIN — ACETAMINOPHEN 325 MG: 325 TABLET, FILM COATED ORAL at 02:30

## 2020-10-25 RX ADMIN — POLYETHYLENE GLYCOL 3350 17 G: 17 POWDER, FOR SOLUTION ORAL at 09:22

## 2020-10-25 RX ADMIN — CYCLOSPORINE 75 MG: 25 CAPSULE, LIQUID FILLED ORAL at 09:20

## 2020-10-25 RX ADMIN — ACETAMINOPHEN 325 MG: 325 TABLET, FILM COATED ORAL at 11:14

## 2020-10-25 RX ADMIN — HYDROMORPHONE HYDROCHLORIDE 0.2 MG: 0.2 INJECTION, SOLUTION INTRAMUSCULAR; INTRAVENOUS; SUBCUTANEOUS at 07:02

## 2020-10-25 RX ADMIN — ACETAMINOPHEN 500 MG: 500 TABLET ORAL at 20:40

## 2020-10-25 RX ADMIN — CYCLOSPORINE 75 MG: 25 CAPSULE, LIQUID FILLED ORAL at 20:40

## 2020-10-25 RX ADMIN — MAGNESIUM HYDROXIDE 30 ML: 400 SUSPENSION ORAL at 16:16

## 2020-10-25 NOTE — PLAN OF CARE
Physical Therapy Discharge Summary    Reason for therapy discharge:    All goals and outcomes met, no further needs identified.    Progress towards therapy goal(s). See goals on Care Plan in [facility] electronic health record for goal details.  Goals met    Therapy recommendation(s):    Continue home exercise program. OP PT for progression of LE strengthening and ROM.

## 2020-10-25 NOTE — PROGRESS NOTES
Patient A&O x4. Denied CP, lightheadedness, dizziness, numbness, tingling.  Denied SOB/CAMARA. Pain is controlled with Oxycodone and tylenol. Tylenol order was reduced to 2000mg/day d/t pt's liver transplant. SBAx1 with walker to the BR and hallway. Passing gas but no BM as of yet even with laxatives.  Pt will not discharge today and will continue with POC.

## 2020-10-25 NOTE — UTILIZATION REVIEW
[facility] Utilization Review  Admission Status; Secondary Review Determination     Admission Date: 10/23/2020  9:42 AM      Under the authority of the Utilization Management Committee, the utilization review process indicated a secondary review on the above patient.  The review outcome is based on review of the medical records, discussions with staff, and applying clinical experience noted on the date of the review.        (X)      Inpatient Status Appropriate - This patient's medical care is consistent with medical management for inpatient care and reasonable inpatient medical practice.          RATIONALE FOR DETERMINATION   60-year-old female with history of arthritis, status post right TKA on 10/23, also has history of liver transplantation currently on immunosuppressants.  She remains high risk for adverse outcome patient has issues with postoperative pain control, unable to ambulate with and limited due to poor pain control, more than 2 midnights therefore it is reasonable to advance to inpatient status.  The recommendation to the primary team.  Patient is admitted as outpatient but with ongoing pain control issues, and requiring more than 2 midnight hospital stay, recommend change to inpatient status, communicated to Dr. Cardenas      The severity of illness, intensity of service provided, expected LOS and risk for adverse outcome make the care complex, high risk and appropriate for hospital admission.The patient requires hospital based medical care which is anticipated to require a stay of 2 or more midnights.        The information on this document is developed by the utilization review team in order for the business office to ensure compliance.  This only denotes the appropriateness of proper admission status and does not reflect the quality of care rendered.              Sincerely,       Ana Wayne MD  Physician Advisor  Utilization Review-[place]    Phone: [phone]

## 2020-10-25 NOTE — PROGRESS NOTES
"Seen and examined patient  Rough night as the pain is coming back   Eating and drinking well        /68 (BP Location: Left arm)   Pulse 95   Temp 97.5  F (36.4  C) (Oral)   Resp 16   Ht 1.575 m (5' 2.01\")   Wt 93 kg (205 lb 0.4 oz)   SpO2 95%   BMI 37.49 kg/m    CVS: Normal Heart sounds   RS: Clear breath sounds bilaterally   Abdomen: Soft and non tender. Normal bowel sounds.  Neuro: Alert and orientated X 3   MSK: Rt knee with intact dressings. No distal deficits     Labs reviewed. Post op hgb at 9.3    Nicci Pemberton is a 60 year old female admitted on 10/23/2020 s/p Rt TKA. She has a know h/o S/P Liver transplant for ANGULO. Internal Medicine following for postoperative co management      #S/P Rt Knee arthroplasty, POD#2:     Post Op Management per Primary team.   Post op capnography per protocol.   Pain control- per Primary team.   Minimize use of narcotics as able.   Encourage Incentive spirometry to prevent atelectasis.  DVT prophylaxis- per Primary team - I discussed this with a team, given that patient has borderline low platelets, and she has tolerated aspirin in the past, consider aspirin 162 mg   Activity, antibiotics, wound and/or drain care - per Primary team.    Anemia of acute blood loss:  Monitor hgb for anemia of acute blood loss. Transfuse for hgb <7.0  Post op hgb at 9.3    History of liver transplant:  Continue prior cyclosporine 75 mg twice daily  LFT  Postoperatively within acceptable limits      Hypothyroidism:   Continue prior levothyroxine 125 mcg daily     Gastroesophageal reflux disease:  Continue prior famotidine 20 mg twice daily as needed     CKD:   Recent creatinine ranges between 1.1-1.2.  Stable BMP       Dr JIMENEZ Moya MD, FACP  Hospitalist ( Internal medicine)  Pager: [phone]      "

## 2020-10-25 NOTE — PLAN OF CARE
VS: VSS   O2: Room air, maintaining sats >90 this shift   Output: Voiding adequate amounts, up to bathroom    Last BM: 10/22/2020, passing gas   Activity: WBAT RLE, SBA x1, walker, gait belt   Skin: incision   Pain: Managed with scheduled tylenol (325mg per pt. Request due to 2000mg tylenol limit for transplant), prn po oxycodone, and IV dilaudid 0.2mg x2 for breakthrough pain and ice   CMS: Denies numbness/tingling   Dressing: ACE CDI, checked skin around wrap  Ortho to remove dressing this AM per report   Diet: Regular diet as tolerated by pt. Denies nausea.   LDA: PIV SL   Equipment: Walker, pillows   Plan: Possible discharge to home today 10/25/2020   Additional Info: Pt. Is A&Ox4

## 2020-10-25 NOTE — PLAN OF CARE
VS: VSS. A+Ox4.    O2: >90% on RA. Denies SOB and chest pain. Clear lung sounds bilaterally.    Output: Voids spontaneously without difficulty.    Last BM: 10/22/20. Passing flatus. Prune juice given and fluids encouraged.    Activity: SBA/Ax1 with gait belt and walker. WBAT RLE. Able to lift legs in an out of bed independently. Ambulated total of 190 feet.     Up for meals? Declined   Skin: Intact except right knee incision. Mild edema BLEs. Hx of lymphedema. Scarring on abdomen from liver transplant   Pain: Moderate pain above incision site near right medial thigh. 10mg oxycodone and ice packs used with partial relief.    CMS: Intact. Denies numbness and tingling in all extremities.    Dressing: Moderate dried drainage on RLE. Ace wrap removed. Dressing to be changed tomorrow AM by ortho team.    Diet: Regular   LDA: Right hand PIV, patent and SL. Left PIV removed.    Equipment: Walker,gait belt, personal belongings.   Plan: Likely discharge home tomorrow. Lives with .    Additional Info:        Patient vital signs are at baseline: Yes  Patient able to ambulate as they were prior to admission or with assist devices provided by therapies during their stay:  Yes  Patient MUST void prior to discharge:  Yes  Patient able to tolerate oral intake:  Yes  Pain has adequate pain control using Oral analgesics:  No,  Reason:  Patient reports significant pain while ambulating with prn oxycodone.

## 2020-10-25 NOTE — PROGRESS NOTES
"Orthopedic Surgery Progress Note    Subjective / Interval Events:   Patient resting in bed; no acute distress; pain controlled.  Denies chest pain, palpitations, SOB.  Pain well controlled on current regimen.      Patient describes persistent and significant distal thigh pain in the area of the tourniquet as well as posterior knee pain.  She feels that this pain would limit her ability to meet her activities of daily living physical requirements at home at this time.  For this reason and given the patient's complex medical history, it seems appropriate for her to stay 1 more day until tomorrow.  She does report that her nausea is improved from yesterday.    Objective:   Vital Signs Temp (24hrs), Av.7  F (36.5  C), Min:97.5  F (36.4  C), Max:98.2  F (36.8  C)    /68 (BP Location: Left arm)   Pulse 95   Temp 97.5  F (36.4  C) (Oral)   Resp 16   Ht 1.575 m (5' 2.01\")   Wt 93 kg (205 lb 0.4 oz)   SpO2 95%   BMI 37.49 kg/m        Physical Examination   General: no acute distress  CV: palpable pulses  Resp: Nonlabored breathing  Right LE: dressing in tact; clean and dry      5/5 Iliopsoas, quadricep, tibialis anterior, extensor houses longus, gastrocsoleus.     Sensation to light touch intact in the L2-S1 dermatome distribution.    Left dorsalis pedis pulse palpated to be 2/4.    Tolerates range of motion in all major joints     No significant pain or limitation of range of motion.     Labs (Last 24 hour):   Lab Results   Component Value Date    WBC 10.4 10/24/2020    HGB 9.3 10/25/2020    HCT 27.2 10/24/2020     10/24/2020     10/24/2020    RDW 11.9 10/24/2020     Lab Results   Component Value Date    BUN 25 10/24/2020     10/24/2020    CO2 22 10/24/2020    ANIONGAP 8 10/24/2020       Assessment / Plan:   This is a 60 year old female s/p right TKA on 10/23/2020 by Dr. Wallace.  Patient is also status post liver transplantation currently on cyclosporine immunosuppressants which were " continued and throughout the perioperative period.     Plan: Ortho Primary  Monitoring: Per unit protocol  Activity: Up with assist.  Weight bearing status: WBAT  Range of motion: As tolerated  Antibiotics: Ancef x24 hours perioperatively.  Diet: Begin with clear fluids and progress diet as tolerated.  DVT prophylaxis: Mechanical + ASA   Elevation: Elevate operative extremity to keep at level of heart as much as possible.  Wound Care: Dressing change tomorrow  Drain: None  Pain management: PO pain control   X-rays: PACU  Physical Therapy: ROM, ADL's.  Labs: Trend Hgb on POD #1.  Consults: PT + Med Mng  Follow-up: Clinic with Dr. Wallace in 2 weeks [Virtual Clinic]  Disposition: Likely discharge home tomorrow.    Given the fact that the patient is still having significant pain, and she feels it would be difficult for her to meet all of the functional requirements of her activities of daily living at home independently at this time, we will keep the patient until tomorrow.  Following discussion with our medical colleagues this plan is in agreement.     Please notify Dr. Shaun Cardenas of any new questions concerns or complications at [phone].  Thank you    Shaun Cardenas DO  Adult Joint Reconstruction Fellow  Dept Orthopaedic Surgery, [facility]  [phone] Pager [phone] Office

## 2020-10-25 NOTE — PROGRESS NOTES
"VS: /68 (BP Location: Left arm)   Pulse 95   Temp 97.5  F (36.4  C) (Oral)   Resp 16   Ht 1.575 m (5' 2.01\")   Wt 93 kg (205 lb 0.4 oz)   SpO2 95%   BMI 37.49 kg/m      O2: SpO2 95% Room air   Output: Voiding easily & regularly. Ambulating w/walker & SBA.   Last BM: 10/22/2020; passing gas   Activity: WBAT RLE, SBA x1, walker. Up at will. Able to raise RLE to bed from sitting position.    Skin: Incision. Moderate dried, dark red drainage visible on RLE prior to dressing change. Some swelling. Otherwise warm, dry, intact.   Pain: Managed with oxycodone 10 mg PRN every 4 hrs + Tylenol 325 mg (per Pt. request NTE 2000 mg Tylenol d/t liver transplant), ice packs provided. Pain superior to incision site, from medial to lateral upper right thigh.   CMS: A&O X4, Denies numbness/tingling.    Dressing: A.M. dressing change by Ortho.   Diet: Regular. Denies nausea.   LDA: Right hand PIV SL   Equipment: Walker, pillows, personal belongings.   Plan: Continue POC. Staying overnight to continue pain mgmt.   Additional Info:        "

## 2020-10-26 ENCOUNTER — PATIENT OUTREACH (OUTPATIENT)
Dept: CARE COORDINATION | Facility: CLINIC | Age: 60
End: 2020-10-26

## 2020-10-26 VITALS
RESPIRATION RATE: 16 BRPM | OXYGEN SATURATION: 96 % | WEIGHT: 205.03 LBS | HEIGHT: 62 IN | HEART RATE: 89 BPM | DIASTOLIC BLOOD PRESSURE: 56 MMHG | TEMPERATURE: 98.5 F | BODY MASS INDEX: 37.73 KG/M2 | SYSTOLIC BLOOD PRESSURE: 109 MMHG

## 2020-10-26 PROCEDURE — 250N000013 HC RX MED GY IP 250 OP 250 PS 637: Performed by: INTERNAL MEDICINE

## 2020-10-26 PROCEDURE — 99232 SBSQ HOSP IP/OBS MODERATE 35: CPT | Performed by: INTERNAL MEDICINE

## 2020-10-26 PROCEDURE — 99207 PR CDG-MDM COMPONENT: MEETS MODERATE - UP CODED: CPT | Performed by: INTERNAL MEDICINE

## 2020-10-26 PROCEDURE — 250N000012 HC RX MED GY IP 250 OP 636 PS 637: Performed by: INTERNAL MEDICINE

## 2020-10-26 PROCEDURE — 250N000013 HC RX MED GY IP 250 OP 250 PS 637: Performed by: STUDENT IN AN ORGANIZED HEALTH CARE EDUCATION/TRAINING PROGRAM

## 2020-10-26 RX ADMIN — ACETAMINOPHEN 500 MG: 500 TABLET ORAL at 08:00

## 2020-10-26 RX ADMIN — CYCLOSPORINE 75 MG: 25 CAPSULE, LIQUID FILLED ORAL at 09:15

## 2020-10-26 RX ADMIN — POLYETHYLENE GLYCOL 3350 17 G: 17 POWDER, FOR SOLUTION ORAL at 07:57

## 2020-10-26 RX ADMIN — SENNOSIDES AND DOCUSATE SODIUM 1 TABLET: 8.6; 5 TABLET ORAL at 08:01

## 2020-10-26 RX ADMIN — ACETAMINOPHEN 500 MG: 500 TABLET ORAL at 13:01

## 2020-10-26 RX ADMIN — OXYCODONE HYDROCHLORIDE 10 MG: 10 TABLET ORAL at 09:15

## 2020-10-26 RX ADMIN — BISACODYL 10 MG: 10 SUPPOSITORY RECTAL at 08:14

## 2020-10-26 RX ADMIN — LEVOTHYROXINE SODIUM 125 MCG: 25 TABLET ORAL at 07:58

## 2020-10-26 RX ADMIN — ASPIRIN 81 MG CHEWABLE TABLET 162 MG: 81 TABLET CHEWABLE at 07:58

## 2020-10-26 RX ADMIN — OXYCODONE HYDROCHLORIDE 10 MG: 10 TABLET ORAL at 00:40

## 2020-10-26 RX ADMIN — OXYCODONE HYDROCHLORIDE 10 MG: 10 TABLET ORAL at 04:57

## 2020-10-26 RX ADMIN — DOCUSATE SODIUM 100 MG: 100 CAPSULE, LIQUID FILLED ORAL at 07:58

## 2020-10-26 RX ADMIN — FAMOTIDINE 20 MG: 20 TABLET ORAL at 07:59

## 2020-10-26 NOTE — CONSULTS
Care Management Assessment and Discharge Consult    General Information   ,                   Advance Care Planning:            Communication Assessment  Patient's communication style: spoken language (English or Bilingual)  Hearing Difficulty or Deaf: no Wear Glasses or Blind: no    Cognitive  Cognitive/Neuro/Behavioral: WDL  Level of Consciousness: alert  Arousal Level: opens eyes spontaneously  Orientation: oriented x 4  Mood/Behavior: calm, cooperative  Best Language: 0 - No aphasia  Speech: clear, spontaneous, logical    Living Environment:   People in home: spouse     Current living Arrangements: house          Family/Social Support:  Care provided by:    Provides care for:             Description of Support System:                        Current Resources:   Skilled Home Care Services:    Community Resources:    Equipment currently used at home: cane, straight, walker, standard, raised toilet seat, shower chair, grab bar, toilet, grab bar, tub/shower  Supplies currently used at home:      Employment:  Employment Status:          Financial/Environmental Concerns:            Lifestyle & Psychosocial Needs:        Socioeconomic History     Marital status:      Spouse name: Not on file     Number of children: Not on file     Years of education: Not on file     Highest education level: Not on file     Tobacco Use     Smoking status: Former Smoker     Packs/day: 0.00     Quit date:      Years since quittin.8     Smokeless tobacco: Never Used   Substance and Sexual Activity     Alcohol use: No     Drug use: No       Functional Status:  Prior to admission patient needed assistance:          Mental Health Status:  WDL                            Values/Beliefs:  Spiritual, Cultural Beliefs, Yazidi Practices, Values that affect care: no                 Discharge Planning:  Expected Discharge Date: 10/27/20     Concerns to be Addressed: all concerns addressed in this encounter       Anticipated  Discharge Disposition: Outpatient Rehab (PT, OT, SLP, Cardiac or Pulmonary)  Anticipated Discharge Services:  [facility]  Anticipated Discharge DME:      Patient/family educated on Medicare website which has current facility and service quality ratings:    Referrals Placed by CM/SW:  [facility] Referral  Patient/Family in Agreement with the Plan: yes     Disposition Comments:      Selected Continued Care - Admitted Since 10/23/2020    No services have been selected for the patient.         Additional Information:  Patient plan to discharge to home with outpatient physical therapy. A referral was sent to Centralized Scheduling for outpatient physical therapy. RNCC available as needed.      Juliet Cartwright RN, BSN  Care Coordinator, [facility]  Phone [phone]  Pager [phone]

## 2020-10-26 NOTE — PLAN OF CARE
VS: VSS   O2: Room air, maintaining sats >90 this shift   Output: Voiding adequate amounts, up to bathroom this shift   Last BM: 10/22/2020 (before surgery) Passing gas, bowel sounds active   Activity: SBA, walker   Skin: Incision and swelling RLE   Pain: Managed with prn po oxycodone 10mg q.4 and ice   CMS: Denies numbness/tingling   Dressing: Dressing has small amount of dried drainage, otherwise CDI   Diet: Regular diet as tolerated by pt. Denies nausea.    LDA: None   Equipment: Walker, pillows   Plan: Discharge to home today 10/26/2020   Additional Info: Pt. Is A&Ox4

## 2020-10-26 NOTE — PLAN OF CARE
VS: VSS, pt denies CP or SOB.   O2: Room air sat. > 90%.   Output: Voiding adequate amount in the bathroom.    Last BM: 10/26/20 after dulcolax suppository.    Activity: Up to bathroom with walker and SBA.    Skin: Intact except surgical incision.    Pain: Comfortably manageable with PRN medication.    CMS: CMS and neuro intact.    Dressing: CDI.    Diet: Regular tolerating without N/V.    LDA: None.    Equipment: Walker, PCD and personal belongings.    Plan: TBD.    Additional Info:

## 2020-10-26 NOTE — OP NOTE
OPERATION REPORT     DATE OF OPERATION: 10/26/2020     Patient: Nicci Pemberton  MRN: [id number]  : 1960    SURGEON: Mario Wallace MD.     ASSISTANT: Shaun Cardenas DO [Adult Reconstruction Fellow]    ANESTHESIOLOGIST: Anesthesiologist: Brayan Johnston DO  CRNA: Criss Kaplan, SANTINO CRNA; Michelle Browne APRN CRNA    ANESTHESIA: General    PREOPERATIVE DIAGNOSIS: Right Knee, End Stage Osteoarthritis    POSTOPERATIVE DIAGNOSIS: Same as pre-operative diagnosis    OPERATION(S) PERFORMED: Right total knee arthroplasty    ESTIMATED BLOOD LOSS: See anesthesia record mL.     BACKGROUND:  60 year old female with a past medical history that includes liver transplantation was referred to us because of bilateral knee pain.  This pain is been present for 3 years and has been progressive over time.  She is experiencing night pain and initiation pain.  She currently ambulates 95% of the time with a cane.  She walks in and around the house.  For pain medication she is tried Tylenol and NSAIDs with insufficient pain relief.  She has had 3 corticosteroids and 2 Synvisc injections in the past.  Patient has not tried any bracing because of her peripheral lymphedema- she was not able to fit that appropriately.  Patient is currently on cyclosporine immunosuppressants [to be continued throughout the jessi-op period].    INDICATIONS: End-stage osteoarthritis, right knee, bone on bone, grade IV changes. Failed non-operative treatment regarding right knee pain.    OPERATIVE FINDINGS: Tri-compartmental osteoarthritis, bone on bone, polished eburnated surfaces in 2/3 (medial and PF) compartments.     OPERATIVE PROCEDURE:   The patient was seen in the pre-operative area; informed consent was obtained.  The right knee was appropriately marked by the patient and the operating surgeon.  The patient was brought to the operating room and transferred to the OR bed.  Spinal with MAC was induced. The patient was positioned  supine with appropriate padding and a safety strap.  A proximal thigh tourniquet was placed but not inflated at this time.  The patient's right lower extremity was prepped and draped in the standard fashion.  At this time a surgical safety timeout was performed involving the operating room nurse, anesthesia team, scrub tech, and operating surgeon. The right lower extremity was exsanguinated and the tourniquet inflated.  Using a 10 blade scalpel, an anterior right knee incision was made.  Sharp dissection was carried down through the subcutaneous tissues and the quadriceps tendon, vastus medialis insertion, patella, and patellar tendon were visualized.  Switching to a deep knife, a medial parapatellar arthrotomy was created.  A medial flap was created along the medial proximal tibia extending around the posterior-medial corner; the ACL was removed and the anterior horn of the lateral meniscus was removed.  The patella was subluxed and the knee was flexed to expose the distal femur.  The opening drill was used to enter the distal femoral intramedullary canal and the intramedullary femur guide was inserted.  The distal femur cutting block was pinned in place at 5 degrees of valgus and for a 10mm distal femur resection.  The distal femur was then cut. The extra-medullary guide was then placed on the tibia; it was aligned in neutral varus/valgus, posterior slope matching the native proximal tibia, and for approximately 10mm resection off of the right tibial plateau; pre-op x-rays revealed a varus knee.  The tibial cutting guide was pinned in place.  Retractors were placed for exposure and to protect the patellar tendon and MCL.  The proximal tibial cut was made. Visible portions of the menisci were removed.  The knee was placed in extension and the Tensometer was inserted; it was opened to 40 and the size of the gap was noted. It was noted that the knee was still 2mm tighter on the medial side. A further medial release  of the soft tissues was performed. The tensiometer was then replaced in extension which demonstrated improved gap balancing. The knee was then moved into flexion and the Tensiometer was again opened to 40.  Pins were drilled in the gig and the size of the femur was measured.  The four-in-one femoral cutting guide was impacted onto the distal femur; cuts were made beginning with the anterior, then the posterior and lastly the two champfer cuts.  Osteophytes were removed.  The trial femur was impacted into place.  A tibia tray and 10mm poly trial were placed.  The knee was taken through a ROM; it achieved full flexion and extension. There was noted to be 3-4 mm gapping with varus and valgus stress testing. For this reason the 12mm insert was placed. Full flexion and extension was then noted. 1-2 mm gapping with varus and valgus stress testing was observed medially and laterally. Rotation of the tibial component was marked. The femoral trial was removed. The trial tibial baseplate was pinned in its correct position and the tibia was prepared with a keel.  The patella thickness was then measured and cut with a cutting gig; and the lug holes were drilled.  All trial components were removed; excess osteophytes and soft tissue were excised. A small drill was used to make holes in the proximal tibia and patella to improve cement interdigitation.  The bone ends were then irrigated with a power  and dried well. Cement was mixed in standard fashion. The tibial component was cemented in place, excess cement was removed. Then the femur was placed and cemented. A trial poly liner was inserted. The patella was also cemented in place. All excess cement was removed and the knee was maintained in full extension until the cement had fully hardened. The knee had full ROM.  The trial poly was removed. The final poly component was then inserted and clamped in place with the locking bar. Knee ROM remained unchanged and was  stable. A portion of the local anesthetic was injected into the posterior knee capsule. The knee was then copiously irrigated.  The remaining local anesthetic was injected into the periarticular tissue.  Wound closure was done in layers beginning with 0-vicryl sutures in figure of eight.  Closure continued with 2-0 vicryl buried interrupted sutures for the subcutaneous tissue and a running subcuticular 3-0 PDS. Steri-strips and a sterile dressing were applied. The patient was transferred to the hospital bed. The patient had a palpable dorsalis pedis pulse prior to leaving the operating room.    POSTOPERATIVE PLAN:     Plan: Ortho Primary  Monitoring: Per unit protocol  Activity: Up with assist.  Weight bearing status: WBAT  Range of motion: As tolerated.  Antibiotics: Ancef x24 hours perioperatively.  Diet: Begin with clear fluids and progress diet as tolerated.  DVT prophylaxis: Mechanical + ASA  Bracing/Splinting: None  Elevation: Elevate operative extremity to keep at level of heart as much as possible.  Wound Care: Keep dressings in place until POD2  Drain: None  Pain management: PO pain control  X-rays: PACU, See Below  Physical Therapy: ROM, ADL's.  Labs: Trend Hgb on POD #1.  Consults: PT + Med Mng  Follow-up: Clinic with Dr. Wallace in 2 weeks [Virtual Clinic]  Disposition: Pending progress with therapies, pain control on orals, and medical stability, anticipate discharge to home on POD #1-2      FINAL IMPLANTS: [employer] / [employer] / [employer]      71mm Tibia    67.5 CR Femur    31x8 Patella    12x71 Insert AS    Implant Name Type Inv. Item Serial No.  Lot No. LRB No. Used Action   BONE CEMENT SIMPLEX FULL DOSE 6191-1-001 Cement, Bone BONE CEMENT SIMPLEX FULL DOSE 6191-1-001  [employer] [id number] Right 2 Implanted   IMP COMP TIBIAL KNEE ASCENT 71MM [id number] Total Joint Component/Insert IMP COMP TIBIAL KNEE ASCENT 71MM [id number]  [employer] [id number] Right 1 Implanted   IMP COMP FEMORAL VANGARD BIOM  RT 67.5MM [id number] Total Joint Component/Insert IMP COMP FEMORAL VANGARD BIOM RT 67.5MM [id number]  [employer] [id number] Right 1 Implanted   IMP COMP PATELLA BIOM ARCM SM 31MM [id number] Total Joint Component/Insert IMP COMP PATELLA BIOM ARCM SM 31MM [id number]  [employer] [id number] Right 1 Implanted   IMP TIBIAL BEARING ANTERIOR VANGARD BIOM [id number] Total Joint Component/Insert IMP TIBIAL BEARING ANTERIOR VANGARD BIOM [id number]  [employer] [id number] Right 1 Implanted       ATTESTATION: Dr. Wallace was present at all critical portions of this case and immediately available at all times during the duration of the case.               Shaun Cardenas DO  Adult Joint Reconstruction Fellow  Dept Orthopaedic Surgery, [facility]  [phone] Pager [phone] Office      Attending MD (Dr. Mario Wallace) Attestation:  I was present during the key portions of the procedure and I was immediately available for the entire procedure between opening and closing.    MD Mike Del Rio Family Professor  Oncology and Adult Reconstructive Surgery  Dept Orthopaedic Surgery, [facility]

## 2020-10-26 NOTE — PROGRESS NOTES
Orthopaedic Surgery Progress Note 10/26/2020    IE: No acute events overnight. AFVSS. Stayed overnight for further optimization of pain management.     S: Pain controlled. Denies numbness or tingling. Denies chest pain, SOB, nausea/vomiting. Tolerating diet. +flatus. Voiding spontaneously.  Up with nursing. Up with PT who anticipates eventual home discharge. Care plan reviewed and questions answered.     O:  Temp: 98.5  F (36.9  C) Temp src: Oral BP: 104/58 Pulse: 93   Resp: 16 SpO2: 98 % O2 Device: None (Room air)      Exam:  Gen: No acute distress, resting comfortably in bed.  Resp: Non-labored breathing  MSK:  RLE:  - Dressings c/d/i  - SILT femoral/tibial/sural/saphenous/DP/SP nerves  - Fires TA, EHL, FHL, GaSC  - PT/DP pulses 2+, foot wwp    Recent Labs   Lab 10/25/20  0558 10/24/20  0659 10/23/20  1110   WBC  --  10.4 6.0   HGB 9.3* 8.9* 12.8   PLT  --  114* 154     Assessment: Nicci Pemberton is a 60 year old female s/p right TKA on 10/23/2020 by Dr. Wallace.  Patient is also status post liver transplantation currently on cyclosporine immunosuppressants which were continued and throughout the perioperative period.     Plan: Ortho Primary  Monitoring: Per unit protocol  Activity: Up with assist.  Weight bearing status: WBAT  Range of motion: As tolerated  Antibiotics: Ancef x24 hours perioperatively.  Diet: Begin with clear fluids and progress diet as tolerated.  DVT prophylaxis: Mechanical + ASA   Elevation: Elevate operative extremity to keep at level of heart as much as possible.  Wound Care: Dressing change tomorrow  Drain: None  Pain management: PO pain control   X-rays: PACU  Physical Therapy: ROM, ADL's.  Labs: Trend Hgb on POD #1.  Consults: PT + Med Mng  Follow-up: Clinic with Dr. Wallace in 2 weeks [Virtual Clinic]  Disposition: Likely discharge home tomorrow.    Patient discussed with Dr. Wallace.    Delfina Menon MD 10/26/2020  Orthopedic Surgery, PGY4  Pager: [phone]      Please page me  directly with any questions/concerns prior to paging the orthopaedic surgery resident on call on M-F between 7AM-4PM. Thanks!

## 2020-10-26 NOTE — PLAN OF CARE
Pt discharged home with family, discharge medication and discharge instruction given, all belongings sent home with pt and pt understand discharge plan.

## 2020-10-26 NOTE — DISCHARGE SUMMARY
ORTHOPAEDIC SURGERY DISCHARGE SUMMARY     Date of Admission: 10/23/2020  Date of Discharge: 10/26/2020  2:46 PM  Disposition: Home  Staff Physician: Mario Wallace MD  Primary Care Provider: Wale Thurston    DISCHARGE DIAGNOSIS:  Chronic pain of both knees [M25.561, M25.562, G89.29]    PROCEDURES: Procedure(s):  Right  total knee arthroplasty on 10/23/2020    BRIEF HISTORY:  60 year old female with a past medical history that includes liver transplantation was referred to us because of bilateral knee pain.  This pain is been present for 3 years and has been progressive over time.  She is experiencing night pain and initiation pain.  She currently ambulates 95% of the time with a cane.  She walks in and around the house.  For pain medication she is tried Tylenol and NSAIDs with insufficient pain relief.  She has had 3 corticosteroids and 2 Synvisc injections in the past.  Patient has not tried any bracing because of her peripheral lymphedema- she was not able to fit that appropriately.  Patient is currently on cyclosporine immunosuppressants [to be continued throughout the jessi-op period].     INDICATIONS: End-stage osteoarthritis, right knee, bone on bone, grade IV changes. Failed non-operative treatment regarding right knee pain.    HOSPITAL COURSE:    The patient was admitted following the above listed procedures for pain control and rehabilitation. Nicci Pemberton did well post-operatively. Medicine was consulted post operatively to aid in management of medical co-morbidities. The patient received routine nursing cares and at the time of discharge was medically stable. Vital signs were stable throughout admission. The patient is tolerating a regular diet and is voiding spontaneously. All PT/OT goals have been met for safe mobility. Pain is now controlled on oral medications which will be available on discharge. Stool softeners have been used while taking pain medications to help prevent constipation. Nicci MOORE  Niecy is deemed medically safe to discharge.     Antibiotics:  ancef given periop and 24 hours postop.   DVT prophylaxis:  Aspirin 162 mg daily initiated after surgery and will be continued for 4 weeks.   PT Progress:  Has met PT/OT goals for safe mobility.    Weight bearing:  WBAT   Pain Meds:  Weaned off all IV pain meds by discharge.  Inpatient Events: No significant events or complications.     PHYSICAL EXAM:    Gen: No acute distress, resting comfortably in bed.  Resp: Non-labored breathing  MSK:  RLE:  - Dressings c/d/i  - SILT femoral/tibial/sural/saphenous/DP/SP nerves  - Fires TA, EHL, FHL, GaSC  - PT/DP pulses 2+, foot wwp    FOLLOWUP:    Clinic with Dr. Wallace in 2 weeks [Virtual Clinic]    Orthopaedic Surgery appointments are at the [facility] [place] ([address] [place] [address]). Call [phone] to schedule a follow-up appointment at this location with your provider.     PLANNED DISCHARGE ORDERS:      Discharge Medication List as of 10/26/2020 12:44 PM      START taking these medications    Details   !! acetaminophen (TYLENOL) 325 MG tablet Take 2 tablets (650 mg) by mouth every 4 hours, Disp-100 tablet, R-0, Local Print      aspirin (ASA) 81 MG EC tablet Take 2 tablets (162 mg) by mouth 2 times daily, Disp-60 tablet, R-0, E-Prescribe      oxyCODONE (ROXICODONE) 5 MG tablet Take 1-2 tablets (5-10 mg) by mouth every 3 hours as needed for pain (Moderate to Severe), Disp-30 tablet, R-0, Local Print      polyethylene glycol (MIRALAX) 17 g packet Take 17 g by mouth daily, Disp-7 packet, R-0, Local Print      senna-docusate (SENOKOT-S/PERICOLACE) 8.6-50 MG tablet Take 1-2 tablets by mouth 2 times daily Take while on oral narcotics to prevent or treat constipation., Disp-30 tablet, R-0, Local PrintWhile taking narcotics       !! - Potential duplicate medications found. Please discuss with provider.      CONTINUE these medications which have NOT CHANGED    Details   !!  "acetaminophen (TYLENOL) 325 MG tablet Take 325-650 mg by mouth every 6 hours as needed for mild pain, Historical      Cholecalciferol (VITAMIN D-3) 25 MCG (1000 UT) CAPS Take 1,000 Units by mouth 2 times daily, Disp-60 capsule, R-1, E-Prescribe      cycloSPORINE modified (GENERIC EQUIVALENT) 25 MG capsule Take 3 capsules (75 mg) by mouth 2 times daily, Disp-540 capsule,R-3, E-Prescribe      famotidine (PEPCID) 20 MG tablet Take 1 tablet (20 mg) by mouth 2 times daily, Disp-60 tablet, R-3, E-Prescribe      levothyroxine (SYNTHROID/LEVOTHROID) 125 MCG tablet Take 125 mcg by mouth daily , HistoricalManaged by PCP       !! - Potential duplicate medications found. Please discuss with provider.      STOP taking these medications       amoxicillin (AMOXIL) 250 MG capsule Comments:   Reason for Stopping:         aspirin (ASA) 81 MG tablet Comments:   Reason for Stopping:                 Discharge Procedure Orders   Physical Therapy Referral   Standing Status: Future   Referral Priority: Routine Referral Type: Rehab Therapy Physical Therapy   Number of Visits Requested: 1     Reason for your hospital stay   Order Comments: You stayed in the hospital overnight following your surgery     Contact Surgeon Team   Order Comments: You may experience symptoms that require follow-up before your scheduled appointment. Refer to the \"Stoplight Tool\" for instructions on when to contact Dr. Wallace's office if you are concerned about pain control, blood clots, constipation, or if you are unable to urinate.    Regular business hours (Monday - Friday, 8am - 5pm):  [facility]: [phone]    If you have any other concerns during normal business hours, please contact Dr. Wallace's nurse at [phone] during normal business hours 8 am - 5 pm (leave message as needed). If your concern is urgent, please page Dr. Wallace's nurse at [phone] and key in your 10 digit phone number followed by the " # sign after the beep.     After hours and weekends:  [facility] on call Orthopedic resident: [phone]     Orthopedic Urgent Care   Order Comments: If you are not able to reach Dr. Montoya's office and you need immediate care, go to the Orthopedic Urgent Care at your Surgeon's office.  Do NOT go to the Emergency Room unless you have shortness of breath, chest pain, or other signs of a medical emergency.     Call 911   Order Comments: Call 911 immediately if you experience sudden-onset chest pain, arm weakness/numbness, slurred speech, or shortness of breath     Breathing exercises   Order Comments: Perform breathing exercises using your Incentive Spirometer 10 times per hour while awake for 2 weeks.     Fever Management   Order Comments: A low grade fever can be expected after surgery.  Use acetaminophen (TYLENOL) as needed for fever management.  Contact your Surgeon Team if you have a fever greater than 101.5 F, chills, and/or night sweats.     Constipation management   Order Comments: Constipation (hard, dry bowel movements) is expected after surgery due to the combination of being less active, the anesthetic, and the opioid pain medication.  You can do the following to help reduce constipation:  ~  FLUIDS:  Drink clear liquids (water or Gatorade), or juice (apple/prune).  ~  DIET:  Eat a fiber rich diet.    ~  ACTIVITY:  Get up and move around several times a day.  Increase your activity as you are able.  MEDICATIONS:  Reduce the risk of constipation by starting medications before you are constipated.  You can take Miralax   (1 packet as directed) and/or a stool softener (Senokot 1-2 tablets 1-2 times a day).  If you already have constipation and these medications are not working, you can get magnesium citrate and use as directed.  If you continue to have constipation you can try an over the counter suppository or enema.  Call your Surgeon Team if it has been greater than 3 days since your last  bowel movement.     Reduced Urine Output   Order Comments: Changes in the amount of fluids you drank before and after surgery may result in problems urinating.  It is important to stay well-hydrated after surgery and drink plenty of water. If it has been greater than 8 hours since you have urinated despite drinking plenty of water, call your Surgeon Team.     Anticoagulation - aspirin   Order Comments: Take the aspirin as prescribed for a total of four weeks after surgery.  This is given to help minimize your risk of blood clot.     Exercises to prevent blood clots   Order Comments: Unless otherwise directed by your Surgeon team, perform the following exercises at least three times per day for the first four weeks after surgery to prevent blood clots in your legs: 1) Point and flex your feet (Ankle Pumps), 2) Move your ankle around in big circles, 3) Wiggle your toes, 4) Walk, even for short distances, several times a day, will help decrease the risk of blood clots.     Pain after Surgery   Order Comments: Pain after surgery is normal and expected.  You will have some amount of pain for several weeks after surgery.  Your pain will improve with time.  There are several things you can do to help reduce your pain including: rest, ice, elevation, and using pain medications as needed. Contact your Surgeon Team if you have pain that persists or worsens after surgery despite rest, ice, elevation, and taking your medication(s) as prescribed. Contact your Surgeon Team if you have new numbness, tingling, or weakness in your operative extremity.     Swelling after Surgery   Order Comments: Swelling and/or bruising of the surgical extremity is common and may persist for several months after surgery. In addition to frequent icing and elevation, gentle compressive support with an ACE wrap or tubigrip may help with swelling. Apply compression regularly, removing at least twice daily to perform skin checks. Contact your Surgeon  "Team if your swelling increases and is NOT associated with an increase in your activity level, or if your swelling increases and is associated with redness and pain.     LOWER Extremity Elevation   Order Comments: Swelling is expected for several months after surgery. This type of swelling is usually associated with gravity and activity, and can be improved with elevation.   The best way to do this is to get your \"toes above your nose\" by laying down and placing several pillows lengthwise under your calf and heel. When elevating your leg keep your knee completely straight. Perform this elevation as often as possible especially for the first two weeks after surgery.     Cold therapy   Order Comments: Ice can be used to control swelling and discomfort after surgery. Place a thin towel over your operative site and apply the ice pack overtop. Leave ice pack in place for 20 minutes, then remove for 20 minutes. Repeat this 20 minutes on/20 minutes off routine as often as tolerated.     acetaminophen (TYLENOL) Instructions   Order Comments: You were discharged with acetaminophen (TYLENOL) for pain management after surgery. Acetaminophen most effectively manages pain symptoms when it is taken on a schedule without missing doses (every four, six, or eight hours). Your Provider will prescribe a safe daily dose between 3000 - 4000 mg.  Do NOT exceed this daily dose. Most patients use acetaminophen for pain control for the first four weeks after surgery.  You can wean from this medication as your pain decreases.     Opioids - Tapering Instructions   Order Comments: In the first three days following surgery, your symptoms may warrant use of the narcotic pain medication every four to six hours as prescribed. This is normal. As your pain symptoms improve, focus your efforts on decreasing (tapering) use of narcotic medications. The most successful tapering strategy is to first, decrease the number of tablets you take every 4-6 " hours to the minimum prescribed. Then, increase the amount of time between doses.  For example:  First, taper to   or 1 tablet every 4-6 hours.  Then, taper to   or 1 tablet every 6-8 hours.  Then, taper to   or 1 tablet every 8-10 hours.  Then, taper to   or 1 tablet every 10-12 hours.  Then, taper to   or 1 tablet at bedtime.  The bedtime dose can help with comfort during sleep and is typically the last dose to be discontinued after surgery.     Follow Up Care   Order Comments: Please call [phone], option #1, during weekday business hours 8 am - 4:30 pm to schedule your follow up appointment approximately four weeks after surgery.    Your follow up appointments will be at the location that you regularly see Dr. Wallace:    [facility]  [address] [place] [address]  [phone]     Opioid Instructions (Less than 65 years)   Order Comments: You were discharged with an opioid medication (hydromorphone, oxycodone, hydrocodone, or tramadol). This medication should only be taken for breakthrough pain that is not controlled with acetaminophen (TYLENOL). If you rate your pain less than 3 you do not need this medication.  Pain rating 0-3:  You do not need this medication.  Pain rating 4-6:  Take 1 tablet every 4-6 hours as needed  Pain rating 7-10:  Take 2 tablets every 4-6 hours as needed.  Do not exceed 6 tablets per day     Activity - Total Knee Arthroplasty     Order Specific Question Answer Comments   Is discharge order? Yes      Return to Driving   Order Comments: Driving is NOT permitted until directed by your provider. Under no circumstance are you permitted to drive while using opioid pain medications.     Return to athletic activities   Order Comments: Activities such as swimming, bicycling, jogging, running, and stop-and-go sports should be avoided until permitted by your Provider.     Return to school/work   Order Comments: You may return to  work/school when directed by your Provider.     Weight bearing as tolerated   Order Comments: Weight bearing as tolerated on your operative extremity.     Wound care   Order Comments: You have a clean dressing on your surgical wound. Dressing change instructions as follows: remove your dressing in seven days, and leave incision open to air. Contact your Surgeon Team if you have increased redness, warmth around the surgical wound, and/or drainage from the surgical wound.     Shower with wound/dressing covered   Order Comments: You must COVER your dressing or incision with saran wrap (or any other non-permeable covering) to allow the incision to remain dry while showering.  You may shower one day after surgery as long as the surgical wound stays dry. Continue to cover your dressing or incision for showering until your first office visit.  You are strictly prohibited from soaking or submerging the surgical wound underwater.     NO Tub bathing   Order Comments: Tub bathing, swimming, or any other activities that will cause your incision to be submerged in water should be avoided until allowed by your Surgeon.     Red blood cell have available   Standing Status: Standing Number of Occurrences: 1 Standing Exp. Date: 11/20/20   Scheduling Instructions: IRRADIATION is NOT Indicated for: (Scroll to see all items.)  Adults only: Acute or chronic leukemia                      Adults only: Non-Hodgkin's lymphoma  Patients on high dose steroids                                     Solid organ transplant patients  Severe leukopenia, lymphopenia, pancytopenia    To prevent HLA alloimmunization  Use of immune suppressants such as                         HIV  Infection/AIDS      Azathioprine, cyclosporine, MMF  Do NOT change or add to this text.  Use additional instructions field.     Order Specific Question Answer Comments   Irradiation Indication Not irradiated    Red Blood Cells: NON-Irradiated    Number of Units 1    Have  available for Potential blood loss      Regular Diet Adult     Order Specific Question Answer Comments   Is discharge order? Yes      Assign Questionnaire Series to Patient       Delfina Menon MD   Orthopaedic Surgery, PGY-4  Pager: [phone]

## 2020-10-26 NOTE — PROGRESS NOTES
"Seen and examined patient  Slept well last night and pain under much better control   Eating and drinking well  Hoping to discharge today       /56 (BP Location: Left arm)   Pulse 89   Temp 98.5  F (36.9  C) (Oral)   Resp 16   Ht 1.575 m (5' 2.01\")   Wt 93 kg (205 lb 0.4 oz)   SpO2 96%   BMI 37.49 kg/m    CVS: Normal Heart sounds   RS: Clear breath sounds bilaterally   Abdomen: Soft and non tender. Normal bowel sounds.  Neuro: Alert and orientated X 3   MSK: Rt knee with intact dressings. No distal deficits     Labs reviewed. Post op hgb at 9.3    Nicci Pemberton is a 60 year old female admitted on 10/23/2020 s/p Rt TKA. She has a know h/o S/P Liver transplant for ANGULO. Internal Medicine following for postoperative co management      #S/P Rt Knee arthroplasty, POD#3:     Post Op Management per Primary team.   Post op capnography per protocol.   Pain control- per Primary team.   Minimize use of narcotics as able.   Encourage Incentive spirometry to prevent atelectasis.  DVT prophylaxis- per Primary team - I discussed this with a team, given that patient has borderline low platelets, and she has tolerated aspirin in the past, consider aspirin 162 mg   Activity, antibiotics, wound and/or drain care - per Primary team.    Anemia of acute blood loss:  Monitor hgb for anemia of acute blood loss. Transfuse for hgb <7.0  Post op hgb at 9.3    History of liver transplant:  Continue prior cyclosporine 75 mg twice daily  LFT  Postoperatively within acceptable limits      Hypothyroidism:   Continue prior levothyroxine 125 mcg daily     Gastroesophageal reflux disease:  Continue prior famotidine 20 mg twice daily as needed     CKD:   Recent creatinine ranges between 1.1-1.2.  Stable BMP       Dr JIMENEZ Moya MD, FACP  Hospitalist ( Internal medicine)  Pager: [phone]    Medically stable to discharge   "

## 2020-10-28 ENCOUNTER — TELEPHONE (OUTPATIENT)
Dept: ORTHOPEDICS | Facility: CLINIC | Age: 60
End: 2020-10-28

## 2020-10-28 ENCOUNTER — APPOINTMENT (OUTPATIENT)
Dept: ULTRASOUND IMAGING | Facility: CLINIC | Age: 60
End: 2020-10-28
Attending: PHYSICIAN ASSISTANT
Payer: COMMERCIAL

## 2020-10-28 ENCOUNTER — OFFICE VISIT (OUTPATIENT)
Dept: URGENT CARE | Facility: URGENT CARE | Age: 60
End: 2020-10-28
Payer: COMMERCIAL

## 2020-10-28 ENCOUNTER — HOSPITAL ENCOUNTER (EMERGENCY)
Facility: CLINIC | Age: 60
Discharge: ANOTHER HOSPITAL | End: 2020-10-28
Attending: PHYSICIAN ASSISTANT | Admitting: PHYSICIAN ASSISTANT
Payer: COMMERCIAL

## 2020-10-28 ENCOUNTER — HOSPITAL ENCOUNTER (OUTPATIENT)
Dept: PHYSICAL THERAPY | Facility: CLINIC | Age: 60
Setting detail: THERAPIES SERIES
End: 2020-10-28
Attending: ORTHOPAEDIC SURGERY
Payer: COMMERCIAL

## 2020-10-28 VITALS
RESPIRATION RATE: 16 BRPM | HEART RATE: 78 BPM | OXYGEN SATURATION: 98 % | SYSTOLIC BLOOD PRESSURE: 117 MMHG | WEIGHT: 213 LBS | TEMPERATURE: 98.8 F | DIASTOLIC BLOOD PRESSURE: 69 MMHG | BODY MASS INDEX: 38.95 KG/M2

## 2020-10-28 VITALS
TEMPERATURE: 98.4 F | RESPIRATION RATE: 16 BRPM | DIASTOLIC BLOOD PRESSURE: 72 MMHG | HEART RATE: 80 BPM | SYSTOLIC BLOOD PRESSURE: 120 MMHG | BODY MASS INDEX: 38.95 KG/M2 | WEIGHT: 213 LBS

## 2020-10-28 DIAGNOSIS — I89.0 ACQUIRED LYMPHEDEMA OF LEG: ICD-10-CM

## 2020-10-28 DIAGNOSIS — T81.9XXA POST-OPERATIVE COMPLICATION: ICD-10-CM

## 2020-10-28 DIAGNOSIS — L03.115 CELLULITIS OF RIGHT LOWER EXTREMITY: ICD-10-CM

## 2020-10-28 DIAGNOSIS — D84.9 IMMUNOSUPPRESSED STATUS (H): Chronic | ICD-10-CM

## 2020-10-28 DIAGNOSIS — Z96.651 S/P TKR (TOTAL KNEE REPLACEMENT), RIGHT: Primary | ICD-10-CM

## 2020-10-28 DIAGNOSIS — Z94.4 STATUS POST LIVER TRANSPLANTATION (H): Chronic | ICD-10-CM

## 2020-10-28 LAB
ALBUMIN SERPL-MCNC: 3.1 G/DL (ref 3.4–5)
ALP SERPL-CCNC: 137 U/L (ref 40–150)
ALT SERPL W P-5'-P-CCNC: 18 U/L (ref 0–50)
ANION GAP SERPL CALCULATED.3IONS-SCNC: 3 MMOL/L (ref 3–14)
AST SERPL W P-5'-P-CCNC: 28 U/L (ref 0–45)
BASOPHILS # BLD AUTO: 0 10E9/L (ref 0–0.2)
BASOPHILS NFR BLD AUTO: 0.2 %
BILIRUB SERPL-MCNC: 1.6 MG/DL (ref 0.2–1.3)
BUN SERPL-MCNC: 22 MG/DL (ref 7–30)
CALCIUM SERPL-MCNC: 9.4 MG/DL (ref 8.5–10.1)
CHLORIDE SERPL-SCNC: 100 MMOL/L (ref 94–109)
CO2 SERPL-SCNC: 32 MMOL/L (ref 20–32)
CREAT SERPL-MCNC: 1.1 MG/DL (ref 0.52–1.04)
CRP SERPL-MCNC: 108 MG/L (ref 0–8)
DIFFERENTIAL METHOD BLD: ABNORMAL
EOSINOPHIL # BLD AUTO: 0.3 10E9/L (ref 0–0.7)
EOSINOPHIL NFR BLD AUTO: 5 %
ERYTHROCYTE [DISTWIDTH] IN BLOOD BY AUTOMATED COUNT: 11.9 % (ref 10–15)
ERYTHROCYTE [SEDIMENTATION RATE] IN BLOOD BY WESTERGREN METHOD: 100 MM/H (ref 0–30)
GFR SERPL CREATININE-BSD FRML MDRD: 54 ML/MIN/{1.73_M2}
GLUCOSE SERPL-MCNC: 94 MG/DL (ref 70–99)
HCT VFR BLD AUTO: 26.2 % (ref 35–47)
HGB BLD-MCNC: 8.9 G/DL (ref 11.7–15.7)
IMM GRANULOCYTES # BLD: 0 10E9/L (ref 0–0.4)
IMM GRANULOCYTES NFR BLD: 0.7 %
LACTATE BLD-SCNC: 0.8 MMOL/L (ref 0.7–2)
LYMPHOCYTES # BLD AUTO: 0.8 10E9/L (ref 0.8–5.3)
LYMPHOCYTES NFR BLD AUTO: 12.5 %
MCH RBC QN AUTO: 33.6 PG (ref 26.5–33)
MCHC RBC AUTO-ENTMCNC: 34 G/DL (ref 31.5–36.5)
MCV RBC AUTO: 99 FL (ref 78–100)
MONOCYTES # BLD AUTO: 0.8 10E9/L (ref 0–1.3)
MONOCYTES NFR BLD AUTO: 13.5 %
NEUTROPHILS # BLD AUTO: 4.1 10E9/L (ref 1.6–8.3)
NEUTROPHILS NFR BLD AUTO: 68.1 %
NRBC # BLD AUTO: 0 10*3/UL
NRBC BLD AUTO-RTO: 0 /100
PLATELET # BLD AUTO: 160 10E9/L (ref 150–450)
POTASSIUM SERPL-SCNC: 4 MMOL/L (ref 3.4–5.3)
PROT SERPL-MCNC: 7.5 G/DL (ref 6.8–8.8)
RBC # BLD AUTO: 2.65 10E12/L (ref 3.8–5.2)
SODIUM SERPL-SCNC: 135 MMOL/L (ref 133–144)
WBC # BLD AUTO: 6 10E9/L (ref 4–11)

## 2020-10-28 PROCEDURE — 36415 COLL VENOUS BLD VENIPUNCTURE: CPT | Performed by: PHYSICIAN ASSISTANT

## 2020-10-28 PROCEDURE — 93971 EXTREMITY STUDY: CPT | Mod: RT

## 2020-10-28 PROCEDURE — 250N000012 HC RX MED GY IP 250 OP 636 PS 637: Performed by: PHYSICIAN ASSISTANT

## 2020-10-28 PROCEDURE — 250N000013 HC RX MED GY IP 250 OP 250 PS 637: Performed by: PHYSICIAN ASSISTANT

## 2020-10-28 PROCEDURE — 99285 EMERGENCY DEPT VISIT HI MDM: CPT | Mod: 25 | Performed by: PHYSICIAN ASSISTANT

## 2020-10-28 PROCEDURE — 97161 PT EVAL LOW COMPLEX 20 MIN: CPT | Mod: GP | Performed by: PHYSICAL MEDICINE & REHABILITATION

## 2020-10-28 PROCEDURE — 85652 RBC SED RATE AUTOMATED: CPT | Performed by: PHYSICIAN ASSISTANT

## 2020-10-28 PROCEDURE — 99215 OFFICE O/P EST HI 40 MIN: CPT | Performed by: NURSE PRACTITIONER

## 2020-10-28 PROCEDURE — 83605 ASSAY OF LACTIC ACID: CPT | Performed by: PHYSICIAN ASSISTANT

## 2020-10-28 PROCEDURE — 99285 EMERGENCY DEPT VISIT HI MDM: CPT | Performed by: PHYSICIAN ASSISTANT

## 2020-10-28 PROCEDURE — 97110 THERAPEUTIC EXERCISES: CPT | Mod: GP | Performed by: PHYSICAL MEDICINE & REHABILITATION

## 2020-10-28 PROCEDURE — 80053 COMPREHEN METABOLIC PANEL: CPT | Performed by: PHYSICIAN ASSISTANT

## 2020-10-28 PROCEDURE — 250N000011 HC RX IP 250 OP 636: Performed by: PHYSICIAN ASSISTANT

## 2020-10-28 PROCEDURE — 96374 THER/PROPH/DIAG INJ IV PUSH: CPT | Performed by: PHYSICIAN ASSISTANT

## 2020-10-28 PROCEDURE — 86140 C-REACTIVE PROTEIN: CPT | Performed by: PHYSICIAN ASSISTANT

## 2020-10-28 PROCEDURE — 85025 COMPLETE CBC W/AUTO DIFF WBC: CPT | Performed by: PHYSICIAN ASSISTANT

## 2020-10-28 RX ORDER — ASPIRIN 81 MG/1
162 TABLET, CHEWABLE ORAL ONCE
Status: COMPLETED | OUTPATIENT
Start: 2020-10-28 | End: 2020-10-28

## 2020-10-28 RX ORDER — SENNOSIDES 8.6 MG
2 TABLET ORAL ONCE
Status: COMPLETED | OUTPATIENT
Start: 2020-10-28 | End: 2020-10-28

## 2020-10-28 RX ORDER — ONDANSETRON 4 MG/1
4 TABLET, ORALLY DISINTEGRATING ORAL ONCE
Status: COMPLETED | OUTPATIENT
Start: 2020-10-28 | End: 2020-10-28

## 2020-10-28 RX ORDER — CYCLOSPORINE 25 MG/1
75 CAPSULE, LIQUID FILLED ORAL ONCE
Status: COMPLETED | OUTPATIENT
Start: 2020-10-28 | End: 2020-10-28

## 2020-10-28 RX ORDER — OXYCODONE HYDROCHLORIDE 5 MG/1
5 TABLET ORAL ONCE
Status: COMPLETED | OUTPATIENT
Start: 2020-10-28 | End: 2020-10-28

## 2020-10-28 RX ORDER — ACETAMINOPHEN 325 MG/1
650 TABLET ORAL ONCE
Status: COMPLETED | OUTPATIENT
Start: 2020-10-28 | End: 2020-10-28

## 2020-10-28 RX ORDER — CEFAZOLIN SODIUM 2 G/100ML
2 INJECTION, SOLUTION INTRAVENOUS EVERY 8 HOURS
Status: DISCONTINUED | OUTPATIENT
Start: 2020-10-28 | End: 2020-10-29 | Stop reason: HOSPADM

## 2020-10-28 RX ORDER — CEFAZOLIN SODIUM 2 G/100ML
2 INJECTION, SOLUTION INTRAVENOUS EVERY 24 HOURS
Status: DISCONTINUED | OUTPATIENT
Start: 2020-10-29 | End: 2020-10-28 | Stop reason: DRUGHIGH

## 2020-10-28 RX ORDER — OXYCODONE HYDROCHLORIDE 5 MG/1
5 TABLET ORAL EVERY 4 HOURS PRN
Status: DISCONTINUED | OUTPATIENT
Start: 2020-10-28 | End: 2020-10-29 | Stop reason: HOSPADM

## 2020-10-28 RX ORDER — FAMOTIDINE 20 MG/1
20 TABLET, FILM COATED ORAL ONCE
Status: COMPLETED | OUTPATIENT
Start: 2020-10-28 | End: 2020-10-28

## 2020-10-28 RX ORDER — CEFAZOLIN SODIUM 2 G/100ML
2 INJECTION, SOLUTION INTRAVENOUS EVERY 24 HOURS
Status: DISCONTINUED | OUTPATIENT
Start: 2020-10-28 | End: 2020-10-28

## 2020-10-28 RX ADMIN — SENNOSIDES 2 TABLET: 8.6 TABLET, FILM COATED ORAL at 21:14

## 2020-10-28 RX ADMIN — ACETAMINOPHEN 650 MG: 325 TABLET, FILM COATED ORAL at 21:14

## 2020-10-28 RX ADMIN — ASPIRIN 81 MG 162 MG: 81 TABLET ORAL at 21:14

## 2020-10-28 RX ADMIN — CEFAZOLIN SODIUM 2 G: 2 INJECTION, SOLUTION INTRAVENOUS at 19:44

## 2020-10-28 RX ADMIN — CYCLOSPORINE 75 MG: 25 CAPSULE, LIQUID FILLED ORAL at 21:14

## 2020-10-28 RX ADMIN — ONDANSETRON 4 MG: 4 TABLET, ORALLY DISINTEGRATING ORAL at 16:10

## 2020-10-28 RX ADMIN — FAMOTIDINE 20 MG: 20 TABLET, FILM COATED ORAL at 21:14

## 2020-10-28 RX ADMIN — OXYCODONE HYDROCHLORIDE 5 MG: 5 TABLET ORAL at 16:10

## 2020-10-28 ASSESSMENT — ENCOUNTER SYMPTOMS
EYE REDNESS: 0
WEAKNESS: 0
DIFFICULTY URINATING: 0
JOINT SWELLING: 0
CONFUSION: 0
HEADACHES: 0
DIAPHORESIS: 0
CHILLS: 0
SHORTNESS OF BREATH: 0
ARTHRALGIAS: 0
APPETITE CHANGE: 0
COLOR CHANGE: 0
NUMBNESS: 0
RESPIRATORY NEGATIVE: 1
ABDOMINAL PAIN: 0
FATIGUE: 0
NECK STIFFNESS: 0
FEVER: 0
CONSTITUTIONAL NEGATIVE: 1
ACTIVITY CHANGE: 0

## 2020-10-28 ASSESSMENT — PAIN SCALES - GENERAL: PAINLEVEL: EXTREME PAIN (9)

## 2020-10-28 NOTE — PROGRESS NOTES
[facility]: Post-Discharge Note  SITUATION                                                      Admission:    Admission Date: 10/23/20   Reason for Admission: Chronic pain of both knees  Discharge:   Discharge Date: 10/26/20  Discharge Diagnosis: Chronic pain of both knees    BACKGROUND                                                      BRIEF HISTORY:  60 year old female with a past medical history that includes liver transplantation was referred to us because of bilateral knee pain.  This pain is been present for 3 years and has been progressive over time.  She is experiencing night pain and initiation pain.  She currently ambulates 95% of the time with a cane.  She walks in and around the house.  For pain medication she is tried Tylenol and NSAIDs with insufficient pain relief.  She has had 3 corticosteroids and 2 Synvisc injections in the past.  Patient has not tried any bracing because of her peripheral lymphedema- she was not able to fit that appropriately.  Patient is currently on cyclosporine immunosuppressants [to be continued throughout the jessi-op period].    ASSESSMENT      Discharge Assessment  Patient reports symptoms are: Improved  Does the patient have all of their medications?: Yes  Does patient know what their new medications are for?: Yes  Does patient have a follow-up appointment scheduled?: Yes  Does patient have any other questions or concerns?: No    Post-op  Did the patient have surgery or a procedure: Yes  Incision: healing  Drainage: No  Bleeding: none  Fever: No  Chills: No  Redness: Yes  Warmth: Yes  Swelling: Yes  Incision site pain: Yes  Eating & Drinking: eating and drinking without complaints/concerns  PO Intake: regular diet  Bowel Function: constipation  Urinary Status: voiding without complaint/concerns        PLAN                                                      Outpatient Plan:  FOLLOWUP:    Clinic with Dr. Wallace in 2 weeks [Virtual  Clinic]     Orthopaedic Surgery appointments are at the [facility] ([address] [place] [id number]). Call [phone] to schedule a follow-up appointment at this location with your provider.     Future Appointments   Date Time Provider Department Center   10/28/2020 10:00 AM Martita Milligan PT [facility]   11/23/2020  9:45 AM  LAB [facility]   11/23/2020 10:20 AM Mario Wallace MD [facility]   2/4/2021  9:30 AM  LAB [facility]   2/4/2021 10:00 AM Leventhal, Thomas Michael, MD [facility]           Wanda Kong [facility]

## 2020-10-28 NOTE — ED PROVIDER NOTES
History     Chief Complaint   Patient presents with     Post-op Problem     knee replacement friday     HPI  Nicci Pemberton is a 60 year old female with history of essential hypertension, GERD, liver transplant on August 18, 2019, lymphedema, and osteoarthritis who presents to the Emergency Room today with  for the concerns of redness to the right lower leg that has slightly increased today since her right total knee arthroplasty that was done on 10-23-20. Patient states she has had redness to the lower leg since the surgery, but it seems a little more pronounced today and warm today. Patient states increased swelling to the right lower leg also today. Patient denies fevers, chills, sweats, chest pain, cough, dizziness, red streaking up leg, calf pain, shortness of breath, or rash. Patient states that she has not changed the dressings yet. Patient states she has been taking tylenol and oxycodone as directed for pain control. Her last dose of anything was earlier this morning. Patient states she has been eating and drinking well.     Allergies:  Allergies   Allergen Reactions     Ciprofloxacin Dizziness and Nausea     Food      Fruits with cores and pits  Apples     Sulfa Drugs Unknown     Swollen joints     Furosemide Rash       Problem List:    Patient Active Problem List    Diagnosis Date Noted     Abnormal liver function 06/25/2020     Priority: Medium     Essential hypertension 06/25/2020     Priority: Medium     Gallbladder calculus with acute cholecystitis and no obstruction 06/25/2020     Priority: Medium     Gastroesophageal reflux disease without esophagitis 06/25/2020     Priority: Medium     Hyponatremia 06/25/2020     Priority: Medium     Lymphedema 06/25/2020     Priority: Medium     Macrocytosis without anemia 06/25/2020     Priority: Medium     Malaise 06/25/2020     Priority: Medium     Nonalcoholic fatty liver disease 06/25/2020     Priority: Medium     Obstruction of bile duct 06/25/2020      Priority: Medium     Osteoarthritis of knee 06/25/2020     Priority: Medium     Postmenopausal bleeding 06/25/2020     Priority: Medium     Slow transit constipation 06/25/2020     Priority: Medium     Thrombocytopenia (H) 06/25/2020     Priority: Medium     Zinc deficiency 06/25/2020     Priority: Medium     Biliary stricture 03/10/2020     Priority: Medium     Added automatically from request for surgery [id number]       Cholangitis 03/04/2020     Priority: Medium     Bile duct stricture 02/12/2020     Priority: Medium     Added automatically from request for surgery [id number]       Otitis media 10/06/2019     Priority: Medium     Vertigo 10/04/2019     Priority: Medium     History of liver recipient (H) 09/23/2019     Priority: Medium     C. difficile diarrhea 09/19/2019     Priority: Medium     Steroid-induced hyperglycemia 09/19/2019     Priority: Medium     Immunosuppressed status (H) 09/19/2019     Priority: Medium     Status post liver transplantation (H) 08/18/2019     Priority: Medium     Enterocolitis 07/31/2019     Priority: Medium     Chronic pain of both knees 07/29/2019     Priority: Medium     Venous hypertension of lower extremity, bilateral 07/29/2019     Priority: Medium     Cramp in lower extremity associated with sleep 07/29/2019     Priority: Medium     Leg swelling 07/29/2019     Priority: Medium     Osteoarthritis of both knees, unspecified osteoarthritis type 07/29/2019     Priority: Medium     Liver cirrhosis secondary to ANGULO (H)      Priority: Medium     Heterozygous alpha 1-antitrypsin deficiency (H)      Priority: Medium     Iron overload      Priority: Medium     Obesity with serious comorbidity 08/23/2017     Priority: Medium     Acquired lymphedema of leg 11/23/2016     Priority: Medium     Edema 11/23/2016     Priority: Medium     Hypothyroidism 11/23/2016     Priority: Medium     Peripheral neuropathy 11/23/2016     Priority: Medium     Vitamin D deficiency 11/23/2016     Priority:  Medium        Past Medical History:    Past Medical History:   Diagnosis Date     Anemia      Cellulitis      Cirrhosis of liver (H) 6/18/2018     Heterozygous alpha 1-antitrypsin deficiency (H)      High hepatic iron concentration determined by biopsy of liver      Liver cirrhosis secondary to ANGULO (H)      Liver transplanted (H) 08/18/2019     Lymphedema      Osteoarthritis      SBP (spontaneous bacterial peritonitis) (H) 8/2/2019       Past Surgical History:    Past Surgical History:   Procedure Laterality Date     ARTHROPLASTY KNEE Right 10/23/2020    Procedure: Right  total knee arthroplasty;  Surgeon: Mario Wallace MD;  Location: [facility]     BENCH LIVER N/A 8/18/2019    Procedure: BACKBENCH PREPARATION, LIVER;  Surgeon: Mandeep Alford MD;  Location: [facility]     COLONOSCOPY N/A 6/22/2018    Procedure: COLONOSCOPY;  colonoscopy;  Surgeon: Hugo Patel MD;  [facility]: [facility]     ENDOSCOPIC RETROGRADE CHOLANGIOPANCREATOGRAM N/A 2/10/2020    Procedure: ENDOSCOPIC RETROGRADE CHOLANGIOPANCREATOGRAPHY with spinchterotomy;  Surgeon: Guru Rigoberto Canada MD;  Location: [facility]     ENDOSCOPIC RETROGRADE CHOLANGIOPANCREATOGRAM N/A 5/13/2020    Procedure: ENDOSCOPIC RETROGRADE CHOLANGIOPANCREATOGRAPHY,Stent exchange and sludge removal;  Surgeon: Guru Rigoberto Canada MD;  [facility]: [facility]     ENDOSCOPIC RETROGRADE CHOLANGIOPANCREATOGRAPHY, EXCHANGE TUBE/STENT N/A 3/4/2020    Procedure: ENDOSCOPIC RETROGRADE CHOLANGIOPANCREATOGRAPHY, WITH biliary dilarion, stent exchange, stone removal;  Surgeon: Guru Rigoberto Canada MD;  Location: [facility]     ESOPHAGOSCOPY, GASTROSCOPY, DUODENOSCOPY (EGD), COMBINED N/A 10/31/2019    Procedure: Esophagogastroduodenoscopy, With Biopsy;  Surgeon: Nerissa Aranda MD;  Location: [facility] GI     IR FEEDING TUBE PLACEMENT MERCEDEZ PRESTON/MD  9/6/2019     IR FLUORO 0-1 HOUR  9/5/2019     IR LUMBAR PUNCTURE  9/5/2019     TRANSPLANT LIVER RECIPIENT   DONOR N/A 2019    Procedure: TRANSPLANT, LIVER, RECIPIENT,  DONOR;  Surgeon: Mandeep Alford MD;  Location:  OR       Family History:    Family History   Problem Relation Age of Onset     Cirrhosis No family hx of      Liver Cancer No family hx of        Social History:  Marital Status:   [2]  Social History     Tobacco Use     Smoking status: Former Smoker     Packs/day: 0.00     Quit date:      Years since quittin.8     Smokeless tobacco: Never Used   Substance Use Topics     Alcohol use: No     Drug use: No        Medications:         acetaminophen (TYLENOL) 325 MG tablet       aspirin (ASA) 81 MG EC tablet       cycloSPORINE modified (GENERIC EQUIVALENT) 25 MG capsule       famotidine (PEPCID) 20 MG tablet       levothyroxine (SYNTHROID/LEVOTHROID) 125 MCG tablet       oxyCODONE (ROXICODONE) 5 MG tablet       polyethylene glycol (MIRALAX) 17 g packet       senna-docusate (SENOKOT-S/PERICOLACE) 8.6-50 MG tablet       Cholecalciferol (VITAMIN D-3) 25 MCG (1000 UT) CAPS          Review of Systems   Constitutional: Negative.  Negative for activity change, appetite change, chills, diaphoresis, fatigue and fever.   HENT: Negative.  Negative for congestion.    Eyes: Negative for redness.   Respiratory: Negative.  Negative for shortness of breath.    Cardiovascular: Negative for chest pain.   Gastrointestinal: Negative for abdominal pain.   Genitourinary: Negative for difficulty urinating.   Musculoskeletal: Negative for arthralgias, gait problem, joint swelling and neck stiffness.        Right lower leg swelling, redness, and slight warmth. Increased over the past day.    Skin: Negative for color change and rash.        Status post right knee surgery with healing wound.    Neurological: Negative for weakness, numbness and headaches.   Psychiatric/Behavioral: Negative for confusion.   All other systems reviewed and are negative.      Physical Exam   BP: 126/79  Pulse:  96  Temp: 98.8  F (37.1  C)  Resp: 16  Weight: 96.6 kg (213 lb)  SpO2: 97 %      Physical Exam  Vitals signs and nursing note reviewed.   Constitutional:       General: She is not in acute distress.     Appearance: Normal appearance. She is normal weight. She is not ill-appearing, toxic-appearing or diaphoretic.   Cardiovascular:      Rate and Rhythm: Normal rate and regular rhythm.      Pulses: Normal pulses.      Heart sounds: Normal heart sounds.   Pulmonary:      Effort: Pulmonary effort is normal.      Breath sounds: Normal breath sounds.   Musculoskeletal:         General: Tenderness (mild over area of erythema and warm) present.      Right knee: She exhibits normal range of motion, no swelling, no effusion and no ecchymosis. No tenderness found.      Right ankle: Normal. Achilles tendon normal.      Right lower leg: Edema present.      Left lower leg: No edema.      Comments: Patient does have good range of motion in the right knee. Patient neurovascularly intact.    Skin:     General: Skin is warm.      Capillary Refill: Capillary refill takes less than 2 seconds.      Findings: Erythema (erythema to the right lower leg; see images below. ) present.      Comments: Healing surgical site with no wound dehiscence noted.    Neurological:      General: No focal deficit present.      Mental Status: She is alert and oriented to person, place, and time.   Psychiatric:         Mood and Affect: Mood normal.         Behavior: Behavior normal.         Thought Content: Thought content normal.         Judgment: Judgment normal.                          ED Course        Procedures              Critical Care time:  none               Results for orders placed or performed during the hospital encounter of 10/28/20 (from the past 24 hour(s))   Lactic acid whole blood   Result Value Ref Range    Lactic Acid 0.8 0.7 - 2.0 mmol/L   Erythrocyte sedimentation rate auto   Result Value Ref Range    Sed Rate 100 (H) 0 - 30 mm/h   CRP  inflammation   Result Value Ref Range    CRP Inflammation 108.0 (H) 0.0 - 8.0 mg/L   Comprehensive metabolic panel   Result Value Ref Range    Sodium 135 133 - 144 mmol/L    Potassium 4.0 3.4 - 5.3 mmol/L    Chloride 100 94 - 109 mmol/L    Carbon Dioxide 32 20 - 32 mmol/L    Anion Gap 3 3 - 14 mmol/L    Glucose 94 70 - 99 mg/dL    Urea Nitrogen 22 7 - 30 mg/dL    Creatinine 1.10 (H) 0.52 - 1.04 mg/dL    GFR Estimate 54 (L) >60 mL/min/[1.73_m2]    GFR Estimate If Black 63 >60 mL/min/[1.73_m2]    Calcium 9.4 8.5 - 10.1 mg/dL    Bilirubin Total 1.6 (H) 0.2 - 1.3 mg/dL    Albumin 3.1 (L) 3.4 - 5.0 g/dL    Protein Total 7.5 6.8 - 8.8 g/dL    Alkaline Phosphatase 137 40 - 150 U/L    ALT 18 0 - 50 U/L    AST 28 0 - 45 U/L   CBC with platelets, differential   Result Value Ref Range    WBC 6.0 4.0 - 11.0 10e9/L    RBC Count 2.65 (L) 3.8 - 5.2 10e12/L    Hemoglobin 8.9 (L) 11.7 - 15.7 g/dL    Hematocrit 26.2 (L) 35.0 - 47.0 %    MCV 99 78 - 100 fl    MCH 33.6 (H) 26.5 - 33.0 pg    MCHC 34.0 31.5 - 36.5 g/dL    RDW 11.9 10.0 - 15.0 %    Platelet Count 160 150 - 450 10e9/L    Diff Method Automated Method     % Neutrophils 68.1 %    % Lymphocytes 12.5 %    % Monocytes 13.5 %    % Eosinophils 5.0 %    % Basophils 0.2 %    % Immature Granulocytes 0.7 %    Nucleated RBCs 0 0 /100    Absolute Neutrophil 4.1 1.6 - 8.3 10e9/L    Absolute Lymphocytes 0.8 0.8 - 5.3 10e9/L    Absolute Monocytes 0.8 0.0 - 1.3 10e9/L    Absolute Eosinophils 0.3 0.0 - 0.7 10e9/L    Absolute Basophils 0.0 0.0 - 0.2 10e9/L    Abs Immature Granulocytes 0.0 0 - 0.4 10e9/L    Absolute Nucleated RBC 0.0    US Lower Extremity Venous Duplex Right    Narrative    US LOWER EXTREMITY VENOUS DUPLEX RIGHT   10/28/2020 5:31 PM     HISTORY: Status post knee surgery with swelling and erythema to the  right lower extremity.    COMPARISON: None available.    FINDINGS: Gray-scale, color and Doppler spectral analysis ultrasound  was performed of the right lower extremity.  Compression and  augmentation imaging was performed.    No evidence of deep venous thrombosis in the right lower extremity.       Impression    IMPRESSION: No evidence of deep venous thrombosis in the right lower  extremity.     MONA MCBRIDE MD       Medications   ceFAZolin (ANCEF) intermittent infusion 2 g in 100 mL dextrose PRE-MIX (has no administration in time range)   oxyCODONE (ROXICODONE) tablet 5 mg (has no administration in time range)   ondansetron (ZOFRAN-ODT) ODT tab 4 mg (4 mg Oral Given 10/28/20 1610)   oxyCODONE (ROXICODONE) tablet 5 mg (5 mg Oral Given 10/28/20 1610)   acetaminophen (TYLENOL) tablet 650 mg (650 mg Oral Given 10/28/20 2114)   aspirin (ASA) chewable tablet 162 mg (162 mg Oral Given 10/28/20 2114)   cycloSPORINE modified (GENERIC EQUIVALENT) capsule 75 mg (75 mg Oral Given 10/28/20 2114)   famotidine (PEPCID) tablet 20 mg (20 mg Oral Given 10/28/20 2114)   sennosides (SENOKOT) tablet 2 tablet (2 tablets Oral Given 10/28/20 2114)       Assessments & Plan (with Medical Decision Making)     I have reviewed the nursing notes.    I have reviewed the findings, diagnosis, plan and need for follow up with the patient.    Nicci Pemberton is a 60 year old female with history of essential hypertension, GERD, liver transplant on August 18, 2019, lymphedema, and osteoarthritis who presents to the Emergency Room today with  for the concerns of redness to the right lower leg that has slightly increased today since her right total knee arthroplasty that was done on 10-23-20. Patient states she has had redness to the lower leg since the surgery, but it seems a little more pronounced today and warm today. Patient states increased swelling to the right lower leg also today. Patient denies fevers, chills, sweats, chest pain, cough, dizziness, red streaking up leg, calf pain, shortness of breath, or rash. Patient states that she has not changed the dressings yet. Patient states she has been  taking tylenol and oxycodone as directed for pain control. Her last dose of anything was earlier this morning. Patient states she has been eating and drinking well.     See exam findings above.  Lactic acid within normal limits, sed rate slightly elevated at 100, CRP slightly elevated at 108, CBC with WBC in normal limits, hemoglobin low at 8.9 however this is not a significant drop from her previous hemoglobin.  Comprehensive metabolic panel without any significant abnormalities.  Venous Doppler of the right lower extremity was negative for DVT.  I had Dr. Macias come in and examine patient also and he agrees with plan the patient needs to be admitted for cellulitis and given IV antibiotics.  I called and spoke to the on-call orthopedic surgeon at [facility] Dr. St who agrees patient needs to be admitted and was excepting physician.  He did want me to run the patient past the hospitalist.  I spoke to Dr. Meier at [facility] and informed him of patient. Patient given dose of ancef 2g Q 8 hrs.  Patient is none toxic in no acute distress, and at this time no concerns for infection into the joint.     New Prescriptions    No medications on file       Final diagnoses:   Cellulitis of right lower extremity   Post-operative complication - status post right knee arthroplasty on 10/23/20 with right lower leg cellulitis       10/28/2020   [facility] EMERGENCY DEPT     Donna Mondragon PA-C  11/02/20 [id number]

## 2020-10-28 NOTE — ED NOTES
Right TKA on 10/23/20.   Pt had PT today and was sent to [facility] to assess for infection due to some redness.    Pt's right leg edematous and with clean wound.   Applied ice to right leg.  Provider in room.

## 2020-10-28 NOTE — TELEPHONE ENCOUNTER
RN called and left detailed VM to patient again.  Previously, RN has called and left her message regarding this symptoms. RN provided call back number for patient to keep us posted. RN also reiterated that patient should ice and elevate her knees to reduce swelling.    Colten Avila RN      [facility] Call Center    Phone Message    May a detailed message be left on voicemail: yes     Reason for Call: Symptoms or Concerns     If patient has red-flag symptoms, warm transfer to triage line    Current symptom or concern: Very warm to the touch / redness on surgery site     Symptoms have been present for:  2 day(s)    Has patient previously been seen for this? No    Are there any new or worsening symptoms? No      Action Taken: Message routed to:  [facility] Ortho    Travel Screening: Not Applicable    Patient is going to the Urgent Care in [place] b/c she was expiriencing warm to the touch / redness on her post op surgery site            If you guys Within next 2 hours please call Cell Otherwise call Temporary #

## 2020-10-28 NOTE — PROGRESS NOTES
"   10/28/20 1000   General Information   Type of Visit Initial OP Ortho PT Evaluation   Start of Care Date 10/28/20   Referring Physician Mario Wallace MD   Patient/Family Goals Statement improve standing, pain, walking without AD, squat, kneeling   Orders Evaluate and Treat   Date of Order 10/23/20   Certification Required? No   Medical Diagnosis Chronic pain of both knees   Surgical/Medical history reviewed Yes   Precautions/Limitations no known precautions/limitations   General Information Comments PMHx per pt report: liver transplant, smoking (quit 10/3/11)   Body Part(s)   Body Part(s) Knee   Presentation and Etiology   Pertinent history of current problem (include personal factors and/or comorbidities that impact the POC) Pt arrived to PT today for R knee pain s/p TKA performed 10/23/20. Pt reports knee is painful but not as bad with walking as she thought it would be. Notes burning pain along incision. Reports pain in thigh and primarily R knee. No radiation of sx into R ankle.    Impairments A. Pain;B. Decreased WB tolerance;C. Swelling;D. Decreased ROM;E. Decreased flexibility;F. Decreased strength and endurance;G. Impaired balance;H. Impaired gait;I. Impaired skin integrity;J. Burning;P. Bowel or bladder problems   Functional Limitations perform activities of daily living;perform desired leisure / sports activities   Symptom Location R knee   How/Where did it occur Other  (s/p TKA)   Onset date of current episode/exacerbation 10/23/20   Chronicity New   Pain rating (0-10 point scale) Best (/10);Worst (/10)   Best (/10) 4   Worst (/10) 10   Pain quality C. Aching;D. Burning;H. Other   Pain quality comment \"tightness\"   Frequency of pain/symptoms A. Constant   Pain/symptoms are: The same all the time   Pain/symptoms exacerbated by A. Sitting;B. Walking;C. Lifting;D. Carrying;E. Rest;G. Certain positions;J. ADL;K. Home tasks;L. Work tasks   Pain/symptoms eased by A. Sitting;C. Rest;H. Cold;K. Other   Pain " eased by comment oxy   Progression of symptoms since onset: Improved   Prior Level of Function   Prior Level of Function-Mobility fww, SPC   Prior Level of Function-ADLs independent   Current Level of Function   Current Community Support Family/friend caregiver   Patient role/employment history F. Retired   Living environment House/townhome   Home/community accessibility no stairs within home. 3 TRISHA with railing on R with ascending   Current equipment-Gait/Locomotion Front wheeled walker   Fall Risk Screen   Fall screen completed by PT   Have you fallen 2 or more times in the past year? No   Have you fallen and had an injury in the past year? Yes   Is patient a fall risk? Yes;Department fall risk interventions implemented   Fall screen comments Pt reports fall just over a year ago, was dizzy following transplant and fell at home. Injured knee and head.    Abuse Screen (yes response referral indicated)   Feels Unsafe at Home or Work/School no   Feels Threatened by Someone no   Does Anyone Try to Keep You From Having Contact with Others or Doing Things Outside Your Home? no   Physical Signs of Abuse Present no   Functional Scales   Functional Scales Other   Other Scales  LEFS: 10/80   Knee Objective Findings   Side (if bilateral, select both right and left) Right   Integumentary  noteable reddness and warmth along anterior aspect of R lower leg and around R knee. bandage still in place. swelling noted. Pt reports she has been in contact with MD care team regarding possible infection in incision--pt will be calling to update care team following PT eval. neg Homans sign. Had clinic RA assess further for possible DVT. Pt going to urgent care following therapy eval.    Posture rounded head, forward shoulders   Gait/Locomotion pt amb with short stride length, decreased stance time on R, use of fww   Balance/Proprioception (Single Leg Stance) unable   Foot Position In Standing slight hip ER   Knee Special Test Comments  special tests NT today per MD diagnosis   Palpation tenderness with min palpation around entire R knee (soft tissues and bony prominances)   Accessory Motion/Joint Mobility hypomobility of R tibiofemoral and patellofemoral jts   Right Knee Extension AROM lacking 10* (pain)   Right Knee Extension PROM lacking 10* (pain)   Right Knee Flexion AROM 68* (pain)   Right Knee Flexion PROM unable to assess due to pain   Right Knee Flexion Strength 3/5 (pain)   Right Knee Extension Strength 3/5 (pain)   Right Hip Abduction Strength seated: 4/5 (pain)   Right Quad Set Strength poor   R VMO Strength poor   Right Gastrocnemius Flexibility limited   Right Hamstring Flexibility limited   Right Hip Flexor Flexibility limietd   Right Quadricep Flexibility limited   Planned Therapy Interventions   Planned Therapy Interventions ADL retraining;balance training;bed mobility training;gait training;joint mobilization;manual therapy;motor coordination training;neuromuscular re-education;ROM;strengthening;stretching;transfer training   Planned Modality Interventions   Planned Modality Interventions Cryotherapy;Electrical stimulation;Hot packs;TENS;Ultrasound   Planned Modality Interventions Comments only as needed   Clinical Impression   Criteria for Skilled Therapeutic Interventions Met yes, treatment indicated   PT Diagnosis R knee pain s/p TKA   Influenced by the following impairments decreased ROM, decreased strength, impaired gait, impaired transfers, impaired bed mobility, pain   Functional limitations due to impairments walking, standing, transfers, sitting, bed mobility   Clinical Presentation Stable/Uncomplicated   Clinical Presentation Rationale ROM, strength, LEFS, stable comorbidities, clinical judgement   Clinical Decision Making (Complexity) Low complexity   Therapy Frequency 2 times/Week   Predicted Duration of Therapy Intervention (days/wks) 12 weeks   Risk & Benefits of therapy have been explained Yes   Patient, Family &  other staff in agreement with plan of care Yes   Clinical Impression Comments Pt is a 60 y.o. female who presented to the PT clinic today with a rehab diagnosis of R knee pain s/p TKA as evidenced by decreased ROM, decreased strength, impaired gait, impaired transfers, impaired bed mobility, and pain. Pt is appropriate for skilled PT to address previously listed impairments in order to decrease difficulty with standing, walking and squatting.    Education Assessment   Preferred Learning Style Listening;Reading;Demonstration;Pictures/video   Barriers to Learning No barriers   ORTHO GOALS   PT Ortho Eval Goals 1;2;3;4;5   Ortho Goal 1   Goal Identifier 1   Goal Description Pt will be able to stand for 15 minutes without increase in sx in order to decrease difficulty with ADLs.    Target Date 11/25/20   Ortho Goal 2   Goal Identifier 2   Goal Description Pt will be able to reach 0* knee extension in order to decrease difficulty with walking without AD.    Target Date 12/09/20   Ortho Goal 3   Goal Identifier 3   Goal Description Pt will be able to flex R knee 110* in order to decrease difficulty with stairs and home navigation.    Target Date 12/23/20   Ortho Goal 4   Goal Identifier 4   Goal Description Pt will be independent with HEP in order to self manage symptoms.    Target Date 01/22/21   Ortho Goal 5   Goal Identifier 5   Goal Description Pt will be able to perform mini squat and demonstrate 5/5 R knee strength in order to decrease difficulty with ADLs.    Target Date 01/22/20   Total Evaluation Time   PT Eval, Low Complexity Minutes (78519) 25       Please contact me with any questions or concerns.    Thank you for your referral,     Martita Milligan, PT, DPT  Physical Therapist  [address] [address] [phone]

## 2020-10-28 NOTE — PROGRESS NOTES
Patient states that her incision is pink, swollen and warm to the touch    She is going in for a therapy appointment    She is going to call us after the appointment if they feel that there is something concerning.

## 2020-10-28 NOTE — PROGRESS NOTES
RN called and left a detailed VM to patient. Advise patient to do RICE. Emphasis on icing and elevation to reduce the swelling of the knee. RN provided call back number in case patient has other questions or concerns    Colten Avila RN

## 2020-10-28 NOTE — PATIENT INSTRUCTIONS
When you arrive at the ER please show them this note.    Please room patient upon arrival as she is immunocompromised transplant patient. I have spoken with Dr. Scott and Angelica in the ER. Thank you very much.

## 2020-10-29 ENCOUNTER — HOSPITAL ENCOUNTER (INPATIENT)
Facility: CLINIC | Age: 60
LOS: 2 days | Discharge: HOME | DRG: 603 | End: 2020-10-31
Attending: ORTHOPAEDIC SURGERY | Admitting: ORTHOPAEDIC SURGERY
Payer: COMMERCIAL

## 2020-10-29 ENCOUNTER — APPOINTMENT (OUTPATIENT)
Dept: PHYSICAL THERAPY | Facility: CLINIC | Age: 60
DRG: 603 | End: 2020-10-29
Attending: ORTHOPAEDIC SURGERY
Payer: COMMERCIAL

## 2020-10-29 DIAGNOSIS — M17.11 PRIMARY OSTEOARTHRITIS OF RIGHT KNEE: Primary | ICD-10-CM

## 2020-10-29 DIAGNOSIS — G89.29 CHRONIC PAIN OF BOTH KNEES: ICD-10-CM

## 2020-10-29 DIAGNOSIS — M25.562 CHRONIC PAIN OF BOTH KNEES: ICD-10-CM

## 2020-10-29 DIAGNOSIS — M25.561 CHRONIC PAIN OF BOTH KNEES: ICD-10-CM

## 2020-10-29 LAB
ALBUMIN SERPL-MCNC: 2.5 G/DL (ref 3.4–5)
ALP SERPL-CCNC: 107 U/L (ref 40–150)
ALT SERPL W P-5'-P-CCNC: 14 U/L (ref 0–50)
ANION GAP SERPL CALCULATED.3IONS-SCNC: 3 MMOL/L (ref 3–14)
AST SERPL W P-5'-P-CCNC: 20 U/L (ref 0–45)
BILIRUB SERPL-MCNC: 1.1 MG/DL (ref 0.2–1.3)
BUN SERPL-MCNC: 20 MG/DL (ref 7–30)
CALCIUM SERPL-MCNC: 8.7 MG/DL (ref 8.5–10.1)
CHLORIDE SERPL-SCNC: 104 MMOL/L (ref 94–109)
CO2 SERPL-SCNC: 32 MMOL/L (ref 20–32)
CREAT SERPL-MCNC: 1.22 MG/DL (ref 0.52–1.04)
GFR SERPL CREATININE-BSD FRML MDRD: 48 ML/MIN/{1.73_M2}
GLUCOSE SERPL-MCNC: 82 MG/DL (ref 70–99)
POTASSIUM SERPL-SCNC: 4.6 MMOL/L (ref 3.4–5.3)
PROT SERPL-MCNC: 6.2 G/DL (ref 6.8–8.8)
SODIUM SERPL-SCNC: 139 MMOL/L (ref 133–144)

## 2020-10-29 PROCEDURE — 250N000012 HC RX MED GY IP 250 OP 636 PS 637: Performed by: STUDENT IN AN ORGANIZED HEALTH CARE EDUCATION/TRAINING PROGRAM

## 2020-10-29 PROCEDURE — 99207 PR CONSULT E&M CHANGED TO SUBSEQUENT LEVEL: CPT | Performed by: INTERNAL MEDICINE

## 2020-10-29 PROCEDURE — 97116 GAIT TRAINING THERAPY: CPT | Mod: GP | Performed by: PHYSICAL THERAPIST

## 2020-10-29 PROCEDURE — 250N000013 HC RX MED GY IP 250 OP 250 PS 637: Performed by: STUDENT IN AN ORGANIZED HEALTH CARE EDUCATION/TRAINING PROGRAM

## 2020-10-29 PROCEDURE — 80053 COMPREHEN METABOLIC PANEL: CPT | Performed by: INTERNAL MEDICINE

## 2020-10-29 PROCEDURE — 250N000013 HC RX MED GY IP 250 OP 250 PS 637: Performed by: INTERNAL MEDICINE

## 2020-10-29 PROCEDURE — 97110 THERAPEUTIC EXERCISES: CPT | Mod: GP | Performed by: PHYSICAL THERAPIST

## 2020-10-29 PROCEDURE — 120N000002 HC R&B MED SURG/OB UMMC

## 2020-10-29 PROCEDURE — 258N000003 HC RX IP 258 OP 636: Performed by: STUDENT IN AN ORGANIZED HEALTH CARE EDUCATION/TRAINING PROGRAM

## 2020-10-29 PROCEDURE — 99232 SBSQ HOSP IP/OBS MODERATE 35: CPT | Performed by: INTERNAL MEDICINE

## 2020-10-29 PROCEDURE — 250N000011 HC RX IP 250 OP 636: Performed by: STUDENT IN AN ORGANIZED HEALTH CARE EDUCATION/TRAINING PROGRAM

## 2020-10-29 PROCEDURE — 36415 COLL VENOUS BLD VENIPUNCTURE: CPT | Performed by: INTERNAL MEDICINE

## 2020-10-29 PROCEDURE — 97530 THERAPEUTIC ACTIVITIES: CPT | Mod: GP | Performed by: PHYSICAL THERAPIST

## 2020-10-29 PROCEDURE — 97161 PT EVAL LOW COMPLEX 20 MIN: CPT | Mod: GP | Performed by: PHYSICAL THERAPIST

## 2020-10-29 RX ORDER — LEVOTHYROXINE SODIUM 125 UG/1
125 TABLET ORAL DAILY
Status: DISCONTINUED | OUTPATIENT
Start: 2020-10-29 | End: 2020-10-31 | Stop reason: HOSPADM

## 2020-10-29 RX ORDER — CYCLOSPORINE 25 MG/1
75 CAPSULE, LIQUID FILLED ORAL 2 TIMES DAILY
Status: DISCONTINUED | OUTPATIENT
Start: 2020-10-29 | End: 2020-10-31 | Stop reason: HOSPADM

## 2020-10-29 RX ORDER — NALOXONE HYDROCHLORIDE 0.4 MG/ML
.1-.4 INJECTION, SOLUTION INTRAMUSCULAR; INTRAVENOUS; SUBCUTANEOUS
Status: DISCONTINUED | OUTPATIENT
Start: 2020-10-29 | End: 2020-10-31 | Stop reason: HOSPADM

## 2020-10-29 RX ORDER — SODIUM CHLORIDE 9 MG/ML
INJECTION, SOLUTION INTRAVENOUS
Status: DISPENSED
Start: 2020-10-29 | End: 2020-10-29

## 2020-10-29 RX ORDER — OXYCODONE HYDROCHLORIDE 5 MG/1
5-10 TABLET ORAL
Status: DISCONTINUED | OUTPATIENT
Start: 2020-10-29 | End: 2020-10-31 | Stop reason: HOSPADM

## 2020-10-29 RX ORDER — FAMOTIDINE 20 MG/1
20 TABLET, FILM COATED ORAL 2 TIMES DAILY
Status: DISCONTINUED | OUTPATIENT
Start: 2020-10-29 | End: 2020-10-31 | Stop reason: HOSPADM

## 2020-10-29 RX ORDER — SODIUM CHLORIDE 9 MG/ML
1000 INJECTION, SOLUTION INTRAVENOUS CONTINUOUS
Status: DISCONTINUED | OUTPATIENT
Start: 2020-10-29 | End: 2020-10-31 | Stop reason: HOSPADM

## 2020-10-29 RX ORDER — PROCHLORPERAZINE 25 MG
25 SUPPOSITORY, RECTAL RECTAL EVERY 12 HOURS PRN
Status: DISCONTINUED | OUTPATIENT
Start: 2020-10-29 | End: 2020-10-31 | Stop reason: HOSPADM

## 2020-10-29 RX ORDER — AMOXICILLIN 250 MG
1-2 CAPSULE ORAL 2 TIMES DAILY
Status: CANCELLED | OUTPATIENT
Start: 2020-10-29

## 2020-10-29 RX ORDER — BISACODYL 10 MG
10 SUPPOSITORY, RECTAL RECTAL DAILY PRN
Status: DISCONTINUED | OUTPATIENT
Start: 2020-10-29 | End: 2020-10-31 | Stop reason: HOSPADM

## 2020-10-29 RX ORDER — POLYETHYLENE GLYCOL 3350 17 G/17G
17 POWDER, FOR SOLUTION ORAL DAILY PRN
Status: CANCELLED | OUTPATIENT
Start: 2020-10-29

## 2020-10-29 RX ORDER — PROCHLORPERAZINE MALEATE 10 MG
10 TABLET ORAL EVERY 6 HOURS PRN
Status: DISCONTINUED | OUTPATIENT
Start: 2020-10-29 | End: 2020-10-31 | Stop reason: HOSPADM

## 2020-10-29 RX ORDER — LEVOTHYROXINE SODIUM 125 UG/1
125 TABLET ORAL DAILY
Status: CANCELLED | OUTPATIENT
Start: 2020-10-29

## 2020-10-29 RX ORDER — CYCLOBENZAPRINE HCL 10 MG
10 TABLET ORAL 3 TIMES DAILY PRN
Qty: 30 TABLET | Refills: 0 | Status: SHIPPED | OUTPATIENT
Start: 2020-10-29 | End: 2020-11-20

## 2020-10-29 RX ORDER — BISACODYL 10 MG
10 SUPPOSITORY, RECTAL RECTAL DAILY PRN
Status: CANCELLED | OUTPATIENT
Start: 2020-10-29

## 2020-10-29 RX ORDER — CYCLOSPORINE 25 MG/1
75 CAPSULE, LIQUID FILLED ORAL 2 TIMES DAILY
Status: CANCELLED | OUTPATIENT
Start: 2020-10-29

## 2020-10-29 RX ORDER — CHOLECALCIFEROL (VITAMIN D3) 25 MCG
1000 CAPSULE ORAL 2 TIMES DAILY
Status: CANCELLED | OUTPATIENT
Start: 2020-10-29

## 2020-10-29 RX ORDER — ONDANSETRON 4 MG/1
4 TABLET, ORALLY DISINTEGRATING ORAL EVERY 6 HOURS PRN
Status: DISCONTINUED | OUTPATIENT
Start: 2020-10-29 | End: 2020-10-31 | Stop reason: HOSPADM

## 2020-10-29 RX ORDER — ACETAMINOPHEN 500 MG
500 TABLET ORAL EVERY 6 HOURS PRN
Status: DISCONTINUED | OUTPATIENT
Start: 2020-10-29 | End: 2020-10-31 | Stop reason: HOSPADM

## 2020-10-29 RX ORDER — ACETAMINOPHEN 325 MG/1
975 TABLET ORAL 3 TIMES DAILY
Status: CANCELLED | OUTPATIENT
Start: 2020-10-29

## 2020-10-29 RX ORDER — FAMOTIDINE 20 MG/1
20 TABLET, FILM COATED ORAL 2 TIMES DAILY
Status: CANCELLED | OUTPATIENT
Start: 2020-10-29

## 2020-10-29 RX ORDER — CYCLOBENZAPRINE HCL 10 MG
10 TABLET ORAL 3 TIMES DAILY PRN
Status: DISCONTINUED | OUTPATIENT
Start: 2020-10-29 | End: 2020-10-31 | Stop reason: HOSPADM

## 2020-10-29 RX ORDER — LIDOCAINE 40 MG/G
CREAM TOPICAL
Status: DISCONTINUED | OUTPATIENT
Start: 2020-10-29 | End: 2020-10-31 | Stop reason: HOSPADM

## 2020-10-29 RX ORDER — SODIUM CHLORIDE 9 MG/ML
1000 INJECTION, SOLUTION INTRAVENOUS CONTINUOUS
Status: CANCELLED | OUTPATIENT
Start: 2020-10-29

## 2020-10-29 RX ORDER — OXYCODONE HYDROCHLORIDE 5 MG/1
5-10 TABLET ORAL EVERY 4 HOURS PRN
Status: CANCELLED | OUTPATIENT
Start: 2020-10-29

## 2020-10-29 RX ORDER — LIDOCAINE 40 MG/G
CREAM TOPICAL
Status: CANCELLED | OUTPATIENT
Start: 2020-10-29

## 2020-10-29 RX ORDER — AMOXICILLIN 250 MG
1-2 CAPSULE ORAL 2 TIMES DAILY
Qty: 30 TABLET | Refills: 0 | Status: SHIPPED | OUTPATIENT
Start: 2020-10-29 | End: 2020-11-20

## 2020-10-29 RX ORDER — POLYETHYLENE GLYCOL 3350 17 G/17G
17 POWDER, FOR SOLUTION ORAL DAILY
Status: DISCONTINUED | OUTPATIENT
Start: 2020-10-29 | End: 2020-10-31 | Stop reason: HOSPADM

## 2020-10-29 RX ORDER — ACETAMINOPHEN 325 MG/1
650 TABLET ORAL EVERY 4 HOURS
Qty: 100 TABLET | Refills: 0 | Status: SHIPPED | OUTPATIENT
Start: 2020-10-29 | End: 2021-07-13

## 2020-10-29 RX ORDER — ACETAMINOPHEN 325 MG/1
650 TABLET ORAL EVERY 4 HOURS PRN
Status: DISCONTINUED | OUTPATIENT
Start: 2020-10-29 | End: 2020-10-29

## 2020-10-29 RX ORDER — ONDANSETRON 2 MG/ML
4 INJECTION INTRAMUSCULAR; INTRAVENOUS EVERY 6 HOURS PRN
Status: DISCONTINUED | OUTPATIENT
Start: 2020-10-29 | End: 2020-10-31 | Stop reason: HOSPADM

## 2020-10-29 RX ORDER — POLYETHYLENE GLYCOL 3350 17 G/17G
17 POWDER, FOR SOLUTION ORAL DAILY
Status: CANCELLED | OUTPATIENT
Start: 2020-10-29

## 2020-10-29 RX ORDER — AMOXICILLIN 250 MG
1-2 CAPSULE ORAL 2 TIMES DAILY
Status: DISCONTINUED | OUTPATIENT
Start: 2020-10-29 | End: 2020-10-31 | Stop reason: HOSPADM

## 2020-10-29 RX ORDER — ACETAMINOPHEN 325 MG/1
650 TABLET ORAL EVERY 4 HOURS PRN
Status: CANCELLED | OUTPATIENT
Start: 2020-10-29

## 2020-10-29 RX ADMIN — SODIUM CHLORIDE 1000 ML: 9 INJECTION, SOLUTION INTRAVENOUS at 01:56

## 2020-10-29 RX ADMIN — ACETAMINOPHEN 650 MG: 325 TABLET, FILM COATED ORAL at 02:23

## 2020-10-29 RX ADMIN — OXYCODONE HYDROCHLORIDE 10 MG: 5 TABLET ORAL at 01:55

## 2020-10-29 RX ADMIN — ACETAMINOPHEN 500 MG: 500 TABLET, FILM COATED ORAL at 18:19

## 2020-10-29 RX ADMIN — CYCLOSPORINE 75 MG: 25 CAPSULE, LIQUID FILLED ORAL at 09:02

## 2020-10-29 RX ADMIN — DOCUSATE SODIUM AND SENNOSIDES 2 TABLET: 8.6; 5 TABLET, FILM COATED ORAL at 20:12

## 2020-10-29 RX ADMIN — OXYCODONE HYDROCHLORIDE 5 MG: 5 TABLET ORAL at 19:07

## 2020-10-29 RX ADMIN — OXYCODONE HYDROCHLORIDE 5 MG: 5 TABLET ORAL at 16:07

## 2020-10-29 RX ADMIN — SODIUM CHLORIDE 1000 ML: 9 INJECTION, SOLUTION INTRAVENOUS at 16:12

## 2020-10-29 RX ADMIN — CYCLOSPORINE 75 MG: 25 CAPSULE, LIQUID FILLED ORAL at 21:16

## 2020-10-29 RX ADMIN — FAMOTIDINE 20 MG: 20 TABLET ORAL at 09:02

## 2020-10-29 RX ADMIN — FAMOTIDINE 20 MG: 20 TABLET ORAL at 20:13

## 2020-10-29 RX ADMIN — VANCOMYCIN HYDROCHLORIDE 1500 MG: 10 INJECTION, POWDER, LYOPHILIZED, FOR SOLUTION INTRAVENOUS at 16:08

## 2020-10-29 RX ADMIN — OXYCODONE HYDROCHLORIDE 5 MG: 5 TABLET ORAL at 09:16

## 2020-10-29 RX ADMIN — OXYCODONE HYDROCHLORIDE 5 MG: 5 TABLET ORAL at 13:06

## 2020-10-29 RX ADMIN — VANCOMYCIN HYDROCHLORIDE 1500 MG: 10 INJECTION, POWDER, LYOPHILIZED, FOR SOLUTION INTRAVENOUS at 04:43

## 2020-10-29 RX ADMIN — POLYETHYLENE GLYCOL 3350 17 G: 17 POWDER, FOR SOLUTION ORAL at 09:02

## 2020-10-29 RX ADMIN — LEVOTHYROXINE SODIUM 125 MCG: 125 TABLET ORAL at 09:02

## 2020-10-29 RX ADMIN — DOCUSATE SODIUM AND SENNOSIDES 2 TABLET: 8.6; 5 TABLET, FILM COATED ORAL at 09:02

## 2020-10-29 NOTE — PHARMACY-ADMISSION MEDICATION HISTORY
Admission medication history for the October 29, 2020 admission is complete.     Interview sources:  Patient    Reliability of source: Good    Medication compliance: Good    Changes made to PTA medication list (reason)  Added: None  Deleted: None  Changed: None    Additional medication history information:   Stopped taking her Vitamin D more than a week ago because of the upcoming surgery per surgery team recommendations.     Prior to Admission medications    Medication Sig Last Dose Taking? Auth Provider   acetaminophen (TYLENOL) 325 MG tablet Take 2 tablets (650 mg) by mouth every 4 hours  Yes Beatriz Pearce APRN CNS   aspirin (ASA) 81 MG EC tablet Take 2 tablets (162 mg) by mouth 2 times daily Past Week at Unknown time Yes Camilo Trujillo MD   Cholecalciferol (VITAMIN D-3) 25 MCG (1000 UT) CAPS Take 1,000 Units by mouth 2 times daily Past Week at Unknown time Yes Mandeep Alford MD   cyclobenzaprine (FLEXERIL) 10 MG tablet Take 1 tablet (10 mg) by mouth 3 times daily as needed for muscle spasms or other (pain)  Yes Beatriz Pearce APRN CNS   cycloSPORINE modified (GENERIC EQUIVALENT) 25 MG capsule Take 3 capsules (75 mg) by mouth 2 times daily 10/28/2020 at 2000 Yes Leventhal, Thomas Michael, MD   famotidine (PEPCID) 20 MG tablet Take 1 tablet (20 mg) by mouth 2 times daily 10/28/2020 at 2000 Yes Mandeep Alford MD   levothyroxine (SYNTHROID/LEVOTHROID) 125 MCG tablet Take 125 mcg by mouth daily  10/28/2020 at 0800 Yes Abstract, Provider   oxyCODONE (ROXICODONE) 5 MG tablet Take 1-2 tablets (5-10 mg) by mouth every 3 hours as needed for pain (Moderate to Severe) 10/28/2020 at Unknown time Yes Shaun Cardenas MD   senna-docusate (SENOKOT-S/PERICOLACE) 8.6-50 MG tablet Take 1-2 tablets by mouth 2 times daily  Yes Beatriz Pearce APRN CNS   polyethylene glycol (MIRALAX) 17 g packet Take 17 g by mouth daily 10/27/2020  Shaun Cardenas MD       Time spent: 15  minutes    Medication history completed by:     Linnea Bennett, PharmD, BCPS

## 2020-10-29 NOTE — CONSULTS
[facility]   Consult Note - Hospitalist Service     Date of Admission:  10/29/2020  Consult Requested by: Dr. Mccracken  Reason for Consult: Medical comanagement    Assessment & Plan   Nicci Pemberton is a 60 year old female with history of liver transplant 2019 and recent right TKA admitted on 10/29/2020 for right lower extremity cellulitis.    Right lower extremity cellulitis, right leg swelling, s/p right TKA: No evidence of DVT. Ortho consulted. Received cefazolin in ED. Antibiotics per ortho    History of liver transplant: Liver enzymes are stable.   Continue prior cyclosporine 75 mg twice daily  Limit acetaminophen to 2g daily     Hypothyroidism:   Continue prior levothyroxine 125 mcg daily     Gastroesophageal reflux disease:  Continue prior famotidine 20 mg twice daily     CKD: Monitor clinically. Recent creatinine ranges between 1.1-1.2.    The patient's care was discussed with the Bedside Nurse, Patient and orthopaedics Consultant.    Nghia Solis MD  [facility]     ______________________________________________________________________    Chief Complaint   Leg redness    History is obtained from the patient    History of Present Illness   Nicci Pemberton is a 60 year old female who presents with redness and swelling in her right leg. Had right TKA 10/23. 2 days ago started noticing redness and warmth in right lower leg. No trauma. No fever/chills. Day of admission, she noticed increased swelling. Now leg is red, warm, and very swollen. Painful to walk but was able to do PT the day of admission. Has been taking all of her medications as usual.    Review of Systems   The 10 point Review of Systems is negative other than noted in the HPI    Past Medical History    I have reviewed this patient's medical history and updated it with pertinent information if needed.   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  Past Surgical History:   Procedure Laterality Date     ARTHROPLASTY KNEE Right 10/23/2020    Procedure: Right  total knee arthroplasty;  Surgeon: Mario Wallace MD;  Location: [facility] OR     BENCH LIVER N/A 8/18/2019    Procedure: BACKBENCH PREPARATION, LIVER;  Surgeon: Mandeep Alford MD;  Location: [facility] OR     COLONOSCOPY N/A 6/22/2018    Procedure: COLONOSCOPY;  colonoscopy;  Surgeon: Hugo Patel MD;  Location: [facility]     ENDOSCOPIC RETROGRADE CHOLANGIOPANCREATOGRAM N/A 2/10/2020    Procedure: ENDOSCOPIC RETROGRADE CHOLANGIOPANCREATOGRAPHY with spinchterotomy;  Surgeon: Guru Rigoberto Canada MD;  Location: [facility] OR     ENDOSCOPIC RETROGRADE CHOLANGIOPANCREATOGRAM N/A 5/13/2020    Procedure: ENDOSCOPIC RETROGRADE CHOLANGIOPANCREATOGRAPHY,Stent exchange and sludge removal;  Surgeon: Guru Rigoberto Canada MD;  Location: [facility] OR     ENDOSCOPIC RETROGRADE CHOLANGIOPANCREATOGRAPHY, EXCHANGE TUBE/STENT N/A 3/4/2020    Procedure: ENDOSCOPIC RETROGRADE CHOLANGIOPANCREATOGRAPHY, WITH biliary dilarion, stent exchange, stone removal;  Surgeon: Guru Rigoberto Canada MD;  Location: [facility] OR     ESOPHAGOSCOPY, GASTROSCOPY, DUODENOSCOPY (EGD), COMBINED N/A 10/31/2019    Procedure: Esophagogastroduodenoscopy, With Biopsy;  Surgeon: Nerissa Aranda MD;  Location: [facility] GI     IR FEEDING TUBE PLACEMENT W MOHAN/MD  9/6/2019     IR FLUORO 0-1 HOUR  9/5/2019     IR LUMBAR  PUNCTURE  2019     TRANSPLANT LIVER RECIPIENT  DONOR N/A 2019    Procedure: TRANSPLANT, LIVER, RECIPIENT,  DONOR;  Surgeon: Mandeep Alford MD;  Location:  OR       Social History   I have reviewed this patient's social history and updated it with pertinent information if needed.  Social History     Tobacco Use     Smoking status: Former Smoker     Packs/day: 0.00     Quit date:      Years since quittin.8     Smokeless tobacco: Never Used   Substance Use Topics     Alcohol use: No     Drug use: No       Family History   I have reviewed this patient's family history and updated it with pertinent information if needed.   Family History   Problem Relation Age of Onset     Cirrhosis No family hx of      Liver Cancer No family hx of      Medications   Medications Prior to Admission   Medication Sig Dispense Refill Last Dose     acetaminophen (TYLENOL) 325 MG tablet Take 2 tablets (650 mg) by mouth every 4 hours 100 tablet 0      aspirin (ASA) 81 MG EC tablet Take 2 tablets (162 mg) by mouth 2 times daily 60 tablet 0      Cholecalciferol (VITAMIN D-3) 25 MCG (1000 UT) CAPS Take 1,000 Units by mouth 2 times daily (Patient not taking: Reported on 10/28/2020) 60 capsule 1      cycloSPORINE modified (GENERIC EQUIVALENT) 25 MG capsule Take 3 capsules (75 mg) by mouth 2 times daily 540 capsule 3      famotidine (PEPCID) 20 MG tablet Take 1 tablet (20 mg) by mouth 2 times daily 60 tablet 3      levothyroxine (SYNTHROID/LEVOTHROID) 125 MCG tablet Take 125 mcg by mouth daily         oxyCODONE (ROXICODONE) 5 MG tablet Take 1-2 tablets (5-10 mg) by mouth every 3 hours as needed for pain (Moderate to Severe) 30 tablet 0      polyethylene glycol (MIRALAX) 17 g packet Take 17 g by mouth daily 7 packet 0      senna-docusate (SENOKOT-S/PERICOLACE) 8.6-50 MG tablet Take 1-2 tablets by mouth 2 times daily Take while on oral narcotics to prevent or treat constipation. 30 tablet 0        Allergies    Allergies   Allergen Reactions     Ciprofloxacin Dizziness and Nausea     Food      Fruits with cores and pits  Apples     Sulfa Drugs Unknown     Swollen joints     Furosemide Rash       Physical Exam   Vital Signs: Temp: 98.3  F (36.8  C) Temp src: Oral BP: 132/64 Pulse: 77   Resp: 16 SpO2: 100 % O2 Device: None (Room air)    Weight: 0 lbs 0 oz    Constitutional: awake, alert, cooperative, no apparent distress, and appears stated age  Eyes: Lids and lashes normal, pupils equal, round and reactive to light, extra ocular muscles intact, sclera clear, conjunctiva normal  Hematologic / Lymphatic: no cervical lymphadenopathy and no supraclavicular lymphadenopathy  Respiratory: No increased work of breathing, good air exchange, clear to auscultation bilaterally, no crackles or wheezing  Cardiovascular: normal apical pulses , regular rate and rhythm, normal S1 and S2 and murmurs include systolic murmur II/VI located at right upper sternal border without radiation  GI: Nondistended, nontender, normal bowel sounds  Skin: Right leg with warmth, erythema, and swelling to level of shin. No discharge from surgical incision on right patella.  Musculoskeletal: Normal strength and sensation in bilateral feet    Data   Results for orders placed or performed during the hospital encounter of 10/28/20 (from the past 24 hour(s))   Lactic acid whole blood   Result Value Ref Range    Lactic Acid 0.8 0.7 - 2.0 mmol/L   Erythrocyte sedimentation rate auto   Result Value Ref Range    Sed Rate 100 (H) 0 - 30 mm/h   CRP inflammation   Result Value Ref Range    CRP Inflammation 108.0 (H) 0.0 - 8.0 mg/L   Comprehensive metabolic panel   Result Value Ref Range    Sodium 135 133 - 144 mmol/L    Potassium 4.0 3.4 - 5.3 mmol/L    Chloride 100 94 - 109 mmol/L    Carbon Dioxide 32 20 - 32 mmol/L    Anion Gap 3 3 - 14 mmol/L    Glucose 94 70 - 99 mg/dL    Urea Nitrogen 22 7 - 30 mg/dL    Creatinine 1.10 (H) 0.52 - 1.04 mg/dL    GFR Estimate 54 (L)  >60 mL/min/[1.73_m2]    GFR Estimate If Black 63 >60 mL/min/[1.73_m2]    Calcium 9.4 8.5 - 10.1 mg/dL    Bilirubin Total 1.6 (H) 0.2 - 1.3 mg/dL    Albumin 3.1 (L) 3.4 - 5.0 g/dL    Protein Total 7.5 6.8 - 8.8 g/dL    Alkaline Phosphatase 137 40 - 150 U/L    ALT 18 0 - 50 U/L    AST 28 0 - 45 U/L   CBC with platelets, differential   Result Value Ref Range    WBC 6.0 4.0 - 11.0 10e9/L    RBC Count 2.65 (L) 3.8 - 5.2 10e12/L    Hemoglobin 8.9 (L) 11.7 - 15.7 g/dL    Hematocrit 26.2 (L) 35.0 - 47.0 %    MCV 99 78 - 100 fl    MCH 33.6 (H) 26.5 - 33.0 pg    MCHC 34.0 31.5 - 36.5 g/dL    RDW 11.9 10.0 - 15.0 %    Platelet Count 160 150 - 450 10e9/L    Diff Method Automated Method     % Neutrophils 68.1 %    % Lymphocytes 12.5 %    % Monocytes 13.5 %    % Eosinophils 5.0 %    % Basophils 0.2 %    % Immature Granulocytes 0.7 %    Nucleated RBCs 0 0 /100    Absolute Neutrophil 4.1 1.6 - 8.3 10e9/L    Absolute Lymphocytes 0.8 0.8 - 5.3 10e9/L    Absolute Monocytes 0.8 0.0 - 1.3 10e9/L    Absolute Eosinophils 0.3 0.0 - 0.7 10e9/L    Absolute Basophils 0.0 0.0 - 0.2 10e9/L    Abs Immature Granulocytes 0.0 0 - 0.4 10e9/L    Absolute Nucleated RBC 0.0    US Lower Extremity Venous Duplex Right    Narrative    US LOWER EXTREMITY VENOUS DUPLEX RIGHT   10/28/2020 5:31 PM     HISTORY: Status post knee surgery with swelling and erythema to the  right lower extremity.    COMPARISON: None available.    FINDINGS: Gray-scale, color and Doppler spectral analysis ultrasound  was performed of the right lower extremity. Compression and  augmentation imaging was performed.    No evidence of deep venous thrombosis in the right lower extremity.       Impression    IMPRESSION: No evidence of deep venous thrombosis in the right lower  extremity.     MONA MCBRIDE MD

## 2020-10-29 NOTE — PROGRESS NOTES
Patient seen post midnight by Hospital medicine    Reports doing well  Dressing in place.   On Vancomycin. Continue  Creatinine 1.22 on 10.29,. Avoid Nephrotoxins, renal dose medications, strict I and O    Medicine will continue to follow.       Josué Jimenez  Internal Medicine  Hospitalist  Pager no: [phone]

## 2020-10-29 NOTE — PROGRESS NOTES
10/29/20 1350   Quick Adds   Type of Visit Initial PT Evaluation       Present no   Language English   Living Environment   People in home spouse   Current Living Arrangements house   Home Accessibility stairs to enter home   Number of Stairs, Main Entrance 2   Stair Railings, Main Entrance none   Number of Stairs, Within Home, Primary 6   Stair Railings, Within Home, Primary railings on both sides of stairs   Transportation Anticipated family or friend will provide   Self-Care   Usual Activity Tolerance moderate   Current Activity Tolerance moderate   Regular Exercise Other (see comments)  (PT/TKA exercises)   Equipment Currently Used at Home walker, rolling   Disability/Function   Hearing Difficulty or Deaf no   Wear Glasses or Blind no   Concentrating, Remembering or Making Decisions Difficulty no   Difficulty Communicating no   Difficulty Eating/Swallowing no   Walking or Climbing Stairs Difficulty yes   Walking or Climbing Stairs ambulation difficulty, requires equipment   Mobility Management Uses walker   Toileting no   Doing Errands Independently Difficulty (such as shopping) no   Fall history within last six months no   Number of times patient has fallen within last six months 0   Change in Functional Status Since Onset of Current Illness/Injury no   General Information   Onset of Illness/Injury or Date of Surgery 10/28/20   Referring Physician Mario Wallace MD   Patient/Family Therapy Goals Statement (PT) Goal not addressed    Pertinent History of Current Problem (include personal factors and/or comorbidities that impact the POC) Pt is a 59 y/o female admitted with right knee cellulitis, see chart for PMH.    Existing Precautions/Restrictions other (see comments)  (no pillow under the knee)   Weight-Bearing Status - LUE full weight-bearing   Weight-Bearing Status - RUE full weight-bearing   Weight-Bearing Status - LLE full weight-bearing   Weight-Bearing Status - RLE weight-bearing  as tolerated   Heart Disease Risk Factors Overweight   General Observations Pt supine in bed at start of PT session currently getting IV fluids.     Cognition   Orientation Status (Cognition) other (see comments)  (Not tested)   Affect/Mental Status (Cognition) WNL   Follows Commands (Cognition) WNL   Pain Assessment   Patient Currently in Pain Yes, see Vital Sign flowsheet   Integumentary/Edema   Integumentary/Edema other (describe)   Integumentary/Edema Comments Right LE not observed secondary to wrap in place.  Pt has edema bilateral LEs, reported having chronic lymphedema at baseline.  Pt reported not being able to wear compression stocking due to knee pain.    Posture    Posture Forward head position;Protracted shoulders   Range of Motion (ROM)   ROM Comment Pt demonstrated decreased right LE ROM secondary to pain.  Pt demonstrated funtional left LE ROM during bed mobility, transfers, and gait.    Strength   Strength Comments LE strength not formally tested secondary to pain.  Pt demonstrated functional LE strength during bed mobility, transfers, and gait.  Pt is able to complete SLR without assistance.     Bed Mobility   Comment (Bed Mobility) Supine to/from sitting with HOB elevated and mod I.  Pt is able to scoot/reposition in bed independently.     Transfers   Transfer Safety Comments Sit to/from standing from EOB and toilet with FWW and SBA to mod I   Gait/Stairs (Locomotion)   [place] Level (Gait) supervision;verbal cues   Assistive Device (Gait) walker, front-wheeled   Distance in Feet (Required for LE Total Joints) 250'   Pattern (Gait) swing-through   Deviations/Abnormal Patterns (Gait) gait speed decreased;weight shifting decreased;stride length decreased   Balance   Balance Comments Pt demonstrated good sitting balane and fair standing balance with FWW.    Sensory Examination   Sensory Perception Comments Pt has no c/o numbness/tingling along bilateral LEs.    Clinical Impression   Criteria for  "Skilled Therapeutic Intervention yes, treatment indicated   PT Diagnosis (PT) Decreased strength, decreased ROM, and impaired gait.    Influenced by the following impairments Pain, decreased strength, and s/p right LE cellulitis   Functional limitations due to impairments Impaired bed mobility, transfers, and gait.     Clinical Presentation Stable/Uncomplicated   Clinical Presentation Rationale Pt is a 60' y/o female admitted with right LE cellulits.  Pt using a FWW prior to admission but does not have any PMH that will impact PT POC.    Clinical Decision Making (Complexity) low complexity   Therapy Frequency (PT) Daily   Predicted Duration of Therapy Intervention (days/wks) 3 days   Planned Therapy Interventions (PT) gait training;stair training;ROM (range of motion);strengthening   Anticipated Equipment Needs at Discharge (PT) other (see comments)  (No equipment needs)   Risk & Benefits of therapy have been explained evaluation/treatment results reviewed;care plan/treatment goals reviewed;risks/benefits reviewed;patient   PT Discharge Planning    PT Discharge Recommendation (DC Rec) home with outpatient physical therapy   PT Rationale for DC Rec Pt would benefit from resuming OP PT for LE strengthening/ROM, lymphedema management, and gait.    PT Brief overview of current status  Pt is mod I with bed mobility, SBA to mod I for transfers, and gait with supervision.    [facility] Link[employer] TM \"6 Clicks\"   2016, Trustees of [facility], under license to [employer].  All rights reserved.   6 Clicks Short Forms Basic Mobility Inpatient Short Form   [facility] AM-PAC  \"6 Clicks\" V.2 Basic Mobility Inpatient Short Form   1. Turning from your back to your side while in a flat bed without using bedrails? 4 - None   2. Moving from lying on your back to sitting on the side of a flat bed without using bedrails? 4 - None   3. Moving to and from a bed to a chair (including a wheelchair)? 4 - None   4. Standing " up from a chair using your arms (e.g., wheelchair, or bedside chair)? 4 - None   5. To walk in hospital room? 4 - None   6. Climbing 3-5 steps with a railing? 3 - A Little   Basic Mobility Raw Score (Score out of 24.Lower scores equate to lower levels of function) 23   Total Evaluation Time   Total Evaluation Time (Minutes) 9

## 2020-10-29 NOTE — H&P
Orthopaedic Surgery H&P    Nicci Pemberton MRN# [id number]   Age: 60 year old YOB: 1960     Date of Admission:  10/29/2020        Assessment and Plan:   Assessment:  61yo female who was recently discharged s/p right TKA 10/23/20 with Dr. Wallace, now presenting for RLE erythema.  Most consistent with cellulitis based on history and exam; low suspicion for PJ I.    Plan:  Ortho Primary; Medicine comanage  Activity: Up with assist.  Weight bearing status: WBAT  Antibiotics: Empiric Vanco, transition to p.o. antibiotics pending clinical improvement  Diet: okay for regular diet from ortho perspective.  DVT prophylaxis: Hold ASA for now  Bracing/Splinting: none.  Tubigrip stocking for compression  Elevation: Elevate RLE on pillows.  No pillows behind the knee.  No catching of the bed.  Wound Care: Keep incision clean and dry  Pain management: PO meds  Imaging: Complete  Labs: Complete  Consults: Medicine, PT, OT, social work  Disposition: TBD pending clinical course          History of Present Illness:   Patient was seen and examined by me. History, PMH, Meds, SH, complete ROS (10 organ systems) and PE reviewed with patient and prior medical records.      61yo female with PMH liver transplantation on cyclosporine, chronic lymphedema, and recent right total knee arthroplasty on 10/23/2020 with Dr. Wallace who presents with right leg erythema.  Recently underwent right total knee arthroplasty on 10/23/2020, hospital course was unremarkable, and patient was discharged on 10/26/2020.  Patient attended a physical therapy session on 10/28/2020 where erythema over the distal leg was noted and the patient was sent to the emergency department for further evaluation.  Given the patient is on immunosuppressants, she was transferred to the [facility] for further evaluation and management.      The patient states she has been ambulatory with a walker.  Denies fevers or chills, trauma, drainage from incision, changes in her  postoperative range of motion, or worsening knee pain since discharge from the hospital.         Past Medical History:     Past Medical History:   Diagnosis Date     Anemia      Cellulitis      Cirrhosis of liver (H) 6/18/2018     Heterozygous alpha 1-antitrypsin deficiency (H)      High hepatic iron concentration determined by biopsy of liver      Liver cirrhosis secondary to ANGULO (H)      Liver transplanted (H) 08/18/2019     Lymphedema      Osteoarthritis     knees     SBP (spontaneous bacterial peritonitis) (H) 8/2/2019 8/1/19 in [facility]             Past Surgical History:     Past Surgical History:   Procedure Laterality Date     ARTHROPLASTY KNEE Right 10/23/2020    Procedure: Right  total knee arthroplasty;  Surgeon: Mario Wallace MD;  Location: [facility] OR     BENCH LIVER N/A 8/18/2019    Procedure: BACKBENCH PREPARATION, LIVER;  Surgeon: Mandeep Alford MD;  Location: [facility] OR     COLONOSCOPY N/A 6/22/2018    Procedure: COLONOSCOPY;  colonoscopy;  Surgeon: Hugo Patel MD;  [facility]     ENDOSCOPIC RETROGRADE CHOLANGIOPANCREATOGRAM N/A 2/10/2020    Procedure: ENDOSCOPIC RETROGRADE CHOLANGIOPANCREATOGRAPHY with spinchterotomy;  Surgeon: Guru Rigoberto Canada MD;  Location: [facility] OR     ENDOSCOPIC RETROGRADE CHOLANGIOPANCREATOGRAM N/A 5/13/2020    Procedure: ENDOSCOPIC RETROGRADE CHOLANGIOPANCREATOGRAPHY,Stent exchange and sludge removal;  Surgeon: Guru Rigoberto Canada MD;  Location: [facility] OR     ENDOSCOPIC RETROGRADE CHOLANGIOPANCREATOGRAPHY, EXCHANGE TUBE/STENT N/A 3/4/2020    Procedure: ENDOSCOPIC RETROGRADE CHOLANGIOPANCREATOGRAPHY, WITH biliary dilarion, stent exchange, stone removal;  Surgeon: Guru Rigoberto Canada MD;  Location: [facility] OR     ESOPHAGOSCOPY, GASTROSCOPY, DUODENOSCOPY (EGD), COMBINED N/A 10/31/2019    Procedure: Esophagogastroduodenoscopy, With Biopsy;  Surgeon: Nerissa Aranda MD;  Location: [facility] GI     IR FEEDING TUBE PLACEMENT W  MOHAN/MD  2019     IR FLUORO 0-1 HOUR  2019     IR LUMBAR PUNCTURE  2019     TRANSPLANT LIVER RECIPIENT  DONOR N/A 2019    Procedure: TRANSPLANT, LIVER, RECIPIENT,  DONOR;  Surgeon: Mandeep Alford MD;  Location:  OR             Social History:     Social History     Socioeconomic History     Marital status:      Spouse name: Not on file     Number of children: Not on file     Years of education: Not on file     Highest education level: Not on file   Occupational History     Not on file   Social Needs     Financial resource strain: Not on file     Food insecurity     Worry: Not on file     Inability: Not on file     Transportation needs     Medical: Not on file     Non-medical: Not on file   Tobacco Use     Smoking status: Former Smoker     Packs/day: 0.00     Quit date:      Years since quittin.8     Smokeless tobacco: Never Used   Substance and Sexual Activity     Alcohol use: No     Drug use: No     Sexual activity: Not on file   Lifestyle     Physical activity     Days per week: Not on file     Minutes per session: Not on file     Stress: Not on file   Relationships     Social connections     Talks on phone: Not on file     Gets together: Not on file     Attends [employer] service: Not on file     Active member of club or organization: Not on file     Attends meetings of clubs or organizations: Not on file     Relationship status: Not on file     Intimate partner violence     Fear of current or ex partner: Not on file     Emotionally abused: Not on file     Physically abused: Not on file     Forced sexual activity: Not on file   Other Topics Concern     Parent/sibling w/ CABG, MI or angioplasty before 65F 55M? Not Asked   Social History Narrative     Not on file             Family History:     Family History   Problem Relation Age of Onset     Cirrhosis No family hx of      Liver Cancer No family hx of               Medications:     Current  Facility-Administered Medications   Medication     acetaminophen (TYLENOL) tablet 650 mg     bisacodyl (DULCOLAX) Suppository 10 mg     cyclobenzaprine (FLEXERIL) tablet 10 mg     cycloSPORINE modified (GENERIC EQUIVALENT) capsule 75 mg     famotidine (PEPCID) tablet 20 mg     levothyroxine (SYNTHROID/LEVOTHROID) tablet 125 mcg     lidocaine (LMX4) cream     lidocaine 1 % 1 mL     ondansetron (ZOFRAN-ODT) ODT tab 4 mg    Or     ondansetron (ZOFRAN) injection 4 mg     oxyCODONE (ROXICODONE) tablet 5-10 mg     polyethylene glycol (MIRALAX) Packet 17 g     prochlorperazine (COMPAZINE) injection 10 mg    Or     prochlorperazine (COMPAZINE) tablet 10 mg    Or     prochlorperazine (COMPAZINE) suppository 25 mg     senna-docusate (SENOKOT-S/PERICOLACE) 8.6-50 MG per tablet 1-2 tablet     sodium chloride (PF) 0.9% PF flush 3 mL     sodium chloride (PF) 0.9% PF flush 3 mL     sodium chloride 0.9% infusion     sodium phosphate (FLEET ENEMA) 1 enema             Allergies:      Allergies   Allergen Reactions     Ciprofloxacin Dizziness and Nausea     Food      Fruits with cores and pits  Apples     Sulfa Drugs Unknown     Swollen joints     Furosemide Rash            Review of Systems:   A comprehensive 10 point review of systems (constitutional, ENT, cardiac, peripheral vascular, respiratory, GI, , Musculoskeletal, skin, Neurological) was performed and found to be negative except as described in this note.           Physical Exam:   COMPLETE EXAMINATION:   VITAL SIGNS: /64 (BP Location: Left arm)   Pulse 77   Temp 98.3  F (36.8  C) (Oral)   Resp 16   SpO2 100%   GENERAL:  No acute distress, calm and cooperative   RESP: Non labored breathing  ABD: Benign  SKIN: Grossly normal   LYMPHATIC:  Grossly normal  NEURO:  Grossly normal   VASCULAR: Satisfactory perfusion of all extremities  MUSCULOSKELETAL:     Right Lower Extremity  - See clinical photos in media tab.  Anterior knee incision edges well approximated.  Dry  "sanguinous drainage on Steri-Strips.  No active drainage.   - Erythema over anterior leg.  This area is warm to touch and mildly tender to palpation  - Knee range of motion: Minimal pain with range of motion from 5 to 80 degrees; pain mostly associated with \"tightness\" at terminal motion.   - 5/5 quad, TA, gastroc/soleus, EHL, FHL, wiggles toes. Able to maintain a straight leg raise without pain  - SILT to superficial peroneal, deep peroneal, saphenous, sural, and tibial nerve distributions  - 2+ DP/PT pulses, foot warm and well perfused              Data:   All pertinent laboratory data reviewed  Recent Labs   Lab Test 10/28/20  1650 10/28/20  1603 10/25/20  0558 10/24/20  0659 10/23/20  1110 06/18/18  1024 06/18/18  1024   HGB 8.9*  --  9.3* 8.9* 12.8   < >  --    SED  --  100*  --   --   --   --  17   CRP  --  108.0*  --   --   --   --  8.9*   WBC 6.0  --   --  10.4 6.0   < >  --     < > = values in this interval not displayed.     Recent Labs   Lab Test 08/16/19  1600   FTYP Peritoneal fluid   FNEU 28   FOTH 24   FCOL Yellow   FAPR Slightly Cloudy   FWBC 158       Imaging: Right lower extremity ultrasound obtained 10/28/958812 at outside hospital unremarkable for DVT.    No new radiographs      This consultation has been discussed with Dr. Wallace, Attending Physician.    Sarwat Mccracken MD  PGY-4, Orthopaedic Surgery  Pager: [phone]      "

## 2020-10-29 NOTE — UTILIZATION REVIEW
Admission Status; Secondary Review Determination       Under the authority of the Utilization Management Committee, the utilization review process indicated a secondary review on the above patient. The review outcome is based on review of the medical records, discussions with staff, and applying clinical experience noted on the date of the review.     (x) Inpatient Status Appropriate - This patient's medical care is consistent with medical management for inpatient care and reasonable inpatient medical practice.     RATIONALE FOR DETERMINATION   61 yo female with history of liver transplant on chronic immunosuppression with cyclosporine who was admitted for suspected prosthetic joint infection along with right knee cellulitis and swelling after having recently undergone a right total knee arthroplasty on 10/23/2020.  CRP significantly elevated at 108.  Admitted to orthopedic service.  Has received cefazolin and vancomycin.    Given  the cellulitic area is located over a prosthesis AND the patient is immunocompromised, inpatient criteria is met.    At the time of admission with the information available to the attending physician more than 2 nights hospital complex care was anticipated, based on patient risk of adverse outcome if treated as outpatient and complex care required. Inpatient admission is appropriate based on the Medicare guidelines.     This document was produced using voice recognition software.    The information on this document is developed by the utilization review team in order for the business office to ensure compliance. This only denotes the appropriateness of proper admission status and does not reflect the quality of care rendered.   The definitions of Inpatient Status and Observation Status used in making the determination above are those provided in the CMS Coverage Manual, Chapter 1 and Chapter 6, section 70.4.     Sincerely,   Montse Steven MD  Utilization Review  Physician  Advisor  [facility].

## 2020-10-29 NOTE — PHARMACY-VANCOMYCIN DOSING SERVICE
Pharmacy Vancomycin Initial Note  Date of Service 2020  Patient's  1960  60 year old, female    Indication: Skin and Soft Tissue Infection and Cellulitis in setting of recent knee replacement and immunosuppression    Current estimated CrCl = Estimated Creatinine Clearance: 59 mL/min (A) (based on SCr of 1.1 mg/dL (H)).    Creatinine for last 3 days  10/28/2020:  4:03 PM Creatinine 1.10 mg/dL    Recent Vancomycin Level(s) for last 3 days  No results found for requested labs within last 72 hours.      Vancomycin IV Administrations (past 72 hours)      No vancomycin orders with administrations in past 72 hours.                Nephrotoxins and other renal medications (From now, onward)    Start     Dose/Rate Route Frequency Ordered Stop    10/29/20 0800  cycloSPORINE modified (GENERIC EQUIVALENT) capsule 75 mg      75 mg Oral 2 TIMES DAILY 10/29/20 0117      10/29/20 0430  vancomycin (VANCOCIN) 1,500 mg in sodium chloride 0.9 % 250 mL intermittent infusion      1,500 mg  over 90 Minutes Intravenous EVERY 12 HOURS 10/29/20 0354            Contrast Orders - past 72 hours (72h ago, onward)    None                Plan:  1.  Start vancomycin  1500 mg IV q12h.   2.  Goal Trough Level: 10-15 mg/L   3.  Pharmacy will check trough levels as appropriate in 1-3 Days.    4. Serum creatinine levels will be ordered daily for the first week of therapy and at least twice weekly for subsequent weeks.    5. [place] method utilized to dose vancomycin therapy: Method 1    [place] [facility]

## 2020-10-29 NOTE — PROVIDER NOTIFICATION
Pt arrived to the unit from [facility] around 0030. Pt oriented to the room and call light system. Message sent over to on call orhto. EJ 60 year old here with righ knee cellulitis . pt just arrived to the unit and has no orders.

## 2020-10-29 NOTE — PROGRESS NOTES
Patient vital signs are at baseline: Yes  Patient able to ambulate as they were prior to admission or with assist devices provided by therapies during their stay:  Yes  Patient MUST void prior to discharge:  Yes  Patient able to tolerate oral intake:  Yes  Pain has adequate pain control using Oral analgesics:  Yes      Patient A & O x4. VSS. Pt up assist 1. Lung sounds clear. Patient denies chest pain, SOB, lightheadedness, dizziness, tingling, numbness, nausea, and vomiting. Bowel sounds active and tolerating regular diet. Drinking well and voiding spontaneously without difficulties. Right PIV infusing. Patient able to wiggle toes. Ace bandages BLE. Right leg is red, swollen, and painful. Pain is tolerable and patient taking oxy q3hr for pain. Plan is to discharge TBD. Patient demonstrates ability to use call light appropriately. Will continue to monitor patient.

## 2020-10-30 ENCOUNTER — APPOINTMENT (OUTPATIENT)
Dept: PHYSICAL THERAPY | Facility: CLINIC | Age: 60
DRG: 603 | End: 2020-10-30
Attending: ORTHOPAEDIC SURGERY
Payer: COMMERCIAL

## 2020-10-30 LAB
ANION GAP SERPL CALCULATED.3IONS-SCNC: 5 MMOL/L (ref 3–14)
BUN SERPL-MCNC: 15 MG/DL (ref 7–30)
CALCIUM SERPL-MCNC: 8.2 MG/DL (ref 8.5–10.1)
CHLORIDE SERPL-SCNC: 106 MMOL/L (ref 94–109)
CO2 SERPL-SCNC: 29 MMOL/L (ref 20–32)
CREAT SERPL-MCNC: 1.13 MG/DL (ref 0.52–1.04)
CRP SERPL-MCNC: 54.7 MG/L (ref 0–8)
GFR SERPL CREATININE-BSD FRML MDRD: 53 ML/MIN/{1.73_M2}
GLUCOSE SERPL-MCNC: 101 MG/DL (ref 70–99)
POTASSIUM SERPL-SCNC: 4.2 MMOL/L (ref 3.4–5.3)
SODIUM SERPL-SCNC: 140 MMOL/L (ref 133–144)
VANCOMYCIN SERPL-MCNC: 27 MG/L

## 2020-10-30 PROCEDURE — 250N000011 HC RX IP 250 OP 636: Performed by: STUDENT IN AN ORGANIZED HEALTH CARE EDUCATION/TRAINING PROGRAM

## 2020-10-30 PROCEDURE — 250N000013 HC RX MED GY IP 250 OP 250 PS 637: Performed by: STUDENT IN AN ORGANIZED HEALTH CARE EDUCATION/TRAINING PROGRAM

## 2020-10-30 PROCEDURE — 258N000003 HC RX IP 258 OP 636: Performed by: STUDENT IN AN ORGANIZED HEALTH CARE EDUCATION/TRAINING PROGRAM

## 2020-10-30 PROCEDURE — 97110 THERAPEUTIC EXERCISES: CPT | Mod: GP | Performed by: PHYSICAL THERAPIST

## 2020-10-30 PROCEDURE — 80202 ASSAY OF VANCOMYCIN: CPT | Performed by: ORTHOPAEDIC SURGERY

## 2020-10-30 PROCEDURE — 250N000013 HC RX MED GY IP 250 OP 250 PS 637: Performed by: INTERNAL MEDICINE

## 2020-10-30 PROCEDURE — 97116 GAIT TRAINING THERAPY: CPT | Mod: GP | Performed by: PHYSICAL THERAPIST

## 2020-10-30 PROCEDURE — 80048 BASIC METABOLIC PNL TOTAL CA: CPT | Performed by: STUDENT IN AN ORGANIZED HEALTH CARE EDUCATION/TRAINING PROGRAM

## 2020-10-30 PROCEDURE — 86140 C-REACTIVE PROTEIN: CPT | Performed by: STUDENT IN AN ORGANIZED HEALTH CARE EDUCATION/TRAINING PROGRAM

## 2020-10-30 PROCEDURE — 250N000012 HC RX MED GY IP 250 OP 636 PS 637: Performed by: STUDENT IN AN ORGANIZED HEALTH CARE EDUCATION/TRAINING PROGRAM

## 2020-10-30 PROCEDURE — 36415 COLL VENOUS BLD VENIPUNCTURE: CPT | Performed by: STUDENT IN AN ORGANIZED HEALTH CARE EDUCATION/TRAINING PROGRAM

## 2020-10-30 PROCEDURE — 97530 THERAPEUTIC ACTIVITIES: CPT | Mod: GP | Performed by: PHYSICAL THERAPIST

## 2020-10-30 PROCEDURE — 120N000002 HC R&B MED SURG/OB UMMC

## 2020-10-30 PROCEDURE — 99232 SBSQ HOSP IP/OBS MODERATE 35: CPT | Performed by: INTERNAL MEDICINE

## 2020-10-30 PROCEDURE — 36415 COLL VENOUS BLD VENIPUNCTURE: CPT | Performed by: ORTHOPAEDIC SURGERY

## 2020-10-30 RX ADMIN — FAMOTIDINE 20 MG: 20 TABLET ORAL at 19:33

## 2020-10-30 RX ADMIN — OXYCODONE HYDROCHLORIDE 5 MG: 5 TABLET ORAL at 22:25

## 2020-10-30 RX ADMIN — OXYCODONE HYDROCHLORIDE 5 MG: 5 TABLET ORAL at 19:34

## 2020-10-30 RX ADMIN — VANCOMYCIN HYDROCHLORIDE 1500 MG: 10 INJECTION, POWDER, LYOPHILIZED, FOR SOLUTION INTRAVENOUS at 04:13

## 2020-10-30 RX ADMIN — CYCLOSPORINE 75 MG: 25 CAPSULE, LIQUID FILLED ORAL at 22:25

## 2020-10-30 RX ADMIN — DOCUSATE SODIUM AND SENNOSIDES 2 TABLET: 8.6; 5 TABLET, FILM COATED ORAL at 19:33

## 2020-10-30 RX ADMIN — POLYETHYLENE GLYCOL 3350 17 G: 17 POWDER, FOR SOLUTION ORAL at 08:49

## 2020-10-30 RX ADMIN — LEVOTHYROXINE SODIUM 125 MCG: 125 TABLET ORAL at 08:48

## 2020-10-30 RX ADMIN — ACETAMINOPHEN 500 MG: 500 TABLET, FILM COATED ORAL at 09:01

## 2020-10-30 RX ADMIN — ACETAMINOPHEN 500 MG: 500 TABLET, FILM COATED ORAL at 22:25

## 2020-10-30 RX ADMIN — OXYCODONE HYDROCHLORIDE 5 MG: 5 TABLET ORAL at 10:53

## 2020-10-30 RX ADMIN — FAMOTIDINE 20 MG: 20 TABLET ORAL at 08:48

## 2020-10-30 RX ADMIN — OXYCODONE HYDROCHLORIDE 5 MG: 5 TABLET ORAL at 01:08

## 2020-10-30 RX ADMIN — ACETAMINOPHEN 500 MG: 500 TABLET, FILM COATED ORAL at 16:17

## 2020-10-30 RX ADMIN — DOCUSATE SODIUM AND SENNOSIDES 1 TABLET: 8.6; 5 TABLET, FILM COATED ORAL at 08:47

## 2020-10-30 RX ADMIN — OXYCODONE HYDROCHLORIDE 5 MG: 5 TABLET ORAL at 16:17

## 2020-10-30 RX ADMIN — OXYCODONE HYDROCHLORIDE 5 MG: 5 TABLET ORAL at 04:13

## 2020-10-30 RX ADMIN — CYCLOSPORINE 75 MG: 25 CAPSULE, LIQUID FILLED ORAL at 08:52

## 2020-10-30 RX ADMIN — ACETAMINOPHEN 500 MG: 500 TABLET, FILM COATED ORAL at 01:08

## 2020-10-30 RX ADMIN — OXYCODONE HYDROCHLORIDE 5 MG: 5 TABLET ORAL at 07:46

## 2020-10-30 RX ADMIN — SODIUM CHLORIDE 1000 ML: 9 INJECTION, SOLUTION INTRAVENOUS at 11:15

## 2020-10-30 NOTE — DISCHARGE SUMMARY
ORTHOPAEDIC SURGERY DISCHARGE SUMMARY     Date of Admission: 10/29/2020  Date of Discharge: 10/31/2020  1:58 PM  Disposition: Home  Staff Physician: Mario Wallace MD  Primary Care Provider: Wale Thurston    DISCHARGE DIAGNOSIS:  Right knee cellulitis     PROCEDURES:   None     BRIEF HISTORY:  60 year old female s/p R TKA on 10/23/20 with Dr. Wallace, readmitted 10/29 with anterior leg cellulitis. Low suspicion for PJI. Likely a complication of her chronic lymphedema.    HOSPITAL COURSE:    The patient was admitted following the above listed procedures for pain control and rehabilitation. Nicci Pemberton did well during her hospital stay. Medicine was consulted to aid in management of medical co-morbidities. The patient received routine nursing cares and at the time of discharge was medically stable. Vital signs were stable throughout admission. The patient is tolerating a regular diet and is voiding spontaneously. All PT/OT goals have been met for safe mobility. Pain is now controlled on oral medications which will be available on discharge. Stool softeners have been used while taking pain medications to help prevent constipation. Nicci Pemberton is deemed medically safe to discharge.     Antibiotics:  IV vancomycin during inpatient stay. No antibiotics at discharge.   DVT prophylaxis:  Aspirin 162 mg daily initiated after surgery and will be continued for 4 weeks as previously prescribed   PT Progress:  Has met PT/OT goals for safe mobility.    Weight bearing:  WBAT   Pain Meds:  Weaned off all IV pain meds by discharge.  Inpatient Events: No significant events or complications.     PHYSICAL EXAM:    Gen: No acute distress, resting comfortably in bed.  Resp: Non-labored breathing  MSK:  RLE:  - erythema over anterior leg, within demarcations   - +skin wrinkle   - no pitting edema    - anterior incision. Single punctate spot of serosanguinous output in proximal 25% of length. No expressible or active drainage.  Dressing applied.   - free ROM R knee without pain (stiff limited by postop effusion/ swelling)  - SILT femoral/tibial/sural/saphenous/DP/SP nerves  - Fires TA, EHL, FHL, GaSC  - PT/DP pulses 2+, foot wwp  - no calf pain, negative paige's     FOLLOWUP:    Follow up with Dr. Wallace team at 4 weeks postop as previously scheduled     Orthopaedic Surgery appointments are at the [facility] [place] ([address] [place] [address]). Call [phone] to schedule a follow-up appointment at this location with your provider.     PLANNED DISCHARGE ORDERS:      Discharge Medication List as of 10/31/2020  1:19 PM      START taking these medications    Details   cyclobenzaprine (FLEXERIL) 10 MG tablet Take 1 tablet (10 mg) by mouth 3 times daily as needed for muscle spasms or other (pain), Disp-30 tablet, R-0, E-Prescribe         CONTINUE these medications which have CHANGED    Details   acetaminophen (TYLENOL) 325 MG tablet Take 2 tablets (650 mg) by mouth every 4 hours, Disp-100 tablet, R-0, E-Prescribe      senna-docusate (SENOKOT-S/PERICOLACE) 8.6-50 MG tablet Take 1-2 tablets by mouth 2 times daily, Disp-30 tablet, R-0, E-Prescribe         CONTINUE these medications which have NOT CHANGED    Details   aspirin (ASA) 81 MG EC tablet Take 2 tablets (162 mg) by mouth 2 times daily, Disp-60 tablet, R-0, E-Prescribe      Cholecalciferol (VITAMIN D-3) 25 MCG (1000 UT) CAPS Take 1,000 Units by mouth 2 times daily, Disp-60 capsule, R-1, E-Prescribe      cycloSPORINE modified (GENERIC EQUIVALENT) 25 MG capsule Take 3 capsules (75 mg) by mouth 2 times daily, Disp-540 capsule,R-3, E-Prescribe      famotidine (PEPCID) 20 MG tablet Take 1 tablet (20 mg) by mouth 2 times daily, Disp-60 tablet, R-3, E-Prescribe      levothyroxine (SYNTHROID/LEVOTHROID) 125 MCG tablet Take 125 mcg by mouth daily , HistoricalManaged by PCP      oxyCODONE (ROXICODONE) 5 MG tablet Take 1-2 tablets (5-10 mg) by mouth every 3 hours as  needed for pain (Moderate to Severe), Disp-30 tablet, R-0, Local Print      polyethylene glycol (MIRALAX) 17 g packet Take 17 g by mouth daily, Disp-7 packet, R-0, Local Print               Discharge Procedure Orders   Reason for your hospital stay   Order Comments: You were admitted to the hospital for knee cellulitis     [facility]ult [facility] Follow-up and recommended labs and tests   Order Comments: You are to return to clinic with Dr. Wallace team at 4 weeks postop as previously scheduled.     The [facility]: [phone]     Discharge Instructions   Order Comments: TOTAL KNEE ARTHROPLASTY POST OPERATIVE DISCHARGE INSTRUCTIONS    FOLLOW UP APPOINTMENT  4 weeks postop with Dr Rodrigo butler     [facility]  [address] [place] [address]  [phone]    ACTIVITY  Weight bearing status:   You may bear weight on your operative extremity as tolerated, using assistive devices (walker, cane) as needed. As you begin to feel more comfortable ambulating, you may gradually transition from a walker to a cane. Eventually, you should wean from all assistive devices. Although we would like you to discontinue use of assistive devices as soon as possible, do not transition until you have worked with your physical therapist to achieve safe balance and comfort.       Exercises:   Perform the following exercises at least three times per day for the first four weeks after surgery to prevent complications, such as blood clots in your legs:  1) Point and flex your feet  2) Move your ankle around in big circles  3) Wiggle your toes   Also, perform thigh muscle tightening exercises for 10 to 15 minutes at least three times per day for the first four weeks after surgery.    Elevate your leg as much as possible  Use your compression stockings to help with lymphedema management     Athletic Activities:  Activities such as swimming, bicycling, jogging,  running, and stop-and-go sports should be avoided until permitted by your provider.    Driving:  Driving is not permitted until directed by your provider. Under no circumstance are you permitted to drive while using narcotic pain medications.    Return to Work:  You may return to work when directed by your provider.       COMFORT AND PAIN MANAGEMENT  Elevation:   During times of inactivity throughout the first two weeks after surgery, make an effort to decrease swelling by elevating your operative extremity. This is most effectively done by lying down and placing several pillows lengthwise under your thigh and calf to raise your toes above the level of your nose. To ensure that you knee remains in full extension, do not place pillows directly under your knee.     Icing:  An ice pack will be provided to control swelling and discomfort after surgery. Place a thin towel on your skin and apply the ice pack overtop. You may apply ice for 20 minutes as often as two times per hour.    Pain Medications:  You will be discharged with acetaminophen (Tylenol) and a narcotic medication for pain management after surgery. Acetaminophen can help to effectively manage pain when used as prescribed, however, do not exceed the maximum daily dose of 3000 mg. The narcotic pain medication should be reserved for severe, breakthrough pain. Take the narcotic medication as prescribed and use only as often as necessary.       ANTICOAGULATION  Continue your aspirin 162 mg daily for total 4 week postop course     WOUND CARE AND SHOWERING  Wound care:  You have a clean Aquacel dressing on your surgical wound. This dressing should stay in place for seven days to allow the incision to heal. After seven days, you may change the dressing. Please use sterile 4x4 gauze dressings with tape over top to secure the dressing. If the Aquacel dressing becomes wet or saturated with wound drainage, it is appropriate to change the dressing before seven days. If  drainage persists from the incision site to the extent that it is frequently saturating the dressing, please contact Dr. Moore's clinic.    Showering:  You may shower 48 hours after surgery, provided the Aquacel dressing is in place. You may allow water to run over the dressing, but do not to soak or submerge your wound underwater. The steri-strips (small white tape that is directly on the incision areas) should be left on until they fall off or are removed at your first office visit.    Tub Bathing:  Tub bathing, swimming, or any other activities that cause your incision to be submerged should be avoided until allowed by your provider. Typically, patients are allowed to return to these activities four weeks after surgery.      CONTACTING YOUR PHYSICIAN:  You may experience symptoms that require follow-up before your scheduled two week appointment. Please contact Dr. Moore's office if you experience:  1) Pain in your knee that persists or worsens in the first few days after surgery  2) Excessive redness or drainage of cloudy or bloody material from the wounds (Clear red tinted fluid and some mild drainage should be expected) or drainage of any kind five days after surgery  3) A temperature elevation greater than 101.5    4) Pain, swelling or redness in your calf  5) Numbness or weakness in your leg or foot      Regular business hours (Monday - Friday, 8am - 5pm):  [facility]: [phone]    After hours and weekends:  [facility] on call Orthopedic resident: [phone]     Activity   Order Comments: Your activity upon discharge will be as tolerated. Continue to perform knee ROM exercises, focusing on hyperextension to 90 degrees (or greater) of knee flexion.     Order Specific Question Answer Comments   Is discharge order? Yes      Full Code     Order Specific Question Answer Comments   Code status determined by: Discussion with patient/ legal  decision maker      Diet   Order Comments: You may return to your regular, pre-surgery diet unless instructed otherwise by your provider.     Order Specific Question Answer Comments   Is discharge order? Yes        Delfina Menon MD   Orthopaedic Surgery, PGY-4  Pager: [phone]

## 2020-10-30 NOTE — PHARMACY-VANCOMYCIN DOSING SERVICE
Pharmacy Vancomycin Note  Date of Service 2020  Patient's  1960   60 year old, female    Indication: Skin and Soft Tissue Infection, Cellulitis in setting of recent knee replacement and immunosuppression  Goal Trough Level: 10-15 mg/L  Day of Therapy: 2, since 10/29/20  Current Vancomycin regimen: 1500 mg IV q12h (15.5mg/kg/dose)    Current estimated CrCl = Estimated Creatinine Clearance: 57.4 mL/min (A) (based on SCr of 1.13 mg/dL (H)).    Creatinine for last 3 days  10/28/2020:  4:03 PM Creatinine 1.10 mg/dL  10/29/2020:  9:05 AM Creatinine 1.22 mg/dL  10/30/2020:  6:49 AM Creatinine 1.13 mg/dL    Recent Vancomycin Levels (past 3 days)  10/30/2020:  4:14 PM Vancomycin Level 27.0 mg/L    Vancomycin IV Administrations (past 72 hours)                   vancomycin (VANCOCIN) 1,500 mg in sodium chloride 0.9 % 250 mL intermittent infusion (mg) 1,500 mg New Bag 10/30/20 0413     1,500 mg New Bag 10/29/20 1608     1,500 mg New Bag  0443                Nephrotoxins and other renal medications (From now, onward)    Start     Dose/Rate Route Frequency Ordered Stop    10/31/20 0700  vancomycin (VANCOCIN) 1,500 mg in sodium chloride 0.9 % 250 mL intermittent infusion      1,500 mg  over 90 Minutes Intravenous EVERY 24 HOURS 10/30/20 1752      10/29/20 0800  cycloSPORINE modified (GENERIC EQUIVALENT) capsule 75 mg      75 mg Oral 2 TIMES DAILY 10/29/20 0117               Contrast Orders - past 72 hours (72h ago, onward)    None        Interpretation of levels and current regimen:  Trough level is  Supratherapeutic    Has serum creatinine changed > 50% in last 72 hours: No    Urine output:  unable to determine    Renal Function: Stable    Plan:  1.  Decrease Dose to 1500mg IV Q24H , decreased frequency 50%, hoping this will decrease the level ~50% as well.   2.  Pharmacy will check trough levels as appropriate in 1-3 Days.    3. Serum creatinine levels will be ordered every other day for at least 10 days  while on concomitant nephrotoxins.      Kera Lindsey, PharmD, BCPS

## 2020-10-30 NOTE — PROGRESS NOTES
Orthopaedic Surgery Progress Note 10/30/2020    IE: No acute events overnight. AFVSS.     S: Pain controlled. Feels erythema is improving. Has less pain. Denies numbness or tingling. Denies chest pain, SOB, nausea/vomiting. Tolerating diet. +flatus. Voiding spontaneously.  Up with nursing. Up with PT who anticipates eventual home discharge. Care plan reviewed and questions answered.     O:  Temp: 96.1  F (35.6  C) Temp src: Oral BP: 129/62 Pulse: 63   Resp: 16 SpO2: 100 % O2 Device: None (Room air)      Exam:  Gen: No acute distress, resting comfortably in bed.  Resp: Non-labored breathing  MSK:  RLE:  - erythema over anterior leg, within demarcations   - +skin wrinkle   - no pitting edema    - anterior incision. Single punctate spot of serosanguinous output in proximal 25% of length. No expressible or active drainage. Dressing applied.   - SILT femoral/tibial/sural/saphenous/DP/SP nerves  - Fires TA, EHL, FHL, GaSC  - PT/DP pulses 2+, foot wwp  - no calf pain, negative paige's     Recent Labs   Lab 10/28/20  1650 10/28/20  1603 10/25/20  0558 10/24/20  0659 10/23/20  1110   WBC 6.0  --   --  10.4 6.0   HGB 8.9*  --  9.3* 8.9* 12.8     --   --  114* 154   CRP  --  108.0*  --   --   --      DVU US 10/29: No DVT     Assessment: Nicci Pemberton is a 60 year old female s/p R TKA on 10/23/20 with Dr. Wallace, readmitted 10/29 with anterior leg cellulitis. Low suspicion for PJI.     Awaiting CRP this morning. Given clinical improvement, could consider transitioning antibiotics to oral regiman and monitor for continued clinical improvement.     Plan:  Ortho Primary; Medicine comanage  Activity: Up with assist.  Weight bearing status: WBAT  Antibiotics: Empiric Vanco, transition to p.o. antibiotics pending clinical improvement  Diet: okay for regular diet from ortho perspective.  DVT prophylaxis: Hold ASA for now  Bracing/Splinting: none.  Tubigrip stocking for compression  Elevation: Elevate RLE on pillows.  No pillows  behind the knee.  No catching of the bed.  Wound Care: Keep incision clean and dry  Pain management: PO meds  Imaging: Complete  Labs: Complete  Consults: Medicine, PT, OT, social work  Disposition: TBD pending clinical course    Patient discussed with Dr. Wallace.    Delfina Menon MD 10/30/2020  Orthopedic Surgery, PGY4  Pager: [phone]    Please page me directly with any questions/concerns prior to paging the orthopaedic surgery resident on call on M-F between 7AM-4PM. Thanks!

## 2020-10-30 NOTE — PROGRESS NOTES
"[facility], [place]   Internal Medicine Daily Note           Interval History/Events     Overnight events  Reports doing well  Pain, swelling and redness is improving  NO nausea, vomiting, No chest pain, shortness of breath           Review of Systems        4 point ROS including Respiratory, CV, GI and , other than that noted above is negative      Medications   I have reviewed current medications  in the \"current medication\" section of Epic.  Relevant changes include:     Physical Exam   General:       Vital signs:    Blood pressure 121/64, pulse 71, temperature 97.1  F (36.2  C), temperature source Oral, resp. rate 16, SpO2 99 %, not currently breastfeeding.  Estimated body mass index is 38.95 kg/m  as calculated from the following:    Height as of 10/23/20: 1.575 m (5' 2.01\").    Weight as of 10/28/20: 96.6 kg (213 lb).    No intake or output data in the 24 hours ending 10/30/20 1312     Constitutional: Laying in bed in no acute distress  Eye: No icterus, no pallor  Mouth/ENT: Normal oral mucosa.   Cardiovascular: S1, S2 normal.   Respiratory: B/L CTA  GI: No durbin's catheter  : Soft, NT, BS+  Neurology:   Psych:   MSK: Right knee is mildly swollen. Incision site looks good. Redness is within [facility] ed area.   Integumentary:   Heme/Lymph/Imm:      Laboratory and Imaging Studies     I have reviewed  laboratory and imaging studies in the Epic. Pertinent findings are as below:    BMP  Recent Labs   Lab 10/30/20  0649 10/29/20  0905 10/28/20  1603 10/24/20  0659    139 135 137   POTASSIUM 4.2 4.6 4.0 5.3   CHLORIDE 106 104 100 107   JACOB 8.2* 8.7 9.4 7.9*   CO2 29 32 32 22   BUN 15 20 22 25   CR 1.13* 1.22* 1.10* 0.95   * 82 94 117*     CBC  Recent Labs   Lab 10/28/20  1650 10/24/20  0659 10/24/20  0659   WBC 6.0  --  10.4   RBC 2.65*  --  2.66*   HGB 8.9*   < > 8.9*   HCT 26.2*  --  27.2*   MCV 99  --  102*   MCH 33.6*  --  33.5*   MCHC 34.0  --  32.7   RDW 11.9  --  " 11.9     --  114*    < > = values in this interval not displayed.     INRNo lab results found in last 7 days.  LFTs  Recent Labs   Lab 10/29/20  0905 10/28/20  1603 10/24/20  0659   ALKPHOS 107 137 90   AST 20 28 20   ALT 14 18 18   BILITOTAL 1.1 1.6* 1.2   PROTTOTAL 6.2* 7.5 5.1*   ALBUMIN 2.5* 3.1* 2.4*      PANCNo lab results found in last 7 days.        Impression/Plan      Nicci Pemberton is a 60 year old female with history of liver transplant 2019 and recent right TKA admitted on 10/29/2020 for right lower extremity cellulitis.     # Right lower extremity cellulitis, right leg swelling, s/p right TKA: No evidence of DVT. Received cefazolin in ED. Started on Vancomycin. Improving erythema  CRP coming down at 54.7 on 10/30  - Defer management to primary service.      # History of liver transplant: Liver enzymes are stable.   Continue prior cyclosporine 75 mg twice daily  Limit acetaminophen to 2g daily  - Follow up with GI as before     # Hypothyroidism:   Continue prior levothyroxine 125 mcg daily     # Gastroesophageal reflux disease:  Continue prior famotidine 20 mg twice daily     # CKD: Monitor clinically. Recent creatinine ranges between 1.1-1.2.  Creatinine 1.13 on 10/30  - Avoid Nephrotoxins  - Renal dose medications  - Avoid  Hypotension, hypovolemia          Pt's care was discussed with bedside RN, patient and  during Care Team Rounds.               Josué Jimenez MD  Hospitalist ( Internal medicine)  Pager: [phone]

## 2020-10-31 ENCOUNTER — APPOINTMENT (OUTPATIENT)
Dept: PHYSICAL THERAPY | Facility: CLINIC | Age: 60
DRG: 603 | End: 2020-10-31
Attending: ORTHOPAEDIC SURGERY
Payer: COMMERCIAL

## 2020-10-31 VITALS
DIASTOLIC BLOOD PRESSURE: 75 MMHG | TEMPERATURE: 97.6 F | RESPIRATION RATE: 16 BRPM | SYSTOLIC BLOOD PRESSURE: 122 MMHG | OXYGEN SATURATION: 99 % | HEART RATE: 66 BPM

## 2020-10-31 PROCEDURE — 250N000011 HC RX IP 250 OP 636: Performed by: ORTHOPAEDIC SURGERY

## 2020-10-31 PROCEDURE — 258N000003 HC RX IP 258 OP 636: Performed by: ORTHOPAEDIC SURGERY

## 2020-10-31 PROCEDURE — 99232 SBSQ HOSP IP/OBS MODERATE 35: CPT | Performed by: INTERNAL MEDICINE

## 2020-10-31 PROCEDURE — 250N000013 HC RX MED GY IP 250 OP 250 PS 637: Performed by: STUDENT IN AN ORGANIZED HEALTH CARE EDUCATION/TRAINING PROGRAM

## 2020-10-31 PROCEDURE — 250N000012 HC RX MED GY IP 250 OP 636 PS 637: Performed by: STUDENT IN AN ORGANIZED HEALTH CARE EDUCATION/TRAINING PROGRAM

## 2020-10-31 PROCEDURE — 97116 GAIT TRAINING THERAPY: CPT | Mod: GP

## 2020-10-31 PROCEDURE — 250N000013 HC RX MED GY IP 250 OP 250 PS 637: Performed by: INTERNAL MEDICINE

## 2020-10-31 PROCEDURE — 97530 THERAPEUTIC ACTIVITIES: CPT | Mod: GP

## 2020-10-31 RX ADMIN — CYCLOBENZAPRINE 10 MG: 10 TABLET, FILM COATED ORAL at 01:44

## 2020-10-31 RX ADMIN — ACETAMINOPHEN 500 MG: 500 TABLET, FILM COATED ORAL at 13:38

## 2020-10-31 RX ADMIN — POLYETHYLENE GLYCOL 3350 17 G: 17 POWDER, FOR SOLUTION ORAL at 08:27

## 2020-10-31 RX ADMIN — LEVOTHYROXINE SODIUM 125 MCG: 125 TABLET ORAL at 08:27

## 2020-10-31 RX ADMIN — VANCOMYCIN HYDROCHLORIDE 1500 MG: 1 INJECTION, POWDER, LYOPHILIZED, FOR SOLUTION INTRAVENOUS at 06:51

## 2020-10-31 RX ADMIN — DOCUSATE SODIUM AND SENNOSIDES 2 TABLET: 8.6; 5 TABLET, FILM COATED ORAL at 08:27

## 2020-10-31 RX ADMIN — ACETAMINOPHEN 500 MG: 500 TABLET, FILM COATED ORAL at 04:38

## 2020-10-31 RX ADMIN — OXYCODONE HYDROCHLORIDE 10 MG: 5 TABLET ORAL at 08:37

## 2020-10-31 RX ADMIN — OXYCODONE HYDROCHLORIDE 5 MG: 5 TABLET ORAL at 13:38

## 2020-10-31 RX ADMIN — FAMOTIDINE 20 MG: 20 TABLET ORAL at 08:27

## 2020-10-31 RX ADMIN — OXYCODONE HYDROCHLORIDE 10 MG: 5 TABLET ORAL at 01:30

## 2020-10-31 RX ADMIN — CYCLOSPORINE 75 MG: 25 CAPSULE, LIQUID FILLED ORAL at 08:27

## 2020-10-31 RX ADMIN — OXYCODONE HYDROCHLORIDE 5 MG: 5 TABLET ORAL at 04:38

## 2020-10-31 NOTE — PROGRESS NOTES
Orthopaedic Surgery Progress Note 10/31/2020    IE: No acute events overnight. AFVSS.     S: Pain controlled. Feels erythema continues to improve. Up ad adelina with little pain yesterday. Has been elevating more. +flatus. Voiding spontaneously.  Up with nursing. Up with PT who anticipates eventual home discharge. Care plan reviewed and questions answered.     O:  Temp: 96.9  F (36.1  C) Temp src: Oral BP: (!) 146/66 Pulse: 83   Resp: 16 SpO2: 97 % O2 Device: None (Room air)      Exam:  Gen: No acute distress, resting comfortably in bed.  Resp: Non-labored breathing  MSK:  RLE:  - erythema over anterior leg, within demarcations   - +skin wrinkle   - no pitting edema    - anterior incision. Single punctate spot of serosanguinous output in proximal 25% of length. No expressible or active drainage. Dressing applied.   - free ROM R knee without pain (stiff limited by postop effusion/ swelling)  - SILT femoral/tibial/sural/saphenous/DP/SP nerves  - Fires TA, EHL, FHL, GaSC  - PT/DP pulses 2+, foot wwp  - no calf pain, negative paige's     Recent Labs   Lab 10/30/20  0649 10/28/20  1650 10/28/20  1603 10/25/20  0558 10/24/20  0659   WBC  --  6.0  --   --  10.4   HGB  --  8.9*  --  9.3* 8.9*   PLT  --  160  --   --  114*   CRP 54.7*  --  108.0*  --   --      DVU US 10/29: No DVT     Assessment: Nicci Pemberton is a 60 year old female s/p R TKA on 10/23/20 with Dr. Wallace, readmitted 10/29 with anterior leg cellulitis. Low suspicion for PJI.     CRP declined 50% in 48 hours on IV antibiotics and clinical improvement. Anticipate discharge home 10/31 without antibiotics.     Plan:  Ortho Primary; Medicine comanage  Activity: Up with assist.  Weight bearing status: WBAT  Antibiotics: Empiric Vanco until time of discharge.   Diet: okay for regular diet from ortho perspective.  DVT prophylaxis: Hold ASA for now  Bracing/Splinting: none.  Tubigrip stocking for compression thigh high (ordered 10/30)   Elevation: Elevate RLE on pillows.   No pillows behind the knee.  No catching of the bed.  Wound Care: Keep incision clean and dry  Pain management: PO meds  Imaging: Complete  Labs: Complete  Consults: Medicine, PT, OT, social work  Disposition: discharge home 10/31 without antibiotics pending medicine and PT clearance.     Patient discussed with Dr. Wallace.    Delfina Menon MD 10/31/2020  Orthopedic Surgery, PGY4  Pager: [phone]    Please page me directly with any questions/concerns prior to paging the orthopaedic surgery resident on call on M-F between 7AM-4PM. Thanks!

## 2020-10-31 NOTE — PROGRESS NOTES
"[facility], [place]   Internal Medicine Daily Note           Interval History/Events     Overnight events  Reports doing  Leg swelling and redness is much better  NO nausea, vomiting, chest pain, shortness of breath         Review of Systems        4 point ROS including Respiratory, CV, GI and , other than that noted above is negative      Medications   I have reviewed current medications  in the \"current medication\" section of Epic.  Relevant changes include:     Physical Exam   General:       Vital signs:    Blood pressure 122/75, pulse 66, temperature 97.6  F (36.4  C), temperature source Oral, resp. rate 16, SpO2 99 %, not currently breastfeeding.  Estimated body mass index is 38.95 kg/m  as calculated from the following:    Height as of 10/23/20: 1.575 m (5' 2.01\").    Weight as of 10/28/20: 96.6 kg (213 lb).    No intake or output data in the 24 hours ending 10/30/20 1312     Constitutional: Laying in bed in no acute distress  Eye: No icterus, no pallor  Mouth/ENT: Normal oral mucosa.   Cardiovascular: S1, S2 normal.   Respiratory: B/L CTA  GI: No durbin's catheter  : Soft, NT, BS+  Neurology:   Psych:   MSK: Right knee is mildly swollen. Incision site looks good. Redness is within [facility] ed area.   Integumentary:   Heme/Lymph/Imm:      Laboratory and Imaging Studies     I have reviewed  laboratory and imaging studies in the Epic. Pertinent findings are as below:    BMP  Recent Labs   Lab 10/30/20  0649 10/29/20  0905 10/28/20  1603    139 135   POTASSIUM 4.2 4.6 4.0   CHLORIDE 106 104 100   JACOB 8.2* 8.7 9.4   CO2 29 32 32   BUN 15 20 22   CR 1.13* 1.22* 1.10*   * 82 94     CBC  Recent Labs   Lab 10/28/20  1650   WBC 6.0   RBC 2.65*   HGB 8.9*   HCT 26.2*   MCV 99   MCH 33.6*   MCHC 34.0   RDW 11.9        INRNo lab results found in last 7 days.  LFTs  Recent Labs   Lab 10/29/20  0905 10/28/20  1603   ALKPHOS 107 137   AST 20 28   ALT 14 18   BILITOTAL 1.1 " 1.6*   PROTTOTAL 6.2* 7.5   ALBUMIN 2.5* 3.1*      PANCNo lab results found in last 7 days.        Impression/Plan      Nicci Pemberton is a 60 year old female with history of liver transplant 2019 and recent right TKA admitted on 10/29/2020 for right lower extremity cellulitis.     # Right lower extremity cellulitis, right leg swelling, s/p right TKA: No evidence of DVT. Received cefazolin in ED. Started on Vancomycin. Improving erythema, swelling  CRP coming down at 54.7 on 10/30  Ortho plan to discontinue Vancomycin on discharge. No further antibiotics planned. Discussed with ORtho on 10/31     # History of liver transplant: Liver enzymes are stable.   Continue prior cyclosporine 75 mg twice daily  Limit acetaminophen to 2g daily  - Follow up with GI as before     # Hypothyroidism:   Continue prior levothyroxine 125 mcg daily     # Gastroesophageal reflux disease:  Continue prior famotidine 20 mg twice daily     # CKD: Monitor clinically. Recent creatinine ranges between 1.1-1.2.  Creatinine 1.13 on 10/30  - Avoid Nephrotoxins  - Renal dose medications  - Avoid  Hypotension, hypovolemia          Pt's care was discussed with bedside RN, patient and  during Care Team Rounds.               Josué Jimenez MD  Hospitalist ( Internal medicine)  Pager: [phone]

## 2020-11-01 ENCOUNTER — PATIENT OUTREACH (OUTPATIENT)
Dept: CARE COORDINATION | Facility: CLINIC | Age: 60
End: 2020-11-01

## 2020-11-02 ENCOUNTER — TELEPHONE (OUTPATIENT)
Dept: TRANSPLANT | Facility: CLINIC | Age: 60
End: 2020-11-02

## 2020-11-02 NOTE — TELEPHONE ENCOUNTER
Patient called to report that she accidentally took a double dose of CSA on Sunday and she did not take her evening dose as she felt that was the most logic. Patient aware if that happens again to ensure she calls to discuss with transplant team. Pt will ensure adequate hydration and be back on track today

## 2020-11-03 ENCOUNTER — TELEPHONE (OUTPATIENT)
Dept: ORTHOPEDICS | Facility: CLINIC | Age: 60
End: 2020-11-03

## 2020-11-03 NOTE — TELEPHONE ENCOUNTER
RN called and spoke with patient. Pleasant lady. Healing well.  Just want to make sure she is taking the appropriate meds. RN advised patient and explained to the patient the difference between a narcotic and a muscle relaxant. Patient expressed understanding, stating the oxycodone is managing her pain well.   We will see her back on the 4th week post op.    Colten Avila RN        [facility] Call Center    Phone Message    May a detailed message be left on voicemail: yes     Reason for Call: Medication Question or concern regarding medication   Prescription Clarification  Name of Medication: Oxycodone and Flexeril  Prescribing Provider: Dr. Wallace   Pharmacy:    What on the order needs clarification? Pt called in stating she had surgery on 10/23/20, then admitted for infection.  Pt stated she was given Oxycodone's and Flexeril and Pt stated she read those two medications should not be taken together.  Pt is requesting a call today to know as she is still in pain from surgery          Action Taken: Message routed to:  [facility]o    Travel Screening: Not Applicable

## 2020-11-04 ENCOUNTER — TELEPHONE (OUTPATIENT)
Dept: ORTHOPEDICS | Facility: CLINIC | Age: 60
End: 2020-11-04

## 2020-11-04 NOTE — TELEPHONE ENCOUNTER
RN called and spoke with patient. Pleasant lady. Per patient, the amount of blood is very scant, she wiped it off and kept it clean. RN asked if there is any increased in redness, any active drainage, if she has any fever or chills. Patient denies all of these. Patient told RN that during the hospital stay, Dr. Wallace has seen this spot of that is slightly raised and Dr. Wallace told patient this is nothing concerning even though it's not of one of his better suturing placement.   RN told patient continue to monitor the incision site. Keeping it clean and dry. And notified RN for any changes.   Patient will start physical therapy tomorrow and RN gave patient advise and what to anticipate post therapy, advise patient to continue with the icing, elevation and compression in order to reduce any residual swelling from the surgery or from post physical therapy. Patient expressed understanding.    [facility]    Phone Message    May a detailed message be left on voicemail: yes     Reason for Call: Symptoms or Concerns     If patient has red-flag symptoms, warm transfer to triage line    Current symptom or concern: Small amount of bleeding from surgical site per Pt, she said nothing major but would like to discuss.  Requesting a call from Rafal or anyone on Dr. Wallace's care team. Declined Red Flag Triage    Symptoms have been present for:  1 day(s)    Has patient previously been seen for this? Yes    By Dr. Wallace    Date: DOS-10/23/20    Are there any new or worsening symptoms?       Action Taken: Message routed to:  [facility] Ortho    Travel Screening: Not Applicable

## 2020-11-05 ENCOUNTER — HOSPITAL ENCOUNTER (OUTPATIENT)
Dept: PHYSICAL THERAPY | Facility: CLINIC | Age: 60
Setting detail: THERAPIES SERIES
End: 2020-11-05
Attending: ORTHOPAEDIC SURGERY
Payer: COMMERCIAL

## 2020-11-05 DIAGNOSIS — G89.29 CHRONIC PAIN OF BOTH KNEES: ICD-10-CM

## 2020-11-05 DIAGNOSIS — M25.561 CHRONIC PAIN OF BOTH KNEES: ICD-10-CM

## 2020-11-05 DIAGNOSIS — M25.562 CHRONIC PAIN OF BOTH KNEES: ICD-10-CM

## 2020-11-05 PROCEDURE — 97110 THERAPEUTIC EXERCISES: CPT | Mod: GP | Performed by: PHYSICAL MEDICINE & REHABILITATION

## 2020-11-05 RX ORDER — OXYCODONE HYDROCHLORIDE 5 MG/1
5-10 TABLET ORAL EVERY 6 HOURS PRN
Qty: 40 TABLET | Refills: 0 | Status: SHIPPED | OUTPATIENT
Start: 2020-11-05 | End: 2020-11-20

## 2020-11-05 NOTE — TELEPHONE ENCOUNTER
RN send med refill to Dr. Rojas to sign and authorize.    Colten Avila RN      [facility] Call Center    Phone Message    May a detailed message be left on voicemail: yes     Reason for Call: Medication Refill Request    Has the patient contacted the pharmacy for the refill? Yes   Name of medication being requested: oxycodone  Provider who prescribed the medication: fausto rojas MD  Pharmacy: [facility] in [place], [place]  Date medication is needed: 11/5/2020         Action Taken: Message routed to:  [facility] ortho    Travel Screening: Not Applicable

## 2020-11-10 ENCOUNTER — HOSPITAL ENCOUNTER (OUTPATIENT)
Dept: PHYSICAL THERAPY | Facility: CLINIC | Age: 60
Setting detail: THERAPIES SERIES
End: 2020-11-10
Attending: ORTHOPAEDIC SURGERY
Payer: COMMERCIAL

## 2020-11-10 PROCEDURE — 97110 THERAPEUTIC EXERCISES: CPT | Mod: GP | Performed by: PHYSICAL MEDICINE & REHABILITATION

## 2020-11-12 ENCOUNTER — HOSPITAL ENCOUNTER (OUTPATIENT)
Dept: PHYSICAL THERAPY | Facility: CLINIC | Age: 60
Setting detail: THERAPIES SERIES
End: 2020-11-12
Attending: ORTHOPAEDIC SURGERY
Payer: COMMERCIAL

## 2020-11-12 PROCEDURE — 97140 MANUAL THERAPY 1/> REGIONS: CPT | Mod: GP | Performed by: PHYSICAL MEDICINE & REHABILITATION

## 2020-11-12 PROCEDURE — 97110 THERAPEUTIC EXERCISES: CPT | Mod: GP | Performed by: PHYSICAL MEDICINE & REHABILITATION

## 2020-11-17 ENCOUNTER — HOSPITAL ENCOUNTER (OUTPATIENT)
Dept: PHYSICAL THERAPY | Facility: CLINIC | Age: 60
Setting detail: THERAPIES SERIES
End: 2020-11-17
Attending: ORTHOPAEDIC SURGERY
Payer: COMMERCIAL

## 2020-11-17 PROCEDURE — 97140 MANUAL THERAPY 1/> REGIONS: CPT | Mod: GP | Performed by: PHYSICAL MEDICINE & REHABILITATION

## 2020-11-17 PROCEDURE — 97110 THERAPEUTIC EXERCISES: CPT | Mod: GP | Performed by: PHYSICAL MEDICINE & REHABILITATION

## 2020-11-19 ENCOUNTER — HOSPITAL ENCOUNTER (OUTPATIENT)
Dept: PHYSICAL THERAPY | Facility: CLINIC | Age: 60
Setting detail: THERAPIES SERIES
End: 2020-11-19
Attending: ORTHOPAEDIC SURGERY
Payer: COMMERCIAL

## 2020-11-19 PROCEDURE — 97140 MANUAL THERAPY 1/> REGIONS: CPT | Mod: GP | Performed by: PHYSICAL MEDICINE & REHABILITATION

## 2020-11-19 PROCEDURE — 97110 THERAPEUTIC EXERCISES: CPT | Mod: GP | Performed by: PHYSICAL MEDICINE & REHABILITATION

## 2020-11-20 DIAGNOSIS — Z96.651 STATUS POST RIGHT KNEE REPLACEMENT: Primary | ICD-10-CM

## 2020-11-20 ASSESSMENT — ENCOUNTER SYMPTOMS
WEIGHT LOSS: 1
FEVER: 1
HOARSE VOICE: 0
SINUS CONGESTION: 1
TASTE DISTURBANCE: 0
FATIGUE: 1
BRUISES/BLEEDS EASILY: 0
BLOATING: 0
CONSTIPATION: 0
HEARTBURN: 0
DECREASED APPETITE: 1
CHILLS: 1
INSOMNIA: 1
MUSCLE WEAKNESS: 1
NERVOUS/ANXIOUS: 1
DEPRESSION: 1
ALTERED TEMPERATURE REGULATION: 1
SMELL DISTURBANCE: 0
BOWEL INCONTINENCE: 0
POLYPHAGIA: 0
NIGHT SWEATS: 1
ABDOMINAL PAIN: 0
SWOLLEN GLANDS: 0
NAUSEA: 0
SINUS PAIN: 1
INCREASED ENERGY: 1
JOINT SWELLING: 1
RECTAL PAIN: 0
NECK PAIN: 0
BACK PAIN: 0
VOMITING: 0
MUSCLE CRAMPS: 0
WEIGHT GAIN: 0
ARTHRALGIAS: 1
SORE THROAT: 0
MYALGIAS: 0
DIARRHEA: 1
PANIC: 0
POLYDIPSIA: 0
NECK MASS: 0
TROUBLE SWALLOWING: 0
BLOOD IN STOOL: 0
JAUNDICE: 0
STIFFNESS: 1
HALLUCINATIONS: 0
DECREASED CONCENTRATION: 0

## 2020-11-20 ASSESSMENT — KOOS JR: HOW SEVERE IS YOUR KNEE STIFFNESS AFTER FIRST WAKING IN MORNING: SEVERE

## 2020-11-20 ASSESSMENT — ACTIVITIES OF DAILY LIVING (ADL): FUNCTION,_DAILY_LIVING_SCORE: 52.94

## 2020-11-23 ENCOUNTER — OFFICE VISIT (OUTPATIENT)
Dept: ORTHOPEDICS | Facility: CLINIC | Age: 60
End: 2020-11-23
Payer: COMMERCIAL

## 2020-11-23 ENCOUNTER — TELEPHONE (OUTPATIENT)
Dept: TRANSPLANT | Facility: CLINIC | Age: 60
End: 2020-11-23

## 2020-11-23 ENCOUNTER — ANCILLARY PROCEDURE (OUTPATIENT)
Dept: GENERAL RADIOLOGY | Facility: CLINIC | Age: 60
End: 2020-11-23
Attending: ORTHOPAEDIC SURGERY
Payer: COMMERCIAL

## 2020-11-23 DIAGNOSIS — Z96.651 STATUS POST TOTAL RIGHT KNEE REPLACEMENT: Primary | ICD-10-CM

## 2020-11-23 DIAGNOSIS — Z94.4 LIVER REPLACED BY TRANSPLANT (H): ICD-10-CM

## 2020-11-23 DIAGNOSIS — Z13.220 LIPID SCREENING: ICD-10-CM

## 2020-11-23 DIAGNOSIS — Z96.651 STATUS POST RIGHT KNEE REPLACEMENT: ICD-10-CM

## 2020-11-23 DIAGNOSIS — Z94.4 LIVER REPLACED BY TRANSPLANT (H): Primary | ICD-10-CM

## 2020-11-23 PROBLEM — R60.0 BILATERAL EDEMA OF LOWER EXTREMITY: Status: ACTIVE | Noted: 2020-11-23

## 2020-11-23 PROBLEM — R53.81 PHYSICAL DEBILITY: Status: ACTIVE | Noted: 2020-11-23

## 2020-11-23 LAB
ALBUMIN SERPL-MCNC: 3.4 G/DL (ref 3.4–5)
ALP SERPL-CCNC: 167 U/L (ref 40–150)
ALT SERPL W P-5'-P-CCNC: 34 U/L (ref 0–50)
ANION GAP SERPL CALCULATED.3IONS-SCNC: 4 MMOL/L (ref 3–14)
AST SERPL W P-5'-P-CCNC: 39 U/L (ref 0–45)
BILIRUB DIRECT SERPL-MCNC: 0.3 MG/DL (ref 0–0.2)
BILIRUB SERPL-MCNC: 1.3 MG/DL (ref 0.2–1.3)
BUN SERPL-MCNC: 30 MG/DL (ref 7–30)
CALCIUM SERPL-MCNC: 9.4 MG/DL (ref 8.5–10.1)
CHLORIDE SERPL-SCNC: 108 MMOL/L (ref 94–109)
CO2 SERPL-SCNC: 26 MMOL/L (ref 20–32)
CREAT SERPL-MCNC: 1.07 MG/DL (ref 0.52–1.04)
CYCLOSPORINE BLD LC/MS/MS-MCNC: 103 UG/L (ref 50–400)
GFR SERPL CREATININE-BSD FRML MDRD: 56 ML/MIN/{1.73_M2}
GLUCOSE SERPL-MCNC: 86 MG/DL (ref 70–99)
POTASSIUM SERPL-SCNC: 4.4 MMOL/L (ref 3.4–5.3)
PROT SERPL-MCNC: 7.7 G/DL (ref 6.8–8.8)
SODIUM SERPL-SCNC: 138 MMOL/L (ref 133–144)
TME LAST DOSE: NORMAL H

## 2020-11-23 PROCEDURE — 36415 COLL VENOUS BLD VENIPUNCTURE: CPT | Performed by: PATHOLOGY

## 2020-11-23 PROCEDURE — 80048 BASIC METABOLIC PNL TOTAL CA: CPT | Performed by: PATHOLOGY

## 2020-11-23 PROCEDURE — 99024 POSTOP FOLLOW-UP VISIT: CPT | Mod: GC | Performed by: ORTHOPAEDIC SURGERY

## 2020-11-23 PROCEDURE — 80076 HEPATIC FUNCTION PANEL: CPT | Performed by: PATHOLOGY

## 2020-11-23 PROCEDURE — 73560 X-RAY EXAM OF KNEE 1 OR 2: CPT | Mod: RT | Performed by: RADIOLOGY

## 2020-11-23 PROCEDURE — 80158 DRUG ASSAY CYCLOSPORINE: CPT | Mod: 90 | Performed by: PATHOLOGY

## 2020-11-23 PROCEDURE — 99000 SPECIMEN HANDLING OFFICE-LAB: CPT | Performed by: PATHOLOGY

## 2020-11-23 PROCEDURE — 85027 COMPLETE CBC AUTOMATED: CPT | Performed by: PATHOLOGY

## 2020-11-23 RX ORDER — AMOXICILLIN 500 MG
CAPSULE ORAL
COMMUNITY
Start: 2020-08-21 | End: 2020-12-04

## 2020-11-23 RX ORDER — OXYCODONE HYDROCHLORIDE 5 MG/1
TABLET ORAL
COMMUNITY
Start: 2020-11-05 | End: 2020-11-25

## 2020-11-23 RX ORDER — DOCUSATE SODIUM 50MG AND SENNOSIDES 8.6MG 8.6; 5 MG/1; MG/1
TABLET, FILM COATED ORAL
COMMUNITY
Start: 2020-10-29 | End: 2020-11-25

## 2020-11-23 NOTE — LETTER
11/23/2020         RE: Nicci Pemberton  [address] [facility] [address]        Dear Colleague,    Thank you for referring your patient, Nicci Pemberton, to the [facility] [place]. Please see a copy of my visit note below.    Postoperative knee replacement follow-up clinic visit  DOS: 10/23/2020    Surgery: Right total knee replacement    Nicci Pemberton is doing well after last surgery listed above.    Preoperative knee pain is  Still Present  Analgesic medication: Tylenol only.    Patient reports persistent right knee pain.  Has been working diligently with therapy.  She states that her pain is much different than before surgery.  She describes this is a surgical type pain.  She reports significant stiffness that is maintaining.  She feels as though this is getting slightly better over time, but not significantly.  Her physical therapist last measured her for knee flexion at 70 degrees and her extension at 2 degrees shy of full.  She denies fevers or chills and she has been off antibiotics since that she was admitted for 48 hours on vancomycin.  She reports that the swelling and redness in her knee has significantly improved.  She has not fallen or had any subsequent trauma to the knee.  No significant increase in knee pain.      EXAM:  Incision healing, clean and dry.  Joint range of motion 3-82 degrees, measured with goniometer, pain free  Effusion: none  Periarticular swelling or leg edema: Minimal.  Photograph attached to chart review and media  Peroneal and tibial nerve function: Intact  Gait: Antalgic with a walker.    IMAGING:  Radiographs demonstrate the knee implant in satisfactory position without evidence of any complication.    IMPRESSION:  Stiffness following right total knee replacement.  Slowly progressing.  She is now 4 weeks postop.    We discussed the role for manipulation under anesthesia.  We would ideally like to see her at around 90 degrees at this  point.  We discussed doing a manipulation at this point versus giving her an additional 2 to 3 weeks of physical therapy to regain her motion.  She wished to proceed with continued aggressive physical therapy for the next 2 weeks.  If she is still not at 90 degrees of flexion after 2 weeks, she will reach out to us and we will schedule her for a manipulation under anesthesia.    Otherwise, continue Tylenol and other conservative therapies including compression, ice, heat for her pain..     PLAN:    Continue range of motion exercises and stretching.    Quadriceps strengthening exercises.    May be weight bearing as tolerated.    Discontinue DVT prophylaxis meds (i.e., warfarin or ASA) if not required for any other reason.  She will return to her previous dose of 81 mg ASA today.    Next follow-up visit at 3 months after surgery or sooner as needed.     The patient was seen and discussed with Dr. Wallace, who is agreement the above assessment and plan.    Angelo Amezquita MD  Orthopaedic Surgery PGY-4  #: [phone]      Attending MD (Dr. Mario Wallace) Attestation :  This patient was seen and evaluated by me including a history, exam, and interpretation of all imaging and/or lab data.  Either a training physician (resident/fellow), who also saw the patient, or scribe has documented the visit in the attached note.    Mario Wallace MD  [facility] Family Professor  Oncology and Adult Reconstructive Surgery  Dept Orthopaedic Surgery, [facility]  [phone] office, [phone] pager  [email or web address]        KOOS Knee Survey Assessment    Knee Outcome Survey ADL Scale (Miley, NARCISA; George, L; Latrice, RS; Charlie, FH; Caitlin, ASHLEY; 1998) 11/20/2020   Do you have swelling in your knee? Sometimes   Do you feel grinding, hear clicking or any other type of noise when your knee moves? Sometimes   Does your knee catch or hang up when moving? Never   Can you straighten your knee fully? Often   Can you bend your knee  fully? Never   How severe is your knee joint stiffness after first wakening in the morning? Severe   How severe is your knee stiffness after sitting, lying or resting LATER IN THE DAY? Moderate   How often do you experience knee pain? Always   Twisting/pivoting on your knee Severe   Straightening knee fully Moderate   Bending knee fully Severe   Walking on flat surface Mild   Going up or down stairs Moderate   At night while in bed Severe   Sitting or lying Moderate   Standing upright Mild   Descending stairs Moderate   Ascending stairs Moderate   Rising from sitting Mild   Standing Mild   Bending to floor/ an object Mild   Walking on flat surface Mild   Getting in/out of car Moderate   Going shopping Moderate   Putting on socks/stockings Moderate   Rising from bed Mild   Taking off socks/stockings Moderate   Lying in bed (turning over, maintaining knee position) Moderate   Getting in/out of bath Extreme   Sitting Moderate   Getting on/off toilet Mild   Heavy domestic duties (moving heavy boxes, scrubbing floors, etc) Extreme   Light domestic duties (cooking, dusting, etc) Moderate   Squatting Extreme   Running Extreme   Jumping Extreme   Twisting/pivoting on your injured knee Extreme   Kneeling Extreme   How often are you aware of your knee problem? Constantly   Have you modified your life style to avoid potentially damaging activities to your knee? Totally   How much are you troubled with lack of confidence in your knee? Moderately   In general, how much difficulty do you have with your knee? Moderate   Pain Score 41.66   Symptoms Score 57.14   Function, Daily Living Score 52.94   Sports and Rec Score 0   Quality of Life Score 25      Answers for HPI/ROS submitted by the patient on 11/20/2020   General Symptoms: Yes  Skin Symptoms: No  HENT Symptoms: Yes  EYE SYMPTOMS: No  HEART SYMPTOMS: No  LUNG SYMPTOMS: No  INTESTINAL SYMPTOMS: Yes  URINARY SYMPTOMS: No  GYNECOLOGIC SYMPTOMS: No  BREAST SYMPTOMS:  No  SKELETAL SYMPTOMS: Yes  BLOOD SYMPTOMS: Yes  NERVOUS SYSTEM SYMPTOMS: No  MENTAL HEALTH SYMPTOMS: Yes  Fever: Yes  Loss of appetite: Yes  Weight loss: Yes  Weight gain: No  Fatigue: Yes  Night sweats: Yes  Chills: Yes  Increased stress: Yes  Excessive hunger: No  Excessive thirst: No  Feeling hot or cold when others believe the temperature is normal: Yes  Loss of height: No  Post-operative complications: Yes  Surgical site pain: Yes  Hallucinations: No  Change in or Loss of Energy: Yes  Hyperactivity: No  Confusion: No  Ear pain: No  Ear discharge: No  Hearing loss: No  Tinnitus: No  Nosebleeds: No  Congestion: Yes  Sinus pain: Yes  Trouble swallowing: No   Voice hoarseness: No  Mouth sores: No  Sore throat: No  Tooth pain: No  Gum tenderness: No  Bleeding gums: No  Change in taste: No  Change in sense of smell: No  Dry mouth: Yes  Hearing aid used: No  Neck lump: No  Heart burn or indigestion: No  Nausea: No  Vomiting: No  Abdominal pain: No  Bloating: No  Constipation: No  Diarrhea: Yes  Blood in stool: No  Black stools: No  Rectal or Anal pain: No  Fecal incontinence: No  Yellowing of skin or eyes: No  Vomit with blood: No  Change in stools: No  Back pain: No  Muscle aches: No  Neck pain: No  Swollen joints: Yes  Joint pain: Yes  Bone pain: No  Muscle cramps: No  Muscle weakness: Yes  Joint stiffness: Yes  Bone fracture: No  Anemia: No  Swollen glands: No  Easy bleeding or bruising: No  Edema or swelling: Yes  Nervous or Anxious: Yes  Depression: Yes  Trouble sleeping: Yes  Trouble thinking or concentrating: No  Mood changes: No  Panic attacks: No

## 2020-11-23 NOTE — TELEPHONE ENCOUNTER
Pt's platelet count low at 12. Pt had recent knee replacement. Per patient only change is taking 324 mg ASA after knee replacement per DR Wallace. Per DR LEventhal patient to repeat CBC on wed or Friday this week. Pt aware and will call to make lab appt.

## 2020-11-23 NOTE — PROGRESS NOTES
Postoperative knee replacement follow-up clinic visit  DOS: 10/23/2020    Surgery: Right total knee replacement    Nicci Pemberton is doing well after last surgery listed above.    Preoperative knee pain is  Still Present  Analgesic medication: Tylenol only.    Patient reports persistent right knee pain.  Has been working diligently with therapy.  She states that her pain is much different than before surgery.  She describes this is a surgical type pain.  She reports significant stiffness that is maintaining.  She feels as though this is getting slightly better over time, but not significantly.  Her physical therapist last measured her for knee flexion at 70 degrees and her extension at 2 degrees shy of full.  She denies fevers or chills and she has been off antibiotics since that she was admitted for 48 hours on vancomycin.  She reports that the swelling and redness in her knee has significantly improved.  She has not fallen or had any subsequent trauma to the knee.  No significant increase in knee pain.      EXAM:  Incision healing, clean and dry.  Joint range of motion 3-82 degrees, measured with goniometer, pain free  Effusion: none  Periarticular swelling or leg edema: Minimal.  Photograph attached to chart review and media  Peroneal and tibial nerve function: Intact  Gait: Antalgic with a walker.    IMAGING:  Radiographs demonstrate the knee implant in satisfactory position without evidence of any complication.    IMPRESSION:  Stiffness following right total knee replacement.  Slowly progressing.  She is now 4 weeks postop.    We discussed the role for manipulation under anesthesia.  We would ideally like to see her at around 90 degrees at this point.  We discussed doing a manipulation at this point versus giving her an additional 2 to 3 weeks of physical therapy to regain her motion.  She wished to proceed with continued aggressive physical therapy for the next 2 weeks.  If she is still not at 90 degrees of  flexion after 2 weeks, she will reach out to us and we will schedule her for a manipulation under anesthesia.    Otherwise, continue Tylenol and other conservative therapies including compression, ice, heat for her pain..     PLAN:    Continue range of motion exercises and stretching.    Quadriceps strengthening exercises.    May be weight bearing as tolerated.    Discontinue DVT prophylaxis meds (i.e., warfarin or ASA) if not required for any other reason.  She will return to her previous dose of 81 mg ASA today.    Next follow-up visit at 3 months after surgery or sooner as needed.     The patient was seen and discussed with Dr. Wallace, who is agreement the above assessment and plan.    Angelo Amezquita MD  Orthopaedic Surgery PGY-4  #: [phone]      Attending MD (Dr. Mario Wallace) Attestation :  This patient was seen and evaluated by me including a history, exam, and interpretation of all imaging and/or lab data.  Either a training physician (resident/fellow), who also saw the patient, or scribe has documented the visit in the attached note.    Mario Wallace MD  [facility] Family Professor  Oncology and Adult Reconstructive Surgery  Dept Orthopaedic Surgery, [facility]  [phone] office, [phone] pager  [email or web address]        KOOS Knee Survey Assessment    Knee Outcome Survey ADL Scale (Miley, NARCISA; George, L; Latrice, RS; Charlie, FH; Caitlin, CD; 1998) 11/20/2020   Do you have swelling in your knee? Sometimes   Do you feel grinding, hear clicking or any other type of noise when your knee moves? Sometimes   Does your knee catch or hang up when moving? Never   Can you straighten your knee fully? Often   Can you bend your knee fully? Never   How severe is your knee joint stiffness after first wakening in the morning? Severe   How severe is your knee stiffness after sitting, lying or resting LATER IN THE DAY? Moderate   How often do you experience knee pain? Always   Twisting/pivoting on your  knee Severe   Straightening knee fully Moderate   Bending knee fully Severe   Walking on flat surface Mild   Going up or down stairs Moderate   At night while in bed Severe   Sitting or lying Moderate   Standing upright Mild   Descending stairs Moderate   Ascending stairs Moderate   Rising from sitting Mild   Standing Mild   Bending to floor/ an object Mild   Walking on flat surface Mild   Getting in/out of car Moderate   Going shopping Moderate   Putting on socks/stockings Moderate   Rising from bed Mild   Taking off socks/stockings Moderate   Lying in bed (turning over, maintaining knee position) Moderate   Getting in/out of bath Extreme   Sitting Moderate   Getting on/off toilet Mild   Heavy domestic duties (moving heavy boxes, scrubbing floors, etc) Extreme   Light domestic duties (cooking, dusting, etc) Moderate   Squatting Extreme   Running Extreme   Jumping Extreme   Twisting/pivoting on your injured knee Extreme   Kneeling Extreme   How often are you aware of your knee problem? Constantly   Have you modified your life style to avoid potentially damaging activities to your knee? Totally   How much are you troubled with lack of confidence in your knee? Moderately   In general, how much difficulty do you have with your knee? Moderate   Pain Score 41.66   Symptoms Score 57.14   Function, Daily Living Score 52.94   Sports and Rec Score 0   Quality of Life Score 25          Answers for HPI/ROS submitted by the patient on 11/20/2020   General Symptoms: Yes  Skin Symptoms: No  HENT Symptoms: Yes  EYE SYMPTOMS: No  HEART SYMPTOMS: No  LUNG SYMPTOMS: No  INTESTINAL SYMPTOMS: Yes  URINARY SYMPTOMS: No  GYNECOLOGIC SYMPTOMS: No  BREAST SYMPTOMS: No  SKELETAL SYMPTOMS: Yes  BLOOD SYMPTOMS: Yes  NERVOUS SYSTEM SYMPTOMS: No  MENTAL HEALTH SYMPTOMS: Yes  Fever: Yes  Loss of appetite: Yes  Weight loss: Yes  Weight gain: No  Fatigue: Yes  Night sweats: Yes  Chills: Yes  Increased stress: Yes  Excessive hunger:  No  Excessive thirst: No  Feeling hot or cold when others believe the temperature is normal: Yes  Loss of height: No  Post-operative complications: Yes  Surgical site pain: Yes  Hallucinations: No  Change in or Loss of Energy: Yes  Hyperactivity: No  Confusion: No  Ear pain: No  Ear discharge: No  Hearing loss: No  Tinnitus: No  Nosebleeds: No  Congestion: Yes  Sinus pain: Yes  Trouble swallowing: No   Voice hoarseness: No  Mouth sores: No  Sore throat: No  Tooth pain: No  Gum tenderness: No  Bleeding gums: No  Change in taste: No  Change in sense of smell: No  Dry mouth: Yes  Hearing aid used: No  Neck lump: No  Heart burn or indigestion: No  Nausea: No  Vomiting: No  Abdominal pain: No  Bloating: No  Constipation: No  Diarrhea: Yes  Blood in stool: No  Black stools: No  Rectal or Anal pain: No  Fecal incontinence: No  Yellowing of skin or eyes: No  Vomit with blood: No  Change in stools: No  Back pain: No  Muscle aches: No  Neck pain: No  Swollen joints: Yes  Joint pain: Yes  Bone pain: No  Muscle cramps: No  Muscle weakness: Yes  Joint stiffness: Yes  Bone fracture: No  Anemia: No  Swollen glands: No  Easy bleeding or bruising: No  Edema or swelling: Yes  Nervous or Anxious: Yes  Depression: Yes  Trouble sleeping: Yes  Trouble thinking or concentrating: No  Mood changes: No  Panic attacks: No

## 2020-11-23 NOTE — NURSING NOTE
Chief Complaint   Patient presents with     Surgical Followup     4wk POP F/u w/XR S/p Right  total knee arthroplasty - Right DOS: 10/23/2020       60 year old  1960        [id number] - [place], [place] - [address] [id number] - [place], [place] - [address]     Allergies   Allergen Reactions     Ciprofloxacin Dizziness and Nausea     Food      Fruits with cores and pits  Apples     Sulfa Drugs Unknown     Swollen joints     Furosemide Rash       Current Outpatient Medications   Medication     acetaminophen (TYLENOL) 325 MG tablet     aspirin (ASA) 81 MG EC tablet     Cholecalciferol (VITAMIN D-3) 25 MCG (1000 UT) CAPS     cycloSPORINE modified (GENERIC EQUIVALENT) 25 MG capsule     famotidine (PEPCID) 20 MG tablet     levothyroxine (SYNTHROID/LEVOTHROID) 125 MCG tablet     Omega-3 Fatty Acids (FISH OIL) 1200 MG capsule     oxyCODONE (ROXICODONE) 5 MG tablet     SENEXON-S 8.6-50 MG tablet     No current facility-administered medications for this visit.            Questionnaires:            KOOS Knee Survey Assessment    Knee Outcome Survey ADL Scale (Miley, NARCISA; George, L; Latrice, RS; Charlie, FH; Caitlin, ASHLEY; 1998) 11/20/2020   Do you have swelling in your knee? Sometimes   Do you feel grinding, hear clicking or any other type of noise when your knee moves? Sometimes   Does your knee catch or hang up when moving? Never   Can you straighten your knee fully? Often   Can you bend your knee fully? Never   How severe is your knee joint stiffness after first wakening in the morning? Severe   How severe is your knee stiffness after sitting, lying or resting LATER IN THE DAY? Moderate   How often do you experience knee pain? Always   Twisting/pivoting on your knee Severe   Straightening knee fully Moderate   Bending knee fully Severe   Walking on flat surface Mild   Going up or down stairs Moderate   At night while in bed Severe   Sitting or lying Moderate   Standing upright Mild    Descending stairs Moderate   Ascending stairs Moderate   Rising from sitting Mild   Standing Mild   Bending to floor/ an object Mild   Walking on flat surface Mild   Getting in/out of car Moderate   Going shopping Moderate   Putting on socks/stockings Moderate   Rising from bed Mild   Taking off socks/stockings Moderate   Lying in bed (turning over, maintaining knee position) Moderate   Getting in/out of bath Extreme   Sitting Moderate   Getting on/off toilet Mild   Heavy domestic duties (moving heavy boxes, scrubbing floors, etc) Extreme   Light domestic duties (cooking, dusting, etc) Moderate   Squatting Extreme   Running Extreme   Jumping Extreme   Twisting/pivoting on your injured knee Extreme   Kneeling Extreme   How often are you aware of your knee problem? Constantly   Have you modified your life style to avoid potentially damaging activities to your knee? Totally   How much are you troubled with lack of confidence in your knee? Moderately   In general, how much difficulty do you have with your knee? Moderate   Pain Score 41.66   Symptoms Score 57.14   Function, Daily Living Score 52.94   Sports and Rec Score 0   Quality of Life Score 25              Promis 10 Assessment    PROMIS 10 11/20/2020   In general, would you say your health is: Good   In general, would you say your quality of life is: Good   In general, how would you rate your physical health? Good   In general, how would you rate your mental health, including your mood and your ability to think? Good   In general, how would you rate your satisfaction with your social activities and relationships? Very good   In general, please rate how well you carry out your usual social activities and roles Good   To what extent are you able to carry out your everyday physical activities such as walking, climbing stairs, carrying groceries, or moving a chair? Moderately   How often have you been bothered by emotional problems such as feeling anxious,  depressed or irritable? Sometimes   How would you rate your fatigue on average? Moderate   How would you rate your pain on average?   0 = No Pain  to  10 = Worst Imaginable Pain 5   In general, would you say your health is: 3   In general, would you say your quality of life is: 3   In general, how would you rate your physical health? 3   In general, how would you rate your mental health, including your mood and your ability to think? 3   In general, how would you rate your satisfaction with your social activities and relationships? 4   In general, please rate how well you carry out your usual social activities and roles. (This includes activities at home, at work and in your community, and responsibilities as a parent, child, spouse, employee, friend, etc.) 3   To what extent are you able to carry out your everyday physical activities such as walking, climbing stairs, carrying groceries, or moving a chair? 3   In the past 7 days, how often have you been bothered by emotional problems such as feeling anxious, depressed, or irritable? 3   In the past 7 days, how would you rate your fatigue on average? 3   In the past 7 days, how would you rate your pain on average, where 0 means no pain, and 10 means worst imaginable pain? 5   Global Mental Health Score 13   Global Physical Health Score 12   PROMIS TOTAL - SUBSCORES 25   Some recent data might be hidden

## 2020-11-24 LAB
ERYTHROCYTE [DISTWIDTH] IN BLOOD BY AUTOMATED COUNT: 12.5 % (ref 10–15)
HCT VFR BLD AUTO: 33.5 % (ref 35–47)
HGB BLD-MCNC: 10.7 G/DL (ref 11.7–15.7)
MCH RBC QN AUTO: 31.8 PG (ref 26.5–33)
MCHC RBC AUTO-ENTMCNC: 31.9 G/DL (ref 31.5–36.5)
MCV RBC AUTO: 100 FL (ref 78–100)
PLATELET # BLD AUTO: 12 10E9/L (ref 150–450)
RBC # BLD AUTO: 3.36 10E12/L (ref 3.8–5.2)
WBC # BLD AUTO: 5 10E9/L (ref 4–11)

## 2020-11-25 ENCOUNTER — REFERRAL (OUTPATIENT)
Dept: TRANSPLANT | Facility: CLINIC | Age: 60
End: 2020-11-25

## 2020-11-25 DIAGNOSIS — D69.1 PLATELET DYSFUNCTION (H): Primary | ICD-10-CM

## 2020-11-25 DIAGNOSIS — Z94.4 LIVER REPLACED BY TRANSPLANT (H): ICD-10-CM

## 2020-11-25 LAB
ERYTHROCYTE [DISTWIDTH] IN BLOOD BY AUTOMATED COUNT: 12.4 % (ref 10–15)
HCT VFR BLD AUTO: 33.6 % (ref 35–47)
HGB BLD-MCNC: 11.2 G/DL (ref 11.7–15.7)
MCH RBC QN AUTO: 32.7 PG (ref 26.5–33)
MCHC RBC AUTO-ENTMCNC: 33.3 G/DL (ref 31.5–36.5)
MCV RBC AUTO: 98 FL (ref 78–100)
PLATELET # BLD AUTO: 10 10E9/L (ref 150–450)
RBC # BLD AUTO: 3.43 10E12/L (ref 3.8–5.2)
WBC # BLD AUTO: 4.9 10E9/L (ref 4–11)

## 2020-11-25 PROCEDURE — 85027 COMPLETE CBC AUTOMATED: CPT | Performed by: INTERNAL MEDICINE

## 2020-11-25 PROCEDURE — 36415 COLL VENOUS BLD VENIPUNCTURE: CPT | Performed by: INTERNAL MEDICINE

## 2020-11-25 NOTE — TELEPHONE ENCOUNTER
Per DR Reddy, heme referral for low platelet count. Discussed with patient. Pt has new bruising on hand quarter size bruise between thumb and index finger. Nicci is unaware how she developed bruise. No rashes.

## 2020-11-25 NOTE — TELEPHONE ENCOUNTER
DATE:  11/25/2020   TIME OF RECEIPT FROM LAB:  1138  LAB TEST:  Platelet   LAB VALUE:  10.0  RESULTS GIVEN WITH READ-BACK TO (PROVIDER):  Zina Dunham RN   TIME LAB VALUE REPORTED TO PROVIDER:   1139    JIMBO Manjarrez, LPN   Solid Organ Transplant

## 2020-11-30 ENCOUNTER — TELEPHONE (OUTPATIENT)
Dept: GASTROENTEROLOGY | Facility: CLINIC | Age: 60
End: 2020-11-30

## 2020-11-30 ENCOUNTER — TELEPHONE (OUTPATIENT)
Dept: ORTHOPEDICS | Facility: CLINIC | Age: 60
End: 2020-11-30

## 2020-11-30 ENCOUNTER — TELEPHONE (OUTPATIENT)
Dept: TRANSPLANT | Facility: CLINIC | Age: 60
End: 2020-11-30

## 2020-11-30 DIAGNOSIS — D69.6 THROMBOCYTOPENIA (H): ICD-10-CM

## 2020-11-30 DIAGNOSIS — Z94.4 STATUS POST LIVER TRANSPLANTATION (H): Primary | ICD-10-CM

## 2020-11-30 DIAGNOSIS — D69.6 THROMBOCYTOPENIA (H): Primary | ICD-10-CM

## 2020-11-30 DIAGNOSIS — D69.1 PLATELET DYSFUNCTION (H): ICD-10-CM

## 2020-11-30 LAB
BASOPHILS # BLD AUTO: 0 10E9/L (ref 0–0.2)
BASOPHILS NFR BLD AUTO: 0.4 %
DIFFERENTIAL METHOD BLD: ABNORMAL
EOSINOPHIL # BLD AUTO: 0.3 10E9/L (ref 0–0.7)
EOSINOPHIL NFR BLD AUTO: 7.3 %
ERYTHROCYTE [DISTWIDTH] IN BLOOD BY AUTOMATED COUNT: 12.4 % (ref 10–15)
HCT VFR BLD AUTO: 34.9 % (ref 35–47)
HGB BLD-MCNC: 11.4 G/DL (ref 11.7–15.7)
IMM GRANULOCYTES # BLD: 0 10E9/L (ref 0–0.4)
IMM GRANULOCYTES NFR BLD: 0.2 %
LDH SERPL L TO P-CCNC: 211 U/L (ref 81–234)
LYMPHOCYTES # BLD AUTO: 1 10E9/L (ref 0.8–5.3)
LYMPHOCYTES NFR BLD AUTO: 21.1 %
MCH RBC QN AUTO: 32.7 PG (ref 26.5–33)
MCHC RBC AUTO-ENTMCNC: 32.7 G/DL (ref 31.5–36.5)
MCV RBC AUTO: 100 FL (ref 78–100)
MONOCYTES # BLD AUTO: 0.5 10E9/L (ref 0–1.3)
MONOCYTES NFR BLD AUTO: 11.6 %
NEUTROPHILS # BLD AUTO: 2.8 10E9/L (ref 1.6–8.3)
NEUTROPHILS NFR BLD AUTO: 59.4 %
NRBC # BLD AUTO: 0 10*3/UL
NRBC BLD AUTO-RTO: 0 /100
PLATELET # BLD AUTO: 19 10E9/L (ref 150–450)
PLATELET # BLD EST: ABNORMAL 10*3/UL
RBC # BLD AUTO: 3.49 10E12/L (ref 3.8–5.2)
RBC MORPH BLD: NORMAL
RETICS # AUTO: 53.6 10E9/L (ref 25–95)
RETICS/RBC NFR AUTO: 1.5 % (ref 0.5–2)
WBC # BLD AUTO: 4.6 10E9/L (ref 4–11)

## 2020-11-30 PROCEDURE — 85045 AUTOMATED RETICULOCYTE COUNT: CPT | Performed by: INTERNAL MEDICINE

## 2020-11-30 PROCEDURE — 85025 COMPLETE CBC W/AUTO DIFF WBC: CPT | Performed by: INTERNAL MEDICINE

## 2020-11-30 PROCEDURE — 83010 ASSAY OF HAPTOGLOBIN QUANT: CPT | Performed by: INTERNAL MEDICINE

## 2020-11-30 PROCEDURE — 83615 LACTATE (LD) (LDH) ENZYME: CPT | Performed by: INTERNAL MEDICINE

## 2020-11-30 RX ORDER — DEXAMETHASONE 4 MG/1
40 TABLET ORAL DAILY
Qty: 40 TABLET | Refills: 0 | Status: ON HOLD | OUTPATIENT
Start: 2020-11-30 | End: 2020-12-11

## 2020-11-30 NOTE — TELEPHONE ENCOUNTER
Spoke with Nicci regarding plan to get labs today. Start decadron 40 mg daily x 4 days. Repeat CBC on Thursday.     Pt has no petechiae rash, no new bruising or any signs of bleeding. Patient aware with any bleeding needs to go to the ER immediately.   Pt reports that she is being very careful.     Pt will call back with any changes or trouble getting labs or decadron today.

## 2020-11-30 NOTE — TELEPHONE ENCOUNTER
Health Call Center    Phone Message    May a detailed message be left on voicemail: no     Reason for Call: Other: Shaila calling to request a call back due to a critical lab result. Please call Shaila at your earliest convenience to emelyn. She is requesting a direct call back number.     Action Taken: Message routed to:  [facility] ([facility]):  HEPATOLOGY    Travel Screening: Not Applicable

## 2020-11-30 NOTE — PROGRESS NOTES
Spoke with hematology staff regarding acute drop in platelets.    PLAN:  - hemolysis/DIC labs ordered for today  - Plan to start on dexamethasone 40mg daily x 4 days  - repeat CBC on Thursday  - Hematology is following along.  - Pt informed if ANY bleeding, she is to present to hospital emergently    Thomas M. Leventhal, M.D.   of Medicine  Advanced/Transplant Hepatology & Critical Care Medicine  [facility]

## 2020-11-30 NOTE — TELEPHONE ENCOUNTER
DATE:  11/30/2020   TIME OF RECEIPT FROM LAB:  4:25p  LAB TEST:  platelet  LAB VALUE:  17  RESULTS GIVEN WITH READ-BACK TO (PROVIDER):  Zina Dunham  TIME LAB VALUE REPORTED TO PROVIDER:   4:25

## 2020-11-30 NOTE — TELEPHONE ENCOUNTER
Message to dr leventhal.    Pt did report that she stopped taking ASA. Dr rojas wanted her to  Cut back to 181 mg,but marc Mahmood, instructed her to stop. So she did stop taking ASA.

## 2020-11-30 NOTE — TELEPHONE ENCOUNTER
RN received a call from patient. Discussed extensively about physical therapy and low platelet count. Patient has a follow up with hematology to check on that  Patient will resume PT starting tomorrow and will keep RN posted.    Colten Avila RN

## 2020-12-01 ENCOUNTER — HOSPITAL ENCOUNTER (OUTPATIENT)
Dept: PHYSICAL THERAPY | Facility: CLINIC | Age: 60
Setting detail: THERAPIES SERIES
End: 2020-12-01
Attending: ORTHOPAEDIC SURGERY
Payer: COMMERCIAL

## 2020-12-01 LAB
COPATH REPORT: NORMAL
HAPTOGLOB SERPL-MCNC: 98 MG/DL (ref 32–197)

## 2020-12-01 PROCEDURE — 97110 THERAPEUTIC EXERCISES: CPT | Mod: GP | Performed by: PHYSICAL MEDICINE & REHABILITATION

## 2020-12-01 PROCEDURE — 97140 MANUAL THERAPY 1/> REGIONS: CPT | Mod: GP | Performed by: PHYSICAL MEDICINE & REHABILITATION

## 2020-12-03 ENCOUNTER — HOSPITAL ENCOUNTER (OUTPATIENT)
Dept: PHYSICAL THERAPY | Facility: CLINIC | Age: 60
Setting detail: THERAPIES SERIES
End: 2020-12-03
Attending: ORTHOPAEDIC SURGERY
Payer: COMMERCIAL

## 2020-12-03 DIAGNOSIS — D69.6 THROMBOCYTOPENIA (H): ICD-10-CM

## 2020-12-03 LAB
BASOPHILS # BLD AUTO: 0 10E9/L (ref 0–0.2)
BASOPHILS NFR BLD AUTO: 0.1 %
DIFFERENTIAL METHOD BLD: ABNORMAL
EOSINOPHIL # BLD AUTO: 0 10E9/L (ref 0–0.7)
EOSINOPHIL NFR BLD AUTO: 0 %
ERYTHROCYTE [DISTWIDTH] IN BLOOD BY AUTOMATED COUNT: 12.6 % (ref 10–15)
HCT VFR BLD AUTO: 35.7 % (ref 35–47)
HGB BLD-MCNC: 11.6 G/DL (ref 11.7–15.7)
IMM GRANULOCYTES # BLD: 0.2 10E9/L (ref 0–0.4)
IMM GRANULOCYTES NFR BLD: 1.1 %
LYMPHOCYTES # BLD AUTO: 0.4 10E9/L (ref 0.8–5.3)
LYMPHOCYTES NFR BLD AUTO: 3.1 %
MCH RBC QN AUTO: 32.6 PG (ref 26.5–33)
MCHC RBC AUTO-ENTMCNC: 32.5 G/DL (ref 31.5–36.5)
MCV RBC AUTO: 100 FL (ref 78–100)
MONOCYTES # BLD AUTO: 0.1 10E9/L (ref 0–1.3)
MONOCYTES NFR BLD AUTO: 0.8 %
NEUTROPHILS # BLD AUTO: 12.6 10E9/L (ref 1.6–8.3)
NEUTROPHILS NFR BLD AUTO: 94.9 %
NRBC # BLD AUTO: 0 10*3/UL
NRBC BLD AUTO-RTO: 0 /100
PLATELET # BLD AUTO: 76 10E9/L (ref 150–450)
RBC # BLD AUTO: 3.56 10E12/L (ref 3.8–5.2)
WBC # BLD AUTO: 13.3 10E9/L (ref 4–11)

## 2020-12-03 PROCEDURE — 97140 MANUAL THERAPY 1/> REGIONS: CPT | Mod: GP | Performed by: PHYSICAL MEDICINE & REHABILITATION

## 2020-12-03 PROCEDURE — 36415 COLL VENOUS BLD VENIPUNCTURE: CPT | Performed by: INTERNAL MEDICINE

## 2020-12-03 PROCEDURE — 85025 COMPLETE CBC W/AUTO DIFF WBC: CPT | Performed by: INTERNAL MEDICINE

## 2020-12-03 PROCEDURE — 97110 THERAPEUTIC EXERCISES: CPT | Mod: GP | Performed by: PHYSICAL MEDICINE & REHABILITATION

## 2020-12-04 ENCOUNTER — TELEPHONE (OUTPATIENT)
Dept: TRANSPLANT | Facility: CLINIC | Age: 60
End: 2020-12-04

## 2020-12-04 DIAGNOSIS — Z94.4 STATUS POST LIVER TRANSPLANTATION (H): Primary | ICD-10-CM

## 2020-12-04 DIAGNOSIS — D69.1 PLATELET DYSFUNCTION (H): ICD-10-CM

## 2020-12-04 NOTE — TELEPHONE ENCOUNTER
Spoke with patient. She has not signs of bleeding or new bruising. Pt will go to ER with any signs.  She will repeat CBC on Wednesday next week per DR austin and dr Otero discussion. Patient will call to make appt for labs. Pt aware that DR otero office will reach for a virtual appt.

## 2020-12-05 ENCOUNTER — HOSPITAL ENCOUNTER (INPATIENT)
Facility: CLINIC | Age: 60
LOS: 6 days | Discharge: HOME | DRG: 391 | End: 2020-12-11
Attending: EMERGENCY MEDICINE | Admitting: FAMILY MEDICINE
Payer: COMMERCIAL

## 2020-12-05 ENCOUNTER — TELEPHONE (OUTPATIENT)
Dept: TRANSPLANT | Facility: CLINIC | Age: 60
End: 2020-12-05

## 2020-12-05 ENCOUNTER — APPOINTMENT (OUTPATIENT)
Dept: CT IMAGING | Facility: CLINIC | Age: 60
DRG: 391 | End: 2020-12-05
Attending: EMERGENCY MEDICINE
Payer: COMMERCIAL

## 2020-12-05 DIAGNOSIS — U07.1 LAB TEST POSITIVE FOR DETECTION OF COVID-19 VIRUS: ICD-10-CM

## 2020-12-05 DIAGNOSIS — K57.32 DIVERTICULITIS OF COLON: ICD-10-CM

## 2020-12-05 DIAGNOSIS — Z94.4 STATUS POST LIVER TRANSPLANTATION (H): ICD-10-CM

## 2020-12-05 PROBLEM — Z20.822 SUSPECTED 2019 NOVEL CORONAVIRUS INFECTION: Status: ACTIVE | Noted: 2020-12-05

## 2020-12-05 LAB
ALBUMIN SERPL-MCNC: 3.2 G/DL (ref 3.4–5)
ALBUMIN UR-MCNC: NEGATIVE MG/DL
ALP SERPL-CCNC: 140 U/L (ref 40–150)
ALT SERPL W P-5'-P-CCNC: 30 U/L (ref 0–50)
ANION GAP SERPL CALCULATED.3IONS-SCNC: 4 MMOL/L (ref 3–14)
APPEARANCE UR: CLEAR
AST SERPL W P-5'-P-CCNC: 15 U/L (ref 0–45)
BASOPHILS # BLD AUTO: 0 10E9/L (ref 0–0.2)
BASOPHILS NFR BLD AUTO: 0.1 %
BILIRUB SERPL-MCNC: 2 MG/DL (ref 0.2–1.3)
BILIRUB UR QL STRIP: NEGATIVE
BUN SERPL-MCNC: 36 MG/DL (ref 7–30)
CALCIUM SERPL-MCNC: 8.7 MG/DL (ref 8.5–10.1)
CHLORIDE SERPL-SCNC: 106 MMOL/L (ref 94–109)
CO2 SERPL-SCNC: 28 MMOL/L (ref 20–32)
COLOR UR AUTO: YELLOW
CREAT SERPL-MCNC: 1.07 MG/DL (ref 0.52–1.04)
DIFFERENTIAL METHOD BLD: ABNORMAL
EOSINOPHIL # BLD AUTO: 0.1 10E9/L (ref 0–0.7)
EOSINOPHIL NFR BLD AUTO: 0.6 %
ERYTHROCYTE [DISTWIDTH] IN BLOOD BY AUTOMATED COUNT: 12.5 % (ref 10–15)
GFR SERPL CREATININE-BSD FRML MDRD: 56 ML/MIN/{1.73_M2}
GLUCOSE SERPL-MCNC: 85 MG/DL (ref 70–99)
GLUCOSE UR STRIP-MCNC: NEGATIVE MG/DL
HCT VFR BLD AUTO: 36.5 % (ref 35–47)
HGB BLD-MCNC: 12.1 G/DL (ref 11.7–15.7)
HGB UR QL STRIP: NEGATIVE
IMM GRANULOCYTES # BLD: 0.1 10E9/L (ref 0–0.4)
IMM GRANULOCYTES NFR BLD: 0.7 %
KETONES UR STRIP-MCNC: NEGATIVE MG/DL
LACTATE BLD-SCNC: 0.8 MMOL/L (ref 0.7–2)
LEUKOCYTE ESTERASE UR QL STRIP: NEGATIVE
LIPASE SERPL-CCNC: 61 U/L (ref 73–393)
LYMPHOCYTES # BLD AUTO: 0.8 10E9/L (ref 0.8–5.3)
LYMPHOCYTES NFR BLD AUTO: 7.3 %
MCH RBC QN AUTO: 32.5 PG (ref 26.5–33)
MCHC RBC AUTO-ENTMCNC: 33.2 G/DL (ref 31.5–36.5)
MCV RBC AUTO: 98 FL (ref 78–100)
MONOCYTES # BLD AUTO: 1 10E9/L (ref 0–1.3)
MONOCYTES NFR BLD AUTO: 10.1 %
NEUTROPHILS # BLD AUTO: 8.3 10E9/L (ref 1.6–8.3)
NEUTROPHILS NFR BLD AUTO: 81.2 %
NITRATE UR QL: NEGATIVE
NRBC # BLD AUTO: 0 10*3/UL
NRBC BLD AUTO-RTO: 0 /100
PH UR STRIP: 5 PH (ref 5–7)
PLATELET # BLD AUTO: 62 10E9/L (ref 150–450)
POTASSIUM SERPL-SCNC: 3.6 MMOL/L (ref 3.4–5.3)
POTASSIUM SERPL-SCNC: 4 MMOL/L (ref 3.4–5.3)
PROT SERPL-MCNC: 7.2 G/DL (ref 6.8–8.8)
RBC # BLD AUTO: 3.72 10E12/L (ref 3.8–5.2)
SODIUM SERPL-SCNC: 138 MMOL/L (ref 133–144)
SOURCE: NORMAL
SP GR UR STRIP: 1.01 (ref 1–1.03)
UROBILINOGEN UR STRIP-MCNC: 0 MG/DL (ref 0–2)
WBC # BLD AUTO: 10.3 10E9/L (ref 4–11)

## 2020-12-05 PROCEDURE — 99285 EMERGENCY DEPT VISIT HI MDM: CPT | Mod: 25 | Performed by: EMERGENCY MEDICINE

## 2020-12-05 PROCEDURE — 250N000011 HC RX IP 250 OP 636: Performed by: EMERGENCY MEDICINE

## 2020-12-05 PROCEDURE — 74177 CT ABD & PELVIS W/CONTRAST: CPT

## 2020-12-05 PROCEDURE — 83605 ASSAY OF LACTIC ACID: CPT | Performed by: EMERGENCY MEDICINE

## 2020-12-05 PROCEDURE — 81003 URINALYSIS AUTO W/O SCOPE: CPT | Performed by: EMERGENCY MEDICINE

## 2020-12-05 PROCEDURE — 120N000001 HC R&B MED SURG/OB

## 2020-12-05 PROCEDURE — 96361 HYDRATE IV INFUSION ADD-ON: CPT | Performed by: EMERGENCY MEDICINE

## 2020-12-05 PROCEDURE — 96367 TX/PROPH/DG ADDL SEQ IV INF: CPT | Performed by: EMERGENCY MEDICINE

## 2020-12-05 PROCEDURE — 250N000012 HC RX MED GY IP 250 OP 636 PS 637: Performed by: FAMILY MEDICINE

## 2020-12-05 PROCEDURE — 96375 TX/PRO/DX INJ NEW DRUG ADDON: CPT | Performed by: EMERGENCY MEDICINE

## 2020-12-05 PROCEDURE — 85025 COMPLETE CBC W/AUTO DIFF WBC: CPT | Performed by: EMERGENCY MEDICINE

## 2020-12-05 PROCEDURE — 84132 ASSAY OF SERUM POTASSIUM: CPT | Performed by: FAMILY MEDICINE

## 2020-12-05 PROCEDURE — C9803 HOPD COVID-19 SPEC COLLECT: HCPCS | Performed by: EMERGENCY MEDICINE

## 2020-12-05 PROCEDURE — 258N000003 HC RX IP 258 OP 636: Performed by: EMERGENCY MEDICINE

## 2020-12-05 PROCEDURE — 99285 EMERGENCY DEPT VISIT HI MDM: CPT | Performed by: EMERGENCY MEDICINE

## 2020-12-05 PROCEDURE — U0003 INFECTIOUS AGENT DETECTION BY NUCLEIC ACID (DNA OR RNA); SEVERE ACUTE RESPIRATORY SYNDROME CORONAVIRUS 2 (SARS-COV-2) (CORONAVIRUS DISEASE [COVID-19]), AMPLIFIED PROBE TECHNIQUE, MAKING USE OF HIGH THROUGHPUT TECHNOLOGIES AS DESCRIBED BY CMS-2020-01-R: HCPCS | Performed by: EMERGENCY MEDICINE

## 2020-12-05 PROCEDURE — 36415 COLL VENOUS BLD VENIPUNCTURE: CPT | Performed by: FAMILY MEDICINE

## 2020-12-05 PROCEDURE — 83690 ASSAY OF LIPASE: CPT | Performed by: EMERGENCY MEDICINE

## 2020-12-05 PROCEDURE — 96365 THER/PROPH/DIAG IV INF INIT: CPT | Performed by: EMERGENCY MEDICINE

## 2020-12-05 PROCEDURE — 80053 COMPREHEN METABOLIC PANEL: CPT | Performed by: EMERGENCY MEDICINE

## 2020-12-05 PROCEDURE — 93005 ELECTROCARDIOGRAM TRACING: CPT | Performed by: EMERGENCY MEDICINE

## 2020-12-05 RX ORDER — AMOXICILLIN 250 MG
2 CAPSULE ORAL 2 TIMES DAILY
Status: DISCONTINUED | OUTPATIENT
Start: 2020-12-06 | End: 2020-12-06

## 2020-12-05 RX ORDER — HYDROMORPHONE HYDROCHLORIDE 1 MG/ML
0.5 INJECTION, SOLUTION INTRAMUSCULAR; INTRAVENOUS; SUBCUTANEOUS ONCE
Status: COMPLETED | OUTPATIENT
Start: 2020-12-05 | End: 2020-12-05

## 2020-12-05 RX ORDER — CYCLOSPORINE 25 MG/1
75 CAPSULE, LIQUID FILLED ORAL 2 TIMES DAILY
Status: DISCONTINUED | OUTPATIENT
Start: 2020-12-06 | End: 2020-12-11 | Stop reason: HOSPADM

## 2020-12-05 RX ORDER — CEFTRIAXONE SODIUM 1 G/50ML
1 INJECTION, SOLUTION INTRAVENOUS ONCE
Status: COMPLETED | OUTPATIENT
Start: 2020-12-05 | End: 2020-12-05

## 2020-12-05 RX ORDER — FAMOTIDINE 20 MG/1
20 TABLET, FILM COATED ORAL 2 TIMES DAILY
Status: DISCONTINUED | OUTPATIENT
Start: 2020-12-06 | End: 2020-12-11 | Stop reason: HOSPADM

## 2020-12-05 RX ORDER — LEVOTHYROXINE SODIUM 125 UG/1
125 TABLET ORAL DAILY
Status: DISCONTINUED | OUTPATIENT
Start: 2020-12-06 | End: 2020-12-11 | Stop reason: HOSPADM

## 2020-12-05 RX ORDER — POLYETHYLENE GLYCOL 3350 17 G/17G
17 POWDER, FOR SOLUTION ORAL DAILY
Status: DISCONTINUED | OUTPATIENT
Start: 2020-12-06 | End: 2020-12-06

## 2020-12-05 RX ORDER — IOPAMIDOL 755 MG/ML
94 INJECTION, SOLUTION INTRAVASCULAR ONCE
Status: COMPLETED | OUTPATIENT
Start: 2020-12-05 | End: 2020-12-05

## 2020-12-05 RX ORDER — AMOXICILLIN 250 MG
1 CAPSULE ORAL 2 TIMES DAILY
Status: DISCONTINUED | OUTPATIENT
Start: 2020-12-06 | End: 2020-12-06

## 2020-12-05 RX ORDER — LIDOCAINE 40 MG/G
CREAM TOPICAL
Status: DISCONTINUED | OUTPATIENT
Start: 2020-12-05 | End: 2020-12-11 | Stop reason: HOSPADM

## 2020-12-05 RX ADMIN — IOPAMIDOL 94 ML: 755 INJECTION, SOLUTION INTRAVENOUS at 19:44

## 2020-12-05 RX ADMIN — CEFTRIAXONE SODIUM 1 G: 1 INJECTION, SOLUTION INTRAVENOUS at 20:37

## 2020-12-05 RX ADMIN — METRONIDAZOLE 500 MG: 500 INJECTION, SOLUTION INTRAVENOUS at 21:22

## 2020-12-05 RX ADMIN — HYDROMORPHONE HYDROCHLORIDE 0.5 MG: 1 INJECTION, SOLUTION INTRAMUSCULAR; INTRAVENOUS; SUBCUTANEOUS at 22:41

## 2020-12-05 RX ADMIN — SODIUM CHLORIDE 1000 ML: 9 INJECTION, SOLUTION INTRAVENOUS at 18:46

## 2020-12-05 RX ADMIN — CYCLOSPORINE 75 MG: 25 CAPSULE, LIQUID FILLED ORAL at 23:35

## 2020-12-05 RX ADMIN — HYDROMORPHONE HYDROCHLORIDE 0.5 MG: 1 INJECTION, SOLUTION INTRAMUSCULAR; INTRAVENOUS; SUBCUTANEOUS at 20:38

## 2020-12-05 ASSESSMENT — ACTIVITIES OF DAILY LIVING (ADL)
FALL_HISTORY_WITHIN_LAST_SIX_MONTHS: NO
PATIENT_/_FAMILY_COMMUNICATION_STYLE: SPOKEN LANGUAGE (ENGLISH OR BILINGUAL)
DRESSING/BATHING_DIFFICULTY: NO
WEAR_GLASSES_OR_BLIND: NO
EQUIPMENT_CURRENTLY_USED_AT_HOME: CANE, STRAIGHT;WALKER, STANDARD
HEARING_DIFFICULTY_OR_DEAF: NO
TOILETING_ISSUES: NO
DOING_ERRANDS_INDEPENDENTLY_DIFFICULTY: NO
DIFFICULTY_EATING/SWALLOWING: NO
DIFFICULTY_COMMUNICATING: NO
WALKING_OR_CLIMBING_STAIRS_DIFFICULTY: NO

## 2020-12-05 ASSESSMENT — MIFFLIN-ST. JEOR
SCORE: 1421.37
SCORE: 1448.38

## 2020-12-05 NOTE — TELEPHONE ENCOUNTER
Nicci called to report that she is having horrible pelvic abdominal pain and pressure and temp 99.6. pt had recent low platelet episode and was treated with decadron and has low threshold for ER visit. Pt did respond to steroids with platelet count.     Heating pad she cannot feel pain. As soon as she moves the pain worsens. Started yesterday afternoon. Did have some relief overnight with bowel movements. Pt is having soft stools, but not diarrhea. No blood.  Yellow orange in color started at 3am and lasted a few hours and no bowel movements since. Appetite is ok.  Pt having a little pressure relief after urination as well. no visible blood in urine or stool. Pt did feel cold, but checked temp and it was 99.6. Pt prefers not to go to the ER. Spoke with Dr Garcia and patient to go to the ER for evaluation. Updated patient. She is at her cabin home and prefers to stop at [facility] instead of [place]. Updated DR garcia. Pt to call back with any trouble or concerns.

## 2020-12-06 PROBLEM — K57.32 DIVERTICULITIS OF COLON: Chronic | Status: ACTIVE | Noted: 2020-12-05

## 2020-12-06 LAB
ALBUMIN SERPL-MCNC: 2.7 G/DL (ref 3.4–5)
ALP SERPL-CCNC: 125 U/L (ref 40–150)
ALT SERPL W P-5'-P-CCNC: 24 U/L (ref 0–50)
ANION GAP SERPL CALCULATED.3IONS-SCNC: 5 MMOL/L (ref 3–14)
APTT PPP: 36 SEC (ref 22–37)
AST SERPL W P-5'-P-CCNC: 19 U/L (ref 0–45)
BASOPHILS # BLD AUTO: 0 10E9/L (ref 0–0.2)
BASOPHILS NFR BLD AUTO: 0.1 %
BILIRUB SERPL-MCNC: 1.7 MG/DL (ref 0.2–1.3)
BUN SERPL-MCNC: 25 MG/DL (ref 7–30)
CALCIUM SERPL-MCNC: 8.5 MG/DL (ref 8.5–10.1)
CHLORIDE SERPL-SCNC: 103 MMOL/L (ref 94–109)
CK SERPL-CCNC: 21 U/L (ref 30–225)
CO2 SERPL-SCNC: 29 MMOL/L (ref 20–32)
CREAT SERPL-MCNC: 0.98 MG/DL (ref 0.52–1.04)
CRP SERPL-MCNC: 119 MG/L (ref 0–8)
D DIMER PPP FEU-MCNC: 2.6 UG/ML FEU (ref 0–0.5)
DIFFERENTIAL METHOD BLD: ABNORMAL
EOSINOPHIL # BLD AUTO: 0.1 10E9/L (ref 0–0.7)
EOSINOPHIL NFR BLD AUTO: 0.6 %
ERYTHROCYTE [DISTWIDTH] IN BLOOD BY AUTOMATED COUNT: 12.5 % (ref 10–15)
FERRITIN SERPL-MCNC: 553 NG/ML (ref 8–252)
FIBRINOGEN PPP-MCNC: 592 MG/DL (ref 200–420)
GFR SERPL CREATININE-BSD FRML MDRD: 62 ML/MIN/{1.73_M2}
GLUCOSE SERPL-MCNC: 92 MG/DL (ref 70–99)
HCT VFR BLD AUTO: 36.2 % (ref 35–47)
HGB BLD-MCNC: 11.9 G/DL (ref 11.7–15.7)
IMM GRANULOCYTES # BLD: 0.1 10E9/L (ref 0–0.4)
IMM GRANULOCYTES NFR BLD: 0.8 %
INR PPP: 1.22 (ref 0.86–1.14)
LABORATORY COMMENT REPORT: ABNORMAL
LDH SERPL L TO P-CCNC: 159 U/L (ref 81–234)
LYMPHOCYTES # BLD AUTO: 0.9 10E9/L (ref 0.8–5.3)
LYMPHOCYTES NFR BLD AUTO: 6 %
MAGNESIUM SERPL-MCNC: 1.8 MG/DL (ref 1.6–2.3)
MCH RBC QN AUTO: 32.5 PG (ref 26.5–33)
MCHC RBC AUTO-ENTMCNC: 32.9 G/DL (ref 31.5–36.5)
MCV RBC AUTO: 99 FL (ref 78–100)
MONOCYTES # BLD AUTO: 1.3 10E9/L (ref 0–1.3)
MONOCYTES NFR BLD AUTO: 8.6 %
NEUTROPHILS # BLD AUTO: 12.2 10E9/L (ref 1.6–8.3)
NEUTROPHILS NFR BLD AUTO: 83.9 %
NRBC # BLD AUTO: 0 10*3/UL
NRBC BLD AUTO-RTO: 0 /100
PLATELET # BLD AUTO: 67 10E9/L (ref 150–450)
POTASSIUM SERPL-SCNC: 4.3 MMOL/L (ref 3.4–5.3)
PROT SERPL-MCNC: 6.5 G/DL (ref 6.8–8.8)
RBC # BLD AUTO: 3.66 10E12/L (ref 3.8–5.2)
RETICS # AUTO: 46.5 10E9/L (ref 25–95)
RETICS/RBC NFR AUTO: 1.3 % (ref 0.5–2)
SARS-COV-2 RNA SPEC QL NAA+PROBE: NORMAL
SARS-COV-2 RNA SPEC QL NAA+PROBE: POSITIVE
SODIUM SERPL-SCNC: 137 MMOL/L (ref 133–144)
SPECIMEN SOURCE: ABNORMAL
SPECIMEN SOURCE: NORMAL
TROPONIN I SERPL-MCNC: <0.015 UG/L (ref 0–0.04)
WBC # BLD AUTO: 14.6 10E9/L (ref 4–11)

## 2020-12-06 PROCEDURE — 250N000012 HC RX MED GY IP 250 OP 636 PS 637: Performed by: FAMILY MEDICINE

## 2020-12-06 PROCEDURE — 85384 FIBRINOGEN ACTIVITY: CPT | Performed by: FAMILY MEDICINE

## 2020-12-06 PROCEDURE — 250N000013 HC RX MED GY IP 250 OP 250 PS 637: Performed by: FAMILY MEDICINE

## 2020-12-06 PROCEDURE — 120N000001 HC R&B MED SURG/OB

## 2020-12-06 PROCEDURE — 83615 LACTATE (LD) (LDH) ENZYME: CPT | Performed by: FAMILY MEDICINE

## 2020-12-06 PROCEDURE — 83520 IMMUNOASSAY QUANT NOS NONAB: CPT | Performed by: FAMILY MEDICINE

## 2020-12-06 PROCEDURE — 85045 AUTOMATED RETICULOCYTE COUNT: CPT | Performed by: FAMILY MEDICINE

## 2020-12-06 PROCEDURE — 85379 FIBRIN DEGRADATION QUANT: CPT | Performed by: FAMILY MEDICINE

## 2020-12-06 PROCEDURE — 84484 ASSAY OF TROPONIN QUANT: CPT | Performed by: FAMILY MEDICINE

## 2020-12-06 PROCEDURE — 85730 THROMBOPLASTIN TIME PARTIAL: CPT | Performed by: FAMILY MEDICINE

## 2020-12-06 PROCEDURE — 82550 ASSAY OF CK (CPK): CPT | Performed by: FAMILY MEDICINE

## 2020-12-06 PROCEDURE — 250N000011 HC RX IP 250 OP 636: Performed by: FAMILY MEDICINE

## 2020-12-06 PROCEDURE — 83735 ASSAY OF MAGNESIUM: CPT | Performed by: FAMILY MEDICINE

## 2020-12-06 PROCEDURE — 85025 COMPLETE CBC W/AUTO DIFF WBC: CPT | Performed by: FAMILY MEDICINE

## 2020-12-06 PROCEDURE — 250N000011 HC RX IP 250 OP 636: Performed by: EMERGENCY MEDICINE

## 2020-12-06 PROCEDURE — 36415 COLL VENOUS BLD VENIPUNCTURE: CPT | Performed by: FAMILY MEDICINE

## 2020-12-06 PROCEDURE — 99233 SBSQ HOSP IP/OBS HIGH 50: CPT | Performed by: FAMILY MEDICINE

## 2020-12-06 PROCEDURE — 86140 C-REACTIVE PROTEIN: CPT | Performed by: FAMILY MEDICINE

## 2020-12-06 PROCEDURE — 85610 PROTHROMBIN TIME: CPT | Performed by: FAMILY MEDICINE

## 2020-12-06 PROCEDURE — 99222 1ST HOSP IP/OBS MODERATE 55: CPT | Performed by: SURGERY

## 2020-12-06 PROCEDURE — 80053 COMPREHEN METABOLIC PANEL: CPT | Performed by: FAMILY MEDICINE

## 2020-12-06 PROCEDURE — 80158 DRUG ASSAY CYCLOSPORINE: CPT | Performed by: FAMILY MEDICINE

## 2020-12-06 PROCEDURE — 85300 ANTITHROMBIN III ACTIVITY: CPT | Performed by: FAMILY MEDICINE

## 2020-12-06 PROCEDURE — 82728 ASSAY OF FERRITIN: CPT | Performed by: FAMILY MEDICINE

## 2020-12-06 RX ORDER — NALOXONE HYDROCHLORIDE 0.4 MG/ML
0.2 INJECTION, SOLUTION INTRAMUSCULAR; INTRAVENOUS; SUBCUTANEOUS
Status: DISCONTINUED | OUTPATIENT
Start: 2020-12-06 | End: 2020-12-11 | Stop reason: HOSPADM

## 2020-12-06 RX ORDER — NALOXONE HYDROCHLORIDE 0.4 MG/ML
0.4 INJECTION, SOLUTION INTRAMUSCULAR; INTRAVENOUS; SUBCUTANEOUS
Status: DISCONTINUED | OUTPATIENT
Start: 2020-12-06 | End: 2020-12-11 | Stop reason: HOSPADM

## 2020-12-06 RX ORDER — HYDROMORPHONE HYDROCHLORIDE 1 MG/ML
0.5 INJECTION, SOLUTION INTRAMUSCULAR; INTRAVENOUS; SUBCUTANEOUS EVERY 4 HOURS PRN
Status: DISCONTINUED | OUTPATIENT
Start: 2020-12-06 | End: 2020-12-10

## 2020-12-06 RX ADMIN — CYCLOSPORINE 75 MG: 25 CAPSULE, LIQUID FILLED ORAL at 09:34

## 2020-12-06 RX ADMIN — METRONIDAZOLE 500 MG: 500 INJECTION, SOLUTION INTRAVENOUS at 12:22

## 2020-12-06 RX ADMIN — HYDROMORPHONE HYDROCHLORIDE 0.3 MG: 1 INJECTION, SOLUTION INTRAMUSCULAR; INTRAVENOUS; SUBCUTANEOUS at 05:00

## 2020-12-06 RX ADMIN — HYDROMORPHONE HYDROCHLORIDE 0.5 MG: 1 INJECTION, SOLUTION INTRAMUSCULAR; INTRAVENOUS; SUBCUTANEOUS at 12:25

## 2020-12-06 RX ADMIN — FAMOTIDINE 20 MG: 20 TABLET ORAL at 17:28

## 2020-12-06 RX ADMIN — HYDROMORPHONE HYDROCHLORIDE 0.5 MG: 1 INJECTION, SOLUTION INTRAMUSCULAR; INTRAVENOUS; SUBCUTANEOUS at 17:38

## 2020-12-06 RX ADMIN — METRONIDAZOLE 500 MG: 500 INJECTION, SOLUTION INTRAVENOUS at 20:16

## 2020-12-06 RX ADMIN — FAMOTIDINE 20 MG: 20 TABLET ORAL at 09:33

## 2020-12-06 RX ADMIN — METRONIDAZOLE 500 MG: 500 INJECTION, SOLUTION INTRAVENOUS at 04:49

## 2020-12-06 RX ADMIN — LEVOTHYROXINE SODIUM 125 MCG: 125 TABLET ORAL at 09:34

## 2020-12-06 RX ADMIN — CYCLOSPORINE 75 MG: 25 CAPSULE, LIQUID FILLED ORAL at 21:39

## 2020-12-06 ASSESSMENT — ACTIVITIES OF DAILY LIVING (ADL)
ADLS_ACUITY_SCORE: 15

## 2020-12-06 NOTE — PLAN OF CARE
Patient is NPO. She did complain of abdominal pain rated 7/10 with activity or 4/10 at rest, but decided against taking IV dilaudid, choosing to try a heat pack applied to the area instead. Up to bathroom with stand-by assist and cane. Temp currently 99.5. No bm since 1100 yesterday morning.     0500 addendum: the heat pack was ineffective at controlling the pain. Patient decided to get the dilaudid, but did not want the full dose. 0.3 mg given. Patient stated it was effective. Remainder was wasted properly.

## 2020-12-06 NOTE — PROGRESS NOTES
"Patient states her abd continues to to be oainful, worse when up to the bathroom. Has received dilaudid x 1 with improvement of pain. Patient is tearful at times. Ambulating to the bathroom with sba, gait is steady, post right knee 6 weeks ago. Taking ice chips and popsickle without increased abd pain.  Patient is covid asymptomatic. No fever this shift. No cough. Lung sounds are clear. Patient states  has been sick with \"cold\" for the last month.  "

## 2020-12-06 NOTE — PROGRESS NOTES
[facility]           Assessment & Plan        Nicci Pemberton is a 60 year old female who presents on 12/5/2020 with lower abdominal pain, low-grade subjective fever and 3 episodes of watery diarrhea       Diverticulitis of colon  12/5/20 -- Patient presents with lower abdominal pain and low-grade subjective fever at 99.9 associated with 3 episodes of watery diarrhea.     CBC showed normal white blood count and normal hemoglobin.  Lactate is normal, urinalysis showed no evidence of infection.  CT scan abdomen pelvis showed Urinary bladder wall thickening with soft tissue stranding around the bladder suspicious for cystitis. Small 2.0 x 1.5 cm fluid collection between the bladder and the uterus with early forming wall suspicious for possible abscess. There are multiple   colonic diverticula present with the pelvic soft tissue stranding abutting the sigmoid colon. It is possible there has been locally perforated sigmoid diverticulitis as a cause of the presumed small forming abscess in the pelvis. There are at least 2   tiny locules of air within the fluid collection  Patient was started on ceftriaxone and Flagyl  General surgery was consulted by the ED provider and they agree with the current plan of care, they will see the patient in the morning  Plan:  Continue ceftriaxone and Flagyl  Bowel rest, will keep the patient n.p.o. with exception for ice chips and meds  Follow-up with surgery recommendation  C. difficile ordered given diarrhea, previous history of C. difficile infection and immunosuppressive states.  12/6/2020 -- overall clinically unchanged, WBC up but fever improved.  Continue rocephin and flagyl for now.   CDiff pending.  Consider blood cultures if fever returns.           Status post liver transplantation (H)    Immunosuppressed status (H)  12/5/20 -- History of liver transplant secondary to ANGULO.  On cyclosporine 75 mg twice daily  Transplant hematology was contacted by  "the ED provider and the okay with admission to our hospital.  They also okay with the current plan of care  -Resume home medication cyclosporine 75 mg twice daily  12/6/2020 -- cyclosporin level ordered to be drawn before PM dose tonight.         Hypothyroidism   Continue home Synthroid       Lymphedema  12/5/20 Patient has lymphedema wrap in place  12/6/2020 -- appears baseline.  Follow weight.         Thrombocytopenia (H)  12/5/20 -- Chronic problem.  Platelet on admission 62.  No current issues with bleeding.   12/6/2020 -- stable.       Elevated bilirubin, s/p hepatic transplant  12/6/2020 -- nothing on CT scan, looks like bili has typically been 1.5-2, improved today.  Follow.  Discuss with transplant team if worsens.      COVID-19 screen pending - initially ordered as symptomatic due to fever, however source of fever was found so precautions were removed.       Diet:  regular     DVT Prophylaxis: Pneumatic Compression Devices    Forrester Catheter: not present    Code Status:   Full code     Lines: IV line                    Disposition  Anticipate at least 2-3 days inpatient.              Interval History:   Feels about the same. Still painful across lower abdomen, but no rebound or guarding.  No fever this AM but temp remains borderline. No dyspnea or cough or other URI symptoms.  No diarrhea so far today.  No new concerns.    No other pain             Review of Systems:    ROS: 10 point ROS neg other than the symptoms noted above in the HPI.           Medications:   Current active medications and PTA medications reviewed, see medication list for details.            Physical Exam:   Vitals were reviewed  Patient Vitals for the past 24 hrs:   BP Temp Temp src Pulse Resp SpO2 Height Weight   12/06/20 0725 124/68 100  F (37.8  C) Oral 89 18 94 % -- --   12/06/20 0326 119/63 99.5  F (37.5  C) Oral 87 18 97 % -- --   12/05/20 2259 118/67 99.5  F (37.5  C) Oral 89 18 94 % 1.575 m (5' 2.01\") 92.5 kg (203 lb 14.8 oz) " "  20 -- -- -- -- -- 94 % -- --   20 -- -- -- -- -- 91 % -- --   20 122/70 -- -- 99 -- 91 % -- --   20 -- -- -- -- -- 95 % -- --   20 130/67 -- -- 98 -- 95 % -- --   20 -- -- -- -- -- 93 % -- --   20 126/67 -- -- 101 -- 94 % -- --   20 126/82 -- -- 98 -- 96 % -- --   20 131/64 -- -- -- -- 97 % -- --   20 13164 99.9  F (37.7  C) Oral 88 -- 97 % -- --   20 123/83 101.6  F (38.7  C) Tympanic 113 18 97 % 1.575 m (5' 2\") 89.8 kg (198 lb)       Temperatures:  Current - Temp: 100  F (37.8  C); Max - Temp  Av.1  F (37.8  C)  Min: 99.5  F (37.5  C)  Max: 101.6  F (38.7  C)  Respiration range: Resp  Av  Min: 18  Max: 18  Pulse range: Pulse  Av.8  Min: 87  Max: 113  Blood pressure range: Systolic (24hrs), Av , Min:118 , Max:131   ; Diastolic (24hrs), Av, Min:63, Max:83    Pulse oximetry range: SpO2  Av.6 %  Min: 91 %  Max: 97 %  I/O last 3 completed shifts:  In:  [IV Piggyback:]  Out: -     Intake/Output Summary (Last 24 hours) at 2020 1008  Last data filed at 2020 2229  Gross per 24 hour   Intake  ml   Output --   Net 0 ml     EXAM:  General: awake and alert, NAD, oriented x 3  Head: normocephalic  Neck: unremarkable, no lymphadenopathy   HEENT: oropharynx pink and moist    Heart: Regular rate and rhythm, no murmurs, rubs, or gallops  Lungs: clear to auscultation bilaterally with good air movement throughout  Abdomen: soft, tender across lower abdomen, no rebound or guarding, slight tenderness upper/mid abdomen.  Normal bowel sounds, non-distended.    Extremities: no edema in lower extremities   Skin unremarkable.               Data:     Results for orders placed or performed during the hospital encounter of 20 (from the past 24 hour(s))   Comprehensive metabolic panel   Result Value Ref Range    Sodium 138 133 - 144 mmol/L    Potassium 3.6 3.4 - " 5.3 mmol/L    Chloride 106 94 - 109 mmol/L    Carbon Dioxide 28 20 - 32 mmol/L    Anion Gap 4 3 - 14 mmol/L    Glucose 85 70 - 99 mg/dL    Urea Nitrogen 36 (H) 7 - 30 mg/dL    Creatinine 1.07 (H) 0.52 - 1.04 mg/dL    GFR Estimate 56 (L) >60 mL/min/[1.73_m2]    GFR Estimate If Black 65 >60 mL/min/[1.73_m2]    Calcium 8.7 8.5 - 10.1 mg/dL    Bilirubin Total 2.0 (H) 0.2 - 1.3 mg/dL    Albumin 3.2 (L) 3.4 - 5.0 g/dL    Protein Total 7.2 6.8 - 8.8 g/dL    Alkaline Phosphatase 140 40 - 150 U/L    ALT 30 0 - 50 U/L    AST 15 0 - 45 U/L   CBC with platelets differential   Result Value Ref Range    WBC 10.3 4.0 - 11.0 10e9/L    RBC Count 3.72 (L) 3.8 - 5.2 10e12/L    Hemoglobin 12.1 11.7 - 15.7 g/dL    Hematocrit 36.5 35.0 - 47.0 %    MCV 98 78 - 100 fl    MCH 32.5 26.5 - 33.0 pg    MCHC 33.2 31.5 - 36.5 g/dL    RDW 12.5 10.0 - 15.0 %    Platelet Count 62 (L) 150 - 450 10e9/L    Diff Method Automated Method     % Neutrophils 81.2 %    % Lymphocytes 7.3 %    % Monocytes 10.1 %    % Eosinophils 0.6 %    % Basophils 0.1 %    % Immature Granulocytes 0.7 %    Nucleated RBCs 0 0 /100    Absolute Neutrophil 8.3 1.6 - 8.3 10e9/L    Absolute Lymphocytes 0.8 0.8 - 5.3 10e9/L    Absolute Monocytes 1.0 0.0 - 1.3 10e9/L    Absolute Eosinophils 0.1 0.0 - 0.7 10e9/L    Absolute Basophils 0.0 0.0 - 0.2 10e9/L    Abs Immature Granulocytes 0.1 0 - 0.4 10e9/L    Absolute Nucleated RBC 0.0    Lactate for Sepsis Protocol   Result Value Ref Range    Lactate for Sepsis Protocol 0.8 0.7 - 2.0 mmol/L   Lipase   Result Value Ref Range    Lipase 61 (L) 73 - 393 U/L   UA reflex to Microscopic   Result Value Ref Range    Color Urine Yellow     Appearance Urine Clear     Glucose Urine Negative NEG^Negative mg/dL    Bilirubin Urine Negative NEG^Negative    Ketones Urine Negative NEG^Negative mg/dL    Specific Gravity Urine 1.015 1.003 - 1.035    Blood Urine Negative NEG^Negative    pH Urine 5.0 5.0 - 7.0 pH    Protein Albumin Urine Negative NEG^Negative  mg/dL    Urobilinogen mg/dL 0.0 0.0 - 2.0 mg/dL    Nitrite Urine Negative NEG^Negative    Leukocyte Esterase Urine Negative NEG^Negative    Source Midstream Urine    CT Abdomen Pelvis w Contrast    Narrative    EXAM: CT ABDOMEN PELVIS W CONTRAST  LOCATION: [facility]  DATE/TIME: 12/5/2020 7:35 PM    INDICATION: Lower abdominal pain.  COMPARISON: 07/31/2019.  TECHNIQUE: CT scan of the abdomen and pelvis was performed following injection of IV contrast. Multiplanar reformats were obtained. Dose reduction techniques were used.  CONTRAST: 94 mL Isovue-370.    FINDINGS:   LOWER CHEST: Normal.    HEPATOBILIARY: Interval hepatic transplant. No ascites.    PANCREAS: Normal.    SPLEEN: Normal.    ADRENAL GLANDS: Normal.    KIDNEYS/BLADDER: Normal.    BOWEL: Multiple sigmoid diverticula present.    LYMPH NODES: Normal.    VASCULATURE: Unremarkable.    PELVIC ORGANS: Urinary bladder wall thickening with soft tissue haziness around the bladder suspicious for cystitis. There is a small 2.0 x 1.5 cm fluid collection between the bladder and anterior body of the uterus with early forming wall suspicious for   abscess.    MUSCULOSKELETAL: Normal.      Impression    IMPRESSION:   1.  Urinary bladder wall thickening with soft tissue stranding around the bladder suspicious for cystitis. Small 2.0 x 1.5 cm fluid collection between the bladder and the uterus with early forming wall suspicious for possible abscess. There are multiple   colonic diverticula present with the pelvic soft tissue stranding abutting the sigmoid colon. It is possible there has been locally perforated sigmoid diverticulitis as a cause of the presumed small forming abscess in the pelvis. There are at least 2   tiny locules of air within the fluid collection.   Symptomatic COVID-19 Virus (Coronavirus) by PCR    Specimen: Nasopharyngeal   Result Value Ref Range    COVID-19 Virus PCR to [facility] - Source Nasopharyngeal     COVID-19 Virus PCR to [facility] -  Result       Test received-See reflex to IDDL test SARS CoV2 (COVID-19) Virus RT-PCR   Potassium   Result Value Ref Range    Potassium 4.0 3.4 - 5.3 mmol/L   Magnesium   Result Value Ref Range    Magnesium 1.8 1.6 - 2.3 mg/dL   Comprehensive metabolic panel   Result Value Ref Range    Sodium 137 133 - 144 mmol/L    Potassium 4.3 3.4 - 5.3 mmol/L    Chloride 103 94 - 109 mmol/L    Carbon Dioxide 29 20 - 32 mmol/L    Anion Gap 5 3 - 14 mmol/L    Glucose 92 70 - 99 mg/dL    Urea Nitrogen 25 7 - 30 mg/dL    Creatinine 0.98 0.52 - 1.04 mg/dL    GFR Estimate 62 >60 mL/min/[1.73_m2]    GFR Estimate If Black 72 >60 mL/min/[1.73_m2]    Calcium 8.5 8.5 - 10.1 mg/dL    Bilirubin Total 1.7 (H) 0.2 - 1.3 mg/dL    Albumin 2.7 (L) 3.4 - 5.0 g/dL    Protein Total 6.5 (L) 6.8 - 8.8 g/dL    Alkaline Phosphatase 125 40 - 150 U/L    ALT 24 0 - 50 U/L    AST 19 0 - 45 U/L   CBC with platelets differential   Result Value Ref Range    WBC 14.6 (H) 4.0 - 11.0 10e9/L    RBC Count 3.66 (L) 3.8 - 5.2 10e12/L    Hemoglobin 11.9 11.7 - 15.7 g/dL    Hematocrit 36.2 35.0 - 47.0 %    MCV 99 78 - 100 fl    MCH 32.5 26.5 - 33.0 pg    MCHC 32.9 31.5 - 36.5 g/dL    RDW 12.5 10.0 - 15.0 %    Platelet Count 67 (L) 150 - 450 10e9/L    Diff Method Automated Method     % Neutrophils 83.9 %    % Lymphocytes 6.0 %    % Monocytes 8.6 %    % Eosinophils 0.6 %    % Basophils 0.1 %    % Immature Granulocytes 0.8 %    Nucleated RBCs 0 0 /100    Absolute Neutrophil 12.2 (H) 1.6 - 8.3 10e9/L    Absolute Lymphocytes 0.9 0.8 - 5.3 10e9/L    Absolute Monocytes 1.3 0.0 - 1.3 10e9/L    Absolute Eosinophils 0.1 0.0 - 0.7 10e9/L    Absolute Basophils 0.0 0.0 - 0.2 10e9/L    Abs Immature Granulocytes 0.1 0 - 0.4 10e9/L    Absolute Nucleated RBC 0.0            Attestation:  I have reviewed today's vital signs, notes, medications, labs and imaging.  Amount of time spent in direct patient care: 35 minutes.     Germain Mckenna MD, MD

## 2020-12-06 NOTE — PROGRESS NOTES
"[facility] ADMISSION NOTE    Patient admitted to room 2310 at approximately 2300 via cart from emergency room. Patient was accompanied by transport tech.     Verbal SBAR report received from ED [place] prior to patient arrival.     Patient ambulated to bed with stand-by assist. Patient alert and oriented X 3. Pain is controlled with current analgesics.  Medication(s) being used: narcotic analgesics including hydromorphone (Dilaudid). 0-10 Pain Scale: 5. Admission vital signs: Blood pressure 119/63, pulse 87, temperature 99.5  F (37.5  C), temperature source Oral, resp. rate 18, height 1.575 m (5' 2.01\"), weight 92.5 kg (203 lb 14.8 oz), SpO2 97 %, not currently breastfeeding. Patient was oriented to plan of care, call light, bed controls, tv, telephone, bathroom and visiting hours.     Risk Assessment    The following safety risks were identified during admission: fall. Yellow risk band applied: YES.     Skin Initial Assessment    This writer admitted this patient and completed a full skin assessment and Umair score in the Adult PCS flowsheet. Appropriate interventions initiated as needed.     Patient declined full skin assessment.    Umair Risk Assessment  Sensory Perception: 4-->no impairment  Moisture: 4-->rarely moist  Activity: 3-->walks occasionally  Mobility: 4-->no limitation  Nutrition: 3-->adequate  Friction and Shear: 3-->no apparent problem  Umair Score: 21  Bed Support Surface: Atmos Air mattress    Education    Patient has a [place] to Observation order: No  Observation education completed and documented: N/A      Mee Herrera RN    "

## 2020-12-06 NOTE — H&P
[facility]     History and Physical  Hospital Medicine       Date of Admission:  12/5/2020  Date of Service: 12/5/2020     Assessment & Plan   Nicci Pemberton is a 60 year old female who presents on 12/5/2020 with lower abdominal pain, low-grade subjective fever and 3 episodes of watery diarrhea    Principal Problem:    Diverticulitis of colon  Patient presents with lower abdominal pain and low-grade subjective fever at 99.9 associated with 3 episodes of watery diarrhea.     CBC showed normal white blood count and normal hemoglobin.  Lactate is normal, urinalysis showed no evidence of infection.  CT scan abdomen pelvis showed Urinary bladder wall thickening with soft tissue stranding around the bladder suspicious for cystitis. Small 2.0 x 1.5 cm fluid collection between the bladder and the uterus with early forming wall suspicious for possible abscess. There are multiple   colonic diverticula present with the pelvic soft tissue stranding abutting the sigmoid colon. It is possible there has been locally perforated sigmoid diverticulitis as a cause of the presumed small forming abscess in the pelvis. There are at least 2   tiny locules of air within the fluid collection  Patient was started on ceftriaxone and Flagyl  General surgery was consulted by the ED provider and they agree with the current plan of care, they will see the patient in the morning    Plan:  Continue ceftriaxone and Flagyl  Bowel rest, will keep the patient n.p.o. with exception for ice chips and meds  Follow-up with surgery recommendation  C. difficile ordered given concern of diarrhea, previous history of C. difficile infection and immunosuppressive states.    Active Problems:    Status post liver transplantation (H)    Immunosuppressed status (H)  History of liver transplant secondary to ANGULO  Home medication include cyclosporine 75 mg twice daily  Transplant hematology was contacted by the ED provider and the rosa with  admission to our hospital.  They also okay with the current plan of care  Plan:  -Resume home medication cyclosporine 75 mg twice daily  -We will obtain cyclosporine level in the morning    Hypothyroidism  Chronic stable problem.  Resume home Synthroid      Lymphedema  Patient has lymphedema wrap in place    Thrombocytopenia (H)  Chronic problem  Platelet on admission 62.  No current issues with bleeding  She follow-up with hematology  Continue to monitor    Suspected 2019 novel coronavirus infection  This patient was evaluated during a global COVID-19 pandemic, which necessitated consideration that the patient might be at risk for infection with the SARS-CoV-2 virus that causes COVID-19  Patient is currently on a special precaution for COVID-19 infection  COVID-19 PCR in process.  Discontinue precaution if PCR is negative          Diet:  regular   DVT Prophylaxis: Pneumatic Compression Devices  Forrester Catheter: not present  Code Status:   Full code   Lines: IV line     Disposition Plan   Expected discharge: 2 - 3 days, recommended to prior living arrangement once adequate pain management/ tolerating PO medications and antibiotic plan established.  Entered: Terrance Wilcox MD 12/05/2020, 10:16 PM     Status: Patient is appropriate for Inpatient   Terrance Wilcox MD        The patient's care was discussed with the Patient.    Primary Care Physician   Wale Thurston [phone]    History is obtained from the patient,  and review of old records via the EMR.    History of Present Illness   Nicci Pemberton is a 60 year old female with past medical history of liver transplant secondary to Murphy, hypothyroidism, thrombocytopenia, chronic osteoarthritis post knee replacement now presents on 12/5/2020 with lower abdominal pain and low-grade subjective fever at 99.9 Fahrenheit for 1 day  Patient also reports 3 episodes of watery diarrhea today.  Patient states her lower abdominal pain started yesterday and has been getting  worse.  She ate beef taco at the [employer] 2 days ago.  No sick contact with similar problem.  Patient denies any burning urination or change in urine color.  She reports mild headache started today while in the CT scan.  In the ED CBC showed normal white blood count and normal hemoglobin.  Lactate is normal, urinalysis showed no evidence of infection.  CT scan abdomen pelvis showed Urinary bladder wall thickening with soft tissue stranding around the bladder suspicious for cystitis. Small 2.0 x 1.5 cm fluid collection between the bladder and the uterus with early forming wall suspicious for possible abscess. There are multiple   colonic diverticula present with the pelvic soft tissue stranding abutting the sigmoid colon. It is possible there has been locally perforated sigmoid diverticulitis as a cause of the presumed small forming abscess in the pelvis. There are at least 2   tiny locules of air within the fluid collection    Patient was started on ceftriaxone and Flagyl for diverticulitis.  She was given 1 L bolus.    .Review of Systems   The 10 point Review of Systems is negative other than noted in the HPI or here.     Past Medical History      Past Medical History:   Diagnosis Date     Anemia      Cellulitis      Cirrhosis of liver (H) 06/18/2018     Heterozygous alpha 1-antitrypsin deficiency (H)      High hepatic iron concentration determined by biopsy of liver      Liver cirrhosis secondary to ANGULO (H)      Liver transplanted (H) 08/18/2019     Lymphedema      Osteoarthritis     knees     SBP (spontaneous bacterial peritonitis) (H) 08/02/2019 8/1/19 in [facility]     Thrombocytopenia (H)      Thyroid disease      Patient Active Problem List    Diagnosis Date Noted     Diverticulitis of colon 12/05/2020     Priority: High     Suspected 2019 novel coronavirus infection 12/05/2020     Priority: Medium     Bilateral edema of lower extremity 11/23/2020     Priority: Medium     Physical debility 11/23/2020     Priority:  Medium     Abnormal liver function 06/25/2020     Priority: Medium     Hypertension, unspecified type 06/25/2020     Priority: Medium     Calculus of gallbladder with acute cholecystitis without obstruction 06/25/2020     Priority: Medium     Gastroesophageal reflux disease without esophagitis 06/25/2020     Priority: Medium     Hyponatremia 06/25/2020     Priority: Medium     Lymphedema 06/25/2020     Priority: Medium     Macrocytosis without anemia 06/25/2020     Priority: Medium     Malaise 06/25/2020     Priority: Medium     Fatty liver disease, nonalcoholic 06/25/2020     Priority: Medium     Obstruction of bile duct 06/25/2020     Priority: Medium     Osteoarthritis of knee 06/25/2020     Priority: Medium     Post-menopausal bleeding 06/25/2020     Priority: Medium     Slow transit constipation 06/25/2020     Priority: Medium     Thrombocytopenia (H) 06/25/2020     Priority: Medium     Zinc deficiency 06/25/2020     Priority: Medium     Common bile duct stenosis 03/10/2020     Priority: Medium     Added automatically from request for surgery [id number]       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 difficile diarrhea 09/19/2019     Priority: Medium     Steroid-induced hyperglycemia 09/19/2019     Priority: Medium     Immunosuppressed status (H) 09/19/2019     Priority: Medium     Status post liver transplantation (H) 08/18/2019     Priority: Medium     Enterocolitis 07/31/2019     Priority: Medium     Chronic pain of both knees 07/29/2019     Priority: Medium     Venous hypertension of lower extremity, bilateral 07/29/2019      Priority: Medium     Cramp in lower extremity associated with sleep 07/29/2019     Priority: Medium     Leg swelling 07/29/2019     Priority: Medium     Osteoarthritis of both knees, unspecified osteoarthritis type 07/29/2019     Priority: Medium     Liver cirrhosis secondary to ANGULO (H)      Priority: Medium     Heterozygous alpha 1-antitrypsin deficiency (H)      Priority: Medium     Iron overload      Priority: Medium     Obesity with serious comorbidity 08/23/2017     Priority: Medium     Acquired lymphedema of leg 11/23/2016     Priority: Medium     Edema 11/23/2016     Priority: Medium     Hypothyroidism 11/23/2016     Priority: Medium     Peripheral neuropathy 11/23/2016     Priority: Medium     Vitamin D deficiency 11/23/2016     Priority: Medium        Past Surgical History     Past Surgical History:   Procedure Laterality Date     ARTHROPLASTY KNEE Right 10/23/2020    Procedure: Right  total knee arthroplasty;  Surgeon: Mario Wallace MD;  Location: [facility]     BENCH LIVER N/A 8/18/2019    Procedure: BACKBENCH PREPARATION, LIVER;  Surgeon: Mandeep Alford MD;  Location: [facility] OR     COLONOSCOPY N/A 6/22/2018    Procedure: COLONOSCOPY;  colonoscopy;  Surgeon: Hugo Patel MD;  Location: [facility]     ENDOSCOPIC RETROGRADE CHOLANGIOPANCREATOGRAM N/A 2/10/2020    Procedure: ENDOSCOPIC RETROGRADE CHOLANGIOPANCREATOGRAPHY with spinchterotomy;  Surgeon: Guru Rigoberto Canada MD;  [facility]     ENDOSCOPIC RETROGRADE CHOLANGIOPANCREATOGRAM N/A 5/13/2020    Procedure: ENDOSCOPIC RETROGRADE CHOLANGIOPANCREATOGRAPHY,Stent exchange and sludge removal;  Surgeon: Guru Rigoberto Canada MD;  Location: [facility]     ENDOSCOPIC RETROGRADE CHOLANGIOPANCREATOGRAPHY, EXCHANGE TUBE/STENT N/A 3/4/2020    Procedure: ENDOSCOPIC RETROGRADE CHOLANGIOPANCREATOGRAPHY, WITH biliary dilarion, stent exchange, stone removal;  Surgeon: Guru Rigoberto Canada MD;  Location: [facility]      ESOPHAGOSCOPY, GASTROSCOPY, DUODENOSCOPY (EGD), COMBINED N/A 10/31/2019    Procedure: Esophagogastroduodenoscopy, With Biopsy;  Surgeon: Nerissa Aranda MD;  Location: [facility] GI     IR FEEDING TUBE PLACEMENT MERCEDEZ PRESTON/MD  2019     IR FLUORO 0-1 HOUR  2019     IR LUMBAR PUNCTURE  2019     KNEE SURGERY  10/23/20     TRANSPLANT LIVER RECIPIENT  DONOR N/A 2019    Procedure: TRANSPLANT, LIVER, RECIPIENT,  DONOR;  Surgeon: Mandeep Alford MD;  Location: [facility]        Prior to Admission Medications   Prior to Admission Medications   Prescriptions Last Dose Informant Patient Reported? Taking?   Cholecalciferol (VITAMIN D-3) 25 MCG (1000 UT) CAPS   No No   Sig: Take 1,000 Units by mouth 2 times daily   acetaminophen (TYLENOL) 325 MG tablet   No No   Sig: Take 2 tablets (650 mg) by mouth every 4 hours   aspirin (ASA) 81 MG EC tablet   No No   Sig: Take 2 tablets (162 mg) by mouth 2 times daily   cycloSPORINE modified (GENERIC EQUIVALENT) 25 MG capsule   No No   Sig: Take 3 capsules (75 mg) by mouth 2 times daily   dexamethasone (DECADRON) 4 MG tablet   No No   Sig: Take 10 tablets (40 mg) by mouth daily for 4 days   famotidine (PEPCID) 20 MG tablet   No No   Sig: Take 1 tablet (20 mg) by mouth 2 times daily   levothyroxine (SYNTHROID/LEVOTHROID) 125 MCG tablet   Yes No   Sig: Take 125 mcg by mouth daily       Facility-Administered Medications: None     Allergies   Allergies   Allergen Reactions     Ciprofloxacin Dizziness and Nausea     Food      Fruits with cores and pits  Apples     Sulfa Drugs Unknown     Swollen joints     Furosemide Rash       Family History    Family History   Problem Relation Age of Onset     Diabetes Maternal Grandfather         Diagnosed at age 83     Cirrhosis No family hx of      Liver Cancer No family hx of        Social History   Social History     Socioeconomic History     Marital status:      Spouse name: Not on file     Number of children:  "Not on file     Years of education: Not on file     Highest education level: Not on file   Occupational History     Not on file   Social Needs     Financial resource strain: Not on file     Food insecurity     Worry: Not on file     Inability: Not on file     Transportation needs     Medical: Not on file     Non-medical: Not on file   Tobacco Use     Smoking status: Former Smoker     Packs/day: 0.50     Years: 10.00     Pack years: 5.00     Types: Cigarettes     Start date: 2000     Quit date:      Years since quittin.1     Smokeless tobacco: Never Used   Substance and Sexual Activity     Alcohol use: No     Drug use: No     Sexual activity: Not Currently     Partners: Male     Birth control/protection: Post-menopausal   Lifestyle     Physical activity     Days per week: Not on file     Minutes per session: Not on file     Stress: Not on file   Relationships     Social connections     Talks on phone: Not on file     Gets together: Not on file     Attends [employer] service: Not on file     Active member of club or organization: Not on file     Attends meetings of clubs or organizations: Not on file     Relationship status: Not on file     Intimate partner violence     Fear of current or ex partner: Not on file     Emotionally abused: Not on file     Physically abused: Not on file     Forced sexual activity: Not on file   Other Topics Concern     Parent/sibling w/ CABG, MI or angioplasty before 65F 55M? Not Asked   Social History Narrative     Not on file       Physical Exam   /67   Pulse 98   Temp 99.9  F (37.7  C) (Oral)   Resp 18   Ht 1.575 m (5' 2\")   Wt 89.8 kg (198 lb)   SpO2 95%   BMI 36.21 kg/m       Weight: 198 lbs 0 oz Body mass index is 36.21 kg/m .     Constitutional: Alert, oriented, cooperative, no apparent distress, appears nontoxic  Eyes: Eyes are clear, pupils are reactive.  HENT:  No evidence of cranial trauma.  Lymph/Hematologic: No epitrochlear, axillary, anterior or " posterior cervical, or supraclavicular lymphadenopathy is appreciated.LE lymphedema   Cardiovascular: Regular rate and rhythm, normal S1 and S2, and no murmur noted.   Respiratory: Clear to auscultation bilaterally.   GI: Soft, lower abdominal tenderness, positive rebound tenderness .   Genitourinary: Deferred  Musculoskeletal: Normal muscle bulk and tone.  Skin: Warm and dry, no rashes.   Neurologic: Neck supple. Cranial nerves are grossly intact.  is symmetric.     Data   Data reviewed today:   Recent Labs   Lab 12/05/20  1825 12/03/20  1608 11/30/20  1535   WBC 10.3 13.3* 4.6   HGB 12.1 11.6* 11.4*   MCV 98 100 100   PLT 62* 76* 19*     --   --    POTASSIUM 3.6  --   --    CHLORIDE 106  --   --    CO2 28  --   --    BUN 36*  --   --    CR 1.07*  --   --    ANIONGAP 4  --   --    JACOB 8.7  --   --    GLC 85  --   --    ALBUMIN 3.2*  --   --    PROTTOTAL 7.2  --   --    BILITOTAL 2.0*  --   --    ALKPHOS 140  --   --    ALT 30  --   --    AST 15  --   --    LIPASE 61*  --   --        Recent Results (from the past 24 hour(s))   CT Abdomen Pelvis w Contrast    Narrative    EXAM: CT ABDOMEN PELVIS W CONTRAST  LOCATION: [facility]  DATE/TIME: 12/5/2020 7:35 PM    INDICATION: Lower abdominal pain.  COMPARISON: 07/31/2019.  TECHNIQUE: CT scan of the abdomen and pelvis was performed following injection of IV contrast. Multiplanar reformats were obtained. Dose reduction techniques were used.  CONTRAST: 94 mL Isovue-[id number].    FINDINGS:   LOWER CHEST: Normal.    HEPATOBILIARY: Interval hepatic transplant. No ascites.    PANCREAS: Normal.    SPLEEN: Normal.    ADRENAL GLANDS: Normal.    KIDNEYS/BLADDER: Normal.    BOWEL: Multiple sigmoid diverticula present.    LYMPH NODES: Normal.    VASCULATURE: Unremarkable.    PELVIC ORGANS: Urinary bladder wall thickening with soft tissue haziness around the bladder suspicious for cystitis. There is a small 2.0 x 1.5 cm fluid collection between the bladder and  anterior body of the uterus with early forming wall suspicious for   abscess.    MUSCULOSKELETAL: Normal.      Impression    IMPRESSION:   1.  Urinary bladder wall thickening with soft tissue stranding around the bladder suspicious for cystitis. Small 2.0 x 1.5 cm fluid collection between the bladder and the uterus with early forming wall suspicious for possible abscess. There are multiple   colonic diverticula present with the pelvic soft tissue stranding abutting the sigmoid colon. It is possible there has been locally perforated sigmoid diverticulitis as a cause of the presumed small forming abscess in the pelvis. There are at least 2   tiny locules of air within the fluid collection.       I personally reviewed the EKG tracing showing sinus rhythm.

## 2020-12-06 NOTE — CONSULTS
[employer]  Surgery Consultation         [facility] MD Archana    Nicci Pemberton MRN# [id number]   YOB: 1960 Age: 60 year old      Date of Consult: 12/6/2020    I was asked to see this patient at the request of Dr. Mckenna for evaluation of perforated diverticulitis .         Assessment and Plan:   Nicci Pemberton is a 60 year old female who presented with history, exam, laboratory and imaging most consistent with:      -Perforated diverticulitis with abscess  -COVID positive  -s/p liver transplantation 2/2 ANGULO  -thrombocytopenia  -immunosuppressed status    Patient has perforated diverticulitis with a small abscess in between the uterus and the bladder.  I doubt that there is a window to aspirate this abscess via IR.  However I will talk to the radiologist in the a.m. to see if an IR aspiration versus PERC drain is possible.  For now patient is to be n.p.o. -she may have popsicles and ice chips.  Ever she is to be n.p.o. after midnight in case of a PERC drainage or IR aspiration under CT guided.  Continue antibiotic by hospitalist.      I explained to the patient in detail the regarding the pathophysiology of perforated diverticulitis.  She is not septic, her numbers are improving and her pain is well controlled.  We will continue this conservative management unless she becomes clinically worsens.  Patient understands if that occurs then surgical intervention is needed, she will likely need a Whitehead's procedure.  She will also need a colonoscopy 6 to 8 weeks after she heals from this episode of diverticulitis.    Time talking to patient including looking up records and talking to other physicians and nurses about patient care is 60 mins.            Code Status:   Full Code         Primary Care Physician:   Wale Thurston         Requesting Physician:      No ref. provider found         Chief Complaint:     Chief Complaint   Patient presents with     Pelvic Pain     groin pain started yesterday  "    Fever     in triage, hx of liver transplant 08/2019, knee surgery 10/23/2020       History is obtained from the patient         History of Present Illness:   Nicci Pemberton is a 60 year old female who significant past medical history of hypothyroidism, lymphedema, immune suppressed, status post liver transplant secondary to ANGULO presents on consultation with  Lower abdominal pain.   Stated that this been ongoing for the past week.  Gotten to be so bad that patient presented to the ER for further evaluation.  Patient thought it was just \"gas\" and would go away if she waited out.  Pain mostly in the lower abdomen.  Never had pain like this before.  Came to the ED as pain was not improving and she continues to have low-grade fevers, and watery diarrhea.  Patient significant for thrombocytopenia and has been treated with steroids to help her thrombocytopenia.  Never had history of diverticulitis.  Her last colonoscopy was in 2018 -only diverticula were found in the sigmoid.  No other abnormalities was noted.  Patient is on cyclosporine as her maintenance since her liver transplant.  Denies any melena, hematochezia, nausea, vomiting.   Patient is not hungry.  Is much better controlled with the pain medication in the hospital.  Denies any sick contact.  Denies any shortness of breath or chest pain.  No active signs of Covid.  The past medical history, past surgical history, family history, social history and review of systems was performed and as noted.           Past Medical History:     Past Medical History:   Diagnosis Date     Anemia      Cellulitis      Cirrhosis of liver (H) 06/18/2018     Heterozygous alpha 1-antitrypsin deficiency (H)      High hepatic iron concentration determined by biopsy of liver      Liver cirrhosis secondary to ANGULO (H)      Liver transplanted (H) 08/18/2019     Lymphedema      Osteoarthritis     knees     SBP (spontaneous bacterial peritonitis) (H) 08/02/2019 8/1/19 in [facility]     " Thrombocytopenia (H)      Thyroid disease              Past Surgical History:     Past Surgical History:   Procedure Laterality Date     ARTHROPLASTY KNEE Right 10/23/2020    Procedure: Right  total knee arthroplasty;  Surgeon: Mario Wallace MD;  Location: [facility] OR     BENCH LIVER N/A 2019    Procedure: BACKBENCH PREPARATION, LIVER;  Surgeon: Mandeep Alford MD;  Location: [facility] OR     COLONOSCOPY N/A 2018    Procedure: COLONOSCOPY;  colonoscopy;  Surgeon: Hugo Patel MD;  [facility]     ENDOSCOPIC RETROGRADE CHOLANGIOPANCREATOGRAM N/A 2/10/2020    Procedure: ENDOSCOPIC RETROGRADE CHOLANGIOPANCREATOGRAPHY with spinchterotomy;  Surgeon: Guru Rigoberto Canada MD;  [facility] OR     ENDOSCOPIC RETROGRADE CHOLANGIOPANCREATOGRAM N/A 2020    Procedure: ENDOSCOPIC RETROGRADE CHOLANGIOPANCREATOGRAPHY,Stent exchange and sludge removal;  Surgeon: Guru Rigoberto Canada MD;  [facility]     ENDOSCOPIC RETROGRADE CHOLANGIOPANCREATOGRAPHY, EXCHANGE TUBE/STENT N/A 3/4/2020    Procedure: ENDOSCOPIC RETROGRADE CHOLANGIOPANCREATOGRAPHY, WITH biliary dilarion, stent exchange, stone removal;  Surgeon: Guru Rigoberto Canada MD;  Location: [facility] OR     ESOPHAGOSCOPY, GASTROSCOPY, DUODENOSCOPY (EGD), COMBINED N/A 10/31/2019    Procedure: Esophagogastroduodenoscopy, With Biopsy;  Surgeon: Nerissa Aranda MD;  Location: [facility] GI     IR FEEDING TUBE PLACEMENT MERCEDEZ PRESTON/MD  2019     IR FLUORO 0-1 HOUR  2019     IR LUMBAR PUNCTURE  2019     KNEE SURGERY  10/23/20     TRANSPLANT LIVER RECIPIENT  DONOR N/A 2019    Procedure: TRANSPLANT, LIVER, RECIPIENT,  DONOR;  Surgeon: Mandeep Alford MD;  Location: [facility]            Home Medications:     Prior to Admission medications    Medication Sig Last Dose Taking? Auth Provider   acetaminophen (TYLENOL) 325 MG tablet Take 2 tablets (650 mg) by mouth every 4 hours 12/3/2020 at  Unknown time Yes Beatriz Pearce APRN CNS   aspirin (ASA) 81 MG EC tablet Take 2 tablets (162 mg) by mouth 2 times daily Past Month at Unknown time Yes Camilo Trujillo MD   Cholecalciferol (VITAMIN D-3) 25 MCG (1000 UT) CAPS Take 1,000 Units by mouth 2 times daily 12/5/2020 at 1000 Yes Mandeep Alford MD   cycloSPORINE modified (GENERIC EQUIVALENT) 25 MG capsule Take 3 capsules (75 mg) by mouth 2 times daily 12/5/2020 at 1000 Yes Leventhal, Thomas Michael, MD   famotidine (PEPCID) 20 MG tablet Take 1 tablet (20 mg) by mouth 2 times daily  Patient taking differently: Take 20 mg by mouth daily  12/5/2020 at 1200 Yes Mandeep Alford MD   levothyroxine (SYNTHROID/LEVOTHROID) 125 MCG tablet Take 125 mcg by mouth daily  12/5/2020 at 1000 Yes Abstract, Provider   dexamethasone (DECADRON) 4 MG tablet Take 10 tablets (40 mg) by mouth daily for 4 days 12/3/2020  Leventhal, Thomas Michael, MD            Current Medications:           cycloSPORINE modified  75 mg Oral BID     famotidine  20 mg Oral BID     levothyroxine  125 mcg Oral Daily     metroNIDAZOLE  500 mg Intravenous Q8H     sodium chloride (PF)  3 mL Intravenous Q8H     HYDROmorphone, lidocaine 4%, lidocaine (buffered or not buffered), - MEDICATION INSTRUCTIONS -, melatonin, naloxone **OR** naloxone **OR** naloxone **OR** naloxone, sodium chloride (PF), sodium chloride (PF)         Allergies:     Allergies   Allergen Reactions     Ciprofloxacin Dizziness and Nausea     Food      Fruits with cores and pits  Apples     Sulfa Drugs Unknown     Swollen joints     Furosemide Rash            Social History:   Nicci Pemberton  reports that she quit smoking about 9 years ago. Her smoking use included cigarettes. She started smoking about 20 years ago. She has a 5.00 pack-year smoking history. She has never used smokeless tobacco. She reports that she does not drink alcohol or use drugs.          Family History:     Family History   Problem  "Relation Age of Onset     Diabetes Maternal Grandfather         Diagnosed at age 83     Cirrhosis No family hx of      Liver Cancer No family hx of              Review of Systems:   The 10 point Review of Systems is negative other than noted in the HPI or here.           Physical Exam:    ,   Vitals: /66   Pulse 84   Temp 98.4  F (36.9  C) (Oral)   Resp 18   Ht 1.575 m (5' 2.01\")   Wt 92.5 kg (203 lb 14.8 oz)   SpO2 95%   BMI 37.29 kg/m    BMI= Body mass index is 37.29 kg/m .    Constitutional: Awake, alert, no acute distress.  Eyes: No scleral icterus.  Conjunctiva are without injection.  ENMT: Mucous membranes moist, dentition and gums are intact.   Neck: Soft, supple, trachea midline.    Endocrine: The thyroid is without masses and mobile with swallow.   Lymphatic: There is no cervical, submandibular, supraclavicular/infraclavicular adenopathy.  Respiratory: Lungs are clear to auscultation and percussion bilaterally.   Cardiovascular: Regular rate and rhythm. no bilateral lower extremity edema.   Abdomen: Non-distended, non-tender, , No masses, umbilical  hernias, or flank tenderness. No hepatosplenomegaly.  Noted chevron incision from the previous transplant.  Musculoskeletal: No spinal or CVA tenderness. Full range of motion in the upper and lower extremities.    Skin: No skin rashes or lesions to inspection.  No petechia.    Neurologic: Cranial nerves II through XII are grossly intact and symmetric.  Psychiatric: The patient is alert and oriented times 3.  The patient's affect is not blunted and mood is appropriate.       Data:   All new lab and imaging data was reviewed.   Results for orders placed or performed during the hospital encounter of 12/05/20 (from the past 24 hour(s))   Comprehensive metabolic panel   Result Value Ref Range    Sodium 138 133 - 144 mmol/L    Potassium 3.6 3.4 - 5.3 mmol/L    Chloride 106 94 - 109 mmol/L    Carbon Dioxide 28 20 - 32 mmol/L    Anion Gap 4 3 - 14 mmol/L    " Glucose 85 70 - 99 mg/dL    Urea Nitrogen 36 (H) 7 - 30 mg/dL    Creatinine 1.07 (H) 0.52 - 1.04 mg/dL    GFR Estimate 56 (L) >60 mL/min/[1.73_m2]    GFR Estimate If Black 65 >60 mL/min/[1.73_m2]    Calcium 8.7 8.5 - 10.1 mg/dL    Bilirubin Total 2.0 (H) 0.2 - 1.3 mg/dL    Albumin 3.2 (L) 3.4 - 5.0 g/dL    Protein Total 7.2 6.8 - 8.8 g/dL    Alkaline Phosphatase 140 40 - 150 U/L    ALT 30 0 - 50 U/L    AST 15 0 - 45 U/L   CBC with platelets differential   Result Value Ref Range    WBC 10.3 4.0 - 11.0 10e9/L    RBC Count 3.72 (L) 3.8 - 5.2 10e12/L    Hemoglobin 12.1 11.7 - 15.7 g/dL    Hematocrit 36.5 35.0 - 47.0 %    MCV 98 78 - 100 fl    MCH 32.5 26.5 - 33.0 pg    MCHC 33.2 31.5 - 36.5 g/dL    RDW 12.5 10.0 - 15.0 %    Platelet Count 62 (L) 150 - 450 10e9/L    Diff Method Automated Method     % Neutrophils 81.2 %    % Lymphocytes 7.3 %    % Monocytes 10.1 %    % Eosinophils 0.6 %    % Basophils 0.1 %    % Immature Granulocytes 0.7 %    Nucleated RBCs 0 0 /100    Absolute Neutrophil 8.3 1.6 - 8.3 10e9/L    Absolute Lymphocytes 0.8 0.8 - 5.3 10e9/L    Absolute Monocytes 1.0 0.0 - 1.3 10e9/L    Absolute Eosinophils 0.1 0.0 - 0.7 10e9/L    Absolute Basophils 0.0 0.0 - 0.2 10e9/L    Abs Immature Granulocytes 0.1 0 - 0.4 10e9/L    Absolute Nucleated RBC 0.0    Lactate for Sepsis Protocol   Result Value Ref Range    Lactate for Sepsis Protocol 0.8 0.7 - 2.0 mmol/L   Lipase   Result Value Ref Range    Lipase 61 (L) 73 - 393 U/L   UA reflex to Microscopic   Result Value Ref Range    Color Urine Yellow     Appearance Urine Clear     Glucose Urine Negative NEG^Negative mg/dL    Bilirubin Urine Negative NEG^Negative    Ketones Urine Negative NEG^Negative mg/dL    Specific Gravity Urine 1.015 1.003 - 1.035    Blood Urine Negative NEG^Negative    pH Urine 5.0 5.0 - 7.0 pH    Protein Albumin Urine Negative NEG^Negative mg/dL    Urobilinogen mg/dL 0.0 0.0 - 2.0 mg/dL    Nitrite Urine Negative NEG^Negative    Leukocyte Esterase  Urine Negative NEG^Negative    Source Midstream Urine    CT Abdomen Pelvis w Contrast    Narrative    EXAM: CT ABDOMEN PELVIS W CONTRAST  LOCATION: [facility]  DATE/TIME: 12/5/2020 7:35 PM    INDICATION: Lower abdominal pain.  COMPARISON: 07/31/2019.  TECHNIQUE: CT scan of the abdomen and pelvis was performed following injection of IV contrast. Multiplanar reformats were obtained. Dose reduction techniques were used.  CONTRAST: 94 mL Isovue-370.    FINDINGS:   LOWER CHEST: Normal.    HEPATOBILIARY: Interval hepatic transplant. No ascites.    PANCREAS: Normal.    SPLEEN: Normal.    ADRENAL GLANDS: Normal.    KIDNEYS/BLADDER: Normal.    BOWEL: Multiple sigmoid diverticula present.    LYMPH NODES: Normal.    VASCULATURE: Unremarkable.    PELVIC ORGANS: Urinary bladder wall thickening with soft tissue haziness around the bladder suspicious for cystitis. There is a small 2.0 x 1.5 cm fluid collection between the bladder and anterior body of the uterus with early forming wall suspicious for   abscess.    MUSCULOSKELETAL: Normal.      Impression    IMPRESSION:   1.  Urinary bladder wall thickening with soft tissue stranding around the bladder suspicious for cystitis. Small 2.0 x 1.5 cm fluid collection between the bladder and the uterus with early forming wall suspicious for possible abscess. There are multiple   colonic diverticula present with the pelvic soft tissue stranding abutting the sigmoid colon. It is possible there has been locally perforated sigmoid diverticulitis as a cause of the presumed small forming abscess in the pelvis. There are at least 2   tiny locules of air within the fluid collection.   Symptomatic COVID-19 Virus (Coronavirus) by PCR    Specimen: Nasopharyngeal   Result Value Ref Range    COVID-19 Virus PCR to [facility] - Source Nasopharyngeal     COVID-19 Virus PCR to [facility] - Result       Test received-See reflex to IDDL test SARS CoV2 (COVID-19) Virus RT-PCR   SARS-CoV-2 COVID-19  Virus (Coronavirus) RT-PCR Nasopharyngeal    Specimen: Nasopharyngeal   Result Value Ref Range    SARS-CoV-2 Virus Specimen Source Nasopharyngeal     SARS-CoV-2 PCR Result POSITIVE (AA)     SARS-CoV-2 PCR Comment       Testing was performed using the [employer] SARS-CoV-2 Assay on the [employer] Instrument System.   Additional information about this Emergency Use Authorization (EUA) assay can be found via   the Lab Guide.     Potassium   Result Value Ref Range    Potassium 4.0 3.4 - 5.3 mmol/L   Magnesium   Result Value Ref Range    Magnesium 1.8 1.6 - 2.3 mg/dL   Comprehensive metabolic panel   Result Value Ref Range    Sodium 137 133 - 144 mmol/L    Potassium 4.3 3.4 - 5.3 mmol/L    Chloride 103 94 - 109 mmol/L    Carbon Dioxide 29 20 - 32 mmol/L    Anion Gap 5 3 - 14 mmol/L    Glucose 92 70 - 99 mg/dL    Urea Nitrogen 25 7 - 30 mg/dL    Creatinine 0.98 0.52 - 1.04 mg/dL    GFR Estimate 62 >60 mL/min/[1.73_m2]    GFR Estimate If Black 72 >60 mL/min/[1.73_m2]    Calcium 8.5 8.5 - 10.1 mg/dL    Bilirubin Total 1.7 (H) 0.2 - 1.3 mg/dL    Albumin 2.7 (L) 3.4 - 5.0 g/dL    Protein Total 6.5 (L) 6.8 - 8.8 g/dL    Alkaline Phosphatase 125 40 - 150 U/L    ALT 24 0 - 50 U/L    AST 19 0 - 45 U/L   CBC with platelets differential   Result Value Ref Range    WBC 14.6 (H) 4.0 - 11.0 10e9/L    RBC Count 3.66 (L) 3.8 - 5.2 10e12/L    Hemoglobin 11.9 11.7 - 15.7 g/dL    Hematocrit 36.2 35.0 - 47.0 %    MCV 99 78 - 100 fl    MCH 32.5 26.5 - 33.0 pg    MCHC 32.9 31.5 - 36.5 g/dL    RDW 12.5 10.0 - 15.0 %    Platelet Count 67 (L) 150 - 450 10e9/L    Diff Method Automated Method     % Neutrophils 83.9 %    % Lymphocytes 6.0 %    % Monocytes 8.6 %    % Eosinophils 0.6 %    % Basophils 0.1 %    % Immature Granulocytes 0.8 %    Nucleated RBCs 0 0 /100    Absolute Neutrophil 12.2 (H) 1.6 - 8.3 10e9/L    Absolute Lymphocytes 0.9 0.8 - 5.3 10e9/L    Absolute Monocytes 1.3 0.0 - 1.3 10e9/L    Absolute Eosinophils 0.1 0.0 - 0.7 10e9/L    Absolute  Basophils 0.0 0.0 - 0.2 10e9/L    Abs Immature Granulocytes 0.1 0 - 0.4 10e9/L    Absolute Nucleated RBC 0.0    CRP inflammation   Result Value Ref Range    CRP Inflammation 119.0 (H) 0.0 - 8.0 mg/L   Reticulocyte count   Result Value Ref Range    % Retic 1.3 0.5 - 2.0 %    Absolute Retic 46.5 25 - 95 10e9/L   INR   Result Value Ref Range    INR 1.22 (H) 0.86 - 1.14       CT scan of the abdomen:   No masses, free air, abcesses or significant abnormalities  CT scan interpreted by radiologist       Alyx Magaña MD    [facility]    Disclaimer: This note consists of words and symbols derived from keyboarding and dictation using voice recognition software.  As a result, there may be errors that have gone undetected.  Please consider this when interpreting information found in this note.

## 2020-12-06 NOTE — PROGRESS NOTES
Dr Mckenna notified patient is covid positive. Patient also informed. Will move her to room 2314 and she is in agreement

## 2020-12-06 NOTE — ED PROVIDER NOTES
History     Chief Complaint   Patient presents with     Pelvic Pain     groin pain started yesterday     Fever     in triage, hx of liver transplant 08/2019, knee surgery 10/23/2020     HPI  Nicci Pemberton is a 60 year old female who presents from home with low-grade fever today, developed lower abdominal aching sensation, urinary frequency without dysuria, diminished appetite beginning last evening.  Denies nausea or vomiting.  Denies diarrhea, she has had 3 loose bowel movements earlier today without black or bloody component.  Cholecystectomy in the context of liver transplant no other abdominal surgery.  Fairly abrupt onset of discomfort initially, has settled into a constant pain that is worse with movement, radiates across low abdomen/pelvis.  No prior such pain no inciting trauma or strain.  No ill contacts [place] to no contact with anyone.  She is a little over a year out from liver transplant secondary to ANGULO, recent right total knee replacement 10/23/2020.  Currently seeing hematology secondary to thrombocytopenia, just finished 4 days of dexamethasone 40 mg daily, last dose yesterday.  Only antirejection drug is cyclosporine.  Allergies:  Allergies   Allergen Reactions     Ciprofloxacin Dizziness and Nausea     Food      Fruits with cores and pits  Apples     Sulfa Drugs Unknown     Swollen joints     Furosemide Rash       Problem List:    Patient Active Problem List    Diagnosis Date Noted     Diverticulitis of colon 12/05/2020     Priority: Medium     Suspected 2019 novel coronavirus infection 12/05/2020     Priority: Medium     Bilateral edema of lower extremity 11/23/2020     Priority: Medium     Physical debility 11/23/2020     Priority: Medium     Abnormal liver function 06/25/2020     Priority: Medium     Hypertension, unspecified type 06/25/2020     Priority: Medium     Calculus of gallbladder with acute cholecystitis without obstruction  06/25/2020     Priority: Medium     Gastroesophageal reflux disease without esophagitis 06/25/2020     Priority: Medium     Hyponatremia 06/25/2020     Priority: Medium     Lymphedema 06/25/2020     Priority: Medium     Macrocytosis without anemia 06/25/2020     Priority: Medium     Malaise 06/25/2020     Priority: Medium     Fatty liver disease, nonalcoholic 06/25/2020     Priority: Medium     Obstruction of bile duct 06/25/2020     Priority: Medium     Osteoarthritis of knee 06/25/2020     Priority: Medium     Post-menopausal bleeding 06/25/2020     Priority: Medium     Slow transit constipation 06/25/2020     Priority: Medium     Thrombocytopenia (H) 06/25/2020     Priority: Medium     Zinc deficiency 06/25/2020     Priority: Medium     Common bile duct stenosis 03/10/2020     Priority: Medium     Added automatically from request for surgery [id number]       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 difficile diarrhea 09/19/2019     Priority: Medium     Steroid-induced hyperglycemia 09/19/2019     Priority: Medium     Immunosuppressed status (H) 09/19/2019     Priority: Medium     Status post liver transplantation (H) 08/18/2019     Priority: Medium     Enterocolitis 07/31/2019     Priority: Medium     Chronic pain of both knees 07/29/2019     Priority: Medium     Venous hypertension of lower extremity, bilateral 07/29/2019     Priority: Medium     Cramp in lower extremity associated with sleep 07/29/2019     Priority: Medium     Leg swelling 07/29/2019     Priority: Medium     Osteoarthritis of both knees, unspecified  osteoarthritis type 07/29/2019     Priority: Medium     Liver cirrhosis secondary to ANGULO (H)      Priority: Medium     Heterozygous alpha 1-antitrypsin deficiency (H)      Priority: Medium     Iron overload      Priority: Medium     Obesity with serious comorbidity 08/23/2017     Priority: Medium     Acquired lymphedema of leg 11/23/2016     Priority: Medium     Edema 11/23/2016     Priority: Medium     Hypothyroidism 11/23/2016     Priority: Medium     Peripheral neuropathy 11/23/2016     Priority: Medium     Vitamin D deficiency 11/23/2016     Priority: Medium        Past Medical History:    Past Medical History:   Diagnosis Date     Anemia      Cellulitis      Cirrhosis of liver (H) 06/18/2018     Heterozygous alpha 1-antitrypsin deficiency (H)      High hepatic iron concentration determined by biopsy of liver      Liver cirrhosis secondary to ANGULO (H)      Liver transplanted (H) 08/18/2019     Lymphedema      Osteoarthritis      SBP (spontaneous bacterial peritonitis) (H) 08/02/2019     Thrombocytopenia (H)      Thyroid disease        Past Surgical History:    Past Surgical History:   Procedure Laterality Date     ARTHROPLASTY KNEE Right 10/23/2020    Procedure: Right  total knee arthroplasty;  Surgeon: Mario Wallace MD;  [facility]: [facility]     BENCH LIVER N/A 8/18/2019    Procedure: BACKBENCH PREPARATION, LIVER;  Surgeon: Mandeep Alford MD;  [facility]: [facility]     COLONOSCOPY N/A 6/22/2018    Procedure: COLONOSCOPY;  colonoscopy;  Surgeon: Hugo Patel MD;  [facility]     ENDOSCOPIC RETROGRADE CHOLANGIOPANCREATOGRAM N/A 2/10/2020    Procedure: ENDOSCOPIC RETROGRADE CHOLANGIOPANCREATOGRAPHY with spinchterotomy;  Surgeon: Guru Rigoberto Canada MD;  [facility]     ENDOSCOPIC RETROGRADE CHOLANGIOPANCREATOGRAM N/A 5/13/2020    Procedure: ENDOSCOPIC RETROGRADE CHOLANGIOPANCREATOGRAPHY,Stent exchange and sludge removal;  Surgeon: Guru Rigoberto Canada MD;  [facility]     ENDOSCOPIC RETROGRADE CHOLANGIOPANCREATOGRAPHY, EXCHANGE TUBE/STENT N/A 3/4/2020    Procedure: ENDOSCOPIC RETROGRADE CHOLANGIOPANCREATOGRAPHY, WITH biliary dilarion, stent exchange, stone removal;  Surgeon: Guru Rigoberto Canada MD;  [facility]     ESOPHAGOSCOPY, GASTROSCOPY, DUODENOSCOPY (EGD), COMBINED N/A 10/31/2019    Procedure: Esophagogastroduodenoscopy, With Biopsy;  Surgeon: Nerissa Aranda MD;  [facility]: [facility] GI     IR FEEDING TUBE PLACEMENT W MOHAN/MD  2019     IR FLUORO 0-1 HOUR  2019     IR LUMBAR PUNCTURE  2019     KNEE SURGERY  10/23/20     TRANSPLANT LIVER RECIPIENT  DONOR N/A 2019    Procedure: TRANSPLANT, LIVER, RECIPIENT,  DONOR;  Surgeon: Mandeep Alford MD;  [facility]       Family History:    Family History   Problem Relation Age of Onset     Diabetes Maternal Grandfather         Diagnosed at age 83     Cirrhosis No family hx of      Liver Cancer No family hx of        Social History:  Marital Status:   [2]  Social History     Tobacco Use     Smoking status: Former Smoker     Packs/day: 0.50     Years: 10.00     Pack years: 5.00     Types: Cigarettes     Start date: 2000     Quit date:      Years since quittin.1     Smokeless tobacco: Never Used   Substance Use Topics     Alcohol use: No     Drug use: No        Medications:         acetaminophen (TYLENOL) 325 MG tablet       aspirin (ASA) 81 MG EC tablet       Cholecalciferol (VITAMIN D-3) 25 MCG (1000 UT) CAPS       cycloSPORINE modified (GENERIC EQUIVALENT) 25 MG capsule       dexamethasone (DECADRON) 4 MG tablet       famotidine (PEPCID) 20 MG tablet       levothyroxine (SYNTHROID/LEVOTHROID) 125 MCG tablet          Review of Systems  All other systems reviewed and are negative.    Physical Exam   BP: 123/83  Pulse: 113  Temp: 101.6  F (38.7  C)  Resp: 18  Height: 157.5 cm (5' 2\")  Weight: 89.8 kg (198 lb)(stated)  SpO2: 97 %      Physical " Exam  Nontoxic appearing no respiratory distress alert and oriented ×3  Head atraumatic normocephalic  Neck supple full active painless range of motion  Lungs clear to auscultation  Heart regular no murmur  Abdomen soft moderate tenderness right and left lower quadrants, positive rebound, bowel sounds diminished, nondistended  Strength and sensation grossly intact throughout the extremities, gait and station normal  Speech is fluent, good eye contact, thought processes are rational      ED Course        Procedures               Critical Care time:  none               Results for orders placed or performed during the hospital encounter of 12/05/20 (from the past 24 hour(s))   Comprehensive metabolic panel   Result Value Ref Range    Sodium 138 133 - 144 mmol/L    Potassium 3.6 3.4 - 5.3 mmol/L    Chloride 106 94 - 109 mmol/L    Carbon Dioxide 28 20 - 32 mmol/L    Anion Gap 4 3 - 14 mmol/L    Glucose 85 70 - 99 mg/dL    Urea Nitrogen 36 (H) 7 - 30 mg/dL    Creatinine 1.07 (H) 0.52 - 1.04 mg/dL    GFR Estimate 56 (L) >60 mL/min/[1.73_m2]    GFR Estimate If Black 65 >60 mL/min/[1.73_m2]    Calcium 8.7 8.5 - 10.1 mg/dL    Bilirubin Total 2.0 (H) 0.2 - 1.3 mg/dL    Albumin 3.2 (L) 3.4 - 5.0 g/dL    Protein Total 7.2 6.8 - 8.8 g/dL    Alkaline Phosphatase 140 40 - 150 U/L    ALT 30 0 - 50 U/L    AST 15 0 - 45 U/L   CBC with platelets differential   Result Value Ref Range    WBC 10.3 4.0 - 11.0 10e9/L    RBC Count 3.72 (L) 3.8 - 5.2 10e12/L    Hemoglobin 12.1 11.7 - 15.7 g/dL    Hematocrit 36.5 35.0 - 47.0 %    MCV 98 78 - 100 fl    MCH 32.5 26.5 - 33.0 pg    MCHC 33.2 31.5 - 36.5 g/dL    RDW 12.5 10.0 - 15.0 %    Platelet Count 62 (L) 150 - 450 10e9/L    Diff Method Automated Method     % Neutrophils 81.2 %    % Lymphocytes 7.3 %    % Monocytes 10.1 %    % Eosinophils 0.6 %    % Basophils 0.1 %    % Immature Granulocytes 0.7 %    Nucleated RBCs 0 0 /100    Absolute Neutrophil 8.3 1.6 - 8.3 10e9/L    Absolute Lymphocytes  0.8 0.8 - 5.3 10e9/L    Absolute Monocytes 1.0 0.0 - 1.3 10e9/L    Absolute Eosinophils 0.1 0.0 - 0.7 10e9/L    Absolute Basophils 0.0 0.0 - 0.2 10e9/L    Abs Immature Granulocytes 0.1 0 - 0.4 10e9/L    Absolute Nucleated RBC 0.0    Lactate for Sepsis Protocol   Result Value Ref Range    Lactate for Sepsis Protocol 0.8 0.7 - 2.0 mmol/L   Lipase   Result Value Ref Range    Lipase 61 (L) 73 - 393 U/L   UA reflex to Microscopic   Result Value Ref Range    Color Urine Yellow     Appearance Urine Clear     Glucose Urine Negative NEG^Negative mg/dL    Bilirubin Urine Negative NEG^Negative    Ketones Urine Negative NEG^Negative mg/dL    Specific Gravity Urine 1.015 1.003 - 1.035    Blood Urine Negative NEG^Negative    pH Urine 5.0 5.0 - 7.0 pH    Protein Albumin Urine Negative NEG^Negative mg/dL    Urobilinogen mg/dL 0.0 0.0 - 2.0 mg/dL    Nitrite Urine Negative NEG^Negative    Leukocyte Esterase Urine Negative NEG^Negative    Source Midstream Urine    CT Abdomen Pelvis w Contrast    Narrative    EXAM: CT ABDOMEN PELVIS W CONTRAST  LOCATION: [facility]  DATE/TIME: 12/5/2020 7:35 PM    INDICATION: Lower abdominal pain.  COMPARISON: 07/31/2019.  TECHNIQUE: CT scan of the abdomen and pelvis was performed following injection of IV contrast. Multiplanar reformats were obtained. Dose reduction techniques were used.  CONTRAST: 94 mL Isovue-370.    FINDINGS:   LOWER CHEST: Normal.    HEPATOBILIARY: Interval hepatic transplant. No ascites.    PANCREAS: Normal.    SPLEEN: Normal.    ADRENAL GLANDS: Normal.    KIDNEYS/BLADDER: Normal.    BOWEL: Multiple sigmoid diverticula present.    LYMPH NODES: Normal.    VASCULATURE: Unremarkable.    PELVIC ORGANS: Urinary bladder wall thickening with soft tissue haziness around the bladder suspicious for cystitis. There is a small 2.0 x 1.5 cm fluid collection between the bladder and anterior body of the uterus with early forming wall suspicious for   abscess.    MUSCULOSKELETAL:  Normal.      Impression    IMPRESSION:   1.  Urinary bladder wall thickening with soft tissue stranding around the bladder suspicious for cystitis. Small 2.0 x 1.5 cm fluid collection between the bladder and the uterus with early forming wall suspicious for possible abscess. There are multiple   colonic diverticula present with the pelvic soft tissue stranding abutting the sigmoid colon. It is possible there has been locally perforated sigmoid diverticulitis as a cause of the presumed small forming abscess in the pelvis. There are at least 2   tiny locules of air within the fluid collection.       Medications   sodium chloride (PF) 0.9% PF flush 3 mL (has no administration in time range)   sodium chloride (PF) 0.9% PF flush 3 mL (has no administration in time range)   metroNIDAZOLE (FLAGYL) infusion 500 mg (500 mg Intravenous New Bag 12/5/20 2122)   0.9% sodium chloride BOLUS (0 mLs Intravenous Stopped 12/5/20 1959)   iopamidol (ISOVUE-370) solution 94 mL (94 mLs Intravenous Given 12/5/20 1944)   cefTRIAXone in d5w (ROCEPHIN) intermittent infusion 1 g (0 g Intravenous Stopped 12/5/20 2122)   HYDROmorphone (PF) (DILAUDID) injection 0.5 mg (0.5 mg Intravenous Given 12/5/20 2038)       Assessments & Plan (with Medical Decision Making)  60-year-old female 1 year and 3 months out from liver transplant secondary to ANGULO presents with low abdominal pain details per HPI.  Usual differential considered.  CT scan abdomen pelvis significant for sigmoid diverticulitis, appears to be microperforation with a couple of air bubbles, possibly early abscess 2 x 1.5 cm fluid collection between the bladder and the uterus.  Patient is reviewed with Dr. Sanders hepatologist through transplant team [facility].  She reviewed CT scan as well as labs and patient's medications.  She is in agreement with keeping patient here for IV antibiotic treatment.  Patient is reviewed with  general surgery on-call who reviewed case and is in agreement  with keeping patient here as well, surgery consult written for she will see patient tomorrow morning.  Patient is also reviewed with  admitting hospitalist who accepts patient.  Remained stable throughout stay, treated with ceftriaxone and metronidazole IV, liter normal saline, hydromorphone dose x1 with good pain relief.     I have reviewed the nursing notes.    I have reviewed the findings, diagnosis, plan and need for follow up with the patient.       New Prescriptions    No medications on file       Final diagnoses:   Diverticulitis of colon       12/5/2020   [facility] EMERGENCY DEPT     Fab Braxton MD  12/05/20 [id number]

## 2020-12-06 NOTE — PROGRESS NOTES
Attending MD (Dr. Mario Wallace) Attestation :  This patient was seen and evaluated by me including a history, exam, and interpretation of all imaging and/or lab data.  Either a training physician (resident/fellow), who also saw the patient, or scribe has documented the visit in the attached note.    MD Mike Del Rio Family Professor  Oncology and Adult Reconstructive Surgery  Dept Orthopaedic Surgery, [facility]  [phone] office, [phone] pager  [email or web address]

## 2020-12-07 ENCOUNTER — TELEPHONE (OUTPATIENT)
Dept: TRANSPLANT | Facility: CLINIC | Age: 60
End: 2020-12-07

## 2020-12-07 LAB
ALBUMIN SERPL-MCNC: 2.5 G/DL (ref 3.4–5)
ALP SERPL-CCNC: 119 U/L (ref 40–150)
ALT SERPL W P-5'-P-CCNC: 42 U/L (ref 0–50)
ANION GAP SERPL CALCULATED.3IONS-SCNC: 5 MMOL/L (ref 3–14)
AST SERPL W P-5'-P-CCNC: 39 U/L (ref 0–45)
AT III ACT/NOR PPP CHRO: 98 % (ref 85–135)
BASOPHILS # BLD AUTO: 0 10E9/L (ref 0–0.2)
BASOPHILS NFR BLD AUTO: 0.2 %
BILIRUB DIRECT SERPL-MCNC: 0.5 MG/DL (ref 0–0.2)
BILIRUB SERPL-MCNC: 1.9 MG/DL (ref 0.2–1.3)
BUN SERPL-MCNC: 17 MG/DL (ref 7–30)
CALCIUM SERPL-MCNC: 8.4 MG/DL (ref 8.5–10.1)
CHLORIDE SERPL-SCNC: 102 MMOL/L (ref 94–109)
CO2 SERPL-SCNC: 30 MMOL/L (ref 20–32)
CREAT SERPL-MCNC: 0.99 MG/DL (ref 0.52–1.04)
CRP SERPL-MCNC: 210 MG/L (ref 0–8)
CYCLOSPORINE BLD LC/MS/MS-MCNC: 87 UG/L (ref 50–400)
D DIMER PPP FEU-MCNC: 2.6 UG/ML FEU (ref 0–0.5)
DIFFERENTIAL METHOD BLD: ABNORMAL
EOSINOPHIL # BLD AUTO: 0.3 10E9/L (ref 0–0.7)
EOSINOPHIL NFR BLD AUTO: 2.2 %
ERYTHROCYTE [DISTWIDTH] IN BLOOD BY AUTOMATED COUNT: 12.4 % (ref 10–15)
FIBRINOGEN PPP-MCNC: 672 MG/DL (ref 200–420)
GFR SERPL CREATININE-BSD FRML MDRD: 61 ML/MIN/{1.73_M2}
GLUCOSE SERPL-MCNC: 79 MG/DL (ref 70–99)
HCT VFR BLD AUTO: 33.6 % (ref 35–47)
HGB BLD-MCNC: 11.1 G/DL (ref 11.7–15.7)
IL6 SERPL-MCNC: 168.22 PG/ML
IMM GRANULOCYTES # BLD: 0.2 10E9/L (ref 0–0.4)
IMM GRANULOCYTES NFR BLD: 1.3 %
INR PPP: 1.3 (ref 0.86–1.14)
LDH SERPL L TO P-CCNC: 147 U/L (ref 81–234)
LYMPHOCYTES # BLD AUTO: 0.8 10E9/L (ref 0.8–5.3)
LYMPHOCYTES NFR BLD AUTO: 5.9 %
MCH RBC QN AUTO: 32.6 PG (ref 26.5–33)
MCHC RBC AUTO-ENTMCNC: 33 G/DL (ref 31.5–36.5)
MCV RBC AUTO: 99 FL (ref 78–100)
MONOCYTES # BLD AUTO: 1.1 10E9/L (ref 0–1.3)
MONOCYTES NFR BLD AUTO: 8.7 %
NEUTROPHILS # BLD AUTO: 10.5 10E9/L (ref 1.6–8.3)
NEUTROPHILS NFR BLD AUTO: 81.7 %
NRBC # BLD AUTO: 0 10*3/UL
NRBC BLD AUTO-RTO: 0 /100
PLATELET # BLD AUTO: 72 10E9/L (ref 150–450)
POTASSIUM SERPL-SCNC: 4 MMOL/L (ref 3.4–5.3)
PROT SERPL-MCNC: 6.2 G/DL (ref 6.8–8.8)
RBC # BLD AUTO: 3.4 10E12/L (ref 3.8–5.2)
RETICS # AUTO: 53 10E9/L (ref 25–95)
RETICS/RBC NFR AUTO: 1.6 % (ref 0.5–2)
SODIUM SERPL-SCNC: 137 MMOL/L (ref 133–144)
TME LAST DOSE: 1700 H
TROPONIN I SERPL-MCNC: <0.015 UG/L (ref 0–0.04)
WBC # BLD AUTO: 12.9 10E9/L (ref 4–11)

## 2020-12-07 PROCEDURE — 250N000013 HC RX MED GY IP 250 OP 250 PS 637: Performed by: FAMILY MEDICINE

## 2020-12-07 PROCEDURE — 80076 HEPATIC FUNCTION PANEL: CPT | Performed by: FAMILY MEDICINE

## 2020-12-07 PROCEDURE — 250N000011 HC RX IP 250 OP 636: Performed by: EMERGENCY MEDICINE

## 2020-12-07 PROCEDURE — 250N000011 HC RX IP 250 OP 636: Performed by: FAMILY MEDICINE

## 2020-12-07 PROCEDURE — 84484 ASSAY OF TROPONIN QUANT: CPT | Performed by: FAMILY MEDICINE

## 2020-12-07 PROCEDURE — 85025 COMPLETE CBC W/AUTO DIFF WBC: CPT | Performed by: FAMILY MEDICINE

## 2020-12-07 PROCEDURE — 83615 LACTATE (LD) (LDH) ENZYME: CPT | Performed by: FAMILY MEDICINE

## 2020-12-07 PROCEDURE — 120N000001 HC R&B MED SURG/OB

## 2020-12-07 PROCEDURE — 85379 FIBRIN DEGRADATION QUANT: CPT | Performed by: FAMILY MEDICINE

## 2020-12-07 PROCEDURE — 80048 BASIC METABOLIC PNL TOTAL CA: CPT | Performed by: FAMILY MEDICINE

## 2020-12-07 PROCEDURE — 250N000012 HC RX MED GY IP 250 OP 636 PS 637: Performed by: FAMILY MEDICINE

## 2020-12-07 PROCEDURE — 99233 SBSQ HOSP IP/OBS HIGH 50: CPT | Performed by: FAMILY MEDICINE

## 2020-12-07 PROCEDURE — 85045 AUTOMATED RETICULOCYTE COUNT: CPT | Performed by: FAMILY MEDICINE

## 2020-12-07 PROCEDURE — 86140 C-REACTIVE PROTEIN: CPT | Performed by: FAMILY MEDICINE

## 2020-12-07 PROCEDURE — 85610 PROTHROMBIN TIME: CPT | Performed by: FAMILY MEDICINE

## 2020-12-07 PROCEDURE — 36415 COLL VENOUS BLD VENIPUNCTURE: CPT | Performed by: FAMILY MEDICINE

## 2020-12-07 PROCEDURE — 85384 FIBRINOGEN ACTIVITY: CPT | Performed by: FAMILY MEDICINE

## 2020-12-07 RX ORDER — CEFTRIAXONE SODIUM 1 G/50ML
1 INJECTION, SOLUTION INTRAVENOUS EVERY 24 HOURS
Status: DISCONTINUED | OUTPATIENT
Start: 2020-12-07 | End: 2020-12-10

## 2020-12-07 RX ADMIN — METRONIDAZOLE 500 MG: 500 INJECTION, SOLUTION INTRAVENOUS at 05:59

## 2020-12-07 RX ADMIN — HYDROMORPHONE HYDROCHLORIDE 0.5 MG: 1 INJECTION, SOLUTION INTRAMUSCULAR; INTRAVENOUS; SUBCUTANEOUS at 23:36

## 2020-12-07 RX ADMIN — METRONIDAZOLE 500 MG: 500 INJECTION, SOLUTION INTRAVENOUS at 14:14

## 2020-12-07 RX ADMIN — METRONIDAZOLE 500 MG: 500 INJECTION, SOLUTION INTRAVENOUS at 22:11

## 2020-12-07 RX ADMIN — CYCLOSPORINE 75 MG: 25 CAPSULE, LIQUID FILLED ORAL at 09:50

## 2020-12-07 RX ADMIN — CEFTRIAXONE SODIUM 1 G: 1 INJECTION, SOLUTION INTRAVENOUS at 10:23

## 2020-12-07 RX ADMIN — LEVOTHYROXINE SODIUM 125 MCG: 125 TABLET ORAL at 09:04

## 2020-12-07 RX ADMIN — HYDROMORPHONE HYDROCHLORIDE 0.5 MG: 1 INJECTION, SOLUTION INTRAMUSCULAR; INTRAVENOUS; SUBCUTANEOUS at 00:31

## 2020-12-07 RX ADMIN — HYDROMORPHONE HYDROCHLORIDE 0.5 MG: 1 INJECTION, SOLUTION INTRAMUSCULAR; INTRAVENOUS; SUBCUTANEOUS at 06:59

## 2020-12-07 RX ADMIN — FAMOTIDINE 20 MG: 20 TABLET ORAL at 16:17

## 2020-12-07 RX ADMIN — FAMOTIDINE 20 MG: 20 TABLET ORAL at 09:04

## 2020-12-07 RX ADMIN — HYDROMORPHONE HYDROCHLORIDE 0.5 MG: 1 INJECTION, SOLUTION INTRAMUSCULAR; INTRAVENOUS; SUBCUTANEOUS at 16:17

## 2020-12-07 RX ADMIN — CYCLOSPORINE 75 MG: 25 CAPSULE, LIQUID FILLED ORAL at 22:12

## 2020-12-07 ASSESSMENT — ACTIVITIES OF DAILY LIVING (ADL)
ADLS_ACUITY_SCORE: 14
ADLS_ACUITY_SCORE: 14
ADLS_ACUITY_SCORE: 15
ADLS_ACUITY_SCORE: 14

## 2020-12-07 NOTE — PLAN OF CARE
Patient alert and oriented.  Uses call light appropriately and makes needs known.  Patient continues to have abdominal pain and fullness.  Requested pain medication for abdominal pain rated 6/10.  Dilaudid 0.5 mg given IV with good relief and rating 2/10 after given.  Patient up independently to bathroom with cane.  Steady on feet.  Encouraged patient to call if dizzy or feeling unsteady.  Patient agreed.  NPO after midnight for procedure tomorrow.  Patient aware.  Encouraged patient to call for any needs that may arise.  Patient agreed.

## 2020-12-07 NOTE — PROGRESS NOTES
"Patient noted that her urine appears to be \"darker\" since she has been NPO but has no other urinary symptoms. Patient is having some pain but it is worse with activity. She wanted to wait a little longer before getting another dose of dilaudid. IV flagyl given as ordered.   Remains afebrile, no cough, lungs clear.  "

## 2020-12-07 NOTE — PROGRESS NOTES
[facility]           Assessment & Plan      Nicci Pemberton is a 60 year old female who presents on 12/5/2020 with lower abdominal pain, low-grade subjective fever and 3 episodes of watery diarrhea       Diverticulitis of colon with abscess  12/5/20 -- Patient presents with lower abdominal pain and low-grade subjective fever at 99.9 associated with 3 episodes of watery diarrhea.     CBC showed normal white blood count and normal hemoglobin.  Lactate is normal, urinalysis showed no evidence of infection.  CT scan abdomen pelvis showed Urinary bladder wall thickening with soft tissue stranding around the bladder suspicious for cystitis. Small 2.0 x 1.5 cm fluid collection between the bladder and the uterus with early forming wall suspicious for possible abscess. There are multiple   colonic diverticula present with the pelvic soft tissue stranding abutting the sigmoid colon. It is possible there has been locally perforated sigmoid diverticulitis as a cause of the presumed small forming abscess in the pelvis. There are at least 2   tiny locules of air within the fluid collection  Patient was started on ceftriaxone and Flagyl  General surgery was consulted by the ED provider and they agree with the current plan of care, they will see the patient in the morning  Plan:  Continue ceftriaxone and Flagyl  Bowel rest, will keep the patient n.p.o. with exception for ice chips and meds  Follow-up with surgery recommendation  C. difficile ordered given diarrhea, previous history of C. difficile infection and immunosuppressive states.  12/6/2020 -- overall clinically unchanged, WBC up but fever improved.  Continue rocephin and flagyl for now.     12/7/2020 -- Discussed with radiology - not able to tap/drain the abscess given size/location.  no diarrheal stool to send for C.Diff, does not look like that is a concern here.  Cancelled.  Afebrile since admission. CRP up, but unclear if that's due to her  abscess or COVID-19 but overall looks like she's clinically improving.  Continue antibiotics,       Acute COVID-19 infection   12/7/2020 -- COVID screen positive, unclear if this was contributing at all to her symptoms on presentation, but seems like the fever and abdominal pain were much more likely due to her diverticulitis/abscess than due to the COVID-19.  Inflammatory markers up, but not needing oxygen.  Hold off on dexamethasone/remdesivir for now.          Status post liver transplantation (H)    Immunosuppressed status (H)  12/5/20 -- History of liver transplant secondary to ANGULO.  On cyclosporine 75 mg twice daily  Transplant hematology was contacted by the ED provider and the okay with admission to our hospital.  They also okay with the current plan of care  -Resume home medication cyclosporine 75 mg twice daily  12/7/2020 -- Cyclosporin level normal.  Discussed with liver transplant team from [facility].  They are happy with cyclospiron dose/level for now, recommend recheck of cyclosporin level on Thurs or Fri if patient still inpatient.   Follow daily liver labs - they are happy with bili where it is with normal liver enzymes.  Said no changes or reason for transfer at present, they will follow labs and remain available.         Hypothyroidism   Continue home Synthroid       Lymphedema  12/5/20 Patient has lymphedema wrap in place  12/7/2020 -- appears baseline.  Daily weights requested.        Thrombocytopenia (H)  12/5/20 -- Chronic problem.  Platelet on admission 62.  No current issues with bleeding.   12/7/2020 -- stable.   Has appointment with Dr. Mckeon scheduled for 12/10/20.       Elevated bilirubin, s/p hepatic transplant  12/6/2020 -- nothing on CT scan, looks like bili has typically been 1.5-2, improved today.  Follow.  12/7/2020 -- overall stable so far.    Discuss with transplant team if worsens as above.       Diet:  regular      DVT Prophylaxis: Pneumatic Compression Devices given  thrombocytopenia, but if platelets continue to improve likely start lovenox tomorrow given increased thrombotic risk with COVID/hospitalization.      Forrseter Catheter: not present     Code Status:   Full code      Lines: IV line            Disposition  Anticipate at least 2 more days inpatient.              Interval History:   Afebrile.  Abdominal pain unchanged.  No dyspnea, no cough or other URI symptoms.  No diarrhea.    No other pain             Review of Systems:    ROS: 10 point ROS neg other than the symptoms noted above in the HPI.           Medications:   Current active medications and PTA medications reviewed, see medication list for details.            Physical Exam:   Vitals were reviewed  Patient Vitals for the past 24 hrs:   BP Temp Temp src Pulse Resp SpO2   20 0814 110/65 98.8  F (37.1  C) Oral 80 18 97 %   20 0400 130/70 98.8  F (37.1  C) Oral 80 20 95 %   20 0053 -- -- -- -- 20 --   20 0031 -- -- -- -- 20 --   20 2330 125/70 98.7  F (37.1  C) Oral 85 20 96 %   20 2017 121/75 98.4  F (36.9  C) Oral 89 18 97 %   20 1541 132/69 97.9  F (36.6  C) Oral 85 18 98 %   20 1114 110/66 98.4  F (36.9  C) Oral 84 18 95 %       Temperatures:  Current - Temp: 98.8  F (37.1  C); Max - Temp  Av.5  F (36.9  C)  Min: 97.9  F (36.6  C)  Max: 98.8  F (37.1  C)  Respiration range: Resp  Av  Min: 18  Max: 20  Pulse range: Pulse  Av.8  Min: 80  Max: 89  Blood pressure range: Systolic (24hrs), Av , Min:110 , Max:132   ; Diastolic (24hrs), Av, Min:65, Max:75    Pulse oximetry range: SpO2  Av.3 %  Min: 95 %  Max: 98 %  No intake/output data recorded.  No intake or output data in the 24 hours ending 20 0925  EXAM:  General: awake and alert, NAD, oriented x 3  Head: normocephalic  Neck: unremarkable, no lymphadenopathy   HEENT: oropharynx pink and moist    Heart: Regular rate and rhythm, no murmurs, rubs, or gallops  Lungs: clear to auscultation  bilaterally with good air movement throughout  Abdomen: soft, still somewhat tender across lower abdomen, no rebound or guarding, non-distended,  no masses or organomegaly  Extremities: no edema in lower extremities   Skin unremarkable.               Data:     Results for orders placed or performed during the hospital encounter of 12/05/20 (from the past 24 hour(s))   Reticulocyte count   Result Value Ref Range    % Retic 1.3 0.5 - 2.0 %    Absolute Retic 46.5 25 - 95 10e9/L   Lactate Dehydrogenase   Result Value Ref Range    Lactate Dehydrogenase 159 81 - 234 U/L   Troponin I   Result Value Ref Range    Troponin I ES <0.015 0.000 - 0.045 ug/L   CK total   Result Value Ref Range    CK Total 21 (L) 30 - 225 U/L   INR   Result Value Ref Range    INR 1.22 (H) 0.86 - 1.14   Partial thromboplastin time   Result Value Ref Range    PTT 36 22 - 37 sec   Fibrinogen activity   Result Value Ref Range    Fibrinogen 592 (H) 200 - 420 mg/dL   D dimer quantitative   Result Value Ref Range    D Dimer 2.6 (H) 0.0 - 0.50 ug/ml FEU   Ferritin   Result Value Ref Range    Ferritin 553 (H) 8 - 252 ng/mL   Basic metabolic panel   Result Value Ref Range    Sodium 137 133 - 144 mmol/L    Potassium 4.0 3.4 - 5.3 mmol/L    Chloride 102 94 - 109 mmol/L    Carbon Dioxide 30 20 - 32 mmol/L    Anion Gap 5 3 - 14 mmol/L    Glucose 79 70 - 99 mg/dL    Urea Nitrogen 17 7 - 30 mg/dL    Creatinine 0.99 0.52 - 1.04 mg/dL    GFR Estimate 61 >60 mL/min/[1.73_m2]    GFR Estimate If Black 71 >60 mL/min/[1.73_m2]    Calcium 8.4 (L) 8.5 - 10.1 mg/dL   CRP inflammation   Result Value Ref Range    CRP Inflammation 210.0 (H) 0.0 - 8.0 mg/L   Hepatic panel   Result Value Ref Range    Bilirubin Direct 0.5 (H) 0.0 - 0.2 mg/dL    Bilirubin Total 1.9 (H) 0.2 - 1.3 mg/dL    Albumin 2.5 (L) 3.4 - 5.0 g/dL    Protein Total 6.2 (L) 6.8 - 8.8 g/dL    Alkaline Phosphatase 119 40 - 150 U/L    ALT 42 0 - 50 U/L    AST 39 0 - 45 U/L   CBC with Platelets & Differential    Result Value Ref Range    WBC 12.9 (H) 4.0 - 11.0 10e9/L    RBC Count 3.40 (L) 3.8 - 5.2 10e12/L    Hemoglobin 11.1 (L) 11.7 - 15.7 g/dL    Hematocrit 33.6 (L) 35.0 - 47.0 %    MCV 99 78 - 100 fl    MCH 32.6 26.5 - 33.0 pg    MCHC 33.0 31.5 - 36.5 g/dL    RDW 12.4 10.0 - 15.0 %    Platelet Count 72 (L) 150 - 450 10e9/L    Diff Method Automated Method     % Neutrophils 81.7 %    % Lymphocytes 5.9 %    % Monocytes 8.7 %    % Eosinophils 2.2 %    % Basophils 0.2 %    % Immature Granulocytes 1.3 %    Nucleated RBCs 0 0 /100    Absolute Neutrophil 10.5 (H) 1.6 - 8.3 10e9/L    Absolute Lymphocytes 0.8 0.8 - 5.3 10e9/L    Absolute Monocytes 1.1 0.0 - 1.3 10e9/L    Absolute Eosinophils 0.3 0.0 - 0.7 10e9/L    Absolute Basophils 0.0 0.0 - 0.2 10e9/L    Abs Immature Granulocytes 0.2 0 - 0.4 10e9/L    Absolute Nucleated RBC 0.0    Reticulocyte count   Result Value Ref Range    % Retic 1.6 0.5 - 2.0 %    Absolute Retic 53.0 25 - 95 10e9/L   Lactate Dehydrogenase   Result Value Ref Range    Lactate Dehydrogenase 147 81 - 234 U/L   INR   Result Value Ref Range    INR 1.30 (H) 0.86 - 1.14   Fibrinogen activity   Result Value Ref Range    Fibrinogen 672 (H) 200 - 420 mg/dL   D dimer quantitative   Result Value Ref Range    D Dimer 2.6 (H) 0.0 - 0.50 ug/ml FEU   Troponin I   Result Value Ref Range    Troponin I ES <0.015 0.000 - 0.045 ug/L         Attestation:  I have reviewed today's vital signs, notes, medications, labs and imaging.  Amount of time spent in direct patient care: 40 minutes.     Germain Mckenna MD, MD

## 2020-12-07 NOTE — PLAN OF CARE
Requested pain medication for abdomen lower. IV dilaudid given at 1611.    Up independently in room.     Tolerating clear liquids.       Fernanda Arellano RN on 12/7/2020 at 11:26 PM

## 2020-12-07 NOTE — PLAN OF CARE
Patient alert and orientated. Vital signs stable. On room air. Afebrile. Up independently with a cane.     Patient reports abdominal pain, and requests pain medication as needed. Scheduled IV antibiotics given. Patient tolerating clear liquid diet.      Special precautions maintained.

## 2020-12-08 LAB
ALBUMIN SERPL-MCNC: 2.4 G/DL (ref 3.4–5)
ALP SERPL-CCNC: 115 U/L (ref 40–150)
ALT SERPL W P-5'-P-CCNC: 58 U/L (ref 0–50)
ANION GAP SERPL CALCULATED.3IONS-SCNC: 5 MMOL/L (ref 3–14)
AST SERPL W P-5'-P-CCNC: 67 U/L (ref 0–45)
BASOPHILS # BLD AUTO: 0 10E9/L (ref 0–0.2)
BASOPHILS NFR BLD AUTO: 0.1 %
BILIRUB SERPL-MCNC: 1.1 MG/DL (ref 0.2–1.3)
BUN SERPL-MCNC: 14 MG/DL (ref 7–30)
CALCIUM SERPL-MCNC: 8.1 MG/DL (ref 8.5–10.1)
CHLORIDE SERPL-SCNC: 103 MMOL/L (ref 94–109)
CO2 SERPL-SCNC: 29 MMOL/L (ref 20–32)
CREAT SERPL-MCNC: 0.93 MG/DL (ref 0.52–1.04)
CRP SERPL-MCNC: 165 MG/L (ref 0–8)
D DIMER PPP FEU-MCNC: 2.7 UG/ML FEU (ref 0–0.5)
DIFFERENTIAL METHOD BLD: ABNORMAL
EOSINOPHIL # BLD AUTO: 0.3 10E9/L (ref 0–0.7)
EOSINOPHIL NFR BLD AUTO: 3.5 %
ERYTHROCYTE [DISTWIDTH] IN BLOOD BY AUTOMATED COUNT: 12.1 % (ref 10–15)
FIBRINOGEN PPP-MCNC: 663 MG/DL (ref 200–420)
GFR SERPL CREATININE-BSD FRML MDRD: 67 ML/MIN/{1.73_M2}
GLUCOSE SERPL-MCNC: 98 MG/DL (ref 70–99)
HCT VFR BLD AUTO: 31.2 % (ref 35–47)
HGB BLD-MCNC: 10.4 G/DL (ref 11.7–15.7)
IMM GRANULOCYTES # BLD: 0.1 10E9/L (ref 0–0.4)
IMM GRANULOCYTES NFR BLD: 1.3 %
INR PPP: 1.32 (ref 0.86–1.14)
LDH SERPL L TO P-CCNC: 161 U/L (ref 81–234)
LYMPHOCYTES # BLD AUTO: 0.7 10E9/L (ref 0.8–5.3)
LYMPHOCYTES NFR BLD AUTO: 8.8 %
MCH RBC QN AUTO: 32.7 PG (ref 26.5–33)
MCHC RBC AUTO-ENTMCNC: 33.3 G/DL (ref 31.5–36.5)
MCV RBC AUTO: 98 FL (ref 78–100)
MONOCYTES # BLD AUTO: 0.8 10E9/L (ref 0–1.3)
MONOCYTES NFR BLD AUTO: 9.6 %
NEUTROPHILS # BLD AUTO: 6.1 10E9/L (ref 1.6–8.3)
NEUTROPHILS NFR BLD AUTO: 76.7 %
NRBC # BLD AUTO: 0 10*3/UL
NRBC BLD AUTO-RTO: 0 /100
PLATELET # BLD AUTO: 94 10E9/L (ref 150–450)
POTASSIUM SERPL-SCNC: 3.7 MMOL/L (ref 3.4–5.3)
PROT SERPL-MCNC: 6 G/DL (ref 6.8–8.8)
RBC # BLD AUTO: 3.18 10E12/L (ref 3.8–5.2)
RETICS # AUTO: 49.6 10E9/L (ref 25–95)
RETICS/RBC NFR AUTO: 1.6 % (ref 0.5–2)
SODIUM SERPL-SCNC: 137 MMOL/L (ref 133–144)
WBC # BLD AUTO: 8 10E9/L (ref 4–11)

## 2020-12-08 PROCEDURE — 250N000013 HC RX MED GY IP 250 OP 250 PS 637: Performed by: FAMILY MEDICINE

## 2020-12-08 PROCEDURE — 85045 AUTOMATED RETICULOCYTE COUNT: CPT | Performed by: FAMILY MEDICINE

## 2020-12-08 PROCEDURE — 86140 C-REACTIVE PROTEIN: CPT | Performed by: FAMILY MEDICINE

## 2020-12-08 PROCEDURE — 99233 SBSQ HOSP IP/OBS HIGH 50: CPT | Performed by: FAMILY MEDICINE

## 2020-12-08 PROCEDURE — 83615 LACTATE (LD) (LDH) ENZYME: CPT | Performed by: FAMILY MEDICINE

## 2020-12-08 PROCEDURE — 85384 FIBRINOGEN ACTIVITY: CPT | Performed by: FAMILY MEDICINE

## 2020-12-08 PROCEDURE — 80053 COMPREHEN METABOLIC PANEL: CPT | Performed by: FAMILY MEDICINE

## 2020-12-08 PROCEDURE — 250N000012 HC RX MED GY IP 250 OP 636 PS 637: Performed by: FAMILY MEDICINE

## 2020-12-08 PROCEDURE — 85025 COMPLETE CBC W/AUTO DIFF WBC: CPT | Performed by: FAMILY MEDICINE

## 2020-12-08 PROCEDURE — 250N000011 HC RX IP 250 OP 636: Performed by: FAMILY MEDICINE

## 2020-12-08 PROCEDURE — 85610 PROTHROMBIN TIME: CPT | Performed by: FAMILY MEDICINE

## 2020-12-08 PROCEDURE — 85379 FIBRIN DEGRADATION QUANT: CPT | Performed by: FAMILY MEDICINE

## 2020-12-08 PROCEDURE — 250N000011 HC RX IP 250 OP 636: Performed by: EMERGENCY MEDICINE

## 2020-12-08 PROCEDURE — 36415 COLL VENOUS BLD VENIPUNCTURE: CPT | Performed by: FAMILY MEDICINE

## 2020-12-08 PROCEDURE — 120N000001 HC R&B MED SURG/OB

## 2020-12-08 RX ORDER — VITAMIN B COMPLEX
1000 TABLET ORAL 2 TIMES DAILY
Status: DISCONTINUED | OUTPATIENT
Start: 2020-12-08 | End: 2020-12-11 | Stop reason: HOSPADM

## 2020-12-08 RX ADMIN — METRONIDAZOLE 500 MG: 500 INJECTION, SOLUTION INTRAVENOUS at 13:48

## 2020-12-08 RX ADMIN — HYDROMORPHONE HYDROCHLORIDE 0.5 MG: 1 INJECTION, SOLUTION INTRAMUSCULAR; INTRAVENOUS; SUBCUTANEOUS at 04:51

## 2020-12-08 RX ADMIN — Medication 1000 UNITS: at 21:35

## 2020-12-08 RX ADMIN — CYCLOSPORINE 75 MG: 25 CAPSULE, LIQUID FILLED ORAL at 21:35

## 2020-12-08 RX ADMIN — HYDROMORPHONE HYDROCHLORIDE 0.5 MG: 1 INJECTION, SOLUTION INTRAMUSCULAR; INTRAVENOUS; SUBCUTANEOUS at 10:56

## 2020-12-08 RX ADMIN — LEVOTHYROXINE SODIUM 125 MCG: 125 TABLET ORAL at 10:55

## 2020-12-08 RX ADMIN — CEFTRIAXONE SODIUM 1 G: 1 INJECTION, SOLUTION INTRAVENOUS at 10:56

## 2020-12-08 RX ADMIN — CYCLOSPORINE 75 MG: 25 CAPSULE, LIQUID FILLED ORAL at 10:57

## 2020-12-08 RX ADMIN — ENOXAPARIN SODIUM 40 MG: 40 INJECTION SUBCUTANEOUS at 17:43

## 2020-12-08 RX ADMIN — HYDROMORPHONE HYDROCHLORIDE 0.5 MG: 1 INJECTION, SOLUTION INTRAMUSCULAR; INTRAVENOUS; SUBCUTANEOUS at 21:38

## 2020-12-08 RX ADMIN — FAMOTIDINE 20 MG: 20 TABLET ORAL at 10:55

## 2020-12-08 RX ADMIN — HYDROMORPHONE HYDROCHLORIDE 0.5 MG: 1 INJECTION, SOLUTION INTRAMUSCULAR; INTRAVENOUS; SUBCUTANEOUS at 16:33

## 2020-12-08 RX ADMIN — METRONIDAZOLE 500 MG: 500 INJECTION, SOLUTION INTRAVENOUS at 05:42

## 2020-12-08 RX ADMIN — FAMOTIDINE 20 MG: 20 TABLET ORAL at 17:43

## 2020-12-08 RX ADMIN — METRONIDAZOLE 500 MG: 500 INJECTION, SOLUTION INTRAVENOUS at 21:41

## 2020-12-08 ASSESSMENT — ACTIVITIES OF DAILY LIVING (ADL)
ADLS_ACUITY_SCORE: 14

## 2020-12-08 ASSESSMENT — MIFFLIN-ST. JEOR: SCORE: 1442.38

## 2020-12-08 NOTE — PLAN OF CARE
Problem: Pain Acute  Goal: Acceptable Pain Control and Functional Ability  Outcome: No Change     0.5 mg PRN IV Dilaudid x 2 for abdominal pain.  Peripheral IV leaking, new IV inserted.  Independently ambulated to bathroom with cane from home.  COVID precautions and enteric precautions maintained.

## 2020-12-08 NOTE — PROGRESS NOTES
[facility] Progress Note           Assessment & Plan      Nicci Pemberton is a 60 year old female who presents on 12/5/2020 with lower abdominal pain, low-grade subjective fever and 3 episodes of watery diarrhea       Diverticulitis of colon with abscess  12/5/20 -- Patient presents with lower abdominal pain and low-grade subjective fever at 99.9 associated with 3 episodes of watery diarrhea.     CBC showed normal white blood count and normal hemoglobin.  Lactate is normal, urinalysis showed no evidence of infection.  CT scan abdomen pelvis showed Urinary bladder wall thickening with soft tissue stranding around the bladder suspicious for cystitis. Small 2.0 x 1.5 cm fluid collection between the bladder and the uterus with early forming wall suspicious for possible abscess. There are multiple   colonic diverticula present with the pelvic soft tissue stranding abutting the sigmoid colon. It is possible there has been locally perforated sigmoid diverticulitis as a cause of the presumed small forming abscess in the pelvis. There are at least 2   tiny locules of air within the fluid collection  Patient was started on ceftriaxone and Flagyl  General surgery was consulted by the ED provider and they agree with the current plan of care, they will see the patient in the morning  Plan:  Continue ceftriaxone and Flagyl  Bowel rest, will keep the patient n.p.o. with exception for ice chips and meds  Follow-up with surgery recommendation  C. difficile ordered given diarrhea, previous history of C. difficile infection and immunosuppressive states.  12/6/2020 -- overall clinically unchanged, WBC up but fever improved.  Continue rocephin and flagyl for now.     12/7/2020 -- Discussed with radiology - not able to tap/drain the abscess given size/location.  no diarrheal stool to send for C.Diff, does not look like that is a concern here.  Cancelled.  Afebrile since admission. CRP up, but unclear if that's due to her  abscess or COVID-19 but overall looks like she's clinically improving.  Continue antibiotics,    12/8/2020 -- CRP and clinical appearance improving.  Continue antibiotics.  Advance diet as tolerated.  Possible discharge in 1-2 days on oral antibiotics if showing clear clinical improvement.      Acute COVID-19 infection   12/7/2020 -- COVID screen positive, unclear if this was contributing at all to her symptoms on presentation, but seems like the fever and abdominal pain were much more likely due to her diverticulitis/abscess than due to the COVID-19.  Inflammatory markers up, but not needing oxygen.    12/8/2020 -- no change.  Hold off on dexamethasone/remdesivir for now.     Will need longer isolation given immune suppressed state.         Status post liver transplantation (H)    Immunosuppressed status (H)  12/5/20 -- History of liver transplant secondary to ANGULO.  On cyclosporine 75 mg twice daily  Transplant hematology was contacted by the ED provider and the okay with admission to our hospital.  They also okay with the current plan of care  -Resume home medication cyclosporine 75 mg twice daily  12/7/2020 -- Cyclosporin level normal.  Discussed with liver transplant team from [facility].  They are happy with cyclospiron dose/level for now, recommend recheck of cyclosporin level on Thurs or Fri if patient still inpatient.   Follow daily liver labs - they are happy with bili where it is with normal liver enzymes.  Said no changes or reason for transfer at present, they will follow labs and remain available.    12/8/2020 -- bili normal, liver enzymes just faintly up - transplant following.         Hypothyroidism   Continue home Synthroid       Lymphedema  12/5/20 Patient has lymphedema wrap in place  12/8/2020 -- appears baseline.  weight stable.       Thrombocytopenia (H)  12/5/20 -- Chronic problem.  Platelet on admission 62.  No current issues with bleeding.   12/8/2020 -- stable.   Has appointment with   Linda scheduled for 12/10/20.       Elevated bilirubin, s/p hepatic transplant  2020 -- nothing on CT scan, looks like bili has typically been 1.5-2, improved today.  Follow.  2020 -- AST/ALT just faintly up, but bili normalized.  Follow.    Discuss with transplant team if worsens as above.       Diet:  regular      DVT Prophylaxis: starting lovenox with platelets improving and now nearly 100 in patient with poor mobility and known COVID-19, but will need to watch closely- likely plan to stop lovenox if platelets drop at all.       Forrester Catheter: not present     Code Status:   Full code      Lines: IV line                 Disposition  Possible discharge in 1-2 days if clearly improving.              Interval History:   Patient feels like she's improving,  Less pain but not gone, feels better overall.  No new concerns.  No dyspnea.  No fever.    No other pain             Review of Systems:    ROS: 10 point ROS neg other than the symptoms noted above in the HPI.           Medications:   Current active medications and PTA medications reviewed, see medication list for details.            Physical Exam:   Vitals were reviewed  Patient Vitals for the past 24 hrs:   BP Temp Temp src Pulse Resp SpO2 Weight   20 1517 122/83 97.6  F (36.4  C) Oral 75 18 99 % --   20 0837 123/77 98.2  F (36.8  C) Oral 77 18 99 % --   20 0518 -- -- -- -- -- -- 91.9 kg (202 lb 9.6 oz)   20 0339 110/64 98.4  F (36.9  C) Oral 80 18 96 % --   20 2335 108/60 98.4  F (36.9  C) Oral 87 18 95 % --   20 2024 115/76 98.3  F (36.8  C) Oral 89 18 99 % --       Temperatures:  Current - Temp: 97.6  F (36.4  C); Max - Temp  Av.2  F (36.8  C)  Min: 97.6  F (36.4  C)  Max: 98.4  F (36.9  C)  Respiration range: Resp  Av  Min: 18  Max: 18  Pulse range: Pulse  Av.6  Min: 75  Max: 89  Blood pressure range: Systolic (24hrs), Av , Min:108 , Max:123   ; Diastolic (24hrs), Av, Min:60,  Max:83    Pulse oximetry range: SpO2  Av.6 %  Min: 95 %  Max: 99 %  No intake/output data recorded.  No intake or output data in the 24 hours ending 20 1700  EXAM:  General: awake and alert, NAD, oriented x 3  Head: normocephalic  Neck: unremarkable, no lymphadenopathy   HEENT: oropharynx pink and moist    Heart: Regular rate and rhythm, no murmurs, rubs, or gallops  Lungs: clear to auscultation bilaterally with good air movement throughout  Abdomen: soft, mildly tender lower abdomen but certainly less so than yesterday, no masses or organomegaly  Extremities: no edema in lower extremities   Skin unremarkable.               Data:     Results for orders placed or performed during the hospital encounter of 20 (from the past 24 hour(s))   CBC with Platelets & Differential   Result Value Ref Range    WBC 8.0 4.0 - 11.0 10e9/L    RBC Count 3.18 (L) 3.8 - 5.2 10e12/L    Hemoglobin 10.4 (L) 11.7 - 15.7 g/dL    Hematocrit 31.2 (L) 35.0 - 47.0 %    MCV 98 78 - 100 fl    MCH 32.7 26.5 - 33.0 pg    MCHC 33.3 31.5 - 36.5 g/dL    RDW 12.1 10.0 - 15.0 %    Platelet Count 94 (L) 150 - 450 10e9/L    Diff Method Automated Method     % Neutrophils 76.7 %    % Lymphocytes 8.8 %    % Monocytes 9.6 %    % Eosinophils 3.5 %    % Basophils 0.1 %    % Immature Granulocytes 1.3 %    Nucleated RBCs 0 0 /100    Absolute Neutrophil 6.1 1.6 - 8.3 10e9/L    Absolute Lymphocytes 0.7 (L) 0.8 - 5.3 10e9/L    Absolute Monocytes 0.8 0.0 - 1.3 10e9/L    Absolute Eosinophils 0.3 0.0 - 0.7 10e9/L    Absolute Basophils 0.0 0.0 - 0.2 10e9/L    Abs Immature Granulocytes 0.1 0 - 0.4 10e9/L    Absolute Nucleated RBC 0.0    Comprehensive metabolic panel   Result Value Ref Range    Sodium 137 133 - 144 mmol/L    Potassium 3.7 3.4 - 5.3 mmol/L    Chloride 103 94 - 109 mmol/L    Carbon Dioxide 29 20 - 32 mmol/L    Anion Gap 5 3 - 14 mmol/L    Glucose 98 70 - 99 mg/dL    Urea Nitrogen 14 7 - 30 mg/dL    Creatinine 0.93 0.52 - 1.04 mg/dL    GFR  Estimate 67 >60 mL/min/[1.73_m2]    GFR Estimate If Black 77 >60 mL/min/[1.73_m2]    Calcium 8.1 (L) 8.5 - 10.1 mg/dL    Bilirubin Total 1.1 0.2 - 1.3 mg/dL    Albumin 2.4 (L) 3.4 - 5.0 g/dL    Protein Total 6.0 (L) 6.8 - 8.8 g/dL    Alkaline Phosphatase 115 40 - 150 U/L    ALT 58 (H) 0 - 50 U/L    AST 67 (H) 0 - 45 U/L   CRP inflammation   Result Value Ref Range    CRP Inflammation 165.0 (H) 0.0 - 8.0 mg/L   D dimer quantitative   Result Value Ref Range    D Dimer 2.7 (H) 0.0 - 0.50 ug/ml FEU   Fibrinogen activity   Result Value Ref Range    Fibrinogen 663 (H) 200 - 420 mg/dL   INR   Result Value Ref Range    INR 1.32 (H) 0.86 - 1.14   Lactate Dehydrogenase   Result Value Ref Range    Lactate Dehydrogenase 161 81 - 234 U/L   Reticulocyte count   Result Value Ref Range    % Retic 1.6 0.5 - 2.0 %    Absolute Retic 49.6 25 - 95 10e9/L           Attestation:  I have reviewed today's vital signs, notes, medications, labs and imaging.  Amount of time spent in direct patient care: 30 minutes.     Germain Mckenna MD, MD

## 2020-12-08 NOTE — PLAN OF CARE
"PUI. COVID (+). A&O. Up independently in room. Clear liquid diet tolerated well. No reports of pain or N&V. PRN IV Dilaudid 0.5 mg x 1 today for abdominal pain control. IV saline locked. Pt on Flagyl, Rocephin, and Cyclosporine. Formed/soft stool x 1 today. /83   Pulse 75   Temp 97.6  F (36.4  C) (Oral)   Resp 18   Ht 1.575 m (5' 2.01\")   Wt 91.9 kg (202 lb 9.6 oz)   SpO2 99%   BMI 37.05 kg/m  .       Pamela Lamar RN on 12/8/2020 at 3:22 PM    "

## 2020-12-08 NOTE — PROGRESS NOTES
"CLINICAL NUTRITION SERVICES - ASSESSMENT NOTE     Nutrition Prescription    RECOMMENDATIONS FOR MDs/PROVIDERS TO ORDER:  ADAT per surgeon discretion.    Malnutrition Status:    Unable to determine due to no NFPA.    Recommendations already ordered by Registered Dietitian (RD):  None at this time    Future/Additional Recommendations:  -continue to monitor and encourage oral intake  -consider oral nutrition supplements if oral intake declines     REASON FOR ASSESSMENT  Nicci Pemberton is a/an 60 year old female assessed by the dietitian for MST score of 2: positive  for wt loss of 2-13 lbs and positive  for poor PO intake R/t decreased appetite    Patient presented with lower abdominal pain, low-grade subjective fever and 3 episodes of watery diarrhea. Principal problem is diverticulitis of colon with abscess. Per surgeon consult note 12/6: continue with conservative management. PMH is significant for hx of liver transplant secondary to ANGULO 8/18/19, lymphedema, hypothyroidism.     + COVID-19 on 12/6/2020.    NUTRITION HISTORY  Obtained from patient via telephone:  -allergy to fruits with cores and pits (such as fresh apples, but can do frozen or canned fruit)  -knee surgery 5 weeks ago on heavy meds for pain and doing stool softener with it. Had diarrhea for a little over a week due to stool softeners.  -started feeling abdominal pain on 12/3 - felt like \"gas trapped.\" Didn't eat anything for the rest of the day 12/3. Ate a waffle Friday morning 12/4, had a very watery bowel movement with food pieces about half an hour later. That was the last BM until this morning.  -normal intake is small meals throughout the day. Wants to start getting in a better breakfast and a small lunch and dinner each day.     CURRENT NUTRITION ORDERS  Diet: Orders Placed This Encounter      Clear Liquid Diet    Intake/Tolerance: Nothing on Saturday 12/5, advanced to clears on 12/7 around 1130. Tolerating clears well, drinking nearly 100% per " "patient report. Pain in abdomen is subsiding.  Bowel sounds are normoactive in all quadrants, and abdominal discomfort noted.     LABS  Na 137 (WNL)  K+ 3.7 (WNL)  Bilirubin total 1.1 (WNL)  Alk Phos 115 (WNL)  ALT 58 (H) - trending up  AST 67 (H) - trending up  CRP inflammation 165.0 (H)    12/7:  Bilirubin direct 0.5 (H)    MEDICATIONS  Cyclosporine (generic equivalent)  pepcid  Levothyroxine  Flagyl      ANTHROPOMETRICS  Height: 157.5 cm (5' 2.008\")  Most Recent Weight: 91.9 kg (202 lb 9.6 oz)    IBW: 50 kg (184% IBW)  BMI: Obesity Grade II BMI 35-39.9  Weight History:   Wt Readings from Last 10 Encounters:   12/08/20 91.9 kg (202 lb 9.6 oz)   10/28/20 96.6 kg (213 lb)   10/28/20 96.6 kg (213 lb)   10/23/20 93 kg (205 lb 0.4 oz)   08/27/20 90.7 kg (200 lb)   05/13/20 84 kg (185 lb 3 oz)   03/04/20 78.2 kg (172 lb 8 oz)   02/10/20 78.3 kg (172 lb 9.9 oz)   01/23/20 81 kg (178 lb 8 oz)   01/08/20 78.5 kg (173 lb 1.6 oz)   -patient denies any recent unintentional weight loss, but is watching her weight. Reports UBW is 170-200 lbs.     Vitals:    12/05/20 1740 12/05/20 2259 12/08/20 0518   Weight: 89.8 kg (198 lb) 92.5 kg (203 lb 14.8 oz) 91.9 kg (202 lb 9.6 oz)   -daily weights ordered    Dosing Weight: 60 kg (adjusted based on lowest weight this admission 90 kg and IBW of 50 kg)    ASSESSED NUTRITION NEEDS  Estimated Energy Needs: 8424-1449 kcals/day (25 - 30 kcals/kg)  Justification: Maintenance  Estimated Protein Needs: 60-72 grams protein/day (1 - 1.2 grams of pro/kg)  Justification: Maintenance  Estimated Fluid Needs: (1 mL/kcal)   Justification: Maintenance and Per provider pending fluid status    PHYSICAL FINDINGS  Unable to assess due to isolation precautions secondary to COVID-19 infection. RDs not entering rooms to preserve PPE and minimize exposure, per nutrition department guidelines.  Edema: 1+ (trace) bilateral legs and ankles    MALNUTRITION  % Intake: Decreased intake does not meet criteria - poor " "oral intake day 4 today.  % Weight Loss: None noted  Subcutaneous Fat Loss: Unable to assess  Muscle Loss: Unable to assess  Fluid Accumulation/Edema: Does not meet criteria (trace)  Malnutrition Diagnosis: Unable to determine due to no NFPA.    NUTRITION DIAGNOSIS  Inadequate oral intake related to poor appetite and limited ability to consume adequate nutrition as evidenced by NPO/clear liquid diet order x 3 days.    INTERVENTIONS  Implementation  Nutrition Education: Provided education on the increasing fiber via the following handouts: Constipation Meal Planning Tips (to help slowly increase fiber in the diet), Fill in the Fiber Gaps: Practical Ways to Add Fiber to Your Day, Getting More Fiber Cooking Tips, Fiber Content of Foods, Nutrition and Hydration: Quick Facts for COVID-19 Patients, and Healthy Snacks. Patient very appreciative of the education and is looking forward to the \"reading material.\"  Collaboration with other providers: rounds  Consider oral nutrition supplements if oral intake declines.    Goals  ADAT     Monitoring/Evaluation  Progress toward goals will be monitored and evaluated per protocol.    Paty Oneal RDN, LD  Clinical Dietitian  Office: [phone]  Weekend Pager: [phone]    "

## 2020-12-09 ENCOUNTER — TELEPHONE (OUTPATIENT)
Dept: TRANSPLANT | Facility: CLINIC | Age: 60
End: 2020-12-09

## 2020-12-09 LAB
ALBUMIN SERPL-MCNC: 2.4 G/DL (ref 3.4–5)
ALP SERPL-CCNC: 109 U/L (ref 40–150)
ALT SERPL W P-5'-P-CCNC: 54 U/L (ref 0–50)
ANION GAP SERPL CALCULATED.3IONS-SCNC: 6 MMOL/L (ref 3–14)
AST SERPL W P-5'-P-CCNC: 58 U/L (ref 0–45)
BASOPHILS # BLD AUTO: 0 10E9/L (ref 0–0.2)
BASOPHILS NFR BLD AUTO: 0 %
BILIRUB SERPL-MCNC: 0.7 MG/DL (ref 0.2–1.3)
BUN SERPL-MCNC: 10 MG/DL (ref 7–30)
CALCIUM SERPL-MCNC: 8.2 MG/DL (ref 8.5–10.1)
CHLORIDE SERPL-SCNC: 103 MMOL/L (ref 94–109)
CO2 SERPL-SCNC: 30 MMOL/L (ref 20–32)
CREAT SERPL-MCNC: 0.92 MG/DL (ref 0.52–1.04)
CRP SERPL-MCNC: 87 MG/L (ref 0–8)
D DIMER PPP FEU-MCNC: 2.7 UG/ML FEU (ref 0–0.5)
DIFFERENTIAL METHOD BLD: ABNORMAL
EOSINOPHIL # BLD AUTO: 0.2 10E9/L (ref 0–0.7)
EOSINOPHIL NFR BLD AUTO: 3.1 %
ERYTHROCYTE [DISTWIDTH] IN BLOOD BY AUTOMATED COUNT: 12 % (ref 10–15)
FIBRINOGEN PPP-MCNC: 682 MG/DL (ref 200–420)
GFR SERPL CREATININE-BSD FRML MDRD: 68 ML/MIN/{1.73_M2}
GLUCOSE SERPL-MCNC: 89 MG/DL (ref 70–99)
HCT VFR BLD AUTO: 32.6 % (ref 35–47)
HGB BLD-MCNC: 10.6 G/DL (ref 11.7–15.7)
IMM GRANULOCYTES # BLD: 0.1 10E9/L (ref 0–0.4)
IMM GRANULOCYTES NFR BLD: 1.4 %
INR PPP: 1.21 (ref 0.86–1.14)
LDH SERPL L TO P-CCNC: 152 U/L (ref 81–234)
LYMPHOCYTES # BLD AUTO: 1 10E9/L (ref 0.8–5.3)
LYMPHOCYTES NFR BLD AUTO: 16.9 %
MCH RBC QN AUTO: 32.2 PG (ref 26.5–33)
MCHC RBC AUTO-ENTMCNC: 32.5 G/DL (ref 31.5–36.5)
MCV RBC AUTO: 99 FL (ref 78–100)
MONOCYTES # BLD AUTO: 0.7 10E9/L (ref 0–1.3)
MONOCYTES NFR BLD AUTO: 11.8 %
NEUTROPHILS # BLD AUTO: 3.9 10E9/L (ref 1.6–8.3)
NEUTROPHILS NFR BLD AUTO: 66.8 %
NRBC # BLD AUTO: 0 10*3/UL
NRBC BLD AUTO-RTO: 0 /100
PLATELET # BLD AUTO: 106 10E9/L (ref 150–450)
POTASSIUM SERPL-SCNC: 3.5 MMOL/L (ref 3.4–5.3)
PROT SERPL-MCNC: 6 G/DL (ref 6.8–8.8)
RBC # BLD AUTO: 3.29 10E12/L (ref 3.8–5.2)
RETICS # AUTO: 40.8 10E9/L (ref 25–95)
RETICS/RBC NFR AUTO: 1.2 % (ref 0.5–2)
SODIUM SERPL-SCNC: 139 MMOL/L (ref 133–144)
TROPONIN I SERPL-MCNC: <0.015 UG/L (ref 0–0.04)
WBC # BLD AUTO: 5.9 10E9/L (ref 4–11)

## 2020-12-09 PROCEDURE — 85379 FIBRIN DEGRADATION QUANT: CPT | Performed by: FAMILY MEDICINE

## 2020-12-09 PROCEDURE — 85610 PROTHROMBIN TIME: CPT | Performed by: FAMILY MEDICINE

## 2020-12-09 PROCEDURE — 250N000012 HC RX MED GY IP 250 OP 636 PS 637: Performed by: FAMILY MEDICINE

## 2020-12-09 PROCEDURE — 250N000013 HC RX MED GY IP 250 OP 250 PS 637: Performed by: FAMILY MEDICINE

## 2020-12-09 PROCEDURE — 85384 FIBRINOGEN ACTIVITY: CPT | Performed by: FAMILY MEDICINE

## 2020-12-09 PROCEDURE — 36415 COLL VENOUS BLD VENIPUNCTURE: CPT | Performed by: FAMILY MEDICINE

## 2020-12-09 PROCEDURE — 86140 C-REACTIVE PROTEIN: CPT | Performed by: FAMILY MEDICINE

## 2020-12-09 PROCEDURE — 250N000011 HC RX IP 250 OP 636: Performed by: EMERGENCY MEDICINE

## 2020-12-09 PROCEDURE — 83615 LACTATE (LD) (LDH) ENZYME: CPT | Performed by: FAMILY MEDICINE

## 2020-12-09 PROCEDURE — 84484 ASSAY OF TROPONIN QUANT: CPT | Performed by: FAMILY MEDICINE

## 2020-12-09 PROCEDURE — 85045 AUTOMATED RETICULOCYTE COUNT: CPT | Performed by: FAMILY MEDICINE

## 2020-12-09 PROCEDURE — 250N000011 HC RX IP 250 OP 636: Performed by: FAMILY MEDICINE

## 2020-12-09 PROCEDURE — 99232 SBSQ HOSP IP/OBS MODERATE 35: CPT | Performed by: FAMILY MEDICINE

## 2020-12-09 PROCEDURE — 120N000001 HC R&B MED SURG/OB

## 2020-12-09 PROCEDURE — 85025 COMPLETE CBC W/AUTO DIFF WBC: CPT | Performed by: FAMILY MEDICINE

## 2020-12-09 PROCEDURE — 80053 COMPREHEN METABOLIC PANEL: CPT | Performed by: FAMILY MEDICINE

## 2020-12-09 RX ADMIN — FAMOTIDINE 20 MG: 20 TABLET ORAL at 10:07

## 2020-12-09 RX ADMIN — Medication 1000 UNITS: at 21:34

## 2020-12-09 RX ADMIN — CYCLOSPORINE 75 MG: 25 CAPSULE, LIQUID FILLED ORAL at 10:06

## 2020-12-09 RX ADMIN — FAMOTIDINE 20 MG: 20 TABLET ORAL at 17:12

## 2020-12-09 RX ADMIN — HYDROMORPHONE HYDROCHLORIDE 0.5 MG: 1 INJECTION, SOLUTION INTRAMUSCULAR; INTRAVENOUS; SUBCUTANEOUS at 15:45

## 2020-12-09 RX ADMIN — ENOXAPARIN SODIUM 40 MG: 40 INJECTION SUBCUTANEOUS at 17:12

## 2020-12-09 RX ADMIN — Medication 1000 UNITS: at 10:06

## 2020-12-09 RX ADMIN — METRONIDAZOLE 500 MG: 500 INJECTION, SOLUTION INTRAVENOUS at 15:39

## 2020-12-09 RX ADMIN — HYDROMORPHONE HYDROCHLORIDE 0.5 MG: 1 INJECTION, SOLUTION INTRAMUSCULAR; INTRAVENOUS; SUBCUTANEOUS at 02:36

## 2020-12-09 RX ADMIN — LEVOTHYROXINE SODIUM 125 MCG: 125 TABLET ORAL at 10:07

## 2020-12-09 RX ADMIN — CEFTRIAXONE SODIUM 1 G: 1 INJECTION, SOLUTION INTRAVENOUS at 10:07

## 2020-12-09 RX ADMIN — CYCLOSPORINE 75 MG: 25 CAPSULE, LIQUID FILLED ORAL at 21:35

## 2020-12-09 RX ADMIN — METRONIDAZOLE 500 MG: 500 INJECTION, SOLUTION INTRAVENOUS at 06:13

## 2020-12-09 RX ADMIN — HYDROMORPHONE HYDROCHLORIDE 0.5 MG: 1 INJECTION, SOLUTION INTRAMUSCULAR; INTRAVENOUS; SUBCUTANEOUS at 21:34

## 2020-12-09 ASSESSMENT — ACTIVITIES OF DAILY LIVING (ADL)
ADLS_ACUITY_SCORE: 14

## 2020-12-09 NOTE — PLAN OF CARE
"A&O. Up independently in room with cane. Showered independently this afternoon. IV saline locked between abx doses. Full liquid diet tolerated well. No reports of N&V. Mild reports of pain, but no pain medication requested. Small soft/formed BM today. States that she is overall feeling better. Likely to discharge tomorrow. /83   Pulse 71   Temp 97.5  F (36.4  C) (Oral)   Resp 18   Ht 1.575 m (5' 2.01\")   Wt 91.9 kg (202 lb 9.6 oz)   SpO2 97%   BMI 37.05 kg/m  .       Pamela Lamar RN on 12/9/2020 at 2:45 PM    "

## 2020-12-09 NOTE — PROGRESS NOTES
[facility] Hospitalist Progress Note           Assessment & Plan        Nicci Pemberton is a 60 year old female who presents on 12/5/2020 with lower abdominal pain, low-grade subjective fever and 3 episodes of watery diarrhea       Diverticulitis of colon with abscess  12/5/20 -- Patient presents with lower abdominal pain and low-grade subjective fever at 99.9 associated with 3 episodes of watery diarrhea.    CT scan abdomen pelvis showed Urinary bladder wall thickening with soft tissue stranding around the bladder suspicious for cystitis. Small 2.0 x 1.5 cm fluid collection between the bladder and the uterus with early forming wall suspicious for possible abscess. There are multiple colonic diverticula present with the pelvic soft tissue stranding abutting the sigmoid colon. It is possible there has been locally perforated sigmoid diverticulitis as a cause of the likely small abscess in the pelvis.   Patient was started on ceftriaxone and Flagyl  n.p.o. with exception for ice chips and meds  12/6/2020 -- overall clinically unchanged, WBC up but fever improved.  Continue rocephin and flagyl for now.     12/7/2020 -- Discussed with radiology - not able to tap/drain the abscess given size/location.  no diarrheal stool to send for C.Diff, does not look like that is a concern here - order cancelled.  Afebrile since admission. CRP up, but unclear if that's due to her abscess or COVID-19 but overall looks like she's clinically improving.    12/9/2020 -- CRP and clinical appearance improving.  Continue antibiotics.  Advance diet as tolerated.  Possible discharge in 1-2 days on oral antibiotics if continuing to show clinical improvement.  Surgery following in background, will see her again if requested.       Acute COVID-19 infection   12/7/2020 -- COVID screen positive, unclear if this was contributing at all to her symptoms on presentation, but seems like the fever and abdominal pain were much more likely due to her  diverticulitis/abscess than due to the COVID-19.  Inflammatory markers up, but not needing oxygen.    12/8/2020 -- no change.  Hold off on dexamethasone/remdesivir for now.     Will need longer isolation given immune suppressed state.         Status post liver transplantation (H)    Immunosuppressed status (H)  12/5/20 -- History of liver transplant secondary to ANGULO.  On cyclosporine 75 mg twice daily  Transplant hematology was contacted by the ED provider and the okay with admission to our hospital.  They also okay with the current plan of care  -Resume home medication cyclosporine 75 mg twice daily  12/7/2020 -- Cyclosporin level normal.  Discussed with liver transplant team from [facility].  They are happy with cyclospiron dose/level for now, recommend recheck of cyclosporin level on Thurs or Fri if patient still inpatient.   Follow daily liver labs - they are happy with bili where it is with normal liver enzymes.  Said no changes or reason for transfer at present, they will follow labs and remain available.    12/9/2020 -- bili normal, liver enzymes just faintly up - transplant following - rechecking cyclosporin level in AM.         Hypothyroidism   Continue home Synthroid       Lymphedema  12/5/20 Patient has lymphedema wrap in place  12/8/2020 -- appears baseline.  weight stable.       Thrombocytopenia (H)  12/5/20 -- Chronic problem.  Platelet on admission 62.  No current issues with bleeding.   12/9/2020 -- stable.   Has appointment with Dr. Mckeon scheduled for 12/10/20.       Elevated bilirubin, s/p hepatic transplant  12/6/2020 -- nothing on CT scan, looks like bili has typically been 1.5-2, improved today.  Follow.  12/9/2020 -- AST/ALT just faintly up, but bili normalized.  Follow.    Discuss with transplant team if worsens as above.       Diet:  regular      DVT Prophylaxis: starting lovenox with platelets improving and now nearly 100 in patient with poor mobility and known COVID-19, but will need to  "watch closely- likely plan to stop lovenox if platelets drop at all.       Forrester Catheter: not present     Code Status:   Full code      Lines: IV line                     Disposition  Hope for dscharge perhaps tomorrow if much better, more likely Fri.              Interval History:   Continuing to slowly improve.  No new concerns, no dyspnea  No fever. Abdominal pain feels \"definitely better\" today.    No other pain             Review of Systems:    ROS: 10 point ROS neg other than the symptoms noted above in the HPI.           Medications:   Current active medications and PTA medications reviewed, see medication list for details.            Physical Exam:   Vitals were reviewed  Patient Vitals for the past 24 hrs:   BP Temp Temp src Pulse Resp SpO2   20 1534 117/80 98.1  F (36.7  C) Oral 77 18 100 %   20 0811 119/83 97.5  F (36.4  C) Oral 71 18 97 %   20 2246 122/77 98.3  F (36.8  C) Oral 71 18 94 %   20 1949 131/75 97.9  F (36.6  C) Oral 80 18 96 %       Temperatures:  Current - Temp: 98.1  F (36.7  C); Max - Temp  Av  F (36.7  C)  Min: 97.5  F (36.4  C)  Max: 98.3  F (36.8  C)  Respiration range: Resp  Av  Min: 18  Max: 18  Pulse range: Pulse  Av.8  Min: 71  Max: 80  Blood pressure range: Systolic (24hrs), Av , Min:117 , Max:131   ; Diastolic (24hrs), Av, Min:75, Max:83    Pulse oximetry range: SpO2  Av.8 %  Min: 94 %  Max: 100 %  I/O last 3 completed shifts:  In: 303 [P.O.:300; I.V.:3]  Out: -     Intake/Output Summary (Last 24 hours) at 2020 0998  Last data filed at 2020 1038  Gross per 24 hour   Intake 303 ml   Output --   Net 303 ml     EXAM:  General: awake and alert, NAD, oriented x 3  Head: normocephalic  Neck: unremarkable, no lymphadenopathy   HEENT: oropharynx pink and moist    Heart: Regular rate and rhythm, no murmurs, rubs, or gallops  Lungs: clear to auscultation bilaterally with good air movement throughout  Abdomen: soft, much less " tender left lower quadrant, no rebound or guarding, normal bowel sounds, non-distended, no masses or organomegaly  Extremities: no edema in lower extremities   Skin unremarkable.               Data:     Results for orders placed or performed during the hospital encounter of 12/05/20 (from the past 24 hour(s))   CBC with Platelets & Differential   Result Value Ref Range    WBC 5.9 4.0 - 11.0 10e9/L    RBC Count 3.29 (L) 3.8 - 5.2 10e12/L    Hemoglobin 10.6 (L) 11.7 - 15.7 g/dL    Hematocrit 32.6 (L) 35.0 - 47.0 %    MCV 99 78 - 100 fl    MCH 32.2 26.5 - 33.0 pg    MCHC 32.5 31.5 - 36.5 g/dL    RDW 12.0 10.0 - 15.0 %    Platelet Count 106 (L) 150 - 450 10e9/L    Diff Method Automated Method     % Neutrophils 66.8 %    % Lymphocytes 16.9 %    % Monocytes 11.8 %    % Eosinophils 3.1 %    % Basophils 0.0 %    % Immature Granulocytes 1.4 %    Nucleated RBCs 0 0 /100    Absolute Neutrophil 3.9 1.6 - 8.3 10e9/L    Absolute Lymphocytes 1.0 0.8 - 5.3 10e9/L    Absolute Monocytes 0.7 0.0 - 1.3 10e9/L    Absolute Eosinophils 0.2 0.0 - 0.7 10e9/L    Absolute Basophils 0.0 0.0 - 0.2 10e9/L    Abs Immature Granulocytes 0.1 0 - 0.4 10e9/L    Absolute Nucleated RBC 0.0    Comprehensive metabolic panel   Result Value Ref Range    Sodium 139 133 - 144 mmol/L    Potassium 3.5 3.4 - 5.3 mmol/L    Chloride 103 94 - 109 mmol/L    Carbon Dioxide 30 20 - 32 mmol/L    Anion Gap 6 3 - 14 mmol/L    Glucose 89 70 - 99 mg/dL    Urea Nitrogen 10 7 - 30 mg/dL    Creatinine 0.92 0.52 - 1.04 mg/dL    GFR Estimate 68 >60 mL/min/[1.73_m2]    GFR Estimate If Black 79 >60 mL/min/[1.73_m2]    Calcium 8.2 (L) 8.5 - 10.1 mg/dL    Bilirubin Total 0.7 0.2 - 1.3 mg/dL    Albumin 2.4 (L) 3.4 - 5.0 g/dL    Protein Total 6.0 (L) 6.8 - 8.8 g/dL    Alkaline Phosphatase 109 40 - 150 U/L    ALT 54 (H) 0 - 50 U/L    AST 58 (H) 0 - 45 U/L   CRP inflammation   Result Value Ref Range    CRP Inflammation 87.0 (H) 0.0 - 8.0 mg/L   D dimer quantitative   Result Value Ref  Range    D Dimer 2.7 (H) 0.0 - 0.50 ug/ml FEU   Fibrinogen activity   Result Value Ref Range    Fibrinogen 682 (H) 200 - 420 mg/dL   INR   Result Value Ref Range    INR 1.21 (H) 0.86 - 1.14   Lactate Dehydrogenase   Result Value Ref Range    Lactate Dehydrogenase 152 81 - 234 U/L   Reticulocyte count   Result Value Ref Range    % Retic 1.2 0.5 - 2.0 %    Absolute Retic 40.8 25 - 95 10e9/L   Troponin I   Result Value Ref Range    Troponin I ES <0.015 0.000 - 0.045 ug/L           Attestation:  I have reviewed today's vital signs, notes, medications, labs and imaging.  Amount of time spent in direct patient care: 30 minutes.     Germain Mckenna MD, MD

## 2020-12-09 NOTE — PROGRESS NOTES
Nicci Pemberton   1960     Nursing Assessment:  Pt a/o x4, able to make needs known. Pt c/o pain to low abd, iv dilaudid administered per order and noted to offer relief. No other prn options at this time, adv pt PO prns would need to be available prior to discharge, pt verbalized understanding. No acute changes noted this shift, will continue to monitor.       Vitals:  B/P: 122/77,   T: 98.3,   P: 71,   R: 18       Sho Posada RN

## 2020-12-09 NOTE — PROGRESS NOTES
"A&O. Up independently in room with cane. IV saline locked between abx doses. Full liquid diet tolerated well. No reports of N&V. PRN IV Dilaudid 0.5 mg x 2 for abdominal and knee pain control. Likely to discharge tomorrow. /88   Pulse 62   Temp 99  F (37.2  C) (Oral)   Resp 18   Ht 1.575 m (5' 2.01\")   Wt 91.9 kg (202 lb 9.6 oz)   SpO2 99%   BMI 37.05 kg/m  .       Pamela Lamar RN on 12/9/2020 at 11:26 PM    "

## 2020-12-09 NOTE — TELEPHONE ENCOUNTER
Spoke with patient. She is feeling a little better each day. Afebrile. Pt's , Hugo, tested negative for COVID.

## 2020-12-10 ENCOUNTER — TELEPHONE (OUTPATIENT)
Dept: ORTHOPEDICS | Facility: CLINIC | Age: 60
End: 2020-12-10

## 2020-12-10 LAB
ALBUMIN SERPL-MCNC: 2.8 G/DL (ref 3.4–5)
ALP SERPL-CCNC: 112 U/L (ref 40–150)
ALT SERPL W P-5'-P-CCNC: 50 U/L (ref 0–50)
ANION GAP SERPL CALCULATED.3IONS-SCNC: 2 MMOL/L (ref 3–14)
AST SERPL W P-5'-P-CCNC: 51 U/L (ref 0–45)
BASOPHILS # BLD AUTO: 0 10E9/L (ref 0–0.2)
BASOPHILS NFR BLD AUTO: 0.2 %
BILIRUB SERPL-MCNC: 0.6 MG/DL (ref 0.2–1.3)
BUN SERPL-MCNC: 8 MG/DL (ref 7–30)
CALCIUM SERPL-MCNC: 8.5 MG/DL (ref 8.5–10.1)
CHLORIDE SERPL-SCNC: 103 MMOL/L (ref 94–109)
CO2 SERPL-SCNC: 34 MMOL/L (ref 20–32)
CREAT SERPL-MCNC: 0.9 MG/DL (ref 0.52–1.04)
CRP SERPL-MCNC: 51.6 MG/L (ref 0–8)
CYCLOSPORINE BLD LC/MS/MS-MCNC: 134 UG/L (ref 50–400)
D DIMER PPP FEU-MCNC: 3.1 UG/ML FEU (ref 0–0.5)
DIFFERENTIAL METHOD BLD: ABNORMAL
EOSINOPHIL # BLD AUTO: 0.1 10E9/L (ref 0–0.7)
EOSINOPHIL NFR BLD AUTO: 2.4 %
ERYTHROCYTE [DISTWIDTH] IN BLOOD BY AUTOMATED COUNT: 12.2 % (ref 10–15)
FIBRINOGEN PPP-MCNC: 682 MG/DL (ref 200–420)
GFR SERPL CREATININE-BSD FRML MDRD: 69 ML/MIN/{1.73_M2}
GLUCOSE SERPL-MCNC: 92 MG/DL (ref 70–99)
HCT VFR BLD AUTO: 34.4 % (ref 35–47)
HGB BLD-MCNC: 11.4 G/DL (ref 11.7–15.7)
IMM GRANULOCYTES # BLD: 0.1 10E9/L (ref 0–0.4)
IMM GRANULOCYTES NFR BLD: 1.7 %
INR PPP: 1.16 (ref 0.86–1.14)
LDH SERPL L TO P-CCNC: 171 U/L (ref 81–234)
LYMPHOCYTES # BLD AUTO: 0.8 10E9/L (ref 0.8–5.3)
LYMPHOCYTES NFR BLD AUTO: 14.5 %
MCH RBC QN AUTO: 32.8 PG (ref 26.5–33)
MCHC RBC AUTO-ENTMCNC: 33.1 G/DL (ref 31.5–36.5)
MCV RBC AUTO: 99 FL (ref 78–100)
MONOCYTES # BLD AUTO: 0.6 10E9/L (ref 0–1.3)
MONOCYTES NFR BLD AUTO: 10.5 %
NEUTROPHILS # BLD AUTO: 4.1 10E9/L (ref 1.6–8.3)
NEUTROPHILS NFR BLD AUTO: 70.7 %
NRBC # BLD AUTO: 0 10*3/UL
NRBC BLD AUTO-RTO: 0 /100
PLATELET # BLD AUTO: 117 10E9/L (ref 150–450)
PLATELET # BLD EST: ABNORMAL 10*3/UL
POTASSIUM SERPL-SCNC: 3.7 MMOL/L (ref 3.4–5.3)
PROT SERPL-MCNC: 6.6 G/DL (ref 6.8–8.8)
RBC # BLD AUTO: 3.48 10E12/L (ref 3.8–5.2)
RBC MORPH BLD: NORMAL
RETICS # AUTO: 27.1 10E9/L (ref 25–95)
RETICS/RBC NFR AUTO: 0.8 % (ref 0.5–2)
SODIUM SERPL-SCNC: 139 MMOL/L (ref 133–144)
TME LAST DOSE: NORMAL H
WBC # BLD AUTO: 5.7 10E9/L (ref 4–11)

## 2020-12-10 PROCEDURE — 86140 C-REACTIVE PROTEIN: CPT | Performed by: FAMILY MEDICINE

## 2020-12-10 PROCEDURE — 250N000011 HC RX IP 250 OP 636: Performed by: FAMILY MEDICINE

## 2020-12-10 PROCEDURE — 85384 FIBRINOGEN ACTIVITY: CPT | Performed by: FAMILY MEDICINE

## 2020-12-10 PROCEDURE — 99232 SBSQ HOSP IP/OBS MODERATE 35: CPT | Performed by: INTERNAL MEDICINE

## 2020-12-10 PROCEDURE — 80158 DRUG ASSAY CYCLOSPORINE: CPT | Performed by: FAMILY MEDICINE

## 2020-12-10 PROCEDURE — 83615 LACTATE (LD) (LDH) ENZYME: CPT | Performed by: FAMILY MEDICINE

## 2020-12-10 PROCEDURE — 250N000013 HC RX MED GY IP 250 OP 250 PS 637: Performed by: INTERNAL MEDICINE

## 2020-12-10 PROCEDURE — 85610 PROTHROMBIN TIME: CPT | Performed by: FAMILY MEDICINE

## 2020-12-10 PROCEDURE — 85025 COMPLETE CBC W/AUTO DIFF WBC: CPT | Performed by: FAMILY MEDICINE

## 2020-12-10 PROCEDURE — 120N000001 HC R&B MED SURG/OB

## 2020-12-10 PROCEDURE — 85379 FIBRIN DEGRADATION QUANT: CPT | Performed by: FAMILY MEDICINE

## 2020-12-10 PROCEDURE — 250N000011 HC RX IP 250 OP 636: Performed by: INTERNAL MEDICINE

## 2020-12-10 PROCEDURE — 80053 COMPREHEN METABOLIC PANEL: CPT | Performed by: FAMILY MEDICINE

## 2020-12-10 PROCEDURE — 250N000012 HC RX MED GY IP 250 OP 636 PS 637: Performed by: FAMILY MEDICINE

## 2020-12-10 PROCEDURE — 250N000011 HC RX IP 250 OP 636: Performed by: EMERGENCY MEDICINE

## 2020-12-10 PROCEDURE — 36415 COLL VENOUS BLD VENIPUNCTURE: CPT | Performed by: FAMILY MEDICINE

## 2020-12-10 PROCEDURE — 250N000013 HC RX MED GY IP 250 OP 250 PS 637: Performed by: FAMILY MEDICINE

## 2020-12-10 PROCEDURE — 85045 AUTOMATED RETICULOCYTE COUNT: CPT | Performed by: FAMILY MEDICINE

## 2020-12-10 RX ORDER — OXYCODONE HYDROCHLORIDE 5 MG/1
5-10 TABLET ORAL EVERY 4 HOURS PRN
Status: DISCONTINUED | OUTPATIENT
Start: 2020-12-10 | End: 2020-12-11 | Stop reason: HOSPADM

## 2020-12-10 RX ADMIN — CYCLOSPORINE 75 MG: 25 CAPSULE, LIQUID FILLED ORAL at 10:11

## 2020-12-10 RX ADMIN — FAMOTIDINE 20 MG: 20 TABLET ORAL at 16:43

## 2020-12-10 RX ADMIN — METRONIDAZOLE 500 MG: 500 INJECTION, SOLUTION INTRAVENOUS at 23:43

## 2020-12-10 RX ADMIN — LEVOTHYROXINE SODIUM 125 MCG: 125 TABLET ORAL at 08:13

## 2020-12-10 RX ADMIN — HYDROMORPHONE HYDROCHLORIDE 0.5 MG: 1 INJECTION, SOLUTION INTRAMUSCULAR; INTRAVENOUS; SUBCUTANEOUS at 10:19

## 2020-12-10 RX ADMIN — OXYCODONE HYDROCHLORIDE 5 MG: 5 TABLET ORAL at 20:53

## 2020-12-10 RX ADMIN — Medication 1000 UNITS: at 20:53

## 2020-12-10 RX ADMIN — CEFTRIAXONE SODIUM 1 G: 1 INJECTION, SOLUTION INTRAVENOUS at 10:11

## 2020-12-10 RX ADMIN — OXYCODONE HYDROCHLORIDE 5 MG: 5 TABLET ORAL at 22:49

## 2020-12-10 RX ADMIN — ENOXAPARIN SODIUM 40 MG: 40 INJECTION SUBCUTANEOUS at 16:46

## 2020-12-10 RX ADMIN — METRONIDAZOLE 500 MG: 500 INJECTION, SOLUTION INTRAVENOUS at 16:46

## 2020-12-10 RX ADMIN — HYDROMORPHONE HYDROCHLORIDE 0.5 MG: 1 INJECTION, SOLUTION INTRAMUSCULAR; INTRAVENOUS; SUBCUTANEOUS at 01:30

## 2020-12-10 RX ADMIN — CYCLOSPORINE 75 MG: 25 CAPSULE, LIQUID FILLED ORAL at 22:39

## 2020-12-10 RX ADMIN — OXYCODONE HYDROCHLORIDE 5 MG: 5 TABLET ORAL at 16:43

## 2020-12-10 RX ADMIN — Medication 1000 UNITS: at 08:13

## 2020-12-10 RX ADMIN — METRONIDAZOLE 500 MG: 500 INJECTION, SOLUTION INTRAVENOUS at 00:21

## 2020-12-10 RX ADMIN — FAMOTIDINE 20 MG: 20 TABLET ORAL at 08:13

## 2020-12-10 RX ADMIN — METRONIDAZOLE 500 MG: 500 INJECTION, SOLUTION INTRAVENOUS at 06:59

## 2020-12-10 ASSESSMENT — ACTIVITIES OF DAILY LIVING (ADL)
ADLS_ACUITY_SCORE: 14
DEPENDENT_IADLS:: INDEPENDENT
ADLS_ACUITY_SCORE: 14
ADLS_ACUITY_SCORE: 14

## 2020-12-10 ASSESSMENT — MIFFLIN-ST. JEOR: SCORE: 1423.38

## 2020-12-10 NOTE — TELEPHONE ENCOUNTER
RN received phone call from patient from med surg due to diverticulitis and abscess. She is being followed by a GI doctor. Currently admitted in the hospital. Patient also unfortunately contracted Covid and was tested positive. She is asymptomatic and will go through the quarantine process. Patient will have no choice but need to cancel outpatient therapy but RN advised patient to reach out to PT to get home instructions and also use technology and websites such as [employer] to get information to continue to do physical therapy at home during quarantine. Patient expressed understanding and will keep RN posted of her on going health.    Colten Avila RN

## 2020-12-10 NOTE — PROGRESS NOTES
[facility] Medicine Progress Note  Date of Service: 12/10/2020    Assessment & Plan   Nicci Pemberton is a 60 year old female who presented on 12/5/2020 with lower abdominal pain, low-grade subjective fever and 3 episodes of watery diarrhea       Diverticulitis of colon with abscess    Patient presents with lower abdominal pain and low-grade subjective fever at 99.9 associated with 3 episodes of watery diarrhea.  CT scan abdomen pelvis showed Urinary bladder wall thickening with soft tissue stranding around the bladder suspicious for cystitis. Small 2.0 x 1.5 cm fluid collection between the bladder and the uterus with early forming wall suspicious for possible abscess. There are multiple colonic diverticula present with the pelvic soft tissue stranding abutting the sigmoid colon. It is possible there has been locally perforated sigmoid diverticulitis as a cause of the likely small abscess in the pelvis.  Patient was started on ceftriaxone and Flagyl    Discussed with radiology - not able to tap/drain the abscess given size/location.      CRP and clinical appearance improving.  Tolerating diet better.     - IV antibiotics today and start oral Augmentin tomorrow     Acute COVID-19 infection     COVID screen positive. No convincing symptoms of COVID.  Hold off on dexamethasone/remdesivir for now.      - Will need longer isolation given immune suppressed state.       Status post liver transplantation (H)  Immunosuppressed status (H)    History of liver transplant secondary to ANGULO.  On cyclosporine 75 mg twice daily  Transplant hematology was contacted by the ED provider and the okay with admission to our hospital.  They also okay with the current plan of care  -Resume home medication cyclosporine 75 mg twice daily    Cyclosporin level normal 12/6.  Discussed with liver transplant team from [facility].  They are happy with cyclospiron dose/level for now, recommend recheck of  cyclosporin level on Thurs or Fri if patient still inpatient.   Follow daily liver labs - they are happy with bili where it is with normal liver enzymes.  Said no changes or reason for transfer at present, they will follow labs and remain available.     - cyclosporine level pending       Hypothyroidism   Continue home Synthroid       Lymphedema  12/5/20 Patient has lymphedema wrap in place  12/8/2020 -- appears baseline.  weight stable.       Thrombocytopenia (H)  12/5/20 -- Chronic problem.  Platelet on admission 62.  No current issues with bleeding.   12/9/2020 -- stable.   Has appointment with Dr. Mckeon scheduled for 12/10/20.       Elevated bilirubin, s/p hepatic transplant  12/6/2020 -- nothing on CT scan, looks like bili has typically been 1.5-2, improved today.  Follow.  12/9/2020 -- AST/ALT just faintly up, but bili normalized.  Follow.    Discuss with transplant team if worsens as above.       Diet:  regular      DVT Prophylaxis: starting lovenox with platelets improving and now nearly 100 in patient with poor mobility and known COVID-19, but will need to watch closely- likely plan to stop lovenox if platelets drop at all.       Forrester Catheter: not present     Code Status:   Full code      Lines: IV line        Discussion: Improving diverticulitis and no symptoms of COVID    Disposition: Anticipate discharge likely tomorrow     Attestation:  Total time: 25 minutes    Binu Erickson MD  Hospital Medicine        Interval History   Abdominal cramping with BMs but improving. Tolerating more oral intake. No cough, or other respiratory symptoms    Physical Exam   Temp:  [97.8  F (36.6  C)-99  F (37.2  C)] 97.9  F (36.6  C)  Pulse:  [62-77] 67  Resp:  [18] 18  BP: (117-139)/(74-88) 130/74  SpO2:  [99 %-100 %] 99 %    Weights:   Vitals:    12/05/20 2259 12/08/20 0518 12/10/20 0702   Weight: 92.5 kg (203 lb 14.8 oz) 91.9 kg (202 lb 9.6 oz) 90 kg (198 lb 6.6 oz)    Body mass index is 36.28 kg/m .    Constitutional:  Alert and oriented, no acute distress, nontoxic  CV: Regular, no edema  Respiratory: CTA bilaterally  GI: Soft, mild low abdomen tenderness to moderate palpation, bowel sounds normal  Skin: Warm and dry    Data   Recent Labs   Lab 12/10/20  0844 12/09/20  0637 12/08/20 0511 12/07/20 0559 12/06/20  1426 12/05/20  1825 12/05/20  1825   WBC 5.7 5.9 8.0 12.9*  --    < > 10.3   HGB 11.4* 10.6* 10.4* 11.1*  --    < > 12.1   MCV 99 99 98 99  --    < > 98   * 106* 94* 72*  --    < > 62*   INR 1.16* 1.21* 1.32* 1.30* 1.22*   < >  --     139 137 137  --    < > 138   POTASSIUM 3.7 3.5 3.7 4.0  --    < > 3.6   CHLORIDE 103 103 103 102  --    < > 106   CO2 34* 30 29 30  --    < > 28   BUN 8 10 14 17  --    < > 36*   CR 0.90 0.92 0.93 0.99  --    < > 1.07*   ANIONGAP 2* 6 5 5  --    < > 4   JACOB 8.5 8.2* 8.1* 8.4*  --    < > 8.7   GLC 92 89 98 79  --    < > 85   ALBUMIN 2.8* 2.4* 2.4* 2.5*  --    < > 3.2*   PROTTOTAL 6.6* 6.0* 6.0* 6.2*  --    < > 7.2   BILITOTAL 0.6 0.7 1.1 1.9*  --    < > 2.0*   ALKPHOS 112 109 115 119  --    < > 140   ALT 50 54* 58* 42  --    < > 30   AST 51* 58* 67* 39  --    < > 15   LIPASE  --   --   --   --   --   --  61*   TROPI  --  <0.015  --  <0.015 <0.015  --   --     < > = values in this interval not displayed.       Recent Labs   Lab 12/10/20  0844 12/09/20  0637 12/08/20  0511 12/07/20  0559 12/06/20  0504 12/05/20  1825   GLC 92 89 98 79 92 85        Unresulted Labs Ordered in the Past 30 Days of this Admission     Date and Time Order Name Status Description    12/10/2020 0000 Cyclosporine In process            Imaging: No results found for this or any previous visit (from the past 24 hour(s)).     I reviewed all new labs and imaging results over the last 24 hours. I personally reviewed the abdominal CT image(s) showing Inflammatory changes posterior to the bladder near the sigmoid colon with tiny air bubbles noted consistent with perforated diverticulitis.    Medications     -  MEDICATION INSTRUCTIONS -         cefTRIAXone  1 g Intravenous Q24H     cycloSPORINE modified  75 mg Oral BID     enoxaparin ANTICOAGULANT  40 mg Subcutaneous Q24H     famotidine  20 mg Oral BID     levothyroxine  125 mcg Oral Daily     metroNIDAZOLE  500 mg Intravenous Q8H     sodium chloride (PF)  3 mL Intravenous Q8H     Vitamin D3  1,000 Units Oral BID   Reviewed    Binu Erickson MD  [facility] Medicine

## 2020-12-10 NOTE — CONSULTS
Care Management Initial Consult    General Information  Assessment completed with: Nicci Ovalle  Type of CM/SW Visit: Initial Assessment  Primary Care Provider verified and updated as needed: Yes   Readmission within the last 30 days: no previous admission in last 30 days      Reason for Consult: discharge planning  Advance Care Planning: Advance Care Planning Reviewed: no concerns identified          Communication Assessment  Patient's communication style: spoken language (English or Bilingual)    Hearing Difficulty or Deaf: no   Wear Glasses or Blind: no    Cognitive  Cognitive/Neuro/Behavioral: WDL                      Living Environment:   People in home: spouse  Hugo  Current living Arrangements: house      Able to return to prior arrangements: yes       Family/Social Support:  Care provided by: self  Provides care for: no one  Marital Status:     Hugo       Description of Support System: Supportive;Involved    Support Assessment: Adequate family and caregiver support    Current Resources:   Skilled Home Care Services:  none  Community Resources: (OP therapy)  Equipment currently used at home: cane, straight;walker, rolling  Supplies currently used at home: None    Employment/Financial:  Employment Status: retired        Financial Concerns: No concerns identified      Lifestyle & Psychosocial Needs:  Socioeconomic History     Marital status:      Spouse name: Not on file     Number of children: Not on file     Years of education: Not on file     Highest education level: Not on file     Tobacco Use     Smoking status: Former Smoker     Packs/day: 0.50     Years: 10.00     Pack years: 5.00     Types: Cigarettes     Start date: 2000     Quit date:      Years since quittin.1     Smokeless tobacco: Never Used   Substance and Sexual Activity     Alcohol use: No     Drug use: No     Sexual activity: Not Currently     Partners: Male     Birth control/protection: Post-menopausal        Functional Status:  Prior to admission patient needed assistance:   Dependent ADLs:: Ambulation-cane;Ambulation-walker  Dependent IADLs:: Independent  Assesssment of Functional Status: At functional baseline    Mental Health Status:  Mental Health Status: No Current Concerns       Chemical Dependency Status:  Chemical Dependency Status: No Current Concerns             Values/Beliefs:  Spiritual, Cultural Beliefs, Restorationism Practices, Values that affect care: no               Additional Information:  Spoke with patient via phone, introduced self and role.  Patient currently lives with her , Hugo in a home in [place].  She states her permanent home is in [place], but they have been staying at their cabin/temporary home in [place] since the COVID pandemic started in the spring as an attempt to prevent exposure due to patient's immunosuppression (liver transplant).  She is independent with ADLs at baseline.  She is currently recovering from TKA surgery on 10/23.  She has been attending outpatient physical therapy ([facility]) related to some ongoing stiffness and range of motion issues.  She states she uses a cane for some ambulation assist.  She uses a walker if there is any chance of inclement weather.  She feels she is recovering well, and is able to meet her needs at home.      Discussed discharge needs/resources.  Patient is concerned about returning to the outpatient therapy clinic due to her COVID diagnosis.  MD to discuss recommended quarantine.  Discussed likely need to hold outpatient therapy while on quarantine.  Discussed home care services.  Patient declined need, stating she has print out information at home regarding exercises.      Patient states that she will schedule follow up appointment with her transplant team and PCP upon discharge.     Patient's  will provide transportation home upon discharge.      Ivy CM RN  Inpatient Care Coordinator  EDMUNDO  [facility] [phone]  [facility] [phone]

## 2020-12-11 ENCOUNTER — TELEPHONE (OUTPATIENT)
Dept: TRANSPLANT | Facility: CLINIC | Age: 60
End: 2020-12-11

## 2020-12-11 VITALS
DIASTOLIC BLOOD PRESSURE: 79 MMHG | HEART RATE: 64 BPM | RESPIRATION RATE: 16 BRPM | TEMPERATURE: 96.8 F | WEIGHT: 199.08 LBS | OXYGEN SATURATION: 99 % | SYSTOLIC BLOOD PRESSURE: 122 MMHG | BODY MASS INDEX: 36.63 KG/M2 | HEIGHT: 62 IN

## 2020-12-11 DIAGNOSIS — K57.32 DIVERTICULITIS OF COLON: Primary | Chronic | ICD-10-CM

## 2020-12-11 LAB
ALBUMIN SERPL-MCNC: 2.5 G/DL (ref 3.4–5)
ALP SERPL-CCNC: 103 U/L (ref 40–150)
ALT SERPL W P-5'-P-CCNC: 39 U/L (ref 0–50)
ANION GAP SERPL CALCULATED.3IONS-SCNC: 3 MMOL/L (ref 3–14)
AST SERPL W P-5'-P-CCNC: 41 U/L (ref 0–45)
BILIRUB SERPL-MCNC: 0.4 MG/DL (ref 0.2–1.3)
BUN SERPL-MCNC: 13 MG/DL (ref 7–30)
CALCIUM SERPL-MCNC: 8.5 MG/DL (ref 8.5–10.1)
CHLORIDE SERPL-SCNC: 106 MMOL/L (ref 94–109)
CO2 SERPL-SCNC: 31 MMOL/L (ref 20–32)
CREAT SERPL-MCNC: 1.15 MG/DL (ref 0.52–1.04)
ERYTHROCYTE [DISTWIDTH] IN BLOOD BY AUTOMATED COUNT: 12 % (ref 10–15)
GFR SERPL CREATININE-BSD FRML MDRD: 51 ML/MIN/{1.73_M2}
GLUCOSE SERPL-MCNC: 100 MG/DL (ref 70–99)
HCT VFR BLD AUTO: 30.8 % (ref 35–47)
HGB BLD-MCNC: 10.1 G/DL (ref 11.7–15.7)
MCH RBC QN AUTO: 32.4 PG (ref 26.5–33)
MCHC RBC AUTO-ENTMCNC: 32.8 G/DL (ref 31.5–36.5)
MCV RBC AUTO: 99 FL (ref 78–100)
PLATELET # BLD AUTO: 100 10E9/L (ref 150–450)
POTASSIUM SERPL-SCNC: 4.5 MMOL/L (ref 3.4–5.3)
PROT SERPL-MCNC: 5.9 G/DL (ref 6.8–8.8)
RBC # BLD AUTO: 3.12 10E12/L (ref 3.8–5.2)
SODIUM SERPL-SCNC: 140 MMOL/L (ref 133–144)
WBC # BLD AUTO: 6.7 10E9/L (ref 4–11)

## 2020-12-11 PROCEDURE — 250N000013 HC RX MED GY IP 250 OP 250 PS 637: Performed by: FAMILY MEDICINE

## 2020-12-11 PROCEDURE — 36415 COLL VENOUS BLD VENIPUNCTURE: CPT | Performed by: FAMILY MEDICINE

## 2020-12-11 PROCEDURE — 99238 HOSP IP/OBS DSCHRG MGMT 30/<: CPT | Performed by: INTERNAL MEDICINE

## 2020-12-11 PROCEDURE — 85027 COMPLETE CBC AUTOMATED: CPT | Performed by: FAMILY MEDICINE

## 2020-12-11 PROCEDURE — 250N000013 HC RX MED GY IP 250 OP 250 PS 637: Performed by: INTERNAL MEDICINE

## 2020-12-11 PROCEDURE — 250N000012 HC RX MED GY IP 250 OP 636 PS 637: Performed by: FAMILY MEDICINE

## 2020-12-11 PROCEDURE — 80053 COMPREHEN METABOLIC PANEL: CPT | Performed by: FAMILY MEDICINE

## 2020-12-11 RX ADMIN — CYCLOSPORINE 75 MG: 25 CAPSULE, LIQUID FILLED ORAL at 10:15

## 2020-12-11 RX ADMIN — FAMOTIDINE 20 MG: 20 TABLET ORAL at 08:07

## 2020-12-11 RX ADMIN — AMOXICILLIN AND CLAVULANATE POTASSIUM 1 TABLET: 875; 125 TABLET, FILM COATED ORAL at 08:07

## 2020-12-11 RX ADMIN — LEVOTHYROXINE SODIUM 125 MCG: 125 TABLET ORAL at 06:11

## 2020-12-11 RX ADMIN — Medication 1000 UNITS: at 08:07

## 2020-12-11 ASSESSMENT — ACTIVITIES OF DAILY LIVING (ADL)
ADLS_ACUITY_SCORE: 14

## 2020-12-11 ASSESSMENT — MIFFLIN-ST. JEOR: SCORE: 1426.38

## 2020-12-11 NOTE — PROGRESS NOTES
Vitals stable; denies pain at this time. Alert and oriented. Tolerating regular diet. Had bowel movement this morning. IV removed. Reviewed discharge instructions an follow up; verbalized understanding and agreement. Discharge augmentin obtained from pharmacy and given to patient. Waiting for ride; plan to leave around 1300

## 2020-12-11 NOTE — PLAN OF CARE
Pt tolerating regular diet, no nausea.  Has occasional abdominal pain, had 2 soft stools today.  Started on oxycodone, IV pain meds discontinued.  Pain has been well managed.  Pt given education materials on diverticulitis.  She's hoping to discharge tomorrow.

## 2020-12-11 NOTE — DISCHARGE SUMMARY
[facility]   Discharge Summary  Hospital Medicine       Date of Admission:  12/5/2020  Date of Discharge:  12/11/2020  1:35 PM  Discharging Provider: Binu Erickson MD      Identification and Chief Compaint: Nicci Pemberton is a 60 year old female who presented on 12/5/2020 with complaint of lower abdominal pain, low-grade subjective fever and 3 episodes of watery diarrhea.    Discharge Diagnoses   Diverticulitis of colon with abscess  COVID-19 test positive, asymptomatic  Status post liver transplantation (H)  Immunosuppressed status (H)  Hypothyroidism   Lymphedema     Follow-ups Needed After Discharge   Follow-up Appointments     Follow-up and recommended labs and tests       Follow up with primary care provider, Wale Thurston, within 7 days for   hospital follow- up.  The following labs/tests are recommended:   colonoscopy in 6-8 weeks.           Hospital Course      Diverticulitis of colon with abscess    Patient presents with lower abdominal pain and low-grade subjective fever at 99.9 associated with 3 episodes of watery diarrhea.  CT scan abdomen pelvis showed Urinary bladder wall thickening with soft tissue stranding around the bladder suspicious for cystitis. Small 2.0 x 1.5 cm fluid collection between the bladder and the uterus with early forming wall suspicious for possible abscess. There are multiple colonic diverticula present with the pelvic soft tissue stranding abutting the sigmoid colon. It is possible there has been locally perforated sigmoid diverticulitis as a cause of the likely small abscess in the pelvis.  Patient was started on ceftriaxone and Flagyl    Discussed with radiology - not able to tap/drain the abscess given size/location.      CRP and clinical appearance improving.  Tolerating diet better.  Completed 7 days of IV antibiotics and will complete another 7 days of oral antibiotics with amoxicillin-clavulanate. It is recommended for her to have a  colonoscopy in 6-8 weeks.     COVID-19 test positive, asymptomatic    COVID screen positive. No convincing symptoms of COVID.  No indication for dexamethasone or remdesivir.      - Will need longer isolation given immune suppressed state.       Status post liver transplantation (H)  Immunosuppressed status (H)    History of liver transplant secondary to ANGULO.  On cyclosporine 75 mg twice daily  Transplant hematology was contacted by the ED provider and the okay with admission to our hospital.  They also okay with the current plan of care  -Resume home medication cyclosporine 75 mg twice daily    Cyclosporin level normal 12/6.  Discussed with liver transplant team from [facility].  They are happy with cyclospiron dose/level for now, recommend recheck of cyclosporin level on Thurs or Fri if patient still inpatient.   Follow daily liver labs - they are happy with bili where it is with normal liver enzymes.  Said no changes or reason for transfer at present, they will follow labs and remain available.  Cyclosporine level is in therapeutic range again 12/10.      Hypothyroidism     Continue home Synthroid     Lymphedema    Patient has lymphedema wrap in place. Appears baseline.      Thrombocytopenia (H)    Chronic problem.  Platelet on admission 62.  No current issues with bleeding. Stable this admission.   Had appointment with Dr. Mckeon scheduled for 12/10/20 that needs rescheduling.       Elevated bilirubin, s/p hepatic transplant    Initial BiliT 2.0. Nothing on CT scan, looks like bili has typically been 1.5-2. This did normalize to 0.4 on discharge.      Consultations This Hospital Stay   SURGERY GENERAL IP CONSULT  CARE MANAGEMENT / SOCIAL WORK IP CONSULT    Code Status   Full Code    The discharge plan was discussed with the patient, and she expressed understanding.     Time Spent on this Encounter   Total time on this discharge was 25 minutes.       Binu Erickson MD  [facility]   ______________________________________________________________________    Physical Exam   Vital Signs: Temp: 96.8  F (36  C) Temp src: Oral BP: 122/79 Pulse: 64   Resp: 16 SpO2: 99 % O2 Device: None (Room air)    Weight: 199 lbs 1.21 oz  Constitutional: alert and oriented, NAD, nontoxic  CV: Regular  Respiratory: CTA bilaterally  GI: Soft, mild tenderness to moderate palpation in lower abdomen otherwise nontender  Skin: Warm and dry       Primary Care Physician   Wale Thurston  [facility] [address] [place] [address]     Discharge Disposition   Discharged to home  Condition at discharge: Stable    Significant Results and Procedures   Results for orders placed or performed during the hospital encounter of 12/05/20   CT Abdomen Pelvis w Contrast    Narrative    EXAM: CT ABDOMEN PELVIS W CONTRAST  LOCATION: [facility]  DATE/TIME: 12/5/2020 7:35 PM    INDICATION: Lower abdominal pain.  COMPARISON: 07/31/2019.  TECHNIQUE: CT scan of the abdomen and pelvis was performed following injection of IV contrast. Multiplanar reformats were obtained. Dose reduction techniques were used.  CONTRAST: 94 mL Isovue-370.    FINDINGS:   LOWER CHEST: Normal.    HEPATOBILIARY: Interval hepatic transplant. No ascites.    PANCREAS: Normal.    SPLEEN: Normal.    ADRENAL GLANDS: Normal.    KIDNEYS/BLADDER: Normal.    BOWEL: Multiple sigmoid diverticula present.    LYMPH NODES: Normal.    VASCULATURE: Unremarkable.    PELVIC ORGANS: Urinary bladder wall thickening with soft tissue haziness around the bladder suspicious for cystitis. There is a small 2.0 x 1.5 cm fluid collection between the bladder and anterior body of the uterus with early forming wall suspicious for   abscess.    MUSCULOSKELETAL: Normal.      Impression    IMPRESSION:   1.  Urinary bladder wall thickening with soft tissue stranding around the bladder suspicious for cystitis. Small 2.0 x 1.5 cm fluid collection between the bladder  and the uterus with early forming wall suspicious for possible abscess. There are multiple   colonic diverticula present with the pelvic soft tissue stranding abutting the sigmoid colon. It is possible there has been locally perforated sigmoid diverticulitis as a cause of the presumed small forming abscess in the pelvis. There are at least 2   tiny locules of air within the fluid collection.     Procedures    None    Discharge Orders      Discharge - Quarantine/Isolation Instruction    Date of first positive test:  12/5/2020  Stay home and away from others (self-isolate) for at least 20 days from when your positive test And...   You've had no fevers for 3 full days (72 hours) And...   Your other symptoms have resolved (gotten better).     Reason for your hospital stay    Diverticulitis with small perforation and small abscess that should clear with the antibiotics.     Follow-up and recommended labs and tests     Follow up with primary care provider, Wale Thurston, within 7 days for hospital follow- up.  The following labs/tests are recommended: colonoscopy in 6-8 weeks.     Activity    Your activity upon discharge: activity as tolerated     Diet    Follow this diet upon discharge:  Regular Diet Adult;     Discharge Medications   Current Discharge Medication List      START taking these medications    Details   amoxicillin-clavulanate (AUGMENTIN) 875-125 MG tablet Take 1 tablet by mouth every 12 hours for 7 days  Qty: 14 tablet, Refills: 0    Associated Diagnoses: Diverticulitis of colon         CONTINUE these medications which have NOT CHANGED    Details   acetaminophen (TYLENOL) 325 MG tablet Take 2 tablets (650 mg) by mouth every 4 hours  Qty: 100 tablet, Refills: 0    Associated Diagnoses: Chronic pain of both knees      Cholecalciferol (VITAMIN D-3) 25 MCG (1000 UT) CAPS Take 1,000 Units by mouth 2 times daily  Qty: 60 capsule, Refills: 1    Associated Diagnoses: Severe malnutrition (H)      cycloSPORINE  modified (GENERIC EQUIVALENT) 25 MG capsule Take 3 capsules (75 mg) by mouth 2 times daily  Qty: 540 capsule, Refills: 3    Associated Diagnoses: Liver replaced by transplant (H)      famotidine (PEPCID) 20 MG tablet Take 1 tablet (20 mg) by mouth 2 times daily  Qty: 60 tablet, Refills: 3    Associated Diagnoses: Status post liver transplantation (H)      levothyroxine (SYNTHROID/LEVOTHROID) 125 MCG tablet Take 125 mcg by mouth daily     Comments: Managed by PCP  Associated Diagnoses: Status post liver transplantation (H)           Allergies   Allergies   Allergen Reactions     Ciprofloxacin Dizziness and Nausea     Food      Fruits with cores and pits  Apples     Sulfa Drugs Other (See Comments)     Swollen joints     Furosemide Rash

## 2020-12-11 NOTE — PLAN OF CARE
Pt denied need for pain med this morning. Last oxycodone given at 2250. Pt up indep. No stool this shift. Afebrile. Denies nausea, tolerating po fluids. On RA.

## 2020-12-11 NOTE — PLAN OF CARE
Pt up indep in room. Denies nausea, tolerating po fluids, has had broth & water this tanja. Rating abdominal pain 5/10 & received oxycodone 5mg at 2045. Sats on %, lungs clear. Has been afebrile.

## 2020-12-11 NOTE — TELEPHONE ENCOUNTER
Patient Call: Voicemail  Date/Time: 12/11/20 350pm  Reason for call: Patient left a message requesting a call from Zina. She is home from hospital and would like to touch base with you

## 2020-12-14 DIAGNOSIS — D69.6 THROMBOCYTOPENIA (H): Primary | ICD-10-CM

## 2020-12-16 DIAGNOSIS — D69.6 THROMBOCYTOPENIA (H): ICD-10-CM

## 2020-12-16 DIAGNOSIS — K57.32 DIVERTICULITIS OF COLON: Chronic | ICD-10-CM

## 2020-12-16 LAB
ALBUMIN SERPL-MCNC: 3.3 G/DL (ref 3.4–5)
ALP SERPL-CCNC: 125 U/L (ref 40–150)
ALT SERPL W P-5'-P-CCNC: 27 U/L (ref 0–50)
ANION GAP SERPL CALCULATED.3IONS-SCNC: 2 MMOL/L (ref 3–14)
AST SERPL W P-5'-P-CCNC: 24 U/L (ref 0–45)
BILIRUB DIRECT SERPL-MCNC: 0.2 MG/DL (ref 0–0.2)
BILIRUB SERPL-MCNC: 0.8 MG/DL (ref 0.2–1.3)
BUN SERPL-MCNC: 24 MG/DL (ref 7–30)
CALCIUM SERPL-MCNC: 9.5 MG/DL (ref 8.5–10.1)
CHLORIDE SERPL-SCNC: 105 MMOL/L (ref 94–109)
CO2 SERPL-SCNC: 30 MMOL/L (ref 20–32)
CREAT SERPL-MCNC: 1.05 MG/DL (ref 0.52–1.04)
ERYTHROCYTE [DISTWIDTH] IN BLOOD BY AUTOMATED COUNT: 12.1 % (ref 10–15)
GFR SERPL CREATININE-BSD FRML MDRD: 57 ML/MIN/{1.73_M2}
GLUCOSE SERPL-MCNC: 96 MG/DL (ref 70–99)
HCT VFR BLD AUTO: 35.6 % (ref 35–47)
HGB BLD-MCNC: 11.5 G/DL (ref 11.7–15.7)
MCH RBC QN AUTO: 32.5 PG (ref 26.5–33)
MCHC RBC AUTO-ENTMCNC: 32.3 G/DL (ref 31.5–36.5)
MCV RBC AUTO: 101 FL (ref 78–100)
PLATELET # BLD AUTO: 120 10E9/L (ref 150–450)
POTASSIUM SERPL-SCNC: 4.9 MMOL/L (ref 3.4–5.3)
PROT SERPL-MCNC: 7.4 G/DL (ref 6.8–8.8)
RBC # BLD AUTO: 3.54 10E12/L (ref 3.8–5.2)
SODIUM SERPL-SCNC: 137 MMOL/L (ref 133–144)
WBC # BLD AUTO: 4.9 10E9/L (ref 4–11)

## 2020-12-16 PROCEDURE — 36415 COLL VENOUS BLD VENIPUNCTURE: CPT | Performed by: INTERNAL MEDICINE

## 2020-12-16 PROCEDURE — 80076 HEPATIC FUNCTION PANEL: CPT | Performed by: INTERNAL MEDICINE

## 2020-12-16 PROCEDURE — 85027 COMPLETE CBC AUTOMATED: CPT | Performed by: INTERNAL MEDICINE

## 2020-12-16 PROCEDURE — 80048 BASIC METABOLIC PNL TOTAL CA: CPT | Performed by: INTERNAL MEDICINE

## 2020-12-28 ENCOUNTER — TELEPHONE (OUTPATIENT)
Dept: TRANSPLANT | Facility: CLINIC | Age: 60
End: 2020-12-28

## 2020-12-28 DIAGNOSIS — Z94.4 STATUS POST LIVER TRANSPLANTATION (H): Primary | ICD-10-CM

## 2020-12-28 NOTE — TELEPHONE ENCOUNTER
Pt is having ivis colored stool. Pt will complete labs.    Pt did not hear back regarding colo rectal appt. Message to schedulers.

## 2020-12-29 ENCOUNTER — TELEPHONE (OUTPATIENT)
Dept: SURGERY | Facility: CLINIC | Age: 60
End: 2020-12-29

## 2020-12-29 ENCOUNTER — HOSPITAL ENCOUNTER (OUTPATIENT)
Dept: PHYSICAL THERAPY | Facility: CLINIC | Age: 60
Setting detail: THERAPIES SERIES
End: 2020-12-29
Attending: ORTHOPAEDIC SURGERY
Payer: COMMERCIAL

## 2020-12-29 ENCOUNTER — TELEPHONE (OUTPATIENT)
Dept: ORTHOPEDICS | Facility: CLINIC | Age: 60
End: 2020-12-29

## 2020-12-29 DIAGNOSIS — Z13.220 LIPID SCREENING: ICD-10-CM

## 2020-12-29 DIAGNOSIS — Z94.4 STATUS POST LIVER TRANSPLANTATION (H): ICD-10-CM

## 2020-12-29 DIAGNOSIS — Z94.4 LIVER REPLACED BY TRANSPLANT (H): ICD-10-CM

## 2020-12-29 DIAGNOSIS — D69.1 PLATELET DYSFUNCTION (H): ICD-10-CM

## 2020-12-29 DIAGNOSIS — K57.32 DIVERTICULITIS OF COLON: Chronic | ICD-10-CM

## 2020-12-29 LAB
ALBUMIN SERPL-MCNC: 3.5 G/DL (ref 3.4–5)
ALP SERPL-CCNC: 136 U/L (ref 40–150)
ALT SERPL W P-5'-P-CCNC: 25 U/L (ref 0–50)
ANION GAP SERPL CALCULATED.3IONS-SCNC: 1 MMOL/L (ref 3–14)
AST SERPL W P-5'-P-CCNC: 34 U/L (ref 0–45)
BASOPHILS # BLD AUTO: 0 10E9/L (ref 0–0.2)
BASOPHILS NFR BLD AUTO: 0.2 %
BILIRUB DIRECT SERPL-MCNC: 0.2 MG/DL (ref 0–0.2)
BILIRUB SERPL-MCNC: 1.1 MG/DL (ref 0.2–1.3)
BUN SERPL-MCNC: 27 MG/DL (ref 7–30)
CALCIUM SERPL-MCNC: 9.4 MG/DL (ref 8.5–10.1)
CHLORIDE SERPL-SCNC: 106 MMOL/L (ref 94–109)
CO2 SERPL-SCNC: 31 MMOL/L (ref 20–32)
CREAT SERPL-MCNC: 1.08 MG/DL (ref 0.52–1.04)
DIFFERENTIAL METHOD BLD: ABNORMAL
EOSINOPHIL # BLD AUTO: 0.2 10E9/L (ref 0–0.7)
EOSINOPHIL NFR BLD AUTO: 5 %
ERYTHROCYTE [DISTWIDTH] IN BLOOD BY AUTOMATED COUNT: 12.3 % (ref 10–15)
GFR SERPL CREATININE-BSD FRML MDRD: 56 ML/MIN/{1.73_M2}
GLUCOSE SERPL-MCNC: 106 MG/DL (ref 70–99)
HCT VFR BLD AUTO: 36.8 % (ref 35–47)
HGB BLD-MCNC: 12 G/DL (ref 11.7–15.7)
LYMPHOCYTES # BLD AUTO: 0.9 10E9/L (ref 0.8–5.3)
LYMPHOCYTES NFR BLD AUTO: 18.9 %
MCH RBC QN AUTO: 32.2 PG (ref 26.5–33)
MCHC RBC AUTO-ENTMCNC: 32.6 G/DL (ref 31.5–36.5)
MCV RBC AUTO: 99 FL (ref 78–100)
MONOCYTES # BLD AUTO: 0.6 10E9/L (ref 0–1.3)
MONOCYTES NFR BLD AUTO: 11.9 %
NEUTROPHILS # BLD AUTO: 3 10E9/L (ref 1.6–8.3)
NEUTROPHILS NFR BLD AUTO: 64 %
PLATELET # BLD AUTO: 147 10E9/L (ref 150–450)
POTASSIUM SERPL-SCNC: 4.1 MMOL/L (ref 3.4–5.3)
PROT SERPL-MCNC: 7.8 G/DL (ref 6.8–8.8)
RBC # BLD AUTO: 3.73 10E12/L (ref 3.8–5.2)
SODIUM SERPL-SCNC: 138 MMOL/L (ref 133–144)
WBC # BLD AUTO: 4.6 10E9/L (ref 4–11)

## 2020-12-29 PROCEDURE — 97110 THERAPEUTIC EXERCISES: CPT | Mod: GP | Performed by: PHYSICAL THERAPIST

## 2020-12-29 PROCEDURE — 97140 MANUAL THERAPY 1/> REGIONS: CPT | Mod: GP | Performed by: PHYSICAL THERAPIST

## 2020-12-29 PROCEDURE — 36415 COLL VENOUS BLD VENIPUNCTURE: CPT | Performed by: INTERNAL MEDICINE

## 2020-12-29 PROCEDURE — 85025 COMPLETE CBC W/AUTO DIFF WBC: CPT | Performed by: INTERNAL MEDICINE

## 2020-12-29 PROCEDURE — 80076 HEPATIC FUNCTION PANEL: CPT | Performed by: INTERNAL MEDICINE

## 2020-12-29 PROCEDURE — 80048 BASIC METABOLIC PNL TOTAL CA: CPT | Performed by: INTERNAL MEDICINE

## 2020-12-29 NOTE — TELEPHONE ENCOUNTER
[facility] Call Center    Phone Message    May a detailed message be left on voicemail: yes     Reason for Call: Other: Pt is being referred for S/p liver tx. Small 2.0 x 1.5 cm fluid collection between the bladder and the uterus with early forming wall suspicious for possible abscess. There are multiple colonic diverticula present with the pelvic soft tissue stranding abutting the sigmoid colon. It is possible there has been locally perforated sigmoid diverticulitis. Pt states she was told she would not need a surgery, in the call center GL's CRS only see's Pt's for surgical options. Writer is unsure who Pt should be seen with. Please advise. Thank you!      Action Taken: Message routed to:  [facility] Colon and Rectal     Travel Screening: Not Applicable

## 2020-12-30 ENCOUNTER — TELEPHONE (OUTPATIENT)
Dept: TRANSPLANT | Facility: CLINIC | Age: 60
End: 2020-12-30

## 2020-12-30 DIAGNOSIS — Z94.4 STATUS POST LIVER TRANSPLANTATION (H): Primary | ICD-10-CM

## 2020-12-30 NOTE — TELEPHONE ENCOUNTER
Pt to have frequent platelet count check over the next month after ITP. Pt aware and will make own lab appt for next week. If labs stable repeat every other week.

## 2020-12-30 NOTE — TELEPHONE ENCOUNTER
RECORDS RECEIVED FROM: Internal   DATE RECEIVED: 1/13/2021   NOTES STATUS DETAILS   OFFICE NOTE from referring provider  Internal Internal recs in epic    DISCHARGE SUMMARY from hospital  Internal ED note 12/5/20   MEDICATION LIST Internal    IMAGING (DISC & REPORT)      CT  Internal 12/5/20   XRAY Internal XR ABD 6/10/20

## 2020-12-30 NOTE — TELEPHONE ENCOUNTER
Patient called regarding he Cyclosporine patient changed pharmacy from [facility] to [facility] and the manufacture is different.

## 2021-01-05 ENCOUNTER — HOSPITAL ENCOUNTER (OUTPATIENT)
Dept: PHYSICAL THERAPY | Facility: CLINIC | Age: 61
Setting detail: THERAPIES SERIES
End: 2021-01-05
Attending: ORTHOPAEDIC SURGERY
Payer: COMMERCIAL

## 2021-01-05 PROCEDURE — 97110 THERAPEUTIC EXERCISES: CPT | Mod: GP | Performed by: PHYSICAL THERAPIST

## 2021-01-05 PROCEDURE — 97140 MANUAL THERAPY 1/> REGIONS: CPT | Mod: GP | Performed by: PHYSICAL THERAPIST

## 2021-01-05 NOTE — PROGRESS NOTES
Outpatient Physical Therapy Progress Note     Patient: Nicci Pemberton  : 1960    Beginning/End Dates of Reporting Period:  10/28/2020 to 2021    Referring Provider: Mario Wallace MD    Therapy Diagnosis: R knee pain s/p R TKA     Client Self Report: Was unable to attend therapy due to having covid-19.  Reports that her knee is more stiff.  Has been working on HEP during her time off.    Objective Measurements:  Objective Measure: R knee ROM (start of session)  Details: flexion: 90*, extension: 0*. End of session: flexion=95*  Objective Measure: R knee strength  Details: Not assessed today  Objective Measure: LEFS  Details: Not assessed today    Goals:  Goal Identifier 1   Goal Description Pt will be able to stand for 15 minutes without increase in sx in order to decrease difficulty with ADLs.    Target Date 20   Date Met  21   Progress:     Goal Identifier 2   Goal Description Pt will be able to reach 0* knee extension in order to decrease difficulty with walking without AD.    Target Date 20   Date Met  21   Progress:     Goal Identifier 3 (90* at end of session)   Goal Description Pt will be able to flex R knee 110* in order to decrease difficulty with stairs and home navigation.    Target Date 20   Date Met      Progress:     Goal Identifier 4 (long term goal, I with current HEP)   Goal Description Pt will be independent with HEP in order to self manage symptoms.    Target Date 21   Date Met      Progress:     Goal Identifier 5 (unable at this time)   Goal Description Pt will be able to perform mini squat and demonstrate 5/5 R knee strength in order to decrease difficulty with ADLs.    Target Date 20   Date Met      Progress:     Progress Toward Goals:   Progress this reporting period: Pt has made limited progress during this reporting period due to medical complications and multiple hospital stays.  Pt contineus to demonstrate signficant ROM restrictions,  however pt maintained improvements from last session and demonstrates improvements following session.  Pt would benefit from continued physical therapy to continue to address ROM restrictions and progress strengthening.  Pt has met 2/5 goals.    Plan:  Continue therapy per current plan of care.    Discharge:  No

## 2021-01-07 ENCOUNTER — HOSPITAL ENCOUNTER (OUTPATIENT)
Dept: PHYSICAL THERAPY | Facility: CLINIC | Age: 61
Setting detail: THERAPIES SERIES
End: 2021-01-07
Attending: ORTHOPAEDIC SURGERY
Payer: COMMERCIAL

## 2021-01-07 DIAGNOSIS — Z94.4 STATUS POST LIVER TRANSPLANTATION (H): ICD-10-CM

## 2021-01-07 LAB
ERYTHROCYTE [DISTWIDTH] IN BLOOD BY AUTOMATED COUNT: 12 % (ref 10–15)
HCT VFR BLD AUTO: 37.4 % (ref 35–47)
HGB BLD-MCNC: 12.3 G/DL (ref 11.7–15.7)
MCH RBC QN AUTO: 31.8 PG (ref 26.5–33)
MCHC RBC AUTO-ENTMCNC: 32.9 G/DL (ref 31.5–36.5)
MCV RBC AUTO: 97 FL (ref 78–100)
PLATELET # BLD AUTO: 141 10E9/L (ref 150–450)
RBC # BLD AUTO: 3.87 10E12/L (ref 3.8–5.2)
WBC # BLD AUTO: 5.4 10E9/L (ref 4–11)

## 2021-01-07 PROCEDURE — 36415 COLL VENOUS BLD VENIPUNCTURE: CPT | Performed by: INTERNAL MEDICINE

## 2021-01-07 PROCEDURE — 85027 COMPLETE CBC AUTOMATED: CPT | Performed by: INTERNAL MEDICINE

## 2021-01-07 PROCEDURE — 97116 GAIT TRAINING THERAPY: CPT | Mod: GP | Performed by: PHYSICAL MEDICINE & REHABILITATION

## 2021-01-07 PROCEDURE — 97110 THERAPEUTIC EXERCISES: CPT | Mod: GP | Performed by: PHYSICAL MEDICINE & REHABILITATION

## 2021-01-07 PROCEDURE — 97112 NEUROMUSCULAR REEDUCATION: CPT | Mod: GP | Performed by: PHYSICAL MEDICINE & REHABILITATION

## 2021-01-08 ENCOUNTER — TELEPHONE (OUTPATIENT)
Dept: TRANSPLANT | Facility: CLINIC | Age: 61
End: 2021-01-08

## 2021-01-08 NOTE — TELEPHONE ENCOUNTER
"Call to Nicci.  She tells me that on Dec 5 she was admitted to [facility] for diverticulitis.  Was treated w/  Antibiotic.  Dc'd on the 11th on an oral antibiotic for 7 days..  Pain did not ever totally go away.  About 3 days ago she noted what she called a \"level 1 pain\". Sometimes pain gets to a \"2\".  Very low grade but she is concerned that it is there at all.    She does not have a fever.    She has a virtual visit with colorectal next week, Dr. Holly next week.    She told me that Dr. Leventhal has been most involved w/ this and wanted me to let him know.  I told her I would let him know, suggested if pain worsens or she develops fever >101 she should to go ER.  Message to Dr. Leventhal.  "

## 2021-01-08 NOTE — TELEPHONE ENCOUNTER
Per Dr. Leventhal if pain gets worse Nicci should come to [facility].   for Nicci telling her this.

## 2021-01-12 ENCOUNTER — HOSPITAL ENCOUNTER (OUTPATIENT)
Dept: PHYSICAL THERAPY | Facility: CLINIC | Age: 61
Setting detail: THERAPIES SERIES
End: 2021-01-12
Attending: ORTHOPAEDIC SURGERY
Payer: COMMERCIAL

## 2021-01-12 PROCEDURE — 97110 THERAPEUTIC EXERCISES: CPT | Mod: GP | Performed by: PHYSICAL MEDICINE & REHABILITATION

## 2021-01-12 PROCEDURE — 97140 MANUAL THERAPY 1/> REGIONS: CPT | Mod: GP | Performed by: PHYSICAL MEDICINE & REHABILITATION

## 2021-01-13 ENCOUNTER — PRE VISIT (OUTPATIENT)
Dept: SURGERY | Facility: CLINIC | Age: 61
End: 2021-01-13

## 2021-01-13 ENCOUNTER — VIRTUAL VISIT (OUTPATIENT)
Dept: SURGERY | Facility: CLINIC | Age: 61
End: 2021-01-13
Attending: INTERNAL MEDICINE
Payer: COMMERCIAL

## 2021-01-13 VITALS — HEIGHT: 62 IN | WEIGHT: 205 LBS | BODY MASS INDEX: 37.73 KG/M2

## 2021-01-13 DIAGNOSIS — E66.01 MORBID OBESITY (H): ICD-10-CM

## 2021-01-13 DIAGNOSIS — K57.32 DIVERTICULITIS OF COLON: Primary | ICD-10-CM

## 2021-01-13 PROCEDURE — 99204 OFFICE O/P NEW MOD 45 MIN: CPT | Mod: 95 | Performed by: SURGERY

## 2021-01-13 ASSESSMENT — PAIN SCALES - GENERAL: PAINLEVEL: NO PAIN (0)

## 2021-01-13 ASSESSMENT — MIFFLIN-ST. JEOR: SCORE: 1453.12

## 2021-01-13 NOTE — NURSING NOTE
"Chief Complaint   Patient presents with     Consult     New patient consult for diverticulitis. - Video visit.        Vitals:    01/13/21 1156   Weight: 93 kg (205 lb)   Height: 1.575 m (5' 2\")       Body mass index is 37.49 kg/m .    Anabela Farooq LPN      "

## 2021-01-13 NOTE — PROGRESS NOTES
"Colon and Rectal Surgery Consult Video Note    Date: 2021     Referring provider:  Thomas Michael Leventhal, MD  [address] [place] [id number]     RE: Nicci Pemberton  : 1960  LALO: 2021    Nicci Pemberton is a 60 year old female who is being evaluated via a billable video visit.      The patient has been notified of following:     \"This video visit will be conducted via a call between you and your physician/provider. We have found that certain health care needs can be provided without the need for an in-person physical exam.  This service lets us provide the care you need with a video conversation.  If a prescription is necessary we can send it directly to your pharmacy.  If lab work is needed we can place an order for that and you can then stop by our lab to have the test done at a later time.    Video visits are billed at different rates depending on your insurance coverage.  Please reach out to your insurance provider with any questions.    If during the course of the call the physician/provider feels a video visit is not appropriate, you will not be charged for this service.\"    Patient has given verbal consent for Video visit? Yes    Video Start Time: 12:19 PM     Reason for visit: (?) Diverticulitis    HPI: 61 yo F with hx of liver transplantation (2/2 ANGULO, A1-antitrypsin deficiency) presenting with concerns for possible pelvic abscess secondary to diverticulitis. She presented to the ED on  with lower abdominal pain associated with diarrhea. CT revealed a small abscess between uterus and bladder, abutting the sigmoid colon with diverticula and surrounding soft tissue stranding. She responded well with combination IV/PO antibiotics. Since then, she reports that her pain has subsided. Last week, she reported some pain in her LLQ along with pressure. This has improved and essentially resolved for the past three days. This was her first episode of diverticulitis.    Last " colonoscopy in 2018:   Impression:  - The examined portion of the ileum was normal.                        - Diverticulosis in the sigmoid colon.                        - No specimens collected.   Recommendation:          - Discharge patient to home.                         - Repeat colonoscopy in 10 years for screening purposes.                         - Return to referring physician (date not yet determined).     Imaging personally reviewed by me:  CT A/P 12/5:  IMPRESSION:   1.  Urinary bladder wall thickening with soft tissue stranding around the bladder suspicious for cystitis. Small 2.0 x 1.5 cm fluid collection between the bladder and the uterus with early forming wall suspicious for possible abscess. There are multiple colonic diverticula present with the pelvic soft tissue stranding abutting the sigmoid colon. It is possible there has been locally perforated sigmoid diverticulitis as a cause of the presumed small forming abscess in the pelvis. There are at least 2 tiny locules of air within the fluid collection.    ASSESSMENT  It is possible although not definitively clear if the pelvic abscess is secondary to a microperforation secondary to sigmoid diverticulum, with the inflammation suppressed to some extent by the immunosuppression. She does have a history of diverticulosis to support the possibility of diverticulitis last month. Regardless, Ms. Pemberton remains a high risk candidate given her history of liver transplantation and need for immunosuppression. Thus, any decision to proceed operatively must be well thought out with a clear indication.     PLAN  1. Continue to monitor, given first episode of diverticulitis. She was agreeable to monitoring for now for recurrence. She will contact us if she develops any more pain.       Video-Visit Details    Type of service:  Video Visit    Video End Time (time video stopped): 12:58    Originating Location (pt. Location): Home    Distant Location (provider  location):  [facility] [place]     Mode of Communication:  Video Conference via [employer]     Medical history:  Past Medical History:   Diagnosis Date     Anemia      Cellulitis      Cirrhosis of liver (H) 06/18/2018     Heterozygous alpha 1-antitrypsin deficiency (H)      High hepatic iron concentration determined by biopsy of liver      Liver cirrhosis secondary to ANGULO (H)      Liver transplanted (H) 08/18/2019     Lymphedema      Osteoarthritis     knees     SBP (spontaneous bacterial peritonitis) (H) 08/02/2019 8/1/19 in [facility]     Thrombocytopenia (H)      Thyroid disease        Surgical history:  Past Surgical History:   Procedure Laterality Date     ARTHROPLASTY KNEE Right 10/23/2020    Procedure: Right  total knee arthroplasty;  Surgeon: Mario Wallace MD;  Location: [facility] OR     BENCH LIVER N/A 8/18/2019    Procedure: BACKBENCH PREPARATION, LIVER;  Surgeon: Mandeep Alford MD;  Location: [facility]     COLONOSCOPY N/A 6/22/2018    Procedure: COLONOSCOPY;  colonoscopy;  Surgeon: Hugo Patel MD;  [facility]: [facility]     ENDOSCOPIC RETROGRADE CHOLANGIOPANCREATOGRAM N/A 2/10/2020    Procedure: ENDOSCOPIC RETROGRADE CHOLANGIOPANCREATOGRAPHY with spinchterotomy;  Surgeon: Guru Rigoberto Canada MD;  [facility]     ENDOSCOPIC RETROGRADE CHOLANGIOPANCREATOGRAM N/A 5/13/2020    Procedure: ENDOSCOPIC RETROGRADE CHOLANGIOPANCREATOGRAPHY,Stent exchange and sludge removal;  Surgeon: Guru Rigoberto Canada MD;  [facility]     ENDOSCOPIC RETROGRADE CHOLANGIOPANCREATOGRAPHY, EXCHANGE TUBE/STENT N/A 3/4/2020    Procedure: ENDOSCOPIC RETROGRADE CHOLANGIOPANCREATOGRAPHY, WITH biliary dilarion, stent exchange, stone removal;  Surgeon: Guru Rigoberto Canada MD;  [facility]     ESOPHAGOSCOPY, GASTROSCOPY, DUODENOSCOPY (EGD), COMBINED N/A 10/31/2019    Procedure: Esophagogastroduodenoscopy, With Biopsy;  Surgeon: Nerissa Aranda  Inder HWANG MD;  Location: [facility] GI     IR FEEDING TUBE PLACEMENT MERCEDEZ PRESTON/MD  2019     IR FLUORO 0-1 HOUR  2019     IR LUMBAR PUNCTURE  2019     KNEE SURGERY  10/23/20     TRANSPLANT LIVER RECIPIENT  DONOR N/A 2019    Procedure: TRANSPLANT, LIVER, RECIPIENT,  DONOR;  Surgeon: Mandeep Alford MD;  Location[facility]       Problem list:  Patient Active Problem List    Diagnosis Date Noted     Diverticulitis of colon 2020     Priority: Medium     Suspected 2019 novel coronavirus infection 2020     Priority: Medium     Bilateral edema of lower extremity 2020     Priority: Medium     Physical debility 2020     Priority: Medium     Abnormal liver function 2020     Priority: Medium     Hypertension, unspecified type 2020     Priority: Medium     Calculus of gallbladder with acute cholecystitis without obstruction 2020     Priority: Medium     Gastroesophageal reflux disease without esophagitis 2020     Priority: Medium     Hyponatremia 2020     Priority: Medium     Lymphedema 2020     Priority: Medium     Macrocytosis without anemia 2020     Priority: Medium     Malaise 2020     Priority: Medium     Fatty liver disease, nonalcoholic 2020     Priority: Medium     Obstruction of bile duct 2020     Priority: Medium     Osteoarthritis of knee 2020     Priority: Medium     Post-menopausal bleeding 2020     Priority: Medium     Slow transit constipation 2020     Priority: Medium     Thrombocytopenia (H) 2020     Priority: Medium     Zinc deficiency 2020     Priority: Medium     Common bile duct stenosis 03/10/2020     Priority: Medium     Added automatically from request for surgery [id number]       Biliary stricture 03/10/2020     Priority: Medium     Added automatically from request for surgery [id number]       Cholangitis 2020     Priority: Medium     Bile duct stricture  02/12/2020     Priority: Medium     Added automatically from request for surgery [id number]       Otitis media 10/06/2019     Priority: Medium     Vertigo 10/04/2019     Priority: Medium     History of liver recipient (H) 09/23/2019     Priority: Medium     C. difficile diarrhea 09/19/2019     Priority: Medium     Steroid-induced hyperglycemia 09/19/2019     Priority: Medium     Immunosuppressed status (H) 09/19/2019     Priority: Medium     Status post liver transplantation (H) 08/18/2019     Priority: Medium     Enterocolitis 07/31/2019     Priority: Medium     Chronic pain of both knees 07/29/2019     Priority: Medium     Venous hypertension of lower extremity, bilateral 07/29/2019     Priority: Medium     Cramp in lower extremity associated with sleep 07/29/2019     Priority: Medium     Leg swelling 07/29/2019     Priority: Medium     Osteoarthritis of both knees, unspecified osteoarthritis type 07/29/2019     Priority: Medium     Liver cirrhosis secondary to ANGULO (H)      Priority: Medium     Heterozygous alpha 1-antitrypsin deficiency (H)      Priority: Medium     Iron overload      Priority: Medium     Obesity with serious comorbidity 08/23/2017     Priority: Medium     Acquired lymphedema of leg 11/23/2016     Priority: Medium     Edema 11/23/2016     Priority: Medium     Hypothyroidism 11/23/2016     Priority: Medium     Peripheral neuropathy 11/23/2016     Priority: Medium     Vitamin D deficiency 11/23/2016     Priority: Medium       Medications:  Current Outpatient Medications   Medication Sig Dispense Refill     acetaminophen (TYLENOL) 325 MG tablet Take 2 tablets (650 mg) by mouth every 4 hours 100 tablet 0     Cholecalciferol (VITAMIN D-3) 25 MCG (1000 UT) CAPS Take 1,000 Units by mouth 2 times daily 60 capsule 1     cycloSPORINE modified (GENERIC EQUIVALENT) 25 MG capsule Take 3 capsules (75 mg) by mouth 2 times daily 540 capsule 3     famotidine (PEPCID) 20 MG tablet Take 1 tablet (20 mg) by mouth 2  times daily (Patient taking differently: Take 20 mg by mouth daily ) 60 tablet 3     levothyroxine (SYNTHROID/LEVOTHROID) 125 MCG tablet Take 125 mcg by mouth daily          Allergies:  Allergies   Allergen Reactions     Ciprofloxacin Dizziness and Nausea     Food      Fruits with cores and pits  Apples     Sulfa Drugs Other (See Comments)     Swollen joints     Furosemide Rash       Family history:  Family History   Problem Relation Age of Onset     Diabetes Maternal Grandfather         Diagnosed at age 83     Cirrhosis No family hx of      Liver Cancer No family hx of        Social history:  Social History     Tobacco Use     Smoking status: Former Smoker     Packs/day: 0.50     Years: 10.00     Pack years: 5.00     Types: Cigarettes     Start date: 2000     Quit date:      Years since quittin.2     Smokeless tobacco: Never Used   Substance Use Topics     Alcohol use: No    Marital status: .    Riki Holly MD  Division of Colon and Rectal Surgery  [facility]  [phone]

## 2021-01-19 ENCOUNTER — HOSPITAL ENCOUNTER (OUTPATIENT)
Dept: PHYSICAL THERAPY | Facility: CLINIC | Age: 61
Setting detail: THERAPIES SERIES
End: 2021-01-19
Attending: ORTHOPAEDIC SURGERY
Payer: COMMERCIAL

## 2021-01-19 DIAGNOSIS — E03.9 HYPOTHYROIDISM, UNSPECIFIED TYPE: Primary | ICD-10-CM

## 2021-01-19 PROCEDURE — 97140 MANUAL THERAPY 1/> REGIONS: CPT | Mod: GP | Performed by: PHYSICAL MEDICINE & REHABILITATION

## 2021-01-19 PROCEDURE — 97110 THERAPEUTIC EXERCISES: CPT | Mod: GP | Performed by: PHYSICAL MEDICINE & REHABILITATION

## 2021-01-21 ENCOUNTER — HOSPITAL ENCOUNTER (OUTPATIENT)
Dept: PHYSICAL THERAPY | Facility: CLINIC | Age: 61
Setting detail: THERAPIES SERIES
End: 2021-01-21
Attending: ORTHOPAEDIC SURGERY
Payer: COMMERCIAL

## 2021-01-21 PROCEDURE — 97140 MANUAL THERAPY 1/> REGIONS: CPT | Mod: GP | Performed by: PHYSICAL MEDICINE & REHABILITATION

## 2021-01-25 ENCOUNTER — TELEPHONE (OUTPATIENT)
Dept: ORTHOPEDICS | Facility: CLINIC | Age: 61
End: 2021-01-25

## 2021-01-25 NOTE — TELEPHONE ENCOUNTER
RN returned call to patient and talked to patient about physical therapy and expectations and timelines and modes of things to do to help with therapy.  RN advised patient using heatpack primarily for stiffness. Patient did state she has reached over 102 degrees and still dealing with nagging stiffness and pain but just wanted to know if this is normal.  RN answered patient's other questions.    Colten Avila RN

## 2021-01-26 ENCOUNTER — HOSPITAL ENCOUNTER (OUTPATIENT)
Dept: PHYSICAL THERAPY | Facility: CLINIC | Age: 61
Setting detail: THERAPIES SERIES
End: 2021-01-26
Attending: ORTHOPAEDIC SURGERY
Payer: COMMERCIAL

## 2021-01-26 DIAGNOSIS — Z13.220 LIPID SCREENING: ICD-10-CM

## 2021-01-26 DIAGNOSIS — Z94.4 STATUS POST LIVER TRANSPLANTATION (H): ICD-10-CM

## 2021-01-26 DIAGNOSIS — Z94.4 LIVER REPLACED BY TRANSPLANT (H): ICD-10-CM

## 2021-01-26 DIAGNOSIS — E03.9 HYPOTHYROIDISM, UNSPECIFIED TYPE: ICD-10-CM

## 2021-01-26 LAB
ALBUMIN SERPL-MCNC: 3.5 G/DL (ref 3.4–5)
ALP SERPL-CCNC: 165 U/L (ref 40–150)
ALT SERPL W P-5'-P-CCNC: 67 U/L (ref 0–50)
ANION GAP SERPL CALCULATED.3IONS-SCNC: <1 MMOL/L (ref 3–14)
AST SERPL W P-5'-P-CCNC: 67 U/L (ref 0–45)
BILIRUB DIRECT SERPL-MCNC: 0.3 MG/DL (ref 0–0.2)
BILIRUB SERPL-MCNC: 1.1 MG/DL (ref 0.2–1.3)
BUN SERPL-MCNC: 29 MG/DL (ref 7–30)
CALCIUM SERPL-MCNC: 9.5 MG/DL (ref 8.5–10.1)
CHLORIDE SERPL-SCNC: 106 MMOL/L (ref 94–109)
CO2 SERPL-SCNC: 31 MMOL/L (ref 20–32)
CREAT SERPL-MCNC: 1.02 MG/DL (ref 0.52–1.04)
CYCLOSPORINE BLD LC/MS/MS-MCNC: 99 UG/L (ref 50–400)
ERYTHROCYTE [DISTWIDTH] IN BLOOD BY AUTOMATED COUNT: 12.1 % (ref 10–15)
GFR SERPL CREATININE-BSD FRML MDRD: 59 ML/MIN/{1.73_M2}
GLUCOSE SERPL-MCNC: 95 MG/DL (ref 70–99)
HCT VFR BLD AUTO: 37.5 % (ref 35–47)
HGB BLD-MCNC: 12.2 G/DL (ref 11.7–15.7)
MCH RBC QN AUTO: 31.6 PG (ref 26.5–33)
MCHC RBC AUTO-ENTMCNC: 32.5 G/DL (ref 31.5–36.5)
MCV RBC AUTO: 97 FL (ref 78–100)
PLATELET # BLD AUTO: 134 10E9/L (ref 150–450)
POTASSIUM SERPL-SCNC: 4.8 MMOL/L (ref 3.4–5.3)
PROT SERPL-MCNC: 7.7 G/DL (ref 6.8–8.8)
RBC # BLD AUTO: 3.86 10E12/L (ref 3.8–5.2)
SODIUM SERPL-SCNC: 137 MMOL/L (ref 133–144)
TME LAST DOSE: 2215 H
TSH SERPL DL<=0.005 MIU/L-ACNC: 0.88 MU/L (ref 0.4–4)
WBC # BLD AUTO: 4.3 10E9/L (ref 4–11)

## 2021-01-26 PROCEDURE — 84443 ASSAY THYROID STIM HORMONE: CPT | Performed by: INTERNAL MEDICINE

## 2021-01-26 PROCEDURE — 97110 THERAPEUTIC EXERCISES: CPT | Mod: GP | Performed by: PHYSICAL MEDICINE & REHABILITATION

## 2021-01-26 PROCEDURE — 36415 COLL VENOUS BLD VENIPUNCTURE: CPT | Performed by: INTERNAL MEDICINE

## 2021-01-26 PROCEDURE — 80048 BASIC METABOLIC PNL TOTAL CA: CPT | Performed by: INTERNAL MEDICINE

## 2021-01-26 PROCEDURE — 80158 DRUG ASSAY CYCLOSPORINE: CPT | Performed by: INTERNAL MEDICINE

## 2021-01-26 PROCEDURE — 85027 COMPLETE CBC AUTOMATED: CPT | Performed by: INTERNAL MEDICINE

## 2021-01-26 PROCEDURE — 97140 MANUAL THERAPY 1/> REGIONS: CPT | Mod: GP | Performed by: PHYSICAL MEDICINE & REHABILITATION

## 2021-01-26 PROCEDURE — 80076 HEPATIC FUNCTION PANEL: CPT | Performed by: INTERNAL MEDICINE

## 2021-01-26 NOTE — PROGRESS NOTES
Outpatient Physical Therapy Progress Note     Patient: Nicci Pemberton  : 1960    Beginning/End Dates of Reporting Period:  10/28/2020 to 2021    Referring Provider: Mario Wallace MD    Therapy Diagnosis: R knee pain s/p TKA     Client Self Report: Pt reports both knees very sore this week. Attributes pain to long car ride (45 min) and cold weather. States she continues to work diligently on exercises. Spoke with surgical care team in the last week who told pt she could be painful and stiff for the next year. Pt feeling discouraged. Wondering if she should have gotten manipulation in [facility] when offered.     Objective Measurements:  Objective Measure: R knee ROM (end of therapy)  Details: 0-100* with overpressure    Objective Measure: R knee strength  Details: 4+/5    Goals:  Goal Identifier 1   Goal Description Pt will be able to stand for 15 minutes without increase in sx in order to decrease difficulty with ADLs.    Target Date 20   Date Met  21   Progress:     Goal Identifier 2   Goal Description Pt will be able to reach 0* knee extension in order to decrease difficulty with walking without AD.    Target Date 20   Date Met  21   Progress:     Goal Identifier 3   Goal Description Pt will be able to flex R knee 110* in order to decrease difficulty with stairs and home navigation.    Target Date 21   Date Met      Progress: (104* at best, 100* today)     Goal Identifier 4   Goal Description Pt will be independent with HEP in order to self manage symptoms.    Target Date 21   Date Met  21   Progress:     Goal Identifier 5    Goal Description Pt will be able to perform mini squat and demonstrate 5/5 R knee strength in order to decrease difficulty with ADLs.    Target Date 21   Date Met      Progress:(unable at this time due to pain and weakness)     Progress Toward Goals:   Progress this reporting period: Pt has attended 15 PT sessions, achieving 3/5  short term and long term goals. Pt continues to have difficulty with knee flexion due to pain, jt restrictions and muscle tightness. Despite stretches and manual therapy, pt at best able to reach 104* throughout entire plan of care. Currently, pt flexion 100* with manual overpressure and pain. PT and pt discussed and agreed upon following up with MD to discuss pain and residual restrictions at this time. Pt appropriate for continued skilled PT to continue to work on residual ROM and strength deficits s/p TKA in order to decrease difficulty with transfers and amb.    Plan:  Changes to therapy plan of care: date extension: 2x/week for additional 6 weeks. Date extension to remaining 2 goals.    Discharge:  No    Please contact me with any questions or concerns.    Thank you for your referral,     Martita Milligan, PT, DPT  Physical Therapist  [facility]  [address] [place] [address] [phone]

## 2021-01-28 ENCOUNTER — HOSPITAL ENCOUNTER (OUTPATIENT)
Dept: PHYSICAL THERAPY | Facility: CLINIC | Age: 61
Setting detail: THERAPIES SERIES
End: 2021-01-28
Attending: ORTHOPAEDIC SURGERY
Payer: COMMERCIAL

## 2021-01-28 PROCEDURE — 97110 THERAPEUTIC EXERCISES: CPT | Mod: GP | Performed by: PHYSICAL MEDICINE & REHABILITATION

## 2021-02-02 ENCOUNTER — TELEPHONE (OUTPATIENT)
Dept: TRANSPLANT | Facility: CLINIC | Age: 61
End: 2021-02-02

## 2021-02-02 ENCOUNTER — HOSPITAL ENCOUNTER (OUTPATIENT)
Dept: PHYSICAL THERAPY | Facility: CLINIC | Age: 61
Setting detail: THERAPIES SERIES
End: 2021-02-02
Attending: ORTHOPAEDIC SURGERY
Payer: COMMERCIAL

## 2021-02-02 DIAGNOSIS — Z13.220 LIPID SCREENING: ICD-10-CM

## 2021-02-02 DIAGNOSIS — Z94.4 STATUS POST LIVER TRANSPLANTATION (H): ICD-10-CM

## 2021-02-02 DIAGNOSIS — Z94.4 LIVER REPLACED BY TRANSPLANT (H): ICD-10-CM

## 2021-02-02 DIAGNOSIS — Z94.4 STATUS POST LIVER TRANSPLANTATION (H): Primary | ICD-10-CM

## 2021-02-02 LAB
ALBUMIN SERPL-MCNC: 3.5 G/DL (ref 3.4–5)
ALP SERPL-CCNC: 165 U/L (ref 40–150)
ALT SERPL W P-5'-P-CCNC: 63 U/L (ref 0–50)
ANION GAP SERPL CALCULATED.3IONS-SCNC: 4 MMOL/L (ref 3–14)
AST SERPL W P-5'-P-CCNC: 63 U/L (ref 0–45)
BILIRUB DIRECT SERPL-MCNC: 0.3 MG/DL (ref 0–0.2)
BILIRUB SERPL-MCNC: 1.6 MG/DL (ref 0.2–1.3)
BUN SERPL-MCNC: 29 MG/DL (ref 7–30)
CALCIUM SERPL-MCNC: 9.2 MG/DL (ref 8.5–10.1)
CHLORIDE SERPL-SCNC: 106 MMOL/L (ref 94–109)
CO2 SERPL-SCNC: 28 MMOL/L (ref 20–32)
CREAT SERPL-MCNC: 1.11 MG/DL (ref 0.52–1.04)
CYCLOSPORINE BLD LC/MS/MS-MCNC: 118 UG/L (ref 50–400)
ERYTHROCYTE [DISTWIDTH] IN BLOOD BY AUTOMATED COUNT: 12.3 % (ref 10–15)
GFR SERPL CREATININE-BSD FRML MDRD: 54 ML/MIN/{1.73_M2}
GLUCOSE SERPL-MCNC: 91 MG/DL (ref 70–99)
HCT VFR BLD AUTO: 36.5 % (ref 35–47)
HGB BLD-MCNC: 12.1 G/DL (ref 11.7–15.7)
MCH RBC QN AUTO: 31.8 PG (ref 26.5–33)
MCHC RBC AUTO-ENTMCNC: 33.2 G/DL (ref 31.5–36.5)
MCV RBC AUTO: 96 FL (ref 78–100)
PLATELET # BLD AUTO: 133 10E9/L (ref 150–450)
POTASSIUM SERPL-SCNC: 4.4 MMOL/L (ref 3.4–5.3)
PROT SERPL-MCNC: 7.5 G/DL (ref 6.8–8.8)
RBC # BLD AUTO: 3.8 10E12/L (ref 3.8–5.2)
SODIUM SERPL-SCNC: 138 MMOL/L (ref 133–144)
TME LAST DOSE: 2130 H
WBC # BLD AUTO: 4.7 10E9/L (ref 4–11)

## 2021-02-02 PROCEDURE — 97110 THERAPEUTIC EXERCISES: CPT | Mod: GP | Performed by: PHYSICAL MEDICINE & REHABILITATION

## 2021-02-02 PROCEDURE — 80158 DRUG ASSAY CYCLOSPORINE: CPT | Performed by: INTERNAL MEDICINE

## 2021-02-02 PROCEDURE — 85027 COMPLETE CBC AUTOMATED: CPT | Performed by: INTERNAL MEDICINE

## 2021-02-02 PROCEDURE — 80076 HEPATIC FUNCTION PANEL: CPT | Performed by: INTERNAL MEDICINE

## 2021-02-02 PROCEDURE — 97116 GAIT TRAINING THERAPY: CPT | Mod: GP | Performed by: PHYSICAL MEDICINE & REHABILITATION

## 2021-02-02 PROCEDURE — 80048 BASIC METABOLIC PNL TOTAL CA: CPT | Performed by: INTERNAL MEDICINE

## 2021-02-04 ENCOUNTER — HOSPITAL ENCOUNTER (OUTPATIENT)
Dept: ULTRASOUND IMAGING | Facility: CLINIC | Age: 61
End: 2021-02-04
Attending: INTERNAL MEDICINE
Payer: COMMERCIAL

## 2021-02-04 ENCOUNTER — OFFICE VISIT (OUTPATIENT)
Dept: GASTROENTEROLOGY | Facility: CLINIC | Age: 61
End: 2021-02-04
Attending: INTERNAL MEDICINE
Payer: COMMERCIAL

## 2021-02-04 VITALS
SYSTOLIC BLOOD PRESSURE: 109 MMHG | OXYGEN SATURATION: 95 % | WEIGHT: 205.5 LBS | HEART RATE: 98 BPM | TEMPERATURE: 98.2 F | BODY MASS INDEX: 37.59 KG/M2 | DIASTOLIC BLOOD PRESSURE: 78 MMHG

## 2021-02-04 DIAGNOSIS — Z20.822 EXPOSURE TO COVID-19 VIRUS: ICD-10-CM

## 2021-02-04 DIAGNOSIS — D84.9 IMMUNOSUPPRESSED STATUS (H): Chronic | ICD-10-CM

## 2021-02-04 DIAGNOSIS — D69.1 PLATELET DYSFUNCTION (H): ICD-10-CM

## 2021-02-04 DIAGNOSIS — K57.32 DIVERTICULITIS OF COLON: Chronic | ICD-10-CM

## 2021-02-04 DIAGNOSIS — E66.01 MORBID OBESITY (H): ICD-10-CM

## 2021-02-04 DIAGNOSIS — Z94.4 STATUS POST LIVER TRANSPLANTATION (H): ICD-10-CM

## 2021-02-04 DIAGNOSIS — D69.3 IDIOPATHIC THROMBOCYTOPENIC PURPURA (H): ICD-10-CM

## 2021-02-04 DIAGNOSIS — Z94.4 S/P LIVER TRANSPLANT (H): ICD-10-CM

## 2021-02-04 DIAGNOSIS — R94.5 ABNORMAL LIVER FUNCTION: Primary | ICD-10-CM

## 2021-02-04 PROBLEM — K83.1 OBSTRUCTION OF BILE DUCT (H): Status: RESOLVED | Noted: 2020-06-25 | Resolved: 2021-02-04

## 2021-02-04 PROBLEM — K52.9 ENTEROCOLITIS: Status: RESOLVED | Noted: 2019-07-31 | Resolved: 2021-02-04

## 2021-02-04 PROBLEM — K83.1 BILIARY STRICTURE (H): Status: RESOLVED | Noted: 2020-03-10 | Resolved: 2021-02-04

## 2021-02-04 PROBLEM — K80.00 CALCULUS OF GALLBLADDER WITH ACUTE CHOLECYSTITIS WITHOUT OBSTRUCTION: Status: RESOLVED | Noted: 2020-06-25 | Resolved: 2021-02-04

## 2021-02-04 PROCEDURE — 99215 OFFICE O/P EST HI 40 MIN: CPT | Performed by: INTERNAL MEDICINE

## 2021-02-04 PROCEDURE — 93975 VASCULAR STUDY: CPT | Mod: 26 | Performed by: RADIOLOGY

## 2021-02-04 PROCEDURE — 76700 US EXAM ABDOM COMPLETE: CPT

## 2021-02-04 ASSESSMENT — PAIN SCALES - GENERAL: PAINLEVEL: NO PAIN (0)

## 2021-02-04 NOTE — PROGRESS NOTES
"Date of Encounter: 2021     Nicci Pemberton is a 60 year old female with past medical history of obesity, lymphedema, and cirrhosis secondary to combination of nonalcoholic fatty liver disease, iron overload, and alpha-1 antitrypsin MZ phenotype who is s/p  donor liver transplant on 19 and presents in hepatology.     Since last seen in clinic she has had significant changes to her overall health  - In 2020 she underwent a right total knee arthroplasty.  Post-op has had issues with impaired mobility and pain.  Has been working with therapy  - In November she developed an acute drop in platelets to 10k, and was diagnosed with ITP.  Was treated with several days of dexamethasone 40mg for 5 days, and had rapid improvement and \"normalization\" of her platelets  - At the end of her treatment of she developed severe lower abdominal pain.  She presented to her local hospital and was found to have active diverticulitis with concerns of a microperforation with abscess formation near her bladder.  She was treated with IV antibiotics and then transitioned to p.o. antibiotics.  She recently had follow-up with colorectal surgery who felt that expectant management was the best at this time given her fairly recent liver transplantation and recent episode of ITP    Over the past 2 weeks she has had slight elevation in her liver tests of unclear etiology.  She underwent abdominal ultrasound this morning to further evaluate.      Surgical Course  - OLT date: 2019   - etiology: ANGULO/alpha 1  - donor: type DBD  - Induction IS: basiliximab  - CMV status D+/R+  - operation: Surgical technique caval replacement, biliary anastomosis: duct to duct  - CiT 335min, WiT 35min  - Episodes of Rejection: none  - Biliary complications:  Severe anastomotic stricture noted 2020, repeat stenting 2020  - Other complications of immediate post-operative course: delerium with d/c of tacrolimus and start of " cyclosporin    Medical history, surgical history, social history, family history, and medications all reviewed and updated    Review of systems was otherwise negative more specifically commented upon in the HPI    Physical Exam  - vitals reviewed  Gen: NAD  HEENT: anicteric  CV: RRR  Lungs: Normal respiratory excursion   Abd: + BS  Ext: warm, no edema  Neuro: A&Ox3  Skin: no jaundice    Labs: Reviewed  Procedures: Reviewed    Assessment and Plan:     S/P Liver Transplantation:   - 8/18/2019 with DBD donor for ANGULO/A1AT/iron overload  - Bi-caval with duct to duct  - Excellent allograft function at this time  -Postoperative course complicated by anastomotic biliary stricture  -Pending results of abdominal ultrasound to evaluate for recurrent stricture    -If no evidence of overt stricture we will plan to proceed with liver biopsy    -We will review the labs and imaging with advanced endoscopy staff  - Post- transplant labs per routine     Immunosuppression:   - Was transitioned to cyclosporin from tacrolimus for concerns of delerium in the immediate post transplant period.  -Continues on cyclosporine with goal trough dose to   - Will plan to check immunosuppression trough levels per protocol to assess for adequate target dosing and will assess CBC and kidney function for toxicity of medications    Kidney Health:   - Intermittent acute kidney injury.  This is felt likely secondary to calcineurin inhibitor exposure as well as multiple medical comorbidities over the past year  -No overt signs of chronic kidney disease at this time    Covid  -Discussed with the patient that even though she tested positive for Covid in December, she was asymptomatic, and we would recommend that she still undergo vaccination  -Due to her history of ITP, I would recommend that we repeat CBC weekly after her receiving vaccination as there are reports of people developing recurrent ITP after receiving the Covid vaccine     Recent history  of diverticulitis:  -She is already seen colorectal surgery in consultation at our request.  Noted watchful waiting at this time  -No overt indication for repeat colonoscopy or antibiotics at this time  Nutrition/Weight:    It is very common for patients to put on significant weight after liver transplantation, as they become conditioned to eating as much as possible to maintain a healthy weight pre-transplant.  There is a very significant risk of developing fatty liver and non-alcoholic steatohepatitis in post-transplant patients, even in those who were not transplanted for ANGULO cirrhosis.  - Please work on eating a diet that is rice in fresh fruits and vegetables, moderate amounts of lean protein and dairy, and modest amounts of carbohydrates and fats.  - An exercise plan/regimen is an essential part of remaining healthy in the post-transplant setting and we recommend 30-35 minutes of exercise at least 4 days a week     Routine Health Care:  - You need to establish and maintain care with a primary care physician to manage: hypertension, high cholesterol, abnormal blood sugars - as these are all potential complications of your health after liver transplantation and will need a local physician to manage these issues.  - All patients with liver diseaes (even post transplant) are at an increased risk for osteoporosis.  We strongly recommend screening for Vitamin D deficiency at least twice yearly with aggressive supplementation/replacement as indicated.    - We also recommend a screening DEXA scan to evaluate for osteoporosis.  If present, should treat with calcium, Vitamin D supplementation, and recommend consideration of bisphosphonate therapy.  Also recommend follow up DEXA scans to evaluate for improvement of bone density on therapy.  - Recommend yearly skin exams with dermatology, and if skin cancers are found, recommend twice yearly exams  - Recommend you stay up to date for cancer screening: mammograms/PAP for  women and prostate cancer screening for men, and colon cancer screening for all.  - Practice good hygiene with washing of hands and maintaining a clean living space as this will decrease your chances of developing an infection in the post-transplantation setting  - Eat a health diet: rich in fresh fruits and vegetables, lean proteins, and minimize carbohydrate and fat intake.    Follow up: 6 months    Thomas M. Leventhal, M.D.   of Medicine  Advanced & Transplant Hepatology  [facility]

## 2021-02-04 NOTE — LETTER
"    2021         RE: Nicci Pemberton  [address] [place] [address]        Dear Colleague,    Thank you for referring your patient, Nicci Pemberton, to the [facility] [place]. Please see a copy of my visit note below.    Date of Encounter: 2021     Nicci Pemberton is a 60 year old female with past medical history of obesity, lymphedema, and cirrhosis secondary to combination of nonalcoholic fatty liver disease, iron overload, and alpha-1 antitrypsin MZ phenotype who is s/p  donor liver transplant on 19 and presents in hepatology.     Since last seen in clinic she has had significant changes to her overall health  - In 2020 she underwent a right total knee arthroplasty.  Post-op has had issues with impaired mobility and pain.  Has been working with therapy  - In November she developed an acute drop in platelets to 10k, and was diagnosed with ITP.  Was treated with several days of dexamethasone 40mg for 5 days, and had rapid improvement and \"normalization\" of her platelets  - At the end of her treatment of she developed severe lower abdominal pain.  She presented to her local hospital and was found to have active diverticulitis with concerns of a microperforation with abscess formation near her bladder.  She was treated with IV antibiotics and then transitioned to p.o. antibiotics.  She recently had follow-up with colorectal surgery who felt that expectant management was the best at this time given her fairly recent liver transplantation and recent episode of ITP    Over the past 2 weeks she has had slight elevation in her liver tests of unclear etiology.  She underwent abdominal ultrasound this morning to further evaluate.      Surgical Course  - OLT date: 2019   - etiology: ANGULO/alpha 1  - donor: type DBD  - Induction IS: basiliximab  - CMV status D+/R+  - operation: Surgical technique caval replacement, biliary anastomosis: duct to duct  - " CiT 335min, WiT 35min  - Episodes of Rejection: none  - Biliary complications:  Severe anastomotic stricture noted February 2020, repeat stenting March 2020  - Other complications of immediate post-operative course: delerium with d/c of tacrolimus and start of cyclosporin    Medical history, surgical history, social history, family history, and medications all reviewed and updated    Review of systems was otherwise negative more specifically commented upon in the HPI    Physical Exam  - vitals reviewed  Gen: NAD  HEENT: anicteric  CV: RRR  Lungs: Normal respiratory excursion   Abd: + BS  Ext: warm, no edema  Neuro: A&Ox3  Skin: no jaundice    Labs: Reviewed  Procedures: Reviewed    Assessment and Plan:     S/P Liver Transplantation:   - 8/18/2019 with DBD donor for ANGULO/A1AT/iron overload  - Bi-caval with duct to duct  - Excellent allograft function at this time  -Postoperative course complicated by anastomotic biliary stricture  -Pending results of abdominal ultrasound to evaluate for recurrent stricture    -If no evidence of overt stricture we will plan to proceed with liver biopsy    -We will review the labs and imaging with advanced endoscopy staff  - Post- transplant labs per routine     Immunosuppression:   - Was transitioned to cyclosporin from tacrolimus for concerns of delerium in the immediate post transplant period.  -Continues on cyclosporine with goal trough dose to   - Will plan to check immunosuppression trough levels per protocol to assess for adequate target dosing and will assess CBC and kidney function for toxicity of medications    Kidney Health:   - Intermittent acute kidney injury.  This is felt likely secondary to calcineurin inhibitor exposure as well as multiple medical comorbidities over the past year  -No overt signs of chronic kidney disease at this time    Covid  -Discussed with the patient that even though she tested positive for Covid in December, she was asymptomatic, and we  would recommend that she still undergo vaccination  -Due to her history of ITP, I would recommend that we repeat CBC weekly after her receiving vaccination as there are reports of people developing recurrent ITP after receiving the Covid vaccine     Recent history of diverticulitis:  -She is already seen colorectal surgery in consultation at our request.  Noted watchful waiting at this time  -No overt indication for repeat colonoscopy or antibiotics at this time  Nutrition/Weight:    It is very common for patients to put on significant weight after liver transplantation, as they become conditioned to eating as much as possible to maintain a healthy weight pre-transplant.  There is a very significant risk of developing fatty liver and non-alcoholic steatohepatitis in post-transplant patients, even in those who were not transplanted for ANGULO cirrhosis.  - Please work on eating a diet that is rice in fresh fruits and vegetables, moderate amounts of lean protein and dairy, and modest amounts of carbohydrates and fats.  - An exercise plan/regimen is an essential part of remaining healthy in the post-transplant setting and we recommend 30-35 minutes of exercise at least 4 days a week     Routine Health Care:  - You need to establish and maintain care with a primary care physician to manage: hypertension, high cholesterol, abnormal blood sugars - as these are all potential complications of your health after liver transplantation and will need a local physician to manage these issues.  - All patients with liver diseaes (even post transplant) are at an increased risk for osteoporosis.  We strongly recommend screening for Vitamin D deficiency at least twice yearly with aggressive supplementation/replacement as indicated.    - We also recommend a screening DEXA scan to evaluate for osteoporosis.  If present, should treat with calcium, Vitamin D supplementation, and recommend consideration of bisphosphonate therapy.  Also  recommend follow up DEXA scans to evaluate for improvement of bone density on therapy.  - Recommend yearly skin exams with dermatology, and if skin cancers are found, recommend twice yearly exams  - Recommend you stay up to date for cancer screening: mammograms/PAP for women and prostate cancer screening for men, and colon cancer screening for all.  - Practice good hygiene with washing of hands and maintaining a clean living space as this will decrease your chances of developing an infection in the post-transplantation setting  - Eat a health diet: rich in fresh fruits and vegetables, lean proteins, and minimize carbohydrate and fat intake.    Follow up: 6 months    Thomas M. Leventhal, M.D.   of Medicine  Advanced & Transplant Hepatology  [facility]

## 2021-02-04 NOTE — LETTER
"    2021         RE: Nicci Pemberton  [address] [place] [address]      Date of Encounter: 2021     Nicci Pemberton is a 60 year old female with past medical history of obesity, lymphedema, and cirrhosis secondary to combination of nonalcoholic fatty liver disease, iron overload, and alpha-1 antitrypsin MZ phenotype who is s/p  donor liver transplant on 19 and presents in hepatology.     Since last seen in clinic she has had significant changes to her overall health  - In 2020 she underwent a right total knee arthroplasty.  Post-op has had issues with impaired mobility and pain.  Has been working with therapy  - In November she developed an acute drop in platelets to 10k, and was diagnosed with ITP.  Was treated with several days of dexamethasone 40mg for 5 days, and had rapid improvement and \"normalization\" of her platelets  - At the end of her treatment of she developed severe lower abdominal pain.  She presented to her local hospital and was found to have active diverticulitis with concerns of a microperforation with abscess formation near her bladder.  She was treated with IV antibiotics and then transitioned to p.o. antibiotics.  She recently had follow-up with colorectal surgery who felt that expectant management was the best at this time given her fairly recent liver transplantation and recent episode of ITP    Over the past 2 weeks she has had slight elevation in her liver tests of unclear etiology.  She underwent abdominal ultrasound this morning to further evaluate.      Surgical Course  - OLT date: 2019   - etiology: ANGULO/alpha 1  - donor: type DBD  - Induction IS: basiliximab  - CMV status D+/R+  - operation: Surgical technique caval replacement, biliary anastomosis: duct to duct  - CiT 335min, WiT 35min  - Episodes of Rejection: none  - Biliary complications:  Severe anastomotic stricture noted 2020, repeat stenting 2020  - Other " complications of immediate post-operative course: delerium with d/c of tacrolimus and start of cyclosporin    Medical history, surgical history, social history, family history, and medications all reviewed and updated    Review of systems was otherwise negative more specifically commented upon in the HPI    Physical Exam  - vitals reviewed  Gen: NAD  HEENT: anicteric  CV: RRR  Lungs: Normal respiratory excursion   Abd: + BS  Ext: warm, no edema  Neuro: A&Ox3  Skin: no jaundice    Labs: Reviewed  Procedures: Reviewed    Assessment and Plan:     S/P Liver Transplantation:   - 8/18/2019 with DBD donor for ANGULO/A1AT/iron overload  - Bi-caval with duct to duct  - Excellent allograft function at this time  -Postoperative course complicated by anastomotic biliary stricture  -Pending results of abdominal ultrasound to evaluate for recurrent stricture    -If no evidence of overt stricture we will plan to proceed with liver biopsy    -We will review the labs and imaging with advanced endoscopy staff  - Post- transplant labs per routine     Immunosuppression:   - Was transitioned to cyclosporin from tacrolimus for concerns of delerium in the immediate post transplant period.  -Continues on cyclosporine with goal trough dose to   - Will plan to check immunosuppression trough levels per protocol to assess for adequate target dosing and will assess CBC and kidney function for toxicity of medications    Kidney Health:   - Intermittent acute kidney injury.  This is felt likely secondary to calcineurin inhibitor exposure as well as multiple medical comorbidities over the past year  -No overt signs of chronic kidney disease at this time    Covid  -Discussed with the patient that even though she tested positive for Covid in December, she was asymptomatic, and we would recommend that she still undergo vaccination  -Due to her history of ITP, I would recommend that we repeat CBC weekly after her receiving vaccination as there are  reports of people developing recurrent ITP after receiving the Covid vaccine     Recent history of diverticulitis:  -She is already seen colorectal surgery in consultation at our request.  Noted watchful waiting at this time  -No overt indication for repeat colonoscopy or antibiotics at this time  Nutrition/Weight:    It is very common for patients to put on significant weight after liver transplantation, as they become conditioned to eating as much as possible to maintain a healthy weight pre-transplant.  There is a very significant risk of developing fatty liver and non-alcoholic steatohepatitis in post-transplant patients, even in those who were not transplanted for ANGULO cirrhosis.  - Please work on eating a diet that is rice in fresh fruits and vegetables, moderate amounts of lean protein and dairy, and modest amounts of carbohydrates and fats.  - An exercise plan/regimen is an essential part of remaining healthy in the post-transplant setting and we recommend 30-35 minutes of exercise at least 4 days a week     Routine Health Care:  - You need to establish and maintain care with a primary care physician to manage: hypertension, high cholesterol, abnormal blood sugars - as these are all potential complications of your health after liver transplantation and will need a local physician to manage these issues.  - All patients with liver diseaes (even post transplant) are at an increased risk for osteoporosis.  We strongly recommend screening for Vitamin D deficiency at least twice yearly with aggressive supplementation/replacement as indicated.    - We also recommend a screening DEXA scan to evaluate for osteoporosis.  If present, should treat with calcium, Vitamin D supplementation, and recommend consideration of bisphosphonate therapy.  Also recommend follow up DEXA scans to evaluate for improvement of bone density on therapy.  - Recommend yearly skin exams with dermatology, and if skin cancers are found,  recommend twice yearly exams  - Recommend you stay up to date for cancer screening: mammograms/PAP for women and prostate cancer screening for men, and colon cancer screening for all.  - Practice good hygiene with washing of hands and maintaining a clean living space as this will decrease your chances of developing an infection in the post-transplantation setting  - Eat a health diet: rich in fresh fruits and vegetables, lean proteins, and minimize carbohydrate and fat intake.    Follow up: 6 months    Thomas M. Leventhal, M.D.   of Medicine  Advanced & Transplant Hepatology  [facility]

## 2021-02-04 NOTE — NURSING NOTE
"Chief Complaint   Patient presents with     RECHECK     Follow up TX     Vital signs:  Temp: 98.2  F (36.8  C)   BP: 109/78 Pulse: 98     SpO2: 95 %       Weight: 93.2 kg (205 lb 8 oz)  Estimated body mass index is 37.59 kg/m  as calculated from the following:    Height as of 1/13/21: 1.575 m (5' 2\").    Weight as of this encounter: 93.2 kg (205 lb 8 oz).        Deisy Mathias, [facility]    "

## 2021-02-08 ENCOUNTER — PREP FOR PROCEDURE (OUTPATIENT)
Dept: GASTROENTEROLOGY | Facility: CLINIC | Age: 61
End: 2021-02-08

## 2021-02-08 ENCOUNTER — PATIENT OUTREACH (OUTPATIENT)
Dept: GASTROENTEROLOGY | Facility: CLINIC | Age: 61
End: 2021-02-08

## 2021-02-08 DIAGNOSIS — Z94.4 S/P LIVER TRANSPLANT (H): Primary | ICD-10-CM

## 2021-02-08 NOTE — TELEPHONE ENCOUNTER
Per Dr. Canada  Please assist in scheduling:     Procedure/Imaging/Clinic: ERCP  Physician: Dr. Canada  Timin/24  Procedure length:90 min  Anesthesia:general  Dx: hx of liver transplant  Tier:2  Location: [facility]       Called to discuss with patient. Explained they can expect a call for date and time for procedure, will need a , someone to stay with them for 24 hours and should stay in town for 24 hours (within 45 min of Hospital) post procedure    Patient needs to get pre-op physical completed and will fax a copy to us along with bringing a hard copy with them. Fax number given. [phone] *If you do not get a preop physical, your procedure could be cancelled, patient voiced understanding*    Preop Plan:PCP will do, pcp verified Wale Thurston      Med Review    Blood thinner -  no  ASA - no  Diabetic - no    COVID test discussed:will get done locally, knows to get done no more than 4 days prior    Patient Education r/t procedure:done    Does patient have any history of gastric bypass/gastric surgery/altered panc/bili anatomy?liver transplant    A pre-op nurse will call 1-2 days prior to the procedure. Is advised to be NPO/no solid food 8 hours before the procedure. Ok to drink clear liquids (Water, Apple Juice or Gatorade) up to 2 hours prior to procedure.     Other specific details/comments:none     Verbalized understanding of all instructions. All questions answered.

## 2021-02-09 ENCOUNTER — TELEPHONE (OUTPATIENT)
Dept: ORTHOPEDICS | Facility: CLINIC | Age: 61
End: 2021-02-09

## 2021-02-09 ENCOUNTER — HOSPITAL ENCOUNTER (OUTPATIENT)
Dept: PHYSICAL THERAPY | Facility: CLINIC | Age: 61
Setting detail: THERAPIES SERIES
End: 2021-02-09
Attending: ORTHOPAEDIC SURGERY
Payer: COMMERCIAL

## 2021-02-09 ENCOUNTER — PATIENT OUTREACH (OUTPATIENT)
Dept: GASTROENTEROLOGY | Facility: CLINIC | Age: 61
End: 2021-02-09

## 2021-02-09 ENCOUNTER — TELEPHONE (OUTPATIENT)
Dept: TRANSPLANT | Facility: CLINIC | Age: 61
End: 2021-02-09

## 2021-02-09 PROCEDURE — 97112 NEUROMUSCULAR REEDUCATION: CPT | Mod: GP | Performed by: PHYSICAL MEDICINE & REHABILITATION

## 2021-02-09 PROCEDURE — 97110 THERAPEUTIC EXERCISES: CPT | Mod: GP | Performed by: PHYSICAL MEDICINE & REHABILITATION

## 2021-02-09 NOTE — TELEPHONE ENCOUNTER
Patient Call: General  Route to LPN    Reason for call: pt is scheduling for ERCP and has few question regarding the prep     Call back needed? Yes    Return Call Needed  Same as documented in contacts section  When to return call?: Greater than one day: Route standard priority

## 2021-02-09 NOTE — TELEPHONE ENCOUNTER
RN called and spoke with patient.  Told patient to reach to Dr. Rodrigo Kendrick's new RN.  Patient expressed understanding.    Colten Avila RN

## 2021-02-09 NOTE — TELEPHONE ENCOUNTER
Received call from pt wanting to speak to Dr. Wallace's RN, Shala. ATC informed pt that Shala is not here today but ATC can take a message for her. Pt stated that she has a dental apt in March and her PCP had already prescribed her antibiotics. She's going to have a ERC procedure on 2/24 and was wondering if she needs antibiotic for the procedure. ATC informed pt that ATC will check with Dr. Wallace's team. She asked Rafal to addend his note. She stated that Rafal wrote 115 degrees knee flexion and she's only at 104 degrees at her highest. Pt also asked about knee manipulation. ATC informed pt that ATC will check with Dr. Wallace's team. Pt would like a call to pt's home phone.            -Yaya, ATC- Orthopedics

## 2021-02-09 NOTE — TELEPHONE ENCOUNTER
This RN reached out to Nicci to assess her needs.  This RN introduced myself as the new RN Coordinator for Dr. Wallace.    Nicci shared her history with me. Nicci confirmed that while she has had setbacks, and her knee is still stiff and painful, she is making progress. In therapy today, her flexion was 102 degrees.     Nicci just wanted to confirm that she no longer needs to keep joint manipulation on her radar. Nicci wants confirmation from Dr. Wallace that since she is at 102 degrees, joint manipulation is no longer necessary.    Nicci would like this RN to either call her back or send her a my chart message on Thursday after speaking with Dr. Wallace.    Nicci also wanted to confirm antibiotic therapy prior to her ERCP. This RN confirmed with her that an endoscopy or colonoscopy also requires antibiotic therapy. This RN stressed that Nicci should discuss with the hospital team regarding NPO and restrictions and if needed, the antibiotic therapy could be given intravenous prior to the procedure. Nicci appreciated this info and will discuss with the hospital.    Nicci does have a dental procedure in a few weeks, however her primary MD had already prescribed oral antibiotics. Nicci confirmed the prescription was Amoxicillin 2000 mg, 4 tablets of 500 mg to be taken one hr prior to the dental procedure.

## 2021-02-10 DIAGNOSIS — Z11.59 ENCOUNTER FOR SCREENING FOR OTHER VIRAL DISEASES: ICD-10-CM

## 2021-02-11 ENCOUNTER — HOSPITAL ENCOUNTER (OUTPATIENT)
Dept: PHYSICAL THERAPY | Facility: CLINIC | Age: 61
Setting detail: THERAPIES SERIES
End: 2021-02-11
Attending: ORTHOPAEDIC SURGERY
Payer: COMMERCIAL

## 2021-02-11 ENCOUNTER — PATIENT OUTREACH (OUTPATIENT)
Dept: GASTROENTEROLOGY | Facility: CLINIC | Age: 61
End: 2021-02-11

## 2021-02-11 PROCEDURE — 97110 THERAPEUTIC EXERCISES: CPT | Mod: GP | Performed by: PHYSICAL MEDICINE & REHABILITATION

## 2021-02-11 PROCEDURE — 97112 NEUROMUSCULAR REEDUCATION: CPT | Mod: GP | Performed by: PHYSICAL MEDICINE & REHABILITATION

## 2021-02-11 NOTE — TELEPHONE ENCOUNTER
Returned call to pt to discuss antibiotics pre procedure. Per Dr. Canada  She can skip on the day of procedure as she will receive Iv     Left message.    ML

## 2021-02-18 ENCOUNTER — HOSPITAL ENCOUNTER (OUTPATIENT)
Dept: PHYSICAL THERAPY | Facility: CLINIC | Age: 61
Setting detail: THERAPIES SERIES
End: 2021-02-18
Attending: ORTHOPAEDIC SURGERY
Payer: COMMERCIAL

## 2021-02-18 PROCEDURE — 97110 THERAPEUTIC EXERCISES: CPT | Mod: GP | Performed by: PHYSICAL MEDICINE & REHABILITATION

## 2021-02-23 ENCOUNTER — HOSPITAL ENCOUNTER (OUTPATIENT)
Dept: PHYSICAL THERAPY | Facility: CLINIC | Age: 61
Setting detail: THERAPIES SERIES
End: 2021-02-23
Attending: ORTHOPAEDIC SURGERY
Payer: COMMERCIAL

## 2021-02-23 PROCEDURE — 97110 THERAPEUTIC EXERCISES: CPT | Mod: GP | Performed by: PHYSICAL MEDICINE & REHABILITATION

## 2021-02-24 ENCOUNTER — ANESTHESIA EVENT (OUTPATIENT)
Dept: SURGERY | Facility: CLINIC | Age: 61
End: 2021-02-24
Payer: COMMERCIAL

## 2021-02-24 ENCOUNTER — APPOINTMENT (OUTPATIENT)
Dept: GENERAL RADIOLOGY | Facility: CLINIC | Age: 61
End: 2021-02-24
Attending: INTERNAL MEDICINE
Payer: COMMERCIAL

## 2021-02-24 ENCOUNTER — HOSPITAL ENCOUNTER (OUTPATIENT)
Facility: CLINIC | Age: 61
Discharge: HOME | End: 2021-02-24
Attending: INTERNAL MEDICINE | Admitting: INTERNAL MEDICINE
Payer: COMMERCIAL

## 2021-02-24 ENCOUNTER — ANESTHESIA (OUTPATIENT)
Dept: SURGERY | Facility: CLINIC | Age: 61
End: 2021-02-24
Payer: COMMERCIAL

## 2021-02-24 VITALS
HEIGHT: 62 IN | BODY MASS INDEX: 38.62 KG/M2 | OXYGEN SATURATION: 98 % | TEMPERATURE: 98.2 F | HEART RATE: 60 BPM | SYSTOLIC BLOOD PRESSURE: 144 MMHG | DIASTOLIC BLOOD PRESSURE: 72 MMHG | RESPIRATION RATE: 18 BRPM | WEIGHT: 209.88 LBS

## 2021-02-24 DIAGNOSIS — Z94.4 S/P LIVER TRANSPLANT (H): ICD-10-CM

## 2021-02-24 LAB
ALBUMIN SERPL-MCNC: 3.6 G/DL (ref 3.4–5)
ALP SERPL-CCNC: 179 U/L (ref 40–150)
ALT SERPL W P-5'-P-CCNC: 77 U/L (ref 0–50)
AMYLASE SERPL-CCNC: 42 U/L (ref 30–110)
ANION GAP SERPL CALCULATED.3IONS-SCNC: 6 MMOL/L (ref 3–14)
AST SERPL W P-5'-P-CCNC: 74 U/L (ref 0–45)
BILIRUB SERPL-MCNC: 1.6 MG/DL (ref 0.2–1.3)
BUN SERPL-MCNC: 28 MG/DL (ref 7–30)
CALCIUM SERPL-MCNC: 9.6 MG/DL (ref 8.5–10.1)
CHLORIDE SERPL-SCNC: 108 MMOL/L (ref 94–109)
CO2 SERPL-SCNC: 23 MMOL/L (ref 20–32)
CREAT SERPL-MCNC: 1.01 MG/DL (ref 0.52–1.04)
ERCP: NORMAL
ERYTHROCYTE [DISTWIDTH] IN BLOOD BY AUTOMATED COUNT: 12.2 % (ref 10–15)
GFR SERPL CREATININE-BSD FRML MDRD: 60 ML/MIN/{1.73_M2}
GLUCOSE BLDC GLUCOMTR-MCNC: 82 MG/DL (ref 70–99)
GLUCOSE SERPL-MCNC: 97 MG/DL (ref 70–99)
HCT VFR BLD AUTO: 38.6 % (ref 35–47)
HGB BLD-MCNC: 12.9 G/DL (ref 11.7–15.7)
INR PPP: 1 (ref 0.86–1.14)
LIPASE SERPL-CCNC: 61 U/L (ref 73–393)
MCH RBC QN AUTO: 31.6 PG (ref 26.5–33)
MCHC RBC AUTO-ENTMCNC: 33.4 G/DL (ref 31.5–36.5)
MCV RBC AUTO: 95 FL (ref 78–100)
PLATELET # BLD AUTO: 143 10E9/L (ref 150–450)
POTASSIUM SERPL-SCNC: 4.2 MMOL/L (ref 3.4–5.3)
PROT SERPL-MCNC: 7.7 G/DL (ref 6.8–8.8)
RBC # BLD AUTO: 4.08 10E12/L (ref 3.8–5.2)
SODIUM SERPL-SCNC: 137 MMOL/L (ref 133–144)
WBC # BLD AUTO: 4.8 10E9/L (ref 4–11)

## 2021-02-24 PROCEDURE — 360N000083 HC SURGERY LEVEL 3 W/ FLUORO, PER MIN: Performed by: INTERNAL MEDICINE

## 2021-02-24 PROCEDURE — C1874 STENT, COATED/COV W/DEL SYS: HCPCS | Performed by: INTERNAL MEDICINE

## 2021-02-24 PROCEDURE — 272N000001 HC OR GENERAL SUPPLY STERILE: Performed by: INTERNAL MEDICINE

## 2021-02-24 PROCEDURE — 85610 PROTHROMBIN TIME: CPT | Performed by: STUDENT IN AN ORGANIZED HEALTH CARE EDUCATION/TRAINING PROGRAM

## 2021-02-24 PROCEDURE — 999N000141 HC STATISTIC PRE-PROCEDURE NURSING ASSESSMENT: Performed by: INTERNAL MEDICINE

## 2021-02-24 PROCEDURE — 83690 ASSAY OF LIPASE: CPT | Performed by: STUDENT IN AN ORGANIZED HEALTH CARE EDUCATION/TRAINING PROGRAM

## 2021-02-24 PROCEDURE — 250N000011 HC RX IP 250 OP 636: Performed by: ANESTHESIOLOGY

## 2021-02-24 PROCEDURE — 258N000003 HC RX IP 258 OP 636: Performed by: ANESTHESIOLOGY

## 2021-02-24 PROCEDURE — 250N000009 HC RX 250: Performed by: STUDENT IN AN ORGANIZED HEALTH CARE EDUCATION/TRAINING PROGRAM

## 2021-02-24 PROCEDURE — 710N000010 HC RECOVERY PHASE 1, LEVEL 2, PER MIN: Performed by: INTERNAL MEDICINE

## 2021-02-24 PROCEDURE — 250N000009 HC RX 250: Performed by: INTERNAL MEDICINE

## 2021-02-24 PROCEDURE — 250N000011 HC RX IP 250 OP 636: Performed by: NURSE ANESTHETIST, CERTIFIED REGISTERED

## 2021-02-24 PROCEDURE — 370N000017 HC ANESTHESIA TECHNICAL FEE, PER MIN: Performed by: INTERNAL MEDICINE

## 2021-02-24 PROCEDURE — 999N000010 HC STATISTIC ANES STAT CODE-CRNA PER MINUTE: Performed by: INTERNAL MEDICINE

## 2021-02-24 PROCEDURE — 250N000009 HC RX 250: Performed by: NURSE ANESTHETIST, CERTIFIED REGISTERED

## 2021-02-24 PROCEDURE — 82962 GLUCOSE BLOOD TEST: CPT

## 2021-02-24 PROCEDURE — C1769 GUIDE WIRE: HCPCS | Performed by: INTERNAL MEDICINE

## 2021-02-24 PROCEDURE — 255N000002 HC RX 255 OP 636: Performed by: INTERNAL MEDICINE

## 2021-02-24 PROCEDURE — 999N000179 XR SURGERY CARM FLUORO LESS THAN 5 MIN W STILLS: Mod: TC

## 2021-02-24 PROCEDURE — 36415 COLL VENOUS BLD VENIPUNCTURE: CPT | Performed by: STUDENT IN AN ORGANIZED HEALTH CARE EDUCATION/TRAINING PROGRAM

## 2021-02-24 PROCEDURE — C1726 CATH, BAL DIL, NON-VASCULAR: HCPCS | Performed by: INTERNAL MEDICINE

## 2021-02-24 PROCEDURE — 82150 ASSAY OF AMYLASE: CPT | Performed by: STUDENT IN AN ORGANIZED HEALTH CARE EDUCATION/TRAINING PROGRAM

## 2021-02-24 PROCEDURE — 250N000025 HC SEVOFLURANE, PER MIN: Performed by: INTERNAL MEDICINE

## 2021-02-24 PROCEDURE — 80053 COMPREHEN METABOLIC PANEL: CPT | Performed by: STUDENT IN AN ORGANIZED HEALTH CARE EDUCATION/TRAINING PROGRAM

## 2021-02-24 PROCEDURE — 710N000012 HC RECOVERY PHASE 2, PER MINUTE: Performed by: INTERNAL MEDICINE

## 2021-02-24 PROCEDURE — 85027 COMPLETE CBC AUTOMATED: CPT | Performed by: STUDENT IN AN ORGANIZED HEALTH CARE EDUCATION/TRAINING PROGRAM

## 2021-02-24 DEVICE — STENT BILIARY WALLFLEX COVERED 10X40MM M00570360
Type: IMPLANTABLE DEVICE | Site: BILE DUCT | Status: NON-FUNCTIONAL
Removed: 2021-05-12

## 2021-02-24 RX ORDER — NALOXONE HYDROCHLORIDE 0.4 MG/ML
0.2 INJECTION, SOLUTION INTRAMUSCULAR; INTRAVENOUS; SUBCUTANEOUS
Status: DISCONTINUED | OUTPATIENT
Start: 2021-02-24 | End: 2021-02-24

## 2021-02-24 RX ORDER — ONDANSETRON 4 MG/1
4 TABLET, ORALLY DISINTEGRATING ORAL EVERY 30 MIN PRN
Status: DISCONTINUED | OUTPATIENT
Start: 2021-02-24 | End: 2021-02-24 | Stop reason: HOSPADM

## 2021-02-24 RX ORDER — INDOMETHACIN 50 MG/1
100 SUPPOSITORY RECTAL
Status: COMPLETED | OUTPATIENT
Start: 2021-02-24 | End: 2021-02-24

## 2021-02-24 RX ORDER — SODIUM CHLORIDE, SODIUM LACTATE, POTASSIUM CHLORIDE, CALCIUM CHLORIDE 600; 310; 30; 20 MG/100ML; MG/100ML; MG/100ML; MG/100ML
INJECTION, SOLUTION INTRAVENOUS CONTINUOUS
Status: DISCONTINUED | OUTPATIENT
Start: 2021-02-24 | End: 2021-02-24 | Stop reason: HOSPADM

## 2021-02-24 RX ORDER — IOPAMIDOL 510 MG/ML
INJECTION, SOLUTION INTRAVASCULAR PRN
Status: DISCONTINUED | OUTPATIENT
Start: 2021-02-24 | End: 2021-02-24 | Stop reason: HOSPADM

## 2021-02-24 RX ORDER — GLYCOPYRROLATE 0.2 MG/ML
INJECTION, SOLUTION INTRAMUSCULAR; INTRAVENOUS PRN
Status: DISCONTINUED | OUTPATIENT
Start: 2021-02-24 | End: 2021-02-24

## 2021-02-24 RX ORDER — NALOXONE HYDROCHLORIDE 0.4 MG/ML
0.2 INJECTION, SOLUTION INTRAMUSCULAR; INTRAVENOUS; SUBCUTANEOUS
Status: DISCONTINUED | OUTPATIENT
Start: 2021-02-24 | End: 2021-02-24 | Stop reason: HOSPADM

## 2021-02-24 RX ORDER — LEVOFLOXACIN 5 MG/ML
INJECTION, SOLUTION INTRAVENOUS PRN
Status: DISCONTINUED | OUTPATIENT
Start: 2021-02-24 | End: 2021-02-24

## 2021-02-24 RX ORDER — NALOXONE HYDROCHLORIDE 0.4 MG/ML
0.4 INJECTION, SOLUTION INTRAMUSCULAR; INTRAVENOUS; SUBCUTANEOUS
Status: DISCONTINUED | OUTPATIENT
Start: 2021-02-24 | End: 2021-02-24 | Stop reason: HOSPADM

## 2021-02-24 RX ORDER — FENTANYL CITRATE 50 UG/ML
INJECTION, SOLUTION INTRAMUSCULAR; INTRAVENOUS PRN
Status: DISCONTINUED | OUTPATIENT
Start: 2021-02-24 | End: 2021-02-24

## 2021-02-24 RX ORDER — KETAMINE HYDROCHLORIDE 10 MG/ML
INJECTION INTRAMUSCULAR; INTRAVENOUS PRN
Status: DISCONTINUED | OUTPATIENT
Start: 2021-02-24 | End: 2021-02-24

## 2021-02-24 RX ORDER — MEPERIDINE HYDROCHLORIDE 25 MG/ML
12.5 INJECTION INTRAMUSCULAR; INTRAVENOUS; SUBCUTANEOUS
Status: DISCONTINUED | OUTPATIENT
Start: 2021-02-24 | End: 2021-02-24 | Stop reason: HOSPADM

## 2021-02-24 RX ORDER — HYDROMORPHONE HYDROCHLORIDE 1 MG/ML
.3-.5 INJECTION, SOLUTION INTRAMUSCULAR; INTRAVENOUS; SUBCUTANEOUS EVERY 10 MIN PRN
Status: DISCONTINUED | OUTPATIENT
Start: 2021-02-24 | End: 2021-02-24 | Stop reason: HOSPADM

## 2021-02-24 RX ORDER — LIDOCAINE 40 MG/G
CREAM TOPICAL
Status: DISCONTINUED | OUTPATIENT
Start: 2021-02-24 | End: 2021-02-24 | Stop reason: HOSPADM

## 2021-02-24 RX ORDER — ONDANSETRON 2 MG/ML
4 INJECTION INTRAMUSCULAR; INTRAVENOUS EVERY 30 MIN PRN
Status: DISCONTINUED | OUTPATIENT
Start: 2021-02-24 | End: 2021-02-24 | Stop reason: HOSPADM

## 2021-02-24 RX ORDER — NALOXONE HYDROCHLORIDE 0.4 MG/ML
0.4 INJECTION, SOLUTION INTRAMUSCULAR; INTRAVENOUS; SUBCUTANEOUS
Status: DISCONTINUED | OUTPATIENT
Start: 2021-02-24 | End: 2021-02-24

## 2021-02-24 RX ORDER — FENTANYL CITRATE 50 UG/ML
25-50 INJECTION, SOLUTION INTRAMUSCULAR; INTRAVENOUS EVERY 5 MIN PRN
Status: DISCONTINUED | OUTPATIENT
Start: 2021-02-24 | End: 2021-02-24 | Stop reason: HOSPADM

## 2021-02-24 RX ORDER — PROPOFOL 10 MG/ML
INJECTION, EMULSION INTRAVENOUS PRN
Status: DISCONTINUED | OUTPATIENT
Start: 2021-02-24 | End: 2021-02-24

## 2021-02-24 RX ORDER — ONDANSETRON 2 MG/ML
INJECTION INTRAMUSCULAR; INTRAVENOUS PRN
Status: DISCONTINUED | OUTPATIENT
Start: 2021-02-24 | End: 2021-02-24

## 2021-02-24 RX ORDER — LIDOCAINE HYDROCHLORIDE 20 MG/ML
INJECTION, SOLUTION INFILTRATION; PERINEURAL PRN
Status: DISCONTINUED | OUTPATIENT
Start: 2021-02-24 | End: 2021-02-24

## 2021-02-24 RX ORDER — DEXAMETHASONE SODIUM PHOSPHATE 4 MG/ML
INJECTION, SOLUTION INTRA-ARTICULAR; INTRALESIONAL; INTRAMUSCULAR; INTRAVENOUS; SOFT TISSUE PRN
Status: DISCONTINUED | OUTPATIENT
Start: 2021-02-24 | End: 2021-02-24

## 2021-02-24 RX ORDER — FLUMAZENIL 0.1 MG/ML
0.2 INJECTION, SOLUTION INTRAVENOUS
Status: DISCONTINUED | OUTPATIENT
Start: 2021-02-24 | End: 2021-02-24 | Stop reason: HOSPADM

## 2021-02-24 RX ADMIN — FENTANYL CITRATE 50 MCG: 50 INJECTION, SOLUTION INTRAMUSCULAR; INTRAVENOUS at 08:05

## 2021-02-24 RX ADMIN — ROCURONIUM BROMIDE 40 MG: 10 INJECTION INTRAVENOUS at 07:41

## 2021-02-24 RX ADMIN — SODIUM CHLORIDE, POTASSIUM CHLORIDE, SODIUM LACTATE AND CALCIUM CHLORIDE: 600; 310; 30; 20 INJECTION, SOLUTION INTRAVENOUS at 07:33

## 2021-02-24 RX ADMIN — GLYCOPYRROLATE 0.1 MG: 0.2 INJECTION, SOLUTION INTRAMUSCULAR; INTRAVENOUS at 07:51

## 2021-02-24 RX ADMIN — ONDANSETRON 4 MG: 2 INJECTION INTRAMUSCULAR; INTRAVENOUS at 08:53

## 2021-02-24 RX ADMIN — SUGAMMADEX 200 MG: 100 INJECTION, SOLUTION INTRAVENOUS at 08:23

## 2021-02-24 RX ADMIN — ONDANSETRON 4 MG: 2 INJECTION INTRAMUSCULAR; INTRAVENOUS at 08:23

## 2021-02-24 RX ADMIN — DEXAMETHASONE SODIUM PHOSPHATE 10 MG: 4 INJECTION, SOLUTION INTRA-ARTICULAR; INTRALESIONAL; INTRAMUSCULAR; INTRAVENOUS; SOFT TISSUE at 08:07

## 2021-02-24 RX ADMIN — Medication 30 MG: at 07:41

## 2021-02-24 RX ADMIN — LIDOCAINE HYDROCHLORIDE 100 MG: 20 INJECTION, SOLUTION INFILTRATION; PERINEURAL at 07:41

## 2021-02-24 RX ADMIN — PROPOFOL 50 MG: 10 INJECTION, EMULSION INTRAVENOUS at 07:41

## 2021-02-24 RX ADMIN — LEVOFLOXACIN 500 MG: 5 INJECTION, SOLUTION INTRAVENOUS at 07:51

## 2021-02-24 RX ADMIN — MIDAZOLAM 2 MG: 1 INJECTION INTRAMUSCULAR; INTRAVENOUS at 07:35

## 2021-02-24 RX ADMIN — HYDROMORPHONE HYDROCHLORIDE 0.3 MG: 1 INJECTION, SOLUTION INTRAMUSCULAR; INTRAVENOUS; SUBCUTANEOUS at 08:54

## 2021-02-24 ASSESSMENT — MIFFLIN-ST. JEOR: SCORE: 1475.25

## 2021-02-24 NOTE — ANESTHESIA POSTPROCEDURE EVALUATION
Patient: Nicci Pemberton    Procedure(s):  ENDOSCOPIC RETROGRADE CHOLANGIOPANCREATOGRAPHY, SPINCTEROTOMY, DEBRIDE REMOVAL, STENT PLACEMENT    Diagnosis:S/P liver transplant (H) [Z94.4]  Diagnosis Additional Information: No value filed.    Anesthesia Type:  General    Note:  Disposition: Outpatient   Postop Pain Control: Uneventful            Sign Out: Well controlled pain   PONV: No   Neuro/Psych: Uneventful            Sign Out: Acceptable/Baseline neuro status   Airway/Respiratory: Uneventful            Sign Out: Acceptable/Baseline resp. status   CV/Hemodynamics: Uneventful            Sign Out: Acceptable CV status   Other NRE: NONE   DID A NON-ROUTINE EVENT OCCUR? No         Last vitals:  Vitals:    02/24/21 0543   BP: 116/75   Resp: 16   Temp: 36.8  C (98.3  F)   SpO2: 96%       Last vitals prior to Anesthesia Care Transfer:  CRNA VITALS  2/24/2021 0800 - 2/24/2021 0846      2/24/2021             SpO2:  100 %    EKG:  Sinus rhythm          Electronically Signed By: Park Dudley MD  February 24, 2021  8:46 AM

## 2021-02-24 NOTE — ANESTHESIA PREPROCEDURE EVALUATION
Anesthesia Pre-Procedure Evaluation    Patient: Nicci Pemberton   MRN:     [id number] Gender:   female   Age:    60 year old :      1960        Preoperative Diagnosis: Chronic pain of both knees [M25.561, M25.562, G89.29]   Procedure(s):  Right  total knee arthroplasty     LABS:  CBC:   Lab Results   Component Value Date    WBC 4.8 2021    WBC 4.7 2021    HGB 12.9 2021    HGB 12.1 2021    HCT 38.6 2021    HCT 36.5 2021     (L) 2021     (L) 2021     BMP:   Lab Results   Component Value Date     2021     2021    POTASSIUM 4.2 2021    POTASSIUM 4.4 2021    CHLORIDE 108 2021    CHLORIDE 106 2021    CO2 23 2021    CO2 28 2021    BUN 28 2021    BUN 29 2021    CR 1.01 2021    CR 1.11 (H) 2021    GLC 97 2021    GLC 91 2021     COAGS:   Lab Results   Component Value Date    PTT 36 2020    INR 1.00 2021    FIBR 682 (H) 12/10/2020     POC:   Lab Results   Component Value Date    BGM 82 2021    HCGS Negative 2018     OTHER:   Lab Results   Component Value Date    PH 7.46 (H) 2019    LACT 0.8 10/28/2020    A1C 5.0 2019    JACOB 9.6 2021    PHOS 3.8 2020    MAG 1.8 2020    ALBUMIN 3.6 2021    PROTTOTAL 7.7 2021    ALT 77 (H) 2021    AST 74 (H) 2021    ALKPHOS 179 (H) 2021    BILITOTAL 1.6 (H) 2021    LIPASE 61 (L) 2021    AMYLASE 42 2021    DOROTHEA 23 2019    TSH 0.88 2021    T4 0.70 (L) 2019    CRP 51.6 (H) 12/10/2020     (H) 10/28/2020        Preop Vitals    BP Readings from Last 3 Encounters:   21 116/75   21 109/78   20 122/79    Pulse Readings from Last 3 Encounters:   21 98   20 64   10/31/20 66      Resp Readings from Last 3 Encounters:   21 16   20 16   10/31/20 16    SpO2 Readings from Last 3  "Encounters:   02/24/21 96%   02/04/21 95%   12/11/20 99%      Temp Readings from Last 1 Encounters:   02/24/21 36.8  C (98.3  F) (Oral)    Ht Readings from Last 1 Encounters:   02/24/21 1.575 m (5' 2\")      Wt Readings from Last 1 Encounters:   02/24/21 95.2 kg (209 lb 14.1 oz)    Estimated body mass index is 38.39 kg/m  as calculated from the following:    Height as of an earlier encounter on 2/24/21: 1.575 m (5' 2\").    Weight as of an earlier encounter on 2/24/21: 95.2 kg (209 lb 14.1 oz).     LDA:        Past Medical History:   Diagnosis Date     Anemia      Cellulitis      Cirrhosis of liver (H) 06/18/2018     Gastroesophageal reflux disease      Heterozygous alpha 1-antitrypsin deficiency (H)      High hepatic iron concentration determined by biopsy of liver      Liver cirrhosis secondary to ANGULO (H)      Liver transplanted (H) 08/18/2019     Lymphedema      Osteoarthritis     knees     SBP (spontaneous bacterial peritonitis) (H) 08/02/2019 8/1/19 in [facility]     Thrombocytopenia (H) 11/2020     Thyroid disease     Hypothyroid      Past Surgical History:   Procedure Laterality Date     ARTHROPLASTY KNEE Right 10/23/2020    Procedure: Right  total knee arthroplasty;  Surgeon: Mario Wallace MD;  [facility]     BENCH LIVER N/A 8/18/2019    Procedure: BACKBENCH PREPARATION, LIVER;  Surgeon: Mandeep Alford MD;  [facility]     COLONOSCOPY N/A 6/22/2018    Procedure: COLONOSCOPY;  colonoscopy;  Surgeon: Hugo Patel MD;  [facility]     ENDOSCOPIC RETROGRADE CHOLANGIOPANCREATOGRAM N/A 2/10/2020    Procedure: ENDOSCOPIC RETROGRADE CHOLANGIOPANCREATOGRAPHY with spinchterotomy;  Surgeon: Guru Rigoberto Canada MD;  [facility]     ENDOSCOPIC RETROGRADE CHOLANGIOPANCREATOGRAM N/A 5/13/2020    Procedure: ENDOSCOPIC RETROGRADE CHOLANGIOPANCREATOGRAPHY,Stent exchange and sludge removal;  Surgeon: Guru Rigoberto Canada MD;  [facility]     ENDOSCOPIC RETROGRADE " CHOLANGIOPANCREATOGRAPHY, EXCHANGE TUBE/STENT N/A 3/4/2020    Procedure: ENDOSCOPIC RETROGRADE CHOLANGIOPANCREATOGRAPHY, WITH biliary dilarion, stent exchange, stone removal;  Surgeon: Guru Rigoberto Canada MD;  [facility]     ESOPHAGOSCOPY, GASTROSCOPY, DUODENOSCOPY (EGD), COMBINED N/A 10/31/2019    Procedure: Esophagogastroduodenoscopy, With Biopsy;  Surgeon: Nerissa Aranda MD;  [facility]     IR FEEDING TUBE PLACEMENT W MOHAN/MD  2019     IR FLUORO 0-1 HOUR  2019     IR LUMBAR PUNCTURE  2019     KNEE SURGERY  10/23/20     TRANSPLANT LIVER RECIPIENT  DONOR N/A 2019    Procedure: TRANSPLANT, LIVER, RECIPIENT,  DONOR;  Surgeon: Mandeep Alford MD;  [facility]      Allergies   Allergen Reactions     Ciprofloxacin Dizziness and Nausea     Food      Fruits with cores and pits  Apples     Sulfa Drugs Other (See Comments)     Swollen joints     Furosemide Rash        Anesthesia Evaluation     . Pt has had prior anesthetic. Type: General and MAC.           ROS/MED HX    ENT/Pulmonary:  - neg pulmonary ROS     Neurologic: Comment: Vertigo    (+) - Peripheral neuropathy.     Cardiovascular:     (+) hypertension-----      METS/Exercise Tolerance:     Hematologic: Comments: Immunosuppressed status (H)        Musculoskeletal: Comment: Chronic pain of both knees   (+) arthritis (Osteoarthritis),       GI/Hepatic:     (+) GERD, Asymptomatic on medication, hepatitis liver disease (Hx of ANGULO and Heterozygous alpha 1 antitrypsin deficiency s/p Liver Transplant 19.),       Renal/Genitourinary:     (+) renal disease, type: CRI, Pt does not require dialysis,    (-) History of transplant   Endo: Comment: Steroid-induced hyperglycemia    (+) thyroid problem, hypothyroidism, Obesity,       Psychiatric:  - neg psychiatric ROS       Infectious Disease:  - neg infectious disease ROS       Malignancy:       Other: Comment: Acquired lymphedema of  leg  Venous hypertension of lower extremity, bilateral  Cramp in lower extremity associated with sleep      Heterozygous alpha 1-antitrypsin deficiency (H)    Vitamin D deficiency  Zinc deficiency       (+) , H/O Chronic Pain,                       PHYSICAL EXAM:   Mental Status/Neuro:    Airway: Facies: Feasible  Mallampati: II  Mouth/Opening: Full  TM distance: > 6 cm   Respiratory: Auscultation: CTAB     Resp. Rate: Normal     Resp. Effort: Normal      CV: Rhythm: Regular  Heart: Murmur (Diastolic; soft)  Edema: RLE; LLE   Comments:                    Anesthesia Pre-Procedure Evaluation    Patient: Nicci Pemberton   MRN:     [id number] Gender:   female   Age:    59 year old :      1960        Preoperative Diagnosis: Biliary stricture [K83.1]   Procedure(s):  ENDOSCOPIC RETROGRADE CHOLANGIOPANCREATOGRAPHY     LABS:  CBC:   Lab Results   Component Value Date    WBC 4.8 2021    WBC 4.7 2021    HGB 12.9 2021    HGB 12.1 2021    HCT 38.6 2021    HCT 36.5 2021     (L) 2021     (L) 2021     BMP:   Lab Results   Component Value Date     2021     2021    POTASSIUM 4.2 2021    POTASSIUM 4.4 2021    CHLORIDE 108 2021    CHLORIDE 106 2021    CO2 23 2021    CO2 28 2021    BUN 28 2021    BUN 29 2021    CR 1.01 2021    CR 1.11 (H) 2021    GLC 97 2021    GLC 91 2021     COAGS:   Lab Results   Component Value Date    PTT 36 2020    INR 1.00 2021    FIBR 682 (H) 12/10/2020     POC:   Lab Results   Component Value Date    BGM 82 2021    HCGS Negative 2018     OTHER:   Lab Results   Component Value Date    PH 7.46 (H) 2019    LACT 0.8 10/28/2020    A1C 5.0 2019    JACOB 9.6 2021    PHOS 3.8 2020    MAG 1.8 2020    ALBUMIN 3.6 2021    PROTTOTAL 7.7 2021    ALT 77 (H) 2021    AST 74 (H) 2021     "ALKPHOS 179 (H) 02/24/2021    BILITOTAL 1.6 (H) 02/24/2021    LIPASE 61 (L) 02/24/2021    AMYLASE 42 02/24/2021    DOROTHEA 23 09/04/2019    TSH 0.88 01/26/2021    T4 0.70 (L) 09/11/2019    CRP 51.6 (H) 12/10/2020     (H) 10/28/2020        Preop Vitals    BP Readings from Last 3 Encounters:   02/24/21 116/75   02/04/21 109/78   12/11/20 122/79    Pulse Readings from Last 3 Encounters:   02/04/21 98   12/11/20 64   10/31/20 66      Resp Readings from Last 3 Encounters:   02/24/21 16   12/11/20 16   10/31/20 16    SpO2 Readings from Last 3 Encounters:   02/24/21 96%   02/04/21 95%   12/11/20 99%      Temp Readings from Last 1 Encounters:   02/24/21 36.8  C (98.3  F) (Oral)    Ht Readings from Last 1 Encounters:   02/24/21 1.575 m (5' 2\")      Wt Readings from Last 1 Encounters:   02/24/21 95.2 kg (209 lb 14.1 oz)    Estimated body mass index is 38.39 kg/m  as calculated from the following:    Height as of an earlier encounter on 2/24/21: 1.575 m (5' 2\").    Weight as of an earlier encounter on 2/24/21: 95.2 kg (209 lb 14.1 oz).     LDA:        Past Medical History:   Diagnosis Date     Anemia      Cellulitis      Cirrhosis of liver (H) 06/18/2018     Gastroesophageal reflux disease      Heterozygous alpha 1-antitrypsin deficiency (H)      High hepatic iron concentration determined by biopsy of liver      Liver cirrhosis secondary to ANGULO (H)      Liver transplanted (H) 08/18/2019     Lymphedema      Osteoarthritis     knees     SBP (spontaneous bacterial peritonitis) (H) 08/02/2019 8/1/19 in [facility]     Thrombocytopenia (H) 11/2020     Thyroid disease     Hypothyroid      Past Surgical History:   Procedure Laterality Date     ARTHROPLASTY KNEE Right 10/23/2020    Procedure: Right  total knee arthroplasty;  Surgeon: Mario Wallace MD;  Location:  OR     BENCH LIVER N/A 8/18/2019    Procedure: BACKBENCH PREPARATION, LIVER;  Surgeon: Mandeep Alford MD;  [facility]     COLONOSCOPY N/A 6/22/2018    " Procedure: COLONOSCOPY;  colonoscopy;  Surgeon: Hugo Patel MD;  [facility]     ENDOSCOPIC RETROGRADE CHOLANGIOPANCREATOGRAM N/A 2/10/2020    Procedure: ENDOSCOPIC RETROGRADE CHOLANGIOPANCREATOGRAPHY with spinchterotomy;  Surgeon: Guru Rigoberto Canada MD;  [facility]     ENDOSCOPIC RETROGRADE CHOLANGIOPANCREATOGRAM N/A 2020    Procedure: ENDOSCOPIC RETROGRADE CHOLANGIOPANCREATOGRAPHY,Stent exchange and sludge removal;  Surgeon: Guru Rigoberto Canada MD;  [facility]     ENDOSCOPIC RETROGRADE CHOLANGIOPANCREATOGRAPHY, EXCHANGE TUBE/STENT N/A 3/4/2020    Procedure: ENDOSCOPIC RETROGRADE CHOLANGIOPANCREATOGRAPHY, WITH biliary dilarion, stent exchange, stone removal;  Surgeon: Guru Rigoberto Canada MD;  [facility]     ESOPHAGOSCOPY, GASTROSCOPY, DUODENOSCOPY (EGD), COMBINED N/A 10/31/2019    Procedure: Esophagogastroduodenoscopy, With Biopsy;  Surgeon: Nerissa Aranda MD;  [facility]     IR FEEDING TUBE PLACEMENT W MOHAN/MD  2019     IR FLUORO 0-1 HOUR  2019     IR LUMBAR PUNCTURE  2019     KNEE SURGERY  10/23/20     TRANSPLANT LIVER RECIPIENT  DONOR N/A 2019    Procedure: TRANSPLANT, LIVER, RECIPIENT,  DONOR;  Surgeon: Mandeep Alford MD;  [facility]      Allergies   Allergen Reactions     Ciprofloxacin Dizziness and Nausea     Food      Fruits with cores and pits  Apples     Sulfa Drugs Other (See Comments)     Swollen joints     Furosemide Rash           Anesthesia Evaluation     . Pt has had prior anesthetic. Type: General (MAC 4, 7 ETT)               ROS/MED HX     ENT/Pulmonary:       Neurologic:       Cardiovascular:     (+) ----. : . . . :. . Previous cardiac testing date:results:Stress Testdate:19 results:dobutamine stress echocardiogram test negative for ischemia at diagnostic level of peak stress (87% MPHR).ECG reviewed date:10/4/19 results:SR, ISABELLA, NSSTT changes  "date: results:           METS/Exercise Tolerance:     Hematologic:     (+) Anemia, -       Musculoskeletal:   (+) arthritis,  -        GI/Hepatic: Comment: Hx of ANGULO and Heterozygous alpha 1 antitrypsin deficiency s/p Liver Transplant 8/18/19.  Elevated LFTs  Per imaging report \"common bile duct measures 8 mm with nonspecific echogenic intraluminal content\"     (+) GERD        Renal/Genitourinary:     (+) chronic renal disease,        Endo:          Psychiatric:          Infectious Disease:          Malignancy:          Other:                      PHYSICAL EXAM:   Mental Status/Neuro: A/A/O   Airway: Facies: Feasible  Mallampati: II  Mouth/Opening: Full  TM distance: > 6 cm  Neck ROM: Full   Respiratory: Auscultation: CTAB     Resp. Rate: Normal     Resp. Effort: Normal      CV: Rhythm: Regular  Rate: Age appropriate  Heart: Normal Sounds  Edema: None   Comments:      Dental: Normal Dentition                Assessment:   ASA SCORE: 2    H&P: History and physical reviewed and following examination; no interval change.   Smoking Status:  Non-Smoker/Unknown   NPO Status: NPO Appropriate     Plan:   Anes. Type:  General   Pre-Medication: None   Induction:  IV (Standard)   Airway: ETT; Oral   Access/Monitoring: PIV   Maintenance: Balanced     Postop Plan:   Postop Pain: Opioids  Postop Sedation/Airway: Not planned  Disposition: Outpatient     PONV Management:   Adult Risk Factors: Female, Non-Smoker, Postop Opioids   Prevention: Ondansetron, Dexamethasone     CONSENT: Direct conversation   Plan and risks discussed with: Patient   Blood Products: Consent Deferred (Minimal Blood Loss)                   Park Dudley MD    Assessment:   ASA SCORE: 3    H&P: History and physical reviewed and following examination; no interval change.         Plan:   Anes. Type:  General     Block Details: Single Shot; Adductor C.   Pre-Medication: None   Induction:  N/a   Airway: Native Airway   Access/Monitoring: PIV   Maintenance: " N/a     Postop Plan:   Postop Pain: Regional  Postop Sedation/Airway: Not planned     PONV Management: Adult Risk Factors: Female     CONSENT: Direct conversation   Plan and risks discussed with: Patient   Blood Products: Consented (ALL Blood Products)       Comments for Plan/Consent:  59 yo for Right  total knee arthroplasty (Right Knee) under Spinal/MAC/RA/GETA backup.  Anesthesia risks and benefits discussed. Questions answered. Patient understands and agrees to proceed with anesthesia plan.                    Park Dudley MD    Anesthesia Evaluation   Pt has had prior anesthetic. Type: General and MAC.        ROS/MED HX  ENT/Pulmonary:  - neg pulmonary ROS     Neurologic: Comment: Vertigo    (+) - Peripheral neuropathy.     Cardiovascular:     (+) hypertension-----    METS/Exercise Tolerance:     Hematologic: Comments: Immunosuppressed status (H)      Musculoskeletal: Comment: Chronic pain of both knees   (+) arthritis (Osteoarthritis),     GI/Hepatic:     (+) GERD, Asymptomatic on medication, hepatitis liver disease (Hx of ANGULO and Heterozygous alpha 1 antitrypsin deficiency s/p Liver Transplant 8/18/19.),     Renal/Genitourinary:     (+) renal disease, type: CRI, Pt does not require dialysis,  (-) History of transplant   Endo: Comment: Steroid-induced hyperglycemia    (+) thyroid problem, hypothyroidism, Obesity,     Psychiatric/Substance Use:  - neg psychiatric ROS     Infectious Disease:  - neg infectious disease ROS     Malignancy:  - neg malignancy ROS     Other: Comment: Acquired lymphedema of leg  Venous hypertension of lower extremity, bilateral  Cramp in lower extremity associated with sleep      Heterozygous alpha 1-antitrypsin deficiency (H)    Vitamin D deficiency  Zinc deficiency         (+) , H/O Chronic Pain,          Physical Exam    Airway        Mallampati: II   TM distance: > 3 FB   Neck ROM: full   Mouth opening: > 3 cm    Respiratory Devices and Support         Dental            Cardiovascular             Pulmonary                     Anesthesia Plan    ASA Status:  3      Anesthesia Type: General.   Induction: Intravenous, Propofol.   Maintenance: Balanced.   Techniques and Equipment:     - Airway: Video-Laryngoscope     - Lines/Monitors: 2nd IV     Consents    Anesthesia Plan(s) and associated risks, benefits, and realistic alternatives discussed. Questions answered and patient/representative(s) expressed understanding.     - Discussed with:  Patient      - Extended Intubation/Ventilatory Support Discussed: no Extended Intubation.      - Patient is DNR/DNI Status: No    Use of blood products discussed: No .     Postoperative Care    Pain management: IV analgesics, Multi-modal analgesia.   PONV prophylaxis: Ondansetron (or other 5HT-3), Dexamethasone or Solumedrol     Comments:       H&P review: History and physical reviewed and following examination; no interval change.

## 2021-02-24 NOTE — OR NURSING
Discharge instructions to pt and responsible adult.- spouse            All questions regarding discharge information answered.  Pt and responsible adult verbalized understanding of all discharge instruction information given.  Printed copy of AVS sent with pt upon discharge.

## 2021-02-24 NOTE — ANESTHESIA CARE TRANSFER NOTE
Patient: Nicci Pemberton    Procedure(s):  ENDOSCOPIC RETROGRADE CHOLANGIOPANCREATOGRAPHY, SPINCTEROTOMY, DEBRIDE REMOVAL, STENT PLACEMENT    Diagnosis: S/P liver transplant (H) [Z94.4]  Diagnosis Additional Information: No value filed.    Anesthesia Type:   General     Note:    Oropharynx: oral airway in place  Level of Consciousness: drowsy  Oxygen Supplementation: blow-by O2 and face mask  Level of Supplemental Oxygen (L/min / FiO2): 6  Independent Airway: airway patency satisfactory and stable    Vital Signs Stable: post-procedure vital signs reviewed and stable  Report to RN Given: handoff report given  Patient transferred to: PACU  Comments: VSS. Ventilating well.  Handoff Report: Identifed the Patient, Identified the Reponsible Provider, Reviewed the pertinent medical history, Discussed the surgical course, Reviewed Intra-OP anesthesia mangement and issues during anesthesia, Set expectations for post-procedure period and Allowed opportunity for questions and acknowledgement of understanding      Vitals: (Last set prior to Anesthesia Care Transfer)  CRNA VITALS  2/24/2021 0800 - 2/24/2021 0836      2/24/2021             SpO2:  100 %    EKG:  Sinus rhythm        Electronically Signed By: SANTINO Sainz CRNA  February 24, 2021  8:36 AM

## 2021-02-24 NOTE — DISCHARGE INSTRUCTIONS
[facility], [place]  Same-Day Surgery ERCP Procedure   Adult Discharge Orders & Instructions     You had a procedure known as an Endoscopic Retrograde Cholangiopancreatography (ERCP). Your healthcare provider performed the ERCP to look at your bile or pancreatic ducts, and to locate and/or treat blockages (dilation, stenting, removal, etc.) in these ducts. Often biopsies, small samples of tissue, are taken to help diagnose and/or classify stages of disease growth. This procedure is used to diagnose diseases of the pancreas, bile ducts, pancreatic duct, liver, and gallbladder.     After your procedure   1. Make sure to clarify with your healthcare provider any diet restrictions (For example, clear liquid, low fat, no caffeine, etc.)   2. Do NOT take aspirin containing medications or any other blood-thinning medicines (anticoagulants) until your healthcare provider says it's OK.   3. You MAY be prescribed antibiotics, depending on what was done and/or found during your EUS, make sure to take antibiotics as prescribed by your healthcare provider    For 24 hours after surgery  1. Get plenty of rest.  A responsible adult must stay with you for at least 24 hours after you leave the hospital.   2. Do not drive or use heavy equipment.  If you have weakness or tingling, don't drive or use heavy equipment until this feeling goes away.  3. Do not drink alcohol.  4. Avoid strenuous or risky activities (gym, yoga, cycling, etc.).  Ask for help when climbing stairs.   5. You may feel lightheaded.  IF so, sit for a few minutes before standing.  Have someone help you get up.   6. If you have nausea (feel sick to your stomach): Drink only clear liquids such as apple juice, ginger ale, broth or 7-Up.  Rest may also help.  Be sure to drink enough fluids.  Move to a regular diet as you feel able.  7. If you feel bloated or have too much gas, use a heating pad on your belly to help reduce the discomfort.  This should help you feel better.   8. You may have a slight fever. This is normal for the first 24 hours.   9. You may have a dry mouth, a sore throat, muscle aches or trouble sleeping.  These are normal and will go away after 24 hours. A sore throat is most common. Use lozenges or gargle with salt water to ease the discomfort.   10. Do not make important or legal decisions.      Call your doctor for any of the followin. Chest pain, and/or shortness of breath  2. Abdominal  pain, bloating or cramping that has not improved or does not respond to pain reliving medications (Tylenol or narcotics if prescribed)   3. Difficulty swallowing or feeling as though food or liquids are stuck in your throat   4. Sore throat lasting more than 2 days or pain that has worsened over time   5. Black or tarry stools   6. Nausea and/or vomiting that is not resolving or has not responded to anti-nausea medications prescribed to you   7. It has been over 8 to 10 hours since surgery and you are still not able to urinate (pass water)   8. Headache for over 24 hours   9. Fever over 100.5 F (38 C) lasting more than 24 hours after the procedure   10. Signs of jaundice or blockage (fever, chills, abdominal pain, yellowing of the whites of your eyes, yellowing of your skin, and/or passing darker than normal urine)     To contact a doctor, call:   [ ] ___Dr. Guru Canada @ [phone] (GI Clinic)________ (Monday thru Friday 8:00am to 4:30pm)   [ ] [phone] and ask for the _GI____ resident on call (answered 24 hours a day)   [ ] Emergency Department: [facility]: [phone]     Take it easy when you get home.  Remember, same day surgery DOES NOT MEAN SAME DAY RECOVERY!  Healing is a gradual process.  You will need some time to recover - you may be more tired than you realize at first.  Rest and relax for at least the first 24 hours at home.  You'll feel better and heal faster if you take good care of yourself.

## 2021-03-01 ENCOUNTER — PREP FOR PROCEDURE (OUTPATIENT)
Dept: GASTROENTEROLOGY | Facility: CLINIC | Age: 61
End: 2021-03-01

## 2021-03-01 ENCOUNTER — PATIENT OUTREACH (OUTPATIENT)
Dept: GASTROENTEROLOGY | Facility: CLINIC | Age: 61
End: 2021-03-01

## 2021-03-01 ENCOUNTER — TELEPHONE (OUTPATIENT)
Dept: TRANSPLANT | Facility: CLINIC | Age: 61
End: 2021-03-01

## 2021-03-01 DIAGNOSIS — Z94.4 S/P LIVER TRANSPLANT (H): Primary | ICD-10-CM

## 2021-03-01 DIAGNOSIS — K83.1 BILIARY STRICTURE (H): Primary | ICD-10-CM

## 2021-03-01 NOTE — TELEPHONE ENCOUNTER
Patient Call: General  Route to LPN    Reason for call: connect with pt regarding few questions     Call back needed? Yes    Return Call Needed  Same as documented in contacts section  When to return call?: Greater than one day: Route standard priority

## 2021-03-01 NOTE — TELEPHONE ENCOUNTER
Post ERCP (2/24/21) with Dr. Canada: Follow-up    Post procedure recommendations:   Will recommend a repeat ERCP in 10-12 weeks to  re-evaluate the biliary stricture.     - To recheck LFTs in 2 weeks to confirm improvement     Orders placed:   Please assist in scheduling:     Procedure/Imaging/Clinic: ERCP  Physician: Dr. Canada  Timing: 10-12 weeks- MyChart is OK  Procedure length:90 min  Anesthesia:general  Dx: biliary stricture  Tier:3  [facility]: [facility]      Labs ordered, pt knows go get drawn      Patient states: feeling fine, no concerning symptoms. Understands follow up.     Clinic contact and scheduling numbers verified for future questions/concerns.    Jennifer Pedro, RN Care Coordinator

## 2021-03-02 ENCOUNTER — HOSPITAL ENCOUNTER (OUTPATIENT)
Dept: PHYSICAL THERAPY | Facility: CLINIC | Age: 61
Setting detail: THERAPIES SERIES
End: 2021-03-02
Attending: ORTHOPAEDIC SURGERY
Payer: COMMERCIAL

## 2021-03-02 PROCEDURE — 97110 THERAPEUTIC EXERCISES: CPT | Mod: GP | Performed by: PHYSICAL MEDICINE & REHABILITATION

## 2021-03-04 ENCOUNTER — HOSPITAL ENCOUNTER (OUTPATIENT)
Dept: PHYSICAL THERAPY | Facility: CLINIC | Age: 61
Setting detail: THERAPIES SERIES
End: 2021-03-04
Attending: ORTHOPAEDIC SURGERY
Payer: COMMERCIAL

## 2021-03-04 PROCEDURE — 97110 THERAPEUTIC EXERCISES: CPT | Mod: GP | Performed by: PHYSICAL MEDICINE & REHABILITATION

## 2021-03-04 NOTE — PROGRESS NOTES
"Outpatient Physical Therapy Discharge Note     Patient: Nicci Pemberton  : 1960    Beginning/End Dates of Reporting Period:  10/28/20 to 3/4/2021    Referring Provider: Mario Wallace MD    Therapy Diagnosis: R knee pain s/p TKA     Client Self Report: Pt reports knee still can get sore at times, dependent on activity. States L knee is now her \"bad knee\" and her right knee is her \"good knee.\"    Objective Measurements:  Objective Measure: R knee ROM (end of therapy)  Details: 0-112* (with overpressure)  Objective Measure: Strength   Details: flexion: 5/5. extension: 5/5  Objective Measure: squat observation  Details: pt able to peform squat 3 times during therapy session without pain     Outcome Measures (most recent score):  LEFS: 40/80 (10/80 at initial eval)    Goals:  Goal Identifier 1   Goal Description Pt will be able to stand for 15 minutes without increase in sx in order to decrease difficulty with ADLs.    Target Date 20   Date Met  21   Progress:     Goal Identifier 2   Goal Description Pt will be able to reach 0* knee extension in order to decrease difficulty with walking without AD.    Target Date 20   Date Met  21   Progress:     Goal Identifier 3   Goal Description Pt will be able to flex R knee 110* in order to decrease difficulty with stairs and home navigation.    Target Date 21   Date Met  21   Progress:     Goal Identifier 4   Goal Description Pt will be independent with HEP in order to self manage symptoms.    Target Date 21   Date Met  21   Progress:     Goal Identifier 5   Goal Description Pt will be able to perform mini squat and demonstrate 5/5 R knee strength in order to decrease difficulty with ADLs.    Target Date 21   Date Met  21   Progress:     Progress Toward Goals:   Progress this reporting period: Pt attended 23 PT sessions, and achieved 5/5 short term and long term goals. Pt's ROM, strength, gait and pain have " improved since starting PT. Pt's progress was slow throughout plan of care due to several comorbidities and hospitalizations. Pt is appropriate for D/C at this time as she has met all goals and is independent with HEP.     Plan:  Discharge from therapy.    Discharge:    Reason for Discharge: Patient has met all goals.  Patient chooses to discontinue therapy.    Equipment Issued: therabands    Discharge Plan: Patient to continue home program.    Please contact me with any questions or concerns.    Thank you for your referral,     Martita Milligan, PT, DPT  Physical Therapist  [facility]  [address] [place] [address] [phone]

## 2021-03-07 ENCOUNTER — HEALTH MAINTENANCE LETTER (OUTPATIENT)
Age: 61
End: 2021-03-07

## 2021-03-10 DIAGNOSIS — Z94.4 S/P LIVER TRANSPLANT (H): ICD-10-CM

## 2021-03-10 LAB
ALBUMIN SERPL-MCNC: 3.6 G/DL (ref 3.4–5)
ALP SERPL-CCNC: 172 U/L (ref 40–150)
ALT SERPL W P-5'-P-CCNC: 65 U/L (ref 0–50)
AST SERPL W P-5'-P-CCNC: 68 U/L (ref 0–45)
BILIRUB DIRECT SERPL-MCNC: 0.4 MG/DL (ref 0–0.2)
BILIRUB SERPL-MCNC: 1.7 MG/DL (ref 0.2–1.3)
PROT SERPL-MCNC: 7.5 G/DL (ref 6.8–8.8)

## 2021-03-10 PROCEDURE — 80076 HEPATIC FUNCTION PANEL: CPT | Performed by: INTERNAL MEDICINE

## 2021-03-10 PROCEDURE — 36415 COLL VENOUS BLD VENIPUNCTURE: CPT | Performed by: INTERNAL MEDICINE

## 2021-03-11 ENCOUNTER — TELEPHONE (OUTPATIENT)
Dept: TRANSPLANT | Facility: CLINIC | Age: 61
End: 2021-03-11

## 2021-03-11 DIAGNOSIS — Z94.4 STATUS POST LIVER TRANSPLANTATION (H): Primary | ICD-10-CM

## 2021-03-11 DIAGNOSIS — R60.9 EDEMA, UNSPECIFIED TYPE: Primary | ICD-10-CM

## 2021-03-11 DIAGNOSIS — R79.89 ELEVATED LFTS: ICD-10-CM

## 2021-03-11 NOTE — TELEPHONE ENCOUNTER
Per DR leventhal pt to have a liver biopsy. Pt aware and prefers either Monday or Thursday due to covid testing.

## 2021-03-12 ENCOUNTER — TELEPHONE (OUTPATIENT)
Dept: TRANSPLANT | Facility: CLINIC | Age: 61
End: 2021-03-12

## 2021-03-12 DIAGNOSIS — R79.89 ELEVATED LFTS: Primary | ICD-10-CM

## 2021-03-12 DIAGNOSIS — Z11.59 ENCOUNTER FOR SCREENING FOR OTHER VIRAL DISEASES: ICD-10-CM

## 2021-03-12 NOTE — PROGRESS NOTES
Patient is a 60 year old female with history of ANGULO cirrhosis, status post liver transplant in 2019. Patient now with elevated LFTs. Patient's team requesting random liver biopsy.     Patient was contacted by telephone and given a brief overview of the indication, procedure, risks/benefits, and post procedure management. All of their initial questions were answered, and the patient is amenable to proceeding as scheduled.    Approximately 15 minutes was spent with the patient via telephone during this encounter.    Labs WNL for procedure. COVID test is pending.    Consent will be done prior to procedure.     Eric Nation PA-C  Interventional Radiology  [phone] pgr.

## 2021-03-12 NOTE — TELEPHONE ENCOUNTER
Spoke with the patient and confirmed liver biopsy appointments on 3/25/21.  Informed patient an itinerary can be accessed on [employer].

## 2021-03-15 ENCOUNTER — NURSE TRIAGE (OUTPATIENT)
Dept: NURSING | Facility: CLINIC | Age: 61
End: 2021-03-15

## 2021-03-15 NOTE — TELEPHONE ENCOUNTER
RN Triage:    Spoke with 60 yr old Nicci who is requesting to schedule COVID-19 vaccine and also a COVID-19 test pre procedure.    PLAN:  Advised if meets eligibility can schedule via MyChart or scheduling line.  Order is in chart for pre procedure PCR test.  Scheduling should be contacting the patient as well.    Nicci Prieto RN  [facility]      Reason for Disposition    [1] Requesting COVID-19 vaccine AND [2] vaccine available in the community for this patient group    Additional Information    Negative: [1] Difficulty breathing or swallowing AND [2] starts within 2 hours after injection    Negative: Sounds like a life-threatening emergency to the triager    Negative: [1] COVID-19 exposure AND [2] no symptoms    Negative: [1] Typical COVID-19 symptoms AND [2] symptoms that are NOT expected from vaccine (e.g., cough, difficulty breathing, loss of taste or smell, runny nose, sore throat)    Negative: [1] Typical COVID-19 symptoms AND [2] started > 3 days after getting vaccine    Negative: Fever > 104 F (40 C)    Negative: Sounds like a severe, unusual reaction to the triager    Negative: [1] Redness or red streak around the injection site AND [2] started > 48 hours after getting vaccine AND [3] fever    Negative: [1] Fever > 101 F (38.3 C) AND [2] age > 60 AND [3] started > 48 hours after getting vaccine    Negative: [1] Fever > 100.0 F (37.8 C) AND [2] bedridden (e.g., nursing home patient, CVA, chronic illness, recovering from surgery) AND [3] started > 48 hours after getting vaccine    Negative: [1] Fever > 100.0 F (37.8 C) AND [2] diabetes mellitus or weak immune system (e.g., HIV positive, cancer chemo, splenectomy, organ transplant, chronic steroids) AND [3] started > 48 hours after getting vaccine    Negative: [1] Redness or red streak around the injection site AND [2] started > 48 hours after getting vaccine AND [3] no fever  (Exception: red area < 1 inch or 2.5 cm wide)    Negative: [1] Pain,  tenderness, or swelling at the injection site AND [2] over 3 days (72 hours) since vaccine AND [3] getting worse    Negative: Fever > 100.0 F (37.8 C) present > 3 days (72 hours)    Negative: [1] Fever > 100.0 F (37.8 C) AND [2] healthcare worker    Negative: [1] Pain, tenderness, or swelling at the injection site AND [2] lasts > 7 days    Negative: [1] Requesting COVID-19 vaccine AND [2] healthcare worker (e.g., EMS first responders, doctors, nurses)    Negative: [1] Requesting COVID-19 vaccine AND [2] resident of a long-term care facility (e.g., nursing home)    Protocols used: CORONAVIRUS (COVID-19) VACCINE QUESTIONS AND [id number] 1.3

## 2021-03-16 ENCOUNTER — TELEPHONE (OUTPATIENT)
Dept: ORTHOPEDICS | Facility: CLINIC | Age: 61
End: 2021-03-16

## 2021-03-16 DIAGNOSIS — Z94.4 STATUS POST LIVER TRANSPLANTATION (H): Primary | ICD-10-CM

## 2021-03-16 NOTE — TELEPHONE ENCOUNTER
Nicci wanted to clarify that she does not need IV antibiotics prior to her liver biopsy scheduled for 3/25 in IR.    This RN will discuss with Dr. Wallace on Thursday when I am in clinic with him and return call or message to her.    Nicci appreciated the follow-up.    Shala Hazel, BSN, RN  RN Care Coordinator, Dr. Rodrigo [facility]

## 2021-03-18 ENCOUNTER — IMMUNIZATION (OUTPATIENT)
Dept: FAMILY MEDICINE | Facility: CLINIC | Age: 61
End: 2021-03-18
Payer: COMMERCIAL

## 2021-03-18 PROCEDURE — 0011A PR COVID VAC MODERNA 100 MCG/0.5 ML IM: CPT

## 2021-03-18 PROCEDURE — 91301 PR COVID VAC MODERNA 100 MCG/0.5 ML IM: CPT

## 2021-03-22 ENCOUNTER — MYC MEDICAL ADVICE (OUTPATIENT)
Dept: INTERVENTIONAL RADIOLOGY/VASCULAR | Facility: CLINIC | Age: 61
End: 2021-03-22

## 2021-03-22 DIAGNOSIS — Z11.59 ENCOUNTER FOR SCREENING FOR OTHER VIRAL DISEASES: ICD-10-CM

## 2021-03-22 LAB
SARS-COV-2 RNA RESP QL NAA+PROBE: NORMAL
SPECIMEN SOURCE: NORMAL

## 2021-03-22 PROCEDURE — U0003 INFECTIOUS AGENT DETECTION BY NUCLEIC ACID (DNA OR RNA); SEVERE ACUTE RESPIRATORY SYNDROME CORONAVIRUS 2 (SARS-COV-2) (CORONAVIRUS DISEASE [COVID-19]), AMPLIFIED PROBE TECHNIQUE, MAKING USE OF HIGH THROUGHPUT TECHNOLOGIES AS DESCRIBED BY CMS-2020-01-R: HCPCS | Performed by: INTERNAL MEDICINE

## 2021-03-22 PROCEDURE — U0005 INFEC AGEN DETEC AMPLI PROBE: HCPCS | Performed by: INTERNAL MEDICINE

## 2021-03-23 LAB
LABORATORY COMMENT REPORT: NORMAL
SARS-COV-2 RNA RESP QL NAA+PROBE: NEGATIVE
SPECIMEN SOURCE: NORMAL

## 2021-03-25 ENCOUNTER — HOSPITAL ENCOUNTER (OUTPATIENT)
Facility: CLINIC | Age: 61
Discharge: HOME | End: 2021-03-25
Attending: INTERNAL MEDICINE | Admitting: PHYSICIAN ASSISTANT
Payer: COMMERCIAL

## 2021-03-25 ENCOUNTER — APPOINTMENT (OUTPATIENT)
Dept: MEDSURG UNIT | Facility: CLINIC | Age: 61
End: 2021-03-25
Attending: INTERNAL MEDICINE
Payer: COMMERCIAL

## 2021-03-25 ENCOUNTER — APPOINTMENT (OUTPATIENT)
Dept: INTERVENTIONAL RADIOLOGY/VASCULAR | Facility: CLINIC | Age: 61
End: 2021-03-25
Attending: PHYSICIAN ASSISTANT
Payer: COMMERCIAL

## 2021-03-25 VITALS
OXYGEN SATURATION: 96 % | DIASTOLIC BLOOD PRESSURE: 78 MMHG | HEART RATE: 68 BPM | SYSTOLIC BLOOD PRESSURE: 146 MMHG | TEMPERATURE: 98.2 F | RESPIRATION RATE: 18 BRPM

## 2021-03-25 DIAGNOSIS — R79.89 ELEVATED LFTS: ICD-10-CM

## 2021-03-25 LAB
ANION GAP SERPL CALCULATED.3IONS-SCNC: 6 MMOL/L (ref 3–14)
BASOPHILS # BLD AUTO: 0 10E9/L (ref 0–0.2)
BASOPHILS NFR BLD AUTO: 0.2 %
BUN SERPL-MCNC: 24 MG/DL (ref 7–30)
CALCIUM SERPL-MCNC: 9.6 MG/DL (ref 8.5–10.1)
CHLORIDE SERPL-SCNC: 107 MMOL/L (ref 94–109)
CO2 SERPL-SCNC: 28 MMOL/L (ref 20–32)
CREAT SERPL-MCNC: 0.97 MG/DL (ref 0.52–1.04)
DIFFERENTIAL METHOD BLD: ABNORMAL
EOSINOPHIL # BLD AUTO: 0.2 10E9/L (ref 0–0.7)
EOSINOPHIL NFR BLD AUTO: 3.9 %
ERYTHROCYTE [DISTWIDTH] IN BLOOD BY AUTOMATED COUNT: 12 % (ref 10–15)
GFR SERPL CREATININE-BSD FRML MDRD: 63 ML/MIN/{1.73_M2}
GLUCOSE SERPL-MCNC: 81 MG/DL (ref 70–99)
HCT VFR BLD AUTO: 39.1 % (ref 35–47)
HGB BLD-MCNC: 13.2 G/DL (ref 11.7–15.7)
IMM GRANULOCYTES # BLD: 0 10E9/L (ref 0–0.4)
IMM GRANULOCYTES NFR BLD: 0.6 %
INR PPP: 1.02 (ref 0.86–1.14)
INR PPP: NORMAL (ref 0.86–1.14)
LYMPHOCYTES # BLD AUTO: 1.4 10E9/L (ref 0.8–5.3)
LYMPHOCYTES NFR BLD AUTO: 26 %
MCH RBC QN AUTO: 32.8 PG (ref 26.5–33)
MCHC RBC AUTO-ENTMCNC: 33.8 G/DL (ref 31.5–36.5)
MCV RBC AUTO: 97 FL (ref 78–100)
MONOCYTES # BLD AUTO: 0.6 10E9/L (ref 0–1.3)
MONOCYTES NFR BLD AUTO: 10.5 %
NEUTROPHILS # BLD AUTO: 3.2 10E9/L (ref 1.6–8.3)
NEUTROPHILS NFR BLD AUTO: 58.8 %
NRBC # BLD AUTO: 0 10*3/UL
NRBC BLD AUTO-RTO: 0 /100
PLATELET # BLD AUTO: 142 10E9/L (ref 150–450)
POTASSIUM SERPL-SCNC: 4.1 MMOL/L (ref 3.4–5.3)
RBC # BLD AUTO: 4.03 10E12/L (ref 3.8–5.2)
SODIUM SERPL-SCNC: 141 MMOL/L (ref 133–144)
WBC # BLD AUTO: 5.4 10E9/L (ref 4–11)

## 2021-03-25 PROCEDURE — 85610 PROTHROMBIN TIME: CPT | Performed by: RADIOLOGY

## 2021-03-25 PROCEDURE — 99152 MOD SED SAME PHYS/QHP 5/>YRS: CPT

## 2021-03-25 PROCEDURE — 88307 TISSUE EXAM BY PATHOLOGIST: CPT | Mod: TC | Performed by: INTERNAL MEDICINE

## 2021-03-25 PROCEDURE — 88341 IMHCHEM/IMCYTCHM EA ADD ANTB: CPT | Mod: TC | Performed by: INTERNAL MEDICINE

## 2021-03-25 PROCEDURE — 88307 TISSUE EXAM BY PATHOLOGIST: CPT | Mod: 26 | Performed by: PATHOLOGY

## 2021-03-25 PROCEDURE — 76942 ECHO GUIDE FOR BIOPSY: CPT | Mod: 26 | Performed by: PHYSICIAN ASSISTANT

## 2021-03-25 PROCEDURE — 76942 ECHO GUIDE FOR BIOPSY: CPT

## 2021-03-25 PROCEDURE — 88313 SPECIAL STAINS GROUP 2: CPT | Mod: 26 | Performed by: PATHOLOGY

## 2021-03-25 PROCEDURE — 80048 BASIC METABOLIC PNL TOTAL CA: CPT | Performed by: NURSE PRACTITIONER

## 2021-03-25 PROCEDURE — 85025 COMPLETE CBC W/AUTO DIFF WBC: CPT | Performed by: NURSE PRACTITIONER

## 2021-03-25 PROCEDURE — 88313 SPECIAL STAINS GROUP 2: CPT | Mod: TC | Performed by: INTERNAL MEDICINE

## 2021-03-25 PROCEDURE — 88342 IMHCHEM/IMCYTCHM 1ST ANTB: CPT | Mod: TC | Performed by: INTERNAL MEDICINE

## 2021-03-25 PROCEDURE — 250N000009 HC RX 250: Performed by: PHYSICIAN ASSISTANT

## 2021-03-25 PROCEDURE — 47000 NEEDLE BIOPSY OF LIVER PERQ: CPT | Performed by: PHYSICIAN ASSISTANT

## 2021-03-25 PROCEDURE — 99152 MOD SED SAME PHYS/QHP 5/>YRS: CPT | Performed by: PHYSICIAN ASSISTANT

## 2021-03-25 PROCEDURE — 250N000011 HC RX IP 250 OP 636: Performed by: PHYSICIAN ASSISTANT

## 2021-03-25 PROCEDURE — 88341 IMHCHEM/IMCYTCHM EA ADD ANTB: CPT | Mod: 26 | Performed by: PATHOLOGY

## 2021-03-25 PROCEDURE — 88342 IMHCHEM/IMCYTCHM 1ST ANTB: CPT | Mod: 26 | Performed by: PATHOLOGY

## 2021-03-25 PROCEDURE — 999N000133 HC STATISTIC PP CARE STAGE 2

## 2021-03-25 RX ORDER — NALOXONE HYDROCHLORIDE 0.4 MG/ML
0.2 INJECTION, SOLUTION INTRAMUSCULAR; INTRAVENOUS; SUBCUTANEOUS
Status: DISCONTINUED | OUTPATIENT
Start: 2021-03-25 | End: 2021-03-25 | Stop reason: HOSPADM

## 2021-03-25 RX ORDER — FENTANYL CITRATE 50 UG/ML
25-50 INJECTION, SOLUTION INTRAMUSCULAR; INTRAVENOUS EVERY 5 MIN PRN
Status: DISCONTINUED | OUTPATIENT
Start: 2021-03-25 | End: 2021-03-25 | Stop reason: HOSPADM

## 2021-03-25 RX ORDER — NALOXONE HYDROCHLORIDE 0.4 MG/ML
0.4 INJECTION, SOLUTION INTRAMUSCULAR; INTRAVENOUS; SUBCUTANEOUS
Status: DISCONTINUED | OUTPATIENT
Start: 2021-03-25 | End: 2021-03-25 | Stop reason: HOSPADM

## 2021-03-25 RX ORDER — LIDOCAINE 40 MG/G
CREAM TOPICAL
Status: DISCONTINUED | OUTPATIENT
Start: 2021-03-25 | End: 2021-03-25 | Stop reason: HOSPADM

## 2021-03-25 RX ORDER — DEXTROSE MONOHYDRATE 25 G/50ML
25-50 INJECTION, SOLUTION INTRAVENOUS
Status: DISCONTINUED | OUTPATIENT
Start: 2021-03-25 | End: 2021-03-25 | Stop reason: HOSPADM

## 2021-03-25 RX ORDER — SODIUM CHLORIDE 9 MG/ML
INJECTION, SOLUTION INTRAVENOUS CONTINUOUS
Status: DISCONTINUED | OUTPATIENT
Start: 2021-03-25 | End: 2021-03-25 | Stop reason: HOSPADM

## 2021-03-25 RX ORDER — NICOTINE POLACRILEX 4 MG
15-30 LOZENGE BUCCAL
Status: DISCONTINUED | OUTPATIENT
Start: 2021-03-25 | End: 2021-03-25 | Stop reason: HOSPADM

## 2021-03-25 RX ORDER — FLUMAZENIL 0.1 MG/ML
0.2 INJECTION, SOLUTION INTRAVENOUS
Status: DISCONTINUED | OUTPATIENT
Start: 2021-03-25 | End: 2021-03-25 | Stop reason: HOSPADM

## 2021-03-25 RX ADMIN — MIDAZOLAM 2 MG: 1 INJECTION INTRAMUSCULAR; INTRAVENOUS at 14:50

## 2021-03-25 RX ADMIN — LIDOCAINE HYDROCHLORIDE 5 ML: 10 INJECTION, SOLUTION EPIDURAL; INFILTRATION; INTRACAUDAL; PERINEURAL at 14:50

## 2021-03-25 RX ADMIN — FENTANYL CITRATE 100 MCG: 50 INJECTION, SOLUTION INTRAMUSCULAR; INTRAVENOUS at 14:50

## 2021-03-25 NOTE — IP AVS SNAPSHOT
After Visit Summary Template Not Found    This Print Group is only intended to be used in the After Visit Summary and can only be used in a report that uses a released After Visit Summary Template.                       MRN:[id number]                      After Visit Summary   3/25/2021    Nicci Pemberton    MRN: [id number]           Visit Information        Department      3/25/2021 10:50 AM [facility] [address] [place]          Review of your medicines      UNREVIEWED medicines. Ask your doctor about these medicines       Dose / Directions   acetaminophen 325 MG tablet  Commonly known as: TYLENOL  Used for: Chronic pain of both knees      Dose: 650 mg  Take 2 tablets (650 mg) by mouth every 4 hours  Quantity: 100 tablet  Refills: 0     cycloSPORINE modified 25 MG capsule  Commonly known as: GENERIC EQUIVALENT  Used for: Liver replaced by transplant (H)      Dose: 75 mg  Take 3 capsules (75 mg) by mouth 2 times daily  Quantity: 540 capsule  Refills: 3     famotidine 20 MG tablet  Commonly known as: PEPCID  Used for: Status post liver transplantation (H)      Dose: 20 mg  Take 1 tablet (20 mg) by mouth 2 times daily  Quantity: 60 tablet  Refills: 3     levothyroxine 125 MCG tablet  Commonly known as: SYNTHROID/LEVOTHROID  Used for: Status post liver transplantation (H)      Dose: 125 mcg  Take 125 mcg by mouth daily  Refills: 0     Vitamin D-3 25 MCG (1000 UT) Caps  Used for: Severe malnutrition (H)      Dose: 1,000 Units  Take 1,000 Units by mouth 2 times daily  Quantity: 60 capsule  Refills: 1        CONTINUE these medicines which have NOT CHANGED       Dose / Directions   COMPRESSION STOCKINGS  Used for: Edema, unspecified type      Dose: 2 each  2 each every 12 hours  Quantity: 2 Product  Refills: 1              Protect others around you: Learn how to safely use, store and throw away your medicines at www.disposemymeds.org.       Follow-ups after your visit       Your next 10 appointments already  scheduled    Apr 15, 2021 10:55 AM  COVID-19 Vaccine 2nd Dose with [place] COVID VACCINE 28 DAY MODERNA  [facility] ([facility] [place] )  Arrive at: COVID-19 Vaccine [place] [address]  [facility] [address]  [phone]   Please arrive at your scheduled appointment time and expect your appointment to last up to 45 minutes.    If you feel sick or have a fever, please call [phone] to reschedule. Please wear a short sleeved shirt and mask. And only those getting a vaccine should come to allow for social distancing.    Please see [email or web address] to prepare for your vaccine visit.        Care Instructions       Further instructions from your care team       [facility]    Interventional Radiology  Patient Instructions Following Liver Biopsy    AFTER YOU GO HOME  ? If you were given sedation DO NOT drive or operate machinery at home or at work for at least 24 hours  ? DO relax and take it easy for 48 hours, no strenuous activity for 24 hours  ? DO drink plenty of fluids  ? DO resume your regular diet, unless otherwise instructed by your Primary Physician  ? Keep the dressing dry and in place for 24 hours.  ? DO NOT SMOKE FOR AT LEAST 24 HOURS, if you have been given any medications that were to help you relax or sedate you during your procedure  ? DO NOT drink alcoholic beverages the day of your procedure  ? DO NOT do any strenuous exercise or lifting (> 10 lbs) for at least 7 days following your procedure  ? DO NOT take a bath or shower for at least 12 hours following your procedure  ? Remove dressing after shower the next day. Replace with Band aid for 2 days.  Never leave a wet dressing in place.  ? DO NOT make any important or legal decisions for 24 hours following your procedure  ? There should be minimum drainage from the biopsy site    CALL THE PHYSICIAN IF:  ? You start bleeding from the procedure site.  If  you do start to bleed from that site, lie down flat and hold pressure on the site for a minimum of 10 minutes.  Your physician will tell you if you need to return to the hospital  ? You develop nausea or vomiting  ? You have excessive swelling, redness, or tenderness at the site  ? You have drainage that looks like it is infected.  ? You experience severe pain  ? You develop hives or a rash or unexplained itching  ? You develop shortness of breath  ? You develop a temperature of 101 degrees F or greater  ? You develop bloody clots or red urine after you are discharged  ? You develop chest pain or cough up blood, lightheadedness or fainting    [facility] INTERVENTIONAL RADIOLOGY DEPARTMENT  Procedure Physician: Angelica Alvarez    Date of procedure: March 25, 2021  Telephone Numbers: [phone] Monday-Friday 8:00 am to 4:30 pm  [phone] After 4:30 pm Monday-Friday, Weekends & Holidays.   Ask for the Interventional Radiologist on call.  Someone is on call 24 hrs/day  [facility] toll free number: [phone] Monday-Friday 8:00 am to 4:30 pm  [facility] Emergency Dept: [phone]          Additional Information About Your Visit       [facility] Information    [employer] gives you secure access to your electronic health record. If you see a primary care provider, you can also send messages to your care team and make appointments. If you have questions, please call your primary care clinic.  If you do not have a primary care provider, please call [phone] and they will assist you.       Care EveryWhere ID    This is your Care EveryWhere ID. This could be used by other organizations to access your [facility] medical records  [id number]       Your Vitals Were  Most recent update: 3/25/2021  4:09 PM    Blood Pressure   148/82   (BP Location: Right arm)    Pulse   70    Temperature   98.2  F (36.8  C) (Oral)    Respirations   18    Pulse Oximetry   96%          Primary Care Provider Office Phone # Fax #    Wale Thurston [phone]  [phone]      Equal Access to Services    [facility] : Hadii alma fischer lexi Monge, wazeda luqyonnyha, qaarikta kapoonammeme manlashandameme, kimi leivamekhiekaterina sanabria. So [facility] [phone].    ATENCIÓN: Si habla español, tiene a gaston disposición servicios gratuitos de asistencia lingüística. Llame al [phone].    We comply with applicable federal and state civil rights laws, including the [place] Human Rights Act. We do not discriminate on the basis of race, color, creed, Hindu, national origin, marital status, age, disability, sex, sexual orientation, or gender identity.    If you would like an itemization of your charges they will now be available in [employer] 30 days after discharge. To access the itemized statements in [employer] go to billing/billing summary. From there select view account. There will be multiple tabs showing an overview of your account, detail, payments, and communications. From the communications tab you can see your monthly statements, your itemized statements, and any billing letters generated for your account. If you do not have a [employer] account and need help getting access please contact [employer] support at [phone].  If you would prefer to have your itemized statements mailed please contact our automated itemized bill request line at [phone] option  2.       Thank you!    Thank you for choosing [facility] for your care. Our goal is always to provide you with excellent care. Hearing back from our patients is one way we can continue to improve our services. Please take a few minutes to complete the written survey that you may receive in the mail after you visit with us. Thank you!            Medication List      Medications          Morning Afternoon Evening Bedtime As Needed    COMPRESSION STOCKINGS  INSTRUCTIONS: 2 each every 12 hours  Doctor's comments: BLE knee high Velcro compression garments  - Hoping to be get measured by [facility] (and would like the  order faxed there if possible (Fax Number: [phone]).                       ASK your doctor about these medications          Morning Afternoon Evening Bedtime As Needed    cycloSPORINE modified 25 MG capsule  Also known as: GENERIC EQUIVALENT  INSTRUCTIONS: Take 3 capsules (75 mg) by mouth 2 times daily  This medication is very important: It prevents organ rejection.                     acetaminophen 325 MG tablet  Also known as: TYLENOL  INSTRUCTIONS: Take 2 tablets (650 mg) by mouth every 4 hours                     famotidine 20 MG tablet  Also known as: PEPCID  INSTRUCTIONS: Take 1 tablet (20 mg) by mouth 2 times daily                     levothyroxine 125 MCG tablet  Also known as: SYNTHROID/LEVOTHROID  INSTRUCTIONS: Take 125 mcg by mouth daily  Doctor's comments: Managed by PCP                     Vitamin D-3 25 MCG (1000 UT) Caps  INSTRUCTIONS: Take 1,000 Units by mouth 2 times daily

## 2021-03-25 NOTE — PRE-PROCEDURE
GENERAL PRE-PROCEDURE:   Procedure:  Image guided liver biopsy with sedation  Date/Time:  3/25/2021 1:22 PM    Written consent obtained?: Yes    Risks and benefits: Risks, benefits and alternatives were discussed    Consent given by:  Patient  Patient states understanding of procedure being performed: Yes    Patient's understanding of procedure matches consent: Yes    Procedure consent matches procedure scheduled: Yes    Expected level of sedation:  Moderate  Appropriately NPO:  Yes  ASA Class:  Class 3- Severe systemic disease, definite functional limitations  Mallampati  :  Grade 2- soft palate, base of uvula, tonsillar pillars, and portion of posterior pharyngeal wall visible  Lungs:  Lungs clear with good breath sounds bilaterally  Heart:  Normal heart sounds and rate  History & Physical reviewed:  Abbreviated history and physical done prior to moderate sedation  Statement of review:  I have reviewed the lab findings, diagnostic data, medications, and the plan for sedation    Interventional Radiology Pre-Procedure Focused H&P Assessment     Reason for procedure:  60 year female with past medical history of obesity lymphedema cirrhosis secondary to nonalcoholic fatty liver disease iron overload and alpha-1 antitrypsin phenotype status post  donor liver transplantation on 2019 presents with elevated liver function tests requested for an image guided nontargeted, random liver biopsy     History: see Epic for medical history    Surgical: see Epic for surgical history    Allergies: see Epic for updated allergy list    Medications: See Epic for current medication list    Family History: See Epic for current family history    Social History: See Epic for current social history    ROS: negative other than noted above    Exam:  Refer above    Kirstie Hawkins PA-C   Interventional Radiology

## 2021-03-25 NOTE — IP AVS SNAPSHOT
[facility] [address] [address]                                    After Visit Summary   3/25/2021    Nicci Pemberton    MRN: [id number]           After Visit Summary Signature Page    I have received my discharge instructions, and my questions have been answered. I have discussed any challenges I see with this plan with the nurse or doctor.    ..........................................................................................................................................  Patient/Patient Representative Signature      ..........................................................................................................................................  Patient Representative Print Name and Relationship to Patient    ..................................................               ................................................  Date                                   Time    ..........................................................................................................................................  Reviewed by Signature/Title    ...................................................              ..............................................  Date                                               Time          22EPIC Rev 08/18

## 2021-03-25 NOTE — PROCEDURES
[facility] [place]    Procedure: IR Procedure Note    Date/Time: 3/25/2021 3:14 PM  Performed by: Kirstie Hawkins PA-C  Authorized by: Kirstie Hawkins PA-C   IR Fellow Physician:  Other(s) attending procedure: Angelica Alvarez PA-C    UNIVERSAL PROTOCOL   Site Marked: NA  Prior Images Obtained and Reviewed:  Yes  Required items: Required blood products, implants, devices and special equipment available    Patient identity confirmed:  Verbally with patient, arm band, provided demographic data and hospital-assigned identification number  Patient was reevaluated immediately before administering moderate or deep sedation or anesthesia  Confirmation Checklist:  Patient's identity using two indicators, relevant allergies, procedure was appropriate and matched the consent or emergent situation and correct equipment/implants were available  Time out: Immediately prior to the procedure a time out was called    Universal Protocol: the Joint Commission Universal Protocol was followed    Preparation: Patient was prepped and draped in usual sterile fashion    ESBL (mL):  1         ANESTHESIA    Anesthesia: Local infiltration  Local Anesthetic:  Lidocaine 1% without epinephrine  Anesthetic Total (mL):  10      SEDATION    Patient Sedated: Yes    Sedation Type:  Moderate (conscious) sedation  Sedation:  Fentanyl and midazolam  Vital signs: Vital signs monitored during sedation    Fluoroscopy Time: 0 minute(s)  See dictated procedure note for full details.  Findings: 100 mcg fentanyl and 2 mg Versed     Specimens: core needle biopsy specimens sent for pathological analysis    Complications: None    Condition: Stable    PROCEDURE   Patient Tolerance:  Patient tolerated the procedure well with no immediate complications  Describe Procedure: Completed ultrasound-guided liver random biopsy. 2 passes yielded 2 cores sent to pathology in preservative. Gelfoam in tract.    Length of time physician/provider  present for 1:1 monitoring during sedation: 20

## 2021-03-25 NOTE — PROGRESS NOTES
Patient Name: Nicci Pemberton  Medical Record Number: [id number]  Today's Date: 3/25/2021    Procedure: Image-guided random liver biopsy  Proceduralist: Angelica Alvarez PA-C    Procedure Start: 1430  Procedure end: 1450  Sedation medications administered: 100 mcg fentanyl and 2 mg Versed     Report given to: 2A RN  : n/a    Other Notes: Pt arrived to IR room 7 from . Consent reviewed. Pt denies any questions or concerns regarding procedure. Pt positioned SUPINE and monitored per protocol. Pt tolerated procedure without any noted complications. 2 core samples obtained and sent to pathology. Pt transferred back to .

## 2021-03-25 NOTE — PROGRESS NOTES
Pt returned from IR at 1500 via liter accompanied by nurse.Mid upper chest site C/D/I denies pain and n hematoma noted.Pt tolerating food and fluids.

## 2021-03-25 NOTE — PROGRESS NOTES
Patient tolerated recovery stage well. VSS, mid upper chest site clean/dry/intact, no hematoma, and denies pain. Patient tolerated PO food and fluids. Teaching was done and discharge instructions were given. Patient ambulated, voided, and PIV was removed. Patient discharged from the hospital via wheel chair to home with  peggy.

## 2021-03-25 NOTE — DISCHARGE INSTRUCTIONS
[facility]    Interventional Radiology  Patient Instructions Following Liver Biopsy    AFTER YOU GO HOME  ? If you were given sedation DO NOT drive or operate machinery at home or at work for at least 24 hours  ? DO relax and take it easy for 48 hours, no strenuous activity for 24 hours  ? DO drink plenty of fluids  ? DO resume your regular diet, unless otherwise instructed by your Primary Physician  ? Keep the dressing dry and in place for 24 hours.  ? DO NOT SMOKE FOR AT LEAST 24 HOURS, if you have been given any medications that were to help you relax or sedate you during your procedure  ? DO NOT drink alcoholic beverages the day of your procedure  ? DO NOT do any strenuous exercise or lifting (> 10 lbs) for at least 7 days following your procedure  ? DO NOT take a bath or shower for at least 12 hours following your procedure  ? Remove dressing after shower the next day. Replace with Band aid for 2 days.  Never leave a wet dressing in place.  ? DO NOT make any important or legal decisions for 24 hours following your procedure  ? There should be minimum drainage from the biopsy site    CALL THE PHYSICIAN IF:  ? You start bleeding from the procedure site.  If you do start to bleed from that site, lie down flat and hold pressure on the site for a minimum of 10 minutes.  Your physician will tell you if you need to return to the hospital  ? You develop nausea or vomiting  ? You have excessive swelling, redness, or tenderness at the site  ? You have drainage that looks like it is infected.  ? You experience severe pain  ? You develop hives or a rash or unexplained itching  ? You develop shortness of breath  ? You develop a temperature of 101 degrees F or greater  ? You develop bloody clots or red urine after you are discharged  ? You develop chest pain or cough up blood, lightheadedness or fainting    [facility] INTERVENTIONAL RADIOLOGY DEPARTMENT  Procedure Physician: Angelica Alvarez    Date of procedure:  March 25, 2021  Telephone Numbers: [phone] Monday-Friday 8:00 am to 4:30 pm  [phone] After 4:30 pm Monday-Friday, Weekends & Holidays.   Ask for the Interventional Radiologist on call.  Someone is on call 24 hrs/day  [facility] toll free number: [phone] Monday-Friday 8:00 am to 4:30 pm  [facility] Emergency Dept: [phone]

## 2021-03-26 ENCOUNTER — TELEPHONE (OUTPATIENT)
Dept: TRANSPLANT | Facility: CLINIC | Age: 61
End: 2021-03-26

## 2021-03-26 DIAGNOSIS — T86.41 LIVER TRANSPLANT REJECTION (H): ICD-10-CM

## 2021-03-27 ENCOUNTER — INFUSION THERAPY VISIT (OUTPATIENT)
Dept: INFUSION THERAPY | Facility: CLINIC | Age: 61
End: 2021-03-27
Attending: INTERNAL MEDICINE
Payer: COMMERCIAL

## 2021-03-27 VITALS
SYSTOLIC BLOOD PRESSURE: 130 MMHG | TEMPERATURE: 97.8 F | RESPIRATION RATE: 16 BRPM | DIASTOLIC BLOOD PRESSURE: 82 MMHG | HEART RATE: 63 BPM

## 2021-03-27 DIAGNOSIS — T86.41 LIVER TRANSPLANT REJECTION (H): Primary | ICD-10-CM

## 2021-03-27 LAB
ALBUMIN SERPL-MCNC: 3.2 G/DL (ref 3.4–5)
ALP SERPL-CCNC: 173 U/L (ref 40–150)
ALT SERPL W P-5'-P-CCNC: 72 U/L (ref 0–50)
ANION GAP SERPL CALCULATED.3IONS-SCNC: 4 MMOL/L (ref 3–14)
AST SERPL W P-5'-P-CCNC: 75 U/L (ref 0–45)
BASOPHILS # BLD AUTO: 0 10E9/L (ref 0–0.2)
BASOPHILS NFR BLD AUTO: 0.2 %
BILIRUB DIRECT SERPL-MCNC: 0.3 MG/DL (ref 0–0.2)
BILIRUB SERPL-MCNC: 1.4 MG/DL (ref 0.2–1.3)
BUN SERPL-MCNC: 26 MG/DL (ref 7–30)
CALCIUM SERPL-MCNC: 9 MG/DL (ref 8.5–10.1)
CHLORIDE SERPL-SCNC: 111 MMOL/L (ref 94–109)
CO2 SERPL-SCNC: 27 MMOL/L (ref 20–32)
CREAT SERPL-MCNC: 1.13 MG/DL (ref 0.52–1.04)
CYCLOSPORINE BLD LC/MS/MS-MCNC: 134 UG/L (ref 50–400)
DIFFERENTIAL METHOD BLD: ABNORMAL
EOSINOPHIL # BLD AUTO: 0.2 10E9/L (ref 0–0.7)
EOSINOPHIL NFR BLD AUTO: 4 %
ERYTHROCYTE [DISTWIDTH] IN BLOOD BY AUTOMATED COUNT: 12.2 % (ref 10–15)
GFR SERPL CREATININE-BSD FRML MDRD: 52 ML/MIN/{1.73_M2}
GLUCOSE SERPL-MCNC: 90 MG/DL (ref 70–99)
HCT VFR BLD AUTO: 37 % (ref 35–47)
HGB BLD-MCNC: 12.2 G/DL (ref 11.7–15.7)
IMM GRANULOCYTES # BLD: 0 10E9/L (ref 0–0.4)
IMM GRANULOCYTES NFR BLD: 0.4 %
LYMPHOCYTES # BLD AUTO: 0.9 10E9/L (ref 0.8–5.3)
LYMPHOCYTES NFR BLD AUTO: 17.9 %
MCH RBC QN AUTO: 31.9 PG (ref 26.5–33)
MCHC RBC AUTO-ENTMCNC: 33 G/DL (ref 31.5–36.5)
MCV RBC AUTO: 97 FL (ref 78–100)
MONOCYTES # BLD AUTO: 0.6 10E9/L (ref 0–1.3)
MONOCYTES NFR BLD AUTO: 11.8 %
NEUTROPHILS # BLD AUTO: 3.1 10E9/L (ref 1.6–8.3)
NEUTROPHILS NFR BLD AUTO: 65.7 %
NRBC # BLD AUTO: 0 10*3/UL
NRBC BLD AUTO-RTO: 0 /100
PLATELET # BLD AUTO: 127 10E9/L (ref 150–450)
POTASSIUM SERPL-SCNC: 4.3 MMOL/L (ref 3.4–5.3)
PROT SERPL-MCNC: 7 G/DL (ref 6.8–8.8)
RBC # BLD AUTO: 3.83 10E12/L (ref 3.8–5.2)
SODIUM SERPL-SCNC: 141 MMOL/L (ref 133–144)
TME LAST DOSE: NORMAL H
WBC # BLD AUTO: 4.7 10E9/L (ref 4–11)

## 2021-03-27 PROCEDURE — 85025 COMPLETE CBC W/AUTO DIFF WBC: CPT | Performed by: STUDENT IN AN ORGANIZED HEALTH CARE EDUCATION/TRAINING PROGRAM

## 2021-03-27 PROCEDURE — 80158 DRUG ASSAY CYCLOSPORINE: CPT | Performed by: STUDENT IN AN ORGANIZED HEALTH CARE EDUCATION/TRAINING PROGRAM

## 2021-03-27 PROCEDURE — 80048 BASIC METABOLIC PNL TOTAL CA: CPT | Performed by: STUDENT IN AN ORGANIZED HEALTH CARE EDUCATION/TRAINING PROGRAM

## 2021-03-27 PROCEDURE — 250N000011 HC RX IP 250 OP 636: Performed by: STUDENT IN AN ORGANIZED HEALTH CARE EDUCATION/TRAINING PROGRAM

## 2021-03-27 PROCEDURE — 258N000003 HC RX IP 258 OP 636: Performed by: STUDENT IN AN ORGANIZED HEALTH CARE EDUCATION/TRAINING PROGRAM

## 2021-03-27 PROCEDURE — 96365 THER/PROPH/DIAG IV INF INIT: CPT

## 2021-03-27 PROCEDURE — 80076 HEPATIC FUNCTION PANEL: CPT | Performed by: STUDENT IN AN ORGANIZED HEALTH CARE EDUCATION/TRAINING PROGRAM

## 2021-03-27 RX ADMIN — SODIUM CHLORIDE 500 MG: 9 INJECTION, SOLUTION INTRAVENOUS at 09:37

## 2021-03-27 NOTE — PATIENT INSTRUCTIONS
Dear Nicci Pemberton    Thank you for choosing [facility] ([facility]) for your infusion.  The following information is a summary of our appointment as well as important reminders.      Patient Education     Solu-Medrol Injection 500 mg  Uses  This medicine is used for the following purposes:    allergic reaction    autoimmune disorder    blood disorder    diagnostic test    endocrine disorder    inflammatory disease    immune suppression    cancer    COVID-19 (coronavirus)  Instructions  Carefully follow the instructions for preparing this medicine before injection.  Read and make sure you understand the instructions for measuring your dose and using the syringe before using this medicine.  The medicine needs to be mixed before being injected.  Do not dilute the medicine until ready to use.  If using the vial, do not remove the medicine from the vial until ready to use.  Always inspect the medicine before using.  Do not use the medicine if it contains any particles or if it has changed color.  Keep at room temperature until mixing. Refrigerate any extra mixed medicine until ready to use it.  Protect medicine from light.  Inject the medicine immediately after mixing.  Never use any medicine that has .  Ask your doctor, nurse or pharmacist to show you how to use this medicine correctly.  It is important that you keep taking each dose of this medicine on time even if you are feeling well.  If you miss a dose, contact your doctor for instructions.  Please tell your doctor and pharmacist about all the medicines you take. Include both prescription and over-the-counter medicines. Also tell them about any vitamins, herbal medicines, or anything else you take for your health.  If your symptoms do not improve or they worsen while on this medicine, contact your doctor.  Talk to your doctor before taking other medicines, including aspirins and ibuprofen containing  products. Speak to your doctor about which medicines are safe to use while you are on this medicine.  Do not suddenly stop taking this medicine. Check with your doctor before stopping.  This medicine may affect your blood sugar levels. If you have diabetes, talk to your doctor before changing the dose of your diabetes medicine.  This medicine may affect the strength of your bones. If you have or are at increased risk for developing osteoporosis (weakening of the bones), your doctor may recommend adding foods containing calcium and vitamin D while on this medicine. Please talk to your doctor for more information.  You may need vitamin and mineral supplements while on this medicine. Please speak with your doctor or pharmacist.  It is very important that you follow your doctor's instructions for all blood tests.  It is very important that you keep all appointments for medical exams and tests while on this medicine.  Cautions  Tell your doctor and pharmacist if you ever had an allergic reaction to a medicine. Symptoms of an allergic reaction can include trouble breathing, skin rash, itching, swelling, or severe dizziness.  Do not use the medication any more than instructed.  Please check with your doctor before drinking alcohol while on this medicine.  This medicine may reduce your body's ability to fight infections. Try to avoid contact with people with colds, flu or other infections.  Contact your doctor if you develop any signs of a new infection such as fever, cough, sore throat, or chills.  Wash your hands often and avoid close contact with people with infections such as colds and flu.  Speak with your health care provider before receiving any vaccinations.  Tell the doctor or pharmacist if you are pregnant, planning to be pregnant, or breastfeeding.  Do not take [place]'s wort while on this medicine.  Ask your pharmacist if this medicine can interact with any of your other medicines. Be sure to tell them about  all the medicines you take.  Please tell all your doctors and dentists that you are on this medicine before they provide care.  Do not start or stop any other medicines without first speaking to your doctor or pharmacist.  Used needles and syringes should be thrown away properly in a medical waste container. Ask your doctor or pharmacist if you need help.  Do not share this medicine with anyone who has not been prescribed this medicine.  Side Effects  The following is a list of some common side effects from this medicine. Please speak with your doctor about what you should do if you experience these or other side effects.    acne    agitated feeling or trouble sleeping    increased appetite    increased risk of bleeding    dizziness    headaches    high blood sugar    high blood pressure    increased risk for an infection    reaction at the area of the injection (pain, redness, swelling)    nausea    pain near injection site    stomach upset or abdominal pain    sweating    vomiting  Call your doctor or get medical help right away if you notice any of these more serious side effects:    severe or persistent bone, joint or jaw pain    unusual bruising or discoloration on skin    depression or feeling sad    swelling of the legs, feet, and hands    fever or chills    flu-like symptoms    hair growth    fast or irregular heart beats    symptoms of liver damage (such as yellowing of skin or eyes, dark urine, unusual tiredness or weakness; severe stomach or back pain)    menstruation changes (missed or fewer periods)    mood changes    muscle weakness    seizures    thinning of the skin    yeast infection of mouth    unusual or unexplained tiredness or weakness    vaginal itching or yeast infection    blurring or changes of vision    sudden or unexplained weight gain    slow wound healing  A few people may have an allergic reactions to this medicine. Symptoms can include difficulty breathing, skin rash, itching,  swelling, or severe dizziness. If you notice any of these symptoms, seek medical help quickly.  Extra  Please speak with your doctor, nurse, or pharmacist if you have any questions about this medicine.  [email or web address]  IMPORTANT NOTE: This document tells you briefly how to take your medicine, but it does not tell you all there is to know about it.Your doctor or pharmacist may give you other documents about your medicine. Please talk to them if you have any questions.Always follow their advice. There is a more complete description of this medicine available in English.Scan this code on your smartphone or tablet or use the web address below. You can also ask your pharmacist for a printout. If you have any questions, please ask your pharmacist.     2021 [employer].             We look forward in seeing you on your next appointment here at [facility] ([facility]).  Please don t hesitate to call us at [phone] to reschedule any of your appointments or to speak with one of the [facility] registered nurses.  It was a pleasure taking care of you today.    Sincerely,    [facility]  [facility]  [address] [place] [address]  [id number]  Phone:  [phone]

## 2021-03-27 NOTE — PROGRESS NOTES
"Nursing Note  Nicci Pemberton presents [facility] for:   Chief Complaint   Patient presents with     Infusion     Solumedrol     During [facility]'s [facility] appointment, orders from Dr. Amanda Orozco were completed.  Frequency: today is dose 1 of 3 total    Progress note:  Patient identification verified by name and date of birth.  Assessment completed.  Vitals recorded in Doc Flowsheets.  Patient was provided with education regarding medication/procedure and possible side effects.  Patient verbalized understanding.     present during visit today: Not Applicable.    Treatment Conditions: Non-applicable.    Premedications: were not ordered.    Drug Waste Record: No    Infusion length and rate:  infusion given over approximately 30 minutes    Labs: were drawn per orders.     Vascular access: peripheral IV placed today.    Is the next appt scheduled? yes  Asymptomatic COVID test completed? no    Post Infusion Assessment:  Patient tolerated infusion without incident.  Site patent and intact, free from redness, edema or discomfort.  No evidence of extravasations.  Access discontinued per protocol.     Discharge Plan:   Follow up plan of care with: ongoing infusions at [facility].  Discharge instructions were reviewed with patient.  Patient/representative verbalized understanding of discharge instructions and all questions answered.  Patient discharged from [facility] in stable condition.    Vee Milligan RN        Vital signs:  Temp: 97.8  F (36.6  C) Temp src: Oral BP: 122/77 Pulse: 78   Resp: 16            Estimated body mass index is 38.39 kg/m  as calculated from the following:    Height as of 2/24/21: 1.575 m (5' 2\").    Weight as of 2/24/21: 95.2 kg (209 lb 14.1 oz).        Administrations This Visit     methylPREDNISolone sodium succinate (solu-MEDROL) 500 mg in sodium chloride 0.9 % " 100 mL intermittent infusion     Admin Date  03/27/2021 Action  New Bag Dose  500 mg Rate  228 mL/hr Route  Intravenous Administered By  Vee Milligan, RN

## 2021-03-27 NOTE — LETTER
3/27/2021         RE: Nicci Pemberton  [address] [place] [address]        Dear Colleague,    Thank you for referring your patient, Nicci Pemberton, to the [facility] TREATMENT [facility]. Please see a copy of my visit note below.    Nursing Note  Nicci Pemberton presents [facility] for:   Chief Complaint   Patient presents with     Infusion     Solumedrol     During [facility]'s [facility] appointment, orders from Dr. Amanda Orozco were completed.  Frequency: today is dose 1 of 3 total    Progress note:  Patient identification verified by name and date of birth.  Assessment completed.  Vitals recorded in Doc Flowsheets.  Patient was provided with education regarding medication/procedure and possible side effects.  Patient verbalized understanding.     present during visit today: Not Applicable.    Treatment Conditions: Non-applicable.    Premedications: were not ordered.    Drug Waste Record: No    Infusion length and rate:  infusion given over approximately 30 minutes    Labs: were drawn per orders.     Vascular access: peripheral IV placed today.    Is the next appt scheduled? yes  Asymptomatic COVID test completed? no    Post Infusion Assessment:  Patient tolerated infusion without incident.  Site patent and intact, free from redness, edema or discomfort.  No evidence of extravasations.  Access discontinued per protocol.     Discharge Plan:   Follow up plan of care with: ongoing infusions at [facility].  Discharge instructions were reviewed with patient.  Patient/representative verbalized understanding of discharge instructions and all questions answered.  Patient discharged from [facility] in stable condition.    Vee Milligan RN        Vital signs:  Temp: 97.8  F (36.6  C) Temp src: Oral BP: 122/77 Pulse: 78   Resp: 16            Estimated body mass  "index is 38.39 kg/m  as calculated from the following:    Height as of 2/24/21: 1.575 m (5' 2\").    Weight as of 2/24/21: 95.2 kg (209 lb 14.1 oz).        Administrations This Visit     methylPREDNISolone sodium succinate (solu-MEDROL) 500 mg in sodium chloride 0.9 % 100 mL intermittent infusion     Admin Date  03/27/2021 Action  New Bag Dose  500 mg Rate  228 mL/hr Route  Intravenous Administered By  Vee Milligan RN                        Again, thank you for allowing me to participate in the care of your patient.        Sincerely,        [facility]    "

## 2021-03-28 ENCOUNTER — INFUSION THERAPY VISIT (OUTPATIENT)
Dept: INFUSION THERAPY | Facility: CLINIC | Age: 61
End: 2021-03-28
Attending: INTERNAL MEDICINE
Payer: COMMERCIAL

## 2021-03-28 VITALS
DIASTOLIC BLOOD PRESSURE: 75 MMHG | RESPIRATION RATE: 16 BRPM | SYSTOLIC BLOOD PRESSURE: 121 MMHG | TEMPERATURE: 98.7 F | HEART RATE: 83 BPM | OXYGEN SATURATION: 99 %

## 2021-03-28 DIAGNOSIS — T86.41 LIVER TRANSPLANT REJECTION (H): Primary | ICD-10-CM

## 2021-03-28 LAB
ALBUMIN SERPL-MCNC: 3.4 G/DL (ref 3.4–5)
ALP SERPL-CCNC: 180 U/L (ref 40–150)
ALT SERPL W P-5'-P-CCNC: 64 U/L (ref 0–50)
AST SERPL W P-5'-P-CCNC: 50 U/L (ref 0–45)
BILIRUB DIRECT SERPL-MCNC: 0.2 MG/DL (ref 0–0.2)
BILIRUB SERPL-MCNC: 1.1 MG/DL (ref 0.2–1.3)
GLUCOSE BLDC GLUCOMTR-MCNC: 94 MG/DL (ref 70–99)
PROT SERPL-MCNC: 7.6 G/DL (ref 6.8–8.8)

## 2021-03-28 PROCEDURE — 258N000003 HC RX IP 258 OP 636: Performed by: STUDENT IN AN ORGANIZED HEALTH CARE EDUCATION/TRAINING PROGRAM

## 2021-03-28 PROCEDURE — 250N000011 HC RX IP 250 OP 636: Performed by: STUDENT IN AN ORGANIZED HEALTH CARE EDUCATION/TRAINING PROGRAM

## 2021-03-28 PROCEDURE — 96365 THER/PROPH/DIAG IV INF INIT: CPT

## 2021-03-28 PROCEDURE — 80076 HEPATIC FUNCTION PANEL: CPT | Performed by: STUDENT IN AN ORGANIZED HEALTH CARE EDUCATION/TRAINING PROGRAM

## 2021-03-28 PROCEDURE — 82962 GLUCOSE BLOOD TEST: CPT

## 2021-03-28 RX ADMIN — SODIUM CHLORIDE 500 MG: 9 INJECTION, SOLUTION INTRAVENOUS at 09:46

## 2021-03-28 NOTE — PROGRESS NOTES
Nursing Note  Nicci Pemberton presents [facility] for:   Chief Complaint   Patient presents with     Infusion     solu-medrol     During today's [facility] appointment, orders from Dr. Amanda Orozco were completed.  Frequency: today is dose 2 of 3 total    Progress note:  Patient identification verified by name and date of birth.  Assessment completed.  Vitals recorded in Doc Flowsheets.  Patient was provided with education regarding medication/procedure and possible side effects.  Patient verbalized understanding.     present during visit today: Not Applicable.    Treatment Conditions: Non-applicable.    Premedications: were not ordered.    Drug Waste Record: No    Infusion length and rate:  infusion given over approximately 30 minutes    Labs: were drawn per orders. BG 94    Vascular access: peripheral IV placed today.    Is the next appt scheduled? yes  Asymptomatic COVID test completed? no    Post Infusion Assessment:  Patient tolerated infusion without incident.  Site patent and intact, free from redness, edema or discomfort.  No evidence of extravasations.  Access discontinued per protocol.     Discharge Plan:   Follow up plan of care with: ongoing infusions at [facility].  Discharge instructions were reviewed with patient.  Patient/representative verbalized understanding of discharge instructions and all questions answered.  Patient discharged from [facility] in stable condition.    Fior Wilkins RN    Administrations This Visit     methylPREDNISolone sodium succinate (solu-MEDROL) 500 mg in sodium chloride 0.9 % 100 mL intermittent infusion     Admin Date  03/28/2021 Action  New Bag Dose  500 mg Rate  228 mL/hr Route  Intravenous Administered By  Fior Wilkins RN                Vital signs:  Temp: 98.7  F (37.1  C) Temp src: Oral BP: 121/75 Pulse: 83   Resp: 16 SpO2:  "99 %          Estimated body mass index is 38.39 kg/m  as calculated from the following:    Height as of 2/24/21: 1.575 m (5' 2\").    Weight as of 2/24/21: 95.2 kg (209 lb 14.1 oz).              "

## 2021-03-28 NOTE — LETTER
3/28/2021         RE: Nicci Pemberton  [address] [place] [address]        Dear Colleague,    Thank you for referring your patient, Nicci Pemberton, to the [facility] TREATMENT [facility]. Please see a copy of my visit note below.    Nursing Note  Nicci Pemberton presents [facility] for:   Chief Complaint   Patient presents with     Infusion     solu-medrol     During today's [facility] appointment, orders from Dr. Amanda Orozco were completed.  Frequency: today is dose 2 of 3 total    Progress note:  Patient identification verified by name and date of birth.  Assessment completed.  Vitals recorded in Doc Flowsheets.  Patient was provided with education regarding medication/procedure and possible side effects.  Patient verbalized understanding.     present during visit today: Not Applicable.    Treatment Conditions: Non-applicable.    Premedications: were not ordered.    Drug Waste Record: No    Infusion length and rate:  infusion given over approximately 30 minutes    Labs: were drawn per orders. BG 94    Vascular access: peripheral IV placed today.    Is the next appt scheduled? yes  Asymptomatic COVID test completed? no    Post Infusion Assessment:  Patient tolerated infusion without incident.  Site patent and intact, free from redness, edema or discomfort.  No evidence of extravasations.  Access discontinued per protocol.     Discharge Plan:   Follow up plan of care with: ongoing infusions at [facility].  Discharge instructions were reviewed with patient.  Patient/representative verbalized understanding of discharge instructions and all questions answered.  Patient discharged from [facility] in stable condition.    Fior Way RN    Administrations This Visit     methylPREDNISolone sodium succinate (solu-MEDROL) 500 mg in sodium chloride  "0.9 % 100 mL intermittent infusion     Admin Date  03/28/2021 Action  New Bag Dose  500 mg Rate  228 mL/hr Route  Intravenous Administered By  Fior Wilkins RN                Vital signs:  Temp: 98.7  F (37.1  C) Temp src: Oral BP: 121/75 Pulse: 83   Resp: 16 SpO2: 99 %          Estimated body mass index is 38.39 kg/m  as calculated from the following:    Height as of 2/24/21: 1.575 m (5' 2\").    Weight as of 2/24/21: 95.2 kg (209 lb 14.1 oz).                  Again, thank you for allowing me to participate in the care of your patient.        Sincerely,        [facility]    "

## 2021-03-29 ENCOUNTER — TELEPHONE (OUTPATIENT)
Dept: TRANSPLANT | Facility: CLINIC | Age: 61
End: 2021-03-29

## 2021-03-29 ENCOUNTER — INFUSION THERAPY VISIT (OUTPATIENT)
Dept: INFUSION THERAPY | Facility: CLINIC | Age: 61
End: 2021-03-29
Attending: INTERNAL MEDICINE
Payer: COMMERCIAL

## 2021-03-29 VITALS
OXYGEN SATURATION: 100 % | RESPIRATION RATE: 16 BRPM | DIASTOLIC BLOOD PRESSURE: 80 MMHG | HEART RATE: 88 BPM | SYSTOLIC BLOOD PRESSURE: 123 MMHG | TEMPERATURE: 98 F

## 2021-03-29 DIAGNOSIS — T86.41 LIVER TRANSPLANT REJECTION (H): Primary | ICD-10-CM

## 2021-03-29 LAB
ALBUMIN SERPL-MCNC: 3.3 G/DL (ref 3.4–5)
ALP SERPL-CCNC: 157 U/L (ref 40–150)
ALT SERPL W P-5'-P-CCNC: 55 U/L (ref 0–50)
ANION GAP SERPL CALCULATED.3IONS-SCNC: 4 MMOL/L (ref 3–14)
AST SERPL W P-5'-P-CCNC: 34 U/L (ref 0–45)
BASOPHILS # BLD AUTO: 0 10E9/L (ref 0–0.2)
BASOPHILS NFR BLD AUTO: 0.1 %
BILIRUB DIRECT SERPL-MCNC: 0.2 MG/DL (ref 0–0.2)
BILIRUB SERPL-MCNC: 0.8 MG/DL (ref 0.2–1.3)
BUN SERPL-MCNC: 35 MG/DL (ref 7–30)
CALCIUM SERPL-MCNC: 9.5 MG/DL (ref 8.5–10.1)
CHLORIDE SERPL-SCNC: 109 MMOL/L (ref 94–109)
CO2 SERPL-SCNC: 26 MMOL/L (ref 20–32)
CREAT SERPL-MCNC: 0.93 MG/DL (ref 0.52–1.04)
CYCLOSPORINE BLD LC/MS/MS-MCNC: 103 UG/L (ref 50–400)
DIFFERENTIAL METHOD BLD: ABNORMAL
EOSINOPHIL # BLD AUTO: 0 10E9/L (ref 0–0.7)
EOSINOPHIL NFR BLD AUTO: 0 %
ERYTHROCYTE [DISTWIDTH] IN BLOOD BY AUTOMATED COUNT: 12.4 % (ref 10–15)
GFR SERPL CREATININE-BSD FRML MDRD: 66 ML/MIN/{1.73_M2}
GLUCOSE BLDC GLUCOMTR-MCNC: 121 MG/DL (ref 70–99)
GLUCOSE SERPL-MCNC: 126 MG/DL (ref 70–99)
HCT VFR BLD AUTO: 36.5 % (ref 35–47)
HGB BLD-MCNC: 12.3 G/DL (ref 11.7–15.7)
IMM GRANULOCYTES # BLD: 0.3 10E9/L (ref 0–0.4)
IMM GRANULOCYTES NFR BLD: 1.7 %
LYMPHOCYTES # BLD AUTO: 0.8 10E9/L (ref 0.8–5.3)
LYMPHOCYTES NFR BLD AUTO: 4.3 %
MCH RBC QN AUTO: 33 PG (ref 26.5–33)
MCHC RBC AUTO-ENTMCNC: 33.7 G/DL (ref 31.5–36.5)
MCV RBC AUTO: 98 FL (ref 78–100)
MONOCYTES # BLD AUTO: 1.3 10E9/L (ref 0–1.3)
MONOCYTES NFR BLD AUTO: 7.1 %
NEUTROPHILS # BLD AUTO: 16 10E9/L (ref 1.6–8.3)
NEUTROPHILS NFR BLD AUTO: 86.8 %
NRBC # BLD AUTO: 0 10*3/UL
NRBC BLD AUTO-RTO: 0 /100
PLATELET # BLD AUTO: 155 10E9/L (ref 150–450)
POTASSIUM SERPL-SCNC: 4.1 MMOL/L (ref 3.4–5.3)
PROT SERPL-MCNC: 7.3 G/DL (ref 6.8–8.8)
RBC # BLD AUTO: 3.73 10E12/L (ref 3.8–5.2)
SODIUM SERPL-SCNC: 139 MMOL/L (ref 133–144)
TME LAST DOSE: NORMAL H
WBC # BLD AUTO: 18.4 10E9/L (ref 4–11)

## 2021-03-29 PROCEDURE — 96365 THER/PROPH/DIAG IV INF INIT: CPT

## 2021-03-29 PROCEDURE — 80158 DRUG ASSAY CYCLOSPORINE: CPT | Performed by: STUDENT IN AN ORGANIZED HEALTH CARE EDUCATION/TRAINING PROGRAM

## 2021-03-29 PROCEDURE — 250N000011 HC RX IP 250 OP 636: Performed by: STUDENT IN AN ORGANIZED HEALTH CARE EDUCATION/TRAINING PROGRAM

## 2021-03-29 PROCEDURE — 80048 BASIC METABOLIC PNL TOTAL CA: CPT | Performed by: STUDENT IN AN ORGANIZED HEALTH CARE EDUCATION/TRAINING PROGRAM

## 2021-03-29 PROCEDURE — 258N000003 HC RX IP 258 OP 636: Performed by: STUDENT IN AN ORGANIZED HEALTH CARE EDUCATION/TRAINING PROGRAM

## 2021-03-29 PROCEDURE — 82962 GLUCOSE BLOOD TEST: CPT

## 2021-03-29 PROCEDURE — 85025 COMPLETE CBC W/AUTO DIFF WBC: CPT | Performed by: STUDENT IN AN ORGANIZED HEALTH CARE EDUCATION/TRAINING PROGRAM

## 2021-03-29 PROCEDURE — 80076 HEPATIC FUNCTION PANEL: CPT | Performed by: STUDENT IN AN ORGANIZED HEALTH CARE EDUCATION/TRAINING PROGRAM

## 2021-03-29 RX ORDER — PREDNISONE 20 MG/1
TABLET ORAL
Qty: 35 TABLET | Refills: 0 | COMMUNITY
Start: 2021-03-29 | End: 2021-06-25

## 2021-03-29 RX ORDER — PREDNISONE 20 MG/1
TABLET ORAL
Qty: 35 TABLET | Refills: 0 | Status: SHIPPED | OUTPATIENT
Start: 2021-03-29 | End: 2021-03-29

## 2021-03-29 RX ORDER — PREDNISONE 10 MG/1
TABLET ORAL
Qty: 85 TABLET | Refills: 0 | Status: SHIPPED | OUTPATIENT
Start: 2021-03-29 | End: 2021-03-29

## 2021-03-29 RX ORDER — MYCOPHENOLATE MOFETIL 250 MG/1
250 CAPSULE ORAL 2 TIMES DAILY
Qty: 60 CAPSULE | Refills: 3 | Status: SHIPPED | OUTPATIENT
Start: 2021-03-29 | End: 2021-07-28

## 2021-03-29 RX ORDER — MYCOPHENOLATE MOFETIL 250 MG/1
250 CAPSULE ORAL 2 TIMES DAILY
Qty: 60 CAPSULE | Refills: 3 | Status: SHIPPED | OUTPATIENT
Start: 2021-03-29 | End: 2021-03-29

## 2021-03-29 RX ORDER — PREDNISONE 5 MG/1
5 TABLET ORAL DAILY
Qty: 30 TABLET | Refills: 3 | Status: SHIPPED | OUTPATIENT
Start: 2021-03-29 | End: 2021-06-25

## 2021-03-29 RX ADMIN — SODIUM CHLORIDE 500 MG: 9 INJECTION, SOLUTION INTRAVENOUS at 10:42

## 2021-03-29 ASSESSMENT — PAIN SCALES - GENERAL: PAINLEVEL: MODERATE PAIN (4)

## 2021-03-29 NOTE — TELEPHONE ENCOUNTER
Pt to be on prednisone taper 40 mg daily x 7 days, 30 mg daily x 7 weeks, 20 mg daily x 7 days, 10 mg daily x 7 days, 5 mg daily indefinitely.     Pt to start cellcept 250 mg BID.     Pt to do weekly labs. Pt aware and will call with any changes or concerns.

## 2021-03-29 NOTE — PROGRESS NOTES
Nursing Note  Nicci Pemberton presents [facility] for:   Chief Complaint   Patient presents with     Infusion     solumedrol     During today's [facility] appointment, orders from Thomas Michael Leventhal MD were completed.  Frequency: today is dose 3 of 3 total    Progress note:  Patient identification verified by name and date of birth.  Assessment completed.  Vitals recorded in Doc Flowsheets.  Patient was provided with education regarding medication/procedure and possible side effects.  Patient verbalized understanding.     present during visit today: Not Applicable.    Treatment Conditions: Non-applicable.    Premedications: were not ordered.    Drug Waste Record: No    Infusion length and rate:  infusion given over approximately 30 minutes    Labs: were drawn per orders.       Vascular access: peripheral IV placed today.    Is the next appt scheduled? N/A  Asymptomatic COVID test completed? 3/22/2021    Post Infusion Assessment:  Patient tolerated infusion without incident.  Blood return noted pre and post infusion.  Site patent and intact, free from redness, edema or discomfort.  No evidence of extravasations.  Access discontinued per protocol.     Discharge Plan:   Follow up plan of care with: ordering provider as scheduled.  Discharge instructions were reviewed with patient.  Patient/representative verbalized understanding of discharge instructions and all questions answered.  Patient discharged from [facility] in stable condition.    Amanda Irvin RN    Administrations This Visit     methylPREDNISolone sodium succinate (solu-MEDROL) 500 mg in sodium chloride 0.9 % 100 mL intermittent infusion     Admin Date  03/29/2021 Action  New Bag Dose  500 mg Rate  236 mL/hr Route  Intravenous Administered By  Amanda Irvin RN                /79   Pulse 90   Temp 98  F (36.7  C) (Oral)   SpO2  100%

## 2021-03-29 NOTE — LETTER
3/29/2021         RE: Nicci Pemberton  [address] [place] [address]        Dear Colleague,    Thank you for referring your patient, Nicci Pemberton, to the [facility] TREATMENT [facility]. Please see a copy of my visit note below.    Nursing Note  Nicci Pemberton presents [facility] for:   Chief Complaint   Patient presents with     Infusion     solumedrol     During today's [facility] appointment, orders from Thomas Michael Leventhal MD were completed.  Frequency: today is dose 3 of 3 total    Progress note:  Patient identification verified by name and date of birth.  Assessment completed.  Vitals recorded in Doc Flowsheets.  Patient was provided with education regarding medication/procedure and possible side effects.  Patient verbalized understanding.     present during visit today: Not Applicable.    Treatment Conditions: Non-applicable.    Premedications: were not ordered.    Drug Waste Record: No    Infusion length and rate:  infusion given over approximately 30 minutes    Labs: were drawn per orders.       Vascular access: peripheral IV placed today.    Is the next appt scheduled? N/A  Asymptomatic COVID test completed? 3/22/2021    Post Infusion Assessment:  Patient tolerated infusion without incident.  Blood return noted pre and post infusion.  Site patent and intact, free from redness, edema or discomfort.  No evidence of extravasations.  Access discontinued per protocol.     Discharge Plan:   Follow up plan of care with: ordering provider as scheduled.  Discharge instructions were reviewed with patient.  Patient/representative verbalized understanding of discharge instructions and all questions answered.  Patient discharged from [facility] in stable condition.    Amanda Irvin RN    Administrations This Visit     methylPREDNISolone sodium succinate  (solu-MEDROL) 500 mg in sodium chloride 0.9 % 100 mL intermittent infusion     Admin Date  03/29/2021 Action  New Bag Dose  500 mg Rate  236 mL/hr Route  Intravenous Administered By  Amanda Irvin RN                /79   Pulse 90   Temp 98  F (36.7  C) (Oral)   SpO2 100%           Again, thank you for allowing me to participate in the care of your patient.        Sincerely,        [facility]

## 2021-04-01 ENCOUNTER — TELEPHONE (OUTPATIENT)
Dept: TRANSPLANT | Facility: CLINIC | Age: 61
End: 2021-04-01

## 2021-04-01 NOTE — TELEPHONE ENCOUNTER
Spoke with Nicci. She is feeling ok.she does not like the puffiness from prednisone. She is staying active. She did have a soft stool. If patient has diarrhea or any nausea she will call tx office.

## 2021-04-02 PROBLEM — K83.1 BILIARY STRICTURE (H): Status: ACTIVE | Noted: 2021-04-02

## 2021-04-05 LAB — COPATH REPORT: NORMAL

## 2021-04-06 DIAGNOSIS — Z94.4 STATUS POST LIVER TRANSPLANTATION (H): ICD-10-CM

## 2021-04-06 DIAGNOSIS — Z94.4 LIVER REPLACED BY TRANSPLANT (H): ICD-10-CM

## 2021-04-06 DIAGNOSIS — T86.41 LIVER TRANSPLANT REJECTION (H): ICD-10-CM

## 2021-04-06 DIAGNOSIS — Z13.220 LIPID SCREENING: ICD-10-CM

## 2021-04-06 LAB
ALBUMIN SERPL-MCNC: 3.3 G/DL (ref 3.4–5)
ALP SERPL-CCNC: 118 U/L (ref 40–150)
ALT SERPL W P-5'-P-CCNC: 44 U/L (ref 0–50)
ANION GAP SERPL CALCULATED.3IONS-SCNC: 7 MMOL/L (ref 3–14)
AST SERPL W P-5'-P-CCNC: 30 U/L (ref 0–45)
BILIRUB DIRECT SERPL-MCNC: 0.2 MG/DL (ref 0–0.2)
BILIRUB SERPL-MCNC: 1.4 MG/DL (ref 0.2–1.3)
BUN SERPL-MCNC: 29 MG/DL (ref 7–30)
CALCIUM SERPL-MCNC: 9 MG/DL (ref 8.5–10.1)
CHLORIDE SERPL-SCNC: 105 MMOL/L (ref 94–109)
CO2 SERPL-SCNC: 29 MMOL/L (ref 20–32)
CREAT SERPL-MCNC: 1 MG/DL (ref 0.52–1.04)
CYCLOSPORINE BLD LC/MS/MS-MCNC: 89 UG/L (ref 50–400)
ERYTHROCYTE [DISTWIDTH] IN BLOOD BY AUTOMATED COUNT: 12.3 % (ref 10–15)
GFR SERPL CREATININE-BSD FRML MDRD: 61 ML/MIN/{1.73_M2}
GLUCOSE SERPL-MCNC: 100 MG/DL (ref 70–99)
HCT VFR BLD AUTO: 40.8 % (ref 35–47)
HGB BLD-MCNC: 13.3 G/DL (ref 11.7–15.7)
MCH RBC QN AUTO: 32.7 PG (ref 26.5–33)
MCHC RBC AUTO-ENTMCNC: 32.6 G/DL (ref 31.5–36.5)
MCV RBC AUTO: 100 FL (ref 78–100)
PLATELET # BLD AUTO: 155 10E9/L (ref 150–450)
POTASSIUM SERPL-SCNC: 3.6 MMOL/L (ref 3.4–5.3)
PROT SERPL-MCNC: 7.1 G/DL (ref 6.8–8.8)
RBC # BLD AUTO: 4.07 10E12/L (ref 3.8–5.2)
SODIUM SERPL-SCNC: 141 MMOL/L (ref 133–144)
TME LAST DOSE: 2200 H
WBC # BLD AUTO: 9.4 10E9/L (ref 4–11)

## 2021-04-06 PROCEDURE — 80158 DRUG ASSAY CYCLOSPORINE: CPT | Performed by: INTERNAL MEDICINE

## 2021-04-06 PROCEDURE — 80076 HEPATIC FUNCTION PANEL: CPT | Performed by: INTERNAL MEDICINE

## 2021-04-06 PROCEDURE — 36415 COLL VENOUS BLD VENIPUNCTURE: CPT | Performed by: INTERNAL MEDICINE

## 2021-04-06 PROCEDURE — 80048 BASIC METABOLIC PNL TOTAL CA: CPT | Performed by: INTERNAL MEDICINE

## 2021-04-06 PROCEDURE — 85027 COMPLETE CBC AUTOMATED: CPT | Performed by: INTERNAL MEDICINE

## 2021-04-08 DIAGNOSIS — Z94.4 LIVER REPLACED BY TRANSPLANT (H): Primary | ICD-10-CM

## 2021-04-08 RX ORDER — CYCLOSPORINE 25 MG/1
100 CAPSULE, LIQUID FILLED ORAL EVERY 12 HOURS
Qty: 720 CAPSULE | Refills: 3 | Status: SHIPPED | OUTPATIENT
Start: 2021-04-08 | End: 2021-04-13

## 2021-04-08 NOTE — TELEPHONE ENCOUNTER
ISSUE:   Cyclosporine level 89 on 4/6, goal 100-150 Per Leventhal due to recent rejection, dose 75 mg BID.    PLAN:   Please call patient and confirm this was an accurate 12-hour trough. Verify Cyclosporine dose.Confirm no new medications or illness. Confirm no missed doses. If accurate trough and accurate dose, increase Cyclosporine dose to 100 mg BID and repeat labs on Monday.  Zamzam Erickson RN    OUTCOME:   Spoke with patient, they confirm accurate trough level and current dose 75 mg BID. Patient confirmed dose change to 100 mg BID and to repeat labs on Monday. Orders sent to preferred pharmacy for dose change and lab for repeat labs. Patient voiced understanding of plan.    Lauren Zuluaga LPN

## 2021-04-11 DIAGNOSIS — T86.41 LIVER TRANSPLANT REJECTION (H): ICD-10-CM

## 2021-04-11 LAB
ALBUMIN SERPL-MCNC: 3.4 G/DL (ref 3.4–5)
ALP SERPL-CCNC: 112 U/L (ref 40–150)
ALT SERPL W P-5'-P-CCNC: 46 U/L (ref 0–50)
ANION GAP SERPL CALCULATED.3IONS-SCNC: 3 MMOL/L (ref 3–14)
AST SERPL W P-5'-P-CCNC: 30 U/L (ref 0–45)
BILIRUB DIRECT SERPL-MCNC: 0.4 MG/DL (ref 0–0.2)
BILIRUB SERPL-MCNC: 1.6 MG/DL (ref 0.2–1.3)
BUN SERPL-MCNC: 28 MG/DL (ref 7–30)
CALCIUM SERPL-MCNC: 9.3 MG/DL (ref 8.5–10.1)
CHLORIDE SERPL-SCNC: 103 MMOL/L (ref 94–109)
CO2 SERPL-SCNC: 31 MMOL/L (ref 20–32)
CREAT SERPL-MCNC: 1.02 MG/DL (ref 0.52–1.04)
ERYTHROCYTE [DISTWIDTH] IN BLOOD BY AUTOMATED COUNT: 12.9 % (ref 10–15)
GFR SERPL CREATININE-BSD FRML MDRD: 59 ML/MIN/{1.73_M2}
GLUCOSE SERPL-MCNC: 86 MG/DL (ref 70–99)
HCT VFR BLD AUTO: 41 % (ref 35–47)
HGB BLD-MCNC: 13.2 G/DL (ref 11.7–15.7)
MCH RBC QN AUTO: 32 PG (ref 26.5–33)
MCHC RBC AUTO-ENTMCNC: 32.2 G/DL (ref 31.5–36.5)
MCV RBC AUTO: 99 FL (ref 78–100)
PLATELET # BLD AUTO: 142 10E9/L (ref 150–450)
POTASSIUM SERPL-SCNC: 5 MMOL/L (ref 3.4–5.3)
PROT SERPL-MCNC: 7.1 G/DL (ref 6.8–8.8)
RBC # BLD AUTO: 4.13 10E12/L (ref 3.8–5.2)
SODIUM SERPL-SCNC: 137 MMOL/L (ref 133–144)
WBC # BLD AUTO: 7.9 10E9/L (ref 4–11)

## 2021-04-11 PROCEDURE — 85027 COMPLETE CBC AUTOMATED: CPT | Performed by: INTERNAL MEDICINE

## 2021-04-11 PROCEDURE — 80048 BASIC METABOLIC PNL TOTAL CA: CPT | Performed by: INTERNAL MEDICINE

## 2021-04-11 PROCEDURE — 80076 HEPATIC FUNCTION PANEL: CPT | Performed by: INTERNAL MEDICINE

## 2021-04-11 PROCEDURE — 80158 DRUG ASSAY CYCLOSPORINE: CPT | Performed by: INTERNAL MEDICINE

## 2021-04-11 PROCEDURE — 36415 COLL VENOUS BLD VENIPUNCTURE: CPT | Performed by: INTERNAL MEDICINE

## 2021-04-12 LAB
CYCLOSPORINE BLD LC/MS/MS-MCNC: 122 UG/L (ref 50–400)
TME LAST DOSE: NORMAL H

## 2021-04-13 ENCOUNTER — PATIENT OUTREACH (OUTPATIENT)
Dept: GASTROENTEROLOGY | Facility: CLINIC | Age: 61
End: 2021-04-13

## 2021-04-13 DIAGNOSIS — Z94.4 LIVER REPLACED BY TRANSPLANT (H): ICD-10-CM

## 2021-04-13 RX ORDER — CYCLOSPORINE 25 MG/1
100 CAPSULE, LIQUID FILLED ORAL EVERY 12 HOURS
Qty: 720 CAPSULE | Refills: 3 | Status: SHIPPED | OUTPATIENT
Start: 2021-04-13 | End: 2021-05-27

## 2021-04-15 ENCOUNTER — IMMUNIZATION (OUTPATIENT)
Dept: FAMILY MEDICINE | Facility: CLINIC | Age: 61
End: 2021-04-15
Attending: FAMILY MEDICINE
Payer: COMMERCIAL

## 2021-04-15 PROCEDURE — 0012A PR COVID VAC MODERNA 100 MCG/0.5 ML IM: CPT

## 2021-04-15 PROCEDURE — 91301 PR COVID VAC MODERNA 100 MCG/0.5 ML IM: CPT

## 2021-04-18 DIAGNOSIS — T86.41 LIVER TRANSPLANT REJECTION (H): ICD-10-CM

## 2021-04-18 DIAGNOSIS — Z94.4 STATUS POST LIVER TRANSPLANTATION (H): ICD-10-CM

## 2021-04-18 LAB
ALBUMIN SERPL-MCNC: 3.4 G/DL (ref 3.4–5)
ALP SERPL-CCNC: 99 U/L (ref 40–150)
ALT SERPL W P-5'-P-CCNC: 32 U/L (ref 0–50)
ANION GAP SERPL CALCULATED.3IONS-SCNC: 3 MMOL/L (ref 3–14)
AST SERPL W P-5'-P-CCNC: 22 U/L (ref 0–45)
BILIRUB DIRECT SERPL-MCNC: 0.3 MG/DL (ref 0–0.2)
BILIRUB SERPL-MCNC: 1.6 MG/DL (ref 0.2–1.3)
BUN SERPL-MCNC: 29 MG/DL (ref 7–30)
CALCIUM SERPL-MCNC: 9.1 MG/DL (ref 8.5–10.1)
CHLORIDE SERPL-SCNC: 104 MMOL/L (ref 94–109)
CO2 SERPL-SCNC: 31 MMOL/L (ref 20–32)
CREAT SERPL-MCNC: 1.18 MG/DL (ref 0.52–1.04)
ERYTHROCYTE [DISTWIDTH] IN BLOOD BY AUTOMATED COUNT: 13.1 % (ref 10–15)
GFR SERPL CREATININE-BSD FRML MDRD: 50 ML/MIN/{1.73_M2}
GLUCOSE SERPL-MCNC: 80 MG/DL (ref 70–99)
HCT VFR BLD AUTO: 40.8 % (ref 35–47)
HGB BLD-MCNC: 13.3 G/DL (ref 11.7–15.7)
MCH RBC QN AUTO: 32.4 PG (ref 26.5–33)
MCHC RBC AUTO-ENTMCNC: 32.6 G/DL (ref 31.5–36.5)
MCV RBC AUTO: 99 FL (ref 78–100)
PLATELET # BLD AUTO: 141 10E9/L (ref 150–450)
POTASSIUM SERPL-SCNC: 4.7 MMOL/L (ref 3.4–5.3)
PROT SERPL-MCNC: 6.9 G/DL (ref 6.8–8.8)
RBC # BLD AUTO: 4.11 10E12/L (ref 3.8–5.2)
SODIUM SERPL-SCNC: 138 MMOL/L (ref 133–144)
WBC # BLD AUTO: 6.2 10E9/L (ref 4–11)

## 2021-04-18 PROCEDURE — 80048 BASIC METABOLIC PNL TOTAL CA: CPT | Performed by: INTERNAL MEDICINE

## 2021-04-18 PROCEDURE — 85027 COMPLETE CBC AUTOMATED: CPT | Performed by: INTERNAL MEDICINE

## 2021-04-18 PROCEDURE — 80076 HEPATIC FUNCTION PANEL: CPT | Performed by: INTERNAL MEDICINE

## 2021-04-18 PROCEDURE — 36415 COLL VENOUS BLD VENIPUNCTURE: CPT | Performed by: INTERNAL MEDICINE

## 2021-04-18 PROCEDURE — 80158 DRUG ASSAY CYCLOSPORINE: CPT | Performed by: INTERNAL MEDICINE

## 2021-04-19 ENCOUNTER — TELEPHONE (OUTPATIENT)
Dept: GASTROENTEROLOGY | Facility: CLINIC | Age: 61
End: 2021-04-19

## 2021-04-19 DIAGNOSIS — Z11.59 ENCOUNTER FOR SCREENING FOR OTHER VIRAL DISEASES: Primary | ICD-10-CM

## 2021-04-19 LAB
CYCLOSPORINE BLD LC/MS/MS-MCNC: 135 UG/L (ref 50–400)
TME LAST DOSE: NORMAL H

## 2021-04-19 NOTE — TELEPHONE ENCOUNTER
[facility] Call Center    Phone Message    May a detailed message be left on voicemail: yes     Reason for Call: Other: Patient called to see if the Covid test order for the 5/12/21 ERCP could be put in EPIC so she could get that scheduled now, so that she does not have any problems.  Please follow up with patient, on her cell number, when order is in EPIC.  Thank you!     Action Taken: Message routed to:  [facility] Team     Travel Screening: Not Applicable

## 2021-05-03 DIAGNOSIS — Z94.4 STATUS POST LIVER TRANSPLANTATION (H): ICD-10-CM

## 2021-05-03 DIAGNOSIS — T86.41 LIVER TRANSPLANT REJECTION (H): ICD-10-CM

## 2021-05-03 LAB
ALBUMIN SERPL-MCNC: 3.6 G/DL (ref 3.4–5)
ALP SERPL-CCNC: 108 U/L (ref 40–150)
ALT SERPL W P-5'-P-CCNC: 26 U/L (ref 0–50)
ANION GAP SERPL CALCULATED.3IONS-SCNC: 6 MMOL/L (ref 3–14)
AST SERPL W P-5'-P-CCNC: 23 U/L (ref 0–45)
BILIRUB DIRECT SERPL-MCNC: 0.3 MG/DL (ref 0–0.2)
BILIRUB SERPL-MCNC: 2 MG/DL (ref 0.2–1.3)
BUN SERPL-MCNC: 29 MG/DL (ref 7–30)
CALCIUM SERPL-MCNC: 9.5 MG/DL (ref 8.5–10.1)
CHLORIDE SERPL-SCNC: 107 MMOL/L (ref 94–109)
CO2 SERPL-SCNC: 28 MMOL/L (ref 20–32)
CREAT SERPL-MCNC: 1.14 MG/DL (ref 0.52–1.04)
ERYTHROCYTE [DISTWIDTH] IN BLOOD BY AUTOMATED COUNT: 12.9 % (ref 10–15)
GFR SERPL CREATININE-BSD FRML MDRD: 52 ML/MIN/{1.73_M2}
GLUCOSE SERPL-MCNC: 83 MG/DL (ref 70–99)
HCT VFR BLD AUTO: 40.5 % (ref 35–47)
HGB BLD-MCNC: 13.3 G/DL (ref 11.7–15.7)
MCH RBC QN AUTO: 33.4 PG (ref 26.5–33)
MCHC RBC AUTO-ENTMCNC: 32.8 G/DL (ref 31.5–36.5)
MCV RBC AUTO: 102 FL (ref 78–100)
PLATELET # BLD AUTO: 132 10E9/L (ref 150–450)
POTASSIUM SERPL-SCNC: 4.6 MMOL/L (ref 3.4–5.3)
PROT SERPL-MCNC: 7.2 G/DL (ref 6.8–8.8)
RBC # BLD AUTO: 3.98 10E12/L (ref 3.8–5.2)
SODIUM SERPL-SCNC: 141 MMOL/L (ref 133–144)
WBC # BLD AUTO: 6.4 10E9/L (ref 4–11)

## 2021-05-03 PROCEDURE — 36415 COLL VENOUS BLD VENIPUNCTURE: CPT | Performed by: INTERNAL MEDICINE

## 2021-05-03 PROCEDURE — 85027 COMPLETE CBC AUTOMATED: CPT | Performed by: INTERNAL MEDICINE

## 2021-05-03 PROCEDURE — 80158 DRUG ASSAY CYCLOSPORINE: CPT | Performed by: INTERNAL MEDICINE

## 2021-05-03 PROCEDURE — 80076 HEPATIC FUNCTION PANEL: CPT | Performed by: INTERNAL MEDICINE

## 2021-05-03 PROCEDURE — 80048 BASIC METABOLIC PNL TOTAL CA: CPT | Performed by: INTERNAL MEDICINE

## 2021-05-04 ENCOUNTER — ANCILLARY PROCEDURE (OUTPATIENT)
Dept: CT IMAGING | Facility: CLINIC | Age: 61
End: 2021-05-04
Attending: INTERNAL MEDICINE
Payer: COMMERCIAL

## 2021-05-04 ENCOUNTER — TELEPHONE (OUTPATIENT)
Dept: TRANSPLANT | Facility: CLINIC | Age: 61
End: 2021-05-04

## 2021-05-04 DIAGNOSIS — K57.32 DIVERTICULITIS OF COLON: ICD-10-CM

## 2021-05-04 DIAGNOSIS — Z94.4 LIVER REPLACED BY TRANSPLANT (H): ICD-10-CM

## 2021-05-04 DIAGNOSIS — K57.32 DIVERTICULITIS OF COLON: Primary | ICD-10-CM

## 2021-05-04 LAB
ALBUMIN UR-MCNC: NEGATIVE MG/DL
APPEARANCE UR: CLEAR
BILIRUB UR QL STRIP: NEGATIVE
COLOR UR AUTO: YELLOW
CYCLOSPORINE BLD LC/MS/MS-MCNC: 173 UG/L (ref 50–400)
GLUCOSE UR STRIP-MCNC: NEGATIVE MG/DL
HGB UR QL STRIP: NEGATIVE
KETONES UR STRIP-MCNC: NEGATIVE MG/DL
LEUKOCYTE ESTERASE UR QL STRIP: ABNORMAL
NITRATE UR QL: NEGATIVE
PH UR STRIP: 6 PH (ref 5–7)
RBC #/AREA URNS AUTO: <1 /HPF (ref 0–2)
SOURCE: ABNORMAL
SP GR UR STRIP: 1.03 (ref 1–1.03)
SQUAMOUS #/AREA URNS AUTO: 1 /HPF (ref 0–1)
TME LAST DOSE: 2215 H
UROBILINOGEN UR STRIP-MCNC: 0 MG/DL (ref 0–2)
WBC #/AREA URNS AUTO: 3 /HPF (ref 0–5)

## 2021-05-04 PROCEDURE — 74177 CT ABD & PELVIS W/CONTRAST: CPT | Performed by: RADIOLOGY

## 2021-05-04 PROCEDURE — 81001 URINALYSIS AUTO W/SCOPE: CPT | Performed by: PATHOLOGY

## 2021-05-04 RX ORDER — IOPAMIDOL 755 MG/ML
128 INJECTION, SOLUTION INTRAVASCULAR ONCE
Status: COMPLETED | OUTPATIENT
Start: 2021-05-04 | End: 2021-05-04

## 2021-05-04 RX ADMIN — IOPAMIDOL 128 ML: 755 INJECTION, SOLUTION INTRAVASCULAR at 10:50

## 2021-05-04 NOTE — TELEPHONE ENCOUNTER
Pain is back in lower abdomen for past 3 days. Like when had flare up with diverticulitis. No vomiting no diarrhea.. no fevers.   Pain gets worse with urinating and bowel movements just as last time.   Per dr leventhal patient to have CT and UA. Pt will call to make appt for soonest available and call with any changes.

## 2021-05-09 ENCOUNTER — TELEPHONE (OUTPATIENT)
Dept: LAB | Facility: CLINIC | Age: 61
End: 2021-05-09

## 2021-05-09 DIAGNOSIS — Z11.59 ENCOUNTER FOR SCREENING FOR OTHER VIRAL DISEASES: ICD-10-CM

## 2021-05-09 LAB
LABORATORY COMMENT REPORT: ABNORMAL
SARS-COV-2 RNA RESP QL NAA+PROBE: NORMAL
SARS-COV-2 RNA RESP QL NAA+PROBE: POSITIVE
SPECIMEN SOURCE: ABNORMAL
SPECIMEN SOURCE: NORMAL

## 2021-05-09 PROCEDURE — U0003 INFECTIOUS AGENT DETECTION BY NUCLEIC ACID (DNA OR RNA); SEVERE ACUTE RESPIRATORY SYNDROME CORONAVIRUS 2 (SARS-COV-2) (CORONAVIRUS DISEASE [COVID-19]), AMPLIFIED PROBE TECHNIQUE, MAKING USE OF HIGH THROUGHPUT TECHNOLOGIES AS DESCRIBED BY CMS-2020-01-R: HCPCS | Performed by: INTERNAL MEDICINE

## 2021-05-09 PROCEDURE — U0005 INFEC AGEN DETEC AMPLI PROBE: HCPCS | Performed by: INTERNAL MEDICINE

## 2021-05-10 ENCOUNTER — TELEPHONE (OUTPATIENT)
Dept: TRANSPLANT | Facility: CLINIC | Age: 61
End: 2021-05-10

## 2021-05-10 ENCOUNTER — TELEPHONE (OUTPATIENT)
Dept: FAMILY MEDICINE | Facility: CLINIC | Age: 61
End: 2021-05-10

## 2021-05-10 ENCOUNTER — PATIENT OUTREACH (OUTPATIENT)
Dept: GASTROENTEROLOGY | Facility: CLINIC | Age: 61
End: 2021-05-10

## 2021-05-10 NOTE — TELEPHONE ENCOUNTER
"Pt scheduled for ERCP on 5/12, positive for covid on 5/9, \"having no symptoms what so ever\". A few instances of shortness of breath, \"could be anxiety\". No fevers. \"some things going on with my abdomen, divertliticus, CT done on 5/4, no concerns there\"    Covid positive on 12/5/20, had negative test in March. After review with Dr. Canada he wants to proceed with procedure as scheduled on 5/12. Called patient, discussed procedure on as scheduled on 5/12.     ML  "

## 2021-05-10 NOTE — TELEPHONE ENCOUNTER
I spoke with Nicci and told her could not do lab on 5/16/21 due to quarantine.  Appointment cancelled  Tanisha Schilling RN

## 2021-05-10 NOTE — TELEPHONE ENCOUNTER
Spoke with patient. She does not have any fevers or cough. No loss of taste or smell.  Pt did have second COVID vaccine April 15th.     Pt is having less stools, a little more formed, but smaller. When notice the pain the most is when she has to urinate. Pt's UA was negative for UTI. She stopped metamucil for fear of having diarrhea from rejection medication changes and her PCP reviewed her scan and symptoms at preop and feels it could be from less stools and told her to take metamucil again.

## 2021-05-10 NOTE — TELEPHONE ENCOUNTER
[facility] Team:  Nicci said that she tested positive for COVID on 5/9/21 at the [facility].   She is scheduled to have a blood test at [facility] on 5/16/21;Dr. Thomas Leventhal from the [facility] has ordered labwork to be done regarding her post- liver transplant.    She reports:  1. that she tested positive for Covid  in December 2020.  2. Covid test  March 22 was negative.   3 Denies any covid symptoims; no cough , no shortness of breath , no fever.    Please call her at [phone] and advise as she wonders if she can come there on 5/16/21 and get her labs drawn.   Thank you.   Geovany Melendez, RN at [facility]

## 2021-05-10 NOTE — TELEPHONE ENCOUNTER
Pt scheduled for ERCP on Wed  Had her COVID test done yesterday and is positive  Needs to review other concerns also

## 2021-05-10 NOTE — TELEPHONE ENCOUNTER
"-Coronavirus (COVID-19) Notification    Pt informed about positive result, encouraged her to reach out to proc clinic about protocol. Pt understood this and will reach out to them via either [employer] or phone tomorrow. She had many questions regarding vaccination, false testing, spouse vaccinations, and her current sx if it was related to covid. I mentioned that she would need to speak w/ pcp/[facility] about those concerns. When offered mAb, pt stated she is going to say no until she speaks w/ pcp about mAb before deciding.    Caller Name (Patient, parent, daughter/son, grandparent, etc)  Pt    Reason for call  Notify of Positive Coronavirus (COVID-19) lab results, assess symptoms,  review [facility] recommendations    Lab Result    Lab test:  2019-nCoV rRt-PCR or SARS-CoV-2 PCR    Oropharyngeal AND/OR nasopharyngeal swabs is POSITIVE for 2019-nCoV RNA/SARS-COV-2 PCR (COVID-19 virus)    RN Recommendations/Instructions per [facility] Coronavirus COVID-19 recommendations    Brief introduction script  Introduce self then review script:  \"I am calling on behalf of [employer].  We were notified that your Coronavirus test (COVID-19) for was POSITIVE for the virus.  I have some information to relay to you but first I wanted to mention that the [place] Dept of Health will be contacting you shortly [it's possible [facility] already called Patient] to talk to you more about how you are feeling and other people you have had contact with who might now also have the virus.  Also, [facility] is Partnering with the [facility] for Covid-19 research, you may be contacted directly by research staff.\"    Assessment (Inquire about Patient's current symptoms)   Assessment   Current Symptoms at time of phone call: (if no symptoms, document No symptoms] Lower abdomen discomfort unsure if r/t to diverticulitis?   Symptoms onset (if applicable) 4/29 to 5/2     If at time of call, Patients symptoms hare worsened, the Patient " "should contact 911 or have someone drive them to Emergency Dept promptly:      If Patient calling 911, inform 911 personal that you have tested positive for the Coronavirus (COVID-19).  Place mask on and await 911 to arrive.    If Emergency Dept, If possible, please have another adult drive you to the Emergency Dept but you need to wear mask when in contact with other people.      Monoclonal Antibody Administration    You may be eligible to receive a new treatment with a monoclonal antibody for preventing hospitalization in patients at high risk for complications from COVID-19.   This medication is still experimental and available on a limited basis; it is given through an IV and must be given at an infusion center. Please note that not all people who are eligible will receive the medication since it is in limited supply.     Are you interested in being considered for this medication?  No.   Does the patient fit the criteria: Patient declined    If patient qualifies based on above criteria:  \"You will be contacted if you are selected to receive this treatment in the next 1-2 business days.   This is time sensitive and if you are not selected in the next 1-2 business days, you will not receive the medication.  If you do not receive a call to schedule, you have not been selected.\"      Review information with Patient    Your result was positive. This means you have COVID-19 (coronavirus).  We have sent you a letter that reviews the information that I'll be reviewing with you now.    How can I protect others?    If you have symptoms: stay home and away from others (self-isolate) until:    You've had no fever--and no medicine that reduces fever--for 1 full day (24 hours). And       Your other symptoms have gotten better. For example, your cough or breathing has improved. And     At least 10 days have passed since your symptoms started. (If you've been told by a doctor that you have a weak immune system, wait 20 days.) "     If you don't have symptoms: Stay home and away from others (self-isolate) until at least 10 days have passed since your first positive COVID-19 test. (Date test collected)    During this time:    Stay in your own room, including for meals. Use your own bathroom if you can.    Stay away from others in your home. No hugging, kissing or shaking hands. No visitors.     Don't go to work, school or anywhere else.     Clean  high touch  surfaces often (doorknobs, counters, handles, etc.). Use a household cleaning spray or wipes. You'll find a full list on the EPA website at www.epa.gov/pesticide-registration/list-n-disinfectants-use-against-sars-cov-2.     Cover your mouth and nose with a mask, tissue or other face covering to avoid spreading germs.    Wash your hands and face often with soap and water.    Make a list of people you have been in close contact with recently, even if either of you wore a face covering.   ; Start your list from 2 days before you became ill or had a positive test.  ; Include anyone that was within 6 feet of you for a cumulative total of 15 minutes or more in 24 hours. (Example: if you sat next to Pedro for 5 minutes in the morning and 10 minutes in the afternoon, then you were in close contact for 15 minutes total that day. Pedro would be added to your list.)    A public health worker will call or text you. It is important that you answer. They will ask you questions about possible exposures to COVID-19, such as people you have been in direct contact with and places you have visited.    Tell the people on your list that you have COVID-19; they should stay away from others for 14 days starting from the last time they were in contact with you (unless you are told something different from a public health worker).     Caregivers in these groups are at risk for severe illness due to COVID-19:  o People 65 years and older  o People who live in a nursing home or long-term care facility  o People  with chronic disease (lung, heart, cancer, diabetes, kidney, liver, immunologic)  o People who have a weakened immune system, including those who:  - Are in cancer treatment  - Take medicine that weakens the immune system, such as corticosteroids  - Had a bone marrow or organ transplant  - Have an immune deficiency  - Have poorly controlled HIV or AIDS  - Are obese (body mass index of 40 or higher)  - Smoke regularly    Caregivers should wear gloves while washing dishes, handling laundry and cleaning bedrooms and bathrooms.    Wash and dry laundry with special caution. Don't shake dirty laundry, and use the warmest water setting you can.    If you have a weakened immune system, ask your doctor about other actions you should take.    For more tips, go to www.cdc.gov/coronavirus/2019-ncov/downloads/10Things.pdf.    You should not go back to work until you meet the guidelines above for ending your home isolation. You don't need to be retested for COVID-19 before going back to work--studies show that you won't spread the virus if it's been at least 10 days since your symptoms started (or 20 days, if you have a weak immune system).    Employers: This document serves as formal notice of your employee's medical guidelines for going back to work. They must meet the above guidelines before going back to work in person.    How can I take care of myself?    1. Get lots of rest. Drink extra fluids (unless a doctor has told you not to).    2. Take Tylenol (acetaminophen) for fever or pain. If you have liver or kidney problems, ask your family doctor if it's okay to take Tylenol.     Take either:     650 mg (two 325 mg pills) every 4 to 6 hours, or     1,000 mg (two 500 mg pills) every 8 hours as needed.     Note: Don't take more than 3,000 mg in one day. Acetaminophen is found in many medicines (both prescribed and over-the-counter medicines). Read all labels to be sure you don't take too much.    For children, check the Tylenol  bottle for the right dose (based on their age or weight).    3. If you have other health problems (like cancer, heart failure, an organ transplant or severe kidney disease): Call your specialty clinic if you don't feel better in the next 2 days.    4. Know when to call 911: Emergency warning signs include:    Trouble breathing or shortness of breath    Pain or pressure in the chest that doesn't go away    Feeling confused like you haven't felt before, or not being able to wake up    Bluish-colored lips or face    5. Sign up for [employer]. We know it's scary to hear that you have COVID-19. We want to track your symptoms to make sure you're okay over the next 2 weeks. Please look for an email from [employer]--this is a free, online program that we'll use to keep in touch. To sign up, follow the link in the email. Learn more at [email or web address].    Where can I get more information?    [facility]: [email or web address]    Coronavirus Basics: www.health.[facility].[place]/diseases/coronavirus/basics.html    What to Do If You're Sick: www.cdc.gov/coronavirus/2019-ncov/about/steps-when-sick.html    Ending Home Isolation: www.cdc.gov/coronavirus/2019-ncov/hcp/disposition-in-home-patients.html     Caring for Someone with COVID-19: www.cdc.gov/coronavirus/2019-ncov/if-you-are-sick/care-for-someone.html     [facility] clinical trials (COVID-19 research studies): clinicalaffairs.[facility]/n-clinical-trials     A Positive COVID-19 letter will be sent via [employer] or the mail. (Exception, no letters sent to Presurgerical/Preprocedure Patients)    Eileen Zayas

## 2021-05-12 ENCOUNTER — ANESTHESIA EVENT (OUTPATIENT)
Dept: SURGERY | Facility: CLINIC | Age: 61
End: 2021-05-12
Payer: COMMERCIAL

## 2021-05-12 ENCOUNTER — HOSPITAL ENCOUNTER (OUTPATIENT)
Facility: CLINIC | Age: 61
Discharge: HOME | End: 2021-05-12
Attending: INTERNAL MEDICINE | Admitting: INTERNAL MEDICINE
Payer: COMMERCIAL

## 2021-05-12 ENCOUNTER — ANESTHESIA (OUTPATIENT)
Dept: SURGERY | Facility: CLINIC | Age: 61
End: 2021-05-12
Payer: COMMERCIAL

## 2021-05-12 ENCOUNTER — APPOINTMENT (OUTPATIENT)
Dept: GENERAL RADIOLOGY | Facility: CLINIC | Age: 61
End: 2021-05-12
Attending: INTERNAL MEDICINE
Payer: COMMERCIAL

## 2021-05-12 ENCOUNTER — PATIENT OUTREACH (OUTPATIENT)
Dept: GASTROENTEROLOGY | Facility: CLINIC | Age: 61
End: 2021-05-12

## 2021-05-12 VITALS
RESPIRATION RATE: 16 BRPM | HEART RATE: 67 BPM | TEMPERATURE: 97.9 F | SYSTOLIC BLOOD PRESSURE: 137 MMHG | BODY MASS INDEX: 40.98 KG/M2 | HEIGHT: 62 IN | DIASTOLIC BLOOD PRESSURE: 90 MMHG | OXYGEN SATURATION: 97 % | WEIGHT: 222.66 LBS

## 2021-05-12 DIAGNOSIS — K83.1 BILIARY STRICTURE (H): ICD-10-CM

## 2021-05-12 DIAGNOSIS — T86.41 LIVER TRANSPLANT REJECTION (H): ICD-10-CM

## 2021-05-12 DIAGNOSIS — Z46.89 ENCOUNTER FOR REMOVAL OF BILIARY STENT: Primary | ICD-10-CM

## 2021-05-12 LAB
ALBUMIN SERPL-MCNC: 3.6 G/DL (ref 3.4–5)
ALP SERPL-CCNC: 112 U/L (ref 40–150)
ALT SERPL W P-5'-P-CCNC: 24 U/L (ref 0–50)
AMYLASE SERPL-CCNC: 44 U/L (ref 30–110)
ANION GAP SERPL CALCULATED.3IONS-SCNC: 5 MMOL/L (ref 3–14)
AST SERPL W P-5'-P-CCNC: 28 U/L (ref 0–45)
BILIRUB SERPL-MCNC: 1.5 MG/DL (ref 0.2–1.3)
BUN SERPL-MCNC: 28 MG/DL (ref 7–30)
CALCIUM SERPL-MCNC: 9.2 MG/DL (ref 8.5–10.1)
CHLORIDE SERPL-SCNC: 110 MMOL/L (ref 94–109)
CO2 SERPL-SCNC: 27 MMOL/L (ref 20–32)
CREAT SERPL-MCNC: 1.12 MG/DL (ref 0.52–1.04)
CYCLOSPORINE BLD LC/MS/MS-MCNC: 236 UG/L (ref 50–400)
ERCP: NORMAL
ERYTHROCYTE [DISTWIDTH] IN BLOOD BY AUTOMATED COUNT: 12.9 % (ref 10–15)
GFR SERPL CREATININE-BSD FRML MDRD: 53 ML/MIN/{1.73_M2}
GLUCOSE BLDC GLUCOMTR-MCNC: 98 MG/DL (ref 70–99)
GLUCOSE SERPL-MCNC: 99 MG/DL (ref 70–99)
HCT VFR BLD AUTO: 40.5 % (ref 35–47)
HGB BLD-MCNC: 13.6 G/DL (ref 11.7–15.7)
INR PPP: 0.91 (ref 0.86–1.14)
LIPASE SERPL-CCNC: 46 U/L (ref 73–393)
MCH RBC QN AUTO: 34.3 PG (ref 26.5–33)
MCHC RBC AUTO-ENTMCNC: 33.6 G/DL (ref 31.5–36.5)
MCV RBC AUTO: 102 FL (ref 78–100)
PLATELET # BLD AUTO: 168 10E9/L (ref 150–450)
POTASSIUM SERPL-SCNC: 4.4 MMOL/L (ref 3.4–5.3)
PROT SERPL-MCNC: 7.4 G/DL (ref 6.8–8.8)
RBC # BLD AUTO: 3.96 10E12/L (ref 3.8–5.2)
SODIUM SERPL-SCNC: 142 MMOL/L (ref 133–144)
TME LAST DOSE: NORMAL H
WBC # BLD AUTO: 6.4 10E9/L (ref 4–11)

## 2021-05-12 PROCEDURE — 250N000011 HC RX IP 250 OP 636: Performed by: NURSE ANESTHETIST, CERTIFIED REGISTERED

## 2021-05-12 PROCEDURE — 80053 COMPREHEN METABOLIC PANEL: CPT | Performed by: STUDENT IN AN ORGANIZED HEALTH CARE EDUCATION/TRAINING PROGRAM

## 2021-05-12 PROCEDURE — 710N000012 HC RECOVERY PHASE 2, PER MINUTE: Performed by: INTERNAL MEDICINE

## 2021-05-12 PROCEDURE — 85610 PROTHROMBIN TIME: CPT | Performed by: STUDENT IN AN ORGANIZED HEALTH CARE EDUCATION/TRAINING PROGRAM

## 2021-05-12 PROCEDURE — 250N000025 HC SEVOFLURANE, PER MIN: Performed by: INTERNAL MEDICINE

## 2021-05-12 PROCEDURE — 250N000009 HC RX 250: Performed by: INTERNAL MEDICINE

## 2021-05-12 PROCEDURE — 999N000141 HC STATISTIC PRE-PROCEDURE NURSING ASSESSMENT: Performed by: INTERNAL MEDICINE

## 2021-05-12 PROCEDURE — C1769 GUIDE WIRE: HCPCS | Performed by: INTERNAL MEDICINE

## 2021-05-12 PROCEDURE — C1726 CATH, BAL DIL, NON-VASCULAR: HCPCS | Performed by: INTERNAL MEDICINE

## 2021-05-12 PROCEDURE — 80158 DRUG ASSAY CYCLOSPORINE: CPT | Performed by: STUDENT IN AN ORGANIZED HEALTH CARE EDUCATION/TRAINING PROGRAM

## 2021-05-12 PROCEDURE — 258N000003 HC RX IP 258 OP 636: Performed by: STUDENT IN AN ORGANIZED HEALTH CARE EDUCATION/TRAINING PROGRAM

## 2021-05-12 PROCEDURE — 36415 COLL VENOUS BLD VENIPUNCTURE: CPT | Performed by: STUDENT IN AN ORGANIZED HEALTH CARE EDUCATION/TRAINING PROGRAM

## 2021-05-12 PROCEDURE — 82150 ASSAY OF AMYLASE: CPT | Performed by: STUDENT IN AN ORGANIZED HEALTH CARE EDUCATION/TRAINING PROGRAM

## 2021-05-12 PROCEDURE — 370N000017 HC ANESTHESIA TECHNICAL FEE, PER MIN: Performed by: INTERNAL MEDICINE

## 2021-05-12 PROCEDURE — 255N000002 HC RX 255 OP 636: Performed by: INTERNAL MEDICINE

## 2021-05-12 PROCEDURE — 710N000010 HC RECOVERY PHASE 1, LEVEL 2, PER MIN: Performed by: INTERNAL MEDICINE

## 2021-05-12 PROCEDURE — C1877 STENT, NON-COAT/COV W/O DEL: HCPCS | Performed by: INTERNAL MEDICINE

## 2021-05-12 PROCEDURE — 83690 ASSAY OF LIPASE: CPT | Performed by: STUDENT IN AN ORGANIZED HEALTH CARE EDUCATION/TRAINING PROGRAM

## 2021-05-12 PROCEDURE — 82962 GLUCOSE BLOOD TEST: CPT

## 2021-05-12 PROCEDURE — 258N000003 HC RX IP 258 OP 636: Performed by: NURSE ANESTHETIST, CERTIFIED REGISTERED

## 2021-05-12 PROCEDURE — 272N000001 HC OR GENERAL SUPPLY STERILE: Performed by: INTERNAL MEDICINE

## 2021-05-12 PROCEDURE — 360N000082 HC SURGERY LEVEL 2 W/ FLUORO, PER MIN: Performed by: INTERNAL MEDICINE

## 2021-05-12 PROCEDURE — 250N000009 HC RX 250: Performed by: NURSE ANESTHETIST, CERTIFIED REGISTERED

## 2021-05-12 PROCEDURE — 85027 COMPLETE CBC AUTOMATED: CPT | Performed by: STUDENT IN AN ORGANIZED HEALTH CARE EDUCATION/TRAINING PROGRAM

## 2021-05-12 PROCEDURE — 999N000180 XR SURGERY CARM FLUORO LESS THAN 5 MIN: Mod: TC

## 2021-05-12 DEVICE — STENT FREEMAN PANCREA FLEX 7FRX7CM SGL PIGTAIL: Type: IMPLANTABLE DEVICE | Site: BILE DUCT | Status: FUNCTIONAL

## 2021-05-12 RX ORDER — ONDANSETRON 2 MG/ML
INJECTION INTRAMUSCULAR; INTRAVENOUS PRN
Status: DISCONTINUED | OUTPATIENT
Start: 2021-05-12 | End: 2021-05-12

## 2021-05-12 RX ORDER — SODIUM CHLORIDE, SODIUM LACTATE, POTASSIUM CHLORIDE, CALCIUM CHLORIDE 600; 310; 30; 20 MG/100ML; MG/100ML; MG/100ML; MG/100ML
INJECTION, SOLUTION INTRAVENOUS CONTINUOUS
Status: DISCONTINUED | OUTPATIENT
Start: 2021-05-12 | End: 2021-05-12 | Stop reason: HOSPADM

## 2021-05-12 RX ORDER — DEXAMETHASONE SODIUM PHOSPHATE 4 MG/ML
INJECTION, SOLUTION INTRA-ARTICULAR; INTRALESIONAL; INTRAMUSCULAR; INTRAVENOUS; SOFT TISSUE PRN
Status: DISCONTINUED | OUTPATIENT
Start: 2021-05-12 | End: 2021-05-12

## 2021-05-12 RX ORDER — HYDROMORPHONE HYDROCHLORIDE 1 MG/ML
.3-.5 INJECTION, SOLUTION INTRAMUSCULAR; INTRAVENOUS; SUBCUTANEOUS EVERY 5 MIN PRN
Status: DISCONTINUED | OUTPATIENT
Start: 2021-05-12 | End: 2021-05-12 | Stop reason: HOSPADM

## 2021-05-12 RX ORDER — GLYCOPYRROLATE 0.2 MG/ML
INJECTION, SOLUTION INTRAMUSCULAR; INTRAVENOUS PRN
Status: DISCONTINUED | OUTPATIENT
Start: 2021-05-12 | End: 2021-05-12

## 2021-05-12 RX ORDER — LIDOCAINE 40 MG/G
CREAM TOPICAL
Status: DISCONTINUED | OUTPATIENT
Start: 2021-05-12 | End: 2021-05-12 | Stop reason: HOSPADM

## 2021-05-12 RX ORDER — NALOXONE HYDROCHLORIDE 0.4 MG/ML
0.2 INJECTION, SOLUTION INTRAMUSCULAR; INTRAVENOUS; SUBCUTANEOUS
Status: DISCONTINUED | OUTPATIENT
Start: 2021-05-12 | End: 2021-05-12 | Stop reason: HOSPADM

## 2021-05-12 RX ORDER — LEVOFLOXACIN 5 MG/ML
INJECTION, SOLUTION INTRAVENOUS PRN
Status: DISCONTINUED | OUTPATIENT
Start: 2021-05-12 | End: 2021-05-12

## 2021-05-12 RX ORDER — PROPOFOL 10 MG/ML
INJECTION, EMULSION INTRAVENOUS PRN
Status: DISCONTINUED | OUTPATIENT
Start: 2021-05-12 | End: 2021-05-12

## 2021-05-12 RX ORDER — FENTANYL CITRATE 50 UG/ML
25-50 INJECTION, SOLUTION INTRAMUSCULAR; INTRAVENOUS
Status: DISCONTINUED | OUTPATIENT
Start: 2021-05-12 | End: 2021-05-12 | Stop reason: HOSPADM

## 2021-05-12 RX ORDER — KETAMINE HYDROCHLORIDE 10 MG/ML
INJECTION INTRAMUSCULAR; INTRAVENOUS PRN
Status: DISCONTINUED | OUTPATIENT
Start: 2021-05-12 | End: 2021-05-12

## 2021-05-12 RX ORDER — ONDANSETRON 2 MG/ML
4 INJECTION INTRAMUSCULAR; INTRAVENOUS EVERY 30 MIN PRN
Status: DISCONTINUED | OUTPATIENT
Start: 2021-05-12 | End: 2021-05-12 | Stop reason: HOSPADM

## 2021-05-12 RX ORDER — LIDOCAINE HYDROCHLORIDE 20 MG/ML
INJECTION, SOLUTION INFILTRATION; PERINEURAL PRN
Status: DISCONTINUED | OUTPATIENT
Start: 2021-05-12 | End: 2021-05-12

## 2021-05-12 RX ORDER — NALOXONE HYDROCHLORIDE 0.4 MG/ML
0.4 INJECTION, SOLUTION INTRAMUSCULAR; INTRAVENOUS; SUBCUTANEOUS
Status: DISCONTINUED | OUTPATIENT
Start: 2021-05-12 | End: 2021-05-12 | Stop reason: HOSPADM

## 2021-05-12 RX ORDER — ONDANSETRON 4 MG/1
4 TABLET, ORALLY DISINTEGRATING ORAL EVERY 30 MIN PRN
Status: DISCONTINUED | OUTPATIENT
Start: 2021-05-12 | End: 2021-05-12 | Stop reason: HOSPADM

## 2021-05-12 RX ORDER — INDOMETHACIN 50 MG/1
100 SUPPOSITORY RECTAL
Status: DISCONTINUED | OUTPATIENT
Start: 2021-05-12 | End: 2021-05-12 | Stop reason: HOSPADM

## 2021-05-12 RX ORDER — IOPAMIDOL 510 MG/ML
INJECTION, SOLUTION INTRAVASCULAR PRN
Status: DISCONTINUED | OUTPATIENT
Start: 2021-05-12 | End: 2021-05-12 | Stop reason: HOSPADM

## 2021-05-12 RX ORDER — FENTANYL CITRATE 50 UG/ML
INJECTION, SOLUTION INTRAMUSCULAR; INTRAVENOUS PRN
Status: DISCONTINUED | OUTPATIENT
Start: 2021-05-12 | End: 2021-05-12

## 2021-05-12 RX ADMIN — ONDANSETRON 4 MG: 2 INJECTION INTRAMUSCULAR; INTRAVENOUS at 08:44

## 2021-05-12 RX ADMIN — FENTANYL CITRATE 50 MCG: 50 INJECTION, SOLUTION INTRAMUSCULAR; INTRAVENOUS at 07:52

## 2021-05-12 RX ADMIN — SODIUM CHLORIDE, POTASSIUM CHLORIDE, SODIUM LACTATE AND CALCIUM CHLORIDE: 600; 310; 30; 20 INJECTION, SOLUTION INTRAVENOUS at 07:34

## 2021-05-12 RX ADMIN — MIDAZOLAM 2 MG: 1 INJECTION INTRAMUSCULAR; INTRAVENOUS at 07:34

## 2021-05-12 RX ADMIN — PHENYLEPHRINE HYDROCHLORIDE 100 MCG: 10 INJECTION INTRAVENOUS at 08:10

## 2021-05-12 RX ADMIN — PROPOFOL 200 MG: 10 INJECTION, EMULSION INTRAVENOUS at 07:44

## 2021-05-12 RX ADMIN — Medication 30 MG: at 07:44

## 2021-05-12 RX ADMIN — SUGAMMADEX 200 MG: 100 INJECTION, SOLUTION INTRAVENOUS at 08:45

## 2021-05-12 RX ADMIN — ROCURONIUM BROMIDE 50 MG: 10 INJECTION INTRAVENOUS at 07:44

## 2021-05-12 RX ADMIN — GLYCOPYRROLATE 0.2 MG: 0.2 INJECTION, SOLUTION INTRAMUSCULAR; INTRAVENOUS at 07:44

## 2021-05-12 RX ADMIN — LIDOCAINE HYDROCHLORIDE 100 MG: 20 INJECTION, SOLUTION INFILTRATION; PERINEURAL at 07:44

## 2021-05-12 RX ADMIN — DEXAMETHASONE SODIUM PHOSPHATE 6 MG: 4 INJECTION, SOLUTION INTRA-ARTICULAR; INTRALESIONAL; INTRAMUSCULAR; INTRAVENOUS; SOFT TISSUE at 08:05

## 2021-05-12 RX ADMIN — LEVOFLOXACIN 500 MG: 5 INJECTION, SOLUTION INTRAVENOUS at 07:51

## 2021-05-12 RX ADMIN — FENTANYL CITRATE 50 MCG: 50 INJECTION, SOLUTION INTRAMUSCULAR; INTRAVENOUS at 08:08

## 2021-05-12 ASSESSMENT — MIFFLIN-ST. JEOR: SCORE: 1533.25

## 2021-05-12 NOTE — ANESTHESIA CARE TRANSFER NOTE
Patient: Nicci Pemberton    Procedure(s):  ENDOSCOPIC RETROGRADE CHOLANGIOPANCREATOGRAPHY WITH BILIARY STENT EXCHANGE, DILATION AND SLUDGE/STONE REMOVAL    Diagnosis: Biliary stricture [K83.1]  Diagnosis Additional Information: No value filed.    Anesthesia Type:   General     Note:    Oropharynx: oropharynx clear of all foreign objects  Level of Consciousness: awake  Oxygen Supplementation: nasal cannula  Level of Supplemental Oxygen (L/min / FiO2): 3 LPM  Independent Airway: airway patency satisfactory and stable  Dentition: dentition unchanged  Vital Signs Stable: post-procedure vital signs reviewed and stable  Report to RN Given: handoff report given  Patient transferred to: PACU    Handoff Report: Identifed the Patient, Identified the Reponsible Provider, Reviewed the pertinent medical history, Discussed the surgical course, Reviewed Intra-OP anesthesia mangement and issues during anesthesia, Set expectations for post-procedure period and Allowed opportunity for questions and acknowledgement of understanding      Vitals: (Last set prior to Anesthesia Care Transfer)  CRNA VITALS  5/12/2021 0833 - 5/12/2021 0909      5/12/2021             Resp Rate (observed):  (!) 0        Electronically Signed By: SANTINO Ramey CRNA  May 12, 2021  9:09 AM

## 2021-05-12 NOTE — PROGRESS NOTES
SPIRITUAL HEALTH SERVICES  SPIRITUAL ASSESSMENT Progress Note  [facility] 3C   ON-CALL VISIT    REFERRAL SOURCE: Patient request at admission    Attempted visit; missed before patient was brought to Or    PLAN: I will advise unit or on call  to follow up. Spiritual Health Services remains available for patient, family, and staff support.     Please page the on call  either via [facility] [employer] (Spiritual Health/[facility]) or by placing a STAT or ASAP Epic referral for Spiritual Health (which will roll to the on call pager).        Yvetet King  Chaplain Resident  Pager: [phone]

## 2021-05-12 NOTE — TELEPHONE ENCOUNTER
Post ERCP (5/12/21) with Dr. Canada: Follow-up    Post procedure recommendations:   Confirm spontaneous stent passage by performing a flat   and upright abdominal x-ray in 4 weeks.    - Will recommend to recheck LFTs in 4 weeks in  transplant clinic. Repeat ERCP is indicated if there are clinical signs of biliary obstruction or cholangitis    Orders placed: xray in 4 weeks    Patient states: called on 5/12, feeling ok, reviewed op note and need for xray    Clinic contact and scheduling numbers verified for future questions/concerns.    Jennifer Pedro RN Care Coordinator

## 2021-05-12 NOTE — OR NURSING
Dr. Galeana saw patient at bedside, addressed all patient questions, OK to discharge home per surgeon. Spoke with Dr. Madrid who will address sign out.

## 2021-05-12 NOTE — ANESTHESIA PROCEDURE NOTES
Airway       Patient location during procedure: OR       Procedure Start/Stop Times: 5/12/2021 7:44 AM and 5/12/2021 7:47 AM  Staff -        CRNA: Shankar Chopra APRN CRNA       Performed By: CRNA  Consent for Airway        Urgency: elective  Indications and Patient Condition       Indications for airway management: jessi-procedural       Induction type:intravenous       Mask difficulty assessment: 1 - vent by mask    Final Airway Details       Final airway type: endotracheal airway       Successful airway: ETT - single  Endotracheal Airway Details        ETT size (mm): 7.0       Cuffed: yes       Successful intubation technique: direct laryngoscopy       DL Blade Type: Martinez 2       Grade View of Cords: 1       Adjucts: stylet       Position: Right       Measured from: gums/teeth       Secured at (cm): 21       Bite block used: None    Post intubation assessment        Placement verified by: capnometry, equal breath sounds and chest rise        Number of attempts at approach: 1       Number of other approaches attempted: 0       Secured with: pink tape       Ease of procedure: easy       Dentition: Intact    Medication(s) Administered   Medication Administration Time: 5/12/2021 7:47 AM

## 2021-05-12 NOTE — DISCHARGE INSTRUCTIONS
[facility], [place]  Same-Day Surgery ERCP Procedure   Adult Discharge Orders & Instructions     You had a procedure known as an Endoscopic Retrograde Cholangiopancreatography (ERCP). Your healthcare provider performed the ERCP to look at your bile or pancreatic ducts, and to locate and/or treat blockages (dilation, stenting, removal, etc.) in these ducts. Often biopsies, small samples of tissue, are taken to help diagnose and/or classify stages of disease growth. This procedure is used to diagnose diseases of the pancreas, bile ducts, pancreatic duct, liver, and gallbladder.     After your procedure   1. Make sure to clarify with your healthcare provider any diet restrictions (For example, clear liquid, low fat, no caffeine, etc.)   2. Do NOT take aspirin containing medications or any other blood-thinning medicines (anticoagulants) until your healthcare provider says it's OK.   3. You MAY be prescribed antibiotics, depending on what was done and/or found during your EUS, make sure to take antibiotics as prescribed by your healthcare provider    For 24 hours after surgery  1. Get plenty of rest.  A responsible adult must stay with you for at least 24 hours after you leave the hospital.   2. Do not drive or use heavy equipment.  If you have weakness or tingling, don't drive or use heavy equipment until this feeling goes away.  3. Do not drink alcohol.  4. Avoid strenuous or risky activities (gym, yoga, cycling, etc.).  Ask for help when climbing stairs.   5. You may feel lightheaded.  IF so, sit for a few minutes before standing.  Have someone help you get up.   6. If you have nausea (feel sick to your stomach): Drink only clear liquids such as apple juice, ginger ale, broth or 7-Up.  Rest may also help.  Be sure to drink enough fluids.  Move to a regular diet as you feel able.  7. If you feel bloated or have too much gas, use a heating pad on your belly to help reduce the discomfort.  This should help you feel better.   8. You may have a slight fever. This is normal for the first 24 hours.   9. You may have a dry mouth, a sore throat, muscle aches or trouble sleeping.  These are normal and will go away after 24 hours. A sore throat is most common. Use lozenges or gargle with salt water to ease the discomfort.   10. Do not make important or legal decisions.      Call your doctor for any of the followin. Chest pain, and/or shortness of breath  2. Abdominal  pain, bloating or cramping that has not improved or does not respond to pain reliving medications (Tylenol or narcotics if prescribed)   3. Difficulty swallowing or feeling as though food or liquids are stuck in your throat   4. Sore throat lasting more than 2 days or pain that has worsened over time   5. Black or tarry stools   6. Nausea and/or vomiting that is not resolving or has not responded to anti-nausea medications prescribed to you   7. It has been over 8 to 10 hours since surgery and you are still not able to urinate (pass water)   8. Headache for over 24 hours   9. Fever over 100.5 F (38 C) lasting more than 24 hours after the procedure   10. Signs of jaundice or blockage (fever, chills, abdominal pain, yellowing of the whites of your eyes, yellowing of your skin, and/or passing darker than normal urine)     To contact a doctor, call:   [ ] Dr. Guru Canada @ [phone] (GI Clinic) or [phone] (Monday thru Friday 8:00am to 4:30pm)   [ ] [phone] and ask for the gastroenterology resident on call (answered 24 hours a day)   [ ] Emergency Department: [facility]: [phone]     Take it easy when you get home.  Remember, same day surgery DOES NOT MEAN SAME DAY RECOVERY!  Healing is a gradual process.  You will need some time to recover - you may be more tired than you realize at first.  Rest and relax for at least the first 24 hours at home.  You'll feel better and heal faster if you take good care of  yourself.

## 2021-05-12 NOTE — ANESTHESIA POSTPROCEDURE EVALUATION
Patient: Nicci Pemberton    Procedure(s):  ENDOSCOPIC RETROGRADE CHOLANGIOPANCREATOGRAPHY WITH BILIARY STENT EXCHANGE, DILATION AND SLUDGE/STONE REMOVAL    Diagnosis:Biliary stricture [K83.1]  Diagnosis Additional Information: No value filed.    Anesthesia Type:  General    Note:  Disposition: Outpatient   Postop Pain Control: Uneventful            Sign Out: Well controlled pain   PONV: No   Neuro/Psych: Uneventful            Sign Out: Acceptable/Baseline neuro status   Airway/Respiratory: Uneventful            Sign Out: Acceptable/Baseline resp. status   CV/Hemodynamics: Uneventful            Sign Out: Acceptable CV status; No obvious hypovolemia; No obvious fluid overload   Other NRE:    DID A NON-ROUTINE EVENT OCCUR?            Last vitals:  Vitals:    05/12/21 0915 05/12/21 0930 05/12/21 0945   BP: (!) 154/94 (!) 138/92 (!) 137/90   Pulse: 72 68 67   Resp: 16 16 16   Temp:      SpO2: 99% 97% 97%       Last vitals prior to Anesthesia Care Transfer:  CRNA VITALS  5/12/2021 0833 - 5/12/2021 0933      5/12/2021             Resp Rate (observed):  (!) 0          Electronically Signed By: Nazanin Madrid MD  May 12, 2021  10:53 AM

## 2021-05-12 NOTE — ANESTHESIA PREPROCEDURE EVALUATION
Anesthesia Pre-Procedure Evaluation    Patient: Nicci Pemberton   MRN:     [id number] Gender:   female   Age:    60 year old :      1960        Preoperative Diagnosis: Chronic pain of both knees [M25.561, M25.562, G89.29]   Procedure(s):  Right  total knee arthroplasty     LABS:  CBC:   Lab Results   Component Value Date    WBC 6.4 2021    WBC 6.4 2021    HGB 13.6 2021    HGB 13.3 2021    HCT 40.5 2021    HCT 40.5 2021     2021     (L) 2021     BMP:   Lab Results   Component Value Date     2021     2021    POTASSIUM 4.4 2021    POTASSIUM 4.6 2021    CHLORIDE 110 (H) 2021    CHLORIDE 107 2021    CO2 PENDING 2021    CO2 28 2021    BUN PENDING 2021    BUN 29 2021    CR PENDING 2021    CR 1.14 (H) 2021    GLC PENDING 2021    GLC 83 2021     COAGS:   Lab Results   Component Value Date    PTT 36 2020    INR 1.02 2021    FIBR 682 (H) 12/10/2020     POC:   Lab Results   Component Value Date    BGM 98 2021    HCGS Negative 2018     OTHER:   Lab Results   Component Value Date    PH 7.46 (H) 2019    LACT 0.8 10/28/2020    A1C 5.0 2019    JACOB PENDING 2021    PHOS 3.8 2020    MAG 1.8 2020    ALBUMIN PENDING 2021    PROTTOTAL PENDING 2021    ALT PENDING 2021    AST PENDING 2021    ALKPHOS PENDING 2021    BILITOTAL PENDING 2021    LIPASE 61 (L) 2021    AMYLASE 42 2021    DOROTHEA 23 2019    TSH 0.88 2021    T4 0.70 (L) 2019    CRP 51.6 (H) 12/10/2020     (H) 10/28/2020        Preop Vitals    BP Readings from Last 3 Encounters:   21 127/88   21 123/80   21 121/75    Pulse Readings from Last 3 Encounters:   21 78   21 88   21 83      Resp Readings from Last 3 Encounters:   21 16   21 16   21 16  "   SpO2 Readings from Last 3 Encounters:   05/12/21 99%   03/29/21 100%   03/28/21 99%      Temp Readings from Last 1 Encounters:   05/12/21 36.4  C (97.6  F) (Oral)    Ht Readings from Last 1 Encounters:   05/12/21 1.575 m (5' 2\")      Wt Readings from Last 1 Encounters:   05/12/21 101 kg (222 lb 10.6 oz)    Estimated body mass index is 40.73 kg/m  as calculated from the following:    Height as of an earlier encounter on 5/12/21: 1.575 m (5' 2\").    Weight as of an earlier encounter on 5/12/21: 101 kg (222 lb 10.6 oz).     LDA:        Past Medical History:   Diagnosis Date     Anemia      Cellulitis      Cirrhosis of liver (H) 06/18/2018     Gastroesophageal reflux disease      Heterozygous alpha 1-antitrypsin deficiency (H)      High hepatic iron concentration determined by biopsy of liver      Liver cirrhosis secondary to ANGULO (H)      Liver transplanted (H) 08/18/2019     Lymphedema      Osteoarthritis     knees     SBP (spontaneous bacterial peritonitis) (H) 08/02/2019 8/1/19 in [facility]     Thrombocytopenia (H) 11/2020     Thyroid disease     Hypothyroid      Past Surgical History:   Procedure Laterality Date     ARTHROPLASTY KNEE Right 10/23/2020    Procedure: Right  total knee arthroplasty;  Surgeon: Mario Wallace MD;  [facility]     BENCH LIVER N/A 8/18/2019    Procedure: BACKBENCH PREPARATION, LIVER;  Surgeon: Mandeep Alford MD;  [facility]     COLONOSCOPY N/A 6/22/2018    Procedure: COLONOSCOPY;  colonoscopy;  Surgeon: Hugo Patel MD;  [facility]     ENDOSCOPIC RETROGRADE CHOLANGIOPANCREATOGRAM N/A 2/10/2020    Procedure: ENDOSCOPIC RETROGRADE CHOLANGIOPANCREATOGRAPHY with spinchterotomy;  Surgeon: Guru Rigoberto Canada MD;  [facility]     ENDOSCOPIC RETROGRADE CHOLANGIOPANCREATOGRAM N/A 5/13/2020    Procedure: ENDOSCOPIC RETROGRADE CHOLANGIOPANCREATOGRAPHY,Stent exchange and sludge removal;  Surgeon: Guru Rigoberto Canada MD;  Location:  OR "     ENDOSCOPIC RETROGRADE CHOLANGIOPANCREATOGRAM N/A 2021    Procedure: ENDOSCOPIC RETROGRADE CHOLANGIOPANCREATOGRAPHY, SPINCTEROTOMY, DEBRIDE REMOVAL, STENT PLACEMENT;  Surgeon: Guru Rigoberto Canada MD;  [facility]     ENDOSCOPIC RETROGRADE CHOLANGIOPANCREATOGRAPHY, EXCHANGE TUBE/STENT N/A 3/4/2020    Procedure: ENDOSCOPIC RETROGRADE CHOLANGIOPANCREATOGRAPHY, WITH biliary dilarion, stent exchange, stone removal;  Surgeon: Guru Rigoberto Canada MD;  [facility]     ESOPHAGOSCOPY, GASTROSCOPY, DUODENOSCOPY (EGD), COMBINED N/A 10/31/2019    Procedure: Esophagogastroduodenoscopy, With Biopsy;  Surgeon: Nerissa Aranda MD;  [facility]     IR FEEDING TUBE PLACEMENT W MOHAN/MD  2019     IR FLUORO 0-1 HOUR  2019     IR LIVER BIOPSY PERCUTANEOUS  3/25/2021     IR LUMBAR PUNCTURE  2019     KNEE SURGERY  10/23/20     TRANSPLANT LIVER RECIPIENT  DONOR N/A 2019    Procedure: TRANSPLANT, LIVER, RECIPIENT,  DONOR;  Surgeon: Mandeep Alford MD;  [facility]      Allergies   Allergen Reactions     Ciprofloxacin Dizziness and Nausea     Food      Fruits with cores and pits  Apples     Sulfa Drugs Other (See Comments)     Swollen joints     Furosemide Rash        Anesthesia Evaluation     . Pt has had prior anesthetic. Type: General and MAC.           ROS/MED HX    ENT/Pulmonary:  - neg pulmonary ROS     Neurologic: Comment: Vertigo    (+) - Peripheral neuropathy.     Cardiovascular:     (+) hypertension-----      METS/Exercise Tolerance:     Hematologic: Comments: Immunosuppressed status (H)        Musculoskeletal: Comment: Chronic pain of both knees   (+) arthritis (Osteoarthritis),       GI/Hepatic:     (+) GERD, Asymptomatic on medication, hepatitis liver disease (Hx of ANGULO and Heterozygous alpha 1 antitrypsin deficiency s/p Liver Transplant 19.),       Renal/Genitourinary:     (+) renal disease, type: CRI, Pt does  not require dialysis,    (-) History of transplant   Endo: Comment: Steroid-induced hyperglycemia    (+) thyroid problem, hypothyroidism, Obesity,       Psychiatric:  - neg psychiatric ROS       Infectious Disease:  - neg infectious disease ROS       Malignancy:       Other: Comment: Acquired lymphedema of leg  Venous hypertension of lower extremity, bilateral  Cramp in lower extremity associated with sleep      Heterozygous alpha 1-antitrypsin deficiency (H)    Vitamin D deficiency  Zinc deficiency       (+) , H/O Chronic Pain,                   ANESTHESIA PHYSICAL [id number]  Anesthesia Pre-Procedure Evaluation    Patient: Nicci Pemberton   MRN:     [id number] Gender:   female   Age:    59 year old :      1960        Preoperative Diagnosis: Biliary stricture [K83.1]   Procedure(s):  ENDOSCOPIC RETROGRADE CHOLANGIOPANCREATOGRAPHY     LABS:  CBC:   Lab Results   Component Value Date    WBC 6.4 2021    WBC 6.4 2021    HGB 13.6 2021    HGB 13.3 2021    HCT 40.5 2021    HCT 40.5 2021     2021     (L) 2021     BMP:   Lab Results   Component Value Date     2021     2021    POTASSIUM 4.4 2021    POTASSIUM 4.6 2021    CHLORIDE 110 (H) 2021    CHLORIDE 107 2021    CO2 PENDING 2021    CO2 28 2021    BUN PENDING 2021    BUN 29 2021    CR PENDING 2021    CR 1.14 (H) 2021    GLC PENDING 2021    GLC 83 2021     COAGS:   Lab Results   Component Value Date    PTT 36 2020    INR 1.02 2021    FIBR 682 (H) 12/10/2020     POC:   Lab Results   Component Value Date    BGM 98 2021    HCGS Negative 2018     OTHER:   Lab Results   Component Value Date    PH 7.46 (H) 2019    LACT 0.8 10/28/2020    A1C 5.0 2019    JACOB PENDING 2021    PHOS 3.8 2020    MAG 1.8 2020    ALBUMIN PENDING 2021    PROTTOTAL PENDING  "05/12/2021    ALT PENDING 05/12/2021    AST PENDING 05/12/2021    ALKPHOS PENDING 05/12/2021    BILITOTAL PENDING 05/12/2021    LIPASE 61 (L) 02/24/2021    AMYLASE 42 02/24/2021    DOROTHEA 23 09/04/2019    TSH 0.88 01/26/2021    T4 0.70 (L) 09/11/2019    CRP 51.6 (H) 12/10/2020     (H) 10/28/2020        Preop Vitals    BP Readings from Last 3 Encounters:   05/12/21 127/88   03/29/21 123/80   03/28/21 121/75    Pulse Readings from Last 3 Encounters:   05/12/21 78   03/29/21 88   03/28/21 83      Resp Readings from Last 3 Encounters:   05/12/21 16   03/29/21 16   03/28/21 16    SpO2 Readings from Last 3 Encounters:   05/12/21 99%   03/29/21 100%   03/28/21 99%      Temp Readings from Last 1 Encounters:   05/12/21 36.4  C (97.6  F) (Oral)    Ht Readings from Last 1 Encounters:   05/12/21 1.575 m (5' 2\")      Wt Readings from Last 1 Encounters:   05/12/21 101 kg (222 lb 10.6 oz)    Estimated body mass index is 40.73 kg/m  as calculated from the following:    Height as of an earlier encounter on 5/12/21: 1.575 m (5' 2\").    Weight as of an earlier encounter on 5/12/21: 101 kg (222 lb 10.6 oz).     LDA:        Past Medical History:   Diagnosis Date     Anemia      Cellulitis      Cirrhosis of liver (H) 06/18/2018     Gastroesophageal reflux disease      Heterozygous alpha 1-antitrypsin deficiency (H)      High hepatic iron concentration determined by biopsy of liver      Liver cirrhosis secondary to ANGULO (H)      Liver transplanted (H) 08/18/2019     Lymphedema      Osteoarthritis     knees     SBP (spontaneous bacterial peritonitis) (H) 08/02/2019 8/1/19 in [facility]     Thrombocytopenia (H) 11/2020     Thyroid disease     Hypothyroid      Past Surgical History:   Procedure Laterality Date     ARTHROPLASTY KNEE Right 10/23/2020    Procedure: Right  total knee arthroplasty;  Surgeon: Mario Wallace MD;  [facility]     BENCH LIVER N/A 8/18/2019    Procedure: BACKBENCH PREPARATION, LIVER;  Surgeon: Rocío" MD Mandeep;  [facility]     COLONOSCOPY N/A 2018    Procedure: COLONOSCOPY;  colonoscopy;  Surgeon: Hugo Patel MD;  [facility]     ENDOSCOPIC RETROGRADE CHOLANGIOPANCREATOGRAM N/A 2/10/2020    Procedure: ENDOSCOPIC RETROGRADE CHOLANGIOPANCREATOGRAPHY with spinchterotomy;  Surgeon: Guru Rigoberto Canada MD;  [facility]     ENDOSCOPIC RETROGRADE CHOLANGIOPANCREATOGRAM N/A 2020    Procedure: ENDOSCOPIC RETROGRADE CHOLANGIOPANCREATOGRAPHY,Stent exchange and sludge removal;  Surgeon: Guru Rigoberto Canada MD;  [facility]     ENDOSCOPIC RETROGRADE CHOLANGIOPANCREATOGRAM N/A 2021    Procedure: ENDOSCOPIC RETROGRADE CHOLANGIOPANCREATOGRAPHY, SPINCTEROTOMY, DEBRIDE REMOVAL, STENT PLACEMENT;  Surgeon: Guru Rigoberto Canada MD;  [facility]     ENDOSCOPIC RETROGRADE CHOLANGIOPANCREATOGRAPHY, EXCHANGE TUBE/STENT N/A 3/4/2020    Procedure: ENDOSCOPIC RETROGRADE CHOLANGIOPANCREATOGRAPHY, WITH biliary dilarion, stent exchange, stone removal;  Surgeon: Guru Rigoberto Canada MD;  [facility]     ESOPHAGOSCOPY, GASTROSCOPY, DUODENOSCOPY (EGD), COMBINED N/A 10/31/2019    Procedure: Esophagogastroduodenoscopy, With Biopsy;  Surgeon: Nerissa Aranda MD;  [facility]     IR FEEDING TUBE PLACEMENT W MOHAN/MD  2019     IR FLUORO 0-1 HOUR  2019     IR LIVER BIOPSY PERCUTANEOUS  3/25/2021     IR LUMBAR PUNCTURE  2019     KNEE SURGERY  10/23/20     TRANSPLANT LIVER RECIPIENT  DONOR N/A 2019    Procedure: TRANSPLANT, LIVER, RECIPIENT,  DONOR;  Surgeon: Mandeep Alford MD;  [facility]      Allergies   Allergen Reactions     Ciprofloxacin Dizziness and Nausea     Food      Fruits with cores and pits  Apples     Sulfa Drugs Other (See Comments)     Swollen joints     Furosemide Rash           Anesthesia Evaluation     . Pt has had prior anesthetic. Type: General (MAC 4, 7  "ETT)               ROS/MED HX     ENT/Pulmonary:       Neurologic:       Cardiovascular:     (+) ----. : . . . :. . Previous cardiac testing date:results:Stress Testdate:7/11/19 results:dobutamine stress echocardiogram test negative for ischemia at diagnostic level of peak stress (87% MPHR).ECG reviewed date:10/4/19 results:SR, ISABELLA, NSSTT changes date: results:           METS/Exercise Tolerance:     Hematologic:     (+) Anemia, -       Musculoskeletal:   (+) arthritis,  -        GI/Hepatic: Comment: Hx of ANGULO and Heterozygous alpha 1 antitrypsin deficiency s/p Liver Transplant 8/18/19.  Elevated LFTs  Per imaging report \"common bile duct measures 8 mm with nonspecific echogenic intraluminal content\"     (+) GERD        Renal/Genitourinary:     (+) chronic renal disease,        Endo:          Psychiatric:          Infectious Disease:          Malignancy:          Other:                      PHYSICAL EXAM:   Mental Status/Neuro: A/A/O   Airway: Facies: Feasible  Mallampati: II  Mouth/Opening: Full  TM distance: > 6 cm  Neck ROM: Full   Respiratory: Auscultation: CTAB     Resp. Rate: Normal     Resp. Effort: Normal      CV: Rhythm: Regular  Rate: Age appropriate  Heart: Normal Sounds  Edema: None   Comments:      Dental: Normal Dentition                Assessment:   ASA SCORE: 2    H&P: History and physical reviewed and following examination; no interval change.   Smoking Status:  Non-Smoker/Unknown   NPO Status: NPO Appropriate     Plan:   Anes. Type:  General   Pre-Medication: None   Induction:  IV (Standard)   Airway: ETT; Oral   Access/Monitoring: PIV   Maintenance: Balanced     Postop Plan:   Postop Pain: Opioids  Postop Sedation/Airway: Not planned  Disposition: Outpatient     PONV Management:   Adult Risk Factors: Female, Non-Smoker, Postop Opioids   Prevention: Ondansetron, Dexamethasone     CONSENT: Direct conversation   Plan and risks discussed with: Patient   Blood Products: Consent Deferred (Minimal Blood " Loss)                   Nazanin Madrid MD    Assessment:   ASA SCORE: 3            Plan:   Anes. Type:  General                                  Nazanin Madrid MD    Anesthesia Evaluation   Pt has had prior anesthetic. Type: General and MAC.        ROS/MED HX  ENT/Pulmonary:  - neg pulmonary ROS     Neurologic: Comment: Vertigo    (+) - Peripheral neuropathy.     Cardiovascular:     (+) hypertension-----    METS/Exercise Tolerance:     Hematologic: Comments: Immunosuppressed status (H)      Musculoskeletal: Comment: Chronic pain of both knees   (+) arthritis (Osteoarthritis),     GI/Hepatic:     (+) GERD, Asymptomatic on medication, hepatitis liver disease (Hx of ANGULO and Heterozygous alpha 1 antitrypsin deficiency s/p Liver Transplant 8/18/19.),     Renal/Genitourinary:     (+) renal disease, type: CRI, Pt does not require dialysis,  (-) History of transplant   Endo: Comment: Steroid-induced hyperglycemia    (+) thyroid problem, hypothyroidism, Obesity,     Psychiatric/Substance Use:  - neg psychiatric ROS     Infectious Disease:  - neg infectious disease ROS     Malignancy:  - neg malignancy ROS     Other: Comment: Acquired lymphedema of leg  Venous hypertension of lower extremity, bilateral  Cramp in lower extremity associated with sleep      Heterozygous alpha 1-antitrypsin deficiency (H)    Vitamin D deficiency  Zinc deficiency         (+) , H/O Chronic Pain,          Physical Exam    Airway        Mallampati: II   TM distance: > 3 FB   Neck ROM: full   Mouth opening: > 3 cm    Respiratory Devices and Support         Dental           Cardiovascular             Pulmonary                     Anesthesia Plan    ASA Status:  3      Anesthesia Type: General.     - Airway: ETT   Induction: Intravenous, Propofol.   Maintenance: Balanced.   Techniques and Equipment:     - Airway: Video-Laryngoscope     - Lines/Monitors: 2nd IV     Consents    Anesthesia Plan(s) and associated risks, benefits, and realistic alternatives  discussed. Questions answered and patient/representative(s) expressed understanding.     - Discussed with:  Patient      - Extended Intubation/Ventilatory Support Discussed: No.      - Patient is DNR/DNI Status: No    Use of blood products discussed: No .     Postoperative Care    Pain management: IV analgesics, Multi-modal analgesia.   PONV prophylaxis: Ondansetron (or other 5HT-3), Dexamethasone or Solumedrol     Comments:

## 2021-05-12 NOTE — ADDENDUM NOTE
Addendum  created 05/12/21 1253 by Shankar Chopra APRN CRNA    Intraprocedure LDAs edited, LDA properties accepted

## 2021-05-26 DIAGNOSIS — T86.41 LIVER TRANSPLANT REJECTION (H): ICD-10-CM

## 2021-05-26 DIAGNOSIS — Z94.4 STATUS POST LIVER TRANSPLANTATION (H): ICD-10-CM

## 2021-05-26 LAB
ALBUMIN SERPL-MCNC: 3.7 G/DL (ref 3.4–5)
ALP SERPL-CCNC: 112 U/L (ref 40–150)
ALT SERPL W P-5'-P-CCNC: 24 U/L (ref 0–50)
ANION GAP SERPL CALCULATED.3IONS-SCNC: 6 MMOL/L (ref 3–14)
AST SERPL W P-5'-P-CCNC: 25 U/L (ref 0–45)
BILIRUB DIRECT SERPL-MCNC: 0.3 MG/DL (ref 0–0.2)
BILIRUB SERPL-MCNC: 1.7 MG/DL (ref 0.2–1.3)
BUN SERPL-MCNC: 35 MG/DL (ref 7–30)
CALCIUM SERPL-MCNC: 9.2 MG/DL (ref 8.5–10.1)
CHLORIDE SERPL-SCNC: 106 MMOL/L (ref 94–109)
CO2 SERPL-SCNC: 29 MMOL/L (ref 20–32)
CREAT SERPL-MCNC: 1.06 MG/DL (ref 0.52–1.04)
CYCLOSPORINE BLD LC/MS/MS-MCNC: 161 UG/L (ref 50–400)
ERYTHROCYTE [DISTWIDTH] IN BLOOD BY AUTOMATED COUNT: 12.9 % (ref 10–15)
GFR SERPL CREATININE-BSD FRML MDRD: 57 ML/MIN/{1.73_M2}
GLUCOSE SERPL-MCNC: 100 MG/DL (ref 70–99)
HCT VFR BLD AUTO: 40 % (ref 35–47)
HGB BLD-MCNC: 13.3 G/DL (ref 11.7–15.7)
MCH RBC QN AUTO: 33.8 PG (ref 26.5–33)
MCHC RBC AUTO-ENTMCNC: 33.3 G/DL (ref 31.5–36.5)
MCV RBC AUTO: 102 FL (ref 78–100)
PLATELET # BLD AUTO: 142 10E9/L (ref 150–450)
POTASSIUM SERPL-SCNC: 4.2 MMOL/L (ref 3.4–5.3)
PROT SERPL-MCNC: 7.3 G/DL (ref 6.8–8.8)
RBC # BLD AUTO: 3.94 10E12/L (ref 3.8–5.2)
SODIUM SERPL-SCNC: 141 MMOL/L (ref 133–144)
TME LAST DOSE: 2225 H
WBC # BLD AUTO: 6.6 10E9/L (ref 4–11)

## 2021-05-26 PROCEDURE — 80158 DRUG ASSAY CYCLOSPORINE: CPT | Performed by: INTERNAL MEDICINE

## 2021-05-26 PROCEDURE — 36415 COLL VENOUS BLD VENIPUNCTURE: CPT | Performed by: INTERNAL MEDICINE

## 2021-05-26 PROCEDURE — 80076 HEPATIC FUNCTION PANEL: CPT | Performed by: INTERNAL MEDICINE

## 2021-05-26 PROCEDURE — 80048 BASIC METABOLIC PNL TOTAL CA: CPT | Performed by: INTERNAL MEDICINE

## 2021-05-26 PROCEDURE — 85027 COMPLETE CBC AUTOMATED: CPT | Performed by: INTERNAL MEDICINE

## 2021-05-27 DIAGNOSIS — Z94.4 LIVER REPLACED BY TRANSPLANT (H): ICD-10-CM

## 2021-05-27 RX ORDER — CYCLOSPORINE 25 MG/1
75 CAPSULE, LIQUID FILLED ORAL EVERY 12 HOURS
Qty: 540 CAPSULE | Refills: 3 | Status: SHIPPED | OUTPATIENT
Start: 2021-05-27 | End: 2021-06-10

## 2021-05-27 NOTE — TELEPHONE ENCOUNTER
ISSUE:   Cyclosporine level 161 on 5/26, goal 100-150, dose 100 mg BID.    PLAN:   Please call patient and confirm this was an accurate 12-hour trough. Verify Cyclosporine dose. Confirm no new medications or illness. Confirm no missed doses. If accurate trough and accurate dose, decrease Cyclosporine dose to 75 mg BID and repeat labs in 1-2 months  Zamzam Erickson, RN    OUTCOME:   Spoke with patient, they confirm accurate trough level and current dose 100 mg BID. Patient confirmed dose change to 75 mg BID and to repeat labs in 2 weeks as pt has been getting them done every 2 weeks. Orders sent to preferred pharmacy for dose change and lab for repeat labs. Patient voiced understanding of plan.     Lauren Zuluaga LPN

## 2021-05-30 VITALS — HEIGHT: 62 IN | BODY MASS INDEX: 41.55 KG/M2 | WEIGHT: 225.8 LBS

## 2021-05-30 VITALS — WEIGHT: 235 LBS | HEIGHT: 62 IN | BODY MASS INDEX: 43.24 KG/M2

## 2021-05-30 VITALS — BODY MASS INDEX: 46.01 KG/M2 | HEIGHT: 62 IN | WEIGHT: 250 LBS

## 2021-05-30 NOTE — PROGRESS NOTES
Date of Service:  19    Date last seen by Dr. De León:  2017    PCP: Pedro Ricardo MD    Impression:  1. Venous hypertension of the legs bilaterally-increased with liver disease  2. Scarring and fibrosis of legs bilaterally  3. Acquired lymphedema of the legs bilaterally-worsened with liver disease  4. Peripheral neuropathy per exam  5. Morbid obesity-improving  6. Leg cramping at night  7. Chronic liver failure with cirrhosis secondary to ANGULO  8. Alpha-1 antitrypsin deficiency  9. Vit D deficiency     Plan:  1. Questions were answered.  2. Continue compression and exercise.    3. VERO's with increased cramping.  Suspect mainly due to liver disease. Check arterial status.  4. To therapy to decrease swelling and will try pump.  Then velcro compression to allow adjustments with fluid variability.  To discontinue with any increase in shortness of breath.  Education given.  Orders written.  5. Patient will follow up in 3 months or when needed.    Time spent with patient 40 minutes with greater than 50% time in consultation, education and coordination of care.     ---------------------------------------------------------------------------------------------------------------------    Chief Complaint: Bilateral leg swelling and left anterior calf ulceration     History of Present Illness:     Nicci Pemberton returns to the [facility] with bilateral leg swelling and history of calf ulceration due to long-standing venous hypertension of the legs bilaterally with secondary acquired lymphedema complicated by  peripheral neuropathy and morbid obesity.  I last saw her on 2017.  She was to continue her compression and exercise and working with bariatrics.  I had given her information on how to get compression stockings less expensively.  She was to follow-up in 8 months.  She was lost to follow-up.  I last saw her she has continued to work with bariatrics and has been  losing weight.  She developed cirrhosis.  There have been some abnormalities on her LFTs as well as elevated ferritin and B12.  She has been seen by oncology hematology for work-up for any hematological malignancy.  Ultrasound and CT imaging revealed cholelithiasis.  It was decided not to do surgery with her comorbidities.  She is being evaluated for transplant. Her MELD scores have been stable with minimal fluctuation in labs.  She reports her swelling is much worse with the liver failure. She wears tubular compression only to try to control it some.  She has been unable to exercise with the leg swelling and knee pain which is from osteoarthritis. She has not noticed any ulcerations but she gets blisters on the legs.  She has been getting more cramping in her legs at night.  There has been no new numbness, tingling or weakness. There have been no new masses, rashes, or swellings of any other joints. There has been no new decrease in appetite, unexplained weight loss, abdominal bloating and bowel or bladder changes.  She has had no fevers.      Past Medical History:   Diagnosis Date     Abnormal LFTs      Bruising      Cellulitis     L leg     Edema of both legs      Fatty liver      Fatty metamorphosis      Hypothyroid      Measles      Mumps      Urinary incontinence      Zinc deficiency        History reviewed. No pertinent surgical history.      Current Outpatient Medications:      bumetanide (BUMEX) 1 MG tablet, Take 1 mg by mouth daily., Disp: , Rfl:      calcium carbonate-vitamin D3 500 mg(1,250mg) -400 unit Tab, Take 1 tablet by mouth daily., Disp: , Rfl:      cholecalciferol, vitamin D3, 1,000 unit tablet, Take 1,000 Units by mouth daily., Disp: , Rfl:      famotidine (PEPCID) 20 MG tablet, Take 20 mg by mouth at bedtime., Disp: , Rfl:      lactulose (ENULOSE) 10 gram/15 mL solution, Take 30 mL by mouth at bedtime., Disp: , Rfl:      levothyroxine (SYNTHROID, LEVOTHROID) 125 MCG tablet, Take 125 mcg by  mouth daily., Disp: , Rfl:      magnesium oxide (MAG-OX) 400 mg tablet, Take 400 mg by mouth daily., Disp: , Rfl:      spironolactone (ALDACTONE) 100 MG tablet, Take 200 mg by mouth daily., Disp: , Rfl:      rifAXIMin (XIFAXAN) 550 mg Tab tablet, Take 550 mg by mouth 2 (two) times a day., Disp: , Rfl:     Allergies   Allergen Reactions     Sulfa (Sulfonamide Antibiotics) Other (See Comments)     Joint swelling     Lasix [Furosemide] Rash       Social History     Socioeconomic History     Marital status:      Spouse name: Not on file     Number of children: Not on file     Years of education: Not on file     Highest education level: Not on file   Occupational History     Not on file   Social Needs     Financial resource strain: Not on file     Food insecurity:     Worry: Not on file     Inability: Not on file     Transportation needs:     Medical: Not on file     Non-medical: Not on file   Tobacco Use     Smoking status: Former Smoker     Packs/day: 1.00     Years: 35.00     Pack years: 35.00     Types: Cigarettes     Last attempt to quit: 10/20/2011     Years since quittin.7     Smokeless tobacco: Never Used   Substance and Sexual Activity     Alcohol use: Yes     Alcohol/week: 0.6 oz     Types: 1 Cans of beer per week     Comment: social     Drug use: No     Sexual activity: Yes     Partners: Male     Birth control/protection: Post-menopausal   Lifestyle     Physical activity:     Days per week: Not on file     Minutes per session: Not on file     Stress: Not on file   Relationships     Social connections:     Talks on phone: Not on file     Gets together: Not on file     Attends Confucianism service: Not on file     Active member of club or organization: Not on file     Attends meetings of clubs or organizations: Not on file     Relationship status: Not on file     Intimate partner violence:     Fear of current or ex partner: Not on file     Emotionally abused: Not on file     Physically abused: Not on  file     Forced sexual activity: Not on file   Other Topics Concern     Not on file   Social History Narrative    .  2 boys.  .  HR.        Family History   Problem Relation Age of Onset     No Medical Problems Mother      Restless legs syndrome Father      Mental illness Sister      Alcohol abuse Brother      No Medical Problems Son      Mental illness Maternal Grandfather      Heart failure Paternal Grandmother      No Medical Problems Son      No Medical Problems Maternal Grandmother      Breast cancer Paternal Aunt        Review of Systems:  Nicci Pemberton no new numbess, tingling or weakness, redness or rashes, fevers, new masses, abdominal bloating or discomfort, unexplained weight loss, increased pain, new ulcers, shortness of breath and chest pain  Full 12 point review of systems was completed.    Imaging:    I personally reviewed the following imaging today and those on care everywhere, if indicated    Interface, Radiant Ib - 03/20/2019 10:04 AM CDT  Exam: US ABDOMEN COMPLETE, 3/20/2019 9:59 AM    Indication: Patient at high risk for HCC. Cirrhosis of liver (H)    Comparison: 6/27/2018.    Technique: The abdomen was scanned in the standard fashion with  specialized ultrasound transducer(s) using both gray scale and limited  color/spectral Doppler techniques.    Findings:    Liver:    Coarse heterogeneous liver parenchyma with nodular contours and  irregular surface. No focal liver lesions. Main portal vein measures  0.7 cm. The main portal vein is patent with antegrade flow.  Portosystemic collateral vessels at the left upper quadrant.    US visualization score: A - No or minimal limitations    Gallbladder: Distended gallbladder. Gallbladder wall is thickened  measuring 9 mm, edematous with no hyperemia. Negative sonographic  Fuller's sign. Biliary sludge and small stones within the gallbladder.    Bile Ducts: Both the intra- and extrahepatic biliary system are of  normal caliber. The  common bile duct measures 5 mm in diameter.    Pancreas: Visualized portions of the head and body of the pancreas are  unremarkable.     Kidneys: Both kidneys are of normal echotexture, without mass nor  hydronephrosis.  The craniocaudal dimensions are: right- 9.9 cm, left-  11.8 cm.    Spleen: Borderline enlarged spleen measuring 13.3 cm in sagittal  dimension.    Aorta and IVC: The visualized portions of the aorta and IVC are  unremarkable. The aorta measures 2.7 cm in diameter and the IVC  measures 1.5 cm in diameter.    Fluid: No pleural effusions. Small volume of ascites, perihepatic.      Impression:  1. Cirrhotic configuration of the liver parenchyma. No focal liver  lesions.  a. LI-RADS US Category: US-1 Negative: No US evidence of HCC  b. Recommend continued surveillance US.  2. Sequela of portal hypertension including portosystemic collaterals,  borderline splenomegaly and mild perihepatic free fluid.  3. Stones and biliary sludge within the gallbladder. Distended  gallbladder with thickened edematous wall without hyperemia,  nonspecific finding, could be related to hepatic intrinsic disease  versus gallbladder inflammation in the appropriate clinical setting.  If there is clinical concern for acute cholecystitis recommend further  evaluation with nuclear medicine HIDA scan for better  characterization.    *Recommendations above based on LI-RADS? v2017:  https://www.acr.org/Clinical-Resources/Reporting-and-Data-Systems/LI-R  DS/Ultrasound-[email or web address]-RADS-[email or web address]    LIGIA JO MD    Labs:    I personally reviewed the following labs today and those on care everywhere, if indicated       Lab Results   Component Value Date    CREATININE 0.67 04/14/2019      Lab Results   Component Value Date    HGBA1C 4.2 02/15/2017           Lab Results   Component Value Date    BUN 15 04/14/2019              Lab Results   Component Value Date    ALBUMIN 2.5 (L) 04/14/2019       Vitamin D, Total (25-Hydroxy)   Date Value Ref  Range Status   08/09/2017 28.2 (L) 30.0 - 80.0 ng/mL Final         Physical Exam:  Vitals:    07/29/19 0803   BP: 126/48   Pulse: 76   Resp: 20   Temp: 98.9  F (37.2  C)     BMI 40.90 (was 46.64 2 years ago) weight 223 pounds    Circumferential measures:    Vasc Edema 1/4/2017 1/16/2017 2/15/2017 4/24/2017 7/29/2019   Right just above MTP 27 25.8 25.3 24.5 29   Right Ankle 33.5 29 28 28 38   Right Widest Calf 53.5 47.2 45.3 45.5 60.5   Right Thigh Up 10cm 67.5 67.5 67.5 63 71   Left - just above MTP 26.7 26.2 24.5 24.7 28.9   Left Ankle 35.7 30.2 29 28.8 40   Left Widest Calf 53.5 47 45.3 45.3 64.5   Left Thigh Up 10cm 66.5 66.5 66.5 61 71     Swelling measures significantly increased.    General:  59 y.o. female in no apparent distress.      Psych: Alert and oriented x 3.  Cooperative. Affect normal.    HEENT: Atraumatic and normocephalic.    Lungs: Clear to auscultation throughout with full inspiration.    CV: Normal S1 and S2, without murmurs, gallops, or rubs.  No audible carotid bruits. Regular rhythm.    Abdominal:  Normal bowel sounds.  No masses, tenderness, guarding or rigidity.  No inguinal lymphadenopathy or fullness palpated.      Musculoskeletal: Normal range of motion in knees and ankles bilaterally throughout. There is no active joint synovitis, erythema, swelling or joint laxity.      Neurological: Sensation is decreased to pin prick and light touch in a stocking distribution. Strength testing is normal in knee flexion, knee extension, ankle dorsiflexion and great toe extension bilaterally.      Vascular: Dorsalis pedis and posterior tibialis pulses are strong and equal bilaterally due to the swelling or audible with the Doppler. There are no significant telangietasias, medial ankle venous flares, venous varicosities and spider veins.     Integumentary: Skin of both legs is uniformly warm and dry and hyperpigmentated, with hyperkeratosis and keratoderma and with positive Stemmer's Sign in both second  toes.  There is significant increased swelling below the knees bilaterally.  There is +2 pitting edema.  There is erythema along the anterior shins.  There is no significant heat.  Fibrosis has increased and there is some papillomatosis.  No open ulcerations are noted. Nails are thickened.       Evelia De León MD, ABWMS, FACCWS, [facility]  Medical Director Wound Care and Lymphedema  [facility]  [phone]

## 2021-05-30 NOTE — TELEPHONE ENCOUNTER
----- Message from Evelia De León MD sent at 7/29/2019  5:21 PM CDT -----  Please let the patient know her arterial study looks good.  She should continue the plan of care as outlined at her visit.

## 2021-05-31 VITALS — HEIGHT: 62 IN | BODY MASS INDEX: 40.48 KG/M2 | WEIGHT: 220 LBS

## 2021-05-31 VITALS — WEIGHT: 236.1 LBS | BODY MASS INDEX: 43.45 KG/M2 | HEIGHT: 62 IN

## 2021-05-31 VITALS — HEIGHT: 62 IN | WEIGHT: 210 LBS | BODY MASS INDEX: 38.64 KG/M2

## 2021-05-31 VITALS — WEIGHT: 212 LBS | BODY MASS INDEX: 38.78 KG/M2

## 2021-05-31 VITALS — BODY MASS INDEX: 39.01 KG/M2 | HEIGHT: 62 IN | WEIGHT: 212 LBS

## 2021-05-31 VITALS — BODY MASS INDEX: 39.29 KG/M2 | WEIGHT: 213.5 LBS | HEIGHT: 62 IN

## 2021-05-31 VITALS — BODY MASS INDEX: 39.38 KG/M2 | HEIGHT: 62 IN | WEIGHT: 214 LBS

## 2021-05-31 NOTE — PROGRESS NOTES
Code Status:  FULL CODE  Visit Type: Problem Visit     Facility:  [facility] [[id number]]             History of Present Illness: Nicci Pemberton is a 59 y.o. female who I am seeing today for follow up on the TCU. Pt recently hospitalized on 7/31/19.  Along with iron overload secondary patient with a history of an ELIZA cirrhosis to alpha-1 antitrypsin heterozygous on the liver transplant list at the [facility], hypothyroidism, hypertension, and hernia.  Patient complained of a 2-day history of frequent watery diarrhea with bright red blood from her rectum with increased lower abdominal pain.  ER work-up and CT of her abdomen and pelvis showed enterocolitis, cirrhotic changes of the liver with moderate ascites and splenomegaly measuring 15 cm.  She underwent paracentesis on 7/31/2019 with a removal of 0.4 L of fluid.  Ascitic fluid showed WBCs with 88% PMNs consistent with SBP.  Fluid culture however later showed no growth but Gram stain showed 3+ p.m. with no organisms seen.  She was initially treated with Zosyn and Flagyl.  She was seen by GI/hepatology.  In the MELD was 31.  Previously 24 on 7/11/2019.  Patient also found to have anemia and thrombocytopenia, hyponatremia from her cirrhosis and low BP.  She did develop hepatic encephalopathy with intermittent confusion.  She was started on rifaximin on 8/5.  Her lactulose dose was increased and home dose diuretics were resumed.  She underwent ultrasound-guided paracentesis again on 8/5 with 50 cc of clear fluid removed.  Culture showed no growth.  However it did have 80% PMNs.  She was started on Cipro orally for STD prophylaxis.      Today patient lying in bed. She tells me she received a phone call from her GI MD telling her she has been moved up on the transplant list. She is somewhat tearful on exam today regarding circumstances and toll of chronic illness. She also reports stooling ~17 times in 24 hours. She feels very exhausted. Her appetite is  "poor secondary to \"everythinggoing in is coming out.\"   She continues on lactulose twice daily.  We will hold. She is very jaundiced.  Her hepatic enzymes continue to rise. She denies any itching.  No tremors. She continues with lower extremity edema.  She is receiving lymphedema wraps.  She continues on Bumex.  On Cipro for total of 14 days.  She was previously taking oxycodone however this was discontinued per Dr. Crespo.  She is eating fairly well.  She is emptying her bladder. Blood pressures low. She is asymptomatic. Abdominal girth continues to be the same as admit.  Her weight is down 3 pounds.  She has hyper bilirubinemia.  Previously  Ted at 18 now at 21.1.  Her sodium is 131 which has been stable.  She denies any shortness of breath or chest pain.      Active Ambulatory Problems     Diagnosis Date Noted     Edema 11/23/2016     Hypothyroidism 11/23/2016     Venous hypertension, chronic, with ulcer and inflammation, left (H) 11/23/2016     Venous hypertension, chronic, with inflammation, right 11/23/2016     Acquired lymphedema of leg 11/23/2016     Peripheral neuropathy 11/23/2016     Vitamin D deficiency 11/23/2016     Zinc deficiency      Obesity with serious comorbidity 08/23/2017     Cirrhosis of liver (H) 06/18/2018     Leg swelling 07/29/2019     Venous hypertension of lower extremity, bilateral 07/29/2019     Osteoarthritis of both knees, unspecified osteoarthritis type 07/29/2019     Chronic pain of both knees 07/29/2019     Cramp in lower extremity associated with sleep 07/29/2019     Enterocolitis 07/31/2019     Resolved Ambulatory Problems     Diagnosis Date Noted     Cellulitis of left lower extremity 09/27/2016     Morbid obesity with BMI of 40.0-44.9, adult (H) 11/23/2016     Past Medical History:   Diagnosis Date     Abnormal LFTs      Bruising      Cellulitis      Edema of both legs      Fatty liver      Fatty metamorphosis      Hypothyroid      Measles      Mumps      Urinary " incontinence      Zinc deficiency        Current Outpatient Medications   Medication Sig     bumetanide (BUMEX) 1 MG tablet Take 1 mg by mouth daily.     cholecalciferol, vitamin D3, 1,000 unit tablet Take 1,000 Units by mouth 2 (two) times a day.            ciprofloxacin HCl (CIPRO) 500 MG tablet Take 1 tablet (500 mg total) by mouth Daily at 6:00 am for 14 days.     famotidine (PEPCID) 20 MG tablet Take 20 mg by mouth 2 (two) times a day.            lactulose (ENULOSE) 20 gram/30 mL Soln solution Take 30 mL (20 g total) by mouth 3 (three) times a day.     levothyroxine (SYNTHROID, LEVOTHROID) 125 MCG tablet Take 125 mcg by mouth daily.     magnesium oxide (MAG-OX) 400 mg tablet Take 400 mg by mouth daily.     rifAXIMin (XIFAXAN) 550 mg Tab tablet Take 1 tablet (550 mg total) by mouth 2 (two) times a day.     spironolactone (ALDACTONE) 100 MG tablet Take 100 mg by mouth 2 (two) times a day at 9am and 6pm.              Allergies   Allergen Reactions     Sulfa (Sulfonamide Antibiotics) Other (See Comments)     Joint swelling     Lasix [Furosemide] Rash         Review of Systems  No fevers or chills. No headache, lightheadedness or dizziness. No SOB, chest pains or palpitations.  Appears alert and oriented x3.  Appetite is fair.. No nausea, vomiting, constipation she reports about 9 loose stools per day. No dysuria, frequency, burning or pain with urination. Tearful today. Otherwise review of systems are negative.     Physical Exam  PHYSICAL EXAMINATION:  Vital signs: BP 74/48, pulse 80, respirations 18, temperature 98.1, O2 sat 93% on room air.  Current weight 232.6 pounds.  Seeing today for follow-up on the TCU.  Patient recently hospitalized  General: Awake, Alert, oriented x3, appropriately, follows simple commands, conversant  HEENT:PERRLA, jaundice conjunctiva,sclerae icterus, moist oral mucosa  NECK: Supple, without any lymphadenopathy, or masses  CVS:  S1  S2, without murmur or gallop.   LUNG: Clear to  auscultation, No wheezes, rales or rhonci.  BACK: No kyphosis of the thoracic spine  ABDOMEN: Soft, obese, soft, nontender to palpation, with positive bowel sounds  EXTREMITIES: Good range of motion on both upper and lower extremities, 4+ pedal edema, compressionin place,, no calf tenderness  SKIN: Warm and dry, no rashes or erythema noted  NEUROLOGIC: Intact, pulses palpable  PSYCHIATRIC: Cognition intact. Tearful on exam.       Labs:      Recent Results (from the past 240 hour(s))   Basic Metabolic Panel   Result Value Ref Range    Sodium 128 (L) 136 - 145 mmol/L    Potassium 4.2 3.5 - 5.0 mmol/L    Chloride 98 98 - 107 mmol/L    CO2 30 22 - 31 mmol/L    Anion Gap, Calculation 0 (L) 5 - 18 mmol/L    Glucose 78 70 - 125 mg/dL    Calcium 8.7 8.5 - 10.5 mg/dL    BUN 24 (H) 8 - 22 mg/dL    Creatinine 0.68 0.60 - 1.10 mg/dL    GFR MDRD Af Amer >60 >60 mL/min/1.73m2    GFR MDRD Non Af Amer >60 >60 mL/min/1.73m2   Ammonia   Result Value Ref Range    Ammonia 46 (H) 11 - 35 umol/L   Hepatic Profile   Result Value Ref Range    Bilirubin, Total 15.2 (H) 0.0 - 1.0 mg/dL    Bilirubin, Direct 7.5 (H) <=0.5 mg/dL    Protein, Total 4.5 (L) 6.0 - 8.0 g/dL    Albumin 1.6 (L) 3.5 - 5.0 g/dL    Alkaline Phosphatase 105 45 - 120 U/L    AST 75 (H) 0 - 40 U/L    ALT 34 0 - 45 U/L   Protime-INR   Result Value Ref Range    INR 3.02 (H) 0.90 - 1.10   Enteric Pathogen panel (per [facility] request) - Misc. Lab Test   Result Value Ref Range    Miscellanous Test Dept. Chemistry Reference Test     Test Name Enteric Pathogen Panel      Performing Lab       [facility]  [facility] [id number] ([facility] [address] [place] [address]      Scan Result See Scanned Report    Cell Ct/Diff, Body Fluid   Result Value Ref Range    Color, Fluid Yellow     Appearance, Fluid Hazy     WBC, Fluid 1,811 (H) 0 - 99 /uL    RBC, Fluid <50,000 <50,000 /ul    Neutrophil % 80 (H) <=25 %    Lymphocyte % 5 <=78 %    Monocyte  % 15 <=71 %    Macrophage %  <=71 %    Mesothelial %  <=1 %    Eosinophil %  <=1 %    Other Cells %  <=1 %   Albumin, Body Fluid   Result Value Ref Range    Albumin, Fluid <0.6 g/dL   Glucose, Body Fluid   Result Value Ref Range    Glucose, Fluid 80 mg/dL   Culture/Gram Stain: Body Fluid   Result Value Ref Range    Culture No Growth     Gram Stain Result 3+ Polymorphonuclear leukocytes     Gram Stain Result No organisms seen    HM2(CBC w/o Differential)   Result Value Ref Range    WBC 9.8 4.0 - 11.0 thou/uL    RBC 2.71 (L) 3.80 - 5.40 mill/uL    Hemoglobin 10.8 (L) 12.0 - 16.0 g/dL    Hematocrit 30.8 (L) 35.0 - 47.0 %     (H) 80 - 100 fL    MCH 39.9 (H) 27.0 - 34.0 pg    MCHC 35.1 32.0 - 36.0 g/dL    RDW 14.8 (H) 11.0 - 14.5 %    Platelets 48 (LL) 140 - 440 thou/uL    MPV 9.5 8.5 - 12.5 fL   Comprehensive Metabolic Panel   Result Value Ref Range    Sodium 130 (L) 136 - 145 mmol/L    Potassium 3.9 3.5 - 5.0 mmol/L    Chloride 98 98 - 107 mmol/L    CO2 30 22 - 31 mmol/L    Anion Gap, Calculation 2 (L) 5 - 18 mmol/L    Glucose 90 70 - 125 mg/dL    BUN 19 8 - 22 mg/dL    Creatinine 0.69 0.60 - 1.10 mg/dL    GFR MDRD Af Amer >60 >60 mL/min/1.73m2    GFR MDRD Non Af Amer >60 >60 mL/min/1.73m2    Bilirubin, Total 16.4 (H) 0.0 - 1.0 mg/dL    Calcium 8.6 8.5 - 10.5 mg/dL    Protein, Total 4.9 (L) 6.0 - 8.0 g/dL    Albumin 1.7 (L) 3.5 - 5.0 g/dL    Alkaline Phosphatase 118 45 - 120 U/L    AST 90 (H) 0 - 40 U/L    ALT 39 0 - 45 U/L   Protime-INR   Result Value Ref Range    INR 2.93 (H) 0.90 - 1.10   Comprehensive Metabolic Panel   Result Value Ref Range    Sodium 131 (L) 136 - 145 mmol/L    Potassium 3.8 3.5 - 5.0 mmol/L    Chloride 98 98 - 107 mmol/L    CO2 29 22 - 31 mmol/L    Anion Gap, Calculation 4 (L) 5 - 18 mmol/L    Glucose 75 70 - 125 mg/dL    BUN 15 8 - 22 mg/dL    Creatinine 0.76 0.60 - 1.10 mg/dL    GFR MDRD Af Amer >60 >60 mL/min/1.73m2    GFR MDRD Non Af Amer >60 >60 mL/min/1.73m2    Bilirubin, Total 15.6  (H) 0.0 - 1.0 mg/dL    Calcium 8.0 (L) 8.5 - 10.5 mg/dL    Protein, Total 4.8 (L) 6.0 - 8.0 g/dL    Albumin 1.5 (L) 3.5 - 5.0 g/dL    Alkaline Phosphatase 126 (H) 45 - 120 U/L    AST 91 (H) 0 - 40 U/L    ALT 42 0 - 45 U/L   Protime-INR   Result Value Ref Range    INR 2.90 (H) 0.90 - 1.10   Hemoglobin   Result Value Ref Range    Hemoglobin 10.4 (L) 12.0 - 16.0 g/dL   Hepatic Profile   Result Value Ref Range    Bilirubin, Total 18.0 (H) 0.0 - 1.0 mg/dL    Bilirubin, Direct 8.1 (H) <=0.5 mg/dL    Protein, Total 4.9 (L) 6.0 - 8.0 g/dL    Albumin 1.6 (L) 3.5 - 5.0 g/dL    Alkaline Phosphatase 123 (H) 45 - 120 U/L    AST 96 (H) 0 - 40 U/L    ALT 40 0 - 45 U/L   Platelet Count   Result Value Ref Range    Platelets 48 (LL) 140 - 440 thou/uL   Sodium   Result Value Ref Range    Sodium 130 (L) 136 - 145 mmol/L   White Blood Count (WBC)   Result Value Ref Range    WBC 12.0 (H) 4.0 - 11.0 thou/uL   Creatinine   Result Value Ref Range    Creatinine 1.11 (H) 0.60 - 1.10 mg/dL    GFR MDRD Af Amer >60 >60 mL/min/1.73m2    GFR MDRD Non Af Amer 50 (L) >60 mL/min/1.73m2   INR   Result Value Ref Range    INR 2.86 (H) 0.90 - 1.10   Sodium   Result Value Ref Range    Sodium 131 (L) 136 - 145 mmol/L   Albumin   Result Value Ref Range    Albumin 1.6 (L) 3.5 - 5.0 g/dL   Bilirubin, Total   Result Value Ref Range    Bilirubin, Total 21.1 (HH) 0.0 - 1.0 mg/dL   Basic Metabolic Panel   Result Value Ref Range    Sodium 127 (L) 136 - 145 mmol/L    Potassium 4.5 3.5 - 5.0 mmol/L    Chloride 94 (L) 98 - 107 mmol/L    CO2 28 22 - 31 mmol/L    Anion Gap, Calculation 5 5 - 18 mmol/L    Glucose 77 70 - 125 mg/dL    Calcium 8.1 (L) 8.5 - 10.5 mg/dL    BUN 30 (H) 8 - 22 mg/dL    Creatinine 1.88 (H) 0.60 - 1.10 mg/dL    GFR MDRD Af Amer 33 (L) >60 mL/min/1.73m2    GFR MDRD Non Af Amer 27 (L) >60 mL/min/1.73m2   HM2(CBC w/o Differential)   Result Value Ref Range    WBC 9.7 4.0 - 11.0 thou/uL    RBC 2.45 (L) 3.80 - 5.40 mill/uL    Hemoglobin 9.5 (L) 12.0 -  16.0 g/dL    Hematocrit 27.7 (L) 35.0 - 47.0 %     (H) 80 - 100 fL    MCH 38.8 (H) 27.0 - 34.0 pg    MCHC 34.3 32.0 - 36.0 g/dL    RDW 14.8 (H) 11.0 - 14.5 %    Platelets 56 (L) 140 - 440 thou/uL    MPV 10.0 8.5 - 12.5 fL   Hepatic Profile   Result Value Ref Range    Bilirubin, Total 22.9 (HH) 0.0 - 1.0 mg/dL    Bilirubin, Direct 11.8 (H) <=0.5 mg/dL    Protein, Total 4.7 (L) 6.0 - 8.0 g/dL    Albumin 1.5 (L) 3.5 - 5.0 g/dL    Alkaline Phosphatase 142 (H) 45 - 120 U/L    AST 92 (H) 0 - 40 U/L    ALT 40 0 - 45 U/L   Albumin   Result Value Ref Range    Albumin 1.5 (L) 3.5 - 5.0 g/dL         Assessment/Plan:  1. Spontaneous bacterial peritonitis (H)  Continues on Cipro for 14 days.  WBC now 9.7.  A-febrile. No confusion.  Repeat CBC Monday.    2. Cirrhosis of liver with ascites, unspecified hepatic cirrhosis type (H)  Continues on Rifaximin. Followed by GI at the [facility].   Hyperbilirubinemia. Bilirubin up to 22.9.   Juandice.   GI following.   Continues on Lactulose ~17stools per day. Hold afternoon doses. Resume in am.   Last tap on 8/5  Repeat CBC, BMP, Hepatic profile on Monday.   We are unable to check ammonia level here   3. Hepatic encephalopathy (H)  No acute confusion noted   4. Hyponatremia  Na+ stable at 127   5. Thrombocytopenia (H)  Followed by hematology   6. Anemia, unspecified type  Hemoglobin stable at 10.4. No evidence of bleed.    7.      Hypotension                                                             Multifactorial. Low albumin at 1.6. RD to consult                                                                                            Continue compression 4+ edema. Conservative on fluids.       Pt has been moved up on the liver transplant list per GI today.     Electronically signed by: Nicci Pugh, SAIMA

## 2021-05-31 NOTE — PROGRESS NOTES
[facility]      Facility:    [facility] [[id number]]  Code Status: FULL CODE      Chief Complaint/Reason for Visit:  Chief Complaint   Patient presents with     H & P     Spontaneous bacterial peritonitis currently on antibiotics, cirrhosis with hepatic encephalopathy, enterocolitis, hyponatremia, anemia, thrombocytopenia, generalized edema, physical debility.       HPI:   Nicci is a 59 y.o. female who does have a history of Murphy cirrhosis and is currently on the waiting list for a liver transplant.  She also has iron overload secondary to alpha-1 antitrypsin heterozygous.  Her other comorbidities include hypothyroid hypertension and she was admitted to the hospital on 7/31/2019 of watery diarrhea and bright red blood in her rectum.  She did have dry heaves at this time and lower abdominal pain.  She was worked up very thoroughly was found to have enterocolitis and cirrhotic changes in the liver with moderate ascites.  There was a tap done on that day with a paracentesis diagnostic and it showed 33,520 white counts with 88% polymorphonucleocytes.  This is consistent with spontaneous bacterial peritonitis and fluid culture later showed no growth but she was put on antibiotics.  She was started on Zosyn and Flagyl and she was transferred to oral ciprofloxacin.  She also had normal cytopenia and anemia with as well as hyponatremia from her cirrhosis and hepatic profile did worsen.  She developed hepatic encephalopathy in and out of confusion at this time and elevated INR.  Meld score was monitored daily which trended up to 32 on 8 1 and peaked at 33 on 8 2.  Rifaximin was added on 8 5.  Her lactulose dose was increased and I do not have an actual ammonia level at this time assume it was high with her hepatic encephalopathy and she was treated probably and transferred here to the TCU at [facility] in stable condition.    Patient is having pain in her knees but she  says it is tolerable.  Is been doing this for long time and does not want to take any oxycodone secondary to the preponderance of possible addiction.  She is doing well at this time and asked me if she thought her spontaneous bacterial peritonitis is gone I said it is in the process at this time.  She is moving her bowels several times a day so the lactulose is working.  We cannot get ammonia level at this time but she seems alert and oriented but somewhat foggy and I am unclear of her baseline functioning.  Her blood pressure is low which may contribute to some of her dizziness at this time but she is actually doing okay.    Past Medical History:  Past Medical History:   Diagnosis Date     Abnormal LFTs      Bruising      Cellulitis     L leg     Edema of both legs      Fatty liver      Fatty metamorphosis      Hypothyroid      Measles      Mumps      Urinary incontinence      Zinc deficiency            Surgical History:  Past Surgical History:   Procedure Laterality Date     US PARACENTESIS  7/31/2019     US PARACENTESIS WITH ALBUMIN  8/5/2019       Family History:   Family History   Problem Relation Age of Onset     No Medical Problems Mother      Restless legs syndrome Father      Mental illness Sister      Alcohol abuse Brother      No Medical Problems Son      Mental illness Maternal Grandfather      Heart failure Paternal Grandmother      No Medical Problems Son      No Medical Problems Maternal Grandmother      Breast cancer Paternal Aunt        Social History:    Social History     Socioeconomic History     Marital status:      Spouse name: None     Number of children: None     Years of education: None     Highest education level: None   Occupational History     None   Social Needs     Financial resource strain: None     Food insecurity:     Worry: None     Inability: None     Transportation needs:     Medical: None     Non-medical: None   Tobacco Use     Smoking status: Former Smoker     Packs/day:  1.00     Years: 35.00     Pack years: 35.00     Types: Cigarettes     Last attempt to quit: 10/20/2011     Years since quittin.8     Smokeless tobacco: Never Used   Substance and Sexual Activity     Alcohol use: Yes     Alcohol/week: 0.6 oz     Types: 1 Cans of beer per week     Comment: social     Drug use: No     Sexual activity: Yes     Partners: Male     Birth control/protection: Post-menopausal   Lifestyle     Physical activity:     Days per week: None     Minutes per session: None     Stress: None   Relationships     Social connections:     Talks on phone: None     Gets together: None     Attends Yazdanism service: None     Active member of club or organization: None     Attends meetings of clubs or organizations: None     Relationship status: None     Intimate partner violence:     Fear of current or ex partner: None     Emotionally abused: None     Physically abused: None     Forced sexual activity: None   Other Topics Concern     None   Social History Narrative    .  2 boys.  .  HR.           Review of Systems   Constitutional:        Review of systems positive for pain in bilateral knees.  She does have diarrhea secondary to lactulose but denies any fevers chills nausea vomiting.  There is no change in vision hearing taste or smell weakness one side of the chest pain or shortness of breath.  And she is urinating without difficulty.  The remainder the review of systems is negative.       Vitals:    19 0937   BP: 96/58   Pulse: 79   Resp: 18   Temp: 97.8  F (36.6  C)   SpO2: 92%       Physical Exam   Constitutional: No distress.   HENT:   Head: Normocephalic and atraumatic.   Nose: Nose normal.   Eyes: Conjunctivae are normal. Right eye exhibits no discharge. Left eye exhibits no discharge.   Neck: Neck supple. No thyromegaly present.   Cardiovascular: Normal rate and regular rhythm.   Pulmonary/Chest: Effort normal and breath sounds normal. No respiratory distress. She has no  wheezes. She has no rales.   Abdominal:   Abdomen is distended but nontender.  It is not dull to percussion however with body habitus is this is difficult to detect.  There is no rebound or guarding.   Musculoskeletal: She exhibits edema. She exhibits no tenderness.   Lymphadenopathy:     She has no cervical adenopathy.   Neurological: She is alert. No cranial nerve deficit. She exhibits normal muscle tone.   Skin: Skin is warm and dry. She is not diaphoretic.   Psychiatric: She has a normal mood and affect. Her behavior is normal.       Medication List:  Current Outpatient Medications   Medication Sig     bumetanide (BUMEX) 1 MG tablet Take 1 mg by mouth daily.     cholecalciferol, vitamin D3, 1,000 unit tablet Take 1,000 Units by mouth 2 (two) times a day.            ciprofloxacin HCl (CIPRO) 500 MG tablet Take 1 tablet (500 mg total) by mouth Daily at 6:00 am for 14 days.     famotidine (PEPCID) 20 MG tablet Take 20 mg by mouth 2 (two) times a day.            lactulose (ENULOSE) 20 gram/30 mL Soln solution Take 30 mL (20 g total) by mouth 3 (three) times a day.     levothyroxine (SYNTHROID, LEVOTHROID) 125 MCG tablet Take 125 mcg by mouth daily.     magnesium oxide (MAG-OX) 400 mg tablet Take 400 mg by mouth daily.     oxyCODONE (ROXICODONE) 5 MG immediate release tablet Take 1 tablet (5 mg total) by mouth every 8 (eight) hours as needed.     rifAXIMin (XIFAXAN) 550 mg Tab tablet Take 1 tablet (550 mg total) by mouth 2 (two) times a day.     spironolactone (ALDACTONE) 100 MG tablet Take 100 mg by mouth 2 (two) times a day at 9am and 6pm.              Labs: As follows; sodium was 131, potassium 3.8, chloride was 9.8, CO2 was 29, anion gap was 4, glucose 75, BUN 15, creatinine 0.76, GFR is greater than 60, total bilirubin was 15.6, calcium was 8.0, albumin was 1.5, alk phosphatase 126, AST was 91, ALT was 42, INR was 2.9.      Assessment:    ICD-10-CM    1. Spontaneous bacterial peritonitis (H) K65.2    2.  Cirrhosis of liver with ascites, unspecified hepatic cirrhosis type (H) K74.60     R18.8    3. Hepatic encephalopathy (H) K72.90    4. Hyponatremia E87.1    5. Thrombocytopenia (H) D69.6    6. Anemia, unspecified type D64.9    7. Pain management R52        Plan: Plan at this time will continue physical and occupational therapy for strength and conditioning.  For hepatic encephalopathy we will watch her mental status very closely and adjust the lactulose as needed.  We will check a CBC basic metabolic profile and complete LFTs today secondary to hyperbilirubinemia and elevated liver function tests.  Abdominal girth to be done daily weights will be done daily and will fax these results to her GI specialist.    I will likely stop the oxycodone after this week and will keep her nausea as needed in case she really needs it but she is in fear of being addicted.  She we are using nonpharmacologic modalities on her knees at this time with ice and we will continue to monitor that pain at this time.    CBC will be done today.        Electronically signed by: Carlo Boo DO

## 2021-05-31 NOTE — PROGRESS NOTES
[facility]    Facility:   [facility] [[id number]]   Code Status: FULL CODE      CHIEF COMPLAINT/REASON FOR VISIT:  Chief Complaint   Patient presents with     Problem Visit     Generalized feeling of malaise with new onset over 2 days of abdominal pain and some confusion.       HISTORY:      HPI: Nicci is a 59 y.o. 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  She does have end-stage liver disease with a meld score of greater than 31 which is worsening.  She had gone and underwent paracentesis on 31 2019 and she did have spontaneous bacterial peritonitis that was treated appropriately.Patient asked to see me this morning through the nursing staff secondary to not feeling well.  She had lethargy fatigue and blood pressure was low in the systolics in the 80s.  She had been running low when she also I did review her labs the nurse practitioner did order for the 14th which is 2 days ago indicated worsening liver function test as well as worsening BUN/creatinine.  Also sodium was down to 127.Patient having has having 9 bowel movements a day and is likely dehydrated with a worsening creatinine from 0.71 up to 1.88 and a BUN elevated with a low sodium is indicative of acute kidney injury as well as end-stage liver disease.  She does have a worsening meld score at this time I do not have an INR at this time however it had been elevated in the hospital. Patient is currently also having some abdominal pain at this time which is concerning for spontaneous bacterial peritonitis.  She does deny any febrile episodes at this time and staff indicates she has not had any fevers.    I did discuss with patient in detail about options of  treatment.  Here I would like to get a repeat of liver function test is also possibly give some IV fluids.  However patient was insistent on going to the hospital at this time secondary to she is in line for liver transplant in our kidneys are starting to fail.  We cannot do an ammonia level here given the fact the patient is somewhat foggy at this time however she is on lactulose twice a day and having 9 bowel movements.  It is unlikely that the ammonia would be high but we cannot do that here at the facility.    Past Medical History:   Diagnosis Date     Abnormal LFTs      Bruising      Cellulitis     L leg     Edema of both legs      Fatty liver      Fatty metamorphosis      Hypothyroid      Measles      Mumps      Urinary incontinence      Zinc deficiency              Family History   Problem Relation Age of Onset     No Medical Problems Mother      Restless legs syndrome Father      Mental illness Sister      Alcohol abuse Brother      No Medical Problems Son      Mental illness Maternal Grandfather      Heart failure Paternal Grandmother      No Medical Problems Son      No Medical Problems Maternal Grandmother      Breast cancer Paternal Aunt      Social History     Socioeconomic History     Marital status:      Spouse name: None     Number of children: None     Years of education: None     Highest education level: None   Occupational History     None   Social Needs     Financial resource strain: None     Food insecurity:     Worry: None     Inability: None     Transportation needs:     Medical: None     Non-medical: None   Tobacco Use     Smoking status: Former Smoker     Packs/day: 1.00     Years: 35.00     Pack years: 35.00     Types: Cigarettes     Last attempt to quit: 10/20/2011     Years since quittin.8     Smokeless tobacco: Never Used   Substance and Sexual Activity     Alcohol use: Yes     Alcohol/week: 0.6 oz     Types: 1 Cans of beer per week     Comment: social     Drug use: No      Sexual activity: Yes     Partners: Male     Birth control/protection: Post-menopausal   Lifestyle     Physical activity:     Days per week: None     Minutes per session: None     Stress: None   Relationships     Social connections:     Talks on phone: None     Gets together: None     Attends Islam service: None     Active member of club or organization: None     Attends meetings of clubs or organizations: None     Relationship status: None     Intimate partner violence:     Fear of current or ex partner: None     Emotionally abused: None     Physically abused: None     Forced sexual activity: None   Other Topics Concern     None   Social History Narrative    .  2 boys.  .  HR.          Review of Systems   Constitutional:        Patient claims to have abdominal pain generally general lethargy and fatigue.  She does not believe she does feel queasy and nauseous at this time but has not vomited.  She is having multiple episodes of diarrhea and abdominal pain.  She has been afebrile.  And the remainder the review of systems is negative.       .  Vitals:    08/16/19 0955   BP: (!) 89/54   Pulse: 84   Resp: 22   Temp: 98.6  F (37  C)   SpO2: 92%       Physical Exam   Constitutional: No distress.   HENT:   Head: Normocephalic and atraumatic.   Nose: Nose normal.   Eyes: Conjunctivae are normal. Right eye exhibits no discharge. Left eye exhibits no discharge. Scleral icterus is present.   Neck: Neck supple. No thyromegaly present.   Cardiovascular: Normal rate and regular rhythm.   Pulmonary/Chest: Effort normal and breath sounds normal. No respiratory distress.   Abdominal: Soft. Bowel sounds are normal. She exhibits distension. There is tenderness.   Musculoskeletal: She exhibits edema.   Lymphadenopathy:     She has no cervical adenopathy.   Neurological: She is alert. She exhibits normal muscle tone.   Skin: Skin is warm and dry. She is not diaphoretic.   Patient does have positive jaundice.    Psychiatric: She has a normal mood and affect. Her behavior is normal.         LABS:  I did review labs from the 14th which was 2 days ago.Sodium was 127, potassium was 4.5, calcium was 8.1 these are all worsening from before.  BUN was 30 and her creatinine was 1.88 which is up from 0.75 and her GFR is 33.  Bilirubin is up to 22.9 and direct was 11.8.  Phosphate is up to 142 with ALT is 40 and AST is 92.      ASSESSMENT:      ICD-10-CM    1. Spontaneous bacterial peritonitis (H) K65.2    2. Cirrhosis of liver with ascites, unspecified hepatic cirrhosis type (H) K74.60     R18.8    3. Hepatic encephalopathy (H) K72.90    4. Hyponatremia E87.1    5. Hypotension due to hypovolemia I95.89     E86.1    6. Acute kidney injury (H) N17.9        PLAN:  Plan at this time will send to [facility] emergently secondary multiple issues.  #1 worsening meld score at this time with worsening kidney function as well as hypotension and likely fluid depletion.  It would not be a good idea to do fluids here at this time with the possibility of her filling up with fluid very rapidly.  She is on the list for a liver transplant and she needs to have her kidneys taken care of.  She also has low sodium which we could be contributing to her not feeling well and on the 14th it was 127 so it could be significantly lower at this time.  With this we will send the [facility] I did discuss with the nurse at the [facility] emergency room and she is can be sent to the [place].  45 minutes was spent on this follow-up critical care visit with greater than 50% of the time dedicated to coordination care this included discussion with nursing staff as well as nurse manager as well as the nurse on the phone in the emergency room at the [facility].      Total  minutes of which % was spent counseling and coordination of care of the above plan.    Electronically signed by: Carlo Boo DO

## 2021-05-31 NOTE — PROGRESS NOTES
Code Status:  FULL CODE  Visit Type: Problem Visit     Facility:  [facility] [[id number]]             History of Present Illness: Nicci Pemberton is a 59 y.o. female who I am seeing today for follow up on the TCU. Pt recently hospitalized on 7/31/19.  Along with iron overload secondary patient with a history of an ELIZA cirrhosis to alpha-1 antitrypsin heterozygous on the liver transplant list at the [facility], hypothyroidism, hypertension, and hernia.  Patient complained of a 2-day history of frequent watery diarrhea with bright red blood from her rectum with increased lower abdominal pain.  ER work-up and CT of her abdomen and pelvis showed enterocolitis, cirrhotic changes of the liver with moderate ascites and splenomegaly measuring 15 cm.  She underwent paracentesis on 7/31/2019 with a removal of 0.4 L of fluid.  Ascitic fluid showed WBCs with 88% PMNs consistent with SBP.  Fluid culture however later showed no growth but Gram stain showed 3+ p.m. with no organisms seen.  She was initially treated with Zosyn and Flagyl.  She was seen by GI/hepatology.  In the MELD was 31.  Previously 24 on 7/11/2019.  Patient also found to have anemia and thrombocytopenia, hyponatremia from her cirrhosis and low BP.  She did develop hepatic encephalopathy with intermittent confusion.  She was started on rifaximin on 8/5.  Her lactulose dose was increased and home dose diuretics were resumed.  She underwent ultrasound-guided paracentesis again on 8/5 with 50 cc of clear fluid removed.  Culture showed no growth.  However it did have 80% PMNs.  She was started on Cipro orally for STD prophylaxis.      Today patient lying in bed.  She is alert and oriented x3.  She denies any abdominal discomfort.  She tells me she is having ~9 loose stools per day.  She reports anything she puts and comes out.  She continues on lactulose twice daily.  She is very jaundiced.  She denies any itching.  No tremors.  She is followed at the  [facility].  She continues with lower extremity edema.  She is receiving lymphedema wraps.  She continues on Bumex.  On Cipro for total of 14 days.  She was previously taking oxycodone however this was discontinued per Dr. Crespo.  She is eating fairly well.  She is emptying her bladder.  Her blood pressure is on the low.  Nursing staff report with the electronic cuff in the 70s however manually in the 90s systolically.  She denies any dizziness.  She is tolerating therapy.  Her white count was slightly elevated at 12.0 on 8/10.  Previously 9.8 on 8/6.  Again afebrile.  Abdominal girth continues to be the same as admit.  Her weight is down 3 pounds.  She has hyper bilirubinemia.  Previously  Ted at 18 now at 21.1.  Her sodium is 131 which has been stable.  She denies any shortness of breath or chest pain.      Active Ambulatory Problems     Diagnosis Date Noted     Edema 11/23/2016     Hypothyroidism 11/23/2016     Venous hypertension, chronic, with ulcer and inflammation, left (H) 11/23/2016     Venous hypertension, chronic, with inflammation, right 11/23/2016     Acquired lymphedema of leg 11/23/2016     Peripheral neuropathy 11/23/2016     Vitamin D deficiency 11/23/2016     Zinc deficiency      Obesity with serious comorbidity 08/23/2017     Cirrhosis of liver (H) 06/18/2018     Leg swelling 07/29/2019     Venous hypertension of lower extremity, bilateral 07/29/2019     Osteoarthritis of both knees, unspecified osteoarthritis type 07/29/2019     Chronic pain of both knees 07/29/2019     Cramp in lower extremity associated with sleep 07/29/2019     Enterocolitis 07/31/2019     Resolved Ambulatory Problems     Diagnosis Date Noted     Cellulitis of left lower extremity 09/27/2016     Morbid obesity with BMI of 40.0-44.9, adult (H) 11/23/2016     Past Medical History:   Diagnosis Date     Abnormal LFTs      Bruising      Cellulitis      Edema of both legs      Fatty liver      Fatty metamorphosis       Hypothyroid      Measles      Mumps      Urinary incontinence      Zinc deficiency        Current Outpatient Medications   Medication Sig     bumetanide (BUMEX) 1 MG tablet Take 1 mg by mouth daily.     cholecalciferol, vitamin D3, 1,000 unit tablet Take 1,000 Units by mouth 2 (two) times a day.            ciprofloxacin HCl (CIPRO) 500 MG tablet Take 1 tablet (500 mg total) by mouth Daily at 6:00 am for 14 days.     famotidine (PEPCID) 20 MG tablet Take 20 mg by mouth 2 (two) times a day.            lactulose (ENULOSE) 20 gram/30 mL Soln solution Take 30 mL (20 g total) by mouth 3 (three) times a day.     levothyroxine (SYNTHROID, LEVOTHROID) 125 MCG tablet Take 125 mcg by mouth daily.     magnesium oxide (MAG-OX) 400 mg tablet Take 400 mg by mouth daily.     rifAXIMin (XIFAXAN) 550 mg Tab tablet Take 1 tablet (550 mg total) by mouth 2 (two) times a day.     spironolactone (ALDACTONE) 100 MG tablet Take 100 mg by mouth 2 (two) times a day at 9am and 6pm.              Allergies   Allergen Reactions     Sulfa (Sulfonamide Antibiotics) Other (See Comments)     Joint swelling     Lasix [Furosemide] Rash         Review of Systems  No fevers or chills. No headache, lightheadedness or dizziness. No SOB, chest pains or palpitations.  Appears alert and oriented x3.  Appetite is fair.. No nausea, vomiting, constipation she reports about 9 loose stools per day. No dysuria, frequency, burning or pain with urination. Otherwise review of systems are negative.     Physical Exam  PHYSICAL EXAMINATION:  Vital signs: BP 90/58, pulse 78, respirations 16, temperature 98.1, O2 sat 91% on room air.  Current weight 231.8 pounds.  Seeing today for follow-up on the TCU.  Patient recently hospitalized  General: Awake, Alert, oriented x3, appropriately, follows simple commands, conversant  HEENT:PERRLA, jaundice conjunctiva,sclerae icterus, moist oral mucosa  NECK: Supple, without any lymphadenopathy, or masses  CVS:  S1  S2, without  murmur or gallop.   LUNG: Clear to auscultation, No wheezes, rales or rhonci.  BACK: No kyphosis of the thoracic spine  ABDOMEN: Soft, obese, soft, nontender to palpation, with positive bowel sounds  EXTREMITIES: Good range of motion on both upper and lower extremities, 4+ pedal edema, compressionin place,, no calf tenderness  SKIN: Warm and dry, no rashes or erythema noted  NEUROLOGIC: Intact, pulses palpable  PSYCHIATRIC: Cognition intact      Labs:      Recent Results (from the past 240 hour(s))   HM2(CBC w/o Differential)   Result Value Ref Range    WBC 8.9 4.0 - 11.0 thou/uL    RBC 2.54 (L) 3.80 - 5.40 mill/uL    Hemoglobin 9.8 (L) 12.0 - 16.0 g/dL    Hematocrit 28.7 (L) 35.0 - 47.0 %     (H) 80 - 100 fL    MCH 38.6 (H) 27.0 - 34.0 pg    MCHC 34.1 32.0 - 36.0 g/dL    RDW 13.2 11.0 - 14.5 %    Platelets 39 (LL) 140 - 440 thou/uL    MPV 9.5 8.5 - 12.5 fL   Basic Metabolic Panel   Result Value Ref Range    Sodium 128 (L) 136 - 145 mmol/L    Potassium 4.3 3.5 - 5.0 mmol/L    Chloride 97 (L) 98 - 107 mmol/L    CO2 29 22 - 31 mmol/L    Anion Gap, Calculation 2 (L) 5 - 18 mmol/L    Glucose 84 70 - 125 mg/dL    Calcium 8.9 8.5 - 10.5 mg/dL    BUN 42 (H) 8 - 22 mg/dL    Creatinine 0.84 0.60 - 1.10 mg/dL    GFR MDRD Af Amer >60 >60 mL/min/1.73m2    GFR MDRD Non Af Amer >60 >60 mL/min/1.73m2   Hepatic Profile   Result Value Ref Range    Bilirubin, Total 18.3 (HH) 0.0 - 1.0 mg/dL    Bilirubin, Direct 8.7 (H) <=0.5 mg/dL    Protein, Total 4.8 (L) 6.0 - 8.0 g/dL    Albumin 1.8 (L) 3.5 - 5.0 g/dL    Alkaline Phosphatase 89 45 - 120 U/L    AST 58 (H) 0 - 40 U/L    ALT 32 0 - 45 U/L   Hepatic Profile   Result Value Ref Range    Bilirubin, Total 16.1 (H) 0.0 - 1.0 mg/dL    Bilirubin, Direct 7.8 (H) <=0.5 mg/dL    Protein, Total 4.7 (L) 6.0 - 8.0 g/dL    Albumin 1.7 (L) 3.5 - 5.0 g/dL    Alkaline Phosphatase 98 45 - 120 U/L    AST 65 (H) 0 - 40 U/L    ALT 35 0 - 45 U/L   INR   Result Value Ref Range    INR 2.90 (H) 0.90 -  1.10   Basic Metabolic Panel   Result Value Ref Range    Sodium 128 (L) 136 - 145 mmol/L    Potassium 4.4 3.5 - 5.0 mmol/L    Chloride 98 98 - 107 mmol/L    CO2 29 22 - 31 mmol/L    Anion Gap, Calculation 1 (L) 5 - 18 mmol/L    Glucose 68 (L) 70 - 125 mg/dL    Calcium 8.7 8.5 - 10.5 mg/dL    BUN 34 (H) 8 - 22 mg/dL    Creatinine 0.70 0.60 - 1.10 mg/dL    GFR MDRD Af Amer >60 >60 mL/min/1.73m2    GFR MDRD Non Af Amer >60 >60 mL/min/1.73m2   Ammonia   Result Value Ref Range    Ammonia 48 (H) 11 - 35 umol/L   Basic Metabolic Panel   Result Value Ref Range    Sodium 128 (L) 136 - 145 mmol/L    Potassium 4.2 3.5 - 5.0 mmol/L    Chloride 98 98 - 107 mmol/L    CO2 30 22 - 31 mmol/L    Anion Gap, Calculation 0 (L) 5 - 18 mmol/L    Glucose 78 70 - 125 mg/dL    Calcium 8.7 8.5 - 10.5 mg/dL    BUN 24 (H) 8 - 22 mg/dL    Creatinine 0.68 0.60 - 1.10 mg/dL    GFR MDRD Af Amer >60 >60 mL/min/1.73m2    GFR MDRD Non Af Amer >60 >60 mL/min/1.73m2   Ammonia   Result Value Ref Range    Ammonia 46 (H) 11 - 35 umol/L   Hepatic Profile   Result Value Ref Range    Bilirubin, Total 15.2 (H) 0.0 - 1.0 mg/dL    Bilirubin, Direct 7.5 (H) <=0.5 mg/dL    Protein, Total 4.5 (L) 6.0 - 8.0 g/dL    Albumin 1.6 (L) 3.5 - 5.0 g/dL    Alkaline Phosphatase 105 45 - 120 U/L    AST 75 (H) 0 - 40 U/L    ALT 34 0 - 45 U/L   Protime-INR   Result Value Ref Range    INR 3.02 (H) 0.90 - 1.10   Enteric Pathogen panel (per [facility] request) - Misc. Lab Test   Result Value Ref Range    Miscellanous Test Dept. Chemistry Reference Test     Test Name Enteric Pathogen Panel      Performing Lab       [facility]  [facility] [id number] ([facility] [address]) [address] [place] [address]      Scan Result See Scanned Report    Cell Ct/Diff, Body Fluid   Result Value Ref Range    Color, Fluid Yellow     Appearance, Fluid Hazy     WBC, Fluid 1,811 (H) 0 - 99 /uL    RBC, Fluid <50,000 <50,000 /ul    Neutrophil % 80 (H) <=25 %    Lymphocyte  % 5 <=78 %    Monocyte % 15 <=71 %    Macrophage %  <=71 %    Mesothelial %  <=1 %    Eosinophil %  <=1 %    Other Cells %  <=1 %   Albumin, Body Fluid   Result Value Ref Range    Albumin, Fluid <0.6 g/dL   Glucose, Body Fluid   Result Value Ref Range    Glucose, Fluid 80 mg/dL   Culture/Gram Stain: Body Fluid   Result Value Ref Range    Culture No Growth     Gram Stain Result 3+ Polymorphonuclear leukocytes     Gram Stain Result No organisms seen    HM2(CBC w/o Differential)   Result Value Ref Range    WBC 9.8 4.0 - 11.0 thou/uL    RBC 2.71 (L) 3.80 - 5.40 mill/uL    Hemoglobin 10.8 (L) 12.0 - 16.0 g/dL    Hematocrit 30.8 (L) 35.0 - 47.0 %     (H) 80 - 100 fL    MCH 39.9 (H) 27.0 - 34.0 pg    MCHC 35.1 32.0 - 36.0 g/dL    RDW 14.8 (H) 11.0 - 14.5 %    Platelets 48 (LL) 140 - 440 thou/uL    MPV 9.5 8.5 - 12.5 fL   Comprehensive Metabolic Panel   Result Value Ref Range    Sodium 130 (L) 136 - 145 mmol/L    Potassium 3.9 3.5 - 5.0 mmol/L    Chloride 98 98 - 107 mmol/L    CO2 30 22 - 31 mmol/L    Anion Gap, Calculation 2 (L) 5 - 18 mmol/L    Glucose 90 70 - 125 mg/dL    BUN 19 8 - 22 mg/dL    Creatinine 0.69 0.60 - 1.10 mg/dL    GFR MDRD Af Amer >60 >60 mL/min/1.73m2    GFR MDRD Non Af Amer >60 >60 mL/min/1.73m2    Bilirubin, Total 16.4 (H) 0.0 - 1.0 mg/dL    Calcium 8.6 8.5 - 10.5 mg/dL    Protein, Total 4.9 (L) 6.0 - 8.0 g/dL    Albumin 1.7 (L) 3.5 - 5.0 g/dL    Alkaline Phosphatase 118 45 - 120 U/L    AST 90 (H) 0 - 40 U/L    ALT 39 0 - 45 U/L   Protime-INR   Result Value Ref Range    INR 2.93 (H) 0.90 - 1.10   Comprehensive Metabolic Panel   Result Value Ref Range    Sodium 131 (L) 136 - 145 mmol/L    Potassium 3.8 3.5 - 5.0 mmol/L    Chloride 98 98 - 107 mmol/L    CO2 29 22 - 31 mmol/L    Anion Gap, Calculation 4 (L) 5 - 18 mmol/L    Glucose 75 70 - 125 mg/dL    BUN 15 8 - 22 mg/dL    Creatinine 0.76 0.60 - 1.10 mg/dL    GFR MDRD Af Amer >60 >60 mL/min/1.73m2    GFR MDRD Non Af Amer >60 >60 mL/min/1.73m2     Bilirubin, Total 15.6 (H) 0.0 - 1.0 mg/dL    Calcium 8.0 (L) 8.5 - 10.5 mg/dL    Protein, Total 4.8 (L) 6.0 - 8.0 g/dL    Albumin 1.5 (L) 3.5 - 5.0 g/dL    Alkaline Phosphatase 126 (H) 45 - 120 U/L    AST 91 (H) 0 - 40 U/L    ALT 42 0 - 45 U/L   Protime-INR   Result Value Ref Range    INR 2.90 (H) 0.90 - 1.10   Hemoglobin   Result Value Ref Range    Hemoglobin 10.4 (L) 12.0 - 16.0 g/dL   Hepatic Profile   Result Value Ref Range    Bilirubin, Total 18.0 (H) 0.0 - 1.0 mg/dL    Bilirubin, Direct 8.1 (H) <=0.5 mg/dL    Protein, Total 4.9 (L) 6.0 - 8.0 g/dL    Albumin 1.6 (L) 3.5 - 5.0 g/dL    Alkaline Phosphatase 123 (H) 45 - 120 U/L    AST 96 (H) 0 - 40 U/L    ALT 40 0 - 45 U/L   Platelet Count   Result Value Ref Range    Platelets 48 (LL) 140 - 440 thou/uL   Sodium   Result Value Ref Range    Sodium 130 (L) 136 - 145 mmol/L   White Blood Count (WBC)   Result Value Ref Range    WBC 12.0 (H) 4.0 - 11.0 thou/uL   Creatinine   Result Value Ref Range    Creatinine 1.11 (H) 0.60 - 1.10 mg/dL    GFR MDRD Af Amer >60 >60 mL/min/1.73m2    GFR MDRD Non Af Amer 50 (L) >60 mL/min/1.73m2   INR   Result Value Ref Range    INR 2.86 (H) 0.90 - 1.10   Sodium   Result Value Ref Range    Sodium 131 (L) 136 - 145 mmol/L   Albumin   Result Value Ref Range    Albumin 1.6 (L) 3.5 - 5.0 g/dL   Bilirubin, Total   Result Value Ref Range    Bilirubin, Total 21.1 (HH) 0.0 - 1.0 mg/dL         Assessment/Plan:  1. Spontaneous bacterial peritonitis (H)  Continues on Cipro for 14 days.  WBC slightly elevated at 12.  A-febrile. No confusion.  Repeat CBC    2. Cirrhosis of liver with ascites, unspecified hepatic cirrhosis type (H)  Continues on Rifaximin. Followed by GI at the [facility].   Hyperbilirubinemia. Bilirubin up to 21 from 18.   Juandice.   GI updated with labs at the [facility]. Awaiting response.   Continues on Lactulose ~9 stools per day.   Last tap on 8/5  Repeat CBC, BMP, Hepatic profile.   We are unable to check ammonia level here   3.  Hepatic encephalopathy (H)  No acute confusion noted   4. Hyponatremia  Na+ stable at 13a   5. Thrombocytopenia (H)  Followed by hematology   6. Anemia, unspecified type  Hemoglobin stable at 10.4. No evidence of bleed.    7.      Hypotension                                                             Multifactorial. Low albumin at 1.6. RD to consult                                                                                            Continue compression 4+ edema. Conservative on fluids.       60 minutes spent of which greater than 50% was face to face communication with the patient about labs, liver treatment and follow up.     Electronically signed by: Nicci Pugh CNP

## 2021-06-03 VITALS — BODY MASS INDEX: 41.15 KG/M2 | HEIGHT: 62 IN | WEIGHT: 223.6 LBS

## 2021-06-08 NOTE — PROGRESS NOTES
[facility] Daily Progress     Patient Name: Nicci Pembertno  Date: 2017  Visit #: 10  PTA visit #:  7  Referral Diagnosis: Swelling:  Venous, Lymphatic and Ulcers/wounds;  Right leg and Left leg   Referring provider: Evelia De León, *  Visit Diagnosis:     ICD-10-CM    1. Acquired lymphedema of leg I89.0    2. Venous hypertension, chronic, with inflammation, right I87.321    3. Morbid obesity with BMI of 45.0-49.9, adult E66.01     Z68.42          Assessment:   Volumes on R LE up by 1%, volumes on L LE down by .70%. LEs are looking good despite objective measures.   Wound on L lower leg healing.  Patient demonstrates understanding/independence with home program.  Patient is benefitting from skilled physical therapy and is making steady progress toward functional goals.         Goal Status:  Patient will have a decreased volume in : bilateral;LE;by 5%;to decrease risk of infection;for better fit of clothing;for ease of movement;in 4 weeks;Progressing toward  Patient will have decreased fibrosis, scar tissue for improved lymphatic mobilitiy : in 4 weeks;Progressing toward  Patient will perform, verbalize self-management of: skin care;self-monitoring;exercise;massage;self-compression;compression wear;compression care;infection prevention;in 4 weeks;Progressing toward    Plan / Patient Education:     Continue with initial plan of care. Continue with manual lymph drainage, medical compression bandages, exercises, skin care, and patient education,needs to coordinate garment fitting.  Pt to remove bandages if needed before next visit then don tubes for compression or trial self bandaging  See  MD orders for extended  PT 3-4x/week, up to 20 visits   Pt to follow up with Dr. De León on .        Subjective:   Pt reports the bandages roll down a little but not too much.  Pain Ratin    Objective:     Caregiver present: No    Observation of swelling: improved visibly, ridges R>L lower  leg    Volume measurements taken:  Yes      Skin condition is: improving    Compression:  Bandages on from last visit on Monday 1/9.  Medication for infection:  No   Lymphedema Lower Extremity Volume Measures             DATE: 1/11/17 1/11/17             Side R L R L R L R L R L R L   Toe               + 10 cm from tip/nailbed of second toe 25.3 25.3             + 20 cm 30.0 30.2             + 30 cm 40.0 38.0             + 40 cm 46.0 46.0             + 50 cm 50.0 49.0             + 60 cm               + 70 cm               + 80 cm               + 90 cm               Top of leg ()               Hips (ASIS)               Waist / Umbilicus               Volume: 4894.83 4733.45             Loss of Volume: increased by 1% Down by .70%             Difference R to L                     .  Treatment Today     TREATMENT MINUTES COMMENTS   Evaluation     Self-care/ Home management 30 Wound care to wound on L lower leg applied Prisim to wound and covered with absorbant bandage.  Medical compression bandaging to to bilateral lower extremities: Applied Eucerin skin lotion, stockinet, [id number] rolls, comprilan 03 rolls.Used a small piece of Tubigrip on top to protect bandages.  Instructed to remove them if uncomfortable. Instructed in MCB techniques for self wrapping     Manual therapy 30 Manual therapy: manual lymph drainage for bilateral lower extremities lymphedema  Deep abdominal breath, neck effleurage, short neck, shoulder collectors, bilateral axilla, bilateral inguinal, anterior inguinal to axilla anastomosis on right side and left side, left lower extremity evacuation, right lower extremity evacuation, abdomen, neck effleurage.     Neuromuscular Re-education     Therapeutic Activity     Therapeutic Exercises     Gait training     Modality__________________                Total 60    Blank areas are intentional and mean the treatment did not include these items.       Kat Mcneal PTA,CLT  1/11/2017

## 2021-06-08 NOTE — PROGRESS NOTES
"[facility] Daily Progress     Patient Name: Nicci Pemberton  Date: 2017  Visit #: 14  PTA visit #:  10  Referral Diagnosis: Swelling:  Venous, Lymphatic and Ulcers/wounds;  Right leg and Left leg   Referring provider: Evelia De León  Visit Diagnosis:     ICD-10-CM    1. Acquired lymphedema of leg I89.0    2. Venous hypertension, chronic, with inflammation, right I87.321    3. Morbid obesity with BMI of 45.0-49.9, adult E66.01     Z68.42          Assessment:     Bilateral lower extremities are smaller and softer, the left lower leg wound is healing and patient is independent doing wound dressing.  Good fitting of knee high compression stocking, patient is independent with donning/doffing compression stockings using  gloves and parachute ken.    Patient demonstrates understanding/independence with home program.  Patient is benefitting from skilled physical therapy and is making steady progress toward functional goals.         Goal Status:  Patient will have a decreased volume in : bilateral;LE;by 5%;to decrease risk of infection;for better fit of clothing;for ease of movement;in 4 weeks;Progressing toward  Patient will have decreased fibrosis, scar tissue for improved lymphatic mobilitiy : in 4 weeks;Progressing toward  Patient will perform, verbalize self-management of: skin care;self-monitoring;exercise;massage;self-compression;compression wear;compression care;infection prevention;in 4 weeks;Progressing toward    Plan / Patient Education:     Continue with initial plan of care. Continue with manual lymph drainage, medical compression bandages if needed, exercises, skin care, and patient education.  Patient will wear her compression stockings during the day time and off at night.    Subjective:     Pain Ratin   \"I can do the donning/doffing of the stockings\"    Objective:     Caregiver present: No    Observation of swelling:softer, smaller, wound is healing, it was covered with " dressing , patient took a picture at home and is smaller.     Volume measurements taken:  yes    Skin condition is: improving    Wound nearly closed, covered by wound dressing.    Compression: Compression bandages     Medication for infection:  No   Lymphedema Lower Extremity Volume Measures             DATE: 1/11/17 1/11/17 1-25-16 1-25-16           Side R L R L R L R L R L R L   Toe   8.2 8.8           + 10 cm from tip/nailbed of second toe 25.3 25.3 25.3 25.4           + 20 cm 30.0 30.2 28.3 28.3           + 30 cm 40.0 38.0 38.2 37.5           + 40 cm 46.0 46.0 46.8 45           + 50 cm 50.0 49.0 48 45.9           + 60 cm   54.5 57.7           Top of leg ()               Hips (ASIS)               Waist / Umbilicus               Volume: 4894.83 4733.45             Loss of Volume: increased by 1% Down by .70%             Difference R to L                     .  Treatment Today     TREATMENT MINUTES COMMENTS   Evaluation     Self-care/ Home management 25   Pt was instructed to wear her garments during the day. She was instructed to bandage at night if she feels that the swelling has increased. Instructed on self MLD.   Manual therapy 30 Manual therapy: manual lymph drainage for bilateral lower extremities lymphedema  Deep abdominal breath, neck effleurage, short neck, shoulder collectors, bilateral axilla, bilateral inguinal, anterior inguinal to axilla anastomosis on right side and left side, left lower extremity evacuation, right lower extremity evacuation, abdomen, neck effleurage.     Neuromuscular Re-education     Therapeutic Activity     Therapeutic Exercises     Gait training     Modality__________________                Total 55    Blank areas are intentional and mean the treatment did not include these items.       Komal Wall PT,CLT-FELA  1/25/2017

## 2021-06-08 NOTE — PROGRESS NOTES
[facility] Daily Progress     Patient Name: Nicci Pemberton  Date: 2017  Visit #: 8  PTA visit #:  5  Referral Diagnosis: Swelling:  Venous, Lymphatic and Ulcers/wounds;  Right leg and Left leg   Referring provider: Pedro Ricardo MD  Visit Diagnosis:   No diagnosis found.      Assessment:     Patient demonstrates understanding/independence with home program.  Patient is benefitting from skilled physical therapy and is making steady progress toward functional goals.         Goal Status:  Patient will have a decreased volume in : bilateral;LE;by 5%;to decrease risk of infection;for better fit of clothing;for ease of movement;in 4 weeks;Progressing toward  Patient will have decreased fibrosis, scar tissue for improved lymphatic mobilitiy : in 4 weeks;Progressing toward  Patient will perform, verbalize self-management of: skin care;self-monitoring;exercise;massage;self-compression;compression wear;compression care;infection prevention;in 4 weeks;Progressing toward    Plan / Patient Education:     Continue with initial plan of care. Continue with manual lymph drainage, medical compression bandages, exercises, skin care, and patient education,needs to coordinate garment fitting.  Pt to remove bandages if needed before next visit then don tubes for compression or trial self bandaging  See  MD orders for extended  PT 3-4x/week, up to 20 visits   Pt to follow up with Dr. De León on .      Subjective:   Pt reports decreased compliancy with her HEP and compression due to the holidays.  Pain Ratin    Objective:     Caregiver present: No    Observation of swelling: More puffy per pt    Volume measurements taken:  No      Skin condition is: Wound not  observed. New red rash R low leg, circumferentially  mid low leg, pt to monitor.    Compression:  Bandages on      Medication for infection:  No    Treatment Today     TREATMENT MINUTES COMMENTS   Evaluation     Self-care/ Home management 30   Medical  compression bandaging to to bilateral lower extremities: Applied Eucerin skin lotion, stockinet, [id number] rolls, comprilan 03 rolls.Used a small piece of Tubigrip on top to protect bandages.  Instructed to remove them if uncomfortable. Instructed in B techniques for self wrapping     Manual therapy 25 Manual therapy: manual lymph drainage for bilateral lower extremities lymphedema  Deep abdominal breath, neck effleurage, short neck, shoulder collectors, bilateral axilla, bilateral inguinal, anterior inguinal to axilla anastomosis on right side and left side, left lower extremity evacuation, right lower extremity evacuation, abdomen, neck effleurage.     Neuromuscular Re-education     Therapeutic Activity     Therapeutic Exercises     Gait training     Modality__________________                Total 55    Blank areas are intentional and mean the treatment did not include these items.       Kat Mcneal PTA,CLT  1/6/2017

## 2021-06-08 NOTE — PROGRESS NOTES
"Bariatric Follow-up    56 y.o.  female BMI:Body mass index is 43.33 kg/(m^2).    Weight:   Wt Readings from Last 1 Encounters:   02/17/17 (!) 235 lb (106.6 kg)    pounds  Height: 5' 1.75\" (1.568 m) (2/17/2017  8:05 AM)  Initial Weight: 250 lbs (2/17/2017  8:05 AM)  Weight: 235 lb (106.6 kg) (2/17/2017  8:05 AM)  Weight loss from initial: 15 (2/17/2017  8:05 AM)  % Weight loss: 6 % (2/17/2017  8:05 AM)  BMI (Calculated): 43.4 (2/17/2017  8:05 AM)  SpO2: 100 % (2/17/2017  8:05 AM)    Comorbidities:  Patient Active Problem List   Diagnosis     Edema     Hypothyroidism     Venous hypertension, chronic, with ulcer and inflammation, left     Venous hypertension, chronic, with inflammation, right     Acquired lymphedema of leg     Peripheral neuropathy     Vitamin D deficiency       Interim: 15# weight loss since starting phentermine. She is constipated. Sleeping OK. She feels better. Clothes fitting better.  Taking vitamin D. Cold sores. Managing stress OK. Platelets are 73, have been in the 90's previously. LFTs chronically elevated.Previous liver biopsy.      Plan: OK to stop calcium as it can be constipating. PTH is normal and she has nice calcium intake in her diet. Start a B-complex for cold sores.   -We reviewed her medications and whether associated with weight gain She is not on medications known to increase appetite. RF phentermine. Can continue with 1/2 tab BID.   Strength training. Her plan is to search online and use her weights. She is private so declines [facility]  consult. F/U Bariatrician 1 mo after dietitian.  Minimize juice or stop juice all together. \"Eat the fruit, no the fruit juice\" if willing. Continue nice portion control and limiting snacks.    We discussed [facility] Bariatric Basics including:  -eating 3 meals daily  -eating protein first  -eating slowly, chewing food well  -avoiding/limiting calorie containing beverages  -choosing wheat, not white with breads, crackers, " pastas, cristopher, bagels, tortillas, rice  -limiting restaurant or cafeteria eating to twice a week or less  -We discussed the importance of restorative sleep and stress management in maintaining a healthy weight.  -We discussed insulin resistance and glycemic index as it relates to appetite and weight control  -We discussed the National Weight Control Registry healthy weight maintenance strategies and ways to optimize metabolism.  -We discussed the importance of physical activity including cardiovascular and strength training in maintaining a healthier weight and explored viable options.    Most recent labs:  Lab Results   Component Value Date    WBC 5.9 02/15/2017    HGB 13.4 02/15/2017    HCT 38.5 02/15/2017     (H) 02/15/2017    PLT 73 (L) 02/15/2017     Lab Results   Component Value Date    CHOL 171 07/24/2015     Lab Results   Component Value Date    HDL 47 07/24/2015     Lab Results   Component Value Date    LDLCALC 98 07/24/2015     Lab Results   Component Value Date    TRIG 132 07/24/2015     No components found for: CHOLHDL  Lab Results   Component Value Date    ALT 56 (H) 02/15/2017    AST 91 (H) 02/15/2017    ALKPHOS 122 (H) 02/15/2017    BILITOT 2.3 (H) 02/15/2017     Lab Results   Component Value Date    HGBA1C 4.2 02/15/2017     Lab Results   Component Value Date    SOOSLHWA70 >2000 (H) 02/15/2017     Lab Results   Component Value Date    KOMMOBKA28RA 22.7 (L) 11/23/2016     No results found for: FERRITIN  Lab Results   Component Value Date    PTH 26 02/15/2017     No results found for: 90857  No results found for: 7597  Lab Results   Component Value Date    TSH 4.08 09/27/2016     DIETARY HISTORY  Meals Per Day: 3  Eating Protein First?: kind of  Food Diary: B:yogurt and OJ, special K with fruit, rice crispies L:salad fruit, sandwich opposite day D:fish, poultry, steak  Snacks Per Day: not typically  Typical Snack: fruit  Fluid Intake: intentional  Portion Control: improved  Calorie Containing  "Beverages: no pop. OJ 4/7 days 4 oz.  Alcohol per week: 0-2 or 4  Typical Protein Food Choices: see above  Choosing Whole Grains: wheat bread  Grocery Shopping is done by: herself, her   Food Preparation is done by: she does  Meals at Restaurant/Cafeteria/Take Out Per Week: 0-1  Eating at the Table: yes  TV is Off During Meals: on    Positive Changes Since Last Visit: has lost 15#  Struggling With: turning TV off, cold sores    Knowledgeable in Reading Food Labels: not yet  Getting Adequate Protein: likely  Sleeping 7-8 hours/day 6.5  Stress management does well    PHYSICAL ACTIVITY PATTERNS:  Cardiovascular: not the greatest d/t knee pain, lymphedema. Trying to walk more. Has an exercise bike. Hasn't used it d/t knee pain. Will try again.walks with her   Strength Training: not yet. Will google \"7 minute work out\"    REVIEW OF SYSTEMS  GENERAL/CONSTITUTIONAL:  Fatigue: energy improved and OK  CARDIOVASCULAR:  Chest Pain with Exertion: no  PULMONARY:  Dyspnea on exertion: yes  CPAP Use: no  Tobacco Use: no  Asthma Controlled: NA  GASTROINTESTINAL:  GERD/Heartburn: no  Gallbladder:   UROLOGIC:  Urinary Symptoms: USI  NEUROLOGIC:  Headaches: no  Paresthesias: no  PSYCHIATRIC:  Moods: OK  MUSCULOSKELETAL/RHEUMATOLOGIC  Arthralgias: yes  Myalgias: yes  ENDOCRINE:  Monitoring Blood Sugars: no  Sugars Well Controlled: yes  DERMATOLOGIC:  Rashes: no    PHYSICAL EXAM:  Vitals:   Visit Vitals     /61 (Patient Site: Right Arm, Patient Position: Sitting, Cuff Size: Adult Large)     Pulse 94     Resp 18     Ht 5' 1.75\" (1.568 m)     Wt (!) 235 lb (106.6 kg)     SpO2 100%     BMI 43.33 kg/m2     Height: 5' 1.75\" (1.568 m) (2/17/2017  8:05 AM)  Initial Weight: 250 lbs (2/17/2017  8:05 AM)  Weight: 235 lb (106.6 kg) (2/17/2017  8:05 AM)  Weight loss from initial: 15 (2/17/2017  8:05 AM)  % Weight loss: 6 % (2/17/2017  8:05 AM)  BMI (Calculated): 43.4 (2/17/2017  8:05 AM)  SpO2: 100 % (2/17/2017  8:05 AM)    GEN: " "Pleasant, well groomed, in no acute disress  EYES: EOMI,  ENT: airway patent 2+ mellampati  NECK: no carotid bruits, no anterior/supraclavicular lymphadenopathy, thyroid normal   HEART: Rhythm regular, rate regular, 2/6 murmur   LUNGS: Clear  ABDOMEN: soft, non-tender, obese, no rashes   VASCULAR: bilateral  lower extremity edema, compression stockings on  MUSCULOSKELETAL:  muscle mass OK  SKIN:  some color changes of venous stasis by her report, healing ulcerations \"rice size\" Bilateral compression stockings on    Time spent with patients 30 minutes, >50% in counseling and coordination of care.        "

## 2021-06-08 NOTE — PROGRESS NOTES
[facility] Discharge Summary  Patient Name: Nicci Pemberton  Date: 2/15/2017  Referral Diagnosis: Swelling:  Venous, Lymphatic and Ulcers/wounds;  Right leg and Left leg   Referring provider: Evelia De León  Visit Diagnosis:   1. Acquired lymphedema of leg     2. Venous hypertension, chronic, with inflammation, right     3. Morbid obesity with BMI of 45.0-49.9, adult         Goals:  Met  Patient will have a decreased volume in : bilateral;LE;by 5%;to decrease risk of infection;for better fit of clothing;for ease of movement;in 4 weeks;Progressing toward  Patient will have decreased fibrosis, scar tissue for improved lymphatic mobilitiy : in 4 weeks;Progressing toward  Patient will perform, verbalize self-management of: skin care;self-monitoring;exercise;massage;self-compression;compression wear;compression care;infection prevention;in 4 weeks;Progressing toward    Patient was seen for 14 visits from 12-6-16 to 1-25-17 with 0 missed appointments.  Patient was seen for complete decongestive physical therapy  As per  bilateral lower extremities protocol with manual lymph drainage,medical compression bandages patient education and exercises, patient was able to tolerate bandaging well, patient had good softening of thick fibrotic areas, skin is intact but very dry. Instructed on skin care and precautions, patient was able to verbalize instructions.  Patient was instructed and performed lymphedema exercises and written instructions were provided and patient demonstrates good understanding of them.  Patient had good edema reduction, refer to circumferential measurements chart.  Patient went to orthotist to be fitted for compression garments.  The patient met goals and has demonstrated understanding of and independence in the home program for self-care, and progression to next steps.  She will initiate contact if questions or concerns arise.    The patient was instructed to follow up with physician's  clinic.    Therapy will be discontinued at this time.  The patient will need a new referral to resume.    Thank you for your referral.  Komal Wall PT, OBDULIA-FELA  2/15/2017  7:44 AM

## 2021-06-08 NOTE — PATIENT INSTRUCTIONS - HE
Your symptoms show that you may have coronavirus (COVID-19). This illness can cause fever, cough and trouble breathing. Many people get a mild case and get better on their own. Some people can get very sick.     Not all patients are tested for COVID-19. If you need to be tested, your care team will let you know.     How can I protect others?    Without a test, we can't know for sure that you have COVID-19. For safety, it's very important to follow these rules.    Stay home and away from others (self-isolate) until:    At least 10 days have passed since your symptoms started. And     You've had no fever--and no medicine that reduces fever--for 3 full days (72 hours). And      Your other symptoms have resolved (gotten better).     During this time:    Stay in your own room (and use your own bathroom), if you can.    Stay away from others in your home. No hugging, kissing or shaking hands.    No visitors.    Don't go to work, school or anywhere else.     Clean  high touch  surfaces often (doorknobs, counters, handles, etc.). Use a household cleaning spray or wipes.    Cover your mouth and nose with a mask, tissue or wash cloth to avoid spreading germs.    Wash your hands and face often. Use soap and water.    For more tips, go to https://www.cdc.gov/coronavirus/2019-ncov/downloads/10Things.pdf.    How can I take care of myself?    1. Get lots of rest. Drink extra fluids (unless a doctor has told you not to).     2. Take Tylenol (acetaminophen) for fever or pain. If you have liver or kidney problems, ask your family doctor if it's okay to take Tylenol.     Adults can take either:     650 mg (two 325 mg pills) every 4 to 6 hours, or     1,000 mg (two 500 mg pills) every 8 hours as needed.     Note: Don't take more than 3,000 mg in one day.   Acetaminophen is found in many medicines (both prescribed and over-the-counter medicines). Read all labels to be sure you don't take too much.   For children, check the Tylenol  bottle for the right dose. The dose is based on the child's age or weight.    3. If you have other health problems (like cancer, heart failure, an organ transplant or severe kidney disease): Call your specialty clinic if you don't feel better in the next 2 days.    4. Know when to call 911: If your breathing is so bad that it keeps you from doing normal activities, call 911 or go to the emergency room. Tell them that you've been staying home and may have COVID-19.      What are the symptoms of COVID-19?     The most common symptoms are cough, fever and trouble breathing.     Less common symptoms include body aches, chills, diarrhea (loose, watery poops), fatigue (feeling very tired), headache, runny nose, sore throat and loss of smell.     COVID-19 can cause severe coughing (bronchitis) and lung infection (pneumonia).    How does it spread?     The virus may spread when a person coughs or sneezes into the air. The virus can travel about 6 feet this way, and it can live on surfaces.      Common  (household disinfectants) will kill the virus.    Who is at risk?  Anyone can catch COVID-19 if they're around someone who has the virus.    How can others protect themselves?     Stay away from people who have COVID-19 (or symptoms of COVID-19).    Wash hands often with soap and water. Or, use hand  with at least 60% alcohol.    Avoid touching the eyes, nose or mouth.     Wear a face mask when you go out in public, when sick or when caring for a sick person.      For more about COVID-19 and caring for yourself at home, please visit the CDC website at https://www.cdc.gov/coronavirus/2019-ncov/about/steps-when-sick.html.     To learn about care at [facility], go to [email or web address].    Below are the COVID-19 hotlines at the [place] [facility] ([facility]). Interpreters are available.     For health questions: Call 166-366-2366 or 1-284.966.6562 (7 a.m. to 7  p.m.)    For questions about schools and childcare: Call 576-794-3944 or 1-420.191.7929 (7 a.m. to 7 p.m.)

## 2021-06-08 NOTE — PROGRESS NOTES
[facility] Daily Progress     Patient Name: Nicci Pemberton  Date: 2017  Visit #: 12  PTA visit #:  9  Referral Diagnosis: Swelling:  Venous, Lymphatic and Ulcers/wounds;  Right leg and Left leg   Referring provider: Evelia De León, *  Visit Diagnosis:     ICD-10-CM    1. Acquired lymphedema of leg I89.0    2. Venous hypertension, chronic, with inflammation, right I87.321    3. Morbid obesity with BMI of 45.0-49.9, adult E66.01     Z68.42          Assessment:   Pt compliant with recommended home program  Wound on L lower leg healing well  Pt ready for garment fit  Patient demonstrates understanding/independence with home program.  Patient is benefitting from skilled physical therapy and is making steady progress toward functional goals.         Goal Status:  Patient will have a decreased volume in : bilateral;LE;by 5%;to decrease risk of infection;for better fit of clothing;for ease of movement;in 4 weeks;Progressing toward  Patient will have decreased fibrosis, scar tissue for improved lymphatic mobilitiy : in 4 weeks;Progressing toward  Patient will perform, verbalize self-management of: skin care;self-monitoring;exercise;massage;self-compression;compression wear;compression care;infection prevention;in 4 weeks;Progressing toward    Plan / Patient Education:     Continue with initial plan of care. Continue with manual lymph drainage, medical compression bandages, exercises, skin care, and patient education.Pt to remove bandages if needed before next visit then don tubes for compression or trial self bandaging  See  MD orders for extended  PT 3-4x/week, up to 20 visits   Pt to continue with PT 3x/week until fit with appropriate garments and wound is healed  Pt to schedule garment fit at [facility] faxed compression sock order over today, paper copy given to pt    Subjective:     Pain Ratin  20'  Late today, [facility], wound healing per [facility]  Objective:     Caregiver present:  No    Observation of swelling: improved visibly, skin  dry,     Volume measurements taken:  No    Skin condition is: improving    Wound nearly closed, not observed    Compression: none on, [facility]    Medication for infection:  No   Lymphedema Lower Extremity Volume Measures             DATE: 1/11/17 1/11/17             Side R L R L R L R L R L R L   Toe               + 10 cm from tip/nailbed of second toe 25.3 25.3             + 20 cm 30.0 30.2             + 30 cm 40.0 38.0             + 40 cm 46.0 46.0             + 50 cm 50.0 49.0             + 60 cm               + 70 cm               + 80 cm               + 90 cm               Top of leg ()               Hips (ASIS)               Waist / Umbilicus               Volume: 4894.83 4733.45             Loss of Volume: increased by 1% Down by .70%             Difference R to L                     .  Treatment Today     TREATMENT MINUTES COMMENTS   Evaluation     Self-care/ Home management 25 [facility] compression bandaging to to bilateral lower extremities: Applied Eucerin skin lotion, stockinet, [id number] rolls,Rosidal 01, comprilan 03 rolls.Used a small piece of Tubigrip on top to protect bandages.  Instructed to remove them if uncomfortable.Pt to don tubes if unable to rewrap, given 2 sets, reviewed use     Manual therapy 15 Manual therapy: manual lymph drainage for bilateral lower extremities lymphedema  Deep abdominal breath, neck effleurage, short neck, shoulder collectors, bilateral axilla, bilateral inguinal, anterior inguinal to axilla anastomosis on right side and left side, left lower extremity evacuation, right lower extremity evacuation, abdomen, neck effleurage.     Neuromuscular Re-education     Therapeutic Activity     Therapeutic Exercises     Gait training     Modality__________________                Total 40    Blank areas are intentional and mean the treatment did not include these items.        Shala Gracia PTA,CLT  1/16/2017

## 2021-06-08 NOTE — PROGRESS NOTES
[facility] Daily Progress     Patient Name: Nicci Pemberton  Date: 2017  Visit #: 13  PTA visit #:  10  Referral Diagnosis: Swelling:  Venous, Lymphatic and Ulcers/wounds;  Right leg and Left leg   Referring provider: Evelia De León, *  Visit Diagnosis:   No diagnosis found.      Assessment:   Pt compliant with recommended home program  Wound on L lower leg healing well  Pt ready for garment fit  Patient demonstrates understanding/independence with home program.  Patient is benefitting from skilled physical therapy and is making steady progress toward functional goals.         Goal Status:  Patient will have a decreased volume in : bilateral;LE;by 5%;to decrease risk of infection;for better fit of clothing;for ease of movement;in 4 weeks;Progressing toward  Patient will have decreased fibrosis, scar tissue for improved lymphatic mobilitiy : in 4 weeks;Progressing toward  Patient will perform, verbalize self-management of: skin care;self-monitoring;exercise;massage;self-compression;compression wear;compression care;infection prevention;in 4 weeks;Progressing toward    Plan / Patient Education:     Continue with initial plan of care. Continue with manual lymph drainage, medical compression bandages, exercises, skin care, and patient education.Pt to remove bandages if needed before next visit then don tubes for compression or trial self bandaging  See  MD orders for extended  PT 3-4x/week, up to 20 visits   Pt to continue with PT 3x/week until fit with appropriate garments and wound is healed  Pt to wear bandaging to next visit,Britt eric Wood will come to PT clinic at next visit for garment fit  Subjective:     Pain Ratin   Wound healing, very superficial    Objective:     Caregiver present: No    Observation of swelling: improved visibly, skin  dry, near symmetrical  Significant thinning at low legs    Volume measurements taken:  No    Skin condition is: improving    Wound nearly  closed, not observed    Compression: none on    Medication for infection:  No   Lymphedema Lower Extremity Volume Measures             DATE: 1/11/17 1/11/17             Side R L R L R L R L R L R L   Toe               + 10 cm from tip/nailbed of second toe 25.3 25.3             + 20 cm 30.0 30.2             + 30 cm 40.0 38.0             + 40 cm 46.0 46.0             + 50 cm 50.0 49.0             + 60 cm               + 70 cm               + 80 cm               + 90 cm               Top of leg ()               Hips (ASIS)               Waist / Umbilicus               Volume: 4894.83 4733.45             Loss of Volume: increased by 1% Down by .70%             Difference R to L                     .  Treatment Today     TREATMENT MINUTES COMMENTS   Evaluation     Self-care/ Home management 25 Wound care performed by pt. Medical compression bandaging to to bilateral lower extremities: Applied Eucerin skin lotion, stockinet, [id number] rolls,Rosidal 01, comprilan 03 rolls.Used a small piece of Tubigrip on top to protect bandages.  Instructed to remove them if uncomfortable.Pt to don tubes if unable to rewrap, given 2 sets, reviewed use     Manual therapy 35 Manual therapy: manual lymph drainage for bilateral lower extremities lymphedema  Deep abdominal breath, neck effleurage, short neck, shoulder collectors, bilateral axilla, bilateral inguinal, anterior inguinal to axilla anastomosis on right side and left side, left lower extremity evacuation, right lower extremity evacuation, abdomen, neck effleurage.     Neuromuscular Re-education     Therapeutic Activity     Therapeutic Exercises     Gait training     Modality__________________                Total 60    Blank areas are intentional and mean the treatment did not include these items.       Shala Gracia PTA,CLT  1/18/2017

## 2021-06-08 NOTE — PROGRESS NOTES
[facility] Daily Progress     Patient Name: Nicci Pemberton  Date: 2017  Visit #: 11  PTA visit #:  8  Referral Diagnosis: Swelling:  Venous, Lymphatic and Ulcers/wounds;  Right leg and Left leg   Referring provider: Pedro Ricardo MD  Visit Diagnosis:     ICD-10-CM    1. Acquired lymphedema of leg I89.0    2. Venous hypertension, chronic, with inflammation, right I87.321    3. Morbid obesity with BMI of 45.0-49.9, adult E66.01     Z68.42          Assessment:   Pt compliant with recommended home program  Wound on L lower leg healing.  Patient demonstrates understanding/independence with home program.  Patient is benefitting from skilled physical therapy and is making steady progress toward functional goals.         Goal Status:  Patient will have a decreased volume in : bilateral;LE;by 5%;to decrease risk of infection;for better fit of clothing;for ease of movement;in 4 weeks;Progressing toward  Patient will have decreased fibrosis, scar tissue for improved lymphatic mobilitiy : in 4 weeks;Progressing toward  Patient will perform, verbalize self-management of: skin care;self-monitoring;exercise;massage;self-compression;compression wear;compression care;infection prevention;in 4 weeks;Progressing toward    Plan / Patient Education:     Continue with initial plan of care. Continue with manual lymph drainage, medical compression bandages, exercises, skin care, and patient education,needs to coordinate garment fitting.  Pt to remove bandages if needed before next visit then don tubes for compression or trial self bandaging  See  MD orders for extended  PT 3-4x/week, up to 20 visits   Pt to follow up with Dr. De León on .        Subjective:     Pain Ratin    Objective:     Caregiver present: No    Observation of swelling: improved visibly, ridges R>L lower leg,  Added artiflex under Rosidal to minimize ridging    Volume measurements taken:  No    Skin condition is: improving  Wound nearly  closed    Compression:  Bandages on from last visit .    Medication for infection:  No   Lymphedema Lower Extremity Volume Measures             DATE: 1/11/17 1/11/17             Side R L R L R L R L R L R L   Toe               + 10 cm from tip/nailbed of second toe 25.3 25.3             + 20 cm 30.0 30.2             + 30 cm 40.0 38.0             + 40 cm 46.0 46.0             + 50 cm 50.0 49.0             + 60 cm               + 70 cm               + 80 cm               + 90 cm               Top of leg ()               Hips (ASIS)               Waist / Umbilicus               Volume: 4894.83 4733.45             Loss of Volume: increased by 1% Down by .70%             Difference R to L                     .  Treatment Today     TREATMENT MINUTES COMMENTS   Evaluation     Self-care/ Home management 35 Bandages removed, rerolled and legs washed Wound care to wound on L lower leg with Roseann, [facility] Absorbent bandag applied over dressing.  Medical compression bandaging to to bilateral lower extremities: Applied Eucerin skin lotion, stockinet, [id number] rolls,Rosidal 01, comprilan 03 rolls.Used a small piece of Tubigrip on top to protect bandages.  Instructed to remove them if uncomfortable.Pt to don tubes if unable to rewrap     Manual therapy 25 Manual therapy: manual lymph drainage for bilateral lower extremities lymphedema  Deep abdominal breath, neck effleurage, short neck, shoulder collectors, bilateral axilla, bilateral inguinal, anterior inguinal to axilla anastomosis on right side and left side, left lower extremity evacuation, right lower extremity evacuation, abdomen, neck effleurage.     Neuromuscular Re-education     Therapeutic Activity     Therapeutic Exercises     Gait training     Modality__________________                Total 60    Blank areas are intentional and mean the treatment did not include these items.       Shala Gracia PTA,CLT  1/13/2017

## 2021-06-08 NOTE — PROGRESS NOTES
[facility]:  Non-Surgical Weight Loss Intake Appointment   Date of visit: 1/13/2017  Physician: Darron Andujar MD  Primary Care is Pedro Ricardo MD.  Nicci Pemberton   56 y.o.  female  Nicci is a 56 y.o. year old female who is here today for consultation regarding non-surgical weight loss.   she was referred to the [facility] by Dr. De León.    Weight History:   Wt Readings from Last 3 Encounters:   01/13/17 (!) 250 lb (113.4 kg)   11/23/16 (!) 255 lb (115.7 kg)     Body mass index is 46.1 kg/(m^2).       Assessment and Plan   Assessment: Nicci Pemberton is a 56 y.o.,female presenting for assistance with medical weight loss.  Identified issues contributing to her excess weight include:     Ms. Pemberton has had a fairly sedentary day to day existence most of her life and enjoys cooking/baking.  She does have some likely chronic sleep deprivation that arises from getting up a couple hours earlier than she would if not for her morning routine with her  and later bedtime of midnight.  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 She is motivated for change and has already lost a reported 13 lbs since modifying her diet/habits and working with Dr. De León in the lymphedema clinic, nearly 5% weight loss.       Her lymphedema and knee pain limits her mobility as far as vigorous exercise but she can shop/stand for longer periods of time, although not to the degree she could in her earlier years.    She's tried phentermine in the distant past and tolerated it well, she's interested in trying it again today and given her lack of hypertension, etc we'll initiate phentermine at half a tablet daily with breakfast.      Plan:  1.  Behavior Goals: 3 daily meals plan, ideally with a large breakfast, medium lunch        and smaller dinner. Avoidance of sugared-sweetened beverages and processed        carbohydrate snacks.  Work towards pre-planning meals  to avoid falling back into old             habits/obesogenic habits.  Daily Food Tracking and morning weight recommended.  Weekly       check-in through [facility] to increase compliance.    2.  Diet Goals: Recommend a 1300Calorie daily restriction.  Increased protein intake to insure at        least 20-30 grams of protein per meal.      3.  Exercise/Activity Goals: walk 15 minutes daily,.  Long term goal of increasing endurance to allow for at least            200 minutes weekly of moderate exercise to maintain weight loss.      4.  Recommendation regarding weight loss medication:  Phentermine half a tablet daily with breakfast. Follow up in one month to check tolerance/efficacy and she'll check in through [employer] if issues arise/questions.    5.  Referral to Dietician for further education and customization of diet plan.    6.  Stress Reduction: doing well    7.  Intake Labs:  Ordered, plans to have them drawn at Dr. Ricardo's office.        There are no diagnoses linked to this encounter.     No Follow-up on file.     Weight and Lifestyle History    Sleep history:  Hours per night: Goes to bed around midnight, awakes to  getting up and then has breakfast together, stays up.   Weekends sleeps to 9am. Goes to work at 9am (mon-Fri), working as a  at [employer], ends at 5pm. Dinner is at 6:30pm. Quiet time in the evenings.  Snoring/apnea/insomnia? Some snoring if on back or congested.  Restful/refreshed? Sometimes.  Shift work? no  CPAP/BiPAP? no  Sleep study previously? no  TV in bedroom? Yes, never on.  Sleep aids/pills? no      STOP-BANG score for sleep apnea : 2  For general population   DIXIE - Low Risk : Yes to 0 - 2 questions  DIXIE - Intermediate Risk : Yes to 3 - 4 questions  DIXIE - High Risk : Yes to 5 - 8 questions  or Yes to 2 or more of 4 STOP questions + male gender  or Yes to 2 or more of 4 STOP questions + BMI > 35kg/m2  or Yes to 2 or more of 4 STOP questions + neck circumference 17  inches / 43cm in male or 16 inches / 41cm in female    Weight History:  In what way is your excess weight affecting your wellness/health? Increased knee pains in the last year.   Heaviest weight: Max weight 260 lbs. 9/16 she thinks.  Any Previous weight loss, what was the most lost and method: no. Pregnant with oldest son weight (lost weight through pregancy) 1988 lost 23 lbs.  Birth weIight, High School graduation weight: always a little bigger, 135-150lbs.  Lowest adult weight: 150s.  Maternal health/smoking/weight: smoker.   When did you start gaining weight and what were the circumstances? Gradual gain through the years. Loves cooking and eating.   Is anyone else in your immediate family overweight? yes  Finally,  Is there a weight you desire to be, what is your goal weight that would define successful weight loss for you?  50 lbs or more.   I would like to lose weight so I can  :  Do the things I used to do and buy normal clothes that I like   .     Barriers to weight loss:  Is anyone else at home/work/family a barrier to your weight loss? No. Fairly supportive.  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 Some adult kid stress (she's a worrier).   Mobility problems: some knee pains. Can shop without stopping.     Counseled on health benefits of weight loss, goals for metabolic/diabetic risk reduction, HTN reduction, improved sleep and mobility, cancer reduction, longevity.    Fitness History:  No athletics. Hobbies included antiquing/crafting. Yard work (lake home in [place]).  Counseled on physiologic benefits of exercise and for long term weight maintenance, goal working towards 200-300 minutes weekly.     Food History:  Who buys groceries?  Shared.  Do you eat at dinner table, is the TV on or off, family present? Table eater. At work eats at her desk.  Any soda, how much? Yes.  Water mostly. Diet soda ginger ale or 7-up (cans).   Meals per day? 3  Snacks per day?  2    Food sensitivities/allergies/intolerances/avoidances: allergy to stone fruit and cored fruits: (apples/peaches/pears/cherries) gets itchy in throat. Can eat if cooked/frozen.  Water per day? Increased lately, up to 32-40 oz.    Any binging behavior?no  Any induced vomiting/purging? no  Any history of anorexia/bulimia? no  Any night eating issues? no  Stress induced eating? no    Goal Setting:  Short Term Goals 25 lbs is 10%, 225 lbs.  Long Term Goals under 200 lbs.Answers for HPI/ROS submitted by the patient on 1/10/2017   BARIATRIC NEW PATIENT  Interested in Surgery?: No  [place] About?: Specialty Care Doctor  Barriers to learning:: No  Attended Seminar?: No  PCP:: Pedro Ricardo MD, [facility], [address] [place] [address] , [phone]       Fax unknown  Specialist:: Evelia De León MD, [facility], [address] [place] [address], [address], [place]  [address], [phone]       Fax unknown  Mental Health Provider:: None  Present Ht:: 63  Present Wt:: 250  Age Overweight:: 30  Age 100lbs:: 52  Wt 18yrs:: 125  Wt 5yrs ago:: 220  # of Wt Loss Efforts:: 0  Prescribed Wt Loss Meds?: Yes  Prescribed Meds:: Phentermine (?)  OTC Wt Loss Meds?: No           Patient Profile   Social History     Social History Narrative    .  2 boys.  .  HR.      Family History   Problem Relation Age of Onset     No Medical Problems Mother      Restless legs syndrome Father      Mental illness Sister      Alcohol abuse Brother      No Medical Problems Son      Mental illness Maternal Grandfather      Heart failure Paternal Grandmother      No Medical Problems Son      No Medical Problems Maternal Grandmother           Past Medical History   Patient Active Problem List   Diagnosis     Edema     Cellulitis of left lower extremity     Hypothyroidism     Venous hypertension, chronic, with ulcer and inflammation, left     Venous hypertension, chronic, with inflammation, right      "Acquired lymphedema of leg     Peripheral neuropathy     Morbid obesity with BMI of 45.0-49.9, adult     Vitamin D deficiency     Sulfa (sulfonamide antibiotics) and Lasix [furosemide]  Current Outpatient Prescriptions   Medication Sig Note     ascorbic acid, vitamin C, (VITAMIN C) 100 MG tablet Take 500 mg by mouth daily.      calcium-vitamin D 500 mg(1,250mg) -200 unit per tablet Take 1 tablet by mouth daily.      cholecalciferol, vitamin D3, 1,000 unit tablet Take 1,000 Units by mouth daily.      levothyroxine (SYNTHROID, LEVOTHROID) 125 MCG tablet Take 125 mcg by mouth daily.      spironolactone (ALDACTONE) 100 MG tablet Take 200 mg by mouth daily. 1/13/2017: She takes it to help her swelling.       Past Surgical History  She has no past surgical history on file.     Examination   Visit Vitals     /66     Pulse 89     Ht 5' 1.75\" (1.568 m)     Wt (!) 250 lb (113.4 kg)     SpO2 94%     BMI 46.1 kg/m2     Height: 5' 1.75\" (1.568 m) (1/13/2017  2:37 PM)  Weight: 250 lb (113.4 kg) (1/13/2017  2:37 PM)  BMI (Calculated): 46.1 (1/13/2017  2:37 PM)  SpO2: 94 % (1/13/2017  2:37 PM)  General:  Alert and ambulatory, sleep shiners present.   HEENT:  No conjunctival pallor, moist mucous Membranes, neck is supple. No acanthosis evident.  Pulmonary:  Normal respiratory effort, no cough, no audible wheezes/crackles.  CV:  Regular rate and Rhythm, no murmurs, pulses 2 plus regular.  Soft 1/6 systolic heart murmur intermittently present.  Abdominal: obese, soft, non tender no rashes/guarding. Small excoriation midline 3mm in size.  Extremities: lymphedema wraps in place. Ambulates without assistance, no tremor.   Skin:  Warm/dry, no rash or pallor. Dark circles under eyes.  Pscyh/Mood: pleasant, motivated for change.                                    LABS: ordered most recent available labs are below and notable for vitamin D def, low platelets and slight lft abnormalities. Will see how these look on new labs.    Last " recorded labs include:  Lab Results   Component Value Date    WBC 5.9 2016    HGB 12.9 2016    HCT 36.7 2016     (H) 2016    PLT 93 (L) 2016      Lab Results   Component Value Date    VMIWYZLV85QK 22.7 (L) 2016    No results found for: HGBA1C   Lab Results   Component Value Date    CHOL 171 2015    No results found for: PTH      No results found for: FERRITIN   Lab Results   Component Value Date    HDL 47 2015      No results found for: VXJMKEZH66 No results found for: 24444   Lab Results   Component Value Date    LDLCALC 98 2015    Lab Results   Component Value Date    TSH 4.08 2016    No results found for: FOLATE   Lab Results   Component Value Date    TRIG 132 2015    Lab Results   Component Value Date    ALT 47 (H) 2015    AST 72 (H) 2015    ALKPHOS 109 2015    BILITOT 1.6 (H) 2015    No results found for: TESTOSTERONE     No components found for: CHOLHDL No results found for: 7597   @resusfast(vitamin a: 1)@       Other Notables/imaging:     Counselin minutes spent in direct consultation with the patient regarding conditions contributing to excess weight accumulation, with over 50% of the time spent in counseling, goal setting and initiating a plan to lose their excess weight.    Darron Andujar MD  [facility].  2017

## 2021-06-09 ENCOUNTER — ANCILLARY PROCEDURE (OUTPATIENT)
Dept: GENERAL RADIOLOGY | Facility: CLINIC | Age: 61
End: 2021-06-09
Attending: INTERNAL MEDICINE
Payer: COMMERCIAL

## 2021-06-09 DIAGNOSIS — Z94.4 LIVER REPLACED BY TRANSPLANT (H): ICD-10-CM

## 2021-06-09 DIAGNOSIS — Z46.89 ENCOUNTER FOR REMOVAL OF BILIARY STENT: ICD-10-CM

## 2021-06-09 DIAGNOSIS — T86.41 LIVER TRANSPLANT REJECTION (H): ICD-10-CM

## 2021-06-09 LAB
ALBUMIN SERPL-MCNC: 3.7 G/DL (ref 3.4–5)
ALP SERPL-CCNC: 103 U/L (ref 40–150)
ALT SERPL W P-5'-P-CCNC: 23 U/L (ref 0–50)
ANION GAP SERPL CALCULATED.3IONS-SCNC: 6 MMOL/L (ref 3–14)
AST SERPL W P-5'-P-CCNC: 24 U/L (ref 0–45)
BILIRUB DIRECT SERPL-MCNC: 0.4 MG/DL (ref 0–0.2)
BILIRUB SERPL-MCNC: 1.8 MG/DL (ref 0.2–1.3)
BUN SERPL-MCNC: 24 MG/DL (ref 7–30)
CALCIUM SERPL-MCNC: 9.3 MG/DL (ref 8.5–10.1)
CHLORIDE SERPL-SCNC: 108 MMOL/L (ref 94–109)
CO2 SERPL-SCNC: 27 MMOL/L (ref 20–32)
CREAT SERPL-MCNC: 1.08 MG/DL (ref 0.52–1.04)
CYCLOSPORINE BLD LC/MS/MS-MCNC: 245 UG/L (ref 50–400)
ERYTHROCYTE [DISTWIDTH] IN BLOOD BY AUTOMATED COUNT: 12.6 % (ref 10–15)
GFR SERPL CREATININE-BSD FRML MDRD: 55 ML/MIN/{1.73_M2}
GLUCOSE SERPL-MCNC: 93 MG/DL (ref 70–99)
HCT VFR BLD AUTO: 37.6 % (ref 35–47)
HGB BLD-MCNC: 12.8 G/DL (ref 11.7–15.7)
MCH RBC QN AUTO: 34.1 PG (ref 26.5–33)
MCHC RBC AUTO-ENTMCNC: 34 G/DL (ref 31.5–36.5)
MCV RBC AUTO: 100 FL (ref 78–100)
PLATELET # BLD AUTO: 141 10E9/L (ref 150–450)
POTASSIUM SERPL-SCNC: 4 MMOL/L (ref 3.4–5.3)
PROT SERPL-MCNC: 7.3 G/DL (ref 6.8–8.8)
RBC # BLD AUTO: 3.75 10E12/L (ref 3.8–5.2)
SODIUM SERPL-SCNC: 140 MMOL/L (ref 133–144)
TME LAST DOSE: NORMAL H
WBC # BLD AUTO: 6.3 10E9/L (ref 4–11)

## 2021-06-09 PROCEDURE — 99000 SPECIMEN HANDLING OFFICE-LAB: CPT | Performed by: PATHOLOGY

## 2021-06-09 PROCEDURE — 80076 HEPATIC FUNCTION PANEL: CPT | Performed by: PATHOLOGY

## 2021-06-09 PROCEDURE — 80048 BASIC METABOLIC PNL TOTAL CA: CPT | Performed by: PATHOLOGY

## 2021-06-09 PROCEDURE — 36415 COLL VENOUS BLD VENIPUNCTURE: CPT | Performed by: PATHOLOGY

## 2021-06-09 PROCEDURE — 80158 DRUG ASSAY CYCLOSPORINE: CPT | Mod: 90 | Performed by: PATHOLOGY

## 2021-06-09 PROCEDURE — 74019 RADEX ABDOMEN 2 VIEWS: CPT | Mod: GC | Performed by: RADIOLOGY

## 2021-06-09 PROCEDURE — 85027 COMPLETE CBC AUTOMATED: CPT | Performed by: PATHOLOGY

## 2021-06-09 NOTE — PROGRESS NOTES
"Non-surgical Weight Loss Initial Diet Evaluation     Assessment:  Pt is a 56 y.o. female being seen today for non-surgical RD nutritional evaluation. Today we reviewed current eating habits and level of physical activity, and instructed on the changes that are required for successful weight loss outcomes.    Personal Goals: Pt would like to lose weight; feel good - knee issues  Personal goal weight: Ideally under 200lbs; longest weight as an adult 150-165lbs    Phentermine: 1/2 tab in AM 1/2 tab in PM    Pt's Initial Weight: 250 lbs  Weight: 225 lb 12.8 oz (102.4 kg)  Weight loss from initial: 24.2  % Weight loss: 9.68 %  BMI: Body mass index is 41.63 kg/(m^2).  IBW: 109 lbs    Estimated RMR (Rutherford-St Jeor equation): 1564 calories  Protein requirements (.5grams to .9grams per pound IBW, 20-30% of calories, minimum of 60-80gm per day):  55-98 grams     Food allergies, intolerances, [employer] customs: fruits with core/pit  (apple/pear/peaches/plums) - can have cooked and frozen    Vitamins/Mineral Supplementation: Vitamin D - recently stopped vitamin C (cold sores on lips)     Biggest struggle with weight loss: Lots of eat out food 10 years; hard to change habits after years of poor choices    Who does the grocery shopping for your household? Self and   Who prepares your meals at home? Self  ++Lives with  - on board with nutrition changes     Diet Recall/Time: wakes 545am  Breakfast: skips   Am Snack: 1 egg and jesenia (7g) OR yogurt (6g) - just switched to greek    Lunch: deli turkey sandwich OR salad (just veggies and dressing)  Pm snack: fruit   Dinner: Pro/CHO/Veg \"CHO are the hardest\"  HS Snack: none  ++previously eating sweets and fried foods daily    Typical Snacks: fruit or frozen fruit bar     Meal preparation methods: air fryer, grill, bake   Fats used at home: canola or olive oil    Fried Foods: 1 times per week    Meals per week away from home: 1-2   Sit down: 1-2X  Delivery: chinese and " pizza  Specialty Coffee: none  Ice Cream/FrozenYogurt/Bakery: none    Recommended limiting eating out to no more than 2x/week.  Patient and I reviewed the importance of eating three consistent meals per day; as well as meal timing to be spaced 4-5 hours apart.  Snack choices: 100-150 calories (1-2x/day if physically hungry), incorporating a fruit/vegetable w/ protein source.    Portion Sizes problematic? yes per patient/diet recall  Encouraged slowing meal times down, 20-30 minutes, chewing to applesauce consistency.   To aid in proper portion control and slow meal time down discussed consuming meals off smaller plates, use toddler/children utensils and set utensils down after each bite.    Protein, vegetables/fruits, carbohydrates:   Reviewed lean protein sources today. Recommended consuming 20gm protein at 3 meals daily.  The patient and I discussed the importance of including lean/low fat protein at each meal and limiting carbohydrate intake to less than 25% of plate volume.     Beverages (Type/Oz. per day)  Water: 80oz  Coffee: on weekends  Tea: iced tea - sweetened (only in summer)  Milk: 8-12oz.day  Regular soda: none  Diet soda: 2X/week  Juice: cut back to 1-2X/week  Miky-Aid/lemonade/etc: none  Alcohol: weekends 2-4 drinks    Discussed the importance of adequate hydration and the goal of 64+ oz of fluid daily.   The patient understands the importance of avoiding all alcoholic and sweetened drinks, and instead choosing 64 oz plain water.    Exercise  ADL - trying to walk more    Pt's understands that 45-60 minutes of daily activity is an important part of weight loss success.   Encouraged pt to incorporate upper body strength training exercise, even if its lifting soup cans while watching tv at night, doing push ups/sit-ups, and abdominal work.    PES statement:    1. (NI-1.3)Excessive energy intake related to Food and nutrition related knowledge deficit concerning excessive energy/oral intake as evidenced by  Intake of high caloric density foods/beverages (juice, alcohol) at meals and/or snacks; large portion; Estimated intake that exceeds estimated daily energy intake; Frequent excessive fast food or restaurant intake; and BMI 41.63    2. (NC-3.3.5) Obese, class III, BMI ?40 related to physical inactivity as evidenced by Infrequent, low-duration and or low intensity physical activity; and Large amounts of sedentary activities; no structured physical activity regimen     Intervention  Discussion:  1. Educated pt on Eat Better, Move More, Live Well: Non-surgical Weight Loss Handout  2. Recommended to consume 20gm protein at 3 meals daily. 55-98 grams daily total.  3. Read food labels more consistently: keeping total fat grams <10, total sugar grams <10,  saturated fat <3gm per serving, fiber >3gm per serving.     Instructions/Goals:   1. Include 20gm protein at each meal.  2. Increase vegetable/fruit intake, by having a vegetable or fruit with each meal daily. Recommended pt to increase vegetable/fruit intake to 4-5 servings daily.  3. Increase fluid intake to 64oz daily: choose plain or calorie/alcohol-free beverages.  4. Incorporate daily structured activity, 45-60 minutes most days of the week  5. Read food labels more consistently: keeping total fat grams <10, total sugar grams <10,  saturated fat <3gm per serving, fiber >3gm per serving.   6. Practice plate method: 1/2 plate lean/low fat protein source, vegetable/fruit, <25% of plate complex carbohydrates.  7. Practice eating off of smaller plates/bowls, chewing to applesauce consistency, taking 20-30 minutes to eat in a calm/relaxed environment without distractions of tv/email/cell phone.    Handouts Provided:  Eat Better, Move More, Live Well: Non-surgical Weight Loss Handout    Monitor/Evaluation:    Pt will f/u in one month with bariatrician, and f/u in two to three months with RD.    Plan for next visit with RD:  Plate method and give food journal  homework.  Educate pt on food labels  Review carbohydrates/fiber  Exercise    Time In: 8:10a  Time Out: 9:00a      ABN signed: Yes

## 2021-06-10 RX ORDER — CYCLOSPORINE 25 MG/1
50 CAPSULE, LIQUID FILLED ORAL EVERY 12 HOURS
Qty: 360 CAPSULE | Refills: 3 | Status: SHIPPED | OUTPATIENT
Start: 2021-06-10 | End: 2021-06-25

## 2021-06-10 NOTE — PROGRESS NOTES
Date of Service:  17    Date last seen by Dr. De León:  02/15/2017    PCP: Pedro Ricardo MD    Impression:  1. Venous hypertension of the legs bilaterally  2. Venous ulceration of the left anterior shin-healed  3. Scarring and fibrosis of legs bilaterally  4. Acquired lymphedema of the legs bilaterally  5. Peripheral neuropathy per exam  6. Morbid obesity-improving  7. Vit D deficiency     Plan:  1. Questions were answered.  2. Continue compression and exercise.    3. On vit D replacement.  4. Working with bariatric medicine and has lost 30 pounds.   5. Legs were measured.  [employer] catalogue was provided with recommendations for compression stockings. (43/136 XL)  6. Patient will follow up in 8 months or when needed.    Time spent with patient 15 minutes with greater than 50% time in consultation, education and coordination of care.     ---------------------------------------------------------------------------------------------------------------------    Chief Complaint: Bilateral leg swelling and left anterior calf ulceration     History of Present Illness:     Nicci Pemberton returns to the [facility] with bilateral leg swelling and a left anterior calf ulceration.  This is felt to be due to venous hypertension of the legs bilaterally which is long-standing.  There is secondary acquired lymphedema.  She has some peripheral neuropathy and is morbidly obese. The left calf ulcer has healed.  She wears her compression daily and is doing very well.  She had therapy.  She is exercising  She continues to work with [employer] and has lost 30 pounds.  Her legs are much better.  There is no significant pain.  There has been no new numbness, tingling or weakness. There have been no new masses, rashes, or swellings of any other joints. There has been no new decrease in appetite, unexplained weight loss, abdominal bloating and bowel or bladder changes.  She has had no fevers.      Past Medical  History:   Diagnosis Date     Abnormal LFTs      Bruising      Cellulitis     L leg     Edema of both legs      Fatty liver      Fatty metamorphosis      Hypothyroid      Measles      Mumps      Urinary incontinence      Zinc deficiency        History reviewed. No pertinent surgical history.      Current Outpatient Prescriptions:      calcium-vitamin D 500 mg(1,250mg) -200 unit per tablet, Take 1 tablet by mouth daily., Disp: , Rfl:      cholecalciferol, vitamin D3, 1,000 unit tablet, Take 1,000 Units by mouth daily., Disp: , Rfl:      levothyroxine (SYNTHROID, LEVOTHROID) 125 MCG tablet, Take 125 mcg by mouth daily., Disp: , Rfl:      lysine 500 mg Tab, , Disp: , Rfl:      phentermine (ADIPEX-P) 37.5 mg tablet, Take 1/2 to 1 tablet in the morning. (Patient taking differently: 2 (two) times a day. Take 1/2 tablet in the morning. Take 1/2 tablet in the afternoon.), Disp: 90 tablet, Rfl: 1     spironolactone (ALDACTONE) 100 MG tablet, Take 200 mg by mouth daily., Disp: , Rfl:      ascorbic acid, vitamin C, (VITAMIN C) 100 MG tablet, Take 500 mg by mouth daily., Disp: , Rfl:      phentermine (ADIPEX-P) 37.5 mg tablet, Start with 1/2 a tablet daily (18.75mg) for 14 days, if tolerating, go up to one full tablet daily on Day 15 and continue as tolerated., Disp: 30 tablet, Rfl: 0     valACYclovir (VALTREX) 1000 MG tablet, TK 1 T PO BID FOR 7 DAYS, Disp: , Rfl: 1    Allergies   Allergen Reactions     Sulfa (Sulfonamide Antibiotics) Other (See Comments)     Joint swelling     Lasix [Furosemide] Rash       Social History     Social History     Marital status:      Spouse name: N/A     Number of children: N/A     Years of education: N/A     Occupational History     Not on file.     Social History Main Topics     Smoking status: Former Smoker     Packs/day: 1.00     Years: 35.00     Types: Cigarettes     Quit date: 10/20/2011     Smokeless tobacco: Never Used     Alcohol use 0.6 oz/week     1 Cans of beer per week       Comment: social     Drug use: No     Sexual activity: Yes     Partners: Male     Birth control/ protection: Post-menopausal     Other Topics Concern     Not on file     Social History Narrative    .  2 boys.  .  HR.        Family History   Problem Relation Age of Onset     No Medical Problems Mother      Restless legs syndrome Father      Mental illness Sister      Alcohol abuse Brother      No Medical Problems Son      Mental illness Maternal Grandfather      Heart failure Paternal Grandmother      No Medical Problems Son      No Medical Problems Maternal Grandmother        Review of Systems:  Nicciedel Pemberton no new numbess, tingling or weakness, redness or rashes, fevers, new masses, abdominal bloating or discomfort, unexplained weight loss, increased pain, new ulcers, shortness of breath and chest pain  Full 12 point review of systems was completed.    Imaging:  None    Labs:       Lab Results   Component Value Date    CREATININE 0.69 02/15/2017      Lab Results   Component Value Date    HGBA1C 4.2 02/15/2017           Lab Results   Component Value Date    BUN 12 02/15/2017              Lab Results   Component Value Date    ALBUMIN 2.9 (L) 02/15/2017       Vitamin D, Total (25-Hydroxy)   Date Value Ref Range Status   11/23/2016 22.7 (L) 30.0 - 80.0 ng/mL Final         Physical Exam:  Vitals:    04/24/17 0804   BP: 98/52   Pulse: 84   Resp: 16   Temp: 98.6  F (37  C)    There is no height or weight on file to calculate BMI.    Circumferential measures:    Vasc Edema 12/1/2016 1/4/2017 1/16/2017 2/15/2017 4/24/2017   Right just above MTP 26.0 27 25.8 25.3 24.5   Right Ankle 29.2 33.5 29 28 28   Right Widest Calf 42.6 53.5 47.2 45.3 45.5   Right Thigh Up 10cm - 67.5 67.5 67.5 63   Left - just above MTP 25.2 26.7 26.2 24.5 24.7   Left Ankle 29.6 35.7 30.2 29 28.8   Left Widest Calf 44.4 53.5 47 45.3 45.3   Left Thigh Up 10cm - 66.5 66.5 66.5 61     Swelling measures continue to come down very  nicely.    General:  56 y.o. female in no apparent distress.  Alert and oriented x 3.  Cooperative. Affect normal.    Musculoskeletal: Normal range of motion in hips, knees and ankles bilaterally throughout . There is no active joint synovitis, erythema, swelling or joint laxity.      Neurological: Sensation is decreased to pin prick and light touch in a stocking distribution. Strength testing is normal in knee flexion, knee extension, ankle dorsiflexion and great toe extension bilaterally.        Vascular: Dorsalis pedis and posterior tibialis pulses are strong and equal bilaterally. There are no significant telangietasias, medial ankle venous flares, venous varicosities and spider veins . There is normal capillary refill.      Integumentary: Skin of both legs is uniformly warm and dry and hyperpigmentated, with hyperkeratosis and keratoderma and with positive Stemmer's Sign.  The erythema has resolved.   There is some fibrosis of the feet and toes.   Nails are thickened. The ulceration along the mid right anterior shin has healed.      Evelia De León MD, ABWMS, FACCWS, [facility]  Medical Director Wound Care and Lymphedema  [facility]  [phone]

## 2021-06-10 NOTE — TELEPHONE ENCOUNTER
ISSUE:   Cyclosporine level 245  on 6/9, goal 100-150, dose 75 mg BID.    PLAN:   Please call patient and confirm this was an accurate 12-hour trough. Verify Cyclosporine dose 75 mg BID. Confirm no new medications or illness. Confirm no missed doses. If accurate trough and accurate dose, decrease Cyclosporine dose to 50 mg BID and repeat labs as scheduled.   OUTCOME:   Spoke with patient, they confirm accurate trough level and current dose 75 mg BID. Patient confirmed dose change to 50 mg BID and to repeat labs in 2 months. Orders sent to preferred pharmacy for dose change and lab for repeat labs. Patient voiced understanding of plan.     Lauren Zuluaga LPN

## 2021-06-11 NOTE — PROGRESS NOTES
"Bariatric Clinic Follow-Up Visit:    Nicci Pemberton is a 56 y.o.  female with Body mass index is 43.18 kg/(m^2).  presenting here today for follow-up on non-surgical efforts for weight loss. Original Intake visit occurred on 1/13/17 with a weight of 250lbs.  Along with diet and behavior changes, she has been using phentermine to assist her weight loss goals.  See her intake visit notes for details on identified contributors to weight gain in the past.    Weight:   Wt Readings from Last 2 Encounters:   06/07/17 (!) 236 lb 1.6 oz (107.1 kg)   02/27/17 (!) 225 lb 12.8 oz (102.4 kg)    pounds  Height: 5' 2\" (1.575 m) (6/7/2017  9:15 AM)  Initial Weight: 250 lbs (6/7/2017  9:15 AM)  Weight: 236 lb 1.6 oz (107.1 kg) (6/7/2017  9:15 AM)  Weight loss from initial: 13.9 (6/7/2017  9:15 AM)  % Weight loss: 5.56 % (6/7/2017  9:15 AM)  BMI (Calculated): 43.2 (6/7/2017  9:15 AM)  SpO2: 100 % (6/7/2017  9:15 AM)    Comorbidities:  Patient Active Problem List   Diagnosis     Edema     Hypothyroidism     Venous hypertension, chronic, with ulcer and inflammation, left     Venous hypertension, chronic, with inflammation, right     Acquired lymphedema of leg     Peripheral neuropathy     Morbid obesity with BMI of 40.0-44.9, adult     Vitamin D deficiency     Zinc deficiency       Current Outpatient Prescriptions:      cholecalciferol, vitamin D3, 1,000 unit tablet, Take 1,000 Units by mouth daily., Disp: , Rfl:      levothyroxine (SYNTHROID, LEVOTHROID) 125 MCG tablet, Take 125 mcg by mouth daily., Disp: , Rfl:      lysine 500 mg Tab, , Disp: , Rfl:      phentermine (ADIPEX-P) 37.5 mg tablet, Take 1/2 to 1 tablet in the morning. (Patient taking differently: 2 (two) times a day. Take 1/2 tablet in the morning. Take 1/2 tablet in the afternoon.), Disp: 90 tablet, Rfl: 1     spironolactone (ALDACTONE) 100 MG tablet, Take 200 mg by mouth daily., Disp: , Rfl:      ascorbic acid, vitamin C, (VITAMIN C) 100 MG tablet, Take 500 mg by mouth " daily., Disp: , Rfl:      calcium-vitamin D 500 mg(1,250mg) -200 unit per tablet, Take 1 tablet by mouth daily., Disp: , Rfl:      zinc gluconate 50 mg tablet, Take 1 tablet (50 mg total) by mouth daily. Once completed, make sure multivitamin contains zinc to maintain levels., Disp: 30 tablet, Rfl: 0      Interim: Since our last visit when she was 235 lbs, she saw the dietician and had lost another 10 lbs down to 225 lbs.  Since then, she has regained some weight due to wedding and graduation party season.    Plan:   1.  Diet: aiming for 1500 kcal max  2. Exercise: will start back up  3. Medication: phentermine going well, BP good today, didn't need refill  4.  Stress Reduction:  Doing well  5. Goals: Discussed optimizing Nonsurgical weight loss as she likes but to entertain the possibility of looking into surgical weight loss more if she struggles to get her BMI below 40.   6. Recheck labs that were abnormal at intake in January (Zn, LFTs, plts).      We discussed [facility] Bariatric Basics including:  -eating 3 meals daily  -eating protein first  -eating slowly, chewing food well  -avoiding/limiting calorie containing beverages  -We discussed the importance of restorative sleep and stress management in maintaining a healthy weight.  -We discussed the National Weight Control Registry healthy weight maintenance strategies and ways to optimize metabolism.  -We discussed the importance of physical activity including cardiovascular and strength training in maintaining a healthier weight and explored viable options.    Most recent labs:  Lab Results   Component Value Date    WBC 5.9 02/15/2017    HGB 13.4 02/15/2017    HCT 38.5 02/15/2017     (H) 02/15/2017    PLT 73 (L) 02/15/2017     Lab Results   Component Value Date    CHOL 171 07/24/2015     Lab Results   Component Value Date    HDL 47 07/24/2015     Lab Results   Component Value Date    LDLCALC 98 07/24/2015     Lab Results   Component Value Date    TRIG  "132 07/24/2015     No components found for: CHOLHDL  Lab Results   Component Value Date    ALT 56 (H) 02/15/2017    AST 91 (H) 02/15/2017    ALKPHOS 122 (H) 02/15/2017    BILITOT 2.3 (H) 02/15/2017     Lab Results   Component Value Date    HGBA1C 4.2 02/15/2017     Lab Results   Component Value Date    OIMSXMDE95 >2000 (H) 02/15/2017     Lab Results   Component Value Date    NOUSQFGO02ME 22.7 (L) 11/23/2016     No results found for: FERRITIN  Lab Results   Component Value Date    PTH 26 02/15/2017     No results found for: 46334  No results found for: 7597  Lab Results   Component Value Date    TSH 4.08 09/27/2016     No results found for: TESTOSTERONE    DIETARY HISTORY    Positive Changes Since Last Visit: after some indiscretion over the last 2 months is back on track with her eating.  Struggling With: parties    Knowledgeable in Reading Food Labels: yes  Getting Adequate Protein: no  Sleeping 7-8 hours/day oten  Stress management doing well    PHYSICAL ACTIVITY PATTERNS:  Cardiovascular: will get on 3 day a week plan  Strength Training: see above    REVIEW OF SYSTEMS  GENERAL/CONSTITUTIONAL:  No illness  HEENT:   nl  CARDIOVASCULAR:   nl  PULMONARY:   nl  GASTROINTESTINAL:  nl  UROLOGIC:  nl  NEUROLOGIC:  nl  PSYCHIATRIC:  nl  MUSCULOSKELETAL/RHEUMATOLOGIC   nl  ENDOCRINE:  nl  DERMATOLOGIC:  nl    PHYSICAL EXAM:  Vitals: /55 (Patient Site: Right Arm, Patient Position: Sitting, Cuff Size: Adult Large)  Pulse 78  Ht 5' 2\" (1.575 m)  Wt (!) 236 lb 1.6 oz (107.1 kg)  SpO2 100%  Breastfeeding? No  BMI 43.18 kg/m2  Height: 5' 2\" (1.575 m) (6/7/2017  9:15 AM)  Initial Weight: 250 lbs (6/7/2017  9:15 AM)  Weight: 236 lb 1.6 oz (107.1 kg) (6/7/2017  9:15 AM)  Weight loss from initial: 13.9 (6/7/2017  9:15 AM)  % Weight loss: 5.56 % (6/7/2017  9:15 AM)  BMI (Calculated): 43.2 (6/7/2017  9:15 AM)  SpO2: 100 % (6/7/2017  9:15 AM)    GEN: Pleasant, well groomed, in no acute distress  HEENT: AUDREY, EOMI, airway " patent .  NECK:  no anterior/supraclavicular lymphadenopathy, thyroid normal   HEART: Rhythm regular, rate regular, no murmur   LUNGS: Clear without crackles or wheezes. No cough.  ABDOMEN: obese.  EXTREMITIES: 2 plus palpable peripheral pulses, radial. No tremor.  NEURO: Alert and Oriented X3, normal gait and speech.  SKIN: No visible pallor or jaundice.        25 minutes was spent in direct consultation, with over 50% of it spent in counseling regarding their plan for excess weight reduction and health modification.  Darron Andujar MD  [facility]  9:08 AM

## 2021-06-12 NOTE — PROGRESS NOTES
"Bariatric Clinic Follow-Up Visit:    Nicci Pemberton is a 57 y.o.  female with There is no height or weight on file to calculate BMI.  presenting here today for follow-up on non-surgical efforts for weight loss. Original Intake visit occurred on 1/13/17 with a weight of 250 lbs.  Along with diet and behavior changes, she has been using phentermine to assist her weight loss goals.  See her intake visit notes for details on identified contributors to weight gain in the past.  Chart review shows she last saw the dietician on 8/4/17 and was down to 213 lbs, nearly a 15% weight reduction.    Weight:   Wt Readings from Last 2 Encounters:   08/04/17 213 lb 8 oz (96.8 kg)   06/07/17 (!) 236 lb 1.6 oz (107.1 kg)    pounds  Height: 5' 2\" (1.575 m) (8/4/2017  8:00 AM)  Initial Weight: 250 lbs (8/4/2017  8:00 AM)  Weight: 213 lb 8 oz (96.8 kg) (8/4/2017  8:00 AM)  Weight loss from initial: 36.5 (8/4/2017  8:00 AM)  % Weight loss: 14.6 % (8/4/2017  8:00 AM)  BMI (Calculated): 39 (8/4/2017  8:00 AM)  SpO2: 100 % (6/7/2017  9:15 AM)    Comorbidities:  Patient Active Problem List   Diagnosis     Edema     Hypothyroidism     Venous hypertension, chronic, with ulcer and inflammation, left     Venous hypertension, chronic, with inflammation, right     Acquired lymphedema of leg     Peripheral neuropathy     Morbid obesity with BMI of 40.0-44.9, adult     Vitamin D deficiency     Zinc deficiency       Current Outpatient Prescriptions:      ascorbic acid, vitamin C, (VITAMIN C) 100 MG tablet, Take 500 mg by mouth daily., Disp: , Rfl:      calcium-vitamin D 500 mg(1,250mg) -200 unit per tablet, Take 1 tablet by mouth daily., Disp: , Rfl:      cholecalciferol, vitamin D3, 1,000 unit tablet, Take 1,000 Units by mouth daily., Disp: , Rfl:      levothyroxine (SYNTHROID, LEVOTHROID) 125 MCG tablet, Take 125 mcg by mouth daily., Disp: , Rfl:      lysine 500 mg Tab, , Disp: , Rfl:      phentermine (ADIPEX-P) 37.5 mg tablet, Take 1/2 to 1 tablet " "in the morning. (Patient taking differently: 2 (two) times a day. Take 1/2 tablet in the morning. Take 1/2 tablet in the afternoon.), Disp: 90 tablet, Rfl: 1     spironolactone (ALDACTONE) 100 MG tablet, Take 200 mg by mouth daily., Disp: , Rfl:       Interim: Since our last visit, she has had excellent weight loss.  Her abnormal liver tests are being followed up by by GI and her PCP.  Her recent testing seems to indicate a high likelihood of hemochromatosis as a cause of her liver damage, she has some additional follow up testing planned with her PCP and we discussed today perhaps calling them to check on starting phlebotomy even prior to her GI appointment.  She did recall once being told to avoid \"minerals\" 30 years ago because \"my body doesn't get rid of them normally\". She recalls a grandfather possibly needing phlebotomy as well. Advised that if she is found to have hemochromatosis to have her siblings/children get checked out for it as well.    Plan:   1.  Diet: continue her excellent 1500kcal diet,   2. Exercise: continue walking  3. Medication: continue if tolerated. Weight loss should decrease any contribution of her weight/obesity on her liver disease.  4.  Stress Reduction:  Very nervous and concerned about her liver issues  5. Goals: Has exceeded 10% weight loss this year and will look to maintain that as she gets her liver evaluated further.  6. Recommended she follow up her hemachromatosis workup with her PCP as she recalls them asking her to get some additional blood drawn to follow up her testing. Genetic testing may be in order per their discretion and initiation of phlebotomy given her ferritin of 1300.  She states her GI appt is at the end of Sept.      We discussed [facility] Bariatric Basics including:  -eating 3 meals daily  -eating protein first  -eating slowly, chewing food well  -avoiding/limiting calorie containing beverages  -We discussed the importance of restorative sleep and stress " management in maintaining a healthy weight.  -We discussed the National Weight Control Registry healthy weight maintenance strategies and ways to optimize metabolism.  -We discussed the importance of physical activity including cardiovascular and strength training in maintaining a healthier weight and explored viable options.    Most recent labs:  Lab Results   Component Value Date    WBC 5.0 08/09/2017    HGB 13.8 08/09/2017    HCT 38.4 08/09/2017     (H) 08/09/2017    PLT 69 (L) 08/09/2017     Lab Results   Component Value Date    CHOL 171 07/24/2015     Lab Results   Component Value Date    HDL 47 07/24/2015     Lab Results   Component Value Date    LDLCALC 98 07/24/2015     Lab Results   Component Value Date    TRIG 132 07/24/2015     No components found for: CHOLHDL  Lab Results   Component Value Date    ALT 57 (H) 08/09/2017    AST 80 (H) 08/09/2017    ALKPHOS 125 (H) 08/09/2017    BILITOT 3.0 (H) 08/09/2017     Lab Results   Component Value Date    HGBA1C 4.2 02/15/2017     Lab Results   Component Value Date    NBYSVOML83 >2000 (H) 08/09/2017     Lab Results   Component Value Date    IOYJYXOO74ON 28.2 (L) 08/09/2017     Lab Results   Component Value Date    FERRITIN 1365 (H) 08/09/2017     Lab Results   Component Value Date    PTH 26 02/15/2017     No results found for: 83020  No results found for: 7597  Lab Results   Component Value Date    TSH 4.08 09/27/2016     No results found for: TESTOSTERONE    DIETARY HISTORY    Positive Changes Since Last Visit: maintaining  Struggling With: stress over her liver    Knowledgeable in Reading Food Labels: yes  Getting Adequate Protein: yes  Sleeping 7-8 hours/day yes  Stress management is reasonable    PHYSICAL ACTIVITY PATTERNS:  Cardiovascular: walking  Strength Training: limited    REVIEW OF SYSTEMS  GENERAL/CONSTITUTIONAL:  Doing well  HEENT:   no complaints  CARDIOVASCULAR:   no chest pain/palps, tolerates phentermine  PULMONARY:   no  "CAMARA  GASTROINTESTINAL:  occ nausea, no pain  UROLOGIC:  na  NEUROLOGIC:  No headaches  PSYCHIATRIC:  stressed  MUSCULOSKELETAL/RHEUMATOLOGIC   no changes  ENDOCRINE:  Taking her synthroid  DERMATOLOGIC:  No rashes or skin discolorations that she's noticed.    PHYSICAL EXAM:  Vitals: There were no vitals taken for this visit.  Height: 5' 2\" (1.575 m) (8/4/2017  8:00 AM)  Initial Weight: 250 lbs (8/4/2017  8:00 AM)  Weight: 213 lb 8 oz (96.8 kg) (8/4/2017  8:00 AM)  Weight loss from initial: 36.5 (8/4/2017  8:00 AM)  % Weight loss: 14.6 % (8/4/2017  8:00 AM)  BMI (Calculated): 39 (8/4/2017  8:00 AM)  SpO2: 100 % (6/7/2017  9:15 AM)    GEN: Pleasant, well groomed, in no acute distress  HEENT: PEERLA, EOMI, airway clear. .  NECK:  no anterior/supraclavicular lymphadenopathy, thyroid normal   HEART: Rhythm regular, rate regular, no murmur   LUNGS: Clear without crackles or wheezes. No cough.  ABDOMEN: obese.  EXTREMITIES: 2 plus palpable peripheral pulses, radial, no tremor. No hyperpigmentation evident..  NEURO: Alert and Oriented X3, normal gait and speech.  SKIN: No visible rashes.        25 minutes was spent in direct consultation, with over 50% of it spent in counseling regarding their plan for excess weight reduction and health modification and in particular time spent (per her request) reviewing her recent labs and importance of following up with her PCP/GI and possibly hematology.  Darron Andujar MD  [facility]  8:34 AM    "

## 2021-06-12 NOTE — PROGRESS NOTES
Dr. Steel would like the patient to have her Vitamin D, PTH, CMP and zinc rechecked.  Note will be sent to the patient through [employer] as a reminder.  Ranjana Celis RN

## 2021-06-12 NOTE — PROGRESS NOTES
Non-surgical Weight Loss Follow Up Diet Evaluation    Assessment:  This patient is a 57 y.o. female is being seen today for follow-up non-surgical nutritional evaluation. Today we reviewed the patients current eating habits and level of physical activity, and instructed on the changes that are required for successful weight loss outcomes.    +Pt late for appt- quick updates; limited time for education  Phentermine: 1/2 tabe 2X/day  Vitamins/Mineral Supplementation: Vitamin D, C and CaCitrate; on zinc - switching to multi-vitamin    Pt's Initial Weight: 250 lbs  Weight: 213 lb 8 oz (96.8 kg)  Weight loss from initial: 36.5  % Weight loss: 14.6 %  BMI: Body mass index is 39.05 kg/(m^2).  IBW: 109 lbs  +Down almost 37lbs!!  Personal goal weight: under 200lbs    Estimated RMR ([place]-St Jeor equation): 1564 calories  Protein requirements (.5grams to .9grams per pound IBW, 20-30% of calories, minimum of 60-80gm per day): 55-98  grams    Progress made since last visit: Pt had liver enzymes elevated on last labs; still needs to follow-up. Pt is loving changes she has made and the weight she has lost.    -nausea in AM; if she can not eat she will drink premier protein drink   Diet Recall/Time:   Breakfast: 1 egg w/ ham and cheese (15g) sometimes with oatmeal   Am Snack: Greek yogurt w/ fruit (10g)   Lunch: Salad w/meat and cheese OR sandwich w/ meat and limited hull and fruit (15g)   Pm snack: Premier protein (30g) OR string cheese if hungry  Dinner: Pro/Veg/CHO (28g)   HS Snack: 1 cookie occasionallly  Protein: 98g    Typical Snacks: above  Meals per week away from home: varied; conscious of this 0-3     Recommended limiting eating out to no more than 2x/week.  Patient and I reviewed the importance of eating three consistent meals per day; as well as meal timing to be spaced 4-5 hours apart.  Snack choices: 100-150 calories (1-2x/day if physically hungry), incorporating a fruit/vegetable w/ protein source.    Meal  Duration: 30 minutes    Portion Sizes problematic? NO per patient/diet recall  Encouraged slowing meal times down, 20-30 minutes, chewing to applesauce consistency.   To aid in proper portion control and slow meal time down discussed consuming meals off smaller plates, use toddler/children utensils and set utensils down after each bite.    Protein, vegetables/fruits, carbohydrates:   The patient and I discussed the importance of including lean/low fat protein at each meal and limiting carbohydrate intake to less than 25% of plate volume.     Beverages (Type/Oz. per day)  Water: 64oz  Regular soda: none  Diet soda: on occasion   Juice: none  Miky-Aid/lemonade/etc: none  Alcohol: has eliminated with liver recs    Discussed the importance of adequate hydration and the goal of 64+ oz of fluid daily.   The patient understands the importance of  avoiding all sweetened and alcoholic drinks, and instead choosing 64 oz plain water.    Exercise  Gardening and cleaning currently   Knees are in pain - starting to feel a little better  Walking 1-2X/week     Pt's understands that 45-60 minutes of daily activity is an important part of weight loss success.   Encouraged pt to incorporate  strength training exercise in addition to cardiovascular exercise most days of the week.    PES statement:     1. (NC-3.3.5) Obese, BMI ?35 related to physical inactivity as evidenced by Infrequent, low-duration and or low intensity physical activity; and Large amounts of sedentary activities; no structured physical activity regimen    Intervention:  Discussion:  1. Reviewed pt on Eat Better, Move More, Live Well: Non-surgical Weight Loss Handout  2. Reviewed lean protein sources.  15-20gm protein at 3 meals daily. 55-98 grams daily total.    Instructions/Goals:   1. Include 15-20gm protein at each meal.  2. Increase vegetable/fruit intake, by having a vegetable or fruit with each meal daily. Recommended pt to increase vegetable/fruit intake to  4-5 servings daily.  3. Increase fluid intake to 64oz daily: choose plain or calorie/alcohol-free beverages.  4. Incorporate daily structured activity, 45-60 minutes most days of the week  5. Read food labels more consistently: keeping total fat grams <10, total sugar grams <10, fiber >3gm per serving.   6. Practice plate method: 1/2 plate lean/low fat protein source, vegetable/fruit, <25% of plate complex carbohydrates.  7. Carbohydrates from grain sources at meal times to be no more than 1 Carb Choice, ie: 15-20 gm total carbohydrate per serving  8. Practice eating off of smaller plates/bowls, chewing to applesauce consistency, taking 20-30 minutes to eat in a calm/relaxed environment without distractions of tv/email/cell phone.    Monitor/Evaluation:    Pt will f/u in one month with bariatrician, and f/u in two months with RD.    Plan for next visit with RD:  GOALS:  1) continue with protein goals w/ meals and throughout the day  2) Continue to walk 1-2X/week   3) follow-up on 9th with liver  and Dr GAONA on the 23rd     Time In: 8:15a  Time Out: 8:30a    ABN signed: Yes

## 2021-06-13 NOTE — PROGRESS NOTES
Nicci Pemberton arrived for labs and Therapeutic Phlebotomy as indicated. Nicci reports her platelets are low and that she gets spontaneous bruises and nose bleeds at times. She will be having a consult with Dr. Leija at [facility] next week for her Iron overload and Hemochromatosis. Lab specimen obtained with ease.Therapeutic phlebotomy indicated for Hgb > or = 12. Results were  reviewed as required Hgb 12.9. Nicci Pemberton was educated on her plan of care. Therapeutic Phlebotomy treatment was completed for 500 ml as ordered with no signs of reaction. IV site:peripheral IV patent throughout treatment with positive blood return obtained before and at completion. IV site with no pain, redness, swelling and drainage. IV site discontinued. Nicci Pemberton has follow-up appointment scheduled on 07/26/17. Nicci Pemberton was discharged to home. She had no dizziness while up and about and was discharged from unit ambulatory per self at 1650.The after visit summary was given.     Greta Moss

## 2021-06-13 NOTE — PROGRESS NOTES
Nicci Pemberton came in today for lab draw and possible Therapeutic Phlebotomy. IV access placed left AC on second attempt. Lab specimen obtained with ease. Hgb 11.1 today. Therapeutic Phlebotomy held as hgb was < 12. IV access discontinued and Nicci was discharged to home. She will be seeing a physician at [facility] this Thursday. Nicci has a follow up appointment on 10/03/17 for labs and possible phlebotomy. But this may change depending on her [facility] appointment.

## 2021-06-13 NOTE — PROGRESS NOTES
Late entry. Per Mee Mckeon RN, a call was received from Nicci yesterday. Nicci reported that her MD Specialist wants her to hold phlebotomy procedure until further notice. And she will not be in for the 10/03/17 appointment..

## 2021-06-14 NOTE — PROGRESS NOTES
HPI: Nicci Pemberton is a 57 y.o. female referred to see me by Pedro Ricardo MD for cholelithiasis.  Her medical history is notable for the recent diagnosis of cirrhosis of unknown etiology, as well as abnormally elevated ferritin, B12, and LFTs.  Her elevated LFTs were identified by Dr. Andujar, during the course of nonsurgical weight loss evaluation and treatment.      Unfortunately we do not have all of her medical records at this point in time.  Form what she reports to me, she was diagnosed with cirrhosis several months ago, and is currently being seen by Dr. Pedro Leija from [place] [facility].  As part of her evaluation she has undergone laboratory evaluation to identify the source of her cirrhosis, which identified the above abnormalities in her iron content.  She is also been seen by oncology and hematology to evaluate for any hematologic malignancy, which she reports did not identify any abnormalities.  As part of her workup for cirrhosis, she did undergo ultrasound and CT imaging of the abdomen which revealed the cholelithiasis.    She reports that her only symptoms related to her abdomen are mild nausea, but is most common when she first wakes up in the morning, lasting between several minutes and an hour, before feeding throughout the course the rest of the day.  She does not note any association of nausea with eating or drinking.  She does not report any abdominal pain.  Denies any fevers or chills.      Allergies:Sulfa (sulfonamide antibiotics) and Lasix [furosemide]    Past Medical History:   Diagnosis Date     Abnormal LFTs      Bruising      Cellulitis     L leg     Edema of both legs      Fatty liver      Fatty metamorphosis      Hypothyroid      Measles      Mumps      Urinary incontinence      Zinc deficiency        No past surgical history on file.    CURRENT MEDS:    Current Outpatient Prescriptions:      cholecalciferol, vitamin D3, 1,000 unit tablet, Take 1,000 Units by mouth  "daily., Disp: , Rfl:      levothyroxine (SYNTHROID, LEVOTHROID) 125 MCG tablet, Take 125 mcg by mouth daily., Disp: , Rfl:      magnesium oxide (MAG-OX) 400 mg tablet, Take 400 mg by mouth daily., Disp: , Rfl:      phentermine (ADIPEX-P) 37.5 mg tablet, TAKE 1/2 TO 1 TABLET BY MOUTH IN THE MORNING, Disp: 90 tablet, Rfl: 1     spironolactone (ALDACTONE) 100 MG tablet, Take 200 mg by mouth daily., Disp: , Rfl:     Family History   Problem Relation Age of Onset     No Medical Problems Mother      Restless legs syndrome Father      Mental illness Sister      Alcohol abuse Brother      No Medical Problems Son      Mental illness Maternal Grandfather      Heart failure Paternal Grandmother      No Medical Problems Son      No Medical Problems Maternal Grandmother      Breast cancer Paternal Aunt         reports that she quit smoking about 6 years ago. Her smoking use included Cigarettes. She has a 35.00 pack-year smoking history. She has never used smokeless tobacco. She reports that she drinks about 0.6 oz of alcohol per week  She reports that she does not use illicit drugs.    Review of Systems:  The 10 point review of systems  is within normal limits except for as mentioned above in the HPI.  General ROS: No complaints or constitutional symptoms  Skin: No complaints or symptoms   Hematologic/Lymphatic: Thrombocytopenia, with easy bruising  Psychiatric: No symptoms or complaints  Endocrine: No excessive fatigue, no hypermetabolic symptoms reported  Respiratory ROS: no cough, shortness of breath, or wheezing  Cardiovascular ROS: no chest pain or dyspnea on exertion  Gastrointestinal ROS: As per HPI  Musculoskeletal ROS: no recent injuries reported  Neurological ROS: no focal neurologic defects reported.        /58 (Patient Site: Right Arm, Patient Position: Sitting, Cuff Size: Adult Regular)  Pulse 93  Ht 5' 2\" (1.575 m)  Wt 220 lb (99.8 kg)  SpO2 99%  BMI 40.24 kg/m2  Body mass index is 40.24 " kg/(m^2).    EXAM:  General : Alert, cooperative, appears stated age   Skin: Skin color slightly yellowed, texture, turgor normal, no rashes or lesions.  Multiple bruises over the arm and left jaw, stemming from having a tooth pulled recently  Lymphatic: No obvious adenopathy, no swelling   Eyes: Mild scleral icterus, pupils equal  HENT: no traumatic injury to the head or face, no gross abnormalities  Lungs: Normal respiratory effort, breath sounds equal bilaterally  Heart: Regular rate and rhythm  Abdomen: Soft, nondistended.  Some mild tenderness exists palpation of the epigastric region, with a palpable firmness in the area, unclear if this is her gallbladder or the liver edge.  Musculoskeletal: No obvious swelling,  Neurologic: Grossly intact      LABS:  Lab Results   Component Value Date    WBC 5.0 08/09/2017    HGB 11.1 (L) 09/26/2017    HCT 38.4 08/09/2017     (H) 08/09/2017    PLT 69 (L) 08/09/2017     INR/Prothrombin Time      Lab Results   Component Value Date    ALT 57 (H) 08/09/2017    AST 80 (H) 08/09/2017    ALKPHOS 125 (H) 08/09/2017    BILITOT 3.0 (H) 08/09/2017       IMAGES:   Relevant images were reviewed and discussed with the patient.  Notable findings were from her ultrasound obtained December 6, 2017; sludge and stones are noted within a distended gallbladder, with a borderline gallbladder wall thickness and some trace pericholecystic fluid.    CT imaging obtained December 1, 2017 demonstrates cholelithiasis with nonspecific pericholecystic fluid.  There is evidence of portal hypertension, with prominent paraesophageal varices as well as splenomegaly.    Assessment/Plan:   1. Chronic cholecystitis    2. Obesity, Class II, BMI 35-39.9, with comorbidity    3. Cirrhosis of liver without ascites, unspecified hepatic cirrhosis type        Nicci Pemberton is a 57 y.o. female with liver cirrhosis of unknown etiology, and cholelithiasis, with an unclear picture of whether or not this is causing  her nausea and abdominal discomfort.  We do not have enough labs recently to calculate her meld score, but a rough estimate based off the labs we do have from October would suggest a meld of at least 13.      It is unclear to me if her morning nausea and abdominal discomfort are secondary to her cholelithiasis, underlying liver disease and portal hypertension, or some combination of the 2.  The lack of distinct abdominal pain would argue against a chronic or acute cholecystitis, while the findings of thickened gallbladder wall and pericholecystic fluid may simply be secondary to her underlying liver disease, as opposed to primary gallbladder disease.    I suspect there is likely a contribution of her gallbladder to her nausea and abdominal discomfort.  I do think that removal will likely lead her to having less nausea.  Unfortunately with her liver disease, she is at higher risk for perioperative complications, particularly bleeding given her thrombocytopenia and portal hypertension.    The risks of surgery were discussed in detail which include, but are not limited to, bleeding, infection, injury to surrounding structures, the need to convert to an open procedure, blood clots, stroke, heart attack and death.  Specifically we discussed the risk of damage to the common bile duct and hepatic vessels, bleeding secondary to portal hypertension and underlying cirrhotic liver disease, and the complications that may arise from damage to these structures.  Additionally, the risks of non operative management were discussed which include, but are not limited to, worsening infection, increased pain, sepsis and death.     I think prior to proceeding with any surgical removal of her gallbladder, we should have her follow-up with her gastroenterologist, Dr. Leija to ensure he sees no room for further optimization of her liver disease.  We will also need to have her seen by Dr. Ricardo for preoperative evaluation.  We will need  to obtain more recent LFTs, CBC, INR and blood chemistry prior to proceeding with surgery, but I think we can obtain these once she is followed up with her gastroenterologist.    Tentatively schedule surgery for the end of January, with low threshold to change the date, or even pursuit of surgery if her medical circumstances change.  She is in agreement with this plan.    Honorio Rios MD  [phone]  [facility] Department of Surgery

## 2021-06-19 NOTE — LETTER
Letter by Carlo Boo DO at      Author: Carlo Boo DO Service: -- Author Type: --    Filed:  Encounter Date: 8/9/2019 Status: (Other)         Patient: Nicci Pemberton   MR Number: [id number]   YOB: 1960   Date of Visit: 8/9/2019     [facility]      Facility:    [facility] [[id number]]  Code Status: FULL CODE      Chief Complaint/Reason for Visit:  Chief Complaint   Patient presents with   ? H & P     Spontaneous bacterial peritonitis currently on antibiotics, cirrhosis with hepatic encephalopathy, enterocolitis, hyponatremia, anemia, thrombocytopenia, generalized edema, physical debility.       HPI:   Nicci is a 59 y.o. female who does have a history of Murphy cirrhosis and is currently on the waiting list for a liver transplant.  She also has iron overload secondary to alpha-1 antitrypsin heterozygous.  Her other comorbidities include hypothyroid hypertension and she was admitted to the hospital on 7/31/2019 of watery diarrhea and bright red blood in her rectum.  She did have dry heaves at this time and lower abdominal pain.  She was worked up very thoroughly was found to have enterocolitis and cirrhotic changes in the liver with moderate ascites.  There was a tap done on that day with a paracentesis diagnostic and it showed 33,520 white counts with 88% polymorphonucleocytes.  This is consistent with spontaneous bacterial peritonitis and fluid culture later showed no growth but she was put on antibiotics.  She was started on Zosyn and Flagyl and she was transferred to oral ciprofloxacin.  She also had normal cytopenia and anemia with as well as hyponatremia from her cirrhosis and hepatic profile did worsen.  She developed hepatic encephalopathy in and out of confusion at this time and elevated INR.  Meld score was monitored daily which trended up to 32 on 8 1 and peaked at 33 on 8 2.  Rifaximin was added on 8 5.  Her lactulose dose was increased  and I do not have an actual ammonia level at this time assume it was high with her hepatic encephalopathy and she was treated probably and transferred here to the TCU at [facility] in stable condition.    Patient is having pain in her knees but she says it is tolerable.  Is been doing this for long time and does not want to take any oxycodone secondary to the preponderance of possible addiction.  She is doing well at this time and asked me if she thought her spontaneous bacterial peritonitis is gone I said it is in the process at this time.  She is moving her bowels several times a day so the lactulose is working.  We cannot get ammonia level at this time but she seems alert and oriented but somewhat foggy and I am unclear of her baseline functioning.  Her blood pressure is low which may contribute to some of her dizziness at this time but she is actually doing okay.    Past Medical History:  Past Medical History:   Diagnosis Date   ? Abnormal LFTs    ? Bruising    ? Cellulitis     L leg   ? Edema of both legs    ? Fatty liver    ? Fatty metamorphosis    ? Hypothyroid    ? Measles    ? Mumps    ? Urinary incontinence    ? Zinc deficiency            Surgical History:  Past Surgical History:   Procedure Laterality Date   ? US PARACENTESIS  7/31/2019   ? US PARACENTESIS WITH ALBUMIN  8/5/2019       Family History:   Family History   Problem Relation Age of Onset   ? No Medical Problems Mother    ? Restless legs syndrome Father    ? Mental illness Sister    ? Alcohol abuse Brother    ? No Medical Problems Son    ? Mental illness Maternal Grandfather    ? Heart failure Paternal Grandmother    ? No Medical Problems Son    ? No Medical Problems Maternal Grandmother    ? Breast cancer Paternal Aunt        Social History:    Social History     Socioeconomic History   ? Marital status:      Spouse name: None   ? Number of children: None   ? Years of education: None   ? Highest education level: None    Occupational History   ? None   Social Needs   ? Financial resource strain: None   ? Food insecurity:     Worry: None     Inability: None   ? Transportation needs:     Medical: None     Non-medical: None   Tobacco Use   ? Smoking status: Former Smoker     Packs/day: 1.00     Years: 35.00     Pack years: 35.00     Types: Cigarettes     Last attempt to quit: 10/20/2011     Years since quittin.8   ? Smokeless tobacco: Never Used   Substance and Sexual Activity   ? Alcohol use: Yes     Alcohol/week: 0.6 oz     Types: 1 Cans of beer per week     Comment: social   ? Drug use: No   ? Sexual activity: Yes     Partners: Male     Birth control/protection: Post-menopausal   Lifestyle   ? Physical activity:     Days per week: None     Minutes per session: None   ? Stress: None   Relationships   ? Social connections:     Talks on phone: None     Gets together: None     Attends Protestant service: None     Active member of club or organization: None     Attends meetings of clubs or organizations: None     Relationship status: None   ? Intimate partner violence:     Fear of current or ex partner: None     Emotionally abused: None     Physically abused: None     Forced sexual activity: None   Other Topics Concern   ? None   Social History Narrative    .  2 boys.  .  HR.           Review of Systems   Constitutional:        Review of systems positive for pain in bilateral knees.  She does have diarrhea secondary to lactulose but denies any fevers chills nausea vomiting.  There is no change in vision hearing taste or smell weakness one side of the chest pain or shortness of breath.  And she is urinating without difficulty.  The remainder the review of systems is negative.       Vitals:    19 0937   BP: 96/58   Pulse: 79   Resp: 18   Temp: 97.8  F (36.6  C)   SpO2: 92%       Physical Exam   Constitutional: No distress.   HENT:   Head: Normocephalic and atraumatic.   Nose: Nose normal.   Eyes: Conjunctivae  are normal. Right eye exhibits no discharge. Left eye exhibits no discharge.   Neck: Neck supple. No thyromegaly present.   Cardiovascular: Normal rate and regular rhythm.   Pulmonary/Chest: Effort normal and breath sounds normal. No respiratory distress. She has no wheezes. She has no rales.   Abdominal:   Abdomen is distended but nontender.  It is not dull to percussion however with body habitus is this is difficult to detect.  There is no rebound or guarding.   Musculoskeletal: She exhibits edema. She exhibits no tenderness.   Lymphadenopathy:     She has no cervical adenopathy.   Neurological: She is alert. No cranial nerve deficit. She exhibits normal muscle tone.   Skin: Skin is warm and dry. She is not diaphoretic.   Psychiatric: She has a normal mood and affect. Her behavior is normal.       Medication List:  Current Outpatient Medications   Medication Sig   ? bumetanide (BUMEX) 1 MG tablet Take 1 mg by mouth daily.   ? cholecalciferol, vitamin D3, 1,000 unit tablet Take 1,000 Units by mouth 2 (two) times a day.          ? ciprofloxacin HCl (CIPRO) 500 MG tablet Take 1 tablet (500 mg total) by mouth Daily at 6:00 am for 14 days.   ? famotidine (PEPCID) 20 MG tablet Take 20 mg by mouth 2 (two) times a day.          ? lactulose (ENULOSE) 20 gram/30 mL Soln solution Take 30 mL (20 g total) by mouth 3 (three) times a day.   ? levothyroxine (SYNTHROID, LEVOTHROID) 125 MCG tablet Take 125 mcg by mouth daily.   ? magnesium oxide (MAG-OX) 400 mg tablet Take 400 mg by mouth daily.   ? oxyCODONE (ROXICODONE) 5 MG immediate release tablet Take 1 tablet (5 mg total) by mouth every 8 (eight) hours as needed.   ? rifAXIMin (XIFAXAN) 550 mg Tab tablet Take 1 tablet (550 mg total) by mouth 2 (two) times a day.   ? spironolactone (ALDACTONE) 100 MG tablet Take 100 mg by mouth 2 (two) times a day at 9am and 6pm.              Labs: As follows; sodium was 131, potassium 3.8, chloride was 9.8, CO2 was 29, anion gap was 4,  glucose 75, BUN 15, creatinine 0.76, GFR is greater than 60, total bilirubin was 15.6, calcium was 8.0, albumin was 1.5, alk phosphatase 126, AST was 91, ALT was 42, INR was 2.9.      Assessment:    ICD-10-CM    1. Spontaneous bacterial peritonitis (H) K65.2    2. Cirrhosis of liver with ascites, unspecified hepatic cirrhosis type (H) K74.60     R18.8    3. Hepatic encephalopathy (H) K72.90    4. Hyponatremia E87.1    5. Thrombocytopenia (H) D69.6    6. Anemia, unspecified type D64.9    7. Pain management R52        Plan: Plan at this time will continue physical and occupational therapy for strength and conditioning.  For hepatic encephalopathy we will watch her mental status very closely and adjust the lactulose as needed.  We will check a CBC basic metabolic profile and complete LFTs today secondary to hyperbilirubinemia and elevated liver function tests.  Abdominal girth to be done daily weights will be done daily and will fax these results to her GI specialist.    I will likely stop the oxycodone after this week and will keep her nausea as needed in case she really needs it but she is in fear of being addicted.  She we are using nonpharmacologic modalities on her knees at this time with ice and we will continue to monitor that pain at this time.    CBC will be done today.      Electronically signed by: Carlo Boo DO

## 2021-06-19 NOTE — LETTER
Letter by Nicci Pugh CNP at      Author: Nicci Pugh CNP Service: -- Author Type: --    Filed:  Encounter Date: 8/14/2019 Status: (Other)         Patient: Nicci Pemberton   MR Number: [id number]   YOB: 1960   Date of Visit: 8/14/2019     Code Status:  FULL CODE  Visit Type: Problem Visit     Facility:  [facility] [[id number]]             History of Present Illness: Nicci Pemberton is a 59 y.o. female who I am seeing today for follow up on the TCU. Pt recently hospitalized on 7/31/19.  Along with iron overload secondary patient with a history of an ELIZA cirrhosis to alpha-1 antitrypsin heterozygous on the liver transplant list at the [facility], hypothyroidism, hypertension, and hernia.  Patient complained of a 2-day history of frequent watery diarrhea with bright red blood from her rectum with increased lower abdominal pain.  ER work-up and CT of her abdomen and pelvis showed enterocolitis, cirrhotic changes of the liver with moderate ascites and splenomegaly measuring 15 cm.  She underwent paracentesis on 7/31/2019 with a removal of 0.4 L of fluid.  Ascitic fluid showed WBCs with 88% PMNs consistent with SBP.  Fluid culture however later showed no growth but Gram stain showed 3+ p.m. with no organisms seen.  She was initially treated with Zosyn and Flagyl.  She was seen by GI/hepatology.  In the MELD was 31.  Previously 24 on 7/11/2019.  Patient also found to have anemia and thrombocytopenia, hyponatremia from her cirrhosis and low BP.  She did develop hepatic encephalopathy with intermittent confusion.  She was started on rifaximin on 8/5.  Her lactulose dose was increased and home dose diuretics were resumed.  She underwent ultrasound-guided paracentesis again on 8/5 with 50 cc of clear fluid removed.  Culture showed no growth.  However it did have 80% PMNs.  She was started on Cipro orally for STD prophylaxis.      Today patient lying in bed. She tells me she  "received a phone call from her GI MD telling her she has been moved up on the transplant list. She is somewhat tearful on exam today regarding circumstances and toll of chronic illness. She also reports stooling ~17 times in 24 hours. She feels very exhausted. Her appetite is poor secondary to \"everythinggoing in is coming out.\"   She continues on lactulose twice daily.  We will hold. She is very jaundiced.  Her hepatic enzymes continue to rise. She denies any itching.  No tremors. She continues with lower extremity edema.  She is receiving lymphedema wraps.  She continues on Bumex.  On Cipro for total of 14 days.  She was previously taking oxycodone however this was discontinued per Dr. Crespo.  She is eating fairly well.  She is emptying her bladder. Blood pressures low. She is asymptomatic. Abdominal girth continues to be the same as admit.  Her weight is down 3 pounds.  She has hyper bilirubinemia.  Previously  Ted at 18 now at 21.1.  Her sodium is 131 which has been stable.  She denies any shortness of breath or chest pain.      Active Ambulatory Problems     Diagnosis Date Noted   ? Edema 11/23/2016   ? Hypothyroidism 11/23/2016   ? Venous hypertension, chronic, with ulcer and inflammation, left (H) 11/23/2016   ? Venous hypertension, chronic, with inflammation, right 11/23/2016   ? Acquired lymphedema of leg 11/23/2016   ? Peripheral neuropathy 11/23/2016   ? Vitamin D deficiency 11/23/2016   ? Zinc deficiency    ? Obesity with serious comorbidity 08/23/2017   ? Cirrhosis of liver (H) 06/18/2018   ? Leg swelling 07/29/2019   ? Venous hypertension of lower extremity, bilateral 07/29/2019   ? Osteoarthritis of both knees, unspecified osteoarthritis type 07/29/2019   ? Chronic pain of both knees 07/29/2019   ? Cramp in lower extremity associated with sleep 07/29/2019   ? Enterocolitis 07/31/2019     Resolved Ambulatory Problems     Diagnosis Date Noted   ? Cellulitis of left lower extremity 09/27/2016   ? " Morbid obesity with BMI of 40.0-44.9, adult (H) 11/23/2016     Past Medical History:   Diagnosis Date   ? Abnormal LFTs    ? Bruising    ? Cellulitis    ? Edema of both legs    ? Fatty liver    ? Fatty metamorphosis    ? Hypothyroid    ? Measles    ? Mumps    ? Urinary incontinence    ? Zinc deficiency        Current Outpatient Medications   Medication Sig   ? bumetanide (BUMEX) 1 MG tablet Take 1 mg by mouth daily.   ? cholecalciferol, vitamin D3, 1,000 unit tablet Take 1,000 Units by mouth 2 (two) times a day.          ? ciprofloxacin HCl (CIPRO) 500 MG tablet Take 1 tablet (500 mg total) by mouth Daily at 6:00 am for 14 days.   ? famotidine (PEPCID) 20 MG tablet Take 20 mg by mouth 2 (two) times a day.          ? lactulose (ENULOSE) 20 gram/30 mL Soln solution Take 30 mL (20 g total) by mouth 3 (three) times a day.   ? levothyroxine (SYNTHROID, LEVOTHROID) 125 MCG tablet Take 125 mcg by mouth daily.   ? magnesium oxide (MAG-OX) 400 mg tablet Take 400 mg by mouth daily.   ? rifAXIMin (XIFAXAN) 550 mg Tab tablet Take 1 tablet (550 mg total) by mouth 2 (two) times a day.   ? spironolactone (ALDACTONE) 100 MG tablet Take 100 mg by mouth 2 (two) times a day at 9am and 6pm.              Allergies   Allergen Reactions   ? Sulfa (Sulfonamide Antibiotics) Other (See Comments)     Joint swelling   ? Lasix [Furosemide] Rash         Review of Systems  No fevers or chills. No headache, lightheadedness or dizziness. No SOB, chest pains or palpitations.  Appears alert and oriented x3.  Appetite is fair.. No nausea, vomiting, constipation she reports about 9 loose stools per day. No dysuria, frequency, burning or pain with urination. Tearful today. Otherwise review of systems are negative.     Physical Exam  PHYSICAL EXAMINATION:  Vital signs: BP 74/48, pulse 80, respirations 18, temperature 98.1, O2 sat 93% on room air.  Current weight 232.6 pounds.  Seeing today for follow-up on the TCU.  Patient recently  hospitalized  General: Awake, Alert, oriented x3, appropriately, follows simple commands, conversant  HEENT:PERRLA, jaundice conjunctiva,sclerae icterus, moist oral mucosa  NECK: Supple, without any lymphadenopathy, or masses  CVS:  S1  S2, without murmur or gallop.   LUNG: Clear to auscultation, No wheezes, rales or rhonci.  BACK: No kyphosis of the thoracic spine  ABDOMEN: Soft, obese, soft, nontender to palpation, with positive bowel sounds  EXTREMITIES: Good range of motion on both upper and lower extremities, 4+ pedal edema, compressionin place,, no calf tenderness  SKIN: Warm and dry, no rashes or erythema noted  NEUROLOGIC: Intact, pulses palpable  PSYCHIATRIC: Cognition intact. Tearful on exam.       Labs:      Recent Results (from the past 240 hour(s))   Basic Metabolic Panel   Result Value Ref Range    Sodium 128 (L) 136 - 145 mmol/L    Potassium 4.2 3.5 - 5.0 mmol/L    Chloride 98 98 - 107 mmol/L    CO2 30 22 - 31 mmol/L    Anion Gap, Calculation 0 (L) 5 - 18 mmol/L    Glucose 78 70 - 125 mg/dL    Calcium 8.7 8.5 - 10.5 mg/dL    BUN 24 (H) 8 - 22 mg/dL    Creatinine 0.68 0.60 - 1.10 mg/dL    GFR MDRD Af Amer >60 >60 mL/min/1.73m2    GFR MDRD Non Af Amer >60 >60 mL/min/1.73m2   Ammonia   Result Value Ref Range    Ammonia 46 (H) 11 - 35 umol/L   Hepatic Profile   Result Value Ref Range    Bilirubin, Total 15.2 (H) 0.0 - 1.0 mg/dL    Bilirubin, Direct 7.5 (H) <=0.5 mg/dL    Protein, Total 4.5 (L) 6.0 - 8.0 g/dL    Albumin 1.6 (L) 3.5 - 5.0 g/dL    Alkaline Phosphatase 105 45 - 120 U/L    AST 75 (H) 0 - 40 U/L    ALT 34 0 - 45 U/L   Protime-INR   Result Value Ref Range    INR 3.02 (H) 0.90 - 1.10   Enteric Pathogen panel (per [facility] request) - Misc. Lab Test   Result Value Ref Range    Miscellanous Test Dept. Chemistry Reference Test     Test Name Enteric Pathogen Panel      Performing Lab       [facility]  [facility] [id number] ()  [address]  [place] [address]      Scan Result See Scanned Report    Cell Ct/Diff, Body Fluid   Result Value Ref Range    Color, Fluid Yellow     Appearance, Fluid Hazy     WBC, Fluid 1,811 (H) 0 - 99 /uL    RBC, Fluid <50,000 <50,000 /ul    Neutrophil % 80 (H) <=25 %    Lymphocyte % 5 <=78 %    Monocyte % 15 <=71 %    Macrophage %  <=71 %    Mesothelial %  <=1 %    Eosinophil %  <=1 %    Other Cells %  <=1 %   Albumin, Body Fluid   Result Value Ref Range    Albumin, Fluid <0.6 g/dL   Glucose, Body Fluid   Result Value Ref Range    Glucose, Fluid 80 mg/dL   Culture/Gram Stain: Body Fluid   Result Value Ref Range    Culture No Growth     Gram Stain Result 3+ Polymorphonuclear leukocytes     Gram Stain Result No organisms seen    HM2(CBC w/o Differential)   Result Value Ref Range    WBC 9.8 4.0 - 11.0 thou/uL    RBC 2.71 (L) 3.80 - 5.40 mill/uL    Hemoglobin 10.8 (L) 12.0 - 16.0 g/dL    Hematocrit 30.8 (L) 35.0 - 47.0 %     (H) 80 - 100 fL    MCH 39.9 (H) 27.0 - 34.0 pg    MCHC 35.1 32.0 - 36.0 g/dL    RDW 14.8 (H) 11.0 - 14.5 %    Platelets 48 (LL) 140 - 440 thou/uL    MPV 9.5 8.5 - 12.5 fL   Comprehensive Metabolic Panel   Result Value Ref Range    Sodium 130 (L) 136 - 145 mmol/L    Potassium 3.9 3.5 - 5.0 mmol/L    Chloride 98 98 - 107 mmol/L    CO2 30 22 - 31 mmol/L    Anion Gap, Calculation 2 (L) 5 - 18 mmol/L    Glucose 90 70 - 125 mg/dL    BUN 19 8 - 22 mg/dL    Creatinine 0.69 0.60 - 1.10 mg/dL    GFR MDRD Af Amer >60 >60 mL/min/1.73m2    GFR MDRD Non Af Amer >60 >60 mL/min/1.73m2    Bilirubin, Total 16.4 (H) 0.0 - 1.0 mg/dL    Calcium 8.6 8.5 - 10.5 mg/dL    Protein, Total 4.9 (L) 6.0 - 8.0 g/dL    Albumin 1.7 (L) 3.5 - 5.0 g/dL    Alkaline Phosphatase 118 45 - 120 U/L    AST 90 (H) 0 - 40 U/L    ALT 39 0 - 45 U/L   Protime-INR   Result Value Ref Range    INR 2.93 (H) 0.90 - 1.10   Comprehensive Metabolic Panel   Result Value Ref Range    Sodium 131 (L) 136 - 145 mmol/L    Potassium 3.8 3.5 - 5.0 mmol/L    Chloride 98 98 -  107 mmol/L    CO2 29 22 - 31 mmol/L    Anion Gap, Calculation 4 (L) 5 - 18 mmol/L    Glucose 75 70 - 125 mg/dL    BUN 15 8 - 22 mg/dL    Creatinine 0.76 0.60 - 1.10 mg/dL    GFR MDRD Af Amer >60 >60 mL/min/1.73m2    GFR MDRD Non Af Amer >60 >60 mL/min/1.73m2    Bilirubin, Total 15.6 (H) 0.0 - 1.0 mg/dL    Calcium 8.0 (L) 8.5 - 10.5 mg/dL    Protein, Total 4.8 (L) 6.0 - 8.0 g/dL    Albumin 1.5 (L) 3.5 - 5.0 g/dL    Alkaline Phosphatase 126 (H) 45 - 120 U/L    AST 91 (H) 0 - 40 U/L    ALT 42 0 - 45 U/L   Protime-INR   Result Value Ref Range    INR 2.90 (H) 0.90 - 1.10   Hemoglobin   Result Value Ref Range    Hemoglobin 10.4 (L) 12.0 - 16.0 g/dL   Hepatic Profile   Result Value Ref Range    Bilirubin, Total 18.0 (H) 0.0 - 1.0 mg/dL    Bilirubin, Direct 8.1 (H) <=0.5 mg/dL    Protein, Total 4.9 (L) 6.0 - 8.0 g/dL    Albumin 1.6 (L) 3.5 - 5.0 g/dL    Alkaline Phosphatase 123 (H) 45 - 120 U/L    AST 96 (H) 0 - 40 U/L    ALT 40 0 - 45 U/L   Platelet Count   Result Value Ref Range    Platelets 48 (LL) 140 - 440 thou/uL   Sodium   Result Value Ref Range    Sodium 130 (L) 136 - 145 mmol/L   White Blood Count (WBC)   Result Value Ref Range    WBC 12.0 (H) 4.0 - 11.0 thou/uL   Creatinine   Result Value Ref Range    Creatinine 1.11 (H) 0.60 - 1.10 mg/dL    GFR MDRD Af Amer >60 >60 mL/min/1.73m2    GFR MDRD Non Af Amer 50 (L) >60 mL/min/1.73m2   INR   Result Value Ref Range    INR 2.86 (H) 0.90 - 1.10   Sodium   Result Value Ref Range    Sodium 131 (L) 136 - 145 mmol/L   Albumin   Result Value Ref Range    Albumin 1.6 (L) 3.5 - 5.0 g/dL   Bilirubin, Total   Result Value Ref Range    Bilirubin, Total 21.1 (HH) 0.0 - 1.0 mg/dL   Basic Metabolic Panel   Result Value Ref Range    Sodium 127 (L) 136 - 145 mmol/L    Potassium 4.5 3.5 - 5.0 mmol/L    Chloride 94 (L) 98 - 107 mmol/L    CO2 28 22 - 31 mmol/L    Anion Gap, Calculation 5 5 - 18 mmol/L    Glucose 77 70 - 125 mg/dL    Calcium 8.1 (L) 8.5 - 10.5 mg/dL    BUN 30 (H) 8 - 22  mg/dL    Creatinine 1.88 (H) 0.60 - 1.10 mg/dL    GFR MDRD Af Amer 33 (L) >60 mL/min/1.73m2    GFR MDRD Non Af Amer 27 (L) >60 mL/min/1.73m2   HM2(CBC w/o Differential)   Result Value Ref Range    WBC 9.7 4.0 - 11.0 thou/uL    RBC 2.45 (L) 3.80 - 5.40 mill/uL    Hemoglobin 9.5 (L) 12.0 - 16.0 g/dL    Hematocrit 27.7 (L) 35.0 - 47.0 %     (H) 80 - 100 fL    MCH 38.8 (H) 27.0 - 34.0 pg    MCHC 34.3 32.0 - 36.0 g/dL    RDW 14.8 (H) 11.0 - 14.5 %    Platelets 56 (L) 140 - 440 thou/uL    MPV 10.0 8.5 - 12.5 fL   Hepatic Profile   Result Value Ref Range    Bilirubin, Total 22.9 (HH) 0.0 - 1.0 mg/dL    Bilirubin, Direct 11.8 (H) <=0.5 mg/dL    Protein, Total 4.7 (L) 6.0 - 8.0 g/dL    Albumin 1.5 (L) 3.5 - 5.0 g/dL    Alkaline Phosphatase 142 (H) 45 - 120 U/L    AST 92 (H) 0 - 40 U/L    ALT 40 0 - 45 U/L   Albumin   Result Value Ref Range    Albumin 1.5 (L) 3.5 - 5.0 g/dL         Assessment/Plan:  1. Spontaneous bacterial peritonitis (H)  Continues on Cipro for 14 days.  WBC now 9.7.  A-febrile. No confusion.  Repeat CBC Monday.    2. Cirrhosis of liver with ascites, unspecified hepatic cirrhosis type (H)  Continues on Rifaximin. Followed by GI at the [facility].   Hyperbilirubinemia. Bilirubin up to 22.9.   Juandice.   GI following.   Continues on Lactulose ~17stools per day. Hold afternoon doses. Resume in am.   Last tap on 8/5  Repeat CBC, BMP, Hepatic profile on Monday.   We are unable to check ammonia level here   3. Hepatic encephalopathy (H)  No acute confusion noted   4. Hyponatremia  Na+ stable at 127   5. Thrombocytopenia (H)  Followed by hematology   6. Anemia, unspecified type  Hemoglobin stable at 10.4. No evidence of bleed.    7.      Hypotension                                                             Multifactorial. Low albumin at 1.6. RD to consult                                                                                            Continue compression 4+ edema. Conservative on fluids.       Pt  has been moved up on the liver transplant list per GI today.     Electronically signed by: Nicci Pugh, SAIMA

## 2021-06-19 NOTE — LETTER
Letter by Nicci Pugh CNP at      Author: Nicci Pugh CNP Service: -- Author Type: --    Filed:  Encounter Date: 8/12/2019 Status: (Other)         Patient: Nicci Pemberton   MR Number: [id number]   YOB: 1960   Date of Visit: 8/12/2019     Code Status:  FULL CODE  Visit Type: Problem Visit     Facility:  [facility] [[id number]]             History of Present Illness: Nicci Pemberton is a 59 y.o. female who I am seeing today for follow up on the TCU. Pt recently hospitalized on 7/31/19.  Along with iron overload secondary patient with a history of an ELIZA cirrhosis to alpha-1 antitrypsin heterozygous on the liver transplant list at the [facility], hypothyroidism, hypertension, and hernia.  Patient complained of a 2-day history of frequent watery diarrhea with bright red blood from her rectum with increased lower abdominal pain.  ER work-up and CT of her abdomen and pelvis showed enterocolitis, cirrhotic changes of the liver with moderate ascites and splenomegaly measuring 15 cm.  She underwent paracentesis on 7/31/2019 with a removal of 0.4 L of fluid.  Ascitic fluid showed WBCs with 88% PMNs consistent with SBP.  Fluid culture however later showed no growth but Gram stain showed 3+ p.m. with no organisms seen.  She was initially treated with Zosyn and Flagyl.  She was seen by GI/hepatology.  In the MELD was 31.  Previously 24 on 7/11/2019.  Patient also found to have anemia and thrombocytopenia, hyponatremia from her cirrhosis and low BP.  She did develop hepatic encephalopathy with intermittent confusion.  She was started on rifaximin on 8/5.  Her lactulose dose was increased and home dose diuretics were resumed.  She underwent ultrasound-guided paracentesis again on 8/5 with 50 cc of clear fluid removed.  Culture showed no growth.  However it did have 80% PMNs.  She was started on Cipro orally for STD prophylaxis.      Today patient lying in bed.  She is alert and  oriented x3.  She denies any abdominal discomfort.  She tells me she is having ~9 loose stools per day.  She reports anything she puts and comes out.  She continues on lactulose twice daily.  She is very jaundiced.  She denies any itching.  No tremors.  She is followed at the [facility].  She continues with lower extremity edema.  She is receiving lymphedema wraps.  She continues on Bumex.  On Cipro for total of 14 days.  She was previously taking oxycodone however this was discontinued per Dr. Crespo.  She is eating fairly well.  She is emptying her bladder.  Her blood pressure is on the low.  Nursing staff report with the electronic cuff in the 70s however manually in the 90s systolically.  She denies any dizziness.  She is tolerating therapy.  Her white count was slightly elevated at 12.0 on 8/10.  Previously 9.8 on 8/6.  Again afebrile.  Abdominal girth continues to be the same as admit.  Her weight is down 3 pounds.  She has hyper bilirubinemia.  Previously  Ted at 18 now at 21.1.  Her sodium is 131 which has been stable.  She denies any shortness of breath or chest pain.      Active Ambulatory Problems     Diagnosis Date Noted   ? Edema 11/23/2016   ? Hypothyroidism 11/23/2016   ? Venous hypertension, chronic, with ulcer and inflammation, left (H) 11/23/2016   ? Venous hypertension, chronic, with inflammation, right 11/23/2016   ? Acquired lymphedema of leg 11/23/2016   ? Peripheral neuropathy 11/23/2016   ? Vitamin D deficiency 11/23/2016   ? Zinc deficiency    ? Obesity with serious comorbidity 08/23/2017   ? Cirrhosis of liver (H) 06/18/2018   ? Leg swelling 07/29/2019   ? Venous hypertension of lower extremity, bilateral 07/29/2019   ? Osteoarthritis of both knees, unspecified osteoarthritis type 07/29/2019   ? Chronic pain of both knees 07/29/2019   ? Cramp in lower extremity associated with sleep 07/29/2019   ? Enterocolitis 07/31/2019     Resolved Ambulatory Problems     Diagnosis Date  Noted   ? Cellulitis of left lower extremity 09/27/2016   ? Morbid obesity with BMI of 40.0-44.9, adult (H) 11/23/2016     Past Medical History:   Diagnosis Date   ? Abnormal LFTs    ? Bruising    ? Cellulitis    ? Edema of both legs    ? Fatty liver    ? Fatty metamorphosis    ? Hypothyroid    ? Measles    ? Mumps    ? Urinary incontinence    ? Zinc deficiency        Current Outpatient Medications   Medication Sig   ? bumetanide (BUMEX) 1 MG tablet Take 1 mg by mouth daily.   ? cholecalciferol, vitamin D3, 1,000 unit tablet Take 1,000 Units by mouth 2 (two) times a day.          ? ciprofloxacin HCl (CIPRO) 500 MG tablet Take 1 tablet (500 mg total) by mouth Daily at 6:00 am for 14 days.   ? famotidine (PEPCID) 20 MG tablet Take 20 mg by mouth 2 (two) times a day.          ? lactulose (ENULOSE) 20 gram/30 mL Soln solution Take 30 mL (20 g total) by mouth 3 (three) times a day.   ? levothyroxine (SYNTHROID, LEVOTHROID) 125 MCG tablet Take 125 mcg by mouth daily.   ? magnesium oxide (MAG-OX) 400 mg tablet Take 400 mg by mouth daily.   ? rifAXIMin (XIFAXAN) 550 mg Tab tablet Take 1 tablet (550 mg total) by mouth 2 (two) times a day.   ? spironolactone (ALDACTONE) 100 MG tablet Take 100 mg by mouth 2 (two) times a day at 9am and 6pm.              Allergies   Allergen Reactions   ? Sulfa (Sulfonamide Antibiotics) Other (See Comments)     Joint swelling   ? Lasix [Furosemide] Rash         Review of Systems  No fevers or chills. No headache, lightheadedness or dizziness. No SOB, chest pains or palpitations.  Appears alert and oriented x3.  Appetite is fair.. No nausea, vomiting, constipation she reports about 9 loose stools per day. No dysuria, frequency, burning or pain with urination. Otherwise review of systems are negative.     Physical Exam  PHYSICAL EXAMINATION:  Vital signs: BP 90/58, pulse 78, respirations 16, temperature 98.1, O2 sat 91% on room air.  Current weight 231.8 pounds.  Seeing today for follow-up on the  TCU.  Patient recently hospitalized  General: Awake, Alert, oriented x3, appropriately, follows simple commands, conversant  HEENT:PERRLA, jaundice conjunctiva,sclerae icterus, moist oral mucosa  NECK: Supple, without any lymphadenopathy, or masses  CVS:  S1  S2, without murmur or gallop.   LUNG: Clear to auscultation, No wheezes, rales or rhonci.  BACK: No kyphosis of the thoracic spine  ABDOMEN: Soft, obese, soft, nontender to palpation, with positive bowel sounds  EXTREMITIES: Good range of motion on both upper and lower extremities, 4+ pedal edema, compressionin place,, no calf tenderness  SKIN: Warm and dry, no rashes or erythema noted  NEUROLOGIC: Intact, pulses palpable  PSYCHIATRIC: Cognition intact      Labs:      Recent Results (from the past 240 hour(s))   HM2(CBC w/o Differential)   Result Value Ref Range    WBC 8.9 4.0 - 11.0 thou/uL    RBC 2.54 (L) 3.80 - 5.40 mill/uL    Hemoglobin 9.8 (L) 12.0 - 16.0 g/dL    Hematocrit 28.7 (L) 35.0 - 47.0 %     (H) 80 - 100 fL    MCH 38.6 (H) 27.0 - 34.0 pg    MCHC 34.1 32.0 - 36.0 g/dL    RDW 13.2 11.0 - 14.5 %    Platelets 39 (LL) 140 - 440 thou/uL    MPV 9.5 8.5 - 12.5 fL   Basic Metabolic Panel   Result Value Ref Range    Sodium 128 (L) 136 - 145 mmol/L    Potassium 4.3 3.5 - 5.0 mmol/L    Chloride 97 (L) 98 - 107 mmol/L    CO2 29 22 - 31 mmol/L    Anion Gap, Calculation 2 (L) 5 - 18 mmol/L    Glucose 84 70 - 125 mg/dL    Calcium 8.9 8.5 - 10.5 mg/dL    BUN 42 (H) 8 - 22 mg/dL    Creatinine 0.84 0.60 - 1.10 mg/dL    GFR MDRD Af Amer >60 >60 mL/min/1.73m2    GFR MDRD Non Af Amer >60 >60 mL/min/1.73m2   Hepatic Profile   Result Value Ref Range    Bilirubin, Total 18.3 (HH) 0.0 - 1.0 mg/dL    Bilirubin, Direct 8.7 (H) <=0.5 mg/dL    Protein, Total 4.8 (L) 6.0 - 8.0 g/dL    Albumin 1.8 (L) 3.5 - 5.0 g/dL    Alkaline Phosphatase 89 45 - 120 U/L    AST 58 (H) 0 - 40 U/L    ALT 32 0 - 45 U/L   Hepatic Profile   Result Value Ref Range    Bilirubin, Total 16.1 (H)  0.0 - 1.0 mg/dL    Bilirubin, Direct 7.8 (H) <=0.5 mg/dL    Protein, Total 4.7 (L) 6.0 - 8.0 g/dL    Albumin 1.7 (L) 3.5 - 5.0 g/dL    Alkaline Phosphatase 98 45 - 120 U/L    AST 65 (H) 0 - 40 U/L    ALT 35 0 - 45 U/L   INR   Result Value Ref Range    INR 2.90 (H) 0.90 - 1.10   Basic Metabolic Panel   Result Value Ref Range    Sodium 128 (L) 136 - 145 mmol/L    Potassium 4.4 3.5 - 5.0 mmol/L    Chloride 98 98 - 107 mmol/L    CO2 29 22 - 31 mmol/L    Anion Gap, Calculation 1 (L) 5 - 18 mmol/L    Glucose 68 (L) 70 - 125 mg/dL    Calcium 8.7 8.5 - 10.5 mg/dL    BUN 34 (H) 8 - 22 mg/dL    Creatinine 0.70 0.60 - 1.10 mg/dL    GFR MDRD Af Amer >60 >60 mL/min/1.73m2    GFR MDRD Non Af Amer >60 >60 mL/min/1.73m2   Ammonia   Result Value Ref Range    Ammonia 48 (H) 11 - 35 umol/L   Basic Metabolic Panel   Result Value Ref Range    Sodium 128 (L) 136 - 145 mmol/L    Potassium 4.2 3.5 - 5.0 mmol/L    Chloride 98 98 - 107 mmol/L    CO2 30 22 - 31 mmol/L    Anion Gap, Calculation 0 (L) 5 - 18 mmol/L    Glucose 78 70 - 125 mg/dL    Calcium 8.7 8.5 - 10.5 mg/dL    BUN 24 (H) 8 - 22 mg/dL    Creatinine 0.68 0.60 - 1.10 mg/dL    GFR MDRD Af Amer >60 >60 mL/min/1.73m2    GFR MDRD Non Af Amer >60 >60 mL/min/1.73m2   Ammonia   Result Value Ref Range    Ammonia 46 (H) 11 - 35 umol/L   Hepatic Profile   Result Value Ref Range    Bilirubin, Total 15.2 (H) 0.0 - 1.0 mg/dL    Bilirubin, Direct 7.5 (H) <=0.5 mg/dL    Protein, Total 4.5 (L) 6.0 - 8.0 g/dL    Albumin 1.6 (L) 3.5 - 5.0 g/dL    Alkaline Phosphatase 105 45 - 120 U/L    AST 75 (H) 0 - 40 U/L    ALT 34 0 - 45 U/L   Protime-INR   Result Value Ref Range    INR 3.02 (H) 0.90 - 1.10   Enteric Pathogen panel (per [facility] request) - Misc. Lab Test   Result Value Ref Range    Miscellanous Test Dept. Chemistry Reference Test     Test Name Enteric Pathogen Panel      Performing Lab       [facility]  [facility] [id number] [facility] [phone] [facility] [place] [address]      Scan Result See Scanned Report    Cell Ct/Diff, Body Fluid   Result Value Ref Range    Color, Fluid Yellow     Appearance, Fluid Hazy     WBC, Fluid 1,811 (H) 0 - 99 /uL    RBC, Fluid <50,000 <50,000 /ul    Neutrophil % 80 (H) <=25 %    Lymphocyte % 5 <=78 %    Monocyte % 15 <=71 %    Macrophage %  <=71 %    Mesothelial %  <=1 %    Eosinophil %  <=1 %    Other Cells %  <=1 %   Albumin, Body Fluid   Result Value Ref Range    Albumin, Fluid <0.6 g/dL   Glucose, Body Fluid   Result Value Ref Range    Glucose, Fluid 80 mg/dL   Culture/Gram Stain: Body Fluid   Result Value Ref Range    Culture No Growth     Gram Stain Result 3+ Polymorphonuclear leukocytes     Gram Stain Result No organisms seen    HM2(CBC w/o Differential)   Result Value Ref Range    WBC 9.8 4.0 - 11.0 thou/uL    RBC 2.71 (L) 3.80 - 5.40 mill/uL    Hemoglobin 10.8 (L) 12.0 - 16.0 g/dL    Hematocrit 30.8 (L) 35.0 - 47.0 %     (H) 80 - 100 fL    MCH 39.9 (H) 27.0 - 34.0 pg    MCHC 35.1 32.0 - 36.0 g/dL    RDW 14.8 (H) 11.0 - 14.5 %    Platelets 48 (LL) 140 - 440 thou/uL    MPV 9.5 8.5 - 12.5 fL   Comprehensive Metabolic Panel   Result Value Ref Range    Sodium 130 (L) 136 - 145 mmol/L    Potassium 3.9 3.5 - 5.0 mmol/L    Chloride 98 98 - 107 mmol/L    CO2 30 22 - 31 mmol/L    Anion Gap, Calculation 2 (L) 5 - 18 mmol/L    Glucose 90 70 - 125 mg/dL    BUN 19 8 - 22 mg/dL    Creatinine 0.69 0.60 - 1.10 mg/dL    GFR MDRD Af Amer >60 >60 mL/min/1.73m2    GFR MDRD Non Af Amer >60 >60 mL/min/1.73m2    Bilirubin, Total 16.4 (H) 0.0 - 1.0 mg/dL    Calcium 8.6 8.5 - 10.5 mg/dL    Protein, Total 4.9 (L) 6.0 - 8.0 g/dL    Albumin 1.7 (L) 3.5 - 5.0 g/dL    Alkaline Phosphatase 118 45 - 120 U/L    AST 90 (H) 0 - 40 U/L    ALT 39 0 - 45 U/L   Protime-INR   Result Value Ref Range    INR 2.93 (H) 0.90 - 1.10   Comprehensive Metabolic Panel   Result Value Ref Range    Sodium 131 (L) 136 - 145 mmol/L    Potassium 3.8 3.5 - 5.0 mmol/L     Chloride 98 98 - 107 mmol/L    CO2 29 22 - 31 mmol/L    Anion Gap, Calculation 4 (L) 5 - 18 mmol/L    Glucose 75 70 - 125 mg/dL    BUN 15 8 - 22 mg/dL    Creatinine 0.76 0.60 - 1.10 mg/dL    GFR MDRD Af Amer >60 >60 mL/min/1.73m2    GFR MDRD Non Af Amer >60 >60 mL/min/1.73m2    Bilirubin, Total 15.6 (H) 0.0 - 1.0 mg/dL    Calcium 8.0 (L) 8.5 - 10.5 mg/dL    Protein, Total 4.8 (L) 6.0 - 8.0 g/dL    Albumin 1.5 (L) 3.5 - 5.0 g/dL    Alkaline Phosphatase 126 (H) 45 - 120 U/L    AST 91 (H) 0 - 40 U/L    ALT 42 0 - 45 U/L   Protime-INR   Result Value Ref Range    INR 2.90 (H) 0.90 - 1.10   Hemoglobin   Result Value Ref Range    Hemoglobin 10.4 (L) 12.0 - 16.0 g/dL   Hepatic Profile   Result Value Ref Range    Bilirubin, Total 18.0 (H) 0.0 - 1.0 mg/dL    Bilirubin, Direct 8.1 (H) <=0.5 mg/dL    Protein, Total 4.9 (L) 6.0 - 8.0 g/dL    Albumin 1.6 (L) 3.5 - 5.0 g/dL    Alkaline Phosphatase 123 (H) 45 - 120 U/L    AST 96 (H) 0 - 40 U/L    ALT 40 0 - 45 U/L   Platelet Count   Result Value Ref Range    Platelets 48 (LL) 140 - 440 thou/uL   Sodium   Result Value Ref Range    Sodium 130 (L) 136 - 145 mmol/L   White Blood Count (WBC)   Result Value Ref Range    WBC 12.0 (H) 4.0 - 11.0 thou/uL   Creatinine   Result Value Ref Range    Creatinine 1.11 (H) 0.60 - 1.10 mg/dL    GFR MDRD Af Amer >60 >60 mL/min/1.73m2    GFR MDRD Non Af Amer 50 (L) >60 mL/min/1.73m2   INR   Result Value Ref Range    INR 2.86 (H) 0.90 - 1.10   Sodium   Result Value Ref Range    Sodium 131 (L) 136 - 145 mmol/L   Albumin   Result Value Ref Range    Albumin 1.6 (L) 3.5 - 5.0 g/dL   Bilirubin, Total   Result Value Ref Range    Bilirubin, Total 21.1 (HH) 0.0 - 1.0 mg/dL         Assessment/Plan:  1. Spontaneous bacterial peritonitis (H)  Continues on Cipro for 14 days.  WBC slightly elevated at 12.  A-febrile. No confusion.  Repeat CBC    2. Cirrhosis of liver with ascites, unspecified hepatic cirrhosis type (H)  Continues on Rifaximin. Followed by GI at the  [facility].   Hyperbilirubinemia. Bilirubin up to 21 from 18.   Juandice.   GI updated with labs at the [facility]. Awaiting response.   Continues on Lactulose ~9 stools per day.   Last tap on 8/5  Repeat CBC, BMP, Hepatic profile.   We are unable to check ammonia level here   3. Hepatic encephalopathy (H)  No acute confusion noted   4. Hyponatremia  Na+ stable at 13a   5. Thrombocytopenia (H)  Followed by hematology   6. Anemia, unspecified type  Hemoglobin stable at 10.4. No evidence of bleed.    7.      Hypotension                                                             Multifactorial. Low albumin at 1.6. RD to consult                                                                                            Continue compression 4+ edema. Conservative on fluids.       60 minutes spent of which greater than 50% was face to face communication with the patient about labs, liver treatment and follow up.     Electronically signed by: Nicci Pugh CNP

## 2021-06-19 NOTE — LETTER
Letter by Carlo Boo DO at      Author: Carlo Boo DO Service: -- Author Type: --    Filed:  Encounter Date: 8/16/2019 Status: (Other)         Patient: Nicci Pemberton   MR Number: [id number]   YOB: 1960   Date of Visit: 8/16/2019     [facility]    Facility:   [facility] [[id number]]   Code Status: FULL CODE      CHIEF COMPLAINT/REASON FOR VISIT:  Chief Complaint   Patient presents with   ? Problem Visit     Generalized feeling of malaise with new onset over 2 days of abdominal pain and some confusion.       HISTORY:      HPI: Nicci is a 59 y.o. 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  She does have end-stage liver disease with a meld score of greater than 31 which is worsening.  She had gone and underwent paracentesis on 31 2019 and she did have spontaneous bacterial peritonitis that was treated appropriately.Patient asked to see me this morning through the nursing staff secondary to not feeling well.  She had lethargy fatigue and blood pressure was low in the systolics in the 80s.  She had been running low when she also I did review her labs the nurse practitioner did order for the 14th which is 2 days ago indicated worsening liver function test as well as worsening BUN/creatinine.  Also sodium was down to 127.Patient having has having 9 bowel movements a day and is likely dehydrated with a worsening creatinine from 0.71 up to 1.88 and a BUN elevated with a low sodium is indicative of acute kidney injury as well as end-stage liver disease.  She does have a worsening meld score at this time I do not have an INR at this time however it had been elevated in the hospital. Patient is  currently also having some abdominal pain at this time which is concerning for spontaneous bacterial peritonitis.  She does deny any febrile episodes at this time and staff indicates she has not had any fevers.    I did discuss with patient in detail about options of treatment.  Here I would like to get a repeat of liver function test is also possibly give some IV fluids.  However patient was insistent on going to the hospital at this time secondary to she is in line for liver transplant in our kidneys are starting to fail.  We cannot do an ammonia level here given the fact the patient is somewhat foggy at this time however she is on lactulose twice a day and having 9 bowel movements.  It is unlikely that the ammonia would be high but we cannot do that here at the facility.    Past Medical History:   Diagnosis Date   ? Abnormal LFTs    ? Bruising    ? Cellulitis     L leg   ? Edema of both legs    ? Fatty liver    ? Fatty metamorphosis    ? Hypothyroid    ? Measles    ? Mumps    ? Urinary incontinence    ? Zinc deficiency              Family History   Problem Relation Age of Onset   ? No Medical Problems Mother    ? Restless legs syndrome Father    ? Mental illness Sister    ? Alcohol abuse Brother    ? No Medical Problems Son    ? Mental illness Maternal Grandfather    ? Heart failure Paternal Grandmother    ? No Medical Problems Son    ? No Medical Problems Maternal Grandmother    ? Breast cancer Paternal Aunt      Social History     Socioeconomic History   ? Marital status:      Spouse name: None   ? Number of children: None   ? Years of education: None   ? Highest education level: None   Occupational History   ? None   Social Needs   ? Financial resource strain: None   ? Food insecurity:     Worry: None     Inability: None   ? Transportation needs:     Medical: None     Non-medical: None   Tobacco Use   ? Smoking status: Former Smoker     Packs/day: 1.00     Years: 35.00     Pack years: 35.00     Types:  Cigarettes     Last attempt to quit: 10/20/2011     Years since quittin.8   ? Smokeless tobacco: Never Used   Substance and Sexual Activity   ? Alcohol use: Yes     Alcohol/week: 0.6 oz     Types: 1 Cans of beer per week     Comment: social   ? Drug use: No   ? Sexual activity: Yes     Partners: Male     Birth control/protection: Post-menopausal   Lifestyle   ? Physical activity:     Days per week: None     Minutes per session: None   ? Stress: None   Relationships   ? Social connections:     Talks on phone: None     Gets together: None     Attends Shinto service: None     Active member of club or organization: None     Attends meetings of clubs or organizations: None     Relationship status: None   ? Intimate partner violence:     Fear of current or ex partner: None     Emotionally abused: None     Physically abused: None     Forced sexual activity: None   Other Topics Concern   ? None   Social History Narrative    .  2 boys.  .  HR.          Review of Systems   Constitutional:        Patient claims to have abdominal pain generally general lethargy and fatigue.  She does not believe she does feel queasy and nauseous at this time but has not vomited.  She is having multiple episodes of diarrhea and abdominal pain.  She has been afebrile.  And the remainder the review of systems is negative.       .  Vitals:    19 0955   BP: (!) 89/54   Pulse: 84   Resp: 22   Temp: 98.6  F (37  C)   SpO2: 92%       Physical Exam   Constitutional: No distress.   HENT:   Head: Normocephalic and atraumatic.   Nose: Nose normal.   Eyes: Conjunctivae are normal. Right eye exhibits no discharge. Left eye exhibits no discharge. Scleral icterus is present.   Neck: Neck supple. No thyromegaly present.   Cardiovascular: Normal rate and regular rhythm.   Pulmonary/Chest: Effort normal and breath sounds normal. No respiratory distress.   Abdominal: Soft. Bowel sounds are normal. She exhibits distension. There is  tenderness.   Musculoskeletal: She exhibits edema.   Lymphadenopathy:     She has no cervical adenopathy.   Neurological: She is alert. She exhibits normal muscle tone.   Skin: Skin is warm and dry. She is not diaphoretic.   Patient does have positive jaundice.   Psychiatric: She has a normal mood and affect. Her behavior is normal.         LABS:  I did review labs from the 14th which was 2 days ago.Sodium was 127, potassium was 4.5, calcium was 8.1 these are all worsening from before.  BUN was 30 and her creatinine was 1.88 which is up from 0.75 and her GFR is 33.  Bilirubin is up to 22.9 and direct was 11.8.  Phosphate is up to 142 with ALT is 40 and AST is 92.      ASSESSMENT:      ICD-10-CM    1. Spontaneous bacterial peritonitis (H) K65.2    2. Cirrhosis of liver with ascites, unspecified hepatic cirrhosis type (H) K74.60     R18.8    3. Hepatic encephalopathy (H) K72.90    4. Hyponatremia E87.1    5. Hypotension due to hypovolemia I95.89     E86.1    6. Acute kidney injury (H) N17.9        PLAN:  Plan at this time will send to [facility] emergently secondary multiple issues.  #1 worsening meld score at this time with worsening kidney function as well as hypotension and likely fluid depletion.  It would not be a good idea to do fluids here at this time with the possibility of her filling up with fluid very rapidly.  She is on the list for a liver transplant and she needs to have her kidneys taken care of.  She also has low sodium which we could be contributing to her not feeling well and on the 14th it was 127 so it could be significantly lower at this time.  With this we will send the [facility] I did discuss with the nurse at the [facility] emergency room and she is can be sent to the [employer].  45 minutes was spent on this follow-up critical care visit with greater than 50% of the time dedicated to coordination care this included discussion with nursing staff as well as  nurse manager as well as the nurse on the phone in the emergency room at the [facility].      Total  minutes of which % was spent counseling and coordination of care of the above plan.    Electronically signed by: Carlo Boo DO

## 2021-06-20 NOTE — LETTER
Letter by Janna Montero RN at      Author: Janna Montero RN Service: -- Author Type: --    Filed:  Encounter Date: 5/11/2020 Status: (Other)       5/11/2020        Nicci Pemberton  [address] [facility] [address]    This letter provides a written record that you were tested for COVID-19 on 5/10/20.     Your result was negative.    This means that we didnt find the virus that causes COVID-19 in your sample. A test may show negative when you do actually have the virus. This can happen when the virus is in the early stages of infection, before you feel illness symptoms.    Even if you dont have symptoms, they may still appear. For safety, its very important to follow these rules.    Keep yourself away from others (self-isolation):      Stay home. Dont go to work, school or anywhere else.     Stay in your own room (and use your own bathroom), if you can.    Stay away from others in your home. No hugging, kissing or shaking hands. No visitors.    Clean high touch surfaces often (doorknobs, counters, handles, etc.). Use a household cleaning spray or wipes.    Cover your mouth and nose with a mask, tissue or washcloth to avoid spreading germs.    Wash your hands and face often with soap and water.    Stay in self-isolation until you meet ALL of the guidelines below:    1. You have had no fever for at least 72 hours (that is 3 full days of no fever without the use of medicine that reduces fevers), AND  2. other symptoms (such as cough, shortness of breath) have gotten better, AND  3. at least 10 days have passed since your symptoms first appeared.    Going back to work  Check with your employer for any guidelines to follow for going back to work.    Employers: This document serves as formal notice that your employee tested negative for COVID-19, as of the testing date shown above.    For questions regarding this letter or your Negative COVID-19 result, call [phone] between 8A to 6:30P (M-F) and 10A  to 6:30P (weekends).

## 2021-06-23 ENCOUNTER — TELEPHONE (OUTPATIENT)
Dept: TRANSPLANT | Facility: CLINIC | Age: 61
End: 2021-06-23

## 2021-06-23 NOTE — TELEPHONE ENCOUNTER
Patient Call: Voicemail  Date/Time: 6/23/2021  [id number]  Reason for call: Pt would like Crystal to call her back

## 2021-06-24 DIAGNOSIS — T86.41 LIVER TRANSPLANT REJECTION (H): ICD-10-CM

## 2021-06-24 LAB
ALBUMIN SERPL-MCNC: 3.5 G/DL (ref 3.4–5)
ALP SERPL-CCNC: 107 U/L (ref 40–150)
ALT SERPL W P-5'-P-CCNC: 30 U/L (ref 0–50)
ANION GAP SERPL CALCULATED.3IONS-SCNC: 6 MMOL/L (ref 3–14)
AST SERPL W P-5'-P-CCNC: 33 U/L (ref 0–45)
BILIRUB DIRECT SERPL-MCNC: 0.3 MG/DL (ref 0–0.2)
BILIRUB SERPL-MCNC: 1.7 MG/DL (ref 0.2–1.3)
BUN SERPL-MCNC: 27 MG/DL (ref 7–30)
CALCIUM SERPL-MCNC: 8.9 MG/DL (ref 8.5–10.1)
CHLORIDE SERPL-SCNC: 104 MMOL/L (ref 94–109)
CO2 SERPL-SCNC: 29 MMOL/L (ref 20–32)
CREAT SERPL-MCNC: 1.16 MG/DL (ref 0.52–1.04)
CYCLOSPORINE BLD LC/MS/MS-MCNC: 83 UG/L (ref 50–400)
ERYTHROCYTE [DISTWIDTH] IN BLOOD BY AUTOMATED COUNT: 12.8 % (ref 10–15)
GFR SERPL CREATININE-BSD FRML MDRD: 51 ML/MIN/{1.73_M2}
GLUCOSE SERPL-MCNC: 86 MG/DL (ref 70–99)
HCT VFR BLD AUTO: 36.6 % (ref 35–47)
HGB BLD-MCNC: 12.4 G/DL (ref 11.7–15.7)
MCH RBC QN AUTO: 34.3 PG (ref 26.5–33)
MCHC RBC AUTO-ENTMCNC: 33.9 G/DL (ref 31.5–36.5)
MCV RBC AUTO: 101 FL (ref 78–100)
PLATELET # BLD AUTO: 148 10E9/L (ref 150–450)
POTASSIUM SERPL-SCNC: 4.4 MMOL/L (ref 3.4–5.3)
PROT SERPL-MCNC: 7 G/DL (ref 6.8–8.8)
RBC # BLD AUTO: 3.62 10E12/L (ref 3.8–5.2)
SODIUM SERPL-SCNC: 139 MMOL/L (ref 133–144)
TME LAST DOSE: 2215 H
WBC # BLD AUTO: 5.4 10E9/L (ref 4–11)

## 2021-06-24 PROCEDURE — 80158 DRUG ASSAY CYCLOSPORINE: CPT | Performed by: INTERNAL MEDICINE

## 2021-06-24 PROCEDURE — 80048 BASIC METABOLIC PNL TOTAL CA: CPT | Performed by: INTERNAL MEDICINE

## 2021-06-24 PROCEDURE — 80076 HEPATIC FUNCTION PANEL: CPT | Performed by: INTERNAL MEDICINE

## 2021-06-24 PROCEDURE — 85027 COMPLETE CBC AUTOMATED: CPT | Performed by: INTERNAL MEDICINE

## 2021-06-24 PROCEDURE — 36415 COLL VENOUS BLD VENIPUNCTURE: CPT | Performed by: INTERNAL MEDICINE

## 2021-06-25 ENCOUNTER — TELEPHONE (OUTPATIENT)
Dept: TRANSPLANT | Facility: CLINIC | Age: 61
End: 2021-06-25

## 2021-06-25 DIAGNOSIS — Z94.4 LIVER REPLACED BY TRANSPLANT (H): ICD-10-CM

## 2021-06-25 DIAGNOSIS — T86.41 LIVER TRANSPLANT REJECTION (H): ICD-10-CM

## 2021-06-25 RX ORDER — PREDNISONE 5 MG/1
2.5 TABLET ORAL DAILY
Qty: 30 TABLET | Refills: 0 | COMMUNITY
Start: 2021-06-25 | End: 2021-07-13

## 2021-06-25 RX ORDER — CYCLOSPORINE 25 MG/1
CAPSULE, LIQUID FILLED ORAL
Qty: 450 CAPSULE | Refills: 3 | Status: SHIPPED | OUTPATIENT
Start: 2021-06-25 | End: 2021-08-13

## 2021-06-25 NOTE — TELEPHONE ENCOUNTER
ISSUE:   Cyclosporine level 83 on 6/24, goal 100-150, dose 50 mg BID.    PLAN:   Please call patient and confirm this was an accurate 12-hour trough. Verify Cyclosporine dose 50 mg BID. Confirm no new medications or illness. Confirm no missed doses. If accurate trough and accurate dose, increase Cyclosporine dose to 50 mg AM and 75 mg PM and repeat labs in 2 weeks. Pt to decrease prednisone 2.5 mg daily.     OUTCOME:   Spoke with patient, they confirm accurate trough level and current dose 50 mg BID. Patient confirmed dose change to 50 mg AM and 75 mg PM and decrease prednisone to 2.5 mg daily to repeat labs in 2 weeks. Orders sent to preferred pharmacy for dose change and lab for repeat labs. Patient voiced understanding of plan.

## 2021-06-25 NOTE — PROGRESS NOTES
Progress Notes by Evelia De León MD at 2/15/2017  3:20 PM     Author: Evelia De León MD Service: -- Author Type: Physician    Filed: 2/15/2017  4:33 PM Encounter Date: 2/15/2017 Status: Signed    : Evelia De León MD (Physician)       Date of Service:  02/15/17    Date last seen by Dr. De León:  2017    PCP: Pedro Ricardo MD    Impression:  1. Venous hypertension of the legs bilaterally  2. Venous ulceration of the left anterior shin-near healed  3. Scarring and fibrosis of legs bilaterally  4. Acquired lymphedema of the legs bilaterally  5. Peripheral neuropathy per exam  6. Morbid obesity  7. Vit D deficiency     Plan:  1. Questions were answered.  2. Wound care:  Left leg   Bacitracin or triple antibiotic lite then medipore.     3. After permission was obtained 2% Lidocaine HCL jelly was applied, under clean conditions, the left, calf and venous stasis/insufficiency with venous hypertension ulceration(s) were debrided using currette. Devitalized and non viable tissue, along with any fibrin and slough, was removed to improve granulation tissue formation, stimulate wound healing, decrease overall bacteria load, disrupt biofilm formation and decrease edge senescence. Total excisional debridement was 0.04 sq cm into the epidermis/dermis. Ulcer was improved afterwards and . Depth is significantly decreased.    4. Continue compression and exercise.  5. On vit D replacement.  6. Working with bariatric medicine and has lost 17 pounds.   7. Patient will follow up in 6 weeks, or when needed, with me.    Time spent with patient 15 minutes with greater than 50% time in consultation, education and coordination of care.     ---------------------------------------------------------------------------------------------------------------------    Chief Complaint: Bilateral leg swelling and left anterior calf ulceration     History of Present Illness:     Nicci Pemberton returns to the  [facility] with bilateral leg swelling and a left anterior calf ulceration.  This was felt to be due to venous hypertension of the legs bilaterally which was long-standing.  There was secondary acquired lymphedema.  It was noted she had some peripheral neuropathy and was morbidly obese.  Treatment was started with debridement and the use of microcytic, emil and compression.  She was sent to therapy.  She is now using her compression stockings regularly.  She is exercising  She has been working with bariatrics and has lost 17 pounds.  Her legs are much better.  There is no significant pain.  Her left leg ulcer is very small now and she is pleased.  There has been no new numbness, tingling or weakness. There have been no new masses, rashes, or swellings of any other joints. There has been no new decrease in appetite, unexplained weight loss, abdominal bloating and bowel or bladder changes.  She has had no fevers.      Past Medical History:   Diagnosis Date   ? Abnormal LFTs    ? Bruising    ? Cellulitis     L leg   ? Edema of both legs    ? Fatty liver    ? Fatty metamorphosis    ? Hypothyroid    ? Measles    ? Mumps    ? Urinary incontinence        History reviewed. No pertinent surgical history.      Current Outpatient Prescriptions:   ?  ascorbic acid, vitamin C, (VITAMIN C) 100 MG tablet, Take 500 mg by mouth daily., Disp: , Rfl:   ?  calcium-vitamin D 500 mg(1,250mg) -200 unit per tablet, Take 1 tablet by mouth daily., Disp: , Rfl:   ?  cholecalciferol, vitamin D3, 1,000 unit tablet, Take 1,000 Units by mouth daily., Disp: , Rfl:   ?  levothyroxine (SYNTHROID, LEVOTHROID) 125 MCG tablet, Take 125 mcg by mouth daily., Disp: , Rfl:   ?  lysine 500 mg Tab, , Disp: , Rfl:   ?  phentermine (ADIPEX-P) 37.5 mg tablet, Start with 1/2 a tablet daily (18.75mg) for 14 days, if tolerating, go up to one full tablet daily on Day 15 and continue as tolerated., Disp: 30 tablet, Rfl: 0  ?  spironolactone  (ALDACTONE) 100 MG tablet, Take 200 mg by mouth daily., Disp: , Rfl:   ?  valACYclovir (VALTREX) 1000 MG tablet, TK 1 T PO BID FOR 7 DAYS, Disp: , Rfl: 1    Allergies   Allergen Reactions   ? Sulfa (Sulfonamide Antibiotics) Other (See Comments)     Joint swelling   ? Lasix [Furosemide] Rash       Social History     Social History   ? Marital status:      Spouse name: N/A   ? Number of children: N/A   ? Years of education: N/A     Occupational History   ? Not on file.     Social History Main Topics   ? Smoking status: Former Smoker     Packs/day: 1.00     Years: 35.00     Types: Cigarettes     Quit date: 10/20/2011   ? Smokeless tobacco: Never Used   ? Alcohol use 0.6 oz/week     1 Cans of beer per week      Comment: social   ? Drug use: No   ? Sexual activity: Yes     Partners: Male     Birth control/ protection: Post-menopausal     Other Topics Concern   ? Not on file     Social History Narrative    .  2 boys.  .  HR.        Family History   Problem Relation Age of Onset   ? No Medical Problems Mother    ? Restless legs syndrome Father    ? Mental illness Sister    ? Alcohol abuse Brother    ? No Medical Problems Son    ? Mental illness Maternal Grandfather    ? Heart failure Paternal Grandmother    ? No Medical Problems Son    ? No Medical Problems Maternal Grandmother        Review of Systems:  Nicci Pemberton no new numbess, tingling or weakness, redness or rashes, fevers, new masses, abdominal bloating or discomfort, unexplained weight loss, increased pain, new ulcers, shortness of breath and chest pain  Full 12 point review of systems was completed.    Imaging:  None    Labs:       Lab Results   Component Value Date    CREATININE 0.69 02/15/2017      No results found for: HGBA1C        Lab Results   Component Value Date    BUN 12 02/15/2017              Lab Results   Component Value Date    ALBUMIN 2.9 (L) 02/15/2017       VITAMIN D, TOTAL (25-HYDROXY)   Date Value Ref Range Status    11/23/2016 22.7 (L) 30.0 - 80.0 ng/mL Final         Physical Exam:  Vitals:    02/15/17 1609   BP: 110/66   Pulse: 67   Resp: 18   Temp: 98.8  F (37.1  C)    There is no height or weight on file to calculate BMI.    Circumferential measures:    Vasc Edema 11/23/2016 12/1/2016 1/4/2017 1/16/2017 2/15/2017   Right just above MTP 27 26.0 27 25.8 25.3   Right Ankle 33 29.2 33.5 29 28   Right Widest Calf 51 42.6 53.5 47.2 45.3   Right Thigh Up 10cm 67.5 - 67.5 67.5 67.5   Left - just above MTP 26.6 25.2 26.7 26.2 24.5   Left Ankle 35.5 29.6 35.7 30.2 29   Left Widest Calf 51 44.4 53.5 47 45.3   Left Thigh Up 10cm 66.5 - 66.5 66.5 66.5     Swelling measures are coming down very nicely.    Wound 11/23/16 L calf (Active)   Pre Size Length 0.4 1/4/2017  8:00 AM   Pre Size Width 0.5 1/4/2017  8:00 AM   Pre Total Sq cm 0.2 1/4/2017  8:00 AM   Undermined n 1/4/2017  8:00 AM   Tunneling n 1/4/2017  8:00 AM       General:  56 y.o. female in no apparent distress.  Alert and oriented x 3.  Cooperative. Affect normal.      Vascular: Dorsalis pedis and posterior tibialis pulses are strong and equal bilaterally. There are no significant telangietasias, medial ankle venous flares, venous varicosities and spider veins . There is normal capillary refill.      Integumentary: Skin of both legs is uniformly warm and dry and hyperpigmentated, with hyperkeratosis and keratoderma and with positive Stemmer's Sign.  The erythema has resolved.   There is significant fibrosis of the feet and toes, but this is much better.   Nails are normal. The ulceration along the mid right anterior shin is very small now.   There is some slough.  There is no odor or significant discharge.  Ulcer measures 0.2 X 0.2 cm and is 0.1 cm deep.   There is no jessi wound rubor or pain to palpation.  See flow sheet for measures.                                 Left anterior calf 1/4/17                         left anterior calf 1/16/17    Evelia De León MD, [facility] [place],  [place], [facility]  Medical Director Wound Care and Lymphedema  [facility]  [phone]

## 2021-06-25 NOTE — PROGRESS NOTES
Progress Notes by Shala Gracia PTA at 1/4/2017  9:30 AM     Author: Shala Gracia PTA Service: -- Author Type: Physical Therapist Assistant    Filed: 1/4/2017  1:27 PM Encounter Date: 1/4/2017 Status: Attested    : Shala Gracia PTA (Physical Therapist Assistant) Cosigner: Komal Wall PT at 1/5/2017  9:34 AM    Attestation signed by Komal Wall PT at 1/5/2017  9:34 AM    Review plan of care with PTA and will continue with POC as before with instructions for wound care from the [facility].                [facility] Daily Progress     Patient Name: Nicci Pemberton  Date: 1/4/2017  Visit #: 7  PTA visit #:  4  Referral Diagnosis: Swelling:  Venous, Lymphatic and Ulcers/wounds;  Right leg and Left leg   Referring provider: Pedro Ricardo MD  Visit Diagnosis:     ICD-10-CM    1. Acquired lymphedema of leg I89.0    2. Venous hypertension, chronic, with inflammation, right I87.321    3. Morbid obesity with BMI of 45.0-49.9, adult E66.01     Z68.42          Assessment:   Pt motivated to restart lymphedema program and manage swelling consistently  Patient demonstrates understanding/independence with home program.  Patient is benefitting from skilled physical therapy and is making steady progress toward functional goals.         Goal Status:  Patient will have a decreased volume in : bilateral;LE;by 5%;to decrease risk of infection;for better fit of clothing;for ease of movement;in 4 weeks;Progressing toward  Patient will have decreased fibrosis, scar tissue for improved lymphatic mobilitiy : in 4 weeks;Progressing toward  Patient will perform, verbalize self-management of: skin care;self-monitoring;exercise;massage;self-compression;compression wear;compression care;infection prevention;in 4 weeks;Progressing toward    Plan / Patient Education:     Continue with initial plan of care. Continue with manual lymph drainage, medical compression bandages, exercises, skin care, and  patient education,needs to coordinate garment fitting.  Pt to remove bandages if needed before next visit then don tubes for compression or trial self bandaging  See  MD orders for extended  PT 3-4x/week, up to 20 visits  Discussed bringing spouse in for bandaging instruction, but pt declined    Subjective:     Pain Ratin  Came from Vascular appointment 20' late,  Has to get to work, wants bandaging only, abad  Was up at [place] over the holidays and was using tubes for compression. Feels that legs have really swollen up without bandages  Objective:     Caregiver present: No    Observation of swelling: More puffy per pt    Volume measurements taken:  No      Skin condition is: Wound not  observed. New red rash R low leg, circumferentially  mid low leg, pt to monitor.    Compression:  No compression on   Came from [facility]    Medication for infection:  No    Treatment Today     TREATMENT MINUTES COMMENTS   Evaluation     Self-care/ Home management 35 Wound care performed at vascular center  Medical compression bandaging to to bilateral lower extremities: Applied Eucerin skin lotion, stockinet, [id number] rolls, comprilan 03 rolls.Used a small piece of Tubigrip on top to protect bandages.  Instructed to remove them if uncomfortable. Instructed in MCB techniques for self wrapping     Manual therapy  Manual therapy: manual lymph drainage for bilateral lower extremities lymphedema  Deep abdominal breath, neck effleurage, short neck, shoulder collectors, bilateral axilla, bilateral inguinal, anterior inguinal to axilla anastomosis on right side and left side, left lower extremity evacuation, right lower extremity evacuation, abdomen, neck effleurage.     Neuromuscular Re-education     Therapeutic Activity     Therapeutic Exercises     Gait training     Modality__________________                Total 35    Blank areas are intentional and mean the treatment did not include these items.       Shala Gracia  PTA,CLT  1/4/2017

## 2021-06-25 NOTE — PROGRESS NOTES
Progress Notes by Kat Mcneal PTA at 1/9/2017  4:30 PM     Author: Kat Mcneal PTA Service: -- Author Type: Physical Therapist Assistant    Filed: 1/9/2017  5:51 PM Encounter Date: 1/9/2017 Status: Attested    : Kat Mcneal PTA (Physical Therapist Assistant) Cosigner: Komal Wall PT at 1/10/2017 10:25 AM    Attestation signed by Komal Wall PT at 1/10/2017 10:25 AM    Physical Therapist observed treatment and reviewed patient's subjective and objective progress, progress towards goals and plan of care with the PTA.  It is appropriate to continue per Plan of Care.   Refer to continuation of orders from MD  Frequency: 3-4 per week Duration: 3-5 weeks    Therapies: Lymphedema:  Lymph exercise / education, Lymphatic massage, Compression bandaging, Send to orthotist for garment(s) when measures are stabilized and Check compression garments. MFR.                  [facility] Daily Progress     Patient Name: Nicci Pemberton  Date: 1/9/2017  Visit #: 9  PTA visit #:  6  Referral Diagnosis: Swelling:  Venous, Lymphatic and Ulcers/wounds;  Right leg and Left leg   Referring provider: Pedro Ricardo MD  Visit Diagnosis:     ICD-10-CM    1. Acquired lymphedema of leg I89.0    2. Venous hypertension, chronic, with inflammation, right I87.321    3. Morbid obesity with BMI of 45.0-49.9, adult E66.01     Z68.42          Assessment:   Bandages on from last visit slid down. L LE puffy above where bandages slid down.  Patient demonstrates understanding/independence with home program.  Patient is benefitting from skilled physical therapy and is making steady progress toward functional goals.         Goal Status:  Patient will have a decreased volume in : bilateral;LE;by 5%;to decrease risk of infection;for better fit of clothing;for ease of movement;in 4 weeks;Progressing toward  Patient will have decreased fibrosis, scar tissue for improved lymphatic mobilitiy : in 4  weeks;Progressing toward  Patient will perform, verbalize self-management of: skin care;self-monitoring;exercise;massage;self-compression;compression wear;compression care;infection prevention;in 4 weeks;Progressing toward    Plan / Patient Education:     Continue with initial plan of care. Continue with manual lymph drainage, medical compression bandages, exercises, skin care, and patient education,needs to coordinate garment fitting.  Pt to remove bandages if needed before next visit then don tubes for compression or trial self bandaging  See  MD orders for extended  PT 3-4x/week, up to 20 visits   Pt to follow up with Dr. De León on .  Plan for next visit: Re measure volumes      Subjective:   Pt reports she did her exercises over the weekend.  Pt to see bariatric MD on .  Pain Ratin    Objective:     Caregiver present: No    Observation of swelling: improved visibly    Volume measurements taken:  No      Skin condition is: Wound not  observed wound dressing on.    Compression:  Bandages on from last visit on .      Medication for infection:  No    Treatment Today     TREATMENT MINUTES COMMENTS   Evaluation     Self-care/ Home management 30   Medical compression bandaging to to bilateral lower extremities: Applied Eucerin skin lotion, stockinet, [id number] rolls, comprilan 03 rolls.Used a small piece of Tubigrip on top to protect bandages.  Instructed to remove them if uncomfortable. Instructed in MCB techniques for self wrapping     Manual therapy 30 Manual therapy: manual lymph drainage for bilateral lower extremities lymphedema  Deep abdominal breath, neck effleurage, short neck, shoulder collectors, bilateral axilla, bilateral inguinal, anterior inguinal to axilla anastomosis on right side and left side, left lower extremity evacuation, right lower extremity evacuation, abdomen, neck effleurage.     Neuromuscular Re-education     Therapeutic Activity     Therapeutic Exercises      Gait training     Modality__________________                Total 60    Blank areas are intentional and mean the treatment did not include these items.       Kat Mcneal PTA,CLT  1/9/2017

## 2021-06-25 NOTE — PROGRESS NOTES
Progress Notes by Evelia De León MD at 2017  8:20 AM     Author: Evelia De León MD Service: -- Author Type: Physician    Filed: 2017 12:32 PM Encounter Date: 2017 Status: Signed    : Evelia De León MD (Physician)       Date of Service: 2017     Date last seen by Dr. De León:  2016    Date last seen by this clinic:2016    PCP: Pedro Ricardo MD       Impression:  1. Venous hypertension of the legs bilaterally  2. Venous ulceration of the left anterior shin  3. Scarring and fibrosis of legs bilaterally  4. Acquired lymphedema of the legs bilaterally  5. Peripheral neuropathy per exam  6. Morbid obesity  7. Vit D deficiency     Plan:  1. Questions were answered.  2. Continue Microcyn then pack with emil then foam to left leg then PT to do wrapping..   3. After permission was obtained 2% Lidocaine HCL jelly was applied, under clean conditions, the left, calf and venous stasis/insufficiency with venous hypertension ulceration(s) were debrided using currette. Devitalized and non viable tissue, along with any fibrin and slough, was removed to improve granulation tissue formation, stimulate wound healing, decrease overall bacteria load, disrupt biofilm formation and decrease edge senescence. Total excisional debridement was 0.2 sq cm into the subcutaneous tissue. Ulcer was improved afterwards and . Depth is slightly decreased.  Measures were unchanged after debridement .  4. Restarting therapy for control of swelling, healing of ulceration. Then compression once swelling down. Ordered.  5. On vit D replacement.  7. Morbid obesity is contributing to many of her problems. Discussed bariatric medicine referral. Will be seeing soon.  8. Patient will follow up in 2 weeks with me.  To see PT and they will do dressing changes.      Time spent with patient 15 minutes with greater than 50% time in consultation, education and coordination of care.      ---------------------------------------------------------------------------------------------------------------------    Chief Complaint: Bilateral leg swelling     History of Present Illness:     Nicci Pemberton returns to the [facility] with Bilateral leg swelling. The swelling began over 4 years ago and is felt to be due to long-standing venous hypertension with insufficiency and secondary lymphedema complicated by morbid obesity.  She had ulceration on the left anterior shin.  Roseann was started 2 layer compression.  She was to go to therapy.  She started therapy but then when on vacation.  She did not work compression during vacation and the swelling came back.  She is scheduled to get going on therapy again and now understands how important it is to keep compression on.  She has more permanent compression written for and she will be getting this after the therapies had a chance to get the swelling down.  She was found to have mild vitamin D deficiency.  This is now being supplemented.  She is also seen bariatric medicine in about a week.  There has been no new numbness, tingling or weakness. There have been no new masses, rashes, or swellings of any other joints. There has been no new decrease in appetite, unexplained weight loss, abdominal bloating and bowel or bladder changes.       Past Medical History   Diagnosis Date   ? Abnormal LFTs    ? Bruising    ? Cellulitis      L leg   ? Edema of both legs    ? Fatty metamorphosis    ? Hypothyroid    ? Measles    ? Mumps        History reviewed. No pertinent past surgical history.      Current Outpatient Prescriptions:   ?  ascorbic acid, vitamin C, (VITAMIN C) 100 MG tablet, Take 500 mg by mouth daily., Disp: , Rfl:   ?  calcium-vitamin D 500 mg(1,250mg) -200 unit per tablet, Take 1 tablet by mouth daily., Disp: , Rfl:   ?  cholecalciferol, vitamin D3, 1,000 unit tablet, Take 1,000 Units by mouth daily., Disp: , Rfl:   ?  levothyroxine  (SYNTHROID, LEVOTHROID) 125 MCG tablet, Take 125 mcg by mouth daily., Disp: , Rfl:   ?  spironolactone (ALDACTONE) 50 MG tablet, Take 100 mg by mouth 2 (two) times a day at 9am and 6pm. , Disp: , Rfl:     Allergies   Allergen Reactions   ? Sulfa (Sulfonamide Antibiotics) Other (See Comments)     Joint swelling   ? Lasix [Furosemide] Rash       Social History     Social History   ? Marital status:      Spouse name: N/A   ? Number of children: N/A   ? Years of education: N/A     Occupational History   ? Not on file.     Social History Main Topics   ? Smoking status: Former Smoker     Packs/day: 1.00     Years: 35.00     Types: Cigarettes     Quit date: 10/20/2011   ? Smokeless tobacco: Never Used   ? Alcohol use 0.6 oz/week     1 Cans of beer per week      Comment: social   ? Drug use: No   ? Sexual activity: Yes     Other Topics Concern   ? Not on file     Social History Narrative    .  2 boys.  .  HR.        Family History   Problem Relation Age of Onset   ? No Medical Problems Mother    ? Restless legs syndrome Father    ? Mental illness Sister    ? Alcohol abuse Brother    ? No Medical Problems Son    ? Mental illness Maternal Grandfather    ? Heart failure Paternal Grandmother    ? No Medical Problems Son    ? No Medical Problems Maternal Grandmother        Review of Systems:  Nicci Pemberton no new numbess, tingling or weakness, redness or rashes, fevers, new masses, abdominal bloating or discomfort, unexplained weight loss, increased pain, new ulcers, shortness of breath and chest pain  Full 12 point review of systems was completed.    Imaging:    Labs:  No results found.  No results found for: SEDRATE      No results found for: CRP        Lab Results   Component Value Date    CREATININE 0.63 09/27/2016      No results found for: HGBA1C        Lab Results   Component Value Date    BUN 10 09/27/2016              Lab Results   Component Value Date    ALBUMIN 3.1 (L) 07/24/2015       VITAMIN  D, TOTAL (25-HYDROXY)   Date Value Ref Range Status   11/23/2016 22.7 (L) 30.0 - 80.0 ng/mL Final         Physical Exam:  Vitals:    01/04/17 0842   BP: 120/62   Pulse: 67   Resp: 18   Temp: 98.2  F (36.8  C)    There is no height or weight on file to calculate BMI.    Circumferential measures:    Vasc Edema 11/23/2016 12/1/2016 1/4/2017   Right just above MTP 27 26.0 27   Right Ankle 33 29.2 33.5   Right Widest Calf 51 42.6 53.5   Right Thigh Up 10cm 67.5 - 67.5   Left - just above MTP 26.6 25.2 26.7   Left Ankle 35.5 29.6 35.7   Left Widest Calf 51 44.4 53.5   Left Thigh Up 10cm 66.5 - 66.5     Swelling measures are up.    Wound 11/23/16 L calf (Active)   Pre Size Length 0.4 1/4/2017  8:00 AM   Pre Size Width 0.5 1/4/2017  8:00 AM   Pre Total Sq cm 0.2 1/4/2017  8:00 AM   Undermined n 1/4/2017  8:00 AM   Tunneling n 1/4/2017  8:00 AM       General:  56 y.o. female in no apparent distress.  Alert and oriented x 3.  Cooperative. Affect normal.    Musculoskeletal: Normal range of motion in hips, knees and ankles bilaterally throughout . There is no active joint synovitis, erythema, swelling or joint laxity.      Neurological: Sensation is decreased to pin prick and light touch in a stocking distribution. Strength testing is normal in  knee flexion, knee extension, ankle dorsiflexion and great toe extension bilaterally.       Vascular: Dorsalis pedis and posterior tibialis pulses are strong and equal bilaterally. There are no significant telangietasias, medial ankle venous flares, venous varicosities and spider veins . There is normal capillary refill.      Integumentary: Skin of both legs is uniformly warm and dry, erythematous, and hyperpigmentated, with hyperkeratosis and keratoderma and with positive Stemmer's Sign . There is significant fibrosis of the feet and toes. Nails are normal. Three is a 0.5 X 0.4 cm ulceration along the mid right anterior shin with significant slough and oval shape. There is no odor or  significant discharge. Depth was 0.3 cm. There is no jessi wound rubor or pain to palpation.         Left anterior calf 1/4/17    Evelia De León MD, ABWMS, FACCWS, [facility]  Medical Director Wound Care and Lymphedema  [facility]  [phone]

## 2021-06-25 NOTE — PROGRESS NOTES
Progress Notes by Evelia De León MD at 2017  8:00 AM     Author: Evelia De León MD Service: -- Author Type: Physician    Filed: 2017  8:53 AM Encounter Date: 2017 Status: Signed    : Evelia De León MD (Physician)       Date of Service:  17    Date last seen by Dr. De León:  2017      PCP: Pedro Ricardo MD    Impression:  1. Venous hypertension of the legs bilaterally  2. Venous ulceration of the left anterior shin-much improved  3. Scarring and fibrosis of legs bilaterally  4. Acquired lymphedema of the legs bilaterally  5. Peripheral neuropathy per exam  6. Morbid obesity  7. Vit D deficiency     Plan:  1. Questions were answered.  2. Continue Microcyn then pack with emil then foam to left leg then PT to do wrapping..   3. After permission was obtained 2% Lidocaine HCL jelly was applied, under clean conditions, the left, calf and venous stasis/insufficiency with venous hypertension ulceration(s) were debrided using currette. Devitalized and non viable tissue, along with any fibrin and slough, was removed to improve granulation tissue formation, stimulate wound healing, decrease overall bacteria load, disrupt biofilm formation and decrease edge senescence. Total excisional debridement was 0.04 sq cm into the epidermis/dermis. Ulcer was improved afterwards and . Depth is significantly decreased.   Measures were 0.2 X 0.2 with 0.2 depth after debridement.  4. Complete therapy for control of swelling, healing of ulceration. Compression written for.  She is ready to obtain this.   5. On vit D replacement.  7. Morbid obesity is contributing to many of her problems. Working with bariatric medicine and doing well.  8. Patient will follow up in 4 weeks, or when needed, with me.    Time spent with patient 15 minutes with greater than 50% time in consultation, education and coordination of care.      ---------------------------------------------------------------------------------------------------------------------    Chief Complaint: Bilateral leg swelling and left anterior calf ulceration     History of Present Illness:     Ncici Pemberton returns to the [facility] with bilateral leg swelling and a left anterior calf ulceration.  This was felt to be due to venous hypertension of the legs bilaterally which was long-standing.  There was secondary acquired lymphedema.  It was noted she had some peripheral neuropathy and was morbidly obese.  Treatment was started with debridement and the use of microcytic, emil and compression.  She was sent to therapy.  She is pleased to report the ulceration is doing much better.  Her legs are much better.  There is no significant pain.  She is seen bariatric medicine and very pleased.  She is starting to lose more weight.  There has been no new numbness, tingling or weakness. There have been no new masses, rashes, or swellings of any other joints. There has been no new decrease in appetite, unexplained weight loss, abdominal bloating and bowel or bladder changes.  She has had no fevers.      Past Medical History   Diagnosis Date   ? Abnormal LFTs    ? Bruising    ? Cellulitis      L leg   ? Edema of both legs    ? Fatty liver    ? Fatty metamorphosis    ? Hypothyroid    ? Measles    ? Mumps    ? Urinary incontinence        History reviewed. No pertinent past surgical history.      Current Outpatient Prescriptions:   ?  ascorbic acid, vitamin C, (VITAMIN C) 100 MG tablet, Take 500 mg by mouth daily., Disp: , Rfl:   ?  calcium-vitamin D 500 mg(1,250mg) -200 unit per tablet, Take 1 tablet by mouth daily., Disp: , Rfl:   ?  cholecalciferol, vitamin D3, 1,000 unit tablet, Take 1,000 Units by mouth daily., Disp: , Rfl:   ?  levothyroxine (SYNTHROID, LEVOTHROID) 125 MCG tablet, Take 125 mcg by mouth daily., Disp: , Rfl:   ?  spironolactone (ALDACTONE) 100 MG  tablet, Take 200 mg by mouth daily., Disp: , Rfl:   ?  phentermine (ADIPEX-P) 37.5 mg tablet, Start with 1/2 a tablet daily (18.75mg) for 14 days, if tolerating, go up to one full tablet daily on Day 15 and continue as tolerated., Disp: 30 tablet, Rfl: 0    Allergies   Allergen Reactions   ? Sulfa (Sulfonamide Antibiotics) Other (See Comments)     Joint swelling   ? Lasix [Furosemide] Rash       Social History     Social History   ? Marital status:      Spouse name: N/A   ? Number of children: N/A   ? Years of education: N/A     Occupational History   ? Not on file.     Social History Main Topics   ? Smoking status: Former Smoker     Packs/day: 1.00     Years: 35.00     Types: Cigarettes     Quit date: 10/20/2011   ? Smokeless tobacco: Never Used   ? Alcohol use 0.6 oz/week     1 Cans of beer per week      Comment: social   ? Drug use: No   ? Sexual activity: Yes     Partners: Male     Birth control/ protection: Post-menopausal     Other Topics Concern   ? Not on file     Social History Narrative    .  2 boys.  .  HR.        Family History   Problem Relation Age of Onset   ? No Medical Problems Mother    ? Restless legs syndrome Father    ? Mental illness Sister    ? Alcohol abuse Brother    ? No Medical Problems Son    ? Mental illness Maternal Grandfather    ? Heart failure Paternal Grandmother    ? No Medical Problems Son    ? No Medical Problems Maternal Grandmother        Review of Systems:  Nicci Pemberton no new numbess, tingling or weakness, redness or rashes, fevers, new masses, abdominal bloating or discomfort, unexplained weight loss, increased pain, new ulcers, shortness of breath and chest pain  Full 12 point review of systems was completed.    Imaging:  None    Labs:       Lab Results   Component Value Date    CREATININE 0.63 09/27/2016      No results found for: HGBA1C        Lab Results   Component Value Date    BUN 10 09/27/2016              Lab Results   Component Value Date     ALBUMIN 3.1 (L) 07/24/2015       VITAMIN D, TOTAL (25-HYDROXY)   Date Value Ref Range Status   11/23/2016 22.7 (L) 30.0 - 80.0 ng/mL Final         Physical Exam:  Vitals:    01/16/17 0813   BP: 100/66   Pulse: 67   Resp: 18   Temp: 98.6  F (37  C)    There is no height or weight on file to calculate BMI.    Circumferential measures:    Vasc Edema 11/23/2016 12/1/2016 1/4/2017 1/16/2017   Right just above MTP 27 26.0 27 25.8   Right Ankle 33 29.2 33.5 29   Right Widest Calf 51 42.6 53.5 47.2   Right Thigh Up 10cm 67.5 - 67.5 67.5   Left - just above MTP 26.6 25.2 26.7 26.2   Left Ankle 35.5 29.6 35.7 30.2   Left Widest Calf 51 44.4 53.5 47   Left Thigh Up 10cm 66.5 - 66.5 66.5     Swelling measures are coming down very nicely.    Wound 11/23/16 L calf (Active)   Pre Size Length 0.4 1/4/2017  8:00 AM   Pre Size Width 0.5 1/4/2017  8:00 AM   Pre Total Sq cm 0.2 1/4/2017  8:00 AM   Undermined n 1/4/2017  8:00 AM   Tunneling n 1/4/2017  8:00 AM       General:  56 y.o. female in no apparent distress.  Alert and oriented x 3.  Cooperative. Affect normal.      Vascular: Dorsalis pedis and posterior tibialis pulses are strong and equal bilaterally. There are no significant telangietasias, medial ankle venous flares, venous varicosities and spider veins . There is normal capillary refill.      Integumentary: Skin of both legs is uniformly warm and dry, erythematous, and hyperpigmentated, with hyperkeratosis and keratoderma and with positive Stemmer's Sign . There is significant fibrosis of the feet and toes. Nails are normal. The ulceration along the mid right anterior shin is much smaller.  There is some slough.  There is no odor or significant discharge. Depth was barely 0.2 cm in just a small area.. There is no jessi wound rubor or pain to palpation.  See photo and measures.                                 Left anterior calf 1/4/17                         left anterior calf 1/16/17    Evelia De León MD, ABWMS,  TARSHA, [facility]  Medical Director Wound Care and Lymphedema  [facility]  [phone]

## 2021-07-09 DIAGNOSIS — Z94.4 STATUS POST LIVER TRANSPLANTATION (H): Chronic | ICD-10-CM

## 2021-07-09 DIAGNOSIS — Z94.4 STATUS POST LIVER TRANSPLANTATION (H): Primary | Chronic | ICD-10-CM

## 2021-07-09 DIAGNOSIS — T86.41 LIVER TRANSPLANT REJECTION (H): ICD-10-CM

## 2021-07-09 LAB
ALBUMIN SERPL-MCNC: 3.5 G/DL (ref 3.4–5)
ALP SERPL-CCNC: 112 U/L (ref 40–150)
ALT SERPL W P-5'-P-CCNC: 35 U/L (ref 0–50)
ANION GAP SERPL CALCULATED.3IONS-SCNC: 5 MMOL/L (ref 3–14)
AST SERPL W P-5'-P-CCNC: 43 U/L (ref 0–45)
BILIRUB DIRECT SERPL-MCNC: 0.3 MG/DL (ref 0–0.2)
BILIRUB SERPL-MCNC: 1.3 MG/DL (ref 0.2–1.3)
BUN SERPL-MCNC: 25 MG/DL (ref 7–30)
CALCIUM SERPL-MCNC: 9.2 MG/DL (ref 8.5–10.1)
CHLORIDE SERPL-SCNC: 109 MMOL/L (ref 94–109)
CO2 SERPL-SCNC: 26 MMOL/L (ref 20–32)
CREAT SERPL-MCNC: 1.13 MG/DL (ref 0.52–1.04)
CYCLOSPORINE BLD LC/MS/MS-MCNC: 85 UG/L (ref 50–400)
ERYTHROCYTE [DISTWIDTH] IN BLOOD BY AUTOMATED COUNT: 12.2 % (ref 10–15)
GFR SERPL CREATININE-BSD FRML MDRD: 52 ML/MIN/{1.73_M2}
GLUCOSE SERPL-MCNC: 87 MG/DL (ref 70–99)
HCT VFR BLD AUTO: 37.5 % (ref 35–47)
HGB BLD-MCNC: 12.6 G/DL (ref 11.7–15.7)
MCH RBC QN AUTO: 34.6 PG (ref 26.5–33)
MCHC RBC AUTO-ENTMCNC: 33.6 G/DL (ref 31.5–36.5)
MCV RBC AUTO: 103 FL (ref 78–100)
PLATELET # BLD AUTO: 143 10E9/L (ref 150–450)
POTASSIUM SERPL-SCNC: 4.6 MMOL/L (ref 3.4–5.3)
PROT SERPL-MCNC: 7.1 G/DL (ref 6.8–8.8)
RBC # BLD AUTO: 3.64 10E12/L (ref 3.8–5.2)
SODIUM SERPL-SCNC: 140 MMOL/L (ref 133–144)
TME LAST DOSE: 2218 H
WBC # BLD AUTO: 6.6 10E9/L (ref 4–11)

## 2021-07-09 PROCEDURE — 80048 BASIC METABOLIC PNL TOTAL CA: CPT | Performed by: INTERNAL MEDICINE

## 2021-07-09 PROCEDURE — 80158 DRUG ASSAY CYCLOSPORINE: CPT | Performed by: INTERNAL MEDICINE

## 2021-07-09 PROCEDURE — 36415 COLL VENOUS BLD VENIPUNCTURE: CPT | Performed by: INTERNAL MEDICINE

## 2021-07-09 PROCEDURE — 80076 HEPATIC FUNCTION PANEL: CPT | Performed by: INTERNAL MEDICINE

## 2021-07-09 PROCEDURE — 85027 COMPLETE CBC AUTOMATED: CPT | Performed by: INTERNAL MEDICINE

## 2021-07-13 ENCOUNTER — RECORDS - HEALTHEAST (OUTPATIENT)
Dept: ADMINISTRATIVE | Facility: CLINIC | Age: 61
End: 2021-07-13

## 2021-07-13 ENCOUNTER — TELEPHONE (OUTPATIENT)
Dept: TRANSPLANT | Facility: CLINIC | Age: 61
End: 2021-07-13

## 2021-07-13 NOTE — TELEPHONE ENCOUNTER
Pt will stop prednisone. Pt will repeat labs in 2 weeks. In October will discuss mycophenolate stop.

## 2021-07-22 ENCOUNTER — LAB (OUTPATIENT)
Dept: LAB | Facility: CLINIC | Age: 61
End: 2021-07-22
Payer: COMMERCIAL

## 2021-07-22 DIAGNOSIS — Z94.4 STATUS POST LIVER TRANSPLANTATION (H): Chronic | ICD-10-CM

## 2021-07-22 LAB
ALBUMIN SERPL-MCNC: 3.7 G/DL (ref 3.4–5)
ALP SERPL-CCNC: 140 U/L (ref 40–150)
ALT SERPL W P-5'-P-CCNC: 40 U/L (ref 0–50)
ANION GAP SERPL CALCULATED.3IONS-SCNC: 4 MMOL/L (ref 3–14)
AST SERPL W P-5'-P-CCNC: 44 U/L (ref 0–45)
BILIRUB DIRECT SERPL-MCNC: 0.4 MG/DL (ref 0–0.2)
BILIRUB SERPL-MCNC: 1.6 MG/DL (ref 0.2–1.3)
BUN SERPL-MCNC: 23 MG/DL (ref 7–30)
CALCIUM SERPL-MCNC: 9.2 MG/DL (ref 8.5–10.1)
CHLORIDE BLD-SCNC: 106 MMOL/L (ref 94–109)
CO2 SERPL-SCNC: 29 MMOL/L (ref 20–32)
CREAT SERPL-MCNC: 1.07 MG/DL (ref 0.52–1.04)
CYCLOSPORINE BLD LC/MS/MS-MCNC: 96 UG/L (ref 50–400)
ERYTHROCYTE [DISTWIDTH] IN BLOOD BY AUTOMATED COUNT: 12.2 % (ref 10–15)
GFR SERPL CREATININE-BSD FRML MDRD: 56 ML/MIN/1.73M2
GLUCOSE BLD-MCNC: 85 MG/DL (ref 70–99)
HCT VFR BLD AUTO: 38.3 % (ref 35–47)
HGB BLD-MCNC: 13.1 G/DL (ref 11.7–15.7)
MCH RBC QN AUTO: 34.4 PG (ref 26.5–33)
MCHC RBC AUTO-ENTMCNC: 34.2 G/DL (ref 31.5–36.5)
MCV RBC AUTO: 101 FL (ref 78–100)
PLATELET # BLD AUTO: 144 10E3/UL (ref 150–450)
POTASSIUM BLD-SCNC: 4.2 MMOL/L (ref 3.4–5.3)
PROT SERPL-MCNC: 7.1 G/DL (ref 6.8–8.8)
RBC # BLD AUTO: 3.81 10E6/UL (ref 3.8–5.2)
SODIUM SERPL-SCNC: 139 MMOL/L (ref 133–144)
TME LAST DOSE: NORMAL H
TME LAST DOSE: NORMAL H
WBC # BLD AUTO: 5.3 10E3/UL (ref 4–11)

## 2021-07-22 PROCEDURE — 80053 COMPREHEN METABOLIC PANEL: CPT

## 2021-07-22 PROCEDURE — 85027 COMPLETE CBC AUTOMATED: CPT

## 2021-07-22 PROCEDURE — 80158 DRUG ASSAY CYCLOSPORINE: CPT

## 2021-07-22 PROCEDURE — 36415 COLL VENOUS BLD VENIPUNCTURE: CPT

## 2021-07-22 PROCEDURE — 82248 BILIRUBIN DIRECT: CPT

## 2021-07-27 ENCOUNTER — TELEPHONE (OUTPATIENT)
Dept: ORTHOPEDICS | Facility: CLINIC | Age: 61
End: 2021-07-27

## 2021-07-27 NOTE — TELEPHONE ENCOUNTER
Nicci called this RN to talk about scheduling a follow up appt with Dr. Wallace to discuss limitations she still has with her right knee.  Nicci would also like to discuss moving forward with surgery on her left knee.  This RN discussed that Dr. Wallace would want to physically assess her in clinic and obtain updated x-rays before he would make any recommendations. Nicci will also discuss a handicapped permit at this visit.    Nicci was in agreement with this plan. Appt scheduled and confirmed with Nicci for 8/26 with Dr. Wallace.    JIMBO LinaresN, RN  RN Care Coordinator, Dr. Wallace  [facility]

## 2021-07-28 DIAGNOSIS — T86.41 LIVER TRANSPLANT REJECTION (H): ICD-10-CM

## 2021-07-28 RX ORDER — MYCOPHENOLATE MOFETIL 250 MG/1
250 CAPSULE ORAL 2 TIMES DAILY
Qty: 60 CAPSULE | Refills: 3 | Status: SHIPPED | OUTPATIENT
Start: 2021-07-28 | End: 2021-08-12

## 2021-08-12 ENCOUNTER — TELEPHONE (OUTPATIENT)
Dept: TRANSPLANT | Facility: CLINIC | Age: 61
End: 2021-08-12

## 2021-08-12 ENCOUNTER — LAB (OUTPATIENT)
Dept: LAB | Facility: CLINIC | Age: 61
End: 2021-08-12
Payer: COMMERCIAL

## 2021-08-12 DIAGNOSIS — Z94.4 LIVER REPLACED BY TRANSPLANT (H): ICD-10-CM

## 2021-08-12 DIAGNOSIS — T86.41 LIVER TRANSPLANT REJECTION (H): ICD-10-CM

## 2021-08-12 DIAGNOSIS — Z94.4 STATUS POST LIVER TRANSPLANTATION (H): Chronic | ICD-10-CM

## 2021-08-12 LAB
ALBUMIN SERPL-MCNC: 3.7 G/DL (ref 3.4–5)
ALP SERPL-CCNC: 152 U/L (ref 40–150)
ALT SERPL W P-5'-P-CCNC: 50 U/L (ref 0–50)
ANION GAP SERPL CALCULATED.3IONS-SCNC: 4 MMOL/L (ref 3–14)
AST SERPL W P-5'-P-CCNC: 58 U/L (ref 0–45)
BILIRUB DIRECT SERPL-MCNC: 0.4 MG/DL (ref 0–0.2)
BILIRUB SERPL-MCNC: 1.8 MG/DL (ref 0.2–1.3)
BUN SERPL-MCNC: 28 MG/DL (ref 7–30)
CALCIUM SERPL-MCNC: 9.5 MG/DL (ref 8.5–10.1)
CHLORIDE BLD-SCNC: 107 MMOL/L (ref 94–109)
CO2 SERPL-SCNC: 28 MMOL/L (ref 20–32)
CREAT SERPL-MCNC: 1.21 MG/DL (ref 0.52–1.04)
CYCLOSPORINE BLD LC/MS/MS-MCNC: 109 UG/L (ref 50–400)
ERYTHROCYTE [DISTWIDTH] IN BLOOD BY AUTOMATED COUNT: 12 % (ref 10–15)
GFR SERPL CREATININE-BSD FRML MDRD: 48 ML/MIN/1.73M2
GLUCOSE BLD-MCNC: 85 MG/DL (ref 70–99)
HCT VFR BLD AUTO: 38.5 % (ref 35–47)
HGB BLD-MCNC: 13.4 G/DL (ref 11.7–15.7)
MCH RBC QN AUTO: 35.2 PG (ref 26.5–33)
MCHC RBC AUTO-ENTMCNC: 34.8 G/DL (ref 31.5–36.5)
MCV RBC AUTO: 101 FL (ref 78–100)
PLATELET # BLD AUTO: 135 10E3/UL (ref 150–450)
POTASSIUM BLD-SCNC: 4.2 MMOL/L (ref 3.4–5.3)
PROT SERPL-MCNC: 7.4 G/DL (ref 6.8–8.8)
RBC # BLD AUTO: 3.81 10E6/UL (ref 3.8–5.2)
SODIUM SERPL-SCNC: 139 MMOL/L (ref 133–144)
TME LAST DOSE: NORMAL H
TME LAST DOSE: NORMAL H
WBC # BLD AUTO: 5.4 10E3/UL (ref 4–11)

## 2021-08-12 PROCEDURE — 36415 COLL VENOUS BLD VENIPUNCTURE: CPT

## 2021-08-12 PROCEDURE — 85027 COMPLETE CBC AUTOMATED: CPT

## 2021-08-12 PROCEDURE — 80053 COMPREHEN METABOLIC PANEL: CPT

## 2021-08-12 PROCEDURE — 80158 DRUG ASSAY CYCLOSPORINE: CPT

## 2021-08-12 PROCEDURE — 82248 BILIRUBIN DIRECT: CPT

## 2021-08-12 RX ORDER — MYCOPHENOLATE MOFETIL 250 MG/1
500 CAPSULE ORAL 2 TIMES DAILY
Qty: 360 CAPSULE | Refills: 3 | Status: SHIPPED | OUTPATIENT
Start: 2021-08-12 | End: 2022-08-15

## 2021-08-12 NOTE — TELEPHONE ENCOUNTER
Pt LFTs slightly bumped. Discussed with Dr Leventhal. Pt to increase cellcept 500 mg BID. Pt's CSA goal 120-150. Await CSA level.    Pt's CSA level 109. Pt will increase to 75 mg BID.

## 2021-08-13 DIAGNOSIS — Z94.4 LIVER REPLACED BY TRANSPLANT (H): Primary | ICD-10-CM

## 2021-08-13 RX ORDER — CYCLOSPORINE 25 MG/1
CAPSULE, LIQUID FILLED ORAL
Qty: 540 CAPSULE | Refills: 3 | Status: SHIPPED | OUTPATIENT
Start: 2021-08-13 | End: 2021-08-20

## 2021-08-19 ENCOUNTER — LAB (OUTPATIENT)
Dept: LAB | Facility: CLINIC | Age: 61
End: 2021-08-19
Payer: COMMERCIAL

## 2021-08-19 DIAGNOSIS — T86.41 LIVER TRANSPLANT REJECTION (H): ICD-10-CM

## 2021-08-19 DIAGNOSIS — Z94.4 STATUS POST LIVER TRANSPLANTATION (H): ICD-10-CM

## 2021-08-19 DIAGNOSIS — Z94.4 LIVER REPLACED BY TRANSPLANT (H): ICD-10-CM

## 2021-08-19 DIAGNOSIS — Z94.4 LIVER REPLACED BY TRANSPLANT (H): Primary | ICD-10-CM

## 2021-08-19 DIAGNOSIS — Z94.4 S/P LIVER TRANSPLANT (H): ICD-10-CM

## 2021-08-19 LAB
ALBUMIN SERPL-MCNC: 3.7 G/DL (ref 3.4–5)
ALP SERPL-CCNC: 150 U/L (ref 40–150)
ALT SERPL W P-5'-P-CCNC: 40 U/L (ref 0–50)
ANION GAP SERPL CALCULATED.3IONS-SCNC: 5 MMOL/L (ref 3–14)
AST SERPL W P-5'-P-CCNC: 52 U/L (ref 0–45)
BILIRUB DIRECT SERPL-MCNC: 0.3 MG/DL (ref 0–0.2)
BILIRUB SERPL-MCNC: 1.6 MG/DL (ref 0.2–1.3)
BUN SERPL-MCNC: 24 MG/DL (ref 7–30)
CALCIUM SERPL-MCNC: 9.3 MG/DL (ref 8.5–10.1)
CHLORIDE BLD-SCNC: 107 MMOL/L (ref 94–109)
CO2 SERPL-SCNC: 28 MMOL/L (ref 20–32)
CREAT SERPL-MCNC: 1.18 MG/DL (ref 0.52–1.04)
ERYTHROCYTE [DISTWIDTH] IN BLOOD BY AUTOMATED COUNT: 11.9 % (ref 10–15)
GFR SERPL CREATININE-BSD FRML MDRD: 50 ML/MIN/1.73M2
GLUCOSE BLD-MCNC: 90 MG/DL (ref 70–99)
HCT VFR BLD AUTO: 40.4 % (ref 35–47)
HGB BLD-MCNC: 13.3 G/DL (ref 11.7–15.7)
MCH RBC QN AUTO: 33.8 PG (ref 26.5–33)
MCHC RBC AUTO-ENTMCNC: 32.9 G/DL (ref 31.5–36.5)
MCV RBC AUTO: 103 FL (ref 78–100)
PLATELET # BLD AUTO: 142 10E3/UL (ref 150–450)
POTASSIUM BLD-SCNC: 4.7 MMOL/L (ref 3.4–5.3)
PROT SERPL-MCNC: 7.5 G/DL (ref 6.8–8.8)
RBC # BLD AUTO: 3.94 10E6/UL (ref 3.8–5.2)
SODIUM SERPL-SCNC: 140 MMOL/L (ref 133–144)
TSH SERPL DL<=0.005 MIU/L-ACNC: 0.94 MU/L (ref 0.4–4)
WBC # BLD AUTO: 5.5 10E3/UL (ref 4–11)

## 2021-08-19 PROCEDURE — 82248 BILIRUBIN DIRECT: CPT

## 2021-08-19 PROCEDURE — 85027 COMPLETE CBC AUTOMATED: CPT

## 2021-08-19 PROCEDURE — 80053 COMPREHEN METABOLIC PANEL: CPT

## 2021-08-19 PROCEDURE — 36415 COLL VENOUS BLD VENIPUNCTURE: CPT

## 2021-08-19 PROCEDURE — 84443 ASSAY THYROID STIM HORMONE: CPT

## 2021-08-19 PROCEDURE — 80158 DRUG ASSAY CYCLOSPORINE: CPT

## 2021-08-20 ENCOUNTER — TELEPHONE (OUTPATIENT)
Dept: TRANSPLANT | Facility: CLINIC | Age: 61
End: 2021-08-20

## 2021-08-20 DIAGNOSIS — Z94.4 LIVER REPLACED BY TRANSPLANT (H): Primary | ICD-10-CM

## 2021-08-20 LAB
CYCLOSPORINE BLD LC/MS/MS-MCNC: 109 UG/L (ref 50–400)
TME LAST DOSE: NORMAL H
TME LAST DOSE: NORMAL H

## 2021-08-20 RX ORDER — CYCLOSPORINE 25 MG/1
CAPSULE, LIQUID FILLED ORAL
Qty: 720 CAPSULE | Refills: 3 | Status: SHIPPED | OUTPATIENT
Start: 2021-08-20 | End: 2021-10-15

## 2021-08-20 ASSESSMENT — ENCOUNTER SYMPTOMS
BACK PAIN: 1
TASTE DISTURBANCE: 0
SORE THROAT: 0
ARTHRALGIAS: 1
MYALGIAS: 0
STIFFNESS: 1
MUSCLE WEAKNESS: 1
NECK MASS: 0
HOARSE VOICE: 0
TROUBLE SWALLOWING: 0
SINUS PAIN: 1
NECK PAIN: 0
SMELL DISTURBANCE: 0
JOINT SWELLING: 1
MUSCLE CRAMPS: 0
SINUS CONGESTION: 1

## 2021-08-20 ASSESSMENT — KOOS JR: HOW SEVERE IS YOUR KNEE STIFFNESS AFTER FIRST WAKING IN MORNING: EXTREME

## 2021-08-20 ASSESSMENT — ACTIVITIES OF DAILY LIVING (ADL): FUNCTION,_DAILY_LIVING_SCORE: 10.29

## 2021-08-20 NOTE — TELEPHONE ENCOUNTER
ISSUE:   Cyclosporine level 109 on 8/19, goal 120, dose 75 mg BID.    PLAN:   Please call patient and confirm this was an accurate 12-hour trough. Verify Cyclosporine dose 75 mg BID. Confirm no new medications or illness. Confirm no missed doses. If accurate trough and accurate dose, increase Cyclosporine dose to 100 mg BID and repeat labs in 1week hepatic and CSA level.    OUTCOME:   Spoke with patient, they confirm accurate trough level and current dose 75 mg BID. Patient confirmed dose change to 100  mg BID and to repeat labs in 1 week. Orders sent to preferred pharmacy for dose change and lab for repeat labs. Patient voiced understanding of plan.

## 2021-08-25 DIAGNOSIS — M25.562 LEFT KNEE PAIN: ICD-10-CM

## 2021-08-25 DIAGNOSIS — Z96.651 STATUS POST RIGHT KNEE REPLACEMENT: Primary | ICD-10-CM

## 2021-08-26 ENCOUNTER — ANCILLARY PROCEDURE (OUTPATIENT)
Dept: GENERAL RADIOLOGY | Facility: CLINIC | Age: 61
End: 2021-08-26
Attending: ORTHOPAEDIC SURGERY
Payer: COMMERCIAL

## 2021-08-26 ENCOUNTER — LAB (OUTPATIENT)
Dept: LAB | Facility: CLINIC | Age: 61
End: 2021-08-26
Payer: COMMERCIAL

## 2021-08-26 ENCOUNTER — ANCILLARY PROCEDURE (OUTPATIENT)
Dept: MAMMOGRAPHY | Facility: CLINIC | Age: 61
End: 2021-08-26
Payer: COMMERCIAL

## 2021-08-26 ENCOUNTER — PREP FOR PROCEDURE (OUTPATIENT)
Dept: ORTHOPEDICS | Facility: CLINIC | Age: 61
End: 2021-08-26

## 2021-08-26 ENCOUNTER — OFFICE VISIT (OUTPATIENT)
Dept: ORTHOPEDICS | Facility: CLINIC | Age: 61
End: 2021-08-26
Payer: COMMERCIAL

## 2021-08-26 DIAGNOSIS — M25.562 LEFT KNEE PAIN: Primary | ICD-10-CM

## 2021-08-26 DIAGNOSIS — R79.89 ELEVATED LFTS: ICD-10-CM

## 2021-08-26 DIAGNOSIS — Z94.4 LIVER REPLACED BY TRANSPLANT (H): ICD-10-CM

## 2021-08-26 DIAGNOSIS — Z96.651 STATUS POST RIGHT KNEE REPLACEMENT: ICD-10-CM

## 2021-08-26 DIAGNOSIS — Z94.4 S/P LIVER TRANSPLANT (H): Primary | ICD-10-CM

## 2021-08-26 DIAGNOSIS — Z12.31 VISIT FOR SCREENING MAMMOGRAM: ICD-10-CM

## 2021-08-26 DIAGNOSIS — Z94.4 LIVER REPLACED BY TRANSPLANT (H): Primary | ICD-10-CM

## 2021-08-26 LAB
ALBUMIN SERPL-MCNC: 3.6 G/DL (ref 3.4–5)
ALP SERPL-CCNC: 153 U/L (ref 40–150)
ALT SERPL W P-5'-P-CCNC: 42 U/L (ref 0–50)
ANION GAP SERPL CALCULATED.3IONS-SCNC: 9 MMOL/L (ref 3–14)
AST SERPL W P-5'-P-CCNC: 49 U/L (ref 0–45)
BILIRUB DIRECT SERPL-MCNC: 0.4 MG/DL (ref 0–0.2)
BILIRUB SERPL-MCNC: 1.9 MG/DL (ref 0.2–1.3)
BUN SERPL-MCNC: 22 MG/DL (ref 7–30)
CALCIUM SERPL-MCNC: 9.7 MG/DL (ref 8.5–10.1)
CHLORIDE BLD-SCNC: 107 MMOL/L (ref 94–109)
CO2 SERPL-SCNC: 26 MMOL/L (ref 20–32)
CREAT SERPL-MCNC: 1.05 MG/DL (ref 0.52–1.04)
CYCLOSPORINE BLD LC/MS/MS-MCNC: 133 UG/L (ref 50–400)
ERYTHROCYTE [DISTWIDTH] IN BLOOD BY AUTOMATED COUNT: 11.9 % (ref 10–15)
GFR SERPL CREATININE-BSD FRML MDRD: 57 ML/MIN/1.73M2
GLUCOSE BLD-MCNC: 92 MG/DL (ref 70–99)
HCT VFR BLD AUTO: 38.9 % (ref 35–47)
HGB BLD-MCNC: 13.4 G/DL (ref 11.7–15.7)
MCH RBC QN AUTO: 33.6 PG (ref 26.5–33)
MCHC RBC AUTO-ENTMCNC: 34.4 G/DL (ref 31.5–36.5)
MCV RBC AUTO: 98 FL (ref 78–100)
PLATELET # BLD AUTO: 134 10E3/UL (ref 150–450)
POTASSIUM BLD-SCNC: 4.5 MMOL/L (ref 3.4–5.3)
PROT SERPL-MCNC: 7.3 G/DL (ref 6.8–8.8)
RBC # BLD AUTO: 3.99 10E6/UL (ref 3.8–5.2)
SODIUM SERPL-SCNC: 142 MMOL/L (ref 133–144)
TME LAST DOSE: NORMAL H
TME LAST DOSE: NORMAL H
WBC # BLD AUTO: 5.8 10E3/UL (ref 4–11)

## 2021-08-26 PROCEDURE — 73560 X-RAY EXAM OF KNEE 1 OR 2: CPT | Mod: RT | Performed by: RADIOLOGY

## 2021-08-26 PROCEDURE — 77067 SCR MAMMO BI INCL CAD: CPT | Performed by: RADIOLOGY

## 2021-08-26 PROCEDURE — 82248 BILIRUBIN DIRECT: CPT | Performed by: PATHOLOGY

## 2021-08-26 PROCEDURE — 77063 BREAST TOMOSYNTHESIS BI: CPT | Performed by: RADIOLOGY

## 2021-08-26 PROCEDURE — 99214 OFFICE O/P EST MOD 30 MIN: CPT | Mod: 57 | Performed by: ORTHOPAEDIC SURGERY

## 2021-08-26 PROCEDURE — 80053 COMPREHEN METABOLIC PANEL: CPT | Performed by: PATHOLOGY

## 2021-08-26 PROCEDURE — 80158 DRUG ASSAY CYCLOSPORINE: CPT | Mod: 90 | Performed by: PATHOLOGY

## 2021-08-26 PROCEDURE — 36415 COLL VENOUS BLD VENIPUNCTURE: CPT | Performed by: PATHOLOGY

## 2021-08-26 PROCEDURE — 85027 COMPLETE CBC AUTOMATED: CPT | Performed by: PATHOLOGY

## 2021-08-26 NOTE — LETTER
8/26/2021         RE: Nicci Pemberton  [address] [place] [address]        Dear Colleague,    Thank you for referring your patient, Nicci Pemberton, to the [facility] [place]. Please see a copy of my visit note below.    Diagnosis:   1. Degenerative joint disease bilateral knees  2. Status post liver transplantation    Surgery: 10/23/2020 right total knee replacement    I saw Nicci today and she is doing quite well after right total knee arthroplasty surgery in October 2020.  She has some residual pain but it is nowhere near what she had prior to her surgery.     She had episode of rejection recently and had to increase her steroids.  These have since been tapered.     She has marked pain on her contralateral left knee however and would like to proceed with left knee arthroplasty surgery.    Examination is notable for 0 to 100 degrees motion of the right knee joint.  No effusion.  No increased warmth.  Incision is healed satisfactorily.  Ligaments are stable.    Left knee examination demonstrates crepitance medially, slight varus deformity.    Doppler pulses not palpable.    Radiographs demonstrate severe end-stage degenerative disease of the left knee joint.  The right knee implant in satisfactory position.        Impression and plan    Patient would like to proceed with left total knee arthroplasty surgery and I think this is reasonable.  We will make the appropriate arrangements.  Postoperatively we will use the same regimen as her prior knee surgery in terms of aspirin for DVT prophylaxis for 1 month duration.    Mario Wallace MD  [place] [facility] Professor  Oncology and Adult Reconstructive Surgery  Dept Orthopaedic Surgery, [facility]  [phone] office, [phone] pager  [email or web address]      Total combined visit time and work time before and after clinic visit = 20 min    Answers for HPI/ROS submitted by the patient on 8/20/2021  General Symptoms: No  Skin  Symptoms: No  HENT Symptoms: Yes  EYE SYMPTOMS: No  HEART SYMPTOMS: No  LUNG SYMPTOMS: No  INTESTINAL SYMPTOMS: No  URINARY SYMPTOMS: No  GYNECOLOGIC SYMPTOMS: No  BREAST SYMPTOMS: No  SKELETAL SYMPTOMS: Yes  BLOOD SYMPTOMS: No  NERVOUS SYSTEM SYMPTOMS: No  MENTAL HEALTH SYMPTOMS: No  Ear pain: No  Ear discharge: No  Hearing loss: No  Tinnitus: No  Nosebleeds: No  Congestion: Yes  Sinus pain: Yes  Trouble swallowing: No   Voice hoarseness: No  Mouth sores: No  Sore throat: No  Tooth pain: No  Gum tenderness: No  Bleeding gums: No  Change in taste: No  Change in sense of smell: No  Dry mouth: No  Hearing aid used: No  Neck lump: No  Back pain: Yes  Muscle aches: No  Neck pain: No  Swollen joints: Yes  Joint pain: Yes  Bone pain: Yes  Muscle cramps: No  Muscle weakness: Yes  Joint stiffness: Yes  Bone fracture: No

## 2021-08-26 NOTE — NURSING NOTE
Nicci had dental cleaning in June.Surgical date of 9/24 confirmed. PAC scheduled, patient will also schedule preop with local MD at [facility].   Hugo will be her . Nicci is fully vaccinated for COVID. Nicci denies cardiac or resp history. No diabetes. No clotting/bleeding disorders.  Hx of Liver Transplant, has been treated for low plt ct.  Nicci is aware of what to expect, hx of right total knee arthroplasty.  Doppler palpable PT and DP pulses in left foot.    Teaching Flowsheet   Relevant Diagnosis:left total knee arthroplasty surgery  Teaching Topic: Preop Teaching for above   Person(s) involved in teaching:   Patient     Motivation Level:  Asks Questions: Yes  Eager to Learn: Yes  Cooperative: Yes  Receptive (willing/able to accept information): Yes  Any cultural factors/Orthodox beliefs that may influence understanding or compliance? No       Patient demonstrates understanding of the following:  Reason for the appointment, diagnosis and treatment plan: Yes  Knowledge of proper use of medications and conditions for which they are ordered (with special attention to potential side effects or drug interactions): Yes  Which situations necessitate calling provider and whom to contact: Yes       Teaching Concerns Addressed: see above  Total Joint Surgery:  Identified:   Hugo               Outpatient procedure explained:yes  No dental procedures 6 months prior to surgery and no dental procedures 6 months after surgery explained:       Nutritional needs and diet plan: Yes  Pain management techniques: Yes       Instructional Materials Used/Given: Preop Packet and Antiseptic Soap  Total Joint Book, Get Well Loop Flyer, Joint Class Information Sheet, Prophylactic Antibiotic Reminder Card,     Time spent with patient: 30 minutes.    JIMBO LinaresN, RN  RN Care Coordinator, Dr. Rodrigo [facility]

## 2021-08-27 NOTE — TELEPHONE ENCOUNTER
FUTURE VISIT INFORMATION      SURGERY INFORMATION:    Dr. Wallace - Right Total Knee    Consult: ov 21    RECORDS REQUESTED FROM:       Primary Care Provider: Wale Thurston MD- [facility]    Pertinent Medical History: hypertension    Most recent EKG+ Tracin20    Most recent ECHO: 10/4/17- [facility]    Most recent Cardiac Stress Test: 19

## 2021-08-27 NOTE — TELEPHONE ENCOUNTER
August 27, 2021 8:26 AM -  AIVERSE1: pt called back after justin matt for her to [facility] . tue am /confirmed

## 2021-08-31 ENCOUNTER — ANCILLARY PROCEDURE (OUTPATIENT)
Dept: ULTRASOUND IMAGING | Facility: CLINIC | Age: 61
End: 2021-08-31
Attending: INTERNAL MEDICINE
Payer: COMMERCIAL

## 2021-08-31 DIAGNOSIS — Z11.59 ENCOUNTER FOR SCREENING FOR OTHER VIRAL DISEASES: ICD-10-CM

## 2021-08-31 DIAGNOSIS — R79.89 ELEVATED LFTS: ICD-10-CM

## 2021-08-31 PROCEDURE — 93975 VASCULAR STUDY: CPT | Mod: GC | Performed by: RADIOLOGY

## 2021-08-31 RX ORDER — CEFAZOLIN SODIUM 2 G/100ML
2 INJECTION, SOLUTION INTRAVENOUS
Status: CANCELLED | OUTPATIENT
Start: 2021-08-31

## 2021-08-31 RX ORDER — ACETAMINOPHEN 325 MG/1
975 TABLET ORAL ONCE
Status: CANCELLED | OUTPATIENT
Start: 2021-08-31 | End: 2021-08-31

## 2021-08-31 RX ORDER — CEFAZOLIN SODIUM 2 G/100ML
2 INJECTION, SOLUTION INTRAVENOUS SEE ADMIN INSTRUCTIONS
Status: CANCELLED | OUTPATIENT
Start: 2021-08-31

## 2021-08-31 RX ORDER — TRANEXAMIC ACID 650 MG/1
1950 TABLET ORAL ONCE
Status: CANCELLED | OUTPATIENT
Start: 2021-08-31 | End: 2021-08-31

## 2021-09-08 ENCOUNTER — TELEPHONE (OUTPATIENT)
Dept: TRANSPLANT | Facility: CLINIC | Age: 61
End: 2021-09-08

## 2021-09-08 NOTE — PROGRESS NOTES
"SURGERY PLAN (PRE-OP PLAN)    BRIEF:  ANCEF// Supine// LEFT TKA// Cocktail     Diagnosis:   1. Degenerative joint disease bilateral knees  2. Status post liver transplantation     Surgery: 10/23/2020 right total knee replacement     Per last note: \"I saw Nicci today and she is doing quite well after right total knee arthroplasty surgery in October 2020.  She has some residual pain but it is nowhere near what she had prior to her surgery.      She had episode of rejection recently and had to increase her steroids.  These have since been tapered.      She has marked pain on her contralateral left knee however and would like to proceed with left knee arthroplasty surgery.\"    Patient Position (indicated by x):  X  Supine with torso rolled up on a bump   x  Regular OR table     Forrester catheter   X  Blue U drape   Blue and White stockinet   Extremity drape   Coban   Ioban             TKA Requests (indicated by x):   X  Biomet Vanguard TKA (+/- PCL retention)      Biomet SSK revision TKA + stems      Specimens and cultures (indicated by x):      Tissue cultures, aerobic and anaerobic without gram stain      Frozen section      pathology specimens - fresh      pathology specimens - formalin      Plan  Primary LEFT TKA with BIOMET      Right Side:     71mm Tibia    67.5 CR Femur    31x8 Patella    12x71 Insert AS    Forrest Brown DO  Adult Joint Reconstruction Fellow  Dept Orthopaedic Surgery, [facility]  "

## 2021-09-08 NOTE — TELEPHONE ENCOUNTER
Patient Call: General  Route to LPN    Reason for call: touching base with coordinator regarding a few questions please connect     Call back needed? Yes    Return Call Needed  Same as documented in contacts section  When to return call?: Greater than one day: Route standard priority

## 2021-09-09 DIAGNOSIS — G89.29 CHRONIC PAIN OF LEFT KNEE: Primary | ICD-10-CM

## 2021-09-09 DIAGNOSIS — M25.562 CHRONIC PAIN OF LEFT KNEE: Primary | ICD-10-CM

## 2021-09-09 NOTE — PROGRESS NOTES
Nicci called this RN to discuss needing PT orders prior to scheduling.  Nicci discussed COVID testing being done locally.  Nicci discussed her preop.  Nicci had questions about her anti-rejection meds, will discuss with PAC team.    JIMBO LinaresN, RN  RN Care Coordinator, Dr. Rordigo [facility]

## 2021-09-10 ENCOUNTER — LAB (OUTPATIENT)
Dept: LAB | Facility: CLINIC | Age: 61
End: 2021-09-10
Payer: COMMERCIAL

## 2021-09-10 DIAGNOSIS — Z94.4 LIVER REPLACED BY TRANSPLANT (H): ICD-10-CM

## 2021-09-10 LAB
ALBUMIN SERPL-MCNC: 3.5 G/DL (ref 3.4–5)
ALP SERPL-CCNC: 147 U/L (ref 40–150)
ALT SERPL W P-5'-P-CCNC: 27 U/L (ref 0–50)
ANION GAP SERPL CALCULATED.3IONS-SCNC: 4 MMOL/L (ref 3–14)
AST SERPL W P-5'-P-CCNC: 36 U/L (ref 0–45)
BILIRUB DIRECT SERPL-MCNC: 0.4 MG/DL (ref 0–0.2)
BILIRUB SERPL-MCNC: 1.8 MG/DL (ref 0.2–1.3)
BUN SERPL-MCNC: 20 MG/DL (ref 7–30)
CALCIUM SERPL-MCNC: 8.4 MG/DL (ref 8.5–10.1)
CHLORIDE BLD-SCNC: 109 MMOL/L (ref 94–109)
CO2 SERPL-SCNC: 28 MMOL/L (ref 20–32)
CREAT SERPL-MCNC: 1.11 MG/DL (ref 0.52–1.04)
CYCLOSPORINE BLD LC/MS/MS-MCNC: 156 UG/L (ref 50–400)
ERYTHROCYTE [DISTWIDTH] IN BLOOD BY AUTOMATED COUNT: 12.1 % (ref 10–15)
GFR SERPL CREATININE-BSD FRML MDRD: 54 ML/MIN/1.73M2
GLUCOSE BLD-MCNC: 88 MG/DL (ref 70–99)
HCT VFR BLD AUTO: 38.4 % (ref 35–47)
HGB BLD-MCNC: 13.1 G/DL (ref 11.7–15.7)
MCH RBC QN AUTO: 34 PG (ref 26.5–33)
MCHC RBC AUTO-ENTMCNC: 34.1 G/DL (ref 31.5–36.5)
MCV RBC AUTO: 100 FL (ref 78–100)
PLATELET # BLD AUTO: 128 10E3/UL (ref 150–450)
POTASSIUM BLD-SCNC: 4.3 MMOL/L (ref 3.4–5.3)
PROT SERPL-MCNC: 7 G/DL (ref 6.8–8.8)
RBC # BLD AUTO: 3.85 10E6/UL (ref 3.8–5.2)
SODIUM SERPL-SCNC: 141 MMOL/L (ref 133–144)
TME LAST DOSE: NORMAL H
TME LAST DOSE: NORMAL H
WBC # BLD AUTO: 5.3 10E3/UL (ref 4–11)

## 2021-09-10 PROCEDURE — 82248 BILIRUBIN DIRECT: CPT

## 2021-09-10 PROCEDURE — 80158 DRUG ASSAY CYCLOSPORINE: CPT

## 2021-09-10 PROCEDURE — 36415 COLL VENOUS BLD VENIPUNCTURE: CPT

## 2021-09-10 PROCEDURE — 85027 COMPLETE CBC AUTOMATED: CPT

## 2021-09-10 PROCEDURE — 80053 COMPREHEN METABOLIC PANEL: CPT

## 2021-09-10 NOTE — TELEPHONE ENCOUNTER
Patient Call: General  Route to LPN    Reason for call: Patient is returning a call    Call back needed? Yes, if unavailable at cell [phone] please call [phone]

## 2021-09-10 NOTE — TELEPHONE ENCOUNTER
Spoke with patient.    She feels gained 30 pounds from lack of exercise due to knee pain. Pt is setup for knee replacement on 9/24.

## 2021-09-14 ENCOUNTER — TRANSFERRED RECORDS (OUTPATIENT)
Dept: HEALTH INFORMATION MANAGEMENT | Facility: CLINIC | Age: 61
End: 2021-09-14

## 2021-09-15 ENCOUNTER — ANESTHESIA EVENT (OUTPATIENT)
Dept: SURGERY | Facility: CLINIC | Age: 61
End: 2021-09-15

## 2021-09-15 ENCOUNTER — PRE VISIT (OUTPATIENT)
Dept: SURGERY | Facility: CLINIC | Age: 61
End: 2021-09-15

## 2021-09-15 ENCOUNTER — VIRTUAL VISIT (OUTPATIENT)
Dept: SURGERY | Facility: CLINIC | Age: 61
End: 2021-09-15
Payer: COMMERCIAL

## 2021-09-15 DIAGNOSIS — Z01.818 PRE-OP EXAMINATION: Primary | ICD-10-CM

## 2021-09-15 DIAGNOSIS — N18.30 STAGE 3 CHRONIC KIDNEY DISEASE, UNSPECIFIED WHETHER STAGE 3A OR 3B CKD (H): ICD-10-CM

## 2021-09-15 PROCEDURE — 99204 OFFICE O/P NEW MOD 45 MIN: CPT | Mod: 95 | Performed by: NURSE PRACTITIONER

## 2021-09-15 RX ORDER — LIDOCAINE 40 MG/G
CREAM TOPICAL
Status: CANCELLED | OUTPATIENT
Start: 2021-09-15

## 2021-09-15 RX ORDER — ACETAMINOPHEN 325 MG/1
325-650 TABLET ORAL EVERY 6 HOURS PRN
Status: ON HOLD | COMMUNITY
End: 2021-09-25

## 2021-09-15 RX ORDER — SODIUM CHLORIDE, SODIUM LACTATE, POTASSIUM CHLORIDE, CALCIUM CHLORIDE 600; 310; 30; 20 MG/100ML; MG/100ML; MG/100ML; MG/100ML
INJECTION, SOLUTION INTRAVENOUS CONTINUOUS
Status: CANCELLED | OUTPATIENT
Start: 2021-09-15

## 2021-09-15 ASSESSMENT — PAIN SCALES - GENERAL: PAINLEVEL: SEVERE PAIN (7)

## 2021-09-15 ASSESSMENT — LIFESTYLE VARIABLES: TOBACCO_USE: 1

## 2021-09-15 NOTE — PATIENT INSTRUCTIONS
Preparing for Your Surgery      Name:  Nicci Pemberton   MRN:  [id number]   :  1960   Today's Date:  9/15/2021       Arriving for surgery:  Surgery date:  2021  Arrival time:  7:00 am    Restrictions due to COVID 19:       One visitor is allowed in the Pre Op area. When you go into surgery, one visitor is allowed to wait in the Surgery Waiting Room       (provided there is enough space to social distance).   After surgery- Two visitors are allowed at a time if you have a private room and one visitor is allowed for those in a semi-private room.   Every 4 days the visitor(s) can rotate. During the 4 day period, the visitor(s) must be consistent. No visitors under the age of 18 years old.   Visiting Hours: 8 am - 8:30 pm   No ill visitors.   All visitors must wear face mask.    [facility] parking is available for anyone with mobility limitations or disabilities.  ([place]  24 hours/ 7 days a week; [facility] [place]  7 am- 3:30 pm, Mon- Fri)    Please come to:     [facility] [address]  [facility]   [address] [place]  [address]    -Parking is available in the Green Ramp     -Proceed to the 3rd floor, check in at the Adult Surgery Waiting Lounge. [phone]    If an escort is needed stop at the Information Desk in the lobby.         What can I eat or drink?  -  You may eat and drink normally for up to 8 hours before your surgery. (Until midnight )  -  You may have clear liquids until 2 hours before surgery. (Until 7 am arrival time)    Examples of clear liquids:  Water  Clear broth  Juices (apple, white grape, white cranberry  and cider) without pulp  Noncarbonated, powder based beverages  (lemonade and Miky-Aid)  Sodas (Sprite, 7-Up, ginger ale and seltzer)  Coffee or tea (without milk or cream)  Gatorade    -  No Alcohol for at least 24 hours before surgery     Which medicines can I take?    Hold Aspirin for 7 days before  surgery.   Hold Multivitamins for 7 days before surgery.  Hold Supplements for 7 days before surgery.  Hold Ibuprofen (Advil, Motrin) for 1 day before surgery--unless otherwise directed by surgeon.  Hold Naproxen (Aleve) for 4 days before surgery.    -  DO NOT take these medications the day of surgery:  Vitamin D      -  PLEASE TAKE these medications the day of surgery:  Cyclosporine  Famotidine  Levothyroxine   Mycophenolate       How do I prepare myself?  - Please take 2 showers before surgery using Scrubcare or Hibiclens soap.    Use this soap only from the neck to your toes.     Leave the soap on your skin for one minute--then rinse thoroughly.      You may use your own shampoo and conditioner; no other hair products.   - Please remove all jewelry and body piercings.  - No lotions, deodorants or fragrance.  - No makeup or fingernail polish.   - Bring your ID and insurance card.    -If you have a Deep Brain Stimulator, Spinal Cord Stimulator or any neuro stimulator device---you must bring the remote control to the hospital     - All patients are required to have a Covid-19 test within 4 days of surgery/procedure.      -Patients will be contacted by the [facility] scheduling team within 1 week of surgery to make an appointment.      - Patients may call the Scheduling team at [phone] if they have not been scheduled within 4 days of  surgery.      ALL PATIENTS GOING HOME THE SAME DAY OF SURGERY ARE REQUIRED TO HAVE A RESPONSIBLE ADULT TO DRIVE AND BE IN ATTENDANCE WITH THEM FOR 24 HOURS FOLLOWING SURGERY.      Questions or Concerns:    - For any questions regarding the day of surgery or your hospital stay, please contact the Pre Admission Nursing Office at [phone].       - If you have health changes between today and your surgery please call your surgeon.       For questions after surgery please call your surgeons office.             OPTIMAL RECOVERY AFTER  TOTAL HIP AND KNEE REPLACEMENTS         Begin hydrating yourself by drinking at least 8-10 glasses of clear liquids for 24 hours before surgery:      Suggested clear liquids:   Water    Clear Juices   Clear Broth   Non- carbonated beverages    (Crystal Light or Miky Aid)   Sodas    (Sprite, 7 up, ginger ale, seltzer)   Gatorade              Drink clear liquids up until 2 hours before your surgery.       We would like you to purchase a drink such as Gatorade or Ensure Clear (not the milkshake type).  Drink this before bedtime and on the way into the hospital, drink between 8-10 ounces or until you feel hydrated.        Keeping well hydrated leads to your veins being plump, you wake up faster, and you are less likely to be nauseated. Start drinking water as soon as you can after surgery and advance to clear liquids and food as tolerated.  IV fluids contain salt, drinking fluids will minimize the amount of IV fluids you need and decrease the amount of salt you get.                 The most common reason for the patient to be readmitted is dehydration. Staying hydrated after you go home from the hospital is very important.  Ensure or Ensure Clear are good options to keep you hydrated.

## 2021-09-15 NOTE — H&P (VIEW-ONLY)
"  Pre-Operative H & P     CC:  Preoperative exam to assess for increased cardiopulmonary risk while undergoing surgery and anesthesia.    Date of Encounter: 9/15/2021  Primary Care Physician:  Wale Thurston Niecy is a 61 year old female who presents for pre-operative H & P in preparation for  Left total knee arthroplasty - Left with Mario Wallace MD on 9/24/2021 at [facility] under general anesthesia.       Ms. 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   Ms. Pemberton was seen by Dr. Wallace on 8/26/2021 for her degenerative joint disease of her knees b/l. She is status post right total knee arthroplasty with Dr. Wallace on 10/23/2020.  She has done well with her right knee replacement and endorsed marked pain on her contralateral left knee. She was interested in surgical intervention for her left knee.  Dr. Wallace counseled her on the above surgery.  Per Dr. Wallace's note,  \"Postoperatively we will use the same regimen as her prior knee surgery in terms of aspirin for DVT prophylaxis for 1 month duration\".  Ms. Pemberton presents to the PAC virtually today in preparation for the above surgery.      Diagnosis     Left knee pain     History is obtained from the patient and electronic health record.     Past Medical History  Past Medical History:   Diagnosis Date     Anemia      Cellulitis      Cirrhosis of liver (H) 06/18/2018     Gastroesophageal reflux disease      Heterozygous alpha 1-antitrypsin deficiency (H)      High hepatic iron concentration determined by biopsy of liver      Liver cirrhosis secondary to ANGULO (H)      Liver transplanted (H) 08/18/2019     Lymphedema      Osteoarthritis  "    knees     SBP (spontaneous bacterial peritonitis) (H) 08/02/2019 8/1/19 in [facility]     Thrombocytopenia (H) 11/2020     Thyroid disease     Hypothyroid       Past Surgical History  Past Surgical History:   Procedure Laterality Date     ARTHROPLASTY KNEE Right 10/23/2020    Procedure: Right  total knee arthroplasty;  Surgeon: Mario Wallace MD;  Location: [facility] OR     BENCH LIVER N/A 8/18/2019    Procedure: BACKBENCH PREPARATION, LIVER;  Surgeon: Mandeep lAford MD;  Location: [facility] OR     COLONOSCOPY N/A 6/22/2018    Procedure: COLONOSCOPY;  colonoscopy;  Surgeon: Hugo Patel MD;  [facility]     ENDOSCOPIC RETROGRADE CHOLANGIOPANCREATOGRAM N/A 2/10/2020    Procedure: ENDOSCOPIC RETROGRADE CHOLANGIOPANCREATOGRAPHY with spinchterotomy;  Surgeon: Guru Rigoberto Canada MD;  [facility]     ENDOSCOPIC RETROGRADE CHOLANGIOPANCREATOGRAM N/A 5/13/2020    Procedure: ENDOSCOPIC RETROGRADE CHOLANGIOPANCREATOGRAPHY,Stent exchange and sludge removal;  Surgeon: Guru Rigoberto Canada MD;  [facility]     ENDOSCOPIC RETROGRADE CHOLANGIOPANCREATOGRAM N/A 2/24/2021    Procedure: ENDOSCOPIC RETROGRADE CHOLANGIOPANCREATOGRAPHY, SPINCTEROTOMY, DEBRIDE REMOVAL, STENT PLACEMENT;  Surgeon: Guru Rigoberto Canada MD;  [facility]     ENDOSCOPIC RETROGRADE CHOLANGIOPANCREATOGRAPHY, EXCHANGE TUBE/STENT N/A 3/4/2020    Procedure: ENDOSCOPIC RETROGRADE CHOLANGIOPANCREATOGRAPHY, WITH biliary dilarion, stent exchange, stone removal;  Surgeon: Guru Rigoberto Canada MD;  [facility]     ENDOSCOPIC RETROGRADE CHOLANGIOPANCREATOGRAPHY, EXCHANGE TUBE/STENT N/A 5/12/2021    Procedure: ENDOSCOPIC RETROGRADE CHOLANGIOPANCREATOGRAPHY WITH BILIARY STENT EXCHANGE, DILATION AND SLUDGE/STONE REMOVAL;  Surgeon: Guru Rigoberto Canada MD;  [facility]     ESOPHAGOSCOPY, GASTROSCOPY, DUODENOSCOPY (EGD), COMBINED N/A 10/31/2019    Procedure:  Esophagogastroduodenoscopy, With Biopsy;  Surgeon: Nerissa Aranda MD;  [facility]: [facility]     IR FEEDING TUBE PLACEMENT W MOHAN/MD  2019     IR FLUORO 0-1 HOUR  2019     IR LIVER BIOPSY PERCUTANEOUS  3/25/2021     IR LUMBAR PUNCTURE  2019     KNEE SURGERY  10/23/20     TRANSPLANT LIVER RECIPIENT  DONOR N/A 2019    Procedure: TRANSPLANT, LIVER, RECIPIENT,  DONOR;  Surgeon: Mandeep Alford MD;  [facility]     US PARACENTESIS  2019     US PARACENTESIS WITH ALBUMIN  2019       Hx of Blood transfusions/reactions: yes without reaction      Hx of abnormal bleeding or anti-platelet use: denies     Menstrual history: No LMP recorded. Patient is postmenopausal.:     Steroid use in the last year: yes     Personal or FH with difficulty with Anesthesia:  See below  Prior to Admission Medications  Current Outpatient Medications   Medication Sig Dispense Refill     acetaminophen (TYLENOL) 325 MG tablet Take 325-650 mg by mouth every 6 hours as needed for mild pain       Cholecalciferol (VITAMIN D-3) 25 MCG (1000 UT) CAPS Take 1,000 Units by mouth 2 times daily 60 capsule 1     cycloSPORINE modified (GENERIC EQUIVALENT) 25 MG capsule Take 4 capsules (100 mg) by mouth every morning AND 4 capsules (100 mg) every evening. 720 capsule 3     famotidine (PEPCID) 20 MG tablet Take 1 tablet (20 mg) by mouth 2 times daily (Patient taking differently: Take 20 mg by mouth 2 times daily Daily only) 60 tablet 3     levothyroxine (SYNTHROID/LEVOTHROID) 125 MCG tablet Take 125 mcg by mouth every morning        mycophenolate (GENERIC EQUIVALENT) 250 MG capsule Take 2 capsules (500 mg) by mouth 2 times daily 360 capsule 3     COMPRESSION STOCKINGS 2 each every 12 hours 2 Product 1       Allergies  Allergies   Allergen Reactions     Ciprofloxacin Dizziness and Nausea     Food      Fruits with cores and pits  Apples     Sulfa Drugs Other (See Comments)     Swollen joints     Furosemide  Rash       Social History  Social History     Socioeconomic History     Marital status:      Spouse name: Not on file     Number of children: Not on file     Years of education: Not on file     Highest education level: Not on file   Occupational History     Not on file   Tobacco Use     Smoking status: Former Smoker     Packs/day: 0.50     Years: 10.00     Pack years: 5.00     Types: Cigarettes     Start date: 2000     Quit date:      Years since quittin.9     Smokeless tobacco: Never Used   Substance and Sexual Activity     Alcohol use: No     Comment: due to liver transplant,      Drug use: Never     Sexual activity: Not Currently     Partners: Male     Birth control/protection: Post-menopausal   Other Topics Concern     Parent/sibling w/ CABG, MI or angioplasty before 65F 55M? Not Asked   Social History Narrative     Not on file     Social Determinants of Health     Financial Resource Strain:      Difficulty of Paying Living Expenses:    Food Insecurity:      Worried About Running Out of Food in the Last Year:      Ran Out of Food in the Last Year:    Transportation Needs:      Lack of Transportation (Medical):      Lack of Transportation (Non-Medical):    Physical Activity:      Days of Exercise per Week:      Minutes of Exercise per Session:    Stress:      Feeling of Stress :    Social Connections:      Frequency of Communication with Friends and Family:      Frequency of Social Gatherings with Friends and Family:      Attends Sikhism Services:      Active Member of Clubs or Organizations:      Attends Club or Organization Meetings:      Marital Status:    Intimate Partner Violence:      Fear of Current or Ex-Partner:      Emotionally Abused:      Physically Abused:      Sexually Abused:        Family History  Family History   Problem Relation Age of Onset     Diabetes Maternal Grandfather         Diagnosed at age 83     Cirrhosis No family hx of      Liver Cancer No family hx of      No  Known Problems Mother      Restless Leg Syndrome Father      Mental Illness Sister      Alcoholism Brother      No Known Problems Son      Mental Illness Maternal Grandfather      Heart Failure Paternal Grandmother      No Known Problems Son      No Known Problems Maternal Grandmother      Breast Cancer Paternal Aunt        Anesthesia Evaluation   Pt has had prior anesthetic. Type: General and MAC.    History of anesthetic complications   Slow to wake..    ROS/MED HX  ENT/Pulmonary:     (+) DIXIE risk factors, obese, tobacco use (Quit 10 years ago.  Social smoker (weekends).), Past use,     Neurologic:  - neg neurologic ROS     Cardiovascular: Comment: Denies cardiac symptoms including chest pain, SOB, palpitations, syncope, CAMARA, orthopnea, or PND.    Dobutamine stress echocardiogram test negative for ischemia on 2019.    (+) -----Previous cardiac testing   Echo: Date: Results:    Stress Test: Date: 2019 Results:    ECG Reviewed: Date: Results:    Cath: Date: Results:   (-) taking anticoagulants/antiplateletsHypertension: Denies a h/o HTN.   METS/Exercise Tolerance:  Comment: METS<4 secondary to knee pain.  Able to walk mailbox daily (one block) slow and steady due to left knee pain.    Hematologic: Comments: H/o thromobocytopenia    (+) history of blood transfusion, no previous transfusion reaction, Known PRBC Anitbodies:No  (-) history of blood clots   Musculoskeletal: Comment: I  Degenerative Joint disease s/p right TKA (10/2020). In the evening will use a walker as her left knee is sore.  If she is out and about, will use cane.   (+) arthritis,     GI/Hepatic: Comment: CBD stenosis managed with ERCP and stenting. Last occurrence 5/2021.        (+) GERD, Asymptomatic on medication, hepatitis type Other, liver disease (s/p transplant.  Acute rejection earlier this year now back to baseline. ),     Renal/Genitourinary:  - neg Renal ROS   (+) renal disease, type: CRI, Pt does not require dialysis,     Endo: Comment:  Dexamethasone infusions  March 2021 for liver tx rejection and then Prednisone taper.  Completed taper ~one a month ago.     (+) thyroid problem, hypothyroidism, Obesity,     Psychiatric/Substance Use:  - neg psychiatric ROS     Infectious Disease: Comment: COVID (+) Asymptomatic in December 2020.  Already in hospital for diverticulitis.      COVID (+) May 2021 Had already received COVID vaccine).     (+) C.Diff while hospitalized for liver transplant.  - neg infectious disease ROS     Malignancy:  - neg malignancy ROS     Other: Comment: Lymphedema LE b/l - wears compression stockings daily.            No vital signs taken since this is a virtual visit.       Physical Exam  Constitutional: Awake, alert, cooperative, no apparent distress, and appears stated age.  HENT: Normocephalic   Respiratory: Regular respiratory rate.  Effort is non-labored, quiet, easy breathing.   Musculoskeletal:  Full ROM of neck.   Neurologic: Awake, alert, oriented to name, place and time.   Neuropsychiatric: Calm, cooperative. Normal affect.     **Please note, because this was a virtual visit due to COVID -19 pandemic, a full physical could not be completed.  On the DOS, the OOD of anesthesia will complete the appropriate components of the physical exam. Please refer to the physical examination documented by the anesthesiologist in the anesthesia record on the day of surgery. **    LABS:  CBC:   Lab Results   Component Value Date    WBC 5.3 09/10/2021    WBC 5.8 08/26/2021    HGB 13.1 09/10/2021    HGB 13.4 08/26/2021    HCT 38.4 09/10/2021    HCT 38.9 08/26/2021     (L) 09/10/2021     (L) 08/26/2021     BMP:   Lab Results   Component Value Date     09/10/2021     08/26/2021    POTASSIUM 4.3 09/10/2021    POTASSIUM 4.5 08/26/2021    CHLORIDE 109 09/10/2021    CHLORIDE 107 08/26/2021    CO2 28 09/10/2021    CO2 26 08/26/2021    BUN 20 09/10/2021    BUN 22 08/26/2021    CR 1.11 (H) 09/10/2021    CR 1.05 (H)  08/26/2021    GLC 88 09/10/2021    GLC 92 08/26/2021     COAGS:   Lab Results   Component Value Date    PTT 36 12/06/2020    INR 0.91 05/12/2021    FIBR 682 (H) 12/10/2020     POC:   Lab Results   Component Value Date    BGM 98 05/12/2021    HCGS Negative 06/18/2018     HEPATIC:   Lab Results   Component Value Date    ALBUMIN 3.5 09/10/2021    PROTTOTAL 7.0 09/10/2021    ALT 27 09/10/2021    AST 36 09/10/2021    ALKPHOS 147 09/10/2021    BILITOTAL 1.8 (H) 09/10/2021    DOROTHEA 23 09/04/2019     OTHER:   Lab Results   Component Value Date    PH 7.46 (H) 09/11/2019    LACT 0.8 10/28/2020    A1C 5.0 08/18/2019    JACOB 8.4 (L) 09/10/2021    PHOS 3.8 07/28/2020    MAG 1.8 12/06/2020    LIPASE 46 (L) 05/12/2021    AMYLASE 44 05/12/2021    TSH 0.94 08/19/2021    T4 0.70 (L) 09/11/2019    CRP 51.6 (H) 12/10/2020     (H) 10/28/2020          PROCEDURES     XR Knee b/l 8/26/2021     2 views right and left knee radiographs 8/26/2021 11:27 AM     History: Status post right knee replacement      Comparison: Radiographs 11/23/2020, 6/25/2020     Findings:     AP and lateral views of the right and left knee were obtained.      Left: No acute osseous abnormality.  Small joint effusion.     Severe joint space narrowing of the medial compartment, similar to     prior.     Soft tissue is unremarkable.     Right:       Postsurgical changes of total right knee arthroplasty. Hardware     appears intact without evidence of complication.     No acute osseous abnormality.  No substantial joint effusion.     No patellar tilt or lateral subluxation.  Soft tissue is unremarkable.                                                                   Impression:     1. Postsurgical changes of total right knee arthroplasty. No evidence     of hardware complication.     2. Severe joint space narrowing of the left medial compartment.       I have personally reviewed the examination and initial interpretation     and I agree with the findings.       ANUEL BERMEO MD (Joe)     EKG 12/5/2020: Sinus rhythm     STRESS ECHOCARDIOGRAM  2019     Interpretation Summary     Conclusions: dobutamine stress echocardiogram test negative for ischemia at     diagnostic level of peak stress (87% MPHR).           Patient experienced no chest pain with peak stress.     Test terminated due to target HR achieved.     Normal BP and HR response to dobutamine.     At baseline, LVEF 65%, normal wall motion.     At low dose dobutamine, LV cavity decreases appropriately. LVEF 70%. All walls     recruit normally.     At peak dose dobutamine, LVEF > 75% with normal wall motion.     EKG at rest and with stress with no ischemic changes.     Screening 2D echocardiogram with Doppler interrogation demonstrated no     significant valvular disease. Normal size proximal ascending aorta.        --   The patient's records and results personally reviewed by this provider.     Outside records reviewed from: care everywhere    ASSESSMENT and PLAN    Specific operative considerations:   - DIXIE # of risks 3/8 = intermediate  - VTE risk:  0.5%  - Risk of PONV score = 3.  If > 2, anti-emetic intervention recommended.      #  Cardiology   - Denies known coronary artery disease.  Denies cardiac symptoms. Records indicate a hx of HTN.  Patient denies this.  METS:  < 4.  Attributes METS to left knee pain.  DSE (2019) negative for ischemia  Intermediate risk surgery. RCRI : No serious cardiac risks.  0.4 % risk of major adverse cardiac event.         #  Pulmonary   - Quit smoking 2011  - Denies pulmonary symptoms  - No inhalers     #  Hematology   -  Thrombocytopenia, Plts 128 (9/10/21)     #  GI    - GERD, take H2B DOS   - Common bile duct stenosis treated with ERCP and stenting (5/021).    # Hepatic  - Liver Cirrhosis 2/2 ANGULO s/p liver transplant 8/2019.  Treated earlier this year for rejection with high dose steroids.  Completed steroid taper ~one month ago.  Take immunosuppression medication DOS.      # Renal   - CKD with GFR 54 and Cr 1.11.       #  Endocrine     -  Obesity, BMI 40. Consideration for careful position and lifting techniques.    - Hypothyroidism, take Levothyroxine DOS.    #  Ortho  - degenerative joint disease of knees, b/l, s/p right TKA 10/23/2020 with Dr. Wallace.  Now ready to move forward with left knee due to progressive knee pain limiting mobility. Labs to be drawn DOS. Recommend fall precautions.   - Lymphedema of LE, b/l.  Wears compression stockings.     #  ID  - COVID-19 testing per surgeon's office  - (+) COVID 2020 and 2021 >>>asymptomatic both time.     #   Anesthesia considerations  - Slow to wake.    -  Refer to PAC assessment in anesthesia records      Arrival time, NPO, shower and medication instructions provided by nursing staff today.    Patient was discussed with Dr Vee.  Patient is an acceptable candidate for the proposed procedure.  No further diagnostic evaluation is needed.     --    TYPE OF VISIT  Type of service:  Video Visit    Patient verbally consented to video service today: YES    Two identifiers used:  yes (name and )    Video Start Time: 10:36  Video End Time (time video stopped): 11:00    Originating Location (pt. Location): Home    Distant Location (provider location):  home    Mode of Communication:  Video Conference via [employer]    Please note, because this was a virtual visit, a full physical could not be completed.  On the DOS, the OOD of anesthesia will complete the appropriate components of the physical exam.      SANTINO Garcia CNP  Preoperative Assessment Center  [facility]  Phone: [phone]  Fax: [phone]

## 2021-09-15 NOTE — PROGRESS NOTES
Nicci is a 61 year old who is being evaluated via a billable video visit.      How would you like to obtain your AVS? MyChart  If the video visit is dropped, the invitation should be resent by: Text to cell phone: [phone]      HPI         Review of Systems         Objective    Vitals - Patient Reported  Pain Score: Severe Pain (7) (knees, primarily left)        Physical Exam

## 2021-09-15 NOTE — H&P
"  Pre-Operative H & P     CC:  Preoperative exam to assess for increased cardiopulmonary risk while undergoing surgery and anesthesia.    Date of Encounter: 9/15/2021  Primary Care Physician:  Wale Thurston Niecy is a 61 year old female who presents for pre-operative H & P in preparation for  Left total knee arthroplasty - Left with Mario Wallace MD on 9/24/2021 at [facility] under general anesthesia.       Ms. 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   Ms. Pemberton was seen by Dr. Wallace on 8/26/2021 for her degenerative joint disease of her knees b/l. She is status post right total knee arthroplasty with Dr. Wallace on 10/23/2020.  She has done well with her right knee replacement and endorsed marked pain on her contralateral left knee. She was interested in surgical intervention for her left knee.  Dr. Wallace counseled her on the above surgery.  Per Dr. Wallace's note,  \"Postoperatively we will use the same regimen as her prior knee surgery in terms of aspirin for DVT prophylaxis for 1 month duration\".  Ms. Pemberton presents to the PAC virtually today in preparation for the above surgery.      Diagnosis     Left knee pain     History is obtained from the patient and electronic health record.     Past Medical History  Past Medical History:   Diagnosis Date     Anemia      Cellulitis      Cirrhosis of liver (H) 06/18/2018     Gastroesophageal reflux disease      Heterozygous alpha 1-antitrypsin deficiency (H)      High hepatic iron concentration determined by biopsy of liver      Liver cirrhosis secondary to ANGULO (H)      Liver transplanted (H) 08/18/2019     Lymphedema      Osteoarthritis  "    knees     SBP (spontaneous bacterial peritonitis) (H) 08/02/2019 8/1/19 in [facility]     Thrombocytopenia (H) 11/2020     Thyroid disease     Hypothyroid       Past Surgical History  Past Surgical History:   Procedure Laterality Date     ARTHROPLASTY KNEE Right 10/23/2020    Procedure: Right  total knee arthroplasty;  Surgeon: Mario Wallace MD;  Location: [facility] OR     BENCH LIVER N/A 8/18/2019    Procedure: BACKBENCH PREPARATION, LIVER;  Surgeon: Mandeep Alford MD;  Location: [facility] OR     COLONOSCOPY N/A 6/22/2018    Procedure: COLONOSCOPY;  colonoscopy;  Surgeon: Hugo Patel MD;  [facility]: [facility]     ENDOSCOPIC RETROGRADE CHOLANGIOPANCREATOGRAM N/A 2/10/2020    Procedure: ENDOSCOPIC RETROGRADE CHOLANGIOPANCREATOGRAPHY with spinchterotomy;  Surgeon: Guru Rigoberto Canada MD;  [facility]: [facility]     ENDOSCOPIC RETROGRADE CHOLANGIOPANCREATOGRAM N/A 5/13/2020    Procedure: ENDOSCOPIC RETROGRADE CHOLANGIOPANCREATOGRAPHY,Stent exchange and sludge removal;  Surgeon: Guru Rigoberto Canada MD;  [facility]     ENDOSCOPIC RETROGRADE CHOLANGIOPANCREATOGRAM N/A 2/24/2021    Procedure: ENDOSCOPIC RETROGRADE CHOLANGIOPANCREATOGRAPHY, SPINCTEROTOMY, DEBRIDE REMOVAL, STENT PLACEMENT;  Surgeon: Guru Rigoberto Canada MD;  [facility]     ENDOSCOPIC RETROGRADE CHOLANGIOPANCREATOGRAPHY, EXCHANGE TUBE/STENT N/A 3/4/2020    Procedure: ENDOSCOPIC RETROGRADE CHOLANGIOPANCREATOGRAPHY, WITH biliary dilarion, stent exchange, stone removal;  Surgeon: Guru Rigoberto Canada MD;  [facility]     ENDOSCOPIC RETROGRADE CHOLANGIOPANCREATOGRAPHY, EXCHANGE TUBE/STENT N/A 5/12/2021    Procedure: ENDOSCOPIC RETROGRADE CHOLANGIOPANCREATOGRAPHY WITH BILIARY STENT EXCHANGE, DILATION AND SLUDGE/STONE REMOVAL;  Surgeon: Guru Rigoberto Canada MD;  [facility]     ESOPHAGOSCOPY, GASTROSCOPY, DUODENOSCOPY (EGD), COMBINED N/A 10/31/2019    Procedure:  Esophagogastroduodenoscopy, With Biopsy;  Surgeon: Nerissa Aranda MD;  [facility]: [facility]     IR FEEDING TUBE PLACEMENT W MOHAN/MD  2019     IR FLUORO 0-1 HOUR  2019     IR LIVER BIOPSY PERCUTANEOUS  3/25/2021     IR LUMBAR PUNCTURE  2019     KNEE SURGERY  10/23/20     TRANSPLANT LIVER RECIPIENT  DONOR N/A 2019    Procedure: TRANSPLANT, LIVER, RECIPIENT,  DONOR;  Surgeon: Mandeep Alford MD;  [facility]     US PARACENTESIS  2019     US PARACENTESIS WITH ALBUMIN  2019       Hx of Blood transfusions/reactions: yes without reaction      Hx of abnormal bleeding or anti-platelet use: denies     Menstrual history: No LMP recorded. Patient is postmenopausal.:     Steroid use in the last year: yes     Personal or FH with difficulty with Anesthesia:  See below  Prior to Admission Medications  Current Outpatient Medications   Medication Sig Dispense Refill     acetaminophen (TYLENOL) 325 MG tablet Take 325-650 mg by mouth every 6 hours as needed for mild pain       Cholecalciferol (VITAMIN D-3) 25 MCG (1000 UT) CAPS Take 1,000 Units by mouth 2 times daily 60 capsule 1     cycloSPORINE modified (GENERIC EQUIVALENT) 25 MG capsule Take 4 capsules (100 mg) by mouth every morning AND 4 capsules (100 mg) every evening. 720 capsule 3     famotidine (PEPCID) 20 MG tablet Take 1 tablet (20 mg) by mouth 2 times daily (Patient taking differently: Take 20 mg by mouth 2 times daily Daily only) 60 tablet 3     levothyroxine (SYNTHROID/LEVOTHROID) 125 MCG tablet Take 125 mcg by mouth every morning        mycophenolate (GENERIC EQUIVALENT) 250 MG capsule Take 2 capsules (500 mg) by mouth 2 times daily 360 capsule 3     COMPRESSION STOCKINGS 2 each every 12 hours 2 Product 1       Allergies  Allergies   Allergen Reactions     Ciprofloxacin Dizziness and Nausea     Food      Fruits with cores and pits  Apples     Sulfa Drugs Other (See Comments)     Swollen joints     Furosemide  Rash       Social History  Social History     Socioeconomic History     Marital status:      Spouse name: Not on file     Number of children: Not on file     Years of education: Not on file     Highest education level: Not on file   Occupational History     Not on file   Tobacco Use     Smoking status: Former Smoker     Packs/day: 0.50     Years: 10.00     Pack years: 5.00     Types: Cigarettes     Start date: 2000     Quit date:      Years since quittin.9     Smokeless tobacco: Never Used   Substance and Sexual Activity     Alcohol use: No     Comment: due to liver transplant,      Drug use: Never     Sexual activity: Not Currently     Partners: Male     Birth control/protection: Post-menopausal   Other Topics Concern     Parent/sibling w/ CABG, MI or angioplasty before 65F 55M? Not Asked   Social History Narrative     Not on file     Social Determinants of Health     Financial Resource Strain:      Difficulty of Paying Living Expenses:    Food Insecurity:      Worried About Running Out of Food in the Last Year:      Ran Out of Food in the Last Year:    Transportation Needs:      Lack of Transportation (Medical):      Lack of Transportation (Non-Medical):    Physical Activity:      Days of Exercise per Week:      Minutes of Exercise per Session:    Stress:      Feeling of Stress :    Social Connections:      Frequency of Communication with Friends and Family:      Frequency of Social Gatherings with Friends and Family:      Attends Hinduism Services:      Active Member of Clubs or Organizations:      Attends Club or Organization Meetings:      Marital Status:    Intimate Partner Violence:      Fear of Current or Ex-Partner:      Emotionally Abused:      Physically Abused:      Sexually Abused:        Family History  Family History   Problem Relation Age of Onset     Diabetes Maternal Grandfather         Diagnosed at age 83     Cirrhosis No family hx of      Liver Cancer No family hx of      No  Known Problems Mother      Restless Leg Syndrome Father      Mental Illness Sister      Alcoholism Brother      No Known Problems Son      Mental Illness Maternal Grandfather      Heart Failure Paternal Grandmother      No Known Problems Son      No Known Problems Maternal Grandmother      Breast Cancer Paternal Aunt        Anesthesia Evaluation   Pt has had prior anesthetic. Type: General and MAC.    History of anesthetic complications   Slow to wake..    ROS/MED HX  ENT/Pulmonary:     (+) DIXIE risk factors, obese, tobacco use (Quit 10 years ago.  Social smoker (weekends).), Past use,     Neurologic:  - neg neurologic ROS     Cardiovascular: Comment: Denies cardiac symptoms including chest pain, SOB, palpitations, syncope, CAMARA, orthopnea, or PND.    Dobutamine stress echocardiogram test negative for ischemia on 2019.    (+) -----Previous cardiac testing   Echo: Date: Results:    Stress Test: Date: 2019 Results:    ECG Reviewed: Date: Results:    Cath: Date: Results:   (-) taking anticoagulants/antiplateletsHypertension: Denies a h/o HTN.   METS/Exercise Tolerance:  Comment: METS<4 secondary to knee pain.  Able to walk mailbox daily (one block) slow and steady due to left knee pain.    Hematologic: Comments: H/o thromobocytopenia    (+) history of blood transfusion, no previous transfusion reaction, Known PRBC Anitbodies:No  (-) history of blood clots   Musculoskeletal: Comment: I  Degenerative Joint disease s/p right TKA (10/2020). In the evening will use a walker as her left knee is sore.  If she is out and about, will use cane.   (+) arthritis,     GI/Hepatic: Comment: CBD stenosis managed with ERCP and stenting. Last occurrence 5/2021.        (+) GERD, Asymptomatic on medication, hepatitis type Other, liver disease (s/p transplant.  Acute rejection earlier this year now back to baseline. ),     Renal/Genitourinary:  - neg Renal ROS   (+) renal disease, type: CRI, Pt does not require dialysis,     Endo: Comment:  Dexamethasone infusions  March 2021 for liver tx rejection and then Prednisone taper.  Completed taper ~one a month ago.     (+) thyroid problem, hypothyroidism, Obesity,     Psychiatric/Substance Use:  - neg psychiatric ROS     Infectious Disease: Comment: COVID (+) Asymptomatic in December 2020.  Already in hospital for diverticulitis.      COVID (+) May 2021 Had already received COVID vaccine).     (+) C.Diff while hospitalized for liver transplant.  - neg infectious disease ROS     Malignancy:  - neg malignancy ROS     Other: Comment: Lymphedema LE b/l - wears compression stockings daily.            No vital signs taken since this is a virtual visit.       Physical Exam  Constitutional: Awake, alert, cooperative, no apparent distress, and appears stated age.  HENT: Normocephalic   Respiratory: Regular respiratory rate.  Effort is non-labored, quiet, easy breathing.   Musculoskeletal:  Full ROM of neck.   Neurologic: Awake, alert, oriented to name, place and time.   Neuropsychiatric: Calm, cooperative. Normal affect.     **Please note, because this was a virtual visit due to COVID -19 pandemic, a full physical could not be completed.  On the DOS, the OOD of anesthesia will complete the appropriate components of the physical exam. Please refer to the physical examination documented by the anesthesiologist in the anesthesia record on the day of surgery. **    LABS:  CBC:   Lab Results   Component Value Date    WBC 5.3 09/10/2021    WBC 5.8 08/26/2021    HGB 13.1 09/10/2021    HGB 13.4 08/26/2021    HCT 38.4 09/10/2021    HCT 38.9 08/26/2021     (L) 09/10/2021     (L) 08/26/2021     BMP:   Lab Results   Component Value Date     09/10/2021     08/26/2021    POTASSIUM 4.3 09/10/2021    POTASSIUM 4.5 08/26/2021    CHLORIDE 109 09/10/2021    CHLORIDE 107 08/26/2021    CO2 28 09/10/2021    CO2 26 08/26/2021    BUN 20 09/10/2021    BUN 22 08/26/2021    CR 1.11 (H) 09/10/2021    CR 1.05 (H)  08/26/2021    GLC 88 09/10/2021    GLC 92 08/26/2021     COAGS:   Lab Results   Component Value Date    PTT 36 12/06/2020    INR 0.91 05/12/2021    FIBR 682 (H) 12/10/2020     POC:   Lab Results   Component Value Date    BGM 98 05/12/2021    HCGS Negative 06/18/2018     HEPATIC:   Lab Results   Component Value Date    ALBUMIN 3.5 09/10/2021    PROTTOTAL 7.0 09/10/2021    ALT 27 09/10/2021    AST 36 09/10/2021    ALKPHOS 147 09/10/2021    BILITOTAL 1.8 (H) 09/10/2021    DOROTHEA 23 09/04/2019     OTHER:   Lab Results   Component Value Date    PH 7.46 (H) 09/11/2019    LACT 0.8 10/28/2020    A1C 5.0 08/18/2019    JACOB 8.4 (L) 09/10/2021    PHOS 3.8 07/28/2020    MAG 1.8 12/06/2020    LIPASE 46 (L) 05/12/2021    AMYLASE 44 05/12/2021    TSH 0.94 08/19/2021    T4 0.70 (L) 09/11/2019    CRP 51.6 (H) 12/10/2020     (H) 10/28/2020          PROCEDURES     XR Knee b/l 8/26/2021     2 views right and left knee radiographs 8/26/2021 11:27 AM     History: Status post right knee replacement      Comparison: Radiographs 11/23/2020, 6/25/2020     Findings:     AP and lateral views of the right and left knee were obtained.      Left: No acute osseous abnormality.  Small joint effusion.     Severe joint space narrowing of the medial compartment, similar to     prior.     Soft tissue is unremarkable.     Right:       Postsurgical changes of total right knee arthroplasty. Hardware     appears intact without evidence of complication.     No acute osseous abnormality.  No substantial joint effusion.     No patellar tilt or lateral subluxation.  Soft tissue is unremarkable.                                                                   Impression:     1. Postsurgical changes of total right knee arthroplasty. No evidence     of hardware complication.     2. Severe joint space narrowing of the left medial compartment.       I have personally reviewed the examination and initial interpretation     and I agree with the findings.       ANUEL BERMEO MD (Joe)     EKG 12/5/2020: Sinus rhythm     STRESS ECHOCARDIOGRAM  2019     Interpretation Summary     Conclusions: dobutamine stress echocardiogram test negative for ischemia at     diagnostic level of peak stress (87% MPHR).           Patient experienced no chest pain with peak stress.     Test terminated due to target HR achieved.     Normal BP and HR response to dobutamine.     At baseline, LVEF 65%, normal wall motion.     At low dose dobutamine, LV cavity decreases appropriately. LVEF 70%. All walls     recruit normally.     At peak dose dobutamine, LVEF > 75% with normal wall motion.     EKG at rest and with stress with no ischemic changes.     Screening 2D echocardiogram with Doppler interrogation demonstrated no     significant valvular disease. Normal size proximal ascending aorta.        --   The patient's records and results personally reviewed by this provider.     Outside records reviewed from: care everywhere    ASSESSMENT and PLAN    Specific operative considerations:   - DIXIE # of risks 3/8 = intermediate  - VTE risk:  0.5%  - Risk of PONV score = 3.  If > 2, anti-emetic intervention recommended.      #  Cardiology   - Denies known coronary artery disease.  Denies cardiac symptoms. Records indicate a hx of HTN.  Patient denies this.  METS:  < 4.  Attributes METS to left knee pain.  DSE (2019) negative for ischemia  Intermediate risk surgery. RCRI : No serious cardiac risks.  0.4 % risk of major adverse cardiac event.         #  Pulmonary   - Quit smoking 2011  - Denies pulmonary symptoms  - No inhalers     #  Hematology   -  Thrombocytopenia, Plts 128 (9/10/21)     #  GI    - GERD, take H2B DOS   - Common bile duct stenosis treated with ERCP and stenting (5/021).    # Hepatic  - Liver Cirrhosis 2/2 ANGULO s/p liver transplant 8/2019.  Treated earlier this year for rejection with high dose steroids.  Completed steroid taper ~one month ago.  Take immunosuppression medication DOS.      # Renal   - CKD with GFR 54 and Cr 1.11.       #  Endocrine     -  Obesity, BMI 40. Consideration for careful position and lifting techniques.    - Hypothyroidism, take Levothyroxine DOS.    #  Ortho  - degenerative joint disease of knees, b/l, s/p right TKA 10/23/2020 with Dr. Wallace.  Now ready to move forward with left knee due to progressive knee pain limiting mobility. Labs to be drawn DOS. Recommend fall precautions.   - Lymphedema of LE, b/l.  Wears compression stockings.     #  ID  - COVID-19 testing per surgeon's office  - (+) COVID 2020 and 2021 >>>asymptomatic both time.     #   Anesthesia considerations  - Slow to wake.    -  Refer to PAC assessment in anesthesia records      Arrival time, NPO, shower and medication instructions provided by nursing staff today.    Patient was discussed with Dr Vee.  Patient is an acceptable candidate for the proposed procedure.  No further diagnostic evaluation is needed.     --    TYPE OF VISIT  Type of service:  Video Visit    Patient verbally consented to video service today: YES    Two identifiers used:  yes (name and )    Video Start Time: 10:36  Video End Time (time video stopped): 11:00    Originating Location (pt. Location): Home    Distant Location (provider location):  home    Mode of Communication:  Video Conference via [employer]    Please note, because this was a virtual visit, a full physical could not be completed.  On the DOS, the OOD of anesthesia will complete the appropriate components of the physical exam.      SANTINO Garcia CNP  Preoperative Assessment Center  [facility]  Phone: [phone]  Fax: [phone]

## 2021-09-22 ENCOUNTER — LAB (OUTPATIENT)
Dept: LAB | Facility: CLINIC | Age: 61
End: 2021-09-22
Attending: ORTHOPAEDIC SURGERY
Payer: COMMERCIAL

## 2021-09-22 DIAGNOSIS — Z11.59 ENCOUNTER FOR SCREENING FOR OTHER VIRAL DISEASES: ICD-10-CM

## 2021-09-22 PROCEDURE — U0005 INFEC AGEN DETEC AMPLI PROBE: HCPCS

## 2021-09-22 PROCEDURE — U0003 INFECTIOUS AGENT DETECTION BY NUCLEIC ACID (DNA OR RNA); SEVERE ACUTE RESPIRATORY SYNDROME CORONAVIRUS 2 (SARS-COV-2) (CORONAVIRUS DISEASE [COVID-19]), AMPLIFIED PROBE TECHNIQUE, MAKING USE OF HIGH THROUGHPUT TECHNOLOGIES AS DESCRIBED BY CMS-2020-01-R: HCPCS

## 2021-09-23 ENCOUNTER — ANESTHESIA EVENT (OUTPATIENT)
Dept: SURGERY | Facility: CLINIC | Age: 61
DRG: 470 | End: 2021-09-23
Payer: COMMERCIAL

## 2021-09-23 LAB — SARS-COV-2 RNA RESP QL NAA+PROBE: NEGATIVE

## 2021-09-23 ASSESSMENT — LIFESTYLE VARIABLES: TOBACCO_USE: 1

## 2021-09-24 ENCOUNTER — APPOINTMENT (OUTPATIENT)
Dept: GENERAL RADIOLOGY | Facility: CLINIC | Age: 61
DRG: 470 | End: 2021-09-24
Attending: ORTHOPAEDIC SURGERY
Payer: COMMERCIAL

## 2021-09-24 ENCOUNTER — ANESTHESIA (OUTPATIENT)
Dept: SURGERY | Facility: CLINIC | Age: 61
DRG: 470 | End: 2021-09-24
Payer: COMMERCIAL

## 2021-09-24 ENCOUNTER — HOSPITAL ENCOUNTER (INPATIENT)
Facility: CLINIC | Age: 61
LOS: 4 days | Discharge: HOME | DRG: 470 | End: 2021-09-28
Attending: ORTHOPAEDIC SURGERY | Admitting: ORTHOPAEDIC SURGERY
Payer: COMMERCIAL

## 2021-09-24 DIAGNOSIS — Z98.890 H/O ARTHROSCOPIC KNEE SURGERY: ICD-10-CM

## 2021-09-24 DIAGNOSIS — M17.12 PRIMARY OSTEOARTHRITIS OF LEFT KNEE: Primary | ICD-10-CM

## 2021-09-24 DIAGNOSIS — M25.562 LEFT KNEE PAIN: ICD-10-CM

## 2021-09-24 LAB
ABO/RH(D): NORMAL
ANTIBODY SCREEN: NEGATIVE
APTT PPP: 30 SECONDS (ref 22–38)
CREAT SERPL-MCNC: 1.16 MG/DL (ref 0.52–1.04)
GFR SERPL CREATININE-BSD FRML MDRD: 51 ML/MIN/1.73M2
GLUCOSE BLDC GLUCOMTR-MCNC: 102 MG/DL (ref 70–99)
GLUCOSE BLDC GLUCOMTR-MCNC: 106 MG/DL (ref 70–99)
INR PPP: 0.91 (ref 0.86–1.14)
PLATELET # BLD AUTO: 142 10E3/UL (ref 150–450)
SPECIMEN EXPIRATION DATE: NORMAL

## 2021-09-24 PROCEDURE — 73560 X-RAY EXAM OF KNEE 1 OR 2: CPT | Mod: 26 | Performed by: RADIOLOGY

## 2021-09-24 PROCEDURE — 250N000011 HC RX IP 250 OP 636: Performed by: STUDENT IN AN ORGANIZED HEALTH CARE EDUCATION/TRAINING PROGRAM

## 2021-09-24 PROCEDURE — 278N000051 HC OR IMPLANT GENERAL: Performed by: ORTHOPAEDIC SURGERY

## 2021-09-24 PROCEDURE — 250N000012 HC RX MED GY IP 250 OP 636 PS 637: Performed by: INTERNAL MEDICINE

## 2021-09-24 PROCEDURE — 250N000013 HC RX MED GY IP 250 OP 250 PS 637: Performed by: PHYSICIAN ASSISTANT

## 2021-09-24 PROCEDURE — 250N000013 HC RX MED GY IP 250 OP 250 PS 637: Performed by: STUDENT IN AN ORGANIZED HEALTH CARE EDUCATION/TRAINING PROGRAM

## 2021-09-24 PROCEDURE — 0SRD0J9 REPLACEMENT OF LEFT KNEE JOINT WITH SYNTHETIC SUBSTITUTE, CEMENTED, OPEN APPROACH: ICD-10-PCS | Performed by: ORTHOPAEDIC SURGERY

## 2021-09-24 PROCEDURE — 94660 CPAP INITIATION&MGMT: CPT

## 2021-09-24 PROCEDURE — 250N000011 HC RX IP 250 OP 636: Performed by: PHYSICIAN ASSISTANT

## 2021-09-24 PROCEDURE — 258N000003 HC RX IP 258 OP 636: Performed by: STUDENT IN AN ORGANIZED HEALTH CARE EDUCATION/TRAINING PROGRAM

## 2021-09-24 PROCEDURE — 120N000002 HC R&B MED SURG/OB UMMC

## 2021-09-24 PROCEDURE — 82565 ASSAY OF CREATININE: CPT | Performed by: NURSE PRACTITIONER

## 2021-09-24 PROCEDURE — 250N000011 HC RX IP 250 OP 636: Performed by: ORTHOPAEDIC SURGERY

## 2021-09-24 PROCEDURE — C1776 JOINT DEVICE (IMPLANTABLE): HCPCS | Performed by: ORTHOPAEDIC SURGERY

## 2021-09-24 PROCEDURE — 85730 THROMBOPLASTIN TIME PARTIAL: CPT | Performed by: NURSE PRACTITIONER

## 2021-09-24 PROCEDURE — 99222 1ST HOSP IP/OBS MODERATE 55: CPT | Performed by: INTERNAL MEDICINE

## 2021-09-24 PROCEDURE — 36415 COLL VENOUS BLD VENIPUNCTURE: CPT | Performed by: NURSE PRACTITIONER

## 2021-09-24 PROCEDURE — 85049 AUTOMATED PLATELET COUNT: CPT | Performed by: NURSE PRACTITIONER

## 2021-09-24 PROCEDURE — 27447 TOTAL KNEE ARTHROPLASTY: CPT | Mod: LT | Performed by: ORTHOPAEDIC SURGERY

## 2021-09-24 PROCEDURE — 99207 PR CONSULT E&M CHANGED TO INITIAL LEVEL: CPT | Performed by: INTERNAL MEDICINE

## 2021-09-24 PROCEDURE — 999N000065 XR KNEE PORT LEFT 1/2 VIEWS

## 2021-09-24 PROCEDURE — 86901 BLOOD TYPING SEROLOGIC RH(D): CPT | Performed by: NURSE PRACTITIONER

## 2021-09-24 PROCEDURE — 250N000025 HC SEVOFLURANE, PER MIN: Performed by: ORTHOPAEDIC SURGERY

## 2021-09-24 PROCEDURE — 999N000141 HC STATISTIC PRE-PROCEDURE NURSING ASSESSMENT: Performed by: ORTHOPAEDIC SURGERY

## 2021-09-24 PROCEDURE — 272N000001 HC OR GENERAL SUPPLY STERILE: Performed by: ORTHOPAEDIC SURGERY

## 2021-09-24 PROCEDURE — 258N000001 HC RX 258: Performed by: ORTHOPAEDIC SURGERY

## 2021-09-24 PROCEDURE — 258N000003 HC RX IP 258 OP 636: Performed by: ORTHOPAEDIC SURGERY

## 2021-09-24 PROCEDURE — 85610 PROTHROMBIN TIME: CPT | Performed by: NURSE PRACTITIONER

## 2021-09-24 PROCEDURE — 370N000017 HC ANESTHESIA TECHNICAL FEE, PER MIN: Performed by: ORTHOPAEDIC SURGERY

## 2021-09-24 PROCEDURE — 710N000011 HC RECOVERY PHASE 1, LEVEL 3, PER MIN: Performed by: ORTHOPAEDIC SURGERY

## 2021-09-24 PROCEDURE — 360N000077 HC SURGERY LEVEL 4, PER MIN: Performed by: ORTHOPAEDIC SURGERY

## 2021-09-24 PROCEDURE — 250N000013 HC RX MED GY IP 250 OP 250 PS 637: Performed by: INTERNAL MEDICINE

## 2021-09-24 PROCEDURE — 999N000157 HC STATISTIC RCP TIME EA 10 MIN

## 2021-09-24 DEVICE — IMP COMP PATELLA BIOM ARCM MED 34MM 11-150842: Type: IMPLANTABLE DEVICE | Site: KNEE | Status: FUNCTIONAL

## 2021-09-24 DEVICE — IMP COMP TIBIAL KNEE ASCENT 71MM 141233: Type: IMPLANTABLE DEVICE | Site: KNEE | Status: FUNCTIONAL

## 2021-09-24 DEVICE — IMPLANTABLE DEVICE
Type: IMPLANTABLE DEVICE | Site: KNEE | Status: FUNCTIONAL
Brand: VANGUARD® KNEE SYSTEM

## 2021-09-24 DEVICE — BONE CEMENT SIMPLEX FULL DOSE 6191-1-001: Type: IMPLANTABLE DEVICE | Site: KNEE | Status: FUNCTIONAL

## 2021-09-24 DEVICE — IMPLANTABLE DEVICE
Type: IMPLANTABLE DEVICE | Site: KNEE | Status: NON-FUNCTIONAL
Brand: VANGUARD® KNEE SYSTEM
Removed: 2024-07-13

## 2021-09-24 RX ORDER — FENTANYL CITRATE 50 UG/ML
25 INJECTION, SOLUTION INTRAMUSCULAR; INTRAVENOUS EVERY 5 MIN PRN
Status: DISCONTINUED | OUTPATIENT
Start: 2021-09-24 | End: 2021-09-24 | Stop reason: HOSPADM

## 2021-09-24 RX ORDER — CEFAZOLIN SODIUM 2 G/100ML
2 INJECTION, SOLUTION INTRAVENOUS EVERY 8 HOURS
Status: COMPLETED | OUTPATIENT
Start: 2021-09-24 | End: 2021-09-25

## 2021-09-24 RX ORDER — HYDROMORPHONE HYDROCHLORIDE 2 MG/1
2-4 TABLET ORAL
Status: DISCONTINUED | OUTPATIENT
Start: 2021-09-24 | End: 2021-09-26

## 2021-09-24 RX ORDER — ONDANSETRON 2 MG/ML
4 INJECTION INTRAMUSCULAR; INTRAVENOUS EVERY 30 MIN PRN
Status: DISCONTINUED | OUTPATIENT
Start: 2021-09-24 | End: 2021-09-24 | Stop reason: HOSPADM

## 2021-09-24 RX ORDER — CEFAZOLIN SODIUM 2 G/100ML
2 INJECTION, SOLUTION INTRAVENOUS
Status: COMPLETED | OUTPATIENT
Start: 2021-09-24 | End: 2021-09-24

## 2021-09-24 RX ORDER — NALOXONE HYDROCHLORIDE 0.4 MG/ML
0.2 INJECTION, SOLUTION INTRAMUSCULAR; INTRAVENOUS; SUBCUTANEOUS
Status: DISCONTINUED | OUTPATIENT
Start: 2021-09-24 | End: 2021-09-28 | Stop reason: HOSPADM

## 2021-09-24 RX ORDER — ONDANSETRON 2 MG/ML
INJECTION INTRAMUSCULAR; INTRAVENOUS PRN
Status: DISCONTINUED | OUTPATIENT
Start: 2021-09-24 | End: 2021-09-24

## 2021-09-24 RX ORDER — LIDOCAINE 40 MG/G
CREAM TOPICAL
Status: DISCONTINUED | OUTPATIENT
Start: 2021-09-24 | End: 2021-09-24 | Stop reason: HOSPADM

## 2021-09-24 RX ORDER — HYDROXYZINE HYDROCHLORIDE 25 MG/1
25 TABLET, FILM COATED ORAL EVERY 6 HOURS PRN
Status: DISCONTINUED | OUTPATIENT
Start: 2021-09-24 | End: 2021-09-26

## 2021-09-24 RX ORDER — NALOXONE HYDROCHLORIDE 0.4 MG/ML
0.4 INJECTION, SOLUTION INTRAMUSCULAR; INTRAVENOUS; SUBCUTANEOUS
Status: DISCONTINUED | OUTPATIENT
Start: 2021-09-24 | End: 2021-09-28 | Stop reason: HOSPADM

## 2021-09-24 RX ORDER — CEFAZOLIN SODIUM 2 G/100ML
2 INJECTION, SOLUTION INTRAVENOUS SEE ADMIN INSTRUCTIONS
Status: DISCONTINUED | OUTPATIENT
Start: 2021-09-24 | End: 2021-09-24 | Stop reason: HOSPADM

## 2021-09-24 RX ORDER — ACETAMINOPHEN 325 MG/1
650 TABLET ORAL EVERY 4 HOURS PRN
Status: DISCONTINUED | OUTPATIENT
Start: 2021-09-27 | End: 2021-09-26

## 2021-09-24 RX ORDER — SODIUM CHLORIDE, SODIUM LACTATE, POTASSIUM CHLORIDE, CALCIUM CHLORIDE 600; 310; 30; 20 MG/100ML; MG/100ML; MG/100ML; MG/100ML
INJECTION, SOLUTION INTRAVENOUS CONTINUOUS
Status: DISCONTINUED | OUTPATIENT
Start: 2021-09-24 | End: 2021-09-24 | Stop reason: HOSPADM

## 2021-09-24 RX ORDER — OXYCODONE HYDROCHLORIDE 5 MG/1
5 TABLET ORAL EVERY 4 HOURS PRN
Status: DISCONTINUED | OUTPATIENT
Start: 2021-09-24 | End: 2021-09-24

## 2021-09-24 RX ORDER — HYDROMORPHONE HCL IN WATER/PF 6 MG/30 ML
0.2 PATIENT CONTROLLED ANALGESIA SYRINGE INTRAVENOUS
Status: DISCONTINUED | OUTPATIENT
Start: 2021-09-24 | End: 2021-09-28 | Stop reason: HOSPADM

## 2021-09-24 RX ORDER — CYCLOSPORINE 25 MG/1
100 CAPSULE, LIQUID FILLED ORAL
Status: DISCONTINUED | OUTPATIENT
Start: 2021-09-24 | End: 2021-09-28 | Stop reason: HOSPADM

## 2021-09-24 RX ORDER — HYDROMORPHONE HCL IN WATER/PF 6 MG/30 ML
0.4 PATIENT CONTROLLED ANALGESIA SYRINGE INTRAVENOUS
Status: DISCONTINUED | OUTPATIENT
Start: 2021-09-24 | End: 2021-09-28 | Stop reason: HOSPADM

## 2021-09-24 RX ORDER — MYCOPHENOLATE MOFETIL 250 MG/1
500 CAPSULE ORAL 2 TIMES DAILY
Status: DISCONTINUED | OUTPATIENT
Start: 2021-09-24 | End: 2021-09-28 | Stop reason: HOSPADM

## 2021-09-24 RX ORDER — PROPOFOL 10 MG/ML
INJECTION, EMULSION INTRAVENOUS PRN
Status: DISCONTINUED | OUTPATIENT
Start: 2021-09-24 | End: 2021-09-24

## 2021-09-24 RX ORDER — METOPROLOL TARTRATE 1 MG/ML
1-2 INJECTION, SOLUTION INTRAVENOUS EVERY 5 MIN PRN
Status: DISCONTINUED | OUTPATIENT
Start: 2021-09-24 | End: 2021-09-24 | Stop reason: HOSPADM

## 2021-09-24 RX ORDER — ACETAMINOPHEN 325 MG/1
975 TABLET ORAL EVERY 8 HOURS
Status: COMPLETED | OUTPATIENT
Start: 2021-09-24 | End: 2021-09-27

## 2021-09-24 RX ORDER — HYDROMORPHONE HYDROCHLORIDE 1 MG/ML
0.2 INJECTION, SOLUTION INTRAMUSCULAR; INTRAVENOUS; SUBCUTANEOUS EVERY 5 MIN PRN
Status: DISCONTINUED | OUTPATIENT
Start: 2021-09-24 | End: 2021-09-24 | Stop reason: HOSPADM

## 2021-09-24 RX ORDER — ONDANSETRON 4 MG/1
4 TABLET, ORALLY DISINTEGRATING ORAL EVERY 30 MIN PRN
Status: DISCONTINUED | OUTPATIENT
Start: 2021-09-24 | End: 2021-09-24 | Stop reason: HOSPADM

## 2021-09-24 RX ORDER — OXYCODONE HYDROCHLORIDE 10 MG/1
10 TABLET ORAL EVERY 4 HOURS PRN
Status: DISCONTINUED | OUTPATIENT
Start: 2021-09-24 | End: 2021-09-24

## 2021-09-24 RX ORDER — FENTANYL CITRATE 50 UG/ML
INJECTION, SOLUTION INTRAMUSCULAR; INTRAVENOUS PRN
Status: DISCONTINUED | OUTPATIENT
Start: 2021-09-24 | End: 2021-09-24

## 2021-09-24 RX ORDER — SODIUM CHLORIDE, SODIUM LACTATE, POTASSIUM CHLORIDE, CALCIUM CHLORIDE 600; 310; 30; 20 MG/100ML; MG/100ML; MG/100ML; MG/100ML
INJECTION, SOLUTION INTRAVENOUS CONTINUOUS
Status: DISCONTINUED | OUTPATIENT
Start: 2021-09-24 | End: 2021-09-28 | Stop reason: HOSPADM

## 2021-09-24 RX ORDER — SODIUM CHLORIDE, SODIUM LACTATE, POTASSIUM CHLORIDE, CALCIUM CHLORIDE 600; 310; 30; 20 MG/100ML; MG/100ML; MG/100ML; MG/100ML
INJECTION, SOLUTION INTRAVENOUS CONTINUOUS PRN
Status: DISCONTINUED | OUTPATIENT
Start: 2021-09-24 | End: 2021-09-24

## 2021-09-24 RX ORDER — TRANEXAMIC ACID 650 MG/1
1950 TABLET ORAL ONCE
Status: COMPLETED | OUTPATIENT
Start: 2021-09-24 | End: 2021-09-24

## 2021-09-24 RX ADMIN — HYDROMORPHONE HYDROCHLORIDE 0.5 MG: 1 INJECTION, SOLUTION INTRAMUSCULAR; INTRAVENOUS; SUBCUTANEOUS at 11:52

## 2021-09-24 RX ADMIN — HYDROMORPHONE HYDROCHLORIDE 0.5 MG: 1 INJECTION, SOLUTION INTRAMUSCULAR; INTRAVENOUS; SUBCUTANEOUS at 13:15

## 2021-09-24 RX ADMIN — SODIUM CHLORIDE, POTASSIUM CHLORIDE, SODIUM LACTATE AND CALCIUM CHLORIDE: 600; 310; 30; 20 INJECTION, SOLUTION INTRAVENOUS at 10:48

## 2021-09-24 RX ADMIN — HYDROMORPHONE HYDROCHLORIDE 0.2 MG: 1 INJECTION, SOLUTION INTRAMUSCULAR; INTRAVENOUS; SUBCUTANEOUS at 14:41

## 2021-09-24 RX ADMIN — FENTANYL CITRATE 25 MCG: 50 INJECTION, SOLUTION INTRAMUSCULAR; INTRAVENOUS at 15:25

## 2021-09-24 RX ADMIN — SODIUM CHLORIDE, POTASSIUM CHLORIDE, SODIUM LACTATE AND CALCIUM CHLORIDE: 600; 310; 30; 20 INJECTION, SOLUTION INTRAVENOUS at 23:53

## 2021-09-24 RX ADMIN — CEFAZOLIN SODIUM 2 G: 2 INJECTION, SOLUTION INTRAVENOUS at 22:07

## 2021-09-24 RX ADMIN — CEFAZOLIN 2 G: 10 INJECTION, POWDER, FOR SOLUTION INTRAVENOUS at 10:50

## 2021-09-24 RX ADMIN — HYDROMORPHONE HYDROCHLORIDE 0.2 MG: 1 INJECTION, SOLUTION INTRAMUSCULAR; INTRAVENOUS; SUBCUTANEOUS at 14:48

## 2021-09-24 RX ADMIN — FENTANYL CITRATE 25 MCG: 50 INJECTION, SOLUTION INTRAMUSCULAR; INTRAVENOUS at 14:54

## 2021-09-24 RX ADMIN — CYCLOSPORINE 100 MG: 100 CAPSULE, LIQUID FILLED ORAL at 21:29

## 2021-09-24 RX ADMIN — HYDROMORPHONE HYDROCHLORIDE 0.5 MG: 1 INJECTION, SOLUTION INTRAMUSCULAR; INTRAVENOUS; SUBCUTANEOUS at 14:34

## 2021-09-24 RX ADMIN — FENTANYL CITRATE 25 MCG: 50 INJECTION, SOLUTION INTRAMUSCULAR; INTRAVENOUS at 11:18

## 2021-09-24 RX ADMIN — HYDROMORPHONE HYDROCHLORIDE 0.2 MG: 1 INJECTION, SOLUTION INTRAMUSCULAR; INTRAVENOUS; SUBCUTANEOUS at 15:46

## 2021-09-24 RX ADMIN — FENTANYL CITRATE 50 MCG: 50 INJECTION, SOLUTION INTRAMUSCULAR; INTRAVENOUS at 11:39

## 2021-09-24 RX ADMIN — MIDAZOLAM 1 MG: 1 INJECTION INTRAMUSCULAR; INTRAVENOUS at 10:49

## 2021-09-24 RX ADMIN — FENTANYL CITRATE 25 MCG: 50 INJECTION, SOLUTION INTRAMUSCULAR; INTRAVENOUS at 15:00

## 2021-09-24 RX ADMIN — MYCOPHENOLATE MOFETIL 500 MG: 250 CAPSULE ORAL at 21:30

## 2021-09-24 RX ADMIN — ONDANSETRON 4 MG: 2 INJECTION INTRAMUSCULAR; INTRAVENOUS at 14:21

## 2021-09-24 RX ADMIN — ACETAMINOPHEN 975 MG: 325 TABLET, FILM COATED ORAL at 23:55

## 2021-09-24 RX ADMIN — ONDANSETRON 4 MG: 2 INJECTION INTRAMUSCULAR; INTRAVENOUS at 16:26

## 2021-09-24 RX ADMIN — HYDROMORPHONE HYDROCHLORIDE 2 MG: 2 TABLET ORAL at 23:59

## 2021-09-24 RX ADMIN — ACETAMINOPHEN 975 MG: 325 TABLET, FILM COATED ORAL at 16:24

## 2021-09-24 RX ADMIN — PROPOFOL 50 MG: 10 INJECTION, EMULSION INTRAVENOUS at 14:29

## 2021-09-24 RX ADMIN — MIDAZOLAM 1 MG: 1 INJECTION INTRAMUSCULAR; INTRAVENOUS at 11:00

## 2021-09-24 RX ADMIN — PROPOFOL 200 MG: 10 INJECTION, EMULSION INTRAVENOUS at 11:09

## 2021-09-24 RX ADMIN — FENTANYL CITRATE 25 MCG: 50 INJECTION, SOLUTION INTRAMUSCULAR; INTRAVENOUS at 11:27

## 2021-09-24 RX ADMIN — TRANEXAMIC ACID 1950 MG: 650 TABLET ORAL at 08:57

## 2021-09-24 RX ADMIN — SODIUM CHLORIDE, POTASSIUM CHLORIDE, SODIUM LACTATE AND CALCIUM CHLORIDE: 600; 310; 30; 20 INJECTION, SOLUTION INTRAVENOUS at 13:47

## 2021-09-24 ASSESSMENT — MIFFLIN-ST. JEOR: SCORE: 1606.38

## 2021-09-24 NOTE — OP NOTE
SURGERY DATE: 9/24/2021    PREOPERATIVE DIAGNOSIS: Osteoarthritis of Left knee.     POSTOPERATIVE DIAGNOSIS: Osteoarthritis of Left knee.     PROCEDURE PERFORMED: Left total knee arthroplasty.     STAFF: Dr. Mario Wallace    ASSISTANT: Forrest Brown DO, fellow; Genet Sy MD, Orthopaedic Surgery PGY-4.     ANESTHESIA: General    ESTIMATED BLOOD LOSS: 100cc    TOURNIQUET TIME: 110 min    OPERATIVE FINDINGS:  End stage arthrosis of the knee. Patella appropriate for patellar retention.     COMPLICATIONS: None apparent    COMPONENTS USED: Biomet Vanguard femoral size 65mm, tibial tray size 71mm, CR lipped poly insert insert size 14mm x 71/75 mm, all-poly button size 34mm x 9 mm.    OPERATIVE INDICATIONS:  The patient has a long history of debilitating pain secondary to ostearthritis of the knee.  Despite comprehensive non-operative management these symptoms continued to interfere with activities of daily living.  After discussion of further treatment options including the risks and benefits that patient elected to proceed with a total knee.    DESCRIPTION OF PROCEDURE: Patient was placed on the operating table in the supine position under general anesthesia. The operated knee was prepped and draped in standard manner. The tourniquet was inflated after ensanguination. The knee was exposed via a standard anterior incision with medial arthrotomy through the quadriceps tendon.  The distal femoral cut was performed in 5 degrees of valgus using an intramedullary guide removing 10 mm of bone. The proximal tibial cut was performed perpendicular to the long axis of the tibia using an external guide.  Osteophytes were removed and the collateral ligaments were balanced and released as necessary given any existing deformity.  The tensioning device was used to measure the extension gap at 40 lbs of tension.  Flexion gap was measured at the same tension force and the size of the femoral component was determined while maintaining the  same flexion gap for isometricity of the collateral ligaments.  The 4-in-1 cutting block for the selected femoral size was impacted onto the femur. The anterior, posterior and chamfer cuts were now made and the femoral component was placed in trial position. A trial reduction was performed.  The knee was found to be stable with balanced collateral ligaments throughout the entire range of motion.  The tibial component rotation was confirmed and marked.  Femoral component was removed and the tibial tray was used to prepare the tibial surface by using the punch for the tibial keel and winged flange.  I now proceeded with preparation of the patella by performing a cut of the articular surface after measuring the patellar thickness in order to restore the anatomic thickness. Trial components were again placed to ensure proper patellofemoral kinematics.  A lateral release was was not necessary.  The bony surface of the patella and tibia were drilled with an 1/8 inch drill bit to enhance cement interdigitation with the bone.  Each of the real components were now cemented in place after pulsatile lavage and cleansing of the bony surfaces. Excess cement was removed.  A 50 cc dose of the anesthetic cocktail using bupicaine, ketorolac, and morphine was injected into the posterior capsule, medial and lateral gutters with care taken to avoid intravascular injection.  The tibial polyethelene insert was locked in place.  An additional 50 cc dose of the anesthetic cocktail was injected into the quadriceps tendon and muscle tissue.    Wound closure was performed with standard technique using 2-0 vicryl running suture for the fat pad and synovium, interrupted figure-of-8 1-ethibond sutures for arthrotomy and quadriceps, 2-0 vicryl for subcutaneous tissue, and 3-0 PDS running subcuticular skin closure.  Exofin, steristrips, silverlon, and tegederm were applied. Gauze and ace wrap were then applied.   Postoperative plan will be  weightbearing as tolerated, ancef for 24 hours, aspirin 162mg x 4 weeks, follow up in 4 weeks.    Genet Sy MD  Orthopaedic Surgery PGY-4

## 2021-09-24 NOTE — OR NURSING
Pt continues with periods of apnea/obstruction needing stimulation to recover from. Pt will drop sats to 70's.  Dr Barfield paged and plan is to call RT and have pt fitted for a CPAP so she can rest comfortably without needing to be re-awoken by nursing to stimulate to breathe.    RT came to assess and attempted to place pt on CPAP.  Pt not taking breaths when sleeping so BiPAP was initiated at 1800.  Rate of 12. 10/5 at 30% O2.  Pt does wake when aroused and is able to participate in conversations and take some PO but becomes weary quickly and falls asleep.

## 2021-09-24 NOTE — BRIEF OP NOTE
[facility]    Brief Operative Note    Pre-operative diagnosis: Left knee pain [M25.562]  Post-operative diagnosis Same as pre-operative diagnosis    Procedure: Procedure(s):  Left total knee arthroplasty  Surgeon: Surgeon(s) and Role:     * Mario Wallace MD - Primary     * Criss Sy MD - Resident - Assisting     Anesthesia: General   Estimated blood loss: 100cc  Drains: None  Specimens: * No specimens in log *  Findings:   See operative report for details.  Complications: None.    Implants:   Implant Name Type Inv. Item Serial No.  Lot No. LRB No. Used Action   BONE CEMENT SIMPLEX FULL DOSE 6191-1-001 - [id number] Cement, Bone BONE CEMENT SIMPLEX FULL DOSE 6191-1-001  [employer] [id number] Left 2 Implanted   IMP COMP TIBIAL KNEE ASCENT 71MM [id number] - [id number] Total Joint Component/Insert IMP COMP TIBIAL KNEE ASCENT 71MM [id number]  [employer] [id number] Left 1 Implanted   IMP COMP FEMORAL VANGARD BIOM LT 65MM [id number] - [id number] Total Joint Component/Insert IMP COMP FEMORAL VANGARD BIOM LT 65MM [id number]  [employer] [id number] Left 1 Implanted   IMP COMP PATELLA BIOM ARCM MED 34MM [id number] - [id number] Total Joint Component/Insert IMP COMP PATELLA BIOM ARCM MED 34MM [id number]  [employer] [id number] Left 1 Implanted   IMP TIBIAL BEARING CR-LIPPED VANGUARD BIOM [id number]/75 [id number] - [id number] Total Joint Component/Insert IMP TIBIAL BEARING CR-LIPPED VANGUARD BIOM [id number]  [employer] [id number] Left 1 Implanted     Post-Op Plan:  Ortho Primary  Activity: Up with assist and assistive devices as needed until independent.   Weight bearing status: WBAT  ROM: ROMAT  Antibiotics: Cefazolin x 24 hours  Diet: Begin with clear fluids and progress diet as tolerated. Bowel regimen. Anti-emetics PRN.    DVT prophylaxis: Mechanical while in hospital with Lovenox x 2 weeks, followed by aspirin x 2 weeks  Elevation: Elevate heels off of  bed on pillows   Wound Care: Dressing to remain c/d/I x 7 days.    Pain management: Orals PRN, IV for breakthrough only  X-rays: AP Pelvis and Lateral operative knee in PACU.   Physical Therapy: Mobilization, ROM, ADL's  Occupational Therapy: ADL's  Labs: Hgb POD1  Consults: PT, OT. Hospitalist, appreciate assistance in caring for this patient throughout the perioperative period  Disposition: Pending progress with therapies, pain control on orals, and medical stability, anticipate discharge to Home vs TCU on POD #1-2.    Future Appointments   Date Time Provider Department Center   9/24/2021  3:15 PM Monae Schofield Pt, PT [facility] [place]   9/25/2021  8:00 AM Alma Rosa Cruz OT [facility] [place]   9/25/2021 11:00 AM Mc Howard, PT [facility] [place]   9/25/2021  1:00 PM Mc Howard, PT [facility] [place]   9/28/2021  1:30 PM Martita Milligan, PT [facility]   10/28/2021  8:30 AM Mario Wallace MD [facility]       Genet Sy MD  Orthopaedic Surgery PGY-4

## 2021-09-24 NOTE — ANESTHESIA PREPROCEDURE EVALUATION
Anesthesia Pre-Procedure Evaluation    Patient: Nicci Pemberton   MRN: [id number] : 1960        Preoperative Diagnosis: Left knee pain [M25.562]   Procedure : Procedure(s):  Left total knee arthroplasty     Past Medical History:   Diagnosis Date     Anemia      Cellulitis      Cirrhosis of liver (H) 2018     Gastroesophageal reflux disease      Heterozygous alpha 1-antitrypsin deficiency (H)      High hepatic iron concentration determined by biopsy of liver      Liver cirrhosis secondary to ANGULO (H)      Liver transplanted (H) 2019     Lymphedema      Osteoarthritis     knees     SBP (spontaneous bacterial peritonitis) (H) 2019 in [place]     Thrombocytopenia (H) 2020     Thyroid disease     Hypothyroid      Past Surgical History:   Procedure Laterality Date     ARTHROPLASTY KNEE Right 10/23/2020    Procedure: Right  total knee arthroplasty;  Surgeon: Mario Wallace MD;  [facility]     BENCH LIVER N/A 2019    Procedure: BACKBENCH PREPARATION, LIVER;  Surgeon: Mandeep Alford MD;  [facility]     COLONOSCOPY N/A 2018    Procedure: COLONOSCOPY;  colonoscopy;  Surgeon: Hugo Patel MD;  [facility]     ENDOSCOPIC RETROGRADE CHOLANGIOPANCREATOGRAM N/A 2/10/2020    Procedure: ENDOSCOPIC RETROGRADE CHOLANGIOPANCREATOGRAPHY with spinchterotomy;  Surgeon: Guru Rigoberto Canada MD;  [facility]     ENDOSCOPIC RETROGRADE CHOLANGIOPANCREATOGRAM N/A 2020    Procedure: ENDOSCOPIC RETROGRADE CHOLANGIOPANCREATOGRAPHY,Stent exchange and sludge removal;  Surgeon: Guru Rigoberto Canada MD;  [facility]     ENDOSCOPIC RETROGRADE CHOLANGIOPANCREATOGRAM N/A 2021    Procedure: ENDOSCOPIC RETROGRADE CHOLANGIOPANCREATOGRAPHY, SPINCTEROTOMY, DEBRIDE REMOVAL, STENT PLACEMENT;  Surgeon: Guru Rigoberto Canada MD;  [facility]     ENDOSCOPIC RETROGRADE CHOLANGIOPANCREATOGRAPHY, EXCHANGE TUBE/STENT N/A 3/4/2020     Procedure: ENDOSCOPIC RETROGRADE CHOLANGIOPANCREATOGRAPHY, WITH biliary dilarion, stent exchange, stone removal;  Surgeon: Guru Rigoberto Canada MD;  [facility]     ENDOSCOPIC RETROGRADE CHOLANGIOPANCREATOGRAPHY, EXCHANGE TUBE/STENT N/A 2021    Procedure: ENDOSCOPIC RETROGRADE CHOLANGIOPANCREATOGRAPHY WITH BILIARY STENT EXCHANGE, DILATION AND SLUDGE/STONE REMOVAL;  Surgeon: Guru Rigoberto Canada MD;  [facility]     ESOPHAGOSCOPY, GASTROSCOPY, DUODENOSCOPY (EGD), COMBINED N/A 10/31/2019    Procedure: Esophagogastroduodenoscopy, With Biopsy;  Surgeon: Nersisa Aranda MD;  [facility]     IR FEEDING TUBE PLACEMENT W FLUORO/MD  2019     IR FLUORO 0-1 HOUR  2019     IR LIVER BIOPSY PERCUTANEOUS  3/25/2021     IR LUMBAR PUNCTURE  2019     KNEE SURGERY  10/23/20     TRANSPLANT LIVER RECIPIENT  DONOR N/A 2019    Procedure: TRANSPLANT, LIVER, RECIPIENT,  DONOR;  Surgeon: Mandeep Alford MD;  [facility]     US PARACENTESIS  2019     US PARACENTESIS WITH ALBUMIN  2019      Allergies   Allergen Reactions     Ciprofloxacin Dizziness and Nausea     Food      Fruits with cores and pits  Apples     Sulfa Drugs Other (See Comments)     Swollen joints     Furosemide Rash      Social History     Tobacco Use     Smoking status: Former Smoker     Packs/day: 0.50     Years: 10.00     Pack years: 5.00     Types: Cigarettes     Start date: 2000     Quit date:      Years since quittin.9     Smokeless tobacco: Never Used   Substance Use Topics     Alcohol use: No     Comment: due to liver transplant,       Wt Readings from Last 1 Encounters:   21 101 kg (222 lb 10.6 oz)        Anesthesia Evaluation   Pt has had prior anesthetic. Type: General.    No history of anesthetic complications       ROS/MED HX  ENT/Pulmonary:     (+) DIXIE risk factors, obese, tobacco use, Past use,     Neurologic:  - neg neurologic  ROS     Cardiovascular:  - neg cardiovascular ROS     METS/Exercise Tolerance:     Hematologic:     (+) history of blood transfusion, previous transfusion reaction, Known PRBC Anitbodies: Yes, - Liver transplant,     Musculoskeletal: Comment: S/p right TKA 10/2020  (+) arthritis,     GI/Hepatic: Comment: S/p liver transplant 2019, s/p ERCP CBD stenosis      Renal/Genitourinary:     (+) renal disease, type: CRI, Pt does not require dialysis,     Endo:     (+) Obesity,     Psychiatric/Substance Use:  - neg psychiatric ROS     Infectious Disease:  - neg infectious disease ROS     Malignancy:       Other:               OUTSIDE LABS:  CBC:   Lab Results   Component Value Date    WBC 5.3 09/10/2021    WBC 5.8 08/26/2021    HGB 13.1 09/10/2021    HGB 13.4 08/26/2021    HCT 38.4 09/10/2021    HCT 38.9 08/26/2021     (L) 09/10/2021     (L) 08/26/2021     BMP:   Lab Results   Component Value Date     09/10/2021     08/26/2021    POTASSIUM 4.3 09/10/2021    POTASSIUM 4.5 08/26/2021    CHLORIDE 109 09/10/2021    CHLORIDE 107 08/26/2021    CO2 28 09/10/2021    CO2 26 08/26/2021    BUN 20 09/10/2021    BUN 22 08/26/2021    CR 1.11 (H) 09/10/2021    CR 1.05 (H) 08/26/2021    GLC 88 09/10/2021    GLC 92 08/26/2021     COAGS:   Lab Results   Component Value Date    PTT 36 12/06/2020    INR 0.91 05/12/2021    FIBR 682 (H) 12/10/2020     POC:   Lab Results   Component Value Date    BGM 98 05/12/2021    HCGS Negative 06/18/2018     HEPATIC:   Lab Results   Component Value Date    ALBUMIN 3.5 09/10/2021    PROTTOTAL 7.0 09/10/2021    ALT 27 09/10/2021    AST 36 09/10/2021    ALKPHOS 147 09/10/2021    BILITOTAL 1.8 (H) 09/10/2021    DOROTHEA 23 09/04/2019     OTHER:   Lab Results   Component Value Date    PH 7.46 (H) 09/11/2019    LACT 0.8 10/28/2020    A1C 5.0 08/18/2019    JACOB 8.4 (L) 09/10/2021    PHOS 3.8 07/28/2020    MAG 1.8 12/06/2020    LIPASE 46 (L) 05/12/2021    AMYLASE 44 05/12/2021    TSH 0.94  08/19/2021    T4 0.70 (L) 09/11/2019    CRP 51.6 (H) 12/10/2020     (H) 10/28/2020       Anesthesia Plan    ASA Status:  3   NPO Status:  NPO Appropriate    Anesthesia Type: General.     - Airway: LMA   Induction: Intravenous.   Maintenance: Balanced.        Consents    Anesthesia Plan(s) and associated risks, benefits, and realistic alternatives discussed. Questions answered and patient/representative(s) expressed understanding.     - Discussed with:  Patient         Postoperative Care    Pain management: Oral pain medications, Multi-modal analgesia.   PONV prophylaxis: Ondansetron (or other 5HT-3), Dexamethasone or Solumedrol     Comments:                Burton Lezama MD

## 2021-09-24 NOTE — OR NURSING
RT came to assess pt and we attempted to trial pt off of BiPAP onto CPAP.  Pt remains apneic when sleeping needing BiPAP support.  Pt remains on a rate of 12, 10/5, wuth 30% O2 and this keeps her sats %.  Lungs remain clear.  Dr Barfield to bedside as well x 2 to see pt.  Pt is very arousable and will infrequently wake to scratch nose/hair then fall immediately back to sleep with apnea.  Before pt had been placed on BiPAP capnography was placed on pt and she did not display hypercapnia with CO2 levels of 35-38.  Dr Barfield declined any labs at this time.

## 2021-09-24 NOTE — OR NURSING
Took over care from Sari WARD at 1615.  Pt continues with brief periods of apnea while resting.  Pt will awake and state she has excrutiating pain but then drift back to sleep.  Pt very sleepy and difficult to engage in conversation as she drifts off and closes eyes. Pt c/o nausea so 4 mg Zofran given IV with moderate results.  No narcotics given since 1545 (roughly 1.5 since time of this note) and pt continues with brief periods of apnea needing stimulation by nursing to pull out of.  Able to give Tylenol PO for pain.  Will continue to monitor.    Pt bladder scanned by Sari WARD for 518mls.  Pt very reluctant to be strait cath'd for urine.  Pt telling this nurse and previous nurse she really wants to urinate on her own.  Pt in a depends diaper.  Encouraged pt to urinate and she stated she did.  Re-bladder scanned for 350 mls.

## 2021-09-24 NOTE — ANESTHESIA PROCEDURE NOTES
Airway       Patient location during procedure: OR  Staff -        Anesthesiologist:  Ankur John MD       Resident/Fellow: Burton Lezama MD       Performed By: resident  Consent for Airway        Urgency: elective  Indications and Patient Condition       Indications for airway management: jessi-procedural         Mask difficulty assessment: 0 - not attempted    Final Airway Details       Final airway type: supraglottic airway    Supraglottic Airway Details        Type: LMA       Brand: Ambu AuraGain       LMA size: 5    Post intubation assessment        Placement verified by: capnometry, equal breath sounds and chest rise        Number of attempts at approach: 1       Number of other approaches attempted: 0       Secured with: pink tape       Ease of procedure: easy       Dentition: Intact and Unchanged

## 2021-09-24 NOTE — OR NURSING
Pt continues to c/o excruciating pain in left knee but drifts off to sleep after pain med is given and needs to be reminded to breathe with sats dropping. Will cont to give pain meds as I can.

## 2021-09-24 NOTE — ANESTHESIA CARE TRANSFER NOTE
Patient: Nicci Pemberton    Procedure(s):  Left total knee arthroplasty    Diagnosis: Left knee pain [M25.562]  Diagnosis Additional Information: No value filed.    Anesthesia Type:   General     Note:    Oropharynx: spontaneously breathing  Level of Consciousness: awake  Oxygen Supplementation: nasal cannula  Level of Supplemental Oxygen (L/min / FiO2): 6  Independent Airway: airway patency satisfactory and stable    Vital Signs Stable: post-procedure vital signs reviewed and stable  Report to RN Given: handoff report given  Patient transferred to: PACU    Handoff Report: Identifed the Patient, Identified the Reponsible Provider, Reviewed the pertinent medical history, Discussed the surgical course, Reviewed Intra-OP anesthesia mangement and issues during anesthesia, Set expectations for post-procedure period and Allowed opportunity for questions and acknowledgement of understanding      Vitals:  Vitals Value Taken Time   BP     Temp     Pulse 94 09/24/21 1447   Resp 10 09/24/21 1447   SpO2 88 % 09/24/21 1447   Vitals shown include unvalidated device data.    Electronically Signed By: Burton Lezama MD  September 24, 2021  2:48 PM

## 2021-09-24 NOTE — CONSULTS
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 Received 0.4 dilaudid, and 75 mcg Fentanly. No nausea, vomiting, chest pain, shortness of breath, lightheadedness or dizziness.             Past Medical History     Past Medical History:   Diagnosis Date     Anemia      Cellulitis      Cirrhosis of liver (H) 06/18/2018     Gastroesophageal reflux disease      Heterozygous alpha 1-antitrypsin deficiency (H)      High hepatic iron concentration determined by biopsy of liver      Liver cirrhosis secondary to ANGULO (H)      Liver transplanted (H) 08/18/2019     Lymphedema      Osteoarthritis     knees     SBP (spontaneous bacterial peritonitis) (H) 08/02/2019 8/1/19 in [place]     Thrombocytopenia (H) 11/2020     Thyroid disease     Hypothyroid          Past Surgical History     Past Surgical History:   Procedure Laterality Date     ARTHROPLASTY KNEE Right 10/23/2020    Procedure: Right  total knee arthroplasty;  Surgeon: Mario Wallace MD;  [facility]     BENCH LIVER N/A 8/18/2019    Procedure: BACKBENCH PREPARATION, LIVER;  Surgeon: Mandeep Alford MD;  [facility]     COLONOSCOPY N/A 6/22/2018    Procedure: COLONOSCOPY;  colonoscopy;  Surgeon: Hugo Patel MD;  [facility]     ENDOSCOPIC RETROGRADE CHOLANGIOPANCREATOGRAM N/A 2/10/2020    Procedure: ENDOSCOPIC RETROGRADE CHOLANGIOPANCREATOGRAPHY with spinchterotomy;  Surgeon: Guru Rigoberto Canada MD;   [facility]     ENDOSCOPIC RETROGRADE CHOLANGIOPANCREATOGRAM N/A 2020    Procedure: ENDOSCOPIC RETROGRADE CHOLANGIOPANCREATOGRAPHY,Stent exchange and sludge removal;  Surgeon: Guru Rigoberto Canada MD;  [facility]     ENDOSCOPIC RETROGRADE CHOLANGIOPANCREATOGRAM N/A 2021    Procedure: ENDOSCOPIC RETROGRADE CHOLANGIOPANCREATOGRAPHY, SPINCTEROTOMY, DEBRIDE REMOVAL, STENT PLACEMENT;  Surgeon: Guru Rigoberto Canada MD;  [facility]     ENDOSCOPIC RETROGRADE CHOLANGIOPANCREATOGRAPHY, EXCHANGE TUBE/STENT N/A 3/4/2020    Procedure: ENDOSCOPIC RETROGRADE CHOLANGIOPANCREATOGRAPHY, WITH biliary dilarion, stent exchange, stone removal;  Surgeon: Guru Rigoberto Canada MD;  [facility]     ENDOSCOPIC RETROGRADE CHOLANGIOPANCREATOGRAPHY, EXCHANGE TUBE/STENT N/A 2021    Procedure: ENDOSCOPIC RETROGRADE CHOLANGIOPANCREATOGRAPHY WITH BILIARY STENT EXCHANGE, DILATION AND SLUDGE/STONE REMOVAL;  Surgeon: Guru Rigoberto Canada MD;  [facility]     ESOPHAGOSCOPY, GASTROSCOPY, DUODENOSCOPY (EGD), COMBINED N/A 10/31/2019    Procedure: Esophagogastroduodenoscopy, With Biopsy;  Surgeon: Nerissa Aranda MD;  [facility]     IR FEEDING TUBE PLACEMENT W MOHAN/MD  2019     IR FLUORO 0-1 HOUR  2019     IR LIVER BIOPSY PERCUTANEOUS  3/25/2021     IR LUMBAR PUNCTURE  2019     KNEE SURGERY  10/23/20     TRANSPLANT LIVER RECIPIENT  DONOR N/A 2019    Procedure: TRANSPLANT, LIVER, RECIPIENT,  DONOR;  Surgeon: Mandeep Alford MD;  [facility]     US PARACENTESIS  2019     US PARACENTESIS WITH ALBUMIN  2019          Medications     All his current medications are reviewed in current medication section of [facility].  Home medications are reviewed.  Current Facility-Administered Medications   Medication     fentaNYL (PF) (SUBLIMAZE) injection 25 mcg     HYDROmorphone (PF) (DILAUDID) injection 0.2  mg     lactated ringers infusion     lactated ringers infusion     metoprolol (LOPRESSOR) injection 1-2 mg     ondansetron (ZOFRAN-ODT) ODT tab 4 mg    Or     ondansetron (ZOFRAN) injection 4 mg     ondansetron (ZOFRAN-ODT) ODT tab 4 mg    Or     ondansetron (ZOFRAN) injection 4 mg     oxyCODONE (ROXICODONE) tablet 5 mg     ropivacaine (NAROPIN) 300 mg, ketorolac (TORADOL) 30 mg, EPINEPHrine (ADRENALIN) 0.6 mg in sodium chloride 0.9 % 100 mL (ORTHO ANGI STANDARD DOSE)     ropivacaine (NAROPIN) 300 mg, ketorolac (TORADOL) 30 mg, EPINEPHrine (ADRENALIN) 0.6 mg in sodium chloride 0.9 % 100 mL (ORTHO ANGI STANDARD DOSE)        Allergies        Allergies   Allergen Reactions     Ciprofloxacin Dizziness and Nausea     Food      Fruits with cores and pits  Apples     Sulfa Drugs Other (See Comments)     Swollen joints     Furosemide Rash        Family History     Family History   Problem Relation Age of Onset     Diabetes Maternal Grandfather         Diagnosed at age 83     Cirrhosis No family hx of      Liver Cancer No family hx of      No Known Problems Mother      Restless Leg Syndrome Father      Mental Illness Sister      Alcoholism Brother      No Known Problems Son      Mental Illness Maternal Grandfather      Heart Failure Paternal Grandmother      No Known Problems Son      No Known Problems Maternal Grandmother      Breast Cancer Paternal Aunt           Social History     Social History     Socioeconomic History     Marital status:      Spouse name: Not on file     Number of children: Not on file     Years of education: Not on file     Highest education level: Not on file   Occupational History     Not on file   Tobacco Use     Smoking status: Former Smoker     Packs/day: 0.50     Years: 10.00     Pack years: 5.00     Types: Cigarettes     Start date: 2000     Quit date:      Years since quittin.9     Smokeless tobacco: Never Used   Substance and Sexual Activity     Alcohol use: No     Comment:  "due to liver transplant,      Drug use: Never     Sexual activity: Not Currently     Partners: Male     Birth control/protection: Post-menopausal   Other Topics Concern     Parent/sibling w/ CABG, MI or angioplasty before 65F 55M? Not Asked   Social History Narrative     Not on file     Social Determinants of Health     Financial Resource Strain:      Difficulty of Paying Living Expenses:    Food Insecurity:      Worried About Running Out of Food in the Last Year:      Ran Out of Food in the Last Year:    Transportation Needs:      Lack of Transportation (Medical):      Lack of Transportation (Non-Medical):    Physical Activity:      Days of Exercise per Week:      Minutes of Exercise per Session:    Stress:      Feeling of Stress :    Social Connections:      Frequency of Communication with Friends and Family:      Frequency of Social Gatherings with Friends and Family:      Attends Bahai Services:      Active Member of Clubs or Organizations:      Attends Club or Organization Meetings:      Marital Status:    Intimate Partner Violence:      Fear of Current or Ex-Partner:      Emotionally Abused:      Physically Abused:      Sexually Abused:         Review of Systems   A 10 point review of systems was taken and largely negative except for that which was stated above.      Physical Exam       Vital signs:    Blood pressure 137/68, pulse 93, temperature 97.5  F (36.4  C), temperature source Oral, resp. rate 11, height 1.575 m (5' 2.01\"), weight 108.8 kg (239 lb 13.8 oz), SpO2 97 %, not currently breastfeeding.  Estimated body mass index is 43.86 kg/m  as calculated from the following:    Height as of this encounter: 1.575 m (5' 2.01\").    Weight as of this encounter: 108.8 kg (239 lb 13.8 oz).      Intake/Output Summary (Last 24 hours) at 9/24/2021 1506  Last data filed at 9/24/2021 1440  Gross per 24 hour   Intake 1150 ml   Output --   Net 1150 ml      Constitutional: No icterus, no pallor  Eye: No icterus, no " pallor  Mouth/ENT: Normal oral mucosa  Cardiovascular: S1, S2 normal  Respiratory: B/L CTA  GI: Soft, NT, BS+  : No durbin's catheter.   Neurology: Intermittently sleepy but easily aroused.   Psych: Mood stable.   MSK:   Integumentary:   Heme/Lymph/Imm:        Laboratory and Imaging Studies     Laboratory and Imaging studies reviewed in the results review section of Epic. Pertinent studies are as below:    CMP  Recent Labs   Lab 09/24/21  0850 09/24/21  0815   GLC  --  106*   CR 1.16*  --    GFRESTIMATED 51*  --      CBC  Recent Labs   Lab 09/24/21  0850   *     INR  Recent Labs   Lab 09/24/21  0850   INR 0.91     Arterial Blood GasNo lab results found in last 7 days.       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 Expect it to improve as anesthetic agent,and opiate wears off.   Received 0.4 dilaudid, and 75 mcg Fentanly in PACU  - Wound cares, Dressings, Surgical pain management, DVT Prophylaxis  per primary team.   - Monitor anemia, hemodynamics, Input/Output  - Monitor Capnogrphy  - Encourage Incentive spirometry  - Laxatives for constipation prophylaxis    # Thrombocytopenia, Plts 128 (9/10/21)  - Monitor CBC in AM    # Hx of GERD, take H2B DOS   # Common bile duct stenosis treated with ERCP and stenting (5/021).    # Hepatic- Liver Cirrhosis 2/2 ANGULO s/p liver transplant 8/2019.  Treated earlier this year for rejection with high dose steroids.  Completed steroid taper ~one month ago.  Take  immunosuppression medication DOS.  On Cyclosporine, MMF  - Continue current regimen  - Check Cyclosporine level in AM  - Consult Transplant team for adjustments in dose     # CKD with GFR 54:  Creatinine level 1.16 on 09/24  - Avoid Nephrotoxins  - Renal dose medications  - Avoid hypotension    # Hypothyroidism: PTA on Levothyroxine  - Continue     # Lymphedema of LE, b/l.  Wears compression stockings.   - Continue     # Hx of (+) COVID 12/2020 and 5/2021:  Asymptomatic both times.                Josué Jimenez  Internal Medicine/Hospitalist  [facility]  [phone]

## 2021-09-25 ENCOUNTER — APPOINTMENT (OUTPATIENT)
Dept: PHYSICAL THERAPY | Facility: CLINIC | Age: 61
DRG: 470 | End: 2021-09-25
Attending: ORTHOPAEDIC SURGERY
Payer: COMMERCIAL

## 2021-09-25 ENCOUNTER — APPOINTMENT (OUTPATIENT)
Dept: OCCUPATIONAL THERAPY | Facility: CLINIC | Age: 61
DRG: 470 | End: 2021-09-25
Attending: ORTHOPAEDIC SURGERY
Payer: COMMERCIAL

## 2021-09-25 ENCOUNTER — APPOINTMENT (OUTPATIENT)
Dept: GENERAL RADIOLOGY | Facility: CLINIC | Age: 61
DRG: 470 | End: 2021-09-25
Attending: ORTHOPAEDIC SURGERY
Payer: COMMERCIAL

## 2021-09-25 LAB
ANION GAP SERPL CALCULATED.3IONS-SCNC: <1 MMOL/L (ref 3–14)
BUN SERPL-MCNC: 27 MG/DL (ref 7–30)
CALCIUM SERPL-MCNC: 8.1 MG/DL (ref 8.5–10.1)
CHLORIDE BLD-SCNC: 109 MMOL/L (ref 94–109)
CO2 SERPL-SCNC: 29 MMOL/L (ref 20–32)
CREAT SERPL-MCNC: 1.19 MG/DL (ref 0.52–1.04)
CYCLOSPORINE BLD LC/MS/MS-MCNC: 160 UG/L (ref 50–400)
GFR SERPL CREATININE-BSD FRML MDRD: 49 ML/MIN/1.73M2
GLUCOSE BLD-MCNC: 107 MG/DL (ref 70–99)
HGB BLD-MCNC: 10.8 G/DL (ref 11.7–15.7)
POTASSIUM BLD-SCNC: 4.5 MMOL/L (ref 3.4–5.3)
SODIUM SERPL-SCNC: 138 MMOL/L (ref 133–144)
TME LAST DOSE: NORMAL H
TME LAST DOSE: NORMAL H

## 2021-09-25 PROCEDURE — 73560 X-RAY EXAM OF KNEE 1 OR 2: CPT | Mod: LT

## 2021-09-25 PROCEDURE — 97535 SELF CARE MNGMENT TRAINING: CPT | Mod: GO

## 2021-09-25 PROCEDURE — 250N000012 HC RX MED GY IP 250 OP 636 PS 637: Performed by: INTERNAL MEDICINE

## 2021-09-25 PROCEDURE — 250N000013 HC RX MED GY IP 250 OP 250 PS 637: Performed by: STUDENT IN AN ORGANIZED HEALTH CARE EDUCATION/TRAINING PROGRAM

## 2021-09-25 PROCEDURE — 120N000002 HC R&B MED SURG/OB UMMC

## 2021-09-25 PROCEDURE — 97530 THERAPEUTIC ACTIVITIES: CPT | Mod: GO

## 2021-09-25 PROCEDURE — 97161 PT EVAL LOW COMPLEX 20 MIN: CPT | Mod: GP

## 2021-09-25 PROCEDURE — 99232 SBSQ HOSP IP/OBS MODERATE 35: CPT | Performed by: HOSPITALIST

## 2021-09-25 PROCEDURE — 97165 OT EVAL LOW COMPLEX 30 MIN: CPT | Mod: GO

## 2021-09-25 PROCEDURE — 250N000013 HC RX MED GY IP 250 OP 250 PS 637: Performed by: INTERNAL MEDICINE

## 2021-09-25 PROCEDURE — 80158 DRUG ASSAY CYCLOSPORINE: CPT | Performed by: INTERNAL MEDICINE

## 2021-09-25 PROCEDURE — 36415 COLL VENOUS BLD VENIPUNCTURE: CPT | Performed by: INTERNAL MEDICINE

## 2021-09-25 PROCEDURE — 97530 THERAPEUTIC ACTIVITIES: CPT | Mod: GP

## 2021-09-25 PROCEDURE — 97110 THERAPEUTIC EXERCISES: CPT | Mod: GP

## 2021-09-25 PROCEDURE — 80048 BASIC METABOLIC PNL TOTAL CA: CPT | Performed by: INTERNAL MEDICINE

## 2021-09-25 PROCEDURE — 85018 HEMOGLOBIN: CPT | Performed by: STUDENT IN AN ORGANIZED HEALTH CARE EDUCATION/TRAINING PROGRAM

## 2021-09-25 PROCEDURE — 258N000003 HC RX IP 258 OP 636: Performed by: STUDENT IN AN ORGANIZED HEALTH CARE EDUCATION/TRAINING PROGRAM

## 2021-09-25 PROCEDURE — 250N000011 HC RX IP 250 OP 636: Performed by: STUDENT IN AN ORGANIZED HEALTH CARE EDUCATION/TRAINING PROGRAM

## 2021-09-25 RX ORDER — ACETAMINOPHEN 325 MG/1
650 TABLET ORAL EVERY 4 HOURS PRN
Qty: 60 TABLET | Refills: 0 | Status: SHIPPED | OUTPATIENT
Start: 2021-09-27

## 2021-09-25 RX ORDER — ASPIRIN 81 MG/1
162 TABLET ORAL 2 TIMES DAILY
Status: DISCONTINUED | OUTPATIENT
Start: 2021-09-25 | End: 2021-09-28 | Stop reason: HOSPADM

## 2021-09-25 RX ORDER — ONDANSETRON 4 MG/1
4 TABLET, ORALLY DISINTEGRATING ORAL EVERY 6 HOURS PRN
Status: DISCONTINUED | OUTPATIENT
Start: 2021-09-25 | End: 2021-09-28 | Stop reason: HOSPADM

## 2021-09-25 RX ORDER — LIDOCAINE 40 MG/G
CREAM TOPICAL
Status: DISCONTINUED | OUTPATIENT
Start: 2021-09-25 | End: 2021-09-28 | Stop reason: HOSPADM

## 2021-09-25 RX ORDER — POLYETHYLENE GLYCOL 3350 17 G/17G
17 POWDER, FOR SOLUTION ORAL DAILY
Status: DISCONTINUED | OUTPATIENT
Start: 2021-09-26 | End: 2021-09-28 | Stop reason: HOSPADM

## 2021-09-25 RX ORDER — ONDANSETRON 2 MG/ML
4 INJECTION INTRAMUSCULAR; INTRAVENOUS EVERY 6 HOURS PRN
Status: DISCONTINUED | OUTPATIENT
Start: 2021-09-25 | End: 2021-09-28 | Stop reason: HOSPADM

## 2021-09-25 RX ORDER — AMOXICILLIN 250 MG
1 CAPSULE ORAL 2 TIMES DAILY
Status: DISCONTINUED | OUTPATIENT
Start: 2021-09-25 | End: 2021-09-28 | Stop reason: HOSPADM

## 2021-09-25 RX ORDER — POLYETHYLENE GLYCOL 3350 17 G/17G
17 POWDER, FOR SOLUTION ORAL DAILY
Qty: 7 PACKET | Refills: 0 | Status: SHIPPED | OUTPATIENT
Start: 2021-09-26 | End: 2021-11-29

## 2021-09-25 RX ORDER — BISACODYL 10 MG
10 SUPPOSITORY, RECTAL RECTAL DAILY PRN
Status: DISCONTINUED | OUTPATIENT
Start: 2021-09-25 | End: 2021-09-28 | Stop reason: HOSPADM

## 2021-09-25 RX ORDER — CALCIUM CARBONATE 500 MG/1
500 TABLET, CHEWABLE ORAL 4 TIMES DAILY PRN
Status: DISCONTINUED | OUTPATIENT
Start: 2021-09-25 | End: 2021-09-28 | Stop reason: HOSPADM

## 2021-09-25 RX ORDER — HYDROXYZINE HYDROCHLORIDE 25 MG/1
25 TABLET, FILM COATED ORAL EVERY 6 HOURS PRN
Qty: 30 TABLET | Refills: 0 | Status: SHIPPED | OUTPATIENT
Start: 2021-09-25 | End: 2021-10-04

## 2021-09-25 RX ORDER — PROCHLORPERAZINE MALEATE 5 MG
10 TABLET ORAL EVERY 6 HOURS PRN
Status: DISCONTINUED | OUTPATIENT
Start: 2021-09-25 | End: 2021-09-28 | Stop reason: HOSPADM

## 2021-09-25 RX ORDER — FAMOTIDINE 20 MG/1
20 TABLET, FILM COATED ORAL 2 TIMES DAILY
Status: DISCONTINUED | OUTPATIENT
Start: 2021-09-25 | End: 2021-09-28 | Stop reason: HOSPADM

## 2021-09-25 RX ORDER — LEVOTHYROXINE SODIUM 125 UG/1
125 TABLET ORAL EVERY MORNING
Status: DISCONTINUED | OUTPATIENT
Start: 2021-09-25 | End: 2021-09-28 | Stop reason: HOSPADM

## 2021-09-25 RX ORDER — HYDROMORPHONE HYDROCHLORIDE 2 MG/1
2-4 TABLET ORAL EVERY 4 HOURS PRN
Qty: 40 TABLET | Refills: 0 | Status: SHIPPED | OUTPATIENT
Start: 2021-09-25 | End: 2021-09-28

## 2021-09-25 RX ADMIN — DOCUSATE SODIUM AND SENNOSIDES 1 TABLET: 8.6; 5 TABLET ORAL at 08:19

## 2021-09-25 RX ADMIN — CYCLOSPORINE 100 MG: 100 CAPSULE, LIQUID FILLED ORAL at 21:25

## 2021-09-25 RX ADMIN — CEFAZOLIN SODIUM 2 G: 2 INJECTION, SOLUTION INTRAVENOUS at 05:55

## 2021-09-25 RX ADMIN — HYDROMORPHONE HYDROCHLORIDE 2 MG: 2 TABLET ORAL at 14:53

## 2021-09-25 RX ADMIN — HYDROMORPHONE HYDROCHLORIDE 2 MG: 2 TABLET ORAL at 10:43

## 2021-09-25 RX ADMIN — FAMOTIDINE 20 MG: 20 TABLET ORAL at 08:19

## 2021-09-25 RX ADMIN — CYCLOSPORINE 100 MG: 100 CAPSULE, LIQUID FILLED ORAL at 09:33

## 2021-09-25 RX ADMIN — FAMOTIDINE 20 MG: 20 TABLET ORAL at 20:02

## 2021-09-25 RX ADMIN — HYDROMORPHONE HYDROCHLORIDE 2 MG: 2 TABLET ORAL at 17:19

## 2021-09-25 RX ADMIN — HYDROMORPHONE HYDROCHLORIDE 4 MG: 2 TABLET ORAL at 20:02

## 2021-09-25 RX ADMIN — HYDROMORPHONE HYDROCHLORIDE 4 MG: 2 TABLET ORAL at 23:41

## 2021-09-25 RX ADMIN — ACETAMINOPHEN 975 MG: 325 TABLET, FILM COATED ORAL at 14:53

## 2021-09-25 RX ADMIN — ACETAMINOPHEN 975 MG: 325 TABLET, FILM COATED ORAL at 23:41

## 2021-09-25 RX ADMIN — ASPIRIN 162 MG: 81 TABLET, COATED ORAL at 20:02

## 2021-09-25 RX ADMIN — MYCOPHENOLATE MOFETIL 500 MG: 250 CAPSULE ORAL at 21:25

## 2021-09-25 RX ADMIN — DOCUSATE SODIUM AND SENNOSIDES 1 TABLET: 8.6; 5 TABLET ORAL at 20:01

## 2021-09-25 RX ADMIN — MYCOPHENOLATE MOFETIL 500 MG: 250 CAPSULE ORAL at 09:33

## 2021-09-25 RX ADMIN — LEVOTHYROXINE SODIUM 125 MCG: 125 TABLET ORAL at 08:19

## 2021-09-25 RX ADMIN — ONDANSETRON 4 MG: 4 TABLET, ORALLY DISINTEGRATING ORAL at 09:41

## 2021-09-25 RX ADMIN — ASPIRIN 162 MG: 81 TABLET, COATED ORAL at 08:19

## 2021-09-25 RX ADMIN — SODIUM CHLORIDE, POTASSIUM CHLORIDE, SODIUM LACTATE AND CALCIUM CHLORIDE: 600; 310; 30; 20 INJECTION, SOLUTION INTRAVENOUS at 11:49

## 2021-09-25 RX ADMIN — ACETAMINOPHEN 975 MG: 325 TABLET, FILM COATED ORAL at 08:19

## 2021-09-25 ASSESSMENT — ACTIVITIES OF DAILY LIVING (ADL): IADL_COMMENTS: HAS ASSIST WITH IADLS

## 2021-09-25 NOTE — ANESTHESIA POSTPROCEDURE EVALUATION
Patient: Nicci Pemberton    Procedure(s):  Left total knee arthroplasty    Diagnosis:Left knee pain [M25.562]  Diagnosis Additional Information: No value filed.    Anesthesia Type:  General    Note:  Disposition: Inpatient   Postop Pain Control: Uneventful            Sign Out: Well controlled pain   PONV: No   Neuro/Psych: Uneventful            Sign Out: Acceptable/Baseline neuro status   Airway/Respiratory:             Sign Out: O2 supplementation               Oxygen: Nasal Cannula (Patient required BiPAP in PACU due to apnea with desaturation. Denies sleep apnea, does snore. [place] go [place] [facility] [place] for possible undiagnosed sleep apnea.)   CV/Hemodynamics: Uneventful            Sign Out: Acceptable CV status; No obvious hypovolemia; No obvious fluid overload   Other NRE:    DID A NON-ROUTINE EVENT OCCUR? YES    Event details/Postop Comments:  Apnea noted when sleeping. Improved with BiPAP. No known history of sleep apnea but does snore. Intermittent apnea while sleeping was stable on BiPAP. Concern for undiagnosed sleep apnea.           Last vitals:  Vitals Value Taken Time   /71 09/24/21 2200   Temp 36.3  C (97.3  F) 09/24/21 1812   Pulse 79 09/24/21 2211   Resp 17 09/24/21 2211   SpO2 97 % 09/24/21 2211   Vitals shown include unvalidated device data.    Patient Vitals for the past 24 hrs:   BP Temp Temp src Pulse Resp SpO2 Height Weight   09/24/21 2200 118/71 -- -- 71 13 100 % -- --   09/24/21 2145 113/61 -- -- 75 18 97 % -- --   09/24/21 2045 113/61 -- -- 76 11 99 % -- --   09/24/21 2030 120/64 -- -- 77 12 99 % -- --   09/24/21 2000 121/67 -- -- 73 9 99 % -- --   09/24/21 1830 130/74 -- -- 87 -- 98 % -- --   09/24/21 1815 (!) 140/77 -- -- 80 12 100 % -- --   09/24/21 1812 -- 36.3  C (97.3  F) Temporal 77 10 100 % -- --   09/24/21 1800 130/68 -- -- 79 11 100 % -- --   09/24/21 1745 130/80 -- -- 81 8 94 % -- --   09/24/21 1730 134/75 -- -- 72 8 100 % -- --   09/24/21 1715 131/68 -- -- 71 12  "100 % -- --   09/24/21 1700 135/78 -- -- 80 30 90 % -- --   09/24/21 1645 -- -- -- 66 12 98 % -- --   09/24/21 1615 128/68 36.6  C (97.8  F) Oral 89 14 98 % -- --   09/24/21 1600 117/65 -- -- 82 12 93 % -- --   09/24/21 1545 138/74 -- -- 80 8 94 % -- --   09/24/21 1530 (!) 149/87 -- -- 80 11 98 % -- --   09/24/21 1515 134/76 -- -- 84 14 96 % -- --   09/24/21 1500 130/70 -- -- 86 11 98 % -- --   09/24/21 1447 137/68 36.4  C (97.5  F) Oral 93 11 97 % -- --   09/24/21 0758 (!) 118/90 36.8  C (98.2  F) Oral 83 15 99 % 1.575 m (5' 2.01\") 108.8 kg (239 lb 13.8 oz)       Electronically Signed By: Jane Barfield MD  September 24, 2021  10:13 PM  "

## 2021-09-25 NOTE — PHARMACY-ADMISSION MEDICATION HISTORY
Admission Medication History Completed by Pharmacy    See [facility] Admission Navigator for allergy information, preferred outpatient pharmacy, prior to admission medications and immunization status.     Medication History Sources:     Patient    surescripts fill history     Changes made to PTA medication list (reason):    Added: None    Deleted: None    Changed: None    Additional Information:    Double checked patient's current immunosuppression regimen is as below (cyclosporine 100 mg BID and mycophenolate 500 mg BID)    Prior to Admission medications    Medication Sig Last Dose Taking? Auth Provider   Cholecalciferol (VITAMIN D-3) 25 MCG (1000 UT) CAPS Take 1,000 Units by mouth 2 times daily Past Week at Unknown time Yes Mandeep Alford MD   cycloSPORINE modified (GENERIC EQUIVALENT) 25 MG capsule Take 4 capsules (100 mg) by mouth every morning AND 4 capsules (100 mg) every evening. 9/24/2021 at 0700 Yes Leventhal, Thomas Michael, MD   famotidine (PEPCID) 20 MG tablet Take 1 tablet (20 mg) by mouth 2 times daily 9/24/2021 at Unknown time Yes Mandeep Alford MD   levothyroxine (SYNTHROID/LEVOTHROID) 125 MCG tablet Take 125 mcg by mouth every morning  9/24/2021 at Unknown time Yes Abstract, Provider   mycophenolate (GENERIC EQUIVALENT) 250 MG capsule Take 2 capsules (500 mg) by mouth 2 times daily 9/24/2021 at 0700 Yes Leventhal, Thomas Michael, MD       Date completed: 09/25/21    Medication history completed by: Doug Johnson [facility]

## 2021-09-25 NOTE — OR NURSING
Pt doing well. Pt has been sleeping or talking on the phone to family members since I took over. Pt is currently on 3L NC sating 100%. ETCO2 not accurate d/t pt being a mouth breather. Writer can observe good chest rise and fall with respirations. Pt pain is tolerable at this time. Plan will be to medicate with po pain meds as pt tolerates.    Pt spoke with  and he declined any further updates for tonight. Awaiting Rn from floor to call for report.

## 2021-09-25 NOTE — PROGRESS NOTES
"[facility], [place]   Internal Medicine Daily Note         Interval History/Events     No events overnight.   She reports a higher amount of pain at the moment due to just ambulating to use the bathroom.   Her appetite is poor, states she is focusing on pain and getting well.  Ambulating to bathroom. No BM yet.   Afebrile. Denies chills, CP, SOB, N/V/D.         Review of Systems        4 point ROS including Respiratory, CV, GI and , other than that noted above is negative      Medications   I have reviewed current medications  in the \"current medication\" section of Epic.  Relevant changes include:     Physical Exam       Vital signs:    Blood pressure 109/68, pulse 81, temperature 97.3  F (36.3  C), temperature source Oral, resp. rate 10, height 1.575 m (5' 2.01\"), weight 108.8 kg (239 lb 13.8 oz), SpO2 100 %, not currently breastfeeding.  Estimated body mass index is 43.86 kg/m  as calculated from the following:    Height as of this encounter: 1.575 m (5' 2.01\").    Weight as of this encounter: 108.8 kg (239 lb 13.8 oz).      Intake/Output Summary (Last 24 hours) at 9/25/2021 0800  Last data filed at 9/25/2021 0400  Gross per 24 hour   Intake 1200 ml   Output 500 ml   Net 700 ml        Gen: Well Appearing. In NAD.  HEENT: NC/AT, EOMI, Anicteric Sclera  Neck: Supple  CV: Normal S1S2, Regular rhythm, normal rate  Lungs: Clear to ascultation b/l  Abd: Soft, NT/ND, +BS  Extremties:  Left knee in dressing. No LE Edema b/l   Skin: No rashes on exposed skin  Neurologic: AAOx3     Laboratory and Imaging Studies     I have reviewed  laboratory and imaging studies in the Epic. Pertinent findings are as below:    BMP  Recent Labs   Lab 09/25/21  0548 09/24/21  1903 09/24/21  0850 09/24/21  0815     --   --   --    POTASSIUM 4.5  --   --   --    CHLORIDE 109  --   --   --    JACOB 8.1*  --   --   --    CO2 29  --   --   --    BUN 27  --   --   --    CR 1.19*  --  1.16*  --    * 102*  " --  106*     CBC  Recent Labs   Lab 09/25/21  0548 09/24/21  0850   HGB 10.8*  --    PLT  --  142*     INR  Recent Labs   Lab 09/24/21  0850   INR 0.91     LFTsNo lab results found in last 7 days.   PANCNo lab results found in last 7 days.        Impression/Plan      Nicci Pemberton is a 60 YO Female with history of liver cirrhosis 2/2 ANGULO s/p liver transplant (8/2019) on immunosuppressive medications, Obesity, thrombocytopenia, degenerative joint disease s/p right RKA 10/2020, hypothyroidism, former smoker, b/l lower extremity lymphadema, CKD and GERD who was admitted and now s/p L TKA on 9/24/2021    S/p L TKA   - POD1  - Care per primary; Wound care, Pain Management, DVT ppx  - Bowel regimen   - PT/OT    Hx of Liver Cirrhosis 2/2 ANGULO s/p liver transplant 8/2019  Thrombocytpenia  - Hx of rejection treated with high dose steroids.  - Continue Cyclosporine, MWF & Mycophenolate twice baldev  - Follow up outpatient team at discharge    CKD   - Renal function is stable    Hypothyrodism  - Continue levothyroxine     Diet: Regular  DVT Prophylaxis: Per primay  Code Status: Full Code      Expected Discharge/Disposition: Per Primary     Pt's care was discussed with bedside RN, patient and  during Care Team Rounds.       Codie Turner MD  Hospitalist ( Internal medicine)  Pager: [phone]

## 2021-09-25 NOTE — PLAN OF CARE
"  VS:   Arrived to unit at midnight from PACU.Alert,well orientated.  BP (!) 169/84 (BP Location: Left arm)   Pulse 88   Temp 98.4  F (36.9  C) (Oral)   Resp 12   Ht 1.575 m (5' 2.01\")   Wt 108.8 kg (239 lb 13.8 oz)   SpO2 95%   BMI 43.86 kg/m     LS clear.IS using as reinforced.Capno readings WNL at 3lpm via NC.Mouth breathing at times.  Placed on telemonitor,NSR.     Output:   Was already wet on her depends on arrival to unit.Bedpan was offered,and did overfill.PVR was 120.Voided again at 0430.  Not passing gas yet.LBM 2 days ago.     Activity:   Not OOB yet.Able to roll on her sides with staff assist.   Skin: L knee UTV,covered with drsg and acewrapped.  Pre-existing surgical incisional scars on R chest(Liver Tx 2019)  And R knee, all healed well.  Blanchable redness on bilateral groin area.  Sacrum has protective mepilex drsg in place.   Pain:   Achy L knee pain.Was given scheduled tylenol and low dose of oral dilaudid 2mg.Ice pack refreshed.   Neuro/CMS:   Denies any numbness/tingling sensation.  Dorsi/plantarlexion on L foot moderate.  Lifting L leg up is limited from pain on movement.   Dressing(s):   CDI.   Diet:   Regular.Tolerarating water with no overt symptom of aspiration noted.   LDA:   PIV nargis L forearm with IVF LR at 75ml/hr.   Equipment:   Capno machine,PCDs,Telemonitor.Bed alarm.  Bedpan.Cane(her own) in room.   Plan:   Sleeping in between cares.Does snore and mouth breathes at times.Tolerating oxygen at 3lpm via NC for now.Will attempt to lessen narcotic and see if can taper off oxygen.   Additional Info:         "

## 2021-09-25 NOTE — PROGRESS NOTES
09/25/21 1300   Quick Adds   Type of Visit Initial Occupational Therapy Evaluation   Living Environment   People in home spouse   Current Living Arrangements house   Home Accessibility stairs to enter home;stairs within home   Number of Stairs, Main Entrance 2   Number of Stairs, Within Home, Primary 6   Transportation Anticipated family or friend will provide   Living Environment Comments  avail to assist. Has taller toilets. Tub/shower with suction grab bar and shower chair.    Self-Care   Usual Activity Tolerance moderate   Current Activity Tolerance fair   Regular Exercise No   Equipment Currently Used at Home cane, straight;grab bar, tub/shower;raised toilet seat;shower chair   Instrumental Activities of Daily Living (IADL)   IADL Comments has assist with IADLs   Disability/Function   Hearing Difficulty or Deaf no   Wear Glasses or Blind no   Concentrating, Remembering or Making Decisions Difficulty no   Difficulty Communicating no   Difficulty Eating/Swallowing no   Walking or Climbing Stairs Difficulty no   Walking or Climbing Stairs ambulation difficulty, requires equipment   Dressing/Bathing Difficulty yes   Dressing/Bathing bathing difficulty, requires equipment   Toileting issues no   Doing Errands Independently Difficulty (such as shopping) no   Fall history within last six months no   General Information   Onset of Illness/Injury or Date of Surgery 09/24/21   Referring Physician Criss Sy MD   Patient/Family Therapy Goal Statement (OT) Did not endorse, in agreement with OT related goals.    Additional Occupational Profile Info/Pertinent History of Current Problem s/p L TKA   Existing Precautions/Restrictions fall;other (see comments)  (no pillow resting under L knee)   Left Lower Extremity (Weight-bearing Status) weight-bearing as tolerated (WBAT)   Cognitive Status Examination   Orientation Status orientation to person, place and time   Affect/Mental Status (Cognitive) WFL    Follows Commands WFL   Visual Perception   Visual Impairment/Limitations WNL   Sensory   Sensory Quick Adds No deficits were identified   Pain Assessment   Patient Currently in Pain Yes, see Vital Sign flowsheet   Range of Motion Comprehensive   General Range of Motion no range of motion deficits identified   Strength Comprehensive (MMT)   General Manual Muscle Testing (MMT) Assessment no strength deficits identified   Coordination   Upper Extremity Coordination No deficits were identified   Bed Mobility   Comment (Bed Mobility) Supine<>sit with SBA   Sit-Stand Transfer   Sit-Stand Day (Transfers) contact guard;set up;verbal cues   Assistive Device (Sit-Stand Transfers) walker, front-wheeled   Toilet Transfer   Type (Toilet Transfer) stand pivot/stand step   Day Level (Toilet Transfer) contact guard   Assistive Device (Toilet Transfer) commode chair   Lower Body Dressing Assessment   Day Level (Lower Body Dressing) contact guard assist  (leaning forward when sitting EOB )   Position (Lower Body Dressing) edge of bed sitting   Toileting   Day Level (Toileting) contact guard assist   Assistive Devices (Toileting) commode chair   Clinical Impression   Criteria for Skilled Therapeutic Interventions Met (OT) yes   OT Diagnosis Decreased functional mobility and ADLs   OT Problem List-Impairments impacting ADL range of motion (ROM);strength;pain   Assessment of Occupational Performance 3-5 Performance Deficits   Identified Performance Deficits dressing, bathing, toileting, transfers, home mgmt   Planned Therapy Interventions (OT) ADL retraining;transfer training   Clinical Decision Making Complexity (OT) low complexity   Therapy Frequency (OT) Daily   Predicted Duration of Therapy 3 days   Anticipated Equipment Needs Upon Discharge (OT)   (TBD)   Risk & Benefits of therapy have been explained care plan/treatment goals reviewed;patient   OT Discharge Planning    OT Discharge  Recommendation (DC Rec) Home with assist   OT Rationale for DC Rec Anticipate patient will progress to discharge home with assist from .    OT Brief overview of current status  SBA bed mobility, CGA bed<>BSC transfer using FWW. Reported feeling unwell today.    Total Evaluation Time (Minutes)   Total Evaluation Time (Minutes) 8

## 2021-09-25 NOTE — OR NURSING
Dr Barfield to bedside to assess pt.  Pt is arousable and able to have a coherent conversation with this nurse and MD.  Pt asked about history of sleep apnea and she denied that she has been diagnosed with it.  She does state that she has been told she snores by .  MD discussed with pt that she may need CPAP/BiPAP while in hospital and should seek another sleep study at discharge.  Pt is able to hold a longer conversation that she could when she was assessed earlier.  Pt able to keep her sats at 100% on 2 L NC while awake. All other VSS. Will continue to monitor what happens when pt falls asleep.  Pt stating her pain is terrible but Dr Barfield declined to give any narcotic pain meds at this point due to pt's continued respiratory issues. Pt is able to sleep/rest comfortably despite pain with BiPAP support.    Pt called  and she and this nurse updated  about pt's condition.  Pt stated that no one on Wallace's team had called him to update him after surgery. Pt upset that he had not been called.  Apologized for this. Will find someone on the ortho team to update  ASAP.

## 2021-09-25 NOTE — PROGRESS NOTES
09/25/21 0940   Quick Adds   Type of Visit Initial PT Evaluation       Present no   Language English   Living Environment   People in home spouse   Current Living Arrangements house   Home Accessibility stairs to enter home;stairs within home   Number of Stairs, Main Entrance 2   Stair Railings, Main Entrance none   Number of Stairs, Within Home, Primary 6   Stair Railings, Within Home, Primary railings safe and in good condition;railings on both sides of stairs   Transportation Anticipated family or friend will provide   Self-Care   Usual Activity Tolerance moderate   Current Activity Tolerance fair   Regular Exercise No   Equipment Currently Used at Home cane, straight  (has cane and walker)   Disability/Function   Hearing Difficulty or Deaf no   Wear Glasses or Blind no   Concentrating, Remembering or Making Decisions Difficulty no   Difficulty Communicating no   Difficulty Eating/Swallowing no   Walking or Climbing Stairs Difficulty yes   Walking or Climbing Stairs ambulation difficulty, requires equipment   Dressing/Bathing Difficulty no   Dressing/Bathing bathing difficulty, requires equipment   Toileting issues no   Doing Errands Independently Difficulty (such as shopping) no   Fall history within last six months no   General Information   Onset of Illness/Injury or Date of Surgery 09/24/21   Referring Physician Criss Sy MD   Patient/Family Therapy Goals Statement (PT) Did not endorse   Pertinent History of Current Problem (include personal factors and/or comorbidities that impact the POC) s/p L TKA   Existing Precautions/Restrictions fall   Weight-Bearing Status - LUE full weight-bearing   Weight-Bearing Status - RUE full weight-bearing   Weight-Bearing Status - LLE weight-bearing as tolerated   Weight-Bearing Status - RLE full weight-bearing   General Observations Supine in bed upon arrival, agreeable to PT   Cognition   Orientation Status (Cognition) oriented x 3    Affect/Mental Status (Cognition) WNL   Follows Commands (Cognition) WNL   Pain Assessment   Patient Currently in Pain Yes, see Vital Sign flowsheet   Integumentary/Edema   Integumentary/Edema Comments Patient with normal post-surgical swelling present to L LE   Posture    Posture Forward head position;Protracted shoulders;Kyphosis   Posture Comments Mild to moderate sitting EOB and standing   Range of Motion (ROM)   ROM Comment Did not formally assess, tolerates heelslide on L to approximately 50 degrees with assist   Strength   Strength Comments Did not formally assess, demonstrates moderate quality contractions with supine exercises on L   Bed Mobility   Comment (Bed Mobility) Completes supine to sit transfer with light Rafa to L LE only   Transfers   Transfer Safety Comments Completes sit<>stand transfer with light Rafa and use of walker   Gait/Stairs (Locomotion)   Comment (Gait/Stairs) Takes a few steps forward and backward and to commode with CGA and use of walker   Balance   Balance Comments Independent sitting balance, CGA for standing balance with UE support from walker   Sensory Examination   Sensory Perception WNL   Clinical Impression   Criteria for Skilled Therapeutic Intervention yes, treatment indicated   PT Diagnosis (PT) impaired functional mobility   Influenced by the following impairments increased post-op pain, decreased L LE strength and ROM   Functional limitations due to impairments impaired bed mobility, transfers and ambulation   Clinical Presentation Stable/Uncomplicated   Clinical Presentation Rationale s/p L TKA, mobilizing well overall   Clinical Decision Making (Complexity) low complexity   Therapy Frequency (PT) 2x/day   Predicted Duration of Therapy Intervention (days/wks) 3 days   Planned Therapy Interventions (PT) balance training;bed mobility training;gait training;home exercise program;ROM (range of motion);stair training;strengthening;transfer training;progressive  activity/exercise;risk factor education;home program guidelines   Risk & Benefits of therapy have been explained evaluation/treatment results reviewed;care plan/treatment goals reviewed;risks/benefits reviewed;current/potential barriers reviewed   PT Discharge Planning    PT Discharge Recommendation (DC Rec) home with assist;home with home care physical therapy;home with outpatient physical therapy   PT Rationale for DC Rec PT: Currently may benefit from home PT for safety evaluation and progression but if unable to get in right away may look to OP PT for progression per protocol   PT Brief overview of current status  PT: Light Rafa to L LE for bed mobility, Rafa for transfers with walker, takes a few steps CGA using walker

## 2021-09-25 NOTE — PROGRESS NOTES
"Orthopedic Surgery Progress Note    Subjective:   No acute events overnight. Pain controlled. Tolerating clears. Voiding. Last BM prior to surgery. No numbness or tingling. Has not been out of bed since surgery.    Exam:  /68 (BP Location: Left arm)   Pulse 81   Temp 97.3  F (36.3  C) (Oral)   Resp 10   Ht 1.575 m (5' 2.01\")   Wt 108.8 kg (239 lb 13.8 oz)   SpO2 100%   BMI 43.86 kg/m    Gen: Awake, alert, NAD  Resp: breathing equal and non-labored  Extremities:    LLE: Dressing clean/dry/intact. Able to fire quad, TA, GSC, EHL, FHL. SILT to SP/DP/saphenous/sural/tibial nerves. Toes warm and well perfused.      Labs:    Recent Labs   Lab 09/25/21  0548 09/24/21  0850   HGB 10.8*  --    PLT  --  142*     Recent Labs   Lab 09/25/21  0548 09/24/21  1903 09/24/21  0850 09/24/21  0815     --   --   --    POTASSIUM 4.5  --   --   --    CHLORIDE 109  --   --   --    CO2 29  --   --   --    BUN 27  --   --   --    CR 1.19*  --  1.16*  --    * 102*  --  106*     Recent Labs   Lab 09/24/21  0850   INR 0.91   PTT 30       Assessment:   61 year old female s/p left TKA on 9/24/21 with Dr. Wallace. Doing well.    Plan for today:  - advance diet  - PT/OT  - Possible discharge pending PT/OT  - repeat left knee xrays    Plan:  Ortho Primary  Activity: Up with assist and assistive devices as needed until independent.   Weight bearing status: WBAT  ROM: ROMAT  Antibiotics: Cefazolin x 24 hours  Diet: Begin with clear fluids and progress diet as tolerated. Bowel regimen. Anti-emetics PRN.    DVT prophylaxis: Mechanical while in hospital, aspirin 162mg x 4 weeks  Elevation: Elevate heels off of bed on pillows   Wound Care: Dressing to remain c/d/I x 7 days.    Pain management: Orals PRN, IV for breakthrough only  Physical Therapy: Mobilization, ROM, ADL's  Occupational Therapy: ADL's  Consults: PT, OT. Hospitalist, appreciate assistance in caring for this patient throughout the perioperative period  Disposition: " Pending progress with therapies, pain control on orals, and medical stability, anticipate discharge to Home vs TCU on POD #1-2.            Future Appointments   Date Time Provider Department Center   9/24/2021  3:15 PM Monae Schofield Pt, PT [facility] [place]   9/25/2021  8:00 AM Alma Rosa Cruz OT [facility] [place]   9/25/2021 11:00 AM Mc Howard, PT [facility] [place]   9/25/2021  1:00 PM Mc Howard, PT [facility] [place]   9/28/2021  1:30 PM Martita Milligan, PT [facility]   10/28/2021  8:30 AM Mario Wallace MD [facility]         Genet Sy MD  Orthopaedic Surgery PGY-4

## 2021-09-25 NOTE — PLAN OF CARE
"      VS: /68 (BP Location: Left arm)   Pulse 88   Temp 98.5  F (36.9  C) (Oral)   Resp 16   Ht 1.575 m (5' 2.01\")   Wt 108.8 kg (239 lb 13.8 oz)   SpO2 98%   BMI 43.86 kg/m    Room air.   Output: Voiding spontaneously without difficulty on commode. Baseline incontinence at times. LBM 9/23 +flatus   Lungs: CTA   Activity: Up with SBA, walker and gait belt   Skin: Intact ex incision   Pain:   Pain intolerable at times. Dilaudid given with small relief. Pt hesitant to take narcotics.    Neuro/CMS:   A&Ox4, denies numbness and tingling   Dressing(s):   CDI   Diet:   Regular- tolerating ok- poor appetite due to pain   LDA:   R PIV infusing, 1L NC when sleeping.    Equipment:   Walker, commode,    Plan:   Continue to monitor and follow plan of care. Able to make needs known and call light within reach.   Additional Info:          "

## 2021-09-25 NOTE — OR NURSING
Received report and monitored patient for 30 min. No changes made to BiPAP settings or O2. occasional reminders needed for occasional periods of apnea. Blood glucose checked was 102. Report given to Sulma Lira RN  & Vee Templeton RN to resume patient's care.

## 2021-09-25 NOTE — OR NURSING
PACU to Inpatient Nursing Handoff    Patient Nicci Pemberton is a 61 year old female who speaks English.   Procedure Procedure(s):  Left total knee arthroplasty   Surgeon(s) Primary: Mario Wallace MD  Resident - Assisting: Criss Sy MD     Allergies   Allergen Reactions     Ciprofloxacin Dizziness and Nausea     Food      Fruits with cores and pits  Apples     Sulfa Drugs Other (See Comments)     Swollen joints     Furosemide Rash       Isolation  [unfilled]     Past Medical History   has a past medical history of Anemia, Cellulitis, Cirrhosis of liver (H) (06/18/2018), Gastroesophageal reflux disease, Heterozygous alpha 1-antitrypsin deficiency (H), High hepatic iron concentration determined by biopsy of liver, Liver cirrhosis secondary to ANGULO (H), Liver transplanted (H) (08/18/2019), Lymphedema, Osteoarthritis, SBP (spontaneous bacterial peritonitis) (H) (08/02/2019), Thrombocytopenia (H) (11/2020), and Thyroid disease.    Anesthesia General   Dermatome Level     Preop Meds TXA - time given: 0857   Nerve block Not applicable   Intraop Meds fentanyl (Sublimaze): 100 mcg total  hydromorphone (Dilaudid): 1 mg total  ondansetron (Zofran): last given at 1424   Local Meds Yes - Local Cocktail (morphine, ropivacaine, epinephrine, Toradol)   Antibiotics cefazolin (Ancef) - last given at 2225     Pain Patient Currently in Pain: sleeping, patient not able to self report   PACU meds  acetaminophen (Tylenol): 975 mg (total dose) last given at 1630   fentanyl (Sublimaze): 75 mcg (total dose) last given at 1526   hydromorphone (Dilaudid): .6 mg (total dose) last given at 1549   ondansetron (Zofran): 4 mg (total dose) last given at 1630   Cellcept and Cyclosporin at 2129   PCA / epidural No   Capnography Respiratory Monitoring (EtCO2): 32 mmHg   Telemetry ECG Rhythm: Normal sinus rhythm   Inpatient Telemetry Monitor Ordered? Yes        Labs Glucose Lab Results   Component Value Date     09/24/2021     GLC 88 09/10/2021    GLC 87 07/09/2021       Hgb Lab Results   Component Value Date    HGB 13.1 09/10/2021    HGB 12.6 07/09/2021       INR Lab Results   Component Value Date    INR 0.91 09/24/2021    INR 0.91 05/12/2021      PACU Imaging Completed     Wound/Incision Incision/Surgical Site 09/24/21 Left Knee (Active)   Incision Assessment UTV 09/24/21 2045   Anisha-Incision Assessment UTV 09/24/21 2030   Closure BLAIRE 09/24/21 2045   Incision Drainage Amount None 09/24/21 2045   Dressing Intervention Clean, dry, intact 09/24/21 2045   Number of days: 0      CMS        Equipment ice pack and BiPAP ([employer]-owned)   Other LDA       IV Access Peripheral IV 09/24/21 Anterior;Right Lower forearm (Active)   Site Assessment WDL 09/24/21 2030   Line Status Infusing 09/24/21 2030   Dressing Intervention New dressing  09/24/21 0932   Phlebitis Scale 0-->no symptoms 09/24/21 1447   Number of days: 0      Blood Products Not applicable  mL   Intake/Output Date 09/24/21 0700 - 09/25/21 0659   Shift 1542-2138 4949-7255 6478-4664 24 Hour Total   INTAKE   P.O.  50  50   I.V. 1150   1150   Shift Total(mL/kg) 1150(10.57) 50(0.46)  1200(11.03)   OUTPUT   Shift Total(mL/kg)       Weight (kg) 108.8 108.8 108.8 108.8      Drains / Forrester     Time of void PreOp Void Prior to Procedure: 0830 (09/24/21 0854)    PostOp      Diapered? Yes   Bladder Scan Bladder Scan Volume (mL): 518 ml (pt wearing a depend and wants to try to void herself) (09/24/21 1551)   PO 50 mL (09/24/21 1551)  tolerating sips     Vitals    B/P: 118/71  T: 97.3  F (36.3  C)    Temp src: Temporal  P:  Pulse: 71 (09/24/21 2200)          R: 13  O2:  SpO2: 100 %    O2 Device: Nasal cannula (09/24/21 2200)    Oxygen Delivery: 3 LPM (09/24/21 2200)    FiO2 (%):  (30) (09/24/21 1812)    Family/support present not present   Patient belongings  cane, pt belongings, black/white tote bag   Patient transported on bed   DC meds/scripts (obs/outpt) Not applicable   Inpatient Pain  Meds Released? Yes       Special needs/considerations seems to have sensitivity to IV narcotics.    Tasks needing completion None       Yany Vee RN  ASCOM [id number]

## 2021-09-25 NOTE — OR NURSING
Wil CARTAGENA stopped by from floor to assess pt.  He reordered her rejection meds which we will give in PACU while we are waiting for a bed to open on the 8th floor.  Pt spoke with MD and stated that in the past dilaudid has worked better for her than the oxycodone for pain.  She states she has less nausea and better pain control on dilaudid.  MD will switch pain meds to PO Dilaudid while on floor.  MD also reminded pt she needs to see a sleep  when she is discharged from hospital.     called and updated again.  Spoke with Dr Wallace in the OR and he said he will call  when he is finished with his current case.

## 2021-09-25 NOTE — PROGRESS NOTES
Medication reconciliation done for patient including transplant medicines  Also, as per patient's preference, changed oral Oxycodone to Oral Dialudid as this works better for pain control  Also note that patient is on BiPAP now and mentating well. Will likely need outpatient sleep study       Sania Moya MD  Hospitalist - [facility] ([facility] [place]  House Physician Rotating pager: [phone]

## 2021-09-26 ENCOUNTER — APPOINTMENT (OUTPATIENT)
Dept: PHYSICAL THERAPY | Facility: CLINIC | Age: 61
DRG: 470 | End: 2021-09-26
Attending: ORTHOPAEDIC SURGERY
Payer: COMMERCIAL

## 2021-09-26 LAB
ANION GAP SERPL CALCULATED.3IONS-SCNC: 3 MMOL/L (ref 3–14)
BASOPHILS # BLD AUTO: 0 10E3/UL (ref 0–0.2)
BASOPHILS NFR BLD AUTO: 0 %
BUN SERPL-MCNC: 20 MG/DL (ref 7–30)
CALCIUM SERPL-MCNC: 7.9 MG/DL (ref 8.5–10.1)
CHLORIDE BLD-SCNC: 108 MMOL/L (ref 94–109)
CO2 SERPL-SCNC: 28 MMOL/L (ref 20–32)
CREAT SERPL-MCNC: 1.05 MG/DL (ref 0.52–1.04)
EOSINOPHIL # BLD AUTO: 0.1 10E3/UL (ref 0–0.7)
EOSINOPHIL NFR BLD AUTO: 1 %
ERYTHROCYTE [DISTWIDTH] IN BLOOD BY AUTOMATED COUNT: 12.1 % (ref 10–15)
GFR SERPL CREATININE-BSD FRML MDRD: 57 ML/MIN/1.73M2
GLUCOSE BLD-MCNC: 108 MG/DL (ref 70–99)
HCT VFR BLD AUTO: 31.5 % (ref 35–47)
HGB BLD-MCNC: 10.6 G/DL (ref 11.7–15.7)
IMM GRANULOCYTES # BLD: 0.1 10E3/UL
IMM GRANULOCYTES NFR BLD: 1 %
LYMPHOCYTES # BLD AUTO: 0.8 10E3/UL (ref 0.8–5.3)
LYMPHOCYTES NFR BLD AUTO: 8 %
MCH RBC QN AUTO: 33.3 PG (ref 26.5–33)
MCHC RBC AUTO-ENTMCNC: 33.7 G/DL (ref 31.5–36.5)
MCV RBC AUTO: 99 FL (ref 78–100)
MONOCYTES # BLD AUTO: 1.3 10E3/UL (ref 0–1.3)
MONOCYTES NFR BLD AUTO: 13 %
NEUTROPHILS # BLD AUTO: 7.9 10E3/UL (ref 1.6–8.3)
NEUTROPHILS NFR BLD AUTO: 77 %
NRBC # BLD AUTO: 0 10E3/UL
NRBC BLD AUTO-RTO: 0 /100
PLATELET # BLD AUTO: 102 10E3/UL (ref 150–450)
POTASSIUM BLD-SCNC: 4.5 MMOL/L (ref 3.4–5.3)
RBC # BLD AUTO: 3.18 10E6/UL (ref 3.8–5.2)
SODIUM SERPL-SCNC: 139 MMOL/L (ref 133–144)
WBC # BLD AUTO: 10.2 10E3/UL (ref 4–11)

## 2021-09-26 PROCEDURE — 120N000002 HC R&B MED SURG/OB UMMC

## 2021-09-26 PROCEDURE — 80048 BASIC METABOLIC PNL TOTAL CA: CPT | Performed by: HOSPITALIST

## 2021-09-26 PROCEDURE — 250N000011 HC RX IP 250 OP 636: Performed by: STUDENT IN AN ORGANIZED HEALTH CARE EDUCATION/TRAINING PROGRAM

## 2021-09-26 PROCEDURE — 250N000013 HC RX MED GY IP 250 OP 250 PS 637

## 2021-09-26 PROCEDURE — 250N000012 HC RX MED GY IP 250 OP 636 PS 637: Performed by: INTERNAL MEDICINE

## 2021-09-26 PROCEDURE — 258N000003 HC RX IP 258 OP 636: Performed by: STUDENT IN AN ORGANIZED HEALTH CARE EDUCATION/TRAINING PROGRAM

## 2021-09-26 PROCEDURE — 97110 THERAPEUTIC EXERCISES: CPT | Mod: GP

## 2021-09-26 PROCEDURE — 99232 SBSQ HOSP IP/OBS MODERATE 35: CPT | Performed by: HOSPITALIST

## 2021-09-26 PROCEDURE — 85025 COMPLETE CBC W/AUTO DIFF WBC: CPT | Performed by: HOSPITALIST

## 2021-09-26 PROCEDURE — 250N000013 HC RX MED GY IP 250 OP 250 PS 637: Performed by: STUDENT IN AN ORGANIZED HEALTH CARE EDUCATION/TRAINING PROGRAM

## 2021-09-26 PROCEDURE — 250N000013 HC RX MED GY IP 250 OP 250 PS 637: Performed by: INTERNAL MEDICINE

## 2021-09-26 PROCEDURE — 97530 THERAPEUTIC ACTIVITIES: CPT | Mod: GP

## 2021-09-26 PROCEDURE — 36416 COLLJ CAPILLARY BLOOD SPEC: CPT | Performed by: HOSPITALIST

## 2021-09-26 RX ORDER — HYDROMORPHONE HYDROCHLORIDE 2 MG/1
4-6 TABLET ORAL
Status: DISCONTINUED | OUTPATIENT
Start: 2021-09-26 | End: 2021-09-26

## 2021-09-26 RX ORDER — METHOCARBAMOL 500 MG/1
500 TABLET, FILM COATED ORAL 4 TIMES DAILY
Status: DISCONTINUED | OUTPATIENT
Start: 2021-09-26 | End: 2021-09-28 | Stop reason: HOSPADM

## 2021-09-26 RX ORDER — LIDOCAINE 4 G/G
1 PATCH TOPICAL
Status: DISCONTINUED | OUTPATIENT
Start: 2021-09-26 | End: 2021-09-28 | Stop reason: HOSPADM

## 2021-09-26 RX ORDER — HYDROXYZINE HYDROCHLORIDE 50 MG/1
50 TABLET, FILM COATED ORAL EVERY 6 HOURS PRN
Status: DISCONTINUED | OUTPATIENT
Start: 2021-09-26 | End: 2021-09-28 | Stop reason: HOSPADM

## 2021-09-26 RX ORDER — HYDROMORPHONE HYDROCHLORIDE 2 MG/1
2-4 TABLET ORAL
Status: DISCONTINUED | OUTPATIENT
Start: 2021-09-26 | End: 2021-09-27

## 2021-09-26 RX ADMIN — HYDROMORPHONE HYDROCHLORIDE 4 MG: 2 TABLET ORAL at 03:52

## 2021-09-26 RX ADMIN — DOCUSATE SODIUM AND SENNOSIDES 1 TABLET: 8.6; 5 TABLET ORAL at 08:32

## 2021-09-26 RX ADMIN — METHOCARBAMOL 500 MG: 500 TABLET ORAL at 20:18

## 2021-09-26 RX ADMIN — HYDROMORPHONE HYDROCHLORIDE 4 MG: 2 TABLET ORAL at 08:32

## 2021-09-26 RX ADMIN — CYCLOSPORINE 100 MG: 100 CAPSULE, LIQUID FILLED ORAL at 21:26

## 2021-09-26 RX ADMIN — ACETAMINOPHEN 975 MG: 325 TABLET, FILM COATED ORAL at 15:30

## 2021-09-26 RX ADMIN — FAMOTIDINE 20 MG: 20 TABLET ORAL at 08:31

## 2021-09-26 RX ADMIN — DOCUSATE SODIUM AND SENNOSIDES 1 TABLET: 8.6; 5 TABLET ORAL at 20:17

## 2021-09-26 RX ADMIN — HYDROXYZINE HYDROCHLORIDE 50 MG: 50 TABLET, FILM COATED ORAL at 21:27

## 2021-09-26 RX ADMIN — ACETAMINOPHEN 975 MG: 325 TABLET, FILM COATED ORAL at 23:56

## 2021-09-26 RX ADMIN — LIDOCAINE 1 PATCH: 246 PATCH TOPICAL at 20:18

## 2021-09-26 RX ADMIN — ONDANSETRON 4 MG: 4 TABLET, ORALLY DISINTEGRATING ORAL at 11:39

## 2021-09-26 RX ADMIN — SODIUM CHLORIDE, POTASSIUM CHLORIDE, SODIUM LACTATE AND CALCIUM CHLORIDE: 600; 310; 30; 20 INJECTION, SOLUTION INTRAVENOUS at 09:31

## 2021-09-26 RX ADMIN — ASPIRIN 162 MG: 81 TABLET, COATED ORAL at 20:17

## 2021-09-26 RX ADMIN — CYCLOSPORINE 100 MG: 100 CAPSULE, LIQUID FILLED ORAL at 09:28

## 2021-09-26 RX ADMIN — MYCOPHENOLATE MOFETIL 500 MG: 250 CAPSULE ORAL at 21:26

## 2021-09-26 RX ADMIN — ACETAMINOPHEN 975 MG: 325 TABLET, FILM COATED ORAL at 08:31

## 2021-09-26 RX ADMIN — ASPIRIN 162 MG: 81 TABLET, COATED ORAL at 08:31

## 2021-09-26 RX ADMIN — MYCOPHENOLATE MOFETIL 500 MG: 250 CAPSULE ORAL at 08:32

## 2021-09-26 RX ADMIN — POLYETHYLENE GLYCOL 3350 17 G: 17 POWDER, FOR SOLUTION ORAL at 08:32

## 2021-09-26 RX ADMIN — LEVOTHYROXINE SODIUM 125 MCG: 125 TABLET ORAL at 08:32

## 2021-09-26 RX ADMIN — FAMOTIDINE 20 MG: 20 TABLET ORAL at 20:18

## 2021-09-26 NOTE — PROGRESS NOTES
"[facility], [place]   Internal Medicine Daily Note         Interval History/Events     No events overnight.   Afebrile. Denies chills, CP, SOB, N/V/D.         Review of Systems        4 point ROS including Respiratory, CV, GI and , other than that noted above is negative      Medications   I have reviewed current medications  in the \"current medication\" section of Epic.  Relevant changes include:     Physical Exam       Vital signs:    Blood pressure 124/62, pulse 85, temperature 96.9  F (36.1  C), temperature source Oral, resp. rate 18, height 1.575 m (5' 2.01\"), weight 108.8 kg (239 lb 13.8 oz), SpO2 99 %, not currently breastfeeding.  Estimated body mass index is 43.86 kg/m  as calculated from the following:    Height as of this encounter: 1.575 m (5' 2.01\").    Weight as of this encounter: 108.8 kg (239 lb 13.8 oz).      Intake/Output Summary (Last 24 hours) at 9/25/2021 0800  Last data filed at 9/25/2021 0400  Gross per 24 hour   Intake 1200 ml   Output 500 ml   Net 700 ml        Gen: Well Appearing. In NAD.  HEENT: NC/AT, EOMI, Anicteric Sclera  Neck: Supple  CV: Normal S1S2, Regular rhythm, normal rate  Lungs: Clear to ascultation b/l  Abd: Soft, NT/ND, +BS  Extremties:  Left knee in dressing. No LE Edema b/l   Skin: No rashes on exposed skin  Neurologic: AAOx3     Laboratory and Imaging Studies     I have reviewed  laboratory and imaging studies in the Epic. Pertinent findings are as below:    BMP  Recent Labs   Lab 09/26/21  0607 09/25/21  0548 09/24/21  1903 09/24/21  0850 09/24/21  0815    138  --   --   --    POTASSIUM 4.5 4.5  --   --   --    CHLORIDE 108 109  --   --   --    JACOB 7.9* 8.1*  --   --   --    CO2 28 29  --   --   --    BUN 20 27  --   --   --    CR 1.05* 1.19*  --  1.16*  --    * 107* 102*  --  106*     CBC  Recent Labs   Lab 09/26/21  0607 09/25/21  0548 09/24/21  0850   WBC 10.2  --   --    RBC 3.18*  --   --    HGB 10.6*   < >  --    HCT 31.5* "  --   --    MCV 99  --   --    MCH 33.3*  --   --    MCHC 33.7  --   --    RDW 12.1  --   --    *  --  142*    < > = values in this interval not displayed.     INR  Recent Labs   Lab 09/24/21  0850   INR 0.91     LFTsNo lab results found in last 7 days.   PANCNo lab results found in last 7 days.        Impression/Plan      Nicci Pemberton is a 62 YO Female with history of liver cirrhosis 2/2 ANGULO s/p liver transplant (8/2019) on immunosuppressive medications, Obesity, thrombocytopenia, degenerative joint disease s/p right RKA 10/2020, hypothyroidism, former smoker, b/l lower extremity lymphadema, CKD and GERD who was admitted and now s/p L TKA on 9/24/2021    S/p L TKA   - POD2  - Care per primary; Wound care, Pain Management, DVT ppx  - Bowel regimen   - PT/OT    Hx of Liver Cirrhosis 2/2 ANGULO s/p liver transplant 8/2019  Thrombocytpenia  - Hx of rejection treated with high dose steroids.  - Continue Cyclosporine, MWF & Mycophenolate twice daily  - Follow up outpatient team at discharge    CKD   - Renal function is stable    Hypothyrodism  - Continue levothyroxine     Diet: Regular  DVT Prophylaxis: Per primay  Code Status: Full Code      Expected Discharge/Disposition: Per Primary     Pt's care was discussed with bedside RN, patient and  during Care Team Rounds.       Codie Turner MD  Hospitalist ( Internal medicine)  Pager: [phone]

## 2021-09-26 NOTE — PROGRESS NOTES
"Orthopedic Surgery Progress Note    Subjective:   No acute events overnight. Patient states she had a significant amount of left knee pain yesterday during the day. Pain controlled overnight. Tolerating PO. Voiding. Last BM prior to surgery. No numbness or tingling. Has been out of bed and ambulating with a walker.    Exam:  /60 (BP Location: Left arm)   Pulse 109   Temp 97.2  F (36.2  C) (Oral)   Resp 20   Ht 1.575 m (5' 2.01\")   Wt 108.8 kg (239 lb 13.8 oz)   SpO2 96%   BMI 43.86 kg/m    Gen: Awake, alert, NAD  Resp: breathing equal and non-labored  Extremities:    LLE: Dressing clean/dry/intact. Able to fire quad, TA, GSC, EHL, FHL. SILT to SP/DP/saphenous/sural/tibial nerves. Toes warm and well perfused.      Labs:    Recent Labs   Lab 09/25/21  0548 09/24/21  0850   HGB 10.8*  --    PLT  --  142*     Recent Labs   Lab 09/25/21  0548 09/24/21  1903 09/24/21  0850 09/24/21  0815     --   --   --    POTASSIUM 4.5  --   --   --    CHLORIDE 109  --   --   --    CO2 29  --   --   --    BUN 27  --   --   --    CR 1.19*  --  1.16*  --    * 102*  --  106*     Recent Labs   Lab 09/24/21  0850   INR 0.91   PTT 30       Assessment:   61 year old female s/p left TKA on 9/24/21 with Dr. Wallace. Doing well.    Plan for today:  - PT/OT  - Possible discharge pending PT/OT    Plan:  Ortho Primary  Activity: Up with assist and assistive devices as needed until independent.   Weight bearing status: WBAT  ROM: ROMAT  Diet: Begin with clear fluids and progress diet as tolerated. Bowel regimen. Anti-emetics PRN.    DVT prophylaxis: Mechanical while in hospital, aspirin 162mg x 4 weeks  Elevation: Elevate heels off of bed on pillows   Wound Care: Dressing to remain c/d/I x 7 days.    Pain management: Orals PRN, IV for breakthrough only  Physical Therapy: Mobilization, ROM, ADL's  Occupational Therapy: ADL's  Consults: PT, OT. Hospitalist, appreciate assistance in caring for this patient throughout the " perioperative period  Disposition: Pending progress with therapies, pain control on orals, and medical stability, anticipate discharge to Home pending PT clearance            Future Appointments   Date Time Provider Department Center   9/24/2021  3:15 PM Monae Schofield Pt, PT URPT [place]   9/25/2021  8:00 AM Alma Rosa Cruz OT [facility]OT [place]   9/25/2021 11:00 AM Mc Howard, PT [facility] [place]   9/25/2021  1:00 PM Mc Howard, PT [facility] [place]   9/28/2021  1:30 PM Martita Milligan, PT [facility]   10/28/2021  8:30 AM Mario Wallace MD [facility]         Genet Sy MD  Orthopaedic Surgery PGY-4

## 2021-09-26 NOTE — PLAN OF CARE
"   VS: /60 (BP Location: Left arm)   Pulse 109   Temp 97.2  F (36.2  C) (Oral)   Resp 20   Ht 1.575 m (5' 2.01\")   Wt 108.8 kg (239 lb 13.8 oz)   SpO2 96%   BMI 43.86 kg/m      Tele: NSR. Pt became increasingly tachy (100-115) at 0100. All other VVS, pt asymptomatic.   LS Clear. Denies CP and SOB. 1L NC.    Output: Voiding spontaneously without difficulty on commode. Baseline incontinence at times - brief on.   LBM 9/23 +flatus     Activity: Up with SBA, walker and gait belt     Skin: Intact ex Lt knee incision   Pain:    Lt knee pain managed with PRN dilaudid Q3 and ice.    Neuro/CMS:    A&Ox4, denies numbness and tingling. Edema +2 in Hilario LE.    Dressing(s):    Lt knee - CDI. Ace wrapped   Diet:    Regular   LDA:    Rt PIV - SL  1L NC when sleeping.      Equipment:    Walker, commode, PCDs   Plan:    Per Ortho - possible discharge pending PT/OT. Continue to monitor and follow plan of care. Able to make needs known and call light within reach.     Additional Info:                "

## 2021-09-26 NOTE — PLAN OF CARE
"  VS: /62 (BP Location: Left arm)   Pulse 85   Temp 96.9  F (36.1  C) (Oral)   Resp 18   Ht 1.575 m (5' 2.01\")   Wt 108.8 kg (239 lb 13.8 oz)   SpO2 99%   BMI 43.86 kg/m    1-2 L of oxygen in AM.  In the afternoon patient tolerating room air with sats >98%.   Output: Voiding spontaneously without difficulty. LBM 9/23    Lungs: CTA   Activity: Up with SBA, walker and gait belt     Skin: Intact ex incision.   Pt has baseline edema in BLE   Pain:   Tolerable with discomfort. Pt does not want to take anymore PO dilaudid because it makes her feel \"dizzy and lightheaded\".  Report given to oncoming nurse and ortho will be paged to advise in pain control without taking any narcotics.  Scheduled tylenol given.   Neuro/CMS:   A&Ox4, denies numbness and tingling   Dressing(s):   CDI   Diet:   Regular- tolerating ok   LDA:   No IV access   Equipment:   Walker, commode   Plan:   Continue to monitor and follow plan of care. Able to make needs known and call light within reach.   Additional Info:   Tele- NSR       "

## 2021-09-27 ENCOUNTER — APPOINTMENT (OUTPATIENT)
Dept: PHYSICAL THERAPY | Facility: CLINIC | Age: 61
DRG: 470 | End: 2021-09-27
Attending: ORTHOPAEDIC SURGERY
Payer: COMMERCIAL

## 2021-09-27 ENCOUNTER — TELEPHONE (OUTPATIENT)
Dept: TRANSPLANT | Facility: CLINIC | Age: 61
End: 2021-09-27

## 2021-09-27 ENCOUNTER — APPOINTMENT (OUTPATIENT)
Dept: OCCUPATIONAL THERAPY | Facility: CLINIC | Age: 61
DRG: 470 | End: 2021-09-27
Attending: ORTHOPAEDIC SURGERY
Payer: COMMERCIAL

## 2021-09-27 LAB — GLUCOSE BLDC GLUCOMTR-MCNC: 114 MG/DL (ref 70–99)

## 2021-09-27 PROCEDURE — 250N000013 HC RX MED GY IP 250 OP 250 PS 637: Performed by: INTERNAL MEDICINE

## 2021-09-27 PROCEDURE — 97116 GAIT TRAINING THERAPY: CPT | Mod: GP | Performed by: PHYSICAL THERAPIST

## 2021-09-27 PROCEDURE — 97530 THERAPEUTIC ACTIVITIES: CPT | Mod: GP

## 2021-09-27 PROCEDURE — 250N000013 HC RX MED GY IP 250 OP 250 PS 637

## 2021-09-27 PROCEDURE — 97110 THERAPEUTIC EXERCISES: CPT | Mod: GP

## 2021-09-27 PROCEDURE — 97530 THERAPEUTIC ACTIVITIES: CPT | Mod: GP | Performed by: PHYSICAL THERAPIST

## 2021-09-27 PROCEDURE — 97535 SELF CARE MNGMENT TRAINING: CPT | Mod: GO | Performed by: OCCUPATIONAL THERAPIST

## 2021-09-27 PROCEDURE — 250N000012 HC RX MED GY IP 250 OP 636 PS 637: Performed by: INTERNAL MEDICINE

## 2021-09-27 PROCEDURE — 120N000002 HC R&B MED SURG/OB UMMC

## 2021-09-27 PROCEDURE — 250N000013 HC RX MED GY IP 250 OP 250 PS 637: Performed by: STUDENT IN AN ORGANIZED HEALTH CARE EDUCATION/TRAINING PROGRAM

## 2021-09-27 PROCEDURE — 99232 SBSQ HOSP IP/OBS MODERATE 35: CPT | Performed by: HOSPITALIST

## 2021-09-27 RX ADMIN — ACETAMINOPHEN 975 MG: 325 TABLET, FILM COATED ORAL at 07:38

## 2021-09-27 RX ADMIN — CYCLOSPORINE 100 MG: 100 CAPSULE, LIQUID FILLED ORAL at 21:20

## 2021-09-27 RX ADMIN — METHOCARBAMOL 500 MG: 500 TABLET ORAL at 07:38

## 2021-09-27 RX ADMIN — METHOCARBAMOL 500 MG: 500 TABLET ORAL at 15:58

## 2021-09-27 RX ADMIN — FAMOTIDINE 20 MG: 20 TABLET ORAL at 07:39

## 2021-09-27 RX ADMIN — METHOCARBAMOL 500 MG: 500 TABLET ORAL at 12:18

## 2021-09-27 RX ADMIN — LEVOTHYROXINE SODIUM 125 MCG: 125 TABLET ORAL at 07:38

## 2021-09-27 RX ADMIN — HYDROXYZINE HYDROCHLORIDE 50 MG: 50 TABLET, FILM COATED ORAL at 18:03

## 2021-09-27 RX ADMIN — CYCLOSPORINE 100 MG: 100 CAPSULE, LIQUID FILLED ORAL at 09:15

## 2021-09-27 RX ADMIN — Medication 2.5 MG: at 09:15

## 2021-09-27 RX ADMIN — ASPIRIN 162 MG: 81 TABLET, COATED ORAL at 20:08

## 2021-09-27 RX ADMIN — ASPIRIN 162 MG: 81 TABLET, COATED ORAL at 07:39

## 2021-09-27 RX ADMIN — DOCUSATE SODIUM AND SENNOSIDES 1 TABLET: 8.6; 5 TABLET ORAL at 07:39

## 2021-09-27 RX ADMIN — METHOCARBAMOL 500 MG: 500 TABLET ORAL at 20:08

## 2021-09-27 RX ADMIN — FAMOTIDINE 20 MG: 20 TABLET ORAL at 20:08

## 2021-09-27 RX ADMIN — MYCOPHENOLATE MOFETIL 500 MG: 250 CAPSULE ORAL at 21:19

## 2021-09-27 RX ADMIN — POLYETHYLENE GLYCOL 3350 17 G: 17 POWDER, FOR SOLUTION ORAL at 07:39

## 2021-09-27 RX ADMIN — Medication 2.5 MG: at 21:20

## 2021-09-27 RX ADMIN — MYCOPHENOLATE MOFETIL 500 MG: 250 CAPSULE ORAL at 09:15

## 2021-09-27 RX ADMIN — LIDOCAINE 1 PATCH: 246 PATCH TOPICAL at 20:08

## 2021-09-27 NOTE — PROGRESS NOTES
"Orthopedic Surgery Progress Note    Subjective:   No acute events overnight. Did not mobilize much yesterday due to dizziness and lightheadedness, which she attributes to the PO dilaudid. Issues with pain control yesterday due to patient not wanting to take PO dilaudid. Robaxin and hydroxyzine added yesterday evening with some improvement. Tolerating PO. Voiding. Last BM 9/23. No numbness or tingling.     Exam:  /66 (BP Location: Left arm)   Pulse 96   Temp 98.3  F (36.8  C) (Oral)   Resp 18   Ht 1.575 m (5' 2.01\")   Wt 108.8 kg (239 lb 13.8 oz)   SpO2 100%   BMI 43.86 kg/m    Gen: Awake, alert, NAD  Resp: breathing equal and non-labored  Extremities:    LLE: Dressing clean/dry/intact. Able to fire quad, TA, GSC, EHL, FHL. SILT to SP/DP/saphenous/sural/tibial nerves. Toes warm and well perfused.      Labs:    Recent Labs   Lab 09/26/21  0607 09/25/21  0548 09/24/21  0850   WBC 10.2  --   --    HGB 10.6* 10.8*  --    *  --  142*     Recent Labs   Lab 09/26/21  0607 09/25/21  0548 09/24/21  1903 09/24/21  0850 09/24/21  0815    138  --   --   --    POTASSIUM 4.5 4.5  --   --   --    CHLORIDE 108 109  --   --   --    CO2 28 29  --   --   --    BUN 20 27  --   --   --    CR 1.05* 1.19*  --  1.16*  --    * 107* 102*  --  106*     Recent Labs   Lab 09/24/21  0850   INR 0.91   PTT 30       Assessment:   61 year old female s/p left TKA on 9/24/21 with Dr. Wallace.     Did not mobilize much yesterday due to dizziness and lightheadedness, which she attributes to the PO dilaudid. Issues with pain control yesterday due to patient not wanting to take PO dilaudid. Robaxin and hydroxyzine added yesterday evening with some improvement. Switched from PO dilaudid to PO oxycodone this AM.    Plan for today:  - PT/OT  - Bowel regimen  - Possible discharge pending PT/OT    Plan:  Ortho Primary  Activity: Up with assist and assistive devices as needed until independent.   Weight bearing status: WBAT  ROM: " ROMAT  Diet: Begin with clear fluids and progress diet as tolerated. Bowel regimen. Anti-emetics PRN.    DVT prophylaxis: Mechanical while in hospital, aspirin 162mg x 4 weeks  Elevation: Elevate heels off of bed on pillows   Wound Care: Dressing to remain c/d/I x 7 days.    Pain management: Orals PRN, IV for breakthrough only  Physical Therapy: Mobilization, ROM, ADL's  Occupational Therapy: ADL's  Consults: PT, OT. Hospitalist, appreciate assistance in caring for this patient throughout the perioperative period  Disposition: Pending progress with therapies, pain control on orals, and medical stability, anticipate discharge to Home pending PT clearance            Future Appointments   Date Time Provider Department Center   9/24/2021  3:15 PM Monae Schofield Pt, PT [facility]PT [place]   9/25/2021  8:00 AM Alma Rosa Cruz, OT [facility] [place]   9/25/2021 11:00 AM Mc Howard, PT [facility] [place]   9/25/2021  1:00 PM Mc Howard, PT [facility] [place]   9/28/2021  1:30 PM Martita Milligan, PT [facility]   10/28/2021  8:30 AM Mario Wallace MD [facility]         Genet Sy MD  Orthopaedic Surgery PGY-4

## 2021-09-27 NOTE — TELEPHONE ENCOUNTER
Patient Call: Transplant Lab/Orders  Route to LPN  Post Transplant Days: 771  When patient is less than 60 days post-transplant, route high priority    Reason for Call: Annual lab reorder   Liver labs before she become discharged 9/28/2021   Callback needed? Yes    Return Call Needed  Same as documented in contacts section  When to return call?: Greater than one day: Route standard priority

## 2021-09-27 NOTE — PLAN OF CARE
"      VS: /67 (BP Location: Left arm)   Pulse 87   Temp (!) 96.4  F (35.8  C) (Oral)   Resp 16   Ht 1.575 m (5' 2.01\")   Wt 108.8 kg (239 lb 13.8 oz)   SpO2 97%   BMI 43.86 kg/m       Output: Voiding spontaneously without difficulty, intermittent incontinence at baseline per pt report -- brief on, LBM 9/27/21.   Lungs: Lung sounds clear, room air, denies SOB, no cough.   Activity: SBA with walker and gait belt.   Skin: Intact ex left knee incision, baseline lymphedema in BLE. Lidocaine patch in place on left knee.   Pain:   Left knee pain managed with scheduled robaxin, scheduled tylenol, atarax q6hrs prn, oxycodone 2.5mg q3hrs prn, ice packs.   Neuro/CMS:   A&Ox4, CMS intact, denies N/T.   Dressing(s):   Left knee incision C/D/I, preventative mepilex on sacrum.   Diet:   Regular diet, tolerating well.   LDA:   None.   Equipment:   Walker, gait belt, PCDs.   Plan:   Continue to monitor pain, continue to encourage activity, continue POC.   Additional Info:   Call light within reach, pt able to make needs known.       "

## 2021-09-27 NOTE — PLAN OF CARE
"   VS: /65 (BP Location: Left arm)   Pulse 83   Temp 99.9  F (37.7  C) (Oral)   Resp 18   Ht 1.575 m (5' 2.01\")   Wt 108.8 kg (239 lb 13.8 oz)   SpO2 99%   BMI 43.86 kg/m       Tele: NSR.   LS Clear. Denies CP and SOB. 1L NC.   Output: Voiding spontaneously without difficulty on commode. Baseline incontinence at times - brief on.   LBM 9/23 +flatus      Activity: Up with SBA, walker and gait belt      Skin: Intact ex Lt knee incision   Pain:    Lt knee pain poorly controled. Pt no longer wanting narcotics d/t nausea/dizziness. Ortho notified. Started Robaxin, Atarax and lidocaine patch with scheduled tylenol and ice.       Neuro/CMS:    A&Ox4, denies numbness and tingling. Edema +1 in Hilario LE.    Dressing(s):    Lt knee - CDI. Ace wrapped   Diet:    Regular   LDA:    No PIV      Equipment:    Walker, commode, PCDs   Plan:    Continue to monitor and follow plan of care. Able to make needs known and call light within reach.      Additional Info:                "

## 2021-09-27 NOTE — PROGRESS NOTES
"[facility], [place]   Internal Medicine Daily Note         Interval History/Events     No events overnight.   Reporting a lot of pain in her knee. Plans to request oxycodone at 9:30am.  No BM since day before surgery.   Afebrile. Denies chills, CP, SOB, N/V/D.         Review of Systems        4 point ROS including Respiratory, CV, GI and , other than that noted above is negative      Medications   I have reviewed current medications  in the \"current medication\" section of Epic.  Relevant changes include:     Physical Exam       Vital signs:    Blood pressure 132/66, pulse 96, temperature 98.3  F (36.8  C), temperature source Oral, resp. rate 18, height 1.575 m (5' 2.01\"), weight 108.8 kg (239 lb 13.8 oz), SpO2 100 %, not currently breastfeeding.  Estimated body mass index is 43.86 kg/m  as calculated from the following:    Height as of this encounter: 1.575 m (5' 2.01\").    Weight as of this encounter: 108.8 kg (239 lb 13.8 oz).      Intake/Output Summary (Last 24 hours) at 9/25/2021 0800  Last data filed at 9/25/2021 0400  Gross per 24 hour   Intake 1200 ml   Output 500 ml   Net 700 ml        Gen: Well Appearing. In NAD.  HEENT: NC/AT, EOMI, Anicteric Sclera  Neck: Supple  CV: Normal S1S2, Regular rhythm, normal rate  Lungs: Clear to ascultation b/l  Abd: Soft, NT/ND, +BS  Extremties:  Left knee in dressing. No LE Edema b/l   Skin: No rashes on exposed skin  Neurologic: AAOx3     Laboratory and Imaging Studies     I have reviewed  laboratory and imaging studies in the Epic. Pertinent findings are as below:    BMP  Recent Labs   Lab 09/26/21  0607 09/25/21  0548 09/24/21  1903 09/24/21  0850 09/24/21  0815    138  --   --   --    POTASSIUM 4.5 4.5  --   --   --    CHLORIDE 108 109  --   --   --    JACOB 7.9* 8.1*  --   --   --    CO2 28 29  --   --   --    BUN 20 27  --   --   --    CR 1.05* 1.19*  --  1.16*  --    * 107* 102*  --  106*     CBC  Recent Labs   Lab " 09/26/21  0607 09/25/21  0548 09/24/21  0850   WBC 10.2  --   --    RBC 3.18*  --   --    HGB 10.6*   < >  --    HCT 31.5*  --   --    MCV 99  --   --    MCH 33.3*  --   --    MCHC 33.7  --   --    RDW 12.1  --   --    *  --  142*    < > = values in this interval not displayed.     INR  Recent Labs   Lab 09/24/21  0850   INR 0.91     LFTsNo lab results found in last 7 days.   PANCNo lab results found in last 7 days.        Impression/Plan      Nicci Pemberton is a 62 YO Female with history of liver cirrhosis 2/2 ANUGLO s/p liver transplant (8/2019) on immunosuppressive medications, Obesity, thrombocytopenia, degenerative joint disease s/p right RKA 10/2020, hypothyroidism, former smoker, b/l lower extremity lymphadema, CKD and GERD who was admitted and now s/p L TKA on 9/24/2021    S/p L TKA   - Care per primary; Wound care, Pain Management, DVT ppx  - Bowel regimen   - PT/OT    Hx of Liver Cirrhosis 2/2 ANGULO s/p liver transplant 8/2019  Thrombocytpenia  - Hx of rejection treated with high dose steroids.  - Continue Cyclosporine, MWF & Mycophenolate twice daily  - Check LFT in AM per patient request.  - Follow up outpatient team at discharge    CKD   - Renal function is stable    Hypothyrodism  - Continue levothyroxine     Diet: Regular  DVT Prophylaxis: Per primay  Code Status: Full Code      Expected Discharge/Disposition: Per Primary     Pt's care was discussed with bedside RN, patient and  during Care Team Rounds.       Codie Turner MD  Hospitalist ( Internal medicine)  Pager: [phone]

## 2021-09-27 NOTE — PLAN OF CARE
VSS.L knee drsg,CDI,acewrapped.Lido patches in place on lateral sides of L knee.Pain is tolerable and only wanting tylenol.No numbness/tingling sensation.Up SBA1 and walker to commode.Voiding well.States have not passed gas for atleast 24 hrs now.Last  BM 9/23.Pt encouraged on drinking prune juice this morning to which she says she'll order for breakfast.No PIV line.LS clear,but desatting to lowest at 88% on room air when asleep.Placed oxygen back at 1lpm via NC satting 92% up.  Pt wants her hepatic function level checked.States shld be checked every 2 wks.E-sticky note to MD to order lab.  No redness on coccyx/sacral but mepilex drsg tin place as protective measure.Calls to make needs known.

## 2021-09-27 NOTE — LETTER
OUTPATIENT OR TRANSITIONAL CARE  LABORATORY TEST ORDER  Patient copy     Patient Name:    Nicci Pemberton                                    Transplant Date:   8/18/2019   YOB: 1960                                             Issue Date:            09/28/2021   [facility] MR#:    [id number]                                           Expiration Date:   09/28/2022         Diagnoses:            [x]?      Liver Transplant (ICD-10 Z94.4)                                 [x]?      Long term use of medications (ICD-10 Z79.899)      Please fax results to [phone]     Frequency: every 2-3 months and PRN        [x]?         CBC with Platelets   [x]?         Basic Metabolic Panel (Sodium, Potassium, Chloride, CO2, Creatinine, Urea Nitrogen, Glucose, Calcium)  [x]?         Hepatic panel (Albumin, Alk Phos, ALT, AST, Direct and Total Bili)   [x]?         Cyclosporine drug level - 12 hour trough, please document time of last dose        Other Frequency: annually due October 2021  [x]?         Fasting lipid panel  [x]?         Random urine protein  [x]?         Urinalysis with reflex to micro    If you have questions, please call [phone] or [phone].      Thomas M. Leventhal, M.D.   of Medicine  Advanced & Transplant Hepatology  [facility]

## 2021-09-27 NOTE — PLAN OF CARE
"  Vitals /70 (BP Location: Left arm)   Pulse 88   Temp 96.9  F (36.1  C) (Oral)   Resp 16   Ht 1.575 m (5' 2.01\")   Wt 108.8 kg (239 lb 13.8 oz)   SpO2 100%   BMI 43.86 kg/m    VSS on RA   A+O A+Ox4   Output Voiding spont  LMB: 9/27/21   Respiratory WDL   Cardiac WDL   Activity SBA with walker and gait belt   Skin Left knee incision, CDI   Pain 8/10  Pain managed with PO oxycodone  Pain meds offered when available, pt refusing due to not wanting to feel loopy while on them   Neuro/CSM Denies N/T   LDA No IV access   Equipment Walker, Gait belt  commode   Plan Continue plan of care   Additional                        "

## 2021-09-27 NOTE — PROGRESS NOTES
Care Management Discharge Note    Discharge Date: 09/27/2021       Discharge Disposition:  Home    Discharge Services:  OP PT    Discharge DME:  None    Discharge Transportation: Spouse will provide    Private pay costs discussed: Not applicable    PAS Confirmation Code: N/A  Patient/family educated on Medicare website which has current facility and service quality ratings:  N/A    Education Provided on the Discharge Plan: yes   Persons Notified of Discharge Plans: patient, OP Provider for PT  Patient/Family in Agreement with the Plan:  yes    Handoff Referral Completed: Yes    Additional Information:  This RNCC met with patient at bedside to discuss discharge planning, patient doesn't appear to have any complex needs during admission or at discharge and is progressing with mobility. Plan is for patient to return to previous living situation with recommendations by therapy for OP Physical Therapy. Spouse will provide will provide transport home.    RNCC will reschedule patient appt from 9/28-9/30        Judy CHOIN RN CCM  RN Care Coordinator [address] [place] [address]. [place] [address]  [email or web address]   Office: [phone]   Pager: [phone]    To contact Weekend RNCC, dial [phone] and enter job code [phone] at prompt. This pager can not be contacted by text page or outside line.

## 2021-09-28 ENCOUNTER — PATIENT OUTREACH (OUTPATIENT)
Dept: CARE COORDINATION | Facility: CLINIC | Age: 61
End: 2021-09-28

## 2021-09-28 ENCOUNTER — APPOINTMENT (OUTPATIENT)
Dept: OCCUPATIONAL THERAPY | Facility: CLINIC | Age: 61
DRG: 470 | End: 2021-09-28
Attending: ORTHOPAEDIC SURGERY
Payer: COMMERCIAL

## 2021-09-28 ENCOUNTER — APPOINTMENT (OUTPATIENT)
Dept: PHYSICAL THERAPY | Facility: CLINIC | Age: 61
DRG: 470 | End: 2021-09-28
Attending: ORTHOPAEDIC SURGERY
Payer: COMMERCIAL

## 2021-09-28 VITALS
HEART RATE: 95 BPM | SYSTOLIC BLOOD PRESSURE: 125 MMHG | TEMPERATURE: 98.3 F | DIASTOLIC BLOOD PRESSURE: 58 MMHG | OXYGEN SATURATION: 91 % | WEIGHT: 239.86 LBS | BODY MASS INDEX: 44.14 KG/M2 | HEIGHT: 62 IN | RESPIRATION RATE: 16 BRPM

## 2021-09-28 LAB
ALBUMIN SERPL-MCNC: 2.4 G/DL (ref 3.4–5)
ALP SERPL-CCNC: 110 U/L (ref 40–150)
ALT SERPL W P-5'-P-CCNC: 14 U/L (ref 0–50)
AST SERPL W P-5'-P-CCNC: 22 U/L (ref 0–45)
BILIRUB DIRECT SERPL-MCNC: 0.7 MG/DL (ref 0–0.2)
BILIRUB SERPL-MCNC: 1.9 MG/DL (ref 0.2–1.3)
ERYTHROCYTE [DISTWIDTH] IN BLOOD BY AUTOMATED COUNT: 11.9 % (ref 10–15)
HCT VFR BLD AUTO: 29.3 % (ref 35–47)
HGB BLD-MCNC: 10.1 G/DL (ref 11.7–15.7)
MCH RBC QN AUTO: 33.9 PG (ref 26.5–33)
MCHC RBC AUTO-ENTMCNC: 34.5 G/DL (ref 31.5–36.5)
MCV RBC AUTO: 98 FL (ref 78–100)
PLATELET # BLD AUTO: 128 10E3/UL (ref 150–450)
PROT SERPL-MCNC: 6 G/DL (ref 6.8–8.8)
RBC # BLD AUTO: 2.98 10E6/UL (ref 3.8–5.2)
WBC # BLD AUTO: 8.8 10E3/UL (ref 4–11)

## 2021-09-28 PROCEDURE — 250N000013 HC RX MED GY IP 250 OP 250 PS 637

## 2021-09-28 PROCEDURE — 93010 ELECTROCARDIOGRAM REPORT: CPT | Performed by: INTERNAL MEDICINE

## 2021-09-28 PROCEDURE — 99232 SBSQ HOSP IP/OBS MODERATE 35: CPT | Performed by: HOSPITALIST

## 2021-09-28 PROCEDURE — 97535 SELF CARE MNGMENT TRAINING: CPT | Mod: GO | Performed by: OCCUPATIONAL THERAPIST

## 2021-09-28 PROCEDURE — 80076 HEPATIC FUNCTION PANEL: CPT | Performed by: HOSPITALIST

## 2021-09-28 PROCEDURE — 85027 COMPLETE CBC AUTOMATED: CPT | Performed by: HOSPITALIST

## 2021-09-28 PROCEDURE — 93005 ELECTROCARDIOGRAM TRACING: CPT

## 2021-09-28 PROCEDURE — 97530 THERAPEUTIC ACTIVITIES: CPT | Mod: GP

## 2021-09-28 PROCEDURE — 250N000013 HC RX MED GY IP 250 OP 250 PS 637: Performed by: INTERNAL MEDICINE

## 2021-09-28 PROCEDURE — 250N000012 HC RX MED GY IP 250 OP 636 PS 637: Performed by: INTERNAL MEDICINE

## 2021-09-28 PROCEDURE — 36415 COLL VENOUS BLD VENIPUNCTURE: CPT | Performed by: HOSPITALIST

## 2021-09-28 PROCEDURE — 97116 GAIT TRAINING THERAPY: CPT | Mod: GP

## 2021-09-28 PROCEDURE — 250N000013 HC RX MED GY IP 250 OP 250 PS 637: Performed by: STUDENT IN AN ORGANIZED HEALTH CARE EDUCATION/TRAINING PROGRAM

## 2021-09-28 RX ORDER — METHOCARBAMOL 500 MG/1
500 TABLET, FILM COATED ORAL 4 TIMES DAILY
Qty: 40 TABLET | Refills: 0 | Status: SHIPPED | OUTPATIENT
Start: 2021-09-28 | End: 2021-11-29

## 2021-09-28 RX ORDER — OXYCODONE HYDROCHLORIDE 5 MG/1
2.5-5 TABLET ORAL
Qty: 30 TABLET | Refills: 0 | Status: SHIPPED | OUTPATIENT
Start: 2021-09-28 | End: 2021-10-04

## 2021-09-28 RX ADMIN — Medication 2.5 MG: at 06:49

## 2021-09-28 RX ADMIN — Medication 5 MG: at 13:06

## 2021-09-28 RX ADMIN — HYDROXYZINE HYDROCHLORIDE 50 MG: 50 TABLET, FILM COATED ORAL at 09:12

## 2021-09-28 RX ADMIN — LEVOTHYROXINE SODIUM 125 MCG: 125 TABLET ORAL at 07:48

## 2021-09-28 RX ADMIN — CYCLOSPORINE 100 MG: 100 CAPSULE, LIQUID FILLED ORAL at 09:12

## 2021-09-28 RX ADMIN — METHOCARBAMOL 500 MG: 500 TABLET ORAL at 11:42

## 2021-09-28 RX ADMIN — Medication 2.5 MG: at 01:02

## 2021-09-28 RX ADMIN — METHOCARBAMOL 500 MG: 500 TABLET ORAL at 07:49

## 2021-09-28 RX ADMIN — ASPIRIN 162 MG: 81 TABLET, COATED ORAL at 07:47

## 2021-09-28 RX ADMIN — FAMOTIDINE 20 MG: 20 TABLET ORAL at 07:48

## 2021-09-28 RX ADMIN — DOCUSATE SODIUM AND SENNOSIDES 1 TABLET: 8.6; 5 TABLET ORAL at 07:48

## 2021-09-28 RX ADMIN — MYCOPHENOLATE MOFETIL 500 MG: 250 CAPSULE ORAL at 09:12

## 2021-09-28 RX ADMIN — Medication 5 MG: at 09:45

## 2021-09-28 NOTE — PLAN OF CARE
Occupational Therapy Discharge Summary    Reason for therapy discharge:    All goals and outcomes met, no further needs identified.    Progress towards therapy goal(s). See goals on Care Plan in [facility] electronic health record for goal details.  Goals met    Therapy recommendation(s):    No further therapy is recommended.

## 2021-09-28 NOTE — PROGRESS NOTES
"Orthopedic Surgery Progress Note    Subjective:   No acute events overnight. Reports ongoing left knee pain. Happy with current pain med regimen. Tolerating PO. Voiding. Last BM 9/27. No LLE numbness or tingling. Has been out of bed and ambulating with a walker.     Exam:  /63 (BP Location: Left arm)   Pulse 106   Temp 98.3  F (36.8  C) (Oral)   Resp 16   Ht 1.575 m (5' 2.01\")   Wt 108.8 kg (239 lb 13.8 oz)   SpO2 90%   BMI 43.86 kg/m    Gen: Awake, alert, NAD  Resp: breathing equal and non-labored  Extremities:    LLE: Dressing clean/dry/intact. Able to fire quad, TA, GSC, EHL, FHL. SILT to SP/DP/saphenous/sural/tibial nerves. Toes warm and well perfused.      Labs:    Recent Labs   Lab 09/28/21  0534 09/26/21  0607 09/25/21  0548 09/24/21  0850   WBC 8.8 10.2  --   --    HGB 10.1* 10.6* 10.8*  --    * 102*  --  142*     Recent Labs   Lab 09/27/21  2153 09/26/21  0607 09/25/21  0548 09/24/21  1903 09/24/21  0850 09/24/21  0815   NA  --  139 138  --   --   --    POTASSIUM  --  4.5 4.5  --   --   --    CHLORIDE  --  108 109  --   --   --    CO2  --  28 29  --   --   --    BUN  --  20 27  --   --   --    CR  --  1.05* 1.19*  --  1.16*  --    * 108* 107* 102*  --    < >    < > = values in this interval not displayed.     Recent Labs   Lab 09/24/21  0850   INR 0.91   PTT 30       Assessment:   61 year old female s/p left TKA on 9/24/21 with Dr. Wallace. Has been slow to mobilize due to pain.     Plan for today:  - PT/OT  - Discharge pending PT/OT    Plan:  Ortho Primary  Activity: Up with assist and assistive devices as needed until independent.   Weight bearing status: WBAT  ROM: ROMAT  Diet: Begin with clear fluids and progress diet as tolerated. Bowel regimen. Anti-emetics PRN.    DVT prophylaxis: Mechanical while in hospital, aspirin 162mg x 4 weeks  Elevation: Elevate heels off of bed on pillows   Wound Care: Dressing to remain c/d/I x 7 days.    Pain management: Orals PRN, IV for " breakthrough only  Physical Therapy: Mobilization, ROM, ADL's  Occupational Therapy: ADL's  Consults: PT, OT. Hospitalist, appreciate assistance in caring for this patient throughout the perioperative period  Disposition: Pending progress with therapies, pain control on orals, and medical stability, anticipate discharge to Home pending PT clearance            Future Appointments   Date Time Provider Department Center   9/24/2021  3:15 PM Monae Schofield Pt, PT [facility] [place]   9/25/2021  8:00 AM Alma Rosa Cruz OT [facility] [place]   9/25/2021 11:00 AM Mc Howard, PT [facility] [place]   9/25/2021  1:00 PM Mc Howard, PT [facility] [place]   9/28/2021  1:30 PM [facility]   10/28/2021  8:30 AM Mario Wallace MD [facility]         Genet Sy MD  Orthopaedic Surgery PGY-4

## 2021-09-28 NOTE — PROGRESS NOTES
"[facility] [place]   Internal Medicine Daily Note         Interval History/Events     No events overnight.   Afebrile. Denies chills, CP, SOB, N/V/D.         Review of Systems        4 point ROS including Respiratory, CV, GI and , other than that noted above is negative      Medications   I have reviewed current medications  in the \"current medication\" section of Epic.  Relevant changes include:     Physical Exam       Vital signs:    Blood pressure 125/58, pulse 95, temperature 98.3  F (36.8  C), temperature source Oral, resp. rate 16, height 1.575 m (5' 2.01\"), weight 108.8 kg (239 lb 13.8 oz), SpO2 91 %, not currently breastfeeding.  Estimated body mass index is 43.86 kg/m  as calculated from the following:    Height as of this encounter: 1.575 m (5' 2.01\").    Weight as of this encounter: 108.8 kg (239 lb 13.8 oz).      Intake/Output Summary (Last 24 hours) at 9/25/2021 0800  Last data filed at 9/25/2021 0400  Gross per 24 hour   Intake 1200 ml   Output 500 ml   Net 700 ml        Gen: Well Appearing. In NAD.  HEENT: NC/AT, EOMI, Anicteric Sclera  Neck: Supple  CV: Normal S1S2, Regular rhythm, normal rate  Lungs: Clear to ascultation b/l  Abd: Soft, NT/ND, +BS  Extremties:  Left knee in dressing. No LE Edema b/l   Skin: No rashes on exposed skin  Neurologic: AAOx3     Laboratory and Imaging Studies     I have reviewed  laboratory and imaging studies in the Epic. Pertinent findings are as below:    BMP  Recent Labs   Lab 09/27/21  2153 09/26/21  0607 09/25/21  0548 09/24/21  1903 09/24/21  0850 09/24/21  0815   NA  --  139 138  --   --   --    POTASSIUM  --  4.5 4.5  --   --   --    CHLORIDE  --  108 109  --   --   --    JACOB  --  7.9* 8.1*  --   --   --    CO2  --  28 29  --   --   --    BUN  --  20 27  --   --   --    CR  --  1.05* 1.19*  --  1.16*  --    * 108* 107* 102*  --    < >    < > = values in this interval not displayed.     CBC  Recent Labs   Lab 09/28/21  0534 " 09/26/21  0607 09/26/21  0607 09/25/21  0548 09/24/21  0850   WBC 8.8  --  10.2  --   --    RBC 2.98*  --  3.18*  --   --    HGB 10.1*   < > 10.6*   < >  --    HCT 29.3*  --  31.5*  --   --    MCV 98  --  99  --   --    MCH 33.9*  --  33.3*  --   --    MCHC 34.5  --  33.7  --   --    RDW 11.9  --  12.1  --   --    *  --  102*  --  142*    < > = values in this interval not displayed.     INR  Recent Labs   Lab 09/24/21  0850   INR 0.91     LFTs  Recent Labs   Lab 09/28/21  0534   ALKPHOS 110   AST 22   ALT 14   BILITOTAL 1.9*   PROTTOTAL 6.0*   ALBUMIN 2.4*      PANCNo lab results found in last 7 days.        Impression/Plan      Nicci Pemberton is a 62 YO Female with history of liver cirrhosis 2/2 ANGULO s/p liver transplant (8/2019) on immunosuppressive medications, Obesity, thrombocytopenia, degenerative joint disease s/p right RKA 10/2020, hypothyroidism, former smoker, b/l lower extremity lymphadema, CKD and GERD who was admitted and now s/p L TKA on 9/24/2021    S/p L TKA   - Care per primary; Wound care, Pain Management, DVT ppx  - Bowel regimen   - PT/OT    Hx of Liver Cirrhosis 2/2 ANGULO s/p liver transplant 8/2019  Thrombocytpenia  - Hx of rejection treated with high dose steroids.  - Continue Cyclosporine, MWF & Mycophenolate twice daily  - Check LFT in AM per patient request.  - Follow up outpatient team at discharge    CKD   - Renal function is stable    Hypothyrodism  - Continue levothyroxine     Diet: Regular  DVT Prophylaxis: Per primay  Code Status: Full Code      Expected Discharge/Disposition: Per Primary     Pt's care was discussed with bedside RN, patient and  during Care Team Rounds.       Codie Turner MD  Hospitalist ( Internal medicine)  Pager: [phone]

## 2021-09-28 NOTE — PLAN OF CARE
"      VS: /63 (BP Location: Left arm)   Pulse 106   Temp 98.3  F (36.8  C) (Oral)   Resp 16   Ht 1.575 m (5' 2.01\")   Wt 108.8 kg (239 lb 13.8 oz)   SpO2 90%   BMI 43.86 kg/m       Output: Up to bathroom, voiding spontaneously. LBM: 9/27 - bowel meds held yesterday d/t BM x2   Lungs: Clear bilaterally, put on 2.5 L O2 via NC overnight d/t desating while sleeping. Denies chest pain or SOB.    Activity: SBA w/ walker & GB. Ambulated to bathroom thru night.    Skin: WDL ex L knee incision and BLE lymphedema   Pain:   Managed w/ 2.5 Oxy q3h. PRN Atarax also available. Scheduled Robaxin. Lido patch on L knee.   Pt is very hesitant to take narcotics. Using ice packs.    Neuro/CMS:   A & Ox 4. CMS intact, denies N/T.    Dressing(s):   L knee - CDI  Mepilex on sacrum - preventative. CDI   Diet:   Regular   LDA:   None   Equipment:   Walker, GB   Plan:   Monitor pain and encourage activity. Continue w/ POC.    Additional Info:         "

## 2021-09-28 NOTE — PLAN OF CARE
"  Vitals /58 (BP Location: Left arm)   Pulse 95   Temp 98.3  F (36.8  C) (Oral)   Resp 16   Ht 1.575 m (5' 2.01\")   Wt 108.8 kg (239 lb 13.8 oz)   SpO2 91%   BMI 43.86 kg/m    VSS on RA   A+O A+Ox4   Output Voiding spont  LMB: 9/27/21   Respiratory WDL   Cardiac WDL   Activity SBA with walker   Skin Left knee incision, CDI   Pain 6-9/10  Pt intermittently refuses pain meds  Pain managed with prn oxycodone and atarax; scheduled robaxin   Neuro/CSM Denies N/T   LDA No IV access   Equipment Walker, gait belt  Personal cane   Plan Discharge today   Additional Pt discharged to home with  at 1:30pm. All teaching and meds sent with patient.                      "

## 2021-09-28 NOTE — PLAN OF CARE
Physical Therapy Discharge Summary    Reason for therapy discharge:    Discharged to home with home therapy.    Progress towards therapy goal(s). See goals on Care Plan in [facility] electronic health record for goal details.  Goals partially met.  Barriers to achieving goals:   discharge from facility.    Therapy recommendation(s):    Continued therapy is recommended.  Rationale/Recommendations:  Continue to progress functional mobility in a  PT setting.

## 2021-09-28 NOTE — PROGRESS NOTES
Clinic Care Coordination Contact  [facility]/Voicemail       Clinical Data: Care Coordinator Outreach  Outreach attempted x 1.  Left message on patient's voicemail with call back information and requested return call.  Plan: Care Coordinator will try to reach patient again in 1-2 business days.    Tarsha Wolf [facility]  Clinic Care Coordinator  [facility]   [phone]

## 2021-09-29 LAB
ATRIAL RATE - MUSE: 95 BPM
DIASTOLIC BLOOD PRESSURE - MUSE: NORMAL MMHG
INTERPRETATION ECG - MUSE: NORMAL
P AXIS - MUSE: 50 DEGREES
PR INTERVAL - MUSE: 138 MS
QRS DURATION - MUSE: 82 MS
QT - MUSE: 330 MS
QTC - MUSE: 414 MS
R AXIS - MUSE: 3 DEGREES
SYSTOLIC BLOOD PRESSURE - MUSE: NORMAL MMHG
T AXIS - MUSE: 48 DEGREES
VENTRICULAR RATE- MUSE: 95 BPM

## 2021-09-29 NOTE — DISCHARGE SUMMARY
ORTHOPAEDIC SURGERY DISCHARGE SUMMARY     Date of Admission: 9/24/2021  Date of Discharge: 9/28/2021  1:30 PM  Disposition: Home  Staff Physician: No att. providers found  Primary Care Provider: Wale Thurston    DISCHARGE DIAGNOSIS:  Left knee pain [M25.562]    PROCEDURES: Procedure(s):  Left total knee arthroplasty on 9/24/2021 with Dr. Wallace    BRIEF HISTORY:  The patient has a long history of debilitating pain secondary to ostearthritis of the knee.  Despite comprehensive non-operative management these symptoms continued to interfere with activities of daily living.  After discussion of further treatment options including the risks and benefits that patient elected to proceed with a total knee.    HOSPITAL COURSE:    The patient was admitted following the above listed procedures for pain control and rehabilitation. Nicci Pemberton did well post-operatively. Medicine was consulted post operatively to aid in management of medical co-morbidities. The patient received routine nursing cares and at the time of discharge was medically stable. The patient is tolerating a regular diet and is voiding spontaneously. All PT/OT goals have been met for safe mobility. Pain is now controlled on oral medications which will be available on discharge. Stool softeners have been used while taking pain medications to help prevent constipation. Nicci Pemberton is deemed medically safe to discharge.     Antibiotics:  Ancef given periop and 24 hours postop.   DVT prophylaxis:  Aspirin 162mg initiated after surgery and will be continued for 4 weeks.   PT Progress:  Has met PT/OT goals for safe mobility.   Pain Meds:  Weaned off all IV pain meds by discharge.  Inpatient Events: No significant events or complications.     PHYSICAL EXAM:    Gen: Awake, alert, NAD  Resp: breathing equal and non-labored  Extremities:     LLE: Dressing clean/dry/intact. Able to fire quad, TA, GSC, EHL, FHL. SILT to SP/DP/saphenous/sural/tibial nerves. Toes  warm and well perfused.    FOLLOWUP:    Follow up with Dr. Wallace as scheduled on 10/28/21.     Future Appointments   Date Time Provider Department Center   10/5/2021  1:30 PM Martita Milligan PT NBPT [facility]   10/28/2021  8:30 AM Mario Wallace MD [facility]       Orthopaedic Surgery appointments are at the [facility] ([address] [place] [address]). Call [phone] to schedule a follow-up appointment at this location with your provider.     PLANNED DISCHARGE ORDERS:      Discharge Medication List as of 9/28/2021 12:51 PM      START taking these medications    Details   aspirin (ASA) 81 MG EC tablet Take 2 tablets (162 mg) by mouth 2 times daily, Disp-120 tablet, R-0, E-Prescribe      hydrOXYzine (ATARAX) 25 MG tablet Take 1 tablet (25 mg) by mouth every 6 hours as needed for other (adjuvant pain), Disp-30 tablet, R-0, E-Prescribe      methocarbamol (ROBAXIN) 500 MG tablet Take 1 tablet (500 mg) by mouth 4 times daily, Disp-40 tablet, R-0, E-Prescribe      oxyCODONE (ROXICODONE) 5 MG tablet Take 0.5-1 tablets (2.5-5 mg) by mouth every 3 hours as needed for moderate to severe pain, Disp-30 tablet, R-0, E-Prescribe      polyethylene glycol (MIRALAX) 17 g packet Take 17 g by mouth daily, Disp-7 packet, R-0, E-Prescribe         CONTINUE these medications which have CHANGED    Details   acetaminophen (TYLENOL) 325 MG tablet Take 2 tablets (650 mg) by mouth every 4 hours as needed for other, Disp-60 tablet, R-0, E-Prescribe         CONTINUE these medications which have NOT CHANGED    Details   Cholecalciferol (VITAMIN D-3) 25 MCG (1000 UT) CAPS Take 1,000 Units by mouth 2 times daily, Disp-60 capsule, R-1, E-Prescribe      COMPRESSION STOCKINGS 2 each every 12 hours, Disp-2 Product, R-1, Local PrintBLE knee high Velcro compression garments - [facility] if [facility] (Fax Number: [phone]).       cycloSPORINE modified (GENERIC EQUIVALENT) 25 MG capsule Take 4 capsules (100 mg) by mouth every morning AND 4 capsules (100 mg) every evening., Disp-720 capsule, R-3, E-Prescribe      famotidine (PEPCID) 20 MG tablet Take 1 tablet (20 mg) by mouth 2 times daily, Disp-60 tablet, R-3, E-Prescribe      levothyroxine (SYNTHROID/LEVOTHROID) 125 MCG tablet Take 125 mcg by mouth every morning , HistoricalManaged by PCP      mycophenolate (GENERIC EQUIVALENT) 250 MG capsule Take 2 capsules (500 mg) by mouth 2 times daily, Disp-360 capsule, R-3, E-PrescribeTXP DT 8/18/2019 (Liver) TXP Dischg DT 9/23/2019 DX Liver replaced by transplant Z94.4 TX Center [facility] ([place], [place])         STOP taking these medications       HYDROmorphone (DILAUDID) 2 MG tablet Comments:   Reason for Stopping:                 Discharge Procedure Orders   Physical Therapy Referral   Referral Priority: Routine Referral Type: Rehab Therapy Physical Therapy   Number of Visits Requested: 1     Care Coordination Referral   Standing Status: Future   Referral Priority: Routine Referral Type: Care Coordination   Number of Visits Requested: 1     Reason for your hospital stay   Order Comments: s/p left TKA on 9/24/21 with Dr. Wallace. Has been slow to mobilize due to pain.     Activity   Order Comments: Your activity upon discharge: activity as tolerated    Activity: Up with assist and assistive devices as needed until independent.   Weight bearing status: WBAT  ROM: ROMAT    Elevation: Elevate heels off of bed on pillows     Order Specific Question Answer Comments   Is discharge order? Yes      Adult [facility] Follow-up and recommended labs and tests   Order Comments: Follow up with Dr Wallace as scheduled.  Clinic phone number is     Appointments on [place] and/or [facility] (with [facility] or [facility] provider or service). Call [phone] if you haven't heard regarding these appointments within 7 days  of discharge.     When to contact your care team   Order Comments: Call Dr Wallace  if you have any of the following: temperature greater than 101.3  or less than 96.5 ,  increased shortness of breath, increased drainage, increased swelling, or increased pain.     Wound care and dressings   Order Comments: Instructions to care for your wound at home: ice to area for comfort, keep wound clean and dry, may get incision wet in shower but do not soak or scrub, reinforce dressing as needed, and remove dressing in 7 days.     Crutches DME   Order Comments: DME Documentation: Describe the reason for need to support medical necessity: Impaired gait status post knee surgery. I, the undersigned, certify that the above prescribed supplies are medically necessary for this patient and is both reasonable and necessary in reference to accepted standards of medical practice in the treatment of this patient's condition and is not prescribed as a convenience.     Order Specific Question Answer Comments   DME Provider: [facility]    Crutch Type: Standard    Crutches Add On: NA    Length of Need: Lifetime      Cane DME   Order Comments: DME Documentation: Describe the reason for need to support medical necessity: Impaired gait status post knee surgery. I, the undersigned, certify that the above prescribed supplies are medically necessary for this patient and is both reasonable and necessary in reference to accepted standards of medical practice in the treatment of this patient's condition and is not prescribed as a convenience.     Order Specific Question Answer Comments   DME Provider: [employer]    Cane Type: Single Tip    Reminder: Patient can typically get 1 every 5 years      Walker DME   Order Comments: DME Documentation: Describe the reason for need to support medical necessity: Impaired gait status post knee surgery. I, the undersigned, certify that the above prescribed supplies are medically necessary for this patient  and is both reasonable and necessary in reference to accepted standards of medical practice in the treatment of this patient's condition and is not prescribed as a convenience.     Order Specific Question Answer Comments   DME Provider: [place]    Walker Type: Standard (2 Wheel)    Accessories: N/A      Diet   Order Comments: Follow this diet upon discharge: Orders Placed This Encounter      Advance Diet as Tolerated: Regular Diet Adult     Order Specific Question Answer Comments   Is discharge order? Yes        Genet Sy MD  Orthopaedic Surgery Resident, PGY4

## 2021-10-04 ENCOUNTER — TELEPHONE (OUTPATIENT)
Dept: TRANSPLANT | Facility: CLINIC | Age: 61
End: 2021-10-04

## 2021-10-04 ENCOUNTER — TELEPHONE (OUTPATIENT)
Dept: ORTHOPEDICS | Facility: CLINIC | Age: 61
End: 2021-10-04

## 2021-10-04 ENCOUNTER — MYC MEDICAL ADVICE (OUTPATIENT)
Dept: ORTHOPEDICS | Facility: CLINIC | Age: 61
End: 2021-10-04

## 2021-10-04 DIAGNOSIS — Z96.651 STATUS POST TOTAL RIGHT KNEE REPLACEMENT: Primary | ICD-10-CM

## 2021-10-04 DIAGNOSIS — Z98.890 H/O ARTHROSCOPIC KNEE SURGERY: ICD-10-CM

## 2021-10-04 RX ORDER — HYDROXYZINE HYDROCHLORIDE 25 MG/1
25 TABLET, FILM COATED ORAL EVERY 6 HOURS PRN
Qty: 30 TABLET | Refills: 0 | Status: CANCELLED | OUTPATIENT
Start: 2021-10-04

## 2021-10-04 RX ORDER — HYDROXYZINE HYDROCHLORIDE 25 MG/1
25 TABLET, FILM COATED ORAL EVERY 6 HOURS PRN
Qty: 30 TABLET | Refills: 0 | Status: SHIPPED | OUTPATIENT
Start: 2021-10-04 | End: 2021-11-29

## 2021-10-04 RX ORDER — CEPHALEXIN 500 MG/1
500 CAPSULE ORAL 4 TIMES DAILY
Qty: 56 CAPSULE | Refills: 0 | Status: SHIPPED | OUTPATIENT
Start: 2021-10-04 | End: 2021-11-11 | Stop reason: ALTCHOICE

## 2021-10-04 RX ORDER — OXYCODONE HYDROCHLORIDE 5 MG/1
2.5-5 TABLET ORAL
Qty: 30 TABLET | Refills: 0 | Status: SHIPPED | OUTPATIENT
Start: 2021-10-04 | End: 2021-10-12

## 2021-10-04 RX ORDER — OXYCODONE HYDROCHLORIDE 5 MG/1
2.5-5 TABLET ORAL
Qty: 30 TABLET | Refills: 0 | Status: CANCELLED | OUTPATIENT
Start: 2021-10-04

## 2021-10-04 NOTE — TELEPHONE ENCOUNTER
Patient Call: General  Route to LPN    Reason for call: connect with pt regarding neck replacement medication     Call back needed? Yes    Return Call Needed  Same as documented in contacts section  When to return call?: Greater than one day: Route standard priority

## 2021-10-04 NOTE — TELEPHONE ENCOUNTER
Spoke directly with Nicci, discussed management of her pain with the variety of meds, ice, rest, elevation.   This RN discussed the wean off narcotics.  Nicci also discussed that her incision is slightly reddened. She will send photos in my chart for Dr. Wallace to review.    JIMBO LinaresN, RN  RN Care Coordinator, Dr. Rodrigo WYATT [facility]

## 2021-10-04 NOTE — TELEPHONE ENCOUNTER
Returned call to Nicci to discuss the photo's she sent which were reviewed by Dr. Wallace.  Will start her on Cephalexin 500mg QID, order sent to local pharmacy along with other refills.    This RN encouraged Nicci to reach out as her healing progresses and with any further questions.    Shala Hazel, BSN, RN  RN Care Coordinator, Dr. Rodrigo WYATT [facility]

## 2021-10-05 ENCOUNTER — HOSPITAL ENCOUNTER (OUTPATIENT)
Dept: PHYSICAL THERAPY | Facility: CLINIC | Age: 61
Setting detail: THERAPIES SERIES
End: 2021-10-05
Attending: ORTHOPAEDIC SURGERY
Payer: COMMERCIAL

## 2021-10-05 PROCEDURE — 97110 THERAPEUTIC EXERCISES: CPT | Mod: GP | Performed by: PHYSICAL MEDICINE & REHABILITATION

## 2021-10-05 PROCEDURE — 97161 PT EVAL LOW COMPLEX 20 MIN: CPT | Mod: GP | Performed by: PHYSICAL MEDICINE & REHABILITATION

## 2021-10-05 NOTE — PROGRESS NOTES
10/05/21 1300   General Information   Type of Visit Initial OP Ortho PT Evaluation   Start of Care Date 10/05/21   Referring Physician Mario Wallace MD    Patient/Family Goals Statement improve walking, stairs, transfers, bed mobility   Orders Evaluate and Treat   Date of Order 09/29/21   Certification Required? No   Medical Diagnosis Primary osteoarthritis of left knee    Surgical/Medical history reviewed Yes   Precautions/Limitations no known precautions/limitations   General Information Comments PMHx per pt report: liver transplant, smoking (quit 10/3/11)   Body Part(s)   Body Part(s) Knee   Presentation and Etiology   Pertinent history of current problem (include personal factors and/or comorbidities that impact the POC) Pt arrived to PT today for L TKA performed on 9/24/21. Notes already on antibiotics to avoid infection. Has had prior R TKA in the past.    Impairments A. Pain;B. Decreased WB tolerance;C. Swelling;D. Decreased ROM;E. Decreased flexibility;F. Decreased strength and endurance;G. Impaired balance;H. Impaired gait   Functional Limitations perform activities of daily living;perform desired leisure / sports activities   Symptom Location L knee   How/Where did it occur Other  (s/p TKA)   Onset date of current episode/exacerbation 09/24/21   Chronicity New   Pain rating (0-10 point scale) Best (/10);Worst (/10)   Best (/10) unable to quantify   Worst (/10) 8   Pain quality A. Sharp;B. Dull;C. Aching   Frequency of pain/symptoms A. Constant   Pain/symptoms are: The same all the time   Pain/symptoms exacerbated by B. Walking;C. Lifting;D. Carrying;G. Certain positions;I. Bending;K. Home tasks;L. Work tasks   Pain/symptoms eased by C. Rest;E. Changing positions;F. Certain positions;H. Cold;I. OTC medication(s);K. Other   Pain eased by comment pain medications   Progression of symptoms since onset: Improved   Prior Level of Function   Prior Level of Function-Mobility fww, SPC   Prior Level of  Function-ADLs independent   Current Level of Function   Current Community Support Family/friend caregiver   Patient role/employment history F. Retired   Living environment House/townhome   Home/community accessibility no stairs within home. 3 TRISHA with railing on R with ascending   Current equipment-Gait/Locomotion Front wheeled walker   Fall Risk Screen   Fall screen completed by PT   Have you fallen 2 or more times in the past year? No   Have you fallen and had an injury in the past year? No   Is patient a fall risk? No   Abuse Screen (yes response referral indicated)   Feels Unsafe at Home or Work/School no   Feels Threatened by Someone no   Does Anyone Try to Keep You From Having Contact with Others or Doing Things Outside Your Home? no   Physical Signs of Abuse Present no   Functional Scales   Functional Scales Other   Other Scales  LEFS: 4/80   Knee Objective Findings   Side (if bilateral, select both right and left) Left   Posture rounded head, forward shoulders   Gait/Locomotion pt amb with short stride length, decreased stance time on L. uses 4ww   Balance/Proprioception (Single Leg Stance) unable   Foot Position In Standing slight hip ER   Knee Special Test Comments special tests NT today per MD diagnosis   Palpation tenderness with min palpation around entire L knee (soft tissues and bony prominances)   Accessory Motion/Joint Mobility hypomobility of L tibiofemoral and patellofemoral jts   Left Knee Extension AROM lacking 12*   Left Knee Extension PROM lacking 12*   Left Knee Flexion AROM 54*   Left Knee Flexion PROM 54*   Left Knee Flexion Strength 3/5   Left Knee Extension Strength 3/5   Left Quad Set Strength poor   L VMO Strength poor   Left Gastrocnemius Flexibility limited   Left Hamstring Flexibility limited   Left Hip Flexor Flexibility limited   Left Quadricep Flexibility limited   Knee ROM Comment pain at end range flexion and extension P/AROM   Step Test Height Control Comment unable    Observation requires increased time for transfers and amb   Planned Therapy Interventions   Planned Therapy Interventions ADL retraining;balance training;bed mobility training;gait training;joint mobilization;manual therapy;motor coordination training;neuromuscular re-education;ROM;strengthening;stretching;transfer training   Planned Modality Interventions   Planned Modality Interventions Cryotherapy;Electrical stimulation;Hot packs;TENS;Ultrasound   Planned Modality Interventions Comments only as needed   Clinical Impression   Criteria for Skilled Therapeutic Interventions Met yes, treatment indicated   PT Diagnosis L knee pain s/p TKA   Influenced by the following impairments decreased ROM, decreased strength, impaired gait, impaired transfers, impaired bed mobility, pain   Functional limitations due to impairments walking, standing, transfers, sitting, bed mobility   Clinical Presentation Stable/Uncomplicated   Clinical Presentation Rationale ROM, strength, LEFS, stable comorbidities, clinical judgement   Clinical Decision Making (Complexity) Low complexity   Therapy Frequency 2 times/Week   Predicted Duration of Therapy Intervention (days/wks) 12 weeks   Risk & Benefits of therapy have been explained Yes   Patient, Family & other staff in agreement with plan of care Yes   Clinical Impression Comments Pt is a 61 y.o. female who presented to the PT clinic today with a rehab diagnosis of L knee pain s/p TKA as evidenced by decreased ROM, decreased strength, impaired gait, impaired transfers, impaired bed mobility, and pain. Pt is appropriate for skilled PT to address previously listed impairments in order to decrease difficulty with standing, walking and squatting.    Education Assessment   Preferred Learning Style Listening;Reading;Demonstration;Pictures/video   Barriers to Learning No barriers   ORTHO GOALS   PT Ortho Eval Goals 1;2;3;4;5   Ortho Goal 1   Goal Identifier 1   Goal Description Pt will be able to  stand for 15 minutes without increase in sx in order to decrease difficulty with ADLs.    Target Date 11/02/21   Ortho Goal 2   Goal Identifier 2   Goal Description Pt will be able to reach 0* L knee extension in order to decrease difficulty with walking without AD.    Target Date 11/16/21   Ortho Goal 3   Goal Identifier 3   Goal Description Pt will be able to flex L knee 110* in order to decrease difficulty with stairs and home navigation.    Target Date 11/30/21   Ortho Goal 4   Goal Identifier 4   Goal Description Pt will be independent with HEP in order to self manage symptoms.    Target Date 12/14/21   Ortho Goal 5   Goal Identifier 5   Goal Description Pt will be able to perform mini squat and demonstrate 5/5 L knee strength in order to decrease difficulty with ADLs.    Target Date 12/31/21   Total Evaluation Time   PT Wilman, Low Complexity Minutes (88571) 10       Please contact me with any questions or concerns.    Thank you for your referral,     Martita Milligan, PT, DPT  Physical Therapist  [address] [address] [phone]

## 2021-10-10 ENCOUNTER — HEALTH MAINTENANCE LETTER (OUTPATIENT)
Age: 61
End: 2021-10-10

## 2021-10-11 ENCOUNTER — PATIENT OUTREACH (OUTPATIENT)
Dept: CARE COORDINATION | Facility: CLINIC | Age: 61
End: 2021-10-11

## 2021-10-11 NOTE — PROGRESS NOTES
Clinic Care Coordination Contact  Care Team Conversations    Patient identified for care management outreach, however pt has had follow up with her orthopedic team. CC SW will wait for new referral to outreach to patient.     Tarsha Wolf, [facility]  Clinic Care Coordinator  [facility]   [phone]

## 2021-10-12 ENCOUNTER — TELEPHONE (OUTPATIENT)
Dept: ORTHOPEDICS | Facility: CLINIC | Age: 61
End: 2021-10-12

## 2021-10-12 ENCOUNTER — HOSPITAL ENCOUNTER (OUTPATIENT)
Dept: PHYSICAL THERAPY | Facility: CLINIC | Age: 61
Setting detail: THERAPIES SERIES
End: 2021-10-12
Attending: ORTHOPAEDIC SURGERY
Payer: COMMERCIAL

## 2021-10-12 DIAGNOSIS — Z98.890 H/O ARTHROSCOPIC KNEE SURGERY: ICD-10-CM

## 2021-10-12 PROCEDURE — 97110 THERAPEUTIC EXERCISES: CPT | Mod: GP | Performed by: PHYSICAL MEDICINE & REHABILITATION

## 2021-10-13 RX ORDER — OXYCODONE HYDROCHLORIDE 5 MG/1
2.5-5 TABLET ORAL EVERY 6 HOURS PRN
Qty: 20 TABLET | Refills: 0 | Status: SHIPPED | OUTPATIENT
Start: 2021-10-13 | End: 2021-11-29

## 2021-10-13 NOTE — TELEPHONE ENCOUNTER
Nicci called this RN to discuss her pain management.  Reviewed medications she is currently using, discussed therapy and phases of recovery.  This RN will facilitate for a refill.  Incision is healing, on antibiotics, no further concerns.  Encouraged Nicci to call with on-going questions/concerns.    JIMBO LinaresN, RN  RN Care Coordinator, Dr. Rodrigo WYATT [facility]

## 2021-10-14 ENCOUNTER — HOSPITAL ENCOUNTER (OUTPATIENT)
Dept: PHYSICAL THERAPY | Facility: CLINIC | Age: 61
Setting detail: THERAPIES SERIES
End: 2021-10-14
Attending: ORTHOPAEDIC SURGERY
Payer: COMMERCIAL

## 2021-10-14 ENCOUNTER — TELEPHONE (OUTPATIENT)
Dept: TRANSPLANT | Facility: CLINIC | Age: 61
End: 2021-10-14

## 2021-10-14 ENCOUNTER — LAB (OUTPATIENT)
Dept: LAB | Facility: CLINIC | Age: 61
End: 2021-10-14
Payer: COMMERCIAL

## 2021-10-14 DIAGNOSIS — Z94.4 LIVER REPLACED BY TRANSPLANT (H): ICD-10-CM

## 2021-10-14 LAB
ALBUMIN SERPL-MCNC: 3.2 G/DL (ref 3.4–5)
ALP SERPL-CCNC: 161 U/L (ref 40–150)
ALT SERPL W P-5'-P-CCNC: 16 U/L (ref 0–50)
ANION GAP SERPL CALCULATED.3IONS-SCNC: 3 MMOL/L (ref 3–14)
AST SERPL W P-5'-P-CCNC: 23 U/L (ref 0–45)
BILIRUB DIRECT SERPL-MCNC: 0.4 MG/DL (ref 0–0.2)
BILIRUB SERPL-MCNC: 1.3 MG/DL (ref 0.2–1.3)
BUN SERPL-MCNC: 38 MG/DL (ref 7–30)
CALCIUM SERPL-MCNC: 9.1 MG/DL (ref 8.5–10.1)
CHLORIDE BLD-SCNC: 107 MMOL/L (ref 94–109)
CO2 SERPL-SCNC: 27 MMOL/L (ref 20–32)
CREAT SERPL-MCNC: 1.47 MG/DL (ref 0.52–1.04)
CYCLOSPORINE BLD LC/MS/MS-MCNC: 228 UG/L (ref 50–400)
ERYTHROCYTE [DISTWIDTH] IN BLOOD BY AUTOMATED COUNT: 12.7 % (ref 10–15)
GFR SERPL CREATININE-BSD FRML MDRD: 38 ML/MIN/1.73M2
GLUCOSE BLD-MCNC: 86 MG/DL (ref 70–99)
HCT VFR BLD AUTO: 35.3 % (ref 35–47)
HGB BLD-MCNC: 12 G/DL (ref 11.7–15.7)
MCH RBC QN AUTO: 33.4 PG (ref 26.5–33)
MCHC RBC AUTO-ENTMCNC: 34 G/DL (ref 31.5–36.5)
MCV RBC AUTO: 98 FL (ref 78–100)
PLATELET # BLD AUTO: 226 10E3/UL (ref 150–450)
POTASSIUM BLD-SCNC: 5 MMOL/L (ref 3.4–5.3)
PROT SERPL-MCNC: 7.4 G/DL (ref 6.8–8.8)
RBC # BLD AUTO: 3.59 10E6/UL (ref 3.8–5.2)
SODIUM SERPL-SCNC: 137 MMOL/L (ref 133–144)
TME LAST DOSE: NORMAL H
TME LAST DOSE: NORMAL H
WBC # BLD AUTO: 5.1 10E3/UL (ref 4–11)

## 2021-10-14 PROCEDURE — 36415 COLL VENOUS BLD VENIPUNCTURE: CPT

## 2021-10-14 PROCEDURE — 85027 COMPLETE CBC AUTOMATED: CPT

## 2021-10-14 PROCEDURE — 80076 HEPATIC FUNCTION PANEL: CPT

## 2021-10-14 PROCEDURE — 80053 COMPREHEN METABOLIC PANEL: CPT

## 2021-10-14 PROCEDURE — 80158 DRUG ASSAY CYCLOSPORINE: CPT

## 2021-10-14 PROCEDURE — 97110 THERAPEUTIC EXERCISES: CPT | Mod: GP | Performed by: PHYSICAL MEDICINE & REHABILITATION

## 2021-10-14 NOTE — TELEPHONE ENCOUNTER
Result note to Nicci:      Alk phos is up a bit.  Message to Dr. Leventhal asking if he wants repeat liver tests next week.  Creatinine is also up.  Make sure you are drinking plenty of fluids.

## 2021-10-15 DIAGNOSIS — Z94.4 LIVER REPLACED BY TRANSPLANT (H): ICD-10-CM

## 2021-10-15 RX ORDER — CYCLOSPORINE 25 MG/1
75 CAPSULE, LIQUID FILLED ORAL EVERY 12 HOURS
Qty: 540 CAPSULE | Refills: 3 | Status: SHIPPED | OUTPATIENT
Start: 2021-10-15 | End: 2022-10-07

## 2021-10-15 NOTE — TELEPHONE ENCOUNTER
ISSUE:   Cyclosporine level 228 on 10/14, goal 120-150, dose 100 mg BID.    PLAN:   Please call patient and confirm this was an accurate 12-hour trough. Verify Cyclosporine dose. Confirm no new medications or illness. Confirm no missed doses. If accurate trough and accurate dose, decrease Cyclosporine dose to 75 mg BID and repeat labs in 1-2 mos.  Zamzam Erickson RN    OUTCOME:   Spoke with patient, they confirm accurate trough level and current dose 100 mg BID. Patient confirmed dose change to 75 mg BID and to repeat labs in 1-2 months. Orders sent to preferred pharmacy for dose change and lab for repeat labs. Patient voiced understanding of plan.     Lauren Zuluaga LPN

## 2021-10-19 ENCOUNTER — HOSPITAL ENCOUNTER (OUTPATIENT)
Dept: PHYSICAL THERAPY | Facility: CLINIC | Age: 61
Setting detail: THERAPIES SERIES
End: 2021-10-19
Attending: ORTHOPAEDIC SURGERY
Payer: COMMERCIAL

## 2021-10-19 DIAGNOSIS — R79.89 ELEVATED LFTS: Primary | ICD-10-CM

## 2021-10-19 PROCEDURE — 97110 THERAPEUTIC EXERCISES: CPT | Mod: GP | Performed by: PHYSICAL MEDICINE & REHABILITATION

## 2021-10-22 ENCOUNTER — TELEPHONE (OUTPATIENT)
Dept: ORTHOPEDICS | Facility: CLINIC | Age: 61
End: 2021-10-22

## 2021-10-25 ENCOUNTER — TELEPHONE (OUTPATIENT)
Dept: TRANSPLANT | Facility: CLINIC | Age: 61
End: 2021-10-25

## 2021-10-26 ENCOUNTER — TELEPHONE (OUTPATIENT)
Dept: ORTHOPEDICS | Facility: CLINIC | Age: 61
End: 2021-10-26

## 2021-10-26 NOTE — TELEPHONE ENCOUNTER
Returned call to Nicci.  Discussed her resp symptoms and need to reschedule. She did get a COVID test today.  She is feeling better.  Clinic appt rescheduled for Monday 11/1.  Nicci will follow up with this RN should she not be able to keep her appt next week.  All questions answered.    JIMBO LinaresN, RN  RN Care Coordinator, Dr. Rodrigo WYATT [facility] Orthopedic [facility]

## 2021-11-01 ENCOUNTER — LAB (OUTPATIENT)
Dept: LAB | Facility: CLINIC | Age: 61
End: 2021-11-01
Payer: COMMERCIAL

## 2021-11-01 ENCOUNTER — OFFICE VISIT (OUTPATIENT)
Dept: ORTHOPEDICS | Facility: CLINIC | Age: 61
End: 2021-11-01
Payer: COMMERCIAL

## 2021-11-01 ENCOUNTER — ANCILLARY PROCEDURE (OUTPATIENT)
Dept: GENERAL RADIOLOGY | Facility: CLINIC | Age: 61
End: 2021-11-01
Attending: ORTHOPAEDIC SURGERY
Payer: COMMERCIAL

## 2021-11-01 VITALS — HEIGHT: 62 IN | BODY MASS INDEX: 43.98 KG/M2 | WEIGHT: 239 LBS

## 2021-11-01 DIAGNOSIS — Z96.652 STATUS POST TOTAL LEFT KNEE REPLACEMENT: Primary | ICD-10-CM

## 2021-11-01 DIAGNOSIS — Z94.4 LIVER REPLACED BY TRANSPLANT (H): ICD-10-CM

## 2021-11-01 DIAGNOSIS — R79.89 ELEVATED LFTS: ICD-10-CM

## 2021-11-01 LAB
ALBUMIN SERPL-MCNC: 3.7 G/DL (ref 3.4–5)
ALP SERPL-CCNC: 140 U/L (ref 40–150)
ALT SERPL W P-5'-P-CCNC: 18 U/L (ref 0–50)
ANION GAP SERPL CALCULATED.3IONS-SCNC: 4 MMOL/L (ref 3–14)
AST SERPL W P-5'-P-CCNC: 21 U/L (ref 0–45)
BILIRUB DIRECT SERPL-MCNC: 0.3 MG/DL (ref 0–0.2)
BILIRUB SERPL-MCNC: 1.2 MG/DL (ref 0.2–1.3)
BUN SERPL-MCNC: 26 MG/DL (ref 7–30)
CALCIUM SERPL-MCNC: 9.4 MG/DL (ref 8.5–10.1)
CHLORIDE BLD-SCNC: 108 MMOL/L (ref 94–109)
CO2 SERPL-SCNC: 27 MMOL/L (ref 20–32)
CREAT SERPL-MCNC: 1.17 MG/DL (ref 0.52–1.04)
CYCLOSPORINE BLD LC/MS/MS-MCNC: 133 UG/L (ref 50–400)
ERYTHROCYTE [DISTWIDTH] IN BLOOD BY AUTOMATED COUNT: 12.4 % (ref 10–15)
GFR SERPL CREATININE-BSD FRML MDRD: 50 ML/MIN/1.73M2
GLUCOSE BLD-MCNC: 92 MG/DL (ref 70–99)
HCT VFR BLD AUTO: 38.8 % (ref 35–47)
HGB BLD-MCNC: 12.6 G/DL (ref 11.7–15.7)
MCH RBC QN AUTO: 32.5 PG (ref 26.5–33)
MCHC RBC AUTO-ENTMCNC: 32.5 G/DL (ref 31.5–36.5)
MCV RBC AUTO: 100 FL (ref 78–100)
PLATELET # BLD AUTO: 183 10E3/UL (ref 150–450)
POTASSIUM BLD-SCNC: 4.6 MMOL/L (ref 3.4–5.3)
PROT SERPL-MCNC: 7.6 G/DL (ref 6.8–8.8)
RBC # BLD AUTO: 3.88 10E6/UL (ref 3.8–5.2)
SODIUM SERPL-SCNC: 139 MMOL/L (ref 133–144)
TME LAST DOSE: NORMAL H
TME LAST DOSE: NORMAL H
WBC # BLD AUTO: 6.8 10E3/UL (ref 4–11)

## 2021-11-01 PROCEDURE — 36415 COLL VENOUS BLD VENIPUNCTURE: CPT | Performed by: PATHOLOGY

## 2021-11-01 PROCEDURE — 82248 BILIRUBIN DIRECT: CPT | Performed by: PATHOLOGY

## 2021-11-01 PROCEDURE — 85027 COMPLETE CBC AUTOMATED: CPT | Performed by: PATHOLOGY

## 2021-11-01 PROCEDURE — 99000 SPECIMEN HANDLING OFFICE-LAB: CPT | Performed by: PATHOLOGY

## 2021-11-01 PROCEDURE — 80158 DRUG ASSAY CYCLOSPORINE: CPT | Mod: 90 | Performed by: PATHOLOGY

## 2021-11-01 PROCEDURE — 80053 COMPREHEN METABOLIC PANEL: CPT | Performed by: PATHOLOGY

## 2021-11-01 PROCEDURE — 99024 POSTOP FOLLOW-UP VISIT: CPT | Mod: GC | Performed by: ORTHOPAEDIC SURGERY

## 2021-11-01 PROCEDURE — 73560 X-RAY EXAM OF KNEE 1 OR 2: CPT | Mod: LT | Performed by: RADIOLOGY

## 2021-11-01 ASSESSMENT — MIFFLIN-ST. JEOR: SCORE: 1602.35

## 2021-11-01 ASSESSMENT — ACTIVITIES OF DAILY LIVING (ADL): FUNCTION,_DAILY_LIVING_SCORE: 45.58

## 2021-11-01 ASSESSMENT — KOOS JR: HOW SEVERE IS YOUR KNEE STIFFNESS AFTER FIRST WAKING IN MORNING: SEVERE

## 2021-11-01 NOTE — LETTER
11/1/2021         RE: Nicci Pemberton  [address]  [place] [address]        Dear Colleague,    Thank you for referring your patient, Nicci Pemberton, to the [facility] [place]. Please see a copy of my visit note below.    Postoperative knee replacement follow-up clinic visit    Nicci Pemberton is doing well after last surgery listed above.    Preoperative knee pain is  Was resolved, has different postoperative pain associated with healing  Analgesic medication: Oxycodone    EXAM:  Incision healing, clean and dry. Inferior 1/3 of incision with scant black eschar scab  Joint range of motion 5-90 degrees, pain free  Effusion: none  Periarticular swelling or leg edema: minor swelling, no erythema  Peroneal and tibial nerve function: intact  Gait: Antalgic on left, use of schroeder to ambulate    Intraoperative cultures:  none     IMAGING:  Radiographs demonstrate the knee implant in satisfactory position without evidence of any complication.    IMPRESSION:  Excellent outcome to date after knee replacement.     PLAN:    Continue range of motion exercises and stretching.    Quadriceps strengthening exercises.    May be weight bearing as tolerated.    Discontinue DVT prophylaxis meds (i.e., warfarin or ASA) if not required for any other reason.    Refill of:  (meds listed above): not needed.    Patient given instructions re: prophylactic antibiotic recommendations during dental work.    Next follow-up visit at 1 year after surgery or sooner as needed.       Forrest Brown DO  Adult Reconstruction Fellow  Dept Orthopaedic Surgery, [facility]    KOOS Knee Survey Assessment    Knee Outcome Survey ADL Scale (NARCISA Trent; George, L; Latrice, RS; Charlie, FH; ASHLEY Tucker; 1998) 10/24/2021   Do you have swelling in your knee? Sometimes   Do you feel grinding, hear clicking or any other type of noise when your knee moves? Never   Does your knee catch or hang up when moving? Never   Can you  straighten your knee fully? Never   Can you bend your knee fully? Never   How severe is your knee joint stiffness after first wakening in the morning? Severe   How severe is your knee stiffness after sitting, lying or resting LATER IN THE DAY? Moderate   How often do you experience knee pain? Daily   Twisting/pivoting on your knee Severe   Straightening knee fully Severe   Bending knee fully Severe   Walking on flat surface Mild   Going up or down stairs Moderate   At night while in bed Moderate   Sitting or lying Mild   Standing upright Mild   Descending stairs Moderate   Ascending stairs Moderate   Rising from sitting Moderate   Standing Mild   Bending to floor/ an object Mild   Walking on flat surface Mild   Getting in/out of car Moderate   Going shopping Moderate   Putting on socks/stockings Moderate   Rising from bed Moderate   Taking off socks/stockings Moderate   Lying in bed (turning over, maintaining knee position) Moderate   Getting in/out of bath Severe   Sitting None   Getting on/off toilet Mild   Heavy domestic duties (moving heavy boxes, scrubbing floors, etc) Moderate   Light domestic duties (cooking, dusting, etc) Mild   Squatting Extreme   Running Extreme   Jumping Extreme   Twisting/pivoting on your injured knee Severe   Kneeling Extreme   How often are you aware of your knee problem? Daily   Have you modified your life style to avoid potentially damaging activities to your knee? Totally   How much are you troubled with lack of confidence in your knee? Moderately   In general, how much difficulty do you have with your knee? Severe   Pain Score 47.22   Symptoms Score 75   Function, Daily Living Score 58.82   Sports and Rec Score 5   Quality of Life Score 25          Attestation signed by Mario Wallace MD at 11/1/2021  2:31 PM:    Attending MD (Dr. Mario Wallace) Attestation :  This patient was seen and evaluated by me including a history, exam, and interpretation of all imaging and/or lab  data.  Either a training physician (resident/fellow), who also saw the patient, or scribe has documented the visit in the attached note.    MD Mike Del Rio Family Professor  Oncology and Adult Reconstructive Surgery  Dept Orthopaedic Surgery, [facility]  [phone] office, [phone] pager  [email or web address]

## 2021-11-01 NOTE — PROGRESS NOTES
Postoperative knee replacement follow-up clinic visit    Nicci Pemberton is doing well after last surgery listed above.    Preoperative knee pain is  Was resolved, has different postoperative pain associated with healing  Analgesic medication: Oxycodone    EXAM:  Incision healing, clean and dry. Inferior 1/3 of incision with scant black eschar scab  Joint range of motion 5-90 degrees, pain free  Effusion: none  Periarticular swelling or leg edema: minor swelling, no erythema  Peroneal and tibial nerve function: intact  Gait: Antalgic on left, use of schroeder to ambulate    Intraoperative cultures:  none     IMAGING:  Radiographs demonstrate the knee implant in satisfactory position without evidence of any complication.    IMPRESSION:  Excellent outcome to date after knee replacement.     PLAN:    Continue range of motion exercises and stretching.    Quadriceps strengthening exercises.    May be weight bearing as tolerated.    Discontinue DVT prophylaxis meds (i.e., warfarin or ASA) if not required for any other reason.    Refill of:  (meds listed above): not needed.    Patient given instructions re: prophylactic antibiotic recommendations during dental work.    Next follow-up visit at 1 year after surgery or sooner as needed.       Forrest Brown DO  Adult Reconstruction Fellow  Dept Orthopaedic Surgery, [facility]    KOOS Knee Survey Assessment    Knee Outcome Survey ADL Scale (Miley, JTANG; George, L; Latrice, RS; Charlie, FH; Caitlin, CD; 1998) 10/24/2021   Do you have swelling in your knee? Sometimes   Do you feel grinding, hear clicking or any other type of noise when your knee moves? Never   Does your knee catch or hang up when moving? Never   Can you straighten your knee fully? Never   Can you bend your knee fully? Never   How severe is your knee joint stiffness after first wakening in the morning? Severe   How severe is your knee stiffness after sitting, lying or resting LATER IN THE DAY? Moderate   How  often do you experience knee pain? Daily   Twisting/pivoting on your knee Severe   Straightening knee fully Severe   Bending knee fully Severe   Walking on flat surface Mild   Going up or down stairs Moderate   At night while in bed Moderate   Sitting or lying Mild   Standing upright Mild   Descending stairs Moderate   Ascending stairs Moderate   Rising from sitting Moderate   Standing Mild   Bending to floor/ an object Mild   Walking on flat surface Mild   Getting in/out of car Moderate   Going shopping Moderate   Putting on socks/stockings Moderate   Rising from bed Moderate   Taking off socks/stockings Moderate   Lying in bed (turning over, maintaining knee position) Moderate   Getting in/out of bath Severe   Sitting None   Getting on/off toilet Mild   Heavy domestic duties (moving heavy boxes, scrubbing floors, etc) Moderate   Light domestic duties (cooking, dusting, etc) Mild   Squatting Extreme   Running Extreme   Jumping Extreme   Twisting/pivoting on your injured knee Severe   Kneeling Extreme   How often are you aware of your knee problem? Daily   Have you modified your life style to avoid potentially damaging activities to your knee? Totally   How much are you troubled with lack of confidence in your knee? Moderately   In general, how much difficulty do you have with your knee? Severe   Pain Score 47.22   Symptoms Score 75   Function, Daily Living Score 58.82   Sports and Rec Score 5   Quality of Life Score 25

## 2021-11-02 ENCOUNTER — HOSPITAL ENCOUNTER (OUTPATIENT)
Dept: PHYSICAL THERAPY | Facility: CLINIC | Age: 61
Setting detail: THERAPIES SERIES
End: 2021-11-02
Attending: ORTHOPAEDIC SURGERY
Payer: COMMERCIAL

## 2021-11-02 PROCEDURE — 97110 THERAPEUTIC EXERCISES: CPT | Mod: GP | Performed by: PHYSICAL MEDICINE & REHABILITATION

## 2021-11-02 PROCEDURE — 97140 MANUAL THERAPY 1/> REGIONS: CPT | Mod: GP | Performed by: PHYSICAL MEDICINE & REHABILITATION

## 2021-11-04 ENCOUNTER — HOSPITAL ENCOUNTER (OUTPATIENT)
Dept: PHYSICAL THERAPY | Facility: CLINIC | Age: 61
Setting detail: THERAPIES SERIES
End: 2021-11-04
Attending: ORTHOPAEDIC SURGERY
Payer: COMMERCIAL

## 2021-11-04 DIAGNOSIS — Z96.652 STATUS POST TOTAL LEFT KNEE REPLACEMENT: Primary | ICD-10-CM

## 2021-11-04 PROCEDURE — 97140 MANUAL THERAPY 1/> REGIONS: CPT | Mod: GP | Performed by: PHYSICAL MEDICINE & REHABILITATION

## 2021-11-04 PROCEDURE — 97110 THERAPEUTIC EXERCISES: CPT | Mod: GP | Performed by: PHYSICAL MEDICINE & REHABILITATION

## 2021-11-04 RX ORDER — CEPHALEXIN 500 MG/1
CAPSULE ORAL
Qty: 56 CAPSULE | Refills: 0 | Status: SHIPPED | OUTPATIENT
Start: 2021-11-04 | End: 2021-11-10

## 2021-11-08 ENCOUNTER — PREP FOR PROCEDURE (OUTPATIENT)
Dept: ORTHOPEDICS | Facility: CLINIC | Age: 61
End: 2021-11-08
Payer: COMMERCIAL

## 2021-11-08 ENCOUNTER — TELEPHONE (OUTPATIENT)
Dept: ORTHOPEDICS | Facility: CLINIC | Age: 61
End: 2021-11-08
Payer: COMMERCIAL

## 2021-11-08 DIAGNOSIS — Z96.659 STATUS POST TOTAL KNEE REPLACEMENT: Primary | ICD-10-CM

## 2021-11-08 NOTE — TELEPHONE ENCOUNTER
Follow up call to Nicci after picture sent of wound.  After discussing with , she will need an I&D of her left knee.  Nicci is attempting to get a COVID test done today, she will follow up with this RN tomorrow if unable.  Nicci will attempt to set up a preop, otherwise it will be done by Dr. Wallace day of surgery.  Nicci aware that this is a same day procedure, this RN will follow up with Nicci tomorrow to confirm final surgical details and complete preop surgical teaching.    Shala Hazel, BSN, RN  RN Care Coordinator, Dr. Rodrigo WYATT [facility]

## 2021-11-09 ENCOUNTER — LAB (OUTPATIENT)
Dept: LAB | Facility: CLINIC | Age: 61
End: 2021-11-09
Attending: ORTHOPAEDIC SURGERY
Payer: COMMERCIAL

## 2021-11-09 ENCOUNTER — TELEPHONE (OUTPATIENT)
Dept: ORTHOPEDICS | Facility: CLINIC | Age: 61
End: 2021-11-09
Payer: COMMERCIAL

## 2021-11-09 ENCOUNTER — ANESTHESIA EVENT (OUTPATIENT)
Dept: SURGERY | Facility: AMBULATORY SURGERY CENTER | Age: 61
End: 2021-11-09
Payer: COMMERCIAL

## 2021-11-09 DIAGNOSIS — Z96.659 STATUS POST TOTAL KNEE REPLACEMENT: ICD-10-CM

## 2021-11-09 LAB — SARS-COV-2 RNA RESP QL NAA+PROBE: NEGATIVE

## 2021-11-09 PROCEDURE — U0005 INFEC AGEN DETEC AMPLI PROBE: HCPCS | Mod: 90 | Performed by: PATHOLOGY

## 2021-11-09 PROCEDURE — U0003 INFECTIOUS AGENT DETECTION BY NUCLEIC ACID (DNA OR RNA); SEVERE ACUTE RESPIRATORY SYNDROME CORONAVIRUS 2 (SARS-COV-2) (CORONAVIRUS DISEASE [COVID-19]), AMPLIFIED PROBE TECHNIQUE, MAKING USE OF HIGH THROUGHPUT TECHNOLOGIES AS DESCRIBED BY CMS-2020-01-R: HCPCS | Mod: 90 | Performed by: PATHOLOGY

## 2021-11-09 RX ORDER — CEFAZOLIN SODIUM 2 G/50ML
2 SOLUTION INTRAVENOUS
Status: CANCELLED | OUTPATIENT
Start: 2021-11-09

## 2021-11-09 RX ORDER — CEFAZOLIN SODIUM 2 G/50ML
2 SOLUTION INTRAVENOUS SEE ADMIN INSTRUCTIONS
Status: CANCELLED | OUTPATIENT
Start: 2021-11-09

## 2021-11-09 RX ORDER — ACETAMINOPHEN 325 MG/1
975 TABLET ORAL ONCE
Status: CANCELLED | OUTPATIENT
Start: 2021-11-09 | End: 2021-11-09

## 2021-11-09 ASSESSMENT — LIFESTYLE VARIABLES: TOBACCO_USE: 1

## 2021-11-09 NOTE — TELEPHONE ENCOUNTER
Nicci returned call to this RN.  Discussed arrival time and NPO for procedure tomorrow.  Discussed showering procedure, this RN will facilitate bringing soap to lab appt today when Nicci is at the [facility] for her COVID appt.  This RN explained the procedure and the flow of her surgical day, potential outcomes.  Nicci verbalized understanding.   Nicci had a call coming in from [facility], will follow up with this RN with further questions.    JIMBO LinaresN, RN  RN Care Coordinator, Dr. Rodrigo WYATT [facility] Orthopedic [facility]

## 2021-11-09 NOTE — ANESTHESIA PREPROCEDURE EVALUATION
Anesthesia Pre-Procedure Evaluation    Patient: Nicci Pemberton   MRN: [id number] : 1960        Preoperative Diagnosis: Status post total knee replacement [Z96.659]    Procedure : Procedure(s):  Irrigation and debridement Left knee          Past Medical History:   Diagnosis Date     Anemia      Cellulitis      Cirrhosis of liver (H) 2018     Gastroesophageal reflux disease      Heterozygous alpha 1-antitrypsin deficiency (H)      High hepatic iron concentration determined by biopsy of liver      Liver cirrhosis secondary to ANGULO (H)      Liver transplanted (H) 2019     Lymphedema      Osteoarthritis     knees     Other chronic pain     Left knee     SBP (spontaneous bacterial peritonitis) (H) 2019 in [place]     Thrombocytopenia (H) 2020     Thyroid disease     Hypothyroid      Past Surgical History:   Procedure Laterality Date     ARTHROPLASTY KNEE Right 10/23/2020    Procedure: Right  total knee arthroplasty;  Surgeon: Mario Wallace MD;  [facility]     ARTHROPLASTY KNEE Left 2021    Procedure: Left total knee arthroplasty;  Surgeon: Mario Wallace MD;  [facility]     BENCH LIVER N/A 2019    Procedure: BACKBENCH PREPARATION, LIVER;  Surgeon: Mandeep Alford MD;  [facility]     COLONOSCOPY N/A 2018    Procedure: COLONOSCOPY;  colonoscopy;  Surgeon: Hugo Patel MD;  [facility]     ENDOSCOPIC RETROGRADE CHOLANGIOPANCREATOGRAM N/A 2/10/2020    Procedure: ENDOSCOPIC RETROGRADE CHOLANGIOPANCREATOGRAPHY with spinchterotomy;  Surgeon: Guru Rigoberto Canada MD;  [facility]     ENDOSCOPIC RETROGRADE CHOLANGIOPANCREATOGRAM N/A 2020    Procedure: ENDOSCOPIC RETROGRADE CHOLANGIOPANCREATOGRAPHY,Stent exchange and sludge removal;  Surgeon: Guru Rigoberto Canada MD;  [facility]     ENDOSCOPIC RETROGRADE CHOLANGIOPANCREATOGRAM N/A 2021    Procedure: ENDOSCOPIC RETROGRADE CHOLANGIOPANCREATOGRAPHY,  SPINCTEROTOMY, DEBRIDE REMOVAL, STENT PLACEMENT;  Surgeon: Guru Rigoberto Canada MD;  [facility]     ENDOSCOPIC RETROGRADE CHOLANGIOPANCREATOGRAPHY, EXCHANGE TUBE/STENT N/A 3/4/2020    Procedure: ENDOSCOPIC RETROGRADE CHOLANGIOPANCREATOGRAPHY, WITH biliary dilarion, stent exchange, stone removal;  Surgeon: Guru Rigoberto Canada MD;  [facility]     ENDOSCOPIC RETROGRADE CHOLANGIOPANCREATOGRAPHY, EXCHANGE TUBE/STENT N/A 2021    Procedure: ENDOSCOPIC RETROGRADE CHOLANGIOPANCREATOGRAPHY WITH BILIARY STENT EXCHANGE, DILATION AND SLUDGE/STONE REMOVAL;  Surgeon: Guru Rigoberto Canada MD;  [facility]     ESOPHAGOSCOPY, GASTROSCOPY, DUODENOSCOPY (EGD), COMBINED N/A 10/31/2019    Procedure: Esophagogastroduodenoscopy, With Biopsy;  Surgeon: Nerissa Aranda MD;  [facility]     IR FEEDING TUBE PLACEMENT W FLUORO/MD  2019     IR FLUORO 0-1 HOUR  2019     IR LIVER BIOPSY PERCUTANEOUS  3/25/2021     IR LUMBAR PUNCTURE  2019     KNEE SURGERY  10/23/20     TRANSPLANT LIVER RECIPIENT  DONOR N/A 2019    Procedure: TRANSPLANT, LIVER, RECIPIENT,  DONOR;  Surgeon: Mandeep Alford MD;  [facility]     US PARACENTESIS  2019     US PARACENTESIS WITH ALBUMIN  2019      Allergies   Allergen Reactions     Ciprofloxacin Dizziness and Nausea     Food      Fruits with cores and pits  Apples     Sulfa Drugs Other (See Comments)     Swollen joints     Sulfamethoxazole-Trimethoprim      Other reaction(s): Unknown     Furosemide Rash     Other reaction(s): rash  14      Social History     Tobacco Use     Smoking status: Former Smoker     Packs/day: 0.50     Years: 10.00     Pack years: 5.00     Types: Cigarettes     Start date: 2000     Quit date:      Years since quitting: 10.1     Smokeless tobacco: Never Used   Substance Use Topics     Alcohol use: No     Comment: due to liver transplant,       Wt  Readings from Last 1 Encounters:   11/01/21 108.4 kg (239 lb)        Anesthesia Evaluation   Pt has had prior anesthetic. Type: General.    No history of anesthetic complications       ROS/MED HX  ENT/Pulmonary:     (+) DIXIE risk factors, obese, tobacco use, Past use,     Neurologic:  - neg neurologic ROS     Cardiovascular:  - neg cardiovascular ROS     METS/Exercise Tolerance:     Hematologic:     (+) history of blood transfusion, previous transfusion reaction, Known PRBC Anitbodies: Yes, - Liver transplant,     Musculoskeletal: Comment: S/p right TKA 10/2020  (+) arthritis,     GI/Hepatic: Comment: S/p liver transplant 2019, s/p ERCP CBD stenosis      Renal/Genitourinary:     (+) renal disease, type: CRI, Pt does not require dialysis,     Endo:     (+) Obesity,     Psychiatric/Substance Use:  - neg psychiatric ROS     Infectious Disease:  - neg infectious disease ROS     Malignancy:       Other:            Physical Exam    Airway        Mallampati: II   TM distance: > 3 FB      Respiratory Devices and Support         Dental           Cardiovascular          Rhythm and rate: regular and normal     Pulmonary           breath sounds clear to auscultation           OUTSIDE LABS:  CBC:   Lab Results   Component Value Date    WBC 6.8 11/01/2021    WBC 5.1 10/14/2021    HGB 12.6 11/01/2021    HGB 12.0 10/14/2021    HCT 38.8 11/01/2021    HCT 35.3 10/14/2021     11/01/2021     10/14/2021     BMP:   Lab Results   Component Value Date     11/01/2021     10/14/2021    POTASSIUM 4.6 11/01/2021    POTASSIUM 5.0 10/14/2021    CHLORIDE 108 11/01/2021    CHLORIDE 107 10/14/2021    CO2 27 11/01/2021    CO2 27 10/14/2021    BUN 26 11/01/2021    BUN 38 (H) 10/14/2021    CR 1.17 (H) 11/01/2021    CR 1.47 (H) 10/14/2021    GLC 92 11/01/2021    GLC 86 10/14/2021     COAGS:   Lab Results   Component Value Date    PTT 30 09/24/2021    INR 0.91 09/24/2021    FIBR 682 (H) 12/10/2020     POC:   Lab Results    Component Value Date    BGM 98 05/12/2021    HCGS Negative 06/18/2018     HEPATIC:   Lab Results   Component Value Date    ALBUMIN 3.7 11/01/2021    PROTTOTAL 7.6 11/01/2021    ALT 18 11/01/2021    AST 21 11/01/2021    ALKPHOS 140 11/01/2021    BILITOTAL 1.2 11/01/2021    DOROTHEA 23 09/04/2019     OTHER:   Lab Results   Component Value Date    PH 7.46 (H) 09/11/2019    LACT 0.8 10/28/2020    A1C 5.0 08/18/2019    JACOB 9.4 11/01/2021    PHOS 3.8 07/28/2020    MAG 1.8 12/06/2020    LIPASE 46 (L) 05/12/2021    AMYLASE 44 05/12/2021    TSH 0.94 08/19/2021    T4 0.70 (L) 09/11/2019    CRP 51.6 (H) 12/10/2020     (H) 10/28/2020       Anesthesia Plan    ASA Status:  3   NPO Status:  NPO Appropriate    Anesthesia Type: General.     - Airway: LMA   Induction: Intravenous.   Maintenance: TIVA.        Consents    Anesthesia Plan(s) and associated risks, benefits, and realistic alternatives discussed. Questions answered and patient/representative(s) expressed understanding.     - Discussed with:  Patient         Postoperative Care    Pain management: Oral pain medications.   PONV prophylaxis: Ondansetron (or other 5HT-3), Dexamethasone or Solumedrol     Comments:                Ankur John MD

## 2021-11-09 NOTE — TELEPHONE ENCOUNTER
Nicci called this RN to ask if there will be changes to her PT post-op. This RN discussed that this is planned to be just an I&D, should not change her recovery.  This RN encouraged her to speak with Dr. Wallace tomorrow and confirm what her post-op recovery will be.  All questions answered.    TOI Linares, RN  RN Care Coordinator, Dr. Rodrigo WYATT [facility]

## 2021-11-10 ENCOUNTER — ANESTHESIA (OUTPATIENT)
Dept: SURGERY | Facility: AMBULATORY SURGERY CENTER | Age: 61
End: 2021-11-10
Payer: COMMERCIAL

## 2021-11-10 ENCOUNTER — HOSPITAL ENCOUNTER (OUTPATIENT)
Facility: AMBULATORY SURGERY CENTER | Age: 61
End: 2021-11-10
Attending: ORTHOPAEDIC SURGERY
Payer: COMMERCIAL

## 2021-11-10 VITALS
HEIGHT: 62 IN | OXYGEN SATURATION: 97 % | HEART RATE: 61 BPM | RESPIRATION RATE: 16 BRPM | SYSTOLIC BLOOD PRESSURE: 138 MMHG | WEIGHT: 221 LBS | TEMPERATURE: 98.4 F | DIASTOLIC BLOOD PRESSURE: 83 MMHG | BODY MASS INDEX: 40.67 KG/M2

## 2021-11-10 DIAGNOSIS — M25.562 CHRONIC PAIN OF LEFT KNEE: Primary | ICD-10-CM

## 2021-11-10 DIAGNOSIS — Z96.659 STATUS POST TOTAL KNEE REPLACEMENT: ICD-10-CM

## 2021-11-10 DIAGNOSIS — G89.29 CHRONIC PAIN OF LEFT KNEE: Primary | ICD-10-CM

## 2021-11-10 PROCEDURE — 87181 SC STD AGAR DILUTION PER AGT: CPT | Performed by: PATHOLOGY

## 2021-11-10 PROCEDURE — 13160 SEC CLSR SURG WND/DEHSN XTN: CPT | Mod: 78 | Performed by: ORTHOPAEDIC SURGERY

## 2021-11-10 PROCEDURE — 87076 CULTURE ANAEROBE IDENT EACH: CPT | Performed by: PATHOLOGY

## 2021-11-10 PROCEDURE — 99000 SPECIMEN HANDLING OFFICE-LAB: CPT | Performed by: PATHOLOGY

## 2021-11-10 PROCEDURE — 87077 CULTURE AEROBIC IDENTIFY: CPT | Performed by: PATHOLOGY

## 2021-11-10 PROCEDURE — 87186 SC STD MICRODIL/AGAR DIL: CPT | Mod: XU | Performed by: PATHOLOGY

## 2021-11-10 PROCEDURE — 13160 SEC CLSR SURG WND/DEHSN XTN: CPT | Mod: 78,LT

## 2021-11-10 PROCEDURE — 87102 FUNGUS ISOLATION CULTURE: CPT | Performed by: PATHOLOGY

## 2021-11-10 PROCEDURE — 87070 CULTURE OTHR SPECIMN AEROBIC: CPT | Performed by: PATHOLOGY

## 2021-11-10 PROCEDURE — 87075 CULTR BACTERIA EXCEPT BLOOD: CPT | Performed by: PATHOLOGY

## 2021-11-10 RX ORDER — ALBUTEROL SULFATE 0.83 MG/ML
2.5 SOLUTION RESPIRATORY (INHALATION) EVERY 4 HOURS PRN
Status: DISCONTINUED | OUTPATIENT
Start: 2021-11-10 | End: 2021-11-10 | Stop reason: HOSPADM

## 2021-11-10 RX ORDER — CEPHALEXIN 500 MG/1
500 CAPSULE ORAL 4 TIMES DAILY
Qty: 40 CAPSULE | Refills: 0 | Status: SHIPPED | OUTPATIENT
Start: 2021-11-10 | End: 2021-11-10

## 2021-11-10 RX ORDER — OXYCODONE HYDROCHLORIDE 5 MG/1
5-10 TABLET ORAL EVERY 4 HOURS PRN
Qty: 10 TABLET | Refills: 0 | Status: SHIPPED | OUTPATIENT
Start: 2021-11-10 | End: 2021-12-01

## 2021-11-10 RX ORDER — SODIUM CHLORIDE, SODIUM LACTATE, POTASSIUM CHLORIDE, CALCIUM CHLORIDE 600; 310; 30; 20 MG/100ML; MG/100ML; MG/100ML; MG/100ML
INJECTION, SOLUTION INTRAVENOUS CONTINUOUS
Status: DISCONTINUED | OUTPATIENT
Start: 2021-11-10 | End: 2021-11-11 | Stop reason: HOSPADM

## 2021-11-10 RX ORDER — CEPHALEXIN 500 MG/1
500 CAPSULE ORAL 4 TIMES DAILY
Qty: 40 CAPSULE | Refills: 0 | Status: SHIPPED | OUTPATIENT
Start: 2021-11-10 | End: 2021-11-29

## 2021-11-10 RX ORDER — DEXAMETHASONE SODIUM PHOSPHATE 4 MG/ML
INJECTION, SOLUTION INTRA-ARTICULAR; INTRALESIONAL; INTRAMUSCULAR; INTRAVENOUS; SOFT TISSUE PRN
Status: DISCONTINUED | OUTPATIENT
Start: 2021-11-10 | End: 2021-11-10

## 2021-11-10 RX ORDER — OXYCODONE HYDROCHLORIDE 5 MG/1
5 TABLET ORAL
Status: DISCONTINUED | OUTPATIENT
Start: 2021-11-10 | End: 2021-11-11 | Stop reason: HOSPADM

## 2021-11-10 RX ORDER — ONDANSETRON 2 MG/ML
4 INJECTION INTRAMUSCULAR; INTRAVENOUS EVERY 30 MIN PRN
Status: DISCONTINUED | OUTPATIENT
Start: 2021-11-10 | End: 2021-11-11 | Stop reason: HOSPADM

## 2021-11-10 RX ORDER — MEPERIDINE HYDROCHLORIDE 25 MG/ML
12.5 INJECTION INTRAMUSCULAR; INTRAVENOUS; SUBCUTANEOUS
Status: DISCONTINUED | OUTPATIENT
Start: 2021-11-10 | End: 2021-11-11 | Stop reason: HOSPADM

## 2021-11-10 RX ORDER — ACETAMINOPHEN 325 MG/1
975 TABLET ORAL ONCE
Status: COMPLETED | OUTPATIENT
Start: 2021-11-10 | End: 2021-11-10

## 2021-11-10 RX ORDER — ONDANSETRON 4 MG/1
4 TABLET, ORALLY DISINTEGRATING ORAL EVERY 30 MIN PRN
Status: DISCONTINUED | OUTPATIENT
Start: 2021-11-10 | End: 2021-11-11 | Stop reason: HOSPADM

## 2021-11-10 RX ORDER — GABAPENTIN 300 MG/1
300 CAPSULE ORAL
Status: COMPLETED | OUTPATIENT
Start: 2021-11-10 | End: 2021-11-10

## 2021-11-10 RX ORDER — FENTANYL CITRATE 50 UG/ML
INJECTION, SOLUTION INTRAMUSCULAR; INTRAVENOUS PRN
Status: DISCONTINUED | OUTPATIENT
Start: 2021-11-10 | End: 2021-11-10

## 2021-11-10 RX ORDER — SODIUM CHLORIDE, SODIUM LACTATE, POTASSIUM CHLORIDE, CALCIUM CHLORIDE 600; 310; 30; 20 MG/100ML; MG/100ML; MG/100ML; MG/100ML
INJECTION, SOLUTION INTRAVENOUS CONTINUOUS
Status: DISCONTINUED | OUTPATIENT
Start: 2021-11-10 | End: 2021-11-10 | Stop reason: HOSPADM

## 2021-11-10 RX ORDER — FENTANYL CITRATE 50 UG/ML
50 INJECTION, SOLUTION INTRAMUSCULAR; INTRAVENOUS EVERY 5 MIN PRN
Status: DISCONTINUED | OUTPATIENT
Start: 2021-11-10 | End: 2021-11-10 | Stop reason: HOSPADM

## 2021-11-10 RX ORDER — PROPOFOL 10 MG/ML
INJECTION, EMULSION INTRAVENOUS CONTINUOUS PRN
Status: DISCONTINUED | OUTPATIENT
Start: 2021-11-10 | End: 2021-11-10

## 2021-11-10 RX ORDER — ONDANSETRON 2 MG/ML
INJECTION INTRAMUSCULAR; INTRAVENOUS PRN
Status: DISCONTINUED | OUTPATIENT
Start: 2021-11-10 | End: 2021-11-10

## 2021-11-10 RX ORDER — OXYCODONE HYDROCHLORIDE 5 MG/1
5 TABLET ORAL EVERY 4 HOURS PRN
Status: DISCONTINUED | OUTPATIENT
Start: 2021-11-10 | End: 2021-11-11 | Stop reason: HOSPADM

## 2021-11-10 RX ORDER — HYDROMORPHONE HYDROCHLORIDE 1 MG/ML
0.2 INJECTION, SOLUTION INTRAMUSCULAR; INTRAVENOUS; SUBCUTANEOUS EVERY 5 MIN PRN
Status: DISCONTINUED | OUTPATIENT
Start: 2021-11-10 | End: 2021-11-10 | Stop reason: HOSPADM

## 2021-11-10 RX ORDER — ACETAMINOPHEN 325 MG/1
650 TABLET ORAL
Status: DISCONTINUED | OUTPATIENT
Start: 2021-11-10 | End: 2021-11-11 | Stop reason: HOSPADM

## 2021-11-10 RX ORDER — SODIUM CHLORIDE, SODIUM LACTATE, POTASSIUM CHLORIDE, CALCIUM CHLORIDE 600; 310; 30; 20 MG/100ML; MG/100ML; MG/100ML; MG/100ML
INJECTION, SOLUTION INTRAVENOUS CONTINUOUS PRN
Status: DISCONTINUED | OUTPATIENT
Start: 2021-11-10 | End: 2021-11-10

## 2021-11-10 RX ORDER — PROPOFOL 10 MG/ML
INJECTION, EMULSION INTRAVENOUS PRN
Status: DISCONTINUED | OUTPATIENT
Start: 2021-11-10 | End: 2021-11-10

## 2021-11-10 RX ORDER — LIDOCAINE 40 MG/G
CREAM TOPICAL
Status: DISCONTINUED | OUTPATIENT
Start: 2021-11-10 | End: 2021-11-10 | Stop reason: HOSPADM

## 2021-11-10 RX ORDER — CEFAZOLIN SODIUM 1 G/3ML
INJECTION, POWDER, FOR SOLUTION INTRAMUSCULAR; INTRAVENOUS PRN
Status: DISCONTINUED | OUTPATIENT
Start: 2021-11-10 | End: 2021-11-10

## 2021-11-10 RX ADMIN — FENTANYL CITRATE 25 MCG: 50 INJECTION, SOLUTION INTRAMUSCULAR; INTRAVENOUS at 07:58

## 2021-11-10 RX ADMIN — FENTANYL CITRATE 50 MCG: 50 INJECTION, SOLUTION INTRAMUSCULAR; INTRAVENOUS at 08:22

## 2021-11-10 RX ADMIN — ACETAMINOPHEN 975 MG: 325 TABLET ORAL at 06:29

## 2021-11-10 RX ADMIN — SODIUM CHLORIDE, SODIUM LACTATE, POTASSIUM CHLORIDE, CALCIUM CHLORIDE: 600; 310; 30; 20 INJECTION, SOLUTION INTRAVENOUS at 07:22

## 2021-11-10 RX ADMIN — HYDROMORPHONE HYDROCHLORIDE 0.2 MG: 1 INJECTION, SOLUTION INTRAMUSCULAR; INTRAVENOUS; SUBCUTANEOUS at 08:37

## 2021-11-10 RX ADMIN — FENTANYL CITRATE 25 MCG: 50 INJECTION, SOLUTION INTRAMUSCULAR; INTRAVENOUS at 07:46

## 2021-11-10 RX ADMIN — GABAPENTIN 300 MG: 300 CAPSULE ORAL at 06:29

## 2021-11-10 RX ADMIN — PROPOFOL 20 MG: 10 INJECTION, EMULSION INTRAVENOUS at 07:40

## 2021-11-10 RX ADMIN — ONDANSETRON 4 MG: 2 INJECTION INTRAMUSCULAR; INTRAVENOUS at 08:01

## 2021-11-10 RX ADMIN — DEXAMETHASONE SODIUM PHOSPHATE 4 MG: 4 INJECTION, SOLUTION INTRA-ARTICULAR; INTRALESIONAL; INTRAMUSCULAR; INTRAVENOUS; SOFT TISSUE at 07:43

## 2021-11-10 RX ADMIN — CEFAZOLIN SODIUM 2 G: 1 INJECTION, POWDER, FOR SOLUTION INTRAMUSCULAR; INTRAVENOUS at 07:59

## 2021-11-10 RX ADMIN — PROPOFOL 150 MG: 10 INJECTION, EMULSION INTRAVENOUS at 07:33

## 2021-11-10 RX ADMIN — OXYCODONE HYDROCHLORIDE 5 MG: 5 TABLET ORAL at 08:38

## 2021-11-10 RX ADMIN — PROPOFOL 200 MCG/KG/MIN: 10 INJECTION, EMULSION INTRAVENOUS at 07:34

## 2021-11-10 RX ADMIN — PROPOFOL 20 MG: 10 INJECTION, EMULSION INTRAVENOUS at 07:44

## 2021-11-10 RX ADMIN — FENTANYL CITRATE 50 MCG: 50 INJECTION, SOLUTION INTRAMUSCULAR; INTRAVENOUS at 08:09

## 2021-11-10 ASSESSMENT — MIFFLIN-ST. JEOR: SCORE: 1520.7

## 2021-11-10 NOTE — DISCHARGE INSTRUCTIONS
[facility]  Home Care Following Anesthesia  For 24 hours after surgery:  1. Get plenty of rest.  A responsible adult must stay with you for at least 24 hours after you leave the surgery center.  2. Do not drive or use heavy equipment.  If you have weakness or tingling, don't drive or use heavy equipment until this feeling goes away.   3. Do not drink alcohol.   4. Avoid strenuous or risky activities.  Ask for help when climbing stairs.  5. You may feel lightheaded.  IF so, sit for a few minutes before standing.  Have someone help you get up.   6. If you have nausea (feel sick to your stomach): Drink only clear liquids such as apple juice, ginger ale, broth or 7-Up.  Rest may also help.  Be sure to drink enough fluids.  Move to a regular diet as you feel able.   7. You may have a slight fever.  Call the doctor if your fever is over 100 F (37.7 C) (taken under the tongue) or lasts longer than 24 hours.  8. You may have a dry mouth, a sore throat, muscle aches or trouble sleeping. These should go away after 24 hours.  9. Do not make important or legal decisions.   10. It is recommended to avoid smoking.               Tips for taking pain medications  To get the best pain relief possible, remember these points:    Take pain medications as directed, before pain becomes severe.    Pain medication can upset your stomach: taking it with food may help.    Constipation is a common side effect of pain medication. Drink plenty of  fluids.    Eat foods high in fiber. Take a stool softener if recommended by your doctor or pharmacist.    Do not drink alcohol, drive or operate machinery while taking pain medications.    Ask about other ways to control pain, such as with heat, ice or relaxation.    Tylenol/Acetaminophen Consumption  To help encourage the safe use of acetaminophen, the makers of TYLENOL  have lowered the maximum daily dose for single-ingredient Extra Strength TYLENOL   (acetaminophen) products sold in the [place] from 8 pills per day (4,000 mg) to 6 pills per day (3,000 mg). The dosing interval has also changed from 2 pills every 4-6 hours to 2 pills every 6 hours.    If you feel your pain relief is insufficient, you may take Tylenol/Acetaminophen in addition to your narcotic pain medication.     Be careful not to exceed 3,000 mg of Tylenol/Acetaminophen in a 24 hour period from all sources.    If you are taking extra strength Tylenol/acetaminophen (500 mg), the maximum dose is 6 tablets in 24 hours.    If you are taking regular strength acetaminophen (325 mg), the maximum dose is 9 tablets in 24 hours.    Call a doctor for any of the followin. Signs of infection (fever, growing tenderness at the surgery site, a large amount of drainage or bleeding, severe pain, foul-smelling drainage, redness, swelling).  2. It has been over 8 to 10 hours since surgery and you are still not able to urinate (pass water).  3. Headache for over 24 hours.  4. Numbness, tingling or weakness the day after surgery (if you had spinal anesthesia).  5. Signs of Covid-19 infection (temperature over 100 degrees, shortness of breath, cough, loss of taste/smell, generalized body aches, persistent headache, chills, sore throat, nausea/vomiting/diarrhea)  Your doctor is:     Dr.E. Wallace                    Or dial [phone] and ask for the resident on call for:  Orthopaedics  For emergency care, call the:  [facility] Emergency Department: [phone] (TTY for hearing impaired: [phone])                 AEB meeting 0% EER

## 2021-11-10 NOTE — ANESTHESIA PROCEDURE NOTES
Airway       Patient location during procedure: OR       Procedure Start/Stop Times: 11/10/2021 7:48 AM  Staff -        CRNA: Ernestina Wilkerson APRN CRNA       Performed By: CRNA  Consent for Airway        Urgency: elective  Indications and Patient Condition       Indications for airway management: jessi-procedural         Mask difficulty assessment: 1 - vent by mask    Final Airway Details       Final airway type: supraglottic airway    Supraglottic Airway Details        Type: LMA       Brand: LMA Unique       LMA size: 4    Post intubation assessment        Placement verified by: capnometry and chest rise        Number of attempts at approach: 1       Number of other approaches attempted: 0       Secured with: pink tape       Ease of procedure: easy       Dentition: Intact

## 2021-11-10 NOTE — BRIEF OP NOTE
[facility]    Brief Operative Note    Pre-operative diagnosis: Status post total knee replacement [Z96.659]  Post-operative diagnosis Same as pre-operative diagnosis    Procedure: Procedure(s):  Irrigation and debridement Left knee skin subcutaneous tissue (length: 10cm), Application of wound vac  Surgeon: Surgeon(s) and Role:     * Mario Wallace MD - Primary     * Forrest Brown MD - Fellow - Assisting  Anesthesia: General   Estimated Blood Loss: 2 mL from 11/10/2021  7:25 AM to 11/10/2021  8:12 AM      Drains: None  Specimens:   ID Type Source Tests Collected by Time Destination   A : Left knee Incision Tissue Knee, Left ANAEROBIC BACTERIAL CULTURE ROUTINE, FUNGAL OR YEAST CULTURE ROUTINE, AEROBIC BACTERIAL CULTURE ROUTINE Mario Wallace MD 11/10/2021  7:46 AM    B : Left knee Incision #2 Tissue Knee, Left ANAEROBIC BACTERIAL CULTURE ROUTINE, FUNGAL OR YEAST CULTURE ROUTINE, AEROBIC BACTERIAL CULTURE ROUTINE Mario Wallace MD 11/10/2021  7:48 AM    C : Left knee Incision #3 Tissue Knee, Left ANAEROBIC BACTERIAL CULTURE ROUTINE, FUNGAL OR YEAST CULTURE ROUTINE, AEROBIC BACTERIAL CULTURE ROUTINE Mario Wallace MD 11/10/2021  7:48 AM      Findings:   Superficial dehiscence of skin and soft tissue, no direct communication with the joint on exam  Complications: None.  Implants: * No implants in log *    Disposition:    WBAT LLE  ABX: Keflex x 10 days  F/U Monday 11/15 for vac change in Dr. Chrissy Whiting

## 2021-11-10 NOTE — OP NOTE
Procedure Date: 11/10/2021    PREOPERATIVE DIAGNOSIS:  Wound dehiscence of left total knee arthroplasty incision.    POSTOPERATIVE DIAGNOSIS:  Wound dehiscence of left total knee arthroplasty incision.    PROCEDURES PERFORMED:    1.  Irrigation and debridement of skin, subcutaneous tissue of left knee incision, 10 cm.    2.  Application of vacuum-assisted closure dressing.    SURGEON:  Mario Wallace M.D.     ASSISTANT:  Dr. Forrest Brown.    BLOOD LOSS:  Minimal.    DESCRIPTION OF PROCEDURE:  The patient was placed on the operating table in the supine position.  After induction of general anesthesia, standard prep and drape of the left lower extremity was performed.  I then examined her wound and the wound was a superficial wound dehiscence where by the skin and subcutaneous tissue were exposed.  There is no evidence of deeper involvement of the knee joint itself.  There is no drainage from the knee itself either.    I then debrided the skin edges back to bleeding tissue as well as the subcutaneous tissue and opened this area thoroughly and cleansed it.    There were several segments of the incision that had opened and each of these were debrided separately.  Three sets of tissue cultures were sent for aerobic, anaerobic and fungal cultures.    Once this was completed, a gentle lavage with saline solution, was undertaken to cleanse the wound.  The wound was then reapproximated using 2-0 nylon far-near-near-far type sutures.     Once this was accomplished, a vacuum-assisted closure dressing was applied using the VIA unit.    A sterile dressing and Ace wrap were applied.    Postoperative plan will be for the patient to keep the unit on it until 1 week and the patient will return early next week for reapplication of the vacuum-assisted closure dressing for a total duration of 2-3 weeks' time.  She will be kept on oral Keflex antibiotic therapy and antibiotics will be changed as directed by culture results in the  future.    Mario Wallace MD        D: 11/10/2021   T: 11/10/2021   MT: RIMMA    Name:     EARLENE GONZALES  MRN:      [id number]        Account:        [id number]   :      1960           Procedure Date: 11/10/2021     Document: [id number]

## 2021-11-10 NOTE — ANESTHESIA CARE TRANSFER NOTE
Patient: Nicci Pemberton    Procedure: Procedure(s):  Irrigation and debridement Left knee skin subcutaneous tissue (length: 10cm), Application of wound vac       Diagnosis: Status post total knee replacement [Z96.659]  Diagnosis Additional Information: No value filed.    Anesthesia Type:   General     Note:      Level of Consciousness: awake and drowsy  Oxygen Supplementation: face mask  Level of Supplemental Oxygen (L/min / FiO2): 4  Independent Airway: airway patency satisfactory and stable  Dentition: dentition unchanged  Vital Signs Stable: post-procedure vital signs reviewed and stable  Report to RN Given: handoff report given  Patient transferred to: PACU    Handoff Report: Identifed the Patient, Identified the Reponsible Provider, Reviewed the pertinent medical history, Discussed the surgical course, Reviewed Intra-OP anesthesia mangement and issues during anesthesia, Set expectations for post-procedure period and Allowed opportunity for questions and acknowledgement of understanding      Vitals:  Vitals Value Taken Time   BP     Temp     Pulse     Resp     SpO2         Electronically Signed By: SANTINO Johnson [facility]  November 10, 2021  8:11 AM

## 2021-11-10 NOTE — ANESTHESIA POSTPROCEDURE EVALUATION
Patient: Nicci Pemberton    Procedure: Procedure(s):  Irrigation and debridement Left knee skin subcutaneous tissue (length: 10cm), Application of wound vac       Diagnosis:Status post total knee replacement [Z96.659]  Diagnosis Additional Information: No value filed.    Anesthesia Type:  General    Note:  Disposition: Outpatient   Postop Pain Control: Uneventful            Sign Out: Well controlled pain   PONV: No   Neuro/Psych: Uneventful            Sign Out: Acceptable/Baseline neuro status   Airway/Respiratory: Uneventful            Sign Out: Acceptable/Baseline resp. status   CV/Hemodynamics: Uneventful            Sign Out: Acceptable CV status; No obvious hypovolemia; No obvious fluid overload   Other NRE: NONE   DID A NON-ROUTINE EVENT OCCUR? No           Last vitals:  Vitals Value Taken Time   /82 11/10/21 0845   Temp 36.3  C (97.4  F) 11/10/21 0845   Pulse 60 11/10/21 0845   Resp 12 11/10/21 0845   SpO2 100 % 11/10/21 0845       Electronically Signed By: Ankur John MD  November 10, 2021  1:54 PM

## 2021-11-11 ENCOUNTER — TELEPHONE (OUTPATIENT)
Dept: ORTHOPEDICS | Facility: CLINIC | Age: 61
End: 2021-11-11
Payer: COMMERCIAL

## 2021-11-11 DIAGNOSIS — L08.9 WOUND INFECTION: Primary | ICD-10-CM

## 2021-11-11 DIAGNOSIS — T14.8XXA WOUND INFECTION: Primary | ICD-10-CM

## 2021-11-11 RX ORDER — CEFPODOXIME PROXETIL 200 MG/1
200 TABLET, FILM COATED ORAL 2 TIMES DAILY
Qty: 20 TABLET | Refills: 1 | Status: SHIPPED | OUTPATIENT
Start: 2021-11-11 | End: 2021-11-21

## 2021-11-11 RX ORDER — LINEZOLID 600 MG/1
600 TABLET, FILM COATED ORAL 2 TIMES DAILY
Qty: 20 TABLET | Refills: 1 | Status: SHIPPED | OUTPATIENT
Start: 2021-11-11 | End: 2021-11-21

## 2021-11-11 NOTE — TELEPHONE ENCOUNTER
Multiple calls between this RN and Nicci.  Nicci needed to change the VIA wound vac cannister last night, she was concerned that is was not clearing the output from the wound. Nicci no longer has a back-up cannister.  The system is working well now. This RN discussed that it is highly unlikely that she will fill a 250ml cannister before Monday when we see her in clinic to change the wound vac system.  This RN contacted the OR upstairs, they do not have extra canisters to provide to the patient.  This RN spoke at length with JESSE De La O regarding the wound vac and then provided direction to Nicci.  Should the machine beep full before Monday, Nicci was instructed to remove the entire system and dressing and cover it with guaze/tape until seen in clinic on Monday.    Nicci verbalized understanding of the plan of care. Will reach out with further questions.    JIMBO LinaresN, RN  RN Care Coordinator, Dr. Rodrigo WYATT [facility] Orthopedic [facility]

## 2021-11-11 NOTE — TELEPHONE ENCOUNTER
I consulted with Dr. Mima Dove after reviewing the culture results from yesterday's debridement notable for presence of Enterococcus faecalis, Citrobacter and strep mitis.      Recommendations for oral antibiotic therapy are for:  1.  Linezolid 600 mg p.o. twice daily x10 days  2.  Vantin 200 mg p.o. twice daily x10 days.    Dr. Dove will monitor subsequent sensitivities and notify me if any change in therapy is warranted.  We will extend the course of therapy if her wound healing remains compromised over the next week or 2.      Spoke to pt and will send RX to local pharmacy    MD Mike Del Rio Family Professor  Oncology and Adult Reconstructive Surgery  Dept Orthopaedic Surgery, [facility]  [phone] office, [phone] pager  [email or web address]

## 2021-11-13 LAB
BACTERIA TISS BX CULT: ABNORMAL
BACTERIA TISS BX CULT: ABNORMAL

## 2021-11-15 ENCOUNTER — MEDICAL CORRESPONDENCE (OUTPATIENT)
Dept: HEALTH INFORMATION MANAGEMENT | Facility: CLINIC | Age: 61
End: 2021-11-15

## 2021-11-15 ENCOUNTER — OFFICE VISIT (OUTPATIENT)
Dept: ORTHOPEDICS | Facility: CLINIC | Age: 61
End: 2021-11-15
Payer: COMMERCIAL

## 2021-11-15 DIAGNOSIS — L08.9 WOUND INFECTION: Primary | ICD-10-CM

## 2021-11-15 DIAGNOSIS — T14.8XXA WOUND INFECTION: Primary | ICD-10-CM

## 2021-11-15 LAB
BACTERIA TISS BX CULT: ABNORMAL

## 2021-11-15 PROCEDURE — 99024 POSTOP FOLLOW-UP VISIT: CPT | Performed by: ORTHOPAEDIC SURGERY

## 2021-11-15 NOTE — PROGRESS NOTES
[facility]  Orthopaedic Surgery, Joint Replacement Consultation  by Mario Wallace M.D.    Nicci Pemberton0 MRN# [id number]   Age: 60 year old YOB: 1960     Requesting physician: Wale Ramon     Diagnosis:   1. Degenerative joint disease bilateral knees  2. Status post liver transplantation     Surgery:   1. 10/23/2020 right total knee replacement  2. 9/24/2021, Left TKA (Rodrigo) [facility]  3. 11/10/2021, L knee incision I and D (Rodrigo) [facility].Enterococcus faecalis, Citrobacter and strep mitis, Srep Oralis. Keflex x 2 days switched to: Linezolid 600 mg p.o. twice daily x10 days, Vantin 200 mg p.o. twice daily x10 days.    Ms. Pemberton returns to see me in regards to her left knee incision.  The VAC wound is changed and a new incisional VAC is applied.  The knee incision is healing satisfactorily without any evidence of active drainage or erythema.  It is yet to completely epithelialized.    New wound VAC dressing applied today.  She will keep this on for another week and then will remove it herself and apply a Tegaderm and Adaptic dressing.  I will see her 2 weeks from now for recheck with suture removal.  She will continue her linezolid and Vantin antibiotic until completion.  I consulted with Dr. Mima Dove after reviewing the culture results last week.    Susceptibility     Citrobacter koseri Enterococcus faecalis Staphylococcus epidermidis Streptococcus oralis     GERALD GERALD GERALD GERALD     Ampicillin  Resistant 1 <=2.0 ug/mL Susceptible   0.12 ug/mL Susceptible     Cefepime <=1.0 ug/mL Susceptible           Cefotaxime       <=0.25 ug/mL Susceptible     Ceftazidime <=1.0 ug/mL Susceptible           Ceftriaxone <=1.0 ug/mL Susceptible     <=0.25 ug/mL Susceptible     Ciprofloxacin <=0.25 ug/mL Susceptible   >=8.0 ug/mL Resistant       Clindamycin     <=0.25 ug/mL Susceptible <=0.06 ug/mL Susceptible     Erythromycin     <=0.25 ug/mL Susceptible >0.5 ug/mL Resistant      Gentamicin <=1.0 ug/mL Susceptible   8.0 ug/mL Intermediate       Gentamicin Synergy   Susceptible... Susceptible 2         Levofloxacin <=0.12 ug/mL Susceptible   >=8.0 ug/mL Resistant       Meropenem <=0.25 ug/mL Susceptible           Oxacillin     >=4.0 ug/mL Resistant       Penicillin   2.0 ug/mL Susceptible   0.12 ug/mL Susceptible     Piperacillin/Tazobactam <=4.0 ug/mL Susceptible           Tetracycline     2.0 ug/mL Susceptible       Tobramycin <=1.0 ug/mL Susceptible           Trimethoprim/Sulfamethoxazole <=1/19 ug/mL Susceptible           Vancomycin   1.0 ug/mL Susceptible 1.0 ug/mL Susceptible 0.5 ug/mL Susceptible        Plan:  Return in 2 weeks for recheck with suture removal.    Mario Wallace MD  [facility] Family Professor  Oncology and Adult Reconstructive Surgery  Dept Orthopaedic Surgery, [facility]  [phone] office, [phone] pager  [email or web address]

## 2021-11-15 NOTE — LETTER
11/15/2021       RE: Nicci Pemberton  [address] [place] [address]    Dear Colleague,    Thank you for referring your patient, Nicci Pemberton, to the [facility] [place]. Please see a copy of my visit note below.        [facility]  Orthopaedic Surgery, Joint Replacement Consultation  by Mario Wallace M.D.    Nicci Pemberton0 MRN# [id number]   Age: 60 year old YOB: 1960     Requesting physician: Wale Ramon     Diagnosis:   1. Degenerative joint disease bilateral knees  2. Status post liver transplantation     Surgery:   1. 10/23/2020 right total knee replacement  2. 9/24/2021, Left TKA (Rodrigo) [facility]  3. 11/10/2021, L knee incision I and D (Rodrigo) [facility].Enterococcus faecalis, Citrobacter and strep mitis, Srep Oralis. Keflex x 2 days switched to: Linezolid 600 mg p.o. twice daily x10 days, Vantin 200 mg p.o. twice daily x10 days.    Ms. Pemberton returns to see me in regards to her left knee incision.  The VAC wound is changed and a new incisional VAC is applied.  The knee incision is healing satisfactorily without any evidence of active drainage or erythema.  It is yet to completely epithelialized.    New wound VAC dressing applied today.  She will keep this on for another week and then will remove it herself and apply a Tegaderm and Adaptic dressing.  I will see her 2 weeks from now for recheck with suture removal.  She will continue her linezolid and Vantin antibiotic until completion.  I consulted with Dr. Mima Dove after reviewing the culture results last week.    Susceptibility     Citrobacter koseri Enterococcus faecalis Staphylococcus epidermidis Streptococcus oralis     GERALD GERALD GERALD GERALD     Ampicillin  Resistant 1 <=2.0 ug/mL Susceptible   0.12 ug/mL Susceptible     Cefepime <=1.0 ug/mL Susceptible           Cefotaxime       <=0.25 ug/mL Susceptible     Ceftazidime <=1.0 ug/mL Susceptible           Ceftriaxone <=1.0 ug/mL  Susceptible     <=0.25 ug/mL Susceptible     Ciprofloxacin <=0.25 ug/mL Susceptible   >=8.0 ug/mL Resistant       Clindamycin     <=0.25 ug/mL Susceptible <=0.06 ug/mL Susceptible     Erythromycin     <=0.25 ug/mL Susceptible >0.5 ug/mL Resistant     Gentamicin <=1.0 ug/mL Susceptible   8.0 ug/mL Intermediate       Gentamicin Synergy   Susceptible... Susceptible 2         Levofloxacin <=0.12 ug/mL Susceptible   >=8.0 ug/mL Resistant       Meropenem <=0.25 ug/mL Susceptible           Oxacillin     >=4.0 ug/mL Resistant       Penicillin   2.0 ug/mL Susceptible   0.12 ug/mL Susceptible     Piperacillin/Tazobactam <=4.0 ug/mL Susceptible           Tetracycline     2.0 ug/mL Susceptible       Tobramycin <=1.0 ug/mL Susceptible           Trimethoprim/Sulfamethoxazole <=1/19 ug/mL Susceptible           Vancomycin   1.0 ug/mL Susceptible 1.0 ug/mL Susceptible 0.5 ug/mL Susceptible      Plan:  Return in 2 weeks for recheck with suture removal.    Mario Wallace MD  [employer] Professor  Oncology and Adult Reconstructive Surgery  Dept Orthopaedic Surgery, [facility]  [phone] office, [phone] pager  [email or web address]

## 2021-11-16 ENCOUNTER — TELEPHONE (OUTPATIENT)
Dept: ORTHOPEDICS | Facility: CLINIC | Age: 61
End: 2021-11-16
Payer: COMMERCIAL

## 2021-11-16 ENCOUNTER — TELEPHONE (OUTPATIENT)
Dept: TRANSPLANT | Facility: CLINIC | Age: 61
End: 2021-11-16
Payer: COMMERCIAL

## 2021-11-16 DIAGNOSIS — M25.512 ACUTE PAIN OF LEFT SHOULDER: Primary | ICD-10-CM

## 2021-11-16 NOTE — TELEPHONE ENCOUNTER
Nicci called this RN, returned call after speaking with PERRY Chilel in clinic.  Nicci informed this RN that she fell last night going into their home and landed on her left shoulder.  Nicci was in the ER in [place], care provided by [facility].  Shoulder was dislocated, shoulder re-located , fracture that needed to be seen/followed up on by Ortho Provider.  Nicci declined care from [facility] in [place], due to her transplant hx.  Nicci called this RN for assistance with seeing a shoulder provider within our care system.  Will get in images from [facility]. Nicci may need to have updated images done in clinic tomorrow. Ranjana facilitated clinic appt here tomorrow at 1330 with Dr. Kline. Nicci confirmed she will be here at the clinic by 1315 tomorrow.  All questions answered. Nicci has arm in sling, immobilizing shoulder. Oxycodone prescription provided in ER for pain.    TOI Linares, RN  RN Care Coordinator, Dr. Rodrigo WYATT [facility]

## 2021-11-16 NOTE — TELEPHONE ENCOUNTER
Spoke with patient. She fell last night. She went to the ER at [facility]. Pt did Dislocated  her shoulder and also has a fracture. Pt has follow up with ortho to determine if needs a pin or not. Message to dr leventhal.

## 2021-11-17 ENCOUNTER — ANCILLARY PROCEDURE (OUTPATIENT)
Dept: GENERAL RADIOLOGY | Facility: CLINIC | Age: 61
End: 2021-11-17
Attending: ORTHOPAEDIC SURGERY
Payer: COMMERCIAL

## 2021-11-17 ENCOUNTER — OFFICE VISIT (OUTPATIENT)
Dept: ORTHOPEDICS | Facility: CLINIC | Age: 61
End: 2021-11-17
Payer: COMMERCIAL

## 2021-11-17 ENCOUNTER — LAB (OUTPATIENT)
Dept: LAB | Facility: CLINIC | Age: 61
End: 2021-11-17
Payer: COMMERCIAL

## 2021-11-17 VITALS — HEIGHT: 62 IN | BODY MASS INDEX: 40.65 KG/M2 | WEIGHT: 220.9 LBS

## 2021-11-17 DIAGNOSIS — S42.252A CLOSED DISPLACED FRACTURE OF GREATER TUBEROSITY OF LEFT HUMERUS, INITIAL ENCOUNTER: Primary | ICD-10-CM

## 2021-11-17 DIAGNOSIS — M25.512 ACUTE PAIN OF LEFT SHOULDER: ICD-10-CM

## 2021-11-17 DIAGNOSIS — Z94.4 LIVER REPLACED BY TRANSPLANT (H): ICD-10-CM

## 2021-11-17 LAB
ALBUMIN SERPL-MCNC: 3.5 G/DL (ref 3.4–5)
ALP SERPL-CCNC: 131 U/L (ref 40–150)
ALT SERPL W P-5'-P-CCNC: 19 U/L (ref 0–50)
ANION GAP SERPL CALCULATED.3IONS-SCNC: 7 MMOL/L (ref 3–14)
AST SERPL W P-5'-P-CCNC: 22 U/L (ref 0–45)
BILIRUB DIRECT SERPL-MCNC: 0.6 MG/DL (ref 0–0.2)
BILIRUB SERPL-MCNC: 2.3 MG/DL (ref 0.2–1.3)
BUN SERPL-MCNC: 32 MG/DL (ref 7–30)
CALCIUM SERPL-MCNC: 9.2 MG/DL (ref 8.5–10.1)
CHLORIDE BLD-SCNC: 104 MMOL/L (ref 94–109)
CO2 SERPL-SCNC: 27 MMOL/L (ref 20–32)
CREAT SERPL-MCNC: 1.25 MG/DL (ref 0.52–1.04)
ERYTHROCYTE [DISTWIDTH] IN BLOOD BY AUTOMATED COUNT: 12.4 % (ref 10–15)
GFR SERPL CREATININE-BSD FRML MDRD: 47 ML/MIN/1.73M2
GLUCOSE BLD-MCNC: 92 MG/DL (ref 70–99)
HCT VFR BLD AUTO: 37.6 % (ref 35–47)
HGB BLD-MCNC: 12.5 G/DL (ref 11.7–15.7)
MCH RBC QN AUTO: 33.1 PG (ref 26.5–33)
MCHC RBC AUTO-ENTMCNC: 33.2 G/DL (ref 31.5–36.5)
MCV RBC AUTO: 100 FL (ref 78–100)
PLATELET # BLD AUTO: 144 10E3/UL (ref 150–450)
POTASSIUM BLD-SCNC: 4.3 MMOL/L (ref 3.4–5.3)
PROT SERPL-MCNC: 7.6 G/DL (ref 6.8–8.8)
RBC # BLD AUTO: 3.78 10E6/UL (ref 3.8–5.2)
SODIUM SERPL-SCNC: 138 MMOL/L (ref 133–144)
WBC # BLD AUTO: 9.6 10E3/UL (ref 4–11)

## 2021-11-17 PROCEDURE — 80053 COMPREHEN METABOLIC PANEL: CPT | Performed by: PATHOLOGY

## 2021-11-17 PROCEDURE — 85027 COMPLETE CBC AUTOMATED: CPT | Performed by: PATHOLOGY

## 2021-11-17 PROCEDURE — 99000 SPECIMEN HANDLING OFFICE-LAB: CPT | Performed by: PATHOLOGY

## 2021-11-17 PROCEDURE — 73030 X-RAY EXAM OF SHOULDER: CPT | Mod: LT | Performed by: RADIOLOGY

## 2021-11-17 PROCEDURE — 82248 BILIRUBIN DIRECT: CPT | Performed by: PATHOLOGY

## 2021-11-17 PROCEDURE — 36415 COLL VENOUS BLD VENIPUNCTURE: CPT | Performed by: PATHOLOGY

## 2021-11-17 PROCEDURE — 99213 OFFICE O/P EST LOW 20 MIN: CPT | Mod: GC | Performed by: ORTHOPAEDIC SURGERY

## 2021-11-17 PROCEDURE — 80158 DRUG ASSAY CYCLOSPORINE: CPT | Mod: 90 | Performed by: PATHOLOGY

## 2021-11-17 ASSESSMENT — MIFFLIN-ST. JEOR: SCORE: 1520.38

## 2021-11-17 NOTE — LETTER
11/17/2021         RE: Nicci Pemberton  [address] [place] [address]        Dear Colleague,    Thank you for referring your patient, Nicci Pemberton, to the [facility] [place]. Please see a copy of my visit note below.    CHIEF CONCERN:  Left humerus dislocation with greater tuberosity fracture (DOI 11/15/21)    HISTORY OF PRESENT ILLNESS:    Nicci Pemberton is a pleasant 60yo LHD female with PMH liver transplant, thyroid disease, anemia, OA s/p L TKA with Dr. Wallace 9/24/21 c/b superficial wound infection, who sustained left shoulder dislocation with greater tuberosity fracture on 11/15/21 after she fell directly on left shoulder while walking up her stairs to enter her house.  She was seen in the ED where sedation was performed, left shoulder dislocation was reduced and she was placed in sling.  She presents to clinic for evaluation of left shoulder.  Since then she has had mild pain around the left shoulder, denies numbness or tingling. Pain has been managed with medications and she has been immobilizing the left shoulder with a shoulder sling.    Past Medical History:   Diagnosis Date     Anemia      Cellulitis      Cirrhosis of liver (H) 06/18/2018     Gastroesophageal reflux disease      Heterozygous alpha 1-antitrypsin deficiency (H)      High hepatic iron concentration determined by biopsy of liver      Liver cirrhosis secondary to ANGULO (H)      Liver transplanted (H) 08/18/2019     Lymphedema      Osteoarthritis     knees     Other chronic pain     Left knee     SBP (spontaneous bacterial peritonitis) (H) 08/02/2019 8/1/19 in [place]     Thrombocytopenia (H) 11/2020     Thyroid disease     Hypothyroid       Past Surgical History:   Procedure Laterality Date     ARTHROPLASTY KNEE Right 10/23/2020    Procedure: Right  total knee arthroplasty;  Surgeon: Mario Wallace MD;  [facility]     ARTHROPLASTY KNEE Left 9/24/2021    Procedure: Left total knee arthroplasty;   Surgeon: Mario Wallace MD;  [facility]     BENCH LIVER N/A 8/18/2019    Procedure: BACKBENCH PREPARATION, LIVER;  Surgeon: Mandeep Alford MD;  [facility]     COLONOSCOPY N/A 6/22/2018    Procedure: COLONOSCOPY;  colonoscopy;  Surgeon: Hugo Patel MD;  [facility]     ENDOSCOPIC RETROGRADE CHOLANGIOPANCREATOGRAM N/A 2/10/2020    Procedure: ENDOSCOPIC RETROGRADE CHOLANGIOPANCREATOGRAPHY with spinchterotomy;  Surgeon: Guru Rigoberto Canada MD;  [facility]     ENDOSCOPIC RETROGRADE CHOLANGIOPANCREATOGRAM N/A 5/13/2020    Procedure: ENDOSCOPIC RETROGRADE CHOLANGIOPANCREATOGRAPHY,Stent exchange and sludge removal;  Surgeon: Guru Rigoberto Canada MD;  [facility]     ENDOSCOPIC RETROGRADE CHOLANGIOPANCREATOGRAM N/A 2/24/2021    Procedure: ENDOSCOPIC RETROGRADE CHOLANGIOPANCREATOGRAPHY, SPINCTEROTOMY, DEBRIDE REMOVAL, STENT PLACEMENT;  Surgeon: Guru Rigoberto Canada MD;  [facility]     ENDOSCOPIC RETROGRADE CHOLANGIOPANCREATOGRAPHY, EXCHANGE TUBE/STENT N/A 3/4/2020    Procedure: ENDOSCOPIC RETROGRADE CHOLANGIOPANCREATOGRAPHY, WITH biliary dilarion, stent exchange, stone removal;  Surgeon: Guru Rigoberto Canada MD;  [facility]     ENDOSCOPIC RETROGRADE CHOLANGIOPANCREATOGRAPHY, EXCHANGE TUBE/STENT N/A 5/12/2021    Procedure: ENDOSCOPIC RETROGRADE CHOLANGIOPANCREATOGRAPHY WITH BILIARY STENT EXCHANGE, DILATION AND SLUDGE/STONE REMOVAL;  Surgeon: Guru Rigoberto Canada MD;  [facility]     ESOPHAGOSCOPY, GASTROSCOPY, DUODENOSCOPY (EGD), COMBINED N/A 10/31/2019    Procedure: Esophagogastroduodenoscopy, With Biopsy;  Surgeon: Nerissa Aranda MD;  [facility]     IR FEEDING TUBE PLACEMENT W MOHAN/MD  9/6/2019     IR FLUORO 0-1 HOUR  9/5/2019     IR LIVER BIOPSY PERCUTANEOUS  3/25/2021     IR LUMBAR PUNCTURE  9/5/2019     IRRIGATION AND DEBRIDEMENT LOWER EXTREMITY, COMBINED Left  11/10/2021    Procedure: Irrigation and debridement Left knee skin subcutaneous tissue (length: 10cm), Application of wound vac;  Surgeon: Mario Wallace MD;  [facility]     KNEE SURGERY  10/23/20     TRANSPLANT LIVER RECIPIENT  DONOR N/A 2019    Procedure: TRANSPLANT, LIVER, RECIPIENT,  DONOR;  Surgeon: Mandeep Alford MD;  [facility]     US PARACENTESIS  2019     US PARACENTESIS WITH ALBUMIN  2019       Current Outpatient Medications   Medication Sig Dispense Refill     acetaminophen (TYLENOL) 325 MG tablet Take 2 tablets (650 mg) by mouth every 4 hours as needed for other 60 tablet 0     cefpodoxime (VANTIN) 200 MG tablet Take 1 tablet (200 mg) by mouth 2 times daily for 10 days 20 tablet 1     Cholecalciferol (VITAMIN D-3) 25 MCG (1000 UT) CAPS Take 1,000 Units by mouth 2 times daily 60 capsule 1     COMPRESSION STOCKINGS 2 each every 12 hours 2 Product 1     cycloSPORINE modified (GENERIC EQUIVALENT) 25 MG capsule Take 3 capsules (75 mg) by mouth every 12 hours 540 capsule 3     famotidine (PEPCID) 20 MG tablet Take 1 tablet (20 mg) by mouth 2 times daily 60 tablet 3     levothyroxine (SYNTHROID/LEVOTHROID) 125 MCG tablet Take 125 mcg by mouth every morning        linezolid (ZYVOX) 600 MG tablet Take 1 tablet (600 mg) by mouth 2 times daily for 10 days 20 tablet 1     mycophenolate (GENERIC EQUIVALENT) 250 MG capsule Take 2 capsules (500 mg) by mouth 2 times daily 360 capsule 3     oxyCODONE (ROXICODONE) 5 MG tablet Take 1-2 tablets (5-10 mg) by mouth every 4 hours as needed for moderate to severe pain 10 tablet 0     cephALEXin (KEFLEX) 500 MG capsule Take 1 capsule (500 mg) by mouth 4 times daily 40 capsule 0     hydrOXYzine (ATARAX) 25 MG tablet Take 1 tablet (25 mg) by mouth every 6 hours as needed for other (adjuvant pain) 30 tablet 0     methocarbamol (ROBAXIN) 500 MG tablet Take 1 tablet (500 mg) by mouth 4 times daily 40 tablet 0     oxyCODONE  (ROXICODONE) 5 MG tablet Take 0.5-1 tablets (2.5-5 mg) by mouth every 6 hours as needed for moderate to severe pain 20 tablet 0     polyethylene glycol (MIRALAX) 17 g packet Take 17 g by mouth daily 7 packet 0          Allergies   Allergen Reactions     Ciprofloxacin Dizziness and Nausea     Food      Fruits with cores and pits  Apples     Sulfa Drugs Other (See Comments)     Swollen joints     Sulfamethoxazole-Trimethoprim      Other reaction(s): Unknown     Furosemide Rash     Other reaction(s): rash  8/14/14       SOCIAL HISTORY:    Social History     Socioeconomic History     Marital status:      Spouse name: Not on file     Number of children: Not on file     Years of education: Not on file     Highest education level: Not on file   Occupational History     Not on file   Tobacco Use     Smoking status: Former Smoker     Packs/day: 0.50     Years: 10.00     Pack years: 5.00     Types: Cigarettes     Start date: 5/6/2000     Quit date: 2011     Years since quitting: 10.1     Smokeless tobacco: Never Used   Substance and Sexual Activity     Alcohol use: No     Comment: due to liver transplant,      Drug use: Never     Sexual activity: Not Currently     Partners: Male     Birth control/protection: Post-menopausal   Other Topics Concern     Parent/sibling w/ CABG, MI or angioplasty before 65F 55M? Not Asked   Social History Narrative     Not on file     Social Determinants of Health     Financial Resource Strain: Not on file   Food Insecurity: Not on file   Transportation Needs: Not on file   Physical Activity: Not on file   Stress: Not on file   Social Connections: Not on file   Intimate Partner Violence: Not on file   Housing Stability: Not on file       FAMILY HISTORY: Reviewed in EMR      REVIEW OF SYSTEMS: Positive for that noted in past medical history and history of present illness and otherwise reviewed in EMR     PHYSICAL EXAM:    General: Adult female in no acute distress. Articulates and  communicates with normal affect.  Respiratory: Respirations even and unlabored  CV: wwp   Skin: Intact. No erythema.   LUE:   Sensation intact all dermatomes into the hand to light touch, axillary nerve sensation intact   Fires deltoid, biceps, triceps, wrist flexors/extensors   EPL, FPL, and Intrinsics intact.     Shoulder symetric to the contralateral side.    Non-TTP of SC joint, clavicle, AC joint   TTP over the anterior shoulder.    IMAGIN2021 left shoulder independently reviewed and compared to prior imaging obtained on date of injury 11/15/2021.  Today's imaging demonstrates minimally displaced left greater tuberosity fracture (depression type).  Humeral head is located within the glenoid.    ASSESSMENT:    1. Moderately displaced, depression type left greater tuberosity fracture       PLAN:  - Continue LUE sling and immobilization for 3 more weeks   - Start PT in 3 weeks - instructions to PT provided   - Follow-up in 2-3 weeks with repeat XR L shoulder      Assessment and plan were discussed with Dr. Kline who is in agreement with above.    Mc Carbone MD  Orthopaedic Surgery Resident  2021     I have personally examined this patient and have reviewed the clinical presentation and progress note with the resident.  I agree with the treatment plan as outlined.  The plan was formulated with the resident on the day of the resident's note.     Sho Kline MD

## 2021-11-17 NOTE — NURSING NOTE
"Reason For Visit:   Chief Complaint   Patient presents with     Consult     Left shoulder dislocaion and fracture.  DOI: 11/15/21       PCP: Wale Thurston  Ref: ED    ?  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 No.  Work status?  Retired.  Date of injury: 11/15/21  Type of injury: Fall.  Date of surgery: NA  Type of surgery: NA.  Smoker: No  Request smoking cessation information: No    Left hand dominant    SANE score  Affected shoulder: Left  Right shoulder SANE: 100  Left shoulder SANE: 0    Ht 1.575 m (5' 2.01\")   Wt 100.2 kg (220 lb 14.4 oz)   BMI 40.39 kg/m        Pain Assessment  Patient Currently in Pain: Yes  0-10 Pain Scale: 6  Primary Pain Location: Shoulder (Left)  Pain Descriptors: Constant,Aching  Alleviating Factors: Rest,Pain medication  Aggravating Factors: Movement    Ranjana Balderas ATC          "

## 2021-11-17 NOTE — PROGRESS NOTES
CHIEF CONCERN:  Left humerus dislocation with greater tuberosity fracture (DOI 11/15/21)    HISTORY OF PRESENT ILLNESS:    Nicci Pemberton is a pleasant 62yo LHD female with PMH liver transplant, thyroid disease, anemia, OA s/p L TKA with Dr. Wallace 9/24/21 c/b superficial wound infection, who sustained left shoulder dislocation with greater tuberosity fracture on 11/15/21 after she fell directly on left shoulder while walking up her stairs to enter her house.  She was seen in the ED where sedation was performed, left shoulder dislocation was reduced and she was placed in sling.  She presents to clinic for evaluation of left shoulder.  Since then she has had mild pain around the left shoulder, denies numbness or tingling. Pain has been managed with medications and she has been immobilizing the left shoulder with a shoulder sling.    Past Medical History:   Diagnosis Date     Anemia      Cellulitis      Cirrhosis of liver (H) 06/18/2018     Gastroesophageal reflux disease      Heterozygous alpha 1-antitrypsin deficiency (H)      High hepatic iron concentration determined by biopsy of liver      Liver cirrhosis secondary to ANGULO (H)      Liver transplanted (H) 08/18/2019     Lymphedema      Osteoarthritis     knees     Other chronic pain     Left knee     SBP (spontaneous bacterial peritonitis) (H) 08/02/2019 8/1/19 in [facility]     Thrombocytopenia (H) 11/2020     Thyroid disease     Hypothyroid       Past Surgical History:   Procedure Laterality Date     ARTHROPLASTY KNEE Right 10/23/2020    Procedure: Right  total knee arthroplasty;  Surgeon: Mario Wallace MD;  [facility]     ARTHROPLASTY KNEE Left 9/24/2021    Procedure: Left total knee arthroplasty;  Surgeon: Mario Wallace MD;  [facility]     BENCH LIVER N/A 8/18/2019    Procedure: BACKBENCH PREPARATION, LIVER;  Surgeon: Mandeep Alford MD;  [facility]     COLONOSCOPY N/A 6/22/2018    Procedure: COLONOSCOPY;  colonoscopy;  Surgeon: yimi Hilton  MD Hugo;  [facility]     ENDOSCOPIC RETROGRADE CHOLANGIOPANCREATOGRAM N/A 2/10/2020    Procedure: ENDOSCOPIC RETROGRADE CHOLANGIOPANCREATOGRAPHY with spinchterotomy;  Surgeon: Guru Rigoberto Canada MD;  [facility]     ENDOSCOPIC RETROGRADE CHOLANGIOPANCREATOGRAM N/A 2020    Procedure: ENDOSCOPIC RETROGRADE CHOLANGIOPANCREATOGRAPHY,Stent exchange and sludge removal;  Surgeon: Guru Rigoberto Canada MD;  [facility]     ENDOSCOPIC RETROGRADE CHOLANGIOPANCREATOGRAM N/A 2021    Procedure: ENDOSCOPIC RETROGRADE CHOLANGIOPANCREATOGRAPHY, SPINCTEROTOMY, DEBRIDE REMOVAL, STENT PLACEMENT;  Surgeon: Guru Rigoberto Canada MD;  [facility]     ENDOSCOPIC RETROGRADE CHOLANGIOPANCREATOGRAPHY, EXCHANGE TUBE/STENT N/A 3/4/2020    Procedure: ENDOSCOPIC RETROGRADE CHOLANGIOPANCREATOGRAPHY, WITH biliary dilarion, stent exchange, stone removal;  Surgeon: Guru Rigoberto Canada MD;  [facility]     ENDOSCOPIC RETROGRADE CHOLANGIOPANCREATOGRAPHY, EXCHANGE TUBE/STENT N/A 2021    Procedure: ENDOSCOPIC RETROGRADE CHOLANGIOPANCREATOGRAPHY WITH BILIARY STENT EXCHANGE, DILATION AND SLUDGE/STONE REMOVAL;  Surgeon: Guru Rigoberto Canada MD;  [facility]     ESOPHAGOSCOPY, GASTROSCOPY, DUODENOSCOPY (EGD), COMBINED N/A 10/31/2019    Procedure: Esophagogastroduodenoscopy, With Biopsy;  Surgeon: Nerissa Aranda MD;  [facility]     IR FEEDING TUBE PLACEMENT W MOHAN/MD  2019     IR FLUORO 0-1 HOUR  2019     IR LIVER BIOPSY PERCUTANEOUS  3/25/2021     IR LUMBAR PUNCTURE  2019     IRRIGATION AND DEBRIDEMENT LOWER EXTREMITY, COMBINED Left 11/10/2021    Procedure: Irrigation and debridement Left knee skin subcutaneous tissue (length: 10cm), Application of wound vac;  Surgeon: Mario Wallace MD;  [facility]     KNEE SURGERY  10/23/20     TRANSPLANT LIVER RECIPIENT  DONOR N/A 2019     Procedure: TRANSPLANT, LIVER, RECIPIENT,  DONOR;  Surgeon: Mandeep Alford MD;  [facility]     US PARACENTESIS  2019     US PARACENTESIS WITH ALBUMIN  2019       Current Outpatient Medications   Medication Sig Dispense Refill     acetaminophen (TYLENOL) 325 MG tablet Take 2 tablets (650 mg) by mouth every 4 hours as needed for other 60 tablet 0     cefpodoxime (VANTIN) 200 MG tablet Take 1 tablet (200 mg) by mouth 2 times daily for 10 days 20 tablet 1     Cholecalciferol (VITAMIN D-3) 25 MCG (1000 UT) CAPS Take 1,000 Units by mouth 2 times daily 60 capsule 1     COMPRESSION STOCKINGS 2 each every 12 hours 2 Product 1     cycloSPORINE modified (GENERIC EQUIVALENT) 25 MG capsule Take 3 capsules (75 mg) by mouth every 12 hours 540 capsule 3     famotidine (PEPCID) 20 MG tablet Take 1 tablet (20 mg) by mouth 2 times daily 60 tablet 3     levothyroxine (SYNTHROID/LEVOTHROID) 125 MCG tablet Take 125 mcg by mouth every morning        linezolid (ZYVOX) 600 MG tablet Take 1 tablet (600 mg) by mouth 2 times daily for 10 days 20 tablet 1     mycophenolate (GENERIC EQUIVALENT) 250 MG capsule Take 2 capsules (500 mg) by mouth 2 times daily 360 capsule 3     oxyCODONE (ROXICODONE) 5 MG tablet Take 1-2 tablets (5-10 mg) by mouth every 4 hours as needed for moderate to severe pain 10 tablet 0     cephALEXin (KEFLEX) 500 MG capsule Take 1 capsule (500 mg) by mouth 4 times daily 40 capsule 0     hydrOXYzine (ATARAX) 25 MG tablet Take 1 tablet (25 mg) by mouth every 6 hours as needed for other (adjuvant pain) 30 tablet 0     methocarbamol (ROBAXIN) 500 MG tablet Take 1 tablet (500 mg) by mouth 4 times daily 40 tablet 0     oxyCODONE (ROXICODONE) 5 MG tablet Take 0.5-1 tablets (2.5-5 mg) by mouth every 6 hours as needed for moderate to severe pain 20 tablet 0     polyethylene glycol (MIRALAX) 17 g packet Take 17 g by mouth daily 7 packet 0          Allergies   Allergen Reactions     Ciprofloxacin Dizziness  and Nausea     Food      Fruits with cores and pits  Apples     Sulfa Drugs Other (See Comments)     Swollen joints     Sulfamethoxazole-Trimethoprim      Other reaction(s): Unknown     Furosemide Rash     Other reaction(s): rash  8/14/14       SOCIAL HISTORY:    Social History     Socioeconomic History     Marital status:      Spouse name: Not on file     Number of children: Not on file     Years of education: Not on file     Highest education level: Not on file   Occupational History     Not on file   Tobacco Use     Smoking status: Former Smoker     Packs/day: 0.50     Years: 10.00     Pack years: 5.00     Types: Cigarettes     Start date: 5/6/2000     Quit date: 2011     Years since quitting: 10.1     Smokeless tobacco: Never Used   Substance and Sexual Activity     Alcohol use: No     Comment: due to liver transplant,      Drug use: Never     Sexual activity: Not Currently     Partners: Male     Birth control/protection: Post-menopausal   Other Topics Concern     Parent/sibling w/ CABG, MI or angioplasty before 65F 55M? Not Asked   Social History Narrative     Not on file     Social Determinants of Health     Financial Resource Strain: Not on file   Food Insecurity: Not on file   Transportation Needs: Not on file   Physical Activity: Not on file   Stress: Not on file   Social Connections: Not on file   Intimate Partner Violence: Not on file   Housing Stability: Not on file       FAMILY HISTORY: Reviewed in EMR      REVIEW OF SYSTEMS: Positive for that noted in past medical history and history of present illness and otherwise reviewed in EMR     PHYSICAL EXAM:    General: Adult female in no acute distress. Articulates and communicates with normal affect.  Respiratory: Respirations even and unlabored  CV: wwp   Skin: Intact. No erythema.   LUE:   Sensation intact all dermatomes into the hand to light touch, axillary nerve sensation intact   Fires deltoid, biceps, triceps, wrist flexors/extensors   EPL,  FPL, and Intrinsics intact.     Shoulder symetric to the contralateral side.    Non-TTP of SC joint, clavicle, AC joint   TTP over the anterior shoulder.    IMAGIN2021 left shoulder independently reviewed and compared to prior imaging obtained on date of injury 11/15/2021.  Today's imaging demonstrates minimally displaced left greater tuberosity fracture (depression type).  Humeral head is located within the glenoid.    ASSESSMENT:    1. Moderately displaced, depression type left greater tuberosity fracture       PLAN:  - Continue LUE sling and immobilization for 3 more weeks   - Start PT in 3 weeks - instructions to PT provided   - Follow-up in 2-3 weeks with repeat XR L shoulder      Assessment and plan were discussed with Dr. Kline who is in agreement with above.    Mc Carbone MD  Orthopaedic Surgery Resident  2021     I have personally examined this patient and have reviewed the clinical presentation and progress note with the resident.  I agree with the treatment plan as outlined.  The plan was formulated with the resident on the day of the resident's note.

## 2021-11-18 LAB
BACTERIA TISS BX CULT: ABNORMAL
CYCLOSPORINE BLD LC/MS/MS-MCNC: 358 UG/L (ref 50–400)
TME LAST DOSE: NORMAL H
TME LAST DOSE: NORMAL H

## 2021-11-19 LAB
BACTERIA TISS BX CULT: ABNORMAL
BACTERIA TISS BX CULT: ABNORMAL

## 2021-11-23 ENCOUNTER — TELEPHONE (OUTPATIENT)
Dept: ORTHOPEDICS | Facility: CLINIC | Age: 61
End: 2021-11-23
Payer: COMMERCIAL

## 2021-11-23 DIAGNOSIS — Z96.659 STATUS POST TOTAL KNEE REPLACEMENT: Primary | ICD-10-CM

## 2021-11-23 RX ORDER — LINEZOLID 600 MG/1
600 TABLET, FILM COATED ORAL 2 TIMES DAILY
Qty: 20 TABLET | Refills: 0 | Status: SHIPPED | OUTPATIENT
Start: 2021-11-23 | End: 2021-12-03

## 2021-11-23 RX ORDER — CEFPODOXIME PROXETIL 200 MG/1
200 TABLET, FILM COATED ORAL 2 TIMES DAILY
Qty: 20 TABLET | Refills: 0 | Status: SHIPPED | OUTPATIENT
Start: 2021-11-23 | End: 2021-12-03

## 2021-11-23 NOTE — PROGRESS NOTES
Returned call to Nicci after speaking with Dr. Wallace.  Dr. Wallace would like her to clean the incision with peroixde and apply guaze dressing/tape to the incision and change the dressings prn. The guaze will absorb any drainage.  Dr. Wallace would like her to continue the Linezolid and Vantin for another 10 days, will send in new scripts to pharmacy.  Discussed with Nicci that she needs to continue to closely monitor her incision and reach out if she has further concerns.    We will see her in clinic on Monday. Instructed Nicci to bring her wound vac and supplies should Dr. Wallace see the needs for it.    Nicci verbalized understanding.    JIMBO LinaresN, RN  RN Care Coordinator, Dr. Wallace  [facility]

## 2021-11-23 NOTE — TELEPHONE ENCOUNTER
Nicci called this RN in tears.  She did remove the wound vac dressing yesterday and applied the dressing as directed by Dr. Wallace.  Today Nicci is noting yelllow serous drainage contained within the dressing. No redness, no fever, no increased pain.  Nicci is very concerned about infection.  She finished her 10 day course of antibiotics.  This RN will reach out to Dr. Wallace and follow-up with Nicci after speaking with him.  Nicci verbalized understanding.    JIMBO LinaresN, RN  RN Care Coordinator, Dr. Rodrigo WYATT [facility]

## 2021-11-24 DIAGNOSIS — S42.252A CLOSED DISPLACED FRACTURE OF GREATER TUBEROSITY OF LEFT HUMERUS, INITIAL ENCOUNTER: Primary | ICD-10-CM

## 2021-11-29 ENCOUNTER — OFFICE VISIT (OUTPATIENT)
Dept: ORTHOPEDICS | Facility: CLINIC | Age: 61
End: 2021-11-29
Payer: COMMERCIAL

## 2021-11-29 VITALS — HEIGHT: 62 IN | WEIGHT: 220 LBS | BODY MASS INDEX: 40.48 KG/M2

## 2021-11-29 DIAGNOSIS — T14.8XXA WOUND INFECTION: Primary | ICD-10-CM

## 2021-11-29 DIAGNOSIS — L08.9 WOUND INFECTION: Primary | ICD-10-CM

## 2021-11-29 PROCEDURE — 99024 POSTOP FOLLOW-UP VISIT: CPT | Mod: GC | Performed by: ORTHOPAEDIC SURGERY

## 2021-11-29 RX ORDER — ASPIRIN 81 MG/1
TABLET ORAL
COMMUNITY
Start: 2021-11-10 | End: 2022-02-24

## 2021-11-29 RX ORDER — NYSTATIN 100000/ML
SUSPENSION, ORAL (FINAL DOSE FORM) ORAL
COMMUNITY
Start: 2021-11-23 | End: 2021-12-07

## 2021-11-29 ASSESSMENT — MIFFLIN-ST. JEOR: SCORE: 1516.16

## 2021-11-29 NOTE — PROGRESS NOTES
[facility]  Orthopaedic Surgery, Joint Replacement Follow up  by Mario Wallace M.D.     Nicci Pemberton MRN# [id number]   Age: 61 year old YOB: 1960     Requesting physician: No ref. provider found  Wale Thurston         Background history:  Diagnosis:   1. Degenerative joint disease bilateral knees  2. Status post liver transplantation     Surgery:   1. 10/23/2020 right total knee replacement  2. 9/24/2021, Left TKA (Rodrigo) [facility]  3. 11/10/2021, L knee incision I and D (Rodrigo) [facility].Enterococcus faecalis, Citrobacter and strep mitis, Strep Oralis, peptoniphilus asaccharolyticus, staphylococcus epidermidis. Keflex x 2 days switched to: Linezolid 600 mg p.o. twice daily x10 days, Vantin 200 mg p.o. twice daily x10 days. Then Vantin 200mg PO BID every day x 10days, linezolid 600mg PO BID daily x 10 days.              Assessment and Plan:   Assessment:  61 year old yo female who is now 2.5 weeks s/p above listed procedure.      Plan:  Finish current antibiotic regimen  ROM as tolerated  WBAT  Monitor incision at home, and f/u earlier if wound is concerning for infection  F/u with Dr. Wallace at 1 year post-op from TKA       Patient seen, evaluated and discussed with Dr. Wallace.     Total combined visit time and work time before and after clinic visit = 20 min     Curly Ring MD  Orthopaedic Surgery Resident, PGY4      Attending MD (Dr. Mario Wallace) Attestation :  This patient was seen and evaluated by me including a history, exam, and interpretation of all imaging and/or lab data.  Either a training physician (resident/fellow), who also saw the patient, or scribe has documented the visit in the attached note.    MD Mike Del Rio Family Professor  Oncology and Adult Reconstructive Surgery  Dept Orthopaedic Surgery, [facility]  [phone] office, [phone] pager  [email or web address]                History of Present Illness:   Patient removed her back at home as asked by  "Dr. Wallace. She has been doing dry dressing changes at home. She is on her second regimen of abs - she has 4 more days left.   She has been ambulating independently at home w/o assistive device, she uses walker intermittently.           Physical Exam:      EXAMINATION pertinent findings:   VITAL SIGNS: Ht 1.575 m (5' 2\")   Wt 99.8 kg (220 lb)   BMI 40.24 kg/m     Body mass index is Body mass index is 40.24 kg/m .  RESP: non labored breathing   ABD: benign   SKIN: grossly normal   LYMPHATIC: grossly normal   NEURO: grossly normal   VASCULAR: satisfactory perfusion of all extremities   MSK:  Gait: normal    L knee:     surgical incision: c/d/i, w/o erythema, slightly warmer than contralateral knee.   Effusion or swelling of the knee: mild swelling present compared to contralateral side.   ROM: 0 to 85, can go upto 90degs  MCL stability: Stable  Lateral Stability: Stable  Anterior drawer: stable  Posterior drawer: stable    SILT sp/dp/tibial/saph/sural nerves.  Motor intact distally TA/GSC/EHL/FHL.    DP/PT pulses palpable, toes warm and well perfused.                 Data:   All laboratory data reviewed  All imaging studies reviewed by me     No New XR today.       "

## 2021-11-29 NOTE — LETTER
11/29/2021         RE: Nicci Pemberton  [address] [place] [address]        Dear Colleague,    Thank you for referring your patient, Nicci Pemberton, to the [facility] [place]. Please see a copy of my visit note below.         [facility]  Orthopaedic Surgery, Joint Replacement Follow up  by Mario Wallace M.D.     Nicci Pemberton MRN# [id number]   Age: 61 year old YOB: 1960     Requesting physician: No ref. provider found  Wale Thurston         Background history:  Diagnosis:   1. Degenerative joint disease bilateral knees  2. Status post liver transplantation     Surgery:   1. 10/23/2020 right total knee replacement  2. 9/24/2021, Left TKA (Rodrigo) [facility]  3. 11/10/2021, L knee incision I and D (Rodrigo) [facility].Enterococcus faecalis, Citrobacter and strep mitis, Strep Oralis, peptoniphilus asaccharolyticus, staphylococcus epidermidis. Keflex x 2 days switched to: Linezolid 600 mg p.o. twice daily x10 days, Vantin 200 mg p.o. twice daily x10 days. Then Vantin 200mg PO BID every day x 10days, linezolid 600mg PO BID daily x 10 days.              Assessment and Plan:   Assessment:  61 year old yo female who is now 2.5 weeks s/p above listed procedure.      Plan:  Finish current antibiotic regimen  ROM as tolerated  WBAT  Monitor incision at home, and f/u earlier if wound is concerning for infection  F/u with Dr. Wallace at 1 year post-op from TKA       Patient seen, evaluated and discussed with Dr. Wallace.     Total combined visit time and work time before and after clinic visit = 20 min     Curly Ring MD  Orthopaedic Surgery Resident, PGY4      Attending MD (Dr. Mario Wallace) Attestation :  This patient was seen and evaluated by me including a history, exam, and interpretation of all imaging and/or lab data.  Either a training physician (resident/fellow), who also saw the patient, or scribe has documented the visit in the attached note.    aMrio Wallace,  "MD Mckeon Family Professor  Oncology and Adult Reconstructive Surgery  Dept Orthopaedic Surgery, [facility]  [phone] office, [phone] pager  [email or web address]                History of Present Illness:   Patient removed her back at home as asked by Dr. Wallace. She has been doing dry dressing changes at home. She is on her second regimen of abs - she has 4 more days left.   She has been ambulating independently at home w/o assistive device, she uses walker intermittently.           Physical Exam:      EXAMINATION pertinent findings:   VITAL SIGNS: Ht 1.575 m (5' 2\")   Wt 99.8 kg (220 lb)   BMI 40.24 kg/m     Body mass index is Body mass index is 40.24 kg/m .  RESP: non labored breathing   ABD: benign   SKIN: grossly normal   LYMPHATIC: grossly normal   NEURO: grossly normal   VASCULAR: satisfactory perfusion of all extremities   MSK:  Gait: normal    L knee:     surgical incision: c/d/i, w/o erythema, slightly warmer than contralateral knee.   Effusion or swelling of the knee: mild swelling present compared to contralateral side.   ROM: 0 to 85, can go upto 90degs  MCL stability: Stable  Lateral Stability: Stable  Anterior drawer: stable  Posterior drawer: stable    SILT sp/dp/tibial/saph/sural nerves.  Motor intact distally TA/GSC/EHL/FHL.    DP/PT pulses palpable, toes warm and well perfused.           Data:   All laboratory data reviewed  All imaging studies reviewed by me     No New XR today.     "

## 2021-11-29 NOTE — NURSING NOTE
"Chief Complaint   Patient presents with     Wound Check     wound check in  clinic        61 year old  1960    Ht 1.575 m (5' 2\")   Wt 99.8 kg (220 lb)   BMI 40.24 kg/m        Date/Surgery/Surgeon/Hospital:  Past Surgical History:   Procedure Laterality Date     ARTHROPLASTY KNEE Right 10/23/2020    Procedure: Right  total knee arthroplasty;  Surgeon: Mario Wallace MD;  Location: [facility] OR     ARTHROPLASTY KNEE Left 9/24/2021    Procedure: Left total knee arthroplasty;  Surgeon: Mario Wallace MD;  Location: [facility] OR    Procedure: ENDOSCOPIC RETROGRADE CHOLANGIOPANCREATOGRAPHY, SPINCTEROTOMY, DEBRIDE REMOVAL, STENT PLACEMENT;  Surgeon: Guru Rigoberto Canada MD;  Location: [facility] OR     IRRIGATION AND DEBRIDEMENT LOWER EXTREMITY, COMBINED Left 11/10/2021    Procedure: Irrigation and debridement Left knee skin subcutaneous tissue (length: 10cm), Application of wound vac;  Surgeon: Mario Wallace MD;  Location: [facility] OR     KNEE SURGERY  10/23/20          Pain Assessment  Patient Currently in Pain: Yes  0-10 Pain Scale: 6  Primary Pain Location: Knee             [id number] - [place], [place] - [address] [facility] [place], [place] - [address] [facility] [address]  [facility] - [place], [place] - [address] [facility] [address] [place] [address] [address] [place], [place] - [address] [facility] [place], [place] - [address] [place] [address]        Allergies   Allergen Reactions     Ciprofloxacin Dizziness and Nausea     Other reaction(s): Dizziness     Furosemide Rash and Swelling     Other reaction(s): rash  8/14/14  Other reaction(s): rash  8/14/14     Food      Fruits with cores and pits  Apples     Sulfa Drugs Other (See Comments)     Swollen joints     Sulfamethoxazole-Trimethoprim      Other reaction(s): Unknown  Other reaction(s): Unknown "           Current Outpatient Medications   Medication     acetaminophen (TYLENOL) 325 MG tablet     aspirin 81 MG EC tablet     cefpodoxime (VANTIN) 200 MG tablet     Cholecalciferol (VITAMIN D-3) 25 MCG (1000 UT) CAPS     COMPRESSION STOCKINGS     cycloSPORINE modified (GENERIC EQUIVALENT) 25 MG capsule     famotidine (PEPCID) 20 MG tablet     levothyroxine (SYNTHROID/LEVOTHROID) 125 MCG tablet     linezolid (ZYVOX) 600 MG tablet     mycophenolate (GENERIC EQUIVALENT) 250 MG capsule     nystatin (MYCOSTATIN) 831108 UNIT/ML suspension     oxyCODONE (ROXICODONE) 5 MG tablet     No current facility-administered medications for this visit.             Questionnaires:      KOOS Knee Survey Assessment    Knee Outcome Survey ADL Scale (NARCISA Trent; ALEJANDRO Vazquez; LUCÍA Pollard; ARLEEN Guerra; ASHLEY Tucker; 1998) 11/1/2021   Do you have swelling in your knee? Sometimes   Do you feel grinding, hear clicking or any other type of noise when your knee moves? Never   Does your knee catch or hang up when moving? Never   Can you straighten your knee fully? Never   Can you bend your knee fully? Often   How severe is your knee joint stiffness after first wakening in the morning? Severe   How severe is your knee stiffness after sitting, lying or resting LATER IN THE DAY? Moderate   How often do you experience knee pain? Daily   Twisting/pivoting on your knee Severe   Straightening knee fully Severe   Bending knee fully Severe   Walking on flat surface Moderate   Going up or down stairs Severe   At night while in bed Extreme   Sitting or lying Moderate   Standing upright Moderate   Descending stairs Severe   Ascending stairs Moderate   Rising from sitting Moderate   Standing Moderate   Bending to floor/ an object Moderate   Walking on flat surface Moderate   Getting in/out of car Moderate   Going shopping Moderate   Putting on socks/stockings Moderate   Rising from bed Severe   Taking off socks/stockings Moderate   Lying in bed  (turning over, maintaining knee position) Moderate   Getting in/out of bath Moderate   Sitting Moderate   Getting on/off toilet Moderate   Heavy domestic duties (moving heavy boxes, scrubbing floors, etc) Severe   Light domestic duties (cooking, dusting, etc) Moderate   Squatting Extreme   Running Extreme   Jumping Extreme   Twisting/pivoting on your injured knee Severe   Kneeling Extreme   How often are you aware of your knee problem? Constantly   Have you modified your life style to avoid potentially damaging activities to your knee? Totally   How much are you troubled with lack of confidence in your knee? Severely   In general, how much difficulty do you have with your knee? Severe   Pain Score 30.55   Symptoms Score 64.28   Function, Daily Living Score 45.58   Sports and Rec Score 5   Quality of Life Score 12.5              Promis 10 Assessment    PROMIS 10 10/24/2021   In general, would you say your health is: Good   In general, would you say your quality of life is: Good   In general, how would you rate your physical health? Fair   In general, how would you rate your mental health, including your mood and your ability to think? Fair   In general, how would you rate your satisfaction with your social activities and relationships? Fair   In general, please rate how well you carry out your usual social activities and roles Fair   To what extent are you able to carry out your everyday physical activities such as walking, climbing stairs, carrying groceries, or moving a chair? A little   How often have you been bothered by emotional problems such as feeling anxious, depressed or irritable? Sometimes   How would you rate your fatigue on average? Moderate   How would you rate your pain on average?   0 = No Pain  to  10 = Worst Imaginable Pain 5   In general, would you say your health is: 3   In general, would you say your quality of life is: 3   In general, how would you rate your physical health? 2   In general, how  would you rate your mental health, including your mood and your ability to think? 2   In general, how would you rate your satisfaction with your social activities and relationships? 2   In general, please rate how well you carry out your usual social activities and roles. (This includes activities at home, at work and in your community, and responsibilities as a parent, child, spouse, employee, friend, etc.) 2   To what extent are you able to carry out your everyday physical activities such as walking, climbing stairs, carrying groceries, or moving a chair? 2   In the past 7 days, how often have you been bothered by emotional problems such as feeling anxious, depressed, or irritable? 3   In the past 7 days, how would you rate your fatigue on average? 3   In the past 7 days, how would you rate your pain on average, where 0 means no pain, and 10 means worst imaginable pain? 5   Global Mental Health Score 10   Global Physical Health Score 10   PROMIS TOTAL - SUBSCORES 20   Some recent data might be hidden

## 2021-11-30 ENCOUNTER — HOSPITAL ENCOUNTER (OUTPATIENT)
Dept: PHYSICAL THERAPY | Facility: CLINIC | Age: 61
Setting detail: THERAPIES SERIES
End: 2021-11-30
Attending: ORTHOPAEDIC SURGERY
Payer: COMMERCIAL

## 2021-11-30 PROCEDURE — 97164 PT RE-EVAL EST PLAN CARE: CPT | Mod: GP | Performed by: PHYSICAL MEDICINE & REHABILITATION

## 2021-11-30 PROCEDURE — 97110 THERAPEUTIC EXERCISES: CPT | Mod: GP | Performed by: PHYSICAL MEDICINE & REHABILITATION

## 2021-11-30 NOTE — PROGRESS NOTES
[facility]    Outpatient Physical Therapy Progress Note- ReEvaluation  Patient: Nicci Pemberton  : 1960    Beginning/End Dates of Reporting Period:  10/5/21 to 21    Referring Provider: Mario Wallace MD    Therapy Diagnosis: L knee pain s/p TKA     Client Self Report: Pt returned to PT today after being away for a few weeks following infection in TKA. Has had multiple wound vacs, and went under again for debridement. Pt also shared she fell and dislocated and broke L shoulder over the weekend. Shared she can no longer do supine exercises as it increases L shoulder pain. Worked on HEP as best as she could, but notes she was not able to do it as much as she would have liked over the last month.    Objective Measurements:  Objective Measure: L knee ROM  Details: flexion (measured on first step): 103*, extension: 0* (measured in seated)  Objective Measure: L knee strength  Details: global 4+/5    Goals:  Goal Identifier 1   Goal Description Pt will be able to stand for 15 minutes without increase in sx in order to decrease difficulty with ADLs.    Target Date 21   Date Met      Progress (detail required for progress note): (still challenging at this time)     Goal Identifier 2   Goal Description Pt will be able to reach 0* L knee extension in order to decrease difficulty with walking without AD.    Target Date 21   Date Met      Progress (detail required for progress note):       Goal Identifier 3   Goal Description Pt will be able to flex L knee 110* in order to decrease difficulty with stairs and home navigation.    Target Date 22   Date Met      Progress (detail required for progress note):       Goal Identifier 4   Goal Description Pt will be independent with HEP in order to self manage symptoms.    Target Date 22   Date Met      Progress (detail required for progress  note):       Goal Identifier 5   Goal Description Pt will be able to perform mini squat and demonstrate 5/5 L knee strength in order to decrease difficulty with ADLs.    Target Date 02/11/22   Date Met      Progress (detail required for progress note): (still challenging at this time)       Progress toward goals: Pt returned to PT after nearly a month away, following an infection and wound debridement (pt also had multiple wound vacs). Pt being seen in PT for L TKA performed on 9/24/21 but has only been able to come to 6 PT sessions prior to today due to comorbidities. Unable to accurately compare progress from last visit to today's visit to due positional restrictions from pt's recent humerus fracture. Pt is still appropriate for continued skilled PT to to further improve ROM and strength deficits, as well as gait, following L TKA, in order to decrease difficulty with home and community amb.     Plan:  Changes to therapy plan of care: date extension. End date now 10 weeks from today. Date extension for remaining goals. Cont PT 2x/week until ROM goals met.    Discharge:  No      Please contact me with any questions or concerns.    Thank you for your referral,     Martita Milligan, PT, DPT  Physical Therapist  [facility]  [address] [place] [address] [phone]

## 2021-12-01 ENCOUNTER — LAB (OUTPATIENT)
Dept: LAB | Facility: CLINIC | Age: 61
End: 2021-12-01
Attending: ORTHOPAEDIC SURGERY
Payer: COMMERCIAL

## 2021-12-01 ENCOUNTER — ANCILLARY PROCEDURE (OUTPATIENT)
Dept: GENERAL RADIOLOGY | Facility: CLINIC | Age: 61
End: 2021-12-01
Attending: ORTHOPAEDIC SURGERY
Payer: COMMERCIAL

## 2021-12-01 ENCOUNTER — OFFICE VISIT (OUTPATIENT)
Dept: ORTHOPEDICS | Facility: CLINIC | Age: 61
End: 2021-12-01
Payer: COMMERCIAL

## 2021-12-01 DIAGNOSIS — G89.29 CHRONIC PAIN OF LEFT KNEE: ICD-10-CM

## 2021-12-01 DIAGNOSIS — S42.252A CLOSED DISPLACED FRACTURE OF GREATER TUBEROSITY OF LEFT HUMERUS, INITIAL ENCOUNTER: ICD-10-CM

## 2021-12-01 DIAGNOSIS — Z94.4 LIVER REPLACED BY TRANSPLANT (H): ICD-10-CM

## 2021-12-01 DIAGNOSIS — M25.562 CHRONIC PAIN OF LEFT KNEE: ICD-10-CM

## 2021-12-01 LAB
ALBUMIN SERPL-MCNC: 3.3 G/DL (ref 3.4–5)
ALP SERPL-CCNC: 128 U/L (ref 40–150)
ALT SERPL W P-5'-P-CCNC: 16 U/L (ref 0–50)
ANION GAP SERPL CALCULATED.3IONS-SCNC: 7 MMOL/L (ref 3–14)
AST SERPL W P-5'-P-CCNC: 17 U/L (ref 0–45)
BILIRUB DIRECT SERPL-MCNC: 0.4 MG/DL (ref 0–0.2)
BILIRUB SERPL-MCNC: 1.4 MG/DL (ref 0.2–1.3)
BUN SERPL-MCNC: 25 MG/DL (ref 7–30)
CALCIUM SERPL-MCNC: 9.2 MG/DL (ref 8.5–10.1)
CHLORIDE BLD-SCNC: 107 MMOL/L (ref 94–109)
CO2 SERPL-SCNC: 25 MMOL/L (ref 20–32)
CREAT SERPL-MCNC: 1.15 MG/DL (ref 0.52–1.04)
CYCLOSPORINE BLD LC/MS/MS-MCNC: 136 UG/L (ref 50–400)
ERYTHROCYTE [DISTWIDTH] IN BLOOD BY AUTOMATED COUNT: 11.7 % (ref 10–15)
GFR SERPL CREATININE-BSD FRML MDRD: 51 ML/MIN/1.73M2
GLUCOSE BLD-MCNC: 91 MG/DL (ref 70–99)
HCT VFR BLD AUTO: 29.7 % (ref 35–47)
HGB BLD-MCNC: 9.9 G/DL (ref 11.7–15.7)
MCH RBC QN AUTO: 32.5 PG (ref 26.5–33)
MCHC RBC AUTO-ENTMCNC: 33.3 G/DL (ref 31.5–36.5)
MCV RBC AUTO: 97 FL (ref 78–100)
PLATELET # BLD AUTO: 102 10E3/UL (ref 150–450)
POTASSIUM BLD-SCNC: 5 MMOL/L (ref 3.4–5.3)
PROT SERPL-MCNC: 6.9 G/DL (ref 6.8–8.8)
RBC # BLD AUTO: 3.05 10E6/UL (ref 3.8–5.2)
SODIUM SERPL-SCNC: 139 MMOL/L (ref 133–144)
TME LAST DOSE: NORMAL H
TME LAST DOSE: NORMAL H
WBC # BLD AUTO: 4 10E3/UL (ref 4–11)

## 2021-12-01 PROCEDURE — 99000 SPECIMEN HANDLING OFFICE-LAB: CPT | Performed by: PATHOLOGY

## 2021-12-01 PROCEDURE — 36415 COLL VENOUS BLD VENIPUNCTURE: CPT | Performed by: PATHOLOGY

## 2021-12-01 PROCEDURE — 85027 COMPLETE CBC AUTOMATED: CPT | Performed by: PATHOLOGY

## 2021-12-01 PROCEDURE — 80053 COMPREHEN METABOLIC PANEL: CPT | Performed by: PATHOLOGY

## 2021-12-01 PROCEDURE — 73030 X-RAY EXAM OF SHOULDER: CPT | Mod: LT | Performed by: RADIOLOGY

## 2021-12-01 PROCEDURE — 80158 DRUG ASSAY CYCLOSPORINE: CPT | Mod: 90 | Performed by: PATHOLOGY

## 2021-12-01 PROCEDURE — 99213 OFFICE O/P EST LOW 20 MIN: CPT | Mod: GC | Performed by: ORTHOPAEDIC SURGERY

## 2021-12-01 PROCEDURE — 82248 BILIRUBIN DIRECT: CPT | Performed by: PATHOLOGY

## 2021-12-01 RX ORDER — OXYCODONE HYDROCHLORIDE 5 MG/1
5 TABLET ORAL
Qty: 10 TABLET | Refills: 0 | Status: SHIPPED | OUTPATIENT
Start: 2021-12-01 | End: 2022-02-24

## 2021-12-01 NOTE — PROGRESS NOTES
CHIEF CONCERN: Left humerus dislocation with greater tuberosity fracture (DOI 11/15/2021)    HISTORY OF PRESENT ILLNESS:    Nicci Pemberton is a pleasant 60yo LHD female with PMH liver transplant, thyroid disease, anemia, OA s/p L TKA with Dr. Wallace 21 c/b superficial wound infection, who sustained left shoulder dislocation with greater tuberosity fracture on 11/15/21.  She returns to clinic today for fracture follow-up.  Since her last visit her pain is improving, she is now managing her pain with 2.5 mg of oxycodone at night only.  She has been wearing her left upper extremity sling at all times.    PHYSICAL EXAM:    General: Adult female in no acute distress. Articulates and communicates with normal affect.  Respiratory: Respirations even and unlabored  CV: wwp   Skin: Bruising over proximal humerus/axilla  LUE:   Sensation intact all dermatomes into the hand to light touch.    EPL, FPL, and Intrinsics intact.     No pain on palpation over the AC joint, clavicle, acromion   TTP over posterior humeral head   Shoulder ROM deferred     IMAGIN2021 XR left shoulder independently reviewed and compared to prior imaging obtained 2021 which demonstrates slight increase in displacement of greater tuberosity fracture.    ASSESSMENT:  1. Moderately displaced, depression type left greater tuberosity fracture    PLAN:  - Refill of oxycodone  - Start PT 21 (already scheduled)  - Follow-up in 4-6 weeks with repeat XR L shoulder    Patient assessment and plan discussed with Dr. Kline.    Mc Carbone MD  Orthopaedic Surgery Resident  2021     [facility]ast

## 2021-12-01 NOTE — NURSING NOTE
Reason For Visit:   Chief Complaint   Patient presents with     RECHECK     2 week follow up Left shoulder dislocaion and fracture.  DOI: 11/15/21.       PCP: Wale Thurston  Ref: ED     ?  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 No.  Work status?  Retired.  Date of injury: 11/15/21  Type of injury: Fall.  Date of surgery: NA  Type of surgery: NA.  Smoker: No  Request smoking cessation information: No     Left hand dominant     SANE score  Affected shoulder: left  Right shoulder SANE: 85  Left shoulder SANE: 50    There were no vitals taken for this visit.      Pain Assessment  Patient Currently in Pain: Yes  0-10 Pain Scale: 4  Primary Pain Location: Shoulder  Alleviating Factors: Rest  Aggravating Factors: Movement    Lela Dennison ATC

## 2021-12-01 NOTE — LETTER
2021         RE: Nicci Pemberton  [address] [address]        Dear Colleague,    Thank you for referring your patient, Ncici Pemberton, to the [facility] [place]. Please see a copy of my visit note below.    CHIEF CONCERN: Left humerus dislocation with greater tuberosity fracture (DOI 11/15/2021)    HISTORY OF PRESENT ILLNESS:    Nicci Pemberton is a pleasant 60yo LHD female with PMH liver transplant, thyroid disease, anemia, OA s/p L TKA with Dr. Wallace 21 c/b superficial wound infection, who sustained left shoulder dislocation with greater tuberosity fracture on 11/15/21.  She returns to clinic today for fracture follow-up.  Since her last visit her pain is improving, she is now managing her pain with 2.5 mg of oxycodone at night only.  She has been wearing her left upper extremity sling at all times.    PHYSICAL EXAM:    General: Adult female in no acute distress. Articulates and communicates with normal affect.  Respiratory: Respirations even and unlabored  CV: wwp   Skin: Bruising over proximal humerus/axilla  LUE:   Sensation intact all dermatomes into the hand to light touch.    EPL, FPL, and Intrinsics intact.     No pain on palpation over the AC joint, clavicle, acromion   TTP over posterior humeral head   Shoulder ROM deferred     IMAGIN2021 XR left shoulder independently reviewed and compared to prior imaging obtained 2021 which demonstrates slight increase in displacement of greater tuberosity fracture.    ASSESSMENT:  1. Moderately displaced, depression type left greater tuberosity fracture    PLAN:  - Refill of oxycodone  - Start PT 21 (already scheduled)  - Follow-up in 4-6 weeks with repeat XR L shoulder    Patient assessment and plan discussed with Dr. Kline.    Mc Carbone MD  Orthopaedic Surgery Resident  2021     .reast

## 2021-12-02 ENCOUNTER — HOSPITAL ENCOUNTER (OUTPATIENT)
Dept: PHYSICAL THERAPY | Facility: CLINIC | Age: 61
Setting detail: THERAPIES SERIES
End: 2021-12-02
Attending: ORTHOPAEDIC SURGERY
Payer: COMMERCIAL

## 2021-12-02 PROCEDURE — 97112 NEUROMUSCULAR REEDUCATION: CPT | Mod: GP | Performed by: PHYSICAL MEDICINE & REHABILITATION

## 2021-12-02 PROCEDURE — 97140 MANUAL THERAPY 1/> REGIONS: CPT | Mod: GP | Performed by: PHYSICAL MEDICINE & REHABILITATION

## 2021-12-02 PROCEDURE — 97110 THERAPEUTIC EXERCISES: CPT | Mod: GP | Performed by: PHYSICAL MEDICINE & REHABILITATION

## 2021-12-07 ENCOUNTER — HOSPITAL ENCOUNTER (OUTPATIENT)
Dept: PHYSICAL THERAPY | Facility: CLINIC | Age: 61
Setting detail: THERAPIES SERIES
End: 2021-12-07
Attending: ORTHOPAEDIC SURGERY
Payer: COMMERCIAL

## 2021-12-07 PROCEDURE — 97110 THERAPEUTIC EXERCISES: CPT | Mod: GP | Performed by: PHYSICAL MEDICINE & REHABILITATION

## 2021-12-07 PROCEDURE — 97161 PT EVAL LOW COMPLEX 20 MIN: CPT | Mod: GP | Performed by: PHYSICAL MEDICINE & REHABILITATION

## 2021-12-07 NOTE — PROGRESS NOTES
12/07/21 1000   General Information   Type of Visit Initial OP Ortho PT Evaluation   Start of Care Date 12/07/21   Referring Physician Sho Kline MD   Patient/Family Goals Statement improve reaching, bathing, dressing, lifting and carrying   Orders Evaluate and Treat   Date of Order 11/17/21   Certification Required? No   Medical Diagnosis Closed displaced fracture of greater tuberosity of left humerus, initial encounter S42.252A  - Primary    Surgical/Medical history reviewed Yes   Precautions/Limitations no known precautions/limitations   General Information Comments PMHx per pt report: liver transplant, smoking (quit 10/3/11)   Body Part(s)   Body Part(s) Shoulder   Presentation and Etiology   Pertinent history of current problem (include personal factors and/or comorbidities that impact the POC) Pt arrived to PT today for L shoulder pain that started after fall while going up steps at home. Pt currently being seen in PT for L TKA. Pt notes she fell and dislocated L shoulder and fractured humerus.    Impairments A. Pain;D. Decreased ROM;E. Decreased flexibility;F. Decreased strength and endurance   Functional Limitations perform activities of daily living;perform desired leisure / sports activities   Symptom Location L shoulder   How/Where did it occur With a fall   Onset date of current episode/exacerbation 11/15/21   Chronicity New   Pain rating (0-10 point scale) Best (/10);Worst (/10)   Best (/10) 3   Worst (/10) 8   Pain quality A. Sharp;B. Dull;C. Aching;F. Stabbing   Frequency of pain/symptoms B. Intermittent   Pain/symptoms are: Worse during the day   Pain/symptoms exacerbated by C. Lifting;D. Carrying;E. Rest;G. Certain positions;H. Overhead reach;J. ADL;K. Home tasks   Pain/symptoms eased by   (unable to specify)   Progression of symptoms since onset: Improved   Prior Level of Function   Prior Level of Function-Mobility independent, with occasional use of SPC/walker from TKA   Prior  Level of Function-ADLs independent   Current Level of Function   Current Community Support Family/friend caregiver   Patient role/employment history F. Retired   Living environment House/townhome   Home/community accessibility no stairs within home. 3 TRISHA with railing on R with ascending   Current equipment-Gait/Locomotion None   Fall Risk Screen   Fall screen completed by PT   Have you fallen 2 or more times in the past year? No   Have you fallen and had an injury in the past year? Yes   Is patient a fall risk? Yes;Department fall risk interventions implemented   Fall screen comments Pt has had 1 fall in the last year and is the cause of current injury. Pt also being seeing in PT for L TKA at this time. Therefore, considering fall risk, current injuries, and gait mechanics, PT considers pt fall risk.    Abuse Screen (yes response referral indicated)   Feels Unsafe at Home or Work/School no   Feels Threatened by Someone no   Does Anyone Try to Keep You From Having Contact with Others or Doing Things Outside Your Home? no   Physical Signs of Abuse Present no   Functional Scales   Functional Scales Other   Other Scales  SPADI: 78.89% disability   Shoulder Objective Findings   Side (if bilateral, select both right and left) Left   Observation pt arrived with L shoulder in sling   Integumentary  pt notes residual bruising   Posture rounded shoulders, forward head   Cervical Screen (ROM, quadrant) WFL   Thoracic Mobility Screen WFL   Shoulder ROM Comment pain at end range AROM/PROM   Scapulothoracic Rhythm fair with scap set   Pec Minor (supine) Flexibility limited B   Shoulder Flexibility Comments strength/MMT NT per pain and pain with AROM   Shoulder Special Tests Comments special tests NT per pain and MD diagnosis   Palpation noted no increase in sx with min-mod palpation of L shoulder soft tissues and bony prominances   Accessory Motion/Joint Mobility unable to accurately assess due to limited mobility and pain    Left Shoulder Flexion AROM 65*   Left Shoulder Flexion PROM in seated: 80*   Left Shoulder Abduction AROM 53*   Left Shoulder Abduction PROM in seated: 60*   Left Shoulder ER AROM to L ear   Left Shoulder IR AROM T10/11   Planned Therapy Interventions   Planned Therapy Interventions ADL retraining;balance training;bed mobility training;joint mobilization;manual therapy;motor coordination training;neuromuscular re-education;ROM;strengthening;stretching;transfer training   Planned Modality Interventions   Planned Modality Interventions Cryotherapy;Electrical stimulation;Hot packs;TENS;Ultrasound   Planned Modality Interventions Comments only as needed   Clinical Impression   Criteria for Skilled Therapeutic Interventions Met yes, treatment indicated   PT Diagnosis L shoulder pain secondary to dislocation and fracture   Influenced by the following impairments decreased ROM, decreased strength, impaired posture, fall risk, pain   Functional limitations due to impairments reaching, lifting, carrying   Clinical Presentation Stable/Uncomplicated   Clinical Presentation Rationale PLOF, SPADI, comorbidities, clinical judgement   Clinical Decision Making (Complexity) Low complexity   Therapy Frequency 1 time/week   Predicted Duration of Therapy Intervention (days/wks) 10 weeks   Risk & Benefits of therapy have been explained Yes   Patient, Family & other staff in agreement with plan of care Yes   Clinical Impression Comments Pt is a 61 y.o. female who presented to the PT clinic today with a rehab diagnosis of L shoulder pain secondary to dislocation and fracture as evidenced by decreased ROM, decreased strength, impaired posture, fall risk, and pain. Pt is appropriate for skilled PT to address previously listed impairments in order to decrease difficulty with reaching, lifting and carrying.    Education Assessment   Preferred Learning Style Listening;Reading;Demonstration;Pictures/video   Barriers to Learning No barriers    ORTHO GOALS   PT Ortho Eval Goals 1;2;3;4;5   Ortho Goal 1   Goal Identifier 1   Goal Description Pt able to flex L shoulder 120* in order to decrease difficulty with reaching overhead.    Target Date 12/28/21   Ortho Goal 2   Goal Identifier 2   Goal Description Pt will be able to abd L shoulder 120* in order to decrease difficulty with reaching overhead   Target Date 01/11/22   Ortho Goal 3   Goal Identifier 3   Goal Description Pt will be able to demonstrate 5-/5 L shoulder strength in order to decrease difficulty with lifting and carrying.   Target Date 02/18/22   Ortho Goal 4   Goal Identifier 4   Goal Description Pt will be independent with HEP in order to self manage symptoms.    Target Date 02/18/22   Ortho Goal 5   Goal Identifier 5   Goal Description Pt will improve SPADI by 20%, indicating decreased difficulty with ADLs.    Target Date 02/18/22   Total Evaluation Time   PT Wilman, Low Complexity Minutes ([id number]) 15       Please contact me with any questions or concerns.    Thank you for your referral,     Martita Milligan, PT, DPT  Physical Therapist  [address] [address] [phone]

## 2021-12-08 LAB
BACTERIA TISS BX CULT: NO GROWTH

## 2021-12-14 ENCOUNTER — HOSPITAL ENCOUNTER (OUTPATIENT)
Dept: PHYSICAL THERAPY | Facility: CLINIC | Age: 61
Setting detail: THERAPIES SERIES
End: 2021-12-14
Attending: ORTHOPAEDIC SURGERY
Payer: COMMERCIAL

## 2021-12-14 ENCOUNTER — LAB (OUTPATIENT)
Dept: LAB | Facility: CLINIC | Age: 61
End: 2021-12-14
Payer: COMMERCIAL

## 2021-12-14 DIAGNOSIS — Z94.4 LIVER REPLACED BY TRANSPLANT (H): ICD-10-CM

## 2021-12-14 LAB
ALBUMIN SERPL-MCNC: 3.5 G/DL (ref 3.4–5)
ALP SERPL-CCNC: 145 U/L (ref 40–150)
ALT SERPL W P-5'-P-CCNC: 20 U/L (ref 0–50)
ANION GAP SERPL CALCULATED.3IONS-SCNC: 6 MMOL/L (ref 3–14)
AST SERPL W P-5'-P-CCNC: 24 U/L (ref 0–45)
BILIRUB DIRECT SERPL-MCNC: 0.3 MG/DL (ref 0–0.2)
BILIRUB SERPL-MCNC: 1.3 MG/DL (ref 0.2–1.3)
BUN SERPL-MCNC: 31 MG/DL (ref 7–30)
CALCIUM SERPL-MCNC: 8.3 MG/DL (ref 8.5–10.1)
CHLORIDE BLD-SCNC: 108 MMOL/L (ref 94–109)
CO2 SERPL-SCNC: 25 MMOL/L (ref 20–32)
CREAT SERPL-MCNC: 1.04 MG/DL (ref 0.52–1.04)
CYCLOSPORINE BLD LC/MS/MS-MCNC: 110 UG/L (ref 50–400)
ERYTHROCYTE [DISTWIDTH] IN BLOOD BY AUTOMATED COUNT: 13.1 % (ref 10–15)
GFR SERPL CREATININE-BSD FRML MDRD: 58 ML/MIN/1.73M2
GLUCOSE BLD-MCNC: 151 MG/DL (ref 70–99)
HCT VFR BLD AUTO: 31.8 % (ref 35–47)
HGB BLD-MCNC: 10.6 G/DL (ref 11.7–15.7)
MCH RBC QN AUTO: 32.8 PG (ref 26.5–33)
MCHC RBC AUTO-ENTMCNC: 33.3 G/DL (ref 31.5–36.5)
MCV RBC AUTO: 99 FL (ref 78–100)
PLATELET # BLD AUTO: 201 10E3/UL (ref 150–450)
POTASSIUM BLD-SCNC: 4.2 MMOL/L (ref 3.4–5.3)
PROT SERPL-MCNC: 7.5 G/DL (ref 6.8–8.8)
RBC # BLD AUTO: 3.23 10E6/UL (ref 3.8–5.2)
SODIUM SERPL-SCNC: 139 MMOL/L (ref 133–144)
TME LAST DOSE: NORMAL H
TME LAST DOSE: NORMAL H
WBC # BLD AUTO: 5 10E3/UL (ref 4–11)

## 2021-12-14 PROCEDURE — 36415 COLL VENOUS BLD VENIPUNCTURE: CPT

## 2021-12-14 PROCEDURE — 85027 COMPLETE CBC AUTOMATED: CPT

## 2021-12-14 PROCEDURE — 97112 NEUROMUSCULAR REEDUCATION: CPT | Mod: GP | Performed by: PHYSICAL MEDICINE & REHABILITATION

## 2021-12-14 PROCEDURE — 97110 THERAPEUTIC EXERCISES: CPT | Mod: GP | Performed by: PHYSICAL MEDICINE & REHABILITATION

## 2021-12-14 PROCEDURE — 80158 DRUG ASSAY CYCLOSPORINE: CPT

## 2021-12-14 PROCEDURE — 82248 BILIRUBIN DIRECT: CPT

## 2021-12-14 PROCEDURE — 80053 COMPREHEN METABOLIC PANEL: CPT

## 2021-12-21 ENCOUNTER — HOSPITAL ENCOUNTER (OUTPATIENT)
Dept: PHYSICAL THERAPY | Facility: CLINIC | Age: 61
Setting detail: THERAPIES SERIES
End: 2021-12-21
Attending: ORTHOPAEDIC SURGERY
Payer: COMMERCIAL

## 2021-12-21 PROCEDURE — 97110 THERAPEUTIC EXERCISES: CPT | Mod: GP | Performed by: PHYSICAL MEDICINE & REHABILITATION

## 2021-12-23 ENCOUNTER — HOSPITAL ENCOUNTER (OUTPATIENT)
Dept: PHYSICAL THERAPY | Facility: CLINIC | Age: 61
Setting detail: THERAPIES SERIES
End: 2021-12-23
Attending: ORTHOPAEDIC SURGERY
Payer: COMMERCIAL

## 2021-12-23 PROCEDURE — 97110 THERAPEUTIC EXERCISES: CPT | Mod: GP | Performed by: PHYSICAL MEDICINE & REHABILITATION

## 2022-01-01 NOTE — ADDENDUM NOTE
Addended by: RAGINI COBB on: 9/21/2020 09:48 AM     Modules accepted: Orders     [FreeTextEntry1] : 9 day old ex 33 wk M born breech with no PMH presenting for weight check. He is gaining 10g/day. He has just surpassed birthweight but still quite small. Developing appropriately. Educated mom on reflux precautions. She will work with lactation today. Will follow up in 1 week for weight check. As he was breech gave parents prescription for Hip US to be done at 44 wks corrected.

## 2022-01-04 ENCOUNTER — HOSPITAL ENCOUNTER (OUTPATIENT)
Dept: PHYSICAL THERAPY | Facility: CLINIC | Age: 62
Setting detail: THERAPIES SERIES
End: 2022-01-04
Attending: ORTHOPAEDIC SURGERY
Payer: COMMERCIAL

## 2022-01-04 PROCEDURE — 97110 THERAPEUTIC EXERCISES: CPT | Mod: GP | Performed by: PHYSICAL MEDICINE & REHABILITATION

## 2022-01-06 ENCOUNTER — TELEPHONE (OUTPATIENT)
Dept: LAB | Facility: CLINIC | Age: 62
End: 2022-01-06
Payer: COMMERCIAL

## 2022-01-06 DIAGNOSIS — Z94.4 LIVER REPLACED BY TRANSPLANT (H): ICD-10-CM

## 2022-01-06 DIAGNOSIS — Z13.220 LIPID SCREENING: ICD-10-CM

## 2022-01-06 NOTE — PROGRESS NOTES
Your patient has a lab appointment on 1/13/22 for labs post transplant. At this time there are no orders placed for there lab appointment. Please review and sign orders prior to there appointment time. Thank you.    [facility]    Ivory Alarcon CMA (Lab)

## 2022-01-10 DIAGNOSIS — S42.252A CLOSED DISPLACED FRACTURE OF GREATER TUBEROSITY OF LEFT HUMERUS, INITIAL ENCOUNTER: Primary | ICD-10-CM

## 2022-01-11 ENCOUNTER — HOSPITAL ENCOUNTER (OUTPATIENT)
Dept: PHYSICAL THERAPY | Facility: CLINIC | Age: 62
Setting detail: THERAPIES SERIES
End: 2022-01-11
Attending: ORTHOPAEDIC SURGERY
Payer: COMMERCIAL

## 2022-01-11 PROCEDURE — 97110 THERAPEUTIC EXERCISES: CPT | Mod: GP | Performed by: PHYSICAL MEDICINE & REHABILITATION

## 2022-01-11 PROCEDURE — 97140 MANUAL THERAPY 1/> REGIONS: CPT | Mod: GP | Performed by: PHYSICAL MEDICINE & REHABILITATION

## 2022-01-12 ENCOUNTER — ANCILLARY PROCEDURE (OUTPATIENT)
Dept: GENERAL RADIOLOGY | Facility: CLINIC | Age: 62
End: 2022-01-12
Attending: ORTHOPAEDIC SURGERY
Payer: COMMERCIAL

## 2022-01-12 ENCOUNTER — OFFICE VISIT (OUTPATIENT)
Dept: ORTHOPEDICS | Facility: CLINIC | Age: 62
End: 2022-01-12
Payer: COMMERCIAL

## 2022-01-12 DIAGNOSIS — S42.252A CLOSED DISPLACED FRACTURE OF GREATER TUBEROSITY OF LEFT HUMERUS, INITIAL ENCOUNTER: ICD-10-CM

## 2022-01-12 DIAGNOSIS — S42.252D CLOSED DISPLACED FRACTURE OF GREATER TUBEROSITY OF LEFT HUMERUS WITH ROUTINE HEALING, SUBSEQUENT ENCOUNTER: Primary | ICD-10-CM

## 2022-01-12 PROCEDURE — 73030 X-RAY EXAM OF SHOULDER: CPT | Mod: LT | Performed by: RADIOLOGY

## 2022-01-12 PROCEDURE — 99213 OFFICE O/P EST LOW 20 MIN: CPT | Performed by: ORTHOPAEDIC SURGERY

## 2022-01-12 NOTE — PROGRESS NOTES
CHIEF CONCERN: Left humerus dislocation with greater tuberosity fracture (DOI 11/15/2021)    HISTORY OF PRESENT ILLNESS:    Nicci Pemberton is a pleasant 62yo LHD female who sustained left shoulder dislocation with greater tuberosity fracture on 11/15/21. She reports some symptoms of clicking with activity but her shoulder is overall improving.     PHYSICAL EXAM:    General: Adult female in no acute distress. Articulates and communicates with normal affect.  Respiratory: Respirations even and unlabored  LUE: Active FE to 120, abduction to 80 and ER at side to 50.    IMAGIN2021 XR left shoulder demonstrates the impacted greater tuberosity is unchanged in position and there is callous formation evident at the periphery.    ASSESSMENT:  1. Moderately displaced, depression type left greater tuberosity fracture    PLAN:  We discussed next steps in treatment. Patient reiterated that her goal is to not have surgery and have less pain.  We will continue PT.  She will follow up in 6 weeks with video visit

## 2022-01-12 NOTE — LETTER
2022         RE: Nicci Pemberton  [address] [place] [address]        Dear Colleague,    Thank you for referring your patient, Nicci Pemberton, to the [facility] [place]. Please see a copy of my visit note below.    CHIEF CONCERN: Left humerus dislocation with greater tuberosity fracture (DOI 11/15/2021)    HISTORY OF PRESENT ILLNESS:    Nicci Pemberton is a pleasant 62yo LHD female who sustained left shoulder dislocation with greater tuberosity fracture on 11/15/21. She reports some symptoms of clicking with activity but her shoulder is overall improving.     PHYSICAL EXAM:    General: Adult female in no acute distress. Articulates and communicates with normal affect.  Respiratory: Respirations even and unlabored  LUE: Active FE to 120, abduction to 80 and ER at side to 50.    IMAGIN2021 XR left shoulder demonstrates the impacted greater tuberosity is unchanged in position and there is callous formation evident at the periphery.    ASSESSMENT:  1. Moderately displaced, depression type left greater tuberosity fracture    PLAN:  We discussed next steps in treatment. Patient reiterated that her goal is to not have surgery and have less pain.  We will continue PT.  She will follow up in 6 weeks with video visit      Sho Kline MD

## 2022-01-12 NOTE — NURSING NOTE
Reason For Visit:   Chief Complaint   Patient presents with     RECHECK     8 week follow up Left shoulder dislocaion and fracture.  DOI: 11/15/21.      PCP: Wale Thurston  Ref: ED     ?  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 No.  Work status?  Retired.  Date of injury: 11/15/21  Type of injury: Fall.  Date of surgery: NA  Type of surgery: NA.  Smoker: No  Request smoking cessation information: No     Left hand dominant    SANE score  Affected shoulder: Left  Right shoulder SANE: 100  Left shoulder SANE: 70    There were no vitals taken for this visit.      Pain Assessment  Patient Currently in Pain: Denies (a little pain with usage and pain at night)    Ranjana Balderas ATC

## 2022-01-12 NOTE — PLAN OF CARE
/71 (BP Location: Right arm)   Pulse 94   Temp 98.5  F (36.9  C) (Oral)   Resp 20   Wt 95.5 kg (210 lb 8.6 oz)   SpO2 97%   BMI 38.51 kg/m   VS stable on 2L O2. Patient reports pain - headache, managed with meds. Patient does not report nausea. Urine Output - incontinent of urine at times, uses bedpan and continent at times. Bowel Function - passing gas. Nutrition - tube feeding, poor appetite. Drains - OBED removed by provider today. Activity - up to chair with lift and repositioning in bed every 2 hours.      12-Jan-2022 01:24

## 2022-01-13 ENCOUNTER — HOSPITAL ENCOUNTER (OUTPATIENT)
Dept: PHYSICAL THERAPY | Facility: CLINIC | Age: 62
Setting detail: THERAPIES SERIES
End: 2022-01-13
Attending: ORTHOPAEDIC SURGERY
Payer: COMMERCIAL

## 2022-01-13 ENCOUNTER — LAB (OUTPATIENT)
Dept: LAB | Facility: CLINIC | Age: 62
End: 2022-01-13
Payer: COMMERCIAL

## 2022-01-13 DIAGNOSIS — Z94.4 LIVER REPLACED BY TRANSPLANT (H): ICD-10-CM

## 2022-01-13 DIAGNOSIS — Z13.220 LIPID SCREENING: ICD-10-CM

## 2022-01-13 LAB
ALBUMIN SERPL-MCNC: 3.6 G/DL (ref 3.4–5)
ALBUMIN UR-MCNC: NEGATIVE MG/DL
ALP SERPL-CCNC: 134 U/L (ref 40–150)
ALT SERPL W P-5'-P-CCNC: 19 U/L (ref 0–50)
ANION GAP SERPL CALCULATED.3IONS-SCNC: 6 MMOL/L (ref 3–14)
APPEARANCE UR: CLEAR
AST SERPL W P-5'-P-CCNC: 24 U/L (ref 0–45)
BILIRUB DIRECT SERPL-MCNC: 0.3 MG/DL (ref 0–0.2)
BILIRUB SERPL-MCNC: 1.2 MG/DL (ref 0.2–1.3)
BILIRUB UR QL STRIP: NEGATIVE
BUN SERPL-MCNC: 26 MG/DL (ref 7–30)
CALCIUM SERPL-MCNC: 9.4 MG/DL (ref 8.5–10.1)
CHLORIDE BLD-SCNC: 105 MMOL/L (ref 94–109)
CHOLEST SERPL-MCNC: 204 MG/DL
CO2 SERPL-SCNC: 25 MMOL/L (ref 20–32)
COLOR UR AUTO: YELLOW
CREAT SERPL-MCNC: 1.06 MG/DL (ref 0.52–1.04)
CREAT UR-MCNC: 62 MG/DL
CYCLOSPORINE BLD LC/MS/MS-MCNC: 132 UG/L (ref 50–400)
ERYTHROCYTE [DISTWIDTH] IN BLOOD BY AUTOMATED COUNT: 12.8 % (ref 10–15)
FASTING STATUS PATIENT QL REPORTED: ABNORMAL
GFR SERPL CREATININE-BSD FRML MDRD: 59 ML/MIN/1.73M2
GLUCOSE BLD-MCNC: 88 MG/DL (ref 70–99)
GLUCOSE UR STRIP-MCNC: NEGATIVE MG/DL
HCT VFR BLD AUTO: 37.7 % (ref 35–47)
HDLC SERPL-MCNC: 45 MG/DL
HGB BLD-MCNC: 12.5 G/DL (ref 11.7–15.7)
HGB UR QL STRIP: NEGATIVE
KETONES UR STRIP-MCNC: NEGATIVE MG/DL
LDLC SERPL CALC-MCNC: 137 MG/DL
LEUKOCYTE ESTERASE UR QL STRIP: NEGATIVE
MCH RBC QN AUTO: 32.6 PG (ref 26.5–33)
MCHC RBC AUTO-ENTMCNC: 33.2 G/DL (ref 31.5–36.5)
MCV RBC AUTO: 98 FL (ref 78–100)
NITRATE UR QL: NEGATIVE
NONHDLC SERPL-MCNC: 159 MG/DL
PH UR STRIP: 5.5 [PH] (ref 5–7)
PLATELET # BLD AUTO: 175 10E3/UL (ref 150–450)
POTASSIUM BLD-SCNC: 4.6 MMOL/L (ref 3.4–5.3)
PROT SERPL-MCNC: 7.7 G/DL (ref 6.8–8.8)
PROT UR-MCNC: 0.07 G/L
PROT/CREAT 24H UR: 0.11 G/G CR (ref 0–0.2)
RBC # BLD AUTO: 3.84 10E6/UL (ref 3.8–5.2)
SODIUM SERPL-SCNC: 136 MMOL/L (ref 133–144)
SP GR UR STRIP: 1.02 (ref 1–1.03)
TME LAST DOSE: NORMAL H
TME LAST DOSE: NORMAL H
TRIGL SERPL-MCNC: 109 MG/DL
UROBILINOGEN UR STRIP-ACNC: 0.2 E.U./DL
WBC # BLD AUTO: 5.1 10E3/UL (ref 4–11)

## 2022-01-13 PROCEDURE — 80053 COMPREHEN METABOLIC PANEL: CPT

## 2022-01-13 PROCEDURE — 97140 MANUAL THERAPY 1/> REGIONS: CPT | Mod: GP | Performed by: PHYSICAL MEDICINE & REHABILITATION

## 2022-01-13 PROCEDURE — 85027 COMPLETE CBC AUTOMATED: CPT

## 2022-01-13 PROCEDURE — 36415 COLL VENOUS BLD VENIPUNCTURE: CPT

## 2022-01-13 PROCEDURE — 82248 BILIRUBIN DIRECT: CPT

## 2022-01-13 PROCEDURE — 80061 LIPID PANEL: CPT

## 2022-01-13 PROCEDURE — 84156 ASSAY OF PROTEIN URINE: CPT

## 2022-01-13 PROCEDURE — 81003 URINALYSIS AUTO W/O SCOPE: CPT

## 2022-01-13 PROCEDURE — 97110 THERAPEUTIC EXERCISES: CPT | Mod: GP | Performed by: PHYSICAL MEDICINE & REHABILITATION

## 2022-01-13 PROCEDURE — 80158 DRUG ASSAY CYCLOSPORINE: CPT

## 2022-01-14 ENCOUNTER — MEDICAL CORRESPONDENCE (OUTPATIENT)
Dept: HEALTH INFORMATION MANAGEMENT | Facility: CLINIC | Age: 62
End: 2022-01-14
Payer: COMMERCIAL

## 2022-01-18 ENCOUNTER — HOSPITAL ENCOUNTER (OUTPATIENT)
Dept: PHYSICAL THERAPY | Facility: CLINIC | Age: 62
Setting detail: THERAPIES SERIES
End: 2022-01-18
Attending: ORTHOPAEDIC SURGERY
Payer: COMMERCIAL

## 2022-01-18 DIAGNOSIS — Z94.4 LIVER REPLACED BY TRANSPLANT (H): ICD-10-CM

## 2022-01-18 DIAGNOSIS — R79.89 ELEVATED LFTS: Primary | ICD-10-CM

## 2022-01-18 PROCEDURE — 97110 THERAPEUTIC EXERCISES: CPT | Mod: GP | Performed by: PHYSICAL MEDICINE & REHABILITATION

## 2022-01-18 NOTE — PROGRESS NOTES
[facility]    Outpatient Physical Therapy Progress Note  Patient: Nicci Pemberton  : 1960    Beginning/End Dates of Reporting Period:  10/5/21 to 22    Referring Provider: Mario Wallace MD    Therapy Diagnosis: L knee pain s/p TKA     Client Self Report: Notes after a shower over the weekend she felt more limber and was able to push further through exercises. States she feels knee is getting better every day.     Objective Measurements:  Objective Measure: L knee ROM  Details: extension: 0*.  flexion: 98* with overpressure  Objective Measure: L knee strength  Details: 4+/5 globally    Goals:  Goal Identifier 1   Goal Description Pt will be able to stand for 15 minutes without increase in sx in order to decrease difficulty with ADLs.    Target Date 21   Date Met  22   Progress (detail required for progress note): (notes she can now do this easily)     Goal Identifier 2   Goal Description Pt will be able to reach 0* L knee extension in order to decrease difficulty with walking without AD.    Target Date 21   Date Met  22   Progress (detail required for progress note):       Goal Identifier 3   Goal Description Pt will be able to flex L knee 110* in order to decrease difficulty with stairs and home navigation.    Target Date 02/15/22   Date Met      Progress (detail required for progress note): (98* with overpressure)     Goal Identifier 4   Goal Description Pt will be independent with HEP in order to self manage symptoms.    Target Date 22   Date Met      Progress (detail required for progress note): (LTG, mod I with current HEP, requires encouragement)     Goal Identifier 5   Goal Description Pt will be able to perform mini squat and demonstrate 5/5 L knee strength in order to decrease difficulty with ADLs.    Target Date 03/15/22   Date Met      Progress (detail  required for progress note): (still challenging at this time)     Progress toward goals: Pt has attended 16 PT sessions, achieving 2/5 short term and long term goals. Pt's progress has been slow secondary to fall and L humerus fracture, as well as infection with surgical debridment of L knee. Pt is continuing to make slow and steady progress and would benefit from further skilled PT to return to full ROM necessary for transfers, as well as full strength for ADLs.     Plan:  Continue therapy per current plan of care, 2x/week.  Changes to goals: date extension for remaining goals.    Discharge:  No      Please contact me with any questions or concerns.    Thank you for your referral,     Martita Milligan, PT, DPT  Physical Therapist  [address] [address] [phone]

## 2022-01-20 ENCOUNTER — HOSPITAL ENCOUNTER (OUTPATIENT)
Dept: PHYSICAL THERAPY | Facility: CLINIC | Age: 62
Setting detail: THERAPIES SERIES
End: 2022-01-20
Attending: ORTHOPAEDIC SURGERY
Payer: COMMERCIAL

## 2022-01-20 PROCEDURE — 97110 THERAPEUTIC EXERCISES: CPT | Mod: GP | Performed by: PHYSICAL MEDICINE & REHABILITATION

## 2022-01-25 ENCOUNTER — HOSPITAL ENCOUNTER (OUTPATIENT)
Dept: PHYSICAL THERAPY | Facility: CLINIC | Age: 62
Setting detail: THERAPIES SERIES
End: 2022-01-25
Attending: ORTHOPAEDIC SURGERY
Payer: COMMERCIAL

## 2022-01-25 PROCEDURE — 97110 THERAPEUTIC EXERCISES: CPT | Mod: GP | Performed by: PHYSICAL MEDICINE & REHABILITATION

## 2022-01-26 DIAGNOSIS — Z94.4 LIVER REPLACED BY TRANSPLANT (H): Primary | ICD-10-CM

## 2022-01-27 ENCOUNTER — HOSPITAL ENCOUNTER (OUTPATIENT)
Dept: PHYSICAL THERAPY | Facility: CLINIC | Age: 62
Setting detail: THERAPIES SERIES
End: 2022-01-27
Attending: ORTHOPAEDIC SURGERY
Payer: COMMERCIAL

## 2022-01-27 PROCEDURE — 97110 THERAPEUTIC EXERCISES: CPT | Mod: GP | Performed by: PHYSICAL MEDICINE & REHABILITATION

## 2022-02-01 ENCOUNTER — IMMUNIZATION (OUTPATIENT)
Dept: FAMILY MEDICINE | Facility: CLINIC | Age: 62
End: 2022-02-01

## 2022-02-01 ENCOUNTER — HOSPITAL ENCOUNTER (OUTPATIENT)
Dept: PHYSICAL THERAPY | Facility: CLINIC | Age: 62
Setting detail: THERAPIES SERIES
End: 2022-02-01
Attending: ORTHOPAEDIC SURGERY
Payer: COMMERCIAL

## 2022-02-01 DIAGNOSIS — Z23 NEED FOR PROPHYLACTIC VACCINATION AND INOCULATION AGAINST INFLUENZA: Primary | ICD-10-CM

## 2022-02-01 PROCEDURE — 90682 RIV4 VACC RECOMBINANT DNA IM: CPT

## 2022-02-01 PROCEDURE — 90471 IMMUNIZATION ADMIN: CPT

## 2022-02-01 PROCEDURE — 97110 THERAPEUTIC EXERCISES: CPT | Mod: GP | Performed by: PHYSICAL MEDICINE & REHABILITATION

## 2022-02-01 PROCEDURE — 99207 PR NO CHARGE NURSE ONLY: CPT

## 2022-02-07 ENCOUNTER — TELEPHONE (OUTPATIENT)
Dept: TRANSPLANT | Facility: CLINIC | Age: 62
End: 2022-02-07
Payer: COMMERCIAL

## 2022-02-07 NOTE — TELEPHONE ENCOUNTER
Spoke with patient. She is to get CBC every 2 weeks after COVID booster. Pt is set up for tuesday 15th.  CBC weekly incase any changes to CBC.

## 2022-02-07 NOTE — TELEPHONE ENCOUNTER
Patient Call: General  Route to LPN    Reason for call: Patient called to check and see what the frequency for her labs should be. She shows online that it says weekly but still has an order that says every 2 months     Call back needed? Yes  Return Call Needed  Same as documented in contacts section  When to return call?: Same day: Route High Priority

## 2022-02-08 ENCOUNTER — HOSPITAL ENCOUNTER (OUTPATIENT)
Dept: PHYSICAL THERAPY | Facility: CLINIC | Age: 62
Setting detail: THERAPIES SERIES
End: 2022-02-08
Attending: ORTHOPAEDIC SURGERY
Payer: COMMERCIAL

## 2022-02-08 PROCEDURE — 97110 THERAPEUTIC EXERCISES: CPT | Mod: GP | Performed by: PHYSICAL MEDICINE & REHABILITATION

## 2022-02-08 NOTE — PROGRESS NOTES
[facility]    Outpatient Physical Therapy Progress Note  Patient: Nicci Pemberton  : 1960    Beginning/End Dates of Reporting Period:  21 to 22    Referring Provider: Urszula Kline MD    Therapy Diagnosis: L shoulder pain secondary to dislocation and fracture     Client Self Report: Pt reports shoulder is doing better, states she can now wash hair without difficulty. Also noting other ADLs have become easier    Objective Measurements:  Objective Measure: L shoulder AROM  Details: flexion: 161*, abd: 170*, ER: T3/4, IR: T7/8      Outcome Measures (most recent score):  SPADI: 13.85 % disability (78.89% at initial eval)    Goals:  Goal Identifier 1   Goal Description Pt able to flex L shoulder 120* in order to decrease difficulty with reaching overhead.    Target Date 21   Date Met  22   Progress (detail required for progress note):       Goal Identifier 2   Goal Description Pt will be able to abd L shoulder 120* in order to decrease difficulty with reaching overhead   Target Date 22   Date Met  22   Progress (detail required for progress note):       Goal Identifier 3   Goal Description Pt will be able to demonstrate 5-/5 L shoulder strength in order to decrease difficulty with lifting and carrying.   Target Date 22   Date Met      Progress (detail required for progress note): (currently focusing on ROM and light strengthening at this time)     Goal Identifier 4   Goal Description Pt will be independent with HEP in order to self manage symptoms.    Target Date 22   Date Met  22   Progress (detail required for progress note):       Goal Identifier 5   Goal Description Pt will improve SPADI by 20%, indicating decreased difficulty with ADLs.    Target Date 22   Date Met  22   Progress (detail required for progress note):         Progress  toward goals: Pt has attended 9 PT sessions, and has achieved 4/5 short term and long term goals. Pt now demonstrates near full AROM of L shoulder (matching R) without pain. Pt improved SPADI by 65% since initial eval. Pt is appropriate for continued skilled PT in order to increase L shoulder strength, to reach final goal regarding lifting and carrying.     Plan:  Changes to therapy plan of care: continue 1x/week for additional 5 weeks    Discharge:  No    Please contact me with any questions or concerns.    Thank you for your referral,     Martita Milligan, PT, DPT  Physical Therapist  [address] [address] [phone]

## 2022-02-15 ENCOUNTER — HOSPITAL ENCOUNTER (OUTPATIENT)
Dept: PHYSICAL THERAPY | Facility: CLINIC | Age: 62
Setting detail: THERAPIES SERIES
End: 2022-02-15
Attending: ORTHOPAEDIC SURGERY
Payer: COMMERCIAL

## 2022-02-15 PROCEDURE — 97110 THERAPEUTIC EXERCISES: CPT | Mod: GP | Performed by: PHYSICAL MEDICINE & REHABILITATION

## 2022-02-15 NOTE — PROGRESS NOTES
[facility]    Outpatient Physical Therapy Discharge Note  Patient: Nicci Pemberton  : 1960    Beginning/End Dates of Reporting Period:  10/5/21 to 2/15/22    Referring Provider: Mario Wallace MD    Therapy Diagnosis: L knee pain s/p TKA     Client Self Report: Pt reports L knee still coming along but feels ready to cont HEP independently at home.    Objective Measurements:  Objective Measure: L knee ROM  Details: 0-114* (with overpressure)  Objective Measure: L knee strength  Details: flexion: 5/5, extension: 5/5     Outcome Measures (most recent score):  LEFS: 53/80 (4/80 at initial eval)    Goals:  Goal Identifier 1   Goal Description Pt will be able to stand for 15 minutes without increase in sx in order to decrease difficulty with ADLs.    Target Date 21   Date Met  22   Progress (detail required for progress note):       Goal Identifier 2   Goal Description Pt will be able to reach 0* L knee extension in order to decrease difficulty with walking without AD.    Target Date 21   Date Met  22   Progress (detail required for progress note):       Goal Identifier 3   Goal Description Pt will be able to flex L knee 110* in order to decrease difficulty with stairs and home navigation.    Target Date 02/15/22   Date Met  22   Progress (detail required for progress note):       Goal Identifier 4   Goal Description Pt will be independent with HEP in order to self manage symptoms.    Target Date 22   Date Met  22   Progress (detail required for progress note):       Goal Identifier 5   Goal Description Pt will be able to perform mini squat and demonstrate 5/5 L knee strength in order to decrease difficulty with ADLs.    Target Date 03/15/22   Date Met  02/15/22   Progress (detail required for progress note): (able to demonstrate mini squat during session  independently, able to demonstrate 5/5 flexion and extension)     Progress toward goals: Pt has attended 22 PT sessions, achieving 5/5 short term and long term goals. Pt's ROM, strength, pain, and gait have improved and pt has returned to PLOF. Pt is appropriate for D/C at this time as she has met all goals and is independent with HEP.     Plan:  Discharge from therapy.    Discharge:    Reason for Discharge: Patient has met all goals.    Equipment Issued: [employer]    Discharge Plan: Patient to continue home program.      Please contact me with any questions or concerns.    Thank you for your referral,     Martita Milligan, PT, DPT  Physical Therapist  [address] [address] [phone]

## 2022-02-23 ENCOUNTER — LAB (OUTPATIENT)
Dept: LAB | Facility: CLINIC | Age: 62
End: 2022-02-23
Payer: COMMERCIAL

## 2022-02-23 ENCOUNTER — VIRTUAL VISIT (OUTPATIENT)
Dept: ORTHOPEDICS | Facility: CLINIC | Age: 62
End: 2022-02-23
Payer: COMMERCIAL

## 2022-02-23 DIAGNOSIS — S42.252D CLOSED DISPLACED FRACTURE OF GREATER TUBEROSITY OF LEFT HUMERUS WITH ROUTINE HEALING, SUBSEQUENT ENCOUNTER: Primary | ICD-10-CM

## 2022-02-23 DIAGNOSIS — Z94.4 LIVER REPLACED BY TRANSPLANT (H): ICD-10-CM

## 2022-02-23 LAB
ERYTHROCYTE [DISTWIDTH] IN BLOOD BY AUTOMATED COUNT: 12.3 % (ref 10–15)
HCT VFR BLD AUTO: 40.5 % (ref 35–47)
HGB BLD-MCNC: 13.2 G/DL (ref 11.7–15.7)
MCH RBC QN AUTO: 32.8 PG (ref 26.5–33)
MCHC RBC AUTO-ENTMCNC: 32.6 G/DL (ref 31.5–36.5)
MCV RBC AUTO: 101 FL (ref 78–100)
PLATELET # BLD AUTO: 171 10E3/UL (ref 150–450)
RBC # BLD AUTO: 4.03 10E6/UL (ref 3.8–5.2)
WBC # BLD AUTO: 7.3 10E3/UL (ref 4–11)

## 2022-02-23 PROCEDURE — 99213 OFFICE O/P EST LOW 20 MIN: CPT | Mod: GT | Performed by: ORTHOPAEDIC SURGERY

## 2022-02-23 PROCEDURE — 36415 COLL VENOUS BLD VENIPUNCTURE: CPT

## 2022-02-23 PROCEDURE — 85027 COMPLETE CBC AUTOMATED: CPT

## 2022-02-23 NOTE — PROGRESS NOTES
Nicci is a 61 year old who is being evaluated via a billable video visit.      How would you like to obtain your AVS? Well      Video Start Time: 1:32pm    CHIEF CONCERN: Left humerus dislocation with greater tuberosity fracture (DOI 11/15/2021)    HISTORY OF PRESENT ILLNESS:    Nicci Pemberton is a pleasant 60yo LHD female who sustained left shoulder dislocation with greater tuberosity fracture on 11/15/21. She has been doing PT and still does have some clicking symptoms. Perceives some glenohumeral motion.    PHYSICAL EXAM:    General: Adult female in no acute distress. Articulates and communicates with normal affect.  Respiratory: Respirations even and unlabored  LUE: Active FE to 165, abduction to 100 and ER at side to 70.    ASSESSMENT:  1. Moderately displaced, depression type left greater tuberosity fracture    PLAN:  Xray if symptoms worsen or patient has concerns.  Continue PT to focus on strengthening.  Return prn.      Video-Visit Details    Type of service:  Video Visit    Video End Time:1:47 PM    Originating Location (pt. Location): Home    Distant Location (provider location):  [facility]     Platform used for Video Visit: [employer]

## 2022-02-23 NOTE — LETTER
2/23/2022         RE: Nicci Pemberton  [address] [place] [address]        Dear Colleague,    Thank you for referring your patient, Nicci Pemberton, to the [facility]. Please see a copy of my visit note below.    Nicci is a 61 year old who is being evaluated via a billable video visit.      How would you like to obtain your AVS? MyChart      Video Start Time: 1:32pm    CHIEF CONCERN: Left humerus dislocation with greater tuberosity fracture (DOI 11/15/2021)    HISTORY OF PRESENT ILLNESS:    Nicci Pemberton is a pleasant 60yo LHD female who sustained left shoulder dislocation with greater tuberosity fracture on 11/15/21. She has been doing PT and still does have some clicking symptoms. Perceives some glenohumeral motion.    PHYSICAL EXAM:    General: Adult female in no acute distress. Articulates and communicates with normal affect.  Respiratory: Respirations even and unlabored  LUE: Active FE to 165, abduction to 100 and ER at side to 70.    ASSESSMENT:  1. Moderately displaced, depression type left greater tuberosity fracture    PLAN:  Xray if symptoms worsen or patient has concerns.  Continue PT to focus on strengthening.  Return prn.      Video-Visit Details    Type of service:  Video Visit    Video End Time:1:47 PM    Originating Location (pt. Location): Home    Distant Location (provider location):  [facility]     Platform used for Video Visit: Giorgi Kline MD

## 2022-02-23 NOTE — NURSING NOTE
Reason For Visit:   Chief Complaint   Patient presents with     RECHECK     3-4 month follow up Left shoulder dislocaion and fracture.  DOI: 11/15/21       PCP: Wale Thurston  Ref: ED     ?  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 No.  Work status?  Retired.  Date of injury: 11/15/21  Type of injury: Fall.  Date of surgery: NA  Type of surgery: NA.  Smoker: No  Request smoking cessation information: No     Left hand dominant    SANE score  Affected shoulder: Left  Right shoulder SANE: 100  Left shoulder SANE: 85-90    There were no vitals taken for this visit.      Pain Assessment  Patient Currently in Pain: Ottoniel Balderas, ATC

## 2022-02-24 ENCOUNTER — OFFICE VISIT (OUTPATIENT)
Dept: GASTROENTEROLOGY | Facility: CLINIC | Age: 62
End: 2022-02-24
Attending: INTERNAL MEDICINE
Payer: COMMERCIAL

## 2022-02-24 VITALS
DIASTOLIC BLOOD PRESSURE: 87 MMHG | WEIGHT: 227.8 LBS | HEART RATE: 75 BPM | SYSTOLIC BLOOD PRESSURE: 125 MMHG | BODY MASS INDEX: 41.67 KG/M2 | OXYGEN SATURATION: 99 % | TEMPERATURE: 98.1 F

## 2022-02-24 DIAGNOSIS — N18.30 STAGE 3 CHRONIC KIDNEY DISEASE, UNSPECIFIED WHETHER STAGE 3A OR 3B CKD (H): ICD-10-CM

## 2022-02-24 DIAGNOSIS — D84.9 IMMUNOSUPPRESSED STATUS (H): ICD-10-CM

## 2022-02-24 DIAGNOSIS — Z94.4 LIVER REPLACED BY TRANSPLANT (H): ICD-10-CM

## 2022-02-24 DIAGNOSIS — D69.3 IDIOPATHIC THROMBOCYTOPENIC PURPURA (H): ICD-10-CM

## 2022-02-24 DIAGNOSIS — E66.01 MORBID OBESITY (H): ICD-10-CM

## 2022-02-24 DIAGNOSIS — Z48.298 CARE AFTER ORGAN TRANSPLANT: ICD-10-CM

## 2022-02-24 DIAGNOSIS — T86.41 LIVER TRANSPLANT REJECTION (H): Primary | ICD-10-CM

## 2022-02-24 DIAGNOSIS — Z86.2: ICD-10-CM

## 2022-02-24 PROCEDURE — G0463 HOSPITAL OUTPT CLINIC VISIT: HCPCS

## 2022-02-24 PROCEDURE — 99215 OFFICE O/P EST HI 40 MIN: CPT | Performed by: INTERNAL MEDICINE

## 2022-02-24 ASSESSMENT — PAIN SCALES - GENERAL: PAINLEVEL: NO PAIN (0)

## 2022-02-24 NOTE — NURSING NOTE
Chief Complaint   Patient presents with     RECHECK       /87   Pulse 75   Temp 98.1  F (36.7  C) (Oral)   Wt 103.3 kg (227 lb 12.8 oz)   SpO2 99%   BMI 41.67 kg/m      Humberto Braxton MA on 2/24/2022 at 11:34 AM

## 2022-02-24 NOTE — LETTER
2022     RE: Nicci Pemberton  [address] [place] [address]    Dear Colleague,    Thank you for referring your patient, Nicci Pemberton, to the [facility] [place]. Please see a copy of my visit note below.    Date of Encounter: 2022     HPI   Nicci Pemberton is a 61 year old female with past medical history of obesity, lymphedema, and cirrhosis secondary to combination of nonalcoholic fatty liver disease, iron overload, and alpha-1 antitrypsin MZ phenotype who is s/p  donor liver transplant on 19 and presents in follow up.    After resolution of her ITP, she did have abnormal liver tests in early spring 2021, underwent ERCP which led to removal of stents that was in place and sludge and stones were also removed.  Anastomotic stricture was resolved and she did not require ongoing stenting.  This fall she had an episode where she slipped and fell, injuring her shoulder, ultimately requiring surgical intervention.  She tolerated that intervention without difficulty.    She notes that her energy level is okay, and she does have concerns that she gained weight over this past winter.  She plans been very physically active this spring and summer and seems quite motivated.  She reports that she got her booster shot of the motor no vaccine on February 15, 2022.  2 days after the vaccine she notes that she did experience dizziness for the next 5 to 7 days, but on today's clinic visit appears resolved.  We did discuss the role of a second booster, or fourth shot.      Surgical Course  - OLT date: 2019   - etiology: ANGULO/alpha 1  - donor: type DBD  - Induction IS: basiliximab  - CMV status D+/R+  - operation: Surgical technique caval replacement, biliary anastomosis: duct to duct  - CiT 335min, WiT 35min  - Episodes of Rejection:  Biopsy 2021 with five of nine moderate ACR  - Biliary complications:  Severe anastomotic stricture noted 2020,  repeat stenting March 2020.  Last ERCP May 2021 with removal of stent and resolution of stricture  - Other complications of immediate post-operative course: delerium with d/c of tacrolimus and start of cyclosporin, ITP    Medical history, surgical history, social history, family history, and medications all reviewed and updated    Review of systems was otherwise negative more specifically commented upon in the HPI    Physical Exam  - vitals reviewed  Gen: NAD  HEENT: anicteric  CV: RRR  Lungs: Normal respiratory excursion   Abd: + BS  Ext: warm, no edema  Neuro: A&Ox3  Skin: no jaundice    Labs: Reviewed  Procedures: Reviewed    Assessment and Plan:     S/P Liver Transplantation:   - 8/18/2019 with DBD donor for ANGULO/A1AT/iron overload  - Bi-caval with duct to duct  - Excellent allograft function at this time  -Postoperative course complicated by anastomotic biliary stricture now treated with stents episode of  -Moderate ACR (5/9) in March 2021 treated with baseline increase in immunosuppression  -We will continue with labs per protocol     Immunosuppression:   - Was transitioned to cyclosporin from tacrolimus for concerns of delerium in the immediate post transplant period.  -Continues on cyclosporine with goal trough dose to ; has been on low-dose MMF given moderate rejection in March 2021  -We will wait until she has some clinical stability, then ideally I would like to rechallenge her with tacrolimus, as this could then be used as a single agent immunosuppressant moving forward  - Will plan to check immunosuppression trough levels per protocol to assess for adequate target dosing and will assess CBC and kidney function for toxicity of medications    Kidney Health:   - Intermittent acute kidney injury.  This is felt likely secondary to calcineurin inhibitor exposure as well as multiple medical comorbidities over the past year  -Has stage II/III CKD    Covid  -Tested positive historically, relatively  asymptomatic  -Has now received first two doses of Moderna vaccine, then received booster on February 15, 2022.  I did discuss that if her labs today within normal limits after this vaccination that she should strongly consider a second booster, her fourth overall shot in approximately 5 to 6 months.    History of ITP:   - Unclear if this was precipitated by Covid vaccine, knee surgery, or anti-infectives.  Resolved  -We are following her platelets closely in the setting of recent Covid vaccine booster     History of diverticulitis:  -She is already seen colorectal surgery in consultation at our request.  Noted watchful waiting at this time  -No overt indication for repeat colonoscopy or antibiotics at this time    Nutrition/Weight:    It is very common for patients to put on significant weight after liver transplantation, as they become conditioned to eating as much as possible to maintain a healthy weight pre-transplant.  There is a very significant risk of developing fatty liver and non-alcoholic steatohepatitis in post-transplant patients, even in those who were not transplanted for ANGULO cirrhosis.  - Please work on eating a diet that is rice in fresh fruits and vegetables, moderate amounts of lean protein and dairy, and modest amounts of carbohydrates and fats.  - An exercise plan/regimen is an essential part of remaining healthy in the post-transplant setting and we recommend 30-35 minutes of exercise at least 4 days a week     Routine Health Care:  - You need to establish and maintain care with a primary care physician to manage: hypertension, high cholesterol, abnormal blood sugars - as these are all potential complications of your health after liver transplantation and will need a local physician to manage these issues.  - All patients with liver diseaes (even post transplant) are at an increased risk for osteoporosis.  We strongly recommend screening for Vitamin D deficiency at least twice yearly with  aggressive supplementation/replacement as indicated.    - We also recommend a screening DEXA scan to evaluate for osteoporosis.  If present, should treat with calcium, Vitamin D supplementation, and recommend consideration of bisphosphonate therapy.  Also recommend follow up DEXA scans to evaluate for improvement of bone density on therapy.  - Recommend yearly skin exams with dermatology, and if skin cancers are found, recommend twice yearly exams  - Recommend you stay up to date for cancer screening: mammograms/PAP for women and prostate cancer screening for men, and colon cancer screening for all.  - Practice good hygiene with washing of hands and maintaining a clean living space as this will decrease your chances of developing an infection in the post-transplantation setting  - Eat a health diet: rich in fresh fruits and vegetables, lean proteins, and minimize carbohydrate and fat intake.    Follow up: 6 months    Thomas M. Leventhal, M.D.   of Medicine  Advanced & Transplant Hepatology  [facility]

## 2022-02-24 NOTE — PROGRESS NOTES
Date of Encounter: 2022     HPI   Nicci Pemberton is a 61 year old female with past medical history of obesity, lymphedema, and cirrhosis secondary to combination of nonalcoholic fatty liver disease, iron overload, and alpha-1 antitrypsin MZ phenotype who is s/p  donor liver transplant on 19 and presents in follow up.    After resolution of her ITP, she did have abnormal liver tests in early spring 2021, underwent ERCP which led to removal of stents that was in place and sludge and stones were also removed.  Anastomotic stricture was resolved and she did not require ongoing stenting.  This fall she had an episode where she slipped and fell, injuring her shoulder, ultimately requiring surgical intervention.  She tolerated that intervention without difficulty.    She notes that her energy level is okay, and she does have concerns that she gained weight over this past winter.  She plans been very physically active this spring and summer and seems quite motivated.  She reports that she got her booster shot of the motor no vaccine on February 15, 2022.  2 days after the vaccine she notes that she did experience dizziness for the next 5 to 7 days, but on today's clinic visit appears resolved.  We did discuss the role of a second booster, or fourth shot.      Surgical Course  - OLT date: 2019   - etiology: ANGULO/alpha 1  - donor: type DBD  - Induction IS: basiliximab  - CMV status D+/R+  - operation: Surgical technique caval replacement, biliary anastomosis: duct to duct  - CiT 335min, WiT 35min  - Episodes of Rejection:  Biopsy 2021 with five of nine moderate ACR  - Biliary complications:  Severe anastomotic stricture noted 2020, repeat stenting 2020.  Last ERCP May 2021 with removal of stent and resolution of stricture  - Other complications of immediate post-operative course: delerium with d/c of tacrolimus and start of cyclosporin, ITP    Medical history, surgical  history, social history, family history, and medications all reviewed and updated    Review of systems was otherwise negative more specifically commented upon in the HPI    Physical Exam  - vitals reviewed  Gen: NAD  HEENT: anicteric  CV: RRR  Lungs: Normal respiratory excursion   Abd: + BS  Ext: warm, no edema  Neuro: A&Ox3  Skin: no jaundice    Labs: Reviewed  Procedures: Reviewed    Assessment and Plan:     S/P Liver Transplantation:   - 8/18/2019 with DBD donor for ANGULO/A1AT/iron overload  - Bi-caval with duct to duct  - Excellent allograft function at this time  -Postoperative course complicated by anastomotic biliary stricture now treated with stents episode of  -Moderate ACR (5/9) in March 2021 treated with baseline increase in immunosuppression  -We will continue with labs per protocol     Immunosuppression:   - Was transitioned to cyclosporin from tacrolimus for concerns of delerium in the immediate post transplant period.  -Continues on cyclosporine with goal trough dose to ; has been on low-dose MMF given moderate rejection in March 2021  -We will wait until she has some clinical stability, then ideally I would like to rechallenge her with tacrolimus, as this could then be used as a single agent immunosuppressant moving forward  - Will plan to check immunosuppression trough levels per protocol to assess for adequate target dosing and will assess CBC and kidney function for toxicity of medications    Kidney Health:   - Intermittent acute kidney injury.  This is felt likely secondary to calcineurin inhibitor exposure as well as multiple medical comorbidities over the past year  -Has stage II/III CKD    Covid  -Tested positive historically, relatively asymptomatic  -Has now received first two doses of Moderna vaccine, then received booster on February 15, 2022.  I did discuss that if her labs today within normal limits after this vaccination that she should strongly consider a second booster, her  fourth overall shot in approximately 5 to 6 months.    History of ITP:   - Unclear if this was precipitated by Covid vaccine, knee surgery, or anti-infectives.  Resolved  -We are following her platelets closely in the setting of recent Covid vaccine booster     History of diverticulitis:  -She is already seen colorectal surgery in consultation at our request.  Noted watchful waiting at this time  -No overt indication for repeat colonoscopy or antibiotics at this time    Nutrition/Weight:    It is very common for patients to put on significant weight after liver transplantation, as they become conditioned to eating as much as possible to maintain a healthy weight pre-transplant.  There is a very significant risk of developing fatty liver and non-alcoholic steatohepatitis in post-transplant patients, even in those who were not transplanted for ANGULO cirrhosis.  - Please work on eating a diet that is rice in fresh fruits and vegetables, moderate amounts of lean protein and dairy, and modest amounts of carbohydrates and fats.  - An exercise plan/regimen is an essential part of remaining healthy in the post-transplant setting and we recommend 30-35 minutes of exercise at least 4 days a week     Routine Health Care:  - You need to establish and maintain care with a primary care physician to manage: hypertension, high cholesterol, abnormal blood sugars - as these are all potential complications of your health after liver transplantation and will need a local physician to manage these issues.  - All patients with liver diseaes (even post transplant) are at an increased risk for osteoporosis.  We strongly recommend screening for Vitamin D deficiency at least twice yearly with aggressive supplementation/replacement as indicated.    - We also recommend a screening DEXA scan to evaluate for osteoporosis.  If present, should treat with calcium, Vitamin D supplementation, and recommend consideration of bisphosphonate therapy.   Also recommend follow up DEXA scans to evaluate for improvement of bone density on therapy.  - Recommend yearly skin exams with dermatology, and if skin cancers are found, recommend twice yearly exams  - Recommend you stay up to date for cancer screening: mammograms/PAP for women and prostate cancer screening for men, and colon cancer screening for all.  - Practice good hygiene with washing of hands and maintaining a clean living space as this will decrease your chances of developing an infection in the post-transplantation setting  - Eat a health diet: rich in fresh fruits and vegetables, lean proteins, and minimize carbohydrate and fat intake.    Follow up: 6 months    Thomas M. Leventhal, M.D.   of Medicine  Advanced & Transplant Hepatology  [facility]

## 2022-03-01 ENCOUNTER — HOSPITAL ENCOUNTER (OUTPATIENT)
Dept: PHYSICAL THERAPY | Facility: CLINIC | Age: 62
Setting detail: THERAPIES SERIES
End: 2022-03-01
Attending: ORTHOPAEDIC SURGERY
Payer: COMMERCIAL

## 2022-03-01 ENCOUNTER — LAB (OUTPATIENT)
Dept: LAB | Facility: CLINIC | Age: 62
End: 2022-03-01
Payer: COMMERCIAL

## 2022-03-01 DIAGNOSIS — E03.9 HYPOTHYROIDISM (ACQUIRED): ICD-10-CM

## 2022-03-01 DIAGNOSIS — Z94.4 LIVER REPLACED BY TRANSPLANT (H): ICD-10-CM

## 2022-03-01 DIAGNOSIS — I51.9 MYXEDEMA HEART DISEASE: Primary | ICD-10-CM

## 2022-03-01 DIAGNOSIS — Z13.220 LIPID SCREENING: ICD-10-CM

## 2022-03-01 DIAGNOSIS — E03.9 MYXEDEMA HEART DISEASE: Primary | ICD-10-CM

## 2022-03-01 LAB
ALBUMIN SERPL-MCNC: 3.5 G/DL (ref 3.4–5)
ALP SERPL-CCNC: 115 U/L (ref 40–150)
ALT SERPL W P-5'-P-CCNC: 16 U/L (ref 0–50)
ANION GAP SERPL CALCULATED.3IONS-SCNC: 5 MMOL/L (ref 3–14)
AST SERPL W P-5'-P-CCNC: 23 U/L (ref 0–45)
BILIRUB DIRECT SERPL-MCNC: 0.3 MG/DL (ref 0–0.2)
BILIRUB SERPL-MCNC: 1.9 MG/DL (ref 0.2–1.3)
BUN SERPL-MCNC: 26 MG/DL (ref 7–30)
CALCIUM SERPL-MCNC: 9.3 MG/DL (ref 8.5–10.1)
CHLORIDE BLD-SCNC: 108 MMOL/L (ref 94–109)
CO2 SERPL-SCNC: 26 MMOL/L (ref 20–32)
CREAT SERPL-MCNC: 1.06 MG/DL (ref 0.52–1.04)
CYCLOSPORINE BLD LC/MS/MS-MCNC: 126 UG/L (ref 50–400)
ERYTHROCYTE [DISTWIDTH] IN BLOOD BY AUTOMATED COUNT: 12.3 % (ref 10–15)
GFR SERPL CREATININE-BSD FRML MDRD: 59 ML/MIN/1.73M2
GLUCOSE BLD-MCNC: 96 MG/DL (ref 70–99)
HCT VFR BLD AUTO: 41.3 % (ref 35–47)
HGB BLD-MCNC: 13 G/DL (ref 11.7–15.7)
MCH RBC QN AUTO: 31.9 PG (ref 26.5–33)
MCHC RBC AUTO-ENTMCNC: 31.5 G/DL (ref 31.5–36.5)
MCV RBC AUTO: 101 FL (ref 78–100)
PLATELET # BLD AUTO: 163 10E3/UL (ref 150–450)
POTASSIUM BLD-SCNC: 4.8 MMOL/L (ref 3.4–5.3)
PROT SERPL-MCNC: 7.4 G/DL (ref 6.8–8.8)
RBC # BLD AUTO: 4.08 10E6/UL (ref 3.8–5.2)
SODIUM SERPL-SCNC: 139 MMOL/L (ref 133–144)
TME LAST DOSE: NORMAL H
TME LAST DOSE: NORMAL H
TSH SERPL DL<=0.005 MIU/L-ACNC: 0.82 MU/L (ref 0.4–4)
WBC # BLD AUTO: 5.3 10E3/UL (ref 4–11)

## 2022-03-01 PROCEDURE — 97110 THERAPEUTIC EXERCISES: CPT | Mod: GP | Performed by: PHYSICAL MEDICINE & REHABILITATION

## 2022-03-01 PROCEDURE — 80053 COMPREHEN METABOLIC PANEL: CPT

## 2022-03-01 PROCEDURE — 84443 ASSAY THYROID STIM HORMONE: CPT

## 2022-03-01 PROCEDURE — 85027 COMPLETE CBC AUTOMATED: CPT

## 2022-03-01 PROCEDURE — 80158 DRUG ASSAY CYCLOSPORINE: CPT

## 2022-03-01 PROCEDURE — 36415 COLL VENOUS BLD VENIPUNCTURE: CPT

## 2022-03-01 PROCEDURE — 82248 BILIRUBIN DIRECT: CPT

## 2022-03-02 NOTE — ANESTHESIA PREPROCEDURE EVALUATION
Anesthesia Pre-Procedure Evaluation    Patient: Nicci Pemberton   MRN:     [id number] Gender:   female   Age:    59 year old :      1960        Preoperative Diagnosis: S/P liver transplant (H) [Z94.4]   Procedure(s):  ENDOSCOPIC RETROGRADE CHOLANGIOPANCREATOGRAPHY     Past Medical History:   Diagnosis Date     Anemia      Cellulitis      Cirrhosis of liver (H) 2018     Heterozygous alpha 1-antitrypsin deficiency (H)      High hepatic iron concentration determined by biopsy of liver      Liver cirrhosis secondary to ANGULO (H)      Liver transplanted (H) 2019     Lymphedema      Osteoarthritis     knees     SBP (spontaneous bacterial peritonitis) (H) 2019 in [facility]      Past Surgical History:   Procedure Laterality Date     BENCH LIVER N/A 2019    Procedure: BACKBENCH PREPARATION, LIVER;  Surgeon: Mandeep Alford MD;  Location: [facility] OR     COLONOSCOPY N/A 2018    Procedure: COLONOSCOPY;  colonoscopy;  Surgeon: Hugo Patel MD;  Location: [facility]     ESOPHAGOSCOPY, GASTROSCOPY, DUODENOSCOPY (EGD), COMBINED N/A 10/31/2019    Procedure: Esophagogastroduodenoscopy, With Biopsy;  Surgeon: Nerissa Aranda MD;  Location: [facility] GI     IR FEEDING TUBE PLACEMENT W MOHAN/MD  2019     IR FLUORO 0-1 HOUR  2019     IR LUMBAR PUNCTURE  2019     TRANSPLANT LIVER RECIPIENT  DONOR N/A 2019    Procedure: TRANSPLANT, LIVER, RECIPIENT,  DONOR;  Surgeon: Mandeep Alford MD;  Location: [facility] OR          Anesthesia Evaluation     . Pt has had prior anesthetic. Type: General (MAC 4, 7 ETT)           ROS/MED HX    ENT/Pulmonary:  - neg pulmonary ROS     Neurologic:  - neg neurologic ROS     Cardiovascular:  - neg cardiovascular ROS   (+) ----. : . . . :. . Previous cardiac testing date:results:Stress Testdate:19 results:dobutamine stress echocardiogram test negative for ischemia at diagnostic level of peak stress (87% MPHR). date: results: date:  [facility]  OPERATIVE REPORT    Name:  Jossie Lucas  MR#:  [id number]  :  1942  ACCOUNT #:  [de-identified]  DATE OF SERVICE:  2022    PREOPERATIVE DIAGNOSIS:  Age-related nuclear cataract, right eye. POSTOPERATIVE DIAGNOSIS:  Pseudophakia, right eye. 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 SURGEON:  Kiah Soto MD    ASSISTANT:  None. ANESTHESIA:  Topical 3.5% lidocaine gel, 0.5% tetracaine drops, IV sedation by Anesthesia, and 2% preservative-free intraocular lidocaine. COMPLICATIONS:  None. SPECIMENS REMOVED:  None. IMPLANTS:  IOL. ESTIMATED BLOOD LOSS:  None. INDICATIONS:  This 80-year-old white male with advanced Parkinson's noted painless progressive decreased visual acuity in both eyes secondary to cataract formation affecting distance and near vision, driving, with hand controls, and reading. He presents for an elective extracapsular cataract extraction with lens implant in his right eye to improve vision and lifestyle. In addition to cataracts and Parkinson's disease, he has hypertension, elevated cholesterol, GERD, and diet-controlled diabetes. His current medications include Requip, magnesium sulfate, Remeron, cyanocobalamin, Diovan HCT, Zoloft, Mobic, vitamin D3, cinnamon, Lipitor, B6, B12, and C and lactulose. He has no known medicine allergies and was cleared for surgery by Buck Mcclellan MD.  Ocular examination at the time of admission reveals a best corrected visual acuity with glare of 20/70 in the right eye and 20/80 in the left eye with intraocular pressures of 10 mmHg both eyes. Extraocular examination reveals full fields to confrontation with normal motility, with acne rosacea, bilateral ptosis, dermatochalasis, poor blink, and dry eyes.   Anterior segment shows normal conjunctiva with clear corneas, open "results:          METS/Exercise Tolerance:     Hematologic:     (+) Anemia, -      Musculoskeletal:   (+) arthritis,  -       GI/Hepatic: Comment: Hx of ANGULO and Heterozygous alpha 1 antitrypsin deficiency s/p Liver Transplant 8/18/19.  Elevated LFTs  Per imaging report \"common bile duct measures 8 mm with nonspecific echogenic intraluminal content\"    (+) GERD       Renal/Genitourinary:     (+) chronic renal disease,       Endo:  - neg endo ROS       Psychiatric:  - neg psychiatric ROS       Infectious Disease:  - neg infectious disease ROS       Malignancy:      - no malignancy   Other:    - neg other ROS                     PHYSICAL EXAM:   Mental Status/Neuro: A/A/O; Age Appropriate   Airway: Facies: Feasible  Mallampati: I  Mouth/Opening: Full  TM distance: > 6 cm  Neck ROM: Full   Respiratory: Auscultation: CTAB     Resp. Rate: Normal     Resp. Effort: Normal      CV: Rhythm: Regular  Edema: None   Comments:      Dental: Normal Dentition                LABS:  CBC:   Lab Results   Component Value Date    WBC 3.0 (L) 01/29/2020    WBC 4.1 01/21/2020    HGB 9.3 (L) 01/29/2020    HGB 8.8 (L) 01/21/2020    HCT 28.9 (L) 01/29/2020    HCT 27.4 (L) 01/21/2020     (L) 01/29/2020     (L) 01/21/2020     BMP:   Lab Results   Component Value Date     01/29/2020     01/21/2020    POTASSIUM 4.5 01/29/2020    POTASSIUM 4.5 01/21/2020    CHLORIDE 107 01/29/2020    CHLORIDE 110 (H) 01/21/2020    CO2 31 01/29/2020    CO2 28 01/21/2020    BUN 40 (H) 01/29/2020    BUN 41 (H) 01/21/2020    CR 1.15 (H) 01/29/2020    CR 1.03 01/21/2020    GLC 97 01/29/2020    GLC 94 01/21/2020     COAGS:   Lab Results   Component Value Date    PTT 31 10/05/2019    INR 1.19 (H) 10/05/2019    FIBR 317 08/24/2019     POC:   Lab Results   Component Value Date     (H) 09/19/2019    HCGS Negative 06/18/2018     OTHER:   Lab Results   Component Value Date    PH 7.46 (H) 09/11/2019    LACT 0.7 09/08/2019    A1C 5.0 08/18/2019 " angles, deep clear chambers. Equal, round, reactive pupils with normal iris. He dilates poorly. He has +6 nuclear sclerotic lens changes, right eye. Dilated fundus shows a 0.3 cup-to-disk ratio, normal blood vessels, and dull foveal reflexes. DESCRIPTION OF PROCEDURE:  The patient was taken to the main operating room after receiving pupillary dilation, application of Honan balloon, topical anesthesia in the pre-op area, placed in the supine position and given IV sedation by Anesthesia. The patient was prepped and draped in the standard fashion for intraocular microsurgery of the right eye with a temporal approach. A limbal paracentesis was made with a 15-degree blade and the anterior chamber filled with Viscoat. A pranay keratome was used to enter the anterior chamber from the temporal limbus in a four-plane fashion creating a 3 mm self-sealing corneal tunnel incision. A Malyugin pupillary expanding ring was introduced into the anterior chamber and the pupil engaged at the 9, 12, 3 and 6 o'clock positions. An anterior capsulorrhexis was performed with capsulorrhexis forceps followed by hydrodissection and hydrodelineation of the nucleus in the capsular bag with balanced salt solution achieving rotation. A CDE of 13.20, phacoemulsification time of 2 minutes 7.8 seconds, at an average power of 3.9% was required to remove the nucleus in a phaco chop technique in burst mode at high vacuum using OZil technology followed by irrigation/aspiration of the remaining cortex and vacuum polishing of the posterior capsule. The capsular bag was then filled with Provisc and an Manolo AcrySof posterior chamber foldable acrylic intraocular lens, model [id number], power +24.5 diopters, was injected into the capsular bag using an Manolo cartridge and the lens centered. A lens manipulator was then introduced into the anterior chamber and dislodged the Malyugin ring from the pupil.   The  was reintroduced into the "   JACOB 9.2 01/29/2020    PHOS 4.1 01/29/2020    MAG 1.8 01/29/2020    ALBUMIN 3.6 01/29/2020    PROTTOTAL 6.9 01/29/2020    ALT 87 (H) 01/29/2020    AST 49 (H) 01/29/2020    ALKPHOS 182 (H) 01/29/2020    BILITOTAL 2.0 (H) 01/29/2020    LIPASE 2,509 (H) 08/19/2019    AMYLASE 326 (H) 08/19/2019    DOROTHEA 23 09/04/2019    TSH 0.81 01/29/2020    T4 0.70 (L) 09/11/2019    CRP 8.9 (H) 06/18/2018    SED 17 06/18/2018        Preop Vitals    BP Readings from Last 3 Encounters:   01/23/20 124/79   01/08/20 119/78   11/22/19 96/68    Pulse Readings from Last 3 Encounters:   01/23/20 85   01/08/20 68   11/22/19 77      Resp Readings from Last 3 Encounters:   11/19/19 16   10/31/19 17   10/30/19 16    SpO2 Readings from Last 3 Encounters:   01/23/20 96%   11/22/19 99%   11/19/19 93%      Temp Readings from Last 1 Encounters:   01/23/20 36.7  C (98.1  F) (Oral)    Ht Readings from Last 1 Encounters:   01/23/20 1.575 m (5' 2\")      Wt Readings from Last 1 Encounters:   01/23/20 81 kg (178 lb 8 oz)    Estimated body mass index is 32.65 kg/m  as calculated from the following:    Height as of 1/23/20: 1.575 m (5' 2\").    Weight as of 1/23/20: 81 kg (178 lb 8 oz).     LDA:        Assessment:   ASA SCORE: 2    H&P: History and physical reviewed and following examination; no interval change.   Smoking Status:  Non-Smoker/Unknown   NPO Status: NPO Appropriate     Plan:   Anes. Type:  General   Pre-Medication: Midazolam   Induction:  IV (Standard)   Airway: ETT; Oral   Access/Monitoring: PIV   Maintenance: Balanced     Postop Plan:   Postop Pain: Opioids  Postop Sedation/Airway: Not planned  Disposition: Outpatient     PONV Management:   Adult Risk Factors: Female, Non-Smoker, Postop Opioids   Prevention: Ondansetron, Dexamethasone     CONSENT: Direct conversation   Plan and risks discussed with: Patient   Blood Products: Consent Deferred (Minimal Blood Loss)                   Edel Pace MD  " anterior chamber, the ring grasped and withdrawn into the  for removal.    Irrigation/aspiration was used to remove the Provisc from the anterior chamber and capsular bag. The anterior chamber was filled with balanced salt solution and the incisions tested and found to be water-tight without a suture. A collagen shield soaked in Tobradex ophthalmic drops and tetracaine drops was placed on the cornea followed by Pred-G ophthalmic ointment. The speculum and drape were then removed and an eye shield taped over the eye. The patient tolerated the procedure well and left the operating room for the step-down unit under the care of Anesthesia in a satisfactory postop condition, alert and awake. The patient is to be discharged home when released by Anesthesia, to remain at bed rest with head elevated for the next 24 hours, resume all customary preop diet and medications and take Tylenol as needed for discomfort. A followup appointment is scheduled in my office on 03/03 at 2:15 p.m.       Inge Dandy, MD      [id number]  D:  03/02/2022 8:30  T:  03/02/2022 10:45  JOB #:  [id number]

## 2022-03-04 ENCOUNTER — TELEPHONE (OUTPATIENT)
Dept: TRANSPLANT | Facility: CLINIC | Age: 62
End: 2022-03-04
Payer: COMMERCIAL

## 2022-03-04 NOTE — TELEPHONE ENCOUNTER
Pt wanted to clarify if she needed to do labs in 2 months or if she needs to do more often. Patient to do labs every 2 months .

## 2022-03-15 ENCOUNTER — HOSPITAL ENCOUNTER (OUTPATIENT)
Dept: PHYSICAL THERAPY | Facility: CLINIC | Age: 62
Setting detail: THERAPIES SERIES
Discharge: HOME | End: 2022-03-15
Attending: ORTHOPAEDIC SURGERY
Payer: COMMERCIAL

## 2022-03-15 PROCEDURE — 97110 THERAPEUTIC EXERCISES: CPT | Mod: GP | Performed by: PHYSICAL MEDICINE & REHABILITATION

## 2022-03-15 NOTE — PROGRESS NOTES
[facility] Service    Outpatient Physical Therapy Discharge Note  Patient: Nicci Pemberton  : 1960    Beginning/End Dates of Reporting Period:  21 to 3/15/22    Referring Provider: Urszula Kline MD    Therapy Diagnosis: L shoulder pain secondary to dislocation and fracture     Client Self Report: Pt reports L shoulder doing pretty good, notes with time it keeps getting better. Still has some discomfort with shoulder abd but otherwise states recovery going well.     Objective Measurements:  Objective Measure: L shoulder AROM  Details: flexion: 173*, abd: 170*, ER: T3/4, IR: T7/8  Objective Measure: L shoulder strength  Details:  all motions    Outcome Measures (most recent score):  SPADI: 3.85% disability (78.89% at initial eval)    Goals:  Goal Identifier 1   Goal Description Pt able to flex L shoulder 120* in order to decrease difficulty with reaching overhead.    Target Date 21   Date Met  22   Progress (detail required for progress note):       Goal Identifier 2   Goal Description Pt will be able to abd L shoulder 120* in order to decrease difficulty with reaching overhead   Target Date 22   Date Met  22   Progress (detail required for progress note):       Goal Identifier 3   Goal Description Pt will be able to demonstrate 5-/5 L shoulder strength in order to decrease difficulty with lifting and carrying.   Target Date 22   Date Met  03/15/22   Progress (detail required for progress note):       Goal Identifier 4   Goal Description Pt will be independent with HEP in order to self manage symptoms.    Target Date 22   Date Met  22   Progress (detail required for progress note):       Goal Identifier 5   Goal Description Pt will improve SPADI by 20%, indicating decreased difficulty with ADLs.    Target Date 22   Date Met  22   Progress (detail  required for progress note):       Progress toward goals: Pt has attended 12 PT sessions, achieving 5/5 short term and long term goals. Pt's ROM, strength and pain have improved significantly since starting PT, and pt has returned to near full PLOF. Pt is appropriate for D/C at this time as she has met all goals and is independent with HEP.     Plan:  Discharge from therapy.    Discharge:    Reason for Discharge: Patient has met all goals.    Equipment Issued: [employer]    Discharge Plan: Patient to continue home program.    Please contact me with any questions or concerns.    Thank you for your referral,     Martita Milligan, PT, DPT  Physical Therapist  [address] [address] [phone]

## 2022-03-26 ENCOUNTER — HEALTH MAINTENANCE LETTER (OUTPATIENT)
Age: 62
End: 2022-03-26

## 2022-04-19 NOTE — PATIENT INSTRUCTIONS - HE
History  Chief Complaint   Patient presents with    Psychiatric Evaluation     pt presents with SI and rapid thoughts  States that he thinks he is autistic and wants to be checked for that  Denies HI  Denies plan  Pt is cooperative  Denies AH/VH  Abel De La Garza is a 39 YOM who presents for increasing depression  Denies SI at this time but is afraid it may progress to that  He does not have a plan at this time, denies HI, has no thoughts to harm self or others  He states he has been struggling with depression since his girlfriend  in  prior to the start of the COVID-19 pandemic  His  girlfriend's mother reached out to him approximately 2-3 months ago regarding her burial location and he states it has since led to worsening depression  He has had what he describes "racing thoughts" and increased anxiety as well  During his initial assessment he states he believes he has undiagnosed autism which comes from literature and screening multiple Internet searches  He denies auditory or visual hallucinations  States he has not ingested any medications other than his prescriptions as directed  States he is willing to go to a treatment facility  Prior to Admission Medications   Prescriptions Last Dose Informant Patient Reported? Taking? QUEtiapine (SEROquel) 25 mg tablet   Yes No   Si tablet   buPROPion (ZYBAN) 150 MG 12 hr tablet   Yes No   Sig: take 1 tablet by mouth every morning for 3 days then INCREASE to      (REFER TO PRESCRIPTION NOTES)  venlafaxine (EFFEXOR) 25 mg tablet   Yes No   Sig: Every 12 hours      Facility-Administered Medications: None       Past Medical History:   Diagnosis Date    Anxiety     Depression        Past Surgical History:   Procedure Laterality Date    CHEST WALL RECONSTRUCTION      WISDOM TOOTH EXTRACTION         History reviewed  No pertinent family history  I have reviewed and agree with the history as documented      E-Cigarette/Vaping Patient Instructions by Virgie Urbano RN at 7/29/2019  7:40 AM     Author: Virgie Urbano RN Service: -- Author Type: Registered Nurse    Filed: 7/29/2019  9:03 AM Encounter Date: 7/29/2019 Status: Addendum    : Virgie Urbano RN (Registered Nurse)    Related Notes: Original Note by Virgie Urbano RN (Registered Nurse) filed at 7/29/2019  8:57 AM       A referral was sent to [facility]. You will receive a phone call in 1-2 business days to schedule you appointment. If for some reason you do not hear from them or wish to schedule sooner please call [phone] to schedule.  [facility] - [address], [address]  [place] [address]        SWELLING/LYMPHEDEMA THERAPY TREATMENT     - What to expect your first visit     Based on your initial evaluation, you will have an individualized program designed by a certified lymphedema therapist, with specialized training in swelling.     It will consist of discussing your prior activity level, goals and limitations.     The therapist will assess your edema, evaluate for swelling, ,measure your motion and strength.      - Treatment usually includes     Swelling education and prevention strategies.     Swelling/lymphedema massage- A special decompressive massage to help improve the lymphatic drainage.     Swelling/lymphatic stimulation exercises     Bandaging or compression garments- To provide increased tissue pressure in the swollen extremity.     Home exercise program instruction     Stretching exercises     Education in how to independently manage edema.        - Follow up care     ONCE FORMAL THERAPY HAS BEEN COMPLETED, THE PATIENT WILL BE EXPECTED TO WEAR THE APPROPRIATE COMPRESSION BANDAGES, BE CONSISTENT WITH THE SKIN CARE PROGRAM  AND PERFORM THE PRESCRIBED SELF -MASSAGE AND /OR EXERCISES AS PRESCRIBED.      For Velcro Compression:  [facility]  (Please call to make an appointment about 2 weeks after beginning therapy)    [facility]  [address], [address]  [place], [place].  Phone: [phone]    [facility]  [address]  [place] [address] [phone]    Swelling in the legs can be caused by many reasons.  Treatment usually includes some type of compression. We are prescribing some velcro compression garments. Your compression should be put on first thing in the morning and taken off at night. It is  important to wear them with long periods of sitting/standing, long car rides or if you will be flying. Going without compression for even brief periods of time can be damaging to your legs and your health.  Compression Velcro may need to be replaced every 9-12 months.  Often the liners and socks need to be replaced every three or four months.  The neoprene velcro may last up to 2 years.  If the velcro becomes worn a tailor may be able to repair it for you inexpensively.  They do not need to be worn at night while in bed.     If we have seen you within the year we can refill your compression.  Otherwise, your primary care provider can refill them. Call us with any problems or questions.     Velcro Compression Wraps Instructions    1) Slide leg liner or Tubigrip onto the leg before applying the velcro warp.     2) Apply the velcro wrap over the leg liner. Pull bands to tighten for compression.     3) Start at the tightening from bottom of the velcro wrap.  It should start just above the ankle bone and the top should reach to just below the knee crease.    4) Angle each band individually to achieve a snug and wrinkle-free fit. Do not tuck the bands and the velcro should never touch the skin.    5) Lastly apply the anklet sock over the velcro wrap if instructed to do so. This should be worn over the velcro wrap due to sensitivity from the ribbed edge.    6) To remove the  E-Cigarette/Vaping Substances     Social History     Tobacco Use    Smoking status: Current Every Day Smoker     Packs/day: 3 00    Smokeless tobacco: Never Used   Substance Use Topics    Alcohol use: Never    Drug use: Yes     Types: Marijuana       Review of Systems   Psychiatric/Behavioral: Positive for decreased concentration  Negative for confusion, hallucinations, self-injury and suicidal ideas  The patient is nervous/anxious  All other systems reviewed and are negative  Physical Exam  Physical Exam  Vitals and nursing note reviewed  Constitutional:       General: He is not in acute distress  Appearance: He is not ill-appearing or diaphoretic  HENT:      Head: Normocephalic and atraumatic  Cardiovascular:      Rate and Rhythm: Normal rate and regular rhythm  Pulses: Normal pulses  Heart sounds: Normal heart sounds  No murmur heard  Pulmonary:      Effort: Pulmonary effort is normal  No respiratory distress  Breath sounds: Normal breath sounds  Abdominal:      General: Abdomen is flat  Palpations: Abdomen is soft  Tenderness: There is no abdominal tenderness  Musculoskeletal:      Cervical back: Normal range of motion and neck supple  Skin:     General: Skin is warm and dry  Neurological:      General: No focal deficit present  Mental Status: He is alert and oriented to person, place, and time  Psychiatric:      Comments: Increasing depression    Racing thoughts         Vital Signs  ED Triage Vitals   Temperature Pulse Respirations Blood Pressure SpO2   04/19/22 1042 04/19/22 1042 04/19/22 1042 04/19/22 1042 04/19/22 1042   98 2 °F (36 8 °C) 99 20 123/85 99 %      Temp Source Heart Rate Source Patient Position - Orthostatic VS BP Location FiO2 (%)   04/19/22 1042 04/19/22 1042 04/19/22 1042 04/19/22 1042 --   Temporal Monitor Sitting Right arm       Pain Score       04/19/22 1045       No Pain           Vitals:    04/19/22 1042 04/19/22 1300 BP: 123/85 110/64   Pulse: 99 88   Patient Position - Orthostatic VS: Sitting Sitting         Visual Acuity      ED Medications  Medications   nicotine (NICODERM CQ) 7 mg/24hr TD 24 hr patch 7 mg (7 mg Transdermal Medication Applied 4/19/22 1248)       Diagnostic Studies  Results Reviewed     Procedure Component Value Units Date/Time    POCT alcohol breath test [[id number]]  (Normal) Resulted: 04/19/22 1145    Lab Status: Final result Updated: 04/19/22 1145     EXTBreath Alcohol 0 00                 No orders to display              Procedures  Procedures         ED Course                                             MDM  Number of Diagnoses or Management Options  Depression: established and worsening  Diagnosis management comments: Patient presents with increasing depression, negative SI at this time, but concerns that he may progress to SI  Medically cleared, crisis eval pending  Disposition  Final diagnoses:   Depression     Time reflects when diagnosis was documented in both MDM as applicable and the Disposition within this note     Time User Action Codes Description Comment    4/19/2022  1:05 PM Jovita Bagley Add Gregorio FIGUEROA] Depression       ED Disposition     ED Disposition Condition Date/Time Comment    Transfer to [address] [place] [address] Apr 19, 2022 11:45 AM Inge Mckeongideon has been medically cleared and is pending crisis evaluation  Follow-up Information    None         Patient's Medications   Discharge Prescriptions    No medications on file       No discharge procedures on file      PDMP Review     None          ED Provider  Electronically Signed by           Randell Kee PA-C  04/19/22 2006 velcro wrap, undo each band and fold it back on itself. If done properly the garment should be ready for easy application.    7) To extend the life of velcro wraps, hand wash and hang dry.         DO NOT STRAP VELCRO TO LEG LINER OR TUBRIGRIP!!!  1.2.        3.4.

## 2022-05-03 ENCOUNTER — LAB (OUTPATIENT)
Dept: LAB | Facility: CLINIC | Age: 62
End: 2022-05-03
Payer: COMMERCIAL

## 2022-05-03 DIAGNOSIS — Z13.220 LIPID SCREENING: ICD-10-CM

## 2022-05-03 DIAGNOSIS — Z94.4 LIVER REPLACED BY TRANSPLANT (H): ICD-10-CM

## 2022-05-03 LAB
ALBUMIN SERPL-MCNC: 3.4 G/DL (ref 3.4–5)
ALP SERPL-CCNC: 116 U/L (ref 40–150)
ALT SERPL W P-5'-P-CCNC: 21 U/L (ref 0–50)
ANION GAP SERPL CALCULATED.3IONS-SCNC: 4 MMOL/L (ref 3–14)
AST SERPL W P-5'-P-CCNC: 38 U/L (ref 0–45)
BILIRUB DIRECT SERPL-MCNC: 0.2 MG/DL (ref 0–0.2)
BILIRUB SERPL-MCNC: 1.6 MG/DL (ref 0.2–1.3)
BUN SERPL-MCNC: 23 MG/DL (ref 7–30)
CALCIUM SERPL-MCNC: 9.2 MG/DL (ref 8.5–10.1)
CHLORIDE BLD-SCNC: 106 MMOL/L (ref 94–109)
CO2 SERPL-SCNC: 29 MMOL/L (ref 20–32)
CREAT SERPL-MCNC: 1.07 MG/DL (ref 0.52–1.04)
CYCLOSPORINE BLD LC/MS/MS-MCNC: 109 UG/L (ref 50–400)
ERYTHROCYTE [DISTWIDTH] IN BLOOD BY AUTOMATED COUNT: 12.7 % (ref 10–15)
GFR SERPL CREATININE-BSD FRML MDRD: 59 ML/MIN/1.73M2
GLUCOSE BLD-MCNC: 98 MG/DL (ref 70–99)
HCT VFR BLD AUTO: 40.3 % (ref 35–47)
HGB BLD-MCNC: 13.4 G/DL (ref 11.7–15.7)
MCH RBC QN AUTO: 33.1 PG (ref 26.5–33)
MCHC RBC AUTO-ENTMCNC: 33.3 G/DL (ref 31.5–36.5)
MCV RBC AUTO: 100 FL (ref 78–100)
PLATELET # BLD AUTO: 165 10E3/UL (ref 150–450)
POTASSIUM BLD-SCNC: 5.2 MMOL/L (ref 3.4–5.3)
PROT SERPL-MCNC: 7.6 G/DL (ref 6.8–8.8)
RBC # BLD AUTO: 4.05 10E6/UL (ref 3.8–5.2)
SODIUM SERPL-SCNC: 139 MMOL/L (ref 133–144)
TME LAST DOSE: NORMAL H
TME LAST DOSE: NORMAL H
WBC # BLD AUTO: 6.3 10E3/UL (ref 4–11)

## 2022-05-03 PROCEDURE — 85027 COMPLETE CBC AUTOMATED: CPT

## 2022-05-03 PROCEDURE — 80053 COMPREHEN METABOLIC PANEL: CPT

## 2022-05-03 PROCEDURE — 80158 DRUG ASSAY CYCLOSPORINE: CPT

## 2022-05-03 PROCEDURE — 82248 BILIRUBIN DIRECT: CPT

## 2022-05-03 PROCEDURE — 36415 COLL VENOUS BLD VENIPUNCTURE: CPT

## 2022-05-16 ENCOUNTER — TELEPHONE (OUTPATIENT)
Dept: TRANSPLANT | Facility: CLINIC | Age: 62
End: 2022-05-16
Payer: COMMERCIAL

## 2022-05-16 NOTE — TELEPHONE ENCOUNTER
Pt calls and wanted to know what she could take OTC for allergies. Suggested Claritin, zyrtec or mucinex. Pt will try one of them.

## 2022-06-29 ENCOUNTER — MYC MEDICAL ADVICE (OUTPATIENT)
Dept: DERMATOLOGY | Facility: CLINIC | Age: 62
End: 2022-06-29

## 2022-06-29 NOTE — TELEPHONE ENCOUNTER
Can we try to find her a time before I go on leave and block her 7/13 appt so we can use it for someone else if needeD?

## 2022-07-06 ENCOUNTER — LAB (OUTPATIENT)
Dept: LAB | Facility: CLINIC | Age: 62
End: 2022-07-06
Payer: COMMERCIAL

## 2022-07-06 DIAGNOSIS — Z94.4 LIVER REPLACED BY TRANSPLANT (H): ICD-10-CM

## 2022-07-06 DIAGNOSIS — Z13.220 LIPID SCREENING: ICD-10-CM

## 2022-07-06 LAB
ALBUMIN SERPL-MCNC: 3.5 G/DL (ref 3.4–5)
ALP SERPL-CCNC: 105 U/L (ref 40–150)
ALT SERPL W P-5'-P-CCNC: 18 U/L (ref 0–50)
ANION GAP SERPL CALCULATED.3IONS-SCNC: 2 MMOL/L (ref 3–14)
AST SERPL W P-5'-P-CCNC: 23 U/L (ref 0–45)
BILIRUB DIRECT SERPL-MCNC: 0.4 MG/DL (ref 0–0.2)
BILIRUB SERPL-MCNC: 1.8 MG/DL (ref 0.2–1.3)
BUN SERPL-MCNC: 22 MG/DL (ref 7–30)
CALCIUM SERPL-MCNC: 9.2 MG/DL (ref 8.5–10.1)
CHLORIDE BLD-SCNC: 109 MMOL/L (ref 94–109)
CO2 SERPL-SCNC: 29 MMOL/L (ref 20–32)
CREAT SERPL-MCNC: 1.01 MG/DL (ref 0.52–1.04)
ERYTHROCYTE [DISTWIDTH] IN BLOOD BY AUTOMATED COUNT: 12.1 % (ref 10–15)
GFR SERPL CREATININE-BSD FRML MDRD: 63 ML/MIN/1.73M2
GLUCOSE BLD-MCNC: 90 MG/DL (ref 70–99)
HCT VFR BLD AUTO: 38.5 % (ref 35–47)
HGB BLD-MCNC: 12.9 G/DL (ref 11.7–15.7)
MCH RBC QN AUTO: 33.3 PG (ref 26.5–33)
MCHC RBC AUTO-ENTMCNC: 33.5 G/DL (ref 31.5–36.5)
MCV RBC AUTO: 100 FL (ref 78–100)
PLATELET # BLD AUTO: 155 10E3/UL (ref 150–450)
POTASSIUM BLD-SCNC: 5.1 MMOL/L (ref 3.4–5.3)
PROT SERPL-MCNC: 7.2 G/DL (ref 6.8–8.8)
RBC # BLD AUTO: 3.87 10E6/UL (ref 3.8–5.2)
SODIUM SERPL-SCNC: 140 MMOL/L (ref 133–144)
WBC # BLD AUTO: 5.5 10E3/UL (ref 4–11)

## 2022-07-06 PROCEDURE — 80053 COMPREHEN METABOLIC PANEL: CPT

## 2022-07-06 PROCEDURE — 80158 DRUG ASSAY CYCLOSPORINE: CPT

## 2022-07-06 PROCEDURE — 85027 COMPLETE CBC AUTOMATED: CPT

## 2022-07-06 PROCEDURE — 82248 BILIRUBIN DIRECT: CPT

## 2022-07-06 PROCEDURE — 36415 COLL VENOUS BLD VENIPUNCTURE: CPT

## 2022-07-07 DIAGNOSIS — M25.562 CHRONIC PAIN OF LEFT KNEE: Primary | ICD-10-CM

## 2022-07-07 DIAGNOSIS — G89.29 CHRONIC PAIN OF LEFT KNEE: Primary | ICD-10-CM

## 2022-07-07 LAB
CYCLOSPORINE BLD LC/MS/MS-MCNC: 116 UG/L (ref 50–400)
TME LAST DOSE: NORMAL H
TME LAST DOSE: NORMAL H

## 2022-07-08 ENCOUNTER — TELEPHONE (OUTPATIENT)
Dept: TRANSPLANT | Facility: CLINIC | Age: 62
End: 2022-07-08

## 2022-07-11 DIAGNOSIS — Z94.4 LIVER REPLACED BY TRANSPLANT (H): Primary | ICD-10-CM

## 2022-07-12 ENCOUNTER — TELEPHONE (OUTPATIENT)
Dept: PHARMACY | Facility: CLINIC | Age: 62
End: 2022-07-12

## 2022-07-12 ENCOUNTER — IMMUNIZATION (OUTPATIENT)
Dept: FAMILY MEDICINE | Facility: CLINIC | Age: 62
End: 2022-07-12
Payer: COMMERCIAL

## 2022-07-12 PROCEDURE — 91306 COVID-19,PF,MODERNA (18+ YRS BOOSTER .25ML): CPT

## 2022-07-12 PROCEDURE — 0064A COVID-19,PF,MODERNA (18+ YRS BOOSTER .25ML): CPT

## 2022-07-12 NOTE — TELEPHONE ENCOUNTER
Wondering what Pneumonia vaccine she should receive.     Has had Pneumovax 23 2-3 times, but no record of Prevnar 13.     1. Due for Prevnar 20 since we do not have record of a Prevnar 13.   2. Recommended Hep B double dosed schedule:  -Engerix-B 20 mcg/mL: Administer 2 mL per dose at 0, 1, 2, and 6 months  OR  -Recombivax HB 40 mcg/mL: Administer 1 mL per dose at 0, 1, and 6 months    Nawaf Browne, PharmD  [facility] Pharmacist    Phone: [phone]

## 2022-07-19 ENCOUNTER — LAB (OUTPATIENT)
Dept: LAB | Facility: CLINIC | Age: 62
End: 2022-07-19
Payer: COMMERCIAL

## 2022-07-19 DIAGNOSIS — Z94.4 LIVER REPLACED BY TRANSPLANT (H): ICD-10-CM

## 2022-07-19 LAB
ALBUMIN SERPL-MCNC: 3.4 G/DL (ref 3.4–5)
ALP SERPL-CCNC: 106 U/L (ref 40–150)
ALT SERPL W P-5'-P-CCNC: 19 U/L (ref 0–50)
AST SERPL W P-5'-P-CCNC: 22 U/L (ref 0–45)
BILIRUB DIRECT SERPL-MCNC: 0.3 MG/DL (ref 0–0.2)
BILIRUB SERPL-MCNC: 1.5 MG/DL (ref 0.2–1.3)
ERYTHROCYTE [DISTWIDTH] IN BLOOD BY AUTOMATED COUNT: 11.8 % (ref 10–15)
HCT VFR BLD AUTO: 37.6 % (ref 35–47)
HGB BLD-MCNC: 12.8 G/DL (ref 11.7–15.7)
MCH RBC QN AUTO: 33.7 PG (ref 26.5–33)
MCHC RBC AUTO-ENTMCNC: 34 G/DL (ref 31.5–36.5)
MCV RBC AUTO: 99 FL (ref 78–100)
PLATELET # BLD AUTO: 144 10E3/UL (ref 150–450)
PROT SERPL-MCNC: 7.2 G/DL (ref 6.8–8.8)
RBC # BLD AUTO: 3.8 10E6/UL (ref 3.8–5.2)
WBC # BLD AUTO: 5.5 10E3/UL (ref 4–11)

## 2022-07-19 PROCEDURE — 85027 COMPLETE CBC AUTOMATED: CPT

## 2022-07-19 PROCEDURE — 36415 COLL VENOUS BLD VENIPUNCTURE: CPT

## 2022-07-19 PROCEDURE — 80076 HEPATIC FUNCTION PANEL: CPT

## 2022-07-26 ENCOUNTER — LAB (OUTPATIENT)
Dept: LAB | Facility: CLINIC | Age: 62
End: 2022-07-26
Payer: COMMERCIAL

## 2022-07-26 DIAGNOSIS — Z94.4 LIVER REPLACED BY TRANSPLANT (H): ICD-10-CM

## 2022-07-26 LAB
ALBUMIN SERPL-MCNC: 3.8 G/DL (ref 3.4–5)
ALP SERPL-CCNC: 110 U/L (ref 40–150)
ALT SERPL W P-5'-P-CCNC: 18 U/L (ref 0–50)
AST SERPL W P-5'-P-CCNC: 24 U/L (ref 0–45)
BILIRUB DIRECT SERPL-MCNC: 0.4 MG/DL (ref 0–0.2)
BILIRUB SERPL-MCNC: 2 MG/DL (ref 0.2–1.3)
ERYTHROCYTE [DISTWIDTH] IN BLOOD BY AUTOMATED COUNT: 12 % (ref 10–15)
HCT VFR BLD AUTO: 38.9 % (ref 35–47)
HGB BLD-MCNC: 13.1 G/DL (ref 11.7–15.7)
MCH RBC QN AUTO: 33.6 PG (ref 26.5–33)
MCHC RBC AUTO-ENTMCNC: 33.7 G/DL (ref 31.5–36.5)
MCV RBC AUTO: 100 FL (ref 78–100)
PLATELET # BLD AUTO: 146 10E3/UL (ref 150–450)
PROT SERPL-MCNC: 7.4 G/DL (ref 6.8–8.8)
RBC # BLD AUTO: 3.9 10E6/UL (ref 3.8–5.2)
WBC # BLD AUTO: 5.7 10E3/UL (ref 4–11)

## 2022-07-26 PROCEDURE — 85027 COMPLETE CBC AUTOMATED: CPT

## 2022-07-26 PROCEDURE — 36415 COLL VENOUS BLD VENIPUNCTURE: CPT

## 2022-07-26 PROCEDURE — 80076 HEPATIC FUNCTION PANEL: CPT

## 2022-08-02 ENCOUNTER — LAB (OUTPATIENT)
Dept: LAB | Facility: CLINIC | Age: 62
End: 2022-08-02
Payer: COMMERCIAL

## 2022-08-02 DIAGNOSIS — Z94.4 LIVER REPLACED BY TRANSPLANT (H): ICD-10-CM

## 2022-08-02 LAB
ALBUMIN SERPL-MCNC: 3.4 G/DL (ref 3.4–5)
ALP SERPL-CCNC: 105 U/L (ref 40–150)
ALT SERPL W P-5'-P-CCNC: 19 U/L (ref 0–50)
AST SERPL W P-5'-P-CCNC: 23 U/L (ref 0–45)
BILIRUB DIRECT SERPL-MCNC: 0.3 MG/DL (ref 0–0.2)
BILIRUB SERPL-MCNC: 1.6 MG/DL (ref 0.2–1.3)
ERYTHROCYTE [DISTWIDTH] IN BLOOD BY AUTOMATED COUNT: 11.9 % (ref 10–15)
HCT VFR BLD AUTO: 39.2 % (ref 35–47)
HGB BLD-MCNC: 13.2 G/DL (ref 11.7–15.7)
MCH RBC QN AUTO: 33.5 PG (ref 26.5–33)
MCHC RBC AUTO-ENTMCNC: 33.7 G/DL (ref 31.5–36.5)
MCV RBC AUTO: 100 FL (ref 78–100)
PLATELET # BLD AUTO: 157 10E3/UL (ref 150–450)
PROT SERPL-MCNC: 7.3 G/DL (ref 6.8–8.8)
RBC # BLD AUTO: 3.94 10E6/UL (ref 3.8–5.2)
WBC # BLD AUTO: 5.8 10E3/UL (ref 4–11)

## 2022-08-02 PROCEDURE — 36415 COLL VENOUS BLD VENIPUNCTURE: CPT

## 2022-08-02 PROCEDURE — 80076 HEPATIC FUNCTION PANEL: CPT

## 2022-08-02 PROCEDURE — 85027 COMPLETE CBC AUTOMATED: CPT

## 2022-08-05 NOTE — TELEPHONE ENCOUNTER
Cipro was ordered, but patient gets dizzy from it. Sent message to DR LEventhal for clarification.   
Patient called to follow up on her prescription also she would like the script sent to a new pharmacy in her chart [facility] in [place].  
Pt called to report the lab is stating it UA order only. UA to reflex to culture was ordered, but per patient they are unable to see.   
Pt paged that on Saturday she had some discomfort with urination. She increased water intake and felt it was better. Sunday patient had temp 99.2 and then went afebrile. This morning is having dysuria, and urinary frequency. Patient will go do urine sample.   
Spoke with patient to update her. amoxicillin sent to pharmacy in [place]. Patient to  and call with any trouble.   
Spoke with patient. She did cancel her knee replacement.   Patient does not want to try Cipro as she was severely dizzy.   
pt is independent/none

## 2022-08-09 ENCOUNTER — LAB (OUTPATIENT)
Dept: LAB | Facility: CLINIC | Age: 62
End: 2022-08-09
Payer: COMMERCIAL

## 2022-08-09 DIAGNOSIS — Z94.4 LIVER REPLACED BY TRANSPLANT (H): ICD-10-CM

## 2022-08-09 LAB
ALBUMIN SERPL-MCNC: 3.4 G/DL (ref 3.4–5)
ALP SERPL-CCNC: 111 U/L (ref 40–150)
ALT SERPL W P-5'-P-CCNC: 18 U/L (ref 0–50)
AST SERPL W P-5'-P-CCNC: 24 U/L (ref 0–45)
BILIRUB DIRECT SERPL-MCNC: 0.3 MG/DL (ref 0–0.2)
BILIRUB SERPL-MCNC: 1.4 MG/DL (ref 0.2–1.3)
ERYTHROCYTE [DISTWIDTH] IN BLOOD BY AUTOMATED COUNT: 12 % (ref 10–15)
HCT VFR BLD AUTO: 39.5 % (ref 35–47)
HGB BLD-MCNC: 13.2 G/DL (ref 11.7–15.7)
MCH RBC QN AUTO: 33.3 PG (ref 26.5–33)
MCHC RBC AUTO-ENTMCNC: 33.4 G/DL (ref 31.5–36.5)
MCV RBC AUTO: 100 FL (ref 78–100)
PLATELET # BLD AUTO: 151 10E3/UL (ref 150–450)
PROT SERPL-MCNC: 7.4 G/DL (ref 6.8–8.8)
RBC # BLD AUTO: 3.96 10E6/UL (ref 3.8–5.2)
WBC # BLD AUTO: 5.8 10E3/UL (ref 4–11)

## 2022-08-09 PROCEDURE — 80076 HEPATIC FUNCTION PANEL: CPT

## 2022-08-09 PROCEDURE — 85027 COMPLETE CBC AUTOMATED: CPT

## 2022-08-09 PROCEDURE — 36415 COLL VENOUS BLD VENIPUNCTURE: CPT

## 2022-08-10 ENCOUNTER — TELEPHONE (OUTPATIENT)
Dept: PHARMACY | Facility: CLINIC | Age: 62
End: 2022-08-10

## 2022-08-10 NOTE — TELEPHONE ENCOUNTER
Received voicemail from patient stating she has a question she needs to discuss with me via phone. She is requesting a call back.    Lynnette Gunn, Pharm.D.  Medication Therapy Management Pharmacist  [facility]

## 2022-08-11 NOTE — TELEPHONE ENCOUNTER
Spoke with patient. She reports she called [facility] pharmacy back and spoke with someone different who did authorize the PCV20 for her given her risk factors and she is scheduled to receive this vaccine next week. She is comfortable with the plan and I will inform Nawaf Browne PharmD that there is no further follow up needed at this time.     Lynnette Gunn, Pharm.D.  Medication Therapy Management Pharmacist  [facility]

## 2022-08-15 ENCOUNTER — TELEPHONE (OUTPATIENT)
Dept: TRANSPLANT | Facility: CLINIC | Age: 62
End: 2022-08-15

## 2022-08-15 DIAGNOSIS — T86.41 LIVER TRANSPLANT REJECTION (H): ICD-10-CM

## 2022-08-15 RX ORDER — MYCOPHENOLATE MOFETIL 250 MG/1
500 CAPSULE ORAL 2 TIMES DAILY
Qty: 360 CAPSULE | Refills: 3 | Status: SHIPPED | OUTPATIENT
Start: 2022-08-15 | End: 2022-08-24

## 2022-08-15 NOTE — TELEPHONE ENCOUNTER
Patient Call: Medication Refill  Route to LPN  Instruct the patient to first contact their pharmacy. If they have called their pharmacy and require further assistance, route to LPN.    Pharmacy Name: [facility]  Pharmacy Location: [facility]  Name of Medication: Mycophenolate Dose: 250 mg  90 day supply   When will the patient be out of this medication?: Less than 3 days (Route high priority)

## 2022-08-19 DIAGNOSIS — Z94.4 LIVER REPLACED BY TRANSPLANT (H): Primary | ICD-10-CM

## 2022-08-19 DIAGNOSIS — T86.41 LIVER TRANSPLANT REJECTION (H): ICD-10-CM

## 2022-08-23 ENCOUNTER — LAB (OUTPATIENT)
Dept: LAB | Facility: CLINIC | Age: 62
End: 2022-08-23
Payer: COMMERCIAL

## 2022-08-23 DIAGNOSIS — Z94.4 LIVER REPLACED BY TRANSPLANT (H): ICD-10-CM

## 2022-08-23 LAB
ALBUMIN SERPL-MCNC: 3.4 G/DL (ref 3.4–5)
ALP SERPL-CCNC: 111 U/L (ref 40–150)
ALT SERPL W P-5'-P-CCNC: 17 U/L (ref 0–50)
AST SERPL W P-5'-P-CCNC: 24 U/L (ref 0–45)
BILIRUB DIRECT SERPL-MCNC: 0.3 MG/DL (ref 0–0.2)
BILIRUB SERPL-MCNC: 2.1 MG/DL (ref 0.2–1.3)
ERYTHROCYTE [DISTWIDTH] IN BLOOD BY AUTOMATED COUNT: 11.9 % (ref 10–15)
HCT VFR BLD AUTO: 39.8 % (ref 35–47)
HGB BLD-MCNC: 13.3 G/DL (ref 11.7–15.7)
MCH RBC QN AUTO: 33.2 PG (ref 26.5–33)
MCHC RBC AUTO-ENTMCNC: 33.4 G/DL (ref 31.5–36.5)
MCV RBC AUTO: 99 FL (ref 78–100)
PLATELET # BLD AUTO: 147 10E3/UL (ref 150–450)
PROT SERPL-MCNC: 7.3 G/DL (ref 6.8–8.8)
RBC # BLD AUTO: 4.01 10E6/UL (ref 3.8–5.2)
WBC # BLD AUTO: 5.8 10E3/UL (ref 4–11)

## 2022-08-23 PROCEDURE — 85027 COMPLETE CBC AUTOMATED: CPT

## 2022-08-23 PROCEDURE — 36415 COLL VENOUS BLD VENIPUNCTURE: CPT

## 2022-08-23 PROCEDURE — 80076 HEPATIC FUNCTION PANEL: CPT

## 2022-08-24 DIAGNOSIS — T86.41 LIVER TRANSPLANT REJECTION (H): ICD-10-CM

## 2022-08-24 RX ORDER — MYCOPHENOLATE MOFETIL 250 MG/1
250 CAPSULE ORAL 2 TIMES DAILY
Qty: 180 CAPSULE | Refills: 3 | Status: SHIPPED | OUTPATIENT
Start: 2022-08-24 | End: 2023-10-20

## 2022-08-24 NOTE — TELEPHONE ENCOUNTER
Spoke with Nicci. He has questions about hep b vaccination since she has not developed a titer. Message to dr leventhal.

## 2022-08-30 ENCOUNTER — LAB (OUTPATIENT)
Dept: LAB | Facility: CLINIC | Age: 62
End: 2022-08-30
Payer: COMMERCIAL

## 2022-08-30 DIAGNOSIS — Z94.4 LIVER REPLACED BY TRANSPLANT (H): ICD-10-CM

## 2022-08-30 DIAGNOSIS — T86.41 LIVER TRANSPLANT REJECTION (H): ICD-10-CM

## 2022-08-30 LAB
ALBUMIN SERPL BCG-MCNC: 4 G/DL (ref 3.5–5.2)
ALP SERPL-CCNC: 106 U/L (ref 35–104)
ALT SERPL W P-5'-P-CCNC: 13 U/L (ref 10–35)
AST SERPL W P-5'-P-CCNC: 26 U/L (ref 10–35)
BILIRUB DIRECT SERPL-MCNC: 0.25 MG/DL (ref 0–0.3)
BILIRUB SERPL-MCNC: 1.2 MG/DL
ERYTHROCYTE [DISTWIDTH] IN BLOOD BY AUTOMATED COUNT: 12 % (ref 10–15)
HCT VFR BLD AUTO: 39.4 % (ref 35–47)
HGB BLD-MCNC: 12.9 G/DL (ref 11.7–15.7)
MCH RBC QN AUTO: 32.8 PG (ref 26.5–33)
MCHC RBC AUTO-ENTMCNC: 32.7 G/DL (ref 31.5–36.5)
MCV RBC AUTO: 100 FL (ref 78–100)
PLATELET # BLD AUTO: 148 10E3/UL (ref 150–450)
PROT SERPL-MCNC: 6.6 G/DL (ref 6.4–8.3)
RBC # BLD AUTO: 3.93 10E6/UL (ref 3.8–5.2)
WBC # BLD AUTO: 6.3 10E3/UL (ref 4–11)

## 2022-08-30 PROCEDURE — 85027 COMPLETE CBC AUTOMATED: CPT

## 2022-08-30 PROCEDURE — 80076 HEPATIC FUNCTION PANEL: CPT

## 2022-08-30 PROCEDURE — 36415 COLL VENOUS BLD VENIPUNCTURE: CPT

## 2022-09-06 ENCOUNTER — OFFICE VISIT (OUTPATIENT)
Dept: DERMATOLOGY | Facility: CLINIC | Age: 62
End: 2022-09-06
Payer: COMMERCIAL

## 2022-09-06 ENCOUNTER — LAB (OUTPATIENT)
Dept: LAB | Facility: CLINIC | Age: 62
End: 2022-09-06
Payer: COMMERCIAL

## 2022-09-06 DIAGNOSIS — Z94.4 LIVER REPLACED BY TRANSPLANT (H): ICD-10-CM

## 2022-09-06 DIAGNOSIS — L82.1 SEBORRHEIC KERATOSIS: Primary | ICD-10-CM

## 2022-09-06 DIAGNOSIS — Z13.220 LIPID SCREENING: ICD-10-CM

## 2022-09-06 DIAGNOSIS — D23.9 DERMATOFIBROMA: ICD-10-CM

## 2022-09-06 DIAGNOSIS — D49.2 NEOPLASM OF UNSPECIFIED BEHAVIOR OF BONE, SOFT TISSUE, AND SKIN: ICD-10-CM

## 2022-09-06 DIAGNOSIS — D22.9 MULTIPLE BENIGN NEVI: ICD-10-CM

## 2022-09-06 DIAGNOSIS — Z94.4: ICD-10-CM

## 2022-09-06 LAB
ALBUMIN SERPL BCG-MCNC: 4 G/DL (ref 3.5–5.2)
ALP SERPL-CCNC: 111 U/L (ref 35–104)
ALT SERPL W P-5'-P-CCNC: 14 U/L (ref 10–35)
ANION GAP SERPL CALCULATED.3IONS-SCNC: 8 MMOL/L (ref 7–15)
AST SERPL W P-5'-P-CCNC: 24 U/L (ref 10–35)
BILIRUB DIRECT SERPL-MCNC: 0.26 MG/DL (ref 0–0.3)
BILIRUB SERPL-MCNC: 2 MG/DL
BUN SERPL-MCNC: 27.6 MG/DL (ref 8–23)
CALCIUM SERPL-MCNC: 9.2 MG/DL (ref 8.8–10.2)
CHLORIDE SERPL-SCNC: 105 MMOL/L (ref 98–107)
CREAT SERPL-MCNC: 1.11 MG/DL (ref 0.51–0.95)
DEPRECATED HCO3 PLAS-SCNC: 26 MMOL/L (ref 22–29)
ERYTHROCYTE [DISTWIDTH] IN BLOOD BY AUTOMATED COUNT: 12.1 % (ref 10–15)
GFR SERPL CREATININE-BSD FRML MDRD: 56 ML/MIN/1.73M2
GLUCOSE SERPL-MCNC: 102 MG/DL (ref 70–99)
HCT VFR BLD AUTO: 38.9 % (ref 35–47)
HGB BLD-MCNC: 13.1 G/DL (ref 11.7–15.7)
MCH RBC QN AUTO: 33.6 PG (ref 26.5–33)
MCHC RBC AUTO-ENTMCNC: 33.7 G/DL (ref 31.5–36.5)
MCV RBC AUTO: 100 FL (ref 78–100)
PLATELET # BLD AUTO: 159 10E3/UL (ref 150–450)
POTASSIUM SERPL-SCNC: 5 MMOL/L (ref 3.4–5.3)
PROT SERPL-MCNC: 7.2 G/DL (ref 6.4–8.3)
RBC # BLD AUTO: 3.9 10E6/UL (ref 3.8–5.2)
SODIUM SERPL-SCNC: 139 MMOL/L (ref 136–145)
WBC # BLD AUTO: 5.8 10E3/UL (ref 4–11)

## 2022-09-06 PROCEDURE — 11102 TANGNTL BX SKIN SINGLE LES: CPT | Performed by: DERMATOLOGY

## 2022-09-06 PROCEDURE — 36415 COLL VENOUS BLD VENIPUNCTURE: CPT

## 2022-09-06 PROCEDURE — 99213 OFFICE O/P EST LOW 20 MIN: CPT | Mod: 25 | Performed by: DERMATOLOGY

## 2022-09-06 PROCEDURE — 80158 DRUG ASSAY CYCLOSPORINE: CPT

## 2022-09-06 PROCEDURE — 88305 TISSUE EXAM BY PATHOLOGIST: CPT | Mod: 26 | Performed by: DERMATOLOGY

## 2022-09-06 PROCEDURE — 88305 TISSUE EXAM BY PATHOLOGIST: CPT | Mod: TC | Performed by: DERMATOLOGY

## 2022-09-06 PROCEDURE — 80053 COMPREHEN METABOLIC PANEL: CPT

## 2022-09-06 PROCEDURE — 85027 COMPLETE CBC AUTOMATED: CPT

## 2022-09-06 PROCEDURE — 11103 TANGNTL BX SKIN EA SEP/ADDL: CPT | Performed by: DERMATOLOGY

## 2022-09-06 PROCEDURE — 82248 BILIRUBIN DIRECT: CPT

## 2022-09-06 NOTE — PATIENT INSTRUCTIONS
Wound Care After a Biopsy    What is a skin biopsy?  A skin biopsy allows the doctor to examine a very small piece of tissue under the microscope to determine the diagnosis and the best treatment for the skin condition. A local anesthetic (numbing medicine)  is injected with a very small needle into the skin area to be tested. A small piece of skin is taken from the area. Sometimes a suture (stitch) is used.     What are the risks of a skin biopsy?  I will experience scar, bleeding, swelling, pain, crusting and redness. I may experience incomplete removal or recurrence. Risks of this procedure are excessive bleeding, bruising, infection, nerve damage, numbness, thick (hypertrophic or keloidal) scar and non-diagnostic biopsy.    How should I care for my wound for the first 24 hours?  Keep the wound dry and covered for 24 hours  If it bleeds, hold direct pressure on the area for 15 minutes. If bleeding does not stop then go to the emergency room  Avoid strenuous exercise the first 1-2 days or as your doctor instructs you    How should I care for the wound after 24 hours?  After 24 hours, remove the bandage  You may bathe or shower as normal  If you had a scalp biopsy, you can shampoo as usual and can use shower water to clean the biopsy site daily  Clean the wound twice a day with gentle soap and water  Do not scrub, be gentle  Apply white petroleum/Vaseline after cleaning the wound with a cotton swab or a clean finger, and keep the site covered with a Bandaid /bandage. Bandages are not necessary with a scalp biopsy  If you are unable to cover the site with a Bandaid /bandage, re-apply ointment 2-3 times a day to keep the site moist. Moisture will help with healing  Avoid strenuous activity for first 1-2 days  Avoid lakes, rivers, pools, and oceans until the stitches are removed or the site is healed    How do I clean my wound?  Wash hands thoroughly with soap or use hand  before all wound care  Clean the  wound with gentle soap and water  Apply white petroleum/Vaseline  to wound after it is clean  Replace the Bandaid /bandage to keep the wound covered for the first few days or as instructed by your doctor  If you had a scalp biopsy, warm shower water to the area on a daily basis should suffice    What should I use to clean my wound?   Cotton-tipped applicators (Qtips )  White petroleum jelly (Vaseline ). Use a clean new container and use Q-tips to apply.  Bandaids   as needed  Gentle soap     How should I care for my wound long term?  Do not get your wound dirty  Keep up with wound care for one week or until the area is healed.  A small scab will form and fall off by itself when the area is completely healed. The area will be red and will become pink in color as it heals. Sun protection is very important for how your scar will turn out. Sunscreen with an SPF 30 or greater is recommended once the area is healed.  You should have some soreness but it should be mild and slowly go away over several days. Talk to your doctor about using tylenol for pain,    When should I call my doctor?  If you have increased:   Pain or swelling  Pus or drainage (clear or slightly yellow drainage is ok)  Temperature over 100F  Spreading redness or warmth around wound    When will I hear about my results?  The biopsy results can take 2 weeks to come back.  Your results will automatically release to [employer] before your provider has even reviewed them.  The clinic will call you with the results, send you a [employer] message, or have you schedule a follow-up clinic or phone time to discuss the results.  Contact our clinics if you do not hear from us in 2 weeks.    Who should I call with questions?  [facility]: [phone]  [facility]: [phone]  For urgent needs outside of business hours call the [facility] at [phone] and ask for the dermatology resident on call        Patient Education     Checking for Skin Cancer  You can find cancer early by checking your skin each month. There are 3 kinds of skin cancer. They are melanoma, basal cell carcinoma, and squamous cell carcinoma. Doing monthly skin checks is the best way to find new marks or skin changes. Follow the instructions below for checking your skin.   The ABCDEs of checking moles for melanoma   Check your moles or growths for signs of melanoma using ABCDE:   Asymmetry: the sides of the mole or growth don t match  Border: the edges are ragged, notched, or blurred  Color: the color within the mole or growth varies  Diameter: the mole or growth is larger than 6 mm (size of a pencil eraser)  Evolving: the size, shape, or color of the mole or growth is changing (evolving is not shown in the images below)    Checking for other types of skin cancer  Basal cell carcinoma or squamous cell carcinoma have symptoms such as:     A spot or mole that looks different from all other marks on your skin  Changes in how an area feels, such as itching, tenderness, or pain  Changes in the skin's surface, such as oozing, bleeding, or scaliness  A sore that does not heal  New swelling or redness beyond the border of a mole    Who s at risk?  Anyone can get skin cancer. But you are at greater risk if you have:   Fair skin, light-colored hair, or light-colored eyes  Many moles or abnormal moles on your skin  A history of sunburns from sunlight or tanning beds  A family history of skin cancer  A history of exposure to radiation or chemicals  A weakened immune system  If you have had skin cancer in the past, you are at risk for recurring skin cancer.   How to check your skin  Do your monthly skin checkups in front of a full-length mirror. Check all parts of your body, including your:   Head (ears, face, neck, and scalp)  Torso (front, back, and sides)  Arms (tops, undersides, upper, and lower armpits)  Hands (palms, backs, and fingers, including  under the nails)  Buttocks and genitals  Legs (front, back, and sides)  Feet (tops, soles, toes, including under the nails, and between toes)  If you have a lot of moles, take digital photos of them each month. Make sure to take photos both up close and from a distance. These can help you see if any moles change over time.   Most skin changes are not cancer. But if you see any changes in your skin, call your doctor right away. Only he or she can diagnose a problem. If you have skin cancer, seeing your doctor can be the first step toward getting the treatment that could save your life.   [employer] last reviewed this educational content on 4/1/2019 2000-2020 [employer]. [address] [place] [address], [place] [address]. All rights reserved. This information is not intended as a substitute for professional medical care. Always follow your healthcare professional's instructions.       When should I call my doctor?  If you are worsening or not improving, please, contact us or seek urgent care as noted below.     Who should I call with questions (adults)?  [facility] (adult and pediatric): [phone]  [facility] (adult): [phone]  For urgent needs outside of business hours call the [facility] at [phone] and ask for the dermatology resident on call to be paged  If this is a medical emergency and you are unable to reach an ER, Call [phone]    Who should I call with questions (pediatric)?  [facility]- Pediatric Dermatology  Dr. Carmelina Varner, Dr. Juanito Reynoso, Dr. Debby Devine, JESSE Ventura, Dr. Jenifer Sanchez, Dr. Nicolle Bella & Dr. Héctor Henry  Non-urgent nurse triage line; [phone]- Moriah and Tiffani WARD Care Coordinatoryoana Barrow (/Complex ) [phone]    If you need a prescription refill, please contact your pharmacy. Refills are approved or denied by our  Physicians during normal business hours, Monday through Fridays  Per office policy, refills will not be granted if you have not been seen within the past year (or sooner depending on your child's condition)    Scheduling Information:  Pediatric Appointment Scheduling and Call Center [phone]  Radiology Scheduling- [phone]  Sedation Unit Scheduling- [phone]  [place] Scheduling- General [phone]; Pediatric Dermatology [phone]  Main  Services: [phone]  Irish: [phone]  Egyptian: [phone]  Hmong/Palestinian/Faroese: [phone]  Preadmission Nursing Department Fax Number: [phone] (Fax all pre-operative paperwork to this number)    For urgent matters arising during evenings, weekends, or holidays that cannot wait for normal business hours please call [phone] and ask for the dermatology resident on call to be paged.

## 2022-09-06 NOTE — NURSING NOTE
Dermatology Rooming Note    Nicci Pemberton's goals for this visit include:   Chief Complaint   Patient presents with     Derm Problem     Nicci is here today for a skin check. States she sent photos of her concerns. Nicci has spots on her L lower leg and L inner thigh, R lower leg and R thigh by surgical scar. Has concerns about scar on R knee as it is darker than the one on the L. Also reports darker pigmentation on L cheek.

## 2022-09-06 NOTE — LETTER
9/6/2022       RE: Nicci Pemberton  [address] [place] [address]     Dear Colleague,    Thank you for referring your patient, Nicci Pemberton, to the [facility] [place] [facility]. Please see a copy of my visit note below.    [facility] Dermatology Note  Encounter Date: Sep 6, 2022  Office Visit     Dermatology Problem List:  1. History of liver transplant 8/2019 2/2 A1AT.  # NUB, right knee, s/p bx 9/6/2022  # NUB, left upper chest, s/p bx 9/6/2022  ____________________________________________    Assessment & Plan:    # NUB, right knee, biopsy r/o dysplastic nevus  - Shave biopsy today, see procedure below.    # NUB, left upper chest, biopsy r/o dysplastic nevus  - Shave biopsy today, see procedure below.    # Benign lesions: Dermatofibroma, seborrheic keratoses. Discussed the natural history and benign nature of this lesion. Reassurance provided that no additional treatment is necessary.      # Multiple benign nevi. Counseled on ABCDEs of melanoma and sun protection. Asked patient to return sooner if noticing changing or symptomatic lesions.     # History of liver transplant 8/2019. Counseled patient that she is at higher risk for cutaneous malignancy given history of transplant. Monitor for changing or symptomatic lesions. Continue annual skin checks and photoprotection.    Procedures Performed:   - Shave biopsy procedure note, location(s): see above. After discussion of benefits and risks including but not limited to bleeding, infection, scar, incomplete removal, recurrence, and non-diagnostic biopsy, written consent and photographs were obtained. The area was cleaned with isopropyl alcohol. 0.5mL of 1% lidocaine with epinephrine was injected to obtain adequate anesthesia of lesion(s). Shave biopsy at site(s) performed. Hemostasis was achieved with aluminium chloride. Petrolatum ointment and a sterile  dressing were applied. The patient tolerated the procedure and no complications were noted. The patient was provided with verbal and written post care instructions.     Follow-up: 1 year(s) in-person, or earlier pending path.    Staff and Scribe:     Scribe Disclosure:  I, Ama Ferminte, am serving as a scribe to document services personally performed by Marilynn Thomas MD based on data collection and the provider's statements to me.     Provider Disclosure:   The documentation recorded by the scribe accurately reflects the services I personally performed and the decisions made by me.    Marilynn Thomas MD    Department of Dermatology  [facility]: Phone: [phone], Fax: [phone]  9/6/2022     ____________________________________________    CC: Derm Problem (Nicci is here today for a skin check. States she sent photos of her concerns. Nicci has spots on her L lower leg and L inner thigh, R lower leg and R thigh by surgical scar. Has concerns about scar on R knee as it is darker than the one on the L. Also reports darker pigmentation on L cheek.)    HPI:  Ms. Nicci Pemberton is a(n) 62 year old female who presents today as a return patient for FBSE. Last seen by me on 8/27/2020, at which time daily moisturization was advised for treatment of chronic lymphedema and mild stasis dermatitis.    Today, patient reports a few spots of concern that she would like examined. No family history of skin cancer.    Patient is otherwise feeling well, without additional skin concerns.    Labs Reviewed:  N/A    Physical Exam:  Vitals: There were no vitals taken for this visit.  SKIN: Total skin excluding the undergarment areas was performed. The exam included the head/face, neck, both arms, chest, back, abdomen, both legs, digits and/or nails.   - Right knee, dark brown macule.  - Left upper chest, asymmetric dark macule.  -  Multiple regular brown pigmented macules and papules are identified on the trunk and extremities.   - There are waxy stuck on tan to brown papules on the trunk and extremites.   - There is a firm tan/flesh colored papule that dimples with lateral pressure on the right medial thigh.  - No other lesions of concern on areas examined.     Medications:  Current Outpatient Medications   Medication     acetaminophen (TYLENOL) 325 MG tablet     Cholecalciferol (VITAMIN D-3) 25 MCG (1000 UT) CAPS     COMPRESSION STOCKINGS     cycloSPORINE modified (GENERIC EQUIVALENT) 25 MG capsule     famotidine (PEPCID) 20 MG tablet     levothyroxine (SYNTHROID/LEVOTHROID) 125 MCG tablet     mycophenolate (GENERIC EQUIVALENT) 250 MG capsule     No current facility-administered medications for this visit.      Past Medical History:   Patient Active Problem List   Diagnosis     Heterozygous alpha 1-antitrypsin deficiency (H)     Iron overload     Status post liver transplantation (H)     C. difficile diarrhea     Steroid-induced hyperglycemia     Immunosuppressed status (H)     History of liver recipient (H)     Vertigo     Otitis media     Bile duct stricture     Cholangitis     Common bile duct stenosis     Abnormal liver function     Acquired lymphedema of leg     Chronic pain of both knees     Venous hypertension of lower extremity, bilateral     Cramp in lower extremity associated with sleep     Obesity with serious comorbidity     Edema     Hypertension, unspecified type     Gastroesophageal reflux disease without esophagitis     Hyponatremia     Hypothyroidism     Leg swelling     Lymphedema     Macrocytosis without anemia     Malaise     Fatty liver disease, nonalcoholic     Osteoarthritis of both knees, unspecified osteoarthritis type     Osteoarthritis of knee     Peripheral neuropathy     Post-menopausal bleeding     Slow transit constipation     Thrombocytopenia (H)     Vitamin D deficiency     Zinc deficiency     Bilateral edema  of lower extremity     Physical debility     Diverticulitis of colon     Suspected 2019 novel coronavirus infection     Morbid obesity (H)     S/P liver transplant (H)     Liver transplant rejection (H)     Biliary stricture     Chronic kidney disease, stage 3 (H)     Left knee pain     Status post total knee replacement     Past Medical History:   Diagnosis Date     Anemia      Cellulitis      Cirrhosis of liver (H) 06/18/2018     Gastroesophageal reflux disease      Heterozygous alpha 1-antitrypsin deficiency (H)      High hepatic iron concentration determined by biopsy of liver      Liver cirrhosis secondary to ANGULO (H)      Liver transplanted (H) 08/18/2019     Lymphedema      Osteoarthritis     knees     Other chronic pain     Left knee     SBP (spontaneous bacterial peritonitis) (H) 08/02/2019 8/1/19 in [place]     Thrombocytopenia (H) 11/2020     Thyroid disease     Hypothyroid        CC Referred Kenneth, MD  No address on file on close of this encounter.

## 2022-09-06 NOTE — PROGRESS NOTES
[facility] Dermatology Note  Encounter Date: Sep 6, 2022  Office Visit     Dermatology Problem List:  1. History of liver transplant 8/2019 2/2 A1AT.  # NUB, right knee, s/p bx 9/6/2022  # NUB, left upper chest, s/p bx 9/6/2022  ____________________________________________    Assessment & Plan:    # NUB, right knee, biopsy r/o dysplastic nevus  - Shave biopsy today, see procedure below.    # NUB, left upper chest, biopsy r/o dysplastic nevus  - Shave biopsy today, see procedure below.    # Benign lesions: Dermatofibroma, seborrheic keratoses. Discussed the natural history and benign nature of this lesion. Reassurance provided that no additional treatment is necessary.      # Multiple benign nevi. Counseled on ABCDEs of melanoma and sun protection. Asked patient to return sooner if noticing changing or symptomatic lesions.     # History of liver transplant 8/2019. Counseled patient that she is at higher risk for cutaneous malignancy given history of transplant. Monitor for changing or symptomatic lesions. Continue annual skin checks and photoprotection.    Procedures Performed:   - Shave biopsy procedure note, location(s): see above. After discussion of benefits and risks including but not limited to bleeding, infection, scar, incomplete removal, recurrence, and non-diagnostic biopsy, written consent and photographs were obtained. The area was cleaned with isopropyl alcohol. 0.5mL of 1% lidocaine with epinephrine was injected to obtain adequate anesthesia of lesion(s). Shave biopsy at site(s) performed. Hemostasis was achieved with aluminium chloride. Petrolatum ointment and a sterile dressing were applied. The patient tolerated the procedure and no complications were noted. The patient was provided with verbal and written post care instructions.     Follow-up: 1 year(s) in-person, or earlier pending path.    Staff and Scribe:     Scribe Disclosure:  Ama BRAN, am serving as a scribe  to document services personally performed by Marilynn Thomas MD based on data collection and the provider's statements to me.     Provider Disclosure:   The documentation recorded by the scribe accurately reflects the services I personally performed and the decisions made by me.    Marilynn Thomas MD    Department of Dermatology  [facility]: Phone: [phone], Fax: [phone]  9/6/2022     ____________________________________________    CC: Derm Problem (Nicci is here today for a skin check. States she sent photos of her concerns. Nicci has spots on her L lower leg and L inner thigh, R lower leg and R thigh by surgical scar. Has concerns about scar on R knee as it is darker than the one on the L. Also reports darker pigmentation on L cheek.)    HPI:  Ms. Nicci Pemberton is a(n) 62 year old female who presents today as a return patient for FBSE. Last seen by me on 8/27/2020, at which time daily moisturization was advised for treatment of chronic lymphedema and mild stasis dermatitis.    Today, patient reports a few spots of concern that she would like examined. No family history of skin cancer.    Patient is otherwise feeling well, without additional skin concerns.    Labs Reviewed:  N/A    Physical Exam:  Vitals: There were no vitals taken for this visit.  SKIN: Total skin excluding the undergarment areas was performed. The exam included the head/face, neck, both arms, chest, back, abdomen, both legs, digits and/or nails.   - Right knee, dark brown macule.  - Left upper chest, asymmetric dark macule.  - Multiple regular brown pigmented macules and papules are identified on the trunk and extremities.   - There are waxy stuck on tan to brown papules on the trunk and extremites.   - There is a firm tan/flesh colored papule that dimples with lateral pressure on the right medial thigh.  - No other lesions of concern on  areas examined.     Medications:  Current Outpatient Medications   Medication     acetaminophen (TYLENOL) 325 MG tablet     Cholecalciferol (VITAMIN D-3) 25 MCG (1000 UT) CAPS     COMPRESSION STOCKINGS     cycloSPORINE modified (GENERIC EQUIVALENT) 25 MG capsule     famotidine (PEPCID) 20 MG tablet     levothyroxine (SYNTHROID/LEVOTHROID) 125 MCG tablet     mycophenolate (GENERIC EQUIVALENT) 250 MG capsule     No current facility-administered medications for this visit.      Past Medical History:   Patient Active Problem List   Diagnosis     Heterozygous alpha 1-antitrypsin deficiency (H)     Iron overload     Status post liver transplantation (H)     C. difficile diarrhea     Steroid-induced hyperglycemia     Immunosuppressed status (H)     History of liver recipient (H)     Vertigo     Otitis media     Bile duct stricture     Cholangitis     Common bile duct stenosis     Abnormal liver function     Acquired lymphedema of leg     Chronic pain of both knees     Venous hypertension of lower extremity, bilateral     Cramp in lower extremity associated with sleep     Obesity with serious comorbidity     Edema     Hypertension, unspecified type     Gastroesophageal reflux disease without esophagitis     Hyponatremia     Hypothyroidism     Leg swelling     Lymphedema     Macrocytosis without anemia     Malaise     Fatty liver disease, nonalcoholic     Osteoarthritis of both knees, unspecified osteoarthritis type     Osteoarthritis of knee     Peripheral neuropathy     Post-menopausal bleeding     Slow transit constipation     Thrombocytopenia (H)     Vitamin D deficiency     Zinc deficiency     Bilateral edema of lower extremity     Physical debility     Diverticulitis of colon     Suspected 2019 novel coronavirus infection     Morbid obesity (H)     S/P liver transplant (H)     Liver transplant rejection (H)     Biliary stricture     Chronic kidney disease, stage 3 (H)     Left knee pain     Status post total knee  replacement     Past Medical History:   Diagnosis Date     Anemia      Cellulitis      Cirrhosis of liver (H) 06/18/2018     Gastroesophageal reflux disease      Heterozygous alpha 1-antitrypsin deficiency (H)      High hepatic iron concentration determined by biopsy of liver      Liver cirrhosis secondary to ANGULO (H)      Liver transplanted (H) 08/18/2019     Lymphedema      Osteoarthritis     knees     Other chronic pain     Left knee     SBP (spontaneous bacterial peritonitis) (H) 08/02/2019 8/1/19 in [place]     Thrombocytopenia (H) 11/2020     Thyroid disease     Hypothyroid        CC Referred Self, MD  No address on file on close of this encounter.

## 2022-09-07 LAB
CYCLOSPORINE BLD LC/MS/MS-MCNC: 110 UG/L (ref 50–400)
TME LAST DOSE: NORMAL H
TME LAST DOSE: NORMAL H

## 2022-09-08 LAB
PATH REPORT.COMMENTS IMP SPEC: NORMAL
PATH REPORT.COMMENTS IMP SPEC: NORMAL
PATH REPORT.FINAL DX SPEC: NORMAL
PATH REPORT.GROSS SPEC: NORMAL
PATH REPORT.MICROSCOPIC SPEC OTHER STN: NORMAL
PATH REPORT.RELEVANT HX SPEC: NORMAL

## 2022-09-10 ENCOUNTER — PATIENT OUTREACH (OUTPATIENT)
Dept: DERMATOLOGY | Facility: CLINIC | Age: 62
End: 2022-09-10

## 2022-09-15 ENCOUNTER — OFFICE VISIT (OUTPATIENT)
Dept: ORTHOPEDICS | Facility: CLINIC | Age: 62
End: 2022-09-15
Payer: COMMERCIAL

## 2022-09-15 ENCOUNTER — ANCILLARY PROCEDURE (OUTPATIENT)
Dept: MAMMOGRAPHY | Facility: CLINIC | Age: 62
End: 2022-09-15
Attending: FAMILY MEDICINE
Payer: COMMERCIAL

## 2022-09-15 ENCOUNTER — LAB (OUTPATIENT)
Dept: LAB | Facility: CLINIC | Age: 62
End: 2022-09-15
Payer: COMMERCIAL

## 2022-09-15 ENCOUNTER — ANCILLARY PROCEDURE (OUTPATIENT)
Dept: GENERAL RADIOLOGY | Facility: CLINIC | Age: 62
End: 2022-09-15
Attending: ORTHOPAEDIC SURGERY
Payer: COMMERCIAL

## 2022-09-15 VITALS — HEIGHT: 62 IN | WEIGHT: 230 LBS | BODY MASS INDEX: 42.33 KG/M2

## 2022-09-15 DIAGNOSIS — Z96.653 STATUS POST TOTAL BILATERAL KNEE REPLACEMENT: Primary | ICD-10-CM

## 2022-09-15 DIAGNOSIS — Z12.31 VISIT FOR SCREENING MAMMOGRAM: ICD-10-CM

## 2022-09-15 DIAGNOSIS — Z87.81 HX OF FRACTURE OF FEMUR: ICD-10-CM

## 2022-09-15 DIAGNOSIS — Z94.4 LIVER REPLACED BY TRANSPLANT (H): ICD-10-CM

## 2022-09-15 DIAGNOSIS — G89.29 CHRONIC PAIN OF LEFT KNEE: ICD-10-CM

## 2022-09-15 DIAGNOSIS — M25.562 CHRONIC PAIN OF LEFT KNEE: ICD-10-CM

## 2022-09-15 LAB
ALBUMIN SERPL BCG-MCNC: 4.3 G/DL (ref 3.5–5.2)
ALP SERPL-CCNC: 115 U/L (ref 35–104)
ALT SERPL W P-5'-P-CCNC: 13 U/L (ref 10–35)
AST SERPL W P-5'-P-CCNC: 27 U/L (ref 10–35)
BILIRUB DIRECT SERPL-MCNC: 0.34 MG/DL (ref 0–0.3)
BILIRUB SERPL-MCNC: 1.7 MG/DL
ERYTHROCYTE [DISTWIDTH] IN BLOOD BY AUTOMATED COUNT: 12.1 % (ref 10–15)
HCT VFR BLD AUTO: 42.6 % (ref 35–47)
HGB BLD-MCNC: 14.1 G/DL (ref 11.7–15.7)
MCH RBC QN AUTO: 32.5 PG (ref 26.5–33)
MCHC RBC AUTO-ENTMCNC: 33.1 G/DL (ref 31.5–36.5)
MCV RBC AUTO: 98 FL (ref 78–100)
PLATELET # BLD AUTO: 172 10E3/UL (ref 150–450)
PROT SERPL-MCNC: 7.7 G/DL (ref 6.4–8.3)
RBC # BLD AUTO: 4.34 10E6/UL (ref 3.8–5.2)
WBC # BLD AUTO: 7.7 10E3/UL (ref 4–11)

## 2022-09-15 PROCEDURE — 36415 COLL VENOUS BLD VENIPUNCTURE: CPT | Performed by: PATHOLOGY

## 2022-09-15 PROCEDURE — 80076 HEPATIC FUNCTION PANEL: CPT | Performed by: PATHOLOGY

## 2022-09-15 PROCEDURE — 99212 OFFICE O/P EST SF 10 MIN: CPT | Performed by: ORTHOPAEDIC SURGERY

## 2022-09-15 PROCEDURE — 85027 COMPLETE CBC AUTOMATED: CPT | Performed by: PATHOLOGY

## 2022-09-15 PROCEDURE — 77063 BREAST TOMOSYNTHESIS BI: CPT | Mod: GC

## 2022-09-15 PROCEDURE — 73560 X-RAY EXAM OF KNEE 1 OR 2: CPT | Mod: LT | Performed by: RADIOLOGY

## 2022-09-15 PROCEDURE — 77067 SCR MAMMO BI INCL CAD: CPT | Mod: GC

## 2022-09-15 ASSESSMENT — ACTIVITIES OF DAILY LIVING (ADL): FUNCTION,_DAILY_LIVING_SCORE: 58.82

## 2022-09-15 ASSESSMENT — KOOS JR: HOW SEVERE IS YOUR KNEE STIFFNESS AFTER FIRST WAKING IN MORNING: MODERATE

## 2022-09-15 NOTE — PROGRESS NOTES
[facility]  Orthopaedic Surgery, Joint Replacement Follow up  by Mario Wallace M.D.     Nicci Pemberton MRN# [id number]   Age: 61 year old YOB: 1960     Requesting physician: No ref. provider found  Wale Thurston         Background history:  Diagnosis:   1. Degenerative joint disease bilateral knees  2. Status post liver transplantation     Surgery:   1. 10/23/2020 right total knee replacement  2. 9/24/2021, Left TKA (Rodrigo) [facility]  3. 11/10/2021, L knee incision I and D (Rodrigo) [facility].Enterococcus faecalis, Citrobacter and strep mitis, Strep Oralis, peptoniphilus asaccharolyticus, staphylococcus epidermidis. Keflex x 2 days switched to: Linezolid 600 mg p.o. twice daily x10 days, Vantin 200 mg p.o. twice daily x10 days. Then Vantin 200mg PO BID every day x 10days, linezolid 600mg PO BID daily x 10 days.        Nicci is seen for check 1 year after undergoing left knee arthroplasty.  She also had previous right knee arthroplasty 2 years ago.  She is doing well and is happy with her decision to proceed with knee arthroplasty surgeries.  However has not obtained her complete set of goals.  She would like to be more competent negotiating stairs and physically active.  She also has some shoulder issues and posterior back issues.    Examination:  Her gait is normal.  No limp.  Symmetric range of motion of both hips and both knees.  Specifically right knee has full extension and further flexion to 90 degrees.  No effusion.  Ligaments are stable in full extension mid flexion and maximal flexion.    Left knee shows 0 to 9 degrees flexion, no effusion.  Stable ligaments and full extension, mid flexion, and maximum flexion.    Radiographs obtained of the left knee demonstrate satisfactory position.  Slight varus tibial component position overall alignment, acceptable.  No complications.    Impression and plan:  Satisfactory outcome after undergoing left and right total knee arthroplasty.  PROM forms  completed today.  Return to check in 5 years or sooner as needed.  Counseled patient on exercise therapy.  She requested usage of a cycle.  I suggested a stationary bicycle maintaining her heart rate at 90% of maximum over 20 minutes 3 times per week minimum.    Mario Wallace MD [facility] Professor  Oncology and Adult Reconstructive Surgery  Dept Orthopaedic Surgery, [facility]  [phone] office, [phone] pager  [email or web address]    Total combined visit time and work time before and after clinic visit = 20 min

## 2022-09-15 NOTE — NURSING NOTE
"Chief Complaint   Patient presents with     RECHECK     Follow up 1 year TKA.      Surgical Followup     DOS 11/10/21 DOS Irrigation and debridement Left knee skin subcutaneous tissue (length: 10cm), Application of wound vac     DOS 9/24/21 S/P Left total knee arthroplasty         62 year old  1960    Ht 1.575 m (5' 2\")   Wt 104.3 kg (230 lb)   BMI 42.07 kg/m                              Pain Assessment  Patient Currently in Pain: Ottoniel               [id number] - [place], [place] [facility] [address] [facility] [place], [place] - [address]  [facility] - [place], [place] [address] [facility] [address] [place] [address] [address] [place] [address] PHARMACY [place], [place] - [address] [facility] [place], [place] - [address] [place] [address]        Allergies   Allergen Reactions     Ciprofloxacin Dizziness and Nausea     Other reaction(s): Dizziness     Furosemide Rash and Swelling     Other reaction(s): rash  8/14/14  Other reaction(s): rash  8/14/14     Cefpodoxime Other (See Comments)     Food      Fruits with cores and pits  Apples     Linezolid Other (See Comments)     Sulfa Drugs Other (See Comments)     Swollen joints     Sulfamethoxazole-Trimethoprim      Other reaction(s): Unknown  Other reaction(s): Unknown           Current Outpatient Medications   Medication     acetaminophen (TYLENOL) 325 MG tablet     Cholecalciferol (VITAMIN D-3) 25 MCG (1000 UT) CAPS     COMPRESSION STOCKINGS     cycloSPORINE modified (GENERIC EQUIVALENT) 25 MG capsule     famotidine (PEPCID) 20 MG tablet     levothyroxine (SYNTHROID/LEVOTHROID) 125 MCG tablet     mycophenolate (GENERIC EQUIVALENT) 250 MG capsule     No current facility-administered medications for this visit.             Questionnaires:          KOOS Knee Survey Assessment    Knee Outcome Survey ADL Scale (NARCISA Trent; " ALEJANDRO Vazquez; LUCÍA Pollard; Charlie, FH; ASHLEY Tucker; 1998) 9/15/2022   Do you have swelling in your knee? Sometimes   Do you feel grinding, hear clicking or any other type of noise when your knee moves? Rarely   Does your knee catch or hang up when moving? Rarely   Can you straighten your knee fully? Always   Can you bend your knee fully? Always   How severe is your knee joint stiffness after first wakening in the morning? Moderate   How severe is your knee stiffness after sitting, lying or resting LATER IN THE DAY? Moderate   How often do you experience knee pain? Daily   Twisting/pivoting on your knee Moderate   Straightening knee fully None   Bending knee fully None   Walking on flat surface Mild   Going up or down stairs Moderate   At night while in bed None   Sitting or lying None   Standing upright Mild   Descending stairs Severe   Ascending stairs Severe   Rising from sitting Moderate   Standing Mild   Bending to floor/ an object Mild   Walking on flat surface Mild   Getting in/out of car Moderate   Going shopping Moderate   Putting on socks/stockings Moderate   Rising from bed Moderate   Taking off socks/stockings Moderate   Lying in bed (turning over, maintaining knee position) Mild   Getting in/out of bath Moderate   Sitting Mild   Getting on/off toilet Mild   Heavy domestic duties (moving heavy boxes, scrubbing floors, etc) Mild   Light domestic duties (cooking, dusting, etc) Mild   Squatting Mild   Running Severe   Jumping Severe   Twisting/pivoting on your injured knee Severe   Kneeling Extreme   How often are you aware of your knee problem? Daily   Have you modified your life style to avoid potentially damaging activities to your knee? Moderately   How much are you troubled with lack of confidence in your knee? Mildly   In general, how much difficulty do you have with your knee? Moderate   Pain Score 75   Symptoms Score 42.86   Function, Daily Living Score 58.82   Sports and Rec Score 30    Quality of Life Score 50              Promis 10 Assessment    PROMIS 10 9/15/2022   In general, would you say your health is: Good   In general, would you say your quality of life is: Good   In general, how would you rate your physical health? Fair   In general, how would you rate your mental health, including your mood and your ability to think? Very good   In general, how would you rate your satisfaction with your social activities and relationships? Good   In general, please rate how well you carry out your usual social activities and roles Good   To what extent are you able to carry out your everyday physical activities such as walking, climbing stairs, carrying groceries, or moving a chair? Moderately   How often have you been bothered by emotional problems such as feeling anxious, depressed or irritable? Sometimes   How would you rate your fatigue on average? Mild   How would you rate your pain on average?   0 = No Pain  to  10 = Worst Imaginable Pain 3   In general, would you say your health is: 3   In general, would you say your quality of life is: 3   In general, how would you rate your physical health? 2   In general, how would you rate your mental health, including your mood and your ability to think? 4   In general, how would you rate your satisfaction with your social activities and relationships? 3   In general, please rate how well you carry out your usual social activities and roles. (This includes activities at home, at work and in your community, and responsibilities as a parent, child, spouse, employee, friend, etc.) 3   To what extent are you able to carry out your everyday physical activities such as walking, climbing stairs, carrying groceries, or moving a chair? 3   In the past 7 days, how often have you been bothered by emotional problems such as feeling anxious, depressed, or irritable? 3   In the past 7 days, how would you rate your fatigue on average? 2   In the past 7 days, how would  you rate your pain on average, where 0 means no pain, and 10 means worst imaginable pain? 3   Global Mental Health Score 13   Global Physical Health Score 13   PROMIS TOTAL - SUBSCORES 26   Some recent data might be hidden              Or[facility] Knee Questionnaire    No flowsheet data found.

## 2022-09-15 NOTE — LETTER
9/15/2022         RE: Nicci Pemberton  [address] [place] [address]        Dear Colleague,    Thank you for referring your patient, Nicci Pemberton, to the [facility] [place]. Please see a copy of my visit note below.         [facility]  Orthopaedic Surgery, Joint Replacement Follow up  by Mario Wallace M.D.     Nicci Pemberton MRN# [id number]   Age: 61 year old YOB: 1960     Requesting physician: No ref. provider found  Wale Thurston         Background history:  Diagnosis:   1. Degenerative joint disease bilateral knees  2. Status post liver transplantation     Surgery:   1. 10/23/2020 right total knee replacement  2. 9/24/2021, Left TKA (Rodrigo) [facility]  3. 11/10/2021, L knee incision I and D (Rodrigo) [facility].Enterococcus faecalis, Citrobacter and strep mitis, Strep Oralis, peptoniphilus asaccharolyticus, staphylococcus epidermidis. Keflex x 2 days switched to: Linezolid 600 mg p.o. twice daily x10 days, Vantin 200 mg p.o. twice daily x10 days. Then Vantin 200mg PO BID every day x 10days, linezolid 600mg PO BID daily x 10 days.        Nicci is seen for check 1 year after undergoing left knee arthroplasty.  She also had previous right knee arthroplasty 2 years ago.  She is doing well and is happy with her decision to proceed with knee arthroplasty surgeries.  However has not obtained her complete set of goals.  She would like to be more competent negotiating stairs and physically active.  She also has some shoulder issues and posterior back issues.    Examination:  Her gait is normal.  No limp.  Symmetric range of motion of both hips and both knees.  Specifically right knee has full extension and further flexion to 90 degrees.  No effusion.  Ligaments are stable in full extension mid flexion and maximal flexion.    Left knee shows 0 to 9 degrees flexion, no effusion.  Stable ligaments and full extension, mid flexion, and maximum flexion.    Radiographs  obtained of the left knee demonstrate satisfactory position.  Slight varus tibial component position overall alignment, acceptable.  No complications.    Impression and plan:  Satisfactory outcome after undergoing left and right total knee arthroplasty.  PROM forms completed today.  Return to check in 5 years or sooner as needed.  Counseled patient on exercise therapy.  She requested usage of a cycle.  I suggested a stationary bicycle maintaining her heart rate at 90% of maximum over 20 minutes 3 times per week minimum.    Mario Wallace MD  [facility] Family Professor  Oncology and Adult Reconstructive Surgery  Dept Orthopaedic Surgery, [facility]  [phone] office, [phone] pager  [email or web address]    Total combined visit time and work time before and after clinic visit = 20 min

## 2022-09-18 ENCOUNTER — HEALTH MAINTENANCE LETTER (OUTPATIENT)
Age: 62
End: 2022-09-18

## 2022-10-06 ENCOUNTER — LAB (OUTPATIENT)
Dept: LAB | Facility: CLINIC | Age: 62
End: 2022-10-06
Payer: COMMERCIAL

## 2022-10-06 ENCOUNTER — ALLIED HEALTH/NURSE VISIT (OUTPATIENT)
Dept: FAMILY MEDICINE | Facility: CLINIC | Age: 62
End: 2022-10-06
Payer: COMMERCIAL

## 2022-10-06 DIAGNOSIS — Z23 NEED FOR PROPHYLACTIC VACCINATION AND INOCULATION AGAINST INFLUENZA: Primary | ICD-10-CM

## 2022-10-06 DIAGNOSIS — Z94.4 LIVER REPLACED BY TRANSPLANT (H): ICD-10-CM

## 2022-10-06 DIAGNOSIS — Z13.220 LIPID SCREENING: ICD-10-CM

## 2022-10-06 LAB
ALBUMIN SERPL BCG-MCNC: 3.9 G/DL (ref 3.5–5.2)
ALP SERPL-CCNC: 104 U/L (ref 35–104)
ALT SERPL W P-5'-P-CCNC: 16 U/L (ref 10–35)
ANION GAP SERPL CALCULATED.3IONS-SCNC: 10 MMOL/L (ref 7–15)
AST SERPL W P-5'-P-CCNC: 24 U/L (ref 10–35)
BILIRUB DIRECT SERPL-MCNC: 0.36 MG/DL (ref 0–0.3)
BILIRUB SERPL-MCNC: 1.8 MG/DL
BUN SERPL-MCNC: 23 MG/DL (ref 8–23)
CALCIUM SERPL-MCNC: 9.1 MG/DL (ref 8.8–10.2)
CHLORIDE SERPL-SCNC: 105 MMOL/L (ref 98–107)
CREAT SERPL-MCNC: 1.11 MG/DL (ref 0.51–0.95)
CYCLOSPORINE BLD LC/MS/MS-MCNC: 154 UG/L (ref 50–400)
DEPRECATED HCO3 PLAS-SCNC: 26 MMOL/L (ref 22–29)
ERYTHROCYTE [DISTWIDTH] IN BLOOD BY AUTOMATED COUNT: 12 % (ref 10–15)
GFR SERPL CREATININE-BSD FRML MDRD: 56 ML/MIN/1.73M2
GLUCOSE SERPL-MCNC: 91 MG/DL (ref 70–99)
HCT VFR BLD AUTO: 38.4 % (ref 35–47)
HGB BLD-MCNC: 13 G/DL (ref 11.7–15.7)
MCH RBC QN AUTO: 33.4 PG (ref 26.5–33)
MCHC RBC AUTO-ENTMCNC: 33.9 G/DL (ref 31.5–36.5)
MCV RBC AUTO: 99 FL (ref 78–100)
PLATELET # BLD AUTO: 140 10E3/UL (ref 150–450)
POTASSIUM SERPL-SCNC: 5 MMOL/L (ref 3.4–5.3)
PROT SERPL-MCNC: 7 G/DL (ref 6.4–8.3)
RBC # BLD AUTO: 3.89 10E6/UL (ref 3.8–5.2)
SODIUM SERPL-SCNC: 141 MMOL/L (ref 136–145)
TME LAST DOSE: NORMAL H
TME LAST DOSE: NORMAL H
WBC # BLD AUTO: 5.6 10E3/UL (ref 4–11)

## 2022-10-06 PROCEDURE — 90682 RIV4 VACC RECOMBINANT DNA IM: CPT

## 2022-10-06 PROCEDURE — 85027 COMPLETE CBC AUTOMATED: CPT

## 2022-10-06 PROCEDURE — 36415 COLL VENOUS BLD VENIPUNCTURE: CPT

## 2022-10-06 PROCEDURE — 99207 PR NO CHARGE NURSE ONLY: CPT

## 2022-10-06 PROCEDURE — 80158 DRUG ASSAY CYCLOSPORINE: CPT

## 2022-10-06 PROCEDURE — 82248 BILIRUBIN DIRECT: CPT

## 2022-10-06 PROCEDURE — 90471 IMMUNIZATION ADMIN: CPT

## 2022-10-06 PROCEDURE — 80053 COMPREHEN METABOLIC PANEL: CPT

## 2022-10-07 ENCOUNTER — TELEPHONE (OUTPATIENT)
Dept: TRANSPLANT | Facility: CLINIC | Age: 62
End: 2022-10-07

## 2022-10-07 DIAGNOSIS — Z94.4 LIVER REPLACED BY TRANSPLANT (H): ICD-10-CM

## 2022-10-07 RX ORDER — CYCLOSPORINE 25 MG/1
CAPSULE, LIQUID FILLED ORAL
Qty: 450 CAPSULE | Refills: 3 | Status: SHIPPED | OUTPATIENT
Start: 2022-10-07 | End: 2023-10-20

## 2022-10-07 NOTE — TELEPHONE ENCOUNTER
ISSUE:   Cyclosporine level 154 on 10/7, goal , dose 75 mg BID.    PLAN:   Please call patient and confirm this was an accurate 12-hour trough. Verify Cyclosporine dose 75 mg BID. Confirm no new medications or illness. Confirm no missed doses. If accurate trough and accurate dose, decrease Cyclosporine dose to 75 mg AM and 50 mg PM and repeat labs in 1 month.  Zina Dunham RN       OUTCOME:   Spoke with patient, they confirm accurate trough level and current dose 75 mg BID. Patient confirmed dose change to 75 mg am, 50 mg pm and to repeat labs in 1 months. Orders sent to preferred pharmacy for dose change and lab for repeat labs. Patient voiced understanding of plan.    Lauren Zuluaga LPN

## 2022-11-04 DIAGNOSIS — Z94.4 LIVER REPLACED BY TRANSPLANT (H): Primary | ICD-10-CM

## 2022-11-07 ENCOUNTER — LAB (OUTPATIENT)
Dept: LAB | Facility: CLINIC | Age: 62
End: 2022-11-07
Payer: COMMERCIAL

## 2022-11-07 DIAGNOSIS — Z13.220 LIPID SCREENING: ICD-10-CM

## 2022-11-07 DIAGNOSIS — Z94.4 LIVER REPLACED BY TRANSPLANT (H): ICD-10-CM

## 2022-11-07 PROBLEM — T81.49XA INCISIONAL INFECTION: Status: ACTIVE | Noted: 2021-11-04

## 2022-11-07 LAB
ALBUMIN SERPL BCG-MCNC: 4.1 G/DL (ref 3.5–5.2)
ALP SERPL-CCNC: 118 U/L (ref 35–104)
ALT SERPL W P-5'-P-CCNC: 18 U/L (ref 10–35)
ANION GAP SERPL CALCULATED.3IONS-SCNC: 10 MMOL/L (ref 7–15)
AST SERPL W P-5'-P-CCNC: 29 U/L (ref 10–35)
BILIRUB DIRECT SERPL-MCNC: 0.22 MG/DL (ref 0–0.3)
BILIRUB SERPL-MCNC: 1.5 MG/DL
BUN SERPL-MCNC: 25.4 MG/DL (ref 8–23)
CALCIUM SERPL-MCNC: 9.9 MG/DL (ref 8.8–10.2)
CHLORIDE SERPL-SCNC: 106 MMOL/L (ref 98–107)
CREAT SERPL-MCNC: 1.15 MG/DL (ref 0.51–0.95)
CYCLOSPORINE BLD LC/MS/MS-MCNC: 87 UG/L (ref 50–400)
DEPRECATED HCO3 PLAS-SCNC: 27 MMOL/L (ref 22–29)
ERYTHROCYTE [DISTWIDTH] IN BLOOD BY AUTOMATED COUNT: 11.9 % (ref 10–15)
GFR SERPL CREATININE-BSD FRML MDRD: 54 ML/MIN/1.73M2
GLUCOSE SERPL-MCNC: 98 MG/DL (ref 70–99)
HCT VFR BLD AUTO: 40.6 % (ref 35–47)
HGB BLD-MCNC: 13.5 G/DL (ref 11.7–15.7)
MCH RBC QN AUTO: 32.9 PG (ref 26.5–33)
MCHC RBC AUTO-ENTMCNC: 33.3 G/DL (ref 31.5–36.5)
MCV RBC AUTO: 99 FL (ref 78–100)
PLATELET # BLD AUTO: 164 10E3/UL (ref 150–450)
POTASSIUM SERPL-SCNC: 5.1 MMOL/L (ref 3.4–5.3)
PROT SERPL-MCNC: 7.5 G/DL (ref 6.4–8.3)
RBC # BLD AUTO: 4.1 10E6/UL (ref 3.8–5.2)
SODIUM SERPL-SCNC: 143 MMOL/L (ref 136–145)
TME LAST DOSE: NORMAL H
TME LAST DOSE: NORMAL H
WBC # BLD AUTO: 5.9 10E3/UL (ref 4–11)

## 2022-11-07 PROCEDURE — 82248 BILIRUBIN DIRECT: CPT

## 2022-11-07 PROCEDURE — 85027 COMPLETE CBC AUTOMATED: CPT

## 2022-11-07 PROCEDURE — 80053 COMPREHEN METABOLIC PANEL: CPT

## 2022-11-07 PROCEDURE — 80158 DRUG ASSAY CYCLOSPORINE: CPT

## 2022-11-07 PROCEDURE — 36415 COLL VENOUS BLD VENIPUNCTURE: CPT

## 2022-11-14 ENCOUNTER — LAB (OUTPATIENT)
Dept: LAB | Facility: CLINIC | Age: 62
End: 2022-11-14
Payer: COMMERCIAL

## 2022-11-14 DIAGNOSIS — Z94.4 LIVER REPLACED BY TRANSPLANT (H): ICD-10-CM

## 2022-11-14 LAB
ALBUMIN SERPL BCG-MCNC: 4.1 G/DL (ref 3.5–5.2)
ALP SERPL-CCNC: 117 U/L (ref 35–104)
ALT SERPL W P-5'-P-CCNC: 19 U/L (ref 10–35)
AST SERPL W P-5'-P-CCNC: 31 U/L (ref 10–35)
BILIRUB DIRECT SERPL-MCNC: 0.25 MG/DL (ref 0–0.3)
BILIRUB SERPL-MCNC: 1.6 MG/DL
ERYTHROCYTE [DISTWIDTH] IN BLOOD BY AUTOMATED COUNT: 12.1 % (ref 10–15)
HCT VFR BLD AUTO: 39.7 % (ref 35–47)
HGB BLD-MCNC: 13.3 G/DL (ref 11.7–15.7)
MCH RBC QN AUTO: 33.4 PG (ref 26.5–33)
MCHC RBC AUTO-ENTMCNC: 33.5 G/DL (ref 31.5–36.5)
MCV RBC AUTO: 100 FL (ref 78–100)
PLATELET # BLD AUTO: 160 10E3/UL (ref 150–450)
PROT SERPL-MCNC: 7.4 G/DL (ref 6.4–8.3)
RBC # BLD AUTO: 3.98 10E6/UL (ref 3.8–5.2)
WBC # BLD AUTO: 6.7 10E3/UL (ref 4–11)

## 2022-11-14 PROCEDURE — 36415 COLL VENOUS BLD VENIPUNCTURE: CPT

## 2022-11-14 PROCEDURE — 85027 COMPLETE CBC AUTOMATED: CPT

## 2022-11-14 PROCEDURE — 80076 HEPATIC FUNCTION PANEL: CPT

## 2022-11-21 ENCOUNTER — LAB (OUTPATIENT)
Dept: LAB | Facility: CLINIC | Age: 62
End: 2022-11-21
Payer: COMMERCIAL

## 2022-11-21 DIAGNOSIS — Z94.4 LIVER REPLACED BY TRANSPLANT (H): ICD-10-CM

## 2022-11-21 LAB
ALBUMIN SERPL BCG-MCNC: 4.1 G/DL (ref 3.5–5.2)
ALP SERPL-CCNC: 118 U/L (ref 35–104)
ALT SERPL W P-5'-P-CCNC: 17 U/L (ref 10–35)
AST SERPL W P-5'-P-CCNC: 30 U/L (ref 10–35)
BILIRUB DIRECT SERPL-MCNC: 0.29 MG/DL (ref 0–0.3)
BILIRUB SERPL-MCNC: 1.9 MG/DL
ERYTHROCYTE [DISTWIDTH] IN BLOOD BY AUTOMATED COUNT: 12.2 % (ref 10–15)
HCT VFR BLD AUTO: 39.8 % (ref 35–47)
HGB BLD-MCNC: 13.3 G/DL (ref 11.7–15.7)
MCH RBC QN AUTO: 33.4 PG (ref 26.5–33)
MCHC RBC AUTO-ENTMCNC: 33.4 G/DL (ref 31.5–36.5)
MCV RBC AUTO: 100 FL (ref 78–100)
PLATELET # BLD AUTO: 157 10E3/UL (ref 150–450)
PROT SERPL-MCNC: 7.1 G/DL (ref 6.4–8.3)
RBC # BLD AUTO: 3.98 10E6/UL (ref 3.8–5.2)
WBC # BLD AUTO: 6.6 10E3/UL (ref 4–11)

## 2022-11-21 PROCEDURE — 80076 HEPATIC FUNCTION PANEL: CPT

## 2022-11-21 PROCEDURE — 36415 COLL VENOUS BLD VENIPUNCTURE: CPT

## 2022-11-21 PROCEDURE — 85027 COMPLETE CBC AUTOMATED: CPT

## 2022-11-29 ENCOUNTER — TELEPHONE (OUTPATIENT)
Dept: TRANSPLANT | Facility: CLINIC | Age: 62
End: 2022-11-29

## 2022-11-29 NOTE — LETTER
PHYSICIAN ORDERS       [facility]       Fax#[phone]      DATE & TIME ISSUED: 2022 10:25 AM  PATIENT NAME: Nicci Pemberton   : 1960     [facility] MR# [if applicable]: [id number]     DIAGNOSIS:  Liver transplant  ICD-10 CODE: Z94.4     Please order compression stocking for this patient.     Any questions please call: [phone]        Thomas M. Leventhal, M.D.   of Medicine  Advanced & Transplant Hepatology  [facility]

## 2022-11-29 NOTE — TELEPHONE ENCOUNTER
Would like to get a new script for Compression Stocking (Dr. T. Leventhal)   [place], [place] PH#[phone] Fax#[phone] can she take Apple Cider Supplement (Gummies)?     Ok to reply via Mychart/or call

## 2022-11-30 NOTE — TELEPHONE ENCOUNTER
Left a message it is ok to take apple cider supplements and order was faxed to [facility].

## 2022-12-07 ENCOUNTER — LAB (OUTPATIENT)
Dept: LAB | Facility: CLINIC | Age: 62
End: 2022-12-07
Payer: COMMERCIAL

## 2022-12-07 DIAGNOSIS — Z94.4 LIVER REPLACED BY TRANSPLANT (H): ICD-10-CM

## 2022-12-07 DIAGNOSIS — Z13.220 LIPID SCREENING: ICD-10-CM

## 2022-12-07 LAB
ALBUMIN SERPL BCG-MCNC: 4.1 G/DL (ref 3.5–5.2)
ALP SERPL-CCNC: 113 U/L (ref 35–104)
ALT SERPL W P-5'-P-CCNC: 18 U/L (ref 10–35)
ANION GAP SERPL CALCULATED.3IONS-SCNC: 7 MMOL/L (ref 7–15)
AST SERPL W P-5'-P-CCNC: 32 U/L (ref 10–35)
BILIRUB DIRECT SERPL-MCNC: 0.28 MG/DL (ref 0–0.3)
BILIRUB SERPL-MCNC: 1.8 MG/DL
BUN SERPL-MCNC: 24.1 MG/DL (ref 8–23)
CALCIUM SERPL-MCNC: 9.5 MG/DL (ref 8.8–10.2)
CHLORIDE SERPL-SCNC: 105 MMOL/L (ref 98–107)
CREAT SERPL-MCNC: 1.12 MG/DL (ref 0.51–0.95)
DEPRECATED HCO3 PLAS-SCNC: 29 MMOL/L (ref 22–29)
ERYTHROCYTE [DISTWIDTH] IN BLOOD BY AUTOMATED COUNT: 12.1 % (ref 10–15)
GFR SERPL CREATININE-BSD FRML MDRD: 55 ML/MIN/1.73M2
GLUCOSE SERPL-MCNC: 96 MG/DL (ref 70–99)
HCT VFR BLD AUTO: 38.8 % (ref 35–47)
HGB BLD-MCNC: 13 G/DL (ref 11.7–15.7)
MCH RBC QN AUTO: 33.6 PG (ref 26.5–33)
MCHC RBC AUTO-ENTMCNC: 33.5 G/DL (ref 31.5–36.5)
MCV RBC AUTO: 100 FL (ref 78–100)
PLATELET # BLD AUTO: 153 10E3/UL (ref 150–450)
POTASSIUM SERPL-SCNC: 4.3 MMOL/L (ref 3.4–5.3)
PROT SERPL-MCNC: 7.3 G/DL (ref 6.4–8.3)
RBC # BLD AUTO: 3.87 10E6/UL (ref 3.8–5.2)
SODIUM SERPL-SCNC: 141 MMOL/L (ref 136–145)
WBC # BLD AUTO: 5.7 10E3/UL (ref 4–11)

## 2022-12-07 PROCEDURE — 82248 BILIRUBIN DIRECT: CPT

## 2022-12-07 PROCEDURE — 80053 COMPREHEN METABOLIC PANEL: CPT

## 2022-12-07 PROCEDURE — 80158 DRUG ASSAY CYCLOSPORINE: CPT

## 2022-12-07 PROCEDURE — 36415 COLL VENOUS BLD VENIPUNCTURE: CPT

## 2022-12-07 PROCEDURE — 85027 COMPLETE CBC AUTOMATED: CPT

## 2022-12-08 LAB
CYCLOSPORINE BLD LC/MS/MS-MCNC: 79 UG/L (ref 50–400)
TME LAST DOSE: NORMAL H
TME LAST DOSE: NORMAL H

## 2023-01-31 ENCOUNTER — MEDICAL CORRESPONDENCE (OUTPATIENT)
Dept: HEALTH INFORMATION MANAGEMENT | Facility: CLINIC | Age: 63
End: 2023-01-31
Payer: COMMERCIAL

## 2023-01-31 DIAGNOSIS — Z94.4 LIVER REPLACED BY TRANSPLANT (H): Primary | ICD-10-CM

## 2023-02-01 DIAGNOSIS — E03.9 PRIMARY HYPOTHYROIDISM: Primary | ICD-10-CM

## 2023-02-06 ENCOUNTER — LAB (OUTPATIENT)
Dept: LAB | Facility: CLINIC | Age: 63
End: 2023-02-06
Payer: COMMERCIAL

## 2023-02-06 DIAGNOSIS — E03.9 PRIMARY HYPOTHYROIDISM: ICD-10-CM

## 2023-02-06 DIAGNOSIS — Z94.4 LIVER REPLACED BY TRANSPLANT (H): ICD-10-CM

## 2023-02-06 LAB
ALBUMIN MFR UR ELPH: 9.1 MG/DL (ref 1–14)
ALBUMIN SERPL BCG-MCNC: 3.9 G/DL (ref 3.5–5.2)
ALBUMIN UR-MCNC: NEGATIVE MG/DL
ALP SERPL-CCNC: 112 U/L (ref 35–104)
ALT SERPL W P-5'-P-CCNC: 22 U/L (ref 10–35)
ANION GAP SERPL CALCULATED.3IONS-SCNC: 11 MMOL/L (ref 7–15)
APPEARANCE UR: CLEAR
AST SERPL W P-5'-P-CCNC: 32 U/L (ref 10–35)
BACTERIA #/AREA URNS HPF: ABNORMAL /HPF
BILIRUB DIRECT SERPL-MCNC: 0.27 MG/DL (ref 0–0.3)
BILIRUB SERPL-MCNC: 1.5 MG/DL
BILIRUB UR QL STRIP: NEGATIVE
BUN SERPL-MCNC: 22.3 MG/DL (ref 8–23)
CALCIUM SERPL-MCNC: 9.8 MG/DL (ref 8.8–10.2)
CHLORIDE SERPL-SCNC: 103 MMOL/L (ref 98–107)
CHOLEST SERPL-MCNC: 204 MG/DL
COLOR UR AUTO: YELLOW
CREAT SERPL-MCNC: 1.11 MG/DL (ref 0.51–0.95)
CREAT UR-MCNC: 134.7 MG/DL
CYCLOSPORINE BLD LC/MS/MS-MCNC: 104 UG/L (ref 50–400)
DEPRECATED HCO3 PLAS-SCNC: 27 MMOL/L (ref 22–29)
ERYTHROCYTE [DISTWIDTH] IN BLOOD BY AUTOMATED COUNT: 11.8 % (ref 10–15)
GFR SERPL CREATININE-BSD FRML MDRD: 56 ML/MIN/1.73M2
GLUCOSE SERPL-MCNC: 96 MG/DL (ref 70–99)
GLUCOSE UR STRIP-MCNC: NEGATIVE MG/DL
HCT VFR BLD AUTO: 40.5 % (ref 35–47)
HDLC SERPL-MCNC: 52 MG/DL
HGB BLD-MCNC: 13.7 G/DL (ref 11.7–15.7)
HGB UR QL STRIP: ABNORMAL
KETONES UR STRIP-MCNC: NEGATIVE MG/DL
LDLC SERPL CALC-MCNC: 121 MG/DL
LEUKOCYTE ESTERASE UR QL STRIP: NEGATIVE
MAGNESIUM SERPL-MCNC: 1.6 MG/DL (ref 1.7–2.3)
MCH RBC QN AUTO: 33.4 PG (ref 26.5–33)
MCHC RBC AUTO-ENTMCNC: 33.8 G/DL (ref 31.5–36.5)
MCV RBC AUTO: 99 FL (ref 78–100)
NITRATE UR QL: NEGATIVE
NONHDLC SERPL-MCNC: 152 MG/DL
PH UR STRIP: 5 [PH] (ref 5–7)
PLATELET # BLD AUTO: 159 10E3/UL (ref 150–450)
POTASSIUM SERPL-SCNC: 4.5 MMOL/L (ref 3.4–5.3)
PROT SERPL-MCNC: 7.4 G/DL (ref 6.4–8.3)
PROT/CREAT 24H UR: 0.07 MG/MG CR (ref 0–0.2)
RBC # BLD AUTO: 4.1 10E6/UL (ref 3.8–5.2)
RBC #/AREA URNS AUTO: ABNORMAL /HPF
SODIUM SERPL-SCNC: 141 MMOL/L (ref 136–145)
SP GR UR STRIP: 1.02 (ref 1–1.03)
SQUAMOUS #/AREA URNS AUTO: ABNORMAL /LPF
TME LAST DOSE: NORMAL H
TME LAST DOSE: NORMAL H
TRIGL SERPL-MCNC: 154 MG/DL
TSH SERPL DL<=0.005 MIU/L-ACNC: 1.46 UIU/ML (ref 0.3–4.2)
UROBILINOGEN UR STRIP-ACNC: 0.2 E.U./DL
WBC # BLD AUTO: 5.7 10E3/UL (ref 4–11)
WBC #/AREA URNS AUTO: ABNORMAL /HPF

## 2023-02-06 PROCEDURE — 85027 COMPLETE CBC AUTOMATED: CPT

## 2023-02-06 PROCEDURE — 84156 ASSAY OF PROTEIN URINE: CPT

## 2023-02-06 PROCEDURE — 36415 COLL VENOUS BLD VENIPUNCTURE: CPT

## 2023-02-06 PROCEDURE — 82248 BILIRUBIN DIRECT: CPT

## 2023-02-06 PROCEDURE — 80061 LIPID PANEL: CPT

## 2023-02-06 PROCEDURE — 80053 COMPREHEN METABOLIC PANEL: CPT

## 2023-02-06 PROCEDURE — 83735 ASSAY OF MAGNESIUM: CPT

## 2023-02-06 PROCEDURE — 81001 URINALYSIS AUTO W/SCOPE: CPT

## 2023-02-06 PROCEDURE — 84443 ASSAY THYROID STIM HORMONE: CPT

## 2023-02-06 PROCEDURE — 80158 DRUG ASSAY CYCLOSPORINE: CPT

## 2023-02-16 ENCOUNTER — OFFICE VISIT (OUTPATIENT)
Dept: GASTROENTEROLOGY | Facility: CLINIC | Age: 63
End: 2023-02-16
Attending: INTERNAL MEDICINE
Payer: COMMERCIAL

## 2023-02-16 VITALS
DIASTOLIC BLOOD PRESSURE: 87 MMHG | HEART RATE: 94 BPM | BODY MASS INDEX: 45.91 KG/M2 | SYSTOLIC BLOOD PRESSURE: 138 MMHG | WEIGHT: 251 LBS | OXYGEN SATURATION: 97 %

## 2023-02-16 DIAGNOSIS — N18.30 STAGE 3 CHRONIC KIDNEY DISEASE, UNSPECIFIED WHETHER STAGE 3A OR 3B CKD (H): ICD-10-CM

## 2023-02-16 DIAGNOSIS — Z48.298 CARE AFTER ORGAN TRANSPLANT: ICD-10-CM

## 2023-02-16 DIAGNOSIS — Z94.4 LIVER REPLACED BY TRANSPLANT (H): Primary | ICD-10-CM

## 2023-02-16 DIAGNOSIS — K76.0 FATTY LIVER: ICD-10-CM

## 2023-02-16 DIAGNOSIS — E66.01 MORBID OBESITY (H): ICD-10-CM

## 2023-02-16 DIAGNOSIS — D84.9 IMMUNOSUPPRESSED STATUS (H): ICD-10-CM

## 2023-02-16 PROCEDURE — 99215 OFFICE O/P EST HI 40 MIN: CPT | Performed by: INTERNAL MEDICINE

## 2023-02-16 PROCEDURE — G0463 HOSPITAL OUTPT CLINIC VISIT: HCPCS | Performed by: INTERNAL MEDICINE

## 2023-02-16 NOTE — LETTER
2023         RE: Nicci Pemberton  [address] [place] [address]        Dear Colleague,    Thank you for referring your patient, Nicci Pemberton, to the [facility] [place]. Please see a copy of my visit note below.    Date of Service: 2023     Subjective:          Nicci Pemberton is a 62 year old female presenting for follow up with history of liver transplantation    History of Present Illness   Nicci Pemberton is a 59 year old female with past medical history of obesity, lymphedema, and cirrhosis secondary to combination of nonalcoholic fatty liver disease, iron overload, and alpha-1 antitrypsin MZ phenotype who is s/p  donor liver transplant on 19 and presents in follow up.  Noted that she had an episode of biopsy-proven moderate ACR in 3/2021.     Since last seen she reports that her biggest issue has been ongoing persistent weight loss since the time of transplant.  Noted that she is gained approximately 50 pounds since surgical intervention.  Very clearly understands how this relates to her diet and lifestyle.  Notes that she has had extensive discussions with her  and in the past 2 weeks she they have agreed upon making a very focused and dedicated efforts to making lifestyle modifications that will improve both of their overall health status.  She reports that they are working hard to watch and minimize carbohydrate intake, watch overall caloric intake, and are making plans for increasing physical activity.      She has not had any hospitalizations over the past year    She continues on immunosuppression with cyclosporine 75 mg twice daily and  mg twice daily as she had an episode of moderate acute cellular rejection (MARVIN 5/9) noted on biopsy from 2021.      Surgical Course  - OLT date: 2019   - etiology: ANGULO/alpha 1  - donor: type DBD  - Induction IS: basiliximab  - CMV status D+/R+  - operation: Surgical  technique caval replacement, biliary anastomosis: duct to duct  - CiT 335min, WiT 35min  - Episodes of Rejection:  moderate acute cellular rejection (MARVIN 5/9) noted on biopsy from March 25, 2021  - Biliary complications: high grade stricture s/p multiple ERCPs (last 5/2021)  - Other complications of immediate post-operative course: delerium with d/c of tacrolimus and start of cyclosporin    Past Medical History:  Past Medical History:   Diagnosis Date     Anemia      Cellulitis      Cirrhosis of liver (H) 06/18/2018     Gastroesophageal reflux disease      Heterozygous alpha 1-antitrypsin deficiency (H)      High hepatic iron concentration determined by biopsy of liver      Incisional infection 11/4/2021    Left TKA incision     Liver cirrhosis secondary to ANGULO (H)      Liver transplanted (H) 08/18/2019     Lymphedema      Osteoarthritis     knees     Other chronic pain     Left knee     SBP (spontaneous bacterial peritonitis) (H) 08/02/2019 8/1/19 in [facility]     Thrombocytopenia (H) 11/2020     Thyroid disease     Hypothyroid       Past Surgical History:  Past Surgical History:   Procedure Laterality Date     ARTHROPLASTY KNEE Right 10/23/2020    Procedure: Right  total knee arthroplasty;  Surgeon: Mario Wallace MD;  [facility]     ARTHROPLASTY KNEE Left 9/24/2021    Procedure: Left total knee arthroplasty;  Surgeon: Mario Wallace MD;  [facility]     BENCH LIVER N/A 8/18/2019    Procedure: BACKBENCH PREPARATION, LIVER;  Surgeon: Mandeep Alford MD;  [facility]     COLONOSCOPY N/A 6/22/2018    Procedure: COLONOSCOPY;  colonoscopy;  Surgeon: Hugo Patel MD;  [facility]     ENDOSCOPIC RETROGRADE CHOLANGIOPANCREATOGRAM N/A 2/10/2020    Procedure: ENDOSCOPIC RETROGRADE CHOLANGIOPANCREATOGRAPHY with spinchterotomy;  Surgeon: Guru Rigoberto Canada MD;  [facility]     ENDOSCOPIC RETROGRADE CHOLANGIOPANCREATOGRAM N/A 5/13/2020    Procedure: ENDOSCOPIC RETROGRADE  CHOLANGIOPANCREATOGRAPHY,Stent exchange and sludge removal;  Surgeon: Guru Rigoberto Canada MD;  [facility]     ENDOSCOPIC RETROGRADE CHOLANGIOPANCREATOGRAM N/A 2021    Procedure: ENDOSCOPIC RETROGRADE CHOLANGIOPANCREATOGRAPHY, SPINCTEROTOMY, DEBRIDE REMOVAL, STENT PLACEMENT;  Surgeon: Guru Rigoberto Canada MD;  [facility]     ENDOSCOPIC RETROGRADE CHOLANGIOPANCREATOGRAPHY, EXCHANGE TUBE/STENT N/A 3/4/2020    Procedure: ENDOSCOPIC RETROGRADE CHOLANGIOPANCREATOGRAPHY, WITH biliary dilarion, stent exchange, stone removal;  Surgeon: Guru Rigoberto Canada MD;  [facility]     ENDOSCOPIC RETROGRADE CHOLANGIOPANCREATOGRAPHY, EXCHANGE TUBE/STENT N/A 2021    Procedure: ENDOSCOPIC RETROGRADE CHOLANGIOPANCREATOGRAPHY WITH BILIARY STENT EXCHANGE, DILATION AND SLUDGE/STONE REMOVAL;  Surgeon: Guru Rigoberto Canada MD;  [facility]     ESOPHAGOSCOPY, GASTROSCOPY, DUODENOSCOPY (EGD), COMBINED N/A 10/31/2019    Procedure: Esophagogastroduodenoscopy, With Biopsy;  Surgeon: Nerissa Aranda MD;  [facility]     IR FEEDING TUBE PLACEMENT W MOHAN/MD  2019     IR FLUORO 0-1 HOUR  2019     IR LIVER BIOPSY PERCUTANEOUS  3/25/2021     IR LUMBAR PUNCTURE  2019     IRRIGATION AND DEBRIDEMENT LOWER EXTREMITY, COMBINED Left 11/10/2021    Procedure: Irrigation and debridement Left knee skin subcutaneous tissue (length: 10cm), Application of wound vac;  Surgeon: Mario Wallace MD;  [facility]     KNEE SURGERY  10/23/20     NC STOMACH SURGERY PROCEDURE UNLISTED  Liver transplant 19     TRANSPLANT LIVER RECIPIENT  DONOR N/A 2019    Procedure: TRANSPLANT, LIVER, RECIPIENT,  DONOR;  Surgeon: Mandeep Alford MD;  [facility]     US PARACENTESIS  2019     US PARACENTESIS WITH ALBUMIN  2019       Past Social History:  Social History     Tobacco Use     Smoking status: Former      Packs/day: 0.50     Years: 10.00     Pack years: 5.00     Types: Cigarettes     Start date: 2000     Quit date:      Years since quittin.3     Smokeless tobacco: Never   Substance Use Topics     Alcohol use: No     Comment: due to liver transplant,      Drug use: No        Family History:  Family History   Problem Relation Age of Onset     Diabetes Maternal Grandfather         Diagnosed at age 83     Mental Illness Maternal Grandfather      No Known Problems Mother      Restless Leg Syndrome Father      Mental Illness Sister      Alcoholism Brother      No Known Problems Son      Heart Failure Paternal Grandmother      No Known Problems Son      No Known Problems Maternal Grandmother      Breast Cancer Paternal Aunt      Cirrhosis No family hx of      Liver Cancer No family hx of        Review of Systems  A complete 10 point review of systems was asked and answered in the negative unless specifically commented upon in the HPI    Current Outpatient Medications   Medication     acetaminophen (TYLENOL) 325 MG tablet     Cholecalciferol (VITAMIN D-3) 25 MCG (1000 UT) CAPS     COMPRESSION STOCKINGS     cycloSPORINE modified (GENERIC EQUIVALENT) 25 MG capsule     famotidine (PEPCID) 20 MG tablet     levothyroxine (SYNTHROID/LEVOTHROID) 125 MCG tablet     mycophenolate (GENERIC EQUIVALENT) 250 MG capsule     No current facility-administered medications for this visit.       Objective:         Vitals:    23 1136   BP: 138/87   BP Location: Right arm   Patient Position: Sitting   Cuff Size: Adult Large   Pulse: 94   SpO2: 97%   Weight: 113.9 kg (251 lb)     Body mass index is 45.91 kg/m .     Physical Exam    Vitals reviewed.   Constitutional: Well-developed, well-nourished, in no apparent distress.    HEENT: Normocephalic. no scleral icterus.   Neck/Lymph: Normal ROM, supple.  Cardiac:  Regular rate and rhythm.   Respiratory: Normal respiratory excursion   GI:  Abdomen soft, obese  Skin:  Skin is warm and  dry. No rash noted.  Peripheral Vascular: b/l LE edema, 2+ pulses in all extremities  Musculoskeletal:  ROM intact, normal muscle bulk    Psychiatric: Normal mood and affect. Behavior is normal.  Neuro: no tremor, A&Ox3    Labs and Diagnostic tests:  CBC w/Diff    Lab Results   Component Value Date/Time    WBC 5.7 02/06/2023 10:51 AM    WBC 6.6 07/09/2021 10:18 AM    RBC 4.10 02/06/2023 10:51 AM    RBC 3.64 (L) 07/09/2021 10:18 AM    HGB 13.7 02/06/2023 10:51 AM    HGB 12.6 07/09/2021 10:18 AM    HCT 40.5 02/06/2023 10:51 AM    HCT 37.5 07/09/2021 10:18 AM    MCV 99 02/06/2023 10:51 AM     (H) 07/09/2021 10:18 AM    MCH 33.4 (H) 02/06/2023 10:51 AM    MCH 34.6 (H) 07/09/2021 10:18 AM    MCHC 33.8 02/06/2023 10:51 AM    MCHC 33.6 07/09/2021 10:18 AM    RDW 11.8 02/06/2023 10:51 AM    RDW 12.2 07/09/2021 10:18 AM         Comprehensive Metabolic Profile    Lab Results   Component Value Date/Time     02/06/2023 10:51 AM     07/09/2021 10:18 AM    CO2 27 02/06/2023 10:51 AM    CO2 29 07/06/2022 10:26 AM    CO2 26 07/09/2021 10:18 AM    BUN 22.3 02/06/2023 10:51 AM    BUN 22 07/06/2022 10:26 AM    BUN 25 07/09/2021 10:18 AM    CR 1.11 (H) 02/06/2023 10:51 AM    CR 1.13 (H) 07/09/2021 10:18 AM    Lab Results   Component Value Date/Time    ALBUMIN 3.9 02/06/2023 10:51 AM    ALBUMIN 3.4 08/23/2022 09:51 AM    ALBUMIN 3.5 07/09/2021 10:18 AM    ALKPHOS 112 (H) 02/06/2023 10:51 AM    ALKPHOS 112 07/09/2021 10:18 AM    AST 32 02/06/2023 10:51 AM    AST 43 07/09/2021 10:18 AM    ALT 22 02/06/2023 10:51 AM    ALT 35 07/09/2021 10:18 AM          Assessment and Plan:    S/P Liver Transplantation:   - 8/18/2019 with DBD donor for ANGULO/A1AT/iron overload  - Bi-caval with duct to duct  - Episode of moderate ACR 3/21; treated  - History of biliary stricture; last ERCP 5/2021  - Noted mild Alk Phos elevation   - Management of ANGULO as per below   - If no improvement with weight loss, would repeat ERCP  - Post-  "transplant labs per routine    Management of NAFLD/ANGULO  - We spent time discussing an appropriate diet, exercise and weight loss plan.      - Recommend exercise regularly: 4+ times per week, with an average of about 40-45 minutes per day.      - It has shown that patients who exercise regularly can have improvement of insulin resistance and resolution of fatty liver disease, even if they are not able to lose weight.     - Recommend a low-carbohydrate, low-calorie diet (2925-7833 calories per day).    - An ideal weight loss plan would be to lose 7-10% of body weight over the next six months  - Management of cholesterol is also very important.    - The use of \"statins\" (HMG-CoA reductase medications) are an effective means of therapy and are not contra-indicated in those with abnormal liver tests OR those with cirrhosis.  The value of these medications in this population far outweigh the minor risks of abnormal liver tests.   - Goal LDL in those with ANGULO are < 100 mg/dL  - Consider the utility of liberalizing coffee consumption as some data that this may slow progression and reverse effects of ANGULO-related fibrosis.    Immunosuppression:   - Was transitioned to cyclosporin from tacrolimus for concerns of delerium  - Episode of moderate ACR 3/2021  - Currently on cyclosporine 75mg BID with goal  and MMF 250mg BID    - Will decrease cyclosporine target to   - Will plan to check immunosuppression trough levels per protocol to assess for adequate target dosing and will assess CBC and kidney function for toxicity of medications    Kidney Health:   - has developed mild CKD stage II/III  - Will continue to minimize CNI as able    Nutrition/Weight:    It is very common for patients to put on significant weight after liver transplantation, as they become conditioned to eating as much as possible to maintain a healthy weight pre-transplant.  There is a very significant risk of developing fatty liver and " non-alcoholic steatohepatitis in post-transplant patients, even in those who were not transplanted for ANGULO cirrhosis.  - Please work on eating a diet that is rice in fresh fruits and vegetables, moderate amounts of lean protein and dairy, and modest amounts of carbohydrates and fats.  - An exercise plan/regimen is an essential part of remaining healthy in the post-transplant setting and we recommend 30-35 minutes of exercise at least 4 days a week    Routine Health Care:  - You need to establish and maintain care with a primary care physician to manage: hypertension, high cholesterol, abnormal blood sugars - as these are all potential complications of your health after liver transplantation and will need a local physician to manage these issues.  - All patients with liver diseaes (even post transplant) are at an increased risk for osteoporosis.  We strongly recommend screening for Vitamin D deficiency at least twice yearly with aggressive supplementation/replacement as indicated.    - We also recommend a screening DEXA scan to evaluate for osteoporosis.  If present, should treat with calcium, Vitamin D supplementation, and recommend consideration of bisphosphonate therapy.  Also recommend follow up DEXA scans to evaluate for improvement of bone density on therapy.  - Recommend yearly skin exams with dermatology, and if skin cancers are found, recommend twice yearly exams  - Recommend you stay up to date for cancer screening: mammograms/PAP for women and prostate cancer screening for men, and colon cancer screening for all.  - Practice good hygiene with washing of hands and maintaining a clean living space as this will decrease your chances of developing an infection in the post-transplantation setting  - Eat a health diet: rich in fresh fruits and vegetables, lean proteins, and minimize carbohydrate and fat intake.      Follow up: 12 months     Thank you very much for the opportunity to participate in the care of  this patient.  If you have any further questions, please don't hesitate to contact our office.    __________________________________  Thomas M. Leventhal, M.D.   of Medicine  Advanced & Transplant Hepatology  [facility] [place]

## 2023-02-16 NOTE — PROGRESS NOTES
Date of Service: 2023     Subjective:          Nicci Pemberton is a 62 year old female presenting for follow up with history of liver transplantation    History of Present Illness   Nicci Pemberton is a 59 year old female with past medical history of obesity, lymphedema, and cirrhosis secondary to combination of nonalcoholic fatty liver disease, iron overload, and alpha-1 antitrypsin MZ phenotype who is s/p  donor liver transplant on 19 and presents in follow up.  Noted that she had an episode of biopsy-proven moderate ACR in 3/2021.     Since last seen she reports that her biggest issue has been ongoing persistent weight loss since the time of transplant.  Noted that she is gained approximately 50 pounds since surgical intervention.  Very clearly understands how this relates to her diet and lifestyle.  Notes that she has had extensive discussions with her  and in the past 2 weeks she they have agreed upon making a very focused and dedicated efforts to making lifestyle modifications that will improve both of their overall health status.  She reports that they are working hard to watch and minimize carbohydrate intake, watch overall caloric intake, and are making plans for increasing physical activity.      She has not had any hospitalizations over the past year    She continues on immunosuppression with cyclosporine 75 mg twice daily and  mg twice daily as she had an episode of moderate acute cellular rejection (MARVIN 5/9) noted on biopsy from 2021.      Surgical Course  - OLT date: 2019   - etiology: ANGULO/alpha 1  - donor: type DBD  - Induction IS: basiliximab  - CMV status D+/R+  - operation: Surgical technique caval replacement, biliary anastomosis: duct to duct  - CiT 335min, WiT 35min  - Episodes of Rejection:  moderate acute cellular rejection (MARVIN 5/9) noted on biopsy from 2021  - Biliary complications: high grade stricture s/p multiple ERCPs (last  5/2021)  - Other complications of immediate post-operative course: delerium with d/c of tacrolimus and start of cyclosporin    Past Medical History:  Past Medical History:   Diagnosis Date     Anemia      Cellulitis      Cirrhosis of liver (H) 06/18/2018     Gastroesophageal reflux disease      Heterozygous alpha 1-antitrypsin deficiency (H)      High hepatic iron concentration determined by biopsy of liver      Incisional infection 11/4/2021    Left TKA incision     Liver cirrhosis secondary to ANGULO (H)      Liver transplanted (H) 08/18/2019     Lymphedema      Osteoarthritis     knees     Other chronic pain     Left knee     SBP (spontaneous bacterial peritonitis) (H) 08/02/2019 8/1/19 in [facility]     Thrombocytopenia (H) 11/2020     Thyroid disease     Hypothyroid       Past Surgical History:  Past Surgical History:   Procedure Laterality Date     ARTHROPLASTY KNEE Right 10/23/2020    Procedure: Right  total knee arthroplasty;  Surgeon: Mario Wallace MD;  [facility]     ARTHROPLASTY KNEE Left 9/24/2021    Procedure: Left total knee arthroplasty;  Surgeon: Mario Wallace MD;  [facility]     BENCH LIVER N/A 8/18/2019    Procedure: BACKBENCH PREPARATION, LIVER;  Surgeon: Mandeep lAford MD;  [facility]     COLONOSCOPY N/A 6/22/2018    Procedure: COLONOSCOPY;  colonoscopy;  Surgeon: Hugo Patel MD;  [facility]     ENDOSCOPIC RETROGRADE CHOLANGIOPANCREATOGRAM N/A 2/10/2020    Procedure: ENDOSCOPIC RETROGRADE CHOLANGIOPANCREATOGRAPHY with spinchterotomy;  Surgeon: Guru Rigoberto Canada MD;  [facility]     ENDOSCOPIC RETROGRADE CHOLANGIOPANCREATOGRAM N/A 5/13/2020    Procedure: ENDOSCOPIC RETROGRADE CHOLANGIOPANCREATOGRAPHY,Stent exchange and sludge removal;  Surgeon: Guru Rigoberto Canada MD;  [facility]     ENDOSCOPIC RETROGRADE CHOLANGIOPANCREATOGRAM N/A 2/24/2021    Procedure: ENDOSCOPIC RETROGRADE CHOLANGIOPANCREATOGRAPHY, SPINCTEROTOMY,  DEBRIDE REMOVAL, STENT PLACEMENT;  Surgeon: Guru Rigoberto Canada MD;  [facility]     ENDOSCOPIC RETROGRADE CHOLANGIOPANCREATOGRAPHY, EXCHANGE TUBE/STENT N/A 3/4/2020    Procedure: ENDOSCOPIC RETROGRADE CHOLANGIOPANCREATOGRAPHY, WITH biliary dilarion, stent exchange, stone removal;  Surgeon: Guru Rigoberto Canada MD;  [facility]     ENDOSCOPIC RETROGRADE CHOLANGIOPANCREATOGRAPHY, EXCHANGE TUBE/STENT N/A 2021    Procedure: ENDOSCOPIC RETROGRADE CHOLANGIOPANCREATOGRAPHY WITH BILIARY STENT EXCHANGE, DILATION AND SLUDGE/STONE REMOVAL;  Surgeon: Guru Rigoberto Canada MD;  [facility]     ESOPHAGOSCOPY, GASTROSCOPY, DUODENOSCOPY (EGD), COMBINED N/A 10/31/2019    Procedure: Esophagogastroduodenoscopy, With Biopsy;  Surgeon: Nerissa Aranda MD;  [facility]     IR FEEDING TUBE PLACEMENT W MOHAN/MD  2019     IR FLUORO 0-1 HOUR  2019     IR LIVER BIOPSY PERCUTANEOUS  3/25/2021     IR LUMBAR PUNCTURE  2019     IRRIGATION AND DEBRIDEMENT LOWER EXTREMITY, COMBINED Left 11/10/2021    Procedure: Irrigation and debridement Left knee skin subcutaneous tissue (length: 10cm), Application of wound vac;  Surgeon: Mario Wallace MD;  [facility]     KNEE SURGERY  10/23/20     ID STOMACH SURGERY PROCEDURE UNLISTED  Liver transplant 19     TRANSPLANT LIVER RECIPIENT  DONOR N/A 2019    Procedure: TRANSPLANT, LIVER, RECIPIENT,  DONOR;  Surgeon: Mandeep Alford MD;  [facility]     US PARACENTESIS  2019     US PARACENTESIS WITH ALBUMIN  2019       Past Social History:  Social History     Tobacco Use     Smoking status: Former     Packs/day: 0.50     Years: 10.00     Pack years: 5.00     Types: Cigarettes     Start date: 2000     Quit date:      Years since quittin.3     Smokeless tobacco: Never   Substance Use Topics     Alcohol use: No     Comment: due to liver transplant,      Drug  use: No        Family History:  Family History   Problem Relation Age of Onset     Diabetes Maternal Grandfather         Diagnosed at age 83     Mental Illness Maternal Grandfather      No Known Problems Mother      Restless Leg Syndrome Father      Mental Illness Sister      Alcoholism Brother      No Known Problems Son      Heart Failure Paternal Grandmother      No Known Problems Son      No Known Problems Maternal Grandmother      Breast Cancer Paternal Aunt      Cirrhosis No family hx of      Liver Cancer No family hx of        Review of Systems  A complete 10 point review of systems was asked and answered in the negative unless specifically commented upon in the HPI    Current Outpatient Medications   Medication     acetaminophen (TYLENOL) 325 MG tablet     Cholecalciferol (VITAMIN D-3) 25 MCG (1000 UT) CAPS     COMPRESSION STOCKINGS     cycloSPORINE modified (GENERIC EQUIVALENT) 25 MG capsule     famotidine (PEPCID) 20 MG tablet     levothyroxine (SYNTHROID/LEVOTHROID) 125 MCG tablet     mycophenolate (GENERIC EQUIVALENT) 250 MG capsule     No current facility-administered medications for this visit.       Objective:         Vitals:    02/16/23 1136   BP: 138/87   BP Location: Right arm   Patient Position: Sitting   Cuff Size: Adult Large   Pulse: 94   SpO2: 97%   Weight: 113.9 kg (251 lb)     Body mass index is 45.91 kg/m .     Physical Exam    Vitals reviewed.   Constitutional: Well-developed, well-nourished, in no apparent distress.    HEENT: Normocephalic. no scleral icterus.   Neck/Lymph: Normal ROM, supple.  Cardiac:  Regular rate and rhythm.   Respiratory: Normal respiratory excursion   GI:  Abdomen soft, obese  Skin:  Skin is warm and dry. No rash noted.  Peripheral Vascular: b/l LE edema, 2+ pulses in all extremities  Musculoskeletal:  ROM intact, normal muscle bulk    Psychiatric: Normal mood and affect. Behavior is normal.  Neuro: no tremor, A&Ox3    Labs and Diagnostic tests:  CBC w/Diff    Lab  Results   Component Value Date/Time    WBC 5.7 02/06/2023 10:51 AM    WBC 6.6 07/09/2021 10:18 AM    RBC 4.10 02/06/2023 10:51 AM    RBC 3.64 (L) 07/09/2021 10:18 AM    HGB 13.7 02/06/2023 10:51 AM    HGB 12.6 07/09/2021 10:18 AM    HCT 40.5 02/06/2023 10:51 AM    HCT 37.5 07/09/2021 10:18 AM    MCV 99 02/06/2023 10:51 AM     (H) 07/09/2021 10:18 AM    MCH 33.4 (H) 02/06/2023 10:51 AM    MCH 34.6 (H) 07/09/2021 10:18 AM    MCHC 33.8 02/06/2023 10:51 AM    MCHC 33.6 07/09/2021 10:18 AM    RDW 11.8 02/06/2023 10:51 AM    RDW 12.2 07/09/2021 10:18 AM         Comprehensive Metabolic Profile    Lab Results   Component Value Date/Time     02/06/2023 10:51 AM     07/09/2021 10:18 AM    CO2 27 02/06/2023 10:51 AM    CO2 29 07/06/2022 10:26 AM    CO2 26 07/09/2021 10:18 AM    BUN 22.3 02/06/2023 10:51 AM    BUN 22 07/06/2022 10:26 AM    BUN 25 07/09/2021 10:18 AM    CR 1.11 (H) 02/06/2023 10:51 AM    CR 1.13 (H) 07/09/2021 10:18 AM    Lab Results   Component Value Date/Time    ALBUMIN 3.9 02/06/2023 10:51 AM    ALBUMIN 3.4 08/23/2022 09:51 AM    ALBUMIN 3.5 07/09/2021 10:18 AM    ALKPHOS 112 (H) 02/06/2023 10:51 AM    ALKPHOS 112 07/09/2021 10:18 AM    AST 32 02/06/2023 10:51 AM    AST 43 07/09/2021 10:18 AM    ALT 22 02/06/2023 10:51 AM    ALT 35 07/09/2021 10:18 AM          Assessment and Plan:    S/P Liver Transplantation:   - 8/18/2019 with DBD donor for ANGULO/A1AT/iron overload  - Bi-caval with duct to duct  - Episode of moderate ACR 3/21; treated  - History of biliary stricture; last ERCP 5/2021  - Noted mild Alk Phos elevation   - Management of ANGULO as per below   - If no improvement with weight loss, would repeat ERCP  - Post- transplant labs per routine    Management of NAFLD/ANGULO  - We spent time discussing an appropriate diet, exercise and weight loss plan.      - Recommend exercise regularly: 4+ times per week, with an average of about 40-45 minutes per day.      - It has shown that patients who  "exercise regularly can have improvement of insulin resistance and resolution of fatty liver disease, even if they are not able to lose weight.     - Recommend a low-carbohydrate, low-calorie diet (6545-6738 calories per day).    - An ideal weight loss plan would be to lose 7-10% of body weight over the next six months  - Management of cholesterol is also very important.    - The use of \"statins\" (HMG-CoA reductase medications) are an effective means of therapy and are not contra-indicated in those with abnormal liver tests OR those with cirrhosis.  The value of these medications in this population far outweigh the minor risks of abnormal liver tests.   - Goal LDL in those with ANGULO are < 100 mg/dL  - Consider the utility of liberalizing coffee consumption as some data that this may slow progression and reverse effects of ANGULO-related fibrosis.    Immunosuppression:   - Was transitioned to cyclosporin from tacrolimus for concerns of delerium  - Episode of moderate ACR 3/2021  - Currently on cyclosporine 75mg BID with goal  and MMF 250mg BID    - Will decrease cyclosporine target to   - Will plan to check immunosuppression trough levels per protocol to assess for adequate target dosing and will assess CBC and kidney function for toxicity of medications    Kidney Health:   - has developed mild CKD stage II/III  - Will continue to minimize CNI as able    Nutrition/Weight:    It is very common for patients to put on significant weight after liver transplantation, as they become conditioned to eating as much as possible to maintain a healthy weight pre-transplant.  There is a very significant risk of developing fatty liver and non-alcoholic steatohepatitis in post-transplant patients, even in those who were not transplanted for ANGULO cirrhosis.  - Please work on eating a diet that is rice in fresh fruits and vegetables, moderate amounts of lean protein and dairy, and modest amounts of carbohydrates and fats.  - " An exercise plan/regimen is an essential part of remaining healthy in the post-transplant setting and we recommend 30-35 minutes of exercise at least 4 days a week    Routine Health Care:  - You need to establish and maintain care with a primary care physician to manage: hypertension, high cholesterol, abnormal blood sugars - as these are all potential complications of your health after liver transplantation and will need a local physician to manage these issues.  - All patients with liver diseaes (even post transplant) are at an increased risk for osteoporosis.  We strongly recommend screening for Vitamin D deficiency at least twice yearly with aggressive supplementation/replacement as indicated.    - We also recommend a screening DEXA scan to evaluate for osteoporosis.  If present, should treat with calcium, Vitamin D supplementation, and recommend consideration of bisphosphonate therapy.  Also recommend follow up DEXA scans to evaluate for improvement of bone density on therapy.  - Recommend yearly skin exams with dermatology, and if skin cancers are found, recommend twice yearly exams  - Recommend you stay up to date for cancer screening: mammograms/PAP for women and prostate cancer screening for men, and colon cancer screening for all.  - Practice good hygiene with washing of hands and maintaining a clean living space as this will decrease your chances of developing an infection in the post-transplantation setting  - Eat a health diet: rich in fresh fruits and vegetables, lean proteins, and minimize carbohydrate and fat intake.      Follow up: 12 months     Thank you very much for the opportunity to participate in the care of this patient.  If you have any further questions, please don't hesitate to contact our office.    __________________________________  Thomas M. Leventhal, M.D.   of Medicine  Advanced & Transplant Hepatology  [facility]

## 2023-02-16 NOTE — NURSING NOTE
Chief Complaint   Patient presents with     RECHECK     S/p liver tx     Blood pressure 138/87, pulse 94, weight 113.9 kg (251 lb), SpO2 97 %, not currently breastfeeding.    Addi Mary on 2/16/2023 at 11:38 AM

## 2023-05-09 ENCOUNTER — LAB (OUTPATIENT)
Dept: LAB | Facility: CLINIC | Age: 63
End: 2023-05-09
Payer: COMMERCIAL

## 2023-05-09 DIAGNOSIS — Z94.4 LIVER REPLACED BY TRANSPLANT (H): Primary | ICD-10-CM

## 2023-05-09 DIAGNOSIS — Z94.4 LIVER REPLACED BY TRANSPLANT (H): ICD-10-CM

## 2023-05-09 LAB
ALBUMIN SERPL BCG-MCNC: 4.1 G/DL (ref 3.5–5.2)
ALP SERPL-CCNC: 101 U/L (ref 35–104)
ALT SERPL W P-5'-P-CCNC: 23 U/L (ref 10–35)
ANION GAP SERPL CALCULATED.3IONS-SCNC: 10 MMOL/L (ref 7–15)
AST SERPL W P-5'-P-CCNC: 31 U/L (ref 10–35)
BILIRUB DIRECT SERPL-MCNC: 0.31 MG/DL (ref 0–0.3)
BILIRUB SERPL-MCNC: 1.5 MG/DL
BUN SERPL-MCNC: 25.7 MG/DL (ref 8–23)
CALCIUM SERPL-MCNC: 9.9 MG/DL (ref 8.8–10.2)
CHLORIDE SERPL-SCNC: 107 MMOL/L (ref 98–107)
CREAT SERPL-MCNC: 1.05 MG/DL (ref 0.51–0.95)
DEPRECATED HCO3 PLAS-SCNC: 26 MMOL/L (ref 22–29)
ERYTHROCYTE [DISTWIDTH] IN BLOOD BY AUTOMATED COUNT: 12.3 % (ref 10–15)
GFR SERPL CREATININE-BSD FRML MDRD: 60 ML/MIN/1.73M2
GLUCOSE SERPL-MCNC: 92 MG/DL (ref 70–99)
HCT VFR BLD AUTO: 40.6 % (ref 35–47)
HGB BLD-MCNC: 13.5 G/DL (ref 11.7–15.7)
MCH RBC QN AUTO: 32.6 PG (ref 26.5–33)
MCHC RBC AUTO-ENTMCNC: 33.3 G/DL (ref 31.5–36.5)
MCV RBC AUTO: 98 FL (ref 78–100)
PLATELET # BLD AUTO: 131 10E3/UL (ref 150–450)
POTASSIUM SERPL-SCNC: 4.8 MMOL/L (ref 3.4–5.3)
PROT SERPL-MCNC: 7.1 G/DL (ref 6.4–8.3)
RBC # BLD AUTO: 4.14 10E6/UL (ref 3.8–5.2)
SODIUM SERPL-SCNC: 143 MMOL/L (ref 136–145)
WBC # BLD AUTO: 4.5 10E3/UL (ref 4–11)

## 2023-05-09 PROCEDURE — 82248 BILIRUBIN DIRECT: CPT

## 2023-05-09 PROCEDURE — 85027 COMPLETE CBC AUTOMATED: CPT

## 2023-05-09 PROCEDURE — 36415 COLL VENOUS BLD VENIPUNCTURE: CPT

## 2023-05-09 PROCEDURE — 80158 DRUG ASSAY CYCLOSPORINE: CPT

## 2023-05-09 PROCEDURE — 80053 COMPREHEN METABOLIC PANEL: CPT

## 2023-05-09 NOTE — PROGRESS NOTES
Pt's plt decreased from goal. Per dr leventhal patient to repeat CBC only in 1-2 weeks.     Spoke with patient. She is down 25 pounds.

## 2023-05-10 LAB
CYCLOSPORINE BLD LC/MS/MS-MCNC: 77 UG/L (ref 50–400)
TME LAST DOSE: NORMAL H
TME LAST DOSE: NORMAL H

## 2023-05-17 ENCOUNTER — LAB (OUTPATIENT)
Dept: LAB | Facility: CLINIC | Age: 63
End: 2023-05-17
Payer: COMMERCIAL

## 2023-05-17 DIAGNOSIS — Z94.4 LIVER REPLACED BY TRANSPLANT (H): ICD-10-CM

## 2023-05-17 LAB
ERYTHROCYTE [DISTWIDTH] IN BLOOD BY AUTOMATED COUNT: 12.4 % (ref 10–15)
HCT VFR BLD AUTO: 39.5 % (ref 35–47)
HGB BLD-MCNC: 13.4 G/DL (ref 11.7–15.7)
MCH RBC QN AUTO: 33.5 PG (ref 26.5–33)
MCHC RBC AUTO-ENTMCNC: 33.9 G/DL (ref 31.5–36.5)
MCV RBC AUTO: 99 FL (ref 78–100)
PLATELET # BLD AUTO: 150 10E3/UL (ref 150–450)
RBC # BLD AUTO: 4 10E6/UL (ref 3.8–5.2)
WBC # BLD AUTO: 5.5 10E3/UL (ref 4–11)

## 2023-05-17 PROCEDURE — 85027 COMPLETE CBC AUTOMATED: CPT

## 2023-05-17 PROCEDURE — 36415 COLL VENOUS BLD VENIPUNCTURE: CPT

## 2023-08-07 ENCOUNTER — LAB (OUTPATIENT)
Dept: LAB | Facility: CLINIC | Age: 63
End: 2023-08-07
Payer: COMMERCIAL

## 2023-08-07 DIAGNOSIS — Z94.4 LIVER REPLACED BY TRANSPLANT (H): ICD-10-CM

## 2023-08-07 LAB
ALBUMIN SERPL BCG-MCNC: 4 G/DL (ref 3.5–5.2)
ALP SERPL-CCNC: 104 U/L (ref 35–104)
ALT SERPL W P-5'-P-CCNC: 18 U/L (ref 0–50)
ANION GAP SERPL CALCULATED.3IONS-SCNC: 8 MMOL/L (ref 7–15)
AST SERPL W P-5'-P-CCNC: 33 U/L (ref 0–45)
BILIRUB DIRECT SERPL-MCNC: 0.22 MG/DL (ref 0–0.3)
BILIRUB SERPL-MCNC: 1.2 MG/DL
BUN SERPL-MCNC: 23.8 MG/DL (ref 8–23)
CALCIUM SERPL-MCNC: 10.2 MG/DL (ref 8.8–10.2)
CHLORIDE SERPL-SCNC: 106 MMOL/L (ref 98–107)
CREAT SERPL-MCNC: 0.96 MG/DL (ref 0.51–0.95)
DEPRECATED HCO3 PLAS-SCNC: 26 MMOL/L (ref 22–29)
ERYTHROCYTE [DISTWIDTH] IN BLOOD BY AUTOMATED COUNT: 11.9 % (ref 10–15)
GFR SERPL CREATININE-BSD FRML MDRD: 66 ML/MIN/1.73M2
GLUCOSE SERPL-MCNC: 89 MG/DL (ref 70–99)
HCT VFR BLD AUTO: 40.3 % (ref 35–47)
HGB BLD-MCNC: 13.3 G/DL (ref 11.7–15.7)
MCH RBC QN AUTO: 33.1 PG (ref 26.5–33)
MCHC RBC AUTO-ENTMCNC: 33 G/DL (ref 31.5–36.5)
MCV RBC AUTO: 100 FL (ref 78–100)
PLATELET # BLD AUTO: 140 10E3/UL (ref 150–450)
POTASSIUM SERPL-SCNC: 5.4 MMOL/L (ref 3.4–5.3)
PROT SERPL-MCNC: 7.4 G/DL (ref 6.4–8.3)
RBC # BLD AUTO: 4.02 10E6/UL (ref 3.8–5.2)
SODIUM SERPL-SCNC: 140 MMOL/L (ref 136–145)
WBC # BLD AUTO: 5.8 10E3/UL (ref 4–11)

## 2023-08-07 PROCEDURE — 82248 BILIRUBIN DIRECT: CPT

## 2023-08-07 PROCEDURE — 80053 COMPREHEN METABOLIC PANEL: CPT

## 2023-08-07 PROCEDURE — 85027 COMPLETE CBC AUTOMATED: CPT

## 2023-08-07 PROCEDURE — 36415 COLL VENOUS BLD VENIPUNCTURE: CPT

## 2023-08-07 PROCEDURE — 80158 DRUG ASSAY CYCLOSPORINE: CPT

## 2023-08-08 ENCOUNTER — TELEPHONE (OUTPATIENT)
Dept: TRANSPLANT | Facility: CLINIC | Age: 63
End: 2023-08-08
Payer: COMMERCIAL

## 2023-08-08 LAB
CYCLOSPORINE BLD LC/MS/MS-MCNC: 82 UG/L (ref 50–400)
TME LAST DOSE: NORMAL H
TME LAST DOSE: NORMAL H

## 2023-08-08 NOTE — TELEPHONE ENCOUNTER
Spoke to pt who wants to discuss the reason why her potassium elevated. Reviewed foods that pt eats and she consumes tomatoes regularly. Suggested pt refrain from them and see what the next K+ check will be.

## 2023-08-08 NOTE — TELEPHONE ENCOUNTER
Patient Call: General  Route to LPN    Reason for call: Nicci wanting to follow up with her Coordinator regarding a MyChart message that was sent to her. No other details was provided    Call back needed? Yes    Return Call Needed  Same as documented in contacts section  When to return call?: Same day: Route High Priority

## 2023-08-17 NOTE — PLAN OF CARE
"Subjective:      Patient ID: Carrington Esquivel is a 10 y.o. male.  CC: tics    Suspicion of motor tics that started in may to the left side of his face.  They have resolved.  It lasted about 3 weeks. Was off and on throughout the day.  Mom describes it has eye brow twitching to the left side of his face.    Mom unsure if it was his eye twitching and thought eye brow twitching.  Carrington does not remember it much, says he forgot it even happened.  Prior neurology history:    At 9 months old mom says was dx with partial seizures- was on a seizure medication that mom hated and he was taken off of this after EEG of 24hrs was normal.    He has a history of developmental delay and had a prior Mri with "scar tissue" mom says- gliosis per chart review.  Was seen by Dr. Bowman at this time at [facility] from 9 months till approx 2 y.o of age.     Many years passed and he came to [facility] at the age of 6 after  2019 after he had a weird event when he was febrile- he had  rapid blinking, but was able to stand and walk and talk. This has never recurred.   Repeat EEG in 2019 was abnormal with rolandic spikes but Dr. Koch said not to worry about this.     Now very intelligent mom says, a little lazy and does not put in the work for school. A/B honor roll in regular classes with no accommodations.     PMH: DD,  history of focal seizures 9 months? Questionable how long- did not take seizure meds but a few weeks, High cholesterol.     Surg Hxy: circumcision     Fam Hxy: maternal cousin with seizures     Social Hxy: 5th grade, 2 siblings, lives at home with mom.     Allergies: No allergies     Medications:   None     The following portions of the patient's history were reviewed and updated as appropriate: allergies, current medications, past family history, past medical history, past social history, past surgical history and problem list.    Objective:   Review of Systems   Neurological:  Negative for dizziness, vertigo, tremors, " Discharge Planner PT  7A  Patient plan for discharge: Not stated this date  Current status: Pt with continued progression in standing tolerance this date, though continues to be limited by confusion and lethargy. MaxAx2 for supine>sit within precautions, Rafa required for seated balance EOB 2/2 posterior lean. Pt dependently transferred EOB>recliner via EZ stand. 2 additional sit<>stands performed with modAx2 and continuous cueing for upright posture. Increased standing tolerance this date, up to 90 seconds at a time.   Barriers to return to prior living situation: Medical status, level of assist, weakness, decreased activity tolerance, impaired cognition, pain  Recommendations for discharge: TCU  Rationale for recommendations: Pt would benefit from continued skilled therapy services to progress strength, endurance, balance, and safety and independence with functional mobility prior to safe return home.        Entered by: Naida Khalil 08/28/2019 10:54 AM        seizures, syncope, facial asymmetry, speech difficulty, weakness, light-headedness, numbness, headaches and memory loss.        Tics          Physical Exam  Constitutional:       General: He is active.   HENT:      Head: Normocephalic.   Neurological:      General: No focal deficit present.      Mental Status: He is alert.      Cranial Nerves: No cranial nerve deficit.      Sensory: No sensory deficit.      Motor: No weakness.      Coordination: Coordination normal.      Gait: Gait normal.   Psychiatric:         Mood and Affect: Mood normal.         Behavior: Behavior normal.         Thought Content: Thought content normal.         Judgment: Judgment normal.                       Imaging:  Per report, reviewed records.     EEG:   Reviewed from 2019.   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 Twitching to his face that could have been focal seizures vs motor tics. Symptoms occurred intermittently throughout the day for approx 3 weeks and have resolved. No other clinical symptoms for seizures. Mom prefers to not do medicine and or additional testing at this time; they were told to ignore the symptoms by PCM and they went away so from moms perspective it is a transient motor tic. We reviewed s/s of rolandic epilepsy and what to watch for (twitching, drooling, numbness or tingling to the face or tongue. Occasionally GTC sz can occur in patients with BECTs. If facial twitching occurs again, please video and will plan for an EEG and or treatment with AED.     Plan:     Fu PRN.     Reviewed when to RTC or report to ER for declining neurological status.      TIME SPENT IN ENCOUNTER : I spent 60 minutes face to face with the patient and family; > 50% was spent counseling them regarding findings from the available records including test/study results and their meaning, the diagnosis/differential diagnosis, diagnostic/treatment recommendations, therapeutic options, risks and benefits of management  options, prognosis, plan/ instructions for management/use of medications, education, compliance and risk-factor reduction as well as in coordination of care and follow up plans.      Juanita Daley DNP, APRN, FNP-C  Pediatric Neurology Nurse Practitioner  Instructor of Pediatric Neurology

## 2023-08-18 ENCOUNTER — TELEPHONE (OUTPATIENT)
Dept: GASTROENTEROLOGY | Facility: CLINIC | Age: 63
End: 2023-08-18
Payer: COMMERCIAL

## 2023-09-12 ENCOUNTER — OFFICE VISIT (OUTPATIENT)
Dept: DERMATOLOGY | Facility: CLINIC | Age: 63
End: 2023-09-12
Payer: COMMERCIAL

## 2023-09-12 ENCOUNTER — ANCILLARY PROCEDURE (OUTPATIENT)
Dept: MAMMOGRAPHY | Facility: CLINIC | Age: 63
End: 2023-09-12
Attending: FAMILY MEDICINE
Payer: COMMERCIAL

## 2023-09-12 DIAGNOSIS — Z94.4: ICD-10-CM

## 2023-09-12 DIAGNOSIS — D22.9 MULTIPLE BENIGN NEVI: ICD-10-CM

## 2023-09-12 DIAGNOSIS — L81.4 LENTIGINES: ICD-10-CM

## 2023-09-12 DIAGNOSIS — L91.0 HYPERTROPHIC SCAR: Primary | ICD-10-CM

## 2023-09-12 DIAGNOSIS — D49.2 NEOPLASM OF SKIN: ICD-10-CM

## 2023-09-12 DIAGNOSIS — D18.01 CHERRY ANGIOMA: ICD-10-CM

## 2023-09-12 DIAGNOSIS — Z12.31 VISIT FOR SCREENING MAMMOGRAM: ICD-10-CM

## 2023-09-12 DIAGNOSIS — L82.1 SEBORRHEIC KERATOSIS: ICD-10-CM

## 2023-09-12 PROCEDURE — 11103 TANGNTL BX SKIN EA SEP/ADDL: CPT | Performed by: DERMATOLOGY

## 2023-09-12 PROCEDURE — 77063 BREAST TOMOSYNTHESIS BI: CPT | Performed by: RADIOLOGY

## 2023-09-12 PROCEDURE — 77067 SCR MAMMO BI INCL CAD: CPT | Performed by: RADIOLOGY

## 2023-09-12 PROCEDURE — 88305 TISSUE EXAM BY PATHOLOGIST: CPT | Mod: TC | Performed by: DERMATOLOGY

## 2023-09-12 PROCEDURE — 99213 OFFICE O/P EST LOW 20 MIN: CPT | Mod: 25 | Performed by: DERMATOLOGY

## 2023-09-12 PROCEDURE — 88305 TISSUE EXAM BY PATHOLOGIST: CPT | Mod: 26 | Performed by: PATHOLOGY

## 2023-09-12 PROCEDURE — 11102 TANGNTL BX SKIN SINGLE LES: CPT | Performed by: DERMATOLOGY

## 2023-09-12 ASSESSMENT — PAIN SCALES - GENERAL: PAINLEVEL: NO PAIN (0)

## 2023-09-12 NOTE — NURSING NOTE
Lidocaine-epinephrine 1-1:113439 % injection   2mL once for one use, starting 9/12/2023 ending 9/12/2023,  2mL disp, R-0, injection  Injected by Alethea Reeves RN

## 2023-09-12 NOTE — PROGRESS NOTES
[facility] Dermatology Note  Encounter Date: Sep 12, 2023  Office Visit     Dermatology Problem List:  1. History of liver transplant 8/2019 2/2 A1AT.  # Benign bx:  - Lentiginous junctional melanocytic nevus, right knee, s/p bx 9/6/2022  - Lentiginous junctional melanocytic nevus, left upper chest, s/p bx 9/6/2022  ____________________________________________    Assessment & Plan:    # NUBs - ddx symptomatic nevi x2.  - Central lower back  - R medial buttocks  - shave x2    # Hypertrophic scar, L upper chest, at site of prior bx.  - were going to do ILK today but has mammogram next and want to make sure won't interfere so will defer to next visit    # Lesions to monitor:  - R lower lip, suspect labial melanotic macule  - L shoulder, suspect lentigo  - Photos today, recheck 6 months    # Benign lesions - SKs, cherry angiomas, lentigenes.  - No treatment required    # Multiple benign nevi.   - Monitor for ABCDEs of melanoma   - Continue sun protection - recommend SPF 30 or higher with frequent application   - Return sooner if noticing changing or symptomatic lesions     # History of liver transplant 8/2019. Patient is aware that she is at higher risk for cutaneous malignancy given history of transplant. Monitor for changing or symptomatic lesions. Continue annual skin checks and photoprotection.    Procedures Performed:   Shave biopsy: Location(s) see above.  After discussion of benefits and risks including but not limited to bleeding/bruising, pain/swelling, infection, scar, incomplete removal, nerve damage/numbness, recurrence, and non-diagnostic biopsy,  verbal consent and photographs were obtained. Time-out was performed. The area was cleaned with isopropyl alcohol. 0.5mL of 1% lidocaine with epinephrine was injected to obtain adequate anesthesia of each lesion. Shave biopsy was performed. Hemostasis was achieved with aluminium chloride. Vaseline and a sterile dressing were applied. The  patient tolerated the procedure and no complications were noted. The patient was provided with verbal and written post care instructions.       Follow-up: 6 months recheck lip and left shoulder, sooner if concerns.     Staff:    Marilynn Thomas MD    Department of Dermatology  [facility]: Phone: [phone], Fax: [phone]  9/12/2023      ____________________________________________    CC: Skin Check (Here today for a skin check. Spot on lip to check. )    HPI:  Ms. Nicci Pemberton is a(n) 63 year old female who presents today as a return patient for FBSE.     Dark spot on right lower lip, hadn't seen before  Saw 4 months ago, no change since    Itchy scar left chest    Bother spots back, buttocks    Patient is otherwise feeling well, without additional skin concerns.    Labs Reviewed:  N/A    Physical Exam:  Vitals: There were no vitals taken for this visit.  SKIN: Full body skin exam excluding the genitals was performed including face, scalp, neck, ears, chest, back, bilateral arms, hands, bilateral legs, feet, and buttocks.   - slightly hypertrophic pink papule at site of scar left upper chset  - 4 mm even medium brown macule right lower lip  - 3.5x4 mm somewhat stellate shaped brown macule on left upper back  - fleshy pedunculated papules lower central back and right medial buttocks  - Multiple regular brown pigmented macules and papules are identified on the trunk and extremities.   - There are waxy stuck on tan to brown papules on the trunk and extremites.   - There is a firm tan/flesh colored papule that dimples with lateral pressure on the right medial thigh.  - No other lesions of concern on areas examined.     Medications:  Current Outpatient Medications   Medication    acetaminophen (TYLENOL) 325 MG tablet    Cholecalciferol (VITAMIN D-3) 25 MCG (1000 UT) CAPS    COMPRESSION STOCKINGS    cycloSPORINE modified  (GENERIC EQUIVALENT) 25 MG capsule    famotidine (PEPCID) 20 MG tablet    levothyroxine (SYNTHROID/LEVOTHROID) 125 MCG tablet    mycophenolate (GENERIC EQUIVALENT) 250 MG capsule     No current facility-administered medications for this visit.      Past Medical History:   Patient Active Problem List   Diagnosis    Heterozygous alpha 1-antitrypsin deficiency (H)    Iron overload    Status post liver transplantation (H)    C. difficile diarrhea    Steroid-induced hyperglycemia    Immunosuppressed status (H)    History of liver recipient (H)    Vertigo    Otitis media    Bile duct stricture    Cholangitis    Common bile duct stenosis    Abnormal liver function    Acquired lymphedema of leg    Chronic pain of both knees    Venous hypertension of lower extremity, bilateral    Cramp in lower extremity associated with sleep    Obesity with serious comorbidity    Edema    Hypertension, unspecified type    Gastroesophageal reflux disease without esophagitis    Hyponatremia    Hypothyroidism    Leg swelling    Lymphedema    Macrocytosis without anemia    Malaise    Fatty liver disease, nonalcoholic    Osteoarthritis of both knees, unspecified osteoarthritis type    Osteoarthritis of knee    Peripheral neuropathy    Post-menopausal bleeding    Slow transit constipation    Thrombocytopenia (H)    Vitamin D deficiency    Zinc deficiency    Bilateral edema of lower extremity    Physical debility    Diverticulitis of colon    Suspected 2019 novel coronavirus infection    Morbid obesity (H)    S/P liver transplant (H)    Liver transplant rejection (H)    Biliary stricture    Chronic kidney disease, stage 3 (H)    Left knee pain    Status post total knee replacement    Incisional infection     Past Medical History:   Diagnosis Date    Anemia     Cellulitis     Cirrhosis of liver (H) 06/18/2018    Gastroesophageal reflux disease     Heterozygous alpha 1-antitrypsin deficiency (H)     High hepatic iron concentration determined by  biopsy of liver     Incisional infection 11/4/2021    Left TKA incision    Liver cirrhosis secondary to ANGULO (H)     Liver transplanted (H) 08/18/2019    Lymphedema     Osteoarthritis     knees    Other chronic pain     Left knee    SBP (spontaneous bacterial peritonitis) (H) 08/02/2019 8/1/19 in [place]    Thrombocytopenia (H) 11/2020    Thyroid disease     Hypothyroid        CC Referred Self, MD  No address on file on close of this encounter.

## 2023-09-12 NOTE — LETTER
9/12/2023       RE: Nicci Pemberton  [address] [place] [address]     Dear Colleague,    Thank you for referring your patient, Nicci Pemberton, to the [facility] [place] at [facility]. Please see a copy of my visit note below.    [facility] Dermatology Note  Encounter Date: Sep 12, 2023  Office Visit     Dermatology Problem List:  1. History of liver transplant 8/2019 2/2 A1AT.  # Benign bx:  - Lentiginous junctional melanocytic nevus, right knee, s/p bx 9/6/2022  - Lentiginous junctional melanocytic nevus, left upper chest, s/p bx 9/6/2022  ____________________________________________    Assessment & Plan:    # NUBs - ddx symptomatic nevi x2.  - Central lower back  - R medial buttocks  - shave x2    # Hypertrophic scar, L upper chest, at site of prior bx.  - were going to do ILK today but has mammogram next and want to make sure won't interfere so will defer to next visit    # Lesions to monitor:  - R lower lip, suspect labial melanotic macule  - L shoulder, suspect lentigo  - Photos today, recheck 6 months    # Benign lesions - SKs, cherry angiomas, lentigenes.  - No treatment required    # Multiple benign nevi.   - Monitor for ABCDEs of melanoma   - Continue sun protection - recommend SPF 30 or higher with frequent application   - Return sooner if noticing changing or symptomatic lesions     # History of liver transplant 8/2019. Patient is aware that she is at higher risk for cutaneous malignancy given history of transplant. Monitor for changing or symptomatic lesions. Continue annual skin checks and photoprotection.    Procedures Performed:   Shave biopsy: Location(s) see above.  After discussion of benefits and risks including but not limited to bleeding/bruising, pain/swelling, infection, scar, incomplete removal, nerve damage/numbness, recurrence, and non-diagnostic biopsy,  verbal consent and photographs  were obtained. Time-out was performed. The area was cleaned with isopropyl alcohol. 0.5mL of 1% lidocaine with epinephrine was injected to obtain adequate anesthesia of each lesion. Shave biopsy was performed. Hemostasis was achieved with aluminium chloride. Vaseline and a sterile dressing were applied. The patient tolerated the procedure and no complications were noted. The patient was provided with verbal and written post care instructions.       Follow-up: 6 months recheck lip and left shoulder, sooner if concerns.     Staff:    Marilynn Thomas MD    Department of Dermatology  [facility]: Phone: [phone], Fax: [phone]  9/12/2023      ____________________________________________    CC: Skin Check (Here today for a skin check. Spot on lip to check. )    HPI:  Ms. Nicci Pemberton is a(n) 63 year old female who presents today as a return patient for FBSE.     Dark spot on right lower lip, hadn't seen before  Saw 4 months ago, no change since    Itchy scar left chest    Bother spots back, buttocks    Patient is otherwise feeling well, without additional skin concerns.    Labs Reviewed:  N/A    Physical Exam:  Vitals: There were no vitals taken for this visit.  SKIN: Full body skin exam excluding the genitals was performed including face, scalp, neck, ears, chest, back, bilateral arms, hands, bilateral legs, feet, and buttocks.   - slightly hypertrophic pink papule at site of scar left upper chset  - 4 mm even medium brown macule right lower lip  - 3.5x4 mm somewhat stellate shaped brown macule on left upper back  - fleshy pedunculated papules lower central back and right medial buttocks  - Multiple regular brown pigmented macules and papules are identified on the trunk and extremities.   - There are waxy stuck on tan to brown papules on the trunk and extremites.   - There is a firm tan/flesh colored papule that  dimples with lateral pressure on the right medial thigh.  - No other lesions of concern on areas examined.     Medications:  Current Outpatient Medications   Medication    acetaminophen (TYLENOL) 325 MG tablet    Cholecalciferol (VITAMIN D-3) 25 MCG (1000 UT) CAPS    COMPRESSION STOCKINGS    cycloSPORINE modified (GENERIC EQUIVALENT) 25 MG capsule    famotidine (PEPCID) 20 MG tablet    levothyroxine (SYNTHROID/LEVOTHROID) 125 MCG tablet    mycophenolate (GENERIC EQUIVALENT) 250 MG capsule     No current facility-administered medications for this visit.      Past Medical History:   Patient Active Problem List   Diagnosis    Heterozygous alpha 1-antitrypsin deficiency (H)    Iron overload    Status post liver transplantation (H)    C. difficile diarrhea    Steroid-induced hyperglycemia    Immunosuppressed status (H)    History of liver recipient (H)    Vertigo    Otitis media    Bile duct stricture    Cholangitis    Common bile duct stenosis    Abnormal liver function    Acquired lymphedema of leg    Chronic pain of both knees    Venous hypertension of lower extremity, bilateral    Cramp in lower extremity associated with sleep    Obesity with serious comorbidity    Edema    Hypertension, unspecified type    Gastroesophageal reflux disease without esophagitis    Hyponatremia    Hypothyroidism    Leg swelling    Lymphedema    Macrocytosis without anemia    Malaise    Fatty liver disease, nonalcoholic    Osteoarthritis of both knees, unspecified osteoarthritis type    Osteoarthritis of knee    Peripheral neuropathy    Post-menopausal bleeding    Slow transit constipation    Thrombocytopenia (H)    Vitamin D deficiency    Zinc deficiency    Bilateral edema of lower extremity    Physical debility    Diverticulitis of colon    Suspected 2019 novel coronavirus infection    Morbid obesity (H)    S/P liver transplant (H)    Liver transplant rejection (H)    Biliary stricture    Chronic kidney disease, stage 3 (H)    Left knee  pain    Status post total knee replacement    Incisional infection     Past Medical History:   Diagnosis Date    Anemia     Cellulitis     Cirrhosis of liver (H) 06/18/2018    Gastroesophageal reflux disease     Heterozygous alpha 1-antitrypsin deficiency (H)     High hepatic iron concentration determined by biopsy of liver     Incisional infection 11/4/2021    Left TKA incision    Liver cirrhosis secondary to ANGULO (H)     Liver transplanted (H) 08/18/2019    Lymphedema     Osteoarthritis     knees    Other chronic pain     Left knee    SBP (spontaneous bacterial peritonitis) (H) 08/02/2019 8/1/19 in [place]    Thrombocytopenia (H) 11/2020    Thyroid disease     Hypothyroid        CC Referred Self, MD  No address on file on close of this encounter.

## 2023-09-12 NOTE — PATIENT INSTRUCTIONS
Wound Care After a Biopsy    What is a skin biopsy?  A skin biopsy allows the doctor to examine a very small piece of tissue under the microscope to determine the diagnosis and the best treatment for the skin condition. A local anesthetic (numbing medicine) is injected with a very small needle into the skin area to be tested. A small piece of skin is taken from the area. Sometimes a suture (stitch) is used.     What are the risks of a skin biopsy?  I will experience scar, bleeding, swelling, pain, crusting and redness. I may experience incomplete removal or recurrence. Risks of this procedure are excessive bleeding, bruising, infection, nerve damage, numbness, thick (hypertrophic or keloidal) scar and non-diagnostic biopsy.    How should I care for my wound for the first 24 hours?  Keep the wound dry and covered for 24 hours  If it bleeds, hold direct pressure on the area for 15 minutes. If bleeding does not stop, call us or go to the emergency room  Avoid strenuous exercise the first 1-2 days or as your doctor instructs you    How should I care for the wound after 24 hours?  After 24 hours, remove the bandage  You may bathe or shower as normal  If you had a scalp biopsy, you can shampoo as usual and can use shower water to clean the biopsy site daily  Clean the wound once a day with gentle soap and water  Do not scrub, be gentle  Apply white petroleum/Vaseline after cleaning the wound with a cotton swab or a clean finger, and keep the site covered with a Bandaid /bandage. Bandages are not necessary with a scalp biopsy  If you are unable to cover the site with a Bandaid /bandage, re-apply ointment 2-3 times a day to keep the site moist. Moisture will help with healing  Avoid strenuous activity for first 1-2 days  Avoid lakes, rivers, pools, and oceans until the stitches are removed or the site is healed    How do I clean my wound?  Wash hands thoroughly with soap or use hand  before all wound care  Clean  the wound with gentle soap and water  Apply white petroleum/Vaseline  to wound after it is clean  Replace the Bandaid /bandage to keep the wound covered for the first few days or as instructed by your doctor  If you had a scalp biopsy, warm shower water to the area on a daily basis should suffice    What should I use to clean my wound?   Cotton-tipped applicators (Qtips )  White petroleum jelly (Vaseline ). Use a clean new container and use Q-tips to apply.  Bandaids  as needed  Gentle soap     How should I care for my wound long term?  Do not get your wound dirty  Keep up with wound care for one week or until the area is healed.  If you have stitches, stitches need to be removed in 14 days. You may return to our clinic for this or you may have it done locally at your doctor s office.  A small scab will form and fall off by itself when the area is completely healed. The area will be red and will become pink in color as it heals. Sun protection is very important for how your scar will turn out. Sunscreen with an SPF 30 or greater is recommended once the area is healed.  You should have some soreness but it should be mild and slowly go away over several days. Talk to your doctor about using tylenol for pain,    When should I call my doctor?  If you have increased:   Pain or swelling  Pus or drainage (clear or slightly yellow drainage is ok)  Temperature over 100F  Spreading redness or warmth around wound    When will I hear about my results?  The biopsy results can take 2 weeks to come back.  Your results will automatically release to [employer] before your provider has even reviewed them.  The clinic will call you with the results, send you a [employer] message, or have you schedule a follow-up clinic or phone time to discuss the results.  Contact our clinics if you do not hear from us in 2 weeks.    Who should I call with questions?  [facility]: [phone]  Medical Center Clinic  [facility]: [phone]  For urgent needs outside of business hours call the [facility] at [phone] and ask for the dermatology resident on call

## 2023-09-12 NOTE — NURSING NOTE
Dermatology Rooming Note    Nicci Pemberton's goals for this visit include:   Chief Complaint   Patient presents with    Skin Check     Here today for a skin check. Spot on lip to check.      Alethea Reeves RN

## 2023-09-27 ENCOUNTER — TELEPHONE (OUTPATIENT)
Dept: TRANSPLANT | Facility: CLINIC | Age: 63
End: 2023-09-27
Payer: COMMERCIAL

## 2023-09-27 ASSESSMENT — ENCOUNTER SYMPTOMS: NEW SYMPTOMS OF CORONARY ARTERY DISEASE: 0

## 2023-09-27 NOTE — TELEPHONE ENCOUNTER
Call placed to patient for Annual [employer] Patient Status Update -     Questions asked are standardized questions that the [employer] requires all transplant centers to report back to the [employer].  [employer] requests this information for all transplant recipients (kidney, liver, heart, lung, pancreas, etc).  [employer] utilizes this information to best understand recipient outcomes and equitable distribution of organs.       Patient status form completed.

## 2023-10-17 ENCOUNTER — TELEPHONE (OUTPATIENT)
Dept: TRANSPLANT | Facility: CLINIC | Age: 63
End: 2023-10-17
Payer: COMMERCIAL

## 2023-10-17 ASSESSMENT — ENCOUNTER SYMPTOMS: NEW SYMPTOMS OF CORONARY ARTERY DISEASE: 0

## 2023-10-20 DIAGNOSIS — T86.41 LIVER TRANSPLANT REJECTION (H): ICD-10-CM

## 2023-10-20 DIAGNOSIS — Z94.4 LIVER REPLACED BY TRANSPLANT (H): ICD-10-CM

## 2023-10-20 RX ORDER — MYCOPHENOLATE MOFETIL 250 MG/1
250 CAPSULE ORAL 2 TIMES DAILY
Qty: 180 CAPSULE | Refills: 3 | Status: SHIPPED | OUTPATIENT
Start: 2023-10-20 | End: 2024-02-28

## 2023-10-20 RX ORDER — CYCLOSPORINE 25 MG/1
CAPSULE, LIQUID FILLED ORAL
Qty: 450 CAPSULE | Refills: 3 | Status: SHIPPED | OUTPATIENT
Start: 2023-10-20 | End: 2024-01-15

## 2023-10-31 DIAGNOSIS — T86.41 LIVER TRANSPLANT REJECTION (H): Primary | ICD-10-CM

## 2023-11-07 ENCOUNTER — LAB (OUTPATIENT)
Dept: LAB | Facility: CLINIC | Age: 63
End: 2023-11-07
Payer: COMMERCIAL

## 2023-11-07 DIAGNOSIS — Z94.4 LIVER REPLACED BY TRANSPLANT (H): ICD-10-CM

## 2023-11-07 LAB
ALBUMIN SERPL BCG-MCNC: 3.9 G/DL (ref 3.5–5.2)
ALP SERPL-CCNC: 123 U/L (ref 35–104)
ALT SERPL W P-5'-P-CCNC: 17 U/L (ref 0–50)
ANION GAP SERPL CALCULATED.3IONS-SCNC: 9 MMOL/L (ref 7–15)
AST SERPL W P-5'-P-CCNC: 34 U/L (ref 0–45)
BILIRUB DIRECT SERPL-MCNC: 0.21 MG/DL (ref 0–0.3)
BILIRUB SERPL-MCNC: 1.3 MG/DL
BUN SERPL-MCNC: 25.6 MG/DL (ref 8–23)
CALCIUM SERPL-MCNC: 9.5 MG/DL (ref 8.8–10.2)
CHLORIDE SERPL-SCNC: 106 MMOL/L (ref 98–107)
CREAT SERPL-MCNC: 1.12 MG/DL (ref 0.51–0.95)
CYCLOSPORINE BLD LC/MS/MS-MCNC: 94 UG/L (ref 50–400)
DEPRECATED HCO3 PLAS-SCNC: 24 MMOL/L (ref 22–29)
EGFRCR SERPLBLD CKD-EPI 2021: 55 ML/MIN/1.73M2
ERYTHROCYTE [DISTWIDTH] IN BLOOD BY AUTOMATED COUNT: 12.2 % (ref 10–15)
GLUCOSE SERPL-MCNC: 93 MG/DL (ref 70–99)
HCT VFR BLD AUTO: 39.4 % (ref 35–47)
HGB BLD-MCNC: 13.2 G/DL (ref 11.7–15.7)
MCH RBC QN AUTO: 33.7 PG (ref 26.5–33)
MCHC RBC AUTO-ENTMCNC: 33.5 G/DL (ref 31.5–36.5)
MCV RBC AUTO: 101 FL (ref 78–100)
PLATELET # BLD AUTO: 159 10E3/UL (ref 150–450)
POTASSIUM SERPL-SCNC: 5.1 MMOL/L (ref 3.4–5.3)
PROT SERPL-MCNC: 7.3 G/DL (ref 6.4–8.3)
RBC # BLD AUTO: 3.92 10E6/UL (ref 3.8–5.2)
SODIUM SERPL-SCNC: 139 MMOL/L (ref 135–145)
TME LAST DOSE: NORMAL H
TME LAST DOSE: NORMAL H
WBC # BLD AUTO: 5 10E3/UL (ref 4–11)

## 2023-11-07 PROCEDURE — 36415 COLL VENOUS BLD VENIPUNCTURE: CPT

## 2023-11-07 PROCEDURE — 85027 COMPLETE CBC AUTOMATED: CPT

## 2023-11-07 PROCEDURE — 80158 DRUG ASSAY CYCLOSPORINE: CPT

## 2023-11-07 PROCEDURE — 80053 COMPREHEN METABOLIC PANEL: CPT

## 2023-11-07 PROCEDURE — 82248 BILIRUBIN DIRECT: CPT

## 2023-11-20 ENCOUNTER — LAB REQUISITION (OUTPATIENT)
Dept: LAB | Facility: CLINIC | Age: 63
End: 2023-11-20

## 2023-11-20 DIAGNOSIS — Z01.419 ENCOUNTER FOR GYNECOLOGICAL EXAMINATION (GENERAL) (ROUTINE) WITHOUT ABNORMAL FINDINGS: ICD-10-CM

## 2023-11-20 PROCEDURE — G0145 SCR C/V CYTO,THINLAYER,RESCR: HCPCS | Performed by: OBSTETRICS & GYNECOLOGY

## 2023-11-20 PROCEDURE — 87624 HPV HI-RISK TYP POOLED RSLT: CPT | Performed by: OBSTETRICS & GYNECOLOGY

## 2023-11-24 LAB
BKR LAB AP GYN ADEQUACY: NORMAL
BKR LAB AP GYN INTERPRETATION: NORMAL
BKR LAB AP HPV REFLEX: NORMAL
BKR LAB AP LMP: NORMAL
BKR LAB AP PREVIOUS ABNL DX: NORMAL
BKR LAB AP PREVIOUS ABNORMAL: NORMAL
PATH REPORT.COMMENTS IMP SPEC: NORMAL
PATH REPORT.COMMENTS IMP SPEC: NORMAL
PATH REPORT.RELEVANT HX SPEC: NORMAL

## 2023-11-27 LAB
HUMAN PAPILLOMA VIRUS 16 DNA: NEGATIVE
HUMAN PAPILLOMA VIRUS 18 DNA: NEGATIVE
HUMAN PAPILLOMA VIRUS FINAL DIAGNOSIS: NORMAL
HUMAN PAPILLOMA VIRUS OTHER HR: NEGATIVE

## 2023-11-28 ENCOUNTER — LAB (OUTPATIENT)
Dept: LAB | Facility: CLINIC | Age: 63
End: 2023-11-28
Payer: COMMERCIAL

## 2023-11-28 DIAGNOSIS — T86.41 LIVER TRANSPLANT REJECTION (H): ICD-10-CM

## 2023-11-28 LAB
ERYTHROCYTE [DISTWIDTH] IN BLOOD BY AUTOMATED COUNT: 12.1 % (ref 10–15)
HCT VFR BLD AUTO: 38.4 % (ref 35–47)
HGB BLD-MCNC: 13.1 G/DL (ref 11.7–15.7)
MCH RBC QN AUTO: 34.4 PG (ref 26.5–33)
MCHC RBC AUTO-ENTMCNC: 34.1 G/DL (ref 31.5–36.5)
MCV RBC AUTO: 101 FL (ref 78–100)
PLATELET # BLD AUTO: 156 10E3/UL (ref 150–450)
RBC # BLD AUTO: 3.81 10E6/UL (ref 3.8–5.2)
WBC # BLD AUTO: 6 10E3/UL (ref 4–11)

## 2023-11-28 PROCEDURE — 85027 COMPLETE CBC AUTOMATED: CPT

## 2023-11-28 PROCEDURE — 36415 COLL VENOUS BLD VENIPUNCTURE: CPT

## 2024-01-15 DIAGNOSIS — Z94.4 LIVER REPLACED BY TRANSPLANT (H): ICD-10-CM

## 2024-01-15 RX ORDER — CYCLOSPORINE 25 MG/1
CAPSULE, LIQUID FILLED ORAL
Qty: 150 CAPSULE | Refills: 11 | Status: SHIPPED | OUTPATIENT
Start: 2024-01-15 | End: 2024-01-24

## 2024-01-24 DIAGNOSIS — Z94.4 LIVER REPLACED BY TRANSPLANT (H): ICD-10-CM

## 2024-01-24 RX ORDER — CYCLOSPORINE 25 MG/1
CAPSULE, LIQUID FILLED ORAL
Qty: 450 CAPSULE | Refills: 3 | Status: SHIPPED | OUTPATIENT
Start: 2024-01-24

## 2024-02-01 ENCOUNTER — TELEPHONE (OUTPATIENT)
Dept: TRANSPLANT | Facility: CLINIC | Age: 64
End: 2024-02-01
Payer: COMMERCIAL

## 2024-02-01 DIAGNOSIS — Z94.4 LIVER REPLACED BY TRANSPLANT (H): Primary | ICD-10-CM

## 2024-02-01 DIAGNOSIS — Z13.220 LIPID SCREENING: ICD-10-CM

## 2024-02-01 NOTE — TELEPHONE ENCOUNTER
Pt calls to state that she is due for labs and would like them updated. Writer updated lab orders.

## 2024-02-01 NOTE — LETTER
OUTPATIENT LABORATORY TEST ORDER   Patient copy    Patient Name: Nicci Pemberton   YOB: 1960     [facility] ED MR# [if applicable]: [id number]   Date & Time: February 1, 2024  3:43 PM  Expiration Date: 1 year after date issued     Diagnosis: Liver Transplant (ICD-10 Z94.4)   Aftercare following organ transplant (ICD-10 Z48.288)   Long term use of medications (ICD-10 Z79.899)      We ask your assistance in obtaining the following laboratory tests, which are part of our routine surveillance program for Solid Organ Transplant patients.     Please fax each result to [phone], same day as resulted/available    Critical lab results page [phone]    >Every 6-months post-transplant  Magnesium with next lab draw    >1-year post-transplant  Every 2-3 months  CBC with Platelets   Basic Metabolic Panel   Hepatic panel   Cyclosporine drug level - 12 hour trough, please document time of last dose        >1-year post-transplant  Labs annually due February  Fasting Lipid Panel  Urine protein/creatinine ratio  UA with reflex to micro  Phosphorous    If you have any questions, please call [facility]- [phone] or [phone], Fax- [phone].      Thomas M. Leventhal, M.D.   of Medicine  Advanced & Transplant Hepatology  [facility]

## 2024-02-20 ENCOUNTER — LAB (OUTPATIENT)
Dept: LAB | Facility: CLINIC | Age: 64
End: 2024-02-20
Payer: COMMERCIAL

## 2024-02-20 DIAGNOSIS — Z13.220 LIPID SCREENING: ICD-10-CM

## 2024-02-20 DIAGNOSIS — E03.9 MYXEDEMA HEART DISEASE: Primary | ICD-10-CM

## 2024-02-20 DIAGNOSIS — I51.9 MYXEDEMA HEART DISEASE: Primary | ICD-10-CM

## 2024-02-20 DIAGNOSIS — Z94.4 LIVER REPLACED BY TRANSPLANT (H): ICD-10-CM

## 2024-02-20 LAB
ALBUMIN MFR UR ELPH: 6.3 MG/DL
ALBUMIN SERPL BCG-MCNC: 4 G/DL (ref 3.5–5.2)
ALBUMIN UR-MCNC: NEGATIVE MG/DL
ALP SERPL-CCNC: 90 U/L (ref 40–150)
ALT SERPL W P-5'-P-CCNC: 17 U/L (ref 0–50)
ANION GAP SERPL CALCULATED.3IONS-SCNC: 11 MMOL/L (ref 7–15)
APPEARANCE UR: CLEAR
AST SERPL W P-5'-P-CCNC: 31 U/L (ref 0–45)
BACTERIA #/AREA URNS HPF: ABNORMAL /HPF
BILIRUB DIRECT SERPL-MCNC: 0.35 MG/DL (ref 0–0.3)
BILIRUB SERPL-MCNC: 2.1 MG/DL
BILIRUB UR QL STRIP: NEGATIVE
BUN SERPL-MCNC: 28.7 MG/DL (ref 8–23)
CALCIUM SERPL-MCNC: 9.7 MG/DL (ref 8.8–10.2)
CHLORIDE SERPL-SCNC: 105 MMOL/L (ref 98–107)
CHOLEST SERPL-MCNC: 206 MG/DL
COLOR UR AUTO: YELLOW
CREAT SERPL-MCNC: 0.99 MG/DL (ref 0.51–0.95)
CREAT UR-MCNC: 94 MG/DL
CYCLOSPORINE BLD LC/MS/MS-MCNC: 105 UG/L (ref 50–400)
DEPRECATED HCO3 PLAS-SCNC: 22 MMOL/L (ref 22–29)
EGFRCR SERPLBLD CKD-EPI 2021: 64 ML/MIN/1.73M2
ERYTHROCYTE [DISTWIDTH] IN BLOOD BY AUTOMATED COUNT: 12 % (ref 10–15)
FASTING STATUS PATIENT QL REPORTED: YES
GLUCOSE SERPL-MCNC: 87 MG/DL (ref 70–99)
GLUCOSE UR STRIP-MCNC: NEGATIVE MG/DL
HCT VFR BLD AUTO: 37.9 % (ref 35–47)
HDLC SERPL-MCNC: 54 MG/DL
HGB BLD-MCNC: 13.2 G/DL (ref 11.7–15.7)
HGB UR QL STRIP: ABNORMAL
KETONES UR STRIP-MCNC: NEGATIVE MG/DL
LDLC SERPL CALC-MCNC: 137 MG/DL
LEUKOCYTE ESTERASE UR QL STRIP: ABNORMAL
MAGNESIUM SERPL-MCNC: 1.7 MG/DL (ref 1.7–2.3)
MCH RBC QN AUTO: 33.8 PG (ref 26.5–33)
MCHC RBC AUTO-ENTMCNC: 34.8 G/DL (ref 31.5–36.5)
MCV RBC AUTO: 97 FL (ref 78–100)
NITRATE UR QL: NEGATIVE
NONHDLC SERPL-MCNC: 152 MG/DL
PH UR STRIP: 5.5 [PH] (ref 5–7)
PHOSPHATE SERPL-MCNC: 3.5 MG/DL (ref 2.5–4.5)
PLATELET # BLD AUTO: 148 10E3/UL (ref 150–450)
POTASSIUM SERPL-SCNC: 4.4 MMOL/L (ref 3.4–5.3)
PROT SERPL-MCNC: 7.1 G/DL (ref 6.4–8.3)
PROT/CREAT 24H UR: 0.07 MG/MG CR (ref 0–0.2)
RBC # BLD AUTO: 3.9 10E6/UL (ref 3.8–5.2)
RBC #/AREA URNS AUTO: ABNORMAL /HPF
SODIUM SERPL-SCNC: 138 MMOL/L (ref 135–145)
SP GR UR STRIP: 1.02 (ref 1–1.03)
SQUAMOUS #/AREA URNS AUTO: ABNORMAL /LPF
TME LAST DOSE: NORMAL H
TME LAST DOSE: NORMAL H
TRIGL SERPL-MCNC: 76 MG/DL
TSH SERPL DL<=0.005 MIU/L-ACNC: 0.09 UIU/ML (ref 0.3–4.2)
UROBILINOGEN UR STRIP-ACNC: 0.2 E.U./DL
WBC # BLD AUTO: 4.5 10E3/UL (ref 4–11)
WBC #/AREA URNS AUTO: ABNORMAL /HPF

## 2024-02-20 PROCEDURE — 80158 DRUG ASSAY CYCLOSPORINE: CPT

## 2024-02-20 PROCEDURE — 82248 BILIRUBIN DIRECT: CPT

## 2024-02-20 PROCEDURE — 81001 URINALYSIS AUTO W/SCOPE: CPT

## 2024-02-20 PROCEDURE — 84156 ASSAY OF PROTEIN URINE: CPT

## 2024-02-20 PROCEDURE — 84100 ASSAY OF PHOSPHORUS: CPT

## 2024-02-20 PROCEDURE — 80053 COMPREHEN METABOLIC PANEL: CPT

## 2024-02-20 PROCEDURE — 85027 COMPLETE CBC AUTOMATED: CPT

## 2024-02-20 PROCEDURE — 84443 ASSAY THYROID STIM HORMONE: CPT

## 2024-02-20 PROCEDURE — 83735 ASSAY OF MAGNESIUM: CPT

## 2024-02-20 PROCEDURE — 80061 LIPID PANEL: CPT

## 2024-02-20 PROCEDURE — 36415 COLL VENOUS BLD VENIPUNCTURE: CPT

## 2024-02-22 ENCOUNTER — OFFICE VISIT (OUTPATIENT)
Dept: GASTROENTEROLOGY | Facility: CLINIC | Age: 64
End: 2024-02-22
Attending: INTERNAL MEDICINE
Payer: COMMERCIAL

## 2024-02-22 VITALS
HEIGHT: 62 IN | HEART RATE: 99 BPM | DIASTOLIC BLOOD PRESSURE: 85 MMHG | WEIGHT: 231 LBS | SYSTOLIC BLOOD PRESSURE: 123 MMHG | BODY MASS INDEX: 42.51 KG/M2

## 2024-02-22 DIAGNOSIS — N18.30 STAGE 3 CHRONIC KIDNEY DISEASE, UNSPECIFIED WHETHER STAGE 3A OR 3B CKD (H): ICD-10-CM

## 2024-02-22 DIAGNOSIS — T86.41 LIVER TRANSPLANT REJECTION (H): ICD-10-CM

## 2024-02-22 DIAGNOSIS — K76.0 FATTY LIVER DISEASE, NONALCOHOLIC: ICD-10-CM

## 2024-02-22 DIAGNOSIS — E66.01 MORBID OBESITY (H): ICD-10-CM

## 2024-02-22 DIAGNOSIS — Z94.4 STATUS POST LIVER TRANSPLANTATION (H): Primary | Chronic | ICD-10-CM

## 2024-02-22 DIAGNOSIS — D84.9 IMMUNOSUPPRESSED STATUS (H): ICD-10-CM

## 2024-02-22 PROBLEM — K83.1 BILIARY STRICTURE (H): Status: RESOLVED | Noted: 2021-04-02 | Resolved: 2024-02-22

## 2024-02-22 PROBLEM — K83.09 CHOLANGITIS (H): Status: RESOLVED | Noted: 2020-03-04 | Resolved: 2024-02-22

## 2024-02-22 PROCEDURE — 99215 OFFICE O/P EST HI 40 MIN: CPT | Performed by: INTERNAL MEDICINE

## 2024-02-22 PROCEDURE — 99213 OFFICE O/P EST LOW 20 MIN: CPT | Performed by: INTERNAL MEDICINE

## 2024-02-22 PROCEDURE — G2211 COMPLEX E/M VISIT ADD ON: HCPCS | Performed by: INTERNAL MEDICINE

## 2024-02-22 ASSESSMENT — PAIN SCALES - GENERAL: PAINLEVEL: NO PAIN (0)

## 2024-02-22 NOTE — NURSING NOTE
"Chief Complaint   Patient presents with    RECHECK     Follow up with liver transplant     /85   Pulse 99   Ht 1.575 m (5' 2\")   Wt 104.8 kg (231 lb)   BMI 42.25 kg/m    Sarah Steven, Lead CMA  2/22/2024 11:03 AM    "

## 2024-02-22 NOTE — Clinical Note
- can go off MMF - Should start simvastatin 20 (her primary is pushing down the road and he can just take over in the future - I placed a Comp Weight Mgmt clinic referral - Needs a DEXA

## 2024-02-22 NOTE — PROGRESS NOTES
Date of Service: 2024     Subjective:          Nicci Pemberton is a 63 year old female presenting for follow up with history of liver transplantation    History of Present Illness   Nicci Pemberton is a 63 year old female with past medical history of obesity, lymphedema, and cirrhosis secondary to combination of nonalcoholic fatty liver disease, iron overload, and alpha-1 antitrypsin MZ phenotype who is s/p  donor liver transplant on 19 and presents in follow up.  Noted that she had an episode of biopsy-proven moderate ACR in 3/2021.     Since last seen she does have a few issues.  She notes despite dietary changes and modifications she has had a challenge losing weight.  She does have significant ongoing issues with arthritis of her lower extremities which she believes has limited her ability to participate in ongoing activity.    We discussed in detail that based on her current labs she has CKD stage II and ways this can be managed    We did discuss her dyslipidemia in depth and given her risk for having metabolic associated liver disease with fat deposition in the allograft, our recommendations for aggressive management of serum LDL.    She is currently on immunosuppression regimen of cyclosporine twice daily and MMF twice daily.  Important note that she is on MMF secondary to an episode of moderate acute rejection in 2021.  Her liver tests have remained essentially unchanged since the initial treatment    Surgical Course  - OLT date: 2019   - etiology: ANGULO/alpha 1  - donor: type DBD  - Induction IS: basiliximab  - CMV status D+/R+  - operation: Surgical technique caval replacement, biliary anastomosis: duct to duct  - CiT 335min, WiT 35min  - Episodes of Rejection:  moderate acute cellular rejection (MARVIN 5/9) noted on biopsy from 2021  - Biliary complications: high grade stricture s/p multiple ERCPs (last 2021)  - Other complications of immediate post-operative  course: delerium with d/c of tacrolimus and start of cyclosporin    Past Medical History:  Past Medical History:   Diagnosis Date    Anemia     Cellulitis     Cirrhosis of liver (H) 06/18/2018    Gastroesophageal reflux disease     Heterozygous alpha 1-antitrypsin deficiency (H)     High hepatic iron concentration determined by biopsy of liver     Incisional infection 11/4/2021    Left TKA incision    Liver cirrhosis secondary to ANGULO (H)     Liver transplanted (H) 08/18/2019    Lymphedema     Osteoarthritis     knees    Other chronic pain     Left knee    SBP (spontaneous bacterial peritonitis) (H) 08/02/2019 8/1/19 in [facility]    Thrombocytopenia (H24) 11/2020    Thyroid disease     Hypothyroid       Past Surgical History:  Past Surgical History:   Procedure Laterality Date    ARTHROPLASTY KNEE Right 10/23/2020    Procedure: Right  total knee arthroplasty;  Surgeon: Mario Wallace MD;  [facility]    ARTHROPLASTY KNEE Left 9/24/2021    Procedure: Left total knee arthroplasty;  Surgeon: Mario Wallace MD;  [facility]    BENCH LIVER N/A 8/18/2019    Procedure: BACKBENCH PREPARATION, LIVER;  Surgeon: Mandeep Alford MD;  [facility]    COLONOSCOPY N/A 6/22/2018    Procedure: COLONOSCOPY;  colonoscopy;  Surgeon: Hugo Patel MD;  [facility]    ENDOSCOPIC RETROGRADE CHOLANGIOPANCREATOGRAM N/A 2/10/2020    Procedure: ENDOSCOPIC RETROGRADE CHOLANGIOPANCREATOGRAPHY with spinchterotomy;  Surgeon: Guru Rigoberto Canada MD;  [facility]    ENDOSCOPIC RETROGRADE CHOLANGIOPANCREATOGRAM N/A 5/13/2020    Procedure: ENDOSCOPIC RETROGRADE CHOLANGIOPANCREATOGRAPHY,Stent exchange and sludge removal;  Surgeon: Guru Rigoberto Canada MD;  [facility]    ENDOSCOPIC RETROGRADE CHOLANGIOPANCREATOGRAM N/A 2/24/2021    Procedure: ENDOSCOPIC RETROGRADE CHOLANGIOPANCREATOGRAPHY, SPINCTEROTOMY, DEBRIDE REMOVAL, STENT PLACEMENT;  Surgeon: Guru Rigoberto Canada,  MD;  [facility]    ENDOSCOPIC RETROGRADE CHOLANGIOPANCREATOGRAPHY, EXCHANGE TUBE/STENT N/A 3/4/2020    Procedure: ENDOSCOPIC RETROGRADE CHOLANGIOPANCREATOGRAPHY, WITH biliary dilarion, stent exchange, stone removal;  Surgeon: Guru Rigoberto Canada MD;  [facility]    ENDOSCOPIC RETROGRADE CHOLANGIOPANCREATOGRAPHY, EXCHANGE TUBE/STENT N/A 2021    Procedure: ENDOSCOPIC RETROGRADE CHOLANGIOPANCREATOGRAPHY WITH BILIARY STENT EXCHANGE, DILATION AND SLUDGE/STONE REMOVAL;  Surgeon: Guru Rigoberto Canada MD;  [facility]    ESOPHAGOSCOPY, GASTROSCOPY, DUODENOSCOPY (EGD), COMBINED N/A 10/31/2019    Procedure: Esophagogastroduodenoscopy, With Biopsy;  Surgeon: Nerissa Aranda MD;  [facility]    IR FEEDING TUBE PLACEMENT W FLUORO/MD  2019    IR FLUORO 0-1 HOUR  2019    IR LIVER BIOPSY PERCUTANEOUS  3/25/2021    IR LUMBAR PUNCTURE  2019    IRRIGATION AND DEBRIDEMENT LOWER EXTREMITY, COMBINED Left 11/10/2021    Procedure: Irrigation and debridement Left knee skin subcutaneous tissue (length: 10cm), Application of wound vac;  Surgeon: Mario Wallace MD;  [facility]    KNEE SURGERY  10/23/20    DC STOMACH SURGERY PROCEDURE UNLISTED  Liver transplant 19    TRANSPLANT LIVER RECIPIENT  DONOR N/A 2019    Procedure: TRANSPLANT, LIVER, RECIPIENT,  DONOR;  Surgeon: Mandeep Alford MD;  [facility]    US PARACENTESIS  2019    US PARACENTESIS WITH ALBUMIN  2019       Past Social History:  Social History     Tobacco Use    Smoking status: Former     Packs/day: 0.50     Years: 10.00     Additional pack years: 0.00     Total pack years: 5.00     Types: Cigarettes     Start date: 2000     Quit date:      Years since quittin.3    Smokeless tobacco: Never   Substance Use Topics    Alcohol use: No     Comment: due to liver transplant,     Drug use: No        Family History:  Family History   Problem  "Relation Age of Onset    Diabetes Maternal Grandfather         Diagnosed at age 83    Mental Illness Maternal Grandfather     No Known Problems Mother     Restless Leg Syndrome Father     Mental Illness Sister     Alcoholism Brother     No Known Problems Son     Heart Failure Paternal Grandmother     No Known Problems Son     No Known Problems Maternal Grandmother     Breast Cancer Paternal Aunt     Cirrhosis No family hx of     Liver Cancer No family hx of        Review of Systems  A complete 10 point review of systems was asked and answered in the negative unless specifically commented upon in the HPI    Current Outpatient Medications   Medication    acetaminophen (TYLENOL) 325 MG tablet    Cholecalciferol (VITAMIN D-3) 25 MCG (1000 UT) CAPS    COMPRESSION STOCKINGS    cycloSPORINE modified (GENERIC EQUIVALENT) 25 MG capsule    famotidine (PEPCID) 20 MG tablet    levothyroxine (SYNTHROID/LEVOTHROID) 125 MCG tablet    mycophenolate (GENERIC EQUIVALENT) 250 MG capsule     No current facility-administered medications for this visit.       Objective:         Vitals:    02/22/24 1102   BP: 123/85   Pulse: 99   Weight: 104.8 kg (231 lb)   Height: 1.575 m (5' 2\")     Body mass index is 42.25 kg/m .     Physical Exam    Vitals reviewed.   Constitutional: Well-developed, well-nourished, in no apparent distress.    HEENT: Normocephalic.   Neck/Lymph: Normal ROM, supple.  Respiratory: Normal respiratory excursion   GI:  Abdomen soft, obese  Skin:  Skin is warm and dry. No rash noted.  Musculoskeletal:  ROM intact, normal muscle bulk    Psychiatric: Normal mood and affect. Behavior is normal.  Neuro: no tremor, A&Ox3    Labs and Diagnostic tests:  CBC w/Diff    Lab Results   Component Value Date/Time    WBC 4.5 02/20/2024 10:28 AM    WBC 6.6 07/09/2021 10:18 AM    RBC 3.90 02/20/2024 10:28 AM    RBC 3.64 (L) 07/09/2021 10:18 AM    HGB 13.2 02/20/2024 10:28 AM    HGB 12.6 07/09/2021 10:18 AM    HCT 37.9 02/20/2024 10:28 AM    " HCT 37.5 07/09/2021 10:18 AM    MCV 97 02/20/2024 10:28 AM     (H) 07/09/2021 10:18 AM    MCH 33.8 (H) 02/20/2024 10:28 AM    MCH 34.6 (H) 07/09/2021 10:18 AM    MCHC 34.8 02/20/2024 10:28 AM    MCHC 33.6 07/09/2021 10:18 AM    RDW 12.0 02/20/2024 10:28 AM    RDW 12.2 07/09/2021 10:18 AM         Comprehensive Metabolic Profile    Lab Results   Component Value Date/Time     02/20/2024 10:28 AM     07/09/2021 10:18 AM    CO2 22 02/20/2024 10:28 AM    CO2 29 07/06/2022 10:26 AM    CO2 26 07/09/2021 10:18 AM    BUN 28.7 (H) 02/20/2024 10:28 AM    BUN 22 07/06/2022 10:26 AM    BUN 25 07/09/2021 10:18 AM    CR 0.99 (H) 02/20/2024 10:28 AM    CR 1.13 (H) 07/09/2021 10:18 AM    Lab Results   Component Value Date/Time    ALBUMIN 4.0 02/20/2024 10:28 AM    ALBUMIN 3.4 08/23/2022 09:51 AM    ALBUMIN 3.5 07/09/2021 10:18 AM    ALKPHOS 90 02/20/2024 10:28 AM    ALKPHOS 112 07/09/2021 10:18 AM    AST 31 02/20/2024 10:28 AM    AST 43 07/09/2021 10:18 AM    ALT 17 02/20/2024 10:28 AM    ALT 35 07/09/2021 10:18 AM          Assessment and Plan:    S/P Liver Transplantation:   - 8/18/2019 with DBD donor for ANGULO/A1AT/iron overload  - Bi-caval with duct to duct  - Episode of moderate ACR 3/21; treated  - History of biliary stricture; last ERCP 5/2021  -ALT, AST, alkaline phosphatase have improved, noted that she has a persistently elevated total bilirubin that is an indirect hyperbilirubinemia: 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      - Recommend exercise regularly: 4+ times per week, with an average of about 40-45 minutes per day.      - It has shown that patients who exercise regularly can have improvement of insulin resistance and resolution of fatty liver disease, even if they are not able to lose weight.     - Recommend a low-carbohydrate,  "low-calorie diet (3128-5704 calories per day).    - An ideal weight loss plan would be to lose 7-10% of body weight over the next six months  - Management of cholesterol is also very important.    - The use of \"statins\" (HMG-CoA reductase medications) are an effective means of therapy and are not contra-indicated in those with abnormal liver tests OR those with cirrhosis.  The value of these medications in this population far outweigh the minor risks of abnormal liver tests.   - Goal LDL in those with ANGULO are < 100 mg/dL  - She should be started on a statin at this time  - Consider the utility of liberalizing coffee consumption as some data that this may slow progression and reverse effects of ANGULO-related fibrosis.  -We will refer her to the comprehensive weight management clinic here at the [facility] given her longstanding issues and inability to lose further weight.  We did discuss lifestyle modifications, medications, as well as surgical interventions all is being safe and appropriate to help her with the assertive weight loss that she needs    Immunosuppression:   - Was transitioned to cyclosporin from tacrolimus for concerns of delerium  - Episode of moderate ACR 3/2021  - Currently on cyclosporine 75mg BID with goal  and MMF 250mg BID  -We will plan to stop the MMF at this time given she is now more than 2 and half months out from her last episode of rejection  - Will plan to check immunosuppression trough levels per protocol to assess for adequate target dosing and will assess CBC and kidney function for toxicity of medications    Kidney Health:   - has developed mild CKD stage II/III  - Will continue to minimize CNI as able    Nutrition/Weight:    It is very common for patients to put on significant weight after liver transplantation, as they become conditioned to eating as much as possible to maintain a healthy weight pre-transplant.  There is a very significant risk of developing fatty liver and " non-alcoholic steatohepatitis in post-transplant patients, even in those who were not transplanted for ANGULO cirrhosis.  - Please work on eating a diet that is rice in fresh fruits and vegetables, moderate amounts of lean protein and dairy, and modest amounts of carbohydrates and fats.  - An exercise plan/regimen is an essential part of remaining healthy in the post-transplant setting and we recommend 30-35 minutes of exercise at least 4 days a week    Routine Health Care:  - You need to establish and maintain care with a primary care physician to manage: hypertension, high cholesterol, abnormal blood sugars - as these are all potential complications of your health after liver transplantation and will need a local physician to manage these issues.  - All patients with liver diseaes (even post transplant) are at an increased risk for osteoporosis.  We strongly recommend screening for Vitamin D deficiency at least twice yearly with aggressive supplementation/replacement as indicated.    - We also recommend a screening DEXA scan to evaluate for osteoporosis.  If present, should treat with calcium, Vitamin D supplementation, and recommend consideration of bisphosphonate therapy.  Also recommend follow up DEXA scans to evaluate for improvement of bone density on therapy.  - Recommend yearly skin exams with dermatology, and if skin cancers are found, recommend twice yearly exams  - Recommend you stay up to date for cancer screening: mammograms/PAP for women and prostate cancer screening for men, and colon cancer screening for all.  - Practice good hygiene with washing of hands and maintaining a clean living space as this will decrease your chances of developing an infection in the post-transplantation setting  - Eat a health diet: rich in fresh fruits and vegetables, lean proteins, and minimize carbohydrate and fat intake.      Follow up: 12 months     Thank you very much for the opportunity to participate in the care of  this patient.  If you have any further questions, please don't hesitate to contact our office.    __________________________________  Thomas M. Leventhal, M.D.   of Medicine  Advanced & Transplant Hepatology  [facility]    I spent 40 minutes on the date of the encounter doing chart review, history and exam, documentation and further activities as noted above.      The longitudinal plan of care for liver transplantation and immunosuppression management (and possible complications) was addressed during this visit. Due to the added complexity in care, I will continue to support this patient in the subsequent management of this condition(s) and with the ongoing continuity of care of this condition

## 2024-02-22 NOTE — LETTER
2024         RE: Nicci Pemberton  [id number] [address] [facility] [address]        Dear Colleague,    Thank you for referring your patient, Nicci Pemberton, to the [facility] [place]. Please see a copy of my visit note below.    Date of Service: 2024     Subjective:          Nicci Pemberton is a 63 year old female presenting for follow up with history of liver transplantation    History of Present Illness   Nicci Pemberton is a 63 year old female with past medical history of obesity, lymphedema, and cirrhosis secondary to combination of nonalcoholic fatty liver disease, iron overload, and alpha-1 antitrypsin MZ phenotype who is s/p  donor liver transplant on 19 and presents in follow up.  Noted that she had an episode of biopsy-proven moderate ACR in 3/2021.     Since last seen she does have a few issues.  She notes despite dietary changes and modifications she has had a challenge losing weight.  She does have significant ongoing issues with arthritis of her lower extremities which she believes has limited her ability to participate in ongoing activity.    We discussed in detail that based on her current labs she has CKD stage II and ways this can be managed    We did discuss her dyslipidemia in depth and given her risk for having metabolic associated liver disease with fat deposition in the allograft, our recommendations for aggressive management of serum LDL.    She is currently on immunosuppression regimen of cyclosporine twice daily and MMF twice daily.  Important note that she is on MMF secondary to an episode of moderate acute rejection in 2021.  Her liver tests have remained essentially unchanged since the initial treatment    Surgical Course  - OLT date: 2019   - etiology: ANGULO/alpha 1  - donor: type DBD  - Induction IS: basiliximab  - CMV status D+/R+  - operation: Surgical technique caval replacement, biliary anastomosis: duct to duct  - CiT  335min, WiT 35min  - Episodes of Rejection:  moderate acute cellular rejection (MARVIN 5/9) noted on biopsy from March 25, 2021  - Biliary complications: high grade stricture s/p multiple ERCPs (last 5/2021)  - Other complications of immediate post-operative course: delerium with d/c of tacrolimus and start of cyclosporin    Past Medical History:  Past Medical History:   Diagnosis Date    Anemia     Cellulitis     Cirrhosis of liver (H) 06/18/2018    Gastroesophageal reflux disease     Heterozygous alpha 1-antitrypsin deficiency (H)     High hepatic iron concentration determined by biopsy of liver     Incisional infection 11/4/2021    Left TKA incision    Liver cirrhosis secondary to ANGULO (H)     Liver transplanted (H) 08/18/2019    Lymphedema     Osteoarthritis     knees    Other chronic pain     Left knee    SBP (spontaneous bacterial peritonitis) (H) 08/02/2019 8/1/19 in [facility]    Thrombocytopenia (H24) 11/2020    Thyroid disease     Hypothyroid       Past Surgical History:  Past Surgical History:   Procedure Laterality Date    ARTHROPLASTY KNEE Right 10/23/2020    Procedure: Right  total knee arthroplasty;  Surgeon: Mario Wallace MD;  [facility]: [facility]    ARTHROPLASTY KNEE Left 9/24/2021    Procedure: Left total knee arthroplasty;  Surgeon: Mario Wallace MD;  [facility]    BENCH LIVER N/A 8/18/2019    Procedure: BACKBENCH PREPARATION, LIVER;  Surgeon: Mandeep Alford MD;  [facility]    COLONOSCOPY N/A 6/22/2018    Procedure: COLONOSCOPY;  colonoscopy;  Surgeon: Hugo Patel MD;  [facility]    ENDOSCOPIC RETROGRADE CHOLANGIOPANCREATOGRAM N/A 2/10/2020    Procedure: ENDOSCOPIC RETROGRADE CHOLANGIOPANCREATOGRAPHY with spinchterotomy;  Surgeon: Guru Rigoberto Canada MD;  [facility]    ENDOSCOPIC RETROGRADE CHOLANGIOPANCREATOGRAM N/A 5/13/2020    Procedure: ENDOSCOPIC RETROGRADE CHOLANGIOPANCREATOGRAPHY,Stent exchange and sludge removal;  Surgeon: Guru Nancie  Rigoberto Mcqueen MD;  [facility]    ENDOSCOPIC RETROGRADE CHOLANGIOPANCREATOGRAM N/A 2021    Procedure: ENDOSCOPIC RETROGRADE CHOLANGIOPANCREATOGRAPHY, SPINCTEROTOMY, DEBRIDE REMOVAL, STENT PLACEMENT;  Surgeon: Guru Rigoberto Canada MD;  [facility]    ENDOSCOPIC RETROGRADE CHOLANGIOPANCREATOGRAPHY, EXCHANGE TUBE/STENT N/A 3/4/2020    Procedure: ENDOSCOPIC RETROGRADE CHOLANGIOPANCREATOGRAPHY, WITH biliary dilarion, stent exchange, stone removal;  Surgeon: Guru Rigoberto Canada MD;  [facility]    ENDOSCOPIC RETROGRADE CHOLANGIOPANCREATOGRAPHY, EXCHANGE TUBE/STENT N/A 2021    Procedure: ENDOSCOPIC RETROGRADE CHOLANGIOPANCREATOGRAPHY WITH BILIARY STENT EXCHANGE, DILATION AND SLUDGE/STONE REMOVAL;  Surgeon: Guru Rigoberto Canada MD;  [facility]    ESOPHAGOSCOPY, GASTROSCOPY, DUODENOSCOPY (EGD), COMBINED N/A 10/31/2019    Procedure: Esophagogastroduodenoscopy, With Biopsy;  Surgeon: Nerissa Aranda MD;  [facility]    IR FEEDING TUBE PLACEMENT W FLUORO/MD  2019    IR FLUORO 0-1 HOUR  2019    IR LIVER BIOPSY PERCUTANEOUS  3/25/2021    IR LUMBAR PUNCTURE  2019    IRRIGATION AND DEBRIDEMENT LOWER EXTREMITY, COMBINED Left 11/10/2021    Procedure: Irrigation and debridement Left knee skin subcutaneous tissue (length: 10cm), Application of wound vac;  Surgeon: Mario Wallace MD;  [facility]    KNEE SURGERY  10/23/20    [place] STOMACH SURGERY PROCEDURE UNLISTED  Liver transplant 19    TRANSPLANT LIVER RECIPIENT  DONOR N/A 2019    Procedure: TRANSPLANT, LIVER, RECIPIENT,  DONOR;  Surgeon: Mandeep Alford MD;  [facility]    US PARACENTESIS  2019    US PARACENTESIS WITH ALBUMIN  2019       Past Social History:  Social History     Tobacco Use    Smoking status: Former     Packs/day: 0.50     Years: 10.00     Additional pack years: 0.00     Total pack years: 5.00     Types:  "Cigarettes     Start date: 2000     Quit date:      Years since quittin.3    Smokeless tobacco: Never   Substance Use Topics    Alcohol use: No     Comment: due to liver transplant,     Drug use: No        Family History:  Family History   Problem Relation Age of Onset    Diabetes Maternal Grandfather         Diagnosed at age 83    Mental Illness Maternal Grandfather     No Known Problems Mother     Restless Leg Syndrome Father     Mental Illness Sister     Alcoholism Brother     No Known Problems Son     Heart Failure Paternal Grandmother     No Known Problems Son     No Known Problems Maternal Grandmother     Breast Cancer Paternal Aunt     Cirrhosis No family hx of     Liver Cancer No family hx of        Review of Systems  A complete 10 point review of systems was asked and answered in the negative unless specifically commented upon in the HPI    Current Outpatient Medications   Medication    acetaminophen (TYLENOL) 325 MG tablet    Cholecalciferol (VITAMIN D-3) 25 MCG (1000 UT) CAPS    COMPRESSION STOCKINGS    cycloSPORINE modified (GENERIC EQUIVALENT) 25 MG capsule    famotidine (PEPCID) 20 MG tablet    levothyroxine (SYNTHROID/LEVOTHROID) 125 MCG tablet    mycophenolate (GENERIC EQUIVALENT) 250 MG capsule     No current facility-administered medications for this visit.       Objective:         Vitals:    24 1102   BP: 123/85   Pulse: 99   Weight: 104.8 kg (231 lb)   Height: 1.575 m (5' 2\")     Body mass index is 42.25 kg/m .     Physical Exam    Vitals reviewed.   Constitutional: Well-developed, well-nourished, in no apparent distress.    HEENT: Normocephalic.   Neck/Lymph: Normal ROM, supple.  Respiratory: Normal respiratory excursion   GI:  Abdomen soft, obese  Skin:  Skin is warm and dry. No rash noted.  Musculoskeletal:  ROM intact, normal muscle bulk    Psychiatric: Normal mood and affect. Behavior is normal.  Neuro: no tremor, A&Ox3    Labs and Diagnostic tests:  CBC w/Diff    Lab " Results   Component Value Date/Time    WBC 4.5 02/20/2024 10:28 AM    WBC 6.6 07/09/2021 10:18 AM    RBC 3.90 02/20/2024 10:28 AM    RBC 3.64 (L) 07/09/2021 10:18 AM    HGB 13.2 02/20/2024 10:28 AM    HGB 12.6 07/09/2021 10:18 AM    HCT 37.9 02/20/2024 10:28 AM    HCT 37.5 07/09/2021 10:18 AM    MCV 97 02/20/2024 10:28 AM     (H) 07/09/2021 10:18 AM    MCH 33.8 (H) 02/20/2024 10:28 AM    MCH 34.6 (H) 07/09/2021 10:18 AM    MCHC 34.8 02/20/2024 10:28 AM    MCHC 33.6 07/09/2021 10:18 AM    RDW 12.0 02/20/2024 10:28 AM    RDW 12.2 07/09/2021 10:18 AM         Comprehensive Metabolic Profile    Lab Results   Component Value Date/Time     02/20/2024 10:28 AM     07/09/2021 10:18 AM    CO2 22 02/20/2024 10:28 AM    CO2 29 07/06/2022 10:26 AM    CO2 26 07/09/2021 10:18 AM    BUN 28.7 (H) 02/20/2024 10:28 AM    BUN 22 07/06/2022 10:26 AM    BUN 25 07/09/2021 10:18 AM    CR 0.99 (H) 02/20/2024 10:28 AM    CR 1.13 (H) 07/09/2021 10:18 AM    Lab Results   Component Value Date/Time    ALBUMIN 4.0 02/20/2024 10:28 AM    ALBUMIN 3.4 08/23/2022 09:51 AM    ALBUMIN 3.5 07/09/2021 10:18 AM    ALKPHOS 90 02/20/2024 10:28 AM    ALKPHOS 112 07/09/2021 10:18 AM    AST 31 02/20/2024 10:28 AM    AST 43 07/09/2021 10:18 AM    ALT 17 02/20/2024 10:28 AM    ALT 35 07/09/2021 10:18 AM          Assessment and Plan:    S/P Liver Transplantation:   - 8/18/2019 with DBD donor for ANGULO/A1AT/iron overload  - Bi-caval with duct to duct  - Episode of moderate ACR 3/21; treated  - History of biliary stricture; last ERCP 5/2021  -ALT, AST, alkaline phosphatase have improved, noted that she has a persistently elevated total bilirubin that is an indirect hyperbilirubinemia: 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      - Recommend exercise regularly: 4+ times per week, with an  "average of about 40-45 minutes per day.      - It has shown that patients who exercise regularly can have improvement of insulin resistance and resolution of fatty liver disease, even if they are not able to lose weight.     - Recommend a low-carbohydrate, low-calorie diet (6776-5502 calories per day).    - An ideal weight loss plan would be to lose 7-10% of body weight over the next six months  - Management of cholesterol is also very important.    - The use of \"statins\" (HMG-CoA reductase medications) are an effective means of therapy and are not contra-indicated in those with abnormal liver tests OR those with cirrhosis.  The value of these medications in this population far outweigh the minor risks of abnormal liver tests.   - Goal LDL in those with ANGULO are < 100 mg/dL  - She should be started on a statin at this time  - Consider the utility of liberalizing coffee consumption as some data that this may slow progression and reverse effects of ANGULO-related fibrosis.  -We will refer her to the comprehensive weight management clinic here at the [place] given her longstanding issues and inability to lose further weight.  We did discuss lifestyle modifications, medications, as well as surgical interventions all is being safe and appropriate to help her with the assertive weight loss that she needs    Immunosuppression:   - Was transitioned to cyclosporin from tacrolimus for concerns of delerium  - Episode of moderate ACR 3/2021  - Currently on cyclosporine 75mg BID with goal  and MMF 250mg BID  -We will plan to stop the MMF at this time given she is now more than 2 and half months out from her last episode of rejection  - Will plan to check immunosuppression trough levels per protocol to assess for adequate target dosing and will assess CBC and kidney function for toxicity of medications    Kidney Health:   - has developed mild CKD stage II/III  - Will continue to minimize CNI as able    Nutrition/Weight:    " It is very common for patients to put on significant weight after liver transplantation, as they become conditioned to eating as much as possible to maintain a healthy weight pre-transplant.  There is a very significant risk of developing fatty liver and non-alcoholic steatohepatitis in post-transplant patients, even in those who were not transplanted for ANGULO cirrhosis.  - Please work on eating a diet that is rice in fresh fruits and vegetables, moderate amounts of lean protein and dairy, and modest amounts of carbohydrates and fats.  - An exercise plan/regimen is an essential part of remaining healthy in the post-transplant setting and we recommend 30-35 minutes of exercise at least 4 days a week    Routine Health Care:  - You need to establish and maintain care with a primary care physician to manage: hypertension, high cholesterol, abnormal blood sugars - as these are all potential complications of your health after liver transplantation and will need a local physician to manage these issues.  - All patients with liver diseaes (even post transplant) are at an increased risk for osteoporosis.  We strongly recommend screening for Vitamin D deficiency at least twice yearly with aggressive supplementation/replacement as indicated.    - We also recommend a screening DEXA scan to evaluate for osteoporosis.  If present, should treat with calcium, Vitamin D supplementation, and recommend consideration of bisphosphonate therapy.  Also recommend follow up DEXA scans to evaluate for improvement of bone density on therapy.  - Recommend yearly skin exams with dermatology, and if skin cancers are found, recommend twice yearly exams  - Recommend you stay up to date for cancer screening: mammograms/PAP for women and prostate cancer screening for men, and colon cancer screening for all.  - Practice good hygiene with washing of hands and maintaining a clean living space as this will decrease your chances of developing an  infection in the post-transplantation setting  - Eat a health diet: rich in fresh fruits and vegetables, lean proteins, and minimize carbohydrate and fat intake.      Follow up: 12 months     Thank you very much for the opportunity to participate in the care of this patient.  If you have any further questions, please don't hesitate to contact our office.    __________________________________  Thomas M. Leventhal, M.D.   of Medicine  Advanced & Transplant Hepatology  [facility]    I spent 40 minutes on the date of the encounter doing chart review, history and exam, documentation and further activities as noted above.      The longitudinal plan of care for liver transplantation and immunosuppression management (and possible complications) was addressed during this visit. Due to the added complexity in care, I will continue to support this patient in the subsequent management of this condition(s) and with the ongoing continuity of care of this condition

## 2024-02-28 ENCOUNTER — TELEPHONE (OUTPATIENT)
Dept: TRANSPLANT | Facility: CLINIC | Age: 64
End: 2024-02-28
Payer: COMMERCIAL

## 2024-02-28 DIAGNOSIS — T86.41 LIVER TRANSPLANT REJECTION (H): ICD-10-CM

## 2024-02-28 DIAGNOSIS — H92.02 PAIN IN LEFT EAR: ICD-10-CM

## 2024-02-28 DIAGNOSIS — Z94.4 LIVER REPLACED BY TRANSPLANT (H): Primary | ICD-10-CM

## 2024-02-28 DIAGNOSIS — Z13.220 LIPID SCREENING: ICD-10-CM

## 2024-02-28 RX ORDER — MYCOPHENOLATE MOFETIL 250 MG/1
250 CAPSULE ORAL DAILY
Qty: 14 CAPSULE | Refills: 0 | Status: SHIPPED | OUTPATIENT
Start: 2024-02-28 | End: 2024-04-23

## 2024-02-28 RX ORDER — SIMVASTATIN 20 MG
20 TABLET ORAL DAILY
Qty: 90 TABLET | Refills: 0 | Status: SHIPPED | OUTPATIENT
Start: 2024-02-28 | End: 2024-03-08

## 2024-02-28 NOTE — TELEPHONE ENCOUNTER
Per dr Leventhal patient to wean off cellcept. Pt taking cellept 250 mg BID. Pt to wean to 250 mg daily and repeat labs in a week.    Attempted to reach patient on home, but no answer. Spoke with patient     Patient repeated plan. Pt aware that she needs to do weekly labs while doing cellcept wean. Pt to do CK level at 1 week. And 2 months and 3 months of starting simvastatin    Pt will do dexa at [facility]. Order placed.    Pt has been having pain in left ear. Saw PCP and dentist. Pt was instructed to see ENT. ENT referral placed.

## 2024-02-28 NOTE — PROGRESS NOTES
"New Medical Weight Management Consult    PATIENT:  Nicci Pemberton  MRN:         [id number]  :         1960  LALO:         2024        I had the pleasure of seeing your patient, Nicci Pemberton. Full intake/assessment was done to determine barriers to weight loss success and develop a treatment plan. Nicci Pemberton is a 63 year old female interested in treatment of medical problems associated with excess weight. She has a height of 5' 2.008\", a weight of 229 lbs 0 oz, and the calculated Body mass index is 41.87 kg/m .  She'd previously worked with us briefly in 2017 but then went on to need liver transplantation in 2019 and has been lost to follow up in the interim.  She maintains a 21 lb reduction from her previous weight, with resistant Class III obesity that can affect health of her transplant, metabolic/vascular and neoplastic risks going forward.  She had recently followed up with her Gastroenterologist, Dr. Leventhal and she was urged to drop at least 7-10% total body weight to improve overall liver health. She follows up today  about 20 lbs lighter than 2023 but with persisting Class III obesity.  There are, at least in part, some obesogenic properties of her anti-rejection drugs      Nicci is a patient with mature onset class III, morbid obesity with significant element of familial/genetic influence and with current health consequences. She does need aggressive weight loss plan due to fatty liver disease with previous liver transplant at higher risk of rejection at her BMI of 41.9.  Nicci Pemberton has a disorganized meal pattern and she has been working on her diet this year with tracking hemanth, Nutrition X which she finds helpful. She maintains a 21 lb reduction from her heaviest weight in 2017 of 250 lbs but has been working her way back up to that level of excess weight since dropping a lot of weight right after liver transplant (see chart above).  She had some hesitancy intially about " medication support but upon reflection would be open to a trial of GLP1 RA therapy which she appears to have coverage for. As such, we'll see if she tolerates ramping Wegovy (Semaglutide) therapy if we can get it at her pharmacy (current shortages can delay start). It's unclear if her insurance covers Zepbound but that may be an alternative or Saxenda therapy.  She'd previously tried phentermine therapy in the past with some success but ultimately had some intolerances so we'll avoid that option.       Her problem is complicated by a hunger disorder, strong craving/reward pathways, mental health/psychopharmacological barriers, a neurobiological disorder, gender and short stature, and anti-rejection medications for her 2019 liver transplantation.      Review of the patient's history and habits today suggest that weight gain has been steady over the last 3-4 years since kuldeep after transplant. .    Previous Interventions found to be helpful in the past for weight loss include some self guided tracking this year has been helpful for her and teaming up with her , who has dropped about 50 lbs. .    We discussed a toolbox approach to weight management today and she is open to combining mindful, reduced calorie dietary therapy with increased mindfulness techniques, activity/exercise improvements to optimize their current and future health.  Medication assistance for appetite control was discussed today and on review of the risks/benefits for this patients health history, we've decided to start with appetite suppressant therapy.    ASSESSMENT AND PLAN  Problem List Items Addressed This Visit          Other    Status post liver transplantation (H) (Chronic)      Plan:  Welcome to weight loss season. For current activity levels, aiming for 1300-1350kcal/day with 70 grams of lean protein daily and 80 oz of water daily should set you up for good weekly weight reduction. If ramping fitness/yard chores/packing the house to  "move, I'd aim for 1400-1425kcal/day and 75g of lean protein daily.     2. Follow up as planned with dietician.    3. Continue use of tracking hemanth. Portion carefully/mindfully and record everything entering your body. Morning daily weight after emptying your bladder should also be recorded.     4. If tolerated well and available, ramp up Wegovy therapy. I've sent the starting 3 doses and it could take some time for your pharmacy to get these.  If  a month has past, let  me know or you can ask your pharmacist if Saxenda is available instead and if so, let us know: [phone] is the nurse line.    5. Continue aiming to get 2-3 ten minute walks daily and start up morning chair yoga.     6. Wegovy ramps from 0.25mg/week for 4 weeks to 0.5mg/week for 4 weeks then 1mg/week for 4 weeks then 1.7mg/week for 4 weeks then 2.4mg/week for about 6-9 months if working well/well tolerated.  See handout below.    7.  Follow up TSH levels with Dr. Thurston or your endocrine clinic it looks like recent adjustments were made after a bit of excess support. Normal levels are the goal.           60 minutes spent by me on the date of the encounter doing chart review, history and exam, documentation and further activities per the note        She has the following co-morbidities:        2/29/2024    12:03 PM   --   I have the following health issues associated with obesity High Cholesterol    Fatty Liver    Stress Incontinence    Lymphedema    Osteoarthritis (joint disease)    Hypothyroidism   I have the following symptoms associated with obesity Knee Pain    Lower Extremity Swelling            No data to display                    2/29/2024    12:03 PM   Referring Provider   Please name the provider who referred you to Medical Weight Management  If you do not know, please answer \"I Don't Know\" Dr Thomas Leventhal           2/29/2024    12:03 PM   Weight History   How concerned are you about your weight? Very Concerned   I became overweight " As a Teenager   The following factors have contributed to my weight gain Eating Wrong Types of Food    Eating Too Much    Lack of Exercise    Genetic (Runs in the Family)   I have tried the following methods to lose weight Watching Portions or Calories    Medications   My lowest weight since age 18 was 145   My highest weight since age 18 was 245   The most weight I have ever lost was (lbs) 80   I have the following family history of obesity/being overweight One or more of my siblings are overweight   How has your weight changed over the last year? Lost   How many pounds? 20   Liver transplant history. 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     Had lost a lot of weight heading into the liver transplant and afterwards. Steady increase since early 2020.  Has been following some dietary changes in recent months. Counting some calories and carbohydrates, using a tracking brandon recently but not seeing as good a change over the holidays and struggling to get back on track. Finds the Brandon very helpful and her 's prediabetes is improved after a 50 lb reduction.     Her knee pain can limit exercise capacity but maintains an active home/yard life but finds that she moves slowly.     Shopping is possible with her knee pain. Upwards of an hour of walking is sustainable depending on the day. No fatigue from that level of effort. Exercise bike is hard for her.   In process of selling house. Has treadmill.    RMR of 1546.      2/29/2024    12:03 PM   Diet Recall Review with Patient   If you do eat breakfast, what types of food do you eat? Eggs, keto cereal, fruit, yogurt, juice, coffee   If you do eat lunch, what types of food do you typically eat? Lunch meat, cheese sticks, puffcorn snacks, meat sticks, cream cheese   If you do eat supper, what types of food do you typically eat? Seafood, poultry, pork, beef, casserole, salad, vegetables   If you do snack, what types of food do you typically eat?  Nuts, carb-smart ice cream, puffcorn snacks, popcorn, sugar-free jello   How many glasses of juice do you drink in a typical day? 1   How many of glasses of milk do you drink in a typical day? 0   How many 8oz glasses of sugar containing drinks such as Miky-Aid/sweet tea do you drink in a day? 0   How many cans/bottles of sugar pop/soda/tea/sports drinks do you drink in a day? 0   How many cans/bottles of diet pop/soda/tea or sports drink do you drink in a day? 2   How often do you have a drink of alcohol? Never   Drinkable calories could be reduced. Given impaired liver function, avoiding artificial sweetener would be helpful.         2/29/2024    12:03 PM   Eating Habits   Generally, my meals include foods like these bread, pasta, rice, potatoes, corn, crackers, sweet dessert, pop, or juice Once a Week   Generally, my meals include foods like these fried meats, brats, burgers, french fries, pizza, cheese, chips, or ice cream A Few Times a Week   Eat fast food (like McDonalds, [employer] George, Taco Bell) Never   Eat at a buffet or sit-down restaurant Less Than Weekly   Eat most of my meals in front of the TV or computer Almost Everyday   Often skip meals, eat at random times, have no regular eating times Never   Rarely sit down for a meal but snack or graze throughout Never   Eat extra snacks between meals Almost Everyday   Eat most of my food at the end of the day Never   Eat in the middle of the night or wake up at night to eat Never   Eat extra snacks to prevent or correct low blood sugar Never   Eat to prevent acid reflux or stomach pain Never   Worry about not having enough food to eat Never   I eat when I am depressed Never   I eat when I am stressed Never   I eat when I am bored Less Than Weekly   I eat when I am anxious Never   I eat when I am happy or as a reward Never   I feel hungry all the time even if I just have eaten Less Than Weekly   Feeling full is important to me Never   I finish all the food on my  plate even if I am already full A Few Times a Week   I can't resist eating delicious food or walk past the good food/smell A Few Times a Week   I eat/snack without noticing that I am eating Never   I eat when I am preparing the meal A Few Times a Week   I eat more than usual when I see others eating Less Than Weekly   I have trouble not eating sweets, ice cream, cookies, or chips if they are around the house A Few Times a Week   I think about food all day Never   What foods, if any, do you crave? None   Distracted eating and nibbling between meals can increase portions consumed.         2/29/2024    12:03 PM   Amount of Food   I feel out of control when eating Monthly   I eat a large amount of food, like a loaf of bread, a box of cookies, a pint/quart of ice cream, all at once Never   I eat a large amount of food even when I am not hungry Never   I eat rapidly Never   I eat alone because I feel embarrassed and do not want others to see how much I have eaten Never   I eat until I am uncomfortably full Almost Everyday   I feel bad, disgusted, or guilty after I overeat Never     Satiety signaling may be impaired and eating to physical fullness will make weight reduction difficult.       2/29/2024    12:03 PM   Activity/Exercise History   How much of a typical 12 hour day do you spend lying down? Less Than Half the Day   How much of a typical day do you spend walking/standing? Less Than Half the Day   How many hours (not including work) do you spend on the TV/Video Games/Computer/Tablet/Phone? 4-5 Hours   How many times a week are you active for the purpose of exercise? Never   What keeps you from being more active? Pain    Lack of Time   How many total minutes do you spend doing some activity for the purpose of exercising when you exercise? 15-30 Minutes   Sedentary habits.     PAST MEDICAL HISTORY:  Past Medical History:   Diagnosis Date    Anemia     Cellulitis     Cirrhosis of liver (H) 06/18/2018    cirrhosis  secondary to combination of nonalcoholic fatty liver disease, iron overload, and alpha-1 antitrypsin MZ phenotype who is s/p  donor liver transplant on 19    Gastroesophageal reflux disease     Heterozygous alpha 1-antitrypsin deficiency (H)     High hepatic iron concentration determined by biopsy of liver     Incisional infection 2021    Left TKA incision    Liver cirrhosis secondary to ANGULO (H)     Liver transplanted (H) 2019    Lymphedema     Osteoarthritis     knees    Other chronic pain     Left knee    SBP (spontaneous bacterial peritonitis) (H) 2019 in [place]    Thrombocytopenia (H24) 2020    Thyroid disease     Hypothyroid           2024    12:03 PM   Work/Social History Reviewed With Patient   My employment status is Retired   What is your marital status? /In a Relationship   If in a relationship, is your significant other overweight? Yes   If you have children, are they overweight? No   Who do you live with? Spouse   Who does the food shopping? Both           2024    12:03 PM   Mental Health History Reviewed With Patient   Have you ever been physically or sexually abused? No   How often in the past 2 weeks have you felt little interest or pleasure in doing things? Not at all   Over the past 2 weeks how often have you felt down, depressed, or hopeless? Not at all           2024    12:03 PM   Sleep History Reviewed With Patient   How many hours do you sleep at night? 7   STOPBANG of 3, ESS of 6.    Past Surgical History:   Procedure Laterality Date    ARTHROPLASTY KNEE Right 10/23/2020    Procedure: Right  total knee arthroplasty;  Surgeon: Mario Wallace MD;  [facility]    ARTHROPLASTY KNEE Left 2021    Procedure: Left total knee arthroplasty;  Surgeon: Mario Wallace MD;  [facility]: [facility]    BENCH LIVER N/A 2019    Procedure: BACKBENCH PREPARATION, LIVER;  Surgeon: Mandeep Alford MD;  [facility]: [facility]    BIOPSY  Bone  marrow 2018    Liver ~ 1980; 2021    CHOLECYSTECTOMY  8/18/2019    Removed during liver transplant    COLONOSCOPY N/A 06/22/2018    Procedure: COLONOSCOPY;  colonoscopy;  Surgeon: Hugo Patel MD;  [facility]    ENDOSCOPIC RETROGRADE CHOLANGIOPANCREATOGRAM N/A 02/10/2020    Procedure: ENDOSCOPIC RETROGRADE CHOLANGIOPANCREATOGRAPHY with spinchterotomy;  Surgeon: Guru Rigoberto Canada MD;  [facility]    ENDOSCOPIC RETROGRADE CHOLANGIOPANCREATOGRAM N/A 05/13/2020    Procedure: ENDOSCOPIC RETROGRADE CHOLANGIOPANCREATOGRAPHY,Stent exchange and sludge removal;  Surgeon: Guru Rigoberto Canada MD;  [facility]    ENDOSCOPIC RETROGRADE CHOLANGIOPANCREATOGRAM N/A 02/24/2021    Procedure: ENDOSCOPIC RETROGRADE CHOLANGIOPANCREATOGRAPHY, SPINCTEROTOMY, DEBRIDE REMOVAL, STENT PLACEMENT;  Surgeon: Guru Rigoberto Canada MD;  [facility]    ENDOSCOPIC RETROGRADE CHOLANGIOPANCREATOGRAPHY, EXCHANGE TUBE/STENT N/A 03/04/2020    Procedure: ENDOSCOPIC RETROGRADE CHOLANGIOPANCREATOGRAPHY, WITH biliary dilarion, stent exchange, stone removal;  Surgeon: Guru Rigoberto Canada MD;  [facility]    ENDOSCOPIC RETROGRADE CHOLANGIOPANCREATOGRAPHY, EXCHANGE TUBE/STENT N/A 05/12/2021    Procedure: ENDOSCOPIC RETROGRADE CHOLANGIOPANCREATOGRAPHY WITH BILIARY STENT EXCHANGE, DILATION AND SLUDGE/STONE REMOVAL;  Surgeon: Guru Rigoberto Canada MD;  [facility]    ESOPHAGOSCOPY, GASTROSCOPY, DUODENOSCOPY (EGD), COMBINED N/A 10/31/2019    Procedure: Esophagogastroduodenoscopy, With Biopsy;  Surgeon: Nerissa Aranda MD;  [facility]    IR FEEDING TUBE PLACEMENT W MOHAN/MD  09/06/2019    IR FLUORO 0-1 HOUR  09/05/2019    IR LIVER BIOPSY PERCUTANEOUS  03/25/2021    IR LUMBAR PUNCTURE  09/05/2019    IRRIGATION AND DEBRIDEMENT LOWER EXTREMITY, COMBINED Left 11/10/2021    Procedure: Irrigation and debridement Left knee skin subcutaneous  tissue (length: 10cm), Application of wound vac;  Surgeon: Mario Wallace MD;  Location: [facility]    KNEE SURGERY  10/23/2020    LA STOMACH SURGERY PROCEDURE UNLISTED  Liver transplant 19    TRANSPLANT LIVER RECIPIENT  DONOR N/A 2019    Procedure: TRANSPLANT, LIVER, RECIPIENT,  DONOR;  Surgeon: Mandeep Alford MD;  [facility]    US PARACENTESIS  2019    US PARACENTESIS WITH ALBUMIN  2019       Social History     Socioeconomic History    Marital status:      Spouse name: Not on file    Number of children: Not on file    Years of education: Not on file    Highest education level: Not on file   Occupational History    Not on file   Tobacco Use    Smoking status: Former     Packs/day: 0.50     Years: 10.00     Additional pack years: 0.00     Total pack years: 5.00     Types: Cigarettes     Start date: 2000     Quit date: 10/3/2011     Years since quittin.4    Smokeless tobacco: Never   Substance and Sexual Activity    Alcohol use: Not Currently     Comment: Former weekend social drinker;  no use since     Drug use: Not Currently     Types: Marijuana     Comment: Teen years    Sexual activity: Yes     Partners: Male     Birth control/protection: Post-menopausal   Other Topics Concern    Parent/sibling w/ CABG, MI or angioplasty before 65F 55M? Not Asked   Social History Narrative    Not on file     Social Determinants of Health     Financial Resource Strain: Not on file   Food Insecurity: Not on file   Transportation Needs: Not on file   Physical Activity: Not on file   Stress: Not on file   Social Connections: Not on file   Interpersonal Safety: Not on file   Housing Stability: Not on file       MEDICATIONS:   Current Outpatient Medications   Medication Sig Dispense Refill    acetaminophen (TYLENOL) 325 MG tablet Take 2 tablets (650 mg) by mouth every 4 hours as needed for other 60 tablet 0    Cholecalciferol (VITAMIN D-3) 25 MCG (1000 UT) CAPS Take  "1,000 Units by mouth 2 times daily 60 capsule 1    COMPRESSION STOCKINGS 2 each every 12 hours 2 Product 1    cycloSPORINE modified (GENERIC EQUIVALENT) 25 MG capsule Take 3 capsules (75 mg) by mouth every morning AND 2 capsules (50 mg) every evening. 450 capsule 3    famotidine (PEPCID) 20 MG tablet Take 1 tablet (20 mg) by mouth 2 times daily 60 tablet 3    levothyroxine (SYNTHROID/LEVOTHROID) 125 MCG tablet Take 112.5 mcg by mouth every morning      mycophenolate (GENERIC EQUIVALENT) 250 MG capsule Take 1 capsule (250 mg) by mouth daily Pt weaning off. Does not need refill 14 capsule 0    simvastatin (ZOCOR) 20 MG tablet Take 1 tablet (20 mg) by mouth daily 90 tablet 0   Antirejection drugs can increase appetite in many patients.  Nutrition X hemanth.   ALLERGIES:   Allergies   Allergen Reactions    Ciprofloxacin Dizziness and Nausea     Other reaction(s): Dizziness    Furosemide Rash and Swelling     Other reaction(s): rash  8/14/14  Other reaction(s): rash  8/14/14    Cefpodoxime Other (See Comments)    Food      Fruits with cores and pits  Apples    Linezolid Other (See Comments)    Sulfa Antibiotics Other (See Comments) and Unknown     Swollen joints    Sulfamethoxazole-Trimethoprim      Other reaction(s): Unknown  Other reaction(s): Unknown     Lab Results   Component Value Date    A1C 5.0 08/18/2019    A1C 4.2 02/15/2017     Liver Function Studies -   Recent Labs   Lab Test 02/20/24  1028   PROTTOTAL 7.1   ALBUMIN 4.0   BILITOTAL 2.1*   ALKPHOS 90   AST 31   ALT 17     TSH   Date Value Ref Range Status   02/20/2024 0.09 (L) 0.30 - 4.20 uIU/mL Final   03/01/2022 0.82 0.40 - 4.00 mU/L Final   01/26/2021 0.88 0.40 - 4.00 mU/L Final     A bit over supplemented currently with synthroid per last check a week ago.    Recent Labs   Lab Test 02/20/24  1028 02/06/23  1041   CHOL 206* 204*   HDL 54 52   * 121*   TRIG 76 154*             PHYSICAL EXAM:  Objective    Ht 1.575 m (5' 2.01\")   Wt 103.9 kg (229 lb)   " BMI 41.87 kg/m    Vitals - Patient Reported  Pain Score: No Pain (0)      Vitals:  No vitals were obtained today due to virtual visit.    Physical Exam   GENERAL: alert and no distress  EYES: Eyes grossly normal to inspection.  No discharge or erythema, or obvious scleral/conjunctival abnormalities.  RESP: No audible wheeze, cough, or visible cyanosis.    SKIN: Visible skin clear. No significant rash, abnormal pigmentation or lesions.  NEURO: Cranial nerves grossly intact.  Mentation and speech appropriate for age.  PSYCH: Appropriate affect, tone, and pace of words        Sincerely,    Darron Andujar MD

## 2024-02-29 ENCOUNTER — VIRTUAL VISIT (OUTPATIENT)
Dept: SURGERY | Facility: CLINIC | Age: 64
End: 2024-02-29
Payer: COMMERCIAL

## 2024-02-29 ENCOUNTER — TELEPHONE (OUTPATIENT)
Dept: SURGERY | Facility: CLINIC | Age: 64
End: 2024-02-29

## 2024-02-29 ENCOUNTER — VIRTUAL VISIT (OUTPATIENT)
Dept: SURGERY | Facility: CLINIC | Age: 64
End: 2024-02-29
Attending: INTERNAL MEDICINE
Payer: COMMERCIAL

## 2024-02-29 VITALS — HEIGHT: 62 IN | BODY MASS INDEX: 42.14 KG/M2 | WEIGHT: 229 LBS

## 2024-02-29 DIAGNOSIS — K75.81 NASH (NONALCOHOLIC STEATOHEPATITIS): ICD-10-CM

## 2024-02-29 DIAGNOSIS — Z71.3 NUTRITIONAL COUNSELING: ICD-10-CM

## 2024-02-29 DIAGNOSIS — E66.01 CLASS 3 SEVERE OBESITY DUE TO EXCESS CALORIES WITH SERIOUS COMORBIDITY AND BODY MASS INDEX (BMI) OF 40.0 TO 44.9 IN ADULT (H): Primary | ICD-10-CM

## 2024-02-29 DIAGNOSIS — M25.561 CHRONIC PAIN OF BOTH KNEES: ICD-10-CM

## 2024-02-29 DIAGNOSIS — Z94.4 STATUS POST LIVER TRANSPLANTATION (H): Chronic | ICD-10-CM

## 2024-02-29 DIAGNOSIS — I10 HYPERTENSION, UNSPECIFIED TYPE: ICD-10-CM

## 2024-02-29 DIAGNOSIS — M25.562 CHRONIC PAIN OF BOTH KNEES: ICD-10-CM

## 2024-02-29 DIAGNOSIS — E66.813 CLASS 3 SEVERE OBESITY DUE TO EXCESS CALORIES WITH SERIOUS COMORBIDITY AND BODY MASS INDEX (BMI) OF 40.0 TO 44.9 IN ADULT (H): Primary | ICD-10-CM

## 2024-02-29 DIAGNOSIS — G89.29 CHRONIC PAIN OF BOTH KNEES: ICD-10-CM

## 2024-02-29 DIAGNOSIS — E66.01 OBESITY, CLASS III, BMI 40-49.9 (MORBID OBESITY) (H): Primary | ICD-10-CM

## 2024-02-29 PROBLEM — S42.92XA: Status: ACTIVE | Noted: 2021-11-15

## 2024-02-29 PROBLEM — E78.49 OTHER HYPERLIPIDEMIA: Status: ACTIVE | Noted: 2024-02-29

## 2024-02-29 PROCEDURE — 97802 MEDICAL NUTRITION INDIV IN: CPT | Mod: 93

## 2024-02-29 PROCEDURE — 99205 OFFICE O/P NEW HI 60 MIN: CPT | Mod: 95 | Performed by: EMERGENCY MEDICINE

## 2024-02-29 RX ORDER — SEMAGLUTIDE 1 MG/.5ML
1 INJECTION, SOLUTION SUBCUTANEOUS
Qty: 2 ML | Refills: 0 | Status: SHIPPED | OUTPATIENT
Start: 2024-05-03 | End: 2024-03-24

## 2024-02-29 RX ORDER — SEMAGLUTIDE 0.5 MG/.5ML
0.5 INJECTION, SOLUTION SUBCUTANEOUS
Qty: 2 ML | Refills: 0 | Status: SHIPPED | OUTPATIENT
Start: 2024-04-05 | End: 2024-03-24

## 2024-02-29 RX ORDER — SEMAGLUTIDE 0.25 MG/.5ML
0.25 INJECTION, SOLUTION SUBCUTANEOUS WEEKLY
Qty: 2 ML | Refills: 0 | Status: SHIPPED | OUTPATIENT
Start: 2024-03-08 | End: 2024-03-24

## 2024-02-29 NOTE — TELEPHONE ENCOUNTER
FUTURE VISIT INFORMATION      FUTURE VISIT INFORMATION:  Date: 5/23/24  Time: 8:20 AM  Location: [facility]  REFERRAL INFORMATION:  Referring provider:  Leventhal, Thomas Michael, MD   Referring providers clinic:  [facility]  Reason for visit/diagnosis:  H92.02 (ICD-10-CM) - Pain in left ear, Referral from Thomas Leventhal, Referral notes in [facility]. Pt made appt for [facility] Location     RECORDS REQUESTED FROM      Clinic name Comments Records Status Imaging Status   [facility] 2/28/24 telephone referral- Leventhal, Thomas Michael, MD  [facility]  2/21/24 summary note- Wale Thurston MD [facility] 10/5/19 MR brain  10/4/19 CT head Epic PACS

## 2024-02-29 NOTE — LETTER
"    2024         RE: Nicci Pemberton  [address] [place] [address]        Dear Colleague,    Thank you for referring your patient, Nicci Pemberton, to the [facility] [place]. Please see a copy of my visit note below.    New Medical Weight Management Consult    PATIENT:  Nicci Pemberton  MRN:         [id number]  :         1960  LALO:         2024        I had the pleasure of seeing your patient, Nicci Pemberton. Full intake/assessment was done to determine barriers to weight loss success and develop a treatment plan. Nicci Pemberton is a 63 year old female interested in treatment of medical problems associated with excess weight. She has a height of 5' 2.008\", a weight of 229 lbs 0 oz, and the calculated Body mass index is 41.87 kg/m .  She'd previously worked with us briefly in 2017 but then went on to need liver transplantation in 2019 and has been lost to follow up in the interim.  She maintains a 21 lb reduction from her previous weight, with resistant Class III obesity that can affect health of her transplant, metabolic/vascular and neoplastic risks going forward.  She had recently followed up with her Gastroenterologist, Dr. Leventhal and she was urged to drop at least 7-10% total body weight to improve overall liver health. She follows up today  about 20 lbs lighter than 2023 but with persisting Class III obesity.  There are, at least in part, some obesogenic properties of her anti-rejection drugs      Nicci is a patient with mature onset class III, morbid obesity with significant element of familial/genetic influence and with current health consequences. She does need aggressive weight loss plan due to fatty liver disease with previous liver transplant at higher risk of rejection at her BMI of 41.9.  Nicci Pemberton has a disorganized meal pattern and she has been working on her diet this year with tracking hemanth, Nutrition X which she " finds helpful. She maintains a 21 lb reduction from her heaviest weight in 2017 of 250 lbs but has been working her way back up to that level of excess weight since dropping a lot of weight right after liver transplant (see chart above).  She had some hesitancy intially about medication support but upon reflection would be open to a trial of GLP1 RA therapy which she appears to have coverage for. As such, we'll see if she tolerates ramping Wegovy (Semaglutide) therapy if we can get it at her pharmacy (current shortages can delay start). It's unclear if her insurance covers Zepbound but that may be an alternative or Saxenda therapy.  She'd previously tried phentermine therapy in the past with some success but ultimately had some intolerances so we'll avoid that option.       Her problem is complicated by a hunger disorder, strong craving/reward pathways, mental health/psychopharmacological barriers, a neurobiological disorder, gender and short stature, and anti-rejection medications for her 2019 liver transplantation.      Review of the patient's history and habits today suggest that weight gain has been steady over the last 3-4 years since kuldeep after transplant. .    Previous Interventions found to be helpful in the past for weight loss include some self guided tracking this year has been helpful for her and teaming up with her , who has dropped about 50 lbs. .    We discussed a toolbox approach to weight management today and she is open to combining mindful, reduced calorie dietary therapy with increased mindfulness techniques, activity/exercise improvements to optimize their current and future health.  Medication assistance for appetite control was discussed today and on review of the risks/benefits for this patients health history, we've decided to start with appetite suppressant therapy.    ASSESSMENT AND PLAN  Problem List Items Addressed This Visit          Other    Status post liver transplantation  (H) (Chronic)      Plan:  Welcome to weight loss season. For current activity levels, aiming for 1300-1350kcal/day with 70 grams of lean protein daily and 80 oz of water daily should set you up for good weekly weight reduction. If ramping fitness/yard chores/packing the house to move, I'd aim for 1400-1425kcal/day and 75g of lean protein daily.     2. Follow up as planned with dietician.    3. Continue use of tracking hemanth. Portion carefully/mindfully and record everything entering your body. Morning daily weight after emptying your bladder should also be recorded.     4. If tolerated well and available, ramp up Wegovy therapy. I've sent the starting 3 doses and it could take some time for your pharmacy to get these.  If  a month has past, let  me know or you can ask your pharmacist if Saxenda is available instead and if so, let us know: [phone] is the nurse line.    5. Continue aiming to get 2-3 ten minute walks daily and start up morning chair yoga.     6. Wegovy ramps from 0.25mg/week for 4 weeks to 0.5mg/week for 4 weeks then 1mg/week for 4 weeks then 1.7mg/week for 4 weeks then 2.4mg/week for about 6-9 months if working well/well tolerated.  See handout below.    7.  Follow up TSH levels with Dr. Thurston or your endocrine clinic it looks like recent adjustments were made after a bit of excess support. Normal levels are the goal.           60 minutes spent by me on the date of the encounter doing chart review, history and exam, documentation and further activities per the note        She has the following co-morbidities:        2/29/2024    12:03 PM   --   I have the following health issues associated with obesity High Cholesterol    Fatty Liver    Stress Incontinence    Lymphedema    Osteoarthritis (joint disease)    Hypothyroidism   I have the following symptoms associated with obesity Knee Pain    Lower Extremity Swelling            No data to display                    2/29/2024    12:03 PM   Referring  "Provider   Please name the provider who referred you to Medical Weight Management  If you do not know, please answer \"I Don't Know\" Dr Thomas Leventhal           2/29/2024    12:03 PM   Weight History   How concerned are you about your weight? Very Concerned   I became overweight As a Teenager   The following factors have contributed to my weight gain Eating Wrong Types of Food    Eating Too Much    Lack of Exercise    Genetic (Runs in the Family)   I have tried the following methods to lose weight Watching Portions or Calories    Medications   My lowest weight since age 18 was 145   My highest weight since age 18 was 245   The most weight I have ever lost was (lbs) 80   I have the following family history of obesity/being overweight One or more of my siblings are overweight   How has your weight changed over the last year? Lost   How many pounds? 20   Liver transplant history. 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     Had lost a lot of weight heading into the liver transplant and afterwards. Steady increase since early 2020.  Has been following some dietary changes in recent months. Counting some calories and carbohydrates, using a tracking brandon recently but not seeing as good a change over the holidays and struggling to get back on track. Finds the Brandon very helpful and her 's prediabetes is improved after a 50 lb reduction.     Her knee pain can limit exercise capacity but maintains an active home/yard life but finds that she moves slowly.     Shopping is possible with her knee pain. Upwards of an hour of walking is sustainable depending on the day. No fatigue from that level of effort. Exercise bike is hard for her.   In process of selling house. Has treadmill.    RMR of 1546.      2/29/2024    12:03 PM   Diet Recall Review with Patient   If you do eat breakfast, what types of food do you eat? Eggs, keto cereal, fruit, yogurt, juice, coffee   If you do eat lunch, what types of " food do you typically eat? Lunch meat, cheese sticks, puffcorn snacks, meat sticks, cream cheese   If you do eat supper, what types of food do you typically eat? Seafood, poultry, pork, beef, casserole, salad, vegetables   If you do snack, what types of food do you typically eat? Nuts, carb-smart ice cream, puffcorn snacks, popcorn, sugar-free jello   How many glasses of juice do you drink in a typical day? 1   How many of glasses of milk do you drink in a typical day? 0   How many 8oz glasses of sugar containing drinks such as Miky-Aid/sweet tea do you drink in a day? 0   How many cans/bottles of sugar pop/soda/tea/sports drinks do you drink in a day? 0   How many cans/bottles of diet pop/soda/tea or sports drink do you drink in a day? 2   How often do you have a drink of alcohol? Never   Drinkable calories could be reduced. Given impaired liver function, avoiding artificial sweetener would be helpful.         2/29/2024    12:03 PM   Eating Habits   Generally, my meals include foods like these bread, pasta, rice, potatoes, corn, crackers, sweet dessert, pop, or juice Once a Week   Generally, my meals include foods like these fried meats, brats, burgers, french fries, pizza, cheese, chips, or ice cream A Few Times a Week   Eat fast food (like McDonalds, Burger George, Taco Bell) Never   Eat at a buffet or sit-down restaurant Less Than Weekly   Eat most of my meals in front of the TV or computer Almost Everyday   Often skip meals, eat at random times, have no regular eating times Never   Rarely sit down for a meal but snack or graze throughout Never   Eat extra snacks between meals Almost Everyday   Eat most of my food at the end of the day Never   Eat in the middle of the night or wake up at night to eat Never   Eat extra snacks to prevent or correct low blood sugar Never   Eat to prevent acid reflux or stomach pain Never   Worry about not having enough food to eat Never   I eat when I am depressed Never   I eat when  I am stressed Never   I eat when I am bored Less Than Weekly   I eat when I am anxious Never   I eat when I am happy or as a reward Never   I feel hungry all the time even if I just have eaten Less Than Weekly   Feeling full is important to me Never   I finish all the food on my plate even if I am already full A Few Times a Week   I can't resist eating delicious food or walk past the good food/smell A Few Times a Week   I eat/snack without noticing that I am eating Never   I eat when I am preparing the meal A Few Times a Week   I eat more than usual when I see others eating Less Than Weekly   I have trouble not eating sweets, ice cream, cookies, or chips if they are around the house A Few Times a Week   I think about food all day Never   What foods, if any, do you crave? None   Distracted eating and nibbling between meals can increase portions consumed.         2/29/2024    12:03 PM   Amount of Food   I feel out of control when eating Monthly   I eat a large amount of food, like a loaf of bread, a box of cookies, a pint/quart of ice cream, all at once Never   I eat a large amount of food even when I am not hungry Never   I eat rapidly Never   I eat alone because I feel embarrassed and do not want others to see how much I have eaten Never   I eat until I am uncomfortably full Almost Everyday   I feel bad, disgusted, or guilty after I overeat Never     Satiety signaling may be impaired and eating to physical fullness will make weight reduction difficult.       2/29/2024    12:03 PM   Activity/Exercise History   How much of a typical 12 hour day do you spend lying down? Less Than Half the Day   How much of a typical day do you spend walking/standing? Less Than Half the Day   How many hours (not including work) do you spend on the TV/Video Games/Computer/Tablet/Phone? 4-5 Hours   How many times a week are you active for the purpose of exercise? Never   What keeps you from being more active? Pain    Lack of Time   How  many total minutes do you spend doing some activity for the purpose of exercising when you exercise? 15-30 Minutes   Sedentary habits.     PAST MEDICAL HISTORY:  Past Medical History:   Diagnosis Date     Anemia      Cellulitis      Cirrhosis of liver (H) 2018    cirrhosis secondary to combination of nonalcoholic fatty liver disease, iron overload, and alpha-1 antitrypsin MZ phenotype who is s/p  donor liver transplant on 19     Gastroesophageal reflux disease      Heterozygous alpha 1-antitrypsin deficiency (H)      High hepatic iron concentration determined by biopsy of liver      Incisional infection 2021    Left TKA incision     Liver cirrhosis secondary to ANGULO (H)      Liver transplanted (H) 2019     Lymphedema      Osteoarthritis     knees     Other chronic pain     Left knee     SBP (spontaneous bacterial peritonitis) (H) 2019 in [place]     Thrombocytopenia (H24) 2020     Thyroid disease     Hypothyroid           2024    12:03 PM   Work/Social History Reviewed With Patient   My employment status is Retired   What is your marital status? /In a Relationship   If in a relationship, is your significant other overweight? Yes   If you have children, are they overweight? No   Who do you live with? Spouse   Who does the food shopping? Both           2024    12:03 PM   Mental Health History Reviewed With Patient   Have you ever been physically or sexually abused? No   How often in the past 2 weeks have you felt little interest or pleasure in doing things? Not at all   Over the past 2 weeks how often have you felt down, depressed, or hopeless? Not at all           2024    12:03 PM   Sleep History Reviewed With Patient   How many hours do you sleep at night? 7   STOPBANG of 3, ESS of 6.    Past Surgical History:   Procedure Laterality Date     ARTHROPLASTY KNEE Right 10/23/2020    Procedure: Right  total knee arthroplasty;  Surgeon: Mario Wallace  MD;  [facility]     ARTHROPLASTY KNEE Left 09/24/2021    Procedure: Left total knee arthroplasty;  Surgeon: Mario Wallace MD;  [facility]     BENCH LIVER N/A 08/18/2019    Procedure: BACKBENCH PREPARATION, LIVER;  Surgeon: Mandeep Alford MD;  Location: [facility]     BIOPSY  Bone marrow 2018    Liver ~ 1980; 2021     CHOLECYSTECTOMY  8/18/2019    Removed during liver transplant     COLONOSCOPY N/A 06/22/2018    Procedure: COLONOSCOPY;  colonoscopy;  Surgeon: Hugo Patel MD;  [facility]     ENDOSCOPIC RETROGRADE CHOLANGIOPANCREATOGRAM N/A 02/10/2020    Procedure: ENDOSCOPIC RETROGRADE CHOLANGIOPANCREATOGRAPHY with spinchterotomy;  Surgeon: Guru Rigoberto Canada MD;  [facility]     ENDOSCOPIC RETROGRADE CHOLANGIOPANCREATOGRAM N/A 05/13/2020    Procedure: ENDOSCOPIC RETROGRADE CHOLANGIOPANCREATOGRAPHY,Stent exchange and sludge removal;  Surgeon: Guru Rigoberto Canada MD;  [facility]     ENDOSCOPIC RETROGRADE CHOLANGIOPANCREATOGRAM N/A 02/24/2021    Procedure: ENDOSCOPIC RETROGRADE CHOLANGIOPANCREATOGRAPHY, SPINCTEROTOMY, DEBRIDE REMOVAL, STENT PLACEMENT;  Surgeon: Guru Rigoberto Canada MD;  [facility]     ENDOSCOPIC RETROGRADE CHOLANGIOPANCREATOGRAPHY, EXCHANGE TUBE/STENT N/A 03/04/2020    Procedure: ENDOSCOPIC RETROGRADE CHOLANGIOPANCREATOGRAPHY, WITH biliary dilarion, stent exchange, stone removal;  Surgeon: Guru Rigoberto Canada MD;  [facility]     ENDOSCOPIC RETROGRADE CHOLANGIOPANCREATOGRAPHY, EXCHANGE TUBE/STENT N/A 05/12/2021    Procedure: ENDOSCOPIC RETROGRADE CHOLANGIOPANCREATOGRAPHY WITH BILIARY STENT EXCHANGE, DILATION AND SLUDGE/STONE REMOVAL;  Surgeon: Guru Rigoberto Canada MD;  [facility]     ESOPHAGOSCOPY, GASTROSCOPY, DUODENOSCOPY (EGD), COMBINED N/A 10/31/2019    Procedure: Esophagogastroduodenoscopy, With Biopsy;  Surgeon: Nerissa Aranda MD;  [facility]      IR FEEDING TUBE PLACEMENT W MOHAN/MD  2019     IR FLUORO 0-1 HOUR  2019     IR LIVER BIOPSY PERCUTANEOUS  2021     IR LUMBAR PUNCTURE  2019     IRRIGATION AND DEBRIDEMENT LOWER EXTREMITY, COMBINED Left 11/10/2021    Procedure: Irrigation and debridement Left knee skin subcutaneous tissue (length: 10cm), Application of wound vac;  Surgeon: Mario Wallace MD;  Location[facility]     KNEE SURGERY  10/23/2020     MT STOMACH SURGERY PROCEDURE UNLISTED  Liver transplant 19     TRANSPLANT LIVER RECIPIENT  DONOR N/A 2019    Procedure: TRANSPLANT, LIVER, RECIPIENT,  DONOR;  Surgeon: Mandeep Alford MD;  [facility]     US PARACENTESIS  2019     US PARACENTESIS WITH ALBUMIN  2019       Social History     Socioeconomic History     Marital status:      Spouse name: Not on file     Number of children: Not on file     Years of education: Not on file     Highest education level: Not on file   Occupational History     Not on file   Tobacco Use     Smoking status: Former     Packs/day: 0.50     Years: 10.00     Additional pack years: 0.00     Total pack years: 5.00     Types: Cigarettes     Start date: 2000     Quit date: 10/3/2011     Years since quittin.4     Smokeless tobacco: Never   Substance and Sexual Activity     Alcohol use: Not Currently     Comment: Former weekend social drinker;  no use since      Drug use: Not Currently     Types: Marijuana     Comment: Teen years     Sexual activity: Yes     Partners: Male     Birth control/protection: Post-menopausal   Other Topics Concern     Parent/sibling w/ CABG, MI or angioplasty before 65F 55M? Not Asked   Social History Narrative     Not on file     Social Determinants of Health     Financial Resource Strain: Not on file   Food Insecurity: Not on file   Transportation Needs: Not on file   Physical Activity: Not on file   Stress: Not on file   Social Connections: Not on file    Interpersonal Safety: Not on file   Housing Stability: Not on file       MEDICATIONS:   Current Outpatient Medications   Medication Sig Dispense Refill     acetaminophen (TYLENOL) 325 MG tablet Take 2 tablets (650 mg) by mouth every 4 hours as needed for other 60 tablet 0     Cholecalciferol (VITAMIN D-3) 25 MCG (1000 UT) CAPS Take 1,000 Units by mouth 2 times daily 60 capsule 1     COMPRESSION STOCKINGS 2 each every 12 hours 2 Product 1     cycloSPORINE modified (GENERIC EQUIVALENT) 25 MG capsule Take 3 capsules (75 mg) by mouth every morning AND 2 capsules (50 mg) every evening. 450 capsule 3     famotidine (PEPCID) 20 MG tablet Take 1 tablet (20 mg) by mouth 2 times daily 60 tablet 3     levothyroxine (SYNTHROID/LEVOTHROID) 125 MCG tablet Take 112.5 mcg by mouth every morning       mycophenolate (GENERIC EQUIVALENT) 250 MG capsule Take 1 capsule (250 mg) by mouth daily Pt weaning off. Does not need refill 14 capsule 0     simvastatin (ZOCOR) 20 MG tablet Take 1 tablet (20 mg) by mouth daily 90 tablet 0   Antirejection drugs can increase appetite in many patients.  Nutrition X hemanth.   ALLERGIES:   Allergies   Allergen Reactions     Ciprofloxacin Dizziness and Nausea     Other reaction(s): Dizziness     Furosemide Rash and Swelling     Other reaction(s): rash  8/14/14  Other reaction(s): rash  8/14/14     Cefpodoxime Other (See Comments)     Food      Fruits with cores and pits  Apples     Linezolid Other (See Comments)     Sulfa Antibiotics Other (See Comments) and Unknown     Swollen joints     Sulfamethoxazole-Trimethoprim      Other reaction(s): Unknown  Other reaction(s): Unknown     Lab Results   Component Value Date    A1C 5.0 08/18/2019    A1C 4.2 02/15/2017     Liver Function Studies -   Recent Labs   Lab Test 02/20/24  1028   PROTTOTAL 7.1   ALBUMIN 4.0   BILITOTAL 2.1*   ALKPHOS 90   AST 31   ALT 17     TSH   Date Value Ref Range Status   02/20/2024 0.09 (L) 0.30 - 4.20 uIU/mL Final   03/01/2022 0.82  "0.40 - 4.00 mU/L Final   01/26/2021 0.88 0.40 - 4.00 mU/L Final     A bit over supplemented currently with synthroid per last check a week ago.    Recent Labs   Lab Test 02/20/24  1028 02/06/23  1041   CHOL 206* 204*   HDL 54 52   * 121*   TRIG 76 154*             PHYSICAL EXAM:  Objective   Ht 1.575 m (5' 2.01\")   Wt 103.9 kg (229 lb)   BMI 41.87 kg/m    Vitals - Patient Reported  Pain Score: No Pain (0)      Vitals:  No vitals were obtained today due to virtual visit.    Physical Exam   GENERAL: alert and no distress  EYES: Eyes grossly normal to inspection.  No discharge or erythema, or obvious scleral/conjunctival abnormalities.  RESP: No audible wheeze, cough, or visible cyanosis.    SKIN: Visible skin clear. No significant rash, abnormal pigmentation or lesions.  NEURO: Cranial nerves grossly intact.  Mentation and speech appropriate for age.  PSYCH: Appropriate affect, tone, and pace of words        Sincerely,    Darron Andujar MD            Virtual Visit Details    Type of service:  Video Visit     Originating Location (pt. Location): Home    Distant Location (provider location):  On-site  Platform used for Video Visit: NicoleWell      Again, thank you for allowing me to participate in the care of your patient.        Sincerely,        Darron Andujar MD  "

## 2024-02-29 NOTE — LETTER
2/29/2024         RE: Nicci Pemberton  [address]  [facility] [address]        Dear Colleague,    Thank you for referring your patient, Nicci Pemberton, to the [facility] [place]. Please see a copy of my visit note below.    Nicci Pemberton is a 63 year old who is being evaluated via a billable telephonevisit.        Medical Weight Loss Initial Diet Evaluation  Assessment:  This patient was referred by Dr. Andujar for MNT as treatment for Morbid obesity which is impacting her cholesterol levels, fatty liver, and pain.  Nicci is presenting today for a new weight management nutrition consultation. Pt has had an initial appointment with Dr. Andujar.    Weight loss medication: Semaglutide.     Personal Goals: weight loss and health focused      Anthropometrics:    Pt's weight is 229 lbs  Initial weight: 229 lbs  Weight change: n/a  BMI: There is no height or weight on file to calculate BMI.   Ideal body weight: 50.1 kg (110 lb 7.2 oz)  Adjusted ideal body weight: 72 kg (158 lb 10.7 oz)  Estimated RMR (Humphreys-St Jeor equation):  1550 kcals x 1.2 (sedentary) = 1860 kcals (for weight maintenance)    Recommended Protein Intake: 60-80 grams of protein/day    Medical History:  Patient Active Problem List   Diagnosis     Heterozygous alpha 1-antitrypsin deficiency (H)     Iron overload     Status post liver transplantation (H)     C. difficile diarrhea     Steroid-induced hyperglycemia     Immunosuppressed status (H24)     History of liver recipient (H)     Vertigo     Otitis media     Bile duct stricture (H28)     Common bile duct stenosis (H28)     Abnormal liver function     Acquired lymphedema of leg     Chronic pain of both knees     Venous hypertension of lower extremity, bilateral     Cramp in lower extremity associated with sleep     Obesity with serious comorbidity     Edema     Hypertension, unspecified type     Gastroesophageal reflux disease without esophagitis      Hyponatremia     Hypothyroidism     Leg swelling     Lymphedema     Macrocytosis without anemia     Malaise     Fatty liver disease, nonalcoholic     Osteoarthritis of both knees, unspecified osteoarthritis type     Osteoarthritis of knee     Peripheral neuropathy     Post-menopausal bleeding     Slow transit constipation     Thrombocytopenia (H24)     Vitamin D deficiency     Zinc deficiency     Bilateral edema of lower extremity     Physical debility     Diverticulitis of colon     Suspected 2019 novel coronavirus infection     Morbid obesity (H)     S/P liver transplant (H)     Liver transplant rejection (H)     Chronic kidney disease, stage 3 (H)     Left knee pain     Status post total knee replacement     Incisional infection     Fracture of bone of left shoulder     Other hyperlipidemia        Nutrition History:   Food allergies/intolerances/cultural or religous food customs: Yes apples and fruits with core and pits  Weight loss history: currently tracking her meals via hemanth with her .  has recently been dx with pre diabetes. Lost weight when starting to track intake, limit refine carbs.       Dietary Recall:  Breakfast: Eggs, keto cereal, fruit, yogurt, juice, coffee   Lunch: Lunch meat, cheese sticks, puffcorn snacks, meat sticks, cream cheese   Dinner: Seafood, poultry, pork, beef, casserole, salad, vegetables   Typical Snacks: did not provide     Eating out: did not discuss     Beverages: did not list    Exercise: n/a    Nutrition Diagnosis (PES statement):   Morbid obesity related to excessive energy intake as evidence by patient subjective report and BMI of 41.87       Nutrition Intervention  Food and/or Nutrient Delivery   Discussed healthy fats and complex carbohydrates.  Placed emphasis on importance of developing a healthy meal routine, aiming for 3 meals a day and no snacks.  Discussed using a protein supplement as a meal replacement.  Nutrition Education   Discussed with patient  how to build a meal: the importance of including a lean/low fat protein at each meal, include a source of vegetables at a minimum of lunch and dinner and limiting carbohydrate intake to 25% per meal.  Educated on sources of lean protein, portion sizes, the amount of grams found in each source. Recommend patient to aim for 20-30g protein at each meal.  Educated on how to read a food label: keeping total fat <10g and sugar <10g per serving.  Discussed the importance of adequate hydration, with emphasis on drinking 64oz of water or zero calorie beverages per day.  Nutrition Counseling   Encouraged importance of developing routine exercise for health benefits and weight loss.    Goals established by patient:   Choose low fat and fat free dairy products  Increase protein intake aiming for ~20g protein per meal   Limit calorie containing beverages     Handouts provided:  Intro to MWM  Protein  Fats  Carb     Assessment/Plan:    Pt will follow up in 2.5 month(s) with dietitian.       Phone call duration: 26 minutes      Originating Location (pt. Location): Home      Distant Location (provider location):  Off-site    Mode of Communication:  Video Conference via [facility]    Physician has received verbal consent for a Video Visit from the patient? Yes      Anamaria Vargas RD         Again, thank you for allowing me to participate in the care of your patient.        Sincerely,        Anamaria Vargas RD

## 2024-02-29 NOTE — NURSING NOTE
Is the patient currently in the state of [place]? YES    Visit mode:VIDEO    If the visit is dropped, the patient can be reconnected by: VIDEO VISIT: Text to cell phone:   Telephone Information:   Mobile [phone]       Will anyone else be joining the visit? NO  (If patient encounters technical issues they should call [phone]    How would you like to obtain your AVS? MyChart    Are changes needed to the allergy or medication list? No, Pt stated no changes to allergies, and Pt stated no med changes    Reason for visit: Consult (KHARI)    Esha QUILES

## 2024-02-29 NOTE — PATIENT INSTRUCTIONS
Eat Better ? Move More ? Live Well    Eat 3 nutrient-rich meals each day     Don't skip meals--it will cause you to overeat later in the day!     Eating fiber (vegetables/fruits/whole grains) and protein with meals helps you stay full longer     Choose foods with less than 10 grams of sugar and 5 grams of fat per serving to prevent excess calories and weight re-gain   Eat around the same times each day to develop a routine eating schedule    Avoid snacking unless physically hungry.   Planned snacks: 1-2 times per day and no more than 150 calories    Eat protein first    Protein helps with healing, maintaining adequate muscle mass, reducing hunger and optimizing nutritional status    Aim for 60-80 grams of protein per day   Fill up on Fiber    Fiber comes from plants--fruits, veggies, whole grains, nuts/seeds and beans    Fiber is low in calories, high in phytonutrients and helps you stay full longer    Aim for 25-35 grams per day by eating fiber with meals and snacks  Eat S-[id number]    Take 20-30 minutes to eat each meal by taking small bites, chewing foods to applesauce consistency or 20-30 times before you swallow    Eating foods too fast can delay satiety/fullness signals and increase overeating   Slow down your eating by using toddler utensils, putting your fork/spoon down between bites and not watching TV or emailing during meals!   Keep a Journal          Writing down what you eat, how you feel and when you are active helps you identify new changes to work on from week to week          Look for ways to cut 100 calories from your current diet 2-3 times per day  Drink 64 ounces of 0-Calorie drinks between meals    Water    Zero calorie Propel  or Vitamin Water      SoBe [place]  Zero Calories    Crystal Light , Sugar-Free Miky-Aid , and other sugar-free lemonade or flavored cardenas    Keep Caffeine to less than 300mg per day ie: 3-6oz cups coffee     Work up to 45-60 minutes of physical activity most days of  the week    Helps with losing weight and prevent regaining those extra pounds!     Do a combo of cardio (walking/water exercises) and strength training (lifting weights/Vinyasa yoga)    Avoid Mindless Eating    Be present when you eat--take note of the smell, taste and quality of your food    Make a list of alternative activities you could do to prevent eating out of boredom/stress  Go for a walk, call a friend, chew gum, paint your nails, re-organize the garage, etc      LEAN PROTEIN SOURCES    Protein Source Portion Calories Grams of Protein                           Nonfat, plain Greek yogurt    (10 grams sugar or less) 3/4 cup (6 oz)  12-17   Light Yogurt (10 grams sugar or less) 3/4 cup (6 oz)  6-8   Protein Shake 1 shake 110-180 15-30   Skim/1% Milk or lactose-free milk 1 cup ( 8 oz)  8   Plain or light, flavored soymilk 1 cup  7-8   Plain or light, hemp milk 1 cup 110 6   Fat Free or 1% Cottage Cheese 1/2 cup 90 15   Part skim ricotta cheese 1/2 cup 100 14   Part skim or reduced fat cheese slices 1/4cup, 3 dice 65-80 8     Mozzarella String Cheese 1 80 8   Canned tuna, chicken, crab or salmon  (canned in water)  1/2 cup 100 15-20   White fish (broiled, grilled, baked) 3 ounces 100 21   [place]/Tuna (broiled, grilled, baked) 3 ounces 150-180 21   Shrimp, Scallops, Lobster, Crab 3 ounces 100 21   Pork loin, Pork Tenderloin 3 ounces 150 21   Boneless, skinless chicken /turkey breast                          (broiled, grilled, baked) 3 ounces 120 21   Halliday, Rich, [place]osh, and Venison 3 ounces 120 21   Lean cuts of red meat and pork (sirloin,   round, tenderloin, flank, ground 93%-96%) 3 ounces 170 21   Lean or Extra Lean Ground Turkey 1/2 cup 150 20   90-95% Lean Hollywood Burger 1 russ 140-180 21   Low-fat casserole with lean meat 3/4 cup 200 17   Luncheon Meats                                                        (turkey, lean ham, roast beef, chicken) 3 ounces 100 21   Egg (boiled,  poached, scrambled) 1 Egg 60 7   Egg Substitute 1/2 cup 70 10   Nuts (limit to 1 serving per day)  3 Tbsp. 150 7   Nut [place] (peanut, almond)  Limit to 1 serving or less daily 1 Tbsp. 90 4   Soy Burger (varies) 1  10-15   Edamame  1/2 cup ~95 9   Garbanzo, Black, Wilder Beans 1/2 cup 110 7   Refried Beans 1/2 cup 100 7   Kidney and Lima beans 1/2 cup 110 7   Tempeh 3 oz 175 18   Vegan crumbles 1/2 cup 100 14   Tofu 1/2 cup 110 14   Chili (beans and extra lean beef or turkey) 1 cup 200 23   Lentil Stew/Soup 1 cup 150 12   Black Bean Soup 1 cup 175 12     Carbohydrates  Carbohydrates fuel your body with glucose (sugar)--the energy your body needs so you can do your daily activities.  Carbohydrates offer an immediate source of energy for your body. They provide the fuel for your muscles and organs, such as your brain.     Types of Carbohydrates     Complex Carbohydrates are higher in fiber and keep you feeling full longer--helping you eat less.   These are found in nearly all plant-based foods and usually take longer for the body to digest.  They are most commonly found in whole-wheat bread, whole-grain pasta, brown rice, starchy vegetables,   and fruits  Refined Carbohydrates require almost NO WORK for digestion and break down into glucose more quickly   than complex carbohydrates. Refined carbohydrates are usually high in calories and low in nutrients and fiber--  eating more of these can lead to weight gain.  Thinking about eliminating carbohydrates???  If you do not eat enough carbohydrates, the following can occur:  Fatigue  Muscle cramps  Poor mental function  Fatigue easily results from deprivation of carbohydrates, which is seen in people who fast, possibly interfering with activities of daily living.      Thinking about eliminating carbohydrates???  If you do not eat enough carbohydrates, the following can occur:  Fatigue  Muscle cramps  Poor mental function  Fatigue easily results from deprivation of  carbohydrates, which is seen in people who fast, possibly interfering with activities of daily living    Carbohydrates are your body's first choice for fuel. If given a choice of several types of foods simultaneously, your body will use the energy from carbohydrates first.    What foods contain carbohydrates?  Choose the following foods containing carbohydrates (the BEST ones to eat):   Fruit-fresh, frozen, canned in their own juices  Whole grains:  Whole-wheat breads  Brown rice  Oatmeal  Whole-grain cereals  Other starchy foods containing a minimum of 3 grams (g) fiber/100 calories  The ingredient label should list whole wheat or whole grain as one of the first ingredients (bulgur, quinoa, buckwheat, millet, spelt, faro, kasha)  Milk or yogurt (a natural source of carbohydrates):  Low-fat milk  Fat-free milk  Yogurt   Beans or legumes     Starchy vegetables, raw or frozen:  Potatoes  Peas  Corn    AVOID or limit the following foods containing carbohydrates:  Refined sugars, such as in:  Candy  Desserts-ice cream, cakes, pies, brownies, frozen yogurt, sherbet/sorbet  Cookies  White flour: bread/pasta/crackers/rice/tortillas  Sugary snacks: sweetened cereal, granola bars, cereal bars, donuts, muffins, bagels  Sugary Drinks:  Fruit Juice, Smoothies  Sports Drinks  Regular Soda    What are typical serving sizes or portions?  The following are some serving and portion sizes for foods containing carbohydrates:  One medium piece of fruit, about 4?5 ounces (oz) (-tennis ball)  1 cup (C) berries or melon    C canned fruit    C juice (100% vegetable)    C starchy vegetables, cooked or chopped  One slice whole-grain bread  ? C brown rice, quinoa, buckwheat, millet, spelt, faro, kasha    C oatmeal (dry)    C bulgur  One small tortilla (less than 6inch diameter)    C wheat germ  1 oz pretzels     C flaked cereal        Calorie-Controlled Sample Meal Plans    Examples of small healthy meals    Breakfast   Omelet made with   cup  to   cup egg substitute or 2 eggs    cup chopped vegetables  1-2 tbsp. of light cheese     cup salsa  Medium banana    1 cup non-fat plain, Greek yogurt mixed with 1 cup berries and 1-2 Tbsp nuts or cereal   -3/4 cup skim or 1% cottage cheese    cup unsweetened whole-grain cereal  1/2 cup of fresh strawberries  Whole-wheat English muffin or mini bagel, 1 scrambled egg and 1 slice Swiss cheese   Small orange  Protein Bar or Shake (15-30 grams protein and 15-25 grams Carbohydrates)    cup cottage cheese, low-fat    cup fresh fruit    11 ounces of Slim Fast Low Carb (only), Donald's Advantage, EAS Carb Control    Lunch/Dinner  2-3 slices roasted turkey breast  1 tbsp. of fat free mayonnaise  2 slices of  whole-wheat bread, Medium apple  10 baby carrots with 1 tbsp. of low-fat dip     cup water packed tuna or chicken  1 tablespoons of low-fat mayonnaise  1-2 tbsp. dill relish  1 serving of whole-grain crackers  1 cup of strawberries   6 inch turkey sub sandwich with light mayonnaise,   cup cottage cheese                                                                                                                                                      Black bean and low-fat cheese on a whole wheat tortilla with salsa and light sour cream  Grilled chicken sandwich  Tossed salad with light dressing    Baked potato with 3/4 cup of extra lean ground beef, light shredded cheese and salsa  Fresh fruit                                                 Chicken chunks with lettuce and vegetables stuffed in cristopher  Steamed broccoli                                                 3 oz boneless/skinless chicken breast  1/2 cup brown rice with stir-fried vegetables    grapefruit  3 ounces of salmon, trout, or tuna  1 cup of steamed asparagus  1 small slice whole grain Italian bread  Broiled white or pink fish  3/4 cup whole wheat pasta with tomatoes  3/4 cup of roasted red peppers  3 oz. of extra lean (93/7) hamburger on a [employer]  [employer] Thins  Tossed salad with light dressing       Black bean or Tuscan bean soup with grated mozzarella cheese    of a flour tortilla    3 ounces of grilled pork loin with 1 tbsp. of low-sugar barbeque sauce, 1 cup of green beans seasoned with pepper  Small dinner roll or   cup of grapefruit sections    1-2 cups of torn kadeem    cup of garbanzo beans or diced skinless chicken breast  5-6 cherry tomatoes  1  tbsp. of crumbled feta cheese  1 tbsp. of roasted soy nuts  1 tsp. of olive oil and 2-3 Tbsp. of balsamic or red wine vinegar  Small whole-wheat dinner roll or   cup of cut up pineapple

## 2024-02-29 NOTE — PROGRESS NOTES
Virtual Visit Details    Type of service:  Video Visit     Originating Location (pt. Location): Home    Distant Location (provider location):  On-site  Platform used for Video Visit: Giorgi

## 2024-02-29 NOTE — PATIENT INSTRUCTIONS
"Plan:  Welcome to weight loss season. For current activity levels, aiming for 1300-1350kcal/day with 70 grams of lean protein daily and 80 oz of water daily should set you up for good weekly weight reduction. If ramping fitness/yard chores/packing the house to move, I'd aim for 1400-1425kcal/day and 75g of lean protein daily.     2. Follow up as planned with dietician.    3. Continue use of tracking hemanth. Portion carefully/mindfully and record everything entering your body. Morning daily weight after emptying your bladder should also be recorded.     4. If tolerated well and available, ramp up Wegovy therapy. I've sent the starting 3 doses and it could take some time for your pharmacy to get these.  If  a month has past, let  me know or you can ask your pharmacist if Saxenda is available instead and if so, let us know: [phone] is the nurse line.    5. Continue aiming to get 2-3 ten minute walks daily and start up morning chair yoga.     6. Wegovy ramps from 0.25mg/week for 4 weeks to 0.5mg/week for 4 weeks then 1mg/week for 4 weeks then 1.7mg/week for 4 weeks then 2.4mg/week for about 6-9 months if working well/well tolerated.  See handout below.    7.  Follow up TSH levels with Dr. Thurston or your endocrine clinic it looks like recent adjustments were made after a bit of excess support. Normal levels are the goal.         What makes a person succeed with dramatic and sustained weight loss?    It's being at the right point in your life where you feel the need to lose the weight, not because anyone told you so but because of a voice inside of you that says, \"I am ready for this\".  You're now at a point where you may be feeling anxious, irritable and when you look in the mirror you do not recognize the person looking back.  Your healthy self is buried somewhere in that reflexion and you want to free it again.  This is the sort of motivation that leads to success, and it comes from you.    Because the only person " "that can lose that extra weight is you, I like my patients to focus on the mindset of being in Weight Loss Season.  This gives you permission to make the changes necessary to be consistent with the diet/activity and behavior changes that lead to successful, healthy weight loss.  Nearly any diet plan can work for weight loss, but keeping it healthy and nutrient based to prevent deficiencies/hair loss/fatigue or irritability is vital.  If you have a plan you want to try, we'll work with you to make sure no adjustments are needed to keep you healthy through your weight loss season and working with our Bariatric Dieticians you'll be given expert guidance to customize your diet plan to suit your particular needs. If you don't have your own ideas in mind, we are always happy to suggest well researched and validated plans that provide enough food to prevent hunger but still tap into your excess fat reserves and lose weight in a sustainable fashion.  There is great evidence that lean protein/healthy fat intake with good fiber intake while minimizing simple starches/carbs produces reliable/sustainable weight loss in most people. But some feel more connected to an intermittent fasting/fast mimicking or ketogenic diet.  These protocols can be hard for many to stick with and that's why we prefer the protein/healthy fat focused diets but if these alternative strategies appeal to you, we can work with you to optimize your knowledge and results with these tools.    Losing weight is a temporary commitment, but you need to be \"All In\" to have a good weight loss season.  To avoid frustration, you have to be willing to be on track 19 out of 20 days or even better than that. But, weight loss season is generally only 4-8 months in length. After that length of time, it can be hard to maintain a negative calorie balance and our brain, motivations and metabolism will usually bring you to a plateau that cannot be broken in this modern world " "where other commitments start to take priority. That's when we look to stabilize the weight loss you've achieved.  If you've reached your goal by that time, fantastic, and job well done.  If there is more to go, then after a few months of stabilization, we can usually attack that previous plateau and break into new territory.    Because of this time limitation, we want to really get to work right away and get into a sustainable routine ASAP.  One of the best predictors of how much weight you're going to lose throughout the season is how much you lose in the first 6 weeks, so prepare well and jump in with both feet.      Occasionally, people may feel like they cannot commit fully to the changes necessary and may want to change one thing at a time and \"get used to\" the idea of losing weight.  That is OK because that is where they are in their life, and they cannot fully commit for any number of reasons.  It's part of that internal motivation and they just haven't reached PRIORTY NUMBER ONE status yet. It's possible that what they need is more time to reach that point and I am always willing to work with people that want to \"dabble\", but understand, the amount of success obtained with half measures, is much less than half results. Behavior Change cannot occur until we prepare our minds, bodies and environment for what is to come, action!    As you go through your plan, look for things to keep your motivation rolling.  The most successful people have a goal or target/reward that they are working towards.  Having a reward that celebrates your new fitness, mobility and energy is the best sort because it will encourage you to do well with the weight maintenance phase and long term lifestyle changes that promotes keeping the weight off for the long term.  Usually, \"getting healthier\" or improving blood tests or losing weight so your clothes fit better is not as internally motivating as having a tangible reward.  A good " weight loss season reward is one that isn't food based, is affordable, but something special:  Something you won't be getting/doing unless you achieve your goal.   It s important to keep to the rule of success:  in order to get the reward, your goal MUST be achieved. Write this reward down, where you can look at it daily and keep it in the front of your mind as you go through your weight loss season and it will help keep you on track.    Tools that help change behavior are vital for success. The most studied and most supported tool for weight loss is nothing more than writing down your food and weight every day.  Every Day.  Accurately and completely.  When you commit to weight loss season, this information tells you whether you're getting ENOUGH food to fuel your weight loss properly as well as teaches you the interaction between different foods you eat and how your body responds with weight loss.  You'll see that sometimes after a heavy workout you don't see the scale move until 2-3 days later.  How saltier meals (chili for example) may make you retain water for 4-5 days before you see the weight come off, you'll get used to the mini-plateaus that develop after a good 3-8 lb drop in weight as well as how you break through if you keep working the diet as you should.    Weight loss is not a linear process, there are mini ups and downs.  Learning how your body loses weight and getting comfortable with that is very rewarding. The act of writing words on paper also solidifies your will power and commitment to the season of weight loss and that by itself changes your brain chemistry/appetite, motivation and prepares you for maximal success.     Behavior change is all about getting into a new routine.  The old habits and routine have to change because without changing the circumstances of how you gained your weight, it's unlikely you'll enjoy satisfying results. If you have snacking habits, like every time you walk  "through the kitchen you grab a little something, well, that habit has to change and be replaced by a new habit.  It can be something as simple as keeping a doodle pad on the counter that you make a few scribbles and then walk through the kitchen having not opened the cupboard, or starting with a glass of water and leaving the kitchen without anything else, or checking your food journal to see how many calories you have left for the day.  Boredom is the enemy as are the old habits. Break new ground and try to push those old habits into a deep hole.  There are apps/counseling options available that can help with some of the day to day urges/behaviors if you're struggling. One commercial product that does a good job is [employer].  Unfortunately, there is a subscription fee.    Finally, exercise always helps.  While not mandatory to lose weight, every little bit helps and exercise has so many other benefits that to not work it into your plan is to miss out on all the mood, sleep, stress and general health benefits that come from making yourself a little short of breath and sweaty at least 3-4 days out of the week.  The metabolism and calorie burn benefits aside, almost every chronic ailment in medicine gets better with proper, aerobic exercise.  Allow yourself to start slow and let your body prepare itself to accept harder training 4-6 weeks down the road, but start now and commit to a plan.  Whether you have the means to hire a , join a gym or just walk out your front door or go down to your basement for a video workout, get into a exercise routine and  after 3 weeks of at least 3 times a week exercise you should be at a point where you can slowly start ramping up 10% each week to our goal of at least 150-300minutes weekly of aerobic exercise and at least twice weekly resistance training/strenghtening with weights/bands or body weight exercises.     I am a big fan of modifying the free training plan, \"Couch to 5k\", " "for almost all of my patients. Just type it into [employer] or look it up on your smart phone hemanth store.  To modify the plan,  you can use the training plan for whatever aerobic activity you do (bike/treadmill/elliptical/rower/pool/etc). During the \"jog\" intervals, you just move a little faster or harder or increase the tension or incline.  You use those little intervals to switch up the workout and recruit more muscles and pump the blood a little more and then recover again in the \"walk\" intervals by slowing down, decreasing the incline or turning down the tension.  3-4 days a week is not that much to ask and the benefits are enormous.  Start slow and develop the base from which you can then build on and reduce the risk of injury.  It's much more important 2-4 months from now to be enjoying your exercise then it is to over exert yourself at the start and hurt yourself.  Starting slowly allows your body to accept the training better down the road when the exercise becomes crucial for weight maintenance.  Without exercise down the road during your maintenance phase, all this hard work you are about to put in can be undone. It usually takes about 100-300 calories a day of exercise to maintain a weight loss and our focus during weight loss season is to generate the routine/activities and hobbies that make that enjoyable/sustainable.    Thanks for taking this first and most important step in your weight loss season.  Commit to it and we will cheerlead you all the way to success.  When things get tough or off track we'll offer guidance and analysis and when you reach your goal we'll celebrate your success.  In the end, it is all about your success, your health and what you do with it.      Darron Andujar MD  [facility]  [phone]        LEAN PROTEIN SOURCES  Getting 20-30 grams of protein, 3 meals daily, is appropriate for most people, some need more but more than about 40 grams per meal " is not useful.  General rule is drinking one ounce of water per gram of protein eaten over the course of the day:  70 grams of protein each day, drink 70 oz of water.  Protein Source Portion Calories Grams of Protein                           Nonfat, plain Greek yogurt    (10 grams sugar or less) 3/4 cup (6 oz)  12-17   Light Yogurt (10 grams sugar or less) 3/4 cup (6 oz)  6-8   Protein Shake 1 shake 110-180 15-30   Skim/1% Milk or lactose-free milk 1 cup ( 8 oz)  8   Plain or light, flavored soymilk 1 cup  7-8   Plain or light, hemp milk 1 cup 110 6   Fat Free or 1% Cottage Cheese 1/2 cup 90 15   Part skim ricotta cheese 1/2 cup 100 14   Part skim or reduced fat cheese slices 1 ounce 65-80 8     Mozzarella String Cheese 1 80 8   Canned tuna, chicken, crab or salmon  (canned in water)  1/2 cup 100 15-20   White fish (broiled, grilled, baked) 3 ounces 100 21   [place]/Tuna (broiled, grilled, baked) 3 ounces 150-180 21   Shrimp, Scallops, Lobster, Crab 3 ounces 100 21   Pork loin, Pork Tenderloin 3 ounces 150 21   Boneless, skinless chicken /turkey breast                          (broiled, grilled, baked) 3 ounces 120 21   [place], [place], [place], and Venison 3 ounces 120 21   Lean cuts of red meat and pork (sirloin,   round, tenderloin, flank, ground 93%-96%) 3 ounces 170 21   Lean or Extra Lean Ground Turkey 1/2 cup 150 20   90-95% Lean [place] Burger 1 russ 140-180 21   Low-fat casserole with lean meat 3/4 cup 200 17   Luncheon Meats                                                        (turkey, lean ham, roast beef, chicken) 3 ounces 100 21   Egg (boiled, poached, scrambled) 1 Egg 60 7   Egg Substitute 1/2 cup 70 10   Nuts (limit to 1 serving per day)  3 Tbsp. 150 7   Nut [place] (peanut, almond)  Limit to 1 serving or less daily 1 Tbsp. 90 4   Soy Burger (varies) 1  15   Garbanzo, Black, Wilder Beans 1/2 cup 110 7   Refried Beans 1/2 cup 100 7   Kidney and Lima beans 1/2 cup 110 7    Tempeh 3 oz 175 18   Vegan crumbles 1/2 cup 100 14   Tofu 1/2 cup 110 14   Chili (beans and extra lean beef or turkey) 1 cup 200 23   Lentil Stew/Soup 1 cup 150 12   Black Bean Soup 1 cup 175 12           To help lose weight in a safe way and sustainable way, I'd like to start you on a 1300 calorie diet each day.  Understanding that every 3500 calorie deficit adds up to a pound of weight reduction, with the combination of light aerobic exercise, some modest weight training and diet, we should be able to lose around 1 to 2.5lbs weekly depending on your starting height and weight and exercise routine with this goal intake. Dropping abut 1% total body weight weekly is exceptional weight loss and very sustainable once in a good routine.    Hunger and fatigue are the enemies of weight loss and behavior change in general.  We become much more reactive in our eating when we're hungry and tired and decrease our levels of self control.  It's not a personal failing, it's just body chemistry.   We can combat this by trying to avoid being tired and hungry at the same time.  To help this,  try to front load your calories in the first 10 hours of you day so you get into the fatigued evening hours reasonably full and you can control impulses/mindless eating a lot better and avoid those bedtime snacks/evening treats that the tired brain craves.  If you can getting your exercise in the beginning of your day has also been shown to have superior results (but anytime is better than none).  We want to build up to 150 minutes or more as you progress through the first 2-3 months of weight loss season (300 minutes weekly is ideal for maintaining weight loss for most after the end of weight loss season).     Weight loss goes through ups and downs and plateaus but if you stay on the program, you will enjoy success.   Commit to the process, try to be on track at least 19 days out of 20 and continue to think about why you're doing this and  what you're working towards. If you haven't thought of your reward for hitting your weight loss goals, think about it now. Using these little victory bribes along the way helps a lot.    Finding a diet that is satisfying and repeatable-- day in and day out, improves success.  The following uses the concept of Daily Caloric Restriction, eating less every day.  An outline of how to break up the day's food is given below.  It is a starting point and example of what a 1300 calorie diet looks like.  You can modify the listed foods to suite your particular tastes, but pay attention to portions and protein content.   Do not skip breakfast on this plan as it will leave you hungry and lead to overeating at some point during the rest of the day.  If you can make supper the last food for the day more days of the week then not, it will help a lot.  That means that the intake during those first 10-12 hours of the day and hitting your protein/intake targets for all three meals is vital to your success and evening hunger control.     Start reading labels so you know that you're getting what you think you are and start measuring foods so you can eventually look at a portion and understand how it will provide the fuel you want.    Prepping raw veggies after you buy them (washing and putting into bags/tupperware for easy access), cooking several chicken breasts/proteins for the next 2-3 lunches and generally being able to grab and go what will keep you on target when time is short will greatly aid your success.  Prepare and plan ahead and success will follow. It really only requires a couple days weekly to optimize the access to the right food at the right time of day.  Food delivery and stopping at fast food is a sign of reactive eating and usually will signal a stalling of your weight loss.    Read labels:  for protein portions/yogurt, protein bars etc looking for items with more than 10 grams of protein and less than 10 grams of  sugar is very helpful.  Frozen meals should have at least 18 grams of protein, under 10 grams of sugar as well (typically around 300-380 calories).    Please note: if you've had previous bariatric surgery: wait 20-30 minutes before/after eating to drink your beverages to avoid early fullness/dumping syndrome/worsening malabsorption, early loss of fullness and hunger.  Start with eating your protein first, slow down your meals and chew thoroughly.          1300 calorie diet:  Think Big Breakfast, Medium Lunch, smaller dinner.  Note: it's OK to consolidate the calories/protein of the mid morning snack with breakfast and the midafternoon snack with lunch if time doesn't allow or if your don't wish to have those snacks. No snacks recommended after supper. If you're prone to late afternoon nibbling, that is a great time to get your fitness in so you can get to supper without the extra.    Breakfast goal of 300 calories-350 calories (egg, 1/3 to 1/2 cup cooked old fashioned oatmeal or steel cut oats and berries,3-4oz greek yogurt.)Glass of water and if you like coffee (black) or tea.        Mid morning Snack or part of lunch, about 2-2.5 hrs later: 100 calories (cottage cheese/string cheese/ fruit or banana) and a handful of cruchy/green veggies (cucumber/celery/green peppers/broccoli).  Small glass of water    Lunch:  300 calories (3.5-4 oz tuna with little hull (no bread), apple, salad with drizzle of olive oil/balsamic vinegar for example)  Water    Snack:  100 calories.  4-5 oz Greek Yogurt with at least 17 grams of protein per serving.    Or Cottage cheese (lower sodium version preferable). Get this in about 2 hrs before you plan to have dinner. Protein drinks with at least 15-20 grams of protein and less than 6 grams of sugar could be used here to hit protein goals and decrease afternoon/evening hunger.  Glass of water    Dinner:  350 calories (4 oz meat or fish with cooked veggies or salad with minimal dressing,  one piece of bread).  Glass of water or unsweetened tea with lemon  Dessert: 100 calories Medium Apple or Small handful of nuts, about 2/3 of an ounce (almonds/walnuts/cashews or pistachios are ideal).   Glass of water.    Try to avoid all soda and juice (low sodium V8 ok once a day).   You can do it! Embrace the healthier you and give up the inflammatory sugary treats that accelerate disease.    Choose an activity that is fun/interesting and available to you such as  Going for a swim for 15 minutes, walking 40 minutes, elliptical 20 minutes or cycling 20 minutes as many days a week as you can.  Having a walking or workout buddy can make it even more enjoyable and keep you on track the days your motivation/energy may be lower.  If those times are too long for your fitness level, start at 10 minutes of movement and each week try to increase by 2 minutes each week.  The first 70 minutes a week (10 minutes a day only) of exercise drops the mortality rate of a sedentary person 30%!!!!  Our goal is to work up to 150 minutes or more per week of moderate to vigorous exercise  to optimize metabolism and prevent weight regain during maintenance. If time is short and your fitness allows it, high intensity interval training can be a nice way to cut the workout time in half:  warm up 2-4 minutes then 3-6 intervals of increased intensity effort (70% of max heart rate) followed by an equal amount or more of recovery before repeating, then 1-3 minutes of cooldown.     10 minutes of weight lifting can be helpful as well or using some body weight exercises like wall squats, pushups (with assistance as needed or standing/wall pushups), seat presses, yoga moves. At least twice weekly helps maintain a good strength to weight ratio, more days is better.  [employer] is a great resource for free video demonstrations.    If you are into strenuous weight lifting or prolonged exercise, use an online calculator for how many calories you've  "burned and if your exercise is lasting over 60 minutes, replace 20-30% of those calories burned immediately after exercise .  For the more limited exercise (less than 500 calories burned), there is no need to add extra food unless you notice a lot of hunger on your food journal.  Usually  Even a  calorie load after longer workouts is more than adequate.  For longer efforts, hunger will increase if you don't refuel afterwards and can get meal plan off track due to hunger, so replenish immediately after the workout to keep on the diet plan and feeling good.    Exercise example:  If you burned 1000 calories during the exercise, immediately (within 30 minutes) have a snack/replacement beverage totaling 200-300 calories and ideally have a 3:1 ratio of carbohydrate grams to protein grams to keep muscles ready for exercise the next day.  Have your next, full meal within 2-3 hours of exercise.    Tips for success:  KEEP A FOOD JOURNAL and a log of daily weights.  Pencil and paper works fine for most. Otherwise, [employer], [employer], [employer], [employer], [employer] are all good tracker apps/programs or websites for food/fitness.  Remembering to ask yourself, \"How did my nourishment affect me today\" and comparing \"good days\" to \"hungry days\" to solidify what helps your body, in your life, feel it's best while losing weight.    1.  Prepare proteins ahead of time (broil chicken breasts in bulk so you can grab and go), steel cut oats can be stored in casserole dish/bowl in the fridge for quick scoop in the morning and rewarm in microwave, make use of crock pot recipes (watch salt content).    2.  Drink a 8-12 oz glass of water every 2-3 hours when awake.  We often mistake hunger for thirst, especially when losing weight.    3. Remember your Reward and Motivation when things get hard.    4.  Weigh yourself every morning and record, you'll stay on track better and learn how your body loses weight. Don't worry about 1 or 2 day " "patterns, but when on track you'll notice good trend downward of weight over 3-4 day segments.    5. Call or use [employer] messaging if problems/concerns .    6.  Find a handful of meals/foods that keep you on track and get into a boring routine that is sustainable for you.    7.  Take a complete multivitamin just to make sure all micronutrients are adequate during weight loss.    8. If losing hair/brittle nails it often means you are not taking enough protein.  Minimum goal is 60 grams daily of protein, most people with normal kidneys do well with upwards of 100-120 grams/day of protein. Consider taking Biotin as supplement or a \"Hair and Nail\" multivitamin.  If you are hypothyroid and losing hair, see you doctor for a check up of your levels if you haven't had one recently.    9.  Getting adequate sleep is very important for starting your day properly, when we are sleep deprived, our morning appetite is suppressed and without eating an adequate breakfast, we overeat later in the day when we're tired.  Our body heals in our sleep and our mental and immune health depends on this rest.  Aim for 7-8 hours of sleep nightly if possible.  If you sleep is disturbed, perform some introspection on stressors/depression/anxiety/PTSD events or possible sleep disorders and we can trouble shoot solutions/evaulations if issues persist.    Exercise during the day, meditative breathing before bed and after waking and removing the television from the bedroom are easy ways to improve quality of sleep.      10. Relaxation.  Controlled breathing exercises can lower stress levels in the brain.  One technique is 4, 7, 8 breathing:  Place the tip of your tongue behind your front teeth, breath in through your mouth for 4 seconds, Hold it for 7 seconds and breath out slowly, making a leaky tire noise for 8 seconds.  Repeat 4 times.  Ideally do at the start and end of your day or if feeling stressed.  It works and it's why " meditation/yoga/martial arts are often very breath based activities.  There are breathing techniques for alertness as well as relaxation out there and they can be quite helpful.      On-the-Go Breakfast Ideas  As of 2015, the latest research shows what a huge impact eating breakfast has on losing weight and feeling your best. People lose more weight when they make breakfast their biggest meal of the day compared to Dinner, but even if you cannot go to that degree, getting a breakfast that has at least 20 grams of protein and even a moderate amount of fat is ideal for maintaining good energy through the day and limits overeating in the evening hours.  The following are some quick and easy suggestions for at least getting something of substance into your body in the morning.  Enjoy!    Eating breakfast within 90 minutes of waking up is an important part of taking care of your body on a restricted calorie diet plan.  After sleeping for hours, your body is in need of fuel.  An ideal breakfast is a combination of protein, whole-grain carbohydrates, or fruit.  Here s why:    -Protein digests very slowly in the body, helping you feel more satisfied.  -Whole grains provide dietary fiber, which also digests slowly and helps keep your gut clean.  -Fruit is a great source of vitamins, minerals, and fiber.     Each one of these breakfast combinations has between 200-300 calories and 15-20 grams of protein.  Feel free to mix and match!    Bone Broth (chicken bone broth or beef bone broth) is a great way to boost protein content. 8oz of bone broth will typically have 9-12grams of protein for 40kcal of energy.    Protein: Choose  -1/2 cup low-fat cottage cheese  -2 hard boiled eggs , or one cooked in olive oil (low/slow heat).  -1 low fat string cheese stick  -1 Tablespoon natural peanut butter  -myParcelDelivery vegetarian sausage russ (found in freezer section)  -1 slice lowfat cheese  -6 oz 2% or lowfat Greek yogurt, such as  Fage or Oikos.    PLUS    Whole Grains:  Choose   -1 whole wheat English muffin  -1 whole wheat cristopher, half  -1/2 Fiber One frozen muffin, thawed  -1/2 Fiber One toaster pastry  -1 whole wheat bagel thin  -1/2 cup Kashi cereal  -1 Kashi waffle (or other whole grain high-fiber waffle)  Aim for whole grain/sprouted breads with at least 3g of fiber/slice if having bread. [place] is one such brand.    OR    Fruit: Choose  -1/3 cup blueberries  -1/2 banana (or a plantain- similar to a banana, yet smaller)  -1/2 cup cantaloupe cubes  -1 small apple  -1 small orange  -1/2 cup strawberries  -handful raspberries/blackberries (each berry is about 1 calorie).    *Adapted from [employer], Fall 20    Ten Breakfasts Under 250 calories    Ideally, getting between 350-600 calories  (depending on starting height and weight)for breakfast is ideal for avoiding hunger later in the day, adjust/add to the following accordingly:    One- 250 calories, 8.5 g protein  1 slice whole-grain toast   1 Tbsp peanut butter    banana    Two- 250 calories, 8 g protein    cup nonfat/lowfat yogurt  1/3rd cup diced no-sugar peaches  1/3rd cup cereal (like Special K, Cheerios, or bran flakes)    Three- 250 calories, 25 g protein  1 egg scrambled with 1 oz skim milk    cup shredded cheddar    whole grain English muffin  1 oz Ivorian irving  1 tsp margarine spread    Four- 225 calories, 25 g protein  1/2 cup Kashi Go-Lean cereal    cup skim milk mixed with 1 scoop Bariatric Advantage protein powder    cup no-sugar diced pears    Five- 250 calories, 20 g protein    cup oatmeal prepared with skim milk, 1 scoop protein powder, and sugar-free maple syrup    Six- 200 calories, 5 g protein  1 whole grain waffle, toasted  1 tablespoon creamy peanut or almond butter    Seven-  250 calories, 19 g protein  Breakfast sandwich: 1 slice whole grain toast, cut in half.  Add 1 scrambled egg and one slice cheddar  cheese.    Eight-  250 calories, 15 g protein  2  eggs scrambled with 1/3 cup frozen spinach (heat before adding to eggs) and 2 tablespoons low fat cream cheese.    Nine-  150 calories, 15 g protein  2/3rd cup cottage cheese    cup cantaloupe    Ten- 200 calories, 20 g protein  Fruit smoothie made with 4 oz. nonfat Greek yogurt,   cup berries, 1 scoop protein powder, and 4 oz skim milk.    Ten Lunches Under 250 Calories    Aim for lunch to be around 300-400 calories a day when trying to lose weight and get that protein in!    One- 200 calories, 11 g protein  1/3 cup tuna salad made with light hull on 1 slice whole grain bread  1 small peeled apple    Two- 250 calories, 16 g protein  1/3 cup lowfat cottage cheese    cup cooked green beans    small fruit cocktail (in natural juice)    Three- 200 calories, 11 g protein    grilled cheese sandwich on whole grain bread with lowfat cheese  2/3rd cup of tomato soup    Four- 250 calories, 22 g protein  Deli wrap: 1 oz sliced turkey, 1 oz sliced ham, 1 oz sliced chicken rolled up with 1 slice low-fat cheese  1 small orange    Five- 250 calories, 28 g protein  2/3rd cup chili with 1 oz shredded cheese  4 saltine crackers    Six- 250 calories, 22 g protein  1 cup fresh spinach with 2 oz chicken, 1/3rd cup mandarin oranges, and 2 tablespoons sliced almonds with 1 tablespoon  vinaigrette dressing    Seven- 200 calories, 11 g protein  1 Tbsp sugar-free preserves and 1 Tbsp peanut butter on 1 slice whole grain toast    cup nonfat/lowfat Greek yogurt    Eight- 250 calories, 18 g protein  1 small soft-shell chicken taco with 1 oz shredded cheese, lettuce, tomato, salsa, and 1 Tbsp light sour cream    cup black beans    Nine- 225 calories, 13 g protein  2 ounces baked chicken  1/4 cup mashed potatoes    cup green beans    Ten- 200 calories, 21 g protein  Deli cristopher: 2 oz roast beef or other deli meat with 1 tsp Hamlet mayonnaise and sliced tomato, onion, and lettuce  1/3rd cup cottage cheese      Ten Dinners Under 300 calories    If  you're eating a large breakfast and medium lunch, keep dinner small.  300-400 calories is ideal for most people depending on their caloric needs.    One- 300 calories, 12 g protein  1-inch thick slice of turkey meatloaf    cup baked butternut squash    Two- 200 calories, 9 g protein  Bread-less BLT: 3 slices turkey irving, sliced tomato, wrapped in a large lettuce leaf    cup peeled fruit    Three- 275 calories, 36 g protein  3 oz roasted chicken    cup cooked broccoli    cup shredded cheddar cheese    cup unsweetened applesauce    Four- 200 calories, 25 g protein  3 oz baked tilapia  1/3rd cup cooked carrots    cup yogurt    Five- 250 calories, 20 g protein  Grilled ham  n  Swiss: spread 2 tsp ghee or butter on 1 slice of whole grain bread.  Cut bread in half, layer 2 oz deli ham with 1 piece of Swiss cheese and grill until cheese is melted.    cup cooked vegetables    Six- 250 calories, 18 g protein  Vegetarian cheeseburger: 1 [place] cheeseburger topped with lettuce, onion, tomato, and ketchup/mustard    cup sweet potato fries    Seven- 250 calories, 18 g protein  Pork pot roast: 2 oz roasted pork loin, 1/3rd cup roasted carrots,   medium potato, cooked with   cup gravy    Eight- 330 calories, 25 g protein  2 oz meatballs (about 2 small meatballs)    cup spaghetti sauce  1/2 piece toast topped with 1 tsp ghee or butterand topped with garlic powder, toasted in oven    Nine- 250 calories, 16 g protein  Mexican pizza: one 8  corn tortilla topped with 2 oz chicken,   cup salsa, 2 tablespoons black beans, 2 tablespoons shredded cheese.  Bake until cheese is melted.    Ten- 250 calories, 22 g protein  Shrimp stir-nino: 3 oz cooked shrimp, 1/6th onion,   pepper,   cup chopped carrots sautéed in 1 tablespoon olive oil, topped with 2 tablespoons stir nino sauce and a pinch of sesame seeds        150 Calories or Less Snack Ideas   1 hardboiled egg with   cup berries  1 small apple with 1 hardboiled egg  10 almonds with   cup  berries  2 clementines with 1 light string cheese  1 light string cheese with   sliced apple  1 light string cheese wrapped in 2 slices of turkey  4 100% whole wheat crackers (e.g. Triscuit) with 1 light string cheese    c. cottage cheese with   cup fruit and 1 Tbsp sunflower seeds     cup cottage cheese with   of an avocado     can tuna fish with 1 cup sliced cucumbers     cup roasted garbanzo beans with paprika and cayenne pepper    baked sweet potato with   cup chili beans or   cup cottage cheese  2 oz. nitrate free turkey slices with 1 cup carrots  1 container (6 oz) of low sugar (less than 10 grams of sugar) greek yogurt   3 Tablespoons of hummus with 1 cup sliced bell peppers   2 Tablespoons of hummus with 15 baby carrots  4 Tablespoons ranch dip made with plain Greek Yogurt and 3 mini cucumbers  1/4 cup nuts (any kind)  1 Tablespoon peanut butter with 1 stalk celery   1 dill pickle wrapped in 1-2 slices of deli ham with 1 tsp of mayonnaise/mustard.      MEDICATIONS FOR WEIGHT LOSS  There are several medications available to assist us in weight loss.  By themselves, without a mindful change in diet and increase in movement/activity these medications are disappointing in their results. However, combined with a closely monitored program of diet change and exercise they can be very effective in controlling appetite and boosting initial weight loss.  All weight loss medications need continual re-evaluation for efficacy as their side effects and health benefits fail to be worthwhile if a person is not continuing to lose weight or in maintaining their healthy weight.  Some weight loss medications are scheduled drugs, meaning there is at least a theoretical possibility for developing addiction to them, but in practice this is rare.  We do anticipate coming off meds in the future- after stabilization of weight loss is assurred.  Finally, a tolerance can develop and people s perceived efficacy of medication can  diminish.  In communication with your physician, it may be appropriate to intermittently take a break from these medications and then restart again (few weeks off then restart again) if a plateau is reached that cannot be broken through.  Each person can respond to a medication differently and to be a good option for you, it will need to be affordable, effective and well tolerated with minimal side effects.    In most cases, weight loss progress after one month and three months will be obtained and if a patient is not reaching the satisfactory progress towards weight loss, the medications may be discontinued.  The thought is that if a person is taking a weight loss medication and not receiving the potential health benefit of that drug, the side effects are not worthwhile and use should be discontinued.  On the flip side, there are many people on some weight loss medications for years because it continues to be an effective tool in their weight management and they are tolerating the medication without any long-term side effects.  Each person's response and purpose will be evaluated.      PHENTERMINE (Adipex): approved in 1959 for appetite suppression.  It has stimulant effects and cannot be used with Ritalin, Concerta, or other stimulants.  Although it is not highly addictive, it's chemically related to amphetamines which are addictive and is classified as a Controlled Substance by the [employer].  Occasional dependence can develop, but rarely. The most common side effects are dry mouth, increased energy and concentration, increased pulse, and constipation.  You should not take phentermine if you have glaucoma, hyperthyroidism, or uncontrolled/untreated hypertension or overly anxious. You should stop if dramatic mood swings, severe insomnia, palpations, chest pains, visual changes or if your Blood Pressure is consistently elevated or any time it's over 160/90.   It's ok to go off the med for a few weeks and restart if  efficacy is wearing off.  $24-$30 for 90 tablets at [facility]. Females are required to have reliable birth control to reduce the risk birth defects/miscarriage.      TOPIRAMATE (Topamax): Anti-seizure medication, also used to prevent migraines and sometimes for mood stabilization.  Side effects include paresthesia, glaucoma, altered concentration, attention difficulties, memory and speech problems, metabolic acidosis, depression, increase in body temperature and decrease sweating, risk of kidney stones.  Do not take Topamax while taking Depakote as this can cause high ammonia levels.  You must have reliable birth control as Topamax can cause birth defects.  If prolonged use has occurred it should be tapered off slowly to avoid withdrawal issues.  Insurance usually covers Topiramate.  At higher doses, there may be some confusion/forgetfulness associated with this so we try to limit dose to under 75mg twice daily to reduce this risk. Often covered by insurance as it's used for many reasons.  Topamax will cause carbonated beverages to taste bad. A recheck of your kidney/electrolytes may occur within a few months of starting.    QSYMIA (Phentermine + Topamax extended release):  See above information about phentermine and Topamax.  Most common side effects are paresthesia, dizziness, distortion of taste, insomnia, constipation, and dry mouth.  See above descriptions for the two individual agents.Females are required to have reliable birth control to reduce the risk birth defects/miscarriage.  $100-200 per month but coupons/programs exist at Qsymia.com that may reduce costs depending on a patient's coverage. Has  a low, medium and higher dosing option and usually titrating upwards is expected for continued good benefit and consideration for tapering downward or using lower dose in stabilization phases.      GLP1 Agonists:  Liraglutide (Victoza/Saxenda), Semaglutide (Ozempic/Wegovy):   Part of the family of Glucagon  Like Peptide Agonists, these medications directly suppresses appetite and are often used by diabetic patients due to improvements in glucose/insulin balance.  They also slow how quickly the stomach empties, increasing fullness. They may be hard to get covered for non diabetics and some plans have exclusions for weight loss purposes.  Currently, these are  injectable medications delivered via autoinjector pen. It can be very costly without insurance coverage (over $500/month).  Small risks for pancreatitis exists and dose should be held if increased mid abdominal pain/burning. It is not to be used if previous Multiple Endocrine Neoplasia. In rodents, may increase risk of thyroid tumors and not indicated for anyone with a history of medullary thyroid cancer as a result.  If changes in voice/swallowing should be discontinued. Reliable birth control required in women. [email or web address], Wegovy.[email or web address] has more information on these medications.    Zepbound (Tirzepatide):  Similar in many ways to Wegovy/Saxenda type products, works by boosting satiety signals that improve sense of fullness/satiety, boosting both GIP and GLP1 hormones. Not to be used by those with Multiple endocrine neoplasia, medullary thyroid cancer and not likely tolerated well for those with recurrent/idiopathic pancreatitis issues. Costly without insurance coverage but very effective in curbing appetite. Once weekly injectable therapy. Nausea/upset stomach/constipation or diarrhea are common side effects. Heart burn can increase in some, as it slows stomach emptying speeds. Should be halted a few weeks prior to elective procedures and may require re-ramping afterwards. [employer] has good patient resources/information.    Contrave (Bupropion/Naltrexone).    Synergistic combination of a mild appetite suppressing anti-depressant (Bupropion) whose effects are increased due to interaction with Naltrexone.  Naltrexone may have some effects on craving and is often  "used in addiction medicine to help previous opiate addicts be less prone to relapse as it blocks the action of opiates. Should be stopped if any need for opiate pain medication, surgery or planned procedures where you'll be given sedation/anesthesia. If prolonged use, recommend stepping down bupropion over 2-3 weeks to limit any risk of withdrawal issues. Side effects may include dry mouth, increased heart rate, mild elevation in Blood pressure;  dizziness, ringing in the ears, anxiety (typically due to bupropion), nausea, constipation, and some get fatigued with naltrexone.  About $210 on [facility] for 120 tabs of \"Contrave\", the brand name without insurance coverage. Generic Bupropion 75mg: $25 for 120 tabs, Naltrexone: $55 for 90 tabs without insurance coverage on [employer]. Cannot be used if pregnant/trying to conceive or breast feeding.  Plenity:   NO LONGER AVAILABLE due to company going bankrupt. [email or web address] has more information.      Wegovy (semaglutide) is a very effective satiety boosting appetite suppressant. It needs to be ramped up slowly to be tolerated adequately.  About 1/10 people will not tolerate this medication. Each month, you move up to a higher dose until eventually reaching the 2.4mg/week dose if tolerated. When in plentiful supply, one try at re-ramping is recommended if the initial ramping isn't tolerated well and if that re-ramping isn't tolerated well, it's best to avoid this medication.       Wegovy starts at 0.25mg/week for 4 weeks then increases to 0.5mg/week for 4 weeks then 1mg/week for 4 weeks then 1.7mg/week for 4 weeksthen 2.4mg/week usually for 6-9 months if tolerated well.  Injections can be given after cleansing the skin with alcohol prep pad or swab (available OTC).     Stop Wegovy if severe abdominal pain/vomiting/rash/throat swelling or constant nausea that prevents adequate food/water intake. Stop 2-3 weeks prior to any planned general anesthesia surgeries to reduce risk " for something called a post operative ileus.     Gallstones can occur in about 1% of patients on this medication so update me if increase right upper abdominal pain after eating.     Start meals with protein first, separate beverages from meals by 20 minutes and work hard in between meals to get your 64-75 oz of water daily to reduce risks for severe constipation. Consider a fiber supplement like powdered psyllium husk in 12 oz water each night, stool softerners as needed and Miralax or milk of Magnesia if more than 3 days have passed without a Bowel Movement.     Check out De[employer]Near[employer]me for patient resources.  If you have weekends off, I recommend dosing Friday evenings.     Some people starve on this medication if not mindful about food intake. I recommend starting meals with the protein part of your meal first, chew thoroughly and separate beverages from meals by about 20 minutes to make sure you get your nourishment in first. Include vegetables/complex carbohydrates and unsaturated fat as part of your balanced diet but group these at the end of the meal, after protein is mostly gone. Satiety will kick in too early if drinking too much with meals and under nourishment can result.     It's not a bad idea to take a complete multivitamin most days of the week if using this medication.     Pancreatitis is a very rare but potentially serious side effect. Stop Wegovy if severe mid abdominal pain/burning in nature or if unable to eat/drink due to severe nausea/discomfort.   People with strong history of pancreatitis without clear cause should stay clear of this medication as should those with strong personal or family history for medullary thyroid cancer or Multiple Endocrine Neoplasia (rare).     Kind Regards,  Darron Andujar MD  [facility]      Exercise Guidance    Nearly everything that bothers us gets better when the proper amount of exercise can be done in the proper amounts.   Getting to that level safely and without injury is the key.  When it comes to weight loss, exercise is especially important in maintaining the weight loss.  Unfortunately, one of the harsh realities is that substantial weight loss slows our metabolism, often anywhere from 5-20%.    Our brain always remembers our heaviest weight and we can return to that if we're not mindful and moving regularly.  Our biology doesn't understand the concept of having too much energy, only not having enough.  As such, when we lose weight, it's thought that the brain interprets this as we're ill or in a famine and dials back our metabolism to limit further weight loss.  This is why exercise is so important in keeping the weight off and is the main reason people have some weight regain from their low weight point after weight loss.  We have to make up that 10-20% of calories not being burned.Since we can restrict our intake for only so long, exercise becomes very important in our long term healthy weigh maintenance to balance out the occasional indiscretion with our diet.    Generally, for every 5% body weight reduction in a weight loss season, a person needs to add  kilocalories of exercise in their daily routine to keep that weight off for the long term.  This is why it's vital to be starting your fitness regimen during weight loss season, so that routine is well established as you move into your maintenance period.    Additionally, all sorts of good enzymes and genes turn on with exercise and our stress, sleep, mood and bodies feel better when we can get to the point of making ourselves a little sweaty and short of breath 35-50 minutes most days of the week. But we have to start with what we can do first and give ourselves permission to work our way up to this goal.    Who isn't ready for exercise? Well, if you get severe dizziness/palpitations, chest pain or short of breath/faint with even minimal activity like walking across a  "room or you're having to pause while going up a flight of stairs, then getting your heart and/or lungs fully evaluated prior to starting an exercise regimen is recommended. Everyone else can probably start a program, but everyone may start at a different point:  Some can set a 5-10 minute walking goal and others will be able to ride their bike for an hour.      Start with where you're at and look to add 10% more each week until you're at that 150 minutes or more a week (or 75 minutes/week or more of vigorous exercise). Moderate exercise can be estimated as the pace you can carry on a conversation and vigorous is the pace at which you can get 3-5 words out before having to take a breath.  If you're using heart rate monitoring, Moderate is about 60% of your maximum heart rate and vigorous about 75%. (Max heart rate estimated as 220 beats minus your age:  Example: 220-age of 44 =176 Beats per minute (BPM) maximum. 0.6X 176= 105 BPM (moderate), 132 BMP(vigorous)).    If you like to count steps, the 10,000 steps per day does correlate well with weight maintenance but try to make at least 20-25% of those steps at a brisk pace (like you are about to miss your bus).    Finally, if you are pressed for time, it's important to know that some exercise is better than none.  High Intensity Interval training (HIIT) is a good way to get as much out of a short period of working out. If you can't walk, use the stairs, bike or swim; you could use a punching/arm workout regimen for your activity.  The idea with HIIT is to have a 3-6 minute warm up period of low intensity and the 3-6 \"intervals\" where you push the intensity up and then recover and start the next interval. One study showed that 3 intervals of 20 seconds at \"Maximum Effort\" while either biking on a stationary bike or going up stairs and then having 100 seconds recovery time before the next Maximum Effort was equally as beneficial on cardiovascular fitness development as " doing 30 minutes of moderately paced walking 3 days weekly over a 6 week period of time.  So intensity matters. You just need to be able to safely do your desired exercise without injury. There are many great HIIT exercises/routines out there. IF you're not doing much exercise currently, I recommend giving your self 2-3 weeks of moderate exercise, 3 days weekly minimum to get your bones/tendons/muscles used to exercise before going for High Intensity workouts.    If you like to use Apps on the phone, the couch to 5k hemanth and 7 minute workout apps are nice places to start if you are reasonably healthy.  There are hundreds of other options out there.  Consider viewing [employer] if gentler exercise/movement is desired. Videos on K[employer] and chair yoga for seniors exist and are free. Check them out and let's get that 3-4 days a week routine going.    Let's move!  Darron Andujar MD.

## 2024-02-29 NOTE — PROGRESS NOTES
Nicci Pemberton is a 63 year old who is being evaluated via a billable telephonevisit.        Medical Weight Loss Initial Diet Evaluation  Assessment:  This patient was referred by Dr. Andujar for MNT as treatment for Morbid obesity which is impacting her cholesterol levels, fatty liver, and pain.  Nicci is presenting today for a new weight management nutrition consultation. Pt has had an initial appointment with Dr. Andujar.    Weight loss medication: Semaglutide.     Personal Goals: weight loss and health focused      Anthropometrics:    Pt's weight is 229 lbs  Initial weight: 229 lbs  Weight change: n/a  BMI: There is no height or weight on file to calculate BMI.   Ideal body weight: 50.1 kg (110 lb 7.2 oz)  Adjusted ideal body weight: 72 kg (158 lb 10.7 oz)  Estimated RMR (Robeson-St Jeor equation):  1550 kcals x 1.2 (sedentary) = 1860 kcals (for weight maintenance)    Recommended Protein Intake: 60-80 grams of protein/day    Medical History:  Patient Active Problem List   Diagnosis    Heterozygous alpha 1-antitrypsin deficiency (H)    Iron overload    Status post liver transplantation (H)    C. difficile diarrhea    Steroid-induced hyperglycemia    Immunosuppressed status (H24)    History of liver recipient (H)    Vertigo    Otitis media    Bile duct stricture (H28)    Common bile duct stenosis (H28)    Abnormal liver function    Acquired lymphedema of leg    Chronic pain of both knees    Venous hypertension of lower extremity, bilateral    Cramp in lower extremity associated with sleep    Obesity with serious comorbidity    Edema    Hypertension, unspecified type    Gastroesophageal reflux disease without esophagitis    Hyponatremia    Hypothyroidism    Leg swelling    Lymphedema    Macrocytosis without anemia    Malaise    Fatty liver disease, nonalcoholic    Osteoarthritis of both knees, unspecified osteoarthritis type    Osteoarthritis of knee    Peripheral neuropathy    Post-menopausal bleeding    Slow  transit constipation    Thrombocytopenia (H24)    Vitamin D deficiency    Zinc deficiency    Bilateral edema of lower extremity    Physical debility    Diverticulitis of colon    Suspected 2019 novel coronavirus infection    Morbid obesity (H)    S/P liver transplant (H)    Liver transplant rejection (H)    Chronic kidney disease, stage 3 (H)    Left knee pain    Status post total knee replacement    Incisional infection    Fracture of bone of left shoulder    Other hyperlipidemia        Nutrition History:   Food allergies/intolerances/cultural or religous food customs: Yes apples and fruits with core and pits  Weight loss history: currently tracking her meals via hemanth with her .  has recently been dx with pre diabetes. Lost weight when starting to track intake, limit refine carbs.       Dietary Recall:  Breakfast: Eggs, keto cereal, fruit, yogurt, juice, coffee   Lunch: Lunch meat, cheese sticks, puffcorn snacks, meat sticks, cream cheese   Dinner: Seafood, poultry, pork, beef, casserole, salad, vegetables   Typical Snacks: did not provide     Eating out: did not discuss     Beverages: did not list    Exercise: n/a    Nutrition Diagnosis (PES statement):   Morbid obesity related to excessive energy intake as evidence by patient subjective report and BMI of 41.87       Nutrition Intervention  Food and/or Nutrient Delivery   Discussed healthy fats and complex carbohydrates.  Placed emphasis on importance of developing a healthy meal routine, aiming for 3 meals a day and no snacks.  Discussed using a protein supplement as a meal replacement.  Nutrition Education   Discussed with patient how to build a meal: the importance of including a lean/low fat protein at each meal, include a source of vegetables at a minimum of lunch and dinner and limiting carbohydrate intake to 25% per meal.  Educated on sources of lean protein, portion sizes, the amount of grams found in each source. Recommend patient to aim for  20-30g protein at each meal.  Educated on how to read a food label: keeping total fat <10g and sugar <10g per serving.  Discussed the importance of adequate hydration, with emphasis on drinking 64oz of water or zero calorie beverages per day.  Nutrition Counseling   Encouraged importance of developing routine exercise for health benefits and weight loss.    Goals established by patient:   Choose low fat and fat free dairy products  Increase protein intake aiming for ~20g protein per meal   Limit calorie containing beverages     Handouts provided:  Intro to MWM  Protein  Fats  Carb     Assessment/Plan:    Pt will follow up in 2.5 month(s) with dietitian.       Phone call duration: 26 minutes      Originating Location (pt. Location): Home      Distant Location (provider location):  Off-site    Mode of Communication:  Video Conference via [employer]    Physician has received verbal consent for a Video Visit from the patient? Yes      Anamaria Vargas RD

## 2024-03-01 ENCOUNTER — TELEPHONE (OUTPATIENT)
Dept: GASTROENTEROLOGY | Facility: CLINIC | Age: 64
End: 2024-03-01
Payer: COMMERCIAL

## 2024-03-08 ENCOUNTER — TELEPHONE (OUTPATIENT)
Dept: TRANSPLANT | Facility: CLINIC | Age: 64
End: 2024-03-08
Payer: COMMERCIAL

## 2024-03-08 ENCOUNTER — TELEPHONE (OUTPATIENT)
Dept: SURGERY | Facility: CLINIC | Age: 64
End: 2024-03-08
Payer: COMMERCIAL

## 2024-03-08 DIAGNOSIS — Z13.220 LIPID SCREENING: Primary | ICD-10-CM

## 2024-03-08 RX ORDER — ROSUVASTATIN CALCIUM 5 MG/1
5 TABLET, COATED ORAL DAILY
Qty: 30 TABLET | Refills: 3 | Status: SHIPPED | OUTPATIENT
Start: 2024-03-08 | End: 2024-07-03

## 2024-03-08 NOTE — TELEPHONE ENCOUNTER
Called pt back and got her scheduled for July 8th in person. I also put her on the wait list.     Vee Hay RN, CBN  [facility]  P [phone]  F [phone]

## 2024-03-08 NOTE — TELEPHONE ENCOUNTER
Pt ws started on zocor but it interacts with CSA. Reviewed with Dr LEventhal and Nawaf, pharmacist. Pt changed to Rosuvastatin 5 mg daily and will follow up with PCP.

## 2024-03-08 NOTE — TELEPHONE ENCOUNTER
Reason for Call:  Other appointment    Detailed comments: Patient would like to know if Dr Andujar would like to see her in person for her next visit to check her weight.    Phone Number Patient can be reached at: Cell number on file:    Telephone Information:   Mobile [phone]       Best Time: any    Can we leave a detailed message on this number? YES    Call taken on 3/8/2024 at 2:06 PM by Shari Lowe

## 2024-03-12 ENCOUNTER — LAB (OUTPATIENT)
Dept: LAB | Facility: CLINIC | Age: 64
End: 2024-03-12
Payer: COMMERCIAL

## 2024-03-12 DIAGNOSIS — E66.01 CLASS 3 SEVERE OBESITY DUE TO EXCESS CALORIES WITH SERIOUS COMORBIDITY AND BODY MASS INDEX (BMI) OF 40.0 TO 44.9 IN ADULT (H): ICD-10-CM

## 2024-03-12 DIAGNOSIS — T86.41 LIVER TRANSPLANT REJECTION (H): ICD-10-CM

## 2024-03-12 DIAGNOSIS — E66.813 CLASS 3 SEVERE OBESITY DUE TO EXCESS CALORIES WITH SERIOUS COMORBIDITY AND BODY MASS INDEX (BMI) OF 40.0 TO 44.9 IN ADULT (H): ICD-10-CM

## 2024-03-12 DIAGNOSIS — Z94.4 LIVER REPLACED BY TRANSPLANT (H): ICD-10-CM

## 2024-03-12 DIAGNOSIS — Z13.220 LIPID SCREENING: ICD-10-CM

## 2024-03-12 LAB
ALBUMIN SERPL BCG-MCNC: 4.1 G/DL (ref 3.5–5.2)
ALP SERPL-CCNC: 93 U/L (ref 40–150)
ALT SERPL W P-5'-P-CCNC: 19 U/L (ref 0–50)
ANION GAP SERPL CALCULATED.3IONS-SCNC: 6 MMOL/L (ref 7–15)
AST SERPL W P-5'-P-CCNC: 34 U/L (ref 0–45)
BILIRUB DIRECT SERPL-MCNC: 0.39 MG/DL (ref 0–0.3)
BILIRUB SERPL-MCNC: 2 MG/DL
BUN SERPL-MCNC: 25.1 MG/DL (ref 8–23)
CALCIUM SERPL-MCNC: 10.1 MG/DL (ref 8.8–10.2)
CHLORIDE SERPL-SCNC: 104 MMOL/L (ref 98–107)
CK SERPL-CCNC: 140 U/L (ref 26–192)
CREAT SERPL-MCNC: 1 MG/DL (ref 0.51–0.95)
DEPRECATED HCO3 PLAS-SCNC: 30 MMOL/L (ref 22–29)
EGFRCR SERPLBLD CKD-EPI 2021: 63 ML/MIN/1.73M2
GLUCOSE SERPL-MCNC: 97 MG/DL (ref 70–99)
POTASSIUM SERPL-SCNC: 5.2 MMOL/L (ref 3.4–5.3)
PROT SERPL-MCNC: 7.2 G/DL (ref 6.4–8.3)
SODIUM SERPL-SCNC: 140 MMOL/L (ref 135–145)

## 2024-03-12 PROCEDURE — 82607 VITAMIN B-12: CPT

## 2024-03-12 PROCEDURE — 82248 BILIRUBIN DIRECT: CPT

## 2024-03-12 PROCEDURE — 99000 SPECIMEN HANDLING OFFICE-LAB: CPT

## 2024-03-12 PROCEDURE — 36415 COLL VENOUS BLD VENIPUNCTURE: CPT

## 2024-03-12 PROCEDURE — 82550 ASSAY OF CK (CPK): CPT

## 2024-03-12 PROCEDURE — 80053 COMPREHEN METABOLIC PANEL: CPT

## 2024-03-12 PROCEDURE — 84630 ASSAY OF ZINC: CPT | Mod: 90

## 2024-03-12 PROCEDURE — 82306 VITAMIN D 25 HYDROXY: CPT

## 2024-03-13 LAB
VIT B12 SERPL-MCNC: 897 PG/ML (ref 232–1245)
VIT D+METAB SERPL-MCNC: 57 NG/ML (ref 20–50)

## 2024-03-14 ENCOUNTER — TELEPHONE (OUTPATIENT)
Dept: TRANSPLANT | Facility: CLINIC | Age: 64
End: 2024-03-14
Payer: COMMERCIAL

## 2024-03-14 LAB — ZINC SERPL-MCNC: 60.5 UG/DL

## 2024-03-14 NOTE — TELEPHONE ENCOUNTER
Confirmed with pt that she is taking vitamin d 1000iu BID. Advised pt to reduce to daily. Pt will.

## 2024-03-19 ENCOUNTER — LAB (OUTPATIENT)
Dept: LAB | Facility: CLINIC | Age: 64
End: 2024-03-19
Payer: COMMERCIAL

## 2024-03-19 ENCOUNTER — HOSPITAL ENCOUNTER (OUTPATIENT)
Dept: BONE DENSITY | Facility: CLINIC | Age: 64
Discharge: HOME | End: 2024-03-19
Attending: INTERNAL MEDICINE | Admitting: INTERNAL MEDICINE
Payer: COMMERCIAL

## 2024-03-19 DIAGNOSIS — Z94.4 LIVER REPLACED BY TRANSPLANT (H): ICD-10-CM

## 2024-03-19 DIAGNOSIS — T86.41 LIVER TRANSPLANT REJECTION (H): ICD-10-CM

## 2024-03-19 DIAGNOSIS — Z13.220 LIPID SCREENING: ICD-10-CM

## 2024-03-19 LAB
ALBUMIN SERPL BCG-MCNC: 4.1 G/DL (ref 3.5–5.2)
ALP SERPL-CCNC: 88 U/L (ref 40–150)
ALT SERPL W P-5'-P-CCNC: 18 U/L (ref 0–50)
ANION GAP SERPL CALCULATED.3IONS-SCNC: 9 MMOL/L (ref 7–15)
AST SERPL W P-5'-P-CCNC: 29 U/L (ref 0–45)
BILIRUB DIRECT SERPL-MCNC: 0.4 MG/DL (ref 0–0.3)
BILIRUB SERPL-MCNC: 1.8 MG/DL
BUN SERPL-MCNC: 26.3 MG/DL (ref 8–23)
CALCIUM SERPL-MCNC: 9.9 MG/DL (ref 8.8–10.2)
CHLORIDE SERPL-SCNC: 105 MMOL/L (ref 98–107)
CREAT SERPL-MCNC: 1.15 MG/DL (ref 0.51–0.95)
DEPRECATED HCO3 PLAS-SCNC: 28 MMOL/L (ref 22–29)
EGFRCR SERPLBLD CKD-EPI 2021: 53 ML/MIN/1.73M2
GLUCOSE SERPL-MCNC: 84 MG/DL (ref 70–99)
POTASSIUM SERPL-SCNC: 4.6 MMOL/L (ref 3.4–5.3)
PROT SERPL-MCNC: 6.8 G/DL (ref 6.4–8.3)
SODIUM SERPL-SCNC: 142 MMOL/L (ref 135–145)

## 2024-03-19 PROCEDURE — 82248 BILIRUBIN DIRECT: CPT

## 2024-03-19 PROCEDURE — 80053 COMPREHEN METABOLIC PANEL: CPT

## 2024-03-19 PROCEDURE — 77080 DXA BONE DENSITY AXIAL: CPT

## 2024-03-19 PROCEDURE — 36415 COLL VENOUS BLD VENIPUNCTURE: CPT

## 2024-03-20 ENCOUNTER — OFFICE VISIT (OUTPATIENT)
Dept: DERMATOLOGY | Facility: CLINIC | Age: 64
End: 2024-03-20
Payer: COMMERCIAL

## 2024-03-20 DIAGNOSIS — D49.2 NEOPLASM OF SKIN: Primary | ICD-10-CM

## 2024-03-20 PROCEDURE — 99212 OFFICE O/P EST SF 10 MIN: CPT | Performed by: DERMATOLOGY

## 2024-03-20 ASSESSMENT — PAIN SCALES - GENERAL: PAINLEVEL: NO PAIN (0)

## 2024-03-20 NOTE — NURSING NOTE
Dermatology Rooming Note    Nicci Pemberton's goals for this visit include:   Chief Complaint   Patient presents with    RECHECK     6 month recheck:  - R lower lip, suspect labial melanotic macule  - L shoulder, suspect lentigo         Tiana Edwards LPN

## 2024-03-20 NOTE — PROGRESS NOTES
[facility] Dermatology Note  Encounter Date: Mar 20, 2024  Office Visit     Dermatology Problem List:  1. History of liver transplant 8/2019 2/2 A1AT.  2. Intradermal melanocytic nevus, central lower back, s/p 09/12/23  3. Benign bx:  - Nevus lipomatosus superficialis, right medial buttock, s/p bx 09/12/23  - Lentiginous junctional melanocytic nevus, right knee, s/p bx 9/6/2022  - Lentiginous junctional melanocytic nevus, left upper chest, s/p bx 9/6/2022    ____________________________________________    Assessment & Plan:    # Lesions to monitor:  - R lower lip, suspect labial melanotic macule. Overall unchanged from prior. Depending on how photo is taken, can look more linear.  - L shoulder, suspect lentigo. Overall unchanged from prior.  - Photos today, recheck in 6 months at [facility]    Procedures Performed:   None.    Follow-up: 6 month(s) in-person, or earlier for new or changing lesions    Staff and Scribe:     Scribe Disclosure:   I, VICKY MYERS, am serving as a scribe; to document services personally performed by Marilynn Thomas MD -based on data collection and the provider's statements to me.    Provider Disclosure:   The documentation recorded by the scribe accurately reflects the services I personally performed and the decisions made by me.    Marilynn Thomas MD    Department of Dermatology  [facility]: Phone: [phone], Fax: [phone]  3/24/2024     ____________________________________________    CC: RECHECK (6 month recheck:/- R lower lip, suspect labial melanotic macule/- L shoulder, suspect lentigo//)    HPI:  Ms. Nicci Pemberton is a(n) 63 year old female who presents today as a return patient for recheck.    The patient presents to recheck her R lower lip and left shoulder.    Patient is otherwise feeling well, without additional skin concerns.    Labs  Reviewed:  N/A    Physical Exam:  Vitals: There were no vitals taken for this visit.  SKIN: Focused examination of the following was performed.  - 4 mm even light brown macule right lower lip. Overall unchanged from prior. Depending on how photo is taken, can look more linear.  - 4x4 mm somewhat stellate shaped brown macule on left upper back.   - Well healed biopsy site on left upper chest. Resolved.  - No other lesions of concern on areas examined.     Medications:  Current Outpatient Medications   Medication    acetaminophen (TYLENOL) 325 MG tablet    Cholecalciferol (VITAMIN D-3) 25 MCG (1000 UT) CAPS    COMPRESSION STOCKINGS    cycloSPORINE modified (GENERIC EQUIVALENT) 25 MG capsule    famotidine (PEPCID) 20 MG tablet    levothyroxine (SYNTHROID/LEVOTHROID) 125 MCG tablet    mycophenolate (GENERIC EQUIVALENT) 250 MG capsule    rosuvastatin (CRESTOR) 5 MG tablet    Semaglutide-Weight Management (WEGOVY) 0.25 MG/0.5ML pen    [START ON 4/5/2024] Semaglutide-Weight Management (WEGOVY) 0.5 MG/0.5ML pen    [START ON 5/3/2024] Semaglutide-Weight Management (WEGOVY) 1 MG/0.5ML pen     No current facility-administered medications for this visit.      Past Medical History:   Patient Active Problem List   Diagnosis    Heterozygous alpha 1-antitrypsin deficiency (H)    Iron overload    Status post liver transplantation (H)    C. difficile diarrhea    Steroid-induced hyperglycemia    Immunosuppressed status (H24)    History of liver recipient (H)    Vertigo    Otitis media    Bile duct stricture (H28)    Common bile duct stenosis (H28)    Abnormal liver function    Acquired lymphedema of leg    Chronic pain of both knees    Venous hypertension of lower extremity, bilateral    Cramp in lower extremity associated with sleep    Obesity with serious comorbidity    Edema    Hypertension, unspecified type    Gastroesophageal reflux disease without esophagitis    Hyponatremia    Hypothyroidism    Leg swelling    Lymphedema     Macrocytosis without anemia    Malaise    Fatty liver disease, nonalcoholic    Osteoarthritis of both knees, unspecified osteoarthritis type    Osteoarthritis of knee    Peripheral neuropathy    Post-menopausal bleeding    Slow transit constipation    Thrombocytopenia (H24)    Vitamin D deficiency    Zinc deficiency    Bilateral edema of lower extremity    Physical debility    Diverticulitis of colon    Suspected 2019 novel coronavirus infection    Morbid obesity (H)    S/P liver transplant (H)    Liver transplant rejection (H)    Chronic kidney disease, stage 3 (H)    Left knee pain    Status post total knee replacement    Incisional infection    Fracture of bone of left shoulder    Other hyperlipidemia     Past Medical History:   Diagnosis Date    Anemia     Cellulitis     Cirrhosis of liver (H) 2018    cirrhosis secondary to combination of nonalcoholic fatty liver disease, iron overload, and alpha-1 antitrypsin MZ phenotype who is s/p  donor liver transplant on 19    Gastroesophageal reflux disease     Heterozygous alpha 1-antitrypsin deficiency (H)     High hepatic iron concentration determined by biopsy of liver     Incisional infection 2021    Left TKA incision    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 on close of this encounter.

## 2024-03-20 NOTE — LETTER
3/20/2024       RE: Nicci Pemberton  [address] [place] [address]     Dear Colleague,    Thank you for referring your patient, Nicci Pemberton, to the [facility] [place] [facility]. Please see a copy of my visit note below.    [facility] Dermatology Note  Encounter Date: Mar 20, 2024  Office Visit     Dermatology Problem List:  1. History of liver transplant 8/2019 2/2 A1AT.  2. Intradermal melanocytic nevus, central lower back, s/p 09/12/23  3. Benign bx:  - Nevus lipomatosus superficialis, right medial buttock, s/p bx 09/12/23  - Lentiginous junctional melanocytic nevus, right knee, s/p bx 9/6/2022  - Lentiginous junctional melanocytic nevus, left upper chest, s/p bx 9/6/2022    ____________________________________________    Assessment & Plan:    # Lesions to monitor:  - R lower lip, suspect labial melanotic macule. Overall unchanged from prior. Depending on how photo is taken, can look more linear.  - L shoulder, suspect lentigo. Overall unchanged from prior.  - Photos today, recheck in 6 months at [facility]    Procedures Performed:   None.    Follow-up: 6 month(s) in-person, or earlier for new or changing lesions    Staff and Scribe:     Scribe Disclosure:   I, VICKY MYERS, am serving as a scribe; to document services personally performed by Marilynn Thomas MD -based on data collection and the provider's statements to me.    Provider Disclosure:   The documentation recorded by the scribe accurately reflects the services I personally performed and the decisions made by me.    Marilynn Thomas MD    Department of Dermatology  [facility]: Phone: [phone], Fax: [phone]  3/24/2024     ____________________________________________    CC: RECHECK (6 month recheck:/- R lower lip, suspect labial melanotic  macule/- L shoulder, suspect lentigo//)    HPI:  Ms. Nicci Pemberton is a(n) 63 year old female who presents today as a return patient for recheck.    The patient presents to recheck her R lower lip and left shoulder.    Patient is otherwise feeling well, without additional skin concerns.    Labs Reviewed:  N/A    Physical Exam:  Vitals: There were no vitals taken for this visit.  SKIN: Focused examination of the following was performed.  - 4 mm even light brown macule right lower lip. Overall unchanged from prior. Depending on how photo is taken, can look more linear.  - 4x4 mm somewhat stellate shaped brown macule on left upper back.   - Well healed biopsy site on left upper chest. Resolved.  - No other lesions of concern on areas examined.     Medications:  Current Outpatient Medications   Medication    acetaminophen (TYLENOL) 325 MG tablet    Cholecalciferol (VITAMIN D-3) 25 MCG (1000 UT) CAPS    COMPRESSION STOCKINGS    cycloSPORINE modified (GENERIC EQUIVALENT) 25 MG capsule    famotidine (PEPCID) 20 MG tablet    levothyroxine (SYNTHROID/LEVOTHROID) 125 MCG tablet    mycophenolate (GENERIC EQUIVALENT) 250 MG capsule    rosuvastatin (CRESTOR) 5 MG tablet    Semaglutide-Weight Management (WEGOVY) 0.25 MG/0.5ML pen    [START ON 4/5/2024] Semaglutide-Weight Management (WEGOVY) 0.5 MG/0.5ML pen    [START ON 5/3/2024] Semaglutide-Weight Management (WEGOVY) 1 MG/0.5ML pen     No current facility-administered medications for this visit.      Past Medical History:   Patient Active Problem List   Diagnosis    Heterozygous alpha 1-antitrypsin deficiency (H)    Iron overload    Status post liver transplantation (H)    C. difficile diarrhea    Steroid-induced hyperglycemia    Immunosuppressed status (H24)    History of liver recipient (H)    Vertigo    Otitis media    Bile duct stricture (H28)    Common bile duct stenosis (H28)    Abnormal liver function    Acquired lymphedema of leg    Chronic pain of both knees    Venous  hypertension of lower extremity, bilateral    Cramp in lower extremity associated with sleep    Obesity with serious comorbidity    Edema    Hypertension, unspecified type    Gastroesophageal reflux disease without esophagitis    Hyponatremia    Hypothyroidism    Leg swelling    Lymphedema    Macrocytosis without anemia    Malaise    Fatty liver disease, nonalcoholic    Osteoarthritis of both knees, unspecified osteoarthritis type    Osteoarthritis of knee    Peripheral neuropathy    Post-menopausal bleeding    Slow transit constipation    Thrombocytopenia (H24)    Vitamin D deficiency    Zinc deficiency    Bilateral edema of lower extremity    Physical debility    Diverticulitis of colon    Suspected  novel coronavirus infection    Morbid obesity (H)    S/P liver transplant (H)    Liver transplant rejection (H)    Chronic kidney disease, stage 3 (H)    Left knee pain    Status post total knee replacement    Incisional infection    Fracture of bone of left shoulder    Other hyperlipidemia     Past Medical History:   Diagnosis Date    Anemia     Cellulitis     Cirrhosis of liver (H) 2018    cirrhosis secondary to combination of nonalcoholic fatty liver disease, iron overload, and alpha-1 antitrypsin MZ phenotype who is s/p  donor liver transplant on 19    Gastroesophageal reflux disease     Heterozygous alpha 1-antitrypsin deficiency (H)     High hepatic iron concentration determined by biopsy of liver     Incisional infection 2021    Left TKA incision    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 on close of this encounter.

## 2024-03-27 ENCOUNTER — LAB (OUTPATIENT)
Dept: LAB | Facility: CLINIC | Age: 64
End: 2024-03-27
Payer: COMMERCIAL

## 2024-03-27 DIAGNOSIS — T86.41 LIVER TRANSPLANT REJECTION (H): ICD-10-CM

## 2024-03-27 DIAGNOSIS — Z13.220 LIPID SCREENING: ICD-10-CM

## 2024-03-27 DIAGNOSIS — Z94.4 LIVER REPLACED BY TRANSPLANT (H): ICD-10-CM

## 2024-03-27 LAB
ALBUMIN SERPL BCG-MCNC: 4.1 G/DL (ref 3.5–5.2)
ALP SERPL-CCNC: 93 U/L (ref 40–150)
ALT SERPL W P-5'-P-CCNC: 18 U/L (ref 0–50)
ANION GAP SERPL CALCULATED.3IONS-SCNC: 6 MMOL/L (ref 7–15)
AST SERPL W P-5'-P-CCNC: 35 U/L (ref 0–45)
BILIRUB DIRECT SERPL-MCNC: 0.44 MG/DL (ref 0–0.3)
BILIRUB SERPL-MCNC: 2.2 MG/DL
BUN SERPL-MCNC: 22 MG/DL (ref 8–23)
CALCIUM SERPL-MCNC: 9.4 MG/DL (ref 8.8–10.2)
CHLORIDE SERPL-SCNC: 105 MMOL/L (ref 98–107)
CREAT SERPL-MCNC: 1.18 MG/DL (ref 0.51–0.95)
DEPRECATED HCO3 PLAS-SCNC: 28 MMOL/L (ref 22–29)
EGFRCR SERPLBLD CKD-EPI 2021: 52 ML/MIN/1.73M2
GLUCOSE SERPL-MCNC: 117 MG/DL (ref 70–99)
POTASSIUM SERPL-SCNC: 5 MMOL/L (ref 3.4–5.3)
PROT SERPL-MCNC: 7 G/DL (ref 6.4–8.3)
SODIUM SERPL-SCNC: 139 MMOL/L (ref 135–145)

## 2024-03-27 PROCEDURE — 82248 BILIRUBIN DIRECT: CPT

## 2024-03-27 PROCEDURE — 80053 COMPREHEN METABOLIC PANEL: CPT

## 2024-03-27 PROCEDURE — 36415 COLL VENOUS BLD VENIPUNCTURE: CPT

## 2024-04-02 ENCOUNTER — LAB (OUTPATIENT)
Dept: LAB | Facility: CLINIC | Age: 64
End: 2024-04-02
Payer: COMMERCIAL

## 2024-04-02 DIAGNOSIS — T86.41 LIVER TRANSPLANT REJECTION (H): ICD-10-CM

## 2024-04-02 DIAGNOSIS — Z94.4 LIVER REPLACED BY TRANSPLANT (H): ICD-10-CM

## 2024-04-02 DIAGNOSIS — Z13.220 LIPID SCREENING: ICD-10-CM

## 2024-04-02 LAB
ALBUMIN SERPL BCG-MCNC: 4.1 G/DL (ref 3.5–5.2)
ALP SERPL-CCNC: 90 U/L (ref 40–150)
ALT SERPL W P-5'-P-CCNC: 19 U/L (ref 0–50)
ANION GAP SERPL CALCULATED.3IONS-SCNC: 8 MMOL/L (ref 7–15)
AST SERPL W P-5'-P-CCNC: 33 U/L (ref 0–45)
BILIRUB DIRECT SERPL-MCNC: 0.32 MG/DL (ref 0–0.3)
BILIRUB SERPL-MCNC: 1.5 MG/DL
BUN SERPL-MCNC: 23.4 MG/DL (ref 8–23)
CALCIUM SERPL-MCNC: 9.5 MG/DL (ref 8.8–10.2)
CHLORIDE SERPL-SCNC: 106 MMOL/L (ref 98–107)
CREAT SERPL-MCNC: 1.06 MG/DL (ref 0.51–0.95)
DEPRECATED HCO3 PLAS-SCNC: 27 MMOL/L (ref 22–29)
EGFRCR SERPLBLD CKD-EPI 2021: 59 ML/MIN/1.73M2
GLUCOSE SERPL-MCNC: 90 MG/DL (ref 70–99)
POTASSIUM SERPL-SCNC: 5.4 MMOL/L (ref 3.4–5.3)
PROT SERPL-MCNC: 7.1 G/DL (ref 6.4–8.3)
SODIUM SERPL-SCNC: 141 MMOL/L (ref 135–145)

## 2024-04-02 PROCEDURE — 36415 COLL VENOUS BLD VENIPUNCTURE: CPT

## 2024-04-02 PROCEDURE — 82248 BILIRUBIN DIRECT: CPT

## 2024-04-02 PROCEDURE — 80053 COMPREHEN METABOLIC PANEL: CPT

## 2024-04-17 NOTE — PROVIDER NOTIFICATION
"\"MARTINEZ  209 7A Liver transplant surg. Pt needs Observation goals ordered. Thanks Jamie [id number]\"        Paged surgery cross cover at [phone]    "
04-17    135  |  101  |  41<H>  ----------------------------<  274<H>  4.4   |  23  |  4.48<H>    Ca    9.0      17 Apr 2024 05:30  Phos  5.9     04-17  Mg     2.1     04-17    TPro  7.5  /  Alb  3.6  /  TBili  0.2  /  DBili  x   /  AST  14  /  ALT  11  /  AlkPhos  103  04-16  POCT Blood Glucose.: 244 mg/dL (04-17-24 @ 12:31)  A1C with Estimated Average Glucose Result: 9.9 % (04-16-24 @ 10:00)

## 2024-04-19 ENCOUNTER — TELEPHONE (OUTPATIENT)
Dept: TRANSPLANT | Facility: CLINIC | Age: 64
End: 2024-04-19
Payer: COMMERCIAL

## 2024-04-23 ENCOUNTER — MYC MEDICAL ADVICE (OUTPATIENT)
Dept: TRANSPLANT | Facility: CLINIC | Age: 64
End: 2024-04-23
Payer: COMMERCIAL

## 2024-04-23 NOTE — TELEPHONE ENCOUNTER
2/28: Per dr Leventhal patient to wean off cellcept. Pt taking cellept 250 mg BID. Pt to wean to 250 mg daily and repeat labs in a week.    LFTs have been stable. Ok to stop. Recheck labs in one week.     Recommended patient contact PCP for DEXA recommendations.

## 2024-04-24 ENCOUNTER — LAB REQUISITION (OUTPATIENT)
Dept: LAB | Facility: CLINIC | Age: 64
End: 2024-04-24

## 2024-04-24 DIAGNOSIS — E03.9 HYPOTHYROIDISM, UNSPECIFIED: ICD-10-CM

## 2024-04-24 PROCEDURE — 84443 ASSAY THYROID STIM HORMONE: CPT | Performed by: FAMILY MEDICINE

## 2024-04-24 NOTE — ANESTHESIA PREPROCEDURE EVALUATION
Anesthesia Pre-Procedure Evaluation    Patient: Nicci Pemberton   MRN: [id number] : 1960        Preoperative Diagnosis: * No surgery found *   Procedure :      Past Medical History:   Diagnosis Date     Anemia      Cellulitis      Cirrhosis of liver (H) 2018     Gastroesophageal reflux disease      Heterozygous alpha 1-antitrypsin deficiency (H)      High hepatic iron concentration determined by biopsy of liver      Liver cirrhosis secondary to ANGULO (H)      Liver transplanted (H) 2019     Lymphedema      Osteoarthritis     knees     SBP (spontaneous bacterial peritonitis) (H) 2019 in [facility]     Thrombocytopenia (H) 2020     Thyroid disease     Hypothyroid      Past Surgical History:   Procedure Laterality Date     ARTHROPLASTY KNEE Right 10/23/2020    Procedure: Right  total knee arthroplasty;  Surgeon: Mario Wallace MD;  Location: [facility]     BENCH LIVER N/A 2019    Procedure: BACKBENCH PREPARATION, LIVER;  Surgeon: Mandeep Alford MD;  Location: [facility]     COLONOSCOPY N/A 2018    Procedure: COLONOSCOPY;  colonoscopy;  Surgeon: Hugo Patel MD;  [facility]     ENDOSCOPIC RETROGRADE CHOLANGIOPANCREATOGRAM N/A 2/10/2020    Procedure: ENDOSCOPIC RETROGRADE CHOLANGIOPANCREATOGRAPHY with spinchterotomy;  Surgeon: Guru Rigoberto Canada MD;  [facility]     ENDOSCOPIC RETROGRADE CHOLANGIOPANCREATOGRAM N/A 2020    Procedure: ENDOSCOPIC RETROGRADE CHOLANGIOPANCREATOGRAPHY,Stent exchange and sludge removal;  Surgeon: Guru Rigoberto Canada MD;  [facility]     ENDOSCOPIC RETROGRADE CHOLANGIOPANCREATOGRAM N/A 2021    Procedure: ENDOSCOPIC RETROGRADE CHOLANGIOPANCREATOGRAPHY, SPINCTEROTOMY, DEBRIDE REMOVAL, STENT PLACEMENT;  Surgeon: Guru Rigoberto Canada MD;  [facility]     ENDOSCOPIC RETROGRADE CHOLANGIOPANCREATOGRAPHY, EXCHANGE TUBE/STENT N/A 3/4/2020    Procedure: ENDOSCOPIC RETROGRADE  CHOLANGIOPANCREATOGRAPHY, WITH biliary dilarion, stent exchange, stone removal;  Surgeon: Guru Rigoberto Canada MD;  [facility]     ENDOSCOPIC RETROGRADE CHOLANGIOPANCREATOGRAPHY, EXCHANGE TUBE/STENT N/A 2021    Procedure: ENDOSCOPIC RETROGRADE CHOLANGIOPANCREATOGRAPHY WITH BILIARY STENT EXCHANGE, DILATION AND SLUDGE/STONE REMOVAL;  Surgeon: Guru Rigoberto Canada MD;  [facility]     ESOPHAGOSCOPY, GASTROSCOPY, DUODENOSCOPY (EGD), COMBINED N/A 10/31/2019    Procedure: Esophagogastroduodenoscopy, With Biopsy;  Surgeon: Nerissa Aranda MD;  [facility]     IR FEEDING TUBE PLACEMENT W FLUORO/MD  2019     IR FLUORO 0-1 HOUR  2019     IR LIVER BIOPSY PERCUTANEOUS  3/25/2021     IR LUMBAR PUNCTURE  2019     KNEE SURGERY  10/23/20     TRANSPLANT LIVER RECIPIENT  DONOR N/A 2019    Procedure: TRANSPLANT, LIVER, RECIPIENT,  DONOR;  Surgeon: Mandeep Alford MD;  [facility]     US PARACENTESIS  2019     US PARACENTESIS WITH ALBUMIN  2019      Allergies   Allergen Reactions     Ciprofloxacin Dizziness and Nausea     Food      Fruits with cores and pits  Apples     Sulfa Drugs Other (See Comments)     Swollen joints     Furosemide Rash      Social History     Tobacco Use     Smoking status: Former Smoker     Packs/day: 0.50     Years: 10.00     Pack years: 5.00     Types: Cigarettes     Start date: 2000     Quit date:      Years since quittin.9     Smokeless tobacco: Never Used   Substance Use Topics     Alcohol use: No     Comment: due to liver transplant,       Wt Readings from Last 1 Encounters:   21 101 kg (222 lb 10.6 oz)        Anesthesia Evaluation   Pt has had prior anesthetic. Type: General and MAC.    History of anesthetic complications   Slow to wake..    ROS/MED HX  ENT/Pulmonary:     (+) DIXIE risk factors, obese, tobacco use (Quit 10 years ago.  Social smoker (weekends).), Past use,      Neurologic:  - neg neurologic ROS     Cardiovascular: Comment: Denies cardiac symptoms including chest pain, SOB, palpitations, syncope, CAMARA, orthopnea, or PND.    Dobutamine stress echocardiogram test negative for ischemia on 2019.    (+) -----Previous cardiac testing   Echo: Date: Results:    Stress Test: Date: 2019 Results:    ECG Reviewed: Date: Results:    Cath: Date: Results:   (-) taking anticoagulants/antiplateletsHypertension: Denies a h/o HTN.   METS/Exercise Tolerance:  Comment: METS<4 secondary to knee pain.  Able to walk mailbox daily (one block) slow and steady due to left knee pain.    Hematologic: Comments: H/o thromobocytopenia    (+) history of blood transfusion, no previous transfusion reaction, Known PRBC Anitbodies:No  (-) history of blood clots   Musculoskeletal: Comment: I  Degenerative Joint disease s/p right TKA (10/2020). In the evening will use a walker as her left knee is sore.  If she is out and about, will use cane.   (+) arthritis,     GI/Hepatic: Comment: CBD stenosis managed with ERCP and stenting. Last occurrence 5/2021.        (+) GERD, Asymptomatic on medication, hepatitis type Other, liver disease (s/p transplant.  Acute rejection earlier this year now back to baseline. ),     Renal/Genitourinary:  - neg Renal ROS   (+) renal disease, type: CRI, Pt does not require dialysis,     Endo: Comment: Dexamethasone infusions  March 2021 for liver tx rejection and then Prednisone taper.  Completed taper ~one a month ago.     (+) thyroid problem, hypothyroidism, Obesity,     Psychiatric/Substance Use:  - neg psychiatric ROS     Infectious Disease: Comment: COVID (+) Asymptomatic in December 2020.  Already in hospital for diverticulitis.      COVID (+) May 2021 Had already received COVID vaccine).     (+) C.Diff while hospitalized for liver transplant.  - neg infectious disease ROS     Malignancy:  - neg malignancy ROS     Other: Comment: Lymphedema LE b/l - wears compression stockings  daily.            Physical Exam    Airway          Neck ROM: full     Respiratory Devices and Support         Dental           Cardiovascular             Pulmonary                   OUTSIDE LABS:  CBC:   Lab Results   Component Value Date    WBC 5.3 09/10/2021    WBC 5.8 08/26/2021    HGB 13.1 09/10/2021    HGB 13.4 08/26/2021    HCT 38.4 09/10/2021    HCT 38.9 08/26/2021     (L) 09/10/2021     (L) 08/26/2021     BMP:   Lab Results   Component Value Date     09/10/2021     08/26/2021    POTASSIUM 4.3 09/10/2021    POTASSIUM 4.5 08/26/2021    CHLORIDE 109 09/10/2021    CHLORIDE 107 08/26/2021    CO2 28 09/10/2021    CO2 26 08/26/2021    BUN 20 09/10/2021    BUN 22 08/26/2021    CR 1.11 (H) 09/10/2021    CR 1.05 (H) 08/26/2021    GLC 88 09/10/2021    GLC 92 08/26/2021     COAGS:   Lab Results   Component Value Date    PTT 36 12/06/2020    INR 0.91 05/12/2021    FIBR 682 (H) 12/10/2020     POC:   Lab Results   Component Value Date    BGM 98 05/12/2021    HCGS Negative 06/18/2018     HEPATIC:   Lab Results   Component Value Date    ALBUMIN 3.5 09/10/2021    PROTTOTAL 7.0 09/10/2021    ALT 27 09/10/2021    AST 36 09/10/2021    ALKPHOS 147 09/10/2021    BILITOTAL 1.8 (H) 09/10/2021    DOROTHEA 23 09/04/2019     OTHER:   Lab Results   Component Value Date    PH 7.46 (H) 09/11/2019    LACT 0.8 10/28/2020    A1C 5.0 08/18/2019    JACOB 8.4 (L) 09/10/2021    PHOS 3.8 07/28/2020    MAG 1.8 12/06/2020    LIPASE 46 (L) 05/12/2021    AMYLASE 44 05/12/2021    TSH 0.94 08/19/2021    T4 0.70 (L) 09/11/2019    CRP 51.6 (H) 12/10/2020     (H) 10/28/2020             PAC Discussion and Assessment    ASA Classification: 3  Case is suitable for: [facility]  Anesthetic techniques and relevant risks discussed: GA                  PAC Resident/NP Anesthesia Assessment: Nicci Pemberton is a 61-year-old female scheduled for Left total knee arthroplasty - Left with Mario Wallace MD on 9/24/2021 at [facility]  "under general anesthesia.       Ms. Pemberton has a past medical history significant for liver cirrhosis 2/2 ANGULO s/p liver transplant (8/2019), immunosuppressed, obesity, thrombocytopenia, degenerative joint disease s/p right RKA 10/2020, hypothyroidism, former smoker (quit 2011), b/l lower extremity lymphedema, common bile duct stenosis treated with ERCP and stenting, CKD and GERD.   Ms. Pemberton was seen by Dr. Wallace on 8/26/2021 for her degenerative joint disease of her knees b/l. She is status post right total knee arthroplasty with Dr. Wallace on 10/23/2020.  She has done well with her right knee replacement and endorsed marked pain on her contralateral left knee. She was interested in surgical intervention for her left knee.  Dr. Wallace counseled her on the above surgery.  Per Dr. Wallace's note,\" Postoperatively we will use the same regimen as her prior knee surgery in terms of aspirin for DVT prophylaxis for 1 month duration\".  Ms. Pemberton presents to the PAC virtually today in preparation for the above surgery.      Diagnosis     Left knee pain     PROCEDURES     XR Knee b/l 8/26/2021     2 views right and left knee radiographs 8/26/2021 11:27 AM     History: Status post right knee replacement      Comparison: Radiographs 11/23/2020, 6/25/2020     Findings:     AP and lateral views of the right and left knee were obtained.      Left: No acute osseous abnormality.  Small joint effusion.     Severe joint space narrowing of the medial compartment, similar to     prior.     Soft tissue is unremarkable.     Right:       Postsurgical changes of total right knee arthroplasty. Hardware     appears intact without evidence of complication.     No acute osseous abnormality.  No substantial joint effusion.     No patellar tilt or lateral subluxation.  Soft tissue is unremarkable.                                                                   Impression:     1. Postsurgical changes of total right knee arthroplasty. No evidence "     of hardware complication.     2. Severe joint space narrowing of the left medial compartment.       I have personally reviewed the examination and initial interpretation     and I agree with the findings.      ANUEL BERMEO MD (Joe)     EKG 12/5/2020: Sinus rhythm     STRESS ECHOCARDIOGRAM  2019     Interpretation Summary     Conclusions: dobutamine stress echocardiogram test negative for ischemia at     diagnostic level of peak stress (87% MPHR).           Patient experienced no chest pain with peak stress.     Test terminated due to target HR achieved.     Normal BP and HR response to dobutamine.     At baseline, LVEF 65%, normal wall motion.     At low dose dobutamine, LV cavity decreases appropriately. LVEF 70%. All walls     recruit normally.     At peak dose dobutamine, LVEF > 75% with normal wall motion.     EKG at rest and with stress with no ischemic changes.     Screening 2D echocardiogram with Doppler interrogation demonstrated no     significant valvular disease. Normal size proximal ascending aorta.          --   The patient's records and results personally reviewed by this provider.     Outside records reviewed from: care everywhere    ASSESSMENT and PLAN    Specific operative considerations:   - DIXIE # of risks 3/8 = intermediate  - VTE risk:  0.5%  - Risk of PONV score = 3.  If > 2, anti-emetic intervention recommended.      #  Cardiology   - Denies known coronary artery disease.  Denies cardiac symptoms. Records indicate a hx of HTN.  Patient denies this.  METS:  < 4.  Attributes METS to left knee pain.  DSE (2019) negative for ischemia  Intermediate risk surgery. RCRI : No serious cardiac risks.  0.4 % risk of major adverse cardiac event.         #  Pulmonary   - Quit smoking 2011  - Denies pulmonary symptoms  - No inhalers     #  Hematology   -  Thrombocytopenia, Plts 128 (9/10/21)     #  GI    - GERD, take H2B DOS   - Common bile duct stenosis treated with ERCP and stenting  ().    # Hepatic  - Liver Cirrhosis 2/2 ANGULO s/p liver transplant 2019.  Treated earlier this year for rejection with high dose steroids.  Completed steroid taper ~one month ago.  Take immunosuppression medication DOS.     # Renal   - CKD with GFR 54 and Cr 1.11.       #  Endocrine     -  Obesity, BMI 40. Consideration for careful position and lifting techniques.    - Hypothyroidism, take Levothyroxine DOS.    #  Musculoskeletal   - degenerative joint disease of knees, b/l, s/p right TKA 10/23/2020 with Dr. Wallace.  Now ready to move forward with left knee due to progressive knee pain limiting mobility.    - Lymphedema of LE, b/l.  Wears compression stockings.     #  ID  - COVID-19 testing per surgeon's office  - (+) COVID 2020 and 2021 >>>asymptomatic both time.     #   Anesthesia considerations   -  Refer to PAC assessment in anesthesia records      Arrival time, NPO, shower and medication instructions provided by nursing staff today.    Patient was discussed with Dr Vee.  Patient is an acceptable candidate for the proposed procedure.  No further diagnostic evaluation is needed.       **For further details of assessment, testing, and physical exam please see H and P completed on same date.  --    TYPE OF VISIT  Type of service:  Video Visit    Patient verbally consented to video service today: YES    Two identifiers used:  yes (name and )    Video Start Time: 10:36  Video End Time (time video stopped): 11:00    Originating Location (pt. Location): Home    Distant Location (provider location):  home    Mode of Communication:  Video Conference via [facility]    Please note, because this was a virtual visit, a full physical could not be completed.  On the DOS, the OOD of anesthesia will complete the appropriate components of the physical exam.              Reviewed and Signed by PAC Mid-Level Provider/Resident  Mid-Level Provider/Resident: Sherrie Chavarria APRN CNP  Date:  9/15/2021                                 Sherrie Chavarria, SANTINO CNP   81

## 2024-04-25 LAB — TSH SERPL DL<=0.005 MIU/L-ACNC: 0.56 UIU/ML (ref 0.3–4.2)

## 2024-05-02 ENCOUNTER — VIRTUAL VISIT (OUTPATIENT)
Dept: SURGERY | Facility: CLINIC | Age: 64
End: 2024-05-02
Payer: COMMERCIAL

## 2024-05-02 DIAGNOSIS — E66.01 OBESITY, CLASS III, BMI 40-49.9 (MORBID OBESITY) (H): Primary | ICD-10-CM

## 2024-05-02 DIAGNOSIS — Z71.3 NUTRITIONAL COUNSELING: ICD-10-CM

## 2024-05-02 PROCEDURE — 97803 MED NUTRITION INDIV SUBSEQ: CPT | Mod: 95

## 2024-05-02 NOTE — LETTER
5/2/2024         RE: Nicci Pemberton  [address] [place] [address]        Dear Colleague,    Thank you for referring your patient, Nicci Pemberton, to the [facility]. Please see a copy of my visit note below.    Nicci Pemberton is a 63 year old who is being evaluated via a billable video visit.      How would you like to obtain your AVS? MyChart  If the video visit is dropped, the invitation should be resent by: Text to cell phone: [phone]  Will anyone else be joining your video visit? No -  is in the room        Medical  Weight Loss Follow-Up Diet Evaluation  Assessment:  Nicci is presenting today for a follow up weight management nutrition consultation.  This patient has had an initial appointment and was referred by Dr. Andujar for MNT as treatment for Morbid obesity.  Weight loss medication: Semaglutide. - not started   Pt's weight is 220 lbs  Initial weight: 229 lbs  Weight change: 9 lbs          No data to display              BMI: There is no height or weight on file to calculate BMI.  Ideal body weight: 50.1 kg (110 lb 7.9 oz)  Adjusted ideal body weight: 71.6 kg (157 lb 14.3 oz)    Estimated RMR ([place]-St Jeor equation):   1550 kcals x 1.2 (sedentary) = 1860 kcals (for weight maintenance)  Recommended Protein Intake: 60-80 grams of protein/day  Patient Active Problem List:  Patient Active Problem List   Diagnosis     Heterozygous alpha 1-antitrypsin deficiency (H)     Iron overload     Status post liver transplantation (H)     C. difficile diarrhea     Steroid-induced hyperglycemia     Immunosuppressed status (H24)     History of liver recipient (H)     Vertigo     Otitis media     Bile duct stricture (H28)     Common bile duct stenosis (H28)     Abnormal liver function     Acquired lymphedema of leg     Chronic pain of both knees     Venous hypertension of lower extremity, bilateral     Cramp in lower extremity associated with  sleep     Obesity with serious comorbidity     Edema     Hypertension, unspecified type     Gastroesophageal reflux disease without esophagitis     Hyponatremia     Hypothyroidism     Leg swelling     Lymphedema     Macrocytosis without anemia     Malaise     Fatty liver disease, nonalcoholic     Osteoarthritis of both knees, unspecified osteoarthritis type     Osteoarthritis of knee     Peripheral neuropathy     Post-menopausal bleeding     Slow transit constipation     Thrombocytopenia (H24)     Vitamin D deficiency     Zinc deficiency     Bilateral edema of lower extremity     Physical debility     Diverticulitis of colon     Suspected 2019 novel coronavirus infection     Morbid obesity (H)     S/P liver transplant (H)     Liver transplant rejection (H)     Chronic kidney disease, stage 3 (H)     Left knee pain     Status post total knee replacement     Incisional infection     Fracture of bone of left shoulder     Other hyperlipidemia       Progress on goals from last visit: Patient reports she is doing OK regarding her nutritional habits. Is slightly discouraged with lack of weight loss. Barriers include eating out. Continues to track calories via [employer], averaging ~1,300 kcal and 60-70g protein per day.   - aiming to increase her walking   - is choosing low carb and sugar free items.  - cooking with oils or spray butter vs table butter  - choosing low fat/fat foods   - purchasing fresh vegetables     Choose low fat and fat free dairy products  Increase protein intake aiming for ~20g protein per meal   Limit calorie containing beverages     Dietary Recall:  NO: apples and fruits with core and pits   Breakfast: eggs with fresh fruit (berries, bananas) Or greek yogurt (low fat) with berries OR magic spoon cereal OR oatmeal   Lunch: Or deli lunch meat with cream cheese Cottage cheese with salad   Dinner: fish (salmon, tilapia, shrimp), chicken, pork with salad  Beverages: did not get to dicuss   Exercise:  walking outside     Nutrition Diagnosis:    Morbid obesity related to excessive energy intake as evidence by patient subjective report and BMI of 40.3        Intervention:  Food and/or nutrient delivery: consistency with meals. Protein goal at each meal.   Nutrition education: discussed benefits from fib rich whole grains   Nutrition counseling: continued support and goal setting      Monitoring/Evaluation:    Goals:  Continue with current goals set   Increase whole grain fiber options      Patient to follow up in 2 month(s) with bariatrician and 2.5 month(s) with RD          Video-Visit Details    Type of service:  Video Visit    Video Start Time (time video started): 3:27 PM    Video End Time (time video stopped): 4:01 PM    Originating Location (pt. Location): Home      Distant Location (provider location):  Off-site    Mode of Communication:  Video Conference via [facility]    Physician has received verbal consent for a Video Visit from the patient? Yes      Anamaria Vargas RD           Again, thank you for allowing me to participate in the care of your patient.        Sincerely,        Anamaria Vargas RD

## 2024-05-02 NOTE — PROGRESS NOTES
Nicci Pemberton is a 63 year old who is being evaluated via a billable video visit.      How would you like to obtain your AVS? MyChart  If the video visit is dropped, the invitation should be resent by: Text to cell phone: [phone]  Will anyone else be joining your video visit? No -  is in the room        Medical  Weight Loss Follow-Up Diet Evaluation  Assessment:  Nicci is presenting today for a follow up weight management nutrition consultation.  This patient has had an initial appointment and was referred by Dr. Andujar for MNT as treatment for Morbid obesity.  Weight loss medication: Semaglutide. - not started   Pt's weight is 220 lbs  Initial weight: 229 lbs  Weight change: 9 lbs          No data to display              BMI: There is no height or weight on file to calculate BMI.  Ideal body weight: 50.1 kg (110 lb 7.9 oz)  Adjusted ideal body weight: 71.6 kg (157 lb 14.3 oz)    Estimated RMR ([place]-St Jeor equation):   1550 kcals x 1.2 (sedentary) = 1860 kcals (for weight maintenance)  Recommended Protein Intake: 60-80 grams of protein/day  Patient Active Problem List:  Patient Active Problem List   Diagnosis    Heterozygous alpha 1-antitrypsin deficiency (H)    Iron overload    Status post liver transplantation (H)    C. difficile diarrhea    Steroid-induced hyperglycemia    Immunosuppressed status (H24)    History of liver recipient (H)    Vertigo    Otitis media    Bile duct stricture (H28)    Common bile duct stenosis (H28)    Abnormal liver function    Acquired lymphedema of leg    Chronic pain of both knees    Venous hypertension of lower extremity, bilateral    Cramp in lower extremity associated with sleep    Obesity with serious comorbidity    Edema    Hypertension, unspecified type    Gastroesophageal reflux disease without esophagitis    Hyponatremia    Hypothyroidism    Leg swelling    Lymphedema    Macrocytosis without anemia    Malaise    Fatty liver disease, nonalcoholic     Osteoarthritis of both knees, unspecified osteoarthritis type    Osteoarthritis of knee    Peripheral neuropathy    Post-menopausal bleeding    Slow transit constipation    Thrombocytopenia (H24)    Vitamin D deficiency    Zinc deficiency    Bilateral edema of lower extremity    Physical debility    Diverticulitis of colon    Suspected 2019 novel coronavirus infection    Morbid obesity (H)    S/P liver transplant (H)    Liver transplant rejection (H)    Chronic kidney disease, stage 3 (H)    Left knee pain    Status post total knee replacement    Incisional infection    Fracture of bone of left shoulder    Other hyperlipidemia       Progress on goals from last visit: Patient reports she is doing OK regarding her nutritional habits. Is slightly discouraged with lack of weight loss. Barriers include eating out. Continues to track calories via [employer], averaging ~1,300 kcal and 60-70g protein per day.   - aiming to increase her walking   - is choosing low carb and sugar free items.  - cooking with oils or spray butter vs table butter  - choosing low fat/fat foods   - purchasing fresh vegetables     Choose low fat and fat free dairy products  Increase protein intake aiming for ~20g protein per meal   Limit calorie containing beverages     Dietary Recall:  NO: apples and fruits with core and pits   Breakfast: eggs with fresh fruit (berries, bananas) Or greek yogurt (low fat) with berries OR magic spoon cereal OR oatmeal   Lunch: Or deli lunch meat with cream cheese Cottage cheese with salad   Dinner: fish (salmon, tilapia, shrimp), chicken, pork with salad  Beverages: did not get to dicuss   Exercise: walking outside     Nutrition Diagnosis:    Morbid obesity related to excessive energy intake as evidence by patient subjective report and BMI of 40.3        Intervention:  Food and/or nutrient delivery: consistency with meals. Protein goal at each meal.   Nutrition education: discussed benefits from fib rich whole  grains   Nutrition counseling: continued support and goal setting      Monitoring/Evaluation:    Goals:  Continue with current goals set   Increase whole grain fiber options      Patient to follow up in 2 month(s) with bariatrician and 2.5 month(s) with RD          Video-Visit Details    Type of service:  Video Visit    Video Start Time (time video started): 3:27 PM    Video End Time (time video stopped): 4:01 PM    Originating Location (pt. Location): Home      Distant Location (provider location):  Off-site    Mode of Communication:  Video Conference via [facility]    Physician has received verbal consent for a Video Visit from the patient? Yes      Anamaria Vargas RD

## 2024-05-06 ENCOUNTER — LAB (OUTPATIENT)
Dept: LAB | Facility: CLINIC | Age: 64
End: 2024-05-06
Payer: COMMERCIAL

## 2024-05-06 DIAGNOSIS — Z13.220 LIPID SCREENING: ICD-10-CM

## 2024-05-06 DIAGNOSIS — Z94.4 LIVER REPLACED BY TRANSPLANT (H): ICD-10-CM

## 2024-05-06 DIAGNOSIS — T86.41 LIVER TRANSPLANT REJECTION (H): ICD-10-CM

## 2024-05-06 LAB
ALBUMIN SERPL BCG-MCNC: 3.9 G/DL (ref 3.5–5.2)
ALP SERPL-CCNC: 94 U/L (ref 40–150)
ALT SERPL W P-5'-P-CCNC: 16 U/L (ref 0–50)
ANION GAP SERPL CALCULATED.3IONS-SCNC: 10 MMOL/L (ref 7–15)
AST SERPL W P-5'-P-CCNC: 28 U/L (ref 0–45)
BILIRUB DIRECT SERPL-MCNC: 0.32 MG/DL (ref 0–0.3)
BILIRUB SERPL-MCNC: 1.7 MG/DL
BUN SERPL-MCNC: 23.4 MG/DL (ref 8–23)
CALCIUM SERPL-MCNC: 9.5 MG/DL (ref 8.8–10.2)
CHLORIDE SERPL-SCNC: 108 MMOL/L (ref 98–107)
CK SERPL-CCNC: 102 U/L (ref 26–192)
CREAT SERPL-MCNC: 1.08 MG/DL (ref 0.51–0.95)
DEPRECATED HCO3 PLAS-SCNC: 25 MMOL/L (ref 22–29)
EGFRCR SERPLBLD CKD-EPI 2021: 57 ML/MIN/1.73M2
GLUCOSE SERPL-MCNC: 96 MG/DL (ref 70–99)
POTASSIUM SERPL-SCNC: 4.9 MMOL/L (ref 3.4–5.3)
PROT SERPL-MCNC: 6.9 G/DL (ref 6.4–8.3)
SODIUM SERPL-SCNC: 143 MMOL/L (ref 135–145)

## 2024-05-06 PROCEDURE — 82248 BILIRUBIN DIRECT: CPT

## 2024-05-06 PROCEDURE — 80053 COMPREHEN METABOLIC PANEL: CPT

## 2024-05-06 PROCEDURE — 36415 COLL VENOUS BLD VENIPUNCTURE: CPT

## 2024-05-06 PROCEDURE — 82550 ASSAY OF CK (CPK): CPT

## 2024-05-23 ENCOUNTER — PRE VISIT (OUTPATIENT)
Dept: OTOLARYNGOLOGY | Facility: CLINIC | Age: 64
End: 2024-05-23

## 2024-05-29 ENCOUNTER — LAB (OUTPATIENT)
Dept: LAB | Facility: CLINIC | Age: 64
End: 2024-05-29
Payer: COMMERCIAL

## 2024-05-29 DIAGNOSIS — Z94.4 LIVER REPLACED BY TRANSPLANT (H): ICD-10-CM

## 2024-05-29 DIAGNOSIS — T86.41 LIVER TRANSPLANT REJECTION (H): ICD-10-CM

## 2024-05-29 DIAGNOSIS — Z13.220 LIPID SCREENING: ICD-10-CM

## 2024-05-29 LAB
ALBUMIN SERPL BCG-MCNC: 4 G/DL (ref 3.5–5.2)
ALP SERPL-CCNC: 96 U/L (ref 40–150)
ALT SERPL W P-5'-P-CCNC: 21 U/L (ref 0–50)
ANION GAP SERPL CALCULATED.3IONS-SCNC: 12 MMOL/L (ref 7–15)
AST SERPL W P-5'-P-CCNC: 37 U/L (ref 0–45)
BILIRUB DIRECT SERPL-MCNC: 0.27 MG/DL (ref 0–0.3)
BILIRUB SERPL-MCNC: 1.2 MG/DL
BUN SERPL-MCNC: 21.2 MG/DL (ref 8–23)
CALCIUM SERPL-MCNC: 9.7 MG/DL (ref 8.8–10.2)
CHLORIDE SERPL-SCNC: 106 MMOL/L (ref 98–107)
CHOLEST SERPL-MCNC: 147 MG/DL
CK SERPL-CCNC: 96 U/L (ref 26–192)
CREAT SERPL-MCNC: 1.06 MG/DL (ref 0.51–0.95)
DEPRECATED HCO3 PLAS-SCNC: 25 MMOL/L (ref 22–29)
EGFRCR SERPLBLD CKD-EPI 2021: 59 ML/MIN/1.73M2
ERYTHROCYTE [DISTWIDTH] IN BLOOD BY AUTOMATED COUNT: 11.7 % (ref 10–15)
FASTING STATUS PATIENT QL REPORTED: YES
FASTING STATUS PATIENT QL REPORTED: YES
GLUCOSE SERPL-MCNC: 93 MG/DL (ref 70–99)
HCT VFR BLD AUTO: 39.4 % (ref 35–47)
HDLC SERPL-MCNC: 51 MG/DL
HGB BLD-MCNC: 13.3 G/DL (ref 11.7–15.7)
LDLC SERPL CALC-MCNC: 83 MG/DL
MCH RBC QN AUTO: 33.8 PG (ref 26.5–33)
MCHC RBC AUTO-ENTMCNC: 33.8 G/DL (ref 31.5–36.5)
MCV RBC AUTO: 100 FL (ref 78–100)
NONHDLC SERPL-MCNC: 96 MG/DL
PLATELET # BLD AUTO: 153 10E3/UL (ref 150–450)
POTASSIUM SERPL-SCNC: 5.1 MMOL/L (ref 3.4–5.3)
PROT SERPL-MCNC: 7.2 G/DL (ref 6.4–8.3)
RBC # BLD AUTO: 3.94 10E6/UL (ref 3.8–5.2)
SODIUM SERPL-SCNC: 143 MMOL/L (ref 135–145)
TRIGL SERPL-MCNC: 65 MG/DL
WBC # BLD AUTO: 5.2 10E3/UL (ref 4–11)

## 2024-05-29 PROCEDURE — 36415 COLL VENOUS BLD VENIPUNCTURE: CPT

## 2024-05-29 PROCEDURE — 85027 COMPLETE CBC AUTOMATED: CPT

## 2024-05-29 PROCEDURE — 80158 DRUG ASSAY CYCLOSPORINE: CPT

## 2024-05-29 PROCEDURE — 82248 BILIRUBIN DIRECT: CPT

## 2024-05-29 PROCEDURE — 80061 LIPID PANEL: CPT

## 2024-05-29 PROCEDURE — 80053 COMPREHEN METABOLIC PANEL: CPT

## 2024-05-29 PROCEDURE — 82550 ASSAY OF CK (CPK): CPT

## 2024-05-30 LAB
CYCLOSPORINE BLD LC/MS/MS-MCNC: 90 UG/L (ref 50–400)
TME LAST DOSE: NORMAL H
TME LAST DOSE: NORMAL H

## 2024-07-03 ENCOUNTER — TELEPHONE (OUTPATIENT)
Dept: TRANSPLANT | Facility: CLINIC | Age: 64
End: 2024-07-03
Payer: COMMERCIAL

## 2024-07-03 DIAGNOSIS — Z13.220 LIPID SCREENING: ICD-10-CM

## 2024-07-03 RX ORDER — ROSUVASTATIN CALCIUM 5 MG/1
5 TABLET, COATED ORAL DAILY
Qty: 90 TABLET | Refills: 0 | Status: SHIPPED | OUTPATIENT
Start: 2024-07-03

## 2024-07-03 NOTE — TELEPHONE ENCOUNTER
Spoke to pt and let her know that we will refill the rosuvastatin one more time but that further fills will need to go through the PCP.

## 2024-07-11 ENCOUNTER — TELEPHONE (OUTPATIENT)
Dept: TRANSPLANT | Facility: CLINIC | Age: 64
End: 2024-07-11
Payer: COMMERCIAL

## 2024-07-11 ENCOUNTER — TELEPHONE (OUTPATIENT)
Dept: ORTHOPEDICS | Facility: CLINIC | Age: 64
End: 2024-07-11
Payer: COMMERCIAL

## 2024-07-11 NOTE — TELEPHONE ENCOUNTER
Called pt back. She has had no injury to the leg. She complains of severe leg pain and had fever, chills, and body aches a night ago. Those symptoms have subsided. She can not bear weight on her leg currently and when bending pain level goes up.       Discussed with patient that I can add her on Monday but she should be seen prior to that. Did discuss pts symptoms with Dr. Hernandez Fellow. Who agreed with the plan given to patient. Patient is going to wait it  out tonight and come down to the ED in the morning. She was informed that if any of the systemic symptoms come back that she needs to come down to the ED right away.       Katya BROOKS

## 2024-07-11 NOTE — TELEPHONE ENCOUNTER
Other: Requesting c/b- excruciating pain in right knee. Started yesterday. No known injury.     Could we send this information to you in [employer] srlt or would you prefer to receive a phone call?:   Patient would prefer a phone call   Okay to leave a detailed message?: No at Home number on file [phone] (home)

## 2024-07-11 NOTE — TELEPHONE ENCOUNTER
Spoke with Nicci. Her right knee she can't bear weight. She is 15/10 pain.     She did have a fever.     Pt will do [facility] and if no availability will go to the ER.

## 2024-07-12 ENCOUNTER — APPOINTMENT (OUTPATIENT)
Dept: GENERAL RADIOLOGY | Facility: CLINIC | Age: 64
DRG: 486 | End: 2024-07-12
Attending: EMERGENCY MEDICINE
Payer: COMMERCIAL

## 2024-07-12 ENCOUNTER — APPOINTMENT (OUTPATIENT)
Dept: ULTRASOUND IMAGING | Facility: CLINIC | Age: 64
DRG: 486 | End: 2024-07-12
Attending: EMERGENCY MEDICINE
Payer: COMMERCIAL

## 2024-07-12 ENCOUNTER — HOSPITAL ENCOUNTER (INPATIENT)
Facility: CLINIC | Age: 64
LOS: 4 days | Discharge: HOME | DRG: 486 | End: 2024-07-16
Attending: EMERGENCY MEDICINE | Admitting: INTERNAL MEDICINE
Payer: COMMERCIAL

## 2024-07-12 DIAGNOSIS — M25.562 CHRONIC PAIN OF LEFT KNEE: ICD-10-CM

## 2024-07-12 DIAGNOSIS — Z98.890 S/P DEBRIDEMENT: ICD-10-CM

## 2024-07-12 DIAGNOSIS — M00.9 SEPTIC ARTHRITIS (H): ICD-10-CM

## 2024-07-12 DIAGNOSIS — G89.29 CHRONIC PAIN OF LEFT KNEE: ICD-10-CM

## 2024-07-12 DIAGNOSIS — M00.9 PYOGENIC ARTHRITIS OF RIGHT KNEE JOINT, DUE TO UNSPECIFIED ORGANISM (H): ICD-10-CM

## 2024-07-12 DIAGNOSIS — T84.50XA PROSTHETIC JOINT INFECTION (H): Primary | ICD-10-CM

## 2024-07-12 DIAGNOSIS — M00.9: ICD-10-CM

## 2024-07-12 DIAGNOSIS — T86.41 LIVER TRANSPLANT REJECTION (H): ICD-10-CM

## 2024-07-12 DIAGNOSIS — M25.561 PAIN AND SWELLING OF RIGHT KNEE: ICD-10-CM

## 2024-07-12 DIAGNOSIS — M25.461 PAIN AND SWELLING OF RIGHT KNEE: ICD-10-CM

## 2024-07-12 PROBLEM — N18.30 CHRONIC KIDNEY DISEASE, STAGE 3 (H): Status: ACTIVE | Noted: 2021-09-15

## 2024-07-12 PROBLEM — D69.6 THROMBOCYTOPENIA (H): Chronic | Status: ACTIVE | Noted: 2020-06-25

## 2024-07-12 LAB
ABO/RH(D): NORMAL
ALBUMIN SERPL BCG-MCNC: 3.5 G/DL (ref 3.5–5.2)
ALP SERPL-CCNC: 98 U/L (ref 40–150)
ALT SERPL W P-5'-P-CCNC: 25 U/L (ref 0–50)
ANION GAP SERPL CALCULATED.3IONS-SCNC: 10 MMOL/L (ref 7–15)
ANTIBODY SCREEN: NEGATIVE
APPEARANCE FLD: ABNORMAL
AST SERPL W P-5'-P-CCNC: 32 U/L (ref 0–45)
BASOPHILS # BLD AUTO: 0 10E3/UL (ref 0–0.2)
BASOPHILS NFR BLD AUTO: 0 %
BILIRUB DIRECT SERPL-MCNC: 0.39 MG/DL (ref 0–0.3)
BILIRUB SERPL-MCNC: 1.7 MG/DL
BUN SERPL-MCNC: 30.5 MG/DL (ref 8–23)
CALCIUM SERPL-MCNC: 8.7 MG/DL (ref 8.8–10.2)
CELL COUNT BODY FLUID SOURCE: ABNORMAL
CHLORIDE SERPL-SCNC: 98 MMOL/L (ref 98–107)
COLOR FLD: YELLOW
CREAT SERPL-MCNC: 1.13 MG/DL (ref 0.51–0.95)
CRP SERPL-MCNC: 297.32 MG/L
CRYSTALS SNV MICRO: NORMAL
DEPRECATED HCO3 PLAS-SCNC: 25 MMOL/L (ref 22–29)
EGFRCR SERPLBLD CKD-EPI 2021: 54 ML/MIN/1.73M2
EOSINOPHIL # BLD AUTO: 0 10E3/UL (ref 0–0.7)
EOSINOPHIL NFR BLD AUTO: 0 %
ERYTHROCYTE [DISTWIDTH] IN BLOOD BY AUTOMATED COUNT: 12.1 % (ref 10–15)
ERYTHROCYTE [SEDIMENTATION RATE] IN BLOOD BY WESTERGREN METHOD: 83 MM/HR (ref 0–30)
FLUAV RNA SPEC QL NAA+PROBE: NEGATIVE
FLUBV RNA RESP QL NAA+PROBE: NEGATIVE
GLUCOSE SERPL-MCNC: 156 MG/DL (ref 70–99)
HCT VFR BLD AUTO: 35.3 % (ref 35–47)
HGB BLD-MCNC: 12.1 G/DL (ref 11.7–15.7)
IMM GRANULOCYTES # BLD: 0.3 10E3/UL
IMM GRANULOCYTES NFR BLD: 2 %
LACTATE SERPL-SCNC: 1.7 MMOL/L (ref 0.7–2)
LYMPHOCYTES # BLD AUTO: 0.7 10E3/UL (ref 0.8–5.3)
LYMPHOCYTES NFR BLD AUTO: 5 %
LYMPHOCYTES NFR FLD MANUAL: 0 %
MCH RBC QN AUTO: 34.1 PG (ref 26.5–33)
MCHC RBC AUTO-ENTMCNC: 34.3 G/DL (ref 31.5–36.5)
MCV RBC AUTO: 99 FL (ref 78–100)
MONOCYTES # BLD AUTO: 1.2 10E3/UL (ref 0–1.3)
MONOCYTES NFR BLD AUTO: 8 %
MONOS+MACROS NFR FLD MANUAL: 4 %
NEUTROPHILS # BLD AUTO: 12.4 10E3/UL (ref 1.6–8.3)
NEUTROPHILS NFR BLD AUTO: 85 %
NEUTS BAND NFR FLD MANUAL: 96 %
NRBC # BLD AUTO: 0 10E3/UL
NRBC BLD AUTO-RTO: 0 /100
PLATELET # BLD AUTO: 99 10E3/UL (ref 150–450)
POTASSIUM SERPL-SCNC: 3.6 MMOL/L (ref 3.4–5.3)
PROT SERPL-MCNC: 6.6 G/DL (ref 6.4–8.3)
RBC # BLD AUTO: 3.55 10E6/UL (ref 3.8–5.2)
RSV RNA SPEC NAA+PROBE: NEGATIVE
SARS-COV-2 RNA RESP QL NAA+PROBE: NEGATIVE
SODIUM SERPL-SCNC: 133 MMOL/L (ref 135–145)
SPECIMEN EXPIRATION DATE: NORMAL
WBC # BLD AUTO: 14.7 10E3/UL (ref 4–11)
WBC # FLD AUTO: ABNORMAL /UL

## 2024-07-12 PROCEDURE — 80048 BASIC METABOLIC PNL TOTAL CA: CPT | Performed by: EMERGENCY MEDICINE

## 2024-07-12 PROCEDURE — 99223 1ST HOSP IP/OBS HIGH 75: CPT | Mod: FS | Performed by: PHYSICIAN ASSISTANT

## 2024-07-12 PROCEDURE — 87637 SARSCOV2&INF A&B&RSV AMP PRB: CPT | Performed by: PHYSICIAN ASSISTANT

## 2024-07-12 PROCEDURE — 82248 BILIRUBIN DIRECT: CPT | Performed by: PHYSICIAN ASSISTANT

## 2024-07-12 PROCEDURE — 0S9C3ZZ DRAINAGE OF RIGHT KNEE JOINT, PERCUTANEOUS APPROACH: ICD-10-PCS | Performed by: STUDENT IN AN ORGANIZED HEALTH CARE EDUCATION/TRAINING PROGRAM

## 2024-07-12 PROCEDURE — 99285 EMERGENCY DEPT VISIT HI MDM: CPT | Performed by: EMERGENCY MEDICINE

## 2024-07-12 PROCEDURE — 250N000011 HC RX IP 250 OP 636: Performed by: PHYSICIAN ASSISTANT

## 2024-07-12 PROCEDURE — 83605 ASSAY OF LACTIC ACID: CPT | Performed by: EMERGENCY MEDICINE

## 2024-07-12 PROCEDURE — 36415 COLL VENOUS BLD VENIPUNCTURE: CPT | Performed by: EMERGENCY MEDICINE

## 2024-07-12 PROCEDURE — 96374 THER/PROPH/DIAG INJ IV PUSH: CPT | Performed by: EMERGENCY MEDICINE

## 2024-07-12 PROCEDURE — 85025 COMPLETE CBC W/AUTO DIFF WBC: CPT | Performed by: EMERGENCY MEDICINE

## 2024-07-12 PROCEDURE — 96361 HYDRATE IV INFUSION ADD-ON: CPT | Performed by: EMERGENCY MEDICINE

## 2024-07-12 PROCEDURE — 258N000003 HC RX IP 258 OP 636: Performed by: INTERNAL MEDICINE

## 2024-07-12 PROCEDURE — 80053 COMPREHEN METABOLIC PANEL: CPT | Performed by: EMERGENCY MEDICINE

## 2024-07-12 PROCEDURE — 89050 BODY FLUID CELL COUNT: CPT | Performed by: STUDENT IN AN ORGANIZED HEALTH CARE EDUCATION/TRAINING PROGRAM

## 2024-07-12 PROCEDURE — 83518 IMMUNOASSAY DIPSTICK: CPT | Performed by: STUDENT IN AN ORGANIZED HEALTH CARE EDUCATION/TRAINING PROGRAM

## 2024-07-12 PROCEDURE — 258N000003 HC RX IP 258 OP 636: Performed by: EMERGENCY MEDICINE

## 2024-07-12 PROCEDURE — 96376 TX/PRO/DX INJ SAME DRUG ADON: CPT | Performed by: EMERGENCY MEDICINE

## 2024-07-12 PROCEDURE — 73562 X-RAY EXAM OF KNEE 3: CPT | Mod: RT

## 2024-07-12 PROCEDURE — 86140 C-REACTIVE PROTEIN: CPT | Performed by: EMERGENCY MEDICINE

## 2024-07-12 PROCEDURE — 250N000012 HC RX MED GY IP 250 OP 636 PS 637: Performed by: PHYSICIAN ASSISTANT

## 2024-07-12 PROCEDURE — 87040 BLOOD CULTURE FOR BACTERIA: CPT | Performed by: EMERGENCY MEDICINE

## 2024-07-12 PROCEDURE — 250N000012 HC RX MED GY IP 250 OP 636 PS 637: Performed by: EMERGENCY MEDICINE

## 2024-07-12 PROCEDURE — 99207 PR APP CREDIT; MD BILLING SHARED VISIT: CPT | Mod: FS | Performed by: INTERNAL MEDICINE

## 2024-07-12 PROCEDURE — 120N000002 HC R&B MED SURG/OB UMMC

## 2024-07-12 PROCEDURE — 99285 EMERGENCY DEPT VISIT HI MDM: CPT | Mod: 25 | Performed by: EMERGENCY MEDICINE

## 2024-07-12 PROCEDURE — 85652 RBC SED RATE AUTOMATED: CPT | Performed by: EMERGENCY MEDICINE

## 2024-07-12 PROCEDURE — 99418 PROLNG IP/OBS E/M EA 15 MIN: CPT | Mod: FS | Performed by: PHYSICIAN ASSISTANT

## 2024-07-12 PROCEDURE — 258N000003 HC RX IP 258 OP 636: Performed by: PHYSICIAN ASSISTANT

## 2024-07-12 PROCEDURE — 250N000009 HC RX 250: Performed by: EMERGENCY MEDICINE

## 2024-07-12 PROCEDURE — 87205 SMEAR GRAM STAIN: CPT | Performed by: STUDENT IN AN ORGANIZED HEALTH CARE EDUCATION/TRAINING PROGRAM

## 2024-07-12 PROCEDURE — 93971 EXTREMITY STUDY: CPT | Mod: RT

## 2024-07-12 PROCEDURE — 250N000011 HC RX IP 250 OP 636: Performed by: INTERNAL MEDICINE

## 2024-07-12 PROCEDURE — 89060 EXAM SYNOVIAL FLUID CRYSTALS: CPT | Performed by: STUDENT IN AN ORGANIZED HEALTH CARE EDUCATION/TRAINING PROGRAM

## 2024-07-12 PROCEDURE — 250N000013 HC RX MED GY IP 250 OP 250 PS 637: Performed by: PHYSICIAN ASSISTANT

## 2024-07-12 PROCEDURE — 250N000011 HC RX IP 250 OP 636: Performed by: EMERGENCY MEDICINE

## 2024-07-12 PROCEDURE — 87075 CULTR BACTERIA EXCEPT BLOOD: CPT | Performed by: STUDENT IN AN ORGANIZED HEALTH CARE EDUCATION/TRAINING PROGRAM

## 2024-07-12 PROCEDURE — 86900 BLOOD TYPING SEROLOGIC ABO: CPT | Performed by: EMERGENCY MEDICINE

## 2024-07-12 RX ORDER — FAMOTIDINE 20 MG/1
20 TABLET, FILM COATED ORAL
Status: DISCONTINUED | OUTPATIENT
Start: 2024-07-12 | End: 2024-07-16 | Stop reason: HOSPADM

## 2024-07-12 RX ORDER — NALOXONE HYDROCHLORIDE 0.4 MG/ML
0.4 INJECTION, SOLUTION INTRAMUSCULAR; INTRAVENOUS; SUBCUTANEOUS
Status: DISCONTINUED | OUTPATIENT
Start: 2024-07-12 | End: 2024-07-16 | Stop reason: HOSPADM

## 2024-07-12 RX ORDER — HYDROMORPHONE HYDROCHLORIDE 1 MG/ML
0.5 INJECTION, SOLUTION INTRAMUSCULAR; INTRAVENOUS; SUBCUTANEOUS
Status: DISCONTINUED | OUTPATIENT
Start: 2024-07-12 | End: 2024-07-12

## 2024-07-12 RX ORDER — CEFTRIAXONE 2 G/1
2 INJECTION, POWDER, FOR SOLUTION INTRAMUSCULAR; INTRAVENOUS EVERY 24 HOURS
Status: DISCONTINUED | OUTPATIENT
Start: 2024-07-12 | End: 2024-07-16 | Stop reason: HOSPADM

## 2024-07-12 RX ORDER — AMOXICILLIN 500 MG/1
2000 CAPSULE ORAL ONCE
Status: ON HOLD | COMMUNITY
Start: 2024-04-23 | End: 2024-07-16

## 2024-07-12 RX ORDER — CYCLOSPORINE 25 MG/1
75 CAPSULE, LIQUID FILLED ORAL DAILY
Status: DISCONTINUED | OUTPATIENT
Start: 2024-07-13 | End: 2024-07-16 | Stop reason: HOSPADM

## 2024-07-12 RX ORDER — ROSUVASTATIN CALCIUM 5 MG/1
5 TABLET, COATED ORAL EVERY EVENING
Status: DISCONTINUED | OUTPATIENT
Start: 2024-07-12 | End: 2024-07-16 | Stop reason: HOSPADM

## 2024-07-12 RX ORDER — LIDOCAINE 40 MG/G
CREAM TOPICAL
Status: DISCONTINUED | OUTPATIENT
Start: 2024-07-12 | End: 2024-07-13

## 2024-07-12 RX ORDER — CEFAZOLIN SODIUM 1 G/50ML
1750 SOLUTION INTRAVENOUS EVERY 12 HOURS
Status: DISCONTINUED | OUTPATIENT
Start: 2024-07-12 | End: 2024-07-12

## 2024-07-12 RX ORDER — AMOXICILLIN 250 MG
2 CAPSULE ORAL 2 TIMES DAILY PRN
Status: DISCONTINUED | OUTPATIENT
Start: 2024-07-12 | End: 2024-07-16 | Stop reason: HOSPADM

## 2024-07-12 RX ORDER — ACETAMINOPHEN 325 MG/1
650 TABLET ORAL EVERY 4 HOURS PRN
Status: DISCONTINUED | OUTPATIENT
Start: 2024-07-12 | End: 2024-07-13

## 2024-07-12 RX ORDER — VANCOMYCIN HYDROCHLORIDE 1 G/200ML
1000 INJECTION, SOLUTION INTRAVENOUS EVERY 24 HOURS
Status: DISCONTINUED | OUTPATIENT
Start: 2024-07-13 | End: 2024-07-15

## 2024-07-12 RX ORDER — AMOXICILLIN 250 MG
1 CAPSULE ORAL 2 TIMES DAILY
Status: DISCONTINUED | OUTPATIENT
Start: 2024-07-12 | End: 2024-07-16 | Stop reason: HOSPADM

## 2024-07-12 RX ORDER — CEFAZOLIN SODIUM 1 G/50ML
2000 SOLUTION INTRAVENOUS ONCE
Status: COMPLETED | OUTPATIENT
Start: 2024-07-12 | End: 2024-07-12

## 2024-07-12 RX ORDER — HYDROMORPHONE HCL IN WATER/PF 6 MG/30 ML
0.2 PATIENT CONTROLLED ANALGESIA SYRINGE INTRAVENOUS
Status: DISCONTINUED | OUTPATIENT
Start: 2024-07-12 | End: 2024-07-13

## 2024-07-12 RX ORDER — POLYETHYLENE GLYCOL 3350 17 G/17G
17 POWDER, FOR SOLUTION ORAL 2 TIMES DAILY PRN
Status: DISCONTINUED | OUTPATIENT
Start: 2024-07-12 | End: 2024-07-16 | Stop reason: HOSPADM

## 2024-07-12 RX ORDER — HYDROMORPHONE HCL IN WATER/PF 6 MG/30 ML
0.4 PATIENT CONTROLLED ANALGESIA SYRINGE INTRAVENOUS
Status: DISCONTINUED | OUTPATIENT
Start: 2024-07-12 | End: 2024-07-13

## 2024-07-12 RX ORDER — AMOXICILLIN 250 MG
2 CAPSULE ORAL 2 TIMES DAILY
Status: DISCONTINUED | OUTPATIENT
Start: 2024-07-12 | End: 2024-07-16 | Stop reason: HOSPADM

## 2024-07-12 RX ORDER — OXYCODONE HYDROCHLORIDE 5 MG/1
5 TABLET ORAL EVERY 4 HOURS PRN
Status: DISCONTINUED | OUTPATIENT
Start: 2024-07-12 | End: 2024-07-13

## 2024-07-12 RX ORDER — NALOXONE HYDROCHLORIDE 0.4 MG/ML
0.2 INJECTION, SOLUTION INTRAMUSCULAR; INTRAVENOUS; SUBCUTANEOUS
Status: DISCONTINUED | OUTPATIENT
Start: 2024-07-12 | End: 2024-07-16 | Stop reason: HOSPADM

## 2024-07-12 RX ORDER — ONDANSETRON 4 MG/1
4 TABLET, ORALLY DISINTEGRATING ORAL EVERY 6 HOURS PRN
Status: DISCONTINUED | OUTPATIENT
Start: 2024-07-12 | End: 2024-07-13

## 2024-07-12 RX ORDER — AMOXICILLIN 250 MG
1 CAPSULE ORAL 2 TIMES DAILY PRN
Status: DISCONTINUED | OUTPATIENT
Start: 2024-07-12 | End: 2024-07-16 | Stop reason: HOSPADM

## 2024-07-12 RX ORDER — CALCIUM CARBONATE 500 MG/1
1000 TABLET, CHEWABLE ORAL 4 TIMES DAILY PRN
Status: DISCONTINUED | OUTPATIENT
Start: 2024-07-12 | End: 2024-07-16 | Stop reason: HOSPADM

## 2024-07-12 RX ORDER — ONDANSETRON 2 MG/ML
4 INJECTION INTRAMUSCULAR; INTRAVENOUS EVERY 6 HOURS PRN
Status: DISCONTINUED | OUTPATIENT
Start: 2024-07-12 | End: 2024-07-13

## 2024-07-12 RX ORDER — ONDANSETRON 2 MG/ML
4 INJECTION INTRAMUSCULAR; INTRAVENOUS ONCE
Status: COMPLETED | OUTPATIENT
Start: 2024-07-12 | End: 2024-07-12

## 2024-07-12 RX ORDER — SODIUM CHLORIDE 9 MG/ML
INJECTION, SOLUTION INTRAVENOUS CONTINUOUS
Status: ACTIVE | OUTPATIENT
Start: 2024-07-12 | End: 2024-07-13

## 2024-07-12 RX ORDER — CYCLOSPORINE 25 MG/1
50 CAPSULE, LIQUID FILLED ORAL EVERY EVENING
Status: DISCONTINUED | OUTPATIENT
Start: 2024-07-12 | End: 2024-07-16 | Stop reason: HOSPADM

## 2024-07-12 RX ADMIN — KETAMINE HYDROCHLORIDE 10 MG/HR: 10 INJECTION INTRAMUSCULAR; INTRAVENOUS at 18:02

## 2024-07-12 RX ADMIN — CEFTRIAXONE 2 G: 2 INJECTION, POWDER, FOR SOLUTION INTRAMUSCULAR; INTRAVENOUS at 20:08

## 2024-07-12 RX ADMIN — SODIUM CHLORIDE: 9 INJECTION, SOLUTION INTRAVENOUS at 16:45

## 2024-07-12 RX ADMIN — ROSUVASTATIN CALCIUM 5 MG: 5 TABLET, COATED ORAL at 20:55

## 2024-07-12 RX ADMIN — HYDROMORPHONE HYDROCHLORIDE 0.5 MG: 1 INJECTION, SOLUTION INTRAMUSCULAR; INTRAVENOUS; SUBCUTANEOUS at 13:20

## 2024-07-12 RX ADMIN — ONDANSETRON 4 MG: 2 INJECTION INTRAMUSCULAR; INTRAVENOUS at 18:30

## 2024-07-12 RX ADMIN — HYDROMORPHONE HYDROCHLORIDE 0.5 MG: 1 INJECTION, SOLUTION INTRAMUSCULAR; INTRAVENOUS; SUBCUTANEOUS at 13:19

## 2024-07-12 RX ADMIN — FAMOTIDINE 20 MG: 20 TABLET ORAL at 20:09

## 2024-07-12 RX ADMIN — CYCLOSPORINE 75 MG: 25 CAPSULE, LIQUID FILLED ORAL at 12:22

## 2024-07-12 RX ADMIN — SODIUM CHLORIDE 1000 ML: 9 INJECTION, SOLUTION INTRAVENOUS at 12:22

## 2024-07-12 RX ADMIN — HYDROMORPHONE HYDROCHLORIDE 0.5 MG: 1 INJECTION, SOLUTION INTRAMUSCULAR; INTRAVENOUS; SUBCUTANEOUS at 12:26

## 2024-07-12 RX ADMIN — ONDANSETRON 4 MG: 2 INJECTION INTRAMUSCULAR; INTRAVENOUS at 15:27

## 2024-07-12 RX ADMIN — SENNOSIDES AND DOCUSATE SODIUM 1 TABLET: 50; 8.6 TABLET ORAL at 20:09

## 2024-07-12 RX ADMIN — CYCLOSPORINE 50 MG: 50 CAPSULE, LIQUID FILLED ORAL at 20:55

## 2024-07-12 RX ADMIN — VANCOMYCIN HYDROCHLORIDE 2000 MG: 1 INJECTION, POWDER, LYOPHILIZED, FOR SOLUTION INTRAVENOUS at 20:55

## 2024-07-12 ASSESSMENT — ACTIVITIES OF DAILY LIVING (ADL)
ADLS_ACUITY_SCORE: 38
ADLS_ACUITY_SCORE: 39
ADLS_ACUITY_SCORE: 38
ADLS_ACUITY_SCORE: 38
ADLS_ACUITY_SCORE: 39
ADLS_ACUITY_SCORE: 38

## 2024-07-12 ASSESSMENT — COLUMBIA-SUICIDE SEVERITY RATING SCALE - C-SSRS
6. HAVE YOU EVER DONE ANYTHING, STARTED TO DO ANYTHING, OR PREPARED TO DO ANYTHING TO END YOUR LIFE?: NO
2. HAVE YOU ACTUALLY HAD ANY THOUGHTS OF KILLING YOURSELF IN THE PAST MONTH?: NO
1. IN THE PAST MONTH, HAVE YOU WISHED YOU WERE DEAD OR WISHED YOU COULD GO TO SLEEP AND NOT WAKE UP?: NO

## 2024-07-12 NOTE — H&P
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 Labs notable for leukocytosis of 14.7 with left shift. . SED 83. Right knee XR showed a moderate effusion with no evidence of malalignment, hardware loosening, or acute fracture.    #1 Right knee pain, concern for prosthetic knee infection  Orthopedic surgery consulted. Right knee aspirated with cloudy aspirate. Cell count with differentia and aerobic/anaerobic cultures obtained. Ortho plans to take to the OR pending synovial fluid studies  Pt NPO   PT/OT  Infectious disease consulted who recommended initiating monotherapy with ceftriaxone    #2 NAFLD/ANGULO S/P Liver Transplantation   #3 Immunosuppression   #4 Thrombocytpenia   Hx of rejection treated with high dose steroids  Continue PTA monotherapy with Cyclosporine   Continue outpatient follow as indicated per GI  Platelets 99 (baseline 150)  Follow daily labs including CBC and CMP      #5 CKD III  Creatinine 1.13, at baseline    #6 Hypothyroidism  Most recent TSH 0.56 on 04/24/24  Continue PTA levothyroxine    #7 Hypertension  BP stable. 130/80s  Not on any antihypertensives    #8 Severe Obesity: Estimated body mass index is 40.97 kg/m  as calculated from the following:  Height as of this encounter:  "1.575 m (5' 2\").  Weight as of this encounter: 101.6 kg (224 lb).          Diet: NPO per Anesthesia Guidelines for Procedure/Surgery Except for: Meds    DVT Prophylaxis: Pneumatic Compression Devices  Forrester Catheter: Not present  Lines: None     Cardiac Monitoring: None  Code Status:  FULL CODE    Clinically Significant Risk Factors Present on Admission          Disposition Plan     Medically Ready for Discharge: Anticipated in 2-4 Days         The patient's care was discussed with the Attending Physician, Dr. Fab White.    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     Pt reports developing nausea/vomiting/diarrhea 4 days ago (last Tueday) followed by low grade fever of 99 degrees farenheit  with \"violent chills\" She state symptoms cleared up the next day, however, on Wednesday evening she developed 10/10 non radiating right knee pain. Throughout the past two days, pain has worsened to the point where she is now unable to bear any weight. She " "has noticed associated redness and swelling of the knee with progression down her lower leg.  She reports difficulty with ROM, stating it feels very \"tight to bend the knee\" She is ambulatory and independent at baseline. She has hx of bilateral knee replacements. 2 months following her left knee replacement, she did develop a soft tissue infection. She lives remotely and is outside occasionally however denies any known tick exposures or bug bites. She denies any recent falls or trauma. She last had dental cleaning a month ago and premedicated with amoxicillin. No recent illness, travel, or sick contacts.     Upon presentation to the ED, pt was vitally stable and afebrile. Right knee was exquisitely painful with ROM. It was erythematous and swollen.  Labs notable for leukocytosis of 14.7 with left shift. . SED 83. Right knee XR showed a moderate effusion with no evidence of malalignment, hardware loosening, or acute fracture.  Ortho consulted who performed joint aspiration with plans to proceed to OR pending synovial fluid studies.       Past Medical History    Past Medical History:   Diagnosis Date    Anemia     Cellulitis     Cirrhosis of liver (H) 2018    cirrhosis secondary to combination of nonalcoholic fatty liver disease, iron overload, and alpha-1 antitrypsin MZ phenotype who is s/p  donor liver transplant on 19    Gastroesophageal reflux disease     Heterozygous alpha 1-antitrypsin deficiency (H)     High hepatic iron concentration determined by biopsy of liver     Incisional infection 2021    Left TKA incision    Liver cirrhosis secondary to ANGULO (H)     Liver transplanted (H) 2019    Lymphedema     Osteoarthritis     knees    Other chronic pain     Left knee    SBP (spontaneous bacterial peritonitis) (H) 2019 in [place]    Thrombocytopenia (H24) 2020    Thyroid disease     Hypothyroid       Past Surgical History   Past Surgical History:   Procedure " Laterality Date    ARTHROPLASTY KNEE Right 10/23/2020    Procedure: Right  total knee arthroplasty;  Surgeon: Mario Wallace MD;  Location: [facility] OR    ARTHROPLASTY KNEE Left 09/24/2021    Procedure: Left total knee arthroplasty;  Surgeon: Mario Wallace MD;  Location: [facility]    BENCH LIVER N/A 08/18/2019    Procedure: BACKBENCH PREPARATION, LIVER;  Surgeon: Mandeep Alford MD;  Location: [facility] OR    BIOPSY  Bone marrow 2018    Liver ~ 1980; 2021    CHOLECYSTECTOMY  8/18/2019    Removed during liver transplant    COLONOSCOPY N/A 06/22/2018    Procedure: COLONOSCOPY;  colonoscopy;  Surgeon: Hugo Patel MD;  [facility]    ENDOSCOPIC RETROGRADE CHOLANGIOPANCREATOGRAM N/A 02/10/2020    Procedure: ENDOSCOPIC RETROGRADE CHOLANGIOPANCREATOGRAPHY with spinchterotomy;  Surgeon: Guru Rigoberto Canada MD;  [facility]    ENDOSCOPIC RETROGRADE CHOLANGIOPANCREATOGRAM N/A 05/13/2020    Procedure: ENDOSCOPIC RETROGRADE CHOLANGIOPANCREATOGRAPHY,Stent exchange and sludge removal;  Surgeon: Guru Rigoberto Canada MD;  [facility]    ENDOSCOPIC RETROGRADE CHOLANGIOPANCREATOGRAM N/A 02/24/2021    Procedure: ENDOSCOPIC RETROGRADE CHOLANGIOPANCREATOGRAPHY, SPINCTEROTOMY, DEBRIDE REMOVAL, STENT PLACEMENT;  Surgeon: Guru Rigoberto Canada MD;  [facility]    ENDOSCOPIC RETROGRADE CHOLANGIOPANCREATOGRAPHY, EXCHANGE TUBE/STENT N/A 03/04/2020    Procedure: ENDOSCOPIC RETROGRADE CHOLANGIOPANCREATOGRAPHY, WITH biliary dilarion, stent exchange, stone removal;  Surgeon: Guru Rigoberto Canada MD;  [facility]    ENDOSCOPIC RETROGRADE CHOLANGIOPANCREATOGRAPHY, EXCHANGE TUBE/STENT N/A 05/12/2021    Procedure: ENDOSCOPIC RETROGRADE CHOLANGIOPANCREATOGRAPHY WITH BILIARY STENT EXCHANGE, DILATION AND SLUDGE/STONE REMOVAL;  Surgeon: Guru Rigoberto Canada MD;  [facility]    ESOPHAGOSCOPY, GASTROSCOPY, DUODENOSCOPY (EGD), COMBINED N/A 10/31/2019     Procedure: Esophagogastroduodenoscopy, With Biopsy;  Surgeon: Nerissa Aranda MD;  Location: [facility]    IR FEEDING TUBE PLACEMENT W MOHAN/MD  2019    IR FLUORO 0-1 HOUR  2019    IR LIVER BIOPSY PERCUTANEOUS  2021    IR LUMBAR PUNCTURE  2019    IRRIGATION AND DEBRIDEMENT LOWER EXTREMITY, COMBINED Left 11/10/2021    Procedure: Irrigation and debridement Left knee skin subcutaneous tissue (length: 10cm), Application of wound vac;  Surgeon: Mario Wallace MD;  Location: [facility]    KNEE SURGERY  10/23/2020    AR STOMACH SURGERY PROCEDURE UNLISTED  Liver transplant 19    TRANSPLANT LIVER RECIPIENT  DONOR N/A 2019    Procedure: TRANSPLANT, LIVER, RECIPIENT,  DONOR;  Surgeon: Mandeep Alford MD;  [facility]: [facility]    US PARACENTESIS  2019    US PARACENTESIS WITH ALBUMIN  2019       Prior to Admission Medications   Prior to Admission Medications   Prescriptions Last Dose Informant Patient Reported? Taking?   COMPRESSION STOCKINGS Past Week at unknown Self No Yes   Si each every 12 hours   Cholecalciferol (VITAMIN D-3) 25 MCG (1000 UT) CAPS 2024 at am Self No Yes   Sig: Take 1,000 Units by mouth 2 times daily   Patient taking differently: Take 1,000 Units by mouth daily   acetaminophen (TYLENOL) 325 MG tablet 2024 at am Self No Yes   Sig: Take 2 tablets (650 mg) by mouth every 4 hours as needed for other   amoxicillin (AMOXIL) 500 MG capsule More than a month at unknown Self Yes Yes   Sig: Take 2,000 mg by mouth once Prior to dental appointments   cycloSPORINE modified (GENERIC EQUIVALENT) 25 MG capsule 2024 at am Self No Yes   Sig: Take 3 capsules (75 mg) by mouth every morning AND 2 capsules (50 mg) every evening.   famotidine (PEPCID) 20 MG tablet 2024 at am Self No Yes   Sig: Take 1 tablet (20 mg) by mouth 2 times daily   levothyroxine (SYNTHROID/LEVOTHROID) 125 MCG tablet 2024 at am Self Yes Yes   Sig: Take  112.5 mcg by mouth every morning   rosuvastatin (CRESTOR) 5 MG tablet 7/11/2024 at pm Self No Yes   Sig: Take 1 tablet (5 mg) by mouth daily      Facility-Administered Medications: None        Review of Systems    The 10 point Review of Systems is negative other than noted in the HPI or here.    Physical Exam   Vital Signs: Temp: 97.9  F (36.6  C) Temp src: Oral BP: 103/67 Pulse: 87   Resp: 18 SpO2: 97 % O2 Device: None (Room air)    Weight: 224 lbs 0 oz    Constitutional: awake, alert, cooperative, no apparent distress, and appears stated age  Eyes: pupils equal, round and reactive to light  ENT: dry mucosa  Hematologic / Lymphatic: no cervical lymphadenopathy  Respiratory: No increased work of breathing, good air exchange, clear to auscultation bilaterally, no crackles or wheezing  Cardiovascular: Normal apical impulse, regular rate and rhythm, normal S1 and S2, no S3 or S4, and no murmur noted  GI: No scars, normal bowel sounds, soft, non-distended, non-tender, no masses palpated, no hepatosplenomegally  Genitounirinary: External catheter placed  Skin: no bruising or bleeding and normal skin color, texture, turgor  Musculoskeletal: Well healed bilateral Knee replacement scars.  Right knee swollen, erythematous, and warm with extension of erythema to mid lower leg. No defied borders to the redness. Exquisitely painful with light touch. ROM not attempted. Distal pulse , sensory, and motor function intact. +1 bilateral non pitting edema. Capillary refill not assessed due to nail thickening.  Neurologic: Awake, alert, oriented to name, place and time.  Cranial nerves II-XII are grossly intact.  Motor is 5 out of 5 bilaterally.  Cerebellar finger to nose, heel to shin intact.  Sensory is intact.  Babinski down going, Romberg negative, and gait is normal.  Neuropsychiatric: General: normal, calm, and normal eye contact  Level of consciousness: alert / normal    Medical Decision Making       75 MINUTES SPENT BY ME on  the date of service doing chart review, history, exam, documentation & further activities per the note.      Data

## 2024-07-12 NOTE — PHARMACY
"Provider Notification     Paged medicine VEENA Eldon Bright and messaged ID provider Dr Alfonso regarding patient's medication allergies.    Spoke with patient to clarify medication allergies listed as several were listed as \"unknown reaction\" :  Dizziness, nausea, and lightheadedness: ciprofloxacin, linezolid, cefpodoxime (was on these after knee infection in 2021)   She is unclear what her reaction was to Bactrim     Updated allergy list in Epic with specific reactions.     Please contact floor pharmacist with further questions regarding medications.     Bianka Tovar, PharmD   Clinical Pharmacist, [facility]   "

## 2024-07-12 NOTE — ED PROVIDER NOTES
[facility] EMERGENCY DEPARTMENT ([facility])    7/12/24      ED PROVIDER NOTE   ED 20 9:43 AM   History     Chief Complaint   Patient presents with    Knee Pain     States no new injury.  Had knee surgery in 2020.  States this Tuesday was sick with fever and flu like symptoms.  States all those symptoms went away but now she can not walk.  Her ortho team is here @ the [facility] and told her to come in.  Dr Doug Wallace is her surgeon.  Team states he is here today.  Pt states she is a liver transplant in 2019     The history is provided by the patient and medical records.     Nicci Pemberton is a 64 year old female with history of liver transplant 2019 on anti rejection medications, right total knee replacement 10/23/2020, left total knee replacement 9/24/2021 complicated by infection with 6 different organisms s/p antibiotics, heterozygous alpha 1-antitrypsin deficiency who presents emergency department with severe right knee pain.  Symptoms had started on 7/9/2024, 3 days ago when she went to bed.  She had chills, teeth chattering rigors, diffuse body aches, felt like she had the flu.  She felt generally weak and vomited twice overnight.  She had to walk on her knees to get to the bathroom due to feeling so unwell.  These flu symptoms subsided by the following morning; no more chills, shaking rigors, nausea or vomiting.  However she did have 1 episode of diarrhea in the morning and followed by focal right knee pain and swelling.  The right knee pain has been progressively worsening to the point where it is severe and she is unable to bear weight on it at all. Yesterday has been noticed some redness on right shin just below her knee, felt a little warm.  Orthopedics clinic was called, they tried to work her in their schedule but the soonest they could get her in was Monday so they encouraged her to come to the ED for more emergent evaluation.  She continues to have severe right knee pain at this time, the pain  is improved with rest.  No high fevers, had temperatures of 99  F at home. No issues on left knee at all. Vomited only Tuesday night, diarrhea Wednesday morning 1x nothing since then. No cough, sore throat, or any flu like symptoms at this time.     Past Medical History  Past Medical History:   Diagnosis Date    Anemia     Cellulitis     Cirrhosis of liver (H) 2018    cirrhosis secondary to combination of nonalcoholic fatty liver disease, iron overload, and alpha-1 antitrypsin MZ phenotype who is s/p  donor liver transplant on 19    Gastroesophageal reflux disease     Heterozygous alpha 1-antitrypsin deficiency (H)     High hepatic iron concentration determined by biopsy of liver     Incisional infection 2021    Left TKA incision    Liver cirrhosis secondary to ANGULO (H)     Liver transplanted (H) 2019    Lymphedema     Osteoarthritis     knees    Other chronic pain     Left knee    SBP (spontaneous bacterial peritonitis) (H) 2019 in [place]    Thrombocytopenia (H24) 2020    Thyroid disease     Hypothyroid     Past Surgical History:   Procedure Laterality Date    ARTHROPLASTY KNEE Right 10/23/2020    Procedure: Right  total knee arthroplasty;  Surgeon: Mario Wallace MD;  Location: [facility] OR    ARTHROPLASTY KNEE Left 2021    Procedure: Left total knee arthroplasty;  Surgeon: Mario Wallace MD;  Location: [facility] OR    BENCH LIVER N/A 2019    Procedure: BACKBENCH PREPARATION, LIVER;  Surgeon: Mandeep Alford MD;  [facility]: [facility] OR    BIOPSY  Bone marrow 2018    Liver ~ ;     CHOLECYSTECTOMY  2019    Removed during liver transplant    COLONOSCOPY N/A 2018    Procedure: COLONOSCOPY;  colonoscopy;  Surgeon: Hugo Patel MD;  [facility]    ENDOSCOPIC RETROGRADE CHOLANGIOPANCREATOGRAM N/A 02/10/2020    Procedure: ENDOSCOPIC RETROGRADE CHOLANGIOPANCREATOGRAPHY with spinchterotomy;  Surgeon: Guru Rigoberto Canada MD;   [facility]    ENDOSCOPIC RETROGRADE CHOLANGIOPANCREATOGRAM N/A 2020    Procedure: ENDOSCOPIC RETROGRADE CHOLANGIOPANCREATOGRAPHY,Stent exchange and sludge removal;  Surgeon: Guru Rigoberto Canada MD;  [facility]    ENDOSCOPIC RETROGRADE CHOLANGIOPANCREATOGRAM N/A 2021    Procedure: ENDOSCOPIC RETROGRADE CHOLANGIOPANCREATOGRAPHY, SPINCTEROTOMY, DEBRIDE REMOVAL, STENT PLACEMENT;  Surgeon: Guru Rigoberto Canada MD;  [facility]    ENDOSCOPIC RETROGRADE CHOLANGIOPANCREATOGRAPHY, EXCHANGE TUBE/STENT N/A 2020    Procedure: ENDOSCOPIC RETROGRADE CHOLANGIOPANCREATOGRAPHY, WITH biliary dilarion, stent exchange, stone removal;  Surgeon: Guru Rigoberto Canada MD;  [facility]    ENDOSCOPIC RETROGRADE CHOLANGIOPANCREATOGRAPHY, EXCHANGE TUBE/STENT N/A 2021    Procedure: ENDOSCOPIC RETROGRADE CHOLANGIOPANCREATOGRAPHY WITH BILIARY STENT EXCHANGE, DILATION AND SLUDGE/STONE REMOVAL;  Surgeon: Guru Rigoberto Canada MD;  [facility]    ESOPHAGOSCOPY, GASTROSCOPY, DUODENOSCOPY (EGD), COMBINED N/A 10/31/2019    Procedure: Esophagogastroduodenoscopy, With Biopsy;  Surgeon: Nerissa Aranda MD;  [facility]: [facility]    IR FEEDING TUBE PLACEMENT W MOHAN/MD  2019    IR FLUORO 0-1 HOUR  2019    IR LIVER BIOPSY PERCUTANEOUS  2021    IR LUMBAR PUNCTURE  2019    IRRIGATION AND DEBRIDEMENT LOWER EXTREMITY, COMBINED Left 11/10/2021    Procedure: Irrigation and debridement Left knee skin subcutaneous tissue (length: 10cm), Application of wound vac;  Surgeon: Mario Wallace MD;  Location: [facility]    KNEE SURGERY  10/23/2020    ND STOMACH SURGERY PROCEDURE UNLISTED  Liver transplant 19    TRANSPLANT LIVER RECIPIENT  DONOR N/A 2019    Procedure: TRANSPLANT, LIVER, RECIPIENT,  DONOR;  Surgeon: Mandeep Alford MD;  [facility]: [facility]    US PARACENTESIS  2019    US PARACENTESIS  WITH ALBUMIN  2019     acetaminophen (TYLENOL) 325 MG tablet  Cholecalciferol (VITAMIN D-3) 25 MCG (1000 UT) CAPS  COMPRESSION STOCKINGS  cycloSPORINE modified (GENERIC EQUIVALENT) 25 MG capsule  famotidine (PEPCID) 20 MG tablet  levothyroxine (SYNTHROID/LEVOTHROID) 125 MCG tablet  rosuvastatin (CRESTOR) 5 MG tablet      Allergies   Allergen Reactions    Ciprofloxacin Dizziness and Nausea     Other reaction(s): Dizziness    Furosemide Rash and Swelling     Other reaction(s): rash  14  Other reaction(s): rash  14    Cefpodoxime Other (See Comments)    Food      Fruits with cores and pits  Apples    Linezolid Other (See Comments)    Sulfa Antibiotics Other (See Comments) and Unknown     Swollen joints    Sulfamethoxazole-Trimethoprim      Other reaction(s): Unknown  Other reaction(s): Unknown     Family History  Family History   Problem Relation Age of Onset    Diabetes Maternal Grandfather         Diagnosed at age 83    Mental Illness Maternal Grandfather         Alzheimers    Hypertension Mother     Osteoporosis Mother     Restless Leg Syndrome Father     Mental Illness Father         Arnie Body Disease    Mental Illness Sister         Borderline Schizophrenia    Alcoholism Brother     Diabetes Brother         Diagnosed at age 63    Hyperlipidemia Brother     Obesity Brother     No Known Problems Son     Heart Failure Paternal Grandmother     No Known Problems Son     No Known Problems Maternal Grandmother     Breast Cancer Paternal Aunt     Breast Cancer Other         Maternal Aunt (diagnosed age 50+)    Breast Cancer Other         Paternal Aunt (diagnosed age 50+)    Cirrhosis No family hx of     Liver Cancer No family hx of      Social History   Social History     Tobacco Use    Smoking status: Former     Current packs/day: 0.00     Average packs/day: 0.5 packs/day for 11.4 years (5.7 ttl pk-yrs)     Types: Cigarettes     Start date: 2000     Quit date: 10/3/2011     Years since quittin.7  "   Smokeless tobacco: Never   Substance Use Topics    Alcohol use: Not Currently     Comment: Former weekend social drinker;  no use since 2015    Drug use: Not Currently     Types: Marijuana     Comment: Teen years      A medically appropriate review of systems was performed with pertinent positives and negatives noted in the HPI, and all other systems negative.    Physical Exam   BP: 103/67  Pulse: 87  Temp: 97.9  F (36.6  C)  Resp: 18  Height: 157.5 cm (5' 2\")  Weight: 101.6 kg (224 lb)  SpO2: 97 %    Physical Exam  Vitals and nursing note reviewed.   Constitutional:       General: She is not in acute distress.     Appearance: Normal appearance. She is well-developed.   HENT:      Head: Normocephalic and atraumatic.   Eyes:      General: No scleral icterus.     Conjunctiva/sclera: Conjunctivae normal.   Cardiovascular:      Rate and Rhythm: Normal rate.   Pulmonary:      Effort: Pulmonary effort is normal. No respiratory distress.   Abdominal:      General: Abdomen is flat.   Musculoskeletal:         General: Swelling and tenderness present.      Cervical back: Normal range of motion and neck supple.   Skin:     General: Skin is warm and dry.      Findings: No rash.   Neurological:      Mental Status: She is alert and oriented to person, place, and time.             ED Course, Procedures, & Data     ED Course as of 07/27/24 1520   Fri Jul 12, 2024   1303 Ortho did knee tap and moved a large syringe of turbid, cloudy yellow fluid concerning for infection. Plan to admit to Medicine, plan for NPO and possible OR washout.    1340 Case discussed with Pharmacy, who had recommended Ancef.      Procedures          No results found for any visits on 07/12/24.  Medications - No data to display  Labs Ordered and Resulted from Time of ED Arrival to Time of ED Departure - No data to display  No orders to display          Critical care was not performed.     Medical Decision Making  The patient's presentation was of high " complexity (an acute health issue posing potential threat to life or bodily function).    The patient's evaluation involved:  ordering and/or review of 3+ test(s) in this encounter (see separate area of note for details)  discussion of management or test interpretation with another health professional (orthopedic surgery)    The patient's management necessitated high risk (a decision regarding hospitalization).    Assessment & Plan      64 year old female with history of liver transplant 2019 on anti rejection medications, right total knee replacement 10/23/2020, left total knee replacement 9/24/2021 complicated by infection with 6 different organisms s/p antibiotics, heterozygous alpha 1-antitrypsin deficiency who presents emergency department with severe right knee pain, signs and symptoms concerning for septic arthritis.  Vital signs stable and afebrile including normal pulse ox at 97% on room air.  IV established, labs drawn and sent reviewed and documented.  Notable for inflammatory marker elevation with a CRP of 297 and ESR 83, leukocytosis with white count of 14.7, mild GAMAL with a BUN/creatinine of 30/1.13.  X-ray of the right knee obtained which revealed large effusion.  Right lower extremity ultrasound revealed no evidence of DVT.  Case discussed with surgery, please refer to the note for further details.  Patient given Dilaudid IV as needed with adequate control of her pain.          I have reviewed the nursing notes. I have reviewed the findings, diagnosis, plan and need for follow up with the patient.    New Prescriptions    No medications on file       Final diagnoses:   Prosthetic joint infection (H24)   Pain and swelling of right knee   Septic arthritis of knee, right (H)   Liver transplant rejection (H)   Pyogenic arthritis of right knee joint, due to unspecified organism (H)   S/P debridement - I & D and poly exchange right knee   Chronic pain of left knee   Septic arthritis (H)   Disha BRAN  Emily, am serving as a trained medical scribe to document services personally performed by Surya Mcgowan MD based on the provider's statements to me on July 12, 2024.  This document has been checked and approved by the attending provider.    I, Surya Mcgowan MD, was physically present and have reviewed and verified the accuracy of this note documented by Disha Diaz, medical scribe.      Surya Mcgowan MD     [facility] EMERGENCY DEPARTMENT  7/12/2024              Surya Mcgowan MD  07/27/24 1526

## 2024-07-12 NOTE — MEDICATION SCRIBE - ADMISSION MEDICATION HISTORY
Medication Scribe Admission Medication History    Admission medication history is complete. The information provided in this note is only as accurate as the sources available at the time of the update.    Information Source(s): Patient, Hospital records, and CareEverywhere/SureScripts via in-person    Pertinent Information: None.    Changes made to PTA medication list:  Added: amoxicillin 2000 mg once prior to dental appointments.  Deleted: None  Changed: None    Allergies reviewed with patient and updates made in EHR: yes    Medication History Completed By: Ruiz Gordillo MD 7/12/2024 11:22 AM    PTA Med List   Medication Sig Last Dose    acetaminophen (TYLENOL) 325 MG tablet Take 2 tablets (650 mg) by mouth every 4 hours as needed for other 7/12/2024 at am    amoxicillin (AMOXIL) 500 MG capsule Take 2,000 mg by mouth once Prior to dental appointments More than a month at unknown    Cholecalciferol (VITAMIN D-3) 25 MCG (1000 UT) CAPS Take 1,000 Units by mouth 2 times daily (Patient taking differently: Take 1,000 Units by mouth daily) 7/11/2024 at am    COMPRESSION STOCKINGS 2 each every 12 hours Past Week at unknown    cycloSPORINE modified (GENERIC EQUIVALENT) 25 MG capsule Take 3 capsules (75 mg) by mouth every morning AND 2 capsules (50 mg) every evening. 7/11/2024 at am    famotidine (PEPCID) 20 MG tablet Take 1 tablet (20 mg) by mouth 2 times daily 7/11/2024 at am    levothyroxine (SYNTHROID/LEVOTHROID) 125 MCG tablet Take 112.5 mcg by mouth every morning 7/11/2024 at am    rosuvastatin (CRESTOR) 5 MG tablet Take 1 tablet (5 mg) by mouth daily 7/11/2024 at pm

## 2024-07-12 NOTE — PROGRESS NOTES
"  VS: /69 (BP Location: Right arm, Patient Position: Supine, Cuff Size: Adult Large)   Pulse 74   Temp 98.1  F (36.7  C) (Oral)   Resp 18   Ht 1.575 m (5' 2\")   Wt 101.6 kg (224 lb)   SpO2 98%   BMI 40.97 kg/m      O2: RA   Output: Purewick in place   Last BM: 7/11   Activity: 2 assist w/ GB & walker when OOB, but not doing much walking due to pain in R knee   Skin: Intact, old scars on bilateral knees   Pain: Pain when ambulating in R knee   CMS: Intact denies numbness or tingling   Dressing: none   Diet: Reg diet, NPO at midnight   LDA: PIV in LAC and new PIV just placed for IV antibiotics   Equipment: GB, Walker   Plan: OR at 0745 for I&D on R knee tomorrow.  NPO at midnight   Additional Info:       "

## 2024-07-12 NOTE — LETTER
Shari Montiel RN  [facility] Clinical Coordinator  [phone]    Discharge Orders 7/16/24

## 2024-07-12 NOTE — CONSULTS
Orthopaedic Surgery Consultation    DATE OF SERVICE:  7/12/2024    This is a consultation requested by ER for concern for septic joint.    CHIEF COMPLAINT:  right knee pain    HISTORY OBTAINED FROM: patient, spouse    HISTORY:  This is a 64 year old old female with PMH history of liver transplant 2019 on anti rejection medications, right total knee replacement 10/23/2020, left total knee replacement 9/24/2021 complicated by infection s/p antibiotics, heterozygous alpha 1-antitrypsin deficiency  who presents with severe right knee pain. Reports insidious onset of pain that began approximately 2 days ago.  Her initial symptoms that started 2 days ago included malaise, low-grade temperatures, nausea and diarrhea.  Endorses swelling and warmth to the knee and the leg distal to the knee.  Pain came on insidiously and progressively without any preceding trauma, twisting or falls.  Increasing pain with motion of the knee and bearing weight.  Prior to 2 days ago, her knee was functioning appropriately, did not give her any significant problems.  Denies numbness, paresthesias, pain anywhere else in the body.  No other painful or swollen joints, denies back pain. Denies dental sores, wounds, painful, teeth, or recent dental procedures.  Denies any wounds, open sores, lesions, shortness of breath, cough, pain with urination.     Patient has been NPO since noon for water and 8am for non-water (breakfast)    Relevant surgical history:   10/23/2020 right total knee replacement  9/24/2021, Left TKA ([facility]) [facility]  11/10/2021, L knee incision I and D ([facility]) [facility].Enterococcus faecalis, Citrobacter and strep mitis, Strep Oralis, peptoniphilus asaccharolyticus, staphylococcus epidermidis. Keflex x 2 days switched to: Linezolid 600 mg p.o. twice daily x10 days, Vantin 200 mg p.o. twice daily x10 days. Then Vantin 200mg PO BID every day x 10days, linezolid 600mg PO BID daily x 10 days.    Implants for right TKA:   Dilma / Biomet /  Vanguard  71mm Tibia  67.5 CR Femur  31x8 Patella  12x71 Insert AS    PAST MEDICAL HISTORY:    Past Medical History:   Diagnosis Date    Anemia     Cellulitis     Cirrhosis of liver (H) 2018    cirrhosis secondary to combination of nonalcoholic fatty liver disease, iron overload, and alpha-1 antitrypsin MZ phenotype who is s/p  donor liver transplant on 19    Gastroesophageal reflux disease     Heterozygous alpha 1-antitrypsin deficiency (H)     High hepatic iron concentration determined by biopsy of liver     Incisional infection 2021    Left TKA incision    Liver cirrhosis secondary to ANGULO (H)     Liver transplanted (H) 2019    Lymphedema     Osteoarthritis     knees    Other chronic pain     Left knee    SBP (spontaneous bacterial peritonitis) (H) 2019 in [facility]    Thrombocytopenia (H24) 2020    Thyroid disease     Hypothyroid       PAST SURGICAL HISTORY:    Past Surgical History:   Procedure Laterality Date    ARTHROPLASTY KNEE Right 10/23/2020    Procedure: Right  total knee arthroplasty;  Surgeon: Mario Wallace MD;  [facility]    ARTHROPLASTY KNEE Left 2021    Procedure: Left total knee arthroplasty;  Surgeon: Mario Wallace MD;  [facility]    BENCH LIVER N/A 2019    Procedure: BACKBENCH PREPARATION, LIVER;  Surgeon: Mandeep Alford MD;  [facility]    BIOPSY  Bone marrow 2018    Liver ~ 2021    CHOLECYSTECTOMY  2019    Removed during liver transplant    COLONOSCOPY N/A 2018    Procedure: COLONOSCOPY;  colonoscopy;  Surgeon: Hugo Patel MD;  [facility]    ENDOSCOPIC RETROGRADE CHOLANGIOPANCREATOGRAM N/A 02/10/2020    Procedure: ENDOSCOPIC RETROGRADE CHOLANGIOPANCREATOGRAPHY with spinchterotomy;  Surgeon: Guru Rigoberto Canada MD;  [facility]    ENDOSCOPIC RETROGRADE CHOLANGIOPANCREATOGRAM N/A 2020    Procedure: ENDOSCOPIC RETROGRADE CHOLANGIOPANCREATOGRAPHY,Stent exchange  and sludge removal;  Surgeon: Guru Rigoberto Canada MD;  [facility]    ENDOSCOPIC RETROGRADE CHOLANGIOPANCREATOGRAM N/A 2021    Procedure: ENDOSCOPIC RETROGRADE CHOLANGIOPANCREATOGRAPHY, SPINCTEROTOMY, DEBRIDE REMOVAL, STENT PLACEMENT;  Surgeon: Guru Rigoberto Canada MD;  [facility]    ENDOSCOPIC RETROGRADE CHOLANGIOPANCREATOGRAPHY, EXCHANGE TUBE/STENT N/A 2020    Procedure: ENDOSCOPIC RETROGRADE CHOLANGIOPANCREATOGRAPHY, WITH biliary dilarion, stent exchange, stone removal;  Surgeon: Guru Rigoberto Canada MD;  [facility]    ENDOSCOPIC RETROGRADE CHOLANGIOPANCREATOGRAPHY, EXCHANGE TUBE/STENT N/A 2021    Procedure: ENDOSCOPIC RETROGRADE CHOLANGIOPANCREATOGRAPHY WITH BILIARY STENT EXCHANGE, DILATION AND SLUDGE/STONE REMOVAL;  Surgeon: Guru Rigoberto Canada MD;  [facility]    ESOPHAGOSCOPY, GASTROSCOPY, DUODENOSCOPY (EGD), COMBINED N/A 10/31/2019    Procedure: Esophagogastroduodenoscopy, With Biopsy;  Surgeon: Nerissa Aranda MD;  [facility]    IR FEEDING TUBE PLACEMENT W MOHAN/MD  2019    IR FLUORO 0-1 HOUR  2019    IR LIVER BIOPSY PERCUTANEOUS  2021    IR LUMBAR PUNCTURE  2019    IRRIGATION AND DEBRIDEMENT LOWER EXTREMITY, COMBINED Left 11/10/2021    Procedure: Irrigation and debridement Left knee skin subcutaneous tissue (length: 10cm), Application of wound vac;  Surgeon: Mario Wallace MD;  [facility]    KNEE SURGERY  10/23/2020    VT STOMACH SURGERY PROCEDURE UNLISTED  Liver transplant 19    TRANSPLANT LIVER RECIPIENT  DONOR N/A 2019    Procedure: TRANSPLANT, LIVER, RECIPIENT,  DONOR;  Surgeon: Mandeep Alford MD;  [facility]    US PARACENTESIS  2019    US PARACENTESIS WITH ALBUMIN  2019       FAMILY HISTORY:  Reviewed with patient and is non-contributory.   No personal or family history of bleeding or clotting  disorders  No personal or family history of adverse reactions to anesthesia     SOCIAL HISTORY:   Tobacco -non-smoker  Alcohol -denies    ALLERGIES:   Allergies   Allergen Reactions    Ciprofloxacin Dizziness and Nausea     Other reaction(s): Dizziness    Furosemide Rash and Swelling     Other reaction(s): rash  8/14/14  Other reaction(s): rash  8/14/14    Cefpodoxime Other (See Comments)    Food      Fruits with cores and pits  Apples    Linezolid Other (See Comments)    Sulfa Antibiotics Other (See Comments) and Unknown     Swollen joints    Sulfamethoxazole-Trimethoprim      Other reaction(s): Unknown  Other reaction(s): Unknown       MEDS:   Prior to Admission medications    Medication Sig Last Dose Taking? Auth Provider Long Term End Date   acetaminophen (TYLENOL) 325 MG tablet Take 2 tablets (650 mg) by mouth every 4 hours as needed for other 7/12/2024 at am Yes Beatriz Pearce APRN CNS     amoxicillin (AMOXIL) 500 MG capsule Take 2,000 mg by mouth once Prior to dental appointments More than a month at unknown Yes Reported, Patient No    Cholecalciferol (VITAMIN D-3) 25 MCG (1000 UT) CAPS Take 1,000 Units by mouth 2 times daily  Patient taking differently: Take 1,000 Units by mouth daily 7/11/2024 at am Yes Mandeep Alford MD     COMPRESSION STOCKINGS 2 each every 12 hours Past Week at unknown Yes Leventhal, Thomas Michael, MD     cycloSPORINE modified (GENERIC EQUIVALENT) 25 MG capsule Take 3 capsules (75 mg) by mouth every morning AND 2 capsules (50 mg) every evening. 7/11/2024 at am Yes Leventhal, Thomas Michael, MD Yes    famotidine (PEPCID) 20 MG tablet Take 1 tablet (20 mg) by mouth 2 times daily 7/11/2024 at am Yes Mandeep Alford MD     levothyroxine (SYNTHROID/LEVOTHROID) 125 MCG tablet Take 112.5 mcg by mouth every morning 7/11/2024 at am Yes Abstract, Provider Yes    rosuvastatin (CRESTOR) 5 MG tablet Take 1 tablet (5 mg) by mouth daily 7/11/2024 at pm Yes Leventhal, Thomas Michael,  "MD Yes          REVIEW OF SYSTEMS:  An 11-point systems review was performed and negative except as noted in the HPI.      PHYSICAL EXAMINATION:    Vitals:    07/12/24 0926   BP: 103/67   Pulse: 87   Resp: 18   Temp: 97.9  F (36.6  C)   TempSrc: Oral   SpO2: 97%   Weight: 101.6 kg (224 lb)   Height: 1.575 m (5' 2\")         Gen:  Awake and Alert, pleasant, interactive, no acute distress.    Psych:  Articulates and Communicates with a normal affect    HEENT:  Normal and atraumatic    Pulm:   Non-labored breathing at rest. Saturating well on RA.   CV:  RRR   Extremities:        RLE: Well-healed anterior midline knee incision without any surrounding erythema, no sinus tracts no active or expressible drainage, no overt evidence of infection at the incision itself.  There is a large knee effusion.  Severe pain with even gentle range of motion of the knee.  Prior tibialis anterior, gastrocnemius, EHL, FHL with good strength.  Sensation intact light touch in superficial and deep peroneal, sural, saphenous, tibial nerve distributions.  Full removal perfused with good capillary refill and palpable dorsalis pedis pulse.  There is a faint erythematous appearance, ill-defined without any significant induration, drainage on the lower leg, anterior shin.    LLE:   Fully healed, atrophic scar to the anterior midline knee, no surrounding erythema, no active or expressible drainage, no sinus tracts.  No evidence of infection.  Tolerates good range of motion to the knee without any pain.  Fires tibialis anterior, gastrocnemius, EHL, FHL, wiggles lesser toes.  Sensation tact light touch in superficial and deep peroneal, sural, saphenous, tibial nerve distributions.  Foot warm and well-perfused with good capillary refill and palpable dorsalis pedis pulse.      LABS/IMAGING:  Recent Labs   Lab Test 07/12/24  1003 05/29/24  1030 02/20/24  1028 11/28/23  1613   HGB 12.1 13.3 13.2 13.1   WBC 14.7* 5.2 4.5 6.0     Recent Labs   Lab Test " 07/12/24  1003 05/29/24  1030 05/06/24  1034 04/02/24  1027 03/12/24  1143 02/20/24  1028 08/27/20  0934 07/28/20  0942 06/25/20  0938 06/10/20  0941   CHLORIDE 98 106 108* 106   < > 105   < > 110* 109 106   CO2 25 25 25 27   < > 22   < > 27 28 28   BUN 30.5* 21.2 23.4* 23.4*   < > 28.7*   < > 41* 28 40*   PHOS  --   --   --   --   --  3.5  --  3.8 3.6 3.4    < > = values in this interval not displayed.     Recent Labs   Lab Test 09/24/21  0850 05/12/21  0659 03/25/21  1355 03/25/21  1325 12/07/20  0559 12/06/20  1426 10/23/20  1110 03/04/20  0303   INR 0.91 0.91 1.02 Canceled, Test credited   < > 1.22* 0.97 1.16*   PTT 30  --   --   --   --  36 30 39*    < > = values in this interval not displayed.       Imaging:  Review of x-rays shows total knee prosthesis in place, no change in implant position as compared to radiographs postoperatively from 2020.  There is a large knee joint effusion present.  No fracture identified.    PROCEDURES:  PROCEDURE NOTE: Right knee aspiration  After informed verbal consent and identification of the appropriate anatomical site, the skin of the right knee was properly sterilized with a CHG scrub, followed by chloraprep solution. Using a superolateral approach, an 18G needle was introduced into the joint space, yielding ~60cc of  very cloudy yellow synovial fluid. This specimen was properly labeled and promptly delivered to the laboratory.  The aspiration site was cleaned and covered with a sterile dressing.  Patient tolerated this without complication and remained stable in the immediate post-procedure period.       IMPRESSION AND PLAN:  A 64 year old old with concern for right acute prosthetic joint infection.      No other obvious infectious sources at this time, though picture is quite concerning for prosthetic joint infection given her severe symptoms, knee joint effusion, underlying immunocompromised state, significantly elevated inflammatory markers, leukocytosis and the cloudy  appearance of the synovial fluid aspirate.    Joint was aspirated and fluid sent for the following labs:  Cell count and differential  Aerobic/Anaerobic culture  Crystals    Will continue to monitor culture results    Admit the patient to medicine, treat with IV antibiotics per medicine service, trend inflammatory markers.     We will closely follow the synovial fluid studies however high suspicion that this will need operative debridement.  We will start the planning process for this, follow the synovial fluid studies.  Keep n.p.o. until next steps and plan are determined.    - Plan for OR: TBD, pending synovial fluid studies  - Anticoagulation: Hold for OR  - Antibiotics/tetanus: Broad-spectrum antibiotics per medicine  - Xrays/imaging: Complete  - Activity: As tolerated  - Weight Bearing: WBAT   - Pain control: per primary  - Diet: N.p.o.  - Dispo: TBD  - Follow-up: TBD      Chance Camacho MD  Orthopedic surgery resident PGY 4    Patient will be discussed with Dr. Molina    Addendum:  Synovial fluid studies returned with 91,600 total nucleated cells, 96% PMN.  Gram stain with gram-positive cocci.  Cultures in process, crystal analysis and alpha defense and studies pending.    Laboratory testing consistent with prosthetic joint infection.    Will plan for operative management with irrigation and debridement and polyethylene exchange with Dr. Black on 7/13/2024.    Hold anticoagulation until the OR  Okay for diet for now, n.p.o. after midnight for procedure in the morning  Will obtain consent from patient  Continue antibiotics per primary service    Patient discussed with Dr. Molina

## 2024-07-12 NOTE — LETTER
Recipient:    Wale Thurston MD        Sender:    Juliet Cartwright RN, BSN  Care Coordinator, [facility]  Phone [phone]  Pager [phone]        Date: July 16, 2024  Patient Name:  Nicci Pemberton  Patient YOB: 1960  Routing Message:    Discharge Summary      The documents accompanying this notice contain confidential information belonging to the sender.  This information is intended only for the use of the individual or entity named above.  The authorized recipient of this information is prohibited from disclosing this information to any other party and is required to destroy the information after its stated need has been fulfilled, unless otherwise required by state law.    If you are not the intended recipient, you are hereby notified that any disclosure, copy, distribution or action taken in reliance on the contents of these documents is strictly prohibited.  If you have received this document in error, please return it by fax to [phone] with a note on the cover sheet explaining why you are returning it (e.g. not your patient, not your provider, etc.).  If you need further assistance, please call .  Documents may also be returned by mail to [employer], , [facility] [address] [place], [place] [address].

## 2024-07-13 ENCOUNTER — ANESTHESIA (OUTPATIENT)
Dept: SURGERY | Facility: CLINIC | Age: 64
DRG: 486 | End: 2024-07-13
Payer: COMMERCIAL

## 2024-07-13 ENCOUNTER — APPOINTMENT (OUTPATIENT)
Dept: GENERAL RADIOLOGY | Facility: CLINIC | Age: 64
DRG: 486 | End: 2024-07-13
Payer: COMMERCIAL

## 2024-07-13 ENCOUNTER — ANESTHESIA EVENT (OUTPATIENT)
Dept: SURGERY | Facility: CLINIC | Age: 64
DRG: 486 | End: 2024-07-13
Payer: COMMERCIAL

## 2024-07-13 PROBLEM — T84.50XA PROSTHETIC JOINT INFECTION (H): Status: ACTIVE | Noted: 2024-07-13

## 2024-07-13 LAB
ANION GAP SERPL CALCULATED.3IONS-SCNC: 9 MMOL/L (ref 7–15)
BACTERIA SPEC CULT: NORMAL
BASOPHILS # BLD AUTO: 0 10E3/UL (ref 0–0.2)
BASOPHILS NFR BLD AUTO: 0 %
BUN SERPL-MCNC: 22.5 MG/DL (ref 8–23)
CALCIUM SERPL-MCNC: 8.4 MG/DL (ref 8.8–10.2)
CHLORIDE SERPL-SCNC: 107 MMOL/L (ref 98–107)
CREAT SERPL-MCNC: 0.89 MG/DL (ref 0.51–0.95)
DEPRECATED HCO3 PLAS-SCNC: 22 MMOL/L (ref 22–29)
EGFRCR SERPLBLD CKD-EPI 2021: 72 ML/MIN/1.73M2
EOSINOPHIL # BLD AUTO: 0.1 10E3/UL (ref 0–0.7)
EOSINOPHIL NFR BLD AUTO: 1 %
ERYTHROCYTE [DISTWIDTH] IN BLOOD BY AUTOMATED COUNT: 12.1 % (ref 10–15)
GLUCOSE SERPL-MCNC: 92 MG/DL (ref 70–99)
GRAM STAIN RESULT: NORMAL
HBA1C MFR BLD: 5 %
HCT VFR BLD AUTO: 33 % (ref 35–47)
HGB BLD-MCNC: 11.1 G/DL (ref 11.7–15.7)
IMM GRANULOCYTES # BLD: 0.1 10E3/UL
IMM GRANULOCYTES NFR BLD: 1 %
LYMPHOCYTES # BLD AUTO: 0.8 10E3/UL (ref 0.8–5.3)
LYMPHOCYTES NFR BLD AUTO: 8 %
MCH RBC QN AUTO: 34.3 PG (ref 26.5–33)
MCHC RBC AUTO-ENTMCNC: 33.6 G/DL (ref 31.5–36.5)
MCV RBC AUTO: 102 FL (ref 78–100)
MONOCYTES # BLD AUTO: 1 10E3/UL (ref 0–1.3)
MONOCYTES NFR BLD AUTO: 11 %
NEUTROPHILS # BLD AUTO: 7.7 10E3/UL (ref 1.6–8.3)
NEUTROPHILS NFR BLD AUTO: 79 %
NRBC # BLD AUTO: 0 10E3/UL
NRBC BLD AUTO-RTO: 0 /100
PLATELET # BLD AUTO: 89 10E3/UL (ref 150–450)
POTASSIUM SERPL-SCNC: 4 MMOL/L (ref 3.4–5.3)
RBC # BLD AUTO: 3.24 10E6/UL (ref 3.8–5.2)
SODIUM SERPL-SCNC: 138 MMOL/L (ref 135–145)
WBC # BLD AUTO: 9.7 10E3/UL (ref 4–11)

## 2024-07-13 PROCEDURE — 999N000141 HC STATISTIC PRE-PROCEDURE NURSING ASSESSMENT: Performed by: ORTHOPAEDIC SURGERY

## 2024-07-13 PROCEDURE — 80048 BASIC METABOLIC PNL TOTAL CA: CPT | Performed by: PHYSICIAN ASSISTANT

## 2024-07-13 PROCEDURE — 27486 REVISE/REPLACE KNEE JOINT: CPT | Performed by: NURSE ANESTHETIST, CERTIFIED REGISTERED

## 2024-07-13 PROCEDURE — 250N000011 HC RX IP 250 OP 636: Performed by: ORTHOPAEDIC SURGERY

## 2024-07-13 PROCEDURE — 250N000013 HC RX MED GY IP 250 OP 250 PS 637: Performed by: INTERNAL MEDICINE

## 2024-07-13 PROCEDURE — 99233 SBSQ HOSP IP/OBS HIGH 50: CPT | Performed by: INTERNAL MEDICINE

## 2024-07-13 PROCEDURE — 999N000065 XR KNEE PORT RIGHT 1/2 VIEWS: Mod: RT

## 2024-07-13 PROCEDURE — 250N000025 HC SEVOFLURANE, PER MIN: Performed by: ORTHOPAEDIC SURGERY

## 2024-07-13 PROCEDURE — 258N000003 HC RX IP 258 OP 636: Performed by: ANESTHESIOLOGY

## 2024-07-13 PROCEDURE — 360N000078 HC SURGERY LEVEL 5, PER MIN: Performed by: ORTHOPAEDIC SURGERY

## 2024-07-13 PROCEDURE — 250N000011 HC RX IP 250 OP 636: Performed by: ANESTHESIOLOGY

## 2024-07-13 PROCEDURE — 258N000003 HC RX IP 258 OP 636: Performed by: EMERGENCY MEDICINE

## 2024-07-13 PROCEDURE — 250N000011 HC RX IP 250 OP 636: Performed by: STUDENT IN AN ORGANIZED HEALTH CARE EDUCATION/TRAINING PROGRAM

## 2024-07-13 PROCEDURE — 36415 COLL VENOUS BLD VENIPUNCTURE: CPT | Performed by: PHYSICIAN ASSISTANT

## 2024-07-13 PROCEDURE — 258N000003 HC RX IP 258 OP 636

## 2024-07-13 PROCEDURE — 258N000003 HC RX IP 258 OP 636: Performed by: NURSE ANESTHETIST, CERTIFIED REGISTERED

## 2024-07-13 PROCEDURE — 87102 FUNGUS ISOLATION CULTURE: CPT | Performed by: ORTHOPAEDIC SURGERY

## 2024-07-13 PROCEDURE — 250N000013 HC RX MED GY IP 250 OP 250 PS 637: Performed by: NURSE ANESTHETIST, CERTIFIED REGISTERED

## 2024-07-13 PROCEDURE — 99254 IP/OBS CNSLTJ NEW/EST MOD 60: CPT | Performed by: STUDENT IN AN ORGANIZED HEALTH CARE EDUCATION/TRAINING PROGRAM

## 2024-07-13 PROCEDURE — 27486 REVISE/REPLACE KNEE JOINT: CPT | Performed by: ANESTHESIOLOGY

## 2024-07-13 PROCEDURE — 250N000013 HC RX MED GY IP 250 OP 250 PS 637

## 2024-07-13 PROCEDURE — 0SUV09Z SUPPLEMENT RIGHT KNEE JOINT, TIBIAL SURFACE WITH LINER, OPEN APPROACH: ICD-10-PCS | Performed by: ORTHOPAEDIC SURGERY

## 2024-07-13 PROCEDURE — 360N000077 HC SURGERY LEVEL 4, PER MIN: Performed by: ORTHOPAEDIC SURGERY

## 2024-07-13 PROCEDURE — 87075 CULTR BACTERIA EXCEPT BLOOD: CPT | Performed by: ORTHOPAEDIC SURGERY

## 2024-07-13 PROCEDURE — 250N000011 HC RX IP 250 OP 636: Performed by: NURSE ANESTHETIST, CERTIFIED REGISTERED

## 2024-07-13 PROCEDURE — 250N000011 HC RX IP 250 OP 636

## 2024-07-13 PROCEDURE — C1776 JOINT DEVICE (IMPLANTABLE): HCPCS | Performed by: ORTHOPAEDIC SURGERY

## 2024-07-13 PROCEDURE — 710N000010 HC RECOVERY PHASE 1, LEVEL 2, PER MIN: Performed by: ORTHOPAEDIC SURGERY

## 2024-07-13 PROCEDURE — 258N000001 HC RX 258: Performed by: ORTHOPAEDIC SURGERY

## 2024-07-13 PROCEDURE — 0SPC09Z REMOVAL OF LINER FROM RIGHT KNEE JOINT, OPEN APPROACH: ICD-10-PCS | Performed by: ORTHOPAEDIC SURGERY

## 2024-07-13 PROCEDURE — 250N000009 HC RX 250: Performed by: EMERGENCY MEDICINE

## 2024-07-13 PROCEDURE — 85025 COMPLETE CBC W/AUTO DIFF WBC: CPT | Performed by: PHYSICIAN ASSISTANT

## 2024-07-13 PROCEDURE — 250N000011 HC RX IP 250 OP 636: Performed by: INTERNAL MEDICINE

## 2024-07-13 PROCEDURE — 250N000013 HC RX MED GY IP 250 OP 250 PS 637: Performed by: ANESTHESIOLOGY

## 2024-07-13 PROCEDURE — 272N000001 HC OR GENERAL SUPPLY STERILE: Performed by: ORTHOPAEDIC SURGERY

## 2024-07-13 PROCEDURE — 370N000017 HC ANESTHESIA TECHNICAL FEE, PER MIN: Performed by: ORTHOPAEDIC SURGERY

## 2024-07-13 PROCEDURE — 87205 SMEAR GRAM STAIN: CPT | Performed by: ORTHOPAEDIC SURGERY

## 2024-07-13 PROCEDURE — 250N000012 HC RX MED GY IP 250 OP 636 PS 637: Performed by: PHYSICIAN ASSISTANT

## 2024-07-13 PROCEDURE — 250N000013 HC RX MED GY IP 250 OP 250 PS 637: Performed by: PHYSICIAN ASSISTANT

## 2024-07-13 PROCEDURE — 87206 SMEAR FLUORESCENT/ACID STAI: CPT | Performed by: ORTHOPAEDIC SURGERY

## 2024-07-13 PROCEDURE — 250N000009 HC RX 250: Performed by: NURSE ANESTHETIST, CERTIFIED REGISTERED

## 2024-07-13 PROCEDURE — 87070 CULTURE OTHR SPECIMN AEROBIC: CPT | Performed by: ORTHOPAEDIC SURGERY

## 2024-07-13 PROCEDURE — 120N000002 HC R&B MED SURG/OB UMMC

## 2024-07-13 PROCEDURE — 250N000011 HC RX IP 250 OP 636: Performed by: PHYSICIAN ASSISTANT

## 2024-07-13 PROCEDURE — 83036 HEMOGLOBIN GLYCOSYLATED A1C: CPT | Performed by: STUDENT IN AN ORGANIZED HEALTH CARE EDUCATION/TRAINING PROGRAM

## 2024-07-13 DEVICE — IMPLANTABLE DEVICE
Type: IMPLANTABLE DEVICE | Site: KNEE | Status: FUNCTIONAL
Brand: BIOMET® KNEE SYSTEM

## 2024-07-13 DEVICE — IMPLANTABLE DEVICE
Type: IMPLANTABLE DEVICE | Site: KNEE | Status: FUNCTIONAL
Brand: VANGUARD® KNEE SYSTEM

## 2024-07-13 RX ORDER — ACETAMINOPHEN 325 MG/1
650 TABLET ORAL EVERY 4 HOURS PRN
Status: DISCONTINUED | OUTPATIENT
Start: 2024-07-16 | End: 2024-07-16 | Stop reason: HOSPADM

## 2024-07-13 RX ORDER — LIDOCAINE HYDROCHLORIDE 20 MG/ML
INJECTION, SOLUTION INFILTRATION; PERINEURAL PRN
Status: DISCONTINUED | OUTPATIENT
Start: 2024-07-13 | End: 2024-07-13

## 2024-07-13 RX ORDER — HYDROMORPHONE HYDROCHLORIDE 1 MG/ML
0.2 INJECTION, SOLUTION INTRAMUSCULAR; INTRAVENOUS; SUBCUTANEOUS EVERY 5 MIN PRN
Status: DISCONTINUED | OUTPATIENT
Start: 2024-07-13 | End: 2024-07-13 | Stop reason: HOSPADM

## 2024-07-13 RX ORDER — ONDANSETRON 2 MG/ML
4 INJECTION INTRAMUSCULAR; INTRAVENOUS EVERY 6 HOURS PRN
Status: DISCONTINUED | OUTPATIENT
Start: 2024-07-13 | End: 2024-07-16 | Stop reason: HOSPADM

## 2024-07-13 RX ORDER — HYDRALAZINE HYDROCHLORIDE 20 MG/ML
2.5-5 INJECTION INTRAMUSCULAR; INTRAVENOUS EVERY 10 MIN PRN
Status: DISCONTINUED | OUTPATIENT
Start: 2024-07-13 | End: 2024-07-13 | Stop reason: HOSPADM

## 2024-07-13 RX ORDER — OXYCODONE HYDROCHLORIDE 10 MG/1
10 TABLET ORAL EVERY 4 HOURS PRN
Status: DISCONTINUED | OUTPATIENT
Start: 2024-07-13 | End: 2024-07-13

## 2024-07-13 RX ORDER — PROCHLORPERAZINE MALEATE 5 MG
10 TABLET ORAL EVERY 6 HOURS PRN
Status: DISCONTINUED | OUTPATIENT
Start: 2024-07-13 | End: 2024-07-16 | Stop reason: HOSPADM

## 2024-07-13 RX ORDER — FENTANYL CITRATE 50 UG/ML
50 INJECTION, SOLUTION INTRAMUSCULAR; INTRAVENOUS EVERY 5 MIN PRN
Status: DISCONTINUED | OUTPATIENT
Start: 2024-07-13 | End: 2024-07-13 | Stop reason: HOSPADM

## 2024-07-13 RX ORDER — FENTANYL CITRATE 50 UG/ML
INJECTION, SOLUTION INTRAMUSCULAR; INTRAVENOUS PRN
Status: DISCONTINUED | OUTPATIENT
Start: 2024-07-13 | End: 2024-07-13

## 2024-07-13 RX ORDER — PROPOFOL 10 MG/ML
INJECTION, EMULSION INTRAVENOUS PRN
Status: DISCONTINUED | OUTPATIENT
Start: 2024-07-13 | End: 2024-07-13

## 2024-07-13 RX ORDER — HYDROMORPHONE HYDROCHLORIDE 1 MG/ML
0.5 INJECTION, SOLUTION INTRAMUSCULAR; INTRAVENOUS; SUBCUTANEOUS
Status: DISCONTINUED | OUTPATIENT
Start: 2024-07-13 | End: 2024-07-16 | Stop reason: HOSPADM

## 2024-07-13 RX ORDER — METHOCARBAMOL 500 MG/1
500 TABLET, FILM COATED ORAL 4 TIMES DAILY
Status: DISCONTINUED | OUTPATIENT
Start: 2024-07-13 | End: 2024-07-16 | Stop reason: HOSPADM

## 2024-07-13 RX ORDER — ASPIRIN 81 MG/1
81 TABLET ORAL 2 TIMES DAILY
Status: DISCONTINUED | OUTPATIENT
Start: 2024-07-13 | End: 2024-07-13

## 2024-07-13 RX ORDER — CEFAZOLIN SODIUM/WATER 2 G/20 ML
2 SYRINGE (ML) INTRAVENOUS
Status: COMPLETED | OUTPATIENT
Start: 2024-07-13 | End: 2024-07-13

## 2024-07-13 RX ORDER — ONDANSETRON 2 MG/ML
4 INJECTION INTRAMUSCULAR; INTRAVENOUS EVERY 30 MIN PRN
Status: DISCONTINUED | OUTPATIENT
Start: 2024-07-13 | End: 2024-07-13 | Stop reason: HOSPADM

## 2024-07-13 RX ORDER — ONDANSETRON 4 MG/1
4 TABLET, ORALLY DISINTEGRATING ORAL EVERY 6 HOURS PRN
Status: DISCONTINUED | OUTPATIENT
Start: 2024-07-13 | End: 2024-07-16 | Stop reason: HOSPADM

## 2024-07-13 RX ORDER — ONDANSETRON 4 MG/1
4 TABLET, ORALLY DISINTEGRATING ORAL EVERY 30 MIN PRN
Status: DISCONTINUED | OUTPATIENT
Start: 2024-07-13 | End: 2024-07-13 | Stop reason: HOSPADM

## 2024-07-13 RX ORDER — TRANEXAMIC ACID 10 MG/ML
1 INJECTION, SOLUTION INTRAVENOUS ONCE
Status: DISCONTINUED | OUTPATIENT
Start: 2024-07-13 | End: 2024-07-13 | Stop reason: HOSPADM

## 2024-07-13 RX ORDER — AMOXICILLIN 250 MG
1 CAPSULE ORAL 2 TIMES DAILY
Status: DISCONTINUED | OUTPATIENT
Start: 2024-07-13 | End: 2024-07-13

## 2024-07-13 RX ORDER — NALOXONE HYDROCHLORIDE 0.4 MG/ML
0.1 INJECTION, SOLUTION INTRAMUSCULAR; INTRAVENOUS; SUBCUTANEOUS
Status: DISCONTINUED | OUTPATIENT
Start: 2024-07-13 | End: 2024-07-13 | Stop reason: HOSPADM

## 2024-07-13 RX ORDER — METOPROLOL TARTRATE 1 MG/ML
1-2 INJECTION, SOLUTION INTRAVENOUS EVERY 5 MIN PRN
Status: DISCONTINUED | OUTPATIENT
Start: 2024-07-13 | End: 2024-07-13 | Stop reason: HOSPADM

## 2024-07-13 RX ORDER — DEXAMETHASONE SODIUM PHOSPHATE 4 MG/ML
4 INJECTION, SOLUTION INTRA-ARTICULAR; INTRALESIONAL; INTRAMUSCULAR; INTRAVENOUS; SOFT TISSUE
Status: DISCONTINUED | OUTPATIENT
Start: 2024-07-13 | End: 2024-07-13 | Stop reason: HOSPADM

## 2024-07-13 RX ORDER — POLYETHYLENE GLYCOL 3350 17 G/17G
17 POWDER, FOR SOLUTION ORAL DAILY
Status: DISCONTINUED | OUTPATIENT
Start: 2024-07-14 | End: 2024-07-16 | Stop reason: HOSPADM

## 2024-07-13 RX ORDER — LIDOCAINE 40 MG/G
CREAM TOPICAL
Status: DISCONTINUED | OUTPATIENT
Start: 2024-07-13 | End: 2024-07-16 | Stop reason: HOSPADM

## 2024-07-13 RX ORDER — SODIUM CHLORIDE, SODIUM LACTATE, POTASSIUM CHLORIDE, CALCIUM CHLORIDE 600; 310; 30; 20 MG/100ML; MG/100ML; MG/100ML; MG/100ML
INJECTION, SOLUTION INTRAVENOUS CONTINUOUS
Status: DISCONTINUED | OUTPATIENT
Start: 2024-07-13 | End: 2024-07-16 | Stop reason: HOSPADM

## 2024-07-13 RX ORDER — OXYCODONE HYDROCHLORIDE 5 MG/1
5 TABLET ORAL EVERY 4 HOURS PRN
Status: DISCONTINUED | OUTPATIENT
Start: 2024-07-13 | End: 2024-07-13

## 2024-07-13 RX ORDER — ALBUTEROL SULFATE 90 UG/1
AEROSOL, METERED RESPIRATORY (INHALATION) PRN
Status: DISCONTINUED | OUTPATIENT
Start: 2024-07-13 | End: 2024-07-13

## 2024-07-13 RX ORDER — SODIUM CHLORIDE 9 MG/ML
INJECTION, SOLUTION INTRAVENOUS CONTINUOUS PRN
Status: DISCONTINUED | OUTPATIENT
Start: 2024-07-13 | End: 2024-07-13

## 2024-07-13 RX ORDER — VANCOMYCIN HYDROCHLORIDE 1 G/20ML
INJECTION, POWDER, LYOPHILIZED, FOR SOLUTION INTRAVENOUS PRN
Status: DISCONTINUED | OUTPATIENT
Start: 2024-07-13 | End: 2024-07-13 | Stop reason: HOSPADM

## 2024-07-13 RX ORDER — CEFAZOLIN SODIUM/WATER 2 G/20 ML
2 SYRINGE (ML) INTRAVENOUS SEE ADMIN INSTRUCTIONS
Status: DISCONTINUED | OUTPATIENT
Start: 2024-07-13 | End: 2024-07-13 | Stop reason: HOSPADM

## 2024-07-13 RX ORDER — OXYCODONE HYDROCHLORIDE 5 MG/1
5-10 TABLET ORAL
Status: DISCONTINUED | OUTPATIENT
Start: 2024-07-13 | End: 2024-07-16 | Stop reason: HOSPADM

## 2024-07-13 RX ORDER — ACETAMINOPHEN 325 MG/1
975 TABLET ORAL EVERY 8 HOURS
Status: COMPLETED | OUTPATIENT
Start: 2024-07-13 | End: 2024-07-16

## 2024-07-13 RX ORDER — ASPIRIN 81 MG/1
162 TABLET, CHEWABLE ORAL DAILY
Status: DISCONTINUED | OUTPATIENT
Start: 2024-07-14 | End: 2024-07-16 | Stop reason: HOSPADM

## 2024-07-13 RX ORDER — PROPOFOL 10 MG/ML
INJECTION, EMULSION INTRAVENOUS CONTINUOUS PRN
Status: DISCONTINUED | OUTPATIENT
Start: 2024-07-13 | End: 2024-07-13

## 2024-07-13 RX ORDER — GABAPENTIN 100 MG/1
200 CAPSULE ORAL 3 TIMES DAILY
Status: DISCONTINUED | OUTPATIENT
Start: 2024-07-13 | End: 2024-07-16 | Stop reason: HOSPADM

## 2024-07-13 RX ORDER — OXYCODONE HYDROCHLORIDE 5 MG/1
5-10 TABLET ORAL EVERY 4 HOURS PRN
Status: DISCONTINUED | OUTPATIENT
Start: 2024-07-13 | End: 2024-07-13

## 2024-07-13 RX ORDER — APREPITANT 40 MG/1
40 CAPSULE ORAL ONCE
Status: DISCONTINUED | OUTPATIENT
Start: 2024-07-13 | End: 2024-07-13

## 2024-07-13 RX ORDER — LIDOCAINE 40 MG/G
CREAM TOPICAL
Status: DISCONTINUED | OUTPATIENT
Start: 2024-07-13 | End: 2024-07-13 | Stop reason: HOSPADM

## 2024-07-13 RX ORDER — GABAPENTIN 100 MG/1
100 CAPSULE ORAL 3 TIMES DAILY
Status: DISCONTINUED | OUTPATIENT
Start: 2024-07-13 | End: 2024-07-13

## 2024-07-13 RX ORDER — BISACODYL 10 MG
10 SUPPOSITORY, RECTAL RECTAL DAILY PRN
Status: DISCONTINUED | OUTPATIENT
Start: 2024-07-16 | End: 2024-07-16 | Stop reason: HOSPADM

## 2024-07-13 RX ORDER — ACETAMINOPHEN 325 MG/1
975 TABLET ORAL ONCE
Status: DISCONTINUED | OUTPATIENT
Start: 2024-07-13 | End: 2024-07-13

## 2024-07-13 RX ORDER — SODIUM CHLORIDE, SODIUM LACTATE, POTASSIUM CHLORIDE, CALCIUM CHLORIDE 600; 310; 30; 20 MG/100ML; MG/100ML; MG/100ML; MG/100ML
INJECTION, SOLUTION INTRAVENOUS CONTINUOUS
Status: DISCONTINUED | OUTPATIENT
Start: 2024-07-13 | End: 2024-07-13 | Stop reason: HOSPADM

## 2024-07-13 RX ORDER — HYDROMORPHONE HYDROCHLORIDE 1 MG/ML
0.4 INJECTION, SOLUTION INTRAMUSCULAR; INTRAVENOUS; SUBCUTANEOUS EVERY 5 MIN PRN
Status: DISCONTINUED | OUTPATIENT
Start: 2024-07-13 | End: 2024-07-13 | Stop reason: HOSPADM

## 2024-07-13 RX ORDER — FENTANYL CITRATE 50 UG/ML
25 INJECTION, SOLUTION INTRAMUSCULAR; INTRAVENOUS EVERY 5 MIN PRN
Status: DISCONTINUED | OUTPATIENT
Start: 2024-07-13 | End: 2024-07-13 | Stop reason: HOSPADM

## 2024-07-13 RX ADMIN — KETAMINE HYDROCHLORIDE 10 MG/HR: 10 INJECTION INTRAMUSCULAR; INTRAVENOUS at 20:27

## 2024-07-13 RX ADMIN — SENNOSIDES AND DOCUSATE SODIUM 1 TABLET: 50; 8.6 TABLET ORAL at 15:04

## 2024-07-13 RX ADMIN — OXYCODONE HYDROCHLORIDE 5 MG: 5 TABLET ORAL at 02:24

## 2024-07-13 RX ADMIN — FENTANYL CITRATE 50 MCG: 50 INJECTION INTRAMUSCULAR; INTRAVENOUS at 10:38

## 2024-07-13 RX ADMIN — HYDROMORPHONE HYDROCHLORIDE 0.2 MG: 1 INJECTION, SOLUTION INTRAMUSCULAR; INTRAVENOUS; SUBCUTANEOUS at 11:55

## 2024-07-13 RX ADMIN — CEFTRIAXONE 2 G: 2 INJECTION, POWDER, FOR SOLUTION INTRAMUSCULAR; INTRAVENOUS at 18:19

## 2024-07-13 RX ADMIN — TRANEXAMIC ACID 1 G: 1 INJECTION, SOLUTION INTRAVENOUS at 08:21

## 2024-07-13 RX ADMIN — FENTANYL CITRATE 100 MCG: 50 INJECTION INTRAMUSCULAR; INTRAVENOUS at 08:15

## 2024-07-13 RX ADMIN — ROSUVASTATIN CALCIUM 5 MG: 5 TABLET, COATED ORAL at 20:42

## 2024-07-13 RX ADMIN — FENTANYL CITRATE 50 MCG: 50 INJECTION INTRAMUSCULAR; INTRAVENOUS at 11:28

## 2024-07-13 RX ADMIN — ACETAMINOPHEN 975 MG: 325 TABLET, FILM COATED ORAL at 14:49

## 2024-07-13 RX ADMIN — GABAPENTIN 200 MG: 100 CAPSULE ORAL at 20:41

## 2024-07-13 RX ADMIN — SENNOSIDES AND DOCUSATE SODIUM 2 TABLET: 50; 8.6 TABLET ORAL at 20:41

## 2024-07-13 RX ADMIN — TRANEXAMIC ACID 1 G: 1 INJECTION, SOLUTION INTRAVENOUS at 10:29

## 2024-07-13 RX ADMIN — SUGAMMADEX 200 MG: 100 INJECTION, SOLUTION INTRAVENOUS at 11:09

## 2024-07-13 RX ADMIN — METHOCARBAMOL 500 MG: 500 TABLET ORAL at 17:06

## 2024-07-13 RX ADMIN — ONDANSETRON 4 MG: 2 INJECTION INTRAMUSCULAR; INTRAVENOUS at 14:49

## 2024-07-13 RX ADMIN — SODIUM CHLORIDE, POTASSIUM CHLORIDE, SODIUM LACTATE AND CALCIUM CHLORIDE: 600; 310; 30; 20 INJECTION, SOLUTION INTRAVENOUS at 15:03

## 2024-07-13 RX ADMIN — OXYCODONE HYDROCHLORIDE 10 MG: 5 TABLET ORAL at 23:52

## 2024-07-13 RX ADMIN — GABAPENTIN 100 MG: 100 CAPSULE ORAL at 14:48

## 2024-07-13 RX ADMIN — ACETAMINOPHEN 975 MG: 325 TABLET, FILM COATED ORAL at 22:45

## 2024-07-13 RX ADMIN — ALBUTEROL SULFATE 6 PUFF: 108 INHALANT RESPIRATORY (INHALATION) at 11:06

## 2024-07-13 RX ADMIN — OXYCODONE HYDROCHLORIDE 5 MG: 5 TABLET ORAL at 13:05

## 2024-07-13 RX ADMIN — PHENYLEPHRINE HYDROCHLORIDE 100 MCG: 10 INJECTION INTRAVENOUS at 08:45

## 2024-07-13 RX ADMIN — CYCLOSPORINE 50 MG: 50 CAPSULE, LIQUID FILLED ORAL at 23:50

## 2024-07-13 RX ADMIN — METHOCARBAMOL 500 MG: 500 TABLET ORAL at 20:42

## 2024-07-13 RX ADMIN — PHENYLEPHRINE HYDROCHLORIDE 100 MCG: 10 INJECTION INTRAVENOUS at 08:15

## 2024-07-13 RX ADMIN — SODIUM CHLORIDE: 9 INJECTION, SOLUTION INTRAVENOUS at 08:08

## 2024-07-13 RX ADMIN — CYCLOSPORINE 75 MG: 25 CAPSULE, LIQUID FILLED ORAL at 15:04

## 2024-07-13 RX ADMIN — FENTANYL CITRATE 50 MCG: 50 INJECTION INTRAMUSCULAR; INTRAVENOUS at 10:59

## 2024-07-13 RX ADMIN — KETAMINE HYDROCHLORIDE 10 MG/HR: 10 INJECTION INTRAMUSCULAR; INTRAVENOUS at 02:04

## 2024-07-13 RX ADMIN — ACETAMINOPHEN 975 MG: 325 TABLET, FILM COATED ORAL at 07:29

## 2024-07-13 RX ADMIN — FOSAPREPITANT DIMEGLUMINE 150 MG: 150 INJECTION, POWDER, LYOPHILIZED, FOR SOLUTION INTRAVENOUS at 09:12

## 2024-07-13 RX ADMIN — Medication 50 MG: at 08:15

## 2024-07-13 RX ADMIN — PROPOFOL 20 MCG/KG/MIN: 10 INJECTION, EMULSION INTRAVENOUS at 08:49

## 2024-07-13 RX ADMIN — FENTANYL CITRATE 50 MCG: 50 INJECTION INTRAMUSCULAR; INTRAVENOUS at 11:42

## 2024-07-13 RX ADMIN — HYDROMORPHONE HYDROCHLORIDE 0.2 MG: 1 INJECTION, SOLUTION INTRAMUSCULAR; INTRAVENOUS; SUBCUTANEOUS at 12:28

## 2024-07-13 RX ADMIN — Medication 2 G: at 09:17

## 2024-07-13 RX ADMIN — HYDROMORPHONE HYDROCHLORIDE 0.4 MG: 0.2 INJECTION, SOLUTION INTRAMUSCULAR; INTRAVENOUS; SUBCUTANEOUS at 14:49

## 2024-07-13 RX ADMIN — PHENYLEPHRINE HYDROCHLORIDE 0.5 MCG/KG/MIN: 10 INJECTION INTRAVENOUS at 08:58

## 2024-07-13 RX ADMIN — VANCOMYCIN HYDROCHLORIDE 1000 MG: 1 INJECTION, SOLUTION INTRAVENOUS at 21:04

## 2024-07-13 RX ADMIN — Medication 112.5 MCG: at 15:04

## 2024-07-13 RX ADMIN — LIDOCAINE HYDROCHLORIDE 100 MG: 20 INJECTION, SOLUTION INFILTRATION; PERINEURAL at 08:15

## 2024-07-13 RX ADMIN — FAMOTIDINE 20 MG: 20 TABLET ORAL at 21:23

## 2024-07-13 RX ADMIN — PROPOFOL 150 MG: 10 INJECTION, EMULSION INTRAVENOUS at 08:15

## 2024-07-13 ASSESSMENT — ACTIVITIES OF DAILY LIVING (ADL)
ADLS_ACUITY_SCORE: 39

## 2024-07-13 ASSESSMENT — LIFESTYLE VARIABLES: TOBACCO_USE: 1

## 2024-07-13 NOTE — PROGRESS NOTES
65yo female patient with infected R TKA from 2020 by Dr. Wallace. Prior hx infection of L TKA in 2021. Patient seen and examined preoperatively this morning in the preop area at [facility]. Treatment plan, risks benefits and alternatives to surgery, and postop plan were all discussed in layman's terms. The correct surgical site was marked with patient participation. All questions this very pleasant patient had at this time were answered.

## 2024-07-13 NOTE — PROGRESS NOTES
"  VS: /64 (BP Location: Right arm, Patient Position: Supine, Cuff Size: Adult Large)   Pulse 71   Temp 98.2  F (36.8  C) (Oral)   Resp 18   Ht 1.575 m (5' 2\")   Wt 101.6 kg (224 lb)   SpO2 98%   BMI 40.97 kg/m      O2: 3L O2   Output: Purewick in place   Last BM: 7/11   Activity: 2 assist w/ GB & walker   Skin: Intact except for R knee incision   Pain: Complains of constant burning in R knee   CMS: Intact, denies numbness or tingling   Dressing: R knee dressing Ace wrap, CDI   Diet: reg   LDA: PIV in LAC & RFA   Equipment: GB, Walker   Plan: Pain control, monitor, IV antibiotics, PT in the AM   Additional Info:       "

## 2024-07-13 NOTE — ANESTHESIA CARE TRANSFER NOTE
Patient: Nicci Pemberton    Procedure: Procedure(s):  Irrigation and debridement lower extremity, combined  Polyethylene exchange       Diagnosis: Prosthetic joint infection (H24) [T84.50XA]  Diagnosis Additional Information: No value filed.    Anesthesia Type:   General     Note:    Oropharynx: oropharynx clear of all foreign objects and spontaneously breathing  Level of Consciousness: awake  Oxygen Supplementation: face mask  Level of Supplemental Oxygen (L/min / FiO2): 8  Independent Airway: airway patency satisfactory and stable  Dentition: dentition unchanged  Vital Signs Stable: post-procedure vital signs reviewed and stable  Report to RN Given: handoff report given  Patient transferred to: PACU    Handoff Report: Identifed the Patient, Identified the Reponsible Provider, Reviewed the pertinent medical history, Discussed the surgical course, Reviewed Intra-OP anesthesia mangement and issues during anesthesia, Set expectations for post-procedure period and Allowed opportunity for questions and acknowledgement of understanding      Vitals:  Vitals Value Taken Time   /69 07/13/24 1130   Temp 36.8  C (98.2  F) 07/13/24 1121   Pulse 79 07/13/24 1134   Resp 8 07/13/24 1134   SpO2 98 % 07/13/24 1134   Vitals shown include unfiled device data.    Electronically Signed By: SANTINO Landaverde CRNA  July 13, 2024  11:35 AM

## 2024-07-13 NOTE — PROGRESS NOTES
Orthopedic Surgery Progress Note 07/13/2024    S: No acute events overnight. Pain controlled. Denies any new numbness or weakness in the extremity. Understands plan for OR. Ready for OR    O:  Temp: 98.2  F (36.8  C) Temp src: Oral BP: 101/61 Pulse: 82   Resp: 18 SpO2: 97 % O2 Device: None (Room air)      Exam:  Gen: A&O, NAD  Resp: non-labored on RA  MSK:    RLE  - swelling about knee  -well healed midline knee incision  - Fires TA/Gsc/FHL/EHL  - SILT sa/gaston/t/spn/dpn  - DP and PT palpable  - Foot/toes wwp      Recent Labs   Lab 07/13/24  0620 07/12/24  1003   WBC 9.7 14.7*   HGB 11.1* 12.1   PLT 89* 99*       Culture results:   30-Day Micro Results       Collected Updated Procedure Result Status      07/12/2024 1833 07/12/2024 1925 Asymptomatic Influenza A/B, RSV, & SARS-CoV2 PCR (COVID-19) Nose [[id number]]    Swab from Nose    Final result Component Value   Influenza A PCR Negative   Influenza B PCR Negative   RSV PCR Negative   SARS CoV2 PCR Negative   NEGATIVE: SARS-CoV-2 (COVID-19) RNA not detected, presumed negative.            07/12/2024 1530 07/13/2024 0346 Blood Culture Arm, Right [[id number]]   Blood from Arm, Right    Preliminary result Component Value   Culture No growth after 12 hours  [P]                07/12/2024 1324 07/13/2024 0131 Blood Culture Arm, Left [[id number]]   Blood from Arm, Left    Preliminary result Component Value   Culture No growth after 12 hours  [P]                07/12/2024 1300 07/12/2024 1951 Joint Fluid Exam [[id number]]    Synovial fluid from Knee, Right    Final result Component Value   No component results            07/12/2024 1300 07/12/2024 1503 Cell count with differential fluid [[id number]]    (Abnormal)   Synovial fluid from Knee, Right    Final result Component Value   No component results            07/12/2024 1300 07/12/2024 1806 Synovial fluid Aerobic Bacterial Culture Routine With Gram Stain [[id number]]   (Abnormal)   Synovial fluid from Knee, Right     Preliminary result Component Value   Gram Stain Result 1+ Gram positive cocci  [P]     4+ WBC seen  [P]     Predominantly PMNs               07/12/2024 1300 07/12/2024 1314 Anaerobic Bacterial Culture Routine [[id number]]   Synovial fluid from Knee, Right    In process Component Value   No component results               07/12/2024 1300 07/12/2024 1951 Crystal ID Synovial Fluid [[id number]]   Synovial fluid from Knee, Right    Final result Component Value   Crystals Analysis No clinically significant crystals seen.            07/12/2024 1300 07/12/2024 1501 Cell Count Body Fluid [[id number]]    (Abnormal)   Synovial fluid from Knee, Right    Final result Component Value Units   Color Yellow    Clarity Cloudy    Cell Count Fluid Source Knee, Right    Total Nucleated Cells 91,600 /uL            07/12/2024 1300 07/12/2024 1503 Differential Body Fluid [[id number]]    Synovial fluid from Knee, Right    Final result Component Value Units   % Neutrophils 96 %   % Lymphocytes 0 %   % Monocyte/Macrophages 4 %                       Assessment: Nicci Pemberton is a 64 year old female with right knee prosthetic joint infection.    Plan is for irrigation and debridement and exchange of polyethylene with Dr. Black today 7/13/2024.    Keep n.p.o. until OR.  Hold anticoagulation until OR.  Consent completed.    Plan:  medicine primary  Activity: Up with assist until independent.  Weight bearing status: WBAT.  Antibiotics: vanc and zosyn  Diet: NPO for OR today  DVT prophylaxis: hold for OR  Bracing/Splinting: none  Pain management: Multimodal. OK for NSAIDs from ortho standpoint.  Labs: Trend inflammatory markers.  Cultures: follow cultures from OR.  Disposition: To OR today.  Follow-up: TBD.    Staff for this patient is Dr. Molina & Sofia.    Chance Camacho MD   Ortho resident

## 2024-07-13 NOTE — ANESTHESIA POSTPROCEDURE EVALUATION
Patient: Nicci Pemberton    Procedure: Procedure(s):  Irrigation and debridement lower extremity, combined  Polyethylene exchange       Anesthesia Type:  General    Note:  Disposition: Inpatient   Postop Pain Control: Uneventful            Sign Out: Well controlled pain   PONV: No   Neuro/Psych: Uneventful            Sign Out: Acceptable/Baseline neuro status   Airway/Respiratory: Uneventful            Sign Out: Acceptable/Baseline resp. status   CV/Hemodynamics: Uneventful            Sign Out: Acceptable CV status; No obvious hypovolemia; No obvious fluid overload   Other NRE:    DID A NON-ROUTINE EVENT OCCUR? No           Last vitals:  Vitals Value Taken Time   /76 07/13/24 1315   Temp 36.8  C (98.2  F) 07/13/24 1245   Pulse 74 07/13/24 1315   Resp 23 07/13/24 1315   SpO2 97 % 07/13/24 1332   Vitals shown include unfiled device data.    Electronically Signed By: Juan Diego Valenzuela MD  July 13, 2024  3:06 PM

## 2024-07-13 NOTE — ANESTHESIA PREPROCEDURE EVALUATION
Anesthesia Pre-Procedure Evaluation    Patient: Nicci Pemberton   MRN: [id number] : 1960        Procedure : Procedure(s):  Irrigation and debridement lower extremity, combined  Polyethylene exchange          Past Medical History:   Diagnosis Date    Anemia     Cellulitis     Cirrhosis of liver (H) 2018    cirrhosis secondary to combination of nonalcoholic fatty liver disease, iron overload, and alpha-1 antitrypsin MZ phenotype who is s/p  donor liver transplant on 19    Gastroesophageal reflux disease     Heterozygous alpha 1-antitrypsin deficiency (H)     High hepatic iron concentration determined by biopsy of liver     Incisional infection 2021    Left TKA incision    Liver cirrhosis secondary to ANGULO (H)     Liver transplanted (H) 2019    Lymphedema     Osteoarthritis     knees    Other chronic pain     Left knee    SBP (spontaneous bacterial peritonitis) (H) 2019 in [facility]    Thrombocytopenia (H24) 2020    Thyroid disease     Hypothyroid      Past Surgical History:   Procedure Laterality Date    ARTHROPLASTY KNEE Right 10/23/2020    Procedure: Right  total knee arthroplasty;  Surgeon: Mario Wallace MD;  [facility]    ARTHROPLASTY KNEE Left 2021    Procedure: Left total knee arthroplasty;  Surgeon: Mario Wallace MD;  [facility]    BENCH LIVER N/A 2019    Procedure: BACKBENCH PREPARATION, LIVER;  Surgeon: Mandeep Alford MD;  [facility]    BIOPSY  Bone marrow 2018    Liver ~ 2021    CHOLECYSTECTOMY  2019    Removed during liver transplant    COLONOSCOPY N/A 2018    Procedure: COLONOSCOPY;  colonoscopy;  Surgeon: Hugo Patel MD;  [facility]    ENDOSCOPIC RETROGRADE CHOLANGIOPANCREATOGRAM N/A 02/10/2020    Procedure: ENDOSCOPIC RETROGRADE CHOLANGIOPANCREATOGRAPHY with spinchterotomy;  Surgeon: Guru Rigoberto Canada MD;  [facility]    ENDOSCOPIC RETROGRADE CHOLANGIOPANCREATOGRAM  N/A 2020    Procedure: ENDOSCOPIC RETROGRADE CHOLANGIOPANCREATOGRAPHY,Stent exchange and sludge removal;  Surgeon: Guru Rigoberto Canada MD;  [facility]    ENDOSCOPIC RETROGRADE CHOLANGIOPANCREATOGRAM N/A 2021    Procedure: ENDOSCOPIC RETROGRADE CHOLANGIOPANCREATOGRAPHY, SPINCTEROTOMY, DEBRIDE REMOVAL, STENT PLACEMENT;  Surgeon: Guru Rigoberto Canada MD;  [facility]    ENDOSCOPIC RETROGRADE CHOLANGIOPANCREATOGRAPHY, EXCHANGE TUBE/STENT N/A 2020    Procedure: ENDOSCOPIC RETROGRADE CHOLANGIOPANCREATOGRAPHY, WITH biliary dilarion, stent exchange, stone removal;  Surgeon: Guru Rigoberto Canada MD;  [facility]    ENDOSCOPIC RETROGRADE CHOLANGIOPANCREATOGRAPHY, EXCHANGE TUBE/STENT N/A 2021    Procedure: ENDOSCOPIC RETROGRADE CHOLANGIOPANCREATOGRAPHY WITH BILIARY STENT EXCHANGE, DILATION AND SLUDGE/STONE REMOVAL;  Surgeon: Guru Rigoberto Canada MD;  [facility]    ESOPHAGOSCOPY, GASTROSCOPY, DUODENOSCOPY (EGD), COMBINED N/A 10/31/2019    Procedure: Esophagogastroduodenoscopy, With Biopsy;  Surgeon: Nerissa Aranda MD;  [facility]    IR FEEDING TUBE PLACEMENT W MOHAN/MD  2019    IR FLUORO 0-1 HOUR  2019    IR LIVER BIOPSY PERCUTANEOUS  2021    IR LUMBAR PUNCTURE  2019    IRRIGATION AND DEBRIDEMENT LOWER EXTREMITY, COMBINED Left 11/10/2021    Procedure: Irrigation and debridement Left knee skin subcutaneous tissue (length: 10cm), Application of wound vac;  Surgeon: Mario Wallace MD;  [facility]    KNEE SURGERY  10/23/2020    GA STOMACH SURGERY PROCEDURE UNLISTED  Liver transplant 19    TRANSPLANT LIVER RECIPIENT  DONOR N/A 2019    Procedure: TRANSPLANT, LIVER, RECIPIENT,  DONOR;  Surgeon: Mandeep Alford MD;  [facility]    US PARACENTESIS  2019    US PARACENTESIS WITH ALBUMIN  2019      Allergies   Allergen Reactions     Ciprofloxacin Dizziness and Nausea    Furosemide Rash and Swelling     Other reaction(s): rash  14  Other reaction(s): rash  14    Cefpodoxime Dizziness and Nausea     Lightheadedness as well     Food      Fruits with cores and pits  Apples    Linezolid Dizziness and Nausea     lightheadedness     Sulfa Antibiotics Other (See Comments) and Unknown     Unsure what reaction was     Sulfamethoxazole-Trimethoprim      Unsure what reaction was       Social History     Tobacco Use    Smoking status: Former     Current packs/day: 0.00     Average packs/day: 0.5 packs/day for 11.4 years (5.7 ttl pk-yrs)     Types: Cigarettes     Start date: 2000     Quit date: 10/3/2011     Years since quittin.7    Smokeless tobacco: Never   Substance Use Topics    Alcohol use: Not Currently     Comment: Former weekend social drinker;  no use since       Wt Readings from Last 1 Encounters:   24 101.6 kg (224 lb)        Anesthesia Evaluation   Pt has had prior anesthetic. Type: General.    No history of anesthetic complications       ROS/MED HX  ENT/Pulmonary:     (+)     DIXIE risk factors,   obese,        tobacco use, Past use,                       Neurologic:  - neg neurologic ROS     Cardiovascular:  - neg cardiovascular ROS     METS/Exercise Tolerance:     Hematologic: Comments: Thrombocytopenia    (+)       history of blood transfusion, previous transfusion reaction, Known PRBC Anitbodies: Yes, - Liver transplant,      Musculoskeletal: Comment: S/p right TKA 10/2020  (+)  arthritis,             GI/Hepatic: Comment: S/p liver transplant , s/p ERCP CBD stenosis      Renal/Genitourinary:     (+) renal disease, type: CRI, Pt does not require dialysis,           Endo:     (+)               Obesity,       Psychiatric/Substance Use:  - neg psychiatric ROS     Infectious Disease:  - neg infectious disease ROS     Malignancy:       Other:               OUTSIDE LABS:  CBC:   Lab Results   Component Value Date     WBC 9.7 07/13/2024    WBC 14.7 (H) 07/12/2024    HGB 11.1 (L) 07/13/2024    HGB 12.1 07/12/2024    HCT 33.0 (L) 07/13/2024    HCT 35.3 07/12/2024    PLT 89 (L) 07/13/2024    PLT 99 (L) 07/12/2024     BMP:   Lab Results   Component Value Date     (L) 07/12/2024     05/29/2024    POTASSIUM 3.6 07/12/2024    POTASSIUM 5.1 05/29/2024    CHLORIDE 98 07/12/2024    CHLORIDE 106 05/29/2024    CO2 25 07/12/2024    CO2 25 05/29/2024    BUN 30.5 (H) 07/12/2024    BUN 21.2 05/29/2024    CR 1.13 (H) 07/12/2024    CR 1.06 (H) 05/29/2024     (H) 07/12/2024    GLC 93 05/29/2024     COAGS:   Lab Results   Component Value Date    PTT 30 09/24/2021    INR 0.91 09/24/2021    FIBR 682 (H) 12/10/2020     POC:   Lab Results   Component Value Date    BGM 98 05/12/2021    HCGS Negative 06/18/2018     HEPATIC:   Lab Results   Component Value Date    ALBUMIN 3.5 07/12/2024    PROTTOTAL 6.6 07/12/2024    ALT 25 07/12/2024    AST 32 07/12/2024    ALKPHOS 98 07/12/2024    BILITOTAL 1.7 (H) 07/12/2024    DOROTHEA 23 09/04/2019     OTHER:   Lab Results   Component Value Date    PH 7.46 (H) 09/11/2019    LACT 1.7 07/12/2024    A1C 5.0 08/18/2019    JACOB 8.7 (L) 07/12/2024    PHOS 3.5 02/20/2024    MAG 1.7 02/20/2024    LIPASE 46 (L) 05/12/2021    AMYLASE 44 05/12/2021    TSH 0.56 04/24/2024    T4 0.70 (L) 09/11/2019    CRP 51.6 (H) 12/10/2020    SED 83 (H) 07/12/2024       Anesthesia Plan    ASA Status:  3       Anesthesia Type: General.     - Airway: LMA   Induction: Intravenous, Propofol.   Maintenance: Balanced.   Techniques and Equipment:       - Blood: T&S     Consents            Postoperative Care    Pain management: IV analgesics, Oral pain medications, Multi-modal analgesia.   PONV prophylaxis: Ondansetron (or other 5HT-3)     Comments:               Juan Diego Valenzuela MD    I have reviewed the pertinent notes and labs in the chart from the past 30 days and (re)examined the patient.  Any updates or changes from those notes are  "reflected in this note.     # Hyponatremia: Lowest Na = 133 mmol/L in last 30 days, will monitor as appropriate         # Thrombocytopenia: Lowest platelets = 89 in last 2 days, will monitor for bleeding  # Severe Obesity: Estimated body mass index is 40.97 kg/m  as calculated from the following:    Height as of this encounter: 1.575 m (5' 2\").    Weight as of this encounter: 101.6 kg (224 lb).      "

## 2024-07-13 NOTE — PHARMACY-VANCOMYCIN DOSING SERVICE
Pharmacy Vancomycin Initial Note  Date of Service 2024  Patient's  1960  64 year old, female    Indication: Bone and Joint Infection    Current estimated CrCl = Estimated Creatinine Clearance: 56.1 mL/min (A) (based on SCr of 1.13 mg/dL (H)).    Creatinine for last 3 days  2024: 10:03 AM Creatinine 1.13 mg/dL    Recent Vancomycin Level(s) for last 3 days  No results found for requested labs within last 3 days.      Vancomycin IV Administrations (past 72 hours)        No vancomycin orders with administrations in past 72 hours.                    Nephrotoxins and other renal medications (From now, onward)      Start     Dose/Rate Route Frequency Ordered Stop    24 0800  cycloSPORINE modified (GENERIC EQUIVALENT) capsule 75 mg         75 mg Oral DAILY 24  cycloSPORINE modified (GENERIC EQUIVALENT) capsule 50 mg         50 mg Oral EVERY EVENING 24              Contrast Orders - past 72 hours (72h ago, onward)      None            [employer] Prediction of Planned Initial Vancomycin Regimen  Loading dose: 2000 mg at 21:00 2024.  Regimen: 1000 mg IV every 24 hours.  Exposure target: AUC24 (range)400-600 mg/L.hr   AUC24,ss: 457 mg/L.hr  Probability of AUC24 > 400: 61 %  Ctrough,ss: 12.6 mg/L  Probability of Ctrough,ss > 20: 26 %  Probability of nephrotoxicity (Lodise SRIRAM ): 8 %        Plan:  Start vancomycin  2000 mg IV once (loading dose of roughly 20 mg/kg) followed by 1000 mg q24h. Given that her serum creatinine is on the higher side, will opt to dose more conservatively and get a level sooner to determine if we need to increase dose.   Vancomycin monitoring method: AUC  Vancomycin therapeutic monitoring goal: 400-600 mg*h/L  Pharmacy will check vancomycin levels as appropriate in 1-3 Days.    Serum creatinine levels will be ordered daily for the first week of therapy and at least twice weekly for subsequent weeks.      CHRISTA BRUCE, [facility]

## 2024-07-13 NOTE — OR NURSING
PACU to Inpatient Nursing Handoff    Patient Nicci Pemberton is a 64 year old female who speaks English.   Procedure Procedure(s):  Irrigation and debridement lower extremity, combined  Polyethylene exchange   Surgeon(s) Primary: Mina Black MD  Resident - Assisting: Brody, MD Amilcar     Allergies   Allergen Reactions    Ciprofloxacin Dizziness and Nausea    Furosemide Rash and Swelling     Other reaction(s): rash  8/14/14  Other reaction(s): rash  8/14/14    Cefpodoxime Dizziness and Nausea     Lightheadedness as well     Food      Fruits with cores and pits  Apples    Linezolid Dizziness and Nausea     lightheadedness     Sulfa Antibiotics Other (See Comments) and Unknown     Unsure what reaction was     Sulfamethoxazole-Trimethoprim      Unsure what reaction was        Isolation  [unfilled]     Past Medical History   has a past medical history of Anemia, Cellulitis, Cirrhosis of liver (H) (06/18/2018), Gastroesophageal reflux disease, Heterozygous alpha 1-antitrypsin deficiency (H), High hepatic iron concentration determined by biopsy of liver, Incisional infection (11/04/2021), Liver cirrhosis secondary to ANGULO (H), Liver transplanted (H) (08/18/2019), Lymphedema, Osteoarthritis, Other chronic pain, SBP (spontaneous bacterial peritonitis) (H) (08/02/2019), Thrombocytopenia (H24) (11/2020), and Thyroid disease.    Anesthesia General   Dermatome Level     Preop Meds acetaminophen (Tylenol) - time given: 0729   Nerve block Not applicable   Intraop Meds fentanyl (Sublimaze): 150 mcg total  ketamine (Ketalar): 51 mg given  Karthik gtt, TXA, Emend 150 mg IV   Local Meds No   Antibiotics cefazolin (Ancef) - last given at 0917     Pain Patient Currently in Pain: yes   PACU meds  fentanyl (Sublimaze): 100 mcg (total dose) last given at 1142   hydromorphone (Dilaudid): .4 mg (total dose) last given at 1228   oxycodone (Roxicodone): 5 mg (total dose) last given at 1305    PCA / epidural Ketamine gtt   Capnography      Telemetry     Inpatient Telemetry Monitor Ordered? No        Labs Glucose Lab Results   Component Value Date    GLC 92 07/13/2024    GLC 90 07/06/2022    GLC 87 07/09/2021       Hgb Lab Results   Component Value Date    HGB 11.1 07/13/2024    HGB 12.6 07/09/2021       INR Lab Results   Component Value Date    INR 0.91 09/24/2021    INR 0.91 05/12/2021      PACU Imaging Completed     Wound/Incision Negative Pressure Wound Therapy Knee Left (Active)   Number of days: 976       Incision/Surgical Site 09/24/21 Left Knee (Active)   Number of days: 1023       Incision/Surgical Site 07/13/24 Right;Midline Knee (Active)   Incision Assessment WDL 07/13/24 1047   Closure Sutures 07/13/24 1047   Dressing Intervention Clean, dry, intact;New dressing applied 07/13/24 1047   Number of days: 0      CMS        Equipment ice pack   Other LDA       IV Access Peripheral IV 07/12/24 Left Antecubital fossa (Active)   Site Assessment [facility] 07/13/24 0720   Line Status Infusing 07/13/24 0720   Dressing Transparent 07/13/24 0720   Dressing Status clean;dry;intact 07/13/24 0720   Dressing Intervention New dressing  07/12/24 1002   Line Intervention Flushed 07/12/24 1640   Phlebitis Scale 0-->no symptoms 07/13/24 0720   Infiltration? no 07/12/24 2200   Number of days: 1       Peripheral IV 07/12/24 Right;Posterior Lower forearm (Active)   Site Assessment [facility] 07/13/24 0720   Line Status Saline locked 07/13/24 0720   Dressing Transparent 07/12/24 2200   Dressing Status clean;dry;intact 07/13/24 0720   Dressing Intervention New dressing  07/12/24 1858   Line Intervention Cap change;Flushed 07/12/24 1858   Phlebitis Scale 0-->no symptoms 07/12/24 2200   Infiltration? no 07/12/24 2200   Number of days: 1      Blood Products Not applicable  mL   Intake/Output    Drains / Forrester Negative Pressure Wound Therapy Knee Left (Active)   Number of days: 976      Time of void PreOp Time of Void Prior to Procedure: 0536 (on purewick) (07/13/24 0534)     PostOp Voided (mL): 350 mL (07/13/24 0654)  Urine Occurrence: 1 (07/12/24 2200)    Diapered? No-uses purewick   Bladder Scan     PO    crackers and water     Vitals    B/P: 101/61  T: 98.2  F (36.8  C)    Temp src: Oral  P:  Pulse: 82 (07/13/24 0729)          R: 18  O2:  SpO2: 97 %    O2 Device: None (Room air) (07/13/24 0729)              Family/support present  Hugo   Patient belongings     Patient transported on bed   DC meds/scripts (obs/outpt) Not applicable   Inpatient Pain Meds Released? No       Special needs/considerations None   Tasks needing completion None       Isela Gaming, RN

## 2024-07-13 NOTE — PROGRESS NOTES
[facility]    Medicine Progress Note - Hospitalist Service, GOLD TEAM 17    Date of Admission:  7/12/2024    Assessment & Plan       Nicci Pemberton is a 64 year old female with a past medical history significant for liver cirrhosis 2/2NASH s/p liver transplant (8/2019), immunosuppression on cyclosporine, obesity, thrombocytopenia, degenerative joint disease s/p right RKA 10/2020, left TKA 9/2021 c/b superficial wound infection 11/2021 (cultures grew Enterococcus faecalis, Citrobacter and strep mitis, Srep Oralis),  hypothyroidism, former smoker (quit 2011), b/l lower extremity lymphedema, common bile duct stenosis treated with ERCP and stenting, CKD and GERD who presents with a 2 day hx of worsening right knee pain. Labs notable for leukocytosis of 14.7 with left shift. . SED 83. Right knee XR showed a moderate effusion with no evidence of malalignment, hardware loosening, or acute fracture.    Today's changes: 7/13/2024  Sp  Irrigation and debridement lower extremity, combined, Right - Leg; Polyethylene exchange, Right - Knee.by Dr Black. EBL: 150 ml.  Complications: None  -- Patient seen after the procedure.  Endorsing uncontrolled incisional pain otherwise no acute medical concern.  -- Pain management coordination with orthopedic surgery.  On scheduled Tylenol, adjusted oxycodone 5 to 10 mg every 3 hours as needed, IV Dilaudid to 0.5 every 2 hour as needed, continue current ketamine drip, increase gabapentin to 200 mg 3 times daily, add Robaxin 500 every 6 hours as needed.  -- Further per orthopedic surgery, infectious disease.  Continue current antibiotics.  Follow-up cultures       #1 Right knee pain, concern for prosthetic knee infection  Orthopedic surgery consulted. Right knee aspirated with cloudy aspirate. Cell count with differential and aerobic/anaerobic cultures obtained. Sp I and D as above 7/13.   Post Op Mx per Ortho.   Resume regular diet.   PT/OT.  "Activity per ortho.   DVT PPx per Ortho.   Infectious disease consulted, appreciate input.  Antibiotics plan per infectious disease.  Continue IV vancomycin, IV ceftriaxone.  Follow-up IntraOp cultures.  Check A1c.    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  Weight as of this encounter: 101.6 kg (224 lb).                   Diet: Advance Diet as Tolerated: Regular Diet Adult    DVT Prophylaxis: Defer to orthopedic surgery  Forrester Catheter: Not present  Lines: None     Cardiac Monitoring: None  Code Status: Full Code      Clinically Significant Risk Factors          # Hypocalcemia: Lowest Ca = 8.4 mg/dL in last 2 days, will monitor and replace as appropriate       # Thrombocytopenia: Lowest platelets = 89 in last 2 days, will monitor for bleeding   # Hypertension: Noted on problem list              # Severe Obesity: Estimated body mass index is 40.97 kg/m  as calculated from the following:    Height as of this encounter: 1.575 m (5' 2\").    Weight as of this encounter: 101.6 kg (224 lb)., PRESENT ON ADMISSION            Disposition Plan     Medically Ready for Discharge: Anticipated in 2-4 Days             Sterling Meier MD  Hospitalist Service, GOLD TEAM [address] [place] [address]  Securely message with [employer] (more info)  Text page via [employer] Paging/Directory   See signed in " provider for up to date coverage information  ______________________________________________________________________    Interval History     Interval events reviewed.  Seen after the OR.  Incisional pain present.  No acute medical concern.  No nausea vomiting or pain abdomen or chest pain or cough or shortness of breath.  No other concern    Physical Exam   Vital Signs: Temp: 98.2  F (36.8  C) Temp src: Oral BP: 128/64 Pulse: 71   Resp: 18 SpO2: 98 % O2 Device: Nasal cannula Oxygen Delivery: 3 LPM  Weight: 224 lbs 0 oz      General Appearance: Awake, interactive, NAD  Respiratory: Normal work of breathing. NC oxygen.   Cardiovascular: RRR  GI: Soft. NT. ND.  Extremities: Distally wwp. No pedal edema RLE on dressing.   Skin: No acute rash on exposed areas.   Neuro: Grossly non focal.   Others: Stable mood.      Medical Decision Making       51 MINUTES SPENT BY ME on the date of service doing chart review, history, exam, documentation & further activities per the note.      Data     I have personally reviewed the following data over the past 24 hrs:    9.7  \   11.1 (L)   / 89 (L)     138 107 22.5 /  92   4.0 22 0.89 \     TSH: N/A T4: N/A A1C: 5.0       Imaging results reviewed over the past 24 hrs:   Recent Results (from the past 24 hour(s))   XR Knee Port Right 1/2 Views    Narrative    EXAM: XR KNEE PORT RIGHT 1/2 VIEWS  LOCATION: [facility]  DATE: 7/13/2024    INDICATION: Post Op Total Knee  COMPARISON: None.      Impression    IMPRESSION: Postoperative changes right total knee arthroplasty and patellar resurfacing. The components appear well seated. Soft tissue swelling about the knee joint but no significant intra-articular effusion.     Recent Labs   Lab 07/13/24  0620 07/12/24  1003   WBC 9.7 14.7*   HGB 11.1* 12.1   * 99   PLT 89* 99*    133*   POTASSIUM 4.0 3.6   CHLORIDE 107 98   CO2 22 25   BUN 22.5 30.5*   CR 0.89 1.13*   ANIONGAP 9 10   JACOB 8.4*  8.7*   GLC 92 156*   ALBUMIN  --  3.5   PROTTOTAL  --  6.6   BILITOTAL  --  1.7*   ALKPHOS  --  98   ALT  --  25   AST  --  32

## 2024-07-13 NOTE — PLAN OF CARE
Goal Outcome Evaluation:      Plan of Care Reviewed With: patient    Overall Patient Progress: no changeOverall Patient Progress: no change           VS: VSS. Denies SOB and chest pain.   O2: SpO2 > 90% at room air.    Output: Has patent purewick in place. Patient in a lot of pain to get up and use commode.    Last BM: 07/11/24. Passing gas    Activity: Bedrest. BLE elevated in pillow    Skin: R knee  swelling and warm to touch.   Old scar on bilateral knee and abdomen (from liver transplant)    Pain: Manage with PRN oxycodone and cont. Ketamine 10mg/hr; 5ml/hr   CMS: CMS and neuro intact to baseline.   Denies Numbness and tingling    Dressing: None    Diet: NPO midnight    LDA: PIV on R saline locked.   PIV on L on cont Ketamine infusion.    Equipment: IV pole and personal belongings    Plan: For I&D procedure 07/13/24   Additional Info: 2 CHG wipes done this shift.   Consent for procedure and blood signed electronically by Pt, MD and witness RN.

## 2024-07-13 NOTE — BRIEF OP NOTE
[facility]    Brief Operative Note    Pre-operative diagnosis: Prosthetic joint infection (H24) [T84.50XA]  Post-operative diagnosis Same as pre-operative diagnosis    Procedure: Irrigation and debridement lower extremity, combined, Right - Leg  Polyethylene exchange, Right - Knee    Surgeon: Surgeons and Role:     * Mina Black MD - Primary     * Only, MD Amilcar - Resident - Assisting  Anesthesia: General   Estimated Blood Loss: 150 mL from 7/13/2024  8:09 AM to 7/13/2024 11:18 AM      Drains: None  Specimens:   ID Type Source Tests Collected by Time Destination   A : Right Knee Synovial fluid Synovial fluid Knee, Right AFB CULTURE AND STAIN NON BLOOD, ANAEROBIC BACTERIAL CULTURE ROUTINE, GRAM STAIN, FUNGAL OR YEAST CULTURE ROUTINE, AEROBIC BACTERIAL CULTURE ROUTINE Mina Black MD 7/13/2024  9:13 AM    B : Right Knee Synovium Tissue Knee, Right AFB CULTURE AND STAIN NON BLOOD, ANAEROBIC BACTERIAL CULTURE ROUTINE, GRAM STAIN, FUNGAL OR YEAST CULTURE ROUTINE, AEROBIC BACTERIAL CULTURE ROUTINE Mina Black MD 7/13/2024  9:35 AM    C : Right Knee Posterior Capsule Tissue Knee, Right AFB CULTURE AND STAIN NON BLOOD, ANAEROBIC BACTERIAL CULTURE ROUTINE, GRAM STAIN, FUNGAL OR YEAST CULTURE ROUTINE, AEROBIC BACTERIAL CULTURE ROUTINE Mina Black MD 7/13/2024  9:39 AM      Findings:   Cloudy synovial fluid  Complications: None.  Implants:   Implant Name Type Inv. Item Serial No.  Lot No. LRB No. Used Action   IMP TIBIAL BEARING CR-LIPPED VANGUARD BIOM 40UDB15/75 [id number] - [id number] Total Joint Component/Insert IMP TIBIAL BEARING CR-LIPPED VANGUARD BIOM 25MJA85/75 [id number]  [employer] [id number] Left 1 Explanted   IMP COMP TIBIAL KNEE TRAY ASCENT LOCKING BAR [id number] - [id number] Total Joint Component/Insert IMP COMP TIBIAL KNEE TRAY ASCENT LOCKING BAR [id number]  [employer] [id number] Left 1 Implanted   IMP TIBIAL BEARING CR-LIPPED VANGUARD BIOM 12X71/75  [id number] - [id number] Total Joint Component/Insert IMP TIBIAL BEARING CR-LIPPED VANGUARD BIOM 12X71/75 [id number]  [employer] [id number] Left 1 Implanted         POST OPERATIVE PLAN    Assessment/Plan: Nicci Pemberton is a right a right knee prosthetic joint infection status post irritation and debridement with a poly exchanged on 7/13/2024 with Dr. Black    Activity: Up with assist  Weight bearing status: WBAT on the RLE   Antibiotics:  Broad spectrum until cultures  Diet: Regular. Bowel regimen. Anti-emetics PRN.    DVT prophylaxis: Mechanical +  mgstarting PODElevation: Elevate heels off of bed on pillows   Wound Care: Keep dressings in place until POD3  Drains: none  Pain management: Orals PRN, IV for breakthrough only  X-rays: AP and lateral Xrs of the knee in pacu  Physical Therapy: Mobilization, ROM, ADL's  Occupational Therapy: ADL's  Labs: Trend Hgb on POD #1  Consults: PT, OT, infectious disease  Disposition: TBD    Future Appointments   Date Time Provider Department Center   7/14/2024  8:15 AM Jennifer Isaacs, PT [facility] [place]   7/14/2024  9:00 AM Tg Mendes OTR UROT [place]   7/15/2024  9:20 AM Mario Wallace MD [facility]   7/23/2024 11:00 AM [facility]   7/29/2024  8:00 AM Darron Andujar [facility]   9/25/2024 10:45 AM Marilynn Thomas MD [facility]   2/17/2025 11:00 AM Leventhal, Thomas Michael, MD [facility]         Evert Saucedo MD  Orthopaedic Surgery, PGY4

## 2024-07-13 NOTE — CONSULTS
[facility] ID SERVICE : NEW CONSULTATION    Patient:  Nicci Pemberton, Date of birth 1960, Medical record number [id number]  Date of Admission: 7/12/2024  Date of Visit:  7/13/2024  Requesting Provider: Fab White         Assessment and Recommendations:     ID Problem List:  Right knee PJI s/p I&D, polyethylene liner exchange 7/13/2024 7/13/2024 - pending intra-op cultures  7/12/2024 synovial fluid analysis TNC 27315, 96% neutrophils. 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 Reviewed OP findings - Cloudy synovial fluid. Multiple intra-op cultures are obtained. Recommend to continue empiric antibiotics, will tailor regimen accordingly.    Recommendations:  Continue empiric antibiotics:  IV vancomycin, -600. Pharmacy to dose/monitor, appreciate assistance.  Continue iv ceftriaxone 2g daily  Follow up intra-op cultures. Will tailor antibiotics regimen accordingly.   Await formal OP note.   Check A1c (ordered).     Recommendations discussed with primary team.    Thank you for this consult. ID team will continue to follow this patient. Please reach out if any questions or concerns.     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     Evaluated by Orthopedic team, had arthrocentesis yielding ~60cc of  very cloudy yellow synovial fluid. Fluid analysis showed 916 TNC, neutrophilic predominance. Gram stain showed GPC, pending culture. Underwent I&D, polyethylene liner exchange today. OP findings showed cloudy synovial fluid. Multiple intra-op cultures were obtained.     Patient was seen and examined at bedside in PACU.   As history provided by patient, does have intermittent right knee pain which attributes to underlying TKR. Though started excruciating pain w/o direct trauma/injury/falls about 3 days prior to admission. In addition, noted mild redness, warmth, swelling of right knee, with progressively worsening of symptoms that led to ER visit and admission. Also recalls, subjective fevers, shaking along with it.     Regarding history of left TKR (Sept 2021) and superficial infection s/p I&D (11/2021):  Cultures with polymicrobial growth: aerobic (Enterococcus faecalis (S ampicillin, vancomycin), Citrobacter koseri, MRSE, Str oralis), anaerobic (Anaerococcus spp, Peptoniphilus asaccharolyticus). Received multiple rotating antibiotics per orthopedic and never evaluated by ID at that time. Antibiotic history per documentation by Orthopedic consultation 7/12/2024: \"Keflex x 2 days switched to: Linezolid 600 mg p.o. twice daily x10 days, Vantin 200 mg p.o. twice daily x10 days. Then Vantin 200mg PO BID every day x 10days, linezolid 600mg PO BID daily x " "10 days.\"    Former smoker, quit in . No diabetes history, last A1c .          Review of Systems:     Complete 12 point review of systems negative except as noted in HPI.          Antimicrobial history:     Current:  Vancomycin, ceftriaxone  - present       Past Medical History:     Past Medical History:   Diagnosis Date    Anemia     Cellulitis     Cirrhosis of liver (H) 2018    cirrhosis secondary to combination of nonalcoholic fatty liver disease, iron overload, and alpha-1 antitrypsin MZ phenotype who is s/p  donor liver transplant on 19    Gastroesophageal reflux disease     Heterozygous alpha 1-antitrypsin deficiency (H)     High hepatic iron concentration determined by biopsy of liver     Incisional infection 2021    Left TKA incision    Liver cirrhosis secondary to ANGULO (H)     Liver transplanted (H) 2019    Lymphedema     Osteoarthritis     knees    Other chronic pain     Left knee    SBP (spontaneous bacterial peritonitis) (H) 2019 in [place]    Thrombocytopenia (H24) 2020    Thyroid disease     Hypothyroid         Allergies:      Allergies   Allergen Reactions    Ciprofloxacin Dizziness and Nausea    Furosemide Rash and Swelling     Other reaction(s): rash  14  Other reaction(s): rash  14    Cefpodoxime Dizziness and Nausea     Lightheadedness as well     Food      Fruits with cores and pits  Apples    Linezolid Dizziness and Nausea     lightheadedness     Sulfa Antibiotics Other (See Comments) and Unknown     Unsure what reaction was     Sulfamethoxazole-Trimethoprim      Unsure what reaction was           Family History:   Reviewed and noncontributory.   Family History   Problem Relation Age of Onset    Diabetes Maternal Grandfather         Diagnosed at age 83    Mental Illness Maternal Grandfather         Alzheimers    Hypertension Mother     Osteoporosis Mother     Restless Leg Syndrome Father     Mental Illness Father         Arnie " Body Disease    Mental Illness Sister         Borderline Schizophrenia    Alcoholism Brother     Diabetes Brother         Diagnosed at age 63    Hyperlipidemia Brother     Obesity Brother     No Known Problems Son     Heart Failure Paternal Grandmother     No Known Problems Son     No Known Problems Maternal Grandmother     Breast Cancer Paternal Aunt     Breast Cancer Other         Maternal Aunt (diagnosed age 50+)    Breast Cancer Other         Paternal Aunt (diagnosed age 50+)    Cirrhosis No family hx of     Liver Cancer No family hx of           Social History:     Social History     Socioeconomic History    Marital status:      Spouse name: Not on file    Number of children: Not on file    Years of education: Not on file    Highest education level: Not on file   Occupational History    Not on file   Tobacco Use    Smoking status: Former     Current packs/day: 0.00     Average packs/day: 0.5 packs/day for 11.4 years (5.7 ttl pk-yrs)     Types: Cigarettes     Start date: 2000     Quit date: 10/3/2011     Years since quittin.7    Smokeless tobacco: Never   Substance and Sexual Activity    Alcohol use: Not Currently     Comment: Former weekend social drinker;  no use since     Drug use: Not Currently     Types: Marijuana     Comment: Teen years    Sexual activity: Yes     Partners: Male     Birth control/protection: Post-menopausal   Other Topics Concern    Parent/sibling w/ CABG, MI or angioplasty before 65F 55M? Not Asked   Social History Narrative    Not on file     Social Determinants of Health     Financial Resource Strain: Not on file   Food Insecurity: Not on file   Transportation Needs: Not on file   Physical Activity: Not on file   Stress: Not on file   Social Connections: Not on file   Interpersonal Safety: Not on file   Housing Stability: Not on file              Physical Examination:     Vital signs:  /61   Pulse 82   Temp 98.2  F (36.8  C) (Oral)   Resp 18   Ht 1.575 m  "(5' 2\")   Wt 101.6 kg (224 lb)   SpO2 97%   BMI 40.97 kg/m      GENERAL: alert and no distress  EYES: Eyes grossly normal to inspection, PERRL and conjunctivae and sclerae normal  HENT: ear canals and TM's normal, nose and mouth without ulcers or lesions  NECK: no adenopathy, no asymmetry, masses, or scars  RESP: lungs clear to auscultation - no rales, rhonchi or wheezes  CV: regular rate and rhythm, normal S1 S2, no S3 or S4, no murmur, click or rub, no peripheral edema  ABDOMEN: soft, nontender, no hepatosplenomegaly, no masses and bowel sounds normal  MS: Right knee s/p surgery, covered in ace wrap.   SKIN: no suspicious lesions or rashes  NEURO: Normal strength and tone, mentation intact and speech normal  PSYCH: mentation appears normal, affect normal/bright      Lines and devices:    PIV CDI, non-tender, no surrounding erythema.    Labs, Microbiology and Imaging studies reviewed.   Elevated CRP         Laboratory Data:       Microbiology:    Culture   Date Value Ref Range Status   07/12/2024 No growth after 12 hours  Preliminary   07/12/2024 No growth after 12 hours  Preliminary   11/10/2021 4+ Anaerococcus species (A)  Final     Comment:     Susceptibilities done on previous cultures   11/10/2021 2+ Peptoniphilus asaccharolyticus (A)  Final   11/10/2021 No Growth  Final   11/10/2021 No Growth  Final   11/10/2021 4+ Citrobacter koseri (A)  Final     Comment:     Susceptibilities done on previous cultures   11/10/2021 4+ Enterococcus faecalis (A)  Final     Comment:     Susceptibilities done on previous cultures   11/10/2021 4+ Streptococcus oralis (A)  Final     Comment:     Susceptibilities done on previous cultures   11/10/2021 4+ Citrobacter koseri (A)  Final     Comment:     Susceptibilities done on previous cultures   11/10/2021 Isolated in broth only Enterococcus faecalis (A)  Final     Comment:     On day 1 of incubation  Susceptibilities done on previous cultures   11/10/2021 4+ Anaerococcus species " (A)  Final   11/10/2021 No Growth  Final   11/10/2021 4+ Citrobacter koseri (A)  Final   11/10/2021 4+ Enterococcus faecalis (A)  Final   11/10/2021 4+ Staphylococcus epidermidis (A)  Final   11/10/2021 3+ Streptococcus oralis (A)  Final   10/13/2020 No Growth  Final   08/05/2019 No Growth  Final   08/02/2019   Final    No Salmonella, Shigella, Yersinia, or Campylobacter.   07/31/2019 No Growth  Final   04/14/2019 Mixture of urogenital organisms  Final   05/04/2018 CITROBACTER KOSERI (A)  Final     Comment:     >100,000 col/ml Citrobacter koseri     Inflammatory Markers:   Recent Labs   Lab Test 07/12/24  1003 12/10/20  0844 12/09/20  0637 12/08/20  0511 12/07/20  0559 12/06/20  0504 10/30/20  0649 10/28/20  1603 06/18/18  1024   SED 83*  --   --   --   --   --   --  100* 17   CRP  --  51.6* 87.0* 165.0* 210.0* 119.0* 54.7* 108.0* 8.9*     Urine Studies:    Recent Labs   Lab Test 02/20/24  1043 02/06/23  1041 01/13/22  1129 05/04/21  1115 12/05/20  1826 09/21/20  0947 08/27/20  0940   LEUKEST Trace* Negative Negative Trace* Negative Moderate* Trace*   WBCU 0-5 0-5  --  3  --  * 3     Hematology Studies:    Recent Labs   Lab Test 07/13/24  0620 07/12/24  1003 05/29/24  1030 02/20/24  1028 11/28/23  1613 11/07/23  1032 04/06/21  1018 03/29/21  1040 03/27/21  0934 03/25/21  1325 01/07/21  1146 12/29/20  1108 12/11/20  0523 12/10/20  0844 12/09/20  0637   WBC 9.7 14.7* 5.2 4.5 6.0 5.0   < > 18.4* 4.7 5.4   < > 4.6   < > 5.7 5.9   ANEU  --   --   --   --   --   --   --  16.0* 3.1 3.2  --  3.0  --  4.1 3.9   AEOS  --   --   --   --   --   --   --  0.0 0.2 0.2  --  0.2  --  0.1 0.2   HGB 11.1* 12.1 13.3 13.2 13.1 13.2   < > 12.3 12.2 13.2   < > 12.0   < > 11.4* 10.6*   * 99 100 97 101* 101*   < > 98 97 97   < > 99   < > 99 99   PLT 89* 99* 153 148* 156 159   < > 155 127* 142*   < > 147*   < > 117* 106*    < > = values in this interval not displayed.      Metabolic Studies:   Recent Labs   Lab Test  07/13/24  0620 07/12/24  1003 05/29/24  1030 05/06/24  1034 04/02/24  1027    133* 143 143 141   POTASSIUM 4.0 3.6 5.1 4.9 5.4*   CHLORIDE 107 98 106 108* 106   CO2 22 25 25 25 27   BUN 22.5 30.5* 21.2 23.4* 23.4*   CR 0.89 1.13* 1.06* 1.08* 1.06*   GFRESTIMATED 72 54* 59* 57* 59*     Hepatic Studies:   Recent Labs   Lab Test 07/12/24  1003 05/29/24  1030 05/06/24  1034 04/02/24  1027 03/27/24  1321 03/19/24  1013   BILITOTAL 1.7* 1.2 1.7* 1.5* 2.2* 1.8*   ALKPHOS 98 96 94 90 93 88   ALBUMIN 3.5 4.0 3.9 4.1 4.1 4.1   AST 32 37 28 33 35 29   ALT 25 21 16 19 18 18            Last check of C difficile  C Diff Toxin B PCR   Date Value Ref Range Status   09/16/2019 Positive (A) NEG^Negative Final     Comment:     Positive: Toxin producing Clostridium difficile target DNA sequences detected,   presumed positive for Clostridium difficile toxin B.  Clostridium difficile (Requires Enteric Isolation)  FDA approved assay performed using [employer] GeneXpert real-time PCR.  Called to  Cinthia Browne RN [id number] at 1236 on 9.16.19 rd.         CSF testing     Recent Labs   Lab Test 09/05/19  1549 09/05/19  1545   CWBC Test not performed. Criteria not met for second cell count. 1   CRBC Test not performed. Criteria not met for second cell count. 12*   CGLU  --  51   CTP  --  44       Last Pathology Report   Case Report   Date Value Ref Range Status   09/12/2023   Final    Surgical Pathology Report                         Case: [id number]                                  Authorizing Provider:  Marilynn Thomas MD      Collected:           09/12/2023 11:31 AM          Ordering Location:     [facility]          Received:            09/12/2023 12:34 PM                                 [facility]                                                                                  [facility]                                                                  Pathologist:           William Mccoy MD                                                       Specimens:   A) - Skin, Central lower back                                                                       B) - Skin, Right medial buttocks                                                            Clinical Information   Date Value Ref Range Status   2023   Final    post-menopausal       Final Diagnosis   Date Value Ref Range Status   2023   Final    A(1). Skin, Central lower back, shave:  - Intradermal melanocytic nevus - (see description)     B(2). Skin, Right medial buttocks, shave:  - Nevus lipomatosus superficialis - (see description)                  Imagin2024 XR Right knee:  IMPRESSION: Right total knee arthroplasty, satisfactory alignment. No signs of loosening. No acute fracture. Moderate joint effusion.    2024 US RLE:  IMPRESSION:  Negative for deep vein thrombosis.

## 2024-07-14 ENCOUNTER — APPOINTMENT (OUTPATIENT)
Dept: OCCUPATIONAL THERAPY | Facility: CLINIC | Age: 64
DRG: 486 | End: 2024-07-14
Attending: PHYSICIAN ASSISTANT
Payer: COMMERCIAL

## 2024-07-14 ENCOUNTER — APPOINTMENT (OUTPATIENT)
Dept: PHYSICAL THERAPY | Facility: CLINIC | Age: 64
DRG: 486 | End: 2024-07-14
Payer: COMMERCIAL

## 2024-07-14 LAB
CREAT SERPL-MCNC: 0.91 MG/DL (ref 0.51–0.95)
EGFRCR SERPLBLD CKD-EPI 2021: 70 ML/MIN/1.73M2
GLUCOSE BLDC GLUCOMTR-MCNC: 86 MG/DL (ref 70–99)
HGB BLD-MCNC: 11.6 G/DL (ref 11.7–15.7)

## 2024-07-14 PROCEDURE — 250N000011 HC RX IP 250 OP 636: Performed by: PHYSICIAN ASSISTANT

## 2024-07-14 PROCEDURE — 250N000011 HC RX IP 250 OP 636: Performed by: INTERNAL MEDICINE

## 2024-07-14 PROCEDURE — 99233 SBSQ HOSP IP/OBS HIGH 50: CPT | Performed by: STUDENT IN AN ORGANIZED HEALTH CARE EDUCATION/TRAINING PROGRAM

## 2024-07-14 PROCEDURE — 250N000013 HC RX MED GY IP 250 OP 250 PS 637: Performed by: PHYSICIAN ASSISTANT

## 2024-07-14 PROCEDURE — 97166 OT EVAL MOD COMPLEX 45 MIN: CPT | Mod: GO

## 2024-07-14 PROCEDURE — 250N000011 HC RX IP 250 OP 636

## 2024-07-14 PROCEDURE — 87641 MR-STAPH DNA AMP PROBE: CPT | Performed by: INTERNAL MEDICINE

## 2024-07-14 PROCEDURE — 250N000013 HC RX MED GY IP 250 OP 250 PS 637: Performed by: INTERNAL MEDICINE

## 2024-07-14 PROCEDURE — 120N000002 HC R&B MED SURG/OB UMMC

## 2024-07-14 PROCEDURE — 97110 THERAPEUTIC EXERCISES: CPT | Mod: GP

## 2024-07-14 PROCEDURE — 97535 SELF CARE MNGMENT TRAINING: CPT | Mod: GO

## 2024-07-14 PROCEDURE — 250N000013 HC RX MED GY IP 250 OP 250 PS 637

## 2024-07-14 PROCEDURE — 82565 ASSAY OF CREATININE: CPT | Performed by: INTERNAL MEDICINE

## 2024-07-14 PROCEDURE — 87640 STAPH A DNA AMP PROBE: CPT | Performed by: INTERNAL MEDICINE

## 2024-07-14 PROCEDURE — 85018 HEMOGLOBIN: CPT

## 2024-07-14 PROCEDURE — 97161 PT EVAL LOW COMPLEX 20 MIN: CPT | Mod: GP

## 2024-07-14 PROCEDURE — 97530 THERAPEUTIC ACTIVITIES: CPT | Mod: GP

## 2024-07-14 PROCEDURE — 36415 COLL VENOUS BLD VENIPUNCTURE: CPT | Performed by: INTERNAL MEDICINE

## 2024-07-14 PROCEDURE — 250N000012 HC RX MED GY IP 250 OP 636 PS 637: Performed by: PHYSICIAN ASSISTANT

## 2024-07-14 PROCEDURE — 99232 SBSQ HOSP IP/OBS MODERATE 35: CPT | Performed by: INTERNAL MEDICINE

## 2024-07-14 RX ADMIN — ASPIRIN 81 MG CHEWABLE TABLET 162 MG: 81 TABLET CHEWABLE at 08:01

## 2024-07-14 RX ADMIN — CYCLOSPORINE 75 MG: 25 CAPSULE, LIQUID FILLED ORAL at 08:03

## 2024-07-14 RX ADMIN — GABAPENTIN 200 MG: 100 CAPSULE ORAL at 14:06

## 2024-07-14 RX ADMIN — METHOCARBAMOL 500 MG: 500 TABLET ORAL at 20:48

## 2024-07-14 RX ADMIN — ONDANSETRON 4 MG: 4 TABLET, ORALLY DISINTEGRATING ORAL at 14:26

## 2024-07-14 RX ADMIN — ONDANSETRON 4 MG: 2 INJECTION INTRAMUSCULAR; INTRAVENOUS at 23:29

## 2024-07-14 RX ADMIN — CEFTRIAXONE 2 G: 2 INJECTION, POWDER, FOR SOLUTION INTRAMUSCULAR; INTRAVENOUS at 18:14

## 2024-07-14 RX ADMIN — GABAPENTIN 200 MG: 100 CAPSULE ORAL at 08:03

## 2024-07-14 RX ADMIN — OXYCODONE HYDROCHLORIDE 5 MG: 5 TABLET ORAL at 22:00

## 2024-07-14 RX ADMIN — OXYCODONE HYDROCHLORIDE 10 MG: 5 TABLET ORAL at 08:02

## 2024-07-14 RX ADMIN — METHOCARBAMOL 500 MG: 500 TABLET ORAL at 12:47

## 2024-07-14 RX ADMIN — CYCLOSPORINE 50 MG: 50 CAPSULE, LIQUID FILLED ORAL at 20:49

## 2024-07-14 RX ADMIN — ACETAMINOPHEN 975 MG: 325 TABLET, FILM COATED ORAL at 22:00

## 2024-07-14 RX ADMIN — Medication 112.5 MCG: at 08:02

## 2024-07-14 RX ADMIN — HYDROMORPHONE HYDROCHLORIDE 0.5 MG: 1 INJECTION, SOLUTION INTRAMUSCULAR; INTRAVENOUS; SUBCUTANEOUS at 23:39

## 2024-07-14 RX ADMIN — ROSUVASTATIN CALCIUM 5 MG: 5 TABLET, COATED ORAL at 20:49

## 2024-07-14 RX ADMIN — METHOCARBAMOL 500 MG: 500 TABLET ORAL at 17:07

## 2024-07-14 RX ADMIN — ACETAMINOPHEN 975 MG: 325 TABLET, FILM COATED ORAL at 06:09

## 2024-07-14 RX ADMIN — POLYETHYLENE GLYCOL 3350 17 G: 17 POWDER, FOR SOLUTION ORAL at 08:01

## 2024-07-14 RX ADMIN — METHOCARBAMOL 500 MG: 500 TABLET ORAL at 08:03

## 2024-07-14 RX ADMIN — SENNOSIDES AND DOCUSATE SODIUM 2 TABLET: 50; 8.6 TABLET ORAL at 20:48

## 2024-07-14 RX ADMIN — FAMOTIDINE 20 MG: 20 TABLET ORAL at 20:49

## 2024-07-14 RX ADMIN — GABAPENTIN 200 MG: 100 CAPSULE ORAL at 20:48

## 2024-07-14 RX ADMIN — ACETAMINOPHEN 975 MG: 325 TABLET, FILM COATED ORAL at 14:06

## 2024-07-14 RX ADMIN — SENNOSIDES AND DOCUSATE SODIUM 2 TABLET: 50; 8.6 TABLET ORAL at 08:02

## 2024-07-14 RX ADMIN — HYDROMORPHONE HYDROCHLORIDE 0.5 MG: 1 INJECTION, SOLUTION INTRAMUSCULAR; INTRAVENOUS; SUBCUTANEOUS at 14:26

## 2024-07-14 RX ADMIN — VANCOMYCIN HYDROCHLORIDE 1000 MG: 1 INJECTION, SOLUTION INTRAVENOUS at 21:10

## 2024-07-14 ASSESSMENT — ACTIVITIES OF DAILY LIVING (ADL)
ADLS_ACUITY_SCORE: 46
ADLS_ACUITY_SCORE: 42
ADLS_ACUITY_SCORE: 42
ADLS_ACUITY_SCORE: 46
ADLS_ACUITY_SCORE: 42
ADLS_ACUITY_SCORE: 46
ADLS_ACUITY_SCORE: 42
ADLS_ACUITY_SCORE: 47
ADLS_ACUITY_SCORE: 47
ADLS_ACUITY_SCORE: 42
ADLS_ACUITY_SCORE: 47
ADLS_ACUITY_SCORE: 45
ADLS_ACUITY_SCORE: 46
ADLS_ACUITY_SCORE: 39
ADLS_ACUITY_SCORE: 47
ADLS_ACUITY_SCORE: 42
ADLS_ACUITY_SCORE: 46
ADLS_ACUITY_SCORE: 46
ADLS_ACUITY_SCORE: 47
ADLS_ACUITY_SCORE: 42
ADLS_ACUITY_SCORE: 42

## 2024-07-14 NOTE — PROGRESS NOTES
"   07/14/24 0908   Appointment Info   Signing Clinician's Name / Credentials (PT) FREDY Jackson   Student Supervision On-site supervision provided;Direct supervision provided;Line of sight supervision provided;Direct Patient Contact Provided   Living Environment   People in Home spouse  ( Hugo)   Current Living Arrangements house   Home Accessibility stairs to enter home;stairs within home   Number of Stairs, Main Entrance 3   Stair Railings, Main Entrance railings on both sides of stairs   Number of Stairs, Within Home, Primary other (see comments)  (12 to get downstairs, but does not need to do right Away)   Stair Railings, Within Home, Primary railing on left side (ascending)   Transportation Anticipated family or friend will provide  ()   Self-Care   Usual Activity Tolerance good   Current Activity Tolerance fair   Regular Exercise Yes   Activity/Exercise Type walking   Exercise Amount/Frequency 3-5 times/wk  (5 times/wk)   Equipment Currently Used at Home cane, straight;shower chair;grab bar, toilet;grab bar, tub/shower;walker, rolling  (normally does not need AD)   Fall history within last six months no   Activity/Exercise/Self-Care Comment IND with self-cares   General Information   Onset of Illness/Injury or Date of Surgery 07/12/24   Referring Physician Cristi Saucedo MD   Patient/Family Therapy Goals Statement (PT) Did not endorse   Pertinent History of Current Problem (include personal factors and/or comorbidities that impact the POC) Per chart: \"Nicci Pemberton is a 64 year old female with a past medical history significant for 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 Labs notable for leukocytosis of 14.7 with left shift. . SED 83. Right knee XR showed a moderate effusion with no evidence of malalignment, hardware loosening, or acute fracture.\"   Existing Precautions/Restrictions weight bearing   Weight-Bearing Status - LUE full weight-bearing   Weight-Bearing Status - RUE full weight-bearing   Weight-Bearing Status - LLE full weight-bearing   Weight-Bearing Status - RLE weight-bearing as tolerated   Cognition   Affect/Mental Status (Cognition) WNL   Orientation Status (Cognition) oriented x 4   Follows Commands (Cognition) WNL   Pain Assessment   Patient Currently in Pain Yes, see Vital Sign flowsheet   Integumentary/Edema   Integumentary/Edema other (describe)   Integumentary/Edema Comments Surgican incision   Posture    Posture Forward head position;Protracted shoulders   Range of Motion (ROM)   ROM Comment Not formally tested, RLE ROM deficits noted d/t surgical precedure/pain   Strength (Manual Muscle Testing)   Strength Comments Not formally tested, RLE ROM deficits noted d/t surgical precedure/pain   Bed Mobility   Comment, (Bed Mobility) supine <> sitting EOB Rafa for RLE   Transfers   Comment, (Transfers) sit <> stand CGA with FWW   Gait/Stairs (Locomotion)   Comment, (Gait/Stairs) Gait CGA with FWW, stairs not assessed   Balance   Balance no deficits were identified   Sensory Examination   Sensory Perception patient reports no sensory changes   Coordination   Coordination no deficits were identified   Muscle Tone   Muscle Tone no deficits were identified   Clinical Impression   Criteria for Skilled Therapeutic Intervention Yes, treatment indicated   PT Diagnosis (PT) Impaired functional mobility   Influenced by the following impairments post-surgical pain, weakness   Functional limitations due to impairments bed " mobility, transfers, gait   Clinical Presentation (PT Evaluation Complexity) stable   Clinical Presentation Rationale Per clinical judgement   Clinical Decision Making (Complexity) low complexity   Planned Therapy Interventions (PT) balance training;bed mobility training;gait training;home exercise program;motor coordination training;neuromuscular re-education;patient/family education;postural re-education;ROM (range of motion);stair training;strengthening;transfer training;thermotherapy;progressive activity/exercise;risk factor education;home program guidelines   Risk & Benefits of therapy have been explained evaluation/treatment results reviewed;care plan/treatment goals reviewed;risks/benefits reviewed   PT Total Evaluation Time   PT Eval, Low Complexity Minutes (90586) 8   Physical Therapy Goals   PT Frequency Daily   PT Predicted Duration/Target Date for Goal Attainment 07/19/24   PT Goals Bed Mobility;Transfers;Gait;Stairs   PT: Bed Mobility Independent;Supine to/from sit   PT: Transfers Modified independent;Sit to/from stand;Assistive device   PT: Gait Modified independent;Rolling walker;100 feet   PT: Stairs Modified independent;Assistive device;5 stairs;Rail on both sides   Interventions   Interventions Quick Adds Therapeutic Activity;Therapeutic Procedure   Therapeutic Procedure/Exercise   Ther. Procedure: strength, endurance, ROM, flexibillity Minutes (85948) 13   Symptoms Noted During/After Treatment shortness of breath;fatigue;increased pain   Treatment Detail/Skilled Intervention Pt supine in bed upon PT arrival and agreeable to PT. Pt desat <90% throughout exercises, but would take a few deep breaths to resolve and would be >92%. Pt performed RLE supine exercises; ankle pumps x10, quad sets x10, hamstring sets x10, glute sets x10, heel slides x10 with assist under heel to help slide better, SLR x10 with assist to get through available ROM, SAQx10. Pt tolerated exercises well with some c/o pain with  heel slides, SLR, and SAQ.   Therapeutic Activity   Therapeutic Activities: dynamic activities to improve functional performance Minutes (24597) 24   Symptoms Noted During/After Treatment Shortness of breath;Fatigue;Dizziness;Increased pain   Treatment Detail/Skilled Intervention Extended time required for checking vitals and room setup. Pt transferred supine <> sitting EOB Rafa for RLE. Pt c/o some dizziness with position change which resolved after ~1 min. Pt transferred sit < > stand with CGA and FWW, cued to extend RLE. Pt c/o dizziness with standing which resolved after ~1 min. Pt ambulated 25' with CGA and FWW; pt had decreased gait speed, no gait deviations or LOB noted. Pt performed x5 side steps CGA with FWW alongside of bed; totalA required for pericares. Pt left supine in bed with call light within reach at end of session.   PT Discharge Planning   PT Plan Review HEP, progress ambulation   PT Discharge Recommendation (DC Rec) home with assist;home with outpatient physical therapy   PT Rationale for DC Rec Pt is below baseline for functional mobility and has assist from  at home. Would benefit from continued skilled PT intervention to progress independence with functional mobility.   PT Brief overview of current status Rafa bed mobility, CGA transfers and gait   Total Session Time   Timed Code Treatment Minutes 37   Total Session Time (sum of timed and untimed services) 45

## 2024-07-14 NOTE — OP NOTE
DATE OF SURGERY:  07/13/2024.    PREOPERATIVE DIAGNOSIS:  Infected right total knee arthroplasty.    POSTOPERATIVE DIAGNOSIS:  Infected right total knee arthroplasty.    PROCEDURE:  Debridement and irrigation with implant retention and polyethylene exchange of infected right total knee arthroplasty.    PRIMARY SURGEON:  Mina Black MD.    ASSISTANT SURGEON:  Cristi Saucedo MD; Amilcar Skinner MD; Krunal Vargas MS-4.    ANESTHESIA:  General endotracheal.    COMPLICATIONS:  None apparent intraoperatively or immediately postoperatively.    IMPLANTS:  Dilma/Biomet Vanguard 12 x 71 mm CR-lipped tibial insert and tibial tray Ascent locking bar.    SIGNIFICANT FINDINGS:  Grossly evident purulent appearing fluid present within knee joint.  Patellar tendon insertion preserved intact.  Medial parapatellar approach with quadriceps incision extended proximally but no other specific extensile approaches performed.  Femoral, tibial, and patellar components appeared stable.  Polyethylene exchanged.  One g vancomycin powder sprinkled into knee joint.    SPECIMENS:  One synovial fluid and two tissue (suprapatellar pouch synovium, posterior capsule) cultures sent to microbiology for gram stain, aerobic culture, anaerobic culture, fungal culture, and AFB stain.    DRAINS:  None.    ESTIMATED BLOOD LOSS:  Approximately 50 mL.    TOURNIQUET TIME:  120 minutes at 250 mm Hg.      INDICATIONS:  Nicci Pemberton is a 64 year old female patient with a history of liver transplantation on immunosuppressive medications who developed an acute hematogenous infection of a previously well-functioning total knee arthroplasty originally implanted in 2020.  She had had a previous superficial infection in the contralateral left knee that was treated with a debridement and irrigation, and this appeared to have resolved.  The recommendation was made for a debridement and implant retention, with exchange of the polyethylene liner, of her infected right total  knee arthroplasty.  The diagnosis and expected prognosis, nature of the recommended procedure, the risks, benefits, and alternatives, the postoperative plan, and realistic expectations for short and long term outcome were all discussed with the patient in layman's terms.  No guarantees were expressed or implied as to outcome.  The patient had the opportunity to have all the questions she had at the time asked and answered appropriately, verbalized her understanding of this discussion, and verbalized her wish to proceed with the planned procedure.  Written informed consent was obtained.     DESCRIPTION OF PROCEDURE:             The patient, Nicci Pemberton, was met in the preoperative area where the correct surgical site was identified with patient participation and marked by the primary surgeon.  All questions were answered.  The patient was then brought to the operating room and was safely transferred to operating table in the supine position with all bony prominences well padded and a safety strap across the waist.  The anesthesia team successfully general anesthesia and placed an ET tube.  Cefazolin held until intraoperative cultures could be taken.  Venous thromboembolic prophylaxis was performed with a sequential device on the nonoperative lower extremity.  A tourniquet was placed on the thigh of the operative lower extremity.  The operative lower extremity was then prepped and draped free in the usual sterile fashion.  A stockinette and Coban were used to cover the right foot and lower leg.  All exposed skin was covered with Ioban.   The LadderLock positioning system was used to help position the lower extremity.  There were no open wounds present.  The previous scar was well healed and dry.  Prior to the start of surgery, a multidisciplinary time out was performed per hospital protocol confirming among the other items, the correct patient identity, procedure, body part, and laterality.  All in the room  verbalized agreement.     The right lower extremity was gravity exsanguinated and the tourniquet was inflated to 250 mm Hg.  The previous scar was incised directly over the scar.  Careful dissection was performed.  Meticulous hemostasis was achieved with electrocautery.  Modest medial and small lateral flaps were carefully created taking care to keep the skin flaps thick and contiguous to to the fascial layer to preserve perforating vessels.  There was no visible purulence at this point.  The capsule appeared intact.  A medial parapatellar arthrotomy was performed with a scalpel.  A large purulent appearing effusion was evacuated and sent for cultures.  A medial subperiosteal release was performed off the proximal tibia.  A thorough synovectomy was performed in the suprapatellar pouch and sent for cultures.  Angled Hohmann retractors were placed directly on bone along the medial and lateral tibial plateau.  As expected there was significant scarring present requiring a thorough debridement of scar tissue and careful release of soft tissues form bone to adequately expose the knee for the procedure.  Meticulous care was taken to preserve the patellar tendon insertion which was not injured during the case.  Infrapatellar fat pad was excised.  The knee was flexed to an adequate degree to allow exposure for the procedure.  The femoral, tibial, and patellar components all appeared to be stable.  The previous locking bar for the polyethylene liner was easily removed with a Arcadia elevator and an osteotome.  The previous liner was easily removed and intact.  The thorough synovectomy was now continued and completed, and the posterior capsule synovium was sent to microbiology for cultures.  Meticulous care was taken to avoid neurovascular injury with the posterior synovectomy, as well as to avoid injury to the patellar tendon and collateral ligaments with the other portions of the synovectomy.  The joint was thoroughly  irrigated out with 6 L NS with cystoscopy tubing.  One L of Irrisept irrigation was used to bathe the joint and then irrigated and aspirated out.  All members of the surgical team then re-scrubbed and re-gowned, and the lower extremity was then re-prepped and draped including new Ioban to cover all exposed skin.  Angled Hohmann retractors were again carefully placed directly on bone medially and laterally, and a PCL retractor was carefully placed directly on bone posteriorly.  The joint was thoroughly inspected and noted to be clean appearing.  The tibial tray was irrigated appeared to be free of any overlying soft tissue.  Retractors were removed and a new 12 mm x 71 insert (same size as the previous polyethylene insert, confirmed on the previous OR records) was applied to the proximal tibia and secured with the locking clip.  The insert was tested and noted to be stable.  There was excellent range of motion from approximately 0-110 degrees of knee flexion, good patellar tracking, and slight valgus laxity with an endpoint.  One g of vancomycin powder was sprinkled into the knee joint.  The capsule was closed with interrupted 0 PDS sutures and a running 0 Stratafix suture.  The deep subcutaneous tissues were closed with a running 0 PDS suture.  The superficial subcutaneous tissues were closed with interrupted 2-0 PDS sutures.  The skin was closed with interrupted 2-0 Prolene sutures.  The incision was carefully cleansed with sterile saline and dried.  The tourniquet was deflated at 120 minutes.  Skin margins appeared well-perfused.  Sterile dressings were then applied.  All surgical counts were reported as correct at the end of the case.  There was an easily palpable right dorsalis pedis pulse with a warm, well-perfused right foot.  The patient was extubated and brought to the recovery room in stable condition.  I was present in the room for setup and positioning, as well as present and scrubbed in for the entire  case from start to finish (except for the re-scrub and re-gowning portion intraoperatively).      POSTOPERATIVE PLAN:     Admit to [facility] Medicine primary with orthopaedic consultation.  Diet: Clear liquids and advance as tolerated.  Antibiotics: Continue preoperative antibiotic regimen.  Tailor antibiotic therapy as guided by infectious disease consultation and culture results.  Pain management: Multimodal. Wean from IV to oral medications.  Weightbearing status: Weightbear as tolerated on the operative lower extremity with appropriate assistive gait devices and assistance (nursing/PT) for ambulation.  Activity: Up with assistance until independent.  Elevate heels off of the bed.  PT/OT: Gait training, transfers, ADLs.  Labs: Follow culture results. Trend Hg and CRP.  Xrays:  AP/lat R knee radiographs in PACU.  Immobilization: None.  Dressings: Keep clean, dry, and intact.  DVT prophylaxis:  mg po daily until 6 weeks postoperative.  Disposition: If no further debridements are needed, may discharge home pending pain control, medical stability, appropriate clearance from PT for safe discharge, and an appropriate antibiotic therapy plan.  Follow up: 2 weeks postop in ortho clinic for wound check and possible suture removal if appropriate to do so at that time.      Mina Black MD  Attending surgeon  Orthopaedic Surgery

## 2024-07-14 NOTE — PROGRESS NOTES
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 Labs notable for leukocytosis of 14.7 with left shift. . SED 83. Right knee XR showed a moderate effusion with no evidence of malalignment, hardware loosening, or acute fracture.    Today's changes:   Pain overall better.  RN: Hugh.  DC ketamine drip.  Wean down narcotics as able.  Postop management per surgical team   Antibiotics per Infectious disease  Follow-up cultures    7/13/2024  Sp  Irrigation and debridement lower extremity, combined, Right - Leg; Polyethylene exchange, Right - Knee.by Dr Black. EBL: 150 ml.  Complications: None      #1 Right knee pain, concern for prosthetic knee infection  Orthopedic surgery consulted. Right knee aspirated with cloudy aspirate. Cell count with differential and aerobic/anaerobic cultures obtained. Sp I and D as above 7/13.   Post Op Mx per Ortho.   Resume regular diet.   PT/OT. Activity per ortho.   DVT PPx per Ortho.   Infectious disease consulted, appreciate input.  Antibiotics plan per infectious disease.  Continue IV vancomycin, IV ceftriaxone.  Follow-up IntraOp cultures.  Check A1c_5.    #2 NAFLD/ANGULO S/P Liver Transplantation   #3 Immunosuppression   #4 Thrombocytpenia   Hx of rejection  "treated with high dose steroids  Continue PTA monotherapy with Cyclosporine   Continue outpatient follow as indicated per GI  Platelets 99 (baseline 150)--monitor  Follow LFT.       #5 CKD III  Creatinine 1.13 on admission, at baseline     #6 Hypothyroidism  Most recent TSH 0.56 on 04/24/24  Continue PTA levothyroxine     #7 Hypertension  Controlled  Not on any antihypertensives     #8 Severe Obesity: Estimated body mass index is 40.97 kg/m  as calculated from the following:  Height as of this encounter: 1.575 m (5' 2\").  Weight as of this encounter: 101.6 kg (224 lb).                   Diet: Advance Diet as Tolerated: Regular Diet Adult    DVT Prophylaxis: Defer to orthopedic surgery  Forrester Catheter: Not present  Lines: None     Cardiac Monitoring: None  Code Status: Full Code      Clinically Significant Risk Factors          # Hypocalcemia: Lowest Ca = 8.4 mg/dL in last 2 days, will monitor and replace as appropriate       # Thrombocytopenia: Lowest platelets = 89 in last 2 days, will monitor for bleeding   # Hypertension: Noted on problem list              # Severe Obesity: Estimated body mass index is 40.97 kg/m  as calculated from the following:    Height as of this encounter: 1.575 m (5' 2\").    Weight as of this encounter: 101.6 kg (224 lb)., PRESENT ON ADMISSION            Disposition Plan     Medically Ready for Discharge: Anticipated in 2-4 Days             Sterling Meier MD  Hospitalist Service, [address]  Securely message with [employer] (more info)  Text page via [facility] Paging/Directory   See signed in provider for up to date coverage information  ______________________________________________________________________    Interval History     Interval events reviewed. Doing better.  Hallucinations per RN.  Pain better controlled. No acute or new medical concern.  No  vomiting or pain abdomen or chest pain or cough or shortness of breath.  No other " concern    Physical Exam   Vital Signs: Temp: 97.7  F (36.5  C) Temp src: Oral BP: 111/47 Pulse: 77   Resp: 16 SpO2: 99 % O2 Device: Nasal cannula Oxygen Delivery: 3 LPM  Weight: 224 lbs 0 oz      General Appearance: Awake, interactive, NAD  Respiratory: Normal work of breathing.   Cardiovascular: RRR  GI: Soft. NT. ND.  Extremities: Distally wwp. No pedal edema.  RLE on dressing.   Skin: No acute rash on exposed areas.   Neuro: Grossly non focal.   Others: Stable mood.      Medical Decision Making       45 MINUTES SPENT BY ME on the date of service doing chart review, history, exam, documentation & further activities per the note.      Data     I have personally reviewed the following data over the past 24 hrs:    N/A  \   11.6 (L)   / N/A     N/A N/A N/A /  86   N/A N/A 0.91 \     TSH: N/A T4: N/A A1C: N/A       Imaging results reviewed over the past 24 hrs:   Recent Results (from the past 24 hour(s))   XR Knee Port Right 1/2 Views    Narrative    EXAM: XR KNEE PORT RIGHT 1/2 VIEWS  LOCATION: [facility]  DATE: 7/13/2024    INDICATION: Post Op Total Knee  COMPARISON: None.      Impression    IMPRESSION: Postoperative changes right total knee arthroplasty and patellar resurfacing. The components appear well seated. Soft tissue swelling about the knee joint but no significant intra-articular effusion.     Recent Labs   Lab 07/14/24  0645 07/14/24  0557 07/13/24  0620 07/12/24  1003   WBC  --   --  9.7 14.7*   HGB 11.6*  --  11.1* 12.1   MCV  --   --  102* 99   PLT  --   --  89* 99*   NA  --   --  138 133*   POTASSIUM  --   --  4.0 3.6   CHLORIDE  --   --  107 98   CO2  --   --  22 25   BUN  --   --  22.5 30.5*   CR 0.91  --  0.89 1.13*   ANIONGAP  --   --  9 10   JACOB  --   --  8.4* 8.7*   GLC  --  86 92 156*   ALBUMIN  --   --   --  3.5   PROTTOTAL  --   --   --  6.6   BILITOTAL  --   --   --  1.7*   ALKPHOS  --   --   --  98   ALT  --   --   --  25   AST  --   --   --   32

## 2024-07-14 NOTE — PROGRESS NOTES
"  VS: /57 (BP Location: Right arm)   Pulse 69   Temp 97.8  F (36.6  C) (Oral)   Resp 18   Ht 1.575 m (5' 2\")   Wt 101.6 kg (224 lb)   SpO2 96%   BMI 40.97 kg/m      O2: RA to 2 L O2 when sleeping   Output: Voids in toilet   Last BM: 7/11   Activity: 1 assist w/ GB & walker   Skin: Intact except for R knee incision   Pain: Complains of intermittent pain in R knee Ketamine drip stopped today due to hallucinations   CMS: intact   Dressing: RLE dressing ace wrap, CDI   Diet: reg   LDA: PIVs in LAC & LFA   Equipment: GB, walker   Plan: IV antibiotics at least through tomorrow, will discharge home TBD   Additional Info:       "

## 2024-07-14 NOTE — PLAN OF CARE
Goal Outcome Evaluation: S/P I/D with a Polyethylene exchange     Plan of Care Reviewed With: patient  Overall Patient Progress: no change    VS:     A/Ox4.  Temp: 98.2  F (36.8  C) Temp src: Oral BP: 131/74 Pulse: 76   Resp: 15 SpO2: 98 % O2 Device: Nasal cannula Oxygen Delivery: 2 LPM    Output:     Voids spontaneously in purewick.   Last BM on 7/11/24.  Bowel sound present in all quadrants and she is passing gas.   Activity:     RLE WBAT.   Not OOB yet this shift.   Skin: Intact, no open area.   Pain:     Constant aching with burning sensation on the R knee rated 8/10 controlled with Ketamine drip and PRN Oxycodone given 1x this shift.  C/O occasional double vision and hallucination since Ketamine was started. Dr. Sanon notified and reduced the rate from 10mg/hr to 5mg/hr.   CMS:     Feeling numbed and heavy on the RLE. Otherwise, CMS intact to all extremities.   Dressing: RLE wrapped with ace bandage, c/d/i.   Diet: Regular diet and tolerated. Denied N/V.   LDA:     2 L PIV's patent, one is saline locked and the other one has Ketamine drip infusing continuously.    R PIV got infiltrated from Vancomycin. Redness and swelling noted. Vancomycin stopped. Dr. Sanon informed. RUE elevated on pillow. Coldpack applied.    Equipment: SCD to LLE.   Attached to continuous pulse oximeter.   Plan: Pain Mgt., PT/OT   Additional Info:     ID is following.   Discharge TBD.

## 2024-07-14 NOTE — PROVIDER NOTIFICATION
8:16 AM    11/11/20    Site: hand right, condition patent and no redness    # of attempts 1 Patient complained of visual hallucinations.  She stated she was seeing flowers and reny when she opened her eyes.  The patient stated her head felt fuzzy and she would like to stop the infusion.  This RN paged MD Meier to notify.  Dr Meier verbally stated to stop the infusion.  The infusion was stopped.

## 2024-07-14 NOTE — PROGRESS NOTES
07/14/24 1100   Appointment Info   Signing Clinician's Name / Credentials (OT) Tg Mendes, OTR/L   Living Environment   People in Home spouse   Current Living Arrangements house   Home Accessibility stairs to enter home;stairs within home   Number of Stairs, Main Entrance 3   Stair Railings, Main Entrance railings on both sides of stairs   Living Environment Comments able to live on main level with tub shower; WIS on lower level   Self-Care   Usual Activity Tolerance good   Current Activity Tolerance moderate   Equipment Currently Used at Home cane, straight;walker, rolling;shower chair;raised toilet seat  (FWW, reacher)   Fall history within last six months no   Activity/Exercise/Self-Care Comment ind with I/ADLs at baseline;  is able to assist   General Information   Onset of Illness/Injury or Date of Surgery 07/12/24   Referring Physician Dr. 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 Labs notable for leukocytosis of 14.7 with left shift. . SED 83. Right knee XR showed a moderate effusion with no evidence of malalignment, hardware loosening, or acute fracture.   Existing Precautions/Restrictions weight bearing   Left Upper Extremity (Weight-bearing Status) full weight-bearing (FWB)   Right Upper Extremity  (Weight-bearing Status) full weight-bearing (FWB)   Left Lower Extremity (Weight-bearing Status) full weight-bearing (FWB)   Right Lower Extremity (Weight-bearing Status) weight-bearing as tolerated (WBAT)   Cognitive Status Examination   Orientation Status orientation to person, place and time   Affect/Mental Status (Cognitive) WFL   Follows Commands WFL   Visual Perception   Visual Impairment/Limitations corrective lenses for reading   Sensory   Sensory Quick Adds sensation intact   Pain Assessment   Patient Currently in Pain Yes, see Vital Sign flowsheet   Posture   Posture not impaired   Range of Motion Comprehensive   General Range of Motion no range of motion deficits identified   Strength Comprehensive (MMT)   General Manual Muscle Testing (MMT) Assessment no strength deficits identified   Muscle Tone Assessment   Muscle Tone Quick Adds No deficits were identified   Coordination   Upper Extremity Coordination No deficits were identified   Bed Mobility   Bed Mobility supine-sit;sit-supine   Supine-Sit Coke (Bed Mobility) contact guard   Sit-Supine [place] (Bed Mobility) contact guard   Transfers   Transfers bed-chair transfer;sit-stand transfer;toilet transfer   Transfer Skill: Bed to Chair/Chair to Bed   Bed-Chair [place] (Transfers) contact guard   Sit-Stand Transfer   Sit-Stand [place] (Transfers) contact guard   Toilet Transfer   [place] Level (Toilet Transfer) contact guard   Balance   Balance Assessment no deficits identified   Activities of Daily Living   BADL Assessment/Intervention lower body dressing;toileting   Lower Body Dressing Assessment/Training   [place] Level (Lower Body Dressing) contact guard assist   Toileting   [place] Level (Toileting) contact guard assist   Clinical Impression   Criteria for Skilled Therapeutic Interventions Met (OT) Yes, treatment indicated   OT Diagnosis decreased ADL ind   Influenced by the following impairments s/p knee I&D   OT  Problem List-Impairments impacting ADL problems related to;activity tolerance impaired;pain   Assessment of Occupational Performance 3-5 Performance Deficits   Identified Performance Deficits dressing, toileting, bed mob   Planned Therapy Interventions (OT) ADL retraining   Clinical Decision Making Complexity (OT) detailed assessment/moderate complexity   Risk & Benefits of therapy have been explained evaluation/treatment results reviewed;patient   OT Total Evaluation Time   OT Eval, Moderate Complexity Minutes (51770) 10   OT Goals   Therapy Frequency (OT) Daily   OT Predicted Duration/Target Date for Goal Attainment 07/21/24   OT Goals Lower Body Dressing;Bed Mobility;Transfers;Toilet Transfer/Toileting;OT Goal 1   OT: Lower Body Dressing Modified independent;using adaptive equipment   OT: Bed Mobility Modified independent;supine to/from sitting   OT: Transfer Independent;with assistive device   OT: Toilet Transfer/Toileting Modified independent;using adaptive equipment   OT: Goal 1 Pt SBA to complete tub transfer with shower chair   Interventions   Interventions Quick Adds Self-Care/Home Management   Self-Care/Home Management   Self-Care/Home Mgmt/ADL, Compensatory, Meal Prep Minutes (91400) 25   Symptoms Noted During/After Treatment (Meal Preparation/Planning Training) fatigue;increased pain   Treatment Detail/Skilled Intervention Pt supine in bed upon OT arrival and agreeable to therapy. Focus on progressing ind, safety and act tolerance with ADLs. Educ on safe bed mob and positioning. Pt SBA supine<>sit bed mob with increased time and effort to encourage ind. Educ on use of leg  if needed. Educ on safe transfers with FWW. SBA STS, bed and toilet transfers with FWW. Educ on safe LB dressing. Pt SBA for toileting. Pt SBA LB dressing to don brief while sitting EOB and standing with FWW. Pt left supine in bed with needs in reach.   OT Discharge Planning   OT Plan flat bed mob, tub transfer, LB dressing,  standing act tolerance   OT Discharge Recommendation (DC Rec) home with assist   OT Rationale for DC Rec Pt is below baseline and would benefit from continued skilled OT to increase safety, ADL ind and act tolerance. Anticipate pending LOS, pt will progress to home with assist from  as needed   OT Brief overview of current status SBA   Total Session Time   Timed Code Treatment Minutes 25   Total Session Time (sum of timed and untimed services) 35

## 2024-07-14 NOTE — PROGRESS NOTES
"  Orthopaedic Surgery Daily Progress Note     Subjective:   Pain is controlled. No pain at rest. Pain increases with movement.  Tolerating diet. No urinary difficulties. No fevers or chills.     Physical Exam   /64 (BP Location: Right arm)   Pulse 70   Temp 98  F (36.7  C) (Oral)   Resp (!) 7   Ht 1.575 m (5' 2\")   Wt 101.6 kg (224 lb)   SpO2 99%   BMI 40.97 kg/m      Intake/Output Summary (Last 24 hours) at 7/14/2024 0558  Last data filed at 7/14/2024 0100  Gross per 24 hour   Intake 1397 ml   Output 500 ml   Net 897 ml     General: Alert, well-appearing  in no acute distress.  Respiratory: Non-labored breathing.   Cardiovascular: Extremities warm and well perfused   Extremities: moving all four extremities.   RLE:  -Sens: SILT dp/sp/s/s/t  -Motor: 5/5 EHL/FHL/TA/GSc  -Vasc: 2+ pulses, wwp, brisk cap refill    Dressing CDI    Skin: As noted above. No rashes or lesions appreciated.    Labs    Complete Blood Count   Recent Labs   Lab 07/13/24  0620 07/12/24  1003   WBC 9.7 14.7*   HGB 11.1* 12.1   PLT 89* 99*     Basic Metabolic Panel  Recent Labs   Lab 07/13/24  0620 07/12/24  1003    133*   POTASSIUM 4.0 3.6   CHLORIDE 107 98   CO2 22 25   BUN 22.5 30.5*   CR 0.89 1.13*   GLC 92 156*     Coagulation Profile  No lab results found in last 7 days.    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 Black    Today:   - Pain control   - Follow cultures   - Pt and OT      Activity: Up with assist  Weight bearing status: WBAT on the RLE   Antibiotics:  Broad spectrum until cultures  Diet: Regular. Bowel regimen. Anti-emetics PRN.    DVT prophylaxis: Mechanical +  mgstarting PODElevation: Elevate heels off of bed on pillows   Wound Care: Keep dressings in place until POD3  Drains: none  Pain management: Orals PRN, IV for breakthrough only  X-rays: AP and lateral Xrs " of the knee in pacu  Physical Therapy: Mobilization, ROM, ADL's  Occupational Therapy: ADL's  Labs: Trend Hgb on POD #1  Consults: PT, OT, infectious disease  Disposition: TBD    --  Evert Saucedo MD  Orthopedic Surgery PGY-4

## 2024-07-14 NOTE — PROGRESS NOTES
[facility] ID SERVICE :  PROGRESS NOTE    Patient:  Nicci Pemberton   YOB: 1960, MRN: [id number]  Date of Visit: 07/14/2024  Date of Admission: 7/12/2024  Consult Requester: Fab White DO          Assessment and Recommendations:     ID Problem List:  Right knee PJI s/p I&D, polyethylene liner exchange 7/13/2024 7/13/2024 - intra-op cultures in-process, growing Strep agalactiae (Group B Strep)  7/12/2024 synovial fluid analysis TNC 47975, 96% neutrophils. 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 Reviewed OP findings - Cloudy synovial fluid. Multiple intra-op cultures are obtained. Noted growth of Strep agalactiae (Group B Strep) from synovial fluid and intra-op cultures. Recommend to continue empiric antibiotics for now. Check MRSA nares. Allow another day for intra-op cultures and if no MRSA growth and negative MRSA nares swab then discontinue iv vancomycin.     Recommendations:  Check MRSA nares (ordered)  Continue current antibiotic regimen:  IV Vancomycin. If negative MRSA nares and no evidence of MRSA growth in cultures (negative so far), then discontinue vancomycin.  IV ceftriaxone 2g daily  Follow up cultures - synovial fluid, and intra-op  Should be able to finalize antibiotic modality and duration in next 1-2 days.      ID team will continue to follow. Please reach out if any questions or concerns.     Total time spent during  this encounter (chart review, documentation, MDM, coordination of care): 50 minutes    Glendy Lemus MD   Infectious Diseases Staff Physician  Pager: [phone]  Vocera hemanth   07/14/2024         Interval History and Events:     Seen and examined - no new complaints. No fever, chills, sweating. RLE remains in post surgery dressing.          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      Evaluated by Orthopedic team, had arthrocentesis yielding ~60cc of  very cloudy yellow synovial fluid. Fluid analysis showed 916 TNC, neutrophilic predominance. Gram stain showed GPC, pending culture. Underwent I&D, polyethylene liner exchange today. OP findings showed cloudy synovial fluid. Multiple intra-op cultures were obtained.      Patient was seen and examined at bedside in PACU.   As history provided by patient, does have intermittent right knee pain which attributes to underlying TKR. Though started excruciating pain w/o direct trauma/injury/falls about 3 days prior to admission. In addition, noted mild redness, warmth, swelling of right knee, with progressively worsening of symptoms that led to ER visit and admission. Also recalls, subjective fevers, shaking along with it.      Regarding history of left TKR (Sept 2021) and superficial infection s/p I&D (11/2021):  Cultures with polymicrobial growth: aerobic (Enterococcus faecalis (S ampicillin, vancomycin), Citrobacter koseri,  "MRSE, Str oralis), anaerobic (Anaerococcus spp, Peptoniphilus asaccharolyticus). Received multiple rotating antibiotics per orthopedic and never evaluated by ID at that time. Antibiotic history per documentation by Orthopedic consultation 7/12/2024: \"Keflex x 2 days switched to: Linezolid 600 mg p.o. twice daily x10 days, Vantin 200 mg p.o. twice daily x10 days. Then Vantin 200mg PO BID every day x 10days, linezolid 600mg PO BID daily x 10 days.\"     Former smoker, quit in 2011. No diabetes history, last A1c 2019.          Review of Systems:   Targeted 10 point ROS was completed with pertinent positives and negatives are detailed above.         Antimicrobial history:     Current:  Vancomycin, ceftriaxone 7/12 - present          Physical Examination:     Vital signs:  /47 (BP Location: Right arm)   Pulse 77   Temp 97.7  F (36.5  C) (Oral)   Resp 16   Ht 1.575 m (5' 2\")   Wt 101.6 kg (224 lb)   SpO2 99%   BMI 40.97 kg/m      GENERAL: alert and no distress  NECK: no adenopathy, no asymmetry, masses, or scars  RESP: lungs clear to auscultation - no rales, rhonchi or wheezes  CV: regular rate and rhythm, normal S1 S2, no S3 or S4, no murmur, click or rub, no peripheral edema  ABDOMEN: soft, nontender, no hepatosplenomegaly, no masses and bowel sounds normal  MS: no gross musculoskeletal defects noted, no edema except RLE covered in ace wrap post surgery dressing.       Lines and devices:    PIV CDI, non-tender, no surrounding erythema.  External urinary catheter in place    Labs, Microbiology and Imaging studies reviewed.   Elevated CRP    7/12/2024 synovial fluid analysis: TNC 56038, 96% neutrophils.          Laboratory Data:       Microbiology:    Culture   Date Value Ref Range Status   07/13/2024 No anaerobic organisms isolated after 1 day  Preliminary   07/13/2024 No growth after 1 day  Preliminary   07/13/2024 Culture in progress  Preliminary   07/13/2024 (A)  Preliminary    1+ Streptococcus agalactiae " (Group B Streptococcus)   07/13/2024 No anaerobic organisms isolated after 1 day  Preliminary   07/13/2024 No growth after 1 day  Preliminary   07/13/2024 Culture in progress  Preliminary   07/13/2024 (A)  Preliminary    1+ Streptococcus agalactiae (Group B Streptococcus)   07/13/2024 No anaerobic organisms isolated after 1 day  Preliminary   07/13/2024 No growth after 1 day  Preliminary   07/13/2024 Culture in progress  Preliminary   07/13/2024 (A)  Preliminary    1+ Streptococcus agalactiae (Group B Streptococcus)   07/12/2024 No growth after 1 day  Preliminary   07/12/2024 No growth after 2 days  Preliminary   07/12/2024 (A)  Preliminary    2+ Streptococcus agalactiae (Group B Streptococcus)   07/12/2024 No anaerobic organisms isolated after 1 day  Preliminary   11/10/2021 4+ Anaerococcus species (A)  Final     Comment:     Susceptibilities done on previous cultures   11/10/2021 2+ Peptoniphilus asaccharolyticus (A)  Final   11/10/2021 No Growth  Final   11/10/2021 No Growth  Final   11/10/2021 4+ Citrobacter koseri (A)  Final     Comment:     Susceptibilities done on previous cultures   11/10/2021 4+ Enterococcus faecalis (A)  Final     Comment:     Susceptibilities done on previous cultures   11/10/2021 4+ Streptococcus oralis (A)  Final     Comment:     Susceptibilities done on previous cultures   11/10/2021 4+ Citrobacter koseri (A)  Final     Comment:     Susceptibilities done on previous cultures   11/10/2021 Isolated in broth only Enterococcus faecalis (A)  Final     Comment:     On day 1 of incubation  Susceptibilities done on previous cultures   11/10/2021 4+ Anaerococcus species (A)  Final   11/10/2021 No Growth  Final   11/10/2021 4+ Citrobacter koseri (A)  Final   11/10/2021 4+ Enterococcus faecalis (A)  Final   11/10/2021 4+ Staphylococcus epidermidis (A)  Final   11/10/2021 3+ Streptococcus oralis (A)  Final   10/13/2020 No Growth  Final   08/05/2019 No Growth  Final   08/02/2019   Final    No  Salmonella, Shigella, Yersinia, or Campylobacter.   07/31/2019 No Growth  Final   04/14/2019 Mixture of urogenital organisms  Final   05/04/2018 CITROBACTER KOSERI (A)  Final     Comment:     >100,000 col/ml Citrobacter koseri     GS Culture   Date Value Ref Range Status   07/13/2024 See corresponding culture for results  Final   07/13/2024 See corresponding culture for results  Final   07/13/2024 See corresponding culture for results  Final     Inflammatory Markers:   Recent Labs   Lab Test 07/12/24  1003 12/10/20  0844 12/09/20  0637 12/08/20  0511 12/07/20  0559 12/06/20  0504 10/30/20  0649 10/28/20  1603 06/18/18  1024   SED 83*  --   --   --   --   --   --  100* 17   CRP  --  51.6* 87.0* 165.0* 210.0* 119.0* 54.7* 108.0* 8.9*     Urine Studies:    Recent Labs   Lab Test 02/20/24  1043 02/06/23  1041 01/13/22  1129 05/04/21  1115 12/05/20  1826 09/21/20  0947 08/27/20  0940   LEUKEST Trace* Negative Negative Trace* Negative Moderate* Trace*   WBCU 0-5 0-5  --  3  --  * 3     Hematology Studies:    Recent Labs   Lab Test 07/14/24  0645 07/13/24  0620 07/12/24  1003 05/29/24  1030 02/20/24  1028 11/28/23  1613 11/07/23  1032 04/06/21  1018 03/29/21  1040 03/27/21  0934 03/25/21  1325 01/07/21  1146 12/29/20  1108 12/11/20  0523 12/10/20  0844 12/09/20  0637   WBC  --  9.7 14.7* 5.2 4.5 6.0 5.0   < > 18.4* 4.7 5.4   < > 4.6   < > 5.7 5.9   ANEU  --   --   --   --   --   --   --   --  16.0* 3.1 3.2  --  3.0  --  4.1 3.9   AEOS  --   --   --   --   --   --   --   --  0.0 0.2 0.2  --  0.2  --  0.1 0.2   HGB 11.6* 11.1* 12.1 13.3 13.2 13.1 13.2   < > 12.3 12.2 13.2   < > 12.0   < > 11.4* 10.6*   MCV  --  102* 99 100 97 101* 101*   < > 98 97 97   < > 99   < > 99 99   PLT  --  89* 99* 153 148* 156 159   < > 155 127* 142*   < > 147*   < > 117* 106*    < > = values in this interval not displayed.      Metabolic Studies:   Recent Labs   Lab Test 07/14/24  0645 07/13/24  0620 07/12/24  1003 05/29/24  1030  05/06/24  1034 04/02/24  1027   NA  --  138 133* 143 143 141   POTASSIUM  --  4.0 3.6 5.1 4.9 5.4*   CHLORIDE  --  107 98 106 108* 106   CO2  --  22 25 25 25 27   BUN  --  22.5 30.5* 21.2 23.4* 23.4*   CR 0.91 0.89 1.13* 1.06* 1.08* 1.06*   GFRESTIMATED 70 72 54* 59* 57* 59*     Hepatic Studies:   Recent Labs   Lab Test 07/12/24  1003 05/29/24  1030 05/06/24  1034 04/02/24  1027 03/27/24  1321 03/19/24  1013   BILITOTAL 1.7* 1.2 1.7* 1.5* 2.2* 1.8*   ALKPHOS 98 96 94 90 93 88   ALBUMIN 3.5 4.0 3.9 4.1 4.1 4.1   AST 32 37 28 33 35 29   ALT 25 21 16 19 18 18          Last check of C difficile  C Diff Toxin B PCR   Date Value Ref Range Status   09/16/2019 Positive (A) NEG^Negative Final     Comment:     Positive: Toxin producing Clostridium difficile target DNA sequences detected,   presumed positive for Clostridium difficile toxin B.  Clostridium difficile (Requires Enteric Isolation)  FDA approved assay performed using [employer] GeneXpert real-time PCR.  Called to  Cinthia Browne RN [id number] at [phone] on 9.16.19 rd.         CSF testing     Recent Labs   Lab Test 09/05/19  1549 09/05/19  1545   CWBC Test not performed. Criteria not met for second cell count. 1   CRBC Test not performed. Criteria not met for second cell count. 12*   CGLU  --  51   CTP  --  44       Last Pathology Report   Case Report   Date Value Ref Range Status   09/12/2023   Final    Surgical Pathology Report                         Case: [id number]                                  Authorizing Provider:  Marilynn Thomas MD      Collected:           09/12/2023 11:31 AM          Ordering Location:     [facility]          Received:            09/12/2023 12:34 PM                                 Dermatology Clinic                                                                                  [place]                                                                  Pathologist:           William Mccoy MD                                                       Specimens:   A) - Skin, Central lower back                                                                       B) - Skin, Right medial buttocks                                                            Clinical Information   Date Value Ref Range Status   2023   Final    post-menopausal       Final Diagnosis   Date Value Ref Range Status   2023   Final    A(1). Skin, Central lower back, shave:  - Intradermal melanocytic nevus - (see description)     B(2). Skin, Right medial buttocks, shave:  - Nevus lipomatosus superficialis - (see description)                  Imagin2024 XR Right knee:  IMPRESSION: Right total knee arthroplasty, satisfactory alignment. No signs of loosening. No acute fracture. Moderate joint effusion.     2024 US RLE:  IMPRESSION:  Negative for deep vein thrombosis.

## 2024-07-15 ENCOUNTER — APPOINTMENT (OUTPATIENT)
Dept: PHYSICAL THERAPY | Facility: CLINIC | Age: 64
DRG: 486 | End: 2024-07-15
Payer: COMMERCIAL

## 2024-07-15 ENCOUNTER — HOME INFUSION (PRE-WILLOW HOME INFUSION) (OUTPATIENT)
Dept: PHARMACY | Facility: CLINIC | Age: 64
End: 2024-07-15

## 2024-07-15 ENCOUNTER — APPOINTMENT (OUTPATIENT)
Dept: OCCUPATIONAL THERAPY | Facility: CLINIC | Age: 64
DRG: 486 | End: 2024-07-15
Payer: COMMERCIAL

## 2024-07-15 LAB
ALBUMIN SERPL BCG-MCNC: 2.6 G/DL (ref 3.5–5.2)
ALP SERPL-CCNC: 123 U/L (ref 40–150)
ALPHA DEFENSINS 1+2+3 SNV QL IA: POSITIVE
ALT SERPL W P-5'-P-CCNC: 15 U/L (ref 0–50)
ANION GAP SERPL CALCULATED.3IONS-SCNC: 6 MMOL/L (ref 7–15)
AST SERPL W P-5'-P-CCNC: 29 U/L (ref 0–45)
BACTERIA SNV CULT: ABNORMAL
BACTERIA TISS BX CULT: ABNORMAL
BILIRUB SERPL-MCNC: 0.5 MG/DL
BUN SERPL-MCNC: 17.8 MG/DL (ref 8–23)
CALCIUM SERPL-MCNC: 8.8 MG/DL (ref 8.8–10.2)
CHLORIDE SERPL-SCNC: 103 MMOL/L (ref 98–107)
CREAT SERPL-MCNC: 1.07 MG/DL (ref 0.51–0.95)
CRP SERPL-MCNC: 248.35 MG/L
DEPRECATED HCO3 PLAS-SCNC: 28 MMOL/L (ref 22–29)
EGFRCR SERPLBLD CKD-EPI 2021: 58 ML/MIN/1.73M2
ERYTHROCYTE [DISTWIDTH] IN BLOOD BY AUTOMATED COUNT: 11.9 % (ref 10–15)
GLUCOSE BLDC GLUCOMTR-MCNC: 93 MG/DL (ref 70–99)
GLUCOSE SERPL-MCNC: 103 MG/DL (ref 70–99)
GRAM STAIN RESULT: ABNORMAL
GRAM STAIN RESULT: ABNORMAL
HCT VFR BLD AUTO: 34.5 % (ref 35–47)
HGB BLD-MCNC: 11.1 G/DL (ref 11.7–15.7)
MCH RBC QN AUTO: 32.9 PG (ref 26.5–33)
MCHC RBC AUTO-ENTMCNC: 32.2 G/DL (ref 31.5–36.5)
MCV RBC AUTO: 102 FL (ref 78–100)
MRSA DNA SPEC QL NAA+PROBE: NEGATIVE
PLATELET # BLD AUTO: 129 10E3/UL (ref 150–450)
POTASSIUM SERPL-SCNC: 4.6 MMOL/L (ref 3.4–5.3)
PRELIM TEST AFFECT FURTHER TEST SPEC: YES
PROT SERPL-MCNC: 5.9 G/DL (ref 6.4–8.3)
RBC # BLD AUTO: 3.37 10E6/UL (ref 3.8–5.2)
SA TARGET DNA: NEGATIVE
SODIUM SERPL-SCNC: 137 MMOL/L (ref 135–145)
SPECIMEN SOURCE SNV: ABNORMAL
VANCOMYCIN SERPL-MCNC: 15.6 UG/ML
WBC # BLD AUTO: 8 10E3/UL (ref 4–11)

## 2024-07-15 PROCEDURE — 99233 SBSQ HOSP IP/OBS HIGH 50: CPT | Mod: 24 | Performed by: STUDENT IN AN ORGANIZED HEALTH CARE EDUCATION/TRAINING PROGRAM

## 2024-07-15 PROCEDURE — 97535 SELF CARE MNGMENT TRAINING: CPT | Mod: GO | Performed by: OCCUPATIONAL THERAPIST

## 2024-07-15 PROCEDURE — 82040 ASSAY OF SERUM ALBUMIN: CPT | Performed by: INTERNAL MEDICINE

## 2024-07-15 PROCEDURE — 99232 SBSQ HOSP IP/OBS MODERATE 35: CPT | Performed by: INTERNAL MEDICINE

## 2024-07-15 PROCEDURE — 86140 C-REACTIVE PROTEIN: CPT | Performed by: STUDENT IN AN ORGANIZED HEALTH CARE EDUCATION/TRAINING PROGRAM

## 2024-07-15 PROCEDURE — 97530 THERAPEUTIC ACTIVITIES: CPT | Mod: GP

## 2024-07-15 PROCEDURE — 36415 COLL VENOUS BLD VENIPUNCTURE: CPT | Performed by: INTERNAL MEDICINE

## 2024-07-15 PROCEDURE — 250N000013 HC RX MED GY IP 250 OP 250 PS 637

## 2024-07-15 PROCEDURE — 250N000013 HC RX MED GY IP 250 OP 250 PS 637: Performed by: PHYSICIAN ASSISTANT

## 2024-07-15 PROCEDURE — 97110 THERAPEUTIC EXERCISES: CPT | Mod: GP

## 2024-07-15 PROCEDURE — 80202 ASSAY OF VANCOMYCIN: CPT | Performed by: INTERNAL MEDICINE

## 2024-07-15 PROCEDURE — 250N000012 HC RX MED GY IP 250 OP 636 PS 637: Performed by: PHYSICIAN ASSISTANT

## 2024-07-15 PROCEDURE — 250N000013 HC RX MED GY IP 250 OP 250 PS 637: Performed by: INTERNAL MEDICINE

## 2024-07-15 PROCEDURE — 250N000011 HC RX IP 250 OP 636

## 2024-07-15 PROCEDURE — 97530 THERAPEUTIC ACTIVITIES: CPT | Mod: GO | Performed by: OCCUPATIONAL THERAPIST

## 2024-07-15 PROCEDURE — 85027 COMPLETE CBC AUTOMATED: CPT | Performed by: INTERNAL MEDICINE

## 2024-07-15 PROCEDURE — 250N000011 HC RX IP 250 OP 636: Performed by: PHYSICIAN ASSISTANT

## 2024-07-15 PROCEDURE — 120N000002 HC R&B MED SURG/OB UMMC

## 2024-07-15 RX ORDER — LIDOCAINE 40 MG/G
CREAM TOPICAL
Status: DISCONTINUED | OUTPATIENT
Start: 2024-07-15 | End: 2024-07-16 | Stop reason: HOSPADM

## 2024-07-15 RX ORDER — VANCOMYCIN HYDROCHLORIDE
1250 EVERY 24 HOURS
Status: DISCONTINUED | OUTPATIENT
Start: 2024-07-15 | End: 2024-07-15

## 2024-07-15 RX ADMIN — ACETAMINOPHEN 975 MG: 325 TABLET, FILM COATED ORAL at 22:47

## 2024-07-15 RX ADMIN — GABAPENTIN 200 MG: 100 CAPSULE ORAL at 14:35

## 2024-07-15 RX ADMIN — METHOCARBAMOL 500 MG: 500 TABLET ORAL at 21:31

## 2024-07-15 RX ADMIN — ACETAMINOPHEN 975 MG: 325 TABLET, FILM COATED ORAL at 14:35

## 2024-07-15 RX ADMIN — METHOCARBAMOL 500 MG: 500 TABLET ORAL at 18:31

## 2024-07-15 RX ADMIN — POLYETHYLENE GLYCOL 3350 17 G: 17 POWDER, FOR SOLUTION ORAL at 21:15

## 2024-07-15 RX ADMIN — FAMOTIDINE 20 MG: 20 TABLET ORAL at 21:31

## 2024-07-15 RX ADMIN — OXYCODONE HYDROCHLORIDE 5 MG: 5 TABLET ORAL at 22:47

## 2024-07-15 RX ADMIN — METHOCARBAMOL 500 MG: 500 TABLET ORAL at 10:30

## 2024-07-15 RX ADMIN — OXYCODONE HYDROCHLORIDE 5 MG: 5 TABLET ORAL at 06:39

## 2024-07-15 RX ADMIN — Medication 112.5 MCG: at 10:30

## 2024-07-15 RX ADMIN — ONDANSETRON 4 MG: 4 TABLET, ORALLY DISINTEGRATING ORAL at 10:02

## 2024-07-15 RX ADMIN — CYCLOSPORINE 50 MG: 50 CAPSULE, LIQUID FILLED ORAL at 22:46

## 2024-07-15 RX ADMIN — ASPIRIN 81 MG CHEWABLE TABLET 162 MG: 81 TABLET CHEWABLE at 10:30

## 2024-07-15 RX ADMIN — ROSUVASTATIN CALCIUM 5 MG: 5 TABLET, COATED ORAL at 21:31

## 2024-07-15 RX ADMIN — SENNOSIDES AND DOCUSATE SODIUM 2 TABLET: 50; 8.6 TABLET ORAL at 10:30

## 2024-07-15 RX ADMIN — SENNOSIDES AND DOCUSATE SODIUM 2 TABLET: 50; 8.6 TABLET ORAL at 21:32

## 2024-07-15 RX ADMIN — CYCLOSPORINE 75 MG: 25 CAPSULE, LIQUID FILLED ORAL at 10:30

## 2024-07-15 RX ADMIN — POLYETHYLENE GLYCOL 3350 17 G: 17 POWDER, FOR SOLUTION ORAL at 08:07

## 2024-07-15 RX ADMIN — ACETAMINOPHEN 975 MG: 325 TABLET, FILM COATED ORAL at 06:29

## 2024-07-15 RX ADMIN — GABAPENTIN 200 MG: 100 CAPSULE ORAL at 21:31

## 2024-07-15 RX ADMIN — METHOCARBAMOL 500 MG: 500 TABLET ORAL at 14:35

## 2024-07-15 RX ADMIN — GABAPENTIN 200 MG: 100 CAPSULE ORAL at 10:30

## 2024-07-15 RX ADMIN — OXYCODONE HYDROCHLORIDE 10 MG: 5 TABLET ORAL at 09:57

## 2024-07-15 RX ADMIN — CEFTRIAXONE 2 G: 2 INJECTION, POWDER, FOR SOLUTION INTRAMUSCULAR; INTRAVENOUS at 18:31

## 2024-07-15 ASSESSMENT — ACTIVITIES OF DAILY LIVING (ADL)
ADLS_ACUITY_SCORE: 46
DEPENDENT_IADLS:: CLEANING;COOKING;LAUNDRY;SHOPPING;TRANSPORTATION
ADLS_ACUITY_SCORE: 46

## 2024-07-15 NOTE — PLAN OF CARE
Shift 0700 to 1930:    Goal Outcome Evaluation:      Plan of Care Reviewed With: patient    Overall Patient Progress: improving    Outcome Evaluation: Pt participating in therapies    Pt A&Ox4, denies SOB, CP, vomiting, diarrhea, and numbness. Pt continent of B&B, endorses constipation, LBM 7/11. Pt endorses nausea, managed with PRN Zofran x1. Pt endorses tingling in R knee as well as burning pain, managed with scheduled medications, ice, and PRN Oxycodone x1. VSS on R, 1L O2 NC overnight. Regular diet, takes medications whole with thin liquids. LUE x2 PIV SL. Ax1 walker/GB, WBAT to RLE. Skin intact except for R knee surgical incision, UTV, dressing CDI; and bruising on R upper arm from extravasation. Pt has call light in reach, calls appropriately, and has no unanswered questions or concerns at end of shift. Continue POC.

## 2024-07-15 NOTE — PHARMACY-TRANSPLANT NOTE
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      Planned immunosuppression regimen to include:   INDUCTION with: basiliximab on POD #1 and POD #4 due to poor kidney function which is defined as hemodialysis, SCr > 4 mg/dL or UO < 30 cc/hr and methylprednisolone/prednisone taper per liver transplant protocol.  MAINTENANCE to include mycophenolate and tacrolimus with initial goal trough levels of 5-10 mcg/L for 0-3 months post-transplant for patients with poor kidney function (defined as hemodialysis, SCr > 4 mg/dL or UO < 30 cc/hr).  Patient may continue prednisone at 5 mg PO daily until tacrolimus level is therapeutic.     Surgical prophylaxis includes: piperacillin-tazobactam IV and vancomycin for 48 hours.    Opportunistic pathogen prophylaxis includes: micafungin, valganciclovir, and will follow up for PCP prophylaxis.    Patient is not enrolled in medication study.    Pharmacy will monitor for medication interactions and immunosuppression levels in conjunction with the team. Medication therapy needs for discharge planning will continue to be addressed throughout the current admission via multidisciplinary rounds and order review.  Pharmacy will make recommendations as appropriate.    Jurgen Castelan, PharmD  PGY2 Infectious Disease Pharmacy Resident  2019 3:28 PM       Patient is requesting a call. She stated she hasn't had a period in 2 years and started bleeding heavily on Saturday night. She is wanting to know what she should do.

## 2024-07-15 NOTE — PHARMACY-VANCOMYCIN DOSING SERVICE
Pharmacy Vancomycin Note  Date of Service July 15, 2024  Patient's  1960   64 year old, female    Indication: Bone and Joint Infection  Day of Therapy: 24  Current vancomycin regimen: 1000 mg IV q24h  Current vancomycin monitoring method: AUC  Current vancomycin therapeutic monitoring goal: 400-600 mg*h/L    Insight[employer] Prediction of Current Vancomycin Regimen  Loading dose: N/A  Regimen: 1000 mg IV every 24 hours.  Exposure target: AUC24 (range)400-600 mg/L.hr   AUC24,ss: 372 mg/L.hr  Probability of AUC24 > 400: 27 %  Ctrough,ss: 9.7 mg/L  Probability of Ctrough,ss > 20: 7 %  Probability of nephrotoxicity (Lodise SRIRAM ): 6 %    Current estimated CrCl = Estimated Creatinine Clearance: 59.3 mL/min (A) (based on SCr of 1.07 mg/dL (H)).    Creatinine for last 3 days  2024:  6:20 AM Creatinine 0.89 mg/dL  2024:  6:45 AM Creatinine 0.91 mg/dL  7/15/2024:  6:48 AM Creatinine 1.07 mg/dL    Recent Vancomycin Levels (past 3 days)  7/15/2024:  6:48 AM Vancomycin 15.6 ug/mL    Vancomycin IV Administrations (past 72 hours)                     vancomycin (VANCOCIN) 1,000 mg in 200 mL dextrose intermittent infusion (mg) 1,000 mg New Bag 24 2110      Restarted 24 2321     1,000 mg New Bag  210    vancomycin (VANCOCIN) 2,000 mg in sodium chloride 0.9 % 500 mL intermittent infusion (mg) 2,000 mg New Bag 24                    Nephrotoxins and other renal medications (From now, onward)      Start     Dose/Rate Route Frequency Ordered Stop    07/15/24 1800  vancomycin (VANCOCIN) 1,250 mg in 0.9% NaCl 250 mL intermittent infusion         1,250 mg  166.7 mL/hr over 90 Minutes Intravenous EVERY 24 HOURS 07/15/24 1033      24 0800  cycloSPORINE modified (GENERIC EQUIVALENT) capsule 75 mg         75 mg Oral DAILY 24 1854      24 2000  cycloSPORINE modified (GENERIC EQUIVALENT) capsule 50 mg         50 mg Oral EVERY EVENING 24 1854                 Contrast Orders - past  72 hours (72h ago, onward)      None            Interpretation of levels and current regimen:  Vancomycin level is reflective of AUC less than 400    Has serum creatinine changed greater than 50% in last 72 hours: No    Urine output:  unable to determine    Renal Function:  might be worsening since slight increase today? Or is stable    InsightRX Prediction of Planned New Vancomycin Regimen  Loading dose: N/A  Regimen: 1250 mg IV every 24 hours.  Start time: 18:00 on 07/15/2024  Exposure target: AUC24 (range)400-600 mg/L.hr   AUC24,ss: 463 mg/L.hr  Probability of AUC24 > 400: 95 %  Ctrough,ss: 12.2 mg/L  Probability of Ctrough,ss > 20: 15 %  Probability of nephrotoxicity (Lodise SRIRAM 2009): 7 %    Plan:  Increase Dose to 1250mg IV Q24H. Considered increasing to 1500mg IV Q24H (predicted AUC of  553), but with creat increasing slightly opted for the 1250mg dose in case the creat continues to trend up.  Vancomycin monitoring method: AUC  Vancomycin therapeutic monitoring goal: 400-600 mg*h/L  Pharmacy will check vancomycin levels as appropriate in 1-3 Days.  Serum creatinine levels will be ordered every 48 hours.    Kera Boudreaux, FrankieD, BCPS

## 2024-07-15 NOTE — PROGRESS NOTES
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 Labs notable for leukocytosis of 14.7 with left shift. . SED 83. Right knee XR showed a moderate effusion with no evidence of malalignment, hardware loosening, or acute fracture.    Today's changes:   Pain overall better.  No acute medical concern.  Postop management per surgical team   Antibiotics per Infectious disease: Discontinue IV vancomycin.  Continue IV ceftriaxone.  Duration 6 weeks.  Will get PICC line placed.  PT, OT,  following    7/13/2024  Sp  Irrigation and debridement lower extremity, combined, Right - Leg; Polyethylene exchange, Right - Knee.by Dr Black. EBL: 150 ml.  Complications: None      #1 Right knee pain, concern for prosthetic knee infection  Orthopedic surgery consulted. Right knee aspirated with cloudy aspirate. Cell count with differential and aerobic/anaerobic cultures obtained. Sp I and D as above 7/13.   Post Op Mx per Ortho.   Resume regular diet.   PT/OT. Activity per ortho.   DVT PPx per Ortho.   Infectious disease consulted, appreciate input.  Antibiotics plan per infectious disease.  Continue IV vancomycin, IV ceftriaxone.  Follow-up IntraOp cultures.  Check A1c_5.    #2 NAFLD/ANGULO S/P Liver  "Transplantation   #3 Immunosuppression   #4 Thrombocytpenia   Hx of rejection treated with high dose steroids  Continue PTA monotherapy with Cyclosporine   Continue outpatient follow as indicated per GI  Platelets 99 (baseline 150)--monitor  Follow LFT.       #5 CKD III  Creatinine 1.13 on admission, at baseline     #6 Hypothyroidism  Most recent TSH 0.56 on 04/24/24  Continue PTA levothyroxine     #7 Hypertension  Controlled  Not on any antihypertensives     #8 Severe Obesity: Estimated body mass index is 40.97 kg/m  as calculated from the following:  Height as of this encounter: 1.575 m (5' 2\").  Weight as of this encounter: 101.6 kg (224 lb).                   Diet: Advance Diet as Tolerated: Regular Diet Adult    DVT Prophylaxis: Defer to orthopedic surgery  Forrester Catheter: Not present  Lines: None     Cardiac Monitoring: None  Code Status: Full Code      Clinically Significant Risk Factors              # Hypoalbuminemia: Lowest albumin = 2.6 g/dL at 7/15/2024  6:48 AM, will monitor as appropriate   # Thrombocytopenia: Lowest platelets = 129 in last 2 days, will monitor for bleeding   # Hypertension: Noted on problem list              # Severe Obesity: Estimated body mass index is 40.97 kg/m  as calculated from the following:    Height as of this encounter: 1.575 m (5' 2\").    Weight as of this encounter: 101.6 kg (224 lb)., PRESENT ON ADMISSION            Disposition Plan     Medically Ready for Discharge: Anticipated Tomorrow             Sterling Meier MD  Hospitalist Service, GOLD TEAM 17  [facility]  Securely message with [employer] (more info)  Text page via [facility] Paging/Directory   See signed in provider for up to date coverage information  ______________________________________________________________________    Interval History     Interval events reviewed. Doing better.  Hallucinations resolved, off ketamine.  Pain better controlled. No acute or new medical " concern.  No  vomiting or pain abdomen or chest pain or cough or shortness of breath.  No other concern    Physical Exam   Vital Signs: Temp: 97.5  F (36.4  C) Temp src: Oral BP: 121/66 Pulse: 95   Resp: 16 SpO2: 96 % O2 Device: None (Room air) Oxygen Delivery: 1 LPM  Weight: 224 lbs 0 oz      General Appearance: Awake, interactive, NAD  Respiratory: Normal work of breathing.   Cardiovascular: RRR  GI: Soft. NT. ND.  Extremities: Distally wwp. No pedal edema.  RLE on dressing.   Skin: No acute rash on exposed areas.   Neuro: Grossly non focal.   Others: Stable mood.      Medical Decision Making       45 MINUTES SPENT BY ME on the date of service doing chart review, history, exam, documentation & further activities per the note.      Data     I have personally reviewed the following data over the past 24 hrs:    8.0  \   11.1 (L)   / 129 (L)     137 103 17.8 /  103 (H)   4.6 28 1.07 (H) \     ALT: 15 AST: 29 AP: 123 TBILI: 0.5   ALB: 2.6 (L) TOT PROTEIN: 5.9 (L) LIPASE: N/A     Procal: N/A CRP: 248.35 (H) Lactic Acid: N/A         Imaging results reviewed over the past 24 hrs:   No results found for this or any previous visit (from the past 24 hour(s)).    Recent Labs   Lab 07/15/24  0648 07/15/24  0629 07/14/24  0645 07/14/24  0557 07/13/24  0620 07/12/24  1003   WBC 8.0  --   --   --  9.7 14.7*   HGB 11.1*  --  11.6*  --  11.1* 12.1   *  --   --   --  102* 99   *  --   --   --  89* 99*     --   --   --  138 133*   POTASSIUM 4.6  --   --   --  4.0 3.6   CHLORIDE 103  --   --   --  107 98   CO2 28  --   --   --  22 25   BUN 17.8  --   --   --  22.5 30.5*   CR 1.07*  --  0.91  --  0.89 1.13*   ANIONGAP 6*  --   --   --  9 10   JACOB 8.8  --   --   --  8.4* 8.7*   * 93  --  86 92 156*   ALBUMIN 2.6*  --   --   --   --  3.5   PROTTOTAL 5.9*  --   --   --   --  6.6   BILITOTAL 0.5  --   --   --   --  1.7*   ALKPHOS 123  --   --   --   --  98   ALT 15  --   --   --   --  25   AST 29  --   --   --    --  32

## 2024-07-15 NOTE — PROGRESS NOTES
"  Orthopaedic Surgery Daily Progress Note     Subjective:   Pain is controlled. Notes a burning discomfort at rest over the incision. Pain increases with movement. Has been able to ambulate to the bathroom. Tolerating diet. No urinary difficulties. No fevers or chills.     Working with PT and OT recommending discharge to home with additional sessions.     Physical Exam   /67 (BP Location: Right arm, Patient Position: Semi-Walsh's, Cuff Size: Adult Large)   Pulse 90   Temp 97.4  F (36.3  C) (Axillary)   Resp 18   Ht 1.575 m (5' 2\")   Wt 101.6 kg (224 lb)   SpO2 96%   BMI 40.97 kg/m      Intake/Output Summary (Last 24 hours) at 7/14/2024 0558  Last data filed at 7/14/2024 0100  Gross per 24 hour   Intake 1397 ml   Output 500 ml   Net 897 ml     General: Alert, well-appearing  in no acute distress.  Respiratory: Non-labored breathing.   Cardiovascular: Extremities warm and well perfused   Extremities: moving all four extremities.   RLE:  -Sens: SILT dp/sp/s/s/t  -Motor: 5/5 EHL/FHL/TA/GSc  -Vasc: 2+ pulses, wwp, brisk cap refill    Dressing CDI    Cultures positive for strep agalactiae    Labs    Complete Blood Count   Recent Labs   Lab 07/14/24  0645 07/13/24  0620 07/12/24  1003   WBC  --  9.7 14.7*   HGB 11.6* 11.1* 12.1   PLT  --  89* 99*     Basic Metabolic Panel  Recent Labs   Lab 07/14/24  0645 07/14/24  0557 07/13/24  0620 07/12/24  1003   NA  --   --  138 133*   POTASSIUM  --   --  4.0 3.6   CHLORIDE  --   --  107 98   CO2  --   --  22 25   BUN  --   --  22.5 30.5*   CR 0.91  --  0.89 1.13*   GLC  --  86 92 156*     Coagulation Profile  No lab results found in last 7 days.    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 Black. Cultures positive for group B strep.     Today:   - Pain control   - Follow cultures   - Pt and OT   - continue antibiotics and narrow " as able.      Activity: Up with assist  Weight bearing status: WBAT on the RLE   Antibiotics:  Broad spectrum until cultures  Diet: Regular. Bowel regimen. Anti-emetics PRN.    DVT prophylaxis: Mechanical +  mgstarting PODElevation: Elevate heels off of bed on pillows   Wound Care: Keep dressings in place until POD3  Drains: none  Pain management: Orals PRN, IV for breakthrough only  X-rays: AP and lateral Xrs of the knee in pacu  Physical Therapy: Mobilization, ROM, ADL's  Occupational Therapy: ADL's  Labs: Trend Hgb on POD #1  Consults: PT, OT, infectious disease  Disposition: TBD    --  Evert Saucedo MD  Orthopedic Surgery PGY-4

## 2024-07-15 NOTE — CONSULTS
Care Management Initial Consult    General Information  Assessment completed with: Patient,    Type of CM/SW Visit: Initial Assessment    Primary Care Provider verified and updated as needed: Yes (Wale Thurston [phone] [address] [phone])   Readmission within the last 30 days: no previous admission in last 30 days      Reason for Consult: discharge planning  Advance Care Planning: Advance Care Planning Reviewed: no concerns identified          Communication Assessment  Patient's communication style: spoken language (English or Bilingual)             Cognitive  Cognitive/Neuro/Behavioral: WDL  Level of Consciousness: alert                   Living Environment:   People in home: spouse     Current living Arrangements: house      Able to return to prior arrangements: yes       Family/Social Support:  Care provided by: self  Provides care for: no one  Marital Status:     Hugo       Description of Support System: Supportive    Support Assessment: Adequate family and caregiver support    Current Resources:   Patient receiving home care services: No     Community Resources: None  Equipment currently used at home: cane, straight, walker, rolling, shower chair, raised toilet seat (FWW, reacher)  Supplies currently used at home:      Employment/Financial:  Employment Status: retired        Financial Concerns: none   Referral to Financial Worker: No       Does the patient's insurance plan have a 3 day qualifying hospital stay waiver?  No    Lifestyle & Psychosocial Needs:  Social Determinants of Health     Food Insecurity: Not on file   Depression: Not at risk (2/29/2024)    PHQ-2     PHQ-2 Score: 0   Housing Stability: Not on file   Tobacco Use: Medium Risk (7/12/2024)    Patient History     Smoking Tobacco Use: Former     Smokeless Tobacco Use: Never     Passive Exposure: Not on file   Financial Resource Strain: Not on file   Alcohol Use: Not on file   Transportation Needs: Not on  file   Physical Activity: Not on file   Interpersonal Safety: Not on file   Stress: Not on file   Social Connections: Not on file   Health Literacy: Not on file       Functional Status:  Prior to admission patient needed assistance:   Dependent ADLs:: Ambulation-walker  Dependent IADLs:: Cleaning, Cooking, Laundry, Shopping, Transportation  Assesssment of Functional Status: Not at baseline with mobility    Mental Health Status:  Mental Health Status: No Current Concerns       Chemical Dependency Status:  Chemical Dependency Status: No Current Concerns             Values/Beliefs:  Spiritual, Cultural Beliefs, [employer] Practices, Values that affect care: yes          Values/Beliefs Comment: Yarsani    Additional Information:  Met with patient at bedside to discuss discharge planning. Patient will need six weeks IV ceftriaxone. Referral sent to [facility] and benefit check. Patient has insurance coverage for home IV antibiotic infusions. Patient has chosen to go with [facility]. Patient will need PICC and bedside teaching for IV antibiotic administration. Patient's  will provide transportation to home. If [facility] is unable to find skilled home nursing patient is agreeable to go to an outpatient center for lab draws and PICC cares. Patient's choices would be [facility], where patient already goes for lab draws, or [facility]. [facility] will continue to follow.    Juliet Cartwright RN, BSN  Care Coordinator, 5 Ortho  Phone [phone]  Pager [phone]

## 2024-07-15 NOTE — PROGRESS NOTES
Benefit Check    Patient Name /Room #: Nicci Pemberton, rm 546  : 1960  MRN: [id number]  Referral Source Name & Phone Number: Juliet Cartwright [facility] [phone]  Anticipated D/C date: 2024  Anticipated Therapy Type: 6 weeks IV Ceftriaxone.   Current Home Care Agency: No  Provider that will be following/signing orders post discharge:  Likely ID provider. Specific provider not known at this time.    Juliet Cartwright RN, BSN  Care Coordinator,  Ortho  Phone [phone]  Pager [phone]

## 2024-07-15 NOTE — CONSULTS
"Nephrology Chart Review    Asked to provide clearance for patient with \"CKD\" to receive a PICC for long-duration of IV antibiotics.     Patient's chart reviewed. She has a longstanding history of a serum creatinine of 1.0-1.2 mg/dl. This puts the patients eGFR at 50-70s. She is at her baseline currently.     UAs in recent years without albuminuria (last checked 2/2024).     Thus, her CKD is mild, and her risk for progression of CKD is overall only modest. Thus, a PICC is acceptable in such a case as this.     Nephrology will sign off.     Júnior Fuller MD  Nephrology  "

## 2024-07-15 NOTE — PLAN OF CARE
Goal Outcome Evaluation: S/P I/D with a Polyethylene exchange     Plan of Care Reviewed With: patient  Overall Patient Progress: improving     VS:     A/Ox4.  Temp: 97.4  F (36.3  C) Temp src: Axillary BP: 123/67 Pulse: 90   Resp: 18 SpO2: 96 % O2 Device: Nasal cannula Oxygen Delivery: 1 LPM    Output:     Voids spontaneously the bathroom.   Last BM on 7/11/24.  Bowel sound present in all quadrants and she is passing gas. Prune juice offered and she has scheduled Senna and Miralax.   Activity:     Ambulated to the bathroom 3x, WBAT on the RLE. Denied dizziness, light-headedness, SOB, chestpain.   Skin: Intact, no open area.   Pain:     Intermittent aching with burning sensation on the R knee that worsens with movement. Pain rated 4-6/10 wne in bed and 8/10 with activity controlled and was given with PRN Oxycodone and IV Dilaudid.   CMS: CMS intact to all extremities.   Dressing: RLE wrapped with ace bandage, c/d/i.   Diet: Regular diet and tolerated.   Dilaudid is causing her nausea.   IV Zofran given 1x prior to IV Dialudid.   LDA: 2 L PIV's patent and sa;line locked   Equipment: SCD to LLE.   Attached to continuous pulse oximeter.   Plan: Pain Mgt., PT/OT   Additional Info:     ID is following. Awaiting final Cx result.  Discharge TBD.

## 2024-07-15 NOTE — PROGRESS NOTES
[facility] ID SERVICE :  PROGRESS NOTE    Patient:  Nicci Pemberton   YOB: 1960, MRN: [id number]  Date of Visit: 07/15/2024  Date of Admission: 7/12/2024  Consult Requester: Fab White DO          Assessment and Recommendations:     ID Problem List:  Right knee PJI s/p I&D, polyethylene liner exchange 7/13/2024 7/13/2024 - intra-op cultures growing Strep agalactiae (Group B Strep)  7/12/2024 synovial fluid analysis TNC 87336, 96% neutrophils. 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 Reviewed OP findings - Cloudy synovial fluid. Multiple intra-op cultures are obtained. Noted growth of Strep agalactiae (Group B Strep) from synovial fluid and intra-op cultures. Recommend to stop iv vancomycin. Continue iv ceftriaxone as targeted antibiotic, for 6 weeks course. Afterwards will need suppressive oral antibiotic for 3-6 months, given retained hardware. Okay to place PICC line.       Recommendations:  Stop IV Vancomycin.   Continue IV ceftriaxone 2g daily  Duration of 6 weeks iv antibiotic, followed by suppressive regimen afterwards.  Okay to place PICC line  Discussed with RNCC regarding home antibiotic set up.     Recommendations are discussed with Orthopedic and Medicine teams.    ID team will continue to follow. Please reach out if any questions or concerns.     Total time spent during this encounter (chart review, documentation,  MDM, coordination of care): 50 minutes    Glendy Lemus MD   Infectious Diseases Staff Physician  Bala hemanth   07/15/2024         Interval History and Events:     Seen and examined - No new complaints. Notes subjective improvement, does not have fever, chills anymore. Right knee pain which has expected after surgery as reported by the patient.          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      Evaluated by Orthopedic team, had arthrocentesis yielding ~60cc of  very cloudy yellow synovial fluid. Fluid analysis showed 916 TNC, neutrophilic predominance. Gram stain showed GPC, pending culture. Underwent I&D, polyethylene liner exchange today. OP findings showed cloudy synovial fluid. Multiple intra-op cultures were obtained.      Patient was seen and examined at bedside in PACU.   As history provided by patient, does have intermittent right knee pain which attributes to underlying TKR. Though started excruciating pain w/o direct trauma/injury/falls about 3 days prior to admission. In addition, noted mild redness, warmth, swelling of right knee, with progressively worsening of symptoms that led to ER visit and admission. Also recalls, subjective fevers, shaking along with it.      Regarding history of left TKR (Sept 2021) and superficial infection s/p I&D (11/2021):  Cultures with polymicrobial growth: aerobic (Enterococcus faecalis (S ampicillin, vancomycin),  "Citrobacter koseri, MRSE, Str oralis), anaerobic (Anaerococcus spp, Peptoniphilus asaccharolyticus). Received multiple rotating antibiotics per orthopedic and never evaluated by ID at that time. Antibiotic history per documentation by Orthopedic consultation 7/12/2024: \"Keflex x 2 days switched to: Linezolid 600 mg p.o. twice daily x10 days, Vantin 200 mg p.o. twice daily x10 days. Then Vantin 200mg PO BID every day x 10days, linezolid 600mg PO BID daily x 10 days.\"     Former smoker, quit in 2011. No diabetes history, last A1c 2019.          Review of Systems:   Targeted 10 point ROS was completed with pertinent positives and negatives are detailed above.         Antimicrobial history:     Current:  Vancomycin, ceftriaxone 7/12 - present          Physical Examination:     Vital signs:  /66 (BP Location: Right arm, Patient Position: Semi-Walsh's, Cuff Size: Adult Large)   Pulse 95   Temp 97.5  F (36.4  C) (Oral)   Resp 16   Ht 1.575 m (5' 2\")   Wt 101.6 kg (224 lb)   SpO2 96%   BMI 40.97 kg/m      GENERAL: alert and no distress  NECK: no adenopathy, no asymmetry, masses, or scars  RESP: lungs clear to auscultation - no rales, rhonchi or wheezes  CV: regular rate and rhythm, normal S1 S2, no S3 or S4, no murmur, click or rub, no peripheral edema  ABDOMEN: soft, nontender, no hepatosplenomegaly, no masses and bowel sounds normal  MS: no gross musculoskeletal defects noted, no edema except RLE covered in ace wrap post surgery dressing.       Lines and devices:    PIV CDI, non-tender, no surrounding erythema.  External urinary catheter in place    Labs, Microbiology and Imaging studies reviewed.   Elevated CRP    7/12/2024 synovial fluid analysis: TNC 35516, 96% neutrophils.          Laboratory Data:       Microbiology:    Culture   Date Value Ref Range Status   07/13/2024 No anaerobic organisms isolated after 1 day  Preliminary   07/13/2024 No growth after 1 day  Preliminary   07/13/2024 (A)  Final    1+ " Streptococcus agalactiae (Group B Streptococcus)     Comment:     Susceptibilities done on previous cultures   07/13/2024 No anaerobic organisms isolated after 1 day  Preliminary   07/13/2024 No growth after 1 day  Preliminary   07/13/2024 Culture in progress  Preliminary   07/13/2024 (A)  Preliminary    1+ Streptococcus agalactiae (Group B Streptococcus)     Comment:     Susceptibilities done on previous cultures   07/13/2024 No anaerobic organisms isolated after 1 day  Preliminary   07/13/2024 No growth after 1 day  Preliminary   07/13/2024 Culture in progress  Preliminary   07/13/2024 (A)  Preliminary    1+ Streptococcus agalactiae (Group B Streptococcus)     Comment:     Susceptibilities done on previous cultures   07/12/2024 No growth after 2 days  Preliminary   07/12/2024 No growth after 2 days  Preliminary   07/12/2024 (A)  Preliminary    2+ Streptococcus agalactiae (Group B Streptococcus)   07/12/2024 No anaerobic organisms isolated after 2 days  Preliminary   11/10/2021 4+ Anaerococcus species (A)  Final     Comment:     Susceptibilities done on previous cultures   11/10/2021 2+ Peptoniphilus asaccharolyticus (A)  Final   11/10/2021 No Growth  Final   11/10/2021 No Growth  Final   11/10/2021 4+ Citrobacter koseri (A)  Final     Comment:     Susceptibilities done on previous cultures   11/10/2021 4+ Enterococcus faecalis (A)  Final     Comment:     Susceptibilities done on previous cultures   11/10/2021 4+ Streptococcus oralis (A)  Final     Comment:     Susceptibilities done on previous cultures   11/10/2021 4+ Citrobacter koseri (A)  Final     Comment:     Susceptibilities done on previous cultures   11/10/2021 Isolated in broth only Enterococcus faecalis (A)  Final     Comment:     On day 1 of incubation  Susceptibilities done on previous cultures   11/10/2021 4+ Anaerococcus species (A)  Final   11/10/2021 No Growth  Final   11/10/2021 4+ Citrobacter koseri (A)  Final   11/10/2021 4+ Enterococcus  faecalis (A)  Final   11/10/2021 4+ Staphylococcus epidermidis (A)  Final   11/10/2021 3+ Streptococcus oralis (A)  Final   10/13/2020 No Growth  Final   08/05/2019 No Growth  Final   08/02/2019   Final    No Salmonella, Shigella, Yersinia, or Campylobacter.   07/31/2019 No Growth  Final   04/14/2019 Mixture of urogenital organisms  Final   05/04/2018 CITROBACTER KOSERI (A)  Final     Comment:     >100,000 col/ml Citrobacter koseri     GS Culture   Date Value Ref Range Status   07/13/2024 See corresponding culture for results  Final   07/13/2024 See corresponding culture for results  Final   07/13/2024 See corresponding culture for results  Final     Inflammatory Markers:   Recent Labs   Lab Test 07/12/24  1003 12/10/20  0844 12/09/20  0637 12/08/20  0511 12/07/20  0559 12/06/20  0504 10/30/20  0649 10/28/20  1603 06/18/18  1024   SED 83*  --   --   --   --   --   --  100* 17   CRP  --  51.6* 87.0* 165.0* 210.0* 119.0* 54.7* 108.0* 8.9*     Urine Studies:    Recent Labs   Lab Test 02/20/24  1043 02/06/23  1041 01/13/22  1129 05/04/21  1115 12/05/20  1826 09/21/20  0947 08/27/20  0940   LEUKEST Trace* Negative Negative Trace* Negative Moderate* Trace*   WBCU 0-5 0-5  --  3  --  * 3     Hematology Studies:    Recent Labs   Lab Test 07/15/24  0648 07/14/24  0645 07/13/24  0620 07/12/24  1003 05/29/24  1030 02/20/24  1028 11/28/23  1613 04/06/21  1018 03/29/21  1040 03/27/21  0934 03/25/21  1325 01/07/21  1146 12/29/20  1108 12/11/20  0523 12/10/20  0844 12/09/20  0637   WBC 8.0  --  9.7 14.7* 5.2 4.5 6.0   < > 18.4* 4.7 5.4   < > 4.6   < > 5.7 5.9   ANEU  --   --   --   --   --   --   --   --  16.0* 3.1 3.2  --  3.0  --  4.1 3.9   AEOS  --   --   --   --   --   --   --   --  0.0 0.2 0.2  --  0.2  --  0.1 0.2   HGB 11.1* 11.6* 11.1* 12.1 13.3 13.2 13.1   < > 12.3 12.2 13.2   < > 12.0   < > 11.4* 10.6*   *  --  102* 99 100 97 101*   < > 98 97 97   < > 99   < > 99 99   *  --  89* 99* 153 148* 156   < >  155 127* 142*   < > 147*   < > 117* 106*    < > = values in this interval not displayed.      Metabolic Studies:   Recent Labs   Lab Test 07/15/24  0648 07/14/24  0645 07/13/24  0620 07/12/24  1003 05/29/24  1030 05/06/24  1034     --  138 133* 143 143   POTASSIUM 4.6  --  4.0 3.6 5.1 4.9   CHLORIDE 103  --  107 98 106 108*   CO2 28  --  22 25 25 25   BUN 17.8  --  22.5 30.5* 21.2 23.4*   CR 1.07* 0.91 0.89 1.13* 1.06* 1.08*   GFRESTIMATED 58* 70 72 54* 59* 57*     Hepatic Studies:   Recent Labs   Lab Test 07/15/24  0648 07/12/24  1003 05/29/24  1030 05/06/24  1034 04/02/24  1027 03/27/24  1321   BILITOTAL 0.5 1.7* 1.2 1.7* 1.5* 2.2*   ALKPHOS 123 98 96 94 90 93   ALBUMIN 2.6* 3.5 4.0 3.9 4.1 4.1   AST 29 32 37 28 33 35   ALT 15 25 21 16 19 18          Last check of C difficile  C Diff Toxin B PCR   Date Value Ref Range Status   09/16/2019 Positive (A) NEG^Negative Final     Comment:     Positive: Toxin producing Clostridium difficile target DNA sequences detected,   presumed positive for Clostridium difficile toxin B.  Clostridium difficile (Requires Enteric Isolation)  FDA approved assay performed using [employer] GeneXpert real-time PCR.  Called to  Cinthia Sommer Charge RN [email or web address] at 1236 on 9.16.19 rd.         CSF testing     Recent Labs   Lab Test 09/05/19  1549 09/05/19  1545   CWBC Test not performed. Criteria not met for second cell count. 1   CRBC Test not performed. Criteria not met for second cell count. 12*   CGLU  --  51   CTP  --  44       Last Pathology Report   Case Report   Date Value Ref Range Status   09/12/2023   Final    Surgical Pathology Report                         Case: [id number]                                  Authorizing Provider:  Marilynn Thomas MD      Collected:           09/12/2023 11:31 AM          Ordering Location:     [facility]          Received:            09/12/2023 12:34 PM                                 Dermatology Clinic                                                                                   [place]                                                                  Pathologist:           William Mccoy MD                                                      Specimens:   A) - Skin, Central lower back                                                                       B) - Skin, Right medial buttocks                                                            Clinical Information   Date Value Ref Range Status   2023   Final    post-menopausal       Final Diagnosis   Date Value Ref Range Status   2023   Final    A(1). Skin, Central lower back, shave:  - Intradermal melanocytic nevus - (see description)     B(2). Skin, Right medial buttocks, shave:  - Nevus lipomatosus superficialis - (see description)                  Imagin2024 XR Right knee:  IMPRESSION: Right total knee arthroplasty, satisfactory alignment. No signs of loosening. No acute fracture. Moderate joint effusion.     2024 US RLE:  IMPRESSION:  Negative for deep vein thrombosis.

## 2024-07-15 NOTE — PROGRESS NOTES
Therapy: IV abx  Insurance:[employer]      Pt has IV abx coverage with their [employer] plan-ded $500 ( met in full), coverage of 80/20, OOP $3500(met $827.33 at this time). Once OOP is fully met, coverage will be at 100%.      In reference to admission on 7/12/24 to check IV abx coverage    Please contact Intake with any questions, [phone] or In Basket pool, [facility] ([phone]).

## 2024-07-15 NOTE — PROGRESS NOTES
Home Infusion  Received referral from Juliet Cartwright RNCC for IV Rocephin.  Benefits verified.  Patient has [employer] and is covered 80% towards $3500.00 out of pocket maximum ($827.33 has been met).  Called and spoke with Nicci to review home infusion services, review benefits and offer choice of providers.  Patient would like to remain in the [facility] system and will use [facility] for home infusion.  Confirmed discharge address, phone, and emergency contact information. Patient denies recent illness (not related to pre-existing conditions) or travel in the house hold. Confirmed allergies. No home health agency has been seeing the patient.     Nicci is willing to learn and manage home IV therapy.  Questions answered.  [facility] will continue to follow until discharge and update pt once final orders are determined.    Thank you for the referral    Santy Cortez LPN, Coordinator   [facility]   [email or web address]  Office: [phone]

## 2024-07-16 ENCOUNTER — APPOINTMENT (OUTPATIENT)
Dept: PHYSICAL THERAPY | Facility: CLINIC | Age: 64
DRG: 486 | End: 2024-07-16
Payer: COMMERCIAL

## 2024-07-16 VITALS
TEMPERATURE: 98 F | RESPIRATION RATE: 16 BRPM | HEART RATE: 92 BPM | OXYGEN SATURATION: 94 % | SYSTOLIC BLOOD PRESSURE: 125 MMHG | BODY MASS INDEX: 41.22 KG/M2 | HEIGHT: 62 IN | WEIGHT: 224 LBS | DIASTOLIC BLOOD PRESSURE: 67 MMHG

## 2024-07-16 PROCEDURE — 272N000454 HC KIT, 3FR SV SINGLE LUMEN POWERPICC

## 2024-07-16 PROCEDURE — 97530 THERAPEUTIC ACTIVITIES: CPT | Mod: GP

## 2024-07-16 PROCEDURE — 250N000011 HC RX IP 250 OP 636: Performed by: PHYSICIAN ASSISTANT

## 2024-07-16 PROCEDURE — 250N000012 HC RX MED GY IP 250 OP 636 PS 637: Performed by: PHYSICIAN ASSISTANT

## 2024-07-16 PROCEDURE — 250N000013 HC RX MED GY IP 250 OP 250 PS 637: Performed by: PHYSICIAN ASSISTANT

## 2024-07-16 PROCEDURE — 99239 HOSP IP/OBS DSCHRG MGMT >30: CPT | Performed by: STUDENT IN AN ORGANIZED HEALTH CARE EDUCATION/TRAINING PROGRAM

## 2024-07-16 PROCEDURE — 250N000009 HC RX 250

## 2024-07-16 PROCEDURE — 250N000013 HC RX MED GY IP 250 OP 250 PS 637

## 2024-07-16 PROCEDURE — 999N000040 HC STATISTIC CONSULT NO CHARGE VASC ACCESS

## 2024-07-16 PROCEDURE — 272N000276 HC DEVICE 3FR SECURACATH

## 2024-07-16 PROCEDURE — 250N000011 HC RX IP 250 OP 636: Performed by: INTERNAL MEDICINE

## 2024-07-16 PROCEDURE — 2894A VOIDCORRECT: CPT | Performed by: STUDENT IN AN ORGANIZED HEALTH CARE EDUCATION/TRAINING PROGRAM

## 2024-07-16 PROCEDURE — 250N000013 HC RX MED GY IP 250 OP 250 PS 637: Performed by: INTERNAL MEDICINE

## 2024-07-16 PROCEDURE — 99233 SBSQ HOSP IP/OBS HIGH 50: CPT | Mod: 24 | Performed by: STUDENT IN AN ORGANIZED HEALTH CARE EDUCATION/TRAINING PROGRAM

## 2024-07-16 PROCEDURE — 36569 INSJ PICC 5 YR+ W/O IMAGING: CPT

## 2024-07-16 RX ORDER — MEPERIDINE HYDROCHLORIDE 25 MG/ML
25 INJECTION INTRAMUSCULAR; INTRAVENOUS; SUBCUTANEOUS EVERY 30 MIN PRN
OUTPATIENT
Start: 2024-07-17

## 2024-07-16 RX ORDER — METHYLPREDNISOLONE SODIUM SUCCINATE 125 MG/2ML
125 INJECTION, POWDER, LYOPHILIZED, FOR SOLUTION INTRAMUSCULAR; INTRAVENOUS
Start: 2024-07-17

## 2024-07-16 RX ORDER — HEPARIN SODIUM (PORCINE) LOCK FLUSH IV SOLN 100 UNIT/ML 100 UNIT/ML
5 SOLUTION INTRAVENOUS
OUTPATIENT
Start: 2024-07-17

## 2024-07-16 RX ORDER — METHOCARBAMOL 500 MG/1
500 TABLET, FILM COATED ORAL 4 TIMES DAILY
Qty: 20 TABLET | Refills: 0 | Status: SHIPPED | OUTPATIENT
Start: 2024-07-16 | End: 2024-07-29

## 2024-07-16 RX ORDER — LIDOCAINE 40 MG/G
CREAM TOPICAL
Status: DISCONTINUED | OUTPATIENT
Start: 2024-07-16 | End: 2024-07-16 | Stop reason: HOSPADM

## 2024-07-16 RX ORDER — OXYCODONE HYDROCHLORIDE 5 MG/1
5-10 TABLET ORAL
Qty: 30 TABLET | Refills: 0 | Status: SHIPPED | OUTPATIENT
Start: 2024-07-16 | End: 2024-07-22

## 2024-07-16 RX ORDER — ASPIRIN 81 MG/1
162 TABLET, CHEWABLE ORAL DAILY
Qty: 60 TABLET | Refills: 0 | Status: SHIPPED | OUTPATIENT
Start: 2024-07-17 | End: 2024-08-27

## 2024-07-16 RX ORDER — CEFTRIAXONE 2 G/1
2 INJECTION, POWDER, FOR SOLUTION INTRAMUSCULAR; INTRAVENOUS ONCE
Start: 2024-07-17 | End: 2024-07-17

## 2024-07-16 RX ORDER — DIPHENHYDRAMINE HYDROCHLORIDE 50 MG/ML
50 INJECTION INTRAMUSCULAR; INTRAVENOUS
Start: 2024-07-17

## 2024-07-16 RX ORDER — HEPARIN SODIUM,PORCINE 10 UNIT/ML
5-20 VIAL (ML) INTRAVENOUS DAILY PRN
OUTPATIENT
Start: 2024-07-17

## 2024-07-16 RX ORDER — ALBUTEROL SULFATE 90 UG/1
1-2 AEROSOL, METERED RESPIRATORY (INHALATION)
Start: 2024-07-17

## 2024-07-16 RX ORDER — EPINEPHRINE 1 MG/ML
0.3 INJECTION, SOLUTION, CONCENTRATE INTRAVENOUS EVERY 5 MIN PRN
OUTPATIENT
Start: 2024-07-17

## 2024-07-16 RX ORDER — POLYETHYLENE GLYCOL 3350 17 G/17G
17 POWDER, FOR SOLUTION ORAL DAILY
Qty: 510 G | Refills: 0 | Status: SHIPPED | OUTPATIENT
Start: 2024-07-16 | End: 2024-08-27

## 2024-07-16 RX ORDER — HEPARIN SODIUM,PORCINE 10 UNIT/ML
5-20 VIAL (ML) INTRAVENOUS EVERY 24 HOURS
Status: DISCONTINUED | OUTPATIENT
Start: 2024-07-16 | End: 2024-07-16 | Stop reason: HOSPADM

## 2024-07-16 RX ORDER — HEPARIN SODIUM,PORCINE 10 UNIT/ML
5-20 VIAL (ML) INTRAVENOUS
Status: DISCONTINUED | OUTPATIENT
Start: 2024-07-16 | End: 2024-07-16 | Stop reason: HOSPADM

## 2024-07-16 RX ORDER — AMOXICILLIN 250 MG
2 CAPSULE ORAL 2 TIMES DAILY PRN
Qty: 60 TABLET | Refills: 0 | Status: SHIPPED | OUTPATIENT
Start: 2024-07-16 | End: 2024-08-27

## 2024-07-16 RX ORDER — CEFTRIAXONE 2 G/1
2 INJECTION, POWDER, FOR SOLUTION INTRAMUSCULAR; INTRAVENOUS EVERY 24 HOURS
Refills: 0 | Status: ACTIVE
Start: 2024-07-14 | End: 2024-08-24

## 2024-07-16 RX ORDER — ALBUTEROL SULFATE 0.83 MG/ML
2.5 SOLUTION RESPIRATORY (INHALATION)
OUTPATIENT
Start: 2024-07-17

## 2024-07-16 RX ORDER — GABAPENTIN 100 MG/1
200 CAPSULE ORAL 3 TIMES DAILY
Qty: 30 CAPSULE | Refills: 0 | Status: SHIPPED | OUTPATIENT
Start: 2024-07-16 | End: 2024-07-29

## 2024-07-16 RX ADMIN — GABAPENTIN 200 MG: 100 CAPSULE ORAL at 14:01

## 2024-07-16 RX ADMIN — ACETAMINOPHEN 975 MG: 325 TABLET, FILM COATED ORAL at 06:47

## 2024-07-16 RX ADMIN — Medication 112.5 MCG: at 08:19

## 2024-07-16 RX ADMIN — OXYCODONE HYDROCHLORIDE 5 MG: 5 TABLET ORAL at 08:35

## 2024-07-16 RX ADMIN — CYCLOSPORINE 75 MG: 25 CAPSULE, LIQUID FILLED ORAL at 08:18

## 2024-07-16 RX ADMIN — GABAPENTIN 200 MG: 100 CAPSULE ORAL at 08:18

## 2024-07-16 RX ADMIN — METHOCARBAMOL 500 MG: 500 TABLET ORAL at 14:01

## 2024-07-16 RX ADMIN — OXYCODONE HYDROCHLORIDE 5 MG: 5 TABLET ORAL at 18:10

## 2024-07-16 RX ADMIN — ASPIRIN 81 MG CHEWABLE TABLET 162 MG: 81 TABLET CHEWABLE at 08:18

## 2024-07-16 RX ADMIN — SENNOSIDES AND DOCUSATE SODIUM 2 TABLET: 50; 8.6 TABLET ORAL at 08:18

## 2024-07-16 RX ADMIN — METHOCARBAMOL 500 MG: 500 TABLET ORAL at 08:19

## 2024-07-16 RX ADMIN — OXYCODONE HYDROCHLORIDE 5 MG: 5 TABLET ORAL at 14:01

## 2024-07-16 RX ADMIN — HEPARIN, PORCINE (PF) 10 UNIT/ML INTRAVENOUS SYRINGE 5 ML: at 17:34

## 2024-07-16 RX ADMIN — LIDOCAINE HYDROCHLORIDE ANHYDROUS 3 ML: 10 INJECTION, SOLUTION INFILTRATION at 10:32

## 2024-07-16 RX ADMIN — POLYETHYLENE GLYCOL 3350 17 G: 17 POWDER, FOR SOLUTION ORAL at 08:17

## 2024-07-16 RX ADMIN — BISACODYL 10 MG: 10 SUPPOSITORY RECTAL at 09:26

## 2024-07-16 RX ADMIN — CEFTRIAXONE 2 G: 2 INJECTION, POWDER, FOR SOLUTION INTRAMUSCULAR; INTRAVENOUS at 16:39

## 2024-07-16 ASSESSMENT — ACTIVITIES OF DAILY LIVING (ADL)
ADLS_ACUITY_SCORE: 45
ADLS_ACUITY_SCORE: 46
ADLS_ACUITY_SCORE: 45

## 2024-07-16 NOTE — PLAN OF CARE
Goal Outcome Evaluation: S/P I/D with a Polyethylene exchange     Plan of Care Reviewed With: patient  Overall Patient Progress: improving     VS:     A/Ox4.  Temp: 97.9  F (36.6  C) Temp src: Oral BP: 124/64 Pulse: 82   Resp: 18 SpO2: 93 % O2 Device: None (Room air)    Output:     Voids spontaneously the bathroom.   Last BM on 7/11/24.  Bowel sound present in all quadrants and she is passing gas. She has scheduled Senna and PRN Miralax given overnight.   Activity:     Ambulated to the bathroom 5x, WBAT on the RLE. Denied dizziness, light-headedness, SOB, chestpain.   Skin: Intact, no open area.   Pain:     Intermittent aching with burning sensation on the R knee that worsens with movement. Pain rated 3-6/10 when in bed and 8/10 with activity and was given with PRN Oxycodone and scheduled Tylenol.   CMS: CMS intact to all extremities.   Dressing: RLE wrapped with ace bandage, c/d/i.   Diet: Regular diet and tolerated.    LDA: L PIV patent and saline locked   Equipment: SCD to LLE.   Attached to continuous pulse oximeter.   Plan: Pain Mgt., PT/OT   Additional Info:     PICC placement today. Awaiting approval of home care services for IV administration of ABX.

## 2024-07-16 NOTE — PHARMACY
[facility]  Parenteral ANtibiotic Review at Departure from Acute Care Collaborative Note     Patient: Nicci Pemberton  MRN: [id number]  Allergies: Ciprofloxacin, Furosemide, Cefpodoxime, Food, Linezolid, Sulfa antibiotics, and Sulfamethoxazole-trimethoprim    Current Location:  [facility]  OPAT to be provided by: [facility]       Line Type: PICC    Diagnosis/Indications: Right knee PJI s/p I&D, polyethylene liner exchange  Organism(s): Group B Streptococcus  MRDO? No  Pending Cultures/Microbiological Tests: yes Finalization of blood, synovial fluid, and intra-op cultures from 7/12 and 7/13    Inpatient ID involved in developing OPAT plan: Yes - discharge OPAT plan has no changes from ID provider, Dr. Glendy Lemus, OPAT plan charted on 7/16/2024    Outpatient ID Follow-up: ID OPAT Clinic Referral Placed ([facility] Ph: [phone] and Fax: [phone]) - appointment scheduled (on 7/30 at 2pm with Dr. Andressa Harper)  Designated Provider: Dr. Glendy Lemus will follow-up labs until ID follow-up appointment    Antimicrobial Regimen / Route Anticipated  Duration Start Date Stop /  Reassess Date   Ceftriaxone 2 g every 24 hours/IV 6 weeks from liner exchange, to be followed by chronic suppression given retained hardware 7/14/2024 8/24/2024     Laboratory Tests and Monitoring Frequency: CBC with Diff, CMP, CRP Once Weekly    Imaging/Miscellaneous Monitoring: None    ID Pharmacist Interventions: None                          Karen Kendrick, PharmD  Pager: Bala (Adult AST Pharmacist on [facility])

## 2024-07-16 NOTE — PROGRESS NOTES
Nephrology has given clearance for PICC placement, peds VAS will plan to place PICC on 7/16 prior to discharge.

## 2024-07-16 NOTE — PROGRESS NOTES
Care Management Discharge Note    Discharge Date: 07/16/2024       Discharge Disposition: Home Care, Home Infusion    Discharge Services: Home Care, home infusion    [facility]  Phone # [phone]  Fax # [phone]     [facility](Start of Care 7/30/24)  [address]  [place] [address]  Phone: [phone]  Fax: [phone]    Discharge DME: None    Discharge Transportation: family or friend will provide    Private pay costs discussed: insurance costs co-pays    Does the patient's insurance plan have a 3 day qualifying hospital stay waiver?  No    PAS Confirmation Code:    Patient/family educated on Medicare website which has current facility and service quality ratings: no    Education Provided on the Discharge Plan: Yes  Persons Notified of Discharge Plans: patient  Patient/Family in Agreement with the Plan: yes    Handoff Referral Completed: Yes    Additional Information:  Met with patient at bedside to finalize discharge planning. PICC line placed today for home IV abx. Patient will be on a six week course of IV ceftriaxone 2gm daily. Patient had bedside teaching for IV antibiotic home administration by [facility] RN Liaison, Jenifer.    Patient will have her evening dose of ceftriaxone at the hospital prior to discharge. Patient's IV antibiotics to be delivered to patient in her hospital room prior to discharge this evening.    Patient will not have home care on 7/23/2024. Patient will go into [facility] for PICC line dressing change and labs on 7/23 at 1300. Therapy Plan completed.  Jul 23, 2024 1:00 PM  Lab Central with [place] FA[facility] TR[facility] LAB  [facility] ([facility]  [address]  [facility] [address]  [phone]             Jul 23, 2024 1:15 PM  Infusion Visit with [place] CANCER INFUSION NURSE, [place] INFUSION CHAIR  [facility]  [facility] ) [facility]  [address]  [facility] [address]  [phone]     Home care with skilled nursing will start on 7/30/2024, for PICC line cares and lab draws. Patient agreed to make copay for home skilled nursing, $33.60. Patient lives 100 miles from the nearest clinic or hospital. Patient will either do a home exercise program or schedule outpatient physical therapy. Provider to place order for outpatient physical therapy if applicable.     Patient's  to provide transportation to home at discharge. All questions answered. RNCC available as needed.    Juliet Cartwright RN, BSN  Care Coordinator, 5 Ortho  Phone [phone]  Pager [phone]

## 2024-07-16 NOTE — PLAN OF CARE
Physical Therapy Discharge Summary    Reason for therapy discharge:    All goals and outcomes met, no further needs identified.    Progress towards therapy goal(s). See goals on Care Plan in [facility] electronic health record for goal details.  Goals met    Therapy recommendation(s):    Continued therapy is recommended.  Rationale/Recommendations:  OP PT.

## 2024-07-16 NOTE — PROGRESS NOTES
"  Orthopaedic Surgery Daily Progress Note     Subjective:   Pain is controlled.  States that the pain in the knee is improving each and every day.  Has been able to ambulate to the bathroom without issue. Tolerating diet. No urinary difficulties. No fevers or chills.     Working with PT and OT recommending discharge to home.     Physical Exam   /64 (BP Location: Right arm, Patient Position: Semi-Walsh's, Cuff Size: Adult Large)   Pulse 82   Temp 97.9  F (36.6  C) (Oral)   Resp 18   Ht 1.575 m (5' 2\")   Wt 101.6 kg (224 lb)   SpO2 93%   BMI 40.97 kg/m      Intake/Output Summary (Last 24 hours) at 7/14/2024 0558  Last data filed at 7/14/2024 0100  Gross per 24 hour   Intake 1397 ml   Output 500 ml   Net 897 ml     General: Alert, well-appearing  in no acute distress.  Respiratory: Non-labored breathing.   Cardiovascular: Extremities warm and well perfused   Extremities: moving all four extremities.   RLE:  -Sens: SILT dp/sp/s/s/t  -Motor: 5/5 EHL/FHL/TA/GSc  -Vasc: 2+ pulses, wwp, brisk cap refill    Dressings taken down and wound is examined.  The wound is clean dry and intact without significant drainage or surrounding erythema.    Cultures positive for strep agalactiae    Labs    Complete Blood Count   Recent Labs   Lab 07/15/24  0648 07/14/24  0645 07/13/24  0620 07/12/24  1003   WBC 8.0  --  9.7 14.7*   HGB 11.1* 11.6* 11.1* 12.1   *  --  89* 99*     Basic Metabolic Panel  Recent Labs   Lab 07/15/24  0648 07/15/24  0629 07/14/24  0645 07/14/24  0557 07/13/24  0620 07/12/24  1003     --   --   --  138 133*   POTASSIUM 4.6  --   --   --  4.0 3.6   CHLORIDE 103  --   --   --  107 98   CO2 28  --   --   --  22 25   BUN 17.8  --   --   --  22.5 30.5*   CR 1.07*  --  0.91  --  0.89 1.13*   * 93  --  86 92 156*     Coagulation Profile  No lab results found in last 7 days.    Assessment/Plan:  Assessment/Plan: Nicci Pemberton is a right a right knee prosthetic joint infection status post " irritation and debridement with a poly exchanged on 7/13/2024 with Dr. Black. Cultures positive for group B strep.     ID recommending continued ceftriaxone and PICC line placement with a planned course of 6 weeks of antibiotics.     Today:   - Pt and OT   - continue antibiotics and narrow as able, planning for a 6-week course of ceftriaxone via a PICC line.     Activity: Up with assist  Weight bearing status: WBAT on the RLE   Antibiotics: Per infectious disease.  On ceftriaxone and planning for 6-week course.  Diet: Regular. Bowel regimen. Anti-emetics PRN.    DVT prophylaxis: Mechanical +  mgstarting Elevation: Elevate heels off of bed on pillows   Wound Care: Keep dressings in place until POD3  Drains: none  Pain management: Orals PRN, IV for breakthrough only  X-rays: AP and lateral Xrs of the knee in pacu  Physical Therapy: Mobilization, ROM, ADL's  Occupational Therapy: ADL's  Labs: Trend Hgb on POD #1  Consults: PT, OT, infectious disease  Disposition: Okay to discharge when has an antibiotic plan in place    --  Evert Saucedo MD  Orthopedic Surgery PGY-4

## 2024-07-16 NOTE — CONSULTS
"PICC placed in right upper extremity for anticipated 6 weeks of antibiotic therapy. Patient tolerated well and denies pain. Tip location verified at the cavoatrial junction (CAJ) using ECG technology. PICC is ready to use. To contact the [facility] team enter a \"nursing to consult for vascular access\" [id number] order in sofatronic.       "

## 2024-07-16 NOTE — PLAN OF CARE
VS: VSS, pt denied CP or SOB.   O2: Room air sat. > 90%.   Output: Voiding adequate amount in the bathroom.   Last BM: 07/16/24 after dulcolax suppository.   Activity: Up to bathroom and up walked with PT.    Skin: Intact except surgical incision.    Pain: Comfortably manageable with PRN medication.   Neuro: CMS and neuro intact to baseline.    Dressing: CDI.   Diet: Regular tolerating okay.    LDA: PICC hep. Locked between antibiotics.    Equipment: IV pole and personal belongings.    Plan: Discharge home today.    Additional Info:        DISCHARGE SUMMARY    Pt discharging to: Home.  Transportation: Family.  AVS given and discussed: Pt was given AVS and pt states understanding of content. Pt has no further questions.   Stoplight Tool given and discussed: Yes, no further questions.  Medications given: Yes, discussed. No further questions.   Belongings returned: Yes, ensured all belongings packed and sent with pt. No items in security.   Comments: pt understand discharge Plan.

## 2024-07-16 NOTE — PROGRESS NOTES
[facility]    Pt to discharge home with [facility] supplying IV ABX and CL supplies.     Met with patient at bedside. Patient appeared ready and able to learn/perform tasks for IV therapy. Educated patient how to administer IV Rocephin 2GM Q24H IVP. Using visual education tool; writer educated on proper hand hygiene, SAS method, pulse method for instilling saline, vigorous alcohol scrub for 15 seconds on top/sides of connector, connecting/disconnecting of IV abx, and how to push med over 10 minutes. Patient practiced connecting/disconnecting NS flush and 20ml syringe to practice extension set. With practice extension set and syringe, patient was able to demonstrate how to administer medication and flush line appropriately. Patient also verbally walked through the steps on how to administer IV ABX one more time to ensure she knew what to do tomorrow. Educated about supplies to be sent, storage of IV med, welcome folder and how/when to contact [facility]. Educated on s/s of infection at CL site. Informed patient that [facility] would change CL dressing in the home, as well as draw labs if ordered. [facility] unable to see patient until next Thursday or Friday, plan for patient to go into clinic or IC to have first PICC dressing changed. Provided [facility] brochure to patient. Patient stated she feels comfortable with administering the IV medication. Extension set and mesh sleeve left at bedside. Patient to get today's dose IP since she must wait for her supplies to arrive to the hospital Patient verbalized understanding. Instructed patient to call [facility] with questions and concerns. Answered all questions.  Verified allergies, insurance and address.     Patient ready to discharge from a [facility] standpoint.     Jenifer Tamayo RN, BSN   [facility]  [email or web address]  Cell: TANYA 8-5* [phone]  Office: 24/7* [phone]

## 2024-07-16 NOTE — PROCEDURES
[facility]    Single Lumen PICC Placement    Date/Time: 7/16/2024 10:42 AM    Performed by: Juany Weinberg RN  Authorized by: Indra Camilo MD  Indications: vascular access      UNIVERSAL PROTOCOL   Site Marked: Yes  Prior Images Obtained and Reviewed:  Yes  Required items: Required blood products, implants, devices and special equipment available    Patient identity confirmed:  Verbally with patient, arm band and hospital-assigned identification number  NA - No sedation, light sedation, or local anesthesia  Confirmation Checklist:  Patient's identity using two indicators, relevant allergies, procedure was appropriate and matched the consent or emergent situation and correct equipment/implants were available  Time out: Immediately prior to the procedure a time out was called    Universal Protocol: the Joint Commission Universal Protocol was followed    Preparation: Patient was prepped and draped in usual sterile fashion    ESBL (mL):  4     ANESTHESIA    Anesthesia:  Local infiltration  Local Anesthetic:  Lidocaine 1% without epinephrine  Anesthetic Total (mL):  2      SEDATION    Patient Sedated: No        Preparation: skin prepped with ChloraPrep  Skin prep agent: skin prep agent completely dried prior to procedure  Sterile barriers: maximum sterile barriers were used: cap, mask, sterile gown, sterile gloves, and large sterile sheet  Hand hygiene: hand hygiene performed prior to central venous catheter insertion  Type of line used: PICC  Catheter type: single lumen  Lumen type: power PICC and non-valved  Catheter size: 3 Fr  Brand: Bard  Lot number: [id number]  Placement method: venipuncture, MST, ultrasound and tip navigation system  Number of attempts: 1  Difficulty threading catheter: no  Successful placement: yes  Orientation: right  Catheter to Vein (%): 25  Location: basilic vein  Tip Location: SVC/RA Junction  : 9cm.  Extremity circumference: 40  Visible  "catheter length: 3  Total catheter length: 45  Dressing and securement: alcohol impregnated caps, blood cleaned with CHG, gloves changed prior to final dressing, subcutaneous anchor securement system, chlorhexidine disc applied and transparent securement dressing  Post procedure assessment: blood return through all ports, free fluid flow and placement verified by 3CG technology  PROCEDURE   Patient Tolerance:  Patient tolerated the procedure well with no immediate complicationsDescribe Procedure: PICC placed in right upper extremity for anticipated 6 weeks of antibiotic therapy. Patient tolerated well and denies pain. Tip location verified at the cavoatrial junction (CAJ) using ECG technology. PICC is ready to use. To contact the [facility] team enter a \"nursing to consult for vascular access\" [id number] order in [employer].     Disposal: sharps and needle count correct at the end of procedure, needles and guidewire disposed in sharps container        "

## 2024-07-16 NOTE — PROGRESS NOTES
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 Labs notable for leukocytosis of 14.7 with left shift. . SED 83. Right knee XR showed a moderate effusion with no evidence of malalignment, hardware loosening, or acute fracture.     New Today  - Constipated, trial for prn suppository   - Pending PICC placement  - Home infusion arranged appreciate care coordinator team  - IV abx per ID   - No medical concerns      Today's changes:   Pain overall better.  No acute medical concern.  Postop management per surgical team   Antibiotics per Infectious disease: Discontinue IV vancomycin.  Continue IV ceftriaxone.  Duration 6 weeks.  Will get PICC line placed.  PT, OT,  following     7/13/2024  Sp  Irrigation and debridement lower extremity, combined, Right - Leg; Polyethylene exchange, Right - Knee.by Dr Black. EBL: 150 ml.  Complications: None      #1 Right knee pain, concern for prosthetic knee infection  Orthopedic surgery consulted. Right knee aspirated with cloudy aspirate. Cell count with differential and aerobic/anaerobic cultures obtained. Sp I and D as above 7/13.   Post Op Mx per Ortho.   Resume regular diet.   PT/OT. Activity per ortho.   DVT PPx per Ortho.   Infectious disease consulted,  "appreciate input.  Antibiotics plan per infectious disease.  Continue IV vancomycin, IV ceftriaxone.  Follow-up IntraOp cultures.  Check A1c_5.     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       #5 CKD III  Creatinine 1.13 on admission, at baseline     #6 Hypothyroidism  Most recent TSH 0.56 on 04/24/24  Continue PTA levothyroxine     #7 Hypertension  Controlled  Not on any antihypertensives     #8 Severe Obesity: Estimated body mass index is 40.97 kg/m  as calculated from the following:  Height as of this encounter: 1.575 m (5' 2\").  Weight as of this encounter: 101.6 kg (224 lb).               Diet: Advance Diet as Tolerated: Regular Diet Adult    DVT Prophylaxis: Defer to primary service  Forrester Catheter: Not present  Lines: PRESENT      PICC 07/16/24 Single Lumen Right Basilic ABX-Site Assessment: WDL      Cardiac Monitoring: None  Code Status: Full Code      Clinically Significant Risk Factors              # Hypoalbuminemia: Lowest albumin = 2.6 g/dL at 7/15/2024  6:48 AM, will monitor as appropriate   # Thrombocytopenia: Lowest platelets = 129 in last 2 days, will monitor for bleeding   # Hypertension: Noted on problem list              # Severe Obesity: Estimated body mass index is 40.97 kg/m  as calculated from the following:    Height as of this encounter: 1.575 m (5' 2\").    Weight as of this encounter: 101.6 kg (224 lb)., PRESENT ON ADMISSION     # Financial/Environmental Concerns: none         Disposition Plan     Medically Ready for Discharge: Ready Now             Indra Camilo MD  Hospitalist Service, GOLD TEAM 17  [facility]  Securely message with [employer] (more info)  Text page via [email or web address] Paging/Directory   See signed in " provider for up to date coverage information  ______________________________________________________________________    Interval History   Patient seen and examined. No acute events overnight. She states she has not had a BM in one week. She expresses interest in enema. No other complaints.    Physical Exam   Vital Signs: Temp: 97.9  F (36.6  C) Temp src: Oral BP: 114/71 Pulse: 80   Resp: 18 SpO2: 94 % O2 Device: None (Room air)    Weight: 224 lbs 0 oz    General Appearance: Appears comfortable.  Respiratory: Without wheezes rhonchi or rales.  CTA  Cardiovascular: RRR, without murmurs  GI: Soft, nontender, plus BS  Skin: Without obvious bleeding, bruising or excoriationsm      Medical Decision Making       59 MINUTES SPENT BY ME on the date of service doing chart review, history, exam, documentation & further activities per the note.      Data   ------------------------- PAST 24 HR DATA REVIEWED -----------------------------------------------

## 2024-07-16 NOTE — PROGRESS NOTES
[facility] :  PROGRESS NOTE    Patient:  Nicci Pemberton   YOB: 1960, MRN: [id number]  Date of Visit: 07/16/2024  Date of Admission: 7/12/2024  Consult Requester: Fab White DO          Assessment and Recommendations:     ID Problem List:  Right knee PJI s/p I&D, polyethylene liner exchange 7/13/2024 7/13/2024 - intra-op cultures growing Strep agalactiae (Group B Strep)  7/12/2024 synovial fluid analysis TNC 79426, 96% neutrophils. 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 Reviewed OP findings - Cloudy synovial fluid. Multiple intra-op cultures are obtained. Noted growth of Strep agalactiae (Group B Strep) from synovial fluid and intra-op cultures. Recommend iv ceftriaxone as targeted antibiotic, for 6 weeks course. Afterwards will need suppressive oral antibiotic for 3-6 months, given retained hardware.     Recommendations:  Continue IV ceftriaxone 2g daily  Duration of 6 weeks iv antibiotic, followed by suppressive oral antibiotic afterwards.  Follow up cultures until finalize  Outpatient follow up with transplant ID provider  See OPAT note/recommendations below    ============================================  Primary team:  Place order panel  OPAT Pharmacy ([facility]) Review and ID Care Transition   Place imaging order(s) requested above prior to discharge.  Contact ID teams about missed ID  "clinic appointments and/or ongoing therapy needs.    Prolonged Parenteral/Oral Antibiotic Recommendations and ID Follow up  This template provides final ID recommendations as of this date.     Infectious Diseases Indication: Right knee PJI s/p I&D, polyethylene liner exchange, Group B Streptococci    Antibiotic Information  Name of Antibiotic Dose of Antibiotic1 Anticipated duration Effective start date2 End date     IV ceftriaxone    2g daily    6 weeks    7/14/2024 8/24/2024                 1.Dose of antibiotic will need to be renally adjusted if creatinine clearance changes  2.Effective start date is the date of adequate therapy with appropriate spectrum    Method of antibiotic delivery:PICC line.Is the line being used for another indication besides antimicrobials? No At the end of therapy should the line used for antimicrobials be removed or de-accessed? Yes. Selecting \"yes\" will function as written order to remove PICC line or de-access the indwelling line at the end of therapy.    Weekly labs required: CBC with diff, CMP, and CRP. Dr. Lemus will follow labs at discharge until ID follow up. Fax labs to ID clinic.    Are there pending microbial tests: Yes Cultures     We will attempt to make OPAT appointments within 2 weeks of discharge based on clinic availability.  Provider: Next available Transplant ID Provider, timing of visit  within 2-3 weeks of discharge , and the type of clinic appointment visit Okay for in person or a virtual visit.   [facility] if ID follow up is need. [facility] ID academic group is not credentialed there.    ID provider route note: OPAT RN Care Coordinator [facility] ID Clinic Information:  Phone: [phone]  Fax: [phone] (Attention ID clinic nurses)    ============================================    Recommendations are discussed with primary team.    ID team " will continue to follow. Please reach out if any questions or concerns.     Total time spent during this encounter (chart review, documentation, MDM, coordination of care): 50 minutes    Glendy Lemus MD   Infectious Diseases Staff Physician  [facility] saxena   07/16/2024         Interval History and Events:     Seen and examined - No new complaints today. Just completed a PT session. Has questions about PICC line care, and administration of antibiotic at home - yet pending education in this regard, as PICC line was placed just this morning.          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      Evaluated by Orthopedic team, had arthrocentesis yielding ~60cc of  very cloudy yellow synovial fluid. Fluid analysis showed 916 TNC, neutrophilic predominance. Gram stain showed GPC, pending culture. Underwent I&D, polyethylene liner exchange today. OP findings showed cloudy synovial fluid. Multiple intra-op cultures were obtained.      Patient was seen and examined at bedside in PACU.   As history provided by patient, does have intermittent right knee pain which attributes to underlying TKR. Though started excruciating pain w/o direct trauma/injury/falls about 3 days prior to admission. In addition, noted mild redness, warmth, swelling of right knee, with progressively worsening of symptoms that led to ER visit and admission. Also recalls, subjective fevers, shaking along  "with it.      Regarding history of left TKR (Sept 2021) and superficial infection s/p I&D (11/2021):  Cultures with polymicrobial growth: aerobic (Enterococcus faecalis (S ampicillin, vancomycin), Citrobacter koseri, MRSE, Str oralis), anaerobic (Anaerococcus spp, Peptoniphilus asaccharolyticus). Received multiple rotating antibiotics per orthopedic and never evaluated by ID at that time. Antibiotic history per documentation by Orthopedic consultation 7/12/2024: \"Keflex x 2 days switched to: Linezolid 600 mg p.o. twice daily x10 days, Vantin 200 mg p.o. twice daily x10 days. Then Vantin 200mg PO BID every day x 10days, linezolid 600mg PO BID daily x 10 days.\"     Former smoker, quit in 2011. No diabetes history, last A1c 2019.          Review of Systems:   Targeted 10 point ROS was completed with pertinent positives and negatives are detailed above.         Antimicrobial history:     Current:  Vancomycin, ceftriaxone 7/12 - present          Physical Examination:     Vital signs:  /71   Pulse 80   Temp 97.9  F (36.6  C) (Oral)   Resp 18   Ht 1.575 m (5' 2\")   Wt 101.6 kg (224 lb)   SpO2 94%   BMI 40.97 kg/m      GENERAL: alert and no distress  NECK: no adenopathy, no asymmetry, masses, or scars  RESP: lungs clear to auscultation - no rales, rhonchi or wheezes  CV: regular rate and rhythm, normal S1 S2, no S3 or S4, no murmur, click or rub, no peripheral edema  ABDOMEN: soft, nontender, no hepatosplenomegaly, no masses and bowel sounds normal  MS: no gross musculoskeletal defects noted, no edema except RLE covered in ace wrap post surgery dressing.     Lines and devices:    PIV CDI, non-tender, no surrounding erythema.  External urinary catheter in place    Labs, Microbiology and Imaging studies reviewed.   Elevated CRP    7/12/2024 synovial fluid analysis: TNC 82331, 96% neutrophils.          Laboratory Data:       Microbiology:    Culture   Date Value Ref Range Status   07/13/2024 No anaerobic organisms " isolated after 2 days  Preliminary   07/13/2024 No growth after 2 days  Preliminary   07/13/2024 (A)  Final    1+ Streptococcus agalactiae (Group B Streptococcus)     Comment:     Susceptibilities done on previous cultures   07/13/2024 No anaerobic organisms isolated after 2 days  Preliminary   07/13/2024 No growth after 2 days  Preliminary   07/13/2024 Culture in progress  Preliminary   07/13/2024 (A)  Preliminary    1+ Streptococcus agalactiae (Group B Streptococcus)     Comment:     Susceptibilities done on previous cultures   07/13/2024 No anaerobic organisms isolated after 2 days  Preliminary   07/13/2024 No growth after 2 days  Preliminary   07/13/2024 Culture in progress  Preliminary   07/13/2024 (A)  Preliminary    1+ Streptococcus agalactiae (Group B Streptococcus)     Comment:     Susceptibilities done on previous cultures   07/12/2024 No growth after 3 days  Preliminary   07/12/2024 No growth after 3 days  Preliminary   07/12/2024 (A)  Final    2+ Streptococcus agalactiae (Group B Streptococcus)   07/12/2024 No anaerobic organisms isolated after 3 days  Preliminary   11/10/2021 4+ Anaerococcus species (A)  Final     Comment:     Susceptibilities done on previous cultures   11/10/2021 2+ Peptoniphilus asaccharolyticus (A)  Final   11/10/2021 No Growth  Final   11/10/2021 No Growth  Final   11/10/2021 4+ Citrobacter koseri (A)  Final     Comment:     Susceptibilities done on previous cultures   11/10/2021 4+ Enterococcus faecalis (A)  Final     Comment:     Susceptibilities done on previous cultures   11/10/2021 4+ Streptococcus oralis (A)  Final     Comment:     Susceptibilities done on previous cultures   11/10/2021 4+ Citrobacter koseri (A)  Final     Comment:     Susceptibilities done on previous cultures   11/10/2021 Isolated in broth only Enterococcus faecalis (A)  Final     Comment:     On day 1 of incubation  Susceptibilities done on previous cultures   11/10/2021 4+ Anaerococcus species (A)  Final    11/10/2021 No Growth  Final   11/10/2021 4+ Citrobacter koseri (A)  Final   11/10/2021 4+ Enterococcus faecalis (A)  Final   11/10/2021 4+ Staphylococcus epidermidis (A)  Final   11/10/2021 3+ Streptococcus oralis (A)  Final   10/13/2020 No Growth  Final   08/05/2019 No Growth  Final   08/02/2019   Final    No Salmonella, Shigella, Yersinia, or Campylobacter.   07/31/2019 No Growth  Final   04/14/2019 Mixture of urogenital organisms  Final   05/04/2018 CITROBACTER KOSERI (A)  Final     Comment:     >100,000 col/ml Citrobacter koseri     GS Culture   Date Value Ref Range Status   07/13/2024 See corresponding culture for results  Final   07/13/2024 See corresponding culture for results  Final   07/13/2024 See corresponding culture for results  Final     Inflammatory Markers:   Recent Labs   Lab Test 07/12/24  1003 12/10/20  0844 12/09/20  0637 12/08/20  0511 12/07/20  0559 12/06/20  0504 10/30/20  0649 10/28/20  1603 06/18/18  1024   SED 83*  --   --   --   --   --   --  100* 17   CRP  --  51.6* 87.0* 165.0* 210.0* 119.0* 54.7* 108.0* 8.9*     Urine Studies:    Recent Labs   Lab Test 02/20/24  1043 02/06/23  1041 01/13/22  1129 05/04/21  1115 12/05/20  1826 09/21/20  0947 08/27/20  0940   LEUKEST Trace* Negative Negative Trace* Negative Moderate* Trace*   WBCU 0-5 0-5  --  3  --  * 3     Hematology Studies:    Recent Labs   Lab Test 07/15/24  0648 07/14/24  0645 07/13/24  0620 07/12/24  1003 05/29/24  1030 02/20/24  1028 11/28/23  1613 04/06/21  1018 03/29/21  1040 03/27/21  0934 03/25/21  1325 01/07/21  1146 12/29/20  1108 12/11/20  0523 12/10/20  0844 12/09/20  0637   WBC 8.0  --  9.7 14.7* 5.2 4.5 6.0   < > 18.4* 4.7 5.4   < > 4.6   < > 5.7 5.9   ANEU  --   --   --   --   --   --   --   --  16.0* 3.1 3.2  --  3.0  --  4.1 3.9   AEOS  --   --   --   --   --   --   --   --  0.0 0.2 0.2  --  0.2  --  0.1 0.2   HGB 11.1* 11.6* 11.1* 12.1 13.3 13.2 13.1   < > 12.3 12.2 13.2   < > 12.0   < > 11.4* 10.6*   MCV  102*  --  102* 99 100 97 101*   < > 98 97 97   < > 99   < > 99 99   *  --  89* 99* 153 148* 156   < > 155 127* 142*   < > 147*   < > 117* 106*    < > = values in this interval not displayed.      Metabolic Studies:   Recent Labs   Lab Test 07/15/24  0648 07/14/24  0645 07/13/24  0620 07/12/24  1003 05/29/24  1030 05/06/24  1034     --  138 133* 143 143   POTASSIUM 4.6  --  4.0 3.6 5.1 4.9   CHLORIDE 103  --  107 98 106 108*   CO2 28  --  22 25 25 25   BUN 17.8  --  22.5 30.5* 21.2 23.4*   CR 1.07* 0.91 0.89 1.13* 1.06* 1.08*   GFRESTIMATED 58* 70 72 54* 59* 57*     Hepatic Studies:   Recent Labs   Lab Test 07/15/24  0648 07/12/24  1003 05/29/24  1030 05/06/24  1034 04/02/24  1027 03/27/24  1321   BILITOTAL 0.5 1.7* 1.2 1.7* 1.5* 2.2*   ALKPHOS 123 98 96 94 90 93   ALBUMIN 2.6* 3.5 4.0 3.9 4.1 4.1   AST 29 32 37 28 33 35   ALT 15 25 21 16 19 18          Last check of C difficile  C Diff Toxin B PCR   Date Value Ref Range Status   09/16/2019 Positive (A) NEG^Negative Final     Comment:     Positive: Toxin producing Clostridium difficile target DNA sequences detected,   presumed positive for Clostridium difficile toxin B.  Clostridium difficile (Requires Enteric Isolation)  FDA approved assay performed using [employer] GeneXpert real-time PCR.  Called to  Cinthia Sommer Charge RN [id number] at 1236 on 9.16.19 rd.         CSF testing     Recent Labs   Lab Test 09/05/19  1549 09/05/19  1545   CWBC Test not performed. Criteria not met for second cell count. 1   CRBC Test not performed. Criteria not met for second cell count. 12*   CGLU  --  51   CTP  --  44       Last Pathology Report   Case Report   Date Value Ref Range Status   09/12/2023   Final    Surgical Pathology Report                         Case: [id number]                                  Authorizing Provider:  Marilynn Thomas MD      Collected:           09/12/2023 11:31 AM          Ordering Location:     [facility]          Received:             2023 12:34 PM                                 Dermatology Clinic                                                                                  [place]                                                                  Pathologist:           William Mccoy MD                                                      Specimens:   A) - Skin, Central lower back                                                                       B) - Skin, Right medial buttocks                                                            Clinical Information   Date Value Ref Range Status   2023   Final    post-menopausal       Final Diagnosis   Date Value Ref Range Status   2023   Final    A(1). Skin, Central lower back, shave:  - Intradermal melanocytic nevus - (see description)     B(2). Skin, Right medial buttocks, shave:  - Nevus lipomatosus superficialis - (see description)                  Imagin2024 XR Right knee:  IMPRESSION: Right total knee arthroplasty, satisfactory alignment. No signs of loosening. No acute fracture. Moderate joint effusion.     2024 US RLE:  IMPRESSION:  Negative for deep vein thrombosis.

## 2024-07-17 LAB
BACTERIA BLD CULT: NO GROWTH
BACTERIA BLD CULT: NO GROWTH

## 2024-07-17 NOTE — PROGRESS NOTES
OPAT to be provided by: [facility]       Line Type: PICC     Diagnosis/Indications: Right knee PJI s/p I&D, polyethylene liner exchange  Organism(s): Group B Streptococcus  MRDO? No  Pending Cultures/Microbiological Tests: yes Finalization of blood, synovial fluid, and intra-op cultures from 7/12 and 7/13     Inpatient ID involved in developing OPAT plan: Yes - discharge OPAT plan has no changes from ID provider, Dr. Glendy Lemus, OPAT plan charted on 7/16/2024     Outpatient ID Follow-up: ID OPAT Clinic Referral Placed ([facility] ID Clinic Ph: [phone] and Fax: [phone]) - appointment scheduled (on 7/30 at 2pm with Dr. Andressa Harper)  Designated Provider: Dr. Glendy Lemus will follow-up labs until ID follow-up appointment     Antimicrobial Regimen / Route Anticipated  Duration Start Date Stop /  Reassess Date   Ceftriaxone 2 g every 24 hours/IV 6 weeks from liner exchange, to be followed by chronic suppression given retained hardware 7/14/2024 8/24/2024      Laboratory Tests and Monitoring Frequency: CBC with Diff, CMP, CRP Once Weekly     Imaging/Miscellaneous Monitoring: None     ID Pharmacist Interventions: None                           Karen Kendrick, PharmD  Pager: [facility] (Adult AST Pharmacist on [facility])    Prolonged Parenteral/Oral Antibiotic Recommendations and ID Follow up  This template provides final ID recommendations as of this date.      Infectious Diseases Indication: Right knee PJI s/p I&D, polyethylene liner exchange, Group B Streptococci     Antibiotic Information  Name of Antibiotic Dose of Antibiotic1 Anticipated duration Effective start date2 End date     IV ceftriaxone    2g daily    6 weeks    7/14/2024 8/24/2024                           1.Dose of antibiotic will need to be renally adjusted if creatinine clearance changes  2.Effective start date is the date of adequate therapy with appropriate spectrum     Method of antibiotic delivery:PICC line.Is  "the line being used for another indication besides antimicrobials? No At the end of therapy should the line used for antimicrobials be removed or de-accessed? Yes. Selecting \"yes\" will function as written order to remove PICC line or de-access the indwelling line at the end of therapy.     Weekly labs required: CBC with diff, CMP, and CRP. Dr. Lemus will follow labs at discharge until ID follow up. Fax labs to ID clinic.     Are there pending microbial tests: Yes Cultures      We will attempt to make OPAT appointments within 2 weeks of discharge based on clinic availability.  Provider: Next available Transplant ID Provider, timing of visit  within 2-3 weeks of discharge , and the type of clinic appointment visit Okay for in person or a virtual visit.   Patients discharged to [facility] will be seen by [facility] Infectious Diseases group if ID follow up is need. [facility] ID academic group is not credentialed there.     ID provider route note: OPAT RN Care Coordinator [facility] ID Clinic Information:  Phone: [phone]  Fax: [phone] (Attention ID clinic nurses)     "

## 2024-07-17 NOTE — PROGRESS NOTES
Care Management Follow Up    Length of Stay (days): 4    Expected Discharge Date: 07/16/2024      Additional Information:  Received call from [facility]. They are able to accept patient on service this week. Patient will not need to go into [place], [place] for PICC line care and lab draws. Patient will be having outpatient PT at [facility]. Orders faxed to [facility] to forward the orders. RNCC available as needed.    Juliet Cartwright RN, BSN  Care Coordinator, 5 Ortho  Phone [phone]  Pager [phone]

## 2024-07-18 LAB
BACTERIA SNV CULT: ABNORMAL
BACTERIA TISS BX CULT: ABNORMAL

## 2024-07-20 LAB
BACTERIA TISS BX CULT: NORMAL
BACTERIA TISS BX CULT: NORMAL

## 2024-07-22 DIAGNOSIS — Z98.890 S/P DEBRIDEMENT: ICD-10-CM

## 2024-07-22 RX ORDER — OXYCODONE HYDROCHLORIDE 5 MG/1
5-10 TABLET ORAL
Qty: 30 TABLET | Refills: 0 | Status: SHIPPED | OUTPATIENT
Start: 2024-07-22 | End: 2024-08-27

## 2024-07-22 NOTE — TELEPHONE ENCOUNTER
.Medication Refill Request    Has the patient contacted the pharmacy for the refill? Yes   Name of medication being requested: oxyCODONE (ROXICODONE) 5 MG tablet   Provider who prescribed the medication: Cristian Brandt APRN [facility]   Pharmacy: [facility] in [place]  Date medication is needed: Patient is on last dose of medication today. She is still having pain and needs a refill or a prescription for something else to help with pain management.     Patient was told she would be getting a call but has not yet heard back to schedule a post op appointment    Could we send this information to you in [employer] or would you prefer to receive a phone call?:   Patient would like to be contacted via [employer]  Or call

## 2024-07-22 NOTE — TELEPHONE ENCOUNTER
RN called patient to assess pain and condition. Patient stating that her pain is controlled except for at night. She takes the oxycodone then. Patient also worried about drainage and incision. She is going to send message to [facility] with photos. She states the drainage wasn't too concerning but she wants to double check.     RN scheduled 2 week post op for Thursday    Monique Sherman RN

## 2024-07-23 ENCOUNTER — VIRTUAL VISIT (OUTPATIENT)
Dept: SURGERY | Facility: CLINIC | Age: 64
End: 2024-07-23
Payer: COMMERCIAL

## 2024-07-23 DIAGNOSIS — E66.01 OBESITY, CLASS III, BMI 40-49.9 (MORBID OBESITY) (H): Primary | ICD-10-CM

## 2024-07-23 DIAGNOSIS — Z71.3 NUTRITIONAL COUNSELING: ICD-10-CM

## 2024-07-23 PROCEDURE — 97803 MED NUTRITION INDIV SUBSEQ: CPT | Mod: 95

## 2024-07-23 NOTE — LETTER
7/23/2024      Nicci Pemberton  [address] [place] [address]      Dear Colleague,    Thank you for referring your patient, Nicci Pemberton, to the [facility] [place]. Please see a copy of my visit note below.    Nicci Pemberton is a 63 year old who is being evaluated via a billable video visit.      How would you like to obtain your AVS? MyChart  If the video visit is dropped, the invitation should be resent by: Text to cell phone: [phone]  Will anyone else be joining your video visit? No -  is in the room        Medical  Weight Loss Follow-Up Diet Evaluation  Assessment:  Nicci is presenting today for a follow up weight management nutrition consultation.  This patient has had an initial appointment and was referred by Dr. Andujar for MNT as treatment for Morbid obesity.  Weight loss medication:  none .   Pt's weight is 224 lbs  Initial weight: 229 lbs  Weight change: 9 lbs, 4 lbs gain (swelling?)         No data to display              BMI: There is no height or weight on file to calculate BMI.  Ideal body weight: 50.1 kg (110 lb 7.9 oz)  Adjusted ideal body weight: 71.6 kg (157 lb 14.3 oz)    Estimated RMR ([place]-St Jeor equation):   1550 kcals x 1.2 (sedentary) = 1860 kcals (for weight maintenance)  Recommended Protein Intake:  grams of protein/day  Patient Active Problem List:  Patient Active Problem List   Diagnosis     Heterozygous alpha 1-antitrypsin deficiency (H)     Iron overload     Status post liver transplantation (H)     C. difficile diarrhea     Steroid-induced hyperglycemia     Immunosuppressed status (H24)     History of liver recipient (H)     Vertigo     Otitis media     Bile duct stricture (H28)     Common bile duct stenosis (H28)     Abnormal liver function     Acquired lymphedema of leg     Chronic pain of both knees     Venous hypertension of lower extremity, bilateral     Cramp in lower extremity associated with sleep      Obesity with serious comorbidity     Edema     Hypertension, unspecified type     Gastroesophageal reflux disease without esophagitis     Hyponatremia     Hypothyroidism     Leg swelling     Lymphedema     Macrocytosis without anemia     Malaise     Fatty liver disease, nonalcoholic     Osteoarthritis of both knees, unspecified osteoarthritis type     Osteoarthritis of knee     Peripheral neuropathy     Post-menopausal bleeding     Slow transit constipation     Thrombocytopenia (H24)     Vitamin D deficiency     Zinc deficiency     Bilateral edema of lower extremity     Physical debility     Diverticulitis of colon     Suspected 2019 novel coronavirus infection     Morbid obesity (H)     S/P liver transplant (H)     Liver transplant rejection (H)     Chronic kidney disease, stage 3 (H)     Left knee pain     Status post total knee replacement     Incisional infection     Fracture of bone of left shoulder     Other hyperlipidemia     Septic arthritis (H)     Septic arthritis of knee, right (H)     Pain and swelling of right knee     Prosthetic joint infection (H24)       Progress on goals from last visit: Patient has emergency surgery this past week d/t infection in her knee. Priorities ficus on healing, hydration and adequate nutrition. Continues prioritizing yogurt in the AM for probiotics while she is on antibiotics.   - not able to exercise at this time as she is using a walker during the healing process  - has not started PT yet.          Dietary Recall:  NO: apples and fruits with core and pits   Breakfast: eggs with fresh fruit (berries, bananas) Or greek yogurt (low fat) with berries OR magic spoon cereal   Lunch: Or deli lunch meat with cream cheese Cottage cheese with salad   Dinner: fish (salmon, tilapia, shrimp), chicken, pork with salad  Beverages:   water (60-70oz)  Gatorade Zero     Exercise: walking outside     Nutrition Diagnosis:    Morbid obesity related to excessive energy intake as evidence by  patient subjective report and BMI of 40.97        Intervention:  Food and/or nutrient delivery: consistency with meals. Protein goal at each meal.   Nutrition education: discussed benefits from fib rich whole grains   Nutrition counseling: continued support and goal setting      Monitoring/Evaluation:    Goals:  Increase protein intake 90-120g per day (add protein supplement) for healing.  Increase walking habits once healing allows   Continue current hydration goals (~70oz/day)      Patient to follow up in <1 month(s) with bariatrician and 4.5 month(s) with RD          Video-Visit Details    Type of service:  Video Visit    Video Start Time (time video started): 11:00 AM    Video End Time (time video stopped): 11:15 AM    Originating Location (pt. Location): Home      Distant Location (provider location):  Off-site    Mode of Communication:  Video Conference via [facility]    Physician has received verbal consent for a Video Visit from the patient? Yes      Anamaria Gardner RD             Again, thank you for allowing me to participate in the care of your patient.        Sincerely,        Anamaria Gardner RD

## 2024-07-23 NOTE — PROGRESS NOTES
Nicci Pemberton is a 63 year old who is being evaluated via a billable video visit.      How would you like to obtain your AVS? MyChart  If the video visit is dropped, the invitation should be resent by: Text to cell phone: [phone]  Will anyone else be joining your video visit? No -  is in the room        Medical  Weight Loss Follow-Up Diet Evaluation  Assessment:  Nicci is presenting today for a follow up weight management nutrition consultation.  This patient has had an initial appointment and was referred by Dr. Andujar for MNT as treatment for Morbid obesity.  Weight loss medication:  none .   Pt's weight is 224 lbs  Initial weight: 229 lbs  Weight change: 9 lbs, 4 lbs gain (swelling?)         No data to display              BMI: There is no height or weight on file to calculate BMI.  Ideal body weight: 50.1 kg (110 lb 7.9 oz)  Adjusted ideal body weight: 71.6 kg (157 lb 14.3 oz)    Estimated RMR ([place]-St Jeor equation):   1550 kcals x 1.2 (sedentary) = 1860 kcals (for weight maintenance)  Recommended Protein Intake:  grams of protein/day  Patient Active Problem List:  Patient Active Problem List   Diagnosis    Heterozygous alpha 1-antitrypsin deficiency (H)    Iron overload    Status post liver transplantation (H)    C. difficile diarrhea    Steroid-induced hyperglycemia    Immunosuppressed status (H24)    History of liver recipient (H)    Vertigo    Otitis media    Bile duct stricture (H28)    Common bile duct stenosis (H28)    Abnormal liver function    Acquired lymphedema of leg    Chronic pain of both knees    Venous hypertension of lower extremity, bilateral    Cramp in lower extremity associated with sleep    Obesity with serious comorbidity    Edema    Hypertension, unspecified type    Gastroesophageal reflux disease without esophagitis    Hyponatremia    Hypothyroidism    Leg swelling    Lymphedema    Macrocytosis without anemia    Malaise    Fatty liver disease, nonalcoholic     Osteoarthritis of both knees, unspecified osteoarthritis type    Osteoarthritis of knee    Peripheral neuropathy    Post-menopausal bleeding    Slow transit constipation    Thrombocytopenia (H24)    Vitamin D deficiency    Zinc deficiency    Bilateral edema of lower extremity    Physical debility    Diverticulitis of colon    Suspected 2019 novel coronavirus infection    Morbid obesity (H)    S/P liver transplant (H)    Liver transplant rejection (H)    Chronic kidney disease, stage 3 (H)    Left knee pain    Status post total knee replacement    Incisional infection    Fracture of bone of left shoulder    Other hyperlipidemia    Septic arthritis (H)    Septic arthritis of knee, right (H)    Pain and swelling of right knee    Prosthetic joint infection (H24)       Progress on goals from last visit: Patient has emergency surgery this past week d/t infection in her knee. Priorities ficus on healing, hydration and adequate nutrition. Continues prioritizing yogurt in the AM for probiotics while she is on antibiotics.   - not able to exercise at this time as she is using a walker during the healing process  - has not started PT yet.          Dietary Recall:  NO: apples and fruits with core and pits   Breakfast: eggs with fresh fruit (berries, bananas) Or greek yogurt (low fat) with berries OR magic spoon cereal   Lunch: Or deli lunch meat with cream cheese Cottage cheese with salad   Dinner: fish (salmon, tilapia, shrimp), chicken, pork with salad  Beverages:   water (60-70oz)  Gatorade Zero     Exercise: walking outside     Nutrition Diagnosis:    Morbid obesity related to excessive energy intake as evidence by patient subjective report and BMI of 40.97        Intervention:  Food and/or nutrient delivery: consistency with meals. Protein goal at each meal.   Nutrition education: discussed benefits from fib rich whole grains   Nutrition counseling: continued support and goal  setting      Monitoring/Evaluation:    Goals:  Increase protein intake 90-120g per day (add protein supplement) for healing.  Increase walking habits once healing allows   Continue current hydration goals (~70oz/day)      Patient to follow up in <1 month(s) with bariatrician and 4.5 month(s) with RD          Video-Visit Details    Type of service:  Video Visit    Video Start Time (time video started): 11:00 AM    Video End Time (time video stopped): 11:15 AM    Originating Location (pt. Location): Home      Distant Location (provider location):  Off-site    Mode of Communication:  Video Conference via [facility]    Physician has received verbal consent for a Video Visit from the patient? Yes      Anamaria Gardner RD

## 2024-07-24 ENCOUNTER — TELEPHONE (OUTPATIENT)
Dept: TRANSPLANT | Facility: CLINIC | Age: 64
End: 2024-07-24
Payer: COMMERCIAL

## 2024-07-24 ENCOUNTER — TELEPHONE (OUTPATIENT)
Dept: INFECTIOUS DISEASES | Facility: CLINIC | Age: 64
End: 2024-07-24
Payer: COMMERCIAL

## 2024-07-24 NOTE — TELEPHONE ENCOUNTER
Contacted pt back.    Pt's AST 55 (29)  ALT 37 (15)    Pt is on rocephin through August 24th for Noted growth of Strep agalactiae (Group B Strep) from synovial fluid and intra-op cultures.     Pt has PT starting next week.    Message to dr Leventhal.

## 2024-07-24 NOTE — TELEPHONE ENCOUNTER
Glendy Lemus MD  You; [facility] Infectious [facility] Adult [facility] Rn50 minutes ago (9:43 AM)     GT  Reviewed labs - mildly elevated transaminases. Recommend to monitor for now and trend with next set of labs.     You  Glendy Lemus MD51 minutes ago (9:41 AM)     MP  Dr. Lemus, LFT;s evelvated.  Eder Jones RN  Infectious Disease on 7/24/2024 at 9:41 AM

## 2024-07-24 NOTE — TELEPHONE ENCOUNTER
Sent to provider to advise on elevated LFT's.       Eder Jones RN  Infectious Disease on 7/24/2024 at 9:40 AM

## 2024-07-24 NOTE — TELEPHONE ENCOUNTER
Pt called regarding some elevated liver labs that were taken when the patient had labs done for her knee infection. Pt would like to discuss the results with the RNCC today if possible.

## 2024-07-25 NOTE — TELEPHONE ENCOUNTER
Per dr Leventhal patient to have repeat labs in a week.    Spoke with Nicci. She has labs scheduled for Monday with home care.

## 2024-07-26 LAB — BACTERIA SNV CULT: NORMAL

## 2024-07-27 LAB — BACTERIA SNV CULT: NORMAL

## 2024-07-29 ENCOUNTER — VIRTUAL VISIT (OUTPATIENT)
Dept: SURGERY | Facility: CLINIC | Age: 64
End: 2024-07-29
Payer: COMMERCIAL

## 2024-07-29 VITALS — HEIGHT: 62 IN | BODY MASS INDEX: 40.12 KG/M2 | WEIGHT: 218 LBS

## 2024-07-29 DIAGNOSIS — K76.0 FATTY LIVER DISEASE, NONALCOHOLIC: ICD-10-CM

## 2024-07-29 DIAGNOSIS — E66.01 CLASS 2 SEVERE OBESITY DUE TO EXCESS CALORIES WITH SERIOUS COMORBIDITY AND BODY MASS INDEX (BMI) OF 39.0 TO 39.9 IN ADULT (H): Primary | ICD-10-CM

## 2024-07-29 DIAGNOSIS — E66.812 CLASS 2 SEVERE OBESITY DUE TO EXCESS CALORIES WITH SERIOUS COMORBIDITY AND BODY MASS INDEX (BMI) OF 39.0 TO 39.9 IN ADULT (H): Primary | ICD-10-CM

## 2024-07-29 DIAGNOSIS — Z94.4 STATUS POST LIVER TRANSPLANTATION (H): ICD-10-CM

## 2024-07-29 PROCEDURE — 99215 OFFICE O/P EST HI 40 MIN: CPT | Mod: 95 | Performed by: EMERGENCY MEDICINE

## 2024-07-29 ASSESSMENT — PAIN SCALES - GENERAL: PAINLEVEL: SEVERE PAIN (7)

## 2024-07-29 NOTE — PATIENT INSTRUCTIONS
Plan:   1.  Diet: during this lower activity/recuperation phase, we'll seek to remain weight neutral to very slight weight reduction and shooting for 1650-1750kcal/day with 80-100grams of lean protein daily should optimize healing.    2. Exercise: ramp up PT once cleared in a couple weeks.  3. Medication: no meds currently. We'll avoid injections given concerns for easy infection.  We could consider low dose phentermine, 4-8mg [place]a in the future if needing some assistance for situational appetite control. Some stimulation side effects are possible, but minimized with the lower dosing range compared to more traditional Phentermine dosing schedules.    4. Recheck with me in 3-4 months.  5. Goals: down over 4% body weight already, about group home to your goal for improving liver health. Long term, keeping weight under 205 lbs would be excellent improvement.            LEAN PROTEIN SOURCES  Getting 20-30 grams of protein, 3 meals daily, is appropriate for most people, some need more but more than about 40 grams per meal is not useful.  General rule is drinking one ounce of water per gram of protein eaten over the course of the day:  70 grams of protein each day, drink 70 oz of water.  Protein Source Portion Calories Grams of Protein                           Nonfat, plain Greek yogurt    (10 grams sugar or less) 3/4 cup (6 oz)  12-17   Light Yogurt (10 grams sugar or less) 3/4 cup (6 oz)  6-8   Protein Shake 1 shake 110-180 15-30   Skim/1% Milk or lactose-free milk 1 cup ( 8 oz)  8   Plain or light, flavored soymilk 1 cup  7-8   Plain or light, hemp milk 1 cup 110 6   Fat Free or 1% Cottage Cheese 1/2 cup 90 15   Part skim ricotta cheese 1/2 cup 100 14   Part skim or reduced fat cheese slices 1 ounce 65-80 8     Mozzarella String Cheese 1 80 8   Canned tuna, chicken, crab or salmon  (canned in water)  1/2 cup 100 15-20   White fish (broiled, grilled, baked) 3 ounces 100 21   [place]/Tuna  (broiled, grilled, baked) 3 ounces 150-180 21   Shrimp, Scallops, Lobster, Crab 3 ounces 100 21   Pork loin, Pork Tenderloin 3 ounces 150 21   Boneless, skinless chicken /turkey breast                          (broiled, grilled, baked) 3 ounces 120 21   [place], [place], [place], and Venison 3 ounces 120 21   Lean cuts of red meat and pork (sirloin,   round, tenderloin, flank, ground 93%-96%) 3 ounces 170 21   Lean or Extra Lean Ground Turkey 1/2 cup 150 20   90-95% Lean [place] Burger 1 russ 140-180 21   Low-fat casserole with lean meat 3/4 cup 200 17   Luncheon Meats                                                        (turkey, lean ham, roast beef, chicken) 3 ounces 100 21   Egg (boiled, poached, scrambled) 1 Egg 60 7   Egg Substitute 1/2 cup 70 10   Nuts (limit to 1 serving per day)  3 Tbsp. 150 7   Nut Corder (peanut, almond)  Limit to 1 serving or less daily 1 Tbsp. 90 4   Soy Burger (varies) 1  15   Garbanzo, Black, Wilder Beans 1/2 cup 110 7   Refried Beans 1/2 cup 100 7   Kidney and Lima beans 1/2 cup 110 7   Tempeh 3 oz 175 18   Vegan crumbles 1/2 cup 100 14   Tofu 1/2 cup 110 14   Chili (beans and extra lean beef or turkey) 1 cup 200 23   Lentil Stew/Soup 1 cup 150 12   Black Bean Soup 1 cup 175 12       Example Meal Plan for a 2423-3303 Calorie Diet:    In order to fuel your weight loss properly and avoid hunger-induced overeating later in the day, for your height and weight, you will enjoy the most success by following the diet below or similar with adjustments based on your particular tastes and preferences.  Exercise may influence speed, amount of weight loss further.     I recommend getting into a meal routine and keeping it similar day to day in the beginning so you don t have to think too hard about what you re going to make/eat.  Keep snacks healthy, ideally containing protein and some vegetables.  Non-processed food is preferable to packaged items.  Eat at least a few crunchy green vegetables  if having a snack, which should be 2-3 hours after your mealtimes(prepare these ahead of time for ease of use).  Drink 64 oz -80 oz of water daily for most, some of you will need more and we'll discuss it at your visit if that is the case.      When changing our diet,  we can often mistake thirst for hunger or just have some distracted eating habits that we need to break free from ('bored/mindless eating', screen time,work, driving,etc).  A glass of water and reconsideration of our hunger is often all that is needed.  Having the urge is not the problem, but watching it pass by without acting on it is the goal.    If you re having hunger problems, add a protein drink/snack to your morning hours or afternoon snack with at least 20grams of protein and not too much sugar (under 10g).  A carton of higher protein/low sugar yogurt can work as well.  If the urge to snack is overwhelming and not satiated, try going for a 10 minute walk/exercise, come home and drink a glass of water and if still hungry, have a  calorie snack (handful of raw/sprouted nuts, veggies and string cheese, protein bar, etc).  Savor it.    It is better to have a large breakfast, a moderate lunch and a smaller dinner to fuel your day.  People lose 10-15% more weight during their weight loss season with this strategy. Optimizing your protein intake at each meal will further keep you more satisfied while eating less food overall.  Getting exercise in early has also been shown to offer the best results (before breakfast ideally but anytime is the right time to exercise if that is not an option for you).    To make sure you re getting adequate vitamins and minerals during weight loss, I recommend one complete multivitamin a day of your choice.  Consider a probiotic and taking some vitamin D 2000 IU daily.    Let supper be your last meal of the day and ideally try to have at least 12 hours between supper and breakfast the next day to tap into some  beneficial overnight fasting dynamics.  Midnight snacks need to go away. Water in the evening is fine, unsweetened, non caffeinated herbal tea is helpful as well.  Consolidating your meals within a 8-12 hour period of your day will help tap into these additional metabolic benefits and tends to keep your appetite up for breakfast, further helping to stay on track.  For most of my patients, I don't recommend an intermittent fasting style diet (many find it hard to fit in their lifestyle) but an overnight fast is very doable for most patients and helps regulate our hunger drives a little better.  This makes it very important to nail good intake at all three meals to feel satisfied/energized and still lose weight.      If evening snacking desires are high, consider a glass of fiber supplement for some additional fullness (metamucil or similar). Most of us don't get the 25-30 grams per day of fiber that promotes good gut health/satiety.  Benefiber, metamucil, citrucel are reasonable/affordable options for most people.  Inulin, chicory, psyllium husk are reasonable options but start slow and low in the dose to avoid gas/bloating until your gut gets acclimated (ramping up to 5-10 grams per day of supplemental fiber after 3-4 weeks if needed).      Example Meal Plan:  Breakfast: 450-475 Calories  1 egg cooked on low in olive oil:   calories.  5oz Greek Yogurt (Fage plain classic: ~150 marcie)  Handful of Berries of your choice (about a calorie per berry or 20-40cal per handful)    cup(cooked) of  old fashioned oatmeal or 1/2 cup(cooked) steel cut oats. (150 marcie)  Sprinkle amount of brown sugar and a pat of butter. (40 marcie)  Glass of  Water  Black coffee or unsweetened Tea (0calories).      2-3 hours Later Snack: (195 calories).  Glass of water  One string Cheese (80 calories) or 4 oz creamed cottage cheese (115 calories) with  Crunchy Celery sticks (less than 10 calories per large stalk) 2 stalks. (20 calories)    of a   Large Banana or   of a Large Apple (60 calories):  eat second half at lunch or afternoon snack.     Lunch:300 -350 calories   Chicken Breast  (baked/broiled/roasted/grilled)  4-6 oz.  (125-180 marcie), BBQ sauce/hot sauce/mustard/seasoning is free. Just use a reasonable amount. Or a can of tuna with 1 tablespoon mayonnaise.  Salad: lettuce, any other veggies (cucumbers, green peppers/celery you like and a small drizzle of dressing to just flavor.  Go as big on the veggies as you like,  as they are practically calorie free.   A whole, 8 inch cucumber is 45 calories, a whole green pepper is 23 calories, a stalk of celery is 9 calories.  [place] Dressing is 60 calories per tablespoon..so moderate your desired dressing or do a drizzle of olive oil and splash of balsamic vinegar on top,  Total calories unlikely to be over 150 even with dressing.  Glass of Water.    Option for lunch is meal replacement protein drink/smoothie.  Need at least 20 grams of protein and eat the rest of your apple/banana from the morning snack.      Afternoon Snack: 150-200 calories   Cheese Stick or cottage cheese again  and a fresh fruit OR  Granola Bar (protein Bar acceptable if under 200 calories OR  Homemade smoothies:  8oz skim milk,  a handful of berries (fresh or frozen and a serving of protein powder such as BiPro or Isela sWhey for example.  If you don't like dairy, make with 8oz water, one small banana, handful of berries and the protein powder, add any veggies you want as well:  roughly 200 calories.   Glass of Water    Dinner: 325 calories  4oz of fresh, Atlantic salmon.  Broiled (salt/pepper/dill) for about 8-8.5 minutes (200calories) or  4oz filet mignon steak or sirloin steak  Salad or vegetable sautéed lightly in olive oil or   Broccoli 1.5  cups chopped and steamed  or micro-waved in a little water (75 calories)  Glass of Water,    Cup of herbal tea (unsweetened, caffeine free)      Herbs and seasonings are encouraged to  "flavor your foods/vegetables.  Make your food delicious.      Tips for Success:  1.  Prepare proteins ahead of time (broil chicken breasts in bulk so you can grab and go), steel cut oats/lentils can be stored in casserole dish/bowl in the fridge for quick scoop in the morning and rewarm in microwave, make use of crock pot recipes (watch salt content).  Making meals that cover 3-4 future meals is an easy way to stay on track.  2.  Drink a 8-12 oz glass of water every 2-3 hours when awake.  We often mistake hunger for thirst, especially when losing weight.  3. Remember your Reward and Motivation when things get hard.  4.  Weigh yourself every morning and record, you'll stay on track better and learn how our biorhythms, diet and elimination patterns show up on the scale. Don't worry about 1 or 2 day patterns, but when on track you'll notice good trend downward of weight over 3-4 day segments.  Plateaus tend to resolve after 4-8 days in most cases if you stay consistent with your plan.  These are natural and part of weight loss, even if you're perfect with your plan execution.  5. Call if problems/concerns.  [employer] is a great tool to stay in touch and provide weekly outside accountability. Check in with questions or if you want to brag.  6.  Find a handful of meals/foods that keep you on track and feeling good and get into a routine that is sustainable for you.  It's OK to have a routine that works for you.  7.  Consider taking a complete multivitamin just to make sure all micronutrients are adequate during weight loss.  8. If losing hair/brittle nails it usually means you are not taking enough protein.  Minimum goal is 60 grams daily of protein for smaller women, 80 grams a day for men. Consider taking Biotin as supplement or a \"Hair and Nail\" multivitamin.    [place]ci, Just about 100-150kcal/day more than described above would be the ballpark to shoot for right now as you're healing.          Information about the " "Weight Loss Medication Phentermine    When combined with mindful eating and behavior changes, weight loss medications can be a nice additional tool to maximize your weight loss season.  There are no magic pills and without diet and behavior changes, weight loss will be minimal.  Think of this medication as a tool to make your diet and behavior changes easier and you'll enjoy a higher probability of success.  Remember not to skip meals, but use this medication to tolerate your reduced calories more easily.  If you are very hungry in the evenings, you are likely not eating enough in the first 10 hours of your day and need to focus on getting your protein requirements in at each of your 3 daily meals.      Phentermine is a stimulant medication related to the amphetamine class of medication but with a lower risk of dependence and addiction.  It is used for weight loss by suppressing the appetite region of the brain.  It also may speed up the metabolic rate and give a person more energy.  Like any medication there are potential side effects and the most common are:  Dry mouth occurs in almost everyone (hydrate well), fewer people experience Palpatations, fast heart rate, elevation of blood pressure, restlessness, insomnia, dizziness, change in mood, tremor, headache, changes in bowel movements,itchiness, changes in sex drive.  If you are or may have become pregnant, do not use phentermine as it increases the risk for birth defects/miscarriage.  Do not use if breastfeeding.    Some people can develop serious side effects which include:  Heart strain (\"ischemia\").  Tachycardia (fast heart rate or irregular heart rate).  Hypertension  Pulmonary Hypertension  Psychosis  Dependency and abuse has occurred in some.  If you've been on high dose (37.5mg) for long periods, phentermine should be tapered down over a few weeks before abruptly stopping as seizures have been reported rarely.    We do not recommend taking it in " combination with the following medications due to potential drug interactions which can increase the risk of side effects and/or potential for seizures:    Absolutely contraindicated are:  Amphetamines or other stimulants like ADHD medication: (dextroamphetamine, amphetamine, diethylpropion, isocarboxazid, methamphetamine, lisdexamfetamine, benzphetamine,dexmethylphenidate, methylphenidate, selegiline patch, sibutramine, tranylcypromine.    Avoid use with:   Dopamine, dobutamine, ephedra, ephedrine, epinephrine, isoproterenol, linezolid, norepinephrine, phenylephrine injection, venlafaxine (Effexor).    Monitor or modify dose with:  Acebutolol, atenolol, betaxolol, bisoprolol, carvedilol, droxidopa, esmolol, labetalol, magnesium citrate, metoprolol, nadolol, nebivolol, penbutolol, pindolol, propranolol, sotalol, timolol.    Caution with: armodafinil,betaxolol eye drops, brexpiprazole, bupropion, busulfan, caffeine, carteolol drops, enzalutaminde, ginseng, green tea, guarana, levobunolol drops, lindane cream, modafinil, afrin nasal spray (oxymetazoline), pamabrom, phenylephrine oral and nasal spray, pseudoephedrine (sudafed), rasgiline, sleegiline, bowel prep, tiagabine, timolol drops,  TRAMADOL due to increased risk of seizures.    The current cheapest place to fill your prescription is at [facility] or [place] [facility] or [place] and is around $22for 90 tablets.  Occasionally, [employer] and [employer] have price matched, so call around and get the best price for you.  Other pharmacies may charge closer to$70- $100 for the same prescription. You don't have to be a member to use the pharmacy at [facility] currently.  An alternative some patients have tried is using a voucher system through [employer].  $25 paid on their website gets you a  voucher that can allows you to pick your meds up at [facility] without paying anything more.  [employer] may also offer discounted coupons and give prices around  "you.    Dosing:  We start with half a tablet for the first 2-3 weeks and if tolerating it without problems, you can take up to one full tablet daily in the morning after breakfast.  For those with evening hunger problems, sometimes half a tablet in the morning and half a tablet around 1 pm can be effective, however, risks of nighttime insomnia/restless increase with afternoon dosing so call me at the clinic if considering this regimen or having any issues.  You only have to use the amount effective for you, not to exceed one full tablet.  It can also be used situationaly and does not have to be taken every day. For more sensitive individuals,: get a pill cutter and cut the half tab into quarters and use a quarter of a tablet, about 9mg, for a couple weeks before increasing to half a tablet (18.75mg) if needed.  As always, if any questions give us a call at the [facility] telephone:  [phone].     Don't use Phentermine if sick/ill or using other stimulants/cold medications and it's OK to skip days that you don't feel the need for appetite suppressant assistance.  This medication works the day you take it and doesn't require \"building up\" in the system.    "

## 2024-07-29 NOTE — PROGRESS NOTES
Bariatric Clinic Follow-Up Visit:    Nicci Pemberton is a 64 year old  female with Body mass index is 39.87 kg/m .  presenting here today for follow-up on non-surgical efforts for weight loss. She'd previously worked with us briefly in 2017 but then went on to need liver transplantation.  In 2024, she'd followed up with her Gastroenterologist, Dr. Leventhal, and she was urged to drop at least 7-10% total body weight to improve overall liver health.  Re-establishment of Care Intake visit occurred on 2/29/24 with a weight of 229 lbs and BMI of 41.9.  She was maintaining a 21 lb reduction from her heaviest weight in 2017 of 250 lbs but has been working her way back up to that level of excess weight since dropping a lot of weight right after liver transplant (see chart above). Along with diet and behavior changes, she has been prescribed Wegovy initially to assist her weight loss goals, but she notes she did not want to start it upon follow up correspondence on 3/12/24. Upon follow up today, 7/29/24, she she's not interested in injectable therapies given her recent knee infection/immunosuppression risks. Not interested in medication at this time, still needing oxycodone so not a good naltrexone candidate. Given age, would avoid high dose phentermine but could consider low dose options in the future (Lomaira).  Hospitalization in Mid July 2024 about 2 weeks ago was related to joint infection and sent home w/ prolonged IV antibiotics (Ceftriaxone).  See her intake visit notes for details on identified contributors to weight gain in the past. Chart review shows Dietician calculated RMR of 1550kcal/day and protein intake goal of 60-80g/day. Upon follow up with Dietician 7/23/24 to promote better healing aiming for 90-120g/day protein.      7/15/24 labs notable for low albumin, low total protein, high CRP, mild macrocytic anemia, normal transaminases.  Weight:   Wt Readings from Last 5 Encounters:   07/29/24 98.9 kg (218 lb)    07/12/24 101.6 kg (224 lb)   02/29/24 103.9 kg (229 lb)   02/22/24 104.8 kg (231 lb)   06/05/23 100.7 kg (222 lb)    pounds  11 lbs down from introductory weight, a 4.8% total body weight reduction from restart. Down 33 lbs from peak 2023 weight of 250 lbs, a 13.2% body weight reduction.    Comorbidities:  Patient Active Problem List   Diagnosis    Heterozygous alpha 1-antitrypsin deficiency (H)    Iron overload    Status post liver transplantation (H)    C. difficile diarrhea    Steroid-induced hyperglycemia    Immunosuppressed status (H24)    History of liver recipient (H)    Vertigo    Otitis media    Bile duct stricture (H28)    Common bile duct stenosis (H28)    Abnormal liver function    Acquired lymphedema of leg    Chronic pain of both knees    Venous hypertension of lower extremity, bilateral    Cramp in lower extremity associated with sleep    Obesity with serious comorbidity    Edema    Hypertension, unspecified type    Gastroesophageal reflux disease without esophagitis    Hyponatremia    Hypothyroidism    Leg swelling    Lymphedema    Macrocytosis without anemia    Malaise    Fatty liver disease, nonalcoholic    Osteoarthritis of both knees, unspecified osteoarthritis type    Osteoarthritis of knee    Peripheral neuropathy    Post-menopausal bleeding    Slow transit constipation    Thrombocytopenia (H24)    Vitamin D deficiency    Zinc deficiency    Bilateral edema of lower extremity    Physical debility    Diverticulitis of colon    Suspected 2019 novel coronavirus infection    Morbid obesity (H)    S/P liver transplant (H)    Liver transplant rejection (H)    Chronic kidney disease, stage 3 (H)    Left knee pain    Status post total knee replacement    Incisional infection    Fracture of bone of left shoulder    Other hyperlipidemia    Septic arthritis (H)    Septic arthritis of knee, right (H)    Pain and swelling of right knee    Prosthetic joint infection (H24)       Current Outpatient  Medications:     acetaminophen (TYLENOL) 325 MG tablet, Take 2 tablets (650 mg) by mouth every 4 hours as needed for other, Disp: 60 tablet, Rfl: 0    aspirin (ASA) 81 MG chewable tablet, Take 2 tablets (162 mg) by mouth daily, Disp: 60 tablet, Rfl: 0    cefTRIAXone (ROCEPHIN) 2 GM vial, Inject 2 g into the vein every 24 hours for 41 days, Disp: , Rfl: 0    Cholecalciferol (VITAMIN D-3) 25 MCG (1000 UT) CAPS, Take 1,000 Units by mouth 2 times daily (Patient taking differently: Take 1,000 Units by mouth daily), Disp: 60 capsule, Rfl: 1    COMPRESSION STOCKINGS, 2 each every 12 hours, Disp: 2 Product, Rfl: 1    cycloSPORINE modified (GENERIC EQUIVALENT) 25 MG capsule, Take 3 capsules (75 mg) by mouth every morning AND 2 capsules (50 mg) every evening., Disp: 450 capsule, Rfl: 3    famotidine (PEPCID) 20 MG tablet, Take 1 tablet (20 mg) by mouth 2 times daily, Disp: 60 tablet, Rfl: 3    gabapentin (NEURONTIN) 100 MG capsule, Take 2 capsules (200 mg) by mouth 3 times daily, Disp: 30 capsule, Rfl: 0    levothyroxine (SYNTHROID/LEVOTHROID) 112 MCG tablet, Take 112.5 mcg by mouth every morning, Disp: , Rfl:     melatonin 5 MG tablet, Take 1 tablet (5 mg) by mouth nightly as needed for sleep, Disp: 30 tablet, Rfl: 0    methocarbamol (ROBAXIN) 500 MG tablet, Take 1 tablet (500 mg) by mouth 4 times daily, Disp: 20 tablet, Rfl: 0    oxyCODONE (ROXICODONE) 5 MG tablet, Take 1-2 tablets (5-10 mg) by mouth every 3 hours as needed for severe pain (IF pain not managed with non-pharmacological and non-opioid interventions), Disp: 30 tablet, Rfl: 0    polyethylene glycol (MIRALAX) 17 GM/Dose powder, Take 17 g by mouth daily, Disp: 510 g, Rfl: 0    rosuvastatin (CRESTOR) 5 MG tablet, Take 1 tablet (5 mg) by mouth daily, Disp: 90 tablet, Rfl: 0    senna-docusate (SENOKOT-S/PERICOLACE) 8.6-50 MG tablet, Take 2 tablets by mouth 2 times daily as needed for constipation, Disp: 60 tablet, Rfl: 0      Interim: Since our last visit, she has  "\"been responding to the antibiotics\". Joint issues started with severe pain following some fever/stomach flu symptoms and led to emergent joint surgery. Has been having protein drink since her last dietician visit given her more catabolic state.   Exercise: walker around the house. Some chair exercise. Sutures coming out later this week. In 2 weeks will start her PT regimen. 4 weekly sessions scheduled.     Feels she's doing well on her own without appetite suppression. Her recent infection \"scared me a lot\". Would like to avoid injections due to her recent infection.  Weaning down slowly on her oxycodone, still needs it at night. Getting 2 daily currently: midmorning and before bed.     Feels motivated to get stronger after the illness, appreciating how \"fragile my system is\".     Plan:   1.  Diet: during this lower activity/recuperation phase, we'll seek to remain weight neutral to very slight weight reduction and shooting for 1650-1750kcal/day with 80-100grams of lean protein daily should optimize healing.    2. Exercise: ramp up PT once cleared in a couple weeks.  3. Medication: no meds currently. We'll avoid injections given concerns for easy infection.  We could consider low dose phentermine, 4-8mg Lomaira in the future if needing some assistance for situational appetite control. Some stimulation side effects are possible, but minimized with the lower dosing range compared to more traditional Phentermine dosing schedules.    4. Recheck with me in 3-4 months.  5. Goals: down over 4% body weight already, about [place] to your goal for improving liver health. Long term, keeping weight under 205 lbs would be excellent improvement.      We discussed [facility] Bariatric Basics including:  -eating 3 meals daily  -reviewed metabolic needs for weight loss based on Resting Metabolic Rate  -protein goals supportive of healthy weight loss  -avoiding/limiting calorie containing beverages  -We discussed the importance of " "restorative sleep and stress management in maintaining a healthy weight.  -We discussed the National Weight Control Registry healthy weight maintenance strategies and ways to optimize metabolism.  -We discussed the importance of physical activity including cardiovascular and strength training in maintaining a healthier weight and explored viable options.      Most recent labs:  Lab Results   Component Value Date    WBC 8.0 07/15/2024    HGB 11.1 (L) 07/15/2024    HCT 34.5 (L) 07/15/2024     (H) 07/15/2024     (L) 07/15/2024     Lab Results   Component Value Date    CHOL 147 05/29/2024     Lab Results   Component Value Date    HDL 51 05/29/2024     No components found for: \"LDLCALC\"  Lab Results   Component Value Date    TRIG 65 05/29/2024     No results found for: \"CHOLHDL\"  Lab Results   Component Value Date    ALT 15 07/15/2024    AST 29 07/15/2024    ALKPHOS 123 07/15/2024     No results found for: \"HGBA1C\"  Lab Results   Component Value Date    B12 897 03/12/2024     No components found for: \"VITDT1\"  Lab Results   Component Value Date    DREW 553 (H) 12/06/2020     Lab Results   Component Value Date    PTHI 14 (L) 06/18/2018     Lab Results   Component Value Date    ZN 60.5 03/12/2024     No results found for: \"VIB1WB\"  Lab Results   Component Value Date    TSH 0.56 04/24/2024     No results found for: \"TEST\"    DIETARY HISTORY  Workingon protein intake,       PHYSICAL ACTIVITY PATTERNS:  Cardiovascular: limited to gentle, walker assisted movement in her home. PT in 2 weeks.  Strength Training: limited to ADLs    REVIEW OF SYSTEMS  Feeling better, see above..  PHYSICAL EXAM:  Vitals: Ht 1.575 m (5' 2\")   Wt 98.9 kg (218 lb)   BMI 39.87 kg/m    Weight:   Wt Readings from Last 3 Encounters:   07/29/24 98.9 kg (218 lb)   07/12/24 101.6 kg (224 lb)   02/29/24 103.9 kg (229 lb)         GEN: Pleasant, well groomed, in no acute distress  HEENT:  normal facies. .  NECK: No swelling.  HEART: .  LUNGS: No " respiratory difficulty noted. No cough. .  ABDOMEN: .  EXTREMITIES: No tremor. Ambulation is ..  NEURO: Alert and Oriented X3, fluent speech. .  SKIN: No visible rashes. .    Interim study results: ECHO in 2019 without valvular problems. .      40 minutes spent by me on the date of the encounter doing chart review, history and exam, documentation and further activities per the note   Darron Andujar MD  [facility]  7:42 AM  7/29/2024

## 2024-07-29 NOTE — NURSING NOTE
[facility] [address] [address] [facility] [address]    Is the patient currently in the state of [place]? YES    Visit mode:VIDEO    If the visit is dropped, the patient can be reconnected by: VIDEO VISIT: Text to cell phone:   Telephone Information:   Mobile [phone]       Will anyone else be joining the visit? NO  (If patient encounters technical issues they should call [phone]    How would you like to obtain your AVS? MyChart    Are changes needed to the allergy or medication list? No    Are refills needed on medications prescribed by this physician? NO    Reason for visit: RECHECK Bobbilynn Grossaint VVF

## 2024-07-29 NOTE — LETTER
7/29/2024      Nicci Pemberton  [address] [place] [address]      Dear Colleague,    Thank you for referring your patient, Nicci Pemberton, to the [facility] [place]. Please see a copy of my visit note below.    Bariatric Clinic Follow-Up Visit:    Nicci Pemberton is a 64 year old  female with Body mass index is 39.87 kg/m .  presenting here today for follow-up on non-surgical efforts for weight loss. She'd previously worked with us briefly in 2017 but then went on to need liver transplantation.  In 2024, she'd followed up with her Gastroenterologist, Dr. Leventhal, and she was urged to drop at least 7-10% total body weight to improve overall liver health.  Re-establishment of Care Intake visit occurred on 2/29/24 with a weight of 229 lbs and BMI of 41.9.  She was maintaining a 21 lb reduction from her heaviest weight in 2017 of 250 lbs but has been working her way back up to that level of excess weight since dropping a lot of weight right after liver transplant (see chart above). Along with diet and behavior changes, she has been prescribed Wegovy initially to assist her weight loss goals, but she notes she did not want to start it upon follow up correspondence on 3/12/24. Upon follow up today, 7/29/24, she she's not interested in injectable therapies given her recent knee infection/immunosuppression risks. Not interested in medication at this time, still needing oxycodone so not a good naltrexone candidate. Given age, would avoid high dose phentermine but could consider low dose options in the future (Lomaira).  Hospitalization in Mid July 2024 about 2 weeks ago was related to joint infection and sent home w/ prolonged IV antibiotics (Ceftriaxone).  See her intake visit notes for details on identified contributors to weight gain in the past. Chart review shows Dietician calculated RMR of 1550kcal/day and protein intake goal of 60-80g/day. Upon follow up with  Dietician 7/23/24 to promote better healing aiming for 90-120g/day protein.      7/15/24 labs notable for low albumin, low total protein, high CRP, mild macrocytic anemia, normal transaminases.  Weight:   Wt Readings from Last 5 Encounters:   07/29/24 98.9 kg (218 lb)   07/12/24 101.6 kg (224 lb)   02/29/24 103.9 kg (229 lb)   02/22/24 104.8 kg (231 lb)   06/05/23 100.7 kg (222 lb)    pounds  11 lbs down from introductory weight, a 4.8% total body weight reduction from restart. Down 33 lbs from peak 2023 weight of 250 lbs, a 13.2% body weight reduction.    Comorbidities:  Patient Active Problem List   Diagnosis     Heterozygous alpha 1-antitrypsin deficiency (H)     Iron overload     Status post liver transplantation (H)     C. difficile diarrhea     Steroid-induced hyperglycemia     Immunosuppressed status (H24)     History of liver recipient (H)     Vertigo     Otitis media     Bile duct stricture (H28)     Common bile duct stenosis (H28)     Abnormal liver function     Acquired lymphedema of leg     Chronic pain of both knees     Venous hypertension of lower extremity, bilateral     Cramp in lower extremity associated with sleep     Obesity with serious comorbidity     Edema     Hypertension, unspecified type     Gastroesophageal reflux disease without esophagitis     Hyponatremia     Hypothyroidism     Leg swelling     Lymphedema     Macrocytosis without anemia     Malaise     Fatty liver disease, nonalcoholic     Osteoarthritis of both knees, unspecified osteoarthritis type     Osteoarthritis of knee     Peripheral neuropathy     Post-menopausal bleeding     Slow transit constipation     Thrombocytopenia (H24)     Vitamin D deficiency     Zinc deficiency     Bilateral edema of lower extremity     Physical debility     Diverticulitis of colon     Suspected 2019 novel coronavirus infection     Morbid obesity (H)     S/P liver transplant (H)     Liver transplant rejection (H)     Chronic kidney disease, stage 3  (H)     Left knee pain     Status post total knee replacement     Incisional infection     Fracture of bone of left shoulder     Other hyperlipidemia     Septic arthritis (H)     Septic arthritis of knee, right (H)     Pain and swelling of right knee     Prosthetic joint infection (H24)       Current Outpatient Medications:      acetaminophen (TYLENOL) 325 MG tablet, Take 2 tablets (650 mg) by mouth every 4 hours as needed for other, Disp: 60 tablet, Rfl: 0     aspirin (ASA) 81 MG chewable tablet, Take 2 tablets (162 mg) by mouth daily, Disp: 60 tablet, Rfl: 0     cefTRIAXone (ROCEPHIN) 2 GM vial, Inject 2 g into the vein every 24 hours for 41 days, Disp: , Rfl: 0     Cholecalciferol (VITAMIN D-3) 25 MCG (1000 UT) CAPS, Take 1,000 Units by mouth 2 times daily (Patient taking differently: Take 1,000 Units by mouth daily), Disp: 60 capsule, Rfl: 1     COMPRESSION STOCKINGS, 2 each every 12 hours, Disp: 2 Product, Rfl: 1     cycloSPORINE modified (GENERIC EQUIVALENT) 25 MG capsule, Take 3 capsules (75 mg) by mouth every morning AND 2 capsules (50 mg) every evening., Disp: 450 capsule, Rfl: 3     famotidine (PEPCID) 20 MG tablet, Take 1 tablet (20 mg) by mouth 2 times daily, Disp: 60 tablet, Rfl: 3     gabapentin (NEURONTIN) 100 MG capsule, Take 2 capsules (200 mg) by mouth 3 times daily, Disp: 30 capsule, Rfl: 0     levothyroxine (SYNTHROID/LEVOTHROID) 112 MCG tablet, Take 112.5 mcg by mouth every morning, Disp: , Rfl:      melatonin 5 MG tablet, Take 1 tablet (5 mg) by mouth nightly as needed for sleep, Disp: 30 tablet, Rfl: 0     methocarbamol (ROBAXIN) 500 MG tablet, Take 1 tablet (500 mg) by mouth 4 times daily, Disp: 20 tablet, Rfl: 0     oxyCODONE (ROXICODONE) 5 MG tablet, Take 1-2 tablets (5-10 mg) by mouth every 3 hours as needed for severe pain (IF pain not managed with non-pharmacological and non-opioid interventions), Disp: 30 tablet, Rfl: 0     polyethylene glycol (MIRALAX) 17 GM/Dose powder, Take 17 g by  "mouth daily, Disp: 510 g, Rfl: 0     rosuvastatin (CRESTOR) 5 MG tablet, Take 1 tablet (5 mg) by mouth daily, Disp: 90 tablet, Rfl: 0     senna-docusate (SENOKOT-S/PERICOLACE) 8.6-50 MG tablet, Take 2 tablets by mouth 2 times daily as needed for constipation, Disp: 60 tablet, Rfl: 0      Interim: Since our last visit, she has \"been responding to the antibiotics\". Joint issues started with severe pain following some fever/stomach flu symptoms and led to emergent joint surgery. Has been having protein drink since her last dietician visit given her more catabolic state.   Exercise: walker around the house. Some chair exercise. Sutures coming out later this week. In 2 weeks will start her PT regimen. 4 weekly sessions scheduled.     Feels she's doing well on her own without appetite suppression. Her recent infection \"scared me a lot\". Would like to avoid injections due to her recent infection.  Weaning down slowly on her oxycodone, still needs it at night. Getting 2 daily currently: midmorning and before bed.     Feels motivated to get stronger after the illness, appreciating how \"fragile my system is\".     Plan:   1.  Diet: during this lower activity/recuperation phase, we'll seek to remain weight neutral to very slight weight reduction and shooting for 1650-1750kcal/day with 80-100grams of lean protein daily should optimize healing.    2. Exercise: ramp up PT once cleared in a couple weeks.  3. Medication: no meds currently. We'll avoid injections given concerns for easy infection.  We could consider low dose phentermine, 4-8mg Lomaira in the future if needing some assistance for situational appetite control. Some stimulation side effects are possible, but minimized with the lower dosing range compared to more traditional Phentermine dosing schedules.    4. Recheck with me in 3-4 months.  5. Goals: down over 4% body weight already, about jail to your goal for improving liver health. Long term, keeping weight under " "205 lbs would be excellent improvement.      We discussed [facility] Bariatric Basics including:  -eating 3 meals daily  -reviewed metabolic needs for weight loss based on Resting Metabolic Rate  -protein goals supportive of healthy weight loss  -avoiding/limiting calorie containing beverages  -We discussed the importance of restorative sleep and stress management in maintaining a healthy weight.  -We discussed the National Weight Control Registry healthy weight maintenance strategies and ways to optimize metabolism.  -We discussed the importance of physical activity including cardiovascular and strength training in maintaining a healthier weight and explored viable options.      Most recent labs:  Lab Results   Component Value Date    WBC 8.0 07/15/2024    HGB 11.1 (L) 07/15/2024    HCT 34.5 (L) 07/15/2024     (H) 07/15/2024     (L) 07/15/2024     Lab Results   Component Value Date    CHOL 147 05/29/2024     Lab Results   Component Value Date    HDL 51 05/29/2024     No components found for: \"LDLCALC\"  Lab Results   Component Value Date    TRIG 65 05/29/2024     No results found for: \"CHOLHDL\"  Lab Results   Component Value Date    ALT 15 07/15/2024    AST 29 07/15/2024    ALKPHOS 123 07/15/2024     No results found for: \"HGBA1C\"  Lab Results   Component Value Date    B12 897 03/12/2024     No components found for: \"VITDT1\"  Lab Results   Component Value Date    DREW 553 (H) 12/06/2020     Lab Results   Component Value Date    PTHI 14 (L) 06/18/2018     Lab Results   Component Value Date    ZN 60.5 03/12/2024     No results found for: \"VIB1WB\"  Lab Results   Component Value Date    TSH 0.56 04/24/2024     No results found for: \"TEST\"    DIETARY HISTORY  Workingon protein intake,       PHYSICAL ACTIVITY PATTERNS:  Cardiovascular: limited to gentle, walker assisted movement in her home. PT in 2 weeks.  Strength Training: limited to ADLs    REVIEW OF SYSTEMS  Feeling better, see above..  PHYSICAL " "EXAM:  Vitals: Ht 1.575 m (5' 2\")   Wt 98.9 kg (218 lb)   BMI 39.87 kg/m    Weight:   Wt Readings from Last 3 Encounters:   07/29/24 98.9 kg (218 lb)   07/12/24 101.6 kg (224 lb)   02/29/24 103.9 kg (229 lb)         GEN: Pleasant, well groomed, in no acute distress  HEENT:  normal facies. .  NECK: No swelling.  HEART: .  LUNGS: No respiratory difficulty noted. No cough. .  ABDOMEN: .  EXTREMITIES: No tremor. Ambulation is ..  NEURO: Alert and Oriented X3, fluent speech. .  SKIN: No visible rashes. .    Interim study results: ECHO in 2019 without valvular problems. .      40 minutes spent by me on the date of the encounter doing chart review, history and exam, documentation and further activities per the note   Darron Andujar MD  [facility]  7:42 AM  7/29/2024    Virtual Visit Details    Type of service:  Video Visit     Originating Location (pt. Location): Home    Distant Location (provider location):  On-site  Platform used for Video Visit: Giorgi      Again, thank you for allowing me to participate in the care of your patient.        Sincerely,        Darron Andujar MD  "

## 2024-07-30 ENCOUNTER — VIRTUAL VISIT (OUTPATIENT)
Dept: INFECTIOUS DISEASES | Facility: CLINIC | Age: 64
End: 2024-07-30
Attending: INTERNAL MEDICINE
Payer: COMMERCIAL

## 2024-07-30 ENCOUNTER — TELEPHONE (OUTPATIENT)
Dept: INFECTIOUS DISEASES | Facility: CLINIC | Age: 64
End: 2024-07-30
Payer: COMMERCIAL

## 2024-07-30 VITALS — BODY MASS INDEX: 40.12 KG/M2 | WEIGHT: 218 LBS | HEIGHT: 62 IN

## 2024-07-30 DIAGNOSIS — M00.261 STREPTOCOCCAL ARTHRITIS OF RIGHT KNEE (H): Primary | ICD-10-CM

## 2024-07-30 DIAGNOSIS — R79.89 ELEVATED SERUM CREATININE: ICD-10-CM

## 2024-07-30 DIAGNOSIS — T84.50XD INFECTION OF PROSTHETIC JOINT, SUBSEQUENT ENCOUNTER: ICD-10-CM

## 2024-07-30 DIAGNOSIS — Z94.4 STATUS POST LIVER TRANSPLANTATION (H): Chronic | ICD-10-CM

## 2024-07-30 PROCEDURE — 99417 PROLNG OP E/M EACH 15 MIN: CPT | Mod: 24 | Performed by: INTERNAL MEDICINE

## 2024-07-30 PROCEDURE — G2211 COMPLEX E/M VISIT ADD ON: HCPCS | Performed by: INTERNAL MEDICINE

## 2024-07-30 PROCEDURE — 99215 OFFICE O/P EST HI 40 MIN: CPT | Mod: 24 | Performed by: INTERNAL MEDICINE

## 2024-07-30 ASSESSMENT — PAIN SCALES - GENERAL: PAINLEVEL: MODERATE PAIN (4)

## 2024-07-30 NOTE — PROGRESS NOTES
Virtual Visit Details    Type of service:  Video Visit   Time Started: 2:01 PM  Time Ended: 2:45 PM    Originating Location (pt. Location): Home    Distant Location (provider location):  Off-site  Platform used for Video Visit: [facility] [address] [address]  Phone: [phone]  Fax: [phone]    Patient:  Nicci Pemberton, Date of birth 1960  Date of Visit:  07/30/2024  Referring Provider Andressa Harper MD  Reason for visit: R knee PJI    Recommendations   R knee PJI  Continue ceftriaxone 2 g/day until she sees me in clinic on 8/27/24  Weekly CBC and BMP to monitor for toxicity.   Repeat ESR the week of 8/19/24  Her creatinine was 1.19 today, which is within her baseline range (0.9-1.2). Will continue to monitor weekly.   She will see me in-person in ID clinic on 8/27/24 so that I can see her joint in person and ensure that she can safely transition to PO antibiotics. We will plan to pull her PICC at that time.   After completing her ceftriaxone, we will plan to transition to PO amoxicillin 500 mg TID with plans to complete 3-6 months of antibiotics  Antibiotic Allergies  Have reviewed and updated antibiotic allergies.    Have removed vancomycin as a drug allergy, as her story is consistent with IV infiltrate with subsequent inflammation.     Follow-up with me on 8/27/24 in person in my clinic. We will plan to transition to PO antibiotics are remove PICC line at that time.     I spent 150 minutes on the date of the visit reviewing the patient's chart, interpreting laboratory and imaging studies, talking with the patient, in documentation, and coordinating care.     The longitudinal plan of care for the prosthetic joint infection as documented were addressed during this visit. Due to the added complexity in care, I will continue to support Nicci in the subsequent management and with ongoing continuity of  care.    Andressa Harper MD  Transplant Infectious Diseases Attending  [phone]      Assessment   Transplants:  8/18/2019 (Liver); POD  1808.  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 Developed R knee pain about ~2 days prior to presentation. Unclear the exact source of infection. At this point, we are using the debridement, antibiotics, implant retention (DAIR) strategy for management with attempt of infection eradication. She did have short duration of symptoms  (~3 days) prior to debridement and the fact that this is a Strep is a favorable prognosis for eradication.  However, her prosthesis was placed in 2020, which places her at higher risk for treatment failure under the DAIR strategy. Will plan for 6 weeks IV antibiotics from date of exchange (7/13/24-8/27/24) with plans to switch to PO amoxicillin to complete at least 3 months total antibiotics.         Antibiotic Allergies  Reviewed these with her today. She noted the following:   Ciprofloxacin, cefpodoxime, and linezolid: Had significant vertigo when she was taking a combination of these 3 antibiotics for her L knee incision infection back in 11/2021. Vertigo stopped after discontinuing all antibiotics. Of these antibiotics, ciprofloxacin is most associated with vertigo. Beta lactams are not particularly associated with vertigo and she has tolerated ceftriaxone. Linezolid also not associated with vertigo. She is willing to try these in the future if needed.     Trimethoprim/sulfamethoxazole: Had joint swelling in wrists with TMP/SMX, but no hives.   Vancomycin: Experienced what looks like an infiltrated IV at the time of vancomycin infusion on 7/13/24 (see photo from the media tab on 7/13 below). Resolved with removal of  "IV. Not a contraindication to receiving this in the future. I have removed this from the allergy tab.        Transplant Checklist  - QTc interval:  - Pneumocystis prophylaxis: None  - Bacterial prophylaxis: None  - Fungal prophylaxis: None  - Serostatus & viral prophylaxis: CMV D-/R+, HSV ?, EBV D+/R+ None  - Immunization status: Up-to-date on PCV-20 (2022), RSV (12/2023). Will need Fall COVID-19 booster when this is available in September.   - Gamma globulin status:   Recent Labs   Lab Test 06/18/18  1024   IGG 1,960*     - Antibiotic allergies: See above     Prior infectious diseases issues   L TKA would infection (10/2021): Underwent I&D (11/10/21). Wound cultures grew E faealis (S amp and vanc), Citrobacter koseri, MRSE, Strep oralis, Anaerococcus, Peptoniphilus asaccharolytic. Treated by orthopedics per documentation by Orthopedic consultation 7/12/2024: \"Keflex x 2 days switched to: Linezolid 600 mg p.o. twice daily x10 days, Vantin 200 mg p.o. twice daily x10 days. Then Vantin 200mg PO BID every day x 10days, linezolid 600mg PO BID daily x 10 days.\"   SARS-CoV-2: Has 2 documented infections. One in 12/2020 and one in 5/2021.       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 She was recently admitted from 7/12-7/16 with R knee pain. Found to have  Strep agalactiae prosthetic joint infection. We are seeing her today in follow-up from her hospital stay.     Her recent R knee PJI history is as follows:   ~7/10/24: Develops R knee pain. Had shaking chills night before knee pain developed with a low-grade temperature of 100.4. Had vomiting and some diarrhea.   7/12/24: R knee arthrocentesis with 60 mL cloudy synovial fluid. WBC " "91,760 (96% PMNs). Growth of Strep agalactiae (Group B strep)  7/13/24: Underwent I&D with polyethylene liner exchange. Per op note, \"grossly evident purulent appearing fluid within knee joint\". Vancomycin powder was sprinkled in the joint. Intraoperative culture also grew Strep agalactiae.     Since discharge from the hospital, she has been doing well. Is due to get her sutures removed tomorrow at [facility]. Starting PT next Friday.     Prior Antibiotics  Ceftriaxone: 7/12-current  Vancomycin: 7/12-7/15      Vaccines     Immunization History   Administered Date(s) Administered    COVID-19 12+ (2023-24) (Pfizer) 11/14/2023    COVID-19 Bivalent 18+ (Moderna) 11/07/2022    COVID-19 Monovalent 18+ (Moderna) 03/18/2021, 04/15/2021, 02/15/2022    COVID-19 Monovalent Booster 18+ (Moderna) 07/12/2022    Flu 65+ Years 10/13/2020    Hepatitis B, Adult 09/21/2018, 10/22/2018, 01/17/2019, 04/09/2019, 06/05/2019    Influenza (High Dose) 3 valent vaccine 10/21/2019    Influenza (IIV3) PF 10/19/2011    Influenza Vaccine 18-64 (Flublok) 02/01/2022, 10/06/2022    Influenza Vaccine >6 months,quad, PF 11/07/2023    Influenza Vaccine, 6+MO IM (QUADRIVALENT W/PRESERVATIVES) 09/21/2018    Pneumococcal 20 valent Conjugate (Prevnar 20) 08/16/2022    Pneumococcal 23 valent 02/01/2005, 10/19/2011, 08/27/2020    RSV Vaccine (Abrysvo) 12/12/2023    RSV Vaccine (Arexvy) 11/01/2023, 12/12/2023    TD,PF 7+ (Tenivac) 10/02/1997    TDAP Vaccine (Boostrix) 06/23/2008, 09/21/2018    Zoster recombinant adjuvanted (SHINGRIX) 08/27/2020, 01/21/2021    Zoster vaccine, live 06/12/2016         Physical Exam   No vital signs taken, as this was a virtual visit   Gen: Alert, oriented. Talking into the camera.   HEENT: NC/AT. No scleral icterus noted. Neck supple and moving in all directions.   CV: Appears well-perfused. No cyanosis noted per video visit.   Resp: Breathing comfortably on room air. No increased work of breathing noted.   Skin: No rashes/lesions " noted on face or arms in the camera    Data     Data   Laboratory data and imaging listed below was reviewed prior to this encounter.     Microbiology:    Culture   Date Value Ref Range Status   07/13/2024 No anaerobic organisms isolated  Final   07/13/2024 No growth after 16 days  Preliminary   07/13/2024 (A)  Final    1+ Streptococcus agalactiae (Group B Streptococcus)     Comment:     Susceptibilities done on previous cultures   07/13/2024 No anaerobic organisms isolated  Final   07/13/2024 No growth after 16 days  Preliminary   07/13/2024 (A)  Final    1+ Streptococcus agalactiae (Group B Streptococcus)     Comment:     Susceptibilities done on previous cultures   07/13/2024 No anaerobic organisms isolated  Final   07/13/2024 No growth after 16 days  Preliminary   07/13/2024 (A)  Final    1+ Streptococcus agalactiae (Group B Streptococcus)     Comment:     Susceptibilities done on previous cultures   07/12/2024 No Growth  Final   07/12/2024 No Growth  Final   07/12/2024 (A)  Final    2+ Streptococcus agalactiae (Group B Streptococcus)   07/12/2024 No anaerobic organisms isolated  Final   11/10/2021 4+ Anaerococcus species (A)  Final     Comment:     Susceptibilities done on previous cultures   11/10/2021 2+ Peptoniphilus asaccharolyticus (A)  Final   11/10/2021 No Growth  Final   11/10/2021 No Growth  Final   11/10/2021 4+ Citrobacter koseri (A)  Final     Comment:     Susceptibilities done on previous cultures   11/10/2021 4+ Enterococcus faecalis (A)  Final     Comment:     Susceptibilities done on previous cultures   11/10/2021 4+ Streptococcus oralis (A)  Final     Comment:     Susceptibilities done on previous cultures   11/10/2021 4+ Citrobacter koseri (A)  Final     Comment:     Susceptibilities done on previous cultures   11/10/2021 Isolated in broth only Enterococcus faecalis (A)  Final     Comment:     On day 1 of incubation  Susceptibilities done on previous cultures   11/10/2021 4+ Anaerococcus  species (A)  Final   11/10/2021 No Growth  Final   11/10/2021 4+ Citrobacter koseri (A)  Final   11/10/2021 4+ Enterococcus faecalis (A)  Final   11/10/2021 4+ Staphylococcus epidermidis (A)  Final   11/10/2021 3+ Streptococcus oralis (A)  Final   10/13/2020 No Growth  Final   08/05/2019 No Growth  Final   08/02/2019   Final    No Salmonella, Shigella, Yersinia, or Campylobacter.   07/31/2019 No Growth  Final   04/14/2019 Mixture of urogenital organisms  Final   05/04/2018 CITROBACTER KOSERI (A)  Final     Comment:     >100,000 col/ml Citrobacter koseri     GS Culture   Date Value Ref Range Status   07/13/2024 See corresponding culture for results  Final   07/13/2024 See corresponding culture for results  Final   07/13/2024 See corresponding culture for results  Final   , Inflammatory Markers:   Recent Labs   Lab Test 07/12/24  1003 12/10/20  0844 12/09/20  0637 12/08/20  0511 12/07/20  0559 12/06/20  0504 10/30/20  0649 10/28/20  1603 06/18/18  1024   SED 83*  --   --   --   --   --   --  100* 17   CRP  --  51.6* 87.0* 165.0* 210.0* 119.0* 54.7* 108.0* 8.9*   , Hematology Studies:    Recent Labs   Lab Test 07/15/24  0648 07/14/24  0645 07/13/24  0620 07/12/24  1003 05/29/24  1030 02/20/24  1028 11/28/23  1613 04/06/21  1018 03/29/21  1040 03/27/21  0934 03/25/21  1325 01/07/21  1146 12/29/20  1108 12/11/20  0523 12/10/20  0844 12/09/20  0637   WBC 8.0  --  9.7 14.7* 5.2 4.5 6.0   < > 18.4* 4.7 5.4   < > 4.6   < > 5.7 5.9   ANEU  --   --   --   --   --   --   --   --  16.0* 3.1 3.2  --  3.0  --  4.1 3.9   AEOS  --   --   --   --   --   --   --   --  0.0 0.2 0.2  --  0.2  --  0.1 0.2   HGB 11.1* 11.6* 11.1* 12.1 13.3 13.2 13.1   < > 12.3 12.2 13.2   < > 12.0   < > 11.4* 10.6*   *  --  102* 99 100 97 101*   < > 98 97 97   < > 99   < > 99 99   *  --  89* 99* 153 148* 156   < > 155 127* 142*   < > 147*   < > 117* 106*    < > = values in this interval not displayed.    , CD4: No lab results found. and HIV  "RNA: No lab results found., Hepatitis B Testing:   Recent Labs   Lab Test 08/18/19  0123 06/18/18  1024 08/09/17  0819   HBCAB Nonreactive Nonreactive  --    HEPBANG  --  Nonreactive Negative   , Hepatitis C Testing:   Hepatitis C Antibody   Date Value Ref Range Status   08/18/2019 Nonreactive NR^Nonreactive Final     Comment:     Assay performance characteristics have not been established for newborns,   infants, and children     04/18/2019 Nonreactive NR^Nonreactive Final     Comment:     Assay performance characteristics have not been established for newborns,   infants, and children     , TB Quant: No lab results found.    Invalid input(s): \"[id number]\", \"[id number]\", Immune Globulin Studies:   Recent Labs   Lab Test 06/18/18  1024   IGG 1,960*      *   , HSV 1/2 IgG: No lab results found., HVS 1/2 PCR:   Recent Labs   Lab Test 09/05/19  1545   HSCSF1 Not Detected   HSCSF2 Not Detected   , VZV PCR:   Recent Labs   Lab Test 09/05/19  1549   VZRES VZV DNA Not Detected, presumed negative for Varicella zoster virus.   , and VZV IgG: No lab results found., Quantitative CMV PCR:   Recent Labs   Lab Test 10/05/19  0539   CMVQNT CMV DNA Not Detected   CMVLOG Not Calculated    and CMV IgG:   Recent Labs   Lab Test 08/17/19  2341 06/18/18  1024   CMVIGG >8.0* >8.0*   , and Quantitative EBV PCR:   Recent Labs   Lab Test 10/05/19  0539   EBRES EBV DNA Not Detected   EBLOG Not Calculated    and EBV EA IgG: No lab results found.                "

## 2024-07-30 NOTE — LETTER
7/30/2024       RE: Nicci Pemberton  [address] [place] [address]     Dear Colleague,    Thank you for referring your patient, Nicci Pemberton, to the [facility] INFECTIOUS DISEASE CLINIC [place] at [facility]. Please see a copy of my visit note below.    Virtual Visit Details    Type of service:  Video Visit   Time Started: 2:01 PM  Time Ended: 2:45 PM    Originating Location (pt. Location): Home    Distant Location (provider location):  Off-site  Platform used for Video Visit: [facility] INFECTIOUS DISEASE CLINIC [address] [address]  Phone: [phone]  Fax: [phone]    Patient:  Nicci Pemberton, Date of birth 1960  Date of Visit:  07/30/2024  Referring Provider Andressa Harper MD  Reason for visit: R knee PJI    Recommendations   R knee PJI  Continue ceftriaxone 2 g/day until she sees me in clinic on 8/27/24  Weekly CBC and BMP to monitor for toxicity.   Repeat ESR the week of 8/19/24  Her creatinine was 1.19 today, which is within her baseline range (0.9-1.2). Will continue to monitor weekly.   She will see me in-person in ID clinic on 8/27/24 so that I can see her joint in person and ensure that she can safely transition to PO antibiotics. We will plan to pull her PICC at that time.   After completing her ceftriaxone, we will plan to transition to PO amoxicillin 500 mg TID with plans to complete 3-6 months of antibiotics  Antibiotic Allergies  Have reviewed and updated antibiotic allergies.    Have removed vancomycin as a drug allergy, as her story is consistent with IV infiltrate with subsequent inflammation.     Follow-up with me on 8/27/24 in person in my clinic. We will plan to transition to PO antibiotics are remove PICC line at that time.     I spent 150 minutes on the date of the visit reviewing the patient's chart, interpreting laboratory and imaging studies,  talking with the patient, in documentation, and coordinating care.     The longitudinal plan of care for the prosthetic joint infection as documented were addressed during this visit. Due to the added complexity in care, I will continue to support Nicci in the subsequent management and with ongoing continuity of care.    Andressa Harper MD  Transplant Infectious Diseases Attending  [phone]      Assessment   Transplants:  8/18/2019 (Liver); POD  1808.  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 Developed R knee pain about ~2 days prior to presentation. Unclear the exact source of infection. At this point, we are using the debridement, antibiotics, implant retention (DAIR) strategy for management with attempt of infection eradication. She did have short duration of symptoms  (~3 days) prior to debridement and the fact that this is a Strep is a favorable prognosis for eradication.  However, her prosthesis was placed in 2020, which places her at higher risk for treatment failure under the DAIR strategy. Will plan for 6 weeks IV antibiotics from date of exchange (7/13/24-8/27/24) with plans to switch to PO amoxicillin to complete at least 3 months total antibiotics.         Antibiotic Allergies  Reviewed these with her today. She noted the following:   Ciprofloxacin, cefpodoxime, and linezolid: Had significant vertigo when she was taking a combination of these 3 antibiotics for her L knee incision infection back in 11/2021. Vertigo stopped after discontinuing all antibiotics. Of these antibiotics, ciprofloxacin is most associated with vertigo. Beta lactams are not particularly associated with vertigo and she has tolerated ceftriaxone. Linezolid also not associated with vertigo. She is  "willing to try these in the future if needed.     Trimethoprim/sulfamethoxazole: Had joint swelling in wrists with TMP/SMX, but no hives.   Vancomycin: Experienced what looks like an infiltrated IV at the time of vancomycin infusion on 7/13/24 (see photo from the media tab on 7/13 below). Resolved with removal of IV. Not a contraindication to receiving this in the future. I have removed this from the allergy tab.        Transplant Checklist  - QTc interval:  - Pneumocystis prophylaxis: None  - Bacterial prophylaxis: None  - Fungal prophylaxis: None  - Serostatus & viral prophylaxis: CMV D-/R+, HSV ?, EBV D+/R+ None  - Immunization status: Up-to-date on PCV-20 (2022), RSV (12/2023). Will need Fall COVID-19 booster when this is available in September.   - Gamma globulin status:   Recent Labs   Lab Test 06/18/18  1024   IGG 1,960*     - Antibiotic allergies: See above     Prior infectious diseases issues   L TKA would infection (10/2021): Underwent I&D (11/10/21). Wound cultures grew E faealis (S amp and vanc), Citrobacter koseri, MRSE, Strep oralis, Anaerococcus, Peptoniphilus asaccharolytic. Treated by orthopedics per documentation by Orthopedic consultation 7/12/2024: \"Keflex x 2 days switched to: Linezolid 600 mg p.o. twice daily x10 days, Vantin 200 mg p.o. twice daily x10 days. Then Vantin 200mg PO BID every day x 10days, linezolid 600mg PO BID daily x 10 days.\"   SARS-CoV-2: Has 2 documented infections. One in 12/2020 and one in 5/2021.       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 She was recently admitted from 7/12-7/16 with R knee pain. Found to have  Strep agalactiae prosthetic joint infection. We are seeing her today in " "follow-up from her hospital stay.     Her recent R knee PJI history is as follows:   ~7/10/24: Develops R knee pain. Had shaking chills night before knee pain developed with a low-grade temperature of 100.4. Had vomiting and some diarrhea.   7/12/24: R knee arthrocentesis with 60 mL cloudy synovial fluid. WBC 91,760 (96% PMNs). Growth of Strep agalactiae (Group B strep)  7/13/24: Underwent I&D with polyethylene liner exchange. Per op note, \"grossly evident purulent appearing fluid within knee joint\". Vancomycin powder was sprinkled in the joint. Intraoperative culture also grew Strep agalactiae.     Since discharge from the hospital, she has been doing well. Is due to get her sutures removed tomorrow at [facility]. Starting PT next Friday.     Prior Antibiotics  Ceftriaxone: 7/12-current  Vancomycin: 7/12-7/15      Vaccines     Immunization History   Administered Date(s) Administered     COVID-19 12+ (2023-24) (Pfizer) 11/14/2023     COVID-19 Bivalent 18+ (Moderna) 11/07/2022     COVID-19 Monovalent 18+ (Moderna) 03/18/2021, 04/15/2021, 02/15/2022     COVID-19 Monovalent Booster 18+ (Moderna) 07/12/2022     Flu 65+ Years 10/13/2020     Hepatitis B, Adult 09/21/2018, 10/22/2018, 01/17/2019, 04/09/2019, 06/05/2019     Influenza (High Dose) 3 valent vaccine 10/21/2019     Influenza (IIV3) PF 10/19/2011     Influenza Vaccine 18-64 (Flublok) 02/01/2022, 10/06/2022     Influenza Vaccine >6 months,quad, PF 11/07/2023     Influenza Vaccine, 6+MO IM (QUADRIVALENT W/PRESERVATIVES) 09/21/2018     Pneumococcal 20 valent Conjugate (Prevnar 20) 08/16/2022     Pneumococcal 23 valent 02/01/2005, 10/19/2011, 08/27/2020     RSV Vaccine (Abrysvo) 12/12/2023     RSV Vaccine (Arexvy) 11/01/2023, 12/12/2023     TD,PF 7+ (Tenivac) 10/02/1997     TDAP Vaccine (Boostrix) 06/23/2008, 09/21/2018     Zoster recombinant adjuvanted (SHINGRIX) 08/27/2020, 01/21/2021     Zoster vaccine, live 06/12/2016         Physical Exam   No vital signs taken, as " this was a virtual visit   Gen: Alert, oriented. Talking into the camera.   HEENT: NC/AT. No scleral icterus noted. Neck supple and moving in all directions.   CV: Appears well-perfused. No cyanosis noted per video visit.   Resp: Breathing comfortably on room air. No increased work of breathing noted.   Skin: No rashes/lesions noted on face or arms in the camera    Data     Data  Laboratory data and imaging listed below was reviewed prior to this encounter.     Microbiology:    Culture   Date Value Ref Range Status   07/13/2024 No anaerobic organisms isolated  Final   07/13/2024 No growth after 16 days  Preliminary   07/13/2024 (A)  Final    1+ Streptococcus agalactiae (Group B Streptococcus)     Comment:     Susceptibilities done on previous cultures   07/13/2024 No anaerobic organisms isolated  Final   07/13/2024 No growth after 16 days  Preliminary   07/13/2024 (A)  Final    1+ Streptococcus agalactiae (Group B Streptococcus)     Comment:     Susceptibilities done on previous cultures   07/13/2024 No anaerobic organisms isolated  Final   07/13/2024 No growth after 16 days  Preliminary   07/13/2024 (A)  Final    1+ Streptococcus agalactiae (Group B Streptococcus)     Comment:     Susceptibilities done on previous cultures   07/12/2024 No Growth  Final   07/12/2024 No Growth  Final   07/12/2024 (A)  Final    2+ Streptococcus agalactiae (Group B Streptococcus)   07/12/2024 No anaerobic organisms isolated  Final   11/10/2021 4+ Anaerococcus species (A)  Final     Comment:     Susceptibilities done on previous cultures   11/10/2021 2+ Peptoniphilus asaccharolyticus (A)  Final   11/10/2021 No Growth  Final   11/10/2021 No Growth  Final   11/10/2021 4+ Citrobacter koseri (A)  Final     Comment:     Susceptibilities done on previous cultures   11/10/2021 4+ Enterococcus faecalis (A)  Final     Comment:     Susceptibilities done on previous cultures   11/10/2021 4+ Streptococcus oralis (A)  Final     Comment:      Susceptibilities done on previous cultures   11/10/2021 4+ Citrobacter koseri (A)  Final     Comment:     Susceptibilities done on previous cultures   11/10/2021 Isolated in broth only Enterococcus faecalis (A)  Final     Comment:     On day 1 of incubation  Susceptibilities done on previous cultures   11/10/2021 4+ Anaerococcus species (A)  Final   11/10/2021 No Growth  Final   11/10/2021 4+ Citrobacter koseri (A)  Final   11/10/2021 4+ Enterococcus faecalis (A)  Final   11/10/2021 4+ Staphylococcus epidermidis (A)  Final   11/10/2021 3+ Streptococcus oralis (A)  Final   10/13/2020 No Growth  Final   08/05/2019 No Growth  Final   08/02/2019   Final    No Salmonella, Shigella, Yersinia, or Campylobacter.   07/31/2019 No Growth  Final   04/14/2019 Mixture of urogenital organisms  Final   05/04/2018 CITROBACTER KOSERI (A)  Final     Comment:     >100,000 col/ml Citrobacter koseri     GS Culture   Date Value Ref Range Status   07/13/2024 See corresponding culture for results  Final   07/13/2024 See corresponding culture for results  Final   07/13/2024 See corresponding culture for results  Final   , Inflammatory Markers:   Recent Labs   Lab Test 07/12/24  1003 12/10/20  0844 12/09/20  0637 12/08/20  0511 12/07/20  0559 12/06/20  0504 10/30/20  0649 10/28/20  1603 06/18/18  1024   SED 83*  --   --   --   --   --   --  100* 17   CRP  --  51.6* 87.0* 165.0* 210.0* 119.0* 54.7* 108.0* 8.9*   , Hematology Studies:    Recent Labs   Lab Test 07/15/24  0648 07/14/24  0645 07/13/24  0620 07/12/24  1003 05/29/24  1030 02/20/24  1028 11/28/23  1613 04/06/21  1018 03/29/21  1040 03/27/21  0934 03/25/21  1325 01/07/21  1146 12/29/20  1108 12/11/20  0523 12/10/20  0844 12/09/20  0637   WBC 8.0  --  9.7 14.7* 5.2 4.5 6.0   < > 18.4* 4.7 5.4   < > 4.6   < > 5.7 5.9   ANEU  --   --   --   --   --   --   --   --  16.0* 3.1 3.2  --  3.0  --  4.1 3.9   AEOS  --   --   --   --   --   --   --   --  0.0 0.2 0.2  --  0.2  --  0.1 0.2   HGB  "11.1* 11.6* 11.1* 12.1 13.3 13.2 13.1   < > 12.3 12.2 13.2   < > 12.0   < > 11.4* 10.6*   *  --  102* 99 100 97 101*   < > 98 97 97   < > 99   < > 99 99   *  --  89* 99* 153 148* 156   < > 155 127* 142*   < > 147*   < > 117* 106*    < > = values in this interval not displayed.    , CD4: No lab results found. and HIV RNA: No lab results found., Hepatitis B Testing:   Recent Labs   Lab Test 08/18/19  0123 06/18/18  1024 08/09/17  0819   HBCAB Nonreactive Nonreactive  --    HEPBANG  --  Nonreactive Negative   , Hepatitis C Testing:   Hepatitis C Antibody   Date Value Ref Range Status   08/18/2019 Nonreactive NR^Nonreactive Final     Comment:     Assay performance characteristics have not been established for newborns,   infants, and children     04/18/2019 Nonreactive NR^Nonreactive Final     Comment:     Assay performance characteristics have not been established for newborns,   infants, and children     , TB Quant: No lab results found.    Invalid input(s): \"[id number]\", \"[id number]\", Immune Globulin Studies:   Recent Labs   Lab Test 06/18/18  1024   IGG 1,960*      *   , HSV 1/2 IgG: No lab results found., HVS 1/2 PCR:   Recent Labs   Lab Test 09/05/19  1545   HSCSF1 Not Detected   HSCSF2 Not Detected   , VZV PCR:   Recent Labs   Lab Test 09/05/19  1549   VZRES VZV DNA Not Detected, presumed negative for Varicella zoster virus.   , and VZV IgG: No lab results found., Quantitative CMV PCR:   Recent Labs   Lab Test 10/05/19  0539   CMVQNT CMV DNA Not Detected   CMVLOG Not Calculated    and CMV IgG:   Recent Labs   Lab Test 08/17/19  2341 06/18/18  1024   CMVIGG >8.0* >8.0*   , and Quantitative EBV PCR:   Recent Labs   Lab Test 10/05/19  0539   EBRES EBV DNA Not Detected   EBLOG Not Calculated    and EBV EA IgG: No lab results found.                  Again, thank you for allowing me to participate in the care of your patient.      Sincerely,    Andressa Harper MD    "

## 2024-07-30 NOTE — NURSING NOTE
Current patient location: [address] [place] [address]    Is the patient currently in the state of [place]? YES    Visit mode:VIDEO    If the visit is dropped, the patient can be reconnected by: VIDEO VISIT: Text to cell phone:   Telephone Information:   Mobile [phone]       Will anyone else be joining the visit? NO  (If patient encounters technical issues they should call [phone]    How would you like to obtain your AVS? MyChart    Are changes needed to the allergy or medication list? Pt stated no med changes    Are refills needed on medications prescribed by this physician? NO    Reason for visit: RECHECK    No other vitals to report per pt    Pamela JESUSF

## 2024-07-30 NOTE — TELEPHONE ENCOUNTER
7/29 -  Creatinine  0.40 - 1.00 mg/dL 1.11 High      7/23  Creatinine  0.40 - 1.00 mg/dL 0.98       Will send to provider to review.       Eder Jones RN  Infectious Disease on 7/30/2024 at 1:04 PM    
Reviewed Vaishali Creatinine. Her most recent creatinine of 1.1 is within her recent baselines, which range from 0.9-1.2. Will plan to repeat creatinine again next week. If this increases, then will consider transitioning to alternate antibiotic.   
Resident

## 2024-07-30 NOTE — PATIENT INSTRUCTIONS
Thank you for seeing us in clinic today. Overall, I am very happy with your progress. Your inflammation is coming down nicely. Our plan is as follows:     Continue your IV ceftriaxone until I see you in clinic again on August 27th  We will remove your PICC line and switch you over to oral antibiotics (likely amoxicillin) at your next visit, assuming that everything goes well.   Continue getting your weekly labs. I will be watching your kidney numbers (creatinine) closely.   Make sure you are staying hydrated   Let me know if there are any issues with the knee (if there is pus coming out, if it gets more painful or more red).     Otherwise, we will plan to see you in clinic on August 27th. Let us know if you cannot keep your in-person visit and we can switch this to a virtual visit.

## 2024-07-31 ENCOUNTER — OFFICE VISIT (OUTPATIENT)
Dept: ORTHOPEDICS | Facility: CLINIC | Age: 64
End: 2024-07-31
Payer: COMMERCIAL

## 2024-07-31 DIAGNOSIS — Z98.890 POST-OPERATIVE STATE: Primary | ICD-10-CM

## 2024-07-31 PROCEDURE — 99207 PR NO CHARGE NURSE ONLY: CPT

## 2024-07-31 NOTE — PROGRESS NOTES
[unfilled]    Suture removal    Date/Time: 7/31/2024 11:45 AM    Performed by: Mc Santos ATC  Authorized by: Mina Black MD  Body area: lower extremity  Location details: right knee      UNIVERSAL PROTOCOL   Site Marked: NA  Prior Images Obtained and Reviewed:  NA  Required items: Required blood products, implants, devices and special equipment available    Patient identity confirmed:  Verbally with patient  Patient was reevaluated immediately before administering moderate or deep sedation or anesthesia  Confirmation Checklist:  Patient's identity using two indicators, relevant allergies, procedure was appropriate and matched the consent or emergent situation and correct equipment/implants were available  Time out: Immediately prior to the procedure a time out was called    Universal Protocol: the Joint Commission Universal Protocol was followed    Preparation: Patient was prepped and draped in usual sterile fashion      Wound Appearance: clean  Sutures Removed: 15  Post-removal: Steri-Strips applied and dressing applied      PROCEDURE    Patient Tolerance:  Patient tolerated the procedure well with no immediate complications  Length of time physician/provider present for 1:1 monitoring during sedation: 0

## 2024-07-31 NOTE — PROGRESS NOTES
Reason for visit:    Nicci Pemberton came in to the clinic for a two week post op check.    Her surgery was done 7/15/24 by Dr Black.  She had right TKA irrigation and debridement due to periprosthetic infection.     Assessment:    Nicci came into the clinic WBAT with walker assistance.    The Surgical wounds were exposed and reviewed with Staci John PA-C and found to be well-healed; so the sutures were removed.    Steri strips were applied as well as Mepilex over the central incision where there is a small, raised scab.  Staci John PA-C advised using Mepilex for coverage and also padding, educating patient on avoiding contact or kneeling.      Plan:    She was educated on dressing changes, hygiene, s/s of infection and to reach out to us through [employer] with any concerns or questions.     She has an appointment to see Dr. Black on Friday, 9/6/24 and at that time Dr. Black will determine further restrictions.    All questions were answered. She has our phone number and will call with additional questions or problems.    Mc Santos, MS, ATC, LAT, [facility]  Dr. Shankar John PA-C is the overseeing provider on site today.

## 2024-08-05 ENCOUNTER — TELEPHONE (OUTPATIENT)
Dept: ORTHOPEDICS | Facility: CLINIC | Age: 64
End: 2024-08-05
Payer: COMMERCIAL

## 2024-08-05 ENCOUNTER — MYC MEDICAL ADVICE (OUTPATIENT)
Dept: ORTHOPEDICS | Facility: CLINIC | Age: 64
End: 2024-08-05
Payer: COMMERCIAL

## 2024-08-05 ENCOUNTER — TELEPHONE (OUTPATIENT)
Dept: TRANSPLANT | Facility: CLINIC | Age: 64
End: 2024-08-05
Payer: COMMERCIAL

## 2024-08-05 NOTE — TELEPHONE ENCOUNTER
Spoke with Nicci. She would like to do CSA level within homecare since she is in pain and hard to get to clinic. CSA level drawn today.    Order faxed to Fax: [phone] for CSA add on.

## 2024-08-05 NOTE — TELEPHONE ENCOUNTER
Other: pt called, pt was in last week and had sutures taken out. Was supposed to send pictures. Would like for Mc Santos ATC to reach out.      [facility] or would you prefer to receive a phone call?:   Patient would prefer a phone call   Okay to leave a detailed message?: Yes at Home number on file [phone] (home)

## 2024-08-05 NOTE — TELEPHONE ENCOUNTER
Nicci was called today and provided direction regarding incision care and dressing changes S/P  right TKA irrigation and debridement due to periprosthetic infection. Nicci can begin showering over the next 1-2 days.  Nicci understands she should not soak/submerge incision for any reason. Patient understands and agrees with plan.

## 2024-08-05 NOTE — TELEPHONE ENCOUNTER
ATC called patient.  She was having trouble locating Dr. Black in her MyChart to send us a message including photos of her incision.  She has no concerns about infection, but just wanted to update the clinic staff.  [facility] will send her a [employer] message to start the conversation so she can reply and attach photos.    Mc Santos MS, ATC, [facility], [facility]  Dr. Shankar Black

## 2024-08-05 NOTE — LETTER
OUTPATIENT OR TRANSITIONAL CARE  LABORATORY TEST ORDER    Patient Name: Nicci Pemberton  Transplant Date: 8/18/2019   YOB: 1960  Issue Date: 08/05/24   [facility] MR#:[id number]  Expiration Date: 8/31/24       Diagnoses: [x]      Liver Transplant (ICD-10 Z94.4)    [x]      Long term use of medications (ICD-10 Z79.899)     Please fax results to [phone]    Frequency: one time add on to specimen from 8/5/24          [x] Cyclosporine drug level - 12 hour trough, please document time of last dose        If you have questions, please call [phone] or [phone].      Thomas M. Leventhal, M.D.   of Medicine  Advanced & Transplant Hepatology  [facility]

## 2024-08-10 LAB
BACTERIA SNV CULT: NO GROWTH
BACTERIA TISS BX CULT: NO GROWTH
BACTERIA TISS BX CULT: NO GROWTH

## 2024-08-20 ENCOUNTER — MYC MEDICAL ADVICE (OUTPATIENT)
Dept: INFECTIOUS DISEASES | Facility: CLINIC | Age: 64
End: 2024-08-20
Payer: COMMERCIAL

## 2024-08-20 ENCOUNTER — DOCUMENTATION ONLY (OUTPATIENT)
Dept: INFECTIOUS DISEASES | Facility: CLINIC | Age: 64
End: 2024-08-20
Payer: COMMERCIAL

## 2024-08-20 DIAGNOSIS — M00.261 STREPTOCOCCAL ARTHRITIS OF RIGHT KNEE (H): Primary | ICD-10-CM

## 2024-08-20 NOTE — PROGRESS NOTES
Let's go ahead and transition her to PO antibiotics on 8/24. I'll send an order for amoxicillin 1 G TID to start after her last dose of IV antibiotics. We can pull her PICC line once the IV antibiotics are completed.

## 2024-08-20 NOTE — PROGRESS NOTES
Weekly OPAT Monitoring    Last Clinic Visit: 7/30/24    Next Clinic Visit: 8/27    ID Provider: Dr. Harper    Current LOT: 8/25/24     Infusion Company: [facility]     Labs drawn at: [facility] and [facility]     Weekly Lab Orders: CBC with diff, CMP, and CRP.    Last lab date: 8/19/24    Abnormal Labs: no   (If yes, see separate documentation)    Action Needed At This Time: sent to provider to review and to comment on LOT as it is currently 8/24 although next appt is 8/27 does she want to extend LOT?       Eder Jones RN  Infectious Disease on 8/20/2024 at 2:30 PM

## 2024-08-21 RX ORDER — AMOXICILLIN 500 MG/1
1000 CAPSULE ORAL 3 TIMES DAILY
Qty: 180 CAPSULE | Refills: 1 | Status: SHIPPED | OUTPATIENT
Start: 2024-08-21 | End: 2024-10-01

## 2024-08-21 NOTE — PROGRESS NOTES
Called patient and left message for patient to call writer back on direct line.  Eder Jones RN  Infectious Disease on 8/21/2024 at 9:40 AM

## 2024-08-21 NOTE — PROGRESS NOTES
Responded to patient via separate enc via [employer].   Eder Jones RN  Infectious Disease on 8/21/2024 at 3:04 PM

## 2024-08-21 NOTE — PROGRESS NOTES
Call received from Genet, Home Health nurse-she reports that she spoke to pt about her discharge from home health and getting her PICC line removed, but [facility] would need formal orders in order to make that happen.   She would like to confirm that pt's PICC line removal and her discharge from home health will be on the same day?   Would like orders sent to her at (f) [phone].   For any questions, please call Genet at (ph) [phone].

## 2024-08-26 ENCOUNTER — TELEPHONE (OUTPATIENT)
Dept: INFECTIOUS DISEASES | Facility: CLINIC | Age: 64
End: 2024-08-26
Payer: COMMERCIAL

## 2024-08-26 ENCOUNTER — INFUSION THERAPY VISIT (OUTPATIENT)
Dept: INFUSION THERAPY | Facility: CLINIC | Age: 64
End: 2024-08-26
Attending: INTERNAL MEDICINE
Payer: COMMERCIAL

## 2024-08-26 DIAGNOSIS — Z45.2 ENCOUNTER FOR REMOVAL OF PERIPHERALLY INSERTED CENTRAL CATHETER (PICC): Primary | ICD-10-CM

## 2024-08-26 NOTE — TELEPHONE ENCOUNTER
Called [facility] to see if they would be able to get the patient in today for PICC removal. Spoke with ED Jackson they are going to check for today or tomorrow. While on the phone the infusion center had an emergency and will have to call back to writers direct line.     Called patient to inform of plan to keep tomorrows appt as a virtual if able. She is very hopeful to get an appt in [place] to get picc pulled today or tomorrow. At this time will keep appt virtual.

## 2024-08-26 NOTE — TELEPHONE ENCOUNTER
[facility] Call [facility]    Phone Message    May a detailed message be left on voicemail: yes     Reason for Call: Pt's home care nurses through [facility] came to her home today to remove her PICC line, but ultimately were not able to remove it. Pt reports that when they tried to remove it, it appeared almost as if the part they were pulling on was the vertical part of a 'T', so when they pulled on it, there were two horizontal pieces on the side that were holding everything in place. Her home care nurses informed her they were not familiar with this type of PICC line, and did not have the tools to remove it. They re-dressed the PICC line area and instructed her to speak with Dr. Harper's office regarding having this removed (PICC line was placed by her  orthopedic provider-Dr. Black's office).  Pt currently has an appt scheduled with Dr. Harper for tomorrow (8/27/24), it is scheduled as a virtual visit, but if PICC line can be removed at that appt, pt would be fine with changing this to an in-person appt. If PICC line cannot be removed at that appt, pt is hoping this can be done at a hospital in her area since it's a very far drive for her to come to the [facility].     Pt would like to hear back on this as soon as possible so she can know if she will need to drive down for tomorrow's appt.     Action Taken: Other: Infectious Disease    Travel Screening: Not Applicable

## 2024-08-26 NOTE — TELEPHONE ENCOUNTER
Confirmed with ED Jackson at [facility] pt will have appt 08/26/24 at 2. Patient is aware and excited to have PICC out today and will keep virtual appt tomorrow with Dr. Harper.

## 2024-08-26 NOTE — PROGRESS NOTES
Power picc removed intact.  Site clean a dry.  Pressure dsg of gauze and bacitracin ointment applied.  Pt instructed to keep dsg on and dry for 24 hrs.  Extra guaze and wrap given for home if needed. Sulma Hall RN

## 2024-08-27 ENCOUNTER — VIRTUAL VISIT (OUTPATIENT)
Dept: INFECTIOUS DISEASES | Facility: CLINIC | Age: 64
End: 2024-08-27
Attending: INTERNAL MEDICINE
Payer: COMMERCIAL

## 2024-08-27 VITALS
BODY MASS INDEX: 40.12 KG/M2 | DIASTOLIC BLOOD PRESSURE: 80 MMHG | SYSTOLIC BLOOD PRESSURE: 112 MMHG | HEART RATE: 76 BPM | WEIGHT: 218 LBS | HEIGHT: 62 IN

## 2024-08-27 DIAGNOSIS — T84.50XD INFECTION OF PROSTHETIC JOINT, SUBSEQUENT ENCOUNTER: ICD-10-CM

## 2024-08-27 DIAGNOSIS — D72.10 EOSINOPHILIA, UNSPECIFIED TYPE: ICD-10-CM

## 2024-08-27 DIAGNOSIS — M00.261 STREPTOCOCCAL ARTHRITIS OF RIGHT KNEE (H): Primary | ICD-10-CM

## 2024-08-27 PROCEDURE — G2211 COMPLEX E/M VISIT ADD ON: HCPCS | Mod: 95 | Performed by: INTERNAL MEDICINE

## 2024-08-27 PROCEDURE — 99215 OFFICE O/P EST HI 40 MIN: CPT | Mod: 24 | Performed by: INTERNAL MEDICINE

## 2024-08-27 RX ORDER — CEFADROXIL 500 MG/1
1000 CAPSULE ORAL 2 TIMES DAILY
Qty: 360 CAPSULE | Refills: 0 | Status: SHIPPED | OUTPATIENT
Start: 2024-08-27 | End: 2024-10-01

## 2024-08-27 ASSESSMENT — PAIN SCALES - GENERAL: PAINLEVEL: MILD PAIN (3)

## 2024-08-27 NOTE — PATIENT INSTRUCTIONS
It was great to see you today! We discussed the following:   We will plan to have you on antibiotics for at least 6 months total (Through February 2025).   We will repeat your blood cell counts and kidney tests every 2 weeks for the next 2 months. Feel free to send me a [employer] message when you get your labs done so I can follow these closely.   We will make the decision together whether you should continue with suppressive antibiotics after completing your 6 month course of treatment. Overall, I'd favor doing at least 12 months of antibiotics since you are immunocompromised and prosthetic knee infections are at slightly higher risk of recurring.   Continue your amoxicillin 1000 mg three times per day.   We discussed getting you access to the medication cefadroxil, since this is a twice daily medication. I'm going to call you in a prescription to see if your insurance company covers this. I'm happy to do the prior authorization process to if it is denied.   If cefadroxil is denied by your insurance company, use GoodRx.com to search for the medication and give the code to your pharmacy (they can also help you with this process. Alternately, you can sign up for [facility]. I believe it should cost $20-30 for a 1 month supply through either of these.   Get your COVID-19 and flu shots when they come out in September.     Follow-up with me in 4-6 weeks to discuss your oral antibiotics.

## 2024-08-27 NOTE — NURSING NOTE
Current patient location: [address] [place] [address]    Is the patient currently in the state of [place]? YES    Visit mode:VIDEO    If the visit is dropped, the patient can be reconnected by: VIDEO VISIT: Text to cell phone:   Telephone Information:   Mobile [phone]       Will anyone else be joining the visit? NO  (If patient encounters technical issues they should call [phone]    How would you like to obtain your AVS? MyChart    Are changes needed to the allergy or medication list? No    Are refills needed on medications prescribed by this physician? NO    Rooming Documentation:  Questionnaire(s) not pre-assigned      Reason for visit: RECHECK    No other vitals to report per pt    Pamela Walton VVF

## 2024-08-27 NOTE — PROGRESS NOTES
Virtual Visit Details    Type of service:  Video Visit     Originating Location (pt. Location): Home    Distant Location (provider location):  On-site  Platform used for Video Visit: [facility] [address] [address]  Phone: [phone]  Fax: [phone]    Patient:  Nicci Pemberton, Date of birth 1960  Date of Visit:  08/27/2024  Referring Provider Andressa Harper MD  Reason for visit: R knee PJI    Recommendations   R knee PJI  She completed 6 weeks of ceftriaxone on 8/25/24.   Continue amoxicillin 1000 mg TID with plans to complete at least 6 months of oral antibiotics.   We discussed transitioning to cefadroxil 1000 mg BID after this month of antibiotics is completed, given twice instead of three times daily dosing.   I will order this through her insurance and try to go through prior authorization.   If prior authorization this is refused, she can access this through either [employer] or [email or web address] for ~$20-30 per month. She and I discussed this process together.   We will decide together with her orthopedic surgeon regarding plan for suppressive therapy thereafter. Overall, I'd favor at least 12 months of suppressive antibiotics, as she is an SOT patient with a prosthetic knee infection.   Bimonthly CBC with differential and BMP for the first 2 months then monthly thereafter.     Mild Eosinophilia  Likely 2/2 long-term CTX use. No risk for Strongyloides or Schistosomiasis  Repeat CBC with diff next week to ensure improvement    Follow-up with me in 4-6 weeks to see how things are going with transition to oral antibiotics.     I spent 56 minutes on the date of the visit reviewing the patient's chart, interpreting laboratory and imaging studies, talking with the patient, in documentation, and coordinating care.     The longitudinal plan of care for the prosthetic joint infection as documented were addressed during  this visit. Due to the added complexity in care, I will continue to support Nicci in the subsequent management and with ongoing continuity of care.    Andressa Harper MD  Transplant Infectious Diseases Attending  [phone]      Assessment   Transplants:  8/18/2019 (Liver); POD  1836.  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 She was recently admitted from 7/12-7/16 with R knee pain. Found to have  Strep agalactiae prosthetic joint infection. We are seeing her today in follow-up from her hospital stay.     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 Developed R knee pain about ~2 days prior to presentation. Unclear the exact source of infection. At this point, we are using the debridement, antibiotics, implant retention (DAIR) strategy for management with attempt of infection eradication. She did have short duration of symptoms (~3 days) prior to debridement and the fact that this is a Strep is a favorable prognosis for eradication.  However, her prosthesis was placed in 2020, which places her at higher risk for treatment failure under the DAIR strategy. Has completed 6 weeks of IV antibiotics (7/13/24-8/27/24). Switched to PO amoxicillin while attempting to obtain access to cefadroxil (has BID dosing instead of TID dosing). We will complete at least 6 months of antibiotics. Decision regarding long-term suppression to be made together. I'd favor at least 12 months of suppressive oral antibiotics as long as she is tolerating this.         Mild Eosinophilia (0.6-0.7)  No risk for  "Strongy/schisto. Likely mild reaction to long-term use of CTX. Will trend now that she is off ceftriaxone. Will not list as an allergy at this point, as we will trend now that she is off CTX to ensure no additional work-up needed.     Transplant Checklist  - QTc interval: 414 (9/2021)  - Pneumocystis prophylaxis: None  - Bacterial prophylaxis: Amoxicillin 1000 mg TID for ongoing treatment of joint infection.   - Fungal prophylaxis: None  - Serostatus & viral prophylaxis: CMV D-/R+, HSV ?, EBV D+/R+ None  - Immunization status: Up-to-date on PCV-20 (2022), RSV (12/2023). Will need Fall COVID-19 booster when this is available in September.   - Gamma globulin status:   Recent Labs   Lab Test 06/18/18  1024   IGG 1,960*     - Antibiotic allergies:   Experienced vertigo and GI symptoms while on ciprofloxacin, cefpodoxime, and linezolid back in 11/2021. Vertigo stopped after discontinuing all antibiotics. Not true allergy. Ciprofloxacin most associated with vertigo.   Had IV infiltrate when give vancomycin in 6/2024. Not a true allergy so de-labeled.      Prior infectious diseases issues   L TKA would infection (10/2021): Underwent I&D (11/10/21). Wound cultures grew E faealis (S amp and vanc), Citrobacter koseri, MRSE, Strep oralis, Anaerococcus, Peptoniphilus asaccharolytic. Treated by orthopedics per documentation by Orthopedic consultation 7/12/2024: \"Keflex x 2 days switched to: Linezolid 600 mg p.o. twice daily x10 days, Vantin 200 mg p.o. twice daily x10 days. Then Vantin 200mg PO BID every day x 10days, linezolid 600mg PO BID daily x 10 days.\"   SARS-CoV-2: Has 2 documented infections. One in 12/2020 and one in 5/2021.     Interval Events    Has started PT, which is somewhat painful.   Was able to remove her PICC line at [facility].   Was able to get the oral antibiotic prescription and started yesterday.     Exposure History   Demographics: Lived in [place].    Travel: Has spent time in [place] (stayed at a " "resort) and [place]. No time in [place]. No other overseas travel. In the [place] traveled around the [place] throughout the country not spending much time in each city. [place] travel[place] For her job, was on [employer]. [place] for vacation.     Prior Antibiotics  Amoxicillin: 8/26-current  Ceftriaxone: 7/12-8/25/24  Vancomycin: 7/12-7/15    History of Presenting Illness    Her recent R knee PJI history is as follows:   ~7/10/24: Develops R knee pain. Had shaking chills night before knee pain developed with a low-grade temperature of 100.4. Had vomiting and some diarrhea.   7/12/24: R knee arthrocentesis with 60 mL cloudy synovial fluid. WBC 91,760 (96% PMNs). Growth of Strep agalactiae (Group B strep)  7/13/24: Underwent I&D with polyethylene liner exchange. Per op note, \"grossly evident purulent appearing fluid within knee joint\". Vancomycin powder was sprinkled in the joint. Intraoperative culture also grew Strep agalactiae.     Vaccines     Immunization History   Administered Date(s) Administered    COVID-19 12+ (2023-24) (Pfizer) 11/14/2023    COVID-19 Bivalent 18+ (Moderna) 11/07/2022    COVID-19 Monovalent 18+ (Moderna) 03/18/2021, 04/15/2021, 02/15/2022    COVID-19 Monovalent Booster 18+ (Moderna) 07/12/2022    Flu 65+ Years 10/13/2020    Hepatitis B, Adult 09/21/2018, 10/22/2018, 01/17/2019, 04/09/2019, 06/05/2019    Influenza (High Dose) 3 valent vaccine 10/21/2019    Influenza (IIV3) PF 10/19/2011    Influenza Vaccine 18-64 (Flublok) 02/01/2022, 10/06/2022    Influenza Vaccine >6 months,quad, PF 11/07/2023    Influenza Vaccine, 6+MO IM (QUADRIVALENT W/PRESERVATIVES) 09/21/2018    Pneumococcal 20 valent Conjugate (Prevnar 20) 08/16/2022    Pneumococcal 23 valent 02/01/2005, 10/19/2011, 08/27/2020    RSV Vaccine (Abrysvo) 12/12/2023    RSV Vaccine (Arexvy) 11/01/2023, 12/12/2023    TD,PF 7+ ([facility]) 10/02/1997    TDAP (Adacel,Boostrix) 09/21/2018    " TDAP Vaccine (Boostrix) 06/23/2008    Zoster recombinant adjuvanted (SHINGRIX) 08/27/2020, 01/21/2021    Zoster vaccine, live 06/12/2016         Physical Exam   No vital signs taken, as this was a virtual visit   Gen: Alert, oriented. Talking into the camera.   HEENT: NC/AT. No scleral icterus noted. Neck supple and moving in all directions.   CV: Appears well-perfused. No cyanosis noted per video visit.   Resp: Breathing comfortably on room air. No increased work of breathing noted.   Skin: No rashes/lesions noted on face or arms in the camera    Data     Data   Laboratory data and imaging listed below was reviewed prior to this encounter.     Microbiology:    Culture   Date Value Ref Range Status   07/13/2024 No anaerobic organisms isolated  Final   07/13/2024 No Growth  Final   07/13/2024 (A)  Final    1+ Streptococcus agalactiae (Group B Streptococcus)     Comment:     Susceptibilities done on previous cultures   07/13/2024 No anaerobic organisms isolated  Final   07/13/2024 No Growth  Final   07/13/2024 (A)  Final    1+ Streptococcus agalactiae (Group B Streptococcus)     Comment:     Susceptibilities done on previous cultures   07/13/2024 No anaerobic organisms isolated  Final   07/13/2024 No Growth  Final   07/13/2024 (A)  Final    1+ Streptococcus agalactiae (Group B Streptococcus)     Comment:     Susceptibilities done on previous cultures   07/12/2024 No Growth  Final   07/12/2024 No Growth  Final   07/12/2024 (A)  Final    2+ Streptococcus agalactiae (Group B Streptococcus)   07/12/2024 No anaerobic organisms isolated  Final   11/10/2021 4+ Anaerococcus species (A)  Final     Comment:     Susceptibilities done on previous cultures   11/10/2021 2+ Peptoniphilus asaccharolyticus (A)  Final   11/10/2021 No Growth  Final   11/10/2021 No Growth  Final   11/10/2021 4+ Citrobacter koseri (A)  Final     Comment:     Susceptibilities done on previous cultures   11/10/2021 4+ Enterococcus faecalis (A)  Final      Comment:     Susceptibilities done on previous cultures   11/10/2021 4+ Streptococcus oralis (A)  Final     Comment:     Susceptibilities done on previous cultures   11/10/2021 4+ Citrobacter koseri (A)  Final     Comment:     Susceptibilities done on previous cultures   11/10/2021 Isolated in broth only Enterococcus faecalis (A)  Final     Comment:     On day 1 of incubation  Susceptibilities done on previous cultures   11/10/2021 4+ Anaerococcus species (A)  Final   11/10/2021 No Growth  Final   11/10/2021 4+ Citrobacter koseri (A)  Final   11/10/2021 4+ Enterococcus faecalis (A)  Final   11/10/2021 4+ Staphylococcus epidermidis (A)  Final   11/10/2021 3+ Streptococcus oralis (A)  Final   10/13/2020 No Growth  Final   08/05/2019 No Growth  Final   08/02/2019   Final    No Salmonella, Shigella, Yersinia, or Campylobacter.   07/31/2019 No Growth  Final   04/14/2019 Mixture of urogenital organisms  Final   05/04/2018 CITROBACTER KOSERI (A)  Final     Comment:     >100,000 col/ml Citrobacter koseri     GS Culture   Date Value Ref Range Status   07/13/2024 See corresponding culture for results  Final   07/13/2024 See corresponding culture for results  Final   07/13/2024 See corresponding culture for results  Final   , Inflammatory Markers:   Recent Labs   Lab Test 07/12/24  1003 12/10/20  0844 12/09/20  0637 12/08/20  0511 12/07/20  0559 12/06/20  0504 10/30/20  0649 10/28/20  1603 06/18/18  1024   SED 83*  --   --   --   --   --   --  100* 17   CRP  --  51.6* 87.0* 165.0* 210.0* 119.0* 54.7* 108.0* 8.9*   , Hematology Studies:    Recent Labs   Lab Test 07/15/24  0648 07/14/24  0645 07/13/24  0620 07/12/24  1003 05/29/24  1030 02/20/24  1028 11/28/23  1613 04/06/21  1018 03/29/21  1040 03/27/21  0934 03/25/21  1325 01/07/21  1146 12/29/20  1108 12/11/20  0523 12/10/20  0844 12/09/20  0637   WBC 8.0  --  9.7 14.7* 5.2 4.5 6.0   < > 18.4* 4.7 5.4   < > 4.6   < > 5.7 5.9   ANEU  --   --   --   --   --   --   --   --   "16.0* 3.1 3.2  --  3.0  --  4.1 3.9   AEOS  --   --   --   --   --   --   --   --  0.0 0.2 0.2  --  0.2  --  0.1 0.2   HGB 11.1* 11.6* 11.1* 12.1 13.3 13.2 13.1   < > 12.3 12.2 13.2   < > 12.0   < > 11.4* 10.6*   *  --  102* 99 100 97 101*   < > 98 97 97   < > 99   < > 99 99   *  --  89* 99* 153 148* 156   < > 155 127* 142*   < > 147*   < > 117* 106*    < > = values in this interval not displayed.    , CD4: No lab results found. and HIV RNA: No lab results found., Hepatitis B Testing:   Recent Labs   Lab Test 08/18/19  0123 06/18/18  1024 08/09/17  0819   HBCAB Nonreactive Nonreactive  --    HEPBANG  --  Nonreactive Negative   , Hepatitis C Testing:   Hepatitis C Antibody   Date Value Ref Range Status   08/18/2019 Nonreactive NR^Nonreactive Final     Comment:     Assay performance characteristics have not been established for newborns,   infants, and children     04/18/2019 Nonreactive NR^Nonreactive Final     Comment:     Assay performance characteristics have not been established for newborns,   infants, and children     , TB Quant: No lab results found.    Invalid input(s): \"[id number]\", \"[id number]\", Immune Globulin Studies:   Recent Labs   Lab Test 06/18/18  1024   IGG 1,960*      *   , HSV 1/2 IgG: No lab results found., HVS 1/2 PCR:   Recent Labs   Lab Test 09/05/19  1545   HSCSF1 Not Detected   HSCSF2 Not Detected   , VZV PCR:   Recent Labs   Lab Test 09/05/19  1549   VZRES VZV DNA Not Detected, presumed negative for Varicella zoster virus.   , and VZV IgG: No lab results found., Quantitative CMV PCR:   Recent Labs   Lab Test 10/05/19  0539   CMVQNT CMV DNA Not Detected   CMVLOG Not Calculated    and CMV IgG:   Recent Labs   Lab Test 08/17/19  2341 06/18/18  1024   CMVIGG >8.0* >8.0*   , and Quantitative EBV PCR:   Recent Labs   Lab Test 10/05/19  0539   EBRES EBV DNA Not Detected   EBLOG Not Calculated    and EBV EA IgG: No lab results found.                "

## 2024-08-27 NOTE — Clinical Note
Will be getting biweekly CBC with diff and CMP for the next 2 months. Can we fax these labs to Will go to [facility] in [place]?   Thanks!

## 2024-08-27 NOTE — LETTER
8/27/2024       RE: Nicci Pemberton  [address] [place] [address]     Dear Colleague,    Thank you for referring your patient, Nicci Pemberton, to the [facility] DISE[facility] [place] at [facility]. Please see a copy of my visit note below.    Virtual Visit Details    Type of service:  Video Visit     Originating Location (pt. Location): Home    Distant Location (provider location):  On-site  Platform used for Video Visit: [facility] DISE[facility]IN[facility] [place]  [address] [place] [id number]  Phone: [phone]  Fax: [phone]    Patient:  Nicci Pemberton, Date of birth 1960  Date of Visit:  08/27/2024  Referring Provider Andressa Harper MD  Reason for visit: R knee PJI    Recommendations   R knee PJI  She completed 6 weeks of ceftriaxone on 8/25/24.   Continue amoxicillin 1000 mg TID with plans to complete at least 6 months of oral antibiotics.   We discussed transitioning to cefadroxil 1000 mg BID after this month of antibiotics is completed, given twice instead of three times daily dosing.   I will order this through her insurance and try to go through prior authorization.   If prior authorization this is refused, she can access this through either [employer] or [employer] for ~$20-30 per month. She and I discussed this process together.   We will decide together with her orthopedic surgeon regarding plan for suppressive therapy thereafter. Overall, I'd favor at least 12 months of suppressive antibiotics, as she is an SOT patient with a prosthetic knee infection.   Bimonthly CBC with differential and BMP for the first 2 months then monthly thereafter.     Mild Eosinophilia  Likely 2/2 long-term CTX use. No risk for Strongyloides or Schistosomiasis  Repeat CBC with diff next week to ensure improvement    Follow-up with me in 4-6 weeks to see how things are going with transition  to oral antibiotics.     I spent 56 minutes on the date of the visit reviewing the patient's chart, interpreting laboratory and imaging studies, talking with the patient, in documentation, and coordinating care.     The longitudinal plan of care for the prosthetic joint infection as documented were addressed during this visit. Due to the added complexity in care, I will continue to support Nicci in the subsequent management and with ongoing continuity of care.    Andressa Harper MD  Transplant Infectious Diseases Attending  [phone]      Assessment   Transplants:  8/18/2019 (Liver); POD  1836.  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 She was recently admitted from 7/12-7/16 with R knee pain. Found to have  Strep agalactiae prosthetic joint infection. We are seeing her today in follow-up from her hospital stay.     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 Developed R knee pain about ~2 days prior to presentation. Unclear the exact source of infection. At this point, we are using the debridement, antibiotics, implant retention (DAIR) strategy for management with attempt of infection eradication. She did have short duration of symptoms (~3 days) prior to debridement and the fact that this is a Strep is a favorable prognosis for eradication.  However, her prosthesis was placed in 2020, which places her at higher risk for treatment failure under the DAIR strategy. Has completed 6 weeks of IV antibiotics (7/13/24-8/27/24). Switched to PO amoxicillin while attempting to obtain access to  "cefadroxil (has BID dosing instead of TID dosing). We will complete at least 6 months of antibiotics. Decision regarding long-term suppression to be made together. I'd favor at least 12 months of suppressive oral antibiotics as long as she is tolerating this.         Mild Eosinophilia (0.6-0.7)  No risk for Strongy/schisto. Likely mild reaction to long-term use of CTX. Will trend now that she is off ceftriaxone. Will not list as an allergy at this point, as we will trend now that she is off CTX to ensure no additional work-up needed.     Transplant Checklist  - QTc interval: 414 (9/2021)  - Pneumocystis prophylaxis: None  - Bacterial prophylaxis: Amoxicillin 1000 mg TID for ongoing treatment of joint infection.   - Fungal prophylaxis: None  - Serostatus & viral prophylaxis: CMV D-/R+, HSV ?, EBV D+/R+ None  - Immunization status: Up-to-date on PCV-20 (2022), RSV (12/2023). Will need Fall COVID-19 booster when this is available in September.   - Gamma globulin status:   Recent Labs   Lab Test 06/18/18  1024   IGG 1,960*     - Antibiotic allergies:   Experienced vertigo and GI symptoms while on ciprofloxacin, cefpodoxime, and linezolid back in 11/2021. Vertigo stopped after discontinuing all antibiotics. Not true allergy. Ciprofloxacin most associated with vertigo.   Had IV infiltrate when give vancomycin in 6/2024. Not a true allergy so de-labeled.      Prior infectious diseases issues   L TKA would infection (10/2021): Underwent I&D (11/10/21). Wound cultures grew E faealis (S amp and vanc), Citrobacter koseri, MRSE, Strep oralis, Anaerococcus, Peptoniphilus asaccharolytic. Treated by orthopedics per documentation by Orthopedic consultation 7/12/2024: \"Keflex x 2 days switched to: Linezolid 600 mg p.o. twice daily x10 days, Vantin 200 mg p.o. twice daily x10 days. Then Vantin 200mg PO BID every day x 10days, linezolid 600mg PO BID daily x 10 days.\"   SARS-CoV-2: Has 2 documented infections. One in 12/2020 and one in " "5/2021.     Interval Events    Has started PT, which is somewhat painful.   Was able to remove her PICC line at [facility].   Was able to get the oral antibiotic prescription and started yesterday.     Exposure History   Demographics: Lived in [place].    Travel: Has spent time in [place] (stayed at a resort) and [place]. No time in [place]. No other overseas travel. In the [place] has traveled around the [place] throughout the country not spending much time in each city. [place] travel[place] For her job, was on [employer] [place] for vacation.     Prior Antibiotics  Amoxicillin: 8/26-current  Ceftriaxone: 7/12-8/25/24  Vancomycin: 7/12-7/15    History of Presenting Illness    Her recent R knee PJI history is as follows:   ~7/10/24: Develops R knee pain. Had shaking chills night before knee pain developed with a low-grade temperature of 100.4. Had vomiting and some diarrhea.   7/12/24: R knee arthrocentesis with 60 mL cloudy synovial fluid. WBC 91,760 (96% PMNs). Growth of Strep agalactiae (Group B strep)  7/13/24: Underwent I&D with polyethylene liner exchange. Per op note, \"grossly evident purulent appearing fluid within knee joint\". Vancomycin powder was sprinkled in the joint. Intraoperative culture also grew Strep agalactiae.     Vaccines     Immunization History   Administered Date(s) Administered     COVID-19 12+ (2023-24) (Pfizer) 11/14/2023     COVID-19 Bivalent 18+ (Moderna) 11/07/2022     COVID-19 Monovalent 18+ (Moderna) 03/18/2021, 04/15/2021, 02/15/2022     COVID-19 Monovalent Booster 18+ (Moderna) 07/12/2022     Flu 65+ Years 10/13/2020     Hepatitis B, Adult 09/21/2018, 10/22/2018, 01/17/2019, 04/09/2019, 06/05/2019     Influenza (High Dose) 3 valent vaccine 10/21/2019     Influenza (IIV3) PF 10/19/2011     Influenza Vaccine 18-64 (Flublok) 02/01/2022, 10/06/2022     Influenza Vaccine >6 months,quad, PF 11/07/2023     Influenza Vaccine, 6+MO IM " (QUADRIVALENT W/PRESERVATIVES) 09/21/2018     Pneumococcal 20 valent Conjugate (Prevnar 20) 08/16/2022     Pneumococcal 23 valent 02/01/2005, 10/19/2011, 08/27/2020     RSV Vaccine (Abrysvo) 12/12/2023     RSV Vaccine (Arexvy) 11/01/2023, 12/12/2023     TD,PF 7+ (Tenivac) 10/02/1997     TDAP (Adacel,Boostrix) 09/21/2018     TDAP Vaccine (Boostrix) 06/23/2008     Zoster recombinant adjuvanted (SHINGRIX) 08/27/2020, 01/21/2021     Zoster vaccine, live 06/12/2016         Physical Exam   No vital signs taken, as this was a virtual visit   Gen: Alert, oriented. Talking into the camera.   HEENT: NC/AT. No scleral icterus noted. Neck supple and moving in all directions.   CV: Appears well-perfused. No cyanosis noted per video visit.   Resp: Breathing comfortably on room air. No increased work of breathing noted.   Skin: No rashes/lesions noted on face or arms in the camera    Data     Data  Laboratory data and imaging listed below was reviewed prior to this encounter.     Microbiology:    Culture   Date Value Ref Range Status   07/13/2024 No anaerobic organisms isolated  Final   07/13/2024 No Growth  Final   07/13/2024 (A)  Final    1+ Streptococcus agalactiae (Group B Streptococcus)     Comment:     Susceptibilities done on previous cultures   07/13/2024 No anaerobic organisms isolated  Final   07/13/2024 No Growth  Final   07/13/2024 (A)  Final    1+ Streptococcus agalactiae (Group B Streptococcus)     Comment:     Susceptibilities done on previous cultures   07/13/2024 No anaerobic organisms isolated  Final   07/13/2024 No Growth  Final   07/13/2024 (A)  Final    1+ Streptococcus agalactiae (Group B Streptococcus)     Comment:     Susceptibilities done on previous cultures   07/12/2024 No Growth  Final   07/12/2024 No Growth  Final   07/12/2024 (A)  Final    2+ Streptococcus agalactiae (Group B Streptococcus)   07/12/2024 No anaerobic organisms isolated  Final   11/10/2021 4+ Anaerococcus species (A)  Final     Comment:      Susceptibilities done on previous cultures   11/10/2021 2+ Peptoniphilus asaccharolyticus (A)  Final   11/10/2021 No Growth  Final   11/10/2021 No Growth  Final   11/10/2021 4+ Citrobacter koseri (A)  Final     Comment:     Susceptibilities done on previous cultures   11/10/2021 4+ Enterococcus faecalis (A)  Final     Comment:     Susceptibilities done on previous cultures   11/10/2021 4+ Streptococcus oralis (A)  Final     Comment:     Susceptibilities done on previous cultures   11/10/2021 4+ Citrobacter koseri (A)  Final     Comment:     Susceptibilities done on previous cultures   11/10/2021 Isolated in broth only Enterococcus faecalis (A)  Final     Comment:     On day 1 of incubation  Susceptibilities done on previous cultures   11/10/2021 4+ Anaerococcus species (A)  Final   11/10/2021 No Growth  Final   11/10/2021 4+ Citrobacter koseri (A)  Final   11/10/2021 4+ Enterococcus faecalis (A)  Final   11/10/2021 4+ Staphylococcus epidermidis (A)  Final   11/10/2021 3+ Streptococcus oralis (A)  Final   10/13/2020 No Growth  Final   08/05/2019 No Growth  Final   08/02/2019   Final    No Salmonella, Shigella, Yersinia, or Campylobacter.   07/31/2019 No Growth  Final   04/14/2019 Mixture of urogenital organisms  Final   05/04/2018 CITROBACTER KOSERI (A)  Final     Comment:     >100,000 col/ml Citrobacter koseri      Culture   Date Value Ref Range Status   07/13/2024 See corresponding culture for results  Final   07/13/2024 See corresponding culture for results  Final   07/13/2024 See corresponding culture for results  Final   , Inflammatory Markers:   Recent Labs   Lab Test 07/12/24  1003 12/10/20  0844 12/09/20  0637 12/08/20  0511 12/07/20  0559 12/06/20  0504 10/30/20  0649 10/28/20  1603 06/18/18  1024   SED 83*  --   --   --   --   --   --  100* 17   CRP  --  51.6* 87.0* 165.0* 210.0* 119.0* 54.7* 108.0* 8.9*   , Hematology Studies:    Recent Labs   Lab Test 07/15/24  0648 07/14/24  0645 07/13/24  0620  "07/12/24  1003 05/29/24  1030 02/20/24  1028 11/28/23  1613 04/06/21  1018 03/29/21  1040 03/27/21  0934 03/25/21  1325 01/07/21  1146 12/29/20  1108 12/11/20  0523 12/10/20  0844 12/09/20  0637   WBC 8.0  --  9.7 14.7* 5.2 4.5 6.0   < > 18.4* 4.7 5.4   < > 4.6   < > 5.7 5.9   ANEU  --   --   --   --   --   --   --   --  16.0* 3.1 3.2  --  3.0  --  4.1 3.9   AEOS  --   --   --   --   --   --   --   --  0.0 0.2 0.2  --  0.2  --  0.1 0.2   HGB 11.1* 11.6* 11.1* 12.1 13.3 13.2 13.1   < > 12.3 12.2 13.2   < > 12.0   < > 11.4* 10.6*   *  --  102* 99 100 97 101*   < > 98 97 97   < > 99   < > 99 99   *  --  89* 99* 153 148* 156   < > 155 127* 142*   < > 147*   < > 117* 106*    < > = values in this interval not displayed.    , CD4: No lab results found. and HIV RNA: No lab results found., Hepatitis B Testing:   Recent Labs   Lab Test 08/18/19  0123 06/18/18  1024 08/09/17  0819   HBCAB Nonreactive Nonreactive  --    HEPBANG  --  Nonreactive Negative   , Hepatitis C Testing:   Hepatitis C Antibody   Date Value Ref Range Status   08/18/2019 Nonreactive NR^Nonreactive Final     Comment:     Assay performance characteristics have not been established for newborns,   infants, and children     04/18/2019 Nonreactive NR^Nonreactive Final     Comment:     Assay performance characteristics have not been established for newborns,   infants, and children     , TB Quant: No lab results found.    Invalid input(s): \"[id number]\", \"[id number]\", Immune Globulin Studies:   Recent Labs   Lab Test 06/18/18  1024   IGG 1,960*      *   , HSV 1/2 IgG: No lab results found., HVS 1/2 PCR:   Recent Labs   Lab Test 09/05/19  1545   HSCSF1 Not Detected   HSCSF2 Not Detected   , VZV PCR:   Recent Labs   Lab Test 09/05/19  1549   VZRES VZV DNA Not Detected, presumed negative for Varicella zoster virus.   , and VZV IgG: No lab results found., Quantitative CMV PCR:   Recent Labs   Lab Test 10/05/19  0539   CMVQNT CMV DNA Not " Detected   CMVLOG Not Calculated    and CMV IgG:   Recent Labs   Lab Test 08/17/19  2341 06/18/18  1024   CMVIGG >8.0* >8.0*   , and Quantitative EBV PCR:   Recent Labs   Lab Test 10/05/19  0539   EBRES EBV DNA Not Detected   EBLOG Not Calculated    and EBV EA IgG: No lab results found.                  Again, thank you for allowing me to participate in the care of your patient.      Sincerely,    Andressa Harper MD

## 2024-08-28 ENCOUNTER — TELEPHONE (OUTPATIENT)
Dept: INFECTIOUS DISEASES | Facility: CLINIC | Age: 64
End: 2024-08-28
Payer: COMMERCIAL

## 2024-08-28 NOTE — TELEPHONE ENCOUNTER
Lab orders faxed as requested. Patient notified via [employer] message.      ----- Message -----   From: Andressa Harper MD   Sent: 8/27/2024   4:32 PM CDT   To: [facility] Infectious Disease Adult [facility]     Will be getting biweekly CBC with diff and CMP for the next 2 months. Can we fax these labs to Will go to [facility] in [place]?     Thanks!

## 2024-08-28 NOTE — TELEPHONE ENCOUNTER
EP called 8/28 to sched a 1 month video follow up with Dr. Harper per checkout notes from 8/27. Patient stated that she'll be in [place] for this appt.

## 2024-08-29 ENCOUNTER — TELEPHONE (OUTPATIENT)
Dept: ORTHOPEDICS | Facility: CLINIC | Age: 64
End: 2024-08-29
Payer: COMMERCIAL

## 2024-08-29 NOTE — TELEPHONE ENCOUNTER
Patient was called and informed that I checked post op notes, and it looked like x-rays were not needed. I did mention in the event that changes we would get imaging prior to her seeing Dr. Black. Patient ok with plan.     Hannah Smith CMA

## 2024-08-29 NOTE — TELEPHONE ENCOUNTER
Other: Patient is wondering if Dr. Black will want imaging done prior to post op appointment.      Could we send this information to you in [employer] or would you prefer to receive a phone call?:   Patient would like to be contacted via [employer]

## 2024-08-30 ENCOUNTER — DOCUMENTATION ONLY (OUTPATIENT)
Dept: INFECTIOUS DISEASES | Facility: CLINIC | Age: 64
End: 2024-08-30
Payer: COMMERCIAL

## 2024-08-30 NOTE — PROGRESS NOTES
Documentation of OPAT Completion    OPAT completed on: 08/25/24    Date Infusion company notified on: 8/20/24    PICC Pulled on: 8/26    Episode closed? 08/30/24     Flowsheet updated? 08/30/24

## 2024-08-31 NOTE — PROGRESS NOTES
Dr. Montes recommending transplant evaluation as well as MRI and additional labs.     Pt updated of above plan. Pt verbalized understanding and denied questions.     Evaluation orders placed and message routed to .     Message also sent to [facility] finance to confirm coverage.    0 (no pain/absence of nonverbal indicators of pain)

## 2024-09-04 DIAGNOSIS — Z98.890 POST-OPERATIVE STATE: Primary | ICD-10-CM

## 2024-09-06 ENCOUNTER — LAB (OUTPATIENT)
Dept: LAB | Facility: CLINIC | Age: 64
End: 2024-09-06
Payer: COMMERCIAL

## 2024-09-06 ENCOUNTER — ANCILLARY PROCEDURE (OUTPATIENT)
Dept: GENERAL RADIOLOGY | Facility: CLINIC | Age: 64
End: 2024-09-06
Attending: ORTHOPAEDIC SURGERY
Payer: COMMERCIAL

## 2024-09-06 ENCOUNTER — MYC MEDICAL ADVICE (OUTPATIENT)
Dept: INFECTIOUS DISEASES | Facility: CLINIC | Age: 64
End: 2024-09-06

## 2024-09-06 ENCOUNTER — OFFICE VISIT (OUTPATIENT)
Dept: ORTHOPEDICS | Facility: CLINIC | Age: 64
End: 2024-09-06
Payer: COMMERCIAL

## 2024-09-06 DIAGNOSIS — Z98.890 S/P DEBRIDEMENT: ICD-10-CM

## 2024-09-06 DIAGNOSIS — Z98.890 POST-OPERATIVE STATE: Primary | ICD-10-CM

## 2024-09-06 DIAGNOSIS — M00.261 STREPTOCOCCAL ARTHRITIS OF RIGHT KNEE (H): Primary | ICD-10-CM

## 2024-09-06 DIAGNOSIS — Z98.890 POST-OPERATIVE STATE: ICD-10-CM

## 2024-09-06 DIAGNOSIS — Z94.4 LIVER REPLACED BY TRANSPLANT (H): ICD-10-CM

## 2024-09-06 LAB
ALBUMIN SERPL BCG-MCNC: 4 G/DL (ref 3.5–5.2)
ALP SERPL-CCNC: 118 U/L (ref 40–150)
ALT SERPL W P-5'-P-CCNC: 28 U/L (ref 0–50)
ANION GAP SERPL CALCULATED.3IONS-SCNC: 9 MMOL/L (ref 7–15)
AST SERPL W P-5'-P-CCNC: 39 U/L (ref 0–45)
BASOPHILS # BLD AUTO: 0 10E3/UL (ref 0–0.2)
BASOPHILS NFR BLD AUTO: 0 %
BILIRUB DIRECT SERPL-MCNC: 0.29 MG/DL (ref 0–0.3)
BILIRUB SERPL-MCNC: 1.2 MG/DL
BUN SERPL-MCNC: 28.2 MG/DL (ref 8–23)
CALCIUM SERPL-MCNC: 9.6 MG/DL (ref 8.8–10.4)
CHLORIDE SERPL-SCNC: 106 MMOL/L (ref 98–107)
CREAT SERPL-MCNC: 0.92 MG/DL (ref 0.51–0.95)
CRP SERPL-MCNC: 10.6 MG/L
CYCLOSPORINE BLD LC/MS/MS-MCNC: 77 UG/L (ref 50–400)
EGFRCR SERPLBLD CKD-EPI 2021: 69 ML/MIN/1.73M2
EOSINOPHIL # BLD AUTO: 0.4 10E3/UL (ref 0–0.7)
EOSINOPHIL NFR BLD AUTO: 6 %
ERYTHROCYTE [DISTWIDTH] IN BLOOD BY AUTOMATED COUNT: 12.7 % (ref 10–15)
GLUCOSE SERPL-MCNC: 98 MG/DL (ref 70–99)
HCO3 SERPL-SCNC: 25 MMOL/L (ref 22–29)
HCT VFR BLD AUTO: 38.2 % (ref 35–47)
HGB BLD-MCNC: 12.5 G/DL (ref 11.7–15.7)
IMM GRANULOCYTES # BLD: 0 10E3/UL
IMM GRANULOCYTES NFR BLD: 0 %
LYMPHOCYTES # BLD AUTO: 1.2 10E3/UL (ref 0.8–5.3)
LYMPHOCYTES NFR BLD AUTO: 19 %
MCH RBC QN AUTO: 32.5 PG (ref 26.5–33)
MCHC RBC AUTO-ENTMCNC: 32.7 G/DL (ref 31.5–36.5)
MCV RBC AUTO: 99 FL (ref 78–100)
MONOCYTES # BLD AUTO: 0.7 10E3/UL (ref 0–1.3)
MONOCYTES NFR BLD AUTO: 10 %
NEUTROPHILS # BLD AUTO: 4.2 10E3/UL (ref 1.6–8.3)
NEUTROPHILS NFR BLD AUTO: 65 %
NRBC # BLD AUTO: 0 10E3/UL
NRBC BLD AUTO-RTO: 0 /100
PLATELET # BLD AUTO: 179 10E3/UL (ref 150–450)
POTASSIUM SERPL-SCNC: 4.8 MMOL/L (ref 3.4–5.3)
PROT SERPL-MCNC: 7.6 G/DL (ref 6.4–8.3)
RBC # BLD AUTO: 3.85 10E6/UL (ref 3.8–5.2)
SODIUM SERPL-SCNC: 140 MMOL/L (ref 135–145)
TME LAST DOSE: NORMAL H
TME LAST DOSE: NORMAL H
WBC # BLD AUTO: 6.5 10E3/UL (ref 4–11)

## 2024-09-06 PROCEDURE — 99000 SPECIMEN HANDLING OFFICE-LAB: CPT | Performed by: PATHOLOGY

## 2024-09-06 PROCEDURE — 85025 COMPLETE CBC W/AUTO DIFF WBC: CPT | Performed by: PATHOLOGY

## 2024-09-06 PROCEDURE — 73562 X-RAY EXAM OF KNEE 3: CPT | Mod: RT | Performed by: RADIOLOGY

## 2024-09-06 PROCEDURE — 82248 BILIRUBIN DIRECT: CPT | Performed by: PATHOLOGY

## 2024-09-06 PROCEDURE — 99024 POSTOP FOLLOW-UP VISIT: CPT | Performed by: ORTHOPAEDIC SURGERY

## 2024-09-06 PROCEDURE — 80053 COMPREHEN METABOLIC PANEL: CPT | Performed by: PATHOLOGY

## 2024-09-06 PROCEDURE — 80158 DRUG ASSAY CYCLOSPORINE: CPT | Performed by: INTERNAL MEDICINE

## 2024-09-06 PROCEDURE — 36415 COLL VENOUS BLD VENIPUNCTURE: CPT | Performed by: PATHOLOGY

## 2024-09-06 PROCEDURE — 86140 C-REACTIVE PROTEIN: CPT | Performed by: PATHOLOGY

## 2024-09-06 RX ORDER — CEFADROXIL 500 MG/1
1000 CAPSULE ORAL 2 TIMES DAILY
Qty: 360 CAPSULE | Refills: 0 | Status: SHIPPED | OUTPATIENT
Start: 2024-09-06 | End: 2024-12-05

## 2024-09-06 NOTE — LETTER
9/6/2024      Ncici Pemberton  [address] [place] [address]      Dear Colleague,    Thank you for referring your patient, Nicci Pemberton, to the [facility] ORTHOPEDIC CLIN[facility] [place]. Please see a copy of my visit note below.    Orthopaedic Surgery Clinic Follow-up Appointment    Chief Complaint:    Follow-up infected R TKA.    DOS:  10/23/2020, R TKA, Dr. Wallace.  DOS:  09/24/2021, L TKA, Dr. Wallace.  DOS:  11/10/2021, I&D superficial wound infection, L TKA, Dr. Wallace.  DOS:  07/13/2024, DAIR of right TKA infection, Dr. Black.    History of Present Illness:  This pleasant 64-year-old liver transplant patient follows up after debridement and implant retention of an infected right total knee arthroplasty.  She reports that she is still having some ongoing pain in the right knee.  She denies fevers.  She denies wound drainage.  She has been ambulating with a cane outside the house and independently inside the house.  She reports what appears to be 2 dissolvable sutures sticking out from her incision and they are bothersome.  She was recently reevaluated by Infectious Disease; Dr. Harper's note from 08/27/2024 is reviewed.  She had completed 6 weeks of ceftriaxone on 08/25/2024. The plan was to continue amoxicillin 1000 mg TID with plans to complete at least 6 months of oral antibiotics, with a possible change to cefadroxil 1000 mg BID after this month of antibiotics was completed, as well as to decide with orthopaedics for suppressive therapy afterwards (favoring at least 12 months of suppressive antibiotics).    Examination:  Examination today shows the patient to be a pleasant, cooperative, awake, and alert adult sitting upright in a regular chair in no acute distress.  She is accompanied by her spouse.   She has a single prong cane with her. Breathing pattern is regular and nonlabored on room air.  The right knee scar is well-healed and dry except for 2 pinpoint openings with a small protrusion of  what appears to be a subcutaneous absorbable suture.  These were carefully sterilely prepped, cut short flush or recessed to skin level, and then dressed with sterile dressings.  She demonstrates the ability to perform a right straight leg raise without a lag, and with 5 out of 5 quad strength.  Range of motion is approximately 0-100.  The knee is stable to varus and valgus stress with 0 laxity at full contention, and a 1A anterior drawer and 0 posterior drawer at 90.  Tenderness to palpation is endorsed along the lateral aspect of the knee.    Imaging:  Independent review of imaging was performed including weightbearing AP and lateral radiographs and a sunrise radiograph of the right knee.  Images were discussed with and shown to the patient and her spouse, and compared with previous radiographs.  Stable appearing and well aligned right total knee arthroplasty in place without signs for obvious loosening or periprosthetic fracture.    Laboratory studies:  Culture results from surgery and from preoperative aspiration all grew out Strep agalactiae (GBS).    CRP drawn this morning in this laboratory was 10.6 (reference range <5.00).  This is significantly decreased from 297.32 before her I&D on 07/12/2024, and from approximately 250 after surgery on 07/15/2024.   I reviewed outside lab results, and she had a CRP of 0.8 drawn 11 days ago on 08/26/2024 at [facility] laboratory.  This is compared to 0.9 on 08/19/2024, 1.4 on 08/12/2024, 1.8 on 08/05/2024, 3.2 on 07/29/2024, and 11.9 on 07/23/2024.    Impression:  Clinically appearing to do well after DAIR for infected R TKA with Strep agalactiae (group B strep).    Plan:  I would recommend continuing with the current treatment plan of close serial observation and oral antibiotics.  The potential for ongoing infection and needing additional surgery (further debridement, prosthesis explantation with staged reimplantation, and even the small chance of an above knee  amputation for failure to eradicate infection) was discussed.  I would be supportive of the option of continuing long term antibiotic suppression.  I would continue to follow this closely.  Follow-up examination, radiographs, and labs (CBC with differential, ESR, and CRP) in 6 weeks.  Options were discussed and the patient and spouse would be amenable to a video appointment given the distance.  Signs and symptoms to watch out for that should prompt sooner medical tension were discussed.  All questions were answered.      Again, thank you for allowing me to participate in the care of your patient.        Sincerely,        Mina Black MD

## 2024-09-06 NOTE — NURSING NOTE
Reason for visit:   Chief Complaint   Patient presents with    Surgical Followup     DOS 7/15/2024 Irrigation and debridement right total knee arthroplasty - Right tibial insert exchange. Patient states two dissolvable sutures are sticking out of her knee. She was informed that we should try to take them out as it is bothersome.            HOOS Hip Dysfunction & Osteoarthritis Outcome Questionnaire         No data to display                 KOOS Knee Survey Assessment        9/15/2022     9:04 AM   Knee Outcome Survey ADL Scale (NARCISA Trent; ALEJANDRO Vazquez; Latrice RS; Charlie, FH; Caitlin, ASHLEY; 1998)   Do you have swelling in your knee? Sometimes   Do you feel grinding, hear clicking or any other type of noise when your knee moves? Rarely   Does your knee catch or hang up when moving? Rarely   Can you straighten your knee fully? Always   Can you bend your knee fully? Always   How severe is your knee joint stiffness after first wakening in the morning? Moderate   How severe is your knee stiffness after sitting, lying or resting LATER IN THE DAY? Moderate   How often do you experience knee pain? Daily   Twisting/pivoting on your knee Moderate   Straightening knee fully None   Bending knee fully None   Walking on flat surface Mild   Going up or down stairs Moderate   At night while in bed None   Sitting or lying None   Standing upright Mild   Descending stairs Severe   Ascending stairs Severe   Rising from sitting Moderate   Standing Mild   Bending to floor/ an object Mild   Walking on flat surface Mild   Getting in/out of car Moderate   Going shopping Moderate   Putting on socks/stockings Moderate   Rising from bed Moderate   Taking off socks/stockings Moderate   Lying in bed (turning over, maintaining knee position) Mild   Getting in/out of bath Moderate   Sitting Mild   Getting on/off toilet Mild   Heavy domestic duties (moving heavy boxes, scrubbing floors, etc) Mild   Light domestic duties (cooking, dusting,  etc) Mild   Squatting Mild   Running Severe   Jumping Severe   Twisting/pivoting on your injured knee Severe   Kneeling Extreme   How often are you aware of your knee problem? Daily   Have you modified your life style to avoid potentially damaging activities to your knee? Moderately   How much are you troubled with lack of confidence in your knee? Mildly   In general, how much difficulty do you have with your knee? Moderate   Pain Score 75   Symptoms Score 42.86   Function, Daily Living Score 58.82   Sports and Rec Score 30   Quality of Life Score 50        Past Medical History  Past Medical History:   Diagnosis Date    Anemia     Cellulitis     Cirrhosis of liver (H) 2018    cirrhosis secondary to combination of nonalcoholic fatty liver disease, iron overload, and alpha-1 antitrypsin MZ phenotype who is s/p  donor liver transplant on 19    Gastroesophageal reflux disease     Heterozygous alpha 1-antitrypsin deficiency (H)     High hepatic iron concentration determined by biopsy of liver     Incisional infection 2021    Left TKA incision    Liver cirrhosis secondary to ANGULO (H)     Liver transplanted (H) 2019    Lymphedema     Osteoarthritis     knees    Other chronic pain     Left knee    SBP (spontaneous bacterial peritonitis) (H) 2019 in [place]    Thrombocytopenia (H24) 2020    Thyroid disease     Hypothyroid     Past Surgical History:   Procedure Laterality Date    ARTHROPLASTY KNEE Right 10/23/2020    Procedure: Right  total knee arthroplasty;  Surgeon: Mario Wallace MD;  [facility]    ARTHROPLASTY KNEE Left 2021    Procedure: Left total knee arthroplasty;  Surgeon: Mario Wallace MD;  [facility]    BENCH LIVER N/A 2019    Procedure: BACKBENCH PREPARATION, LIVER;  Surgeon: Mandeep Alford MD;  [facility]    BIOPSY  Bone marrow 2018    Liver ~ 2021    CHOLECYSTECTOMY  2019    Removed during liver transplant     COLONOSCOPY N/A 06/22/2018    Procedure: COLONOSCOPY;  colonoscopy;  Surgeon: Hugo Patel MD;  [facility]    ENDOSCOPIC RETROGRADE CHOLANGIOPANCREATOGRAM N/A 02/10/2020    Procedure: ENDOSCOPIC RETROGRADE CHOLANGIOPANCREATOGRAPHY with spinchterotomy;  Surgeon: Guru Rigoberto Canada MD;  [facility]    ENDOSCOPIC RETROGRADE CHOLANGIOPANCREATOGRAM N/A 05/13/2020    Procedure: ENDOSCOPIC RETROGRADE CHOLANGIOPANCREATOGRAPHY,Stent exchange and sludge removal;  Surgeon: Guru Rigoberto Canada MD;  [facility]    ENDOSCOPIC RETROGRADE CHOLANGIOPANCREATOGRAM N/A 02/24/2021    Procedure: ENDOSCOPIC RETROGRADE CHOLANGIOPANCREATOGRAPHY, SPINCTEROTOMY, DEBRIDE REMOVAL, STENT PLACEMENT;  Surgeon: Guru Rigoberto Canada MD;  [facility]    ENDOSCOPIC RETROGRADE CHOLANGIOPANCREATOGRAPHY, EXCHANGE TUBE/STENT N/A 03/04/2020    Procedure: ENDOSCOPIC RETROGRADE CHOLANGIOPANCREATOGRAPHY, WITH biliary dilarion, stent exchange, stone removal;  Surgeon: Guru Rigoberto Canada MD;  [facility]    ENDOSCOPIC RETROGRADE CHOLANGIOPANCREATOGRAPHY, EXCHANGE TUBE/STENT N/A 05/12/2021    Procedure: ENDOSCOPIC RETROGRADE CHOLANGIOPANCREATOGRAPHY WITH BILIARY STENT EXCHANGE, DILATION AND SLUDGE/STONE REMOVAL;  Surgeon: Guru Rigoberto Canada MD;  [facility]    ESOPHAGOSCOPY, GASTROSCOPY, DUODENOSCOPY (EGD), COMBINED N/A 10/31/2019    Procedure: Esophagogastroduodenoscopy, With Biopsy;  Surgeon: Nerissa Aranda MD;  [facility]    EXCHANGE POLY COMPONENT ARTHROPLASTY KNEE Right 7/13/2024    Procedure: Right tibial insert exchange;  Surgeon: Mina Black MD;  [facility]    IR FEEDING TUBE PLACEMENT W FLUORO/MD  09/06/2019    IR FLUORO 0-1 HOUR  09/05/2019    IR LIVER BIOPSY PERCUTANEOUS  03/25/2021    IR LUMBAR PUNCTURE  09/05/2019    IRRIGATION AND DEBRIDEMENT LOWER EXTREMITY, COMBINED Left 11/10/2021    Procedure:  Irrigation and debridement Left knee skin subcutaneous tissue (length: 10cm), Application of wound vac;  Surgeon: Mario Wallace MD;  [facility]    IRRIGATION AND DEBRIDEMENT LOWER EXTREMITY, COMBINED Right 2024    Procedure: Irrigation and debridement right total knee arthroplasty;  Surgeon: Mina Black MD;  [facility]    KNEE SURGERY  10/23/2020    PA STOMACH SURGERY PROCEDURE UNLISTED  Liver transplant 19    TRANSPLANT LIVER RECIPIENT  DONOR N/A 2019    Procedure: TRANSPLANT, LIVER, RECIPIENT,  DONOR;  Surgeon: Mandeep Alford MD;  [facility]    US PARACENTESIS  2019    US PARACENTESIS WITH ALBUMIN  2019     acetaminophen (TYLENOL) 325 MG tablet  amoxicillin (AMOXIL) 500 MG capsule  cefadroxil (DURICEF) 500 MG capsule  COMPRESSION STOCKINGS  cycloSPORINE modified (GENERIC EQUIVALENT) 25 MG capsule  famotidine (PEPCID) 20 MG tablet  levothyroxine (SYNTHROID/LEVOTHROID) 112 MCG tablet  melatonin 5 MG tablet  rosuvastatin (CRESTOR) 5 MG tablet      Allergies   Allergen Reactions    Ciprofloxacin Dizziness and Nausea     Had significant vertigo when she was taking ciprofloxacin, cefpodoxime, and linezolid for L knee arthroplasty skin infection in 2021. Vertigo stopped after discontinuing all antibiotics. Of these antibiotics, ciprofloxacin is most associated with vertigo.       Furosemide Rash and Swelling     Other reaction(s): rash  14  Other reaction(s): rash  14    Cefpodoxime Dizziness and Nausea     Had significant vertigo when she was taking ciprofloxacin, cefpodoxime, and linezolid for L knee arthroplasty skin infection in 2021. Vertigo stopped after discontinuing all antibiotics. Tolerated ceftriaxone without issue.        Food      Fruits with cores and pits  Apples    Linezolid Dizziness and Nausea     Had significant vertigo when she was taking ciprofloxacin, cefpodoxime, and linezolid for L knee arthroplasty skin  infection in 2021. Vertigo stopped after discontinuing all antibiotics. Of these antibiotics, ciprofloxacin is most associated with vertigo. She is willing to try these in the future if needed.       Sulfamethoxazole-Trimethoprim      Had wrist swelling that resolved with discontinuation that occurred many years ago.      Family History  Family History   Problem Relation Age of Onset    Diabetes Maternal Grandfather         Diagnosed at age 83    Mental Illness Maternal Grandfather         Alzheimers    Hypertension Mother     Osteoporosis Mother     Restless Leg Syndrome Father     Mental Illness Father         Arnie Body Disease    Mental Illness Sister         Borderline Schizophrenia    Alcoholism Brother     Diabetes Brother         Diagnosed at age 63    Hyperlipidemia Brother     Obesity Brother     No Known Problems Son     Heart Failure Paternal Grandmother     No Known Problems Son     No Known Problems Maternal Grandmother     Breast Cancer Paternal Aunt     Breast Cancer Other         Maternal Aunt (diagnosed age 50+)    Breast Cancer Other         Paternal Aunt (diagnosed age 50+)    Cirrhosis No family hx of     Liver Cancer No family hx of      Social History   Social History     Tobacco Use    Smoking status: Former     Current packs/day: 0.00     Average packs/day: 0.5 packs/day for 11.4 years (5.7 ttl pk-yrs)     Types: Cigarettes     Start date: 2000     Quit date: 10/3/2011     Years since quittin.9     Passive exposure: Past (per pt)    Smokeless tobacco: Never   Substance Use Topics    Alcohol use: Not Currently     Comment: Former weekend social drinker;  no use since     Drug use: Not Currently     Types: Marijuana     Comment: Teen years

## 2024-09-06 NOTE — PROGRESS NOTES
Orthopaedic Surgery Clinic Follow-up Appointment    Chief Complaint:    Follow-up infected R TKA.    DOS:  10/23/2020, R TKA, Dr. Wallace.  DOS:  09/24/2021, L TKA, Dr. Wallace.  DOS:  11/10/2021, I&D superficial wound infection, L TKA, Dr. Wallace.  DOS:  07/13/2024, DAIR of right TKA infection, Dr. Black.    History of Present Illness:  This pleasant 64-year-old liver transplant patient follows up after debridement and implant retention of an infected right total knee arthroplasty.  She reports that she is still having some ongoing pain in the right knee.  She denies fevers.  She denies wound drainage.  She has been ambulating with a cane outside the house and independently inside the house.  She reports what appears to be 2 dissolvable sutures sticking out from her incision and they are bothersome.  She was recently reevaluated by Infectious Disease; Dr. Harper's note from 08/27/2024 is reviewed.  She had completed 6 weeks of ceftriaxone on 08/25/2024. The plan was to continue amoxicillin 1000 mg TID with plans to complete at least 6 months of oral antibiotics, with a possible change to cefadroxil 1000 mg BID after this month of antibiotics was completed, as well as to decide with orthopaedics for suppressive therapy afterwards (favoring at least 12 months of suppressive antibiotics).    Examination:  Examination today shows the patient to be a pleasant, cooperative, awake, and alert adult sitting upright in a regular chair in no acute distress.  She is accompanied by her spouse.   She has a single prong cane with her. Breathing pattern is regular and nonlabored on room air.  The right knee scar is well-healed and dry except for 2 pinpoint openings with a small protrusion of what appears to be a subcutaneous absorbable suture.  These were carefully sterilely prepped, cut short flush or recessed to skin level, and then dressed with sterile dressings.  She demonstrates the ability to perform a right straight leg raise  without a lag, and with 5 out of 5 quad strength.  Range of motion is approximately 0-100.  The knee is stable to varus and valgus stress with 0 laxity at full contention, and a 1A anterior drawer and 0 posterior drawer at 90.  Tenderness to palpation is endorsed along the lateral aspect of the knee.    Imaging:  Independent review of imaging was performed including weightbearing AP and lateral radiographs and a sunrise radiograph of the right knee.  Images were discussed with and shown to the patient and her spouse, and compared with previous radiographs.  Stable appearing and well aligned right total knee arthroplasty in place without signs for obvious loosening or periprosthetic fracture.    Laboratory studies:  Culture results from surgery and from preoperative aspiration all grew out Strep agalactiae (GBS).    CRP drawn this morning in this laboratory was 10.6 (reference range <5.00).  This is significantly decreased from 297.32 before her I&D on 07/12/2024, and from approximately 250 after surgery on 07/15/2024.   I reviewed outside lab results, and she had a CRP of 0.8 drawn 11 days ago on 08/26/2024 at [facility] laboratory.  This is compared to 0.9 on 08/19/2024, 1.4 on 08/12/2024, 1.8 on 08/05/2024, 3.2 on 07/29/2024, and 11.9 on 07/23/2024.    Impression:  Clinically appearing to do well after DAIR for infected R TKA with Strep agalactiae (group B strep).    Plan:  I would recommend continuing with the current treatment plan of close serial observation and oral antibiotics.  The potential for ongoing infection and needing additional surgery (further debridement, prosthesis explantation with staged reimplantation, and even the small chance of an above knee amputation for failure to eradicate infection) was discussed.  I would be supportive of the option of continuing long term antibiotic suppression.  I would continue to follow this closely.  Follow-up examination, radiographs, and labs (CBC with  differential, ESR, and CRP) in 6 weeks.  Options were discussed and the patient and spouse would be amenable to a video appointment given the distance.  Signs and symptoms to watch out for that should prompt sooner medical tension were discussed.  All questions were answered.

## 2024-09-07 LAB
ACID FAST STAIN (ARUP): NORMAL

## 2024-09-10 ENCOUNTER — TELEPHONE (OUTPATIENT)
Dept: INFECTIOUS DISEASES | Facility: CLINIC | Age: 64
End: 2024-09-10

## 2024-09-10 NOTE — TELEPHONE ENCOUNTER
[facility] Call Center    Phone Message    May a detailed message be left on voicemail: yes     Reason for Call: Other: Pt is calling to speak to a nurse for clarification of the labs that Dr Harper ordered. Pt would like a nurse to call her back at ph: [phone].    Action Taken: Message routed to:  [facility] ([facility] ID    Travel Screening: Not Applicable     Date of Service: 9/10

## 2024-09-11 NOTE — TELEPHONE ENCOUNTER
"Returned call to patient. She is wanting clarification on frequency of lab draws.   Per Dr. Harper's OV note from 08/26 Dr. Harper writes, \"Bimonthly CBC with differential and BMP for the first 2 months then monthly thereafter.\" In the lab orders, the instructions are biweekly. Discussed with patient that we believe Dr. Harper wrote bimonthly in her note intending for the labs to be drawn twice a month, or biweekly due to the context of the lab orders. Patient will plan to have labs drawn biweekly unless informed otherwise. Discussed Dr. Black's request for labs in 6 weeks and helped patient understand her lab schedule. No further questions. Will route to provider for clarification.       "

## 2024-09-12 ENCOUNTER — TELEPHONE (OUTPATIENT)
Dept: INFECTIOUS DISEASES | Facility: CLINIC | Age: 64
End: 2024-09-12
Payer: COMMERCIAL

## 2024-09-17 ENCOUNTER — TELEPHONE (OUTPATIENT)
Dept: INFECTIOUS DISEASES | Facility: CLINIC | Age: 64
End: 2024-09-17
Payer: COMMERCIAL

## 2024-09-17 NOTE — TELEPHONE ENCOUNTER
[facility] Call Center    Phone Message    May a detailed message be left on voicemail: yes     Reason for Call: Other: Labs    Patient is requesting a call back from the care team (Eder, if available - per pt). States she has some questions she would like to have clarified about an upcoming lab draw. Please follow-up. Thank you.     Action Taken: Other: ID    Travel Screening: Not Applicable     Date of Service: 9/17/24

## 2024-09-17 NOTE — TELEPHONE ENCOUNTER
Returned call to patient. Wondering if a Dr. Leija works with [facility] ID as she had some lab orders under that name at [facility]. Clarified Dr. Leija is not part of our team. Nicci will call the lab to inquire further.

## 2024-09-25 ENCOUNTER — ANCILLARY PROCEDURE (OUTPATIENT)
Dept: MAMMOGRAPHY | Facility: CLINIC | Age: 64
End: 2024-09-25
Attending: FAMILY MEDICINE
Payer: COMMERCIAL

## 2024-09-25 ENCOUNTER — OFFICE VISIT (OUTPATIENT)
Dept: DERMATOLOGY | Facility: CLINIC | Age: 64
End: 2024-09-25
Payer: COMMERCIAL

## 2024-09-25 DIAGNOSIS — L82.1 SEBORRHEIC KERATOSIS: ICD-10-CM

## 2024-09-25 DIAGNOSIS — D22.9 MULTIPLE BENIGN NEVI: Primary | ICD-10-CM

## 2024-09-25 DIAGNOSIS — D18.01 CHERRY ANGIOMA: ICD-10-CM

## 2024-09-25 DIAGNOSIS — Z12.31 VISIT FOR SCREENING MAMMOGRAM: ICD-10-CM

## 2024-09-25 DIAGNOSIS — Z94.4: ICD-10-CM

## 2024-09-25 DIAGNOSIS — L81.4 LENTIGINES: ICD-10-CM

## 2024-09-25 PROCEDURE — 99213 OFFICE O/P EST LOW 20 MIN: CPT | Performed by: DERMATOLOGY

## 2024-09-25 PROCEDURE — 77063 BREAST TOMOSYNTHESIS BI: CPT | Mod: GC

## 2024-09-25 PROCEDURE — 77067 SCR MAMMO BI INCL CAD: CPT | Mod: GC

## 2024-09-25 ASSESSMENT — PAIN SCALES - GENERAL: PAINLEVEL: NO PAIN (0)

## 2024-09-25 NOTE — NURSING NOTE
Dermatology Rooming Note    Nicci Pemberton's goals for this visit include:   Chief Complaint   Patient presents with    Skin Check     Here today for a skin check. Multiple concerns.      Alethea Reeves RN

## 2024-09-25 NOTE — LETTER
9/25/2024       RE: Nicci Pemberton  [address] [place] [address]     Dear Colleague,    Thank you for referring your patient, Nicci Pemberton, to the [facility] [place] at [facility]. Please see a copy of my visit note below.    [facility] Dermatology Note  Encounter Date: Sep 25, 2024  Office Visit     Dermatology Problem List:  1. History of liver transplant 8/2019 2/2 A1AT.  2. Intradermal melanocytic nevus, central lower back, s/p 09/12/23  3. Benign bx:  - Nevus lipomatosus superficialis, right medial buttock, s/p bx 09/12/23  - Lentiginous junctional melanocytic nevus, right knee, s/p bx 9/6/2022  - Lentiginous junctional melanocytic nevus, left upper chest, s/p bx 9/6/2022    ____________________________________________    Assessment & Plan:    # Lesions to monitor:  - R lower lip, suspect labial melanotic macule. Overall unchanged from prior. Continue to monitor    # Hyperkeratosis  - can try urea 20 or 40% +/0 with sal acid nightly    # Benign lesions - SKs, cherry angiomas, lentigenes.  - No treatment required     # Multiple benign nevi.   - Monitor for ABCDEs of melanoma   - Continue sun protection - recommend SPF 30 or higher with frequent application   - Return sooner if noticing changing or symptomatic lesions     # History of liver transplant 8/2019. Patient is aware that she is at higher risk for cutaneous malignancy given history of transplant. Monitor for changing or symptomatic lesions. Continue annual skin checks and photoprotection.    Procedures Performed:   None.    Follow-up: 1 year(s) in-person, or earlier for new or changing lesions    Staff:    Marilynn Thomas MD    Department of Dermatology  [facility]: Phone: [phone], Fax:  [phone]  9/25/2024      ____________________________________________    CC: Skin Check    HPI:  Ms. Nicci Pemberton is a(n) 64 year old female who presents today as a return patient for above.    Going good - no painful, sore spots  Spot on lip seems the same    Patient is otherwise feeling well, without additional skin concerns.    Labs Reviewed:  N/A    Physical Exam:  Vitals: There were no vitals taken for this visit.  SKIN: Full body skin exam excluding the genitals was performed including face, scalp, neck, ears, chest, back, bilateral arms, hands, bilateral legs, feet, and buttocks.   - 4 mm even light brown macule right lower lip. Overall unchanged from prior.   - There are dome shaped bright red papules on the trunk and extremities .   - Multiple regular brown pigmented macules and papules are identified on the trunk and extremities. .   - Scattered brown macules on sun exposed areas.  - Waxy stuck on papules and plaques on trunk and extremities.   - hyperkeratosis on plantar feet  - No other lesions of concern on areas examined.     Medications:  Current Outpatient Medications   Medication Sig Dispense Refill     acetaminophen (TYLENOL) 325 MG tablet Take 2 tablets (650 mg) by mouth every 4 hours as needed for other 60 tablet 0     amoxicillin (AMOXIL) 500 MG capsule Take 2 capsules (1,000 mg) by mouth 3 times daily. 180 capsule 1     cefadroxil (DURICEF) 500 MG capsule Take 2 capsules (1,000 mg) by mouth 2 times daily. 360 capsule 0     cefadroxil (DURICEF) 500 MG capsule Take 2 capsules (1,000 mg) by mouth 2 times daily. 360 capsule 0     COMPRESSION STOCKINGS 2 each every 12 hours 2 Product 1     cycloSPORINE modified (GENERIC EQUIVALENT) 25 MG capsule Take 3 capsules (75 mg) by mouth every morning AND 2 capsules (50 mg) every evening. 450 capsule 3     famotidine (PEPCID) 20 MG tablet Take 1 tablet (20 mg) by mouth 2 times daily (Patient taking differently: Take 20 mg by mouth 2 times daily as  needed.) 60 tablet 3     levothyroxine (SYNTHROID/LEVOTHROID) 112 MCG tablet Take 112.5 mcg by mouth every morning       melatonin 5 MG tablet Take 1 tablet (5 mg) by mouth nightly as needed for sleep 30 tablet 0     rosuvastatin (CRESTOR) 5 MG tablet Take 1 tablet (5 mg) by mouth daily 90 tablet 0     No current facility-administered medications for this visit.      Past Medical History:   Patient Active Problem List   Diagnosis     Heterozygous alpha 1-antitrypsin deficiency (H)     Iron overload     Status post liver transplantation (H)     C. difficile diarrhea     Steroid-induced hyperglycemia     Immunosuppressed status (H24)     History of liver recipient (H)     Vertigo     Otitis media     Bile duct stricture (H28)     Common bile duct stenosis (H28)     Abnormal liver function     Acquired lymphedema of leg     Chronic pain of both knees     Venous hypertension of lower extremity, bilateral     Cramp in lower extremity associated with sleep     Obesity with serious comorbidity     Edema     Hypertension, unspecified type     Gastroesophageal reflux disease without esophagitis     Hyponatremia     Hypothyroidism     Leg swelling     Lymphedema     Macrocytosis without anemia     Malaise     Fatty liver disease, nonalcoholic     Osteoarthritis of both knees, unspecified osteoarthritis type     Osteoarthritis of knee     Peripheral neuropathy     Post-menopausal bleeding     Slow transit constipation     Thrombocytopenia (H24)     Vitamin D deficiency     Zinc deficiency     Bilateral edema of lower extremity     Physical debility     Diverticulitis of colon     Suspected 2019 novel coronavirus infection     Morbid obesity (H)     S/P liver transplant (H)     Liver transplant rejection (H)     Chronic kidney disease, stage 3 (H)     Left knee pain     Status post total knee replacement     Incisional infection     Fracture of bone of left shoulder     Other hyperlipidemia     Septic arthritis (H)     Septic  arthritis of knee, right (H)     Pain and swelling of right knee     Prosthetic joint infection (H24)     Post-operative state     Past Medical History:   Diagnosis Date     Anemia      Cellulitis      Cirrhosis of liver (H) 2018    cirrhosis secondary to combination of nonalcoholic fatty liver disease, iron overload, and alpha-1 antitrypsin MZ phenotype who is s/p  donor liver transplant on 19     Gastroesophageal reflux disease      Heterozygous alpha 1-antitrypsin deficiency (H)      High hepatic iron concentration determined by biopsy of liver      Incisional infection 2021    Left TKA incision     Liver cirrhosis secondary to ANGULO (H)      Liver transplanted (H) 2019     Lymphedema      Osteoarthritis     knees     Other chronic pain     Left knee     SBP (spontaneous bacterial peritonitis) (H) 2019 in [place]     Thrombocytopenia (H24) 2020     Thyroid disease     Hypothyroid        CC No referring provider defined for this encounter. on close of this encounter.      Again, thank you for allowing me to participate in the care of your patient.      Sincerely,    Marilynn Thomas MD

## 2024-09-25 NOTE — PROGRESS NOTES
[facility] Dermatology Note  Encounter Date: Sep 25, 2024  Office Visit     Dermatology Problem List:  1. History of liver transplant 8/2019 2/2 A1AT.  2. Intradermal melanocytic nevus, central lower back, s/p 09/12/23  3. Benign bx:  - Nevus lipomatosus superficialis, right medial buttock, s/p bx 09/12/23  - Lentiginous junctional melanocytic nevus, right knee, s/p bx 9/6/2022  - Lentiginous junctional melanocytic nevus, left upper chest, s/p bx 9/6/2022    ____________________________________________    Assessment & Plan:    # Lesions to monitor:  - R lower lip, suspect labial melanotic macule. Overall unchanged from prior. Continue to monitor    # Hyperkeratosis  - can try urea 20 or 40% +/0 with sal acid nightly    # Benign lesions - SKs, cherry angiomas, lentigenes.  - No treatment required     # Multiple benign nevi.   - Monitor for ABCDEs of melanoma   - Continue sun protection - recommend SPF 30 or higher with frequent application   - Return sooner if noticing changing or symptomatic lesions     # History of liver transplant 8/2019. Patient is aware that she is at higher risk for cutaneous malignancy given history of transplant. Monitor for changing or symptomatic lesions. Continue annual skin checks and photoprotection.    Procedures Performed:   None.    Follow-up: 1 year(s) in-person, or earlier for new or changing lesions    Staff:    Marilynn Thomas MD    Department of Dermatology  [facility]: Phone: [phone], Fax: [phone]  9/25/2024      ____________________________________________    CC: Skin Check    HPI:  Ms. Nicci Pemberton is a(n) 64 year old female who presents today as a return patient for above.    Going good - no painful, sore spots  Spot on lip seems the same    Patient is otherwise feeling well, without additional skin concerns.    Labs Reviewed:  N/A    Physical  Exam:  Vitals: There were no vitals taken for this visit.  SKIN: Full body skin exam excluding the genitals was performed including face, scalp, neck, ears, chest, back, bilateral arms, hands, bilateral legs, feet, and buttocks.   - 4 mm even light brown macule right lower lip. Overall unchanged from prior.   - There are dome shaped bright red papules on the trunk and extremities .   - Multiple regular brown pigmented macules and papules are identified on the trunk and extremities. .   - Scattered brown macules on sun exposed areas.  - Waxy stuck on papules and plaques on trunk and extremities.   - hyperkeratosis on plantar feet  - No other lesions of concern on areas examined.     Medications:  Current Outpatient Medications   Medication Sig Dispense Refill    acetaminophen (TYLENOL) 325 MG tablet Take 2 tablets (650 mg) by mouth every 4 hours as needed for other 60 tablet 0    amoxicillin (AMOXIL) 500 MG capsule Take 2 capsules (1,000 mg) by mouth 3 times daily. 180 capsule 1    cefadroxil (DURICEF) 500 MG capsule Take 2 capsules (1,000 mg) by mouth 2 times daily. 360 capsule 0    cefadroxil (DURICEF) 500 MG capsule Take 2 capsules (1,000 mg) by mouth 2 times daily. 360 capsule 0    COMPRESSION STOCKINGS 2 each every 12 hours 2 Product 1    cycloSPORINE modified (GENERIC EQUIVALENT) 25 MG capsule Take 3 capsules (75 mg) by mouth every morning AND 2 capsules (50 mg) every evening. 450 capsule 3    famotidine (PEPCID) 20 MG tablet Take 1 tablet (20 mg) by mouth 2 times daily (Patient taking differently: Take 20 mg by mouth 2 times daily as needed.) 60 tablet 3    levothyroxine (SYNTHROID/LEVOTHROID) 112 MCG tablet Take 112.5 mcg by mouth every morning      melatonin 5 MG tablet Take 1 tablet (5 mg) by mouth nightly as needed for sleep 30 tablet 0    rosuvastatin (CRESTOR) 5 MG tablet Take 1 tablet (5 mg) by mouth daily 90 tablet 0     No current facility-administered medications for this visit.      Past Medical  History:   Patient Active Problem List   Diagnosis    Heterozygous alpha 1-antitrypsin deficiency (H)    Iron overload    Status post liver transplantation (H)    C. difficile diarrhea    Steroid-induced hyperglycemia    Immunosuppressed status (H24)    History of liver recipient (H)    Vertigo    Otitis media    Bile duct stricture (H28)    Common bile duct stenosis (H28)    Abnormal liver function    Acquired lymphedema of leg    Chronic pain of both knees    Venous hypertension of lower extremity, bilateral    Cramp in lower extremity associated with sleep    Obesity with serious comorbidity    Edema    Hypertension, unspecified type    Gastroesophageal reflux disease without esophagitis    Hyponatremia    Hypothyroidism    Leg swelling    Lymphedema    Macrocytosis without anemia    Malaise    Fatty liver disease, nonalcoholic    Osteoarthritis of both knees, unspecified osteoarthritis type    Osteoarthritis of knee    Peripheral neuropathy    Post-menopausal bleeding    Slow transit constipation    Thrombocytopenia (H24)    Vitamin D deficiency    Zinc deficiency    Bilateral edema of lower extremity    Physical debility    Diverticulitis of colon    Suspected 2019 novel coronavirus infection    Morbid obesity (H)    S/P liver transplant (H)    Liver transplant rejection (H)    Chronic kidney disease, stage 3 (H)    Left knee pain    Status post total knee replacement    Incisional infection    Fracture of bone of left shoulder    Other hyperlipidemia    Septic arthritis (H)    Septic arthritis of knee, right (H)    Pain and swelling of right knee    Prosthetic joint infection (H24)    Post-operative state     Past Medical History:   Diagnosis Date    Anemia     Cellulitis     Cirrhosis of liver (H) 2018    cirrhosis secondary to combination of nonalcoholic fatty liver disease, iron overload, and alpha-1 antitrypsin MZ phenotype who is s/p  donor liver transplant on 19    Gastroesophageal  reflux disease     Heterozygous alpha 1-antitrypsin deficiency (H)     High hepatic iron concentration determined by biopsy of liver     Incisional infection 11/04/2021    Left TKA incision    Liver cirrhosis secondary to ANGULO (H)     Liver transplanted (H) 08/18/2019    Lymphedema     Osteoarthritis     knees    Other chronic pain     Left knee    SBP (spontaneous bacterial peritonitis) (H) 08/02/2019 8/1/19 in [facility]    Thrombocytopenia (H24) 11/2020    Thyroid disease     Hypothyroid        CC No referring provider defined for this encounter. on close of this encounter.

## 2024-10-01 ENCOUNTER — TELEPHONE (OUTPATIENT)
Dept: GASTROENTEROLOGY | Facility: CLINIC | Age: 64
End: 2024-10-01
Payer: COMMERCIAL

## 2024-10-01 ENCOUNTER — VIRTUAL VISIT (OUTPATIENT)
Dept: INFECTIOUS DISEASES | Facility: CLINIC | Age: 64
End: 2024-10-01
Attending: INTERNAL MEDICINE
Payer: COMMERCIAL

## 2024-10-01 VITALS — WEIGHT: 218 LBS | BODY MASS INDEX: 40.12 KG/M2 | HEIGHT: 62 IN

## 2024-10-01 DIAGNOSIS — T84.50XD INFECTION OF PROSTHETIC JOINT, SUBSEQUENT ENCOUNTER: Primary | ICD-10-CM

## 2024-10-01 DIAGNOSIS — R42 VERTIGO: ICD-10-CM

## 2024-10-01 DIAGNOSIS — M00.261 STREPTOCOCCAL ARTHRITIS OF RIGHT KNEE (H): ICD-10-CM

## 2024-10-01 DIAGNOSIS — Z94.4 STATUS POST LIVER TRANSPLANTATION (H): ICD-10-CM

## 2024-10-01 PROCEDURE — G2211 COMPLEX E/M VISIT ADD ON: HCPCS | Mod: 95 | Performed by: INTERNAL MEDICINE

## 2024-10-01 PROCEDURE — 99215 OFFICE O/P EST HI 40 MIN: CPT | Mod: 24 | Performed by: INTERNAL MEDICINE

## 2024-10-01 ASSESSMENT — PAIN SCALES - GENERAL: PAINLEVEL: MODERATE PAIN (4)

## 2024-10-01 NOTE — PATIENT INSTRUCTIONS
It was great to chat with you today. We discussed the following:     Sorry you're having vertigo! We reviewed the medical literature and noted that vertigo is not associated with cefadroxil. It is possible this is related to benign positional paroxysmal vertigo (BPPV), when the sones in your ear get displaced. You should talk with your doctor if this does not go away in the next few days. They may be able to do a maneuver called Epley Maneuver to improve this.   Continue your cefadroxil 500 mg twice daily. We will plan for 12 months of antibiotics as long as you aren't having side effects (tentative end: 7/12/2025).   Please let us know if you are having any knee pain or other symptoms.   Have your labs drawn monthly. Please send me a Intellinote message when you have this done so I can remember to review them.     Follow-up with me in clinic in ~3 months to make sure everything is going OK.

## 2024-10-01 NOTE — PROGRESS NOTES
Virtual Visit Details    Type of service:  Video Visit     Originating Location (pt. Location): Home    Distant Location (provider location):  Off-site  Platform used for Video Visit: [facility] [address] [address]  Phone: [phone]  Fax: [phone]    Patient:  iNcci Pemberton, Date of birth 1960  Date of Visit:  10/01/2024  Referring Provider Andressa Harper MD  Reason for visit: R knee PJI    Recommendations   R knee PJI  Continue cefadroxil 1000 mg BID   At minimum, would recommend completing 6 months of antibiotics (earliest end: 1/12/2025). If she's tolerating this well, would plan to go for 12 months of suppressive therapy (ideal end: 7/12/2025).   Monthly CBC with diff, CMP while on antibiotics.     Vertigo  Symptoms consistent with possible BPPV. Advised her to talk with her PCP if this does not resolve within the next few days, as they could attempt Epley maneuver.     Other  Eosinophilia has now resolved (likely 2/2 long-term CTX)  Already received Fall influenza vaccine. She will get her COVID-19 vaccine on 9/23/24.      Follow-up with me in 3 months to monitor treatment course.     I spent 42 minutes on the date of the visit reviewing the patient's chart, interpreting laboratory and imaging studies, talking with the patient, in documentation, and coordinating care.     The longitudinal plan of care for the prosthetic joint infection as documented were addressed during this visit. Due to the added complexity in care, I will continue to support Nicci in the subsequent management and with ongoing continuity of care.    Andressa Harper MD  Transplant Infectious Diseases Attending  [phone]      Assessment   Transplants:  8/18/2019 (Liver); POD  1871.  Coordinator Zina Dunham  Reason for Transplantation: ANGULO  Current Immunosuppression: Cyclosporine      Nicci Pemberton is a 65 yo woman with  history of cirrhosis 2/2 ANGULO s/p DDLT (8/2019) c/b bile duct stenosis s/p ERCP and stenting, obesity, thrombocytopenia and DJD s/p RKA (10/2020), L TKA (8/2021) c/b superficial wound infection (11/2021). She was recently admitted from 7/12-7/16 with R knee pain. Found to have  Strep agalactiae prosthetic joint infection. We are seeing her today in follow-up from her hospital stay.     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 Developed R knee pain about ~2 days prior to presentation. Unclear the exact source of infection. At this point, we are using the debridement, antibiotics, implant retention (DAIR) strategy for management with attempt of infection eradication. She did have short duration of symptoms (~3 days) prior to debridement and the fact that this is a Strep is a favorable prognosis for eradication.  However, her prosthesis was placed in 2020, which places her at higher risk for treatment failure under the DAIR strategy. Has completed 6 weeks of IV antibiotics (7/13/24-8/27/24). Transitioned to amoxicillin-->cefadroxil. Minimum antibiotic duration will be 6 months (1/12/2025). Ideally, we will complete 12 months total antibiotics if she is tolerating this (end 7/12/2025).         Mild Eosinophilia (0.6-0.7)  No risk for Strongy/schisto exposure. Likely mild reaction to long-term use of CTX. Will trend now that she is off ceftriaxone. Will not list as an allergy at this point, as we will trend now that she is off CTX to ensure no additional work-up needed.     Transplant Checklist  - QTc interval: 414 (9/2021)  - Pneumocystis prophylaxis: None  - Bacterial prophylaxis: cefadroxil for ongoing treatment of joint infection.   - Fungal prophylaxis: None  - Serostatus & viral prophylaxis: CMV D-/R+, HSV ?, EBV D+/R+ None  - Immunization status: Up-to-date on PCV-20 (2022), RSV (12/2023).  "Will need Fall COVID-19 booster (already scheduled)  - Gamma globulin status:   Recent Labs   Lab Test 06/18/18  1024   IGG 1,960*     - Antibiotic allergies:   Experienced vertigo and GI symptoms while on ciprofloxacin, cefpodoxime, and linezolid back in 11/2021. Vertigo stopped after discontinuing all antibiotics. Not true allergy. Ciprofloxacin most associated with vertigo.   Had IV infiltrate when give vancomycin in 6/2024. Not a true allergy so de-labeled.      Prior infectious diseases issues   L TKA would infection (10/2021): Underwent I&D (11/10/21). Wound cultures grew E faealis (S amp and vanc), Citrobacter koseri, MRSE, Strep oralis, Anaerococcus, Peptoniphilus asaccharolytic. Treated by orthopedics per documentation by Orthopedic consultation 7/12/2024: \"Keflex x 2 days switched to: Linezolid 600 mg p.o. twice daily x10 days, Vantin 200 mg p.o. twice daily x10 days. Then Vantin 200mg PO BID every day x 10days, linezolid 600mg PO BID daily x 10 days.\"   SARS-CoV-2: Has 2 documented infections. One in 12/2020 and one in 5/2021.     Interval Events    Since last seen by me on 8/27/2024, she has been doing well. She has been experiencing some vertigo. She does experience this intermittently when she's transitioning from lying to sitting or sitting to lying. Also when she turns her head quickly.     Started her cefadroxil on 9/27/2024. Her insurance company did cover it.     Had follow-up with Dr. Black with  ortho on 9/6/2024,where they noted that she was doing well. They also favored long-term suppressive antibiotics.       Prior Antibiotics  Cefadroxil: 9/27-current  Amoxicillin: 8/26-9/26  Ceftriaxone: 7/12-8/25/24  Vancomycin: 7/12-7/15    History of Presenting Illness    Her recent R knee PJI history is as follows:   ~7/10/24: Develops R knee pain. Had shaking chills night before knee pain developed with a low-grade temperature of 100.4. Had vomiting and some diarrhea.   7/12/24: R knee arthrocentesis " "with 60 mL cloudy synovial fluid. WBC 91,760 (96% PMNs). Growth of Strep agalactiae (Group B strep)  7/13/24: Underwent I&D with polyethylene liner exchange. Per op note, \"grossly evident purulent appearing fluid within knee joint\". Vancomycin powder was sprinkled in the joint. Intraoperative culture also grew Strep agalactiae.     Exposure History   Demographics: Lived in [place].    Travel: Has spent time in [place] (stayed at a resort) and [place]. No time in [place]. No other overseas travel. In the [place] traveled around the [place] throughout the country not spending much time in each city. [place] travel[place] For her job, [place] on [employer] vacation.     Vaccines     Immunization History   Administered Date(s) Administered    COVID-19 12+ (Pfizer) 11/14/2023    COVID-19 Bivalent 18+ (Moderna) 11/07/2022    COVID-19 Monovalent 18+ (Moderna) 03/18/2021, 04/15/2021, 02/15/2022    COVID-19 Monovalent Booster 18+ (Moderna) 07/12/2022    Flu 65+ (Fluad) 10/13/2020    Hepatitis B, Adult 09/21/2018, 10/22/2018, 01/17/2019, 04/09/2019, 06/05/2019    Influenza (High Dose) Trivalent,PF (Fluzone) 10/21/2019    Influenza (IIV3) PF 10/19/2011    Influenza Vaccine 18-64 (Flublok) 02/01/2022, 10/06/2022    Influenza Vaccine >6 months,quad, PF 11/07/2023    Influenza Vaccine, 6+MO IM (QUADRIVALENT W/PRESERVATIVES) 09/21/2018    Pneumococcal 20 valent Conjugate (Prevnar 20) 08/16/2022    Pneumococcal 23 valent 02/01/2005, 10/19/2011, 08/27/2020    RSV Vaccine (Abrysvo) 12/12/2023    RSV Vaccine (Arexvy) 11/01/2023, 12/12/2023    TD,PF 7+ (Tenivac) 10/02/1997    TDAP (Adacel,Boostrix) 09/21/2018    TDAP Vaccine (Boostrix) 06/23/2008    Zoster recombinant adjuvanted (SHINGRIX) 08/27/2020, 01/21/2021    Zoster vaccine, live 06/12/2016         Physical Exam   No vital signs taken, as this was a virtual visit   Gen: Alert, oriented. Talking into the camera and " sitting in her chair.   HEENT: NC/AT. No scleral icterus noted. Neck supple and moving in all directions.   CV: Appears well-perfused. No cyanosis noted per video visit.   Resp: Breathing comfortably on room air. No increased work of breathing noted.   Skin: No rashes/lesions noted on face or arms in the camera    Data     Data   Laboratory data and imaging listed below was reviewed prior to this encounter.     Microbiology:    Culture   Date Value Ref Range Status   07/13/2024 No anaerobic organisms isolated  Final   07/13/2024 No Growth  Final   07/13/2024 (A)  Final    1+ Streptococcus agalactiae (Group B Streptococcus)     Comment:     Susceptibilities done on previous cultures   07/13/2024 No anaerobic organisms isolated  Final   07/13/2024 No Growth  Final   07/13/2024 (A)  Final    1+ Streptococcus agalactiae (Group B Streptococcus)     Comment:     Susceptibilities done on previous cultures   07/13/2024 No anaerobic organisms isolated  Final   07/13/2024 No Growth  Final   07/13/2024 (A)  Final    1+ Streptococcus agalactiae (Group B Streptococcus)     Comment:     Susceptibilities done on previous cultures   07/12/2024 No Growth  Final   07/12/2024 No Growth  Final   07/12/2024 (A)  Final    2+ Streptococcus agalactiae (Group B Streptococcus)   07/12/2024 No anaerobic organisms isolated  Final   11/10/2021 4+ Anaerococcus species (A)  Final     Comment:     Susceptibilities done on previous cultures   11/10/2021 2+ Peptoniphilus asaccharolyticus (A)  Final   11/10/2021 No Growth  Final   11/10/2021 No Growth  Final   11/10/2021 4+ Citrobacter koseri (A)  Final     Comment:     Susceptibilities done on previous cultures   11/10/2021 4+ Enterococcus faecalis (A)  Final     Comment:     Susceptibilities done on previous cultures   11/10/2021 4+ Streptococcus oralis (A)  Final     Comment:     Susceptibilities done on previous cultures   11/10/2021 4+ Citrobacter koseri (A)  Final     Comment:      Susceptibilities done on previous cultures   11/10/2021 Isolated in broth only Enterococcus faecalis (A)  Final     Comment:     On day 1 of incubation  Susceptibilities done on previous cultures   11/10/2021 4+ Anaerococcus species (A)  Final   11/10/2021 No Growth  Final   11/10/2021 4+ Citrobacter koseri (A)  Final   11/10/2021 4+ Enterococcus faecalis (A)  Final   11/10/2021 4+ Staphylococcus epidermidis (A)  Final   11/10/2021 3+ Streptococcus oralis (A)  Final   10/13/2020 No Growth  Final   08/05/2019 No Growth  Final   08/02/2019   Final    No Salmonella, Shigella, Yersinia, or Campylobacter.   07/31/2019 No Growth  Final   04/14/2019 Mixture of urogenital organisms  Final   05/04/2018 CITROBACTER KOSERI (A)  Final     Comment:     >100,000 col/ml Citrobacter koseri     GS Culture   Date Value Ref Range Status   07/13/2024 See corresponding culture for results  Final   07/13/2024 See corresponding culture for results  Final   07/13/2024 See corresponding culture for results  Final   , Inflammatory Markers:   Recent Labs   Lab Test 07/12/24  1003 12/10/20  0844 12/09/20  0637 12/08/20  0511 12/07/20  0559 12/06/20  0504 10/30/20  0649 10/28/20  1603 06/18/18  1024   SED 83*  --   --   --   --   --   --  100* 17   CRP  --  51.6* 87.0* 165.0* 210.0* 119.0* 54.7* 108.0* 8.9*   , Hematology Studies:    Recent Labs   Lab Test 09/06/24  1055 07/15/24  0648 07/14/24  0645 07/13/24  0620 07/12/24  1003 05/29/24  1030 02/20/24  1028 04/06/21  1018 03/29/21  1040 03/27/21  0934 03/25/21  1325 01/07/21  1146 12/29/20  1108 12/11/20  0523 12/10/20  0844 12/09/20  0637   WBC 6.5 8.0  --  9.7 14.7* 5.2 4.5   < > 18.4* 4.7 5.4   < > 4.6   < > 5.7 5.9   ANEU  --   --   --   --   --   --   --   --  16.0* 3.1 3.2  --  3.0  --  4.1 3.9   AEOS  --   --   --   --   --   --   --   --  0.0 0.2 0.2  --  0.2  --  0.1 0.2   HGB 12.5 11.1* 11.6* 11.1* 12.1 13.3 13.2   < > 12.3 12.2 13.2   < > 12.0   < > 11.4* 10.6*   MCV 99 102*  --   "102* 99 100 97   < > 98 97 97   < > 99   < > 99 99    129*  --  89* 99* 153 148*   < > 155 127* 142*   < > 147*   < > 117* 106*    < > = values in this interval not displayed.    , CD4: No lab results found. and HIV RNA: No lab results found., Hepatitis B Testing:   Recent Labs   Lab Test 08/18/19  0123 06/18/18  1024 08/09/17  0819   HBCAB Nonreactive Nonreactive  --    HEPBANG  --  Nonreactive Negative   , Hepatitis C Testing:   Hepatitis C Antibody   Date Value Ref Range Status   08/18/2019 Nonreactive NR^Nonreactive Final     Comment:     Assay performance characteristics have not been established for newborns,   infants, and children     04/18/2019 Nonreactive NR^Nonreactive Final     Comment:     Assay performance characteristics have not been established for newborns,   infants, and children     , TB Quant: No lab results found.    Invalid input(s): \"[id number]\", \"[id number]\", Immune Globulin Studies:   Recent Labs   Lab Test 06/18/18  1024   IGG 1,960*      *   , HSV 1/2 IgG: No lab results found., HVS 1/2 PCR:   Recent Labs   Lab Test 09/05/19  1545   HSCSF1 Not Detected   HSCSF2 Not Detected   , VZV PCR:   Recent Labs   Lab Test 09/05/19  1549   VZRES VZV DNA Not Detected, presumed negative for Varicella zoster virus.   , and VZV IgG: No lab results found., Quantitative CMV PCR:   Recent Labs   Lab Test 10/05/19  0539   CMVQNT CMV DNA Not Detected   CMVLOG Not Calculated    and CMV IgG:   Recent Labs   Lab Test 08/17/19  2341 06/18/18  1024   CMVIGG >8.0* >8.0*   , and Quantitative EBV PCR:   Recent Labs   Lab Test 10/05/19  0539   EBRES EBV DNA Not Detected   EBLOG Not Calculated    and EBV EA IgG: No lab results found.                "

## 2024-10-01 NOTE — NURSING NOTE
[facility] [address] [facility] [address]    Is the patient currently in the state of [place]? YES    Visit mode:VIDEO    If the visit is dropped, the patient can be reconnected by: VIDEO VISIT: Text to cell phone:   Telephone Information:   Mobile [phone]       Will anyone else be joining the visit? NO  (If patient encounters technical issues they should call [phone]    Are changes needed to the allergy or medication list? Yes Pt states taking Metamucil daily. Pt states not having an allergy to Fruits with cores or pits/ Apples anymore.      Are refills needed on medications prescribed by this physician? NO    Rooming Documentation:  Questionnaire(s) completed    Reason for visit: RECHGEOVANY QUILES

## 2024-10-01 NOTE — TELEPHONE ENCOUNTER
Patient confirmed scheduled appointment:     Date: 2/20/25  Time: 10:30 am  Appointment Type: Liver Post-Return TXP HEPT  Provider: Dr. Thomas Leventhal  Location: [place]  Testing/imaging: pt will schedule labs  Additional Notes: pt unable to schedule, requested I move the appt and if it won't work, she will reschedule; moved appt three days later

## 2024-10-01 NOTE — LETTER
10/1/2024       RE: Nicci Pemberton  [address] [place] [address]     Dear Colleague,    Thank you for referring your patient, Nicci Pemberton, to the [facility] [place] at [facility]. Please see a copy of my visit note below.        [facility] [place]  [address] [address]  Phone: [phone]  Fax: [phone]    Patient:  Nicci Pemberton, Date of birth 1960  Date of Visit:  10/01/2024  Referring Provider Andressa Harper MD  Reason for visit: R knee PJI    Recommendations   R knee PJI  Continue cefadroxil 1000 mg BID   At minimum, would recommend completing 6 months of antibiotics (earliest end: 1/12/2025). If she's tolerating this well, would plan to go for 12 months of suppressive therapy (ideal end: 7/12/2025).   Monthly CBC with diff, CMP while on antibiotics.     Vertigo  Symptoms consistent with possible BPPV. Advised her to talk with her PCP if this does not resolve within the next few days, as they could attempt Epley maneuver.     Other  Eosinophilia has now resolved (likely 2/2 long-term CTX)  Already received Fall influenza vaccine. She will get her COVID-19 vaccine on 9/23/24.      Follow-up with me in 3 months to monitor treatment course.     I spent 42 minutes on the date of the visit reviewing the patient's chart, interpreting laboratory and imaging studies, talking with the patient, in documentation, and coordinating care.     The longitudinal plan of care for the prosthetic joint infection as documented were addressed during this visit. Due to the added complexity in care, I will continue to support Nicci in the subsequent management and with ongoing continuity of care.    Andressa Harper MD  Transplant Infectious Diseases Attending  [phone]      Assessment   Transplants:  8/18/2019 (Liver); POD  1871.  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 She was recently admitted from 7/12-7/16 with R knee pain. Found to have  Strep agalactiae prosthetic joint infection. We are seeing her today in follow-up from her hospital stay.     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 Developed R knee pain about ~2 days prior to presentation. Unclear the exact source of infection. At this point, we are using the debridement, antibiotics, implant retention (DAIR) strategy for management with attempt of infection eradication. She did have short duration of symptoms (~3 days) prior to debridement and the fact that this is a Strep is a favorable prognosis for eradication.  However, her prosthesis was placed in 2020, which places her at higher risk for treatment failure under the DAIR strategy. Has completed 6 weeks of IV antibiotics (7/13/24-8/27/24). Transitioned to amoxicillin-->cefadroxil. Minimum antibiotic duration will be 6 months (1/12/2025). Ideally, we will complete 12 months total antibiotics if she is tolerating this (end 7/12/2025).         Mild Eosinophilia (0.6-0.7)  No risk for Strongy/schisto exposure. Likely mild reaction to long-term use of CTX. Will trend now that she is off ceftriaxone. Will not list as an allergy at this point, as we will trend now that she is off CTX to ensure no additional work-up needed.     Transplant Checklist  - QTc interval: 414 (9/2021)  - Pneumocystis prophylaxis: None  - Bacterial prophylaxis: cefadroxil for ongoing treatment of joint infection.   - Fungal prophylaxis: None  - Serostatus &  "viral prophylaxis: CMV D-/R+, HSV ?, EBV D+/R+ None  - Immunization status: Up-to-date on PCV-20 (2022), RSV (12/2023). Will need Fall COVID-19 booster (already scheduled)  - Gamma globulin status:   Recent Labs   Lab Test 06/18/18  1024   IGG 1,960*     - Antibiotic allergies:   Experienced vertigo and GI symptoms while on ciprofloxacin, cefpodoxime, and linezolid back in 11/2021. Vertigo stopped after discontinuing all antibiotics. Not true allergy. Ciprofloxacin most associated with vertigo.   Had IV infiltrate when give vancomycin in 6/2024. Not a true allergy so de-labeled.      Prior infectious diseases issues   L TKA would infection (10/2021): Underwent I&D (11/10/21). Wound cultures grew E faealis (S amp and vanc), Citrobacter koseri, MRSE, Strep oralis, Anaerococcus, Peptoniphilus asaccharolytic. Treated by orthopedics per documentation by Orthopedic consultation 7/12/2024: \"Keflex x 2 days switched to: Linezolid 600 mg p.o. twice daily x10 days, Vantin 200 mg p.o. twice daily x10 days. Then Vantin 200mg PO BID every day x 10days, linezolid 600mg PO BID daily x 10 days.\"   SARS-CoV-2: Has 2 documented infections. One in 12/2020 and one in 5/2021.     Interval Events    Since last seen by me on 8/27/2024, she has been doing well. She has been experiencing some vertigo. She does experience this intermittently when she's transitioning from lying to sitting or sitting to lying. Also when she turns her head quickly.     Started her cefadroxil on 9/27/2024. Her insurance company did cover it.     Had follow-up with Dr. Black with  ortho on 9/6/2024,where they noted that she was doing well. They also favored long-term suppressive antibiotics.       Prior Antibiotics  Cefadroxil: 9/27-current  Amoxicillin: 8/26-9/26  Ceftriaxone: 7/12-8/25/24  Vancomycin: 7/12-7/15    History of Presenting Illness    Her recent R knee PJI history is as follows:   ~7/10/24: Develops R knee pain. Had shaking chills night before knee " "pain developed with a low-grade temperature of 100.4. Had vomiting and some diarrhea.   7/12/24: R knee arthrocentesis with 60 mL cloudy synovial fluid. WBC 91,760 (96% PMNs). Growth of Strep agalactiae (Group B strep)  7/13/24: Underwent I&D with polyethylene liner exchange. Per op note, \"grossly evident purulent appearing fluid within knee joint\". Vancomycin powder was sprinkled in the joint. Intraoperative culture also grew Strep agalactiae.     Exposure History   Demographics: Lived in [place].    Travel: Has spent time in [place] (stayed at a resort) and [place]. No time in [place]. No other overseas travel. In the [place] traveled around the [place] throughout the country not spending much time in each city. [place] travel[place] For her job, was on [place].     Vaccines     Immunization History   Administered Date(s) Administered     COVID-19 12+ (Pfizer) 11/14/2023     COVID-19 Bivalent 18+ (Moderna) 11/07/2022     COVID-19 Monovalent 18+ (Moderna) 03/18/2021, 04/15/2021, 02/15/2022     COVID-19 Monovalent Booster 18+ (Moderna) 07/12/2022     Flu 65+ (Fluad) 10/13/2020     Hepatitis B, Adult 09/21/2018, 10/22/2018, 01/17/2019, 04/09/2019, 06/05/2019     Influenza (High Dose) Trivalent,PF (Fluzone) 10/21/2019     Influenza (IIV3) PF 10/19/2011     Influenza Vaccine 18-64 (Flublok) 02/01/2022, 10/06/2022     Influenza Vaccine >6 months,quad, PF 11/07/2023     Influenza Vaccine, 6+MO IM (QUADRIVALENT W/PRESERVATIVES) 09/21/2018     Pneumococcal 20 valent Conjugate (Prevnar 20) 08/16/2022     Pneumococcal 23 valent 02/01/2005, 10/19/2011, 08/27/2020     RSV Vaccine (Abrysvo) 12/12/2023     RSV Vaccine (Arexvy) 11/01/2023, 12/12/2023     TD,PF 7+ (Tenivac) 10/02/1997     TDAP (Adacel,Boostrix) 09/21/2018     TDAP Vaccine (Boostrix) 06/23/2008     Zoster recombinant adjuvanted (SHINGRIX) 08/27/2020, 01/21/2021     Zoster vaccine, live " 06/12/2016         Physical Exam   No vital signs taken, as this was a virtual visit   Gen: Alert, oriented. Talking into the camera and sitting in her chair.   HEENT: NC/AT. No scleral icterus noted. Neck supple and moving in all directions.   CV: Appears well-perfused. No cyanosis noted per video visit.   Resp: Breathing comfortably on room air. No increased work of breathing noted.   Skin: No rashes/lesions noted on face or arms in the camera    Data     Data  Laboratory data and imaging listed below was reviewed prior to this encounter.     Microbiology:    Culture   Date Value Ref Range Status   07/13/2024 No anaerobic organisms isolated  Final   07/13/2024 No Growth  Final   07/13/2024 (A)  Final    1+ Streptococcus agalactiae (Group B Streptococcus)     Comment:     Susceptibilities done on previous cultures   07/13/2024 No anaerobic organisms isolated  Final   07/13/2024 No Growth  Final   07/13/2024 (A)  Final    1+ Streptococcus agalactiae (Group B Streptococcus)     Comment:     Susceptibilities done on previous cultures   07/13/2024 No anaerobic organisms isolated  Final   07/13/2024 No Growth  Final   07/13/2024 (A)  Final    1+ Streptococcus agalactiae (Group B Streptococcus)     Comment:     Susceptibilities done on previous cultures   07/12/2024 No Growth  Final   07/12/2024 No Growth  Final   07/12/2024 (A)  Final    2+ Streptococcus agalactiae (Group B Streptococcus)   07/12/2024 No anaerobic organisms isolated  Final   11/10/2021 4+ Anaerococcus species (A)  Final     Comment:     Susceptibilities done on previous cultures   11/10/2021 2+ Peptoniphilus asaccharolyticus (A)  Final   11/10/2021 No Growth  Final   11/10/2021 No Growth  Final   11/10/2021 4+ Citrobacter koseri (A)  Final     Comment:     Susceptibilities done on previous cultures   11/10/2021 4+ Enterococcus faecalis (A)  Final     Comment:     Susceptibilities done on previous cultures   11/10/2021 4+ Streptococcus oralis (A)  Final      Comment:     Susceptibilities done on previous cultures   11/10/2021 4+ Citrobacter koseri (A)  Final     Comment:     Susceptibilities done on previous cultures   11/10/2021 Isolated in broth only Enterococcus faecalis (A)  Final     Comment:     On day 1 of incubation  Susceptibilities done on previous cultures   11/10/2021 4+ Anaerococcus species (A)  Final   11/10/2021 No Growth  Final   11/10/2021 4+ Citrobacter koseri (A)  Final   11/10/2021 4+ Enterococcus faecalis (A)  Final   11/10/2021 4+ Staphylococcus epidermidis (A)  Final   11/10/2021 3+ Streptococcus oralis (A)  Final   10/13/2020 No Growth  Final   08/05/2019 No Growth  Final   08/02/2019   Final    No Salmonella, Shigella, Yersinia, or Campylobacter.   07/31/2019 No Growth  Final   04/14/2019 Mixture of urogenital organisms  Final   05/04/2018 CITROBACTER KOSERI (A)  Final     Comment:     >100,000 col/ml Citrobacter koseri     GS Culture   Date Value Ref Range Status   07/13/2024 See corresponding culture for results  Final   07/13/2024 See corresponding culture for results  Final   07/13/2024 See corresponding culture for results  Final   , Inflammatory Markers:   Recent Labs   Lab Test 07/12/24  1003 12/10/20  0844 12/09/20  0637 12/08/20  0511 12/07/20  0559 12/06/20  0504 10/30/20  0649 10/28/20  1603 06/18/18  1024   SED 83*  --   --   --   --   --   --  100* 17   CRP  --  51.6* 87.0* 165.0* 210.0* 119.0* 54.7* 108.0* 8.9*   , Hematology Studies:    Recent Labs   Lab Test 09/06/24  1055 07/15/24  0648 07/14/24  0645 07/13/24  0620 07/12/24  1003 05/29/24  1030 02/20/24  1028 04/06/21  1018 03/29/21  1040 03/27/21  0934 03/25/21  1325 01/07/21  1146 12/29/20  1108 12/11/20  0523 12/10/20  0844 12/09/20  0637   WBC 6.5 8.0  --  9.7 14.7* 5.2 4.5   < > 18.4* 4.7 5.4   < > 4.6   < > 5.7 5.9   ANEU  --   --   --   --   --   --   --   --  16.0* 3.1 3.2  --  3.0  --  4.1 3.9   AEOS  --   --   --   --   --   --   --   --  0.0 0.2 0.2  --  0.2  --   "0.1 0.2   HGB 12.5 11.1* 11.6* 11.1* 12.1 13.3 13.2   < > 12.3 12.2 13.2   < > 12.0   < > 11.4* 10.6*   MCV 99 102*  --  102* 99 100 97   < > 98 97 97   < > 99   < > 99 99    129*  --  89* 99* 153 148*   < > 155 127* 142*   < > 147*   < > 117* 106*    < > = values in this interval not displayed.    , CD4: No lab results found. and HIV RNA: No lab results found., Hepatitis B Testing:   Recent Labs   Lab Test 08/18/19  0123 06/18/18  1024 08/09/17  0819   HBCAB Nonreactive Nonreactive  --    HEPBANG  --  Nonreactive Negative   , Hepatitis C Testing:   Hepatitis C Antibody   Date Value Ref Range Status   08/18/2019 Nonreactive NR^Nonreactive Final     Comment:     Assay performance characteristics have not been established for newborns,   infants, and children     04/18/2019 Nonreactive NR^Nonreactive Final     Comment:     Assay performance characteristics have not been established for newborns,   infants, and children     , TB Quant: No lab results found.    Invalid input(s): \"[id number]\", \"[id number]\", Immune Globulin Studies:   Recent Labs   Lab Test 06/18/18  1024   IGG 1,960*      *   , HSV 1/2 IgG: No lab results found., HVS 1/2 PCR:   Recent Labs   Lab Test 09/05/19  1545   HSCSF1 Not Detected   HSCSF2 Not Detected   , VZV PCR:   Recent Labs   Lab Test 09/05/19  1549   VZRES VZV DNA Not Detected, presumed negative for Varicella zoster virus.   , and VZV IgG: No lab results found., Quantitative CMV PCR:   Recent Labs   Lab Test 10/05/19  0539   CMVQNT CMV DNA Not Detected   CMVLOG Not Calculated    and CMV IgG:   Recent Labs   Lab Test 08/17/19  2341 06/18/18  1024   CMVIGG >8.0* >8.0*   , and Quantitative EBV PCR:   Recent Labs   Lab Test 10/05/19  0539   EBRES EBV DNA Not Detected   EBLOG Not Calculated    and EBV EA IgG: No lab results found.           {Do not delete. Used for tracking note template use:766755}     Virtual Visit Details    Type of service:  Video Visit     Originating " "Location (pt. Location): {video visit patient location:770575::\"Home\"}  {PROVIDER LOCATION On-site should be selected for visits conducted from your clinic location or adjoining [facility] hospital, academic office, or other nearby [facility] building. Off-site should be selected for all other provider locations, including home:[id number]}  Distant Location (provider location):  {virtual location provider:179946}  Platform used for Video Visit: {Virtual Visit Platforms:757524::\"[employer]\"}      Again, thank you for allowing me to participate in the care of your patient.      Sincerely,    Andressa Harper MD    "

## 2024-10-02 ENCOUNTER — TELEPHONE (OUTPATIENT)
Dept: INFECTIOUS DISEASES | Facility: CLINIC | Age: 64
End: 2024-10-02
Payer: COMMERCIAL

## 2024-10-02 NOTE — TELEPHONE ENCOUNTER
EP called 10/2 to sched a 3 month video follow up with Dr. Harper per checkout notes from 10/1. Patient stated that she'll be in [place] for this appt.

## 2024-10-17 NOTE — PLAN OF CARE
"2300-0730    Vs: VSS on Ra.   Bg: None  Labs: Liver function trending up. K+ 4.3 today.   Pain/ Nausea: c/o \"hard pressure\" on her abdomen. Team notified. Denied nausea.  Diet: 2G K+ diet, poor appetite.   LDA: NJ running Nepro @ 45cc/hr. L PIV Sl'd.   GI/: Voiding in commode. Last BM yesterday.   Skin: Old R OBED site covered with dressing. Clamshell incision approximated with sutures, slight Erthymia around site.    Mobility: A-1 with walker and gait belt.   " Consent: The risks of the medication was reviewed with the patient. Bill For Wasted Drug?: no Post-Care Instructions: I reviewed with the patient in detail post-care instructions. Patient understands to keep the injection sites clean and call the clinic if there is any redness, swelling or pain. Lot #: [id number] Medication: Influenza vaccination (CPT 80997) - Trivalent (IIV3), split virus, 0.5 ml dosage, for intramuscular use Influenza Vaccination Ndc: Flulaval Quad Medication Supplied By?: office Detail Level: Simple Expiration Date: 06/30/2025 Route: IM Vaccine, older then 18 years old Administered By (Optional): Eleanor RN-DNC Dose: 0.5 Units: cc

## 2024-10-23 ENCOUNTER — LAB (OUTPATIENT)
Dept: LAB | Facility: CLINIC | Age: 64
End: 2024-10-23
Payer: COMMERCIAL

## 2024-10-23 DIAGNOSIS — Z98.890 POST-OPERATIVE STATE: ICD-10-CM

## 2024-10-23 DIAGNOSIS — Z98.890 S/P DEBRIDEMENT: ICD-10-CM

## 2024-10-23 LAB
CRP SERPL-MCNC: 8.7 MG/L
ERYTHROCYTE [SEDIMENTATION RATE] IN BLOOD BY WESTERGREN METHOD: 32 MM/HR (ref 0–30)

## 2024-10-23 PROCEDURE — 86140 C-REACTIVE PROTEIN: CPT

## 2024-10-23 PROCEDURE — 36415 COLL VENOUS BLD VENIPUNCTURE: CPT

## 2024-10-23 PROCEDURE — 85652 RBC SED RATE AUTOMATED: CPT

## 2024-10-25 DIAGNOSIS — Z98.890 POST-OPERATIVE STATE: Primary | ICD-10-CM

## 2024-10-25 DIAGNOSIS — T84.50XA INFECTION OF PROSTHETIC JOINT (H): ICD-10-CM

## 2024-10-25 DIAGNOSIS — Z98.890 S/P DEBRIDEMENT: ICD-10-CM

## 2024-10-28 ENCOUNTER — VIRTUAL VISIT (OUTPATIENT)
Dept: ORTHOPEDICS | Facility: CLINIC | Age: 64
End: 2024-10-28
Payer: COMMERCIAL

## 2024-10-28 DIAGNOSIS — Z98.890 S/P DEBRIDEMENT: Primary | ICD-10-CM

## 2024-10-28 DIAGNOSIS — T84.50XD INFECTION OF PROSTHETIC JOINT, SUBSEQUENT ENCOUNTER: ICD-10-CM

## 2024-10-28 PROCEDURE — 99441 PR PHYSICIAN TELEPHONE EVALUATION 5-10 MIN: CPT | Mod: 93 | Performed by: ORTHOPAEDIC SURGERY

## 2024-10-28 NOTE — LETTER
10/28/2024      Nicci Pemberton  [address] [address]      Dear Colleague,    Thank you for referring your patient, Nicci Pemberton, to the [facility] [place]. Please see a copy of my visit note below.    Orthopaedic Surgery Clinic Follow-up Appointment (Telephone)    DOS:  10/23/2020, R TKARodrigo.  DOS:  09/24/2021, L TKA, Rodrigo.  DOS:  11/10/2021, I&D superficial wound infection, L TKARodrigo.  DOS:  07/13/2024, DAIR of infected R TKA, Sofia.  Cx:  Strep agalactiae (GBS).    I had the opportunity have a telephone appointment today at [phone] with this pleasant 64-year-old patient with a past medical history notable for cirrhosis secondary to ANGULO status post liver transplant 2019 and complicated by bile duct stenosis status post ERCP and stenting, obesity, thrombocytopenia, right TKA (10/23/2020), left TKA (09/24/2021) complicated by superficial wound infection status post I&D 11/10/2021.  She developed an infection of her right total knee arthroplasty, with symptoms starting about 2-3 days prior to presentation on 07/12/2024.  She underwent debridement and implant retention on 07/13/2024.  Cultures grew out strep agalactiae (group B strep).  She was admitted to hospital from 07/12 to 07/16/2024.  From chart review she recently saw infectious disease by video visit on 10/01/2024.  She had completed 6 weeks of IV antibiotics from 07/13/2024 to 08/27/2024, then transition from amoxicillin to cefadroxil.  The plan at that time was to continue cefadroxil 1000 mg p.o. twice daily, completing minimum of 6 months of antibiotics, with the earliest potential end date of 01/12/2025, with a goal for 12 months of suppressive therapy ideally ending 07/12/2025. Today she endorses occasional nausea, mild vertigo, but otherwise denies adverse events with her antibiotic regimen.  She rates her current pain as around 4/10, with a recent uptick due to increased activity, including  doing a lot of blowing leaves and raking this fall.  She denies any discernible pattern of increasing pain though.  She endorses doing PT and a home exercise program, and that the right knee flexes to 107 and extends to 0. She denies fevers, redness, drainage, or intercurrent trauma.  She reports being able to ambulate independently without a cane.  She is also wondering recent abnormal lab test values which she thought might have been her LFTs may be related to her recent change in medications from amoxicillin to cefadroxil; she reports she is getting these rechecked in the near future.  I do note that her recent LFTs drawn 09/06/2024 actually look to be within normal limits.  She also had a normal BMP including a Cr of 0.92 with the exception of an elevated BUN of 28.2  I will attempt to reach out to her liver team as well as Dr. Harper of infectious disease to see if they recommend a change in antibiotic therapy. I also reviewed and discussed with Ms. Pemberton her recent acute phase reactants showing CRP of 8.7 and ESR 32 both significantly downtrending from previous values though still not yet back down to normal.  I would recommend we continue to follow her up clinically as well as with CRP/ESR q 3 mos for now.  Follow-up 3 mos, either F2F, video, or phone.  Signs and symptoms that should prompt medical attention were discussed.  All questions this very pleasant patient had at this time were answered. Duration of call 10 minutes.      Again, thank you for allowing me to participate in the care of your patient.        Sincerely,        Mina Black MD

## 2024-10-28 NOTE — NURSING NOTE
Reason For Visit:   Chief Complaint   Patient presents with    RECHECK     6-7 weeks s/p right TKA I&D, DOS: 7/15/24.           Pain Assessment  Patient Currently in Pain: Yes  Patient's Stated Pain Goal: 4    Corina Anderson

## 2024-10-28 NOTE — PROGRESS NOTES
Orthopaedic Surgery Clinic Follow-up Appointment (Telephone)    DOS:  10/23/2020, R TKARodrigo.  DOS:  09/24/2021, L TKARodrigo.  DOS:  11/10/2021, I&D superficial wound infection, L TKARodrigo.  DOS:  07/13/2024, DAIR of infected R TKASofia.  Cx:  Strep agalactiae (GBS).    I had the opportunity have a telephone appointment today at [phone] with this pleasant 64-year-old patient with a past medical history notable for cirrhosis secondary to ANGULO status post liver transplant 2019 and complicated by bile duct stenosis status post ERCP and stenting, obesity, thrombocytopenia, right TKA (10/23/2020), left TKA (09/24/2021) complicated by superficial wound infection status post I&D 11/10/2021.  She developed an infection of her right total knee arthroplasty, with symptoms starting about 2-3 days prior to presentation on 07/12/2024.  She underwent debridement and implant retention on 07/13/2024.  Cultures grew out strep agalactiae (group B strep).  She was admitted to hospital from 07/12 to 07/16/2024.  From chart review she recently saw infectious disease by video visit on 10/01/2024.  She had completed 6 weeks of IV antibiotics from 07/13/2024 to 08/27/2024, then transition from amoxicillin to cefadroxil.  The plan at that time was to continue cefadroxil 1000 mg p.o. twice daily, completing minimum of 6 months of antibiotics, with the earliest potential end date of 01/12/2025, with a goal for 12 months of suppressive therapy ideally ending 07/12/2025. Today she endorses occasional nausea, mild vertigo, but otherwise denies adverse events with her antibiotic regimen.  She rates her current pain as around 4/10, with a recent uptick due to increased activity, including doing a lot of blowing leaves and raking this fall.  She denies any discernible pattern of increasing pain though.  She endorses doing PT and a home exercise program, and that the right knee flexes to 107 and extends to 0. She denies fevers, redness,  drainage, or intercurrent trauma.  She reports being able to ambulate independently without a cane.  She is also wondering recent abnormal lab test values which she thought might have been her LFTs may be related to her recent change in medications from amoxicillin to cefadroxil; she reports she is getting these rechecked in the near future.  I do note that her recent LFTs drawn 09/06/2024 actually look to be within normal limits.  She also had a normal BMP including a Cr of 0.92 with the exception of an elevated BUN of 28.2  I will attempt to reach out to her liver team as well as Dr. Harper of infectious disease to see if they recommend a change in antibiotic therapy. I also reviewed and discussed with Ms. Pemberton her recent acute phase reactants showing CRP of 8.7 and ESR 32 both significantly downtrending from previous values though still not yet back down to normal.  I would recommend we continue to follow her up clinically as well as with CRP/ESR q 3 mos for now.  Follow-up 3 mos, either F2F, video, or phone.  Signs and symptoms that should prompt medical attention were discussed.  All questions this very pleasant patient had at this time were answered. Duration of call 10 minutes.

## 2024-10-29 ENCOUNTER — TELEPHONE (OUTPATIENT)
Dept: TRANSPLANT | Facility: CLINIC | Age: 64
End: 2024-10-29
Payer: COMMERCIAL

## 2024-10-29 DIAGNOSIS — T84.50XD INFECTION OF PROSTHETIC JOINT, SUBSEQUENT ENCOUNTER: Primary | ICD-10-CM

## 2024-10-30 ENCOUNTER — TELEPHONE (OUTPATIENT)
Dept: TRANSPLANT | Facility: CLINIC | Age: 64
End: 2024-10-30
Payer: COMMERCIAL

## 2024-10-30 ENCOUNTER — MYC MEDICAL ADVICE (OUTPATIENT)
Dept: INFECTIOUS DISEASES | Facility: CLINIC | Age: 64
End: 2024-10-30
Payer: COMMERCIAL

## 2024-10-30 DIAGNOSIS — T84.50XD INFECTION OF PROSTHETIC JOINT, SUBSEQUENT ENCOUNTER: Primary | ICD-10-CM

## 2024-10-30 DIAGNOSIS — Z94.4 LIVER REPLACED BY TRANSPLANT (H): ICD-10-CM

## 2024-11-06 ENCOUNTER — ANCILLARY PROCEDURE (OUTPATIENT)
Dept: ULTRASOUND IMAGING | Facility: CLINIC | Age: 64
End: 2024-11-06
Attending: INTERNAL MEDICINE
Payer: COMMERCIAL

## 2024-11-06 DIAGNOSIS — Z94.4 LIVER REPLACED BY TRANSPLANT (H): ICD-10-CM

## 2024-11-06 PROCEDURE — 93975 VASCULAR STUDY: CPT | Mod: GC | Performed by: RADIOLOGY

## 2024-11-15 ENCOUNTER — TELEPHONE (OUTPATIENT)
Dept: TRANSPLANT | Facility: CLINIC | Age: 64
End: 2024-11-15
Payer: COMMERCIAL

## 2024-11-15 DIAGNOSIS — T84.50XD INFECTION OF PROSTHETIC JOINT, SUBSEQUENT ENCOUNTER: Primary | ICD-10-CM

## 2024-11-27 ENCOUNTER — TELEPHONE (OUTPATIENT)
Dept: GASTROENTEROLOGY | Facility: CLINIC | Age: 64
End: 2024-11-27
Payer: COMMERCIAL

## 2024-11-27 DIAGNOSIS — T84.50XD INFECTION OF PROSTHETIC JOINT, SUBSEQUENT ENCOUNTER: Primary | ICD-10-CM

## 2024-11-27 NOTE — TELEPHONE ENCOUNTER
Updated Nicci appointment change, new appointment with Dr. Leventhal is 3/10/2025. She will make sure she has updated labs drawn prior to this. She verbally understood and agreed.     Melissa Agrawal, [facility]  11/27/2024 2:46 PM

## 2024-12-05 ENCOUNTER — VIRTUAL VISIT (OUTPATIENT)
Dept: SURGERY | Facility: CLINIC | Age: 64
End: 2024-12-05
Payer: COMMERCIAL

## 2024-12-05 DIAGNOSIS — K76.0 FATTY LIVER DISEASE, NONALCOHOLIC: ICD-10-CM

## 2024-12-05 DIAGNOSIS — Z71.3 NUTRITIONAL COUNSELING: ICD-10-CM

## 2024-12-05 DIAGNOSIS — E66.01 OBESITY, CLASS III, BMI 40-49.9 (MORBID OBESITY) (H): Primary | ICD-10-CM

## 2024-12-05 NOTE — PROGRESS NOTES
Nicci Pemberton is a 63 year old who is being evaluated via a billable video visit.      How would you like to obtain your AVS? MyChart  If the video visit is dropped, the invitation should be resent by: Text to cell phone: [phone]  Will anyone else be joining your video visit? No -  is in the room        Medical  Weight Loss Follow-Up Diet Evaluation  Assessment:  Nicci is presenting today for a follow up weight management nutrition consultation.  This patient has had an initial appointment and was referred by Dr. Andujar for MNT as treatment for Morbid obesity.  Weight loss medication:  none .   Pt's weight is 228 lbs  Initial weight: 229 lbs  Weight change: no change         7/26/2024     1:29 PM   Changes and Difficulties   I have made the following changes to my diet since my last visit: Focusing on protein, fiber, watching sodium intake, choosing healthier snacks.   With regards to my diet, I am still struggling with: Need to decrease amount of snacking--even healthy snacking--in the evening hours.   I have made the following changes to my activity/exercise since my last visit: Working on increasing walking several times daily.   With regards to my activity/exercise, I am still struggling with: Need to make it a priority in my schedule.     BMI: There is no height or weight on file to calculate BMI.  Ideal body weight: 50.1 kg (110 lb 7.9 oz)  Adjusted ideal body weight: 71.6 kg (157 lb 14.3 oz)    Estimated RMR ([place]-St Jeor equation):   1550 kcals x 1.2 (sedentary) = 1860 kcals (for weight maintenance)  Recommended Protein Intake:  grams of protein/day  Patient Active Problem List:  Patient Active Problem List   Diagnosis    Heterozygous alpha 1-antitrypsin deficiency (H)    Iron overload    Status post liver transplantation (H)    C. difficile diarrhea    Steroid-induced hyperglycemia    Immunosuppressed status (H)    History of liver recipient (H)    Vertigo    Otitis media    Bile  duct stricture (H)    Common bile duct stenosis (H)    Abnormal liver function    Acquired lymphedema of leg    Chronic pain of both knees    Venous hypertension of lower extremity, bilateral    Cramp in lower extremity associated with sleep    Obesity with serious comorbidity    Edema    Hypertension, unspecified type    Gastroesophageal reflux disease without esophagitis    Hyponatremia    Hypothyroidism    Leg swelling    Lymphedema    Macrocytosis without anemia    Malaise    Fatty liver disease, nonalcoholic    Osteoarthritis of both knees, unspecified osteoarthritis type    Osteoarthritis of knee    Peripheral neuropathy    Post-menopausal bleeding    Slow transit constipation    Thrombocytopenia (H)    Vitamin D deficiency    Zinc deficiency    Bilateral edema of lower extremity    Physical debility    Diverticulitis of colon    Suspected 2019 novel coronavirus infection    Morbid obesity (H)    S/P liver transplant (H)    Liver transplant rejection (H)    Chronic kidney disease, stage 3 (H)    Left knee pain    Status post total knee replacement    Incisional infection    Fracture of bone of left shoulder    Other hyperlipidemia    Septic arthritis (H)    Septic arthritis of knee, right (H)    Pain and swelling of right knee    Infection of prosthetic joint (H)    Post-operative state       Progress on goals from last visit: Patient reports she is still struggling with knee pain. Is going to PT. Has her mobility back. Still working on increasing her strength. Not using a walker or cane. Still drinking a protein shake. Struggling with proper portion sizes.  -still on antibiotic.     Increase protein intake 90-120g per day (add protein supplement) for healing.  Increase walking habits once healing allows   Continue current hydration goals (~70oz/day)      Dietary Recall:  NO: apples and fruits with core and pits   Breakfast: egg omelet with ham and fresh fruit (berries, bananas) Or greek yogurt (low fat) with  berries OR magic spoon cereal   Lunch: Or deli lunch meat with cream cheese Cottage cheese with salad   Dinner: fish (salmon, tilapia, shrimp), chicken, pork with salad  Snack: protein shake, mixed nuts   Beverages:   water (60-70oz)  Gatorade Zero     Exercise: walking outside     Nutrition Diagnosis:    Morbid obesity related to excessive energy intake as evidence by patient subjective report and BMI of 40.97        Intervention:  Food and/or nutrient delivery: consistency with meals. Protein and fiber goal at each meal.   Nutrition education: discussed benefits from fiber rich foods. Set carb goal per meal  Nutrition counseling: continued support and goal setting      Monitoring/Evaluation:    Goals:  Look into taking a probiotic tablet as she is on long term antibiotic  Use smaller plates to support portion control  Keep carb serving to 25% of plate.  Wanting to monitor carb intake - calculated about 50-55g carbs per meal      Patient to follow up in <1 month(s) with bariatrician and 4.5 month(s) with C[facility]          Video-Visit Details    Type of service:  Video Visit    Video Start Time (time video started): 11:25 AM    Video End Time (time video stopped): 11:03 AM    Originating Location (pt. Location): Home      Distant Location (provider location):  Off-site    Mode of Communication:  Video Conference via [facility]    Physician has received verbal consent for a Video Visit from the patient? Yes      Anamaria Gardner RD

## 2024-12-05 NOTE — PATIENT INSTRUCTIONS
"Patient Education   Low Sodium Diet (2,000 Milligram): Care Instructions  Overview     Limiting sodium can be an important part of managing some health problems.  The most common source of sodium is salt. People get most of the salt in their diet from canned, prepared, and packaged foods. Fast food and restaurant meals also are very high in sodium. Your doctor will probably limit your sodium to less than 2,000 milligrams (mg) a day. This limit counts all the sodium in prepared and packaged foods and any salt you add to your food.  Follow-up care is a key part of your treatment and safety. Be sure to make and go to all appointments, and call your doctor if you are having problems. It's also a good idea to know your test results and keep a list of the medicines you take.  How can you care for yourself at home?  Read food labels  Read labels on cans and food packages. The labels tell you how much sodium is in each serving. Make sure that you look at the serving size. If you eat more than the serving size, you have eaten more sodium.  Food labels also tell you the Percent Daily Value for sodium. Choose products with low Percent Daily Values for sodium.  Be aware that sodium can come in forms other than salt, including monosodium glutamate (MSG), sodium citrate, and sodium bicarbonate (baking soda). MSG is often added to Asian food. When you eat out, you can sometimes ask for food without MSG or added salt.  Buy low-sodium foods  Buy foods that are labeled \"unsalted\" (no salt added), \"sodium-free\" (less than 5 mg of sodium per serving), or \"low-sodium\" (140 mg or less of sodium per serving). Foods labeled \"reduced-sodium\" and \"light sodium\" may still have too much sodium. Be sure to read the label to see how much sodium you are getting.  Buy fresh vegetables, or frozen vegetables without added sauces. Buy low-sodium versions of canned vegetables, soups, and other canned goods.  Prepare low-sodium meals  Cut back on the " "amount of salt you use in cooking. This will help you adjust to the taste. Do not add salt after cooking. One teaspoon of salt has about 2,300 mg of sodium.  Take the salt shaker off the table.  Flavor your food with garlic, lemon juice, onion, vinegar, herbs, and spices. Do not use soy sauce, lite soy sauce, steak sauce, onion salt, garlic salt, celery salt, or ketchup on your food.  Use low-sodium salad dressings, sauces, and ketchup. Or make your own salad dressings and sauces without adding salt.  Use less salt (or none) when recipes call for it. You can often use half the salt a recipe calls for without losing flavor. Other foods such as rice, pasta, and grains do not need added salt.  Rinse canned vegetables, and cook them in fresh water. This removes some--but not all--of the salt.  Avoid water that is naturally high in sodium or that has been treated with water softeners, which add sodium. If you buy bottled water, read the label and choose a sodium-free brand.  Avoid high-sodium foods  Avoid eating:  Smoked, cured, salted, and canned meat, fish, and poultry.  Ham, irving, hot dogs, and luncheon meats.  Regular, hard, and processed cheese and regular peanut butter.  Crackers with salted tops, and other salted snack foods such as pretzels, chips, and salted popcorn.  Frozen prepared meals, unless labeled low-sodium.  Canned and dried soups, broths, and bouillon, unless labeled sodium-free or low-sodium.  Canned vegetables, unless labeled sodium-free or low-sodium.  French fries, pizza, tacos, and other fast foods.  Pickles, olives, ketchup, and other condiments, especially soy sauce, unless labeled sodium-free or low-sodium.  Where can you learn more?  Go to https://www.MyRef[employer].net/patiented  Enter V843 in the search box to learn more about \"Low Sodium Diet (2,000 Milligram): Care Instructions.\"  Current as of: September 20, 2023  Content Version: 14.2    2024 [facility], [facility].   Care instructions " adapted under license by your healthcare professional. If you have questions about a medical condition or this instruction, always ask your healthcare professional. [employer] disclaims any warranty or liability for your use of this information.

## 2024-12-05 NOTE — LETTER
12/5/2024      Nicci Pemberton  [address] [place] [address]      Dear Colleague,    Thank you for referring your patient, Nicci Pemberton, to the [facility] [place]. Please see a copy of my visit note below.    Nicci Pemberton is a 63 year old who is being evaluated via a billable video visit.      How would you like to obtain your AVS? MyChart  If the video visit is dropped, the invitation should be resent by: Text to cell phone: [phone]  Will anyone else be joining your video visit? No -  is in the room        Medical  Weight Loss Follow-Up Diet Evaluation  Assessment:  Nicci is presenting today for a follow up weight management nutrition consultation.  This patient has had an initial appointment and was referred by Dr. Andujar for MNT as treatment for Morbid obesity.  Weight loss medication:  none .   Pt's weight is 228 lbs  Initial weight: 229 lbs  Weight change: no change         7/26/2024     1:29 PM   Changes and Difficulties   I have made the following changes to my diet since my last visit: Focusing on protein, fiber, watching sodium intake, choosing healthier snacks.   With regards to my diet, I am still struggling with: Need to decrease amount of snacking--even healthy snacking--in the evening hours.   I have made the following changes to my activity/exercise since my last visit: Working on increasing walking several times daily.   With regards to my activity/exercise, I am still struggling with: Need to make it a priority in my schedule.     BMI: There is no height or weight on file to calculate BMI.  Ideal body weight: 50.1 kg (110 lb 7.9 oz)  Adjusted ideal body weight: 71.6 kg (157 lb 14.3 oz)    Estimated RMR ([place]-St Jeor equation):   1550 kcals x 1.2 (sedentary) = 1860 kcals (for weight maintenance)  Recommended Protein Intake:  grams of protein/day  Patient Active Problem List:  Patient Active Problem List   Diagnosis      Heterozygous alpha 1-antitrypsin deficiency (H)     Iron overload     Status post liver transplantation (H)     C. difficile diarrhea     Steroid-induced hyperglycemia     Immunosuppressed status (H)     History of liver recipient (H)     Vertigo     Otitis media     Bile duct stricture (H)     Common bile duct stenosis (H)     Abnormal liver function     Acquired lymphedema of leg     Chronic pain of both knees     Venous hypertension of lower extremity, bilateral     Cramp in lower extremity associated with sleep     Obesity with serious comorbidity     Edema     Hypertension, unspecified type     Gastroesophageal reflux disease without esophagitis     Hyponatremia     Hypothyroidism     Leg swelling     Lymphedema     Macrocytosis without anemia     Malaise     Fatty liver disease, nonalcoholic     Osteoarthritis of both knees, unspecified osteoarthritis type     Osteoarthritis of knee     Peripheral neuropathy     Post-menopausal bleeding     Slow transit constipation     Thrombocytopenia (H)     Vitamin D deficiency     Zinc deficiency     Bilateral edema of lower extremity     Physical debility     Diverticulitis of colon     Suspected 2019 novel coronavirus infection     Morbid obesity (H)     S/P liver transplant (H)     Liver transplant rejection (H)     Chronic kidney disease, stage 3 (H)     Left knee pain     Status post total knee replacement     Incisional infection     Fracture of bone of left shoulder     Other hyperlipidemia     Septic arthritis (H)     Septic arthritis of knee, right (H)     Pain and swelling of right knee     Infection of prosthetic joint (H)     Post-operative state       Progress on goals from last visit: Patient reports she is still struggling with knee pain. Is going to PT. Has her mobility back. Still working on increasing her strength. Not using a walker or cane. Still drinking a protein shake. Struggling with proper portion sizes.  -still on antibiotic.     Increase protein  intake 90-120g per day (add protein supplement) for healing.  Increase walking habits once healing allows   Continue current hydration goals (~70oz/day)      Dietary Recall:  NO: apples and fruits with core and pits   Breakfast: egg omelet with ham and fresh fruit (berries, bananas) Or greek yogurt (low fat) with berries OR magic spoon cereal   Lunch: Or deli lunch meat with cream cheese Cottage cheese with salad   Dinner: fish (salmon, tilapia, shrimp), chicken, pork with salad  Snack: protein shake, mixed nuts   Beverages:   water (60-70oz)  Gatorade Zero     Exercise: walking outside     Nutrition Diagnosis:    Morbid obesity related to excessive energy intake as evidence by patient subjective report and BMI of 40.97        Intervention:  Food and/or nutrient delivery: consistency with meals. Protein and fiber goal at each meal.   Nutrition education: discussed benefits from fiber rich foods. Set carb goal per meal  Nutrition counseling: continued support and goal setting      Monitoring/Evaluation:    Goals:  Look into taking a probiotic tablet as she is on long term antibiotic  Use smaller plates to support portion control  Keep carb serving to 25% of plate.  Wanting to monitor carb intake - calculated about 50-55g carbs per meal      Patient to follow up in <1 month(s) with bariatrician and 4.5 month(s) with RD          Video-Visit Details    Type of service:  Video Visit    Video Start Time (time video started): 11:25 AM    Video End Time (time video stopped): 11:03 AM    Originating Location (pt. Location): Home      Distant Location (provider location):  Off-site    Mode of Communication:  Video Conference via [facility]    Physician has received verbal consent for a Video Visit from the patient? Yes      Anamaria Gardner RD             Again, thank you for allowing me to participate in the care of your patient.        Sincerely,        Anamaria Gardner RD

## 2024-12-09 PROBLEM — Z96.653 HISTORY OF TOTAL BILATERAL KNEE REPLACEMENT: Status: ACTIVE | Noted: 2024-12-09

## 2024-12-09 PROBLEM — M85.851 OTHER SPECIFIED DISORDERS OF BONE DENSITY AND STRUCTURE, RIGHT THIGH: Status: ACTIVE | Noted: 2024-12-09

## 2024-12-09 PROBLEM — K57.90 DIVERTICULOSIS OF INTESTINE, PART UNSPECIFIED, WITHOUT PERFORATION OR ABSCESS WITHOUT BLEEDING: Status: ACTIVE | Noted: 2024-12-09

## 2024-12-11 ENCOUNTER — VIRTUAL VISIT (OUTPATIENT)
Dept: SURGERY | Facility: CLINIC | Age: 64
End: 2024-12-11
Attending: EMERGENCY MEDICINE
Payer: COMMERCIAL

## 2024-12-11 VITALS — BODY MASS INDEX: 41.52 KG/M2 | WEIGHT: 227 LBS

## 2024-12-11 DIAGNOSIS — Z87.39 HX OF SEPTIC ARTHRITIS: ICD-10-CM

## 2024-12-11 DIAGNOSIS — K76.0 FATTY LIVER DISEASE, NONALCOHOLIC: ICD-10-CM

## 2024-12-11 DIAGNOSIS — E66.813 CLASS 3 SEVERE OBESITY DUE TO EXCESS CALORIES WITH SERIOUS COMORBIDITY AND BODY MASS INDEX (BMI) OF 40.0 TO 44.9 IN ADULT (H): Primary | ICD-10-CM

## 2024-12-11 DIAGNOSIS — E66.01 CLASS 3 SEVERE OBESITY DUE TO EXCESS CALORIES WITH SERIOUS COMORBIDITY AND BODY MASS INDEX (BMI) OF 40.0 TO 44.9 IN ADULT (H): Primary | ICD-10-CM

## 2024-12-11 DIAGNOSIS — Z94.4 STATUS POST LIVER TRANSPLANTATION (H): ICD-10-CM

## 2024-12-11 PROCEDURE — 99214 OFFICE O/P EST MOD 30 MIN: CPT | Mod: 95 | Performed by: EMERGENCY MEDICINE

## 2024-12-11 RX ORDER — BUPROPION HYDROCHLORIDE 75 MG/1
TABLET ORAL
Qty: 180 TABLET | Refills: 0 | Status: SHIPPED | OUTPATIENT
Start: 2024-12-11

## 2024-12-11 RX ORDER — NALTREXONE HYDROCHLORIDE 50 MG/1
TABLET, FILM COATED ORAL
Qty: 90 TABLET | Refills: 0 | Status: SHIPPED | OUTPATIENT
Start: 2024-12-11

## 2024-12-11 RX ORDER — AMOXICILLIN 500 MG/1
500 TABLET, FILM COATED ORAL 2 TIMES DAILY
COMMUNITY

## 2024-12-11 NOTE — PROGRESS NOTES
Virtual Visit Details    Type of service:  Video Visit     Originating Location (pt. Location): Home    Distant Location (provider location):  On-site  Platform used for Video Visit: [facility]    Bariatric Clinic Follow-Up Visit:    Nicci Pemberton is a 64 year old  female with Body mass index is 41.52 kg/m .  presenting here today for follow-up on non-surgical efforts for weight loss.  She'd previously worked with us briefly in 2017 but then went on to need liver transplantation.  In 2024, she'd followed up with her Gastroenterologist, Dr. Leventhal, and was urged to drop at least 7-10% total body weight to improve overall liver health.  Re-establishment of Care Intake visit occurred on 2/29/24 with a weight of 229 lbs and BMI of 41.9.  She was maintaining a 21 lb reduction from her heaviest weight in 2017 of 250 lbs but has been working her way back up in weight since dropping a lot of weight right after liver transplant (see chart above).   Along with diet and behavior changes, she had been prescribed Wegovy initially to assist her weight loss goals, but she notes she did not want to start it upon follow up correspondence on 3/12/24. Upon follow up 7/29/24, she's not interested in injectable therapies given her recent knee infection/immunosuppression risks. She was still needing oxycodone so not a good naltrexone candidate summer of 2024. Given age, would avoid high dose phentermine but could consider low dose options in the future (Lomaira).  Hospitalization in Mid July 2024 about 2 weeks ago was related to joint infection and sent home w/ prolonged IV antibiotics (Ceftriaxone).  Upon follow up 12/11/24 she has gained weight from last visit but maintaining 24 lb reduction from peak weigh tin 2023 of 251 lbs.     See her intake visit notes for details on identified contributors to weight gain in the past. Chart review shows Dietician calculated RMR of 1550kcal/day and protein intake goal of 60-80g/day. Upon follow up  with Dietician 7/23/24 to promote better healing aiming for 90-120g/day protein.    Weight:   Wt Readings from Last 5 Encounters:   12/11/24 103 kg (227 lb)   10/01/24 98.9 kg (218 lb)   08/27/24 98.9 kg (218 lb)   07/30/24 98.9 kg (218 lb)   07/29/24 98.9 kg (218 lb)    pounds      Comorbidities:  Patient Active Problem List   Diagnosis    Heterozygous alpha 1-antitrypsin deficiency (H)    Iron overload    Status post liver transplantation (H)    C. difficile diarrhea    Steroid-induced hyperglycemia    Immunosuppressed status (H)    History of liver recipient (H)    Vertigo    Otitis media    Bile duct stricture (H)    Common bile duct stenosis (H)    Abnormal liver function    Acquired lymphedema of leg    Chronic pain of both knees    Venous hypertension of lower extremity, bilateral    Cramp in lower extremity associated with sleep    Obesity with serious comorbidity    Edema    Hypertension, unspecified type    Gastroesophageal reflux disease without esophagitis    Hyponatremia    Hypothyroidism    Leg swelling    Lymphedema    Macrocytosis without anemia    Malaise    Fatty liver disease, nonalcoholic    Osteoarthritis of both knees, unspecified osteoarthritis type    Osteoarthritis of knee    Peripheral neuropathy    Post-menopausal bleeding    Slow transit constipation    Thrombocytopenia (H)    Vitamin D deficiency    Zinc deficiency    Bilateral edema of lower extremity    Physical debility    Diverticulitis of colon    Suspected 2019 novel coronavirus infection    Morbid obesity (H)    S/P liver transplant (H)    Liver transplant rejection (H)    Chronic kidney disease, stage 3 (H)    Left knee pain    Status post total knee replacement    Incisional infection    Fracture of bone of left shoulder    Other hyperlipidemia    Septic arthritis (H)    Septic arthritis of knee, right (H)    Pain and swelling of right knee    Infection of prosthetic joint (H)    Post-operative state    Diverticulosis of  "intestine, part unspecified, without perforation or abscess without bleeding    History of total bilateral knee replacement    Other specified disorders of bone density and structure, right thigh       Current Outpatient Medications:     acetaminophen (TYLENOL) 325 MG tablet, Take 2 tablets (650 mg) by mouth every 4 hours as needed for other, Disp: 60 tablet, Rfl: 0    COMPRESSION STOCKINGS, 2 each every 12 hours, Disp: 2 Product, Rfl: 1    cycloSPORINE modified (GENERIC EQUIVALENT) 25 MG capsule, Take 3 capsules (75 mg) by mouth every morning AND 2 capsules (50 mg) every evening., Disp: 450 capsule, Rfl: 3    famotidine (PEPCID) 20 MG tablet, Take 1 tablet (20 mg) by mouth 2 times daily (Patient taking differently: Take 20 mg by mouth 2 times daily as needed.), Disp: 60 tablet, Rfl: 3    levothyroxine (SYNTHROID/LEVOTHROID) 112 MCG tablet, Take 112.5 mcg by mouth every morning, Disp: , Rfl:     melatonin 5 MG tablet, Take 1 tablet (5 mg) by mouth nightly as needed for sleep, Disp: 30 tablet, Rfl: 0    rosuvastatin (CRESTOR) 5 MG tablet, Take 1 tablet (5 mg) by mouth daily, Disp: 90 tablet, Rfl: 0      Interim: Since our last visit, she has continued to struggle with knee pain/has met her ROM goals with her knee but now working on strength work, having graduated from PT now. Can do her ADLs but \"pays for it\".  She's hesitant for injectables due to some risks for bacteremia concerns after such a long johnson with septic knee replacement issues.     Plan:   1.  Diet: aiming for 1300 kcal/day with 90-100grams of lean protein daily and 80 oz of water daily for this next phase of weight loss season.   2. Exercise: chair yoga/swimming or strengthening routines to help reinforce your good range of motion of the knee and build endurance/strength back up. 10-20 minutes daily recommended at this point.     3. Medication: trial of bupropion/naltrexone: start one tablet of bupropion with breakfast and half a tablet of naltrexone " "with supper for 10 days then increase to half tablet of naltrexone and one tablet of bupropion with breakfast AND supper. Stop if upset stomach/mood changes/rash/insomnia or any other intolerance. Stop naltrexone if any need for opiate pain medication in the future as naltrexone makes such things not work.     4. Track intake with an hemanth like [employer] or [employer] this winter     5. Goals: 0.8-2 lbs/week of weight loss suggests a good routine.       We discussed [facility] Bariatric Basics including:  -eating 3 meals daily  -reviewed metabolic needs for weight loss based on Resting Metabolic Rate  -protein goals supportive of healthy weight loss  -avoiding/limiting calorie containing beverages  -We discussed the importance of restorative sleep and stress management in maintaining a healthy weight.  -We discussed the National Weight Control Registry healthy weight maintenance strategies and ways to optimize metabolism.  -We discussed the importance of physical activity including cardiovascular and strength training in maintaining a healthier weight and explored viable options.      Most recent labs:  Lab Results   Component Value Date    WBC 6.5 09/06/2024    HGB 12.5 09/06/2024    HCT 38.2 09/06/2024    MCV 99 09/06/2024     09/06/2024     Lab Results   Component Value Date    CHOL 147 05/29/2024     Lab Results   Component Value Date    HDL 51 05/29/2024     No components found for: \"LDLCALC\"  Lab Results   Component Value Date    TRIG 65 05/29/2024     No results found for: \"CHOLHDL\"  Lab Results   Component Value Date    ALT 28 09/06/2024    AST 39 09/06/2024    ALKPHOS 118 09/06/2024     No results found for: \"HGBA1C\"  Lab Results   Component Value Date    B12 897 03/12/2024     No components found for: \"VITDT1\"  Lab Results   Component Value Date    DREW 553 (H) 12/06/2020     Lab Results   Component Value Date    PTHI 14 (L) 06/18/2018     Lab Results   Component Value Date    ZN 60.5 03/12/2024     No " "results found for: \"VIB1WB\"  Lab Results   Component Value Date    TSH 0.56 04/24/2024     No results found for: \"TEST\"    DIETARY HISTORY  Increased protein awareness after her dietician visit last week.       PHYSICAL ACTIVITY PATTERNS:  Cardiovascular: ROM exercises of knee/ walks. Still sore and can limit duration of walking still.   Strength Training: yes, knee strengthening at home now.     REVIEW OF SYSTEMS  No recent illness. On long term amoxicillin currently. .  PHYSICAL EXAM:  Vitals: Wt 103 kg (227 lb)   BMI 41.52 kg/m    Weight:   Wt Readings from Last 3 Encounters:   12/11/24 103 kg (227 lb)   10/01/24 98.9 kg (218 lb)   08/27/24 98.9 kg (218 lb)         GEN: Pleasant, well groomed, in no acute distress  HEENT:  normal facies. .  NECK: No swelling.  HEART: .  LUNGS: No respiratory difficulty noted. No cough. .  ABDOMEN: .  EXTREMITIES: No tremor. seated..  NEURO: Alert and Oriented X3, fluent speech..  SKIN: No visible rashes. .    Interim study results: no.      30 minutes spent by me on the date of the encounter doing chart review, history and exam, documentation and further activities per the note   Darron Andujar MD  [facility]  8:30 AM  12/11/2024  "

## 2024-12-11 NOTE — NURSING NOTE
Is the patient currently in the state of [place]? YES    Location: home    Visit mode:VIDEO    If the visit is dropped, the patient can be reconnected by: VIDEO VISIT: Text to cell phone:   Telephone Information:   Mobile [phone]    and VIDEO VISIT: Send to e-mail at: [email or web address]    Will anyone else be joining the visit? NO  (If patient encounters technical issues they should call [phone]    Are changes needed to the allergy or medication list? No    Are refills needed on medications prescribed by this physician? NO    Reason for visit: BLAYNE Astudillo, Virtual Visit Facilitator    QNR Status: completed

## 2024-12-11 NOTE — LETTER
12/11/2024      Nicci Pemberton  [address] [place] [address]      Dear Colleague,    Thank you for referring your patient, Nicci Pemberton, to the [facility] [place]. Please see a copy of my visit note below.    Virtual Visit Details    Type of service:  Video Visit     Originating Location (pt. Location): Home    Distant Location (provider location):  On-site  Platform used for Video Visit: [facility]    Bariatric Clinic Follow-Up Visit:    Nicci Pemberton is a 64 year old  female with Body mass index is 41.52 kg/m .  presenting here today for follow-up on non-surgical efforts for weight loss.  She'd previously worked with us briefly in 2017 but then went on to need liver transplantation.  In 2024, she'd followed up with her Gastroenterologist, Dr. Leventhal, and was urged to drop at least 7-10% total body weight to improve overall liver health.  Re-establishment of Care Intake visit occurred on 2/29/24 with a weight of 229 lbs and BMI of 41.9.  She was maintaining a 21 lb reduction from her heaviest weight in 2017 of 250 lbs but has been working her way back up in weight since dropping a lot of weight right after liver transplant (see chart above).   Along with diet and behavior changes, she had been prescribed Wegovy initially to assist her weight loss goals, but she notes she did not want to start it upon follow up correspondence on 3/12/24. Upon follow up 7/29/24, she's not interested in injectable therapies given her recent knee infection/immunosuppression risks. She was still needing oxycodone so not a good naltrexone candidate summer of 2024. Given age, would avoid high dose phentermine but could consider low dose options in the future (Lomaira).  Hospitalization in Mid July 2024 about 2 weeks ago was related to joint infection and sent home w/ prolonged IV antibiotics (Ceftriaxone).  Upon follow up 12/11/24 she has gained weight from last visit but maintaining  24 lb reduction from peak weigh tin 2023 of 251 lbs.     See her intake visit notes for details on identified contributors to weight gain in the past. Chart review shows Dietician calculated RMR of 1550kcal/day and protein intake goal of 60-80g/day. Upon follow up with Dietician 7/23/24 to promote better healing aiming for 90-120g/day protein.    Weight:   Wt Readings from Last 5 Encounters:   12/11/24 103 kg (227 lb)   10/01/24 98.9 kg (218 lb)   08/27/24 98.9 kg (218 lb)   07/30/24 98.9 kg (218 lb)   07/29/24 98.9 kg (218 lb)    pounds      Comorbidities:  Patient Active Problem List   Diagnosis     Heterozygous alpha 1-antitrypsin deficiency (H)     Iron overload     Status post liver transplantation (H)     C. difficile diarrhea     Steroid-induced hyperglycemia     Immunosuppressed status (H)     History of liver recipient (H)     Vertigo     Otitis media     Bile duct stricture (H)     Common bile duct stenosis (H)     Abnormal liver function     Acquired lymphedema of leg     Chronic pain of both knees     Venous hypertension of lower extremity, bilateral     Cramp in lower extremity associated with sleep     Obesity with serious comorbidity     Edema     Hypertension, unspecified type     Gastroesophageal reflux disease without esophagitis     Hyponatremia     Hypothyroidism     Leg swelling     Lymphedema     Macrocytosis without anemia     Malaise     Fatty liver disease, nonalcoholic     Osteoarthritis of both knees, unspecified osteoarthritis type     Osteoarthritis of knee     Peripheral neuropathy     Post-menopausal bleeding     Slow transit constipation     Thrombocytopenia (H)     Vitamin D deficiency     Zinc deficiency     Bilateral edema of lower extremity     Physical debility     Diverticulitis of colon     Suspected 2019 novel coronavirus infection     Morbid obesity (H)     S/P liver transplant (H)     Liver transplant rejection (H)     Chronic kidney disease, stage 3 (H)     Left knee pain  "    Status post total knee replacement     Incisional infection     Fracture of bone of left shoulder     Other hyperlipidemia     Septic arthritis (H)     Septic arthritis of knee, right (H)     Pain and swelling of right knee     Infection of prosthetic joint (H)     Post-operative state     Diverticulosis of intestine, part unspecified, without perforation or abscess without bleeding     History of total bilateral knee replacement     Other specified disorders of bone density and structure, right thigh       Current Outpatient Medications:      acetaminophen (TYLENOL) 325 MG tablet, Take 2 tablets (650 mg) by mouth every 4 hours as needed for other, Disp: 60 tablet, Rfl: 0     COMPRESSION STOCKINGS, 2 each every 12 hours, Disp: 2 Product, Rfl: 1     cycloSPORINE modified (GENERIC EQUIVALENT) 25 MG capsule, Take 3 capsules (75 mg) by mouth every morning AND 2 capsules (50 mg) every evening., Disp: 450 capsule, Rfl: 3     famotidine (PEPCID) 20 MG tablet, Take 1 tablet (20 mg) by mouth 2 times daily (Patient taking differently: Take 20 mg by mouth 2 times daily as needed.), Disp: 60 tablet, Rfl: 3     levothyroxine (SYNTHROID/LEVOTHROID) 112 MCG tablet, Take 112.5 mcg by mouth every morning, Disp: , Rfl:      melatonin 5 MG tablet, Take 1 tablet (5 mg) by mouth nightly as needed for sleep, Disp: 30 tablet, Rfl: 0     rosuvastatin (CRESTOR) 5 MG tablet, Take 1 tablet (5 mg) by mouth daily, Disp: 90 tablet, Rfl: 0      Interim: Since our last visit, she has continued to struggle with knee pain/has met her ROM goals with her knee but now working on strength work, having graduated from PT now. Can do her ADLs but \"pays for it\".  She's hesitant for injectables due to some risks for bacteremia concerns after such a long johnson with septic knee replacement issues.     Plan:   1.  Diet: aiming for 1300 kcal/day with 90-100grams of lean protein daily and 80 oz of water daily for this next phase of weight loss season.   2. " "Exercise: chair yoga/swimming or strengthening routines to help reinforce your good range of motion of the knee and build endurance/strength back up. 10-20 minutes daily recommended at this point.     3. Medication: trial of bupropion/naltrexone: start one tablet of bupropion with breakfast and half a tablet of naltrexone with supper for 10 days then increase to half tablet of naltrexone and one tablet of bupropion with breakfast AND supper. Stop if upset stomach/mood changes/rash/insomnia or any other intolerance. Stop naltrexone if any need for opiate pain medication in the future as naltrexone makes such things not work.     4. Track intake with an hemanth like [employer] or [employer] this winter     5. Goals: 0.8-2 lbs/week of weight loss suggests a good routine.       We discussed [facility] Bariatric Basics including:  -eating 3 meals daily  -reviewed metabolic needs for weight loss based on Resting Metabolic Rate  -protein goals supportive of healthy weight loss  -avoiding/limiting calorie containing beverages  -We discussed the importance of restorative sleep and stress management in maintaining a healthy weight.  -We discussed the National Weight Control Registry healthy weight maintenance strategies and ways to optimize metabolism.  -We discussed the importance of physical activity including cardiovascular and strength training in maintaining a healthier weight and explored viable options.      Most recent labs:  Lab Results   Component Value Date    WBC 6.5 09/06/2024    HGB 12.5 09/06/2024    HCT 38.2 09/06/2024    MCV 99 09/06/2024     09/06/2024     Lab Results   Component Value Date    CHOL 147 05/29/2024     Lab Results   Component Value Date    HDL 51 05/29/2024     No components found for: \"LDLCALC\"  Lab Results   Component Value Date    TRIG 65 05/29/2024     No results found for: \"CHOLHDL\"  Lab Results   Component Value Date    ALT 28 09/06/2024    AST 39 09/06/2024    ALKPHOS 118 09/06/2024 " "    No results found for: \"HGBA1C\"  Lab Results   Component Value Date    B12 897 03/12/2024     No components found for: \"VITDT1\"  Lab Results   Component Value Date    DREW 553 (H) 12/06/2020     Lab Results   Component Value Date    PTHI 14 (L) 06/18/2018     Lab Results   Component Value Date    ZN 60.5 03/12/2024     No results found for: \"VIB1WB\"  Lab Results   Component Value Date    TSH 0.56 04/24/2024     No results found for: \"TEST\"    DIETARY HISTORY  Increased protein awareness after her dietician visit last week.       PHYSICAL ACTIVITY PATTERNS:  Cardiovascular: ROM exercises of knee/ walks. Still sore and can limit duration of walking still.   Strength Training: yes, knee strengthening at home now.     REVIEW OF SYSTEMS  No recent illness. On long term amoxicillin currently. .  PHYSICAL EXAM:  Vitals: Wt 103 kg (227 lb)   BMI 41.52 kg/m    Weight:   Wt Readings from Last 3 Encounters:   12/11/24 103 kg (227 lb)   10/01/24 98.9 kg (218 lb)   08/27/24 98.9 kg (218 lb)         GEN: Pleasant, well groomed, in no acute distress  HEENT:  normal facies. .  NECK: No swelling.  HEART: .  LUNGS: No respiratory difficulty noted. No cough. .  ABDOMEN: .  EXTREMITIES: No tremor. seated..  NEURO: Alert and Oriented X3, fluent speech..  SKIN: No visible rashes. .    Interim study results: no.      30 minutes spent by me on the date of the encounter doing chart review, history and exam, documentation and further activities per the note   Darron Andujar MD  [facility]  8:30 AM  12/11/2024      Again, thank you for allowing me to participate in the care of your patient.        Sincerely,        Darron Andujar MD  "

## 2024-12-16 ENCOUNTER — TELEPHONE (OUTPATIENT)
Dept: INFECTIOUS DISEASES | Facility: CLINIC | Age: 64
End: 2024-12-16
Payer: COMMERCIAL

## 2024-12-16 DIAGNOSIS — T84.50XD INFECTION OF PROSTHETIC JOINT, SUBSEQUENT ENCOUNTER: Primary | ICD-10-CM

## 2024-12-16 NOTE — TELEPHONE ENCOUNTER
"Labs from [facility] \"pulled over\" from care everywhere.     Pat Barrera,[facility]  Rheumatology & ID  [address] [place] [address] [phone]    "

## 2024-12-23 ENCOUNTER — MYC MEDICAL ADVICE (OUTPATIENT)
Dept: INFECTIOUS DISEASES | Facility: CLINIC | Age: 64
End: 2024-12-23
Payer: COMMERCIAL

## 2024-12-23 ENCOUNTER — TELEPHONE (OUTPATIENT)
Dept: INFECTIOUS DISEASES | Facility: CLINIC | Age: 64
End: 2024-12-23
Payer: COMMERCIAL

## 2024-12-23 DIAGNOSIS — T84.50XD INFECTION OF PROSTHETIC JOINT, SUBSEQUENT ENCOUNTER: Primary | ICD-10-CM

## 2024-12-23 RX ORDER — CEFADROXIL 500 MG/1
1000 CAPSULE ORAL 2 TIMES DAILY
Qty: 360 CAPSULE | Refills: 3 | Status: SHIPPED | OUTPATIENT
Start: 2024-12-23 | End: 2025-12-18

## 2024-12-23 RX ORDER — CEFADROXIL 500 MG/1
1000 CAPSULE ORAL 2 TIMES DAILY
Qty: 360 CAPSULE | Refills: 3 | OUTPATIENT
Start: 2024-12-23

## 2024-12-23 NOTE — TELEPHONE ENCOUNTER
Pending Prescriptions:                       Disp   Refills    cefadroxil (DURICEF) 500 MG capsule       360 ca*3            Sig: Take 2 capsules (1,000 mg) by mouth 2 times           daily.

## 2024-12-23 NOTE — TELEPHONE ENCOUNTER
[facility] Call Center    Phone Message    May a detailed message be left on voicemail: yes     Reason for Call: Medication Refill Request    Has the patient contacted the pharmacy for the refill? Yes   Name of medication being requested: Cefadroxil   Provider who prescribed the medication: Dr. Harper  Pharmacy:   [id number] - [facility] [address]     Date medication is needed: ASAP, pt requesting refill to be expedited. States she will be completely out by Wednesday.       Action Taken: Other: ID    Travel Screening: Not Applicable     Date of Service: 12/23/24

## 2024-12-23 NOTE — TELEPHONE ENCOUNTER
Called in Nicci's updated prescription. Gave her 90 days and 3 refills. She should continue this for 1 year total (Tentative end: 7/12/2025).     Andressa

## 2024-12-23 NOTE — TELEPHONE ENCOUNTER
Received patient [employer] message and phone call. Responded to patient via [employer] to let her know Dr. Harper has been contacted regarding her refill.

## 2024-12-24 NOTE — DISCHARGE SUMMARY
"[facility]  Hospitalist Discharge Summary      Date of Admission:  7/12/2024  Date of Discharge:  7/16/2024  7:00 PM  Discharging Provider: Indra Camilo MD  Discharge Service: Hospitalist Service, GOLD TEAM 17    Discharge Diagnoses   1 Right knee pain, concern for prosthetic knee infection     Clinically Significant Risk Factors     # Obesity: Estimated body mass index is 40.97 kg/m  as calculated from the following:    Height as of this encounter: 1.575 m (5' 2\").    Weight as of this encounter: 101.6 kg (224 lb).       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 Labs notable for leukocytosis of 14.7 with left shift. . SED 83. Right knee XR showed a moderate effusion with no evidence of malalignment, hardware loosening, or acute fracture.     New Today  - Constipated, trial for prn suppository   - Pending PICC placement  - Home infusion arranged appreciate " I have reviewed and agree with my Chiropractic Technician's note.    CHIEF COMPLAINT:  Chronic neck/upper back pain    SUBJECTIVE (cont'd):  No add'l info to add to CT's rooming note.    OBJECTIVE FINDINGS:   (Cervical spine) Cervical spine facet joint function is within normal limits except for her C5 facet joints that exhibited limited passive range of motion and segmental restriction with tenderness upon palpation. The following muscles were examined for normal flexibility and tone: right and left paraspinal muscles; right and left upper trapezius muscle: right and left scalene muscle; right and left levator scapulae muscle;  right and left suboccipital muscle. These muscles were within normal limits except for her bilateral suboccipital muscles that exhibited limited flexibility and were hypertonic at rest.    (Thoracic spine) Thoracic spine facet joint function is within normal limits except for her T9 facet joints that exhibited limited passive range of motion and segmental restriction and tenderness upon palpation. The following muscles were examined for normal flexibility and tone: right and left rhomboid muscle; right and left serratus muscle; thoracic paraspinal muscles. These muscles were within normal limits except for her bilateral rhomboids that exhibited limited flexibility and abnormal resting tone.    (Lumbar and Pelvic Spine)Lumbar spine facet joint function is within normal limits except for her L2-3 facet joints that exhibited limited passive range of motion and segmental restriction and tenderness on palpation; Sacroiliac joint function is within normal limits. The following muscles were examined for flexibility and tone at rest; right and left hip flexor muscle; right and left hip adductor muscle; right and left hip rotator muscle; right and left piriformis muscle; right and left hamstring muscle; right and left Gluteus medius muscle and gluteus jose muscle; right and left quadratus lumborum  "care coordinator team  - IV abx per ID   - No medical concerns        Today's changes:   Pain overall better.  No acute medical concern.  Postop management per surgical team   Antibiotics per Infectious disease: Discontinue IV vancomycin.  Continue IV ceftriaxone.  Duration 6 weeks.  Will get PICC line placed.  PT, OT,  following     7/13/2024  Sp  Irrigation and debridement lower extremity, combined, Right - Leg; Polyethylene exchange, Right - Knee.by Dr Black. EBL: 150 ml.  Complications: None      #1 Right knee pain, concern for prosthetic knee infection  Orthopedic surgery consulted. Right knee aspirated with cloudy aspirate. Cell count with differential and aerobic/anaerobic cultures obtained. Sp I and D as above 7/13.   Post Op Mx per Ortho.   Resume regular diet.   PT/OT. Activity per ortho.   DVT PPx per Ortho.   Infectious disease consulted, appreciate input.  Antibiotics plan per infectious disease.  Continue IV vancomycin, IV ceftriaxone.  Follow-up IntraOp cultures.  Check A1c_5.     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       #5 CKD III  Creatinine 1.13 on admission, at baseline     #6 Hypothyroidism  Most recent TSH 0.56 on 04/24/24  Continue PTA levothyroxine     #7 Hypertension  Controlled  Not on any antihypertensives     #8 Severe Obesity: Estimated body mass index is 40.97 kg/m  as calculated from the following:  Height as of this encounter: 1.575 m (5' 2\").  Weight as of this encounter: 101.6 kg (224 lb).            Consultations This Hospital Stay   OCCUPATIONAL THERAPY ADULT IP CONSULT  PHYSICAL THERAPY ADULT IP CONSULT  ORTHOPAEDIC SURGERY ADULT/PEDS IP CONSULT  INFECTIOUS DISEASE [facility] ADULT IP CONSULT  INFECTIOUS DISEASE [facility] ADULT IP " muscle; left and right Tensor fasciae latae muscle; left and right internal hip rotators muscle; right and left quadriceps muscle.  These muscles were found to be within normal limits.    Orthopedic/Neurological tests:  None    Assessment:   1. Somatic dysfunction of spine, cervical    2. Chronic neck pain    3. Somatic dysfunction of spine, thoracic    4. Pain in thoracic spine    5. Somatic dysfunction of lumbar region      Complicating Factors/Co-morbidities:   Chronic symptoms    PLAN:  Patient was evaluated and then treated with manipulation to her thoracic, and lumbar spine via diversified manipulation technique and to her cervical spine via manual cervical traction technique to improve function and passive range of motion of facet and/or sacroiliac joints. Patient also treated with contract/relax stretch to muscle noted as taut in objective findings to improve flexibility and decrease strain to spinal structures. No high velocity low amplitude manipulation performed in her cervical spine today.Patient reports no adverse response to manipulation.     Therapeutic Exercise/Rehab/Modalities performed today: Moist heat was applied to the cervical and thoracic spine for 9 minutes following supervised skilled treatment.   Patient reports no adverse response to treatment.     Patient Instruction/Education:   Patient was educated in the nature of condition and likely pain generators as well as plan of care to resolve symptoms and improve muscular and skeletal function.  Patient noted verbal understanding of condition and is agreeable to plan of care. Patient stated understanding of, and was in agreement with, the discussed instructions.    Goals of Care: Goal of care is to improve muscular and skeletal function and provide symptom relief.   Additional Goals Include and to be obtained by end of this plan of care.   To be obtained by end of this plan of care:  Patient independent with modified and progressed home  CONSULT  ORTHOPAEDIC SURGERY ADULT/PEDS IP CONSULT  PHARMACY TO DOSE VANCO  PHYSICAL THERAPY ADULT IP CONSULT  OCCUPATIONAL THERAPY ADULT IP CONSULT  VASCULAR ACCESS ADULT IP CONSULT  NEPHROLOGY GENERAL ADULT IP CONSULT  CARE MANAGEMENT / SOCIAL WORK IP CONSULT  [facility] PHARMACY IP CONSULT  [facility] PHARMACY IP CONSULT    Code Status   Prior    Time Spent on this Encounter   I, Indra Camilo MD, personally saw the patient today and spent greater than 30 minutes discharging this patient.       Indra Camilo MD  [facility] MED SURG ORTHOPEDIC  [address] [place] [id number]  Phone: [phone]  Fax: [phone]  ______________________________________________________________________    Physical Exam   Vital Signs:                    Weight: 224 lbs 0 oz  General Appearance: Appears comfortable.  Respiratory: Without wheezes rhonchi or rales.  CTA  Cardiovascular: RRR, without murmurs  GI: Soft, nontender, plus BS  Skin: Without obvious bleeding, bruising or excoriations      Primary Care Physician   Wale Thurston    Discharge Orders      Comprehensive metabolic panel    Fax lab result to [facility] Information:  Phone: [phone]  Fax: [phone] (Attention ID clinic nurs     CRP, inflammation    Fax lab result to [facility] Information:  Phone: [phone]  Fax: [phone] (Attention ID clinic nurs     Home Care Referral      Home Infusion Referral      [facility] enrollment and ID Clinic Referral      [facility] enrollment and ID Clinic Referral      Physical Therapy  Referral      Reason for your hospital stay    Post op infection     Activity    Your activity upon discharge: activity as tolerated     Adult [facility] Follow-up and recommended labs and tests    Follow up with primary care provider, Wale Thurston, within 7 days to evaluate medication change and for hospital follow- up.  No  exercise program.  Patient will decrease symptoms by 60-80% of current Visual Analog Pain Scale Score.  Patient’s active range-of-motion will be within normal limits with no/minimal pain.   Patient will be able to tolerate standing activities for greater than or equal to 90 minutes without pain/difficulty.  Patient will demonstrate proper body mechanics for sitting and standing.  Patient will be able to tolerate sitting activities for greater than or equal to 90 minutes without pain/difficulty.  Patient will be able to sleep/rollover in bed without pain or difficulty.   Patient will be able to walk without pain or difficulty for greater than or equal to 45 minutes.   Patient will be able to bend and lift for activities of daily living and instrumental activities of daily living completion at home and work without pain/difficulty.  Patient’s DOD/VA pain questionnaire will be decreased by 50-70%.     Other treatment options discussed with patient: None    Patient is to return next week for continued care and treatment of her condition consistent with plan of care.    Treatment today is considered active treatment (AT).     Length of Visit: 31 minutes       "follow up labs or test are needed.      Appointments on [facility] and/or [facility] (with [facility] or [facility] provider or service). Call [phone] if you haven't heard regarding these appointments within 7 days of discharge.     Reason for your hospital stay    I & D and matt exchange right knee     When to call - Contact Surgeon Team    You may experience symptoms that require follow-up before your scheduled appointment. Refer to the \"Stoplight Tool\" for instructions on when to contact your Surgeon Team if you are concerned about pain control, blood clots, constipation, or if you are unable to urinate.     When to call - Reach out to Urgent Care    If you are not able to reach your Surgeon Team and you need immediate care, go to the Orthopedic Walk-in Clinic or Urgent Care at your Surgeon's office.  Do NOT go to the Emergency Room unless you have shortness of breath, chest pain, or other signs of a medical emergency.     When to call - Reasons to Call 911    Call 911 immediately if you experience sudden-onset chest pain, arm weakness/numbness, slurred speech, or shortness of breath     Discharge Instruction - Breathing exercises    Perform breathing exercises 10 times per hours while awake for 2 weeks. (If given, use your Incentive Spirometer)     Symptoms - Fever Management    A low grade fever can be expected after surgery.  Use acetaminophen (TYLENOL) as needed for fever management.  Contact your Surgeon Team if you have a fever greater than 101.5 F, chills, and/or night sweats.     Symptoms - Constipation management    Constipation (hard, dry bowel movements) is expected after surgery due to the combination of being less active, the anesthetic, and the opioid pain medication.  You can do the following to help reduce constipation:  ~  FLUIDS:  Drink clear liquids (water or Gatorade), or juice (apple/prune).  ~  DIET:  Eat a fiber rich diet.    ~  ACTIVITY:  Get up and move around several times a day.  " Increase your activity as you are able.  MEDICATIONS:  Reduce the risk of constipation by starting medications before you are constipated.  You can take Miralax   (1 packet as directed) and/or a stool softener (Senokot 1-2 tablets 1-2 times a day).  If you already have constipation and these medications are not working, you can get magnesium citrate and use as directed.  If you continue to have constipation you can try an over the counter suppository or enema.  Call your Surgeon Team if it has been greater than 3 days since your last bowel movement.     Symptoms - Reduced Urine Output    Changes in the amount of fluids you drank before and after surgery may result in problems urinating.  It is important to stay well-hydrated after surgery and drink plenty of water. If it has been greater than 8 hours since you have urinated despite drinking plenty of water, call your Surgeon Team.     Activity - Exercises to prevent blood clots    Unless otherwise directed by your Surgeon team, perform the following exercises at least three times per day for the first four weeks after surgery to prevent blood clots in your legs: 1) Point and flex your feet (Ankle Pumps), 2) Move your ankle around in big circles, 3) Wiggle your toes, 4) Walk, even for short distances, several times a day, will help decrease the risk of blood clots.     Comfort and Pain Management - Pain after Surgery    Pain after surgery is normal and expected.  You will have some amount of pain for several weeks after surgery.  Your pain will improve with time.  There are several things you can do to help reduce your pain including: rest, ice, elevation, and using pain medications as needed. Contact your Surgeon Team if you have pain that persists or worsens after surgery despite rest, ice, elevation, and taking your medication(s) as prescribed. Contact your Surgeon Team if you have new numbness, tingling, or weakness in your operative extremity.     Comfort and  Pain Management - Swelling after Surgery    Swelling and/or bruising of the surgical extremity is common and may persist for several months after surgery. In addition to frequent icing and elevation, gentle compressive support with an ACE wrap or tubigrip may help with swelling. Apply compression regularly, removing at least twice daily to perform skin checks. Contact your Surgeon Team if your swelling increases and is NOT associated with an increase in your activity level, or if your swelling increases and is associated with redness and pain.     Comfort and Pain Management - Cold therapy    Ice can be used to control swelling and discomfort after surgery. Place a thin towel over your operative site and apply the ice pack overtop. Leave ice pack in place for 20 minutes, then remove for 20 minutes. Repeat this 20 minutes on/20 minutes off routine as often as tolerated.     Medication Instructions - Acetaminophen (TYLENOL) Instructions    You were discharged with acetaminophen (TYLENOL) for pain management after surgery. Acetaminophen most effectively manages pain symptoms when it is taken on a schedule without missing doses (every four, six, or eight hours). Your Provider will prescribe a safe daily dose between 3000 - 4000 mg.  Do NOT exceed this daily dose. Most patients use acetaminophen for pain control for the first four weeks after surgery.  You can wean from this medication as your pain decreases.     Medication Instructions - NSAID Instructions    You were discharged with an anti-inflammatory medication for pain management to use in combination with acetaminophen (TYLENOL) and the narcotic pain medication.  Take this medication exactly as directed.  You should only take one anti-inflammatory at a time.  Some common anti-inflammatories include: ibuprofen (ADVIL, MOTRIN), naproxen (ALEVE, NAPROSYN), celecoxib (CELEBREX), meloxicam (MOBIC), ketorolac (TORADOL).  Take this medication with food and water.  "    Follow Up Care    Follow-up with your Surgeon Team in 2 weeks for wound check.     Medication instructions -  Anticoagulation - aspirin    Take the aspirin as prescribed for a total of four weeks after surgery.  This is given to help minimize your risk of blood clot.     Comfort and Pain Management - LOWER Extremity Elevation    Swelling is expected for several months after surgery. This type of swelling is usually associated with gravity and activity, and can be improved with elevation.   The best way to do this is to get your \"toes above your nose\" by laying down and placing several pillows lengthwise under your calf and heel. When elevating your leg keep your knee completely straight. Perform this elevation as often as possible especially for the first two weeks after surgery.     Activity - Total Knee Arthroplasty     Return to Driving    Return to driving - Driving is NOT permitted until directed by your provider. Under no circumstance are you permitted to drive while using narcotic pain medications.     Dressing / Wound Care - Wound    You have a clean dressing on your surgical wound. Dressing change instructions as follows: remove your dressing in 7 days and leave incision open to air. Contact your Surgeon Team if you have increased redness, warmth around the surgical wound, and/or drainage from the surgical wound.     Dressing / Wound Care - NO Tub Bathing    Tub bathing, swimming, or any other activities that will cause your incision to be submerged in water should be avoided until allowed by your Surgeon.     NO Precautions    No precautions directed by your Provider.  You may perform range of motion activities as tolerated.     Weight bearing as tolerated    Weight bearing as tolerated on your operative extremity.     Dressing / Wound care - Shower with wound/dressing covered    You must COVER your dressing or incision with saran wrap (or any other non-permeable covering) to allow the incision to " remain dry while showering.  You may shower 7 days after surgery as long as the surgical wound stays dry. Continue to cover your dressing or incision for showering until your first office visit.  You are strictly prohibited from soaking or submerging the surgical wound underwater.     Diet    Follow this diet upon discharge: Orders Placed This Encounter      Advance Diet as Tolerated: Regular Diet Adult     Discharge Instruction - Regular Diet Adult    Return to your pre-surgery diet unless instructed otherwise     CBC with platelets and differential    Fax lab result to [facility] and [facility] ID Clinic Information:  Phone: [phone]  Fax: [phone] (Attention ID clinic nurs       Significant Results and Procedures   Results for orders placed or performed during the hospital encounter of 07/12/24   XR Knee Right 3 Views    Narrative    XR KNEE RIGHT 3 VIEWS   7/12/2024 10:25 AM     HISTORY: pain/swelling  COMPARISON: None.       Impression    IMPRESSION: Right total knee arthroplasty, satisfactory alignment. No  signs of loosening. No acute fracture. Moderate joint effusion.    LYLA ADAN MD         SYSTEM ID:  [id number]   US Lower Extremity Venous Duplex Right    Narrative    [place] [facility]  DATE: 7/12/2024    EXAM: RIGHT LOWER EXTREMITY VENOUS ULTRASOUND    INDICATION: Right lower extremity pain and swelling.    TECHNIQUE: Duplex imaging was performed utilizing gray-scale,  compression, augmentation as appropriate, color-flow, Doppler waveform  analysis, and spectral Doppler imaging.    COMPARISON: None.    FINDINGS: The right common femoral, profunda femoral, femoral,  popliteal, and segmentally visualized calf veins are patent and  compressible with appropriate vascular waveforms. No deep venous  thrombus is identified. The great saphenous vein is patent.    For comparison, the left common femoral vein is patent and  compressible with normal venous waveforms.      Impression     IMPRESSION:   Negative for deep vein thrombosis.    CHANO BULL MD         SYSTEM ID:  [id number]   XR Knee Port Right 1/2 Views    Narrative    EXAM: XR KNEE PORT RIGHT 1/2 VIEWS  LOCATION: [facility]  DATE: 7/13/2024    INDICATION: Post Op Total Knee  COMPARISON: None.      Impression    IMPRESSION: Postoperative changes right total knee arthroplasty and patellar resurfacing. The components appear well seated. Soft tissue swelling about the knee joint but no significant intra-articular effusion.     *Note: Due to a large number of results and/or encounters for the requested time period, some results have not been displayed. A complete set of results can be found in Results Review.       Discharge Medications   Discharge Medication List as of 7/16/2024  5:44 PM        START taking these medications    Details   aspirin (ASA) 81 MG chewable tablet Take 2 tablets (162 mg) by mouth daily, Disp-60 tablet, R-0, E-Prescribe      cefTRIAXone (ROCEPHIN) 2 GM vial Inject 2 g into the vein every 24 hours for 41 days, R-0, No Print Out      melatonin 5 MG tablet Take 1 tablet (5 mg) by mouth nightly as needed for sleep, Disp-30 tablet, R-0, E-Prescribe      polyethylene glycol (MIRALAX) 17 GM/Dose powder Take 17 g by mouth daily, Disp-510 g, R-0, E-Prescribe      senna-docusate (SENOKOT-S/PERICOLACE) 8.6-50 MG tablet Take 2 tablets by mouth 2 times daily as needed for constipation, Disp-60 tablet, R-0, E-Prescribe      gabapentin (NEURONTIN) 100 MG capsule Take 2 capsules (200 mg) by mouth 3 times daily, Disp-30 capsule, R-0, E-Prescribe      methocarbamol (ROBAXIN) 500 MG tablet Take 1 tablet (500 mg) by mouth 4 times daily, Disp-20 tablet, R-0, E-Prescribe      oxyCODONE (ROXICODONE) 5 MG tablet Take 1-2 tablets (5-10 mg) by mouth every 3 hours as needed for severe pain (IF pain not managed with non-pharmacological and non-opioid interventions), Disp-30 tablet, R-0,  E-Prescribe           CONTINUE these medications which have NOT CHANGED    Details   acetaminophen (TYLENOL) 325 MG tablet Take 2 tablets (650 mg) by mouth every 4 hours as needed for other, Disp-60 tablet, R-0, E-Prescribe      COMPRESSION STOCKINGS 2 each every 12 hours, Disp-2 Product, R-1, Local PrintBLE knee high Velcro compression garments - [facility] (Fax Number: [phone]).      cycloSPORINE modified (GENERIC EQUIVALENT) 25 MG capsule Take 3 capsules (75 mg) by mouth every morning AND 2 capsules (50 mg) every evening., Disp-450 capsule, R-3, E-Prescribe      famotidine (PEPCID) 20 MG tablet Take 1 tablet (20 mg) by mouth 2 times daily, Disp-60 tablet, R-3, E-Prescribe      levothyroxine (SYNTHROID/LEVOTHROID) 112 MCG tablet Take 112.5 mcg by mouth every morning, HistoricalManaged by PCP      rosuvastatin (CRESTOR) 5 MG tablet Take 1 tablet (5 mg) by mouth daily, Disp-90 tablet, R-0, E-PrescribeFurther fills through PCP      Cholecalciferol (VITAMIN D-3) 25 MCG (1000 UT) CAPS Take 1,000 Units by mouth 2 times daily, Disp-60 capsule, R-1, E-Prescribe           STOP taking these medications       amoxicillin (AMOXIL) 500 MG capsule Comments:   Reason for Stopping:             Allergies   Allergies   Allergen Reactions    Ciprofloxacin Dizziness and Nausea     Had significant vertigo when she was taking ciprofloxacin, cefpodoxime, and linezolid for L knee arthroplasty skin infection in 11/2021. Vertigo stopped after discontinuing all antibiotics. Of these antibiotics, ciprofloxacin is most associated with vertigo.       Furosemide Rash and Swelling     Other reaction(s): rash  8/14/14  Other reaction(s): rash  8/14/14    Cefpodoxime Dizziness and Nausea     Had significant vertigo when she was taking ciprofloxacin, cefpodoxime, and linezolid for L knee arthroplasty skin infection in 11/2021. Vertigo stopped after  discontinuing all antibiotics. Tolerated ceftriaxone without issue.        Food      Fruits with cores and pits  Apples    Linezolid Dizziness and Nausea     Had significant vertigo when she was taking ciprofloxacin, cefpodoxime, and linezolid for L knee arthroplasty skin infection in 11/2021. Vertigo stopped after discontinuing all antibiotics. Of these antibiotics, ciprofloxacin is most associated with vertigo. She is willing to try these in the future if needed.       Sulfamethoxazole-Trimethoprim      Had wrist swelling that resolved with discontinuation that occurred many years ago.

## 2025-01-07 ENCOUNTER — VIRTUAL VISIT (OUTPATIENT)
Dept: INFECTIOUS DISEASES | Facility: CLINIC | Age: 65
End: 2025-01-07
Attending: INTERNAL MEDICINE
Payer: COMMERCIAL

## 2025-01-07 VITALS — WEIGHT: 230 LBS | HEIGHT: 62 IN | BODY MASS INDEX: 42.33 KG/M2

## 2025-01-07 DIAGNOSIS — E87.5 HYPERKALEMIA: ICD-10-CM

## 2025-01-07 DIAGNOSIS — R17 SERUM TOTAL BILIRUBIN ELEVATED: ICD-10-CM

## 2025-01-07 DIAGNOSIS — Z94.4 STATUS POST LIVER TRANSPLANTATION (H): ICD-10-CM

## 2025-01-07 DIAGNOSIS — M00.261 STREPTOCOCCAL ARTHRITIS OF RIGHT KNEE (H): ICD-10-CM

## 2025-01-07 DIAGNOSIS — T84.50XD INFECTION OF PROSTHETIC JOINT, SUBSEQUENT ENCOUNTER: Primary | ICD-10-CM

## 2025-01-07 PROCEDURE — G2211 COMPLEX E/M VISIT ADD ON: HCPCS | Performed by: INTERNAL MEDICINE

## 2025-01-07 PROCEDURE — 98007 SYNCH AUDIO-VIDEO EST HI 40: CPT | Performed by: INTERNAL MEDICINE

## 2025-01-07 ASSESSMENT — PAIN SCALES - GENERAL: PAINLEVEL_OUTOF10: NO PAIN (0)

## 2025-01-07 NOTE — PROGRESS NOTES
Virtual Visit Details    Type of service:  Video Visit     Originating Location (pt. Location): Home    Distant Location (provider location):  Off-site  Platform used for Video Visit: [facility] [address] [address]  Phone: [phone]  Fax: [phone]    Patient:  Nicci Pemberton, Date of birth 1960  Date of Visit:  01/07/2025  Referring Provider Andressa Harper MD  Reason for visit: R knee PJI    Recommendations   R knee PJI  Continue cefadroxil 1000 mg BID   Will plan to complete ~12 months of suppressive antibiotic therapy (Tentative end 7/12/2025).    Monthly CBC with diff, CMP while on antibiotics.     Elevated Tbili  Unlikely related to cefadroxil. Have discussed with her primary hepatologist, Dr. Leventhal, who agrees and is monitoring this appropriately.     Mild Hyperkalemia  Reviewed her medication list. Not on any diuretics, ACE/ARB. Is on cyclosporine, which can cause hyperkalemia, although she has been on the same dose for years.  I've provided information about high potassium foods. She will try to cut down her potassium intake.    We will repeat her BMP in 2 weeks to monitor her potassium. If this remains elevated, she should have more extensive work-up through her PCP.     Other  Eosinophilia has now resolved (likely 2/2 long-term CTX)  Will need 6 month COVID-19 booster in ~3/2025    Follow-up with me in 3 months to monitor treatment course.     I spent 49 minutes on the date of the visit reviewing the patient's chart, interpreting laboratory and imaging studies, talking with the patient, in documentation, and coordinating care.     The longitudinal plan of care for the prosthetic joint infection as documented were addressed during this visit. Due to the added complexity in care, I will continue to support Nicci in the subsequent management and with ongoing continuity of care.    Anrdessa Harper,  MD  Transplant Infectious Diseases Attending  [phone]      Assessment   Transplants:  8/18/2019 (Liver); POD  1969.  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 She was recently admitted from 7/12-7/16 with R knee pain. Found to have  Strep agalactiae prosthetic joint infection. We are seeing her today in follow-up from her hospital stay.     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 Developed R knee pain about ~2 days prior to presentation. Source unclear. We are using the debridement, antibiotics, implant retention (DAIR) strategy for management with attempt of infection eradication.     She did have short duration of symptoms (~3 days) prior to debridement and the fact that this is a Strep is a favorable prognosis for eradication.  However, her prosthesis was placed in 2020, which places her at higher risk for treatment failure under the DAIR strategy.     Completed 6 weeks of IV ceftriaxone (7/13/24-8/27/24). Transitioned to amoxicillin-->cefadroxil. Plan to complete 12 months total antibiotics if she is tolerating this (end 7/12/2025).         Hyperbilirubinemia  She does have a history of Gilbert's. Have communicated with her primary hepatologist, Dr. Leventhal, who agrees it is unlikely due to her antibiotics. RUQ US (11/6/24) without abnormalities. We did discuss transitioning back to amoxicillin to see if her Tbili decreases, but her Tbili had already started to increase prior to the transition to cefadroxil so we agreed this was less likely to be helpful.     Hyperkalemia  Had  "mild hyperkalemia (5.2 on 12/10 and 5.3 on 1/6) of unclear etiology. Not taking any diuretics, ACE, or ARB. Is on cyclosporine, which has been associated with hyperkalemia itself and with pseudohypoaldosteronism (although note that she has been hypernatremic and not hyponatremic). I will supply information regarding low potassium foods. We will repeat her potassium in 2 weeks. If still elevated, I will have her see her PCP more expeditiously for work-up.     Transplant Checklist  - QTc interval: 414 (9/2021)  - Pneumocystis prophylaxis: None  - Bacterial prophylaxis: cefadroxil for ongoing treatment of joint infection.   - Fungal prophylaxis: None  - Serostatus & viral prophylaxis: CMV D-/R+, HSV ?, EBV D+/R+ None  - Immunization status: Up-to-date on PCV-20 (2022), RSV (12/2023). Will need Fall COVID-19 booster (already scheduled)  - Gamma globulin status:   Recent Labs   Lab Test 06/18/18  1024   IGG 1,960*     - Antibiotic allergies:   Experienced vertigo and GI symptoms while on ciprofloxacin, cefpodoxime, and linezolid back in 11/2021. Vertigo stopped after discontinuing all antibiotics. Not true allergy. Ciprofloxacin most associated with vertigo.   Had IV infiltrate when give vancomycin in 6/2024. Not a true allergy so de-labeled.      Prior infectious diseases issues   L TKA would infection (10/2021): Underwent I&D (11/10/21). Wound cultures grew E faealis (S amp and vanc), Citrobacter koseri, MRSE, Strep oralis, Anaerococcus, Peptoniphilus asaccharolytic. Treated by orthopedics per documentation by Orthopedic consultation 7/12/2024: \"Keflex x 2 days switched to: Linezolid 600 mg p.o. twice daily x10 days, Vantin 200 mg p.o. twice daily x10 days. Then Vantin 200mg PO BID every day x 10days, linezolid 600mg PO BID daily x 10 days.\"   SARS-CoV-2: Has 2 documented infections. One in 12/2020 and one in 5/2021.   Mild Eosinophilia (0.6-0.7): No risk for Strongy/schisto exposure. Likely mild reaction to long-term " "use of CTX, as it resolved with CTX discontinuation. Will not list as an allergy, since she was able to tolerate ~4 weeks without issue.     Interval Events    Since last seen by me on 10/1/2024, she has been doing well. Did have mild elevation in LFTs/Tbili and underwent RUQ US which was WNL.     Over the past month has also experienced mild hyperkalemia with K 5.3 and 5.2. No changes in medications recently. She is feeling fine. Doesn't eat many high potassium foods.     Has upcoming appointment with Dr. Black on 1/27.       Prior Antibiotics  Cefadroxil: 9/27-current  Amoxicillin: 8/26-9/26  Ceftriaxone: 7/12-8/25/24  Vancomycin: 7/12-7/15    History of Presenting Illness    Her recent R knee PJI history is as follows:   ~7/10/24: Develops R knee pain. Had shaking chills night before knee pain developed with a low-grade temperature of 100.4. Had vomiting and some diarrhea.   7/12/24: R knee arthrocentesis with 60 mL cloudy synovial fluid. WBC 91,760 (96% PMNs). Growth of Strep agalactiae (Group B strep)  7/13/24: Underwent I&D with polyethylene liner exchange. Per op note, \"grossly evident purulent appearing fluid within knee joint\". Vancomycin powder was sprinkled in the joint. Intraoperative culture also grew Strep agalactiae.     Exposure History   Demographics: Lived in [place].    Travel: Has spent time in [place] (stayed at a resort) and [place]. No time in [place]. No other overseas travel. In the [place] traveled around the [place] throughout the country not spending much time in each city. [place] travel[place] For her job, was on [employer]. [place] for vacation.     Vaccines     Immunization History   Administered Date(s) Administered    COVID-19 12+ (Pfizer) 11/14/2023    COVID-19 Bivalent 18+ (Moderna) 11/07/2022    COVID-19 Monovalent 18+ (Moderna) 03/18/2021, 04/15/2021, 02/15/2022    COVID-19 Monovalent Booster 18+ (Moderna) 07/12/2022    Flu 65+ " (Fluad) 10/13/2020    Flu, Unspecified 10/19/2011    Hepatitis B, Adult 09/21/2018, 10/22/2018, 01/17/2019, 04/09/2019, 06/05/2019    Historical DTP/aP 08/26/1965    Influenza (High Dose) Trivalent,PF (Fluzone) 10/21/2019, 09/25/2024    Influenza (IIV3) PF 10/19/2011    Influenza Vaccine 18-64 (Flublok) 02/01/2022, 10/06/2022    Influenza Vaccine >6 months,quad, PF 11/07/2023    Influenza Vaccine, 6+MO IM (QUADRIVALENT W/PRESERVATIVES) 09/21/2018    Pneumococcal 20 valent Conjugate (Prevnar 20) 08/16/2022    Pneumococcal 23 valent 02/01/2005, 10/19/2011, 08/27/2020    RSV Vaccine (Abrysvo) 12/12/2023    RSV Vaccine (Arexvy) 11/01/2023, 12/12/2023    TD,PF 7+ (Tenivac) 10/02/1997    TDAP (Adacel,Boostrix) 09/21/2018    TDAP Vaccine (Adacel) 06/23/2008    TDAP Vaccine (Boostrix) 06/23/2008    Zoster recombinant adjuvanted (SHINGRIX) 08/27/2020, 01/21/2021    Zoster vaccine, live 06/12/2016         Physical Exam   No vital signs taken, as this was a virtual visit   Gen: Alert, oriented. Talking into the camera and sitting in her chair.   HEENT: NC/AT. No scleral icterus noted. Neck supple and moving in all directions.   CV: Appears well-perfused. No cyanosis noted per video visit.   Resp: Breathing comfortably on room air. No increased work of breathing noted.   Skin: No rashes/lesions noted on face or arms in the camera    Data     Data   Laboratory data and imaging listed below was reviewed prior to this encounter.     Microbiology:    Culture   Date Value Ref Range Status   07/13/2024 No anaerobic organisms isolated  Final   07/13/2024 No Growth  Final   07/13/2024 (A)  Final    1+ Streptococcus agalactiae (Group B Streptococcus)     Comment:     Susceptibilities done on previous cultures   07/13/2024 No anaerobic organisms isolated  Final   07/13/2024 No Growth  Final   07/13/2024 (A)  Final    1+ Streptococcus agalactiae (Group B Streptococcus)     Comment:     Susceptibilities done on previous cultures    07/13/2024 No anaerobic organisms isolated  Final   07/13/2024 No Growth  Final   07/13/2024 (A)  Final    1+ Streptococcus agalactiae (Group B Streptococcus)     Comment:     Susceptibilities done on previous cultures   07/12/2024 No Growth  Final   07/12/2024 No Growth  Final   07/12/2024 (A)  Final    2+ Streptococcus agalactiae (Group B Streptococcus)   07/12/2024 No anaerobic organisms isolated  Final   11/10/2021 4+ Anaerococcus species (A)  Final     Comment:     Susceptibilities done on previous cultures   11/10/2021 2+ Peptoniphilus asaccharolyticus (A)  Final   11/10/2021 No Growth  Final   11/10/2021 No Growth  Final   11/10/2021 4+ Citrobacter koseri (A)  Final     Comment:     Susceptibilities done on previous cultures   11/10/2021 4+ Enterococcus faecalis (A)  Final     Comment:     Susceptibilities done on previous cultures   11/10/2021 4+ Streptococcus oralis (A)  Final     Comment:     Susceptibilities done on previous cultures   11/10/2021 4+ Citrobacter koseri (A)  Final     Comment:     Susceptibilities done on previous cultures   11/10/2021 Isolated in broth only Enterococcus faecalis (A)  Final     Comment:     On day 1 of incubation  Susceptibilities done on previous cultures   11/10/2021 4+ Anaerococcus species (A)  Final   11/10/2021 No Growth  Final   11/10/2021 4+ Citrobacter koseri (A)  Final   11/10/2021 4+ Enterococcus faecalis (A)  Final   11/10/2021 4+ Staphylococcus epidermidis (A)  Final   11/10/2021 3+ Streptococcus oralis (A)  Final   10/13/2020 No Growth  Final   08/05/2019 No Growth  Final   08/02/2019   Final    No Salmonella, Shigella, Yersinia, or Campylobacter.   07/31/2019 No Growth  Final   04/14/2019 Mixture of urogenital organisms  Final   05/04/2018 CITROBACTER KOSERI (A)  Final     Comment:     >100,000 col/ml Citrobacter koseri     GS Culture   Date Value Ref Range Status   07/13/2024 See corresponding culture for results  Final   07/13/2024 See corresponding  "culture for results  Final   07/13/2024 See corresponding culture for results  Final   , Inflammatory Markers:   Recent Labs   Lab Test 10/23/24  1023 07/12/24  1003 12/10/20  0844 12/09/20  0637 12/08/20  0511 12/07/20  0559 12/06/20  0504 10/30/20  0649 10/28/20  1603 06/18/18  1024   SED 32* 83*  --   --   --   --   --   --  100* 17   CRP  --   --  51.6* 87.0* 165.0* 210.0* 119.0* 54.7* 108.0* 8.9*   , Hematology Studies:    Recent Labs   Lab Test 09/06/24  1055 07/15/24  0648 07/14/24  0645 07/13/24  0620 07/12/24  1003 05/29/24  1030 02/20/24  1028 04/06/21  1018 03/29/21  1040 03/27/21  0934 03/25/21  1325 01/07/21  1146 12/29/20  1108 12/11/20  0523 12/10/20  0844 12/09/20  0637   WBC 6.5 8.0  --  9.7 14.7* 5.2 4.5   < > 18.4* 4.7 5.4   < > 4.6   < > 5.7 5.9   ANEU  --   --   --   --   --   --   --   --  16.0* 3.1 3.2  --  3.0  --  4.1 3.9   AEOS  --   --   --   --   --   --   --   --  0.0 0.2 0.2  --  0.2  --  0.1 0.2   HGB 12.5 11.1* 11.6* 11.1* 12.1 13.3 13.2   < > 12.3 12.2 13.2   < > 12.0   < > 11.4* 10.6*   MCV 99 102*  --  102* 99 100 97   < > 98 97 97   < > 99   < > 99 99    129*  --  89* 99* 153 148*   < > 155 127* 142*   < > 147*   < > 117* 106*    < > = values in this interval not displayed.    , CD4: No lab results found. and HIV RNA: No lab results found., Hepatitis B Testing:   Recent Labs   Lab Test 08/18/19  0123 06/18/18  1024 08/09/17  0819   HBCAB Nonreactive Nonreactive  --    HEPBANG  --  Nonreactive Negative   , Hepatitis C Testing:   Hepatitis C Antibody   Date Value Ref Range Status   08/18/2019 Nonreactive NR^Nonreactive Final     Comment:     Assay performance characteristics have not been established for newborns,   infants, and children     04/18/2019 Nonreactive NR^Nonreactive Final     Comment:     Assay performance characteristics have not been established for newborns,   infants, and children     , TB Quant: No lab results found.    Invalid input(s): \"[id number]\", " "\"[id number]\", Immune Globulin Studies:   Recent Labs   Lab Test 06/18/18  1024   IGG 1,960*      *   , HSV 1/2 IgG: No lab results found., HVS 1/2 PCR:   Recent Labs   Lab Test 09/05/19  1545   HSCSF1 Not Detected   HSCSF2 Not Detected   , VZV PCR:   Recent Labs   Lab Test 09/05/19  1549   VZRES VZV DNA Not Detected, presumed negative for Varicella zoster virus.   , and VZV IgG: No lab results found., Quantitative CMV PCR:   Recent Labs   Lab Test 10/05/19  0539   CMVQNT CMV DNA Not Detected   CMVLOG Not Calculated    and CMV IgG:   Recent Labs   Lab Test 08/17/19  2341 06/18/18  1024   CMVIGG >8.0* >8.0*   , and Quantitative EBV PCR:   Recent Labs   Lab Test 10/05/19  0539   EBRES EBV DNA Not Detected   EBLOG Not Calculated    and EBV EA IgG: No lab results found.                "

## 2025-01-07 NOTE — PATIENT INSTRUCTIONS
It was great to chat with you today. We discussed the following:     Continue your cefadroxil 500 mg twice daily. We will plan for 12 months of antibiotics as long as you aren't having side effects (tentative end: 7/12/2025).   I've included a list of high potassium foods so you can review this. Try to eat low potassium foods for the next 2 weeks.   Repeat your labs in 2 weeks to see where your potassium is. If it remains high, then we will have you see your primary care doctor for further evaluation.   Please let us know if you are having any knee pain or other symptoms.   You are up-to-date on your vaccines. You will need a 6 month COVID-19 booster end of March/beginning of April as per the CDC recommendations.     Follow-up with me in clinic in ~3 months to make sure everything is going OK.

## 2025-01-07 NOTE — LETTER
1/7/2025       RE: Nicci Pemberton  [address] [place] [address]     Dear Colleague,    Thank you for referring your patient, Nicci Pemberton, to the [facility] [place] at [facility]. Please see a copy of my visit note below.    Virtual Visit Details    Type of service:  Video Visit     Originating Location (pt. Location): Home    Distant Location (provider location):  Off-site  Platform used for Video Visit: [facility] [place]  [address] [address]  Phone: [phone]  Fax: [phone]    Patient:  Nicci Pemberton, Date of birth 1960  Date of Visit:  01/07/2025  Referring Provider Andressa Harper MD  Reason for visit: R knee PJI    Recommendations   R knee PJI  Continue cefadroxil 1000 mg BID   Will plan to complete ~12 months of suppressive antibiotic therapy (Tentative end 7/12/2025).    Monthly CBC with diff, CMP while on antibiotics.     Elevated Tbili  Unlikely related to cefadroxil. Have discussed with her primary hepatologist, Dr. Leventhal, who agrees and is monitoring this appropriately.     Mild Hyperkalemia  Reviewed her medication list. Not on any diuretics, ACE/ARB. Is on cyclosporine, which can cause hyperkalemia, although she has been on the same dose for years.  I've provided information about high potassium foods. She will try to cut down her potassium intake.    We will repeat her BMP in 2 weeks to monitor her potassium. If this remains elevated, she should have more extensive work-up through her PCP.     Other  Eosinophilia has now resolved (likely 2/2 long-term CTX)  Will need 6 month COVID-19 booster in ~3/2025    Follow-up with me in 3 months to monitor treatment course.     I spent 49 minutes on the date of the visit reviewing the patient's chart, interpreting laboratory and imaging studies, talking with the  patient, in documentation, and coordinating care.     The longitudinal plan of care for the prosthetic joint infection as documented were addressed during this visit. Due to the added complexity in care, I will continue to support Nicci in the subsequent management and with ongoing continuity of care.    Andressa Harper MD  Transplant Infectious Diseases Attending  [phone]      Assessment   Transplants:  8/18/2019 (Liver); POD  1969.  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 She was recently admitted from 7/12-7/16 with R knee pain. Found to have  Strep agalactiae prosthetic joint infection. We are seeing her today in follow-up from her hospital stay.     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 Developed R knee pain about ~2 days prior to presentation. Source unclear. We are using the debridement, antibiotics, implant retention (DAIR) strategy for management with attempt of infection eradication.     She did have short duration of symptoms (~3 days) prior to debridement and the fact that this is a Strep is a favorable prognosis for eradication.  However, her prosthesis was placed in 2020, which places her at higher risk for treatment failure under the DAIR strategy.     Completed 6 weeks of IV ceftriaxone (7/13/24-8/27/24). Transitioned to amoxicillin-->cefadroxil. Plan to complete 12 months total antibiotics if she is tolerating this (end 7/12/2025).         Hyperbilirubinemia  She does have a history of Gilbert's. Have communicated with her primary hepatologist, Dr. Leventhal, who  "agrees it is unlikely due to her antibiotics. RUQ US (11/6/24) without abnormalities. We did discuss transitioning back to amoxicillin to see if her Tbili decreases, but her Tbili had already started to increase prior to the transition to cefadroxil so we agreed this was less likely to be helpful.     Hyperkalemia  Had mild hyperkalemia (5.2 on 12/10 and 5.3 on 1/6) of unclear etiology. Not taking any diuretics, ACE, or ARB. Is on cyclosporine, which has been associated with hyperkalemia itself and with pseudohypoaldosteronism (although note that she has been hypernatremic and not hyponatremic). I will supply information regarding low potassium foods. We will repeat her potassium in 2 weeks. If still elevated, I will have her see her PCP more expeditiously for work-up.     Transplant Checklist  - QTc interval: 414 (9/2021)  - Pneumocystis prophylaxis: None  - Bacterial prophylaxis: cefadroxil for ongoing treatment of joint infection.   - Fungal prophylaxis: None  - Serostatus & viral prophylaxis: CMV D-/R+, HSV ?, EBV D+/R+ None  - Immunization status: Up-to-date on PCV-20 (2022), RSV (12/2023). Will need Fall COVID-19 booster (already scheduled)  - Gamma globulin status:   Recent Labs   Lab Test 06/18/18  1024   IGG 1,960*     - Antibiotic allergies:   Experienced vertigo and GI symptoms while on ciprofloxacin, cefpodoxime, and linezolid back in 11/2021. Vertigo stopped after discontinuing all antibiotics. Not true allergy. Ciprofloxacin most associated with vertigo.   Had IV infiltrate when give vancomycin in 6/2024. Not a true allergy so de-labeled.      Prior infectious diseases issues   L TKA would infection (10/2021): Underwent I&D (11/10/21). Wound cultures grew E faealis (S amp and vanc), Citrobacter koseri, MRSE, Strep oralis, Anaerococcus, Peptoniphilus asaccharolytic. Treated by orthopedics per documentation by Orthopedic consultation 7/12/2024: \"Keflex x 2 days switched to: Linezolid 600 mg p.o. twice " "daily x10 days, Vantin 200 mg p.o. twice daily x10 days. Then Vantin 200mg PO BID every day x 10days, linezolid 600mg PO BID daily x 10 days.\"   SARS-CoV-2: Has 2 documented infections. One in 12/2020 and one in 5/2021.   Mild Eosinophilia (0.6-0.7): No risk for Strongy/schisto exposure. Likely mild reaction to long-term use of CTX, as it resolved with CTX discontinuation. Will not list as an allergy, since she was able to tolerate ~4 weeks without issue.     Interval Events    Since last seen by me on 10/1/2024, she has been doing well. Did have mild elevation in LFTs/Tbili and underwent RUQ US which was WNL.     Over the past month has also experienced mild hyperkalemia with K 5.3 and 5.2. No changes in medications recently. She is feeling fine. Doesn't eat many high potassium foods.     Has upcoming appointment with Dr. Black on 1/27.       Prior Antibiotics  Cefadroxil: 9/27-current  Amoxicillin: 8/26-9/26  Ceftriaxone: 7/12-8/25/24  Vancomycin: 7/12-7/15    History of Presenting Illness    Her recent R knee PJI history is as follows:   ~7/10/24: Develops R knee pain. Had shaking chills night before knee pain developed with a low-grade temperature of 100.4. Had vomiting and some diarrhea.   7/12/24: R knee arthrocentesis with 60 mL cloudy synovial fluid. WBC 91,760 (96% PMNs). Growth of Strep agalactiae (Group B strep)  7/13/24: Underwent I&D with polyethylene liner exchange. Per op note, \"grossly evident purulent appearing fluid within knee joint\". Vancomycin powder was sprinkled in the joint. Intraoperative culture also grew Strep agalactiae.     Exposure History   Demographics: Lived in [place].    Travel: Has spent time in [place] (stayed at a resort) and [place]. No time in [place]. No other overseas travel. In the [place] traveled around the [place] throughout the country not spending much time in each city. [place] For her job, was on " [place]. [place] for vacation.     Vaccines     Immunization History   Administered Date(s) Administered     COVID-19 12+ (Pfizer) 11/14/2023     COVID-19 Bivalent 18+ (Moderna) 11/07/2022     COVID-19 Monovalent 18+ (Moderna) 03/18/2021, 04/15/2021, 02/15/2022     COVID-19 Monovalent Booster 18+ (Moderna) 07/12/2022     Flu 65+ (Fluad) 10/13/2020     Flu, Unspecified 10/19/2011     Hepatitis B, Adult 09/21/2018, 10/22/2018, 01/17/2019, 04/09/2019, 06/05/2019     Historical DTP/aP 08/26/1965     Influenza (High Dose) Trivalent,PF (Fluzone) 10/21/2019, 09/25/2024     Influenza (IIV3) PF 10/19/2011     Influenza Vaccine 18-64 (Flublok) 02/01/2022, 10/06/2022     Influenza Vaccine >6 months,quad, PF 11/07/2023     Influenza Vaccine, 6+MO IM (QUADRIVALENT W/PRESERVATIVES) 09/21/2018     Pneumococcal 20 valent Conjugate (Prevnar 20) 08/16/2022     Pneumococcal 23 valent 02/01/2005, 10/19/2011, 08/27/2020     RSV Vaccine (Abrysvo) 12/12/2023     RSV Vaccine (Arexvy) 11/01/2023, 12/12/2023     TD,PF 7+ (Tenivac) 10/02/1997     TDAP (Adacel,Boostrix) 09/21/2018     TDAP Vaccine (Adacel) 06/23/2008     TDAP Vaccine (Boostrix) 06/23/2008     Zoster recombinant adjuvanted (SHINGRIX) 08/27/2020, 01/21/2021     Zoster vaccine, live 06/12/2016         Physical Exam   No vital signs taken, as this was a virtual visit   Gen: Alert, oriented. Talking into the camera and sitting in her chair.   HEENT: NC/AT. No scleral icterus noted. Neck supple and moving in all directions.   CV: Appears well-perfused. No cyanosis noted per video visit.   Resp: Breathing comfortably on room air. No increased work of breathing noted.   Skin: No rashes/lesions noted on face or arms in the camera    Data     Data  Laboratory data and imaging listed below was reviewed prior to this encounter.     Microbiology:    Culture   Date Value Ref Range Status   07/13/2024 No anaerobic organisms isolated  Final   07/13/2024 No Growth  Final   07/13/2024 (A)  Final     1+ Streptococcus agalactiae (Group B Streptococcus)     Comment:     Susceptibilities done on previous cultures   07/13/2024 No anaerobic organisms isolated  Final   07/13/2024 No Growth  Final   07/13/2024 (A)  Final    1+ Streptococcus agalactiae (Group B Streptococcus)     Comment:     Susceptibilities done on previous cultures   07/13/2024 No anaerobic organisms isolated  Final   07/13/2024 No Growth  Final   07/13/2024 (A)  Final    1+ Streptococcus agalactiae (Group B Streptococcus)     Comment:     Susceptibilities done on previous cultures   07/12/2024 No Growth  Final   07/12/2024 No Growth  Final   07/12/2024 (A)  Final    2+ Streptococcus agalactiae (Group B Streptococcus)   07/12/2024 No anaerobic organisms isolated  Final   11/10/2021 4+ Anaerococcus species (A)  Final     Comment:     Susceptibilities done on previous cultures   11/10/2021 2+ Peptoniphilus asaccharolyticus (A)  Final   11/10/2021 No Growth  Final   11/10/2021 No Growth  Final   11/10/2021 4+ Citrobacter koseri (A)  Final     Comment:     Susceptibilities done on previous cultures   11/10/2021 4+ Enterococcus faecalis (A)  Final     Comment:     Susceptibilities done on previous cultures   11/10/2021 4+ Streptococcus oralis (A)  Final     Comment:     Susceptibilities done on previous cultures   11/10/2021 4+ Citrobacter koseri (A)  Final     Comment:     Susceptibilities done on previous cultures   11/10/2021 Isolated in broth only Enterococcus faecalis (A)  Final     Comment:     On day 1 of incubation  Susceptibilities done on previous cultures   11/10/2021 4+ Anaerococcus species (A)  Final   11/10/2021 No Growth  Final   11/10/2021 4+ Citrobacter koseri (A)  Final   11/10/2021 4+ Enterococcus faecalis (A)  Final   11/10/2021 4+ Staphylococcus epidermidis (A)  Final   11/10/2021 3+ Streptococcus oralis (A)  Final   10/13/2020 No Growth  Final   08/05/2019 No Growth  Final   08/02/2019   Final    No Salmonella, Shigella, Yersinia,  or Campylobacter.   07/31/2019 No Growth  Final   04/14/2019 Mixture of urogenital organisms  Final   05/04/2018 CITROBACTER KOSERI (A)  Final     Comment:     >100,000 col/ml Citrobacter koseri      Culture   Date Value Ref Range Status   07/13/2024 See corresponding culture for results  Final   07/13/2024 See corresponding culture for results  Final   07/13/2024 See corresponding culture for results  Final   , Inflammatory Markers:   Recent Labs   Lab Test 10/23/24  1023 07/12/24  1003 12/10/20  0844 12/09/20  0637 12/08/20  0511 12/07/20  0559 12/06/20  0504 10/30/20  0649 10/28/20  1603 06/18/18  1024   SED 32* 83*  --   --   --   --   --   --  100* 17   CRP  --   --  51.6* 87.0* 165.0* 210.0* 119.0* 54.7* 108.0* 8.9*   , Hematology Studies:    Recent Labs   Lab Test 09/06/24  1055 07/15/24  0648 07/14/24  0645 07/13/24  0620 07/12/24  1003 05/29/24  1030 02/20/24  1028 04/06/21  1018 03/29/21  1040 03/27/21  0934 03/25/21  1325 01/07/21  1146 12/29/20  1108 12/11/20  0523 12/10/20  0844 12/09/20  0637   WBC 6.5 8.0  --  9.7 14.7* 5.2 4.5   < > 18.4* 4.7 5.4   < > 4.6   < > 5.7 5.9   ANEU  --   --   --   --   --   --   --   --  16.0* 3.1 3.2  --  3.0  --  4.1 3.9   AEOS  --   --   --   --   --   --   --   --  0.0 0.2 0.2  --  0.2  --  0.1 0.2   HGB 12.5 11.1* 11.6* 11.1* 12.1 13.3 13.2   < > 12.3 12.2 13.2   < > 12.0   < > 11.4* 10.6*   MCV 99 102*  --  102* 99 100 97   < > 98 97 97   < > 99   < > 99 99    129*  --  89* 99* 153 148*   < > 155 127* 142*   < > 147*   < > 117* 106*    < > = values in this interval not displayed.    , CD4: No lab results found. and HIV RNA: No lab results found., Hepatitis B Testing:   Recent Labs   Lab Test 08/18/19  0123 06/18/18  1024 08/09/17  0819   HBCAB Nonreactive Nonreactive  --    HEPBANG  --  Nonreactive Negative   , Hepatitis C Testing:   Hepatitis C Antibody   Date Value Ref Range Status   08/18/2019 Nonreactive NR^Nonreactive Final     Comment:     Assay  "performance characteristics have not been established for newborns,   infants, and children     04/18/2019 Nonreactive NR^Nonreactive Final     Comment:     Assay performance characteristics have not been established for newborns,   infants, and children     , TB Quant: No lab results found.    Invalid input(s): \"DKN362NBGF\", \"[id number]\", Immune Globulin Studies:   Recent Labs   Lab Test 06/18/18  1024   IGG 1,960*      *   , HSV 1/2 IgG: No lab results found., HVS 1/2 PCR:   Recent Labs   Lab Test 09/05/19  1545   HSCSF1 Not Detected   HSCSF2 Not Detected   , VZV PCR:   Recent Labs   Lab Test 09/05/19  1549   VZRES VZV DNA Not Detected, presumed negative for Varicella zoster virus.   , and VZV IgG: No lab results found., Quantitative CMV PCR:   Recent Labs   Lab Test 10/05/19  0539   CMVQNT CMV DNA Not Detected   CMVLOG Not Calculated    and CMV IgG:   Recent Labs   Lab Test 08/17/19  2341 06/18/18  1024   CMVIGG >8.0* >8.0*   , and Quantitative EBV PCR:   Recent Labs   Lab Test 10/05/19  0539   EBRES EBV DNA Not Detected   EBLOG Not Calculated    and EBV EA IgG: No lab results found.                  Again, thank you for allowing me to participate in the care of your patient.      Sincerely,    Andressa Harper MD    "

## 2025-01-07 NOTE — NURSING NOTE
Current patient location: [address] [address]    Is the patient currently in the state of [place]? YES    Visit mode:VIDEO    If the visit is dropped, the patient can be reconnected by:VIDEO VISIT: Text to cell phone:   Telephone Information:   Mobile [phone]       Will anyone else be joining the visit? NO  (If patient encounters technical issues they should call [phone]    Are changes needed to the allergy or medication list? Pt stated no med changes    Are refills needed on medications prescribed by this physician? NO    Rooming Documentation:  Not applicable    Reason for visit: RECHECK    No other vitals to report per pt    Pamela JESUSF

## 2025-01-08 ENCOUNTER — TELEPHONE (OUTPATIENT)
Dept: INFECTIOUS DISEASES | Facility: CLINIC | Age: 65
End: 2025-01-08
Payer: COMMERCIAL

## 2025-01-08 DIAGNOSIS — T84.50XD INFECTION OF PROSTHETIC JOINT, SUBSEQUENT ENCOUNTER: Primary | ICD-10-CM

## 2025-01-08 NOTE — TELEPHONE ENCOUNTER
Nicci called with a couple questions.     - she has had 3 doses of COVID vaccine. She is aware the CDC recommends another. Last dose was back in Feb. She said she discussed with Dr. Leventhal who asked her to check in with him prior to receiving her next one. She had labs done weekly after her 3rd vaccine. Message sent to Dr. Leventhal.     - she is also due for pneumococcal vaccine and wonders if she should receive it. I informed her that yes, she should receive her pneumonia vaccine if she is due. Nicci would like me to confirm with our specialty pharmacist and has his contact information.     *ADDENDUM*  Dr. Leventhal reviewed note and is OK with Nicci receiving both vaccines. He would like hepatic panel and CBC every week x4 after COVID vaccine. Nicci notified via [employer] message.              Pt in clinic today for lab. BMP order placed based on last order below. Pt requested fasting lab order. He is not fasting today so he will return. New order placed.     Signed       Hermes Lares MD  11/6/2024  5:47 AM CST Back to Top      Bmp fu as sched    Hermes Lares MD  11/5/2024  8:06 PM CST        Repeat cbc

## 2025-01-08 NOTE — TELEPHONE ENCOUNTER
"Per OPAT lab escalation guidelines, if total bilirubin > 1.5 mg/dL to notify patient's PCP. On 01/06 patient had a bilirubin of 2.0.    Per chart review, virtual visit with Dr. Harper on 01/08, she states:   \"Hyperbilirubinemia  She does have a history of Gilbert's. Have communicated with her primary hepatologist, Dr. Leventhal, who agrees it is unlikely due to her antibiotics. RUQ US (11/6/24) without abnormalities. We did discuss transitioning back to amoxicillin to see if her Tbili decreases, but her Tbili had already started to increase prior to the transition to cefadroxil so we agreed this was less likely to be helpful.\"    No action needed at this time.   "

## 2025-01-12 ENCOUNTER — HEALTH MAINTENANCE LETTER (OUTPATIENT)
Age: 65
End: 2025-01-12

## 2025-01-15 ENCOUNTER — MYC MEDICAL ADVICE (OUTPATIENT)
Dept: ORTHOPEDICS | Facility: CLINIC | Age: 65
End: 2025-01-15
Payer: COMMERCIAL

## 2025-01-16 DIAGNOSIS — Z98.890 S/P DEBRIDEMENT: ICD-10-CM

## 2025-01-16 DIAGNOSIS — Z94.4 LIVER REPLACED BY TRANSPLANT (H): ICD-10-CM

## 2025-01-16 DIAGNOSIS — T84.50XD INFECTION OF PROSTHETIC JOINT, SUBSEQUENT ENCOUNTER: Primary | ICD-10-CM

## 2025-01-26 ASSESSMENT — KOOS JR
TWISING OR PIVOTING ON KNEE: MILD
HOW SEVERE IS YOUR KNEE STIFFNESS AFTER FIRST WAKING IN MORNING: MILD
GOING UP OR DOWN STAIRS: MODERATE
KOOS JR SCORING: 76.33

## 2025-01-27 ENCOUNTER — VIRTUAL VISIT (OUTPATIENT)
Dept: ORTHOPEDICS | Facility: CLINIC | Age: 65
End: 2025-01-27
Payer: COMMERCIAL

## 2025-01-27 DIAGNOSIS — T84.50XD INFECTION OF PROSTHETIC JOINT, SUBSEQUENT ENCOUNTER: Primary | ICD-10-CM

## 2025-01-27 PROCEDURE — 98005 SYNCH AUDIO-VIDEO EST LOW 20: CPT | Performed by: ORTHOPAEDIC SURGERY

## 2025-01-27 NOTE — NURSING NOTE
"Reason For Visit:   Chief Complaint   Patient presents with    RECHECK     Patient is here via telehealth conference for 6 month recheck right knee I&D and right tibial insert exchange.  DOS: 7/13/24.  Patient reports her knee is \"not perfect\" but is getting better all the time.  She does endorse intermittent pain after activity or prolonged sitting.  She still struggles with stairs.  For ADLs, she is very much used to the way her knee feels.  She is also reporting occasional bouts of vertigo for past month, she has not discussed this with any other providers.       There were no vitals taken for this visit.    Pain Assessment  Patient Currently in Pain: Yes  0-10 Pain Scale: 3  Primary Pain Location: Knee    Mc Santos, ATC    "

## 2025-01-27 NOTE — PROGRESS NOTES
Orthopaedic Surgery Clinic Follow-up Appointment (video)    DOS:  10/23/2020, R TKARodrigo.  DOS:  09/24/2021, L TKARodrigo.  DOS:  11/10/2021, I&D superficial wound infection, L TKARodrigo.  DOS:  07/13/2024, DAIR of infected R TKASofia.  Cultures: Strep agalactiae (GBS).    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  She reports that she used doing well, not perfect, but getting better.  She rates her pain as about 3 out of 10 in severity.  She denies any wound drainage, fevers, or chills.  She still struggles with stair descent.  She reports inability to kneel on the right knee or on either side.  She also notes occasional bouts of vertigo for about the past month and is wondering if this could be related to her antibiotic therapy.  She reports she is able to walk as far she wants to walk, though after while with some pain.  She does endorse intermittent pain affectivity or prolonged sitting. Her 01/07/2025 ID note indicates the  plan was to continue cefadroxil 1000 mg po bid, planning to complete approximately 12 months of suppressive antibiotic therapy tentatively ending 07/12/2025, with monthly CBC/diff and CMP while on antibiotics.    Exam:  The right knee incision is visualized by video and appears to be well-healed without any visible drainage.  She demonstrates the ability to perform a straight leg raise repeatedly.    Imaging:  Independent review of imaging was performed including weightbearing right knee radiographs dated 1/21/2025.  Images were compared with previous images and discussed with the patient.  Stable appearing right total knee arthroplasty without any evidence for progressive lucencies.  No evidence for loosening.  No obvious acute fractures.  Decreased soft tissue swelling.    Labs:  I also reviewed recent labs dated 1/21/2025 including a CRP of  0.7 and an ESR of 17, both within the normal range for this laboratories reference range at .  Findings were discussed with the patient.    Impression:  Very pleasant 63yo patient with history of infected R total knee arthroplasty, with clinically quiescent appearing infection after debridement and implant retention.    Plan:  Continue activities as tolerated.  We did discuss the pain with kneeling and that this is a common finding after knee replacement surgery.  We discussed increasing amount of cushioning or avoiding kneeling altogether.  Signs and symptoms to watch out for that should prompt medical attention were discussed.  Otherwise follow-up in June or July the summer 2025 with pre-clinic acute phase reactants and weightbearing right knee radiographs.  All questions this very pleasant patient had at this time were answered.  Time of video call 22 minutes.

## 2025-01-27 NOTE — LETTER
1/27/2025      Nicci Pemberton  [address] [place] [address]      Dear Colleague,    Thank you for referring your patient, Nicci Pemberton, to the [facility] [place]. Please see a copy of my visit note below.    Orthopaedic Surgery Clinic Follow-up Appointment (video)    DOS:  10/23/2020, R TKA, Rodrigo.  DOS:  09/24/2021, L TKA, Rodrigo.  DOS:  11/10/2021, I&D superficial wound infection, L TKARodrigo.  DOS:  07/13/2024, DAIR of infected R TKA, Sofia.  Cultures: Strep agalactiae (GBS).    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  She reports that she used doing well, not perfect, but getting better.  She rates her pain as about 3 out of 10 in severity.  She denies any wound drainage, fevers, or chills.  She still struggles with stair descent.  She reports inability to kneel on the right knee or on either side.  She also notes occasional bouts of vertigo for about the past month and is wondering if this could be related to her antibiotic therapy.  She reports she is able to walk as far she wants to walk, though after while with some pain.  She does endorse intermittent pain affectivity or prolonged sitting. Her 01/07/2025 ID note indicates the  plan was to continue cefadroxil 1000 mg po bid, planning to complete approximately 12 months of suppressive antibiotic therapy tentatively ending 07/12/2025, with monthly CBC/diff and CMP while on antibiotics.    Exam:  The right knee incision is visualized by video and appears to be well-healed without any visible drainage.  She demonstrates the ability to perform a straight leg raise repeatedly.    Imaging:  Independent review of imaging was performed including weightbearing right knee radiographs dated 1/21/2025.  Images were compared with previous images and discussed with the patient.  Stable  appearing right total knee arthroplasty without any evidence for progressive lucencies.  No evidence for loosening.  No obvious acute fractures.  Decreased soft tissue swelling.    Labs:  I also reviewed recent labs dated 1/21/2025 including a CRP of 0.7 and an ESR of 17, both within the normal range for this laboratories reference range at [facility].  Findings were discussed with the patient.    Impression:  Very pleasant 63yo patient with history of infected R total knee arthroplasty, with clinically quiescent appearing infection after debridement and implant retention.    Plan:  Continue activities as tolerated.  We did discuss the pain with kneeling and that this is a common finding after knee replacement surgery.  We discussed increasing amount of cushioning or avoiding kneeling altogether.  Signs and symptoms to watch out for that should prompt medical attention were discussed.  Otherwise follow-up in June or July the summer 2025 with pre-clinic acute phase reactants and weightbearing right knee radiographs.  All questions this very pleasant patient had at this time were answered.  Time of video call 22 minutes.      Again, thank you for allowing me to participate in the care of your patient.        Sincerely,        Mina Black MD    Electronically signed

## 2025-02-03 DIAGNOSIS — T84.50XD INFECTION OF PROSTHETIC JOINT, SUBSEQUENT ENCOUNTER: Primary | ICD-10-CM

## 2025-02-03 DIAGNOSIS — Z94.4 LIVER REPLACED BY TRANSPLANT (H): ICD-10-CM

## 2025-02-03 RX ORDER — CYCLOSPORINE 25 MG/1
CAPSULE, LIQUID FILLED ORAL
Qty: 450 CAPSULE | Refills: 3 | Status: SHIPPED | OUTPATIENT
Start: 2025-02-03

## 2025-02-13 ENCOUNTER — TELEPHONE (OUTPATIENT)
Dept: TRANSPLANT | Facility: CLINIC | Age: 65
End: 2025-02-13
Payer: COMMERCIAL

## 2025-02-13 DIAGNOSIS — T84.50XD INFECTION OF PROSTHETIC JOINT, SUBSEQUENT ENCOUNTER: Primary | ICD-10-CM

## 2025-02-13 NOTE — TELEPHONE ENCOUNTER
Spoke to pt who states that she has been going to [facility]. Lab order was originally faxed to Laura and then through  a conversation they had with pt,  Pt states that she goes to [facility]. Laura faxed the orders to [facility].

## 2025-02-25 ENCOUNTER — TELEPHONE (OUTPATIENT)
Dept: INFECTIOUS DISEASES | Facility: CLINIC | Age: 65
End: 2025-02-25
Payer: COMMERCIAL

## 2025-02-25 DIAGNOSIS — T84.50XD INFECTION OF PROSTHETIC JOINT, SUBSEQUENT ENCOUNTER: Primary | ICD-10-CM

## 2025-02-25 NOTE — TELEPHONE ENCOUNTER
EP called 2/25 to resched 4/15 video follow up with Dr. Harper; provider not avail. Offered 4/8 per provider but pt said she'll be out of town.

## 2025-03-03 ENCOUNTER — TELEPHONE (OUTPATIENT)
Dept: GASTROENTEROLOGY | Facility: CLINIC | Age: 65
End: 2025-03-03
Payer: COMMERCIAL

## 2025-03-03 DIAGNOSIS — T84.50XD INFECTION OF PROSTHETIC JOINT, SUBSEQUENT ENCOUNTER: Primary | ICD-10-CM

## 2025-03-03 NOTE — TELEPHONE ENCOUNTER
Was the patient contacted by phone and reminded of the upcoming visit? Yes    Was the patient instructed to bring a current list of all medications to the appointment or instructed to bring in all medication bottles? Yes, patient verbalized understanding    Were ordered labs and tests completed prior to the appointment? Just had labs drawn at local clinic this morning including cyclosporin per pt.     Melissa Agrawal, ARIADNE  3/3/2025 11:06 AM

## 2025-03-06 ENCOUNTER — VIRTUAL VISIT (OUTPATIENT)
Dept: SURGERY | Facility: CLINIC | Age: 65
End: 2025-03-06
Payer: COMMERCIAL

## 2025-03-06 DIAGNOSIS — E66.01 OBESITY, CLASS III, BMI 40-49.9 (MORBID OBESITY) (H): Primary | ICD-10-CM

## 2025-03-06 NOTE — PATIENT INSTRUCTIONS
Patient Education   Learning About Magnesium  What is magnesium?     Magnesium is a mineral that plays many important roles in your body. For example, magnesium helps keep your blood pressure regular and your heart rhythm steady. It helps build your bones and teeth. When you're sick, magnesium helps your body fight infections. And magnesium helps produce energy for your body to use.  Most adults need 300 to 400 milligrams (mg) of magnesium a day. Magnesium is found in foods, especially nuts, whole grains, dark green vegetables, seafood, and cocoa. It's also available as a supplement. Most people can get enough magnesium through a healthy diet that emphasizes unprocessed foods, including whole grain products.  What are some foods that contain magnesium?  Magnesium is found in many foods in varying amounts. Sometimes the ingredients added to foods make a difference. A plain bagel contains 8 milligrams (mg) of magnesium. But a multi-grain bagel has 69 mg.  This list shows the magnesium levels in a variety of foods and their portion sizes:  Almonds,   cup = 97 mg  Artichoke, 1 = 50 mg  Avocado,   cup = 22 mg  Banana, 1 cup mashed = 61 mg  Baked beans,   cup = 33 mg  Beef, ground, 3 oz = 17 mg  Beef, top sirloin, 3 oz = 20 mg  Bread, wheat bran, 1 oz = 23 mg  Chocolate, dark, 1 oz = 41 mg  Hazelnuts,   cup = 47 mg  Kale, cooked, 1 cup = 23 mg  Lima beans, large,   cup = 41 mg  Potatoes, russet, baked, 1 cup = 90 mg  [place], canned, 3 oz = 27 mg  Spinach, raw, 1 cup = 24 mg  Tuna, canned, 3 oz = 29 mg  Turkey, ground, 3 oz = 21 mg  Who may need extra magnesium?  Some illnesses and medicines may change how much magnesium you can absorb from food. Or they can cause your body to release more magnesium through urine than it should. Examples of these illnesses include Crohn's disease, celiac disease, and type 2 diabetes. People with these conditions may need to increase the amount of magnesium they consume. Your doctor can  tell you if you need more magnesium.  Current as of: September 20, 2023  Content Version: 14.3    2024 [employer].   Care instructions adapted under license by your healthcare professional. If you have questions about a medical condition or this instruction, always ask your healthcare professional. [employer] disclaims any warranty or liability for your use of this information.

## 2025-03-06 NOTE — LETTER
3/6/2025      Nicci Pemberton  [address] [place] [address]      Dear Colleague,    Thank you for referring your patient, Nicci Pemberton, to the [facility] AND [facility] [place]. Please see a copy of my visit note below.    Nicci Pemberton is a 64 year old who is being evaluated via a billable video visit.      How would you like to obtain your AVS? MyChart  If the video visit is dropped, the invitation should be resent by: Text to cell phone: [phone]  Will anyone else be joining your video visit? No -  is in the room        Medical  Weight Loss Follow-Up Diet Evaluation  Assessment:  Nicci is presenting today for a follow up weight management nutrition consultation.  This patient has had an initial appointment and was referred by Dr. Andujar for MNT as treatment for Morbid obesity.  Weight loss medication:  none .   Pt's weight is 227-233 lbs  Initial weight: 229 lbs  Weight change: no change         12/6/2024     8:55 PM   Changes and Difficulties   I have made the following changes to my diet since my last visit: Increase in caloric intake, increased protein, eating yogurt regularly to help avoid gastric issues from long term antibiotic use.   With regards to my diet, I am still struggling with: Consistency, discipline   I have made the following changes to my activity/exercise since my last visit: Decrease due to knee surgery   With regards to my activity/exercise, I am still struggling with: Knee pain, limited mobility     BMI: There is no height or weight on file to calculate BMI.  Ideal body weight: 50.1 kg (110 lb 7.9 oz)  Adjusted ideal body weight: 71.6 kg (157 lb 14.3 oz)    Estimated RMR ([place]-St Jeor equation):   1550 kcals x 1.2 (sedentary) = 1860 kcals (for weight maintenance)  Recommended Protein Intake:  grams of protein/day  Patient Active Problem List:  Patient Active Problem List   Diagnosis     Heterozygous alpha 1-antitrypsin deficiency  (H)     Iron overload     Status post liver transplantation (H)     C. difficile diarrhea     Steroid-induced hyperglycemia     Immunosuppressed status     History of liver recipient (H)     Vertigo     Otitis media     Bile duct stricture (H)     Common bile duct stenosis (H)     Abnormal liver function     Acquired lymphedema of leg     Chronic pain of both knees     Venous hypertension of lower extremity, bilateral     Cramp in lower extremity associated with sleep     Obesity with serious comorbidity     Edema     Hypertension, unspecified type     Gastroesophageal reflux disease without esophagitis     Hyponatremia     Hypothyroidism     Leg swelling     Lymphedema     Macrocytosis without anemia     Malaise     Fatty liver disease, nonalcoholic     Osteoarthritis of both knees, unspecified osteoarthritis type     Osteoarthritis of knee     Peripheral neuropathy     Post-menopausal bleeding     Slow transit constipation     Thrombocytopenia     Vitamin D deficiency     Zinc deficiency     Bilateral edema of lower extremity     Physical debility     Diverticulitis of colon     Suspected 2019 novel coronavirus infection     Morbid obesity (H)     S/P liver transplant (H)     Liver transplant rejection (H)     Chronic kidney disease, stage 3 (H)     Left knee pain     Status post total knee replacement     Incisional infection     Fracture of bone of left shoulder     Other hyperlipidemia     Septic arthritis (H)     Septic arthritis of knee, right (H)     Pain and swelling of right knee     Infection of prosthetic joint     Post-operative state     Diverticulosis of intestine, part unspecified, without perforation or abscess without bleeding     History of total bilateral knee replacement     Other specified disorders of bone density and structure, right thigh       Progress on goals from last visit: Patient reports she started and stopped the Naltrexone and Wellbutrin d/t feeling very ill after starting them.  Noted she is practicing portion control to the best of her ability. Noted she will indulge in pleasure foods (over the holidays) that will interfere with weight gain/loss. Reports over the last few weeks she is getting on her elliptical for physical activity. Reports she tracks her calories via hemanth targeting 1,300kcal and 80-90g protein.  Noted eating out with family has increased d/t family events.  -Is not walking outside d/t fear of falling.   - using spray margin in place of spread able butter   - labs reviewed and look stable, low Mg    Look into taking a probiotic tablet as she is on long term antibiotic - not met d/t not feeling necessary at this time   Use smaller plates to support portion control - going ok per patient  Keep carb serving to 25% of plate. - this is also something she continues to work on  Wanting to monitor carb intake - calculated about 50-55g carbs per meal- this is something she is targeting via tracking. Noted it helps to track this number.           Dietary Recall:  NO: apples and fruits with core and pits   Breakfast: egg omelet with ham and fresh fruit (berries, bananas) Or greek yogurt (low fat) with berries OR magic spoon cereal   Lunch: Or deli lunch meat with cream cheese Cottage cheese with salad   Dinner: fish (salmon, tilapia, shrimp), chicken, pork with salad  Snack: protein shake, mixed nuts, popcorn   Beverages:   water (60-70oz)  Gatorade Zero     Exercise: elliptical when she can..not walking outside d/t weather at this time     Nutrition Diagnosis:    Morbid obesity related to excessive energy intake as evidence by patient subjective report and BMI of 42.07        Intervention:  Food and/or nutrient delivery: consistency with meals. Protein and fiber goals at each meal.   Nutrition education: magnesium containing foods   Nutrition counseling: continued support and goal setting      Monitoring/Evaluation:    Goals:  Continue practicing portion control via myplate  method.  Continue tracking calories and protein intake       Patient to follow up in 2 month(s) with bariatrician and 4 month(s) with RD    Handouts:  Magnesium information       Video-Visit Details    Type of service:  Video Visit    Video Start Time (time video started): 11:33 AM    Video End Time (time video stopped): 11:55 PM    Originating Location (pt. Location): Home      Distant Location (provider location):  Off-site    Mode of Communication:  Video Conference via [facility]    Physician has received verbal consent for a Video Visit from the patient? Yes      Anamaria Gardner RD             Again, thank you for allowing me to participate in the care of your patient.        Sincerely,        Anamaria Gardner RD    Electronically signed

## 2025-03-06 NOTE — PROGRESS NOTES
Nicci Pemberton is a 64 year old who is being evaluated via a billable video visit.      How would you like to obtain your AVS? MyChart  If the video visit is dropped, the invitation should be resent by: Text to cell phone: [phone]  Will anyone else be joining your video visit? No -  is in the room        Medical  Weight Loss Follow-Up Diet Evaluation  Assessment:  Nicci is presenting today for a follow up weight management nutrition consultation.  This patient has had an initial appointment and was referred by Dr. Andujar for MNT as treatment for Morbid obesity.  Weight loss medication:  none .   Pt's weight is 227-233 lbs  Initial weight: 229 lbs  Weight change: no change         12/6/2024     8:55 PM   Changes and Difficulties   I have made the following changes to my diet since my last visit: Increase in caloric intake, increased protein, eating yogurt regularly to help avoid gastric issues from long term antibiotic use.   With regards to my diet, I am still struggling with: Consistency, discipline   I have made the following changes to my activity/exercise since my last visit: Decrease due to knee surgery   With regards to my activity/exercise, I am still struggling with: Knee pain, limited mobility     BMI: There is no height or weight on file to calculate BMI.  Ideal body weight: 50.1 kg (110 lb 7.9 oz)  Adjusted ideal body weight: 71.6 kg (157 lb 14.3 oz)    Estimated RMR ([place]-St Jeor equation):   1550 kcals x 1.2 (sedentary) = 1860 kcals (for weight maintenance)  Recommended Protein Intake:  grams of protein/day  Patient Active Problem List:  Patient Active Problem List   Diagnosis    Heterozygous alpha 1-antitrypsin deficiency (H)    Iron overload    Status post liver transplantation (H)    C. difficile diarrhea    Steroid-induced hyperglycemia    Immunosuppressed status    History of liver recipient (H)    Vertigo    Otitis media    Bile duct stricture (H)    Common bile duct  stenosis (H)    Abnormal liver function    Acquired lymphedema of leg    Chronic pain of both knees    Venous hypertension of lower extremity, bilateral    Cramp in lower extremity associated with sleep    Obesity with serious comorbidity    Edema    Hypertension, unspecified type    Gastroesophageal reflux disease without esophagitis    Hyponatremia    Hypothyroidism    Leg swelling    Lymphedema    Macrocytosis without anemia    Malaise    Fatty liver disease, nonalcoholic    Osteoarthritis of both knees, unspecified osteoarthritis type    Osteoarthritis of knee    Peripheral neuropathy    Post-menopausal bleeding    Slow transit constipation    Thrombocytopenia    Vitamin D deficiency    Zinc deficiency    Bilateral edema of lower extremity    Physical debility    Diverticulitis of colon    Suspected 2019 novel coronavirus infection    Morbid obesity (H)    S/P liver transplant (H)    Liver transplant rejection (H)    Chronic kidney disease, stage 3 (H)    Left knee pain    Status post total knee replacement    Incisional infection    Fracture of bone of left shoulder    Other hyperlipidemia    Septic arthritis (H)    Septic arthritis of knee, right (H)    Pain and swelling of right knee    Infection of prosthetic joint    Post-operative state    Diverticulosis of intestine, part unspecified, without perforation or abscess without bleeding    History of total bilateral knee replacement    Other specified disorders of bone density and structure, right thigh       Progress on goals from last visit: Patient reports she started and stopped the Naltrexone and Wellbutrin d/t feeling very ill after starting them. Noted she is practicing portion control to the best of her ability. Noted she will indulge in pleasure foods (over the holidays) that will interfere with weight gain/loss. Reports over the last few weeks she is getting on her elliptical for physical activity. Reports she tracks her calories via hemanth targeting  1,300kcal and 80-90g protein.  Noted eating out with family has increased d/t family events.  -Is not walking outside d/t fear of falling.   - using spray margin in place of spread able butter   - labs reviewed and look stable, low Mg    Look into taking a probiotic tablet as she is on long term antibiotic - not met d/t not feeling necessary at this time   Use smaller plates to support portion control - going ok per patient  Keep carb serving to 25% of plate. - this is also something she continues to work on  Wanting to monitor carb intake - calculated about 50-55g carbs per meal- this is something she is targeting via tracking. Noted it helps to track this number.           Dietary Recall:  NO: apples and fruits with core and pits   Breakfast: egg omelet with ham and fresh fruit (berries, bananas) Or greek yogurt (low fat) with berries OR magic spoon cereal   Lunch: Or deli lunch meat with cream cheese Cottage cheese with salad   Dinner: fish (salmon, tilapia, shrimp), chicken, pork with salad  Snack: protein shake, mixed nuts, popcorn   Beverages:   water (60-70oz)  Gatorade Zero     Exercise: elliptical when she can..not walking outside d/t weather at this time     Nutrition Diagnosis:    Morbid obesity related to excessive energy intake as evidence by patient subjective report and BMI of 42.07        Intervention:  Food and/or nutrient delivery: consistency with meals. Protein and fiber goals at each meal.   Nutrition education: magnesium containing foods   Nutrition counseling: continued support and goal setting      Monitoring/Evaluation:    Goals:  Continue practicing portion control via myplate method.  Continue tracking calories and protein intake       Patient to follow up in 2 month(s) with bariatrician and 4 month(s) with RD    Handouts:  Magnesium information       Video-Visit Details    Type of service:  Video Visit    Video Start Time (time video started): 11:33 AM    Video End Time (time video  stopped): 11:55 PM    Originating Location (pt. Location): Home      Distant Location (provider location):  Off-site    Mode of Communication:  Video Conference via [facility]    Physician has received verbal consent for a Video Visit from the patient? Yes      Anamaria Gardner RD

## 2025-03-10 ENCOUNTER — OFFICE VISIT (OUTPATIENT)
Dept: GASTROENTEROLOGY | Facility: CLINIC | Age: 65
End: 2025-03-10
Attending: INTERNAL MEDICINE
Payer: COMMERCIAL

## 2025-03-10 VITALS
DIASTOLIC BLOOD PRESSURE: 83 MMHG | TEMPERATURE: 98.3 F | WEIGHT: 231 LBS | BODY MASS INDEX: 42.51 KG/M2 | OXYGEN SATURATION: 97 % | HEIGHT: 62 IN | HEART RATE: 90 BPM | SYSTOLIC BLOOD PRESSURE: 125 MMHG

## 2025-03-10 DIAGNOSIS — Z94.4: ICD-10-CM

## 2025-03-10 DIAGNOSIS — T84.50XD INFECTION OF PROSTHETIC JOINT, SUBSEQUENT ENCOUNTER: Primary | ICD-10-CM

## 2025-03-10 DIAGNOSIS — N18.2 CKD (CHRONIC KIDNEY DISEASE) STAGE 2, GFR 60-89 ML/MIN: ICD-10-CM

## 2025-03-10 DIAGNOSIS — D84.9 IMMUNOSUPPRESSED STATUS: Chronic | ICD-10-CM

## 2025-03-10 PROCEDURE — 3079F DIAST BP 80-89 MM HG: CPT | Performed by: INTERNAL MEDICINE

## 2025-03-10 PROCEDURE — 99215 OFFICE O/P EST HI 40 MIN: CPT | Performed by: INTERNAL MEDICINE

## 2025-03-10 PROCEDURE — 3074F SYST BP LT 130 MM HG: CPT | Performed by: INTERNAL MEDICINE

## 2025-03-10 PROCEDURE — G2211 COMPLEX E/M VISIT ADD ON: HCPCS | Performed by: INTERNAL MEDICINE

## 2025-03-10 PROCEDURE — 1125F AMNT PAIN NOTED PAIN PRSNT: CPT | Performed by: INTERNAL MEDICINE

## 2025-03-10 PROCEDURE — 99213 OFFICE O/P EST LOW 20 MIN: CPT | Performed by: INTERNAL MEDICINE

## 2025-03-10 ASSESSMENT — PAIN SCALES - GENERAL: PAINLEVEL_OUTOF10: MILD PAIN (3)

## 2025-03-10 NOTE — LETTER
3/10/2025      Nicci Pemberton  [address] [place] [address]      Dear Colleague,    Thank you for referring your patient, Nicci Pemberton, to the [facility] [place]. Please see a copy of my visit note below.    Date of Service: March 10, 2025     Subjective:          Nicci Pemberton is a 64 year old female presenting for follow up with history of liver transplantation    History of Present Illness   Nicci Pemberton is a 64 year old female with past medical history of obesity, lymphedema, and cirrhosis secondary to combination of nonalcoholic fatty liver disease, iron overload, and alpha-1 antitrypsin MZ phenotype who is s/p  donor liver transplant on 19 and presents in follow up.      Since last seen, she was unfortunately diagnosed with a right knee prosthetic joint infection in 2024.  This required a surgical washout and she has been on suppressive antibiotics plan to continue through 2025.  She had very overt mobility issues early on her treatment course, and does note that things are slowly improving.  However, her limits in mobility have impacted her ability to make lifestyle modifications to promote weight loss.    She has been seen by her primary provider as well as a registered dietitian with regards to avenues for weight loss.  She has tried naltrexone and Wellbutrin, and felt quite poorly on these medications.  It has been recommended that she consider a trial of semaglutide, but she has concerns about injectable medications in the setting of her infection.  She is very dedicated to making lifestyle modifications and sees a path forward with this, but is open to further treatments.    She denies any other overt health issues.  Denies any issues with memory or concentration.  No overt changes in her bowel habits.    She does note that she is a new grandma, and that she lives approximately 1 hour away from her son    She has had issues with ACR in the  past, and has been weaned off of her MMF, and remains on cyclosporine alone    Surgical Course  - OLT date: 2019   - etiology: ANGULO/alpha 1  - donor: type DBD  - Induction IS: basiliximab  - CMV status D+/R+  - operation: Surgical technique caval replacement, biliary anastomosis: duct to duct  - CiT 335min, WiT 35min  - Episodes of Rejection:  moderate acute cellular rejection (MARVIN 5/9) noted on biopsy from 2021  - Biliary complications: high grade stricture s/p multiple ERCPs (last 2021)  - Other complications of immediate post-operative course: delerium with d/c of tacrolimus and start of cyclosporin    Past Medical History:  Past Medical History:   Diagnosis Date     Anemia      Cellulitis      Cirrhosis of liver (H) 2018    cirrhosis secondary to combination of nonalcoholic fatty liver disease, iron overload, and alpha-1 antitrypsin MZ phenotype who is s/p  donor liver transplant on 19     Gastroesophageal reflux disease      Heterozygous alpha 1-antitrypsin deficiency (H)      High hepatic iron concentration determined by biopsy of liver      Incisional infection 2021    Left TKA incision     Liver cirrhosis secondary to ANGULO (H)      Liver transplanted (H) 2019     Lymphedema      Osteoarthritis     knees     Other chronic pain     Left knee     SBP (spontaneous bacterial peritonitis) (H) 2019 in [place]     Thrombocytopenia 2020     Thyroid disease     Hypothyroid       Past Surgical History:  Past Surgical History:   Procedure Laterality Date     ARTHROPLASTY KNEE Right 10/23/2020    Procedure: Right  total knee arthroplasty;  Surgeon: Mario Wallace MD;  [facility]     ARTHROPLASTY KNEE Left 2021    Procedure: Left total knee arthroplasty;  Surgeon: Mario Wallace MD;  [facility]     BENCH LIVER N/A 2019    Procedure: BACKBENCH PREPARATION, LIVER;  Surgeon: Mandeep Alford MD;  [facility]     BIOPSY  Bone  marrow 2018    Liver ~ 1980; 2021     CHOLECYSTECTOMY  8/18/2019    Removed during liver transplant     COLONOSCOPY N/A 06/22/2018    Procedure: COLONOSCOPY;  colonoscopy;  Surgeon: Hugo Patel MD;  [facility]     ENDOSCOPIC RETROGRADE CHOLANGIOPANCREATOGRAM N/A 02/10/2020    Procedure: ENDOSCOPIC RETROGRADE CHOLANGIOPANCREATOGRAPHY with spinchterotomy;  Surgeon: Guru Rigoberto Canada MD;  [facility]     ENDOSCOPIC RETROGRADE CHOLANGIOPANCREATOGRAM N/A 05/13/2020    Procedure: ENDOSCOPIC RETROGRADE CHOLANGIOPANCREATOGRAPHY,Stent exchange and sludge removal;  Surgeon: Guru Rigoberto Canada MD;  [facility]     ENDOSCOPIC RETROGRADE CHOLANGIOPANCREATOGRAM N/A 02/24/2021    Procedure: ENDOSCOPIC RETROGRADE CHOLANGIOPANCREATOGRAPHY, SPINCTEROTOMY, DEBRIDE REMOVAL, STENT PLACEMENT;  Surgeon: Guru Rigoberto Canada MD;  [facility]     ENDOSCOPIC RETROGRADE CHOLANGIOPANCREATOGRAPHY, EXCHANGE TUBE/STENT N/A 03/04/2020    Procedure: ENDOSCOPIC RETROGRADE CHOLANGIOPANCREATOGRAPHY, WITH biliary dilarion, stent exchange, stone removal;  Surgeon: Guru Rigobreto Canada MD;  [facility]     ENDOSCOPIC RETROGRADE CHOLANGIOPANCREATOGRAPHY, EXCHANGE TUBE/STENT N/A 05/12/2021    Procedure: ENDOSCOPIC RETROGRADE CHOLANGIOPANCREATOGRAPHY WITH BILIARY STENT EXCHANGE, DILATION AND SLUDGE/STONE REMOVAL;  Surgeon: Guru Rigoberto Canada MD;  [facility]     ESOPHAGOSCOPY, GASTROSCOPY, DUODENOSCOPY (EGD), COMBINED N/A 10/31/2019    Procedure: Esophagogastroduodenoscopy, With Biopsy;  Surgeon: Nerissa Aranda MD;  [facility]     EXCHANGE POLY COMPONENT ARTHROPLASTY KNEE Right 7/13/2024    Procedure: Right tibial insert exchange;  Surgeon: Mina Black MD;  [facility]     IR FEEDING TUBE PLACEMENT W FLUORO/MD  09/06/2019     IR FLUORO 0-1 HOUR  09/05/2019     IR LIVER BIOPSY PERCUTANEOUS  03/25/2021     IR LUMBAR  PUNCTURE  2019     IRRIGATION AND DEBRIDEMENT LOWER EXTREMITY, COMBINED Left 11/10/2021    Procedure: Irrigation and debridement Left knee skin subcutaneous tissue (length: 10cm), Application of wound vac;  Surgeon: Mario Wallace MD;  [facility]     IRRIGATION AND DEBRIDEMENT LOWER EXTREMITY, COMBINED Right 2024    Procedure: Irrigation and debridement right total knee arthroplasty;  Surgeon: Mina Black MD;  [facility]     KNEE SURGERY  10/23/2020     DC STOMACH SURGERY PROCEDURE UNLISTED  Liver transplant 19     TRANSPLANT LIVER RECIPIENT  DONOR N/A 2019    Procedure: TRANSPLANT, LIVER, RECIPIENT,  DONOR;  Surgeon: Mandeep Alford MD;  [facility]     US PARACENTESIS  2019     US PARACENTESIS WITH ALBUMIN  2019       Past Social History:  Social History     Tobacco Use     Smoking status: Former     Current packs/day: 0.00     Average packs/day: 0.5 packs/day for 11.4 years (5.7 ttl pk-yrs)     Types: Cigarettes     Start date: 2000     Quit date: 10/3/2011     Years since quittin.4     Passive exposure: Past (per pt)     Smokeless tobacco: Never   Substance Use Topics     Alcohol use: Not Currently     Comment: Former weekend social drinker;  no use since      Drug use: Not Currently     Types: Marijuana     Comment: Teen years        Family History:  Family History   Problem Relation Age of Onset     Diabetes Maternal Grandfather         Diagnosed at age 83     Mental Illness Maternal Grandfather         Alzheimers     Hypertension Mother      Osteoporosis Mother      Restless Leg Syndrome Father      Mental Illness Father         Arnie Body Disease     Mental Illness Sister         Borderline Schizophrenia     Alcoholism Brother      Diabetes Brother         Diagnosed at age 63     Hyperlipidemia Brother      Obesity Brother      No Known Problems Son      Heart Failure Paternal Grandmother      No Known Problems Son       "No Known Problems Maternal Grandmother      Breast Cancer Paternal Aunt      Breast Cancer Other         Maternal Aunt (diagnosed age 50+)     Breast Cancer Other         Paternal Aunt (diagnosed age 50+)     Cirrhosis No family hx of      Liver Cancer No family hx of        Review of Systems  A complete 10 point review of systems was asked and answered in the negative unless specifically commented upon in the HPI    Current Outpatient Medications   Medication Sig Dispense Refill     acetaminophen (TYLENOL) 325 MG tablet Take 2 tablets (650 mg) by mouth every 4 hours as needed for other 60 tablet 0     cefadroxil (DURICEF) 500 MG capsule Take 2 capsules (1,000 mg) by mouth 2 times daily. 360 capsule 3     COMPRESSION STOCKINGS 2 each every 12 hours 2 Product 1     cycloSPORINE modified (GENERIC EQUIVALENT) 25 MG capsule Take 3 capsules (75 mg) by mouth every morning AND 2 capsules (50 mg) every evening. 450 capsule 3     famotidine (PEPCID) 20 MG tablet Take 1 tablet (20 mg) by mouth 2 times daily 60 tablet 3     levothyroxine (SYNTHROID/LEVOTHROID) 112 MCG tablet Take 112.5 mcg by mouth every morning       rosuvastatin (CRESTOR) 5 MG tablet Take 1 tablet (5 mg) by mouth daily 90 tablet 0     No current facility-administered medications for this visit.       Objective:         Vitals:    03/10/25 1026   BP: 125/83   Pulse: 90   Temp: 98.3  F (36.8  C)   TempSrc: Oral   SpO2: 97%   Weight: 104.8 kg (231 lb)   Height: 1.575 m (5' 2\")     Body mass index is 42.25 kg/m .     Physical Exam    Vitals reviewed.   Constitutional: Well-developed, well-nourished, in no apparent distress.    HEENT: Normocephalic.   Neck/Lymph: Normal ROM, supple.  Respiratory: Normal respiratory excursion   GI:  Abdomen soft, obese  Skin:  Skin is warm and dry. No rash noted.  Musculoskeletal:  ROM intact, normal muscle bulk    Psychiatric: Normal mood and affect. Behavior is normal.  Neuro: no tremor, A&Ox3    Labs and Diagnostic " tests:  Reviewed in Care Everywhere    Assessment and Plan:    S/P Liver Transplantation:   - 8/18/2019 with DBD donor for ANGULO/A1AT/iron overload  - Bi-caval with duct to duct  - Episode of moderate ACR 3/21; treated  - History of biliary stricture; last ERCP 5/2021  -She has excellent allograft function at this time  -She does have [place] syndrome, with a persistently elevated total and indirect bilirubin; there is no pathology associated with this condition  - Post- transplant labs per routine    Steatotic Liver Disease (SLD, formerly referred to as Non-alcoholic Fatty Liver Disease)  - I had a long discussion with the patient about metabolic dysfunction-associated steatotic liver disease (MASLD).  We discussed how fat deposits in the liver, how this leads to inflammation, and how chronic inflammation (metabolic dysfunction-associated steatohepatitis/MASH) can ultimately lead to scarring and cirrhosis.  The long-term complications of fatty liver disease were discussed with the patient, including the increased risk of cardiovascular disease complications,the risk of developing diabetes (if not already), and variable progression to cirrhosis and end stage liver disease.  - It is important to note that liver tests alone as a screening test for ANGULO are not sensitive, as up to 20-30% of patients can have ANGULO with fibrosis despite having normal liver chemistries.     Management of MASLD/MASH  - We spent time discussing necessary lifestyle modifications including: appropriate diet, exercise and weight loss plan.      - Recommend exercise regularly: at least 4 times per week, with a minimum of 40-45 minutes per day.      - It has shown that patients who exercise regularly can have improvement of insulin resistance, increase in baseline metabolic activity and resolution of fatty liver disease (even if  appreciable weight loss does not occur)    - Recommend a low-carbohydrate, low-calorie diet  - An ideal weight loss  plan would be to lose 7-10% of body weight over the next six months.  This has been proven to resolve fat and inflammation in the liver, and can lead to regression of scarring that might be present  - If the patient has diabetes, we recommend assertive management: ideal goal Hgb A1c goal of =/< 7%.     - There are no overt contraindications to medications used in the treatment of diabetes in persons with chronic liver disease  - Management of cholesterol is also very important.    - The use of statins are an effective means of therapy and are not contra-indicated in those with abnormal liver tests OR those with cirrhosis.  The value of these medications in this population far outweigh the minor risks of abnormal liver tests.   - Goal LDL in those with MASH are < 100 mg/dL  - Consider the utility of liberalizing coffee consumption as some data that this may slow progression and reverse effects of ANGULO-related fibrosis.  - There are no contraindications to prescription weight loss medications from a hepatology perspective    Immunosuppression:   - Currently on cyclosporine 75/50mg with goal trough   - Will plan to check immunosuppression trough levels per protocol to assess for adequate target dosing and will assess CBC and kidney function for toxicity of medications    Kidney Health:   - has developed mild CKD stage II  - Will continue to minimize CNI as able    Left knee prosthetic infection s/p washout and antibiotics thru 7/25  - Has close follow up with Ortho and ID    Nutrition/Weight:    It is very common for patients to put on significant weight after liver transplantation, as they become conditioned to eating as much as possible to maintain a healthy weight pre-transplant.  There is a very significant risk of developing fatty liver and non-alcoholic steatohepatitis in post-transplant patients, even in those who were not transplanted for ANGULO cirrhosis.  - Please work on eating a diet that is rice in  fresh fruits and vegetables, moderate amounts of lean protein and dairy, and modest amounts of carbohydrates and fats.  - An exercise plan/regimen is an essential part of remaining healthy in the post-transplant setting and we recommend 30-35 minutes of exercise at least 4 days a week    Routine Health Care:  - You need to establish and maintain care with a primary care physician to manage: hypertension, high cholesterol, abnormal blood sugars - as these are all potential complications of your health after liver transplantation and will need a local physician to manage these issues.  - All patients with liver diseaes (even post transplant) are at an increased risk for osteoporosis.  We strongly recommend screening for Vitamin D deficiency at least twice yearly with aggressive supplementation/replacement as indicated.    - We also recommend a screening DEXA scan to evaluate for osteoporosis.  If present, should treat with calcium, Vitamin D supplementation, and recommend consideration of bisphosphonate therapy.  Also recommend follow up DEXA scans to evaluate for improvement of bone density on therapy.  - Recommend yearly skin exams with dermatology, and if skin cancers are found, recommend twice yearly exams  - Recommend you stay up to date for cancer screening: mammograms/PAP for women and prostate cancer screening for men, and colon cancer screening for all.  - Practice good hygiene with washing of hands and maintaining a clean living space as this will decrease your chances of developing an infection in the post-transplantation setting  - Eat a health diet: rich in fresh fruits and vegetables, lean proteins, and minimize carbohydrate and fat intake.      Follow up: 12 months     Thank you very much for the opportunity to participate in the care of this patient.  If you have any further questions, please don't hesitate to contact our office.    __________________________________  Thomas M. Leventhal,  M.D.   of Medicine  Advanced & Transplant Hepatology  [facility]    I spent 40 minutes on the date of the encounter doing chart review, history and exam, documentation and further activities as noted above.      The longitudinal plan of care for liver transplantation and immunosuppression management (and possible complications) was addressed during this visit. Due to the added complexity in care, I will continue to support this patient in the subsequent management of this condition(s) and with the ongoing continuity of care of this condition      Again, thank you for allowing me to participate in the care of your patient.        Sincerely,        Thomas M. Leventhal, MD    Electronically signed

## 2025-03-10 NOTE — PROGRESS NOTES
Date of Service: March 10, 2025     Subjective:          Nicci Pemberton is a 64 year old female presenting for follow up with history of liver transplantation    History of Present Illness   Nicci Pemberton is a 64 year old female with past medical history of obesity, lymphedema, and cirrhosis secondary to combination of nonalcoholic fatty liver disease, iron overload, and alpha-1 antitrypsin MZ phenotype who is s/p  donor liver transplant on 19 and presents in follow up.      Since last seen, she was unfortunately diagnosed with a right knee prosthetic joint infection in 2024.  This required a surgical washout and she has been on suppressive antibiotics plan to continue through 2025.  She had very overt mobility issues early on her treatment course, and does note that things are slowly improving.  However, her limits in mobility have impacted her ability to make lifestyle modifications to promote weight loss.    She has been seen by her primary provider as well as a registered dietitian with regards to avenues for weight loss.  She has tried naltrexone and Wellbutrin, and felt quite poorly on these medications.  It has been recommended that she consider a trial of semaglutide, but she has concerns about injectable medications in the setting of her infection.  She is very dedicated to making lifestyle modifications and sees a path forward with this, but is open to further treatments.    She denies any other overt health issues.  Denies any issues with memory or concentration.  No overt changes in her bowel habits.    She does note that she is a new grandma, and that she lives approximately 1 hour away from her son    She has had issues with ACR in the past, and has been weaned off of her MMF, and remains on cyclosporine alone    Surgical Course  - OLT date: 2019   - etiology: ANGULO/alpha 1  - donor: type DBD  - Induction IS: basiliximab  - CMV status D+/R+  - operation: Surgical technique  caval replacement, biliary anastomosis: duct to duct  - CiT 335min, WiT 35min  - Episodes of Rejection:  moderate acute cellular rejection (MARVIN 5/9) noted on biopsy from 2021  - Biliary complications: high grade stricture s/p multiple ERCPs (last 2021)  - Other complications of immediate post-operative course: delerium with d/c of tacrolimus and start of cyclosporin    Past Medical History:  Past Medical History:   Diagnosis Date    Anemia     Cellulitis     Cirrhosis of liver (H) 2018    cirrhosis secondary to combination of nonalcoholic fatty liver disease, iron overload, and alpha-1 antitrypsin MZ phenotype who is s/p  donor liver transplant on 19    Gastroesophageal reflux disease     Heterozygous alpha 1-antitrypsin deficiency (H)     High hepatic iron concentration determined by biopsy of liver     Incisional infection 2021    Left TKA incision    Liver cirrhosis secondary to ANGULO (H)     Liver transplanted (H) 2019    Lymphedema     Osteoarthritis     knees    Other chronic pain     Left knee    SBP (spontaneous bacterial peritonitis) (H) 2019 in [place]    Thrombocytopenia 2020    Thyroid disease     Hypothyroid       Past Surgical History:  Past Surgical History:   Procedure Laterality Date    ARTHROPLASTY KNEE Right 10/23/2020    Procedure: Right  total knee arthroplasty;  Surgeon: Mario Wallace MD;  [facility]    ARTHROPLASTY KNEE Left 2021    Procedure: Left total knee arthroplasty;  Surgeon: Mario Wallace MD;  [facility]    BENCH LIVER N/A 2019    Procedure: BACKBENCH PREPARATION, LIVER;  Surgeon: Mandeep Alford MD;  [facility]    BIOPSY  Bone marrow 2018    Liver ~ 2021    CHOLECYSTECTOMY  2019    Removed during liver transplant    COLONOSCOPY N/A 2018    Procedure: COLONOSCOPY;  colonoscopy;  Surgeon: Hugo Patel MD;  [facility]    ENDOSCOPIC RETROGRADE CHOLANGIOPANCREATOGRAM  N/A 02/10/2020    Procedure: ENDOSCOPIC RETROGRADE CHOLANGIOPANCREATOGRAPHY with spinchterotomy;  Surgeon: Guru Rigoberto Canada MD;  [facility]    ENDOSCOPIC RETROGRADE CHOLANGIOPANCREATOGRAM N/A 05/13/2020    Procedure: ENDOSCOPIC RETROGRADE CHOLANGIOPANCREATOGRAPHY,Stent exchange and sludge removal;  Surgeon: Guru Rigoberto Canada MD;  [facility]    ENDOSCOPIC RETROGRADE CHOLANGIOPANCREATOGRAM N/A 02/24/2021    Procedure: ENDOSCOPIC RETROGRADE CHOLANGIOPANCREATOGRAPHY, SPINCTEROTOMY, DEBRIDE REMOVAL, STENT PLACEMENT;  Surgeon: Guru Rigoberto Canada MD;  [facility]    ENDOSCOPIC RETROGRADE CHOLANGIOPANCREATOGRAPHY, EXCHANGE TUBE/STENT N/A 03/04/2020    Procedure: ENDOSCOPIC RETROGRADE CHOLANGIOPANCREATOGRAPHY, WITH biliary dilarion, stent exchange, stone removal;  Surgeon: Guru Rigoberto Canada MD;  [facility]    ENDOSCOPIC RETROGRADE CHOLANGIOPANCREATOGRAPHY, EXCHANGE TUBE/STENT N/A 05/12/2021    Procedure: ENDOSCOPIC RETROGRADE CHOLANGIOPANCREATOGRAPHY WITH BILIARY STENT EXCHANGE, DILATION AND SLUDGE/STONE REMOVAL;  Surgeon: Guru Rigoberto Canada MD;  [facility]    ESOPHAGOSCOPY, GASTROSCOPY, DUODENOSCOPY (EGD), COMBINED N/A 10/31/2019    Procedure: Esophagogastroduodenoscopy, With Biopsy;  Surgeon: Nerissa Aranda MD;  [facility]    EXCHANGE POLY COMPONENT ARTHROPLASTY KNEE Right 7/13/2024    Procedure: Right tibial insert exchange;  Surgeon: Mina Black MD;  [facility]    IR FEEDING TUBE PLACEMENT W FLUORO/MD  09/06/2019    IR FLUORO 0-1 HOUR  09/05/2019    IR LIVER BIOPSY PERCUTANEOUS  03/25/2021    IR LUMBAR PUNCTURE  09/05/2019    IRRIGATION AND DEBRIDEMENT LOWER EXTREMITY, COMBINED Left 11/10/2021    Procedure: Irrigation and debridement Left knee skin subcutaneous tissue (length: 10cm), Application of wound vac;  Surgeon: Mario Wallace MD;  [facility]    IRRIGATION AND  DEBRIDEMENT LOWER EXTREMITY, COMBINED Right 2024    Procedure: Irrigation and debridement right total knee arthroplasty;  Surgeon: Mina Black MD;  [facility]    KNEE SURGERY  10/23/2020    MS STOMACH SURGERY PROCEDURE UNLISTED  Liver transplant 19    TRANSPLANT LIVER RECIPIENT  DONOR N/A 2019    Procedure: TRANSPLANT, LIVER, RECIPIENT,  DONOR;  Surgeon: Mandeep Alford MD;  [facility]    US PARACENTESIS  2019    US PARACENTESIS WITH ALBUMIN  2019       Past Social History:  Social History     Tobacco Use    Smoking status: Former     Current packs/day: 0.00     Average packs/day: 0.5 packs/day for 11.4 years (5.7 ttl pk-yrs)     Types: Cigarettes     Start date: 2000     Quit date: 10/3/2011     Years since quittin.4     Passive exposure: Past (per pt)    Smokeless tobacco: Never   Substance Use Topics    Alcohol use: Not Currently     Comment: Former weekend social drinker;  no use since     Drug use: Not Currently     Types: Marijuana     Comment: Teen years        Family History:  Family History   Problem Relation Age of Onset    Diabetes Maternal Grandfather         Diagnosed at age 83    Mental Illness Maternal Grandfather         Alzheimers    Hypertension Mother     Osteoporosis Mother     Restless Leg Syndrome Father     Mental Illness Father         Arnie Body Disease    Mental Illness Sister         Borderline Schizophrenia    Alcoholism Brother     Diabetes Brother         Diagnosed at age 63    Hyperlipidemia Brother     Obesity Brother     No Known Problems Son     Heart Failure Paternal Grandmother     No Known Problems Son     No Known Problems Maternal Grandmother     Breast Cancer Paternal Aunt     Breast Cancer Other         Maternal Aunt (diagnosed age 50+)    Breast Cancer Other         Paternal Aunt (diagnosed age 50+)    Cirrhosis No family hx of     Liver Cancer No family hx of        Review of Systems  A complete 10  "point review of systems was asked and answered in the negative unless specifically commented upon in the HPI    Current Outpatient Medications   Medication Sig Dispense Refill    acetaminophen (TYLENOL) 325 MG tablet Take 2 tablets (650 mg) by mouth every 4 hours as needed for other 60 tablet 0    cefadroxil (DURICEF) 500 MG capsule Take 2 capsules (1,000 mg) by mouth 2 times daily. 360 capsule 3    COMPRESSION STOCKINGS 2 each every 12 hours 2 Product 1    cycloSPORINE modified (GENERIC EQUIVALENT) 25 MG capsule Take 3 capsules (75 mg) by mouth every morning AND 2 capsules (50 mg) every evening. 450 capsule 3    famotidine (PEPCID) 20 MG tablet Take 1 tablet (20 mg) by mouth 2 times daily 60 tablet 3    levothyroxine (SYNTHROID/LEVOTHROID) 112 MCG tablet Take 112.5 mcg by mouth every morning      rosuvastatin (CRESTOR) 5 MG tablet Take 1 tablet (5 mg) by mouth daily 90 tablet 0     No current facility-administered medications for this visit.       Objective:         Vitals:    03/10/25 1026   BP: 125/83   Pulse: 90   Temp: 98.3  F (36.8  C)   TempSrc: Oral   SpO2: 97%   Weight: 104.8 kg (231 lb)   Height: 1.575 m (5' 2\")     Body mass index is 42.25 kg/m .     Physical Exam    Vitals reviewed.   Constitutional: Well-developed, well-nourished, in no apparent distress.    HEENT: Normocephalic.   Neck/Lymph: Normal ROM, supple.  Respiratory: Normal respiratory excursion   GI:  Abdomen soft, obese  Skin:  Skin is warm and dry. No rash noted.  Musculoskeletal:  ROM intact, normal muscle bulk    Psychiatric: Normal mood and affect. Behavior is normal.  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  We discussed how fat deposits in the liver, how this leads to inflammation, and how chronic inflammation (metabolic dysfunction-associated steatohepatitis/MASH) can ultimately lead to scarring and cirrhosis.  The long-term complications of fatty liver disease were discussed with the patient, including the increased risk of cardiovascular disease complications,the risk of developing diabetes (if not already), and variable progression to cirrhosis and end stage liver disease.  - It is important to note that liver tests alone as a screening test for ANGULO are not sensitive, as up to 20-30% of patients can have ANGULO with fibrosis despite having normal liver chemistries.     Management of MASLD/MASH  - We spent time discussing necessary lifestyle modifications including: appropriate diet, exercise and weight loss plan.      - Recommend exercise regularly: at least 4 times per week, with a minimum of 40-45 minutes per day.      - It has shown that patients who exercise regularly can have improvement of insulin resistance, increase in baseline metabolic activity and resolution of fatty liver disease (even if  appreciable weight loss does not occur)    - Recommend a low-carbohydrate, low-calorie diet  - An ideal weight loss plan would be to lose 7-10% of body weight over the next six months.  This has been proven to resolve fat and inflammation in the liver, and can lead to regression of scarring that might be present  - If the patient has diabetes, we recommend assertive management: ideal goal Hgb A1c goal of =/< 7%.     - There are no overt  contraindications to medications used in the treatment of diabetes in persons with chronic liver disease  - Management of cholesterol is also very important.    - The use of statins are an effective means of therapy and are not contra-indicated in those with abnormal liver tests OR those with cirrhosis.  The value of these medications in this population far outweigh the minor risks of abnormal liver tests.   - Goal LDL in those with MASH are < 100 mg/dL  - Consider the utility of liberalizing coffee consumption as some data that this may slow progression and reverse effects of ANGULO-related fibrosis.  - There are no contraindications to prescription weight loss medications from a hepatology perspective    Immunosuppression:   - Currently on cyclosporine 75/50mg with goal trough   - Will plan to check immunosuppression trough levels per protocol to assess for adequate target dosing and will assess CBC and kidney function for toxicity of medications    Kidney Health:   - has developed mild CKD stage II  - Will continue to minimize CNI as able    Left knee prosthetic infection s/p washout and antibiotics thru 7/25  - Has close follow up with Ortho and ID    Nutrition/Weight:    It is very common for patients to put on significant weight after liver transplantation, as they become conditioned to eating as much as possible to maintain a healthy weight pre-transplant.  There is a very significant risk of developing fatty liver and non-alcoholic steatohepatitis in post-transplant patients, even in those who were not transplanted for ANGULO cirrhosis.  - Please work on eating a diet that is rice in fresh fruits and vegetables, moderate amounts of lean protein and dairy, and modest amounts of carbohydrates and fats.  - An exercise plan/regimen is an essential part of remaining healthy in the post-transplant setting and we recommend 30-35 minutes of exercise at least 4 days a week    Routine Health Care:  - You need to  establish and maintain care with a primary care physician to manage: hypertension, high cholesterol, abnormal blood sugars - as these are all potential complications of your health after liver transplantation and will need a local physician to manage these issues.  - All patients with liver diseaes (even post transplant) are at an increased risk for osteoporosis.  We strongly recommend screening for Vitamin D deficiency at least twice yearly with aggressive supplementation/replacement as indicated.    - We also recommend a screening DEXA scan to evaluate for osteoporosis.  If present, should treat with calcium, Vitamin D supplementation, and recommend consideration of bisphosphonate therapy.  Also recommend follow up DEXA scans to evaluate for improvement of bone density on therapy.  - Recommend yearly skin exams with dermatology, and if skin cancers are found, recommend twice yearly exams  - Recommend you stay up to date for cancer screening: mammograms/PAP for women and prostate cancer screening for men, and colon cancer screening for all.  - Practice good hygiene with washing of hands and maintaining a clean living space as this will decrease your chances of developing an infection in the post-transplantation setting  - Eat a health diet: rich in fresh fruits and vegetables, lean proteins, and minimize carbohydrate and fat intake.      Follow up: 12 months     Thank you very much for the opportunity to participate in the care of this patient.  If you have any further questions, please don't hesitate to contact our office.    __________________________________  Thomas M. Leventhal, M.D.   of Medicine  Advanced & Transplant Hepatology  [facility]    I spent 40 minutes on the date of the encounter doing chart review, history and exam, documentation and further activities as noted above.      The longitudinal plan of care for liver transplantation and immunosuppression  management (and possible complications) was addressed during this visit. Due to the added complexity in care, I will continue to support this patient in the subsequent management of this condition(s) and with the ongoing continuity of care of this condition

## 2025-03-10 NOTE — PATIENT INSTRUCTIONS
"- for yogurt, use ones that have \"live cultures\" for the pro-biotic effect  (there is [employer] or [facility] Provisions)    - Labs every 3 months    - I dont have concerns about medications like Wegovy or Ozempic from a liver perspective  "

## 2025-03-10 NOTE — NURSING NOTE
"Chief Complaint   Patient presents with    RECHECK     Follow Up     /83   Pulse 90   Temp 98.3  F (36.8  C) (Oral)   Ht 1.575 m (5' 2\")   Wt 104.8 kg (231 lb)   SpO2 97%   BMI 42.25 kg/m    Allyn Harvey on 3/10/2025 at 10:28 AM    "

## 2025-05-28 ENCOUNTER — MYC MEDICAL ADVICE (OUTPATIENT)
Dept: INFECTIOUS DISEASES | Facility: CLINIC | Age: 65
End: 2025-05-28
Payer: COMMERCIAL

## 2025-05-28 DIAGNOSIS — T84.50XD INFECTION OF PROSTHETIC JOINT, SUBSEQUENT ENCOUNTER: Primary | ICD-10-CM

## 2025-06-16 ENCOUNTER — TELEPHONE (OUTPATIENT)
Dept: INFECTIOUS DISEASES | Facility: CLINIC | Age: 65
End: 2025-06-16
Payer: COMMERCIAL

## 2025-06-16 DIAGNOSIS — T84.50XD INFECTION OF PROSTHETIC JOINT, SUBSEQUENT ENCOUNTER: Primary | ICD-10-CM

## 2025-06-16 NOTE — TELEPHONE ENCOUNTER
OPAT episode resolved, completed 08/2024.    Shala Hazel, BSN, RN  RN Care Coordinator [facility]

## 2025-06-17 ENCOUNTER — VIRTUAL VISIT (OUTPATIENT)
Dept: INFECTIOUS DISEASES | Facility: CLINIC | Age: 65
End: 2025-06-17
Attending: INTERNAL MEDICINE
Payer: MEDICARE

## 2025-06-17 VITALS — HEIGHT: 62 IN | WEIGHT: 235 LBS | BODY MASS INDEX: 43.24 KG/M2

## 2025-06-17 DIAGNOSIS — Z94.4 STATUS POST LIVER TRANSPLANTATION (H): ICD-10-CM

## 2025-06-17 DIAGNOSIS — T84.50XD INFECTION OF PROSTHETIC JOINT, SUBSEQUENT ENCOUNTER: Primary | ICD-10-CM

## 2025-06-17 PROCEDURE — 1126F AMNT PAIN NOTED NONE PRSNT: CPT | Performed by: INTERNAL MEDICINE

## 2025-06-17 PROCEDURE — G2211 COMPLEX E/M VISIT ADD ON: HCPCS | Performed by: INTERNAL MEDICINE

## 2025-06-17 PROCEDURE — 98007 SYNCH AUDIO-VIDEO EST HI 40: CPT | Performed by: INTERNAL MEDICINE

## 2025-06-17 RX ORDER — CEFADROXIL 500 MG/1
1000 CAPSULE ORAL 2 TIMES DAILY
Qty: 360 CAPSULE | Refills: 3 | Status: SHIPPED | OUTPATIENT
Start: 2025-06-17

## 2025-06-17 ASSESSMENT — PAIN SCALES - GENERAL: PAINLEVEL_OUTOF10: NO PAIN (0)

## 2025-06-17 NOTE — PROGRESS NOTES
Virtual Visit Details    Type of service:  Video Visit   Video Start Time: 1:04 PM  Video End Time:1:32 PM    Originating Location (pt. Location): Home    Distant Location (provider location):  Off-site  Platform used for Video Visit: [facility] [address] [address]  Phone: [phone]  Fax: [phone]    Patient:  Nicci Pemberton, Date of birth 1960  Date of Visit:  06/17/2025  Referring Provider Andressa Harper MD  Reason for visit: R knee PJI    Recommendations   R knee PJI  Continue cefadroxil 1000 mg BID indefinitely   We had a discussion today about duration of suppressive antibiotics. From the literature, at least 12 months is ideal. A majority of people with Strep infections do not relapse. However, there is still some risk of relapse if we discontinue antibiotics. She is tolerating antibiotics well and would prefer to stay on these for now. I think this is reasonable. We will plan to readdress every 6 months to determine whether she would like to continue this vs discontinue the antibiotics.   I provided her with refills for the next 6 months   CBC with diff, CMP every 2 months while on antibiotics     Elevated Tbili  Unlikely related to cefadroxil. Have discussed with her primary hepatologist, Dr. Leventhal, who agrees and is monitoring this appropriately.     Infection Prevention  She is interested in getting her measles serology to ensure durable immunity. I agree this is reasonable and have sent for titers.     Other  Will need her 6 month COVID-19 booster whenever she can  We also discussed getting Twinrix, as she has not had HAV vaccine before and HBV vaccination pre-transplant did not confer durable immunity. She will get this at her local pharmacy when she is able.     Follow-up with me in 6 months to monitor treatment course.     I spent 50 minutes on the date of the visit reviewing the patient's  chart, interpreting laboratory and imaging studies, talking with the patient, in documentation, and coordinating care.     The longitudinal plan of care for the prosthetic joint infection as documented were addressed during this visit. Due to the added complexity in care, I will continue to support Nicci in the subsequent management and with ongoing continuity of care.    Andressa Harper MD  Transplant Infectious Diseases Attending  [phone]      Assessment   Transplants:  8/18/2019 (Liver); POD  2130.  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 She was recently admitted from 7/12-7/16 with R knee pain. Found to have  Strep agalactiae prosthetic joint infection. We are seeing her today in follow-up from her hospital stay.     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 Developed R knee pain about ~2 days prior to presentation. Source unclear. We are using the debridement, antibiotics, implant retention (DAIR) strategy for management with attempt of infection eradication.     She did have short duration of symptoms (~3 days) prior to debridement and the fact that this is a Strep is a favorable prognosis for eradication.  However, her prosthesis was placed in 2020, which places her at higher risk for treatment failure under the DAIR strategy.     Completed 6 weeks of IV ceftriaxone (7/13/24-8/27/24). Transitioned to amoxicillin-->cefadroxil. She will complete 12 months of total antibiotics on 7/12/2025. After some shared decision making, we decided to continue suppressive antibiotics and  "readdress this every 6 months. She is tolerating the antibiotics well and without side effects.         Hyperbilirubinemia  She does have a history of Gilbert's. Have communicated with her primary hepatologist, Dr. Leventhal, who agrees it is unlikely due to her antibiotics. RUQ US (11/6/24) without abnormalities. We did discuss transitioning back to amoxicillin to see if her Tbili decreases, but her Tbili had already started to increase prior to the transition to cefadroxil so we agreed this was less likely to be helpful.     Hyperkalemia, resolved  Had mild hyperkalemia (5.2 on 12/10 and 5.3 on 1/6) of unclear etiology. This resolved on its own.     Transplant Checklist  - QTc interval: 414 (9/2021)  - Pneumocystis prophylaxis: None  - Bacterial prophylaxis: cefadroxil for ongoing treatment of joint infection.   - Fungal prophylaxis: None  - Serostatus & viral prophylaxis: CMV D-/R+, HSV ?, EBV D+/R+ None  - Immunization status: Up-to-date on PCV-20 (2022), RSV (12/2023). Due for Spring COVID-19. Can also get Twinrix.   - Gamma globulin status:   Recent Labs   Lab Test 06/18/18  1024   IGG 1,960*     - Antibiotic allergies:   Experienced vertigo and GI symptoms while on ciprofloxacin, cefpodoxime, and linezolid back in 11/2021. Vertigo stopped after discontinuing all antibiotics. Not true allergy. Ciprofloxacin most associated with vertigo.   Had IV infiltrate when give vancomycin in 6/2024. Not a true allergy so de-labeled.      Prior infectious diseases issues   L TKA would infection (10/2021): Underwent I&D (11/10/21). Wound cultures grew E faealis (S amp and vanc), Citrobacter koseri, MRSE, Strep oralis, Anaerococcus, Peptoniphilus asaccharolytic. Treated by orthopedics per documentation by Orthopedic consultation 7/12/2024: \"Keflex x 2 days switched to: Linezolid 600 mg p.o. twice daily x10 days, Vantin 200 mg p.o. twice daily x10 days. Then Vantin 200mg PO BID every day x 10days, linezolid 600mg PO BID daily " "x 10 days.\"   SARS-CoV-2: Has 2 documented infections. One in 12/2020 and one in 5/2021.   Mild Eosinophilia (0.6-0.7): No risk for Strongy/schisto exposure. Likely mild reaction to long-term use of CTX, as it resolved with CTX discontinuation. Will not list as an allergy, since she was able to tolerate ~4 weeks without issue.     Interval Events    Since last seen by me in January, she had an appointment with Dr. Leventhal who was happy with her current liver numbers. She has no other complaints or concerns today.     Prior Antibiotics  Cefadroxil: 9/27-current  Amoxicillin: 8/26-9/26  Ceftriaxone: 7/12-8/25/24  Vancomycin: 7/12-7/15    History of Presenting Illness    Her recent R knee PJI history is as follows:   ~7/10/24: Develops R knee pain. Had shaking chills night before knee pain developed with a low-grade temperature of 100.4. Had vomiting and some diarrhea.   7/12/24: R knee arthrocentesis with 60 mL cloudy synovial fluid. WBC 91,760 (96% PMNs). Growth of Strep agalactiae (Group B strep)  7/13/24: Underwent I&D with polyethylene liner exchange. Per op note, \"grossly evident purulent appearing fluid within knee joint\". Vancomycin powder was sprinkled in the joint. Intraoperative culture also grew Strep agalactiae.     Exposure History   Demographics: Lived in [place].    Travel: Has spent time in [place] (stayed at a resort) and [place]. No time in [place]. No other overseas travel. In the [place] traveled around the [place] throughout the country not spending much time in each city. [place] travel[place] For her job, was on [email or web address] for vacation.     Vaccines     Immunization History   Administered Date(s) Administered    COVID-19 12+ (Pfizer) 11/14/2023, 02/19/2025    COVID-19 Bivalent 18+ (Moderna) 11/07/2022    COVID-19 Monovalent 18+ (Moderna) 03/18/2021, 04/15/2021, 02/15/2022    COVID-19 Monovalent Booster 18+ (Moderna) 07/12/2022    " Flu 65+ (Fluad) 10/13/2020    Flu, Unspecified 10/19/2011    Hepatitis B, Adult (Energix-B/Recombivax HB) 09/21/2018, 10/22/2018, 01/17/2019, 04/09/2019, 06/05/2019    Historical DTP/aP 08/26/1965    Influenza (High Dose) Trivalent,PF (Fluzone) 10/21/2019, 09/25/2024    Influenza (IIV3) PF 10/19/2011    Influenza Vaccine 18-64 (Flublok) 02/01/2022, 10/06/2022    Influenza Vaccine >6 months,quad, PF 11/07/2023    Influenza Vaccine, 6+MO IM (QUADRIVALENT W/PRESERVATIVES) 09/21/2018    Pneumococcal 20 valent Conjugate (Prevnar 20) 08/16/2022    Pneumococcal 23 valent 02/01/2005, 10/19/2011, 08/27/2020    RSV (Abrysvo) 12/12/2023    RSV Vaccine (Arexvy) 11/01/2023, 12/12/2023    TD,PF 7+ (Tenivac) 10/02/1997    TDAP (Adacel,Boostrix) 09/21/2018    TDAP Vaccine (Adacel) 06/23/2008    TDAP Vaccine (Boostrix) 06/23/2008    Zoster recombinant adjuvanted (Shingrix) 08/27/2020, 01/21/2021    Zoster vaccine, live 06/12/2016         Physical Exam   No vital signs taken, as this was a virtual visit   Gen: Alert, oriented. Talking into the camera and sitting in her chair in her living room.  HEENT: NC/AT. No scleral icterus noted. Neck supple and moving in all directions.   CV: Appears well-perfused. No cyanosis noted per video visit.   Resp: Breathing comfortably on room air. No increased work of breathing noted.   Skin: No rashes/lesions noted on face or arms in the camera    Data     Data   Laboratory data and imaging listed below was reviewed prior to this encounter.     Microbiology:    Culture   Date Value Ref Range Status   07/13/2024 No anaerobic organisms isolated  Final   07/13/2024 No Growth  Final   07/13/2024 (A)  Final    1+ Streptococcus agalactiae (Group B Streptococcus)     Comment:     Susceptibilities done on previous cultures   07/13/2024 No anaerobic organisms isolated  Final   07/13/2024 No Growth  Final   07/13/2024 (A)  Final    1+ Streptococcus agalactiae (Group B Streptococcus)     Comment:      Susceptibilities done on previous cultures   07/13/2024 No anaerobic organisms isolated  Final   07/13/2024 No Growth  Final   07/13/2024 (A)  Final    1+ Streptococcus agalactiae (Group B Streptococcus)     Comment:     Susceptibilities done on previous cultures   07/12/2024 No Growth  Final   07/12/2024 No Growth  Final   07/12/2024 (A)  Final    2+ Streptococcus agalactiae (Group B Streptococcus)   07/12/2024 No anaerobic organisms isolated  Final   11/10/2021 4+ Anaerococcus species (A)  Final     Comment:     Susceptibilities done on previous cultures   11/10/2021 2+ Peptoniphilus asaccharolyticus (A)  Final   11/10/2021 No Growth  Final   11/10/2021 No Growth  Final   11/10/2021 4+ Citrobacter koseri (A)  Final     Comment:     Susceptibilities done on previous cultures   11/10/2021 4+ Enterococcus faecalis (A)  Final     Comment:     Susceptibilities done on previous cultures   11/10/2021 4+ Streptococcus oralis (A)  Final     Comment:     Susceptibilities done on previous cultures   11/10/2021 4+ Citrobacter koseri (A)  Final     Comment:     Susceptibilities done on previous cultures   11/10/2021 Isolated in broth only Enterococcus faecalis (A)  Final     Comment:     On day 1 of incubation  Susceptibilities done on previous cultures   11/10/2021 4+ Anaerococcus species (A)  Final   11/10/2021 No Growth  Final   11/10/2021 4+ Citrobacter koseri (A)  Final   11/10/2021 4+ Enterococcus faecalis (A)  Final   11/10/2021 4+ Staphylococcus epidermidis (A)  Final   11/10/2021 3+ Streptococcus oralis (A)  Final   10/13/2020 No Growth  Final   08/05/2019 No Growth  Final   08/02/2019   Final    No Salmonella, Shigella, Yersinia, or Campylobacter.   07/31/2019 No Growth  Final   04/14/2019 Mixture of urogenital organisms  Final   05/04/2018 CITROBACTER KOSERI (A)  Final     Comment:     >100,000 col/ml Citrobacter koseri     GS Culture   Date Value Ref Range Status   07/13/2024 See corresponding culture for results   "Final   07/13/2024 See corresponding culture for results  Final   07/13/2024 See corresponding culture for results  Final   , Inflammatory Markers:   Recent Labs   Lab Test 10/23/24  1023 07/12/24  1003 12/10/20  0844 12/09/20  0637 12/08/20  0511 12/07/20  0559 12/06/20  0504 10/30/20  0649 10/28/20  1603 06/18/18  1024   SED 32* 83*  --   --   --   --   --   --  100* 17   CRP  --   --  51.6* 87.0* 165.0* 210.0* 119.0* 54.7* 108.0* 8.9*   , Hematology Studies:    Recent Labs   Lab Test 09/06/24  1055 07/15/24  0648 07/14/24  0645 07/13/24  0620 07/12/24  1003 05/29/24  1030 02/20/24  1028 09/28/21  0534 09/26/21  0607 04/06/21  1018 03/29/21  1040 03/27/21  0934   WBC 6.5 8.0  --  9.7 14.7* 5.2 4.5   < > 10.2   < > 18.4* 4.7   ANEU 4.2  --   --  7.7 12.4*  --   --   --  7.9  --  16.0* 3.1   AEOS 0.4  --   --  0.1 0.0  --   --   --  0.1  --  0.0 0.2   HGB 12.5 11.1* 11.6* 11.1* 12.1 13.3 13.2   < > 10.6*   < > 12.3 12.2   MCV 99 102*  --  102* 99 100 97   < > 99   < > 98 97    129*  --  89* 99* 153 148*   < > 102*   < > 155 127*    < > = values in this interval not displayed.    , CD4: No lab results found. and HIV RNA: No lab results found., Hepatitis B Testing:   Recent Labs   Lab Test 08/18/19  0123 06/18/18  1024 08/09/17  0819   HBCAB Nonreactive Nonreactive  --    HEPBANG  --  Nonreactive Negative   , Hepatitis C Testing:   Hepatitis C Antibody   Date Value Ref Range Status   08/18/2019 Nonreactive NR^Nonreactive Final     Comment:     Assay performance characteristics have not been established for newborns,   infants, and children     04/18/2019 Nonreactive NR^Nonreactive Final     Comment:     Assay performance characteristics have not been established for newborns,   infants, and children     , TB Quant: No lab results found.    Invalid input(s): \"[id number]\", \"[id number]\", Immune Globulin Studies:   Recent Labs   Lab Test 06/18/18  1024   IGG 1,960*      *   , HSV 1/2 IgG: No lab " results found., HVS 1/2 PCR:   Recent Labs   Lab Test 09/05/19  1545   HSCSF1 Not Detected   HSCSF2 Not Detected   , VZV PCR:   Recent Labs   Lab Test 09/05/19  1549   VZRES VZV DNA Not Detected, presumed negative for Varicella zoster virus.   , and VZV IgG: No lab results found., Quantitative CMV PCR:   Recent Labs   Lab Test 10/05/19  0539   CMVQNT CMV DNA Not Detected   CMVLOG Not Calculated    and CMV IgG:   Recent Labs   Lab Test 08/17/19  2341 06/18/18  1024   CMVIGG >8.0* >8.0*   , and Quantitative EBV PCR:   Recent Labs   Lab Test 10/05/19  0539   EBRES EBV DNA Not Detected   EBLOG Not Calculated    and EBV EA IgG: No lab results found.

## 2025-06-17 NOTE — Clinical Note
Can you assist with faxing her labs to her local laboratory? There should be CBC, CMP, and measles titers.   Thanks!  Andressa

## 2025-06-17 NOTE — LETTER
6/17/2025       RE: Nicci Pemberton  [address] [place] [address]     Dear Colleague,    Thank you for referring your patient, Nicci Pemberton, to the [facility] [place] at [facility]. Please see a copy of my visit note below.    Virtual Visit Details    Type of service:  Video Visit   Video Start Time: 1:04 PM  Video End Time:1:32 PM    Originating Location (pt. Location): Home    Distant Location (provider location):  Off-site  Platform used for Video Visit: [facility] [place]  [address] [facility] [address]  Phone: [phone]  Fax: [phone]    Patient:  Nicci Pemberton, Date of birth 1960  Date of Visit:  06/17/2025  Referring Provider Andressa Harper MD  Reason for visit: R knee PJI    Recommendations   R knee PJI  Continue cefadroxil 1000 mg BID indefinitely   We had a discussion today about duration of suppressive antibiotics. From the literature, at least 12 months is ideal. A majority of people with Strep infections do not relapse. However, there is still some risk of relapse if we discontinue antibiotics. She is tolerating antibiotics well and would prefer to stay on these for now. I think this is reasonable. We will plan to readdress every 6 months to determine whether she would like to continue this vs discontinue the antibiotics.   I provided her with refills for the next 6 months   CBC with diff, CMP every 2 months while on antibiotics     Elevated Tbili  Unlikely related to cefadroxil. Have discussed with her primary hepatologist, Dr. Leventhal, who agrees and is monitoring this appropriately.     Infection Prevention  She is interested in getting her measles serology to ensure durable immunity. I agree this is reasonable and have sent for titers.     Other  Will need her 6 month COVID-19 booster whenever she can  We also discussed  getting Twinrix, as she has not had HAV vaccine before and HBV vaccination pre-transplant did not confer durable immunity. She will get this at her local pharmacy when she is able.     Follow-up with me in 6 months to monitor treatment course.     I spent 50 minutes on the date of the visit reviewing the patient's chart, interpreting laboratory and imaging studies, talking with the patient, in documentation, and coordinating care.     The longitudinal plan of care for the prosthetic joint infection as documented were addressed during this visit. Due to the added complexity in care, I will continue to support Nicci in the subsequent management and with ongoing continuity of care.    Andressa Harper MD  Transplant Infectious Diseases Attending  [phone]      Assessment   Transplants:  8/18/2019 (Liver); POD  2130.  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 She was recently admitted from 7/12-7/16 with R knee pain. Found to have  Strep agalactiae prosthetic joint infection. We are seeing her today in follow-up from her hospital stay.     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 Developed R knee pain about ~2 days prior to presentation. Source unclear. We are using the debridement, antibiotics, implant retention (DAIR) strategy for management with attempt of infection eradication.     She did have short duration of symptoms (~3 days) prior to debridement and the fact that this is a Strep is a favorable prognosis for eradication.  However, her prosthesis was placed in 2020, which places her  at higher risk for treatment failure under the DAIR strategy.     Completed 6 weeks of IV ceftriaxone (7/13/24-8/27/24). Transitioned to amoxicillin-->cefadroxil. She will complete 12 months of total antibiotics on 7/12/2025. After some shared decision making, we decided to continue suppressive antibiotics and readdress this every 6 months. She is tolerating the antibiotics well and without side effects.         Hyperbilirubinemia  She does have a history of Gilbert's. Have communicated with her primary hepatologist, Dr. Leventhal, who agrees it is unlikely due to her antibiotics. RUQ US (11/6/24) without abnormalities. We did discuss transitioning back to amoxicillin to see if her Tbili decreases, but her Tbili had already started to increase prior to the transition to cefadroxil so we agreed this was less likely to be helpful.     Hyperkalemia, resolved  Had mild hyperkalemia (5.2 on 12/10 and 5.3 on 1/6) of unclear etiology. This resolved on its own.     Transplant Checklist  - QTc interval: 414 (9/2021)  - Pneumocystis prophylaxis: None  - Bacterial prophylaxis: cefadroxil for ongoing treatment of joint infection.   - Fungal prophylaxis: None  - Serostatus & viral prophylaxis: CMV D-/R+, HSV ?, EBV D+/R+ None  - Immunization status: Up-to-date on PCV-20 (2022), RSV (12/2023). Due for Spring COVID-19. Can also get Twinrix.   - Gamma globulin status:   Recent Labs   Lab Test 06/18/18  1024   IGG 1,960*     - Antibiotic allergies:   Experienced vertigo and GI symptoms while on ciprofloxacin, cefpodoxime, and linezolid back in 11/2021. Vertigo stopped after discontinuing all antibiotics. Not true allergy. Ciprofloxacin most associated with vertigo.   Had IV infiltrate when give vancomycin in 6/2024. Not a true allergy so de-labeled.      Prior infectious diseases issues   L TKA would infection (10/2021): Underwent I&D (11/10/21). Wound cultures grew E faealis (S amp and vanc), Citrobacter koseri, MRSE, Strep oralis,  "Anaerococcus, Peptoniphilus asaccharolytic. Treated by orthopedics per documentation by Orthopedic consultation 7/12/2024: \"Keflex x 2 days switched to: Linezolid 600 mg p.o. twice daily x10 days, Vantin 200 mg p.o. twice daily x10 days. Then Vantin 200mg PO BID every day x 10days, linezolid 600mg PO BID daily x 10 days.\"   SARS-CoV-2: Has 2 documented infections. One in 12/2020 and one in 5/2021.   Mild Eosinophilia (0.6-0.7): No risk for Strongy/schisto exposure. Likely mild reaction to long-term use of CTX, as it resolved with CTX discontinuation. Will not list as an allergy, since she was able to tolerate ~4 weeks without issue.     Interval Events    Since last seen by me in January, she had an appointment with Dr. Leventhal who was happy with her current liver numbers. She has no other complaints or concerns today.     Prior Antibiotics  Cefadroxil: 9/27-current  Amoxicillin: 8/26-9/26  Ceftriaxone: 7/12-8/25/24  Vancomycin: 7/12-7/15    History of Presenting Illness    Her recent R knee PJI history is as follows:   ~7/10/24: Develops R knee pain. Had shaking chills night before knee pain developed with a low-grade temperature of 100.4. Had vomiting and some diarrhea.   7/12/24: R knee arthrocentesis with 60 mL cloudy synovial fluid. WBC 91,760 (96% PMNs). Growth of Strep agalactiae (Group B strep)  7/13/24: Underwent I&D with polyethylene liner exchange. Per op note, \"grossly evident purulent appearing fluid within knee joint\". Vancomycin powder was sprinkled in the joint. Intraoperative culture also grew Strep agalactiae.     Exposure History   Demographics: Lived in [place].    Travel: Has spent time in [place] (stayed at a resort) and [place]. No time in [place]. No other overseas travel. In the [place] has traveled around the [place] throughout the country not spending much time in each city. [place] travel[place] For her job, was on [place] for " vacation.     Vaccines     Immunization History   Administered Date(s) Administered     COVID-19 12+ (Pfizer) 11/14/2023, 02/19/2025     COVID-19 Bivalent 18+ (Moderna) 11/07/2022     COVID-19 Monovalent 18+ (Moderna) 03/18/2021, 04/15/2021, 02/15/2022     COVID-19 Monovalent Booster 18+ (Moderna) 07/12/2022     Flu 65+ (Fluad) 10/13/2020     Flu, Unspecified 10/19/2011     Hepatitis B, Adult (Energix-B/Recombivax HB) 09/21/2018, 10/22/2018, 01/17/2019, 04/09/2019, 06/05/2019     Historical DTP/aP 08/26/1965     Influenza (High Dose) Trivalent,PF (Fluzone) 10/21/2019, 09/25/2024     Influenza (IIV3) PF 10/19/2011     Influenza Vaccine 18-64 (Flublok) 02/01/2022, 10/06/2022     Influenza Vaccine >6 months,quad, PF 11/07/2023     Influenza Vaccine, 6+MO IM (QUADRIVALENT W/PRESERVATIVES) 09/21/2018     Pneumococcal 20 valent Conjugate (Prevnar 20) 08/16/2022     Pneumococcal 23 valent 02/01/2005, 10/19/2011, 08/27/2020     RSV (Abrysvo) 12/12/2023     RSV Vaccine (Arexvy) 11/01/2023, 12/12/2023     TD,PF 7+ (Tenivac) 10/02/1997     TDAP (Adacel,Boostrix) 09/21/2018     TDAP Vaccine (Adacel) 06/23/2008     TDAP Vaccine (Boostrix) 06/23/2008     Zoster recombinant adjuvanted (Shingrix) 08/27/2020, 01/21/2021     Zoster vaccine, live 06/12/2016         Physical Exam   No vital signs taken, as this was a virtual visit   Gen: Alert, oriented. Talking into the camera and sitting in her chair in her living room.  HEENT: NC/AT. No scleral icterus noted. Neck supple and moving in all directions.   CV: Appears well-perfused. No cyanosis noted per video visit.   Resp: Breathing comfortably on room air. No increased work of breathing noted.   Skin: No rashes/lesions noted on face or arms in the camera    Data     Data  Laboratory data and imaging listed below was reviewed prior to this encounter.     Microbiology:    Culture   Date Value Ref Range Status   07/13/2024 No anaerobic organisms isolated  Final   07/13/2024 No Growth   Final   07/13/2024 (A)  Final    1+ Streptococcus agalactiae (Group B Streptococcus)     Comment:     Susceptibilities done on previous cultures   07/13/2024 No anaerobic organisms isolated  Final   07/13/2024 No Growth  Final   07/13/2024 (A)  Final    1+ Streptococcus agalactiae (Group B Streptococcus)     Comment:     Susceptibilities done on previous cultures   07/13/2024 No anaerobic organisms isolated  Final   07/13/2024 No Growth  Final   07/13/2024 (A)  Final    1+ Streptococcus agalactiae (Group B Streptococcus)     Comment:     Susceptibilities done on previous cultures   07/12/2024 No Growth  Final   07/12/2024 No Growth  Final   07/12/2024 (A)  Final    2+ Streptococcus agalactiae (Group B Streptococcus)   07/12/2024 No anaerobic organisms isolated  Final   11/10/2021 4+ Anaerococcus species (A)  Final     Comment:     Susceptibilities done on previous cultures   11/10/2021 2+ Peptoniphilus asaccharolyticus (A)  Final   11/10/2021 No Growth  Final   11/10/2021 No Growth  Final   11/10/2021 4+ Citrobacter koseri (A)  Final     Comment:     Susceptibilities done on previous cultures   11/10/2021 4+ Enterococcus faecalis (A)  Final     Comment:     Susceptibilities done on previous cultures   11/10/2021 4+ Streptococcus oralis (A)  Final     Comment:     Susceptibilities done on previous cultures   11/10/2021 4+ Citrobacter koseri (A)  Final     Comment:     Susceptibilities done on previous cultures   11/10/2021 Isolated in broth only Enterococcus faecalis (A)  Final     Comment:     On day 1 of incubation  Susceptibilities done on previous cultures   11/10/2021 4+ Anaerococcus species (A)  Final   11/10/2021 No Growth  Final   11/10/2021 4+ Citrobacter koseri (A)  Final   11/10/2021 4+ Enterococcus faecalis (A)  Final   11/10/2021 4+ Staphylococcus epidermidis (A)  Final   11/10/2021 3+ Streptococcus oralis (A)  Final   10/13/2020 No Growth  Final   08/05/2019 No Growth  Final   08/02/2019   Final    No  Salmonella, Shigella, Yersinia, or Campylobacter.   07/31/2019 No Growth  Final   04/14/2019 Mixture of urogenital organisms  Final   05/04/2018 CITROBACTER KOSERI (A)  Final     Comment:     >100,000 col/ml Citrobacter koseri     GS Culture   Date Value Ref Range Status   07/13/2024 See corresponding culture for results  Final   07/13/2024 See corresponding culture for results  Final   07/13/2024 See corresponding culture for results  Final   , Inflammatory Markers:   Recent Labs   Lab Test 10/23/24  1023 07/12/24  1003 12/10/20  0844 12/09/20  0637 12/08/20  0511 12/07/20  0559 12/06/20  0504 10/30/20  0649 10/28/20  1603 06/18/18  1024   SED 32* 83*  --   --   --   --   --   --  100* 17   CRP  --   --  51.6* 87.0* 165.0* 210.0* 119.0* 54.7* 108.0* 8.9*   , Hematology Studies:    Recent Labs   Lab Test 09/06/24  1055 07/15/24  0648 07/14/24  0645 07/13/24  0620 07/12/24  1003 05/29/24  1030 02/20/24  1028 09/28/21  0534 09/26/21  0607 04/06/21  1018 03/29/21  1040 03/27/21  0934   WBC 6.5 8.0  --  9.7 14.7* 5.2 4.5   < > 10.2   < > 18.4* 4.7   ANEU 4.2  --   --  7.7 12.4*  --   --   --  7.9  --  16.0* 3.1   AEOS 0.4  --   --  0.1 0.0  --   --   --  0.1  --  0.0 0.2   HGB 12.5 11.1* 11.6* 11.1* 12.1 13.3 13.2   < > 10.6*   < > 12.3 12.2   MCV 99 102*  --  102* 99 100 97   < > 99   < > 98 97    129*  --  89* 99* 153 148*   < > 102*   < > 155 127*    < > = values in this interval not displayed.    , CD4: No lab results found. and HIV RNA: No lab results found., Hepatitis B Testing:   Recent Labs   Lab Test 08/18/19  0123 06/18/18  1024 08/09/17  0819   HBCAB Nonreactive Nonreactive  --    HEPBANG  --  Nonreactive Negative   , Hepatitis C Testing:   Hepatitis C Antibody   Date Value Ref Range Status   08/18/2019 Nonreactive NR^Nonreactive Final     Comment:     Assay performance characteristics have not been established for newborns,   infants, and children     04/18/2019 Nonreactive NR^Nonreactive Final      "Comment:     Assay performance characteristics have not been established for newborns,   infants, and children     , TB Quant: No lab results found.    Invalid input(s): \"[id number]\", \"[id number]\", Immune Globulin Studies:   Recent Labs   Lab Test 06/18/18  1024   IGG 1,960*      *   , HSV 1/2 IgG: No lab results found., HVS 1/2 PCR:   Recent Labs   Lab Test 09/05/19  1545   HSCSF1 Not Detected   HSCSF2 Not Detected   , VZV PCR:   Recent Labs   Lab Test 09/05/19  1549   VZRES VZV DNA Not Detected, presumed negative for Varicella zoster virus.   , and VZV IgG: No lab results found., Quantitative CMV PCR:   Recent Labs   Lab Test 10/05/19  0539   CMVQNT CMV DNA Not Detected   CMVLOG Not Calculated    and CMV IgG:   Recent Labs   Lab Test 08/17/19  2341 06/18/18  1024   CMVIGG >8.0* >8.0*   , and Quantitative EBV PCR:   Recent Labs   Lab Test 10/05/19  0539   EBRES EBV DNA Not Detected   EBLOG Not Calculated    and EBV EA IgG: No lab results found.                  Again, thank you for allowing me to participate in the care of your patient.      Sincerely,    Andressa Harper MD    "

## 2025-06-17 NOTE — PATIENT INSTRUCTIONS
It was great to talk with you today. We discussed the following:     We will continue to have you on cefadroxil twice daily indefinitely. Since you aren't experiencing any side effects, this is a very reasonable plan. We will readdress every 6 months about whether this remains a reasonable plan.   You can get your Spring COVID-19 booster whenever you are able to. Since there is a new COVID-19 variant going around, it is a good idea to do this as soon as possible.   You can also start the Twinrix (hepatitis A and B combined vaccine) at your local pharmacy. There are 3 shots in this series at 0, 1, and 6 months  I have ordered labs to check your measles immunity. We will fax this to you.   Continue getting labs every 2 months while on antibiotics.     Follow-up with me in 6 months or sooner if needed.

## 2025-06-17 NOTE — NURSING NOTE
Current patient location: [address]  [place] [address]    Is the patient currently in the state of [place]? YES    Visit mode: VIDEO    If the visit is dropped, the patient can be reconnected by:VIDEO VISIT: Text to cell phone:   Telephone Information:   Mobile [phone]       Will anyone else be joining the visit? NO  (If patient encounters technical issues they should call [phone]    Are changes needed to the allergy or medication list? No    Are refills needed on medications prescribed by this physician? Discuss with provider    Rooming Documentation:  Questionnaire(s) not done per department protocol    Reason for visit: BLAYNE QUILES

## 2025-06-23 ENCOUNTER — TELEPHONE (OUTPATIENT)
Dept: TRANSPLANT | Facility: CLINIC | Age: 65
End: 2025-06-23
Payer: COMMERCIAL

## 2025-06-23 DIAGNOSIS — T84.50XD INFECTION OF PROSTHETIC JOINT, SUBSEQUENT ENCOUNTER: Primary | ICD-10-CM

## 2025-06-23 NOTE — TELEPHONE ENCOUNTER
Spoke with Nicci regarding vaccine questions.    Once patient does covid will require weekly labs after.    Patient ok to have hep b/hep a vaccine.     Pt aware measles is live vaccine and will keep SOT office updated with titer.       Pt will review lung cancer screening guidelines at her PCP appt.

## 2025-06-25 DIAGNOSIS — M25.561 RIGHT KNEE PAIN: ICD-10-CM

## 2025-06-25 DIAGNOSIS — T84.50XD INFECTION OF PROSTHETIC JOINT, SUBSEQUENT ENCOUNTER: Primary | ICD-10-CM

## 2025-06-25 DIAGNOSIS — Z98.890 POST-OPERATIVE STATE: ICD-10-CM

## 2025-06-27 ENCOUNTER — RESULTS FOLLOW-UP (OUTPATIENT)
Dept: TRANSPLANT | Facility: CLINIC | Age: 65
End: 2025-06-27

## 2025-06-27 DIAGNOSIS — T84.50XD INFECTION OF PROSTHETIC JOINT, SUBSEQUENT ENCOUNTER: Primary | ICD-10-CM

## 2025-07-07 ASSESSMENT — KOOS JR
RISING FROM SITTING: MODERATE
GOING UP OR DOWN STAIRS: MODERATE
TWISING OR PIVOTING ON KNEE: MODERATE
HOW SEVERE IS YOUR KNEE STIFFNESS AFTER FIRST WAKING IN MORNING: MODERATE
KOOS JR SCORING: 59.38
BENDING TO THE FLOOR TO PICK UP OBJECT: MILD
STANDING UPRIGHT: MILD
STRAIGHTENING KNEE FULLY: MILD

## 2025-07-11 ENCOUNTER — VIRTUAL VISIT (OUTPATIENT)
Dept: ORTHOPEDICS | Facility: CLINIC | Age: 65
End: 2025-07-11
Payer: COMMERCIAL

## 2025-07-11 DIAGNOSIS — T84.50XD INFECTION OF PROSTHETIC JOINT, SUBSEQUENT ENCOUNTER: Primary | ICD-10-CM

## 2025-07-11 PROCEDURE — 98006 SYNCH AUDIO-VIDEO EST MOD 30: CPT | Performed by: ORTHOPAEDIC SURGERY

## 2025-07-11 NOTE — LETTER
7/11/2025      Nicci Pemberton  [address] [address]      Dear Colleague,    Thank you for referring your patient, Nicci Pemberton, to the [facility] [place]. Please see a copy of my visit note below.    Orthopaedic Surgery Clinic Follow-up Appointment    DOS:  10/23/2020, R TKARodrigo.  DOS:  09/24/2021, L TKA, Rodrigo.  DOS:  11/10/2021, I&D superficial wound infection, L TKARodrigo.  DOS:  07/13/2024, DAIR of infected R TKASofia.  Cultures:  Strep agalactiae (GBS)    Remote Encounter Information:  Type of remote encounter:   Video  Patient location:    Patient's Home in [place]  Provider location:  [facility]  Platform:   Bioaxial  Duration of encounter: 10 minutes    History:  I had the opportunity to see this pleasant 65 year old patient today in follow-up for the above, by video encounter.  She report that she relatively is doing well and reports satisfaction with her outcome; although there is some ongoing pain, it does appear to be stable and not increased without any symptoms for recurrence of infection, and she reports it is not unbearable.  She does endorse the ability to continue to ambulate and do her ADLs.  She does report particular difficulty with stairs however.  She denies any fevers, chills, or any incisional problems such as redness or drainage.  She denies any interval trauma.  She did have recent labs and x-rays performed close to her home.  She and her infectious disease specialist Dr. Harper have opted via shared decision making to continue with continuing l suppressive bridgett-term antibiotic therapy.  Originally, a tentative course of 12 months of suppressive antibiotic therapy with oral cefadroxil 1 g p.o. twice daily was t have oended 7/12/2025, and it sounds like this will be continued indefinitely for the time being.  When weighing the risks and benefits, the patient expressed that continuing antibiotic suppression  indefinitely had seemed appropriate to her.  The patient denies any adverse reactions from the antibiotics.  She does have a past medical history notable for among them, having had a liver transplant.  History is obtained from interview with the patient at this encounter.    Exam:  Examination today shows the patient to be a pleasant, cooperative, awake, and alert adult in no acute distress.  She converses appropriately.  Mood and affect appear to be normal.  Hearing appears to be intact to spoken word.    Imaging:  Independent review of imaging was performed.  Right knee radiographs from [facility] dated 6/25/2025 were reviewed.  The report indicated a well-seated appearing right total knee arthroplasty with good alignment no fracture, and no significant effusion.  I reviewed the images and compared them with previous images from 1/21/2025.  The components appear to be stable without loosening.  I see no obvious evidence for osteolysis.  Results were discussed with the patient.    Labs:  Outside labs from 6/25/2025 from [facility] in [place] are reviewed.  ESR was 9.  CRP was 0.5.  Serum WBC was 5.  Results were discussed with the patient.    Impression:  Very pleasant 65 year old patient with history of infected right total knee arthroplasty, status post debridement and implant retention, approximately 1 year postoperative, without overt signs for recurrence of infection.    Plan:  Findings and treatment plan were discussed with the patient in layman's terms.  She may continue to weight-bear as tolerated on the right lower extremity.  Continue exercises with a home program as previously instructed by physical therapy.  Option for a new round of physical therapy referral was discussed and offered, and the patient would like to hold off for the time being as she reports that she does know her exercises and knows what to do.  Follow-up with me via virtual encounter in approximately 6 months  with pre-clinic acute phase reactants consisting of a CRP, ESR, and CBC with differential, as well as weightbearing right knee radiographs, obtained locally close to her.  The patient verbalized satisfaction with this type of approach.  Signs and symptoms to watch out for that should prompt immediate medical attention were discussed.  Although it is certainly very encouraging that at 1 year post DAIR the knee looks to be free from overt signs for infection, the potential for recurrence of infection in the future at any time was discussed.  All questions this very pleasant patient had at this time were answered, and she verbalized unde rstanding of and agreement with treatment plan.    Disclaimer:  This note consists of symbols derived from keyboarding, dictation, and/or voice recognition software.  As a result, although I do diligently check for accuracy, there may be errors in the script that have gone undetected.  Please consider this when interpreting information found in this chart.    Again, thank you for allowing me to participate in the care of your patient.        Sincerely,        Mina Black MD    Electronically signed

## 2025-07-11 NOTE — NURSING NOTE
Reason For Visit:   Chief Complaint   Patient presents with    RECHECK     1 year follow-up of rrigation and debridement right total knee arthroplasty - Right   Right tibial insert exchange - Right   DOS: 7/13/2024  Pain in her right knee, not unbearable but she is still having difficulty with stairs.       There were no vitals taken for this visit.         Christiana Ferrer, ATC

## 2025-07-11 NOTE — PROGRESS NOTES
[facility] Follow-up Appointment    DOS:  10/23/2020, R TKARodrigo.  DOS:  09/24/2021, L TKARodrigo.  DOS:  11/10/2021, I&D superficial wound infection, L TKARodrigo.  DOS:  07/13/2024, DAIR of infected R TKASofia.  Cultures:  Strep agalactiae (GBS)    Remote Encounter Information:  Type of remote encounter:   Video  Patient location:    Patient's Home in [facility]  Provider location:  [facility]  Platform:   [place]  Duration of encounter: 10 minutes    History:  I had the opportunity to see this pleasant 65 year old patient today in follow-up for the above, by video encounter.  She report that she relatively is doing well and reports satisfaction with her outcome; although there is some ongoing pain, it does appear to be stable and not increased without any symptoms for recurrence of infection, and she reports it is not unbearable.  She does endorse the ability to continue to ambulate and do her ADLs.  She does report particular difficulty with stairs however.  She denies any fevers, chills, or any incisional problems such as redness or drainage.  She denies any interval trauma.  She did have recent labs and x-rays performed close to her home.  She and her infectious disease specialist Dr. Harper have opted via shared decision making to continue with continuing l suppressive bridgett-term antibiotic therapy.  Originally, a tentative course of 12 months of suppressive antibiotic therapy with oral cefadroxil 1 g p.o. twice daily was t have oended 7/12/2025, and it sounds like this will be continued indefinitely for the time being.  When weighing the risks and benefits, the patient expressed that continuing antibiotic suppression indefinitely had seemed appropriate to her.  The patient denies any adverse reactions from the antibiotics.  She does have a past medical history notable for among them, having had a liver transplant.  History is obtained from interview with the patient  at this encounter.    Exam:  Examination today shows the patient to be a pleasant, cooperative, awake, and alert adult in no acute distress.  She converses appropriately.  Mood and affect appear to be normal.  Hearing appears to be intact to spoken word.    Imaging:  Independent review of imaging was performed.  Right knee radiographs from [facility] dated 6/25/2025 were reviewed.  The report indicated a well-seated appearing right total knee arthroplasty with good alignment no fracture, and no significant effusion.  I reviewed the images and compared them with previous images from 1/21/2025.  The components appear to be stable without loosening.  I see no obvious evidence for osteolysis.  Results were discussed with the patient.    Labs:  Outside labs from 6/25/2025 from [facility] in [place] are reviewed.  ESR was 9.  CRP was 0.5.  Serum WBC was 5.  Results were discussed with the patient.    Impression:  Very pleasant 65 year old patient with history of infected right total knee arthroplasty, status post debridement and implant retention, approximately 1 year postoperative, without overt signs for recurrence of infection.    Plan:  Findings and treatment plan were discussed with the patient in layman's terms.  She may continue to weight-bear as tolerated on the right lower extremity.  Continue exercises with a home program as previously instructed by physical therapy.  Option for a new round of physical therapy referral was discussed and offered, and the patient would like to hold off for the time being as she reports that she does know her exercises and knows what to do.  Follow-up with me via virtual encounter in approximately 6 months with pre-clinic acute phase reactants consisting of a CRP, ESR, and CBC with differential, as well as weightbearing right knee radiographs, obtained locally close to her.  The patient verbalized satisfaction with this type of approach.  Signs and symptoms  to watch out for that should prompt immediate medical attention were discussed.  Although it is certainly very encouraging that at 1 year post DAIR the knee looks to be free from overt signs for infection, the potential for recurrence of infection in the future at any time was discussed.  All questions this very pleasant patient had at this time were answered, and she verbalized unde rstanding of and agreement with treatment plan.    Disclaimer:  This note consists of symbols derived from keyboarding, dictation, and/or voice recognition software.  As a result, although I do diligently check for accuracy, there may be errors in the script that have gone undetected.  Please consider this when interpreting information found in this chart.

## 2025-07-19 ENCOUNTER — HEALTH MAINTENANCE LETTER (OUTPATIENT)
Age: 65
End: 2025-07-19

## (undated) DEVICE — SU ETHIBOND 1 CTX CR 8/18" CX30D

## (undated) DEVICE — GLOVE PROTEXIS W/NEU-THERA 7.5  2D73TE75

## (undated) DEVICE — SOL NACL 0.9% IRRIG 1000ML BOTTLE 2F7124

## (undated) DEVICE — SU ETHILON 2-0 FS 18" 664H

## (undated) DEVICE — SUCTION MANIFOLD DORNOCH ULTRA CART UL-CL500

## (undated) DEVICE — SU PROLENE 2-0 FS 18" 8685H

## (undated) DEVICE — ENDO EXTRACTOR BALLOON 09-12MM 4690

## (undated) DEVICE — WIRE GUIDE 0.025"X270CM STR VISIGLIDE G-240-2527S

## (undated) DEVICE — DRAIN JACKSON PRATT ROUND SIL 19FR W/TROCAR LF JP-2232

## (undated) DEVICE — NDL COUNTER 20CT 31142493

## (undated) DEVICE — CAST PADDING 4" UNSTERILE 9044

## (undated) DEVICE — SU PDS II 1 TP-1 54" Z879G

## (undated) DEVICE — PREP DURAPREP 26ML APL 8630

## (undated) DEVICE — CAST PADDING 6" UNSTERILE 9046

## (undated) DEVICE — ESU ELEC BLADE 6" COATED E1450-6

## (undated) DEVICE — ENDO CATH BALLOON DILATION HURRICANE 10MMX4X180CM M00545960

## (undated) DEVICE — CLIP ENDO HEMO-LOC GREEN MED/LG 544230

## (undated) DEVICE — TUBE FEEDING NEOCONNECT POLY ENFIT 8FR 24" PFTM8.0P-NC

## (undated) DEVICE — SU PROLENE 6-0 RB-2DA 30" 8711H

## (undated) DEVICE — SU SILK 1 TIE 6X30" A307H

## (undated) DEVICE — PREP SKIN SCRUB TRAY 4461A

## (undated) DEVICE — KIT CONNECTOR FOR OLYMPUS ENDOSCOPES DEFENDO 100310

## (undated) DEVICE — SOL NACL 0.9% IRRIG 500ML BOTTLE 2F7123

## (undated) DEVICE — CAST PADDING 4" STERILE 9044S

## (undated) DEVICE — GOWN XLG DISP 9545

## (undated) DEVICE — SUTURE BOOTS 051003PBX

## (undated) DEVICE — BLADE SAW SAGITTAL STRK 18X75X0.89MM HD SYS 6 6118-089-075

## (undated) DEVICE — SU VICRYL 0 CT 36" J358H

## (undated) DEVICE — LINEN BACK PACK 5440

## (undated) DEVICE — LINEN GOWN OVERSIZE 5408

## (undated) DEVICE — CATH RETRIEVAL BALLOON EXTRACTOR PRO RX-S INJ ABOVE 9-12MM

## (undated) DEVICE — SU PDS II 6-0 RB-2DA 30" Z149H

## (undated) DEVICE — BLADE SAW SAGITTAL STRK 25X90X1.27MM HD SYS 6 6125-127-090

## (undated) DEVICE — GLOVE PROTEXIS BLUE W/NEU-THERA 7.5  2D73EB75

## (undated) DEVICE — CLIP HORIZON SM RED WIDE SLOT 001201

## (undated) DEVICE — SU PROLENE 4-0 RB-1DA 36" 8557H

## (undated) DEVICE — INFLATION DEVICE BIG 60 ENDO-AN6012

## (undated) DEVICE — SU PROLENE 4-0 SHDA 36" 8521H

## (undated) DEVICE — SURGICEL FIBRILLAR HEMOSTAT 4"X4" 1963

## (undated) DEVICE — CLEANSER JET LAVAGE IRRISEPT 0.05% CHG IRRISEPT45USA

## (undated) DEVICE — ADH SKIN CLOSURE PREMIERPRO EXOFIN 1.0ML 3470

## (undated) DEVICE — PREP CHLORAPREP 26ML TINTED HI-LITE ORANGE 930815

## (undated) DEVICE — DRAPE STOCKINETTE 8" 8586

## (undated) DEVICE — ATTENTION CASE CART PLEASE PICK

## (undated) DEVICE — KIT ENDO FIRST STEP DISINFECTANT 200ML W/POUCH EP-4

## (undated) DEVICE — DRAPE CONVERTORS U-DRAPE 60X72" 8476

## (undated) DEVICE — WIPE PREMOIST CLEANSING WASHCLOTHS 7988

## (undated) DEVICE — DEFIB PRO-PADZ LVP LQD GEL ADULT 8900-2105-01

## (undated) DEVICE — SOL WATER IRRIG 1000ML BOTTLE 2F7114

## (undated) DEVICE — Device

## (undated) DEVICE — LINEN TOWEL PACK X6 WHITE 5487

## (undated) DEVICE — BNDG ELASTIC 6" DBL LENGTH UNSTERILE 6611-16

## (undated) DEVICE — ENDO BITE BLOCK ADULT OMNI-BLOC

## (undated) DEVICE — SOL NACL 0.9% IRRIG 3000ML BAG 2B7477

## (undated) DEVICE — SURGICEL HEMOSTAT 4X8" 1952

## (undated) DEVICE — DRSG STERI STRIP 1/2X4" R1547

## (undated) DEVICE — BONE CEMENT MIXEVAC III HI VAC KIT  0206-015-000

## (undated) DEVICE — DRSG TEGADERM 4X4 3/4" 1626W

## (undated) DEVICE — BASIN SET SINGLE STERILE 13752-624

## (undated) DEVICE — SPHINCTEROTOME 5.5FRX25MM OMNI-TOME FS-OMNI-35 G31911

## (undated) DEVICE — SU PROLENE 7-0 BV-1DA 30" 8703H

## (undated) DEVICE — SU PDS II 2-0 CT-2 27"  Z333H

## (undated) DEVICE — PREP POVIDONE-IODINE 7.5% SCRUB 4OZ BOTTLE MDS093945

## (undated) DEVICE — DRSG TEGADERM ALGINATE AG 4X5" 90303

## (undated) DEVICE — SU VICRYL 2-0 CT-1 27" UND J259H

## (undated) DEVICE — DRSG PRIMAPORE 02X3" 7133

## (undated) DEVICE — SOL NACL 0.9% INJ 250ML BAG 2B1322Q

## (undated) DEVICE — GLOVE PROTEXIS W/NEU-THERA 7.0  2D73TE70

## (undated) DEVICE — GLOVE PROTEXIS BLUE W/NEU-THERA 8.0  2D73EB80

## (undated) DEVICE — ENDO FUSION OMNI-TOME G31903

## (undated) DEVICE — SUCTION IRR SYSTEM W/O TIP INTERPULSE HANDPIECE 0210-100-000

## (undated) DEVICE — ESU PENCIL W/COATED BLADE E2450H

## (undated) DEVICE — ESU GROUND PAD UNIVERSAL W/O CORD

## (undated) DEVICE — STRAP KNEE/BODY 31143004

## (undated) DEVICE — LINEN ORTHO PACK 5446

## (undated) DEVICE — DRAPE SLUSH/WARMER 66X44" ORS-320

## (undated) DEVICE — BASIN SET MAJOR

## (undated) DEVICE — ENDO SNARE POLYPECTOMY OVAL 15MM LOOP SD-240U-15

## (undated) DEVICE — GLIDEWIRE TERUMO .035X180 ANG STIFF GS3508

## (undated) DEVICE — TOURNIQUET CUFF 34" REPRO BROWN 60-7070-106

## (undated) DEVICE — ESU PENCIL W/SMOKE EVAC NEPTUNE STRYKER 0703-046-000

## (undated) DEVICE — SU SILK 0 SH 30" K834H

## (undated) DEVICE — ENDO TUBING CO2 SMARTCAP STERILE DISP 100145CO2EXT

## (undated) DEVICE — SU SILK 0 TIE 6X30" A306H

## (undated) DEVICE — DRSG TEGADERM 4X10" 1627

## (undated) DEVICE — GLOVE BIOGEL PI MICRO INDICATOR UNDERGLOVE SZ 8.0 48980

## (undated) DEVICE — ENDO FUSION OMNI-TOME 21 FS-OMNI-21 G48675

## (undated) DEVICE — DRAPE STOCKINETTE IMPERVIOUS 12" 1587

## (undated) DEVICE — GLOVE PROTEXIS W/NEU-THERA 8.0  2D73TE80

## (undated) DEVICE — CATH TRAY FOLEY SURESTEP 16FR W/TMP PRB STLK LATEX A319416AM

## (undated) DEVICE — TUBE CULTURE AEROBIC/ANAEROBIC W/O SWABS A.C.T.I.1. 12401

## (undated) DEVICE — BIOPSY VALVE BIOSHIELD 00711135

## (undated) DEVICE — LINEN TOWEL PACK X5 5464

## (undated) DEVICE — ESU LIGASURE IMPACT OPEN SEALER/DVDR CVD LG JAW LF4418

## (undated) DEVICE — TUBE NASOGASTRIC FEEDING ENTRIFLEX ENFIT 10FR 43 8884721088E

## (undated) DEVICE — ESU GROUND PAD ADULT W/CORD E7507

## (undated) DEVICE — ENDO CAP AND TUBING STERILE FOR ENDOGATOR  100130

## (undated) DEVICE — PACK ENDOSCOPY GI CUSTOM UMMC

## (undated) DEVICE — LABEL MEDICATION SYSTEM 3303-P

## (undated) DEVICE — SU PDS II 0 CT-1 27" Z340H

## (undated) DEVICE — TAPE MEASURE PAPER 36" LF NI14-1300

## (undated) DEVICE — SU PROLENE 1 CTX 30" 8455H

## (undated) DEVICE — WIPES FOLEY CARE SURESTEP PROVON DFC100

## (undated) DEVICE — DRAPE ISOLATION BAG 1003

## (undated) DEVICE — SU PLEDGET SOFT TFE 13MMX7MMX1.5MM D7044

## (undated) DEVICE — SU ETHILON 3-0 PS-1 18" 1663H

## (undated) DEVICE — CATH RETRIEVAL BALLOON EXTRACTOR PRO RX-S INJ ABOVE 12-15MM

## (undated) DEVICE — CLIP HORIZON LG ORANGE 004200

## (undated) DEVICE — CAST PADDING 6" STERILE 9046S

## (undated) DEVICE — PREP DURAPREP REMOVER 4OZ 8611

## (undated) DEVICE — TUBE FEEDING NEOCONNECT SIL ENFIT 5FR 40CM PFTS5.0S-NC

## (undated) DEVICE — ESU HOLSTER PLASTIC DISP E2400

## (undated) DEVICE — WIRE GUIDE 0.025"X270CM SHORT STR VISIGLIDE 2 G-260-2527S

## (undated) DEVICE — GUIDEWIRE NOVAGOLD .018X260CM STR TIP M00552000

## (undated) DEVICE — SU STRATAFIX PDS PLUS 1 CT-1 18" SXPP1A404

## (undated) DEVICE — SU PROLENE 7-0 BV-1DA 4X30" M8703

## (undated) DEVICE — SU SILK 5-0 TIE 12X30" A302H

## (undated) DEVICE — SU SILK 3-0 TIE 12X30" A304H

## (undated) DEVICE — SU VICRYL 3-0 SH 27" J316H

## (undated) DEVICE — SUCTION MANIFOLD NEPTUNE 2 SYS 4 PORT 0702-020-000

## (undated) DEVICE — GLOVE BIOGEL PI SZ 8.0 40880

## (undated) DEVICE — SYR BULB IRRIG DOVER 60 ML LATEX FREE 67000

## (undated) DEVICE — SU SILK 2-0 SH 30" K833H

## (undated) DEVICE — GLOVE PROTEXIS POWDER FREE 7.5 ORTHOPEDIC 2D73ET75

## (undated) DEVICE — ENDO FORCEP ENDOJAW BIOPSY 2.8MMX230CM FB-220U

## (undated) DEVICE — TOURNIQUET CUFF 30" REPRO BLUE 60-7070-105

## (undated) DEVICE — SU VICRYL 0 CT-1 36" J946H

## (undated) DEVICE — WIRE GUIDE 0.025"X270CM ANG VISIGLIDE G-240-2527A

## (undated) DEVICE — TAPE DURAPORE 3" SILK 1538-3

## (undated) DEVICE — SOL ADH LIQUID BENZOIN SWAB 0.6ML C1544

## (undated) DEVICE — SYR 01ML 27GA 0.5" NDL TBC 309623

## (undated) DEVICE — TUBING SUCTION MEDI-VAC 1/4"X20' N620A

## (undated) DEVICE — DRSG ABDOMINAL 07 1/2X8" 7197D

## (undated) DEVICE — BLADE SAW SAGITTAL STRK 25X90X1.19MM HD SYS 6 6125-119-090

## (undated) DEVICE — CLIP ENDO HEMO-LOC PURPLE LG 544240

## (undated) DEVICE — KIT WOUND VAC VIA SYSTEM STARTER VIAKIT077D01

## (undated) DEVICE — SPECIMEN CONTAINER 3OZ W/FORMALIN 59901

## (undated) DEVICE — BLADE KNIFE SURG 15 371115

## (undated) DEVICE — DRSG TEGADERM 8X12" 1629

## (undated) DEVICE — PACK UNIVERSAL SPLIT 29131

## (undated) DEVICE — LINEN TOWEL PACK X30 5481

## (undated) DEVICE — ENDO CONNECTOR ENDOGATOR AUX WATER JET FOR OLYMPUS SCOPE

## (undated) DEVICE — SPONGE LAP 18X18" X8435

## (undated) DEVICE — DRSG MEDIPORE 3 1/2X13 3/4" 3573

## (undated) DEVICE — STPL ENDO ARTICULATING ENDOPATH X8 45MM G/B/W ATS45NK

## (undated) DEVICE — SU PDS II 3-0 PS-1 18" Z683G

## (undated) DEVICE — SU SILK 3-0 SH-1 CR 8X18" C003D

## (undated) DEVICE — DRAPE IOBAN C-SECTION W/POUCH 30X35" 6657

## (undated) DEVICE — SU PROLENE 5-0 RB-1DA 36"  8556H

## (undated) DEVICE — CONTAINER SPECIMEN 4OZ STERILE 17099

## (undated) DEVICE — ENDO CATH BALLOON DILATION HURRICANE 04MMX4X180CM M00545900

## (undated) DEVICE — SUCTION TIP YANKAUER VIAGUARD W/SLEEVE SMP-2006-001SS

## (undated) DEVICE — TUBING IRRIG CYSTO/BLADDER SET 81" LF 2C4040

## (undated) DEVICE — ESU HANDPIECE ABC BEND-A-BEAM 6" 134006

## (undated) DEVICE — BONE CLEANING TIP INTERPULSE  0210-010-000

## (undated) DEVICE — SU SILK 4-0 TIE 12X30" A303H

## (undated) DEVICE — PREP BRUSH SURG SCRUB CHLOROXYLENOL PCMX 3% 371163

## (undated) DEVICE — DECANTER VIAL 2006S

## (undated) DEVICE — ESU GROUND PAD THERMOGUARD PLUS ABC PEDS 7-382

## (undated) DEVICE — BLADE KNIFE SURG 10 371110

## (undated) DEVICE — DRAPE STERI TOWEL LG 1010

## (undated) DEVICE — TUBING SUCTION 10'X3/16" N510

## (undated) DEVICE — SU SILK 3-0 SH CR 8X18" C013D

## (undated) DEVICE — SU SILK 2-0 TIE 12X30" A305H

## (undated) DEVICE — DRAIN JACKSON PRATT RESERVOIR 400ML SU130-1000

## (undated) DEVICE — CLIP HORIZON MED BLUE 002200

## (undated) DEVICE — SUCTION TIP YANKAUER W/O VENT K86

## (undated) DEVICE — RETR VISCERA FISH MED 3204

## (undated) DEVICE — SOL WATER IRRIG 500ML BOTTLE 2F7113

## (undated) DEVICE — HOOD SURG T7PLUS PEEL AWAY FACE SHIELD STRL LF 0416-801-100

## (undated) DEVICE — SU SILK 3-0 SH 30" K832H

## (undated) DEVICE — SU PROLENE 5-0 RB-2DA 30" 8710H

## (undated) DEVICE — SU PROLENE 3-0 SH-1 30" 8762H

## (undated) DEVICE — SURGICEL ABSORBABLE HEMOSTAT SNOW 4"X4" 2083

## (undated) RX ORDER — PROPOFOL 10 MG/ML
INJECTION, EMULSION INTRAVENOUS
Status: DISPENSED
Start: 2020-02-10

## (undated) RX ORDER — SIMETHICONE 40MG/0.6ML
SUSPENSION, DROPS(FINAL DOSAGE FORM)(ML) ORAL
Status: DISPENSED
Start: 2021-02-24

## (undated) RX ORDER — DOBUTAMINE HYDROCHLORIDE 200 MG/100ML
INJECTION INTRAVENOUS
Status: DISPENSED
Start: 2019-07-11

## (undated) RX ORDER — FENTANYL CITRATE-0.9 % NACL/PF 10 MCG/ML
PLASTIC BAG, INJECTION (ML) INTRAVENOUS
Status: DISPENSED
Start: 2020-10-23

## (undated) RX ORDER — LIDOCAINE HYDROCHLORIDE 20 MG/ML
INJECTION, SOLUTION EPIDURAL; INFILTRATION; INTRACAUDAL; PERINEURAL
Status: DISPENSED
Start: 2020-10-23

## (undated) RX ORDER — CEFAZOLIN SODIUM 2 G/100ML
INJECTION, SOLUTION INTRAVENOUS
Status: DISPENSED
Start: 2021-09-24

## (undated) RX ORDER — FENTANYL CITRATE 50 UG/ML
INJECTION, SOLUTION INTRAMUSCULAR; INTRAVENOUS
Status: DISPENSED
Start: 2021-09-24

## (undated) RX ORDER — HYDROMORPHONE HYDROCHLORIDE 1 MG/ML
INJECTION, SOLUTION INTRAMUSCULAR; INTRAVENOUS; SUBCUTANEOUS
Status: DISPENSED
Start: 2024-07-13

## (undated) RX ORDER — EPHEDRINE SULFATE 50 MG/ML
INJECTION, SOLUTION INTRAMUSCULAR; INTRAVENOUS; SUBCUTANEOUS
Status: DISPENSED
Start: 2020-10-23

## (undated) RX ORDER — LIDOCAINE HYDROCHLORIDE 20 MG/ML
INJECTION, SOLUTION EPIDURAL; INFILTRATION; INTRACAUDAL; PERINEURAL
Status: DISPENSED
Start: 2021-11-10

## (undated) RX ORDER — FENTANYL CITRATE 50 UG/ML
INJECTION, SOLUTION INTRAMUSCULAR; INTRAVENOUS
Status: DISPENSED
Start: 2024-07-13

## (undated) RX ORDER — LIDOCAINE HYDROCHLORIDE 20 MG/ML
SOLUTION OROPHARYNGEAL
Status: DISPENSED
Start: 2019-09-05

## (undated) RX ORDER — LIDOCAINE HYDROCHLORIDE 10 MG/ML
INJECTION, SOLUTION EPIDURAL; INFILTRATION; INTRACAUDAL; PERINEURAL
Status: DISPENSED
Start: 2021-03-25

## (undated) RX ORDER — FENTANYL CITRATE 50 UG/ML
INJECTION, SOLUTION INTRAMUSCULAR; INTRAVENOUS
Status: DISPENSED
Start: 2020-03-04

## (undated) RX ORDER — ONDANSETRON 2 MG/ML
INJECTION INTRAMUSCULAR; INTRAVENOUS
Status: DISPENSED
Start: 2024-07-13

## (undated) RX ORDER — FENTANYL CITRATE 50 UG/ML
INJECTION, SOLUTION INTRAMUSCULAR; INTRAVENOUS
Status: DISPENSED
Start: 2021-11-10

## (undated) RX ORDER — LIDOCAINE HYDROCHLORIDE 20 MG/ML
INJECTION, SOLUTION EPIDURAL; INFILTRATION; INTRACAUDAL; PERINEURAL
Status: DISPENSED
Start: 2020-03-04

## (undated) RX ORDER — LIDOCAINE HYDROCHLORIDE 20 MG/ML
SOLUTION OROPHARYNGEAL
Status: DISPENSED
Start: 2019-08-28

## (undated) RX ORDER — INDOMETHACIN 50 MG/1
SUPPOSITORY RECTAL
Status: DISPENSED
Start: 2020-05-13

## (undated) RX ORDER — ONDANSETRON 2 MG/ML
INJECTION INTRAMUSCULAR; INTRAVENOUS
Status: DISPENSED
Start: 2020-05-13

## (undated) RX ORDER — PROPOFOL 10 MG/ML
INJECTION, EMULSION INTRAVENOUS
Status: DISPENSED
Start: 2021-11-10

## (undated) RX ORDER — FENTANYL CITRATE-0.9 % NACL/PF 10 MCG/ML
PLASTIC BAG, INJECTION (ML) INTRAVENOUS
Status: DISPENSED
Start: 2024-07-13

## (undated) RX ORDER — ONDANSETRON 2 MG/ML
INJECTION INTRAMUSCULAR; INTRAVENOUS
Status: DISPENSED
Start: 2021-11-10

## (undated) RX ORDER — FENTANYL CITRATE 50 UG/ML
INJECTION, SOLUTION INTRAMUSCULAR; INTRAVENOUS
Status: DISPENSED
Start: 2021-02-24

## (undated) RX ORDER — PROPOFOL 10 MG/ML
INJECTION, EMULSION INTRAVENOUS
Status: DISPENSED
Start: 2024-07-13

## (undated) RX ORDER — GABAPENTIN 300 MG/1
CAPSULE ORAL
Status: DISPENSED
Start: 2021-11-10

## (undated) RX ORDER — EPHEDRINE SULFATE 50 MG/ML
INJECTION, SOLUTION INTRAMUSCULAR; INTRAVENOUS; SUBCUTANEOUS
Status: DISPENSED
Start: 2020-03-04

## (undated) RX ORDER — TRANEXAMIC ACID 650 MG/1
TABLET ORAL
Status: DISPENSED
Start: 2020-10-23

## (undated) RX ORDER — ONDANSETRON 2 MG/ML
INJECTION INTRAMUSCULAR; INTRAVENOUS
Status: DISPENSED
Start: 2020-03-04

## (undated) RX ORDER — FENTANYL CITRATE 50 UG/ML
INJECTION, SOLUTION INTRAMUSCULAR; INTRAVENOUS
Status: DISPENSED
Start: 2020-10-23

## (undated) RX ORDER — METOPROLOL TARTRATE 1 MG/ML
INJECTION, SOLUTION INTRAVENOUS
Status: DISPENSED
Start: 2018-06-27

## (undated) RX ORDER — ALBUMIN, HUMAN INJ 5% 5 %
SOLUTION INTRAVENOUS
Status: DISPENSED
Start: 2019-10-14

## (undated) RX ORDER — OXYCODONE HYDROCHLORIDE 5 MG/1
TABLET ORAL
Status: DISPENSED
Start: 2021-09-24

## (undated) RX ORDER — LIDOCAINE HYDROCHLORIDE 20 MG/ML
INJECTION, SOLUTION EPIDURAL; INFILTRATION; INTRACAUDAL; PERINEURAL
Status: DISPENSED
Start: 2021-05-12

## (undated) RX ORDER — IOPAMIDOL 510 MG/ML
INJECTION, SOLUTION INTRAVASCULAR
Status: DISPENSED
Start: 2021-02-24

## (undated) RX ORDER — PAPAVERINE HYDROCHLORIDE 30 MG/ML
INJECTION INTRAMUSCULAR; INTRAVENOUS
Status: DISPENSED
Start: 2019-08-18

## (undated) RX ORDER — ACETAMINOPHEN 325 MG/1
TABLET ORAL
Status: DISPENSED
Start: 2021-11-10

## (undated) RX ORDER — DEXAMETHASONE SODIUM PHOSPHATE 4 MG/ML
INJECTION, SOLUTION INTRA-ARTICULAR; INTRALESIONAL; INTRAMUSCULAR; INTRAVENOUS; SOFT TISSUE
Status: DISPENSED
Start: 2021-02-24

## (undated) RX ORDER — ACETAMINOPHEN 325 MG/1
TABLET ORAL
Status: DISPENSED
Start: 2021-09-24

## (undated) RX ORDER — LIDOCAINE HYDROCHLORIDE AND EPINEPHRINE 10; 10 MG/ML; UG/ML
INJECTION, SOLUTION INFILTRATION; PERINEURAL
Status: DISPENSED
Start: 2024-03-20

## (undated) RX ORDER — LIDOCAINE HYDROCHLORIDE 20 MG/ML
INJECTION, SOLUTION EPIDURAL; INFILTRATION; INTRACAUDAL; PERINEURAL
Status: DISPENSED
Start: 2021-02-24

## (undated) RX ORDER — CEFAZOLIN SODIUM 2 G/100ML
INJECTION, SOLUTION INTRAVENOUS
Status: DISPENSED
Start: 2020-10-23

## (undated) RX ORDER — LIDOCAINE HYDROCHLORIDE 20 MG/ML
INJECTION, SOLUTION EPIDURAL; INFILTRATION; INTRACAUDAL; PERINEURAL
Status: DISPENSED
Start: 2020-05-13

## (undated) RX ORDER — ONDANSETRON 2 MG/ML
INJECTION INTRAMUSCULAR; INTRAVENOUS
Status: DISPENSED
Start: 2021-02-24

## (undated) RX ORDER — INDOMETHACIN 50 MG/1
SUPPOSITORY RECTAL
Status: DISPENSED
Start: 2021-02-24

## (undated) RX ORDER — HYDROMORPHONE HYDROCHLORIDE 1 MG/ML
INJECTION, SOLUTION INTRAMUSCULAR; INTRAVENOUS; SUBCUTANEOUS
Status: DISPENSED
Start: 2021-09-24

## (undated) RX ORDER — ESMOLOL HYDROCHLORIDE 10 MG/ML
INJECTION INTRAVENOUS
Status: DISPENSED
Start: 2020-05-13

## (undated) RX ORDER — FENTANYL CITRATE 50 UG/ML
INJECTION, SOLUTION INTRAMUSCULAR; INTRAVENOUS
Status: DISPENSED
Start: 2019-08-18

## (undated) RX ORDER — METOPROLOL TARTRATE 1 MG/ML
INJECTION, SOLUTION INTRAVENOUS
Status: DISPENSED
Start: 2019-07-11

## (undated) RX ORDER — FENTANYL CITRATE 50 UG/ML
INJECTION, SOLUTION INTRAMUSCULAR; INTRAVENOUS
Status: DISPENSED
Start: 2020-05-13

## (undated) RX ORDER — SODIUM CHLORIDE, SODIUM LACTATE, POTASSIUM CHLORIDE, CALCIUM CHLORIDE 600; 310; 30; 20 MG/100ML; MG/100ML; MG/100ML; MG/100ML
INJECTION, SOLUTION INTRAVENOUS
Status: DISPENSED
Start: 2021-09-24

## (undated) RX ORDER — FENTANYL CITRATE 50 UG/ML
INJECTION, SOLUTION INTRAMUSCULAR; INTRAVENOUS
Status: DISPENSED
Start: 2019-09-05

## (undated) RX ORDER — HYDROMORPHONE HYDROCHLORIDE 1 MG/ML
INJECTION, SOLUTION INTRAMUSCULAR; INTRAVENOUS; SUBCUTANEOUS
Status: DISPENSED
Start: 2021-02-24

## (undated) RX ORDER — PROPOFOL 10 MG/ML
INJECTION, EMULSION INTRAVENOUS
Status: DISPENSED
Start: 2020-03-04

## (undated) RX ORDER — PROPOFOL 10 MG/ML
INJECTION, EMULSION INTRAVENOUS
Status: DISPENSED
Start: 2019-08-18

## (undated) RX ORDER — OXYCODONE HYDROCHLORIDE 5 MG/1
TABLET ORAL
Status: DISPENSED
Start: 2020-10-23

## (undated) RX ORDER — ALBUMIN, HUMAN INJ 5% 5 %
SOLUTION INTRAVENOUS
Status: DISPENSED
Start: 2019-08-18

## (undated) RX ORDER — ACETAMINOPHEN 325 MG/1
TABLET ORAL
Status: DISPENSED
Start: 2020-10-23

## (undated) RX ORDER — HYDROMORPHONE HYDROCHLORIDE 1 MG/ML
INJECTION, SOLUTION INTRAMUSCULAR; INTRAVENOUS; SUBCUTANEOUS
Status: DISPENSED
Start: 2020-03-04

## (undated) RX ORDER — FENTANYL CITRATE 50 UG/ML
INJECTION, SOLUTION INTRAMUSCULAR; INTRAVENOUS
Status: DISPENSED
Start: 2018-06-22

## (undated) RX ORDER — FENTANYL CITRATE 50 UG/ML
INJECTION, SOLUTION INTRAMUSCULAR; INTRAVENOUS
Status: DISPENSED
Start: 2021-03-25

## (undated) RX ORDER — LIDOCAINE HYDROCHLORIDE 10 MG/ML
INJECTION, SOLUTION EPIDURAL; INFILTRATION; INTRACAUDAL; PERINEURAL
Status: DISPENSED
Start: 2018-06-22

## (undated) RX ORDER — CEFAZOLIN SODIUM 1 G/3ML
INJECTION, POWDER, FOR SOLUTION INTRAMUSCULAR; INTRAVENOUS
Status: DISPENSED
Start: 2021-11-10

## (undated) RX ORDER — PHENYLEPHRINE HCL IN 0.9% NACL 1 MG/10 ML
SYRINGE (ML) INTRAVENOUS
Status: DISPENSED
Start: 2020-05-13

## (undated) RX ORDER — LIDOCAINE HYDROCHLORIDE 10 MG/ML
INJECTION, SOLUTION EPIDURAL; INFILTRATION; INTRACAUDAL; PERINEURAL
Status: DISPENSED
Start: 2019-09-05

## (undated) RX ORDER — OXYCODONE HYDROCHLORIDE 5 MG/1
TABLET ORAL
Status: DISPENSED
Start: 2024-07-13

## (undated) RX ORDER — ONDANSETRON 2 MG/ML
INJECTION INTRAMUSCULAR; INTRAVENOUS
Status: DISPENSED
Start: 2020-10-23

## (undated) RX ORDER — DEXAMETHASONE SODIUM PHOSPHATE 4 MG/ML
INJECTION, SOLUTION INTRA-ARTICULAR; INTRALESIONAL; INTRAMUSCULAR; INTRAVENOUS; SOFT TISSUE
Status: DISPENSED
Start: 2021-05-12

## (undated) RX ORDER — FENTANYL CITRATE 50 UG/ML
INJECTION, SOLUTION INTRAMUSCULAR; INTRAVENOUS
Status: DISPENSED
Start: 2021-05-12

## (undated) RX ORDER — ACETAMINOPHEN 325 MG/1
TABLET ORAL
Status: DISPENSED
Start: 2024-07-13

## (undated) RX ORDER — LIDOCAINE HYDROCHLORIDE 20 MG/ML
INJECTION, SOLUTION EPIDURAL; INFILTRATION; INTRACAUDAL; PERINEURAL
Status: DISPENSED
Start: 2019-08-18

## (undated) RX ORDER — HYDROMORPHONE HYDROCHLORIDE 1 MG/ML
INJECTION, SOLUTION INTRAMUSCULAR; INTRAVENOUS; SUBCUTANEOUS
Status: DISPENSED
Start: 2021-11-10

## (undated) RX ORDER — IOPAMIDOL 510 MG/ML
INJECTION, SOLUTION INTRAVASCULAR
Status: DISPENSED
Start: 2020-05-13

## (undated) RX ORDER — PROPOFOL 10 MG/ML
INJECTION, EMULSION INTRAVENOUS
Status: DISPENSED
Start: 2020-05-13

## (undated) RX ORDER — ALBUTEROL SULFATE 90 UG/1
AEROSOL, METERED RESPIRATORY (INHALATION)
Status: DISPENSED
Start: 2024-07-13

## (undated) RX ORDER — OXYCODONE AND ACETAMINOPHEN 5; 325 MG/1; MG/1
TABLET ORAL
Status: DISPENSED
Start: 2020-03-04

## (undated) RX ORDER — GENTAMICIN 40 MG/ML
INJECTION, SOLUTION INTRAMUSCULAR; INTRAVENOUS
Status: DISPENSED
Start: 2021-11-10

## (undated) RX ORDER — DEXAMETHASONE SODIUM PHOSPHATE 4 MG/ML
INJECTION, SOLUTION INTRA-ARTICULAR; INTRALESIONAL; INTRAMUSCULAR; INTRAVENOUS; SOFT TISSUE
Status: DISPENSED
Start: 2021-11-10

## (undated) RX ORDER — HEPARIN SODIUM 1000 [USP'U]/ML
INJECTION, SOLUTION INTRAVENOUS; SUBCUTANEOUS
Status: DISPENSED
Start: 2019-08-18

## (undated) RX ORDER — PROPOFOL 10 MG/ML
INJECTION, EMULSION INTRAVENOUS
Status: DISPENSED
Start: 2021-02-24

## (undated) RX ORDER — ONDANSETRON 2 MG/ML
INJECTION INTRAMUSCULAR; INTRAVENOUS
Status: DISPENSED
Start: 2021-09-24

## (undated) RX ORDER — TRANEXAMIC ACID 650 MG/1
TABLET ORAL
Status: DISPENSED
Start: 2021-09-24

## (undated) RX ORDER — FENTANYL CITRATE 50 UG/ML
INJECTION, SOLUTION INTRAMUSCULAR; INTRAVENOUS
Status: DISPENSED
Start: 2019-10-31

## (undated) RX ORDER — LIDOCAINE HYDROCHLORIDE 20 MG/ML
JELLY TOPICAL
Status: DISPENSED
Start: 2019-09-06

## (undated) RX ORDER — GLYCOPYRROLATE 0.2 MG/ML
INJECTION, SOLUTION INTRAMUSCULAR; INTRAVENOUS
Status: DISPENSED
Start: 2021-02-24

## (undated) RX ORDER — DEXAMETHASONE SODIUM PHOSPHATE 4 MG/ML
INJECTION, SOLUTION INTRA-ARTICULAR; INTRALESIONAL; INTRAMUSCULAR; INTRAVENOUS; SOFT TISSUE
Status: DISPENSED
Start: 2020-03-04

## (undated) RX ORDER — FENTANYL CITRATE 50 UG/ML
INJECTION, SOLUTION INTRAMUSCULAR; INTRAVENOUS
Status: DISPENSED
Start: 2020-02-10

## (undated) RX ORDER — SIMETHICONE 40MG/0.6ML
SUSPENSION, DROPS(FINAL DOSAGE FORM)(ML) ORAL
Status: DISPENSED
Start: 2020-05-13

## (undated) RX ORDER — PHENYLEPHRINE HCL IN 0.9% NACL 1 MG/10 ML
SYRINGE (ML) INTRAVENOUS
Status: DISPENSED
Start: 2020-02-10

## (undated) RX ORDER — PROPOFOL 10 MG/ML
INJECTION, EMULSION INTRAVENOUS
Status: DISPENSED
Start: 2020-10-23

## (undated) RX ORDER — PROPOFOL 10 MG/ML
INJECTION, EMULSION INTRAVENOUS
Status: DISPENSED
Start: 2021-05-12

## (undated) RX ORDER — HYDROMORPHONE HYDROCHLORIDE 1 MG/ML
INJECTION, SOLUTION INTRAMUSCULAR; INTRAVENOUS; SUBCUTANEOUS
Status: DISPENSED
Start: 2020-10-23

## (undated) RX ORDER — DIPHENHYDRAMINE HYDROCHLORIDE 50 MG/ML
INJECTION INTRAMUSCULAR; INTRAVENOUS
Status: DISPENSED
Start: 2018-06-22

## (undated) RX ORDER — CEFAZOLIN SODIUM 1 G/3ML
INJECTION, POWDER, FOR SOLUTION INTRAMUSCULAR; INTRAVENOUS
Status: DISPENSED
Start: 2020-10-23

## (undated) RX ORDER — OXYCODONE HYDROCHLORIDE 5 MG/1
TABLET ORAL
Status: DISPENSED
Start: 2021-11-10

## (undated) RX ORDER — ATROPINE SULFATE 0.4 MG/ML
AMPUL (ML) INJECTION
Status: DISPENSED
Start: 2019-07-11